# Patient Record
Sex: MALE | Race: WHITE | NOT HISPANIC OR LATINO | Employment: OTHER | URBAN - METROPOLITAN AREA
[De-identification: names, ages, dates, MRNs, and addresses within clinical notes are randomized per-mention and may not be internally consistent; named-entity substitution may affect disease eponyms.]

---

## 2017-01-01 ENCOUNTER — HOSPITAL ENCOUNTER (EMERGENCY)
Facility: CLINIC | Age: 52
Discharge: HOME | End: 2017-01-01
Attending: PHYSICIAN ASSISTANT | Admitting: PHYSICIAN ASSISTANT
Payer: COMMERCIAL

## 2017-01-01 VITALS
SYSTOLIC BLOOD PRESSURE: 123 MMHG | DIASTOLIC BLOOD PRESSURE: 85 MMHG | OXYGEN SATURATION: 98 % | TEMPERATURE: 97 F | RESPIRATION RATE: 20 BRPM

## 2017-01-01 DIAGNOSIS — M06.9 RHEUMATOID ARTHRITIS INVOLVING MULTIPLE SITES, UNSPECIFIED RHEUMATOID FACTOR PRESENCE: ICD-10-CM

## 2017-01-01 PROCEDURE — 99285 EMERGENCY DEPT VISIT HI MDM: CPT | Mod: 25

## 2017-01-01 PROCEDURE — 25000132 ZZH RX MED GY IP 250 OP 250 PS 637: Performed by: PHYSICIAN ASSISTANT

## 2017-01-01 PROCEDURE — 96376 TX/PRO/DX INJ SAME DRUG ADON: CPT

## 2017-01-01 PROCEDURE — 25000125 ZZHC RX 250: Performed by: PHYSICIAN ASSISTANT

## 2017-01-01 PROCEDURE — 99283 EMERGENCY DEPT VISIT LOW MDM: CPT | Performed by: PHYSICIAN ASSISTANT

## 2017-01-01 PROCEDURE — 96374 THER/PROPH/DIAG INJ IV PUSH: CPT

## 2017-01-01 RX ORDER — LIDOCAINE 40 MG/G
CREAM TOPICAL
Status: DISCONTINUED | OUTPATIENT
Start: 2017-01-01 | End: 2017-01-01 | Stop reason: HOSPADM

## 2017-01-01 RX ORDER — OXYCODONE AND ACETAMINOPHEN 10; 325 MG/1; MG/1
1 TABLET ORAL ONCE
Status: COMPLETED | OUTPATIENT
Start: 2017-01-01 | End: 2017-01-01

## 2017-01-01 RX ADMIN — HYDROMORPHONE HYDROCHLORIDE 1 MG: 1 INJECTION, SOLUTION INTRAMUSCULAR; INTRAVENOUS; SUBCUTANEOUS at 13:05

## 2017-01-01 RX ADMIN — HYDROMORPHONE HYDROCHLORIDE 1 MG: 1 INJECTION, SOLUTION INTRAMUSCULAR; INTRAVENOUS; SUBCUTANEOUS at 13:45

## 2017-01-01 RX ADMIN — OXYCODONE AND ACETAMINOPHEN 1 TABLET: 10; 325 TABLET ORAL at 14:36

## 2017-01-01 NOTE — ED NOTES
Started having a flair up of his rheumatoid arthritis about 4 days ago, states took a humera shot yesterday, with no relief. Has an appointment with a pain clinic on Tuesday but here today to get something to make it til his appt.

## 2017-01-01 NOTE — ED AVS SNAPSHOT
[facility] Emergency Department    [address] [place] [address]    Phone:  [phone]    Fax:  [phone]                                       Jailene Breen   MRN: [id number]    Department:  [facility] Emergency Department   Date of Visit:  1/1/2017           Patient Information     Date Of Birth          1965        Your diagnoses for this visit were:     Rheumatoid arthritis involving multiple sites, unspecified rheumatoid factor presence (H)        You were seen by Armando Byrnes PA-C.        Discharge Instructions       1.  Please see your Pain Clinic on Tuesday to discuss your options for pain management.    24 Hour Appointment Hotline       To make an appointment at any [facility] clinic, call [phone] ([phone]). If you don't have a family doctor or clinic, we will help you find one. [facility] clinics are conveniently located to serve the needs of you and your family.             Review of your medicines      Our records show that you are taking the medicines listed below. If these are incorrect, please call your family doctor or clinic.        Dose / Directions Last dose taken    busPIRone 15 MG tablet   Commonly known as:  BUSPAR        Refills:  0        clonazePAM 1 MG tablet   Commonly known as:  klonoPIN   Dose:  1 mg   Quantity:  20 tablet        Take 1 tablet (1 mg) by mouth daily as needed for anxiety   Refills:  0        FLUoxetine 40 MG capsule   Commonly known as:  PROzac   Dose:  40 mg   Quantity:  90 capsule        Take 1 capsule (40 mg) by mouth daily   Refills:  1        folic acid 1 MG tablet   Commonly known as:  FOLVITE        Refills:  0        HUMIRA 40 MG/0.8ML prefilled syringe kit   Dose:  40 mg   Generic drug:  adalimumab        Inject 40 mg Subcutaneous   Refills:  0        IBUPROFEN PO   Dose:  800 mg        Take 800 mg by mouth every 6 hours as needed for moderate pain   Refills:  0        methotrexate 2.5 MG tablet CHEMO    Dose:  6 tablet        6 tablets 6- tablets every Sunday   Refills:  0        methylPREDNISolone 4 MG tablet   Commonly known as:  MEDROL DOSEPAK   Quantity:  21 tablet        Follow package instructions   Refills:  0        oxyCODONE-acetaminophen  MG per tablet   Commonly known as:  PERCOCET   Dose:  1 tablet   Quantity:  20 tablet        Take 1 tablet by mouth every 6 hours as needed   Refills:  0                Procedures and tests performed during your visit     Peripheral IV catheter      Orders Needing Specimen Collection     None      Pending Results     No orders found from 12/31/2016 to 1/2/2017.            Thank you for choosing [facility]       Thank you for choosing [facility] for your care. Our goal is always to provide you with excellent care. Hearing back from our patients is one way we can continue to improve our services. Please take a few minutes to complete the written survey that you may receive in the mail after you visit with us. Thank you!        [employer] Information     [employer] gives you secure access to your electronic health record. If you see a primary care provider, you can also send messages to your care team and make appointments. If you have questions, please call your primary care clinic.  If you do not have a primary care provider, please call [phone] and they will assist you.        After Visit Summary       This is your record. Keep this with you and show to your community pharmacist(s) and doctor(s) at your next visit.

## 2017-01-01 NOTE — ED AVS SNAPSHOT
[facility] Emergency Department    [address] [place] [address]    Phone:  [phone]    Fax:  [phone]                                       Jailene Breen   MRN: [id number]    Department:  [facility] Emergency Department   Date of Visit:  1/1/2017           After Visit Summary Signature Page     I have received my discharge instructions, and my questions have been answered. I have discussed any challenges I see with this plan with the nurse or doctor.    ..........................................................................................................................................  Patient/Patient Representative Signature      ..........................................................................................................................................  Patient Representative Print Name and Relationship to Patient    ..................................................               ................................................  Date                                            Time    ..........................................................................................................................................  Reviewed by Signature/Title    ...................................................              ..............................................  Date                                                            Time

## 2017-01-01 NOTE — ED PROVIDER NOTES
"  History     Chief Complaint   Patient presents with     Joint Pain     HPI  Jailene Breen is a 51 year old male who presents for evaluation of a pain exacerbation related to his rheumatoid arthritis. He states that he was diagnosed with rheumatoid arthritis generally of 2016. He has been on methotrexate until 4 days ago when he received his first injection of Humira. They started an alternative medication regimen due to his lack of improvement with methotrexate therapy. Patient also has ongoing low back pain for which she is seeing the [facility] pain clinic for. He is scheduled for a rhizotomy procedure on 1/10/17.  He was told that he cannot have steroids or anti-inflammatory medications for 2 weeks prior to his procedure. He does not want to miss out on this procedure given the severity of his back pain recently. He is on a pain contract through the [facility] pain center and takes Percocet 10/325 milligrams tabs every 6 hours as needed for pain. Recently has been taking 2 tabs. He is scheduled to see the pain clinic in 2 days. He states \"I just want to make it to my appointment \". He is unable to sleep or function given his pain flare at this time. He notes significant joint stiffness in his IP joints, wrist, bilateral shoulders, bilateral hips, bilateral knees. Moving causes a lot more pain. He also has increased back pain.  He denies any loss of bowel/bladder function.    I have reviewed the Medications, Allergies, Past Medical and Surgical History, and Social History in the [facility] system.    Past Medical History   Diagnosis Date     Anxiety      Panic attacks      Back pain      possible drug seeking behavior in the past.      Arthritis      back, knees        Patient Active Problem List   Diagnosis     CARDIOVASCULAR SCREENING; LDL GOAL LESS THAN 160     Anxiety     Overweight (BMI 25.0-29.9)     Back pain     Tear meniscus knee, right, initial encounter     Rheumatoid arthritis involving multiple " "sites, unspecified rheumatoid factor presence (H)     Chronic pain     Right-sided thoracic back pain, unspecified chronicity     JEFF (generalized anxiety disorder)     Panic attack        Past Surgical History   Procedure Laterality Date     Orthopedic surgery       Rt knee X 2     Orthopedic surgery       Lt foot     Arthroscopy knee Right 9/22/2016     Procedure: ARTHROSCOPY KNEE;  Surgeon: Fredo Hughes MD;  Location: [facility] OR     Inject paravertebral facet joint lumbar / sacral first Right 11/25/2016     Procedure: INJECT PARAVERTEBRAL FACET JOINT LUMBAR / SACRAL FIRST;  Surgeon: Doretha Magallanes MD;  Location: [facility] OR        Social History     Social History     Marital Status:      Spouse Name: N/A     Number of Children: N/A     Years of Education: N/A     Occupational History     Not on file.     Social History Main Topics     Smoking status: Never Smoker      Smokeless tobacco: Current User     Types: Chew      Comment: Chew     Alcohol Use: No      Comment: none     Drug Use: No     Sexual Activity:     Partners: Female      Comment: decreased with Prozac     Other Topics Concern     Not on file     Social History Narrative        History reviewed. No pertinent family history.       No current facility-administered medications for this encounter.     Current Outpatient Prescriptions   Medication     clonazePAM (KLONOPIN) 1 MG tablet     oxyCODONE-acetaminophen (PERCOCET)  MG per tablet     busPIRone (BUSPAR) 15 MG tablet     methotrexate 2.5 MG tablet     adalimumab (HUMIRA) 40 MG/0.8ML prefilled syringe kit     folic acid (FOLVITE) 1 MG tablet     methylPREDNISolone (MEDROL DOSEPAK) 4 MG tablet     FLUoxetine (PROZAC) 40 MG capsule     IBUPROFEN PO           Allergies   Allergen Reactions     Hydrocodone Itching     Remeron Soltab Other (See Comments)     \"Blacked out\" for one week            Review of Systems   All other systems reviewed and are negative.      Physical Exam   BP: " (!) 132/110 mmHg  Heart Rate: 95  Temp: 97  F (36.1  C)  Resp: 20  SpO2: 100 %  Physical Exam  Patient appears to be in pain and is in some distress. His speech is pressured initially as he tries to explain his situation. Skin with seborrheic dermatitis changes on the face, but otherwise no acute rashes. Extremities with tenderness to palpation to all the joints. He does have active synovitis in his second and third MCP joints on the bilateral hands. He is very slow with any movement of the joints.  Low back is very tender to palpation straight leg raising test is positive at 30  on both sides. Patellar and Achilles DTRs are 2 over 4 without clonus present sensation is intact to light touch throughout the entire lower extremities bilaterally.      ED Course   Procedures           The patient is an very difficult situation. Ultimately, in my opinion he needs steroid therapy to treat the acute inflammation occurring from a rheumatoid arthritis flare. He is adamant that he cannot have steroids given his upcoming procedure on 1/10/17. He is determined to have this procedure done given the level of pain that he is experiencing in his lumbar spine. He states that he has been waiting for this procedure for quite some time. His home Percocet is not helping his pain at this time. He is under contract at this time. We discussed this in detail. He is fully aware that he cannot accept any written controlled prescriptions from different providers other than the one that he has the contract with. He is hoping to just have some resolution of his pain over the next 8-10 hours to help get some rest. I think that is appropriate. I do not see any red flags at this time. This chart does reflect a history that he provides.  The patient was in another [facility] ED yesterday today with the same story and concern. They did not provide any pain management at that time given his pain contract.  They prescribed a Medrol Dosepak, but the  patient did not pick it up due to his upcoming rhizotomy procedure and strict instructions from his [place] care clinic not to start any steroids or anti-inflammatories.   [place]  referenced and there is no concerning refill patterns. We are going to start an IV and give him 1 mg of Dilaudid. I openly discussed with him that we are just going to treat his pain acutely at this time and then he would be ready for discharge.  We also discussed that this cannot be a recurrent pattern. Ultimately, she'll need to figure out a pain management plan with his pain clinic in 2 days. If analgesic medication is not working for his pain, then he will have to start steroids.  He was very happy with this plan and was in agreement with terms of our discussion.    1:41 PM - I re-evaluated the patient.  He states that his pain went down to 8/10 from a level of 10/10 starting.  He does have significant tolerance given his regular narcotic use....  No sign of sedation at this time.   We will readminister a dose of 1 mg dilaudid and watch him closely.    Upon recheck, he states his pain is down to 7 on a scale of 10. He does feel much more comfortable. He is wondering about an inpatient stay for pain management. We discussed that this wouldn't be appropriate given his situation. He has tolerance to narcotics given his baseline use of oxycodone. Ultimately, he needs to take the Medrol Dosepak that has been previously recommended, and I recommended this well. She would rather not, as he does not want to jeopardize the possibility of having his surgery on generally 10th. I did agree to give him one of his typical Percocet tabs by mouth prior to discharge. He is in agreement with this.           Critical Care time:  none               Labs Ordered and Resulted from Time of ED Arrival Up to the Time of Departure from the ED - No data to display    Assessments & Plan (with Medical Decision Making)  Rheumatoid arthritis involving multiple sites,  unspecified rheumatoid factor presence (H)       51 year old male with a history of chronic pain related to rheumatoid arthritis presents for an acute flare of his symptoms with the polyarthralgias, stiffness, and MCP joint swelling. He also has a history of chronic back pain that he is seeing the pain clinic for. He is scheduled for appears to be a rhizotomy procedure on 1/10/2017. Exam is otherwise normal other than the MCP joint since this that is noted in the dictation above. Chart reviewed in detail and there were no concerning refill patterns. I did agree to treat his acute pain in the ED setting. 2 mg total of Dilaudid IV were given in intervals.  He had no change in his vital signs during the time. Pain decreased down to 7 on a scale of 10. He was given 1 tab of Percocet to take in the ED. No outpatient Rxs were provided, given that he is under contract for his pain management.   We discussed that the short and long-term management for her symptoms should include a burst of steroids. She does not want to do this, is he was told that this would jeopardize his ability to have his upcoming back procedure on 1/10/17. Patient has a scheduled appointment with his pain clinic in 2 days, and will determine more of a long and short-term plan for his pain management. The patient was in agreement with this plan and was suitable for discharge. His wife drove him home.      I have reviewed the nursing notes.    I have reviewed the findings, diagnosis, plan and need for follow up with the patient.    Discharge Medication List as of 1/1/2017  3:12 PM          Final diagnoses:   Rheumatoid arthritis involving multiple sites, unspecified rheumatoid factor presence (H)       Disclaimer: This note consists of symbols derived from keyboarding, dictation and/or voice recognition software. As a result, there may be errors in the script that have gone undetected. Please consider this when interpreting information found in this  chart.      1/1/2017   Armando Byrnes PA-C   [facility] EMERGENCY DEPARTMENT      Armando Byrnes PA-C  01/01/17 1742

## 2017-01-02 ENCOUNTER — HOSPITAL ENCOUNTER (EMERGENCY)
Facility: CLINIC | Age: 52
Discharge: HOME | End: 2017-01-02
Attending: FAMILY MEDICINE | Admitting: FAMILY MEDICINE
Payer: COMMERCIAL

## 2017-01-02 VITALS
RESPIRATION RATE: 20 BRPM | DIASTOLIC BLOOD PRESSURE: 90 MMHG | OXYGEN SATURATION: 100 % | TEMPERATURE: 96.8 F | SYSTOLIC BLOOD PRESSURE: 130 MMHG

## 2017-01-02 DIAGNOSIS — G89.4 CHRONIC PAIN SYNDROME: ICD-10-CM

## 2017-01-02 PROCEDURE — 96374 THER/PROPH/DIAG INJ IV PUSH: CPT

## 2017-01-02 PROCEDURE — 99285 EMERGENCY DEPT VISIT HI MDM: CPT | Mod: 25

## 2017-01-02 PROCEDURE — 25000125 ZZHC RX 250: Performed by: FAMILY MEDICINE

## 2017-01-02 PROCEDURE — 99283 EMERGENCY DEPT VISIT LOW MDM: CPT | Performed by: FAMILY MEDICINE

## 2017-01-02 RX ADMIN — HYDROMORPHONE HYDROCHLORIDE 2 MG: 1 INJECTION, SOLUTION INTRAMUSCULAR; INTRAVENOUS; SUBCUTANEOUS at 11:37

## 2017-01-02 NOTE — ED AVS SNAPSHOT
[facility] Emergency Department    [address] [place] [address]    [place] [address]    Phone:  [phone]    Fax:  [phone]                                       Jailene Breen   MRN: [id number]    Department:  [facility] Emergency Department   Date of Visit:  1/2/2017           Patient Information     Date Of Birth          1965        Your diagnoses for this visit were:     Chronic pain syndrome        You were seen by Gumaro Mtz MD.      Follow-up Information     Follow up with [facility]. Schedule an appointment as soon as possible for a visit in 1 day.        Discharge Instructions       1.  You've been given a pain shot here today and this is all that we can do for your chronic pain.  You need to work with your primary care doctor or pain specialist for any further pain medications.  2.  Unless you're willing to do steroids which your rheumatologist is recommended doing, there's not much more that we can do for you.  If you return for this chronic pain condition, you will not be receiving any further narcotic injections.               [facility]  Emergency Room  [address] [place]   [address]  Tel: [phone]   Fax: [phone]  June 12, 2013         Chronic and Recurrent Pain:  Dear Patient:  You were seen today for an issue regarding chronic or recurrent pain. You may have a condition that gives you pain every day, or a condition that causes pain that keeps coming back, or several conditions involving pain.   Many patients with chronic or recurrent pain come to the Emergency Department thinking that a shot of narcotic pain medicine or a prescription for pain pills to take home is the best answer to their problem. We have discovered, though, that these approaches put our patients at high risk of long-term problems. This sort of treatment may actually make your pain worse, make it harder to control,  and put you at an unacceptable risk of complications.   Because of the risks, we are very hesitant to treat your type of pain with short-acting or potentially habit-forming medications. These medications represent a significant risk to your health, and need to be managed by a physician who can follow your care consistently.  We have established a policy for the treatment of patients with chronic or recurrent pain that does not allow for treatment with injection narcotics or take-home prescriptions for narcotics.  We will treat your symptoms with non-narcotic medications and other treatments, and we will make referrals to pain specialists or other specialized providers if we think such referrals would benefit you.  You should expect to receive treatment consistent with this policy during future visits.    If you have concerns about our policy, please discuss them with your primary care provider or pain specialist, who can contact us if necessary for further information or clarification.  If you were given a prescription for medicine here today, be sure to read all of the information (including the package insert) that comes with your prescription.  This will include important information about the medicine, its side effects, and any warnings that you need to know about.  The pharmacist who fills the prescription can provide more information and answer questions you may have about the medicine.  If you have questions or concerns that the pharmacist cannot address, please call or return to the Emergency Department.   Remember that you can always come back to the Emergency Department if you develop any new symptoms or if there is anything that worries you.    Sincerely,  The Physicians and Staff of the [facility]      24 Hour Appointment Hotline       To make an appointment at any [facility] clinic, call [phone] ([phone]). If you don't have a family doctor or clinic, we will help you find  one. [facility] clinics are conveniently located to serve the needs of you and your family.             Review of your medicines      Our records show that you are taking the medicines listed below. If these are incorrect, please call your family doctor or clinic.        Dose / Directions Last dose taken    busPIRone 15 MG tablet   Commonly known as:  BUSPAR        Refills:  0        clonazePAM 1 MG tablet   Commonly known as:  klonoPIN   Dose:  1 mg   Quantity:  20 tablet        Take 1 tablet (1 mg) by mouth daily as needed for anxiety   Refills:  0        FLUoxetine 40 MG capsule   Commonly known as:  PROzac   Dose:  40 mg   Quantity:  90 capsule        Take 1 capsule (40 mg) by mouth daily   Refills:  1        folic acid 1 MG tablet   Commonly known as:  FOLVITE        Refills:  0        HUMIRA 40 MG/0.8ML prefilled syringe kit   Dose:  40 mg   Generic drug:  adalimumab        Inject 40 mg Subcutaneous   Refills:  0        IBUPROFEN PO   Dose:  800 mg        Take 800 mg by mouth every 6 hours as needed for moderate pain   Refills:  0        methotrexate 2.5 MG tablet CHEMO   Dose:  6 tablet        6 tablets 6- tablets every Sunday   Refills:  0        methylPREDNISolone 4 MG tablet   Commonly known as:  MEDROL DOSEPAK   Quantity:  21 tablet        Follow package instructions   Refills:  0        oxyCODONE-acetaminophen  MG per tablet   Commonly known as:  PERCOCET   Dose:  1 tablet   Quantity:  20 tablet        Take 1 tablet by mouth every 6 hours as needed   Refills:  0                Orders Needing Specimen Collection     None      Pending Results     No orders found from 1/1/2017 to 1/3/2017.            Thank you for choosing [facility]       Thank you for choosing [facility] for your care. Our goal is always to provide you with excellent care. Hearing back from our patients is one way we can continue to improve our services. Please take a few minutes to complete the written survey that you may receive in the  mail after you visit with us. Thank you!        [employer] Information     [employer] gives you secure access to your electronic health record. If you see a primary care provider, you can also send messages to your care team and make appointments. If you have questions, please call your primary care clinic.  If you do not have a primary care provider, please call [phone] and they will assist you.        After Visit Summary       This is your record. Keep this with you and show to your community pharmacist(s) and doctor(s) at your next visit.

## 2017-01-02 NOTE — ED AVS SNAPSHOT
[facility] Emergency Department    [address] [place] [address]    Phone:  [phone]    Fax:  [phone]                                       Jailene Breen   MRN: [id number]    Department:  [facility] Emergency Department   Date of Visit:  1/2/2017           After Visit Summary Signature Page     I have received my discharge instructions, and my questions have been answered. I have discussed any challenges I see with this plan with the nurse or doctor.    ..........................................................................................................................................  Patient/Patient Representative Signature      ..........................................................................................................................................  Patient Representative Print Name and Relationship to Patient    ..................................................               ................................................  Date                                            Time    ..........................................................................................................................................  Reviewed by Signature/Title    ...................................................              ..............................................  Date                                                            Time

## 2017-01-02 NOTE — ED PROVIDER NOTES
History     Chief Complaint   Patient presents with     Arthritis     HPI  Jailene Breen is a 51 year old male who presents for continued generalized arthritic pain and unable to control his pain at home.  This is the patient's 3rd visit for this here in the last 4 days.  He was seen initially at another facility.  They consulted his rheumatologist who recommended starting the patient on prednisone.  He also has a pain contract and so they did not want to discharge him with any pain medications.  He was very against using any steroids because he was told he can't have them for 10 days before a back procedure he has set up for January 10.  He then came in yesterday and was seen.  He was given some IV pain medications which she says helped a lot although he told the provider and the nurse yesterday that his pain just went from a 10 down to a 7.  Patient states the pain started to get worse again this morning.  He still is adamant that he doesn't want to take steroids.  He thinks it doesn't help the pain.  He states normally when he has flareups like this the only last a couple of days but this is going on 4 to 5 days now    I have reviewed the Medications, Allergies, Past Medical and Surgical History, and Social History in the Epic system.    Review of Systems   All other systems reviewed and are negative.      Physical Exam   BP: 130/90 mmHg  Heart Rate: 78  Temp: 96.8  F (36  C)  Resp: 20  SpO2: 100 %  Physical Exam   Constitutional: He appears well-developed and well-nourished. No distress.   Musculoskeletal: Normal range of motion. He exhibits no edema or tenderness.   Skin: Skin is warm and dry. No rash noted. He is not diaphoretic.   Psychiatric: He has a normal mood and affect. His behavior is normal. Judgment and thought content normal.   Nursing note and vitals reviewed.      ED Course   Procedures        The patient returns for continued chronic pain issues.  The patient has a pain contract and cannot  receive a prescription for pain medications.  He is requesting to get IV pain medicines like yesterday and he is mostly seen his pain clinic doctor tomorrow.  I told him even though he didn't get a prescription yesterday which was appropriate he cannot keep coming back to the emergency department to receive IV or intramuscular shots of narcotics.  Call this primary care doctor discussed the case with her.  She states that she actually is no longer taking his pain has referred him to the pain clinic and back specialist who is managing this.  Unfortunately he is not in today.  I told him I would give him an IM shot Dilaudid here now with the express understanding that he cannot receive any more narcotics from the emergency department for this condition.  He needs to work closely with his pain doctor and back surgeon.  I also put this on his discharge instructions and he said he understood this.  There'd be no reason that he could dispute this if he comes back again on the future and should not receive narcotics for this problem.  Patient is safe to be discharged home    Assessments & Plan (with Medical Decision Making)  chronic pain syndrome      I have reviewed the nursing notes.    I have reviewed the findings, diagnosis, plan and need for follow up with the patient.            1/2/2017   [facility] EMERGENCY DEPARTMENT      Gumaro Mtz MD  01/02/17 9294

## 2017-01-02 NOTE — DISCHARGE INSTRUCTIONS
1.  You've been given a pain shot here today and this is all that we can do for your chronic pain.  You need to work with your primary care doctor or pain specialist for any further pain medications.  2.  Unless you're willing to do steroids which your rheumatologist is recommended doing, there's not much more that we can do for you.  If you return for this chronic pain condition, you will not be receiving any further narcotic injections.               [facility]  Emergency Room  [address]   [address]  Tel: [phone]   Fax: [phone]  June 12, 2013         Chronic and Recurrent Pain:  Dear Patient:  You were seen today for an issue regarding chronic or recurrent pain. You may have a condition that gives you pain every day, or a condition that causes pain that keeps coming back, or several conditions involving pain.   Many patients with chronic or recurrent pain come to the Emergency Department thinking that a shot of narcotic pain medicine or a prescription for pain pills to take home is the best answer to their problem. We have discovered, though, that these approaches put our patients at high risk of long-term problems. This sort of treatment may actually make your pain worse, make it harder to control, and put you at an unacceptable risk of complications.   Because of the risks, we are very hesitant to treat your type of pain with short-acting or potentially habit-forming medications. These medications represent a significant risk to your health, and need to be managed by a physician who can follow your care consistently.  We have established a policy for the treatment of patients with chronic or recurrent pain that does not allow for treatment with injection narcotics or take-home prescriptions for narcotics.  We will treat your symptoms with non-narcotic medications and other treatments, and we will make referrals to pain specialists or other  specialized providers if we think such referrals would benefit you.  You should expect to receive treatment consistent with this policy during future visits.    If you have concerns about our policy, please discuss them with your primary care provider or pain specialist, who can contact us if necessary for further information or clarification.  If you were given a prescription for medicine here today, be sure to read all of the information (including the package insert) that comes with your prescription.  This will include important information about the medicine, its side effects, and any warnings that you need to know about.  The pharmacist who fills the prescription can provide more information and answer questions you may have about the medicine.  If you have questions or concerns that the pharmacist cannot address, please call or return to the Emergency Department.   Remember that you can always come back to the Emergency Department if you develop any new symptoms or if there is anything that worries you.    Sincerely,  The Physicians and Staff of [facility]

## 2017-01-02 NOTE — ED NOTES
Was here yesterday, comes back today because the pain has increased today. States his wife helped him to dress because the pain is so bad.

## 2017-01-04 DIAGNOSIS — F41.0 PANIC ATTACK: Primary | ICD-10-CM

## 2017-01-04 NOTE — TELEPHONE ENCOUNTER
Pt has been seeing  Yobany GOMEZ  for anxiety.  This med is not on current med list.  To provider to advise.  Glory Rosen RN

## 2017-01-05 RX ORDER — HYDROXYZINE HYDROCHLORIDE 25 MG/1
25-50 TABLET, FILM COATED ORAL EVERY 6 HOURS PRN
Qty: 90 TABLET | Refills: 3 | Status: SHIPPED | OUTPATIENT
Start: 2017-01-05 | End: 2017-07-28

## 2017-01-12 ENCOUNTER — HOSPITAL ENCOUNTER (EMERGENCY)
Facility: CLINIC | Age: 52
Discharge: HOME | End: 2017-01-12
Attending: FAMILY MEDICINE | Admitting: FAMILY MEDICINE
Payer: COMMERCIAL

## 2017-01-12 ENCOUNTER — OFFICE VISIT (OUTPATIENT)
Dept: URGENT CARE | Facility: URGENT CARE | Age: 52
End: 2017-01-12
Payer: COMMERCIAL

## 2017-01-12 ENCOUNTER — MYC MEDICAL ADVICE (OUTPATIENT)
Dept: FAMILY MEDICINE | Facility: CLINIC | Age: 52
End: 2017-01-12

## 2017-01-12 VITALS
TEMPERATURE: 97.7 F | HEART RATE: 57 BPM | DIASTOLIC BLOOD PRESSURE: 98 MMHG | RESPIRATION RATE: 12 BRPM | OXYGEN SATURATION: 98 % | WEIGHT: 205 LBS | HEIGHT: 76 IN | SYSTOLIC BLOOD PRESSURE: 129 MMHG | BODY MASS INDEX: 24.96 KG/M2

## 2017-01-12 VITALS
BODY MASS INDEX: 24.96 KG/M2 | SYSTOLIC BLOOD PRESSURE: 143 MMHG | OXYGEN SATURATION: 100 % | HEART RATE: 107 BPM | WEIGHT: 205 LBS | TEMPERATURE: 97.9 F | DIASTOLIC BLOOD PRESSURE: 89 MMHG

## 2017-01-12 DIAGNOSIS — R52 GENERALIZED PAIN: Primary | ICD-10-CM

## 2017-01-12 DIAGNOSIS — G89.29 OTHER CHRONIC PAIN: ICD-10-CM

## 2017-01-12 DIAGNOSIS — Z76.5 DRUG-SEEKING BEHAVIOR: ICD-10-CM

## 2017-01-12 PROCEDURE — 99213 OFFICE O/P EST LOW 20 MIN: CPT | Performed by: NURSE PRACTITIONER

## 2017-01-12 PROCEDURE — 99282 EMERGENCY DEPT VISIT SF MDM: CPT

## 2017-01-12 ASSESSMENT — PAIN SCALES - GENERAL: PAINLEVEL: WORST PAIN (10)

## 2017-01-12 ASSESSMENT — ENCOUNTER SYMPTOMS: ARTHRALGIAS: 1

## 2017-01-12 NOTE — PROGRESS NOTES
"  SUBJECTIVE:                                                    Jailene Breen is a 51 year old male who presents to clinic today for the following health issues:    Musculoskeletal problem/pain      Duration: this morning    Description  Location: wrists, elbows, knees, hands    Intensity:  moderate    Accompanying signs and symptoms: swelling, aching, pain    History  Previous similar problem: YES  Previous evaluation:  Pt has been seen for this before.     Precipitating or alleviating factors:  Trauma or overuse: no   Aggravating factors include: nothing in particular. Pt states that use of his body causes pain.     Therapies tried and outcome: oxycodone, humera         Allergies   Allergen Reactions     Hydrocodone Itching     Remeron Soltab Other (See Comments)     \"Blacked out\" for one week       Past Medical History   Diagnosis Date     Anxiety      Panic attacks      Back pain      possible drug seeking behavior in the past.      Arthritis      back, knees         Current Outpatient Prescriptions on File Prior to Visit:  hydrOXYzine (ATARAX) 25 MG tablet Take 1-2 tablets (25-50 mg) by mouth every 6 hours as needed for anxiety   methylPREDNISolone (MEDROL DOSEPAK) 4 MG tablet Follow package instructions   clonazePAM (KLONOPIN) 1 MG tablet Take 1 tablet (1 mg) by mouth daily as needed for anxiety   FLUoxetine (PROZAC) 40 MG capsule Take 1 capsule (40 mg) by mouth daily   oxyCODONE-acetaminophen (PERCOCET)  MG per tablet Take 1 tablet by mouth every 6 hours as needed   busPIRone (BUSPAR) 15 MG tablet    methotrexate 2.5 MG tablet 6 tablets 6- tablets every Sunday   adalimumab (HUMIRA) 40 MG/0.8ML prefilled syringe kit Inject 40 mg Subcutaneous   folic acid (FOLVITE) 1 MG tablet    IBUPROFEN PO Take 800 mg by mouth every 6 hours as needed for moderate pain     No current facility-administered medications on file prior to visit.    Social History   Substance Use Topics     Smoking status: Never Smoker  "     Smokeless tobacco: Current User     Types: Chew      Comment: Chew     Alcohol Use: No      Comment: none       ROS:  GEN no fevers  SKIN as above  Musculoskel: + as above    OBJECTIVE:  /89 mmHg  Pulse 107  Temp(Src) 97.9  F (36.6  C) (Oral)  Wt 205 lb (92.987 kg)  SpO2 100%   General:   awake, alert, and cooperative.  NAD.   Head: Normocephalic, atraumatic.  Eyes: Conjunctiva clear,   MS:  No noticeable swelling on the joints, has full ROM. Pulses and sensation intact.   Neuro: Alert and oriented - normal speech.    ASSESSMENT:      ICD-10-CM    1. Generalized pain R52        PLAN:   Patient had 90 tablets of oxycodone 15 mg on 01/03/2017, he is demanding for more pain medication. I explained that I cannot prescribe any opioids for RA. I OFFERED to prescribed prednisone but he declined.    Nay Simmons  FNP-BC  Family Nurse Practitoner

## 2017-01-12 NOTE — ED AVS SNAPSHOT
[facility] Emergency Department    [address] [place] [address]    Phone:  [phone]    Fax:  [phone]                                       Jailene Breen   MRN: [id number]    Department:  [facility] Emergency Department   Date of Visit:  1/12/2017           Patient Information     Date Of Birth          1965        Your diagnoses for this visit were:     Other chronic pain     Drug-seeking behavior        You were seen by Asif De Santiago MD.      24 Hour Appointment Hotline       To make an appointment at any [facility] clinic, call [phone] ([phone]). If you don't have a family doctor or clinic, we will help you find one. [facility] clinics are conveniently located to serve the needs of you and your family.             Review of your medicines      Our records show that you are taking the medicines listed below. If these are incorrect, please call your family doctor or clinic.        Dose / Directions Last dose taken    busPIRone 15 MG tablet   Commonly known as:  BUSPAR        Refills:  0        FLUoxetine 40 MG capsule   Commonly known as:  PROzac   Dose:  40 mg   Quantity:  90 capsule        Take 1 capsule (40 mg) by mouth daily   Refills:  1        folic acid 1 MG tablet   Commonly known as:  FOLVITE        Refills:  0        HUMIRA 40 MG/0.8ML prefilled syringe kit   Dose:  40 mg   Generic drug:  adalimumab        Inject 40 mg Subcutaneous   Refills:  0        hydrOXYzine 25 MG tablet   Commonly known as:  ATARAX   Dose:  25-50 mg   Quantity:  90 tablet        Take 1-2 tablets (25-50 mg) by mouth every 6 hours as needed for anxiety   Refills:  3        IBUPROFEN PO   Dose:  800 mg        Take 800 mg by mouth every 6 hours as needed for moderate pain   Refills:  0        methotrexate 2.5 MG tablet CHEMO   Dose:  6 tablet        6 tablets 6- tablets every Sunday   Refills:  0        methylPREDNISolone 4 MG tablet   Commonly known as:  MEDROL DOSEPAK    Quantity:  21 tablet        Follow package instructions   Refills:  0        oxyCODONE-acetaminophen  MG per tablet   Commonly known as:  PERCOCET   Dose:  1 tablet   Quantity:  20 tablet        Take 1 tablet by mouth every 6 hours as needed   Refills:  0                Orders Needing Specimen Collection     None      Pending Results     No orders found from 1/11/2017 to 1/13/2017.            Pending Culture Results     No orders found from 1/11/2017 to 1/13/2017.            Thank you for choosing [facility]       Thank you for choosing [facility] for your care. Our goal is always to provide you with excellent care. Hearing back from our patients is one way we can continue to improve our services. Please take a few minutes to complete the written survey that you may receive in the mail after you visit with us. Thank you!        [employer] Information     [employer] gives you secure access to your electronic health record. If you see a primary care provider, you can also send messages to your care team and make appointments. If you have questions, please call your primary care clinic.  If you do not have a primary care provider, please call [phone] and they will assist you.        Care EveryWhere ID     This is your Care EveryWhere ID. This could be used by other organizations to access your [facility] medical records  [id number]        After Visit Summary       This is your record. Keep this with you and show to your community pharmacist(s) and doctor(s) at your next visit.

## 2017-01-12 NOTE — NURSING NOTE
"Chief Complaint   Patient presents with     Arthritis     Pt c/o RA flare up starting this morning.       Initial /89 mmHg  Pulse 107  Temp(Src) 97.9  F (36.6  C) (Oral)  Wt 205 lb (92.987 kg)  SpO2 100% Estimated body mass index is 24.96 kg/(m^2) as calculated from the following:    Height as of 12/30/16: 6' 4\" (1.93 m).    Weight as of this encounter: 205 lb (92.987 kg).  BP completed using cuff size: dawn De Santiago CMA (AAMA)        "

## 2017-01-12 NOTE — ED NOTES
Patient is weepy and states his current pain meds aren't keeping his pain under control. He is c/o headache related to the joint pain being so severe.

## 2017-01-12 NOTE — ED NOTES
Pt here with generalized pain, Pt has history of arthritis and is on a pain contract but pain is not controlled.

## 2017-01-12 NOTE — ED PROVIDER NOTES
"  History     Chief Complaint   Patient presents with     Joint Pain     The history is provided by the patient.     Jailene Breen is a 51 year old male with a history of rheumatoid arthritis who presents to the emergency department with joint pain. Patient states that \"every joint in my body hurts\". He says his back, right knee, and hands hurt the most. He is a  and says today he was unable to hold his brush. He reports that during these flare ups it seems as if any pain he has ever had in the past intensifies. Patient sees rheumatology through [facility] and is prescribed Humira to be taken every couple of weeks. He states he administers his next shot tomorrow. He says he does not like his rheumatologist because \"all she does when I call her is tell me to take Aleve\". Patient notes a herniated disc and is waiting to have nerve ablation done but says he keeps getting sick so this is getting postponed. When asked if he has tried steroids for his joint pain during these flare ups he says he cannot have it because it causes panic attacks and does not alleviate his pain. He also says \"they\" want him off of steroids for 30 days before having the nerve ablation done. Patient was seen in the urgent care and [facility] ED today and is upset because \"no one does anything\". He says, \"I'm not asking for whatever I got and I can't help hurting this bad. It's not fair\". Patient states he called his pain clinic today, as well, and has an appointment with them on Tuesday, 1/17/17. He is asking me to give him IV narcotics.     Here is the summary from Dr Mtz from his last ED visit here at [facility]:  The patient returns for continued chronic pain issues.  The patient has a pain contract and cannot receive a prescription for pain medications.  He is requesting to get IV pain medicines like yesterday and he is mostly seen his pain clinic doctor tomorrow.  I told him even though he didn't get a prescription yesterday which was " appropriate he cannot keep coming back to the emergency department to receive IV or intramuscular shots of narcotics.  Call this primary care doctor discussed the case with her.  She states that she actually is no longer taking his pain has referred him to the pain clinic and back specialist who is managing this.  Unfortunately he is not in today.  I told him I would give him an IM shot Dilaudid here now with the express understanding that he cannot receive any more narcotics from the emergency department for this condition.  He needs to work closely with his pain doctor and back surgeon.  I also put this on his discharge instructions and he said he understood this.  There'd be no reason that he could dispute this if he comes back again on the future and should not receive narcotics for this problem.  Patient is safe to be discharged home    Nurse Note:  Pt here with generalized pain, Pt has history of arthritis and is on a pain contract but pain is not controlled.      I have reviewed the Medications, Allergies, Past Medical and Surgical History, and Social History in the Epic system.    Patient Active Problem List   Diagnosis     CARDIOVASCULAR SCREENING; LDL GOAL LESS THAN 160     Anxiety     Overweight (BMI 25.0-29.9)     Back pain     Tear meniscus knee, right, initial encounter     Rheumatoid arthritis involving multiple sites, unspecified rheumatoid factor presence (H)     Chronic pain     Right-sided thoracic back pain, unspecified chronicity     JEFF (generalized anxiety disorder)     Panic attack     Past Medical History   Diagnosis Date     Anxiety      Panic attacks      Back pain      possible drug seeking behavior in the past.      Arthritis      back, knees       Past Surgical History   Procedure Laterality Date     Orthopedic surgery       Rt knee X 2     Orthopedic surgery       Lt foot     Arthroscopy knee Right 9/22/2016     Procedure: ARTHROSCOPY KNEE;  Surgeon: Fredo Hughes MD;   "Location: [facility] OR     Inject paravertebral facet joint lumbar / sacral first Right 11/25/2016     Procedure: INJECT PARAVERTEBRAL FACET JOINT LUMBAR / SACRAL FIRST;  Surgeon: Doretha Magallanes MD;  Location: [facility] OR       No family history on file.    Social History   Substance Use Topics     Smoking status: Never Smoker      Smokeless tobacco: Current User     Types: Chew      Comment: Chew     Alcohol Use: No      Comment: none        Immunization History   Administered Date(s) Administered     TD (ADULT, 7+) 01/30/2006, 10/15/2013          Allergies   Allergen Reactions     Hydrocodone Itching     Remeron Soltab Other (See Comments)     \"Blacked out\" for one week       Current Outpatient Prescriptions   Medication Sig Dispense Refill     hydrOXYzine (ATARAX) 25 MG tablet Take 1-2 tablets (25-50 mg) by mouth every 6 hours as needed for anxiety 90 tablet 3     methylPREDNISolone (MEDROL DOSEPAK) 4 MG tablet Follow package instructions 21 tablet 0     FLUoxetine (PROZAC) 40 MG capsule Take 1 capsule (40 mg) by mouth daily 90 capsule 1     oxyCODONE-acetaminophen (PERCOCET)  MG per tablet Take 1 tablet by mouth every 6 hours as needed 20 tablet 0     busPIRone (BUSPAR) 15 MG tablet        methotrexate 2.5 MG tablet 6 tablets 6- tablets every Sunday       adalimumab (HUMIRA) 40 MG/0.8ML prefilled syringe kit Inject 40 mg Subcutaneous       folic acid (FOLVITE) 1 MG tablet        IBUPROFEN PO Take 800 mg by mouth every 6 hours as needed for moderate pain       Review of Systems   Musculoskeletal: Positive for arthralgias.   All other systems reviewed and are negative.      Physical Exam   BP: (!) 129/98 mmHg  Pulse: 57  Temp: 97.7  F (36.5  C)  Resp: 12  Height: 193 cm (6' 4\")  Weight: 92.987 kg (205 lb)  SpO2: 98 %    Physical Exam   Constitutional: He is oriented to person, place, and time. He appears well-developed and well-nourished.   HENT:   Head: Normocephalic and atraumatic.   Right Ear: External ear normal. "   Left Ear: External ear normal.   Eyes: Conjunctivae and EOM are normal.   Neck: Normal range of motion.   Pulmonary/Chest: No respiratory distress.   Musculoskeletal: Normal range of motion.   Neurological: He is alert and oriented to person, place, and time.   Psychiatric:   Very agitated   Nursing note and vitals reviewed.      ED Course  During the course of our conversation the patient became increasingly agitated that he was not going to get narcotics. We discussed several different options, including steroids, every kind of NSAID, ARANGO-2 inhibitors and he was adamant that none of these were going to work.  He was growing more and more angry. We were in room 16, which is small.  He swung his feet over the edge of the bed and sat on the bed facing me.  In reviewing his chart in the room I found that he has already been to the Urgent Care in [facility] Emergency Department, had called several different clinic and then came to our ED.  Each of them refused to give him narcotics. At one point he put his head down into his hands with his eyes closed and I motioned for the scribe (Yanique Kingston) to quietly leave the room and leave the door open. I edged towards the door as well.  I got up and told him again that I had nothing to offer and he got up and walked out of the ED.     Procedures      Assessments & Plan (with Medical Decision Making)  Jailene is here in the ED looking for more narcotic medicine. He states that he has narcotics at home but is here for an injection of narcotics for his chronic pain. I am not willing to give him narcotics. The visit got very tense at times. The patient left the ED.     I have reviewed the nursing notes.    I have reviewed the findings, diagnosis, plan and need for follow up with the patient.    Discharge Medication List as of 1/12/2017  4:16 PM          Final diagnoses:   Other chronic pain   Drug-seeking behavior       This document serves as a record of services  personally performed by Asif De Santiago MD. It was created on their behalf by Yanique Kingston, a trained medical scribe. The creation of this record is based on the provider's personal observations and the statements of the patient. This document has been checked and approved by the attending provider.     Note: Chart documentation done in part with Dragon Voice Recognition software. Although reviewed after completion, some word and grammatical errors may remain.    1/12/2017   [facility] EMERGENCY DEPARTMENT      Asif De Santiago MD  01/12/17 0285

## 2017-01-12 NOTE — ED AVS SNAPSHOT
[facility] Emergency Department    [address] [place] [address]    Phone:  [phone]    Fax:  [phone]                                       Jailene Breen   MRN: [id number]    Department:  [facility] Emergency Department   Date of Visit:  1/12/2017           After Visit Summary Signature Page     I have received my discharge instructions, and my questions have been answered. I have discussed any challenges I see with this plan with the nurse or doctor.    ..........................................................................................................................................  Patient/Patient Representative Signature      ..........................................................................................................................................  Patient Representative Print Name and Relationship to Patient    ..................................................               ................................................  Date                                            Time    ..........................................................................................................................................  Reviewed by Signature/Title    ...................................................              ..............................................  Date                                                            Time

## 2017-01-13 ENCOUNTER — OFFICE VISIT (OUTPATIENT)
Dept: FAMILY MEDICINE | Facility: CLINIC | Age: 52
End: 2017-01-13
Payer: COMMERCIAL

## 2017-01-13 VITALS
DIASTOLIC BLOOD PRESSURE: 80 MMHG | BODY MASS INDEX: 25.57 KG/M2 | HEIGHT: 76 IN | WEIGHT: 210 LBS | SYSTOLIC BLOOD PRESSURE: 138 MMHG | HEART RATE: 84 BPM | OXYGEN SATURATION: 99 % | TEMPERATURE: 97.8 F

## 2017-01-13 DIAGNOSIS — M06.9 RHEUMATOID ARTHRITIS INVOLVING MULTIPLE SITES, UNSPECIFIED RHEUMATOID FACTOR PRESENCE: ICD-10-CM

## 2017-01-13 DIAGNOSIS — F41.9 ANXIETY: Primary | ICD-10-CM

## 2017-01-13 PROCEDURE — 99213 OFFICE O/P EST LOW 20 MIN: CPT | Performed by: PHYSICIAN ASSISTANT

## 2017-01-13 RX ORDER — ALPRAZOLAM 0.5 MG
0.5 TABLET ORAL 3 TIMES DAILY PRN
Qty: 20 TABLET | Refills: 0 | Status: SHIPPED | OUTPATIENT
Start: 2017-01-13 | End: 2017-02-02

## 2017-01-13 NOTE — MR AVS SNAPSHOT
After Visit Summary   1/13/2017    Jailene Breen    MRN: [id number]           Patient Information     Date Of Birth          1965        Visit Information        Provider Department      1/13/2017 2:00 PM Aiden Resendez PA [facility]        Today's Diagnoses     Anxiety    -  1     Rheumatoid arthritis involving multiple sites, unspecified rheumatoid factor presence (H)           Care Instructions      Anxiety Reaction  Anxiety is the feeling we all get when we think something bad might happen. It is a normal response to stress and usually causes only a mild reaction. When anxiety becomes more severe, it can interfere with daily life. In some cases, you may not even be aware of what it is you re anxious about. There may also be a genetic link or it may be a learned behavior in the home.  Both psychological and physical triggers cause stress reaction. It's often a response to fear or emotional stress, real or imagined. This stress may come from home, family, work, or social relationships.  During an anxiety reaction, you may feel:    Helpless    Nervous    Depressed    Irritable  Your body may show signs of anxiety in many ways. You may experience:    Dry mouth    Shakiness    Dizziness    Weakness    Trouble breathing    Breathing fast (hyperventilating)    Chest pressure    Sweating    Headache    Nausea    Diarrhea    Tiredness    Inability to sleep    Sexual problems  Home care    Try to locate the sources of stress in your life. They may not be obvious. These may include:    Daily hassles of life (traffic jams, missed appointments, car troubles, etc.)    Major life changes, both good (new baby, job promotion) and bad (loss of job, loss of loved one)    Overload: feeling that you have too many responsibilities and can't take care of all of them at once    Feeling helpless, feeling that your problems are beyond what you re able to solve    Notice how your body  reacts to stress. Learn to listen to your body signals. This will help you take action before the stress becomes severe.    When you can, do something about the source of your stress. (Avoid hassles, limit the amount of change that happens in your life at one time and take a break when you feel overloaded).    Unfortunately, many stressful situations can't be avoided. It is necessary to learn how to better manage stress. There are many proven methods that will reduce your anxiety. These include simple things like exercise, good nutrition and adequate rest. Also, there are certain techniques that are helpful:    Relaxation    Breathing exercises    Visualization    Biofeedback    Meditation  For more information about this, consult your doctor or go to a local bookstore and review the many books and tapes available on this subject.  Follow-up care  If you feel that your anxiety is not responding to self-help measures, contact your doctor or make an appointment with a counselor. You may need short-term psychological counseling and temporary medicine to help you manage stress.  Call 911  Call your healthcare provider right away if any of these occur:    Trouble breathing    Confusion    Drowsiness or trouble wakening    Fainting or loss of consciousness    Rapid heart rate    Seizure    New chest pain that becomes more severe, lasts longer, or spreads into your shoulder, arm, neck, jaw, or back  When to seek medical advice  Call your healthcare provider right away if any of these occur:    Your symptoms get worse    Severe headache not relieved by rest and mild pain reliever    7115-7468 [employer]. [address] [place] [address], [place] [address]. All rights reserved. This information is not intended as a substitute for professional medical care. Always follow your healthcare professional's instructions.              Follow-ups after your visit        Follow-up notes from your care team     Return if symptoms  "worsen or fail to improve.      Who to contact     If you have questions or need follow up information about today's clinic visit or your schedule please contact [facility] directly at [phone].  Normal or non-critical lab and imaging results will be communicated to you by MyChart, letter or phone within 4 business days after the clinic has received the results. If you do not hear from us within 7 days, please contact the clinic through [employer] or phone. If you have a critical or abnormal lab result, we will notify you by phone as soon as possible.  Submit refill requests through [email or web address] or call your pharmacy and they will forward the refill request to us. Please allow 3 business days for your refill to be completed.          Additional Information About Your Visit        [employer] Information     [email or web address] gives you secure access to your electronic health record. If you see a primary care provider, you can also send messages to your care team and make appointments. If you have questions, please call your primary care clinic.  If you do not have a primary care provider, please call [phone] and they will assist you.        Care EveryWhere ID     This is your Care EveryWhere ID. This could be used by other organizations to access your [facility] medical records  [id number]        Your Vitals Were     Pulse Temperature Height BMI (Body Mass Index) Pulse Oximetry       84 97.8  F (36.6  C) (Oral) 6' 4\" (1.93 m) 25.57 kg/m2 99%        Blood Pressure from Last 3 Encounters:   01/13/17 138/80   01/13/17 138/80   01/12/17 129/98    Weight from Last 3 Encounters:   01/13/17 210 lb (95.255 kg)   01/13/17 210 lb (95.255 kg)   01/12/17 205 lb (92.987 kg)              Today, you had the following     No orders found for display         Today's Medication Changes          These changes are accurate as of: 1/13/17  2:15 PM.  If you have any questions, ask your nurse or doctor.               Start taking " these medicines.        Dose/Directions    ALPRAZolam 0.5 MG tablet   Commonly known as:  XANAX   Used for:  Anxiety   Started by:  Aiden Resendez PA        Dose:  0.5 mg   Take 1 tablet (0.5 mg) by mouth 3 times daily as needed for anxiety   Quantity:  20 tablet   Refills:  0         These medicines have changed or have updated prescriptions.        Dose/Directions    * FLUoxetine 40 MG capsule   Commonly known as:  PROzac   This may have changed:  Another medication with the same name was added. Make sure you understand how and when to take each.   Used for:  Panic attack, Adjustment disorder with anxious mood   Changed by:  Nanette Haywood PA-C        Dose:  40 mg   Take 1 capsule (40 mg) by mouth daily   Quantity:  90 capsule   Refills:  1       * FLUoxetine 20 MG capsule   Commonly known as:  PROzac   This may have changed:  You were already taking a medication with the same name, and this prescription was added. Make sure you understand how and when to take each.   Used for:  Anxiety   Changed by:  Aiden Resendez PA        Dose:  60 mg   Take 3 capsules (60 mg) by mouth daily   Quantity:  90 capsule   Refills:  3       * Notice:  This list has 2 medication(s) that are the same as other medications prescribed for you. Read the directions carefully, and ask your doctor or other care provider to review them with you.         Where to get your medicines      These medications were sent to [id number] - [place] - [address] [facility]  [address] [facility] [address]     Phone:  [phone]    - FLUoxetine 20 MG capsule      Some of these will need a paper prescription and others can be bought over the counter.  Ask your nurse if you have questions.     Bring a paper prescription for each of these medications    - ALPRAZolam 0.5 MG tablet             Primary Care Provider    [facility]       No address on file        Thank you!     Thank  you for choosing [facility]  for your care. Our goal is always to provide you with excellent care. Hearing back from our patients is one way we can continue to improve our services. Please take a few minutes to complete the written survey that you may receive in the mail after your visit with us. Thank you!             Your Updated Medication List - Protect others around you: Learn how to safely use, store and throw away your medicines at www.disposemymeds.org.          This list is accurate as of: 1/13/17  2:15 PM.  Always use your most recent med list.                   Brand Name Dispense Instructions for use    ALPRAZolam 0.5 MG tablet    XANAX    20 tablet    Take 1 tablet (0.5 mg) by mouth 3 times daily as needed for anxiety       busPIRone 15 MG tablet    BUSPAR         * FLUoxetine 40 MG capsule    PROzac    90 capsule    Take 1 capsule (40 mg) by mouth daily       * FLUoxetine 20 MG capsule    PROzac    90 capsule    Take 3 capsules (60 mg) by mouth daily       folic acid 1 MG tablet    FOLVITE         HUMIRA 40 MG/0.8ML prefilled syringe kit   Generic drug:  adalimumab      Inject 40 mg Subcutaneous       hydrOXYzine 25 MG tablet    ATARAX    90 tablet    Take 1-2 tablets (25-50 mg) by mouth every 6 hours as needed for anxiety       IBUPROFEN PO      Take 800 mg by mouth every 6 hours as needed for moderate pain       methotrexate 2.5 MG tablet CHEMO      6 tablets 6- tablets every Sunday       methylPREDNISolone 4 MG tablet    MEDROL DOSEPAK    21 tablet    Follow package instructions       oxyCODONE-acetaminophen  MG per tablet    PERCOCET    20 tablet    Take 1 tablet by mouth every 6 hours as needed       * Notice:  This list has 2 medication(s) that are the same as other medications prescribed for you. Read the directions carefully, and ask your doctor or other care provider to review them with you.

## 2017-01-13 NOTE — PATIENT INSTRUCTIONS
Anxiety Reaction  Anxiety is the feeling we all get when we think something bad might happen. It is a normal response to stress and usually causes only a mild reaction. When anxiety becomes more severe, it can interfere with daily life. In some cases, you may not even be aware of what it is you re anxious about. There may also be a genetic link or it may be a learned behavior in the home.  Both psychological and physical triggers cause stress reaction. It's often a response to fear or emotional stress, real or imagined. This stress may come from home, family, work, or social relationships.  During an anxiety reaction, you may feel:    Helpless    Nervous    Depressed    Irritable  Your body may show signs of anxiety in many ways. You may experience:    Dry mouth    Shakiness    Dizziness    Weakness    Trouble breathing    Breathing fast (hyperventilating)    Chest pressure    Sweating    Headache    Nausea    Diarrhea    Tiredness    Inability to sleep    Sexual problems  Home care    Try to locate the sources of stress in your life. They may not be obvious. These may include:    Daily hassles of life (traffic jams, missed appointments, car troubles, etc.)    Major life changes, both good (new baby, job promotion) and bad (loss of job, loss of loved one)    Overload: feeling that you have too many responsibilities and can't take care of all of them at once    Feeling helpless, feeling that your problems are beyond what you re able to solve    Notice how your body reacts to stress. Learn to listen to your body signals. This will help you take action before the stress becomes severe.    When you can, do something about the source of your stress. (Avoid hassles, limit the amount of change that happens in your life at one time and take a break when you feel overloaded).    Unfortunately, many stressful situations can't be avoided. It is necessary to learn how to better manage stress. There are many proven methods  that will reduce your anxiety. These include simple things like exercise, good nutrition and adequate rest. Also, there are certain techniques that are helpful:    Relaxation    Breathing exercises    Visualization    Biofeedback    Meditation  For more information about this, consult your doctor or go to a local bookstore and review the many books and tapes available on this subject.  Follow-up care  If you feel that your anxiety is not responding to self-help measures, contact your doctor or make an appointment with a counselor. You may need short-term psychological counseling and temporary medicine to help you manage stress.  Call 911  Call your healthcare provider right away if any of these occur:    Trouble breathing    Confusion    Drowsiness or trouble wakening    Fainting or loss of consciousness    Rapid heart rate    Seizure    New chest pain that becomes more severe, lasts longer, or spreads into your shoulder, arm, neck, jaw, or back  When to seek medical advice  Call your healthcare provider right away if any of these occur:    Your symptoms get worse    Severe headache not relieved by rest and mild pain reliever    9970-4715 [employer]. [address] [place] [address], [place] [address]. All rights reserved. This information is not intended as a substitute for professional medical care. Always follow your healthcare professional's instructions.

## 2017-01-13 NOTE — PROGRESS NOTES
"Panic Attack        Duration: This morning    Description (location/character/radiation): panic attack- pt states that he feels like he is still in the process of anxiety. Pt states that he is very shaky and can't relax.      Intensity:  moderate    Accompanying signs and symptoms: Pt was seen at ER and given steroid yesterday (medrol 6 tabs yesterday and 5 today) and pt states that it increased his anxiety.      History (similar episodes/previous evaluation): hx of panic and anxiety. Has been getting clonopin the past several months, last rx 12/29 for 20.    Precipitating or alleviating factors: None    Therapies tried and outcome: atarax- no relief.      Is following with psychology, ortho, rheum pain management and has some p/t upcoming  sinc estarting prozac, anxiety attack freq has decreased            Allergies   Allergen Reactions     Hydrocodone Itching     Remeron Soltab Other (See Comments)     \"Blacked out\" for one week       Past Medical History   Diagnosis Date     Anxiety      Panic attacks      Back pain      possible drug seeking behavior in the past.      Arthritis      back, knees         Current Outpatient Prescriptions on File Prior to Visit:  hydrOXYzine (ATARAX) 25 MG tablet Take 1-2 tablets (25-50 mg) by mouth every 6 hours as needed for anxiety   methylPREDNISolone (MEDROL DOSEPAK) 4 MG tablet Follow package instructions   FLUoxetine (PROZAC) 40 MG capsule Take 1 capsule (40 mg) by mouth daily   oxyCODONE-acetaminophen (PERCOCET)  MG per tablet Take 1 tablet by mouth every 6 hours as needed   busPIRone (BUSPAR) 15 MG tablet    methotrexate 2.5 MG tablet 6 tablets 6- tablets every Sunday   adalimumab (HUMIRA) 40 MG/0.8ML prefilled syringe kit Inject 40 mg Subcutaneous   folic acid (FOLVITE) 1 MG tablet    IBUPROFEN PO Take 800 mg by mouth every 6 hours as needed for moderate pain     No current facility-administered medications on file prior to visit.    Social History   Substance Use " "Topics     Smoking status: Never Smoker      Smokeless tobacco: Current User     Types: Chew      Comment: Chew     Alcohol Use: No      Comment: none       ROS:   PSYCh as above  RHEUM hx ankylosing spondylosis and RA  MUSC : chronic LBP  OBJECTIVE:  /80 mmHg  Pulse 84  Temp(Src) 97.8  F (36.6  C) (Oral)  Ht 6' 4\" (1.93 m)  Wt 210 lb (95.255 kg)  BMI 25.57 kg/m2  SpO2 99%   General:   awake, alert, and cooperative.  NAD.   Head: Normocephalic, atraumatic.  Eyes: Conjunctiva clear,      Neuro: Alert and oriented - normal speech.    ASSESSMENT:we did discuss switching  to SNRI or TCA though patient would rather increase current med dose as below    ICD-10-CM    1. Anxiety F41.9 FLUoxetine (PROZAC) 20 MG capsule     ALPRAZolam (XANAX) 0.5 MG tablet   2. Rheumatoid arthritis involving multiple sites, unspecified rheumatoid factor presence (H) M06.9        PLAN:   Follow up:  With pain management, psychologist  Advised about symptoms which might herald more serious problems.                "

## 2017-01-13 NOTE — NURSING NOTE
"Chief Complaint   Patient presents with     Anxiety       Initial /80 mmHg  Pulse 84  Temp(Src) 97.8  F (36.6  C) (Oral)  Ht 6' 4\" (1.93 m)  Wt 210 lb (95.255 kg)  BMI 25.57 kg/m2  SpO2 99% Estimated body mass index is 25.57 kg/(m^2) as calculated from the following:    Height as of this encounter: 6' 4\" (1.93 m).    Weight as of this encounter: 210 lb (95.255 kg).  BP completed using cuff size: jeanne Novak CMA      "

## 2017-01-19 NOTE — TELEPHONE ENCOUNTER
This is the message that was sent to the patient over zerved,  The only thing I can prescribe to you is prednisone since you have 90 tabs of oxy prescribed on the 3rd. You are taking way too many narcotic pills and I highly doubt that you need more. I am not comfortable to give you more opiods, as I feel it can be dangerous for you.   Please see your rheumatologist, you should be on Prednisone and a biologic (Humira or similar) since I do not manage RA.     Katarina Haywood PAC

## 2017-02-02 ENCOUNTER — MYC MEDICAL ADVICE (OUTPATIENT)
Dept: FAMILY MEDICINE | Facility: CLINIC | Age: 52
End: 2017-02-02

## 2017-02-02 DIAGNOSIS — F41.0 PANIC ATTACK: Primary | ICD-10-CM

## 2017-02-02 DIAGNOSIS — F41.9 ANXIETY: ICD-10-CM

## 2017-02-02 RX ORDER — ALPRAZOLAM 0.5 MG
0.5 TABLET ORAL
Qty: 20 TABLET | Refills: 0 | Status: SHIPPED | OUTPATIENT
Start: 2017-02-02 | End: 2017-02-09

## 2017-02-02 NOTE — TELEPHONE ENCOUNTER
Called and spoke to pt, he wants to fill at our pharmacy.  Written rx is brought to our pharmacy.  Stanley Solomon,  [facility]

## 2017-02-04 ENCOUNTER — OFFICE VISIT (OUTPATIENT)
Dept: URGENT CARE | Facility: URGENT CARE | Age: 52
End: 2017-02-04
Payer: COMMERCIAL

## 2017-02-04 VITALS
WEIGHT: 213 LBS | TEMPERATURE: 97.8 F | DIASTOLIC BLOOD PRESSURE: 87 MMHG | OXYGEN SATURATION: 99 % | SYSTOLIC BLOOD PRESSURE: 137 MMHG | HEART RATE: 81 BPM | BODY MASS INDEX: 25.94 KG/M2

## 2017-02-04 DIAGNOSIS — M54.50 BILATERAL LOW BACK PAIN WITHOUT SCIATICA, UNSPECIFIED CHRONICITY: ICD-10-CM

## 2017-02-04 DIAGNOSIS — M54.6 MIDLINE THORACIC BACK PAIN, UNSPECIFIED CHRONICITY: Primary | ICD-10-CM

## 2017-02-04 PROCEDURE — 99213 OFFICE O/P EST LOW 20 MIN: CPT | Performed by: FAMILY MEDICINE

## 2017-02-04 RX ORDER — TRAMADOL HYDROCHLORIDE 50 MG/1
50 TABLET ORAL EVERY 6 HOURS PRN
Qty: 20 TABLET | Refills: 0 | Status: SHIPPED | OUTPATIENT
Start: 2017-02-04 | End: 2017-02-09

## 2017-02-04 NOTE — PATIENT INSTRUCTIONS
Tramadol as needed up to 4 pills in 24 hours    Make the alprazolam last until Wednesday    Occasional robaxin    Some stretching/ range of motion as able    Follow up with your pain provider on Wednesday
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
## 2017-02-04 NOTE — NURSING NOTE
"Chief Complaint   Patient presents with     Back Pain     started last week       Initial /87 mmHg  Pulse 81  Temp(Src) 97.8  F (36.6  C) (Oral)  Wt 213 lb (96.616 kg)  SpO2 99% Estimated body mass index is 25.94 kg/(m^2) as calculated from the following:    Height as of 1/13/17: 6' 4\" (1.93 m).    Weight as of this encounter: 213 lb (96.616 kg).  Medication Reconciliation: complete   June Gonzales MA        "

## 2017-02-04 NOTE — MR AVS SNAPSHOT
After Visit Summary   2/4/2017    Jailene Breen    MRN: [id number]           Patient Information     Date Of Birth          1965        Visit Information        Provider Department      2/4/2017 1:45 PM Audi Castellanos MD [facility]        Today's Diagnoses     Midline thoracic back pain, unspecified chronicity    -  1       Care Instructions    Tramadol as needed up to 4 pills in 24 hours    Make the alprazolam last until Wednesday    Occasional robaxin    Some stretching/ range of motion as able    Follow up with your pain provider on Wednesday         Follow-ups after your visit        Who to contact     If you have questions or need follow up information about today's clinic visit or your schedule please contact [facility] directly at [phone].  Normal or non-critical lab and imaging results will be communicated to you by MyChart, letter or phone within 4 business days after the clinic has received the results. If you do not hear from us within 7 days, please contact the clinic through MyChart or phone. If you have a critical or abnormal lab result, we will notify you by phone as soon as possible.  Submit refill requests through [employer] or call your pharmacy and they will forward the refill request to us. Please allow 3 business days for your refill to be completed.          Additional Information About Your Visit        MyChart Information     [employer] gives you secure access to your electronic health record. If you see a primary care provider, you can also send messages to your care team and make appointments. If you have questions, please call your primary care clinic.  If you do not have a primary care provider, please call [phone] and they will assist you.        Care EveryWhere ID     This is your Care EveryWhere ID. This could be used by other organizations to access your [place] medical records  [id number]        Your Vitals Were      Pulse Temperature Pulse Oximetry             81 97.8  F (36.6  C) (Oral) 99%          Blood Pressure from Last 3 Encounters:   02/04/17 137/87   01/13/17 138/80   01/13/17 138/80    Weight from Last 3 Encounters:   02/04/17 213 lb (96.616 kg)   01/13/17 210 lb (95.255 kg)   01/13/17 210 lb (95.255 kg)              Today, you had the following     No orders found for display         Today's Medication Changes          These changes are accurate as of: 2/4/17  2:27 PM.  If you have any questions, ask your nurse or doctor.               Start taking these medicines.        Dose/Directions    traMADol 50 MG tablet   Commonly known as:  ULTRAM   Used for:  Midline thoracic back pain, unspecified chronicity   Started by:  Audi Castellanos MD        Dose:  50 mg   Take 1 tablet (50 mg) by mouth every 6 hours as needed for pain maximum 4 tablet(s) per day   Quantity:  20 tablet   Refills:  0            Where to get your medicines      Some of these will need a paper prescription and others can be bought over the counter.  Ask your nurse if you have questions.     Bring a paper prescription for each of these medications    - traMADol 50 MG tablet             Primary Care Provider    [facility]       No address on file        Thank you!     Thank you for choosing [facility]  for your care. Our goal is always to provide you with excellent care. Hearing back from our patients is one way we can continue to improve our services. Please take a few minutes to complete the written survey that you may receive in the mail after your visit with us. Thank you!             Your Updated Medication List - Protect others around you: Learn how to safely use, store and throw away your medicines at www.disposemymeds.org.          This list is accurate as of: 2/4/17  2:27 PM.  Always use your most recent med list.                   Brand Name Dispense Instructions for use    ALPRAZolam 0.5 MG  tablet    XANAX    20 tablet    Take 1 tablet (0.5 mg) by mouth nightly as needed for anxiety       busPIRone 15 MG tablet    BUSPAR         * FLUoxetine 40 MG capsule    PROzac    90 capsule    Take 1 capsule (40 mg) by mouth daily       * FLUoxetine 20 MG capsule    PROzac    90 capsule    Take 3 capsules (60 mg) by mouth daily       folic acid 1 MG tablet    FOLVITE         HUMIRA 40 MG/0.8ML prefilled syringe kit   Generic drug:  adalimumab      Inject 40 mg Subcutaneous       hydrOXYzine 25 MG tablet    ATARAX    90 tablet    Take 1-2 tablets (25-50 mg) by mouth every 6 hours as needed for anxiety       IBUPROFEN PO      Take 800 mg by mouth every 6 hours as needed for moderate pain       methotrexate 2.5 MG tablet CHEMO      6 tablets 6- tablets every Sunday       methylPREDNISolone 4 MG tablet    MEDROL DOSEPAK    21 tablet    Follow package instructions       oxyCODONE-acetaminophen  MG per tablet    PERCOCET    20 tablet    Take 1 tablet by mouth every 6 hours as needed       traMADol 50 MG tablet    ULTRAM    20 tablet    Take 1 tablet (50 mg) by mouth every 6 hours as needed for pain maximum 4 tablet(s) per day       * Notice:  This list has 2 medication(s) that are the same as other medications prescribed for you. Read the directions carefully, and ask your doctor or other care provider to review them with you.

## 2017-02-07 ENCOUNTER — TRANSFERRED RECORDS (OUTPATIENT)
Dept: HEALTH INFORMATION MANAGEMENT | Facility: CLINIC | Age: 52
End: 2017-02-07

## 2017-02-09 ENCOUNTER — OFFICE VISIT (OUTPATIENT)
Dept: URGENT CARE | Facility: URGENT CARE | Age: 52
End: 2017-02-09
Payer: COMMERCIAL

## 2017-02-09 VITALS
HEART RATE: 93 BPM | WEIGHT: 212.6 LBS | TEMPERATURE: 97.7 F | DIASTOLIC BLOOD PRESSURE: 84 MMHG | SYSTOLIC BLOOD PRESSURE: 138 MMHG | OXYGEN SATURATION: 100 % | BODY MASS INDEX: 25.89 KG/M2

## 2017-02-09 DIAGNOSIS — F41.0 PANIC ATTACK: Primary | ICD-10-CM

## 2017-02-09 PROCEDURE — 99213 OFFICE O/P EST LOW 20 MIN: CPT | Performed by: NURSE PRACTITIONER

## 2017-02-09 RX ORDER — CLONAZEPAM 0.5 MG/1
0.25 TABLET ORAL 2 TIMES DAILY PRN
Qty: 16 TABLET | Refills: 0 | Status: SHIPPED | OUTPATIENT
Start: 2017-02-09 | End: 2017-02-17

## 2017-02-09 RX ORDER — OXYCODONE HYDROCHLORIDE 5 MG/1
5 CAPSULE ORAL EVERY 6 HOURS PRN
COMMUNITY
End: 2017-08-08

## 2017-02-09 NOTE — MR AVS SNAPSHOT
After Visit Summary   2/9/2017    Jailene Breen    MRN: [id number]           Patient Information     Date Of Birth          1965        Visit Information        Provider Department      2/9/2017 1:40 PM [facility] URGENT [facility]        Today's Diagnoses     Panic attack    -  1       Care Instructions      Panic Attack  A panic attack is an extreme fear reaction that comes on for no clear reason. There is often a fear that something terrible will happen or that you may die. The attack may last a few minutes up to a few hours. Between attacks, things will seem quite normal. This condition has a psychological cause and can be treated with the help of a therapist or psychiatrist. Medicine can be very helpful for this problem.  Panic attacks usually come on suddenly, reaches a peak within minutes, and includes at least 4 of these symptoms:    Palpitations, pounding heart, or accelerated heart rate    Sweating    Crying    Trembling or shaking    Sensations of shortness of breath or smothering    Feelings of choking    Chest pain or discomfort    Nausea or abdominal distress    Feeling dizzy, unsteady, light-headed, or faint    Numbness or tingling sensations    Fear of dying    Fear of going crazy or of losing control    Feelings of unreality, strangeness, or detachment from the environment  Many of these symptoms can be linked to physical problems, so it is sometimes necessary to rule out conditions like thyroid disorders, heart disease, gastrointestinal problems, and others. They can also start as physical symptoms, but psychologically we may react to them in a fearful way, worsening the way we react and feel.  Home care    Try to find the sources of stress in your life. They may not be obvious. These may include:    Daily hassles of life which pile up (traffic jams, missed appointments, car troubles, etc.).    Major life changes, both good (new baby, job promotion)  and bad (loss of job, loss of loved one).    Feeling that you have too many responsibilities and can't take care of everything at once.    Helplessness: feeling like your problems are too much for you to handle.    Notice how your body reacts to stress. Learn to listen to your body signals so that you can take action before the stress becomes severe.    Try to be aware of what you were doing before the reaction started; this may give you clues to things that can trigger a reaction. It may be situations in your life, or what you were doing at the time.    When possible, avoid or reduce the cause of stress. Avoid hassles, limit the amount of change that is happening in your life at one time or take a break when you feel overloaded.    Unfortunately, many stressful situations cannot be avoided. Therefore, it is necessary to learn how to manage stress better. There are many proven methods that work and will reduce your anxiety. These include simple things like exercise, good nutrition and adequate rest. Also, there are certain techniques that are helpful: relaxation and breathing exercises, visualization, biofeedback, meditation or simply taking some time-out to clear your mind. For more information about this, ask your doctor or go to a local bookstore and review the many books and tapes available on this subject.  Follow-up care  Follow-up with your healthcare provider, or as advised.  Call 911  Call 911 if any of these occur:    Trouble breathing    Confusion    Very drowsy or trouble awakening    Fainting or loss of consciousness    Rapid heart rate    Seizure    New chest pain that becomes more severe, lasts longer, or spreads into your shoulder, arm, neck, jaw or back  When to seek medical advice  Call your healthcare provider right away if any of these occur:    Worsening of your symptoms to the point of feeling out-of-control    Increased pain with breathing    Increasing feeling of weakness or  dizziness    Cough with dark colored sputum (phlegm) or blood    Fever 1 degree above normal temperature ) lasting 24 to 48 hours or what your healthcare provider has advised    Swelling, pain or redness in one leg    Requests by family or friends for you to seek help for your symptoms    7002-7818 [employer]. [address] [place] [address]. All rights reserved. This information is not intended as a substitute for professional medical care. Always follow your healthcare professional's instructions.              Follow-ups after your visit        Who to contact     If you have questions or need follow up information about today's clinic visit or your schedule please contact [facility] directly at [phone].  Normal or non-critical lab and imaging results will be communicated to you by MyChart, letter or phone within 4 business days after the clinic has received the results. If you do not hear from us within 7 days, please contact the clinic through [employer] or phone. If you have a critical or abnormal lab result, we will notify you by phone as soon as possible.  Submit refill requests through [employer] or call your pharmacy and they will forward the refill request to us. Please allow 3 business days for your refill to be completed.          Additional Information About Your Visit        MyChart Information     [employer] gives you secure access to your electronic health record. If you see a primary care provider, you can also send messages to your care team and make appointments. If you have questions, please call your primary care clinic.  If you do not have a primary care provider, please call [phone] and they will assist you.        Care EveryWhere ID     This is your Care EveryWhere ID. This could be used by other organizations to access your [place] medical records  [id number]        Your Vitals Were     Pulse Temperature Pulse Oximetry             93 97.7  F  (36.5  C) (Oral) 100%          Blood Pressure from Last 3 Encounters:   02/09/17 138/84   02/04/17 137/87   01/13/17 138/80    Weight from Last 3 Encounters:   02/09/17 212 lb 9.6 oz (96.435 kg)   02/04/17 213 lb (96.616 kg)   01/13/17 210 lb (95.255 kg)              Today, you had the following     No orders found for display         Today's Medication Changes          These changes are accurate as of: 2/9/17  3:13 PM.  If you have any questions, ask your nurse or doctor.               Start taking these medicines.        Dose/Directions    clonazePAM 0.5 MG tablet   Commonly known as:  klonoPIN   Used for:  Panic attack        Dose:  0.25 mg   Take 0.5 tablets (0.25 mg) by mouth 2 times daily as needed for anxiety   Quantity:  16 tablet   Refills:  0            Where to get your medicines      Some of these will need a paper prescription and others can be bought over the counter.  Ask your nurse if you have questions.     Bring a paper prescription for each of these medications    - clonazePAM 0.5 MG tablet             Primary Care Provider    [facility]       No address on file        Thank you!     Thank you for choosing [facility]  for your care. Our goal is always to provide you with excellent care. Hearing back from our patients is one way we can continue to improve our services. Please take a few minutes to complete the written survey that you may receive in the mail after your visit with us. Thank you!             Your Updated Medication List - Protect others around you: Learn how to safely use, store and throw away your medicines at www.disposemymeds.org.          This list is accurate as of: 2/9/17  3:13 PM.  Always use your most recent med list.                   Brand Name Dispense Instructions for use    clonazePAM 0.5 MG tablet    klonoPIN    16 tablet    Take 0.5 tablets (0.25 mg) by mouth 2 times daily as needed for anxiety       FLUoxetine 20 MG capsule     PROzac    90 capsule    Take 3 capsules (60 mg) by mouth daily       folic acid 1 MG tablet    FOLVITE         HUMIRA 40 MG/0.8ML prefilled syringe kit   Generic drug:  adalimumab      Inject 40 mg Subcutaneous       hydrOXYzine 25 MG tablet    ATARAX    90 tablet    Take 1-2 tablets (25-50 mg) by mouth every 6 hours as needed for anxiety       IBUPROFEN PO      Take 800 mg by mouth every 6 hours as needed for moderate pain       methotrexate 2.5 MG tablet CHEMO      6 tablets 6- tablets every Sunday       methylPREDNISolone 4 MG tablet    MEDROL DOSEPAK    21 tablet    Follow package instructions       oxyCODONE 5 MG capsule    OXY-IR     Take 5 mg by mouth every 6 hours as needed for moderate to severe pain

## 2017-02-09 NOTE — PROGRESS NOTES
SUBJECTIVE:                                                    Jailene Breen is a 51 year old male who presents to clinic today for the following health issues:    Abnormal Mood Symptoms     Onset: 1 week      Description:   Depression: YES  Anxiety: YES    Accompanying Signs & Symptoms:  Still participating in activities that you used to enjoy: no  Fatigue: no   Irritability: no   Difficulty concentrating: no   Changes in appetite: no   Problems with sleep: YES  Heart racing/beating fast : YES  Thoughts of hurting yourself or others: none     History:   Recent stress: YES  Prior depression hospitalization: None  Family history of depression: no  Family history of anxiety: YES      Precipitating factors:   Alcohol/drug use: no    Alleviating factors:  Klonopin (Clonazepam)       Therapies Tried and outcome: Clonazepam.        Problem list and histories reviewed & adjusted, as indicated.  Additional history: as documented    Patient Active Problem List   Diagnosis     CARDIOVASCULAR SCREENING; LDL GOAL LESS THAN 160     Anxiety     Overweight (BMI 25.0-29.9)     Back pain     Tear meniscus knee, right, initial encounter     Rheumatoid arthritis involving multiple sites, unspecified rheumatoid factor presence (H)     Chronic pain     Right-sided thoracic back pain, unspecified chronicity     JEFF (generalized anxiety disorder)     Panic attack     Past Surgical History   Procedure Laterality Date     Orthopedic surgery       Rt knee X 2     Orthopedic surgery       Lt foot     Arthroscopy knee Right 9/22/2016     Procedure: ARTHROSCOPY KNEE;  Surgeon: Fredo Hughes MD;  Location: [facility] OR     Inject paravertebral facet joint lumbar / sacral first Right 11/25/2016     Procedure: INJECT PARAVERTEBRAL FACET JOINT LUMBAR / SACRAL FIRST;  Surgeon: Doretha Magallanes MD;  Location:  OR       Social History   Substance Use Topics     Smoking status: Never Smoker      Smokeless tobacco: Current User     Types:  "Chew      Comment: Chew     Alcohol Use: No      Comment: none     No family history on file.      Current Outpatient Prescriptions   Medication Sig Dispense Refill     oxyCODONE (OXY-IR) 5 MG capsule Take 5 mg by mouth every 6 hours as needed for moderate to severe pain       clonazePAM (KLONOPIN) 0.5 MG tablet Take 0.5 tablets (0.25 mg) by mouth 2 times daily as needed for anxiety 16 tablet 0     FLUoxetine (PROZAC) 20 MG capsule Take 3 capsules (60 mg) by mouth daily 90 capsule 3     hydrOXYzine (ATARAX) 25 MG tablet Take 1-2 tablets (25-50 mg) by mouth every 6 hours as needed for anxiety 90 tablet 3     methylPREDNISolone (MEDROL DOSEPAK) 4 MG tablet Follow package instructions 21 tablet 0     methotrexate 2.5 MG tablet 6 tablets 6- tablets every Sunday       adalimumab (HUMIRA) 40 MG/0.8ML prefilled syringe kit Inject 40 mg Subcutaneous       folic acid (FOLVITE) 1 MG tablet        IBUPROFEN PO Take 800 mg by mouth every 6 hours as needed for moderate pain       [DISCONTINUED] FLUoxetine (PROZAC) 40 MG capsule Take 1 capsule (40 mg) by mouth daily 90 capsule 1     Allergies   Allergen Reactions     Hydrocodone Itching     Remeron Soltab Other (See Comments)     \"Blacked out\" for one week       ROS:  C: NEGATIVE for fever, chills, change in weight  E/M: NEGATIVE for ear, mouth and throat problems  R: NEGATIVE for significant cough or SOB  CV: NEGATIVE for chest pain, palpitations or peripheral edema  PSYCHIATRIC: POSITIVE foranxiety, agitation and anxiety    OBJECTIVE:                                                    /84 mmHg  Pulse 93  Temp(Src) 97.7  F (36.5  C) (Oral)  Wt 212 lb 9.6 oz (96.435 kg)  SpO2 100%  Body mass index is 25.89 kg/(m^2).  GENERAL: crying  EYES: Eyes grossly normal to inspection, PERRL and conjunctivae and sclerae normal  HENT: ear canals and TM's normal, nose and mouth without ulcers or lesions  NECK: no adenopathy, no asymmetry, masses, or scars and thyroid normal to " palpation  RESP: lungs clear to auscultation - no rales, rhonchi or wheezes  CV: regular rate and rhythm, normal S1 S2, no S3 or S4, no murmur, click or rub, no peripheral edema and peripheral pulses strong  ABDOMEN: soft, nontender, no hepatosplenomegaly, no masses and bowel sounds normal  MS: no gross musculoskeletal defects noted, no edema  SKIN: no suspicious lesions or rashes  NEURO: Normal strength and tone, mentation intact and speech normal  PSYCH: tearful and anxious    Diagnostic Test Results:  none      ASSESSMENT/PLAN:                                                        ICD-10-CM    1. Panic attack F41.0 clonazePAM (KLONOPIN) 0.5 MG tablet     Patient is very agitated, tearful and anxious, I offered to prescribe a dose of xanax, he declined and complained that he has used before and it does not help.  I advised him to follow up with PCP or behavioral health.    Nay Simmons NP  [facility]

## 2017-02-09 NOTE — PATIENT INSTRUCTIONS
Panic Attack  A panic attack is an extreme fear reaction that comes on for no clear reason. There is often a fear that something terrible will happen or that you may die. The attack may last a few minutes up to a few hours. Between attacks, things will seem quite normal. This condition has a psychological cause and can be treated with the help of a therapist or psychiatrist. Medicine can be very helpful for this problem.  Panic attacks usually come on suddenly, reaches a peak within minutes, and includes at least 4 of these symptoms:    Palpitations, pounding heart, or accelerated heart rate    Sweating    Crying    Trembling or shaking    Sensations of shortness of breath or smothering    Feelings of choking    Chest pain or discomfort    Nausea or abdominal distress    Feeling dizzy, unsteady, light-headed, or faint    Numbness or tingling sensations    Fear of dying    Fear of going crazy or of losing control    Feelings of unreality, strangeness, or detachment from the environment  Many of these symptoms can be linked to physical problems, so it is sometimes necessary to rule out conditions like thyroid disorders, heart disease, gastrointestinal problems, and others. They can also start as physical symptoms, but psychologically we may react to them in a fearful way, worsening the way we react and feel.  Home care    Try to find the sources of stress in your life. They may not be obvious. These may include:    Daily hassles of life which pile up (traffic jams, missed appointments, car troubles, etc.).    Major life changes, both good (new baby, job promotion) and bad (loss of job, loss of loved one).    Feeling that you have too many responsibilities and can't take care of everything at once.    Helplessness: feeling like your problems are too much for you to handle.    Notice how your body reacts to stress. Learn to listen to your body signals so that you can take action before the stress becomes  severe.    Try to be aware of what you were doing before the reaction started; this may give you clues to things that can trigger a reaction. It may be situations in your life, or what you were doing at the time.    When possible, avoid or reduce the cause of stress. Avoid hassles, limit the amount of change that is happening in your life at one time or take a break when you feel overloaded.    Unfortunately, many stressful situations cannot be avoided. Therefore, it is necessary to learn how to manage stress better. There are many proven methods that work and will reduce your anxiety. These include simple things like exercise, good nutrition and adequate rest. Also, there are certain techniques that are helpful: relaxation and breathing exercises, visualization, biofeedback, meditation or simply taking some time-out to clear your mind. For more information about this, ask your doctor or go to a local bookstore and review the many books and tapes available on this subject.  Follow-up care  Follow-up with your healthcare provider, or as advised.  Call 911  Call 911 if any of these occur:    Trouble breathing    Confusion    Very drowsy or trouble awakening    Fainting or loss of consciousness    Rapid heart rate    Seizure    New chest pain that becomes more severe, lasts longer, or spreads into your shoulder, arm, neck, jaw or back  When to seek medical advice  Call your healthcare provider right away if any of these occur:    Worsening of your symptoms to the point of feeling out-of-control    Increased pain with breathing    Increasing feeling of weakness or dizziness    Cough with dark colored sputum (phlegm) or blood    Fever 1 degree above normal temperature ) lasting 24 to 48 hours or what your healthcare provider has advised    Swelling, pain or redness in one leg    Requests by family or friends for you to seek help for your symptoms    [id number] [employer]. [address] [facility], [place]  [address]. All rights reserved. This information is not intended as a substitute for professional medical care. Always follow your healthcare professional's instructions.

## 2017-02-09 NOTE — NURSING NOTE
"Chief Complaint   Patient presents with     Urgent Care     Anxiety       Initial /84 mmHg  Pulse 93  Temp(Src) 97.7  F (36.5  C) (Oral)  Wt 212 lb 9.6 oz (96.435 kg)  SpO2 100% Estimated body mass index is 25.89 kg/(m^2) as calculated from the following:    Height as of 1/13/17: 6' 4\" (1.93 m).    Weight as of this encounter: 212 lb 9.6 oz (96.435 kg).  Medication Reconciliation: complete   Randa Galvez      "

## 2017-02-10 ENCOUNTER — OFFICE VISIT (OUTPATIENT)
Dept: FAMILY MEDICINE | Facility: CLINIC | Age: 52
End: 2017-02-10
Payer: COMMERCIAL

## 2017-02-10 ENCOUNTER — TELEPHONE (OUTPATIENT)
Dept: PALLIATIVE MEDICINE | Facility: CLINIC | Age: 52
End: 2017-02-10

## 2017-02-10 ENCOUNTER — MYC MEDICAL ADVICE (OUTPATIENT)
Dept: FAMILY MEDICINE | Facility: CLINIC | Age: 52
End: 2017-02-10

## 2017-02-10 ENCOUNTER — TELEPHONE (OUTPATIENT)
Dept: FAMILY MEDICINE | Facility: CLINIC | Age: 52
End: 2017-02-10

## 2017-02-10 ENCOUNTER — E-VISIT (OUTPATIENT)
Dept: URGENT CARE | Facility: URGENT CARE | Age: 52
End: 2017-02-10

## 2017-02-10 VITALS
DIASTOLIC BLOOD PRESSURE: 85 MMHG | OXYGEN SATURATION: 100 % | SYSTOLIC BLOOD PRESSURE: 120 MMHG | TEMPERATURE: 97.9 F | WEIGHT: 209 LBS | BODY MASS INDEX: 25.45 KG/M2 | HEART RATE: 101 BPM

## 2017-02-10 DIAGNOSIS — R39.9 LOWER URINARY TRACT SYMPTOMS (LUTS): ICD-10-CM

## 2017-02-10 DIAGNOSIS — G89.29 CHRONIC BACK PAIN, UNSPECIFIED BACK LOCATION, UNSPECIFIED BACK PAIN LATERALITY: Primary | ICD-10-CM

## 2017-02-10 DIAGNOSIS — Z53.9 ERRONEOUS ENCOUNTER--DISREGARD: Primary | ICD-10-CM

## 2017-02-10 DIAGNOSIS — M54.9 CHRONIC BACK PAIN, UNSPECIFIED BACK LOCATION, UNSPECIFIED BACK PAIN LATERALITY: Primary | ICD-10-CM

## 2017-02-10 PROCEDURE — 99214 OFFICE O/P EST MOD 30 MIN: CPT | Performed by: PHYSICIAN ASSISTANT

## 2017-02-10 RX ORDER — OXYCODONE HCL 10 MG/1
10 TABLET, FILM COATED, EXTENDED RELEASE ORAL EVERY 12 HOURS PRN
Qty: 14 TABLET | Refills: 0 | Status: SHIPPED | OUTPATIENT
Start: 2017-02-24 | End: 2017-02-27

## 2017-02-10 RX ORDER — OXYCODONE HCL 10 MG/1
TABLET, FILM COATED, EXTENDED RELEASE ORAL
Qty: 28 TABLET | Refills: 0 | Status: SHIPPED | OUTPATIENT
Start: 2017-02-10 | End: 2017-02-27

## 2017-02-10 RX ORDER — OXYCODONE HCL 10 MG/1
10 TABLET, FILM COATED, EXTENDED RELEASE ORAL DAILY
Qty: 7 TABLET | Refills: 0 | Status: SHIPPED | OUTPATIENT
Start: 2017-02-10 | End: 2017-02-27

## 2017-02-10 RX ORDER — DULOXETIN HYDROCHLORIDE 30 MG/1
CAPSULE, DELAYED RELEASE ORAL
Qty: 60 CAPSULE | Refills: 3 | Status: SHIPPED | OUTPATIENT
Start: 2017-02-10 | End: 2017-07-28

## 2017-02-10 RX ORDER — OXYCODONE HCL 10 MG/1
TABLET, FILM COATED, EXTENDED RELEASE ORAL
Qty: 21 TABLET | Refills: 0 | Status: SHIPPED | OUTPATIENT
Start: 2017-02-10 | End: 2017-02-27

## 2017-02-10 RX ORDER — TAMSULOSIN HYDROCHLORIDE 0.4 MG/1
0.4 CAPSULE ORAL DAILY
Qty: 30 CAPSULE | Refills: 3 | Status: SHIPPED | OUTPATIENT
Start: 2017-02-10 | End: 2017-07-28

## 2017-02-10 NOTE — Clinical Note
[address] [address]  Phone: [phone]    February 10, 2017        Jailene Breen  [id number] [facility] [address]          To whom it may concern:    RE: Jailene Breen    Patient was seen and treated today at our clinic.  Patient excused from today.  Patient may return to work without restrictions after that.        Please contact me for questions or concerns.      Sincerely,        JASON Rice

## 2017-02-10 NOTE — MR AVS SNAPSHOT
After Visit Summary   2/10/2017    Jailene Breen    MRN: [id number]           Patient Information     Date Of Birth          1965        Visit Information        Provider Department      2/10/2017 9:00 AM Aiden Resendez PA [facility]        Today's Diagnoses     Chronic back pain, unspecified back location, unspecified back pain laterality    -  1     Lower urinary tract symptoms (LUTS)           Care Instructions    Start taking 2 tabs of prozac for 7 days then take 1 tab for 1 week then stop.        Based on your medical history and these are the current health maintenance or preventive care services that you are due for (some may have been done at this visit)  Health Maintenance Due   Topic Date Due     LIPID SCREEN Q5 YR MALE (SYSTEM ASSIGNED)  10/06/2002     INFLUENZA VACCINE (SYSTEM ASSIGNED)  09/01/2016       At [facility], we strive to deliver an exceptional experience to you, every time we see you.    If you receive a survey in the mail, please send us back your thoughts. We really do value your feedback.    Your care team's suggested websites for health information:  Www.[employer].org : Up to date and easily searchable information on multiple topics.  Www.medlineplus.gov : medication info, interactive tutorials, watch real surgeries online  Www.familydoctor.org : good info from the Academy of Family Physicians  Www.cdc.gov : public health info, travel advisories, epidemics (H1N1)  Www.aap.org : children's health info, normal development, vaccinations  Www.health.[facility].[place].us : [place] dept of health, public health issues in [place], N1N1    How to contact your care team:   Urgent Care/Same Day [phone]         Pharmacy [phone]      Clinic hours  M-Th 7 am-7 pm   Fri 7 am-5 pm.   Urgent care M-F 11 am-9 pm,   Sat/Sun 9 am-5 pm.  Pharmacy M-Th 8 am-8 pm Fri 8 am-6 pm  Sat/Sun 9 am-5 pm.     All password changes, disabled accounts,  or ID changes in MyChart/MyHealth will be done by our Access Services Department.    If you need help with your account or password, call: [phone]. Clinic staff no longer has the ability to change passwords.     Apply ice for 24 hours then alternate heat or ice, which ever feels better. Return to clinic or Emergency Department  for uncontrolled pain, chest pain, shortness of breath, fever, numbness, incontinence or urinary problems.    Neck/Back Pain [General]  Both neck and back pain are usually caused by injury to the muscles or ligaments of the spine. Sometimes the disks that separate each bone of the spine may cause pain by putting pressure on a nearby nerve. Back and neck pain may appear after a sudden twisting/bending force (such as in a car accident), or sometimes after a simple awkward movement. In either case, muscle spasm is often present and adds to the pain.  Acute neck and back pain usually gets better in one to two weeks. Pain related to disk disease, arthritis in the spinal joints or spinal stenosis (narrowing of the spinal canal) can become chronic and last for months or years.  Home Care:  FOR NECK PAIN: Use a comfortable pillow that supports the head and keeps the spine in a neutral position. The position of the head should not be tilted forward or backward.  FOR BACK PAIN: You may need to stay in bed the first few days. But, as soon as possible, begin sitting or walking to avoid problems with prolonged bed rest (muscle weakness, worsening back stiffness and pain, blood clots in the legs).  When in bed, try to find a position of comfort. A firm mattress is best. Try lying flat on your back with pillows under your knees. You can also try lying on your side with your knees bent up towards your chest and a pillow between your knees.  Avoid prolonged sitting. This puts more stress on the lower back than standing or walking.  During the first two days after injury, apply an ICE PACK to the  painful area for 20 minutes every 2-4 hours. This will reduce swelling and pain. HEAT (hot shower, hot bath or heating pad) works well for muscle spasm. You can start with ice, then switch to heat after two days. Some patients feel best alternating ice and heat treatments. Use the one method that feels the best to you.  You may use acetaminophen (Tylenol) or ibuprofen (Motrin, Advil) to control pain, unless another pain medicine was prescribed. [NOTE: If you have chronic liver or kidney disease or ever had a stomach ulcer or GI bleeding, talk with your doctor before using these medicines.]  Be aware of safe lifting methods and do not lift anything over 15 pounds until all the pain is gone.  Follow Up  with your physician or this facility if your symptoms do not start to improve after one week. Physical therapy or further tests may be needed.  [NOTE: If X-rays were taken, they will be reviewed by a radiologist. You will be notified of any new findings that may affect your care.]  Get Prompt Medical Attention  if any of the following occur:  Pain becomes worse or spreads into your arms or legs  Weakness, numbness or pain in one or both arms or legs  Loss of bowel or bladder control  Numbness in the groin area  Difficulty walking  Fever of 100.4 F (38 C) or higher, or as directed by your healthcare provider    [id number] Robert Leal, [address] [place] [address], [place] [id number]. All rights reserved. This information is not intended as a substitute for professional medical care. Always follow your healthcare professional's instructions.                Follow-ups after your visit        Additional Services     ORTHO  REFERRAL       [facility] is referring you to the Orthopedic  Services at [facility].       The  Representative will assist you in the coordination of your Orthopedic and Musculoskeletal Care as prescribed by your physician.    The   Representative will call you within 1 business day to help schedule your appointment, or you may contact the [facility] Representative at:    All areas ~ [phone]     Type of Referral : Pain Interventionist  Spine: Lumbar  **Choose Medical Spine Specialist (unless patient was seen by a Medical Spine Specialist within the past 6 months).**  Surgical Evaluation is advised if the patient presents with one or more of the following red flags: Evidence of Spinal Tumor, Infection or Fracture, Cauda Equina Syndrome, Sudden or Progressive Weakness, Loss of Bowel or Bladder Control, or any other documented emergent neurological condition resulting from a Lumbar Spinal Condition. Medical Spine Specialist        Timeframe requested: Within 2 weeks    Coverage of these services is subject to the terms and limitations of your health insurance plan.  Please call member services at your health plan with any benefit or coverage questions.      If X-rays, CT or MRI's have been performed, please contact the facility where they were done to arrange for , prior to your scheduled appointment.  Please bring this referral request to your appointment and present it to your specialist.            PAIN MANAGEMENT CENTER ([place]) REFERRAL       Your provider has referred you to the [facility].    Reason for Referral: Comprehensive Evaluation and Management    Please complete the following questions:    What is your diagnosis for the patient's pain? Back pain, RA    Do you have any specific questions for the pain specialist? No    Are there any red flags that may impact the assessment or management of the patient? Mental Illness / Communication Difficulties (explain): anxiety and panic attacks and Patient has already been evaluated/treated at a pain clinic: Where: [facility] pain management  When: last year    **ANY DIAGNOSTIC TESTS THAT ARE NOT IN [facility] SHOULD BE SENT TO THE PAIN CENTER**    Please note the  Pre-Op Pain Consult must be scheduled 2-3 weeks prior to the patient's surgery.  Patient's trying to schedule within 2 weeks of surgery may not be accommodated.     Pre-Op Pain Consults are only good for 30 days.    REGARDING OPIOID MEDICATIONS:  We will always address appropriateness of opioid pain medications, but we generally will not automatically take on a prescribing role. When we do take on prescribing of opioids for chronic pain, it is in collaboration with the referring physician for an intermediate period of time (months), with an expectation that the primary physician or provider will assume the prescribing role if medications are effective at stable doses with demonstrated compliance.  Therefore, please do not assume that your prescribing responsibilities end on the day of pain clinic consultation.  Is this agreeable to you? YES    For any questions, contact the [facility] at [phone].    Please be aware that coverage of these services is subject to the terms and limitations of your health insurance plan.  Call member services at your health plan with any benefit or coverage questions.      Please bring the following with you to your appointment:    (1) Any X-Rays, CTs or MRIs which have been performed.  Contact the facility where they were done to arrange for  prior to your scheduled appointment.    (2) List of current medications   (3) This referral request   (4) Any documents/labs given to you for this referral                  Follow-up notes from your care team     Return if symptoms worsen or fail to improve.      Who to contact     If you have questions or need follow up information about today's clinic visit or your schedule please contact [facility] directly at [phone].  Normal or non-critical lab and imaging results will be communicated to you by MyChart, letter or phone within 4 business days after the clinic has received the results.  If you do not hear from us within 7 days, please contact the clinic through [employer] or phone. If you have a critical or abnormal lab result, we will notify you by phone as soon as possible.  Submit refill requests through [employer] or call your pharmacy and they will forward the refill request to us. Please allow 3 business days for your refill to be completed.          Additional Information About Your Visit        [facility] [employer] Information     [employer] gives you secure access to your electronic health record. If you see a primary care provider, you can also send messages to your care team and make appointments. If you have questions, please call your primary care clinic.  If you do not have a primary care provider, please call [phone] and they will assist you.        Care EveryWhere ID     This is your Care EveryWhere ID. This could be used by other organizations to access your [place] medical records  [id number]        Your Vitals Were     Pulse Temperature Pulse Oximetry             101 97.9  F (36.6  C) (Oral) 100%          Blood Pressure from Last 3 Encounters:   02/10/17 120/85   02/09/17 138/84   02/04/17 137/87    Weight from Last 3 Encounters:   02/10/17 209 lb (94.802 kg)   02/09/17 212 lb 9.6 oz (96.435 kg)   02/04/17 213 lb (96.616 kg)              We Performed the Following     [facility] REFERRAL     PAIN MANAGEMENT CENTER ([place]) REFERRAL          Today's Medication Changes          These changes are accurate as of: 2/10/17  9:58 AM.  If you have any questions, ask your nurse or doctor.               Start taking these medicines.        Dose/Directions    DULoxetine 30 MG EC capsule   Commonly known as:  CYMBALTA   Used for:  Chronic back pain, unspecified back location, unspecified back pain laterality   Started by:  Aiden Resendez PA        1 tab daily for 1 week then 1 tab BID   Quantity:  60 capsule   Refills:  3       tamsulosin 0.4 MG capsule   Commonly known as:  FLOMAX    Used for:  Lower urinary tract symptoms (LUTS)   Started by:  Aiden Resendez PA        Dose:  0.4 mg   Take 1 capsule (0.4 mg) by mouth daily   Quantity:  30 capsule   Refills:  3         These medicines have changed or have updated prescriptions.        Dose/Directions    methylPREDNISolone 4 MG tablet   Commonly known as:  MEDROL DOSEPAK   This may have changed:    - when to take this  - reasons to take this  - additional instructions        Follow package instructions   Quantity:  21 tablet   Refills:  0       * oxyCODONE 5 MG capsule   Commonly known as:  OXY-IR   This may have changed:  Another medication with the same name was added. Make sure you understand how and when to take each.   Changed by:  Nay Simmons NP        Dose:  5 mg   Take 5 mg by mouth every 6 hours as needed for moderate to severe pain   Refills:  0       * oxyCODONE 10 MG 12 hr tablet   Commonly known as:  OxyCONTIN   This may have changed:  You were already taking a medication with the same name, and this prescription was added. Make sure you understand how and when to take each.   Used for:  Chronic back pain, unspecified back location, unspecified back pain laterality   Changed by:  Aiden Resendez PA        1 tab q 6 hours daily for 1 week   Quantity:  28 tablet   Refills:  0       * oxyCODONE 10 MG 12 hr tablet   Commonly known as:  OxyCONTIN   This may have changed:  You were already taking a medication with the same name, and this prescription was added. Make sure you understand how and when to take each.   Used for:  Chronic back pain, unspecified back location, unspecified back pain laterality   Changed by:  Aiden Resendez PA        1 tab 3 times daily for one week.   Quantity:  21 tablet   Refills:  0       * oxyCODONE 10 MG 12 hr tablet   Commonly known as:  OxyCONTIN   This may have changed:  You were already taking a medication with the same name, and this prescription was added. Make  sure you understand how and when to take each.   Used for:  Chronic back pain, unspecified back location, unspecified back pain laterality   Changed by:  Aiden Resendez PA        Dose:  10 mg   Take 1 tablet (10 mg) by mouth daily   Quantity:  7 tablet   Refills:  0       * oxyCODONE 10 MG 12 hr tablet   Commonly known as:  OxyCONTIN   This may have changed:  You were already taking a medication with the same name, and this prescription was added. Make sure you understand how and when to take each.   Used for:  Lower urinary tract symptoms (LUTS)   Changed by:  Aiden Resendez PA        Dose:  10 mg   Start taking on:  2/24/2017   Take 1 tablet (10 mg) by mouth every 12 hours as needed for moderate to severe pain maximum 2 tablet(s) per day   Quantity:  14 tablet   Refills:  0       * Notice:  This list has 5 medication(s) that are the same as other medications prescribed for you. Read the directions carefully, and ask your doctor or other care provider to review them with you.         Where to get your medicines      These medications [place] [facility] [place] [address]  [address], [facility] [address]     Phone:  [phone]    - DULoxetine 30 MG EC capsule  - tamsulosin 0.4 MG capsule      Some of these will need a paper prescription and others can be bought over the counter.  Ask your nurse if you have questions.     Bring a paper prescription for each of these medications    - oxyCODONE 10 MG 12 hr tablet  - oxyCODONE 10 MG 12 hr tablet  - oxyCODONE 10 MG 12 hr tablet  - oxyCODONE 10 MG 12 hr tablet             Primary Care Provider    [facility]       No address on file        Thank you!     Thank you for choosing [facility]  for your care. Our goal is always to provide you with excellent care. Hearing back from our patients is one way we can continue to improve our services. Please  take a few minutes to complete the written survey that you may receive in the mail after your visit with us. Thank you!             Your Updated Medication List - Protect others around you: Learn how to safely use, store and throw away your medicines at www.disposemymeds.org.          This list is accurate as of: 2/10/17  9:58 AM.  Always use your most recent med list.                   Brand Name Dispense Instructions for use    clonazePAM 0.5 MG tablet    klonoPIN    16 tablet    Take 0.5 tablets (0.25 mg) by mouth 2 times daily as needed for anxiety       DULoxetine 30 MG EC capsule    CYMBALTA    60 capsule    1 tab daily for 1 week then 1 tab BID       FLUoxetine 20 MG capsule    PROzac    90 capsule    Take 3 capsules (60 mg) by mouth daily       folic acid 1 MG tablet    FOLVITE         HUMIRA 40 MG/0.8ML prefilled syringe kit   Generic drug:  adalimumab      Inject 40 mg Subcutaneous       hydrOXYzine 25 MG tablet    ATARAX    90 tablet    Take 1-2 tablets (25-50 mg) by mouth every 6 hours as needed for anxiety       IBUPROFEN PO      Take 800 mg by mouth every 6 hours as needed for moderate pain       methotrexate 2.5 MG tablet CHEMO      6 tablets 6- tablets every Sunday       methylPREDNISolone 4 MG tablet    MEDROL DOSEPAK    21 tablet    Follow package instructions       * oxyCODONE 5 MG capsule    OXY-IR     Take 5 mg by mouth every 6 hours as needed for moderate to severe pain       * oxyCODONE 10 MG 12 hr tablet    OxyCONTIN    28 tablet    1 tab q 6 hours daily for 1 week       * oxyCODONE 10 MG 12 hr tablet    OxyCONTIN    21 tablet    1 tab 3 times daily for one week.       * oxyCODONE 10 MG 12 hr tablet    OxyCONTIN    7 tablet    Take 1 tablet (10 mg) by mouth daily       * oxyCODONE 10 MG 12 hr tablet   Start taking on:  2/24/2017    OxyCONTIN    14 tablet    Take 1 tablet (10 mg) by mouth every 12 hours as needed for moderate to severe pain maximum 2 tablet(s) per day       tamsulosin 0.4 MG  capsule    FLOMAX    30 capsule    Take 1 capsule (0.4 mg) by mouth daily       * Notice:  This list has 5 medication(s) that are the same as other medications prescribed for you. Read the directions carefully, and ask your doctor or other care provider to review them with you.

## 2017-02-10 NOTE — TELEPHONE ENCOUNTER
Pain Management Center Referral      1. Confirmed address with patient? Yes  2. Confirmed phone number with patient? Yes  3. Confirmed referring provider? Yes  4. Is the PCP the same as the referring provider? No  5. Has the patient been to any previous pain clinics? Yes-[facility] pain clinic  (If yes, send EVANGELINA with welcome letter)  6. Which insurance are we to bill for this appointment?  [employer]    7. Informed pt of cancellation (48 hour) policy? Yes    REGARDING OPIOID MEDICATIONS: We will always address appropriateness of opioid pain medications, but we generally will not automatically take on a prescribing role. When we do take on prescribing of opioids for chronic pain, it is in collaboration with the referring physician for an intermediate period of time (months), with an expectation that the primary physician or provider will assume the prescribing role if medications are effective at stable doses with demonstrated compliance. Therefore, please do not assume that your prescribing responsibilities end on the day of pain clinic consultation.  7. Informed pt of prescribing policy? Yes      8. Referring Provider: Aiden Resendez PA    9. Criteria for Triage Eval:   -Missed/Failed 1st DUAL appointment? N/A   -Medication Focused? N/A   -Mental Health Concerns? (e.g. Recent psych hospitalization/snap shot)? N/A   -Active substance abuse? N/A   -Patient behaviors (e.g. Offensive language/raised voice)? N/A

## 2017-02-10 NOTE — NURSING NOTE
"Chief Complaint   Patient presents with     Back Pain     [facility] f/u: pt was seen on 2/4/2017       Initial /85 mmHg  Pulse 101  Temp(Src) 97.9  F (36.6  C) (Oral)  Wt 209 lb (94.802 kg)  SpO2 100% Estimated body mass index is 25.45 kg/(m^2) as calculated from the following:    Height as of 1/13/17: 6' 4\" (1.93 m).    Weight as of this encounter: 209 lb (94.802 kg).  Medication Reconciliation: incomplete. MARY JO Phelps      "

## 2017-02-10 NOTE — TELEPHONE ENCOUNTER
Please call patient and ask him if and/or when he will be seeing pain management, and if he has seen them, have they taken over prescribing opiates for him?  If not, have him schedule an appt with me in 1-2 weeks to discuss continuing the taper started at previous visit.    Ruperto Resendez PA-C
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
## 2017-02-10 NOTE — PATIENT INSTRUCTIONS
Start taking 2 tabs of prozac for 7 days then take 1 tab for 1 week then stop.        Based on your medical history and these are the current health maintenance or preventive care services that you are due for (some may have been done at this visit)  Health Maintenance Due   Topic Date Due     LIPID SCREEN Q5 YR MALE (SYSTEM ASSIGNED)  10/06/2002     INFLUENZA VACCINE (SYSTEM ASSIGNED)  09/01/2016       At [facility], we strive to deliver an exceptional experience to you, every time we see you.    If you receive a survey in the mail, please send us back your thoughts. We really do value your feedback.    Your care team's suggested websites for health information:  Www.[facility].org : Up to date and easily searchable information on multiple topics.  Www.medlineplus.gov : medication info, interactive tutorials, watch real surgeries online  Www.familydoctor.org : good info from the Academy of Family Physicians  Www.cdc.gov : public health info, travel advisories, epidemics (H1N1)  Www.aap.org : children's health info, normal development, vaccinations  Www.health.[facility].[place].us : [place] dept of health, public health issues in [place], N1N1    How to contact your care team:   Urgent Care/Same Day [phone]         Pharmacy [phone]      Clinic hours  M-Th 7 am-7 pm   Fri 7 am-5 pm.   Urgent care M-F 11 am-9 pm,   Sat/Sun 9 am-5 pm.  Pharmacy M-Th 8 am-8 pm Fri 8 am-6 pm  Sat/Sun 9 am-5 pm.     All password changes, disabled accounts, or ID changes in [employer]/MyHealth will be done by our Access Services Department.    If you need help with your account or password, call: [phone]. Clinic staff no longer has the ability to change passwords.     Apply ice for 24 hours then alternate heat or ice, which ever feels better. Return to clinic or Emergency Department  for uncontrolled pain, chest pain, shortness of breath, fever, numbness, incontinence or urinary problems.    Neck/Back Pain  [General]  Both neck and back pain are usually caused by injury to the muscles or ligaments of the spine. Sometimes the disks that separate each bone of the spine may cause pain by putting pressure on a nearby nerve. Back and neck pain may appear after a sudden twisting/bending force (such as in a car accident), or sometimes after a simple awkward movement. In either case, muscle spasm is often present and adds to the pain.  Acute neck and back pain usually gets better in one to two weeks. Pain related to disk disease, arthritis in the spinal joints or spinal stenosis (narrowing of the spinal canal) can become chronic and last for months or years.  Home Care:  FOR NECK PAIN: Use a comfortable pillow that supports the head and keeps the spine in a neutral position. The position of the head should not be tilted forward or backward.  FOR BACK PAIN: You may need to stay in bed the first few days. But, as soon as possible, begin sitting or walking to avoid problems with prolonged bed rest (muscle weakness, worsening back stiffness and pain, blood clots in the legs).  When in bed, try to find a position of comfort. A firm mattress is best. Try lying flat on your back with pillows under your knees. You can also try lying on your side with your knees bent up towards your chest and a pillow between your knees.  Avoid prolonged sitting. This puts more stress on the lower back than standing or walking.  During the first two days after injury, apply an ICE PACK to the painful area for 20 minutes every 2-4 hours. This will reduce swelling and pain. HEAT (hot shower, hot bath or heating pad) works well for muscle spasm. You can start with ice, then switch to heat after two days. Some patients feel best alternating ice and heat treatments. Use the one method that feels the best to you.  You may use acetaminophen (Tylenol) or ibuprofen (Motrin, Advil) to control pain, unless another pain medicine was prescribed. [NOTE: If you have  chronic liver or kidney disease or ever had a stomach ulcer or GI bleeding, talk with your doctor before using these medicines.]  Be aware of safe lifting methods and do not lift anything over 15 pounds until all the pain is gone.  Follow Up  with your physician or this facility if your symptoms do not start to improve after one week. Physical therapy or further tests may be needed.  [NOTE: If X-rays were taken, they will be reviewed by a radiologist. You will be notified of any new findings that may affect your care.]  Get Prompt Medical Attention  if any of the following occur:  Pain becomes worse or spreads into your arms or legs  Weakness, numbness or pain in one or both arms or legs  Loss of bowel or bladder control  Numbness in the groin area  Difficulty walking  Fever of 100.4 F (38 C) or higher, or as directed by your healthcare provider    [id number] Robert Leal, [address] [place] [address], [place] [address]. All rights reserved. This information is not intended as a substitute for professional medical care. Always follow your healthcare professional's instructions.

## 2017-02-10 NOTE — Clinical Note
February 10, 2017    Jailene Breen  [address]   [place] [address]    Dear Jailene,                                                                 Welcome to the [facility].  We are located on the 2nd floor ([address]) of the [facility], located at [address] [place], [place] [address], [place] [address].    Your appointment at the [facility] has been scheduled on Friday MARCH 31, 2017 at 9:00 AM with Susana Pike NP.    At your first visit, you will meet your team of caregivers who will help you to develop pain management strategies that will last a lifetime. You will meet with our support staff to review your insurance information, and collect your co-payment if required by your insurance company. You will also meet with a medical pain specialist and care coordinator who will assess your pain and develop a plan of care for your successful pain rehabilitation. You should expect to spend 1-2 hours at your first visit with us. Usually, patients work with us for a period of 6-12 months, and eventually return to their primary doctor once their pain management has stabilized.      To help us make your visit go as smoothly as possible, please bring the following items with you on your visit:     Completed Pain Questionnaire enclosed in this packet.  If you do not bring the completed questionnaire, we may have to reschedule your appointment.  List of any medicines that you are currently taking or have been prescribed  Important NON-[place] medical information such as medical records or tests results (X-rays, or laboratory tests)  Your health insurance card  Financial resources to cover your co-payment or balance due at the time of service (cash, personal check, Visa, and MasterCard are acceptable methods of payment)     Due to the demand for new patient evaluations, you must notify the scheduling department 48 hours in advance if you are not able to keep this  appointment. Failure to do so could affect your ability to reschedule with our clinic. Please be aware that we will not prescribe any medications at your first visit.     Please call [phone] with any questions regarding your appointment. We look forward to meeting you and working to address your health care needs.     Sincerely,    [facility]

## 2017-02-11 ENCOUNTER — TELEPHONE (OUTPATIENT)
Dept: URGENT CARE | Facility: URGENT CARE | Age: 52
End: 2017-02-11

## 2017-02-11 DIAGNOSIS — G89.29 CHRONIC BACK PAIN, UNSPECIFIED BACK LOCATION, UNSPECIFIED BACK PAIN LATERALITY: Primary | ICD-10-CM

## 2017-02-11 DIAGNOSIS — M54.9 CHRONIC BACK PAIN, UNSPECIFIED BACK LOCATION, UNSPECIFIED BACK PAIN LATERALITY: Primary | ICD-10-CM

## 2017-02-11 RX ORDER — OXYCODONE HYDROCHLORIDE 10 MG/1
10 TABLET ORAL EVERY 6 HOURS PRN
Qty: 28 TABLET | Refills: 0 | Status: SHIPPED | OUTPATIENT
Start: 2017-02-11 | End: 2017-02-27

## 2017-02-11 NOTE — TELEPHONE ENCOUNTER
Spoke with Ruperto Resendez who wrote a prescription for pt that needs to be changed.  Is not to have oxycontin, but is to have oxycodone 10 mg q6hrs.  I did new rx for pt, as Ruperto Resendez requested.  Rx printed and staff will give to pharmacy.   Pt informed by staff.

## 2017-02-12 ENCOUNTER — DOCUMENTATION ONLY (OUTPATIENT)
Dept: URGENT CARE | Facility: URGENT CARE | Age: 52
End: 2017-02-12

## 2017-02-12 DIAGNOSIS — G89.4 CHRONIC PAIN SYNDROME: Primary | ICD-10-CM

## 2017-02-12 NOTE — PROGRESS NOTES
Pharmacist called the patient about the old pills he was given since 23 used in 24 hrs. He said he was taking 1 pill every 4 hrs and then switched to 2 pills every 4 hrs and even had 2 before he came in this morning.

## 2017-02-12 NOTE — PROGRESS NOTES
Pt was instructed by pharmacy to bring in his pills that he had left and the corrected script would give to fill at pharmacy. Pharmacy was aware of the situation. We had multiple witnesses that he only gave 5 pills of 28 given yesterday. Pills were placed in the black box with witnesses. Note being routing to provider who prescribed meds.

## 2017-02-13 NOTE — PROGRESS NOTES
Called and spoke to pt, Pt stated he came in last night and gave the script to our pharmacy and one of the nurses came and took the med to the back. Ruperto did you get pt's medication? pls advise.  Stanley Solomon,  [facility] [employer] [facility]    Due to work schedule, pt could not make an appt for this week but pt is scheduled for next week Tuesday at 9a to see Ruperto Resendez PA-C.  Stanley Solomon,  [employer]

## 2017-02-13 NOTE — PROGRESS NOTES
Please contact patient and have him sched appt with me for this week to discuss.  Please have him bring the rxs from last week with him.    Ruperto Resendez PA-C

## 2017-02-13 NOTE — TELEPHONE ENCOUNTER
Looks like patient has pain appointment on 3/31 per [facility] appointments

## 2017-02-14 RX ORDER — OXYCODONE HYDROCHLORIDE 10 MG/1
10 TABLET ORAL EVERY 8 HOURS PRN
Qty: 21 TABLET | Refills: 0 | Status: SHIPPED | OUTPATIENT
Start: 2017-02-17 | End: 2017-07-28

## 2017-02-14 RX ORDER — OXYCODONE HYDROCHLORIDE 10 MG/1
10 TABLET ORAL EVERY 8 HOURS PRN
Qty: 21 TABLET | Refills: 0 | COMMUNITY
Start: 2017-02-17 | End: 2017-02-14

## 2017-02-14 NOTE — PROGRESS NOTES
I saw he brought in meds but much less than expected; though upon review of , it does not appear patient filled the new Rx and may have turned in the pills from outside provider from 2/7 (per ). The pharm note did not mention the prescriptions. Will discuss further with patient at upcoming vitis      I will place rx for next week of taper up fron , postdated for the 17th. along with a EVANGELINA for [facility] pain management for him to fill out so we can have their records for his visit next week. Please inform patient of this.    Ruperto Resendez PA-C

## 2017-02-17 NOTE — TELEPHONE ENCOUNTER
Called and spoke to patient and gave him Katarina's message and  Offered to give him Bass Saenz's # to call and schedule. He states   he already has this #.  Shanelle Glass MA/  [employer] [facility]

## 2017-02-17 NOTE — TELEPHONE ENCOUNTER
I do not manage chronic pain. Patient may try to see Dr. Wall at [facility], he manages chronic pain.  Katarina [place] PAC

## 2017-02-18 ENCOUNTER — MYC MEDICAL ADVICE (OUTPATIENT)
Dept: FAMILY MEDICINE | Facility: CLINIC | Age: 52
End: 2017-02-18

## 2017-02-19 ENCOUNTER — OFFICE VISIT (OUTPATIENT)
Dept: URGENT CARE | Facility: URGENT CARE | Age: 52
End: 2017-02-19
Payer: COMMERCIAL

## 2017-02-19 VITALS
TEMPERATURE: 97.8 F | BODY MASS INDEX: 25.81 KG/M2 | OXYGEN SATURATION: 100 % | DIASTOLIC BLOOD PRESSURE: 91 MMHG | SYSTOLIC BLOOD PRESSURE: 146 MMHG | HEART RATE: 88 BPM | WEIGHT: 212 LBS

## 2017-02-19 DIAGNOSIS — F41.0 PANIC ATTACK: Primary | ICD-10-CM

## 2017-02-19 DIAGNOSIS — M06.9 RHEUMATOID ARTHRITIS INVOLVING MULTIPLE SITES, UNSPECIFIED RHEUMATOID FACTOR PRESENCE: ICD-10-CM

## 2017-02-19 DIAGNOSIS — F41.1 GAD (GENERALIZED ANXIETY DISORDER): ICD-10-CM

## 2017-02-19 PROCEDURE — 99214 OFFICE O/P EST MOD 30 MIN: CPT | Performed by: FAMILY MEDICINE

## 2017-02-19 RX ORDER — CLONAZEPAM 1 MG/1
0.5-1 TABLET ORAL 2 TIMES DAILY PRN
Qty: 4 TABLET | Refills: 0 | OUTPATIENT
Start: 2017-02-19 | End: 2017-02-19

## 2017-02-19 RX ORDER — CYCLOBENZAPRINE HCL 5 MG
5 TABLET ORAL
COMMUNITY
End: 2017-08-08

## 2017-02-19 RX ORDER — CLONAZEPAM 1 MG/1
0.5-1 TABLET ORAL 2 TIMES DAILY PRN
Qty: 4 TABLET | Refills: 0 | Status: SHIPPED | OUTPATIENT
Start: 2017-02-19 | End: 2017-07-28

## 2017-02-19 NOTE — MR AVS SNAPSHOT
After Visit Summary   2/19/2017    Jailene Breen    MRN: [id number]           Patient Information     Date Of Birth          1965        Visit Information        Provider Department      2/19/2017 9:50 AM Andrey Riley MD [facility]        Today's Diagnoses     Panic attack    -  1    JEFF (generalized anxiety disorder)        Rheumatoid arthritis involving multiple sites, unspecified rheumatoid factor presence (H)          Care Instructions      Panic Attack  A panic attack is an extreme fear reaction that comes on for no clear reason. There is often a fear that something terrible will happen or that you may die. The attack may last a few minutes up to a few hours. Between attacks, things will seem quite normal. This condition has a psychological cause and can be treated with the help of a therapist or psychiatrist. Medicine can be very helpful for this problem.  Panic attacks usually come on suddenly, reaches a peak within minutes, and includes at least 4 of these symptoms:    Palpitations, pounding heart, or accelerated heart rate    Sweating    Crying    Trembling or shaking    Sensations of shortness of breath or smothering    Feelings of choking    Chest pain or discomfort    Nausea or abdominal distress    Feeling dizzy, unsteady, light-headed, or faint    Numbness or tingling sensations    Fear of dying    Fear of going crazy or of losing control    Feelings of unreality, strangeness, or detachment from the environment  Many of these symptoms can be linked to physical problems, so it is sometimes necessary to rule out conditions like thyroid disorders, heart disease, gastrointestinal problems, and others. They can also start as physical symptoms, but psychologically we may react to them in a fearful way, worsening the way we react and feel.  Home care    Try to find the sources of stress in your life. They may not be obvious. These may include:    Daily hassles of  life which pile up (traffic jams, missed appointments, car troubles, etc.).    Major life changes, both good (new baby, job promotion) and bad (loss of job, loss of loved one).    Feeling that you have too many responsibilities and can't take care of everything at once.    Helplessness: feeling like your problems are too much for you to handle.    Notice how your body reacts to stress. Learn to listen to your body signals so that you can take action before the stress becomes severe.    Try to be aware of what you were doing before the reaction started; this may give you clues to things that can trigger a reaction. It may be situations in your life, or what you were doing at the time.    When possible, avoid or reduce the cause of stress. Avoid hassles, limit the amount of change that is happening in your life at one time or take a break when you feel overloaded.    Unfortunately, many stressful situations cannot be avoided. Therefore, it is necessary to learn how to manage stress better. There are many proven methods that work and will reduce your anxiety. These include simple things like exercise, good nutrition and adequate rest. Also, there are certain techniques that are helpful: relaxation and breathing exercises, visualization, biofeedback, meditation or simply taking some time-out to clear your mind. For more information about this, ask your doctor or go to a local bookstore and review the many books and tapes available on this subject.  Follow-up care  Follow-up with your healthcare provider, or as advised.  Call 911  Call 911 if any of these occur:    Trouble breathing    Confusion    Very drowsy or trouble awakening    Fainting or loss of consciousness    Rapid heart rate    Seizure    New chest pain that becomes more severe, lasts longer, or spreads into your shoulder, arm, neck, jaw or back  When to seek medical advice  Call your healthcare provider right away if any of these occur:    Worsening of your  symptoms to the point of feeling out-of-control    Increased pain with breathing    Increasing feeling of weakness or dizziness    Cough with dark colored sputum (phlegm) or blood    Fever 1 degree above normal temperature ) lasting 24 to 48 hours or what your healthcare provider has advised    Swelling, pain or redness in one leg    Requests by family or friends for you to seek help for your symptoms    4035-5120 [employer]. [address] [place] [address]. All rights reserved. This information is not intended as a substitute for professional medical care. Always follow your healthcare professional's instructions.        Treating Panic Disorder (Panic Attack) with Therapy  If you have frequent panic attacks, you don t have to suffer anymore. Treatment is available. Therapy (also called counseling) is often a helpful treatment for panic disorder. With therapy, a specially trained professional (therapist) helps you face and learn to manage your anxiety. Therapy can be short-term or long-term depending on your needs. In some cases, medication may also be prescribed along with therapy. It may take time before you notice how much therapy is helping, but stick with it. With therapy, you can feel better.    Cognitive behavioral therapy (CBT)  Cognitive behavioral therapy (CBT) teaches you to manage anxiety that can lead to panic attacks. It does this by helping you understand how you think and act when you re anxious. Research has shown CBT to be a very effective treatment for panic disorder. How CBT is run is almost like a class. It involves homework and activities to build skills that teach you to cope with anxiety step by step. It can be done in a group or one-on-one, and often takes place for a set number of sessions. CBT has two main parts:    Cognitive therapy helps you identify the negative, irrational thoughts that occur with your anxiety. You ll learn to replace these with more positive,  realistic thoughts.    Behavioral therapy helps you change how you react to anxiety. You ll learn coping skills and methods for relaxing to help you better deal with anxiety.  Other forms of therapy  Other therapy methods may work better for you than CBT. Or, you may move from CBT to another form of therapy as your treatment needs change. This may mean meeting with a therapist by yourself or in a group. Therapy can also help you work through problems in your life, such as drug or alcohol dependence, that may be making your anxiety worse.  Getting better takes time  Therapy will help you feel better and teach you skills to help manage your panic disorder long term. But change doesn t happen right away. It takes a commitment from you. And treatment only works if you learn to face the causes of your anxiety. So, you might feel worse before you feel better. This can sometimes make it hard to stick with it. But remember: Therapy is a very effective treatment. The results will be well worth it.    0481-6864 [employer]. [address] [place] [address]. All rights reserved. This information is not intended as a substitute for professional medical care. Always follow your healthcare professional's instructions.        Patient Education    Clonazepam Oral disintegrating tablet    Clonazepam Oral tablet  Clonazepam Oral tablet  What is this medicine?  CLONAZEPAM (kloe NA ze benito) is a benzodiazepine. It is used to treat certain types of seizures. It is also used to treat panic disorder.  This medicine may be used for other purposes; ask your health care provider or pharmacist if you have questions.  What should I tell my health care provider before I take this medicine?  They need to know if you have any of these conditions:    an alcohol or drug abuse problem    bipolar disorder, depression, psychosis or other mental health condition    glaucoma    kidney or liver disease    lung or breathing  disease    myasthenia gravis    Parkinson's disease    seizures or a history of seizures    suicidal thoughts    an unusual or allergic reaction to clonazepam, other benzodiazepines, foods, dyes, or preservatives    pregnant or trying to get pregnant    breast-feeding  How should I use this medicine?  Take this medicine by mouth with a glass of water. Follow the directions on the prescription label. If it upsets your stomach, take it with food or milk. Take your medicine at regular intervals. Do not take it more often than directed. Do not stop taking or change the dose except on the advice of your doctor or health care professional.  A special MedGuide will be given to you by the pharmacist with each prescription and refill. Be sure to read this information carefully each time.  Talk to your pediatrician regarding the use of this medicine in children. Special care may be needed.  Overdosage: If you think you have taken too much of this medicine contact a poison control center or emergency room at once.  NOTE: This medicine is only for you. Do not share this medicine with others.  What if I miss a dose?  If you miss a dose, take it as soon as you can. If it is almost time for your next dose, take only that dose. Do not take double or extra doses.  What may interact with this medicine?    herbal or dietary supplements    medicines for depression, anxiety, or psychotic disturbances    medicines for fungal infections like fluconazole, itraconazole, ketoconazole, voriconazole    medicines for HIV infection or AIDS    medicines for sleep    prescription pain medicines    propantheline    rifampin    sevelamer    some medicines for seizures like carbamazepine, phenobarbital, phenytoin, primidone  This list may not describe all possible interactions. Give your health care provider a list of all the medicines, herbs, non-prescription drugs, or dietary supplements you use. Also tell them if you smoke, drink alcohol, or use  illegal drugs. Some items may interact with your medicine.  What should I watch for while using this medicine?  Visit your doctor or health care professional for regular checks on your progress. Your body may become dependent on this medicine. If you have been taking this medicine regularly for some time, do not suddenly stop taking it. You must gradually reduce the dose or you may get severe side effects. Ask your doctor or health care professional for advice before increasing or decreasing the dose. Even after you stop taking this medicine it can still affect your body for several days.  If you suffer from several types of seizures, this medicine may increase the chance of grand mal seizures (epilepsy). Let your doctor or health care professional know, he or she may want to prescribe an additional medicine.  You may get drowsy or dizzy. Do not drive, use machinery, or do anything that needs mental alertness until you know how this medicine affects you. To reduce the risk of dizzy and fainting spells, do not stand or sit up quickly, especially if you are an older patient. Alcohol may increase dizziness and drowsiness. Avoid alcoholic drinks.  Do not treat yourself for coughs, colds or allergies without asking your doctor or health care professional for advice. Some ingredients can increase possible side effects.  The use of this medicine may increase the chance of suicidal thoughts or actions. Pay special attention to how you are responding while on this medicine. Any worsening of mood, or thoughts of suicide or dying should be reported to your health care professional right away.  Women who become pregnant while using this medicine may enroll in the [place] Antiepileptic Drug Pregnancy Registry by calling 1-524.773.3696. This registry collects information about the safety of antiepileptic drug use during pregnancy.  What side effects may I notice from receiving this medicine?  Side effects that you should  report to your doctor or health care professional as soon as possible:    allergic reactions like skin rash, itching or hives, swelling of the face, lips, or tongue    changes in vision    confusion    depression    hallucinations    mood changes, excitability or aggressive behavior    movement difficulty, staggering or jerky movements    muscle cramps, weakness    tremors    unusual eye movements  Side effects that usually do not require medical attention (report to your doctor or health care professional if they continue or are bothersome):    constipation or diarrhea    difficulty sleeping, nightmares    dizziness, drowsiness    headache    increased saliva from your mouth    nausea, vomiting  This list may not describe all possible side effects. Call your doctor for medical advice about side effects. You may report side effects to FDA at 1-101-ZXD-0242.  Where should I keep my medicine?  Keep out of the reach of children. This medicine can be abused. Keep your medicine in a safe place to protect it from theft. Do not share this medicine with anyone. Selling or giving away this medicine is dangerous and against the law.  Store at room temperature between 15 and 30 degrees C (59 and 86 degrees F). Protect from light. Keep container tightly closed. Throw away any unused medicine after the expiration date.  NOTE:This sheet is a summary. It may not cover all possible information. If you have questions about this medicine, talk to your doctor, pharmacist, or health care provider. Copyright  2016 [employer]              Follow-ups after your visit        Your next 10 appointments already scheduled     Feb 21, 2017  3:00 PM CST   Office Visit with JASON Rice   [facility]    [address] [place] [id number] [phone]           Bring a current list of meds and any records pertaining to this visit.  For Physicals, please  bring immunization records and any forms needing to be filled out.  Please arrive 10 minutes early to complete paperwork.            Mar 31, 2017  9:00 AM CDT   New Visit with JUANITA Ga CNP   [facility] [place] ([facility])    [address]  [place] [address] [phone]              Who to contact     If you have questions or need follow up information about today's clinic visit or your schedule please contact [facility] directly at [phone].  Normal or non-critical lab and imaging results will be communicated to you by Banyanhart, letter or phone within 4 business days after the clinic has received the results. If you do not hear from us within 7 days, please contact the clinic through Banyanhart or phone. If you have a critical or abnormal lab result, we will notify you by phone as soon as possible.  Submit refill requests through Skaffl or call your pharmacy and they will forward the refill request to us. Please allow 3 business days for your refill to be completed.          Additional Information About Your Visit        MyChart Information     [employer] gives you secure access to your electronic health record. If you see a primary care provider, you can also send messages to your care team and make appointments. If you have questions, please call your primary care clinic.  If you do not have a primary care provider, please call [phone] and they will assist you.        Care EveryWhere ID     This is your Care EveryWhere ID. This could be used by other organizations to access your [facility] medical records  [id number]        Your Vitals Were     Pulse Temperature Pulse Oximetry BMI (Body Mass Index)          88 97.8  F (36.6  C) (Oral) 100% 25.81 kg/m2         Blood Pressure from Last 3 Encounters:   02/19/17 (!) 146/91   02/10/17 120/85   02/09/17 138/84    Weight from Last 3 Encounters:   02/19/17 212 lb (96.2 kg)   02/10/17 209  lb (94.8 kg)   02/09/17 212 lb 9.6 oz (96.4 kg)              Today, you had the following     No orders found for display         Today's Medication Changes          These changes are accurate as of: 2/19/17 10:26 AM.  If you have any questions, ask your nurse or doctor.               These medicines have changed or have updated prescriptions.        Dose/Directions    * clonazePAM 0.5 MG tablet   Commonly known as:  klonoPIN   This may have changed:  Another medication with the same name was added. Make sure you understand how and when to take each.   Used for:  Panic attack   Changed by:  Nay Simmons NP        Dose:  0.25 mg   Take 0.5 tablets (0.25 mg) by mouth 2 times daily as needed for anxiety   Quantity:  16 tablet   Refills:  0       * clonazePAM 1 MG tablet   Commonly known as:  klonoPIN   This may have changed:  You were already taking a medication with the same name, and this prescription was added. Make sure you understand how and when to take each.   Used for:  Panic attack   Changed by:  Andrey Riley MD        Dose:  0.5-1 mg   Take 0.5-1 tablets (0.5-1 mg) by mouth 2 times daily as needed for anxiety   Quantity:  4 tablet   Refills:  0       methylPREDNISolone 4 MG tablet   Commonly known as:  MEDROL DOSEPAK   This may have changed:    - when to take this  - reasons to take this  - additional instructions        Follow package instructions   Quantity:  21 tablet   Refills:  0       * Notice:  This list has 2 medication(s) that are the same as other medications prescribed for you. Read the directions carefully, and ask your doctor or other care provider to review them with you.         Where to get your medicines      Some of these will need a paper prescription and others can be bought over the counter.  Ask your nurse if you have questions.     Bring a paper prescription for each of these medications     clonazePAM 1 MG tablet                Primary Care Provider    [facility]  [place]       No address on file        Thank you!     Thank you for choosing [facility]  for your care. Our goal is always to provide you with excellent care. Hearing back from our patients is one way we can continue to improve our services. Please take a few minutes to complete the written survey that you may receive in the mail after your visit with us. Thank you!             Your Updated Medication List - Protect others around you: Learn how to safely use, store and throw away your medicines at www.disposemymeds.org.          This list is accurate as of: 2/19/17 10:26 AM.  Always use your most recent med list.                   Brand Name Dispense Instructions for use    * clonazePAM 0.5 MG tablet    klonoPIN    16 tablet    Take 0.5 tablets (0.25 mg) by mouth 2 times daily as needed for anxiety       * clonazePAM 1 MG tablet    klonoPIN    4 tablet    Take 0.5-1 tablets (0.5-1 mg) by mouth 2 times daily as needed for anxiety       cyclobenzaprine 5 MG tablet    FLEXERIL     Take 5 mg by mouth       DULoxetine 30 MG EC capsule    CYMBALTA    60 capsule    1 tab daily for 1 week then 1 tab BID       FLUoxetine 20 MG capsule    PROzac    90 capsule    Take 3 capsules (60 mg) by mouth daily       folic acid 1 MG tablet    FOLVITE         HUMIRA 40 MG/0.8ML prefilled syringe kit   Generic drug:  adalimumab      Inject 40 mg Subcutaneous       hydrOXYzine 25 MG tablet    ATARAX    90 tablet    Take 1-2 tablets (25-50 mg) by mouth every 6 hours as needed for anxiety       IBUPROFEN PO      Take 800 mg by mouth every 6 hours as needed for moderate pain       methotrexate 2.5 MG tablet CHEMO      6 tablets 6- tablets every Sunday       methylPREDNISolone 4 MG tablet    MEDROL DOSEPAK    21 tablet    Follow package instructions       * oxyCODONE 5 MG capsule    OXY-IR     Take 5 mg by mouth every 6 hours as needed for moderate to severe pain       * oxyCODONE 10 MG 12 hr tablet    OxyCONTIN    28  tablet    1 tab q 6 hours daily for 1 week       * oxyCODONE 10 MG 12 hr tablet    OxyCONTIN    21 tablet    1 tab 3 times daily for one week.       * oxyCODONE 10 MG 12 hr tablet    OxyCONTIN    7 tablet    Take 1 tablet (10 mg) by mouth daily       * oxyCODONE 10 MG IR tablet    ROXICODONE    28 tablet    Take 1 tablet (10 mg) by mouth every 6 hours as needed for moderate to severe pain       * oxyCODONE 10 MG IR tablet    ROXICODONE    21 tablet    Take 1 tablet (10 mg) by mouth every 8 hours as needed for moderate to severe pain       * oxyCODONE 10 MG 12 hr tablet   Start taking on:  2/24/2017    OxyCONTIN    14 tablet    Take 1 tablet (10 mg) by mouth every 12 hours as needed for moderate to severe pain maximum 2 tablet(s) per day       tamsulosin 0.4 MG capsule    FLOMAX    30 capsule    Take 1 capsule (0.4 mg) by mouth daily       * Notice:  This list has 9 medication(s) that are the same as other medications prescribed for you. Read the directions carefully, and ask your doctor or other care provider to review them with you.

## 2017-02-19 NOTE — PATIENT INSTRUCTIONS
Panic Attack  A panic attack is an extreme fear reaction that comes on for no clear reason. There is often a fear that something terrible will happen or that you may die. The attack may last a few minutes up to a few hours. Between attacks, things will seem quite normal. This condition has a psychological cause and can be treated with the help of a therapist or psychiatrist. Medicine can be very helpful for this problem.  Panic attacks usually come on suddenly, reaches a peak within minutes, and includes at least 4 of these symptoms:    Palpitations, pounding heart, or accelerated heart rate    Sweating    Crying    Trembling or shaking    Sensations of shortness of breath or smothering    Feelings of choking    Chest pain or discomfort    Nausea or abdominal distress    Feeling dizzy, unsteady, light-headed, or faint    Numbness or tingling sensations    Fear of dying    Fear of going crazy or of losing control    Feelings of unreality, strangeness, or detachment from the environment  Many of these symptoms can be linked to physical problems, so it is sometimes necessary to rule out conditions like thyroid disorders, heart disease, gastrointestinal problems, and others. They can also start as physical symptoms, but psychologically we may react to them in a fearful way, worsening the way we react and feel.  Home care    Try to find the sources of stress in your life. They may not be obvious. These may include:    Daily hassles of life which pile up (traffic jams, missed appointments, car troubles, etc.).    Major life changes, both good (new baby, job promotion) and bad (loss of job, loss of loved one).    Feeling that you have too many responsibilities and can't take care of everything at once.    Helplessness: feeling like your problems are too much for you to handle.    Notice how your body reacts to stress. Learn to listen to your body signals so that you can take action before the stress becomes  severe.    Try to be aware of what you were doing before the reaction started; this may give you clues to things that can trigger a reaction. It may be situations in your life, or what you were doing at the time.    When possible, avoid or reduce the cause of stress. Avoid hassles, limit the amount of change that is happening in your life at one time or take a break when you feel overloaded.    Unfortunately, many stressful situations cannot be avoided. Therefore, it is necessary to learn how to manage stress better. There are many proven methods that work and will reduce your anxiety. These include simple things like exercise, good nutrition and adequate rest. Also, there are certain techniques that are helpful: relaxation and breathing exercises, visualization, biofeedback, meditation or simply taking some time-out to clear your mind. For more information about this, ask your doctor or go to a local bookstore and review the many books and tapes available on this subject.  Follow-up care  Follow-up with your healthcare provider, or as advised.  Call 911  Call 911 if any of these occur:    Trouble breathing    Confusion    Very drowsy or trouble awakening    Fainting or loss of consciousness    Rapid heart rate    Seizure    New chest pain that becomes more severe, lasts longer, or spreads into your shoulder, arm, neck, jaw or back  When to seek medical advice  Call your healthcare provider right away if any of these occur:    Worsening of your symptoms to the point of feeling out-of-control    Increased pain with breathing    Increasing feeling of weakness or dizziness    Cough with dark colored sputum (phlegm) or blood    Fever 1 degree above normal temperature ) lasting 24 to 48 hours or what your healthcare provider has advised    Swelling, pain or redness in one leg    Requests by family or friends for you to seek help for your symptoms    [id number] [employer]. [address] [facility], [place]  [address]. All rights reserved. This information is not intended as a substitute for professional medical care. Always follow your healthcare professional's instructions.        Treating Panic Disorder (Panic Attack) with Therapy  If you have frequent panic attacks, you don t have to suffer anymore. Treatment is available. Therapy (also called counseling) is often a helpful treatment for panic disorder. With therapy, a specially trained professional (therapist) helps you face and learn to manage your anxiety. Therapy can be short-term or long-term depending on your needs. In some cases, medication may also be prescribed along with therapy. It may take time before you notice how much therapy is helping, but stick with it. With therapy, you can feel better.    Cognitive behavioral therapy (CBT)  Cognitive behavioral therapy (CBT) teaches you to manage anxiety that can lead to panic attacks. It does this by helping you understand how you think and act when you re anxious. Research has shown CBT to be a very effective treatment for panic disorder. How CBT is run is almost like a class. It involves homework and activities to build skills that teach you to cope with anxiety step by step. It can be done in a group or one-on-one, and often takes place for a set number of sessions. CBT has two main parts:    Cognitive therapy helps you identify the negative, irrational thoughts that occur with your anxiety. You ll learn to replace these with more positive, realistic thoughts.    Behavioral therapy helps you change how you react to anxiety. You ll learn coping skills and methods for relaxing to help you better deal with anxiety.  Other forms of therapy  Other therapy methods may work better for you than CBT. Or, you may move from CBT to another form of therapy as your treatment needs change. This may mean meeting with a therapist by yourself or in a group. Therapy can also help you work through problems in your life, such as drug  or alcohol dependence, that may be making your anxiety worse.  Getting better takes time  Therapy will help you feel better and teach you skills to help manage your panic disorder long term. But change doesn t happen right away. It takes a commitment from you. And treatment only works if you learn to face the causes of your anxiety. So, you might feel worse before you feel better. This can sometimes make it hard to stick with it. But remember: Therapy is a very effective treatment. The results will be well worth it.    [id number] [employer]. [address] [place], [place] [id number]. All rights reserved. This information is not intended as a substitute for professional medical care. Always follow your healthcare professional's instructions.        Patient Education    Clonazepam Oral disintegrating tablet    Clonazepam Oral tablet  Clonazepam Oral tablet  What is this medicine?  CLONAZEPAM (kloe NA ze benito) is a benzodiazepine. It is used to treat certain types of seizures. It is also used to treat panic disorder.  This medicine may be used for other purposes; ask your health care provider or pharmacist if you have questions.  What should I tell my health care provider before I take this medicine?  They need to know if you have any of these conditions:    an alcohol or drug abuse problem    bipolar disorder, depression, psychosis or other mental health condition    glaucoma    kidney or liver disease    lung or breathing disease    myasthenia gravis    Parkinson's disease    seizures or a history of seizures    suicidal thoughts    an unusual or allergic reaction to clonazepam, other benzodiazepines, foods, dyes, or preservatives    pregnant or trying to get pregnant    breast-feeding  How should I use this medicine?  Take this medicine by mouth with a glass of water. Follow the directions on the prescription label. If it upsets your stomach, take it with food or milk. Take your medicine at regular  intervals. Do not take it more often than directed. Do not stop taking or change the dose except on the advice of your doctor or health care professional.  A special MedGuide will be given to you by the pharmacist with each prescription and refill. Be sure to read this information carefully each time.  Talk to your pediatrician regarding the use of this medicine in children. Special care may be needed.  Overdosage: If you think you have taken too much of this medicine contact a poison control center or emergency room at once.  NOTE: This medicine is only for you. Do not share this medicine with others.  What if I miss a dose?  If you miss a dose, take it as soon as you can. If it is almost time for your next dose, take only that dose. Do not take double or extra doses.  What may interact with this medicine?    herbal or dietary supplements    medicines for depression, anxiety, or psychotic disturbances    medicines for fungal infections like fluconazole, itraconazole, ketoconazole, voriconazole    medicines for HIV infection or AIDS    medicines for sleep    prescription pain medicines    propantheline    rifampin    sevelamer    some medicines for seizures like carbamazepine, phenobarbital, phenytoin, primidone  This list may not describe all possible interactions. Give your health care provider a list of all the medicines, herbs, non-prescription drugs, or dietary supplements you use. Also tell them if you smoke, drink alcohol, or use illegal drugs. Some items may interact with your medicine.  What should I watch for while using this medicine?  Visit your doctor or health care professional for regular checks on your progress. Your body may become dependent on this medicine. If you have been taking this medicine regularly for some time, do not suddenly stop taking it. You must gradually reduce the dose or you may get severe side effects. Ask your doctor or health care professional for advice before increasing or  decreasing the dose. Even after you stop taking this medicine it can still affect your body for several days.  If you suffer from several types of seizures, this medicine may increase the chance of grand mal seizures (epilepsy). Let your doctor or health care professional know, he or she may want to prescribe an additional medicine.  You may get drowsy or dizzy. Do not drive, use machinery, or do anything that needs mental alertness until you know how this medicine affects you. To reduce the risk of dizzy and fainting spells, do not stand or sit up quickly, especially if you are an older patient. Alcohol may increase dizziness and drowsiness. Avoid alcoholic drinks.  Do not treat yourself for coughs, colds or allergies without asking your doctor or health care professional for advice. Some ingredients can increase possible side effects.  The use of this medicine may increase the chance of suicidal thoughts or actions. Pay special attention to how you are responding while on this medicine. Any worsening of mood, or thoughts of suicide or dying should be reported to your health care professional right away.  Women who become pregnant while using this medicine may enroll in the [place] Antiepileptic Drug Pregnancy Registry by calling 1-528.183.2235. This registry collects information about the safety of antiepileptic drug use during pregnancy.  What side effects may I notice from receiving this medicine?  Side effects that you should report to your doctor or health care professional as soon as possible:    allergic reactions like skin rash, itching or hives, swelling of the face, lips, or tongue    changes in vision    confusion    depression    hallucinations    mood changes, excitability or aggressive behavior    movement difficulty, staggering or jerky movements    muscle cramps, weakness    tremors    unusual eye movements  Side effects that usually do not require medical attention (report to your doctor or  health care professional if they continue or are bothersome):    constipation or diarrhea    difficulty sleeping, nightmares    dizziness, drowsiness    headache    increased saliva from your mouth    nausea, vomiting  This list may not describe all possible side effects. Call your doctor for medical advice about side effects. You may report side effects to FDA at 6-632-FDA-3751.  Where should I keep my medicine?  Keep out of the reach of children. This medicine can be abused. Keep your medicine in a safe place to protect it from theft. Do not share this medicine with anyone. Selling or giving away this medicine is dangerous and against the law.  Store at room temperature between 15 and 30 degrees C (59 and 86 degrees F). Protect from light. Keep container tightly closed. Throw away any unused medicine after the expiration date.  NOTE:This sheet is a summary. It may not cover all possible information. If you have questions about this medicine, talk to your doctor, pharmacist, or health care provider. Copyright  2016 [employer]

## 2017-02-19 NOTE — PROGRESS NOTES
SUBJECTIVE:                                                    Jailene Breen is a 51 year old male who presents to clinic today for the following health issues:      Pt c/o panic attacks that have been going on all week  Shakes, jittery, hard to take a deep breath, hot flashes, shakes   Xanax, and Cymbalta       He is known to have JEFF, panic attack and RA. He lives with wife, has 2 children and works as . He denies smoking cigarette or drinking alcohol. He was prescribed oxycodone for low back pain 2 days ago. He denies any suicidal or homicidal ideation. He is undergoing psychotherapy as well. He denies any fever, chills, cough, chest pain, palpitation or other relevant systemic symptoms.         Problem list and histories reviewed & adjusted, as indicated.  Additional history: as documented    Patient Active Problem List   Diagnosis     CARDIOVASCULAR SCREENING; LDL GOAL LESS THAN 160     Anxiety     Overweight (BMI 25.0-29.9)     Back pain     Tear meniscus knee, right, initial encounter     Rheumatoid arthritis involving multiple sites, unspecified rheumatoid factor presence (H)     Chronic pain     Right-sided thoracic back pain, unspecified chronicity     JEFF (generalized anxiety disorder)     Panic attack     Past Surgical History   Procedure Laterality Date     Orthopedic surgery       Rt knee X 2     Orthopedic surgery       Lt foot     Arthroscopy knee Right 9/22/2016     Procedure: ARTHROSCOPY KNEE;  Surgeon: Fredo Hughes MD;  Location: [facility] OR     Inject paravertebral facet joint lumbar / sacral first Right 11/25/2016     Procedure: INJECT PARAVERTEBRAL FACET JOINT LUMBAR / SACRAL FIRST;  Surgeon: Doretha Magallanes MD;  Location: [facility] OR       Social History   Substance Use Topics     Smoking status: Never Smoker     Smokeless tobacco: Current User     Types: Chew      Comment: Chew     Alcohol use No      Comment: none     No family history on file.      Current Outpatient  Prescriptions   Medication Sig Dispense Refill     cyclobenzaprine (FLEXERIL) 5 MG tablet Take 5 mg by mouth       oxyCODONE (ROXICODONE) 10 MG IR tablet Take 1 tablet (10 mg) by mouth every 8 hours as needed for moderate to severe pain 21 tablet 0     tamsulosin (FLOMAX) 0.4 MG capsule Take 1 capsule (0.4 mg) by mouth daily 30 capsule 3     DULoxetine (CYMBALTA) 30 MG EC capsule 1 tab daily for 1 week then 1 tab BID 60 capsule 3     oxyCODONE (OXYCONTIN) 10 MG 12 hr tablet 1 tab q 6 hours daily for 1 week 28 tablet 0     oxyCODONE (OXY-IR) 5 MG capsule Take 5 mg by mouth every 6 hours as needed for moderate to severe pain       hydrOXYzine (ATARAX) 25 MG tablet Take 1-2 tablets (25-50 mg) by mouth every 6 hours as needed for anxiety 90 tablet 3     methylPREDNISolone (MEDROL DOSEPAK) 4 MG tablet Follow package instructions (Patient taking differently: as needed Follow package instructions) 21 tablet 0     methotrexate 2.5 MG tablet 6 tablets 6- tablets every Sunday       adalimumab (HUMIRA) 40 MG/0.8ML prefilled syringe kit Inject 40 mg Subcutaneous       folic acid (FOLVITE) 1 MG tablet        IBUPROFEN PO Take 800 mg by mouth every 6 hours as needed for moderate pain       oxyCODONE (ROXICODONE) 10 MG IR tablet Take 1 tablet (10 mg) by mouth every 6 hours as needed for moderate to severe pain (Patient not taking: Reported on 2/19/2017) 28 tablet 0     oxyCODONE (OXYCONTIN) 10 MG 12 hr tablet 1 tab 3 times daily for one week. (Patient not taking: Reported on 2/19/2017) 21 tablet 0     [START ON 2/24/2017] oxyCODONE (OXYCONTIN) 10 MG 12 hr tablet Take 1 tablet (10 mg) by mouth every 12 hours as needed for moderate to severe pain maximum 2 tablet(s) per day (Patient not taking: Reported on 2/19/2017) 14 tablet 0     oxyCODONE (OXYCONTIN) 10 MG 12 hr tablet Take 1 tablet (10 mg) by mouth daily (Patient not taking: Reported on 2/19/2017) 7 tablet 0     clonazePAM (KLONOPIN) 0.5 MG tablet Take 0.5 tablets (0.25 mg) by  "mouth 2 times daily as needed for anxiety 16 tablet 0     FLUoxetine (PROZAC) 20 MG capsule Take 3 capsules (60 mg) by mouth daily 90 capsule 3     Allergies   Allergen Reactions     Hydrocodone Itching     Remeron Soltab Other (See Comments)     \"Blacked out\" for one week     Recent Labs   Lab Test  10/07/16   0948  09/30/16   1336  04/21/16   1520   ALT  25   --    --    CR  0.88   --   1.01   GFRESTIMATED  >90  Non  GFR Calc     --   79   GFRESTBLACK  >90   GFR Calc     --   >90   GFR Calc     POTASSIUM  3.9   --   4.1   TSH   --   1.62   --       BP Readings from Last 3 Encounters:   02/19/17 (!) 146/91   02/10/17 120/85   02/09/17 138/84    Wt Readings from Last 3 Encounters:   02/19/17 212 lb (96.2 kg)   02/10/17 209 lb (94.8 kg)   02/09/17 212 lb 9.6 oz (96.4 kg)                  Labs reviewed in EPIC  Problem list, Medication list, Allergies, and Medical/Social/Surgical histories reviewed in [facility] and updated as appropriate.    ROS:  Constitutional, HEENT, cardiovascular, pulmonary, gi and gu systems are negative, except as otherwise noted.    OBJECTIVE:                                                    BP (!) 146/91 (BP Location: Left arm, Patient Position: Chair, Cuff Size: Adult Large)  Pulse 88  Temp 97.8  F (36.6  C) (Oral)  Wt 212 lb (96.2 kg)  SpO2 100%  BMI 25.81 kg/m2  Body mass index is 25.81 kg/(m^2).  GENERAL: alert and anxious  NECK: no adenopathy, no asymmetry, masses, or scars and thyroid normal to palpation  RESP: lungs clear to auscultation - no rales, rhonchi or wheezes  CV: regular rate and rhythm, normal S1 S2, no S3 or S4, no murmur, click or rub, no peripheral edema and peripheral pulses strong  ABDOMEN: soft, nontender, no hepatosplenomegaly, no masses and bowel sounds normal  MS: no gross musculoskeletal defects noted, no edema  NEURO: Normal strength and tone, mentation intact and speech normal, reflexes, PERRLA, EOM intact  PSYCH: " anxious, jittery, hyper with pressured speech, good eye contact, no suicidal or homicidal ideation       ASSESSMENT/PLAN:                                                          ICD-10-CM    1. Panic attack F41.0 clonazePAM (KLONOPIN) 1 MG tablet   2. JEFF (generalized anxiety disorder) F41.1    3. Rheumatoid arthritis involving multiple sites, unspecified rheumatoid factor presence (H) M06.9        51 year old male presents with nervousness, restlessness, feels shaky and clammy intermittently for about 1 week. Past medical history is significant for JEFF, panic attacks and RA. He is taking oral opioids for chronic back pain and Cymbalta for anxiety. He is following psychotherapy as well. Symptoms are still not well-controlled. Denies any homicidal or suicidal ideation. Clonazepam prescribed, only 4 tabs and suggested to follow up with PCP for considering psychiatrist referral, common side effects explained including sedation. He is taking  MTX and humira for RA, rheumatological symptoms are well controlled currently. Other medications reviewed and no changes made. Patient understood and in agreement with the above plan. All questions are answered. Follow-up when necessary.      MEDICATIONS:   Orders Placed This Encounter   Medications     cyclobenzaprine (FLEXERIL) 5 MG tablet     Sig: Take 5 mg by mouth     clonazePAM (KLONOPIN) 1 MG tablet     Sig: Take 0.5-1 tablets (0.5-1 mg) by mouth 2 times daily as needed for anxiety     Dispense:  4 tablet     Refill:  0     Patient Instructions     Panic Attack  A panic attack is an extreme fear reaction that comes on for no clear reason. There is often a fear that something terrible will happen or that you may die. The attack may last a few minutes up to a few hours. Between attacks, things will seem quite normal. This condition has a psychological cause and can be treated with the help of a therapist or psychiatrist. Medicine can be very helpful for this problem.  Panic  attacks usually come on suddenly, reaches a peak within minutes, and includes at least 4 of these symptoms:    Palpitations, pounding heart, or accelerated heart rate    Sweating    Crying    Trembling or shaking    Sensations of shortness of breath or smothering    Feelings of choking    Chest pain or discomfort    Nausea or abdominal distress    Feeling dizzy, unsteady, light-headed, or faint    Numbness or tingling sensations    Fear of dying    Fear of going crazy or of losing control    Feelings of unreality, strangeness, or detachment from the environment  Many of these symptoms can be linked to physical problems, so it is sometimes necessary to rule out conditions like thyroid disorders, heart disease, gastrointestinal problems, and others. They can also start as physical symptoms, but psychologically we may react to them in a fearful way, worsening the way we react and feel.  Home care    Try to find the sources of stress in your life. They may not be obvious. These may include:    Daily hassles of life which pile up (traffic jams, missed appointments, car troubles, etc.).    Major life changes, both good (new baby, job promotion) and bad (loss of job, loss of loved one).    Feeling that you have too many responsibilities and can't take care of everything at once.    Helplessness: feeling like your problems are too much for you to handle.    Notice how your body reacts to stress. Learn to listen to your body signals so that you can take action before the stress becomes severe.    Try to be aware of what you were doing before the reaction started; this may give you clues to things that can trigger a reaction. It may be situations in your life, or what you were doing at the time.    When possible, avoid or reduce the cause of stress. Avoid hassles, limit the amount of change that is happening in your life at one time or take a break when you feel overloaded.    Unfortunately, many stressful situations cannot  be avoided. Therefore, it is necessary to learn how to manage stress better. There are many proven methods that work and will reduce your anxiety. These include simple things like exercise, good nutrition and adequate rest. Also, there are certain techniques that are helpful: relaxation and breathing exercises, visualization, biofeedback, meditation or simply taking some time-out to clear your mind. For more information about this, ask your doctor or go to a local bookstore and review the many books and tapes available on this subject.  Follow-up care  Follow-up with your healthcare provider, or as advised.  Call 911  Call 911 if any of these occur:    Trouble breathing    Confusion    Very drowsy or trouble awakening    Fainting or loss of consciousness    Rapid heart rate    Seizure    New chest pain that becomes more severe, lasts longer, or spreads into your shoulder, arm, neck, jaw or back  When to seek medical advice  Call your healthcare provider right away if any of these occur:    Worsening of your symptoms to the point of feeling out-of-control    Increased pain with breathing    Increasing feeling of weakness or dizziness    Cough with dark colored sputum (phlegm) or blood    Fever 1 degree above normal temperature ) lasting 24 to 48 hours or what your healthcare provider has advised    Swelling, pain or redness in one leg    Requests by family or friends for you to seek help for your symptoms    4823-0909 [employer]. [address] [place] [address], [place] [address]. All rights reserved. This information is not intended as a substitute for professional medical care. Always follow your healthcare professional's instructions.        Treating Panic Disorder (Panic Attack) with Therapy  If you have frequent panic attacks, you don t have to suffer anymore. Treatment is available. Therapy (also called counseling) is often a helpful treatment for panic disorder. With therapy, a specially trained  professional (therapist) helps you face and learn to manage your anxiety. Therapy can be short-term or long-term depending on your needs. In some cases, medication may also be prescribed along with therapy. It may take time before you notice how much therapy is helping, but stick with it. With therapy, you can feel better.    Cognitive behavioral therapy (CBT)  Cognitive behavioral therapy (CBT) teaches you to manage anxiety that can lead to panic attacks. It does this by helping you understand how you think and act when you re anxious. Research has shown CBT to be a very effective treatment for panic disorder. How CBT is run is almost like a class. It involves homework and activities to build skills that teach you to cope with anxiety step by step. It can be done in a group or one-on-one, and often takes place for a set number of sessions. CBT has two main parts:    Cognitive therapy helps you identify the negative, irrational thoughts that occur with your anxiety. You ll learn to replace these with more positive, realistic thoughts.    Behavioral therapy helps you change how you react to anxiety. You ll learn coping skills and methods for relaxing to help you better deal with anxiety.  Other forms of therapy  Other therapy methods may work better for you than CBT. Or, you may move from CBT to another form of therapy as your treatment needs change. This may mean meeting with a therapist by yourself or in a group. Therapy can also help you work through problems in your life, such as drug or alcohol dependence, that may be making your anxiety worse.  Getting better takes time  Therapy will help you feel better and teach you skills to help manage your panic disorder long term. But change doesn t happen right away. It takes a commitment from you. And treatment only works if you learn to face the causes of your anxiety. So, you might feel worse before you feel better. This can sometimes make it hard to stick with it.  But remember: Therapy is a very effective treatment. The results will be well worth it.    7701-0781 [employer]. [address] [place] [address], [place] [address]. All rights reserved. This information is not intended as a substitute for professional medical care. Always follow your healthcare professional's instructions.        Patient Education    Clonazepam Oral disintegrating tablet    Clonazepam Oral tablet  Clonazepam Oral tablet  What is this medicine?  CLONAZEPAM (kloe NA ze benito) is a benzodiazepine. It is used to treat certain types of seizures. It is also used to treat panic disorder.  This medicine may be used for other purposes; ask your health care provider or pharmacist if you have questions.  What should I tell my health care provider before I take this medicine?  They need to know if you have any of these conditions:    an alcohol or drug abuse problem    bipolar disorder, depression, psychosis or other mental health condition    glaucoma    kidney or liver disease    lung or breathing disease    myasthenia gravis    Parkinson's disease    seizures or a history of seizures    suicidal thoughts    an unusual or allergic reaction to clonazepam, other benzodiazepines, foods, dyes, or preservatives    pregnant or trying to get pregnant    breast-feeding  How should I use this medicine?  Take this medicine by mouth with a glass of water. Follow the directions on the prescription label. If it upsets your stomach, take it with food or milk. Take your medicine at regular intervals. Do not take it more often than directed. Do not stop taking or change the dose except on the advice of your doctor or health care professional.  A special MedGuide will be given to you by the pharmacist with each prescription and refill. Be sure to read this information carefully each time.  Talk to your pediatrician regarding the use of this medicine in children. Special care may be needed.  Overdosage: If you think you  have taken too much of this medicine contact a poison control center or emergency room at once.  NOTE: This medicine is only for you. Do not share this medicine with others.  What if I miss a dose?  If you miss a dose, take it as soon as you can. If it is almost time for your next dose, take only that dose. Do not take double or extra doses.  What may interact with this medicine?    herbal or dietary supplements    medicines for depression, anxiety, or psychotic disturbances    medicines for fungal infections like fluconazole, itraconazole, ketoconazole, voriconazole    medicines for HIV infection or AIDS    medicines for sleep    prescription pain medicines    propantheline    rifampin    sevelamer    some medicines for seizures like carbamazepine, phenobarbital, phenytoin, primidone  This list may not describe all possible interactions. Give your health care provider a list of all the medicines, herbs, non-prescription drugs, or dietary supplements you use. Also tell them if you smoke, drink alcohol, or use illegal drugs. Some items may interact with your medicine.  What should I watch for while using this medicine?  Visit your doctor or health care professional for regular checks on your progress. Your body may become dependent on this medicine. If you have been taking this medicine regularly for some time, do not suddenly stop taking it. You must gradually reduce the dose or you may get severe side effects. Ask your doctor or health care professional for advice before increasing or decreasing the dose. Even after you stop taking this medicine it can still affect your body for several days.  If you suffer from several types of seizures, this medicine may increase the chance of grand mal seizures (epilepsy). Let your doctor or health care professional know, he or she may want to prescribe an additional medicine.  You may get drowsy or dizzy. Do not drive, use machinery, or do anything that needs mental alertness  until you know how this medicine affects you. To reduce the risk of dizzy and fainting spells, do not stand or sit up quickly, especially if you are an older patient. Alcohol may increase dizziness and drowsiness. Avoid alcoholic drinks.  Do not treat yourself for coughs, colds or allergies without asking your doctor or health care professional for advice. Some ingredients can increase possible side effects.  The use of this medicine may increase the chance of suicidal thoughts or actions. Pay special attention to how you are responding while on this medicine. Any worsening of mood, or thoughts of suicide or dying should be reported to your health care professional right away.  Women who become pregnant while using this medicine may enroll in the [place] Antiepileptic Drug Pregnancy Registry by calling 1-321.452.3633. This registry collects information about the safety of antiepileptic drug use during pregnancy.  What side effects may I notice from receiving this medicine?  Side effects that you should report to your doctor or health care professional as soon as possible:    allergic reactions like skin rash, itching or hives, swelling of the face, lips, or tongue    changes in vision    confusion    depression    hallucinations    mood changes, excitability or aggressive behavior    movement difficulty, staggering or jerky movements    muscle cramps, weakness    tremors    unusual eye movements  Side effects that usually do not require medical attention (report to your doctor or health care professional if they continue or are bothersome):    constipation or diarrhea    difficulty sleeping, nightmares    dizziness, drowsiness    headache    increased saliva from your mouth    nausea, vomiting  This list may not describe all possible side effects. Call your doctor for medical advice about side effects. You may report side effects to FDA at 6-875-CXG-1305.  Where should I keep my medicine?  Keep out of the  reach of children. This medicine can be abused. Keep your medicine in a safe place to protect it from theft. Do not share this medicine with anyone. Selling or giving away this medicine is dangerous and against the law.  Store at room temperature between 15 and 30 degrees C (59 and 86 degrees F). Protect from light. Keep container tightly closed. Throw away any unused medicine after the expiration date.  NOTE:This sheet is a summary. It may not cover all possible information. If you have questions about this medicine, talk to your doctor, pharmacist, or health care provider. Copyright  2016 [employer]            Andrey Riley MD  [facility]

## 2017-02-19 NOTE — NURSING NOTE
"Chief Complaint   Patient presents with     Anxiety       Initial BP (!) 146/91 (BP Location: Left arm, Patient Position: Chair, Cuff Size: Adult Large)  Pulse 88  Temp 97.8  F (36.6  C) (Oral)  Wt 212 lb (96.2 kg)  SpO2 100%  BMI 25.81 kg/m2 Estimated body mass index is 25.81 kg/(m^2) as calculated from the following:    Height as of 1/13/17: 6' 4\" (1.93 m).    Weight as of this encounter: 212 lb (96.2 kg).  Medication Reconciliation: complete     Cindy Gerber MA    "

## 2017-02-21 ENCOUNTER — APPOINTMENT (OUTPATIENT)
Dept: GENERAL RADIOLOGY | Facility: CLINIC | Age: 52
End: 2017-02-21
Attending: EMERGENCY MEDICINE
Payer: COMMERCIAL

## 2017-02-21 ENCOUNTER — OFFICE VISIT (OUTPATIENT)
Dept: FAMILY MEDICINE | Facility: CLINIC | Age: 52
End: 2017-02-21
Payer: COMMERCIAL

## 2017-02-21 ENCOUNTER — APPOINTMENT (OUTPATIENT)
Dept: CT IMAGING | Facility: CLINIC | Age: 52
End: 2017-02-21
Attending: EMERGENCY MEDICINE
Payer: COMMERCIAL

## 2017-02-21 ENCOUNTER — HOSPITAL ENCOUNTER (EMERGENCY)
Facility: CLINIC | Age: 52
Discharge: HOME | End: 2017-02-21
Attending: EMERGENCY MEDICINE | Admitting: EMERGENCY MEDICINE
Payer: COMMERCIAL

## 2017-02-21 ENCOUNTER — TELEPHONE (OUTPATIENT)
Dept: FAMILY MEDICINE | Facility: CLINIC | Age: 52
End: 2017-02-21

## 2017-02-21 VITALS
DIASTOLIC BLOOD PRESSURE: 80 MMHG | HEART RATE: 131 BPM | RESPIRATION RATE: 12 BRPM | SYSTOLIC BLOOD PRESSURE: 130 MMHG | TEMPERATURE: 98.7 F | OXYGEN SATURATION: 100 %

## 2017-02-21 VITALS
OXYGEN SATURATION: 96 % | DIASTOLIC BLOOD PRESSURE: 79 MMHG | TEMPERATURE: 98 F | SYSTOLIC BLOOD PRESSURE: 137 MMHG | RESPIRATION RATE: 20 BRPM

## 2017-02-21 DIAGNOSIS — M54.50 CHRONIC RIGHT-SIDED LOW BACK PAIN WITHOUT SCIATICA: ICD-10-CM

## 2017-02-21 DIAGNOSIS — S60.221A CONTUSION OF RIGHT HAND, INITIAL ENCOUNTER: ICD-10-CM

## 2017-02-21 DIAGNOSIS — G89.29 OTHER CHRONIC PAIN: ICD-10-CM

## 2017-02-21 DIAGNOSIS — R55 SYNCOPE, UNSPECIFIED SYNCOPE TYPE: ICD-10-CM

## 2017-02-21 DIAGNOSIS — R55 SYNCOPE, UNSPECIFIED SYNCOPE TYPE: Primary | ICD-10-CM

## 2017-02-21 DIAGNOSIS — G89.29 CHRONIC RIGHT-SIDED LOW BACK PAIN WITHOUT SCIATICA: ICD-10-CM

## 2017-02-21 DIAGNOSIS — S80.01XA CONTUSION OF RIGHT KNEE, INITIAL ENCOUNTER: ICD-10-CM

## 2017-02-21 DIAGNOSIS — F41.9 ANXIETY: ICD-10-CM

## 2017-02-21 LAB
ANION GAP SERPL CALCULATED.3IONS-SCNC: 12 MMOL/L (ref 3–14)
BASOPHILS # BLD AUTO: 0.1 10E9/L (ref 0–0.2)
BASOPHILS NFR BLD AUTO: 0.5 %
BUN SERPL-MCNC: 11 MG/DL (ref 7–30)
CALCIUM SERPL-MCNC: 8.8 MG/DL (ref 8.5–10.1)
CHLORIDE SERPL-SCNC: 106 MMOL/L (ref 94–109)
CO2 SERPL-SCNC: 24 MMOL/L (ref 20–32)
CREAT SERPL-MCNC: 0.92 MG/DL (ref 0.66–1.25)
DIFFERENTIAL METHOD BLD: NORMAL
EOSINOPHIL # BLD AUTO: 0.1 10E9/L (ref 0–0.7)
EOSINOPHIL NFR BLD AUTO: 1.2 %
ERYTHROCYTE [DISTWIDTH] IN BLOOD BY AUTOMATED COUNT: 12.7 % (ref 10–15)
GFR SERPL CREATININE-BSD FRML MDRD: 87 ML/MIN/1.7M2
GLUCOSE SERPL-MCNC: 114 MG/DL (ref 70–99)
HCT VFR BLD AUTO: 45.3 % (ref 40–53)
HGB BLD-MCNC: 15.8 G/DL (ref 13.3–17.7)
IMM GRANULOCYTES # BLD: 0 10E9/L (ref 0–0.4)
IMM GRANULOCYTES NFR BLD: 0.3 %
INTERPRETATION ECG - MUSE: NORMAL
LYMPHOCYTES # BLD AUTO: 1.5 10E9/L (ref 0.8–5.3)
LYMPHOCYTES NFR BLD AUTO: 15.4 %
MCH RBC QN AUTO: 30.6 PG (ref 26.5–33)
MCHC RBC AUTO-ENTMCNC: 34.9 G/DL (ref 31.5–36.5)
MCV RBC AUTO: 88 FL (ref 78–100)
MONOCYTES # BLD AUTO: 0.6 10E9/L (ref 0–1.3)
MONOCYTES NFR BLD AUTO: 5.8 %
NEUTROPHILS # BLD AUTO: 7.5 10E9/L (ref 1.6–8.3)
NEUTROPHILS NFR BLD AUTO: 76.8 %
NRBC # BLD AUTO: 0 10*3/UL
NRBC BLD AUTO-RTO: 0 /100
PLATELET # BLD AUTO: 254 10E9/L (ref 150–450)
POTASSIUM SERPL-SCNC: 3.8 MMOL/L (ref 3.4–5.3)
RBC # BLD AUTO: 5.17 10E12/L (ref 4.4–5.9)
SODIUM SERPL-SCNC: 142 MMOL/L (ref 133–144)
TROPONIN I SERPL-MCNC: NORMAL UG/L (ref 0–0.04)
WBC # BLD AUTO: 9.8 10E9/L (ref 4–11)

## 2017-02-21 PROCEDURE — 93005 ELECTROCARDIOGRAM TRACING: CPT

## 2017-02-21 PROCEDURE — 71010 XR CHEST 1 VW: CPT

## 2017-02-21 PROCEDURE — 84484 ASSAY OF TROPONIN QUANT: CPT | Performed by: EMERGENCY MEDICINE

## 2017-02-21 PROCEDURE — 80048 BASIC METABOLIC PNL TOTAL CA: CPT | Performed by: EMERGENCY MEDICINE

## 2017-02-21 PROCEDURE — 96374 THER/PROPH/DIAG INJ IV PUSH: CPT

## 2017-02-21 PROCEDURE — 99213 OFFICE O/P EST LOW 20 MIN: CPT | Performed by: PHYSICIAN ASSISTANT

## 2017-02-21 PROCEDURE — 85025 COMPLETE CBC W/AUTO DIFF WBC: CPT | Performed by: EMERGENCY MEDICINE

## 2017-02-21 PROCEDURE — 96375 TX/PRO/DX INJ NEW DRUG ADDON: CPT

## 2017-02-21 PROCEDURE — 96361 HYDRATE IV INFUSION ADD-ON: CPT

## 2017-02-21 PROCEDURE — 72125 CT NECK SPINE W/O DYE: CPT

## 2017-02-21 PROCEDURE — 25000128 H RX IP 250 OP 636: Performed by: EMERGENCY MEDICINE

## 2017-02-21 PROCEDURE — 25000132 ZZH RX MED GY IP 250 OP 250 PS 637: Performed by: EMERGENCY MEDICINE

## 2017-02-21 PROCEDURE — 96376 TX/PRO/DX INJ SAME DRUG ADON: CPT

## 2017-02-21 PROCEDURE — 72100 X-RAY EXAM L-S SPINE 2/3 VWS: CPT

## 2017-02-21 PROCEDURE — 99285 EMERGENCY DEPT VISIT HI MDM: CPT | Mod: 25

## 2017-02-21 PROCEDURE — 73130 X-RAY EXAM OF HAND: CPT | Mod: RT

## 2017-02-21 PROCEDURE — 70450 CT HEAD/BRAIN W/O DYE: CPT

## 2017-02-21 RX ORDER — LIDOCAINE 40 MG/G
CREAM TOPICAL
Status: DISCONTINUED | OUTPATIENT
Start: 2017-02-21 | End: 2017-02-21 | Stop reason: HOSPADM

## 2017-02-21 RX ORDER — DIAZEPAM 10 MG/2ML
2.5 INJECTION, SOLUTION INTRAMUSCULAR; INTRAVENOUS ONCE
Status: COMPLETED | OUTPATIENT
Start: 2017-02-21 | End: 2017-02-21

## 2017-02-21 RX ORDER — ACETAMINOPHEN 325 MG/1
650 TABLET ORAL ONCE
Status: COMPLETED | OUTPATIENT
Start: 2017-02-21 | End: 2017-02-21

## 2017-02-21 RX ORDER — OXYCODONE HYDROCHLORIDE 5 MG/1
10 TABLET ORAL ONCE
Status: COMPLETED | OUTPATIENT
Start: 2017-02-21 | End: 2017-02-21

## 2017-02-21 RX ORDER — SODIUM CHLORIDE 9 MG/ML
1000 INJECTION, SOLUTION INTRAVENOUS CONTINUOUS
Status: DISCONTINUED | OUTPATIENT
Start: 2017-02-21 | End: 2017-02-21 | Stop reason: HOSPADM

## 2017-02-21 RX ORDER — MORPHINE SULFATE 4 MG/ML
4 INJECTION, SOLUTION INTRAMUSCULAR; INTRAVENOUS ONCE
Status: COMPLETED | OUTPATIENT
Start: 2017-02-21 | End: 2017-02-21

## 2017-02-21 RX ORDER — OXYCODONE HYDROCHLORIDE 5 MG/1
5 TABLET ORAL 2 TIMES DAILY
Qty: 28 TABLET | Refills: 0 | Status: SHIPPED | OUTPATIENT
Start: 2017-03-07 | End: 2020-02-12

## 2017-02-21 RX ORDER — OXYCODONE HYDROCHLORIDE 10 MG/1
10 TABLET ORAL 2 TIMES DAILY
Qty: 28 TABLET | Refills: 0 | Status: SHIPPED | OUTPATIENT
Start: 2017-02-21 | End: 2017-03-07

## 2017-02-21 RX ADMIN — OXYCODONE HYDROCHLORIDE 10 MG: 5 TABLET ORAL at 08:52

## 2017-02-21 RX ADMIN — SODIUM CHLORIDE 1000 ML: 9 INJECTION, SOLUTION INTRAVENOUS at 08:45

## 2017-02-21 RX ADMIN — ACETAMINOPHEN 650 MG: 325 TABLET, FILM COATED ORAL at 08:53

## 2017-02-21 RX ADMIN — MORPHINE SULFATE 4 MG: 4 INJECTION, SOLUTION INTRAMUSCULAR; INTRAVENOUS at 11:30

## 2017-02-21 RX ADMIN — DIAZEPAM 2.5 MG: 5 INJECTION, SOLUTION INTRAMUSCULAR; INTRAVENOUS at 08:52

## 2017-02-21 RX ADMIN — MORPHINE SULFATE 4 MG: 4 INJECTION, SOLUTION INTRAMUSCULAR; INTRAVENOUS at 10:24

## 2017-02-21 ASSESSMENT — ENCOUNTER SYMPTOMS
LIGHT-HEADEDNESS: 1
NECK PAIN: 1
BACK PAIN: 1
HEADACHES: 0
TREMORS: 1

## 2017-02-21 NOTE — TELEPHONE ENCOUNTER
Patient needs to be seen . It appears he was seen. As I explained to the patient many time I do not treat chronic pain. He needs to see psychiatrist as was discussed before since his anxiety is hard to treat patient was given referral to see pain clinic and psych many times before.   Katarina Haywood PAC

## 2017-02-21 NOTE — TELEPHONE ENCOUNTER
Patient is calling in to change appointment to an earlier time. Patient reached for the door knob today and passed out. He hurt is elbow and back and is now having pain. He is shaky and sitting in his car. He feels like it is from the cymbalta. He states that he has had moments were he has had headrushes but never passed out. Patient states he is having no other symptoms. Negative for chest pain.    Patient is rescheduled for 9 am today.    Ida Hirsch RN, [facility] Triage

## 2017-02-21 NOTE — TELEPHONE ENCOUNTER
[facility] [place], [phone] called regarding patient's Rx for oxyCODONE (ROXICODONE) 10 MG IR tablet    Patient is at Pharmacy and trying to fill Rx to soon.  He is stating he will pay cash.    Please call Pharmacy to advise.    Thank you,    Liliya Doe

## 2017-02-21 NOTE — ED AVS SNAPSHOT
[facility] Department    [address]    [facility] [address]    Phone:  [phone]    Fax:  [phone]                                       Jailene Breen   MRN: [id number]    Department:  [facility] Emergency Department   Date of Visit:  2/21/2017           After Visit Summary Signature Page     I have received my discharge instructions, and my questions have been answered. I have discussed any challenges I see with this plan with the nurse or doctor.    ..........................................................................................................................................  Patient/Patient Representative Signature      ..........................................................................................................................................  Patient Representative Print Name and Relationship to Patient    ..................................................               ................................................  Date                                            Time    ..........................................................................................................................................  Reviewed by Signature/Title    ...................................................              ..............................................  Date                                                            Time

## 2017-02-21 NOTE — MR AVS SNAPSHOT
After Visit Summary   2/21/2017    Jailene Breen    MRN: [id number]           Patient Information     Date Of Birth          1965        Visit Information        Provider Department      2/21/2017 1:40 PM Aiden Resendez PA [facility]        Today's Diagnoses     Syncope, unspecified syncope type    -  1    Other chronic pain        Anxiety           Follow-ups after your visit        Additional Services     CARDIOLOGY EVAL ADULT REFERRAL       Your provider has referred you to:  [facility]: [facility] [place] [place] [phone]   [email or web address]  [facility]: [facility] [phone]   [email or web address]    Please be aware that coverage of these services is subject to the terms and limitations of your health insurance plan.  Call member services at your health plan with any benefit or coverage questions.      Type of Referral:  New Cardiology Consult    Timeframe requested:  prn    Please bring the following to your appointment:  >>   Any x-rays, CTs or MRIs which have been performed.  Contact the facility where they were done to arrange for  prior to your scheduled appointment.  Any new CT, MRI or other procedures ordered by your specialist must be performed at a [place] facility or coordinated by your clinic's referral office.   >>   List of current medications  >>   This referral request   >>   Any documents/labs given to you for this referral                  Your next 10 appointments already scheduled     Mar 31, 2017  9:00 AM CDT   New Visit with JUANITA Ga CNP   [facility] [place] ([facility] [place])    [address]  [place] [id number] [phone]              Who to contact     If you have questions or need follow up  information about today's clinic visit or your schedule please contact [facility] directly at [phone].  Normal or non-critical lab and imaging results will be communicated to you by [employer], letter or phone within 4 business days after the clinic has received the results. If you do not hear from us within 7 days, please contact the clinic through [employer] or phone. If you have a critical or abnormal lab result, we will notify you by phone as soon as possible.  Submit refill requests through [employer] or call your pharmacy and they will forward the refill request to us. Please allow 3 business days for your refill to be completed.          Additional Information About Your Visit        [employer] Information     [employer] gives you secure access to your electronic health record. If you see a primary care provider, you can also send messages to your care team and make appointments. If you have questions, please call your primary care clinic.  If you do not have a primary care provider, please call [phone] and they will assist you.        Care EveryWhere ID     This is your Care EveryWhere ID. This could be used by other organizations to access your [place] medical records  [id number]        Your Vitals Were     Pulse Temperature Pulse Oximetry             131 98.7  F (37.1  C) (Oral) 100%          Blood Pressure from Last 3 Encounters:   02/21/17 130/80   02/21/17 137/79   02/19/17 (!) 146/91    Weight from Last 3 Encounters:   02/19/17 212 lb (96.2 kg)   02/10/17 209 lb (94.8 kg)   02/09/17 212 lb 9.6 oz (96.4 kg)              We Performed the Following     CARDIOLOGY EVAL ADULT REFERRAL          Today's Medication Changes          These changes are accurate as of: 2/21/17  2:46 PM.  If you have any questions, ask your nurse or doctor.               These medicines have changed or have updated prescriptions.        Dose/Directions    methylPREDNISolone 4 MG tablet   Commonly known as:  MEDROL  DOSEPAK   This may have changed:    - when to take this  - reasons to take this  - additional instructions        Follow package instructions   Quantity:  21 tablet   Refills:  0       * oxyCODONE 5 MG capsule   Commonly known as:  OXY-IR   This may have changed:  Another medication with the same name was added. Make sure you understand how and when to take each.        Dose:  5 mg   Take 5 mg by mouth every 6 hours as needed for moderate to severe pain Reported on 2/21/2017   Refills:  0       * oxyCODONE 10 MG 12 hr tablet   Commonly known as:  OxyCONTIN   This may have changed:  Another medication with the same name was added. Make sure you understand how and when to take each.   Used for:  Chronic back pain, unspecified back location, unspecified back pain laterality        1 tab q 6 hours daily for 1 week   Quantity:  28 tablet   Refills:  0       * oxyCODONE 10 MG 12 hr tablet   Commonly known as:  OxyCONTIN   This may have changed:  Another medication with the same name was added. Make sure you understand how and when to take each.   Used for:  Chronic back pain, unspecified back location, unspecified back pain laterality        1 tab 3 times daily for one week.   Quantity:  21 tablet   Refills:  0       * oxyCODONE 10 MG 12 hr tablet   Commonly known as:  OxyCONTIN   This may have changed:  Another medication with the same name was added. Make sure you understand how and when to take each.   Used for:  Chronic back pain, unspecified back location, unspecified back pain laterality        Dose:  10 mg   Take 1 tablet (10 mg) by mouth daily   Quantity:  7 tablet   Refills:  0       * oxyCODONE 10 MG IR tablet   Commonly known as:  ROXICODONE   This may have changed:  Another medication with the same name was added. Make sure you understand how and when to take each.   Used for:  Chronic back pain, unspecified back location, unspecified back pain laterality        Dose:  10 mg   Take 1 tablet (10 mg) by mouth  every 6 hours as needed for moderate to severe pain   Quantity:  28 tablet   Refills:  0       * oxyCODONE 10 MG IR tablet   Commonly known as:  ROXICODONE   This may have changed:  Another medication with the same name was added. Make sure you understand how and when to take each.   Used for:  Chronic pain syndrome        Dose:  10 mg   Take 1 tablet (10 mg) by mouth every 8 hours as needed for moderate to severe pain   Quantity:  21 tablet   Refills:  0       * oxyCODONE 10 MG IR tablet   Commonly known as:  ROXICODONE   This may have changed:  You were already taking a medication with the same name, and this prescription was added. Make sure you understand how and when to take each.   Used for:  Other chronic pain        Dose:  10 mg   Take 1 tablet (10 mg) by mouth 2 times daily for 14 days   Quantity:  28 tablet   Refills:  0       * oxyCODONE 10 MG 12 hr tablet   Commonly known as:  OxyCONTIN   This may have changed:  Another medication with the same name was added. Make sure you understand how and when to take each.   Used for:  Lower urinary tract symptoms (LUTS)        Dose:  10 mg   Start taking on:  2/24/2017   Take 1 tablet (10 mg) by mouth every 12 hours as needed for moderate to severe pain maximum 2 tablet(s) per day   Quantity:  14 tablet   Refills:  0       * oxyCODONE 5 MG IR tablet   Commonly known as:  ROXICODONE   This may have changed:  You were already taking a medication with the same name, and this prescription was added. Make sure you understand how and when to take each.   Used for:  Other chronic pain        Dose:  5 mg   Start taking on:  3/7/2017   Take 1 tablet (5 mg) by mouth 2 times daily for 14 days maximum 2 tablet(s) per day   Quantity:  28 tablet   Refills:  0       * Notice:  This list has 9 medication(s) that are the same as other medications prescribed for you. Read the directions carefully, and ask your doctor or other care provider to review them with you.         Where to  get your medicines      Some of these will need a paper prescription and others can be bought over the counter.  Ask your nurse if you have questions.     Bring a paper prescription for each of these medications     oxyCODONE 10 MG IR tablet    oxyCODONE 5 MG IR tablet                Primary Care Provider Office Phone # Fax #    Tristanflorecne JASON Meyer [phone]        [facility] [place] [id number] [place] [id number]        Thank you!     Thank you for choosing [facility]  for your care. Our goal is always to provide you with excellent care. Hearing back from our patients is one way we can continue to improve our services. Please take a few minutes to complete the written survey that you may receive in the mail after your visit with us. Thank you!             Your Updated Medication List - Protect others around you: Learn how to safely use, store and throw away your medicines at www.disposemymeds.org.          This list is accurate as of: 2/21/17  2:46 PM.  Always use your most recent med list.                   Brand Name Dispense Instructions for use    * clonazePAM 0.5 MG tablet    klonoPIN    16 tablet    Take 0.5 tablets (0.25 mg) by mouth 2 times daily as needed for anxiety       * clonazePAM 1 MG tablet    klonoPIN    4 tablet    Take 0.5-1 tablets (0.5-1 mg) by mouth 2 times daily as needed for anxiety       cyclobenzaprine 5 MG tablet    FLEXERIL     Take 5 mg by mouth       DULoxetine 30 MG EC capsule    CYMBALTA    60 capsule    1 tab daily for 1 week then 1 tab BID       FLUoxetine 20 MG capsule    PROzac    90 capsule    Take 3 capsules (60 mg) by mouth daily       folic acid 1 MG tablet    FOLVITE         HUMIRA 40 MG/0.8ML prefilled syringe kit   Generic drug:  adalimumab      Inject 40 mg Subcutaneous Reported on 2/21/2017       hydrOXYzine 25 MG tablet    ATARAX    90 tablet    Take 1-2 tablets (25-50 mg) by mouth every 6 hours as needed for  anxiety       IBUPROFEN PO      Take 800 mg by mouth every 6 hours as needed for moderate pain Reported on 2/21/2017       methotrexate 2.5 MG tablet CHEMO      6 tablets Reported on 2/21/2017       methylPREDNISolone 4 MG tablet    MEDROL DOSEPAK    21 tablet    Follow package instructions       * oxyCODONE 5 MG capsule    OXY-IR     Take 5 mg by mouth every 6 hours as needed for moderate to severe pain Reported on 2/21/2017       * oxyCODONE 10 MG 12 hr tablet    OxyCONTIN    28 tablet    1 tab q 6 hours daily for 1 week       * oxyCODONE 10 MG 12 hr tablet    OxyCONTIN    21 tablet    1 tab 3 times daily for one week.       * oxyCODONE 10 MG 12 hr tablet    OxyCONTIN    7 tablet    Take 1 tablet (10 mg) by mouth daily       * oxyCODONE 10 MG IR tablet    ROXICODONE    28 tablet    Take 1 tablet (10 mg) by mouth every 6 hours as needed for moderate to severe pain       * oxyCODONE 10 MG IR tablet    ROXICODONE    21 tablet    Take 1 tablet (10 mg) by mouth every 8 hours as needed for moderate to severe pain       * oxyCODONE 10 MG IR tablet    ROXICODONE    28 tablet    Take 1 tablet (10 mg) by mouth 2 times daily for 14 days       * oxyCODONE 10 MG 12 hr tablet   Start taking on:  2/24/2017    OxyCONTIN    14 tablet    Take 1 tablet (10 mg) by mouth every 12 hours as needed for moderate to severe pain maximum 2 tablet(s) per day       * oxyCODONE 5 MG IR tablet   Start taking on:  3/7/2017    ROXICODONE    28 tablet    Take 1 tablet (5 mg) by mouth 2 times daily for 14 days maximum 2 tablet(s) per day       tamsulosin 0.4 MG capsule    FLOMAX    30 capsule    Take 1 capsule (0.4 mg) by mouth daily       * Notice:  This list has 11 medication(s) that are the same as other medications prescribed for you. Read the directions carefully, and ask your doctor or other care provider to review them with you.

## 2017-02-21 NOTE — NURSING NOTE
"Chief Complaint   Patient presents with     Back Pain       Initial /80  Pulse 131  Temp 98.7  F (37.1  C) (Oral)  SpO2 100% Estimated body mass index is 25.81 kg/(m^2) as calculated from the following:    Height as of 1/13/17: 6' 4\" (1.93 m).    Weight as of 2/19/17: 212 lb (96.2 kg).  Medication Reconciliation: complete     Ericka Novak CMA      "

## 2017-02-21 NOTE — PROGRESS NOTES
"  SUBJECTIVE:                                                    Jailene Breen is a 51 year old male who presents to clinic today for the following health issues:      Concern - passed out      Onset: 6 hours ago , day prior was having some orthostatic lightheadedness    Description:    passed out and fell at work this morning   Hurt right hand and right elbow     I Progression of Symptoms:  worsening    Accompanying Signs & Symptoms:  Was seen at the er today  Jerry Lewis   Did blood tests, ekg, Xrs And CTs, all normal, including a troponin. Was advised to d/c flomax.  Per patient they did do orthostatic BPs in Emergency Department>  Denies dizziness     Previous history of similar problem:   No     Precipitating factors:   Worsened by: nothing     Alleviating factors:  Improved by: nothing        Therapies Tried and outcome: nothing   Was in UC over the weekend (2 days ago), received 4 1 mg klonopins, Per , did fill the rx for oxycontin I had incorrectly written for at a previoius visit and returned in after two days with an unexpectedly low number of pills.  I did get patient's [facility] pain records- was recently d/c with [facility] for violating repeatedly their pain management agreement though after starting with them 12/2016 , as per , only received opiates from them until was d/c in early February.  No klonopin the past 2 days., does present with 3 previous incorrect Rxs for opiates.  Does seem to be doing better on the short acting.  He is already out of his rx from 2/17 which he blames on recent RA flare..     Flomax did seem to be helping urinary symptoms      Allergies   Allergen Reactions     Hydrocodone Itching     Remeron Soltab Other (See Comments)     \"Blacked out\" for one week       Past Medical History   Diagnosis Date     Anxiety      Arthritis      back, knees     Back pain      possible drug seeking behavior in the past.      Panic attacks          Current Outpatient Prescriptions on " File Prior to Visit:  cyclobenzaprine (FLEXERIL) 5 MG tablet Take 5 mg by mouth   clonazePAM (KLONOPIN) 1 MG tablet Take 0.5-1 tablets (0.5-1 mg) by mouth 2 times daily as needed for anxiety   tamsulosin (FLOMAX) 0.4 MG capsule Take 1 capsule (0.4 mg) by mouth daily   DULoxetine (CYMBALTA) 30 MG EC capsule 1 tab daily for 1 week then 1 tab BID   methylPREDNISolone (MEDROL DOSEPAK) 4 MG tablet Follow package instructions (Patient taking differently: as needed Follow package instructions)   folic acid (FOLVITE) 1 MG tablet    oxyCODONE (ROXICODONE) 10 MG IR tablet Take 1 tablet (10 mg) by mouth every 8 hours as needed for moderate to severe pain (Patient not taking: Reported on 2/21/2017)   oxyCODONE (ROXICODONE) 10 MG IR tablet Take 1 tablet (10 mg) by mouth every 6 hours as needed for moderate to severe pain (Patient not taking: Reported on 2/19/2017)   oxyCODONE (OXYCONTIN) 10 MG 12 hr tablet 1 tab q 6 hours daily for 1 week (Patient not taking: Reported on 2/21/2017)   oxyCODONE (OXYCONTIN) 10 MG 12 hr tablet 1 tab 3 times daily for one week. (Patient not taking: Reported on 2/19/2017)   [START ON 2/24/2017] oxyCODONE (OXYCONTIN) 10 MG 12 hr tablet Take 1 tablet (10 mg) by mouth every 12 hours as needed for moderate to severe pain maximum 2 tablet(s) per day (Patient not taking: Reported on 2/19/2017)   oxyCODONE (OXYCONTIN) 10 MG 12 hr tablet Take 1 tablet (10 mg) by mouth daily (Patient not taking: Reported on 2/19/2017)   oxyCODONE (OXY-IR) 5 MG capsule Take 5 mg by mouth every 6 hours as needed for moderate to severe pain Reported on 2/21/2017   FLUoxetine (PROZAC) 20 MG capsule Take 3 capsules (60 mg) by mouth daily   hydrOXYzine (ATARAX) 25 MG tablet Take 1-2 tablets (25-50 mg) by mouth every 6 hours as needed for anxiety   methotrexate 2.5 MG tablet 6 tablets Reported on 2/21/2017   adalimumab (HUMIRA) 40 MG/0.8ML prefilled syringe kit Inject 40 mg Subcutaneous Reported on 2/21/2017   IBUPROFEN PO Take 800  mg by mouth every 6 hours as needed for moderate pain Reported on 2/21/2017     No current facility-administered medications on file prior to visit.     Social History   Substance Use Topics     Smoking status: Never Smoker     Smokeless tobacco: Current User     Types: Chew      Comment: Chew     Alcohol use No      Comment: none       ROS:  General: negative for fever  CV:as above  Musc as above    OBJECTIVE:  /80  Pulse 131  Temp 98.7  F (37.1  C) (Oral)  Resp 12  SpO2 100%   General:   awake, alert, and cooperative.  NAD.   Head: Normocephalic, atraumatic.  Eyes: Conjunctiva clear,   RESP- regular breathing rate  Neuro: Alert and oriented - normal speech.    ASSESSMENT:well appearing    ICD-10-CM    1. Syncope, unspecified syncope type R55 CARDIOLOGY EVAL ADULT REFERRAL   2. Other chronic pain G89.29 oxyCODONE (ROXICODONE) 10 MG IR tablet     oxyCODONE (ROXICODONE) 5 MG IR tablet   3. Anxiety F41.9        PLAN: 3 prev rxs from last visit were destroyed. The second rx from today will be placed for  in 2 weeks pending  review for compliance.  We did discuss that the only opiates I will be able to prescribe are a taper as above and this is dependent on his future compliance, which has been marred in the past by running out early and loss of meds.  As he was recently on 80-90 mg oxycodone daily, we have made progress but we did discuss that he could very well need more supportive outpt services to get off them fully given the past 6 months of his course on them.  We did discuss will likely need to start more or different non controlled meds for anxiety and pain in part so we can be more aggressive with prednisone with his RA flares. Advised about symptoms which might herald more serious problems.

## 2017-02-21 NOTE — ED NOTES
Patient has had 2 episodes loss of consciousness. One near-syncopal episode yesterday where his daughter caught him. Today, patient was feeling fine and went to work. 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 He was dizzy before each episode. History of back problems. ABCDs intact. Alert and oriented x 4.

## 2017-02-21 NOTE — ED PROVIDER NOTES
History     Chief Complaint:  Syncope    The history is provided by the patient.      Jailene Breen is a 51 year old male with a history of panic attacks who presents for evaluation after a syncopal event. Yesterday, the patient experienced lightheadedness with standing from a sitting position. The patient also experienced a syncopal episode yesterday, but his daughter caught him before he hit the ground. Today, the patient experienced another syncopal episode while at work and was found lying on the floor. The patient experienced shakiness following the episode. He believes he may have hit his head, but he was wearing a work helmet at the time of the episode. He is currently experiencing neck pain, right wrist pain, right elbow pain, right knee pain, and back pain that he attributes to the fall. He was able to ambulate after the fall. No headache. He states that these episodes felt different from his usual panic attacks.     Allergies:  Hydrocodone  Remeron soltab     Medications:    Flexeril  Klonopin  Oxycodone  Flomax  Cymbalta  OxyContin  Prozac  Medrol Dosepak  Methotrexate  Humira  Folvite  Ibuprofen    Past Medical History:    Anxiety  Arthritis  Back pain  Panic attacks  Hyperlipidemia    Past Surgical History:    Orthopedic right knee surgery x2  Orthopedic left foot surgery  Arthroscopy knee  Inject paravertebral facet joint lumbar / sacral first    Family History:    History reviewed. No pertinent family history.    Social History:  Marital Status:   Presents to the ED alone  Tobacco Use: positive  Alcohol Use: negative  PCP: Aiden Resendez     Review of Systems   Musculoskeletal: Positive for back pain and neck pain.        Positive for right wrist, right elbow, and right knee pain   Neurological: Positive for tremors, syncope and light-headedness. Negative for headaches.   All other systems reviewed and are negative.    Physical Exam   First Vitals:  BP: 160/103 mmHg  Heart Rate:  90   Resp: 20  SpO2: 100% RA  Temp: 98  F (oral)       Physical Exam  Constitutional:  Patient appears well-developed and well-nourished. Mild distress  HENT:   Head: No obvious external signs of trauma noted. Head is without raccoon's eyes and without Rodriguez's sign. No external signs of basilar skull fracture. No signs of facial bone instability.  Right Ear: External ear and ear canal normal. No lacerations. No mastoid tenderness. No hemotympanum.   Left Ear: External ear and ear canal normal. No lacerations. No mastoid tenderness. No hemotympanum.   Nose: No nose lacerations, nasal deformity, septal deviation or nasal septal hematoma. No epistaxis.   Mouth/Throat: Uvula is midline, oropharynx is clear and moist and mucous membranes are normal.   Eyes: Conjunctivae and EOM are normal. Pupils are equal, round, and reactive to light.   Neck: Trachea normal, normal range of motion. Neck supple. There is (approximately) C5 bspinous process tenderness present. No tracheal deviation present.   Cardiovascular: Normal rate, regular rhythm, S1 normal, S2 normal and normal pulses.   Pulmonary/Chest: Effort normal and breath sounds normal. No accessory muscle usage. No respiratory distress. Patient has no decreased breath sounds. Patient has no wheezes. Patient has no rhonchi. Patient has no rales. Patient exhibits no bony tenderness and no retraction.   Abdominal: Soft. Normal appearance and bowel sounds are normal. Patient exhibits no distension. There is no tenderness. There is no rebound.   Musculoskeletal:        Cervical back: Midline tenderness as above. There is right paraspinal tenderness.   Extremities:  Right hand, 5th metacarpal bony tenderness.  Mild right knee soft tissue tenderness  Neurological: Patient is alert and oriented to person, place, and time. Patient has normal strength. Patient is not disoriented. No cranial nerve deficit or sensory deficit. GCS eye subscore is 4. GCS verbal subscore is 5. GCS motor  subscore is 6.   Skin: Skin is warm, dry and intact.     Emergency Department Course   ECG:  @ 0821  Indication: syncope  Vent. Rate 81 bpm. WY interval 162 ms. QRS duration 104 ms. QT/QTc 372/432 ms. P-R-T axis 81 54 69.   Normal sinus rhythm with sinus arrhythmia. Normal ECG.    Read @ 0828 by Dr. Antunez.    Imaging:  Radiographic findings were communicated with the patient who voiced understanding of the findings.    Lumbar spine XR, 2-3 views  Vertebral heights are preserved without evidence of  fracture. Mild disc space loss at L5-S1 unchanged. Mild facet  degenerative changes at the lumbosacral junction.  Report per radiology.    Hand XR, G/E 3 views, right  No fractures are seen in the right hand. Joints are  preserved and in normal alignment.  Report per radiology.    XR Chest 1 View  Cardiac silhouette and pulmonary vasculature are within  normal limits. No focal airspace disease, pleural effusion or  pneumothorax.  Report per radiology.    CT Head w/o Contrast  Normal head CT  Preliminary radiology read.      Cervical spine CT w/o contrast  No evidence for fracture or traumatic malalignment of the  cervical spine.  Preliminary radiology read.      Laboratory:  CBC:  WBC 9.8, HGB 15.8, , otherwise WNL  BMP: glucose 114 (H), otherwise WNL (Creatinine 0.92)  Troponin: <0.015    Interventions:  (1024) Normal Saline, 1 liter, IV bolus  (0852) Valium, 2.5 mg, IV  (0853) Tylenol, 650 mg, PO  (0852) Oxycodone, 10 mgm PO  (1024) Morphine, 4 mg, IV injection  (1130) Morphine, 4 mg, IV injection    The patient's symptoms were improved with parenteral narcotics.    Emergency Department Course:  Nursing notes and vitals reviewed.  I performed an exam of the patient as documented above.   EKG was done, interpretation as above.  A peripheral IV was established. Blood was drawn from the patient. This was sent for laboratory testing, findings above.   The patient was sent for a head CT, cervical spine CT, chest  x-ray, hand x-ray, and lumbar spine x-ray while in the emergency department, findings above.   (1004) I rechecked the patient.  (1218) I rechecked the patient and updated him on hist results.   Findings and plan explained to the patient. Patient discharged home with instructions regarding supportive care, medications, and reasons to return. The importance of close follow-up was reviewed.   I personally reviewed the laboratory results with the patient and answered all related questions prior to discharge.     Impression & Plan    Medical Decision Making:  Jamshid Dent, 51 year old male in room 10. Patient presents to the ER after having some kind of a syncopal episode at work. The patient s blood work is within normal limits and his EKG shows sinus rhythm. He states that he recently started taking Flomax and Cymbalta. I wonder if the Flomax could be contributing to this. His radiologic imaging is otherwise unremarkable. Specifically, he has no cervical spine fractures, nor do I see any fractures in his right hand or lumbar spine. The patient does have chronic back pain and does see a pain clinic. We did give him pain medications in the ER, but as he has home medications, we will not be sending him home with any medicines, I have encouraged him to consider the addition of ibuprofen. He should follow with his primary care provider in the outpatient setting. Anticipatory guidance given prior to discharge.     Diagnosis:    ICD-10-CM    1. Syncope, unspecified syncope type R55    2. Chronic right-sided low back pain without sciatica M54.5     G89.29    3. Contusion of right hand, initial encounter S60.221A    4. Contusion of right knee, initial encounter S80.01XA        Disposition:  Discharge to home.    I, Isac Schwarz, am serving as a scribe on 2/21/2017 at 8:17 AM to personally document services performed by Dr. Antunez based on my observations and the provider's statements to me.        Max Antunez,  DO  02/21/17 1523

## 2017-02-21 NOTE — LETTER
[facility] EMERGENCY DEPARTMENT  [address]  [place] [address]  [phone]    Jailene Breen  [id number] [facility] [address]  [phone] (home) none (work)    : 1965      To Whom it may concern:    Jailene Breen was seen in our Emergency Department today, 2017.  I expect his condition to improve over the next 1-2 days.  He may return to work when improved.    Sincerely,            Max Antunez D.O.

## 2017-02-21 NOTE — DISCHARGE INSTRUCTIONS
"    Neck/Back Pain: General    Both neck and back pain are usually caused by injury to the muscles or ligaments of the spine. Sometimes the disks that separate each bone of the spine may cause pain by pressing on a nearby nerve. Back and neck pain may appear after a sudden twisting/bending force (such as in a car accident), or sometimes after a simple awkward movement. In either case, muscle spasm is often present and adds to the pain.  Acute neck and back pain usually gets better in 1 to 2 weeks. Pain related to disk disease, arthritis in the spinal joints or spinal stenosis (narrowing of the spinal canal) can become chronic and last for months or years.  Back and neck pain are common problems. Most people feel better in 1 or 2 weeks, and most of the rest in 1 to 2 months. Most people can remain active.  Symptoms  People experience and describe pain differently.    Pain can be sharp, stabbing, shooting, aching, cramping, or burning    Movement, standing, bending, lifting, sitting, or walking may worsen the pain    Pain can be localized to one spot or area, or it can be more generalized    Pain can spread or radiate upwards, downwards, to the front, or go down your arms    Muscle spasm may occur.  Cause  Most of the time \"mechanical problems\" with the muscles or spine cause the pain. it is usually caused by an injury, whether known or not, to the muscles or ligaments. While illnesses can cause back pain, it is usually not caused by a serious illness. Pain is usually related to physical activity, whether sports, exercise, work, or normal activity. Sometimes it can occur without an identifiable cause. This can happen simply by stretching or moving wrong, without noting pain at the time. Other causes include:    Overexertion, lifting, pushing, pulling incorrectly or too aggressively.    Sudden twisting, bending or stretching from an accident (car or fall), or accidental movement.    Poor posture    Poor conditioning, " lack of regular exercise    Spinal disc disease or arthritis    Stress    Pregnancy, or illness like appendicitis, bladder or kidney infection, pelvic infections   Home care    For neck pain: Use a comfortable pillow that supports the head and keeps the spine in a neutral position. The position of the head should not be tilted forward or backward.    When in bed, try to find a position of comfort. A firm mattress is best. Try lying flat on your back with pillows under your knees. You can also try lying on your side with your knees bent up towards your chest and a pillow between your knees.    At first, do not try to stretch out the sore spots. If there is a strain, it is not like the good soreness you get after exercising without an injury. In this case, stretching may make it worse.    Avoid prolonged sitting, long car rides or travel. This puts more stress on the lower back than standing or walking.    During the first 24 to 72 hours after an injury, apply an ice pack to the painful area for 20 minutes and then remove it for 20 minutes over a period of 60 to 90 minutes or several times a day. As a safety precaution, do not use a heating pad at bedtime. Sleeping with a heating pad can lead to skin burns or tissue damage.    Ice and heat therapies can be alternated. Talk with your health care provider about the best treatment for your back or neck pain.    Therapeutic massage can help relax the back and neck muscles without stretching them.    Be aware of safe lifting methods and do not lift anything over 15 pounds until all the pain is gone.  Medications  Talk to your health care provider before using medications, especially if you have other medical problems or are taking other medicines.    You may use acetaminophen (such as Tylenol) or ibuprofen (such as Advil or Motrin) to control pain, unless another pain medicine was prescribed. If you have chronic conditions like diabetes, liver or kidney disease, stomach  ulcers,  gastrointestinal bleeding, or are taking blood thinner medications.    Be careful if you are given pain medicines, narcotics, or medication for muscle spasm. They can cause drowsiness, and can affect your coordination, reflexes, and judgment. Do not drive or operate heavy machinery.  Follow-up care  Follow up with your health care provider if your symptoms do not start to improve after one week. Physical therapy or further tests may be needed.  If X-rays were taken, they will be reviewed by a radiologist. You will be notified of any new findings that may affect your care.  Call 911  Seek emergency medical care if any of the following occur:    Trouble breathing    Confusion    Very drowsy or trouble awakening    Fainting or loss of consciousness    Rapid or very slow heart rate    Loss of bowel or bladder control  When to seek medical care  Get prompt medical attention if any of the following occur:    Pain becomes worse or spreads into your arms or legs    Weakness, numbness or pain in one or both arms or legs    Numbness in the groin area    Difficulty walking    Fever of 100.4 F (38 C) or higher, or as directed by your healthcare provider    3809-3429 [employer]. [address] [place], [place] [id number]. All rights reserved. This information is not intended as a substitute for professional medical care. Always follow your healthcare professional's instructions.      Fainting: Uncertain Cause  Fainting (syncope) is a temporary loss of consciousness. It s also called passing out. It occurs when blood flow to the brain is less than normal. Near-fainting (near-syncope) is very similar to fainting, but you don t fully pass out.  Common minor causes of fainting include:    Sudden fear    Pain    Nausea    Emotional stress    Overexertion  Suddenly standing up after sitting or lying for a long time can also cause fainting.  More serious causes of fainting include:    Very slow or very fast  heartbeat (arrhythmia)    Other types of heart disease    Dehydration    Loss of blood loss    Seizure    Stroke    Ruptured blood vessel in the brain  Taking too much high blood pressure medicine can also cause low blood pressure and fainting.  Your health care provider does not know the exact cause of your fainting. But the tests today did not show any of the serious causes of fainting. Sometimes you may need more tests to find out if you have a serious problem. That s why it s important to follow up with your provider as advised.  Home care  Follow these guidelines when caring for yourself at home:    Rest today. You may go back to your normal activities when you are feeling back to normal. It is best to stay with someone who can check on you for the next 24 hours to watch for another episode of fainting.    If you become lightheaded or dizzy, lie down right away. Or sit with your head between your knees.    Because the provider doesn t know the exact cause of your fainting or near-fainting spell, it s possible for you to have another spell without warning. Because of this, don t drive a car or operate dangerous equipment. Don t take a bath alone. Use a shower instead. Don t swim alone until your health care provider says that you are no longer in danger of having another fainting spell.  Follow-up care  Follow up with your health care provider, or as advised.  When to seek medical advice  Call your health care provider right away if any of these occur:    Another fainting spell that s not explained by the common causes listed above    Pain in your chest, arm, neck, jaw, back, or abdomen    Shortness of breath    Severe headache or seizure    Blood in vomit or stools (black or red color)    Unexpected vaginal bleeding    Your heart beats very rapidly, very slowly, or irregularly (palpitations)  Also call your provider if you have signs of stroke:    Weakness in an arm or leg or on one side of the  face    Difficulty speaking or seeing    Extreme drowsiness, confusion, dizziness, or fainting    9017-3213 [employer]. [address] [place] [address], [place] [address]. All rights reserved. This information is not intended as a substitute for professional medical care. Always follow your healthcare professional's instructions.        Bruises (Contusions)    A contusion is a bruise. A bruise happens when a blow to your body doesn't break the skin but does break blood vessels beneath the skin. Blood leaking from the broken vessels causes redness and swelling. As it heals, your bruise is likely to turn colors like purple, green, and yellow. This is normal. The bruise should fade in 2 or 3 weeks.  Factors that make you more likely to bruise  Almost everyone bruises now and then. Certain people do bruise more easily than others. You're more prone to bruising as you get older. That's because blood vessels become more fragile with age. You're also more likely to bruise if you have a clotting disorder such as hemophilia or take medications that reduce clotting, including aspirin.  When to go to the emergency room (ER)  Bruises almost always heal on their own without special treatment. But for some people, a bad bruise can be serious. Seek medical care if you:    Have a clotting disorder such as hemophilia.    Have cirrhosis or other serious liver disease.    Take blood-thinning medications such as warfarin (Coumadin).  What to expect in the ER  A doctor will examine your bruise and ask about any health conditions you have. In some cases, you may have a test to check how well your blood clots. Other treatment will depend on your needs.  Follow-up care  Sometimes a bruise gets worse instead of better. It may become larger and more swollen. This can occur when your body walls off a small pool of blood under the skin (hematoma). In very rare cases, your doctor may need to drain excess blood from the area.  Tip:  Apply  an ice pack or bag of frozen peas to a bruise (keep a thin cloth between the cold source and your skin). This can help reduce redness and swelling.     8310-5514 [employer]. [address] [place] [address], [place] [address]. All rights reserved. This information is not intended as a substitute for professional medical care. Always follow your healthcare professional's instructions.

## 2017-02-22 NOTE — TELEPHONE ENCOUNTER
Called pharmacy to relay message below. Pharmacist said when she called again, our clinic let her know message below and filled prescription for patient.    Ericka Novak CMA

## 2017-02-24 ENCOUNTER — MYC MEDICAL ADVICE (OUTPATIENT)
Dept: FAMILY MEDICINE | Facility: CLINIC | Age: 52
End: 2017-02-24

## 2017-02-24 DIAGNOSIS — F41.9 ANXIETY: Primary | ICD-10-CM

## 2017-02-24 RX ORDER — CLONAZEPAM 1 MG/1
0.5-1 TABLET ORAL 2 TIMES DAILY PRN
Qty: 4 TABLET | Refills: 0 | Status: SHIPPED | OUTPATIENT
Start: 2017-02-24 | End: 2017-05-25

## 2017-02-24 RX ORDER — BUSPIRONE HYDROCHLORIDE 15 MG/1
TABLET ORAL
Qty: 90 TABLET | Refills: 1 | Status: SHIPPED
Start: 2017-02-24 | End: 2017-07-28

## 2017-02-24 NOTE — TELEPHONE ENCOUNTER
..Reason for Call  anxiety    Detailed comments: has been having panic attacks all day; seen on 2-21; please call;  *declined triage    Phone Number Patient can be reached at: Home number on file [phone] (home)    Best Time: anytime    Can we leave a detailed message on this number? YES    Call taken on 2/24/2017 at 2:27 PM by Nohemi Sanders

## 2017-02-26 ENCOUNTER — OFFICE VISIT (OUTPATIENT)
Dept: URGENT CARE | Facility: URGENT CARE | Age: 52
End: 2017-02-26
Payer: COMMERCIAL

## 2017-02-26 VITALS
TEMPERATURE: 96.7 F | DIASTOLIC BLOOD PRESSURE: 91 MMHG | WEIGHT: 205 LBS | BODY MASS INDEX: 24.95 KG/M2 | OXYGEN SATURATION: 100 % | SYSTOLIC BLOOD PRESSURE: 138 MMHG | HEART RATE: 119 BPM

## 2017-02-26 DIAGNOSIS — R39.9 LOWER URINARY TRACT SYMPTOMS (LUTS): ICD-10-CM

## 2017-02-26 DIAGNOSIS — G89.29 OTHER CHRONIC PAIN: Primary | ICD-10-CM

## 2017-02-26 PROCEDURE — 99213 OFFICE O/P EST LOW 20 MIN: CPT | Performed by: PHYSICIAN ASSISTANT

## 2017-02-26 ASSESSMENT — PAIN SCALES - GENERAL: PAINLEVEL: WORST PAIN (10)

## 2017-02-26 NOTE — NURSING NOTE
"Chief Complaint   Patient presents with     Back Pain     down to taking 2 a day for pain meds       Initial BP (!) 138/91 (BP Location: Left arm, Patient Position: Chair, Cuff Size: Adult Large)  Pulse 119  Temp 96.7  F (35.9  C) (Oral)  Wt 205 lb (93 kg)  SpO2 100%  BMI 24.95 kg/m2 Estimated body mass index is 24.95 kg/(m^2) as calculated from the following:    Height as of 1/13/17: 6' 4\" (1.93 m).    Weight as of this encounter: 205 lb (93 kg).  Medication Reconciliation: complete   Asha Stoll CMA      "

## 2017-02-26 NOTE — MR AVS SNAPSHOT
After Visit Summary   2/26/2017    Jailene Breen    MRN: [id number]           Patient Information     Date Of Birth          1965        Visit Information        Provider Department      2/26/2017 9:05 AM Aiden Resendez PA [facility]        Today's Diagnoses     Other chronic pain    -  1    Lower urinary tract symptoms (LUTS)          Care Instructions    [email or web address]        Follow-ups after your visit        Follow-up notes from your care team     Return if symptoms worsen or fail to improve.      Your next 10 appointments already scheduled     Mar 31, 2017  9:00 AM CDT   New Visit with JUANITA Ga CNP   [facility] ([place] [facility])    [address] [facility] [address] [phone]              Who to contact     If you have questions or need follow up information about today's clinic visit or your schedule please contact [facility] directly at [phone].  Normal or non-critical lab and imaging results will be communicated to you by [employer], letter or phone within 4 business days after the clinic has received the results. If you do not hear from us within 7 days, please contact the clinic through [employer] or phone. If you have a critical or abnormal lab result, we will notify you by phone as soon as possible.  Submit refill requests through [employer] or call your pharmacy and they will forward the refill request to us. Please allow 3 business days for your refill to be completed.          Additional Information About Your Visit        MyChart Information     [employer] gives you secure access to your electronic health record. If you see a primary care provider, you can also send messages to your care team and make appointments. If you have questions, please call your primary care clinic.  If you do not have a primary care provider, please call [phone] and  they will assist you.        Care EveryWhere ID     This is your Care EveryWhere ID. This could be used by other organizations to access your [place] medical records  [id number]        Your Vitals Were     Pulse Temperature Pulse Oximetry BMI (Body Mass Index)          119 96.7  F (35.9  C) (Oral) 100% 24.95 kg/m2         Blood Pressure from Last 3 Encounters:   02/26/17 (!) 138/91   02/21/17 130/80   02/21/17 137/79    Weight from Last 3 Encounters:   02/26/17 205 lb (93 kg)   02/19/17 212 lb (96.2 kg)   02/10/17 209 lb (94.8 kg)              Today, you had the following     No orders found for display       Primary Care Provider Office Phone # Fax #    JASON Riec [phone]        [facility] [address] [facility] [address]        Thank you!     Thank you for choosing [facility]  for your care. Our goal is always to provide you with excellent care. Hearing back from our patients is one way we can continue to improve our services. Please take a few minutes to complete the written survey that you may receive in the mail after your visit with us. Thank you!             Your Updated Medication List - Protect others around you: Learn how to safely use, store and throw away your medicines at www.disposemymeds.org.          This list is accurate as of: 2/26/17  9:33 AM.  Always use your most recent med list.                   Brand Name Dispense Instructions for use    busPIRone 15 MG tablet    BUSPAR    90 tablet    O.5 tabs PO BID for 3 days then 0.5 tab qam and 1 tab qhs for 3 days then 1 tab bid for 3 days.  If still sympotmatic,  may increase to 1 tab QAM and 1.5 tabs at bedtime for 3 days, then 1.5 tabs BID for 3 days, then 1.5 tabs q am and 2 tabs QHS for 3 days, then 2 tabs BID.       * clonazePAM 1 MG tablet    klonoPIN    4 tablet    Take 0.5-1 tablets (0.5-1 mg) by mouth 2 times daily as needed for anxiety       * clonazePAM 1 MG tablet     klonoPIN    4 tablet    Take 0.5-1 tablets (0.5-1 mg) by mouth 2 times daily as needed for anxiety       cyclobenzaprine 5 MG tablet    FLEXERIL     Take 5 mg by mouth       DULoxetine 30 MG EC capsule    CYMBALTA    60 capsule    1 tab daily for 1 week then 1 tab BID       FLUoxetine 20 MG capsule    PROzac    90 capsule    Take 3 capsules (60 mg) by mouth daily       folic acid 1 MG tablet    FOLVITE         HUMIRA 40 MG/0.8ML prefilled syringe kit   Generic drug:  adalimumab      Inject 40 mg Subcutaneous Reported on 2/21/2017       hydrOXYzine 25 MG tablet    ATARAX    90 tablet    Take 1-2 tablets (25-50 mg) by mouth every 6 hours as needed for anxiety       IBUPROFEN PO      Take 800 mg by mouth every 6 hours as needed for moderate pain Reported on 2/21/2017       methotrexate 2.5 MG tablet CHEMO      6 tablets Reported on 2/21/2017       methylPREDNISolone 4 MG tablet    MEDROL DOSEPAK    21 tablet    Follow package instructions       nicotine polacrilex 2 MG gum    NICORETTE         * oxyCODONE 5 MG capsule    OXY-IR     Take 5 mg by mouth every 6 hours as needed for moderate to severe pain Reported on 2/26/2017       * oxyCODONE 10 MG 12 hr tablet    OxyCONTIN    28 tablet    1 tab q 6 hours daily for 1 week       * oxyCODONE 10 MG 12 hr tablet    OxyCONTIN    21 tablet    1 tab 3 times daily for one week.       * oxyCODONE 10 MG 12 hr tablet    OxyCONTIN    7 tablet    Take 1 tablet (10 mg) by mouth daily       * oxyCODONE 10 MG IR tablet    ROXICODONE    28 tablet    Take 1 tablet (10 mg) by mouth every 6 hours as needed for moderate to severe pain       * oxyCODONE 10 MG IR tablet    ROXICODONE    21 tablet    Take 1 tablet (10 mg) by mouth every 8 hours as needed for moderate to severe pain       * oxyCODONE 10 MG IR tablet    ROXICODONE    28 tablet    Take 1 tablet (10 mg) by mouth 2 times daily for 14 days       * oxyCODONE 10 MG 12 hr tablet    OxyCONTIN    14 tablet    Take 1 tablet (10 mg) by  mouth every 12 hours as needed for moderate to severe pain maximum 2 tablet(s) per day       * oxyCODONE 5 MG IR tablet   Start taking on:  3/7/2017    ROXICODONE    28 tablet    Take 1 tablet (5 mg) by mouth 2 times daily for 14 days maximum 2 tablet(s) per day       tamsulosin 0.4 MG capsule    FLOMAX    30 capsule    Take 1 capsule (0.4 mg) by mouth daily       * Notice:  This list has 11 medication(s) that are the same as other medications prescribed for you. Read the directions carefully, and ask your doctor or other care provider to review them with you.

## 2017-02-27 ENCOUNTER — OFFICE VISIT (OUTPATIENT)
Dept: FAMILY MEDICINE | Facility: CLINIC | Age: 52
End: 2017-02-27
Payer: COMMERCIAL

## 2017-02-27 VITALS
TEMPERATURE: 98.9 F | HEART RATE: 98 BPM | DIASTOLIC BLOOD PRESSURE: 80 MMHG | SYSTOLIC BLOOD PRESSURE: 130 MMHG | WEIGHT: 205 LBS | BODY MASS INDEX: 24.95 KG/M2

## 2017-02-27 DIAGNOSIS — M54.9 CHRONIC BACK PAIN, UNSPECIFIED BACK LOCATION, UNSPECIFIED BACK PAIN LATERALITY: ICD-10-CM

## 2017-02-27 DIAGNOSIS — R55 SYNCOPE, UNSPECIFIED SYNCOPE TYPE: Primary | ICD-10-CM

## 2017-02-27 DIAGNOSIS — M79.10 MUSCLE ACHE: ICD-10-CM

## 2017-02-27 DIAGNOSIS — G89.29 CHRONIC BACK PAIN, UNSPECIFIED BACK LOCATION, UNSPECIFIED BACK PAIN LATERALITY: ICD-10-CM

## 2017-02-27 PROCEDURE — 99214 OFFICE O/P EST MOD 30 MIN: CPT | Performed by: PHYSICIAN ASSISTANT

## 2017-02-27 RX ORDER — OXYCODONE HYDROCHLORIDE 10 MG/1
10 TABLET ORAL EVERY 8 HOURS PRN
Qty: 6 TABLET | Refills: 0 | Status: SHIPPED | OUTPATIENT
Start: 2017-02-27 | End: 2017-08-08

## 2017-02-27 RX ORDER — OXYCODONE HCL 10 MG/1
TABLET, FILM COATED, EXTENDED RELEASE ORAL
Qty: 6 TABLET | Refills: 0 | Status: SHIPPED | OUTPATIENT
Start: 2017-02-27 | End: 2017-02-27

## 2017-02-27 RX ORDER — METHOCARBAMOL 750 MG/1
750 TABLET, FILM COATED ORAL 3 TIMES DAILY PRN
Qty: 60 TABLET | Refills: 1 | Status: SHIPPED | OUTPATIENT
Start: 2017-02-27 | End: 2017-07-28

## 2017-02-27 NOTE — NURSING NOTE
"Chief Complaint   Patient presents with     Hospital F/U       Initial /80  Pulse 98  Temp 98.9  F (37.2  C) (Oral)  Wt 205 lb (93 kg)  BMI 24.95 kg/m2 Estimated body mass index is 24.95 kg/(m^2) as calculated from the following:    Height as of 1/13/17: 6' 4\" (1.93 m).    Weight as of this encounter: 205 lb (93 kg).  Medication Reconciliation: complete       Ericka Novak CMA      "

## 2017-02-27 NOTE — MR AVS SNAPSHOT
After Visit Summary   2/27/2017    Jailene Breen    MRN: [id number]           Patient Information     Date Of Birth          1965        Visit Information        Provider Department      2/27/2017 3:20 PM Nanette Haywood PA-C [facility]        Today's Diagnoses     Syncope, unspecified syncope type    -  1    Muscle ache        Chronic back pain, unspecified back location, unspecified back pain laterality           Follow-ups after your visit        Additional Services     NEUROLOGY ADULT REFERRAL       Your provider has referred you to: [facility]: [facility] [phone]   [email or web address]    Reason for Referral: Consult    Please be aware that coverage of these services is subject to the terms and limitations of your health insurance plan.  Call member services at your health plan with any benefit or coverage questions.      Please bring the following with you to your appointment:    (1) Any X-Rays, CTs or MRIs which have been performed.  Contact the facility where they were done to arrange for  prior to your scheduled appointment.    (2) List of current medications  (3) This referral request   (4) Any documents/labs given to you for this referral                  Your next 10 appointments already scheduled     Mar 31, 2017  9:00 AM CDT   New Visit with JUANITA Ga CNP   [facility] ([place] [facility])    [address] [address] [address] [phone]              Who to contact     If you have questions or need follow up information about today's clinic visit or your schedule please contact [facility] directly at [phone].  Normal or non-critical lab and imaging results will be communicated to you by MyChart, letter or phone within 4 business days after the clinic has received the results. If you do not hear  from us within 7 days, please contact the clinic through [employer] or phone. If you have a critical or abnormal lab result, we will notify you by phone as soon as possible.  Submit refill requests through [employer] or call your pharmacy and they will forward the refill request to us. Please allow 3 business days for your refill to be completed.          Additional Information About Your Visit        [employer] Information     [employer] gives you secure access to your electronic health record. If you see a primary care provider, you can also send messages to your care team and make appointments. If you have questions, please call your primary care clinic.  If you do not have a primary care provider, please call [phone] and they will assist you.        Care EveryWhere ID     This is your Care EveryWhere ID. This could be used by other organizations to access your [place] medical records  [id number]        Your Vitals Were     Pulse Temperature BMI (Body Mass Index)             98 98.9  F (37.2  C) (Oral) 24.95 kg/m2          Blood Pressure from Last 3 Encounters:   03/01/17 146/90   02/27/17 130/80   02/26/17 (!) 138/91    Weight from Last 3 Encounters:   03/01/17 205 lb (93 kg)   02/27/17 205 lb (93 kg)   02/26/17 205 lb (93 kg)              We Performed the Following     NEUROLOGY ADULT REFERRAL          Today's Medication Changes          These changes are accurate as of: 2/27/17 11:59 PM.  If you have any questions, ask your nurse or doctor.               Start taking these medicines.        Dose/Directions    methocarbamol 750 MG tablet   Commonly known as:  ROBAXIN   Used for:  Muscle ache   Started by:  Nanette Haywood PA-C        Dose:  750 mg   Take 1 tablet (750 mg) by mouth 3 times daily as needed for muscle spasms   Quantity:  60 tablet   Refills:  1         These medicines have changed or have updated prescriptions.        Dose/Directions    * oxyCODONE 5 MG capsule   Commonly known as:   OXY-IR   This may have changed:    - Another medication with the same name was changed. Make sure you understand how and when to take each.  - Another medication with the same name was removed. Continue taking this medication, and follow the directions you see here.   Changed by:  Nay Simmons NP        Dose:  5 mg   Take 5 mg by mouth every 6 hours as needed for moderate to severe pain Reported on 2/26/2017   Refills:  0       * oxyCODONE 10 MG IR tablet   Commonly known as:  ROXICODONE   This may have changed:    - Another medication with the same name was changed. Make sure you understand how and when to take each.  - Another medication with the same name was removed. Continue taking this medication, and follow the directions you see here.   Used for:  Chronic pain syndrome   Changed by:  Aiden Resendez PA        Dose:  10 mg   Take 1 tablet (10 mg) by mouth every 8 hours as needed for moderate to severe pain   Quantity:  21 tablet   Refills:  0       * oxyCODONE 10 MG IR tablet   Commonly known as:  ROXICODONE   This may have changed:    - Another medication with the same name was changed. Make sure you understand how and when to take each.  - Another medication with the same name was removed. Continue taking this medication, and follow the directions you see here.   Used for:  Other chronic pain   Changed by:  Aiden Resendez PA        Dose:  10 mg   Take 1 tablet (10 mg) by mouth 2 times daily for 14 days   Quantity:  28 tablet   Refills:  0       * oxyCODONE 10 MG IR tablet   Commonly known as:  ROXICODONE   This may have changed:    - when to take this  - Another medication with the same name was removed. Continue taking this medication, and follow the directions you see here.   Used for:  Chronic back pain, unspecified back location, unspecified back pain laterality   Changed by:  Nanette Haywood PA-C        Dose:  10 mg   Take 1 tablet (10 mg) by mouth every 8 hours  as needed for moderate to severe pain   Quantity:  6 tablet   Refills:  0       * oxyCODONE 5 MG IR tablet   Commonly known as:  ROXICODONE   This may have changed:    - Another medication with the same name was changed. Make sure you understand how and when to take each.  - Another medication with the same name was removed. Continue taking this medication, and follow the directions you see here.   Used for:  Other chronic pain   Changed by:  Aiden Resendez PA        Dose:  5 mg   Start taking on:  3/7/2017   Take 1 tablet (5 mg) by mouth 2 times daily for 14 days maximum 2 tablet(s) per day   Quantity:  28 tablet   Refills:  0       * Notice:  This list has 5 medication(s) that are the same as other medications prescribed for you. Read the directions carefully, and ask your doctor or other care provider to review them with you.         Where to get your medicines      These medications were sent to [id number] - [place] - [address]  [address] [facility] [address]     Phone:  [phone]     methocarbamol 750 MG tablet         Some of these will need a paper prescription and others can be bought over the counter.  Ask your nurse if you have questions.     Bring a paper prescription for each of these medications     oxyCODONE 10 MG IR tablet                Primary Care Provider Office Phone # Fax #    JASON Rice [phone]        URGENT CARE [place] [address] [facility] [address]        Thank you!     Thank you for choosing [facility]  for your care. Our goal is always to provide you with excellent care. Hearing back from our patients is one way we can continue to improve our services. Please take a few minutes to complete the written survey that you may receive in the mail after your visit with us. Thank you!             Your Updated Medication List - Protect others around you: Learn how to safely use, store and  throw away your medicines at www.disposemymeds.org.          This list is accurate as of: 2/27/17 11:59 PM.  Always use your most recent med list.                   Brand Name Dispense Instructions for use    busPIRone 15 MG tablet    BUSPAR    90 tablet    O.5 tabs PO BID for 3 days then 0.5 tab qam and 1 tab qhs for 3 days then 1 tab bid for 3 days.  If still sympotmatic,  may increase to 1 tab QAM and 1.5 tabs at bedtime for 3 days, then 1.5 tabs BID for 3 days, then 1.5 tabs q am and 2 tabs QHS for 3 days, then 2 tabs BID.       * clonazePAM 1 MG tablet    klonoPIN    4 tablet    Take 0.5-1 tablets (0.5-1 mg) by mouth 2 times daily as needed for anxiety       * clonazePAM 1 MG tablet    klonoPIN    4 tablet    Take 0.5-1 tablets (0.5-1 mg) by mouth 2 times daily as needed for anxiety       cyclobenzaprine 5 MG tablet    FLEXERIL     Take 5 mg by mouth Reported on 3/1/2017       DULoxetine 30 MG EC capsule    CYMBALTA    60 capsule    1 tab daily for 1 week then 1 tab BID       FLUoxetine 20 MG capsule    PROzac    90 capsule    Take 3 capsules (60 mg) by mouth daily       folic acid 1 MG tablet    FOLVITE     Reported on 3/1/2017       HUMIRA 40 MG/0.8ML prefilled syringe kit   Generic drug:  adalimumab      Inject 40 mg Subcutaneous Reported on 2/21/2017       hydrOXYzine 25 MG tablet    ATARAX    90 tablet    Take 1-2 tablets (25-50 mg) by mouth every 6 hours as needed for anxiety       IBUPROFEN PO      Take 800 mg by mouth every 6 hours as needed for moderate pain Reported on 2/27/2017       methocarbamol 750 MG tablet    ROBAXIN    60 tablet    Take 1 tablet (750 mg) by mouth 3 times daily as needed for muscle spasms       methotrexate 2.5 MG tablet CHEMO      6 tablets Reported on 2/21/2017       methylPREDNISolone 4 MG tablet    MEDROL DOSEPAK    21 tablet    Follow package instructions       nicotine polacrilex 2 MG gum    NICORETTE         * oxyCODONE 5 MG capsule    OXY-IR     Take 5 mg by mouth every  6 hours as needed for moderate to severe pain Reported on 2/26/2017       * oxyCODONE 10 MG IR tablet    ROXICODONE    21 tablet    Take 1 tablet (10 mg) by mouth every 8 hours as needed for moderate to severe pain       * oxyCODONE 10 MG IR tablet    ROXICODONE    28 tablet    Take 1 tablet (10 mg) by mouth 2 times daily for 14 days       * oxyCODONE 10 MG IR tablet    ROXICODONE    6 tablet    Take 1 tablet (10 mg) by mouth every 8 hours as needed for moderate to severe pain       * oxyCODONE 5 MG IR tablet   Start taking on:  3/7/2017    ROXICODONE    28 tablet    Take 1 tablet (5 mg) by mouth 2 times daily for 14 days maximum 2 tablet(s) per day       tamsulosin 0.4 MG capsule    FLOMAX    30 capsule    Take 1 capsule (0.4 mg) by mouth daily       * Notice:  This list has 7 medication(s) that are the same as other medications prescribed for you. Read the directions carefully, and ask your doctor or other care provider to review them with you.

## 2017-02-27 NOTE — PROGRESS NOTES
SUBJECTIVE:                                                    Jailene Breen is a 51 year old male who presents to clinic today for the following health issues:      Concern - ER follow up      Onset:     Description:    blacked out at work and was at [facility] in the Er this morning   Patient reports that he had echo and EKG and his heart test were normal.   Feeling shaky , right side of body hurts , patient is requesting more pain medications.    Previous history of similar problem:   Yes          Problem list and histories reviewed & adjusted, as indicated.  Additional history: as documented    Patient Active Problem List   Diagnosis     CARDIOVASCULAR SCREENING; LDL GOAL LESS THAN 160     Anxiety     Overweight (BMI 25.0-29.9)     Back pain     Tear meniscus knee, right, initial encounter     Rheumatoid arthritis involving multiple sites, unspecified rheumatoid factor presence (H)     Chronic pain     Right-sided thoracic back pain, unspecified chronicity     JEFF (generalized anxiety disorder)     Panic attack     Past Surgical History   Procedure Laterality Date     Orthopedic surgery       Rt knee X 2     Orthopedic surgery       Lt foot     Arthroscopy knee Right 9/22/2016     Procedure: ARTHROSCOPY KNEE;  Surgeon: Fredo Hughes MD;  Location: [facility] OR     Inject paravertebral facet joint lumbar / sacral first Right 11/25/2016     Procedure: INJECT PARAVERTEBRAL FACET JOINT LUMBAR / SACRAL FIRST;  Surgeon: Doretha Magallanes MD;  Location: [facility] OR       Social History   Substance Use Topics     Smoking status: Never Smoker     Smokeless tobacco: Current User     Types: Chew      Comment: Chew     Alcohol use No      Comment: none     History reviewed. No pertinent family history.      Current Outpatient Prescriptions   Medication Sig Dispense Refill     nicotine polacrilex (NICORETTE) 2 MG gum        busPIRone (BUSPAR) 15 MG tablet O.5 tabs PO BID for 3 days then 0.5 tab qam and 1 tab qhs for  3 days then 1 tab bid for 3 days.  If still sympotmatic,  may increase to 1 tab QAM and 1.5 tabs at bedtime for 3 days, then 1.5 tabs BID for 3 days, then 1.5 tabs q am and 2 tabs QHS for 3 days, then 2 tabs BID. 90 tablet 1     cyclobenzaprine (FLEXERIL) 5 MG tablet Take 5 mg by mouth       clonazePAM (KLONOPIN) 1 MG tablet Take 0.5-1 tablets (0.5-1 mg) by mouth 2 times daily as needed for anxiety 4 tablet 0     DULoxetine (CYMBALTA) 30 MG EC capsule 1 tab daily for 1 week then 1 tab BID 60 capsule 3     oxyCODONE (OXY-IR) 5 MG capsule Take 5 mg by mouth every 6 hours as needed for moderate to severe pain Reported on 2/26/2017       hydrOXYzine (ATARAX) 25 MG tablet Take 1-2 tablets (25-50 mg) by mouth every 6 hours as needed for anxiety 90 tablet 3     methotrexate 2.5 MG tablet 6 tablets Reported on 2/21/2017       adalimumab (HUMIRA) 40 MG/0.8ML prefilled syringe kit Inject 40 mg Subcutaneous Reported on 2/21/2017       folic acid (FOLVITE) 1 MG tablet        clonazePAM (KLONOPIN) 1 MG tablet Take 0.5-1 tablets (0.5-1 mg) by mouth 2 times daily as needed for anxiety (Patient not taking: Reported on 2/27/2017) 4 tablet 0     oxyCODONE (ROXICODONE) 10 MG IR tablet Take 1 tablet (10 mg) by mouth 2 times daily for 14 days (Patient not taking: Reported on 2/27/2017) 28 tablet 0     [START ON 3/7/2017] oxyCODONE (ROXICODONE) 5 MG IR tablet Take 1 tablet (5 mg) by mouth 2 times daily for 14 days maximum 2 tablet(s) per day (Patient not taking: Reported on 2/26/2017) 28 tablet 0     oxyCODONE (ROXICODONE) 10 MG IR tablet Take 1 tablet (10 mg) by mouth every 8 hours as needed for moderate to severe pain (Patient not taking: Reported on 2/27/2017) 21 tablet 0     oxyCODONE (ROXICODONE) 10 MG IR tablet Take 1 tablet (10 mg) by mouth every 6 hours as needed for moderate to severe pain (Patient not taking: Reported on 2/27/2017) 28 tablet 0     tamsulosin (FLOMAX) 0.4 MG capsule Take 1 capsule (0.4 mg) by mouth daily  (Patient not taking: Reported on 2/26/2017) 30 capsule 3     oxyCODONE (OXYCONTIN) 10 MG 12 hr tablet 1 tab q 6 hours daily for 1 week (Patient not taking: Reported on 2/27/2017) 28 tablet 0     oxyCODONE (OXYCONTIN) 10 MG 12 hr tablet 1 tab 3 times daily for one week. (Patient not taking: Reported on 2/27/2017) 21 tablet 0     oxyCODONE (OXYCONTIN) 10 MG 12 hr tablet Take 1 tablet (10 mg) by mouth every 12 hours as needed for moderate to severe pain maximum 2 tablet(s) per day (Patient not taking: Reported on 2/27/2017) 14 tablet 0     oxyCODONE (OXYCONTIN) 10 MG 12 hr tablet Take 1 tablet (10 mg) by mouth daily (Patient not taking: Reported on 2/27/2017) 7 tablet 0     FLUoxetine (PROZAC) 20 MG capsule Take 3 capsules (60 mg) by mouth daily 90 capsule 3     methylPREDNISolone (MEDROL DOSEPAK) 4 MG tablet Follow package instructions (Patient not taking: Reported on 2/26/2017) 21 tablet 0     IBUPROFEN PO Take 800 mg by mouth every 6 hours as needed for moderate pain Reported on 2/27/2017       Problem list, Medication list, Allergies, and Medical/Social/Surgical histories reviewed in [facility] and updated as appropriate.    ROS:  Constitutional, HEENT, cardiovascular, pulmonary, gi and gu systems are negative, except as otherwise noted.    OBJECTIVE:                                                    /80  Pulse 98  Temp 98.9  F (37.2  C) (Oral)  Wt 205 lb (93 kg)  BMI 24.95 kg/m2  Body mass index is 24.95 kg/(m^2).  GENERAL: anxious  EYES: Eyes grossly normal to inspection,  conjunctivae and sclerae normal  HENT: normal cephalic/atraumatic, face is symmetrical,   RESP: no difficulty breathing, no use of accessory muscles observed.   CV: no peripheral edema and color normal, no parasternal heaves visualized.   MS: no gross musculoskeletal defects noted, no edema  SKIN: no suspicious lesions or rashes  NEURO: Normal strength and tone, mentation intact and speech normal  PSYCH: mentation appears normal, affect  normal/bright      Diagnostic Test Results:  none      ASSESSMENT/PLAN:                                                        ICD-10-CM    1. Syncope, unspecified syncope type R55 NEUROLOGY ADULT REFERRAL   2. Muscle ache M79.1 methocarbamol (ROBAXIN) 750 MG tablet   3. Chronic back pain, unspecified back location, unspecified back pain laterality M54.9 oxyCODONE (ROXICODONE) 10 MG IR tablet    G89.29 DISCONTINUED: oxyCODONE (OXYCONTIN) 10 MG 12 hr tablet   1. Patient will see his rheumatologist to discuss his current medications. He will continue RA medications  for now. I suspect that patient faints either form anxiety or possible he might be taking too many pain medications.  Patient was referred to pain clinic and psychiatrist many time, but he does not go.   Patient requested more Oxy today, saying that he needs higher doses and more frequently due to his recent fall that causes right side of the body pain.   Patient was given 6 tablets of  Roxicodone   He reported that he has appointment with psychiatrist in a couple of weeks.  Patient was also referred to neurology to rule out neurologic cause for recurrent syncopy.         Nanette Haywood PA-C  [facility]

## 2017-03-01 ENCOUNTER — OFFICE VISIT (OUTPATIENT)
Dept: FAMILY MEDICINE | Facility: CLINIC | Age: 52
End: 2017-03-01
Payer: COMMERCIAL

## 2017-03-01 VITALS
WEIGHT: 205 LBS | DIASTOLIC BLOOD PRESSURE: 90 MMHG | BODY MASS INDEX: 24.95 KG/M2 | TEMPERATURE: 98.8 F | OXYGEN SATURATION: 100 % | HEART RATE: 97 BPM | SYSTOLIC BLOOD PRESSURE: 146 MMHG

## 2017-03-01 DIAGNOSIS — F41.9 ANXIETY: Primary | ICD-10-CM

## 2017-03-01 DIAGNOSIS — G89.4 CHRONIC PAIN SYNDROME: ICD-10-CM

## 2017-03-01 PROBLEM — M45.9 ANKYLOSING SPONDYLITIS (H): Status: ACTIVE | Noted: 2017-03-01

## 2017-03-01 PROCEDURE — 99213 OFFICE O/P EST LOW 20 MIN: CPT | Performed by: PHYSICIAN ASSISTANT

## 2017-03-01 RX ORDER — OXYCODONE AND ACETAMINOPHEN 5; 325 MG/1; MG/1
TABLET ORAL
Qty: 21 TABLET | Refills: 0 | Status: SHIPPED | OUTPATIENT
Start: 2017-03-07 | End: 2017-06-06

## 2017-03-01 NOTE — PROGRESS NOTES
"  SUBJECTIVE:                                                    Jailene Breen is a 51 year old male who presents to clinic today for the following health issues:      Concern - Panic Attack      Onset: 1 day ago     Description:   Recently lost job this morning and is having a panic attack     Intensity: moderate    Progression of Symptoms:  worsening    Accompanying Signs & Symptoms:  Heart racing, weak knees, shaky , headache        Previous history of similar problem:   yes         Therapies Tried and outcome: atarax, buspar   Denies SI/HI            Allergies   Allergen Reactions     Hydrocodone Itching     Remeron Soltab Other (See Comments)     \"Blacked out\" for one week       Past Medical History   Diagnosis Date     Ankylosing spondylitis (H) 3/1/2017     Anxiety      Arthritis      back, knees     Back pain      possible drug seeking behavior in the past.      Panic attacks          Current Outpatient Prescriptions on File Prior to Visit:  methocarbamol (ROBAXIN) 750 MG tablet Take 1 tablet (750 mg) by mouth 3 times daily as needed for muscle spasms   nicotine polacrilex (NICORETTE) 2 MG gum    busPIRone (BUSPAR) 15 MG tablet O.5 tabs PO BID for 3 days then 0.5 tab qam and 1 tab qhs for 3 days then 1 tab bid for 3 days.  If still sympotmatic,  may increase to 1 tab QAM and 1.5 tabs at bedtime for 3 days, then 1.5 tabs BID for 3 days, then 1.5 tabs q am and 2 tabs QHS for 3 days, then 2 tabs BID.   oxyCODONE (OXY-IR) 5 MG capsule Take 5 mg by mouth every 6 hours as needed for moderate to severe pain Reported on 2/26/2017   hydrOXYzine (ATARAX) 25 MG tablet Take 1-2 tablets (25-50 mg) by mouth every 6 hours as needed for anxiety   methotrexate 2.5 MG tablet 6 tablets Reported on 2/21/2017   adalimumab (HUMIRA) 40 MG/0.8ML prefilled syringe kit Inject 40 mg Subcutaneous Reported on 2/21/2017   folic acid (FOLVITE) 1 MG tablet Reported on 3/1/2017   IBUPROFEN PO Take 800 mg by mouth every 6 hours as needed " for moderate pain Reported on 2/27/2017   oxyCODONE (ROXICODONE) 10 MG IR tablet Take 1 tablet (10 mg) by mouth every 8 hours as needed for moderate to severe pain (Patient not taking: Reported on 3/1/2017)   clonazePAM (KLONOPIN) 1 MG tablet Take 0.5-1 tablets (0.5-1 mg) by mouth 2 times daily as needed for anxiety (Patient not taking: Reported on 2/27/2017)   oxyCODONE (ROXICODONE) 10 MG IR tablet Take 1 tablet (10 mg) by mouth 2 times daily for 14 days (Patient not taking: Reported on 2/27/2017)   [START ON 3/7/2017] oxyCODONE (ROXICODONE) 5 MG IR tablet Take 1 tablet (5 mg) by mouth 2 times daily for 14 days maximum 2 tablet(s) per day (Patient not taking: Reported on 2/26/2017)   cyclobenzaprine (FLEXERIL) 5 MG tablet Take 5 mg by mouth Reported on 3/1/2017   clonazePAM (KLONOPIN) 1 MG tablet Take 0.5-1 tablets (0.5-1 mg) by mouth 2 times daily as needed for anxiety (Patient not taking: Reported on 3/1/2017)   oxyCODONE (ROXICODONE) 10 MG IR tablet Take 1 tablet (10 mg) by mouth every 8 hours as needed for moderate to severe pain (Patient not taking: Reported on 2/27/2017)   tamsulosin (FLOMAX) 0.4 MG capsule Take 1 capsule (0.4 mg) by mouth daily (Patient not taking: Reported on 2/26/2017)   DULoxetine (CYMBALTA) 30 MG EC capsule 1 tab daily for 1 week then 1 tab BID (Patient not taking: Reported on 3/1/2017)   FLUoxetine (PROZAC) 20 MG capsule Take 3 capsules (60 mg) by mouth daily   methylPREDNISolone (MEDROL DOSEPAK) 4 MG tablet Follow package instructions (Patient not taking: Reported on 2/26/2017)     No current facility-administered medications on file prior to visit.     Social History   Substance Use Topics     Smoking status: Never Smoker     Smokeless tobacco: Current User     Types: Chew      Comment: Chew     Alcohol use No      Comment: none       ROS:   MUSC: hx chronic back pain  RHEUM: hx RA  PSYCh hx anxiety    OBJECTIVE:  /90  Pulse 97  Temp 98.8  F (37.1  C) (Oral)  Wt 205 lb (93 kg)   SpO2 100%  BMI 24.95 kg/m2   General:   awake, alert, and cooperative.  NAD.   Head: Normocephalic, atraumatic.  Eyes: Conjunctiva clear,   Neuro: Alert and oriented - normal speech.  PSYCH- normal speech, teary, anxious appearing    ASSESSMENT:    ICD-10-CM    1. Anxiety F41.9    2. Chronic pain syndrome G89.4 oxyCODONE-acetaminophen (PERCOCET) 5-325 MG per tablet       PLAN: I had extended conversation w/pt. He is here requesting benzos.  Per review of our chart and care everywhere, going back to 2008, patient has a hx of cyclical patterns of pain, escalating quickly to very high doses of opiates, generally managed by pain management for a time, most recently by [facility] pain (up to 80-90 mg oxy daily)  who d/c opiates due to violation of pain agreement. He was prev (~2011) on high dose fentanyl from [facility], but I cannot see the actual notes in care everwhere to see how that relationship ended. He has been in twice in past week req opiates early which he attributes to his recent fall.  . These cycles are coupled with cycles of anxiety tx'd w/benzos and referrals to psychiatric care. Here in recent months he has essentially been seen every other  appt for pain, then anxiety, then pain etc. There is a illustrative note from 4/5/15 at [facility] where drug dependency programs were advised and several patient complaints like job loss and being unable to run, which he mentions freq at current and recent visit are described.   He is reluctant to try new anxiolytics given recent syncope.  I offered numbers for tx programs but pt declined.  I declined benzos today. He states he just needs to complete his opiate taper than he would be off all meds. I said I hope so but that given his hx he is likely going to need a more structured program for recovery.  I stated it's possible he is correct and he does not need a program, but it's also possible that the several providers who have advised him he does have a problem are also correct.  He  is due 3/7/16 for final rx of taper. Original rx was for 2 weeks but in light of recent findings, I am going to increase the pace of the taper given likelihood of tx failure.  . Original Rx was destroyed.       Advised about symptoms which might herald more serious problems.

## 2017-03-01 NOTE — MR AVS SNAPSHOT
After Visit Summary   3/1/2017    Jailene Breen    MRN: [id number]           Patient Information     Date Of Birth          1965        Visit Information        Provider Department      3/1/2017 9:00 AM Aiden Resendez PA [facility]        Today's Diagnoses     Anxiety    -  1    Chronic pain syndrome           Follow-ups after your visit        Follow-up notes from your care team     Return if symptoms worsen or fail to improve.      Your next 10 appointments already scheduled     Mar 31, 2017  9:00 AM CDT   New Visit with JUANITA Ga CNP   [facility])    [address] [facility] [address] [phone]              Who to contact     If you have questions or need follow up information about today's clinic visit or your schedule please contact [facility] directly at [phone].  Normal or non-critical lab and imaging results will be communicated to you by MyChart, letter or phone within 4 business days after the clinic has received the results. If you do not hear from us within 7 days, please contact the clinic through [employer] or phone. If you have a critical or abnormal lab result, we will notify you by phone as soon as possible.  Submit refill requests through [employer] or call your pharmacy and they will forward the refill request to us. Please allow 3 business days for your refill to be completed.          Additional Information About Your Visit        MyChart Information     [employer] gives you secure access to your electronic health record. If you see a primary care provider, you can also send messages to your care team and make appointments. If you have questions, please call your primary care clinic.  If you do not have a primary care provider, please call [phone] and they will assist you.        Care EveryWhere ID     This is your Care EveryWhere ID. This  could be used by other organizations to access your [facility] medical records  [id number]        Your Vitals Were     Pulse Temperature Pulse Oximetry BMI (Body Mass Index)          97 98.8  F (37.1  C) (Oral) 100% 24.95 kg/m2         Blood Pressure from Last 3 Encounters:   03/01/17 146/90   02/27/17 130/80   02/26/17 (!) 138/91    Weight from Last 3 Encounters:   03/01/17 205 lb (93 kg)   02/27/17 205 lb (93 kg)   02/26/17 205 lb (93 kg)              Today, you had the following     No orders found for display         Today's Medication Changes          These changes are accurate as of: 3/1/17 12:04 PM.  If you have any questions, ask your nurse or doctor.               Start taking these medicines.        Dose/Directions    oxyCODONE-acetaminophen 5-325 MG per tablet   Commonly known as:  PERCOCET   Used for:  Chronic pain syndrome   Started by:  Aiden Resendez PA        Start taking on:  3/7/2017   1 tab BID for 7 days then 1 tab qd   Quantity:  21 tablet   Refills:  0            Where to get your medicines      Some of these will need a paper prescription and others can be bought over the counter.  Ask your nurse if you have questions.     Bring a paper prescription for each of these medications     oxyCODONE-acetaminophen 5-325 MG per tablet                Primary Care Provider Office Phone # Fax #    JASON Rice [phone]       [facility] [id number] [place] [id number]        Thank you!     Thank you for choosing [facility]  for your care. Our goal is always to provide you with excellent care. Hearing back from our patients is one way we can continue to improve our services. Please take a few minutes to complete the written survey that you may receive in the mail after your visit with us. Thank you!             Your Updated Medication List - Protect others around you: Learn how to safely use, store and throw away your  medicines at www.disposemymeds.org.          This list is accurate as of: 3/1/17 12:04 PM.  Always use your most recent med list.                   Brand Name Dispense Instructions for use    busPIRone 15 MG tablet    BUSPAR    90 tablet    O.5 tabs PO BID for 3 days then 0.5 tab qam and 1 tab qhs for 3 days then 1 tab bid for 3 days.  If still sympotmatic,  may increase to 1 tab QAM and 1.5 tabs at bedtime for 3 days, then 1.5 tabs BID for 3 days, then 1.5 tabs q am and 2 tabs QHS for 3 days, then 2 tabs BID.       * clonazePAM 1 MG tablet    klonoPIN    4 tablet    Take 0.5-1 tablets (0.5-1 mg) by mouth 2 times daily as needed for anxiety       * clonazePAM 1 MG tablet    klonoPIN    4 tablet    Take 0.5-1 tablets (0.5-1 mg) by mouth 2 times daily as needed for anxiety       cyclobenzaprine 5 MG tablet    FLEXERIL     Take 5 mg by mouth Reported on 3/1/2017       DULoxetine 30 MG EC capsule    CYMBALTA    60 capsule    1 tab daily for 1 week then 1 tab BID       FLUoxetine 20 MG capsule    PROzac    90 capsule    Take 3 capsules (60 mg) by mouth daily       folic acid 1 MG tablet    FOLVITE     Reported on 3/1/2017       HUMIRA 40 MG/0.8ML prefilled syringe kit   Generic drug:  adalimumab      Inject 40 mg Subcutaneous Reported on 2/21/2017       hydrOXYzine 25 MG tablet    ATARAX    90 tablet    Take 1-2 tablets (25-50 mg) by mouth every 6 hours as needed for anxiety       IBUPROFEN PO      Take 800 mg by mouth every 6 hours as needed for moderate pain Reported on 2/27/2017       methocarbamol 750 MG tablet    ROBAXIN    60 tablet    Take 1 tablet (750 mg) by mouth 3 times daily as needed for muscle spasms       methotrexate 2.5 MG tablet CHEMO      6 tablets Reported on 2/21/2017       methylPREDNISolone 4 MG tablet    MEDROL DOSEPAK    21 tablet    Follow package instructions       nicotine polacrilex 2 MG gum    NICORETTE         * oxyCODONE 5 MG capsule    OXY-IR     Take 5 mg by mouth every 6 hours as needed  for moderate to severe pain Reported on 2/26/2017       * oxyCODONE 10 MG IR tablet    ROXICODONE    21 tablet    Take 1 tablet (10 mg) by mouth every 8 hours as needed for moderate to severe pain       * oxyCODONE 10 MG IR tablet    ROXICODONE    28 tablet    Take 1 tablet (10 mg) by mouth 2 times daily for 14 days       * oxyCODONE 10 MG IR tablet    ROXICODONE    6 tablet    Take 1 tablet (10 mg) by mouth every 8 hours as needed for moderate to severe pain       * oxyCODONE 5 MG IR tablet   Start taking on:  3/7/2017    ROXICODONE    28 tablet    Take 1 tablet (5 mg) by mouth 2 times daily for 14 days maximum 2 tablet(s) per day       oxyCODONE-acetaminophen 5-325 MG per tablet   Start taking on:  3/7/2017    PERCOCET    21 tablet    1 tab BID for 7 days then 1 tab qd       tamsulosin 0.4 MG capsule    FLOMAX    30 capsule    Take 1 capsule (0.4 mg) by mouth daily       * Notice:  This list has 7 medication(s) that are the same as other medications prescribed for you. Read the directions carefully, and ask your doctor or other care provider to review them with you.

## 2017-03-10 ENCOUNTER — TELEPHONE (OUTPATIENT)
Dept: FAMILY MEDICINE | Facility: CLINIC | Age: 52
End: 2017-03-10

## 2017-03-10 DIAGNOSIS — F41.9 ANXIETY: Primary | ICD-10-CM

## 2017-03-10 DIAGNOSIS — G89.29 OTHER CHRONIC PAIN: ICD-10-CM

## 2017-03-10 RX ORDER — NORTRIPTYLINE HCL 10 MG
CAPSULE ORAL
Qty: 90 CAPSULE | Refills: 0 | Status: SHIPPED | OUTPATIENT
Start: 2017-03-10 | End: 2017-04-11

## 2017-03-10 NOTE — TELEPHONE ENCOUNTER
I have sent a rx for a titration of nortriptyline to the pharmacy.  We will likely need to increase the dose eventually past the 30 mg the titration stops at, but I think it's wise to increase slowly.    Ruperto Resendez PA-C

## 2017-03-10 NOTE — TELEPHONE ENCOUNTER
Patient has had back pain since October 2016.  Patient has an appointment with pain clinic on the 31st.  Patient is currently taking 2 tablets of percocet a day, Ibuprofen 4 tablets every 6 hours, and roboxin, but the pain is not helping. Patient still has percocet tablet remaining from earlier this week.  Pain is constant and intense. Patient has seen psychologist for anxiety.  Psychologist didn't prescribe any medication.  Patient was given clonazepam to last him till that appointment, but the clonazepam is gone.    RN huddled with Ruperto Resendez who offered Toradol injection, however no opioids will be prescribed today.  Patient declined Toradol injections.  Stated that he has had Toradol in the past and they were no help at all.    Patient would be willing to try a pain medication if it is something he has not had in the past.  Patient is also wondering if there is anything that can help his anxiety.    Routing to provider. Please advise.  Samantha Franklin RN

## 2017-03-10 NOTE — TELEPHONE ENCOUNTER
We could try nortriptyline which can be helpful for pain and anxiety. I am not sure if he has ever been on that before.    Ruperto Resendez PA-C

## 2017-03-10 NOTE — TELEPHONE ENCOUNTER
..Reason for Call:    pain meds are not working    Detailed comments: is in so much lower rt side back pain, feels like he isn't taking anything; please advise  *states he needs help please  Phone Number Patient can be reached at: Home number on file [phone] (home)    Best Time: anything    Can we leave a detailed message on this number? YES    Call taken on 3/10/2017 at 12:47 PM by Nohemi Sanders

## 2017-03-10 NOTE — TELEPHONE ENCOUNTER
Patient notified of the message below.  Patient would be interested in trying Nortriptyline.    Samantha Franklin RN

## 2017-03-13 NOTE — TELEPHONE ENCOUNTER
Patient notified.  Patient picked up medication on Friday.  Patient has not noticed any change on medication as of yet.  Still having pain and anxiety. Patient is seeing psychiatrist today.      Samantha Franklin RN

## 2017-03-31 ENCOUNTER — OFFICE VISIT (OUTPATIENT)
Dept: PALLIATIVE MEDICINE | Facility: CLINIC | Age: 52
End: 2017-03-31
Payer: COMMERCIAL

## 2017-03-31 ENCOUNTER — TELEPHONE (OUTPATIENT)
Dept: PALLIATIVE MEDICINE | Facility: CLINIC | Age: 52
End: 2017-03-31

## 2017-03-31 VITALS
HEART RATE: 80 BPM | SYSTOLIC BLOOD PRESSURE: 146 MMHG | DIASTOLIC BLOOD PRESSURE: 88 MMHG | HEIGHT: 76 IN | BODY MASS INDEX: 26.55 KG/M2 | WEIGHT: 218 LBS

## 2017-03-31 DIAGNOSIS — M51.369 DDD (DEGENERATIVE DISC DISEASE), LUMBAR: ICD-10-CM

## 2017-03-31 DIAGNOSIS — M45.9 ANKYLOSING SPONDYLITIS (H): ICD-10-CM

## 2017-03-31 DIAGNOSIS — M79.18 MYOFASCIAL PAIN: ICD-10-CM

## 2017-03-31 DIAGNOSIS — Z79.891 ENCOUNTER FOR LONG-TERM OPIATE ANALGESIC USE: ICD-10-CM

## 2017-03-31 DIAGNOSIS — M53.3 SACROILIAC JOINT PAIN: ICD-10-CM

## 2017-03-31 DIAGNOSIS — M47.817 LUMBOSACRAL SPONDYLOSIS WITHOUT MYELOPATHY: Primary | ICD-10-CM

## 2017-03-31 PROCEDURE — 80307 DRUG TEST PRSMV CHEM ANLYZR: CPT | Mod: 90 | Performed by: NURSE PRACTITIONER

## 2017-03-31 PROCEDURE — 99000 SPECIMEN HANDLING OFFICE-LAB: CPT | Performed by: NURSE PRACTITIONER

## 2017-03-31 PROCEDURE — 99244 OFF/OP CNSLTJ NEW/EST MOD 40: CPT | Performed by: NURSE PRACTITIONER

## 2017-03-31 RX ORDER — ORPHENADRINE CITRATE 100 MG/1
100 TABLET, EXTENDED RELEASE ORAL 2 TIMES DAILY PRN
Qty: 60 TABLET | Refills: 1 | Status: SHIPPED | OUTPATIENT
Start: 2017-03-31 | End: 2017-07-28

## 2017-03-31 RX ORDER — PREGABALIN 25 MG/1
CAPSULE ORAL
Qty: 90 CAPSULE | Refills: 0 | Status: SHIPPED | OUTPATIENT
Start: 2017-03-31 | End: 2017-05-15

## 2017-03-31 ASSESSMENT — PAIN SCALES - GENERAL: PAINLEVEL: EXTREME PAIN (9)

## 2017-03-31 NOTE — PATIENT INSTRUCTIONS
PLAN:  1. Schedule an evaluation with Mahad estrada   2. Schedule an evaluation with our physical therapist, Asif Saldana/Cyndee White  3. Medications:   1. Start Lyrica as directed  2. Start orphendrine as directed  3. Start these meds at least 3 days apart  4. I may consider a few tabs of oxycodone per month on bad days once UDS is back  4. Procedures: schedule LMBBs, our office will call you  5. Follow-up with me in 8 weeks.  6. Today-Urine drug screen, last took Oxycodone over a week ago            ----------------------------------------------------------------  Nurse Triage line:  [phone]   Call this number with any questions or concerns. You may leave a detailed message anytime. Calls are typically returned Monday through Friday between 8 AM and 4:30 PM. We usually get back to you within 2 business days depending on the issue/request.       Medication refills:    For non-narcotic medications, call your pharmacy directly to request a refill. The pharmacy will contact the [facility] for authorization. Please allow 3-4 days for these refills to be processed.     For narcotic refills, call the nurse triage line or send a [employer] message. Please contact us 7-10 days before your refill is due. The message MUST include the name of the specific medication(s) requested and how you would like to receive the prescription(s). The options are as follows:    Pain Clinic staff can mail the prescription to your pharmacy. Please tell us the name of the pharmacy.    You may pick the prescription up at the Pain Clinic (tell us the location) or during a clinic visit with your pain provider    Pain Clinic staff can deliver the prescription to the [facility] pharmacy in the clinic building. Please tell us the location.      Scheduling number: [phone].  Call this number to schedule or change appointments.    We believe regular attendance is key to your success in our program.    Any time you are  unable to keep your appointment we ask that you call us at least 24 hours in advance to let us know. This will allow us to offer the appointment time to another patient.

## 2017-03-31 NOTE — PROGRESS NOTES
[facility] Consultation    Date of visit: 3/31/2017    Reason for consultation:    Jailene Breen is a 51 year old male who is seen in consultation today at the request of his provider, Aiden Resendez PA-C re: patient's back pain and RA    Primary Care Provider is Aiden Resendez.  Pain medications are being prescribed by;  He has had 25 prescribers and used 9 different pharmacies in the past 12 months.     Please see the [facility] health questionnaire which the patient completed and reviewed with me in detail.    Chief Complaint:  Right sided low back pain  Chief Complaint   Patient presents with     Pain     Lower right back pain        Pain history:  Jailene Breen is a 51 year old male who first started having problems with pain as follows:     -chronic low back pain  His pain began the beginning of 2016. He had been running for exercise and he began having pain in his low back. Then he began having pain in his hands and he was diagnosed with RA.   His back pain remained. He was referred to many different doctors. He has been seen by [facility] and the [facility] but he could not continue to go there due to cost ([facility]).  He had done the LMBBs there and was going to do lumbar RFA but has not had the RFA done. He has had an SI joint injection which increased his pain for about a week.  He was frustrated when he saw the [facility] and did not return there (I can't find this record).   He is frustrated that he cannot run after a knee surgery.     His current pain is on the right side of the lower back, radiates into the right buttock, and he has intermittent radicular symptoms down the posterolateral aspect of his right leg to the foot.     He has RA and Ankylosing Spondylitis as well.     He sees a rheumatologist in the [facility] system. He is on Humira.     He had a really bad early March, he was laid off of his job and he was  "also tapered off of his pain medications. He states he \"snapped\" and had a mental breakdown in the ER, he is now seeing a psychologist. He was in a crisis center at [facility] for about 8 hours but he did not get admitted.     Pain rating: intensity ranges from 9/10 to 10/10, and Averages 9/10 on a 0-10 scale.    Describes pain as \"sharp, shooting.\"  Pain is constant.    Home self care includes: TENS, heat and ice    Aggravating factors include: standing, sitting    Relieving factors include: running is helpful    Any bowel or bladder incontinence: none    Current pain-related medication treatments include:  -nortriptyline 10-30mg at bedtime (30mg at bedtime, helpful)  -humira 40mg twice monthly(helpful)  -ibuprofen 800mg TID PRN (using 3 times daily-helpful)  -Tylenol 500mg using 1000mg TID (unsure if helpful)  -Maxalt 10mg PRN migraine (helpful for migraine--he does have visual aura)  He used to be on pain medication, oxycodone.       Other pertinent medications:  none    Previous medication treatments included:  OPIATES: Oxycodone (helpful), Fentanyl (100mcg/hr patch helpful), hydrocodone (itching), Tramadol (not helpful)   NSAIDS: ibuprofen (not helpful), Aleve (not helpful)  MUSCLE RELAXANTS: methocarbamol (somewhat helpful), Flexeril (somewhat helpful), tizanidine (unsure if helpful), metaxalone (unsure), SOMA (helpful)  ANTI-MIGRAINE MEDS: Maxalt (helpful for migraine), Imitrex injection (somewhat helpful)  ANTI-DEPRESSANTS: Prozac (helpful), Cymbalta (felt weird on it), nortriptyline (unsure if helpful), Buspar (unsure), Remeron (blacked out)  SLEEP AIDS: Ambien (helpful)  ANTI-CONVULSANTS: gabapentin (felt odd on this medication, unsure of dose), Lyrica (not helpful for 4 months, unsure of dose)  TOPICALS: Lidocaine patches (not helpful), Voltaren gel (not helpful)  Other meds: Tylenol (unsure if helpful)      Other treatments have included:  Jailene Breen has been seen at a pain clinic in the past.  " [facility]  PT: tried, not helpful  Chiropractic: tried, not helpful  Acupuncture: none  TENs Unit: tried, helpful    Injections:   -has had injections at outside clinics  -has had epidural injections for low back pain (one was helpful)  -he has had lumbar medial branch blocks at [facility] and he was going to have lumbar radiofrequency ablation (did not do this due to cost)    Past Medical History:  Past Medical History:   Diagnosis Date     Ankylosing spondylitis (H) 3/1/2017     Anxiety      Arthritis     back, knees     Back pain     possible drug seeking behavior in the past.      Panic attacks      Past Surgical History:  Past Surgical History:   Procedure Laterality Date     ARTHROSCOPY KNEE Right 9/22/2016    Procedure: ARTHROSCOPY KNEE;  Surgeon: Fredo Hughes MD;  Location: [facility] OR     INJECT PARAVERTEBRAL FACET JOINT LUMBAR / SACRAL FIRST Right 11/25/2016    Procedure: INJECT PARAVERTEBRAL FACET JOINT LUMBAR / SACRAL FIRST;  Surgeon: Doretha Magallanes MD;  Location: [facility] OR     ORTHOPEDIC SURGERY      Rt knee X 2     ORTHOPEDIC SURGERY      Lt foot     Medications:  Current Outpatient Prescriptions   Medication Sig Dispense Refill     nortriptyline (PAMELOR) 10 MG capsule Start at 1 tab at bedtime for 3 days, then 2 tabs at bedtime for 3 days, then 3 tabs at bedtime. 90 capsule 0     oxyCODONE-acetaminophen (PERCOCET) 5-325 MG per tablet 1 tab BID for 7 days then 1 tab qd 21 tablet 0     methocarbamol (ROBAXIN) 750 MG tablet Take 1 tablet (750 mg) by mouth 3 times daily as needed for muscle spasms 60 tablet 1     oxyCODONE (ROXICODONE) 10 MG IR tablet Take 1 tablet (10 mg) by mouth every 8 hours as needed for moderate to severe pain (Patient not taking: Reported on 3/1/2017) 6 tablet 0     nicotine polacrilex (NICORETTE) 2 MG gum        busPIRone (BUSPAR) 15 MG tablet O.5 tabs PO BID for 3 days then 0.5 tab qam and 1 tab qhs for 3 days then 1 tab bid for 3 days.  If still  sympotmatic,  may increase to 1 tab QAM and 1.5 tabs at bedtime for 3 days, then 1.5 tabs BID for 3 days, then 1.5 tabs q am and 2 tabs QHS for 3 days, then 2 tabs BID. 90 tablet 1     clonazePAM (KLONOPIN) 1 MG tablet Take 0.5-1 tablets (0.5-1 mg) by mouth 2 times daily as needed for anxiety (Patient not taking: Reported on 2/27/2017) 4 tablet 0     cyclobenzaprine (FLEXERIL) 5 MG tablet Take 5 mg by mouth Reported on 3/1/2017       clonazePAM (KLONOPIN) 1 MG tablet Take 0.5-1 tablets (0.5-1 mg) by mouth 2 times daily as needed for anxiety (Patient not taking: Reported on 3/1/2017) 4 tablet 0     oxyCODONE (ROXICODONE) 10 MG IR tablet Take 1 tablet (10 mg) by mouth every 8 hours as needed for moderate to severe pain (Patient not taking: Reported on 2/27/2017) 21 tablet 0     tamsulosin (FLOMAX) 0.4 MG capsule Take 1 capsule (0.4 mg) by mouth daily (Patient not taking: Reported on 2/26/2017) 30 capsule 3     DULoxetine (CYMBALTA) 30 MG EC capsule 1 tab daily for 1 week then 1 tab BID (Patient not taking: Reported on 3/1/2017) 60 capsule 3     oxyCODONE (OXY-IR) 5 MG capsule Take 5 mg by mouth every 6 hours as needed for moderate to severe pain Reported on 2/26/2017       FLUoxetine (PROZAC) 20 MG capsule Take 3 capsules (60 mg) by mouth daily 90 capsule 3     hydrOXYzine (ATARAX) 25 MG tablet Take 1-2 tablets (25-50 mg) by mouth every 6 hours as needed for anxiety 90 tablet 3     methylPREDNISolone (MEDROL DOSEPAK) 4 MG tablet Follow package instructions (Patient not taking: Reported on 2/26/2017) 21 tablet 0     methotrexate 2.5 MG tablet 6 tablets Reported on 2/21/2017       adalimumab (HUMIRA) 40 MG/0.8ML prefilled syringe kit Inject 40 mg Subcutaneous Reported on 2/21/2017       folic acid (FOLVITE) 1 MG tablet Reported on 3/1/2017       IBUPROFEN PO Take 800 mg by mouth every 6 hours as needed for moderate pain Reported on 2/27/2017       Allergies:     Allergies   Allergen Reactions     Hydrocodone Itching      "Remeron Soltab Other (See Comments)     \"Blacked out\" for one week     Social History:  Home situation: , has 2 children in college  Occupation/Schooling: currently unemployed, worked as a  (unemployed since 3/1/2017)  Tobacco use: none  Alcohol use: none  Drug use: none  History of chemical dependency treatment: none    Family history:  No family history on file.  Family history of headaches: none    Review of Systems:  Skin: negative  Eyes: headaches  Ears/Nose/Throat: tinnitus  Respiratory: No shortness of breath, dyspnea on exertion, cough, or hemoptysis  Cardiovascular: negative  Gastrointestinal: negative  Genitourinary: retention  Musculoskeletal: back pain and joint pain, Ankylosing Spondylitis  Neurologic: negative  Psychiatric: anxiety and depression  Hematologic/Lymphatic/Immunologic: negative  Endocrine: negative    Physical Exam:  Vitals:    03/31/17 0859   BP: 146/88   Pulse: 80   Weight: 98.9 kg (218 lb)   Height: 1.93 m (6' 4\")     Exam:  Constitutional: healthy, alert and no distress  Head: normocephalic. Atraumatic.   Eyes: no redness or jaundice noted   ENT: oropharnx normal.  MMM.  Neck supple.    Cardiovascular: RRR no m/g/r   Respiratory: clear to auscultation A/P. Respirations easy and unlabored. Patient able to speak in full sentences without cough or shortness of breath noted.  Gastrointestinal: soft, non-tender, normoactive bowel sounds   : deferred  Skin: no suspicious lesions or rashes  Psychiatric: mentation appears normal and affect normal/bright    Musculoskeletal exam:  Gait/Station/Posture: Slouches somewhat in the chair  Normal gait. Patient able to rise on her toes and heels. Patient able to perform tandem gait.    Cervical spine:    Flex: 30 degrees   Ext: 30 degrees   Rotation to right: 90 degrees   Rotation to left: 90 degrees       Thoracic spine:  Normal     Lumbar spine:    Flex:  70 degrees   Ext: 30 degrees   Rotation/ext to right: painful    Rotation/ext to " left: painful    SI joints: tenderness   Greater trochanters: non-tender    Myofascial tenderness:  Lower lumbar paraspinals  Straight leg exam: negative  Stevie's maneuver: positive    Neurologic exam:  CN:  Cranial nerves 2-12 are  Grossly normal  Motor:  5/5 UE and LE strength  Reflexes:     Biceps:     R:  2+/4 L: 2+/4   Brachioradialis   R:  2+/4 L: 2+/4      Patella:  R:  2+/4 L: 2+/4   Achilles:  R:  2+/4 L: 2+/4  Other reflexes:  Toes downgoing   Sensory:  (upper and lower extremities):   Light touch: normal    Vibration: normal    Pin prick: normal    Allodynia: absent    Dysethesia: absent    Hyperalgesia: absent     Diagnostic tests:  10/14/2016 lumbar MRI at [facility]  EXAM: MRI OF THE LUMBAR SPINE  CLINICAL INFORMATION: 51-year-old male with sharp pain in the right lower back. Symptoms present for approximately one year. No history of prior lumbar spine surgery.  COMPARISON: Outside lumbar MRI dated 6/14/2016.  TECHNICAL INFORMATION: T1, T2 fast spin echo and STIR sagittal thin sections through the lumbar spine with T1 and T2 fast spin echo axial sections at selected levels. Coronal T2-weighted series.  INTERPRETATION: Normal lumbar lordotic alignment with no scoliosis. No osseous lesion or vertebral fracture. Limited sections of the sacrum show appearance of probable ankylosis of the upper left SI joint.  L5-S1: Moderate disc degeneration, 2 mm retrolisthesis with chronic reactive left-sided L5 small node endplate change. 4 mm right posterior lateral disc protrusion abuts the right S1 root without displacement or compression. No central stenosis. L5 ganglia exit without impingement.   L4-5: Mild disc degeneration and high intensity dorsal annular fissure and bulge contacting the thecal sac without stenosis or neural impingement. Foramen are patent. Mild bilateral facet arthropathy.   L3-4: Normal disc height and hydration. No herniation or neural impingement. Normal facet joints with patent neural foramina.    L2-3: Mild/moderate disc degeneration with broad-based 4 mm AP central bulge/protrusion effacing the thecal sac and abutting the central L3 roots without focal displacement or compression. Foramina are patent. Normal facet joints.   L1-2: Normal disc height and hydration. No herniation or neural impingement. Patent central canal and neural foramina. Normal facet joints.   Sections and lower thoracic spine show moderate T12-L1 disc degeneration with annular bulge, ventral osteophytes and no focal neural impingement. Normal signal intensity in the distal cord/conus medullaris.  3.5 cm left renal cyst.  CONCLUSION:  1. Focal 4 mm right L5-S1 posterior lateral disc protrusion abuts the right S1 root without evidence for focal compression.  2. Broad-based central disc protrusion at L2-3 encroaches on the central L3 roots.  3. Annular fissure/shallow bulge at L4-5 without neural impingement.  4. No inflammatory facet arthropathy, osseous lesion or evidence for infection.  Comment: As compared to previous study earlier in 2016, no significant interval changes identified with no new disc herniation.      Study Result      XR LUMBAR SPINE 2-3 VIEWS 2/21/2017 11:04 AM     COMPARISON: 5/13/2016     HISTORY: Fall         IMPRESSION: Vertebral heights are preserved without evidence of  fracture. Mild disc space loss at L5-S1 unchanged. Mild facet  degenerative changes at the lumbosacral junction.          Other testing (labs, diagnostics):  2/21/2017  Cr. 0.92  Est GFR 87      Screening tools:    PHQ-9 score today is 8. Patient DENIES any suicidal ideation.     DIRE Score for ongoing opioid management is calculated as follows:    Diagnosis = 2    Intractability = 2    Risk: Psych = 2  Chem Hlth = 2  Reliability = 2  Social = 2    Efficacy = 2    Total DIRE Score = 14 (14 or higher predicts good candidate for ongoing opioid management; 13 or lower predicts poor candidate for opioid management)     Assessment:  1. Lumbar  spondylosis, pain is worse with extension and rotation indicating a facetogenic component to pain  2. Lumbar degenerative disc disease  3. SI joint pain  4. Ankylosing spondylitis  5. Myofascial pain  6. Chronic pain syndrome  7. PMHx includes: Panic attacks, back pain, arthritis, anxiety, ankylosing spondylitis  8. PSHx includes: Right knee surgery ×2, left foot surgery right knee arthroscopy        Plan:  Diagnosis reviewed, treatment option addressed, and risk/benefits discussed.  Self-care instructions given.  I am recommending a multidisciplinary treatment plan to help this patient better manage his pain.      1. Physical Therapy: Indicated and ordered  2. Clinical Health Psychologist to address issues of relaxation, behavioral change, coping style, and other factors important to improvement: Indicated and ordered  3. Introductory groups: not ordered  4. Diagnostic Studies: none  5. Medication Management:   1. Start Lyrica as directed  2. Start orphenadrine as directed  3. Start these medications at least 3 days apart  4. I might consider a few tablets of oxycodone per month and bad days once the urine drug screen in his back and as expected.  6. Further procedures recommended: schedule lumbar medial branch blocks, our office will contact patient   7. Acupuncture: none  8. Urine toxicology screen today: yes, patient reports he last took Oxycodone over one week ago   9. Recommendations/follow-up for PCP:  See above  10. Release of information: none  11. Follow up: 8 weeks    Total time spent was 65 minutes, and more than 50% of face to face time was spent in counseling and/or coordination of care regarding principles of multidisciplinary care, medication management, and interventional options      Susana GRANT, SANDRA CNP, FNP  [facility] [place]

## 2017-03-31 NOTE — NURSING NOTE
"Chief Complaint   Patient presents with     Pain     Lower right back pain        Initial /88  Pulse 80  Ht 1.93 m (6' 4\")  Wt 98.9 kg (218 lb)  BMI 26.54 kg/m2 Estimated body mass index is 26.54 kg/(m^2) as calculated from the following:    Height as of this encounter: 1.93 m (6' 4\").    Weight as of this encounter: 98.9 kg (218 lb).  Medication Reconciliation: complete     Nicolasa Krueger MA      "

## 2017-03-31 NOTE — MR AVS SNAPSHOT
After Visit Summary   3/31/2017    Jailene Breen    MRN: [id number]           Patient Information     Date Of Birth          1965        Visit Information        Provider Department      3/31/2017 9:00 AM Susana Pike APRN [facility]        Today's Diagnoses     Lumbosacral spondylosis without myelopathy    -  1    DDD (degenerative disc disease), lumbar        Sacroiliac joint pain        Ankylosing spondylitis (H)        Myofascial pain        Encounter for long-term opiate analgesic use          Care Instructions    PLAN:  1. Schedule an evaluation with Mahad Munoz psychologist   2. Schedule an evaluation with our physical therapist, Asif Saldana/Cyndee White  3. Medications:   1. Start Lyrica as directed  2. Start orphendrine as directed  3. Start these meds at least 3 days apart  4. I may consider a few tabs of oxycodone per month on bad days once UDS is back  4. Procedures: schedule LMBBs, our office will call you  5. Follow-up with me in 8 weeks.  6. Today-Urine drug screen, last took Oxycodone over a week ago            ----------------------------------------------------------------  Nurse Triage line:  [phone]   Call this number with any questions or concerns. You may leave a detailed message anytime. Calls are typically returned Monday through Friday between 8 AM and 4:30 PM. We usually get back to you within 2 business days depending on the issue/request.       Medication refills:    For non-narcotic medications, call your pharmacy directly to request a refill. The pharmacy will contact the [facility] for authorization. Please allow 3-4 days for these refills to be processed.     For narcotic refills, call the nurse triage line or send a [facility] message. Please contact us 7-10 days before your refill is due. The message MUST include the name of the specific medication(s) requested and how you would like to receive the prescription(s).  The options are as follows:    Pain Clinic staff can mail the prescription to your pharmacy. Please tell us the name of the pharmacy.    You may pick the prescription up at the Pain Clinic (tell us the location) or during a clinic visit with your pain provider    Pain Clinic staff can deliver the prescription to the [facility] pharmacy in the clinic building. Please tell us the location.      Scheduling number: [phone].  Call this number to schedule or change appointments.    We believe regular attendance is key to your success in our program.    Any time you are unable to keep your appointment we ask that you call us at least 24 hours in advance to let us know. This will allow us to offer the appointment time to another patient.             Follow-ups after your visit        Additional Services     PAIN INJECTION EVAL/TREAT/FOLLOW UP           PAIN PHD EVAL/TREAT/FOLLOW UP           PAIN PT EVAL AND TREAT                 Future tests that were ordered for you today     Open Future Orders        Priority Expected Expires Ordered    Drug Screen Comprehensive , Urine with Reported Meds (Pain Care Package) Routine  3/31/2018 3/31/2017            Who to contact     If you have questions or need follow up information about today's clinic visit or your schedule please contact [facility] directly at [phone].  Normal or non-critical lab and imaging results will be communicated to you by MyChart, letter or phone within 4 business days after the clinic has received the results. If you do not hear from us within 7 days, please contact the clinic through MyChart or phone. If you have a critical or abnormal lab result, we will notify you by phone as soon as possible.  Submit refill requests through [employer] or call your pharmacy and they will forward the refill request to us. Please allow 3 business days for your refill to be completed.          Additional Information About Your Visit        MyChart  "Information     [facility] gives you secure access to your electronic health record. If you see a primary care provider, you can also send messages to your care team and make appointments. If you have questions, please call your primary care clinic.  If you do not have a primary care provider, please call [phone] and they will assist you.        Care EveryWhere ID     This is your Care EveryWhere ID. This could be used by other organizations to access your [place] medical records  [id number]        Your Vitals Were     Pulse Height BMI (Body Mass Index)             80 1.93 m (6' 4\") 26.54 kg/m2          Blood Pressure from Last 3 Encounters:   03/31/17 146/88   03/01/17 146/90   02/27/17 130/80    Weight from Last 3 Encounters:   03/31/17 98.9 kg (218 lb)   03/01/17 93 kg (205 lb)   02/27/17 93 kg (205 lb)              We Performed the Following     PAIN INJECTION EVAL/TREAT/FOLLOW UP     PAIN PHD EVAL/TREAT/FOLLOW UP     PAIN PT EVAL AND TREAT          Today's Medication Changes          These changes are accurate as of: 3/31/17 10:20 AM.  If you have any questions, ask your nurse or doctor.               Start taking these medicines.        Dose/Directions    orphenadrine 100 MG 12 hr tablet   Commonly known as:  NORFLEX   Used for:  Lumbosacral spondylosis without myelopathy, DDD (degenerative disc disease), lumbar, Sacroiliac joint pain, Ankylosing spondylitis (H), Myofascial pain        Dose:  100 mg   Take 1 tablet (100 mg) by mouth 2 times daily as needed for muscle spasms or moderate to severe pain   Quantity:  60 tablet   Refills:  1       pregabalin 25 MG capsule   Commonly known as:  LYRICA   Used for:  Lumbosacral spondylosis without myelopathy, DDD (degenerative disc disease), lumbar, Sacroiliac joint pain, Ankylosing spondylitis (H), Myofascial pain        Take 25mg at bedtime for 7 days, then 25mg twice daily for 7 days, then take 25mg in the morning and 50mg at bedtime. If side effects, then " reduce to last tolerable dosage.   Quantity:  90 capsule   Refills:  0            Where to get your medicines      These medications were sent to [id number] - [place] - [address]  [address] [address]     Phone:  [phone]     orphenadrine 100 MG 12 hr tablet         Some of these will need a paper prescription and others can be bought over the counter.  Ask your nurse if you have questions.     Bring a paper prescription for each of these medications     pregabalin 25 MG capsule                Primary Care Provider Office Phone # Fax #    JASON Rice [phone]        [facility] [place] [address] [address] [address]        Thank you!     Thank you for choosing [facility]  for your care. Our goal is always to provide you with excellent care. Hearing back from our patients is one way we can continue to improve our services. Please take a few minutes to complete the written survey that you may receive in the mail after your visit with us. Thank you!             Your Updated Medication List - Protect others around you: Learn how to safely use, store and throw away your medicines at www.disposemymeds.org.          This list is accurate as of: 3/31/17 10:20 AM.  Always use your most recent med list.                   Brand Name Dispense Instructions for use    busPIRone 15 MG tablet    BUSPAR    90 tablet    O.5 tabs PO BID for 3 days then 0.5 tab qam and 1 tab qhs for 3 days then 1 tab bid for 3 days.  If still sympotmatic,  may increase to 1 tab QAM and 1.5 tabs at bedtime for 3 days, then 1.5 tabs BID for 3 days, then 1.5 tabs q am and 2 tabs QHS for 3 days, then 2 tabs BID.       * clonazePAM 1 MG tablet    klonoPIN    4 tablet    Take 0.5-1 tablets (0.5-1 mg) by mouth 2 times daily as needed for anxiety       * clonazePAM 1 MG tablet    klonoPIN    4 tablet    Take 0.5-1 tablets (0.5-1 mg) by mouth 2 times daily as  needed for anxiety       cyclobenzaprine 5 MG tablet    FLEXERIL     Take 5 mg by mouth Reported on 3/31/2017       DULoxetine 30 MG EC capsule    CYMBALTA    60 capsule    1 tab daily for 1 week then 1 tab BID       FLUoxetine 20 MG capsule    PROzac    90 capsule    Take 3 capsules (60 mg) by mouth daily       folic acid 1 MG tablet    FOLVITE     Reported on 3/31/2017       HUMIRA 40 MG/0.8ML prefilled syringe kit   Generic drug:  adalimumab      Inject 40 mg Subcutaneous Reported on 3/31/2017       hydrOXYzine 25 MG tablet    ATARAX    90 tablet    Take 1-2 tablets (25-50 mg) by mouth every 6 hours as needed for anxiety       IBUPROFEN PO      Take 800 mg by mouth every 6 hours as needed for moderate pain Reported on 3/31/2017       methocarbamol 750 MG tablet    ROBAXIN    60 tablet    Take 1 tablet (750 mg) by mouth 3 times daily as needed for muscle spasms       methotrexate 2.5 MG tablet CHEMO      6 tablets Reported on 3/31/2017       methylPREDNISolone 4 MG tablet    MEDROL DOSEPAK    21 tablet    Follow package instructions       nicotine polacrilex 2 MG gum    NICORETTE     Reported on 3/31/2017       nortriptyline 10 MG capsule    PAMELOR    90 capsule    Start at 1 tab at bedtime for 3 days, then 2 tabs at bedtime for 3 days, then 3 tabs at bedtime.       orphenadrine 100 MG 12 hr tablet    NORFLEX    60 tablet    Take 1 tablet (100 mg) by mouth 2 times daily as needed for muscle spasms or moderate to severe pain       * oxyCODONE 5 MG capsule    OXY-IR     Take 5 mg by mouth every 6 hours as needed for moderate to severe pain Reported on 3/31/2017       * oxyCODONE 10 MG IR tablet    ROXICODONE    21 tablet    Take 1 tablet (10 mg) by mouth every 8 hours as needed for moderate to severe pain       * oxyCODONE 10 MG IR tablet    ROXICODONE    6 tablet    Take 1 tablet (10 mg) by mouth every 8 hours as needed for moderate to severe pain       oxyCODONE-acetaminophen 5-325 MG per tablet    PERCOCET    21  tablet    1 tab BID for 7 days then 1 tab qd       pregabalin 25 MG capsule    LYRICA    90 capsule    Take 25mg at bedtime for 7 days, then 25mg twice daily for 7 days, then take 25mg in the morning and 50mg at bedtime. If side effects, then reduce to last tolerable dosage.       tamsulosin 0.4 MG capsule    FLOMAX    30 capsule    Take 1 capsule (0.4 mg) by mouth daily       * Notice:  This list has 5 medication(s) that are the same as other medications prescribed for you. Read the directions carefully, and ask your doctor or other care provider to review them with you.

## 2017-03-31 NOTE — TELEPHONE ENCOUNTER
Pre-screening questions for Radiology Injections:    Injection to be done at which interventional clinic site? [facility]    Procedure ordered by Susana Pike    Procedure ordered? Lumbar Medial Branch Block    What insurance would patient like us to bill for this procedure? [employer]      Worker's comp- Any injection DO NOT SCHEDULE and route to Yasemin Eldridge.      [employer] insurance - If scheduling an SI joint injection DO NOT SCHEDULE and route to Yasemin Eldridge.     [employer]- MBB's must be scheduled at LEAST two weeks apart      [employer] - Any injection besides hip/shoulder/knee joint DO NOT SCHEDULE and route to Yasemin Eldridge. She will obtain PA and call pt back to schedule procedure or notify pt of denial.     Is an  needed? No     Patient has a drive home? (mandatory) Yes     Is patient taking any blood thinners (plavix, coumadin, jantoven, warfarin, heparin, pradaxa or dabigatran )? No   (If so, do not schedule, contact RN and/or MD)     Is patient taking any aspirin products? No   (If more than 325mg/day do not schedule; Contact RN/MD. For all non-cervical interventional procedures if patient is taking MORE than 325mg/day, limit aspirin to 81-325mg/day x 1 week. No hold required day of procedure.  For CERVICAL procedures, hold all aspirin products for 6 days.)      Does the patient have a bleeding or clotting disorder? No   (If yes, okay to schedule, but contact RN/MD).  **For any patients with platelet count <100, must be forwarded to provider**    Is patient diabetic? No If YES, have them bring their glucometer.    Does patient have an active infection or treated for one within the past week? No    Is patient currently taking any antibiotics?  No  For patients on chronic, preventative, or prophylactic antibiotics, procedures can be scheduled.   For patients on antibiotics for active or recent infection:  Simón Kenney [place], [place]-antibiotic  course must have been completed for 4 days  Nikki Olea-antibiotic course must have been completed for 7 days    Is patient currently taking any steroid medications? (i.e. Prednisone, Medrol)  No   For patients on steroid medications:  Simón Kenney [place], [place]-steroid course must have been completed for 4 days  Nikki Olea-steroid course must have been completed for 7 days    Review with patient:  If you are started on any steroids or antibiotics between now and your appointment, you must contact us because it may affect our ability to perform your procedure Informed    Is patient actively being treated for cancer or immunocompromised, including the spleen having been removed? No  **For Dr. Ogden patients without spleens should have the chart sent to her**  (If YES, do NOT schedule and route to RN)    Are you able to get on and off an exam table with minimal or no assistance? Yes  (If NO, do NOT schedule and route to RN)  Are you able to roll over and lay on your stomach with minimal or no assistance? Yes  (If NO, do NOT schedule and route to RN)         Any allergies to contrast dye, iodine, shellfish, or numbing and steroid medications? No  (If so, inform nursing and note in scheduling comments.)    Allergies: Hydrocodone and Remeron soltab      Any chance of pregnancy? No    Has the patient had a flu shot or any other vaccinations within 7 days before or after the procedure.    No       Does patient have an MRI/CT? MRi  (SI joint, hip injections, lumbar sympathetic blocks, and stellate ganglion blocks do not require an MRI)    If so, was it done at [place]? Yes      If not, where was it done?      Was the MRI done w/in the last 3 years? Yes     If MRI was not done at [place], [facility] or [facility] do NOT schedule. Route to nursing.  (If pt has disc the injection can be scheduled but pt has to bring disc to appt. If they show up w/out disc the injection cannot be  done)      **Must be scheduled with elapsed time interval of at least 2 weeks and not more than 6 months between the First MBB and the Second MBB**       Medial Branch Block Pre-Procedure Instructions    It is okay to take long acting pain medications (if you are on them) the day of the procedure but try not to take any short acting medications unless absolutely necessary. Informed        Long acting meds would include: Gabapentin (Neurontin), MS Contin, Oxycontin        Short acting meds would include:  Percocet, Oxycodone, Vicodin, Ibuprofen     The day of the procedure, you should try to do things that provoke your pain, since the injection is being done to see if it will relieve your pain . Informed    If your pain level is a 4 out of 10 or less on the day of the procedure, please call [phone] to reschedule.  Informed      Reminders (please tell patient if applicable):        Instructed pt to arrive 30 minutes early for IV start if this is for a cervical procedure, ALL sympathetic (stellate ganglion, hypogastric, or lumbar sympathetic block) and all sedation procedures (RFA, spinal cord stimulation trials).         -IVs are not routinely placed for [place] and [place] cervical cases       If NPO for sedation, it is okay to take medications with sips of water (except if they are to hold blood thinners).    *DO take blood pressure medication if it is prescribed*      If this is for a cervical MBB aspirin needs to be held for 6 days.        Do not schedule procedures requiring IV placement in the first appointment after lunch         For patients 85 or older we recommend having an adult stay w/ them for the remainder of the day.              Does the patient have any questions?

## 2017-04-03 ENCOUNTER — RADIANT APPOINTMENT (OUTPATIENT)
Dept: RADIOLOGY | Facility: CLINIC | Age: 52
End: 2017-04-03
Attending: PSYCHIATRY & NEUROLOGY
Payer: COMMERCIAL

## 2017-04-03 ENCOUNTER — RADIOLOGY INJECTION OFFICE VISIT (OUTPATIENT)
Dept: PALLIATIVE MEDICINE | Facility: CLINIC | Age: 52
End: 2017-04-03
Payer: COMMERCIAL

## 2017-04-03 VITALS — DIASTOLIC BLOOD PRESSURE: 86 MMHG | SYSTOLIC BLOOD PRESSURE: 141 MMHG | OXYGEN SATURATION: 100 % | HEART RATE: 77 BPM

## 2017-04-03 DIAGNOSIS — M47.817 LUMBOSACRAL SPONDYLOSIS WITHOUT MYELOPATHY: ICD-10-CM

## 2017-04-03 DIAGNOSIS — M47.819 FACET ARTHROPATHY: Primary | ICD-10-CM

## 2017-04-03 PROCEDURE — 64494 INJ PARAVERT F JNT L/S 2 LEV: CPT | Mod: RT | Performed by: PSYCHIATRY & NEUROLOGY

## 2017-04-03 PROCEDURE — 64493 INJ PARAVERT F JNT L/S 1 LEV: CPT | Mod: RT | Performed by: PSYCHIATRY & NEUROLOGY

## 2017-04-03 ASSESSMENT — PAIN SCALES - GENERAL
PAINLEVEL: EXTREME PAIN (9)
PAINLEVEL: MILD PAIN (2)

## 2017-04-03 NOTE — MR AVS SNAPSHOT
After Visit Summary   4/3/2017    Jailene Breen    MRN: [id number]           Patient Information     Date Of Birth          1965        Visit Information        Provider Department      4/3/2017 3:15 PM Silvia Gamble MD [facility]        Care Instructions    [facility]   Medial Branch Block Discharge Instructions      Your procedure was performed by:    Dr. Ashlyn Gamble      Medications used include:  Lidocaine  Bupivicaine  Omnipaque     You will need to complete the Pain Scale Log form and return it to us as soon as possible.  Once we have received the form, we will review it and call you to determine the next steps.     The form can be faxed to [phone] or mailed to:   [facility]   [address]   [place] [address]      You may resume your regular medications    You may resume your regular activities    Be cautious with walking as numbness and/or weakness in the lower extremities may occur for up to 6-8 hours due to the effects of the anesthetic.    Avoid driving for 6 hours. The local anesthetic could slow your reflexes.     You may shower, however no swimming or tub baths or hot tubs for 24 hours following your procedure.    Your pain will return after the numbing medications have worn off.  You may use your current pain medications as needed.    You may use anti-inflammatory medications (such as ibuprofen, aleve, or advil) or Tylenol for pain control if necessary.  Some people find it helpful to alternate ibuprofen and Tylenol every 3 hours for a couple of days.    You may use ice packs 10-15 minutes three to four times a day at the injection site for comfort.     Do not use heat to painful areas for 6 to 8 hours. This will give the local anesthetic time to wear off and prevent you from accidentally burning your skin.     If you experience any of the following, call the pain center nursing line during work  hours at [phone] or the after hours provider line at [phone]:  -Fever over 100 degree F  -Swelling, bleeding, redness, drainage, warmth at the injection site  -Progressive weakness or numbness on your legs  -If lumbar, call if you have a loss of bowel or bladder function  -Unusual new onset of pain that is not improving          Follow-ups after your visit        Your next 10 appointments already scheduled     Apr 06, 2017  9:00 AM CDT   New Visit with Padilla Keene LP   [facility] ([facility])    [address] [address]   [phone]            Apr 06, 2017 10:15 AM CDT   New Visit with Renetta White PT   [facility] ([facility])    [address] [id number] [phone]            Jun 06, 2017  3:00 PM CDT   Return Visit with JUANITA Ga [facility] ([facility])    [address] [id number] [phone]              Who to contact     If you have questions or need follow up information about today's clinic visit or your schedule please contact [facility] directly at [phone].  Normal or non-critical lab and imaging results will be communicated to you by Goowyhart, letter or phone within 4 business days after the clinic has received the results. If you do not hear from us within 7 days, please contact the clinic through Goowyhart or phone. If you have a critical or abnormal lab result, we will notify you by phone as soon as possible.  Submit refill requests through GENWI or call your pharmacy and they will forward the refill request to us. Please allow 3 business days for your refill to be completed.          Additional Information About Your Visit        GENWI Information     GENWI gives you secure access to your electronic health record. If you see a primary care provider, you can  also send messages to your care team and make appointments. If you have questions, please call your primary care clinic.  If you do not have a primary care provider, please call [phone] and they will assist you.        Care EveryWhere ID     This is your Care EveryWhere ID. This could be used by other organizations to access your [place] medical records  [id number]        Your Vitals Were     Pulse                   76            Blood Pressure from Last 3 Encounters:   04/03/17 147/89   03/31/17 146/88   03/01/17 146/90    Weight from Last 3 Encounters:   03/31/17 98.9 kg (218 lb)   03/01/17 93 kg (205 lb)   02/27/17 93 kg (205 lb)              Today, you had the following     No orders found for display       Primary Care Provider Office Phone # Fax #    JASON Rice [phone]        [facility] [place] [address] [address]        Thank you!     Thank you for choosing [facility]  for your care. Our goal is always to provide you with excellent care. Hearing back from our patients is one way we can continue to improve our services. Please take a few minutes to complete the written survey that you may receive in the mail after your visit with us. Thank you!             Your Updated Medication List - Protect others around you: Learn how to safely use, store and throw away your medicines at www.disposemymeds.org.          This list is accurate as of: 4/3/17  3:32 PM.  Always use your most recent med list.                   Brand Name Dispense Instructions for use    busPIRone 15 MG tablet    BUSPAR    90 tablet    O.5 tabs PO BID for 3 days then 0.5 tab qam and 1 tab qhs for 3 days then 1 tab bid for 3 days.  If still sympotmatic,  may increase to 1 tab QAM and 1.5 tabs at bedtime for 3 days, then 1.5 tabs BID for 3 days, then 1.5 tabs q am and 2 tabs QHS for 3 days, then 2 tabs BID.       * clonazePAM 1 MG tablet    klonoPIN    4 tablet    Take  0.5-1 tablets (0.5-1 mg) by mouth 2 times daily as needed for anxiety       * clonazePAM 1 MG tablet    klonoPIN    4 tablet    Take 0.5-1 tablets (0.5-1 mg) by mouth 2 times daily as needed for anxiety       cyclobenzaprine 5 MG tablet    FLEXERIL     Take 5 mg by mouth Reported on 4/3/2017       DULoxetine 30 MG EC capsule    CYMBALTA    60 capsule    1 tab daily for 1 week then 1 tab BID       FLUoxetine 20 MG capsule    PROzac    90 capsule    Take 3 capsules (60 mg) by mouth daily       folic acid 1 MG tablet    FOLVITE     Reported on 4/3/2017       HUMIRA 40 MG/0.8ML prefilled syringe kit   Generic drug:  adalimumab      Inject 40 mg Subcutaneous Reported on 3/31/2017       hydrOXYzine 25 MG tablet    ATARAX    90 tablet    Take 1-2 tablets (25-50 mg) by mouth every 6 hours as needed for anxiety       IBUPROFEN PO      Take 800 mg by mouth every 6 hours as needed for moderate pain Reported on 3/31/2017       methocarbamol 750 MG tablet    ROBAXIN    60 tablet    Take 1 tablet (750 mg) by mouth 3 times daily as needed for muscle spasms       methotrexate 2.5 MG tablet CHEMO      6 tablets Reported on 4/3/2017       methylPREDNISolone 4 MG tablet    MEDROL DOSEPAK    21 tablet    Follow package instructions       nicotine polacrilex 2 MG gum    NICORETTE     Reported on 3/31/2017       nortriptyline 10 MG capsule    PAMELOR    90 capsule    Start at 1 tab at bedtime for 3 days, then 2 tabs at bedtime for 3 days, then 3 tabs at bedtime.       orphenadrine 100 MG 12 hr tablet    NORFLEX    60 tablet    Take 1 tablet (100 mg) by mouth 2 times daily as needed for muscle spasms or moderate to severe pain       * oxyCODONE 5 MG capsule    OXY-IR     Take 5 mg by mouth every 6 hours as needed for moderate to severe pain Reported on 4/3/2017       * oxyCODONE 10 MG IR tablet    ROXICODONE    21 tablet    Take 1 tablet (10 mg) by mouth every 8 hours as needed for moderate to severe pain       * oxyCODONE 10 MG IR tablet     ROXICODONE    6 tablet    Take 1 tablet (10 mg) by mouth every 8 hours as needed for moderate to severe pain       oxyCODONE-acetaminophen 5-325 MG per tablet    PERCOCET    21 tablet    1 tab BID for 7 days then 1 tab qd       pregabalin 25 MG capsule    LYRICA    90 capsule    Take 25mg at bedtime for 7 days, then 25mg twice daily for 7 days, then take 25mg in the morning and 50mg at bedtime. If side effects, then reduce to last tolerable dosage.       tamsulosin 0.4 MG capsule    FLOMAX    30 capsule    Take 1 capsule (0.4 mg) by mouth daily       * Notice:  This list has 5 medication(s) that are the same as other medications prescribed for you. Read the directions carefully, and ask your doctor or other care provider to review them with you.

## 2017-04-03 NOTE — PATIENT INSTRUCTIONS
[facility]   Medial Branch Block Discharge Instructions      Your procedure was performed by:    Dr. Ashlyn Gamble      Medications used include:  Lidocaine  Bupivicaine  Omnipaque     You will need to complete the Pain Scale Log form and return it to us as soon as possible.  Once we have received the form, we will review it and call you to determine the next steps.     The form can be faxed to [phone] or mailed to:   [facility]   [address]   [place] [address]      You may resume your regular medications    You may resume your regular activities    Be cautious with walking as numbness and/or weakness in the lower extremities may occur for up to 6-8 hours due to the effects of the anesthetic.    Avoid driving for 6 hours. The local anesthetic could slow your reflexes.     You may shower, however no swimming or tub baths or hot tubs for 24 hours following your procedure.    Your pain will return after the numbing medications have worn off.  You may use your current pain medications as needed.    You may use anti-inflammatory medications (such as ibuprofen, aleve, or advil) or Tylenol for pain control if necessary.  Some people find it helpful to alternate ibuprofen and Tylenol every 3 hours for a couple of days.    You may use ice packs 10-15 minutes three to four times a day at the injection site for comfort.     Do not use heat to painful areas for 6 to 8 hours. This will give the local anesthetic time to wear off and prevent you from accidentally burning your skin.     If you experience any of the following, call the pain center nursing line during work hours at [phone] or the after hours provider line at [phone]:  -Fever over 100 degree F  -Swelling, bleeding, redness, drainage, warmth at the injection site  -Progressive weakness or numbness on your legs  -If lumbar, call if you have a loss of bowel or bladder function  -Unusual new onset  of pain that is not improving

## 2017-04-03 NOTE — NURSING NOTE
"Chief Complaint   Patient presents with     Pain       Initial /89  Pulse 76 Estimated body mass index is 26.54 kg/(m^2) as calculated from the following:    Height as of 3/31/17: 1.93 m (6' 4\").    Weight as of 3/31/17: 98.9 kg (218 lb).  Medication Reconciliation: complete     Injection intake:    If this procedure is requiring IV sedation has patient been NPO for 6  Hours? NA    Is patient on coumadin, plavix or other prescribed blood thinner?   No    If patient is on coumadin was it held for 5 days?   NA    If patient is on plavix was it held for 7 days?    NA     Does patient take aspirin?  No    If this is for a cervical procedure and patient is on aspirin has it been held for 6 days?   NA    Any allergies to contrast dye, iodine, steroid and/or numbing medications?  NO    Is patient currently taking antibiotics or have an active infection?  NO    Does patient have a ? Yes       Is patient pregnant or breastfeeding?  Not Applicable    Are the vital signs normal?  Yes    Monica Palma CMA (AAMA)        "

## 2017-04-03 NOTE — PROGRESS NOTES
Pre procedure Diagnosis: facet arthropathy   Post procedure Diagnosis: Same  Procedure performed: right lumbar medial branch block #1  Anesthesia: none  Complications: none  Operators: Ashlyn Gamble MD, Latisha Van DO    Indications:   Jailene Breen is a 51 year old male was sent by Susana Pike for lumbar medial branch blocks.  They have a history of RA and AS.  Exam shows pain with extension/rotation, lumbar paraspinal tenderness and they have tried conservative treatment including medications, injections. He plans on starting PT here, but has done PT at [facility]- and was discharged due to significant pain.    Options/alternatives, benefits and risks were discussed with the patient including bleeding, infection, tissue trauma, exposure to radiation, reaction to medications, spinal cord injury, weakness, numbness and paralysis.  Questions were answered to his satisfaction and he agrees to proceed. Voluntary informed consent was obtained and signed.     Vitals were reviewed: Yes  Allergies were reviewed:  Yes  Medications were reviewed:  Yes  Pre-procedure pain score: 8/10    Procedure:  After getting informed consent, patient was brought into the procedure suite and was placed in a prone position on the procedure table.   A Pause for the Cause was performed.  Patient was prepped and draped in sterile fashion.     Under AP fluoroscopic guidance the L3, L4, L5 vertebral bodies were identified. The C-arm was rotated to the oblique view to afford optimal visualization the pedicles.  Lidocaine 1% was used to anesthetize the skin at each level.  Under intermittent fluoroscopy, 25G 3.5inch Quinke spinal needles were positioned inferior and lateral to the intersection of the transverse process and pedicle at the Right L4 & L5 levels, as well as the corresponding sacral alar notch. The needle positions were verified and optimized from the AP view.    The anatomic targets for the L3 & L4 medial nerve and  L5 dorsal ramus (which functionally incorporates the medial branch) were the  L4 & L5 transverse processes and sacral alar notch, with laterality as described above, resulting in blockade of the L4/5 and L5/S1 facet joints.    Omnipaque-300 was injected, and appropriate spread of contrast was noted without vascular uptake.  A total of 0.5ml of Omnipaque was used.  9.5ml was wasted. Bupivacaine 0.5% 0.5 ml was injected at each location.  The needles were removed.      Hemostasis was achieved, the area was cleaned, and bandaids were placed when appropriate.  The patient tolerated the procedure well, and was taken to the recovery room.    Images were saved to PACS.    The patient will continue to monitor progress, and they were given a pain diary to complete at home.  They will either fax or mail this back to us or bring it to their next appointment. We will determine the treatment plan after we review the diary.      Post-procedure pain score: 2/10  Follow-up includes:   -f/u phone call in one week  -will await diary for further planning.    Ashlyn Gamble MD  [facility]

## 2017-04-03 NOTE — NURSING NOTE
Discharge Information    IV Discontiued Time:  NA    Amount of Fluid Infused:  NA    Discharge Criteria = When patient returns to baseline or as per MD order    Consciousness:  Pt is fully awake    Circulation:  BP +/- 20% of pre-procedure level    Respiration:  Patient is able to breathe deeply    O2 Sat:  Patient is able to maintain O2 Sat >92% on room air    Activity:  Moves 4 extremities on command    Ambulation:  Patient is able to stand and walk or stand and pivot into wheelchair    Dressing:  Clean/dry or No Dressing    Notes:   Discharge instructions and AVS given to patient    Patient meets criteria for discharge?  YES    Admitted to PCU?  No    Responsible adult present to accompany patient home?  Yes    Signature/Title:    Rosetta Hearn RN Care Coordinator  [facility]

## 2017-04-04 LAB — PAIN DRUG SCR UR W RPTD MEDS: NORMAL

## 2017-04-05 ENCOUNTER — MYC MEDICAL ADVICE (OUTPATIENT)
Dept: PALLIATIVE MEDICINE | Facility: CLINIC | Age: 52
End: 2017-04-05

## 2017-04-05 DIAGNOSIS — M51.369 DDD (DEGENERATIVE DISC DISEASE), LUMBAR: ICD-10-CM

## 2017-04-05 DIAGNOSIS — M47.817 LUMBOSACRAL SPONDYLOSIS WITHOUT MYELOPATHY: Primary | ICD-10-CM

## 2017-04-05 DIAGNOSIS — G89.4 CHRONIC PAIN SYNDROME: ICD-10-CM

## 2017-04-05 RX ORDER — OXYCODONE HYDROCHLORIDE 5 MG/1
TABLET ORAL
Qty: 15 TABLET | Refills: 0 | Status: SHIPPED | OUTPATIENT
Start: 2017-04-05 | End: 2017-04-25

## 2017-04-05 NOTE — TELEPHONE ENCOUNTER
Pt had a lumbar medial branch block # 1 on 4/3/17.  The post medial branch block form was received.      According to pain diary pt would like to proceed with medial branch block #2.    The pain diary was placed in Dr. Gamble's bin for review.    Max relief from block is:  L-side:  100%; R-side:  75%    Routed to scheduling coordinators to call pt to schedule [id number] medial branch block #2 with Dr. Gamble.    Cha Molina RN-BSN  [facility]

## 2017-04-05 NOTE — TELEPHONE ENCOUNTER
Pt LM at 0927 on 04/05/2017 --- Pt called wanting to know the results of his urine drug screen so he can receive Rx for pain meds to use on his bad days. Pls call with results #[phone].    Elana Madlonado    [facility]

## 2017-04-05 NOTE — TELEPHONE ENCOUNTER
Nortriptyline                  PRESENT      UNEXPECTED     Nortriptyline may be administered as a prescription drug; it is     also an expected metabolite of amitriptyline.      Ibuprofen                      PRESENT      UNEXPECTED     Drug Absent but Declared for Prescription Verification    Clonazepam                     Not Detected UNEXPECTED ng/mg creat    Oxycodone                      Not Detected UNEXPECTED ng/mg creat    Duloxetine                     Not Detected UNEXPECTED   ====================================================================   Test                      Result    Flag   Units      Ref Range    Creatinine              71               mg/dL      >=20   ==============================================================    Do you want to prescribe?    Sondra Hearn RN, [facility]  Pain Clinic Care Coordinator

## 2017-04-05 NOTE — TELEPHONE ENCOUNTER
UDS is as expected.   I will allow the patient 15 tabs per month of oxycodone and goal is to get off of this once lumbar RFA is done.   He will need to make a nurse appointment and sign an opiate agreement with me.     He has used a lot of providers for medication this last year. Please emphasize that I will no longer prescribe if he violates the opiate agreement.     Signed Prescriptions:                        Disp   Refills    oxyCODONE (ROXICODONE) 5 MG IR tablet      15 tab*0        Sig: May take one tablet every 6 hours max of 3 tabs per           day and will allow #15 tablets per month. OK to           fill and begin on 4/6/2017 and must last 30 days           until 5/6/2017.  Authorizing Provider: SUSANA PETTY    Signed script placed in locked in top drawer of trigger point cart at [facility]    Susana GRANT, RN CNP, FNP  [facility]

## 2017-04-06 ENCOUNTER — OFFICE VISIT (OUTPATIENT)
Dept: PALLIATIVE MEDICINE | Facility: CLINIC | Age: 52
End: 2017-04-06
Payer: COMMERCIAL

## 2017-04-06 ENCOUNTER — ALLIED HEALTH/NURSE VISIT (OUTPATIENT)
Dept: PALLIATIVE MEDICINE | Facility: CLINIC | Age: 52
End: 2017-04-06
Payer: COMMERCIAL

## 2017-04-06 DIAGNOSIS — M79.18 MYOFASCIAL PAIN: ICD-10-CM

## 2017-04-06 DIAGNOSIS — G89.4 CHRONIC PAIN SYNDROME: Primary | ICD-10-CM

## 2017-04-06 DIAGNOSIS — M45.9 ANKYLOSING SPONDYLITIS (H): ICD-10-CM

## 2017-04-06 DIAGNOSIS — Z79.891 ENCOUNTER FOR LONG-TERM OPIATE ANALGESIC USE: Primary | ICD-10-CM

## 2017-04-06 DIAGNOSIS — M47.817 LUMBOSACRAL SPONDYLOSIS WITHOUT MYELOPATHY: Primary | ICD-10-CM

## 2017-04-06 DIAGNOSIS — M51.369 DDD (DEGENERATIVE DISC DISEASE), LUMBAR: ICD-10-CM

## 2017-04-06 PROCEDURE — 97162 PT EVAL MOD COMPLEX 30 MIN: CPT | Mod: GP | Performed by: PHYSICAL THERAPIST

## 2017-04-06 PROCEDURE — 99207 ZZC NO CHARGE NURSE ONLY: CPT

## 2017-04-06 PROCEDURE — 97530 THERAPEUTIC ACTIVITIES: CPT | Mod: GP | Performed by: PHYSICAL THERAPIST

## 2017-04-06 PROCEDURE — 96150 HC HEALTH & BEHAVIOR ASSESS INITIAL, EA 15MIN: CPT | Performed by: PSYCHOLOGIST

## 2017-04-06 NOTE — TELEPHONE ENCOUNTER
Appt already made. Script in trigger point cart.     Sondra Hearn RN, [facility]  Pain Clinic Care Coordinator

## 2017-04-06 NOTE — MR AVS SNAPSHOT
After Visit Summary   4/6/2017    Jailene Breen    MRN: [id number]           Patient Information     Date Of Birth          1965        Visit Information        Provider Department      4/6/2017 9:00 AM [facility]        Today's Diagnoses     Chronic pain syndrome    -  1    Ankylosing spondylitis (H)           Follow-ups after your visit        Your next 10 appointments already scheduled     Apr 13, 2017  8:00 AM CDT   Return Visit with Padilla Keene LP   [facility] [place] ([facility] [place])    [address]  [place] [address]   [phone]            Apr 13, 2017  9:30 AM CDT   Return Visit with Renetta White PT   [facility] ([facility] [place])    [address]  [place] [address]   [phone]            Apr 26, 2017 11:15 AM CDT   Radiology Injections with Silvia Gamble MD   [facility] ([facility] [place])    [address]  [place] [address]   [phone]            Jun 06, 2017  3:00 PM CDT   Return Visit with JUANITA Ga [facility] ([facility] [place])    [address] [address]   [phone]              Who to contact     If you have questions or need follow up information about today's clinic visit or your schedule please contact [facility] directly at [phone].  Normal or non-critical lab and imaging results will be communicated to you by MyChart, letter or phone within 4 business days after the clinic has received the results. If you do not hear from us within 7 days, please contact the clinic through MyChart or phone. If you have a critical or abnormal lab result, we will notify you by phone as soon as possible.  Submit refill requests through [employer] or call your pharmacy and they will forward the refill request to  us. Please allow 3 business days for your refill to be completed.          Additional Information About Your Visit        [employer] Information     [employer] gives you secure access to your electronic health record. If you see a primary care provider, you can also send messages to your care team and make appointments. If you have questions, please call your primary care clinic.  If you do not have a primary care provider, please call [phone] and they will assist you.        Care EveryWhere ID     This is your Care EveryWhere ID. This could be used by other organizations to access your [place] medical records  [id number]         Blood Pressure from Last 3 Encounters:   04/03/17 141/86   03/31/17 146/88   03/01/17 146/90    Weight from Last 3 Encounters:   03/31/17 98.9 kg (218 lb)   03/01/17 93 kg (205 lb)   02/27/17 93 kg (205 lb)              We Performed the Following     HEALTH & BEHAVIOR ASSESS INITIAL, EA 15MIN        Primary Care Provider Office Phone # Fax #    JASON Rice [phone]        URGENT CARE [place] [address] [facility] [address]        Thank you!     Thank you for choosing [facility]  for your care. Our goal is always to provide you with excellent care. Hearing back from our patients is one way we can continue to improve our services. Please take a few minutes to complete the written survey that you may receive in the mail after your visit with us. Thank you!             Your Updated Medication List - Protect others around you: Learn how to safely use, store and throw away your medicines at www.disposemymeds.org.          This list is accurate as of: 4/6/17 11:59 PM.  Always use your most recent med list.                   Brand Name Dispense Instructions for use    busPIRone 15 MG tablet    BUSPAR    90 tablet    O.5 tabs PO BID for 3 days then 0.5 tab qam and 1 tab qhs for 3 days then 1 tab bid for 3 days.  If still sympotmatic,  may  increase to 1 tab QAM and 1.5 tabs at bedtime for 3 days, then 1.5 tabs BID for 3 days, then 1.5 tabs q am and 2 tabs QHS for 3 days, then 2 tabs BID.       * clonazePAM 1 MG tablet    klonoPIN    4 tablet    Take 0.5-1 tablets (0.5-1 mg) by mouth 2 times daily as needed for anxiety       * clonazePAM 1 MG tablet    klonoPIN    4 tablet    Take 0.5-1 tablets (0.5-1 mg) by mouth 2 times daily as needed for anxiety       cyclobenzaprine 5 MG tablet    FLEXERIL     Take 5 mg by mouth Reported on 4/3/2017       DULoxetine 30 MG EC capsule    CYMBALTA    60 capsule    1 tab daily for 1 week then 1 tab BID       FLUoxetine 20 MG capsule    PROzac    90 capsule    Take 3 capsules (60 mg) by mouth daily       folic acid 1 MG tablet    FOLVITE     Reported on 4/3/2017       HUMIRA 40 MG/0.8ML prefilled syringe kit   Generic drug:  adalimumab      Inject 40 mg Subcutaneous Reported on 3/31/2017       hydrOXYzine 25 MG tablet    ATARAX    90 tablet    Take 1-2 tablets (25-50 mg) by mouth every 6 hours as needed for anxiety       IBUPROFEN PO      Take 800 mg by mouth every 6 hours as needed for moderate pain Reported on 3/31/2017       methocarbamol 750 MG tablet    ROBAXIN    60 tablet    Take 1 tablet (750 mg) by mouth 3 times daily as needed for muscle spasms       methotrexate 2.5 MG tablet CHEMO      6 tablets Reported on 4/3/2017       methylPREDNISolone 4 MG tablet    MEDROL DOSEPAK    21 tablet    Follow package instructions       nicotine polacrilex 2 MG gum    NICORETTE     Reported on 3/31/2017       nortriptyline 10 MG capsule    PAMELOR    90 capsule    Start at 1 tab at bedtime for 3 days, then 2 tabs at bedtime for 3 days, then 3 tabs at bedtime.       orphenadrine 100 MG 12 hr tablet    NORFLEX    60 tablet    Take 1 tablet (100 mg) by mouth 2 times daily as needed for muscle spasms or moderate to severe pain       * oxyCODONE 5 MG capsule    OXY-IR     Take 5 mg by mouth every 6 hours as needed for moderate to  severe pain Reported on 4/3/2017       * oxyCODONE 10 MG IR tablet    ROXICODONE    21 tablet    Take 1 tablet (10 mg) by mouth every 8 hours as needed for moderate to severe pain       * oxyCODONE 10 MG IR tablet    ROXICODONE    6 tablet    Take 1 tablet (10 mg) by mouth every 8 hours as needed for moderate to severe pain       * oxyCODONE 5 MG IR tablet    ROXICODONE    15 tablet    May take one tablet every 6 hours max of 3 tabs per day and will allow #15 tablets per month. OK to fill and begin on 4/6/2017 and must last 30 days until 5/6/2017.       oxyCODONE-acetaminophen 5-325 MG per tablet    PERCOCET    21 tablet    1 tab BID for 7 days then 1 tab qd       pregabalin 25 MG capsule    LYRICA    90 capsule    Take 25mg at bedtime for 7 days, then 25mg twice daily for 7 days, then take 25mg in the morning and 50mg at bedtime. If side effects, then reduce to last tolerable dosage.       tamsulosin 0.4 MG capsule    FLOMAX    30 capsule    Take 1 capsule (0.4 mg) by mouth daily       * Notice:  This list has 6 medication(s) that are the same as other medications prescribed for you. Read the directions carefully, and ask your doctor or other care provider to review them with you.

## 2017-04-06 NOTE — LETTER
[facility]    04/06/17    Patient: Jailene Breen  YOB: 1965  Medical Record Number: [id number]                                                                  Controlled Substance Agreement  I understand that my care provider has prescribed controlled substances (narcotics, tranquilizers, and/or stimulants) to help manage my condition(s).  I am taking this medicine to help me function or work.  I know that this is strong medicine.  It could have serious side effects and even cause a dependency on the drug.  If I stop these medicines suddenly, I could have severe withdrawal symptoms.    The risks, benefits, and side effects of these medication(s) were explained to me.  I agree that:  1. I will take part in other treatments as advised by my provider.  This may be psychiatry or counseling, physical therapy, behavioral therapy, group treatment, or a referral to a pain clinic.  I will reduce or stop my medicine when my provider tells me to do so.   2. I will take my medicines as prescribed.  I will not change the dose or schedule unless my provider tells me to.  There will be no refills if I  run out early.   I may be contacted at any time without warning and asked to complete a drug test or pill count.   3. I will keep all my appointments at the clinic.  If I miss appointments or fail to follow instructions, my provider may stop my medicine.  4. I will not ask other providers to prescribe controlled substances. And I will not accept controlled substances from other people. If I need another prescribed controlled substance for a new reason, I will notify my provider within one business day.  5. If I enroll in the [place] Medical Marijuana program, I will tell my provider.  I will also sign an agreement to share my medical records with my provider.  6. I will use one pharmacy to fill all of my controlled substance prescriptions.  If my prescription is mailed to my pharmacy,  it may take 5 to 7 days for my medicine to be ready.  7. I understand that my provider, clinic care team, and pharmacy can track controlled substance prescriptions from other providers through a central database (prescription monitoring program).  8. I will bring in my list of medications (or my medicine bottles) each time I come to the clinic.  REV- 04/2016                                                                                                                                            Page 1 of 2      [facility]    04/06/17    Patient: Jailene Breen  YOB: 1965  Medical Record Number: [id number]    9. Refills of controlled substances will be made only during office hours.  It is up to me to make sure that I do not run out of my medicines on weekends or holidays.    10. I am responsible for my prescriptions.  If the medicine is lost or stolen, it will not be replaced.   I also agree not to share these medicines with anyone.  11. I agree to not use ANY illegal or recreational drugs.  This includes marijuana, cocaine, bath salts or other drugs.  I agree not to use alcohol unless my provider says I may.  I agree to give urine samples whenever asked.  If I fail to give a urine sample, the provider may stop my medicine.     12. I will tell my nurse or provider right away if I become pregnant or have a new medical problem treated outside of [facility].  13. I understand that this medicine can affect my thinking and judgment.  It may be unsafe for me to drive, use machinery and do dangerous tasks.  I will not do any of these things until I know how the medicine affects me.  If my dose changes, I will wait to see how it affects me.  I will contact my provider if I have concerns about medicine side effects.  I understand that if I do not follow any of the conditions above, my prescriptions or treatment may be stopped.    I agree that my provider, clinic care team, and  pharmacy may work with any city, state or federal law enforcement agency that investigates the misuse, sale, or other diversion of my controlled medicine. I will allow my provider to discuss my care with or share a copy of this agreement with any other treating provider, pharmacy or emergency room where I receive care.  I agree to give up (waive) any right of privacy or confidentiality with respect to these authorizations.   I have read this agreement and have asked questions about anything I did not understand.   ___________________________________    ___________________________  Patient Signature                                                           Date and Time  ___________________________________     ____________________________  Witness                                                                            Date and Time  ___________________________________  [facility] PAIN NURSE  REV-  04/2016                                                                                                                                                                 Page 2 of 2  Opioid Pain Medicines (also known as Narcotics)  What You Need to Know      What are opioids?   Opioids are pain medicines that must be prescribed by a doctor. Examples are:     morphine (MS Contin, Raven)    oxycodone (Oxycontin)    oxycodone and acetaminophen (Percocet)    hydrocodone and acetaminophen (Vicodin, Norco)     fentanyl patch (Duragesic)     hydromorphone (Dilaudid)     methadone     What do opioids do well?   Opioids are best for short-term pain after a surgery or injury. They also work well for cancer pain. Unlike other pain medicines, they do not cause liver or kidney failure or ulcers. They may help some people with long-lasting (chronic) pain.     What do opioids NOT do well?   Opioids never get rid of pain entirely, and they do not work well for most patients with chronic pain. Opioids do not reduce swelling, one of the causes of  pain. They also don t work well for nerve pain.     Side effects  Talk to your doctor before you start or decide to keep taking one of these medicines. Side effects include:    Lowers your breathing rate enough that it could cause death    Death due to taking more than the prescribed dose    Serious lifelong opioid use      Dependence is not the same as addiction. Addiction is when people keep using a substance that harms their body, their mind or their relations with others. If you have a history of drug or alcohol abuse, taking opioids can cause a relapse.  Over time, opioids don t work as well. Most people will need higher and higher doses. The higher the dose, the more serious the side effects. We don t know the long-term effects of opioids.   People who have used opioids for a long time have a lower quality of life, worse depression, higher levels of pain and more visits to doctors.  Overdose from prescription drugs is the second leading cause of death in the [place] The risk of overdose rises when opioids are taken with other drugs such as:    Medicines used for anxiety and panic attacks (such as lorazepam, alprazolam, clonazepam    Other sedatives    Alcohol    Illegal drugs such as heroin  Never share your opioids with others. Be sure to store opioids in a secure place, locked if possible.Young children can easily swallow them and overdose.     Are there other ways to manage pain?  Ways to help reduce pain:    Exercise every day.    Treat health problems that may be causing pain.    Treat mental health problems like depression and anxiety.     Worse depression symptoms; Less pleasure in things you usually enjoy    Feeling tired or sluggish    Slower thoughts or cloudy thinking    Being more sensitive to pain over time; Pain is harder to control.    Trouble sleeping or restless sleep    Changes in hormone levels (for example, less testosterone).     Changes in sex drive or ability to have sex    Long lasting  nausea and constipation    Trouble breathing while asleep; This is worse with lung problems like COPD or sleep apnea.    Unsafe driving    Getting sick more often    Itching    Feeling dizzy    Dry mouth    Sweating    Trouble emptying the bladder (peeing). This is worse if you have an enlarged prostate or get urinary tract infections (UTIs).    What else should I know about opioids?  When someone takes opioids for too long or too often, they become dependent. This means that if you stop or reduce the medicine too quickly, you will have withdrawal symptoms.          Practice good sleep habits.  Try to go to bed and get up at the same time every day.    Stop smoking.  Tobacco use can make pain worse.    Do things that you enjoy.    Find a way to work through pain without drugs.  Try deep breathing, meditation, visual imagery and aromatherapy.    Ask your doctor to help you create a plan to manage your pain.

## 2017-04-06 NOTE — MR AVS SNAPSHOT
After Visit Summary   4/6/2017    Jailene Breen    MRN: [id number]           Patient Information     Date Of Birth          1965        Visit Information        Provider Department      4/6/2017 11:15 AM Nurse,  [facility]        Today's Diagnoses     Encounter for long-term opiate analgesic use    -  1       Follow-ups after your visit        Your next 10 appointments already scheduled     Apr 13, 2017  8:00 AM CDT   Return Visit with Padilla Keene LP   [facility] ([facility] [place])    [address]  [place] [address]   [phone]            Apr 13, 2017  9:30 AM CDT   Return Visit with Renetta White PT   [facility] ([facility] [place])    [address]  [place] [address]   [phone]            Apr 26, 2017 11:15 AM CDT   Radiology Injections with Silvia Gamble MD   [facility] ([facility] [place])    [address]  [place] [address]   [phone]            Jun 06, 2017  3:00 PM CDT   Return Visit with JUANITA Ga CNP   [facility] ([facility] [place])    [address] [address]   [phone]              Who to contact     If you have questions or need follow up information about today's clinic visit or your schedule please contact [facility] directly at [phone].  Normal or non-critical lab and imaging results will be communicated to you by MyChart, letter or phone within 4 business days after the clinic has received the results. If you do not hear from us within 7 days, please contact the clinic through MyChart or phone. If you have a critical or abnormal lab result, we will notify you by phone as soon as possible.  Submit refill requests through [employer] or call your pharmacy and they will forward the refill request to us. Please allow 3  business days for your refill to be completed.          Additional Information About Your Visit        MyChart Information     [employer] gives you secure access to your electronic health record. If you see a primary care provider, you can also send messages to your care team and make appointments. If you have questions, please call your primary care clinic.  If you do not have a primary care provider, please call [phone] and they will assist you.        Care EveryWhere ID     This is your Care EveryWhere ID. This could be used by other organizations to access your [place] medical records  [id number]         Blood Pressure from Last 3 Encounters:   04/03/17 141/86   03/31/17 146/88   03/01/17 146/90    Weight from Last 3 Encounters:   03/31/17 98.9 kg (218 lb)   03/01/17 93 kg (205 lb)   02/27/17 93 kg (205 lb)              Today, you had the following     No orders found for display       Primary Care Provider Office Phone # Fax #    JASON Rice [phone]        URGENT CARE [place] [address] [address] [address]        Thank you!     Thank you for choosing [facility]  for your care. Our goal is always to provide you with excellent care. Hearing back from our patients is one way we can continue to improve our services. Please take a few minutes to complete the written survey that you may receive in the mail after your visit with us. Thank you!             Your Updated Medication List - Protect others around you: Learn how to safely use, store and throw away your medicines at www.disposemymeds.org.          This list is accurate as of: 4/6/17 12:14 PM.  Always use your most recent med list.                   Brand Name Dispense Instructions for use    busPIRone 15 MG tablet    BUSPAR    90 tablet    O.5 tabs PO BID for 3 days then 0.5 tab qam and 1 tab qhs for 3 days then 1 tab bid for 3 days.  If still sympotmatic,  may increase to 1 tab QAM and 1.5 tabs  at bedtime for 3 days, then 1.5 tabs BID for 3 days, then 1.5 tabs q am and 2 tabs QHS for 3 days, then 2 tabs BID.       * clonazePAM 1 MG tablet    klonoPIN    4 tablet    Take 0.5-1 tablets (0.5-1 mg) by mouth 2 times daily as needed for anxiety       * clonazePAM 1 MG tablet    klonoPIN    4 tablet    Take 0.5-1 tablets (0.5-1 mg) by mouth 2 times daily as needed for anxiety       cyclobenzaprine 5 MG tablet    FLEXERIL     Take 5 mg by mouth Reported on 4/3/2017       DULoxetine 30 MG EC capsule    CYMBALTA    60 capsule    1 tab daily for 1 week then 1 tab BID       FLUoxetine 20 MG capsule    PROzac    90 capsule    Take 3 capsules (60 mg) by mouth daily       folic acid 1 MG tablet    FOLVITE     Reported on 4/3/2017       HUMIRA 40 MG/0.8ML prefilled syringe kit   Generic drug:  adalimumab      Inject 40 mg Subcutaneous Reported on 3/31/2017       hydrOXYzine 25 MG tablet    ATARAX    90 tablet    Take 1-2 tablets (25-50 mg) by mouth every 6 hours as needed for anxiety       IBUPROFEN PO      Take 800 mg by mouth every 6 hours as needed for moderate pain Reported on 3/31/2017       methocarbamol 750 MG tablet    ROBAXIN    60 tablet    Take 1 tablet (750 mg) by mouth 3 times daily as needed for muscle spasms       methotrexate 2.5 MG tablet CHEMO      6 tablets Reported on 4/3/2017       methylPREDNISolone 4 MG tablet    MEDROL DOSEPAK    21 tablet    Follow package instructions       nicotine polacrilex 2 MG gum    NICORETTE     Reported on 3/31/2017       nortriptyline 10 MG capsule    PAMELOR    90 capsule    Start at 1 tab at bedtime for 3 days, then 2 tabs at bedtime for 3 days, then 3 tabs at bedtime.       orphenadrine 100 MG 12 hr tablet    NORFLEX    60 tablet    Take 1 tablet (100 mg) by mouth 2 times daily as needed for muscle spasms or moderate to severe pain       * oxyCODONE 5 MG capsule    OXY-IR     Take 5 mg by mouth every 6 hours as needed for moderate to severe pain Reported on 4/3/2017        * oxyCODONE 10 MG IR tablet    ROXICODONE    21 tablet    Take 1 tablet (10 mg) by mouth every 8 hours as needed for moderate to severe pain       * oxyCODONE 10 MG IR tablet    ROXICODONE    6 tablet    Take 1 tablet (10 mg) by mouth every 8 hours as needed for moderate to severe pain       * oxyCODONE 5 MG IR tablet    ROXICODONE    15 tablet    May take one tablet every 6 hours max of 3 tabs per day and will allow #15 tablets per month. OK to fill and begin on 4/6/2017 and must last 30 days until 5/6/2017.       oxyCODONE-acetaminophen 5-325 MG per tablet    PERCOCET    21 tablet    1 tab BID for 7 days then 1 tab qd       pregabalin 25 MG capsule    LYRICA    90 capsule    Take 25mg at bedtime for 7 days, then 25mg twice daily for 7 days, then take 25mg in the morning and 50mg at bedtime. If side effects, then reduce to last tolerable dosage.       tamsulosin 0.4 MG capsule    FLOMAX    30 capsule    Take 1 capsule (0.4 mg) by mouth daily       * Notice:  This list has 6 medication(s) that are the same as other medications prescribed for you. Read the directions carefully, and ask your doctor or other care provider to review them with you.

## 2017-04-06 NOTE — PROGRESS NOTES
"PHYSICAL THERAPY INITIAL EVALUATION and PLAN OF CARE    Patient Name: Jailene Breen   \"Jamison\"  : 1965    MRN: [id number]   Pain Management Provider:  Susana Pike CNP/[facility]--saw for eval prior to PT eval    Diagnosis:    Lumbosacral spondylosis without myelopathy  DDD (degenerative disc disease), lumbar  Myofascial pain  Ankylosing spondylitis (H)    SUBJECTIVE:    PRESENTATION AND ETIOLOGY    Chief Complaint: patient with complaints of R lower back/hip pain, does go down into R thigh; to the knee; is having some issues with R medial knee as well--did have R knee scope for meniscal tear in 2016.    Onset / Etiology: on and off for years; started up last year; knee pain started last year when training for [employer]    Hx of Rheumatoid arthritis; Ankylosing spondylitis--diagnosed recently; hx of anxiety and panic attacks, has been seeing outside therapist--has been helping    Has had first MBB done recently---felt like it worked really well, hopefully that RFA will be able to help the pain    He has started running again, started with 3 miles about 3 weeks ago, up to 6 miles at a time; about 25 miles a week; going about every other day; Going at a slower pace than before; states that he doesn't have any pain when running, muscles are sore for a time afterwards, but no significant increase in pain than before.  Overall, feels like he has less pain now that he is running, than not.  States that it has helped with stress relief, anxiety is down.  Started running again 3 miles; muscles sore from not; states that it hasn't increased it overall; does go down during it    Pattern Since Onset: Worsened    Pain is described as Sharp     Frequency: Constant--except when running     Intensity: Best 0/10 when running; 7/10 other times, Worst 10+/10;  Current 7/10 ; Average 7/10; gets to 10+/10 multiple times a day    SENSATION: denies numbness and tingling at this time    Sleep: not " sleeping well, usually side sleeper, and like to be on the R side; mind doesn't shut off    Fatigue Level: (scale 0 (good energy) 10 (exhausted))  NA/10 average    LEVEL OF FUNCTION AT START OF CARE    Walking tolerance: about 1 mile  Sitting tolerance: 20 mins   Standing tolerance: 30 mins    Current Aggravating Activities / Functional Limitations:  Getting out of chairs; need to change positions; bending over, lifting--heavier things    CURRENT / PREVIOUS INTERVENTION(S):     Relieving Activities / Self Care: running    Previous / Current therapies for current chief complaint: PT: not recently, Chiropractic - didn't help; made it worse, Acupuncture: none, TENS helps a little bit; has for the knee    Prior Functional Level: No functional limitations prior to onset of chief complaint.       DEMOGRAPHICS  Employment Status: Not working at this time; recently lost job    Social Support: Lives with family    Home house; Stairs: yes, issue going up; handrails    Fall History:  Fell in the beginning of March--syncopal  episode;     Assistive Devices:  Crutches; haven't used recently    Patient's perceived quality of life:  fair    Pertinent Medical  / Surgical History: Epic Snapshot Reviewed:  Contributing factors to the severity / complexity of the patient's chief complaint include:  See provider's note    Patient's goals for physical therapy: none stated; would like to get back to running half/full marathons    ===============================================================  OBJECTIVE:  POSTURE:  Observation: Slouched posture, forward head; rounded shoulders;  clenching jaw; poor core engagement with seated posture. Able to speak in full sentences without SOB or cough noted; upper chest breathing noted; needs cues to stay on task    GAIT, LOCOMOTION, and BALANCE:  Gait and Locomotion: Non-Antalgic; slight forward flexed, decreased trunk rotation    Balance: Demo fair SLS without UE support; fair core stability with  SLS; able to  tandem; slight sway in Romberg stance with eyes closed    RANGE OF MOTION:   Cervical: WFL, complaints of tightness at end range;   Shoulders: WFL, complaints of mid back/scapular pain  Lumbar: limited flexion, extension and rotation, complaints of pain with all movements  Hips: pain in R low back with L hip flexion/ER/IR; limited hip flexion/ER/IR on R due to R low back pain  Knee flexion/extension: WFL    MUSCLE PERFORMANCE:   Strength: demo core instability; fair scapular humeral rhythm; able to do Heel raises/Heel walk without UE support; able to perform partial squat--limited secondary to R knee pain    Flexibility: tightness noted in levator scap, pect, HS, hip flexor, GS    FUNCTIONAL TESTING/OBSERVATION: independent with supine to/from sit, and sit to/from stand; moves slowly; grimaces with sit to stand after sitting for extended time    Pain behaviors: None    Information gathered through testing and observation    ===============================================================  Today's Treatment:  Initial evaluation  Therapeutic Procedures/Exercises: discussed importance of consistency vs intensity; OK to slow down progression of running, OK to no increase every week; especially if pain doing OK at this time with back; but bothering knee sometimes.  Instructed in trunk rotation stretch; declined handout  Discussed Pain Center PT and POC.  ===============================================================  ASSESSMENT:  Physical Therapy Diagnosis:Impaired Posture and Impaired Muscle Performance    Patient requires PT intervention for the following impairments: Limited knowledge of condition and / or self care - inability to control symptoms, Impaired cognition / insight, Impaired functional mobility, Impaired gait / weight bearing tolerance, Impaired posture / muscle imbalance, Impaired static and / or dynamic balance, Muscle flexibility limitations, Pain and ROM limitations    Anticipated  Goals and Expected Outcomes:    Goals   8 weeks  Patient will report the use of 2 self care practices during their day.  Patient will report the participation in stretching daily.  Patient will demonstrate the ability to find core strength in neutral posture.  Patient will demonstrate the ability to relax muscle group before stretching.    16 weeks  Patient will be independent with a home exercise program.  Patient will be independent with posture correction.  Patient will report independence with a self care/flare management program.  Patient will demonstrate improved functional strength and endurance as reports by increased tolerance for IADLs and more consistent participation in daily activity.     Rehab potential for achieving goals: fair.  Patient has a number of psychosocial contributing factors. Prognosis will depend on concurrent addressing of psychosocial contributing factors.  ===============================================================  PLAN:   Patient will benefit from skilled physical therapy consisting of:  neuromuscular reeducation of: kinesthetic sense and posture for sitting and/or standing activities, education in self care / home management training to include instruction in: symptom control techniques, therapeutic activities to achieve improved functional performance in: daily actvities and therapeutic exercise to develop: strength and endurance, flexibility and core stability.       Assessment will be ongoing with changes in treatment as indicated.  Benefits/risks/alternatives to treatment have been reviewed and the patient has been instructed to contact this office if they have any questions or concerns.  This plan of care has been discussed with the patient and the patient is in agreement.     Frequency / Duration:  Patient will be seen for a total of 8-10 visits;  at a frequency of once every 1-2 weeks for 3-4 visits, once every 2-4 weeks for 3-4 visits, then once every 4-6 weeks.    Next  Visit: Focus on TNE, posture    Total Visit Time:  45 minutes  Eval: 30  mins   TE: 15 mins             Renetta Koehlerberg, PT                                      Date:  4/6/2017    Therapy Evaluation Codes:   1) History comprised of:                  Personal factors that impact the plan of care:                                    Anxiety and Past/current experiences.                   Comorbidity factors that impact the plan of care are:                                    panic attacks.                    Medications impacting care: Pain.  2) Examination of Body Systems comprised of:                  Body structures and functions that impact the plan of care:                                    Hip, Knee and Lumbar spine.                  Activity limitations that impact the plan of care are:                                    Sitting, Throwing, Working and Sleeping.  3) Clinical presentation characteristics are:                  Evolving/Changing.  4) Decision-Making                                  Moderate complexity using standardized patient assessment instrument and/or measureable assessment of functional outcome.  Cumulative Therapy Evaluation is: Moderate complexity.      =====================================================  **  Referring Provider Certification: Referring Provider reviewing certifies that the above treatment / plan of care is required and authorized, and that the patient's plan will be reviewed every thirty (30) days **.   ======================================================     PRESENT:  NA    MULTIDISCIPLINARY PATIENT / FAMILY EDUCATION RECORD  Department:  Physical Therapy    Readiness to Learn: Ability to understand verbal instructions, Ability to understand written instructions, Knowledge of educational needs / treatment plan  Specific Barriers to Learning: None  Referrals: None  Learning Needs: Rehabilitation techniques to improve functional independence Pain management  education to improve daily activity tolerance.  Who: Patient  How: Demonstration, Verbal instructions, Written instructions  Response: Appropriate verbal response, Asked questions, Demonstrated ability, Verbalized recall / understanding
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
## 2017-04-06 NOTE — MR AVS SNAPSHOT
After Visit Summary   4/6/2017    Jailene Breen    MRN: [id number]           Patient Information     Date Of Birth          1965        Visit Information        Provider Department      4/6/2017 10:15 AM Renetta White, PT [facility]        Today's Diagnoses     Lumbosacral spondylosis without myelopathy    -  1    DDD (degenerative disc disease), lumbar        Myofascial pain        Ankylosing spondylitis (H)           Follow-ups after your visit        Your next 10 appointments already scheduled     Apr 13, 2017  8:00 AM CDT   Return Visit with Padilla Keene LP   [facility] ([facility] [place])    [address]  [place] [address]   [phone]            Apr 13, 2017  9:30 AM CDT   Return Visit with Renetta White PT   [facility] ([facility] [place])    [address]  [place] [address]   [phone]            Apr 26, 2017 11:15 AM CDT   Radiology Injections with Silvia Gamble MD   [facility] ([facility] [place])    [address] [address]   [phone]            Jun 06, 2017  3:00 PM CDT   Return Visit with JUANITA [facility] ([facility] [place])    [address] [address]   [phone]              Who to contact     If you have questions or need follow up information about today's clinic visit or your schedule please contact [facility] directly at [phone].  Normal or non-critical lab and imaging results will be communicated to you by MyChart, letter or phone within 4 business days after the clinic has received the results. If you do not hear from us within 7 days, please contact the clinic through MyChart or phone. If you have a critical or abnormal lab result, we will notify you by phone as soon as possible.  Submit refill  requests through [facility] or call your pharmacy and they will forward the refill request to us. Please allow 3 business days for your refill to be completed.          Additional Information About Your Visit        [facility] Information     Runa gives you secure access to your electronic health record. If you see a primary care provider, you can also send messages to your care team and make appointments. If you have questions, please call your primary care clinic.  If you do not have a primary care provider, please call [phone] and they will assist you.        Care EveryWhere ID     This is your Care EveryWhere ID. This could be used by other organizations to access your [facility] medical records  [id number]         Blood Pressure from Last 3 Encounters:   04/03/17 141/86   03/31/17 146/88   03/01/17 146/90    Weight from Last 3 Encounters:   03/31/17 98.9 kg (218 lb)   03/01/17 93 kg (205 lb)   02/27/17 93 kg (205 lb)              We Performed the Following     HC PT EVAL, MODERATE COMPLEXITY     THERAPEUTIC ACTIVITIES        Primary Care Provider Office Phone # Fax #    JASON Rice [phone]        [facility] [address] [address]        Thank you!     Thank you for choosing [facility]  for your care. Our goal is always to provide you with excellent care. Hearing back from our patients is one way we can continue to improve our services. Please take a few minutes to complete the written survey that you may receive in the mail after your visit with us. Thank you!             Your Updated Medication List - Protect others around you: Learn how to safely use, store and throw away your medicines at www.disposemymeds.org.          This list is accurate as of: 4/6/17  3:09 PM.  Always use your most recent med list.                   Brand Name Dispense Instructions for use    busPIRone 15 MG tablet    BUSPAR    90 tablet    O.5 tabs PO BID for  3 days then 0.5 tab qam and 1 tab qhs for 3 days then 1 tab bid for 3 days.  If still sympotmatic,  may increase to 1 tab QAM and 1.5 tabs at bedtime for 3 days, then 1.5 tabs BID for 3 days, then 1.5 tabs q am and 2 tabs QHS for 3 days, then 2 tabs BID.       * clonazePAM 1 MG tablet    klonoPIN    4 tablet    Take 0.5-1 tablets (0.5-1 mg) by mouth 2 times daily as needed for anxiety       * clonazePAM 1 MG tablet    klonoPIN    4 tablet    Take 0.5-1 tablets (0.5-1 mg) by mouth 2 times daily as needed for anxiety       cyclobenzaprine 5 MG tablet    FLEXERIL     Take 5 mg by mouth Reported on 4/3/2017       DULoxetine 30 MG EC capsule    CYMBALTA    60 capsule    1 tab daily for 1 week then 1 tab BID       FLUoxetine 20 MG capsule    PROzac    90 capsule    Take 3 capsules (60 mg) by mouth daily       folic acid 1 MG tablet    FOLVITE     Reported on 4/3/2017       HUMIRA 40 MG/0.8ML prefilled syringe kit   Generic drug:  adalimumab      Inject 40 mg Subcutaneous Reported on 3/31/2017       hydrOXYzine 25 MG tablet    ATARAX    90 tablet    Take 1-2 tablets (25-50 mg) by mouth every 6 hours as needed for anxiety       IBUPROFEN PO      Take 800 mg by mouth every 6 hours as needed for moderate pain Reported on 3/31/2017       methocarbamol 750 MG tablet    ROBAXIN    60 tablet    Take 1 tablet (750 mg) by mouth 3 times daily as needed for muscle spasms       methotrexate 2.5 MG tablet CHEMO      6 tablets Reported on 4/3/2017       methylPREDNISolone 4 MG tablet    MEDROL DOSEPAK    21 tablet    Follow package instructions       nicotine polacrilex 2 MG gum    NICORETTE     Reported on 3/31/2017       nortriptyline 10 MG capsule    PAMELOR    90 capsule    Start at 1 tab at bedtime for 3 days, then 2 tabs at bedtime for 3 days, then 3 tabs at bedtime.       orphenadrine 100 MG 12 hr tablet    NORFLEX    60 tablet    Take 1 tablet (100 mg) by mouth 2 times daily as needed for muscle spasms or moderate to severe pain        * oxyCODONE 5 MG capsule    OXY-IR     Take 5 mg by mouth every 6 hours as needed for moderate to severe pain Reported on 4/3/2017       * oxyCODONE 10 MG IR tablet    ROXICODONE    21 tablet    Take 1 tablet (10 mg) by mouth every 8 hours as needed for moderate to severe pain       * oxyCODONE 10 MG IR tablet    ROXICODONE    6 tablet    Take 1 tablet (10 mg) by mouth every 8 hours as needed for moderate to severe pain       * oxyCODONE 5 MG IR tablet    ROXICODONE    15 tablet    May take one tablet every 6 hours max of 3 tabs per day and will allow #15 tablets per month. OK to fill and begin on 4/6/2017 and must last 30 days until 5/6/2017.       oxyCODONE-acetaminophen 5-325 MG per tablet    PERCOCET    21 tablet    1 tab BID for 7 days then 1 tab qd       pregabalin 25 MG capsule    LYRICA    90 capsule    Take 25mg at bedtime for 7 days, then 25mg twice daily for 7 days, then take 25mg in the morning and 50mg at bedtime. If side effects, then reduce to last tolerable dosage.       tamsulosin 0.4 MG capsule    FLOMAX    30 capsule    Take 1 capsule (0.4 mg) by mouth daily       * Notice:  This list has 6 medication(s) that are the same as other medications prescribed for you. Read the directions carefully, and ask your doctor or other care provider to review them with you.

## 2017-04-06 NOTE — TELEPHONE ENCOUNTER
Patient's medial branch block form was reviewed.  Block number 1 done on 4/3/17.  At rest pain scores: from 8/10 down to 1/10.  Extension/rotation on left: from 6/10 down to 0/10.  Extension/rotation on right: from 8/10 down to 2/10.  Normal activity: from 8/10 down to 2/10.    These results are great     Recommend medial branch block #2    Diagnosis: facet arthropathy

## 2017-04-12 ENCOUNTER — TELEPHONE (OUTPATIENT)
Dept: FAMILY MEDICINE | Facility: CLINIC | Age: 52
End: 2017-04-12

## 2017-04-12 NOTE — TELEPHONE ENCOUNTER
Nurse only encounter 4/6/17documents signing of controlled substance agreement and receipt of RX.    Ethel Jordan, MSN, RN-BC  Care Coordinator  [facility]

## 2017-04-13 ENCOUNTER — OFFICE VISIT (OUTPATIENT)
Dept: PALLIATIVE MEDICINE | Facility: CLINIC | Age: 52
End: 2017-04-13
Payer: COMMERCIAL

## 2017-04-13 DIAGNOSIS — M51.369 DDD (DEGENERATIVE DISC DISEASE), LUMBAR: ICD-10-CM

## 2017-04-13 DIAGNOSIS — G89.4 CHRONIC PAIN SYNDROME: Primary | ICD-10-CM

## 2017-04-13 PROCEDURE — 96152 HC HEALTH AND BEHAVIOR INTERVENTION, INDIVIDUAL, EACH 15 MINUTES: CPT | Performed by: PSYCHOLOGIST

## 2017-04-13 NOTE — PROGRESS NOTES
[facility]   [facility], [place]  Behavioral Medicine Visit    Patient Name: Jailene Breen    YOB: 1965   Medical Record Number: [id number]  Date: 4/13/2017                SUBJECTIVE:  Session objective: 1st appointment post intake. Discussed anxiety and pain  History. Discussed value of running for anxiety management but how having that be only resource for stress reduction limits capacity to cope. Discussed factors of stress and how it actually works in the body and how exercise or mindful exercises accomplish similar things. Discussed overall treatment options and decide to work on development of relaxation strategies for anxiety and stress reduction. Given assignment to review relaxation materials and begin breathing practice as noted in the material. To return in 2-3 weeks        OBJECTIVE:   Length of Visit: 45 minutes      Assessment: Current Emotional / Mental Status    Appearance:   Appropriate   Eye Contact:   Good   Psychomotor Behavior: Normal   Attitude:   Cooperative   Orientation:   All  Speech   Rate / Production: Normal    Volume:  Normal   Mood:    Anxious  Depressed   Affect:    Subdued  Worrisome   Thought Content:  Rumination   Thought Form:  Coherent  Logical   Insight:    Fair     ASSESSMENT:   Axis I:  Lumbral Sacral Spondylosis; Chronic Pain Syndrome Axis II: Dx deferred    Progress toward goals: as expected.   See above    Pain Status: unchanged    Emotional Status: had fluctuating course   Medication / chemical use concerns: None    PLAN:   Next Appointment: Jailene Breen will schedule a follow-up appointment in 2-3 weeks.  Assignment: see above  Objectives / interventions for next session: breathing skills, progressive muscle skills, behavioral skills related to identifying stress    Padilla Keene MA, LP, [facility]  Pain Specialist  [facility] [place]

## 2017-04-13 NOTE — MR AVS SNAPSHOT
After Visit Summary   4/13/2017    Jailene Breen    MRN: [id number]           Patient Information     Date Of Birth          1965        Visit Information        Provider Department      4/13/2017 8:00 AM Padilla Keene LP [facility] [place]        Today's Diagnoses     Chronic pain syndrome    -  1    DDD (degenerative disc disease), lumbar           Follow-ups after your visit        Your next 10 appointments already scheduled     Apr 25, 2017  1:45 PM CDT   Return Visit with Renetta White, PT   [facility] [place] ([facility] [place])    [address]  [place] [address]   [phone]            Apr 25, 2017  3:00 PM CDT   Return Visit with Padilla Keene LP   [facility] [place] ([facility] [place])    [address]  [place] [address]   [phone]            Apr 26, 2017 11:15 AM CDT   Radiology Injections with Silvia Gamble MD   [facility] [place] ([facility] [place])    [address]  [place] [address]   [phone]            May 09, 2017  3:00 PM CDT   Return Visit with Padilla Keene LP   [facility] [place] ([facility] [place])    [address]  [place] [address]   [phone]            Jun 06, 2017  3:00 PM CDT   Return Visit with JUANITA Ga [facility] [place] ([facility] [place])    [address]  [place] [address]   [phone]              Who to contact     If you have questions or need follow up information about today's clinic visit or your schedule please contact [facility] directly at [phone].  Normal or non-critical lab and imaging results will be communicated to you by MyChart, letter or phone within 4 business days after the clinic has received the results. If you do not hear from us within 7 days, please contact the clinic through  [employer] or phone. If you have a critical or abnormal lab result, we will notify you by phone as soon as possible.  Submit refill requests through [employer] or call your pharmacy and they will forward the refill request to us. Please allow 3 business days for your refill to be completed.          Additional Information About Your Visit        [employer] Information     [employer] gives you secure access to your electronic health record. If you see a primary care provider, you can also send messages to your care team and make appointments. If you have questions, please call your primary care clinic.  If you do not have a primary care provider, please call [phone] and they will assist you.        Care EveryWhere ID     This is your Care EveryWhere ID. This could be used by other organizations to access your [place] medical records  [id number]         Blood Pressure from Last 3 Encounters:   04/03/17 141/86   03/31/17 146/88   03/01/17 146/90    Weight from Last 3 Encounters:   03/31/17 98.9 kg (218 lb)   03/01/17 93 kg (205 lb)   02/27/17 93 kg (205 lb)              We Performed the Following     HEALTH & BEHAVIOR ASSESS, INITIAL, EA 15MIN PHD        Primary Care Provider Office Phone # Fax #    JASON Rice [phone]        URGENT CARE [place] [address] [address]        Thank you!     Thank you for choosing [facility]  for your care. Our goal is always to provide you with excellent care. Hearing back from our patients is one way we can continue to improve our services. Please take a few minutes to complete the written survey that you may receive in the mail after your visit with us. Thank you!             Your Updated Medication List - Protect others around you: Learn how to safely use, store and throw away your medicines at www.disposemymeds.org.          This list is accurate as of: 4/13/17 10:43 AM.  Always use your most recent med list.                    Brand Name Dispense Instructions for use    busPIRone 15 MG tablet    BUSPAR    90 tablet    O.5 tabs PO BID for 3 days then 0.5 tab qam and 1 tab qhs for 3 days then 1 tab bid for 3 days.  If still sympotmatic,  may increase to 1 tab QAM and 1.5 tabs at bedtime for 3 days, then 1.5 tabs BID for 3 days, then 1.5 tabs q am and 2 tabs QHS for 3 days, then 2 tabs BID.       * clonazePAM 1 MG tablet    klonoPIN    4 tablet    Take 0.5-1 tablets (0.5-1 mg) by mouth 2 times daily as needed for anxiety       * clonazePAM 1 MG tablet    klonoPIN    4 tablet    Take 0.5-1 tablets (0.5-1 mg) by mouth 2 times daily as needed for anxiety       cyclobenzaprine 5 MG tablet    FLEXERIL     Take 5 mg by mouth Reported on 4/3/2017       DULoxetine 30 MG EC capsule    CYMBALTA    60 capsule    1 tab daily for 1 week then 1 tab BID       FLUoxetine 20 MG capsule    PROzac    90 capsule    Take 3 capsules (60 mg) by mouth daily       folic acid 1 MG tablet    FOLVITE     Reported on 4/3/2017       HUMIRA 40 MG/0.8ML prefilled syringe kit   Generic drug:  adalimumab      Inject 40 mg Subcutaneous Reported on 3/31/2017       hydrOXYzine 25 MG tablet    ATARAX    90 tablet    Take 1-2 tablets (25-50 mg) by mouth every 6 hours as needed for anxiety       IBUPROFEN PO      Take 800 mg by mouth every 6 hours as needed for moderate pain Reported on 3/31/2017       methocarbamol 750 MG tablet    ROBAXIN    60 tablet    Take 1 tablet (750 mg) by mouth 3 times daily as needed for muscle spasms       methotrexate 2.5 MG tablet CHEMO      6 tablets Reported on 4/3/2017       methylPREDNISolone 4 MG tablet    MEDROL DOSEPAK    21 tablet    Follow package instructions       nicotine polacrilex 2 MG gum    NICORETTE     Reported on 3/31/2017       nortriptyline 10 MG capsule    PAMELOR    180 capsule    3 tabs at bedtime. If chronic pain continues, may continue increasing dose 1 additional tab at bedtime every 3 nights until gets to 50 mg and  we can switch to 25 mg tabs.       orphenadrine 100 MG 12 hr tablet    NORFLEX    60 tablet    Take 1 tablet (100 mg) by mouth 2 times daily as needed for muscle spasms or moderate to severe pain       * oxyCODONE 5 MG capsule    OXY-IR     Take 5 mg by mouth every 6 hours as needed for moderate to severe pain Reported on 4/3/2017       * oxyCODONE 10 MG IR tablet    ROXICODONE    21 tablet    Take 1 tablet (10 mg) by mouth every 8 hours as needed for moderate to severe pain       * oxyCODONE 10 MG IR tablet    ROXICODONE    6 tablet    Take 1 tablet (10 mg) by mouth every 8 hours as needed for moderate to severe pain       * oxyCODONE 5 MG IR tablet    ROXICODONE    15 tablet    May take one tablet every 6 hours max of 3 tabs per day and will allow #15 tablets per month. OK to fill and begin on 4/6/2017 and must last 30 days until 5/6/2017.       oxyCODONE-acetaminophen 5-325 MG per tablet    PERCOCET    21 tablet    1 tab BID for 7 days then 1 tab qd       pregabalin 25 MG capsule    LYRICA    90 capsule    Take 25mg at bedtime for 7 days, then 25mg twice daily for 7 days, then take 25mg in the morning and 50mg at bedtime. If side effects, then reduce to last tolerable dosage.       tamsulosin 0.4 MG capsule    FLOMAX    30 capsule    Take 1 capsule (0.4 mg) by mouth daily       * Notice:  This list has 6 medication(s) that are the same as other medications prescribed for you. Read the directions carefully, and ask your doctor or other care provider to review them with you.

## 2017-04-14 ENCOUNTER — MYC MEDICAL ADVICE (OUTPATIENT)
Dept: FAMILY MEDICINE | Facility: CLINIC | Age: 52
End: 2017-04-14

## 2017-04-14 DIAGNOSIS — F17.200 TOBACCO USE DISORDER: Primary | ICD-10-CM

## 2017-04-25 ENCOUNTER — MYC MEDICAL ADVICE (OUTPATIENT)
Dept: PALLIATIVE MEDICINE | Facility: CLINIC | Age: 52
End: 2017-04-25

## 2017-04-25 ENCOUNTER — MYC REFILL (OUTPATIENT)
Dept: PALLIATIVE MEDICINE | Facility: CLINIC | Age: 52
End: 2017-04-25

## 2017-04-25 DIAGNOSIS — G89.4 CHRONIC PAIN SYNDROME: ICD-10-CM

## 2017-04-25 DIAGNOSIS — M47.817 LUMBOSACRAL SPONDYLOSIS WITHOUT MYELOPATHY: ICD-10-CM

## 2017-04-25 DIAGNOSIS — M51.369 DDD (DEGENERATIVE DISC DISEASE), LUMBAR: ICD-10-CM

## 2017-04-25 NOTE — TELEPHONE ENCOUNTER
Message from [employer]:  Original authorizing provider: JUANITA Ga CNP would like a refill of the following medications:  oxyCODONE (ROXICODONE) 5 MG IR tablet [JUANITA Ga CNP]    Preferred pharmacy: [id number] [facility] [address]    Comment:  Requesting now so ready by 5/6/17

## 2017-04-25 NOTE — TELEPHONE ENCOUNTER
Will leave encounter open for a couple of days in anticpation of pt response back.    Cha Molina RN-BSN  [facility]

## 2017-04-25 NOTE — TELEPHONE ENCOUNTER
Received call from patient requesting refill(s) of oxyCODONE (ROXICODONE) 5 MG IR tablet    Last picked up from pharmacy on: 04/06/17    Pt last seen by prescribing provider on 03/31/17  Next appt scheduled for 06/06/17 for office visit, 04/26/17 for injection    Last urine drug screen date 03/31/17  Current opioid agreement on file (completed within the last year) Yes Date of opioid agreement: 04/06/17    Processing (pick one and delete the others):        Pt will  in clinic at [place] location- Would like to  on Thursday, May 4th, as he has appointments in the clinic      Will route to nursing pool for review and preparation of prescription(s).

## 2017-04-25 NOTE — TELEPHONE ENCOUNTER
Sending to MA pool to prep refill.     Sondra Hearn RN, [facility]  Pain Clinic Care Coordinator

## 2017-04-25 NOTE — TELEPHONE ENCOUNTER
Medication refill information reviewed.     Last due:  . OK to fill and begin on 4/6/2017 and must last 30 days until 5/6/2017    Due date:  5/6/17    Weaning instructions:  N/A    Prescriptions prepped for review.     Cha Molina RN-BSN  [facility]

## 2017-04-26 ENCOUNTER — RADIANT APPOINTMENT (OUTPATIENT)
Dept: RADIOLOGY | Facility: CLINIC | Age: 52
End: 2017-04-26
Attending: PSYCHIATRY & NEUROLOGY
Payer: COMMERCIAL

## 2017-04-26 ENCOUNTER — RADIOLOGY INJECTION OFFICE VISIT (OUTPATIENT)
Dept: PALLIATIVE MEDICINE | Facility: CLINIC | Age: 52
End: 2017-04-26
Payer: COMMERCIAL

## 2017-04-26 VITALS — HEART RATE: 69 BPM | DIASTOLIC BLOOD PRESSURE: 80 MMHG | OXYGEN SATURATION: 99 % | SYSTOLIC BLOOD PRESSURE: 141 MMHG

## 2017-04-26 DIAGNOSIS — M47.819 FACET ARTHROPATHY: Primary | ICD-10-CM

## 2017-04-26 DIAGNOSIS — M47.816 SPONDYLOSIS OF LUMBAR REGION WITHOUT MYELOPATHY OR RADICULOPATHY: ICD-10-CM

## 2017-04-26 PROCEDURE — 64494 INJ PARAVERT F JNT L/S 2 LEV: CPT | Mod: RT | Performed by: PSYCHIATRY & NEUROLOGY

## 2017-04-26 PROCEDURE — 64493 INJ PARAVERT F JNT L/S 1 LEV: CPT | Mod: RT | Performed by: PSYCHIATRY & NEUROLOGY

## 2017-04-26 RX ORDER — OXYCODONE HYDROCHLORIDE 5 MG/1
TABLET ORAL
Qty: 15 TABLET | Refills: 0 | Status: SHIPPED | OUTPATIENT
Start: 2017-04-26 | End: 2017-05-31

## 2017-04-26 ASSESSMENT — PAIN SCALES - GENERAL
PAINLEVEL: MILD PAIN (2)
PAINLEVEL: EXTREME PAIN (9)

## 2017-04-26 NOTE — TELEPHONE ENCOUNTER
Signed Prescriptions:                        Disp   Refills    oxyCODONE (ROXICODONE) 5 MG IR tablet      15 tab*0        Sig: May take one tablet every 6 hours max of 3 tabs per           day and will allow #15 tablets per month.           Dispense on/after 5/4/17.  Begin on 5/6/2017 and           must last 30 days.  Authorizing Provider: SUSANA PETTY      Signed script placed in touchdown bin at [facility].    Susana Petty APRN CNP FNP RN  [facility]

## 2017-04-26 NOTE — MR AVS SNAPSHOT
After Visit Summary   4/26/2017    Jailene Breen    MRN: [id number]           Patient Information     Date Of Birth          1965        Visit Information        Provider Department      4/26/2017 11:15 AM Silvia Gamble MD [facility]        Care Instructions    [facility]   Medial Branch Block Discharge Instructions      Your procedure was performed by: Dr. Ashlyn Gamble       Medications used include:  Lidocaine  Bupivicaine  Omnipaque      You will need to complete the Pain Scale Log form and return it to us as soon as possible.  Once we have received the form, we will review it and call you to determine the next steps.     The form can be faxed to [phone] or mailed to:   [facility]   [address]   [place] [address]      You may resume your regular medications    You may resume your regular activities    Be cautious with walking as numbness and/or weakness in the lower extremities may occur for up to 6-8 hours due to the effects of the anesthetic.    Avoid driving for 6 hours. The local anesthetic could slow your reflexes.     You may shower, however no swimming or tub baths or hot tubs for 24 hours following your procedure.    Your pain will return after the numbing medications have worn off.  You may use your current pain medications as needed.    You may use anti-inflammatory medications (such as ibuprofen, aleve, or advil) or Tylenol for pain control if necessary.  Some people find it helpful to alternate ibuprofen and Tylenol every 3 hours for a couple of days.    You may use ice packs 10-15 minutes three to four times a day at the injection site for comfort.     Do not use heat to painful areas for 6 to 8 hours. This will give the local anesthetic time to wear off and prevent you from accidentally burning your skin.     If you experience any of the following, call the pain center nursing line during  work hours at [phone] or the after hours provider line at [phone]:  -Fever over 100 degree F  -Swelling, bleeding, redness, drainage, warmth at the injection site  -Progressive weakness or numbness on your legs   -If lumbar, call if you have a loss of bowel or bladder function  -Unusual new onset of pain that is not improving          Follow-ups after your visit        Your next 10 appointments already scheduled     May 04, 2017  8:00 AM CDT   Return Visit with Padilla Keene LP   [facility] [place] ([facility] [place])    [address] [address]   [phone]            May 04, 2017  9:30 AM CDT   Return Visit with Renetta White PT   [facility] [place] ([facility] [place])    [address] [address]   [phone]            May 09, 2017  3:00 PM CDT   Return Visit with Padilla Keene LP   [facility] [place] ([facility] [place])    [address] [address]   [phone]            Jun 06, 2017  3:00 PM CDT   Return Visit with JUANITA Ga CNP   [facility] ([facility] [place])    [address] [address]   [phone]              Who to contact     If you have questions or need follow up information about today's clinic visit or your schedule please contact [facility] directly at [phone].  Normal or non-critical lab and imaging results will be communicated to you by MyChart, letter or phone within 4 business days after the clinic has received the results. If you do not hear from us within 7 days, please contact the clinic through MyChart or phone. If you have a critical or abnormal lab result, we will notify you by phone as soon as possible.  Submit refill requests through [employer] or call your pharmacy and they will forward the refill request to us. Please allow 3 business days for  your refill to be completed.          Additional Information About Your Visit        [employer] Information     [employer] gives you secure access to your electronic health record. If you see a primary care provider, you can also send messages to your care team and make appointments. If you have questions, please call your primary care clinic.  If you do not have a primary care provider, please call [phone] and they will assist you.        Care EveryWhere ID     This is your Care EveryWhere ID. This could be used by other organizations to access your [place] medical records  [id number]        Your Vitals Were     Pulse Pulse Oximetry                69 99%           Blood Pressure from Last 3 Encounters:   04/26/17 141/80   04/03/17 141/86   03/31/17 146/88    Weight from Last 3 Encounters:   03/31/17 98.9 kg (218 lb)   03/01/17 93 kg (205 lb)   02/27/17 93 kg (205 lb)              Today, you had the following     No orders found for display       Primary Care Provider Office Phone # Fax #    JASON Rice [phone]        URGENT CARE [place] [address] [address]        Thank you!     Thank you for choosing [facility]  for your care. Our goal is always to provide you with excellent care. Hearing back from our patients is one way we can continue to improve our services. Please take a few minutes to complete the written survey that you may receive in the mail after your visit with us. Thank you!             Your Updated Medication List - Protect others around you: Learn how to safely use, store and throw away your medicines at www.disposemymeds.org.          This list is accurate as of: 4/26/17 11:36 AM.  Always use your most recent med list.                   Brand Name Dispense Instructions for use    busPIRone 15 MG tablet    BUSPAR    90 tablet    O.5 tabs PO BID for 3 days then 0.5 tab qam and 1 tab qhs for 3 days then 1 tab bid for 3 days.  If  still sympotmatic,  may increase to 1 tab QAM and 1.5 tabs at bedtime for 3 days, then 1.5 tabs BID for 3 days, then 1.5 tabs q am and 2 tabs QHS for 3 days, then 2 tabs BID.       * clonazePAM 1 MG tablet    klonoPIN    4 tablet    Take 0.5-1 tablets (0.5-1 mg) by mouth 2 times daily as needed for anxiety       * clonazePAM 1 MG tablet    klonoPIN    4 tablet    Take 0.5-1 tablets (0.5-1 mg) by mouth 2 times daily as needed for anxiety       cyclobenzaprine 5 MG tablet    FLEXERIL     Take 5 mg by mouth Reported on 4/26/2017       DULoxetine 30 MG EC capsule    CYMBALTA    60 capsule    1 tab daily for 1 week then 1 tab BID       FLUoxetine 20 MG capsule    PROzac    90 capsule    Take 3 capsules (60 mg) by mouth daily       folic acid 1 MG tablet    FOLVITE     Reported on 4/26/2017       HUMIRA 40 MG/0.8ML prefilled syringe kit   Generic drug:  adalimumab      Inject 40 mg Subcutaneous Reported on 3/31/2017       hydrOXYzine 25 MG tablet    ATARAX    90 tablet    Take 1-2 tablets (25-50 mg) by mouth every 6 hours as needed for anxiety       IBUPROFEN PO      Take 800 mg by mouth every 6 hours as needed for moderate pain Reported on 3/31/2017       methocarbamol 750 MG tablet    ROBAXIN    60 tablet    Take 1 tablet (750 mg) by mouth 3 times daily as needed for muscle spasms       methotrexate 2.5 MG tablet CHEMO      6 tablets Reported on 4/3/2017       methylPREDNISolone 4 MG tablet    MEDROL DOSEPAK    21 tablet    Follow package instructions       * nicotine polacrilex 2 MG gum    NICORETTE     Reported on 3/31/2017       * nicotine polacrilex 2 MG gum    EQ NICOTINE POLACRILEX    40 tablet    Place 1 each (2 mg) inside cheek as needed for smoking cessation       nortriptyline 10 MG capsule    PAMELOR    180 capsule    3 tabs at bedtime. If chronic pain continues, may continue increasing dose 1 additional tab at bedtime every 3 nights until gets to 50 mg and we can switch to 25 mg tabs.       orphenadrine 100  MG 12 hr tablet    NORFLEX    60 tablet    Take 1 tablet (100 mg) by mouth 2 times daily as needed for muscle spasms or moderate to severe pain       * oxyCODONE 5 MG capsule    OXY-IR     Take 5 mg by mouth every 6 hours as needed for moderate to severe pain Reported on 4/3/2017       * oxyCODONE 10 MG IR tablet    ROXICODONE    21 tablet    Take 1 tablet (10 mg) by mouth every 8 hours as needed for moderate to severe pain       * oxyCODONE 10 MG IR tablet    ROXICODONE    6 tablet    Take 1 tablet (10 mg) by mouth every 8 hours as needed for moderate to severe pain       * oxyCODONE 5 MG IR tablet    ROXICODONE    15 tablet    May take one tablet every 6 hours max of 3 tabs per day and will allow #15 tablets per month. Dispense on/after 5/4/17.  Begin on 5/6/2017 and must last 30 days.       oxyCODONE-acetaminophen 5-325 MG per tablet    PERCOCET    21 tablet    1 tab BID for 7 days then 1 tab qd       pregabalin 25 MG capsule    LYRICA    90 capsule    Take 25mg at bedtime for 7 days, then 25mg twice daily for 7 days, then take 25mg in the morning and 50mg at bedtime. If side effects, then reduce to last tolerable dosage.       tamsulosin 0.4 MG capsule    FLOMAX    30 capsule    Take 1 capsule (0.4 mg) by mouth daily       * Notice:  This list has 8 medication(s) that are the same as other medications prescribed for you. Read the directions carefully, and ask your doctor or other care provider to review them with you.

## 2017-04-26 NOTE — NURSING NOTE
"Chief Complaint   Patient presents with     Pain       Initial BP (!) 157/94  Pulse 81 Estimated body mass index is 26.54 kg/(m^2) as calculated from the following:    Height as of 3/31/17: 1.93 m (6' 4\").    Weight as of 3/31/17: 98.9 kg (218 lb).  Medication Reconciliation: complete    Injection intake:    If this procedure is requiring IV sedation has patient been NPO for 6  Hours? NA    Is patient on coumadin, plavix or other prescribed blood thinner?   No    If patient is on coumadin was it held for 5 days?   NA    If patient is on plavix was it held for 7 days?    NA     Does patient take aspirin?  No    If this is for a cervical procedure and patient is on aspirin has it been held for 6 days?   NA    Any allergies to contrast dye, iodine, steroid and/or numbing medications?  NO    Is patient currently taking antibiotics or have an active infection?  NO    Does patient have a ? Yes       Is patient pregnant or breastfeeding?  Not Applicable    Are the vital signs normal?  Yes    ,jyeison      "

## 2017-04-26 NOTE — NURSING NOTE
Discharge Information    IV Discontiued Time:  NA    Amount of Fluid Infused:  NA    Discharge Criteria = When patient returns to baseline or as per MD order    Consciousness:  Pt is fully awake    Circulation:  BP +/- 20% of pre-procedure level    Respiration:  Patient is able to breathe deeply    O2 Sat:  Patient is able to maintain O2 Sat >92% on room air    Activity:  Moves 4 extremities on command    Ambulation:  Patient is able to stand and walk or stand and pivot into wheelchair    Dressing:  Clean/dry or No Dressing    Notes:   Discharge instructions and AVS given to patient    Patient meets criteria for discharge?  YES    Admitted to PCU?  No    Responsible adult present to accompany patient home?  Yes    Signature/Title:    Rosetta Hearn RN Care Coordinator  [facility] [place]

## 2017-04-26 NOTE — TELEPHONE ENCOUNTER
Will not allow early refills. We discussed using pain pills rarely over the course of a month and that the tabs he receives should last 30 days.    Susana GRANT RN CNP, FNP  [place] [facility] [place]

## 2017-04-26 NOTE — PATIENT INSTRUCTIONS
[facility]   Medial Branch Block Discharge Instructions      Your procedure was performed by: Dr. Ashlyn Gamble       Medications used include:  Lidocaine  Bupivicaine  Omnipaque      You will need to complete the Pain Scale Log form and return it to us as soon as possible.  Once we have received the form, we will review it and call you to determine the next steps.     The form can be faxed to [phone] or mailed to:   [facility]   [address]   [place] [address]      You may resume your regular medications    You may resume your regular activities    Be cautious with walking as numbness and/or weakness in the lower extremities may occur for up to 6-8 hours due to the effects of the anesthetic.    Avoid driving for 6 hours. The local anesthetic could slow your reflexes.     You may shower, however no swimming or tub baths or hot tubs for 24 hours following your procedure.    Your pain will return after the numbing medications have worn off.  You may use your current pain medications as needed.    You may use anti-inflammatory medications (such as ibuprofen, aleve, or advil) or Tylenol for pain control if necessary.  Some people find it helpful to alternate ibuprofen and Tylenol every 3 hours for a couple of days.    You may use ice packs 10-15 minutes three to four times a day at the injection site for comfort.     Do not use heat to painful areas for 6 to 8 hours. This will give the local anesthetic time to wear off and prevent you from accidentally burning your skin.     If you experience any of the following, call the pain center nursing line during work hours at [phone] or the after hours provider line at [phone]:  -Fever over 100 degree F  -Swelling, bleeding, redness, drainage, warmth at the injection site  -Progressive weakness or numbness on your legs   -If lumbar, call if you have a loss of bowel or bladder function  -Unusual new onset  of pain that is not improving

## 2017-04-26 NOTE — PROGRESS NOTES
Pre procedure Diagnosis: facet arthropathy   Post procedure Diagnosis: Same  Procedure performed: right lumbar medial branch block #2  Anesthesia: none  Complications: none  Operators: Ashlyn Gamble MD    Indications:   Jailene Breen is a 51 year old male was sent by Susana Pike NP for lumbar medial branch blocks.  They have a history of RA and AS.  Exam shows pain with extension/rotation, lumbar paraspinal tenderness and they have tried conservative treatment including medications, injections. He plans on starting PT here, but has done PT at [facility]- and was discharged due to significant pain.    Options/alternatives, benefits and risks were discussed with the patient including bleeding, infection, tissue trauma, exposure to radiation, reaction to medications, spinal cord injury, weakness, numbness and paralysis.  Questions were answered to his satisfaction and he agrees to proceed. Voluntary informed consent was obtained and signed.     Vitals were reviewed: Yes  Allergies were reviewed:  Yes  Medications were reviewed:  Yes  Pre-procedure pain score: 10/10    Procedure:  After getting informed consent, patient was brought into the procedure suite and was placed in a prone position on the procedure table.   A Pause for the Cause was performed.  Patient was prepped and draped in sterile fashion.     Under AP fluoroscopic guidance the L3, L4, L5 vertebral bodies were identified. The C-arm was rotated to the oblique view to afford optimal visualization the pedicles.  Lidocaine 1% was used to anesthetize the skin at each level.  Under intermittent fluoroscopy, 25G 3.5inch Quinke spinal needles were positioned inferior and lateral to the intersection of the transverse process and pedicle at the Right L4 & L5 levels, as well as the corresponding sacral alar notch. The needle positions were verified and optimized from the AP view.    The anatomic targets for the L3 & L4 medial nerve and L5 dorsal  ramus (which functionally incorporates the medial branch) were the  L4 & L5 transverse processes and sacral alar notch, with laterality as described above, resulting in blockade of the L4/5 and L5/S1 facet joints.    Omnipaque-300 was injected, and appropriate spread of contrast was noted without vascular uptake.  A total of 1ml of Omnipaque was used.  9ml was wasted. Bupivacaine 0.5% 0.5 ml was injected at each location.  The needles were removed.      Hemostasis was achieved, the area was cleaned, and bandaids were placed when appropriate.  The patient tolerated the procedure well, and was taken to the recovery room.    Images were saved to PACS.    The patient will continue to monitor progress, and they were given a pain diary to complete at home.  They will either fax or mail this back to us or bring it to their next appointment. We will determine the treatment plan after we review the diary.      Post-procedure pain score: 2/10  Follow-up includes:   -f/u phone call in one week  -will await diary for further planning.    Ashlyn Gamble MD  [facility]

## 2017-04-27 ENCOUNTER — TELEPHONE (OUTPATIENT)
Dept: PALLIATIVE MEDICINE | Facility: CLINIC | Age: 52
End: 2017-04-27

## 2017-04-27 ENCOUNTER — MYC MEDICAL ADVICE (OUTPATIENT)
Dept: PALLIATIVE MEDICINE | Facility: CLINIC | Age: 52
End: 2017-04-27

## 2017-04-27 NOTE — TELEPHONE ENCOUNTER
MEDICA RFA REQUIREMENTS- NO PA REQUIRED      [employer]  o 6 months failed conservative treatment (meds, injections, etc.);   o 4 weeks of PT within the last 6 months (or an unfavorable evaluation);   o at least 1 successful workup resulting in significant pain relief      Yasemin G.    [facility]

## 2017-04-27 NOTE — TELEPHONE ENCOUNTER
Left voicemail for patient to schedule LRFA.     Deyanira MOLINA    [facility] [place]

## 2017-04-27 NOTE — TELEPHONE ENCOUNTER
Pre-screening Questions for RFA Procedure      Procedure ordered? Lumbar RFA    What insurance are we billing for this procedure?  [employer]    Has Pt had this injection before? Yes: Not in the same area  Is  Needed?: No  Will Pt have a ?  Yes if pt is given sedation meds, no driving for 24 hours.  Is pt taking a cab or transportation service?   No   If so will need to be accompanied by an adult too (friend/family member) in order for IV sedation to be given.    Per [facility] Policy:  Outpatients are to have responsible adult or family member to accompany them at discharge and drive them home. A service providing medically trained drivers or attendants would be acceptable. Public transportation would not be acceptable unless the patient is accompanied by a responsible adult or family member.    Is Pt aware to be NPO for 6 hours before procedure?  YES   Is patient taking any blood thinners (plavix, coumadin, jantoven, warfarin, heparin, pradaxa or dabigatran )? No   (If so, do not schedule, contact RN and/or MD)     Is patient taking any aspirin products? No   (If more than 325mg/day do not schedule; Contact RN/MD. For all non-cervical interventional procedures if patient is taking MORE than 325mg/day, limit aspirin to 81-325mg/day x 1 week. No hold required day of procedure.  For CERVICAL procedures, hold all aspirin products for 6 days.)      Take normal medications with sips of water, except for blood thinners, especially blood pressure medications.  Yes  Does Pt have an allergy to contrast dye, iodine or shellfish?  No  Does the patient have a bleeding or clotting disorder? No   (If yes, okay to schedule, but contact RN/MD).  **For any patients with platelet count <100, must be forwarded to provider**    Does patient have an active infection or treated for one within the past week? No  (If YES, do NOT schedule and route to RN)     Is patient currently taking any antibiotics?  No  (If YES, do NOT  schedule and route to RN)  For patients on chronic, preventative, or prophylactic antibiotics, procedures can be scheduled.   For patients on antibiotics for active or recent infection:  Simón Quinones [place]-antibiotic course must have been completed for 4 days  Nikki Olea-antibiotic course must have been completed for 7 days    Is patient currently taking any steroid medications? (i.e. Prednisone, Medrol)  No   For patients on steroid medications:  Simón Quinones [place]-steroid course must have been completed for 4 days  Nikki Olea-steroid course must have been completed for 7 days    Review with patient:  If you are started on any steroids or antibiotics between now and your appointment, you must contact us because it may affect our ability to perform your procedure REVIEWED    Is patient actively being treated for cancer or immunocompromised, including the spleen having been removed? No  **For Dr. Ogden patients without spleens should have the chart sent to her**  (If YES, do NOT schedule and route to RN)    Are you able to get on and off an exam table with minimal or no assistance? Yes  (If NO, do NOT schedule and route to RN)  Are you able to roll over and lay on your stomach with minimal or no assistance? Yes  (If NO, do NOT schedule and route to RN)        Any chance of Pregnancy?  N/A  Pt needs to arrive 30 minutes before procedure time to have an IV inserted.  reviewed  Do NOT schedule at 0745, 0815 or 1245.  reviewed   All radiofrequency ablations are in a 90 minute time slot except genicular radiofrequency ablation those are 60 minute time slot.  reviewed   Any hx of complications with sedation medications?   NO  Hx of sleep apnea?  NO  Cardiac hx? NO      Pt to expect increased pain for up to 2 weeks after procedure.    Relief from this procedure can take up to 6 weeks.      When you schedule an RFA for [place], e-mail Flo Modi the date, time, and who they are  seeing. Also Cc Ida Larsen,Anna Giles, and the provider that they are scheduled with. REVIEWED    In [place] there are only 6 probes for each area (lumbar and cervical) with a 72 hour autoclave. Make sure that there are at least 3 days between LRFA s and 3 days between CRFA s. REVIEWED    Elana Maldonado    [facility]

## 2017-04-27 NOTE — TELEPHONE ENCOUNTER
Routed to Yasemin to check insurance coverage for R-sided lumbar radiofrequency ablation.  The anatomic targets for the L3 & L4 medial nerve and L5 dorsal ramus (which functionally incorporates the medial branch) were the L4 & L5 transverse processes and sacral alar notch, with laterality as described above, resulting in blockade of the L4/5 and L5/S1 facet joints.. Dx Facet arthropathy 721.9. For medicare patients use 716.98     Max % of pain relief from blocks:    R side medial branch block #1:  75%    R sidelumbar medial branch block #2:  80%    Pt had a R-side lumbar medial branch block # 2 on 4/26/17.  The post medial branch block form was   received.    Called pt and informed him/her that insurance would be checked and will be called once we get a response.  Done via [employer]    The pain diary was faxed to Yasemin for insurance review.    The post procedure form was placed in Dr. Gamble's bin for review.    Cha Molina RN-BSN  [facility]

## 2017-05-01 NOTE — TELEPHONE ENCOUNTER
Patient's medial branch block form was reviewed.  Block number 2 done on 4/26/17.  At rest pain scores: from 8/10 down to 2/10.  Extension/rotation on right: from 10/10 down to 2/10.    These results are great     Recommend radiofrequency ablation   Diagnosis: facet arthropathy    Ashlyn Gamble MD  [facility]

## 2017-05-04 ENCOUNTER — OFFICE VISIT (OUTPATIENT)
Dept: PALLIATIVE MEDICINE | Facility: CLINIC | Age: 52
End: 2017-05-04
Payer: COMMERCIAL

## 2017-05-04 DIAGNOSIS — M51.369 DDD (DEGENERATIVE DISC DISEASE), LUMBAR: ICD-10-CM

## 2017-05-04 DIAGNOSIS — M45.9 ANKYLOSING SPONDYLITIS (H): ICD-10-CM

## 2017-05-04 DIAGNOSIS — M47.816 SPONDYLOSIS OF LUMBAR REGION WITHOUT MYELOPATHY OR RADICULOPATHY: ICD-10-CM

## 2017-05-04 DIAGNOSIS — M79.18 MYOFASCIAL PAIN: ICD-10-CM

## 2017-05-04 DIAGNOSIS — M06.9 RHEUMATOID ARTHRITIS, INVOLVING UNSPECIFIED SITE, UNSPECIFIED RHEUMATOID FACTOR PRESENCE: ICD-10-CM

## 2017-05-04 DIAGNOSIS — M47.817 LUMBOSACRAL SPONDYLOSIS WITHOUT MYELOPATHY: Primary | ICD-10-CM

## 2017-05-04 DIAGNOSIS — G89.4 CHRONIC PAIN SYNDROME: Primary | ICD-10-CM

## 2017-05-04 PROCEDURE — 96152 HC HEALTH AND BEHAVIOR INTERVENTION, INDIVIDUAL, EACH 15 MINUTES: CPT | Performed by: PSYCHOLOGIST

## 2017-05-04 PROCEDURE — 97110 THERAPEUTIC EXERCISES: CPT | Performed by: PHYSICAL THERAPIST

## 2017-05-04 PROCEDURE — 97530 THERAPEUTIC ACTIVITIES: CPT | Performed by: PHYSICAL THERAPIST

## 2017-05-04 PROCEDURE — 97112 NEUROMUSCULAR REEDUCATION: CPT | Performed by: PHYSICAL THERAPIST

## 2017-05-04 NOTE — MR AVS SNAPSHOT
After Visit Summary   5/4/2017    Jailene Breen    MRN: [id number]           Patient Information     Date Of Birth          1965        Visit Information        Provider Department      5/4/2017 8:00 AM Padilla Keene LP [facility]        Today's Diagnoses     Chronic pain syndrome    -  1    Spondylosis of lumbar region without myelopathy or radiculopathy        DDD (degenerative disc disease), lumbar        Rheumatoid arthritis, involving unspecified site, unspecified rheumatoid factor presence (H)           Follow-ups after your visit        Your next 10 appointments already scheduled     May 04, 2017  9:30 AM CDT   Return Visit with Renetta White, PT   [facility] [place] ([facility] [place])    [address]  [place] [address]   [phone]            May 09, 2017  3:00 PM CDT   Return Visit with Padilla Keene LP   [facility] [place] ([facility] [place])    [address]  [place] [address]   [phone]            May 31, 2017  9:15 AM CDT   Radiology Injections with Silvia Gamble MD   [facility] ([facility] [place])    [address]  [place] [address]   [phone]            Jun 06, 2017  3:00 PM CDT   Return Visit with JAUNITA Ga CNP   [facility] ([facility] [place])    [address] [address]   [phone]              Who to contact     If you have questions or need follow up information about today's clinic visit or your schedule please contact [facility] directly at [phone].  Normal or non-critical lab and imaging results will be communicated to you by MyChart, letter or phone within 4 business days after the clinic has received the results. If you do not hear from us within 7 days, please contact the clinic through MyChart or phone. If you have a  critical or abnormal lab result, we will notify you by phone as soon as possible.  Submit refill requests through [employer] or call your pharmacy and they will forward the refill request to us. Please allow 3 business days for your refill to be completed.          Additional Information About Your Visit        [employer] Information     [employer] gives you secure access to your electronic health record. If you see a primary care provider, you can also send messages to your care team and make appointments. If you have questions, please call your primary care clinic.  If you do not have a primary care provider, please call [phone] and they will assist you.        Care EveryWhere ID     This is your Care EveryWhere ID. This could be used by other organizations to access your [facility] medical records  [id number]         Blood Pressure from Last 3 Encounters:   04/26/17 141/80   04/03/17 141/86   03/31/17 146/88    Weight from Last 3 Encounters:   03/31/17 98.9 kg (218 lb)   03/01/17 93 kg (205 lb)   02/27/17 93 kg (205 lb)              We Performed the Following     HEALTH & BEHAVIOR ASSESS, INITIAL, EA 15MIN PHD        Primary Care Provider Office Phone # Fax #    JASON Rice [phone]        URGENT [facility]ARE [place] [address] [address]        Thank you!     Thank you for choosing [facility]  for your care. Our goal is always to provide you with excellent care. Hearing back from our patients is one way we can continue to improve our services. Please take a few minutes to complete the written survey that you may receive in the mail after your visit with us. Thank you!             Your Updated Medication List - Protect others around you: Learn how to safely use, store and throw away your medicines at www.disposemymeds.org.          This list is accurate as of: 5/4/17  9:20 AM.  Always use your most recent med list.                   Brand Name Dispense  Instructions for use    busPIRone 15 MG tablet    BUSPAR    90 tablet    O.5 tabs PO BID for 3 days then 0.5 tab qam and 1 tab qhs for 3 days then 1 tab bid for 3 days.  If still sympotmatic,  may increase to 1 tab QAM and 1.5 tabs at bedtime for 3 days, then 1.5 tabs BID for 3 days, then 1.5 tabs q am and 2 tabs QHS for 3 days, then 2 tabs BID.       * clonazePAM 1 MG tablet    klonoPIN    4 tablet    Take 0.5-1 tablets (0.5-1 mg) by mouth 2 times daily as needed for anxiety       * clonazePAM 1 MG tablet    klonoPIN    4 tablet    Take 0.5-1 tablets (0.5-1 mg) by mouth 2 times daily as needed for anxiety       cyclobenzaprine 5 MG tablet    FLEXERIL     Take 5 mg by mouth Reported on 4/26/2017       DULoxetine 30 MG EC capsule    CYMBALTA    60 capsule    1 tab daily for 1 week then 1 tab BID       FLUoxetine 20 MG capsule    PROzac    90 capsule    Take 3 capsules (60 mg) by mouth daily       folic acid 1 MG tablet    FOLVITE     Reported on 4/26/2017       HUMIRA 40 MG/0.8ML prefilled syringe kit   Generic drug:  adalimumab      Inject 40 mg Subcutaneous Reported on 3/31/2017       hydrOXYzine 25 MG tablet    ATARAX    90 tablet    Take 1-2 tablets (25-50 mg) by mouth every 6 hours as needed for anxiety       IBUPROFEN PO      Take 800 mg by mouth every 6 hours as needed for moderate pain Reported on 3/31/2017       methocarbamol 750 MG tablet    ROBAXIN    60 tablet    Take 1 tablet (750 mg) by mouth 3 times daily as needed for muscle spasms       methotrexate 2.5 MG tablet CHEMO      6 tablets Reported on 4/3/2017       methylPREDNISolone 4 MG tablet    MEDROL DOSEPAK    21 tablet    Follow package instructions       * nicotine polacrilex 2 MG gum    NICORETTE     Reported on 3/31/2017       * nicotine polacrilex 2 MG gum    EQ NICOTINE POLACRILEX    40 tablet    Place 1 each (2 mg) inside cheek as needed for smoking cessation       nortriptyline 10 MG capsule    PAMELOR    180 capsule    3 tabs at bedtime. If  chronic pain continues, may continue increasing dose 1 additional tab at bedtime every 3 nights until gets to 50 mg and we can switch to 25 mg tabs.       orphenadrine 100 MG 12 hr tablet    NORFLEX    60 tablet    Take 1 tablet (100 mg) by mouth 2 times daily as needed for muscle spasms or moderate to severe pain       * oxyCODONE 5 MG capsule    OXY-IR     Take 5 mg by mouth every 6 hours as needed for moderate to severe pain Reported on 4/3/2017       * oxyCODONE 10 MG IR tablet    ROXICODONE    21 tablet    Take 1 tablet (10 mg) by mouth every 8 hours as needed for moderate to severe pain       * oxyCODONE 10 MG IR tablet    ROXICODONE    6 tablet    Take 1 tablet (10 mg) by mouth every 8 hours as needed for moderate to severe pain       * oxyCODONE 5 MG IR tablet    ROXICODONE    15 tablet    May take one tablet every 6 hours max of 3 tabs per day and will allow #15 tablets per month. Dispense on/after 5/4/17.  Begin on 5/6/2017 and must last 30 days.       oxyCODONE-acetaminophen 5-325 MG per tablet    PERCOCET    21 tablet    1 tab BID for 7 days then 1 tab qd       pregabalin 25 MG capsule    LYRICA    90 capsule    Take 25mg at bedtime for 7 days, then 25mg twice daily for 7 days, then take 25mg in the morning and 50mg at bedtime. If side effects, then reduce to last tolerable dosage.       tamsulosin 0.4 MG capsule    FLOMAX    30 capsule    Take 1 capsule (0.4 mg) by mouth daily       * Notice:  This list has 8 medication(s) that are the same as other medications prescribed for you. Read the directions carefully, and ask your doctor or other care provider to review them with you.

## 2017-05-04 NOTE — MR AVS SNAPSHOT
After Visit Summary   5/4/2017    Jailene Breen    MRN: [id number]           Patient Information     Date Of Birth          1965        Visit Information        Provider Department      5/4/2017 9:30 AM Renetta White, PT [facility]        Today's Diagnoses     Lumbosacral spondylosis without myelopathy    -  1    DDD (degenerative disc disease), lumbar        Myofascial pain        Ankylosing spondylitis (H)           Follow-ups after your visit        Your next 10 appointments already scheduled     May 09, 2017  3:00 PM CDT   Return Visit with Padilla Keene LP   [facility] [place] ([facility] [place])    [address]  [place] [address]   [phone]            May 18, 2017  8:00 AM CDT   Return Visit with Padilla Keene LP   [facility] [place] ([facility] [place])    [address]  [place] [address]   [phone]            May 18, 2017  9:30 AM CDT   Return Visit with Renetta White PT   [facility] [place] ([facility] [place])    [address]  [place] [address]   [phone]            May 31, 2017  9:15 AM CDT   Radiology Injections with Silvia Gamble MD   [facility] [place] ([facility] [place])    [address]  [place] [address]   [phone]            Jun 06, 2017  3:00 PM CDT   Return Visit with JUANITA Ga [facility] [place] ([facility] [place])    [address]  [place] [address]   [phone]              Who to contact     If you have questions or need follow up information about today's clinic visit or your schedule please contact [facility] directly at [phone].  Normal or non-critical lab and imaging results will be communicated to you by MyChart, letter or phone within 4 business days after the clinic has received the results.  If you do not hear from us within 7 days, please contact the clinic through [employer] or phone. If you have a critical or abnormal lab result, we will notify you by phone as soon as possible.  Submit refill requests through [employer] or call your pharmacy and they will forward the refill request to us. Please allow 3 business days for your refill to be completed.          Additional Information About Your Visit        [employer] Information     [employer] gives you secure access to your electronic health record. If you see a primary care provider, you can also send messages to your care team and make appointments. If you have questions, please call your primary care clinic.  If you do not have a primary care provider, please call [phone] and they will assist you.        Care EveryWhere ID     This is your Care EveryWhere ID. This could be used by other organizations to access your [facility] medical records  [id number]         Blood Pressure from Last 3 Encounters:   04/26/17 141/80   04/03/17 141/86   03/31/17 146/88    Weight from Last 3 Encounters:   03/31/17 98.9 kg (218 lb)   03/01/17 93 kg (205 lb)   02/27/17 93 kg (205 lb)              We Performed the Following     NEUROMUSCULAR RE-EDUCATION     THERAPEUTIC ACTIVITIES     THERAPEUTIC EXERCISES        Primary Care Provider Office Phone # Fax #    JASON Rice [phone]       [facility] [place] [address] [address] [address]        Thank you!     Thank you for choosing [facility]  for your care. Our goal is always to provide you with excellent care. Hearing back from our patients is one way we can continue to improve our services. Please take a few minutes to complete the written survey that you may receive in the mail after your visit with us. Thank you!             Your Updated Medication List - Protect others around you: Learn how to safely use, store and throw away your medicines at  www.disposemymeds.org.          This list is accurate as of: 5/4/17 12:19 PM.  Always use your most recent med list.                   Brand Name Dispense Instructions for use    busPIRone 15 MG tablet    BUSPAR    90 tablet    O.5 tabs PO BID for 3 days then 0.5 tab qam and 1 tab qhs for 3 days then 1 tab bid for 3 days.  If still sympotmatic,  may increase to 1 tab QAM and 1.5 tabs at bedtime for 3 days, then 1.5 tabs BID for 3 days, then 1.5 tabs q am and 2 tabs QHS for 3 days, then 2 tabs BID.       * clonazePAM 1 MG tablet    klonoPIN    4 tablet    Take 0.5-1 tablets (0.5-1 mg) by mouth 2 times daily as needed for anxiety       * clonazePAM 1 MG tablet    klonoPIN    4 tablet    Take 0.5-1 tablets (0.5-1 mg) by mouth 2 times daily as needed for anxiety       cyclobenzaprine 5 MG tablet    FLEXERIL     Take 5 mg by mouth Reported on 4/26/2017       DULoxetine 30 MG EC capsule    CYMBALTA    60 capsule    1 tab daily for 1 week then 1 tab BID       FLUoxetine 20 MG capsule    PROzac    90 capsule    Take 3 capsules (60 mg) by mouth daily       folic acid 1 MG tablet    FOLVITE     Reported on 4/26/2017       HUMIRA 40 MG/0.8ML prefilled syringe kit   Generic drug:  adalimumab      Inject 40 mg Subcutaneous Reported on 3/31/2017       hydrOXYzine 25 MG tablet    ATARAX    90 tablet    Take 1-2 tablets (25-50 mg) by mouth every 6 hours as needed for anxiety       IBUPROFEN PO      Take 800 mg by mouth every 6 hours as needed for moderate pain Reported on 3/31/2017       methocarbamol 750 MG tablet    ROBAXIN    60 tablet    Take 1 tablet (750 mg) by mouth 3 times daily as needed for muscle spasms       methotrexate 2.5 MG tablet CHEMO      6 tablets Reported on 4/3/2017       methylPREDNISolone 4 MG tablet    MEDROL DOSEPAK    21 tablet    Follow package instructions       * nicotine polacrilex 2 MG gum    NICORETTE     Reported on 3/31/2017       * nicotine polacrilex 2 MG gum    EQ NICOTINE POLACRILEX    40  tablet    Place 1 each (2 mg) inside cheek as needed for smoking cessation       nortriptyline 10 MG capsule    PAMELOR    180 capsule    3 tabs at bedtime. If chronic pain continues, may continue increasing dose 1 additional tab at bedtime every 3 nights until gets to 50 mg and we can switch to 25 mg tabs.       orphenadrine 100 MG 12 hr tablet    NORFLEX    60 tablet    Take 1 tablet (100 mg) by mouth 2 times daily as needed for muscle spasms or moderate to severe pain       * oxyCODONE 5 MG capsule    OXY-IR     Take 5 mg by mouth every 6 hours as needed for moderate to severe pain Reported on 4/3/2017       * oxyCODONE 10 MG IR tablet    ROXICODONE    21 tablet    Take 1 tablet (10 mg) by mouth every 8 hours as needed for moderate to severe pain       * oxyCODONE 10 MG IR tablet    ROXICODONE    6 tablet    Take 1 tablet (10 mg) by mouth every 8 hours as needed for moderate to severe pain       * oxyCODONE 5 MG IR tablet    ROXICODONE    15 tablet    May take one tablet every 6 hours max of 3 tabs per day and will allow #15 tablets per month. Dispense on/after 5/4/17.  Begin on 5/6/2017 and must last 30 days.       oxyCODONE-acetaminophen 5-325 MG per tablet    PERCOCET    21 tablet    1 tab BID for 7 days then 1 tab qd       pregabalin 25 MG capsule    LYRICA    90 capsule    Take 25mg at bedtime for 7 days, then 25mg twice daily for 7 days, then take 25mg in the morning and 50mg at bedtime. If side effects, then reduce to last tolerable dosage.       tamsulosin 0.4 MG capsule    FLOMAX    30 capsule    Take 1 capsule (0.4 mg) by mouth daily       * Notice:  This list has 8 medication(s) that are the same as other medications prescribed for you. Read the directions carefully, and ask your doctor or other care provider to review them with you.

## 2017-05-04 NOTE — PROGRESS NOTES
[facility]   [facility] [place]  Behavioral Medicine Visit    Patient Name: Jailene Breen    YOB: 1965   Medical Record Number: [id number]  Date: 5/4/2017                SUBJECTIVE:  Session objective: 2nd session post intake. High perfection, high self judgment. Quite skilled with art but self critical. Able to appreciate things that calm him but does not see how to move toward those when he is high pain or he is mentally agitated. His life stressors remain quite high, money, unemployment, pain. He appears to ruminate about shortcomings, look for an escape, get relief but then avoid addressing life problems in a more realistic way. His medical care needs are very expensive.    Reports pain is much worse. Acknowledges it may have been self inflicted. Does not appear at present to take skill/stress reduction/calming benefit of running into other more helpful coping domains.          OBJECTIVE:   Length of Visit: 60 minutes      Assessment: Current Emotional / Mental Status    Appearance:   Appropriate   Eye Contact:   Good   Psychomotor Behavior: Normal   Attitude:   Cooperative   Orientation:   All  Speech   Rate / Production: Hyperverbal  Normal    Volume:  Normal   Mood:    Anxious  Sad   Affect:    Subdued  Worrisome   Thought Content:  Perservative  Rumination   Thought Form:  Coherent  Logical   Insight:    Poor     ASSESSMENT:   Axis I: Chronic Pain Syndrome Axis II: Dx deferred    Progress toward goals: as expected.   See above   Pain Status: worsened    Emotional Status: improved   Medication / chemical use concerns: None    PLAN:   Next Appointment: Jailene Breen will schedule a follow-up appointment in 2 weeks.  Assignment: Work on developing alternatives to running to provide stress reduction. 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 3) To consider another option of how to move mentally, physically or artistically away from his pain.  Objectives / interventions for next session: Work goal development. Approach as challenge. Develop skill to speak with self in a compassionate voice when going about his day    Padilla Keene MA, LP, [facility]  Pain Specialist  [facility] [place]

## 2017-05-04 NOTE — PROGRESS NOTES
"Pain Physical Therapy Progress Note    Patient Name: Jailene Breen     YOB: 1965     Medical Record Number: [id number]    Visits: 2/10    Diagnosis:    Lumbosacral spondylosis without myelopathy  DDD (degenerative disc disease), lumbar  Myofascial pain  Ankylosing spondylitis (H)     Hx of RA    Subjective: Patient reports that he has increased pain in his L knee.  Was running 8 miles about 2 weeks ago; and around the 5 mile leland felt increased pain in knee; thought that he could just \"run through it\"; but did have difficulty with walk back home at end of run.    Did have surgery on R knee last fall, so frustrated that having issues with \"good\" knee.    Was having L knee with walking for awhile, that has improved, but can not do stairs, get up from chair/squats without pain.  Took elevator today to second floor for appointment.  Has been walking dogs, has noticed increase pain when on inclines; has try slight jog for short distance with increased pain    Continuing to have back pain when not running; states that he has no pain when running and returns to same amount when stops    Is looking for ways to get back into running again    Self Care  HEP: still doing trunk rotation stretch, feels like can move a little bit more  Walking/Aerobic Activity: hasn't run in about 2 weeks, wants to get back to it  Heat/Ice: hasn't used  Posture: NA  Mini Breaks: NA  Breathing/Relaxation: NA  Pacing: NA    Objective Findings:    OBSERVATION: Slouched posture--slightly improved from last visit, forward head; rounded shoulders; clenching jaw; poor core engagement with seated posture. upper chest breathing noted; needs cues to stay on task  GAIT & LOCOMOTION: slight Antalgic; slight forward flexed, decreased trunk rotation; slight decrease in heel to toe gait on L/full knee extension on heel strike  RANGE OF MOTION: demo decreased knee extension on L without pain complaints  PALPATION: tenderness along lateral " "gastroc head/fibular head     Treatment Interventions:  Therapeutic Procedures/Exercises: Discussed exercise program; recommend not to run at this time--unable to do stairs/sit to/from stand without knee pain; those activities have less force on joints/body than running does.  Discussed other alternative--may try biking--may be better for back to have higher handbars, more upright sitting, be aware of how much knees bending; discussed doing small activity--not having increased pain afterwards; aware that there may be some muscle soreness with new activity; may benefit from pool/water activity--discussed stress on body due to water buoyancy. Reviewed stretches for GS/DFs.     Therapeutic Activity:  Discussed with patient importance of pacing of activity/exercise; need to allow joints/injuries to heal; make modifications with activity for best healing Discussed follow up with PCP/ortho MD; patient declined--\"I think it's just muscle\"; discussed use of heat/cold--discussed pros/cons of each.    Neuromuscular Reeducation:   Therapeutic Neuroscience Education: Discussed how stress, anxiety, and pain are intertwined with each other. Discussed how use of education, regular aerobic exercise--with moderation; and not increasing the pain, relaxation, sleep can help to decrease the sensitivity of nervous system, and help to decrease pain.    Reviewed need to find different outlet than running multiple times; Discussed how causing more pain overall will not help to decrease pain    __________________________________________________________________    Instruction on home program: look into biking, pool activity    Assessment:  Ongoing Functional Limitations Include:  Patient tolerated treatment  Impression: patient with limited insight at this time; very focused on running as way to decrease pain, even though has new injury to L knee and having difficulty with functional activities, like stairs, sit to/from " transfers    Recommendations: continue, TEAM; follow up with PCP/ortho for L knee pain    Intensity Level: 1 (1=low intensity; 5=high intensity)    Demonstrates/Verbalizes Technique: NA (1= poor technique-difficulty performing exercises,significant cues required; 5= good technique-performs exercises without cues)    Body Awareness: 2 (1=low awareness; 5=high awareness)    Posture/Stability: 1 (1= poor posture, stability; 5= good posture, stability)    Motivational Level: Distracted, anxious    Response to Teaching: needs reinforcement    Factors that affect learning: lacks insight    _______________________________________________________________________  Plan of Care   Continue PT to support reactivation and integration of self regulation pain management skills;  Focus of next session will be on: TNE, posture     Present:  NA     Total Visit Time:  45 minutes  TA: 15 mins; Neuro-Re Ed: 10 mins; TE: 20 mins      Therapist: Renetta White PT             Date: 5/4/2017

## 2017-05-08 ENCOUNTER — MYC MEDICAL ADVICE (OUTPATIENT)
Dept: FAMILY MEDICINE | Facility: CLINIC | Age: 52
End: 2017-05-08

## 2017-05-08 DIAGNOSIS — F17.200 TOBACCO USE DISORDER: ICD-10-CM

## 2017-05-12 NOTE — TELEPHONE ENCOUNTER
Prescription approved per [facility] Refill Protocol.  Suzy Muhammad RN

## 2017-05-15 DIAGNOSIS — M51.369 DDD (DEGENERATIVE DISC DISEASE), LUMBAR: ICD-10-CM

## 2017-05-15 DIAGNOSIS — M45.9 ANKYLOSING SPONDYLITIS (H): ICD-10-CM

## 2017-05-15 DIAGNOSIS — M53.3 SACROILIAC JOINT PAIN: ICD-10-CM

## 2017-05-15 DIAGNOSIS — M47.817 LUMBOSACRAL SPONDYLOSIS WITHOUT MYELOPATHY: ICD-10-CM

## 2017-05-15 DIAGNOSIS — M79.18 MYOFASCIAL PAIN: ICD-10-CM

## 2017-05-15 NOTE — TELEPHONE ENCOUNTER
Fax received from: [facility]  Drug: pregabalin (LYRICA) 25 MG capsule   Qty: 90  Last filled: 03/31/1917  Last seen: 3/31/17  Next appointment: 6/6/17         Nicolasa Krueger MA

## 2017-05-16 ENCOUNTER — TELEPHONE (OUTPATIENT)
Dept: PALLIATIVE MEDICINE | Facility: CLINIC | Age: 52
End: 2017-05-16

## 2017-05-16 NOTE — TELEPHONE ENCOUNTER
Please call patient and find out what dose he is currently at, if it is helpful and he wishes to hold there or if he is tolerating well and wants to increase the dose.     Thanks.  Susana GRNAT RN CNP, FNP  [place] [facility]

## 2017-05-16 NOTE — TELEPHONE ENCOUNTER
Pt is calling and stating that 3 weeks ago he went on an 8 mile run and felt pain in his left knee part way through it. The pain has not gone away and is now having a hard time with it. Pt is scheduled to be seen on 6/6 and wanted to be seen earlier in regards to this. No appointments available. Pt would like to know what to do with the pain. Please call.  Phone #[phone]    Elana Maldonado    [facility]

## 2017-05-17 ENCOUNTER — OFFICE VISIT (OUTPATIENT)
Dept: URGENT CARE | Facility: URGENT CARE | Age: 52
End: 2017-05-17
Payer: COMMERCIAL

## 2017-05-17 ENCOUNTER — RADIANT APPOINTMENT (OUTPATIENT)
Dept: GENERAL RADIOLOGY | Facility: CLINIC | Age: 52
End: 2017-05-17
Attending: NURSE PRACTITIONER
Payer: COMMERCIAL

## 2017-05-17 VITALS
BODY MASS INDEX: 27.7 KG/M2 | SYSTOLIC BLOOD PRESSURE: 148 MMHG | OXYGEN SATURATION: 99 % | HEART RATE: 71 BPM | TEMPERATURE: 99 F | WEIGHT: 227.6 LBS | DIASTOLIC BLOOD PRESSURE: 94 MMHG

## 2017-05-17 DIAGNOSIS — M25.562 ACUTE PAIN OF LEFT KNEE: Primary | ICD-10-CM

## 2017-05-17 PROCEDURE — 99213 OFFICE O/P EST LOW 20 MIN: CPT | Performed by: NURSE PRACTITIONER

## 2017-05-17 PROCEDURE — 73560 X-RAY EXAM OF KNEE 1 OR 2: CPT | Mod: LT

## 2017-05-17 RX ORDER — PREGABALIN 50 MG/1
50 CAPSULE ORAL EVERY EVENING
Qty: 30 CAPSULE | Refills: 0 | Status: SHIPPED | OUTPATIENT
Start: 2017-05-17 | End: 2017-07-28

## 2017-05-17 RX ORDER — PREGABALIN 25 MG/1
CAPSULE ORAL
Qty: 60 CAPSULE | Refills: 0 | Status: SHIPPED | OUTPATIENT
Start: 2017-05-17 | End: 2017-07-28

## 2017-05-17 ASSESSMENT — PATIENT HEALTH QUESTIONNAIRE - PHQ9: SUM OF ALL RESPONSES TO PHQ QUESTIONS 1-9: 8

## 2017-05-17 NOTE — TELEPHONE ENCOUNTER
LVM. Script for Lyrica 25 mg and 50 mg was signed and faxed to [facility]. RightFax confirmed.      Nicolasa Krueger MA

## 2017-05-17 NOTE — TELEPHONE ENCOUNTER
I spoke to Les and he is currently using 25 MG tid. He is not having any side effects however he is not getting any pain relief either and would like to increase his dose.    KERLINE RivasN, RN  Care Coordinator  [facility] [place]

## 2017-05-17 NOTE — PROGRESS NOTES
"  SUBJECTIVE:                                                    Jailene Breen is a 51 year old male who presents to clinic today for the following health issues:      Musculoskeletal problem/pain      Duration: A week ago    Description  Location: left knee pain    Intensity:  10/10    Accompanying signs and symptoms: radiation of pain to ankle    History  Previous similar problem: no   Previous evaluation:  none    Precipitating or alleviating factors:  Trauma or overuse: Yes  Aggravating factors include: standing, walking and climbing stairs    Therapies tried and outcome: support wrap and Oxycodone, Ibuprofen          Allergies   Allergen Reactions     Hydrocodone Itching     Remeron Soltab Other (See Comments)     \"Blacked out\" for one week       Past Medical History:   Diagnosis Date     Ankylosing spondylitis (H) 3/1/2017     Anxiety      Arthritis     back, knees     Back pain     possible drug seeking behavior in the past.      Panic attacks          Current Outpatient Prescriptions on File Prior to Visit:  pregabalin (LYRICA) 25 MG capsule Take 25mg in the morning and afternoon   pregabalin (LYRICA) 50 MG capsule Take 1 capsule (50 mg) by mouth every evening   nicotine polacrilex (EQ NICOTINE POLACRILEX) 2 MG gum Place 1 each (2 mg) inside cheek as needed for smoking cessation   oxyCODONE (ROXICODONE) 5 MG IR tablet May take one tablet every 6 hours max of 3 tabs per day and will allow #15 tablets per month. Dispense on/after 5/4/17.  Begin on 5/6/2017 and must last 30 days.   nortriptyline (PAMELOR) 10 MG capsule 3 tabs at bedtime. If chronic pain continues, may continue increasing dose 1 additional tab at bedtime every 3 nights until gets to 50 mg and we can switch to 25 mg tabs.   orphenadrine (NORFLEX) 100 MG 12 hr tablet Take 1 tablet (100 mg) by mouth 2 times daily as needed for muscle spasms or moderate to severe pain   oxyCODONE-acetaminophen (PERCOCET) 5-325 MG per tablet 1 tab BID for 7 days " then 1 tab qd (Patient not taking: Reported on 3/31/2017)   methocarbamol (ROBAXIN) 750 MG tablet Take 1 tablet (750 mg) by mouth 3 times daily as needed for muscle spasms (Patient not taking: Reported on 3/31/2017)   oxyCODONE (ROXICODONE) 10 MG IR tablet Take 1 tablet (10 mg) by mouth every 8 hours as needed for moderate to severe pain (Patient not taking: Reported on 3/1/2017)   nicotine polacrilex (NICORETTE) 2 MG gum Reported on 3/31/2017   busPIRone (BUSPAR) 15 MG tablet O.5 tabs PO BID for 3 days then 0.5 tab qam and 1 tab qhs for 3 days then 1 tab bid for 3 days.  If still sympotmatic,  may increase to 1 tab QAM and 1.5 tabs at bedtime for 3 days, then 1.5 tabs BID for 3 days, then 1.5 tabs q am and 2 tabs QHS for 3 days, then 2 tabs BID. (Patient not taking: Reported on 4/26/2017)   clonazePAM (KLONOPIN) 1 MG tablet Take 0.5-1 tablets (0.5-1 mg) by mouth 2 times daily as needed for anxiety (Patient not taking: Reported on 2/27/2017)   cyclobenzaprine (FLEXERIL) 5 MG tablet Take 5 mg by mouth Reported on 4/26/2017   clonazePAM (KLONOPIN) 1 MG tablet Take 0.5-1 tablets (0.5-1 mg) by mouth 2 times daily as needed for anxiety (Patient not taking: Reported on 3/1/2017)   oxyCODONE (ROXICODONE) 10 MG IR tablet Take 1 tablet (10 mg) by mouth every 8 hours as needed for moderate to severe pain (Patient not taking: Reported on 4/26/2017)   tamsulosin (FLOMAX) 0.4 MG capsule Take 1 capsule (0.4 mg) by mouth daily (Patient not taking: Reported on 2/26/2017)   DULoxetine (CYMBALTA) 30 MG EC capsule 1 tab daily for 1 week then 1 tab BID (Patient not taking: Reported on 3/1/2017)   oxyCODONE (OXY-IR) 5 MG capsule Take 5 mg by mouth every 6 hours as needed for moderate to severe pain Reported on 4/3/2017   FLUoxetine (PROZAC) 20 MG capsule Take 3 capsules (60 mg) by mouth daily   hydrOXYzine (ATARAX) 25 MG tablet Take 1-2 tablets (25-50 mg) by mouth every 6 hours as needed for anxiety (Patient not taking: Reported on  3/31/2017)   methylPREDNISolone (MEDROL DOSEPAK) 4 MG tablet Follow package instructions (Patient not taking: Reported on 2/26/2017)   methotrexate 2.5 MG tablet 6 tablets Reported on 4/3/2017   adalimumab (HUMIRA) 40 MG/0.8ML prefilled syringe kit Inject 40 mg Subcutaneous Reported on 3/31/2017   folic acid (FOLVITE) 1 MG tablet Reported on 4/26/2017   IBUPROFEN PO Take 800 mg by mouth every 6 hours as needed for moderate pain Reported on 3/31/2017     No current facility-administered medications on file prior to visit.     Social History   Substance Use Topics     Smoking status: Never Smoker     Smokeless tobacco: Current User     Types: Chew      Comment: Chew     Alcohol use No      Comment: none       ROS:  GEN no fevers  SKIN as above  Musculoskel: + as above    OBJECTIVE:  BP (!) 148/94  Pulse 71  Temp 99  F (37.2  C) (Oral)  Wt 227 lb 9.6 oz (103.2 kg)  SpO2 99%  BMI 27.7 kg/m2   General:   awake, alert, and cooperative.  NAD.   Head: Normocephalic, atraumatic.  Eyes: Conjunctiva clear,   MS:  Tenderness on both lateral sides of left knee, no swelling, able to bear weight. Pulses and sensation is intact. Active and passive ROM is intact.  Neuro: Alert and oriented - normal speech.  Results for orders placed or performed in visit on 05/17/17 (from the past 24 hour(s))   XR Knee Left 1/2 Views    Narrative    XR KNEE LT 1/2 VW 5/17/2017 12:13 PM     HISTORY: Pain in left knee    COMPARISON: None      Impression    IMPRESSION: No evidence of fracture. Mild degenerative changes in the  patellofemoral compartment.    JOCELIN SCOTT MD         ASSESSMENT:    ICD-10-CM    1. Acute pain of left knee M25.562 XR Knee Left 1/2 Views     ORTHOPEDICS ADULT REFERRAL           PLAN:   I discussed xray results with the patient. Advised NSAIDs or tylenol for pain, ice, Rest from running exercises, can participate in other workout activies like swimming, walking which are not stressful to the knees.   Patient requests  to be seen by bone doctor. I made a referral to orthopedic.  Nay Simmons  [facility]  Family Nurse Practitoner

## 2017-05-17 NOTE — MR AVS SNAPSHOT
After Visit Summary   5/17/2017    Jailene Breen    MRN: [id number]           Patient Information     Date Of Birth          1965        Visit Information        Provider Department      5/17/2017 11:25 AM Nay Simmons NP [facility]        Today's Diagnoses     Acute pain of left knee    -  1       Follow-ups after your visit        Additional Services     ORTHOPEDICS ADULT REFERRAL       Your provider has referred you to: G: [facility] [phone]   [email or web address]    Please be aware that coverage of these services is subject to the terms and limitations of your health insurance plan.  Call member services at your health plan with any benefit or coverage questions.      Please bring the following to your appointment:    >>   Any x-rays, CTs or MRIs which have been performed.  Contact the facility where they were done to arrange for  prior to your scheduled appointment.    >>   List of current medications   >>   This referral request   >>   Any documents/labs given to you for this referral                  Your next 10 appointments already scheduled     May 18, 2017  8:00 AM CDT   Return Visit with Padilla Keene LP   [facility] ([facility] [place])    [address] [address]   [phone]            May 18, 2017  9:30 AM CDT   Return Visit with Renetta White PT   [facility] ([facility] [place])    [address] [address]   [phone]            May 31, 2017  9:15 AM CDT   Radiology Injections with Silvia Gamble MD   [facility] ([facility] [place])    [address] [address]   [phone]            Jun 06, 2017  3:00 PM CDT   Return Visit with JUANITA Ga CNP   [facility] ([facility] [place])    [address]  [place] [address] [phone]              Who to contact     If you have questions or need follow up information about today's clinic visit or your schedule please contact [facility] directly at [phone].  Normal or non-critical lab and imaging results will be communicated to you by MyChart, letter or phone within 4 business days after the clinic has received the results. If you do not hear from us within 7 days, please contact the clinic through [employer] or phone. If you have a critical or abnormal lab result, we will notify you by phone as soon as possible.  Submit refill requests through [employer] or call your pharmacy and they will forward the refill request to us. Please allow 3 business days for your refill to be completed.          Additional Information About Your Visit        MyChart Information     [employer] gives you secure access to your electronic health record. If you see a primary care provider, you can also send messages to your care team and make appointments. If you have questions, please call your primary care clinic.  If you do not have a primary care provider, please call [phone] and they will assist you.        Care EveryWhere ID     This is your Care EveryWhere ID. This could be used by other organizations to access your [facility] medical records  [id number]        Your Vitals Were     Pulse Temperature Pulse Oximetry BMI (Body Mass Index)          71 99  F (37.2  C) (Oral) 99% 27.7 kg/m2         Blood Pressure from Last 3 Encounters:   05/17/17 (!) 148/94   04/26/17 141/80   04/03/17 141/86    Weight from Last 3 Encounters:   05/17/17 227 lb 9.6 oz (103.2 kg)   03/31/17 218 lb (98.9 kg)   03/01/17 205 lb (93 kg)              We Performed the Following     ORTHOPEDICS ADULT REFERRAL     XR Knee Left 1/2 Views        Primary Care Provider Office Phone # Fax #    JASON Rice [phone]         [facility]ARE [place] [address] [facility] [address]        Thank you!     Thank you for choosing [facility]  for your care. Our goal is always to provide you with excellent care. Hearing back from our patients is one way we can continue to improve our services. Please take a few minutes to complete the written survey that you may receive in the mail after your visit with us. Thank you!             Your Updated Medication List - Protect others around you: Learn how to safely use, store and throw away your medicines at www.disposemymeds.org.          This list is accurate as of: 5/17/17 12:21 PM.  Always use your most recent med list.                   Brand Name Dispense Instructions for use    busPIRone 15 MG tablet    BUSPAR    90 tablet    O.5 tabs PO BID for 3 days then 0.5 tab qam and 1 tab qhs for 3 days then 1 tab bid for 3 days.  If still sympotmatic,  may increase to 1 tab QAM and 1.5 tabs at bedtime for 3 days, then 1.5 tabs BID for 3 days, then 1.5 tabs q am and 2 tabs QHS for 3 days, then 2 tabs BID.       * clonazePAM 1 MG tablet    klonoPIN    4 tablet    Take 0.5-1 tablets (0.5-1 mg) by mouth 2 times daily as needed for anxiety       * clonazePAM 1 MG tablet    klonoPIN    4 tablet    Take 0.5-1 tablets (0.5-1 mg) by mouth 2 times daily as needed for anxiety       cyclobenzaprine 5 MG tablet    FLEXERIL     Take 5 mg by mouth Reported on 4/26/2017       DULoxetine 30 MG EC capsule    CYMBALTA    60 capsule    1 tab daily for 1 week then 1 tab BID       FLUoxetine 20 MG capsule    PROzac    90 capsule    Take 3 capsules (60 mg) by mouth daily       folic acid 1 MG tablet    FOLVITE     Reported on 4/26/2017       HUMIRA 40 MG/0.8ML prefilled syringe kit   Generic drug:  adalimumab      Inject 40 mg Subcutaneous Reported on 3/31/2017       hydrOXYzine 25 MG tablet    ATARAX    90 tablet    Take 1-2 tablets (25-50 mg) by mouth every 6 hours as needed for anxiety        IBUPROFEN PO      Take 800 mg by mouth every 6 hours as needed for moderate pain Reported on 3/31/2017       methocarbamol 750 MG tablet    ROBAXIN    60 tablet    Take 1 tablet (750 mg) by mouth 3 times daily as needed for muscle spasms       methotrexate 2.5 MG tablet CHEMO      6 tablets Reported on 4/3/2017       methylPREDNISolone 4 MG tablet    MEDROL DOSEPAK    21 tablet    Follow package instructions       * nicotine polacrilex 2 MG gum    NICORETTE     Reported on 3/31/2017       * nicotine polacrilex 2 MG gum    EQ NICOTINE POLACRILEX    40 tablet    Place 1 each (2 mg) inside cheek as needed for smoking cessation       nortriptyline 10 MG capsule    PAMELOR    180 capsule    3 tabs at bedtime. If chronic pain continues, may continue increasing dose 1 additional tab at bedtime every 3 nights until gets to 50 mg and we can switch to 25 mg tabs.       orphenadrine 100 MG 12 hr tablet    NORFLEX    60 tablet    Take 1 tablet (100 mg) by mouth 2 times daily as needed for muscle spasms or moderate to severe pain       * oxyCODONE 5 MG capsule    OXY-IR     Take 5 mg by mouth every 6 hours as needed for moderate to severe pain Reported on 4/3/2017       * oxyCODONE 10 MG IR tablet    ROXICODONE    21 tablet    Take 1 tablet (10 mg) by mouth every 8 hours as needed for moderate to severe pain       * oxyCODONE 10 MG IR tablet    ROXICODONE    6 tablet    Take 1 tablet (10 mg) by mouth every 8 hours as needed for moderate to severe pain       * oxyCODONE 5 MG IR tablet    ROXICODONE    15 tablet    May take one tablet every 6 hours max of 3 tabs per day and will allow #15 tablets per month. Dispense on/after 5/4/17.  Begin on 5/6/2017 and must last 30 days.       oxyCODONE-acetaminophen 5-325 MG per tablet    PERCOCET    21 tablet    1 tab BID for 7 days then 1 tab qd       * pregabalin 25 MG capsule    LYRICA    60 capsule    Take 25mg in the morning and afternoon       * pregabalin 50 MG capsule    LYRICA    30  capsule    Take 1 capsule (50 mg) by mouth every evening       tamsulosin 0.4 MG capsule    FLOMAX    30 capsule    Take 1 capsule (0.4 mg) by mouth daily       * Notice:  This list has 10 medication(s) that are the same as other medications prescribed for you. Read the directions carefully, and ask your doctor or other care provider to review them with you.

## 2017-05-17 NOTE — TELEPHONE ENCOUNTER
The patient was notified of the change in dosage and had no questions.    KERLINE RivasN, RN  Care Coordinator  [facility] [place]

## 2017-05-17 NOTE — NURSING NOTE
"Chief Complaint   Patient presents with     Musculoskeletal Problem     started a week ago after runny miles.        Initial BP (!) 148/94  Pulse 71  Temp 99  F (37.2  C) (Oral)  Wt 227 lb 9.6 oz (103.2 kg)  SpO2 99%  BMI 27.7 kg/m2 Estimated body mass index is 27.7 kg/(m^2) as calculated from the following:    Height as of 3/31/17: 6' 4\" (1.93 m).    Weight as of this encounter: 227 lb 9.6 oz (103.2 kg).  Medication Reconciliation: complete   June Gonzales MA        "

## 2017-05-17 NOTE — TELEPHONE ENCOUNTER
Signed Prescriptions:                        Disp   Refills    pregabalin (LYRICA) 25 MG capsule          60 cap*0        Sig: Take 25mg in the morning and afternoon  Authorizing Provider: SUSANA PETTY    pregabalin (LYRICA) 50 MG capsule          30 cap*0        Sig: Take 1 capsule (50 mg) by mouth every evening  Authorizing Provider: SUSANA PETTY    Please call patient and let him know I am increasing the evening dose to a 50 mg capsule.    Signed script placed in touchdown bin at [facility].    Susana Petty APRN CNP FNP RN  [facility]

## 2017-05-18 ENCOUNTER — OFFICE VISIT (OUTPATIENT)
Dept: PALLIATIVE MEDICINE | Facility: CLINIC | Age: 52
End: 2017-05-18
Payer: COMMERCIAL

## 2017-05-18 DIAGNOSIS — M51.369 DDD (DEGENERATIVE DISC DISEASE), LUMBAR: ICD-10-CM

## 2017-05-18 DIAGNOSIS — M45.9 ANKYLOSING SPONDYLITIS (H): ICD-10-CM

## 2017-05-18 DIAGNOSIS — M79.18 MYOFASCIAL PAIN: ICD-10-CM

## 2017-05-18 DIAGNOSIS — G89.4 CHRONIC PAIN SYNDROME: Primary | ICD-10-CM

## 2017-05-18 DIAGNOSIS — M47.817 LUMBOSACRAL SPONDYLOSIS WITHOUT MYELOPATHY: Primary | ICD-10-CM

## 2017-05-18 PROCEDURE — 97110 THERAPEUTIC EXERCISES: CPT | Mod: GP | Performed by: PHYSICAL THERAPIST

## 2017-05-18 PROCEDURE — 96152 HC HEALTH AND BEHAVIOR INTERVENTION, INDIVIDUAL, EACH 15 MINUTES: CPT | Performed by: PSYCHOLOGIST

## 2017-05-18 PROCEDURE — 97112 NEUROMUSCULAR REEDUCATION: CPT | Mod: GP | Performed by: PHYSICAL THERAPIST

## 2017-05-18 NOTE — PROGRESS NOTES
Pain Physical Therapy Progress Note    Patient Name: Jailene Breen     YOB: 1965     Medical Record Number: [id number]    Visits: 3/10    Diagnosis:    Lumbosacral spondylosis without myelopathy  DDD (degenerative disc disease), lumbar  Myofascial pain  Ankylosing spondylitis (H)     Hx of RA    Subjective: Patient reports that back and knee have been horrible; things have been flared up.    Did try a bike last week; hurt with about a mile    Pain with sit to stand transfers, pain with stairs    States that his dog has been having leg issues too, so has been carrying up stairs for a few days--dog about 90#    Was able to push-mow lawn yesterday, no increase in pain for back, knee sore; was self-propelled    Still having pain the R knee at times    States that he has already used up his pain meds that was to last him 30 days;     Frustrated that he doesn't know what is wrong with L knee yet, has appointment with Sports med tomorrow, wants MRI; wants to get back to being active, running, etc    Self Care  HEP: doing trunk rotation stretch  Walking/Aerobic Activity: tried biking short distance, didn't help; has done a little walking  Heat/Ice: icing knee  Posture: NA  Mini Breaks: NA  Breathing/Relaxation: has learned a few things from Padilla Keene LP  Pacing: NA    Objective Findings:    OBSERVATION: Slouched posture--slightly improved from last visit, forward head; rounded shoulders; clenching jaw; chewing nicotine gum;  poor core engagement with seated posture. upper chest breathing noted; needs cues to stay on task  GAIT & LOCOMOTION: slight Antalgic; slight forward flexed, decreased trunk rotation; slight decrease in heel to toe gait on L/full knee extension on heel strike  RANGE OF MOTION: demo full ROM on L without pain complaints  MUSCLE PERFORMANCE: cues for small movement wit scapular retraction    Treatment Interventions:  Therapeutic Procedures/Exercises: discussed working on small  walks--if not increasing pain for back or knee; can do multiple walks throughout the day vs one long one; reviewed possibility of pool--patient states having issues with fiances at this time--doesn't have access    Neuromuscular Reeducation:   Posture:  went over basic posture cues in sitting and standing, and walking, used mirror to assist with visual feedback and body awareness.  Discussed with patient that it does take time to learn; it is important to start working on it and gradually will become new habit; patient may have some muscles that are more sore as they are starting to be used in different ways, and that is OK; gave handout.  Discussed looking at sitting in chairs/recliners at home; discussed chewing gum--patient thinking about using patches instead of nicotine gum--has been having more T MJ issues, discussed use of night splint--has at home, doesn't use regularly  __________________________________________________________________    Instruction on home program: posture    Assessment:  Ongoing Functional Limitations Include:  Patient tolerated/responded well to treatment  Impression: patient appears to have better insight today; has limited activity; is very focused on L knee and is hoping to get answers tomorrow and wants to have RFA done as soon as possible    Recommendations: continue    Intensity Level: 1 (1=low intensity; 5=high intensity)    Demonstrates/Verbalizes Technique: 2 (1= poor technique-difficulty performing exercises,significant cues required; 5= good technique-performs exercises without cues)    Body Awareness: 1 (1=low awareness; 5=high awareness)    Posture/Stability: 1 (1= poor posture, stability; 5= good posture, stability)    Motivational Level: Distracted    Response to Teaching: enthusiastic    Factors that affect learning: None    _______________________________________________________________________  Plan of Care   Continue PT to support reactivation and integration of self  regulation pain management skills;  Focus of next session will be on: TNE, self care     Present:  NA     Total Visit Time:  40 minutes  Neuro-Re Ed: 30 mins; TE: 10 mins      Therapist: Renetta White PT             Date: 5/18/2017

## 2017-05-18 NOTE — PROGRESS NOTES
[facility]   [facility] [place] [facility]  Behavioral Medicine Visit    Patient Name: Jailene Breen    YOB: 1965   Medical Record Number: [id number]  Date: 5/18/2017                SUBJECTIVE:  Session objective: In for appointment. Begins to review list concerns, most of which are out of his immediate control. Responded to initial redirection for fairly quickly became tangential and problem focused. Stressors are large but coping skills focus on where he has no control and seem to magnify his focus    He complains of left knee pain for the last two weeks. He complains the pain is severe enough that he used his monthly narcotic supply in 5 days. He did make appointment to see orthopedic provider (Dr Valladares, [facility]) He does have an appointment for an RFA in two weeks, he does have an appointment to talk about job possibilities next week with a job counselor.     He does commit to doing a job at home that he can complete with the expectation he focus on appreciating his work and accomplishment rather then reflecting on imperfection or returning to focus on matters outside of his immediate control      OBJECTIVE:   Length of Visit: 45 minutes      Assessment: Current Emotional / Mental Status    Appearance:   Appropriate   Eye Contact:   Fair   Psychomotor Behavior: Normal   Attitude:   Cooperative  passive, complaining   Orientation:   All  Speech   Rate / Production: Normal    Volume:  Normal   Mood:    Anxious   Affect:    Flat  Worrisome   Thought Content:  Referential Thinking   Thought Form:  Coherent  Logical   Insight:    Poor     ASSESSMENT:   Axis I: Chronic Knee Pain, Chronic Back Pain  Axis II: Dx deferred    Progress toward goals: limited somatic pain focus.      Pain Status: worsened    Emotional Status: worsened   Medication / chemical use concerns: None    PLAN:   Next Appointment: Jailene JOAQUÍN  Jamshid will schedule a follow-up appointment in 3 weeks.  Assignment: revisit plan established in last session. Work on what can be worked on in the present. Work on outside problems but one next step is established let it go for until the next step is actually occurring.   Objectives / interventions for next session: follow up on assignment to be in present day, to complete something in the present day    Padilla Keene MA, LP, [facility]  Pain Specialist  [facility] [place]

## 2017-05-18 NOTE — MR AVS SNAPSHOT
After Visit Summary   5/18/2017    Jailene Breen    MRN: [id number]           Patient Information     Date Of Birth          1965        Visit Information        Provider Department      5/18/2017 8:00 AM Padilla Keene LP [facility]        Today's Diagnoses     Chronic pain syndrome    -  1    DDD (degenerative disc disease), lumbar           Follow-ups after your visit        Your next 10 appointments already scheduled     May 19, 2017 10:40 AM CDT   New Visit with Josias Danielson DO   [facility] [place] ([facility] [place])    [address]  [place] [address]   [phone]            May 31, 2017  9:15 AM CDT   Radiology Injections with Silvia Gamble MD   [facility] ([facility])    [address]  [place] [address]   [phone]            Jun 06, 2017  3:00 PM CDT   Return Visit with JUANITA Ga CNP   [facility] ([facility])    [address] [address]   [phone]              Who to contact     If you have questions or need follow up information about today's clinic visit or your schedule please contact [facility] directly at [phone].  Normal or non-critical lab and imaging results will be communicated to you by MyChart, letter or phone within 4 business days after the clinic has received the results. If you do not hear from us within 7 days, please contact the clinic through [employer] or phone. If you have a critical or abnormal lab result, we will notify you by phone as soon as possible.  Submit refill requests through [employer] or call your pharmacy and they will forward the refill request to us. Please allow 3 business days for your refill to be completed.          Additional Information About Your Visit        MyChart Information     [employer] gives you  secure access to your electronic health record. If you see a primary care provider, you can also send messages to your care team and make appointments. If you have questions, please call your primary care clinic.  If you do not have a primary care provider, please call [phone] and they will assist you.        Care EveryWhere ID     This is your Care EveryWhere ID. This could be used by other organizations to access your [facility] medical records  [id number]         Blood Pressure from Last 3 Encounters:   05/17/17 (!) 148/94   04/26/17 141/80   04/03/17 141/86    Weight from Last 3 Encounters:   05/17/17 103.2 kg (227 lb 9.6 oz)   03/31/17 98.9 kg (218 lb)   03/01/17 93 kg (205 lb)              We Performed the Following     HEALTH & BEHAVIOR ASSESS, INITIAL, EA 15MIN PHD        Primary Care Provider Office Phone # Fax #    JASON Rice [phone]        URGENT CARE [place] [address] [facility] [address]        Thank you!     Thank you for choosing [facility]  for your care. Our goal is always to provide you with excellent care. Hearing back from our patients is one way we can continue to improve our services. Please take a few minutes to complete the written survey that you may receive in the mail after your visit with us. Thank you!             Your Updated Medication List - Protect others around you: Learn how to safely use, store and throw away your medicines at www.disposemymeds.org.          This list is accurate as of: 5/18/17 12:08 PM.  Always use your most recent med list.                   Brand Name Dispense Instructions for use    busPIRone 15 MG tablet    BUSPAR    90 tablet    O.5 tabs PO BID for 3 days then 0.5 tab qam and 1 tab qhs for 3 days then 1 tab bid for 3 days.  If still sympotmatic,  may increase to 1 tab QAM and 1.5 tabs at bedtime for 3 days, then 1.5 tabs BID for 3 days, then 1.5 tabs q am and 2 tabs QHS for 3 days, then 2 tabs  BID.       * clonazePAM 1 MG tablet    klonoPIN    4 tablet    Take 0.5-1 tablets (0.5-1 mg) by mouth 2 times daily as needed for anxiety       * clonazePAM 1 MG tablet    klonoPIN    4 tablet    Take 0.5-1 tablets (0.5-1 mg) by mouth 2 times daily as needed for anxiety       cyclobenzaprine 5 MG tablet    FLEXERIL     Take 5 mg by mouth Reported on 4/26/2017       DULoxetine 30 MG EC capsule    CYMBALTA    60 capsule    1 tab daily for 1 week then 1 tab BID       FLUoxetine 20 MG capsule    PROzac    90 capsule    Take 3 capsules (60 mg) by mouth daily       folic acid 1 MG tablet    FOLVITE     Reported on 4/26/2017       HUMIRA 40 MG/0.8ML prefilled syringe kit   Generic drug:  adalimumab      Inject 40 mg Subcutaneous Reported on 3/31/2017       hydrOXYzine 25 MG tablet    ATARAX    90 tablet    Take 1-2 tablets (25-50 mg) by mouth every 6 hours as needed for anxiety       IBUPROFEN PO      Take 800 mg by mouth every 6 hours as needed for moderate pain Reported on 3/31/2017       methocarbamol 750 MG tablet    ROBAXIN    60 tablet    Take 1 tablet (750 mg) by mouth 3 times daily as needed for muscle spasms       methotrexate 2.5 MG tablet CHEMO      6 tablets Reported on 4/3/2017       methylPREDNISolone 4 MG tablet    MEDROL DOSEPAK    21 tablet    Follow package instructions       * nicotine polacrilex 2 MG gum    NICORETTE     Reported on 3/31/2017       * nicotine polacrilex 2 MG gum    EQ NICOTINE POLACRILEX    40 tablet    Place 1 each (2 mg) inside cheek as needed for smoking cessation       nortriptyline 10 MG capsule    PAMELOR    180 capsule    3 tabs at bedtime. If chronic pain continues, may continue increasing dose 1 additional tab at bedtime every 3 nights until gets to 50 mg and we can switch to 25 mg tabs.       orphenadrine 100 MG 12 hr tablet    NORFLEX    60 tablet    Take 1 tablet (100 mg) by mouth 2 times daily as needed for muscle spasms or moderate to severe pain       * oxyCODONE 5 MG  capsule    OXY-IR     Take 5 mg by mouth every 6 hours as needed for moderate to severe pain Reported on 4/3/2017       * oxyCODONE 10 MG IR tablet    ROXICODONE    21 tablet    Take 1 tablet (10 mg) by mouth every 8 hours as needed for moderate to severe pain       * oxyCODONE 10 MG IR tablet    ROXICODONE    6 tablet    Take 1 tablet (10 mg) by mouth every 8 hours as needed for moderate to severe pain       * oxyCODONE 5 MG IR tablet    ROXICODONE    15 tablet    May take one tablet every 6 hours max of 3 tabs per day and will allow #15 tablets per month. Dispense on/after 5/4/17.  Begin on 5/6/2017 and must last 30 days.       oxyCODONE-acetaminophen 5-325 MG per tablet    PERCOCET    21 tablet    1 tab BID for 7 days then 1 tab qd       * pregabalin 25 MG capsule    LYRICA    60 capsule    Take 25mg in the morning and afternoon       * pregabalin 50 MG capsule    LYRICA    30 capsule    Take 1 capsule (50 mg) by mouth every evening       tamsulosin 0.4 MG capsule    FLOMAX    30 capsule    Take 1 capsule (0.4 mg) by mouth daily       * Notice:  This list has 10 medication(s) that are the same as other medications prescribed for you. Read the directions carefully, and ask your doctor or other care provider to review them with you.

## 2017-05-18 NOTE — MR AVS SNAPSHOT
After Visit Summary   5/18/2017    Jailene Breen    MRN: [id number]           Patient Information     Date Of Birth          1965        Visit Information        Provider Department      5/18/2017 9:30 AM Renetta White, PT [facility]        Today's Diagnoses     Lumbosacral spondylosis without myelopathy    -  1    DDD (degenerative disc disease), lumbar        Myofascial pain        Ankylosing spondylitis (H)           Follow-ups after your visit        Your next 10 appointments already scheduled     May 19, 2017 10:40 AM CDT   New Visit with Josias Danielson DO   [facility] [place] ([facility] [place])    [address]  [place] [address]   [phone]            May 31, 2017  9:15 AM CDT   Radiology Injections with Silvia Gamble MD   [facility] ([facility] [place])    [address]  [place] [address]   [phone]            Jun 06, 2017  3:00 PM CDT   Return Visit with JUANITA Ga CNP   [facility] ([facility] [place])    [address]  [place] [address]   [phone]              Who to contact     If you have questions or need follow up information about today's clinic visit or your schedule please contact [facility] directly at [phone].  Normal or non-critical lab and imaging results will be communicated to you by MyChart, letter or phone within 4 business days after the clinic has received the results. If you do not hear from us within 7 days, please contact the clinic through MyChart or phone. If you have a critical or abnormal lab result, we will notify you by phone as soon as possible.  Submit refill requests through [employer] or call your pharmacy and they will forward the refill request to us. Please allow 3 business days for your refill to be completed.          Additional  Information About Your Visit        MetroLinkedhart Information     Adility gives you secure access to your electronic health record. If you see a primary care provider, you can also send messages to your care team and make appointments. If you have questions, please call your primary care clinic.  If you do not have a primary care provider, please call [phone] and they will assist you.        Care EveryWhere ID     This is your Care EveryWhere ID. This could be used by other organizations to access your [facility] medical records  [id number]         Blood Pressure from Last 3 Encounters:   05/17/17 (!) 148/94   04/26/17 141/80   04/03/17 141/86    Weight from Last 3 Encounters:   05/17/17 103.2 kg (227 lb 9.6 oz)   03/31/17 98.9 kg (218 lb)   03/01/17 93 kg (205 lb)              We Performed the Following     NEUROMUSCULAR RE-EDUCATION     THERAPEUTIC EXERCISES        Primary Care Provider Office Phone # Fax #    JASON Rice [phone]        [facility] [address] [address]        Thank you!     Thank you for choosing [facility]  for your care. Our goal is always to provide you with excellent care. Hearing back from our patients is one way we can continue to improve our services. Please take a few minutes to complete the written survey that you may receive in the mail after your visit with us. Thank you!             Your Updated Medication List - Protect others around you: Learn how to safely use, store and throw away your medicines at www.disposemymeds.org.          This list is accurate as of: 5/18/17 10:08 AM.  Always use your most recent med list.                   Brand Name Dispense Instructions for use    busPIRone 15 MG tablet    BUSPAR    90 tablet    O.5 tabs PO BID for 3 days then 0.5 tab qam and 1 tab qhs for 3 days then 1 tab bid for 3 days.  If still sympotmatic,  may increase to 1 tab QAM and 1.5 tabs at bedtime for 3 days, then  1.5 tabs BID for 3 days, then 1.5 tabs q am and 2 tabs QHS for 3 days, then 2 tabs BID.       * clonazePAM 1 MG tablet    klonoPIN    4 tablet    Take 0.5-1 tablets (0.5-1 mg) by mouth 2 times daily as needed for anxiety       * clonazePAM 1 MG tablet    klonoPIN    4 tablet    Take 0.5-1 tablets (0.5-1 mg) by mouth 2 times daily as needed for anxiety       cyclobenzaprine 5 MG tablet    FLEXERIL     Take 5 mg by mouth Reported on 4/26/2017       DULoxetine 30 MG EC capsule    CYMBALTA    60 capsule    1 tab daily for 1 week then 1 tab BID       FLUoxetine 20 MG capsule    PROzac    90 capsule    Take 3 capsules (60 mg) by mouth daily       folic acid 1 MG tablet    FOLVITE     Reported on 4/26/2017       HUMIRA 40 MG/0.8ML prefilled syringe kit   Generic drug:  adalimumab      Inject 40 mg Subcutaneous Reported on 3/31/2017       hydrOXYzine 25 MG tablet    ATARAX    90 tablet    Take 1-2 tablets (25-50 mg) by mouth every 6 hours as needed for anxiety       IBUPROFEN PO      Take 800 mg by mouth every 6 hours as needed for moderate pain Reported on 3/31/2017       methocarbamol 750 MG tablet    ROBAXIN    60 tablet    Take 1 tablet (750 mg) by mouth 3 times daily as needed for muscle spasms       methotrexate 2.5 MG tablet CHEMO      6 tablets Reported on 4/3/2017       methylPREDNISolone 4 MG tablet    MEDROL DOSEPAK    21 tablet    Follow package instructions       * nicotine polacrilex 2 MG gum    NICORETTE     Reported on 3/31/2017       * nicotine polacrilex 2 MG gum    EQ NICOTINE POLACRILEX    40 tablet    Place 1 each (2 mg) inside cheek as needed for smoking cessation       nortriptyline 10 MG capsule    PAMELOR    180 capsule    3 tabs at bedtime. If chronic pain continues, may continue increasing dose 1 additional tab at bedtime every 3 nights until gets to 50 mg and we can switch to 25 mg tabs.       orphenadrine 100 MG 12 hr tablet    NORFLEX    60 tablet    Take 1 tablet (100 mg) by mouth 2 times  daily as needed for muscle spasms or moderate to severe pain       * oxyCODONE 5 MG capsule    OXY-IR     Take 5 mg by mouth every 6 hours as needed for moderate to severe pain Reported on 4/3/2017       * oxyCODONE 10 MG IR tablet    ROXICODONE    21 tablet    Take 1 tablet (10 mg) by mouth every 8 hours as needed for moderate to severe pain       * oxyCODONE 10 MG IR tablet    ROXICODONE    6 tablet    Take 1 tablet (10 mg) by mouth every 8 hours as needed for moderate to severe pain       * oxyCODONE 5 MG IR tablet    ROXICODONE    15 tablet    May take one tablet every 6 hours max of 3 tabs per day and will allow #15 tablets per month. Dispense on/after 5/4/17.  Begin on 5/6/2017 and must last 30 days.       oxyCODONE-acetaminophen 5-325 MG per tablet    PERCOCET    21 tablet    1 tab BID for 7 days then 1 tab qd       * pregabalin 25 MG capsule    LYRICA    60 capsule    Take 25mg in the morning and afternoon       * pregabalin 50 MG capsule    LYRICA    30 capsule    Take 1 capsule (50 mg) by mouth every evening       tamsulosin 0.4 MG capsule    FLOMAX    30 capsule    Take 1 capsule (0.4 mg) by mouth daily       * Notice:  This list has 10 medication(s) that are the same as other medications prescribed for you. Read the directions carefully, and ask your doctor or other care provider to review them with you.

## 2017-05-19 ENCOUNTER — OFFICE VISIT (OUTPATIENT)
Dept: ORTHOPEDICS | Facility: CLINIC | Age: 52
End: 2017-05-19
Payer: COMMERCIAL

## 2017-05-19 VITALS — HEIGHT: 76 IN | BODY MASS INDEX: 27.4 KG/M2 | WEIGHT: 225 LBS

## 2017-05-19 DIAGNOSIS — M25.562 LEFT KNEE PAIN, UNSPECIFIED CHRONICITY: Primary | ICD-10-CM

## 2017-05-19 PROCEDURE — 99244 OFF/OP CNSLTJ NEW/EST MOD 40: CPT | Performed by: PEDIATRICS

## 2017-05-19 NOTE — TELEPHONE ENCOUNTER
Contact Attempt      Outreach attempted to pt x 2.  Left message on voicemail stated just wanted to make sure he had rcvd the previous message. Stated if he did not call back in 1-2 days then would close encounter.     Plan:  Will await for CB.     Sondra Hearn RN, [facility]  Pain Clinic Care Coordinator

## 2017-05-19 NOTE — MR AVS SNAPSHOT
After Visit Summary   5/19/2017    Jailene Breen    MRN: [id number]           Patient Information     Date Of Birth          1965        Visit Information        Provider Department      5/19/2017 10:40 AM Josias Danielson DO [facility] [place]        Today's Diagnoses     Left knee pain, unspecified chronicity    -  1      Care Instructions     Advanced imaging is done by appointment. Some insurance require a prior authorization to be completed which may delay the time until you are able to schedule your appointment. You should be receiving a call from the scheduling department, if you have not heard from them in 24-48 hours.   Please call [place] [facility]: [phone] to schedule your MRI.  Depending on your availability you can usually schedule within the next 1-2 days.    The clinic will call you with results, if you have not heard from the clinic within 3-4 days following your MRI please contact us at the number listed below.     If you have any further questions for your physician or physician s care team you can call [phone] and use option 3 to leave a voice message. Calls received during business hours will be returned same day.                Follow-ups after your visit        Your next 10 appointments already scheduled     May 19, 2017 10:40 AM CDT   New Visit with DO [facility] [place] ([facility] [place])    [address]  [place] [address]   [phone]            May 31, 2017  9:15 AM CDT   Radiology Injections with Silvia Gamble MD   [facility] [place] ([facility] [place])    [address]  [place] [address]   [phone]            Jun 06, 2017  3:00 PM CDT   Return Visit with JUANITA Ga CNP   [facility] [place] ([facility] [place])    [address]  "[place] [address] [phone]              Future tests that were ordered for you today     Open Future Orders        Priority Expected Expires Ordered    MR Knee Left w/o Contrast Routine  5/19/2018 5/19/2017            Who to contact     If you have questions or need follow up information about today's clinic visit or your schedule please contact [facility] directly at [phone].  Normal or non-critical lab and imaging results will be communicated to you by BrightDoor Systemshart, letter or phone within 4 business days after the clinic has received the results. If you do not hear from us within 7 days, please contact the clinic through [email or web address] or phone. If you have a critical or abnormal lab result, we will notify you by phone as soon as possible.  Submit refill requests through [employer] or call your pharmacy and they will forward the refill request to us. Please allow 3 business days for your refill to be completed.          Additional Information About Your Visit        BrightDoor SystemsharZuldi Information     P[employer] gives you secure access to your electronic health record. If you see a primary care provider, you can also send messages to your care team and make appointments. If you have questions, please call your primary care clinic.  If you do not have a primary care provider, please call [phone] and they will assist you.        Care EveryWhere ID     This is your Care EveryWhere ID. This could be used by other organizations to access your [place] medical records  [id number]        Your Vitals Were     Height BMI (Body Mass Index)                6' 4\" (1.93 m) 27.39 kg/m2           Blood Pressure from Last 3 Encounters:   05/17/17 (!) 148/94   04/26/17 141/80   04/03/17 141/86    Weight from Last 3 Encounters:   05/19/17 225 lb (102.1 kg)   05/17/17 227 lb 9.6 oz (103.2 kg)   03/31/17 218 lb (98.9 kg)               Primary Care Provider Office Phone # Fax #    Aiden Resendez, " [phone]        [facility] [address]  [facility] [address]        Thank you!     Thank you for choosing [facility]  for your care. Our goal is always to provide you with excellent care. Hearing back from our patients is one way we can continue to improve our services. Please take a few minutes to complete the written survey that you may receive in the mail after your visit with us. Thank you!             Your Updated Medication List - Protect others around you: Learn how to safely use, store and throw away your medicines at www.disposemymeds.org.          This list is accurate as of: 5/19/17 10:25 AM.  Always use your most recent med list.                   Brand Name Dispense Instructions for use    busPIRone 15 MG tablet    BUSPAR    90 tablet    O.5 tabs PO BID for 3 days then 0.5 tab qam and 1 tab qhs for 3 days then 1 tab bid for 3 days.  If still sympotmatic,  may increase to 1 tab QAM and 1.5 tabs at bedtime for 3 days, then 1.5 tabs BID for 3 days, then 1.5 tabs q am and 2 tabs QHS for 3 days, then 2 tabs BID.       * clonazePAM 1 MG tablet    klonoPIN    4 tablet    Take 0.5-1 tablets (0.5-1 mg) by mouth 2 times daily as needed for anxiety       * clonazePAM 1 MG tablet    klonoPIN    4 tablet    Take 0.5-1 tablets (0.5-1 mg) by mouth 2 times daily as needed for anxiety       cyclobenzaprine 5 MG tablet    FLEXERIL     Take 5 mg by mouth Reported on 5/19/2017       DULoxetine 30 MG EC capsule    CYMBALTA    60 capsule    1 tab daily for 1 week then 1 tab BID       FLUoxetine 20 MG capsule    PROzac    90 capsule    Take 3 capsules (60 mg) by mouth daily       folic acid 1 MG tablet    FOLVITE     Reported on 5/19/2017       * HUMIRA 40 MG/0.8ML prefilled syringe kit   Generic drug:  adalimumab      Inject 40 mg Subcutaneous Reported on 5/19/2017       * HUMIRA PEN 40 MG/0.8ML pen kit   Generic drug:  adalimumab          hydrOXYzine 25 MG  tablet    ATARAX    90 tablet    Take 1-2 tablets (25-50 mg) by mouth every 6 hours as needed for anxiety       IBUPROFEN PO      Take 800 mg by mouth every 6 hours as needed for moderate pain Reported on 5/19/2017       methocarbamol 750 MG tablet    ROBAXIN    60 tablet    Take 1 tablet (750 mg) by mouth 3 times daily as needed for muscle spasms       methotrexate 2.5 MG tablet CHEMO      6 tablets Reported on 4/3/2017       methylPREDNISolone 4 MG tablet    MEDROL DOSEPAK    21 tablet    Follow package instructions       * nicotine polacrilex 2 MG gum    NICORETTE     Reported on 3/31/2017       * nicotine polacrilex 2 MG gum    EQ NICOTINE POLACRILEX    40 tablet    Place 1 each (2 mg) inside cheek as needed for smoking cessation       nortriptyline 10 MG capsule    PAMELOR    180 capsule    3 tabs at bedtime. If chronic pain continues, may continue increasing dose 1 additional tab at bedtime every 3 nights until gets to 50 mg and we can switch to 25 mg tabs.       orphenadrine 100 MG 12 hr tablet    NORFLEX    60 tablet    Take 1 tablet (100 mg) by mouth 2 times daily as needed for muscle spasms or moderate to severe pain       * oxyCODONE 5 MG capsule    OXY-IR     Take 5 mg by mouth every 6 hours as needed for moderate to severe pain Reported on 4/3/2017       * oxyCODONE 10 MG IR tablet    ROXICODONE    21 tablet    Take 1 tablet (10 mg) by mouth every 8 hours as needed for moderate to severe pain       * oxyCODONE 10 MG IR tablet    ROXICODONE    6 tablet    Take 1 tablet (10 mg) by mouth every 8 hours as needed for moderate to severe pain       * oxyCODONE 5 MG IR tablet    ROXICODONE    15 tablet    May take one tablet every 6 hours max of 3 tabs per day and will allow #15 tablets per month. Dispense on/after 5/4/17.  Begin on 5/6/2017 and must last 30 days.       oxyCODONE-acetaminophen 5-325 MG per tablet    PERCOCET    21 tablet    1 tab BID for 7 days then 1 tab qd       * pregabalin 25 MG capsule     LYRICA    60 capsule    Take 25mg in the morning and afternoon       * pregabalin 50 MG capsule    LYRICA    30 capsule    Take 1 capsule (50 mg) by mouth every evening       tamsulosin 0.4 MG capsule    FLOMAX    30 capsule    Take 1 capsule (0.4 mg) by mouth daily       * Notice:  This list has 12 medication(s) that are the same as other medications prescribed for you. Read the directions carefully, and ask your doctor or other care provider to review them with you.

## 2017-05-19 NOTE — PROGRESS NOTES
Sports Medicine Clinic Visit    PCP: Aiden Resendez Jamshid is a 51 year old male who is seen  in consultation at the request of Nay Simmons NP presenting with left knee pain.  Pain began a few weeks ago during an 8 mile run.  Pain began around mile 5.  Pain in the anterior aspect of his knee.  Pain does travel down his leg with activity.  Was seen at urgent care and did have x rays taken.  Has been using a knee sleeve.  Affecting his sleep.  Pain with ascending and descending stairs.   Has been using TENS unit which is helpful for rest, but does not decrease pain with activity.     Injury: acute onset while running  Originated in knee; would radiate to level of ankle. Thought maybe shin splints.  When not moving, then notes pain only in knee.  Pain in left knee is different than what he had previously leading to surgery on right.  Has not noted focal tenderness.    **  Right knee scope last done in 9/16. Had torn meniscus.    **  He notes he had left knee swelling previously, describes effusion with peripatellar swelling/prominence. That has improved.    Location of Pain: left anterior knee  Duration of Pain: 2-3 week(s)  Rating of Pain at worst: 10/10  Rating of Pain Currently: 6/10  Symptoms are better with: Rest  Symptoms are worse with: activity  Additional Features:   Positive: swelling and catching   Negative: bruising, popping, grinding, locking, instability, paresthesias, numbness, weakness, pain in other joints and systemic symptoms  Other evaluation and/or treatments so far consists of: x rays  Prior History of related problems: denies for left, has had 3 surgeries on right.     Social History: currently unemployed, .     Review of Systems  Musculoskeletal: as above  Remainder of review of systems is negative including constitutional, CV, pulmonary, GI, Skin and Neurologic except as noted in HPI or medical history.    Past Medical History:   Diagnosis Date     Ankylosing  "spondylitis (H) 3/1/2017     Anxiety      Arthritis     back, knees     Back pain     possible drug seeking behavior in the past.      Panic attacks      Past Surgical History:   Procedure Laterality Date     ARTHROSCOPY KNEE Right 9/22/2016    Procedure: ARTHROSCOPY KNEE;  Surgeon: Fredo Hughes MD;  Location: [facility] OR     INJECT PARAVERTEBRAL FACET JOINT LUMBAR / SACRAL FIRST Right 11/25/2016    Procedure: INJECT PARAVERTEBRAL FACET JOINT LUMBAR / SACRAL FIRST;  Surgeon: Doretha Magallanes MD;  Location: [facility] OR     ORTHOPEDIC SURGERY      Rt knee X 2     ORTHOPEDIC SURGERY      Lt foot     No family history on file.  Social History     Social History     Marital status:      Spouse name: N/A     Number of children: N/A     Years of education: N/A     Occupational History     Not on file.     Social History Main Topics     Smoking status: Never Smoker     Smokeless tobacco: Current User     Types: Chew      Comment: Chew     Alcohol use No      Comment: none     Drug use: No     Sexual activity: Yes     Partners: Female      Comment: decreased with Prozac     Other Topics Concern     Not on file     Social History Narrative       Objective  Ht 6' 4\" (1.93 m)  Wt 225 lb (102.1 kg)  BMI 27.39 kg/m2    GENERAL APPEARANCE: healthy, alert and no distress   GAIT: antalgic  SKIN: no suspicious lesions or rashes  NEURO: Normal strength and tone, mentation intact and speech normal  PSYCH:  mentation appears normal and affect normal/bright  HEENT: no scleral icterus  CV: no extremity edema  RESP: nonlabored breathing    Left Knee exam    ROM:        Flexion 110 degrees       Extension full passively, actively to ~5-10 degrees       Range of motion limited by pain       Pain with active extension when seated    Inspection:       no visible ecchymosis       no visible edema or effusion    Skin:       no visible deformities       well perfused       capillary refill brisk    Patellar Motion:        Crepitus " noted in the patellofemoral joint mild left, more prominent right    Tender:        lateral patellar border       lateral joint line       Proximal patellar tendon  Pain with patellar translation    Non Tender:         remainder of knee area    Special Tests:        Pain anterior and lateral with Navdeep       neg (-) Lachman       neg (-) anterior drawer       neg (-) posterior drawer       neg (-) varus        neg (-) valgus        + mild lateral pain with forced extension    Evaluation of ipsilateral kinetic chain  Pain with attempted straight leg raise, deep anterior knee pain      Radiology  Visualized radiographs of left knee 5/17/17, and reviewed the images with the patient.  Impression: no acute findings. Joint spaces appear fairly well preserved; mild peripatellar spurring noted.  Report reviewed:    Results for orders placed or performed in visit on 05/17/17   XR Knee Left 1/2 Views    Narrative    XR KNEE LT 1/2 VW 5/17/2017 12:13 PM     HISTORY: Pain in left knee    COMPARISON: None      Impression    IMPRESSION: No evidence of fracture. Mild degenerative changes in the  patellofemoral compartment.    JOCELIN SCOTT MD       Assessment:  1. Left knee pain, unspecified chronicity    likely patellofemoral source, though also some lateral knee pain and tenderness, possibly meniscus. He also has pain into the lower leg, but I think that is more radiation rather than other source such as neurologic.  Also history of autoimmune disorder, but I don't think that's clearly related.    Plan:  Discussed the assessment with the patient.  We discussed the following treatment options: symptom treatment, activity modification/rest, imaging, rehab, injection therapy and support for the affected area. Following discussion, plan:  Topical Treatments: Ice prn pain, swelling  Over the counter medication: Patient's preferred OTC medication as directed on packaging.  MRI of the knee next step. We discussed  observation/symptom tx/activity modification for longer period of time, vs imaging. He is somewhat anxious about the pain, and prefers MRI.  Activity Modification: reviewed  Rehab: Physical Therapy: consideration pending MRI  Medical Equipment: has open knee sleeve, may continue with use with activities or for comfort prn  Discussed consideration of injection therapy, pending MRI  Follow up: call with MRI results.  questions answered. The patient indicates understanding of these issues and agrees with the plan.      Josias Danielson DO, CAQ    CC: Nay Simmons NP      Disclaimer: This note consists of symbols derived from keyboarding, dictation and/or voice recognition software. As a result, there may be errors in the script that have gone undetected. Please consider this when interpreting information found in this chart.

## 2017-05-19 NOTE — PATIENT INSTRUCTIONS
Advanced imaging is done by appointment. Some insurance require a prior authorization to be completed which may delay the time until you are able to schedule your appointment. You should be receiving a call from the scheduling department, if you have not heard from them in 24-48 hours.   Please call [place] [place] [facility] [place]: [phone] to schedule your MRI.  Depending on your availability you can usually schedule within the next 1-2 days.    The clinic will call you with results, if you have not heard from the clinic within 3-4 days following your MRI please contact us at the number listed below.     If you have any further questions for your physician or physician s care team you can call [phone] and use option 3 to leave a voice message. Calls received during business hours will be returned same day.

## 2017-05-19 NOTE — Clinical Note
I had the opportunity to see Jailene Breen in [facility] clinic for his knee issues. Following discussion, plan imaging next. Please see chart for details of the visit. Thanks.

## 2017-05-22 NOTE — TELEPHONE ENCOUNTER
Pt was seen by Dr. Danielson on 5/19 for knee pain. An MRI of the left knee was ordered.     LETHA Parker, RN-BC  Patient Care Supervisor/Care Coordinator  [facility]

## 2017-05-24 ENCOUNTER — MYC MEDICAL ADVICE (OUTPATIENT)
Dept: ORTHOPEDICS | Facility: CLINIC | Age: 52
End: 2017-05-24

## 2017-05-24 ENCOUNTER — TELEPHONE (OUTPATIENT)
Dept: PALLIATIVE MEDICINE | Facility: CLINIC | Age: 52
End: 2017-05-24

## 2017-05-24 DIAGNOSIS — M25.562 ACUTE PAIN OF LEFT KNEE: Primary | ICD-10-CM

## 2017-05-24 DIAGNOSIS — F17.200 TOBACCO USE DISORDER: ICD-10-CM

## 2017-05-24 NOTE — TELEPHONE ENCOUNTER
"Patient states he is in severe pain left knee. \"I can hardly walk and I have an MRI tomorrow\".  Has nothing to take for the pain. Requesting pain medication.  Pt states he can  script at .     Deyanira MOLINA    [facility]    "

## 2017-05-24 NOTE — TELEPHONE ENCOUNTER
Patient requesting pain medication, under a controlled substance agreement, please advise  Mariella Christie MS ATC

## 2017-05-24 NOTE — TELEPHONE ENCOUNTER
nicotine polacrilex (EQ NICOTINE POLACRILEX) 2 MG gum     Last Written Prescription Date: 05/12/17  Last Fill Quantity: 40, # refills: 1  Last Office Visit with [facility], [facility] or [facility] prescribing provider: 03/01/17     Next 5 appointments (look out 90 days)     Jun 06, 2017  3:00 PM CDT   Return Visit with JUANITA Ga CNP   [facility] [place] ([facility] [place])    [id number]  [place] [id number] [phone]                   BP Readings from Last 3 Encounters:   05/17/17 (!) 148/94   04/26/17 141/80   04/03/17 141/86         Renetta Calvo  [facility]

## 2017-05-24 NOTE — TELEPHONE ENCOUNTER
We give pt :    oxyCODONE (ROXICODONE) 5 MG IR tablet 15 tablet 0 4/26/2017  No   Sig: May take one tablet every 6 hours max of 3 tabs per day and will allow #15 tablets per month. Dispense on/after 5/4/17.  Begin on 5/6/2017 and must last 30 days.     Per the encounter on 5/16/2017 the knee injury was sustained when he went on an 8 mile run. He saw Dr. Danielson on 5/19/2017 who recommended:     Topical Treatments: Ice prn pain, swelling  Over the counter medication: Patient's preferred OTC medication as directed on packaging.  MRI of the knee next step. We discussed observation/symptom tx/activity modification for longer period of time, vs imaging. He is somewhat anxious about the pain, and prefers MRI.  Activity Modification: reviewed  Rehab: Physical Therapy: consideration pending MRI  Medical Equipment: has open knee sleeve, may continue with use with activities or for comfort prn  Discussed consideration of injection therapy, pending MRI    Sending to provider to review.     Sondra Hearn RN, [facility]  Pain Clinic Care Coordinator

## 2017-05-24 NOTE — TELEPHONE ENCOUNTER
This is being addressed under Susana Pike.     Sondra Hearn RN, [facility]  Pain Clinic Care Coordinator

## 2017-05-25 ENCOUNTER — RADIANT APPOINTMENT (OUTPATIENT)
Dept: MRI IMAGING | Facility: CLINIC | Age: 52
End: 2017-05-25
Attending: PEDIATRICS
Payer: COMMERCIAL

## 2017-05-25 DIAGNOSIS — M25.562 LEFT KNEE PAIN, UNSPECIFIED CHRONICITY: ICD-10-CM

## 2017-05-25 PROCEDURE — 73721 MRI JNT OF LWR EXTRE W/O DYE: CPT | Mod: TC

## 2017-05-25 RX ORDER — OXYCODONE HYDROCHLORIDE 5 MG/1
TABLET ORAL
Qty: 14 TABLET | Refills: 0 | Status: SHIPPED | OUTPATIENT
Start: 2017-05-25 | End: 2017-05-31

## 2017-05-25 NOTE — TELEPHONE ENCOUNTER
I spoke with Dr. Levine re: this patient yesterday.  I will allow a short term script of opiate medication for this acute knee pain. Patient should also be using the brace and icing and elevating his knee as previously recommended by Dr. Levine. This is a time time short term script for this acute pain  Signed Prescriptions:                        Disp   Refills    oxyCODONE (ROXICODONE) 5 MG IR tablet      14 tab*0        Sig: May take 1 tablet every 6 hours as needed max of 4           tabs per day for 2 days, then reduce to max of 3           tabs per day for 1 day, then down to max of 2           tabs for 1 day, then take 1 tab per day for 1 day           and stop. OK to fill and begin on 5/25/2017. This           is a one time short term script  Authorizing Provider: TAMICA PETTY    Signed script given to Sondra Hearn RN at [facility]    Tamica GRANT, RN CNP, FNP  [facility] [place]

## 2017-05-25 NOTE — TELEPHONE ENCOUNTER
Gave oxycodone prescription to patient. Reviewed directions for short term use.  Pt states that he just had an MRI and is hoping that this will shed some light on what is causing this severe pain.      LETHA Parker, RN-BC  Patient Care Supervisor/Care Coordinator  [facility] [place]

## 2017-05-30 ENCOUNTER — TELEPHONE (OUTPATIENT)
Dept: ORTHOPEDICS | Facility: CLINIC | Age: 52
End: 2017-05-30

## 2017-05-30 NOTE — TELEPHONE ENCOUNTER
Results for orders placed or performed in visit on 05/25/17   MR Knee Left w/o Contrast    Narrative    MR KNEE LEFT WITHOUT CONTRAST   5/25/2017 10:07 AM    HISTORY: Left knee pain.    COMPARISON: Radiographs on 5/17/2017.    TECHNIQUE: Transverse and coronal T2 with fat suppression. Coronal T1.  Sagittal proton density and T2.    FINDINGS:     Medial Meniscus: There is mild myxoid degeneration in the posterior  horn with no tear demonstrated.     Lateral Meniscus: No tear, displaced fragment, or extrusion.      Anterior Cruciate Ligament: Unremarkable.     Posterior Cruciate Ligament: Unremarkable.     Medial Collateral Ligament: Unremarkable.    Lateral Collateral Ligament Complex, Popliteus Tendon: The iliotibial  band, fibular collateral ligament, biceps femoris tendon, and  popliteus tendon are unremarkable.    Osseous and Cartilaginous Structures: No fracture or osseous lesion is  demonstrated. There is moderate marrow edema seen within the anterior  aspect of the lateral femoral condyle, predominantly in the epiphysis  but also extending into the metaphysis. No other abnormal marrow  signal intensity is seen. There is grade 2 chondromalacia throughout  the weightbearing portion of the medial femoral condyle and also in  the medial patellar facet. The remaining cartilage surfaces are well  preserved.     Extensor Mechanism: The quadriceps and patellar tendons are  unremarkable. The medial and lateral patellar retinacula appear  unremarkable.    Joint Space: No joint effusion. No definite loose bodies appreciated.    Additional Findings: There is a just over 1 cm diameter Baker's cyst.  No semimembranosus-tibial collateral ligament or pes anserine  bursitis. No adjacent soft tissue pathology is seen.      Impression    IMPRESSION:  1. Bone contusion of the anterior aspect of the lateral femoral  condyle. No fracture is demonstrated. Although this can be seen  following lateral patellar dislocations, there is  no additional  evidence of a recent lateral patellar dislocation. Clinical  correlation is recommended.  2. 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    DAVIS CASTORENA MD

## 2017-05-31 ENCOUNTER — RADIOLOGY INJECTION OFFICE VISIT (OUTPATIENT)
Dept: PALLIATIVE MEDICINE | Facility: CLINIC | Age: 52
End: 2017-05-31
Payer: COMMERCIAL

## 2017-05-31 ENCOUNTER — RADIANT APPOINTMENT (OUTPATIENT)
Dept: RADIOLOGY | Facility: CLINIC | Age: 52
End: 2017-05-31
Attending: PSYCHIATRY & NEUROLOGY

## 2017-05-31 VITALS
SYSTOLIC BLOOD PRESSURE: 131 MMHG | DIASTOLIC BLOOD PRESSURE: 91 MMHG | OXYGEN SATURATION: 97 % | HEART RATE: 85 BPM | RESPIRATION RATE: 16 BRPM

## 2017-05-31 DIAGNOSIS — M47.819 FACET ARTHROPATHY: Primary | ICD-10-CM

## 2017-05-31 DIAGNOSIS — M47.816 FACET ARTHROPATHY, LUMBAR: ICD-10-CM

## 2017-05-31 PROCEDURE — 99152 MOD SED SAME PHYS/QHP 5/>YRS: CPT | Performed by: PSYCHIATRY & NEUROLOGY

## 2017-05-31 PROCEDURE — 64635 DESTROY LUMB/SAC FACET JNT: CPT | Mod: RT | Performed by: PSYCHIATRY & NEUROLOGY

## 2017-05-31 PROCEDURE — 64636 DESTROY L/S FACET JNT ADDL: CPT | Mod: RT | Performed by: PSYCHIATRY & NEUROLOGY

## 2017-05-31 RX ORDER — OXYCODONE HYDROCHLORIDE 5 MG/1
5-10 TABLET ORAL EVERY 4 HOURS PRN
Qty: 50 TABLET | Refills: 0 | Status: SHIPPED | OUTPATIENT
Start: 2017-05-31 | End: 2017-06-22

## 2017-05-31 RX ORDER — OXYCODONE HYDROCHLORIDE 5 MG/1
5-10 TABLET ORAL EVERY 4 HOURS PRN
Qty: 50 TABLET | Refills: 0 | Status: SHIPPED | OUTPATIENT
Start: 2017-05-31 | End: 2017-05-31

## 2017-05-31 ASSESSMENT — PAIN SCALES - GENERAL
PAINLEVEL: WORST PAIN (10)
PAINLEVEL: SEVERE PAIN (7)

## 2017-05-31 NOTE — NURSING NOTE
"Chief Complaint   Patient presents with     Pain       Initial /86  Pulse 72 Estimated body mass index is 27.39 kg/(m^2) as calculated from the following:    Height as of 5/19/17: 1.93 m (6' 4\").    Weight as of 5/19/17: 102.1 kg (225 lb).  Medication Reconciliation: complete     Injection intake:    If this procedure is requiring IV sedation has patient been NPO for 6  Hours? Yes    Is patient on coumadin, plavix or other prescribed blood thinner?   No    If patient is on coumadin was it held for 5 days?   NA    If patient is on plavix was it held for 7 days?    NA     Does patient take aspirin?  No    If this is for a cervical procedure and patient is on aspirin has it been held for 6 days?   NA    Any allergies to contrast dye, iodine, steroid and/or numbing medications?  NO    Is patient currently taking antibiotics or have an active infection?  NO    Does patient have a ? Yes       Is patient pregnant or breastfeeding?  Not Applicable    Are the vital signs normal?  Yes    Monica Palma CMA (AAMA)        "

## 2017-05-31 NOTE — NURSING NOTE
20 gauge Peripheral IV inserted into left anticubital - attempts: 1    Sondra Hearn RN, [facility]  Pain Clinic Care Coordinator

## 2017-05-31 NOTE — NURSING NOTE
[facility] [place] [facility] [place] [facility] To fill now, as patient is in procedure.    Monica Palma CMA ([facility])

## 2017-05-31 NOTE — NURSING NOTE
MD Time IN: 0945  Sedation start time: 0950  MD Time OUT: 1020    Medications given: fentanyl 50 mcg IV; versed 1.5 mg IV  Intravenous fluids were administered, normal saline 150 cc's.  Sedation Level Achieved:  Minimal sedation    Sondra Hearn RN, [facility]  Pain Clinic Care Coordinator

## 2017-05-31 NOTE — PATIENT INSTRUCTIONS
Anticipate pain for up to 2 weeks after this procedure.  Pain medication has been prescribed to you for this. Oxycodone 5 mg take 1-2 tablets (5-10 mg) by mouth every 4 hours as needed for pain maximum 8 tablet(s) per day  It may take up to 4-6 weeks to receive relief from the radiofrequency ablation .  Follow up with provider - You already have a follow up with Susana Pike on 6/6/2017- keep that appointment.   [facility] Procedure Discharge Instructions   Nurse line: [phone]   Appt line: [phone]   If you have received sedation before/during/after your procedure, for the next 24 hours you shall not:   -Drive   -Operate machinery   -Drink alcohol   -Sign any legal documents   You may resume your normal diet   Avoid strenuous activity for the first 24 hours   Be cautious with walking as numbness and/or weakness in the lower extremities up to 6-8 hours may occur due to effect of local anesthetic   You may resume your regular activities after 24 hours   You may resume your regular medications after the procedure   You may shower, however no swimming or tub baths or hot tubs for 24 hours following your procedure   You may use ice packs 10-15 minutes three to four times a day at the injection site for comfort   You may use anti-inflammatory medications (such as Ibuprofen or Aleve or Advil) or Tylenol for pain control if necessary   If you experience any of the following, call the pain center nursing line during work hours at [phone] or on call physician after hours at [phone]:  -Fever over 100 degree F   -Swelling, bleeding, redness, drainage, warmth at the injection site   -Progressive weakness or numbness on your legs   -Loss of bowel or bladder function   -Unusual new onset of pain that is not improving

## 2017-05-31 NOTE — PROGRESS NOTES
Pre procedure Diagnosis: facet arthropathy   Post procedure Diagnosis: Same  Procedure performed: Right L3,L4,L5 radiofrequency ablation   Anesthesia: moderate sedation with Midazolam 1.5 mg and Fentanyl 50 mcg  Complications: none  Operators: Ashlyn Gamble MD, and Rajesh Alanis MD    Indications:   Jailene Breen is a 51 year old male was sent by Susana Pike for lumbar facet medial branch blocks and radiofrequency ablation.  They have a history of chronic, axial low back pain.  Exam shows pain with lumbar extension and side-bending, and they have tried conservative treatment including PT and medications.    Jailene Breen had medial branch blocks showing appropriate pain relief, therefore radiofrequency ablation will be done.     Options/alternatives, benefits and risks were discussed with the patient including bleeding, infection, no pain relief, tissue trauma, exposure to radiation, reaction to medications including seizure, spinal cord injury,increased pain after the procedure, weakness, numbness or sensory changes and headache.   We also discussed risks of sedation, including reaction to medications and cardiovascular collapse.    Questions were answered to his satisfaction and he agrees to proceed. Voluntary informed consent was obtained and signed.     Vitals were reviewed: Yes  Allergies were reviewed:  Yes   Medications were reviewed:  Yes   Pre-procedure pain score: 10/10    Procedure:  After getting informed consent, patient was brought into the procedure suite and was placed in a prone position on the procedure table.   A Pause for the Cause was performed.  Patient was prepped and draped in sterile fashion.     Jailene Breen had an IV line placed prior the procedure.  The C-arm was positioned in the right oblique view to afford optimal view of the L4-L5 vertebral bodies. Lidocaine 1% with bicarbonate was used to anesthetize the skin at each level.  At each level, a 10cm, 20G curved  radiofrequency cannula with a 10mm active tip was positioned, overlying the intersection of the transverse process and pedicle at L4 & L5, and was advanced under intermittent fluoroscopy until it contacted the transverse process and notch, and the tip slightly overran that process, just lateral to the mamillary process.  The position of each cannula was verified and optimized in the oblique view and AP views.    In the AP view, another cannula was placed at the sacral alar notch.      Each position was tested for motor and sensory stimulation, and was positioned so that stimulation was negative for stimuli outside the immediate area of the desired lesion.  Sensory stimulation was completed at 50 Hz, with max stimulation up to 0.8V.  Motor stimulation was completed at 2Hz, up to 2.5V.   Bupivacaine mixed 1:1 with lidocaine 1% 1ml was injected, and a 90 second, 80 degree Centigrade lesion was generated.    The needles were then rotated within the pathway of the medial branch, and locations were evaluated with repeat imaging.  Motor testing was again completed, and showed appropriate stimulation.  A second lesion was then generated at each location.    The needles were flushed with the local mixture as they were withdrawn.  Hemostasis was achieved.      Hemostasis was achieved, the area was cleaned, and bandaids were placed when appropriate.  The patient tolerated the procedure well, and was taken to the recovery room.    Images were saved to PACS.    Start sedation time: 0950  End sedation time: 1020    Post-procedure pain score: 7/10  Follow-up includes:   -f/u phone call in one week  -post-procedure pain medications: oxycodone 5-10 mg PO Q6 hours PRN pain  -f/u with Susana Pike as scheduled    Ashlyn Gamble MD  [facility]       [facility] Procedure Note        Sedation:      Performed by: Silvia Gamble  Authorized by: Silvia Gambel    Pre-Procedure Assessment done at  0930.    Expected Level:  Moderate Sedation    Indication:  Sedation is required to allow for RFA    Consent obtained from patient after discussing the risks, benefits and alternatives.    PO Intake:  Appropriately NPO for procedure    ASA Class:  Class 2 - MILD SYSTEMIC DISEASE, NO ACUTE PROBLEMS, NO FUNCTIONAL LIMITATIONS.    Mallampati:  Grade 2:  Soft palate, base of uvula, tonsillar pillars, and portion of posterior pharyngeal wall visible    Lungs: Lungs Clear with good breath sounds bilaterally.     Heart: Normal heart sounds and rate    History and physical reviewed and no updates needed. I have reviewed the lab findings, diagnostic data, medications, and the plan for sedation. I have determined this patient to be an appropriate candidate for the planned sedation and procedure and have reassessed the patient IMMEDIATELY PRIOR to sedation and procedure.      Sedation Post Procedure Summary:    Prior to the start of the procedure and with procedural staff participation, I verbally confirmed the patient s identity using two indicators, relevant allergies, that the procedure was appropriate and matched the consent or emergent situation, and that the correct equipment/implants were available. Immediately prior to starting the procedure I conducted the Time Out with the procedural staff and re-confirmed the patient s name, procedure, and site/side. (The Joint Commission universal protocol was followed.)  Yes      Sedatives: Fentanyl and Midazolam (Versed)    Vital signs, airway and pulse oximetry were monitored and remained stable throughout the procedure and sedation was maintained until the procedure was complete.  The patient was monitored by staff until sedation discharge criteria were met.    Patient tolerance: Patient tolerated the procedure well with no immediate complications.    Time of sedation in minutes:  30 minutes from beginning to end of physician one to one monitoring.

## 2017-05-31 NOTE — TELEPHONE ENCOUNTER
Discussed with Dr. Danielson, bone bruising.  Limit weight bearing and activities.  F/U in 2-3 weeks  Discussed with patient.  He states he has crutches and will be able to use them.  Continues to have pain with walking and with stairs.  He asked if he could undergo a steroid injection.  Explained that we would need to allow the bone to heal prior to any injections, so non weight bearing would be the treatment.  He will follow up in 2-3 weeks  Mariella Christie MS ATC

## 2017-05-31 NOTE — MR AVS SNAPSHOT
After Visit Summary   5/31/2017    Jailene Breen    MRN: [id number]           Patient Information     Date Of Birth          1965        Visit Information        Provider Department      5/31/2017 9:15 AM Silvia Gamble MD [facility]        Today's Diagnoses     Facet arthropathy (H)    -  1      Care Instructions    Anticipate pain for up to 2 weeks after this procedure.  Pain medication has been prescribed to you for this. Oxycodone 5 mg take 1-2 tablets (5-10 mg) by mouth every 4 hours as needed for pain maximum 8 tablet(s) per day  It may take up to 4-6 weeks to receive relief from the radiofrequency ablation .  Follow up with provider - You already have a follow up with Susana Pike on 6/6/2017- keep that appointment.   [facility] Procedure Discharge Instructions   Nurse line: [phone]   Appt line: [phone]   If you have received sedation before/during/after your procedure, for the next 24 hours you shall not:   -Drive   -Operate machinery   -Drink alcohol   -Sign any legal documents   You may resume your normal diet   Avoid strenuous activity for the first 24 hours   Be cautious with walking as numbness and/or weakness in the lower extremities up to 6-8 hours may occur due to effect of local anesthetic   You may resume your regular activities after 24 hours   You may resume your regular medications after the procedure   You may shower, however no swimming or tub baths or hot tubs for 24 hours following your procedure   You may use ice packs 10-15 minutes three to four times a day at the injection site for comfort   You may use anti-inflammatory medications (such as Ibuprofen or Aleve or Advil) or Tylenol for pain control if necessary   If you experience any of the following, call the pain center nursing line during work hours at [phone] or on call physician after hours at [phone]:  -Fever over 100 degree F   -Swelling, bleeding, redness,  drainage, warmth at the injection site   -Progressive weakness or numbness on your legs   -Loss of bowel or bladder function   -Unusual new onset of pain that is not improving            Follow-ups after your visit        Your next 10 appointments already scheduled     Jun 06, 2017  3:00 PM CDT   Return Visit with JUANITA Ga CNP   [facility] ([facility])    [address]  [place] [address] [phone]              Who to contact     If you have questions or need follow up information about today's clinic visit or your schedule please contact [facility] directly at [phone].  Normal or non-critical lab and imaging results will be communicated to you by MyChart, letter or phone within 4 business days after the clinic has received the results. If you do not hear from us within 7 days, please contact the clinic through [employer] or phone. If you have a critical or abnormal lab result, we will notify you by phone as soon as possible.  Submit refill requests through [employer] or call your pharmacy and they will forward the refill request to us. Please allow 3 business days for your refill to be completed.          Additional Information About Your Visit        MyChart Information     [employer] gives you secure access to your electronic health record. If you see a primary care provider, you can also send messages to your care team and make appointments. If you have questions, please call your primary care clinic.  If you do not have a primary care provider, please call [phone] and they will assist you.        Care EveryWhere ID     This is your Care EveryWhere ID. This could be used by other organizations to access your [facility] medical records  [id number]        Your Vitals Were     Pulse Respirations Pulse Oximetry             85 16 97%          Blood Pressure from Last 3 Encounters:   05/31/17 (!) 131/91   05/17/17 (!) 148/94    04/26/17 141/80    Weight from Last 3 Encounters:   05/19/17 102.1 kg (225 lb)   05/17/17 103.2 kg (227 lb 9.6 oz)   03/31/17 98.9 kg (218 lb)              Today, you had the following     No orders found for display         Today's Medication Changes          These changes are accurate as of: 5/31/17 10:25 AM.  If you have any questions, ask your nurse or doctor.               These medicines have changed or have updated prescriptions.        Dose/Directions    * oxyCODONE 5 MG capsule   Commonly known as:  OXY-IR   This may have changed:    - Another medication with the same name was changed. Make sure you understand how and when to take each.  - Another medication with the same name was removed. Continue taking this medication, and follow the directions you see here.   Changed by:  Nay Simmons NP        Dose:  5 mg   Take 5 mg by mouth every 6 hours as needed for moderate to severe pain Reported on 4/3/2017   Refills:  0       * oxyCODONE 10 MG IR tablet   Commonly known as:  ROXICODONE   This may have changed:    - Another medication with the same name was changed. Make sure you understand how and when to take each.  - Another medication with the same name was removed. Continue taking this medication, and follow the directions you see here.   Used for:  Chronic pain syndrome   Changed by:  Aiden Resendez PA        Dose:  10 mg   Take 1 tablet (10 mg) by mouth every 8 hours as needed for moderate to severe pain   Quantity:  21 tablet   Refills:  0       * oxyCODONE 10 MG IR tablet   Commonly known as:  ROXICODONE   This may have changed:    - Another medication with the same name was changed. Make sure you understand how and when to take each.  - Another medication with the same name was removed. Continue taking this medication, and follow the directions you see here.   Used for:  Chronic back pain, unspecified back location, unspecified back pain laterality   Changed by:  Nanette Haywood  HERIBERTO Mojica        Dose:  10 mg   Take 1 tablet (10 mg) by mouth every 8 hours as needed for moderate to severe pain   Quantity:  6 tablet   Refills:  0       * oxyCODONE 5 MG IR tablet   Commonly known as:  ROXICODONE   This may have changed:    - how much to take  - how to take this  - when to take this  - reasons to take this  - additional instructions  - Another medication with the same name was removed. Continue taking this medication, and follow the directions you see here.   Used for:  Facet arthropathy (H)   Changed by:  Silvia Gamble MD        Dose:  5-10 mg   Take 1-2 tablets (5-10 mg) by mouth every 4 hours as needed for pain maximum 8 tablet(s) per day   Quantity:  50 tablet   Refills:  0       * Notice:  This list has 4 medication(s) that are the same as other medications prescribed for you. Read the directions carefully, and ask your doctor or other care provider to review them with you.         Where to get your medicines      Some of these will need a paper prescription and others can be bought over the counter.  Ask your nurse if you have questions.     Bring a paper prescription for each of these medications     oxyCODONE 5 MG IR tablet                Primary Care Provider Office Phone # Fax #    Reinaldomanuel JASON Meyer [phone]        [facility] [place] [address] [address] [address]        Thank you!     Thank you for choosing [facility]  for your care. Our goal is always to provide you with excellent care. Hearing back from our patients is one way we can continue to improve our services. Please take a few minutes to complete the written survey that you may receive in the mail after your visit with us. Thank you!             Your Updated Medication List - Protect others around you: Learn how to safely use, store and throw away your medicines at www.disposemymeds.org.          This list is accurate as of: 5/31/17 10:25 AM.  Always use  your most recent med list.                   Brand Name Dispense Instructions for use    busPIRone 15 MG tablet    BUSPAR    90 tablet    O.5 tabs PO BID for 3 days then 0.5 tab qam and 1 tab qhs for 3 days then 1 tab bid for 3 days.  If still sympotmatic,  may increase to 1 tab QAM and 1.5 tabs at bedtime for 3 days, then 1.5 tabs BID for 3 days, then 1.5 tabs q am and 2 tabs QHS for 3 days, then 2 tabs BID.       * clonazePAM 1 MG tablet    klonoPIN    4 tablet    Take 0.5-1 tablets (0.5-1 mg) by mouth 2 times daily as needed for anxiety       * clonazePAM 1 MG tablet    klonoPIN    20 tablet    Take 0.5-1 tablets (0.5-1 mg) by mouth 2 times daily as needed for anxiety       cyclobenzaprine 5 MG tablet    FLEXERIL     Take 5 mg by mouth Reported on 5/19/2017       DULoxetine 30 MG EC capsule    CYMBALTA    60 capsule    1 tab daily for 1 week then 1 tab BID       FLUoxetine 20 MG capsule    PROzac    90 capsule    Take 3 capsules (60 mg) by mouth daily       folic acid 1 MG tablet    FOLVITE     Reported on 5/19/2017       * HUMIRA 40 MG/0.8ML prefilled syringe kit   Generic drug:  adalimumab      Inject 40 mg Subcutaneous Reported on 5/19/2017       * HUMIRA PEN 40 MG/0.8ML pen kit   Generic drug:  adalimumab          hydrOXYzine 25 MG tablet    ATARAX    90 tablet    Take 1-2 tablets (25-50 mg) by mouth every 6 hours as needed for anxiety       IBUPROFEN PO      Take 800 mg by mouth every 6 hours as needed for moderate pain Reported on 5/19/2017       methocarbamol 750 MG tablet    ROBAXIN    60 tablet    Take 1 tablet (750 mg) by mouth 3 times daily as needed for muscle spasms       methotrexate 2.5 MG tablet CHEMO      6 tablets Reported on 4/3/2017       methylPREDNISolone 4 MG tablet    MEDROL DOSEPAK    21 tablet    Follow package instructions       * nicotine polacrilex 2 MG gum    NICORETTE     Reported on 3/31/2017       * nicotine polacrilex 2 MG gum    EQ NICOTINE POLACRILEX    40 tablet    Place 1  each (2 mg) inside cheek as needed for smoking cessation       nortriptyline 10 MG capsule    PAMELOR    180 capsule    3 tabs at bedtime. If chronic pain continues, may continue increasing dose 1 additional tab at bedtime every 3 nights until gets to 50 mg and we can switch to 25 mg tabs.       orphenadrine 100 MG 12 hr tablet    NORFLEX    60 tablet    Take 1 tablet (100 mg) by mouth 2 times daily as needed for muscle spasms or moderate to severe pain       * oxyCODONE 5 MG capsule    OXY-IR     Take 5 mg by mouth every 6 hours as needed for moderate to severe pain Reported on 4/3/2017       * oxyCODONE 10 MG IR tablet    ROXICODONE    21 tablet    Take 1 tablet (10 mg) by mouth every 8 hours as needed for moderate to severe pain       * oxyCODONE 10 MG IR tablet    ROXICODONE    6 tablet    Take 1 tablet (10 mg) by mouth every 8 hours as needed for moderate to severe pain       * oxyCODONE 5 MG IR tablet    ROXICODONE    50 tablet    Take 1-2 tablets (5-10 mg) by mouth every 4 hours as needed for pain maximum 8 tablet(s) per day       oxyCODONE-acetaminophen 5-325 MG per tablet    PERCOCET    21 tablet    1 tab BID for 7 days then 1 tab qd       * pregabalin 25 MG capsule    LYRICA    60 capsule    Take 25mg in the morning and afternoon       * pregabalin 50 MG capsule    LYRICA    30 capsule    Take 1 capsule (50 mg) by mouth every evening       tamsulosin 0.4 MG capsule    FLOMAX    30 capsule    Take 1 capsule (0.4 mg) by mouth daily       * Notice:  This list has 12 medication(s) that are the same as other medications prescribed for you. Read the directions carefully, and ask your doctor or other care provider to review them with you.

## 2017-05-31 NOTE — NURSING NOTE
Discharge Information    IV Discontiued Time:  1037 catheter intact    Amount of Fluid Infused:  NA    Discharge Criteria = When patient returns to baseline or as per MD order    Consciousness:  Pt is fully awake    Circulation:  BP +/- 20% of pre-procedure level    Respiration:  Patient is able to breathe deeply    O2 Sat:  Patient is able to maintain O2 Sat >92% on room air    Activity:  Moves 4 extremities on command    Ambulation:  Patient is able to stand and walk or stand and pivot into wheelchair    Dressing:  Clean/dry or No Dressing    Notes:   Discharge instructions and AVS given to patient    Patient meets criteria for discharge?  YES    Admitted to PCU?  No    Responsible adult present to accompany patient home?  Yes    Signature/Title:    Rosetta Hearn RN Care Coordinator  [facility]

## 2017-06-02 NOTE — TELEPHONE ENCOUNTER
As noted. No injection at this time as primary issue appears to be bone bruising.  Josias Danielson, , CAQ

## 2017-06-06 ENCOUNTER — OFFICE VISIT (OUTPATIENT)
Dept: PALLIATIVE MEDICINE | Facility: CLINIC | Age: 52
End: 2017-06-06
Payer: COMMERCIAL

## 2017-06-06 VITALS — SYSTOLIC BLOOD PRESSURE: 151 MMHG | HEART RATE: 89 BPM | DIASTOLIC BLOOD PRESSURE: 94 MMHG

## 2017-06-06 DIAGNOSIS — M47.817 LUMBOSACRAL SPONDYLOSIS WITHOUT MYELOPATHY: Primary | ICD-10-CM

## 2017-06-06 DIAGNOSIS — G89.4 CHRONIC PAIN SYNDROME: ICD-10-CM

## 2017-06-06 DIAGNOSIS — M54.50 CHRONIC BILATERAL LOW BACK PAIN WITHOUT SCIATICA: ICD-10-CM

## 2017-06-06 DIAGNOSIS — G89.29 CHRONIC BILATERAL LOW BACK PAIN WITHOUT SCIATICA: ICD-10-CM

## 2017-06-06 DIAGNOSIS — G89.29 CHRONIC PAIN OF LEFT KNEE: ICD-10-CM

## 2017-06-06 DIAGNOSIS — M25.562 CHRONIC PAIN OF LEFT KNEE: ICD-10-CM

## 2017-06-06 PROCEDURE — 99214 OFFICE O/P EST MOD 30 MIN: CPT | Performed by: NURSE PRACTITIONER

## 2017-06-06 RX ORDER — OXYCODONE AND ACETAMINOPHEN 5; 325 MG/1; MG/1
TABLET ORAL
Qty: 21 TABLET | Refills: 0 | Status: SHIPPED | OUTPATIENT
Start: 2017-06-06 | End: 2017-06-22

## 2017-06-06 RX ORDER — PREGABALIN 50 MG/1
50 CAPSULE ORAL 2 TIMES DAILY
Qty: 60 CAPSULE | Refills: 1 | Status: SHIPPED | OUTPATIENT
Start: 2017-06-06 | End: 2017-07-28

## 2017-06-06 ASSESSMENT — PAIN SCALES - GENERAL: PAINLEVEL: WORST PAIN (10)

## 2017-06-06 NOTE — PROGRESS NOTES
[facility]    6/6/2017    Chief complaint: low back and right knee pain    Interval history:  Jialene Breen is a 51 year old male is known to me for   Lumbar spondylosis, pain is worse with extension and rotation indicating a facetogenic component to pain  Lumbar degenerative disc disease  SI joint pain  Ankylosing spondylitis  Myofascial pain  Chronic pain syndrome  PMHx includes: Panic attacks, back pain, arthritis, anxiety, ankylosing spondylitis  PSHx includes: Right knee surgery ×2, left foot surgery right knee arthroscopy        Recommendations/plan at the last visit on 3/31/2017 included:  1. Physical Therapy: Indicated and ordered  2. Clinical Health Psychologist to address issues of relaxation, behavioral change, coping style, and other factors important to improvement: Indicated and ordered  3. Introductory groups: not ordered  4. Diagnostic Studies: none  5. Medication Management:   1. Start Lyrica as directed  2. Start orphenadrine as directed  3. Start these medications at least 3 days apart  4. I might consider a few tablets of oxycodone per month and bad days once the urine drug screen in his back and as expected.  6. Further procedures recommended: schedule lumbar medial branch blocks, our office will contact patient   7. Acupuncture: none  8. Urine toxicology screen today: yes, patient reports he last took Oxycodone over one week ago   9. Recommendations/follow-up for PCP:  See above  10. Release of information: none  11. Follow up: 8 weeks        Since his last visit, Jailene Breen reports:  - knee pain remains, he has a bone contusion per MRI of the left knee. Non-weight bearing. I spoke with ELIDA Vivas of Dr. Levine, he can begin gentle PHYSICAL THERAPY.   - has had recent lumbar RFA with Dr. Ashlyn Gamble on 5/31/2017, will take 3-6 weeks    At this point, the patient's participation with our multidisciplinary team includes:  The patient has been compliant with the  "program.  Pain Group - not ordered  PT - has seen [facility] - seeing Padilla Keene       Pain scores:  Pain intensity on average is 9 on a scale of 0-10.    Range is 5-10/10.   Pain right now is 10/10.  Pain is described as \"throbbing, sharp.\"    Pain is constant in nature    Current pain relevant medications:   -Lyrica 25mg twice per day (unsure if helpful)  -nortriptyline 10-30mg at bedtime (30mg at bedtime, helpful)  -humira 40mg twice monthly(helpful)  -ibuprofen 800mg TID PRN (using 3 times daily-helpful)  -Tylenol 500mg using 1000mg TID (unsure if helpful)  -Maxalt 10mg PRN migraine (helpful for migraine--he does have visual aura)  Oxycodone 5mg may take 2 tabs per day for 7 days, then reduce to 1 tab per day for 7 days, then stop (helpful)         Other pertinent medications:  none    Previous Medications:  OPIATES: Oxycodone (helpful), Fentanyl (100mcg/hr patch helpful), hydrocodone (itching), Tramadol (not helpful)   NSAIDS: ibuprofen (not helpful), Aleve (not helpful)  MUSCLE RELAXANTS: methocarbamol (somewhat helpful), Flexeril (somewhat helpful), tizanidine (unsure if helpful), metaxalone (unsure), SOMA (helpful)  ANTI-MIGRAINE MEDS: Maxalt (helpful for migraine), Imitrex injection (somewhat helpful)  ANTI-DEPRESSANTS: Prozac (helpful), Cymbalta (felt weird on it), nortriptyline (unsure if helpful), Buspar (unsure), Remeron (blacked out)  SLEEP AIDS: Ambien (helpful)  ANTI-CONVULSANTS: gabapentin (felt odd on this medication, unsure of dose), Lyrica (not helpful for 4 months, unsure of dose)  TOPICALS: Lidocaine patches (not helpful), Voltaren gel (not helpful)  Other meds: Tylenol (unsure if helpful)        Other treatments have included:  Jailene Breen has been seen at a pain clinic in the past.  [facility]  PT: tried, not helpful  Chiropractic: tried, not helpful  Acupuncture: none  TENs Unit: tried, helpful     Injections:   -has had injections at outside " clinics  -has had epidural injections for low back pain (one was helpful)  -he has had lumbar medial branch blocks at [facility] and he was going to have lumbar radiofrequency ablation (did not do this due to cost)          Side Effects: no side effect  Patient is using the medication as prescribed: YES    Medications:  Current Outpatient Prescriptions   Medication Sig Dispense Refill     oxyCODONE (ROXICODONE) 5 MG IR tablet Take 1-2 tablets (5-10 mg) by mouth every 4 hours as needed for pain maximum 8 tablet(s) per day 50 tablet 0     nicotine polacrilex (EQ NICOTINE POLACRILEX) 2 MG gum Place 1 each (2 mg) inside cheek as needed for smoking cessation 40 tablet 1     HUMIRA PEN 40 MG/0.8ML pen kit        pregabalin (LYRICA) 25 MG capsule Take 25mg in the morning and afternoon 60 capsule 0     pregabalin (LYRICA) 50 MG capsule Take 1 capsule (50 mg) by mouth every evening 30 capsule 0     nortriptyline (PAMELOR) 10 MG capsule 3 tabs at bedtime. If chronic pain continues, may continue increasing dose 1 additional tab at bedtime every 3 nights until gets to 50 mg and we can switch to 25 mg tabs. 180 capsule 1     orphenadrine (NORFLEX) 100 MG 12 hr tablet Take 1 tablet (100 mg) by mouth 2 times daily as needed for muscle spasms or moderate to severe pain 60 tablet 1     oxyCODONE-acetaminophen (PERCOCET) 5-325 MG per tablet 1 tab BID for 7 days then 1 tab qd 21 tablet 0     nicotine polacrilex (NICORETTE) 2 MG gum Reported on 3/31/2017       methotrexate 2.5 MG tablet 6 tablets Reported on 4/3/2017       adalimumab (HUMIRA) 40 MG/0.8ML prefilled syringe kit Inject 40 mg Subcutaneous Reported on 5/19/2017       IBUPROFEN PO Take 800 mg by mouth every 6 hours as needed for moderate pain Reported on 5/19/2017       clonazePAM (KLONOPIN) 1 MG tablet Take 0.5-1 tablets (0.5-1 mg) by mouth 2 times daily as needed for anxiety (Patient not taking: Reported on 5/31/2017) 20 tablet 0     methocarbamol (ROBAXIN) 750 MG tablet  Take 1 tablet (750 mg) by mouth 3 times daily as needed for muscle spasms (Patient not taking: Reported on 3/31/2017) 60 tablet 1     oxyCODONE (ROXICODONE) 10 MG IR tablet Take 1 tablet (10 mg) by mouth every 8 hours as needed for moderate to severe pain (Patient not taking: Reported on 3/1/2017) 6 tablet 0     busPIRone (BUSPAR) 15 MG tablet O.5 tabs PO BID for 3 days then 0.5 tab qam and 1 tab qhs for 3 days then 1 tab bid for 3 days.  If still sympotmatic,  may increase to 1 tab QAM and 1.5 tabs at bedtime for 3 days, then 1.5 tabs BID for 3 days, then 1.5 tabs q am and 2 tabs QHS for 3 days, then 2 tabs BID. (Patient not taking: Reported on 4/26/2017) 90 tablet 1     cyclobenzaprine (FLEXERIL) 5 MG tablet Take 5 mg by mouth Reported on 5/19/2017       clonazePAM (KLONOPIN) 1 MG tablet Take 0.5-1 tablets (0.5-1 mg) by mouth 2 times daily as needed for anxiety (Patient not taking: Reported on 3/1/2017) 4 tablet 0     oxyCODONE (ROXICODONE) 10 MG IR tablet Take 1 tablet (10 mg) by mouth every 8 hours as needed for moderate to severe pain (Patient not taking: Reported on 4/26/2017) 21 tablet 0     tamsulosin (FLOMAX) 0.4 MG capsule Take 1 capsule (0.4 mg) by mouth daily (Patient not taking: Reported on 2/26/2017) 30 capsule 3     DULoxetine (CYMBALTA) 30 MG EC capsule 1 tab daily for 1 week then 1 tab BID (Patient not taking: Reported on 3/1/2017) 60 capsule 3     oxyCODONE (OXY-IR) 5 MG capsule Take 5 mg by mouth every 6 hours as needed for moderate to severe pain Reported on 4/3/2017       FLUoxetine (PROZAC) 20 MG capsule Take 3 capsules (60 mg) by mouth daily 90 capsule 3     hydrOXYzine (ATARAX) 25 MG tablet Take 1-2 tablets (25-50 mg) by mouth every 6 hours as needed for anxiety (Patient not taking: Reported on 3/31/2017) 90 tablet 3     methylPREDNISolone (MEDROL DOSEPAK) 4 MG tablet Follow package instructions (Patient not taking: Reported on 6/6/2017) 21 tablet 0     folic acid (FOLVITE) 1 MG tablet  Reported on 5/19/2017         Medical History: any changes in medical history since they were last seen? No    Social History:   Home situation: , has 2 children in college  Occupation/Schooling: currently unemployed, worked as a  (unemployed since 3/1/2017)  Tobacco use: none  Alcohol use: none  Drug use: none  History of chemical dependency treatment: none    Review of Systems:  ROS is positive as per HPI as well as for weight gain, joint pain, arthritis, depression, anxiety, stress and is negative for fevers, chills, sweats, or constipation, diarrhea.    Physical Exam:  Vital signs: Blood pressure (!) 151/94, pulse 89.    Behavioral observations:  Awake, alert, cooperative    Gait:  normal    Musculoskeletal exam:    Lumbar flexion to 90 degrees  Lumbar extension to 20 degrees  Lumbar extension and rotation to the right is painful  Lumbar extension and rotation to the left is painful  SI joints are non-tender  GTs are non-tender  Moves well in the exam room  Strength grossly equal throughout    Neuro exam:  SILT in all extremities     Skin/vascular/autonomic:  No suspicious lesions on exposed skin.      Other:  NA      [place] Prescription Monitoring Program:  reveed    DIRE Score for selecting candidates for long term opioid analgesia for chronic pain:  Diagnosis  2  Intractablility  2  Risk    Psychological health  2    Chemical health  2    Reliability  2    Social support  2  Efficacy  2    Total DIRE Score = 14. Note that  7-13 predicts poor outcome (compliance and efficacy) from opioid prescribing; 14-21 predicts good outcome (compliance and efficacy)  from opioid prescribing.      Assessment:   1. Lumbar spondylosis, pain is worse with extension and rotation indicating a facetogenic component to pain  2. Lumbar degenerative disc disease  3. Chronic left knee pain  4. Chronic pain syndrome  5. PMHx includes: Panic attacks, back pain, arthritis, anxiety, ankylosing spondylitis  6. PSHx  includes: Right knee surgery ×2, left foot surgery right knee arthroscopy      Plan:  1. Physical Therapy:  On hold for now  2. Clinical Health Psychologist: The patient will follow up with Padilla Keene as scheduled to address issues of relaxation, behavioral change, coping style, and other factors important to improvement.    3. Diagnostic Studies:  none  4. Medication Management:    1. Increase Lyrica to 25mg in the morning and 50mg at bedtime for 7 days, then 50mg twice daily  2. Refill for oxycodone for post-procedural pain  5. Further procedures recommended: none  6. It will take 6-8 weeks of non-weight bearing to heal the bone contusion in the left knee  7. It will take 3-6 weeks to get improvement from the lumbar RFA  8. Patient referral to [facility] physical therapy for the left knee pain  9. Recommendations to PCP: see above  10. Follow up: 8-10 weeks    Total time spent face to face was 35 minutes and more than 50% of face to face time was spent in counseling and/or coordination of care regarding the diagnosis and recommendations above.      Susana GRANT RN CNP, FNP  [place] [facility]

## 2017-06-06 NOTE — NURSING NOTE
"Chief Complaint   Patient presents with     Pain       Initial There were no vitals taken for this visit. Estimated body mass index is 27.39 kg/(m^2) as calculated from the following:    Height as of 5/19/17: 1.93 m (6' 4\").    Weight as of 5/19/17: 102.1 kg (225 lb).  Medication Reconciliation: nasim Palma CMA (AAMA)      "

## 2017-06-06 NOTE — PATIENT INSTRUCTIONS
PLAN:  1. Hold on pain management PHYSICAL THERAPY for now  2. Continue working with Padilla Keene  3. Medications:   1. Increase Lyrica 25mg in the morning and 50mg at bedtime for 7 days, then take 50mg twice daily and continue  2. Refill of oxycodone for post-procedural pain  4. Procedures: none  5. I will take 6-8 weeks of non-weight-bearing to heal the bone contusion in the left knee  6. It will take 3-6 weeks to get improvement from lumbar RFA  7. Referral to [facility] physical therapy for the left knee pain  8. Follow-up with me in 8-10 weeks.       ----------------------------------------------------------------  Nurse Triage line:  [phone]   Call this number with any questions or concerns. You may leave a detailed message anytime. Calls are typically returned Monday through Friday between 8 AM and 4:30 PM. We usually get back to you within 2 business days depending on the issue/request.       Medication refills:    For non-narcotic medications, call your pharmacy directly to request a refill. The pharmacy will contact the [facility] for authorization. Please allow 3-4 days for these refills to be processed.     For narcotic refills, call the nurse triage line or send a [email or web address] message. Please contact us 7-10 days before your refill is due. The message MUST include the name of the specific medication(s) requested and how you would like to receive the prescription(s). The options are as follows:    Pain Clinic staff can mail the prescription to your pharmacy. Please tell us the name of the pharmacy.    You may pick the prescription up at the Pain Clinic (tell us the location) or during a clinic visit with your pain provider    Pain Clinic staff can deliver the prescription to the [facility] pharmacy in the clinic building. Please tell us the location.      Scheduling number: [phone].  Call this number to schedule or change appointments.    We believe regular attendance is dawson to your success  in our program.    Any time you are unable to keep your appointment we ask that you call us at least 24 hours in advance to let us know. This will allow us to offer the appointment time to another patient.

## 2017-06-06 NOTE — MR AVS SNAPSHOT
After Visit Summary   6/6/2017    Jailene Breen    MRN: [id number]           Patient Information     Date Of Birth          1965        Visit Information        Provider Department      6/6/2017 3:00 PM Susana Pike APRN [facility]        Today's Diagnoses     Lumbosacral spondylosis without myelopathy    -  1    Chronic bilateral low back pain without sciatica        Chronic pain syndrome        Chronic pain of left knee          Care Instructions    PLAN:  1. Hold on pain management PHYSICAL THERAPY for now  2. Continue working with Padilla Keene  3. Medications:   1. Increase Lyrica 25mg in the morning and 50mg at bedtime for 7 days, then take 50mg twice daily and continue  2. Refill of oxycodone for post-procedural pain  4. Procedures: none  5. I will take 6-8 weeks of non-weight-bearing to heal the bone contusion in the left knee  6. It will take 3-6 weeks to get improvement from lumbar RFA  7. Referral to [facility] physical therapy for the left knee pain  8. Follow-up with me in 8-10 weeks.       ----------------------------------------------------------------  Nurse Triage line:  [phone]   Call this number with any questions or concerns. You may leave a detailed message anytime. Calls are typically returned Monday through Friday between 8 AM and 4:30 PM. We usually get back to you within 2 business days depending on the issue/request.       Medication refills:    For non-narcotic medications, call your pharmacy directly to request a refill. The pharmacy will contact the [facility] for authorization. Please allow 3-4 days for these refills to be processed.     For narcotic refills, call the nurse triage line or send a [employer] message. Please contact us 7-10 days before your refill is due. The message MUST include the name of the specific medication(s) requested and how you would like to receive the prescription(s). The options are as  follows:    Pain Clinic staff can mail the prescription to your pharmacy. Please tell us the name of the pharmacy.    You may pick the prescription up at the Pain Clinic (tell us the location) or during a clinic visit with your pain provider    Pain Clinic staff can deliver the prescription to the [facility] pharmacy in the clinic building. Please tell us the location.      Scheduling number: [phone].  Call this number to schedule or change appointments.    We believe regular attendance is key to your success in our program.    Any time you are unable to keep your appointment we ask that you call us at least 24 hours in advance to let us know. This will allow us to offer the appointment time to another patient.               Follow-ups after your visit        Additional Services     [facility] PT, HAND, AND CHIROPRACTIC REFERRAL       **This order will print in the [facility] Scheduling Office**    Physical Therapy, Hand Therapy and Chiropractic Care are available through:    *[facility]  *[facility]  *[facility]    Call one number to schedule at any of the above locations: [phone].    Your provider has referred you to: As Indicated: left knee pain,     Indication/Reason for Referral: Knee Pain  Onset of Illness: 4/15/2017  Therapy Orders: Evaluate and Treat  Special Programs: None  Special Request: None    Nayely Adler      Additional Comments for the Therapist or Chiropractor: recent MRI shows medial femoral condyle bone contusion    Please be aware that coverage of these services is subject to the terms and limitations of your health insurance plan.  Call member services at your health plan with any benefit or coverage questions.      Please bring the following to your appointment:    *Your personal calendar for scheduling future appointments  *Comfortable clothing                  Who to contact     If you have questions or need follow up information about  today's clinic visit or your schedule please contact [facility] directly at [phone].  Normal or non-critical lab and imaging results will be communicated to you by MyChart, letter or phone within 4 business days after the clinic has received the results. If you do not hear from us within 7 days, please contact the clinic through [employer] or phone. If you have a critical or abnormal lab result, we will notify you by phone as soon as possible.  Submit refill requests through Rep or call your pharmacy and they will forward the refill request to us. Please allow 3 business days for your refill to be completed.          Additional Information About Your Visit        [employer] Information     Rep gives you secure access to your electronic health record. If you see a primary care provider, you can also send messages to your care team and make appointments. If you have questions, please call your primary care clinic.  If you do not have a primary care provider, please call [phone] and they will assist you.        Care EveryWhere ID     This is your Care EveryWhere ID. This could be used by other organizations to access your [facility] medical records  [id number]        Your Vitals Were     Pulse                   89            Blood Pressure from Last 3 Encounters:   06/06/17 (!) 151/94   05/31/17 (!) 131/91   05/17/17 (!) 148/94    Weight from Last 3 Encounters:   05/19/17 102.1 kg (225 lb)   05/17/17 103.2 kg (227 lb 9.6 oz)   03/31/17 98.9 kg (218 lb)              We Performed the Following     [facility] PT, HAND, AND CHIROPRACTIC REFERRAL          Today's Medication Changes          These changes are accurate as of: 6/6/17  3:58 PM.  If you have any questions, ask your nurse or doctor.               These medicines have changed or have updated prescriptions.        Dose/Directions    oxyCODONE-acetaminophen 5-325 MG per tablet   Commonly known as:  PERCOCET   This may have changed:  additional  instructions   Used for:  Chronic pain syndrome        1 tab BID for 7 days then 1 tab qd for post-procedural pain   Quantity:  21 tablet   Refills:  0       * pregabalin 25 MG capsule   Commonly known as:  LYRICA   This may have changed:  Another medication with the same name was added. Make sure you understand how and when to take each.   Used for:  Lumbosacral spondylosis without myelopathy, DDD (degenerative disc disease), lumbar, Sacroiliac joint pain, Ankylosing spondylitis (H), Myofascial pain        Take 25mg in the morning and afternoon   Quantity:  60 capsule   Refills:  0       * pregabalin 50 MG capsule   Commonly known as:  LYRICA   This may have changed:  Another medication with the same name was added. Make sure you understand how and when to take each.   Used for:  Lumbosacral spondylosis without myelopathy, DDD (degenerative disc disease), lumbar, Sacroiliac joint pain, Ankylosing spondylitis (H), Myofascial pain        Dose:  50 mg   Take 1 capsule (50 mg) by mouth every evening   Quantity:  30 capsule   Refills:  0       * pregabalin 50 MG capsule   Commonly known as:  LYRICA   This may have changed:  You were already taking a medication with the same name, and this prescription was added. Make sure you understand how and when to take each.   Used for:  Chronic pain of left knee, Chronic pain syndrome, Chronic bilateral low back pain without sciatica, Lumbosacral spondylosis without myelopathy        Dose:  50 mg   Take 1 capsule (50 mg) by mouth 2 times daily   Quantity:  60 capsule   Refills:  1       * Notice:  This list has 3 medication(s) that are the same as other medications prescribed for you. Read the directions carefully, and ask your doctor or other care provider to review them with you.         Where to get your medicines      Some of these will need a paper prescription and others can be bought over the counter.  Ask your nurse if you have questions.     Bring a paper prescription for  each of these medications     oxyCODONE-acetaminophen 5-325 MG per tablet    pregabalin 50 MG capsule                Primary Care Provider Office Phone # Fax #    JASON Rice [phone]        [facility] [place] [id number] [facility] [address]        Thank you!     Thank you for choosing [facility]  for your care. Our goal is always to provide you with excellent care. Hearing back from our patients is one way we can continue to improve our services. Please take a few minutes to complete the written survey that you may receive in the mail after your visit with us. Thank you!             Your Updated Medication List - Protect others around you: Learn how to safely use, store and throw away your medicines at www.disposemymeds.org.          This list is accurate as of: 6/6/17  3:58 PM.  Always use your most recent med list.                   Brand Name Dispense Instructions for use    busPIRone 15 MG tablet    BUSPAR    90 tablet    O.5 tabs PO BID for 3 days then 0.5 tab qam and 1 tab qhs for 3 days then 1 tab bid for 3 days.  If still sympotmatic,  may increase to 1 tab QAM and 1.5 tabs at bedtime for 3 days, then 1.5 tabs BID for 3 days, then 1.5 tabs q am and 2 tabs QHS for 3 days, then 2 tabs BID.       * clonazePAM 1 MG tablet    klonoPIN    4 tablet    Take 0.5-1 tablets (0.5-1 mg) by mouth 2 times daily as needed for anxiety       * clonazePAM 1 MG tablet    klonoPIN    20 tablet    Take 0.5-1 tablets (0.5-1 mg) by mouth 2 times daily as needed for anxiety       cyclobenzaprine 5 MG tablet    FLEXERIL     Take 5 mg by mouth Reported on 5/19/2017       DULoxetine 30 MG EC capsule    CYMBALTA    60 capsule    1 tab daily for 1 week then 1 tab BID       FLUoxetine 20 MG capsule    PROzac    90 capsule    Take 3 capsules (60 mg) by mouth daily       folic acid 1 MG tablet    FOLVITE     Reported on 5/19/2017       * HUMIRA 40 MG/0.8ML prefilled syringe kit    Generic drug:  adalimumab      Inject 40 mg Subcutaneous Reported on 5/19/2017       * HUMIRA PEN 40 MG/0.8ML pen kit   Generic drug:  adalimumab          hydrOXYzine 25 MG tablet    ATARAX    90 tablet    Take 1-2 tablets (25-50 mg) by mouth every 6 hours as needed for anxiety       IBUPROFEN PO      Take 800 mg by mouth every 6 hours as needed for moderate pain Reported on 5/19/2017       methocarbamol 750 MG tablet    ROBAXIN    60 tablet    Take 1 tablet (750 mg) by mouth 3 times daily as needed for muscle spasms       methotrexate 2.5 MG tablet CHEMO      6 tablets Reported on 4/3/2017       methylPREDNISolone 4 MG tablet    MEDROL DOSEPAK    21 tablet    Follow package instructions       * nicotine polacrilex 2 MG gum    NICORETTE     Reported on 3/31/2017       * nicotine polacrilex 2 MG gum    EQ NICOTINE POLACRILEX    40 tablet    Place 1 each (2 mg) inside cheek as needed for smoking cessation       nortriptyline 10 MG capsule    PAMELOR    180 capsule    3 tabs at bedtime. If chronic pain continues, may continue increasing dose 1 additional tab at bedtime every 3 nights until gets to 50 mg and we can switch to 25 mg tabs.       orphenadrine 100 MG 12 hr tablet    NORFLEX    60 tablet    Take 1 tablet (100 mg) by mouth 2 times daily as needed for muscle spasms or moderate to severe pain       * oxyCODONE 5 MG capsule    OXY-IR     Take 5 mg by mouth every 6 hours as needed for moderate to severe pain Reported on 4/3/2017       * oxyCODONE 10 MG IR tablet    ROXICODONE    21 tablet    Take 1 tablet (10 mg) by mouth every 8 hours as needed for moderate to severe pain       * oxyCODONE 10 MG IR tablet    ROXICODONE    6 tablet    Take 1 tablet (10 mg) by mouth every 8 hours as needed for moderate to severe pain       * oxyCODONE 5 MG IR tablet    ROXICODONE    50 tablet    Take 1-2 tablets (5-10 mg) by mouth every 4 hours as needed for pain maximum 8 tablet(s) per day       oxyCODONE-acetaminophen 5-325 MG  per tablet    PERCOCET    21 tablet    1 tab BID for 7 days then 1 tab qd for post-procedural pain       * pregabalin 25 MG capsule    LYRICA    60 capsule    Take 25mg in the morning and afternoon       * pregabalin 50 MG capsule    LYRICA    30 capsule    Take 1 capsule (50 mg) by mouth every evening       * pregabalin 50 MG capsule    LYRICA    60 capsule    Take 1 capsule (50 mg) by mouth 2 times daily       tamsulosin 0.4 MG capsule    FLOMAX    30 capsule    Take 1 capsule (0.4 mg) by mouth daily       * Notice:  This list has 13 medication(s) that are the same as other medications prescribed for you. Read the directions carefully, and ask your doctor or other care provider to review them with you.

## 2017-06-08 ENCOUNTER — MYC REFILL (OUTPATIENT)
Dept: FAMILY MEDICINE | Facility: CLINIC | Age: 52
End: 2017-06-08

## 2017-06-08 DIAGNOSIS — F17.200 TOBACCO USE DISORDER: ICD-10-CM

## 2017-06-09 NOTE — TELEPHONE ENCOUNTER
Message from [employer]:  Original authorizing provider: JASON Rice would like a refill of the following medications:  nicotine polacrilex (EQ NICOTINE POLACRILEX) 2 MG gum [JASON Rice]    Preferred pharmacy: [id number] [address] [facility]    Comment:  Please can you send a refill to [facility] pharmacy in [facility].

## 2017-06-15 ENCOUNTER — MYC MEDICAL ADVICE (OUTPATIENT)
Dept: FAMILY MEDICINE | Facility: CLINIC | Age: 52
End: 2017-06-15

## 2017-06-15 DIAGNOSIS — F41.9 ANXIETY: ICD-10-CM

## 2017-06-15 DIAGNOSIS — F41.0 PANIC ATTACK: Primary | ICD-10-CM

## 2017-06-16 ENCOUNTER — OFFICE VISIT (OUTPATIENT)
Dept: PALLIATIVE MEDICINE | Facility: CLINIC | Age: 52
End: 2017-06-16
Payer: COMMERCIAL

## 2017-06-16 DIAGNOSIS — M45.9 ANKYLOSING SPONDYLITIS (H): ICD-10-CM

## 2017-06-16 DIAGNOSIS — M47.817 LUMBOSACRAL SPONDYLOSIS WITHOUT MYELOPATHY: ICD-10-CM

## 2017-06-16 DIAGNOSIS — G89.4 CHRONIC PAIN SYNDROME: Primary | ICD-10-CM

## 2017-06-16 DIAGNOSIS — M06.9 RHEUMATOID ARTHRITIS, INVOLVING UNSPECIFIED SITE, UNSPECIFIED RHEUMATOID FACTOR PRESENCE: ICD-10-CM

## 2017-06-16 PROCEDURE — 96152 HC HEALTH AND BEHAVIOR INTERVENTION, INDIVIDUAL, EACH 15 MINUTES: CPT | Performed by: PSYCHOLOGIST

## 2017-06-16 RX ORDER — CLONAZEPAM 1 MG/1
0.5-1 TABLET ORAL 2 TIMES DAILY PRN
Qty: 15 TABLET | Refills: 0 | Status: SHIPPED | OUTPATIENT
Start: 2017-06-16 | End: 2017-07-28

## 2017-06-16 NOTE — TELEPHONE ENCOUNTER
Patient contacted and scheduled for an appointment. Can not come in until next Tues 6/20/17 at 9am with Katarina.    Patient requesting if he can get medication for few days until see doctor in office. Please advise Katarina.  Rio Shirley CMA

## 2017-06-16 NOTE — MR AVS SNAPSHOT
After Visit Summary   6/16/2017    Jailene Breen    MRN: [id number]           Patient Information     Date Of Birth          1965        Visit Information        Provider Department      6/16/2017 8:00 AM Padilla Keene LP [facility]        Today's Diagnoses     Chronic pain syndrome    -  1    Rheumatoid arthritis, involving unspecified site, unspecified rheumatoid factor presence (H)        Lumbosacral spondylosis without myelopathy        Ankylosing spondylitis (H)           Follow-ups after your visit        Your next 10 appointments already scheduled     Jun 27, 2017  7:00 AM CDT   ARANZA Extremity with Armando Tapia, PT   [facility] [place] [facility] ([facility] [place])    [address]  [place] [address]   [phone]            Jun 28, 2017  9:00 AM CDT   Return Visit with Padilla Keene LP   [facility] ([facility] [place])    [address] [address]   [phone]            Aug 08, 2017  3:30 PM CDT   Return Visit with JUANITA [facility] ([facility])    [address] [address]   [phone]              Who to contact     If you have questions or need follow up information about today's clinic visit or your schedule please contact [facility] directly at [phone].  Normal or non-critical lab and imaging results will be communicated to you by MyChart, letter or phone within 4 business days after the clinic has received the results. If you do not hear from us within 7 days, please contact the clinic through MyChart or phone. If you have a critical or abnormal lab result, we will notify you by phone as soon as possible.  Submit refill requests through [employer] or call your pharmacy and they will forward the refill request to us. Please allow 3 business days for your refill to  be completed.          Additional Information About Your Visit        [employer] Information     [employer] gives you secure access to your electronic health record. If you see a primary care provider, you can also send messages to your care team and make appointments. If you have questions, please call your primary care clinic.  If you do not have a primary care provider, please call [phone] and they will assist you.        Care EveryWhere ID     This is your Care EveryWhere ID. This could be used by other organizations to access your [facility] medical records  [id number]         Blood Pressure from Last 3 Encounters:   06/06/17 (!) 151/94   05/31/17 (!) 131/91   05/17/17 (!) 148/94    Weight from Last 3 Encounters:   05/19/17 102.1 kg (225 lb)   05/17/17 103.2 kg (227 lb 9.6 oz)   03/31/17 98.9 kg (218 lb)              We Performed the Following     HEALTH & BEHAVIOR ASSESS, INITIAL, EA 15MIN PHD        Primary Care Provider Office Phone # Fax #    JASON Rice [phone]        [facility] [address] [address]        Equal Access to Services     [facility] TEDDY : Hadii aad ku hadasho Soomaali, waaxda luqadaha, qaybta kaalmada adeegyada, waxay dulcein haytariqn adeeg trevor lachin ah. So [facility] [place] [phone].    ATENCIÓN: Si habla español, tiene a carrington disposición servicios gratuitos de asistencia lingüística. Nathaniel al [phone].    We comply with applicable federal civil rights laws and [place] laws. We do not discriminate on the basis of race, color, national origin, age, disability sex, sexual orientation or gender identity.            Thank you!     [facility]. Our goal is always to provide you with excellent care. Hearing back from our patients is one way we can continue to improve our services. Please take a few minutes to complete the written survey that you may receive in the mail after your visit with  us. Thank you!             Your Updated Medication List - Protect others around you: Learn how to safely use, store and throw away your medicines at www.disposemymeds.org.          This list is accurate as of: 6/16/17 11:59 PM.  Always use your most recent med list.                   Brand Name Dispense Instructions for use Diagnosis    busPIRone 15 MG tablet    BUSPAR    90 tablet    O.5 tabs PO BID for 3 days then 0.5 tab qam and 1 tab qhs for 3 days then 1 tab bid for 3 days.  If still sympotmatic,  may increase to 1 tab QAM and 1.5 tabs at bedtime for 3 days, then 1.5 tabs BID for 3 days, then 1.5 tabs q am and 2 tabs QHS for 3 days, then 2 tabs BID.    Anxiety       * clonazePAM 1 MG tablet    klonoPIN    4 tablet    Take 0.5-1 tablets (0.5-1 mg) by mouth 2 times daily as needed for anxiety    Panic attack       * clonazePAM 1 MG tablet    klonoPIN    15 tablet    Take 0.5-1 tablets (0.5-1 mg) by mouth 2 times daily as needed for anxiety    Anxiety       cyclobenzaprine 5 MG tablet    FLEXERIL     Take 5 mg by mouth Reported on 5/19/2017        DULoxetine 30 MG EC capsule    CYMBALTA    60 capsule    1 tab daily for 1 week then 1 tab BID    Chronic back pain, unspecified back location, unspecified back pain laterality       folic acid 1 MG tablet    FOLVITE     Reported on 5/19/2017        * HUMIRA 40 MG/0.8ML prefilled syringe kit   Generic drug:  adalimumab      Inject 40 mg Subcutaneous Reported on 5/19/2017        * HUMIRA PEN 40 MG/0.8ML pen kit   Generic drug:  adalimumab           hydrOXYzine 25 MG tablet    ATARAX    90 tablet    Take 1-2 tablets (25-50 mg) by mouth every 6 hours as needed for anxiety    Panic attack       IBUPROFEN PO      Take 800 mg by mouth every 6 hours as needed for moderate pain Reported on 5/19/2017        methocarbamol 750 MG tablet    ROBAXIN    60 tablet    Take 1 tablet (750 mg) by mouth 3 times daily as needed for muscle spasms    Muscle ache       methotrexate 2.5 MG  tablet CHEMO      6 tablets Reported on 4/3/2017        methylPREDNISolone 4 MG tablet    MEDROL DOSEPAK    21 tablet    Follow package instructions        nicotine polacrilex 2 MG gum    EQ NICOTINE POLACRILEX    40 tablet    Place 1 each (2 mg) inside cheek as needed for smoking cessation    Tobacco use disorder       nortriptyline 10 MG capsule    PAMELOR    180 capsule    3 tabs at bedtime. If chronic pain continues, may continue increasing dose 1 additional tab at bedtime every 3 nights until gets to 50 mg and we can switch to 25 mg tabs.    Anxiety, Other chronic pain       orphenadrine 100 MG 12 hr tablet    NORFLEX    60 tablet    Take 1 tablet (100 mg) by mouth 2 times daily as needed for muscle spasms or moderate to severe pain    Lumbosacral spondylosis without myelopathy, DDD (degenerative disc disease), lumbar, Sacroiliac joint pain, Ankylosing spondylitis (H), Myofascial pain       * oxyCODONE 5 MG capsule    OXY-IR     Take 5 mg by mouth every 6 hours as needed for moderate to severe pain Reported on 4/3/2017        * oxyCODONE 10 MG IR tablet    ROXICODONE    21 tablet    Take 1 tablet (10 mg) by mouth every 8 hours as needed for moderate to severe pain    Chronic pain syndrome       * oxyCODONE 10 MG IR tablet    ROXICODONE    6 tablet    Take 1 tablet (10 mg) by mouth every 8 hours as needed for moderate to severe pain    Chronic back pain, unspecified back location, unspecified back pain laterality       * oxyCODONE 5 MG IR tablet    ROXICODONE    50 tablet    Take 1-2 tablets (5-10 mg) by mouth every 4 hours as needed for pain maximum 8 tablet(s) per day    Facet arthropathy (H)       oxyCODONE-acetaminophen 5-325 MG per tablet    PERCOCET    21 tablet    1 tab BID for 7 days then 1 tab qd for post-procedural pain    Chronic pain syndrome       * pregabalin 25 MG capsule    LYRICA    60 capsule    Take 25mg in the morning and afternoon    Lumbosacral spondylosis without myelopathy, DDD  (degenerative disc disease), lumbar, Sacroiliac joint pain, Ankylosing spondylitis (H), Myofascial pain       * pregabalin 50 MG capsule    LYRICA    30 capsule    Take 1 capsule (50 mg) by mouth every evening    Lumbosacral spondylosis without myelopathy, DDD (degenerative disc disease), lumbar, Sacroiliac joint pain, Ankylosing spondylitis (H), Myofascial pain       * pregabalin 50 MG capsule    LYRICA    60 capsule    Take 1 capsule (50 mg) by mouth 2 times daily    Chronic pain of left knee, Chronic pain syndrome, Chronic bilateral low back pain without sciatica, Lumbosacral spondylosis without myelopathy       tamsulosin 0.4 MG capsule    FLOMAX    30 capsule    Take 1 capsule (0.4 mg) by mouth daily    Lower urinary tract symptoms (LUTS)       * Notice:  This list has 11 medication(s) that are the same as other medications prescribed for you. Read the directions carefully, and ask your doctor or other care provider to review them with you.

## 2017-06-16 NOTE — TELEPHONE ENCOUNTER
Per  [facility], patient requesting his prescription Clonazepam to be sent to [facility] instead.  Verbal phoned into [facility] pharmacist.    Rio Shirley CMA

## 2017-06-19 ENCOUNTER — MYC MEDICAL ADVICE (OUTPATIENT)
Dept: PALLIATIVE MEDICINE | Facility: CLINIC | Age: 52
End: 2017-06-19

## 2017-06-19 ENCOUNTER — TELEPHONE (OUTPATIENT)
Dept: FAMILY MEDICINE | Facility: CLINIC | Age: 52
End: 2017-06-19

## 2017-06-19 DIAGNOSIS — M47.817 LUMBOSACRAL SPONDYLOSIS WITHOUT MYELOPATHY: ICD-10-CM

## 2017-06-19 DIAGNOSIS — F17.200 TOBACCO USE DISORDER: Primary | ICD-10-CM

## 2017-06-19 DIAGNOSIS — G89.4 CHRONIC PAIN SYNDROME: ICD-10-CM

## 2017-06-19 DIAGNOSIS — M51.369 DDD (DEGENERATIVE DISC DISEASE), LUMBAR: ICD-10-CM

## 2017-06-19 NOTE — TELEPHONE ENCOUNTER
[facility] pharmacy is faxing request for a new Rx for nicotine polacrilex (NICORETTE) 2 MG gum with more specific instructions.    What is the max daily does?    Thank you

## 2017-06-20 NOTE — TELEPHONE ENCOUNTER
Routed to the nursing pool and to the MA pool to process refill(s).    Cha Molina, RN-BSN  [facility]

## 2017-06-21 NOTE — PROGRESS NOTES
[facility]   [facility]  Behavioral Medicine Visit    Patient Name: Jailene Breen    YOB: 1965   Medical Record Number: [id number]  Date: 6/21/2017                SUBJECTIVE:  Session objective: Met with Jailene Breen on this date for an elective appointment. This is his fourth visit post intake. Jamison reports today: His level of pain is moderately worse, his mood is moderately worse, His activity level has moderately decreased, his stress level is moderately worse, his sleep has been mildly worse. Jamison reports that he had his RFA done on June 6 and that he has felt some relief from that. He reports that he has a contusion on his left knee from his prior running and injury he created by running when he was supposed to be resting. He is discouraged about not running at present and attributes that to much of his increase in pain etc.    Additionally Jamison reports stressors associated with his lack of employment,  mounting bills and pending loss of unemployment resources. 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 He did not seem overly optimistic about the possibility however.    Jamison very easily falls into a lengthy list of various problem areas in his life during that time he typically escalates in anxiety appearance. When he reminds himself he is able to change subject and slow his frequency of complaints.      OBJECTIVE:   Length of Visit: 60 minutes      Assessment: Current Emotional / Mental Status    Appearance:   Appropriate   Eye Contact:   Good   Psychomotor Behavior: Normal   Attitude:   Cooperative   Orientation:   All  Speech   Rate / Production: Normal    Volume:  Normal   Mood:    Anxious  Depressed  Sad   Affect:    Appropriate   Thought Content:  Clear   Thought Form:  Coherent  Logical   Insight:    Poor     ASSESSMENT:   Axis I: Chronic  pain syndrome, rheumatoid arthritis, involving unspecified site, unspecified rheumatoid factor presence, no sacral spondylosis without myelopathy, ankylosing spondylitis   Axis II: Dx deferred    Progress toward goals: fair.   Jamison's ability at breaking problems into smaller parts so he can effectively solve them needs to improve. He is prone to vagueness and generalization when setting goals. He appears to have difficulty staying focused on activities that might help him with his various concerns instead turning to thoughts and feelings associated with his failures.   Pain Status: worsened    Emotional Status: worsened   Medication / chemical use concerns: None    PLAN:   Next Appointment: Jailene Breen will schedule a follow-up appointment in 3 weeks.  Assignment: None established  Objectives / interventions for next session: Continue to work on goalsetting and plan implementation    Padilla Keene MA LP, [facility]  Pain Specialist  [facility] [place]

## 2017-06-21 NOTE — TELEPHONE ENCOUNTER
Received call from patient requesting refill(s) of oxyCODONE (ROXICODONE) 5 MG IR tablet    Last picked up from pharmacy on 05/31/17    Pt last seen by prescribing provider on 06/06/17  Next appt scheduled for 08/08/17    Last urine drug screen date 03/31/17  Current opioid agreement on file (completed within the last year) Yes Date of opioid agreement: 04/06/17    Processing (pick one and delete the others):        Deliver Rx to [facility], [place]    Will route to nursing pool for review and preparation of prescription(s).

## 2017-06-22 NOTE — TELEPHONE ENCOUNTER
Pt had an radiofrequency ablation on 5/31/17.  Was prescribed oxycodone at discharge then on 6/6/17 Susana Pike NP prescribed a 7day supply of percocet for him.  Before the radiofrequency ablation Susana had been prescribing #15 oxycodone/month.    Medication refill information reviewed.     Last due:  6/6/17 for a 7 day supply    Due date:  Anytime    Weaning instructions:  N/A    Prescriptions prepped for review.     Cha Molina RN-BSN  [facility]

## 2017-06-23 RX ORDER — OXYCODONE HYDROCHLORIDE 5 MG/1
TABLET ORAL
Qty: 15 TABLET | Refills: 0 | Status: SHIPPED | OUTPATIENT
Start: 2017-06-23 | End: 2017-07-17

## 2017-06-23 NOTE — TELEPHONE ENCOUNTER
Signed Prescriptions:                        Disp   Refills    oxyCODONE (ROXICODONE) 5 MG IR tablet      15 tab*0        Sig: May take one tablet every 6 hours max of 3 tabs per           day and will allow #15 tablets per month. Must           last 30 days. OK to fill and begin on 6/23/2017  Authorizing Provider: SUSANA PETTY    Signed script placed in touchdown bin at [facility].    Susana Petty APRN CNP FNP RN  [facility]

## 2017-06-27 ENCOUNTER — THERAPY VISIT (OUTPATIENT)
Dept: PHYSICAL THERAPY | Facility: CLINIC | Age: 52
End: 2017-06-27
Payer: COMMERCIAL

## 2017-06-27 DIAGNOSIS — M25.562 ACUTE PAIN OF LEFT KNEE: Primary | ICD-10-CM

## 2017-06-27 PROCEDURE — 97162 PT EVAL MOD COMPLEX 30 MIN: CPT | Mod: GP | Performed by: PHYSICAL THERAPIST

## 2017-06-27 PROCEDURE — 97110 THERAPEUTIC EXERCISES: CPT | Mod: GP | Performed by: PHYSICAL THERAPIST

## 2017-06-27 ASSESSMENT — ACTIVITIES OF DAILY LIVING (ADL)
KNEE_ACTIVITY_OF_DAILY_LIVING_SCORE: 58.57
AS_A_RESULT_OF_YOUR_KNEE_INJURY,_HOW_WOULD_YOU_RATE_YOUR_CURRENT_LEVEL_OF_DAILY_ACTIVITY?: ABNORMAL
KNEE_ACTIVITY_OF_DAILY_LIVING_SUM: 41
HOW_WOULD_YOU_RATE_THE_CURRENT_FUNCTION_OF_YOUR_KNEE_DURING_YOUR_USUAL_DAILY_ACTIVITIES_ON_A_SCALE_FROM_0_TO_100_WITH_100_BEING_YOUR_LEVEL_OF_KNEE_FUNCTION_PRIOR_TO_YOUR_INJURY_AND_0_BEING_THE_INABILITY_TO_PERFORM_ANY_OF_YOUR_USUAL_DAILY_ACTIVITIES?: 50
WEAKNESS: I HAVE THE SYMPTOM BUT IT DOES NOT AFFECT MY ACTIVITY
HOW_WOULD_YOU_RATE_THE_OVERALL_FUNCTION_OF_YOUR_KNEE_DURING_YOUR_USUAL_DAILY_ACTIVITIES?: ABNORMAL
LIMPING: I HAVE THE SYMPTOM BUT IT DOES NOT AFFECT MY ACTIVITY
KNEEL ON THE FRONT OF YOUR KNEE: ACTIVITY IS VERY DIFFICULT
GO UP STAIRS: ACTIVITY IS VERY DIFFICULT
RAW_SCORE: 41
GIVING WAY, BUCKLING OR SHIFTING OF KNEE: I HAVE THE SYMPTOM BUT IT DOES NOT AFFECT MY ACTIVITY
STIFFNESS: I DO NOT HAVE THE SYMPTOM
RISE FROM A CHAIR: ACTIVITY IS VERY DIFFICULT
STAND: ACTIVITY IS MINIMALLY DIFFICULT
SWELLING: I DO NOT HAVE THE SYMPTOM
SQUAT: ACTIVITY IS VERY DIFFICULT
WALK: ACTIVITY IS MINIMALLY DIFFICULT
SIT WITH YOUR KNEE BENT: ACTIVITY IS MINIMALLY DIFFICULT
GO DOWN STAIRS: ACTIVITY IS FAIRLY DIFFICULT
PAIN: THE SYMPTOM AFFECTS MY ACTIVITY SEVERELY

## 2017-06-27 NOTE — PROGRESS NOTES
[facility] Initial Evaluation    Subjective:    Patient is a 51 year old male presenting with rehab left knee hpi. The history is provided by the patient. No  was used.   Jailene Breen is a 51 year old male with a left knee condition.  Condition occurred with:  Running.  Condition occurred: during recreation/sport.  This is a new condition  4/15/17- was on an 8 mile run and started feeling some pain at mile 5 but he was able to finish the run.  When he was done it got really bad.  MRI showed bone contusion so he was told to go NWB for 2-3 weeks at the end of May.  States he did 2 weeks on crutches.    Patient reports pain:  Anterior.  Radiates to:  Lower leg.  Pain is described as sharp and is intermittent and reported as 6/10.  Associated symptoms:  Loss of strength. Pain is the same all the time.  Symptoms are exacerbated by ascending stairs, descending stairs, kneeling and bending/squatting and relieved by rest and ice.  Since onset symptoms are unchanged.  Special tests:  MRI.      General health as reported by patient is good.  Pertinent medical history includes:  Rheumatoid arthritis and depression.  Medical allergies: yes (see Epic).  Other surgeries include:  Orthopedic surgery (R knee).  Current medications:  Pain medication, anti-depressants and muscle relaxants.  Current occupation is Not currently employed but was working as a .    Primary job tasks include:  Prolonged sitting (ladders).    Barriers include:  None as reported by the patient.    Red flags:  None as reported by the patient.                        Objective:    System                                                Knee Evaluation:  ROM:  AROM: normal            Strength:     Extension:  Left: 4-/5    Pain:+      Right: 5/5    Pain:-  Flexion:  Left: 5/5    Pain:-      Right:/5   Pain:-        Ligament Testing:    Varus 0:  Left:  Neg   Right:  Neg  Varus 30:  Left:  Pos  Right:  Neg  Valgus  0:  Left:  Neg  Right:  Neg  Valgus 30:  Left:  Neg    Right:  Neg        Special Tests:   Left knee positive for the following special tests:  IT Band Friction  Left knee negative for the following special tests:  Meninscal    Palpation:    Left knee tenderness present at:  Lateral Joint Line and IT Band (distal)  Left knee tenderness not present at:  Medial Joint Line; Patellar Tendon; Patellar Medial and Patellar Lateral        Functional Testing:          Quad:    Single Leg Squat:  Left:      Right:        Bilateral Leg Squat:  15-20 deg- stopped due to significant L knee pain               General     ROS    Assessment/Plan:      Patient is a 51 year old male with left knee complaints.    Patient has the following significant findings with corresponding treatment plan.                Diagnosis 1:  Knee pain s/p contusion with possible distal ITB irritation  Pain -  self management, education and home program  Decreased strength - therapeutic exercise, therapeutic activities and home program  Impaired muscle performance - neuro re-education and home program  Decreased function - therapeutic activities and home program    Therapy Evaluation Codes:   1) History comprised of:   Personal factors that impact the plan of care:      Anxiety and Coping style.    Comorbidity factors that impact the plan of care are:      Depression.     Medications impacting care: Pain.  2) Examination of Body Systems comprised of:   Body structures and functions that impact the plan of care:      Knee.   Activity limitations that impact the plan of care are:      Running, Squatting/kneeling and Stairs.  3) Clinical presentation characteristics are:   Evolving/Changing.  4) Decision-Making    Moderate complexity using standardized patient assessment instrument and/or measureable assessment of functional outcome.  Cumulative Therapy Evaluation is: Moderate complexity.    Previous and current functional limitations:  (See Goal Flow Sheet  for this information)    Short term and Long term goals: (See Goal Flow Sheet for this information)     Communication ability:  Patient appears to be able to clearly communicate and understand verbal and written communication and follow directions correctly.  Treatment Explanation - The following has been discussed with the patient:   RX ordered/plan of care  Anticipated outcomes  Possible risks and side effects  This patient would benefit from PT intervention to resume normal activities.   Rehab potential is good.    Frequency:  2 X a month, once daily  Duration:  for 2 months  Discharge Plan:  Achieve all LTG.  Independent in home treatment program.  Reach maximal therapeutic benefit.    Please refer to the daily flowsheet for treatment today, total treatment time and time spent performing 1:1 timed codes.

## 2017-06-27 NOTE — MR AVS SNAPSHOT
After Visit Summary   6/27/2017    Jailene Breen    MRN: [id number]           Patient Information     Date Of Birth          1965        Visit Information        Provider Department      6/27/2017 7:00 AM Armando Tapia, PT [facility] [place] [facility]        Today's Diagnoses     Acute pain of left knee    -  1       Follow-ups after your visit        Your next 10 appointments already scheduled     Jun 28, 2017  9:00 AM CDT   Return Visit with Padilla Keene LP   [facility] ([facility])    [address]  [place] [address]   [phone]            Jul 11, 2017  7:40 AM CDT   ARANZA Extremity with Armando Tapia PT   [facility] [place] [facility] ([facility] [place])    [address]  [address]  [place] [address]   [phone]            Aug 08, 2017  3:30 PM CDT   Return Visit with JUANITA Ga [facility] ([facility])    [address]  [place] [address]   [phone]              Who to contact     If you have questions or need follow up information about today's clinic visit or your schedule please contact [facility] [place] [facility] directly at [phone].  Normal or non-critical lab and imaging results will be communicated to you by MyChart, letter or phone within 4 business days after the clinic has received the results. If you do not hear from us within 7 days, please contact the clinic through MyChart or phone. If you have a critical or abnormal lab result, we will notify you by phone as soon as possible.  Submit refill requests through [employer] or call your pharmacy and they will forward the refill request to us. Please allow 3 business days for your refill to be completed.          Additional Information About Your Visit        [employer] [facility] Information     [employer] gives you secure access to your electronic  health record. If you see a primary care provider, you can also send messages to your care team and make appointments. If you have questions, please call your primary care clinic.  If you do not have a primary care provider, please call [phone] and they will assist you.        Care EveryWhere ID     This is your Care EveryWhere ID. This could be used by other organizations to access your [place] medical records  [id number]         Blood Pressure from Last 3 Encounters:   06/06/17 (!) 151/94   05/31/17 (!) 131/91   05/17/17 (!) 148/94    Weight from Last 3 Encounters:   05/19/17 102.1 kg (225 lb)   05/17/17 103.2 kg (227 lb 9.6 oz)   03/31/17 98.9 kg (218 lb)              We Performed the Following     HC PT EVAL, MODERATE COMPLEXITY     THERAPEUTIC EXERCISES        Primary Care Provider Office Phone # Fax #    Reinaldomanuel JASON Meyer [phone]        URGENT CARE [place] [address] [place] [address]        Equal Access to Services     [facility]: Hadii aad ku hadasho Soomaali, waaxda luqadaha, qaybta kaalmada adeegyada, waxbeny davis . So [facility] [phone].    ATENCIÓN: Si habla español, tiene a carrington disposición servicios gratuitos de asistencia lingüística. [facility] [phone].    We comply with applicable federal civil rights laws and [place] laws. We do not discriminate on the basis of race, color, national origin, age, disability sex, sexual orientation or gender identity.            Thank you!     Thank you for choosing [facility]  for your care. Our goal is always to provide you with excellent care. Hearing back from our patients is one way we can continue to improve our services. Please take a few minutes to complete the written survey that you may receive in the mail after your visit with us. Thank you!             Your Updated Medication List - Protect others around you: Learn how to safely use, store and  throw away your medicines at www.disposemymeds.org.          This list is accurate as of: 6/27/17  9:16 AM.  Always use your most recent med list.                   Brand Name Dispense Instructions for use Diagnosis    busPIRone 15 MG tablet    BUSPAR    90 tablet    O.5 tabs PO BID for 3 days then 0.5 tab qam and 1 tab qhs for 3 days then 1 tab bid for 3 days.  If still sympotmatic,  may increase to 1 tab QAM and 1.5 tabs at bedtime for 3 days, then 1.5 tabs BID for 3 days, then 1.5 tabs q am and 2 tabs QHS for 3 days, then 2 tabs BID.    Anxiety       * clonazePAM 1 MG tablet    klonoPIN    4 tablet    Take 0.5-1 tablets (0.5-1 mg) by mouth 2 times daily as needed for anxiety    Panic attack       * clonazePAM 1 MG tablet    klonoPIN    15 tablet    Take 0.5-1 tablets (0.5-1 mg) by mouth 2 times daily as needed for anxiety    Anxiety       cyclobenzaprine 5 MG tablet    FLEXERIL     Take 5 mg by mouth Reported on 5/19/2017        DULoxetine 30 MG EC capsule    CYMBALTA    60 capsule    1 tab daily for 1 week then 1 tab BID    Chronic back pain, unspecified back location, unspecified back pain laterality       FLUoxetine 20 MG capsule    PROzac    90 capsule    Take 3 capsules (60 mg) by mouth daily    Anxiety       folic acid 1 MG tablet    FOLVITE     Reported on 5/19/2017        * HUMIRA 40 MG/0.8ML prefilled syringe kit   Generic drug:  adalimumab      Inject 40 mg Subcutaneous Reported on 5/19/2017        * HUMIRA PEN 40 MG/0.8ML pen kit   Generic drug:  adalimumab           hydrOXYzine 25 MG tablet    ATARAX    90 tablet    Take 1-2 tablets (25-50 mg) by mouth every 6 hours as needed for anxiety    Panic attack       IBUPROFEN PO      Take 800 mg by mouth every 6 hours as needed for moderate pain Reported on 5/19/2017        methocarbamol 750 MG tablet    ROBAXIN    60 tablet    Take 1 tablet (750 mg) by mouth 3 times daily as needed for muscle spasms    Muscle ache       methotrexate 2.5 MG tablet CHEMO       6 tablets Reported on 4/3/2017        methylPREDNISolone 4 MG tablet    MEDROL DOSEPAK    21 tablet    Follow package instructions        * nicotine polacrilex 2 MG gum    EQ NICOTINE POLACRILEX    40 tablet    Place 1 each (2 mg) inside cheek as needed for smoking cessation    Tobacco use disorder       * nicotine polacrilex 2 MG gum    NICORETTE    240 tablet    1 piece every 2 hours for 6 weeks, then 1 piece every 3-4 hours for 3 weeks then 1 piece every 6-8 hours for 3 weeks, gradual taper. No more than 30 pieces per day.    Tobacco use disorder       nortriptyline 10 MG capsule    PAMELOR    180 capsule    3 tabs at bedtime. If chronic pain continues, may continue increasing dose 1 additional tab at bedtime every 3 nights until gets to 50 mg and we can switch to 25 mg tabs.    Anxiety, Other chronic pain       orphenadrine 100 MG 12 hr tablet    NORFLEX    60 tablet    Take 1 tablet (100 mg) by mouth 2 times daily as needed for muscle spasms or moderate to severe pain    Lumbosacral spondylosis without myelopathy, DDD (degenerative disc disease), lumbar, Sacroiliac joint pain, Ankylosing spondylitis (H), Myofascial pain       * oxyCODONE 5 MG capsule    OXY-IR     Take 5 mg by mouth every 6 hours as needed for moderate to severe pain Reported on 4/3/2017        * oxyCODONE 10 MG IR tablet    ROXICODONE    21 tablet    Take 1 tablet (10 mg) by mouth every 8 hours as needed for moderate to severe pain    Chronic pain syndrome       * oxyCODONE 10 MG IR tablet    ROXICODONE    6 tablet    Take 1 tablet (10 mg) by mouth every 8 hours as needed for moderate to severe pain    Chronic back pain, unspecified back location, unspecified back pain laterality       * oxyCODONE 5 MG IR tablet    ROXICODONE    15 tablet    May take one tablet every 6 hours max of 3 tabs per day and will allow #15 tablets per month. Must last 30 days. OK to fill and begin on 6/23/2017    Lumbosacral spondylosis without myelopathy, DDD  (degenerative disc disease), lumbar, Chronic pain syndrome       * pregabalin 25 MG capsule    LYRICA    60 capsule    Take 25mg in the morning and afternoon    Lumbosacral spondylosis without myelopathy, DDD (degenerative disc disease), lumbar, Sacroiliac joint pain, Ankylosing spondylitis (H), Myofascial pain       * pregabalin 50 MG capsule    LYRICA    30 capsule    Take 1 capsule (50 mg) by mouth every evening    Lumbosacral spondylosis without myelopathy, DDD (degenerative disc disease), lumbar, Sacroiliac joint pain, Ankylosing spondylitis (H), Myofascial pain       * pregabalin 50 MG capsule    LYRICA    60 capsule    Take 1 capsule (50 mg) by mouth 2 times daily    Chronic pain of left knee, Chronic pain syndrome, Chronic bilateral low back pain without sciatica, Lumbosacral spondylosis without myelopathy       tamsulosin 0.4 MG capsule    FLOMAX    30 capsule    Take 1 capsule (0.4 mg) by mouth daily    Lower urinary tract symptoms (LUTS)       * Notice:  This list has 13 medication(s) that are the same as other medications prescribed for you. Read the directions carefully, and ask your doctor or other care provider to review them with you.

## 2017-06-28 ENCOUNTER — OFFICE VISIT (OUTPATIENT)
Dept: PALLIATIVE MEDICINE | Facility: CLINIC | Age: 52
End: 2017-06-28
Payer: COMMERCIAL

## 2017-06-28 DIAGNOSIS — M06.9 RHEUMATOID ARTHRITIS, INVOLVING UNSPECIFIED SITE, UNSPECIFIED RHEUMATOID FACTOR PRESENCE: ICD-10-CM

## 2017-06-28 DIAGNOSIS — G89.4 CHRONIC PAIN SYNDROME: Primary | ICD-10-CM

## 2017-06-28 DIAGNOSIS — M51.369 DDD (DEGENERATIVE DISC DISEASE), LUMBAR: ICD-10-CM

## 2017-06-28 PROCEDURE — 96152 HC HEALTH AND BEHAVIOR INTERVENTION, INDIVIDUAL, EACH 15 MINUTES: CPT | Performed by: PSYCHOLOGIST

## 2017-06-28 NOTE — PROGRESS NOTES
[facility]   [facility] [place]  Behavioral Medicine Visit    Patient Name: Jailene Breen    YOB: 1965   Medical Record Number: [id number]  Date: 6/28/2017                SUBJECTIVE:  Session objective: Met with Jailene Breen on this day for an elective pain psychology appointment. This is our fifth visit post intake. Today's session focused on Jailene's progress to date with his pain management. In particular we discussed his depressive symptoms and whether they may be interfering with him developing a pain psychology treatment plan. He reports that they may be but also that he believes his depression is driven by his pain and his unemployment. We discussed Jailene's outlook on employment and how it seemed unreasonable. For example, he reports he has the possibility of getting work as a general . He notes that he thinks he would be good at it because he has skills in those areas. He omits the reality that at present his physical pain limits his capacity to do that type of work in any ongoing way. He admits that he has done little to consider the possibility that he may need to retrain in order to work again in a job environment that he can tolerate.    When discussing career options Jailene has expressed interest in choices that are hard to obtain and would result in no income for a lengthy period of time. It is pointed out to him that this also is unrealistic. It was recommended that he look into retraining that requires a shorter time in the classroom, accompanied by a living wage, with reasonable prospects in the job market. We discussed even if he is not ready to begin some type of very training he could be doing the foundational work required to make a decision about what to do. We discussed again how his thinking interferes with his actions.    We discussed the potential for less to begin services  with a mental health professional. Chris is resistant to this idea stating again he believes his mood will improve when his pain is more manageable and when he is employed again. He also cites extremely negative encounters, particularly in psychiatry, within the last 6 months. He reports he felt accused and disrespected to such a large degree that he walked out of a session with the attending psychiatric provider.    Chris agrees today that he will spend up to 3 one hour blocks of time over the next 3 weeks taking active steps toward investigating interest areas and viable pathways for retraining. He has requested to continue to see me given my background. This is acceptable to me for at least a continued 4-5 visits contingent upon his being active toward treatment goals that will facilitate improvement in pain management.    Interval history: Follow up in 3 weeks    OBJECTIVE:   Length of Visit: 60 minutes      Assessment: Current Emotional / Mental Status    Appearance:   Appropriate   Eye Contact:   Fair   Psychomotor Behavior: Normal   Attitude:   Cooperative   Orientation:   All  Speech   Rate / Production: Normal    Volume:  Normal   Mood:    Anxious  Depressed   Affect:    Appropriate  Blunted  Flat  Worrisome   Thought Content:  Clear   Thought Form:  Coherent  Logical   Insight:    Fair     ASSESSMENT:   Axis I: Chronic pain syndrome, rheumatoid arthritis, degenerative disc disease, lumbar  Axis II: Dx deferred    Progress toward goals: poor.      Pain Status: improved    Emotional Status: unchanged   Medication / chemical use concerns: None    PLAN:   Next Appointment: Jailene Breen will schedule a follow-up appointment in 3 weeks.  Assignment: See above  Objectives / interventions for next session: take concrete steps to help with pain acceptance    Padilla Keene MA, LP, [facility]  Pain Specialist  [facility]

## 2017-06-28 NOTE — MR AVS SNAPSHOT
After Visit Summary   6/28/2017    Jailene Breen    MRN: [id number]           Patient Information     Date Of Birth          1965        Visit Information        Provider Department      6/28/2017 9:00 AM Padilla Keene LP [facility]        Today's Diagnoses     Chronic pain syndrome    -  1    Rheumatoid arthritis, involving unspecified site, unspecified rheumatoid factor presence (H)        DDD (degenerative disc disease), lumbar           Follow-ups after your visit        Your next 10 appointments already scheduled     Jul 11, 2017  7:40 AM CDT   ARANZA Extremity with Armando Tapia, PT   [facility] [place] [facility] ([facility] [place])    [address]  [place] [address]   [phone]            Jul 19, 2017  9:00 AM CDT   Return Visit with Padilla Keene LP   [facility] ([facility])    [address] [address]   [phone]            Aug 08, 2017  3:30 PM CDT   Return Visit with JUANITA Ga CNP   [facility] ([facility])    [address] [address]   [phone]              Who to contact     If you have questions or need follow up information about today's clinic visit or your schedule please contact [facility] directly at [phone].  Normal or non-critical lab and imaging results will be communicated to you by MyChart, letter or phone within 4 business days after the clinic has received the results. If you do not hear from us within 7 days, please contact the clinic through MyChart or phone. If you have a critical or abnormal lab result, we will notify you by phone as soon as possible.  Submit refill requests through [employer] or call your pharmacy and they will forward the refill request to us. Please allow 3 business days for your refill to be completed.          Additional  Information About Your Visit        [employer] Information     [email or web address] gives you secure access to your electronic health record. If you see a primary care provider, you can also send messages to your care team and make appointments. If you have questions, please call your primary care clinic.  If you do not have a primary care provider, please call [phone] and they will assist you.        Care EveryWhere ID     This is your Care EveryWhere ID. This could be used by other organizations to access your [place] medical records  [id number]         Blood Pressure from Last 3 Encounters:   06/06/17 (!) 151/94   05/31/17 (!) 131/91   05/17/17 (!) 148/94    Weight from Last 3 Encounters:   05/19/17 102.1 kg (225 lb)   05/17/17 103.2 kg (227 lb 9.6 oz)   03/31/17 98.9 kg (218 lb)              We Performed the Following     HEALTH & BEHAVIOR ASSESS, INITIAL, EA 15MIN PHD        Primary Care Provider Office Phone # Fax #    Reinaldomanuel JASON Meyer [phone]        URGENT CARE Pool [address] [facility] [address]        Equal Access to Services     [facility] : Hadii aad ku hadasho Soomaali, waaxda luqadaha, qaybta kaalmada adeegyada, waxay idiin hayaan adeeg kharagabino lachin . So [facility] [phone].    ATENCIÓN: Si habla español, tiene a carrington disposición servicios gratuitos de asistencia lingüística. Llame al [phone].    We comply with applicable federal civil rights laws and [place] laws. We do not discriminate on the basis of race, color, national origin, age, disability sex, sexual orientation or gender identity.            Thank you!     Thank you for choosing [facility]  for your care. Our goal is always to provide you with excellent care. Hearing back from our patients is one way we can continue to improve our services. Please take a few minutes to complete the written survey that you may receive in the mail after your visit with us. Thank you!             Your  Updated Medication List - Protect others around you: Learn how to safely use, store and throw away your medicines at www.disposemymeds.org.          This list is accurate as of: 6/28/17 11:19 AM.  Always use your most recent med list.                   Brand Name Dispense Instructions for use Diagnosis    busPIRone 15 MG tablet    BUSPAR    90 tablet    O.5 tabs PO BID for 3 days then 0.5 tab qam and 1 tab qhs for 3 days then 1 tab bid for 3 days.  If still sympotmatic,  may increase to 1 tab QAM and 1.5 tabs at bedtime for 3 days, then 1.5 tabs BID for 3 days, then 1.5 tabs q am and 2 tabs QHS for 3 days, then 2 tabs BID.    Anxiety       * clonazePAM 1 MG tablet    klonoPIN    4 tablet    Take 0.5-1 tablets (0.5-1 mg) by mouth 2 times daily as needed for anxiety    Panic attack       * clonazePAM 1 MG tablet    klonoPIN    15 tablet    Take 0.5-1 tablets (0.5-1 mg) by mouth 2 times daily as needed for anxiety    Anxiety       cyclobenzaprine 5 MG tablet    FLEXERIL     Take 5 mg by mouth Reported on 5/19/2017        DULoxetine 30 MG EC capsule    CYMBALTA    60 capsule    1 tab daily for 1 week then 1 tab BID    Chronic back pain, unspecified back location, unspecified back pain laterality       FLUoxetine 20 MG capsule    PROzac    90 capsule    Take 3 capsules (60 mg) by mouth daily    Anxiety       folic acid 1 MG tablet    FOLVITE     Reported on 5/19/2017        * HUMIRA 40 MG/0.8ML prefilled syringe kit   Generic drug:  adalimumab      Inject 40 mg Subcutaneous Reported on 5/19/2017        * HUMIRA PEN 40 MG/0.8ML pen kit   Generic drug:  adalimumab           hydrOXYzine 25 MG tablet    ATARAX    90 tablet    Take 1-2 tablets (25-50 mg) by mouth every 6 hours as needed for anxiety    Panic attack       IBUPROFEN PO      Take 800 mg by mouth every 6 hours as needed for moderate pain Reported on 5/19/2017        methocarbamol 750 MG tablet    ROBAXIN    60 tablet    Take 1 tablet (750 mg) by mouth 3 times  daily as needed for muscle spasms    Muscle ache       methotrexate 2.5 MG tablet CHEMO      6 tablets Reported on 4/3/2017        methylPREDNISolone 4 MG tablet    MEDROL DOSEPAK    21 tablet    Follow package instructions        * nicotine polacrilex 2 MG gum    EQ NICOTINE POLACRILEX    40 tablet    Place 1 each (2 mg) inside cheek as needed for smoking cessation    Tobacco use disorder       * nicotine polacrilex 2 MG gum    NICORETTE    240 tablet    1 piece every 2 hours for 6 weeks, then 1 piece every 3-4 hours for 3 weeks then 1 piece every 6-8 hours for 3 weeks, gradual taper. No more than 30 pieces per day.    Tobacco use disorder       nortriptyline 10 MG capsule    PAMELOR    180 capsule    3 tabs at bedtime. If chronic pain continues, may continue increasing dose 1 additional tab at bedtime every 3 nights until gets to 50 mg and we can switch to 25 mg tabs.    Anxiety, Other chronic pain       orphenadrine 100 MG 12 hr tablet    NORFLEX    60 tablet    Take 1 tablet (100 mg) by mouth 2 times daily as needed for muscle spasms or moderate to severe pain    Lumbosacral spondylosis without myelopathy, DDD (degenerative disc disease), lumbar, Sacroiliac joint pain, Ankylosing spondylitis (H), Myofascial pain       * oxyCODONE 5 MG capsule    OXY-IR     Take 5 mg by mouth every 6 hours as needed for moderate to severe pain Reported on 4/3/2017        * oxyCODONE 10 MG IR tablet    ROXICODONE    21 tablet    Take 1 tablet (10 mg) by mouth every 8 hours as needed for moderate to severe pain    Chronic pain syndrome       * oxyCODONE 10 MG IR tablet    ROXICODONE    6 tablet    Take 1 tablet (10 mg) by mouth every 8 hours as needed for moderate to severe pain    Chronic back pain, unspecified back location, unspecified back pain laterality       * oxyCODONE 5 MG IR tablet    ROXICODONE    15 tablet    May take one tablet every 6 hours max of 3 tabs per day and will allow #15 tablets per month. Must last 30 days.  OK to fill and begin on 6/23/2017    Lumbosacral spondylosis without myelopathy, DDD (degenerative disc disease), lumbar, Chronic pain syndrome       * pregabalin 25 MG capsule    LYRICA    60 capsule    Take 25mg in the morning and afternoon    Lumbosacral spondylosis without myelopathy, DDD (degenerative disc disease), lumbar, Sacroiliac joint pain, Ankylosing spondylitis (H), Myofascial pain       * pregabalin 50 MG capsule    LYRICA    30 capsule    Take 1 capsule (50 mg) by mouth every evening    Lumbosacral spondylosis without myelopathy, DDD (degenerative disc disease), lumbar, Sacroiliac joint pain, Ankylosing spondylitis (H), Myofascial pain       * pregabalin 50 MG capsule    LYRICA    60 capsule    Take 1 capsule (50 mg) by mouth 2 times daily    Chronic pain of left knee, Chronic pain syndrome, Chronic bilateral low back pain without sciatica, Lumbosacral spondylosis without myelopathy       tamsulosin 0.4 MG capsule    FLOMAX    30 capsule    Take 1 capsule (0.4 mg) by mouth daily    Lower urinary tract symptoms (LUTS)       * Notice:  This list has 13 medication(s) that are the same as other medications prescribed for you. Read the directions carefully, and ask your doctor or other care provider to review them with you.

## 2017-07-02 ENCOUNTER — MYC MEDICAL ADVICE (OUTPATIENT)
Dept: PALLIATIVE MEDICINE | Facility: CLINIC | Age: 52
End: 2017-07-02

## 2017-07-03 ENCOUNTER — MYC MEDICAL ADVICE (OUTPATIENT)
Dept: FAMILY MEDICINE | Facility: CLINIC | Age: 52
End: 2017-07-03

## 2017-07-03 NOTE — TELEPHONE ENCOUNTER
Per patient [employer] message:  Created: 7/3/2017 11:26 AM      Yes I was off it from April 15 till last week.

## 2017-07-03 NOTE — TELEPHONE ENCOUNTER
Per patient Pavel message:    To: JUANITA Ga CNP  Subject: RE: Updates about my health    Hi, thanks for responding.  Since I hurt my knee on 4/15/17 I have been off it pretty much till last week. I do ice it and us a heat pad when at home. I also use a tens unit on it. I have started physical therapy and am doing the exercises. I tolerate the lyrica and seems to help with my back. I have been taking the pain meds only up to 3x a day when I need it, but the last couple weeks I have needed everyday as Julia been hurting real bad. Well I hope I answered most of your questions, thanks again and have a great 4th!

## 2017-07-03 NOTE — TELEPHONE ENCOUNTER
"My chart below sent to patient. Routing as FYI to provider. Waiting on patient reply.     Lopez Swift,     I am sorry that you are struggling with knee pain. When I reviewed your chart I can see that at yoru last visit 06/20/17 you were to increase Lyrica to 25mg in the morning and 50mg at bedtime for 7 days, then take 50mg twice daily.  Are you currently up to the 50mg of Lyrica twice a day? How are you tolerating that? I also see that you were provided with a script for Oxycodone for the post procedural pain. A 30 day supply was ordered with the following dosing:May take one tablet every 6 hours max of 3 tabs per day and will allow #15 tablets per month. How have you been taking this?  You chart also indicates: \"I will take 6-8 weeks of non-weight-bearing to heal the bone contusion in the left knee\".  Have you been able to maintain non-weight bearing? I also see that your provider placed a referral to [facility] physical therapy for the left knee pain, which you have scheduled already for 07/11/17.    What self cares measures have you done at home for this?  Heat or ice packs and elevation of you leg? Are you pacing activities/providing your leg time of rest as the day goes by?   Alternating use of heat and cold packs to the painful areas is very effective. For example, warm water compress or heating pad on warm setting for 10 minutes, then cold pack for 1-2 minutes, warm again for 4 minutes, then cold for 1 minute. Go back to warm for another 4 minutes, then cold for 1 minute, ending with another 4 minutes of warm.    You can also utilize things like meditation, guided imagery, or journaling as a way to distract yourself. Journaling offers the additional benefit of identifying potential causes for flare ups.  Gentle stretching and movement of the painful area can be beneficial, as well as learning how to pace your activities to avoid overusing the painful area and the resulting increased pain.  Really listen to your body " about when it is time to stop and take a break or slow down the pace. Another option is plain, simple, distraction therapy. Research has shown when patients listen to music, read, or become occupied in an activity that takes their mind off the pain, it does lead to reduced levels of pain as your brain interrupts the pain cycle to pay attention to another focus.  We hope you find some of these helpful as additional tools in managing your pain along the way.    I will also send your message to your provider to review. However, please keep in mind it is a holiday week and many providers are out of the office.     Ida CHURCHILLN-RN Care Coordinator  [facility]

## 2017-07-03 NOTE — TELEPHONE ENCOUNTER
I agree with the suggestions for pain flare management as presented by Ida Larsen RN and Cha Molina RN.    Susana GRANT, RN CNP, FNP  [facility]

## 2017-07-03 NOTE — TELEPHONE ENCOUNTER
Per patient myChart message:  From: Jailene Breen      Created: 7/3/2017 1:11 PM      Great, so I just have to be in pain and suffer, tell her thanks.

## 2017-07-03 NOTE — TELEPHONE ENCOUNTER
Tariq sent to pt:  Hi Les.  You are doing all of the correct things for management of this flare.  Unfortunately there are not always a quick fix/answer for chronic pain.  Just a reminder to pace yourself and not over do it.  I will have Susana Pike, YURY review.  -----  Routed to Susana.    Cha Molina RN-BSN  [facility]

## 2017-07-03 NOTE — TELEPHONE ENCOUNTER
Tariq sent to pt:  Susana Pike NP reviewed this and doesn't have any new recommendations for you at this time.  ----  Will keep encounter open for a couple of days in anticipation of patient call back.     Cha Molina RN-BSN  [facility]

## 2017-07-03 NOTE — TELEPHONE ENCOUNTER
"Mychart sent to pt:  From: Cha Molina RN      Created: 7/3/2017 11:20 AM      Hi Les.  Your chart also indicates: \"I will take 6-8 weeks of non-weight-bearing to heal the bone contusion in the left knee\".  Have you been able to maintain non-weight bearing?    Cha Molina RN-KERLINEN  [facility]        Will await response back.    Cha Molina RN-BSN  [facility]    "

## 2017-07-06 ENCOUNTER — TELEPHONE (OUTPATIENT)
Dept: FAMILY MEDICINE | Facility: CLINIC | Age: 52
End: 2017-07-06

## 2017-07-06 ENCOUNTER — MYC MEDICAL ADVICE (OUTPATIENT)
Dept: FAMILY MEDICINE | Facility: CLINIC | Age: 52
End: 2017-07-06

## 2017-07-06 DIAGNOSIS — F17.200 TOBACCO USE DISORDER: ICD-10-CM

## 2017-07-06 NOTE — TELEPHONE ENCOUNTER
Reason for Call:  Other prescription    Detailed comments: patient states the Pharmacy did not receive refill request for medication. Patient is requesting Dr tiwari to [id number] - [place], [place] - [address]    Phone Number Patient can be reached at: Cell number on file:    Telephone Information:   Mobile [phone]       Best Time: Anytime     Can we leave a detailed message on this number? YES    Call taken on 7/6/2017 at 11:38 AM by Baldemar Woodard

## 2017-07-10 NOTE — TELEPHONE ENCOUNTER
Sent via E-Prescribed and confirmed at pharmacy 7/6/17 5:29 pm, patient notified via [employer].  Genaro REINA

## 2017-07-11 ENCOUNTER — MYC MEDICAL ADVICE (OUTPATIENT)
Dept: PALLIATIVE MEDICINE | Facility: CLINIC | Age: 52
End: 2017-07-11

## 2017-07-11 NOTE — TELEPHONE ENCOUNTER
Received from patient 7/11/17 at 1059:    Hi, just letting you know that I lost my new job because of having to take off a lot of work because of the knee pain. I'm hurting real bad and have nothing to help and I'm feeling abandoned to suffer.      Routing to provider for review. Last office visit 6/6/17 - note did not have details or recommendations for nursing to respond to patient.    Ethel Jordan, MSN, RN-BC  Care Coordinator  [facility]

## 2017-07-17 ENCOUNTER — MYC MEDICAL ADVICE (OUTPATIENT)
Dept: PALLIATIVE MEDICINE | Facility: CLINIC | Age: 52
End: 2017-07-17

## 2017-07-17 DIAGNOSIS — M51.369 DDD (DEGENERATIVE DISC DISEASE), LUMBAR: ICD-10-CM

## 2017-07-17 DIAGNOSIS — G89.4 CHRONIC PAIN SYNDROME: ICD-10-CM

## 2017-07-17 DIAGNOSIS — M47.817 LUMBOSACRAL SPONDYLOSIS WITHOUT MYELOPATHY: ICD-10-CM

## 2017-07-17 NOTE — TELEPHONE ENCOUNTER
My chart message from pt:    Hi, I was wondering if you could refill my pain meds. The 23rd is next Sunday and I have an appointment on Wednesday. It would be convenient for me to pick it up then.     Sending to MA pool to prep refill.     Sondra Hearn RN, [facility]  Pain Clinic Care Coordinator

## 2017-07-17 NOTE — TELEPHONE ENCOUNTER
Medication refill information reviewed.     Due date for oxycodone 5 mg is 7/23/17     Prescription prepped for review.     Will route to provider.     LETHA Parker, RN-BC  Patient Care Supervisor/Care Coordinator  [facility] [place]

## 2017-07-17 NOTE — TELEPHONE ENCOUNTER
Pt has an appt tomorrow for follow up of knee pain with [facility].     LETHA Parker, RN-BC  Patient Care Supervisor/Care Coordinator  [facility] [place]

## 2017-07-17 NOTE — TELEPHONE ENCOUNTER
Received call from patient requesting refill(s) of oxyCODONE (ROXICODONE) 5 MG IR tablet     Last picked up from pharmacy on 6/23/17    Pt last seen by prescribing provider on 6/6/17  Next appt scheduled for 8/8/17    Last urine drug screen date 3/31/17  Current opioid agreement on file (completed within the last year) Yes Date of opioid agreement: 4/13/17    Processing (pick one)    Pt will  in clinic at [facility] location    Will route to nursing pool for review and preparation of prescription(s).

## 2017-07-18 RX ORDER — OXYCODONE HYDROCHLORIDE 5 MG/1
TABLET ORAL
Qty: 15 TABLET | Refills: 0 | Status: SHIPPED | OUTPATIENT
Start: 2017-07-18 | End: 2017-10-02

## 2017-07-18 NOTE — TELEPHONE ENCOUNTER
Signed Prescriptions:                        Disp   Refills    oxyCODONE (ROXICODONE) 5 MG IR tablet      15 tab*0        Sig: May take one tablet every 6 hours max of 3 tabs per           day and will allow #15 tablets per month. Must           last 30 days. OK to fill on/after 7/21 and begin           on/after 7/23/2017  Authorizing Provider: SUSANA PETTY    Signed script placed in touchdown bin at [facility].    Susana Petty APRN CNP FNP RN  [facility]

## 2017-07-24 ENCOUNTER — MYC MEDICAL ADVICE (OUTPATIENT)
Dept: FAMILY MEDICINE | Facility: CLINIC | Age: 52
End: 2017-07-24

## 2017-07-26 ENCOUNTER — TELEPHONE (OUTPATIENT)
Dept: FAMILY MEDICINE | Facility: CLINIC | Age: 52
End: 2017-07-26

## 2017-07-26 NOTE — TELEPHONE ENCOUNTER
Solo requesting Prior Auth:    Plan does not cover nicotine polacrilex (NICORETTE) 2 MG gum.  Please call [phone] to initiate Prior Auth or change med.    ID# [id number]    Insurance allows only 168 day worth, pa required for more.      Renetta [facility]

## 2017-07-27 NOTE — TELEPHONE ENCOUNTER
Spoke to patient and scheduled an appointment for   Tomorrow, 7/28/17 at 9:40 with Katarina.  Shanelle Glass MA/  [employer] [facility] [employer]

## 2017-07-27 NOTE — TELEPHONE ENCOUNTER
Patient has had multiple prescriptions for Nicotine gum in the last 6 months, so if it dis not help him to quit, then it wont. Patient should follow up to have other options discussed.    Katarina Haywood PAC

## 2017-07-28 ENCOUNTER — OFFICE VISIT (OUTPATIENT)
Dept: FAMILY MEDICINE | Facility: CLINIC | Age: 52
End: 2017-07-28
Payer: COMMERCIAL

## 2017-07-28 VITALS
WEIGHT: 241 LBS | SYSTOLIC BLOOD PRESSURE: 139 MMHG | HEIGHT: 76 IN | RESPIRATION RATE: 22 BRPM | HEART RATE: 92 BPM | DIASTOLIC BLOOD PRESSURE: 88 MMHG | OXYGEN SATURATION: 100 % | TEMPERATURE: 98.6 F | BODY MASS INDEX: 29.35 KG/M2

## 2017-07-28 DIAGNOSIS — Z72.0 TOBACCO ABUSE: Primary | ICD-10-CM

## 2017-07-28 DIAGNOSIS — G89.29 OTHER CHRONIC PAIN: ICD-10-CM

## 2017-07-28 DIAGNOSIS — F41.9 ANXIETY: ICD-10-CM

## 2017-07-28 DIAGNOSIS — L21.9 SEBORRHEIC DERMATITIS: ICD-10-CM

## 2017-07-28 PROCEDURE — 99214 OFFICE O/P EST MOD 30 MIN: CPT | Performed by: PHYSICIAN ASSISTANT

## 2017-07-28 RX ORDER — BUPROPION HYDROCHLORIDE 150 MG/1
150 TABLET, EXTENDED RELEASE ORAL 2 TIMES DAILY
Qty: 180 TABLET | Refills: 1 | Status: SHIPPED | OUTPATIENT
Start: 2017-07-28 | End: 2019-03-27

## 2017-07-28 RX ORDER — CLONAZEPAM 1 MG/1
0.5-1 TABLET ORAL 2 TIMES DAILY PRN
Qty: 15 TABLET | Refills: 0 | Status: SHIPPED | OUTPATIENT
Start: 2017-07-28 | End: 2017-08-28

## 2017-07-28 RX ORDER — NORTRIPTYLINE HYDROCHLORIDE 50 MG/1
50 CAPSULE ORAL AT BEDTIME
Qty: 90 CAPSULE | Refills: 1 | Status: SHIPPED | OUTPATIENT
Start: 2017-07-28 | End: 2018-02-16

## 2017-07-28 RX ORDER — KETOCONAZOLE 20 MG/ML
SHAMPOO TOPICAL
Qty: 120 ML | Refills: 1 | Status: SHIPPED | OUTPATIENT
Start: 2017-07-28 | End: 2019-03-27

## 2017-07-28 RX ORDER — NORTRIPTYLINE HCL 10 MG
CAPSULE ORAL
Qty: 180 CAPSULE | Refills: 1 | Status: SHIPPED | OUTPATIENT
Start: 2017-07-28 | End: 2017-07-28

## 2017-07-28 ASSESSMENT — PAIN SCALES - GENERAL: PAINLEVEL: EXTREME PAIN (8)

## 2017-07-28 ASSESSMENT — ANXIETY QUESTIONNAIRES
GAD7 TOTAL SCORE: 8
2. NOT BEING ABLE TO STOP OR CONTROL WORRYING: SEVERAL DAYS
5. BEING SO RESTLESS THAT IT IS HARD TO SIT STILL: SEVERAL DAYS
6. BECOMING EASILY ANNOYED OR IRRITABLE: SEVERAL DAYS
7. FEELING AFRAID AS IF SOMETHING AWFUL MIGHT HAPPEN: NOT AT ALL
1. FEELING NERVOUS, ANXIOUS, OR ON EDGE: MORE THAN HALF THE DAYS
3. WORRYING TOO MUCH ABOUT DIFFERENT THINGS: SEVERAL DAYS

## 2017-07-28 ASSESSMENT — PATIENT HEALTH QUESTIONNAIRE - PHQ9: 5. POOR APPETITE OR OVEREATING: MORE THAN HALF THE DAYS

## 2017-07-28 NOTE — NURSING NOTE
"Chief Complaint   Patient presents with     Smoking Cessation     Refill Request     Anxiety       Initial BP (!) 141/93 (BP Location: Left arm, Patient Position: Chair, Cuff Size: Adult Large)  Pulse 92  Temp 98.6  F (37  C) (Oral)  Resp 22  Ht 6' 4\" (1.93 m)  Wt 241 lb (109.3 kg)  SpO2 100%  BMI 29.34 kg/m2 Estimated body mass index is 29.34 kg/(m^2) as calculated from the following:    Height as of this encounter: 6' 4\" (1.93 m).    Weight as of this encounter: 241 lb (109.3 kg).  Medication Reconciliation: complete   Charisma Cagle CMA      "

## 2017-07-28 NOTE — PROGRESS NOTES
SUBJECTIVE:                                                    Jailene Breen is a 51 year old male who presents to clinic today for the following health issues:      Anxiety Follow-Up    Status since last visit: stable    Other associated symptoms:mild panic attacks    Complicating factors:   Significant life event: No   Current substance abuse: None  Depression symptoms: No  JEFF-7 SCORE 10/31/2016 11/2/2016 12/19/2016   Total Score - - -   Total Score - 6 (mild anxiety) -   Total Score 14 - 10       GAD7      Amount of exercise or physical activity: 4-5 days/week for an average of 30-45 minutes    Problems taking medications regularly: No    Medication side effects: none  Diet: regular (no restrictions)  Patient needs new smoking cessation medication.         Problem list and histories reviewed & adjusted, as indicated.  Additional history: as documented    Patient Active Problem List   Diagnosis     CARDIOVASCULAR SCREENING; LDL GOAL LESS THAN 160     Anxiety     Overweight (BMI 25.0-29.9)     Back pain     Tear meniscus knee, right, initial encounter     Rheumatoid arthritis involving multiple sites, unspecified rheumatoid factor presence (H)     Chronic pain     Right-sided thoracic back pain, unspecified chronicity     JEFF (generalized anxiety disorder)     Panic attack     Syncope, unspecified syncope type     Ankylosing spondylitis (H)     Tobacco use disorder     Acute pain of left knee     Past Surgical History:   Procedure Laterality Date     ARTHROSCOPY KNEE Right 9/22/2016    Procedure: ARTHROSCOPY KNEE;  Surgeon: Fredo Hughes MD;  Location: [facility] OR     INJECT PARAVERTEBRAL FACET JOINT LUMBAR / SACRAL FIRST Right 11/25/2016    Procedure: INJECT PARAVERTEBRAL FACET JOINT LUMBAR / SACRAL FIRST;  Surgeon: Doretha Magallanes MD;  Location: [facility] OR     ORTHOPEDIC SURGERY      Rt knee X 2     ORTHOPEDIC SURGERY      Lt foot       Social History   Substance Use Topics     Smoking status: Never  Smoker     Smokeless tobacco: Current User     Types: Chew      Comment: Chew     Alcohol use No      Comment: none     No family history on file.      Current Outpatient Prescriptions   Medication Sig Dispense Refill     clonazePAM (KLONOPIN) 1 MG tablet Take 0.5-1 tablets (0.5-1 mg) by mouth 2 times daily as needed for anxiety 15 tablet 0     buPROPion (WELLBUTRIN SR) 150 MG 12 hr tablet Take 1 tablet (150 mg) by mouth 2 times daily 180 tablet 1     nortriptyline (PAMELOR) 50 MG capsule Take 1 capsule (50 mg) by mouth At Bedtime 90 capsule 1     ketoconazole (NIZORAL) 2 % shampoo Apply to the affected area and wash off after 5 minutes.use until skin condition is better 120 mL 1     oxyCODONE (ROXICODONE) 5 MG IR tablet May take one tablet every 6 hours max of 3 tabs per day and will allow #15 tablets per month. Must last 30 days. OK to fill on/after 7/21 and begin on/after 7/23/2017 15 tablet 0     HUMIRA PEN 40 MG/0.8ML pen kit        [DISCONTINUED] nortriptyline (PAMELOR) 10 MG capsule 3 tabs at bedtime. If chronic pain continues, may continue increasing dose 1 additional tab at bedtime every 3 nights until gets to 50 mg and we can switch to 25 mg tabs. 180 capsule 1     [DISCONTINUED] clonazePAM (KLONOPIN) 1 MG tablet Take 0.5-1 tablets (0.5-1 mg) by mouth 2 times daily as needed for anxiety 15 tablet 0     [DISCONTINUED] nortriptyline (PAMELOR) 10 MG capsule 3 tabs at bedtime. If chronic pain continues, may continue increasing dose 1 additional tab at bedtime every 3 nights until gets to 50 mg and we can switch to 25 mg tabs. 180 capsule 1     oxyCODONE (ROXICODONE) 10 MG IR tablet Take 1 tablet (10 mg) by mouth every 8 hours as needed for moderate to severe pain (Patient not taking: Reported on 3/1/2017) 6 tablet 0     cyclobenzaprine (FLEXERIL) 5 MG tablet Take 5 mg by mouth Reported on 5/19/2017       [DISCONTINUED] clonazePAM (KLONOPIN) 1 MG tablet Take 0.5-1 tablets (0.5-1 mg) by mouth 2 times daily as  "needed for anxiety (Patient not taking: Reported on 3/1/2017) 4 tablet 0     oxyCODONE (OXY-IR) 5 MG capsule Take 5 mg by mouth every 6 hours as needed for moderate to severe pain Reported on 4/3/2017       methotrexate 2.5 MG tablet 6 tablets Reported on 4/3/2017       adalimumab (HUMIRA) 40 MG/0.8ML prefilled syringe kit Inject 40 mg Subcutaneous Reported on 5/19/2017       folic acid (FOLVITE) 1 MG tablet Reported on 5/19/2017       IBUPROFEN PO Take 800 mg by mouth every 6 hours as needed for moderate pain Reported on 5/19/2017       Allergies   Allergen Reactions     Hydrocodone Itching     Remeron Soltab Other (See Comments)     \"Blacked out\" for one week         Reviewed and updated as needed this visit by clinical staffTobacco  Allergies  Meds       Reviewed and updated as needed this visit by Provider         ROS:  Constitutional, HEENT, cardiovascular, pulmonary, gi and gu systems are negative, except as otherwise noted.      OBJECTIVE:   /88 (BP Location: Right arm, Patient Position: Chair, Cuff Size: Adult Large)  Pulse 92  Temp 98.6  F (37  C) (Oral)  Resp 22  Ht 6' 4\" (1.93 m)  Wt 241 lb (109.3 kg)  SpO2 100%  BMI 29.34 kg/m2  Body mass index is 29.34 kg/(m^2).  GENERAL: healthy, alert and no distress  EYES: Eyes grossly normal to inspection,  conjunctivae and sclerae normal  HENT: normal cephalic/atraumatic, face is symmetrical,   RESP: no difficulty breathing, no use of accessory muscles observed.   CV: no peripheral edema and color normal, no parasternal heaves visualized.   MS: no gross musculoskeletal defects noted, no edema  SKIN: no suspicious lesions or rashes  NEURO: Normal strength and tone, mentation intact and speech normal  PSYCH: mentation appears normal, affect normal/bright      Diagnostic Test Results:  none     ASSESSMENT/PLAN:       ICD-10-CM    1. Tobacco abuse Z72.0 buPROPion (WELLBUTRIN SR) 150 MG 12 hr tablet   2. Anxiety F41.9 clonazePAM (KLONOPIN) 1 MG tablet    "  buPROPion (WELLBUTRIN SR) 150 MG 12 hr tablet     nortriptyline (PAMELOR) 50 MG capsule     DISCONTINUED: nortriptyline (PAMELOR) 10 MG capsule   3. Other chronic pain G89.29 nortriptyline (PAMELOR) 50 MG capsule     DISCONTINUED: nortriptyline (PAMELOR) 10 MG capsule   4. Seborrheic dermatitis L21.9 ketoconazole (NIZORAL) 2 % shampoo   Wellbutrin 150 mg twice a day , start with 150 mg daily for 3 days then increase to twice a day, for anxiety and tobacco    Continue Nortriptyline   Clonopin 1 mg daily as needed for panic attack  Follow up in 6 month or sooner as needed         Nanette Haywood PA-C  [facility]

## 2017-07-28 NOTE — PATIENT INSTRUCTIONS
Wellbutrin 150 mg twice a day , start with 150 mg daily for 3 days then increase to twice a day, for anxiety and tobacco    Continue Nortriptyline   Clonopin 1 mg daily as needed for panic attack  Follow up in 6 month or sooner as needed

## 2017-07-28 NOTE — MR AVS SNAPSHOT
After Visit Summary   7/28/2017    Jailene Breen    MRN: [id number]           Patient Information     Date Of Birth          1965        Visit Information        Provider Department      7/28/2017 9:40 AM Nanette Haywood PA-C [facility]        Today's Diagnoses     Tobacco abuse    -  1    Anxiety        Other chronic pain          Care Instructions    Wellbutrin 150 mg twice a day , start with 150 mg daily for 3 days then increase to twice a day, for anxiety and tobacco    Continue Nortriptyline   Clonopin 1 mg daily as needed for panic attack  Follow up             Follow-ups after your visit        Your next 10 appointments already scheduled     Aug 08, 2017  3:30 PM CDT   Return Visit with JUANITA Ga CNP   [facility] ([facility])    [address] [address] [phone]              Who to contact     If you have questions or need follow up information about today's clinic visit or your schedule please contact [facility] directly at [phone].  Normal or non-critical lab and imaging results will be communicated to you by [employer], letter or phone within 4 business days after the clinic has received the results. If you do not hear from us within 7 days, please contact the clinic through [employer] or phone. If you have a critical or abnormal lab result, we will notify you by phone as soon as possible.  Submit refill requests through [employer] or call your pharmacy and they will forward the refill request to us. Please allow 3 business days for your refill to be completed.          Additional Information About Your Visit        [employer] Information     [employer] gives you secure access to your electronic health record. If you see a primary care provider, you can also send messages to your care team and make appointments. If you have questions, please call your primary  "care clinic.  If you do not have a primary care provider, please call [phone] and they will assist you.        Care EveryWhere ID     This is your Care EveryWhere ID. This could be used by other organizations to access your [facility] medical records  [id number]        Your Vitals Were     Pulse Temperature Respirations Height Pulse Oximetry BMI (Body Mass Index)    92 98.6  F (37  C) (Oral) 22 6' 4\" (1.93 m) 100% 29.34 kg/m2       Blood Pressure from Last 3 Encounters:   07/28/17 139/88   06/06/17 (!) 151/94   05/31/17 (!) 131/91    Weight from Last 3 Encounters:   07/28/17 241 lb (109.3 kg)   05/19/17 225 lb (102.1 kg)   05/17/17 227 lb 9.6 oz (103.2 kg)              Today, you had the following     No orders found for display         Today's Medication Changes          These changes are accurate as of: 7/28/17 10:29 AM.  If you have any questions, ask your nurse or doctor.               Start taking these medicines.        Dose/Directions    buPROPion 150 MG 12 hr tablet   Commonly known as:  WELLBUTRIN SR   Used for:  Anxiety, Tobacco abuse   Started by:  Nanette Haywood PA-C        Dose:  150 mg   Take 1 tablet (150 mg) by mouth 2 times daily   Quantity:  180 tablet   Refills:  1         These medicines have changed or have updated prescriptions.        Dose/Directions    clonazePAM 1 MG tablet   Commonly known as:  klonoPIN   This may have changed:  Another medication with the same name was removed. Continue taking this medication, and follow the directions you see here.   Used for:  Anxiety   Changed by:  Nanette Haywood PA-C        Dose:  0.5-1 mg   Take 0.5-1 tablets (0.5-1 mg) by mouth 2 times daily as needed for anxiety   Quantity:  15 tablet   Refills:  0       * oxyCODONE 5 MG capsule   Commonly known as:  OXY-IR   This may have changed:  Another medication with the same name was removed. Continue taking this medication, and follow the directions you see here. "   Changed by:  Nay Simmons NP        Dose:  5 mg   Take 5 mg by mouth every 6 hours as needed for moderate to severe pain Reported on 4/3/2017   Refills:  0       * oxyCODONE 10 MG IR tablet   Commonly known as:  ROXICODONE   This may have changed:  Another medication with the same name was removed. Continue taking this medication, and follow the directions you see here.   Used for:  Chronic back pain, unspecified back location, unspecified back pain laterality   Changed by:  Nanette Haywood PA-C        Dose:  10 mg   Take 1 tablet (10 mg) by mouth every 8 hours as needed for moderate to severe pain   Quantity:  6 tablet   Refills:  0       * oxyCODONE 5 MG IR tablet   Commonly known as:  ROXICODONE   This may have changed:  Another medication with the same name was removed. Continue taking this medication, and follow the directions you see here.   Used for:  Lumbosacral spondylosis without myelopathy, DDD (degenerative disc disease), lumbar, Chronic pain syndrome   Changed by:  Susaan Pike APRN CNP        May take one tablet every 6 hours max of 3 tabs per day and will allow #15 tablets per month. Must last 30 days. OK to fill on/after 7/21 and begin on/after 7/23/2017   Quantity:  15 tablet   Refills:  0       * Notice:  This list has 3 medication(s) that are the same as other medications prescribed for you. Read the directions carefully, and ask your doctor or other care provider to review them with you.      Stop taking these medicines if you haven't already. Please contact your care team if you have questions.     busPIRone 15 MG tablet   Commonly known as:  BUSPAR   Stopped by:  Nanette Haywood PA-C           DULoxetine 30 MG EC capsule   Commonly known as:  CYMBALTA   Stopped by:  Nanette Haywood PA-C           FLUoxetine 20 MG capsule   Commonly known as:  PROzac   Stopped by:  Nanette Haywood PA-C           hydrOXYzine 25 MG tablet    Commonly known as:  ATARAX   Stopped by:  Nanette Haywood PA-C           methocarbamol 750 MG tablet   Commonly known as:  ROBAXIN   Stopped by:  Nanette Haywood PA-C           methylPREDNISolone 4 MG tablet   Commonly known as:  MEDROL DOSEPAK   Stopped by:  Nanette Haywood PA-C           nicotine polacrilex 2 MG gum   Commonly known as:  NICORETTE   Stopped by:  Nanette Haywood PA-C           orphenadrine 100 MG 12 hr tablet   Commonly known as:  NORFLEX   Stopped by:  Nanette Haywood PA-C           pregabalin 25 MG capsule   Commonly known as:  LYRICA   Stopped by:  Nanette Haywood PA-C           pregabalin 50 MG capsule   Commonly known as:  LYRICA   Stopped by:  Nanette Haywood PA-C           tamsulosin 0.4 MG capsule   Commonly known as:  FLOMAX   Stopped by:  Nanette Haywood PA-C                Where to get your medicines      These medications were sent to [id number] - [place], [place] - [address]  [address] [address]     Phone:  [phone]     buPROPion 150 MG 12 hr tablet         Some of these will need a paper prescription and others can be bought over the counter.  Ask your nurse if you have questions.     Bring a paper prescription for each of these medications     clonazePAM 1 MG tablet    nortriptyline 10 MG capsule                Primary Care Provider Office Phone # Fax #    JASON Rice [phone]        [facility] [place] [address] [address]        Equal Access to Services     [facility] Center[facility] : John yeh Sosarah, waaxda luqadaha, qaybta kaalmada adeegyada, stephanie paredes. So [facility] [phone].    ATENCIÓN: Si habla español, tiene a carrington disposición servicios gratuitos de asistencia lingüística. Llame al [phone].    We comply with applicable federal civil  rights laws and [place] laws. We do not discriminate on the basis of race, color, national origin, age, disability sex, sexual orientation or gender identity.            Thank you!     Thank you for choosing [facility]  for your care. Our goal is always to provide you with excellent care. Hearing back from our patients is one way we can continue to improve our services. Please take a few minutes to complete the written survey that you may receive in the mail after your visit with us. Thank you!             Your Updated Medication List - Protect others around you: Learn how to safely use, store and throw away your medicines at www.disposemymeds.org.          This list is accurate as of: 7/28/17 10:29 AM.  Always use your most recent med list.                   Brand Name Dispense Instructions for use Diagnosis    buPROPion 150 MG 12 hr tablet    WELLBUTRIN SR    180 tablet    Take 1 tablet (150 mg) by mouth 2 times daily    Anxiety, Tobacco abuse       clonazePAM 1 MG tablet    klonoPIN    15 tablet    Take 0.5-1 tablets (0.5-1 mg) by mouth 2 times daily as needed for anxiety    Anxiety       cyclobenzaprine 5 MG tablet    FLEXERIL     Take 5 mg by mouth Reported on 5/19/2017        folic acid 1 MG tablet    FOLVITE     Reported on 5/19/2017        * HUMIRA 40 MG/0.8ML prefilled syringe kit   Generic drug:  adalimumab      Inject 40 mg Subcutaneous Reported on 5/19/2017        * HUMIRA PEN 40 MG/0.8ML pen kit   Generic drug:  adalimumab           IBUPROFEN PO      Take 800 mg by mouth every 6 hours as needed for moderate pain Reported on 5/19/2017        methotrexate 2.5 MG tablet CHEMO      6 tablets Reported on 4/3/2017        nortriptyline 10 MG capsule    PAMELOR    180 capsule    3 tabs at bedtime. If chronic pain continues, may continue increasing dose 1 additional tab at bedtime every 3 nights until gets to 50 mg and we can switch to 25 mg tabs.    Anxiety, Other chronic pain       *  oxyCODONE 5 MG capsule    OXY-IR     Take 5 mg by mouth every 6 hours as needed for moderate to severe pain Reported on 4/3/2017        * oxyCODONE 10 MG IR tablet    ROXICODONE    6 tablet    Take 1 tablet (10 mg) by mouth every 8 hours as needed for moderate to severe pain    Chronic back pain, unspecified back location, unspecified back pain laterality       * oxyCODONE 5 MG IR tablet    ROXICODONE    15 tablet    May take one tablet every 6 hours max of 3 tabs per day and will allow #15 tablets per month. Must last 30 days. OK to fill on/after 7/21 and begin on/after 7/23/2017    Lumbosacral spondylosis without myelopathy, DDD (degenerative disc disease), lumbar, Chronic pain syndrome       * Notice:  This list has 5 medication(s) that are the same as other medications prescribed for you. Read the directions carefully, and ask your doctor or other care provider to review them with you.

## 2017-07-29 ASSESSMENT — ANXIETY QUESTIONNAIRES: GAD7 TOTAL SCORE: 8

## 2017-07-31 ENCOUNTER — TELEPHONE (OUTPATIENT)
Dept: PALLIATIVE MEDICINE | Facility: CLINIC | Age: 52
End: 2017-07-31

## 2017-08-08 ENCOUNTER — OFFICE VISIT (OUTPATIENT)
Dept: PALLIATIVE MEDICINE | Facility: CLINIC | Age: 52
End: 2017-08-08
Payer: COMMERCIAL

## 2017-08-08 ENCOUNTER — RADIANT APPOINTMENT (OUTPATIENT)
Dept: GENERAL RADIOLOGY | Facility: CLINIC | Age: 52
End: 2017-08-08
Attending: NURSE PRACTITIONER
Payer: COMMERCIAL

## 2017-08-08 VITALS — DIASTOLIC BLOOD PRESSURE: 99 MMHG | HEART RATE: 97 BPM | SYSTOLIC BLOOD PRESSURE: 146 MMHG

## 2017-08-08 DIAGNOSIS — M54.50 CHRONIC BILATERAL LOW BACK PAIN WITHOUT SCIATICA: ICD-10-CM

## 2017-08-08 DIAGNOSIS — G89.29 CHRONIC PAIN OF LEFT KNEE: ICD-10-CM

## 2017-08-08 DIAGNOSIS — G89.29 CHRONIC BILATERAL LOW BACK PAIN WITHOUT SCIATICA: ICD-10-CM

## 2017-08-08 DIAGNOSIS — M25.562 CHRONIC PAIN OF LEFT KNEE: ICD-10-CM

## 2017-08-08 DIAGNOSIS — M25.562 CHRONIC PAIN OF LEFT KNEE: Primary | ICD-10-CM

## 2017-08-08 DIAGNOSIS — G89.29 CHRONIC PAIN OF LEFT KNEE: Primary | ICD-10-CM

## 2017-08-08 PROCEDURE — 73562 X-RAY EXAM OF KNEE 3: CPT

## 2017-08-08 PROCEDURE — 99214 OFFICE O/P EST MOD 30 MIN: CPT | Performed by: NURSE PRACTITIONER

## 2017-08-08 RX ORDER — TOPIRAMATE 25 MG/1
TABLET, FILM COATED ORAL
Qty: 120 TABLET | Refills: 0 | Status: SHIPPED | OUTPATIENT
Start: 2017-08-08 | End: 2018-01-10

## 2017-08-08 RX ORDER — OXYCODONE HYDROCHLORIDE 5 MG/1
TABLET ORAL
Qty: 45 TABLET | Refills: 0 | Status: SHIPPED | OUTPATIENT
Start: 2017-08-08 | End: 2017-09-05

## 2017-08-08 ASSESSMENT — PAIN SCALES - GENERAL: PAINLEVEL: WORST PAIN (10)

## 2017-08-08 NOTE — PROGRESS NOTES
"[facility]    8/8/2017    Chief complaint: left knee pain and low back pain    Interval history:  Jailene Breen is a 51 year old male is known to me for   Lumbar spondylosis, pain is worse with extension and rotation indicating a facetogenic component to pain  Lumbar degenerative disc disease  SI joint pain  Ankylosing spondylitis  Myofascial pain  Chronic pain syndrome  PMHx includes: Panic attacks, back pain, arthritis, anxiety, ankylosing spondylitis  PSHx includes: Right knee surgery ×2, left foot surgery right knee arthroscopy        Recommendations/plan at the last visit on 6/6/2017 included:  1. Physical Therapy:  On hold for now  2. Clinical Health Psychologist: The patient will follow up with Padilla Keene as scheduled to address issues of relaxation, behavioral change, coping style, and other factors important to improvement.    3. Diagnostic Studies:  none  4. Medication Management:    1. Increase Lyrica to 25mg in the morning and 50mg at bedtime for 7 days, then 50mg twice daily  2. Refill for oxycodone for post-procedural pain  5. Further procedures recommended: none  6. It will take 6-8 weeks of non-weight bearing to heal the bone contusion in the left knee  7. It will take 3-6 weeks to get improvement from the lumbar RFA  8. Patient referral to [facility] physical therapy for the left knee pain  9. Recommendations to PCP: see above  10. Follow up: 8-10 weeks      Since his last visit, Jailene Breen reports:  -he is very frustrated as he has ongoing left knee joint pain, he is convinced that there is \"something wrong\" in the knee. He is having lateral knee pain that shoots down his lateral left leg. The pain is sharp. He feels he cannot work. He is really struggling with pain.   Did not follow up with ortho as he did not understand that they wished to see him back.   His mood really deteriorates when he is not able to run and exercise as he prefers to do.       At this point, " "the patient's participation with our multidisciplinary team includes:  The patient has been compliant with the program.  Pain Group - not ordered  PT - has seen [facility] - seeing Padilla Keene       Pain scores:  Pain intensity on average is 9 on a scale of 0-10.    Range is 8-1010.   Pain right now is 10/10.  Pain is described as \"throbbing, sharp, miserable.\"    Pain is constant in nature    Current pain relevant medications:   Lyrica was not helpful and too expensive  -nortriptyline 10-30mg at bedtime (30mg at bedtime, helpful)  -humira 40mg twice monthly(helpful)  -ibuprofen 800mg TID PRN (using 3 times daily-helpful)  -Tylenol 500mg using 1000mg TID (unsure if helpful)  -Maxalt 10mg PRN migraine (helpful for migraine--he does have visual aura)  Oxycodone PRN (taking 1-2 tabs per day)      Other pertinent medications:  none    Previous Medications:  OPIATES: Oxycodone (helpful), Fentanyl (100mcg/hr patch helpful), hydrocodone (itching), Tramadol (not helpful)   NSAIDS: ibuprofen (not helpful), Aleve (not helpful)  MUSCLE RELAXANTS: methocarbamol (somewhat helpful), Flexeril (somewhat helpful), tizanidine (unsure if helpful), metaxalone (unsure), SOMA (helpful)  ANTI-MIGRAINE MEDS: Maxalt (helpful for migraine), Imitrex injection (somewhat helpful)  ANTI-DEPRESSANTS: Prozac (helpful), Cymbalta (felt weird on it), nortriptyline (unsure if helpful), Buspar (unsure), Remeron (blacked out)  SLEEP AIDS: Ambien (helpful)  ANTI-CONVULSANTS: gabapentin (felt odd on this medication, unsure of dose), Lyrica (not helpful for 4 months, unsure of dose)  TOPICALS: Lidocaine patches (not helpful), Voltaren gel (not helpful)  Other meds: Tylenol (unsure if helpful)        Other treatments have included:  Jailene Breen has been seen at a pain clinic in the past.  [facility]  PT: tried, not helpful  Chiropractic: tried, not helpful  Acupuncture: none  TENs Unit: tried, " helpful     Injections:   -has had injections at outside clinics  -has had epidural injections for low back pain (one was helpful)  -he has had lumbar medial branch blocks at [facility] and he was going to have lumbar radiofrequency ablation (did not do this due to cost)          Side Effects: no side effect  Patient is using the medication as prescribed: YES    Medications:  Current Outpatient Prescriptions   Medication Sig Dispense Refill     topiramate (TOPAMAX) 25 MG tablet Take 25mg at bedtime for 7 days, then 25mg twice daily for 7 days, then take 25mg in the morning and 50mg at bedtime for 7 days, then take 50mg twice daily. If side effects, then reduce to last tolerable dosage. 120 tablet 0     oxyCODONE (ROXICODONE) 5 MG IR tablet Take 1 tablet every 6-8 hours as needed for pain, max of 2 tabs per day but cannot take 2 tabs every day. OK to fill and begin on 8/8/2017 and to last 30 days until 9/7/2017 45 tablet 0     clonazePAM (KLONOPIN) 1 MG tablet Take 0.5-1 tablets (0.5-1 mg) by mouth 2 times daily as needed for anxiety 15 tablet 0     buPROPion (WELLBUTRIN SR) 150 MG 12 hr tablet Take 1 tablet (150 mg) by mouth 2 times daily 180 tablet 1     nortriptyline (PAMELOR) 50 MG capsule Take 1 capsule (50 mg) by mouth At Bedtime 90 capsule 1     ketoconazole (NIZORAL) 2 % shampoo Apply to the affected area and wash off after 5 minutes.use until skin condition is better 120 mL 1     HUMIRA PEN 40 MG/0.8ML pen kit        methotrexate 2.5 MG tablet 6 tablets Reported on 4/3/2017       IBUPROFEN PO Take 800 mg by mouth every 6 hours as needed for moderate pain Reported on 5/19/2017       oxyCODONE (ROXICODONE) 5 MG IR tablet May take one tablet every 6 hours max of 3 tabs per day and will allow #15 tablets per month. Must last 30 days. OK to fill on/after 7/21 and begin on/after 7/23/2017 15 tablet 0     folic acid (FOLVITE) 1 MG tablet Reported on 5/19/2017         Medical History: any changes in medical  history since they were last seen? No    Social History:   Home situation: , has 2 children in college  Occupation/Schooling: currently unemployed, worked as a  (unemployed since 3/1/2017)  Tobacco use: none  Alcohol use: none  Drug use: none  History of chemical dependency treatment: none    Review of Systems:  ROS is positive as per HPI as well as for weight gain, tinnitus, arthritis, back pain, joint pain and is negative for fevers, chills, sweats, or constipation, diarrhea.    Physical Exam:  Vital signs: Blood pressure (!) 146/99, pulse 97.    Behavioral observations:  Awake, alert, cooperative. Very frustrated. Slightly teary.    Gait:  normal    Musculoskeletal exam:    Moves well in the exam room  Strength grossly equal throughout    Neuro exam:  SILT in all extremities     Skin/vascular/autonomic:  No suspicious lesions on exposed skin.      Other:  NA      [place] Prescription Monitoring Program:  Reveiwed    DIRE Score for selecting candidates for long term opioid analgesia for chronic pain:  Diagnosis  2  Intractablility  2  Risk    Psychological health  2    Chemical health  2    Reliability  2    Social support  2  Efficacy  2    Total DIRE Score = 14. Note that  7-13 predicts poor outcome (compliance and efficacy) from opioid prescribing; 14-21 predicts good outcome (compliance and efficacy)  from opioid prescribing.      Assessment:   1. Chronic pain of the left knee is the most bothersome issue by far today.  2. Lumbar spondylosis, pain is worse with extension and rotation indicating a facetogenic component to pain  3. Lumbar degenerative disc disease  4. Chronic pain syndrome  5. PMHx includes: Panic attacks, back pain, arthritis, anxiety, ankylosing spondylitis  6. PSHx includes: Right knee surgery ×2, left foot surgery right knee arthroscopy      Plan:  1. Patient is to schedule a follow up appt to see Dr. Josias Levine of non-surgical ortho next week in follow up re: his ongoing  left knee joint pain  2. Physical Therapy:  I agree with left knee physical therapy  3. Clinical Health Psychologist: The patient will schedule a follow up with Padilla Keene  to address issues of relaxation, behavioral change, coping style, and other factors important to improvement.    4. Diagnostic Studies:  none  5. Medication Management:    1. Start Topamax as directed. Stay well hydrated and no alcohol with this medication  2. May us max of 2 tabs per day of the oxycodone but he cannot use 2 tablets every single day  6. Further procedures recommended: none  7. Obtained left knee x-rays today  8. Recommendations to PCP: see above  9. Follow up: 8-12 weeks    Total time spent face to face was 35 minutes and more than 50% of face to face time was spent in counseling and/or coordination of care regarding the diagnosis and recommendations above.      Susana GRANT, RN CNP, FNP  [facility]

## 2017-08-08 NOTE — PATIENT INSTRUCTIONS
PLAN:  1. Schedule follow up with Dr. Josias Levine next week re: ongoing left knee pain  2. Schedule follow up with Padilla Keene in our HEALTH PSYCHOLOGY department   3. I agree with left knee PHYSICAL THERAPY   4. Medications:   1. Start Topamax as directed, stay well hydrated with water and no alcohol with this medication  2. May use max of 2 tabs per day of oxycodone but can't use 2 tabs every day  5. Procedures: none  6. Obtained left knee x-rays today  7. Follow-up with me in 8-12 weeks.       ----------------------------------------------------------------  Nurse Triage line:  [phone]   Call this number with any questions or concerns. You may leave a detailed message anytime. Calls are typically returned Monday through Friday between 8 AM and 4:30 PM. We usually get back to you within 2 business days depending on the issue/request.       Medication refills:    For non-narcotic medications, call your pharmacy directly to request a refill. The pharmacy will contact the [facility] for authorization. Please allow 3-4 days for these refills to be processed.     For narcotic refills, call the nurse triage line or send a [employer] message. Please contact us 7-10 days before your refill is due. The message MUST include the name of the specific medication(s) requested and how you would like to receive the prescription(s). The options are as follows:    Pain Clinic staff can mail the prescription to your pharmacy. Please tell us the name of the pharmacy.    You may pick the prescription up at the Pain Clinic (tell us the location) or during a clinic visit with your pain provider    Pain Clinic staff can deliver the prescription to the [facility] pharmacy in the clinic building. Please tell us the location.      Scheduling number: [phone].  Call this number to schedule or change appointments.    We believe regular attendance is key to your success in our program.    Any time you are unable to  keep your appointment we ask that you call us at least 24 hours in advance to let us know. This will allow us to offer the appointment time to another patient.

## 2017-08-08 NOTE — MR AVS SNAPSHOT
After Visit Summary   8/8/2017    Jailene Breen    MRN: [id number]           Patient Information     Date Of Birth          1965        Visit Information        Provider Department      8/8/2017 3:30 PM Susana Pike APRN [facility]        Today's Diagnoses     Chronic pain of left knee    -  1    Chronic bilateral low back pain without sciatica          Care Instructions    PLAN:  1. Schedule follow up with Dr. Josias Levine next week re: ongoing left knee pain  2. Schedule follow up with Padilla Keene in our HEALTH PSYCHOLOGY department   3. I agree with left knee PHYSICAL THERAPY   4. Medications:   1. Start Topamax as directed, stay well hydrated with water and no alcohol with this medication  2. May use max of 2 tabs per day of oxycodone but can't use 2 tabs every day  5. Procedures: none  6. Obtained left knee x-rays today  7. Follow-up with me in 8-12 weeks.       ----------------------------------------------------------------  Nurse Triage line:  [phone]   Call this number with any questions or concerns. You may leave a detailed message anytime. Calls are typically returned Monday through Friday between 8 AM and 4:30 PM. We usually get back to you within 2 business days depending on the issue/request.       Medication refills:    For non-narcotic medications, call your pharmacy directly to request a refill. The pharmacy will contact the [facility] for authorization. Please allow 3-4 days for these refills to be processed.     For narcotic refills, call the nurse triage line or send a Portal Profes message. Please contact us 7-10 days before your refill is due. The message MUST include the name of the specific medication(s) requested and how you would like to receive the prescription(s). The options are as follows:    Pain Clinic staff can mail the prescription to your pharmacy. Please tell us the name of the pharmacy.    You may pick the  prescription up at the Pain Clinic (tell us the location) or during a clinic visit with your pain provider    Pain Clinic staff can deliver the prescription to the [facility] pharmacy in the clinic building. Please tell us the location.      Scheduling number: [phone].  Call this number to schedule or change appointments.    We believe regular attendance is key to your success in our program.    Any time you are unable to keep your appointment we ask that you call us at least 24 hours in advance to let us know. This will allow us to offer the appointment time to another patient.               Follow-ups after your visit        Your next 10 appointments already scheduled     Aug 08, 2017  3:55 PM CDT   XR KNEE LEFT 3 VIEWS with [id number]   [facility] [place] ([facility] [place])    [address]  [place] [address] [phone]           Please bring a list of your current medicines to your exam. (Include vitamins, minerals and over-thecounter medicines.) Leave your valuables at home.  Tell your doctor if there is a chance you may be pregnant.  You do not need to do anything special for this exam.              Who to contact     If you have questions or need follow up information about today's clinic visit or your schedule please contact [facility] [place] directly at [phone].  Normal or non-critical lab and imaging results will be communicated to you by MyChart, letter or phone within 4 business days after the clinic has received the results. If you do not hear from us within 7 days, please contact the clinic through MyChart or phone. If you have a critical or abnormal lab result, we will notify you by phone as soon as possible.  Submit refill requests through appCREAR or call your pharmacy and they will forward the refill request to us. Please allow 3 business days for your refill to be completed.          Additional Information About Your  Visit        Radcom Information     Radcom gives you secure access to your electronic health record. If you see a primary care provider, you can also send messages to your care team and make appointments. If you have questions, please call your primary care clinic.  If you do not have a primary care provider, please call [phone] and they will assist you.        Care EveryWhere ID     This is your Care EveryWhere ID. This could be used by other organizations to access your [place] medical records  [id number]        Your Vitals Were     Pulse                   97            Blood Pressure from Last 3 Encounters:   08/08/17 (!) 146/99   07/28/17 139/88   06/06/17 (!) 151/94    Weight from Last 3 Encounters:   07/28/17 109.3 kg (241 lb)   05/19/17 102.1 kg (225 lb)   05/17/17 103.2 kg (227 lb 9.6 oz)                 Today's Medication Changes          These changes are accurate as of: 8/8/17  3:53 PM.  If you have any questions, ask your nurse or doctor.               Start taking these medicines.        Dose/Directions    topiramate 25 MG tablet   Commonly known as:  TOPAMAX   Used for:  Chronic pain of left knee, Chronic bilateral low back pain without sciatica   Started by:  Susana Pike APRN CNP        Take 25mg at bedtime for 7 days, then 25mg twice daily for 7 days, then take 25mg in the morning and 50mg at bedtime for 7 days, then take 50mg twice daily. If side effects, then reduce to last tolerable dosage.   Quantity:  120 tablet   Refills:  0         These medicines have changed or have updated prescriptions.        Dose/Directions    * oxyCODONE 5 MG IR tablet   Commonly known as:  ROXICODONE   This may have changed:  Another medication with the same name was added. Make sure you understand how and when to take each.   Used for:  Lumbosacral spondylosis without myelopathy, DDD (degenerative disc disease), lumbar, Chronic pain syndrome   Changed by:  Susana Pike APRN CNP        May  take one tablet every 6 hours max of 3 tabs per day and will allow #15 tablets per month. Must last 30 days. OK to fill on/after 7/21 and begin on/after 7/23/2017   Quantity:  15 tablet   Refills:  0       * oxyCODONE 5 MG IR tablet   Commonly known as:  ROXICODONE   This may have changed:  You were already taking a medication with the same name, and this prescription was added. Make sure you understand how and when to take each.   Used for:  Chronic pain of left knee, Chronic bilateral low back pain without sciatica   Changed by:  Susana Pike, JUANITA CNP        Take 1 tablet every 6-8 hours as needed for pain, max of 2 tabs per day but cannot take 2 tabs every day. OK to fill and begin on 8/8/2017 and to last 30 days until 9/7/2017   Quantity:  45 tablet   Refills:  0       * Notice:  This list has 2 medication(s) that are the same as other medications prescribed for you. Read the directions carefully, and ask your doctor or other care provider to review them with you.         Where to get your medicines      Some of these will need a paper prescription and others can be bought over the counter.  Ask your nurse if you have questions.     Bring a paper prescription for each of these medications     oxyCODONE 5 MG IR tablet    topiramate 25 MG tablet                Primary Care Provider Office Phone # Fax #    JASON Rice [phone] [address] [address] [address]        Equal Access to Services     [facility]: Hadii kiran pearson hadasho Soomaali, waaxda luqadaha, qaybta kaalmada adeegyada, stephanie paredes. So [facility] [phone].    ATENCIÓN: Si habla español, tiene a carrington disposición servicios gratuitos de asistencia lingüística. Llame al [phone].    We comply with applicable federal civil rights laws and [place] laws. We do not discriminate on the basis of race, color, national origin, age, disability sex, sexual orientation or gender  identity.            Thank you!     Thank you for choosing [facility]  for your care. Our goal is always to provide you with excellent care. Hearing back from our patients is one way we can continue to improve our services. Please take a few minutes to complete the written survey that you may receive in the mail after your visit with us. Thank you!             Your Updated Medication List - Protect others around you: Learn how to safely use, store and throw away your medicines at www.disposemymeds.org.          This list is accurate as of: 8/8/17  3:53 PM.  Always use your most recent med list.                   Brand Name Dispense Instructions for use Diagnosis    buPROPion 150 MG 12 hr tablet    WELLBUTRIN SR    180 tablet    Take 1 tablet (150 mg) by mouth 2 times daily    Anxiety, Tobacco abuse       clonazePAM 1 MG tablet    klonoPIN    15 tablet    Take 0.5-1 tablets (0.5-1 mg) by mouth 2 times daily as needed for anxiety    Anxiety       folic acid 1 MG tablet    FOLVITE     Reported on 5/19/2017        HUMIRA PEN 40 MG/0.8ML pen kit   Generic drug:  adalimumab           IBUPROFEN PO      Take 800 mg by mouth every 6 hours as needed for moderate pain Reported on 5/19/2017        ketoconazole 2 % shampoo    NIZORAL    120 mL    Apply to the affected area and wash off after 5 minutes.use until skin condition is better    Seborrheic dermatitis       methotrexate 2.5 MG tablet CHEMO      6 tablets Reported on 4/3/2017        nortriptyline 50 MG capsule    PAMELOR    90 capsule    Take 1 capsule (50 mg) by mouth At Bedtime    Anxiety, Other chronic pain       * oxyCODONE 5 MG IR tablet    ROXICODONE    15 tablet    May take one tablet every 6 hours max of 3 tabs per day and will allow #15 tablets per month. Must last 30 days. OK to fill on/after 7/21 and begin on/after 7/23/2017    Lumbosacral spondylosis without myelopathy, DDD (degenerative disc disease), lumbar, Chronic pain syndrome       *  oxyCODONE 5 MG IR tablet    ROXICODONE    45 tablet    Take 1 tablet every 6-8 hours as needed for pain, max of 2 tabs per day but cannot take 2 tabs every day. OK to fill and begin on 8/8/2017 and to last 30 days until 9/7/2017    Chronic pain of left knee, Chronic bilateral low back pain without sciatica       topiramate 25 MG tablet    TOPAMAX    120 tablet    Take 25mg at bedtime for 7 days, then 25mg twice daily for 7 days, then take 25mg in the morning and 50mg at bedtime for 7 days, then take 50mg twice daily. If side effects, then reduce to last tolerable dosage.    Chronic pain of left knee, Chronic bilateral low back pain without sciatica       * Notice:  This list has 2 medication(s) that are the same as other medications prescribed for you. Read the directions carefully, and ask your doctor or other care provider to review them with you.

## 2017-08-15 ENCOUNTER — OFFICE VISIT (OUTPATIENT)
Dept: PALLIATIVE MEDICINE | Facility: CLINIC | Age: 52
End: 2017-08-15
Payer: COMMERCIAL

## 2017-08-15 DIAGNOSIS — G89.4 CHRONIC PAIN SYNDROME: Primary | ICD-10-CM

## 2017-08-15 PROCEDURE — 96152 HC HEALTH AND BEHAVIOR INTERVENTION, INDIVIDUAL, EACH 15 MINUTES: CPT | Performed by: PSYCHOLOGIST

## 2017-08-15 NOTE — MR AVS SNAPSHOT
After Visit Summary   8/15/2017    Jailene Breen    MRN: [id number]           Patient Information     Date Of Birth          1965        Visit Information        Provider Department      8/15/2017 2:00 PM [facility]        Today's Diagnoses     Chronic pain syndrome    -  1       Follow-ups after your visit        Your next 10 appointments already scheduled     Aug 18, 2017 10:00 AM CDT   Return Visit with Josias Danielson DO   [facility] [place] ([facility] [place])    [address]  [place] [address]   [phone]            Aug 28, 2017  9:30 AM CDT   ARANZA Extremity with Armando Tapia PT   [facility] [place] [facility] ([facility] [place])    [address]  [address]  [place] [address]   [phone]            Aug 29, 2017 11:00 AM CDT   Return Visit with Padilla Keene LP   [facility] [place] ([facility] [place])    [address]  [place] [address]   [phone]            Nov 03, 2017  9:00 AM CDT   Return Visit with JUANITA Ga CNP   [facility] [place] ([facility] [place])    [address]  [place] [address]   [phone]              Who to contact     If you have questions or need follow up information about today's clinic visit or your schedule please contact [facility] directly at [phone].  Normal or non-critical lab and imaging results will be communicated to you by MyChart, letter or phone within 4 business days after the clinic has received the results. If you do not hear from us within 7 days, please contact the clinic through MyChart or phone. If you have a critical or abnormal lab result, we will notify you by phone as soon as possible.  Submit refill requests through HubChilla or call your pharmacy and they will forward the refill request to  us. Please allow 3 business days for your refill to be completed.          Additional Information About Your Visit        MyChart Information     BitePalhart gives you secure access to your electronic health record. If you see a primary care provider, you can also send messages to your care team and make appointments. If you have questions, please call your primary care clinic.  If you do not have a primary care provider, please call [phone] and they will assist you.        Care EveryWhere ID     This is your Care EveryWhere ID. This could be used by other organizations to access your [place] medical records  [id number]         Blood Pressure from Last 3 Encounters:   08/08/17 (!) 146/99   07/28/17 139/88   06/06/17 (!) 151/94    Weight from Last 3 Encounters:   07/28/17 109.3 kg (241 lb)   05/19/17 102.1 kg (225 lb)   05/17/17 103.2 kg (227 lb 9.6 oz)              We Performed the Following     HEALTH & BEHAVIOR ASSESS, INITIAL, EA 15MIN PHD        Primary Care Provider Office Phone # Fax #    JASON Rice [phone] [address] [facility] [address]        Equal Access to Services     SARATH ESPINAL : Hadii kiran pearson hadasho Soomaali, waaxda luqadaha, qaybta kaalmada adeegyada, stephanie paredes. So [facility] [phone].    ATENCIÓN: Si habla español, tiene a carrington disposición servicios gratuitos de asistencia lingüística. Llame al [phone].    We comply with applicable federal civil rights laws and [place] laws. We do not discriminate on the basis of race, color, national origin, age, disability sex, sexual orientation or gender identity.            Thank you!     Thank you for choosing [facility]  for your care. Our goal is always to provide you with excellent care. Hearing back from our patients is one way we can continue to improve our services. Please take a few minutes to complete the written survey that you may receive in the  mail after your visit with us. Thank you!             Your Updated Medication List - Protect others around you: Learn how to safely use, store and throw away your medicines at www.disposemymeds.org.          This list is accurate as of: 8/15/17  2:39 PM.  Always use your most recent med list.                   Brand Name Dispense Instructions for use Diagnosis    buPROPion 150 MG 12 hr tablet    WELLBUTRIN SR    180 tablet    Take 1 tablet (150 mg) by mouth 2 times daily    Anxiety, Tobacco abuse       clonazePAM 1 MG tablet    klonoPIN    15 tablet    Take 0.5-1 tablets (0.5-1 mg) by mouth 2 times daily as needed for anxiety    Anxiety       folic acid 1 MG tablet    FOLVITE     Reported on 5/19/2017        HUMIRA PEN 40 MG/0.8ML pen kit   Generic drug:  adalimumab           IBUPROFEN PO      Take 800 mg by mouth every 6 hours as needed for moderate pain Reported on 5/19/2017        ketoconazole 2 % shampoo    NIZORAL    120 mL    Apply to the affected area and wash off after 5 minutes.use until skin condition is better    Seborrheic dermatitis       methotrexate 2.5 MG tablet CHEMO      6 tablets Reported on 4/3/2017        nortriptyline 50 MG capsule    PAMELOR    90 capsule    Take 1 capsule (50 mg) by mouth At Bedtime    Anxiety, Other chronic pain       * oxyCODONE 5 MG IR tablet    ROXICODONE    15 tablet    May take one tablet every 6 hours max of 3 tabs per day and will allow #15 tablets per month. Must last 30 days. OK to fill on/after 7/21 and begin on/after 7/23/2017    Lumbosacral spondylosis without myelopathy, DDD (degenerative disc disease), lumbar, Chronic pain syndrome       * oxyCODONE 5 MG IR tablet    ROXICODONE    45 tablet    Take 1 tablet every 6-8 hours as needed for pain, max of 2 tabs per day but cannot take 2 tabs every day. OK to fill and begin on 8/8/2017 and to last 30 days until 9/7/2017    Chronic pain of left knee, Chronic bilateral low back pain without sciatica       topiramate 25  MG tablet    TOPAMAX    120 tablet    Take 25mg at bedtime for 7 days, then 25mg twice daily for 7 days, then take 25mg in the morning and 50mg at bedtime for 7 days, then take 50mg twice daily. If side effects, then reduce to last tolerable dosage.    Chronic pain of left knee, Chronic bilateral low back pain without sciatica       * Notice:  This list has 2 medication(s) that are the same as other medications prescribed for you. Read the directions carefully, and ask your doctor or other care provider to review them with you.

## 2017-08-15 NOTE — PROGRESS NOTES
[facility]   [facility]  Behavioral Medicine Visit    Patient Name: Jailene Breen    YOB: 1965   Medical Record Number: [id number]  Date: 8/15/2017                SUBJECTIVE: The patient is seen on this date for an elective return appointment. He reports high frustration at the lack of improvement with his left knee pain. His last seen for services by this provider June 28, 2017. The patient appears to have been confused about his need to follow-up with his orthopedic specialist but is now scheduled to be seen this coming Friday. We spent time today talking about ways that the patient could make this pending visit as valuable as possible by putting questions down on paper that might address his concerns. Patient is encouraged to recall that he also can seek a second opinion if he does not feel as though he is understood or understands enough from his meeting with his present provider.    Patient reports he has some positive things going in his life including his enrollment in college. Though anxious he actually begins his course work in the next 2 weeks. He has not been working consistently but notes that being able to get funds to do his school has helped provide some relief for his needs at home. The patient responds well to empathy and acknowledgment without contradiction. He does respond as well to indirect suggestions such as the idea that he would prepare questions for his pending medical visit.    I have recommended that Les me more regularly again since he will be going through a number of transitions either with his health or with school or with employment and that our meeting may be supportive and helpful for him with those transitions. He has agreed to this plan.      OBJECTIVE:   Length of Visit: 60 minutes      Assessment: Current Emotional / Mental Status    Appearance:   Appropriate   Eye  Contact:   Good   Psychomotor Behavior: Normal   Attitude:   Cooperative  Frustrated   Orientation:   All  Speech   Rate / Production: Normal    Volume:  Loud   Mood:    Anxious  Irritable   Affect:    Appropriate   Thought Content:  Clear   Thought Form:  Coherent  Logical   Insight:    Fair     ASSESSMENT:   Axis I: Chronic pain syndrome  Axis II: Dx deferred    Progress toward goals: fair.   Frustrated, low tolerance, chronicity variables enhancing stress   Pain Status: unchanged    Emotional Status: worsened   Medication / chemical use concerns: None    PLAN:   Next Appointment: Jailene Breen will schedule a follow-up appointment in 2 weeks.  Assignment: See above  Objectives / interventions for next session: Follow-up    Padilla Keene MA, LP, [facility]  Pain Specialist  [facility] [place]

## 2017-08-22 ENCOUNTER — OFFICE VISIT (OUTPATIENT)
Dept: ORTHOPEDICS | Facility: CLINIC | Age: 52
End: 2017-08-22
Payer: COMMERCIAL

## 2017-08-22 VITALS
HEIGHT: 76 IN | DIASTOLIC BLOOD PRESSURE: 82 MMHG | SYSTOLIC BLOOD PRESSURE: 144 MMHG | BODY MASS INDEX: 29.35 KG/M2 | WEIGHT: 241 LBS

## 2017-08-22 DIAGNOSIS — M94.262 CHONDROMALACIA, KNEE, LEFT: ICD-10-CM

## 2017-08-22 DIAGNOSIS — M25.562 LEFT KNEE PAIN, UNSPECIFIED CHRONICITY: Primary | ICD-10-CM

## 2017-08-22 PROCEDURE — 20610 DRAIN/INJ JOINT/BURSA W/O US: CPT | Mod: LT | Performed by: PEDIATRICS

## 2017-08-22 PROCEDURE — 99214 OFFICE O/P EST MOD 30 MIN: CPT | Mod: 25 | Performed by: PEDIATRICS

## 2017-08-22 RX ORDER — TRIAMCINOLONE ACETONIDE 40 MG/ML
40 INJECTION, SUSPENSION INTRA-ARTICULAR; INTRAMUSCULAR ONCE
Qty: 1 ML | Refills: 0 | OUTPATIENT
Start: 2017-08-22 | End: 2017-08-22

## 2017-08-22 NOTE — Clinical Note
Jailene DOWNEY was seen in [facility] clinic for his ongoing left knee issues. Please see copy of the chart note for additional details. Thanks.

## 2017-08-22 NOTE — PROGRESS NOTES
Sports Medicine Clinic Visit    PCP: Aiden Resendez Jamshid is a 51 year old male who is seen in f/u up for Left knee pain, unspecified chronicity. Since last visit on 5/19/2017 patient has continued to have knee pain.  He does not feel that PT has helped.  States he is frusterated that it is taking too long for his knee to improve.  Has undergone an MRI and more recently underwent new x rays.   Currently wearing a brace and using a cane for ambulation.  Has not been able to run, and this is very upsetting to him.      **  Pain at rest in the anterior knee. Aggravating the knee causes a shooting pain down the leg. Knee swells with aggravation.  Lifting increases pain.  Popping and cracking noted with pain.      Has been staying off his leg recently.  Has been walking and biking for exercising.  He tried running one day and pain returned instantly.  Patient has increased his weight due to a lack of exercise. Has tried Visco Supplementation in the right knee in past, prior to surgery.  Has tried glucosamine.      **  Still using cane. Intermittent left knee swelling. + noise at knee.    Review of Systems  All other systems reviewed and are negative unless noted above.    This document serves as a record of the services and decisions personally performed and made by Josias Danielson DO, CAQ. It was created on his behalf by Aguilar Gallego, a trained medical scribe. The creation of this document is based the provider's statements to the medical scribe.  Aguilar Gallego August 22, 2017 9:09 AM       Past Medical History:   Diagnosis Date     Ankylosing spondylitis (H) 3/1/2017     Anxiety      Arthritis     back, knees     Back pain     possible drug seeking behavior in the past.      Panic attacks      Past Surgical History:   Procedure Laterality Date     ARTHROSCOPY KNEE Right 9/22/2016    Procedure: ARTHROSCOPY KNEE;  Surgeon: Fredo Hughes MD;  Location: [facility] OR     INJECT  "PARAVERTEBRAL FACET JOINT LUMBAR / SACRAL FIRST Right 11/25/2016    Procedure: INJECT PARAVERTEBRAL FACET JOINT LUMBAR / SACRAL FIRST;  Surgeon: Doretha Magallanes MD;  Location:  OR     ORTHOPEDIC SURGERY      Rt knee X 2     ORTHOPEDIC SURGERY      Lt foot       Objective  /82  Ht 6' 4\" (1.93 m)  Wt 241 lb (109.3 kg)  BMI 29.34 kg/m2    GENERAL APPEARANCE: healthy, alert and no distress   GAIT: antalgic and cane  SKIN: no suspicious lesions or rashes  NEURO: Normal strength and tone, mentation intact and speech normal  PSYCH:  mentation appears normal and affect normal/bright  HEENT: no scleral icterus  CV: no extremity edema   RESP: nonlabored breathing      Exam  Left Knee exam    ROM:        Flexion full, mild increase in pain        Extension full, no change in pain     Inspection:       no visible ecchymosis       trace effusion    Skin:       no visible deformities       well perfused       capillary refill brisk    Patellar Motion:        Crepitus noted in the patellofemoral joint bilaterally        anterior knee pain with patellar translation     Special Tests:        positive (+) Navdeep for pain in anterior knee  No laxity with varus, valgus      Radiology  Reviewed plain films knees 8/8/17; mild degenerative change.  XR Knee Standing AP Bilat Kinta Bilat Lat Left    Narrative    KNEE STANDING AP, BILATERAL SUNRISE, BILATERAL LATERAL LEFT   8/8/2017  4:07 PM     HISTORY: Chronic left knee pain.    COMPARISON: None.      Impression    IMPRESSION:   1. Right knee two views: Mild osteoarthritis in the medial knee joint  compartment and patellofemoral joint.  2. Left knee three views: Mild osteoarthritis medial knee joint  compartment and patellofemoral joint. No effusion.    FIDE PATIÑO MD       Visualized MRI of the Left Knee from 5/25/2017, and reviewed images and report with patient.  MRI demonstrates bone marrow edema, also medial and patellofemoral compartment chondromalacia.    Report " Reviewed:   MR KNEE LEFT WITHOUT CONTRAST   5/25/2017 10:07 AM     HISTORY: Left knee pain.     COMPARISON: Radiographs on 5/17/2017.     TECHNIQUE: Transverse and coronal T2 with fat suppression. Coronal T1.  Sagittal proton density and T2.     FINDINGS:      Medial Meniscus: There is mild myxoid degeneration in the posterior  horn with no tear demonstrated.      Lateral Meniscus: No tear, displaced fragment, or extrusion.       Anterior Cruciate Ligament: Unremarkable.      Posterior Cruciate Ligament: Unremarkable.      Medial Collateral Ligament: Unremarkable.     Lateral Collateral Ligament Complex, Popliteus Tendon: The iliotibial  band, fibular collateral ligament, biceps femoris tendon, and  popliteus tendon are unremarkable.     Osseous and Cartilaginous Structures: No fracture or osseous lesion is  demonstrated. There is moderate marrow edema seen within the anterior  aspect of the lateral femoral condyle, predominantly in the epiphysis  but also extending into the metaphysis. No other abnormal marrow  signal intensity is seen. There is grade 2 chondromalacia throughout  the weightbearing portion of the medial femoral condyle and also in  the medial patellar facet. The remaining cartilage surfaces are well  preserved.      Extensor Mechanism: The quadriceps and patellar tendons are  unremarkable. The medial and lateral patellar retinacula appear  unremarkable.     Joint Space: No joint effusion. No definite loose bodies appreciated.     Additional Findings: There is a just over 1 cm diameter Baker's cyst.  No semimembranosus-tibial collateral ligament or pes anserine  bursitis. No adjacent soft tissue pathology is seen.         IMPRESSION:  1. Bone contusion of the anterior aspect of the lateral femoral  condyle. No fracture is demonstrated. Although this can be seen  following lateral patellar dislocations, there is no additional  evidence of a recent lateral patellar dislocation. Clinical  correlation is  recommended.  2. 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     DAVIS CASTORENA MD    KNEE STANDING AP, BILATERAL SUNRISE, BILATERAL LATERAL LEFT   8/8/2017  4:07 PM      HISTORY: Chronic left knee pain.     COMPARISON: None.         IMPRESSION:   1. Right knee two views: Mild osteoarthritis in the medial knee joint  compartment and patellofemoral joint.  2. Left knee three views: Mild osteoarthritis medial knee joint  compartment and patellofemoral joint. No effusion.     FIDE PATIÑO MD    Assessment:  1. Left knee pain, unspecified chronicity    2. Chondromalacia, knee, left    I think current issues are more related to underlying degenerative change.    Plan:  Discussed the assessment with the patient.    Pertinent imaging of the area reviewed with the patient.    Options:  *Symptom Treatment   *Activity Modification   *Rehab - return to formal PT   *Injection - Corticosteroid injection vs Visco Supplementation   *Imaging - MRI of the left knee (repeat)  *Referral to Surgeon     Topical Treatments: Ice or Heat prn   Over the counter medication: Patient's preferred OTC medication as directed on packaging.  Activity Modification: as discussed ; limit impact activities. He is anxious to get back into running. If symptoms resolve with injection, he may try gradual return to running, but ongoing impact activities may need to be limited due to degenerative change.  Rehab: Home Exercise Program as provided by physical therapist ; may consider return to PT pending course, will continue with HEP for now.  Steroid injection of the left knee was performed today in clinic. He was interested in injection. Advised that steroid injection is not long term management option, but ok to try once to assess response.  Icing for the next 1-2 days may be helpful for pain. Injection may take 10-14 days to see the full effect.    Discussed steroid injection, including risks, potential benefits, and  alternatives.  The patient expressed understanding.  Obtained verbal and written consent and the patient elected to proceed.  Procedure: A steroid injection was performed under aseptic technique at left knee using 1% plain Lidocaine and 40 mg of Kenalog. Bandage applied. This was well tolerated.      Advised patient to the risks of repeat injections   Consider repeat MRI (though I don't think will be required), return to formal PT or referral to surgeon if symptoms persist   Follow up: as needed   Questions answered. The patient indicates understanding of these issues and agrees with the plan.     Josias Danielson DO, SUAD        Disclaimer: This note consists of symbols derived from keyboarding, dictation and/or voice recognition software. As a result, there may be errors in the script that have gone undetected. Please consider this when interpreting information found in this chart.    The information in this document, created by the medical scribe for me, accurately reflects the services I personally performed and the decisions made by me. I have reviewed and approved this document for accuracy.   Josias Danielson DO, SUAD

## 2017-08-22 NOTE — MR AVS SNAPSHOT
After Visit Summary   8/22/2017    Jailene Breen    MRN: [id number]           Patient Information     Date Of Birth          1965        Visit Information        Provider Department      8/22/2017 9:00 AM Josias Danielson,  [facility] [place]        Today's Diagnoses     Left knee pain, unspecified chronicity    -  1    Chondromalacia, knee, left           Follow-ups after your visit        Follow-up notes from your care team     Return if symptoms worsen or fail to improve.      Your next 10 appointments already scheduled     Aug 28, 2017  9:30 AM CDT   ARANZA Extremity with Armando Tapia, PT   [facility] [place] [facility] ([facility] [place])    [address]  [place] [address]   [phone]            Aug 29, 2017 11:00 AM CDT   Return Visit with Padilla Keene LP   [facility] [place] ([facility] [place])    [address]  [place] [address]   [phone]            Nov 03, 2017  9:00 AM CDT   [facility] [place])    [address]  [place] [address]   [phone]              Who to contact     If you have questions or need follow up information about today's clinic visit or your schedule please contact [facility] [place] directly at [phone].  Normal or non-critical lab and imaging results will be communicated to you by MyChart, letter or phone within 4 business days after the clinic has received the results. If you do not hear from us within 7 days, please contact the clinic through MyChart or phone. If you have a critical or abnormal lab result, we will notify you by phone as soon as possible.  Submit refill requests through My Point...Exactly or call your pharmacy and they will forward the refill request to us. Please allow 3 business days for your refill to  "be completed.          Additional Information About Your Visit        [employer] Information     [employer] gives you secure access to your electronic health record. If you see a primary care provider, you can also send messages to your care team and make appointments. If you have questions, please call your primary care clinic.  If you do not have a primary care provider, please call [phone] and they will assist you.        Care EveryWhere ID     This is your Care EveryWhere ID. This could be used by other organizations to access your [facility] medical records  [id number]        Your Vitals Were     Height BMI (Body Mass Index)                6' 4\" (1.93 m) 29.34 kg/m2           Blood Pressure from Last 3 Encounters:   08/22/17 144/82   08/08/17 (!) 146/99   07/28/17 139/88    Weight from Last 3 Encounters:   08/22/17 241 lb (109.3 kg)   07/28/17 241 lb (109.3 kg)   05/19/17 225 lb (102.1 kg)              We Performed the Following     Kenalog 40 MG  []     Large Joint/Bursa injection and/or drainage (Shoulder, Knee)          Today's Medication Changes          These changes are accurate as of: 8/22/17 11:04 AM.  If you have any questions, ask your nurse or doctor.               Start taking these medicines.        Dose/Directions    triamcinolone acetonide 40 MG/ML injection   Commonly known as:  KENALOG   Used for:  Left knee pain, unspecified chronicity, Chondromalacia, knee, left   Started by:  Josias Danielson, DO        Dose:  40 mg   1 mL (40 mg) by INTRA-ARTICULAR route once for 1 dose   Quantity:  1 mL   Refills:  0            Where to get your medicines      Some of these will need a paper prescription and others can be bought over the counter.  Ask your nurse if you have questions.     You don't need a prescription for these medications     triamcinolone acetonide 40 MG/ML injection                Primary Care Provider Office Phone # Fax #    JASON Rice [phone] " [phone]       [address] [address]        Equal Access to Services     SRAATH ESPINAL : Hadii kiran pearson rao Sosarah, wafranda luqadaha, qaybta kaalmada shanta, stephanie dulcein hayaacandice beattylaz murray ryan paredes. So [facility] [phone].    ATENCIÓN: Si habla español, tiene a carrington disposición servicios gratuitos de asistencia lingüística. Torriame al [phone].    We comply with applicable federal civil rights laws and [place] laws. We do not discriminate on the basis of race, color, national origin, age, disability sex, sexual orientation or gender identity.            Thank you!     Thank you for choosing [facility] [place]  for your care. Our goal is always to provide you with excellent care. Hearing back from our patients is one way we can continue to improve our services. Please take a few minutes to complete the written survey that you may receive in the mail after your visit with us. Thank you!             Your Updated Medication List - Protect others around you: Learn how to safely use, store and throw away your medicines at www.disposemymeds.org.          This list is accurate as of: 8/22/17 11:04 AM.  Always use your most recent med list.                   Brand Name Dispense Instructions for use Diagnosis    buPROPion 150 MG 12 hr tablet    WELLBUTRIN SR    180 tablet    Take 1 tablet (150 mg) by mouth 2 times daily    Anxiety, Tobacco abuse       clonazePAM 1 MG tablet    klonoPIN    15 tablet    Take 0.5-1 tablets (0.5-1 mg) by mouth 2 times daily as needed for anxiety    Anxiety       folic acid 1 MG tablet    FOLVITE     Reported on 5/19/2017        HUMIRA PEN 40 MG/0.8ML pen kit   Generic drug:  adalimumab           IBUPROFEN PO      Take 800 mg by mouth every 6 hours as needed for moderate pain Reported on 5/19/2017        ketoconazole 2 % shampoo    NIZORAL    120 mL    Apply to the affected area and wash off after 5 minutes.use until skin condition is better     Seborrheic dermatitis       methotrexate 2.5 MG tablet CHEMO      6 tablets Reported on 4/3/2017        nortriptyline 50 MG capsule    PAMELOR    90 capsule    Take 1 capsule (50 mg) by mouth At Bedtime    Anxiety, Other chronic pain       * oxyCODONE 5 MG IR tablet    ROXICODONE    15 tablet    May take one tablet every 6 hours max of 3 tabs per day and will allow #15 tablets per month. Must last 30 days. OK to fill on/after 7/21 and begin on/after 7/23/2017    Lumbosacral spondylosis without myelopathy, DDD (degenerative disc disease), lumbar, Chronic pain syndrome       * oxyCODONE 5 MG IR tablet    ROXICODONE    45 tablet    Take 1 tablet every 6-8 hours as needed for pain, max of 2 tabs per day but cannot take 2 tabs every day. OK to fill and begin on 8/8/2017 and to last 30 days until 9/7/2017    Chronic pain of left knee, Chronic bilateral low back pain without sciatica       topiramate 25 MG tablet    TOPAMAX    120 tablet    Take 25mg at bedtime for 7 days, then 25mg twice daily for 7 days, then take 25mg in the morning and 50mg at bedtime for 7 days, then take 50mg twice daily. If side effects, then reduce to last tolerable dosage.    Chronic pain of left knee, Chronic bilateral low back pain without sciatica       triamcinolone acetonide 40 MG/ML injection    KENALOG    1 mL    1 mL (40 mg) by INTRA-ARTICULAR route once for 1 dose    Left knee pain, unspecified chronicity, Chondromalacia, knee, left       * Notice:  This list has 2 medication(s) that are the same as other medications prescribed for you. Read the directions carefully, and ask your doctor or other care provider to review them with you.

## 2017-08-23 ENCOUNTER — MYC MEDICAL ADVICE (OUTPATIENT)
Dept: FAMILY MEDICINE | Facility: CLINIC | Age: 52
End: 2017-08-23

## 2017-08-23 DIAGNOSIS — F41.9 ANXIETY: ICD-10-CM

## 2017-08-23 DIAGNOSIS — F41.0 PANIC ATTACK: Primary | ICD-10-CM

## 2017-08-28 RX ORDER — CLONAZEPAM 1 MG/1
0.5-1 TABLET ORAL 2 TIMES DAILY PRN
Qty: 15 TABLET | Refills: 0 | Status: SHIPPED | OUTPATIENT
Start: 2017-08-28 | End: 2017-10-20

## 2017-09-04 ENCOUNTER — MYC MEDICAL ADVICE (OUTPATIENT)
Dept: PALLIATIVE MEDICINE | Facility: CLINIC | Age: 52
End: 2017-09-04

## 2017-09-04 DIAGNOSIS — G89.29 CHRONIC PAIN OF LEFT KNEE: ICD-10-CM

## 2017-09-04 DIAGNOSIS — M54.50 CHRONIC BILATERAL LOW BACK PAIN WITHOUT SCIATICA: ICD-10-CM

## 2017-09-04 DIAGNOSIS — M25.562 CHRONIC PAIN OF LEFT KNEE: ICD-10-CM

## 2017-09-04 DIAGNOSIS — G89.29 CHRONIC BILATERAL LOW BACK PAIN WITHOUT SCIATICA: ICD-10-CM

## 2017-09-05 ENCOUNTER — OFFICE VISIT (OUTPATIENT)
Dept: PALLIATIVE MEDICINE | Facility: CLINIC | Age: 52
End: 2017-09-05
Payer: COMMERCIAL

## 2017-09-05 DIAGNOSIS — M51.369 DDD (DEGENERATIVE DISC DISEASE), LUMBAR: Primary | ICD-10-CM

## 2017-09-05 DIAGNOSIS — G89.4 CHRONIC PAIN SYNDROME: ICD-10-CM

## 2017-09-05 PROCEDURE — 96152 HC HEALTH AND BEHAVIOR INTERVENTION, INDIVIDUAL, EACH 15 MINUTES: CPT | Performed by: PSYCHOLOGIST

## 2017-09-05 RX ORDER — OXYCODONE HYDROCHLORIDE 5 MG/1
TABLET ORAL
Qty: 45 TABLET | Refills: 0 | Status: SHIPPED | OUTPATIENT
Start: 2017-09-05 | End: 2017-10-02

## 2017-09-05 NOTE — TELEPHONE ENCOUNTER
Received call from patient requesting refill(s) of oxyCODONE (ROXICODONE) 5 MG IR tablet     Last picked up from pharmacy on 8/8/2017    [facility] [place] - [address]  [address] [address] [address]  Phone: [phone] Fax: [phone]    Pt last seen by prescribing provider on 8/8/2017  Next appt scheduled for 11/3/2017    Last urine drug screen date 3/31/2017  Current opioid agreement on file (completed within the last year) Yes Date of opioid agreement: 4/6/2017    Processing (pick one and delete the others):      Pt will  in clinic at [place] location    Will route to nursing pool for review and preparation of prescription(s).     Ethel Tuttle, [facility]

## 2017-09-05 NOTE — TELEPHONE ENCOUNTER
Routed to the nursing pool and to the MA pool to process refill(s).    Cha Molina, RN-BSN  [facility]

## 2017-09-05 NOTE — MR AVS SNAPSHOT
After Visit Summary   9/5/2017    Jailene Breen    MRN: [id number]           Patient Information     Date Of Birth          1965        Visit Information        Provider Department      9/5/2017 2:00 PM Padilla Keene LP [facility]        Today's Diagnoses     DDD (degenerative disc disease), lumbar    -  1    Chronic pain syndrome           Follow-ups after your visit        Your next 10 appointments already scheduled     Nov 03, 2017  9:00 AM CDT   Return Visit with JUANITA Ga CNP   [facility] ([facility])    [address] [place] [address] [phone]              Who to contact     If you have questions or need follow up information about today's clinic visit or your schedule please contact [facility] directly at [phone].  Normal or non-critical lab and imaging results will be communicated to you by MyChart, letter or phone within 4 business days after the clinic has received the results. If you do not hear from us within 7 days, please contact the clinic through MyChart or phone. If you have a critical or abnormal lab result, we will notify you by phone as soon as possible.  Submit refill requests through [employer] or call your pharmacy and they will forward the refill request to us. Please allow 3 business days for your refill to be completed.          Additional Information About Your Visit        MyChart Information     [employer] gives you secure access to your electronic health record. If you see a primary care provider, you can also send messages to your care team and make appointments. If you have questions, please call your primary care clinic.  If you do not have a primary care provider, please call [phone] and they will assist you.        Care EveryWhere ID     This is your Care EveryWhere ID. This could be used by other organizations to access your [facility]  records  [id number]         Blood Pressure from Last 3 Encounters:   08/22/17 144/82   08/08/17 (!) 146/99   07/28/17 139/88    Weight from Last 3 Encounters:   08/22/17 109.3 kg (241 lb)   07/28/17 109.3 kg (241 lb)   05/19/17 102.1 kg (225 lb)              We Performed the Following     HEALTH & BEHAVIOR ASSESS, INITIAL, EA 15MIN PHD        Primary Care Provider Office Phone # Fax #    JASON Rice [phone] [address] [address]        Equal Access to Services     [facility]: Hadii kiran pearson hadasho Sosarah, waaxda luqadaha, qaybta kaalmada adelazyada, stephanie davis . So [facility] [phone].    ATENCIÓN: Si habla español, tiene a carrington disposición servicios gratuitos de asistencia lingüística. [facility] [phone].    We comply with applicable federal civil rights laws and [place] laws. We do not discriminate on the basis of race, color, national origin, age, disability sex, sexual orientation or gender identity.            Thank you!     Thank you for choosing [facility]  for your care. Our goal is always to provide you with excellent care. Hearing back from our patients is one way we can continue to improve our services. Please take a few minutes to complete the written survey that you may receive in the mail after your visit with us. Thank you!             Your Updated Medication List - Protect others around you: Learn how to safely use, store and throw away your medicines at www.disposemymeds.org.          This list is accurate as of: 9/5/17 11:59 PM.  Always use your most recent med list.                   Brand Name Dispense Instructions for use Diagnosis    buPROPion 150 MG 12 hr tablet    WELLBUTRIN SR    180 tablet    Take 1 tablet (150 mg) by mouth 2 times daily    Anxiety, Tobacco abuse       clonazePAM 1 MG tablet    klonoPIN    15 tablet    Take 0.5-1 tablets (0.5-1 mg) by mouth 2 times daily as needed for  anxiety    Anxiety       folic acid 1 MG tablet    FOLVITE     Reported on 5/19/2017        HUMIRA PEN 40 MG/0.8ML pen kit   Generic drug:  adalimumab           IBUPROFEN PO      Take 800 mg by mouth every 6 hours as needed for moderate pain Reported on 5/19/2017        ketoconazole 2 % shampoo    NIZORAL    120 mL    Apply to the affected area and wash off after 5 minutes.use until skin condition is better    Seborrheic dermatitis       methotrexate 2.5 MG tablet CHEMO      6 tablets Reported on 4/3/2017        nortriptyline 50 MG capsule    PAMELOR    90 capsule    Take 1 capsule (50 mg) by mouth At Bedtime    Anxiety, Other chronic pain       * oxyCODONE 5 MG IR tablet    ROXICODONE    15 tablet    May take one tablet every 6 hours max of 3 tabs per day and will allow #15 tablets per month. Must last 30 days. OK to fill on/after 7/21 and begin on/after 7/23/2017    Lumbosacral spondylosis without myelopathy, DDD (degenerative disc disease), lumbar, Chronic pain syndrome       * oxyCODONE 5 MG IR tablet    ROXICODONE    45 tablet    Take 1 tablet every 6-8 hours as needed for pain, max of 2 tabs per day but cannot take 2 tabs every day. OK to fill today and begin on 9/7/2017 and to last 30 days    Chronic pain of left knee, Chronic bilateral low back pain without sciatica       topiramate 25 MG tablet    TOPAMAX    120 tablet    Take 25mg at bedtime for 7 days, then 25mg twice daily for 7 days, then take 25mg in the morning and 50mg at bedtime for 7 days, then take 50mg twice daily. If side effects, then reduce to last tolerable dosage.    Chronic pain of left knee, Chronic bilateral low back pain without sciatica       * Notice:  This list has 2 medication(s) that are the same as other medications prescribed for you. Read the directions carefully, and ask your doctor or other care provider to review them with you.

## 2017-09-05 NOTE — PROGRESS NOTES
[facility]   [facility], [place]  Behavioral Medicine Visit    Patient Name: Jailene Breen    YOB: 1965   Medical Record Number: [id number]  Date: 9/5/2017                SUBJECTIVE: The patient on this date for an elective return appointment. Today he reports the following: His pain is the same his mood is mildly improved his activity level has mildly increased stress level is the same and his sleep level has been the same. Today less discusses his return to college after being away for many years. He describes considerable stress over the process. He reports that he isn't only able to remain for half of our allotted time. During that time he is encouraged to discuss specific concrete action steps he might take to help him continue to engage in successful stress management vis-à-vis his school and his pain. He agrees to several things including developing his date book to assist him with planning and making contact of the office of disabilities within his school to get assistance with his work.          OBJECTIVE:   Length of Visit: 30 minutes      Assessment: Current Emotional / Mental Status    Appearance:   Appropriate   Eye Contact:   Good   Psychomotor Behavior: Normal   Attitude:   Cooperative   Orientation:   All  Speech  Rate / Production:             Normal   Volume:              Normal   Mood:    Anxious  Depressed   Affect:    Expansive  Labile   Thought Content:  Clear   Thought Form:  Coherent  Logical   Insight:    Poor     ASSESSMENT:   Axis I: Pain Disorder   Chronic pain syndrome  Axis II: Dx deferred    Progress toward goals: fair.    Pain Status: unchanged    Emotional Status: improved              Medication / chemical use concerns: None    PLAN:   Next Appointment: Jailene Breen will schedule a follow-up appointment in 2-4 weeks.  Assignment: Take action steps discussed and visit  Objectives  / interventions for next session: Follow-up on goal setting established in today's visit    Padilla Keene MA, LP, [facility]  Pain Specialist  [facility] [place]

## 2017-09-05 NOTE — TELEPHONE ENCOUNTER
Signed Prescriptions:                        Disp   Refills    oxyCODONE (ROXICODONE) 5 MG IR tablet      45 tab*0        Sig: Take 1 tablet every 6-8 hours as needed for pain, max           of 2 tabs per day but cannot take 2 tabs every           day. OK to fill today and begin on 9/7/2017 and           to last 30 days  Authorizing Provider: TAMICA PETTY    Script given to Cha FLORES at [facility]    Tamica GRANT, RN CNP, FNP  [facility] [place]

## 2017-09-05 NOTE — TELEPHONE ENCOUNTER
Script signed and given to pt.    Cha Molina RN-BSN  [facility]

## 2017-09-05 NOTE — TELEPHONE ENCOUNTER
Medication refill information reviewed.     Last due:  OK to fill and begin on 8/8/2017 and to last 30 days until 9/7/2017    Due date:  9/7/17    Weaning instructions:  N/A    Prescriptions prepped for review.     Cha Molina RN-BSN  [facility]

## 2017-10-02 ENCOUNTER — MYC MEDICAL ADVICE (OUTPATIENT)
Dept: PALLIATIVE MEDICINE | Facility: CLINIC | Age: 52
End: 2017-10-02

## 2017-10-02 DIAGNOSIS — M25.562 CHRONIC PAIN OF LEFT KNEE: ICD-10-CM

## 2017-10-02 DIAGNOSIS — G89.29 CHRONIC PAIN OF LEFT KNEE: ICD-10-CM

## 2017-10-02 DIAGNOSIS — G89.29 CHRONIC BILATERAL LOW BACK PAIN WITHOUT SCIATICA: ICD-10-CM

## 2017-10-02 DIAGNOSIS — M54.50 CHRONIC BILATERAL LOW BACK PAIN WITHOUT SCIATICA: ICD-10-CM

## 2017-10-02 NOTE — TELEPHONE ENCOUNTER
Medication refill information reviewed.     Last UDS completed on 3/31/17    Due date for oxycodone 5 mg is 10/7/17     Prescription prepped for review.     Will route to provider.     LETHA Parker, RN-BC  Patient Care Supervisor/Care Coordinator  [facility] [place]

## 2017-10-02 NOTE — TELEPHONE ENCOUNTER
Routed to the nursing pool and to the MA pool to process refill(s).    Cha Molina, RN-BSN  [facility]

## 2017-10-02 NOTE — TELEPHONE ENCOUNTER
Patient requesting refill(s) of oxyCODONE (ROXICODONE) 5 MG IR tablet    Last picked up from pharmacy on 9/5/17    Pt last seen by prescribing provider on 8/8/17  Next appt scheduled for 11/3/17    Last urine drug screen date 11/8/16  Current opioid agreement on file (completed within the last year) Yes Date of opioid agreement: 4/13/17    Processing (pick one)    Pt will  in clinic at [facility] location    Will route to nursing pool for review and preparation of prescription(s).

## 2017-10-02 NOTE — TELEPHONE ENCOUNTER
Per patient myChart message:  From: Jailene Breen      Created: 10/2/2017 9:31 AM      Hi could you please refill my pain meds. I'm leaving out of town on my birthday the 6[birth year] so anytime before that would be great.

## 2017-10-03 RX ORDER — OXYCODONE HYDROCHLORIDE 5 MG/1
TABLET ORAL
Qty: 45 TABLET | Refills: 0 | Status: SHIPPED | OUTPATIENT
Start: 2017-10-03 | End: 2017-11-02

## 2017-10-03 NOTE — TELEPHONE ENCOUNTER
Script for oxycodone was signed and kept in file folder for patient to pick at [facility] 2nd floor. Patient notified.     Nicolasa Krueger MA

## 2017-10-03 NOTE — TELEPHONE ENCOUNTER
Signed Prescriptions:                        Disp   Refills    oxyCODONE (ROXICODONE) 5 MG IR tablet      45 tab*0        Sig: Take 1 tablet every 6-8 hours as needed for pain, max           of 2 tabs per day but cannot take 2 tabs every           day. OK to fill on/after 10/6 to start taking           on/after 10/7/2017; to last 30 days  Authorizing Provider: SUSANA PETTY    Signed script placed in touchdown bin at [facility].    Susana Petty APRN CNP FNP RN  [facility]

## 2017-10-04 ENCOUNTER — MYC MEDICAL ADVICE (OUTPATIENT)
Dept: PALLIATIVE MEDICINE | Facility: CLINIC | Age: 52
End: 2017-10-04

## 2017-10-04 ENCOUNTER — TELEPHONE (OUTPATIENT)
Dept: PALLIATIVE MEDICINE | Facility: CLINIC | Age: 52
End: 2017-10-04

## 2017-10-04 NOTE — TELEPHONE ENCOUNTER
Patient is requesting to have his recent refill of Oxycodone filled 10/5  As he will be going out of town.      Deyanira MOLINA    [facility] [place]

## 2017-10-04 NOTE — TELEPHONE ENCOUNTER
Pt has never requested an early fill before. Will sly this first time early fill by 1 day.     Called pharmacy and ok'd early fill.     Sondra Hearn RN, [facility]  Pain Clinic Care Coordinator

## 2017-10-04 NOTE — TELEPHONE ENCOUNTER
My chart also sent by pt:    Hi, I'm leaving early Friday morning to [place] for my birthday and was hoping you would ok [facility] pharmacy to fill on Thursday, that would help out a lot, thanks.     Need to review chart for other future fills.     Sondra Hearn RN, [facility]  Pain Clinic Care Coordinator

## 2017-10-20 ENCOUNTER — MYC MEDICAL ADVICE (OUTPATIENT)
Dept: FAMILY MEDICINE | Facility: CLINIC | Age: 52
End: 2017-10-20

## 2017-10-20 DIAGNOSIS — F41.9 ANXIETY: ICD-10-CM

## 2017-10-20 NOTE — TELEPHONE ENCOUNTER
Clonazepam-Routing refill request to provider for review/approval because:  Drug not on the [facility] refill protocol   Samantha Yun RN.

## 2017-10-21 RX ORDER — CLONAZEPAM 1 MG/1
0.5-1 TABLET ORAL 2 TIMES DAILY PRN
Qty: 10 TABLET | Refills: 0 | Status: SHIPPED | OUTPATIENT
Start: 2017-10-21 | End: 2017-11-17

## 2017-10-21 NOTE — TELEPHONE ENCOUNTER
REv'd  and recent charting. Patient doing well with compliance currently and last refill for benzo's in august.,    Ruperto Resendez PA-C

## 2017-11-01 ENCOUNTER — MYC MEDICAL ADVICE (OUTPATIENT)
Dept: PALLIATIVE MEDICINE | Facility: CLINIC | Age: 52
End: 2017-11-01

## 2017-11-01 DIAGNOSIS — G89.29 CHRONIC BILATERAL LOW BACK PAIN WITHOUT SCIATICA: ICD-10-CM

## 2017-11-01 DIAGNOSIS — M54.50 CHRONIC BILATERAL LOW BACK PAIN WITHOUT SCIATICA: ICD-10-CM

## 2017-11-01 DIAGNOSIS — M25.562 CHRONIC PAIN OF LEFT KNEE: ICD-10-CM

## 2017-11-01 DIAGNOSIS — G89.29 CHRONIC PAIN OF LEFT KNEE: ICD-10-CM

## 2017-11-01 NOTE — TELEPHONE ENCOUNTER
Sending to MA pool to prep refill.     Sondra Hearn RN, [facility]  Pain Clinic Care Coordinator

## 2017-11-01 NOTE — TELEPHONE ENCOUNTER
Patient requesting refill(s) of oxyCODONE (ROXICODONE) 5 MG IR tablet    Last picked up from pharmacy on 10/5/17    Pt last seen by prescribing provider on 8/15/17  Next appt scheduled for 12/12/17    Last urine drug screen date 3/31/17  Current opioid agreement on file (completed within the last year) Yes Date of opioid agreement: 4/13/17    Processing (pick one)   [employer] sent to pt:  Do you want us to mail the script or do you want to pick it up at the clinic?  Please let us know this information with every refill request you make.  MF   Response:  Pt to  script @ [facility].      Will route to nursing pool for review and preparation of prescription(s).

## 2017-11-01 NOTE — TELEPHONE ENCOUNTER
From: Jailene Breen  To: Susana Pike APRN CNP  Sent: 11/1/2017 11:07 AM CDT  Subject: Question about medications    Hello, could I get a refill on my pain medication. I'm not out, just giving a few days heads up. Thanks

## 2017-11-02 NOTE — TELEPHONE ENCOUNTER
Medication refill information reviewed.     Last due:  OK to fill on/after 10/6 to start taking on/after 10/7/2017; to last 30 days    Due date:  11/6/17    Weaning instructions:  N/A    Prescriptions prepped for review.     Cha Molina, RN-BSN  [facility]

## 2017-11-03 RX ORDER — OXYCODONE HYDROCHLORIDE 5 MG/1
TABLET ORAL
Qty: 45 TABLET | Refills: 0 | Status: SHIPPED | OUTPATIENT
Start: 2017-11-03 | End: 2017-12-04

## 2017-11-03 NOTE — TELEPHONE ENCOUNTER
Signed Prescriptions:                        Disp   Refills    oxyCODONE IR (ROXICODONE) 5 MG tablet      45 tab*0        Sig: Take 1 tablet every 6-8 hours as needed for pain, max           of 2 tabs per day but cannot take 2 tabs every           day. OK to fill on/after 11/4/17 to start taking           on/after 11/6/2017; to last 30 days  Authorizing Provider: SUSANA PETTY    Signed script placed in touchdown bin at [facility].    Susana Petty APRN CNP FNP RN  [facility]

## 2017-11-03 NOTE — TELEPHONE ENCOUNTER
Script was signed and kept in file folder for patient to pick at [facility] 2nd floor.       Nicolasa Krueger MA

## 2017-11-09 ENCOUNTER — MYC MEDICAL ADVICE (OUTPATIENT)
Dept: FAMILY MEDICINE | Facility: CLINIC | Age: 52
End: 2017-11-09

## 2017-11-17 ENCOUNTER — MYC MEDICAL ADVICE (OUTPATIENT)
Dept: FAMILY MEDICINE | Facility: CLINIC | Age: 52
End: 2017-11-17

## 2017-11-17 DIAGNOSIS — F41.9 ANXIETY: ICD-10-CM

## 2017-11-17 RX ORDER — CLONAZEPAM 1 MG/1
0.5-1 TABLET ORAL 2 TIMES DAILY PRN
Qty: 15 TABLET | Refills: 0 | Status: SHIPPED | OUTPATIENT
Start: 2017-11-17 | End: 2017-12-28

## 2017-11-20 NOTE — TELEPHONE ENCOUNTER
Written rx faxed to the pharmacy, Pharmacy will contact pt when medication is ready for pickup.  Stanley Solomon,  [facility]

## 2017-12-04 ENCOUNTER — MYC MEDICAL ADVICE (OUTPATIENT)
Dept: PALLIATIVE MEDICINE | Facility: CLINIC | Age: 52
End: 2017-12-04

## 2017-12-04 DIAGNOSIS — M25.562 CHRONIC PAIN OF LEFT KNEE: ICD-10-CM

## 2017-12-04 DIAGNOSIS — G89.29 CHRONIC PAIN OF LEFT KNEE: ICD-10-CM

## 2017-12-04 DIAGNOSIS — G89.29 CHRONIC BILATERAL LOW BACK PAIN WITHOUT SCIATICA: ICD-10-CM

## 2017-12-04 DIAGNOSIS — M54.50 CHRONIC BILATERAL LOW BACK PAIN WITHOUT SCIATICA: ICD-10-CM

## 2017-12-04 NOTE — TELEPHONE ENCOUNTER
My chart message from pt:      Hi could you please refill my pain medication, thank you.      Sending to MA pool to prep refill.     Sondra Hearn RN, [facility]  Pain Clinic Care Coordinator

## 2017-12-04 NOTE — TELEPHONE ENCOUNTER
Medication refill information reviewed.     Last due:  OK to fill on/after 11/4/17 to start taking on/after 11/6/2017; to last 30 days    Due date:  12/6/17    Weaning instructions:  N/A    Prescriptions prepped for review.     Cha Molina, RN-BSN  [facility]

## 2017-12-04 NOTE — TELEPHONE ENCOUNTER
Patient requesting refill(s) of oxyCODONE IR (ROXICODONE) 5 MG tablet     Last picked up from pharmacy on 11/4/17    Pt last seen by prescribing provider on 9/5/17  Next appt scheduled for 12/12/17    Last urine drug screen date 3/31/17  Current opioid agreement on file (completed within the last year) Yes Date of opioid agreement: 4/13/17    Processing (pick one)      Pt will  in clinic at [facility] 2nd floor.     Will route to nursing pool for review and preparation of prescription(s).

## 2017-12-05 ENCOUNTER — MYC MEDICAL ADVICE (OUTPATIENT)
Dept: PALLIATIVE MEDICINE | Facility: CLINIC | Age: 52
End: 2017-12-05

## 2017-12-05 RX ORDER — OXYCODONE HYDROCHLORIDE 5 MG/1
TABLET ORAL
Qty: 45 TABLET | Refills: 0 | Status: SHIPPED | OUTPATIENT
Start: 2017-12-05 | End: 2018-01-03

## 2017-12-05 NOTE — TELEPHONE ENCOUNTER
Script for oxycodone was signed and kept in file folder for patient to pick at [facility] 2nd floor. Patient notified.     Nicolasa Krueger MA

## 2017-12-05 NOTE — TELEPHONE ENCOUNTER
Per patient myChart message:  From: Jailene Breen      Created: 12/5/2017 8:37 AM      Hi, when the prescription is ready could you send it to the pharmacy in your building, thanks.        Pt already picked up the script today.    Cha Molina RN-BSN  [facility]

## 2017-12-05 NOTE — TELEPHONE ENCOUNTER
Signed Prescriptions:                        Disp   Refills    oxyCODONE IR (ROXICODONE) 5 MG tablet      45 tab*0        Sig: Take 1 tablet every 6-8 hours as needed for pain, max           of 2 tabs per day but cannot take 2 tabs every           day.  Okay to fill now.  To start on 12/6/17.  To           last 30 days  Authorizing Provider: SUSANA PETTY    Signed script placed in touchdown bin at [facility].    Susana Petty APRN CNP FNP RN  [facility]

## 2017-12-26 ENCOUNTER — MYC MEDICAL ADVICE (OUTPATIENT)
Dept: FAMILY MEDICINE | Facility: CLINIC | Age: 52
End: 2017-12-26

## 2017-12-26 DIAGNOSIS — F41.9 ANXIETY: ICD-10-CM

## 2017-12-28 RX ORDER — CLONAZEPAM 1 MG/1
0.5-1 TABLET ORAL 2 TIMES DAILY PRN
Qty: 15 TABLET | Refills: 0 | Status: SHIPPED | OUTPATIENT
Start: 2017-12-28 | End: 2017-12-28

## 2017-12-28 RX ORDER — CLONAZEPAM 1 MG/1
0.5-1 TABLET ORAL 2 TIMES DAILY PRN
Qty: 15 TABLET | Refills: 0 | Status: SHIPPED | OUTPATIENT
Start: 2017-12-28 | End: 2018-01-31

## 2018-01-02 ENCOUNTER — MYC MEDICAL ADVICE (OUTPATIENT)
Dept: PALLIATIVE MEDICINE | Facility: CLINIC | Age: 53
End: 2018-01-02

## 2018-01-02 DIAGNOSIS — M25.562 CHRONIC PAIN OF LEFT KNEE: ICD-10-CM

## 2018-01-02 DIAGNOSIS — M54.50 CHRONIC BILATERAL LOW BACK PAIN WITHOUT SCIATICA: ICD-10-CM

## 2018-01-02 DIAGNOSIS — G89.29 CHRONIC PAIN OF LEFT KNEE: ICD-10-CM

## 2018-01-02 DIAGNOSIS — G89.29 CHRONIC BILATERAL LOW BACK PAIN WITHOUT SCIATICA: ICD-10-CM

## 2018-01-02 NOTE — TELEPHONE ENCOUNTER
Routed to the nursing pool and to the MA pool to process refill(s).    Cha Molina, RN-BSN  [facility]

## 2018-01-03 ENCOUNTER — MYC MEDICAL ADVICE (OUTPATIENT)
Dept: PALLIATIVE MEDICINE | Facility: CLINIC | Age: 53
End: 2018-01-03

## 2018-01-03 RX ORDER — OXYCODONE HYDROCHLORIDE 5 MG/1
TABLET ORAL
Qty: 45 TABLET | Refills: 0 | Status: SHIPPED | OUTPATIENT
Start: 2018-01-03 | End: 2018-01-31

## 2018-01-03 NOTE — TELEPHONE ENCOUNTER
appointment with JUANITA Ramon CNP is 1/31/18.    Routed to JUANITA Ramon CNP to review and sign.    \Cha Molina RN-BSN  [facility]

## 2018-01-03 NOTE — TELEPHONE ENCOUNTER
Per patient myChart message:  From: Jailene Breen      Created: 1/3/2018 3:08 PM      Hi the appointment is set now. I swear we rescheduled one but either way it's set now for sure.

## 2018-01-03 NOTE — TELEPHONE ENCOUNTER
Received call from patient requesting refill(s) of oxyCODONE IR (ROXICODONE) 5 MG tablet    Last picked up from pharmacy on 12/05/17    Pt last seen by prescribing provider on 08/08/17  Next appt scheduled for ; None    Last urine drug screen date 03/31/17  Current opioid agreement on file (completed within the last year) Yes Date of opioid agreement: 04/06/17    Processing (pick one and delete the others):  [employer] message sent to patient  Mail to .... pharmacy   Pt will  in clinic at ..... location  Deliver Rx to .... pharmacy    Will route to nursing pool for review and preparation of prescription(s).

## 2018-01-03 NOTE — TELEPHONE ENCOUNTER
Last appointment 8/2017.    He has cancelled appts X2 in November and December.    Per patient [employer] message:  From: Cha Molina RN        Created: 1/3/2018 2:58 PM       Devante Dent.  Susana has not seen you in clinic since August.  You cancelled your last 2 appointments with Susana.    She cannot prescribe oxycodone for you unless you have an appointment on the books.  Once that is scheduled we will process your refill.  Please call the appointment line to schedule this appointment.  If you miss/cancel your next appointment will have wean you off of the oxycodone.    Cha Molina RN-BSN  [facility]        Medication refill information reviewed.     Last due:  Okay to fill now.  To start on 12/6/17.  To last 30 days    Due date:  1/5/18    Weaning instructions:  N/A    Prescriptions prepped for review.     Not sent to provider.  Waiting for pt to schedule clinic visit with JUANITA Ramon CNP.      Cha Molina RN-BSN  [facility]

## 2018-01-04 NOTE — TELEPHONE ENCOUNTER
My chart message from pt:    Hi, just checking to see if you got my last message about making the appointment.    Les    Yes we did and I do see it on the books. Make sure that you don't miss that appointment.     Sondra Hearn RN, [facility]  Pain Clinic Care Coordinator

## 2018-01-04 NOTE — TELEPHONE ENCOUNTER
Signed Prescriptions:                        Disp   Refills    oxyCODONE IR (ROXICODONE) 5 MG tablet      45 tab*0        Sig: Take 1 tablet every 6-8 hours as needed for pain, max           of 2 tabs per day but cannot take 2 tabs every           day.  Okay to fill now.  To start on 1/5/18.  To           last 30 days. If miss upcoming appt, this will be           the last script.  Authorizing Provider: SUSANA PETTY    If patient misses upcoming appt. This is the last script.     Signed script placed in locked in top drawer of trigger point cart at [facility]    Susana GRANT RN CNP, FNP  [facility] [place]

## 2018-01-10 ENCOUNTER — MYC REFILL (OUTPATIENT)
Dept: PALLIATIVE MEDICINE | Facility: CLINIC | Age: 53
End: 2018-01-10

## 2018-01-10 DIAGNOSIS — G89.29 CHRONIC PAIN OF LEFT KNEE: ICD-10-CM

## 2018-01-10 DIAGNOSIS — G89.29 CHRONIC BILATERAL LOW BACK PAIN WITHOUT SCIATICA: ICD-10-CM

## 2018-01-10 DIAGNOSIS — M54.50 CHRONIC BILATERAL LOW BACK PAIN WITHOUT SCIATICA: ICD-10-CM

## 2018-01-10 DIAGNOSIS — M25.562 CHRONIC PAIN OF LEFT KNEE: ICD-10-CM

## 2018-01-10 RX ORDER — TOPIRAMATE 25 MG/1
25 TABLET, FILM COATED ORAL AT BEDTIME
Qty: 30 TABLET | Refills: 0 | Status: SHIPPED | OUTPATIENT
Start: 2018-01-10 | End: 2018-01-31 | Stop reason: SINTOL

## 2018-01-10 NOTE — TELEPHONE ENCOUNTER
"[employer] sent to pt:  From: Cha Molina RN        Created: 1/10/2018 3:10 PM       Hi Les.  It looks like this was last prescribed 8/2017.  More information is needed before processing this refill for you:    How many pills/per day are you taking?  For example:  \"I am taking 2 tab twice daily\"    Are you having any side effects?   Do you feel that the medication is helping?    Once we hear back from you we will process the refill.    Cha Molina RN-BSN  [facility]        Waiting pt response back.    Cha Molina RN-BSN  [facility]  "

## 2018-01-10 NOTE — TELEPHONE ENCOUNTER
Signed Prescriptions:                        Disp   Refills    topiramate (TOPAMAX) 25 MG tablet          30 tab*0        Sig: Take 1 tablet (25 mg) by mouth At Bedtime  Authorizing Provider: TAMICA PETTY, RN CNP, FNP  [facility] [place]

## 2018-01-10 NOTE — TELEPHONE ENCOUNTER
Message from [place]:  Original authorizing provider: JUANITA Ga CNP would like a refill of the following medications:  topiramate (TOPAMAX) 25 MG tablet [JUANITA Ga CNP]    Preferred pharmacy: [id number]  [address]    Comment:  Can you please refill, thanks.

## 2018-01-10 NOTE — TELEPHONE ENCOUNTER
Per patient myChart message:  From: Jailene Breen      Created: 1/10/2018 3:13 PM      I wa taking one tablet at bedtime and no reactions. I have had fewer migraines being on this it seems.        Since pt has not seen provider since 8/8/17 a med increase can be discussed at that time.  Next appointment is 1/31/18.      Med prepped for JUANITA Ramon CNP to review and sign.    Cha Molina RN-BSN  [facility]

## 2018-01-21 ENCOUNTER — MYC MEDICAL ADVICE (OUTPATIENT)
Dept: FAMILY MEDICINE | Facility: CLINIC | Age: 53
End: 2018-01-21

## 2018-01-31 ENCOUNTER — OFFICE VISIT (OUTPATIENT)
Dept: PALLIATIVE MEDICINE | Facility: CLINIC | Age: 53
End: 2018-01-31
Payer: COMMERCIAL

## 2018-01-31 VITALS
HEART RATE: 97 BPM | BODY MASS INDEX: 31.04 KG/M2 | DIASTOLIC BLOOD PRESSURE: 85 MMHG | WEIGHT: 255 LBS | SYSTOLIC BLOOD PRESSURE: 132 MMHG

## 2018-01-31 DIAGNOSIS — M54.41 CHRONIC BILATERAL LOW BACK PAIN WITH RIGHT-SIDED SCIATICA: Primary | ICD-10-CM

## 2018-01-31 DIAGNOSIS — M25.562 CHRONIC PAIN OF LEFT KNEE: ICD-10-CM

## 2018-01-31 DIAGNOSIS — G89.29 CHRONIC PAIN OF LEFT KNEE: ICD-10-CM

## 2018-01-31 DIAGNOSIS — M79.18 MYOFASCIAL PAIN: ICD-10-CM

## 2018-01-31 DIAGNOSIS — G89.4 CHRONIC PAIN SYNDROME: ICD-10-CM

## 2018-01-31 DIAGNOSIS — M51.369 DDD (DEGENERATIVE DISC DISEASE), LUMBAR: ICD-10-CM

## 2018-01-31 DIAGNOSIS — G89.29 CHRONIC BILATERAL LOW BACK PAIN WITH RIGHT-SIDED SCIATICA: Primary | ICD-10-CM

## 2018-01-31 PROCEDURE — 99214 OFFICE O/P EST MOD 30 MIN: CPT | Performed by: NURSE PRACTITIONER

## 2018-01-31 RX ORDER — OXYCODONE HYDROCHLORIDE 5 MG/1
TABLET ORAL
Qty: 45 TABLET | Refills: 0 | Status: SHIPPED | OUTPATIENT
Start: 2018-01-31 | End: 2018-03-01

## 2018-01-31 ASSESSMENT — PAIN SCALES - GENERAL: PAINLEVEL: EXTREME PAIN (8)

## 2018-01-31 NOTE — NURSING NOTE
"Chief Complaint   Patient presents with     Pain       Initial /85  Pulse 97  Wt 115.7 kg (255 lb)  BMI 31.04 kg/m2 Estimated body mass index is 31.04 kg/(m^2) as calculated from the following:    Height as of 8/22/17: 1.93 m (6' 4\").    Weight as of this encounter: 115.7 kg (255 lb).  Medication Reconciliation: complete     Nicolasa Krueger MA        "

## 2018-01-31 NOTE — MR AVS SNAPSHOT
After Visit Summary   1/31/2018    Jailene Breen    MRN: [id number]           Patient Information     Date Of Birth          1965        Visit Information        Provider Department      1/31/2018 4:00 PM Susana Pike APRN [facility] [place]        Today's Diagnoses     Chronic bilateral low back pain with right-sided sciatica    -  1    DDD (degenerative disc disease), lumbar        Chronic pain of left knee        Chronic bilateral low back pain without sciatica          Care Instructions    PLAN:  1. Increase physical activity, pace yourself   2. Follow up with Dr. Danielson as scheduled.   3. Medications:   1. Continue oxycodone, 1-2 tabs per day, but not 2 tabs every day  2. Stop Topamax due to side effects  4. Procedures: schedule an appointment for lumbar epidural steroid injection, our office will contact you   5. Find a Primary Care Provider at [facility] in [place] including Angeles Prado PA-C  6. Follow-up with me in 10-12 weeks      ----------------------------------------------------------------  Nurse Triage line:  [phone]   Call this number with any questions or concerns. You may leave a detailed message anytime. Calls are typically returned Monday through Friday between 8 AM and 4:30 PM. We usually get back to you within 2 business days depending on the issue/request.       Medication refills:    For non-narcotic medications, call your pharmacy directly to request a refill. The pharmacy will contact the [facility] for authorization. Please allow 3-4 days for these refills to be processed.     For narcotic refills, call the nurse triage line or send a [employer] message. Please contact us 7-10 days before your refill is due. The message MUST include the name of the specific medication(s) requested and how you would like to receive the prescription(s). The options are as follows:    Pain Clinic staff can mail the prescription to your  pharmacy. Please tell us the name of the pharmacy.    You may pick the prescription up at the Pain Clinic (tell us the location) or during a clinic visit with your pain provider    Pain Clinic staff can deliver the prescription to the [facility] pharmacy in the clinic building. Please tell us the location.      Scheduling number: [phone].  Call this number to schedule or change appointments.    We believe regular attendance is key to your success in our program.    Any time you are unable to keep your appointment we ask that you call us at least 24 hours in advance to let us know. This will allow us to offer the appointment time to another patient.               Follow-ups after your visit        Additional Services     PAIN INJECTION EVAL/TREAT/FOLLOW UP                 Your next 10 appointments already scheduled     Feb 09, 2018  3:40 PM CST   Return Visit with Josias Danielson DO   [facility] [place] ([facility] [place])    [address]  [place] [id number] [phone]              Who to contact     If you have questions or need follow up information about today's clinic visit or your schedule please contact [facility] directly at [phone].  Normal or non-critical lab and imaging results will be communicated to you by MyChart, letter or phone within 4 business days after the clinic has received the results. If you do not hear from us within 7 days, please contact the clinic through MyChart or phone. If you have a critical or abnormal lab result, we will notify you by phone as soon as possible.  Submit refill requests through [employer] or call your pharmacy and they will forward the refill request to us. Please allow 3 business days for your refill to be completed.          Additional Information About Your Visit        [employer] Information     [employer] gives you secure access to your electronic health record. If you see a primary  care provider, you can also send messages to your care team and make appointments. If you have questions, please call your primary care clinic.  If you do not have a primary care provider, please call [phone] and they will assist you.        Care EveryWhere ID     This is your Care EveryWhere ID. This could be used by other organizations to access your [facility] medical records  [id number]        Your Vitals Were     Pulse BMI (Body Mass Index)                97 31.04 kg/m2           Blood Pressure from Last 3 Encounters:   01/31/18 132/85   08/22/17 144/82   08/08/17 (!) 146/99    Weight from Last 3 Encounters:   01/31/18 115.7 kg (255 lb)   08/22/17 109.3 kg (241 lb)   07/28/17 109.3 kg (241 lb)              We Performed the Following     PAIN INJECTION EVAL/TREAT/FOLLOW UP          Today's Medication Changes          These changes are accurate as of 1/31/18  4:49 PM.  If you have any questions, ask your nurse or doctor.               These medicines have changed or have updated prescriptions.        Dose/Directions    oxyCODONE IR 5 MG tablet   Commonly known as:  ROXICODONE   This may have changed:  additional instructions   Used for:  Chronic pain of left knee, Chronic bilateral low back pain without sciatica        Take 1 tablet every 6-8 hours as needed for pain, max of 2 tabs per day but cannot take 2 tabs every day.  Okay to fill on 1/31/2018 and start on 2/4/2018.  To last 30 days.   Quantity:  45 tablet   Refills:  0         Stop taking these medicines if you haven't already. Please contact your care team if you have questions.     topiramate 25 MG tablet   Commonly known as:  TOPAMAX                Where to get your medicines      Some of these will need a paper prescription and others can be bought over the counter.  Ask your nurse if you have questions.     Bring a paper prescription for each of these medications     oxyCODONE IR 5 MG tablet                Primary Care Provider Office Phone # Fax  #    Reinaldoer JASON Meyer [phone]       [address] [address] [address]        Equal Access to Services     SARATH ESPINAL : Hadtwyla kiran pearson rao Myrick, wafranda guillaume, qaanayelita kadayda shanta, stephanie murray laDilipclint paredes. So [facility] [phone].    ATENCIÓN: Si habla español, tiene a carrington disposición servicios gratuitos de asistencia lingüística. Llame al [phone].    We comply with applicable federal civil rights laws and [place] laws. We do not discriminate on the basis of race, color, national origin, age, disability, sex, sexual orientation, or gender identity.            Thank you!     Thank you for choosing [facility]  for your care. Our goal is always to provide you with excellent care. Hearing back from our patients is one way we can continue to improve our services. Please take a few minutes to complete the written survey that you may receive in the mail after your visit with us. Thank you!             Your Updated Medication List - Protect others around you: Learn how to safely use, store and throw away your medicines at www.disposemymeds.org.          This list is accurate as of 1/31/18  4:49 PM.  Always use your most recent med list.                   Brand Name Dispense Instructions for use Diagnosis    buPROPion 150 MG 12 hr tablet    WELLBUTRIN SR    180 tablet    Take 1 tablet (150 mg) by mouth 2 times daily    Anxiety, Tobacco abuse       folic acid 1 MG tablet    FOLVITE     Reported on 5/19/2017        HUMIRA PEN 40 MG/0.8ML pen kit   Generic drug:  adalimumab           IBUPROFEN PO      Take 800 mg by mouth every 6 hours as needed for moderate pain Reported on 5/19/2017        ketoconazole 2 % shampoo    NIZORAL    120 mL    Apply to the affected area and wash off after 5 minutes.use until skin condition is better    Seborrheic dermatitis       methotrexate 2.5 MG tablet CHEMO      6 tablets Reported on 4/3/2017         nortriptyline 50 MG capsule    PAMELOR    90 capsule    Take 1 capsule (50 mg) by mouth At Bedtime    Anxiety, Other chronic pain       oxyCODONE IR 5 MG tablet    ROXICODONE    45 tablet    Take 1 tablet every 6-8 hours as needed for pain, max of 2 tabs per day but cannot take 2 tabs every day.  Okay to fill on 1/31/2018 and start on 2/4/2018.  To last 30 days.    Chronic pain of left knee, Chronic bilateral low back pain without sciatica

## 2018-01-31 NOTE — PATIENT INSTRUCTIONS
PLAN:  1. Increase physical activity, pace yourself   2. Follow up with Dr. Danielson as scheduled.   3. Medications:   1. Continue oxycodone, 1-2 tabs per day, but not 2 tabs every day  2. Stop Topamax due to side effects  4. Procedures: schedule an appointment for lumbar epidural steroid injection, our office will contact you   5. Find a Primary Care Provider at [facility] in [place] including Mr. Johnie Prado PA-C  6. Follow-up with me in 10-12 weeks      ----------------------------------------------------------------  Nurse Triage line:  [phone]   Call this number with any questions or concerns. You may leave a detailed message anytime. Calls are typically returned Monday through Friday between 8 AM and 4:30 PM. We usually get back to you within 2 business days depending on the issue/request.       Medication refills:    For non-narcotic medications, call your pharmacy directly to request a refill. The pharmacy will contact the [facility] Center for authorization. Please allow 3-4 days for these refills to be processed.     For narcotic refills, call the nurse triage line or send a [employer] message. Please contact us 7-10 days before your refill is due. The message MUST include the name of the specific medication(s) requested and how you would like to receive the prescription(s). The options are as follows:    Pain Clinic staff can mail the prescription to your pharmacy. Please tell us the name of the pharmacy.    You may pick the prescription up at the Pain Clinic (tell us the location) or during a clinic visit with your pain provider    Pain Clinic staff can deliver the prescription to the [facility] pharmacy in the clinic building. Please tell us the location.      Scheduling number: [phone].  Call this number to schedule or change appointments.    We believe regular attendance is key to your success in our program.    Any time you are unable to keep your appointment we ask that you call us  at least 24 hours in advance to let us know. This will allow us to offer the appointment time to another patient.

## 2018-01-31 NOTE — PROGRESS NOTES
"[facility]    1/31/2018     Chief complaint: left knee pain, mid and low back pain    Interval history:  Jailene Breen is a 52 year old male is known to me for   Lumbar spondylosis, pain is worse with extension and rotation indicating a facetogenic component to pain  Lumbar degenerative disc disease  SI joint pain  Ankylosing spondylitis  Myofascial pain  Chronic pain syndrome  PMHx includes: Panic attacks, back pain, arthritis, anxiety, ankylosing spondylitis  PSHx includes: Right knee surgery ×2, left foot surgery right knee arthroscopy        Recommendations/plan at the last visit on 8/8/2017 included:  1. Patient is to schedule a follow up appt to see Dr. Josias Levine of non-surgical ortho next week in follow up re: his ongoing left knee joint pain  2. Physical Therapy:  I agree with left knee physical therapy  3. Clinical Health Psychologist: The patient will schedule a follow up with Padilla Keene  to address issues of relaxation, behavioral change, coping style, and other factors important to improvement.    4. Diagnostic Studies:  none  5. Medication Management:    1. Start Topamax as directed. Stay well hydrated and no alcohol with this medication  2. May us max of 2 tabs per day of the oxycodone but he cannot use 2 tablets every single day  6. Further procedures recommended: none  7. Obtained left knee x-rays today  8. Recommendations to PCP: see above  9. Follow up: 8-12 weeks    Since his last visit, Jailene Breen reports:  -he continues to experience low and mid back pain with radiating pain to right leg and down to foot. He relates he can never get comfortable   -ongoing left knee pain. Follow up with Dr Danielson on 2/9/18  -Topamax made him feel \"weird\" the next day  -he has gained approximately 14 pounds since last visit   -difficulty breathing lately; potential side effect of Humira per patient; no ankle swelling. He has consulted with rheumatology regarding this as " "well.  -right great toe feels numb  -Anxiety has improved   -He made the amari's list last semester in school   -working on trying to quit chewing tobacco    At this point, the patient's participation with our multidisciplinary team includes:  The patient has been compliant with the program.  Pain Group - not ordered  PT - has seen [facility] - seeing Padilla Keene       Pain scores:  Pain intensity on average is 7 on a scale of 0-10.    Range is 7-9/10.   Pain right now is 8/10.  Pain is described as \"penetrating, sharp, gnawing, miserable.\"    Pain is constant in nature    Current pain relevant medications:   -nortriptyline 10-30mg at bedtime (30mg at bedtime, helpful)  -humira 40mg twice monthly(helpful)  -ibuprofen 800mg TID PRN (using 3 times daily-helpful)  -Tylenol 500mg using 1000mg TID (unsure if helpful)  -Maxalt 10mg PRN migraine (helpful for migraine--he does have visual aura)  -Oxycodone PRN (taking 1-2 tabs per day)   -Topamax 25 mg HS (not helpful, felt weird on it, stopped taking it)    Other pertinent medications:  -bupropion for tobacco cessation     Previous Medications:  OPIATES: Oxycodone (helpful), Fentanyl (100mcg/hr patch helpful), hydrocodone (itching), Tramadol (not helpful)   NSAIDS: ibuprofen (not helpful), Aleve (not helpful)  MUSCLE RELAXANTS: methocarbamol (somewhat helpful), Flexeril (somewhat helpful), tizanidine (unsure if helpful), metaxalone (unsure), SOMA (helpful)  ANTI-MIGRAINE MEDS: Maxalt (helpful for migraine), Imitrex injection (somewhat helpful)  ANTI-DEPRESSANTS: Prozac (helpful), Cymbalta (felt weird on it), nortriptyline (unsure if helpful), Buspar (unsure), Remeron (blacked out)  SLEEP AIDS: Ambien (helpful)  ANTI-CONVULSANTS: gabapentin (felt odd on this medication, unsure of dose), Lyrica (not helpful for 4 months, unsure of dose), Topamax (not helpful, he felt weird on it)   TOPICALS: Lidocaine patches (not helpful), Voltaren gel (not " helpful)  Other meds: Tylenol (unsure if helpful)        Other treatments have included:  Jailene Breen has been seen at a pain clinic in the past.  [facility]  PT: tried, not helpful  Chiropractic: tried, not helpful  Acupuncture: none  TENs Unit: tried, helpful     Injections:   -has had injections at outside clinics  -has had epidural injections for low back pain (one was helpful)  -he has had lumbar medial branch blocks at [facility] and he was going to have lumbar radiofrequency ablation (did not do this due to cost)          Side Effects: no side effect  Patient is using the medication as prescribed: YES    Medications:  Current Outpatient Prescriptions   Medication Sig Dispense Refill     topiramate (TOPAMAX) 25 MG tablet Take 1 tablet (25 mg) by mouth At Bedtime 30 tablet 0     oxyCODONE IR (ROXICODONE) 5 MG tablet Take 1 tablet every 6-8 hours as needed for pain, max of 2 tabs per day but cannot take 2 tabs every day.  Okay to fill now.  To start on 1/5/18.  To last 30 days. If miss upcoming appt, this will be the last script. 45 tablet 0     buPROPion (WELLBUTRIN SR) 150 MG 12 hr tablet Take 1 tablet (150 mg) by mouth 2 times daily 180 tablet 1     nortriptyline (PAMELOR) 50 MG capsule Take 1 capsule (50 mg) by mouth At Bedtime 90 capsule 1     HUMIRA PEN 40 MG/0.8ML pen kit        methotrexate 2.5 MG tablet 6 tablets Reported on 4/3/2017       folic acid (FOLVITE) 1 MG tablet Reported on 5/19/2017       IBUPROFEN PO Take 800 mg by mouth every 6 hours as needed for moderate pain Reported on 5/19/2017       clonazePAM (KLONOPIN) 1 MG tablet Take 0.5-1 tablets (0.5-1 mg) by mouth 2 times daily as needed for anxiety (Patient not taking: Reported on 1/31/2018) 15 tablet 0     ketoconazole (NIZORAL) 2 % shampoo Apply to the affected area and wash off after 5 minutes.use until skin condition is better (Patient not taking: Reported on 1/31/2018) 120 mL 1       Medical History: any changes in  medical history since they were last seen? No    Social History:   Home situation: , has 2 children in college  Occupation/Schooling: currently unemployed, worked as a  (unemployed since 3/1/2017)  Tobacco use: none  Alcohol use: none  Drug use: none  History of chemical dependency treatment: none    Review of Systems:  ROS is positive as per HPI as well as for weight gain, tinnitus, arthritis, back pain, joint pain, numbness/tingling, depression, anxiety, stress and is negative for fevers, chills, sweats, or constipation, diarrhea.    This document serves as a record of the services and decisions personally performed and made by Susana GRANT. It was created on her behalf by Josie Crystal, a trained medical scribe. The creation of this document is based on the provider's statements to the medical scribe.  Josie Crystal 4:18 PM January 31, 2018    Physical Exam:  Vital signs: /85  Pulse 97  Wt 115.7 kg (255 lb)  BMI 31.04 kg/m2     Behavioral observations:  Awake, alert, cooperative.     Gait:  normal    Musculoskeletal exam:    Moves well in the exam room  Strength grossly equal throughout  Positive straight leg raise on the right     Neuro exam:  SILT in all extremities     Skin/vascular/autonomic:  No suspicious lesions on exposed skin.      Other:  NA      [place] Prescription Monitoring Program:  Reveed    DIRE Score for selecting candidates for long term opioid analgesia for chronic pain:  Diagnosis  2  Intractablility  2  Risk    Psychological health  2    Chemical health  2    Reliability  2    Social support  2  Efficacy  2    Total DIRE Score = 14. Note that  7-13 predicts poor outcome (compliance and efficacy) from opioid prescribing; 14-21 predicts good outcome (compliance and efficacy)  from opioid prescribing.      Assessment:   1. Chronic low back pain with right sided radicular symptoms  2. Lumbar DDD  3. Chronic pain of the left knee.  4. Lumbar spondylosis,  pain is worse with extension and rotation indicating a facetogenic component to pain  5. Myofascial pain   6. Chronic pain syndrome  7. PMHx includes: Panic attacks, back pain, arthritis, anxiety, ankylosing spondylitis  8. PSHx includes: Right knee surgery ×2, left foot surgery right knee arthroscopy      Plan:   1. Recommended to increase activity while pacing himself  2. Patient is to schedule a follow up appt to see Dr. Josias Levine of non-surgical ortho on 2/9/18 in follow up re: his ongoing left knee joint pain  3. Physical Therapy:  I agree with left knee physical therapy  4. Clinical Health Psychologist: The patient will schedule a follow up with Padilla Keene  to address issues of relaxation, behavioral change, coping style, and other factors important to improvement.    5. Diagnostic Studies:  none  6. Medication Management:    1. Stop topamax due to side effects   2. May us max of 2 tabs per day of the oxycodone but he cannot use 2 tablets every single day  7. Further procedures recommended: LESI, our office will contact patient to schedule   8. Advised patient to find a new PCP   9. Recommendations to PCP: see above  10. Follow up: 10-12 weeks    Total time spent face to face was 35 minutes and more than 50% of face to face time was spent in counseling and/or coordination of care regarding the diagnosis and recommendations above.    The information in this document, created by the medical scribe for me, accurately reflects the services I personally performed and the decisions made by me. I have reviewed and approved this document for accuracy prior to leaving the patient care area.  January 31, 2018 5:31 PM    Susana GRANT RN CNP, FNP  [facility] [place]

## 2018-02-07 ENCOUNTER — TELEPHONE (OUTPATIENT)
Dept: FAMILY MEDICINE | Facility: CLINIC | Age: 53
End: 2018-02-07

## 2018-02-07 ENCOUNTER — MYC MEDICAL ADVICE (OUTPATIENT)
Dept: FAMILY MEDICINE | Facility: CLINIC | Age: 53
End: 2018-02-07

## 2018-02-07 DIAGNOSIS — F41.9 ANXIETY: Primary | ICD-10-CM

## 2018-02-07 DIAGNOSIS — G89.4 CHRONIC PAIN SYNDROME: Primary | ICD-10-CM

## 2018-02-07 RX ORDER — CLONAZEPAM 1 MG/1
0.5-1 TABLET ORAL 2 TIMES DAILY PRN
Qty: 12 TABLET | Refills: 0 | Status: SHIPPED | OUTPATIENT
Start: 2018-02-07 | End: 2018-04-10

## 2018-02-07 NOTE — TELEPHONE ENCOUNTER
Patient was notified regarding MSG below.  Patient denied having any questions and stated clarification at the end of the conversation. He requested that his rx is brought to our pharmacy.   Randa Galvez

## 2018-02-07 NOTE — TELEPHONE ENCOUNTER
Unsure which medication the patient is hoping to fill.  However, it appears that the patient was prescribed Klonopin in the past.    clonazePAM (KLONOPIN) 1 MG tablet (Discontinued) 15 tablet 0 12/28/2017 1/31/2018 No   Sig: Take 0.5-1 tablets (0.5-1 mg) by mouth 2 times daily as needed for anxiety   Patient not taking: Reported on 1/31/2018        Class: Local Print     Routing refill request to provider for review/approval because:  Drug not active on patient's medication list  Samantha Franklin RN

## 2018-02-07 NOTE — TELEPHONE ENCOUNTER
Hello. I received a notification that patient is due for his controlled substance problem list review and I thought this would be more appropriate coming from your service.    Sincerely,  Ruperto Resendez PA-C

## 2018-02-13 DIAGNOSIS — G89.29 OTHER CHRONIC PAIN: ICD-10-CM

## 2018-02-13 DIAGNOSIS — F41.9 ANXIETY: ICD-10-CM

## 2018-02-13 NOTE — TELEPHONE ENCOUNTER
Requested Prescriptions   Pending Prescriptions Disp Refills     nortriptyline (PAMELOR) 50 MG capsule    Last Written Prescription Date:  7/28/17  Last Fill Quantity: 90,  # refills: 1   Last Office Visit with [facility], [facility] or [facility] prescribing provider:  7/28/17   Future Office Visit:    Next 5 appointments (look out 90 days)     Apr 11, 2018  9:00 AM CDT   Return Visit with JUANITA Ga CNP   [facility] [place] ([facility] [place])    [address]  [place] [address]   [phone]                  90 capsule 1     Sig: Take 1 capsule (50 mg) by mouth At Bedtime    There is no refill protocol information for this order              Augustin Faarax  [facility]

## 2018-02-15 NOTE — TELEPHONE ENCOUNTER
Reason for Call:  Other prescription    Detailed comments: patient is out of this medication    Phone Number Patient can be reached at: Other phone number:    Jailene Breen (Self) [phone] (H)         Best Time: any    Can we leave a detailed message on this number? YES    Call taken on 2/15/2018 at 11:05 AM by Danielle Ureña

## 2018-02-16 ENCOUNTER — MYC REFILL (OUTPATIENT)
Dept: FAMILY MEDICINE | Facility: CLINIC | Age: 53
End: 2018-02-16

## 2018-02-16 DIAGNOSIS — G89.29 OTHER CHRONIC PAIN: ICD-10-CM

## 2018-02-16 DIAGNOSIS — F41.9 ANXIETY: ICD-10-CM

## 2018-02-16 RX ORDER — NORTRIPTYLINE HYDROCHLORIDE 50 MG/1
50 CAPSULE ORAL AT BEDTIME
Qty: 90 CAPSULE | Refills: 1 | Status: SHIPPED | OUTPATIENT
Start: 2018-02-16 | End: 2019-03-27

## 2018-02-16 NOTE — TELEPHONE ENCOUNTER
Message from [email or web address]:  Original authorizing provider: HERIBERTO Lovell would like a refill of the following medications:  nortriptyline (PAMELOR) 50 MG capsule [Nanette Haywood PA-C]    Preferred pharmacy: [id number] - [facility] [address]    Comment:  I had the pharmacy send a request in the beginning of the week and I haven't had any for a couple of days. Please refill.

## 2018-02-17 ENCOUNTER — MYC MEDICAL ADVICE (OUTPATIENT)
Dept: FAMILY MEDICINE | Facility: CLINIC | Age: 53
End: 2018-02-17

## 2018-02-19 ENCOUNTER — TELEPHONE (OUTPATIENT)
Dept: PALLIATIVE MEDICINE | Facility: CLINIC | Age: 53
End: 2018-02-19

## 2018-02-19 RX ORDER — NORTRIPTYLINE HYDROCHLORIDE 50 MG/1
50 CAPSULE ORAL AT BEDTIME
Qty: 90 CAPSULE | Refills: 1 | OUTPATIENT
Start: 2018-02-19

## 2018-02-19 NOTE — TELEPHONE ENCOUNTER
Called patient to call Lumbar MAKAYLA. Patient will call us back to schedule. Advised phone number.      Elana Maldonado    [facility]

## 2018-02-19 NOTE — TELEPHONE ENCOUNTER
Per chart review, medication appears to have been refilled and confirmed by pharmacy as posted above.     Amanda Van RN

## 2018-02-20 ENCOUNTER — OFFICE VISIT (OUTPATIENT)
Dept: ORTHOPEDICS | Facility: CLINIC | Age: 53
End: 2018-02-20
Payer: COMMERCIAL

## 2018-02-20 ENCOUNTER — OFFICE VISIT (OUTPATIENT)
Dept: FAMILY MEDICINE | Facility: CLINIC | Age: 53
End: 2018-02-20
Payer: COMMERCIAL

## 2018-02-20 VITALS
DIASTOLIC BLOOD PRESSURE: 86 MMHG | BODY MASS INDEX: 31.04 KG/M2 | OXYGEN SATURATION: 97 % | RESPIRATION RATE: 18 BRPM | SYSTOLIC BLOOD PRESSURE: 136 MMHG | TEMPERATURE: 98.6 F | WEIGHT: 255 LBS | HEART RATE: 116 BPM

## 2018-02-20 VITALS
WEIGHT: 255 LBS | HEIGHT: 76 IN | DIASTOLIC BLOOD PRESSURE: 84 MMHG | SYSTOLIC BLOOD PRESSURE: 122 MMHG | BODY MASS INDEX: 31.05 KG/M2

## 2018-02-20 DIAGNOSIS — J01.01 ACUTE RECURRENT MAXILLARY SINUSITIS: Primary | ICD-10-CM

## 2018-02-20 DIAGNOSIS — M25.562 LEFT KNEE PAIN, UNSPECIFIED CHRONICITY: Primary | ICD-10-CM

## 2018-02-20 DIAGNOSIS — M94.262 CHONDROMALACIA, KNEE, LEFT: ICD-10-CM

## 2018-02-20 DIAGNOSIS — J20.9 ACUTE BRONCHITIS, UNSPECIFIED ORGANISM: ICD-10-CM

## 2018-02-20 DIAGNOSIS — J06.9 VIRAL UPPER RESPIRATORY TRACT INFECTION: ICD-10-CM

## 2018-02-20 PROCEDURE — 99213 OFFICE O/P EST LOW 20 MIN: CPT | Performed by: PEDIATRICS

## 2018-02-20 PROCEDURE — 99214 OFFICE O/P EST MOD 30 MIN: CPT | Performed by: PHYSICIAN ASSISTANT

## 2018-02-20 RX ORDER — AZITHROMYCIN 250 MG/1
TABLET, FILM COATED ORAL
Qty: 6 TABLET | Refills: 0 | Status: SHIPPED | OUTPATIENT
Start: 2018-02-20 | End: 2018-04-10

## 2018-02-20 RX ORDER — PREDNISONE 20 MG/1
20 TABLET ORAL DAILY
Qty: 5 TABLET | Refills: 0 | Status: SHIPPED | OUTPATIENT
Start: 2018-02-20 | End: 2021-02-02

## 2018-02-20 NOTE — PROGRESS NOTES
Sports Medicine Clinic Visit    PCP: Aiden Resendez Jamshid is a 52 year old male who is seen in f/u up for    Left knee pain, unspecified chronicity  Chondromalacia, knee, left. Since last visit on 8/22/17 patient has continued to have knee pain.  He has pain with stairs.  He states he has not had much relief from the injection.  He states he is confused as to why his knee is still painful as injuries/bruises are only supposed to last 12 weeks.  He is frusterated not being able to run.      Has not taken this dose of Humira due to having the flu and a cold.  He was supposed to have a dose this past Monday, but unable to due to his illness.  He states he had the flu last week and now has a chest cold and sore throat.   He will be seeing primary care later today.     **  Continues to have pain going up stairs, anterior knee.    Has not had any swelling in the left knee. No clicking, popping and snapping.    Had one visit to Physical Therapy, and did to home exercises, but felt they weren't beneficial.   Did have Viscosupplementation in the right knee 10+ years ago.   **  Has had some numbness in the right great toe. Has tingling with tapping the dorsal midfoot. Discussed this with his rheumatologist, had an x-ray (not available today).     Review of Systems  All other systems reviewed and are negative unless noted above.    This document serves as a record of the services and decisions personally performed and made by Josias Danielson DO, CAQ. It was created on his behalf by Aguilar Gallego, a trained medical scribe. The creation of this document is based the provider's statements to the medical scribe.  Aguilar Gallego February 20, 2018 9:15 AM       Past Medical History:   Diagnosis Date     Ankylosing spondylitis (H) 3/1/2017     Anxiety      Arthritis     back, knees     Back pain     possible drug seeking behavior in the past.      Panic attacks      Past Surgical History:   Procedure  "Laterality Date     ARTHROSCOPY KNEE Right 9/22/2016    Procedure: ARTHROSCOPY KNEE;  Surgeon: Fredo Hughes MD;  Location: [facility] OR     INJECT PARAVERTEBRAL FACET JOINT LUMBAR / SACRAL FIRST Right 11/25/2016    Procedure: INJECT PARAVERTEBRAL FACET JOINT LUMBAR / SACRAL FIRST;  Surgeon: Doretha Magallanes MD;  Location: [facility] OR     ORTHOPEDIC SURGERY      Rt knee X 2     ORTHOPEDIC SURGERY      Lt foot       Objective  /84  Ht 6' 4\" (1.93 m)  Wt 255 lb (115.7 kg)  BMI 31.04 kg/m2    GENERAL APPEARANCE: alert and no acute distress   GAIT: NORMAL  SKIN: no suspicious lesions or rashes  NEURO: Normal strength and tone, mentation intact and speech normal  PSYCH:  mentation appears normal and affect normal/bright  HEENT: no scleral icterus  CV: no extremity edema   RESP: nonlabored breathing    Exam  Left Knee exam    ROM: full        Flexion full, symmetric, no change in pain        Extension full, symmetric, no change in pain      Inspection:       no visible ecchymosis       no visible edema or effusion    Skin:       no visible deformities       well perfused       capillary refill brisk    Patellar Motion:        Crepitus noted in the patellofemoral joint       No pain with patellar translation     Special Tests:        neg (-) Navdeep       neg (-) Lachman       neg (-) varus, no pain        neg (-) valgus, no pain        No pain with active leg raise     Evaluation of ipsilateral kinetic chain  Mini squat - increased pain around the patella with motion, unable to complete the motion.   Single leg squat deferred      Radiology  Reviewed prior pertinent imaging:     KNEE STANDING AP, BILATERAL SUNRISE, BILATERAL LATERAL LEFT   8/8/2017  4:07 PM      HISTORY: Chronic left knee pain.     COMPARISON: None.         IMPRESSION:   1. Right knee two views: Mild osteoarthritis in the medial knee joint  compartment and patellofemoral joint.  2. Left knee three views: Mild osteoarthritis medial knee " joint  compartment and patellofemoral joint. No effusion.     FIDE PATIÑO MD      MR KNEE LEFT WITHOUT CONTRAST   5/25/2017 10:07 AM     HISTORY: Left knee pain.     COMPARISON: Radiographs on 5/17/2017.     TECHNIQUE: Transverse and coronal T2 with fat suppression. Coronal T1.  Sagittal proton density and T2.     FINDINGS:      Medial Meniscus: There is mild myxoid degeneration in the posterior  horn with no tear demonstrated.      Lateral Meniscus: No tear, displaced fragment, or extrusion.       Anterior Cruciate Ligament: Unremarkable.      Posterior Cruciate Ligament: Unremarkable.      Medial Collateral Ligament: Unremarkable.     Lateral Collateral Ligament Complex, Popliteus Tendon: The iliotibial  band, fibular collateral ligament, biceps femoris tendon, and  popliteus tendon are unremarkable.     Osseous and Cartilaginous Structures: No fracture or osseous lesion is  demonstrated. There is moderate marrow edema seen within the anterior  aspect of the lateral femoral condyle, predominantly in the epiphysis  but also extending into the metaphysis. No other abnormal marrow  signal intensity is seen. There is grade 2 chondromalacia throughout  the weightbearing portion of the medial femoral condyle and also in  the medial patellar facet. The remaining cartilage surfaces are well  preserved.      Extensor Mechanism: The quadriceps and patellar tendons are  unremarkable. The medial and lateral patellar retinacula appear  unremarkable.     Joint Space: No joint effusion. No definite loose bodies appreciated.     Additional Findings: There is a just over 1 cm diameter Baker's cyst.  No semimembranosus-tibial collateral ligament or pes anserine  bursitis. No adjacent soft tissue pathology is seen.         IMPRESSION:  1. Bone contusion of the anterior aspect of the lateral femoral  condyle. No fracture is demonstrated. Although this can be seen  following lateral patellar dislocations, there is no  additional  evidence of a recent lateral patellar dislocation. Clinical  correlation is recommended.  2. 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     DAVIS CASTORENA MD    Assessment:  1. Left knee pain, unspecified chronicity    2. Chondromalacia, knee, left        Plan:  Discussed the assessment with the patient.    Regarding right great toe tingling:  Sees pain mgmt for other issues. I deferred to pain mgmt for this for now; there is a recent phone call about possible lumbar injection. Otherwise, Edx testing is consideration. MRI lumbar spine from 2016 demonstrated L5-S1 disc protrusion abutting right S1 nerve root.      Pertinent imaging of the area reviewed with the patient--KNEE.    Discussed:  *Symptom Treatment - Ice, OTCs   *Activity Modification   *Rehab - formal Physical Therapy   *Injection - Corticosteroid injection vs Viscosupplementation (ultrasound)  *Imaging - MRI reviewed   *Support - knee brace, patella support (best after or in conjunction with Physical Therapy)     Topical Treatments: Ice prn   Over the counter medication: Patient's preferred OTC medication as directed on packaging.  Activity Modification: as discussed   Rehab: Physical Therapy: [facility] - [phone]; next step . Recommend maximize PT next. May consider some patellar support taping as well, as we briefly discussed bracing possible.  He wants to try visco. *Referral to Dr. Valladares for ultrasound guided Viscosupplementation .   Follow up: in 4-6 weeks if not improving with Physical Therapy and visco; otherwise, as needed    Questions answered. The patient indicates understanding of these issues and agrees with the plan.     Josias Danielson, DO, CAQ       Disclaimer: This note consists of symbols derived from keyboarding, dictation and/or voice recognition software. As a result, there may be errors in the script that have gone undetected. Please consider this when  interpreting information found in this chart.    The information in this document, created by the medical scribe for me, accurately reflects the services I personally performed and the decisions made by me. I have reviewed and approved this document for accuracy.   Josias Danielson DO, CAQ

## 2018-02-20 NOTE — PROGRESS NOTES
SUBJECTIVE:   Jailene Breen is a 52 year old male who presents to clinic today for the following health issues:      Acute Illness   Acute illness concerns: non-productive cough  Onset: 1 week    Fever: no    Chills/Sweats: no    Headache (location?): no    Sinus Pressure:no    Conjunctivitis:  no    Ear Pain: bilateral ear pain    Rhinorrhea: YES    Congestion: YES    Sore Throat: no     Cough: YES-non-productive    Wheeze: no    Decreased Appetite: no    Nausea: no    Vomiting: no    Diarrhea:  no    Dysuria/Freq.: no    Fatigue/Achiness: YES    Sick/Strep Exposure: no     Therapies Tried and outcome: none      Noting sinus pain and pressure.   Some rattling in his chest.   Problem list and histories reviewed & adjusted, as indicated.  Additional history: as documented    BP Readings from Last 3 Encounters:   02/20/18 136/86   02/20/18 122/84   01/31/18 132/85    Wt Readings from Last 3 Encounters:   02/20/18 255 lb (115.7 kg)   02/20/18 255 lb (115.7 kg)   01/31/18 255 lb (115.7 kg)                    Reviewed and updated as needed this visit by clinical staff  Tobacco  Allergies  Meds       Reviewed and updated as needed this visit by Provider         All other systems negative except as outline above  OBJECTIVE:  ENT exam reveals - bilateral TM fluid noted, neck without nodes, throat normal without erythema or exudate, maxillary sinus tender, post nasal drip noted, nasal mucosa congested and nasal mucosa pale and congested.  CHEST:no tachypnea, retractions or cyanosis, abnormal lung sounds clear with coughing and S1, S2 normal, no murmur, no gallop, rate regular.    Jailene was seen today for cough.    Diagnoses and all orders for this visit:    Acute recurrent maxillary sinusitis  -     azithromycin (ZITHROMAX) 250 MG tablet; Two tablets first day, then one tablet daily for four days.  -     predniSONE (DELTASONE) 20 MG tablet; Take 1 tablet (20 mg) by mouth daily    Viral upper respiratory tract  infection    Acute bronchitis, unspecified organism  -     predniSONE (DELTASONE) 20 MG tablet; Take 1 tablet (20 mg) by mouth daily          Advised supportive and symptomatic treatment.  Follow up with Provider - if condition persists or worsens.

## 2018-02-20 NOTE — MR AVS SNAPSHOT
After Visit Summary   2/20/2018    Jailene Breen    MRN: [id number]           Patient Information     Date Of Birth          1965        Visit Information        Provider Department      2/20/2018 9:00 AM Josias Danielson,  [facility]        Today's Diagnoses     Left knee pain, unspecified chronicity    -  1    Chondromalacia, knee, left           Follow-ups after your visit        Additional Services     [facility] PT, HAND, AND CHIROPRACTIC REFERRAL       **This order will print in the [facility] Scheduling Office**    Physical Therapy, Hand Therapy and Chiropractic Care are available through:    *[facility]  *[facility]  *[facility]    Call one number to schedule at any of the above locations: [phone].    Your provider has referred you to: Physical Therapy at [facility] or [facility] [place] [place]    Indication/Reason for Referral: Knee Pain  Onset of Illness:   Therapy Orders: Evaluate and Treat  Special Programs: None  Special Request: None    Nayely Adler      Additional Comments for the Therapist or Chiropractor:       Please be aware that coverage of these services is subject to the terms and limitations of your health insurance plan.  Call member services at your health plan with any benefit or coverage questions.      Please bring the following to your appointment:    *Your personal calendar for scheduling future appointments  *Comfortable clothing            ORTHO  REFERRAL       [facility] is referring you to the Orthopedic  Services at [facility] [place].       The  Representative will assist you in the coordination of your Orthopedic and Musculoskeletal Care as prescribed by your physician.    The  Representative will call you within 1 business day to help schedule your appointment, or you may contact the  Representative at:    All areas ~  [phone]     Type of Referral : Left knee Synvsic injection with US guidance, Dr. Valladares        Timeframe requested: Routine    Coverage of these services is subject to the terms and limitations of your health insurance plan.  Please call member services at your health plan with any benefit or coverage questions.      If X-rays, CT or MRI's have been performed, please contact the facility where they were done to arrange for , prior to your scheduled appointment.  Please bring this referral request to your appointment and present it to your specialist.                  Your next 10 appointments already scheduled     Feb 20, 2018 10:20 AM CST   SHORT with Anthony Ball PA-C   [facility]    [address] [id number] [phone]            Apr 11, 2018  9:00 AM CDT   Return Visit with JUANITA Ga CNP   [facility] ([facility])    [address] [id number] [phone]              Who to contact     If you have questions or need follow up information about today's clinic visit or your schedule please contact [facility] directly at [phone].  Normal or non-critical lab and imaging results will be communicated to you by MyChart, letter or phone within 4 business days after the clinic has received the results. If you do not hear from us within 7 days, please contact the clinic through MyChart or phone. If you have a critical or abnormal lab result, we will notify you by phone as soon as possible.  Submit refill requests through [employer] or call your pharmacy and they will forward the refill request to us. Please allow 3 business days for your refill to be completed.          Additional Information About Your Visit        [employer] Information     [employer] gives you secure access to your electronic health record. If you see a primary care  "provider, you can also send messages to your care team and make appointments. If you have questions, please call your primary care clinic.  If you do not have a primary care provider, please call [phone] and they will assist you.        Care EveryWhere ID     This is your Care EveryWhere ID. This could be used by other organizations to access your [facility] medical records  [id number]        Your Vitals Were     Height BMI (Body Mass Index)                6' 4\" (1.93 m) 31.04 kg/m2           Blood Pressure from Last 3 Encounters:   02/20/18 122/84   01/31/18 132/85   08/22/17 144/82    Weight from Last 3 Encounters:   02/20/18 255 lb (115.7 kg)   01/31/18 255 lb (115.7 kg)   08/22/17 241 lb (109.3 kg)              We Performed the Following     [facility] PT, HAND, AND CHIROPRACTIC REFERRAL     ORTHO  REFERRAL        Primary Care Provider Office Phone # Fax #    [place] JASON Meyer [phone] [address] [address] [address]        Equal Access to Services     [facility] : Hadii aad ku hadasho Sosheilaali, waaxda luqadaha, qaybta kaalmada adelazyada, stephanie davis . So [facility] [phone].    ATENCIÓN: Si habla español, tiene a carrington disposición servicios gratuitos de asistencia lingüística. [facility] [phone].    We comply with applicable federal civil rights laws and [place] laws. We do not discriminate on the basis of race, color, national origin, age, disability, sex, sexual orientation, or gender identity.            Thank you!     Thank you for choosing [facility]  for your care. Our goal is always to provide you with excellent care. Hearing back from our patients is one way we can continue to improve our services. Please take a few minutes to complete the written survey that you may receive in the mail after your visit with us. Thank you!             Your Updated Medication List - Protect others around you: " Learn how to safely use, store and throw away your medicines at www.disposemymeds.org.          This list is accurate as of 2/20/18  9:35 AM.  Always use your most recent med list.                   Brand Name Dispense Instructions for use Diagnosis    buPROPion 150 MG 12 hr tablet    WELLBUTRIN SR    180 tablet    Take 1 tablet (150 mg) by mouth 2 times daily    Anxiety, Tobacco abuse       CELEBREX PO           clonazePAM 1 MG tablet    klonoPIN    12 tablet    Take 0.5-1 tablets (0.5-1 mg) by mouth 2 times daily as needed for anxiety    Anxiety       folic acid 1 MG tablet    FOLVITE     Reported on 5/19/2017        HUMIRA PEN 40 MG/0.8ML pen kit   Generic drug:  adalimumab           IBUPROFEN PO      Take 800 mg by mouth every 6 hours as needed for moderate pain Reported on 5/19/2017        ketoconazole 2 % shampoo    NIZORAL    120 mL    Apply to the affected area and wash off after 5 minutes.use until skin condition is better    Seborrheic dermatitis       methotrexate 2.5 MG tablet CHEMO      6 tablets Reported on 4/3/2017        nortriptyline 50 MG capsule    PAMELOR    90 capsule    Take 1 capsule (50 mg) by mouth At Bedtime    Anxiety, Other chronic pain       oxyCODONE IR 5 MG tablet    ROXICODONE    45 tablet    Take 1 tablet every 6-8 hours as needed for pain, max of 2 tabs per day but cannot take 2 tabs every day.  Okay to fill on 1/31/2018 and start on 2/4/2018.  To last 30 days.    Chronic pain of left knee, Chronic bilateral low back pain with right-sided sciatica

## 2018-02-20 NOTE — LETTER
2/20/2018         RE: Jailene Breen  [address]  [place] [address]        Dear Colleague,    Thank you for referring your patient, Jailene Breen, to the [facility] [place]. Please see a copy of my visit note below.    Sports Medicine Clinic Visit    PCP: Aiden Resendez    Jailene Breen is a 52 year old male who is seen in f/u up for    Left knee pain, unspecified chronicity  Chondromalacia, knee, left. Since last visit on 8/22/17 patient has continued to have knee pain.  He has pain with stairs.  He states he has not had much relief from the injection.  He states he is confused as to why his knee is still painful as injuries/bruises are only supposed to last 12 weeks.  He is frusterated not being able to run.      Has not taken this dose of Humira due to having the flu and a cold.  He was supposed to have a dose this past Monday, but unable to due to his illness.  He states he had the flu last week and now has a chest cold and sore throat.   He will be seeing primary care later today.     **  Continues to have pain going up stairs, anterior knee.    Has not had any swelling in the left knee. No clicking, popping and snapping.    Had one visit to Physical Therapy, and did to home exercises, but felt they weren't beneficial.   Did have Viscosupplementation in the right knee 10+ years ago.   **  Has had some numbness in the right great toe. Has tingling with tapping the dorsal midfoot. Discussed this with his rheumatologist, had an x-ray (not available today).     Review of Systems  All other systems reviewed and are negative unless noted above.    This document serves as a record of the services and decisions personally performed and made by Josias Danielson DO, CAQ. It was created on his behalf by Aguilar Gallego, a trained medical scribe. The creation of this document is based the provider's statements to the medical scribe.  Aguilar Gallego February 20,  "2018 9:15 AM       Past Medical History:   Diagnosis Date     Ankylosing spondylitis (H) 3/1/2017     Anxiety      Arthritis     back, knees     Back pain     possible drug seeking behavior in the past.      Panic attacks      Past Surgical History:   Procedure Laterality Date     ARTHROSCOPY KNEE Right 9/22/2016    Procedure: ARTHROSCOPY KNEE;  Surgeon: Fredo Hughes MD;  Location: [facility] OR     INJECT PARAVERTEBRAL FACET JOINT LUMBAR / SACRAL FIRST Right 11/25/2016    Procedure: INJECT PARAVERTEBRAL FACET JOINT LUMBAR / SACRAL FIRST;  Surgeon: Doretha Magallanes MD;  Location: [facility] OR     ORTHOPEDIC SURGERY      Rt knee X 2     ORTHOPEDIC SURGERY      Lt foot       Objective  /84  Ht 6' 4\" (1.93 m)  Wt 255 lb (115.7 kg)  BMI 31.04 kg/m2    GENERAL APPEARANCE: alert and no acute distress   GAIT: NORMAL  SKIN: no suspicious lesions or rashes  NEURO: Normal strength and tone, mentation intact and speech normal  PSYCH:  mentation appears normal and affect normal/bright  HEENT: no scleral icterus  CV: no extremity edema   RESP: nonlabored breathing    Exam  Left Knee exam    ROM: full        Flexion full, symmetric, no change in pain        Extension full, symmetric, no change in pain      Inspection:       no visible ecchymosis       no visible edema or effusion    Skin:       no visible deformities       well perfused       capillary refill brisk    Patellar Motion:        Crepitus noted in the patellofemoral joint       No pain with patellar translation     Special Tests:        neg (-) Navdeep       neg (-) Lachman       neg (-) varus, no pain        neg (-) valgus, no pain        No pain with active leg raise     Evaluation of ipsilateral kinetic chain  Mini squat - increased pain around the patella with motion, unable to complete the motion.   Single leg squat deferred      Radiology  Reviewed prior pertinent imaging:     KNEE STANDING AP, BILATERAL SUNRISE, BILATERAL LATERAL LEFT   8/8/2017  4:07 " PM      HISTORY: Chronic left knee pain.     COMPARISON: None.         IMPRESSION:   1. Right knee two views: Mild osteoarthritis in the medial knee joint  compartment and patellofemoral joint.  2. Left knee three views: Mild osteoarthritis medial knee joint  compartment and patellofemoral joint. No effusion.     FIDE PATIÑO MD      MR KNEE LEFT WITHOUT CONTRAST   5/25/2017 10:07 AM     HISTORY: Left knee pain.     COMPARISON: Radiographs on 5/17/2017.     TECHNIQUE: Transverse and coronal T2 with fat suppression. Coronal T1.  Sagittal proton density and T2.     FINDINGS:      Medial Meniscus: There is mild myxoid degeneration in the posterior  horn with no tear demonstrated.      Lateral Meniscus: No tear, displaced fragment, or extrusion.       Anterior Cruciate Ligament: Unremarkable.      Posterior Cruciate Ligament: Unremarkable.      Medial Collateral Ligament: Unremarkable.     Lateral Collateral Ligament Complex, Popliteus Tendon: The iliotibial  band, fibular collateral ligament, biceps femoris tendon, and  popliteus tendon are unremarkable.     Osseous and Cartilaginous Structures: No fracture or osseous lesion is  demonstrated. There is moderate marrow edema seen within the anterior  aspect of the lateral femoral condyle, predominantly in the epiphysis  but also extending into the metaphysis. No other abnormal marrow  signal intensity is seen. There is grade 2 chondromalacia throughout  the weightbearing portion of the medial femoral condyle and also in  the medial patellar facet. The remaining cartilage surfaces are well  preserved.      Extensor Mechanism: The quadriceps and patellar tendons are  unremarkable. The medial and lateral patellar retinacula appear  unremarkable.     Joint Space: No joint effusion. No definite loose bodies appreciated.     Additional Findings: There is a just over 1 cm diameter Baker's cyst.  No semimembranosus-tibial collateral ligament or pes anserine  bursitis. No  adjacent soft tissue pathology is seen.         IMPRESSION:  1. Bone contusion of the anterior aspect of the lateral femoral  condyle. No fracture is demonstrated. Although this can be seen  following lateral patellar dislocations, there is no additional  evidence of a recent lateral patellar dislocation. Clinical  correlation is recommended.  2. 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     DAVIS CASTORENA MD    Assessment:  1. Left knee pain, unspecified chronicity    2. Chondromalacia, knee, left        Plan:  Discussed the assessment with the patient.    Regarding right great toe tingling:  Sees pain mgmt for other issues. I deferred to pain mgmt for this for now; there is a recent phone call about possible lumbar injection. Otherwise, Edx testing is consideration. MRI lumbar spine from 2016 demonstrated L5-S1 disc protrusion abutting right S1 nerve root.      Pertinent imaging of the area reviewed with the patient--KNEE.    Discussed:  *Symptom Treatment - Ice, OTCs   *Activity Modification   *Rehab - formal Physical Therapy   *Injection - Corticosteroid injection vs Viscosupplementation (ultrasound)  *Imaging - MRI reviewed   *Support - knee brace, patella support (best after or in conjunction with Physical Therapy)     Topical Treatments: Ice prn   Over the counter medication: Patient's preferred OTC medication as directed on packaging.  Activity Modification: as discussed   Rehab: Physical Therapy: [facility] - [phone]; next step . Recommend maximize PT next. May consider some patellar support taping as well, as we briefly discussed bracing possible.  He wants to try visco. *Referral to Dr. Valladares for ultrasound guided Viscosupplementation .   Follow up: in 4-6 weeks if not improving with Physical Therapy and visco; otherwise, as needed    Questions answered. The patient indicates understanding of these issues and agrees with the plan.     Josias TSAI  DO Danielson CAQ       Disclaimer: This note consists of symbols derived from keyboarding, dictation and/or voice recognition software. As a result, there may be errors in the script that have gone undetected. Please consider this when interpreting information found in this chart.    The information in this document, created by the medical scribe for me, accurately reflects the services I personally performed and the decisions made by me. I have reviewed and approved this document for accuracy.   Josias Danielson DO, CAQ    Again, thank you for allowing me to participate in the care of your patient.        Sincerely,        Josias Danielson DO

## 2018-02-20 NOTE — MR AVS SNAPSHOT
After Visit Summary   2/20/2018    Jailene Breen    MRN: [id number]           Patient Information     Date Of Birth          1965        Visit Information        Provider Department      2/20/2018 10:20 AM Anthony Ball PA-C [facility]        Today's Diagnoses     Acute recurrent maxillary sinusitis    -  1    Viral upper respiratory tract infection        Acute bronchitis, unspecified organism           Follow-ups after your visit        Your next 10 appointments already scheduled     Apr 11, 2018  9:00 AM CDT   Return Visit with JUANITA Ga CNP   [facility] ([facility])    [address] [id number] [phone]              Who to contact     Normal or non-critical lab and imaging results will be communicated to you by 3rdKindhart, letter or phone within 4 business days after the clinic has received the results. If you do not hear from us within 7 days, please contact the clinic through 3rdKindhart or phone. If you have a critical or abnormal lab result, we will notify you by phone as soon as possible.  Submit refill requests through AddFleet or call your pharmacy and they will forward the refill request to us. Please allow 3 business days for your refill to be completed.          If you need to speak with a  for additional information , please call: [phone]             Additional Information About Your Visit        AddFleet Information     AddFleet gives you secure access to your electronic health record. If you see a primary care provider, you can also send messages to your care team and make appointments. If you have questions, please call your primary care clinic.  If you do not have a primary care provider, please call [phone] and they will assist you.        Care EveryWhere ID     This is your Care EveryWhere ID. This could be used by other organizations to access your [facility]  medical records  [id number]        Your Vitals Were     Pulse Temperature Respirations Pulse Oximetry BMI (Body Mass Index)       116 98.6  F (37  C) (Tympanic) 18 97% 31.04 kg/m2        Blood Pressure from Last 3 Encounters:   02/20/18 136/86   02/20/18 122/84   01/31/18 132/85    Weight from Last 3 Encounters:   02/20/18 255 lb (115.7 kg)   02/20/18 255 lb (115.7 kg)   01/31/18 255 lb (115.7 kg)              Today, you had the following     No orders found for display         Today's Medication Changes          These changes are accurate as of 2/20/18 11:03 AM.  If you have any questions, ask your nurse or doctor.               Start taking these medicines.        Dose/Directions    azithromycin 250 MG tablet   Commonly known as:  ZITHROMAX   Used for:  Acute recurrent maxillary sinusitis   Started by:  Anthony Ball PA-C        Two tablets first day, then one tablet daily for four days.   Quantity:  6 tablet   Refills:  0       predniSONE 20 MG tablet   Commonly known as:  DELTASONE   Used for:  Acute bronchitis, unspecified organism, Acute recurrent maxillary sinusitis   Started by:  Anthony Ball PA-C        Dose:  20 mg   Take 1 tablet (20 mg) by mouth daily   Quantity:  5 tablet   Refills:  0            Where to get your medicines      These medications were sent to [facility] [place] - [address]  [address] [place] [address]     Phone:  [phone]     azithromycin 250 MG tablet    predniSONE 20 MG tablet                Primary Care Provider Office Phone # Fax #    JASON Rice [phone] [address] [facility] [address]        Equal Access to Services     [facility]: Hadii kiran pearson hadverno Sosarah, waaxda luqadaha, qaybta kaalmada adeegyada, stephanie paredes. So [facility] [phone].    ATENCIÓN: Si habla español, tiene a carrington disposición servicios gratuitos de asistencia lingüística.  Nathaniel glaser [phone].    We comply with applicable federal civil rights laws and [place] laws. We do not discriminate on the basis of race, color, national origin, age, disability, sex, sexual orientation, or gender identity.            Thank you!     Thank you for choosing [facility]  for your care. Our goal is always to provide you with excellent care. Hearing back from our patients is one way we can continue to improve our services. Please take a few minutes to complete the written survey that you may receive in the mail after your visit with us. Thank you!             Your Updated Medication List - Protect others around you: Learn how to safely use, store and throw away your medicines at www.disposemymeds.org.          This list is accurate as of 2/20/18 11:03 AM.  Always use your most recent med list.                   Brand Name Dispense Instructions for use Diagnosis    azithromycin 250 MG tablet    ZITHROMAX    6 tablet    Two tablets first day, then one tablet daily for four days.    Acute recurrent maxillary sinusitis       buPROPion 150 MG 12 hr tablet    WELLBUTRIN SR    180 tablet    Take 1 tablet (150 mg) by mouth 2 times daily    Anxiety, Tobacco abuse       CELEBREX PO           clonazePAM 1 MG tablet    klonoPIN    12 tablet    Take 0.5-1 tablets (0.5-1 mg) by mouth 2 times daily as needed for anxiety    Anxiety       folic acid 1 MG tablet    FOLVITE     Reported on 5/19/2017        HUMIRA PEN 40 MG/0.8ML pen kit   Generic drug:  adalimumab           IBUPROFEN PO      Take 800 mg by mouth every 6 hours as needed for moderate pain Reported on 5/19/2017        ketoconazole 2 % shampoo    NIZORAL    120 mL    Apply to the affected area and wash off after 5 minutes.use until skin condition is better    Seborrheic dermatitis       methotrexate 2.5 MG tablet CHEMO      6 tablets Reported on 4/3/2017        nortriptyline 50 MG capsule    PAMELOR    90 capsule    Take 1 capsule (50 mg) by  mouth At Bedtime    Anxiety, Other chronic pain       oxyCODONE IR 5 MG tablet    ROXICODONE    45 tablet    Take 1 tablet every 6-8 hours as needed for pain, max of 2 tabs per day but cannot take 2 tabs every day.  Okay to fill on 1/31/2018 and start on 2/4/2018.  To last 30 days.    Chronic pain of left knee, Chronic bilateral low back pain with right-sided sciatica       predniSONE 20 MG tablet    DELTASONE    5 tablet    Take 1 tablet (20 mg) by mouth daily    Acute bronchitis, unspecified organism, Acute recurrent maxillary sinusitis

## 2018-02-28 ENCOUNTER — MYC MEDICAL ADVICE (OUTPATIENT)
Dept: PALLIATIVE MEDICINE | Facility: CLINIC | Age: 53
End: 2018-02-28

## 2018-02-28 DIAGNOSIS — G89.29 CHRONIC PAIN OF LEFT KNEE: ICD-10-CM

## 2018-02-28 DIAGNOSIS — M54.41 CHRONIC BILATERAL LOW BACK PAIN WITH RIGHT-SIDED SCIATICA: ICD-10-CM

## 2018-02-28 DIAGNOSIS — G89.29 CHRONIC BILATERAL LOW BACK PAIN WITH RIGHT-SIDED SCIATICA: ICD-10-CM

## 2018-02-28 DIAGNOSIS — M25.562 CHRONIC PAIN OF LEFT KNEE: ICD-10-CM

## 2018-02-28 NOTE — TELEPHONE ENCOUNTER
Pre-screening Questions for Radiology Injections:    Injection to be done at which interventional clinic site? [facility] [place]    Procedure ordered by Susana Pike    Procedure ordered? Lumbar Epidural Steroid Injection    What insurance would patient like us to bill for this procedure? [employer]      Worker's comp or MVA (motor vehicle accident) -Any injection DO NOT SCHEDULE and route to Janay López.      [employer] insurance - For SI joint injections, DO NOT SCHEDULE and route Yasemin Eldridge. [employer] FREEDOM NO PA REQUIRED EFFECTIVE 11/1/2017      [employer]- MBB's must be scheduled at LEAST two weeks apart      [employer] - Any injection besides hip/shoulder/knee joint DO NOT SCHEDULE and route to Yasemin Eldridge. She will obtain PA and call pt back to schedule procedure or notify pt of denial.        [employer]-PA REQUIRED FOR NON-MAKAYLA OR Joint injections    Any chance of pregnancy? Not Applicable   If YES, do NOT schedule and route to RN pool    Is an  needed? No     Patient has a drive home? (mandatory) YES:     Is patient taking any blood thinners (plavix, coumadin, jantoven, warfarin, heparin, pradaxa or dabigatran )? No   If hold needed, do NOT schedule, route to RN pool     Is patient taking any aspirin products? No     If more than 325mg/day do NOT schedule; route to RN pool     For CERVICAL procedures, hold all aspirin products for 6 days.      Does the patient have a bleeding or clotting disorder? No     If YES, okay to schedule AND route to RN nurse pool    **For any patients with platelet count <100, must be forwarded to provider**    Is patient diabetic?  No  If YES, have them bring their glucometer.    Does patient have an active infection or treated for one within the past week? No     Is patient currently taking any antibiotics?  No     For patients on chronic, preventative, or prophylactic antibiotics, procedures may be scheduled.     For patients on  antibiotics for active or recent infection:    Tye Kenney Burton, Snitzer-antibiotic course must have been completed for 4 days    Dr. Ashford-antibiotic course must have been completed for 7 days    Is patient currently taking any steroid medications? (i.e. Prednisone, Medrol)  No     For patients on steroid medications:    Tye Kenney Burton, [place]itzer-steroid course must have been completed for 4 days    -steroid course must have been completed for 7 days    Reviewed with patient:  If you are started on any steroids or antibiotics between now and your appointment, you must contact us because it may affect our ability to perform your procedure.  Yes    Is patient actively being treated for cancer or immunocompromised? No  If YES, do NOT schedule and route to RN pool     Are you able to get on and off an exam table with minimal or no assistance? Yes  If NO, do NOT schedule and route to RN pool    Are you able to roll over and lay on your stomach with minimal or no assistance? Yes  If NO, do NOT schedule and route to RN pool     Any allergies to contrast dye, iodine, shellfish, or numbing and steroid medications? No  If YES, route to RN pool AND add allergy information to appointment notes    Allergies: Hydrocodone and Remeron soltab      Has the patient had a flu shot or any other vaccinations within 7 days before or after the procedure.  No     Does patient have an MRI/CT?  YES: MRI  (SI joint, hip injections, lumbar sympathetic blocks, and stellate ganglion blocks do not require an MRI)    Was the MRI done w/in the last 3 years?  Yes    Was MRI done at [facility]? Yes      If not, where was it done? N/A       If MRI was not done at [facility], [facility] or [facility] do NOT schedule and route to nursing.  If pt has an imaging disc, the injection may be scheduled but pt has to bring disc to appt. If they show up w/out disc the injection cannot be done    Reminders (please tell patient if  applicable):       Instructed pt to arrive 30 minutes early for IV start if this is for a cervical procedure, ALL sympathetic (stellate ganglion, hypogastric, or lumbar sympathetic block) and all sedation procedures (RFA, spinal cord stimulation trials).  Not Applicable   -IVs are not routinely placed for Dr. Chen cervical cases   -Dr. Holley: IVs for cervical ESIs and cervical TBDs (not CMBBs/facet inj)      If NPO for sedation, informed patient that it is okay to take medications with sips of water (except if they are to hold blood thinners).  Not Applicable   *DO take blood pressure medication if it is prescribed*      If this is for a cervical MAKAYLA, informed patient that aspirin needs to be held for 6 days.   Not Applicable      For all patients not having spinal cord stimulator (SCS) trials or radiofrequency ablations (RFAs), informed patient:    IV sedation is not provided for this procedure.  If you feel that an oral anti-anxiety medication is needed, you can discuss this further with your referring provider or primary care provider.  The Pain Clinic provider will discuss specifics of what the procedure includes at your appointment.  Most procedures last 10-20 minutes.  We use numbing medications to help with any discomfort during the procedure.  Not Applicable      Do not schedule procedures requiring IV placement in the first appointment of the day or first appointment after lunch.       For patients 85 or older we recommend having an adult stay w/ them for the remainder of the day.       Does the patient have any questions?  NO  Janay López  [facility]

## 2018-03-01 NOTE — TELEPHONE ENCOUNTER
Routed to the nursing pool and to the MA pool to process refill(s).   script-oxycodone.    Cha Molina, RN-BSN  [facility]

## 2018-03-01 NOTE — TELEPHONE ENCOUNTER
Medication refill information reviewed.     Due date for Oxycodone is 3/6/2018      Prescriptions prepped for review.     Will route to provider.

## 2018-03-01 NOTE — TELEPHONE ENCOUNTER
Received call from patient requesting refill(s) of oxyCODONE IR (ROXICODONE) 5 MG tablet    Last picked up from pharmacy on 01/31/18    Pt last seen by prescribing provider on 01/31/185  Next appt scheduled for 04/11/18 ( has injection appt prior)- note added to appt to update OA and UDS    Last urine drug screen date 03/31/17  Current opioid agreement on file (completed within the last year) Yes Date of opioid agreement: 04/06/17    Processing (pick one and delete the others):        Pt will  in clinic at [place] location      Will route to nursing pool for review and preparation of prescription(s).

## 2018-03-02 RX ORDER — OXYCODONE HYDROCHLORIDE 5 MG/1
TABLET ORAL
Qty: 45 TABLET | Refills: 0 | Status: SHIPPED | OUTPATIENT
Start: 2018-03-02 | End: 2018-03-20

## 2018-03-02 NOTE — TELEPHONE ENCOUNTER
Script for oxycodone was signed and kept in file folder for patient to  at [facility] 2nd floor. Patient was notified.     Nicolasa Krueger MA

## 2018-03-02 NOTE — TELEPHONE ENCOUNTER
Received call from patient who is wondering if the fill date on this rx can be changed to today. He states he is leaving tomorrow to go to [place] for 4 days and would like to fill it today.       Elana Maldonado    [facility]

## 2018-03-02 NOTE — TELEPHONE ENCOUNTER
Pt came into clinic and this was addressed with provider.     Sondra Hearn RN, [facility]  Pain Clinic Care Coordinator

## 2018-03-02 NOTE — TELEPHONE ENCOUNTER
Signed Prescriptions:                        Disp   Refills    oxyCODONE IR (ROXICODONE) 5 MG tablet      45 tab*0        Sig: Take 1 tablet every 6-8 hours as needed for pain, max           of 2 tabs per day but cannot take 2 tabs every           day.  Okay to fill on 3/4/2018 and start on           3/6/2018.  To last 30 days.  Authorizing Provider: SUSANA PETTY    Signed script placed in touchdown bin at [facility].    Susana Petty APRN CNP FNP RN  [facility]

## 2018-03-04 ENCOUNTER — MYC MEDICAL ADVICE (OUTPATIENT)
Dept: FAMILY MEDICINE | Facility: CLINIC | Age: 53
End: 2018-03-04

## 2018-03-04 DIAGNOSIS — R05.9 COUGH: Primary | ICD-10-CM

## 2018-03-06 RX ORDER — CODEINE PHOSPHATE AND GUAIFENESIN 10; 100 MG/5ML; MG/5ML
1 SOLUTION ORAL EVERY 4 HOURS PRN
Qty: 120 ML | Refills: 0 | Status: SHIPPED | OUTPATIENT
Start: 2018-03-06 | End: 2019-03-27

## 2018-03-15 ENCOUNTER — MYC MEDICAL ADVICE (OUTPATIENT)
Dept: PALLIATIVE MEDICINE | Facility: CLINIC | Age: 53
End: 2018-03-15

## 2018-03-15 ENCOUNTER — RADIANT APPOINTMENT (OUTPATIENT)
Dept: RADIOLOGY | Facility: CLINIC | Age: 53
End: 2018-03-15
Attending: PSYCHIATRY & NEUROLOGY
Payer: COMMERCIAL

## 2018-03-15 ENCOUNTER — RADIOLOGY INJECTION OFFICE VISIT (OUTPATIENT)
Dept: PALLIATIVE MEDICINE | Facility: CLINIC | Age: 53
End: 2018-03-15
Payer: COMMERCIAL

## 2018-03-15 VITALS — DIASTOLIC BLOOD PRESSURE: 89 MMHG | HEART RATE: 77 BPM | OXYGEN SATURATION: 98 % | SYSTOLIC BLOOD PRESSURE: 139 MMHG

## 2018-03-15 DIAGNOSIS — M54.16 LUMBAR RADICULOPATHY: Primary | ICD-10-CM

## 2018-03-15 DIAGNOSIS — M45.9 ANKYLOSING SPONDYLITIS, UNSPECIFIED SITE OF SPINE (H): Primary | ICD-10-CM

## 2018-03-15 DIAGNOSIS — M54.16 LUMBAR RADICULOPATHY: ICD-10-CM

## 2018-03-15 PROCEDURE — 64483 NJX AA&/STRD TFRM EPI L/S 1: CPT | Mod: RT | Performed by: PSYCHIATRY & NEUROLOGY

## 2018-03-15 ASSESSMENT — PAIN SCALES - GENERAL: PAINLEVEL: EXTREME PAIN (9)

## 2018-03-15 NOTE — PROGRESS NOTES
Pre procedure Diagnosis: lumbar radiculopathy    Post procedure Diagnosis: Same  Procedure performed: Right L5 Transforaminal Epidural Steroid Injection  Anesthesia: None  Complications: None  Operators: Ashlyn Gamble MD and Samuel Sanchez DO     Indications:   Jailene Breen is a 52 year old male with a history of Rheumatoid arthritis was sent by Susana Pike NP  for epidural steroid injection.  They have a history of low back pain for a few years with radiating pain down his lateral leg into this first phalangeal joint.  Patient admits to numbness/tingling in this distribution as well.  Exam shows pain to palpation of his lumbar paraspinals and positive SLR on the right.  He has decreased sensation to light touch in his 1st webspace.  He has had previous epidurals, physical therapy, and oral medications with minimal relief.     MRI was done on 10/14/16 which showed       Options/alternatives, benefits and risks were discussed with the patient including bleeding, infection, no pain relief, tissue trauma, exposure to radiation, reaction to medications, spinal cord injury, dural puncture, weakness, numbness and headache.  Questions were answered to his satisfaction and he agrees to proceed. Voluntary informed consent was obtained and signed.     Vitals were reviewed: Yes  Allergies were reviewed:  Yes   Medications were reviewed:  Yes   Pre-procedure pain score: 9/10    Procedure:  After getting informed consent, patient was brought into the procedure suite and was placed in a prone position on the procedure table.   A Pause for the Cause was performed.  Patient was prepped and draped in sterile fashion.     After identifying the right L5 neuroforamen, the C-arm was rotated to a right lateral oblique angle.  A total of 3ml of Lidocaine 1% was used to anesthetize the skin and the needle track at a skin entry site coaxial with the fluoroscopy beam, and overriding the superior aspect of the neuroforamen.  A 22 gauge  3.5 inch spinal needle was advanced under intermittent fluoroscopy until it entered the foramen superiorly.    The position was then inspected from anteroposterior and lateral views, and the needle adjusted appropriately.  A total of 1.5ml of Omnipaque-300 was injected, confirming appropriate position, with spread into the nerve root sheath and the epidural space, with no intravascular uptake.   8.5ml was wasted.    1.5 ml of 0.5% Bupivacaine with 10mg of Dexamethasone was injected.  The needle was flushed with lidocaine 1% outside of the epidural space and removed.    During the procedure, there was a paresthesia related to pressure but resolved once injection completed.     Hemostasis was achieved, the area was cleaned, and bandaids were placed when appropriate.  The patient tolerated the procedure well, and was taken to the recovery room.    Images were saved to PACS.    Post-procedure pain score: 0/10  Follow-up includes:   -f/u phone call in one week  -f/u with referring provider    Ashlyn Gamble MD  [facility]

## 2018-03-15 NOTE — MR AVS SNAPSHOT
After Visit Summary   3/15/2018    Jailene Breen    MRN: [id number]           Patient Information     Date Of Birth          1965        Visit Information        Provider Department      3/15/2018 8:45 AM Silvia Gamble MD [facility]        Care Instructions    [facility]   Procedure Discharge Instructions    Today you saw: Dr. Silvia Holland    You had an:  Epidural steroid injection      Medications used:  Lidocaine   Bupivacaine   Dexamethasone Omnipaque            Be cautious when walking. Numbness and/or weakness in the lower extremities may occur for up to 6-8 hours after the procedure due to effect of the local anesthetic    Do not drive for 6 hours. The effect of the local anesthetic could slow your reflexes.     You may resume your regular activities after 24 hours    Avoid strenuous activity for the first 24 hours    You may shower, however avoid swimming, tub baths or hot tubs for 24 hours following your procedure    You may have a mild to moderate increase in pain for several days following the injection.    It may take up to 14 days for the steroid medication to start working although you may feel the effect as early as a few days after the procedure.       You may use ice packs for 10-15 minutes, 3 to 4 times a day at the injection site for comfort    Do not use heat to painful areas for 6 to 8 hours. This will give the local anesthetic time to wear off and prevent you from accidentally burning your skin.     You may use anti-inflammatory medications (such as Ibuprofen or Aleve or Advil) or Tylenol for pain control if necessary    If you were fasting, you may resume your normal diet and medications after the procedure    If you experience any of the following, call the pain center nursing line during work hours at [phone] or the after hours provider line at [phone]:  -Fever over 100 degree F  -Swelling, bleeding, redness,  drainage, warmth at the injection site  -Progressive weakness or numbness in your legs   -Loss of bowel or bladder function  -Unusual new onset of pain that is not improving      Phone #s:  Appointment line: [phone];  Nurse line: [phone]              Follow-ups after your visit        Your next 10 appointments already scheduled     Apr 11, 2018  9:00 AM CDT   Return Visit with JUANITA Ga CNP   [facility] ([facility])    [address]  [place] [address] [phone]              Who to contact     If you have questions or need follow up information about today's clinic visit or your schedule please contact [facility] directly at [phone].  Normal or non-critical lab and imaging results will be communicated to you by MyChart, letter or phone within 4 business days after the clinic has received the results. If you do not hear from us within 7 days, please contact the clinic through MyChart or phone. If you have a critical or abnormal lab result, we will notify you by phone as soon as possible.  Submit refill requests through [employer] or call your pharmacy and they will forward the refill request to us. Please allow 3 business days for your refill to be completed.          Additional Information About Your Visit        eBillmehart Information     [employer] gives you secure access to your electronic health record. If you see a primary care provider, you can also send messages to your care team and make appointments. If you have questions, please call your primary care clinic.  If you do not have a primary care provider, please call [phone] and they will assist you.        Care EveryWhere ID     This is your Care EveryWhere ID. This could be used by other organizations to access your [facility] medical records  [id number]        Your Vitals Were     Pulse                   81            Blood Pressure from Last 3 Encounters:    03/15/18 (!) 149/93   02/20/18 136/86   02/20/18 122/84    Weight from Last 3 Encounters:   02/20/18 115.7 kg (255 lb)   02/20/18 115.7 kg (255 lb)   01/31/18 115.7 kg (255 lb)              Today, you had the following     No orders found for display       Primary Care Provider Office Phone # Fax #    Aiden JASON Meyer [phone] [address] [facility] [address]        Equal Access to Services     [facility]: Hadii aad ku hadasho Soomaali, waaxda luqadaha, qaybta kaalmada adeegyada, waxbeny davis . So [facility] [phone].    ATENCIÓN: Si habla español, tiene a carrington disposición servicios gratuitos de asistencia lingüística. [facility] [phone].    We comply with applicable federal civil rights laws and [place] laws. We do not discriminate on the basis of race, color, national origin, age, disability, sex, sexual orientation, or gender identity.            Thank you!     Thank you for choosing [facility]  for your care. Our goal is always to provide you with excellent care. Hearing back from our patients is one way we can continue to improve our services. Please take a few minutes to complete the written survey that you may receive in the mail after your visit with us. Thank you!             Your Updated Medication List - Protect others around you: Learn how to safely use, store and throw away your medicines at www.disposemymeds.org.          This list is accurate as of 3/15/18  9:23 AM.  Always use your most recent med list.                   Brand Name Dispense Instructions for use Diagnosis    azithromycin 250 MG tablet    ZITHROMAX    6 tablet    Two tablets first day, then one tablet daily for four days.    Acute recurrent maxillary sinusitis       buPROPion 150 MG 12 hr tablet    WELLBUTRIN SR    180 tablet    Take 1 tablet (150 mg) by mouth 2 times daily    Anxiety, Tobacco abuse       CELEBREX PO           clonazePAM 1 MG  tablet    klonoPIN    12 tablet    Take 0.5-1 tablets (0.5-1 mg) by mouth 2 times daily as needed for anxiety    Anxiety       folic acid 1 MG tablet    FOLVITE     Reported on 5/19/2017        guaiFENesin-codeine 100-10 MG/5ML Soln solution    ROBITUSSIN AC    120 mL    Take 5 mLs by mouth every 4 hours as needed for cough    Cough       HUMIRA PEN 40 MG/0.8ML pen kit   Generic drug:  adalimumab           IBUPROFEN PO      Take 800 mg by mouth every 6 hours as needed for moderate pain Reported on 5/19/2017        ketoconazole 2 % shampoo    NIZORAL    120 mL    Apply to the affected area and wash off after 5 minutes.use until skin condition is better    Seborrheic dermatitis       methotrexate 2.5 MG tablet CHEMO      6 tablets Reported on 4/3/2017        nortriptyline 50 MG capsule    PAMELOR    90 capsule    Take 1 capsule (50 mg) by mouth At Bedtime    Anxiety, Other chronic pain       oxyCODONE IR 5 MG tablet    ROXICODONE    45 tablet    Take 1 tablet every 6-8 hours as needed for pain, max of 2 tabs per day but cannot take 2 tabs every day.  Okay to fill on 3/4/2018 and start on 3/6/2018.  To last 30 days.    Chronic pain of left knee, Chronic bilateral low back pain with right-sided sciatica       predniSONE 20 MG tablet    DELTASONE    5 tablet    Take 1 tablet (20 mg) by mouth daily    Acute bronchitis, unspecified organism, Acute recurrent maxillary sinusitis

## 2018-03-15 NOTE — PATIENT INSTRUCTIONS
[facility]   Procedure Discharge Instructions    Today you saw: Dr. Silvia Holland    You had an:  Epidural steroid injection      Medications used:  Lidocaine   Bupivacaine   Dexamethasone Omnipaque            Be cautious when walking. Numbness and/or weakness in the lower extremities may occur for up to 6-8 hours after the procedure due to effect of the local anesthetic    Do not drive for 6 hours. The effect of the local anesthetic could slow your reflexes.     You may resume your regular activities after 24 hours    Avoid strenuous activity for the first 24 hours    You may shower, however avoid swimming, tub baths or hot tubs for 24 hours following your procedure    You may have a mild to moderate increase in pain for several days following the injection.    It may take up to 14 days for the steroid medication to start working although you may feel the effect as early as a few days after the procedure.       You may use ice packs for 10-15 minutes, 3 to 4 times a day at the injection site for comfort    Do not use heat to painful areas for 6 to 8 hours. This will give the local anesthetic time to wear off and prevent you from accidentally burning your skin.     You may use anti-inflammatory medications (such as Ibuprofen or Aleve or Advil) or Tylenol for pain control if necessary    If you were fasting, you may resume your normal diet and medications after the procedure    If you experience any of the following, call the pain center nursing line during work hours at [phone] or the after hours provider line at [phone]:  -Fever over 100 degree F  -Swelling, bleeding, redness, drainage, warmth at the injection site  -Progressive weakness or numbness in your legs   -Loss of bowel or bladder function  -Unusual new onset of pain that is not improving      Phone #s:  Appointment line: [phone];  Nurse line: [phone]

## 2018-03-15 NOTE — NURSING NOTE
"Chief Complaint   Patient presents with     Pain       Initial BP (!) 149/93  Pulse 81 Estimated body mass index is 31.04 kg/(m^2) as calculated from the following:    Height as of 2/20/18: 1.93 m (6' 4\").    Weight as of 2/20/18: 115.7 kg (255 lb).  Medication Reconciliation: complete     Pre-procedure Intake    Have you been fasting? NA    If yes, for how long? No     Are you taking a prescribed blood thinner such as coumadin, Plavix, Xarelto?    No    If yes, when did you take your last dose? No     Do you take aspirin?  No    If cervical procedure, have you held aspirin for 6 days?   NA    Do you have any allergies to contrast dye, iodine, steroid and/or numbing medications?  NO    Are you currently taking antibiotics or have an active infection?  NO    Have you had a fever/elevated temperature within the past week? NO    Are you currently taking oral steroids? NO    Do you have a ? Yes       Are you pregnant or breastfeeding?  Not Applicable    Are the vital signs normal?  Yes    Nicolasa Krueger MA            "

## 2018-03-15 NOTE — NURSING NOTE
Discharge Information    IV Discontiued Time:  NA    Amount of Fluid Infused:  NA    Discharge Criteria = When patient returns to baseline or as per MD order    Consciousness:  Pt is fully awake    Circulation:  BP +/- 20% of pre-procedure level    Respiration:  Patient is able to breathe deeply    O2 Sat:  Patient is able to maintain O2 Sat >92% on room air    Activity:  Moves 4 extremities on command    Ambulation:  Patient is able to stand and walk or stand and pivot into wheelchair    Dressing:  Clean/dry or No Dressing    Notes:   Discharge instructions and AVS given to patient    Patient meets criteria for discharge?  YES    Admitted to PCU?  No    Responsible adult present to accompany patient home?  Yes    Signature/Title:    Rosetta Hearn RN Care Coordinator  [facility]

## 2018-03-16 ENCOUNTER — MYC MEDICAL ADVICE (OUTPATIENT)
Dept: PALLIATIVE MEDICINE | Facility: CLINIC | Age: 53
End: 2018-03-16

## 2018-03-16 DIAGNOSIS — M25.562 CHRONIC PAIN OF LEFT KNEE: ICD-10-CM

## 2018-03-16 DIAGNOSIS — M54.41 CHRONIC BILATERAL LOW BACK PAIN WITH RIGHT-SIDED SCIATICA: ICD-10-CM

## 2018-03-16 DIAGNOSIS — G89.29 CHRONIC BILATERAL LOW BACK PAIN WITH RIGHT-SIDED SCIATICA: ICD-10-CM

## 2018-03-16 DIAGNOSIS — G89.29 CHRONIC PAIN OF LEFT KNEE: ICD-10-CM

## 2018-03-16 RX ORDER — OXYCODONE HYDROCHLORIDE 5 MG/1
TABLET ORAL
Qty: 10 TABLET | Refills: 0 | Status: SHIPPED | OUTPATIENT
Start: 2018-03-16 | End: 2018-04-10

## 2018-03-16 NOTE — TELEPHONE ENCOUNTER
I do not see documentation of concerns about opioids/misuse.    It should be very clear that meds should not be opened over sinks/toilets, etc that they can be lost in.      I am willing to do a supply for 5 days (I will do #10 tabs), and then Susana will need to make further decisions when she returns next week.    As no providers in [place], he would need to come to [place] pharmacy to get meds.  Otherwise he will need to wait until Monday    Please see if this is what he wants to do, and I will sign script.    Ashlyn Gamble MD  [facility]

## 2018-03-16 NOTE — TELEPHONE ENCOUNTER
Tariq message from patient on 3/16 at 0123:    Hi, the numbing medication has totally wore off and the main is so intense, it even seems to wrap around my hip into my growing like I got kicked there and almost puts me in tears, WOW  ---------  Pt received an LESI yesterday from Dr. Gamble.     Will route to nurse pool for further review.     Chrissie Milan, KERLINEN, RN-BC  Patient Care Supervisor/Care Coordinator  [facility]

## 2018-03-16 NOTE — TELEPHONE ENCOUNTER
Thank you, information noted.    See documentation in other encounter about meds.        Ashlyn Gamble MD  [facility]

## 2018-03-16 NOTE — TELEPHONE ENCOUNTER
Message sent to patient:    Hi Les,     It is not out of the ordinary for a flare to happen for a few days after an injection. There is often a gap between when the numbing medication wears off and the steroid starts to work. You may use anti-inflammatory medications, ice, heat, rest, gentle stretching, over the counter lidocaine 4% cream or patches and even distraction to help with the flare.  And, continue hoping that the steroid soaks in sooner than later. As you were told yesterday, it can take up to 14 days for the steroid to start working.  There are no easy answers to your complex pain situation so continue to use what you have learned over the years to help ease the pain.  Please review the discharge instructions that you received yesterday for additional reminders of what to do.   I will make sure that Dr. Gamble is made aware of your message.     Hang in there,     KERLINE ParkerN, RN-BC  Patient Care Supervisor/Care Coordinator  [facility]

## 2018-03-16 NOTE — TELEPHONE ENCOUNTER
Signed Prescriptions:                        Disp   Refills    oxyCODONE IR (ROXICODONE) 5 MG tablet      10 tab*0        Sig: Take 1 tablet every 6-8 hours as needed for pain, max           of 2 tabs per day but cannot take 2 tabs every           day.  Authorizing Provider: MARKIE NOE MD on 3/16/2018 at 2:28 PM

## 2018-03-16 NOTE — TELEPHONE ENCOUNTER
[email or web address] message from patient on 3/16 at 1350:    It was Monday night was my last dose if soggy pills.   ---------------------  Called pt. No answer. LM that I was calling about his pain medications and that I will send a [employer] message and watch for his response.     Hi Les,     Dr. Gamble reviewed this message and is willing to do a 5 day supply of medication, #10 tabs, for you to use until Susana returns next and can determine next steps.  She wanted me to emphasize that medication containers should never be opened over sinks/toilets, etc. Dr. Gamble is at [facility] location in [place] where you could  the prescription today if interested.  We are open until 4:30 but we could deliver the prescription to the [facility] pharmacy where you could pick it up later today or tomorrow.  If going to [place] won't work for you, you will need to wait until Monday to receive a prescription.  Once I hear back from you I will let Dr. Gamble know the plan.       Thank you,     Chrissie Milan, BSN, RN-BC  Patient Care Supervisor/Care Coordinator  [facility] [place]

## 2018-03-16 NOTE — TELEPHONE ENCOUNTER
Reviewed chart.   oxyCODONE IR (ROXICODONE) 5 MG tablet 45 tablet 0 3/2/2018  No      Sig: Take 1 tablet every 6-8 hours as needed for pain, max of 2 tabs per day but cannot take 2 tabs every day.  Okay to fill on 3/4/2018 and start on 3/6/2018.  To last 30 days.     Message sent to pt:    Lopez Swift,     I am sorry that you have been ill and it is unfortunate that you have spilled your pain medication.  When did you take the last dose of the oxycodone? It looks like your use the oxycodone sparingly and have 45 tabs to last for 30 days.  Based on the last prescription, your current supply would last until 4/5/18. Susana Pike CNP is out of the office until next Tuesday, 3/20, and there are no providers at the [place] or [facility] locations today. I will route this update to a covering provider to see if there are any other recommendations for you at this time.     Take care,     Chrissie Milan, KERLINEN, RN-BC  Patient Care Supervisor/Care Coordinator  [facility]

## 2018-03-16 NOTE — TELEPHONE ENCOUNTER
See MyChart encounter dated 3/15 for more information.  Synopsis:      Pt spilled oxycodone pills in the sink    Dr. Gamble wrote Rx for #10 tabs that pt picked up on Friday 3/16 at [place] to cover him until Susana Pike CNP returned and could review plan and potential need for earlier refill.     Lost supply should have lasted until 4/5.     Pt scheduled for a return visit on 4/11/18.     Will have Susana Pike CNP review when she returns.     KERLINE ParkerN, RN-BC  Patient Care Supervisor/Care Coordinator  [facility]

## 2018-03-16 NOTE — TELEPHONE ENCOUNTER
Thank you and I really have learned that lesson. I will come in now to . Thanks again.  ------------  Message sent to pt:    OK. The address is [address], [address], [place] [address]. It's in the [facility].       Chrissie   ----------  Rx prepped for review by Dr. Gamble.     Chrissie Milan, BSN, RN-BC  Patient Care Supervisor/Care Coordinator  [facility] [place]

## 2018-03-16 NOTE — TELEPHONE ENCOUNTER
Tariq message from patient on 3/15 at 1042:      Hi, the past 4 weeks I've been really sick and haven't been able to take my humira and pretty much have had va ra flare up this past week where I was trying to open my pain meds and literally spilled them all over my sink in dirty wet dishes.  I have none now and I'm hurting.  ----------  Will route to the nurse pool for further review.     Chrissie Milan, KERLINEN, RN-BC  Patient Care Supervisor/Care Coordinator  [facility] [place]

## 2018-03-20 ENCOUNTER — MYC MEDICAL ADVICE (OUTPATIENT)
Dept: PALLIATIVE MEDICINE | Facility: CLINIC | Age: 53
End: 2018-03-20

## 2018-03-20 RX ORDER — OXYCODONE HYDROCHLORIDE 5 MG/1
TABLET ORAL
Qty: 45 TABLET | Refills: 0 | Status: SHIPPED | OUTPATIENT
Start: 2018-03-20 | End: 2018-04-11

## 2018-03-20 NOTE — TELEPHONE ENCOUNTER
Called pt and L/Bebetoith providers message and stated the script will be placed up at the . Stated to call if he had questions.     Sondra Hearn RN, [facility]  Pain Clinic Care Coordinator

## 2018-03-20 NOTE — TELEPHONE ENCOUNTER
Signed Prescriptions:                        Disp   Refills    oxyCODONE IR (ROXICODONE) 5 MG tablet      45 tab*0        Sig: Take 1 tablet every 6-8 hours as needed for pain, max           of 2 tabs per day but cannot take 2 tabs every           day.  Okay to fill on 3/20/2018 and start on           3/21/2018. To last 30 days. No further early           refills  Authorizing Provider: TAMICA PETTY    I will give him a new monthly script. I will NOT refill his medication early in the future. He cannot take 2 tablets every day, he gets #45 tabs per month.     Signed script given to Sondra Hearn RN at [facility]    Tamica GRANT, RN CNP, FNP  [facility] [place]

## 2018-03-20 NOTE — TELEPHONE ENCOUNTER
Per patient myChart message:  From: Jailene Breen      Created: 3/20/2018 2:24 PM      Hi, I was wondering if Susana knows what happened and that I only have enough to get through today.                  Pt is correct.  He would be out today.    Routed to JUANITA Ramon CNP.    Cha Molina RN-BSN  [facility]

## 2018-03-20 NOTE — TELEPHONE ENCOUNTER
See the 3/15 and 3/16 mychart encounters for more information.    Cha Molina RN-BSN  [facility]

## 2018-03-21 NOTE — TELEPHONE ENCOUNTER
Noted.     Sondra Hearn RN, [facility]  Pain Clinic Care Coordinator

## 2018-03-22 PROBLEM — M25.562 ACUTE PAIN OF LEFT KNEE: Status: RESOLVED | Noted: 2017-06-27 | Resolved: 2018-03-22

## 2018-03-26 ENCOUNTER — MYC MEDICAL ADVICE (OUTPATIENT)
Dept: FAMILY MEDICINE | Facility: CLINIC | Age: 53
End: 2018-03-26

## 2018-03-26 DIAGNOSIS — F41.1 GAD (GENERALIZED ANXIETY DISORDER): Primary | ICD-10-CM

## 2018-03-26 RX ORDER — CLONAZEPAM 1 MG/1
0.5-1 TABLET ORAL 2 TIMES DAILY PRN
Qty: 10 TABLET | Refills: 0 | Status: SHIPPED | OUTPATIENT
Start: 2018-03-26 | End: 2018-05-24

## 2018-03-26 NOTE — TELEPHONE ENCOUNTER
PCP to advise if patient to be seen for a refill as note on snap shot indicates that patient is monitored for anxiety.    Agnes Byrd RN

## 2018-03-26 NOTE — TELEPHONE ENCOUNTER
Rx for Small [facility] will be [facility] front[facility]   reviewed.      Ruperto Resendez PA-C

## 2018-04-11 ENCOUNTER — TELEPHONE (OUTPATIENT)
Dept: PALLIATIVE MEDICINE | Facility: CLINIC | Age: 53
End: 2018-04-11

## 2018-04-11 ENCOUNTER — OFFICE VISIT (OUTPATIENT)
Dept: PALLIATIVE MEDICINE | Facility: CLINIC | Age: 53
End: 2018-04-11
Payer: COMMERCIAL

## 2018-04-11 VITALS
SYSTOLIC BLOOD PRESSURE: 138 MMHG | WEIGHT: 263 LBS | HEART RATE: 75 BPM | BODY MASS INDEX: 32.01 KG/M2 | DIASTOLIC BLOOD PRESSURE: 86 MMHG

## 2018-04-11 DIAGNOSIS — G89.29 CHRONIC PAIN OF LEFT KNEE: ICD-10-CM

## 2018-04-11 DIAGNOSIS — Z79.891 ENCOUNTER FOR LONG-TERM OPIATE ANALGESIC USE: ICD-10-CM

## 2018-04-11 DIAGNOSIS — M54.59 LUMBAR FACET JOINT PAIN: ICD-10-CM

## 2018-04-11 DIAGNOSIS — M47.816 LUMBAR SPONDYLOSIS: Primary | ICD-10-CM

## 2018-04-11 DIAGNOSIS — G89.4 CHRONIC PAIN SYNDROME: ICD-10-CM

## 2018-04-11 DIAGNOSIS — M25.562 CHRONIC PAIN OF LEFT KNEE: ICD-10-CM

## 2018-04-11 DIAGNOSIS — M53.3 SACROILIAC JOINT DYSFUNCTION OF LEFT SIDE: ICD-10-CM

## 2018-04-11 DIAGNOSIS — G89.29 CHRONIC BILATERAL LOW BACK PAIN WITH RIGHT-SIDED SCIATICA: ICD-10-CM

## 2018-04-11 DIAGNOSIS — M54.41 CHRONIC BILATERAL LOW BACK PAIN WITH RIGHT-SIDED SCIATICA: ICD-10-CM

## 2018-04-11 PROCEDURE — 99000 SPECIMEN HANDLING OFFICE-LAB: CPT | Performed by: NURSE PRACTITIONER

## 2018-04-11 PROCEDURE — 99214 OFFICE O/P EST MOD 30 MIN: CPT | Performed by: NURSE PRACTITIONER

## 2018-04-11 PROCEDURE — 80307 DRUG TEST PRSMV CHEM ANLYZR: CPT | Mod: 90 | Performed by: NURSE PRACTITIONER

## 2018-04-11 RX ORDER — OXYCODONE HYDROCHLORIDE 5 MG/1
TABLET ORAL
Qty: 45 TABLET | Refills: 0 | Status: SHIPPED | OUTPATIENT
Start: 2018-04-11 | End: 2018-05-14

## 2018-04-11 ASSESSMENT — PAIN SCALES - GENERAL: PAINLEVEL: EXTREME PAIN (9)

## 2018-04-11 NOTE — MR AVS SNAPSHOT
After Visit Summary   4/11/2018    Jailene Breen    MRN: [id number]           Patient Information     Date Of Birth          1965        Visit Information        Provider Department      4/11/2018 9:00 AM Susana Pike APRN [facility]        Today's Diagnoses     Encounter for long-term opiate analgesic use    -  1    Lumbar spondylosis        Lumbar facet joint pain        Sacroiliac joint dysfunction of left side        Chronic pain of left knee        Chronic bilateral low back pain with right-sided sciatica          Care Instructions    PLAN:  1. Medications:   2. Continue oxycodone  3. Procedures: schedule left lumbar medial branch blocks and proceed to lumbar RFA as indicated, our office will contact you  4. Follow-up with me in 12 weeks.   5. Urine drug screen today, last took Oxycodone 2 days ago, does not take it every day  6. Re-signed opiate agree        ----------------------------------------------------------------  Nurse Triage line:  [phone]   Call this number with any questions or concerns. You may leave a detailed message anytime. Calls are typically returned Monday through Friday between 8 AM and 4:30 PM. We usually get back to you within 2 business days depending on the issue/request.       Medication refills:    For non-narcotic medications, call your pharmacy directly to request a refill. The pharmacy will contact the [facility] for authorization. Please allow 3-4 days for these refills to be processed.     For narcotic refills, call the nurse triage line or send a Diamond Mind message. Please contact us 7-10 days before your refill is due. The message MUST include the name of the specific medication(s) requested and how you would like to receive the prescription(s). The options are as follows:    Pain Clinic staff can mail the prescription to your pharmacy. Please tell us the name of the pharmacy.    You may pick the prescription up at  the Pain Clinic (tell us the location) or during a clinic visit with your pain provider    Pain Clinic staff can deliver the prescription to the [place] pharmacy in the clinic building. Please tell us the location.      Scheduling number: [phone].  Call this number to schedule or change appointments.    We believe regular attendance is key to your success in our program.    Any time you are unable to keep your appointment we ask that you call us at least 24 hours in advance to let us know. This will allow us to offer the appointment time to another patient.               Follow-ups after your visit        Additional Services     PAIN INJECTION EVAL/TREAT/FOLLOW UP                 Future tests that were ordered for you today     Open Future Orders        Priority Expected Expires Ordered    Drug Screen Comprehensive , Urine with Reported Meds (Pain Care Package) Routine  4/11/2019 4/11/2018            Who to contact     If you have questions or need follow up information about today's clinic visit or your schedule please contact [facility] directly at [phone].  Normal or non-critical lab and imaging results will be communicated to you by Cylandehart, letter or phone within 4 business days after the clinic has received the results. If you do not hear from us within 7 days, please contact the clinic through Cylandehart or phone. If you have a critical or abnormal lab result, we will notify you by phone as soon as possible.  Submit refill requests through [employer] or call your pharmacy and they will forward the refill request to us. Please allow 3 business days for your refill to be completed.          Additional Information About Your Visit        CylandeharExposed Vocals Information     [employer] gives you secure access to your electronic health record. If you see a primary care provider, you can also send messages to your care team and make appointments. If you have questions, please call your primary care clinic.  If  you do not have a primary care provider, please call [phone] and they will assist you.        Care EveryWhere ID     This is your Care EveryWhere ID. This could be used by other organizations to access your [place] medical records  [id number]        Your Vitals Were     Pulse BMI (Body Mass Index)                75 32.01 kg/m2           Blood Pressure from Last 3 Encounters:   04/11/18 138/86   03/15/18 139/89   02/20/18 136/86    Weight from Last 3 Encounters:   04/11/18 119.3 kg (263 lb)   02/20/18 115.7 kg (255 lb)   02/20/18 115.7 kg (255 lb)              We Performed the Following     PAIN INJECTION EVAL/TREAT/FOLLOW UP          Today's Medication Changes          These changes are accurate as of 4/11/18  9:57 AM.  If you have any questions, ask your nurse or doctor.               These medicines have changed or have updated prescriptions.        Dose/Directions    oxyCODONE IR 5 MG tablet   Commonly known as:  ROXICODONE   This may have changed:  additional instructions   Used for:  Chronic pain of left knee, Chronic bilateral low back pain with right-sided sciatica   Changed by:  Susana Pike APRN CNP        Take 1 tablet every 6-8 hours as needed for pain, max of 2 tabs per day but cannot take 2 tabs every day.  Okay to fill on 4/18/2018 and start on 4/20/2018. To last 30 days. No further early refills   Quantity:  45 tablet   Refills:  0            Where to get your medicines      Some of these will need a paper prescription and others can be bought over the counter.  Ask your nurse if you have questions.     Bring a paper prescription for each of these medications     oxyCODONE IR 5 MG tablet                Primary Care Provider Office Phone # Fax #    JASON Rice [phone] [address] [facility] [address]        Equal Access to Services     [facility] TEDDY AH: John Myrick, su galaviz, stephanie mejia  rocio jacilaz bricecandida davis ah. So [facility] [phone].    ATENCIÓN: Si tomila milana, tiene a carrington disposición servicios gratuitos de asistencia lingüística. Nathaniel glaser [phone].    We comply with applicable federal civil rights laws and [place] laws. We do not discriminate on the basis of race, color, national origin, age, disability, sex, sexual orientation, or gender identity.            Thank you!     Thank you for choosing [facility]  for your care. Our goal is always to provide you with excellent care. Hearing back from our patients is one way we can continue to improve our services. Please take a few minutes to complete the written survey that you may receive in the mail after your visit with us. Thank you!             Your Updated Medication List - Protect others around you: Learn how to safely use, store and throw away your medicines at www.disposemymeds.org.          This list is accurate as of 4/11/18  9:57 AM.  Always use your most recent med list.                   Brand Name Dispense Instructions for use Diagnosis    buPROPion 150 MG 12 hr tablet    WELLBUTRIN SR    180 tablet    Take 1 tablet (150 mg) by mouth 2 times daily    Anxiety, Tobacco abuse       CELEBREX PO           clonazePAM 1 MG tablet    klonoPIN    10 tablet    Take 0.5-1 tablets (0.5-1 mg) by mouth 2 times daily as needed for anxiety    JEFF (generalized anxiety disorder)       folic acid 1 MG tablet    FOLVITE     Reported on 5/19/2017        guaiFENesin-codeine 100-10 MG/5ML Soln solution    ROBITUSSIN AC    120 mL    Take 5 mLs by mouth every 4 hours as needed for cough    Cough       HUMIRA PEN 40 MG/0.8ML pen kit   Generic drug:  adalimumab           IBUPROFEN PO      Take 800 mg by mouth every 6 hours as needed for moderate pain Reported on 5/19/2017        ketoconazole 2 % shampoo    NIZORAL    120 mL    Apply to the affected area and wash off after 5 minutes.use until skin condition is better    Seborrheic  dermatitis       methotrexate 2.5 MG tablet CHEMO      6 tablets Reported on 4/3/2017        nortriptyline 50 MG capsule    PAMELOR    90 capsule    Take 1 capsule (50 mg) by mouth At Bedtime    Anxiety, Other chronic pain       oxyCODONE IR 5 MG tablet    ROXICODONE    45 tablet    Take 1 tablet every 6-8 hours as needed for pain, max of 2 tabs per day but cannot take 2 tabs every day.  Okay to fill on 4/18/2018 and start on 4/20/2018. To last 30 days. No further early refills    Chronic pain of left knee, Chronic bilateral low back pain with right-sided sciatica       predniSONE 20 MG tablet    DELTASONE    5 tablet    Take 1 tablet (20 mg) by mouth daily    Acute bronchitis, unspecified organism, Acute recurrent maxillary sinusitis

## 2018-04-11 NOTE — PATIENT INSTRUCTIONS
PLAN:  1. Medications:   2. Continue oxycodone  3. Procedures: schedule left lumbar medial branch blocks and proceed to lumbar RFA as indicated, our office will contact you  4. Follow-up with me in 12 weeks.   5. Urine drug screen today, last took Oxycodone 2 days ago, does not take it every day  6. Re-signed opiate agree        ----------------------------------------------------------------  Nurse Triage line:  [phone]   Call this number with any questions or concerns. You may leave a detailed message anytime. Calls are typically returned Monday through Friday between 8 AM and 4:30 PM. We usually get back to you within 2 business days depending on the issue/request.       Medication refills:    For non-narcotic medications, call your pharmacy directly to request a refill. The pharmacy will contact the [facility] for authorization. Please allow 3-4 days for these refills to be processed.     For narcotic refills, call the nurse triage line or send a [employer] message. Please contact us 7-10 days before your refill is due. The message MUST include the name of the specific medication(s) requested and how you would like to receive the prescription(s). The options are as follows:    Pain Clinic staff can mail the prescription to your pharmacy. Please tell us the name of the pharmacy.    You may pick the prescription up at the Pain Clinic (tell us the location) or during a clinic visit with your pain provider    Pain Clinic staff can deliver the prescription to the [place] pharmacy in the clinic building. Please tell us the location.      Scheduling number: [phone].  Call this number to schedule or change appointments.    We believe regular attendance is key to your success in our program.    Any time you are unable to keep your appointment we ask that you call us at least 24 hours in advance to let us know. This will allow us to offer the appointment time to another patient.

## 2018-04-11 NOTE — LETTER
[facility]    04/11/18    Patient: Jailene Breen  YOB: 1965  Medical Record Number: [id number]                                                                  Controlled Substance Agreement  I understand that my care provider has prescribed controlled substances (narcotics, tranquilizers, and/or stimulants) to help manage my condition(s).  I am taking this medicine to help me function or work.  I know that this is strong medicine.  It could have serious side effects and even cause a dependency on the drug.  If I stop these medicines suddenly, I could have severe withdrawal symptoms.    The risks, benefits, and side effects of these medication(s) were explained to me.  I agree that:  1. I will take part in other treatments as advised by my provider.  This may be psychiatry or counseling, physical therapy, behavioral therapy, group treatment, or a referral to a pain clinic.  I will reduce or stop my medicine when my provider tells me to do so.   2. I will take my medicines as prescribed.  I will not change the dose or schedule unless my provider tells me to.  There will be no refills if I  run out early.   I may be contacted at any time without warning and asked to complete a drug test or pill count.   3. I will keep all my appointments at the clinic.  If I miss appointments or fail to follow instructions, my provider may stop my medicine.  4. I will not ask other providers to prescribe controlled substances. And I will not accept controlled substances from other people. If I need another prescribed controlled substance for a new reason, I will notify my provider within one business day.  5. If I enroll in the [place] Medical Marijuana program, I will tell my provider.  I will also sign an agreement to share my medical records with my provider.  6. I will use one pharmacy to fill all of my controlled substance prescriptions.  If my prescription is mailed to my pharmacy,  it may take 5 to 7 days for my medicine to be ready.  7. I understand that my provider, clinic care team, and pharmacy can track controlled substance prescriptions from other providers through a central database (prescription monitoring program).  8. I will bring in my list of medications (or my medicine bottles) each time I come to the clinic.  REV- 04/2016                                                                                                                                            Page 1 of 2      [facility]    04/11/18    Patient: Jailene Breen  YOB: 1965  Medical Record Number: [id number]    9. Refills of controlled substances will be made only during office hours.  It is up to me to make sure that I do not run out of my medicines on weekends or holidays.    10. I am responsible for my prescriptions.  If the medicine is lost or stolen, it will not be replaced.   I also agree not to share these medicines with anyone.  11. I agree to not use ANY illegal or recreational drugs.  This includes marijuana, cocaine, bath salts or other drugs.  I agree not to use alcohol unless my provider says I may.  I agree to give urine samples whenever asked.  If I fail to give a urine sample, the provider may stop my medicine.     12. I will tell my nurse or provider right away if I become pregnant or have a new medical problem treated outside of [facility]  13. I understand that this medicine can affect my thinking and judgment.  It may be unsafe for me to drive, use machinery and do dangerous tasks.  I will not do any of these things until I know how the medicine affects me.  If my dose changes, I will wait to see how it affects me.  I will contact my provider if I have concerns about medicine side effects.  I understand that if I do not follow any of the conditions above, my prescriptions or treatment may be stopped.    I agree that my provider, clinic care team, and  pharmacy may work with any city, state or federal law enforcement agency that investigates the misuse, sale, or other diversion of my controlled medicine. I will allow my provider to discuss my care with or share a copy of this agreement with any other treating provider, pharmacy or emergency room where I receive care.  I agree to give up (waive) any right of privacy or confidentiality with respect to these authorizations.   I have read this agreement and have asked questions about anything I did not understand.   ___________________________________    ___________________________  Patient Signature                                                           Date and Time  ___________________________________     ____________________________  Witness                                                                            Date and Time  ___________________________________  Susana Pike, JUANITA CNP  REV-  04/2016                                                                                                                                                                 Page 2 of 2  Opioid Pain Medicines (also known as Narcotics)  What You Need to Know      What are opioids?   Opioids are pain medicines that must be prescribed by a doctor. Examples are:     morphine (MS Contin, Raven)    oxycodone (Oxycontin)    oxycodone and acetaminophen (Percocet)    hydrocodone and acetaminophen (Vicodin, Norco)     fentanyl patch (Duragesic)     hydromorphone (Dilaudid)     methadone     What do opioids do well?   Opioids are best for short-term pain after a surgery or injury. They also work well for cancer pain. Unlike other pain medicines, they do not cause liver or kidney failure or ulcers. They may help some people with long-lasting (chronic) pain.     What do opioids NOT do well?   Opioids never get rid of pain entirely, and they do not work well for most patients with chronic pain. Opioids do not reduce swelling, one of  the causes of pain. They also don t work well for nerve pain.     Side effects  Talk to your doctor before you start or decide to keep taking one of these medicines. Side effects include:    Lowers your breathing rate enough that it could cause death    Death due to taking more than the prescribed dose    Serious lifelong opioid use      Dependence is not the same as addiction. Addiction is when people keep using a substance that harms their body, their mind or their relations with others. If you have a history of drug or alcohol abuse, taking opioids can cause a relapse.  Over time, opioids don t work as well. Most people will need higher and higher doses. The higher the dose, the more serious the side effects. We don t know the long-term effects of opioids.   People who have used opioids for a long time have a lower quality of life, worse depression, higher levels of pain and more visits to doctors.  Overdose from prescription drugs is the second leading cause of death in the [place] The risk of overdose rises when opioids are taken with other drugs such as:    Medicines used for anxiety and panic attacks (such as lorazepam, alprazolam, clonazepam    Other sedatives    Alcohol    Illegal drugs such as heroin  Never share your opioids with others. Be sure to store opioids in a secure place, locked if possible.Young children can easily swallow them and overdose.     Are there other ways to manage pain?  Ways to help reduce pain:    Exercise every day.    Treat health problems that may be causing pain.    Treat mental health problems like depression and anxiety.     Worse depression symptoms; Less pleasure in things you usually enjoy    Feeling tired or sluggish    Slower thoughts or cloudy thinking    Being more sensitive to pain over time; Pain is harder to control.    Trouble sleeping or restless sleep    Changes in hormone levels (for example, less testosterone).     Changes in sex drive or ability to have  sex    Long lasting nausea and constipation    Trouble breathing while asleep; This is worse with lung problems like COPD or sleep apnea.    Unsafe driving    Getting sick more often    Itching    Feeling dizzy    Dry mouth    Sweating    Trouble emptying the bladder (peeing). This is worse if you have an enlarged prostate or get urinary tract infections (UTIs).    What else should I know about opioids?  When someone takes opioids for too long or too often, they become dependent. This means that if you stop or reduce the medicine too quickly, you will have withdrawal symptoms.          Practice good sleep habits.  Try to go to bed and get up at the same time every day.    Stop smoking.  Tobacco use can make pain worse.    Do things that you enjoy.    Find a way to work through pain without drugs.  Try deep breathing, meditation, visual imagery and aromatherapy.    Ask your doctor to help you create a plan to manage your pain.

## 2018-04-11 NOTE — TELEPHONE ENCOUNTER
Pre-screening questions for MBB Injections:    Injection to be done at which interventional clinic site? [facility]    Procedure ordered by Dr. Susana Pike    Procedure ordered? Lumbar Medial Branch Block    What insurance would patient like us to bill for this procedure? [employer]      Worker's comp- Any injection DO NOT SCHEDULE and route to Janay López.      [employer] insurance - If scheduling an SI joint injection DO NOT SCHEDULE and route to Yasemin Chente.     [employer]- MBB's must be scheduled at LEAST two weeks apart      [employer] - Any injection besides hip/shoulder/knee joint DO NOT SCHEDULE and route to Yasemin Eldridge. She will obtain PA and call pt back to schedule procedure or notify pt of denial.     [employer]- PA required for all MBB's    Any chance of pregnancy? NO   If YES, do NOT schedule and route to RN pool    Is an  needed? No     Patient has a drive home? (mandatory) Yes     Is patient taking any blood thinners (plavix, coumadin, jantoven, warfarin, heparin, pradaxa or dabigatran )? No    If hold needed, do NOT schedule, route to RN pool     Is patient taking any aspirin products? No     If more than 325mg/day do NOT schedule; route to RN pool     For CERVICAL procedures, hold all aspirin products for 6 days.      Does the patient have a bleeding or clotting disorder? No    If YES, okay to schedule AND route to RN nurse pool    **For any patients with platelet count <100, must be forwarded to provider**    Is patient diabetic? NO  If YES, have them bring their glucometer.    Does patient have an active infection or treated for one within the past week? No    Is patient currently taking any antibiotics?  No    For patients on chronic, preventative, or prophylacti antibiotics, procedures can be scheduled.     For patients on antibiotics for active or recent infection:    Nikki Loya, Tye rG Burton, Snitzer-antibiotic course must have been  completed for 4 days     Dr. Ashford-antibiotic course must have been completed for 7 days    Is patient currently taking any steroid medications? (i.e. Prednisone, Medrol)  No     For patients on steroid medications:    Tye Kenney Burton, Snitzer-steroid course must have been completed for 4 days    Dr. Ashford-steroid course must have been completed for 7 days    Review with patient:  If you are started on any steroids or antibiotics between now and your appointment, you must contact us because it may affect our ability to perform your procedure INFORMED    Is patient actively being treated for cancer or immunocompromised? No   If YES, do NOT schedule and route to RN    Are you able to get on and off an exam table with minimal or no assistance? Yes   If NO, do NOT schedule and route to RN    Are you able to roll over and lay on your stomach with minimal or no assistance? Yes   If NO, do NOT schedule and route to RN    Any allergies to contrast dye, iodine, shellfish, or numbing and steroid medications? No  (If so, inform nursing and note in scheduling comments.)    Allergies: Hydrocodone and Remeron soltab     Has the patient had a flu shot or any other vaccinations within 7 days before or after the procedure.  No     Does patient have an MRI/CT?  YES: MRI  (SI joint, hip injections, lumbar sympathetic blocks, and stellate ganglion blocks do not require an MRI)    Was the MRI done w/in the last 3 years?  Yes    Was MRI done at [facility]? Yes      If not, where was it done? N/A       If MRI was not done at [facility], [facility] or [facility] do NOT schedule and route to nursing.  If pt has an imaging disc, the injection may be scheduled but pt has to bring disc to appt. If they show up w/out disc the injection cannot be done    **Must be scheduled with elapsed time interval of at least 2 weeks and not more than 6 months between the First MBB and the Second MBB**       Medial Branch Block Pre-Procedure  Instructions    It is okay to take long acting pain medications (if you are on them) the day of the procedure but try not to take any short acting medications unless absolutely necessary. INFORMED        Long acting meds would include: Gabapentin (Neurontin), MS Contin, Oxycontin        Short acting meds would include:  Percocet, Oxycodone, Vicodin, Ibuprofen     The day of the procedure, you should try to do things that provoke your pain, since the injection is being done to see if it will relieve your pain . INFORMED    If your pain level is a 4 out of 10 or less on the day of the procedure, please call [phone] to reschedule.  INFORMED  Reminders (please tell patient if applicable):      Instructed pt to arrive 30 minutes early for IV start if this is for a cervical procedure, ALL sympathetic (stellate ganglion, hypogastric, or lumbar sympathetic block) and all sedation procedures (RFA, spinal cord stimulation trials). N/A        -IVs are not routinely placed for Dr. Chen cervical cases        -Dr. Holley: no IV needed for CMBB       If NPO for sedation, it is okay to take medications with sips of water (except if they are to hold blood thinners). N/A   *DO take blood pressure medication if it is prescribed*      If this is for a cervical MBB aspirin needs to be held for 6 days.  N/A         Do not schedule procedures requiring IV placement in the first appointment of the day or first appointment after lunch. Do NOT schedule at 0745, 0815 or 1245.  reviewed and not discussed           For patients 85 or older we recommend having an adult stay w/ them for the remainder of the day.      Does the patient have any questions? NO    Elana Maldonado    [facility]

## 2018-04-11 NOTE — PROGRESS NOTES
[facility]    4/11/2018     Chief complaint: lower middle back pain. Left knee pain. Numbness in right great toe      Interval history:  Jailene Breen is a 52 year old male is known to me for   Lumbar spondylosis, pain is worse with extension and rotation indicating a facetogenic component to pain  Lumbar degenerative disc disease  SI joint pain  Ankylosing spondylitis  Myofascial pain  Chronic pain syndrome  PMHx includes: Panic attacks, back pain, arthritis, anxiety, ankylosing spondylitis  PSHx includes: Right knee surgery ×2, left foot surgery right knee arthroscopy        Recommendations/plan at the last visit on 1/31/2018 included:  1. Recommended to increase activity while pacing himself  2. Patient is to schedule a follow up appt to see Dr. Josias Levine of non-surgical ortho on 2/9/18 in follow up re: his ongoing left knee joint pain  3. Physical Therapy:  I agree with left knee physical therapy  4. Clinical Health Psychologist: The patient will schedule a follow up with Padilla Keene  to address issues of relaxation, behavioral change, coping style, and other factors important to improvement.    5. Diagnostic Studies:  none  6. Medication Management:    1. Stop topamax due to side effects   2. May us max of 2 tabs per day of the oxycodone but he cannot use 2 tablets every single day  7. Further procedures recommended: SUN, our office will contact patient to schedule   8. Advised patient to find a new PCP   9. Recommendations to PCP: see above  10. Follow up: 10-12 weeks      Since his last visit, Jailene Breen reports:  -low back pain remains, left knee pain remains. Having numbness and tingling in the right great toe  -his worst pain is in his low back. His back pain is about 95% of his pain and the right leg is about 5%.   -has been ill and could not take Humira for 4 weeks, now back on it for one week.   -worried about costs, has not done PHYSICAL THERAPY or had visco  "injection with Dr. Valladares as recommended by Dr. 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  Pain Group - not ordered  PT - has seen Cyndee White, none recently  Health Psych - seeing Padilla Keene, none since 9/2017      Pain scores:  Pain intensity on average is 8 on a scale of 0-10.    Range is 7-10/10.   Pain right now is 9/10.  Pain is described as \"numb, sharp, stabbing, tingling and pins/needles in right great toe.\"  Pain is constant in nature    Current pain relevant medications:   -nortriptyline 10-30mg at bedtime (10mg at bedtime, helpful)  -humira 40mg twice monthly(helpful)  -ibuprofen 800mg TID PRN (using 3 times daily-helpful)  -Tylenol 500mg using 1000mg TID (unsure if helpful)  -Maxalt 10mg PRN migraine (helpful for migraine--he does have visual aura)  -Oxycodone 5mg BID PRN (taking 1-2 tabs per day)     Other pertinent medications:  -clonazepam 1mg tab, may take 0.5-1mg BID PRN anxiety (helpful--last given #10 tabs on 3/26/2018)     Previous Medications:  OPIATES: Oxycodone (helpful), Fentanyl (100mcg/hr patch helpful), hydrocodone (itching), Tramadol (not helpful)   NSAIDS: ibuprofen (not helpful), Aleve (not helpful)  MUSCLE RELAXANTS: methocarbamol (somewhat helpful), Flexeril (somewhat helpful), tizanidine (unsure if helpful), metaxalone (unsure), SOMA (helpful)  ANTI-MIGRAINE MEDS: Maxalt (helpful for " migraine), Imitrex injection (somewhat helpful)  ANTI-DEPRESSANTS: Prozac (helpful), Cymbalta (felt weird on it), nortriptyline (unsure if helpful), Buspar (unsure), Remeron (blacked out), bupropion (not helpful for smoking cessation)  SLEEP AIDS: Ambien (helpful)  ANTI-CONVULSANTS: gabapentin (felt odd on this medication, unsure of dose), Lyrica (not helpful for 4 months, unsure of dose), Topamax (not helpful, he felt weird on it)   TOPICALS: Lidocaine patches (not helpful), Voltaren gel (not helpful)  Other meds: Tylenol (unsure if helpful)        Other treatments have included:  Jailene JOAQUÍN Jamshid has been seen at a pain clinic in the past.  [facility]  PT: tried, not helpful  Chiropractic: tried, not helpful  Acupuncture: none  TENs Unit: tried, helpful     Injections:   -has had injections at outside clinics  -has had epidural injections for low back pain (one was helpful)  -he has had lumbar medial branch blocks at [facility] and he was going to have lumbar radiofrequency ablation (did not do this due to cost)  -4/3/2017 right LMBBs with Dr. Ashlyn Gamble (helpful)  -4/26/2017 right LMBBs with Dr. Ashlyn Gamble (helpful)  -5/31/2017 right L3, L4, L5 RFA with Dr. Ashyln Gamble (good relief for over 6 months)  -3/15/2018 right L5 transforaminal MAKAYLA with Dr. Ashlyn Gamble (not helpful)          Side Effects: no side effect  Patient is using the medication as prescribed: YES    Medications:  Current Outpatient Prescriptions   Medication Sig Dispense Refill     oxyCODONE IR (ROXICODONE) 5 MG tablet Take 1 tablet every 6-8 hours as needed for pain, max of 2 tabs per day but cannot take 2 tabs every day.  Okay to fill on 3/20/2018 and start on 3/21/2018. To last 30 days. No further early refills 45 tablet 0     predniSONE (DELTASONE) 20 MG tablet Take 1 tablet (20 mg) by mouth daily 5 tablet 0     nortriptyline (PAMELOR) 50 MG capsule Take 1 capsule (50 mg) by mouth At Bedtime 90 capsule 1     HUMIRA PEN  40 MG/0.8ML pen kit        methotrexate 2.5 MG tablet 6 tablets Reported on 4/3/2017       IBUPROFEN PO Take 800 mg by mouth every 6 hours as needed for moderate pain Reported on 5/19/2017       clonazePAM (KLONOPIN) 1 MG tablet Take 0.5-1 tablets (0.5-1 mg) by mouth 2 times daily as needed for anxiety (Patient not taking: Reported on 4/11/2018) 10 tablet 0     guaiFENesin-codeine (ROBITUSSIN AC) 100-10 MG/5ML SOLN solution Take 5 mLs by mouth every 4 hours as needed for cough (Patient not taking: Reported on 3/15/2018) 120 mL 0     Celecoxib (CELEBREX PO)        buPROPion (WELLBUTRIN SR) 150 MG 12 hr tablet Take 1 tablet (150 mg) by mouth 2 times daily (Patient not taking: Reported on 3/15/2018) 180 tablet 1     ketoconazole (NIZORAL) 2 % shampoo Apply to the affected area and wash off after 5 minutes.use until skin condition is better (Patient not taking: Reported on 3/15/2018) 120 mL 1     folic acid (FOLVITE) 1 MG tablet Reported on 5/19/2017         Medical History: any changes in medical history since they were last seen? No    Social History:   Home situation: , has 2 children in college  Occupation/Schooling: currently unemployed, worked as a  (unemployed since 3/1/2017)  Tobacco use: none  Alcohol use: none  Drug use: none  History of chemical dependency treatment: none    Review of Systems:  ROS is positive as per HPI as well as for ringing in the ears, cough, joint pain, arthritis, numbness and tingling, depression and is negative for fevers, chills, sweats, or constipation, diarrhea.      Physical Exam:  Vital signs: /86  Pulse 75  Wt 119.3 kg (263 lb)  BMI 32.01 kg/m2     Behavioral observations:  Awake, alert, cooperative.     Gait:  normal    Musculoskeletal exam:    Moves well in the exam room  Strength grossly equal throughout  Lumbar flexion 60 degrees  Lumbar extension 20 degrees  Lumbar ext/rotation to right is mildly painful  Lumbar ext/rotation to the left is painful   SI  joint tenderness on the left side    Neuro exam:  SILT in all extremities     Skin/vascular/autonomic:  No suspicious lesions on exposed skin.      Other:  NA      [place] Prescription Monitoring Program:  Reveiwed    DIRE Score for selecting candidates for long term opioid analgesia for chronic pain:  Diagnosis  2  Intractablility  2  Risk    Psychological health  2    Chemical health  2    Reliability  2    Social support  2  Efficacy  2    Total DIRE Score = 14. Note that  7-13 predicts poor outcome (compliance and efficacy) from opioid prescribing; 14-21 predicts good outcome (compliance and efficacy)  from opioid prescribing.      Assessment:   1. Lumbar spondylosis, pain is worse with extension and rotation indicating a facetogenic component to pain  2. Lumbar facet joint pain  3. SI joint dysfunction on the left  4. Chronic left knee joint pain  5. Chronic low back pain with right sided radicular symptoms  6. Chronic pain syndrome  7. PMHx includes: Panic attacks, back pain, arthritis, anxiety, ankylosing spondylitis  8. PSHx includes: Right knee surgery ×2, left foot surgery right knee arthroscopy      Plan:   1. Recommended to increase activity while pacing himself  2. Physical Therapy:  I agree with left knee physical therapy  3. Clinical Health Psychologist: The patient will schedule a follow up with Padilla Keene  to address issues of relaxation, behavioral change, coping style, and other factors important to improvement.    4. Diagnostic Studies:  none  5. Medication Management:    1. Continue oxycodone  6. Further procedures recommended: schedule left lumbar medial branch blocks and proceed to lumbar RFA as indicated. Our office to contact pt  7. Recommendations to PCP: see above  8. UDS today, last took oxycodone 2 days ago, does not use this medication every day  9. Re-sign opiate agreement  10. Follow up: 12 weeks    Total time spent face to face was 30 minutes and more than 50% of face to  face time was spent in counseling and/or coordination of care regarding the diagnosis and recommendations above.        Susana GRANT RN CNP, FNP  [facility]

## 2018-04-13 ENCOUNTER — MYC MEDICAL ADVICE (OUTPATIENT)
Dept: PALLIATIVE MEDICINE | Facility: CLINIC | Age: 53
End: 2018-04-13

## 2018-04-13 DIAGNOSIS — M79.18 MYOFASCIAL PAIN: ICD-10-CM

## 2018-04-13 DIAGNOSIS — M62.830 BACK MUSCLE SPASM: Primary | ICD-10-CM

## 2018-04-13 RX ORDER — CYCLOBENZAPRINE HCL 10 MG
5-10 TABLET ORAL 3 TIMES DAILY PRN
Qty: 30 TABLET | Refills: 1 | Status: SHIPPED | OUTPATIENT
Start: 2018-04-13 | End: 2018-08-14

## 2018-04-13 NOTE — TELEPHONE ENCOUNTER
Hi Les-    I just sent a small script for Flexeril (cyclobenzaprine) to the pharmacy at [facility] where you usually fill your medication.    Be safe this weekend if you do any driving, and be careful as Flexeril can make you feel more tired.    Thanks.  Susana GRANT, RN WILBER, JAQUELINP  [facility]    I have sent the above myChart message to the patient.     Susana GRANT RN CNP, JAQUELINP  [facility] [place]

## 2018-04-13 NOTE — TELEPHONE ENCOUNTER
My chart message from pt:    Lopez Pike, just wanted you to know that ever since we did that bending test on my right side, I've been in extreme pain and can't sit, stand or even sleep without the pain shooting on my lower right side since then and what I am taking is not even coming close to taking care of the pain.  I'm also wondering about the block that was ordered for my left facet joint, most of my pain is on the right side although you did find that sore spot on my left si joint.  Just wanted to bring that up in case wrong side was mixed up. Thanks for your time in reading this, Les.     Sending to provider.     Sondra Hearn RN, [facility]  Pain Clinic Care Coordinator

## 2018-04-13 NOTE — TELEPHONE ENCOUNTER
My chart message from pt:    I guess I could try some Flexeril. I was thinking it would eventually calm back down to what it's normally just was shazia surprised on how much that aggravated it.     Sending to provider.     Sondra Hearn RN, [facility]  Pain Clinic Care Coordinator

## 2018-04-13 NOTE — TELEPHONE ENCOUNTER
Devante Swift-  Thanks for the update. I realize that you are having pain on the right side with the facet joints, but since you have had right sided RFA done, we don't need to redo the lumbar medial branch blocks.   You can try ice, heat, relaxation, warm bath with Epsom salts, massage, acupressure or acupuncture for management of this pain flare.  I think it will calm down soon, though the weather system that is coming in has also made pain worse for some people as well.   If you are having muscle spasms or tightness, I could send in a script for a muscle relaxant. Review of your chart shows that you have had some benefit in the past with Flexeril or Robaxin (other names for these are cyclobenzaprine and methocarbamol, respectively).     Thanks  Susana GRANT RN CNP, XUAN  [facility]    I have sent the above [employer] message to the patient.     Susana GRANT RN CNP, XUAN  [facility]

## 2018-04-15 LAB — PAIN DRUG SCR UR W RPTD MEDS: NORMAL

## 2018-05-09 ENCOUNTER — TRANSFERRED RECORDS (OUTPATIENT)
Dept: HEALTH INFORMATION MANAGEMENT | Facility: CLINIC | Age: 53
End: 2018-05-09

## 2018-05-10 ENCOUNTER — MYC MEDICAL ADVICE (OUTPATIENT)
Dept: PALLIATIVE MEDICINE | Facility: CLINIC | Age: 53
End: 2018-05-10

## 2018-05-10 ENCOUNTER — RADIOLOGY INJECTION OFFICE VISIT (OUTPATIENT)
Dept: PALLIATIVE MEDICINE | Facility: CLINIC | Age: 53
End: 2018-05-10
Attending: NURSE PRACTITIONER
Payer: COMMERCIAL

## 2018-05-10 ENCOUNTER — RADIANT APPOINTMENT (OUTPATIENT)
Dept: RADIOLOGY | Facility: CLINIC | Age: 53
End: 2018-05-10
Attending: PSYCHIATRY & NEUROLOGY
Payer: COMMERCIAL

## 2018-05-10 VITALS — SYSTOLIC BLOOD PRESSURE: 139 MMHG | DIASTOLIC BLOOD PRESSURE: 89 MMHG | HEART RATE: 67 BPM

## 2018-05-10 DIAGNOSIS — M79.18 MYOFASCIAL PAIN: Primary | ICD-10-CM

## 2018-05-10 DIAGNOSIS — M79.18 MYOFACIAL MUSCLE PAIN: ICD-10-CM

## 2018-05-10 DIAGNOSIS — M47.819 FACET ARTHROPATHY: Primary | ICD-10-CM

## 2018-05-10 DIAGNOSIS — M47.819 FACET ARTHROPATHY: ICD-10-CM

## 2018-05-10 DIAGNOSIS — M47.816 SPONDYLOSIS OF LUMBAR REGION WITHOUT MYELOPATHY OR RADICULOPATHY: ICD-10-CM

## 2018-05-10 PROCEDURE — 20553 NJX 1/MLT TRIGGER POINTS 3/>: CPT | Performed by: PSYCHIATRY & NEUROLOGY

## 2018-05-10 ASSESSMENT — PAIN SCALES - GENERAL: PAINLEVEL: WORST PAIN (10)

## 2018-05-10 NOTE — MR AVS SNAPSHOT
After Visit Summary   5/10/2018    Jailene Breen    MRN: [id number]           Patient Information     Date Of Birth          1965        Visit Information        Provider Department      5/10/2018 7:45 AM Silvia Gamble MD [facility] [place]        Care Instructions    [facility]   Post Procedure Instructions    Today you had:  trigger point injections     Medications used:  lidocaine   bupivicaine          Go to the emergency room if you develop any shortness of breath    Monitor the injection sites for signs and symptoms of infection-fever, chills, redness, swelling, warmth, or drainage to areas.    You may have soreness at injection sites for up to 24 hours.    You may apply ice to the painful areas to help minimize the discomfort of the needle pokes.    Do not apply heat to sites for at least 12 hours.    You may use anti-inflammatory medications or Tylenol for pain control if necessary    Pain Clinic phone number during work hours Monday-Friday:  [phone]    After hours provider line: [phone]                Follow-ups after your visit        Who to contact     If you have questions or need follow up information about today's clinic visit or your schedule please contact [facility] [place] directly at [phone].  Normal or non-critical lab and imaging results will be communicated to you by MyChart, letter or phone within 4 business days after the clinic has received the results. If you do not hear from us within 7 days, please contact the clinic through MyChart or phone. If you have a critical or abnormal lab result, we will notify you by phone as soon as possible.  Submit refill requests through [employer] or call your pharmacy and they will forward the refill request to us. Please allow 3 business days for your refill to be completed.          Additional Information About Your Visit        [employer] Information     [employer] gives you secure  access to your electronic health record. If you see a primary care provider, you can also send messages to your care team and make appointments. If you have questions, please call your primary care clinic.  If you do not have a primary care provider, please call [phone] and they will assist you.        Care EveryWhere ID     This is your Care EveryWhere ID. This could be used by other organizations to access your [facility] medical records  [id number]        Your Vitals Were     Pulse                   67            Blood Pressure from Last 3 Encounters:   05/10/18 139/89   04/11/18 138/86   03/15/18 139/89    Weight from Last 3 Encounters:   04/11/18 119.3 kg (263 lb)   02/20/18 115.7 kg (255 lb)   02/20/18 115.7 kg (255 lb)              Today, you had the following     No orders found for display       Primary Care Provider Office Phone # Fax #    [place] JASON Meyer [phone] [id number] [facility] [address]        Equal Access to Services     [facility] : Hadii aad ku hadasho Soomaali, waaxda luqadaha, qaybta kaalmada adeegyada, waxay idiin haytariqn lilia davis . So [facility] [phone].    ATENCIÓN: Si habla español, tiene a carrington disposición servicios gratuitos de asistencia lingüística. [facility] [phone].    We comply with applicable federal civil rights laws and [place] laws. We do not discriminate on the basis of race, color, national origin, age, disability, sex, sexual orientation, or gender identity.            Thank you!     Thank you for choosing [facility]  for your care. Our goal is always to provide you with excellent care. Hearing back from our patients is one way we can continue to improve our services. Please take a few minutes to complete the written survey that you may receive in the mail after your visit with us. Thank you!             Your Updated Medication List - Protect others around you: Learn how to safely use, store and  throw away your medicines at www.disposemymeds.org.          This list is accurate as of 5/10/18  8:39 AM.  Always use your most recent med list.                   Brand Name Dispense Instructions for use Diagnosis    buPROPion 150 MG 12 hr tablet    WELLBUTRIN SR    180 tablet    Take 1 tablet (150 mg) by mouth 2 times daily    Anxiety, Tobacco abuse       CELEBREX PO           clonazePAM 1 MG tablet    klonoPIN    10 tablet    Take 0.5-1 tablets (0.5-1 mg) by mouth 2 times daily as needed for anxiety    JEFF (generalized anxiety disorder)       cyclobenzaprine 10 MG tablet    FLEXERIL    30 tablet    Take 0.5-1 tablets (5-10 mg) by mouth 3 times daily as needed for muscle spasms Caution sedation    Back muscle spasm, Myofascial pain       folic acid 1 MG tablet    FOLVITE     Reported on 5/19/2017        guaiFENesin-codeine 100-10 MG/5ML Soln solution    ROBITUSSIN AC    120 mL    Take 5 mLs by mouth every 4 hours as needed for cough    Cough       HUMIRA PEN 40 MG/0.8ML pen kit   Generic drug:  adalimumab           IBUPROFEN PO      Take 800 mg by mouth every 6 hours as needed for moderate pain Reported on 5/19/2017        ketoconazole 2 % shampoo    NIZORAL    120 mL    Apply to the affected area and wash off after 5 minutes.use until skin condition is better    Seborrheic dermatitis       methotrexate 2.5 MG tablet CHEMO      6 tablets Reported on 4/3/2017        nortriptyline 50 MG capsule    PAMELOR    90 capsule    Take 1 capsule (50 mg) by mouth At Bedtime    Anxiety, Other chronic pain       oxyCODONE IR 5 MG tablet    ROXICODONE    45 tablet    Take 1 tablet every 6-8 hours as needed for pain, max of 2 tabs per day but cannot take 2 tabs every day.  Okay to fill on 4/18/2018 and start on 4/20/2018. To last 30 days. No further early refills    Chronic pain of left knee, Chronic bilateral low back pain with right-sided sciatica       predniSONE 20 MG tablet    DELTASONE    5 tablet    Take 1 tablet (20 mg)  by mouth daily    Acute bronchitis, unspecified organism, Acute recurrent maxillary sinusitis

## 2018-05-10 NOTE — Clinical Note
He had radiofrequency ablation previously in that location, so was hoping for repeat radiofrequency ablation today.  Can't do today without planning, so did deep trigger point injections today, and put in paperwork to get repeat done.  Ashlyn Gamble MD [facility]

## 2018-05-10 NOTE — NURSING NOTE
Discharge Information    IV Discontiued Time:  NA    Amount of Fluid Infused:  NA    Discharge Criteria = When patient returns to baseline or as per MD order    Consciousness:  Pt is fully awake    Circulation:  BP +/- 20% of pre-procedure level    Respiration:  Patient is able to breathe deeply    O2 Sat:  Patient is able to maintain O2 Sat >92% on room air    Activity:  Moves 4 extremities on command    Ambulation:  Patient is able to stand and walk or stand and pivot into wheelchair    Dressing:  Clean/dry or No Dressing    Notes:   Discharge instructions and AVS given to patient    Patient meets criteria for discharge?  YES    Admitted to PCU?  No    Responsible adult present to accompany patient home?  Yes    Signature/Title:    Renetta Adam RN Care Coordinator  [facility]

## 2018-05-10 NOTE — PROGRESS NOTES
Pre procedure Diagnosis: facet arthropathy, myofascial pain  Post procedure Diagnosis: Same  Procedure performed: trigger point injections  Anesthesia: none  Complications: none  Operators: Ashlyn Gamble MD and Julisa Sotelo DO     Indications:   Jailene Breen is a 52 year old male was sent by Susana Pike NP for medial branch block.  They have a history of low back pain.  Previous radiofrequency ablation done 5/31/17 on the right gave him < 6 months of relief, but he had the best pain relief for 2 months.   Exam shows pain with extension/rotation to the right, but he has tenderness to palpation over the left L5-S1 facet and they have tried conservative treatment including medications, PT.    He thought he was here for radiofrequency ablation today, but it hasn't been approved.  Had bad flare of pain after last extension/rotation and having significant pain into the buttock today after doing extension/rotation.  Plan for radiofrequency ablation in the future (60min ok), but today will do deep trigger points close to the joints to help with pain    Options/alternatives, benefits and risks were discussed with the patient including bleeding, infection, tissue trauma, exposure to radiation, reaction to medications, spinal cord injury, weakness, numbness and paralysis.  Questions were answered to his satisfaction and he agrees to proceed. Voluntary informed consent was obtained and signed.     Vitals were reviewed: Yes  Allergies were reviewed:  Yes   Medications were reviewed:  Yes   Pre-procedure pain score: 10/10    Procedure:  After getting informed consent, patient was brought into the procedure suite and was placed in a prone position on the procedure table.   A Pause for the Cause was performed.  Patient was prepped and draped in sterile fashion.     Trigger points were identified by patient, and marked when appropriate.  The area was prepped with Chloroprep.    Using clean technique, injections were  completed using a 25G, 3.5 inch needle.  After negative aspiration, injection was completed.  A total of 3 locations were injected.  When possible, tissue was retracted from the chest wall to avoid lung injury.    Muscle groups injected:  Bilateral lumbar paraspinal   Bilateral multifidus  Bilateral quadratus lumborum     Injection solution contained:  8ml of 1% lidocaine and 3ml of 0.5% bupivacaine.    Post-procedure pain score: significantly better  Follow-up includes:   -will put in paperwork for repeat radiofrequency ablation     Ashlyn Gamble MD  [facility]

## 2018-05-10 NOTE — NURSING NOTE
Pre-procedure Intake    Have you been fasting? NA    If yes, for how long? NA    Are you taking a prescribed blood thinner such as coumadin, Plavix, Xarelto?    No    If yes, when did you take your last dose? NA    Do you take aspirin?  No    If cervical procedure, have you held aspirin for 6 days?   NA    Do you have any allergies to contrast dye, iodine, steroid and/or numbing medications?  NO    Are you currently taking antibiotics or have an active infection?  NO    Have you had a fever/elevated temperature within the past week? NO    Are you currently taking oral steroids? NO    Do you have a ? Yes       Are you pregnant or breastfeeding?  Not Applicable    Are the vital signs normal?  Yes    Monica Palma CMA ([facility])

## 2018-05-10 NOTE — TELEPHONE ENCOUNTER
Pt requesting a repeat radiofrequency ablation.       Need to check insurance coverage for repeat  Right L3,L4,L5 radiofrequency ablation Dx Facet arthropathy 721.9. For medicare patients use 716.98     Last radiofrequency ablation was 5/31/17.     Pt had 70 % relief for 6 months from last RFA.     Insurance:  TBD       Called pt and informed him/her that insurance would be checked and will be called once we get a response.  Done    Routed to Yasemin.    Cha Molina, RN-BSN  [facility]

## 2018-05-10 NOTE — PATIENT INSTRUCTIONS
[facility]   Post Procedure Instructions    Today you had:  trigger point injections     Medications used:  lidocaine   bupivicaine          Go to the emergency room if you develop any shortness of breath    Monitor the injection sites for signs and symptoms of infection-fever, chills, redness, swelling, warmth, or drainage to areas.    You may have soreness at injection sites for up to 24 hours.    You may apply ice to the painful areas to help minimize the discomfort of the needle pokes.    Do not apply heat to sites for at least 12 hours.    You may use anti-inflammatory medications or Tylenol for pain control if necessary    Pain Clinic phone number during work hours Monday-Friday:  [phone]    After hours provider line: [phone]

## 2018-05-11 NOTE — TELEPHONE ENCOUNTER
MEDICA RFA REQUIREMENTS- NO PA REQUIRED    [employer]  o 6 months failed conservative treatment (meds, injections, etc.);   o 4 weeks of PT within the last 6 months (or an unfavorable evaluation);   o at least 1 successful workup resulting in significant pain relief    REPEAT RFA REQUIREMENTS FOR ALL  Must wait 6 months and experience >50% pain relief following previous RFA.

## 2018-05-14 ENCOUNTER — MYC MEDICAL ADVICE (OUTPATIENT)
Dept: PALLIATIVE MEDICINE | Facility: CLINIC | Age: 53
End: 2018-05-14

## 2018-05-14 DIAGNOSIS — G89.29 CHRONIC PAIN OF LEFT KNEE: ICD-10-CM

## 2018-05-14 DIAGNOSIS — M54.41 CHRONIC BILATERAL LOW BACK PAIN WITH RIGHT-SIDED SCIATICA: ICD-10-CM

## 2018-05-14 DIAGNOSIS — M25.562 CHRONIC PAIN OF LEFT KNEE: ICD-10-CM

## 2018-05-14 DIAGNOSIS — G89.29 CHRONIC BILATERAL LOW BACK PAIN WITH RIGHT-SIDED SCIATICA: ICD-10-CM

## 2018-05-14 NOTE — TELEPHONE ENCOUNTER
Patient requesting refill(s) of oxyCODONE IR (ROXICODONE) 5 MG tablet    Last picked up from pharmacy on 04/18/18     Pt last seen by prescribing provider on 4/11/18  Next appt scheduled for: No future appointment.      checked in the past 6 months? Yes If no, print current report and give to RN    Last urine drug screen date 4/11/18  Current opioid agreement on file (completed within the last year) Yes Date of opioid agreement: 4/11/18    Processing (pick one)     Pt will  in clinic at [place] location    Will route to nursing pool for review and preparation of prescription(s).

## 2018-05-14 NOTE — TELEPHONE ENCOUNTER
Medication refill information reviewed.     Due date for  oxyCODONE IR (ROXICODONE) 5 MG tablet    is 5/20/18     Prescriptions prepped for review.     Will route to provider.

## 2018-05-14 NOTE — TELEPHONE ENCOUNTER
Tariq sent to pt:  From: Cha Molina RN        Created: 5/14/2018 8:14 AM       Hi Les.  What medication do you need refilled?  Which pharmacy do you use?  Do you want to  script or have it mailed?    Cha Molina RN-BSN  [facility]

## 2018-05-14 NOTE — TELEPHONE ENCOUNTER
GARY for patient to schedule Repeat Lumbar RFA.      Elana Maldonado    [facility]

## 2018-05-14 NOTE — TELEPHONE ENCOUNTER
Per patient myChart message:  From: Jailene Breen      Created: 5/14/2018 6:54 AM      Good morning, could I please get a refill on my pain medication, thanks Fiona

## 2018-05-15 RX ORDER — OXYCODONE HYDROCHLORIDE 5 MG/1
TABLET ORAL
Qty: 45 TABLET | Refills: 0 | Status: SHIPPED | OUTPATIENT
Start: 2018-05-15 | End: 2018-06-15

## 2018-05-15 NOTE — TELEPHONE ENCOUNTER
Script for oxycodone was signed and kept in file folder for patient to pick at [facility] 2nd floor. Reminder pink slip placed in with script. Patient was notified.       Nicolasa Krueger MA

## 2018-05-15 NOTE — TELEPHONE ENCOUNTER
Signed Prescriptions:                        Disp   Refills    oxyCODONE IR (ROXICODONE) 5 MG tablet      45 tab*0        Sig: Take 1 tablet every 6-8 hours as needed for pain, max           of 2 tabs per day but cannot take 2 tabs every           day.  Okay to fill on 5/18/2018 and start on           5/20/2018. To last 30 days. No further early           refills  Authorizing Provider: SUSANA PETTY    Signed script placed in touchdown bin at [facility].    Susana Petty APRN CNP FNP RN  [facility]

## 2018-05-21 ENCOUNTER — MYC MEDICAL ADVICE (OUTPATIENT)
Dept: FAMILY MEDICINE | Facility: CLINIC | Age: 53
End: 2018-05-21

## 2018-05-21 NOTE — TELEPHONE ENCOUNTER
Request to submit an E-visit message sent to patient regarding his increased anxiety and request for medication refill. Last anxiety follow up OV was 7/28/17. Previous Kaait message sent on 3/26/18 regarding anxiety and small amount of medication was given. Patient needs follow up and monitoring of medications.    Geno Guaman RN  [facility]

## 2018-05-23 ENCOUNTER — MYC MEDICAL ADVICE (OUTPATIENT)
Dept: FAMILY MEDICINE | Facility: CLINIC | Age: 53
End: 2018-05-23

## 2018-05-23 DIAGNOSIS — F41.1 GAD (GENERALIZED ANXIETY DISORDER): ICD-10-CM

## 2018-05-24 RX ORDER — CLONAZEPAM 1 MG/1
0.5-1 TABLET ORAL 2 TIMES DAILY PRN
Qty: 10 TABLET | Refills: 0 | Status: SHIPPED | OUTPATIENT
Start: 2018-05-24 | End: 2018-07-13

## 2018-05-29 NOTE — TELEPHONE ENCOUNTER
Please call pt again to schedule.    Cha Molina RN-BSN  [facility]

## 2018-05-31 NOTE — TELEPHONE ENCOUNTER
LM for patient to schedule repeat Lumbar RFA      Elana Maldonado    [facility]

## 2018-06-05 NOTE — TELEPHONE ENCOUNTER
Pre-screening Questions for RFA Procedure      Procedure ordered? Lumbar RFA    What insurance are we billing for this procedure?  Medica    Has patient had this injection before? Yes:   Any chance of pregnancy? NO   If YES, do NOT schedule and route to RN pool    Is  Needed?: No  Will patient have a ?  Yes   If pt is given sedation meds, no driving for 24 hours.  Is pt taking a cab or transportation service? NO        If so will need to be accompanied by an adult too (friend/family member) in order for IV sedation to be given.      Per [place] Policy:  Outpatients are to have responsible adult or family member to accompany them at discharge and drive them home. A service providing medically trained drivers or attendants would be acceptable. Public transportation would not be acceptable unless the patient is accompanied by a responsible adult or family member.    Is patient taking any blood thinners (plavix, coumadin, jantoven, warfarin, heparin, pradaxa or dabigatran )? No   If YES, do NOT schedule, and route to RN pool    Is patient taking any aspirin products? No     If more than 325mg/day do NOT schedule and route to RN pool     For CERVICAL procedures, hold all aspirin products for 6 days.     Does the patient have a bleeding or clotting disorder? No     If YES, it it OKAY to schedule AND route to RN pool    **For any patients with platelet count <100, must be forwarded to provider**    Does patient have an active infection or treated for one within the past week? No   If YES, do NOT schedule and route to RN nurse pool     Is patient currently taking any antibiotics?  No    For patients on chronic, preventative, or prophylactic antibiotics, procedures may be scheduled.     For patients on antibiotics for active or recent infection:    Tye Kenney Burton, Snitzer-antibiotic course must have been completed for 4 days    Is patient currently taking any steroid medications? (i.e.  Prednisone, Medrol)  No     For patients on steroid medications:    Nikki Loya, Tye, Kishan Chen-steroid course must have been completed for 4 days    Reviewed with patient:  If you are started on any steroids or antibiotics between now and your appointment, you must contact us because it may affect our ability to perform your procedure.  Yes    Is patient actively being treated for cancer or immunocompromised, including the spleen having been removed? No  If YES, do NOT schedule and route to RN pool     Any history of complications with sedation medications?  NO   If YES, OK to schedule AND route to RN pool     Any history of sleep apnea?  NO   If YES, OK to schedule AND route to RN pool     Any cardiac history?  NO   If YES, OK to schedule AND route to RN pool     Do you have an implanted pacemaker, ICD (implanted cardiac device) or AICD (automatic implanted cardiac device)?  NO    If YES, do NOT schedule AND route to RN pool.     Obtain name of device : N/A      Obtain name of cardiologist: N/A      Do you have an implanted stimulator?  NO    If YES, OK to schedule AND route to nursing.     Instruct patient to bring in the remote to the appointment and it will need to be turned off.  reviewed and not discussed      Does patient have an allergy to contrast dye, iodine or shellfish?  No   If YES, OK to schedule. Route to RN pool AND add allergy information to appointment notes    Are you able to get on and off an exam table with minimal or no assistance? Yes   If NO, do NOT schedule and route to RN pool    Are you able to roll over and lay on your stomach with minimal or no assistance? Yes   If NO, do NOT schedule and route to RN pool    Informed patient that s/he needs to be NPO for 6 hours before procedure?  YES   Informed patient that it is OK to take normal medications with sips of water, especially blood pressure medications, before the procedure and must hold blood thinners as  instructed.  Yes  Informed patient to arrive 30 minutes before procedure time to have an IV inserted.  reviewed   Do NOT schedule at 0745, 0815 or 1245.  reviewed   All radiofrequency ablations are in a 90 minute time slot except genicular radiofrequency ablation those are 60 minute time slot.  reviewed     In [place] there are only 6 probes for each area (lumbar and cervical).        Make sure that there is at least 48 hours between Lumbar RFA s N/A     Make sure there is at least 48 hours between procedures needing cervical probes (Cervical RFAs, Genicular RFAs, and TON RFA s). N/A    Elana Maldonado    [facility]

## 2018-06-08 ENCOUNTER — MYC MEDICAL ADVICE (OUTPATIENT)
Dept: PALLIATIVE MEDICINE | Facility: CLINIC | Age: 53
End: 2018-06-08

## 2018-06-08 NOTE — TELEPHONE ENCOUNTER
My chart sent from patient:                    Hello, somehow there was a problem with the schedule. Everytime I have an appointment with you I always set up the next one before I leave, but somehow there wasn't one. So the next I got wasn't until August. If you can get me in sooner I would appreciate it, thank you.         Crissy CHURCHILLN-RN Care Coordinator  [facility] [place]

## 2018-06-11 NOTE — TELEPHONE ENCOUNTER
Reviewed chart. Pt was last seen on 4/11 with recommendations to be seen in 12 weeks (mid-July). Pt is scheduled for mid-August.  Message sent to pt:    Lopez Swift,     I have added a note to your appointment which puts you on a waiting list to get in sooner if something becomes available. I will let Susana Pike CNP know about what happened with the timing of your appointment.  Unless you are having additional concerns that cannot wait until mid-August, we will plan to see you then.     Take care,     LETHA Parker, RN-BC  Patient Care Supervisor/Care Coordinator  [facility]

## 2018-06-18 DIAGNOSIS — F17.200 TOBACCO USE DISORDER: ICD-10-CM

## 2018-06-18 NOTE — TELEPHONE ENCOUNTER
Nicotine Polacrilex 2mg gum      Last Written Prescription Date:  na  Last Fill Quantity: na,   # refills: na  Last Office Visit: 02/20/18  Future Office visit:    Next 5 appointments (look out 90 days)     Aug 14, 2018  8:00 AM CDT   Return Visit with JUANITA Ga CNP   [facility] [place] ([facility])    [id number]  [place] [address]   [phone]                   Routing refill request to provider for review/approval because:  Drug not active on patient's medication list

## 2018-07-09 ENCOUNTER — MYC MEDICAL ADVICE (OUTPATIENT)
Dept: PALLIATIVE MEDICINE | Facility: CLINIC | Age: 53
End: 2018-07-09

## 2018-07-09 DIAGNOSIS — G89.29 CHRONIC PAIN OF LEFT KNEE: ICD-10-CM

## 2018-07-09 DIAGNOSIS — M25.562 CHRONIC PAIN OF LEFT KNEE: ICD-10-CM

## 2018-07-09 DIAGNOSIS — M54.41 CHRONIC BILATERAL LOW BACK PAIN WITH RIGHT-SIDED SCIATICA: ICD-10-CM

## 2018-07-09 DIAGNOSIS — G89.29 CHRONIC BILATERAL LOW BACK PAIN WITH RIGHT-SIDED SCIATICA: ICD-10-CM

## 2018-07-09 NOTE — TELEPHONE ENCOUNTER
Received call from patient requesting refill(s) of oxyCODONE IR (ROXICODONE) 5 MG tablet    Last picked up from pharmacy on 06/17/18    Pt last seen by prescribing provider on 04/11/18 - has had injections since  Next appt scheduled for 07/12/18 for injection, 08/14/18 with Pre scriber for office visit     checked in the past 6 months? Yes If no, print current report and give to RN    Last urine drug screen date 04/11/18  Current opioid agreement on file (completed within the last year) Yes Date of opioid agreement: 04/11/18    Processing (pick one and delete the others):        Pt will  in clinic at [place] location  - Patient is requesting  of hard copy of Rx on 07/12/18, when he is here for injection       Will route to nursing pool for review and preparation of prescription(s).

## 2018-07-10 RX ORDER — OXYCODONE HYDROCHLORIDE 5 MG/1
TABLET ORAL
Qty: 45 TABLET | Refills: 0 | Status: SHIPPED | OUTPATIENT
Start: 2018-07-10 | End: 2018-08-07

## 2018-07-10 NOTE — TELEPHONE ENCOUNTER
Signed Prescriptions:                        Disp   Refills    oxyCODONE IR (ROXICODONE) 5 MG tablet      45 tab*0        Sig: Take 1 tablet every 6-8 hours as needed for pain, max           of 2 tabs per day but cannot take 2 tabs every           day.  Okay to fill on 7/17/2018 and start on           7/192018. To last 30 days. No further early           refills  Authorizing Provider: SUSANA PETTY    Signed script placed in touchdown bin at [facility].    Susana Petty APRN CNP FNP RN  [facility]

## 2018-07-10 NOTE — TELEPHONE ENCOUNTER
Medication refill information reviewed.     Due date for oxyCODONE IR (ROXICODONE) 5 MG tablet is 7/19/18     Prescriptions prepped for review.     Will route to provider.       Josias Lopez RN

## 2018-07-12 ENCOUNTER — RADIANT APPOINTMENT (OUTPATIENT)
Dept: RADIOLOGY | Facility: CLINIC | Age: 53
End: 2018-07-12
Attending: PSYCHIATRY & NEUROLOGY
Payer: COMMERCIAL

## 2018-07-12 ENCOUNTER — RADIOLOGY INJECTION OFFICE VISIT (OUTPATIENT)
Dept: PALLIATIVE MEDICINE | Facility: CLINIC | Age: 53
End: 2018-07-12
Payer: COMMERCIAL

## 2018-07-12 ENCOUNTER — MYC MEDICAL ADVICE (OUTPATIENT)
Dept: FAMILY MEDICINE | Facility: CLINIC | Age: 53
End: 2018-07-12

## 2018-07-12 VITALS
SYSTOLIC BLOOD PRESSURE: 140 MMHG | DIASTOLIC BLOOD PRESSURE: 100 MMHG | HEART RATE: 81 BPM | RESPIRATION RATE: 20 BRPM | OXYGEN SATURATION: 96 %

## 2018-07-12 DIAGNOSIS — F41.1 GAD (GENERALIZED ANXIETY DISORDER): ICD-10-CM

## 2018-07-12 DIAGNOSIS — M47.819 FACET ARTHROPATHY: Primary | ICD-10-CM

## 2018-07-12 DIAGNOSIS — M47.816 SPONDYLOSIS OF LUMBAR REGION WITHOUT MYELOPATHY OR RADICULOPATHY: ICD-10-CM

## 2018-07-12 PROCEDURE — 99153 MOD SED SAME PHYS/QHP EA: CPT | Performed by: PSYCHIATRY & NEUROLOGY

## 2018-07-12 PROCEDURE — 64636 DESTROY L/S FACET JNT ADDL: CPT | Mod: RT | Performed by: PSYCHIATRY & NEUROLOGY

## 2018-07-12 PROCEDURE — 99152 MOD SED SAME PHYS/QHP 5/>YRS: CPT | Performed by: PSYCHIATRY & NEUROLOGY

## 2018-07-12 PROCEDURE — 64635 DESTROY LUMB/SAC FACET JNT: CPT | Mod: RT | Performed by: PSYCHIATRY & NEUROLOGY

## 2018-07-12 RX ORDER — OXYCODONE AND ACETAMINOPHEN 5; 325 MG/1; MG/1
1-2 TABLET ORAL EVERY 4 HOURS PRN
Qty: 40 TABLET | Refills: 0 | Status: SHIPPED | OUTPATIENT
Start: 2018-07-12 | End: 2018-08-14

## 2018-07-12 RX ORDER — OXYCODONE AND ACETAMINOPHEN 5; 325 MG/1; MG/1
1-2 TABLET ORAL EVERY 4 HOURS PRN
Qty: 40 TABLET | Refills: 0 | Status: SHIPPED | OUTPATIENT
Start: 2018-07-12 | End: 2018-07-12

## 2018-07-12 ASSESSMENT — PAIN SCALES - GENERAL: PAINLEVEL: EXTREME PAIN (9)

## 2018-07-12 NOTE — NURSING NOTE
Discharge Information    IV Discontiued Time:  NA    Amount of Fluid Infused:  NA    Discharge Criteria = When patient returns to baseline or as per MD order    Consciousness:  Pt is fully awake    Circulation:  BP +/- 20% of pre-procedure level    Respiration:  Patient is able to breathe deeply    O2 Sat:  Patient is able to maintain O2 Sat >92% on room air    Activity:  Moves 4 extremities on command    Ambulation:  Patient is able to stand and walk or stand and pivot into wheelchair    Dressing:  Clean/dry or No Dressing    Notes:   Discharge instructions and AVS given to patient    Patient meets criteria for discharge?  YES    Admitted to PCU?  No    Responsible adult present to accompany patient home?  Yes    Signature/Title:    dariel kennedy RN Care Coordinator  [facility]

## 2018-07-12 NOTE — PROGRESS NOTES
Pre procedure Diagnosis: facet arthropathy   Post procedure Diagnosis: Same  Procedure performed: Right L3,L4,L5 radiofrequency ablation   Anesthesia: moderate sedation with Midazolam 1.5 mg and Fentanyl 50 mcg  Complications: none  Operators: Ashlyn Gamble MD, and Rajesh Alanis MD    Indications:   Jailene Breen is a 51 year old male was sent by Susana Pike for lumbar facet medial branch blocks and radiofrequency ablation.  They have a history of chronic, axial low back pain.  Exam shows pain with lumbar extension and side-bending, and they have tried conservative treatment including PT and medications.    Jailene Breen had medial branch blocks showing appropriate pain relief, therefore radiofrequency ablation will be done.     Options/alternatives, benefits and risks were discussed with the patient including bleeding, infection, no pain relief, tissue trauma, exposure to radiation, reaction to medications including seizure, spinal cord injury,increased pain after the procedure, weakness, numbness or sensory changes and headache.   We also discussed risks of sedation, including reaction to medications and cardiovascular collapse.    Questions were answered to his satisfaction and he agrees to proceed. Voluntary informed consent was obtained and signed.     Vitals were reviewed: Yes  Allergies were reviewed:  Yes   Medications were reviewed:  Yes   Pre-procedure pain score: 10/10    Procedure:  After getting informed consent, patient was brought into the procedure suite and was placed in a prone position on the procedure table.   A Pause for the Cause was performed.  Patient was prepped and draped in sterile fashion.     Jailene Breen had an IV line placed prior the procedure.  The C-arm was positioned in the right oblique view to afford optimal view of the L4-L5 vertebral bodies. Lidocaine 1% with bicarbonate was used to anesthetize the skin at each level.  At each level, a 10cm, 20G curved  radiofrequency cannula with a 10mm active tip was positioned, overlying the intersection of the transverse process and pedicle at L4 & L5, and was advanced under intermittent fluoroscopy until it contacted the transverse process and notch, and the tip slightly overran that process, just lateral to the mamillary process.  The position of each cannula was verified and optimized in the oblique view and AP views.    In the AP view, another cannula was placed at the sacral alar notch.      Each position was tested for motor and sensory stimulation, and was positioned so that stimulation was negative for stimuli outside the immediate area of the desired lesion.  Sensory stimulation was completed at 50 Hz, with max stimulation up to 0.8V.  Motor stimulation was completed at 2Hz, up to 2.5V.   Bupivacaine mixed 1:1 with lidocaine 1% 1ml was injected, and a 90 second, 80 degree Centigrade lesion was generated.    The needles were then rotated within the pathway of the medial branch, and locations were evaluated with repeat imaging.  Motor testing was again completed, and showed appropriate stimulation.  A second lesion was then generated at each location.    The needles were flushed with the local mixture as they were withdrawn.  Hemostasis was achieved.      Hemostasis was achieved, the area was cleaned, and bandaids were placed when appropriate.  The patient tolerated the procedure well, and was taken to the recovery room.    Images were saved to PACS.    Start sedation time: 0950  End sedation time: 1020    Post-procedure pain score: 7/10  Follow-up includes:   -f/u phone call in one week  -post-procedure pain medications: oxycodone 5-10 mg PO Q6 hours PRN pain  -f/u with Susana Pike as scheduled    Ashlyn Gamble MD  [facility]       [facility] Procedure Note        Sedation:      Performed by: Silvia Gamble  Authorized by: Silvia Gamble    Pre-Procedure Assessment done at  0930.    Expected Level:  Moderate Sedation    Indication:  Sedation is required to allow for RFA    Consent obtained from patient after discussing the risks, benefits and alternatives.    PO Intake:  Appropriately NPO for procedure    ASA Class:  Class 2 - MILD SYSTEMIC DISEASE, NO ACUTE PROBLEMS, NO FUNCTIONAL LIMITATIONS.    Mallampati:  Grade 2:  Soft palate, base of uvula, tonsillar pillars, and portion of posterior pharyngeal wall visible    Lungs: Lungs Clear with good breath sounds bilaterally.     Heart: Normal heart sounds and rate    History and physical reviewed and no updates needed. I have reviewed the lab findings, diagnostic data, medications, and the plan for sedation. I have determined this patient to be an appropriate candidate for the planned sedation and procedure and have reassessed the patient IMMEDIATELY PRIOR to sedation and procedure.      Sedation Post Procedure Summary:    Prior to the start of the procedure and with procedural staff participation, I verbally confirmed the patient s identity using two indicators, relevant allergies, that the procedure was appropriate and matched the consent or emergent situation, and that the correct equipment/implants were available. Immediately prior to starting the procedure I conducted the Time Out with the procedural staff and re-confirmed the patient s name, procedure, and site/side. (The Joint Commission universal protocol was followed.)  Yes      Sedatives: Fentanyl and Midazolam (Versed)    Vital signs, airway and pulse oximetry were monitored and remained stable throughout the procedure and sedation was maintained until the procedure was complete.  The patient was monitored by staff until sedation discharge criteria were met.    Patient tolerance: Patient tolerated the procedure well with no immediate complications.    Time of sedation in minutes:  30 minutes from beginning to end of physician one to one monitoring.

## 2018-07-12 NOTE — NURSING NOTE
Pre-procedure Intake    Have you been fasting? YES    If yes, for how long? Nothing to eat since last night    Are you taking a prescribed blood thinner such as coumadin, Plavix, Xarelto?    No    If yes, when did you take your last dose? NA    Do you take aspirin?  No    If cervical procedure, have you held aspirin for 6 days?   NA    Do you have any allergies to contrast dye, iodine, steroid and/or numbing medications?  NO    Are you currently taking antibiotics or have an active infection?  NO    Have you had a fever/elevated temperature within the past week? NO    Are you currently taking oral steroids? NO    Do you have a ? Yes       Are you pregnant or breastfeeding?  Not Applicable    Are the vital signs normal?  No: Elevated BP      Monica Palma CMA (AAMA)

## 2018-07-12 NOTE — PATIENT INSTRUCTIONS
[facility]   Radiofrequency Ablation (RFA) Discharge Instructions     You should anticipate procedural pain for up to 2 weeks.   Stop the oxycodone  percocet has been prescribed for you.  You can take 1-2 tabs every 4 hours as needed.  Max of 8 tabs/day  It may take up to 8 weeks to receive relief from the RFA   Follow up with JUANITA Ramon CNP as scheduled  If you received sedation before, during or after your procedure, for the next 24 hours you shall NOT:    -Drive    -Operate machinery    -Drink alcohol    -Sign any legal documents   You may resume your normal diet   You may resume your regular medications after the procedure   Be cautious with walking. Numbness and/or weakness in the lower extremities may occur for up to 6-8 hours due to effect of local anesthetic  Avoid strenuous activity for the first 24 hours   You may resume your regular activities after 24 hours   You may shower, however no swimming, tub baths or hot tubs for 24 hours following your procedure   You may use ice packs 10-15 minutes three to four times a day at the injection site for comfort   Do not use heat to painful areas for 6 to 8 hours. This will give the local anesthetic time to wear off and prevent you from accidentally burning your skin.   You may use anti-inflammatory medications (such as Ibuprofen or Aleve or Advil) or Tylenol for pain control if necessary   If you experience any of the following, call the Pain Clinic during work hours at [phone] or the Provider Line after hours at [phone]:   -Fever over 100 degree F    -Swelling, bleeding, redness, drainage, warmth at the injection site    -Progressive weakness or numbness on your legs or arms    -Loss of bowel or bladder function    -Unusual headache that is not relieved by Tylenol    -Unusual new onset of pain that is not improving

## 2018-07-12 NOTE — NURSING NOTE
MD Time IN: 1018   Sedation start time:  1021  MD Time OUT:  1049    Medications given: fentanyl 75 mcg IV; versed 1 mg IV  Intravenous fluids were administered, normal saline 250 cc's.  Sedation Level Achieved:  Minimal sedation    Cha Molina RN-BSN  [facility]

## 2018-07-12 NOTE — NURSING NOTE
20 gauge Peripheral IV inserted into left anticubital - attempts: 1    Cha Molina, RN-BSN  [facility]

## 2018-07-12 NOTE — PROGRESS NOTES
Pre procedure Diagnosis: facet arthropathy   Post procedure Diagnosis: Same  Procedure performed: Repeat right L3,L4,L5 radiofrequency ablation   Anesthesia: moderate sedation with Midazolam 1mg and Fentanyl 75 mcg  Complications: none  Operators: Ashlyn Gamble MD    Indications:   Jailene Breen is a 52 year old male was sent by Susana Pike for repeat radiofrequency ablation.  Last done 5/31/17 with 70% improvement for 6 months.  They have a history of chronic, axial low back pain.  Exam shows pain with lumbar extension and side-bending, and they have tried conservative treatment including PT and medications.    Options/alternatives, benefits and risks were discussed with the patient including bleeding, infection, no pain relief, tissue trauma, exposure to radiation, reaction to medications including seizure, spinal cord injury,increased pain after the procedure, weakness, numbness or sensory changes and headache.   We also discussed risks of sedation, including reaction to medications and cardiovascular collapse.    Questions were answered to his satisfaction and he agrees to proceed. Voluntary informed consent was obtained and signed.     Vitals were reviewed: Yes  Allergies were reviewed:  Yes   Medications were reviewed:  Yes   Pre-procedure pain score: 9/10    Procedure:  After getting informed consent, patient was brought into the procedure suite and was placed in a prone position on the procedure table.   A Pause for the Cause was performed.  Patient was prepped and draped in sterile fashion.     Jailene Breen had an IV line placed prior the procedure.  The C-arm was positioned in the right oblique view to afford optimal view of the L4-L5 vertebral bodies. Lidocaine 1% with bicarbonate was used to anesthetize the skin at each level.  At each level, a 10cm, 20G curved radiofrequency cannula with a 10mm active tip was positioned, overlying the intersection of the transverse process and pedicle at L4 &  L5, and was advanced under intermittent fluoroscopy until it contacted the transverse process and notch, and the tip slightly overran that process, just lateral to the mamillary process.  The position of each cannula was verified and optimized in the oblique view and AP views.    In the AP view, another cannula was placed at the sacral alar notch.      Each position was tested for motor and sensory stimulation, and was positioned so that stimulation was negative for stimuli outside the immediate area of the desired lesion.  Sensory stimulation was completed at 50 Hz, with max stimulation up to 0.8V.  Motor stimulation was completed at 2Hz, up to 2.5V.   Bupivacaine mixed 1:1 with lidocaine 1% 1ml was injected, and a 90 second, 80 degree Centigrade lesion was generated.    The needles were then rotated within the pathway of the medial branch, and locations were evaluated with repeat imaging.  Motor testing was again completed, and showed appropriate stimulation.  A second lesion was then generated at each location.    The needles were flushed with the local mixture as they were withdrawn.  Hemostasis was achieved.      Hemostasis was achieved, the area was cleaned, and bandaids were placed when appropriate.  The patient tolerated the procedure well, and was taken to the recovery room.    Images were saved to PACS.    Start sedation time: 1021  End sedation time: 1049    Post-procedure pain score: 5/10  Follow-up includes:   -f/u phone call in one week  -post-procedure pain medications: oxycodone 5-10 mg PO Q6 hours PRN pain  -f/u with Susana Pike as scheduled    Ashlyn Gamble MD  [facility]       [facility] Procedure Note        Sedation:      Performed by: Silvia Gamble  Authorized by: Silvia Gamble    Pre-Procedure Assessment done at 1000    Expected Level:  Moderate Sedation    Indication:  Sedation is required to allow for RFA    Consent obtained from patient after  discussing the risks, benefits and alternatives.    PO Intake:  Appropriately NPO for procedure    ASA Class:  Class 2 - MILD SYSTEMIC DISEASE, NO ACUTE PROBLEMS, NO FUNCTIONAL LIMITATIONS.    Mallampati:  Grade 2:  Soft palate, base of uvula, tonsillar pillars, and portion of posterior pharyngeal wall visible    Lungs: Lungs Clear with good breath sounds bilaterally.     Heart: Normal heart sounds and rate    History and physical reviewed and no updates needed. I have reviewed the lab findings, diagnostic data, medications, and the plan for sedation. I have determined this patient to be an appropriate candidate for the planned sedation and procedure and have reassessed the patient IMMEDIATELY PRIOR to sedation and procedure.      Sedation Post Procedure Summary:    Prior to the start of the procedure and with procedural staff participation, I verbally confirmed the patient s identity using two indicators, relevant allergies, that the procedure was appropriate and matched the consent or emergent situation, and that the correct equipment/implants were available. Immediately prior to starting the procedure I conducted the Time Out with the procedural staff and re-confirmed the patient s name, procedure, and site/side. (The Joint Commission universal protocol was followed.)  Yes      Sedatives: Fentanyl and Midazolam (Versed)    Vital signs, airway and pulse oximetry were monitored and remained stable throughout the procedure and sedation was maintained until the procedure was complete.  The patient was monitored by staff until sedation discharge criteria were met.    Patient tolerance: Patient tolerated the procedure well with no immediate complications.    Time of sedation in minutes:  28 minutes from beginning to end of physician one to one monitoring.

## 2018-07-12 NOTE — MR AVS SNAPSHOT
After Visit Summary   7/12/2018    Jailene Breen    MRN: [id number]           Patient Information     Date Of Birth          1965        Visit Information        Provider Department      7/12/2018 9:45 AM Silvia Gamble MD [facility]        Today's Diagnoses     Facet arthropathy    -  1      Care Instructions    [facility]   Radiofrequency Ablation (RFA) Discharge Instructions     You should anticipate procedural pain for up to 2 weeks.   Stop the oxycodone  percocet has been prescribed for you.  You can take 1-2 tabs every 4 hours as needed.  Max of 8 tabs/day  It may take up to 8 weeks to receive relief from the RFA   Follow up with JUANITA Ramon CNP as scheduled  If you received sedation before, during or after your procedure, for the next 24 hours you shall NOT:    -Drive    -Operate machinery    -Drink alcohol    -Sign any legal documents   You may resume your normal diet   You may resume your regular medications after the procedure   Be cautious with walking. Numbness and/or weakness in the lower extremities may occur for up to 6-8 hours due to effect of local anesthetic  Avoid strenuous activity for the first 24 hours   You may resume your regular activities after 24 hours   You may shower, however no swimming, tub baths or hot tubs for 24 hours following your procedure   You may use ice packs 10-15 minutes three to four times a day at the injection site for comfort   Do not use heat to painful areas for 6 to 8 hours. This will give the local anesthetic time to wear off and prevent you from accidentally burning your skin.   You may use anti-inflammatory medications (such as Ibuprofen or Aleve or Advil) or Tylenol for pain control if necessary   If you experience any of the following, call the Pain Clinic during work hours at [phone] or the Provider Line after hours at [phone]:   -Fever over 100 degree F    -Swelling, bleeding,  redness, drainage, warmth at the injection site    -Progressive weakness or numbness on your legs or arms    -Loss of bowel or bladder function    -Unusual headache that is not relieved by Tylenol    -Unusual new onset of pain that is not improving              Follow-ups after your visit        Your next 10 appointments already scheduled     Aug 14, 2018  8:00 AM CDT   Return Visit with JUANITA Ga CNP   [facility] ([facility])    [address]  [place] [address] [phone]              Who to contact     If you have questions or need follow up information about today's clinic visit or your schedule please contact [facility] directly at [phone].  Normal or non-critical lab and imaging results will be communicated to you by [email or web address], letter or phone within 4 business days after the clinic has received the results. If you do not hear from us within 7 days, please contact the clinic through [email or web address] or phone. If you have a critical or abnormal lab result, we will notify you by phone as soon as possible.  Submit refill requests through SpaceList or call your pharmacy and they will forward the refill request to us. Please allow 3 business days for your refill to be completed.          Additional Information About Your Visit        SpaceList Information     SpaceList gives you secure access to your electronic health record. If you see a primary care provider, you can also send messages to your care team and make appointments. If you have questions, please call your primary care clinic.  If you do not have a primary care provider, please call [phone] and they will assist you.        Care EveryWhere ID     This is your Care EveryWhere ID. This could be used by other organizations to access your [place] medical records  [id number]        Your Vitals Were     Pulse Respirations Pulse Oximetry             81 20 96%          Blood Pressure  from Last 3 Encounters:   07/12/18 (!) 140/100   05/10/18 139/89   04/11/18 138/86    Weight from Last 3 Encounters:   04/11/18 119.3 kg (263 lb)   02/20/18 115.7 kg (255 lb)   02/20/18 115.7 kg (255 lb)              Today, you had the following     No orders found for display         Today's Medication Changes          These changes are accurate as of 7/12/18 10:52 AM.  If you have any questions, ask your nurse or doctor.               Start taking these medicines.        Dose/Directions    oxyCODONE-acetaminophen 5-325 MG per tablet   Commonly known as:  PERCOCET   Used for:  Facet arthropathy   Started by:  Silvia Gamble MD        Dose:  1-2 tablet   Take 1-2 tablets by mouth every 4 hours as needed for pain . Max of 8/day. For procedure pain.  Stop oxycodone while taking this.   Quantity:  40 tablet   Refills:  0            Where to get your medicines      Some of these will need a paper prescription and others can be bought over the counter.  Ask your nurse if you have questions.     Bring a paper prescription for each of these medications     oxyCODONE-acetaminophen 5-325 MG per tablet               Information about OPIOIDS     PRESCRIPTION OPIOIDS: WHAT YOU NEED TO KNOW   We gave you an opioid (narcotic) pain medicine. It is important to manage your pain, but opioids are not always the best choice. You should first try all the other options your care team gave you. Take this medicine for as short a time (and as few doses) as possible.     These medicines have risks:    DO NOT drive when on new or higher doses of pain medicine. These medicines can affect your alertness and reaction times, and you could be arrested for driving under the influence (DUI). If you need to use opioids long-term, talk to your care team about driving.    DO NOT operate heave machinery    DO NOT do any other dangerous activities while taking these medicines.     DO NOT drink any alcohol while taking these medicines.      If  the opioid prescribed includes acetaminophen, DO NOT take with any other medicines that contain acetaminophen. Read all labels carefully. Look for the word  acetaminophen  or  Tylenol.  Ask your pharmacist if you have questions or are unsure.    You can get addicted to pain medicines, especially if you have a history of addiction (chemical, alcohol or substance dependence). Talk to your care team about ways to reduce this risk.    Store your pills in a secure place, locked if possible. We will not replace any lost or stolen medicine. If you don t finish your medicine, please throw away (dispose) as directed by your pharmacist. The [place] Pollution Control Agency has more information about safe disposal: https://www.pca.[facility].[place].us/living-green/managing-unwanted-medications.     All opioids tend to cause constipation. Drink plenty of water and eat foods that have a lot of fiber, such as fruits, vegetables, prune juice, apple juice and high-fiber cereal. Take a laxative (Miralax, milk of magnesia, Colace, Senna) if you don t move your bowels at least every other day.          Primary Care Provider Office Phone # Fax #    JASON Rice [phone] [address] [place] [address]        Equal Access to Services     SARATH ESPINAL : John moreo Sosarah, waaxda luqadaha, qaybta kaalmada adeegyada, stephanie paredes. So [facility] [phone].    ATENCIÓN: Si habla español, tiene a carrington disposición servicios gratuitos de asistencia lingüística. Llame al [phone].    We comply with applicable federal civil rights laws and [place] laws. We do not discriminate on the basis of race, color, national origin, age, disability, sex, sexual orientation, or gender identity.            Thank you!     Thank you for choosing [facility]  for your care. Our goal is always to provide you with excellent care. Hearing back from our patients is one way  we can continue to improve our services. Please take a few minutes to complete the written survey that you may receive in the mail after your visit with us. Thank you!             Your Updated Medication List - Protect others around you: Learn how to safely use, store and throw away your medicines at www.disposemymeds.org.          This list is accurate as of 7/12/18 10:52 AM.  Always use your most recent med list.                   Brand Name Dispense Instructions for use Diagnosis    buPROPion 150 MG 12 hr tablet    WELLBUTRIN SR    180 tablet    Take 1 tablet (150 mg) by mouth 2 times daily    Anxiety, Tobacco abuse       CELEBREX PO           clonazePAM 1 MG tablet    klonoPIN    10 tablet    Take 0.5-1 tablets (0.5-1 mg) by mouth 2 times daily as needed for anxiety    JEFF (generalized anxiety disorder)       cyclobenzaprine 10 MG tablet    FLEXERIL    30 tablet    Take 0.5-1 tablets (5-10 mg) by mouth 3 times daily as needed for muscle spasms Caution sedation    Back muscle spasm, Myofascial pain       folic acid 1 MG tablet    FOLVITE     Reported on 5/19/2017        guaiFENesin-codeine 100-10 MG/5ML Soln solution    ROBITUSSIN AC    120 mL    Take 5 mLs by mouth every 4 hours as needed for cough    Cough       HUMIRA PEN 40 MG/0.8ML pen kit   Generic drug:  adalimumab           IBUPROFEN PO      Take 800 mg by mouth every 6 hours as needed for moderate pain Reported on 5/19/2017        ketoconazole 2 % shampoo    NIZORAL    120 mL    Apply to the affected area and wash off after 5 minutes.use until skin condition is better    Seborrheic dermatitis       methotrexate 2.5 MG tablet CHEMO      6 tablets Reported on 4/3/2017        nicotine polacrilex 2 MG gum    EQ NICOTINE POLACRILEX    170 tablet    Place 1 each (2 mg) inside cheek every 4 hours as needed for smoking cessation    Tobacco use disorder       nortriptyline 50 MG capsule    PAMELOR    90 capsule    Take 1 capsule (50 mg) by mouth At Bedtime     Anxiety, Other chronic pain       oxyCODONE IR 5 MG tablet    ROXICODONE    45 tablet    Take 1 tablet every 6-8 hours as needed for pain, max of 2 tabs per day but cannot take 2 tabs every day.  Okay to fill on 7/17/2018 and start on 7/192018. To last 30 days. No further early refills    Chronic pain of left knee, Chronic bilateral low back pain with right-sided sciatica       oxyCODONE-acetaminophen 5-325 MG per tablet    PERCOCET    40 tablet    Take 1-2 tablets by mouth every 4 hours as needed for pain . Max of 8/day. For procedure pain.  Stop oxycodone while taking this.    Facet arthropathy       predniSONE 20 MG tablet    DELTASONE    5 tablet    Take 1 tablet (20 mg) by mouth daily    Acute bronchitis, unspecified organism, Acute recurrent maxillary sinusitis

## 2018-07-13 RX ORDER — CLONAZEPAM 1 MG/1
0.5-1 TABLET ORAL 2 TIMES DAILY PRN
Qty: 10 TABLET | Refills: 0 | Status: SHIPPED | OUTPATIENT
Start: 2018-07-13 | End: 2018-09-14

## 2018-07-13 NOTE — TELEPHONE ENCOUNTER
Routing refill request to provider for review/approval because:  Drug not on the G refill protocol     clonazePAM (KLONOPIN) 1 MG tablet 10 tablet 0 5/24/2018  No   Sig - Route: Take 0.5-1 tablets (0.5-1 mg) by mouth 2 times daily as needed for anxiety - Oral   Class: Local Print     Last visit: 7/28/2017    Ida Hirsch RN, [facility] Triage

## 2018-07-17 ENCOUNTER — TELEPHONE (OUTPATIENT)
Dept: PALLIATIVE MEDICINE | Facility: CLINIC | Age: 53
End: 2018-07-17

## 2018-07-17 NOTE — TELEPHONE ENCOUNTER
BRAEDEN FROM [facility] LM ON VM 7/17 @ 8:13A    HE HAS QUESTIONS REGARDING PT'S OXYCODONE 5MG RX    HE WOULD LIKE A CALL BACK [phone]      Pj BARBOSA    [facility] [place]

## 2018-07-17 NOTE — TELEPHONE ENCOUNTER
Already spoke to Kuldeep regarding this issue. It has been resolved.     Crissy CHURCHILLN-RN Care Coordinator  [facility] [place]

## 2018-07-19 ENCOUNTER — TELEPHONE (OUTPATIENT)
Dept: PALLIATIVE MEDICINE | Facility: CLINIC | Age: 53
End: 2018-07-19

## 2018-07-19 NOTE — TELEPHONE ENCOUNTER
Patient had a radiofrequency ablation (RFA) on 7/12/18.  Called patient for an update.    Left message that we were calling for an update about how s/he was doing after the procedure and that if s/he has any questions or concerns to call the clinic at [phone].      Mervat TAPIA (R)

## 2018-07-20 NOTE — TELEPHONE ENCOUNTER
Please ask if this is baseline pain, or flare from procedure.    -If this is his baseline pain, then he needs to work with Susana Pike, his provider.  -if flare, it could still be going on.  He got Percocet on 7/12, but he also has oxycodone available to  on 7/19/18.  So please make sure he has picked that up.    He is to use that at 2/day, but for flare, coulduse 4/day x 5 days, then go back down.    Can take up to 2 weeks for radiofrequency ablation to fully work.    Ashlyn Gamble MD  [facility]

## 2018-07-20 NOTE — TELEPHONE ENCOUNTER
Dr. Gamble.  Is this something that we would extend after an RFA on the 12th?    Josias Lopez, RN  Care Coordinator   [facility] [place]

## 2018-07-20 NOTE — TELEPHONE ENCOUNTER
Patient left  7/19 at 7:34 pm    Returning call to nurse, patient is still in a lot of pain and he is basically out of medication.          Yasemin MILLAN    [facility]

## 2018-07-20 NOTE — TELEPHONE ENCOUNTER
Dr. Gamble called writer about pt and Pain.    Pt received Rx for Oxycodone on 7/12/18 (5 day supply)      Writer called pt, Pt reduced back to 2 tablets per day after Rx on 7/12/18 was completed.  Pt picked up Rx for 45 tablets on 7/17 to start on 7/19.  Pt instructed from Dr. Gamble that it's ok to increase from 2 per day to 4 per day for 5 days.    Rx filled on 7/17/18, Pt will run out on 8/13/18, this will be his new start date for next Rx.    Will forward to Susana Pike as FYI.    Pt notified, Pt agrees with koby Lopez, RN  Care Coordinator   [facility] [place]

## 2018-07-24 ENCOUNTER — MYC MEDICAL ADVICE (OUTPATIENT)
Dept: FAMILY MEDICINE | Facility: CLINIC | Age: 53
End: 2018-07-24

## 2018-07-24 DIAGNOSIS — F17.200 TOBACCO USE DISORDER: Primary | ICD-10-CM

## 2018-07-24 RX ORDER — POLYETHYLENE GLYCOL 3350 17 G
2 POWDER IN PACKET (EA) ORAL
Qty: 360 LOZENGE | Refills: 1 | Status: SHIPPED | OUTPATIENT
Start: 2018-07-24 | End: 2019-05-15

## 2018-08-07 ENCOUNTER — MYC MEDICAL ADVICE (OUTPATIENT)
Dept: PALLIATIVE MEDICINE | Facility: CLINIC | Age: 53
End: 2018-08-07

## 2018-08-07 DIAGNOSIS — M25.562 CHRONIC PAIN OF LEFT KNEE: ICD-10-CM

## 2018-08-07 DIAGNOSIS — M54.41 CHRONIC BILATERAL LOW BACK PAIN WITH RIGHT-SIDED SCIATICA: ICD-10-CM

## 2018-08-07 DIAGNOSIS — G89.29 CHRONIC PAIN OF LEFT KNEE: ICD-10-CM

## 2018-08-07 DIAGNOSIS — M47.819 FACET ARTHROPATHY: ICD-10-CM

## 2018-08-07 DIAGNOSIS — G89.29 CHRONIC BILATERAL LOW BACK PAIN WITH RIGHT-SIDED SCIATICA: ICD-10-CM

## 2018-08-07 RX ORDER — OXYCODONE HYDROCHLORIDE 5 MG/1
TABLET ORAL
Qty: 45 TABLET | Refills: 0 | Status: SHIPPED | OUTPATIENT
Start: 2018-08-07 | End: 2018-08-07

## 2018-08-07 NOTE — TELEPHONE ENCOUNTER
Will resend to Dr. Holley to sign new Rx due to dates being wrong on previous Rx.  Writer's mistake.      Thanks      Josias Lopez, RN  Care Coordinator   [facility] [place]

## 2018-08-07 NOTE — TELEPHONE ENCOUNTER
Received call from patient requesting refill(s) of oxyCODONE IR (ROXICODONE) 5 MG tablet and oxyCODONE-acetaminophen (PERCOCET) 5-325 MG per tablet    Last picked up from pharmacy on Percocet on 07/12/18    Oxycodone on 07/17/18    Pt last seen by prescribing provider on 04/11/18- has had injections since  Next appt scheduled for 08/14/18     checked in the past 6 months? Yes If no, print current report and give to RN    Last urine drug screen date 04/11/18  Current opioid agreement on file (completed within the last year) Yes Date of opioid agreement: 04/11/18    Processing (pick one and delete the others):        Pt will  in clinic at [place] location      Will route to nursing pool for review and preparation of prescription(s).

## 2018-08-07 NOTE — TELEPHONE ENCOUNTER
Patient came into clinic looking for Rx- stated he got a call stating it was ready and he was on his way. No Rx up front no documentation stating it was ready. Please call pateint to advise.

## 2018-08-07 NOTE — TELEPHONE ENCOUNTER
Medication refill information reviewed.     Due date for oxyCODONE IR (ROXICODONE) 5 MG tablet is 8/13/18, Pt was instructed by Dr. Gambel after RFA OK to increase for 5 days,  Due date reflects this change.      Prescriptions prepped for review.     Will route to provider.       Josias Lopez, RN  Care Coordinator   [facility] [place]

## 2018-08-07 NOTE — TELEPHONE ENCOUNTER
Routed to the nursing pool and to the MA pool to process refill(s).    Cha Molina, RN-BSN  [facility] BARBOSA

## 2018-08-08 RX ORDER — OXYCODONE HYDROCHLORIDE 5 MG/1
TABLET ORAL
Qty: 45 TABLET | Refills: 0 | Status: SHIPPED | OUTPATIENT
Start: 2018-08-08 | End: 2018-09-07

## 2018-08-08 NOTE — TELEPHONE ENCOUNTER
Signed Rx for oxycodone was placed in  bin, , 2nd floor, [place]. Patient was informed.      Nicolasa Krueger MA

## 2018-08-11 ENCOUNTER — TELEPHONE (OUTPATIENT)
Dept: NURSING | Facility: CLINIC | Age: 53
End: 2018-08-11

## 2018-08-11 NOTE — TELEPHONE ENCOUNTER
"  [facility] Triage Call  Presenting Problem:\"I tried filling my RX for   oxyCODONE IR (ROXICODONE) 5 MG tablet 45 tablet 0 8/8/2018  No   Sig: Take 1 tablet every 6-8 hours as needed for pain, max of 2 tabs per day but cannot take 2 tabs every day.  Okay to fill on 8/11/2018 and start on 8/13/2018. To last 30 days. No further early refills       Patient Recommendations/Teaching:Called [facility] pharmacy.\" We can not refill until tomorrow 8/12. He got another RX from Dr. Fitzpatrick in July with the same directions. I see the note from the last RX but insurance won't pay for it because it's too soon and directions have not changed. \"  Marci Sterling RN [facility]            "

## 2018-08-14 ENCOUNTER — TELEPHONE (OUTPATIENT)
Dept: PALLIATIVE MEDICINE | Facility: CLINIC | Age: 53
End: 2018-08-14

## 2018-08-14 ENCOUNTER — OFFICE VISIT (OUTPATIENT)
Dept: PALLIATIVE MEDICINE | Facility: CLINIC | Age: 53
End: 2018-08-14
Payer: COMMERCIAL

## 2018-08-14 VITALS
WEIGHT: 265 LBS | SYSTOLIC BLOOD PRESSURE: 142 MMHG | BODY MASS INDEX: 32.26 KG/M2 | DIASTOLIC BLOOD PRESSURE: 95 MMHG | HEART RATE: 80 BPM

## 2018-08-14 DIAGNOSIS — M62.830 BACK MUSCLE SPASM: ICD-10-CM

## 2018-08-14 DIAGNOSIS — M53.3 SACROILIAC JOINT DYSFUNCTION OF BOTH SIDES: ICD-10-CM

## 2018-08-14 DIAGNOSIS — G57.03 PIRIFORMIS SYNDROME OF BOTH SIDES: Primary | ICD-10-CM

## 2018-08-14 DIAGNOSIS — M47.817 LUMBOSACRAL SPONDYLOSIS WITHOUT MYELOPATHY: ICD-10-CM

## 2018-08-14 DIAGNOSIS — M79.18 MYOFASCIAL PAIN: ICD-10-CM

## 2018-08-14 DIAGNOSIS — G89.4 CHRONIC PAIN SYNDROME: ICD-10-CM

## 2018-08-14 PROCEDURE — 99214 OFFICE O/P EST MOD 30 MIN: CPT | Performed by: NURSE PRACTITIONER

## 2018-08-14 RX ORDER — CYCLOBENZAPRINE HCL 10 MG
5-10 TABLET ORAL 3 TIMES DAILY PRN
Qty: 60 TABLET | Refills: 2 | Status: SHIPPED | OUTPATIENT
Start: 2018-08-14 | End: 2018-08-14

## 2018-08-14 ASSESSMENT — PAIN SCALES - GENERAL: PAINLEVEL: EXTREME PAIN (8)

## 2018-08-14 NOTE — MR AVS SNAPSHOT
After Visit Summary   8/14/2018    Jailene Breen    MRN: [id number]           Patient Information     Date Of Birth          1965        Visit Information        Provider Department      8/14/2018 8:00 AM Susana Pike APRN [facility]        Today's Diagnoses     Piriformis syndrome of both sides    -  1    Sacroiliac joint dysfunction of both sides        Lumbosacral spondylosis without myelopathy        Back muscle spasm        Myofascial pain          Care Instructions    PLAN  1. Medications:   1. Continue Oxycodone 5mg twice daily as needed.  2. Continue Flexeril 10mg take 0.5-1 tablet (5-10mg) by mouth 3 times daily as needed for muscle spasms.   2. Procedures: Schedule piriformis muscle injections two weeks after completing prednisone, contact the office for further instructions.   3. Follow-up with your primary care provider and rheumatologist for further evaluation of breathing problems and foot pain.  4. If right foot pain remains an issue, consider seeing a podiatrist  5. Follow-up with me in 8-10 weeks        ----------------------------------------------------------------  Clinic Number:  [phone]   Call this number with any questions about your care and for scheduling assistance. Calls are returned Monday through Friday between 8 AM and 4:30 PM. We usually get back to you within 2 business days depending on the issue/request.       Medication refills:    For non-narcotic medications, call your pharmacy directly to request a refill. The pharmacy will contact the [facility] Management Center for authorization. Please allow 3-4 days for these refills to be processed.     For narcotic refills, call the nurse triage line or send a [email or web address] message. Please contact us 7-10 days before your refill is due. The message MUST include the name of the specific medication(s) requested and how you would like to receive the prescription(s). The options are as follows:    Pain  Clinic staff can mail the prescription to your pharmacy. Please tell us the name of the pharmacy.    You may pick the prescription up at the Pain Clinic (tell us the location) or during a clinic visit with your pain provider    Pain Clinic staff can deliver the prescription to the [facility] pharmacy in the clinic building. Please tell us the location.      We believe regular attendance is key to your success in our program.    Any time you are unable to keep your appointment we ask that you call us at least 24 hours in advance to let us know. This will allow us to offer the appointment time to another patient.               Follow-ups after your visit        Additional Services     PAIN INJECTION EVAL/TREAT/FOLLOW UP                 Your next 10 appointments already scheduled     Aug 20, 2018 10:00 AM CDT   Return Visit with Padilla Keene LP   [facility] [place] ([facility] [place])    [address]  [place] [address] [phone]              Who to contact     If you have questions or need follow up information about today's clinic visit or your schedule please contact [facility] directly at [phone].  Normal or non-critical lab and imaging results will be communicated to you by MyChart, letter or phone within 4 business days after the clinic has received the results. If you do not hear from us within 7 days, please contact the clinic through [employer] or phone. If you have a critical or abnormal lab result, we will notify you by phone as soon as possible.  Submit refill requests through [employer] or call your pharmacy and they will forward the refill request to us. Please allow 3 business days for your refill to be completed.          Additional Information About Your Visit        [employer] Information     [employer] gives you secure access to your electronic health record. If you see a primary care provider, you can also send messages to your care team and make  appointments. If you have questions, please call your primary care clinic.  If you do not have a primary care provider, please call [phone] and they will assist you.        Care EveryWhere ID     This is your Care EveryWhere ID. This could be used by other organizations to access your [facility] medical records  [id number]        Your Vitals Were     Pulse BMI (Body Mass Index)                80 32.26 kg/m2           Blood Pressure from Last 3 Encounters:   08/14/18 (!) 142/95   07/12/18 (!) 140/100   05/10/18 139/89    Weight from Last 3 Encounters:   08/14/18 120.2 kg (265 lb)   04/11/18 119.3 kg (263 lb)   02/20/18 115.7 kg (255 lb)              We Performed the Following     PAIN INJECTION EVAL/TREAT/FOLLOW UP          Where to get your medicines      These medications were sent to [id number] - [place] - [address] [address]     Phone:  [phone]     cyclobenzaprine 10 MG tablet          Primary Care Provider Office Phone # Fax #    JASON Rice [phone] [address] [facility] [address]        Equal Access to Services     SRAATH ESPINAL : Hadii kiran ku hadasho Soomaali, waaxda luqadaha, qaybta kaalmada adeegyada, stephanie paredes. So [facility] [phone].    ATENCIÓN: Si habla español, tiene a carrington disposición servicios gratuitos de asistencia lingüística. Llame al [phone].    We comply with applicable federal civil rights laws and [place] laws. We do not discriminate on the basis of race, color, national origin, age, disability, sex, sexual orientation, or gender identity.            Thank you!     Thank you for choosing [facility]  for your care. Our goal is always to provide you with excellent care. Hearing back from our patients is one way we can continue to improve our services. Please take a few minutes to complete the written survey that you may receive in the mail  after your visit with us. Thank you!             Your Updated Medication List - Protect others around you: Learn how to safely use, store and throw away your medicines at www.disposemymeds.org.          This list is accurate as of 8/14/18  8:36 AM.  Always use your most recent med list.                   Brand Name Dispense Instructions for use Diagnosis    buPROPion 150 MG 12 hr tablet    WELLBUTRIN SR    180 tablet    Take 1 tablet (150 mg) by mouth 2 times daily    Anxiety, Tobacco abuse       clonazePAM 1 MG tablet    klonoPIN    10 tablet    Take 0.5-1 tablets (0.5-1 mg) by mouth 2 times daily as needed for anxiety    JEFF (generalized anxiety disorder)       cyclobenzaprine 10 MG tablet    FLEXERIL    60 tablet    Take 0.5-1 tablets (5-10 mg) by mouth 3 times daily as needed for muscle spasms Caution sedation    Back muscle spasm, Myofascial pain       folic acid 1 MG tablet    FOLVITE     Reported on 5/19/2017        guaiFENesin-codeine 100-10 MG/5ML Soln solution    ROBITUSSIN AC    120 mL    Take 5 mLs by mouth every 4 hours as needed for cough    Cough       HUMIRA PEN 40 MG/0.8ML pen kit   Generic drug:  adalimumab           IBUPROFEN PO      Take 800 mg by mouth every 6 hours as needed for moderate pain Reported on 5/19/2017        ketoconazole 2 % shampoo    NIZORAL    120 mL    Apply to the affected area and wash off after 5 minutes.use until skin condition is better    Seborrheic dermatitis       methotrexate 2.5 MG tablet CHEMO      6 tablets Reported on 4/3/2017        * nicotine polacrilex 2 MG gum    EQ NICOTINE POLACRILEX    170 tablet    Place 1 each (2 mg) inside cheek every 4 hours as needed for smoking cessation    Tobacco use disorder       * nicotine polacrilex 2 MG lozenge    [employer]S NICOTINE POLACRILEX    360 lozenge    Place 1 lozenge (2 mg) inside cheek every hour as needed for smoking cessation    Tobacco use disorder       nortriptyline 50 MG capsule    PAMELOR    90 capsule    Take 1  capsule (50 mg) by mouth At Bedtime    Anxiety, Other chronic pain       oxyCODONE IR 5 MG tablet    ROXICODONE    45 tablet    Take 1 tablet every 6-8 hours as needed for pain, max of 2 tabs per day but cannot take 2 tabs every day.  Okay to fill on 8/11/2018 and start on 8/13/2018. To last 30 days. No further early refills    Chronic pain of left knee, Chronic bilateral low back pain with right-sided sciatica       predniSONE 20 MG tablet    DELTASONE    5 tablet    Take 1 tablet (20 mg) by mouth daily    Acute bronchitis, unspecified organism, Acute recurrent maxillary sinusitis       * Notice:  This list has 2 medication(s) that are the same as other medications prescribed for you. Read the directions carefully, and ask your doctor or other care provider to review them with you.

## 2018-08-14 NOTE — PATIENT INSTRUCTIONS
PLAN  1. Medications:   1. Continue Oxycodone 5mg twice daily as needed.  2. Continue Flexeril 10mg take 0.5-1 tablet (5-10mg) by mouth 3 times daily as needed for muscle spasms.   2. Procedures: Schedule piriformis muscle injections two weeks after completing prednisone, contact the office for further instructions.   3. Follow-up with your primary care provider and rheumatologist for further evaluation of breathing problems and foot pain.  4. If right foot pain remains an issue, consider seeing a podiatrist  5. Follow-up with me in 8-10 weeks        ----------------------------------------------------------------  Clinic Number:  [phone]   Call this number with any questions about your care and for scheduling assistance. Calls are returned Monday through Friday between 8 AM and 4:30 PM. We usually get back to you within 2 business days depending on the issue/request.       Medication refills:    For non-narcotic medications, call your pharmacy directly to request a refill. The pharmacy will contact the [facility] Management Center for authorization. Please allow 3-4 days for these refills to be processed.     For narcotic refills, call the nurse triage line or send a TVShow Time message. Please contact us 7-10 days before your refill is due. The message MUST include the name of the specific medication(s) requested and how you would like to receive the prescription(s). The options are as follows:    Pain Clinic staff can mail the prescription to your pharmacy. Please tell us the name of the pharmacy.    You may pick the prescription up at the Pain Clinic (tell us the location) or during a clinic visit with your pain provider    Pain Clinic staff can deliver the prescription to the [facility] pharmacy in the clinic building. Please tell us the location.      We believe regular attendance is key to your success in our program.    Any time you are unable to keep your appointment we ask that you call us at least 24 hours in  advance to let us know. This will allow us to offer the appointment time to another patient.

## 2018-08-14 NOTE — PROGRESS NOTES
[facility]    8/14/2018     Chief complaint: lower  back pain. Left knee pain. Numbness in right great toe      Interval history:  Jailene Breen is a 52 year old male is known to me for   Lumbar spondylosis, pain is worse with extension and rotation indicating a facetogenic component to pain  Lumbar degenerative disc disease  SI joint pain  Ankylosing spondylitis  Myofascial pain  Chronic pain syndrome  PMHx includes: Panic attacks, back pain, arthritis, anxiety, ankylosing spondylitis  PSHx includes: Right knee surgery ×2, left foot surgery right knee arthroscopy        Recommendations/plan at the last visit on 4/11/2018 included:  1. Recommended to increase activity while pacing himself  2. Physical Therapy:  I agree with left knee physical therapy  3. Clinical Health Psychologist: The patient will schedule a follow up with Padilla Keene  to address issues of relaxation, behavioral change, coping style, and other factors important to improvement.    4. Diagnostic Studies:  none  5. Medication Management:    1. Continue oxycodone  6. Further procedures recommended: schedule left lumbar medial branch blocks and proceed to lumbar RFA as indicated. Our office to contact pt  7. Recommendations to PCP: see above  8. UDS today, last took oxycodone 2 days ago, does not use this medication every day  9. Re-sign opiate agreement  10. Follow up: 12 weeks      Since his last visit, Jailene Breen reports:  -He is having a rheumatoid arthritis pain flare, with all over pain. The patient has numbness/tingling in the right foot. The dorsal forfoot is aggravated by walking and patient states it is painful to walk.   His back pain is consistent since last time, he denies any pain in the legs.  -The patient reports that he has a prescription for prednisone that he is currently using for his RA flare.   States he is generally not feeling well. Has had some lung congestion, like difficult to take a big  "deep breath. No dyspnea at present, advised to see Primary Care Provider re: breathing issues.   -had lumbar RFA done on 7/12/2018, starting to give some benefit in pain relief.       At this point, the patient's participation with our multidisciplinary team includes:  The patient has been compliant with the program.  Pain Group - not ordered  PT - has seen Cyndee White, none recently  Health Psych - seeing Padilla Keene, none since 9/2017      Pain scores:  Pain intensity on average is 7-8 on a scale of 0-10.    Range is 5-9/10.   Pain right now is 8/10.  Pain is described as \"penetrating, sharp, stabbing, and miserable.\"  Pain is constant in nature    Current pain relevant medications:   -nortriptyline 10-30mg at bedtime (10mg at bedtime, helpful)  -humira 40mg twice monthly(helpful)  -ibuprofen 800mg TID PRN (using 3 times daily-helpful)  -Tylenol 500mg using 1000mg TID (unsure if helpful)  -Maxalt 10mg PRN migraine (helpful for migraine--he does have visual aura)  -Oxycodone 5mg BID PRN (taking 1-2 tabs per day)   -Flexeril 10mg TID PRN    Other pertinent medications:  -clonazepam 1mg tab, may take 0.5-1mg BID PRN anxiety (helpful--last given #10 tabs on 7/13/2018)     Previous Medications:  OPIATES: Oxycodone (helpful), Fentanyl (100mcg/hr patch helpful), hydrocodone (itching), Tramadol (not helpful)   NSAIDS: ibuprofen (not helpful), Aleve (not helpful)  MUSCLE RELAXANTS: methocarbamol (somewhat helpful), Flexeril (somewhat helpful), tizanidine (unsure if helpful), metaxalone (unsure), SOMA (helpful)  ANTI-MIGRAINE MEDS: Maxalt (helpful for migraine), Imitrex injection (somewhat helpful)  ANTI-DEPRESSANTS: Prozac (helpful), Cymbalta (felt weird on it), nortriptyline (unsure if helpful), Buspar (unsure), Remeron (blacked out), bupropion (not helpful for smoking cessation)  SLEEP AIDS: Ambien (helpful)  ANTI-CONVULSANTS: gabapentin (felt odd on this medication, unsure of dose), Lyrica (not helpful for 4 " months, unsure of dose), Topamax (not helpful, he felt weird on it)   TOPICALS: Lidocaine patches (not helpful), Voltaren gel (not helpful)  Other meds: Tylenol (unsure if helpful)        Other treatments have included:  Jailene Breen has been seen at a pain clinic in the past.  [facility]  PT: tried, not helpful  Chiropractic: tried, not helpful  Acupuncture: none  TENs Unit: tried, helpful     Injections:   -has had injections at outside clinics  -has had epidural injections for low back pain (one was helpful)  -he has had lumbar medial branch blocks at [facility] and he was going to have lumbar radiofrequency ablation (did not do this due to cost)  -4/3/2017 right LMBBs with Dr. Ashlyn Gamble (helpful)  -4/26/2017 right LMBBs with Dr. Ashlyn Gamble (helpful)  -5/31/2017 right L3, L4, L5 RFA with Dr. Ashlyn Gamble (good relief for over 6 months)  -3/15/2018 right L5 transforaminal MAKAYLA with Dr. Ashlyn Gamble (not helpful)  -5/10/2018 trigger point injections with Dr. Ashlyn Gamble(somewhat helpful).  -7/12/2018 Right L3, L4, L5 RFA with Dr. Ashlyn Gamble (starting to help).          Side Effects: no side effect  Patient is using the medication as prescribed: YES    Medications:  Current Outpatient Prescriptions   Medication Sig Dispense Refill     clonazePAM (KLONOPIN) 1 MG tablet Take 0.5-1 tablets (0.5-1 mg) by mouth 2 times daily as needed for anxiety 10 tablet 0     cyclobenzaprine (FLEXERIL) 10 MG tablet Take 0.5-1 tablets (5-10 mg) by mouth 3 times daily as needed for muscle spasms Caution sedation 30 tablet 1     folic acid (FOLVITE) 1 MG tablet Reported on 5/19/2017       HUMIRA PEN 40 MG/0.8ML pen kit        IBUPROFEN PO Take 800 mg by mouth every 6 hours as needed for moderate pain Reported on 5/19/2017       methotrexate 2.5 MG tablet 6 tablets Reported on 4/3/2017       nicotine polacrilex ([employer]S NICOTINE POLACRILEX) 2 MG lozenge Place 1 lozenge (2 mg) inside cheek every hour as needed  for smoking cessation 360 lozenge 1     nicotine polacrilex (EQ NICOTINE POLACRILEX) 2 MG gum Place 1 each (2 mg) inside cheek every 4 hours as needed for smoking cessation 170 tablet 5     nortriptyline (PAMELOR) 50 MG capsule Take 1 capsule (50 mg) by mouth At Bedtime 90 capsule 1     oxyCODONE IR (ROXICODONE) 5 MG tablet Take 1 tablet every 6-8 hours as needed for pain, max of 2 tabs per day but cannot take 2 tabs every day.  Okay to fill on 8/11/2018 and start on 8/13/2018. To last 30 days. No further early refills 45 tablet 0     predniSONE (DELTASONE) 20 MG tablet Take 1 tablet (20 mg) by mouth daily 5 tablet 0     buPROPion (WELLBUTRIN SR) 150 MG 12 hr tablet Take 1 tablet (150 mg) by mouth 2 times daily (Patient not taking: Reported on 3/15/2018) 180 tablet 1     Celecoxib (CELEBREX PO)        guaiFENesin-codeine (ROBITUSSIN AC) 100-10 MG/5ML SOLN solution Take 5 mLs by mouth every 4 hours as needed for cough (Patient not taking: Reported on 3/15/2018) 120 mL 0     ketoconazole (NIZORAL) 2 % shampoo Apply to the affected area and wash off after 5 minutes.use until skin condition is better (Patient not taking: Reported on 3/15/2018) 120 mL 1     oxyCODONE-acetaminophen (PERCOCET) 5-325 MG per tablet Take 1-2 tablets by mouth every 4 hours as needed for pain . Max of 8/day. For procedure pain.  Stop oxycodone while taking this. (Patient not taking: Reported on 8/14/2018) 40 tablet 0       Medical History: any changes in medical history since they were last seen? No    Social History:   Home situation: , has 2 children in college  Occupation/Schooling: currently unemployed, worked as a  (unemployed since 3/1/2017)  Tobacco use: none  Alcohol use: none  Drug use: none  History of chemical dependency treatment: none    Is patient a current smoker or tobacco user?  no  If yes, was cessation counseling offered?  no        Review of Systems:  ROS is positive as per HPI as well as for HA, ringing in  ears, cough, SOB, arthritis, numbness/tingling, depression, anxiety, stress, and is negative for fevers, chills, sweats, or constipation, diarrhea.    This document serves as a record of the services and decisions personally performed and made by Susana GRANT. It was created on her behalf by Asif Carreno, a trained medical scribe. The creation of this document is based on the provider's statements to the medical scribe.  Asif Carreno 8:17 AM August 14, 2018        Physical Exam:  Vital signs: BP (!) 142/95  Pulse 80  Wt 120.2 kg (265 lb)  BMI 32.26 kg/m2     Behavioral observations:  Awake, alert, cooperative.     Gait:  normal    Musculoskeletal exam:    Moves well in the exam room  Strength grossly equal throughout  Lumbar flexion 85 degrees  Lumbar extension 25 degrees  Lumbar ext/rotation to right is mildly painful  Lumbar ext/rotation to the left is painful   Piriformis R>L  SI joint tenderness on the left side    Neuro exam:  SILT in all extremities     Skin/vascular/autonomic:  No suspicious lesions on exposed skin.      Other:  NA      [place] Prescription Monitoring Program:  Reveiwed    DIRE Score for selecting candidates for long term opioid analgesia for chronic pain:  Diagnosis  2  Intractablility  2  Risk    Psychological health  2    Chemical health  2    Reliability  2    Social support  2  Efficacy  2    Total DIRE Score = 14. Note that  7-13 predicts poor outcome (compliance and efficacy) from opioid prescribing; 14-21 predicts good outcome (compliance and efficacy)  from opioid prescribing.      Assessment:   1. Bilateral piriformis syndrome  2. Bilateral SI joint dysfunction  3. Lumbar spondylosis, pain is worse with extension and rotation indicating a facetogenic component to pain  4. Back muscle spasms  5. Myofascial pain  6. Chronic pain syndrome  7. PMHx includes: Panic attacks, back pain, arthritis, anxiety, ankylosing spondylitis  8. PSHx includes: Right knee surgery ×2, left  foot surgery right knee arthroscopy      Plan:   1. Recommended to increase activity while pacing himself  2. Physical Therapy:  I agree with left knee physical therapy  3. Clinical Health Psychologist: The patient will schedule a follow up with Padilla Keene  to address issues of relaxation, behavioral change, coping style, and other factors important to improvement.    4. Diagnostic Studies:  none  5. Medication Management:    1. Continue oxycodone 5mg BID PRN  2. Continue Flexeril 5-10mg TID PRN   6. Further procedures recommended: schedule piriformis muscle injections, office to contact patient. The patient will schedule injections 2 weeks after finishing prednisone.   7. If right foot pain remains an issue, the pt will consider seeing a podiatrist.   8. Recommendations to PCP: see above  9. Patient to follow up with Primary Care provider regarding elevated blood pressure.  10. Follow up: 8-10 weeks    Total time spent face to face was 30 minutes and more than 50% of face to face time was spent in counseling and/or coordination of care regarding the diagnosis and recommendations above.    The information in this document, created by the medical scribe for me, accurately reflects the services I personally performed and the decisions made by me. I have reviewed and approved this document for accuracy prior to leaving the patient care area.  August 14, 2018       Susana GRANT, RN CNP, FNP  [facility] [place]

## 2018-08-14 NOTE — TELEPHONE ENCOUNTER
Pt will call back to schedule bilateral piriformis injections after his Prednisone (needs to be 2 wks after).         Deyanira MOLINA    [facility] [place]

## 2018-08-27 ENCOUNTER — MYC MEDICAL ADVICE (OUTPATIENT)
Dept: PALLIATIVE MEDICINE | Facility: CLINIC | Age: 53
End: 2018-08-27

## 2018-08-27 NOTE — TELEPHONE ENCOUNTER
Per patient myChart message:  From: Jailene Breen      Created: 8/27/2018 11:35 AM      Hi, I have an embarrassing question. I'm having a hard time with walking from the back of parking lots and was wondering how I would go about getting a handicap parking tag. I really even hate asking that but it is an issue        Routed to provider.    Cha Molina RN-BSN  [facility]

## 2018-08-27 NOTE — TELEPHONE ENCOUNTER
Hi Les-    Generally, these requests for handicapped parking are handled by your primary care provider. I would talk to them first.     Thanks-  Susana GRANT RN CNP, P  [facility]    I have sent the above Edmodot message to the patient.     Susana GRANT RN CNP, [facility]

## 2018-09-04 ENCOUNTER — MYC REFILL (OUTPATIENT)
Dept: PALLIATIVE MEDICINE | Facility: CLINIC | Age: 53
End: 2018-09-04

## 2018-09-04 ENCOUNTER — MYC MEDICAL ADVICE (OUTPATIENT)
Dept: FAMILY MEDICINE | Facility: CLINIC | Age: 53
End: 2018-09-04

## 2018-09-04 DIAGNOSIS — M62.830 BACK MUSCLE SPASM: ICD-10-CM

## 2018-09-04 DIAGNOSIS — M79.18 MYOFASCIAL PAIN: ICD-10-CM

## 2018-09-04 RX ORDER — CYCLOBENZAPRINE HCL 10 MG
5-10 TABLET ORAL 3 TIMES DAILY PRN
Qty: 60 TABLET | Refills: 2 | Status: CANCELLED | OUTPATIENT
Start: 2018-09-04

## 2018-09-05 ENCOUNTER — MYC REFILL (OUTPATIENT)
Dept: FAMILY MEDICINE | Facility: CLINIC | Age: 53
End: 2018-09-05

## 2018-09-05 DIAGNOSIS — F41.1 GAD (GENERALIZED ANXIETY DISORDER): ICD-10-CM

## 2018-09-05 NOTE — TELEPHONE ENCOUNTER
Househappy message sent to patient. When this Rx was written, 2 refills were authorized. Closing encounter.

## 2018-09-05 NOTE — TELEPHONE ENCOUNTER
Message from [employer]:  Original authorizing provider: JUANITA Ga CNP would like a refill of the following medications:  cyclobenzaprine (FLEXERIL) 10 MG tablet [JUANITA Ga CNP]    Preferred pharmacy: [facility] [facility] [address]    Comment:  Please refill

## 2018-09-05 NOTE — TELEPHONE ENCOUNTER
Message from [employer]:  Original authorizing provider: MD Jamison Miller would like a refill of the following medications:  clonazePAM (KLONOPIN) 1 MG tablet [Heavenly Pan MD]    Preferred pharmacy: [id number] [address]    Comment:  Please refill

## 2018-09-05 NOTE — TELEPHONE ENCOUNTER
Pre-screening Questions for Radiology Injections:    Injection to be done at which interventional clinic site? [facility] [place]    Instruct patient to arrive as directed prior to the scheduled appointment time:    [place] minutes before      [place]: 1 hour before     Procedure ordered by LN    Procedure ordered?  Piriformis Muscle Injection    What insurance would patient like us to bill for this procedure? MEDICA      Worker's comp or MVA (motor vehicle accident) -Any injection DO NOT SCHEDULE and route to Deyanira Bess.      [employer] - For SI joint injections, DO NOT SCHEDULE and route Yasemin Eldridge. MadeiraMadeira NO PA REQUIRED EFFECTIVE 2017      HEALTH [employer]- MBB's must be scheduled at LEAST two weeks apart      [employer] - Any injection besides hip/shoulder/knee joint DO NOT SCHEDULE and route to Yasemin Eldridge. She will obtain PA and call pt back to schedule procedure or notify pt of denial.       HP [employer]-Route to Yasemin for review    Any chance of pregnancy? Not Applicable   If YES, do NOT schedule and route to RN pool    Is an  needed? No     Patient has a drive home? (mandatory) YES:     Is patient taking any blood thinners (plavix, coumadin, jantoven, warfarin, heparin, pradaxa or dabigatran )? No   If hold needed, do NOT schedule, route to RN pool     Is patient taking any aspirin products? No     If more than 325mg/day do NOT schedule; route to RN pool     For CERVICAL procedures, hold all aspirin products for 6 days.      Does the patient have a bleeding or clotting disorder? No     If YES, okay to schedule AND route to RN nurse pool    **For any patients with platelet count <100, must be forwarded to provider**    Is patient diabetic?  No  If YES, have them bring their glucometer.    Does patient have an active infection or treated for one within the past week? No     Is patient currently taking any antibiotics?  No     For patients on  chronic, preventative, or prophylactic antibiotics, procedures may be scheduled.     For patients on antibiotics for active or recent infection:    Tye Kenney Burton, [place]-antibiotic course must have been completed for 4 days    Is patient currently taking any steroid medications? (i.e. Prednisone, Medrol)  No     For patients on steroid medications:    Tye Kenney Burton, [place]-steroid course must have been completed for 4 days    Reviewed with patient:  If you are started on any steroids or antibiotics between now and your appointment, you must contact us because it may affect our ability to perform your procedure.  Yes    Is patient actively being treated for cancer or immunocompromised? No  If YES, do NOT schedule and route to RN pool     Are you able to get on and off an exam table with minimal or no assistance? Yes  If NO, do NOT schedule and route to RN pool    Are you able to roll over and lay on your stomach with minimal or no assistance? Yes  If NO, do NOT schedule and route to RN pool     Any allergies to contrast dye, iodine, shellfish, or numbing and steroid medications? No  If YES, route to RN pool AND add allergy information to appointment notes    Allergies: Hydrocodone and Remeron soltab      Has the patient had a flu shot or any other vaccinations within 7 days before or after the procedure.  No     Does patient have an MRI/CT?  Not Applicable  (SI joint, hip injections, lumbar sympathetic blocks, and stellate ganglion blocks do not require an MRI)    Was the MRI done w/in the last 3 years?  NA    Was MRI done at [facility]? No      If not, where was it done? N/A       If MRI was not done at [facility], [facility] or [facility] do NOT schedule and route to nursing.  If pt has an imaging disc, the injection may be scheduled but pt has to bring disc to appt. If they show up w/out disc the injection cannot be done    Reminders (please tell patient if applicable):        Instructed pt to arrive 30 minutes early for IV start if this is for a cervical procedure, ALL sympathetic (stellate ganglion, hypogastric, or lumbar sympathetic block) and all sedation procedures (RFA, spinal cord stimulation trials).  Not Applicable   -IVs are not routinely placed for Dr. Chen cervical cases   -Dr. Holley: IVs for cervical ESIs and cervical TBDs (not CMBBs/facet inj)      If NPO for sedation, informed patient that it is okay to take medications with sips of water (except if they are to hold blood thinners).  Not Applicable   *DO take blood pressure medication if it is prescribed*      If this is for a cervical MAKAYLA, informed patient that aspirin needs to be held for 6 days.   Not Applicable      For all patients not having spinal cord stimulator (SCS) trials or radiofrequency ablations (RFAs), informed patient:    IV sedation is not provided for this procedure.  If you feel that an oral anti-anxiety medication is needed, you can discuss this further with your referring provider or primary care provider.  The Pain Clinic provider will discuss specifics of what the procedure includes at your appointment.  Most procedures last 10-20 minutes.  We use numbing medications to help with any discomfort during the procedure.  Not Applicable      Do not schedule procedures requiring IV placement in the first appointment of the day or first appointment after lunch. Do NOT schedule at 0745, 0815 or 1245.       For patients 85 or older we recommend having an adult stay w/ them for the remainder of the day.       Does the patient have any questions?    Yasemin Eldridge  [facility]

## 2018-09-06 RX ORDER — CLONAZEPAM 1 MG/1
0.5-1 TABLET ORAL 2 TIMES DAILY PRN
Qty: 10 TABLET | Refills: 0 | OUTPATIENT
Start: 2018-09-06

## 2018-09-06 NOTE — TELEPHONE ENCOUNTER
Requested Prescriptions   Pending Prescriptions Disp Refills     clonazePAM (KLONOPIN) 1 MG tablet        Last Written Prescription Date:  08/14/18  Last Fill Quantity: 60,   # refills: 3  Last Office Visit: 02/20/18-Lizzy  Future Office visit:    Next 5 appointments (look out 90 days)     Oct 26, 2018  9:00 AM CDT   Return Visit with JUANITA Ga CNP   [facility] [place] ([facility])    [address]  [place] [id number] [phone]                   Routing refill request to provider for review/approval because:  Drug not on the [facility], P or  Health refill protocol or controlled substance 10 tablet 0     Sig: Take 0.5-1 tablets (0.5-1 mg) by mouth 2 times daily as needed for anxiety    There is no refill protocol information for this order

## 2018-09-07 ENCOUNTER — MYC MEDICAL ADVICE (OUTPATIENT)
Dept: PALLIATIVE MEDICINE | Facility: CLINIC | Age: 53
End: 2018-09-07

## 2018-09-07 DIAGNOSIS — G89.29 CHRONIC PAIN OF LEFT KNEE: ICD-10-CM

## 2018-09-07 DIAGNOSIS — M54.41 CHRONIC BILATERAL LOW BACK PAIN WITH RIGHT-SIDED SCIATICA: ICD-10-CM

## 2018-09-07 DIAGNOSIS — G89.29 CHRONIC BILATERAL LOW BACK PAIN WITH RIGHT-SIDED SCIATICA: ICD-10-CM

## 2018-09-07 DIAGNOSIS — M25.562 CHRONIC PAIN OF LEFT KNEE: ICD-10-CM

## 2018-09-07 RX ORDER — OXYCODONE HYDROCHLORIDE 5 MG/1
TABLET ORAL
Qty: 45 TABLET | Refills: 0 | Status: SHIPPED | OUTPATIENT
Start: 2018-09-07 | End: 2018-10-08

## 2018-09-07 NOTE — TELEPHONE ENCOUNTER
Routed to the nursing pool and to the MA pool to process refill(s).    Cha Molina, RN-BSN  [facility]

## 2018-09-07 NOTE — TELEPHONE ENCOUNTER
Medication refill information reviewed.     Due date for oxyCODONE IR (ROXICODONE) 5 MG tablet is 8/12/18    Prescriptions prepped for review.     Will route to provider.       Josias Lopez, RN  Care Coordinator   [facility] [place]

## 2018-09-07 NOTE — TELEPHONE ENCOUNTER
Received call from patient requesting refill(s) of oxyCODONE IR (ROXICODONE) 5 MG tablet    Last picked up from pharmacy on 08/12/18    Pt last seen by prescribing provider on 08/14/18  Next appt scheduled for 10/26/18 - has injection appt at earlier date     checked in the past 6 months? Yes If no, print current report and give to RN    Last urine drug screen date 04/11/18  Current opioid agreement on file (completed within the last year) Yes Date of opioid agreement: 04/11/18    Processing (pick one and delete the others):        Pt will  in clinic at [place] location      Will route to nursing pool for review and preparation of prescription(s).

## 2018-09-10 NOTE — TELEPHONE ENCOUNTER
Provider refused noting that the patient needs to be seen. Call and schedule patient.    Ida Hirsch RN, [facility] Triage

## 2018-09-14 ENCOUNTER — OFFICE VISIT (OUTPATIENT)
Dept: FAMILY MEDICINE | Facility: CLINIC | Age: 53
End: 2018-09-14
Payer: COMMERCIAL

## 2018-09-14 VITALS
BODY MASS INDEX: 32.47 KG/M2 | HEIGHT: 76 IN | TEMPERATURE: 98.6 F | HEART RATE: 98 BPM | RESPIRATION RATE: 18 BRPM | SYSTOLIC BLOOD PRESSURE: 130 MMHG | WEIGHT: 266.6 LBS | OXYGEN SATURATION: 98 % | DIASTOLIC BLOOD PRESSURE: 89 MMHG

## 2018-09-14 DIAGNOSIS — F41.1 GAD (GENERALIZED ANXIETY DISORDER): Primary | ICD-10-CM

## 2018-09-14 DIAGNOSIS — Z12.11 SCREEN FOR COLON CANCER: ICD-10-CM

## 2018-09-14 DIAGNOSIS — R06.02 SOB (SHORTNESS OF BREATH): ICD-10-CM

## 2018-09-14 DIAGNOSIS — Z11.4 SCREENING FOR HIV (HUMAN IMMUNODEFICIENCY VIRUS): ICD-10-CM

## 2018-09-14 PROCEDURE — 99214 OFFICE O/P EST MOD 30 MIN: CPT | Performed by: PHYSICIAN ASSISTANT

## 2018-09-14 RX ORDER — CLONAZEPAM 1 MG/1
0.5-1 TABLET ORAL 2 TIMES DAILY PRN
Qty: 30 TABLET | Refills: 1 | Status: SHIPPED | OUTPATIENT
Start: 2018-09-14 | End: 2018-12-10

## 2018-09-14 RX ORDER — ALBUTEROL SULFATE 90 UG/1
2 AEROSOL, METERED RESPIRATORY (INHALATION) EVERY 6 HOURS PRN
Qty: 1 INHALER | Refills: 3 | Status: SHIPPED | OUTPATIENT
Start: 2018-09-14 | End: 2020-04-27

## 2018-09-14 ASSESSMENT — ANXIETY QUESTIONNAIRES
IF YOU CHECKED OFF ANY PROBLEMS ON THIS QUESTIONNAIRE, HOW DIFFICULT HAVE THESE PROBLEMS MADE IT FOR YOU TO DO YOUR WORK, TAKE CARE OF THINGS AT HOME, OR GET ALONG WITH OTHER PEOPLE: SOMEWHAT DIFFICULT
2. NOT BEING ABLE TO STOP OR CONTROL WORRYING: MORE THAN HALF THE DAYS
1. FEELING NERVOUS, ANXIOUS, OR ON EDGE: MORE THAN HALF THE DAYS
GAD7 TOTAL SCORE: 12
3. WORRYING TOO MUCH ABOUT DIFFERENT THINGS: MORE THAN HALF THE DAYS
5. BEING SO RESTLESS THAT IT IS HARD TO SIT STILL: MORE THAN HALF THE DAYS
6. BECOMING EASILY ANNOYED OR IRRITABLE: SEVERAL DAYS
7. FEELING AFRAID AS IF SOMETHING AWFUL MIGHT HAPPEN: SEVERAL DAYS

## 2018-09-14 ASSESSMENT — PAIN SCALES - GENERAL: PAINLEVEL: NO PAIN (0)

## 2018-09-14 ASSESSMENT — PATIENT HEALTH QUESTIONNAIRE - PHQ9: 5. POOR APPETITE OR OVEREATING: MORE THAN HALF THE DAYS

## 2018-09-14 NOTE — MR AVS SNAPSHOT
After Visit Summary   9/14/2018    Jailene Breen    MRN: [id number]           Patient Information     Date Of Birth          1965        Visit Information        Provider Department      9/14/2018 10:00 AM Nanette Haywood PA-C [facility]        Today's Diagnoses     SOB (shortness of breath)    -  1    Screen for colon cancer        Screening for HIV (human immunodeficiency virus)        JEFF (generalized anxiety disorder)          Care Instructions    Start Sertraline 50 mg daily in the morning  Clonopin for panic attack  Follow up in 6 weeks    Use Albuterol 2 puffs every 6 hours as needed for shortness of breath     Please call  Radiology at [phone] to schedule your appointment for stress test    Make appointment with pulmonology            Follow-ups after your visit        Additional Services     PULMONARY MEDICINE REFERRAL       Your provider has referred you to: [facility]: [facility] (Adults & Pediatrics) - [place] [phone]   [email or web address]    Please be aware that coverage of these services is subject to the terms and limitations of your health insurance plan.  Call member services at your health plan with any benefit or coverage questions.      Please bring the following with you to your appointment:    (1) Any X-Rays, CTs or MRIs which have been performed.  Contact the facility where they were done to arrange for  prior to your scheduled appointment.    (2) List of current medications   (3) This referral request   (4) Any documents/labs given to you for this referral                  Your next 10 appointments already scheduled     Sep 20, 2018  7:45 AM CDT   Radiology Injections with Silvia Gamble MD   [facility] [place] ([facility] [place])    [address]  [place] [address]   [phone]            Oct 26, 2018   "9:00 AM CDT   Return Visit with JUANITA Ga CNP   [facility] [place] ([facility] [place])    [address]  [place] [address] [phone]              Future tests that were ordered for you today     Open Future Orders        Priority Expected Expires Ordered    Exercise Stress Echocardiogram Routine  9/14/2019 9/14/2018            Who to contact     If you have questions or need follow up information about today's clinic visit or your schedule please contact [facility] directly at [phone].  Normal or non-critical lab and imaging results will be communicated to you by Feedskyhart, letter or phone within 4 business days after the clinic has received the results. If you do not hear from us within 7 days, please contact the clinic through Feedsky[employer] or phone. If you have a critical or abnormal lab result, we will notify you by phone as soon as possible.  Submit refill requests through [employer] or call your pharmacy and they will forward the refill request to us. Please allow 3 business days for your refill to be completed.          Additional Information About Your Visit        MyChart Information     [employer] gives you secure access to your electronic health record. If you see a primary care provider, you can also send messages to your care team and make appointments. If you have questions, please call your primary care clinic.  If you do not have a primary care provider, please call [phone] and they will assist you.        Care EveryWhere ID     This is your Care EveryWhere ID. This could be used by other organizations to access your [facility] records  [id number]        Your Vitals Were     Pulse Temperature Respirations Height Pulse Oximetry BMI (Body Mass Index)    98 98.6  F (37  C) (Oral) 18 6' 4\" (1.93 m) 98% 32.45 kg/m2       Blood Pressure from Last 3 Encounters:   09/14/18 130/89   08/14/18 (!) 142/95   07/12/18 (!) 140/100    " Weight from Last 3 Encounters:   09/14/18 266 lb 9.6 oz (120.9 kg)   08/14/18 265 lb (120.2 kg)   04/11/18 263 lb (119.3 kg)              We Performed the Following     PULMONARY MEDICINE REFERRAL          Today's Medication Changes          These changes are accurate as of 9/14/18 10:39 AM.  If you have any questions, ask your nurse or doctor.               Start taking these medicines.        Dose/Directions    albuterol 108 (90 Base) MCG/ACT inhaler   Commonly known as:  PROAIR HFA/PROVENTIL HFA/VENTOLIN HFA   Used for:  SOB (shortness of breath)   Started by:  Nanette Haywood PA-C        Dose:  2 puff   Inhale 2 puffs into the lungs every 6 hours as needed for shortness of breath / dyspnea   Quantity:  1 Inhaler   Refills:  3       sertraline 50 MG tablet   Commonly known as:  ZOLOFT   Used for:  JEFF (generalized anxiety disorder)   Started by:  Nanette Haywood PA-C        Dose:  50 mg   Take 1 tablet (50 mg) by mouth daily   Quantity:  90 tablet   Refills:  1            Where to get your medicines      These medications were sent to [facility] [place] - [place], [place] - [address]  [address], [facility] [address]     Phone:  [phone]     albuterol 108 (90 Base) MCG/ACT inhaler    sertraline 50 MG tablet         Some of these will need a paper prescription and others can be bought over the counter.  Ask your nurse if you have questions.     Bring a paper prescription for each of these medications     clonazePAM 1 MG tablet                Primary Care Provider Office Phone # Fax #    JASON Rice [phone]       [address] [facility] [address]        Equal Access to Services     SARATH ESPINAL AH: John Myrick, wafranda luqadaha, qaybta kaalmada adeegyada, stephanie paredes. So [facility] [phone].    ATENCIÓN: Si habla español, tiene a carrington disposición servicios gratuitos de  asistencia lingüística. Nathaniel al [phone].    We comply with applicable federal civil rights laws and [place] laws. We do not discriminate on the basis of race, color, national origin, age, disability, sex, sexual orientation, or gender identity.            Thank you!     Thank you for choosing [facility]  for your care. Our goal is always to provide you with excellent care. Hearing back from our patients is one way we can continue to improve our services. Please take a few minutes to complete the written survey that you may receive in the mail after your visit with us. Thank you!             Your Updated Medication List - Protect others around you: Learn how to safely use, store and throw away your medicines at www.disposemymeds.org.          This list is accurate as of 9/14/18 10:39 AM.  Always use your most recent med list.                   Brand Name Dispense Instructions for use Diagnosis    albuterol 108 (90 Base) MCG/ACT inhaler    PROAIR HFA/PROVENTIL HFA/VENTOLIN HFA    1 Inhaler    Inhale 2 puffs into the lungs every 6 hours as needed for shortness of breath / dyspnea    SOB (shortness of breath)       buPROPion 150 MG 12 hr tablet    WELLBUTRIN SR    180 tablet    Take 1 tablet (150 mg) by mouth 2 times daily    Anxiety, Tobacco abuse       clonazePAM 1 MG tablet    klonoPIN    30 tablet    Take 0.5-1 tablets (0.5-1 mg) by mouth 2 times daily as needed for anxiety    JEFF (generalized anxiety disorder)       cyclobenzaprine 10 MG tablet    FLEXERIL    60 tablet    Take 0.5-1 tablets (5-10 mg) by mouth 3 times daily as needed for muscle spasms Caution sedation    Back muscle spasm, Myofascial pain       folic acid 1 MG tablet    FOLVITE     Reported on 5/19/2017        guaiFENesin-codeine 100-10 MG/5ML Soln solution    ROBITUSSIN AC    120 mL    Take 5 mLs by mouth every 4 hours as needed for cough    Cough       HUMIRA PEN 40 MG/0.8ML pen kit   Generic drug:  adalimumab            IBUPROFEN PO      Take 800 mg by mouth every 6 hours as needed for moderate pain Reported on 5/19/2017        ketoconazole 2 % shampoo    NIZORAL    120 mL    Apply to the affected area and wash off after 5 minutes.use until skin condition is better    Seborrheic dermatitis       methotrexate 2.5 MG tablet CHEMO      6 tablets Reported on 4/3/2017        * nicotine polacrilex 2 MG gum    EQ NICOTINE POLACRILEX    170 tablet    Place 1 each (2 mg) inside cheek every 4 hours as needed for smoking cessation    Tobacco use disorder       * nicotine polacrilex 2 MG lozenge    [employer] NICOTINE POLACRILEX    360 lozenge    Place 1 lozenge (2 mg) inside cheek every hour as needed for smoking cessation    Tobacco use disorder       nortriptyline 50 MG capsule    PAMELOR    90 capsule    Take 1 capsule (50 mg) by mouth At Bedtime    Anxiety, Other chronic pain       oxyCODONE IR 5 MG tablet    ROXICODONE    45 tablet    Take 1 tablet every 6-8 hours as needed for pain, max of 2 tabs per day but cannot take 2 tabs every day.  Okay to fill on 9/10/2018 and start on 9/11/2018. To last 30 days. No further early refills    Chronic pain of left knee, Chronic bilateral low back pain with right-sided sciatica       predniSONE 20 MG tablet    DELTASONE    5 tablet    Take 1 tablet (20 mg) by mouth daily    Acute bronchitis, unspecified organism, Acute recurrent maxillary sinusitis       sertraline 50 MG tablet    ZOLOFT    90 tablet    Take 1 tablet (50 mg) by mouth daily    JEFF (generalized anxiety disorder)       * Notice:  This list has 2 medication(s) that are the same as other medications prescribed for you. Read the directions carefully, and ask your doctor or other care provider to review them with you.

## 2018-09-14 NOTE — PROGRESS NOTES
SUBJECTIVE:   Jailene Breen is a 52 year old male who presents to clinic today for the following health issues:    Anxiety Follow-Up    Status since last visit: Worsened    Other associated symptoms: panic attack and shortness of breath    Complicating factors:   Significant life event: haven't been able to work and back at college    Current substance abuse: None  Depression symptoms: Yes  JEFF-7 SCORE 12/19/2016 7/28/2017 9/14/2018   Total Score - - -   Total Score - - -   Total Score 10 8 12       JEFF-7    Amount of exercise or physical activity: 4-5 days/week for an average of 30-45 minutes    Problems taking medications regularly: No    Medication side effects: none    Diet: regular (no restrictions)    ED/UC Followup:    Facility:  [facility]   Date of visit: 08/30/2018 and 08/15/2018  Reason for visit: shortness of breath   Current Status: same          Shortness of breath       Duration: 1 year    Description (location/character/radiation): shortness of breath at rest and worse on exertion    Intensity:  moderate    Accompanying signs and symptoms: patient feels that he can't expand the lung enough to inhale    History (similar episodes/previous evaluation): patient has chest CT and pulmonary function test that were normal and could not explain the cause of shortness of breath.    Precipitating or alleviating factors: None    Therapies tried and outcome: None     Patient denies chest pain, lightheadeness or dizziness.   Problem list and histories reviewed & adjusted, as indicated.  Additional history: as documented    Patient Active Problem List   Diagnosis     CARDIOVASCULAR SCREENING; LDL GOAL LESS THAN 160     Anxiety     Overweight (BMI 25.0-29.9)     Back pain     Tear meniscus knee, right, initial encounter     Rheumatoid arthritis involving multiple sites, unspecified rheumatoid factor presence (H)     Chronic pain     Right-sided thoracic back pain, unspecified chronicity     JEFF (generalized anxiety  disorder)     Panic attack     Syncope, unspecified syncope type     Ankylosing spondylitis (H)     Tobacco use disorder     Past Surgical History:   Procedure Laterality Date     ARTHROSCOPY KNEE Right 9/22/2016    Procedure: ARTHROSCOPY KNEE;  Surgeon: Fredo Hughes MD;  Location: [facility] OR     INJECT PARAVERTEBRAL FACET JOINT LUMBAR / SACRAL FIRST Right 11/25/2016    Procedure: INJECT PARAVERTEBRAL FACET JOINT LUMBAR / SACRAL FIRST;  Surgeon: Doretha Magallanes MD;  Location: [facility] OR     ORTHOPEDIC SURGERY      Rt knee X 2     ORTHOPEDIC SURGERY      Lt foot       Social History   Substance Use Topics     Smoking status: Never Smoker     Smokeless tobacco: Current User     Types: Chew      Comment: Chew     Alcohol use No      Comment: none     History reviewed. No pertinent family history.      Current Outpatient Prescriptions   Medication Sig Dispense Refill     albuterol (PROAIR HFA/PROVENTIL HFA/VENTOLIN HFA) 108 (90 Base) MCG/ACT inhaler Inhale 2 puffs into the lungs every 6 hours as needed for shortness of breath / dyspnea 1 Inhaler 3     clonazePAM (KLONOPIN) 1 MG tablet Take 0.5-1 tablets (0.5-1 mg) by mouth 2 times daily as needed for anxiety 30 tablet 1     cyclobenzaprine (FLEXERIL) 10 MG tablet Take 0.5-1 tablets (5-10 mg) by mouth 3 times daily as needed for muscle spasms Caution sedation 60 tablet 2     folic acid (FOLVITE) 1 MG tablet Reported on 5/19/2017       HUMIRA PEN 40 MG/0.8ML pen kit        IBUPROFEN PO Take 800 mg by mouth every 6 hours as needed for moderate pain Reported on 5/19/2017       methotrexate 2.5 MG tablet 6 tablets Reported on 4/3/2017       nicotine polacrilex ([employer] NICOTINE POLACRILEX) 2 MG lozenge Place 1 lozenge (2 mg) inside cheek every hour as needed for smoking cessation 360 lozenge 1     nicotine polacrilex (EQ NICOTINE POLACRILEX) 2 MG gum Place 1 each (2 mg) inside cheek every 4 hours as needed for smoking cessation 170 tablet 5     nortriptyline (PAMELOR) 50  "MG capsule Take 1 capsule (50 mg) by mouth At Bedtime 90 capsule 1     oxyCODONE IR (ROXICODONE) 5 MG tablet Take 1 tablet every 6-8 hours as needed for pain, max of 2 tabs per day but cannot take 2 tabs every day.  Okay to fill on 9/10/2018 and start on 9/11/2018. To last 30 days. No further early refills 45 tablet 0     predniSONE (DELTASONE) 20 MG tablet Take 1 tablet (20 mg) by mouth daily 5 tablet 0     sertraline (ZOLOFT) 50 MG tablet Take 1 tablet (50 mg) by mouth daily 90 tablet 1     buPROPion (WELLBUTRIN SR) 150 MG 12 hr tablet Take 1 tablet (150 mg) by mouth 2 times daily (Patient not taking: Reported on 3/15/2018) 180 tablet 1     guaiFENesin-codeine (ROBITUSSIN AC) 100-10 MG/5ML SOLN solution Take 5 mLs by mouth every 4 hours as needed for cough (Patient not taking: Reported on 3/15/2018) 120 mL 0     ketoconazole (NIZORAL) 2 % shampoo Apply to the affected area and wash off after 5 minutes.use until skin condition is better (Patient not taking: Reported on 3/15/2018) 120 mL 1     [DISCONTINUED] clonazePAM (KLONOPIN) 1 MG tablet Take 0.5-1 tablets (0.5-1 mg) by mouth 2 times daily as needed for anxiety 10 tablet 0     Allergies   Allergen Reactions     Hydrocodone Itching     Remeron Soltab Other (See Comments)     \"Blacked out\" for one week       Reviewed and updated as needed this visit by clinical staff  Tobacco  Allergies  Meds  Problems  Med Hx  Surg Hx  Fam Hx  Soc Hx        Reviewed and updated as needed this visit by Provider  Tobacco  Allergies  Meds  Problems         ROS:  Constitutional, HEENT, cardiovascular, pulmonary, GI, , musculoskeletal, neuro, skin, endocrine and psych systems are negative, except as otherwise noted.    OBJECTIVE:     /89  Pulse 98  Temp 98.6  F (37  C) (Oral)  Resp 18  Ht 6' 4\" (1.93 m)  Wt 266 lb 9.6 oz (120.9 kg)  SpO2 98%  BMI 32.45 kg/m2  Body mass index is 32.45 kg/(m^2).  GENERAL: healthy, alert and mild distress due to mental " state  EYES: Eyes grossly normal to inspection, PERRL and conjunctivae and sclerae normal  HENT: ear canals and TM's normal, nose and mouth without ulcers or lesions  NECK: no adenopathy, no asymmetry, masses, or scars and thyroid normal to palpation  RESP: lungs clear to auscultation - no rales, rhonchi or wheezes  CV: regular rate and rhythm, normal S1 S2, no S3 or S4, no murmur, click or rub, no peripheral edema and peripheral pulses strong  ABDOMEN: soft, nontender, no hepatosplenomegaly, no masses and bowel sounds normal  MS: no gross musculoskeletal defects noted, no edema  PSYCH: mentation appears normal, affect normal/bright, tearful and anxious    Diagnostic Test Results:  none     ASSESSMENT/PLAN:       ICD-10-CM    1. JEFF (generalized anxiety disorder) F41.1 clonazePAM (KLONOPIN) 1 MG tablet     sertraline (ZOLOFT) 50 MG tablet   2. SOB (shortness of breath) R06.02 PULMONARY MEDICINE REFERRAL     Exercise Stress Echocardiogram     albuterol (PROAIR HFA/PROVENTIL HFA/VENTOLIN HFA) 108 (90 Base) MCG/ACT inhaler   3. Screen for colon cancer Z12.11    4. Screening for HIV (human immunodeficiency virus) Z11.4      1.Start Sertraline 50 mg daily in the morning  Clonopin for panic attack  Follow up in 6 weeks    2. I believe that patient's symptom are from severe anxiety. However, to rule out cardiac origin patient will have stress echo.   Use Albuterol 2 puffs every 6 hours as needed for shortness of breath     Please call  Radiology at [phone] to schedule your appointment for stress test    Make appointment with pulmonology      Nanette Haywood PA-C  [facility]

## 2018-09-14 NOTE — PATIENT INSTRUCTIONS
Start Sertraline 50 mg daily in the morning  Clonopin for panic attack  Follow up in 6 weeks    Use Albuterol 2 puffs every 6 hours as needed for shortness of breath     Please call  Radiology at [phone] to schedule your appointment for stress test    Make appointment with pulmonology

## 2018-09-15 ASSESSMENT — ANXIETY QUESTIONNAIRES: GAD7 TOTAL SCORE: 12

## 2018-09-15 ASSESSMENT — PATIENT HEALTH QUESTIONNAIRE - PHQ9: SUM OF ALL RESPONSES TO PHQ QUESTIONS 1-9: 13

## 2018-09-20 ENCOUNTER — RADIOLOGY INJECTION OFFICE VISIT (OUTPATIENT)
Dept: PALLIATIVE MEDICINE | Facility: CLINIC | Age: 53
End: 2018-09-20
Payer: COMMERCIAL

## 2018-09-20 ENCOUNTER — RADIANT APPOINTMENT (OUTPATIENT)
Dept: RADIOLOGY | Facility: CLINIC | Age: 53
End: 2018-09-20
Attending: PSYCHIATRY & NEUROLOGY
Payer: COMMERCIAL

## 2018-09-20 VITALS — SYSTOLIC BLOOD PRESSURE: 143 MMHG | DIASTOLIC BLOOD PRESSURE: 97 MMHG | OXYGEN SATURATION: 96 % | HEART RATE: 88 BPM

## 2018-09-20 DIAGNOSIS — M79.18 MYOFACIAL MUSCLE PAIN: ICD-10-CM

## 2018-09-20 DIAGNOSIS — M79.18 MYOFASCIAL PAIN: Primary | ICD-10-CM

## 2018-09-20 PROCEDURE — 20552 NJX 1/MLT TRIGGER POINT 1/2: CPT | Performed by: PSYCHIATRY & NEUROLOGY

## 2018-09-20 ASSESSMENT — PAIN SCALES - GENERAL: PAINLEVEL: WORST PAIN (10)

## 2018-09-20 NOTE — PROGRESS NOTES
Pre procedure Diagnosis: myofascial pain, piriformis syndrome  Post procedure Diagnosis: Same  Procedure performed: left piriformis injection, bilateral gluteal trigger point  Anesthesia: none  Complications: none  Operators: Ashlyn Gamble MD. Eugene Sandhu DO (Pain Medicine Fellow).    Indications:   Jailene Breen is a 52 year old male was sent by Susana Pike NP for piriformis injection.  They have a history of right more than left buttock pain worse with both sitting and standing.  Exam shows tenderness at the PSIS and along the piriformis and they have tried conservative treatment including PT and medications.    Options/alternatives, benefits and risks were discussed with the patient including bleeding, infection, tissue trauma, exposure to radiation, reaction to medications including seizure, nerve injury, weakness, and numbness. Questions were answered to his satisfaction and he agrees to proceed. Voluntary informed consent was obtained and signed.     Vitals were reviewed: Yes  Allergies were reviewed:  Yes   Medications were reviewed:  Yes   Pre-procedure pain score: 10/10    Procedure:  After getting informed consent, patient was brought into the procedure suite and was placed in a prone position on the procedure table.   A Pause for the Cause was performed.  Patient was prepped and draped in sterile fashion.     A fluoroscopic view including the SI joint and the superiolateral border of the right and left acetabulum was obtained.  A location 1/3 of the distance medial from the acetabulum to the SI joint, overlying the ileum, was marked.  2 ml of Lidocaine 1% was used to anesthetize the skin.  A 2 inch Stimiplex needle was advanced towards the marked location.  Once within the musculature, the Stimiplex was turned on at 2 Hz, to a output of 1.0mAmps.  Ultimately, the needle was taken to ilium and pulled back 2 mm. There were no paresthesias or contractions noted in the calf or foot.      At this point,  1 mL of Omnipaque was injected, with a flow consistent with piriformis muscle spread. 9ml was wasted.      A solution containing 4ml of 0.2% ropivacaine and 40mg of kenalog was injected, divided equally between the two sides The needle was removed.      At each PSIS, a trigger point was done in the gluteal musculature bilaterally with lidocaine 1%.  ~2ml was placed on each side.    Hemostasis was achieved, the area was cleaned, and bandaids were placed when appropriate.  The patient tolerated the procedure well, and was taken to the recovery room.    Images were saved to PACS.    Post-procedure pain score: 6/10  Follow-up includes:   -f/u phone call in one week  -f/u with referring provider    Ashlyn Gamble MD  [facility]

## 2018-09-20 NOTE — NURSING NOTE
Discharge Information    IV Discontiued Time:  NA    Amount of Fluid Infused:  NA    Discharge Criteria = When patient returns to baseline or as per MD order    Consciousness:  Pt is fully awake    Circulation:  BP +/- 20% of pre-procedure level    Respiration:  Patient is able to breathe deeply    O2 Sat:  Patient is able to maintain O2 Sat >92% on room air    Activity:  Moves 4 extremities on command    Ambulation:  Patient is able to stand and walk or stand and pivot into wheelchair    Dressing:  Clean/dry or No Dressing    Notes:   Discharge instructions and AVS given to patient    Patient meets criteria for discharge?  YES    Admitted to PCU?  No    Responsible adult present to accompany patient home?  Yes    Signature/Title:    dariel kennedy RN Care Coordinator  [facility]

## 2018-09-20 NOTE — PATIENT INSTRUCTIONS
[facility]   Procedure Discharge Instructions    Today you saw: Dr. Silvia Gamble     You had an:   piriformis injection     Medications used:  LidocaineOmnipaque  Ropivicaine   Kenalog             Be cautious when walking. Numbness and/or weakness in the lower extremities may occur for up to 6-8 hours after the procedure due to effect of the local anesthetic    Do not drive for 6 hours. The effect of the local anesthetic could slow your reflexes.     You may resume your regular activities after 24 hours    Avoid strenuous activity for the first 24 hours    You may shower, however avoid swimming, tub baths or hot tubs for 24 hours following your procedure    You may have a mild to moderate increase in pain for several days following the injection.    It may take up to 14 days for the steroid medication to start working although you may feel the effect as early as a few days after the procedure.       You may use ice packs for 10-15 minutes, 3 to 4 times a day at the injection site for comfort    Do not use heat to painful areas for 6 to 8 hours. This will give the local anesthetic time to wear off and prevent you from accidentally burning your skin.     You may use anti-inflammatory medications (such as Ibuprofen or Aleve or Advil) or Tylenol for pain control if necessary    If you were fasting, you may resume your normal diet and medications after the procedure    If you have diabetes, check your blood sugar more frequently than usual as your blood sugar may be higher than normal for 10-14 days following a steroid injection. Contact your doctor who manages your diabetes if your blood sugar is higher than usual    If you experience any of the following, call the Pain Clinic during work hours at [phone] or the Provider Line after hours at [phone]:  -Fever over 100 degree F  -Swelling, bleeding, redness, drainage, warmth at the injection site  -Progressive weakness or numbness in your  legs or arms  -Loss of bowel or bladder function  -Unusual headache that is not relieved by Tylenol or other pain reliever  -Unusual new onset of pain that is not improving

## 2018-09-20 NOTE — MR AVS SNAPSHOT
After Visit Summary   9/20/2018    Jailene Breen    MRN: [id number]           Patient Information     Date Of Birth          1965        Visit Information        Provider Department      9/20/2018 7:45 AM Silvia Gamble MD [facility]        Care Instructions    [facility]   Procedure Discharge Instructions    Today you saw: Dr. Silvia Gamble     You had an:   piriformis injection     Medications used:  LidocaineOmnipaque  Ropivicaine   Kenalog             Be cautious when walking. Numbness and/or weakness in the lower extremities may occur for up to 6-8 hours after the procedure due to effect of the local anesthetic    Do not drive for 6 hours. The effect of the local anesthetic could slow your reflexes.     You may resume your regular activities after 24 hours    Avoid strenuous activity for the first 24 hours    You may shower, however avoid swimming, tub baths or hot tubs for 24 hours following your procedure    You may have a mild to moderate increase in pain for several days following the injection.    It may take up to 14 days for the steroid medication to start working although you may feel the effect as early as a few days after the procedure.       You may use ice packs for 10-15 minutes, 3 to 4 times a day at the injection site for comfort    Do not use heat to painful areas for 6 to 8 hours. This will give the local anesthetic time to wear off and prevent you from accidentally burning your skin.     You may use anti-inflammatory medications (such as Ibuprofen or Aleve or Advil) or Tylenol for pain control if necessary    If you were fasting, you may resume your normal diet and medications after the procedure    If you have diabetes, check your blood sugar more frequently than usual as your blood sugar may be higher than normal for 10-14 days following a steroid injection. Contact your doctor who manages your diabetes if your blood sugar is  higher than usual    If you experience any of the following, call the Pain Clinic during work hours at [phone] or the Provider Line after hours at [phone]:  -Fever over 100 degree F  -Swelling, bleeding, redness, drainage, warmth at the injection site  -Progressive weakness or numbness in your legs or arms  -Loss of bowel or bladder function  -Unusual headache that is not relieved by Tylenol or other pain reliever  -Unusual new onset of pain that is not improving                Follow-ups after your visit        Your next 10 appointments already scheduled     Sep 27, 2018 10:30 AM CDT   Ech Stress Test with [id number], MG CARD, MG STRESS RM, MG ECHO TECH, MG CV TECH   [facility]    [id number] [place] [id number] [phone]           1. Please bring or wear a comfortable two-piece outfit and walking shoes. 2. Stop eating 3 hours before the test. You may drink water or juice. 3. Stop all caffeine 12 hours before the test. This includes coffee, tea, soda pop, chocolate and certain medicines (such as Anacin and Excederin). Also avoid decaf coffee and tea, as these contain small amounts of caffeine. 4. No alcohol, smoking or use of other tobacco products for 12 hours before the test. 5. Refer to your provider instructions to see if you need to stop any medications (such as beta-blockers or nitrates) for this test. 6. For patients with diabetes: - If you take insulin, call your diabetes care team. Ask if you should take a   dose the morning of your test. - If you take diabetes medicine by mouth, dont take it on the morning of your test. Bring it with you to take after the test. (If you have questions, call your diabetes care team) 7. When you arrive, please tell us if: - You have diabetes. - You have taken Viagra, Cialis or Levitra in the past 48 hours. 8. For any questions that cannot be answered, please contact the ordering physician 9. Please  do not wear perfumes or scented lotions on the day of your exam.            Oct 26, 2018  9:00 AM CDT   Return Visit with JUANITA Ga CNP   [facility] [place] ([facility] [place])    [address] [facility]  [place] [id number] [phone]              Who to contact     If you have questions or need follow up information about today's clinic visit or your schedule please contact [facility] [place] directly at [phone].  Normal or non-critical lab and imaging results will be communicated to you by [employer], letter or phone within 4 business days after the clinic has received the results. If you do not hear from us within 7 days, please contact the clinic through [employer] or phone. If you have a critical or abnormal lab result, we will notify you by phone as soon as possible.  Submit refill requests through [employer] or call your pharmacy and they will forward the refill request to us. Please allow 3 business days for your refill to be completed.          Additional Information About Your Visit        [employer] Information     [employer] gives you secure access to your electronic health record. If you see a primary care provider, you can also send messages to your care team and make appointments. If you have questions, please call your primary care clinic.  If you do not have a primary care provider, please call [phone] and they will assist you.        Care EveryWhere ID     This is your Care EveryWhere ID. This could be used by other organizations to access your [place] medical records  [id number]        Your Vitals Were     Pulse                   73            Blood Pressure from Last 3 Encounters:   09/20/18 128/86   09/14/18 130/89   08/14/18 (!) 142/95    Weight from Last 3 Encounters:   09/14/18 120.9 kg (266 lb 9.6 oz)   08/14/18 120.2 kg (265 lb)   04/11/18 119.3 kg (263 lb)              Today, you had the following     No orders found for display       Primary  Care Provider Office Phone # Fax #    Aiden JASON Meyer [phone] [address] [facility] [address]        Equal Access to Services     SARATH ESPINAL : Hadtwyla kiran pearson deriko Sosarah, waaxda luqadaha, qaybta kaalmada adeazaliada, stephanie murray laDilipclint paredes. So [facility] [phone].    ATENCIÓN: Si habla español, tiene a carrington disposición servicios gratuitos de asistencia lingüística. Llame al [phone].    We comply with applicable federal civil rights laws and [place] laws. We do not discriminate on the basis of race, color, national origin, age, disability, sex, sexual orientation, or gender identity.            Thank you!     Thank you for choosing [facility]  for your care. Our goal is always to provide you with excellent care. Hearing back from our patients is one way we can continue to improve our services. Please take a few minutes to complete the written survey that you may receive in the mail after your visit with us. Thank you!             Your Updated Medication List - Protect others around you: Learn how to safely use, store and throw away your medicines at www.disposemymeds.org.          This list is accurate as of 9/20/18  8:31 AM.  Always use your most recent med list.                   Brand Name Dispense Instructions for use Diagnosis    albuterol 108 (90 Base) MCG/ACT inhaler    PROAIR HFA/PROVENTIL HFA/VENTOLIN HFA    1 Inhaler    Inhale 2 puffs into the lungs every 6 hours as needed for shortness of breath / dyspnea    SOB (shortness of breath)       buPROPion 150 MG 12 hr tablet    WELLBUTRIN SR    180 tablet    Take 1 tablet (150 mg) by mouth 2 times daily    Anxiety, Tobacco abuse       clonazePAM 1 MG tablet    klonoPIN    30 tablet    Take 0.5-1 tablets (0.5-1 mg) by mouth 2 times daily as needed for anxiety    JEFF (generalized anxiety disorder)       cyclobenzaprine 10 MG tablet    FLEXERIL    60 tablet    Take 0.5-1 tablets  (5-10 mg) by mouth 3 times daily as needed for muscle spasms Caution sedation    Back muscle spasm, Myofascial pain       folic acid 1 MG tablet    FOLVITE     Reported on 5/19/2017        guaiFENesin-codeine 100-10 MG/5ML Soln solution    ROBITUSSIN AC    120 mL    Take 5 mLs by mouth every 4 hours as needed for cough    Cough       HUMIRA PEN 40 MG/0.8ML pen kit   Generic drug:  adalimumab           IBUPROFEN PO      Take 800 mg by mouth every 6 hours as needed for moderate pain Reported on 5/19/2017        ketoconazole 2 % shampoo    NIZORAL    120 mL    Apply to the affected area and wash off after 5 minutes.use until skin condition is better    Seborrheic dermatitis       methotrexate 2.5 MG tablet CHEMO      6 tablets Reported on 4/3/2017        * nicotine polacrilex 2 MG gum    EQ NICOTINE POLACRILEX    170 tablet    Place 1 each (2 mg) inside cheek every 4 hours as needed for smoking cessation    Tobacco use disorder       * nicotine polacrilex 2 MG lozenge    [employer] NICOTINE POLACRILEX    360 lozenge    Place 1 lozenge (2 mg) inside cheek every hour as needed for smoking cessation    Tobacco use disorder       nortriptyline 50 MG capsule    PAMELOR    90 capsule    Take 1 capsule (50 mg) by mouth At Bedtime    Anxiety, Other chronic pain       oxyCODONE IR 5 MG tablet    ROXICODONE    45 tablet    Take 1 tablet every 6-8 hours as needed for pain, max of 2 tabs per day but cannot take 2 tabs every day.  Okay to fill on 9/10/2018 and start on 9/11/2018. To last 30 days. No further early refills    Chronic pain of left knee, Chronic bilateral low back pain with right-sided sciatica       predniSONE 20 MG tablet    DELTASONE    5 tablet    Take 1 tablet (20 mg) by mouth daily    Acute bronchitis, unspecified organism, Acute recurrent maxillary sinusitis       sertraline 50 MG tablet    ZOLOFT    90 tablet    Take 1 tablet (50 mg) by mouth daily    JEFF (generalized anxiety disorder)       * Notice:  This list has 2  medication(s) that are the same as other medications prescribed for you. Read the directions carefully, and ask your doctor or other care provider to review them with you.

## 2018-09-20 NOTE — NURSING NOTE
Pre-procedure Intake    Have you been fasting? NA    If yes, for how long? No     Are you taking a prescribed blood thinner such as coumadin, Plavix, Xarelto?    No    If yes, when did you take your last dose? No     Do you take aspirin?  No    If cervical procedure, have you held aspirin for 6 days?   NA    Do you have any allergies to contrast dye, iodine, steroid and/or numbing medications?  NO    Are you currently taking antibiotics or have an active infection?  NO    Have you had a fever/elevated temperature within the past week? NO    Are you currently taking oral steroids? NO    Do you have a ? Yes       Are you pregnant or breastfeeding?  Not Applicable    Are the vital signs normal?  Yes    Nicolasa Krueger MA

## 2018-10-07 ENCOUNTER — MYC MEDICAL ADVICE (OUTPATIENT)
Dept: PALLIATIVE MEDICINE | Facility: CLINIC | Age: 53
End: 2018-10-07

## 2018-10-07 DIAGNOSIS — M25.562 CHRONIC PAIN OF LEFT KNEE: ICD-10-CM

## 2018-10-07 DIAGNOSIS — M54.41 CHRONIC BILATERAL LOW BACK PAIN WITH RIGHT-SIDED SCIATICA: ICD-10-CM

## 2018-10-07 DIAGNOSIS — G89.29 CHRONIC PAIN OF LEFT KNEE: ICD-10-CM

## 2018-10-07 DIAGNOSIS — G89.29 CHRONIC BILATERAL LOW BACK PAIN WITH RIGHT-SIDED SCIATICA: ICD-10-CM

## 2018-10-08 NOTE — TELEPHONE ENCOUNTER
Received call from patient requesting refill(s) of oxyCODONE IR (ROXICODONE) 5 MG tablet    Last picked up from pharmacy on 9/10/18    Pt last seen by prescribing provider on 8/14/18  Next appt scheduled for 10/26/18     checked in the past 6 months? Yes If no, print current report and give to RN    Last urine drug screen date 4/11/18  Current opioid agreement on file (completed within the last year) Yes Date of opioid agreement: 4/11/18    Processing (pick one and delete the others):   BG clinic    Nancy Bal MA  [facility]    Will route to nursing pool for review and preparation of prescription(s).

## 2018-10-08 NOTE — TELEPHONE ENCOUNTER
From: Jailene Breen      Created: 10/7/2018 8:35 AM        *-*-*This message has not been handled.*-*-*    Hi, can I get a refill of my pain medicine oxycodone. When ready I can , thank you.        Josias Lopez, RN  Care Coordinator   [facility] [place]

## 2018-10-08 NOTE — TELEPHONE ENCOUNTER
Medication refill information reviewed.     Last due:  Okay to fill on 9/10/2018 and start on 9/11/2018    Due date:  10/11/18    Weaning instructions:  N/A    Prescriptions prepped for review.     Cha Molina RN-BSN  [facility]

## 2018-10-09 RX ORDER — OXYCODONE HYDROCHLORIDE 5 MG/1
TABLET ORAL
Qty: 45 TABLET | Refills: 0 | Status: SHIPPED | OUTPATIENT
Start: 2018-10-09 | End: 2018-11-06

## 2018-10-09 NOTE — TELEPHONE ENCOUNTER
Signed Rx placed in  bin, , 2nd floor, [place]. Patient was notified.     Nicolasa Krueger MA

## 2018-10-09 NOTE — TELEPHONE ENCOUNTER
Signed Prescriptions:                        Disp   Refills    oxyCODONE IR (ROXICODONE) 5 MG tablet      45 tab*0        Sig: Take 1 tablet every 6-8 hours as needed for pain, max           of 2 tabs per day but cannot take 2 tabs every           day.  Okay to fill on 10/10/2018 and start on           10/11/2018. To last 30 days. No further early           refills  Authorizing Provider: SUSANA PETTY    Reviewed [place]  October 9, 2018- no concerning fills.    Signed script placed in touchdown bin at [facility].    Susana Petty APRN CNP FNP RN  [facility]

## 2018-10-11 ENCOUNTER — TELEPHONE (OUTPATIENT)
Dept: CARDIOLOGY | Facility: CLINIC | Age: 53
End: 2018-10-11

## 2018-10-11 NOTE — TELEPHONE ENCOUNTER
Called patient prior to stress echo scheduled for Oct 12 to discuss necessary preparation for test and assess for questions/concerns.  Reminded patient to remain NPO except water for at least 3 hours prior to test, take all medications as usual, to avoid caffeine and nicotine for 12 hr (including chocolate, decaf, Excedrin) and to wear comfortable clothing and shoes. Also informed patient that procedure is on a recumbent bike rather than treadmill. All questions answered, patient verbalized understanding.

## 2018-11-04 ENCOUNTER — MYC MEDICAL ADVICE (OUTPATIENT)
Dept: PALLIATIVE MEDICINE | Facility: CLINIC | Age: 53
End: 2018-11-04

## 2018-11-04 DIAGNOSIS — G89.29 CHRONIC PAIN OF LEFT KNEE: ICD-10-CM

## 2018-11-04 DIAGNOSIS — M54.41 CHRONIC BILATERAL LOW BACK PAIN WITH RIGHT-SIDED SCIATICA: ICD-10-CM

## 2018-11-04 DIAGNOSIS — M25.562 CHRONIC PAIN OF LEFT KNEE: ICD-10-CM

## 2018-11-04 DIAGNOSIS — G89.29 CHRONIC BILATERAL LOW BACK PAIN WITH RIGHT-SIDED SCIATICA: ICD-10-CM

## 2018-11-05 NOTE — TELEPHONE ENCOUNTER
Routed to the nursing pool and to the MA pool to process refill(s).    Cha Molina, RN-BSN  [facility]

## 2018-11-06 ENCOUNTER — OFFICE VISIT (OUTPATIENT)
Dept: PALLIATIVE MEDICINE | Facility: CLINIC | Age: 53
End: 2018-11-06
Payer: COMMERCIAL

## 2018-11-06 DIAGNOSIS — M54.41 CHRONIC BILATERAL LOW BACK PAIN WITH RIGHT-SIDED SCIATICA: Primary | ICD-10-CM

## 2018-11-06 DIAGNOSIS — M06.9 RHEUMATOID ARTHRITIS, INVOLVING UNSPECIFIED SITE, UNSPECIFIED RHEUMATOID FACTOR PRESENCE: ICD-10-CM

## 2018-11-06 DIAGNOSIS — M45.9 ANKYLOSING SPONDYLITIS, UNSPECIFIED SITE OF SPINE (H): ICD-10-CM

## 2018-11-06 DIAGNOSIS — M79.18 MYOFASCIAL PAIN: ICD-10-CM

## 2018-11-06 DIAGNOSIS — G89.29 CHRONIC BILATERAL LOW BACK PAIN WITH RIGHT-SIDED SCIATICA: Primary | ICD-10-CM

## 2018-11-06 PROCEDURE — 96152 HC HEALTH AND BEHAVIOR INTERVENTION, INDIVIDUAL, EACH 15 MINUTES: CPT | Performed by: PSYCHOLOGIST

## 2018-11-06 RX ORDER — OXYCODONE HYDROCHLORIDE 5 MG/1
TABLET ORAL
Qty: 45 TABLET | Refills: 0 | Status: SHIPPED | OUTPATIENT
Start: 2018-11-06 | End: 2018-11-16

## 2018-11-06 NOTE — TELEPHONE ENCOUNTER
Signed Prescriptions:                        Disp   Refills    oxyCODONE IR (ROXICODONE) 5 MG tablet      45 tab*0        Sig: Take 1 tablet every 6-8 hours as needed for pain, max           of 2 tabs per day but cannot take 2 tabs every           day.  Okay to fill on 11/8/2018 and start on           11/10/2018. To last 30 days. No further early           refills  Authorizing Provider: SUSANA PETTY    Reviewed [place]  November 6, 2018- no concerning fills.    Patient is to give us 7 days notice for opiate refills, this was less than 30 minutes notice.    Signed script placed in touchdown bin at [facility].    Susana Petty APRN CNP FNP RN  [facility]

## 2018-11-06 NOTE — TELEPHONE ENCOUNTER
Received call from patient requesting refill(s) of oxyCODONE IR (ROXICODONE) 5 MG tablet    Last picked up from pharmacy on 10/10/18    Pt last seen by prescribing provider on 08/14/18 - has had injection since  Next appt scheduled for 11/16/18     checked in the past 6 months? Yes If no, print current report and give to RN    Last urine drug screen date 04/11/18  Current opioid agreement on file (completed within the last year) Yes Date of opioid agreement: 04/11/18    Processing (pick one and delete the others):  Patient is requesting  on 11/06/18 by 11am      Pt will  in clinic at [place] location      Will route to nursing pool for review and preparation of prescription(s).

## 2018-11-06 NOTE — TELEPHONE ENCOUNTER
The oxycodone script was given to pt.  Talked w/ him again about calling in sooner.    Cha Molina RN-BSN  [facility]

## 2018-11-06 NOTE — MR AVS SNAPSHOT
After Visit Summary   11/6/2018    Jailene Breen    MRN: [id number]           Patient Information     Date Of Birth          1965        Visit Information        Provider Department      11/6/2018 11:00 AM Padilla Keene LP [facility]        Today's Diagnoses     Chronic bilateral low back pain with right-sided sciatica    -  1    Myofascial pain        Rheumatoid arthritis, involving unspecified site, unspecified rheumatoid factor presence (H)        Ankylosing spondylitis, unspecified site of spine (H)           Follow-ups after your visit        Your next 10 appointments already scheduled     Nov 09, 2018  9:30 AM CST   Ech Stress Test With Definity with MGECHR1, MG CARD, MG STRESS RM, MG ECHO TECH, MG CV TECH   [facility] ([facility]    [id number] [place] [address] [phone]           1.  Please bring or wear a comfortable two-piece outfit and walking shoes. 2.  Stop eating 3 hours before the test. You may drink water or juice. 3.  Stop all caffeine 12 hours before the test. This includes coffee, tea, soda pop, chocolate and certain medicines (such as Anacin and Excederin). Also avoid decaf coffee and tea, as these contain small amounts of caffeine. 4.  No alcohol, smoking or use of other tobacco products for 12 hours before the test. 5.  Refer to your provider instructions to see if you need to stop any medications (such as beta-blockers or nitrates) for this test. 6.  For patients with diabetes: -   If you take insulin, call your diabetes care team. Ask if you should take a   dose the morning of your test. -   If you take diabetes medicine by mouth, don't take it on the morning of your test. Bring it with you to take after the test.  (If you have questions, call your diabetes care team) 7.  When you arrive, please tell us if: -   You have diabetes. -   You have taken Viagra, Cialis or Levitra in the past 48  hours. 8.  For any questions that cannot be answered, please contact the ordering physician 9. Please do not wear perfumes or scented lotions on the day of your exam.            Nov 16, 2018  8:30 AM CST   Return [facility] with JUANITA Ga CNP   [facility]    [address] [facility] [address] [address]   [phone]            Nov 20, 2018 11:00 AM CST   Return Visit with Padilla Keene LP   [facility] ([facility])    [address] [address]   [phone]            Dec 03, 2018 10:00 AM CST   Return Visit with Padilla Keene LP   [facility] ([facility])    [address] [facility] [address] [facility] [address] [facility] [id number] [phone]              Who to contact     If you have questions or need follow up information about today's clinic visit or your schedule please contact [facility] directly at [phone].  Normal or non-critical lab and imaging results will be communicated to you by MyChart, letter or phone within 4 business days after the clinic has received the results. If you do not hear from us within 7 days, please contact the clinic through [employer] or phone. If you have a critical or abnormal lab result, we will notify you by phone as soon as possible.  Submit refill requests through [employer] or call your pharmacy and they will forward the refill request to us. Please allow 3 business days for your refill to be completed.          Additional Information About Your Visit        [employer] Information     [employer] gives you secure access to your electronic health record. If you see a primary care provider, you can also send messages to your care team and make appointments. If you have questions, please call your primary care clinic.  If you do not have a primary care provider, please call [phone] and they will assist you.        Care EveryWhere  ID     This is your Care EveryWhere ID. This could be used by other organizations to access your [place] medical records  [id number]         Blood Pressure from Last 3 Encounters:   09/20/18 (!) 143/97   09/14/18 130/89   08/14/18 (!) 142/95    Weight from Last 3 Encounters:   09/14/18 120.9 kg (266 lb 9.6 oz)   08/14/18 120.2 kg (265 lb)   04/11/18 119.3 kg (263 lb)              We Performed the Following     HEALTH & BEHAVIOR ASSESS, INITIAL, EA 15MIN PHD        Primary Care Provider Office Phone # Fax #    Tristanflorence Padilla Resendez, [phone] [address] [facility] [address]        Equal Access to Services     SARATH ESPINAL : Hadii kiran pearson hadasho Soomaali, waaxda luqadaha, qaybta kaalmada adeegyada, waxay idiin hayclint davis . So [facility] [phone].    ATENCIÓN: Si habla español, tiene a carrington disposición servicios gratuitos de asistencia lingüística. Torriame al [phone].    We comply with applicable federal civil rights laws and [place] laws. We do not discriminate on the basis of race, color, national origin, age, disability, sex, sexual orientation, or gender identity.            Thank you!     Thank you for choosing [facility]  for your care. Our goal is always to provide you with excellent care. Hearing back from our patients is one way we can continue to improve our services. Please take a few minutes to complete the written survey that you may receive in the mail after your visit with us. Thank you!             Your Updated Medication List - Protect others around you: Learn how to safely use, store and throw away your medicines at www.disposemymeds.org.          This list is accurate as of 11/6/18 12:02 PM.  Always use your most recent med list.                   Brand Name Dispense Instructions for use Diagnosis    albuterol 108 (90 Base) MCG/ACT inhaler    PROAIR HFA/PROVENTIL HFA/VENTOLIN HFA    1 Inhaler    Inhale 2 puffs into the lungs every  6 hours as needed for shortness of breath / dyspnea    SOB (shortness of breath)       buPROPion 150 MG 12 hr tablet    WELLBUTRIN SR    180 tablet    Take 1 tablet (150 mg) by mouth 2 times daily    Anxiety, Tobacco abuse       clonazePAM 1 MG tablet    klonoPIN    30 tablet    Take 0.5-1 tablets (0.5-1 mg) by mouth 2 times daily as needed for anxiety    JEFF (generalized anxiety disorder)       cyclobenzaprine 10 MG tablet    FLEXERIL    60 tablet    Take 0.5-1 tablets (5-10 mg) by mouth 3 times daily as needed for muscle spasms Caution sedation    Back muscle spasm, Myofascial pain       folic acid 1 MG tablet    FOLVITE     Reported on 5/19/2017        guaiFENesin-codeine 100-10 MG/5ML Soln solution    ROBITUSSIN AC    120 mL    Take 5 mLs by mouth every 4 hours as needed for cough    Cough       HUMIRA PEN 40 MG/0.8ML pen kit   Generic drug:  adalimumab           IBUPROFEN PO      Take 800 mg by mouth every 6 hours as needed for moderate pain Reported on 5/19/2017        ketoconazole 2 % shampoo    NIZORAL    120 mL    Apply to the affected area and wash off after 5 minutes.use until skin condition is better    Seborrheic dermatitis       methotrexate 2.5 MG tablet CHEMO      6 tablets Reported on 4/3/2017        * nicotine polacrilex 2 MG gum    EQ NICOTINE POLACRILEX    170 tablet    Place 1 each (2 mg) inside cheek every 4 hours as needed for smoking cessation    Tobacco use disorder       * nicotine polacrilex 2 MG lozenge    [employer] NICOTINE POLACRILEX    360 lozenge    Place 1 lozenge (2 mg) inside cheek every hour as needed for smoking cessation    Tobacco use disorder       nortriptyline 50 MG capsule    PAMELOR    90 capsule    Take 1 capsule (50 mg) by mouth At Bedtime    Anxiety, Other chronic pain       oxyCODONE IR 5 MG tablet    ROXICODONE    45 tablet    Take 1 tablet every 6-8 hours as needed for pain, max of 2 tabs per day but cannot take 2 tabs every day.  Okay to fill on 11/8/2018 and start on  11/10/2018. To last 30 days. No further early refills    Chronic pain of left knee, Chronic bilateral low back pain with right-sided sciatica       predniSONE 20 MG tablet    DELTASONE    5 tablet    Take 1 tablet (20 mg) by mouth daily    Acute bronchitis, unspecified organism, Acute recurrent maxillary sinusitis       sertraline 50 MG tablet    ZOLOFT    90 tablet    Take 1 tablet (50 mg) by mouth daily    JEFF (generalized anxiety disorder)       * Notice:  This list has 2 medication(s) that are the same as other medications prescribed for you. Read the directions carefully, and ask your doctor or other care provider to review them with you.

## 2018-11-06 NOTE — TELEPHONE ENCOUNTER
Medication refill information reviewed.     Due date for oxyCODONE IR (ROXICODONE) 5 MG tablet is 11/10/18     Prescriptions prepped for review.     Will route to provider.     Pt will  in clinic at [facility]    Josias Lopez, SANDRA  Care Coordinator   [facility] [place]

## 2018-11-06 NOTE — PROGRESS NOTES
[facility]   [facility]  Behavioral Medicine Visit    Patient Name: Jailene Breen    YOB: 1965   Medical Record Number: [id number]  Date: 11/6/2018                SUBJECTIVE: Met with the patient on this date for an elective return appointment.  Today's meeting is our first since September 2017.  Today the patient reports significant stress with #1 pain, #2 finances, #3 psychosocial stressors such as schoolwork #4 unemployment.  The patient reports since our last meeting he has filed for disability twice and been denied.  Patient reports he is working with  now trying to establish a hearing date so that they can be seen by a .    The patient has complaints of both anxiousness and depressive symptoms he denies that he is at risk for self-harm.  He does appear anxious and agitated while in conversation today.  He reports that he recently went back on sertraline 50 mg but does not feel as though he is getting any benefit from it.  He has a follow-up appointment with his primary care provider in approximately 1 week's time.  The patient reports that he would like to work on how his chronic pain is affecting his ability to get work and about his all-around sense of worthlessness.  This seems appropriate to me.  Plan is for the patient to follow-up in approximately 2 weeks time.          OBJECTIVE: Today the patient reports the following: His pain level is mildly worse, his mood is moderately worse, his activity level remains the same, his stress level is greatly worse, his sleep is mildly worse.  Today the patient reports that he is involved in self-care activities 2-3 times per day.  Today the patient reports since receiving services at [facility] his overall progress is with no improvement.   Length of Visit: 60 minutes      Assessment: Current Emotional / Mental  Status    Appearance:   Appropriate   Eye Contact:   Fair   Psychomotor Behavior: Agitated  Normal   Attitude:   Cooperative   Orientation:   All  Speech  Rate / Production:             Hyperverbal   Volume:              Normal   Mood:    Anxious  Depressed   Affect:    Appropriate   Thought Content:  Clear   Thought Form:  Coherent  Logical   Insight:    Poor     ASSESSMENT:   Per patient pain provider: Chronic bilateral low back pain with right-sided sciatica, myofascial pain, rheumatoid arthritis involving unspecified site, unspecified rheumatoid factor presents, ankylosing spondylitis, unspecified site of spine    Progress toward goals: adequate.    Pain Status: worsened    Emotional Status: worsened              Medication / chemical use concerns: None    PLAN:   Next Appointment: Jailene Breen will schedule a follow-up appointment in 2 weeks.  Assignment: See above  Objectives / interventions for next session: See above    Padilla Keene MA LP, LADC  Behavioral Pain Specialist  [facility] [place]

## 2018-11-08 ENCOUNTER — TELEPHONE (OUTPATIENT)
Dept: FAMILY MEDICINE | Facility: CLINIC | Age: 53
End: 2018-11-08

## 2018-11-08 ENCOUNTER — TELEPHONE (OUTPATIENT)
Dept: GENERAL RADIOLOGY | Facility: CLINIC | Age: 53
End: 2018-11-08

## 2018-11-08 DIAGNOSIS — Z12.11 SPECIAL SCREENING FOR MALIGNANT NEOPLASMS, COLON: Primary | ICD-10-CM

## 2018-11-08 NOTE — TELEPHONE ENCOUNTER
Spoke with Les, about preparation for stress test. Patient will have nothing to eat or drink 3 hours prior except patient can have water. Patient will have no Caffeine or Nicotine including Coffee, de-caffeine , tea chocolate etc. For 12 hours prior. Patient can take all medication as prescribed. No other concerns. LK

## 2018-11-08 NOTE — TELEPHONE ENCOUNTER
Panel Management Review        Last Office Visit with this department:     Fail List measure: Colonoscopy      Patient is due/failing the following:   COLONOSCOPY    Action needed:   Patient needs referral/order: Colonoscopy    Type of outreach:    Sent [employer] message.    Questions for provider review:    None                                                                                                                                    Stephanie Hawkins MA

## 2018-11-09 ENCOUNTER — RADIANT APPOINTMENT (OUTPATIENT)
Dept: CARDIOLOGY | Facility: CLINIC | Age: 53
End: 2018-11-09
Attending: PHYSICIAN ASSISTANT
Payer: COMMERCIAL

## 2018-11-09 VITALS — DIASTOLIC BLOOD PRESSURE: 80 MMHG | SYSTOLIC BLOOD PRESSURE: 158 MMHG | HEART RATE: 80 BPM

## 2018-11-09 DIAGNOSIS — R06.02 SOB (SHORTNESS OF BREATH): ICD-10-CM

## 2018-11-09 PROCEDURE — 93018 CV STRESS TEST I&R ONLY: CPT | Performed by: INTERNAL MEDICINE

## 2018-11-09 PROCEDURE — 93321 DOPPLER ECHO F-UP/LMTD STD: CPT | Mod: 26 | Performed by: INTERNAL MEDICINE

## 2018-11-09 PROCEDURE — 93321 DOPPLER ECHO F-UP/LMTD STD: CPT | Mod: TC

## 2018-11-09 PROCEDURE — 93325 DOPPLER ECHO COLOR FLOW MAPG: CPT | Mod: 26 | Performed by: INTERNAL MEDICINE

## 2018-11-09 PROCEDURE — 93016 CV STRESS TEST SUPVJ ONLY: CPT

## 2018-11-09 PROCEDURE — 93017 CV STRESS TEST TRACING ONLY: CPT

## 2018-11-09 PROCEDURE — 93350 STRESS TTE ONLY: CPT | Mod: 26 | Performed by: INTERNAL MEDICINE

## 2018-11-09 PROCEDURE — 93352 ADMIN ECG CONTRAST AGENT: CPT

## 2018-11-09 PROCEDURE — 93350 STRESS TTE ONLY: CPT | Mod: TC

## 2018-11-09 PROCEDURE — 93325 DOPPLER ECHO COLOR FLOW MAPG: CPT | Mod: TC

## 2018-11-09 RX ADMIN — Medication 3.5 ML: at 10:05

## 2018-11-09 NOTE — NURSING NOTE
Patient presents today for stress echo ordered by MD. Prior to patient visit, chart prep done including confirmation of order, medications reviewed for contraindications, reviewed previous EKG's for trends & concerns and reviewed patient's medical history.     IV started in right AC with a 22G Jeclo Catheter.      Echo technician completed resting portion of echo.     Stress portion of echo completed utilizing bike and pictures taken at peak.  Blood pressure taken every 2 minutes and documented in Muse system.     Definity medication used 3.5ml, wasted 6.5ml Definity NDC # [id number] (1.5ml Definity mixed with 8.5ml Saline )    Patient offered complaints of: fatigue     After completion of stress echo, recovery period with blood pressure monitoring occurs at 1, 3 and 5 minutes and documented in Muse.  IV removed and water provided to patient.  Patient education provided about cardiology interpretation and primary provider will be notified of results.     Dr Dorman provided supervision of the tests performed today.

## 2018-11-16 ENCOUNTER — OFFICE VISIT (OUTPATIENT)
Dept: PALLIATIVE MEDICINE | Facility: CLINIC | Age: 53
End: 2018-11-16
Payer: COMMERCIAL

## 2018-11-16 VITALS
HEART RATE: 77 BPM | SYSTOLIC BLOOD PRESSURE: 138 MMHG | DIASTOLIC BLOOD PRESSURE: 85 MMHG | BODY MASS INDEX: 32.26 KG/M2 | WEIGHT: 265 LBS

## 2018-11-16 DIAGNOSIS — G89.29 CHRONIC BILATERAL LOW BACK PAIN WITH RIGHT-SIDED SCIATICA: ICD-10-CM

## 2018-11-16 DIAGNOSIS — M54.59 LUMBAR FACET JOINT PAIN: Primary | ICD-10-CM

## 2018-11-16 DIAGNOSIS — G89.29 BILATERAL CHRONIC KNEE PAIN: ICD-10-CM

## 2018-11-16 DIAGNOSIS — M47.817 LUMBOSACRAL SPONDYLOSIS WITHOUT MYELOPATHY: ICD-10-CM

## 2018-11-16 DIAGNOSIS — M54.41 CHRONIC BILATERAL LOW BACK PAIN WITH RIGHT-SIDED SCIATICA: ICD-10-CM

## 2018-11-16 DIAGNOSIS — M79.18 MYOFASCIAL PAIN: ICD-10-CM

## 2018-11-16 DIAGNOSIS — M25.561 BILATERAL CHRONIC KNEE PAIN: ICD-10-CM

## 2018-11-16 DIAGNOSIS — M25.562 BILATERAL CHRONIC KNEE PAIN: ICD-10-CM

## 2018-11-16 PROCEDURE — 99213 OFFICE O/P EST LOW 20 MIN: CPT | Performed by: NURSE PRACTITIONER

## 2018-11-16 RX ORDER — OXYCODONE HYDROCHLORIDE 5 MG/1
TABLET ORAL
Qty: 45 TABLET | Refills: 0 | Status: SHIPPED | OUTPATIENT
Start: 2018-11-16 | End: 2018-12-26

## 2018-11-16 ASSESSMENT — PAIN SCALES - GENERAL: PAINLEVEL: EXTREME PAIN (8)

## 2018-11-16 NOTE — MR AVS SNAPSHOT
After Visit Summary   11/16/2018    Jailene Breen    MRN: [id number]           Patient Information     Date Of Birth          1965        Visit Information        Provider Department      11/16/2018 8:30 AM Susana Pike APRN [facility]        Today's Diagnoses     Lumbar facet joint pain    -  1    Chronic right-sided low back pain without sciatica        Lumbosacral spondylosis without myelopathy        Bilateral chronic knee pain        Myofascial pain        Chronic pain of left knee        Chronic bilateral low back pain with right-sided sciatica          Care Instructions    PLAN  1. Medications:   1. Continue Oxycodone 5mg twice daily as needed.  2. Continue Flexeril 5-10mg take 3 times daily as needed.  2. Procedures: Schedule lumbar facet joint steroid injections, the office will contact you with further instructions.  3. Follow-up with Padilla Keene from health psychology.   4. Consider the purchase of a Happy Light, these can be found online or at [employer]. Can be helpful for mood, particularly in the winter months  5. Follow-up with me in 8-12 weeks        ----------------------------------------------------------------  Clinic Number:  [phone]   Call this number with any questions about your care and for scheduling assistance. Calls are returned Monday through Friday between 8 AM and 4:30 PM. We usually get back to you within 2 business days depending on the issue/request.       Medication refills:    For non-narcotic medications, call your pharmacy directly to request a refill. The pharmacy will contact the [facility] for authorization. Please allow 3-4 days for these refills to be processed.     For narcotic refills, call the clinic number or send a [employer] message. Please contact us 7-10 days before your refill is due. The message MUST include the name of the specific medication(s) requested and how you would like to  receive the prescription(s). The options are as follows:    Pain Clinic staff can mail the prescription to your pharmacy. Please tell us the name of the pharmacy.    You may pick the prescription up at the Pain Clinic (tell us the location) or during a clinic visit with your pain provider    Pain Clinic staff can deliver the prescription to the [facility] pharmacy in the clinic building. Please tell us the location.      We believe regular attendance is key to your success in our program.    Any time you are unable to keep your appointment we ask that you call us at least 24 hours in advance to let us know. This will allow us to offer the appointment time to another patient.               Follow-ups after your visit        Additional Services     PAIN INJECTION EVAL/TREAT/FOLLOW UP                 Your next 10 appointments already scheduled     Nov 20, 2018 11:00 AM CST   Return Visit with Padilla Keene LP   [facility] ([facility])    [address] [address]   [phone]            Dec 03, 2018 10:00 AM CST   Return Visit with Padilla Keene LP   [facility] ([facility])    [address] [address]   [phone]              Who to contact     If you have questions or need follow up information about today's clinic visit or your schedule please contact [facility] directly at [phone].  Normal or non-critical lab and imaging results will be communicated to you by MyChart, letter or phone within 4 business days after the clinic has received the results. If you do not hear from us within 7 days, please contact the clinic through MyChart or phone. If you have a critical or abnormal lab result, we will notify you by phone as soon as possible.  Submit refill requests through [employer] or call your pharmacy and they will forward the refill request to us. Please allow 3 business days  for your refill to be completed.          Additional Information About Your Visit        Reblshart Information     [employer] gives you secure access to your electronic health record. If you see a primary care provider, you can also send messages to your care team and make appointments. If you have questions, please call your primary care clinic.  If you do not have a primary care provider, please call [phone] and they will assist you.        Care EveryWhere ID     This is your Care EveryWhere ID. This could be used by other organizations to access your [facility] medical records  [id number]        Your Vitals Were     Pulse BMI (Body Mass Index)                77 32.26 kg/m2           Blood Pressure from Last 3 Encounters:   11/16/18 138/85   11/09/18 158/80   09/20/18 (!) 143/97    Weight from Last 3 Encounters:   11/16/18 120.2 kg (265 lb)   09/14/18 120.9 kg (266 lb 9.6 oz)   08/14/18 120.2 kg (265 lb)              We Performed the Following     PAIN INJECTION EVAL/TREAT/FOLLOW UP          Today's Medication Changes          These changes are accurate as of 11/16/18  9:03 AM.  If you have any questions, ask your nurse or doctor.               These medicines have changed or have updated prescriptions.        Dose/Directions    oxyCODONE IR 5 MG tablet   Commonly known as:  ROXICODONE   This may have changed:  additional instructions   Used for:  Chronic pain of left knee, Chronic bilateral low back pain with right-sided sciatica   Changed by:  Ssuana Pike APRN CNP        Take 1 tablet every 6-8 hours as needed for pain, max of 2 tabs per day but cannot take 2 tabs every day.  Okay to fill on 12/6/2018 and start on 12/9/2018. To last 30 days. No further early refills   Quantity:  45 tablet   Refills:  0            Where to get your medicines      Some of these will need a paper prescription and others can be bought over the counter.  Ask your nurse if you have questions.     Bring a paper prescription  for each of these medications     oxyCODONE IR 5 MG tablet               Information about OPIOIDS     PRESCRIPTION OPIOIDS: WHAT YOU NEED TO KNOW   We gave you an opioid (narcotic) pain medicine. It is important to manage your pain, but opioids are not always the best choice. You should first try all the other options your care team gave you. Take this medicine for as short a time (and as few doses) as possible.    Some activities can increase your pain, such as bandage changes or therapy sessions. It may help to take your pain medicine 30 to 60 minutes before these activities. Reduce your stress by getting enough sleep, working on hobbies you enjoy and practicing relaxation or meditation. Talk to your care team about ways to manage your pain beyond prescription opioids.    These medicines have risks:    DO NOT drive when on new or higher doses of pain medicine. These medicines can affect your alertness and reaction times, and you could be arrested for driving under the influence (DUI). If you need to use opioids long-term, talk to your care team about driving.    DO NOT operate heavy machinery    DO NOT do any other dangerous activities while taking these medicines.    DO NOT drink any alcohol while taking these medicines.     If the opioid prescribed includes acetaminophen, DO NOT take with any other medicines that contain acetaminophen. Read all labels carefully. Look for the word  acetaminophen  or  Tylenol.  Ask your pharmacist if you have questions or are unsure.    You can get addicted to pain medicines, especially if you have a history of addiction (chemical, alcohol or substance dependence). Talk to your care team about ways to reduce this risk.    All opioids tend to cause constipation. Drink plenty of water and eat foods that have a lot of fiber, such as fruits, vegetables, prune juice, apple juice and high-fiber cereal. Take a laxative (Miralax, milk of magnesia, Colace, Senna) if you don t move your  bowels at least every other day. Other side effects include upset stomach, sleepiness, dizziness, throwing up, tolerance (needing more of the medicine to have the same effect), physical dependence and slowed breathing.    Store your pills in a secure place, locked if possible. We will not replace any lost or stolen medicine. If you don t finish your medicine, please throw away (dispose) as directed by your pharmacist. The [place] Pollution Control Agency has more information about safe disposal: [email or web address]         Primary Care Provider Office Phone # Fax #    [place] JASON Meyer [phone] [address] [facility] [address]        Equal Access to Services     SARATH ESPINAL : Hadii aad ku hadasho Sosheilaali, waaxda luqadaha, qaybta kaalmada adeegyada, stephanie paredes. So [facility] [phone].    ATENCIÓN: Si habla español, tiene a carrington disposición servicios gratuitos de asistencia lingüística. [facility] [phone].    We comply with applicable federal civil rights laws and [place] laws. We do not discriminate on the basis of race, color, national origin, age, disability, sex, sexual orientation, or gender identity.            Thank you!     Thank you for choosing [facility]  for your care. Our goal is always to provide you with excellent care. Hearing back from our patients is one way we can continue to improve our services. Please take a few minutes to complete the written survey that you may receive in the mail after your visit with us. Thank you!             Your Updated Medication List - Protect others around you: Learn how to safely use, store and throw away your medicines at www.disposemymeds.org.          This list is accurate as of 11/16/18  9:03 AM.  Always use your most recent med list.                   Brand Name Dispense Instructions for use Diagnosis    albuterol 108 (90  Base) MCG/ACT inhaler    PROAIR HFA/PROVENTIL HFA/VENTOLIN HFA    1 Inhaler    Inhale 2 puffs into the lungs every 6 hours as needed for shortness of breath / dyspnea    SOB (shortness of breath)       buPROPion 150 MG 12 hr tablet    WELLBUTRIN SR    180 tablet    Take 1 tablet (150 mg) by mouth 2 times daily    Anxiety, Tobacco abuse       clonazePAM 1 MG tablet    klonoPIN    30 tablet    Take 0.5-1 tablets (0.5-1 mg) by mouth 2 times daily as needed for anxiety    JEFF (generalized anxiety disorder)       cyclobenzaprine 10 MG tablet    FLEXERIL    60 tablet    Take 0.5-1 tablets (5-10 mg) by mouth 3 times daily as needed for muscle spasms Caution sedation    Back muscle spasm, Myofascial pain       folic acid 1 MG tablet    FOLVITE     Reported on 5/19/2017        guaiFENesin-codeine 100-10 MG/5ML Soln solution    ROBITUSSIN AC    120 mL    Take 5 mLs by mouth every 4 hours as needed for cough    Cough       HUMIRA PEN 40 MG/0.8ML pen kit   Generic drug:  adalimumab           IBUPROFEN PO      Take 800 mg by mouth every 6 hours as needed for moderate pain Reported on 5/19/2017        ketoconazole 2 % shampoo    NIZORAL    120 mL    Apply to the affected area and wash off after 5 minutes.use until skin condition is better    Seborrheic dermatitis       methotrexate 2.5 MG tablet CHEMO      6 tablets Reported on 4/3/2017        * nicotine polacrilex 2 MG gum    EQ NICOTINE POLACRILEX    170 tablet    Place 1 each (2 mg) inside cheek every 4 hours as needed for smoking cessation    Tobacco use disorder       * nicotine polacrilex 2 MG lozenge    [employer] NICOTINE POLACRILEX    360 lozenge    Place 1 lozenge (2 mg) inside cheek every hour as needed for smoking cessation    Tobacco use disorder       nortriptyline 50 MG capsule    PAMELOR    90 capsule    Take 1 capsule (50 mg) by mouth At Bedtime    Anxiety, Other chronic pain       oxyCODONE IR 5 MG tablet    ROXICODONE    45 tablet    Take 1 tablet every 6-8 hours as  needed for pain, max of 2 tabs per day but cannot take 2 tabs every day.  Okay to fill on 12/6/2018 and start on 12/9/2018. To last 30 days. No further early refills    Chronic pain of left knee, Chronic bilateral low back pain with right-sided sciatica       predniSONE 20 MG tablet    DELTASONE    5 tablet    Take 1 tablet (20 mg) by mouth daily    Acute bronchitis, unspecified organism, Acute recurrent maxillary sinusitis       sertraline 50 MG tablet    ZOLOFT    90 tablet    Take 1 tablet (50 mg) by mouth daily    JEFF (generalized anxiety disorder)       * Notice:  This list has 2 medication(s) that are the same as other medications prescribed for you. Read the directions carefully, and ask your doctor or other care provider to review them with you.

## 2018-11-16 NOTE — PROGRESS NOTES
[facility]    11/16/2018     Chief complaint: lower  back pain. Left knee pain. Numbness in right great toe      Interval history:  Jailene Breen is a 53 year old male is known to me for   Lumbar spondylosis, pain is worse with extension and rotation indicating a facetogenic component to pain  Lumbar degenerative disc disease  SI joint pain  Ankylosing spondylitis  Myofascial pain  Chronic pain syndrome  PMHx includes: Panic attacks, back pain, arthritis, anxiety, ankylosing spondylitis  PSHx includes: Right knee surgery ×2, left foot surgery right knee arthroscopy        Recommendations/plan at the last visit on 8/14/2018 included:  1. Recommended to increase activity while pacing himself  2. Physical Therapy:  I agree with left knee physical therapy  3. Clinical Health Psychologist: The patient will schedule a follow up with Padilla Keene  to address issues of relaxation, behavioral change, coping style, and other factors important to improvement.    4. Diagnostic Studies:  none  5. Medication Management:    1. Continue oxycodone 5mg BID PRN  2. Continue Flexeril 5-10mg TID PRN   6. Further procedures recommended: schedule piriformis muscle injections, office to contact patient. The patient will schedule injections 2 weeks after finishing prednisone.   7. If right foot pain remains an issue, the pt will consider seeing a podiatrist.   8. Recommendations to PCP: see above  9. Patient to follow up with Primary Care provider regarding elevated blood pressure.  10. Follow up: 8-10 weeks      Since his last visit, Jailene Breen reports:  -His pain has increased over the last 2-3 weeks.  -The patient has been experiencing increased stress. His symptoms were aggravated by recent sneezing attacks.  -Painful symptoms are located in the low back right sided, pain radiates into the groin area.  -The patient reports that piriformis injection on 9/20/2018 were helpful.       At this point, the  "patient's participation with our multidisciplinary team includes:  The patient has been compliant with the program.  Pain Group - not ordered  PT - has seen Cyndee White, none recently  Health Psych - seeing Padilla Keene, most recently on 11/6/2018      Pain scores:  Pain intensity on average is 7 on a scale of 0-10.    Range is 5-10/10.   Pain right now is 8/10.  Pain is described as \"penetrating, stabbing, and miserable.\"  Pain is constant in nature    Current pain relevant medications:   -nortriptyline 50mg at bedtime (helpful)  -humira 40mg twice monthly(helpful)  -ibuprofen 800mg TID PRN (using 3 times daily-helpful)  -Tylenol 500mg using 1000mg TID (unsure if helpful)  -Maxalt 10mg PRN migraine (helpful for migraine--he does have visual aura)  -Oxycodone 5mg BID PRN (taking 1-2 tabs per day)   -Flexeril 10mg TID PRN (helpful)      Other pertinent medications:  -clonazepam 1mg tab, may take 0.5-1mg BID PRN anxiety (helpful--last given #10 tabs on 7/13/2018)     Previous Medications:  OPIATES: Oxycodone (helpful), Fentanyl (100mcg/hr patch helpful), hydrocodone (itching), Tramadol (not helpful)   NSAIDS: ibuprofen (not helpful), Aleve (not helpful)  MUSCLE RELAXANTS: methocarbamol (somewhat helpful), Flexeril (somewhat helpful), tizanidine (unsure if helpful), metaxalone (unsure), SOMA (helpful)  ANTI-MIGRAINE MEDS: Maxalt (helpful for migraine), Imitrex injection (somewhat helpful)  ANTI-DEPRESSANTS: Prozac (helpful), Cymbalta (felt weird on it), nortriptyline (unsure if helpful), Buspar (unsure), Remeron (blacked out), bupropion (not helpful for smoking cessation)  SLEEP AIDS: Ambien (helpful)  ANTI-CONVULSANTS: gabapentin (felt odd on this medication, unsure of dose), Lyrica (not helpful for 4 months, unsure of dose), Topamax (not helpful, he felt weird on it)   TOPICALS: Lidocaine patches (not helpful), Voltaren gel (not helpful)  Other meds: Tylenol (unsure if helpful)        Other treatments have " included:  Jailene Breen has been seen at a pain clinic in the past.  [facility]  PT: tried, not helpful  Chiropractic: tried, not helpful  Acupuncture: none  TENs Unit: tried, helpful     Injections:   -has had injections at outside clinics  -has had epidural injections for low back pain (one was helpful)  -he has had lumbar medial branch blocks at [facility] and he was going to have lumbar radiofrequency ablation (did not do this due to cost)  -4/3/2017 right LMBBs with Dr. Ashlyn Gamble (helpful)  -4/26/2017 right LMBBs with Dr. Ashlyn Gamble (helpful)  -5/31/2017 right L3, L4, L5 RFA with Dr. Ashlyn Gamble (good relief for over 6 months)  -3/15/2018 right L5 transforaminal MAKAYLA with Dr. Ashlyn Gamble (not helpful)  -5/10/2018 trigger point injections with Dr. Ashlyn Gamble(somewhat helpful).  -7/12/2018 Right L3, L4, L5 RFA with Dr. Ashlyn Gamble (helpful).  -9/20/2018 Left piriformis injections, bilateral gluteal trigger point with Dr. Gamble (helpful).          Side Effects: no side effect  Patient is using the medication as prescribed: YES    Medications:  Current Outpatient Prescriptions   Medication Sig Dispense Refill     albuterol (PROAIR HFA/PROVENTIL HFA/VENTOLIN HFA) 108 (90 Base) MCG/ACT inhaler Inhale 2 puffs into the lungs every 6 hours as needed for shortness of breath / dyspnea 1 Inhaler 3     cyclobenzaprine (FLEXERIL) 10 MG tablet Take 0.5-1 tablets (5-10 mg) by mouth 3 times daily as needed for muscle spasms Caution sedation 60 tablet 2     folic acid (FOLVITE) 1 MG tablet Reported on 5/19/2017       HUMIRA PEN 40 MG/0.8ML pen kit        IBUPROFEN PO Take 800 mg by mouth every 6 hours as needed for moderate pain Reported on 5/19/2017       methotrexate 2.5 MG tablet 6 tablets Reported on 4/3/2017       nicotine polacrilex ([employer]S NICOTINE POLACRILEX) 2 MG lozenge Place 1 lozenge (2 mg) inside cheek every hour as needed for smoking cessation 360 lozenge 1     nortriptyline  (PAMELOR) 50 MG capsule Take 1 capsule (50 mg) by mouth At Bedtime 90 capsule 1     oxyCODONE IR (ROXICODONE) 5 MG tablet Take 1 tablet every 6-8 hours as needed for pain, max of 2 tabs per day but cannot take 2 tabs every day.  Okay to fill on 11/8/2018 and start on 11/10/2018. To last 30 days. No further early refills 45 tablet 0     predniSONE (DELTASONE) 20 MG tablet Take 1 tablet (20 mg) by mouth daily 5 tablet 0     sertraline (ZOLOFT) 50 MG tablet Take 1 tablet (50 mg) by mouth daily 90 tablet 1     buPROPion (WELLBUTRIN SR) 150 MG 12 hr tablet Take 1 tablet (150 mg) by mouth 2 times daily (Patient not taking: Reported on 3/15/2018) 180 tablet 1     clonazePAM (KLONOPIN) 1 MG tablet Take 0.5-1 tablets (0.5-1 mg) by mouth 2 times daily as needed for anxiety (Patient not taking: Reported on 11/16/2018) 30 tablet 1     guaiFENesin-codeine (ROBITUSSIN AC) 100-10 MG/5ML SOLN solution Take 5 mLs by mouth every 4 hours as needed for cough (Patient not taking: Reported on 3/15/2018) 120 mL 0     ketoconazole (NIZORAL) 2 % shampoo Apply to the affected area and wash off after 5 minutes.use until skin condition is better (Patient not taking: Reported on 3/15/2018) 120 mL 1     nicotine polacrilex (EQ NICOTINE POLACRILEX) 2 MG gum Place 1 each (2 mg) inside cheek every 4 hours as needed for smoking cessation (Patient not taking: Reported on 11/16/2018) 170 tablet 5       Medical History: any changes in medical history since they were last seen? No    Social History:   Home situation: , has 2 children in college  Occupation/Schooling: currently unemployed, worked as a  (unemployed since 3/1/2017)  Tobacco use: none  Alcohol use: none  Drug use: none  History of chemical dependency treatment: none    Is patient a current smoker or tobacco user?  no  If yes, was cessation counseling offered?  no        Review of Systems:  ROS is positive as per HPI as well as for ringing in ears, joint pain, arthritis, back  pain, numbness/tingling, depression, anxiety, stress, and is negative for fevers, chills, sweats, or constipation, diarrhea.    This document serves as a record of the services and decisions personally performed and made by Susana GRANT. It was created on her behalf by Asif Carreno, a trained medical scribe. The creation of this document is based on the provider's statements to the medical scribe.  Asif Carreno 8:47 AM November 16, 2018          Physical Exam:  Vital signs: /85  Pulse 77  Wt 120.2 kg (265 lb)  BMI 32.26 kg/m2     Behavioral observations:  Awake, alert, cooperative.     Gait:  normal    Musculoskeletal exam:    Moves well in the exam room  Strength grossly equal throughout  Lumbar flexion 80 degrees  Lumbar extension 25 degrees painful  Lumbar ext/rotation to right is painful  Lumbar ext/rotation to the left is non painful  Piriformis R>L  SI joint are somewhat tender on the right  Piriformis are somewhat tender on the right    Neuro exam:  SILT in all extremities     Skin/vascular/autonomic:  No suspicious lesions on exposed skin.      Other:  NA    Is the patient hypertensive today? No  Hypertensive on recheck of BP?   N/A  If yes, was patient recommended to see Primary Care Provider in follow up for management of HTN?  N/A        [place] Prescription Monitoring Program:  Reviewed 11/16/2018, no issues    DIRE Score for selecting candidates for long term opioid analgesia for chronic pain:  Diagnosis  2  Intractablility  2  Risk    Psychological health  2    Chemical health  2    Reliability  2    Social support  2  Efficacy  2    Total DIRE Score = 14. Note that  7-13 predicts poor outcome (compliance and efficacy) from opioid prescribing; 14-21 predicts good outcome (compliance and efficacy)  from opioid prescribing.      Assessment:   1. Lumbar facet joint pain     2. Lumbosacral spondylosis without myelopathy   3. Chronic right-sided low back pain with right sided  sciatica  4. Bilateral chronic knee pain  5. Back muscle spasms  6. Myofascial pain  7. Chronic pain syndrome  8. PMHx includes: Panic attacks, back pain, arthritis, anxiety, ankylosing spondylitis  9. PSHx includes: Right knee surgery ×2, left foot surgery right knee arthroscopy      Plan:   1. Recommended to increase activity while pacing himself  2. Physical Therapy:  I agree with left knee physical therapy  3. Clinical Health Psychologist: The patient will schedule a follow up with Padilla Keene  to address issues of relaxation, behavioral change, coping style, and other factors important to improvement.    4. Diagnostic Studies:  none  5. Medication Management:    1. Continue oxycodone 5mg BID PRN  2. Continue Flexeril 5-10mg TID PRN   6. Further procedures recommended: Patient to schedule lumbar facet joint steroid injections, office to contact patient.  7. Patient to consider purchase of a Happy Light, can be helpful for mood in the darker months of the year  8. Recommendations to PCP: see above  9. Follow up: 8-12 weeks    Total time spent face to face was 20 minutes and more than 50% of face to face time was spent in counseling and/or coordination of care regarding the diagnosis and recommendations above.    The information in this document, created by the medical scribe for me, accurately reflects the services I personally performed and the decisions made by me. I have reviewed and approved this document for accuracy prior to leaving the patient care area.  November 16, 2018         Susana GRANT RN CNP, FNP  [facility]

## 2018-11-16 NOTE — PATIENT INSTRUCTIONS
PLAN  1. Medications:   1. Continue Oxycodone 5mg twice daily as needed.  2. Continue Flexeril 5-10mg take 3 times daily as needed.  2. Procedures: Schedule lumbar facet joint steroid injections, the office will contact you with further instructions.  3. Follow-up with Padilla Keene from health psychology.   4. Consider the purchase of a Happy Light, these can be found online or at [employer]. Can be helpful for mood, particularly in the winter months  5. Follow-up with me in 8-12 weeks        ----------------------------------------------------------------  Clinic Number:  [phone]   Call this number with any questions about your care and for scheduling assistance. Calls are returned Monday through Friday between 8 AM and 4:30 PM. We usually get back to you within 2 business days depending on the issue/request.       Medication refills:    For non-narcotic medications, call your pharmacy directly to request a refill. The pharmacy will contact the [facility] for authorization. Please allow 3-4 days for these refills to be processed.     For narcotic refills, call the clinic number or send a OYE! message. Please contact us 7-10 days before your refill is due. The message MUST include the name of the specific medication(s) requested and how you would like to receive the prescription(s). The options are as follows:    Pain Clinic staff can mail the prescription to your pharmacy. Please tell us the name of the pharmacy.    You may pick the prescription up at the Pain Clinic (tell us the location) or during a clinic visit with your pain provider    Pain Clinic staff can deliver the prescription to the [facility] pharmacy in the clinic building. Please tell us the location.      We believe regular attendance is key to your success in our program.    Any time you are unable to keep your appointment we ask that you call us at least 24 hours in advance to let us know. This will allow us to  offer the appointment time to another patient.

## 2018-11-20 ENCOUNTER — OFFICE VISIT (OUTPATIENT)
Dept: PALLIATIVE MEDICINE | Facility: CLINIC | Age: 53
End: 2018-11-20
Payer: COMMERCIAL

## 2018-11-20 DIAGNOSIS — M79.18 MYOFASCIAL PAIN: ICD-10-CM

## 2018-11-20 DIAGNOSIS — M54.50 CHRONIC RIGHT-SIDED LOW BACK PAIN WITHOUT SCIATICA: Primary | ICD-10-CM

## 2018-11-20 DIAGNOSIS — M06.9 RHEUMATOID ARTHRITIS, INVOLVING UNSPECIFIED SITE, UNSPECIFIED RHEUMATOID FACTOR PRESENCE: ICD-10-CM

## 2018-11-20 DIAGNOSIS — G89.29 CHRONIC RIGHT-SIDED LOW BACK PAIN WITHOUT SCIATICA: Primary | ICD-10-CM

## 2018-11-20 DIAGNOSIS — M25.562 CHRONIC PAIN OF LEFT KNEE: ICD-10-CM

## 2018-11-20 DIAGNOSIS — M45.9 ANKYLOSING SPONDYLITIS, UNSPECIFIED SITE OF SPINE (H): ICD-10-CM

## 2018-11-20 DIAGNOSIS — G89.29 CHRONIC PAIN OF LEFT KNEE: ICD-10-CM

## 2018-11-20 PROCEDURE — 96152 HC HEALTH AND BEHAVIOR INTERVENTION, INDIVIDUAL, EACH 15 MINUTES: CPT | Performed by: PSYCHOLOGIST

## 2018-11-20 NOTE — MR AVS SNAPSHOT
After Visit Summary   11/20/2018    Jailene Breen    MRN: [id number]           Patient Information     Date Of Birth          1965        Visit Information        Provider Department      11/20/2018 11:00 AM Padilla Keene LP [facility]        Today's Diagnoses     Chronic right-sided low back pain without sciatica    -  1    Myofascial pain        Rheumatoid arthritis, involving unspecified site, unspecified rheumatoid factor presence (H)        Ankylosing spondylitis, unspecified site of spine (H)        Chronic pain of left knee           Follow-ups after your visit        Your next 10 appointments already scheduled     Dec 03, 2018 10:00 AM CST   Return Visit with Padilla Keene LP   [facility] ([facility])    [address] [id number] [phone]            Feb 01, 2019  9:00 AM CST   Return Visit with JUANITA Ga CNP   [facility] ([facility])    [address] [id number] [phone]              Who to contact     If you have questions or need follow up information about today's clinic visit or your schedule please contact [facility] directly at [phone].  Normal or non-critical lab and imaging results will be communicated to you by MyChart, letter or phone within 4 business days after the clinic has received the results. If you do not hear from us within 7 days, please contact the clinic through Squirrlyhart or phone. If you have a critical or abnormal lab result, we will notify you by phone as soon as possible.  Submit refill requests through JethroData or call your pharmacy and they will forward the refill request to us. Please allow 3 business days for your refill to be completed.          Additional Information About Your Visit        SquirrlyharNGDATA Information     JethroData gives you secure access to your electronic health  record. If you see a primary care provider, you can also send messages to your care team and make appointments. If you have questions, please call your primary care clinic.  If you do not have a primary care provider, please call [phone] and they will assist you.        Care EveryWhere ID     This is your Care EveryWhere ID. This could be used by other organizations to access your [facility] medical records  [id number]         Blood Pressure from Last 3 Encounters:   11/16/18 138/85   11/09/18 158/80   09/20/18 (!) 143/97    Weight from Last 3 Encounters:   11/16/18 120.2 kg (265 lb)   09/14/18 120.9 kg (266 lb 9.6 oz)   08/14/18 120.2 kg (265 lb)              We Performed the Following     HEALTH & BEHAVIOR ASSESS, INITIAL, CHERELLE 15MIN PHD        Primary Care Provider Office Phone # Fax #    [place] JASON Meyer [phone] [address] [facility] [address]        Equal Access to Services     [facility]: Hadii aad ku hadasho Soomaali, waaxda luqadaha, qaybta kaalmada adeegyada, waxay idiin haytariqn lilia davis . So [facility] [phone].    ATENCIÓN: Si habla español, tiene a carrington disposición servicios gratuitos de asistencia lingüística. Llame al [phone].    We comply with applicable federal civil rights laws and [place] laws. We do not discriminate on the basis of race, color, national origin, age, disability, sex, sexual orientation, or gender identity.            Thank you!     Thank you for choosing [facility] [place]  for your care. Our goal is always to provide you with excellent care. Hearing back from our patients is one way we can continue to improve our services. Please take a few minutes to complete the written survey that you may receive in the mail after your visit with us. Thank you!             Your Updated Medication List - Protect others around you: Learn how to safely use, store and throw away your medicines at www.disposemymeds.org.           This list is accurate as of 11/20/18 11:59 PM.  Always use your most recent med list.                   Brand Name Dispense Instructions for use Diagnosis    albuterol 108 (90 Base) MCG/ACT inhaler    PROAIR HFA/PROVENTIL HFA/VENTOLIN HFA    1 Inhaler    Inhale 2 puffs into the lungs every 6 hours as needed for shortness of breath / dyspnea    SOB (shortness of breath)       buPROPion 150 MG 12 hr tablet    WELLBUTRIN SR    180 tablet    Take 1 tablet (150 mg) by mouth 2 times daily    Anxiety, Tobacco abuse       clonazePAM 1 MG tablet    klonoPIN    30 tablet    Take 0.5-1 tablets (0.5-1 mg) by mouth 2 times daily as needed for anxiety    JEFF (generalized anxiety disorder)       cyclobenzaprine 10 MG tablet    FLEXERIL    60 tablet    Take 0.5-1 tablets (5-10 mg) by mouth 3 times daily as needed for muscle spasms Caution sedation    Back muscle spasm, Myofascial pain       folic acid 1 MG tablet    FOLVITE     Reported on 5/19/2017        guaiFENesin-codeine 100-10 MG/5ML Soln solution    ROBITUSSIN AC    120 mL    Take 5 mLs by mouth every 4 hours as needed for cough    Cough       HUMIRA PEN 40 MG/0.8ML pen kit   Generic drug:  adalimumab           IBUPROFEN PO      Take 800 mg by mouth every 6 hours as needed for moderate pain Reported on 5/19/2017        ketoconazole 2 % shampoo    NIZORAL    120 mL    Apply to the affected area and wash off after 5 minutes.use until skin condition is better    Seborrheic dermatitis       methotrexate 2.5 MG tablet CHEMO      6 tablets Reported on 4/3/2017        * nicotine polacrilex 2 MG gum    EQ NICOTINE POLACRILEX    170 tablet    Place 1 each (2 mg) inside cheek every 4 hours as needed for smoking cessation    Tobacco use disorder       * nicotine polacrilex 2 MG lozenge    [employer] NICOTINE POLACRILEX    360 lozenge    Place 1 lozenge (2 mg) inside cheek every hour as needed for smoking cessation    Tobacco use disorder       nortriptyline 50 MG capsule    PAMELOR    90  capsule    Take 1 capsule (50 mg) by mouth At Bedtime    Anxiety, Other chronic pain       oxyCODONE 5 MG tablet    ROXICODONE    45 tablet    Take 1 tablet every 6-8 hours as needed for pain, max of 2 tabs per day but cannot take 2 tabs every day.  Okay to fill on 12/6/2018 and start on 12/9/2018. To last 30 days. No further early refills    Chronic pain of left knee, Chronic bilateral low back pain with right-sided sciatica       predniSONE 20 MG tablet    DELTASONE    5 tablet    Take 1 tablet (20 mg) by mouth daily    Acute bronchitis, unspecified organism, Acute recurrent maxillary sinusitis       sertraline 50 MG tablet    ZOLOFT    90 tablet    Take 1 tablet (50 mg) by mouth daily    JEFF (generalized anxiety disorder)       * Notice:  This list has 2 medication(s) that are the same as other medications prescribed for you. Read the directions carefully, and ask your doctor or other care provider to review them with you.

## 2018-11-24 ENCOUNTER — TELEPHONE (OUTPATIENT)
Dept: PALLIATIVE MEDICINE | Facility: CLINIC | Age: 53
End: 2018-11-24

## 2018-12-10 ENCOUNTER — MYC REFILL (OUTPATIENT)
Dept: FAMILY MEDICINE | Facility: CLINIC | Age: 53
End: 2018-12-10

## 2018-12-10 DIAGNOSIS — F41.1 GAD (GENERALIZED ANXIETY DISORDER): ICD-10-CM

## 2018-12-10 NOTE — TELEPHONE ENCOUNTER
Requested Prescriptions   Pending Prescriptions Disp Refills     clonazePAM (KLONOPIN) 1 MG tablet        Last Written Prescription Date:  09/14/18  Last Fill Quantity: 30,   # refills: 1  Last Office Visit: 09/14/18-Yobany  Future Office visit:    Next 5 appointments (look out 90 days)    Feb 01, 2019  9:00 AM CST  Return Visit with JUANITA Ga CNP  [facility] [place] ([place] [facility]) [address] [address]  [place] [id number] [phone]           Routing refill request to provider for review/approval because:  Drug not on the [facility], P or  Health refill protocol or controlled substance 30 tablet 1     Sig: Take 0.5-1 tablets (0.5-1 mg) by mouth 2 times daily as needed for anxiety    There is no refill protocol information for this order

## 2018-12-11 RX ORDER — CLONAZEPAM 1 MG/1
0.5-1 TABLET ORAL 2 TIMES DAILY PRN
Qty: 30 TABLET | Refills: 1 | Status: SHIPPED | OUTPATIENT
Start: 2018-12-11 | End: 2019-01-29

## 2018-12-11 NOTE — TELEPHONE ENCOUNTER
..Reason for Call:   checking status of medication refill    Detailed comments: declined being transferred to a nurse, sounded very anxious/ran into some problems with his vehicle and had to cancel his appointment for today 12-11;     Phone Number Patient can be reached at: Home number on file [phone] (home)    Best Time: anytime    Can we leave a detailed message on this number? YES    Call taken on 12/11/2018 at 10:05 AM by Nohemi Sanders

## 2018-12-11 NOTE — TELEPHONE ENCOUNTER
Requested Prescriptions   Pending Prescriptions Disp Refills     clonazePAM (KLONOPIN) 1 MG tablet 30 tablet 1     Sig: Take 0.5-1 tablets (0.5-1 mg) by mouth 2 times daily as needed for anxiety    There is no refill protocol information for this order        Routing refill request to provider for review/approval because:  Drug not on the [facility] refill protocol         Chantel Staley RN, BSN

## 2018-12-12 NOTE — TELEPHONE ENCOUNTER
..Reason for Call:   Prescription for the clonazepam    Detailed comments: Patient is wondering when this will be ready, please call, Thankyou    Phone Number Patient can be reached at: Home number on file [phone] (home)    Best Time: anytime    Can we leave a detailed message on this number? YES    Call taken on 12/12/2018 at 8:33 AM by Nohemi Sanders

## 2018-12-13 ENCOUNTER — MYC MEDICAL ADVICE (OUTPATIENT)
Dept: FAMILY MEDICINE | Facility: CLINIC | Age: 53
End: 2018-12-13

## 2018-12-26 ENCOUNTER — MYC MEDICAL ADVICE (OUTPATIENT)
Dept: PALLIATIVE MEDICINE | Facility: CLINIC | Age: 53
End: 2018-12-26

## 2018-12-26 DIAGNOSIS — M54.41 CHRONIC BILATERAL LOW BACK PAIN WITH RIGHT-SIDED SCIATICA: ICD-10-CM

## 2018-12-26 DIAGNOSIS — G89.29 CHRONIC BILATERAL LOW BACK PAIN WITH RIGHT-SIDED SCIATICA: ICD-10-CM

## 2018-12-26 NOTE — TELEPHONE ENCOUNTER
Routed to the nursing pool and to the MA pool to process refill(s).    Cha Molina, RN-BSN  [facility]

## 2018-12-26 NOTE — TELEPHONE ENCOUNTER
Medication refill information reviewed.     Last due:  Okay to fill on 12/6/2018 and start on 12/9/2018. To last 30 days. No further early refills    Due date:  1/8/19    Weaning instructions:  N/A    Prescriptions prepped for review.     Cha Molina, RN-BSN  [facility]

## 2018-12-26 NOTE — TELEPHONE ENCOUNTER
Patient requesting refill(s) of oxyCODONE IR (ROXICODONE) 5 MG tablet    Last picked up from pharmacy on 12/06/18    Pt last seen by prescribing provider on 11/16/18  Next appt scheduled for 02/01/19     checked in the past 6 months? Yes If no, print current report and give to RN    Last urine drug screen date 04/11/18  Current opioid agreement on file (completed within the last year) Yes Date of opioid agreement: 04/11/18    Processing (pick one and delete the others): will call patient when Rx is ready.       Mail to...pharmacy   Pt will  in clinic at.. location  Deliver Rx to..pharmacy    Will route to nursing pool for review and preparation of prescription(s).

## 2018-12-26 NOTE — TELEPHONE ENCOUNTER
I will sign on 1/2/2019 when I return to clinic.  Susana GRANT, RN CNP, FNP  [facility] [place]

## 2019-01-02 RX ORDER — OXYCODONE HYDROCHLORIDE 5 MG/1
TABLET ORAL
Qty: 45 TABLET | Refills: 0 | Status: SHIPPED | OUTPATIENT
Start: 2019-01-02 | End: 2019-01-28

## 2019-01-02 NOTE — TELEPHONE ENCOUNTER
Signed Prescriptions:                        Disp   Refills    oxyCODONE (ROXICODONE) 5 MG tablet         45 tab*0        Sig: Take 1 tab every 6 hours as needed for pain, max 2           tabs/day, can't take 2 tabs everyday. fill on           1/6/19; start on 1/8/19; 30 day script  Authorizing Provider: SUSANA PETTY    Reviewed [place]  January 2, 2019- no concerning fills.    This script was e-prescribed.     Susana Petty APRN, RN CNP, FNP  [facility] [place]

## 2019-01-07 ENCOUNTER — TELEPHONE (OUTPATIENT)
Dept: PALLIATIVE MEDICINE | Facility: CLINIC | Age: 54
End: 2019-01-07

## 2019-01-07 NOTE — TELEPHONE ENCOUNTER
Contact to patient. Left VM of my pending departure from the department, of the option of a referral and the process for setting that up.

## 2019-01-23 NOTE — TELEPHONE ENCOUNTER
Pre-screening Questions for Radiology Injections:    Injection to be done at which interventional clinic site? [facility] [place]    Instruct patient to arrive as directed prior to the scheduled appointment time:    [place]: 30 minutes before      Procedure ordered by Shahzad    Procedure ordered? Lumbar Facet Joint Injection    What insurance would patient like us to bill for this procedure? [employer]      Worker's comp or MVA (motor vehicle accident) -Any injection DO NOT SCHEDULE and route to Yasemin Eldridge.      [employer] insurance - For SI joint injections, DO NOT SCHEDULE and route Yasemin Eldridge.       [employer] - Any injection besides hip/shoulder/knee joint DO NOT SCHEDULE and route to Yasemin Eldridge. She will obtain PA and call pt back to schedule procedure or notify pt of denial.       [employer]-Route to Paul for review      Any chance of pregnancy? NO   If YES, do NOT schedule and route to RN pool    Is an  needed? No     Patient has a drive home? (mandatory) YES: ok    Is patient taking any blood thinners (plavix, coumadin, jantoven, warfarin, heparin, pradaxa or dabigatran )? No   If hold needed, do NOT schedule, route to RN pool     Is patient taking any aspirin products (includes Excedrin and Fiorinal)? No     If more than 325mg/day do NOT schedule; route to RN pool     For CERVICAL procedures, hold all aspirin products for 6 days.     Tell pt that if aspirin product is not held for 6 days, the procedure WILL BE cancelled.      Does the patient have a bleeding or clotting disorder? No     If YES, okay to schedule AND route to RN nurse pool    For any patients with platelet count <100, must be forwarded to provider    Is patient diabetic?  No  If YES, have them bring their glucometer.    Does patient have an active infection or treated for one within the past week? No     Is patient currently taking any antibiotics?  No     For patients on chronic, preventative,  or prophylactic antibiotics, procedures may be scheduled.     For patients on antibiotics for active or recent infection:    Tye Kenney Burton, [place]-antibiotic course must have been completed for 4 days    Is patient currently taking any steroid medications? (i.e. Prednisone, Medrol)  No     For patients on steroid medications:    Tye Kenney Burton, [place]-steroid course must have been completed for 4 days    Reviewed with patient:  If you are started on any steroids or antibiotics between now and your appointment, you must contact us because the procedure may need to be cancelled.  Yes    Is patient actively being treated for cancer or immunocompromised? No  If YES, do NOT schedule and route to RN pool     Are you able to get on and off an exam table with minimal or no assistance? Yes  If NO, do NOT schedule and route to RN pool    Are you able to roll over and lay on your stomach with minimal or no assistance? Yes  If NO, do NOT schedule and route to RN pool     Any allergies to contrast dye, iodine, shellfish, or numbing and steroid medications? No  If YES, route to RN pool AND add allergy information to appointment notes    Allergies: Hydrocodone and Remeron soltab      Has the patient had a flu shot or any other vaccinations within 7 days before or after the procedure.  No     Does patient have an MRI/CT?  YES: MRI  (SI joint, hip injections, lumbar sympathetic blocks, and stellate ganglion blocks do not require an MRI)    Was the MRI done w/in the last 3 years?  Yes    Was MRI done at [facility]? Yes      If not, where was it done? N/A       If [facility] was not done at [facility], [facility] do NOT schedule and route to nursing.  If pt has an imaging disc, the injection may be scheduled but pt has to bring disc to appt. If they show up w/out disc the injection cannot be done    Reminders (please tell patient if applicable):       Instructed pt to arrive 30 minutes early  for IV start if this is for a cervical procedure, ALL sympathetic (stellate ganglion, hypogastric, or lumbar sympathetic block) and all sedation procedures (RFA, spinal cord stimulation trials).  Not Applicable   -IVs are not routinely placed for Dr. Chen cervical cases   -Dr. Holley: IVs for cervical ESIs and cervical TBDs (not CMBBs/facet inj)      If NPO for sedation, informed patient that it is okay to take medications with sips of water (except if they are to hold blood thinners).  Not Applicable   *DO take blood pressure medication if it is prescribed*      If this is for a cervical MAKAYLA, informed patient that aspirin needs to be held for 6 days.   Not Applicable      For all patients not having spinal cord stimulator (SCS) trials or radiofrequency ablations (RFAs), informed patient:    IV sedation is not provided for this procedure.  If you feel that an oral anti-anxiety medication is needed, you can discuss this further with your referring provider or primary care provider.  The Pain Clinic provider will discuss specifics of what the procedure includes at your appointment.  Most procedures last 10-20 minutes.  We use numbing medications to help with any discomfort during the procedure.  Not Applicable      Do not schedule procedures requiring IV placement in the first appointment of the day or first appointment after lunch. Do NOT schedule at 0745, 0815 or 1245.       For patients 85 or older we recommend having an adult stay w/ them for the remainder of the day.       Does the patient have any questions?  NO  Elsa Donovan  [facility]

## 2019-01-28 ENCOUNTER — MYC MEDICAL ADVICE (OUTPATIENT)
Dept: PALLIATIVE MEDICINE | Facility: CLINIC | Age: 54
End: 2019-01-28

## 2019-01-28 DIAGNOSIS — M54.41 CHRONIC BILATERAL LOW BACK PAIN WITH RIGHT-SIDED SCIATICA: ICD-10-CM

## 2019-01-28 DIAGNOSIS — G89.29 CHRONIC BILATERAL LOW BACK PAIN WITH RIGHT-SIDED SCIATICA: ICD-10-CM

## 2019-01-28 NOTE — TELEPHONE ENCOUNTER
Pt informed to contact pharmacy for the flexeril refill.    Routed to the nursing pool and to the MA pool to process refill(s).    Cha Molina, RN-BSN  [facility]

## 2019-01-28 NOTE — TELEPHONE ENCOUNTER
Medication refill information reviewed.     Due date for oxyCODONE (ROXICODONE) 5 MG tablet is 2/7/19     Prescriptions prepped for review.     Will route to provider.     E-prescribe to [facility] pharmacy    Josias Lopez, RN  Care Coordinator   [facility] [place]

## 2019-01-28 NOTE — TELEPHONE ENCOUNTER
Received call from patient requesting refill(s) of oxyCODONE (ROXICODONE) 5 MG tablet    Last picked up from pharmacy on 01/06/19    Pt last seen by prescribing provider on 11/16/18  Next appt scheduled for 02/08/19 office visit - has earlier injection appt scheduled     checked in the past 6 months? Yes If no, print current report and give to RN    Last urine drug screen date 04/11/18  Current opioid agreement on file (completed within the last year) Yes Date of opioid agreement: 04/11/18    Processing (pick one and delete the others):      E-pres[email or web address]      Will route to nursing pool for review and preparation of prescription(s).

## 2019-01-29 DIAGNOSIS — F41.1 GAD (GENERALIZED ANXIETY DISORDER): ICD-10-CM

## 2019-01-29 RX ORDER — OXYCODONE HYDROCHLORIDE 5 MG/1
TABLET ORAL
Qty: 45 TABLET | Refills: 0 | Status: SHIPPED | OUTPATIENT
Start: 2019-01-29 | End: 2019-02-28

## 2019-01-29 RX ORDER — CLONAZEPAM 1 MG/1
0.5-1 TABLET ORAL DAILY PRN
Qty: 30 TABLET | Refills: 1 | Status: SHIPPED | OUTPATIENT
Start: 2019-01-29 | End: 2019-03-27

## 2019-01-29 NOTE — TELEPHONE ENCOUNTER
E-prescription confirmation received from pharmacy. Patient was informed via Firework and message from Susana was conveyed.

## 2019-01-29 NOTE — TELEPHONE ENCOUNTER
Signed Prescriptions:                        Disp   Refills    oxyCODONE (ROXICODONE) 5 MG tablet         45 tab*0        Sig: Take 1 tab every 6 hours as needed for pain, max 2           tabs/day, can't take 2 tabs everyday. fill on           2/5/19; start on 2/7/19; 30 day script  Authorizing Provider: SUSANA PETTY    Reviewed [place]  January 29, 2019- received 10 tabs of hydrocodone from a dentist that was not reported to our office. Please remind patient it is his responsibility to report any and ALL opiates received from outside of my scripts per his CSA with me.    E-prescribed. Please notify patient, can fill on 2/5 and START on 2/7.    Susana Petty APRN, RN CNP, FNP  [facility] [place]

## 2019-01-29 NOTE — TELEPHONE ENCOUNTER
Requested Prescriptions   Pending Prescriptions Disp Refills     clonazePAM (KLONOPIN) 1 MG tablet        Last Written Prescription Date:  12/11/18  Last Fill Quantity: 30,   # refills: 1  Last Office Visit: 09/14/18-Yobany  Future Office visit:    Next 5 appointments (look out 90 days)    Feb 08, 2019  8:30 AM CST  Return Visit with JUANITA Ga CNP  [facility] ([place] [facility]) [address]  [place] [address] [phone]           Routing refill request to provider for review/approval because:  {RX Non-Protocol 30 tablet 1     Sig: Take 0.5-1 tablets (0.5-1 mg) by mouth 2 times daily as needed for anxiety    There is no refill protocol information for this order

## 2019-01-29 NOTE — TELEPHONE ENCOUNTER
Routing refill request to provider for review/approval because:  Drug not on the [facility] refill protocol   Sara Richardson RN

## 2019-01-30 NOTE — TELEPHONE ENCOUNTER
Faxed Rx for the Klonopin to [facility], [phone], right fax confirmed at 9:23 am today, 1/30/19.  Shanelle Glass MA/  For [facility] [employer] and [facility]un

## 2019-01-31 ENCOUNTER — RADIOLOGY INJECTION OFFICE VISIT (OUTPATIENT)
Dept: PALLIATIVE MEDICINE | Facility: CLINIC | Age: 54
End: 2019-01-31
Payer: COMMERCIAL

## 2019-01-31 ENCOUNTER — ANCILLARY PROCEDURE (OUTPATIENT)
Dept: RADIOLOGY | Facility: CLINIC | Age: 54
End: 2019-01-31
Payer: COMMERCIAL

## 2019-01-31 VITALS
RESPIRATION RATE: 16 BRPM | SYSTOLIC BLOOD PRESSURE: 132 MMHG | DIASTOLIC BLOOD PRESSURE: 88 MMHG | OXYGEN SATURATION: 96 % | HEART RATE: 76 BPM

## 2019-01-31 DIAGNOSIS — M45.9 ANKYLOSING SPONDYLITIS, UNSPECIFIED SITE OF SPINE (H): ICD-10-CM

## 2019-01-31 DIAGNOSIS — M47.816 SPONDYLOSIS OF LUMBAR REGION WITHOUT MYELOPATHY OR RADICULOPATHY: ICD-10-CM

## 2019-01-31 DIAGNOSIS — M47.819 FACET ARTHROPATHY: Primary | ICD-10-CM

## 2019-01-31 PROCEDURE — 64494 INJ PARAVERT F JNT L/S 2 LEV: CPT | Mod: RT | Performed by: PSYCHIATRY & NEUROLOGY

## 2019-01-31 PROCEDURE — 64495 INJ PARAVERT F JNT L/S 3 LEV: CPT | Mod: RT | Performed by: PSYCHIATRY & NEUROLOGY

## 2019-01-31 PROCEDURE — 64493 INJ PARAVERT F JNT L/S 1 LEV: CPT | Mod: RT | Performed by: PSYCHIATRY & NEUROLOGY

## 2019-01-31 ASSESSMENT — PAIN SCALES - GENERAL: PAINLEVEL: WORST PAIN (10)

## 2019-01-31 NOTE — PATIENT INSTRUCTIONS
[facility]   Procedure Discharge Instructions    Today you saw:    Dr. Silvia Gamble   You had an:   facet joint injection      Medications used:  Lidocaine  Dexamethasone Omnipaque  Ropivicaine   K          If you were holding your blood thinning medication, please restart taking it: N/A    Be cautious when walking. Numbness and/or weakness in the lower extremities may occur for up to 6-8 hours after the procedure due to effect of the local anesthetic    Do not drive for 6 hours. The effect of the local anesthetic could slow your reflexes.     You may resume your regular activities after 24 hours    Avoid strenuous activity for the first 24 hours    You may shower, however avoid swimming, tub baths or hot tubs for 24 hours following your procedure    You may have a mild to moderate increase in pain for several days following the injection.    It may take up to 14 days for the steroid medication to start working although you may feel the effect as early as a few days after the procedure.       You may use ice packs for 10-15 minutes, 3 to 4 times a day at the injection site for comfort    Do not use heat to painful areas for 6 to 8 hours. This will give the local anesthetic time to wear off and prevent you from accidentally burning your skin.     You may use anti-inflammatory medications (such as Ibuprofen or Aleve or Advil) or Tylenol for pain control if necessary    Possible side effects of steroids that you may experience include flushing, elevated blood pressure, increased appetite, mild headaches and restlessness.  All of these symptoms will get better with time.    If you experience any of the following, call the Pain Clinic during work hours at [phone] or the Provider Line after hours at [phone]:  -Fever over 100 degree F  -Swelling, bleeding, redness, drainage, warmth at the injection site  -Progressive weakness or numbness in your legs   -Unusual new onset of pain that is  not improving

## 2019-01-31 NOTE — PROGRESS NOTES
Pre procedure Diagnosis: facet arthropathy   Post procedure Diagnosis: Same  Procedure performed: right L2/3, L3/4, L4/5 facet joint injections  Anesthesia: none  Complications: none  Operators: Ashlyn Gamble MD     Indications:   Jailene Breen is a 53 year old male was sent by Susana Pike NP for facet joint injection.  They have a history of right low back pain.  radiofrequency ablation has been helpful but pain has been worse as he was off meds due to illness.  Exam shows pain with extension/rotation and extension, tenderness to palpation over lumbar paraspinals  and they have tried conservative treatment including medications.    Options/alternatives, benefits and risks were discussed with the patient including bleeding, infection, flared pain, tissue trauma, exposure to radiation, reaction to medications including seizure, spinal cord injury, paralysis, weakness, numbness and headache.   Questions were answered to his satisfaction and he agrees to proceed. Voluntary informed consent was obtained and signed.     Vitals were reviewed: Yes  Allergies were reviewed:  Yes   Medications were reviewed:  Yes   Pre-procedure pain score: 10/10    Procedure:  After getting informed consent, patient was brought into the procedure suite and was placed in a prone position on the procedure table.   A Pause for the Cause was performed.  Patient was prepped and draped in sterile fashion.     Under AP fluoroscopic guidance the L2/3, L3/4, L4/5, facet joints on the right side were identified, and the C-arm was rotated obliquely to the affected side to open the joint space. A total of 4 ml of 1% lidocaine was injected at the needle entry point and needle tract. Then a 22 gauge 3.5 inch quincke type spinal needle was inserted and advanced under fluoroscopic guidance targeting the superior articular pillar of each joint. Once the needle made a contact with SAP, it was rotated and was then advanced into the joint.    AP  fluoroscopic views were obtained to confirm the needle placement. Then, contrast was injected after negative aspiration for heme and CSF in each joint, confirming appropriate placement.  A total of 1ml of Omnipaque was used, and 9ml was wasted.     The injection was then accomplished using a solution containing 2ml of 0.5% bupivacaine mixed with 15mg of dexamethasone, divided between the 3 joints. The needles were removed.     Hemostasis was achieved, the area was cleaned, and bandaids were placed when appropriate.  The patient tolerated the procedure well, and was taken to the recovery room.    Images were saved to PACS.    Post-procedure pain score: 7/10  Follow-up includes:   -f/u phone call in one week  -f/u with referring provider    Ashlyn Gamble MD  [facility]

## 2019-01-31 NOTE — NURSING NOTE
Pre-procedure Intake    Have you been fasting? NA    If yes, for how long? No     Are you taking a prescribed blood thinner such as coumadin, Plavix, Xarelto?    No    If yes, when did you take your last dose? No     Do you take aspirin?  No    If cervical procedure, have you held aspirin for 6 days?   No     Do you have any allergies to contrast dye, iodine, steroid and/or numbing medications?  NO    Are you currently taking antibiotics or have an active infection?  NO    Have you had a fever/elevated temperature within the past week? NO    Are you currently taking oral steroids? NO    Do you have a ? Yes       Are you pregnant or breastfeeding?  Not Applicable    Are the vital signs normal?  Yes  Nicolasa Krueger MA

## 2019-01-31 NOTE — NURSING NOTE
.Discharge Information    IV Discontiued Time:  NA    Amount of Fluid Infused:  NA    Discharge Criteria = When patient returns to baseline or as per MD order    Consciousness:  Pt is fully awake    Circulation:  BP +/- 20% of pre-procedure level    Respiration:  Patient is able to breathe deeply    O2 Sat:  Patient is able to maintain O2 Sat >92% on room air    Activity:  Moves 4 extremities on command    Ambulation:  Patient is able to stand and walk or stand and pivot into wheelchair    Dressing:  Clean/dry or No Dressing    Notes:   Discharge instructions and AVS given to patient    Patient meets criteria for discharge?  YES    Admitted to PCU?  No    Responsible adult present to accompany patient home?  Yes    Signature/Title:    Josias Lopez  RN Care Coordinator  [facility]

## 2019-02-01 ENCOUNTER — TELEPHONE (OUTPATIENT)
Dept: PALLIATIVE MEDICINE | Facility: CLINIC | Age: 54
End: 2019-02-01

## 2019-02-01 NOTE — TELEPHONE ENCOUNTER
Pt is in extreme pain. He took pain meds and some Ibuprofen and nothing has helped.     Shauna Babin  [facility]

## 2019-02-01 NOTE — TELEPHONE ENCOUNTER
Patient had facet joint injections 1/31/19 and reports he is having a pain Flare. He reports his prescription for oxycodone 5 mg reads he can take 2 tabs daily but not every day. He is hoping he can fill his prescription early as he will not have enough to last until 2/7/19 with this pain.     He reports he understands the pain flare is temporary.     Will route to Susana to review request for early fill.    KERLINE RivasN, RN  Care Coordinator  [facility] [place]

## 2019-02-08 NOTE — TELEPHONE ENCOUNTER
The request is no longer valid as he has filled his regularly monthly amount.    In general, we do NOT fill opiate scripts early after any injection, unless it is an RFA, and even then, the interventional providers will give the patient a short-script of additional pain medication rather than having a patient take more of their regular medication.    Susana GRANT, RN CNP, FNP  [facility]

## 2019-02-28 ENCOUNTER — MYC MEDICAL ADVICE (OUTPATIENT)
Dept: PALLIATIVE MEDICINE | Facility: CLINIC | Age: 54
End: 2019-02-28

## 2019-02-28 DIAGNOSIS — M62.830 BACK MUSCLE SPASM: ICD-10-CM

## 2019-02-28 DIAGNOSIS — M54.41 CHRONIC BILATERAL LOW BACK PAIN WITH RIGHT-SIDED SCIATICA: ICD-10-CM

## 2019-02-28 DIAGNOSIS — M79.18 MYOFASCIAL PAIN: ICD-10-CM

## 2019-02-28 DIAGNOSIS — G89.29 CHRONIC BILATERAL LOW BACK PAIN WITH RIGHT-SIDED SCIATICA: ICD-10-CM

## 2019-02-28 RX ORDER — CYCLOBENZAPRINE HCL 10 MG
5-10 TABLET ORAL 3 TIMES DAILY PRN
Qty: 60 TABLET | Refills: 2 | Status: SHIPPED | OUTPATIENT
Start: 2019-02-28 | End: 2019-08-30

## 2019-02-28 RX ORDER — OXYCODONE HYDROCHLORIDE 5 MG/1
TABLET ORAL
Qty: 45 TABLET | Refills: 0 | Status: SHIPPED | OUTPATIENT
Start: 2019-02-28 | End: 2019-04-01

## 2019-02-28 NOTE — TELEPHONE ENCOUNTER
Routed to the nursing pool and to the MA pool to process refill(s).    Cha Molina, RN-BSN  [facility]

## 2019-02-28 NOTE — TELEPHONE ENCOUNTER
Received call from patient requesting refill(s) of oxyCODONE (ROXICODONE) 5 MG tablet    Last picked up from pharmacy on 2/5/19    cyclobenzaprine (FLEXERIL) 10 MG tablet  11/14/18    Pt last seen by prescribing provider on 11/16/18  Next appt scheduled for 3/12/19     checked in the past 6 months? Yes If no, print current report and give to RN    Last urine drug screen date   4/11/18  Current opioid agreement on file (completed within the last year) Yes Date of opioid agreement: 4/11/18    Processing (pick one and delete the others):    [facility]     Nancy Bal MA  [facility]    Will route to nursing pool for review and preparation of prescription(s).

## 2019-02-28 NOTE — TELEPHONE ENCOUNTER
Signed Prescriptions:                        Disp   Refills    cyclobenzaprine (FLEXERIL) 10 MG tablet    60 tab*2        Sig: Take 0.5-1 tablets (5-10 mg) by mouth 3 times daily           as needed for muscle spasms Caution sedation  Authorizing Provider: SUSANA PETTY    oxyCODONE (ROXICODONE) 5 MG tablet         45 tab*0        Sig: Take 1 tab Q 6 hrs prn pain. Max 2 tabs/day but not           Qday. Fill 3/7/19; Start 3/9/19; 30 day script  Authorizing Provider: SUSANA PETTY    E-prescribed.  Please notify patient of  date 3/7/19 and START date 3/9/19  Thanks.    Please add appt note, UDS and CSA due at next appt.   thanks  Susana GRANT, RN CNP, FNP  [facility]

## 2019-02-28 NOTE — TELEPHONE ENCOUNTER
Medication refill information reviewed.     Last due:  fill on 2/5/19; start on 2/7/19; 30 day script    Due date:  3/9/19    Weaning instructions:  N/A    Prescriptions prepped for review.     Cha Molina RN-BSN  [facility]

## 2019-03-05 ENCOUNTER — MYC MEDICAL ADVICE (OUTPATIENT)
Dept: PALLIATIVE MEDICINE | Facility: CLINIC | Age: 54
End: 2019-03-05

## 2019-03-06 NOTE — TELEPHONE ENCOUNTER
Per patient myChart message:  From: Jamison Breen      Created: 3/5/2019 11:36 AM          Good morning, my health insurance is changing tomorrow and my co-pays are going to be high so I was wondering if you could let me fill my medication today so I could at least save some money for now, I could use all the extra help with that since I'm not working. Thanks for your time and have a great day and see you on the 12thFiona

## 2019-03-06 NOTE — TELEPHONE ENCOUNTER
Current oxycodone script at pt's pharmacy has a refill date of tomorrow, to start on 3/9/19.    Mychart sent to pt:  From: Cha Molina RN      Created: 3/6/2019 1:22 PM        Devante Mancilla we missed your request yesterday.  You will be able to fill the oxycodone tomorrow, to start on 3/9/19    Does that work then?    Cha Molina RN-BSN  [facility]

## 2019-03-12 ENCOUNTER — OFFICE VISIT (OUTPATIENT)
Dept: PALLIATIVE MEDICINE | Facility: CLINIC | Age: 54
End: 2019-03-12
Payer: COMMERCIAL

## 2019-03-12 VITALS
DIASTOLIC BLOOD PRESSURE: 87 MMHG | WEIGHT: 270 LBS | BODY MASS INDEX: 32.87 KG/M2 | SYSTOLIC BLOOD PRESSURE: 134 MMHG | HEART RATE: 84 BPM

## 2019-03-12 DIAGNOSIS — Z79.891 ENCOUNTER FOR LONG-TERM USE OF OPIATE ANALGESIC: ICD-10-CM

## 2019-03-12 DIAGNOSIS — M25.50 MULTIPLE JOINT PAIN: ICD-10-CM

## 2019-03-12 DIAGNOSIS — M25.561 CHRONIC PAIN OF BOTH KNEES: ICD-10-CM

## 2019-03-12 DIAGNOSIS — M54.50 CHRONIC BILATERAL LOW BACK PAIN WITHOUT SCIATICA: ICD-10-CM

## 2019-03-12 DIAGNOSIS — M47.816 SPONDYLOSIS OF LUMBAR REGION WITHOUT MYELOPATHY OR RADICULOPATHY: ICD-10-CM

## 2019-03-12 DIAGNOSIS — G89.29 CHRONIC BILATERAL LOW BACK PAIN WITHOUT SCIATICA: ICD-10-CM

## 2019-03-12 DIAGNOSIS — M25.562 CHRONIC PAIN OF BOTH KNEES: ICD-10-CM

## 2019-03-12 DIAGNOSIS — M54.59 LUMBAR FACET JOINT PAIN: Primary | ICD-10-CM

## 2019-03-12 DIAGNOSIS — G89.29 CHRONIC PAIN OF BOTH KNEES: ICD-10-CM

## 2019-03-12 DIAGNOSIS — M79.18 MYOFASCIAL PAIN: ICD-10-CM

## 2019-03-12 PROCEDURE — 80307 DRUG TEST PRSMV CHEM ANLYZR: CPT | Mod: 90 | Performed by: NURSE PRACTITIONER

## 2019-03-12 PROCEDURE — 99000 SPECIMEN HANDLING OFFICE-LAB: CPT | Performed by: NURSE PRACTITIONER

## 2019-03-12 PROCEDURE — 99214 OFFICE O/P EST MOD 30 MIN: CPT | Performed by: NURSE PRACTITIONER

## 2019-03-12 ASSESSMENT — PAIN SCALES - GENERAL: PAINLEVEL: EXTREME PAIN (9)

## 2019-03-12 NOTE — PATIENT INSTRUCTIONS
PLAN:  1. Make appt with Primary Care Provider to discuss ongoing issues with shortness of breath  2. Medications:   1. No changes to medications  3. Procedures: schedule repeat right lumbar RFA, our office will contact you  4. UDS today, last took oxycodone at least 2 days ago. Using clonazepam daily  5. Signed opiate agreement today  6. Follow-up with me in 8-12 weeks.

## 2019-03-12 NOTE — PROGRESS NOTES
"[facility]    March 12, 2019        Chief complaint: lower  back pain. Bilateral knee pain, hand pain, shoulder pain.       Interval history:  Jailene Breen is a 53 year old male is known to me for   Lumbar spondylosis, pain is worse with extension and rotation indicating a facetogenic component to pain  Lumbar degenerative disc disease  SI joint pain  Ankylosing spondylitis  Myofascial pain  Chronic pain syndrome  PMHx includes: Panic attacks, back pain, arthritis, anxiety, ankylosing spondylitis  PSHx includes: Right knee surgery ×2, left foot surgery right knee arthroscopy        Recommendations/plan at the last visit on 11/16/2018 included:  1. Recommended to increase activity while pacing himself  2. Physical Therapy:  I agree with left knee physical therapy  3. Clinical Health Psychologist: The patient will schedule a follow up with Padilla Keene  to address issues of relaxation, behavioral change, coping style, and other factors important to improvement.    4. Diagnostic Studies:  none  5. Medication Management:    1. Continue oxycodone 5mg BID PRN  2. Continue Flexeril 5-10mg TID PRN   6. Further procedures recommended: Patient to schedule lumbar facet joint steroid injections, office to contact patient.  7. Patient to consider purchase of a Happy Light, can be helpful for mood in the darker months of the year  8. Recommendations to PCP: see above  9. Follow up: 8-12 weeks      Since his last visit, Jailene Breen reports:  -states he has been having issues breathing, has been taking clonazepam but it isn't really helping  -states he had pulmonary function testing done back in August 2018, reports this was \"normal\"  -he does not feel that this breathing issue is anxiety-related.   -he has not been seen by Primary Care Provider since September 2018  -tried albuterol, not helpful  -has been struggling with pain as has not been able to take his pain medication as he has been using " "clonazepam recently  -feeling markedly worse due to having to shovel so much snow this winter  -had some severe pain about a week ago, iced it and used his medication and this got better.   -has been off of Humira as well as refill was delayed  -axial low back pain is worse with extension and rotation. Last RFA done in July 2018 provided over 6 months of good relief.   -working on getting SSDI due to pain  -back in college. stressful      At this point, the patient's participation with our multidisciplinary team includes:  The patient has been compliant with the program.    PT - has seen Cyndee White, none recently  Health Psych - seeing Padilla Keene, last on 11/6/2018.  Padilla Keene is no longer with our clinic.       Pain scores:  Pain intensity on average is 7-8 on a scale of 0-10.    Range is 5-10/10.   Pain right now is 9/10.  Pain is described as \"penetrating, stabbing, and miserable.\"  Pain is constant in nature    Current pain relevant medications:   -nortriptyline 50mg at bedtime (helpful)  -humira 40mg twice monthly(helpful--off of this at present time)  -ibuprofen 800mg TID PRN (using 3 times daily-helpful)  -Tylenol 500mg using 1000mg TID (unsure if helpful)  -Maxalt 10mg PRN migraine (helpful for migraine--he does have visual aura)  -Oxycodone 5mg BID PRN (taking 1-2 tabs per day)   -Flexeril 10mg TID PRN (helpful)      Other pertinent medications:  -clonazepam 1mg tab, may take 0.5-1mg BID PRN anxiety (helpful, last few days has been using twice per day)  -methotrexate 2.5mg taking 6 tabs once per week    Previous Medications:  OPIATES: Oxycodone (helpful), Fentanyl (100mcg/hr patch helpful), hydrocodone (itching), Tramadol (not helpful)   NSAIDS: ibuprofen (not helpful), Aleve (not helpful)  MUSCLE RELAXANTS: methocarbamol (somewhat helpful), Flexeril (somewhat helpful), tizanidine (unsure if helpful), metaxalone (unsure), SOMA (helpful)  ANTI-MIGRAINE MEDS: Maxalt (helpful for migraine), " Imitrex injection (somewhat helpful)  ANTI-DEPRESSANTS: Prozac (helpful), Cymbalta (felt weird on it), nortriptyline (unsure if helpful), Buspar (unsure), Remeron (blacked out), bupropion (not helpful for smoking cessation)  SLEEP AIDS: Ambien (helpful)  ANTI-CONVULSANTS: gabapentin (felt odd on this medication, unsure of dose), Lyrica (not helpful for 4 months, unsure of dose), Topamax (not helpful, he felt weird on it)   TOPICALS: Lidocaine patches (not helpful), Voltaren gel (not helpful)  Other meds: Tylenol (unsure if helpful)        Other treatments have included:  Jailene Breen has been seen at a pain clinic in the past.  [facility]  PT: tried, not helpful  Chiropractic: tried, not helpful  Acupuncture: none  TENs Unit: tried, helpful     Injections:   -has had injections at outside clinics  -has had epidural injections for low back pain (one was helpful)  -he has had lumbar medial branch blocks at [facility] and he was going to have lumbar radiofrequency ablation (did not do this due to cost)  -4/3/2017 right LMBBs with Dr. Ashlyn Gamble (helpful)  -4/26/2017 right LMBBs with Dr. Ashlyn Gamble (helpful)  -5/31/2017 right L3, L4, L5 RFA with Dr. Ashlyn Gamble (good relief for over 6 months)  -3/15/2018 right L5 transforaminal MAKAYLA with Dr. Ashlyn Gamble (not helpful)  -5/10/2018 trigger point injections with Dr. Ashlyn Gamble(somewhat helpful).  -7/12/2018 Right L3, L4, L5 RFA with Dr. Ashlyn Gamble (helpful).  -9/20/2018 Left piriformis injections, bilateral gluteal trigger point with Dr. Gamble (helpful).  -1/31/2019 right L2-3, L3-4 and L4-5 facet joint injections with Dr. Ashlyn Gamble (somewhat helpful)          Side Effects: no side effect  Patient is using the medication as prescribed: YES    Medications:  Current Outpatient Medications   Medication Sig Dispense Refill     albuterol (PROAIR HFA/PROVENTIL HFA/VENTOLIN HFA) 108 (90 Base) MCG/ACT inhaler Inhale 2 puffs into the lungs  every 6 hours as needed for shortness of breath / dyspnea 1 Inhaler 3     clonazePAM (KLONOPIN) 1 MG tablet Take 0.5-1 tablets (0.5-1 mg) by mouth daily as needed for anxiety 30 tablet 1     cyclobenzaprine (FLEXERIL) 10 MG tablet Take 0.5-1 tablets (5-10 mg) by mouth 3 times daily as needed for muscle spasms Caution sedation 60 tablet 2     folic acid (FOLVITE) 1 MG tablet Reported on 5/19/2017       HUMIRA PEN 40 MG/0.8ML pen kit        IBUPROFEN PO Take 800 mg by mouth every 6 hours as needed for moderate pain Reported on 5/19/2017       methotrexate 2.5 MG tablet 6 tablets Reported on 4/3/2017       nicotine polacrilex ([employer] NICOTINE POLACRILEX) 2 MG lozenge Place 1 lozenge (2 mg) inside cheek every hour as needed for smoking cessation 360 lozenge 1     nortriptyline (PAMELOR) 50 MG capsule Take 1 capsule (50 mg) by mouth At Bedtime 90 capsule 1     oxyCODONE (ROXICODONE) 5 MG tablet Take 1 tab Q 6 hrs prn pain. Max 2 tabs/day but not Qday. Fill 3/7/19; Start 3/9/19; 30 day script 45 tablet 0     predniSONE (DELTASONE) 20 MG tablet Take 1 tablet (20 mg) by mouth daily 5 tablet 0     sertraline (ZOLOFT) 50 MG tablet Take 1 tablet (50 mg) by mouth daily 90 tablet 1     buPROPion (WELLBUTRIN SR) 150 MG 12 hr tablet Take 1 tablet (150 mg) by mouth 2 times daily (Patient not taking: Reported on 3/15/2018) 180 tablet 1     guaiFENesin-codeine (ROBITUSSIN AC) 100-10 MG/5ML SOLN solution Take 5 mLs by mouth every 4 hours as needed for cough (Patient not taking: Reported on 3/15/2018) 120 mL 0     ketoconazole (NIZORAL) 2 % shampoo Apply to the affected area and wash off after 5 minutes.use until skin condition is better (Patient not taking: Reported on 3/15/2018) 120 mL 1     nicotine polacrilex (EQ NICOTINE POLACRILEX) 2 MG gum Place 1 each (2 mg) inside cheek every 4 hours as needed for smoking cessation (Patient not taking: Reported on 11/16/2018) 170 tablet 5       Medical History: any changes in medical history  since they were last seen? No    Social History:   Home situation: , has 2 children in college  Occupation/Schooling: currently unemployed, worked as a  (unemployed since 3/1/2017)  Tobacco use: chews tobacco  Alcohol use: none  Drug use: none  History of chemical dependency treatment: none    Is patient a current smoker or tobacco user?  yes  If yes, was cessation counseling offered?  yes        Review of Systems:  ROS is positive as per HPI as well as for  joint pain, arthritis, back pain, depression, anxiety, stress, and is negative for fevers, chills, sweats, or constipation, diarrhea.      Physical Exam:  Vital signs: /87   Pulse 84   Wt 122.5 kg (270 lb)   BMI 32.87 kg/m       Behavioral observations:  Awake, alert, cooperative.     Gait:  normal    Musculoskeletal exam:    Moves well in the exam room  Strength grossly equal throughout  Lumbar flexion 80 degrees  Lumbar extension 25 degrees painful  Lumbar ext/rotation to right is painful  Lumbar ext/rotation to the left is painful    Neuro exam:  SILT in all extremities     Skin/vascular/autonomic:  No suspicious lesions on exposed skin.      Other:  NA    Is the patient hypertensive today? No  Hypertensive on recheck of BP?   N/A  If yes, was patient recommended to see Primary Care Provider in follow up for management of HTN?  N/A        [place] Prescription Monitoring Program:  Reviewed 3/12/19, no issues    DIRE Score for selecting candidates for long term opioid analgesia for chronic pain:  Diagnosis  2  Intractablility  2  Risk    Psychological health  2    Chemical health  2    Reliability  2    Social support  2  Efficacy  2    Total DIRE Score = 14. Note that  7-13 predicts poor outcome (compliance and efficacy) from opioid prescribing; 14-21 predicts good outcome (compliance and efficacy)  from opioid prescribing.      Assessment:   1. Lumbar facet joint pain     2. Lumbosacral spondylosis without myelopathy   3. Chronic  bilateral low back pain without sciatica  4. Myofascial pain  5. Bilateral chronic knee pain  6. Multiple joint pain  7. Chronic pain syndrome  8. PMHx includes: Panic attacks, back pain, arthritis, anxiety, ankylosing spondylitis  9. PSHx includes: Right knee surgery ×2, left foot surgery right knee arthroscopy      Plan:   1. Recommended to increase activity while pacing himself  2. Physical Therapy:  I agree with left knee physical therapy  3. Clinical Health Psychologist: none  4. Diagnostic Studies:  none  5. Medication Management:    1. Continue oxycodone 5mg BID PRN  2. Continue Flexeril 5-10mg TID PRN   6. Further procedures recommended: Patient to schedule repeat lumbar radiofrequency ablation, office to contact patient.  7. UDS today, last took oxycodone 2 days ago. States using clonazepam daily  8. Re-signed opiate agreement   9. Recommendations to PCP: see above  10. Follow up: 8-12 weeks  11. Patient strongly encouraged to see Primary Care Provider re: his ongoing shortness of breath    Total time spent face to face was 25 minutes and more than 50% of face to face time was spent in counseling and/or coordination of care regarding the diagnosis and recommendations above.      Susana GRANT, RN CNP, FNP  [facility] [place]

## 2019-03-12 NOTE — LETTER
[facility]  03/12/19    Patient: Jailene Breen  YOB: 1965  Medical Record Number: [id number]                                                                  Opioid / Opioid Plus Controlled Substance Agreement    I understand that my care provider has prescribed an opioid (narcotic) controlled substance to help manage my condition(s). I am taking this medicine to help me function or work. I know this is strong medicine, and that it can cause serious side effects. Opioid medicine can be sedating, addicting and may cause a dependency on the drug. They can affect my ability to drive or think, and cause depression. They need to be taken exactly as prescribed. Combining opioids with certain medicines or chemicals (such as cocaine, sedatives and tranquilizers, sleeping pills, meth) can be dangerous or even fatal. Also, if I stop opioids suddenly, I may have severe withdrawal symptoms. Last, I understand that opioids do not work for all types of pain nor for all patients. If not helpful, I may be asked to stop them.        The risks, benefits, and side effects of these medicine(s) were explained to me. I agree that:    1. I will take part in other treatments as advised by my care team. This may be psychiatry or counseling, physical therapy, behavioral therapy, group treatment or a referral to a pain clinic. I will reduce or stop my medicine when my care team tells me to do so.  2. I will take my medicines as prescribed. I will not change the dose or schedule unless my care team tells me to. There will be no refills if I  run out early.   I may be contactedwithout warning and asked to complete a urine drug test or pill count at any time.   3. I will keep all my appointments, and understand this is part of the monitoring of opioids. My care team may require an office visit for EVERY opioid/controlled substance refill. If I miss appointments or don t follow instructions, my care  team may stop my medicine.  4. I will not ask other providers to prescribe controlled substances, and I will not accept controlled substances from other people. If I need another prescribed controlled substance for a new reason, I will tell my care team within 1 business day.  5. I will use one pharmacy to fill all of my controlled substance prescriptions, and it is up to me to make sure that I do not run out of my medicines on weekends or holidays. If my care team is willing to refill my opioid prescription without a visit, I must request refills only during office hours, refills may take up to 3 days to process, and it may take up to 5 to 7 days for my medicine to be mailed and ready at my pharmacy. Prescriptions will not be mailed anywhere except my pharmacy.        [id number]  Rev 12/18         Registration to scan to EHR                             Page 1 of 2               Controlled Substance Agreement Opioid        [facility]  03/12/19  Patient: Jailene Breen  YOB: 1965  Medical Record Number: [id number]                                                                  6. I am responsible for my prescriptions. If the medicine/prescription is lost or stolen, it will not be replaced. I also agree not to share controlled substance medicines with anyone.  7. I agree to not use ANY illegal or recreational drugs. This includes marijuana, cocaine, bath salts or other drugs. I agree not to use alcohol unless my care team says I may.          I agree to give urine samples whenever asked. If I don t give a urine sample, the care team may stop my medicine.    8. If I enroll in the [place] Medical Marijuana program, I will tell my care team. I will also sign an agreement to share my medical records with my care team.   9. I will bring in my list of medicines (or my medicine bottles) each time I come to the clinic.   10. I will tell my care team right away if I become pregnant or  have a new medical problem treated outside of my regular clinic.  11. I understand that this medicine can affect my thinking and judgment. It may be unsafe for me to drive, use machinery and do dangerous tasks. I will not do any of these things until I know how the medicine affects me. If my dose changes, I will wait to see how it affects me. I will contact my care team if I have concerns about medicine side effects.    I understand that if I do not follow any of the conditions above, my prescriptions or treatment may be stopped.      I agree that my provider, clinic care team, and pharmacy may work with any city, state or federal law enforcement agency that investigates the misuse, sale, or other diversion of my controlled medicine. I will allow my provider to discuss my care with or share a copy of this agreement with any other treating provider, pharmacy or emergency room where I receive care. I agree to give up (waive) any right of privacy or confidentiality with respect to these consents.     I have read this agreement and have asked questions about anything I did not understand.      ________________________________________________________________________  Patient signature - Date/Time -  Jailene Breen                                      ________________________________________________________________________  Witness signature                                                            ________________________________________________________________________  Provider signature - JUANITA Ga CNP      [id number]  Rev 12/18         Registration to scan to EHR                         Page 2 of 2                   Controlled Substance Agreement Opioid           Page 1 of 2  Opioid Pain Medicines (also known as Narcotics)  What You Need to Know    What are opioids?   Opioids are pain medicines that must be prescribed by a doctor.  They are also known as narcotics.    Examples are:     morphine (MS  Contin, Raven)    oxycodone (Oxycontin)    oxycodone and acetaminophen (Percocet)    hydrocodone and acetaminophen (Vicodin, Norco)     fentanyl patch (Duragesic)     hydromorphone (Dilaudid)     methadone     What do opioids do well?   Opioids are best for short-term pain after a surgery or injury. They also work well for cancer pain. Unlike other pain medicines, they do not cause liver or kidney failure or ulcers. They may help some people with long-lasting (chronic) pain.     What do opioids NOT do well?   Opioids never get rid of pain entirely, and they do not work well for most patients with chronic pain. Opioids do not reduce swelling, one of the causes of pain. They also don t work well for nerve pain.                           For informational purposes only.  Not to replace the advice of your care provider.  Copyright 201 [facility]. All right reserved. [facility] [id number]-Neu 02/18.      Page 2 of 2    Risks and side effects   Talk to your doctor before you start or decide to keep taking one of these medicines. Side effects include:    Lowering your breathing rate enough to cause death    Overdose, including death, especially if taking higher than prescribed doses    Long-term opioid use    Worse depression symptoms; less pleasure in things you usually enjoy    Feeling tired or sluggish    Slower thoughts or cloudy thinking    Being more sensitive to pain over time; pain is harder to control    Trouble sleeping or restless sleep    Changes in hormone levels (for example, less testosterone)    Changes in sex drive or ability to have sex    Constipation    Unsafe driving    Itching and sweating    Feeling dizzy    Nausea, vomiting and dry mouth    What else should I know about opioids?  When someone takes opioids for too long or too often, they become dependent. This means that if you stop or reduce the medicine too quickly, you will have withdrawal symptoms.    Dependence is not the same as  addiction. Addiction is when people keep using a substance that harms their body, their mind or their relations with others. If you have a history of drug or alcohol abuse, taking opioids can cause a relapse.    Over time, opioids don t work as well. Most people will need higher and higher doses. The higher the dose, the more serious the side effects. We don t know the long-term effects of opioids.      Prescribed opioids aren't the best way to manage chronic pain    Other ways to manage pain include:      Ibuprofen or acetaminophen.  You should always try this first.      Treat health problems that may be causing pain.      acupuncture or massage, deep breathing, meditation, visual imagery, aromatherapy.      Use heat or ice at the pain site      Physical therapy and exercise      Stop smoking      See a counselor or therapist                                                  People who have used opioids for a long time may have a lower quality of life, worse depression, higher levels of pain and more visits to doctors.    Never share your opioids with others. Be sure to store opioids in a secure place, locked if possible.Young children can easily swallow them and overdose.     You can overdose on opioids.  Signs of overdose include decrease or loss of consciousness, slowed breathing, trouble waking and blue lips.  If someone is worried about overdose, they should call 911.    If you are at risk for overdose, you may get naloxone (Narcan, a medicine that reverses the effects of opioids.  If you overdose, a friend or family member can give you Narcan while waiting for the ambulance.  They need to know the signs of overdose and how to give Narcan.    While you're taking opioids:    Don't use alcohol or street drugs. Taking them together can cause death.    Don't take any of these medicines unless your doctor says its okay.  Taking these with opioids can cause death.    Benzodiazepines (such as lorazepam         or  diazepam)    Muscle relaxers (such as cyclobenzaprine)    sleeping pills    other opioids    Safe disposal of opioids  Find your area drug take-back program, your pharmacy mail-back program, buy a special disposal bag (such as Deterra) from your pharmacy or flush them down the toilet.  Use the guidelines at:  www.fda.gov/drugs/resourcesforyou

## 2019-03-14 ENCOUNTER — TELEPHONE (OUTPATIENT)
Dept: PALLIATIVE MEDICINE | Facility: CLINIC | Age: 54
End: 2019-03-14

## 2019-03-14 LAB — PAIN DRUG SCR UR W RPTD MEDS: NORMAL

## 2019-03-14 NOTE — TELEPHONE ENCOUNTER
Order received for Repeat Right Lumbar RFA. Routing for insurance check.      Elana Maldonado    [facility]

## 2019-03-15 ENCOUNTER — MYC MEDICAL ADVICE (OUTPATIENT)
Dept: FAMILY MEDICINE | Facility: CLINIC | Age: 54
End: 2019-03-15

## 2019-03-15 NOTE — TELEPHONE ENCOUNTER
[employer] RFA REQUIREMENTS- NO PA REQUIRED      Routing to review, if patient meets criteria please send to  pool to schedule    REPEAT RFA REQUIREMENTS FOR ALL  Must wait 6 months and experience >50% pain relief following previous RFA.         Yasemin MILLAN    [facility]

## 2019-03-15 NOTE — TELEPHONE ENCOUNTER
Per Susana Pike, APRN CNP's last visit note on 3/12/19:  Last RFA done in July 2018 provided over 6 months of good relief.    Mychart sent to pt:  Devante Swift.  For insurance coverage they want to know what % of relief you received from the last radiofrequency ablation done on 7/2018.    Cha Molina, RN-BSN  [facility]

## 2019-03-25 NOTE — TELEPHONE ENCOUNTER
Called pt.   Last RFA done in July 2018 provided over 6 months of good relief @ 70%.    Routed to Yasemin.    Cha Molina, RN-BSN  [facility]

## 2019-03-25 NOTE — TELEPHONE ENCOUNTER
Received call from patient who is wondering when he can schedule his RFA. Advised that we need insurance approval first before scheduling.         Elana Maldonado    [facility]

## 2019-03-26 NOTE — TELEPHONE ENCOUNTER
Pre-screening Questions for RFA Procedure      Procedure ordered? Repeat Right Lumbar RFA    What insurance are we billing for this procedure?  [employer]    Has patient had this injection before? Yes:   Any chance of pregnancy? Not Applicable   If YES, do NOT schedule and route to RN pool    Is  Needed?: No  Will patient have a ?  Yes   If pt is given sedation meds, no driving for 24 hours.  Is pt taking a cab or transportation service? NO        If so will need to be accompanied by an adult too (friend/family member) in order for IV sedation to be given.      Per [place] [facility] Policy:  Outpatients are to have responsible adult or family member to accompany them at discharge and drive them home. A service providing medically trained drivers or attendants would be acceptable. Public transportation would not be acceptable unless the patient is accompanied by a responsible adult or family member.    Is patient taking any blood thinners (plavix, coumadin, jantoven, warfarin, heparin, pradaxa or dabigatran )? No   If YES, do NOT schedule, and route to RN pool    Is patient taking any aspirin products? No     If more than 325mg/day do NOT schedule and route to RN pool     For CERVICAL procedures, hold all aspirin products for 6 days.     Does the patient have a bleeding or clotting disorder? No     If YES, it it OKAY to schedule AND route to RN pool    **For any patients with platelet count <100, must be forwarded to provider**    Does patient have an active infection or treated for one within the past week? No   If YES, do NOT schedule and route to RN nurse pool     Is patient currently taking any antibiotics?  No    For patients on chronic, preventative, or prophylactic antibiotics, procedures may be scheduled.     For patients on antibiotics for active or recent infection:    Tye Kenney Burton, [place]-antibiotic course must have been completed for 4 days    Is patient currently taking any  steroid medications? (i.e. Prednisone, Medrol)  No     For patients on steroid medications:    Nikki Loya, Gustavo Gamble [place]itzer-steroid course must have been completed for 4 days    Reviewed with patient:  If you are started on any steroids or antibiotics between now and your appointment, you must contact us because it may affect our ability to perform your procedure.  Yes    Is patient actively being treated for cancer or immunocompromised, including the spleen having been removed? No  If YES, do NOT schedule and route to RN pool     Any history of complications with sedation medications?  NO   If YES, OK to schedule AND route to RN pool     Any history of sleep apnea?  NO   If YES, OK to schedule AND route to RN pool     Any cardiac history?  NO   If YES, OK to schedule AND route to RN pool     Do you have an implanted pacemaker, ICD (implanted cardiac device) or AICD (automatic implanted cardiac device)?  NO    If YES, do NOT schedule AND route to RN pool.     Obtain name of device : N/A      Obtain name of cardiologist: N/A      Do you have an implanted stimulator?  NO    If YES, OK to schedule AND route to nursing.     Instruct patient to bring in the remote to the appointment and it will need to be turned off.  reviewed and not discussed      Does patient have an allergy to contrast dye, iodine or shellfish?  No   If YES, OK to schedule. Route to RN pool AND add allergy information to appointment notes    Are you able to get on and off an exam table with minimal or no assistance? Yes   If NO, do NOT schedule and route to RN pool    Are you able to roll over and lay on your stomach with minimal or no assistance? Yes   If NO, do NOT schedule and route to RN pool    Informed patient that s/he needs to be NPO for 6 hours before procedure?  YES   Informed patient that it is OK to take normal medications with sips of water, especially blood pressure medications, before the procedure and must  hold blood thinners as instructed.  Yes  Informed patient to arrive 30 minutes before procedure time to have an IV inserted.  reviewed   Do NOT schedule at 0745, 0815 or 1245.  reviewed   All radiofrequency ablations are in a 90 minute time slot except genicular radiofrequency ablation those are 60 minute time slot.  Reviewed      Elana Maldonado    [facility]

## 2019-03-27 ENCOUNTER — OFFICE VISIT (OUTPATIENT)
Dept: FAMILY MEDICINE | Facility: CLINIC | Age: 54
End: 2019-03-27
Payer: COMMERCIAL

## 2019-03-27 VITALS
DIASTOLIC BLOOD PRESSURE: 88 MMHG | OXYGEN SATURATION: 97 % | BODY MASS INDEX: 32.88 KG/M2 | WEIGHT: 270.1 LBS | SYSTOLIC BLOOD PRESSURE: 148 MMHG | TEMPERATURE: 97.9 F | HEART RATE: 76 BPM

## 2019-03-27 DIAGNOSIS — M06.9 RHEUMATOID ARTHRITIS INVOLVING MULTIPLE SITES, UNSPECIFIED RHEUMATOID FACTOR PRESENCE: ICD-10-CM

## 2019-03-27 DIAGNOSIS — F41.1 GAD (GENERALIZED ANXIETY DISORDER): Primary | ICD-10-CM

## 2019-03-27 DIAGNOSIS — M45.9 ANKYLOSING SPONDYLITIS, UNSPECIFIED SITE OF SPINE (H): ICD-10-CM

## 2019-03-27 PROCEDURE — 99214 OFFICE O/P EST MOD 30 MIN: CPT | Performed by: PHYSICIAN ASSISTANT

## 2019-03-27 RX ORDER — CLONAZEPAM 1 MG/1
0.5-1 TABLET ORAL DAILY PRN
Qty: 30 TABLET | Refills: 1 | OUTPATIENT
Start: 2019-03-27 | End: 2019-03-27

## 2019-03-27 RX ORDER — CLONAZEPAM 1 MG/1
0.5-1 TABLET ORAL DAILY PRN
Qty: 30 TABLET | Refills: 1 | Status: SHIPPED | OUTPATIENT
Start: 2019-03-27 | End: 2019-06-12

## 2019-03-27 RX ORDER — SERTRALINE HYDROCHLORIDE 100 MG/1
100 TABLET, FILM COATED ORAL DAILY
Qty: 90 TABLET | Refills: 1 | Status: SHIPPED | OUTPATIENT
Start: 2019-03-27 | End: 2019-12-09

## 2019-03-27 ASSESSMENT — PAIN SCALES - GENERAL: PAINLEVEL: EXTREME PAIN (9)

## 2019-03-27 ASSESSMENT — ANXIETY QUESTIONNAIRES
6. BECOMING EASILY ANNOYED OR IRRITABLE: SEVERAL DAYS
GAD7 TOTAL SCORE: 11
3. WORRYING TOO MUCH ABOUT DIFFERENT THINGS: MORE THAN HALF THE DAYS
5. BEING SO RESTLESS THAT IT IS HARD TO SIT STILL: SEVERAL DAYS
IF YOU CHECKED OFF ANY PROBLEMS ON THIS QUESTIONNAIRE, HOW DIFFICULT HAVE THESE PROBLEMS MADE IT FOR YOU TO DO YOUR WORK, TAKE CARE OF THINGS AT HOME, OR GET ALONG WITH OTHER PEOPLE: VERY DIFFICULT
7. FEELING AFRAID AS IF SOMETHING AWFUL MIGHT HAPPEN: SEVERAL DAYS
1. FEELING NERVOUS, ANXIOUS, OR ON EDGE: MORE THAN HALF THE DAYS
2. NOT BEING ABLE TO STOP OR CONTROL WORRYING: MORE THAN HALF THE DAYS

## 2019-03-27 ASSESSMENT — PATIENT HEALTH QUESTIONNAIRE - PHQ9
SUM OF ALL RESPONSES TO PHQ QUESTIONS 1-9: 17
5. POOR APPETITE OR OVEREATING: MORE THAN HALF THE DAYS

## 2019-03-27 NOTE — PROGRESS NOTES
"  SUBJECTIVE:                                                    Jailene Breen is a 53 year old male who presents to clinic today for the following health issues:      Depression and Anxiety Follow-Up    Status since last visit: Worsened over past month, missed some classes and got behind then had a cold,missed more and couldn't take humera and has been holding prednisone for a back injection, ,  leads to spiral of anxiety    Other associated symptoms:pain, anxiety    Complicating factors:     Significant life event: Yes-  Needed to leave school,      Current substance abuse: None    Patient currently on sertraline and klonopin.   Has been following with pain management and rheum. I dont' see a pulm appt in the chart but did have normal stress echo last fall. Hasn't seen pulm yet.   Had stopped taking the nortriptyline as he was forgetting to take it. Reports was seeing psychology through pain clinic but the one he liked left the clinic.   .      PHQ 10/31/2016 5/16/2017 9/14/2018   PHQ-9 Total Score 2 8 13   Q9: Suicide Ideation Not at all Not at all Not at all     JEFF-7 SCORE 12/19/2016 7/28/2017 9/14/2018   Total Score - - -   Total Score - - -   Total Score 10 8 12      PHQ-9  English  PHQ-9   Any Language  JEFF-7  Suicide Assessment Five-step Evaluation and Treatment (SAFE-T)    Amount of exercise or physical activity: 2-3 days/week for an average of less than 15 minutes    Problems taking medications regularly: No    Medication side effects: none    Diet: regular (no restrictions)      reviewed and c/w our chart.            Allergies   Allergen Reactions     Hydrocodone Itching     Remeron Soltab Other (See Comments)     \"Blacked out\" for one week       Past Medical History:   Diagnosis Date     Ankylosing spondylitis (H) 3/1/2017     Anxiety      Arthritis     back, knees     Back pain     possible drug seeking behavior in the past.      Panic attacks        Patient Active Problem List   Diagnosis     " CARDIOVASCULAR SCREENING; LDL GOAL LESS THAN 160     Anxiety     Overweight (BMI 25.0-29.9)     Back pain     Tear meniscus knee, right, initial encounter     Rheumatoid arthritis involving multiple sites, unspecified rheumatoid factor presence (H)     Chronic pain     Right-sided thoracic back pain, unspecified chronicity     JEFF (generalized anxiety disorder)     Panic attack     Syncope, unspecified syncope type     Ankylosing spondylitis (H)     Tobacco use disorder         Current Outpatient Medications on File Prior to Visit:  albuterol (PROAIR HFA/PROVENTIL HFA/VENTOLIN HFA) 108 (90 Base) MCG/ACT inhaler Inhale 2 puffs into the lungs every 6 hours as needed for shortness of breath / dyspnea   cyclobenzaprine (FLEXERIL) 10 MG tablet Take 0.5-1 tablets (5-10 mg) by mouth 3 times daily as needed for muscle spasms Caution sedation   folic acid (FOLVITE) 1 MG tablet Reported on 5/19/2017   HUMIRA PEN 40 MG/0.8ML pen kit    IBUPROFEN PO Take 800 mg by mouth every 6 hours as needed for moderate pain Reported on 5/19/2017   methotrexate 2.5 MG tablet 6 tablets Reported on 4/3/2017   nicotine polacrilex ([employer] NICOTINE POLACRILEX) 2 MG lozenge Place 1 lozenge (2 mg) inside cheek every hour as needed for smoking cessation   oxyCODONE (ROXICODONE) 5 MG tablet Take 1 tab Q 6 hrs prn pain. Max 2 tabs/day but not Qday. Fill 3/7/19; Start 3/9/19; 30 day script   predniSONE (DELTASONE) 20 MG tablet Take 1 tablet (20 mg) by mouth daily   sertraline (ZOLOFT) 50 MG tablet Take 1 tablet (50 mg) by mouth daily     No current facility-administered medications on file prior to visit.     Social History     Tobacco Use     Smoking status: Never Smoker     Smokeless tobacco: Current User     Types: Chew     Tobacco comment: Chew   Substance Use Topics     Alcohol use: No     Comment: none     Drug use: No       History reviewed. No pertinent family history.    ROS:  General: negative for fever  RHEUm hx RA  PSYCH anxiety as  above    OBJECTIVE:  /88 (BP Location: Left arm, Patient Position: Chair, Cuff Size: Adult Large)   Pulse 76   Temp 97.9  F (36.6  C) (Oral)   Wt 122.5 kg (270 lb 1.6 oz)   SpO2 97%   BMI 32.88 kg/m     General:   awake, alert, and cooperative.  NAD.   Head: Normocephalic, atraumatic.  Eyes: Conjunctiva clear,   Heart: Regular rate and rhythm. No murmur.  Lungs: Chest is clear; no wheezes or rales.   Neuro: Alert and oriented - normal speech.    ASSESSMENT:    ICD-10-CM    1. JEFF (generalized anxiety disorder) F41.1 sertraline (ZOLOFT) 100 MG tablet     clonazePAM (KLONOPIN) 1 MG tablet   2. Ankylosing spondylitis, unspecified site of spine (H) M45.9    3. Rheumatoid arthritis involving multiple sites, unspecified rheumatoid factor presence (H) M06.9      I spent a total of 25 minutes in face to face time with > 50% of the visit related to  counseling/care coordination.      PLAN:   Follow up:  1-3 months  Advised about symptoms which might herald more serious problems.

## 2019-03-27 NOTE — PATIENT INSTRUCTIONS
At [facility], we strive to deliver an exceptional experience to you, every time we see you.  If you receive a survey in the mail, please send us back your thoughts. We really do value your feedback.    Based on your medical history, these are the current health maintenance/preventive care services that you are due for (some may have been done at this visit.)  Health Maintenance Due   Topic Date Due     PREVENTIVE CARE VISIT  1965     HIV SCREEN (SYSTEM ASSIGNED)  10/06/1985     LIPID SCREEN Q5 YR MALE (SYSTEM ASSIGNED)  10/06/2002     COLON CANCER SCREEN (SYSTEM ASSIGNED)  10/06/2017     ZOSTER IMMUNIZATION (1 of 2) 10/06/2017         Suggested websites for health information:  Www.[employer] : Up to date and easily searchable information on multiple topics.  Www.medlineplus.gov : medication info, interactive tutorials, watch real surgeries online  Www.familydoctor.org : good info from the Academy of Family Physicians  Www.cdc.gov : public health info, travel advisories, epidemics (H1N1)  Www.aap.org : children's health info, normal development, vaccinations  Www.health.[facility].[place].us : [place] dept of health, public health issues in [place], N1N1    Your care team:                            Family Medicine Internal Medicine   MD Paul Coy MD Shantel Branch-Fleming, MD Katya Georgiev PA-C Nam Ho, MD Pediatrics   HERIBERTO Spears, MD Paula Holbrook CNP, MD Deborah Mielke, MD Kim Thein, APRN CNP      Clinic hours: Monday - Thursday 7 am-7 pm; Fridays 7 am-5 pm.   Urgent care: Monday - Friday 11 am-9 pm; Saturday and Sunday 9 am-5 pm.  Pharmacy : Monday -Thursday 8 am-8 pm; Friday 8 am-6 pm; Saturday and Sunday 9 am-5 pm.     Clinic: [phone]   Pharmacy: [phone]    Patient Education     Anxiety Reaction  Anxiety is the feeling we all get when we think something bad might happen. It is a normal  response to stress and usually causes only a mild reaction. When anxiety becomes more severe, it can interfere with daily life. In some cases, you may not even be aware of what it is you re anxious about. There may also be a genetic link or it may be a learned behavior in the home.  Both psychological and physical triggers cause stress reaction. It's often a response to fear or emotional stress, real or imagined. This stress may come from home, family, work, or social relationships.  During an anxiety reaction, you may feel:    Helpless    Nervous    Depressed    Irritable  Your body may show signs of anxiety in many ways. You may experience:    Dry mouth    Shakiness    Dizziness    Weakness    Trouble breathing    Breathing fast (hyperventilating)    Chest pressure    Sweating    Headache    Nausea    Diarrhea    Tiredness    Inability to sleep    Sexual problems  Home care    Try to locate the sources of stress in your life. They may not be obvious. These may include:  ? Daily hassles of life (such as traffic jams, missed appointments, or car troubles)  ? Major life changes, both good (new baby or job promotion) and bad (loss of job or loss of loved one)  ? Overload: feeling that you have too many responsibilities and can't take care of all of them at once  ? Feeling helpless or feeling that your problems are beyond what you re able to solve    Notice how your body reacts to stress. Learn to listen to your body signals. This will help you take action before the stress becomes severe.    When you can, do something about the source of your stress. (Avoid hassles, limit the amount of change that happens in your life at one time and take a break when you feel overloaded).    Unfortunately, many stressful situations can't be avoided. It is necessary to learn how to better manage stress. There are many proven methods that will reduce your anxiety. These include simple things like exercise, good nutrition, and adequate  rest. Also, there are certain techniques that are helpful:  ? Relaxation  ? Breathing exercises  ? Visualization  ? Biofeedback  ? Meditation  For more information about this, consult your healthcare provider or go to a local bookstore and review the many books and tapes available on this subject.  Follow-up care  If you feel that your anxiety is not responding to self-help measures, contact your healthcare provider or make an appointment with a counselor. You may need short-term psychological counseling and temporary medicine to help you manage stress.  Call 911  Call 911 if any of these happen:    Trouble breathing    Confusion    Drowsiness or trouble wakening    Fainting or loss of consciousness    Rapid heart rate    Seizure    New chest pain that becomes more severe, lasts longer, or spreads into your shoulder, arm, neck, jaw, or back  When to seek medical advice  Call your healthcare provider right away if any of these happen:    Your symptoms get worse    Severe headache not relieved by rest and mild pain reliever  Date Last Reviewed: 10/1/2017    6180-4043 [employer]. [address] [place] [address], [place] [address]. All rights reserved. This information is not intended as a substitute for professional medical care. Always follow your healthcare professional's instructions.

## 2019-03-28 ASSESSMENT — ANXIETY QUESTIONNAIRES: GAD7 TOTAL SCORE: 11

## 2019-03-30 ENCOUNTER — MYC MEDICAL ADVICE (OUTPATIENT)
Dept: PALLIATIVE MEDICINE | Facility: CLINIC | Age: 54
End: 2019-03-30

## 2019-03-30 DIAGNOSIS — M54.41 CHRONIC BILATERAL LOW BACK PAIN WITH RIGHT-SIDED SCIATICA: ICD-10-CM

## 2019-03-30 DIAGNOSIS — G89.29 CHRONIC BILATERAL LOW BACK PAIN WITH RIGHT-SIDED SCIATICA: ICD-10-CM

## 2019-04-01 NOTE — TELEPHONE ENCOUNTER
Patient requesting refill(s) of oxyCODONE (ROXICODONE) 5 MG tablet     Last picked up from pharmacy on 03/07/19    Pt last seen by prescribing provider on 03/12/19  Next appt scheduled for 4/4/19     checked in the past 6 months? Yes If no, print current report and give to RN    Last urine drug screen date 03/12/19  Current opioid agreement on file (completed within the last year) Yes Date of opioid agreement: 3/12/19    Processing (pick one and delete the others):      E-prescribe to [id number] [place] [facility]    Will route to nursing [place] for review and preparation of prescription(s).

## 2019-04-01 NOTE — TELEPHONE ENCOUNTER
Medication refill information reviewed.     Due date for oxyCODONE (ROXICODONE) 5 MG tablet is 4/8/19     Prescriptions prepped for review.     Will route to provider.     E-prescribe to [id number]    Josias Lopez, RN  Care Coordinator   [facility] [place]

## 2019-04-01 NOTE — TELEPHONE ENCOUNTER
Routed to the nursing pool and to the MA pool to process refill(s).    Cha Molina, RN-BSN  [facility]

## 2019-04-02 RX ORDER — OXYCODONE HYDROCHLORIDE 5 MG/1
TABLET ORAL
Qty: 45 TABLET | Refills: 0 | Status: SHIPPED | OUTPATIENT
Start: 2019-04-02 | End: 2019-04-04

## 2019-04-02 NOTE — TELEPHONE ENCOUNTER
E-prescription confirmation received from pharmacy. Patient was informed via [employer]. Message included for patient that reminded him that per his opioid contract, he needs to report any opioids received outside of our clinic in a prompt manner.

## 2019-04-02 NOTE — TELEPHONE ENCOUNTER
Signed Prescriptions:                        Disp   Refills    oxyCODONE (ROXICODONE) 5 MG tablet         45 tab*0        Sig: Take 1 tab Q 6 hrs prn pain. Max 2 tabs/day but not           Qday. Fill 4/6/19; Start 4/8/19; 30 day script  Authorizing Provider: SUSANA PETTY    Reviewed [place]  April 2, 2019- one recent Vertussin AC cough syrup script, not reported to our clinic  NURSING-PLEASE REMIND PATIENT ANY OPIATES RECEIVED OUTSIDE OF OUR CLINIC SHOULD BE PROMPTLY REPORTED.    E-prescribed.  Please notify patient of  date 4/6/19 and START date 4/8/19  Thanks.    Susana Petty APRN, RN CNP, FNP  [facility] [place]

## 2019-04-04 ENCOUNTER — TELEPHONE (OUTPATIENT)
Dept: PALLIATIVE MEDICINE | Facility: CLINIC | Age: 54
End: 2019-04-04

## 2019-04-04 ENCOUNTER — ANCILLARY PROCEDURE (OUTPATIENT)
Dept: RADIOLOGY | Facility: CLINIC | Age: 54
End: 2019-04-04
Attending: PSYCHIATRY & NEUROLOGY
Payer: COMMERCIAL

## 2019-04-04 ENCOUNTER — RADIOLOGY INJECTION OFFICE VISIT (OUTPATIENT)
Dept: PALLIATIVE MEDICINE | Facility: CLINIC | Age: 54
End: 2019-04-04
Payer: COMMERCIAL

## 2019-04-04 VITALS — OXYGEN SATURATION: 95 % | SYSTOLIC BLOOD PRESSURE: 142 MMHG | HEART RATE: 77 BPM | DIASTOLIC BLOOD PRESSURE: 100 MMHG

## 2019-04-04 DIAGNOSIS — M47.819 FACET ARTHROPATHY: Primary | ICD-10-CM

## 2019-04-04 DIAGNOSIS — M54.41 CHRONIC BILATERAL LOW BACK PAIN WITH RIGHT-SIDED SCIATICA: ICD-10-CM

## 2019-04-04 DIAGNOSIS — G89.29 CHRONIC BILATERAL LOW BACK PAIN WITH RIGHT-SIDED SCIATICA: ICD-10-CM

## 2019-04-04 DIAGNOSIS — M47.816 SPONDYLOSIS OF LUMBAR REGION WITHOUT MYELOPATHY OR RADICULOPATHY: ICD-10-CM

## 2019-04-04 PROCEDURE — 64636 DESTROY L/S FACET JNT ADDL: CPT | Mod: RT | Performed by: PSYCHIATRY & NEUROLOGY

## 2019-04-04 PROCEDURE — 64635 DESTROY LUMB/SAC FACET JNT: CPT | Mod: RT | Performed by: PSYCHIATRY & NEUROLOGY

## 2019-04-04 PROCEDURE — 99152 MOD SED SAME PHYS/QHP 5/>YRS: CPT | Performed by: PSYCHIATRY & NEUROLOGY

## 2019-04-04 PROCEDURE — 99153 MOD SED SAME PHYS/QHP EA: CPT | Performed by: PSYCHIATRY & NEUROLOGY

## 2019-04-04 RX ORDER — OXYCODONE HYDROCHLORIDE 5 MG/1
5-10 TABLET ORAL EVERY 6 HOURS PRN
Qty: 100 TABLET | Refills: 0 | Status: SHIPPED | OUTPATIENT
Start: 2019-04-04 | End: 2019-04-23

## 2019-04-04 RX ORDER — KETOROLAC TROMETHAMINE 30 MG/ML
30 INJECTION, SOLUTION INTRAMUSCULAR; INTRAVENOUS ONCE
Status: COMPLETED | OUTPATIENT
Start: 2019-04-04 | End: 2019-04-04

## 2019-04-04 RX ADMIN — KETOROLAC TROMETHAMINE 30 MG: 30 INJECTION, SOLUTION INTRAMUSCULAR; INTRAVENOUS at 10:33

## 2019-04-04 ASSESSMENT — PAIN SCALES - GENERAL: PAINLEVEL: WORST PAIN (10)

## 2019-04-04 NOTE — Clinical Note
New script written for post procedure pain AND remainder of month.  Previous script destroyed.  Also, gave some steroid as he has been off of his meds and inflammatory arthirtis worse.

## 2019-04-04 NOTE — NURSING NOTE
MD Time IN: 0915  Sedation start time:  0920  MD Time OUT:  1000    Medications given: fentanyl 87.5 mcg IV; versed 1.5 mg IV  Intravenous fluids were administered, normal saline 200 cc's.  Sedation Level Achieved:  Minimal sedation    Josias Lopez RN  Care Coordinator   [facility] [place]

## 2019-04-04 NOTE — PATIENT INSTRUCTIONS
[facility]   Radiofrequency Ablation (RFA) Discharge Instructions     Today you saw:     Dr. Silvia Gamble     Medication:  Dexamethasone Bupiivicaine and Lidocaine    You should anticipate procedural pain for up to 2 weeks.   You may receive a prescription for pain medication and you should take this as directed.   -Oxycodone 1-2 tabs every 4-6 hours as needed, max of 6/day x 10 day, wean as able, back to 2/day.  -The maximum recommended total dose of acetaminophen (tylenol) is no more than 3,000mg per day in people without liver problems.  Acetaminophen can be found in various over the counter medications, so check the side of the bottle.       It may take up to 8 weeks to receive relief from the RFA   Follow up with your provider in 6 weeks.   If you received sedation before, during or after your procedure, for the next 24 hours you shall NOT:    -Drive    -Operate machinery    -Drink alcohol    -Sign any legal documents   You may resume your normal diet   You may resume your regular medications after the procedure   If you were holding your blood thinning medication, please restart taking it: N/A  Be cautious with walking. Numbness and/or weakness in the lower extremities may occur for up to 6-8 hours due to effect of local anesthetic  Avoid strenuous activity for the first 24 hours   You may resume your regular activities after 24 hours   You may shower, however no swimming, tub baths or hot tubs for 24 hours following your procedure   You may use ice packs 10-15 minutes three to four times a day at the injection site for comfort   Do not use heat to painful areas for 6 to 8 hours. This will give the local anesthetic time to wear off and prevent you from accidentally burning your skin.   You may use anti-inflammatory medications (such as Ibuprofen or Aleve or Advil) or Tylenol for pain control if necessary   If you experience any of the following, call the Pain Clinic during work hours at  [phone] or the Provider Line after hours at [phone]:   -Fever over 100 degree F    -Swelling, bleeding, redness, drainage, warmth at the injection site    -Progressive weakness or numbness on your legs or arms    -Loss of bowel or bladder function    -Unusual headache that is not relieved by Tylenol    -Unusual new onset of pain that is not improving

## 2019-04-04 NOTE — TELEPHONE ENCOUNTER
BCBS RFA REQUIREMENTS- ALL REQUIRE PA    REPEAT RFA REQUIREMENTS FOR ALL BCBS  Must wait at least 6 months and >50% pain relief following previous RFA.  Prior auth is required.          Patient seen today and signed a self pay waiver.        Faxed completed PA form and supporting documentation for REPEAT LRFA to [employer].  Right fax confirmation          Yasemin MILLAN    [facility]

## 2019-04-04 NOTE — PROGRESS NOTES
Pre procedure Diagnosis: facet arthropathy   Post procedure Diagnosis: Same  Procedure performed: L3, L4, L5/sacral ala lumbar radiofrequency ablation   Anesthesia: moderate sedation with 87.5 mcg Fentanyl IV and 1.5mg midazolam IV  Complications: none  Operators: Ashlyn Gamble MD; Iban Nair MD (pain medicine fellow)    NOTE: Recommend 150mm RF cannulae for any further lumbar procedures    Indications:   Jailene Breen is a 53 year old male who has been seen by myself in clinic multiple times in the past. They have a history of ankylosis spondylitis and spondylolisthesis.  Exam shows tenderness to palpation of the lumbar paraspinous muscles and pain with extension and rotation and they have tried conservative treatment including medications, prior facet joint injections and prior RFA procedures.    Jailene Breen has had prior medial branch RFA and prior facet joint injections with good relief of pain.    Options/alternatives, benefits and risks were discussed with the patient including bleeding, infection, no pain relief, tissue trauma, exposure to radiation, reaction to medications including seizure, spinal cord injury,increased pain after the procedure, weakness, numbness or sensory changes and headache.   We also discussed risks of sedation, including reaction to medications and cardiovascular collapse.    Questions were answered to his satisfaction and he agrees to proceed. Voluntary informed consent was obtained and signed.     Vitals were reviewed: Yes  Allergies were reviewed:  Yes  Medications were reviewed:  Yes  Pre-procedure pain score: 10/10    Procedure:  After getting informed consent, patient was brought into the procedure suite and was placed in a prone position on the procedure table.   A Pause for the Cause was performed.  Patient was prepped and draped in sterile fashion.     Jailene Breen had an IV line placed prior the procedure.  The C-arm was positioned in the RIGHT oblique view  to afford optimal view of the L4-L5 vertebral bodies. Lidocaine 1% was used to anesthetize the skin at each level.  At each level, 10 and 15cm, 20G curved radiofrequency cannula with a 10mm active tip was positioned, overlying the intersection of the transverse process and pedicle at L4 & L5, and was advanced under intermittent fluoroscopy until it contacted the transverse process and notch, and the tip slightly overran that process, just lateral to the mamillary process.  The position of each cannula was verified and optimized in the oblique view and AP views.    In the AP view, another cannula was placed at the sacral alar notch.      Each position was tested for motor and sensory stimulation, and was positioned so that stimulation was negative for stimuli outside the immediate area of the desired lesion.  Sensory stimulation was completed at 50 Hz, with max stimulation up to 0.8V.  Motor stimulation was completed at 2Hz, up to 2.5V.   Bupivacaine mixed 1:1 with lidocaine 1% 1ml was injected, and a 90 second, 80 degree Centigrade lesion was generated.    The needles were then rotated within the pathway of the medial branch, and locations were evaluated with repeat imaging.  Motor testing was again completed, and showed appropriate stimulation.  A second lesion was then generated at each location.    The needles were flushed with the local mixture with 7.5mg dexamethasone as they were withdrawn.      Hemostasis was achieved, the area was cleaned, and bandaids were placed when appropriate.  The patient tolerated the procedure well, and was taken to the recovery room.    Images were saved to PACS.    Sedation start time:  0920  MD Time OUT: 1000    Post-procedure pain score: 2/10  Follow-up includes:   -f/u phone call in one week  -post-procedure pain medications: oxycodone 5-10mg every 6 hours as needed, max of 6/day x 10 days, then max of 2/day- script at pharmacy cancelled, new one sent  -f/u with Susana  Shahzad Gamble MD  [facility]    [facility] Procedure Note        Sedation:      Performed by: Silvia Gamble  Authorized by: Silvia Gamble    Pre-Procedure Assessment done at 0900.    Expected Level:  Moderate Sedation    Indication:  Sedation is required to allow for Right lumbar RFA    Consent obtained from patient after discussing the risks, benefits and alternatives.    PO Intake:  Appropriately NPO for procedure    ASA Class:  Class 2 - MILD SYSTEMIC DISEASE, NO ACUTE PROBLEMS, NO FUNCTIONAL LIMITATIONS.    Mallampati:  Grade 1:  Soft palate, uvula, tonsillar pillars, and posterior pharyngeal wall visible    Lungs: Lungs Clear with good breath sounds bilaterally.     Heart: Normal heart sounds and rate    History and physical reviewed and no updates needed. I have reviewed the lab findings, diagnostic data, medications, and the plan for sedation. I have determined this patient to be an appropriate candidate for the planned sedation and procedure and have reassessed the patient IMMEDIATELY PRIOR to sedation and procedure.      Sedation Post Procedure Summary:    Prior to the start of the procedure and with procedural staff participation, I verbally confirmed the patient s identity using two indicators, relevant allergies, that the procedure was appropriate and matched the consent or emergent situation, and that the correct equipment/implants were available. Immediately prior to starting the procedure I conducted the Time Out with the procedural staff and re-confirmed the patient s name, procedure, and site/side. (The Joint Commission universal protocol was followed.)  Yes      Sedatives: Fentanyl and Midazolam (Versed)    Vital signs, airway, and pulse oximetry were monitored and remained stable throughout the procedure and sedation was maintained until the procedure was complete.  The patient was monitored by staff until sedation discharge criteria were  met.    Patient tolerance: Patient tolerated the procedure well with no immediate complications.    Time of sedation in minutes:  40 minutes from beginning to end of physician one to one monitoring.

## 2019-04-04 NOTE — NURSING NOTE
Pre-procedure Intake    Have you been fasting? Yes    If yes, for how long? More than 6 hours    Are you taking a prescribed blood thinner such as coumadin, Plavix, Xarelto?    No    If yes, when did you take your last dose? NA    Do you take aspirin?  No    If cervical procedure, have you held aspirin for 6 days?   NA    Do you have any allergies to contrast dye, iodine, steroid and/or numbing medications?  NO    Are you currently taking antibiotics or have an active infection?  NO    Have you had a fever/elevated temperature within the past week? NO    Are you currently taking oral steroids? NO    Do you have a ? Yes       Are you pregnant or breastfeeding?  Not Applicable    Are the vital signs normal?  No: Slightly elevated BP      Monica Palma CMA ([facility])

## 2019-04-10 NOTE — TELEPHONE ENCOUNTER
Jasmin from [facility] calling, she states that since this insurance is new for the patient they have to treat this request as a new RFA. She is requesting additional information, she is wanting the MBB's from the first RFA, the relief of the MBB's, imaging and PT or Chiropractor visits.       I have all the information requested, the only concern is that I only see 3 PT visits and they usually would like 4.     Called and left a VM for patient to inform him of where we are at in the process.       Additional information faxed to       Yasemin MILLAN    [facility]

## 2019-04-11 ENCOUNTER — TELEPHONE (OUTPATIENT)
Dept: RADIOLOGY | Facility: CLINIC | Age: 54
End: 2019-04-11

## 2019-04-11 NOTE — TELEPHONE ENCOUNTER
Patient had a radiofrequency ablation (RFA) on 4/4/19.  Called patient for an update.      Left message that we were calling for an update about how s/he was doing after the procedure and that if s/he has any questions or concerns to call the clinic at [phone].

## 2019-04-12 NOTE — TELEPHONE ENCOUNTER
PA approved.  Effective date: 4/4/19  PA reference #: [id number]  Pt. notified:  Patient informed        Yasemin MILLAN    [facility]

## 2019-04-22 ENCOUNTER — MYC MEDICAL ADVICE (OUTPATIENT)
Dept: PALLIATIVE MEDICINE | Facility: CLINIC | Age: 54
End: 2019-04-22

## 2019-04-22 DIAGNOSIS — M54.41 CHRONIC BILATERAL LOW BACK PAIN WITH RIGHT-SIDED SCIATICA: ICD-10-CM

## 2019-04-22 DIAGNOSIS — G89.29 CHRONIC BILATERAL LOW BACK PAIN WITH RIGHT-SIDED SCIATICA: ICD-10-CM

## 2019-04-22 NOTE — TELEPHONE ENCOUNTER
From: Jamison Breen      Created: 4/22/2019 1:14 PM        *-*-*This message has not been handled.*-*-*    Hello, can you please refill my oxycodone. Just giving you a heads up and plenty of time. Thanks        Will forward to MA and Nurse pools to process.    Josias Lopez, RN  Care Coordinator   [facility] [place]

## 2019-04-23 RX ORDER — OXYCODONE HYDROCHLORIDE 5 MG/1
5-10 TABLET ORAL EVERY 6 HOURS PRN
Qty: 60 TABLET | Refills: 0 | Status: SHIPPED | OUTPATIENT
Start: 2019-04-23 | End: 2019-05-14

## 2019-04-23 NOTE — TELEPHONE ENCOUNTER
Patient requesting refill(s) of oxyCODONE (ROXICODONE) 5 MG tablet    Last picked up from pharmacy on 04/0419    Pt last seen by prescribing provider on 3/30/19  Next appt scheduled for no future appointment     checked in the past 6 months? Yes If no, print current report and give to RN    Last urine drug screen date 3/12/19  Current opioid agreement on file (completed within the last year) Yes Date of opioid agreement: 3/12/19    Processing (pick one and delete the others):      E-prescribe to..pharmacy    Will route to nursing pool for review and preparation of prescription(s).

## 2019-04-23 NOTE — TELEPHONE ENCOUNTER
Rx E-prescribed to pharmacy. Patient was notified.     E-Prescribing Status: Receipt confirmed by pharmacy (4/23/2019 11:59 AM CDT)    Nicolasa Krueger MA

## 2019-04-23 NOTE — TELEPHONE ENCOUNTER
Signed Prescriptions:                        Disp   Refills    oxyCODONE (ROXICODONE) 5 MG tablet         60 tab*0        Sig: Take 1-2 tablets (5-10 mg) by mouth every 6 hours as           needed for severe pain Max of 2/day  Fill 5/2/19           and start 5/4/19.  Authorizing Provider: SUSANA PETTY    Reviewed [facility] April 23, 2019- no concerning fills. Did get a script for post-procedural pain from Dr. Gamble as expected.     Susana Petty APRN, RN CNP, FNP  [facility] [place]

## 2019-04-23 NOTE — TELEPHONE ENCOUNTER
Medication refill information reviewed.     Due date for oxyCODONE (ROXICODONE) 5 MG tablet is 5/4/19,     Prescriptions prepped for review.     Will route to provider.     E-prescribe to..pharmacy    Josias Lopez, RN  Care Coordinator   [facility] [place]

## 2019-05-13 ENCOUNTER — MYC MEDICAL ADVICE (OUTPATIENT)
Dept: PALLIATIVE MEDICINE | Facility: CLINIC | Age: 54
End: 2019-05-13

## 2019-05-13 DIAGNOSIS — M54.41 CHRONIC BILATERAL LOW BACK PAIN WITH RIGHT-SIDED SCIATICA: ICD-10-CM

## 2019-05-13 DIAGNOSIS — G89.29 CHRONIC BILATERAL LOW BACK PAIN WITH RIGHT-SIDED SCIATICA: ICD-10-CM

## 2019-05-14 DIAGNOSIS — F17.200 TOBACCO USE DISORDER: ICD-10-CM

## 2019-05-14 RX ORDER — OXYCODONE HYDROCHLORIDE 5 MG/1
5-10 TABLET ORAL EVERY 6 HOURS PRN
Qty: 60 TABLET | Refills: 0 | Status: SHIPPED | OUTPATIENT
Start: 2019-05-14 | End: 2019-06-24

## 2019-05-14 NOTE — TELEPHONE ENCOUNTER
Medication refill information reviewed.     Due date for oxyCODONE (ROXICODONE) 5 MG tablet is 6/3/19     Prescriptions prepped for review.     Will route to provider.     E-prescribe to  [id number] - [facility] [address] [facility] KEITH Lopez RN  Care Coordinator   [facility] [place]

## 2019-05-14 NOTE — TELEPHONE ENCOUNTER
"Requested Prescriptions   Pending Prescriptions Disp Refills     nicotine ([employer] NICOTINE POLACRILEX) 2 MG lozenge      .Last Written Prescription Date:  7/24/18  Last Fill Quantity: 360,  # refills: 1   Last Office Visit with [facility] P or [facility] prescribing provider:  5/2/19   Future Office Visit:      360 lozenge 1     Sig: Place 1 lozenge (2 mg) inside cheek every hour as needed for smoking cessation       Partial Cholinergic Nicotinic Agonist Agents Failed - 5/14/2019 12:06 PM        Failed - Blood pressure under 140/90 in past 12 months     BP Readings from Last 3 Encounters:   04/04/19 (!) 142/100   03/27/19 148/88   03/12/19 134/87                 Passed - Recent (12 mo) or future (30 days) visit within the authorizing provider's specialty     Patient had office visit in the last 12 months or has a visit in the next 30 days with authorizing provider or within the authorizing provider's specialty.  See \"Patient Info\" tab in inbasket, or \"Choose Columns\" in Meds & Orders section of the refill encounter.              Passed - Medication is active on med list        Passed - Patient is 18 years of age or older              Augustin Faarax  [facility]  "

## 2019-05-14 NOTE — TELEPHONE ENCOUNTER
Signed Prescriptions:                        Disp   Refills    oxyCODONE (ROXICODONE) 5 MG tablet         60 tab*0        Sig: Take 1-2 tablets (5-10 mg) by mouth every 6 hours as           needed for severe pain Max of 2/day  Fill 6/1/19           and start 6/3/19.  Authorizing Provider: SUSANA PETTY    Reviewed [place]  May 14, 2019- no concerning fills.    Susana Petty APRN, RN CNP, FNP  [facility] [place]

## 2019-05-14 NOTE — TELEPHONE ENCOUNTER
Routed to the nursing pool and to the MA pool to process refill(s).    Cha Molina, RN-BSN  [facility]

## 2019-05-14 NOTE — TELEPHONE ENCOUNTER
Received call from patient requesting refill(s) of oxyCODONE (ROXICODONE) 5 MG tablet     Last picked up from pharmacy on 05/02/19    Pt last seen by prescribing provider on 03/12/19 - has had injection since  Next appt scheduled for : none     checked in the past 6 months? Yes If no, print current report and give to RN    Last urine drug screen date 03/12/19  Current opioid agreement on file (completed within the last year) Yes Date of opioid agreement: 03/12/19    Processing (pick one and delete the others):      E-prescribe to  [id number] - [place] - [address]      Will route to nursing pool for review and preparation of prescription(s).

## 2019-05-15 RX ORDER — POLYETHYLENE GLYCOL 3350 17 G
2 POWDER IN PACKET (EA) ORAL
Qty: 360 LOZENGE | Refills: 1 | Status: SHIPPED | OUTPATIENT
Start: 2019-05-15 | End: 2020-01-21

## 2019-05-15 NOTE — TELEPHONE ENCOUNTER
Routing refill request to provider for review/approval because:  Labs out of range:  Blood pressure    Amanda Van RN, BSN

## 2019-05-21 ENCOUNTER — TELEPHONE (OUTPATIENT)
Dept: FAMILY MEDICINE | Facility: CLINIC | Age: 54
End: 2019-05-21

## 2019-05-21 NOTE — TELEPHONE ENCOUNTER
Panel Management Review   One phone call and send letter if unable to reach them or [employer] message and send letter if not read after 2 weeks (You will get a message to your inbasket)      BP Readings from Last 1 Encounters:   04/04/19 (!) 142/100    , No results found for: A1C, 3/27/2019    Health Maintenance Due   Topic Date Due     PREVENTIVE CARE VISIT  1965     URINE DRUG SCREEN  1965     DEPRESSION ACTION PLAN  1965     COLONOSCOPY  10/06/1977     HIV SCREENING  10/06/1982     LIPID  10/06/2002     ZOSTER IMMUNIZATION (1 of 2) 10/06/2017        Fail List measure: colon cancer        Patient is due/failing the following:   COLONOSCOPY    Action needed:   none    Type of outreach:    Sent [employer] message.    Questions for provider review:    None                                                                                       Chart routed to n/leonardo Ling

## 2019-05-23 NOTE — TELEPHONE ENCOUNTER
We only manage his chronic pain.  This is an acute injury.  He should f/u with primary care provider or w/ an ortho walk-in clinic.    Called pt and left message relaying the above.    Will keep encounter open for a couple of days in anticipation of patient call back.    Cha Molina RN-BSN  [facility]     23-May-2019 21:35

## 2019-06-10 ENCOUNTER — MYC MEDICAL ADVICE (OUTPATIENT)
Dept: FAMILY MEDICINE | Facility: CLINIC | Age: 54
End: 2019-06-10

## 2019-06-11 ENCOUNTER — MYC MEDICAL ADVICE (OUTPATIENT)
Dept: FAMILY MEDICINE | Facility: CLINIC | Age: 54
End: 2019-06-11

## 2019-06-11 DIAGNOSIS — F41.1 GAD (GENERALIZED ANXIETY DISORDER): ICD-10-CM

## 2019-06-11 NOTE — TELEPHONE ENCOUNTER
Requested Prescriptions   Pending Prescriptions Disp Refills     clonazePAM (KLONOPIN) 1 MG tablet 30 tablet 1     Sig: Take 0.5-1 tablets (0.5-1 mg) by mouth daily as needed for anxiety       There is no refill protocol information for this order        Routing refill request to provider for review/approval because:  Drug not on the [facility] refill protocol       Chantel Staley RN, BSN, PHN

## 2019-06-12 RX ORDER — CLONAZEPAM 1 MG/1
0.5-1 TABLET ORAL DAILY PRN
Qty: 30 TABLET | Refills: 1 | Status: SHIPPED | OUTPATIENT
Start: 2019-06-12 | End: 2019-12-09

## 2019-06-12 RX ORDER — CLONAZEPAM 1 MG/1
0.5-1 TABLET ORAL DAILY PRN
Qty: 30 TABLET | Refills: 1 | OUTPATIENT
Start: 2019-06-12 | End: 2019-06-12

## 2019-06-12 NOTE — TELEPHONE ENCOUNTER
Apprived but I am not able to print the rx for some reason. Cold you try to print it and place on my desk and I will sign it.    patient would like faxed to pharmacy  Ruperto Resendez PA-C

## 2019-06-14 ENCOUNTER — TELEPHONE (OUTPATIENT)
Dept: PALLIATIVE MEDICINE | Facility: CLINIC | Age: 54
End: 2019-06-14

## 2019-06-14 NOTE — TELEPHONE ENCOUNTER
Pt sees Susana Pike APRN CNP on 6/19/19.  This can be discussed then.    Called pt and relayed the above.  He said okay.    Cha Molina RN-BSN  [facility]

## 2019-06-14 NOTE — TELEPHONE ENCOUNTER
Received call from patient who is requesting a Left Lumbar RFA. Patient states that he hasn't had the left side done, but had the option to do it and declined at one point. He states that his left side is bothering him a lot now. Phone #[phone]        Elana Maldonado    [facility]

## 2019-06-18 ENCOUNTER — OFFICE VISIT (OUTPATIENT)
Dept: PALLIATIVE MEDICINE | Facility: CLINIC | Age: 54
End: 2019-06-18
Payer: COMMERCIAL

## 2019-06-18 ENCOUNTER — TELEPHONE (OUTPATIENT)
Dept: FAMILY MEDICINE | Facility: CLINIC | Age: 54
End: 2019-06-18

## 2019-06-18 VITALS
DIASTOLIC BLOOD PRESSURE: 94 MMHG | SYSTOLIC BLOOD PRESSURE: 138 MMHG | WEIGHT: 270 LBS | HEART RATE: 96 BPM | BODY MASS INDEX: 32.87 KG/M2

## 2019-06-18 DIAGNOSIS — G89.29 CHRONIC PAIN OF BOTH KNEES: ICD-10-CM

## 2019-06-18 DIAGNOSIS — M47.816 SPONDYLOSIS OF LUMBAR REGION WITHOUT MYELOPATHY OR RADICULOPATHY: ICD-10-CM

## 2019-06-18 DIAGNOSIS — M45.7 ANKYLOSING SPONDYLITIS OF LUMBOSACRAL REGION (H): ICD-10-CM

## 2019-06-18 DIAGNOSIS — M25.561 CHRONIC PAIN OF BOTH KNEES: ICD-10-CM

## 2019-06-18 DIAGNOSIS — M54.59 LUMBAR FACET JOINT PAIN: Primary | ICD-10-CM

## 2019-06-18 DIAGNOSIS — M25.562 CHRONIC PAIN OF BOTH KNEES: ICD-10-CM

## 2019-06-18 DIAGNOSIS — G89.4 CHRONIC PAIN SYNDROME: ICD-10-CM

## 2019-06-18 DIAGNOSIS — F17.200 SMOKING ADDICTION: Primary | ICD-10-CM

## 2019-06-18 PROCEDURE — 99214 OFFICE O/P EST MOD 30 MIN: CPT | Performed by: NURSE PRACTITIONER

## 2019-06-18 NOTE — TELEPHONE ENCOUNTER
Routing refill request to provider for review/approval because:  Drug not active on patient's medication list.    Ida Hirsch RN, [facility]

## 2019-06-18 NOTE — PROGRESS NOTES
[facility]    June 18, 2019        Chief complaint: lower  back pain. Bilateral knee pain, hand pain, shoulder pain.       Interval history:  Jailene Breen is a 53 year old male is known to me for   Lumbar spondylosis, pain is worse with extension and rotation indicating a facetogenic component to pain  Lumbar degenerative disc disease  SI joint pain  Ankylosing spondylitis  Myofascial pain  Chronic pain syndrome  PMHx includes: Panic attacks, back pain, arthritis, anxiety, ankylosing spondylitis  PSHx includes: Right knee surgery ×2, left foot surgery right knee arthroscopy        Recommendations/plan at the last visit on 3/12/2019 included:  1. Recommended to increase activity while pacing himself  2. Physical Therapy:  I agree with left knee physical therapy  3. Clinical Health Psychologist: none  4. Diagnostic Studies:  none  5. Medication Management:    1. Continue oxycodone 5mg BID PRN  2. Continue Flexeril 5-10mg TID PRN   6. Further procedures recommended: Patient to schedule repeat lumbar radiofrequency ablation, office to contact patient.  7. UDS today, last took oxycodone 2 days ago. States using clonazepam daily  8. Re-signed opiate agreement   9. Recommendations to PCP: see above  10. Follow up: 8-12 weeks  11. Patient strongly encouraged to see Primary Care Provider re: his ongoing shortness of breath      Since his last visit, Jailene Breen reports:  -Pain is worse. The patient switched from Enbrel to Humira during a trip to [place]. The long plane ride and medication change aggravated his symptoms severely.  -Rheumatologist prescribed extra pain medication until Humira script is approved.   -Low back right side is somewhat better after previous RFA 4/4/2019.  -Bilateral knee pain left greater than right.  -Discussed the use of over the counter CBD oil for possible improved pain relief.         At this point, the patient's participation with our multidisciplinary team  "includes:  The patient has been compliant with the program.    PT - has seen Cyndee White, none recently  Health Psych - seeing Padilla Keene, last on 11/6/2018.  Padilla Keene is no longer with our clinic.       Pain scores:  Pain intensity on average is 9 on a scale of 0-10.    Range is 9-10/10.   Pain right now is 10/10.  Pain is described as \"burning, shooting, throbbing, stabbing, and miserable.\"  Pain is constant in nature    Current pain relevant medications:   -nortriptyline 50mg at bedtime (helpful)  -humira 40mg twice monthly(helpful--off of this at present time)  -ibuprofen 800mg TID PRN (using 3 times daily-helpful)  -Tylenol 500mg using 1000mg TID (unsure if helpful)  -Maxalt 10mg PRN migraine (helpful for migraine--he does have visual aura)  -Oxycodone 5mg BID PRN (taking 1-2 tabs per day)   -Flexeril 10mg TID PRN (helpful)      Other pertinent medications:  -clonazepam 1mg tab, may take 0.5-1mg BID PRN anxiety (helpful, last few days has been using twice per day)  -methotrexate 2.5mg taking 6 tabs once per week    Previous Medications:  OPIATES: Oxycodone (helpful), Fentanyl (100mcg/hr patch helpful), hydrocodone (itching), Tramadol (not helpful)   NSAIDS: ibuprofen (not helpful), Aleve (not helpful)  MUSCLE RELAXANTS: methocarbamol (somewhat helpful), Flexeril (somewhat helpful), tizanidine (unsure if helpful), metaxalone (unsure), SOMA (helpful)  ANTI-MIGRAINE MEDS: Maxalt (helpful for migraine), Imitrex injection (somewhat helpful)  ANTI-DEPRESSANTS: Prozac (helpful), Cymbalta (felt weird on it), nortriptyline (unsure if helpful), Buspar (unsure), Remeron (blacked out), bupropion (not helpful for smoking cessation)  SLEEP AIDS: Ambien (helpful)  ANTI-CONVULSANTS: gabapentin (felt odd on this medication, unsure of dose), Lyrica (not helpful for 4 months, unsure of dose), Topamax (not helpful, he felt weird on it)   TOPICALS: Lidocaine patches (not helpful), Voltaren gel (not " helpful)  Other meds: Tylenol (unsure if helpful)        Other treatments have included:  Jailene Breen has been seen at a pain clinic in the past.  [facility]  PT: tried, not helpful  Chiropractic: tried, not helpful  Acupuncture: none  TENs Unit: tried, helpful     Injections:   -has had injections at outside clinics  -has had epidural injections for low back pain (one was helpful)  -he has had lumbar medial branch blocks at [facility] and he was going to have lumbar radiofrequency ablation (did not do this due to cost)  -4/3/2017 right LMBBs with Dr. Ashlyn Gamble (helpful)  -4/26/2017 right LMBBs with Dr. Ashlyn Gamble (helpful)  -5/31/2017 right L3, L4, L5 RFA with Dr. Ashlyn Gamble (good relief for over 6 months)  -3/15/2018 right L5 transforaminal MAKAYLA with Dr. Ashlyn Gamble (not helpful)  -5/10/2018 trigger point injections with Dr. Ashlyn Gamble(somewhat helpful).  -7/12/2018 Right L3, L4, L5 RFA with Dr. Ashlyn Gamble (helpful).  -9/20/2018 Left piriformis injections, bilateral gluteal trigger point with Dr. Gamble (helpful).  -1/31/2019 right L2-3, L3-4 and L4-5 facet joint injections with Dr. Ashlyn Gamble (somewhat helpful)  4/4/2019  L3, L4, L5/sacral ala lumbar radiofrequency ablation with Dr. Gamble (helpful over right side)            Side Effects: no side effect  Patient is using the medication as prescribed: YES    Medications:  Current Outpatient Medications   Medication Sig Dispense Refill     albuterol (PROAIR HFA/PROVENTIL HFA/VENTOLIN HFA) 108 (90 Base) MCG/ACT inhaler Inhale 2 puffs into the lungs every 6 hours as needed for shortness of breath / dyspnea 1 Inhaler 3     clonazePAM (KLONOPIN) 1 MG tablet Take 0.5-1 tablets (0.5-1 mg) by mouth daily as needed for anxiety 30 tablet 1     cyclobenzaprine (FLEXERIL) 10 MG tablet Take 0.5-1 tablets (5-10 mg) by mouth 3 times daily as needed for muscle spasms Caution sedation 60 tablet 2     folic acid (FOLVITE) 1 MG tablet  Reported on 5/19/2017       HUMIRA PEN 40 MG/0.8ML pen kit        IBUPROFEN PO Take 800 mg by mouth every 6 hours as needed for moderate pain Reported on 5/19/2017       methotrexate 2.5 MG tablet 6 tablets Reported on 4/3/2017       nicotine ([employer] NICOTINE POLACRILEX) 2 MG lozenge Place 1 lozenge (2 mg) inside cheek every hour as needed for smoking cessation 360 lozenge 1     predniSONE (DELTASONE) 20 MG tablet Take 1 tablet (20 mg) by mouth daily 5 tablet 0     sertraline (ZOLOFT) 100 MG tablet Take 1 tablet (100 mg) by mouth daily 90 tablet 1     sertraline (ZOLOFT) 50 MG tablet Take 1 tablet (50 mg) by mouth daily 90 tablet 1     nicotine (NICORETTE) 2 MG gum Place 1 each (2 mg) inside cheek as needed for smoking cessation 240 each 11     oxyCODONE (ROXICODONE) 5 MG tablet Take 1-2 tablets (5-10 mg) by mouth every 6 hours as needed for severe pain Max of 2/day  Fill 7/1/19 and start 7/3/19. 60 tablet 0       Medical History: any changes in medical history since they were last seen? No    Social History:   Home situation: , has 2 children in college  Occupation/Schooling: currently unemployed, worked as a  (unemployed since 3/1/2017). 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  yes  If yes, was cessation counseling offered?  Not today        Review of Systems:  ROS is positive as per HPI as well as for  HA, ringing in ears, cough, SOB, joint pain, arthritis, back pain, neck pain, numbness/tingling, depression, anxiety, stress, and is negative for fevers, chills, sweats, or constipation, diarrhea.    This document serves as a record of the services and decisions personally performed and made by Susana GRANT. It was created on her behalf by Asif Carreno, a trained medical scribe. The creation of this document is based on the provider's  statements to the medical scribe.  Asif Crareno 8:45 AM June 18, 2019      Physical Exam:  Vital signs: BP (!) 138/94   Pulse 96   Wt 122.5 kg (270 lb)   BMI 32.87 kg/m       Behavioral observations:  Awake, alert, cooperative.     Gait:  normal    Musculoskeletal exam:    Lumbar extension to 5 degrees  Lumbar extension/rotation to the right is painful  Lumbar extension/rotation to the left is painful   SI joints are non tender  Piriformis are non tender  Greater trochanters are non tender  Strength grossly equal throughout      Neuro exam:  SILT in all extremities     Skin/vascular/autonomic:  No suspicious lesions on exposed skin.      Other:  NA    Is the patient hypertensive today? Yes  Hypertensive on recheck of BP?   Yes  If yes, was patient recommended to see Primary Care Provider in follow up for management of HTN?  Yes          [place] Prescription Monitoring Program:  Reviewed 6/18/19, no issues    DIRE Score for selecting candidates for long term opioid analgesia for chronic pain:  Diagnosis  2  Intractablility  2  Risk    Psychological health  2    Chemical health  2    Reliability  2    Social support  2  Efficacy  2    Total DIRE Score = 14. Note that  7-13 predicts poor outcome (compliance and efficacy) from opioid prescribing; 14-21 predicts good outcome (compliance and efficacy)  from opioid prescribing.      Assessment:   1. Lumbar facet joint pain     2. Spondylosis of lumbar region without myelopathy or radiculopathy   3. Ankylosis spondylitis of lumbosacral region  4. Chronic pain of both knees   5. Chronic pain syndrome  6. PMHx includes: Panic attacks, back pain, arthritis, anxiety, ankylosing spondylitis  7. PSHx includes: Right knee surgery ×2, left foot surgery right knee arthroscopy      Plan:   1. Recommended to increase activity while pacing himself  2. Physical Therapy:  He is not currently involved with PHYSICAL THERAPY   3. Clinical Health Psychologist: none  4. Diagnostic  Studies:  none  5. Medication Management:    1. Continue oxycodone 5mg BID PRN  2. Continue Flexeril 5-10mg TID PRN   3. Patient to consider the purchase of CBD oil from the CBD Store in [place] or [place].  6. Further procedures recommended: Patient to schedule bilateral lumbar facet steroid injections, office to contact patient.   7. Les to follow up with Primary Care provider regarding elevated blood pressure.  8. Recommendations to PCP: see above  9. Follow up: 8-12 weeks    Total time spent face to face was 25 minutes and more than 50% of face to face time was spent in counseling and/or coordination of care regarding the diagnosis and recommendations above.    The information in this document, created by the medical scribe for me, accurately reflects the services I personally performed and the decisions made by me. I have reviewed and approved this document for accuracy prior to leaving the patient care area.  June 18, 2019       Susana GRANT, RN CNP, FNP  [facility]

## 2019-06-18 NOTE — PATIENT INSTRUCTIONS
PLAN  1. Medications:   1. Continue Oxycodone 5mg twice daily as needed.  2. Continue Flexeril 5-10mg take 3 times daily as needed for muscle spasm.  3. Consider the use of CBD oil from the [facility] in [place] or [place]  2. Procedures: Schedule bilateral lumbar facet steroid injections, our office will contact you with further instructions. Send me a [employer] message 2-3 weeks after procedures to let me know how you are doing.   3. Les to follow up with Primary Care provider regarding elevated blood pressure.  4. Follow-up with me in 8-12 weeks  ----------------------------------------------------------------  Clinic Number:  [phone]   Call this number with any questions about your care and for scheduling assistance. Calls are returned Monday through Friday between 8 AM and 4:30 PM. We usually get back to you within 2 business days depending on the issue/request.       Medication refills:    For non-opioid medications, call your pharmacy directly to request a refill. The pharmacy will contact the Pain Management Center for authorization. Please allow 3-4 days for these refills to be processed.     For opioid refills, call the clinic number or send a [employer] message. Please contact us 7-10 days before your refill is due. The message MUST include the name of the specific medication(s) requested and how you would like to receive the prescription(s). The options are as follows:    Pain Clinic staff can mail the prescription to your pharmacy. Please tell us the name of the pharmacy.    You may pick the prescription up at the Pain Clinic (tell us the location) or during a clinic visit with your pain provider    Pain Clinic staff can deliver the prescription to the [place] pharmacy in the clinic building. Please tell us the location.      We believe regular attendance is key to your success in our program.    Any time you are unable to keep your appointment we ask that you call us at least 24 hours in advance to let  us know. This will allow us to offer the appointment time to another patient.

## 2019-06-18 NOTE — TELEPHONE ENCOUNTER
nicotine polacrilex ([employer] NICOTINE POLACRILEX) 2 MG gum (Discontinued)  Last Written Prescription Date:  06/21/18  Last Fill Quantity: 170,   # refills: 5  Last Office Visit: 03/27/19[place]  Future Office visit:       Routing refill request to provider for review/approval because:  Drug not active on patient's medication list

## 2019-06-20 ENCOUNTER — TELEPHONE (OUTPATIENT)
Dept: PALLIATIVE MEDICINE | Facility: CLINIC | Age: 54
End: 2019-06-20

## 2019-06-20 NOTE — TELEPHONE ENCOUNTER
Pre-screening Questions for Radiology Injections:    Injection to be done at which interventional clinic site? [facility] [place]    Instruct patient to arrive as directed prior to the scheduled appointment time:    [place] minutes before      [place]: 30 minutes before; if IV needed 1 hour before     Procedure ordered by Shahzad    Procedure ordered? bilateral lumbar facet joint steroid injections        Transforaminal Cervical MAKAYLA - Dr. Galilea Loya ONLY    What insurance would patient like us to bill for this procedure? BC      Worker's comp or MVA (motor vehicle accident) -Any injection DO NOT SCHEDULE and route to Yasemin Chente.      [employer] insurance - For SI joint injections, DO NOT SCHEDULE and route Yasemin Chente.       [employer] - Any injection besides hip/shoulder/knee joint DO NOT SCHEDULE and route to Yasemin Eldridge. She will obtain PA and call pt back to schedule procedure or notify pt of denial.       [employer]-Route to Yasemin for review      IF SCHEDULING IN [place] AND NEEDS A PA, IT IS OKAY TO SCHEDULE. [place] HANDLES THEIR OWN PA'S AFTER THE PATIENT IS SCHEDULED. PLEASE SCHEDULE AT LEAST 1 WEEK OUT SO A PA CAN BE OBTAINED.      Any chance of pregnancy? NO   If YES, do NOT schedule and route to RN pool    Is an  needed? No     Patient has a drive home? (mandatory) YES: ok    Is patient taking any blood thinners (plavix, coumadin, jantoven, warfarin, heparin, pradaxa or dabigatran )? No   If hold needed, do NOT schedule, route to RN pool     Is patient taking any aspirin products (includes Excedrin and Fiorinal)? No     If more than 325mg/day do NOT schedule; route to RN pool     For CERVICAL procedures, hold all aspirin products for 6 days.     Tell pt that if aspirin product is not held for 6 days, the procedure WILL BE cancelled.      Does the patient have a bleeding or clotting disorder? No     If YES, okay to schedule AND route to RN nurse pool    For any  patients with platelet count <100, must be forwarded to provider    Is patient diabetic?  NO  If YES, have them bring their glucometer.    Does patient have an active infection or treated for one within the past week? No     Is patient currently taking any antibiotics?  No     For patients on chronic, preventative, or prophylactic antibiotics, procedures may be scheduled.     For patients on antibiotics for active or recent infection:antibiotic course must have been completed for 4 days    Is patient currently taking any steroid medications? (i.e. Prednisone, Medrol)  No     For patients on steroid medications, course must have been completed for 4 days    Reviewed with patient:  If you are started on any steroids or antibiotics between now and your appointment, you must contact us because the procedure may need to be cancelled.  Yes    Is patient actively being treated for cancer or immunocompromised? No  If YES, do NOT schedule and route to RN pool     Are you able to get on and off an exam table with minimal or no assistance? Yes  If NO, do NOT schedule and route to RN pool    Are you able to roll over and lay on your stomach with minimal or no assistance? Yes  If NO, do NOT schedule and route to RN pool     Any allergies to contrast dye, iodine, shellfish, or numbing and steroid medications? No  If YES, route to RN pool AND add allergy information to appointment notes    Allergies: Hydrocodone and Remeron soltab      Has the patient had a flu shot or any other vaccinations within 7 days before or after the procedure.  No     Does patient have an MRI/CT?  Not Applicable  (SI joint, hip injections, lumbar sympathetic blocks, and stellate ganglion blocks do not require an MRI)    Was the MRI done w/in the last 3 years?  NA    Was MRI done at [place]? No      If not, where was it done? N/A       If MRI was not done at [place], [facility] do NOT schedule and route to nursing.  If pt has an imaging  disc, the injection may be scheduled but pt has to bring disc to appt. If they show up w/out disc the injection cannot be done    Reminders (please tell patient if applicable):       Instructed pt to arrive 30 minutes early for IV start if this is for a cervical procedure, ALL sympathetic (stellate ganglion, hypogastric, or lumbar sympathetic block) and all sedation procedures (RFA, spinal cord stimulation trials).  Not Applicable   -IVs are not routinely placed for Dr. Chen cervical cases   -Dr. Holley: IVs for cervical ESIs and cervical TBDs (not CMBBs/facet inj)      If NPO for sedation, informed patient that it is okay to take medications with sips of water (except if they are to hold blood thinners).  Not Applicable   *DO take blood pressure medication if it is prescribed*      If this is for a cervical MAKAYLA, informed patient that aspirin needs to be held for 6 days.   Not Applicable      For all patients not having spinal cord stimulator (SCS) trials or radiofrequency ablations (RFAs), informed patient:    IV sedation is not provided for this procedure.  If you feel that an oral anti-anxiety medication is needed, you can discuss this further with your referring provider or primary care provider.  The Pain Clinic provider will discuss specifics of what the procedure includes at your appointment.  Most procedures last 10-20 minutes.  We use numbing medications to help with any discomfort during the procedure.  Not Applicable      Do not schedule procedures requiring IV placement in the first appointment of the day or first appointment after lunch. Do NOT schedule at 0745, 0815 or 1245.       For patients 85 or older we recommend having an adult stay w/ them for the remainder of the day.       Does the patient have any questions?  NO  Elsa Donovan  [facility]

## 2019-06-23 ENCOUNTER — MYC MEDICAL ADVICE (OUTPATIENT)
Dept: PALLIATIVE MEDICINE | Facility: CLINIC | Age: 54
End: 2019-06-23

## 2019-06-23 DIAGNOSIS — G89.29 CHRONIC BILATERAL LOW BACK PAIN WITH RIGHT-SIDED SCIATICA: ICD-10-CM

## 2019-06-23 DIAGNOSIS — M54.41 CHRONIC BILATERAL LOW BACK PAIN WITH RIGHT-SIDED SCIATICA: ICD-10-CM

## 2019-06-24 NOTE — TELEPHONE ENCOUNTER
Medication refill information reviewed.     Due date for OxyCODONE (ROXICODONE) 5 MG tablet is 7/3/19     Prescriptions prepped for review.     Will route to provider.     -Rheumatologist prescribed extra pain medication until Enbrel prescription is refilled.  Will reorder Pt's Rx from Susana as normal, Rx from Rheumatologist was prescribed for above what Susana prescribes and Susana was informed.    E-prescribe to   [id number] - [place] - [address]  [address] [address]  Phone: [phone] Fax: [phone]    Josias Lopez, RN  Care Coordinator   [facility] [employer]

## 2019-06-24 NOTE — TELEPHONE ENCOUNTER
Received Dyn[employer] message from patient requesting refill(s) of \    OxyCODONE (ROXICODONE) 5 MG tablet   Last picked up from pharmacy on 06/17/19    Pt last seen by prescribing provider on 06/18/19    No future appointments scheduled at this time     checked in the past 6 months? Yes If no, print current report and give to RN    Last urine drug screen date 03/12/19  Current opioid agreement on file (completed within the last year) Yes Date of opioid agreement: 03/12/19    Processing (pick one and delete the others):      E-prescribe to   [id number] - [place] - [address]  [address] [address]  Phone: [phone] Fax: [phone]      Will route to nursing [place] for review and preparation of prescription(s).     ----------  Patient's "[employer]hart message: Date 06/23/19    Hello,  just asking a week ahead of time,  can you please refill my oxycodone and send it to [facility].  I also have my injection schedule this coming Friday.    Sherron Jennings [facility]  [place]

## 2019-06-24 NOTE — TELEPHONE ENCOUNTER
Routed to the nursing pool and to the MA pool to process refill(s).    Cha Molina, RN-BSN  [facility]

## 2019-06-25 RX ORDER — OXYCODONE HYDROCHLORIDE 5 MG/1
5-10 TABLET ORAL EVERY 6 HOURS PRN
Qty: 60 TABLET | Refills: 0 | Status: SHIPPED | OUTPATIENT
Start: 2019-06-25 | End: 2019-07-17

## 2019-06-25 NOTE — TELEPHONE ENCOUNTER
Signed Prescriptions:                        Disp   Refills    oxyCODONE (ROXICODONE) 5 MG tablet         60 tab*0        Sig: Take 1-2 tablets (5-10 mg) by mouth every 6 hours as           needed for severe pain Max of 2/day  Fill 7/1/19           and start 7/3/19.  Authorizing Provider: SUSANA PETTY    Reviewed [place]  June 25, 2019- no concerning fills. I was aware of script from rheumatology.     Susana Petty APRN, RN CNP, FNP  [facility] [place]

## 2019-06-26 ENCOUNTER — TELEPHONE (OUTPATIENT)
Dept: PALLIATIVE MEDICINE | Facility: CLINIC | Age: 54
End: 2019-06-26

## 2019-06-26 NOTE — TELEPHONE ENCOUNTER
Spoke to patient that Rx was E-Prepcribed to:       [id number] - [place] - [address]  [address]  [place] [address]  Phone: [phone] Fax: [phone]      Patient notified of dispense and start date. Fill 7/1/19 and start 7/3/19.    Ethel Tuttle [facility] [place]

## 2019-06-26 NOTE — TELEPHONE ENCOUNTER
Spoke to patient to inform him of refill. Patient states he is in so much pain and is not sure what to do. He would like an RN to call him to discuss if he should go to the ER.

## 2019-06-27 NOTE — TELEPHONE ENCOUNTER
Call back to Pt.    Pt reports that he is in pain and his pain medication is not helping much.  Pt stated that he had to stop his Humara and will be placed on Embral.  Pt has been having flairs from this.      Pt is asking for an increase in pain medication until Embral starts to help him, Pt started his embral yesterday and said that it will will take a while to take effect.      Will forward to Susana to review and advise on increasing pain medication for a few days.      Josias Lopez, RN  Care Coordinator   [facility] [place]

## 2019-06-28 ENCOUNTER — MYC MEDICAL ADVICE (OUTPATIENT)
Dept: PALLIATIVE MEDICINE | Facility: CLINIC | Age: 54
End: 2019-06-28

## 2019-06-28 ENCOUNTER — ANCILLARY PROCEDURE (OUTPATIENT)
Dept: RADIOLOGY | Facility: CLINIC | Age: 54
End: 2019-06-28
Attending: PAIN MEDICINE
Payer: COMMERCIAL

## 2019-06-28 ENCOUNTER — RADIOLOGY INJECTION OFFICE VISIT (OUTPATIENT)
Dept: PALLIATIVE MEDICINE | Facility: CLINIC | Age: 54
End: 2019-06-28
Attending: NURSE PRACTITIONER
Payer: COMMERCIAL

## 2019-06-28 VITALS — HEART RATE: 78 BPM | SYSTOLIC BLOOD PRESSURE: 138 MMHG | OXYGEN SATURATION: 97 % | DIASTOLIC BLOOD PRESSURE: 91 MMHG

## 2019-06-28 DIAGNOSIS — M47.816 LUMBAR FACET ARTHROPATHY: ICD-10-CM

## 2019-06-28 DIAGNOSIS — M47.816 SPONDYLOSIS OF LUMBAR REGION WITHOUT MYELOPATHY OR RADICULOPATHY: Primary | ICD-10-CM

## 2019-06-28 PROCEDURE — 64494 INJ PARAVERT F JNT L/S 2 LEV: CPT | Mod: 50 | Performed by: PAIN MEDICINE

## 2019-06-28 PROCEDURE — 64493 INJ PARAVERT F JNT L/S 1 LEV: CPT | Mod: 50 | Performed by: PAIN MEDICINE

## 2019-06-28 ASSESSMENT — PAIN SCALES - GENERAL: PAINLEVEL: WORST PAIN (10)

## 2019-06-28 NOTE — TELEPHONE ENCOUNTER
Sent myChart re: answer dated 6/28/2019    Susana GRANT, RN CNP, FNP  [facility] [place]

## 2019-06-28 NOTE — NURSING NOTE
Discharge Information    IV Discontiued Time:  NA    Amount of Fluid Infused:  NA    Discharge Criteria = When patient returns to baseline or as per MD order    Consciousness:  Pt is fully awake    Circulation:  BP +/- 20% of pre-procedure level    Respiration:  Patient is able to breathe deeply    O2 Sat:  Patient is able to maintain O2 Sat >92% on room air    Activity:  Moves 4 extremities on command    Ambulation:  Patient is able to stand and walk or stand and pivot into wheelchair    Dressing:  Clean/dry or No Dressing    Notes:   Discharge instructions and AVS given to patient    Patient meets criteria for discharge?  YES    Admitted to PCU?  No    Responsible adult present to accompany patient home?  Yes    Signature/Title:    dariel kennedy RN Care Coordinator  [facility] [place]

## 2019-06-28 NOTE — PROGRESS NOTES
Pre procedure Diagnosis: lumbar facet arthropathy, lumbar spondylosis   Post procedure Diagnosis: Same  Procedure performed: Bilateral facet joint injections at l4-5, L5-S1, fluoroscopically guided, contrast controlled  Indication:  Therapeutic for pain  Anesthesia: none  Complication:  none  Operators:  Aguilar Holley MD    Indications:   Jailene Breen is a 53 year old male was sent  bilateral facet joint injections f  The patient has a history of axial low back pain.  Currently having a flare after his RFA.  Exam shows  reproduction of pain with extension and lateral rotation meds/.  They have tried conservative treatment including PT/prior procedures.    Options/alternatives, benefits and risks were discussed with the patient including bleeding, infection, flared pain, tissue trauma, exposure to radiation, reaction to medications including seizure, spinal cord injury, paralysis, weakness, numbness and headache.     Questions were answered to his satisfaction and he wishes to proceed. Voluntary informed consent was obtained and signed.     Vitals were reviewed: Yes  Allergies were reviewed:  Yes   Medications were reviewed:  Yes   Pre-procedure pain score: 10/10    Procedure:  After obtaining signed informed consent, the patient was brought into the procedure suite and was placed in a prone position on the procedure table.   A Pause for the Cause was performed.  The patient was prepped and draped in the usual sterile fashion.     Under AP fluoroscopic guidance the L4-5, L5-S1 facet joints on the right/left side were identified, and the C-arm was rotated obliquely to the affected side to open the joint space. A total of 8 ml of 1% lidocaine was injected at the needle entry point and needle tract. Then a 22 gauge 3.5 inch spinal needle was inserted and advanced under fluoroscopic guidance targeting the superior articular process of each joint. Once the needle made contact with the SAP was rotated and advanced  into the joint.    AP and lateral fluoroscopic views were obtained to confirm the needle placement. Then,  Omnipaque 0.25 contrast dye was injected after negative aspiration for heme and CSF in each joint, confirming appropriate needle placement.  A total of 1ml of Omnipaque was used.  Omnipaque wasted:  9 ml.    Then, each joint was injected with 1 ml of a combination of Kenalog 40 mg and 0.5% bupivacaine 3 ml (total injectate volume 4 ml) and the needles were flushed with local anesthetic as they were withdrawn.       Hemostasis was achieved, the area was cleaned, and bandaids were placed when appropriate.  The patient tolerated the procedure well, and was taken to the recovery room.    Images were saved to PACS.    Post-procedure pain score: 8/10  Follow-up includes:   -f/u phone call in one week  -f/u with referring provider    Aguilar Holley MD  [facility] [place]

## 2019-06-28 NOTE — PATIENT INSTRUCTIONS
[facility]   Procedure Discharge Instructions    Today you saw:     Dr. Aguilar Holley     You had an:     facet joint injection     Medications used:  Lidocaine   Bupivacaine   Omnipaque   Kenalog           Be cautious when walking. Numbness and/or weakness in the lower extremities may occur for up to 6-8 hours after the procedure due to effect of the local anesthetic    Do not drive for 6 hours. The effect of the local anesthetic could slow your reflexes.     You may resume your regular activities after 24 hours    Avoid strenuous activity for the first 24 hours    You may shower, however avoid swimming, tub baths or hot tubs for 24 hours following your procedure    You may have a mild to moderate increase in pain for several days following the injection.    It may take up to 14 days for the steroid medication to start working although you may feel the effect as early as a few days after the procedure.       You may use ice packs for 10-15 minutes, 3 to 4 times a day at the injection site for comfort    Do not use heat to painful areas for 6 to 8 hours. This will give the local anesthetic time to wear off and prevent you from accidentally burning your skin.     Unless you have been directed to avoid the use of anti-inflammatory medications (NSAIDS), you may use medications such as ibuprofen, Aleve or Tylenol for pain control if needed.     If you were fasting, you may resume your normal diet and medications after the procedure    If you have diabetes, check your blood sugar more frequently than usual as your blood sugar may be higher than normal for 10-14 days following a steroid injection. Contact your doctor who manages your diabetes if your blood sugar is higher than usual    Possible side effects of steroids that you may experience include flushing, elevated blood pressure, increased appetite, mild headaches and restlessness.  All of these symptoms will get better with time.    If you  experience any of the following, call the Pain Clinic during work hours at [phone] or the Provider Line after hours at [phone]:  -Fever over 100 degree F  -Swelling, bleeding, redness, drainage, warmth at the injection site  -Progressive weakness or numbness in your legs or arms  -Loss of bowel or bladder function  -Unusual headache that is not relieved by Tylenol or other pain reliever  -Unusual new onset of pain that is not improving

## 2019-06-28 NOTE — TELEPHONE ENCOUNTER
Hi Les-    You may take a max of 3 tabs per day of the Oxycodone. Please count your tabs and let me know how many remain so I will be able to figure out the start date of next script.     Thanks  Susana GRANT RN CNP, P  [facility]    I have sent the above [employer] message to the patient.     Susana GRANT RN CNP, P  [facility] [place]

## 2019-07-01 NOTE — TELEPHONE ENCOUNTER
Per patient myChart message:  From: Jamison Breen      Created: 6/28/2019 4:50 PM      That will be three days worth         Routed to provider.    Cha Molina, RN-BSN  [facility]

## 2019-07-01 NOTE — TELEPHONE ENCOUNTER
Writer called Pt and asked that he use his current Rx and let us know when he is going to be out as he increased to 3 tablets, Pt should be out 3 days early.    Pt understands.    Josias Lopez, RN  Care Coordinator   [facility] [place]

## 2019-07-15 ENCOUNTER — MYC MEDICAL ADVICE (OUTPATIENT)
Dept: PALLIATIVE MEDICINE | Facility: CLINIC | Age: 54
End: 2019-07-15

## 2019-07-15 DIAGNOSIS — G89.29 CHRONIC BILATERAL LOW BACK PAIN WITH RIGHT-SIDED SCIATICA: ICD-10-CM

## 2019-07-15 DIAGNOSIS — M54.41 CHRONIC BILATERAL LOW BACK PAIN WITH RIGHT-SIDED SCIATICA: ICD-10-CM

## 2019-07-16 NOTE — TELEPHONE ENCOUNTER
From: Jamison Breen      Created: 7/15/2019 12:12 AM        *-*-*This message has not been handled.*-*-*    Hello,  I'm just asking ahead of time could you please refill my oxycodone and remember to explain to them the reason I will be 10 days early because you did increase to 3 day for me. Thank you.        Will forward to MA and Nurse Pool to process    Josias Lopez, RN  Care Coordinator   [facility] [place]

## 2019-07-17 ENCOUNTER — MYC MEDICAL ADVICE (OUTPATIENT)
Dept: PALLIATIVE MEDICINE | Facility: CLINIC | Age: 54
End: 2019-07-17

## 2019-07-17 DIAGNOSIS — G89.29 CHRONIC BILATERAL LOW BACK PAIN WITH RIGHT-SIDED SCIATICA: ICD-10-CM

## 2019-07-17 DIAGNOSIS — M54.41 CHRONIC BILATERAL LOW BACK PAIN WITH RIGHT-SIDED SCIATICA: ICD-10-CM

## 2019-07-17 RX ORDER — OXYCODONE HYDROCHLORIDE 5 MG/1
5-10 TABLET ORAL EVERY 6 HOURS PRN
Qty: 90 TABLET | Refills: 0 | Status: SHIPPED | OUTPATIENT
Start: 2019-07-17 | End: 2019-07-18

## 2019-07-17 NOTE — TELEPHONE ENCOUNTER
Medication refill information reviewed. Verified the OK to increase to 3 tabs/day on 6/28:    Hi Les-     You may take a max of 3 tabs per day of the Oxycodone. Please count your tabs and let me know how many remain so I will be able to figure out the start date of next script.     Thanks   Susana GRANT, RN CNP, FNP   [facility] [place]   -------------  Pt confirmed that he will be out 3 days early.     Due date for oxycodone is 7/30     Prescription prepped for review. Updated max to 3/day and quantity to #90.     Will route to provider.     KERLINE ParkerN, RN-BC  Patient Care Supervisor/Care Coordinator  [facility] [place]

## 2019-07-17 NOTE — TELEPHONE ENCOUNTER
Signed Prescriptions:                        Disp   Refills    oxyCODONE (ROXICODONE) 5 MG tablet         90 tab*0        Sig: Take 1-2 tablets (5-10 mg) by mouth every 6 hours as           needed for severe pain Max of 3/day  Fill 7/26/19           and start 7/28/19.  Authorizing Provider: SUSANA PETTY    Reviewed [place]  July 17, 2019- no concerning fills.    Susana Petty APRN, RN CNP, FNP  [facility] [place]

## 2019-07-17 NOTE — TELEPHONE ENCOUNTER
Received call from patient requesting refill(s) of oxyCODONE (ROXICODONE) 5 MG tablet Per patient-remember to explain to them the reason I will be 10 days early because you did increase to 3 day for me.     Last picked up from pharmacy on 07/01/19    Pt last seen by prescribing provider on 06/18/19 office visit - has had injection since  Next appt scheduled for : none     checked in the past 6 months? Yes If no, print current report and give to RN    Last urine drug screen date 03/12/19  Current opioid agreement on file (completed within the last year) Yes Date of opioid agreement: 03/12/19    Processing (pick one and delete the others):      [facility]cribe to [id number] - [place] - [address]     Will route to nursing pool for review and preparation of prescription(s).

## 2019-07-18 NOTE — TELEPHONE ENCOUNTER
Susana, Pt increased to 3 tabs.  Rx is due on 7/22/19.    Dates were wrong.      Please sign new Rx and writer will call pharmacy and cancel old Rx.    Josias Lopez, RN  Care Coordinator   [facility] [place]

## 2019-07-19 RX ORDER — OXYCODONE HYDROCHLORIDE 5 MG/1
5-10 TABLET ORAL 3 TIMES DAILY PRN
Qty: 90 TABLET | Refills: 0 | Status: SHIPPED | OUTPATIENT
Start: 2019-07-19 | End: 2019-08-13

## 2019-07-19 NOTE — TELEPHONE ENCOUNTER
Signed Prescriptions:                        Disp   Refills    oxyCODONE (ROXICODONE) 5 MG tablet         90 tab*0        Sig: Take 1-2 tablets (5-10 mg) by mouth 3 times daily as           needed for pain Doses 6 hours apart.Max of 3           tabs/day  Fill 7/20/19; start 7/22/19.  Authorizing Provider: SUSANA PETTY    Reviewed [place]  July 19, 2019- no concerning fills.    Susana Petty APRN, RN CNP, FNP  [facility] [place]

## 2019-07-19 NOTE — TELEPHONE ENCOUNTER
Message sent to pt:    Lopez Swift,     I am sorry for the problem with your prescription! I misread the details of the planning and entered the later dates.  I so apologize.  Josias is working with Susana Pike CNP to get it corrected.      Take care,     KERLINE ParkerN, RN-BC  Patient Care Supervisor/Care Coordinator  [facility] [place]

## 2019-07-25 ENCOUNTER — MYC MEDICAL ADVICE (OUTPATIENT)
Dept: FAMILY MEDICINE | Facility: CLINIC | Age: 54
End: 2019-07-25

## 2019-07-25 NOTE — TELEPHONE ENCOUNTER
[facility] message sent back to patient advising on scheduling an OV or going to UC for symptoms.    Amanda Rutledge, KERLINEN, RN, PHN

## 2019-08-03 ENCOUNTER — MYC MEDICAL ADVICE (OUTPATIENT)
Dept: PALLIATIVE MEDICINE | Facility: CLINIC | Age: 54
End: 2019-08-03

## 2019-08-05 NOTE — TELEPHONE ENCOUNTER
Tariq message from patient on 8/3 at 0853:    Hello,  just wanted to let you know that my attorneys,  the ones who are representing me fo my ssdi case will be sending you a letter. I figured since I have been with you for awhile and you know the issues I'm going through. I'm praying my court date in September goes well,  its been really hard.   -----------  Message sent to pt:    Lopez Swift,     Thank you for the heads up about the letter from your attorneys. Susana Pike CNP will review this message when she returns from vacation. We also hope that all goes well with the court date in September since we know that you have gone through a lot!.     Take care,     Chrissie Milan, KERLINEN, RN-BC  Patient Care Supervisor/Care Coordinator  [facility]

## 2019-08-11 ENCOUNTER — MYC MEDICAL ADVICE (OUTPATIENT)
Dept: PALLIATIVE MEDICINE | Facility: CLINIC | Age: 54
End: 2019-08-11

## 2019-08-11 DIAGNOSIS — M54.41 CHRONIC BILATERAL LOW BACK PAIN WITH RIGHT-SIDED SCIATICA: ICD-10-CM

## 2019-08-11 DIAGNOSIS — G89.29 CHRONIC BILATERAL LOW BACK PAIN WITH RIGHT-SIDED SCIATICA: ICD-10-CM

## 2019-08-12 NOTE — TELEPHONE ENCOUNTER
Fax received 8- from another [facility] site and forwarded onto [place]    Reason:  Form   Our goal is to have forms completed within 72 hours, however some forms may require a visit or additional information.     Who is the form from? [employer] & [employer] Contreras (if other please explain)  Where did the form come from? form was faxed in  What clinic location was the form placed at? Paperwork was faxed to [place] from another site and forwarded onto provider's [place] clinic    Where was the form placed?   What number is listed as a contact on the form? [phone]    Phone call message - patient request for a letter, form or note:     Date needed: as soon as possible - Please return form no later than August 19, 2019  Please fax to [phone]  Has the patient signed a consent form for release of information? YES - included in fax    Type of letter, form or note: disability      [place]  Patient Representative  [facility]

## 2019-08-13 RX ORDER — OXYCODONE HYDROCHLORIDE 5 MG/1
5-10 TABLET ORAL 3 TIMES DAILY PRN
Qty: 90 TABLET | Refills: 0 | Status: SHIPPED | OUTPATIENT
Start: 2019-08-13 | End: 2019-09-19

## 2019-08-13 NOTE — TELEPHONE ENCOUNTER
Will forward to MA and Nurse pool to process refill for Oxycodone to [facility] pharmacy in [place] Lopez, RN  Care Coordinator   [facility] [place]

## 2019-08-13 NOTE — TELEPHONE ENCOUNTER
Signed Prescriptions:                        Disp   Refills    oxyCODONE (ROXICODONE) 5 MG tablet         90 tab*0        Sig: Take 1-2 tablets (5-10 mg) by mouth 3 times daily as           needed for pain Doses 6 hours apart.Max of 3           tabs/day  Fill 8/19/19; start 8/21/19.  Authorizing Provider: SUSANA PETTY    Reviewed [place]  August 13, 2019- no concerning fills.    Susana Petty APRN, RN CNP, FNP  [facility] [place]

## 2019-08-13 NOTE — TELEPHONE ENCOUNTER
Medication refill information reviewed.     Due date for oxyCODONE (ROXICODONE) 5 MG tablet is 8/21/19     Prescriptions prepped for review.     Will route to provider.     Josias Lopez, RN  Care Coordinator   [facility] [place]

## 2019-08-13 NOTE — TELEPHONE ENCOUNTER
Received call from patient requesting refill(s) of oxyCODONE (ROXICODONE) 5 MG tablet    Last picked up from pharmacy on 07/20/19    Pt last seen by prescribing provider on 06/18/19  Next appt scheduled for 08/30/19     checked in the past 6 months? Yes If no, print current report and give to RN    Last urine drug screen date 03/12/19  Current opioid agreement on file (completed within the last year) Yes Date of opioid agreement: 03/12/19    Processing (pick one and delete the others):      E-prescribe to  [id number] - [place], [place] - [address]    Will route to nursing pool for review and preparation of prescription(s).

## 2019-08-16 NOTE — TELEPHONE ENCOUNTER
I do not do disability paperwork. Our clinic does not do disability paperwork. Patient can request his medical records for his SSDI claim.    Susana GRANT RN CNP, FNP  [facility] [place]

## 2019-08-16 NOTE — TELEPHONE ENCOUNTER
Message sent to pt:    Lopez Swift,     We received the form from your  and realized that it is related to disability. Susana, nor any of our providers, complete these types of forms but you may request a copy of your medical records for the SSDI claim. The request needs to go to the Medical Records Department.      Chrissie Webb BSN, RN-BC  Patient Care Supervisor/Care Coordinator  [facility] [place]  ------------  Will keep encounter open for a few days for return call/questions from patient.

## 2019-08-19 NOTE — TELEPHONE ENCOUNTER
Per patient myChart message:  From: Jamison Breen      Created: 8/16/2019 4:03 PM      I don't understand why,  she has treated me the last couple of years and know what I'm going through.         Routed to provider.    Cha Molina, RN-BSN  [facility]

## 2019-08-28 NOTE — PROGRESS NOTES
[facility]    August 30, 2019        Chief complaint:  lower  back pain. Bilateral knee pain, hand pain, shoulder pain.       Interval history:  Jailene Breen is a 53 year old male is known to me for   Lumbar spondylosis, pain is worse with extension and rotation indicating a facetogenic component to pain  Lumbar degenerative disc disease  SI joint pain  Ankylosing spondylitis  Myofascial pain  Chronic pain syndrome  PMHx includes: Panic attacks, back pain, arthritis, anxiety, ankylosing spondylitis  PSHx includes: Right knee surgery ×2, left foot surgery right knee arthroscopy        Recommendations/plan at the last visit on 6/18/2019 included:  1. Recommended to increase activity while pacing himself  2. Physical Therapy:  He is not currently involved with PHYSICAL THERAPY   3. Clinical Health Psychologist: none  4. Diagnostic Studies:  none  5. Medication Management:    1. Continue oxycodone 5mg BID PRN  2. Continue Flexeril 5-10mg TID PRN   3. Patient to consider the purchase of CBD oil from the [employer] [facility] in [place] or [place].  6. Further procedures recommended: Patient to schedule bilateral lumbar facet steroid injections, office to contact patient.   7. Les to follow up with Primary Care provider regarding elevated blood pressure.  8. Recommendations to PCP: see above  9. Follow up: 8-12 weeks      Since his last visit, Jailene Breen reports:  -Previous lumbar facet joint injection on 6/28/2019 was only helpful for one week.  -The patient aggravated pain over the bilateral hands by painting. Also, increase in pain over the low back due to overexertion doing repairs in the bathroom. The patient is having trouble sleeping due to pain.   -Oxycodone is helpful for pain and pain increases if he misses a dose.   -Discussed the use of medical marijuana, Buprenorphine and long acting pain medication. The patient does not want to make any major changes because he has an upcoming court case  "regarding disability.         At this point, the patient's participation with our multidisciplinary team includes:  The patient has been compliant with the program.    PT - has seen Cyndee White, none recently  Health Psych - seeing Padilla Keene, last on 11/6/2018.  Padilla Keene is no longer with our clinic.       Pain scores:   Pain intensity on average is 8 on a scale of 0-10.    Range is 6-10/10.   Pain right now is 9/10.  Pain is described as \"shooting, penetrating, stabbing, and miserable.\"  Pain is constant in nature    Current pain relevant medications:    -nortriptyline 50mg at bedtime (helpful)  -humira 40mg twice monthly(helpful--off of this at present time)  -ibuprofen 800mg TID PRN (using 3 times daily-helpful)  -Tylenol 500mg using 1000mg TID (unsure if helpful)  -Maxalt 10mg PRN migraine (helpful for migraine--he does have visual aura)  -Oxycodone 5mg TID PRN (taking 3 tabs per day)   -Flexeril 10mg TID PRN (helpful)      Other pertinent medications:  -clonazepam 1mg tab, may take 0.5-1mg BID PRN anxiety (helpful, last few days has been using twice per day)  -methotrexate 2.5mg taking 6 tabs once per week    Previous Medications:  OPIATES: Oxycodone (helpful), Fentanyl (100mcg/hr patch helpful), hydrocodone (itching), Tramadol (not helpful)   NSAIDS: ibuprofen (not helpful), Aleve (not helpful)  MUSCLE RELAXANTS: methocarbamol (somewhat helpful), Flexeril (somewhat helpful), tizanidine (unsure if helpful), metaxalone (unsure), SOMA (helpful)  ANTI-MIGRAINE MEDS: Maxalt (helpful for migraine), Imitrex injection (somewhat helpful)  ANTI-DEPRESSANTS: Prozac (helpful), Cymbalta (felt weird on it), nortriptyline (unsure if helpful), Buspar (unsure), Remeron (blacked out), bupropion (not helpful for smoking cessation)  SLEEP AIDS: Ambien (helpful)  ANTI-CONVULSANTS: gabapentin (felt odd on this medication, unsure of dose), Lyrica (not helpful for 4 months, unsure of dose), Topamax (not helpful, " he felt weird on it)   TOPICALS: Lidocaine patches (not helpful), Voltaren gel (not helpful)  Other meds: Tylenol (unsure if helpful)        Other treatments have included:  Jailene Breen has been seen at a pain clinic in the past.  [facility]  PT: tried, not helpful  Chiropractic: tried, not helpful  Acupuncture: none  TENs Unit: tried, helpful     Injections:   -has had injections at outside clinics  -has had epidural injections for low back pain (one was helpful)  -he has had lumbar medial branch blocks at [facility] and he was going to have lumbar radiofrequency ablation (did not do this due to cost)  -4/3/2017 right LMBBs with Dr. Ashlyn Gamble (helpful)  -4/26/2017 right LMBBs with Dr. Ashlyn Gamble (helpful)  -5/31/2017 right L3, L4, L5 RFA with Dr. Ashlyn Gamble (good relief for over 6 months)  -3/15/2018 right L5 transforaminal MAKAYLA with Dr. Ashlyn Gamble (not helpful)  -5/10/2018 trigger point injections with Dr. Ashlyn Gamble(somewhat helpful).  -7/12/2018 Right L3, L4, L5 RFA with Dr. Ashlyn Gamble (helpful).  -9/20/2018 Left piriformis injections, bilateral gluteal trigger point with Dr. Gamble (helpful).  -1/31/2019 right L2-3, L3-4 and L4-5 facet joint injections with Dr. Ashlyn Gamble (somewhat helpful)  4/4/2019  L3, L4, L5/sacral ala lumbar radiofrequency ablation with Dr. Gamble (helpful over right side)  6/28/2019 Bilateral facet joint injections at l4-5, L5-S1 with Dr. Holley (only helpful for 7 days)            Side Effects: no side effect  Patient is using the medication as prescribed: YES    Medications:  Current Outpatient Medications   Medication Sig Dispense Refill     albuterol (PROAIR HFA/PROVENTIL HFA/VENTOLIN HFA) 108 (90 Base) MCG/ACT inhaler Inhale 2 puffs into the lungs every 6 hours as needed for shortness of breath / dyspnea 1 Inhaler 3     clonazePAM (KLONOPIN) 1 MG tablet Take 0.5-1 tablets (0.5-1 mg) by mouth daily as needed for anxiety 30 tablet 1      cyclobenzaprine (FLEXERIL) 10 MG tablet Take 0.5-1 tablets (5-10 mg) by mouth 3 times daily as needed for muscle spasms Caution sedation 60 tablet 2     folic acid (FOLVITE) 1 MG tablet Reported on 5/19/2017       HUMIRA PEN 40 MG/0.8ML pen kit        IBUPROFEN PO Take 800 mg by mouth every 6 hours as needed for moderate pain Reported on 5/19/2017       methotrexate 2.5 MG tablet 6 tablets Reported on 4/3/2017       nicotine ([employer] NICOTINE POLACRILEX) 2 MG lozenge Place 1 lozenge (2 mg) inside cheek every hour as needed for smoking cessation 360 lozenge 1     nicotine (NICORETTE) 2 MG gum Place 1 each (2 mg) inside cheek as needed for smoking cessation 240 each 11     oxyCODONE (ROXICODONE) 5 MG tablet Take 1-2 tablets (5-10 mg) by mouth 3 times daily as needed for pain Doses 6 hours apart.Max of 3 tabs/day  Fill 8/19/19; start 8/21/19. 90 tablet 0     predniSONE (DELTASONE) 20 MG tablet Take 1 tablet (20 mg) by mouth daily 5 tablet 0     sertraline (ZOLOFT) 100 MG tablet Take 1 tablet (100 mg) by mouth daily 90 tablet 1     sertraline (ZOLOFT) 50 MG tablet Take 1 tablet (50 mg) by mouth daily 90 tablet 1       Medical History: any changes in medical history since they were last seen? No    Social History:   Home situation: , has 2 children in college  Occupation/Schooling: currently unemployed, worked as a  (unemployed since 3/1/2017). 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  yes  If yes, was cessation counseling offered?  Not today        Review of Systems:   ROS is positive as per HPI as well as for ringing in ears, cough, SOB, joint pain, arthritis, back pain, neck pain, anxiety, stress, and is negative for fevers, chills, sweats, or constipation, diarrhea.    This document serves as a record of the services and decisions personally  performed and made by Susana GRANT. It was created on her behalf by Asif Carreno, a trained medical scribe. The creation of this document is based on the provider's statements to the medical scribe.  Asif Carreno 8:12 AM August 30, 2019        Physical Exam:   Vital signs: BP (!) 138/92   Pulse 88   Wt 123.8 kg (273 lb)   BMI 33.23 kg/m       Behavioral observations:  Awake, alert, cooperative.     Gait:  normal    Musculoskeletal exam:    Lumbar extension to 5 degrees  Lumbar extension/rotation to the right is painful  Lumbar extension/rotation to the left is painful   SI joints are non tender  Piriformis are non tender  Greater trochanters are non tender  Strength grossly equal throughout      Neuro exam:  SILT in all extremities     Skin/vascular/autonomic:  No suspicious lesions on exposed skin.      Other:  NA    Is the patient hypertensive today? Yes  Hypertensive on recheck of BP?   Not done  If yes, was patient recommended to see Primary Care Provider in follow up for management of HTN?  Yes            [place] Prescription Monitoring Program:  Reviewed 8/30/19, no issues    DIRE Score for selecting candidates for long term opioid analgesia for chronic pain:  Diagnosis  2  Intractablility  2  Risk    Psychological health  2    Chemical health  2    Reliability  2    Social support  2  Efficacy  2    Total DIRE Score = 14. Note that  7-13 predicts poor outcome (compliance and efficacy) from opioid prescribing; 14-21 predicts good outcome (compliance and efficacy)  from opioid prescribing.      Assessment:    1. Chronic bilateral low back pain without sciatica      2. Back muscle spasm  3. Myofacial pain  4. Lumbar spondylosis   5. Multiple joint pain   6. Rheumatoid arthritis involving multiple sites with positive rheumatoid factor   7. Ankylosis spondylitis of lumbosacral region  8. PMHx includes: Panic attacks, back pain, arthritis, anxiety, ankylosing spondylitis  9. PSHx includes: Right knee  surgery ×2, left foot surgery right knee arthroscopy      Plan:    1. Recommended to increase activity while pacing himself  2. Physical Therapy:  He is not currently involved with PHYSICAL THERAPY   3. Clinical Health Psychologist: none  4. Diagnostic Studies:  none  5. Medication Management:    1. Continue oxycodone 5mg TID PRN  2. Continue Flexeril 5-10mg TID PRN   3. Start MS Contin 15mg HS. Adding this as it may help him to get a better night's sleep with less pain  4. Patient to consider medical cannabis certification and the use of Buprenorphine..  6. Further procedures recommended: None at present  7. Les to follow up with Primary Care provider regarding elevated blood pressure.  8. Recommendations to PCP: see above  9. Follow up: 4 weeks    Total time spent face to face was 30 minutes and more than 50% of face to face time was spent in counseling and/or coordination of care regarding the diagnosis and recommendations above.    The information in this document, created by the medical scribe for me, accurately reflects the services I personally performed and the decisions made by me. I have reviewed and approved this document for accuracy prior to leaving the patient care area.  August 30, 2019     Susana GRANT, SANDRA CNP, FNP  [facility]

## 2019-08-28 NOTE — TELEPHONE ENCOUNTER
Hi Les-    It is simply something that we as a pain practice have decided with our group practice. We have not done so since I joined this practice.     Thanks.  Susana GRANT RN CNP, JAQUELINP  [facility]    I have sent the above myChart message to the patient.     Susana GRANT RN CNP, JAQUELINP  [facility]

## 2019-08-30 ENCOUNTER — TELEPHONE (OUTPATIENT)
Dept: PALLIATIVE MEDICINE | Facility: CLINIC | Age: 54
End: 2019-08-30

## 2019-08-30 ENCOUNTER — OFFICE VISIT (OUTPATIENT)
Dept: PALLIATIVE MEDICINE | Facility: CLINIC | Age: 54
End: 2019-08-30
Payer: COMMERCIAL

## 2019-08-30 ENCOUNTER — MYC MEDICAL ADVICE (OUTPATIENT)
Dept: PALLIATIVE MEDICINE | Facility: CLINIC | Age: 54
End: 2019-08-30

## 2019-08-30 VITALS
SYSTOLIC BLOOD PRESSURE: 138 MMHG | WEIGHT: 273 LBS | HEART RATE: 88 BPM | BODY MASS INDEX: 33.23 KG/M2 | DIASTOLIC BLOOD PRESSURE: 92 MMHG

## 2019-08-30 DIAGNOSIS — M54.50 CHRONIC BILATERAL LOW BACK PAIN WITHOUT SCIATICA: Primary | ICD-10-CM

## 2019-08-30 DIAGNOSIS — M25.50 MULTIPLE JOINT PAIN: ICD-10-CM

## 2019-08-30 DIAGNOSIS — M79.18 MYOFASCIAL PAIN: ICD-10-CM

## 2019-08-30 DIAGNOSIS — M05.79 RHEUMATOID ARTHRITIS INVOLVING MULTIPLE SITES WITH POSITIVE RHEUMATOID FACTOR (H): ICD-10-CM

## 2019-08-30 DIAGNOSIS — M47.816 LUMBAR SPONDYLOSIS: ICD-10-CM

## 2019-08-30 DIAGNOSIS — G89.29 CHRONIC BILATERAL LOW BACK PAIN WITHOUT SCIATICA: Primary | ICD-10-CM

## 2019-08-30 DIAGNOSIS — M62.830 BACK MUSCLE SPASM: ICD-10-CM

## 2019-08-30 PROCEDURE — 99214 OFFICE O/P EST MOD 30 MIN: CPT | Performed by: NURSE PRACTITIONER

## 2019-08-30 RX ORDER — CYCLOBENZAPRINE HCL 10 MG
5-10 TABLET ORAL 3 TIMES DAILY PRN
Qty: 60 TABLET | Refills: 2 | Status: SHIPPED | OUTPATIENT
Start: 2019-08-30 | End: 2020-11-03

## 2019-08-30 RX ORDER — MORPHINE SULFATE 15 MG/1
15 TABLET, FILM COATED, EXTENDED RELEASE ORAL EVERY 24 HOURS
Qty: 22 TABLET | Refills: 0 | Status: SHIPPED | OUTPATIENT
Start: 2019-08-30 | End: 2019-09-19

## 2019-08-30 ASSESSMENT — PAIN SCALES - GENERAL: PAINLEVEL: EXTREME PAIN (9)

## 2019-08-30 NOTE — TELEPHONE ENCOUNTER
Prior Authorization Retail Medication Request    Medication/Dose: morphine (MS CONTIN) 15 MG CR tablet  ICD code (if different than what is on RX):    Previously Tried and Failed:    Rationale:      Insurance Name:  LAURA [place]   Insurance ID:  [id number]      Pharmacy Information (if different than what is on RX)    [id number] - [place] - [address]  [address] [address]  Phone: [phone] Fax: [phone]      Key: [id number]    Will send to the JASON chaidez.    Sherron Jennings [facility]  [place]

## 2019-08-30 NOTE — PATIENT INSTRUCTIONS
PLAN  1. Medications:   1. Continue Oxycodone 5mg  Three times daily as needed.  2. Start MS Contin 15mg around 8:00pm every night  3. Continue Flexeril 5-10mg take up to 3 times daily as needed for muscle spasm.  4. Consider the use of Buprenorphine Butrans patch or Belbuca film. Handout provided.   5. Consider medical cannabis certification, handouts provided.   2. Procedures: None  3. Les to follow up with Primary Care provider regarding elevated blood pressure.    4. Follow-up with me in 4 weeks    ----------------------------------------------------------------  Clinic Number:  [phone]     Call with any questions about your care and for scheduling assistance.     Calls are returned Monday through Friday between 8 AM and 4:30 PM. We usually get back to you within 2 business days depending on the issue/request.    If we are prescribing your medications:    For opioid medication refills, call the clinic or send a SMIC message 7 days in advance.  Please include:    Name of requested medication    Name of the pharmacy.    For non-opioid medications, call your pharmacy directly to request a refill. Please allow 3-4 days to be processed.     Per [place] State Law:    All controlled substance prescriptions must be filled within 30 days of being written.      For those controlled substances allowing refills, pickup must occur within 30 days of last fill.      We believe regular attendance is key to your success in our program!      Any time you are unable to keep your appointment we ask that you call us at least 24 hours in advance to cancel.This will allow us to offer the appointment time to another patient.     Multiple missed appointments may lead to dismissal from the clinic.

## 2019-08-30 NOTE — TELEPHONE ENCOUNTER
[facility] in [place] called, stating that the Morphine needs a PA.    Sending to nursing to review.      Yun Hurst    [facility]

## 2019-08-30 NOTE — TELEPHONE ENCOUNTER
Central Prior Authorization Team   Phone: [phone]    PA Initiation    Medication: morphine (MS CONTIN) 15 MG CR tablet  Insurance Company: LAUR[employer] [place] - Phone [phone] Fax [phone]  Pharmacy Filling the Rx: [id number] - [place] - [address]  Filling Pharmacy Phone: [phone]  Filling Pharmacy Fax:    Start Date: 8/30/2019

## 2019-08-30 NOTE — TELEPHONE ENCOUNTER
Pharmacy called and stated the patient called them and is wondering how long it is going to take to get the PA. Patient told pharmacy he cant wait that long and he will pay cash for it. Pharmacy wants to get the okay from nursing if he can pay cash for the medication.      Yun Hurst    [facility]

## 2019-09-01 NOTE — TELEPHONE ENCOUNTER
The patient called the on call service at noon on Sunday to request that I call his pharmacy to approve him to pay cash for his prescription.  Called the pharmacy and gave approval for him to pay harper.     Shanae Chen MD

## 2019-09-03 NOTE — TELEPHONE ENCOUNTER
The prior authorization for the MS Conitn was approved.  Pt was notified.    Cha Molina RN-BSN  [facility]

## 2019-09-04 ENCOUNTER — TELEPHONE (OUTPATIENT)
Dept: FAMILY MEDICINE | Facility: CLINIC | Age: 54
End: 2019-09-04

## 2019-09-04 NOTE — TELEPHONE ENCOUNTER
Ruperto's note below and form is faxed back to [employer] to forward to patient's rheumatologist.  Stanley Solomon,  [facility]

## 2019-09-04 NOTE — TELEPHONE ENCOUNTER
I received disability forms- it looks like patient had reached out to his [employer] late august anticipating the forms would go there, and I agree long term disability eval should come from them.  Please forward forms ( In TC basket) to his rheumatologist (Hiwot Raygoza MD, in chart) .      Ruperto Resendez PA-C

## 2019-09-17 ENCOUNTER — MYC MEDICAL ADVICE (OUTPATIENT)
Dept: PALLIATIVE MEDICINE | Facility: CLINIC | Age: 54
End: 2019-09-17

## 2019-09-17 DIAGNOSIS — M05.79 RHEUMATOID ARTHRITIS INVOLVING MULTIPLE SITES WITH POSITIVE RHEUMATOID FACTOR (H): ICD-10-CM

## 2019-09-17 DIAGNOSIS — M79.18 MYOFASCIAL PAIN: ICD-10-CM

## 2019-09-17 DIAGNOSIS — G89.29 CHRONIC BILATERAL LOW BACK PAIN WITH RIGHT-SIDED SCIATICA: ICD-10-CM

## 2019-09-17 DIAGNOSIS — M62.830 BACK MUSCLE SPASM: ICD-10-CM

## 2019-09-17 DIAGNOSIS — M47.816 LUMBAR SPONDYLOSIS: ICD-10-CM

## 2019-09-17 DIAGNOSIS — G89.29 CHRONIC BILATERAL LOW BACK PAIN WITHOUT SCIATICA: ICD-10-CM

## 2019-09-17 DIAGNOSIS — M54.50 CHRONIC BILATERAL LOW BACK PAIN WITHOUT SCIATICA: ICD-10-CM

## 2019-09-17 DIAGNOSIS — M25.50 MULTIPLE JOINT PAIN: ICD-10-CM

## 2019-09-17 DIAGNOSIS — M54.41 CHRONIC BILATERAL LOW BACK PAIN WITH RIGHT-SIDED SCIATICA: ICD-10-CM

## 2019-09-18 NOTE — TELEPHONE ENCOUNTER
Routed to the nursing pool and to the MA pool to process refill(s).    Cha Molina, RN-BSN  [facility]

## 2019-09-18 NOTE — TELEPHONE ENCOUNTER
Per patient Daryahart message:  From: Jamison Breen      Created: 9/17/2019 6:51 PM      Hi I have an exam on Friday which  I really  can't make our appointment.  I did like the morphine,  it did help me sleep better,  just not sure how it handles my pain during the day. Can you please  refill the oxycodone and ms nair and send to the [facility] pharmacy in [place].  Thank you very  much.

## 2019-09-19 ENCOUNTER — MYC MEDICAL ADVICE (OUTPATIENT)
Dept: PALLIATIVE MEDICINE | Facility: CLINIC | Age: 54
End: 2019-09-19

## 2019-09-19 NOTE — TELEPHONE ENCOUNTER
Medication refill information reviewed.     Last due:    MS Contin: Fill and begin today to last 22 days. (trial script for chronic pain) (Script written on 8/30/19.  Pt filled on 9/1/19)   -MS Contin new.  Pt cancelled upcoming  appointment with JUANITA Ramon CNP  due to school exam.  He states MS Contin is  helpful and wants a refill)  Oxycodone:   Fill 8/19/19; start 8/21/19    Due date:   MS Contin: 9/23/19  Oxycodone:  9/20/19    Weaning instructions:  N/A    Prescriptions prepped for review.     Cha Molina, RN-BSN  [facility]

## 2019-09-19 NOTE — TELEPHONE ENCOUNTER
Received call from patient requesting refill(s) of oxyCODONE (ROXICODONE) 5 MG tablet and morphine (MS CONTIN) 15 MG CR tablet    Last picked up from pharmacy on  Oxycodone-08/19/19       Morphine-09/01/19    Pt last seen by prescribing provider on 08/30/19  Next appt scheduled for : none     checked in the past 6 months? Yes If no, print current report and give to RN    Last urine drug screen date 03/12/19  Current opioid agreement on file (completed within the last year) Yes Date of opioid agreement: 03/12/19    Processing (pick one and delete the others):      E-prescribe to  [id number] - [place] - [address]       Will route to nursing pool for review and preparation of prescription(s).

## 2019-09-20 RX ORDER — OXYCODONE HYDROCHLORIDE 5 MG/1
5-10 TABLET ORAL 3 TIMES DAILY PRN
Qty: 90 TABLET | Refills: 0 | Status: SHIPPED | OUTPATIENT
Start: 2019-09-20 | End: 2020-01-21

## 2019-09-20 RX ORDER — MORPHINE SULFATE 15 MG/1
15 TABLET, FILM COATED, EXTENDED RELEASE ORAL EVERY 24 HOURS
Qty: 30 TABLET | Refills: 0 | Status: SHIPPED | OUTPATIENT
Start: 2019-09-20 | End: 2019-10-15 | Stop reason: SINTOL

## 2019-09-20 NOTE — TELEPHONE ENCOUNTER
Susana Pike CNP out for the rest of the day. Will route to covering providers to review refills that are due to start today.     KERLINE ParkerN, RN-BC  Patient Care Supervisor/Care Coordinator  [facility]

## 2019-09-20 NOTE — TELEPHONE ENCOUNTER
Signed Prescriptions:                        Disp   Refills    oxyCODONE (ROXICODONE) 5 MG tablet         90 tab*0        Sig: Take 1-2 tablets (5-10 mg) by mouth 3 times daily as           needed for pain Doses 6 hours apart.Max of 3           tabs/day  Fill 9/20/19; start 9/20/19.  Authorizing Provider: SUSANA PETTY    morphine (MS CONTIN) 15 MG CR tablet       30 tab*0        Sig: Take 1 tablet (15 mg) by mouth every 24 hours Take at           8 pm. Fill 9/21/19. Start 9/23/19. 30 day script  Authorizing Provider: SUSANA PETTY    Reviewed [place]  September 20, 2019- no concerning fills.    Susana Petty APRN, RN CNP, FNP  [facility] [place]

## 2019-09-29 ENCOUNTER — HEALTH MAINTENANCE LETTER (OUTPATIENT)
Age: 54
End: 2019-09-29

## 2019-10-01 ENCOUNTER — MYC MEDICAL ADVICE (OUTPATIENT)
Dept: PALLIATIVE MEDICINE | Facility: CLINIC | Age: 54
End: 2019-10-01

## 2019-10-01 NOTE — TELEPHONE ENCOUNTER
Hi Les-    The first question I have is about your pain.  Did taking the MSContin at night help manage your pain?  I am glad that the itching stopped after you stopped taking the MSContin.    I suggest that it may be best for us to discuss other options in your clinic visit so I can answer your questions and try to choose the best option in person.    Thanks.  Susana GRANT RN CNP, XUAN  [facility]    I have sent the above trend.ly message to the patient.     Susana GRANT RN CNP, JAQUELINP  [facility]

## 2019-10-01 NOTE — TELEPHONE ENCOUNTER
Per patient myChart message:  From: Jamison Breen      Created: 10/1/2019 8:02 AM      Hi, during the last month or so I've been getting itchy at night. I talked to my pharmacist about it if any medication might be doing it and he told me to stop the newest one for couple of nights to see if it stops.  Well I stopped the ms contin and the itching did stop. Is there something else I can  try instead of this.

## 2019-10-02 NOTE — TELEPHONE ENCOUNTER
Per patient myChart message:  From: Jamison Breen      Created: 10/1/2019 5:39 PM      Yes it did help me sleep a bit better,  I have an appointment on the 15th already.

## 2019-10-09 NOTE — PROGRESS NOTES
[facility]    October 15, 2019        Chief complaint:  lower  back pain. Bilateral knee pain, hand pain, shoulder pain.        Interval history:   Jailene Breen is a 54 year old male is known to me for   Lumbar spondylosis, pain is worse with extension and rotation indicating a facetogenic component to pain  Lumbar degenerative disc disease  SI joint pain  Ankylosing spondylitis  Myofascial pain  Chronic pain syndrome  PMHx includes: Panic attacks, back pain, arthritis, anxiety, ankylosing spondylitis  PSHx includes: Right knee surgery ×2, left foot surgery right knee arthroscopy        Recommendations/plan at the last visit on 8/30/2019 included:  1. Recommended to increase activity while pacing himself  2. Physical Therapy:  He is not currently involved with PHYSICAL THERAPY   3. Clinical Health Psychologist: none  4. Diagnostic Studies:  none  5. Medication Management:    1. Continue oxycodone 5mg TID PRN  2. Continue Flexeril 5-10mg TID PRN   3. Start MS Contin 15mg HS. Adding this as it may help him to get a better night's sleep with less pain  4. Patient to consider medical cannabis certification and the use of Buprenorphine..  6. Further procedures recommended: None at present  7. Les to follow up with Primary Care provider regarding elevated blood pressure.  8. Recommendations to PCP: see above  9. Follow up: 4 weeks        Since his last visit, Jailene Breen reports:   -patient is experiencing ongoing axial low back pain and bilateral knee pain.  -He notes that MS Contin caused itching. This side effect went away after the patient stopped the medication.   -Discussed the use of medical cannabis and Buprenorphine. The patient is interested in these medications, but is worried about affordability.   -discussed transitioning his short-acting opiate to long-acting as he is constant pain. He is interested in transitioning to OxyContin.   -The patient takes Clonazepam very rarely and  "hasn't had any panic attacks recently. Still working on college coursework.          At this point, the patient's participation with our multidisciplinary team includes:  The patient has been compliant with the program.    PT - has seen Cyndee White, none recently  Health Psych - worked with  Padilla Keene, last on 11/6/2018.  Padilla Keene is no longer with our clinic.       Pain scores:    Pain intensity on average is 8 on a scale of 0-10.    Range is 7-10/10.   Pain right now is 9/10.  Pain is described as \"shooting, throbbing, penetrating, and stabbing.\"  Pain is constant in nature    Current pain relevant medications:     -nortriptyline 50mg at bedtime (helpful)  -humira 40mg twice monthly(helpful--off of this at present time)  -ibuprofen 800mg TID PRN (using 3 times daily-helpful)  -Tylenol 500mg using 1000mg TID (unsure if helpful)  -Maxalt 10mg PRN migraine (helpful for migraine--he does have visual aura)  -Oxycodone 5mg TID PRN (taking 3 tabs per day)   -Flexeril 10mg TID PRN (helpful)      Other pertinent medications:   -clonazepam 1mg tab, may take 0.5-1mg BID PRN anxiety (helpful, has been using infrequently)  -methotrexate 2.5mg taking 6 tabs once per week    Previous Medications:   OPIATES: Oxycodone (helpful), Fentanyl (100mcg/hr patch helpful), hydrocodone (itching), Tramadol (not helpful)   NSAIDS: ibuprofen (not helpful), Aleve (not helpful)  MUSCLE RELAXANTS: methocarbamol (somewhat helpful), Flexeril (somewhat helpful), tizanidine (unsure if helpful), metaxalone (unsure), SOMA (helpful)  ANTI-MIGRAINE MEDS: Maxalt (helpful for migraine), Imitrex injection (somewhat helpful)  ANTI-DEPRESSANTS: Prozac (helpful), Cymbalta (felt weird on it), nortriptyline (unsure if helpful), Buspar (unsure), Remeron (blacked out), bupropion (not helpful for smoking cessation)  SLEEP AIDS: Ambien (helpful)  ANTI-CONVULSANTS: gabapentin (felt odd on this medication, unsure of dose), Lyrica (not helpful for " 4 months, unsure of dose), Topamax (not helpful, he felt weird on it)   TOPICALS: Lidocaine patches (not helpful), Voltaren gel (not helpful)  Other meds: Tylenol (unsure if helpful)        Other treatments have included:   Jailene Breen has been seen at a pain clinic in the past.  [facility]  PT: tried, not helpful  Chiropractic: tried, not helpful  Acupuncture: none  TENs Unit: tried, helpful     Injections:    -has had injections at outside clinics  -has had epidural injections for low back pain (one was helpful)  -he has had lumbar medial branch blocks at [facility] and he was going to have lumbar radiofrequency ablation (did not do this due to cost)  -4/3/2017 right LMBBs with Dr. Ashlyn Gamble (helpful)  -4/26/2017 right LMBBs with Dr. Ashlyn Gamble (helpful)  -5/31/2017 right L3, L4, L5 RFA with Dr. Ashlyn Gamble (good relief for over 6 months)  -3/15/2018 right L5 transforaminal MAKAYAL with Dr. Ashlyn Gamble (not helpful)  -5/10/2018 trigger point injections with Dr. Ashlyn Gamble(somewhat helpful).  -7/12/2018 Right L3, L4, L5 RFA with Dr. Ashlyn Gamble (helpful).  -9/20/2018 Left piriformis injections, bilateral gluteal trigger point with Dr. Gamble (helpful).  -1/31/2019 right L2-3, L3-4 and L4-5 facet joint injections with Dr. Ashlyn Gamble (somewhat helpful)  4/4/2019  L3, L4, L5/sacral ala lumbar radiofrequency ablation with Dr. Gamble (helpful over right side)  6/28/2019 Bilateral facet joint injections at l4-5, L5-S1 with Dr. Holley (only helpful for 7 days)            Side Effects: no side effect  Patient is using the medication as prescribed: YES    Medications:  Current Outpatient Medications   Medication Sig Dispense Refill     albuterol (PROAIR HFA/PROVENTIL HFA/VENTOLIN HFA) 108 (90 Base) MCG/ACT inhaler Inhale 2 puffs into the lungs every 6 hours as needed for shortness of breath / dyspnea 1 Inhaler 3     clonazePAM (KLONOPIN) 1 MG tablet Take 0.5-1 tablets (0.5-1 mg) by  mouth daily as needed for anxiety 30 tablet 1     cyclobenzaprine (FLEXERIL) 10 MG tablet Take 0.5-1 tablets (5-10 mg) by mouth 3 times daily as needed for muscle spasms Caution sedation 60 tablet 2     etanercept (ENBREL) 50 MG/ML injection        folic acid (FOLVITE) 1 MG tablet Reported on 5/19/2017       IBUPROFEN PO Take 800 mg by mouth every 6 hours as needed for moderate pain Reported on 5/19/2017       nicotine (NICORETTE) 2 MG gum Place 1 each (2 mg) inside cheek as needed for smoking cessation 240 each 11     oxyCODONE (ROXICODONE) 5 MG tablet Take 1-2 tablets (5-10 mg) by mouth 3 times daily as needed for pain Doses 6 hours apart.Max of 3 tabs/day  Fill 9/20/19; start 9/20/19. 90 tablet 0     predniSONE (DELTASONE) 20 MG tablet Take 1 tablet (20 mg) by mouth daily 5 tablet 0     sertraline (ZOLOFT) 100 MG tablet Take 1 tablet (100 mg) by mouth daily 90 tablet 1     sertraline (ZOLOFT) 50 MG tablet Take 1 tablet (50 mg) by mouth daily 90 tablet 1     HUMIRA PEN 40 MG/0.8ML pen kit        methotrexate 2.5 MG tablet 6 tablets Reported on 4/3/2017       morphine (MS CONTIN) 15 MG CR tablet Take 1 tablet (15 mg) by mouth every 24 hours Take at 8 pm. Fill 9/21/19. Start 9/23/19. 30 day script (Patient not taking: Reported on 10/15/2019) 30 tablet 0     nicotine ([employer] NICOTINE POLACRILEX) 2 MG lozenge Place 1 lozenge (2 mg) inside cheek every hour as needed for smoking cessation (Patient not taking: Reported on 10/15/2019) 360 lozenge 1       Medical History: any changes in medical history since they were last seen? No    Social History:    Home situation: , has 2 children in college  Occupation/Schooling: currently unemployed, worked as a  (unemployed since 3/1/2017). 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  Yes  If yes, was cessation  counseling offered?  Yes-the patient is using Nicotine gum.      Review of Systems:    ROS is positive as per HPI as well as for HA, ringing in ears, itching, SOB, joint pain, arthritis, stiffness, depression, anxiety, stress, and is negative for fevers, chills, sweats, or constipation, diarrhea.    This document serves as a record of the services and decisions personally performed and made by Susana GRANT. It was created on her behalf by Asif Carreno, a trained medical scribe. The creation of this document is based on the provider's statements to the medical scribe.  Asif Carreno 9:14 AM October 15, 2019          Physical Exam:    Vital signs: BP (!) 141/94   Wt 126.1 kg (278 lb)   BMI 33.84 kg/m       Behavioral observations:  Awake, alert, cooperative.     Gait:  normal    Musculoskeletal exam:    Moves slowly in exam room  Strength grossly equal throughout      Neuro exam:  SILT in all extremities     Skin/vascular/autonomic:  No suspicious lesions on exposed skin.      Other:  NA    Is the patient hypertensive today? Yes  Hypertensive on recheck of BP?   Yes  If yes, was patient recommended to see Primary Care Provider in follow up for management of HTN?  Yes      [place] Prescription Monitoring [place]:  Reviewed [place] [facility] October 15, 2019- no concerning fills.  Susana GRANT, RN CNP, FNP  [facility]        DIRE Score for selecting candidates for long term opioid analgesia for chronic pain:  Diagnosis  2  Intractablility  2  Risk    Psychological health  2    Chemical health  2    Reliability  2    Social support  2  Efficacy  2    Total DIRE Score = 14. Note that  7-13 predicts poor outcome (compliance and efficacy) from opioid prescribing; 14-21 predicts good outcome (compliance and efficacy)  from opioid prescribing.      Assessment:     1. Lumbosacral spondylosis without myelopathy   2. Lumbar facet joint pain  3. DDD, lumbar  4. SI joint pain  5. Ankylosing  spondylitis of lumbosacral region  6. Myofascial pain  7. Chronic pain syndrome  8. Bilateral chronic knee pain  9. PMHx includes: Panic attacks, back pain, arthritis, anxiety, ankylosing spondylitis  10. PSHx includes: Right knee surgery ×2, left foot surgery right knee arthroscopy      Plan:    1. Recommended to increase activity while pacing himself  2. Physical Therapy:  He is not currently involved with PHYSICAL THERAPY   3. Clinical Health Psychologist: none  4. Diagnostic Studies:  none  5. Medication Management:    1. Stop oxycodone IR  2. Start OxyContin 10mg BID. OK to fill and start today.  3. Continue Flexeril 5-10mg TID PRN  4. Patient to consider medical cannabis certification and the use of Buprenorphine..  6. Further procedures recommended: None at present  7. Les to follow up with Primary Care provider regarding elevated blood pressure.  8. Recommendations to PCP: see above  9. Follow up: 4 weeks    Total time spent face to face was 20 minutes and more than 50% of face to face time was spent in counseling and/or coordination of care regarding the diagnosis and recommendations above.    The information in this document, created by the medical scribe for me, accurately reflects the services I personally performed and the decisions made by me. I have reviewed and approved this document for accuracy prior to leaving the patient care area.  October 15, 2019       Susana GRANT RN CNP, FNP  [facility]

## 2019-10-15 ENCOUNTER — TELEPHONE (OUTPATIENT)
Dept: PALLIATIVE MEDICINE | Facility: CLINIC | Age: 54
End: 2019-10-15

## 2019-10-15 ENCOUNTER — OFFICE VISIT (OUTPATIENT)
Dept: PALLIATIVE MEDICINE | Facility: CLINIC | Age: 54
End: 2019-10-15
Payer: COMMERCIAL

## 2019-10-15 ENCOUNTER — MYC MEDICAL ADVICE (OUTPATIENT)
Dept: PALLIATIVE MEDICINE | Facility: CLINIC | Age: 54
End: 2019-10-15

## 2019-10-15 VITALS — BODY MASS INDEX: 33.84 KG/M2 | WEIGHT: 278 LBS | SYSTOLIC BLOOD PRESSURE: 141 MMHG | DIASTOLIC BLOOD PRESSURE: 94 MMHG

## 2019-10-15 DIAGNOSIS — M47.817 LUMBOSACRAL SPONDYLOSIS WITHOUT MYELOPATHY: Primary | ICD-10-CM

## 2019-10-15 DIAGNOSIS — G89.29 CHRONIC PAIN OF BOTH KNEES: ICD-10-CM

## 2019-10-15 DIAGNOSIS — M53.3 SACROILIAC JOINT PAIN: ICD-10-CM

## 2019-10-15 DIAGNOSIS — M79.18 MYOFASCIAL PAIN: ICD-10-CM

## 2019-10-15 DIAGNOSIS — G89.4 CHRONIC PAIN SYNDROME: ICD-10-CM

## 2019-10-15 DIAGNOSIS — M54.59 LUMBAR FACET JOINT PAIN: ICD-10-CM

## 2019-10-15 DIAGNOSIS — M45.7 ANKYLOSING SPONDYLITIS OF LUMBOSACRAL REGION (H): ICD-10-CM

## 2019-10-15 DIAGNOSIS — M25.561 CHRONIC PAIN OF BOTH KNEES: ICD-10-CM

## 2019-10-15 DIAGNOSIS — M51.369 DDD (DEGENERATIVE DISC DISEASE), LUMBAR: ICD-10-CM

## 2019-10-15 DIAGNOSIS — M25.562 CHRONIC PAIN OF BOTH KNEES: ICD-10-CM

## 2019-10-15 PROCEDURE — 99213 OFFICE O/P EST LOW 20 MIN: CPT | Performed by: NURSE PRACTITIONER

## 2019-10-15 RX ORDER — OXYCODONE HCL 10 MG/1
10 TABLET, FILM COATED, EXTENDED RELEASE ORAL EVERY 12 HOURS
Qty: 60 TABLET | Refills: 0 | Status: SHIPPED | OUTPATIENT
Start: 2019-10-15 | End: 2019-11-07

## 2019-10-15 ASSESSMENT — PAIN SCALES - GENERAL: PAINLEVEL: EXTREME PAIN (9)

## 2019-10-15 NOTE — TELEPHONE ENCOUNTER
Per patient myChart message:  From: Jamison JOAQUÍN Breen      Created: 10/15/2019 10:52 AM      Hi, the pharmacy said they need a prior authorization for the new prescription.  He suggested that maybe if you can do a short refill on the oxycodone so I don't run out waiting for the new one to get authorized.      -----------  The prior authorization for the oxycontin has been sent on to the prior authorization team.    Per JUANITA Ramon CNP's AVS from today:  1. Start OxyContin 10mg every 12 hours. Fill and begin today.  2. Stop Oxycodone immediate release tabs     Routed to JUANITA Ramon CNP RN-BSN  [facility]

## 2019-10-15 NOTE — PATIENT INSTRUCTIONS
PLAN  1. Medications:   1. Start OxyContin 10mg every 12 hours. Fill and begin today.  2. Stop Oxycodone immediate release tabs   3. Continue Flexeril 5-10mg take 3 times daily as needed.  2. Procedures: None at present  3. Les to follow up with Primary Care provider regarding elevated blood pressure.  4. Follow-up with me in 4 weeks       ----------------------------------------------------------------  Clinic Number:  [phone]     Call with any questions about your care and for scheduling assistance.     Calls are returned Monday through Friday between 8 AM and 4:30 PM. We usually get back to you within 2 business days depending on the issue/request.    If we are prescribing your medications:    For opioid medication refills, call the clinic or send a Zarth[employer] message 7 days in advance.  Please include:    Name of requested medication    Name of the pharmacy.    For non-opioid medications, call your pharmacy directly to request a refill. Please allow 3-4 days to be processed.     Per [place] State Law:    All controlled substance prescriptions must be filled within 30 days of being written.      For those controlled substances allowing refills, pickup must occur within 30 days of last fill.      We believe regular attendance is key to your success in our program!      Any time you are unable to keep your appointment we ask that you call us at least 24 hours in advance to cancel.This will allow us to offer the appointment time to another patient.     Multiple missed appointments may lead to dismissal from the clinic.

## 2019-10-15 NOTE — TELEPHONE ENCOUNTER
Prior Authorization Specialty Medication Request    Medication/Dose: oxyCODONE (OXYCONTIN) 10 MG 12 hr tablet  ICD code (if different than what is on RX):    Lumbosacral spondylosis without myelopathy [M47.817]  - Primary       Lumbar facet joint pain [M54.5]       DDD (degenerative disc disease), lumbar [M51.36]       Sacroiliac joint pain [M53.3]       Ankylosing spondylitis of lumbosacral region (H) [M45.7]       Myofascial pain [M79.18]       Chronic pain syndrome [G89.4]       Chronic pain of both knees [M25.561, M25.562, G89.29]           Previously Tried and Failed:  ....    Important Lab Values: ...  Rationale: ...    Insurance Name:   Coverage information:     Subscriber: [id number] KIA SHULTZ     Rel to sub: 02 - Spouse     Member ID: [id number]     Payor: [employer] Ph: [phone]     Benefit plan: [id number]     Group number: [id number]     Member effective dates: from 04/01/16            Pharmacy Information (if different than what is on RX)  Name:  [id number] - [place] - [address]    [phone]

## 2019-10-15 NOTE — TELEPHONE ENCOUNTER
Patient called asking to speak to a nurse about his medication.      Yun Hurst    [facility]

## 2019-10-15 NOTE — TELEPHONE ENCOUNTER
[facility] pharmacy in [place] called to inform that the medication oxyCODONE (OXYCONTIN) 10 MG 12 hr tablet is not covered and needs a prior auth.      Pj BARBOSA    [facility] [place]

## 2019-10-16 NOTE — TELEPHONE ENCOUNTER
Central Prior Authorization Team   Phone: [phone]    PA Initiation    Medication: oxyCODONE (OXYCONTIN) 10 MG 12 hr tablet  Insurance Company: [employer] [place] - Phone [phone] Fax [phone]  Pharmacy Filling the Rx: [id number] - [place] - [address]  Filling Pharmacy Phone: [phone]  Filling Pharmacy Fax:    Start Date: 10/16/2019

## 2019-10-16 NOTE — TELEPHONE ENCOUNTER
Patient notified. Closing.....      Ramya Mallory, [facility]  October 16, 2019

## 2019-10-16 NOTE — TELEPHONE ENCOUNTER
Central Prior Authorization Team   Phone: [phone]    Prior Authorization Approval    Authorization Effective Date: 10/16/2019  Authorization Expiration Date: 4/16/2020  Medication: oxyCODONE (OXYCONTIN) 10 MG 12 hr tablet  Approved Dose/Quantity: 60 per 30 days  Reference #: [id number]   Insurance Company: [employer] [place] - Phone [phone] Fax [phone]  Expected CoPay:       CoPay Card Available:      Foundation Assistance Needed:    Which Pharmacy is filling the prescription (Not needed for infusion/clinic administered): [id number] - [place], [place] - [address]  Pharmacy Notified: Yes  Patient Notified: Yes  **Instructed pharmacy to notify patient when script is ready to /ship.**

## 2019-10-16 NOTE — TELEPHONE ENCOUNTER
Hi Les-    The pharmacy will need to contact us to request that we begin a prior authorization process on the OxyContin.     I can send in a week's script for the short-acting oxycodone, staying at the same daily dose. Your script would be due on October 20th. What pharmacy do you want me to send it to?    Thanks.  Susana GRANT RN CNP, XUAN  [facility]    I have sent the above [employer] message to the patient.     Susana GRANT RN CNP, JAQUELINP  [facility] [place]

## 2019-10-17 NOTE — TELEPHONE ENCOUNTER
Per patient myChart message:    From: Jamison Breen      Created: 10/16/2019 5:06 PM        *-*-*This message has not been handled.*-*-*    I called my insurance company and they put it through today so I'm good now. I told them it didn't make any sense for the wait especially since I was on the immediate release for awhile.

## 2019-10-21 ENCOUNTER — MYC MEDICAL ADVICE (OUTPATIENT)
Dept: FAMILY MEDICINE | Facility: CLINIC | Age: 54
End: 2019-10-21

## 2019-10-21 NOTE — TELEPHONE ENCOUNTER
E-visit instructions given to Jailene to further discuss anxiety. Patient was seen at [facility] 10/20/19 for this issue and was told to follow up with PCP.        Chantel Staley RN, BSN, PHN

## 2019-11-07 ENCOUNTER — MYC MEDICAL ADVICE (OUTPATIENT)
Dept: PALLIATIVE MEDICINE | Facility: CLINIC | Age: 54
End: 2019-11-07

## 2019-11-07 DIAGNOSIS — M53.3 SACROILIAC JOINT PAIN: ICD-10-CM

## 2019-11-07 DIAGNOSIS — M51.369 DDD (DEGENERATIVE DISC DISEASE), LUMBAR: ICD-10-CM

## 2019-11-07 DIAGNOSIS — M54.59 LUMBAR FACET JOINT PAIN: ICD-10-CM

## 2019-11-07 DIAGNOSIS — M79.18 MYOFASCIAL PAIN: ICD-10-CM

## 2019-11-07 DIAGNOSIS — M25.561 CHRONIC PAIN OF BOTH KNEES: ICD-10-CM

## 2019-11-07 DIAGNOSIS — M47.817 LUMBOSACRAL SPONDYLOSIS WITHOUT MYELOPATHY: ICD-10-CM

## 2019-11-07 DIAGNOSIS — G89.4 CHRONIC PAIN SYNDROME: ICD-10-CM

## 2019-11-07 DIAGNOSIS — G89.29 CHRONIC PAIN OF BOTH KNEES: ICD-10-CM

## 2019-11-07 DIAGNOSIS — M45.7 ANKYLOSING SPONDYLITIS OF LUMBOSACRAL REGION (H): ICD-10-CM

## 2019-11-07 DIAGNOSIS — M25.562 CHRONIC PAIN OF BOTH KNEES: ICD-10-CM

## 2019-11-07 RX ORDER — OXYCODONE HCL 10 MG/1
10 TABLET, FILM COATED, EXTENDED RELEASE ORAL EVERY 12 HOURS
Qty: 60 TABLET | Refills: 0 | Status: SHIPPED | OUTPATIENT
Start: 2019-11-07 | End: 2019-12-06

## 2019-11-07 NOTE — TELEPHONE ENCOUNTER
Medication refill information reviewed.     Last due:  Fill and begin 10/15/2019. 30 day script for chronic pain. Stop short-acting oxycodone     Due date:  11//14/19    Weaning instructions:  N/A    Prescriptions prepped for review.     Cha Molina RN-BSN  [facility]

## 2019-11-07 NOTE — TELEPHONE ENCOUNTER
Signed Prescriptions:                        Disp   Refills    oxyCODONE (OXYCONTIN) 10 MG 12 hr tablet   60 tab*0        Sig: Take 1 tablet (10 mg) by mouth every 12 hours Fill on           11/12/19. Begin 11/14/2019. 30 day script for           chronic pain.  Authorizing Provider: SUSANA PETTY    Reviewed [place]  November 7, 2019- no concerning fills.    Susana GRANT, RN CNP, FNP  [facility]  [facility]

## 2019-11-07 NOTE — TELEPHONE ENCOUNTER
Patient requesting refill(s) of oxyCODONE (OXYCONTIN) 10 MG 12 hr tablet     Last picked up from pharmacy on 10/16/19    Pt last seen by prescribing provider on 10/15/19  Next appt scheduled for 12/17/19     checked in the past 6 months? Yes If no, print current report and give to RN    Last urine drug screen date 3/12/19  Current opioid agreement on file (completed within the last year) Yes Date of opioid agreement: 3/12/19    Processing (pick one and delete the others):      E-prescribe to [id number] [place]  [facility]    Will route to nursing pool for review and preparation of prescription(s).

## 2019-11-07 NOTE — TELEPHONE ENCOUNTER
Routed to the nursing pool and to the MA pool to process refill oxycontin.    Cha Molina, RN-BSN  [facility]

## 2019-11-26 ENCOUNTER — OFFICE VISIT (OUTPATIENT)
Dept: UROLOGY | Facility: CLINIC | Age: 54
End: 2019-11-26
Payer: COMMERCIAL

## 2019-11-26 DIAGNOSIS — F52.4 PREMATURE EJACULATION: Primary | ICD-10-CM

## 2019-11-26 PROCEDURE — 99204 OFFICE O/P NEW MOD 45 MIN: CPT | Performed by: UROLOGY

## 2019-11-26 RX ORDER — SILDENAFIL CITRATE 20 MG/1
TABLET ORAL
Qty: 30 TABLET | Refills: 3 | Status: SHIPPED | OUTPATIENT
Start: 2019-11-26 | End: 2020-07-07

## 2019-11-26 NOTE — PROGRESS NOTES
S: Patient is a pleasant 54-year-old  male who was seen the office today for a consultation with regard to patient's premature ejaculation.  Patient complains for the last 2 years patient has been sitting problems with premature ejaculation.  Initially it last minutes now only seconds.  Because of it, he has been shied away initiating sexual contacts with his wife.  He has tried some numbing medication in the past but only for a brief.  Of time.  He has no problem with getting erections.  His sex drive is strong.  He was on Zoloft for depression but has discontinued medication over a year ago.  Current Outpatient Medications   Medication Sig Dispense Refill     albuterol (PROAIR HFA/PROVENTIL HFA/VENTOLIN HFA) 108 (90 Base) MCG/ACT inhaler Inhale 2 puffs into the lungs every 6 hours as needed for shortness of breath / dyspnea 1 Inhaler 3     clonazePAM (KLONOPIN) 1 MG tablet Take 0.5-1 tablets (0.5-1 mg) by mouth daily as needed for anxiety 30 tablet 1     cyclobenzaprine (FLEXERIL) 10 MG tablet Take 0.5-1 tablets (5-10 mg) by mouth 3 times daily as needed for muscle spasms Caution sedation 60 tablet 2     etanercept (ENBREL) 50 MG/ML injection        folic acid (FOLVITE) 1 MG tablet Reported on 5/19/2017       IBUPROFEN PO Take 800 mg by mouth every 6 hours as needed for moderate pain Reported on 5/19/2017       nicotine ([employer] NICOTINE POLACRILEX) 2 MG lozenge Place 1 lozenge (2 mg) inside cheek every hour as needed for smoking cessation 360 lozenge 1     nicotine (NICORETTE) 2 MG gum Place 1 each (2 mg) inside cheek as needed for smoking cessation 240 each 11     oxyCODONE (OXYCONTIN) 10 MG 12 hr tablet Take 1 tablet (10 mg) by mouth every 12 hours Fill on 11/12/19. Begin 11/14/2019. 30 day script for chronic pain. 60 tablet 0     oxyCODONE (ROXICODONE) 5 MG tablet Take 1-2 tablets (5-10 mg) by mouth 3 times daily as needed for pain Doses 6 hours apart.Max of 3 tabs/day  Fill 9/20/19; start 9/20/19. 90  "tablet 0     predniSONE (DELTASONE) 20 MG tablet Take 1 tablet (20 mg) by mouth daily 5 tablet 0     sertraline (ZOLOFT) 100 MG tablet Take 1 tablet (100 mg) by mouth daily 90 tablet 1     sertraline (ZOLOFT) 50 MG tablet Take 1 tablet (50 mg) by mouth daily 90 tablet 1     sildenafil (REVATIO) 20 MG tablet Take 2-5 tabs one hour before sex 30 tablet 3     Allergies   Allergen Reactions     Hydrocodone Itching     Ms Contin [Morphine] Itching     Remeron Soltab Other (See Comments)     \"Blacked out\" for one week     Past Medical History:   Diagnosis Date     Ankylosing spondylitis (H) 3/1/2017     Anxiety      Arthritis     back, knees     Back pain     possible drug seeking behavior in the past.      Panic attacks      Past Surgical History:   Procedure Laterality Date     ARTHROSCOPY KNEE Right 9/22/2016    Procedure: ARTHROSCOPY KNEE;  Surgeon: Fredo Hughes MD;  Location: [facility] OR     INJECT PARAVERTEBRAL FACET JOINT LUMBAR / SACRAL FIRST Right 11/25/2016    Procedure: INJECT PARAVERTEBRAL FACET JOINT LUMBAR / SACRAL FIRST;  Surgeon: Doretha Magallanes MD;  Location: [facility] OR     ORTHOPEDIC SURGERY      Rt knee X 2     ORTHOPEDIC SURGERY      Lt foot      No family history on file.  Social History     Socioeconomic History     Marital status:      Spouse name: None     Number of children: None     Years of education: None     Highest education level: None   Occupational History     None   Social Needs     Financial resource strain: None     Food insecurity:     Worry: None     Inability: None     Transportation needs:     Medical: None     Non-medical: None   Tobacco Use     Smoking status: Never Smoker     Smokeless tobacco: Current User     Types: Chew     Tobacco comment: Chew   Substance and Sexual Activity     Alcohol use: No     Comment: none     Drug use: No     Sexual activity: Yes     Partners: Female     Comment: decreased with Prozac   Lifestyle     Physical activity:     Days per week: " None     Minutes per session: None     Stress: None   Relationships     Social connections:     Talks on phone: None     Gets together: None     Attends Rastafari service: None     Active member of club or organization: None     Attends meetings of clubs or organizations: None     Relationship status: None     Intimate partner violence:     Fear of current or ex partner: None     Emotionally abused: None     Physically abused: None     Forced sexual activity: None   Other Topics Concern     Parent/sibling w/ CABG, MI or angioplasty before 65F 55M? Not Asked   Social History Narrative     None       REVIEW OF SYSTEMS  =================  C: NEGATIVE for fever, chills, change in weight  I: NEGATIVE for worrisome rashes, moles or lesions  E/M: NEGATIVE for ear, mouth and throat problems  R: NEGATIVE for significant cough or SHORTNESS OF BREATH  CV:  NEGATIVE for chest pain, palpitations or peripheral edema  GI: NEGATIVE for nausea, abdominal pain, heartburn, or change in bowel habits  NEURO: NEGATIVE numbness/weakness  : see HPI  PSYCH: NEGATIVE depression/anxiety  LYmph: no new enlarged lymph nodes  Ortho: no new trauma/movements      Physical Exam:  There were no vitals taken for this visit.   Patient is pleasant, in no acute distress, good general condition.  Heart:  negative, PMI normal  Lung: no evidence of respiratory distress    Abdomen: Soft, nondistended, non tender. No masses. No rebound or guarding.   Exam: Penis no discharge.  Testes without any masses but no scrotal skin lesion.  Skin: Warm and dry.  No redness.  Neuro: grossly normal  Musculaskeletal: moving all extremities  Psych normal mood and affect  Musculoskeletal  moving all extremities  Hematologic/Lymphatic/Immunologic: normal ant/post cervical, axillary, supraclavicular and inguinal nodes    Assessment/Plan:   Pleasant 54-year-old male with premature ejaculation.  After long discussion with patient today with regard to his situation.  We  discussed doing options at length.  We discussed psychosexual counseling, numbing medication, antidepression, medication such as Viagra.  Pros and cons discussed at length.  I encouraged patient to start using numbing medication again.  I also encouraged him to initiate sexual activity his wife.  Patient will talk to his primary care doctor about starting antidepression again since it may help with his mood and also with premature ejaculation.  Patient was also prescribed some Viagra to use to see whether we can he can maintain his erection longer.

## 2019-12-05 ENCOUNTER — MYC MEDICAL ADVICE (OUTPATIENT)
Dept: PALLIATIVE MEDICINE | Facility: CLINIC | Age: 54
End: 2019-12-05

## 2019-12-05 DIAGNOSIS — M51.369 DDD (DEGENERATIVE DISC DISEASE), LUMBAR: ICD-10-CM

## 2019-12-05 DIAGNOSIS — M47.817 LUMBOSACRAL SPONDYLOSIS WITHOUT MYELOPATHY: ICD-10-CM

## 2019-12-05 DIAGNOSIS — M53.3 SACROILIAC JOINT PAIN: ICD-10-CM

## 2019-12-05 DIAGNOSIS — M25.562 CHRONIC PAIN OF BOTH KNEES: ICD-10-CM

## 2019-12-05 DIAGNOSIS — G89.29 CHRONIC PAIN OF BOTH KNEES: ICD-10-CM

## 2019-12-05 DIAGNOSIS — G89.4 CHRONIC PAIN SYNDROME: Primary | ICD-10-CM

## 2019-12-05 DIAGNOSIS — M25.561 CHRONIC PAIN OF BOTH KNEES: ICD-10-CM

## 2019-12-05 DIAGNOSIS — M79.18 MYOFASCIAL PAIN: ICD-10-CM

## 2019-12-05 DIAGNOSIS — M54.59 LUMBAR FACET JOINT PAIN: ICD-10-CM

## 2019-12-05 DIAGNOSIS — M45.7 ANKYLOSING SPONDYLITIS OF LUMBOSACRAL REGION (H): ICD-10-CM

## 2019-12-06 DIAGNOSIS — F17.200 SMOKING ADDICTION: ICD-10-CM

## 2019-12-06 RX ORDER — OXYCODONE HCL 10 MG/1
10 TABLET, FILM COATED, EXTENDED RELEASE ORAL EVERY 12 HOURS
Qty: 60 TABLET | Refills: 0 | Status: SHIPPED | OUTPATIENT
Start: 2019-12-06 | End: 2020-01-07

## 2019-12-06 NOTE — TELEPHONE ENCOUNTER
Patient requesting refill(s) of oxyCODONE (OXYCONTIN) 10 MG 12 hr tablet    Last dispensed from pharmacy on 11/12/19    Pt last seen by prescribing provider on 10/15/19  Next appt scheduled for 1/21/20     checked in the past 6 months? Yes If no, print current report and give to RN    Last urine drug screen date 3/12/19  Current opioid agreement on file (completed within the last year) Yes Date of opioid agreement: 3/12/19    Processing (pick one and delete the others):      E-prescribe to [id number] - [place]  pharmacy    Will route to nursing [place] for review and preparation of prescription(s).

## 2019-12-06 NOTE — TELEPHONE ENCOUNTER
Will ask RN to call patient  He should really have narcan given both the opioid dosing and the benzo.  Please explain what narcan is, who needs to know how to use it, and why he is getting a prescription.    Script Eprescribed to pharmacy    Will send this to RN team to notify patient.    Signed Prescriptions:                        Disp   Refills    oxyCODONE (OXYCONTIN) 10 MG 12 hr tablet   60 tab*0        Sig: Take 1 tablet (10 mg) by mouth every 12 hours Fill on           12/12/19. Begin 12/14/2019. 30 day script for           chronic pain.  Authorizing Provider: MARKIE NOE    naloxone (NARCAN) 4 MG/0.1ML nasal spray   0.2 mL 0        Sig: Spray 1 spray (4 mg) into one nostril alternating           nostrils as needed for opioid reversal every 2-3           minutes until assistance arrives  Authorizing Provider: MARKIE NOE MD  [facility]

## 2019-12-06 NOTE — TELEPHONE ENCOUNTER
Writer called pt and informed.    Josias Lopez, RN  Care Coordinator   [facility] [place]

## 2019-12-06 NOTE — TELEPHONE ENCOUNTER
From: Jamison Breen      Created: 12/5/2019 9:13 PM        *-*-*This message has not been handled.*-*-*    Hi, just giving a heads up early. Could you please refill my oxycontin and send it to [facility] [place],  thank you and happy holidays.      __________________________________  Will forward to MA and Nurse pool to process refills.    Josias Lopez, RN  Care Coordinator   [facility] [place]

## 2019-12-06 NOTE — TELEPHONE ENCOUNTER
"Requested Prescriptions   Pending Prescriptions Disp Refills     nicotine (NICORETTE) 2 MG gum  Last Written Prescription Date:  06/18/19  Last Fill Quantity: 240,  # refills: 11   Last Office Visit with [facility], [facility] or [facility] prescribing provider:  03/27/19Shea   Future Office Visit:    Next 5 appointments (look out 90 days)    Jan 21, 2020  1:30 PM CST  Return Visit with JUANITA Ga CNP  [facility] ([facility]) [address] [id number] [phone]        240 each 11     Sig: Place 1 each (2 mg) inside cheek as needed for smoking cessation       Partial Cholinergic Nicotinic Agonist Agents Failed - 12/6/2019 10:10 AM        Failed - Blood pressure under 140/90 in past 12 months     BP Readings from Last 3 Encounters:   10/15/19 (!) 141/94   08/30/19 (!) 138/92   06/28/19 (!) 138/91                 Passed - Recent (12 mo) or future (30 days) visit within the authorizing provider's specialty     Patient has had an office visit with the authorizing provider or a provider within the authorizing providers department within the previous 12 mos or has a future within next 30 days. See \"Patient Info\" tab in inbasket, or \"Choose Columns\" in Meds & Orders section of the refill encounter.              Passed - Medication is active on med list        Passed - Patient is 18 years of age or older          "

## 2019-12-06 NOTE — TELEPHONE ENCOUNTER
Medication refill information reviewed.     Due date for oxyCODONE (OXYCONTIN) 10 MG 12 hr tablet is 12/14/19     Prescriptions prepped for review.     Will route to provider.     Josias Lopez RN  Care Coordinator   [facility] [place]

## 2019-12-09 ENCOUNTER — MYC REFILL (OUTPATIENT)
Dept: FAMILY MEDICINE | Facility: CLINIC | Age: 54
End: 2019-12-09

## 2019-12-09 ENCOUNTER — MYC MEDICAL ADVICE (OUTPATIENT)
Dept: PALLIATIVE MEDICINE | Facility: CLINIC | Age: 54
End: 2019-12-09

## 2019-12-09 ENCOUNTER — MYC MEDICAL ADVICE (OUTPATIENT)
Dept: FAMILY MEDICINE | Facility: CLINIC | Age: 54
End: 2019-12-09

## 2019-12-09 DIAGNOSIS — F17.200 SMOKING ADDICTION: ICD-10-CM

## 2019-12-09 DIAGNOSIS — F41.1 GAD (GENERALIZED ANXIETY DISORDER): ICD-10-CM

## 2019-12-09 NOTE — TELEPHONE ENCOUNTER
"Requested Prescriptions   Pending Prescriptions Disp Refills     sertraline (ZOLOFT) 100 MG tablet  Last Written Prescription Date:  03/27/19  Last Fill Quantity: 90,  # refills: 1   Last Office Visit with [facility] prescribing provider:  03/27/19-Mal   Future Office Visit:    Next 5 appointments (look out 90 days)    Jan 21, 2020  1:30 PM CST  Return Visit with JUANITA Ga CNP  [facility] ([facility]) [address] [address]  [phone]        90 tablet 1     Sig: Take 1 tablet (100 mg) by mouth daily       SSRIs Protocol Passed - 12/9/2019  9:55 AM        Passed - Recent (12 mo) or future (30 days) visit within the authorizing provider's specialty     Patient has had an office visit with the authorizing provider or a provider within the authorizing providers department within the previous 12 mos or has a future within next 30 days. See \"Patient Info\" tab in inbasket, or \"Choose Columns\" in Meds & Orders section of the refill encounter.              Passed - Medication is active on med list        Passed - Patient is age 18 or older        clonazePAM (KLONOPIN) 1 MG tablet        Last Written Prescription Date:  06/12/19  Last Fill Quantity: 30,   # refills: 1  Last Office Visit: 03/07/19-Mal  Future Office visit:    Next 5 appointments (look out 90 days)    Jan 21, 2020  1:30 PM CST  Return Visit with JUANITA Ga CNP  [facility] ([facility]) [address] [address]  [phone]           Routing refill request to provider for review/approval because:  Drug not on the [facility], [facility] or [facility] refill protocol or controlled substance 30 tablet 1     Sig: Take 0.5-1 tablets (0.5-1 mg) by mouth daily as needed for anxiety       There is no refill protocol information for this order          "

## 2019-12-10 NOTE — TELEPHONE ENCOUNTER
Routing refill request to provider for review/approval because:  BP is out of range.      Chantel Staley RN, BSN, PHN

## 2019-12-10 NOTE — TELEPHONE ENCOUNTER
Called pharmacy and gave OK to fill today.    Josias Lopez, RN  Care Coordinator   [facility] [place]

## 2019-12-11 RX ORDER — SERTRALINE HYDROCHLORIDE 100 MG/1
100 TABLET, FILM COATED ORAL DAILY
Qty: 90 TABLET | Refills: 1 | Status: SHIPPED | OUTPATIENT
Start: 2019-12-11 | End: 2020-09-21

## 2019-12-11 RX ORDER — CLONAZEPAM 1 MG/1
0.5-1 TABLET ORAL DAILY PRN
Qty: 30 TABLET | Refills: 1 | Status: SHIPPED | OUTPATIENT
Start: 2019-12-11 | End: 2020-07-01

## 2019-12-11 NOTE — TELEPHONE ENCOUNTER
Routing refill request to provider for review/approval because:  Drug not on the [facility] refill protocol - Clonazepam.  Two active doses on patient's current medication list- Sertraline. Please clarify dose.  Suzy Muhammad RN  [facility]

## 2020-01-05 ENCOUNTER — MYC MEDICAL ADVICE (OUTPATIENT)
Dept: PALLIATIVE MEDICINE | Facility: CLINIC | Age: 55
End: 2020-01-05

## 2020-01-05 DIAGNOSIS — M79.18 MYOFASCIAL PAIN: ICD-10-CM

## 2020-01-05 DIAGNOSIS — M53.3 SACROILIAC JOINT PAIN: ICD-10-CM

## 2020-01-05 DIAGNOSIS — M51.369 DDD (DEGENERATIVE DISC DISEASE), LUMBAR: ICD-10-CM

## 2020-01-05 DIAGNOSIS — M45.7 ANKYLOSING SPONDYLITIS OF LUMBOSACRAL REGION (H): ICD-10-CM

## 2020-01-05 DIAGNOSIS — M25.561 CHRONIC PAIN OF BOTH KNEES: ICD-10-CM

## 2020-01-05 DIAGNOSIS — G89.4 CHRONIC PAIN SYNDROME: ICD-10-CM

## 2020-01-05 DIAGNOSIS — G89.29 CHRONIC PAIN OF BOTH KNEES: ICD-10-CM

## 2020-01-05 DIAGNOSIS — M54.59 LUMBAR FACET JOINT PAIN: ICD-10-CM

## 2020-01-05 DIAGNOSIS — M47.817 LUMBOSACRAL SPONDYLOSIS WITHOUT MYELOPATHY: ICD-10-CM

## 2020-01-05 DIAGNOSIS — M25.562 CHRONIC PAIN OF BOTH KNEES: ICD-10-CM

## 2020-01-06 NOTE — TELEPHONE ENCOUNTER
Routed to the nursing pool and to the MA pool to process refill(s).    Cha Molina, RN-BSN  [facility]

## 2020-01-06 NOTE — TELEPHONE ENCOUNTER
Received call from patient requesting refill(s) of oxyCODONE (OXYCONTIN) 10 MG 12 hr tablet    Last picked up from pharmacy on 12/11/19    Pt last seen by prescribing provider on 10/15/19    Next appt scheduled for 01/21/20     checked in the past 6 months? Yes If no, print current report and give to RN    Last urine drug screen date 03/12/19  Current opioid agreement on file (completed within the last year) Yes Date of opioid agreement: 03/12/19    Processing (pick one and delete the others):      E-prescribe to     [id number] - [place] - [address]  [address] [address]  Phone: [phone] Fax: [phone]    Will route to nursing pool for review and preparation of prescription(s).    Sherron Jennings [facility]  [place]

## 2020-01-07 RX ORDER — OXYCODONE HCL 10 MG/1
10 TABLET, FILM COATED, EXTENDED RELEASE ORAL EVERY 12 HOURS
Qty: 60 TABLET | Refills: 0 | Status: SHIPPED | OUTPATIENT
Start: 2020-01-07 | End: 2020-01-21

## 2020-01-07 NOTE — TELEPHONE ENCOUNTER
Signed Prescriptions:                        Disp   Refills    oxyCODONE (OXYCONTIN) 10 MG 12 hr tablet   60 tab*0        Sig: Take 1 tablet (10 mg) by mouth every 12 hours Fill on           1/8/20. Begin 1/10/20. 30 day script for chronic           pain.  Authorizing Provider: SUSANA PETTY    Reviewed [place]  January 7, 2020- no concerning fills.last script was for #60 and only filled with #54 from pharmacy, therefore is out 3 days early as did not get full amount with last fill.    Susana Petty APRN, RN CNP, FNP  [facility]  [facility]

## 2020-01-07 NOTE — TELEPHONE ENCOUNTER
Medication refill information reviewed.     Last due:  Fill on 12/12/19. Begin 12/14/2019.     Due date:  1/13/20    Weaning instructions:  N/A    Prescriptions prepped for review.     Cha Molina RN-BSN  [facility]

## 2020-01-21 ENCOUNTER — OFFICE VISIT (OUTPATIENT)
Dept: PALLIATIVE MEDICINE | Facility: CLINIC | Age: 55
End: 2020-01-21
Payer: COMMERCIAL

## 2020-01-21 VITALS
BODY MASS INDEX: 33.6 KG/M2 | WEIGHT: 276 LBS | DIASTOLIC BLOOD PRESSURE: 86 MMHG | SYSTOLIC BLOOD PRESSURE: 134 MMHG | HEART RATE: 57 BPM

## 2020-01-21 DIAGNOSIS — Z79.891 ENCOUNTER FOR LONG-TERM OPIATE ANALGESIC USE: ICD-10-CM

## 2020-01-21 DIAGNOSIS — M47.816 SPONDYLOSIS OF LUMBAR REGION WITHOUT MYELOPATHY OR RADICULOPATHY: ICD-10-CM

## 2020-01-21 DIAGNOSIS — M53.3 SACROILIAC JOINT PAIN: ICD-10-CM

## 2020-01-21 DIAGNOSIS — G89.4 CHRONIC PAIN SYNDROME: ICD-10-CM

## 2020-01-21 DIAGNOSIS — M25.562 CHRONIC PAIN OF BOTH KNEES: ICD-10-CM

## 2020-01-21 DIAGNOSIS — M25.561 CHRONIC PAIN OF BOTH KNEES: ICD-10-CM

## 2020-01-21 DIAGNOSIS — G89.29 CHRONIC PAIN OF BOTH KNEES: ICD-10-CM

## 2020-01-21 DIAGNOSIS — M51.369 DDD (DEGENERATIVE DISC DISEASE), LUMBAR: ICD-10-CM

## 2020-01-21 DIAGNOSIS — M54.59 LUMBAR FACET JOINT PAIN: Primary | ICD-10-CM

## 2020-01-21 DIAGNOSIS — M79.18 MYOFASCIAL PAIN: ICD-10-CM

## 2020-01-21 DIAGNOSIS — M45.7 ANKYLOSING SPONDYLITIS OF LUMBOSACRAL REGION (H): ICD-10-CM

## 2020-01-21 PROCEDURE — 99214 OFFICE O/P EST MOD 30 MIN: CPT | Performed by: NURSE PRACTITIONER

## 2020-01-21 PROCEDURE — 99000 SPECIMEN HANDLING OFFICE-LAB: CPT | Performed by: NURSE PRACTITIONER

## 2020-01-21 PROCEDURE — 80307 DRUG TEST PRSMV CHEM ANLYZR: CPT | Mod: 90 | Performed by: NURSE PRACTITIONER

## 2020-01-21 RX ORDER — ATENOLOL 25 MG/1
25 TABLET ORAL DAILY
COMMUNITY
End: 2021-02-17

## 2020-01-21 RX ORDER — OXYCODONE HCL 10 MG/1
10 TABLET, FILM COATED, EXTENDED RELEASE ORAL EVERY 12 HOURS
Qty: 60 TABLET | Refills: 0 | Status: SHIPPED | OUTPATIENT
Start: 2020-01-21 | End: 2020-03-09

## 2020-01-21 ASSESSMENT — PAIN SCALES - GENERAL: PAINLEVEL: WORST PAIN (10)

## 2020-01-21 NOTE — LETTER
[facility]  01/21/20    Patient: Jailene Breen  YOB: 1965  Medical Record Number: [id number]                                                                  Opioid / Opioid Plus Controlled Substance Agreement    I understand that my care provider has prescribed an opioid (narcotic) controlled substance to help manage my condition(s). I am taking this medicine to help me function or work. I know this is strong medicine, and that it can cause serious side effects. Opioid medicine can be sedating, addicting and may cause a dependency on the drug. They can affect my ability to drive or think, and cause depression. They need to be taken exactly as prescribed. Combining opioids with certain medicines or chemicals (such as cocaine, sedatives and tranquilizers, sleeping pills, meth) can be dangerous or even fatal. Also, if I stop opioids suddenly, I may have severe withdrawal symptoms. Last, I understand that opioids do not work for all types of pain nor for all patients. If not helpful, I may be asked to stop them.    The risks, benefits, and side effects of these medicine(s) were explained to me. I agree that:    1. I will take part in other treatments as advised by my care team. This may be psychiatry or counseling, physical therapy, behavioral therapy, group treatment or a referral to a pain clinic. I will reduce or stop my medicine when my care team tells me to do so.  2. I will take my medicines as prescribed. I will not change the dose or schedule unless my care team tells me to. There will be no refills if I  run out early.   I may be contactedwithout warning and asked to complete a urine drug test or pill count at any time.   3. I will keep all my appointments, and understand this is part of the monitoring of opioids. My care team may require an office visit for EVERY opioid/controlled substance refill. If I miss appointments or don t follow instructions, my care team  may stop my medicine.  4. I will not ask other providers to prescribe controlled substances, and I will not accept controlled substances from other people. If I need another prescribed controlled substance for a new reason, I will tell my care team within 1 business day.  5. I will use one pharmacy to fill all of my controlled substance prescriptions, and it is up to me to make sure that I do not run out of my medicines on weekends or holidays. If my care team is willing to refill my opioid prescription without a visit, I must request refills only during office hours, refills may take up to 3 days to process, and it may take up to 5 to 7 days for my medicine to be mailed and ready at my pharmacy. Prescriptions will not be mailed anywhere except my pharmacy.        [id number]  Rev 12/18         Registration to scan to EHR                             Page 1 of 2               Controlled Substance Agreement Opioid        [facility]  01/21/20  Patient: Jailene Breen  YOB: 1965  Medical Record Number: [id number]                                                                  6. I am responsible for my prescriptions. If the medicine/prescription is lost or stolen, it will not be replaced. I also agree not to share controlled substance medicines with anyone.  7. I agree to not use ANY illegal or recreational drugs. This includes marijuana, cocaine, bath salts or other drugs. I agree not to use alcohol unless my care team says I may.          I agree to give urine samples whenever asked. If I don t give a urine sample, the care team may stop my medicine.    8. If I enroll in the [place] Medical Marijuana program, I will tell my care team. I will also sign an agreement to share my medical records with my care team.   9. I will bring in my list of medicines (or my medicine bottles) each time I come to the clinic.   10. I will tell my care team right away if I become pregnant or have a  new medical problem treated outside of my regular clinic.  11. I understand that this medicine can affect my thinking and judgment. It may be unsafe for me to drive, use machinery and do dangerous tasks. I will not do any of these things until I know how the medicine affects me. If my dose changes, I will wait to see how it affects me. I will contact my care team if I have concerns about medicine side effects.    I understand that if I do not follow any of the conditions above, my prescriptions or treatment may be stopped.      I agree that my provider, clinic care team, and pharmacy may work with any city, state or federal law enforcement agency that investigates the misuse, sale, or other diversion of my controlled medicine. I will allow my provider to discuss my care with or share a copy of this agreement with any other treating provider, pharmacy or emergency room where I receive care. I agree to give up (waive) any right of privacy or confidentiality with respect to these consents.     I have read this agreement and have asked questions about anything I did not understand.      ________________________________________________________________________  Patient signature - Date/Time -  Jailene Breen                                      ________________________________________________________________________  Witness signature                                                            ________________________________________________________________________  Provider signature - JUANITA Ga CNP      [id number]  Rev 12/18         Registration to scan to EHR                         Page 2 of 2                   Controlled Substance Agreement Opioid           Page 1 of 2  Opioid Pain Medicines (also known as Narcotics)  What You Need to Know    What are opioids?   Opioids are pain medicines that must be prescribed by a doctor.  They are also known as narcotics.    Examples are:     morphine (MS Contin,  Raven)    oxycodone (Oxycontin)    oxycodone and acetaminophen (Percocet)    hydrocodone and acetaminophen (Vicodin, Norco)     fentanyl patch (Duragesic)     hydromorphone (Dilaudid)     methadone     What do opioids do well?   Opioids are best for short-term pain after a surgery or injury. They also work well for cancer pain. Unlike other pain medicines, they do not cause liver or kidney failure or ulcers. They may help some people with long-lasting (chronic) pain.     What do opioids NOT do well?   Opioids never get rid of pain entirely, and they do not work well for most patients with chronic pain. Opioids do not reduce swelling, one of the causes of pain. They also don t work well for nerve pain.                           For informational purposes only.  Not to replace the advice of your care provider.  Copyright [facility]. All right reserved. [employer] [id number] 02/18.      Page 2 of 2    Risks and side effects   Talk to your doctor before you start or decide to keep taking one of these medicines. Side effects include:    Lowering your breathing rate enough to cause death    Overdose, including death, especially if taking higher than prescribed doses    Long-term opioid use    Worse depression symptoms; less pleasure in things you usually enjoy    Feeling tired or sluggish    Slower thoughts or cloudy thinking    Being more sensitive to pain over time; pain is harder to control    Trouble sleeping or restless sleep    Changes in hormone levels (for example, less testosterone)    Changes in sex drive or ability to have sex    Constipation    Unsafe driving    Itching and sweating    Feeling dizzy    Nausea, vomiting and dry mouth    What else should I know about opioids?  When someone takes opioids for too long or too often, they become dependent. This means that if you stop or reduce the medicine too quickly, you will have withdrawal symptoms.    Dependence is not the same as addiction.  Addiction is when people keep using a substance that harms their body, their mind or their relations with others. If you have a history of drug or alcohol abuse, taking opioids can cause a relapse.    Over time, opioids don t work as well. Most people will need higher and higher doses. The higher the dose, the more serious the side effects. We don t know the long-term effects of opioids.      Prescribed opioids aren't the best way to manage chronic pain    Other ways to manage pain include:      Ibuprofen or acetaminophen.  You should always try this first.      Treat health problems that may be causing pain.      acupuncture or massage, deep breathing, meditation, visual imagery, aromatherapy.      Use heat or ice at the pain site      Physical therapy and exercise      Stop smoking      See a counselor or therapist                                                  People who have used opioids for a long time may have a lower quality of life, worse depression, higher levels of pain and more visits to doctors.    Never share your opioids with others. Be sure to store opioids in a secure place, locked if possible.Young children can easily swallow them and overdose.     You can overdose on opioids.  Signs of overdose include decrease or loss of consciousness, slowed breathing, trouble waking and blue lips.  If someone is worried about overdose, they should call 911.    If you are at risk for overdose, you may get naloxone (Narcan, a medicine that reverses the effects of opioids.  If you overdose, a friend or family member can give you Narcan while waiting for the ambulance.  They need to know the signs of overdose and how to give Narcan.    While you're taking opioids:    Don't use alcohol or street drugs. Taking them together can cause death.    Don't take any of these medicines unless your doctor says its okay.  Taking these with opioids can cause death.    Benzodiazepines (such as lorazepam         or  diazepam)    Muscle relaxers (such as cyclobenzaprine)    sleeping pills    other opioids    Safe disposal of opioids  Find your area drug take-back program, your pharmacy mail-back program, buy a special disposal bag (such as Deterra) from your pharmacy or flush them down the toilet.  Use the guidelines at:  www.fda.gov/drugs/resourcesforyou

## 2020-01-21 NOTE — TELEPHONE ENCOUNTER
E-prescription confirmation received. Patient was informed via [employer].   Yes - the patient is able to be screened

## 2020-01-21 NOTE — PROGRESS NOTES
[facility]    January 21, 2020        Chief complaint: lower  back pain. Bilateral knee pain, hand pain, shoulder pain.        Interval history:   Jailene Breen is a 54 year old male is known to me for   Lumbar spondylosis, pain is worse with extension and rotation indicating a facetogenic component to pain  Lumbar degenerative disc disease  SI joint pain  Ankylosing spondylitis  Myofascial pain  Chronic pain syndrome  PMHx includes: Panic attacks, back pain, arthritis, anxiety, ankylosing spondylitis  PSHx includes: Right knee surgery ×2, left foot surgery right knee arthroscopy        Recommendations/plan at the last visit on 10/15/2019 included:  1. Recommended to increase activity while pacing himself  2. Physical Therapy:  He is not currently involved with PHYSICAL THERAPY   3. Clinical Health Psychologist: none  4. Diagnostic Studies:  none  5. Medication Management:    1. Stop oxycodone IR  2. Start OxyContin 10mg BID. OK to fill and start today.  3. Continue Flexeril 5-10mg TID PRN  4. Patient to consider medical cannabis certification and the use of Buprenorphine..  6. Further procedures recommended: None at present  7. Les to follow up with Primary Care provider regarding elevated blood pressure.  8. Recommendations to PCP: see above  9. Follow up: 4 weeks        Since his last visit, Jailene Breen reports:  -Recently, he fell on the ice while walking to get the mail, he landed on buttocks. The fall aggravated lumbar facet joint pain.  -Low back pain and right sided groin pain. This pain is aggravated by prolonged sitting in class, he doesn't like the chairs there.  -Rheumatoid arthritis over the upper extremities is worse.  -Pain flare today. The patient has been very busy and he forgot to take his OxyContin medication this morning.   -OxyContin is helpful and he is tolerating well.  -The patient agrees with repeat lumbar RFA as he states his pain is exactly how it felt prior to  "previous lumbar RFA.         At this point, the patient's participation with our multidisciplinary team includes:  The patient has been compliant with the program.    PT - has seen Cyndee White, none recently  Health Psych - worked with  Padilla Keene, last on 11/6/2018.  Padilla Keene is no longer with our clinic.       Pain scores:    Pain intensity on average is 8 on a scale of 0-10.    Range is 7-10/10.   Pain right now is 9/10.  Pain is described as \"shooting, throbbing, penetrating, and stabbing.\"  Pain is constant in nature    Current pain relevant medications:      -nortriptyline 50mg at bedtime (helpful)  -humira 40mg twice monthly(helpful--off of this at present time)  -Enbrel 50mg/mL   -ibuprofen 800mg TID PRN (using 3 times daily-helpful)  -Tylenol 500mg using 1000mg TID (unsure if helpful)  -Maxalt 10mg PRN migraine (helpful for migraine--he does have visual aura)  -OxyContin 10mg BID (helpful)  -Flexeril 10mg TID PRN (helpful)  -naloxone nasal spray PRN for accidental opiate overdose      Other pertinent medications:   -clonazepam 1mg tab, may take 0.5-1mg BID PRN anxiety (helpful, has been using infrequently)  -methotrexate 2.5mg taking 6 tabs once per week    Previous Medications:   OPIATES: Oxycodone (helpful), Fentanyl (100mcg/hr patch helpful), hydrocodone (itching), Tramadol (not helpful)   NSAIDS: ibuprofen (not helpful), Aleve (not helpful)  MUSCLE RELAXANTS: methocarbamol (somewhat helpful), Flexeril (somewhat helpful), tizanidine (unsure if helpful), metaxalone (unsure), SOMA (helpful)  ANTI-MIGRAINE MEDS: Maxalt (helpful for migraine), Imitrex injection (somewhat helpful)  ANTI-DEPRESSANTS: Prozac (helpful), Cymbalta (felt weird on it), nortriptyline (unsure if helpful), Buspar (unsure), Remeron (blacked out), bupropion (not helpful for smoking cessation)  SLEEP AIDS: Ambien (helpful)  ANTI-CONVULSANTS: gabapentin (felt odd on this medication, unsure of dose), Lyrica (not helpful " for 4 months, unsure of dose), Topamax (not helpful, he felt weird on it)   TOPICALS: Lidocaine patches (not helpful), Voltaren gel (not helpful)  Other meds: Tylenol (unsure if helpful)        Other treatments have included:   Jailene Breen has been seen at a pain clinic in the past.  [facility]  PT: tried, not helpful  Chiropractic: tried, not helpful  Acupuncture: none  TENs Unit: tried, helpful     Injections:     -has had injections at outside clinics  -has had epidural injections for low back pain (one was helpful)  -he has had lumbar medial branch blocks at [facility] and he was going to have lumbar radiofrequency ablation (did not do this due to cost)  -4/3/2017 right LMBBs with Dr. Ashlyn Gamble (helpful)  -4/26/2017 right LMBBs with Dr. Ashlyn Gamble (helpful)  -5/31/2017 right L3, L4, L5 RFA with Dr. Ashlyn Gamble (good relief for over 6 months)  -3/15/2018 right L5 transforaminal MAKAYLA with Dr. Ahslyn Gamble (not helpful)  -5/10/2018 trigger point injections with Dr. Ashlyn Gamble(somewhat helpful).  -7/12/2018 Right L3, L4, L5 RFA with Dr. Ashlyn Gamble (helpful).  -9/20/2018 Left piriformis injections, bilateral gluteal trigger point with Dr. Gamble (helpful).  -1/31/2019 right L2-3, L3-4 and L4-5 facet joint injections with Dr. Ashlyn Gamble (somewhat helpful)  4/4/2019  L3, L4, L5/sacral ala lumbar radiofrequency ablation with Dr. Gamble (helpful over right side)  6/28/2019 Bilateral facet joint injections at l4-5, L5-S1 with Dr. Holley (only helpful for 7 days)            Side Effects: no side effect  Patient is using the medication as prescribed: YES    Medications:  Current Outpatient Medications   Medication Sig Dispense Refill     albuterol (PROAIR HFA/PROVENTIL HFA/VENTOLIN HFA) 108 (90 Base) MCG/ACT inhaler Inhale 2 puffs into the lungs every 6 hours as needed for shortness of breath / dyspnea 1 Inhaler 3     atenolol (TENORMIN) 25 MG tablet Take 25 mg by mouth daily        clonazePAM (KLONOPIN) 1 MG tablet Take 0.5-1 tablets (0.5-1 mg) by mouth daily as needed for anxiety 30 tablet 1     cyclobenzaprine (FLEXERIL) 10 MG tablet Take 0.5-1 tablets (5-10 mg) by mouth 3 times daily as needed for muscle spasms Caution sedation 60 tablet 2     etanercept (ENBREL) 50 MG/ML injection        folic acid (FOLVITE) 1 MG tablet Reported on 5/19/2017       IBUPROFEN PO Take 800 mg by mouth every 6 hours as needed for moderate pain Reported on 5/19/2017       naloxone (NARCAN) 4 MG/0.1ML nasal spray Spray 1 spray (4 mg) into one nostril alternating nostrils as needed for opioid reversal every 2-3 minutes until assistance arrives 0.2 mL 0     nicotine (NICORETTE) 2 MG gum Place 1 each (2 mg) inside cheek as needed for smoking cessation 240 each 11     oxyCODONE (OXYCONTIN) 10 MG 12 hr tablet Take 1 tablet (10 mg) by mouth every 12 hours Fill on 2/6/2020. Begin 2/9/20. 60 tablet 0     predniSONE (DELTASONE) 20 MG tablet Take 1 tablet (20 mg) by mouth daily 5 tablet 0     sertraline (ZOLOFT) 100 MG tablet Take 1 tablet (100 mg) by mouth daily 90 tablet 1     sertraline (ZOLOFT) 50 MG tablet Take 1 tablet (50 mg) by mouth daily 90 tablet 1     sildenafil (REVATIO) 20 MG tablet Take 2-5 tabs one hour before sex 30 tablet 3       Medical History: any changes in medical history since they were last seen? No    Social History:    Home situation: , has 2 children in college  Occupation/Schooling: currently unemployed, worked as a  (unemployed since 3/1/2017). 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  Uses chew  If yes, was cessation counseling offered?  Not today    Review of Systems:    ROS is positive as per HPI as well as for ringing in ears, cough, SOB, HTN, joint pain, arthritis, back pain, neck pain, depression, anxiety,  stress, and is negative for fevers, chills, sweats, or constipation, diarrhea.    This document serves as a record of the services and decisions personally performed and made by Susana GRANT. It was created on her behalf by Asif Carreno, a trained medical scribe. The creation of this document is based on the provider's statements to the medical scribe.  Asif Carreno 1:43 PM January 21, 2020        Physical Exam:     Vital signs: /86   Pulse 57   Wt 125.2 kg (276 lb)   BMI 33.60 kg/m       Behavioral observations:  Awake, alert, cooperative.     Gait:  normal    Musculoskeletal exam:    Lumbar flexion to 40 degrees  Lumbar extension to 20 degrees painful  Lumbar extension/rotation to the right is painful  Lumbar extension/rotation to the left is pain-free  SI joints are non tender  Piriformis are non tender  Greater trochanters are non tender  Strength grossly equal throughout        Neuro exam:  SILT in all extremities     Skin/vascular/autonomic:  No suspicious lesions on exposed skin.      Other:  NA    Is the patient hypertensive today? Yes  Hypertensive on recheck of BP?   no  If yes, was patient recommended to see Primary Care Provider in follow up for management of HTN?  no        [place] Prescription Monitoring Program:  Reviewed [facility] January 21, 2020- no concerning fills.  Susana GRANT, RN CNP, FNP  [facility]        DIRE Score for selecting candidates for long term opioid analgesia for chronic pain:  Diagnosis  2  Intractablility  2  Risk    Psychological health  2    Chemical health  2    Reliability  2    Social support  2  Efficacy  2    Total DIRE Score = 14. Note that  7-13 predicts poor outcome (compliance and efficacy) from opioid prescribing; 14-21 predicts good outcome (compliance and efficacy)  from opioid prescribing.      Assessment:     1. Lumbar facet joint pain  2. Spondylosis of lumbar region without myelopathy or radiculopathy    3. DDD,  lumbar  4. Ankylosing spondylitis of lumbosacral region  5. Myofascial pain  6. Chronic pain syndrome  7. SI joint pain  8. Chronic pain of both knees   9. Encounter for long-term opiate analgesic use  10. PMHx includes: Panic attacks, back pain, arthritis, anxiety, ankylosing spondylitis  11. PSHx includes: Right knee surgery ×2, left foot surgery right knee arthroscopy      Plan:    1. Recommended to increase activity while pacing himself  2. Physical Therapy:  He is not currently involved with PHYSICAL THERAPY   3. Clinical Health Psychologist: none  4. Diagnostic Studies:  none  5. Medication Management:    1. Continue OxyContin 10mg BID.   2. Continue Flexeril 5-10mg TID PRN  6. Further procedures recommended: Patient to schedule repeat lumbar RFA, office to contact patient.  7. Recommendations to PCP: see above  8. Follow up: 8-12 weeks  9. UDS, last took OxyContin last night, overslept and did not take AM dose.  10. Re-signed opiate agreement form today.    Total time spent face to face was 25 minutes and more than 50% of face to face time was spent in counseling and/or coordination of care regarding the diagnosis and recommendations above.    The information in this document, created by the medical scribe for me, accurately reflects the services I personally performed and the decisions made by me. I have reviewed and approved this document for accuracy prior to leaving the patient care area.  January 21, 2020       Susana GRANT RN CNP, FNP  [facility]  [facility]

## 2020-01-21 NOTE — PATIENT INSTRUCTIONS
PLAN  1. Medications:   1. Continue OxyContin 10mg twice daily. OK to refill on 2/6 and start on 2/9.  2. Continue Flexeril 5-10mg three times daily as needed.  2. Procedures: Schedule a repeat lumbar RFA, our office will contact you with further instructions.  3. Follow-up with me in 8-12 weeks  4. Les to follow up with Primary Care provider regarding elevated blood pressure. 140/90  5. Urine drug screen, last took OxyContin last night, overslept and did not take this morning's dose  6. Re-signed opiate agreement form today.        ----------------------------------------------------------------  Clinic Number:  [phone]     Call with any questions about your care and for scheduling assistance.     Calls are returned Monday through Friday between 8 AM and 4:30 PM. We usually get back to you within 2 business days depending on the issue/request.    If we are prescribing your medications:    For opioid medication refills, call the clinic or send a [employer] message 7 days in advance.  Please include:    Name of requested medication    Name of the pharmacy.    For non-opioid medications, call your pharmacy directly to request a refill. Please allow 3-4 days to be processed.     Per [place] State Law:    All controlled substance prescriptions must be filled within 30 days of being written.      For those controlled substances allowing refills, pickup must occur within 30 days of last fill.      We believe regular attendance is key to your success in our program!      Any time you are unable to keep your appointment we ask that you call us at least 24 hours in advance to cancel.This will allow us to offer the appointment time to another patient.     Multiple missed appointments may lead to dismissal from the clinic.

## 2020-01-22 ENCOUNTER — MYC MEDICAL ADVICE (OUTPATIENT)
Dept: PALLIATIVE MEDICINE | Facility: CLINIC | Age: 55
End: 2020-01-22

## 2020-01-23 LAB — PAIN DRUG SCR UR W RPTD MEDS: NORMAL

## 2020-02-12 ENCOUNTER — RADIOLOGY INJECTION OFFICE VISIT (OUTPATIENT)
Dept: PALLIATIVE MEDICINE | Facility: CLINIC | Age: 55
End: 2020-02-12
Payer: COMMERCIAL

## 2020-02-12 ENCOUNTER — ANCILLARY PROCEDURE (OUTPATIENT)
Dept: RADIOLOGY | Facility: CLINIC | Age: 55
End: 2020-02-12
Attending: PSYCHIATRY & NEUROLOGY
Payer: COMMERCIAL

## 2020-02-12 VITALS
SYSTOLIC BLOOD PRESSURE: 141 MMHG | OXYGEN SATURATION: 95 % | HEART RATE: 61 BPM | DIASTOLIC BLOOD PRESSURE: 91 MMHG | RESPIRATION RATE: 15 BRPM

## 2020-02-12 DIAGNOSIS — G89.29 OTHER CHRONIC PAIN: ICD-10-CM

## 2020-02-12 DIAGNOSIS — M47.819 FACET ARTHROPATHY: Primary | ICD-10-CM

## 2020-02-12 DIAGNOSIS — M47.816 SPONDYLOSIS OF LUMBAR REGION WITHOUT MYELOPATHY OR RADICULOPATHY: ICD-10-CM

## 2020-02-12 DIAGNOSIS — G89.18 PAIN ASSOCIATED WITH SURGICAL PROCEDURE: ICD-10-CM

## 2020-02-12 PROCEDURE — 64635 DESTROY LUMB/SAC FACET JNT: CPT | Mod: RT | Performed by: PSYCHIATRY & NEUROLOGY

## 2020-02-12 PROCEDURE — 99153 MOD SED SAME PHYS/QHP EA: CPT | Performed by: PSYCHIATRY & NEUROLOGY

## 2020-02-12 PROCEDURE — 99152 MOD SED SAME PHYS/QHP 5/>YRS: CPT | Performed by: PSYCHIATRY & NEUROLOGY

## 2020-02-12 PROCEDURE — 64636 DESTROY L/S FACET JNT ADDL: CPT | Mod: RT | Performed by: PSYCHIATRY & NEUROLOGY

## 2020-02-12 RX ORDER — KETOROLAC TROMETHAMINE 30 MG/ML
15 INJECTION, SOLUTION INTRAMUSCULAR; INTRAVENOUS ONCE
Status: COMPLETED | OUTPATIENT
Start: 2020-02-12 | End: 2020-02-12

## 2020-02-12 RX ORDER — FENTANYL CITRATE 50 UG/ML
12.5-5 INJECTION, SOLUTION INTRAMUSCULAR; INTRAVENOUS EVERY 5 MIN PRN
Status: DISCONTINUED | OUTPATIENT
Start: 2020-02-12 | End: 2021-03-16

## 2020-02-12 RX ORDER — OXYCODONE HYDROCHLORIDE 5 MG/1
5-10 TABLET ORAL EVERY 6 HOURS PRN
Qty: 56 TABLET | Refills: 0 | Status: SHIPPED | OUTPATIENT
Start: 2020-02-12 | End: 2020-02-19

## 2020-02-12 RX ADMIN — FENTANYL CITRATE 75 MCG: 50 INJECTION, SOLUTION INTRAMUSCULAR; INTRAVENOUS at 11:00

## 2020-02-12 RX ADMIN — KETOROLAC TROMETHAMINE 15 MG: 30 INJECTION, SOLUTION INTRAMUSCULAR; INTRAVENOUS at 11:10

## 2020-02-12 ASSESSMENT — PAIN SCALES - GENERAL: PAINLEVEL: EXTREME PAIN (9)

## 2020-02-12 NOTE — NURSING NOTE
20 gauge Peripheral IV inserted into right arm - attempts: 1    Cha Molina, RN-BSN  [facility]

## 2020-02-12 NOTE — PROGRESS NOTES
Pre procedure Diagnosis: facet arthropathy   Post procedure Diagnosis: Same  Procedure performed: L3, L4, L5/sacral ala lumbar radiofrequency ablation   Anesthesia: moderate sedation with 75mcg Fentanyl IV and 15mg of toradol  Complications: none  Operators: Ashlyn Gamble MD        Indications:   Jailene Breen is a 54 year old male who has been seen by myself in clinic multiple times in the past. They have a history of ankylosis spondylitis and spondylolisthesis.  Exam shows tenderness to palpation of the lumbar paraspinous muscles and pain with extension and rotation and they have tried conservative treatment including medications, prior facet joint injections and prior RFA procedures.    Jailene Breen has had prior medial branch RFA and prior facet joint injections with good relief of pain.    He currently is off of his Enbrel and having a flare of his inflammatory arthritis.    Options/alternatives, benefits and risks were discussed with the patient including bleeding, infection, no pain relief, tissue trauma, exposure to radiation, reaction to medications including seizure, spinal cord injury,increased pain after the procedure, weakness, numbness or sensory changes and headache.   We also discussed risks of sedation, including reaction to medications and cardiovascular collapse.    Questions were answered to his satisfaction and he agrees to proceed. Voluntary informed consent was obtained and signed.     Vitals were reviewed: Yes  Allergies were reviewed:  Yes  Medications were reviewed:  Yes  Pre-procedure pain score: 9/10    Procedure:  After getting informed consent, patient was brought into the procedure suite and was placed in a prone position on the procedure table.   A Pause for the Cause was performed.  Patient was prepped and draped in sterile fashion.     Jailene Breen had an IV line placed prior the procedure.  The C-arm was positioned in the RIGHT oblique view to afford optimal view of the  L4-L5 vertebral bodies. Lidocaine 1% was used to anesthetize the skin at each level.  At each level, 15cm, 20G curved radiofrequency cannula with a 10mm active tip was positioned, overlying the intersection of the transverse process and pedicle at L4 & L5, and was advanced under intermittent fluoroscopy until it contacted the transverse process and notch, and the tip slightly overran that process, just lateral to the mamillary process.  The position of each cannula was verified and optimized in the oblique view and AP views.    In the AP view, another cannula was placed at the sacral alar notch.      Each position was tested for motor and sensory stimulation, and was positioned so that stimulation was negative for stimuli outside the immediate area of the desired lesion.  Sensory stimulation was completed at 50 Hz, with max stimulation up to 0.8V.  Motor stimulation was completed at 2Hz, up to 2.5V.   Bupivacaine mixed 1:1 with lidocaine 1% 1.2ml was injected, and a 90 second, 80 degree Centigrade lesion was generated.    The needles were then rotated within the pathway of the medial branch, and locations were evaluated with repeat imaging.  Motor testing was again completed, and showed appropriate stimulation.  A second lesion was then generated at each location.    The needles were flushed with the local mixture  as they were withdrawn.      Hemostasis was achieved, the area was cleaned, and bandaids were placed when appropriate.  The patient tolerated the procedure well, and was taken to the recovery room.    Images were saved to PACS.    Sedation start time:  1045  MD Time OUT: 1108    Post-procedure pain score: 3/10  Follow-up includes:   -f/u phone call in one week  -post-procedure pain medications: oxycodone 5-10mg every 6 hours as needed, max of 8/day x 1 week, hope that is enough (has used longer dosing in the past).  No driving while on higher doses of meds  -f/u with Susana Gamble,  MD  [facility]    [facility] Procedure Note        Sedation:      Performed by: Silvia Gamble  Authorized by: Silvia Gamble    Pre-Procedure Assessment done at 1030    Expected Level:  Moderate Sedation    Indication:  Sedation is required to allow for Right lumbar RFA    Consent obtained from patient after discussing the risks, benefits and alternatives.    PO Intake:  Appropriately NPO for procedure    ASA Class:  Class 2 - MILD SYSTEMIC DISEASE, NO ACUTE PROBLEMS, NO FUNCTIONAL LIMITATIONS.    Mallampati:  Grade 1:  Soft palate, uvula, tonsillar pillars, and posterior pharyngeal wall visible    Lungs: Lungs Clear with good breath sounds bilaterally.     Heart: Normal heart sounds and rate    History and physical reviewed and no updates needed. I have reviewed the lab findings, diagnostic data, medications, and the plan for sedation. I have determined this patient to be an appropriate candidate for the planned sedation and procedure and have reassessed the patient IMMEDIATELY PRIOR to sedation and procedure.      Sedation Post Procedure Summary:    Prior to the start of the procedure and with procedural staff participation, I verbally confirmed the patient s identity using two indicators, relevant allergies, that the procedure was appropriate and matched the consent or emergent situation, and that the correct equipment/implants were available. Immediately prior to starting the procedure I conducted the Time Out with the procedural staff and re-confirmed the patient s name, procedure, and site/side. (The Joint Commission universal protocol was followed.)  Yes      Sedatives: fentanyl    Vital signs, airway, and pulse oximetry were monitored and remained stable throughout the procedure and sedation was maintained until the procedure was complete.  The patient was monitored by staff until sedation discharge criteria were met.    Patient tolerance: Patient tolerated the procedure  well with no immediate complications.    Time of sedation in minutes:  23 minutes from beginning to end of physician one to one monitoring.

## 2020-02-12 NOTE — NURSING NOTE
Pre-procedure Intake    Have you been fasting? Yes    If yes, for how long? More than 8 hours    Are you taking a prescribed blood thinner such as coumadin, Plavix, Xarelto?    No    If yes, when did you take your last dose? NA    Do you take aspirin?  No    If cervical procedure, have you held aspirin for 6 days?   NA    Do you have any allergies to contrast dye, iodine, steroid and/or numbing medications?  NO    Are you currently taking antibiotics or have an active infection?  NO    Have you had a fever/elevated temperature within the past week? NO    Are you currently taking oral steroids? NO    Do you have a ? Yes       Are you pregnant or breastfeeding?  NO    Are the vital signs normal?  Yes      Monica Palma CMA ([facility])

## 2020-02-12 NOTE — NURSING NOTE
MD Time IN: 1043     Sedation start time:  1045  Sedation end time:  1108    Medications given: fentanyl 75 mcg IV; versed 0 mg IV; toradol 15 mg IV  Intravenous fluids were administered, normal saline 225 cc's.  Sedation Level Achieved:  Moderate (conscious) sedation  Discharge Information    IV Discontiued Time:  1130 catheter intact    Discharge Criteria = When patient returns to baseline or as per MD order    Consciousness:  Pt is fully awake    Circulation:  BP +/- 20% of pre-procedure level    Respiration:  Patient is able to breathe deeply    O2 Sat:  Patient is able to maintain O2 Sat >92% on room air    Activity:  Moves 4 extremities on command    Ambulation:  Patient is able to stand and walk or stand and pivot into wheelchair    Dressing:  Clean/dry or No Dressing    Notes:   Discharge instructions and AVS given to patient    Patient meets criteria for discharge?  YES    Admitted to PCU?  No    Responsible adult present to accompany patient home?  Yes    Signature/Title:    dariel kennedy RN  RN Care Coordinator  [facility] [place]

## 2020-02-12 NOTE — PATIENT INSTRUCTIONS
[facility]   Radiofrequency Ablation (RFA) Discharge Instructions     Today you saw:     Dr. Silvia Gamble        You should anticipate procedural pain for up to 2 weeks.     You may receive a prescription for pain medication and you should take this as directed.    Short acting oxycodone provided, take 1-2 tabs every 6 hours as needed.  Wean as able over the next week.   Do not drive on higher dose of medication    It may take up to 8 weeks to receive relief from the RFA     Follow up with JUANITA Ramon CNP in 8 weeks.     If you received sedation before, during or after your procedure, for the next 24 hours you shall NOT:    -Drive    -Operate machinery    -Drink alcohol    -Sign any legal documents     You may resume your normal diet     You may resume your regular medications after the procedure     If you were holding your blood thinning medication, please restart taking it: N/A    Be cautious with walking. Numbness and/or weakness in the lower extremities may occur for up to 6-8 hours due to effect of local anesthetic    Avoid strenuous activity for the first 24 hours     You may resume your regular activities after 24 hours     You may shower, however no swimming, tub baths or hot tubs for 24 hours following your procedure     You may use ice packs 10-15 minutes three to four times a day at the injection site for comfort     Do not use heat to painful areas for 6 to 8 hours. This will give the local anesthetic time to wear off and prevent you from accidentally burning your skin.     Unless you have been directed to avoid the use of anti-inflammatory medications (NSAIDS), you may use medications such as ibuprofen, Aleve or Tylenol for pain control if needed.     If you experience any of the following, call the Pain Clinic during work hours (Monday through Friday 8 am-4:30 pm) at [phone] or the Provider Line after hours at [phone]:   -Fever over 100 degree F     -Swelling, bleeding, redness, drainage, warmth at the injection site    -Progressive weakness or numbness in your legs or arms    -Loss of bowel or bladder function    -Unusual headache that is not relieved by Tylenol    -Unusual new onset of pain that is not improving

## 2020-02-18 ENCOUNTER — TELEPHONE (OUTPATIENT)
Dept: PALLIATIVE MEDICINE | Facility: CLINIC | Age: 55
End: 2020-02-18

## 2020-02-18 NOTE — TELEPHONE ENCOUNTER
Patient had a radiofrequency ablation (RFA) on 2/12/20.  Called patient for an update.      Pt reported the following details:  Has some discomfort and soreness from the procedure. Has not noticed ant relief or the discomfort subsiding yet.      Told patient that the information will be forwarded to his/her care team.      Reminded pt that the nerves were irritated during the procedure so it may cause increased pain for up to 2 weeks.      In addition, reminded pt that it may take up to 8 weeks to feel the full effect of the procedure.      If any more questions or concerns, pt was instructed to call the clinic at [phone].    Is patient's pain tolerable at this time on current medication regime? YES

## 2020-02-19 ENCOUNTER — TELEPHONE (OUTPATIENT)
Dept: PALLIATIVE MEDICINE | Facility: CLINIC | Age: 55
End: 2020-02-19

## 2020-02-19 DIAGNOSIS — M47.819 FACET ARTHROPATHY: ICD-10-CM

## 2020-02-19 DIAGNOSIS — G89.18 PAIN ASSOCIATED WITH SURGICAL PROCEDURE: ICD-10-CM

## 2020-02-19 DIAGNOSIS — G89.29 OTHER CHRONIC PAIN: ICD-10-CM

## 2020-02-19 RX ORDER — OXYCODONE HYDROCHLORIDE 5 MG/1
5-10 TABLET ORAL EVERY 6 HOURS PRN
Qty: 40 TABLET | Refills: 0 | Status: SHIPPED | OUTPATIENT
Start: 2020-02-19 | End: 2020-04-03

## 2020-02-19 NOTE — TELEPHONE ENCOUNTER
2/12/20 Procedure performed: L3, L4, L5/sacral ala lumbar radiofrequency ablation.     Patient was prescribed oxycodone 5 mg #56 for post procedural pain. He took his last medication this morning and reports he is really still hurting. He is requesting a refill of the oxycodone to [facility]. We discussed that the pain will improve over time and he reports it is worse than the last time he had the procedure.       KERLINE RivasN, RN  Care Coordinator  [facility] [place]

## 2020-02-19 NOTE — TELEPHONE ENCOUNTER
Script Eprescribed to pharmacy    Will send this to MA team to notify patient.  Advise 40 tabs given    Signed Prescriptions:                        Disp   Refills    oxyCODONE 5 MG PO tablet                   40 tab*0        Sig: Take 1-2 tablets (5-10 mg) by mouth every 6 hours as           needed for severe pain Max 8 tabs/day.  Authorizing Provider: MARKIE NOE MD  [facility]

## 2020-03-09 ENCOUNTER — MYC MEDICAL ADVICE (OUTPATIENT)
Dept: PALLIATIVE MEDICINE | Facility: CLINIC | Age: 55
End: 2020-03-09

## 2020-03-09 DIAGNOSIS — M25.562 CHRONIC PAIN OF BOTH KNEES: ICD-10-CM

## 2020-03-09 DIAGNOSIS — G89.29 CHRONIC PAIN OF BOTH KNEES: ICD-10-CM

## 2020-03-09 DIAGNOSIS — M54.59 LUMBAR FACET JOINT PAIN: ICD-10-CM

## 2020-03-09 DIAGNOSIS — M51.369 DDD (DEGENERATIVE DISC DISEASE), LUMBAR: ICD-10-CM

## 2020-03-09 DIAGNOSIS — M53.3 SACROILIAC JOINT PAIN: ICD-10-CM

## 2020-03-09 DIAGNOSIS — M45.7 ANKYLOSING SPONDYLITIS OF LUMBOSACRAL REGION (H): ICD-10-CM

## 2020-03-09 DIAGNOSIS — M25.561 CHRONIC PAIN OF BOTH KNEES: ICD-10-CM

## 2020-03-09 DIAGNOSIS — M79.18 MYOFASCIAL PAIN: ICD-10-CM

## 2020-03-09 DIAGNOSIS — M47.816 SPONDYLOSIS OF LUMBAR REGION WITHOUT MYELOPATHY OR RADICULOPATHY: ICD-10-CM

## 2020-03-09 DIAGNOSIS — G89.4 CHRONIC PAIN SYNDROME: ICD-10-CM

## 2020-03-09 NOTE — TELEPHONE ENCOUNTER
Routed to the nursing pool and to the MA pool to process refill(s).    Cha Molina, RN-BSN  [facility]

## 2020-03-09 NOTE — TELEPHONE ENCOUNTER
Patient requesting refill(s) of oxyCODONE (OXYCONTIN) 10 MG 12 hr tablet  Called pharmacy- Last dispensed from pharmacy on 02/07/2020    Pt last seen by prescribing provider on 01/21/2020  Next appt scheduled for 04/03/2020     checked in the past 6 months? Yes If no, print current report and give to RN    Last urine drug screen date 01/21/2020  Current opioid agreement on file (completed within the last year) Yes Date of opioid agreement: 01/21/2020    Processing (pick one and delete the others):      E-prescribe to [id number]    Will route to nursing pool for review and preparation of prescription(s).

## 2020-03-09 NOTE — TELEPHONE ENCOUNTER
Medication refill information reviewed.     Last due:  Fill on 2/6/2020. Begin 2/9/20    Due date:  3/10/2020    Weaning instructions:  N/A    Prescriptions prepped for review.     Cha Molina RN-BSN  [facility]

## 2020-03-10 RX ORDER — OXYCODONE HCL 10 MG/1
10 TABLET, FILM COATED, EXTENDED RELEASE ORAL EVERY 12 HOURS
Qty: 60 TABLET | Refills: 0 | Status: SHIPPED | OUTPATIENT
Start: 2020-03-10 | End: 2020-04-03

## 2020-03-10 NOTE — TELEPHONE ENCOUNTER
Signed Prescriptions:                        Disp   Refills    oxyCODONE (OXYCONTIN) 10 MG 12 hr tablet   60 tab*0        Sig: Take 1 tablet (10 mg) by mouth every 12 hours Fill           now. Begin 3/10/2020  Authorizing Provider: SUSANA PETTY    Reviewed [place]  March 10, 2020- no concerning fills.    Susana GRANT, RN CNP, FNP  [facility]  [facility]

## 2020-03-24 ENCOUNTER — MYC REFILL (OUTPATIENT)
Dept: FAMILY MEDICINE | Facility: CLINIC | Age: 55
End: 2020-03-24

## 2020-03-24 DIAGNOSIS — F17.200 SMOKING ADDICTION: ICD-10-CM

## 2020-03-24 NOTE — TELEPHONE ENCOUNTER
"Requested Prescriptions   Pending Prescriptions Disp Refills     nicotine (NICORETTE) 2 MG gum 240 each 11     Sig: Place 1 each (2 mg) inside cheek as needed for smoking cessation       Partial Cholinergic Nicotinic Agonist Agents Failed - 3/24/2020  3:18 PM        Failed - Blood pressure under 140/90 in past 12 months     BP Readings from Last 3 Encounters:   02/12/20 (!) 141/91   01/21/20 134/86   10/15/19 (!) 141/94                 Passed - Recent (12 mo) or future (30 days) visit within the authorizing provider's specialty     Patient has had an office visit with the authorizing provider or a provider within the authorizing providers department within the previous 12 mos or has a future within next 30 days. See \"Patient Info\" tab in inbasket, or \"Choose Columns\" in Meds & Orders section of the refill encounter.              Passed - Medication is active on med list        Passed - Patient is 18 years of age or older           Last Written Prescription Date:  12/10/19  Last Fill Quantity: 240,  # refills: 11   Last office visit: 3/27/2019 with prescribing provider: Mal    Future Office Visit:   Next 5 appointments (look out 90 days)    Apr 03, 2020  9:30 AM CDT  Telephone Visit with JUANITA Ga CNP  [facility] [place] ([facility] [place]) [id number]  [place] [id number] [phone]           "

## 2020-03-30 ENCOUNTER — MYC MEDICAL ADVICE (OUTPATIENT)
Dept: PALLIATIVE MEDICINE | Facility: CLINIC | Age: 55
End: 2020-03-30

## 2020-03-30 NOTE — TELEPHONE ENCOUNTER
From: Jamison Breen      Created: 3/30/2020 8:52 AM        *-*-*This message has not been handled.*-*-*    Hi, I'm in a really bad situation.  I've been sick for over a week had a fever and couldn't take my RA injections.  I'm in so much pain right now it puts me in tears. My rheumatologist was with [facility] but they terminated services due to being behind with [facility] and won't even get a message to her. I don't know what to do, I seriously can't even move because of the pain.        Will forward to Susana to review and advise    Josias Lopez, RN  Care Coordinator   [facility] [place]

## 2020-04-01 NOTE — TELEPHONE ENCOUNTER
From: Jamison Breen      Created: 3/31/2020 10:11 PM        *-*-*This message was handled on 4/1/2020 10:06 AM by JOSIAS SHAHID*-*-*    I do have an appointment set up over the phone on Friday.  I'm just hurting so bad, every joint in my arms,  legs shoulders and hips are extremely painful and puts me almost in tears.          From: Josias Shahid RN      Created: 4/1/2020 10:06 AM        Saul,       You are scheduled for a phone visit with Susana on Friday at 0930.  You should get a call from staff at 0900 to check you in and then Susana will call around 9:30.     Susana had recommended that you reach out to your primary care and to do the [email or web address].  If you haven't done that yet I would ask that you do that before your Appt with Susana.     Thanks     Josias Shahid RN  Care Coordinator   [facility]         Josias Shahid RN  Care Coordinator   [facility]

## 2020-04-01 NOTE — TELEPHONE ENCOUNTER
Hi Les-     I am so sorry to hear that you are struggling.  Since you have been ill, I would reach out to your Primary Care Provider to discuss if you should be tested for possible COVID-19 or you can send in an online request re: this via [employer].     Are you able to send your rheumatologist a Respons[employer] message within the [employer] system to discuss how to proceed with re: to your rheumatological medications?     What specifically do you need from me? You can call our clinic and set up a telephone visit with me as we are not currently seeing patients in person in the clinic at this time due to COVID-19 outbreak.      Thanks.  Susana GRANT RN CNP, XUAN  [facility]  [facility]    I have sent the above BBE message to the patient.     Susana GRANT RN CNP, XUAN  [facility]

## 2020-04-03 ENCOUNTER — VIRTUAL VISIT (OUTPATIENT)
Dept: PALLIATIVE MEDICINE | Facility: CLINIC | Age: 55
End: 2020-04-03

## 2020-04-03 DIAGNOSIS — G89.4 CHRONIC PAIN SYNDROME: ICD-10-CM

## 2020-04-03 DIAGNOSIS — M54.59 LUMBAR FACET JOINT PAIN: Primary | ICD-10-CM

## 2020-04-03 DIAGNOSIS — M25.561 CHRONIC PAIN OF BOTH KNEES: ICD-10-CM

## 2020-04-03 DIAGNOSIS — M45.7 ANKYLOSING SPONDYLITIS OF LUMBOSACRAL REGION (H): ICD-10-CM

## 2020-04-03 DIAGNOSIS — M25.50 MULTIPLE JOINT PAIN: ICD-10-CM

## 2020-04-03 DIAGNOSIS — M47.816 SPONDYLOSIS OF LUMBAR REGION WITHOUT MYELOPATHY OR RADICULOPATHY: ICD-10-CM

## 2020-04-03 DIAGNOSIS — M51.369 DDD (DEGENERATIVE DISC DISEASE), LUMBAR: ICD-10-CM

## 2020-04-03 DIAGNOSIS — M47.819 FACET ARTHROPATHY: ICD-10-CM

## 2020-04-03 DIAGNOSIS — M25.562 CHRONIC PAIN OF BOTH KNEES: ICD-10-CM

## 2020-04-03 DIAGNOSIS — M53.3 SACROILIAC JOINT PAIN: ICD-10-CM

## 2020-04-03 DIAGNOSIS — G89.29 CHRONIC PAIN OF BOTH KNEES: ICD-10-CM

## 2020-04-03 DIAGNOSIS — M79.18 MYOFASCIAL PAIN: ICD-10-CM

## 2020-04-03 PROCEDURE — 99442 ZZC PHYSICIAN TELEPHONE EVALUATION 11-20 MIN: CPT | Performed by: NURSE PRACTITIONER

## 2020-04-03 RX ORDER — OXYCODONE HCL 10 MG/1
10 TABLET, FILM COATED, EXTENDED RELEASE ORAL EVERY 12 HOURS
Qty: 60 TABLET | Refills: 0 | Status: SHIPPED | OUTPATIENT
Start: 2020-04-03 | End: 2020-04-24

## 2020-04-03 RX ORDER — OXYCODONE HYDROCHLORIDE 5 MG/1
5 TABLET ORAL EVERY 6 HOURS PRN
Qty: 40 TABLET | Refills: 0 | Status: SHIPPED | OUTPATIENT
Start: 2020-04-03 | End: 2020-04-21

## 2020-04-03 ASSESSMENT — PAIN SCALES - GENERAL: PAINLEVEL: WORST PAIN (10)

## 2020-04-03 NOTE — PROGRESS NOTES
The patient has been notified of following:     This telephone visit will be conducted via a call between you and your provider. We have found that certain health care needs can be provided without the need for a physical exam.  This service lets us provide the care you need with a phone conversation.  If a prescription is necessary we can send it directly to your pharmacy.  If lab work is needed we can place an order for that and you can then stop by our lab to have the test done at a later time. This is a billable service but we do not know the cost at this time.     Sherron Jennings MA on 4/3/2020 at 9:07 AM

## 2020-04-03 NOTE — PROGRESS NOTES
[facility]    4/3/2020    Jailene Breen is a 54 year old male who is being evaluated via a billable telephone visit.        Chief complaint: lower  back pain. Bilateral knee pain, hand pain, shoulder pain.        Interval history:   Jailene Breen is a 54 year old male is known to me for   Lumbar spondylosis, pain is worse with extension and rotation indicating a facetogenic component to pain  Lumbar degenerative disc disease  SI joint pain  Ankylosing spondylitis  Myofascial pain  Chronic pain syndrome  PMHx includes: Panic attacks, back pain, arthritis, anxiety, ankylosing spondylitis  PSHx includes: Right knee surgery ×2, left foot surgery right knee arthroscopy        Recommendations/plan at the last visit on 1/21/2020 included:  1. Recommended to increase activity while pacing himself  2. Physical Therapy:  He is not currently involved with PHYSICAL THERAPY   3. Clinical Health Psychologist: none  4. Diagnostic Studies:  none  5. Medication Management:    1. Continue OxyContin 10mg BID.   2. Continue Flexeril 5-10mg TID PRN  6. Further procedures recommended: Patient to schedule repeat lumbar RFA, office to contact patient.  7. Recommendations to PCP: see above  8. Follow up: 8-12 weeks  9. UDS, last took OxyContin last night, overslept and did not take AM dose.  10. Re-signed opiate agreement form today.        Since his last visit, Jailene Breen reports:  -has been off of Enbrel for 2 weeks as he was not feeling well.  -he has had a pain flare off of his medication  -he re-started Enbrel today, but usually it takes 2 weeks for him to really begin feeling better.   -he is behind on his bills with his rheumatologist in [facility] and they will not see him. He is afraid he will run out of his Enbrel. Encouraged patient to speak with a patient representative to help this be smoothed over as he working on catching up on those bills.   -he is worried about being abandoned.   -his hands hurt  "so bad it is hard to  a doorknob  -his hands and feet are bad. He continues to have multiple joint pain  -his last lumbar RFA was helpful, on the right side. The left sided low back is more painful.           At this point, the patient's participation with our multidisciplinary team includes:  The patient has been compliant with the program.    PT - has seen Cyndee White, none recently  Health Psych - worked with  Padilla Abebe Otero, last on 11/6/2018.  Padilla Keene is no longer with our clinic.       Pain scores:    Pain intensity on average is 7 on a scale of 0-10.    Range is 6-10/10.   Pain right now is 10/10. (has been off of his DMARD meds)  Pain is described as \"shooting, throbbing, penetrating, and stabbing.\"  Pain is constant in nature    Current pain relevant medications:      -nortriptyline 50mg at bedtime (helpful)  -Enbrel 50mg/mL   -ibuprofen 800mg TID PRN (using 3 times daily-helpful)  -Tylenol 500mg using 1000mg TID (unsure if helpful)  -Maxalt 10mg PRN migraine (helpful for migraine--he does have visual aura)  -OxyContin 10mg BID (helpful)  -Flexeril 10mg TID PRN (helpful)  -naloxone nasal spray PRN for accidental opiate overdose      Other pertinent medications:   -clonazepam 1mg tab, may take 0.5-1mg BID PRN anxiety (helpful, has been using infrequently)      Previous Medications:   OPIATES: Oxycodone (helpful), Fentanyl (100mcg/hr patch helpful), hydrocodone (itching), Tramadol (not helpful)   NSAIDS: ibuprofen (not helpful), Aleve (not helpful)  MUSCLE RELAXANTS: methocarbamol (somewhat helpful), Flexeril (somewhat helpful), tizanidine (unsure if helpful), metaxalone (unsure), SOMA (helpful)  ANTI-MIGRAINE MEDS: Maxalt (helpful for migraine), Imitrex injection (somewhat helpful)  ANTI-DEPRESSANTS: Prozac (helpful), Cymbalta (felt weird on it), nortriptyline (unsure if helpful), Buspar (unsure), Remeron (blacked out), bupropion (not helpful for smoking cessation)  SLEEP AIDS: Ambien " (helpful)  ANTI-CONVULSANTS: gabapentin (felt odd on this medication, unsure of dose), Lyrica (not helpful for 4 months, unsure of dose), Topamax (not helpful, he felt weird on it)   TOPICALS: Lidocaine patches (not helpful), Voltaren gel (not helpful)  Other meds: Tylenol (unsure if helpful)        Other treatments have included:   Jailene Breen has been seen at a pain clinic in the past.  [facility]  PT: tried, not helpful  Chiropractic: tried, not helpful  Acupuncture: none  TENs Unit: tried, helpful     Injections:     -has had injections at outside clinics  -has had epidural injections for low back pain (one was helpful)  -he has had lumbar medial branch blocks at [facility] and he was going to have lumbar radiofrequency ablation (did not do this due to cost)  -4/3/2017 right LMBBs with Dr. Ashlyn Gamble (helpful)  -4/26/2017 right LMBBs with Dr. Ashlyn Gamble (helpful)  -5/31/2017 right L3, L4, L5 RFA with Dr. Ashlyn Gamble (good relief for over 6 months)  -3/15/2018 right L5 transforaminal MAKAYLA with Dr. Ashlyn Gamble (not helpful)  -5/10/2018 trigger point injections with Dr. Ashlyn Gamble(somewhat helpful).  -7/12/2018 Right L3, L4, L5 RFA with Dr. Ashlyn Gamble (helpful).  -9/20/2018 Left piriformis injections, bilateral gluteal trigger point with Dr. Gamble (helpful).  -1/31/2019 right L2-3, L3-4 and L4-5 facet joint injections with Dr. Ashlyn Gamble (somewhat helpful)  4/4/2019  L3, L4, L5/sacral ala lumbar radiofrequency ablation with Dr. Gamble (helpful over right side)  6/28/2019 Bilateral facet joint injections at l4-5, L5-S1 with Dr. Holley (only helpful for 7 days)            Side Effects: no side effect  Patient is using the medication as prescribed: YES    Medications:  Current Outpatient Medications   Medication Sig Dispense Refill     albuterol (PROAIR HFA/PROVENTIL HFA/VENTOLIN HFA) 108 (90 Base) MCG/ACT inhaler Inhale 2 puffs into the lungs every 6 hours as needed for  shortness of breath / dyspnea 1 Inhaler 3     atenolol (TENORMIN) 25 MG tablet Take 25 mg by mouth daily       clonazePAM (KLONOPIN) 1 MG tablet Take 0.5-1 tablets (0.5-1 mg) by mouth daily as needed for anxiety 30 tablet 1     cyclobenzaprine (FLEXERIL) 10 MG tablet Take 0.5-1 tablets (5-10 mg) by mouth 3 times daily as needed for muscle spasms Caution sedation 60 tablet 2     etanercept (ENBREL) 50 MG/ML injection        folic acid (FOLVITE) 1 MG tablet Reported on 5/19/2017       IBUPROFEN PO Take 800 mg by mouth every 6 hours as needed for moderate pain Reported on 5/19/2017       naloxone (NARCAN) 4 MG/0.1ML nasal spray Spray 1 spray (4 mg) into one nostril alternating nostrils as needed for opioid reversal every 2-3 minutes until assistance arrives (Patient not taking: Reported on 2/12/2020) 0.2 mL 0     nicotine (NICORETTE) 2 MG gum Place 1 each (2 mg) inside cheek as needed for smoking cessation 240 each 2     oxyCODONE (OXYCONTIN) 10 MG 12 hr tablet Take 1 tablet (10 mg) by mouth every 12 hours Fill now. Begin 3/10/2020 60 tablet 0     oxyCODONE 5 MG PO tablet Take 1-2 tablets (5-10 mg) by mouth every 6 hours as needed for severe pain Max 8 tabs/day. 40 tablet 0     predniSONE (DELTASONE) 20 MG tablet Take 1 tablet (20 mg) by mouth daily (Patient not taking: Reported on 2/12/2020) 5 tablet 0     sertraline (ZOLOFT) 100 MG tablet Take 1 tablet (100 mg) by mouth daily (Patient not taking: Reported on 2/12/2020) 90 tablet 1     sertraline (ZOLOFT) 50 MG tablet Take 1 tablet (50 mg) by mouth daily 90 tablet 1     sildenafil (REVATIO) 20 MG tablet Take 2-5 tabs one hour before sex (Patient not taking: Reported on 2/12/2020) 30 tablet 3       Medical History: any changes in medical history since they were last seen? No    Social History:    Home situation: , has 2 children in college  Occupation/Schooling: currently unemployed, worked as a  (unemployed since 3/1/2017). 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  Uses chew  If yes, was cessation counseling offered?  Not today    Review of Systems:    The 14 system ROS was reviewed from the intake questionnaire, and is positive for:  Constitutional: fever/chills, fatigue, weight gain, weight loss  Eyes/Head: headache, dizziness  ENT: ringing in ears  Allergy/Immune: allergies  Skin: itching, rash, hives  Hematologic: easy bruising  Respiratory: cough, wheezing, shortness of breath  Cardiovascular: swelling in feet, fainting, palpitations, chest pain  GI: abdominal pain, nausea, vomiting, diarrhea, constipation  Endocrine: steroid use  Musculoskeletal:  joint pain, arthritis, stiffness, gout, back pain, neck pain  Urinary: frequency, urgency, incontinence, hesitancy  Neurologic: weakness, numbness/tingling, seizure, stroke, memory loss  Mental health: depression, anxiety, stress, suicidal ideation        Physical Exam:     Vital signs: There were no vitals taken for this visit.     Behavioral observations:  Awake, alert, cooperative.           [place] Prescription Monitoring [place]:  Reviewed [place] [facility] 4/3/2020- no concerning fills.  Susana RGANT RN CNP, FNP  [facility]        DIRE Score for selecting candidates for long term opioid analgesia for chronic pain:  Diagnosis  2  Intractablility  2  Risk    Psychological health  2    Chemical health  2    Reliability  2    Social support  2  Efficacy  2    Total DIRE Score = 14. Note that  7-13 predicts poor outcome (compliance and efficacy) from opioid prescribing; 14-21 predicts good outcome (compliance and efficacy)  from opioid prescribing.      Assessment:     1. Lumbar facet joint pain  2. DDD, lumbar  3. SI joint pain  4. Ankylosing spondylitis of lumbosacral region  5. Myofascial pain  6. Chronic pain  syndrome  7. Chronic pain of both knees   8. Multiple joint pain  9. Spondylosis of lumbar region without myelopathy or radiculopathy  10. Facet arthropathy    11. Encounter for long-term opiate analgesic use  12. PMHx includes: Panic attacks, back pain, arthritis, anxiety, ankylosing spondylitis  13. PSHx includes: Right knee surgery ×2, left foot surgery right knee arthroscopy      Plan:    1. Recommended to increase activity while pacing himself  2. Physical Therapy:  He is not currently involved with PHYSICAL THERAPY   3. Clinical Health Psychologist: none  4. Diagnostic Studies:  none  5. Medication Management:    1. Continue OxyContin 10mg BID.   2. Continue Flexeril 5-10mg TID PRN  3. Short-script of oxycodone 5mg tabs max 4/day for flare  6. Further procedures recommended:none  7. Recommendations to PCP: see above  8. Follow up: 8 weeks      Phone call duration: 20 minutes        Susana GRANT, RN CNP, FNP  [facility]  [facility]

## 2020-04-15 ENCOUNTER — MYC REFILL (OUTPATIENT)
Dept: FAMILY MEDICINE | Facility: CLINIC | Age: 55
End: 2020-04-15

## 2020-04-15 DIAGNOSIS — F17.200 SMOKING ADDICTION: ICD-10-CM

## 2020-04-15 NOTE — TELEPHONE ENCOUNTER
"Requested Prescriptions   Pending Prescriptions Disp Refills     nicotine (NICORETTE) 2 MG gum  Last Written Prescription Date:  03/24/2020  Last Fill Quantity: 240,  # refills: 2   Last Office Visit with STEVE, KYLAH or [facility] prescribing provider:  03/27/19Shea   Future Office Visit:    Next 5 appointments (look out 90 days)    May 29, 2020  9:00 AM CDT  Telephone Visit with JUANITA Ga CNP  [facility] [address] [facility] [id number] [phone]        240 each 2     Sig: Place 1 each (2 mg) inside cheek as needed for smoking cessation       Partial Cholinergic Nicotinic Agonist Agents Failed - 4/15/2020 10:42 AM        Failed - Blood pressure under 140/90 in past 12 months     BP Readings from Last 3 Encounters:   02/12/20 (!) 141/91   01/21/20 134/86   10/15/19 (!) 141/94                 Failed - Recent (12 mo) or future (30 days) visit within the authorizing provider's specialty     Patient has had an office visit with the authorizing provider or a provider within the authorizing providers department within the previous 12 mos or has a future within next 30 days. See \"Patient Info\" tab in inbasket, or \"Choose Columns\" in Meds & Orders section of the refill encounter.              Passed - Medication is active on med list        Passed - Patient is 18 years of age or older             "

## 2020-04-20 ENCOUNTER — VIRTUAL VISIT (OUTPATIENT)
Dept: FAMILY MEDICINE | Facility: CLINIC | Age: 55
End: 2020-04-20

## 2020-04-20 ENCOUNTER — MYC REFILL (OUTPATIENT)
Dept: FAMILY MEDICINE | Facility: CLINIC | Age: 55
End: 2020-04-20

## 2020-04-20 ENCOUNTER — MYC MEDICAL ADVICE (OUTPATIENT)
Dept: PALLIATIVE MEDICINE | Facility: CLINIC | Age: 55
End: 2020-04-20

## 2020-04-20 ENCOUNTER — MYC REFILL (OUTPATIENT)
Dept: PALLIATIVE MEDICINE | Facility: CLINIC | Age: 55
End: 2020-04-20

## 2020-04-20 ENCOUNTER — TELEPHONE (OUTPATIENT)
Dept: PALLIATIVE MEDICINE | Facility: CLINIC | Age: 55
End: 2020-04-20

## 2020-04-20 DIAGNOSIS — G89.4 CHRONIC PAIN SYNDROME: ICD-10-CM

## 2020-04-20 DIAGNOSIS — M47.819 FACET ARTHROPATHY: ICD-10-CM

## 2020-04-20 DIAGNOSIS — F17.200 SMOKING ADDICTION: ICD-10-CM

## 2020-04-20 DIAGNOSIS — M06.9 RHEUMATOID ARTHRITIS INVOLVING MULTIPLE JOINTS (H): Primary | ICD-10-CM

## 2020-04-20 DIAGNOSIS — M06.9 RHEUMATOID ARTHRITIS INVOLVING MULTIPLE SITES, UNSPECIFIED RHEUMATOID FACTOR PRESENCE: Primary | ICD-10-CM

## 2020-04-20 PROCEDURE — 99213 OFFICE O/P EST LOW 20 MIN: CPT | Mod: 95 | Performed by: PHYSICIAN ASSISTANT

## 2020-04-20 RX ORDER — OXYCODONE HYDROCHLORIDE 5 MG/1
5 TABLET ORAL EVERY 6 HOURS PRN
Qty: 40 TABLET | Refills: 0 | Status: CANCELLED | OUTPATIENT
Start: 2020-04-20

## 2020-04-20 NOTE — TELEPHONE ENCOUNTER
Should have refills at pharmacy. Patient to call pharmacy.     Suzy Muhammad RN  [facility]

## 2020-04-20 NOTE — PATIENT INSTRUCTIONS
I was able to get the prescription to the right place- [facility] in [place], [place].

## 2020-04-20 NOTE — PROGRESS NOTES
"Jailene Breen is a 54 year old male who is being evaluated via a billable telephone visit.      The patient has been notified of following:     \"This telephone visit will be conducted via a call between you and your physician/provider. We have found that certain health care needs can be provided without the need for a physical exam.  This service lets us provide the care you need with a short phone conversation.  If a prescription is necessary we can send it directly to your pharmacy.  If lab work is needed we can place an order for that and you can then stop by our lab to have the test done at a later time.    Telephone visits are billed at different rates depending on your insurance coverage. During this emergency period, for some insurers they may be billed the same as an in-person visit.  Please reach out to your insurance provider with any questions.    If during the course of the call the physician/provider feels a telephone visit is not appropriate, you will not be charged for this service.\"    Patient has given verbal consent for Telephone visit?  Yes    How would you like to obtain your AVS? Mail a copy    Subjective     Jailene Breen is a 54 year old male who presents to clinic today for the following health issues:  flare up- patient states he's in a lot of pain, rheumatologist with [facility], but got dropped due to his payment account with them. Ran out of enbrel a few weeks ago        Reviewed and updated as needed this visit by Provider         Review of Systems   RHEUM hx RA  MUSC hx back pain  GEN- no fevers currently but help 2 doses last month due to coryzal and having fevers       Objective   Reported vitals:  There were no vitals taken for this visit.   healthy, alert and no distress  PSYCH: Alert and oriented times 3; coherent speech, normal   rate and volume, able to articulate logical thoughts, able   to abstract reason, no tangential thoughts, no hallucinations   or delusions  His affect " is anxious  RESP: No cough, no audible wheezing, able to talk in full sentences  Remainder of exam unable to be completed due to telephone visits    Diagnostic Test Results:  none         Assessment/Plan:  1. Rheumatoid arthritis involving multiple sites, unspecified rheumatoid factor presence (H)     - etanercept (ENBREL SURECLICK) 50 MG/ML autoinjector; Inject 50 mg Subcutaneous once a week  Dispense: 1 kit; Refill: 2  - RHEUMATOLOGY REFERRAL    rx was called into [facility] speciality pharmacy as patient wasn't sure where his Rx usually comes from.  They will forward this Rx to the correct central mail order pharmacy.  I spoke with that pharmacy and updated chart with their central speciality mail order pharm info .    No follow-ups on file.      Phone call duration:  7 minutes    JASON Rice           Psych/Behavioral

## 2020-04-20 NOTE — TELEPHONE ENCOUNTER
Called Les. He is in a lot of pain right now.  Most of this pain flare is related to his Rheumatoid Arthritis.  He used to go to [facility].  Sounds like he was let go as a patient due money being rasheed to them.  He took his last dose of Enbrel on 04/10/2020.  He tried to get the Enbrel fill with his mail order pharmacy.  Was finally told it could not be filled since he is not a patient of [facility] any more.  He has a call out to his PCP for a new referral to see a new Rheumatologist. He knows it is going to be a long time before he will be seen. Especially with the COVID pandemic.    He is now in so much pain.  States every joint in his body hurts. He does have 10 mg of prednisone on hand that he takes bid when flaring, but says it really does not work well by itself.  He was given a short script of oxycodone 5 mg at his telephone visit with Susana Pike. But that is all gone.  He does take Oxycontin and Flexeril.  He is wondering if Susana can help control this pain flare.    Routing to provider to review    Ellen Solis RN  Care Coordinator  [facility]

## 2020-04-20 NOTE — TELEPHONE ENCOUNTER
Reason for call:  Other   Patient called regarding (reason for call): call back  Additional comments: Pt calling states his [facility] clinic dropped him as a pt and he is unable to to get his RA injection. Pt states his pain is unbearable and he doesn't know what to do without his RA injection.    Phone number to reach patient:  Home number on file [phone] (home)    Best Time:  anytime    Can we leave a detailed message on this number?  YES    Travel screening: Not Applicable     Elana MURO    [facility]

## 2020-04-20 NOTE — TELEPHONE ENCOUNTER
Received mychart from patient requesting refill(s) of oxyCODONE (ROXICODONE) 5 MG tablet    Last picked up from pharmacy on 04/03/20    Pt last seen by prescribing provider on 04/03/20    Next appt scheduled for 05/29/20     checked in the past 6 months? Yes If no, print current report and give to RN    Last urine drug screen date 01/21/20  Current opioid agreement on file (completed within the last year) Yes Date of opioid agreement: 01/21/20      Processing (pick one and delete the others):      E-prescribe to     [id number] - [place] - [address]  [address] [facility] [address]  Phone: [phone] Fax: [phone]    Will route to nursing pool for review and preparation of prescription(s).    Sherron Jennings [facility]  [place]

## 2020-04-20 NOTE — TELEPHONE ENCOUNTER
Tariq message from patient on 04/20/2020 at 1332:  Hi, I just talked to Ruperto Resendez and he is going to try and call in a prescription for enbrel since I've been out for 2 weeks. He also gave me a referral for a rheumatologist with in the [facility] system. I know you don't treat my ra, but could you please refill the oxycodone 5mg to get me through till I get back on my enbrel and it takes effect. I know it's a mess hopefully I'll be back on track and in better control of this pain.      Routing to provider to review    Ellen Solis RN  Care Coordinator  [facility]

## 2020-04-21 ENCOUNTER — MYC MEDICAL ADVICE (OUTPATIENT)
Dept: FAMILY MEDICINE | Facility: CLINIC | Age: 55
End: 2020-04-21

## 2020-04-21 ENCOUNTER — MYC REFILL (OUTPATIENT)
Dept: FAMILY MEDICINE | Facility: CLINIC | Age: 55
End: 2020-04-21

## 2020-04-21 ENCOUNTER — MYC MEDICAL ADVICE (OUTPATIENT)
Dept: PALLIATIVE MEDICINE | Facility: CLINIC | Age: 55
End: 2020-04-21

## 2020-04-21 ENCOUNTER — TELEPHONE (OUTPATIENT)
Dept: RHEUMATOLOGY | Facility: CLINIC | Age: 55
End: 2020-04-21

## 2020-04-21 DIAGNOSIS — F17.200 SMOKING ADDICTION: ICD-10-CM

## 2020-04-21 RX ORDER — OXYCODONE HYDROCHLORIDE 5 MG/1
5 TABLET ORAL EVERY 6 HOURS PRN
Qty: 40 TABLET | Refills: 0 | Status: SHIPPED | OUTPATIENT
Start: 2020-04-21 | End: 2020-04-24

## 2020-04-21 RX ORDER — OXYCODONE HYDROCHLORIDE 5 MG/1
5 TABLET ORAL EVERY 6 HOURS PRN
Qty: 40 TABLET | Refills: 0 | Status: CANCELLED | OUTPATIENT
Start: 2020-04-21

## 2020-04-21 NOTE — TELEPHONE ENCOUNTER
[facility] Call Center    Phone Message    May a detailed message be left on voicemail: yes     Reason for Call: Other: Les is being referred to Rheumatology for Rheumatoid Arthritis (RA). Please review and call patient to schedule.     Action Taken: Other: Rheumatology    Travel Screening: Not Applicable

## 2020-04-21 NOTE — TELEPHONE ENCOUNTER
Signed Prescriptions:                        Disp   Refills    oxyCODONE (ROXICODONE) 5 MG tablet         40 tab*0        Sig: Take 1 tablet (5 mg) by mouth every 6 hours as needed           for severe pain Max 4 tabs/day. Fill/start           4/21/2020. Short-term script  Authorizing Provider: SUSANA PETTY    Reviewed [facility] April 21, 2020- no concerning fills.    Susana Petty APRN, RN CNP, FNP  [facility]  [facility] [place] [place]

## 2020-04-21 NOTE — TELEPHONE ENCOUNTER
From: Jailene Breen  Sent: 4/21/2020 10:49 AM CDT  To: Susana Aldana YOLANDA  Subject: Updates about my health    I really am starting to hate my life. I can't deal with this pain, can't even put a shirt on or open a door without pain. They still haven't filled my enbrel yet because it's a new doctor sending it.  I don't understand the hold up there.  I just feel so alone now dealing with all of this.  -----------------------------------------------  Routing to provider to review. Is it ok for him to schedule an appointment to speak with you?    KERLINE RivasN, RN  Care Coordinator  [facility]

## 2020-04-21 NOTE — TELEPHONE ENCOUNTER
Called Les.  He is really having hard time with pain.  It is constant and not letting up at all.  Cant lift arms up to put on a shirt or jacket,  Hands are so painful he cant turn door handles.  Waiting on PCP to send over Enbrel script. Advised him to contact PCP to see what the status is on he Enbrel script. Advised him to contact Enbrel help line and ask to speak with an advocate. They may be able to help him with a sample of Enbrel.  He is waiting for the new Rheumatology office to call him to set up his office visit. Advised we will contact him once Susana Pike has reviewed his messages.  He will wait for a call back. States time is going so slow when you are in pain and he does not mean to bother us but he does not know what to do.    Ellen Solis RN  Care Coordinator  [facility]

## 2020-04-21 NOTE — TELEPHONE ENCOUNTER
Routing to provider to review medication prepped per below: See App[employer]art message from 04/20/2020      Oxycodone 5 mg, #40, Refill: No  Sig:  Last picked up 04/03/2020 with start on 04/03/2020  Due : 10 day supply ran out 04/13/2020.    Per last OV note 04/03/2020: Short-script of oxycodone 5mg tabs max 4/day for flare    Follow up scheduled 05/29/2020    Ellen Solis RN  Care Coordinator  [facility]

## 2020-04-21 NOTE — TELEPHONE ENCOUNTER
Duplicate encounter    Ellen Solis RN  Care Coordinator  [facility]

## 2020-04-22 ENCOUNTER — MYC MEDICAL ADVICE (OUTPATIENT)
Dept: FAMILY MEDICINE | Facility: CLINIC | Age: 55
End: 2020-04-22

## 2020-04-22 ENCOUNTER — TELEPHONE (OUTPATIENT)
Dept: FAMILY MEDICINE | Facility: CLINIC | Age: 55
End: 2020-04-22

## 2020-04-22 ENCOUNTER — NURSE TRIAGE (OUTPATIENT)
Dept: FAMILY MEDICINE | Facility: CLINIC | Age: 55
End: 2020-04-22

## 2020-04-22 DIAGNOSIS — F17.200 SMOKING ADDICTION: ICD-10-CM

## 2020-04-22 NOTE — TELEPHONE ENCOUNTER
Called and canceled script at [facility].    Debra Seaman MA on 4/22/2020 at 12:01 PM

## 2020-04-22 NOTE — TELEPHONE ENCOUNTER
Reason for call:  Patient reporting a symptom    Symptom or request: Patient is throwing up and today has seen a little blood in his vomit.     Duration (how long have symptoms been present): 1 day.    Have you been treated for this before? No.    Phone Number patient can be reached at:  Home number on file [phone] (home)    Best Time:  Anytime.    Can we leave a detailed message on this number:  YES.    Call taken on 4/22/2020 at 6:35 PM by Jennifer Sheriff

## 2020-04-22 NOTE — TELEPHONE ENCOUNTER
Patient sent the following [employer] App message regarding refill for nicotine gum:  Hi, your math is off on the nicotine gum, the box only has 109 pieces in it and there is no refills left why u keep asking. I should be in 4 mg, but I prefer the 2 because I can make it last longer. Nobody has called me at all about my enbrel, it's my cell phone and I always answer.        Chantel Staley RN, BSN, PHN

## 2020-04-22 NOTE — TELEPHONE ENCOUNTER
Please see other encounters from 4/22/20. Rx resent as not ever received by pharmacy.    Sara Richardson RN

## 2020-04-22 NOTE — TELEPHONE ENCOUNTER
Routed to the schedulers to call pt and schedule a phone visit with JUANITA Ramon CNP.    Cha Molina RN-BSN  [place] [facility]

## 2020-04-22 NOTE — TELEPHONE ENCOUNTER
Reason for Call:  Other prescription    Detailed comments: Pharmacy would like to request a call back to clarify which one of these medications the pt should be taking. Thank you.    etanercept (ENBREL SURECLICK) 50 MG/ML autoinjector    etanercept (ENBREL) 50 MG/ML injection    Phone Number Patient can be reached at: Other phone number:  [phone]    Best Time: Any    Can we leave a detailed message on this number? Not Applicable    Call taken on 4/22/2020 at 10:51 AM by Cindy Patel

## 2020-04-22 NOTE — TELEPHONE ENCOUNTER
I did try to call them yesterday but the call went nowhere in their system.  The rx has since been transferred to [facility] pharmacy.  Please call to cancel the rx.  Ruperto Resendez PA-C

## 2020-04-22 NOTE — TELEPHONE ENCOUNTER
Spoke to patient . Rx was E-Prepcribed to:     [facility]  [address] [facility] [address]  Phone: [phone] Fax: [phone]    Patient notified that this is a short-term script for 40 tablets.

## 2020-04-22 NOTE — TELEPHONE ENCOUNTER
Yes, he can schedule a telephone visit with me.    Susana GRANT RN CNP, FNP  [facility]  [facility] [place]

## 2020-04-22 NOTE — TELEPHONE ENCOUNTER
My Chart message sent to patient stating Rx resent for following Rx.    Spoke with Charlotte Kendrick [facility]. She stated that they did not receive the nicorette 2 mg gum 240 each with 2 refills. She stated that the pharmacy has not received any nicorette Rx since prior to March 24 2020. Writer will re send nicorette Rx. Rx sent.

## 2020-04-23 NOTE — TELEPHONE ENCOUNTER
Called and discussed provider note with patient.   Les has agreed to this plan and has no further questions at this time.     Naomi Gallegos RN  [facility]

## 2020-04-23 NOTE — TELEPHONE ENCOUNTER
"Les states that he feels fine now. Has had breakfast and plenty of fluids and is feeling fine today.     He just wanted his provider to know about the blood in the toilet when he vomited. States that it was a couple drops that were about the size of a silver dollar.     Routing to provider to please advise on if an appointment would be recommended.     Additional Information    Negative: Vomiting a prescription medication    Negative: Alcohol abuse, known or suspected    Negative: Vomiting is a chronic symptom (recurrent or ongoing AND lasting > 4 weeks)    Answer Assessment - Initial Assessment Questions  1. VOMITING SEVERITY: \"How many times have you vomited in the past 24 hours?\"      - MILD:  1 - 2 times/day     - MODERATE: 3 - 5 times/day, decreased oral intake without significant weight loss or symptoms of dehydration     - SEVERE: 6 or more times/day, vomits everything or nearly everything, with significant weight loss, symptoms of dehydration       2  2. ONSET: \"When did the vomiting begin?\"       yesterday  3. FLUIDS: \"What fluids or food have you vomited up today?\" \"Have you been able to keep any fluids down?\"      No vomiting today. Yes holding fluids down  4. ABDOMINAL PAIN: \"Are your having any abdominal pain?\" If yes : \"How bad is it and what does it feel like?\" (e.g., crampy, dull, intermittent, constant)       No abdominal pain  5. DIARRHEA: \"Is there any diarrhea?\" If so, ask: \"How many times today?\"       no  6. CONTACTS: \"Is there anyone else in the family with the same symptoms?\"       no  7. CAUSE: \"What do you think is causing your vomiting?\"      I have some pretty bad heart. So I am not sure if that is what caused it.   8. HYDRATION STATUS: \"Any signs of dehydration?\" (e.g., dry mouth [not only dry lips], too weak to stand) \"When did you last urinate?\"      No. Urinated 1 hr ago-normal  9. OTHER SYMPTOMS: \"Do you have any other symptoms?\" (e.g., fever, headache, vertigo, vomiting blood or " "coffee grounds, recent head injury)      No  10. PREGNANCY: \"Is there any chance you are pregnant?\" \"When was your last menstrual period?\"        no    Protocols used: VOMITING-A-OH    Naomi Gallegos RN  [facility]    "

## 2020-04-23 NOTE — TELEPHONE ENCOUNTER
If he continues to feel well should be ok.  If vomiting persists should schedule virtual visit  Ruperto Resendez PA-C

## 2020-04-24 ENCOUNTER — VIRTUAL VISIT (OUTPATIENT)
Dept: PALLIATIVE MEDICINE | Facility: CLINIC | Age: 55
End: 2020-04-24

## 2020-04-24 DIAGNOSIS — G89.4 CHRONIC PAIN SYNDROME: ICD-10-CM

## 2020-04-24 DIAGNOSIS — M25.562 CHRONIC PAIN OF BOTH KNEES: ICD-10-CM

## 2020-04-24 DIAGNOSIS — M47.816 SPONDYLOSIS OF LUMBAR REGION WITHOUT MYELOPATHY OR RADICULOPATHY: ICD-10-CM

## 2020-04-24 DIAGNOSIS — M06.9 RHEUMATOID ARTHRITIS INVOLVING MULTIPLE JOINTS (H): ICD-10-CM

## 2020-04-24 DIAGNOSIS — M25.561 CHRONIC PAIN OF BOTH KNEES: ICD-10-CM

## 2020-04-24 DIAGNOSIS — G89.29 CHRONIC PAIN OF BOTH KNEES: ICD-10-CM

## 2020-04-24 DIAGNOSIS — M79.18 MYOFASCIAL PAIN: ICD-10-CM

## 2020-04-24 DIAGNOSIS — M45.7 ANKYLOSING SPONDYLITIS OF LUMBOSACRAL REGION (H): ICD-10-CM

## 2020-04-24 DIAGNOSIS — M53.3 SACROILIAC JOINT PAIN: ICD-10-CM

## 2020-04-24 DIAGNOSIS — M25.50 MULTIPLE JOINT PAIN: Primary | ICD-10-CM

## 2020-04-24 DIAGNOSIS — M54.59 LUMBAR FACET JOINT PAIN: ICD-10-CM

## 2020-04-24 DIAGNOSIS — M51.369 DDD (DEGENERATIVE DISC DISEASE), LUMBAR: ICD-10-CM

## 2020-04-24 PROCEDURE — 99214 OFFICE O/P EST MOD 30 MIN: CPT | Mod: 95 | Performed by: NURSE PRACTITIONER

## 2020-04-24 RX ORDER — OXYCODONE HYDROCHLORIDE 5 MG/1
5 TABLET ORAL EVERY 6 HOURS PRN
Qty: 40 TABLET | Refills: 0 | Status: SHIPPED | OUTPATIENT
Start: 2020-04-24 | End: 2020-05-14

## 2020-04-24 RX ORDER — OXYCODONE HCL 10 MG/1
10 TABLET, FILM COATED, EXTENDED RELEASE ORAL EVERY 12 HOURS
Qty: 60 TABLET | Refills: 0 | Status: SHIPPED | OUTPATIENT
Start: 2020-04-24 | End: 2020-06-01

## 2020-04-24 NOTE — PROGRESS NOTES
The patient has been notified of following:     This telephone visit will be conducted via a call between you and your provider. We have found that certain health care needs can be provided without the need for a physical exam.  This service lets us provide the care you need with a phone conversation.  If a prescription is necessary we can send it directly to your pharmacy.  If lab work is needed we can place an order for that and you can then stop by our lab to have the test done at a later time. This is a billable service but we do not know the cost at this time.     Patient has given verbal consent for Telephone visit?  Yes    Nicolasa Krueger MA        [facility]    4/24/2020    Jailene Breen is a 54 year old male who is being evaluated via a billable telephone visit.        Chief complaint: lower  back pain. Bilateral knee pain, hand pain, shoulder pain.        Interval history:   Jailene Breen is a 54 year old male is known to me for   Lumbar spondylosis, pain is worse with extension and rotation indicating a facetogenic component to pain  Lumbar degenerative disc disease  SI joint pain  Ankylosing spondylitis  Myofascial pain  Chronic pain syndrome  PMHx includes: Panic attacks, back pain, arthritis, anxiety, ankylosing spondylitis  PSHx includes: Right knee surgery ×2, left foot surgery right knee arthroscopy        Recommendations/plan at the last visit on 4/3/2020 included:  1. Recommended to increase activity while pacing himself  2. Physical Therapy:  He is not currently involved with PHYSICAL THERAPY   3. Clinical Health Psychologist: none  4. Diagnostic Studies:  none  5. Medication Management:    1. Continue OxyContin 10mg BID.   2. Continue Flexeril 5-10mg TID PRN  3. Short-script of oxycodone 5mg tabs max 4/day for flare  6. Further procedures recommended:none  7. Recommendations to PCP: see above  8. Follow up: 8 weeks.        Since his last visit, Jailene Breen  "reports:  -his 28 year old niece overdosed and  last Saturday, she had a 12,5 and 1 year old.   -he is going to her  today  -he did get his Enbrel yesterday  -he is going to see a new rheumatologist in July with the  [facility]   -he says his pain is now a bit better  -he has multiple joint pain from RA and chronic low back pain        At this point, the patient's participation with our multidisciplinary team includes:  The patient has been compliant with the program.    PT - has seen Cyndee White, none recently  Health Psych - worked with  Padilla Keene, last on 2018.  Padilla Keene is no longer with our clinic.       Pain scores:    Pain intensity on average is 9-10 on a scale of 0-10.    Range is 8-10/10.   Pain right now is 9/10. (has been off of his DMARD meds)  Pain is described as \"shooting, throbbing, penetrating, burning, and stabbing.\"  Pain is constant in nature    Current pain relevant medications:      -nortriptyline 50mg at bedtime (helpful)  -Enbrel 50mg/mL   -ibuprofen 800mg TID PRN (using 3 times daily-helpful)  -Tylenol 500mg using 1000mg TID (unsure if helpful)  -Maxalt 10mg PRN migraine (helpful for migraine--he does have visual aura)  -OxyContin 10mg BID (helpful)  -Flexeril 10mg TID PRN (helpful)  -naloxone nasal spray PRN for accidental opiate overdose      Other pertinent medications:   -clonazepam 1mg tab, may take 0.5-1mg BID PRN anxiety (helpful, has been using infrequently)      Previous Medications:   OPIATES: Oxycodone (helpful), Fentanyl (100mcg/hr patch helpful), hydrocodone (itching), Tramadol (not helpful)   NSAIDS: ibuprofen (not helpful), Aleve (not helpful)  MUSCLE RELAXANTS: methocarbamol (somewhat helpful), Flexeril (somewhat helpful), tizanidine (unsure if helpful), metaxalone (unsure), SOMA (helpful)  ANTI-MIGRAINE MEDS: Maxalt (helpful for migraine), Imitrex injection (somewhat helpful)  ANTI-DEPRESSANTS: Prozac (helpful), Cymbalta (felt weird on " it), nortriptyline (unsure if helpful), Buspar (unsure), Remeron (blacked out), bupropion (not helpful for smoking cessation)  SLEEP AIDS: Ambien (helpful)  ANTI-CONVULSANTS: gabapentin (felt odd on this medication, unsure of dose), Lyrica (not helpful for 4 months, unsure of dose), Topamax (not helpful, he felt weird on it)   TOPICALS: Lidocaine patches (not helpful), Voltaren gel (not helpful)  Other meds: Tylenol (unsure if helpful)        Other treatments have included:   Jailene Breen has been seen at a pain clinic in the past.  [facility]  PT: tried, not helpful  Chiropractic: tried, not helpful  Acupuncture: none  TENs Unit: tried, helpful     Injections:     -has had injections at outside clinics  -has had epidural injections for low back pain (one was helpful)  -he has had lumbar medial branch blocks at [facility] and he was going to have lumbar radiofrequency ablation (did not do this due to cost)  -4/3/2017 right LMBBs with Dr. Ashlyn Gamble (helpful)  -4/26/2017 right LMBBs with Dr. Ashlyn Gamble (helpful)  -5/31/2017 right L3, L4, L5 RFA with Dr. Ashlyn Gamble (good relief for over 6 months)  -3/15/2018 right L5 transforaminal MAKAYLA with Dr. Aslhyn Gamble (not helpful)  -5/10/2018 trigger point injections with Dr. Ashlyn Gamble(somewhat helpful).  -7/12/2018 Right L3, L4, L5 RFA with Dr. Ashlyn Gamble (helpful).  -9/20/2018 Left piriformis injections, bilateral gluteal trigger point with Dr. Gamble (helpful).  -1/31/2019 right L2-3, L3-4 and L4-5 facet joint injections with Dr. Ashlyn Gamble (somewhat helpful)  4/4/2019  L3, L4, L5/sacral ala lumbar radiofrequency ablation with Dr. Gamble (helpful over right side)  6/28/2019 Bilateral facet joint injections at l4-5, L5-S1 with Dr. Holley (only helpful for 7 days)            Side Effects: no side effect  Patient is using the medication as prescribed: YES    Medications:  Current Outpatient Medications   Medication Sig Dispense Refill      albuterol (PROAIR HFA/PROVENTIL HFA/VENTOLIN HFA) 108 (90 Base) MCG/ACT inhaler Inhale 2 puffs into the lungs every 6 hours as needed for shortness of breath / dyspnea 1 Inhaler 3     atenolol (TENORMIN) 25 MG tablet Take 25 mg by mouth daily       clonazePAM (KLONOPIN) 1 MG tablet Take 0.5-1 tablets (0.5-1 mg) by mouth daily as needed for anxiety (Patient not taking: Reported on 4/20/2020) 30 tablet 1     cyclobenzaprine (FLEXERIL) 10 MG tablet Take 0.5-1 tablets (5-10 mg) by mouth 3 times daily as needed for muscle spasms Caution sedation 60 tablet 2     etanercept (ENBREL SURECLICK) 50 MG/ML autoinjector Inject 50 mg Subcutaneous once a week 1 kit 2     etanercept (ENBREL) 50 MG/ML injection        folic acid (FOLVITE) 1 MG tablet Reported on 5/19/2017       IBUPROFEN PO Take 800 mg by mouth every 6 hours as needed for moderate pain Reported on 5/19/2017       naloxone (NARCAN) 4 MG/0.1ML nasal spray Spray 1 spray (4 mg) into one nostril alternating nostrils as needed for opioid reversal every 2-3 minutes until assistance arrives (Patient not taking: Reported on 2/12/2020) 0.2 mL 0     nicotine (NICORETTE) 2 MG gum Place 1 each (2 mg) inside cheek as needed for smoking cessation 240 each 1     oxyCODONE (OXYCONTIN) 10 MG 12 hr tablet Take 1 tablet (10 mg) by mouth every 12 hours Fill 4/7/2020. Begin 4/9/2020 60 tablet 0     oxyCODONE (ROXICODONE) 5 MG tablet Take 1 tablet (5 mg) by mouth every 6 hours as needed for severe pain Max 4 tabs/day. Fill/start 4/21/2020. Short-term script 40 tablet 0     predniSONE (DELTASONE) 20 MG tablet Take 1 tablet (20 mg) by mouth daily (Patient not taking: Reported on 4/20/2020) 5 tablet 0     sertraline (ZOLOFT) 100 MG tablet Take 1 tablet (100 mg) by mouth daily 90 tablet 1     sildenafil (REVATIO) 20 MG tablet Take 2-5 tabs one hour before sex (Patient not taking: Reported on 2/12/2020) 30 tablet 3       Medical History: any changes in medical history since they were last  seen? No    Social History:    Home situation: , has 2 children in college  Occupation/Schooling: currently unemployed, worked as a  (unemployed since 3/1/2017). 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  Uses chew  If yes, was cessation counseling offered?  Not today    Review of Systems:    The 14 system ROS was reviewed from the intake questionnaire, and is positive for:  Constitutional: fever/chills, fatigue, weight gain, weight loss  Eyes/Head: headache, dizziness  ENT: ringing in ears  Allergy/Immune: allergies  Skin: itching, rash, hives  Hematologic: easy bruising  Respiratory: cough, wheezing, shortness of breath  Cardiovascular: swelling in feet, fainting, palpitations, chest pain  GI: abdominal pain, nausea, vomiting, diarrhea, constipation  Endocrine: steroid use  Musculoskeletal:  joint pain, arthritis, stiffness, gout, back pain, neck pain  Urinary: frequency, urgency, incontinence, hesitancy  Neurologic: weakness, numbness/tingling, seizure, stroke, memory loss  Mental health: depression, anxiety, stress, suicidal ideation          Physical Exam:     Vital signs: There were no vitals taken for this visit.     Behavioral observations:  Awake, alert, cooperative.           [place] Prescription Monitoring [place]:  Reviewed [place] [facility] 4/24/2020- no concerning fills.  Susana GRANT RN CNP, FNP  [facility]        DIRE Score for selecting candidates for long term opioid analgesia for chronic pain:  Diagnosis  2  Intractablility  2  Risk    Psychological health  2    Chemical health  2    Reliability  2    Social support  2  Efficacy  2    Total DIRE Score = 14. Note that  7-13 predicts poor outcome (compliance and efficacy) from opioid prescribing; 14-21 predicts good outcome (compliance and efficacy)   from opioid prescribing.      Assessment:     1. Multiple joint pain  2. RA involving multiple joints  3. Ankylosing spondylitis of lumbosacral region  4. Lumbar facet joint pain  5. Spondylosis of lumbar region without myelopathy or radiculopathy  6. DDD, lumbar  7. SI joint pain  8. Myofascial pain  9. Chronic pain syndrome  10. Chronic pain of both knees     11. Encounter for long-term opiate analgesic use  12. PMHx includes: Panic attacks, back pain, arthritis, anxiety, ankylosing spondylitis  13. PSHx includes: Right knee surgery ×2, left foot surgery right knee arthroscopy      Plan:    1. Recommended to increase activity while pacing himself  2. Physical Therapy:  He is not currently involved with PHYSICAL THERAPY   3. Clinical Health Psychologist: patient requested referral to psychologist given recent death of niece to overdose and his recent pain flares. Referral placed  4. Diagnostic Studies:  None  5. Medication Management:    1. Continue OxyContin 10mg BID.   2. Continue Flexeril 5-10mg TID PRN  3. Short-script of oxycodone 5mg tabs max 4/day for flare  6. Further procedures recommended:none  7. Recommendations to PCP: see above  8. Follow up: 8 weeks      Phone call duration: 20 minutes        Susana GRANT, SANDRA CNP, FNP  [facility]  [facility] [place] [facility]

## 2020-04-26 ENCOUNTER — MYC MEDICAL ADVICE (OUTPATIENT)
Dept: PALLIATIVE MEDICINE | Facility: CLINIC | Age: 55
End: 2020-04-26

## 2020-04-27 ENCOUNTER — MYC REFILL (OUTPATIENT)
Dept: FAMILY MEDICINE | Facility: CLINIC | Age: 55
End: 2020-04-27

## 2020-04-27 ENCOUNTER — MYC MEDICAL ADVICE (OUTPATIENT)
Dept: PALLIATIVE MEDICINE | Facility: CLINIC | Age: 55
End: 2020-04-27

## 2020-04-27 DIAGNOSIS — R06.02 SOB (SHORTNESS OF BREATH): ICD-10-CM

## 2020-04-27 NOTE — TELEPHONE ENCOUNTER
Writer called pharmacy and gave ok to fill today.    Pt notified     Josias Lopez, RN  Care Coordinator   [facility]

## 2020-04-28 ENCOUNTER — MYC REFILL (OUTPATIENT)
Dept: FAMILY MEDICINE | Facility: CLINIC | Age: 55
End: 2020-04-28

## 2020-04-28 DIAGNOSIS — R06.02 SOB (SHORTNESS OF BREATH): ICD-10-CM

## 2020-04-29 ENCOUNTER — VIRTUAL VISIT (OUTPATIENT)
Dept: PALLIATIVE MEDICINE | Facility: CLINIC | Age: 55
End: 2020-04-29

## 2020-04-29 DIAGNOSIS — M45.7 ANKYLOSING SPONDYLITIS OF LUMBOSACRAL REGION (H): ICD-10-CM

## 2020-04-29 DIAGNOSIS — M06.9 RHEUMATOID ARTHRITIS INVOLVING MULTIPLE JOINTS (H): ICD-10-CM

## 2020-04-29 DIAGNOSIS — M25.50 MULTIPLE JOINT PAIN: Primary | ICD-10-CM

## 2020-04-29 DIAGNOSIS — M79.18 MYOFASCIAL PAIN: ICD-10-CM

## 2020-04-29 DIAGNOSIS — M47.816 SPONDYLOSIS OF LUMBAR REGION WITHOUT MYELOPATHY OR RADICULOPATHY: ICD-10-CM

## 2020-04-29 DIAGNOSIS — M54.59 LUMBAR FACET JOINT PAIN: ICD-10-CM

## 2020-04-29 DIAGNOSIS — M51.369 DDD (DEGENERATIVE DISC DISEASE), LUMBAR: ICD-10-CM

## 2020-04-29 DIAGNOSIS — M53.3 SACROILIAC JOINT PAIN: ICD-10-CM

## 2020-04-29 PROCEDURE — 96156 HLTH BHV ASSMT/REASSESSMENT: CPT | Mod: 95 | Performed by: PSYCHOLOGIST

## 2020-04-29 RX ORDER — ALBUTEROL SULFATE 90 UG/1
2 AEROSOL, METERED RESPIRATORY (INHALATION) EVERY 6 HOURS PRN
Qty: 1 INHALER | Refills: 0 | Status: SHIPPED | OUTPATIENT
Start: 2020-04-29 | End: 2021-02-02
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
## 2020-04-29 NOTE — TELEPHONE ENCOUNTER
Prescription approved per G Refill Protocol.    Ida Hirsch RN, [facility] Triage

## 2020-04-30 RX ORDER — ALBUTEROL SULFATE 90 UG/1
2 AEROSOL, METERED RESPIRATORY (INHALATION) EVERY 6 HOURS PRN
Qty: 1 INHALER | Refills: 3 | Status: CANCELLED | OUTPATIENT
Start: 2020-04-30

## 2020-04-30 NOTE — TELEPHONE ENCOUNTER
albuterol (PROAIR HFA/PROVENTIL HFA/VENTOLIN HFA) 108 (90 Base) MCG/ACT inhaler     Duplicate request. Medication refilled yesterday. See current med list for details.    Geno Guaman RN  [facility]

## 2020-05-04 ENCOUNTER — MYC MEDICAL ADVICE (OUTPATIENT)
Dept: PALLIATIVE MEDICINE | Facility: CLINIC | Age: 55
End: 2020-05-04

## 2020-05-04 ENCOUNTER — TELEPHONE (OUTPATIENT)
Dept: PALLIATIVE MEDICINE | Facility: CLINIC | Age: 55
End: 2020-05-04

## 2020-05-04 NOTE — TELEPHONE ENCOUNTER
Will leave encounter open for patient response with possible issues when picking up.       Chart review: previous PA for Oxycontin  20.   Called pharmacy- they ran Rx through and were able to run script through his insurance.    Tariq below   Lopez Jamison,     I see in your chart that you had a prior authorization for the Oxycontin which  20.  I called the pharmacy and they ran the prescription through your insurance just now to generate a denial to be able to initiate a new prior authorization request.  However, the prescription went through for him without any issue. You will be able to  your medication 20 and start 20 still. Thanks!    Ida MONAE, RN Care Coordinator  [facility]  Pain Management  --------below from patient---------------  Good morning, it sounds like my pre authorization for the oxycontin may have  or soon will in next day or so. Nobody can figure that out right now for sure. The pharmacist thinks it may go through today if you allow him to fill it. I did talk to the insurance company and they are having a problem finding it because it's a new policy. Could you authorize them to fill it to make sure because if I do it will take a week to go through they said so I need to make sure, thanks.      Ida MONAE, RN Care Coordinator  [facility]  Pain Management

## 2020-05-04 NOTE — TELEPHONE ENCOUNTER
Prior Authorization Retail Medication Request    Medication/Dose: oxyCODONE (OXYCONTIN) 10 MG 12 hr tablet  ICD code (if different than what is on RX):    Previously Tried and Failed:    Rationale:  Renewal- previously approved for 10/16/2019 to 4/16/2020    KEY: [id number]    Pharmacy Information    [address] [address]  Phone: [phone] Fax: [phone]

## 2020-05-04 NOTE — TELEPHONE ENCOUNTER
[facility] calling to state that the PA was approved, but not to start until 6/1/2020. Any pharmacist can be reached at [phone].    Elana MURO    [facility]

## 2020-05-04 NOTE — TELEPHONE ENCOUNTER
PA Initiation    Medication: oxyCODONE (OXYCONTIN) 10 MG 12 hr tablet  Insurance Company: [employer] [place] - Phone [phone] Fax [phone]  Pharmacy Filling the Rx: [id number] - [place] - [address]  Filling Pharmacy Phone: [phone]  Filling Pharmacy Fax: [phone]  Start Date: 5/4/2020

## 2020-05-04 NOTE — TELEPHONE ENCOUNTER
Prior Authorization Approval    Authorization Effective Date: 6/1/2020  Authorization Expiration Date: 12/1/2020  Medication: oxyCODONE (OXYCONTIN) 10 MG 12 hr tablet--APPROVED  Approved Dose/Quantity:   Reference #:     Insurance Company: [employer] [place] - Phone [phone] Fax [phone]  Expected CoPay:       CoPay Card Available:      Foundation Assistance Needed:    Which Pharmacy is filling the prescription (Not needed for infusion/clinic administered): [id number] - [place], [place] - [address]  Pharmacy Notified:  Yes  Patient Notified:  Yes **Instructed pharmacy to notify patient when script is ready to /ship.**

## 2020-05-05 ENCOUNTER — MYC REFILL (OUTPATIENT)
Dept: FAMILY MEDICINE | Facility: CLINIC | Age: 55
End: 2020-05-05

## 2020-05-05 ENCOUNTER — MYC MEDICAL ADVICE (OUTPATIENT)
Dept: PALLIATIVE MEDICINE | Facility: CLINIC | Age: 55
End: 2020-05-05

## 2020-05-05 DIAGNOSIS — F17.200 SMOKING ADDICTION: ICD-10-CM

## 2020-05-05 NOTE — TELEPHONE ENCOUNTER
Further clarification was obtained through patient's pharmacy and insurance. Pt will be able to  his next supply this week, 5/5-5/8, and will not have to wait until 6/1 as listed in the letter within the PA approval.     KERLINE ParkerN, RN-BC  Patient Care Supervisor  [facility] [place]

## 2020-05-05 NOTE — TELEPHONE ENCOUNTER
Pt sent another [facility] [employer] message today with the same request as he called about.     Will close this encounter and manage response through the new one.     KERLINE ParkerN, RN-BC  Patient Care Supervisor  [facility] [place]

## 2020-05-05 NOTE — TELEPHONE ENCOUNTER
Tariq message from patient on 5/5 at 9:07:    Ok, so I was on the phone with [employer] and [employer] trying to fix the ore authorization date and they believe they have. But wanted me to  today to make sure everything goes smoothly since there has been a few issues. Can you please authorize the pharmacy to fill today do I can make sure no more problems arise with this stuff, it's been a headache.  ---------------  Message sent to pt:    Lopez Swift,     It sure sounds like all have done their due diligence with getting everything taken care of for the PA so you don't have to be delayed with receiving your medication! I will have Susana Pike CNP review your request to  your medication today rather than on 5/7. We will let you know what she says. Whatever the case, you are not due to start the new supply until Saturday 5/9/20.     Take care,     LETHA Parker, RN-BC  Patient Care Supervisor  [facility]

## 2020-05-05 NOTE — TELEPHONE ENCOUNTER
Writer called pharmacy and changed to 5/5/2020.    Josias Lopez, RN  Care Coordinator   [facility] [place]

## 2020-05-05 NOTE — TELEPHONE ENCOUNTER
Pt calling regarding oxycontin medication. Pt states this whole PA has been really screwed up and would like to have an early refill in case more goes wrong. Pt would like a call back.    Elana MURO    [facility]

## 2020-05-05 NOTE — TELEPHONE ENCOUNTER
Tariq message from patient on 5/5 at 0948:    Agree, it's not about starting early, just making sure everything floes good. Because yesterday the pharmacy said it went through then it got rejected., Thanks.  ------    Will await Susana Pike, CNP response re: dispense date of 5/5 vs 5/7 to allow for time if issues with PA approval even though it appears to have been taken care of.     Chrissie Milan, KERLINEN, RN-BC  Patient Care Supervisor  [facility] [place]

## 2020-05-06 ENCOUNTER — VIRTUAL VISIT (OUTPATIENT)
Dept: PALLIATIVE MEDICINE | Facility: CLINIC | Age: 55
End: 2020-05-06

## 2020-05-06 DIAGNOSIS — M53.3 SACROILIAC JOINT PAIN: ICD-10-CM

## 2020-05-06 DIAGNOSIS — M54.59 LUMBAR FACET JOINT PAIN: ICD-10-CM

## 2020-05-06 DIAGNOSIS — M51.369 DDD (DEGENERATIVE DISC DISEASE), LUMBAR: ICD-10-CM

## 2020-05-06 DIAGNOSIS — M79.18 MYOFASCIAL PAIN: ICD-10-CM

## 2020-05-06 DIAGNOSIS — M47.816 SPONDYLOSIS OF LUMBAR REGION WITHOUT MYELOPATHY OR RADICULOPATHY: ICD-10-CM

## 2020-05-06 DIAGNOSIS — M06.9 RHEUMATOID ARTHRITIS INVOLVING MULTIPLE JOINTS (H): ICD-10-CM

## 2020-05-06 DIAGNOSIS — M25.50 MULTIPLE JOINT PAIN: Primary | ICD-10-CM

## 2020-05-06 DIAGNOSIS — M45.7 ANKYLOSING SPONDYLITIS OF LUMBOSACRAL REGION (H): ICD-10-CM

## 2020-05-06 PROCEDURE — 96158 HLTH BHV IVNTJ INDIV 1ST 30: CPT | Mod: 95 | Performed by: PSYCHOLOGIST

## 2020-05-06 PROCEDURE — 96159 HLTH BHV IVNTJ INDIV EA ADDL: CPT | Mod: 95 | Performed by: PSYCHOLOGIST

## 2020-05-06 NOTE — PROGRESS NOTES
"Jailene Breen is a 54 year old male who is being evaluated via a billable telephone visit.      The patient has been notified of following:     \"This telephone visit will be conducted via a call between you and Santa Farrell PsyD LP. We have found that certain health care needs can be provided without the need for an in-person session.  This service lets us provide the care you need with a phone conversation.       If during the course of the call I feel a telephone visit is not appropriate, you will not be charged for this service.\"      Reviewed that patient is in a quiet private place, no recording without permission, all apps and notifications of any devices are turned off. Began the session by talking about the risks and benefits of telehealth, what to do if there is a break in the connection, reviewed the safety protocol for during and after sessions. The purpose of using telehealth for this session was due to the COVID-19 pandemic and was to reduce the spread of the disease and protect both the clinician and client.  Due to COVID-19, clinician's location was in a private space with a closed door in clincian's home; patient was in a private space in their home.            Patient has given verbal consent for telephone visit? YES    Phone call contact time  Call Started at 11:00 AM  Call Ended at 11:59 AM    Total 59 minutes     Pain Diagnoses per pain provider:   Multiple joint pain      Rheumatoid arthritis involving multiple joints (H)      Ankylosing spondylitis of lumbosacral region (H)      Lumbar facet joint pain      Spondylosis of lumbar region without myelopathy or radiculopathy      DDD (degenerative disc disease), lumbar      Sacroiliac joint pain      Myofascial pain           DATA: Patient reports his pain is greatly worse and isn't sure why. Patient's mood is 'down in the dumps' and isn't sure why. Activity level is about the same. Stress level is quite high - worries about classes and " falling behind on online coursework due to Covid-19, finances, feeling he is not 'productive' member of his family in terms of providing an income. Sleep hygiene continues to be poor - struggles to find a comfortable position. Patient reports engaging in self-care for his pain 2-3 times per day. Continue to report struggling with his mood and motivation. Struggling with not having direct interaction in classes, recognizes this may be contributing to his low mood. Discussed window of tolerance to help him understand how his pain and mood interact. Explored concept of radical acceptance. Encouraged him to discuss options to manage his coursework during his weekly check ins with his school advisor.     ASSESSMENT: Patient's negative self talk tends to get in the way of some of the benefit of self-care for his pain, and has a negative impact on his mood which also impacts his pain significantly.  Progress toward goals: as expected.    Pain Status: worsened    Emotional Status: worsened              Medication / chemical use concerns:  None    PLAN:   Next Appointment: Jailenestephenie Breen's next appointment is scheduled for 5/11 8:00 a.m.  Assignment/Objectives /interventions for next session: Focus on radical acceptance in order to focus on solutions, challenge negative self-talk. Focus on breaking tasks down into smaller chunks of time in order to lessen sense of feeling overwhelmed.    I have reviewed the note as documented above.  This accurately captures the substance of my conversation with the patient.    Santa Farrell PsyD LP  Licensed Psychologist  Outpatient Clinic Therapist  [facility]    Disclaimer: This note consists of symbols derived from keyboarding, dictation and/or voice recognition software. As a result, there may be errors in the script that have gone undetected. Please consider this when interpreting information found in this chart.

## 2020-05-11 ENCOUNTER — VIRTUAL VISIT (OUTPATIENT)
Dept: PALLIATIVE MEDICINE | Facility: CLINIC | Age: 55
End: 2020-05-11

## 2020-05-11 DIAGNOSIS — M25.50 MULTIPLE JOINT PAIN: Primary | ICD-10-CM

## 2020-05-11 DIAGNOSIS — M51.369 DDD (DEGENERATIVE DISC DISEASE), LUMBAR: ICD-10-CM

## 2020-05-11 DIAGNOSIS — M47.816 SPONDYLOSIS OF LUMBAR REGION WITHOUT MYELOPATHY OR RADICULOPATHY: ICD-10-CM

## 2020-05-11 DIAGNOSIS — M79.18 MYOFASCIAL PAIN: ICD-10-CM

## 2020-05-11 DIAGNOSIS — M06.9 RHEUMATOID ARTHRITIS INVOLVING MULTIPLE JOINTS (H): ICD-10-CM

## 2020-05-11 DIAGNOSIS — M54.59 LUMBAR FACET JOINT PAIN: ICD-10-CM

## 2020-05-11 DIAGNOSIS — M53.3 SACROILIAC JOINT PAIN: ICD-10-CM

## 2020-05-11 DIAGNOSIS — M45.7 ANKYLOSING SPONDYLITIS OF LUMBOSACRAL REGION (H): ICD-10-CM

## 2020-05-11 PROCEDURE — 96158 HLTH BHV IVNTJ INDIV 1ST 30: CPT | Mod: 95 | Performed by: PSYCHOLOGIST

## 2020-05-11 PROCEDURE — 96159 HLTH BHV IVNTJ INDIV EA ADDL: CPT | Mod: 95 | Performed by: PSYCHOLOGIST

## 2020-05-11 NOTE — PROGRESS NOTES
"Jailene Breen is a 54 year old male who is being evaluated via a billable telephone visit.      The patient has been notified of following:     \"This telephone visit will be conducted via a call between you and Santa Farrell PsyD LP. We have found that certain health care needs can be provided without the need for an in-person session.  This service lets us provide the care you need with a phone conversation.       If during the course of the call I feel a telephone visit is not appropriate, you will not be charged for this service.\"      Reviewed that patient is in a quiet private place, no recording without permission, all apps and notifications of any devices are turned off. Began the session by talking about the risks and benefits of telehealth, what to do if there is a break in the connection, reviewed the safety protocol for during and after sessions. The purpose of using telehealth for this session was due to the COVID-19 pandemic and was to reduce the spread of the disease and protect both the clinician and client.             Patient has given verbal consent for telephone visit? YES    Phone call contact time  Call Started at 8:00 am  Call Ended at 8:53 am  Total 53 minutes    Pain Diagnoses per pain provider:    Multiple joint pain       Rheumatoid arthritis involving multiple joints (H)       Ankylosing spondylitis of lumbosacral region (H)       Lumbar facet joint pain       Spondylosis of lumbar region without myelopathy or radiculopathy       DDD (degenerative disc disease), lumbar       Sacroiliac joint pain       Myofascial pain         DATA: Patient reports his pain is greatly worse. He has been taking walks - finds this helps with his anxiety and sensation of feeling cooped up and trapped, but not necessarily his pain. Patient's mood is moderately worse - feeling very anxious over the weekend due to decision regarding his coursework. He is feeling overwhelmed by the coursework, and has not " been staying up to date on his coursework. Activity level is mildly increased. Stress level is moderately high due to worry about his coursework. Sleep hygiene is variable - last night he did not sleep well at all, finds his anxiety is primary trigger. Patient reports engaging in self-care for his pain 2-3 times per day. Discussed some strategies to manage his anxiety. Discussed coping ahead, TIP skill and radical acceptance.     ASSESSMENT: Easily engaged and readily utilizes skills discussed in sessions.  Progress toward goals: good.    Pain Status: worsened    Emotional Status: worsened              Medication / chemical use concerns:  None    PLAN:   Next Appointment: Jailene Breen's next appointment is scheduled for 5/19 8:00 am.  Assignment/Objectives /interventions for next session: Use TIP skill, coping ahead and radical acceptance when anxiety is high. Check in more frequently while on walks to pay attention to body cues regarding pain.    Telephone-Visit Details    Type of service:  Telephone Visit    Originating Location (pt. Location): Home    Distant Location (provider location):  [facility]     Mode of Communication:  Telephone    I have reviewed the note as documented above.  This accurately captures the substance of my conversation with the patient.    Santa Farrell PsyD LP  Licensed Psychologist  Outpatient Clinic Therapist  [facility]    Disclaimer: This note consists of symbols derived from keyboarding, dictation and/or voice recognition software. As a result, there may be errors in the script that have gone undetected. Please consider this when interpreting information found in this chart.

## 2020-05-12 ENCOUNTER — MYC MEDICAL ADVICE (OUTPATIENT)
Dept: PALLIATIVE MEDICINE | Facility: CLINIC | Age: 55
End: 2020-05-12

## 2020-05-12 ENCOUNTER — MYC REFILL (OUTPATIENT)
Dept: FAMILY MEDICINE | Facility: CLINIC | Age: 55
End: 2020-05-12

## 2020-05-12 ENCOUNTER — TELEPHONE (OUTPATIENT)
Dept: PALLIATIVE MEDICINE | Facility: CLINIC | Age: 55
End: 2020-05-12

## 2020-05-12 DIAGNOSIS — F17.200 SMOKING ADDICTION: ICD-10-CM

## 2020-05-12 NOTE — TELEPHONE ENCOUNTER
Prior Authorization Specialty Medication Request    Medication/Dose: oxyCODONE (OXYCONTIN) 10 MG 12 hr tablet  ICD code (if different than what is on RX):    Lumbar facet joint pain [M54.5]        DDD (degenerative disc disease), lumbar [M51.36]        Sacroiliac joint pain [M53.3]        Ankylosing spondylitis of lumbosacral region (H) [M45.7]        Myofascial pain [M79.18]        Chronic pain syndrome [G89.4]        Chronic pain of both knees [M25.561, M25.562, G89.29]        Spondylosis of lumbar region without myelopathy or radiculopathy [M47.816]           Previously Tried and Failed:  ...    Important Lab Values: ...  Rationale: ...    Insurance Name:   Coverage information:     Subscriber: [id number] KIA SHULTZ     Rel to sub: 02 - Spouse     Member ID: [id number]     Payor: [employer] Ph: [phone]     Benefit plan: [id number]     Group number: [id number]     Member effective dates: from 04/01/16               Pharmacy Information (if different than what is on RX)  Name:  [facility]  Phone:  [phone]  Fax: [phone]

## 2020-05-13 NOTE — TELEPHONE ENCOUNTER
Devante Swift-     The Governor has approved elective surgeries and procedures, however, [facility] is still working out details as to how to accomplish this in a safe manner for all of our patients. At this time, we have not been cleared to go forward with any injections at this time.      Please reach out again in 2 weeks for a status update. We should know more by then. There is a lot of work going on to get this figured out safely for all.     Susana GRANT RN CNP, JAQUELINP  [facility]  [facility]    I have sent the above [employer] message to the patient.     Susana GRANT RN CNP, JAQUELINP  [facility]

## 2020-05-13 NOTE — TELEPHONE ENCOUNTER
Will forward to Susana to review and advise.    Josias Lopez, RN  Care Coordinator   [facility] [place]

## 2020-05-14 ENCOUNTER — MYC MEDICAL ADVICE (OUTPATIENT)
Dept: PALLIATIVE MEDICINE | Facility: CLINIC | Age: 55
End: 2020-05-14

## 2020-05-14 ENCOUNTER — MYC REFILL (OUTPATIENT)
Dept: FAMILY MEDICINE | Facility: CLINIC | Age: 55
End: 2020-05-14

## 2020-05-14 ENCOUNTER — MYC REFILL (OUTPATIENT)
Dept: PALLIATIVE MEDICINE | Facility: CLINIC | Age: 55
End: 2020-05-14

## 2020-05-14 DIAGNOSIS — M06.9 RHEUMATOID ARTHRITIS INVOLVING MULTIPLE JOINTS (H): ICD-10-CM

## 2020-05-14 DIAGNOSIS — G89.4 CHRONIC PAIN SYNDROME: ICD-10-CM

## 2020-05-14 DIAGNOSIS — F17.200 SMOKING ADDICTION: ICD-10-CM

## 2020-05-14 RX ORDER — OXYCODONE HYDROCHLORIDE 5 MG/1
5 TABLET ORAL EVERY 6 HOURS PRN
Qty: 40 TABLET | Refills: 0 | Status: CANCELLED | OUTPATIENT
Start: 2020-05-14

## 2020-05-14 NOTE — TELEPHONE ENCOUNTER
Patient Comment: Could I please have one more refill as the pain is still very intense.     In a message from pt via a separate encounter, pt reports being out of oxycodone.     Patient requesting refill(s) of oxyCODONE (ROXICODONE) 5 MG tablet   Last dispensed from pharmacy on 4/27/2020    Pt last seen by prescribing provider on 4/24/2020  Next appt scheduled for 6/10/2020     checked in the past 6 months? Yes If no, print current report and give to RN    Last urine drug screen date 1/21/2020  Current opioid agreement on file (completed within the last year) Yes Date of opioid agreement:1/21/2020    Processing (pick one and delete the others):      E-prescribe to [id number]    Will route to nursing pool for review and preparation of prescription(s).

## 2020-05-14 NOTE — TELEPHONE ENCOUNTER
Medication refill information reviewed.     Due date:  Anytime. Pt is not on oxycodone regularly    Weaning instructions:  N/A    Prescriptions prepped for review.   Patient Comment: Could I please have one more refill as the pain is still very intense.      Cha Molina RN-BSN  [facility]

## 2020-05-14 NOTE — TELEPHONE ENCOUNTER
Tariq message from patient on 5/14 at 0722:      Jailenestephenie Breen would like a refill of the following medications:         oxyCODONE (ROXICODONE) 5 MG tablet [Susana Pike, JUANITA CNP]       Patient Comment: Could I please have one more refill as the pain is still very intense.     Preferred pharmacy: [id number] [address]   --------------------  In a message from pt via a separate encounter, pt reports being out of oxycodone.     Will route to MA pool for assistance with gathering opioid refill information.    KERLINE ParkerN, RN-BC  Patient Care Supervisor/Care Coordinator  [facility] [place]

## 2020-05-14 NOTE — TELEPHONE ENCOUNTER
[email or web address] message from patient on 5/13 at 1953:    Ok, maybe its the extra stress from the stuff happening in our world but my pain has just been crazy high. Unfortunately I'm out if the oxycodone which has added extra relief but please set me up for that procedure once you can.  ----  Of note, pt sent a [email or web address] Refill request for another refill of oxycodone. Please see that call for follow up.     Will route to Susana Pike CNP as FYI.     Chrissie Milan, KERLINEN, RN-BC  Patient Care Supervisor  [facility] [place]

## 2020-05-14 NOTE — TELEPHONE ENCOUNTER
Per patient myChart message:  From: Jamison Breen      Created: 5/14/2020 3:45 PM      Hi, could you please refill the oxycodone and send to [facility] [place].  I did do the other request but I'm having a real tough time with the pain today.

## 2020-05-15 RX ORDER — OXYCODONE HYDROCHLORIDE 5 MG/1
5 TABLET ORAL EVERY 6 HOURS PRN
Qty: 40 TABLET | Refills: 0 | Status: SHIPPED | OUTPATIENT
Start: 2020-05-15 | End: 2020-06-01

## 2020-05-15 NOTE — TELEPHONE ENCOUNTER
Prescription approved per [facility] Refill Protocol.      Chantel Staley RN, BSN, PHN

## 2020-05-15 NOTE — TELEPHONE ENCOUNTER
Signed Prescriptions:                        Disp   Refills    oxyCODONE (ROXICODONE) 5 MG tablet         40 tab*0        Sig: Take 1 tablet (5 mg) by mouth every 6 hours as needed           for severe pain Max 4 tabs/day. Fill/start           5/15/2020.  Short-term script  Authorizing Provider: SUSANA PETTY    Reviewed [facility] May 15, 2020- no concerning fills.    Susana Petty APRN, RN CNP, FNP  [facility]  [facility]

## 2020-05-17 DIAGNOSIS — F17.200 SMOKING ADDICTION: ICD-10-CM

## 2020-05-19 ENCOUNTER — VIRTUAL VISIT (OUTPATIENT)
Dept: PALLIATIVE MEDICINE | Facility: CLINIC | Age: 55
End: 2020-05-19
Payer: COMMERCIAL

## 2020-05-19 DIAGNOSIS — M47.816 SPONDYLOSIS OF LUMBAR REGION WITHOUT MYELOPATHY OR RADICULOPATHY: ICD-10-CM

## 2020-05-19 DIAGNOSIS — M06.9 RHEUMATOID ARTHRITIS INVOLVING MULTIPLE JOINTS (H): Primary | ICD-10-CM

## 2020-05-19 DIAGNOSIS — M51.369 DDD (DEGENERATIVE DISC DISEASE), LUMBAR: ICD-10-CM

## 2020-05-19 DIAGNOSIS — M53.3 SACROILIAC JOINT PAIN: ICD-10-CM

## 2020-05-19 DIAGNOSIS — M25.50 MULTIPLE JOINT PAIN: ICD-10-CM

## 2020-05-19 DIAGNOSIS — M54.59 LUMBAR FACET JOINT PAIN: ICD-10-CM

## 2020-05-19 DIAGNOSIS — M45.7 ANKYLOSING SPONDYLITIS OF LUMBOSACRAL REGION (H): ICD-10-CM

## 2020-05-19 DIAGNOSIS — M79.18 MYOFASCIAL PAIN: ICD-10-CM

## 2020-05-19 PROCEDURE — 96159 HLTH BHV IVNTJ INDIV EA ADDL: CPT | Mod: 95 | Performed by: PSYCHOLOGIST

## 2020-05-19 PROCEDURE — 96158 HLTH BHV IVNTJ INDIV 1ST 30: CPT | Mod: 95 | Performed by: PSYCHOLOGIST

## 2020-05-19 NOTE — PROGRESS NOTES
"Jailene Breen is a 54 year old male who is being evaluated via a billable telephone visit.      The patient has been notified of following:     \"This telephone visit will be conducted via a call between you and Santa Farrell PsyD LP. We have found that certain health care needs can be provided without the need for an in-person session.  This service lets us provide the care you need with a phone conversation.       If during the course of the call I feel a telephone visit is not appropriate, you will not be charged for this service.\"      Reviewed that patient is in a quiet private place, no recording without permission, all apps and notifications of any devices are turned off. Began the session by talking about the risks and benefits of telehealth, what to do if there is a break in the connection, reviewed the safety protocol for during and after sessions. The purpose of using telehealth for this session was due to the COVID-19 pandemic and was to reduce the spread of the disease and protect both the clinician and client.             Patient has given verbal consent for telephone visit? YES    Phone call contact time  Call Started at 8:07 a.m. - patient did not answer first attempt at 8:00 a.m.  Call Ended at 8:51 a.m.  Total 44 minutes     Pain Diagnoses per pain provider:   Multiple joint pain      Rheumatoid arthritis involving multiple joints (H)      Ankylosing spondylitis of lumbosacral region (H)      Lumbar facet joint pain      Sacroiliac joint pain      Myofascial pain      Spondylosis of lumbar region without myelopathy or radiculopathy      DDD (degenerative disc disease), lumbar         DATA: Patient reports his pain is 'still pretty intense' - attributes this to a lack of moving around. Has reached out to Susana Pike regarding this and has been informed he is on the list for procedures as soon as it is safe to do so. Discussed staying on top of his stress with use of TIP skill, radical " acceptance and coping ahead. Patient's mood is a little bit better as he's been able to figure out a plan for school. Activity level is about the same. Stress level is a little better since figuring out classes. Sleep hygiene is mildly improved. Patient reports engaging in self-care for his pain 2-3 times per day. Made decision to transition his classes to pass/fail, feels this is the appropriate decision for him as he was feeling overwhelmed with homework. Reviewed TIP skill again - he finds temperature and movement to be very effective for his anxiety.    ASSESSMENT: Easily engaged and readily utilizes skills discussed in sessions. Seems to have good awareness of how his stress negatively impacts his pain by increasing his anxiety.    Progress toward goals: good.    Pain Status: worsened    Emotional Status: improved              Medication / chemical use concerns:  None    PLAN:   Next Appointment: Jailene Breen's next appointment is scheduled for 6/1 at 8 a.m.  Assignment/Objectives /interventions for next session: Continue to practice TIP skills and coping ahead, as well as radical acceptance.    Telephone-Visit Details    Type of service:  Telephone Visit    Originating Location (pt. Location): [place]    Distant Location (provider location):  [facility]     Mode of Communication:  Telephone    I have reviewed the note as documented above.  This accurately captures the substance of my conversation with the patient.    Santa Farrell PsyD LP  Licensed Psychologist  Outpatient Clinic Therapist  [facility]    Disclaimer: This note consists of symbols derived from keyboarding, dictation and/or voice recognition software. As a result, there may be errors in the script that have gone undetected. Please consider this when interpreting information found in this chart.

## 2020-05-20 NOTE — TELEPHONE ENCOUNTER
Routing refill request to provider for review/approval because:  Fails BP parameters.    Ida Hirsch RN, [facility] Triage

## 2020-05-31 ENCOUNTER — MYC MEDICAL ADVICE (OUTPATIENT)
Dept: PALLIATIVE MEDICINE | Facility: CLINIC | Age: 55
End: 2020-05-31

## 2020-05-31 DIAGNOSIS — M54.59 LUMBAR FACET JOINT PAIN: ICD-10-CM

## 2020-05-31 DIAGNOSIS — M51.369 DDD (DEGENERATIVE DISC DISEASE), LUMBAR: ICD-10-CM

## 2020-05-31 DIAGNOSIS — M45.7 ANKYLOSING SPONDYLITIS OF LUMBOSACRAL REGION (H): ICD-10-CM

## 2020-05-31 DIAGNOSIS — M47.816 SPONDYLOSIS OF LUMBAR REGION WITHOUT MYELOPATHY OR RADICULOPATHY: ICD-10-CM

## 2020-05-31 DIAGNOSIS — G89.29 CHRONIC PAIN OF BOTH KNEES: ICD-10-CM

## 2020-05-31 DIAGNOSIS — M25.562 CHRONIC PAIN OF BOTH KNEES: ICD-10-CM

## 2020-05-31 DIAGNOSIS — M06.9 RHEUMATOID ARTHRITIS INVOLVING MULTIPLE JOINTS (H): ICD-10-CM

## 2020-05-31 DIAGNOSIS — M79.18 MYOFASCIAL PAIN: ICD-10-CM

## 2020-05-31 DIAGNOSIS — G89.4 CHRONIC PAIN SYNDROME: ICD-10-CM

## 2020-05-31 DIAGNOSIS — M53.3 SACROILIAC JOINT PAIN: ICD-10-CM

## 2020-05-31 DIAGNOSIS — M25.561 CHRONIC PAIN OF BOTH KNEES: ICD-10-CM

## 2020-06-01 ENCOUNTER — VIRTUAL VISIT (OUTPATIENT)
Dept: PALLIATIVE MEDICINE | Facility: CLINIC | Age: 55
End: 2020-06-01
Payer: COMMERCIAL

## 2020-06-01 DIAGNOSIS — M45.7 ANKYLOSING SPONDYLITIS OF LUMBOSACRAL REGION (H): ICD-10-CM

## 2020-06-01 DIAGNOSIS — M25.50 MULTIPLE JOINT PAIN: Primary | ICD-10-CM

## 2020-06-01 DIAGNOSIS — M53.3 SACROILIAC JOINT PAIN: ICD-10-CM

## 2020-06-01 DIAGNOSIS — M06.9 RHEUMATOID ARTHRITIS INVOLVING MULTIPLE JOINTS (H): ICD-10-CM

## 2020-06-01 DIAGNOSIS — M79.18 MYOFASCIAL PAIN: ICD-10-CM

## 2020-06-01 DIAGNOSIS — M51.369 DDD (DEGENERATIVE DISC DISEASE), LUMBAR: ICD-10-CM

## 2020-06-01 DIAGNOSIS — M47.816 SPONDYLOSIS OF LUMBAR REGION WITHOUT MYELOPATHY OR RADICULOPATHY: ICD-10-CM

## 2020-06-01 DIAGNOSIS — M54.59 LUMBAR FACET JOINT PAIN: ICD-10-CM

## 2020-06-01 PROCEDURE — 96158 HLTH BHV IVNTJ INDIV 1ST 30: CPT | Mod: 95 | Performed by: PSYCHOLOGIST

## 2020-06-01 PROCEDURE — 96159 HLTH BHV IVNTJ INDIV EA ADDL: CPT | Mod: 95 | Performed by: PSYCHOLOGIST

## 2020-06-01 RX ORDER — OXYCODONE HYDROCHLORIDE 5 MG/1
5 TABLET ORAL EVERY 6 HOURS PRN
Qty: 40 TABLET | Refills: 0 | Status: SHIPPED | OUTPATIENT
Start: 2020-06-01 | End: 2020-06-16

## 2020-06-01 RX ORDER — OXYCODONE HCL 10 MG/1
10 TABLET, FILM COATED, EXTENDED RELEASE ORAL EVERY 12 HOURS
Qty: 60 TABLET | Refills: 0 | Status: SHIPPED | OUTPATIENT
Start: 2020-06-01 | End: 2020-06-29

## 2020-06-01 NOTE — TELEPHONE ENCOUNTER
Patient requesting refill(s) of oxyCODONE (ROXICODONE) 5 MG tablet   Last dispensed from pharmacy on 5/15/2020    oxyCODONE (OXYCONTIN) 10 MG 12 hr tablet   Last dispensed from pharmacy on 5/5/2020    Pt last seen by prescribing provider on 4/24/2020  Next appt scheduled for 6/10/2020     checked in the past 6 months? Yes If no, print current report and give to RN    Last urine drug screen date 1/21/2020  Current opioid agreement on file (completed within the last year) Yes Date of opioid agreement: 01/21/2020    Processing (pick one and delete the others):      E-prescribe to [id number]    Will route to nursing pool for review and preparation of prescription(s).

## 2020-06-01 NOTE — TELEPHONE ENCOUNTER
Medication refill information reviewed.     Last due:    Oxycontin:  Fill 5/7/2020. Begin 5/9/2020   Oxycodone:  Fill/start 5/15/2020.  Short-term script (this was a 10 day script at 4 tabs/day)    Due date:    Oxycontin:  6/8/2020  Oxycodone: anytime Was given a short term script. Unsure if JUANITA Ramon CNP will refill again.    Weaning instructions:  N/A    Prescriptions prepped for review.     Cha Molina RN-BSN  [facility]

## 2020-06-01 NOTE — TELEPHONE ENCOUNTER
Signed Prescriptions:                        Disp   Refills    oxyCODONE (ROXICODONE) 5 MG tablet         40 tab*0        Sig: Take 1 tablet (5 mg) by mouth every 6 hours as needed           for severe pain Max of 4 tabs/day.  Short-term           script, fill/begin 6/1/2020  Authorizing Provider: SUSANA PETTY    oxyCODONE (OXYCONTIN) 10 MG 12 hr tablet   60 tab*0        Sig: Take 1 tablet (10 mg) by mouth every 12 hours Fill           6/6/2020. Begin 6/8/2020  Authorizing Provider: SUSANA PETTY    Reviewed [place]  June 1, 2020- no concerning fills.    Susana GRANT, RN CNP, FNP  [facility]  [facility]

## 2020-06-01 NOTE — TELEPHONE ENCOUNTER
Routed to the nursing pool and to the MA pool to process refill(s).    Cha Molina, RN-BSN  [facility]

## 2020-06-01 NOTE — PROGRESS NOTES
"Jailene Breen is a 54 year old male who is being evaluated via a billable telephone visit.      The patient has been notified of following:     \"This telephone visit will be conducted via a call between you and Santa Farrell PsyD LP. We have found that certain health care needs can be provided without the need for an in-person session.  This service lets us provide the care you need with a phone conversation.       If during the course of the call I feel a telephone visit is not appropriate, you will not be charged for this service.\"      Reviewed that patient is in a quiet private place, no recording without permission, all apps and notifications of any devices are turned off. Began the session by talking about the risks and benefits of telehealth, what to do if there is a break in the connection, reviewed the safety protocol for during and after sessions. The purpose of using telehealth for this session was due to the COVID-19 pandemic and was to reduce the spread of the disease and protect both the clinician and client.             Patient has given verbal consent for telephone visit? YES    Phone call contact time  Call Started at 8:00 AM  Call Ended at 8:48 AM  Total 48 minutes     Pain Diagnoses per pain provider:   Multiple joint pain       Rheumatoid arthritis involving multiple joints (H)       Ankylosing spondylitis of lumbosacral region (H)       Lumbar facet joint pain       Sacroiliac joint pain       Myofascial pain       Spondylosis of lumbar region without myelopathy or radiculopathy       DDD (degenerative disc disease), lumbar         DATA: Patient reports his pain is 'extremely bad.' Patient's mood is moderately worse - some increased stress has impacted his mood negatively and he feels somewhat overwhelmed by his pain. Activity level is greatly reduced - has not been taking dogs out as often due to increased pain. Stress level is quite high - financial stress and subsequently filing for " bankruptcy, SSDI process. Sleep hygiene is greatly worse due to pain. Patient reports engaging in self-care for his pain 2-3 times per day. States he ran out of his short-term oxycodone a few days ago. Discussed the importance of moving for about 5 minutes at a time or longer if able to manage his pain and anxiety.    ASSESSMENT: Patient's negative self talk tends to get in the way of some of the benefit of self-care for his pain, and has a negative impact on his mood which also impacts his pain significantly.  Progress toward goals: fair.    Pain Status: worsened    Emotional Status: worsened              Medication / chemical use concerns:  None    PLAN:   Next Appointment: Jailene Breen's next appointment is scheduled for 6/8 at 8:00 am.  Assignment/Objectives /interventions for next session: Increased gentle movement for about 5 minutes daily a few times per day.    Telephone-Visit Details    Type of service:  Telephone Visit    Originating Location (pt. Location): Home    Distant Location (provider location):  [place]     Mode of Communication:  Telephone    I have reviewed the note as documented above.  This accurately captures the substance of my conversation with the patient.    Santa Farrell PsyD LP  Licensed Psychologist  Outpatient Clinic Therapist  [facility]    Disclaimer: This note consists of symbols derived from keyboarding, dictation and/or voice recognition software. As a result, there may be errors in the script that have gone undetected. Please consider this when interpreting information found in this chart.

## 2020-06-06 DIAGNOSIS — F17.200 SMOKING ADDICTION: ICD-10-CM

## 2020-06-08 ENCOUNTER — VIRTUAL VISIT (OUTPATIENT)
Dept: PALLIATIVE MEDICINE | Facility: CLINIC | Age: 55
End: 2020-06-08
Payer: COMMERCIAL

## 2020-06-08 DIAGNOSIS — M54.59 LUMBAR FACET JOINT PAIN: ICD-10-CM

## 2020-06-08 DIAGNOSIS — M25.50 MULTIPLE JOINT PAIN: Primary | ICD-10-CM

## 2020-06-08 DIAGNOSIS — M53.3 SACROILIAC JOINT PAIN: ICD-10-CM

## 2020-06-08 DIAGNOSIS — M45.7 ANKYLOSING SPONDYLITIS OF LUMBOSACRAL REGION (H): ICD-10-CM

## 2020-06-08 DIAGNOSIS — M79.18 MYOFASCIAL PAIN: ICD-10-CM

## 2020-06-08 DIAGNOSIS — M51.369 DDD (DEGENERATIVE DISC DISEASE), LUMBAR: ICD-10-CM

## 2020-06-08 DIAGNOSIS — M47.816 SPONDYLOSIS OF LUMBAR REGION WITHOUT MYELOPATHY OR RADICULOPATHY: ICD-10-CM

## 2020-06-08 DIAGNOSIS — M06.9 RHEUMATOID ARTHRITIS INVOLVING MULTIPLE JOINTS (H): ICD-10-CM

## 2020-06-08 PROCEDURE — 96159 HLTH BHV IVNTJ INDIV EA ADDL: CPT | Mod: 95 | Performed by: PSYCHOLOGIST

## 2020-06-08 PROCEDURE — 96158 HLTH BHV IVNTJ INDIV 1ST 30: CPT | Mod: 95 | Performed by: PSYCHOLOGIST

## 2020-06-10 ENCOUNTER — VIRTUAL VISIT (OUTPATIENT)
Dept: PALLIATIVE MEDICINE | Facility: CLINIC | Age: 55
End: 2020-06-10
Payer: COMMERCIAL

## 2020-06-10 ENCOUNTER — TELEPHONE (OUTPATIENT)
Dept: PALLIATIVE MEDICINE | Facility: CLINIC | Age: 55
End: 2020-06-10

## 2020-06-10 ENCOUNTER — MYC REFILL (OUTPATIENT)
Dept: FAMILY MEDICINE | Facility: CLINIC | Age: 55
End: 2020-06-10

## 2020-06-10 ENCOUNTER — MYC MEDICAL ADVICE (OUTPATIENT)
Dept: FAMILY MEDICINE | Facility: CLINIC | Age: 55
End: 2020-06-10

## 2020-06-10 DIAGNOSIS — M25.561 CHRONIC PAIN OF BOTH KNEES: ICD-10-CM

## 2020-06-10 DIAGNOSIS — F17.200 SMOKING ADDICTION: ICD-10-CM

## 2020-06-10 DIAGNOSIS — M25.50 MULTIPLE JOINT PAIN: ICD-10-CM

## 2020-06-10 DIAGNOSIS — M54.50 CHRONIC BILATERAL LOW BACK PAIN WITHOUT SCIATICA: ICD-10-CM

## 2020-06-10 DIAGNOSIS — M25.562 CHRONIC PAIN OF BOTH KNEES: ICD-10-CM

## 2020-06-10 DIAGNOSIS — M45.6 ANKYLOSING SPONDYLITIS OF LUMBAR REGION (H): ICD-10-CM

## 2020-06-10 DIAGNOSIS — M79.18 MYOFASCIAL PAIN: ICD-10-CM

## 2020-06-10 DIAGNOSIS — M05.79 RHEUMATOID ARTHRITIS INVOLVING MULTIPLE SITES WITH POSITIVE RHEUMATOID FACTOR (H): ICD-10-CM

## 2020-06-10 DIAGNOSIS — M47.817 LUMBOSACRAL SPONDYLOSIS WITHOUT MYELOPATHY: ICD-10-CM

## 2020-06-10 DIAGNOSIS — M54.59 LUMBAR FACET JOINT PAIN: Primary | ICD-10-CM

## 2020-06-10 DIAGNOSIS — G89.4 CHRONIC PAIN SYNDROME: ICD-10-CM

## 2020-06-10 DIAGNOSIS — M51.369 DDD (DEGENERATIVE DISC DISEASE), LUMBAR: ICD-10-CM

## 2020-06-10 DIAGNOSIS — G89.29 CHRONIC PAIN OF BOTH KNEES: ICD-10-CM

## 2020-06-10 DIAGNOSIS — G89.29 CHRONIC BILATERAL LOW BACK PAIN WITHOUT SCIATICA: ICD-10-CM

## 2020-06-10 PROCEDURE — 99214 OFFICE O/P EST MOD 30 MIN: CPT | Mod: 95 | Performed by: NURSE PRACTITIONER

## 2020-06-10 NOTE — TELEPHONE ENCOUNTER
Screening questions for MBB Injections:    Injection to be done at which interventional clinic site? [facility] [place]     Instruct patient to arrive as directed prior to the scheduled appointment time:    [place]: 30 minutes before      Procedure ordered by Dr. Susana Pike    Procedure ordered? Lumbar Medial Branch Block    What insurance would patient like us to bill for this procedure? BCBS       Worker's comp- Any injection DO NOT SCHEDULE and route to Yasemin Eldridge.      [employer] insurance - If scheduling an SI joint injection DO NOT SCHEDULE and route to Yasemin Eldridge.       MBBs must be scheduled with elapsed time interval of at least 2 weeks and not more than 6 months between the First MBB and the Second MBB for insurance purposes       [employer] - Any injection besides hip/shoulder/knee joint DO NOT SCHEDULE and route to Yasemin Eldridge. She will obtain PA and call pt back to schedule procedure or notify pt of denial.       [employer]- PA required for all MBB's      **BCBS- ALL need to be routed to Yasemin for review if a PA is needed**    Any chance of pregnancy? Not Applicable   If YES, do NOT schedule and route to RN pool    Is an  needed? No     Patient has a drive home? (mandatory) Yes     Is patient taking any blood thinners (plavix, coumadin, jantoven, warfarin, heparin, pradaxa or dabigatran )? No    If hold needed, do NOT schedule, route to RN pool     Is patient taking any aspirin products? No     If more than 325mg/day, OK to schedule; Instruct pt to decrease to less than 325 mg for 7 days AND route to RN pool    For CERVICAL procedures, hold all aspirin products for 6 days.     Tell pt that if aspirin product is not held for 6 days, the procedure WILL BE cancelled.      Does the patient have a bleeding or clotting disorder? No    If YES, okay to schedule AND route to RN nurse pool    **For any patients with platelet count <100, must be forwarded to  provider**    Is patient diabetic? No If YES, have them bring their glucometer.    Does patient have an active infection or treated for one within the past week? No    Is patient currently taking any antibiotics?  No    For patients on chronic, preventative, or prophylactic antibiotics, procedures may be scheduled.     For patients on antibiotics for active or recent infection:antibiotic course must have been completed for 4 days    Is patient currently taking any steroid medications? (i.e. Prednisone, Medrol)  No     For patients on steroid medications, course must have been completed for 4 days    Is patient actively being treated for cancer or immunocompromised? No   If YES, do NOT schedule and route to RN    Are you able to get on and off an exam table with minimal or no assistance? Yes   If NO, do NOT schedule and route to RN    Are you able to roll over and lay on your stomach with minimal or no assistance? Yes   If NO, do NOT schedule and route to RN    Any allergies to contrast dye, iodine, shellfish, or numbing and steroid medications? No  (If so, inform nursing and note in scheduling comments.)    Allergies: Hydrocodone; Ms contin [morphine]; and Remeron soltab     Has the patient had a flu shot or any other vaccinations within 7 days before or after the procedure.  No     Does patient have an MRI/CT?  Not Applicable  Check Procedure Scheduling Grid to see if required.      Was the MRI done within the last 3 years?  NA    If yes, [facility], [place], [facility] etc?       If no, do not schedule and route to nursing    If MRI was not done at [place], [facility] or [facility] do NOT schedule and route to nursing.      If pt has an imaging disc, the injection MAY be scheduled but pt has to bring disc to appt.     If they show up without the disc the injection cannot be done      Medial Branch Block Pre-Procedure Instructions    It is okay to take long acting pain  medications (if you are on them) the day of the procedure but try not to take any short acting medications unless absolutely necessary.    YES: Informed    Long acting meds would include: Gabapentin (Neurontin), MS Contin, Oxycontin        Short acting meds would include:  Percocet, Oxycodone, Vicodin, Ibuprofen     The day of the procedure, you should try to do things that provoke your pain, since the injection is being done to see if it will relieve your pain . YES: Informed      If your pain level is a 4 out of 10 or less on the day of the procedu re, please call [phone] to reschedule.  YES: Informed      Reminders:      If you are started on any steroids or antibiotics between now and your appointment, you must contact us because it may affect our ability to perform your procedure       Instructed pt to arrive 30 minutes early for IV start if required. (Check Procedure Scheduling Grid) INot Applicable       If this is for a cervical MBB aspirin needs to be held for 6 days.  Not Applicable       Do not schedule procedures requiring IV placement in the first appointment of the day or first appointment after lunch. Do NOT schedule at 0745, 0815 or 1245.        For patients 85 or older we recommend having an adult stay w/ them for the remainder of the day.      Does the patient have any questions?

## 2020-06-10 NOTE — PROGRESS NOTES
The patient has been notified of following:     This telephone visit will be conducted via a call between you and your provider. We have found that certain health care needs can be provided without the need for a physical exam.  This service lets us provide the care you need with a phone conversation.  If a prescription is necessary we can send it directly to your pharmacy.  If lab work is needed we can place an order for that and you can then stop by our lab to have the test done at a later time. This is a billable service but we do not know the cost at this time.     Patient has given verbal consent for Telephone visit?  Yes  Nicolasa Krueger MA              [facility]    6/10/2020    Jailene Breen is a 54 year old male who is being evaluated via a billable telephone visit.        Chief complaint: lower  back pain. Bilateral knee pain, hand pain, shoulder pain.        Interval history:   Jailene Breen is a 54 year old male is known to me for   Lumbar spondylosis, pain is worse with extension and rotation indicating a facetogenic component to pain  Lumbar degenerative disc disease  SI joint pain  Ankylosing spondylitis  Myofascial pain  Chronic pain syndrome  PMHx includes: Panic attacks, back pain, arthritis, anxiety, ankylosing spondylitis  PSHx includes: Right knee surgery ×2, left foot surgery right knee arthroscopy        Recommendations/plan at the last visit on 4/24/2020 included:  1. Recommended to increase activity while pacing himself  2. Physical Therapy:  He is not currently involved with PHYSICAL THERAPY   3. Clinical Health Psychologist: patient requested referral to psychologist given recent death of niece to overdose and his recent pain flares. Referral placed  4. Diagnostic Studies:  None  5. Medication Management:    1. Continue OxyContin 10mg BID.   2. Continue Flexeril 5-10mg TID PRN  3. Short-script of oxycodone 5mg tabs max 4/day for flare  6. Further  "procedures recommended:none  7. Recommendations to PCP: see above  8. Follow up: 8 weeks          Since his last visit, Jailene Breen reports:  Interval history 6/10/2020  -having bilateral low back pain that radiates into the groin at times  -having multiple joint pain from RA, his RA flare is better as he is back on the Enbrel now  -he would love to get a new bed as he has issues getting comfortable in bed.   -he is having more left sided low back pain, worse with lumbar extension and extension and rotation. He is interested in pursing possible left sided lumbar RFA.      Interval 2020  -his 28 year old niece overdosed and  last Saturday, she had a 12,5 and 1 year old.   -he is going to her  today  -he did get his Enbrel yesterday  -he is going to see a new rheumatologist in July with the [facility]    -he says his pain is now a bit better  -he has multiple joint pain from RA and chronic low back pain        At this point, the patient's participation with our multidisciplinary team includes:  The patient has been compliant with the program.    PT - has seen Cyndee White, none recently  Health Psych - worked with  Padilla Keene, last on 2018.  Padilla Keene is no longer with our clinic.       Pain scores:    Pain intensity on average is 6-7 on a scale of 0-10.    Range is 5-9/10.   Pain right now is 8/10.   Pain is described as \"shooting, throbbing, penetrating, burning, and stabbing.\"  Pain is constant in nature    Current pain relevant medications:      -nortriptyline 50mg at bedtime (helpful)  -Enbrel 50mg/mL   -ibuprofen 800mg TID PRN (using 3 times daily-helpful)  -Tylenol 500mg using 1000mg TID (unsure if helpful)  -Maxalt 10mg PRN migraine (helpful for migraine--he does have visual aura)  -OxyContin 10mg BID (helpful)  -Flexeril 10mg TID PRN (helpful)  -naloxone nasal spray PRN for accidental opiate overdose      Other pertinent medications:   -clonazepam 1mg tab, may take " 0.5-1mg BID PRN anxiety (helpful, has been using infrequently)      Previous Medications:   OPIATES: Oxycodone (helpful), Fentanyl (100mcg/hr patch helpful), hydrocodone (itching), Tramadol (not helpful)   NSAIDS: ibuprofen (not helpful), Aleve (not helpful)  MUSCLE RELAXANTS: methocarbamol (somewhat helpful), Flexeril (somewhat helpful), tizanidine (unsure if helpful), metaxalone (unsure), SOMA (helpful)  ANTI-MIGRAINE MEDS: Maxalt (helpful for migraine), Imitrex injection (somewhat helpful)  ANTI-DEPRESSANTS: Prozac (helpful), Cymbalta (felt weird on it), nortriptyline (unsure if helpful), Buspar (unsure), Remeron (blacked out), bupropion (not helpful for smoking cessation)  SLEEP AIDS: Ambien (helpful)  ANTI-CONVULSANTS: gabapentin (felt odd on this medication, unsure of dose), Lyrica (not helpful for 4 months, unsure of dose), Topamax (not helpful, he felt weird on it)   TOPICALS: Lidocaine patches (not helpful), Voltaren gel (not helpful)  Other meds: Tylenol (unsure if helpful)        Other treatments have included:   Jailene JOAQUÍN Breen has been seen at a pain clinic in the past.  [facility]  PT: tried, not helpful  Chiropractic: tried, not helpful  Acupuncture: none  TENs Unit: tried, helpful     Injections:     -has had injections at outside clinics  -has had epidural injections for low back pain (one was helpful)  -he has had lumbar medial branch blocks at [facility] and he was going to have lumbar radiofrequency ablation (did not do this due to cost)  -4/3/2017 right LMBBs with Dr. Ashlyn Gamble (helpful)  -4/26/2017 right LMBBs with Dr. Ashlyn Gamble (helpful)  -5/31/2017 right L3, L4, L5 RFA with Dr. Ashlyn Gamble (good relief for over 6 months)  -3/15/2018 right L5 transforaminal MAKAYLA with Dr. Ashlyn Gamble (not helpful)  -5/10/2018 trigger point injections with Dr. Ashlyn Gamble(somewhat helpful).  -7/12/2018 Right L3, L4, L5 RFA with Dr. Ashlyn Gamble (helpful).  -9/20/2018 Left piriformis  injections, bilateral gluteal trigger point with Dr. Gamble (helpful).  -1/31/2019 right L2-3, L3-4 and L4-5 facet joint injections with Dr. Ashlyn Gamble (somewhat helpful)  4/4/2019  right L3, L4, L5/sacral ala lumbar radiofrequency ablation with Dr. Gamble (helpful over right side)  6/28/2019 Bilateral facet joint injections at l4-5, L5-S1 with Dr. Holley (only helpful for 7 days)  -2/12/2020 right K5-B7-S8-sacral ALA RFA done with Dr. Ashlyn Gamble (helpful)            Side Effects: no side effect  Patient is using the medication as prescribed: YES    Medications:  Current Outpatient Medications   Medication Sig Dispense Refill     albuterol (PROAIR HFA/PROVENTIL HFA/VENTOLIN HFA) 108 (90 Base) MCG/ACT inhaler Inhale 2 puffs into the lungs every 6 hours as needed for shortness of breath / dyspnea 1 Inhaler 0     atenolol (TENORMIN) 25 MG tablet Take 25 mg by mouth daily       clonazePAM (KLONOPIN) 1 MG tablet Take 0.5-1 tablets (0.5-1 mg) by mouth daily as needed for anxiety 30 tablet 1     cyclobenzaprine (FLEXERIL) 10 MG tablet Take 0.5-1 tablets (5-10 mg) by mouth 3 times daily as needed for muscle spasms Caution sedation 60 tablet 2     etanercept (ENBREL SURECLICK) 50 MG/ML autoinjector Inject 50 mg Subcutaneous once a week 1 kit 2     IBUPROFEN PO Take 800 mg by mouth every 6 hours as needed for moderate pain Reported on 5/19/2017       nicotine (NICORETTE) 2 MG gum Place 1 each (2 mg) inside cheek as needed for smoking cessation 240 each 1     oxyCODONE (OXYCONTIN) 10 MG 12 hr tablet Take 1 tablet (10 mg) by mouth every 12 hours Fill 6/6/2020. Begin 6/8/2020 60 tablet 0     oxyCODONE (ROXICODONE) 5 MG tablet Take 1 tablet (5 mg) by mouth every 6 hours as needed for severe pain Max of 4 tabs/day.  Short-term script, fill/begin 6/1/2020 40 tablet 0     predniSONE (DELTASONE) 20 MG tablet Take 1 tablet (20 mg) by mouth daily 5 tablet 0     sertraline (ZOLOFT) 100 MG tablet Take 1 tablet (100 mg) by mouth  daily 90 tablet 1     etanercept (ENBREL) 50 MG/ML injection        folic acid (FOLVITE) 1 MG tablet Reported on 5/19/2017       naloxone (NARCAN) 4 MG/0.1ML nasal spray Spray 1 spray (4 mg) into one nostril alternating nostrils as needed for opioid reversal every 2-3 minutes until assistance arrives (Patient not taking: Reported on 2/12/2020) 0.2 mL 0     sildenafil (REVATIO) 20 MG tablet Take 2-5 tabs one hour before sex (Patient not taking: Reported on 2/12/2020) 30 tablet 3       Medical History: any changes in medical history since they were last seen? No    Social History:    Home situation: , has 2 children in college  Occupation/Schooling: currently unemployed, worked as a  (unemployed since 3/1/2017). 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  Uses chew  If yes, was cessation counseling offered?  Not today    Review of Systems:    The 14 system ROS was reviewed with patient and is positive for:  Constitutional: fever/chills, fatigue, weight gain, weight loss  Eyes/Head: headache, dizziness  ENT: ringing in ears  Allergy/Immune: allergies  Skin: itching, rash, hives  Hematologic: easy bruising  Respiratory: cough, wheezing, shortness of breath  Cardiovascular: swelling in feet, fainting, palpitations, chest pain  GI: abdominal pain, nausea, vomiting, diarrhea, constipation  Endocrine: steroid use  Musculoskeletal:  joint pain, arthritis, stiffness, gout, back pain, neck pain  Urinary: frequency, urgency, incontinence, hesitancy  Neurologic: weakness, numbness/tingling, seizure, stroke, memory loss  Mental health: depression, anxiety, stress, suicidal ideation          Physical Exam:     Vital signs: There were no vitals taken for this visit.     Behavioral observations:  Awake, alert, cooperative.           [place] Prescription  Monitoring Program:  Reviewed [facility] 6/10/2020- no concerning fills.  Susana GRANT RN CNP, JAQUELINP  [facility] [place]        DIRE Score for selecting candidates for long term opioid analgesia for chronic pain:  Diagnosis  2  Intractablility  2  Risk    Psychological health  2    Chemical health  2    Reliability  2    Social support  2  Efficacy  2    Total DIRE Score = 14. Note that  7-13 predicts poor outcome (compliance and efficacy) from opioid prescribing; 14-21 predicts good outcome (compliance and efficacy)  from opioid prescribing.      Assessment:     1. Lumbar facet joint pain Left >> right   2. Lumbosacral spondylosis without myelopathy  3. Chronic low back pain without sciatica  4. Multiple joint pain  5. RA involving multiple joints  6. Ankylosing spondylitis of lumbosacral region  7. DDD, lumbar  8. Myofascial pain  9. Chronic pain syndrome  10. Chronic pain of both knees     11. Encounter for long-term opiate analgesic use  12. PMHx includes: Panic attacks, back pain, arthritis, anxiety, ankylosing spondylitis  13. PSHx includes: Right knee surgery ×2, left foot surgery right knee arthroscopy      Plan:    1. Recommended to increase activity while pacing himself  2. Physical Therapy:  Referral to PHYSICAL THERAPY Clinical Health Psychologist: working regularly with Jonna Farrell  3. Diagnostic Studies:  None  4. Medication Management:    1. Continue OxyContin 10mg BID.   2. Continue Flexeril 5-10mg TID PRN  3. Short-script of oxycodone 5mg tabs max 4/day for flare  5. Further procedures recommended:none  6. Recommendations to PCP: see above  7. Follow up: 8 weeks      Phone call duration: 28 minutes        Susana GRANT RN CNP, FNP  [facility]

## 2020-06-10 NOTE — TELEPHONE ENCOUNTER
No PA required, okay to schedule      Yasemin MILLAN    [place] [facility]

## 2020-06-10 NOTE — PROGRESS NOTES
The patient has been notified of following:     This telephone visit will be conducted via a call between you and your provider. We have found that certain health care needs can be provided without the need for a physical exam.  This service lets us provide the care you need with a phone conversation.  If a prescription is necessary we can send it directly to your pharmacy.  If lab work is needed we can place an order for that and you can then stop by our lab to have the test done at a later time. This is a billable service but we do not know the cost at this time.     Patient has given verbal consent for Telephone visit?  Yes  Nicolasa Krueger MA

## 2020-06-13 ENCOUNTER — MYC REFILL (OUTPATIENT)
Dept: PALLIATIVE MEDICINE | Facility: CLINIC | Age: 55
End: 2020-06-13

## 2020-06-13 ENCOUNTER — MYC MEDICAL ADVICE (OUTPATIENT)
Dept: PALLIATIVE MEDICINE | Facility: CLINIC | Age: 55
End: 2020-06-13

## 2020-06-13 ENCOUNTER — OFFICE VISIT (OUTPATIENT)
Dept: URGENT CARE | Facility: URGENT CARE | Age: 55
End: 2020-06-13
Payer: COMMERCIAL

## 2020-06-13 ENCOUNTER — NURSE TRIAGE (OUTPATIENT)
Dept: NURSING | Facility: CLINIC | Age: 55
End: 2020-06-13

## 2020-06-13 ENCOUNTER — TELEPHONE (OUTPATIENT)
Dept: PALLIATIVE MEDICINE | Facility: CLINIC | Age: 55
End: 2020-06-13

## 2020-06-13 VITALS
BODY MASS INDEX: 32.38 KG/M2 | OXYGEN SATURATION: 99 % | SYSTOLIC BLOOD PRESSURE: 151 MMHG | DIASTOLIC BLOOD PRESSURE: 94 MMHG | HEART RATE: 98 BPM | TEMPERATURE: 98.6 F | WEIGHT: 266 LBS | RESPIRATION RATE: 20 BRPM

## 2020-06-13 DIAGNOSIS — G89.4 CHRONIC PAIN SYNDROME: ICD-10-CM

## 2020-06-13 DIAGNOSIS — Z71.89 PAIN MANAGEMENT CONTRACT DISCUSSED: ICD-10-CM

## 2020-06-13 DIAGNOSIS — M06.9 RHEUMATOID ARTHRITIS INVOLVING MULTIPLE JOINTS (H): ICD-10-CM

## 2020-06-13 DIAGNOSIS — M06.9 FLARE OF RHEUMATOID ARTHRITIS (H): ICD-10-CM

## 2020-06-13 DIAGNOSIS — Z76.0 ENCOUNTER FOR MEDICATION REFILL: Primary | ICD-10-CM

## 2020-06-13 PROCEDURE — 99213 OFFICE O/P EST LOW 20 MIN: CPT | Performed by: PHYSICIAN ASSISTANT

## 2020-06-13 RX ORDER — OXYCODONE HYDROCHLORIDE 5 MG/1
5 TABLET ORAL EVERY 6 HOURS PRN
Qty: 40 TABLET | Refills: 0 | Status: CANCELLED | OUTPATIENT
Start: 2020-06-13

## 2020-06-13 NOTE — TELEPHONE ENCOUNTER
On call page received from patient. He reports having a flare of pain due to rheumatoid arthritis. He is requesting a refill of oxycodone to help with the flare. Discussed that at this time he should utilize over the counter medications such as tylenol, topical medications and his muscle relaxant. He should also utilize ice or heat if helpful. The request for oxycodone will need to be addressed by his primary pain provider when clinic opens on Monday.     Blanca Chanel MD  [facility]

## 2020-06-13 NOTE — PROGRESS NOTES
" S: 54-year-old male presents for Percocet refill.  Has a pain provider and has a opioid contract from January 21, 2020.  It states he will not ask other providers to prescribe controlled substances and will not except controlled substances from other people.  He states he has had flareup of his rheumatoid in his hands and shoulders over the past few days.  He is currently also using prednisone 30 mg a day for the flare.  He uses Enbrel for his rheumatoid arthritis.  He has an appointment with the [place] rheumatologist in July.  His prior rheumatologist was through [facility].  On June 1 he was given 60 tablets of OxyContin and 40 tablets of Percocet.      Allergies   Allergen Reactions     Hydrocodone Itching     Ms Contin [Morphine] Itching     Remeron Soltab Other (See Comments)     \"Blacked out\" for one week       Past Medical History:   Diagnosis Date     Ankylosing spondylitis (H) 3/1/2017     Anxiety      Arthritis     back, knees     Back pain     possible drug seeking behavior in the past.      Panic attacks        albuterol (PROAIR HFA/PROVENTIL HFA/VENTOLIN HFA) 108 (90 Base) MCG/ACT inhaler, Inhale 2 puffs into the lungs every 6 hours as needed for shortness of breath / dyspnea  atenolol (TENORMIN) 25 MG tablet, Take 25 mg by mouth daily  cyclobenzaprine (FLEXERIL) 10 MG tablet, Take 0.5-1 tablets (5-10 mg) by mouth 3 times daily as needed for muscle spasms Caution sedation  etanercept (ENBREL SURECLICK) 50 MG/ML autoinjector, Inject 50 mg Subcutaneous once a week  IBUPROFEN PO, Take 800 mg by mouth every 6 hours as needed for moderate pain Reported on 5/19/2017  nicotine (NICORETTE) 2 MG gum, Place 1 each (2 mg) inside cheek as needed for smoking cessation  oxyCODONE (OXYCONTIN) 10 MG 12 hr tablet, Take 1 tablet (10 mg) by mouth every 12 hours Fill 6/6/2020. Begin 6/8/2020  oxyCODONE (ROXICODONE) 5 MG tablet, Take 1 tablet (5 mg) by mouth every 6 hours as needed for severe pain Max of 4 tabs/day.  " Short-term script, fill/begin 6/1/2020  predniSONE (DELTASONE) 20 MG tablet, Take 1 tablet (20 mg) by mouth daily  sertraline (ZOLOFT) 100 MG tablet, Take 1 tablet (100 mg) by mouth daily  clonazePAM (KLONOPIN) 1 MG tablet, Take 0.5-1 tablets (0.5-1 mg) by mouth daily as needed for anxiety (Patient not taking: Reported on 6/13/2020)  folic acid (FOLVITE) 1 MG tablet, Reported on 5/19/2017  naloxone (NARCAN) 4 MG/0.1ML nasal spray, Spray 1 spray (4 mg) into one nostril alternating nostrils as needed for opioid reversal every 2-3 minutes until assistance arrives  sildenafil (REVATIO) 20 MG tablet, Take 2-5 tabs one hour before sex (Patient not taking: Reported on 2/12/2020)    fentaNYL (PF) (SUBLIMAZE) injection 12.5-50 mcg        Social History     Tobacco Use     Smoking status: Never Smoker     Smokeless tobacco: Former User     Types: Chew     Tobacco comment: Chew   Substance Use Topics     Alcohol use: No     Comment: none     Drug use: No       ROS:  General: negative for fever  Musculoskeletal - as above  Psych- some agitation    OBJECTIVE:  BP (!) 151/94 (BP Location: Left arm, Patient Position: Chair, Cuff Size: Adult Large)   Pulse 98   Temp 98.6  F (37  C) (Tympanic)   Resp 20   Wt 120.7 kg (266 lb)   SpO2 99%   BMI 32.38 kg/m     General:   awake, alert, and cooperative.  NAD.   Head: Normocephalic, atraumatic.  Eyes: Conjunctiva clear, non icteric.   Neuro: Alert and oriented - normal speech.   Hands- diffuses redness, mild finger swelling, mild tenderness    ASSESSMENT:    ICD-10-CM    1. Encounter for medication refill  Z76.0    2. Flare of rheumatoid arthritis (H)  M06.9    3. Pain management contract discussed  Z71.89          PLAN:  No Percocet. Left angry but I am following the terms of the pain contract. To ER if can't tolerate the pain. Otherwise F/U with pain clinic on Monday.    Gris Coulter PA-C

## 2020-06-13 NOTE — TELEPHONE ENCOUNTER
Having a bad RA flare-up - every ache and pain is hurting badly. Tried calling the pain clinic - they said they can't do anything until Monday.     Reviewed pain clinic note - advised patient the only other option would be an ED visit for pain control. Patient not wanting to go to ED - afraid of anthony Covid. Unsure how patient will proceed.    COVID 19 Nurse Triage Plan/Patient Instructions    Please be aware that novel coronavirus (COVID-19) may be circulating in the community. If you develop symptoms such as fever, cough, or SOB or if you have concerns about the presence of another infection including coronavirus (COVID-19), please contact your health care provider or visit [email or web address].     Disposition/Instructions    Patient to go to ED and follow protocol based instructions.     Bring Your Own Device:  Please also bring your smart device(s) (smart phones, tablets, laptops) and their charging cables for your personal use and to communicate with your care team during your visit.    Thank you for taking steps to prevent the spread of this virus.  o Limit your contact with others.  o Wear a simple mask to cover your cough.  o Wash your hands well and often.    Resources    [facility]: About COVID-19: [email or web address]    CDC: What to Do If You're Sick: www.cdc.gov/coronavirus/2019-ncov/about/steps-when-sick.html    CDC: Ending Home Isolation: www.cdc.gov/coronavirus/2019-ncov/hcp/disposition-in-home-patients.html     CDC: Caring for Someone: www.cdc.gov/coronavirus/2019-ncov/if-you-are-sick/care-for-someone.html     [facility]: Interim Guidance for Hospital Discharge to Home: www.health.[facility].[place].us/diseases/coronavirus/hcp/hospdischarge.pdf    [facility] clinical trials (COVID-19 research studies): clinicalaffairs.[facility]/umn-clinical-trials     Below are the COVID-19 hotlines at the [place] Department of Health ([facility]). Interpreters are available.   o For health questions: Call  "[phone] or 1-462.596.6788 (7 a.m. to 7 p.m.)  o For questions about schools and childcare: Call 900-129-6282 or 1-428.839.4040 (7 a.m. to 7 p.m.)       Libby Bhardawj RN on 6/13/2020 at 2:29 PM    Reason for Disposition    Caller has NON-URGENT medication question about med that PCP prescribed and triager unable to answer question    Additional Information    Negative: Drug overdose and nurse unable to answer question    Negative: Caller requesting information not related to medicine    Negative: Caller requesting a prescription for Strep throat and has a positive culture result    Negative: Rash while taking a medication or within 3 days of stopping it    Negative: Immunization reaction suspected    Negative: [1] Asthma AND [2] having symptoms of asthma (cough, wheezing, etc)    Negative: MORE THAN A DOUBLE DOSE of a prescription or over-the-counter (OTC) drug    Negative: [1] DOUBLE DOSE (an extra dose or lesser amount) of over-the-counter (OTC) drug AND [2] any symptoms (e.g., dizziness, nausea, pain, sleepiness)    Negative: [1] DOUBLE DOSE (an extra dose or lesser amount) of prescription drug AND [2] any symptoms (e.g., dizziness, nausea, pain, sleepiness)    Negative: Took another person's prescription drug    Negative: [1] DOUBLE DOSE (an extra dose or lesser amount) of prescription drug AND [2] NO symptoms (Exception: a double dose of antibiotics)    Negative: Diabetes drug error or overdose (e.g., insulin or extra dose)    Negative: [1] Request for URGENT new prescription or refill of \"essential\" medication (i.e., likelihood of harm to patient if not taken) AND [2] triager unable to fill per unit policy    Negative: [1] Prescription not at pharmacy AND [2] was prescribed today by PCP    Negative: Pharmacy calling with prescription questions and triager unable to answer question    Negative: Caller has urgent medication question about med that PCP prescribed and triager unable to answer " question    Protocols used: MEDICATION QUESTION CALL-A-AH

## 2020-06-13 NOTE — NURSING NOTE
The patient was very anxious and fidgety during the appointment. He indicated that he was very nervous to be here at this time due to COVID-19 since he is immunocompromised.  Lupis Schneider MA

## 2020-06-15 ENCOUNTER — MYC MEDICAL ADVICE (OUTPATIENT)
Dept: PALLIATIVE MEDICINE | Facility: CLINIC | Age: 55
End: 2020-06-15

## 2020-06-15 DIAGNOSIS — G89.4 CHRONIC PAIN SYNDROME: ICD-10-CM

## 2020-06-15 DIAGNOSIS — M06.9 RHEUMATOID ARTHRITIS INVOLVING MULTIPLE JOINTS (H): ICD-10-CM

## 2020-06-15 NOTE — TELEPHONE ENCOUNTER
Per patient myChart message:  Jamison Breen   to Susana Pike APRN CNP       6/13/20 10:35 AM   Hi could you please refill the oxycodone.  It seemed to have my flare up somewhat under control until I ran out and now being hit pretty hard. Please send to [facility] pharmacy.  ________________    Pt was JUANITA Ramon CNP on 6/10/20-the note is not done.  Pt was not to be on oxycodone ongoing.      JUANITA Ramon CNP do you want to prescribe?  If so route to MA pool and the RN pool to process.    Cha Molina, RN-BSN  [facility]

## 2020-06-15 NOTE — TELEPHONE ENCOUNTER
See the 6/13/20 mychart refill encounter for more information.    Cha Molina RN-BSN  [facility]

## 2020-06-15 NOTE — TELEPHONE ENCOUNTER
See the 6/13/20 encounter for refill information.    Cha Molina RN-BSN  [facility]

## 2020-06-15 NOTE — TELEPHONE ENCOUNTER
Pt called, stated he is in extreme pain. Offered 1st avail virtual visit with Susana Pike. Pt requesting a call back from nursing.        Pj BARBOSA    [facility] [place]

## 2020-06-16 RX ORDER — OXYCODONE HYDROCHLORIDE 5 MG/1
5 TABLET ORAL EVERY 6 HOURS PRN
Qty: 120 TABLET | Refills: 0 | Status: SHIPPED | OUTPATIENT
Start: 2020-06-16 | End: 2020-07-09

## 2020-06-16 NOTE — TELEPHONE ENCOUNTER
See the 6/15/20 mychart encounter for more information.    Cha Molina RN-BSN  [facility]     Ambulatory

## 2020-06-16 NOTE — TELEPHONE ENCOUNTER
Tariq sent to pt:  From: Cha Molina RN      Created: 6/16/2020 10:54 AM      Susana Boyle APRN CNP wants if you have any oxycodone tabs remaining and how many tabs/day you have been taking.  When was your last dose?  The oxycodone was prescribed for you to use when you were not taking enbrel.  What day did you restart the Enbrel?     Cha Molina RN-BSN  [facility]

## 2020-06-16 NOTE — TELEPHONE ENCOUNTER
OK, since he just started back on the Enbrel on Saturday, I will allow him Oxycodone 4/day for 30 days, next month will reduce to max of 3/day for 30 days, then 2/day for 30 days, then hopefully off or drop to 1/day.    Signed Prescriptions:                        Disp   Refills    oxyCODONE (ROXICODONE) 5 MG tablet         120 ta*0        Sig: Take 1 tablet (5 mg) by mouth every 6 hours as needed           for severe pain Max of 4 tabs/day.  30 days for           chronic pain.  Authorizing Provider: SUSANA PETTY    Reviewed [place]  June 16, 2020- no concerning fills.    Susana GRANT, RN CNP, FNP  [facility]  [facility]

## 2020-06-16 NOTE — TELEPHONE ENCOUNTER
Called pt and read JUANITA Ramon CNP's plan to him.  He is okay w/ this plan.  Asked him if he is safe.  He says he is but his family doesn't understand.  He is seeing our pain psychology weekly which is helpful.  He denies self harm.    Cha Molina, RN-BSN  [facility]

## 2020-06-16 NOTE — PATIENT INSTRUCTIONS
Plan:    1. Recommended to increase activity while pacing himself  2. Physical Therapy:  Referral to PHYSICAL THERAPY Clinical Health Psychologist: working regularly with Jonna Farrell  3. Diagnostic Studies:  None  4. Medication Management:    1. Continue OxyContin 10mg BID.   2. Continue Flexeril 5-10mg TID PRN  3. Short-script of oxycodone 5mg tabs max 4/day for flare  5. Further procedures recommended:none  6. Recommendations to PCP: see above  7. Follow up: 8 weeks

## 2020-06-16 NOTE — TELEPHONE ENCOUNTER
Per patient myChart message:  From: Jamison Breen      Created: 6/16/2020 11:18 AM      Just an FYI on the enbrel site they say it can take upto 3 months for full effect, maybe that is why I'm in so much pain still. I just need to make it to my rheumatologist appointment.      And   From: Jamison Breen      Created: 6/16/2020 11:54 AM      No I don't  last dose was Friday. I took them as prescribed 3-4 a day at most.      And     From: Jamison Breen      Created: 6/16/2020 11:01 AM      I also will be seeing the new rheumatologist on July 9 so hopefully she can sort this out then. Just please help me get by till then.

## 2020-06-16 NOTE — TELEPHONE ENCOUNTER
Per patient myChart message:    From: Jamison Breen      Created: 6/16/2020 12:13 PM      On Saturday the 6th      And  From: Jamison Breen      Created: 6/16/2020 12:22 PM      Is there anyway you can call me for more questions, the typing is really hurting my fingers.        Routed to JUANITA Ramon CNP.    Cha Molina RN-BSN  [facility]

## 2020-06-16 NOTE — TELEPHONE ENCOUNTER
Correspondence pulled over from a closed encounter:    Blanca Chanel MD      6/13/20     On call page received from patient. He reports having a flare of pain due to rheumatoid arthritis. He is requesting a refill of oxycodone to help with the flare. Discussed that at this time he should utilize over the counter medications such as tylenol, topical medications and his muscle relaxant. He should also utilize ice or heat if helpful. The request for oxycodone will need to be addressed by his primary pain provider when clinic opens on Monday.      MD CHELSEY Angel [facility]       And      6/15/20  Per patient [place] message:  Jamison Breen   to Susana Pike APRN CNP       6/13/20 10:35 AM   Hi could you please refill the oxycodone.  It seemed to have my flare up somewhat under control until I ran out and now being hit pretty hard. Please send to [facility] pharmacy.________________     Pt was JUANITA Ramon CNP on 6/10/20-the note is not done.  Pt was not to be on oxycodone ongoing.       JUANITA Ramon CNP do you want to prescribe?  If so route to MA pool and the RN kaylynn to process.    Cha Molina, RN-BSN  [facility]      6/15/204:22 PM      Pt called, stated he is in extreme pain. Offered 1st avail virtual visit with Susana Pike. Pt requesting a call back from nursing.      Elsa BARBOSA    [facility] [place]

## 2020-06-16 NOTE — TELEPHONE ENCOUNTER
Per patient myChart message:  From: Jamison Breen      Created: 6/15/2020 10:06 AM      Hi, its really been a hard weekend.  I tried calling the on-call but they said they can't do anything during the weekend and I even went to urgent care and they couldn't do anything to help.  Haven't been able to sleep because of the pain and everything hurts. Don't think my enbrel is working as well as it should or things are getting worse.  Its just been a real nightmare.      And  From: Jamison Breen      Created: 6/15/2020 7:04 PM      Well all I can say is life really has been hell. I haven't been able to sleep since Friday because of the pain and I've done everything I can.  My life is really hopeless, I hate it the way it is. Can't get in till July 9th to see the new rheumatologist, which is a lifetime away.  I even called the on call on Saturday but she couldn't do anything to help.  If someone could steps in my shoes one day they would not deal with this. Im venting because I'm very frustrated.  I tried to get ahold of someone today but no response even the on call tonight didn't return the call.  I can't go anywhere else for help so I'm really in a pickle.      __________________________________      Last office visit was 6/10/20. The notes are not done at this time.     Received call from patient requesting refill(s) of oxycodone     Last dispensed from pharmacy on 6/1/20 per MPMP., this was a short term 10 day script.  Due date past due but TBD if another refill is an option    Pt last seen on 6/10/20  Next appt scheduled for 9/22/20     checked in the past 6 months? Yes If no, print current report and give to RN    Last urine drug screen 1/2020  Current opioid agreement on file Yes Date: 1/2020    Processing (pick one):      E-prescribe to [facility] pharmacy    Cha Molina RN-BSN  [facility]

## 2020-06-16 NOTE — TELEPHONE ENCOUNTER
See the other 6/15/20 mychart encounter for more information.    Cha Molina RN-BSN  [facility]

## 2020-06-17 ENCOUNTER — MYC MEDICAL ADVICE (OUTPATIENT)
Dept: PALLIATIVE MEDICINE | Facility: CLINIC | Age: 55
End: 2020-06-17

## 2020-06-25 ENCOUNTER — VIRTUAL VISIT (OUTPATIENT)
Dept: PALLIATIVE MEDICINE | Facility: CLINIC | Age: 55
End: 2020-06-25
Payer: COMMERCIAL

## 2020-06-25 DIAGNOSIS — M51.369 DDD (DEGENERATIVE DISC DISEASE), LUMBAR: ICD-10-CM

## 2020-06-25 DIAGNOSIS — M25.50 MULTIPLE JOINT PAIN: Primary | ICD-10-CM

## 2020-06-25 DIAGNOSIS — M79.18 MYOFASCIAL PAIN: ICD-10-CM

## 2020-06-25 DIAGNOSIS — M54.59 LUMBAR FACET JOINT PAIN: ICD-10-CM

## 2020-06-25 DIAGNOSIS — M45.6 ANKYLOSING SPONDYLITIS OF LUMBAR REGION (H): ICD-10-CM

## 2020-06-25 DIAGNOSIS — M53.3 SACROILIAC JOINT PAIN: ICD-10-CM

## 2020-06-25 DIAGNOSIS — M06.9 RHEUMATOID ARTHRITIS INVOLVING MULTIPLE JOINTS (H): ICD-10-CM

## 2020-06-25 DIAGNOSIS — M47.816 SPONDYLOSIS OF LUMBAR REGION WITHOUT MYELOPATHY OR RADICULOPATHY: ICD-10-CM

## 2020-06-25 PROCEDURE — 96158 HLTH BHV IVNTJ INDIV 1ST 30: CPT | Mod: 95 | Performed by: PSYCHOLOGIST

## 2020-06-25 PROCEDURE — 96159 HLTH BHV IVNTJ INDIV EA ADDL: CPT | Mod: 95 | Performed by: PSYCHOLOGIST

## 2020-06-25 NOTE — PROGRESS NOTES
"Jailene Breen is a 54 year old male who is being evaluated via a billable video visit.      The patient has been notified of following:     \"This video visit will be conducted via a call between you and your physician/provider. We have found that certain health care needs can be provided without the need for an in-person physical exam.  This service lets us provide the care you need with a video conversation.  If a prescription is necessary we can send it directly to your pharmacy.  If lab work is needed we can place an order for that and you can then stop by our lab to have the test done at a later time.    Video visits are billed at different rates depending on your insurance coverage.  Please reach out to your insurance provider with any questions.    If during the course of the call the physician/provider feels a video visit is not appropriate, you will not be charged for this service.\"    Patient has given verbal consent for Video visit? Yes    Patient would like the video invitation sent by: Text to cell phone: [phone]}    Video Start Time: 8:00 AM    Additional provider notes:      Pain Diagnoses per pain provider:    Multiple joint pain       Ankylosing spondylitis of lumbosacral region (H)       Rheumatoid arthritis involving multiple joints (H)       Lumbar facet joint pain       Sacroiliac joint pain       Myofascial pain       Spondylosis of lumbar region without myelopathy or radiculopathy       DDD (degenerative disc disease), lumbar         DATA: Patient reports his pain is more manageable - mildly improved. Patient's mood is mildly worse - feels he has not been able to be as busy as he'd like due to pain. Activity level is mildly reduced as he reports general lack of motivation. Stress level is about the same - had increased with increased pain, however this has returned to more stable. Sleep hygiene is Improved due to increased medication. Patient reports engaging in self-care for his " pain 2-3 times per day. Reports decreased motivation to engage in projects, and also reports pain in his hands also makes it difficult to hold tools as well. Discussed setting measurable goals for himself that are realistic and achievable. Discussed some strategies to manage anticipatory anxiety about pain during medial branch block. Discussed pacing activity, breaking tasks into manageable chunks of time with breaks in between. Recently completed medical bankruptcy.    ASSESSMENT: Easily engaged and readily utilizes skills discussed in sessions. Patient seems to recognize how his pain and mood are interconnected and impact each other.   Progress toward goals: good.    Pain Status: improved and had fluctuating course    Emotional Status: worsened              Medication / chemical use concerns:  None    PLAN:   Next Appointment: Jailene JOAQUÍN Breen's next appointment is scheduled for 7/13 at 8:00 am.  Assignment/Objectives /interventions for next session: Continue to work on pacing, setting realistic and achievable goals for himself.     Video-Visit Details    Type of service:  Video Visit    Video End Time (time video stopped): 8:54 am    Originating Location (pt. Location): Home    Distant Location (provider location):  [facility]     Mode of Communication:  Video Conference via Jameel Farrell PsyD LP  Licensed Psychologist  Outpatient Clinic Therapist  [facility]

## 2020-06-28 ENCOUNTER — MYC MEDICAL ADVICE (OUTPATIENT)
Dept: PALLIATIVE MEDICINE | Facility: CLINIC | Age: 55
End: 2020-06-28

## 2020-06-28 DIAGNOSIS — M79.18 MYOFASCIAL PAIN: ICD-10-CM

## 2020-06-28 DIAGNOSIS — G89.4 CHRONIC PAIN SYNDROME: ICD-10-CM

## 2020-06-28 DIAGNOSIS — M47.816 SPONDYLOSIS OF LUMBAR REGION WITHOUT MYELOPATHY OR RADICULOPATHY: ICD-10-CM

## 2020-06-28 DIAGNOSIS — M45.7 ANKYLOSING SPONDYLITIS OF LUMBOSACRAL REGION (H): ICD-10-CM

## 2020-06-28 DIAGNOSIS — M25.561 CHRONIC PAIN OF BOTH KNEES: ICD-10-CM

## 2020-06-28 DIAGNOSIS — G89.29 CHRONIC PAIN OF BOTH KNEES: ICD-10-CM

## 2020-06-28 DIAGNOSIS — M25.562 CHRONIC PAIN OF BOTH KNEES: ICD-10-CM

## 2020-06-28 DIAGNOSIS — M54.59 LUMBAR FACET JOINT PAIN: ICD-10-CM

## 2020-06-28 DIAGNOSIS — M53.3 SACROILIAC JOINT PAIN: ICD-10-CM

## 2020-06-28 DIAGNOSIS — M51.369 DDD (DEGENERATIVE DISC DISEASE), LUMBAR: ICD-10-CM

## 2020-06-29 RX ORDER — OXYCODONE HCL 10 MG/1
10 TABLET, FILM COATED, EXTENDED RELEASE ORAL EVERY 12 HOURS
Qty: 60 TABLET | Refills: 0 | Status: SHIPPED | OUTPATIENT
Start: 2020-06-29 | End: 2020-07-29

## 2020-06-29 NOTE — TELEPHONE ENCOUNTER
Received call from patient requesting refill(s) of oxyCODONE (OXYCONTIN) 10 MG 12 hr tablet    Last dispensed from pharmacy on 06/06/20    Pt last seen by prescribing provider on 06/10/20-virtual visit  Next appt scheduled for 09/22/20     checked in the past 6 months? Yes If no, print current report and give to RN    Last urine drug screen date 01/21/20  Current opioid agreement on file (completed within the last year) Yes Date of opioid agreement: 01/21/20    Processing (pick one and delete the others):      E-prescribe to pharmacy-[id number] - [place] - [address]     Will route to nursing pool for review and preparation of prescription(s).

## 2020-06-29 NOTE — TELEPHONE ENCOUNTER
Tariq message from patient on Sunday 6/28 at 1936:    Hi, I'm just doing this ahead of time so I don't forget since I'm a bit under the weather.  Could you please refill my oxycontin and send to [facility] in [place] , thanks  take care.  -------------    Will route to MA pool for assistance with gathering opioid refill information.    KERLINE ParkerN, RN-BC  Patient Care Supervisor/Care Coordinator  [facility] [place]

## 2020-06-29 NOTE — TELEPHONE ENCOUNTER
Medication refill information reviewed.     Due date for oxyCODONE (OXYCONTIN) 10 MG 12 hr tablet is 7/8/2020     Prescriptions prepped for review.     Will route to provider.     Josias Lopez RN  Care Coordinator   [facility] [place]

## 2020-06-30 NOTE — TELEPHONE ENCOUNTER
Signed Prescriptions:                        Disp   Refills    oxyCODONE (OXYCONTIN) 10 MG 12 hr tablet   60 tab*0        Sig: Take 1 tablet (10 mg) by mouth every 12 hours Fill           7/6/2020. Begin 7/8/2020  Authorizing Provider: SUSANA PETTY    Reviewed [place]  June 29, 2020- no concerning fills.    Susana GRANT, RN CNP, FNP  [facility]  [facility]

## 2020-07-01 ENCOUNTER — MYC MEDICAL ADVICE (OUTPATIENT)
Dept: FAMILY MEDICINE | Facility: CLINIC | Age: 55
End: 2020-07-01

## 2020-07-03 ENCOUNTER — TELEPHONE (OUTPATIENT)
Dept: FAMILY MEDICINE | Facility: CLINIC | Age: 55
End: 2020-07-03

## 2020-07-03 NOTE — TELEPHONE ENCOUNTER
Reason for Call:  RED FLAG    Detailed comments: patient has blood in stool    Phone Number Patient can be reached at: Home number on file [phone] (home)    Best Time: any    Can we leave a detailed message on this number? YES    Call taken on 7/3/2020 at 1:03 PM by Gee Meeks

## 2020-07-03 NOTE — TELEPHONE ENCOUNTER
Patient is calling with blood in the stool at 12:45 pm today.  All morning had loose stool of about 4-5 times.    Had upset stomach, acid reflex, diarrhea all morning.  Denies SOB, feeling tiered or feeling different.    Last time had diarrhea, toilet was bright red.  Earlier times, stool was brown.    Patient was advised to go to ER today for further evaluation.  Adina Guerra RN

## 2020-07-06 ENCOUNTER — MYC MEDICAL ADVICE (OUTPATIENT)
Dept: PALLIATIVE MEDICINE | Facility: CLINIC | Age: 55
End: 2020-07-06

## 2020-07-07 ENCOUNTER — MYC MEDICAL ADVICE (OUTPATIENT)
Dept: FAMILY MEDICINE | Facility: CLINIC | Age: 55
End: 2020-07-07

## 2020-07-07 DIAGNOSIS — F17.200 SMOKING ADDICTION: ICD-10-CM

## 2020-07-08 NOTE — TELEPHONE ENCOUNTER
Tariq message from patient on 07/08/2020 at 0924:  Its about my nicotine gum that she refilled. Not sure why it was sent to you.    Routing to Katarina Solis RN  Care Coordinator  [facility]

## 2020-07-08 NOTE — TELEPHONE ENCOUNTER
Hi Les-    I think this message was intended for me and it was sent to your Primary Care Provider.     Please clarify which medication and provider you are inquiring about.     Then I can try to address the issue if it is meant for me.     Thanks.  Susana GRANT RN CNP, JAQUELINP  [facility] location      ===View-only below this line===      ----- Message -----     From: Jailene Breen     Sent: 7/7/2020 12:23 PM CDT       To: Nanette Haywood PA-C  Subject: Question about medications    Hi, the previous prescription before  6/10 could get refilled every 10 days, the one on 6/10 that you refilled is every 20 days. Can you please go back to 10 days is what I was asking.      I have sent the above [email or web address] message to the patient.     Susana GRANT RN CNP, XUAN  [facility]

## 2020-07-09 ENCOUNTER — VIRTUAL VISIT (OUTPATIENT)
Dept: RHEUMATOLOGY | Facility: CLINIC | Age: 55
End: 2020-07-09
Attending: PHYSICIAN ASSISTANT
Payer: COMMERCIAL

## 2020-07-09 ENCOUNTER — MYC REFILL (OUTPATIENT)
Dept: PALLIATIVE MEDICINE | Facility: CLINIC | Age: 55
End: 2020-07-09

## 2020-07-09 ENCOUNTER — MYC MEDICAL ADVICE (OUTPATIENT)
Dept: PALLIATIVE MEDICINE | Facility: CLINIC | Age: 55
End: 2020-07-09

## 2020-07-09 DIAGNOSIS — G89.4 CHRONIC PAIN SYNDROME: ICD-10-CM

## 2020-07-09 DIAGNOSIS — M06.9 RHEUMATOID ARTHRITIS INVOLVING MULTIPLE SITES, UNSPECIFIED RHEUMATOID FACTOR PRESENCE: Primary | ICD-10-CM

## 2020-07-09 DIAGNOSIS — M06.9 RHEUMATOID ARTHRITIS INVOLVING MULTIPLE JOINTS (H): ICD-10-CM

## 2020-07-09 PROCEDURE — 99204 OFFICE O/P NEW MOD 45 MIN: CPT | Mod: 95 | Performed by: STUDENT IN AN ORGANIZED HEALTH CARE EDUCATION/TRAINING PROGRAM

## 2020-07-09 RX ORDER — OXYCODONE HYDROCHLORIDE 5 MG/1
5 TABLET ORAL EVERY 6 HOURS PRN
Qty: 120 TABLET | Refills: 0 | Status: CANCELLED | OUTPATIENT
Start: 2020-07-09

## 2020-07-09 NOTE — TELEPHONE ENCOUNTER
Routed to the nursing pool and to the MA pool to process refill(s).    Cha Molina, RN-BSN  [facility]

## 2020-07-09 NOTE — TELEPHONE ENCOUNTER
Routing to provider to review medication prepped per below      Oxycodone 5 mg, #120, Refill: No  Sig:  Max of 4 tabs/day.  30 days for chronic pain. Okay to dispense and start 07/16/2020  Last picked up 06/16/2020 with start on 06/16/2020  Due 07/16/2020    Per last OV note 06/10/2020: Short-script of oxycodone 5mg tabs max 4/day for flare    Follow up scheduled 09/22/2020    Ellen Solis RN  Care Coordinator  [facility]

## 2020-07-09 NOTE — TELEPHONE ENCOUNTER
Received Curetishart message from patient requesting refill(s) for oxyCODONE (ROXICODONE) 5 MG tablet      Last dispensed from pharmacy on 6/16/2020    Pt last seen by prescribing provider on 6/10/2020  Next appt scheduled for 9/22/2020     checked in the past 6 months? YES If no, print current report and give to RN    Last urine drug screen date 1/21/2020  Current opioid agreement on file? Yes Date of opioid agreement: 1/21/2020    E-prescribe to:    [id number] - [place] - [address]    Will route to nursing pool for review and preparation of prescription(s).

## 2020-07-09 NOTE — TELEPHONE ENCOUNTER
Tariq message from patient on 7/9 at 0913:    Refills have been requested for the following medications:         oxyCODONE (ROXICODONE) 5 MG tablet [Susana Pike, JUANITA CNP]     Preferred pharmacy: [id number]  [place] - [address]   -------------    Will route to MA pool for assistance with gathering opioid refill information.    LETHA Parker, RN-BC  Patient Care Supervisor/Care Coordinator  [facility] [place]

## 2020-07-09 NOTE — PROGRESS NOTES
"Jailene Breen is a 54 year old male who is being evaluated via a billable video visit.      The patient has been notified of following:     \"This video visit will be conducted via a call between you and your physician/provider. We have found that certain health care needs can be provided without the need for an in-person physical exam.  This service lets us provide the care you need with a video conversation.  If a prescription is necessary we can send it directly to your pharmacy.  If lab work is needed we can place an order for that and you can then stop by our lab to have the test done at a later time.    Video visits are billed at different rates depending on your insurance coverage.  Please reach out to your insurance provider with any questions.    If during the course of the call the physician/provider feels a video visit is not appropriate, you will not be charged for this service.\"    Patient has given verbal consent for Video visit? Yes  How would you like to obtain your AVS? Shahram  Patient would like the video invitation sent by: Text to cell phone: [phone]  Will anyone else be joining your video visit? Charley Gan [facility]           Active Problem List:     Patient Active Problem List    Diagnosis Date Noted     Tobacco use disorder 06/19/2017     Priority: Medium     Ankylosing spondylitis (H) 03/01/2017     Priority: Medium     Syncope, unspecified syncope type 02/27/2017     Priority: Medium     JEFF (generalized anxiety disorder) 10/31/2016     Priority: Medium     Panic attack 10/31/2016     Priority: Medium     Right-sided thoracic back pain, unspecified chronicity 10/10/2016     Priority: Medium     Tear meniscus knee, right, initial encounter 09/15/2016     Priority: Medium     Rheumatoid arthritis involving multiple sites, unspecified rheumatoid factor presence (H) 09/15/2016     Priority: Medium     Back pain 11/17/2013     Priority: Medium     CARDIOVASCULAR SCREENING; LDL GOAL " LESS THAN 160 03/06/2012     Priority: Medium     Anxiety 03/06/2012     Priority: Medium     Overweight (BMI 25.0-29.9) 03/06/2012     Priority: Medium     Chronic pain 09/04/2011     Priority: Medium            History of Present Illness:   Jailene Breen is a 54 year old male with PMH of anxiety, obesity, HLA-B27 positive ankylosing spondylitis evaluated via a billable virtual visit in consultation at request of Dr Resendez for evaluation of joint pains.     He was diagnosed with seropositive rheumatoid arthritis in 2016, established care with Dr. Raygoza in 5/2016 and was started on methotrexate.  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 He was started on Humira in 12/2016 due to AS.  Discontinued methotrexate in 2/2017 due to lack of improvement.  Humira was changed to Enbrel in 4/2019 due to lack of improvement.    Today he reports that if he does not take his Enbrel he gets flareup of joint pains and lower back pain.  He has missed his Enbrel dose many times due to respiratory infections.  For the past month he is on it on a steady basis.  2 days before his Enbrel injection his neck starts hurting.  He is off of prednisone, last time he took 10 mg prednisone 2 weeks ago for 4 days.  He has lower back pain especially on the right side which radiates down to his leg.  Also reports pain in his hands, wrists, ankles along with morning stiffness.  He has trouble taking a deep breath for the last 2 years.  He also feels depressed.    No history of psoriasis, ulcerative colitis or chron's disease. No h/o iritis, enthesitis, finger or toe swelling like dactylitis, plantar fascitis or heel pain. Denies any raynauds, malar rash, photosensitivity, recurrent mouth/genital ulcers, sicca symptoms, pleuritic chest pains, recurrent sinusitis/rhinitis, swallowing difficulty, hearing  or visual changes recently.  No h/o arterial/venous thrombosis in the past. Denies any tick bite, recent GI/ infection.             Review of Systems:     Review Of Systems  Constitutional: denies fever, chills, night sweats and weight loss.  Skin: No skin rash.  Eyes: No dryness or irritation in eyes. No episode of eye inflammation or redness.   Ears/Nose/Throat: no recurrent sinus infections.  Respiratory: No shortness of breath, dyspnea on exertion, cough, or hemoptysis  Cardiovascular: no chest pain or palpitations.  Gastrointestinal: no nausea, vomiting, abdominal pain.  Normal bowel movements.  Genitourinary: no dysuria, frequency  or hematuria.  Musculoskeletal: as in HPI  Neurologic: no numbness, tingling.  Psychiatric: + mood disorders.  Hematologic/Lymphatic/Immunologic: no history of easy bruising, petechia or purpura.  No abnormal bleeding.   Endocrine: no h/o thyroid disease or Diabetes.                  Past Medical History:     Past Medical History:   Diagnosis Date     Ankylosing spondylitis (H) 3/1/2017     Anxiety      Arthritis     back, knees     Back pain     possible drug seeking behavior in the past.      Panic attacks      Past Surgical History:   Procedure Laterality Date     ARTHROSCOPY KNEE Right 9/22/2016    Procedure: ARTHROSCOPY KNEE;  Surgeon: Fredo Hughes MD;  Location: [facility] OR     INJECT PARAVERTEBRAL FACET JOINT LUMBAR / SACRAL FIRST Right 11/25/2016    Procedure: INJECT PARAVERTEBRAL FACET JOINT LUMBAR / SACRAL FIRST;  Surgeon: Doretha Magallanes MD;  Location:  OR     ORTHOPEDIC SURGERY      Rt knee X 2     ORTHOPEDIC SURGERY      Lt foot            Social History:     Social History     Occupational History     Not on file   Tobacco Use     Smoking status: Never Smoker     Smokeless tobacco: Former User     Types: Chew     Tobacco comment: Chew   Substance and Sexual Activity     Alcohol use: No     Comment: none     Drug use: No     Sexual activity: Yes      "Partners: Female     Comment: decreased with Prozac            Family History:     Family History   Problem Relation Age of Onset     Autoimmune Disease No family hx of             Allergies:     Allergies   Allergen Reactions     Hydrocodone Itching     Ms Contin [Morphine] Itching     Remeron Soltab Other (See Comments)     \"Blacked out\" for one week            Medications:     Current Outpatient Medications   Medication Sig Dispense Refill     albuterol (PROAIR HFA/PROVENTIL HFA/VENTOLIN HFA) 108 (90 Base) MCG/ACT inhaler Inhale 2 puffs into the lungs every 6 hours as needed for shortness of breath / dyspnea 1 Inhaler 0     atenolol (TENORMIN) 25 MG tablet Take 25 mg by mouth daily       cyclobenzaprine (FLEXERIL) 10 MG tablet Take 0.5-1 tablets (5-10 mg) by mouth 3 times daily as needed for muscle spasms Caution sedation 60 tablet 2     IBUPROFEN PO Take 800 mg by mouth every 6 hours as needed for moderate pain Reported on 5/19/2017       predniSONE (DELTASONE) 20 MG tablet Take 1 tablet (20 mg) by mouth daily 5 tablet 0     sertraline (ZOLOFT) 100 MG tablet Take 1 tablet (100 mg) by mouth daily 90 tablet 1     etanercept (ENBREL SURECLICK) 50 MG/ML autoinjector Inject 50 mg Subcutaneous once a week 1 kit 2     naloxone (NARCAN) 4 MG/0.1ML nasal spray Spray 1 spray (4 mg) into one nostril alternating nostrils as needed for opioid reversal every 2-3 minutes until assistance arrives (Patient not taking: Reported on 7/9/2020) 0.2 mL 0     nicotine (NICORETTE) 2 MG gum Place 2 each (4 mg) inside cheek as needed for smoking cessation 240 each 12     oxyCODONE (OXYCONTIN) 10 MG 12 hr tablet Take 1 tablet (10 mg) by mouth every 12 hours Fill 8/5/2020. Begin 8/72020 60 tablet 0     oxyCODONE (ROXICODONE) 5 MG tablet Take 1 tablet (5 mg) by mouth every 6 hours as needed for severe pain Max of 3 tabs/day.  Okay to dispense and start 07/16/2020 90 tablet 0            Physical Exam:   Vitals not taken.   Wt Readings from " Last 4 Encounters:   06/13/20 120.7 kg (266 lb)   01/21/20 125.2 kg (276 lb)   10/15/19 126.1 kg (278 lb)   08/30/19 123.8 kg (273 lb)       Constitutional: well-developed, appearing stated age; cooperative  MS: Tenderness over MCP, PIP, and bilateral wrists.  Tenderness over MTP joints.  Reports tenderness over the right lower back.  Psych: nl judgement, orientation, memory, affect.         Data:     No results found for any visits on 07/09/20.    Recent Labs   Lab Test 07/13/20  0853 02/21/17  0840 10/07/16  0948 05/13/16  1407 04/21/16  1520   WBC 5.4 9.8 9.6 17.0* 6.0   RBC 5.35 5.17 4.77 5.59 4.94   HGB 16.2 15.8 14.8 16.8 15.0   HCT 46.6 45.3 42.3 48.4 43.2   MCV 87 88 89 87 87   RDW 14.3 12.7 12.7 13.2 13.1    254 277 270 259   ALBUMIN 3.5  --  4.0  --  4.0   CRP <2.9  --   --  <2.9 <2.9   BUN  --  11 14  --  11      Recent Labs   Lab Test 09/30/16  1336   TSH 1.62     Hemoglobin   Date Value Ref Range Status   07/13/2020 16.2 13.3 - 17.7 g/dL Final   02/21/2017 15.8 13.3 - 17.7 g/dL Final   10/07/2016 14.8 13.3 - 17.7 g/dL Final     Urea Nitrogen   Date Value Ref Range Status   02/21/2017 11 7 - 30 mg/dL Final   10/07/2016 14 7 - 30 mg/dL Final   04/21/2016 11 7 - 30 mg/dL Final     Sed Rate   Date Value Ref Range Status   07/13/2020 9 0 - 20 mm/h Final   05/13/2016 8 0 - 15 mm/h Final     CRP Inflammation   Date Value Ref Range Status   07/13/2020 <2.9 0.0 - 8.0 mg/L Final   05/13/2016 <2.9 0.0 - 8.0 mg/L Final   04/21/2016 <2.9 0.0 - 8.0 mg/L Final     AST   Date Value Ref Range Status   07/13/2020 49 (H) 0 - 45 U/L Final   10/07/2016 15 0 - 45 U/L Final     Albumin   Date Value Ref Range Status   07/13/2020 3.5 3.4 - 5.0 g/dL Final   10/07/2016 4.0 3.4 - 5.0 g/dL Final   04/21/2016 4.0 3.4 - 5.0 g/dL Final     Alkaline Phosphatase   Date Value Ref Range Status   10/07/2016 80 40 - 150 U/L Final     ALT   Date Value Ref Range Status   07/13/2020 85 (H) 0 - 70 U/L Final   10/07/2016 25 0 - 70 U/L  Final     Rheumatoid Factor   Date Value Ref Range Status   04/21/2016 78 (H) <20 IU/mL Final     Recent Labs   Lab Test 07/13/20  0853 02/21/17  0840 10/07/16  0948 09/30/16  1336  04/21/16  1520   WBC 5.4 9.8 9.6  --    < > 6.0   HGB 16.2 15.8 14.8  --    < > 15.0   HCT 46.6 45.3 42.3  --    < > 43.2   MCV 87 88 89  --    < > 87    254 277  --    < > 259   BUN  --  11 14  --   --  11   TSH  --   --   --  1.62  --   --    AST 49*  --  15  --   --   --    ALT 85*  --  25  --   --   --    ALKPHOS  --   --  80  --   --   --     < > = values in this interval not displayed.       Other studies:   Component      Latest Ref Rng & Units 4/21/2016   LEOBARDO Screen by EIA      <1.0 <1.0 . . .   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 SI joint are most consistent with an inflammatory arthropathy. No  active edema or synovitis.    MRI L-spine revealing Disc bulging and slight shallow central disc protrusion at L2-3.  Right paracentral shallow protrusion at L5-S1 which may minimally  contact the right S1 nerve root.. Mild multilevel degenerative spondylosis.    QuantiFERON-TB gold negative in January 2020.  Vitamin D level-8.9-1/13/2020      X-rays of bilateral hands-January 2020-no erosion, chondrocalcinosis.  Joint spaces are anatomically aligned.  Minor degenerative changes at first CMC joint.    Blood tests-January 2020-normal CBC, CRP, creatinine, elevated AST/ALT.    Autoimmune work-up-May 3727-VAP-C84 positive, negative SSA/SSB, LEOBARDO    Reviewed Rheumatology lab flowsheet    Assessment    Seropositive rheumatoid arthritis: He has seropositive rheumatoid arthritis (+ RF, + anti-CCP ) manifesting as a polyarticular symmetric arthritis.  He has tried methotrexate in 2016 but discontinued after 6 to 8 months of use due  to no improvement.  He was on 6 tabs once a week.  He has also tried Humira and is on Enbrel now.  He does feel that Enbrel helps with overall pain but does not take it away completely.  He reports pain in his hands, wrists, ankles along with morning stiffness.    I discussed with him that addition of oral synthetic DMARD can be helpful.  I can restart him on methotrexate low-dose along with the Enbrel to help with peripheral arthritis.  He will need updated CBC, liver and kidney function tests before.  We will also get hepatitis B/C and TB QuantiFERON gold.    We will continue Enbrel 50 mg subcu once a week.    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  He has lumbar degenerative disc disease as well.  He follows up with pain clinic and has received multiple epidural spine injections but nothing helps him significantly.    I would consider to repeat MRI of his SI joint to evaluate ankylosing spondylitis.    Plan     Blood tests ordered      Continue Enbrel 50 mg subcutaneous once a week      If blood tests are normal then will start Methotrexate 4 tab once a week and increase by 1 tab every week to reach at 6 tab once a week dose.      RTC in 2 months virtually.     Video-Visit Details    Type of service:  Video Visit    Video Start Time: 10:10 AM  Video End Time: 11:00 AM    Originating Location (pt. Location): Home    Distant Location (provider location):  [facility]     Platform used for Video Visit: Mya Parada MD

## 2020-07-09 NOTE — PATIENT INSTRUCTIONS
Blood tests ordered       Continue Enbrel 50 mg subcutaneous once a week     If blood tests are normal then will start Methotrexate 4 tab once a week and increase by 1 tab every week to reach at 6 tab once a week dose.     RTC in 2 months virtually.

## 2020-07-10 RX ORDER — OXYCODONE HYDROCHLORIDE 5 MG/1
5 TABLET ORAL EVERY 6 HOURS PRN
Qty: 90 TABLET | Refills: 0 | Status: SHIPPED | OUTPATIENT
Start: 2020-07-10 | End: 2020-08-10

## 2020-07-10 NOTE — TELEPHONE ENCOUNTER
Signed Prescriptions:                        Disp   Refills    oxyCODONE (ROXICODONE) 5 MG tablet         90 tab*0        Sig: Take 1 tablet (5 mg) by mouth every 6 hours as needed           for severe pain Max of 3 tabs/day.  Okay to           dispense and start 07/16/2020  Authorizing Provider: SUSANA PETTY    This script is reduced to max of 3 tabs per day and #90 per month as per plan laid out for patient in phone call dated 6/15/2020.    Reviewed [place]  July 10, 2020- no concerning fills.    Susana Petty APRN, RN CNP, FNP  [facility]  [facility]

## 2020-07-13 DIAGNOSIS — M06.9 RHEUMATOID ARTHRITIS INVOLVING MULTIPLE SITES, UNSPECIFIED RHEUMATOID FACTOR PRESENCE: ICD-10-CM

## 2020-07-13 LAB
ALBUMIN SERPL-MCNC: 3.5 G/DL (ref 3.4–5)
ALT SERPL W P-5'-P-CCNC: 85 U/L (ref 0–70)
AST SERPL W P-5'-P-CCNC: 49 U/L (ref 0–45)
CREAT SERPL-MCNC: 0.96 MG/DL (ref 0.66–1.25)
CRP SERPL-MCNC: <2.9 MG/L (ref 0–8)
ERYTHROCYTE [DISTWIDTH] IN BLOOD BY AUTOMATED COUNT: 14.3 % (ref 10–15)
ERYTHROCYTE [SEDIMENTATION RATE] IN BLOOD BY WESTERGREN METHOD: 9 MM/H (ref 0–20)
GFR SERPL CREATININE-BSD FRML MDRD: 90 ML/MIN/{1.73_M2}
HCT VFR BLD AUTO: 46.6 % (ref 40–53)
HGB BLD-MCNC: 16.2 G/DL (ref 13.3–17.7)
MCH RBC QN AUTO: 30.3 PG (ref 26.5–33)
MCHC RBC AUTO-ENTMCNC: 34.8 G/DL (ref 31.5–36.5)
MCV RBC AUTO: 87 FL (ref 78–100)
PLATELET # BLD AUTO: 190 10E9/L (ref 150–450)
RBC # BLD AUTO: 5.35 10E12/L (ref 4.4–5.9)
WBC # BLD AUTO: 5.4 10E9/L (ref 4–11)

## 2020-07-13 PROCEDURE — 84450 TRANSFERASE (AST) (SGOT): CPT | Performed by: STUDENT IN AN ORGANIZED HEALTH CARE EDUCATION/TRAINING PROGRAM

## 2020-07-13 PROCEDURE — 86704 HEP B CORE ANTIBODY TOTAL: CPT | Performed by: STUDENT IN AN ORGANIZED HEALTH CARE EDUCATION/TRAINING PROGRAM

## 2020-07-13 PROCEDURE — 86803 HEPATITIS C AB TEST: CPT | Performed by: STUDENT IN AN ORGANIZED HEALTH CARE EDUCATION/TRAINING PROGRAM

## 2020-07-13 PROCEDURE — 84460 ALANINE AMINO (ALT) (SGPT): CPT | Performed by: STUDENT IN AN ORGANIZED HEALTH CARE EDUCATION/TRAINING PROGRAM

## 2020-07-13 PROCEDURE — 85027 COMPLETE CBC AUTOMATED: CPT | Performed by: STUDENT IN AN ORGANIZED HEALTH CARE EDUCATION/TRAINING PROGRAM

## 2020-07-13 PROCEDURE — 36415 COLL VENOUS BLD VENIPUNCTURE: CPT | Performed by: STUDENT IN AN ORGANIZED HEALTH CARE EDUCATION/TRAINING PROGRAM

## 2020-07-13 PROCEDURE — 87340 HEPATITIS B SURFACE AG IA: CPT | Performed by: STUDENT IN AN ORGANIZED HEALTH CARE EDUCATION/TRAINING PROGRAM

## 2020-07-13 PROCEDURE — 86140 C-REACTIVE PROTEIN: CPT | Performed by: STUDENT IN AN ORGANIZED HEALTH CARE EDUCATION/TRAINING PROGRAM

## 2020-07-13 PROCEDURE — 85652 RBC SED RATE AUTOMATED: CPT | Performed by: STUDENT IN AN ORGANIZED HEALTH CARE EDUCATION/TRAINING PROGRAM

## 2020-07-13 PROCEDURE — 82040 ASSAY OF SERUM ALBUMIN: CPT | Performed by: STUDENT IN AN ORGANIZED HEALTH CARE EDUCATION/TRAINING PROGRAM

## 2020-07-13 PROCEDURE — 82565 ASSAY OF CREATININE: CPT | Performed by: STUDENT IN AN ORGANIZED HEALTH CARE EDUCATION/TRAINING PROGRAM

## 2020-07-14 LAB
HBV CORE AB SERPL QL IA: NONREACTIVE
HBV SURFACE AG SERPL QL IA: NONREACTIVE
HCV AB SERPL QL IA: NONREACTIVE

## 2020-07-15 ENCOUNTER — MYC MEDICAL ADVICE (OUTPATIENT)
Dept: PALLIATIVE MEDICINE | Facility: CLINIC | Age: 55
End: 2020-07-15

## 2020-07-18 DIAGNOSIS — M06.9 RHEUMATOID ARTHRITIS INVOLVING MULTIPLE SITES, UNSPECIFIED RHEUMATOID FACTOR PRESENCE: ICD-10-CM

## 2020-07-20 ENCOUNTER — VIRTUAL VISIT (OUTPATIENT)
Dept: PALLIATIVE MEDICINE | Facility: CLINIC | Age: 55
End: 2020-07-20
Payer: COMMERCIAL

## 2020-07-20 DIAGNOSIS — M47.816 SPONDYLOSIS OF LUMBAR REGION WITHOUT MYELOPATHY OR RADICULOPATHY: ICD-10-CM

## 2020-07-20 DIAGNOSIS — M06.9 RHEUMATOID ARTHRITIS INVOLVING MULTIPLE JOINTS (H): Primary | ICD-10-CM

## 2020-07-20 DIAGNOSIS — M53.3 SACROILIAC JOINT PAIN: ICD-10-CM

## 2020-07-20 DIAGNOSIS — M51.369 DDD (DEGENERATIVE DISC DISEASE), LUMBAR: ICD-10-CM

## 2020-07-20 DIAGNOSIS — M45.7 ANKYLOSING SPONDYLITIS OF LUMBOSACRAL REGION (H): ICD-10-CM

## 2020-07-20 DIAGNOSIS — M54.59 LUMBAR FACET JOINT PAIN: ICD-10-CM

## 2020-07-20 DIAGNOSIS — M79.18 MYOFASCIAL PAIN: ICD-10-CM

## 2020-07-20 PROCEDURE — 96159 HLTH BHV IVNTJ INDIV EA ADDL: CPT | Mod: 95 | Performed by: PSYCHOLOGIST

## 2020-07-20 PROCEDURE — 96158 HLTH BHV IVNTJ INDIV 1ST 30: CPT | Mod: 95 | Performed by: PSYCHOLOGIST

## 2020-07-20 NOTE — PROGRESS NOTES
"Jailene Breen is a 54 year old male who is being evaluated via a billable video visit.      The patient has been notified of following:     \"This video visit will be conducted via a call between you and your physician/provider. We have found that certain health care needs can be provided without the need for an in-person physical exam.  This service lets us provide the care you need with a video conversation.  If a prescription is necessary we can send it directly to your pharmacy.  If lab work is needed we can place an order for that and you can then stop by our lab to have the test done at a later time.    Video visits are billed at different rates depending on your insurance coverage.  Please reach out to your insurance provider with any questions.    If during the course of the call the physician/provider feels a video visit is not appropriate, you will not be charged for this service.\"    Patient has given verbal consent for Video visit? Yes    Patient would like the video invitation sent by: Text to cell phone: [phone]}    Video Start Time: 9:00 AM    Additional provider notes:      Pain Diagnoses per pain provider:   Multiple joint pain       Ankylosing spondylitis of lumbosacral region (H)       Rheumatoid arthritis involving multiple joints (H)       Lumbar facet joint pain       Sacroiliac joint pain       Myofascial pain       Spondylosis of lumbar region without myelopathy or radiculopathy       DDD (degenerative disc disease), lumbar             DATA: Patient reports his pain is still 'there pretty good.' Saw rheumatologist who is making some medication changes. 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 Activity level is about the same. Stress level is mildly increased - finances, course schedule selection. Sleep hygiene is still variable. Patient reports engaging in self-care for his pain 2-3 times per day. Using his TENS unit, hot baths, taking more frequent breaks " as often as he needs it, walking his dogs. Reports he and his wife got into an argument a few weeks ago which led him to ask her if she wanted him to kill himself and 'get it over with.' States police were called, he was brought to ER and had a safety plan created with them. He reports he was not planning to kill himself and reports he did not have a plan either. Discussed some methods to manage his distress, including talking to his wife. 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 Continuing to work with attorneys to discuss next steps regarding disability.    ASSESSMENT: Easily engaged and readily utilizes skills discussed in sessions. Patient presents as more stable, less anxious overall - he attributes this to obtaining 8+ hours of uninterrupted sleep the previous evening.  Progress toward goals: good.    Pain Status: worsened and unchanged    Emotional Status: remained stable              Medication / chemical use concerns:  none    PLAN:   Next Appointment: Jailene Breen's next appointment is scheduled for 7/27 at 8:00 am.  Assignment/Objectives /interventions for next session: Continue to focus on self-soothing, use hobbies to manage distress and talk to his wife about his concerns as needed.    Video-Visit Details    Type of service:  Video Visit    Video End Time (time video stopped): 9:45 am    Originating Location (pt. Location): Home    Distant Location (provider location):  [facility]     Mode of Communication:  Video Conference via Jameel Farrell PsyD LP  Licensed Psychologist  Outpatient Clinic Therapist  [facility] '

## 2020-07-21 NOTE — TELEPHONE ENCOUNTER
Requested Prescriptions   Pending Prescriptions Disp Refills     etanercept (ENBREL SURECLICK) 50 MG/ML autoinjector 1 kit 2     Sig: Inject 50 mg Subcutaneous once a week       There is no refill protocol information for this order        Routing refill request to provider for review/approval because:  Drug not on the [facility] refill protocol         Chantel Staley RN, BSN, PHN

## 2020-07-22 ENCOUNTER — TELEPHONE (OUTPATIENT)
Dept: FAMILY MEDICINE | Facility: CLINIC | Age: 55
End: 2020-07-22

## 2020-07-22 DIAGNOSIS — M06.9 RHEUMATOID ARTHRITIS INVOLVING MULTIPLE SITES, UNSPECIFIED RHEUMATOID FACTOR PRESENCE: ICD-10-CM

## 2020-07-22 NOTE — TELEPHONE ENCOUNTER
Reason for Call:  Other prescription    Detailed comments: Pharmacy states that this prescription was not received and they are looking to get it sent today so that it may be sent out to pt. Thank you.    etanercept (ENBREL SURECLICK) 50 MG/ML autoinjector    Pharmacy attached.    Phone Number Patient can be reached at: Other phone number:  [phone]    Best Time: Any    Can we leave a detailed message on this number? Not Applicable    Call taken on 7/22/2020 at 10:20 AM by Cindy Patel

## 2020-07-29 ENCOUNTER — MYC MEDICAL ADVICE (OUTPATIENT)
Dept: PALLIATIVE MEDICINE | Facility: CLINIC | Age: 55
End: 2020-07-29

## 2020-07-29 DIAGNOSIS — M25.562 CHRONIC PAIN OF BOTH KNEES: ICD-10-CM

## 2020-07-29 DIAGNOSIS — M25.561 CHRONIC PAIN OF BOTH KNEES: ICD-10-CM

## 2020-07-29 DIAGNOSIS — M53.3 SACROILIAC JOINT PAIN: ICD-10-CM

## 2020-07-29 DIAGNOSIS — F17.200 SMOKING ADDICTION: ICD-10-CM

## 2020-07-29 DIAGNOSIS — M45.7 ANKYLOSING SPONDYLITIS OF LUMBOSACRAL REGION (H): ICD-10-CM

## 2020-07-29 DIAGNOSIS — M54.59 LUMBAR FACET JOINT PAIN: ICD-10-CM

## 2020-07-29 DIAGNOSIS — M51.369 DDD (DEGENERATIVE DISC DISEASE), LUMBAR: ICD-10-CM

## 2020-07-29 DIAGNOSIS — G89.29 CHRONIC PAIN OF BOTH KNEES: ICD-10-CM

## 2020-07-29 DIAGNOSIS — G89.4 CHRONIC PAIN SYNDROME: ICD-10-CM

## 2020-07-29 DIAGNOSIS — M79.18 MYOFASCIAL PAIN: ICD-10-CM

## 2020-07-29 DIAGNOSIS — M47.816 SPONDYLOSIS OF LUMBAR REGION WITHOUT MYELOPATHY OR RADICULOPATHY: ICD-10-CM

## 2020-07-29 RX ORDER — OXYCODONE HCL 10 MG/1
10 TABLET, FILM COATED, EXTENDED RELEASE ORAL EVERY 12 HOURS
Qty: 60 TABLET | Refills: 0 | Status: SHIPPED | OUTPATIENT
Start: 2020-07-29 | End: 2020-08-27

## 2020-07-29 NOTE — TELEPHONE ENCOUNTER
Received call from patient requesting refill(s) of oxyCODONE (OXYCONTIN) 10 MG 12 hr tablet    Last dispensed from pharmacy on 07/06/20    Pt last seen by prescribing provider on 06/10/20-virtual visit  Next appt scheduled for 09/22/20 office visit, does have injection visit prior     checked in the past 6 months? Yes If no, print current report and give to RN    Last urine drug screen date 01/21/20  Current opioid agreement on file (completed within the last year) Yes Date of opioid agreement: 01/21/20    E-prescribe to pharmacy[id number] - [place], [place] - [address]     Will route to nursing [place] for review and preparation of prescription(s).

## 2020-07-29 NOTE — TELEPHONE ENCOUNTER
Routed to the nursing pool and to the MA pool to process refill(s).    Cha Molina, RN-BSN  [facility]

## 2020-07-29 NOTE — TELEPHONE ENCOUNTER
Signed Prescriptions:                        Disp   Refills    oxyCODONE (OXYCONTIN) 10 MG 12 hr tablet   60 tab*0        Sig: Take 1 tablet (10 mg) by mouth every 12 hours Fill           8/5/2020. Begin 8/72020  Authorizing Provider: SUSANA PETTY    Reviewed [place]  July 29, 2020- no concerning fills.    Susana GRANT, RN CNP, FNP  [facility]  [facility]

## 2020-07-29 NOTE — TELEPHONE ENCOUNTER
Medication refill information reviewed.     Last due:  Fill 7/6/2020. Begin 7/8/2020     Due date:  8/7/20    Weaning instructions:  N/A    Prescriptions prepped for review.     Cha Molina RN-BSN  [facility]

## 2020-07-30 NOTE — TELEPHONE ENCOUNTER
Patient is chewing 2 pieces of gum each time (4 mg). Patient likes to chew 2 pieces. One is too small to chew. Please send new Rx with updated directions.            Augustin Lombardi  [facility]

## 2020-08-06 ENCOUNTER — TELEPHONE (OUTPATIENT)
Dept: PSYCHIATRY | Facility: CLINIC | Age: 55
End: 2020-08-06

## 2020-08-06 NOTE — TELEPHONE ENCOUNTER
PSYCHIATRY CLINIC PHONE INTAKE     SERVICES REQUESTED / INTERESTED IN          Individual Psychotherapy     Presenting Problem and Brief History                              What would you like to be seen for? (brief description):  Pt was diagnosed about 5 years. Medications on file are up todate, but pt also takes Klonipin 1mg as needed for anxiety. Pt used to get panic attacks often but now it's once a month, sometimes. It's hard to fall asleep and stay asleep. He gets maybe 4 hours. Pt would like to work on coping with his depression.     Have you received a mental health diagnosis? Yes   Which one (s): Depression  Is there any history of developmental delay?  No   Are you currently seeing a mental health provider?  Yes            Who / month last seen:  Susana Pike, referent, hsa been handling medications. See chart.   Do you have mental health records elsewhere?  Yes. Pt has records at [facility]  Will you sign a release so we can obtain them?  Yes    Have you ever been hospitalized for psychiatric reasons?  No  Describe:  NA    Do you have current thoughts of self-harm?  Yes  - Pt has thoughts but no plan or intent.   Do you currently have thoughts of harming others?  No       Substance Use History     Do you have any history of alcohol / illicit drug use?  Yes  Describe:  Alcohol. Pt has been sober for 5-6 years.   Have you ever received treatment for this?  Yes    Describe:  unsure     Social History     Who is the patient's a guardian?  No    Name / number: NA  Have you had an ACT team in last 12 months?  No  Describe: NA   Do you have any current or past legal issues?  Yes  Describe: Pt has had issues in the past but his charges are now resolved,   OK to leave a detailed voicemail?  Yes    Medical/ Surgical History                                   Patient Active Problem List   Diagnosis     CARDIOVASCULAR SCREENING; LDL GOAL LESS THAN 160     Anxiety     Overweight (BMI 25.0-29.9)     Back pain     Tear  meniscus knee, right, initial encounter     Rheumatoid arthritis involving multiple sites, unspecified rheumatoid factor presence (H)     Chronic pain     Right-sided thoracic back pain, unspecified chronicity     JEFF (generalized anxiety disorder)     Panic attack     Syncope, unspecified syncope type     Ankylosing spondylitis (H)     Tobacco use disorder          Medications             Current Outpatient Medications   Medication Sig Dispense Refill     albuterol (PROAIR HFA/PROVENTIL HFA/VENTOLIN HFA) 108 (90 Base) MCG/ACT inhaler Inhale 2 puffs into the lungs every 6 hours as needed for shortness of breath / dyspnea 1 Inhaler 0     atenolol (TENORMIN) 25 MG tablet Take 25 mg by mouth daily       cyclobenzaprine (FLEXERIL) 10 MG tablet Take 0.5-1 tablets (5-10 mg) by mouth 3 times daily as needed for muscle spasms Caution sedation 60 tablet 2     etanercept (ENBREL SURECLICK) 50 MG/ML autoinjector Inject 50 mg Subcutaneous once a week 1 kit 2     IBUPROFEN PO Take 800 mg by mouth every 6 hours as needed for moderate pain Reported on 5/19/2017       naloxone (NARCAN) 4 MG/0.1ML nasal spray Spray 1 spray (4 mg) into one nostril alternating nostrils as needed for opioid reversal every 2-3 minutes until assistance arrives (Patient not taking: Reported on 7/9/2020) 0.2 mL 0     nicotine (NICORETTE) 2 MG gum Place 2 each (4 mg) inside cheek as needed for smoking cessation 240 each 12     oxyCODONE (OXYCONTIN) 10 MG 12 hr tablet Take 1 tablet (10 mg) by mouth every 12 hours Fill 8/5/2020. Begin 8/72020 60 tablet 0     oxyCODONE (ROXICODONE) 5 MG tablet Take 1 tablet (5 mg) by mouth every 6 hours as needed for severe pain Max of 3 tabs/day.  Okay to dispense and start 07/16/2020 90 tablet 0     predniSONE (DELTASONE) 20 MG tablet Take 1 tablet (20 mg) by mouth daily 5 tablet 0     sertraline (ZOLOFT) 100 MG tablet Take 1 tablet (100 mg) by mouth daily 90 tablet 1         DISPOSITION      8/6/20 Intake complete. Pt  prefers female provider. Adding to therapy [facility].     Ericka Luu,

## 2020-08-09 ENCOUNTER — TELEPHONE (OUTPATIENT)
Dept: RHEUMATOLOGY | Facility: CLINIC | Age: 55
End: 2020-08-09

## 2020-08-09 DIAGNOSIS — M06.9 RHEUMATOID ARTHRITIS INVOLVING MULTIPLE SITES, UNSPECIFIED RHEUMATOID FACTOR PRESENCE: Primary | ICD-10-CM

## 2020-08-09 RX ORDER — FOLIC ACID 1 MG/1
1 TABLET ORAL DAILY
Qty: 90 TABLET | Refills: 3 | Status: SHIPPED | OUTPATIENT
Start: 2020-08-09 | End: 2020-12-17

## 2020-08-10 ENCOUNTER — MYC MEDICAL ADVICE (OUTPATIENT)
Dept: PALLIATIVE MEDICINE | Facility: CLINIC | Age: 55
End: 2020-08-10

## 2020-08-10 DIAGNOSIS — M06.9 RHEUMATOID ARTHRITIS INVOLVING MULTIPLE JOINTS (H): ICD-10-CM

## 2020-08-10 DIAGNOSIS — G89.4 CHRONIC PAIN SYNDROME: ICD-10-CM

## 2020-08-10 NOTE — RESULT ENCOUNTER NOTE
Lopez Dnet,     Your blood tests have revealed mildly elevated liver enzymes.  Normal  CBC, kidney test and negative hepatitis B and C results.  Due to mildly elevated liver enzymes we may have to be careful after starting methotrexate.  If there will be worsening in the liver enzymes then will discontinue methotrexate.  On methotrexate will recommend not to take more than 1000 mg of Tylenol a day as needed or 2 drinks of alcohol per week.    Raghu Parada MD

## 2020-08-10 NOTE — TELEPHONE ENCOUNTER
Tariq message from patient on 8/10 at 0704:    Good morning,  could you please refill my oxycodone and send to [facility] pharmacy.  Thank you and have a great day.  ---------    Will route to MA pool for assistance with gathering opioid refill information.    KERLINE ParkerN, RN-BC  Patient Care Supervisor/Care Coordinator  [facility] [place]

## 2020-08-10 NOTE — TELEPHONE ENCOUNTER
Received [employer] message from patient requesting refill(s) for oxyCODONE (ROXICODONE) 5 MG tablet      Last dispensed from pharmacy on 7/16/2020    Pt last seen by prescribing provider on 6/10/2020  Next appt scheduled for 9/22/2020     checked in the past 6 months? YES If no, print current report and give to RN    Last urine drug screen date 1/21/2020  Current opioid agreement on file? Yes Date of opioid agreement: 1/21/2020    E-prescribe to:    [id number] [place] - [address]    Will route to nursing pool for review and preparation of prescription(s).

## 2020-08-11 ENCOUNTER — TELEPHONE (OUTPATIENT)
Dept: RHEUMATOLOGY | Facility: CLINIC | Age: 55
End: 2020-08-11

## 2020-08-11 NOTE — TELEPHONE ENCOUNTER
Called patient and left vm asking them to call the clinic for non-urgent test results. Clinic number provided.     KERLINE WrayN, RN   Rheumatology Care Coordinator   [facility]        ----- Message from Raghu Parada MD sent at 8/9/2020  7:37 PM CDT -----  Lopez Dent,     Your blood tests have revealed mildly elevated liver enzymes.  Normal  CBC, kidney test and negative hepatitis B and C results.  Due to mildly elevated liver enzymes we may have to be careful after starting methotrexate.  If there will be worsening in the liver enzymes then will discontinue methotrexate.  On methotrexate will recommend not to take more than 1000 mg of Tylenol a day as needed or 2 drinks of alcohol per week.    Raghu Parada MD

## 2020-08-13 ENCOUNTER — MYC REFILL (OUTPATIENT)
Dept: PALLIATIVE MEDICINE | Facility: CLINIC | Age: 55
End: 2020-08-13

## 2020-08-13 DIAGNOSIS — G89.4 CHRONIC PAIN SYNDROME: ICD-10-CM

## 2020-08-13 DIAGNOSIS — M06.9 RHEUMATOID ARTHRITIS INVOLVING MULTIPLE JOINTS (H): ICD-10-CM

## 2020-08-13 RX ORDER — OXYCODONE HYDROCHLORIDE 5 MG/1
5 TABLET ORAL EVERY 6 HOURS PRN
Qty: 90 TABLET | Refills: 0 | Status: CANCELLED | OUTPATIENT
Start: 2020-08-13

## 2020-08-13 RX ORDER — OXYCODONE HYDROCHLORIDE 5 MG/1
5 TABLET ORAL EVERY 6 HOURS PRN
Qty: 90 TABLET | Refills: 0 | Status: SHIPPED | OUTPATIENT
Start: 2020-08-13 | End: 2020-09-08

## 2020-08-13 NOTE — TELEPHONE ENCOUNTER
Script Eprescribed to pharmacy    Will send this to MA team to notify patient.    Signed Prescriptions:                        Disp   Refills    oxyCODONE (ROXICODONE) 5 MG tablet         90 tab*0        Sig: Take 1 tablet (5 mg) by mouth every 6 hours as needed           for severe pain Max of 3 tabs/day.  Okay to           dispense 8/13/20 and start 8/15/2020  Authorizing Provider: MARKIE NOE MD  [facility]

## 2020-08-13 NOTE — TELEPHONE ENCOUNTER
Received  Mychart message  from patient requesting refill(s) of  oxyCODONE (ROXICODONE) 5 MG tablet      Last picked up from pharmacy on 07/17/20    Pt last seen by prescribing provider on 06/10/20    Next appt scheduled for 09/22/20       checked in the past 6 months? Yes If no, print current report and give to RN    Last urine drug screen date 01/21/20  Current opioid agreement on file (completed within the last year) Yes Date of opioid agreement: 01/21/20    Processing (pick one and delete the others):      E-prescribe to     [id number] - [place] - [address]  [address] [address]  Phone: [phone] Fax: [phone]    Will route to nursing pool for review and preparation of prescription(s).    Sherron Jennings [facility]  [place]

## 2020-08-13 NOTE — TELEPHONE ENCOUNTER
Duplicate encounter.  See the 8/10/20 encounter.    Cha Molina, RN-BSN  [facility]

## 2020-08-17 NOTE — TELEPHONE ENCOUNTER
Confirmed patient reviewed [facility].     KERLINE WrayN, RN   Rheumatology Care Coordinator   [facility]

## 2020-08-24 ENCOUNTER — MYC MEDICAL ADVICE (OUTPATIENT)
Dept: PALLIATIVE MEDICINE | Facility: CLINIC | Age: 55
End: 2020-08-24

## 2020-08-26 ENCOUNTER — ANCILLARY PROCEDURE (OUTPATIENT)
Dept: RADIOLOGY | Facility: CLINIC | Age: 55
End: 2020-08-26
Attending: PSYCHIATRY & NEUROLOGY
Payer: COMMERCIAL

## 2020-08-26 ENCOUNTER — RADIOLOGY INJECTION OFFICE VISIT (OUTPATIENT)
Dept: PALLIATIVE MEDICINE | Facility: CLINIC | Age: 55
End: 2020-08-26
Payer: COMMERCIAL

## 2020-08-26 ENCOUNTER — MYC MEDICAL ADVICE (OUTPATIENT)
Dept: PALLIATIVE MEDICINE | Facility: CLINIC | Age: 55
End: 2020-08-26

## 2020-08-26 VITALS — SYSTOLIC BLOOD PRESSURE: 126 MMHG | HEART RATE: 66 BPM | OXYGEN SATURATION: 97 % | DIASTOLIC BLOOD PRESSURE: 94 MMHG

## 2020-08-26 DIAGNOSIS — M47.816 SPONDYLOSIS OF LUMBAR REGION WITHOUT MYELOPATHY OR RADICULOPATHY: ICD-10-CM

## 2020-08-26 DIAGNOSIS — M54.59 LUMBAR FACET JOINT PAIN: ICD-10-CM

## 2020-08-26 DIAGNOSIS — M25.561 CHRONIC PAIN OF BOTH KNEES: ICD-10-CM

## 2020-08-26 DIAGNOSIS — M45.7 ANKYLOSING SPONDYLITIS OF LUMBOSACRAL REGION (H): ICD-10-CM

## 2020-08-26 DIAGNOSIS — G89.29 CHRONIC PAIN OF BOTH KNEES: ICD-10-CM

## 2020-08-26 DIAGNOSIS — G89.4 CHRONIC PAIN SYNDROME: ICD-10-CM

## 2020-08-26 DIAGNOSIS — M47.819 FACET ARTHROPATHY: Primary | ICD-10-CM

## 2020-08-26 DIAGNOSIS — M53.3 SACROILIAC JOINT PAIN: ICD-10-CM

## 2020-08-26 DIAGNOSIS — M25.562 CHRONIC PAIN OF BOTH KNEES: ICD-10-CM

## 2020-08-26 DIAGNOSIS — M51.369 DDD (DEGENERATIVE DISC DISEASE), LUMBAR: ICD-10-CM

## 2020-08-26 DIAGNOSIS — M79.18 MYOFASCIAL PAIN: ICD-10-CM

## 2020-08-26 PROCEDURE — 64494 INJ PARAVERT F JNT L/S 2 LEV: CPT | Mod: LT | Performed by: PSYCHIATRY & NEUROLOGY

## 2020-08-26 PROCEDURE — 64493 INJ PARAVERT F JNT L/S 1 LEV: CPT | Mod: LT | Performed by: PSYCHIATRY & NEUROLOGY

## 2020-08-26 ASSESSMENT — PAIN SCALES - GENERAL: PAINLEVEL: WORST PAIN (10)

## 2020-08-26 NOTE — NURSING NOTE
Discharge Information    IV Discontiued Time:  NA    Amount of Fluid Infused:  NA    Discharge Criteria = When patient returns to baseline or as per MD order    Consciousness:  Pt is fully awake    Circulation:  BP +/- 20% of pre-procedure level    Respiration:  Patient is able to breathe deeply    O2 Sat:  Patient is able to maintain O2 Sat >92% on room air    Activity:  Moves 4 extremities on command    Ambulation:  Patient is able to stand and walk or stand and pivot into wheelchair    Dressing:  Clean/dry or No Dressing    Notes:   Discharge instructions and AVS given to patient    Patient meets criteria for discharge?  YES    Admitted to PCU?  No    Responsible adult present to accompany patient home?  Yes    Signature/Title:    dariel kennedy RN  RN Care Coordinator  [facility] [place]

## 2020-08-26 NOTE — PATIENT INSTRUCTIONS
Your insurance will be checked for a repeat Right-sided lumbar radiofrequency ablation. The next radiofrequency ablation can be bilateral lumbar radiofrequency ablation if these medial branch blocks are helpful for you.    Call the appointment line to schedule your physical therapy: [phone].      [facility]   Medial Branch Block Discharge Instructions      Left-sided medial branch block #1.    Your procedure was performed by: Dr. Ashlyn Gamble    Medications used include:  Lidocaine  Bupivicaine  Omnipaque      You will need to complete the Pain Scale Log form and return it to us as soon as possible.  Once we have received the form, we will review it and call you to determine the next steps.     The form can be faxed to [phone] or mailed to:   [facility] [place]   [address]   [place] [address]      You may resume your regular medications    If you were holding your blood thinning medication, please restart taking it: N/A    You may resume your regular activities    Be cautious with walking as numbness and/or weakness in the lower extremities may occur for up to 6-8 hours due to the effects of the anesthetic.    Avoid driving for 6 hours. The local anesthetic could slow your reflexes.     You may shower, however no swimming or tub baths or hot tubs for 24 hours following your procedure.    Your pain will return after the numbing medications have worn off.  You may use your current pain medications as needed.    Unless you have been directed to avoid the use of anti-inflammatory medications (NSAIDS), you may use medications such as ibuprofen, Aleve or Tylenol for pain control if needed.  Some people find it helpful to alternate ibuprofen and Tylenol every 3 hours for a couple of days.    You may use ice packs 10-15 minutes three to four times a day at the injection site for comfort.     Do not use heat to painful areas for 6 to 8 hours. This will  give the local anesthetic time to wear off and prevent you from accidentally burning your skin.     If you experience any of the following, call the Pain Clinic during work hours (Monday through Friday 8 am-4:30 pm) at [phone] or the Provider Line after hours at [phone]:  -Fever over 100 degree F  -Swelling, bleeding, redness, drainage, warmth at the injection site  -Progressive weakness or numbness in your legs or arms  -If lumbar, call if you have a loss of bowel or bladder function  -If cervical, call if you have any unusual headache that is not relieved by Tylenol  -Unusual new onset of pain that is not improving

## 2020-08-26 NOTE — TELEPHONE ENCOUNTER
Routed to the nursing pool and to the MA pool to process refill(s).    Cha Molina, RN-BSN  [facility]

## 2020-08-26 NOTE — PROGRESS NOTES
Pre procedure Diagnosis: facet arthropathy   Post procedure Diagnosis: Same  Procedure performed: left L3-5 medial branch blocks #1  Anesthesia: none  Complications: none.  Vessel left L5, consider using contrast for radiofrequency ablation   Operators: Ashlyn Gamble MD and Girish Lee MD     Indications:   Jailene Breen is a 54 year old male was sent by Susana Pike NP for left medial branch block.  They have a history of rheumatoid arthritis, and chronic low back pain.  He has previously had radiofrequency ablation on the right with good success.  His low back pain is on the left as well.  He would like to repeat the right radiofrequency ablation when it gets time to do this left side.  Had 60% pain erlief. Exam shows pain with extension/rotation bilaterally and they have tried conservative treatment including meds/PT.      Physical therapy last done:  Has done in past, no recent.    Options/alternatives, benefits and risks were discussed with the patient including bleeding, infection, tissue trauma, exposure to radiation, reaction to medications, spinal cord injury, weakness, numbness and paralysis.  Questions were answered to his satisfaction and he agrees to proceed. Voluntary informed consent was obtained and signed.     Vitals were reviewed: Yes  Allergies were reviewed:  Yes   Medications were reviewed:  Yes   Pre-procedure pain score: 10/10    Procedure:  After getting informed consent, patient was brought into the procedure suite and was placed in a prone position on the procedure table.   A Pause for the Cause was performed.  Patient was prepped and draped in sterile fashion.     Under AP fluoroscopic guidance the L3, L4, L5 vertebral bodies were identified. The C-arm was rotated to the oblique view to afford optimal visualization the pedicles.  Lidocaine 1% was used to anesthetize the skin at each level.  Under intermittent fluoroscopy, 22G 5inch Quinke spinal needles were positioned inferior and  lateral to the intersection of the transverse process and pedicle at the Left L4 & L5 levels, as well as the corresponding sacral alar notch. The needle positions were verified and optimized from the AP view.    The anatomic targets for the L3 & L4 medial nerve and L5 dorsal ramus (which functionally incorporates the medial branch) were the  L4 & L5 transverse processes and sacral alar notch, with laterality as described above, resulting in blockade of the L4/5 and L5/S1 facet joints.    Omnipaque-300 was injected, and appropriate spread of contrast was noted without vascular uptake. Initial placement at L5 showed vascular uptake, needle moved.  A total of 1.5ml of Omnipaque was used.  8.5ml was wasted. Bupivacaine 0.5% 0.5 ml was injected at each location.  The needles were removed.      Hemostasis was achieved, the area was cleaned, and bandaids were placed when appropriate.  The patient tolerated the procedure well, and was taken to the recovery room.    Images were saved to PACS.    The patient will continue to monitor progress, and they were given a pain diary to complete at home.  They will either fax or mail this back to us or bring it to their next appointment. We will determine the treatment plan after we review the diary.      Post-procedure pain score: 5/10  Follow-up includes:   -f/u phone call in one week  -will await diary for further planning.    Ashlyn Gamble MD  [facility]

## 2020-08-27 RX ORDER — OXYCODONE HCL 10 MG/1
10 TABLET, FILM COATED, EXTENDED RELEASE ORAL EVERY 12 HOURS
Qty: 60 TABLET | Refills: 0 | Status: SHIPPED | OUTPATIENT
Start: 2020-08-27 | End: 2020-09-29

## 2020-08-27 NOTE — TELEPHONE ENCOUNTER
Called pt and notified of refill available for  on 9/4/2020, begin 9/6/2020.    Nicolasa Krueger MA

## 2020-08-27 NOTE — TELEPHONE ENCOUNTER
Medication refill information reviewed.     Due date for oxyCODONE (OXYCONTIN) 10 MG 12 hr tablet is 9/6/20     Prescriptions prepped for review.     Will route to provider.     Josias Lopez RN  Care Coordinator   [facility] [place]

## 2020-08-27 NOTE — TELEPHONE ENCOUNTER
Patient requesting refill(s) of oxyCODONE (OXYCONTIN) 10 MG 12 hr tablet     Last dispensed from pharmacy on 8/5/2020    Pt last seen by prescribing provider on 6/10/2020  Next appt scheduled for 9/22/2020     checked in the past 6 months? Yes If no, print current report and give to RN    Last urine drug screen date 01/21/2020  Current opioid agreement on file (completed within the last year) Yes Date of opioid agreement: 01/21/2020    E-prescribe to [id number] [place]  [facility]acy    Will route to nursing [place] for review and preparation of prescription(s).

## 2020-08-27 NOTE — TELEPHONE ENCOUNTER
Signed Prescriptions:                        Disp   Refills    oxyCODONE (OXYCONTIN) 10 MG 12 hr tablet   60 tab*0        Sig: Take 1 tablet (10 mg) by mouth every 12 hours Fill           9/4/2020. Begin 9/6/2020  Authorizing Provider: SUSANA PETTY    Reviewed [place]  August 27, 2020- no concerning fills.    Susana GRANT, RN CNP, FNP  [facility]  [facility]

## 2020-08-28 DIAGNOSIS — F41.1 GAD (GENERALIZED ANXIETY DISORDER): ICD-10-CM

## 2020-08-31 RX ORDER — CLONAZEPAM 1 MG/1
TABLET ORAL
Qty: 30 TABLET | Refills: 1 | Status: SHIPPED | OUTPATIENT
Start: 2020-08-31 | End: 2020-11-23

## 2020-08-31 NOTE — TELEPHONE ENCOUNTER
Requested Prescriptions   Pending Prescriptions Disp Refills     clonazePAM (KLONOPIN) 1 MG tablet [Pharmacy Med Name: CLONAZEPAM 1MG TABS] 30 tablet 1     Sig: TAKE 1/2-1 TABLETS BY MOUTH ONCE DAILY AS NEEDED FOR ANXIETY       There is no refill protocol information for this order        Routing refill request to provider for review/approval because:  Drug not on the [facility] refill protocol   Drug not active on patient's medication list        Chantel Staley RN, BSN, PHN

## 2020-09-02 ENCOUNTER — VIRTUAL VISIT (OUTPATIENT)
Dept: PALLIATIVE MEDICINE | Facility: CLINIC | Age: 55
End: 2020-09-02
Payer: COMMERCIAL

## 2020-09-02 DIAGNOSIS — M53.3 SACROILIAC JOINT PAIN: ICD-10-CM

## 2020-09-02 DIAGNOSIS — M79.18 MYOFASCIAL PAIN: ICD-10-CM

## 2020-09-02 DIAGNOSIS — M54.59 LUMBAR FACET JOINT PAIN: ICD-10-CM

## 2020-09-02 DIAGNOSIS — M25.50 MULTIPLE JOINT PAIN: Primary | ICD-10-CM

## 2020-09-02 DIAGNOSIS — M45.7 ANKYLOSING SPONDYLITIS OF LUMBOSACRAL REGION (H): ICD-10-CM

## 2020-09-02 DIAGNOSIS — M06.9 RHEUMATOID ARTHRITIS INVOLVING MULTIPLE JOINTS (H): ICD-10-CM

## 2020-09-02 DIAGNOSIS — M47.816 SPONDYLOSIS OF LUMBAR REGION WITHOUT MYELOPATHY OR RADICULOPATHY: ICD-10-CM

## 2020-09-02 DIAGNOSIS — M51.369 DDD (DEGENERATIVE DISC DISEASE), LUMBAR: ICD-10-CM

## 2020-09-02 PROCEDURE — 96159 HLTH BHV IVNTJ INDIV EA ADDL: CPT | Performed by: PSYCHOLOGIST

## 2020-09-02 PROCEDURE — 96158 HLTH BHV IVNTJ INDIV 1ST 30: CPT | Performed by: PSYCHOLOGIST

## 2020-09-02 NOTE — PROGRESS NOTES
"Jailene Breen is a 54 year old male who is being evaluated via a billable video visit.      The patient has been notified of following:     \"This video visit will be conducted via a call between you and your physician/provider. We have found that certain health care needs can be provided without the need for an in-person physical exam.  This service lets us provide the care you need with a video conversation.  If a prescription is necessary we can send it directly to your pharmacy.  If lab work is needed we can place an order for that and you can then stop by our lab to have the test done at a later time.    Video visits are billed at different rates depending on your insurance coverage.  Please reach out to your insurance provider with any questions.    If during the course of the call the physician/provider feels a video visit is not appropriate, you will not be charged for this service.\"    Patient has given verbal consent for Video visit? Yes    Patient would like the video invitation sent by: Text to cell phone: .[phone]}    Video Start Time: 10:00 AM    Additional provider notes:      Pain Diagnoses per pain provider:   Multiple joint pain       Ankylosing spondylitis of lumbosacral region (H)       Rheumatoid arthritis involving multiple joints (H)       Lumbar facet joint pain       Sacroiliac joint pain       Myofascial pain       Spondylosis of lumbar region without myelopathy or radiculopathy       DDD (degenerative disc disease), lumbar                 DATA: Discussed attendance, and how this interferes in providing treatment consistently. Patient reports his pain is about the same - reports his pain on average 8/10. Patient's reports he has still been struggling with anxiety, believes starting classes has been helpful. Of note, he repots he stopped Zoloft a few weeks ago, states it was not providing benefit - states he was continuing to have major mood swings. Activity level is mildly increased - he has " been walking his dogs about 1/2 mile every other day. Stress level is about the same - has finalizing medical bankruptcy process. Sleep hygiene is still quite poor, although does state it is mildly improved since we began working together. Patient reports engaging in self-care for his pain 2-3 times per day. Has started family therapy and is working on communication skills. Has started with new rheumatologist, has restarted Methyltrexate again. Has intake with psychiatrist this week. Acknowledges that not being able to work and other stressors play a significant role in his mood. He is still awaiting an appeal to denial for SSDI. He has been taking Clonazepam about once a week.    ASSESSMENT: Easily engaged. Patient's anxiety continues to be a primary concern, and he has appropriately scheduled psychiatry intake to discuss possible medications to better manage his anxiety.   Progress toward goals: fair.    Pain Status: worsened and unchanged    Emotional Status: worsened and unchanged              Medication / chemical use concerns:  Patient stopped Zoloft on his own a few weeks ago.     PLAN:   Next Appointment: Jailene Breen's next appointment is scheduled for 9/25 at 12:30 PM.  Assignment/Objectives /interventions for next session: Continue to     Video-Visit Details    Type of service:  Video Visit    Video End Time (time video stopped): 10:54 AM      Originating Location (pt. Location): Home    Distant Location (provider location):  [facility]     Mode of Communication:  Video Conference via Jameel Farrell PsyD LP  Licensed Psychologist  Outpatient Clinic Therapist  [facility]

## 2020-09-04 ENCOUNTER — VIRTUAL VISIT (OUTPATIENT)
Dept: PSYCHIATRY | Facility: CLINIC | Age: 55
End: 2020-09-04
Attending: NURSE PRACTITIONER
Payer: COMMERCIAL

## 2020-09-04 DIAGNOSIS — Z71.9 ENCOUNTER FOR COUNSELING: Primary | ICD-10-CM

## 2020-09-04 ASSESSMENT — PAIN SCALES - GENERAL: PAINLEVEL: EXTREME PAIN (8)

## 2020-09-04 NOTE — PROGRESS NOTES
"VIDEO VISIT  Jailene Breen is a 54 year old patient who is being evaluated via a billable video visit.      The patient has been notified of following:   \"This video visit will be conducted via a call between you and your physician/provider. We have found that certain health care needs can be provided without the need for an in-person physical exam. This service lets us provide the care you need with a video conversation. If a prescription is necessary we can send it directly to your pharmacy. If lab work is needed we can place an order for that and you can then stop by our lab to have the test done at a later time. Insurers are generally covering virtual visits as they would in-office visits so billing should not be different than normal.  If for some reason you do get billed incorrectly, you should contact the billing office to correct it and that number is in the AVS .    Video Conference to be completed via:  Kip    Patient has given verbal consent for video visit?:  Yes    Patient would prefer that any video invitations be sent by: Send to e-mail at: [email or web address]     I was not present for the session. I reviewed the case and the note and I agree with the plan. Barbie Christie, PHD, LP      How would patient like to obtain AVS?:  [employer]    AVS SmartPhrase [PsychAVS] has been placed in 'Patient Instructions':  Yes    "

## 2020-09-04 NOTE — PATIENT INSTRUCTIONS
Thank you for coming to the PSYCHIATRY CLINIC.    Lab Testing:  If you had lab testing today and your results are reassuring or normal they will be mailed to you or sent through [employer] within 7 days. If the lab tests need quick action we will call you with the results. The phone number we will call with results is # [phone] (home) . If this is not the best number please call our clinic and change the number.    Medication Refills:  If you need any refills please call your pharmacy and they will contact us. Our fax number for refills is [phone]. Please allow three business for refill processing. If you need to  your refill at a new pharmacy, please contact the new pharmacy directly. The new pharmacy will help you get your medications transferred.     Scheduling:  If you have any concerns about today's visit or wish to schedule another appointment please call our office during normal business hours [phone] (8-5:00 M-F)    Contact Us:  Please call [phone] during business hours (8-5:00 M-F).  If after clinic hours, or on the weekend, please call  [phone].    Financial Assistance [phone]  [facility] Billing [phone]  Central Billing Office, [facility]: [phone]  [place] Billing [phone]  Medical Records [phone]      MENTAL HEALTH CRISIS NUMBERS:  For a medical emergency please call  911 or go to the nearest ER.     [facility]:   [facility] -[phone]   Crisis Residence [facility] -[phone]   Walk-In Counseling [facility] -[phone]   COPE 24/7 [place] Mobile Team -[phone] (adults)/[phone] (child)  CHILD: [facility] needs assessment team - [phone]      [facility]:   [facility] - [phone]   Walk-in counseling [facility] - [phone]   Walk-in counseling [facility] - [phone]   Crisis Residence [facility] - [phone]  Urgent  [facility] Adult Mental [place]-[phone] mobile unit/ 24/7 crisis line    National Crisis Numbers:   National Suicide Prevention Lifeline: 0-234-679-TALK (441-590-5085)  Poison Control Center - 2-933-556-6352  [employer]/resources for a list of additional resources (SOS)  Trans Lifeline a hotline for transgender people 6-929-105-6901  The Tu Project a hotline for LGBT youth 1-586.329.1678  Crisis Text Line: For any crisis 24/7   To: 958496  see www.crisistextline.org  - IF MAKING A CALL FEELS TOO HARD, send a text!         Again thank you for choosing PSYCHIATRY CLINIC and please let us know how we can best partner with you to improve you and your family's health.    You may be receiving a survey regarding this appointment. We would love to have your feedback, both positive and negative. The survey is done by an external company, so your answers are anonymous.

## 2020-09-04 NOTE — PROGRESS NOTES
"Needs Assessment:     Start time: 1:00 PM End time: 1:40 PM     Brief history per chart review:   Patient is following with the pain clinic for chronic pain related to ankylosing spondylitis, rheumatoid arthritis and lumbar spondylosis. He has been seeing one of the pain clinic psychologists (Jonna Farrell PsyD, LP) for approximately monthly therapy appointments. Per psychiatry phone intake, patient was seeking psychotherapy for depression however per Dr. Farrell's notes the plan was \"Patient's anxiety continues to be a primary concern, and he has appropriately scheduled psychiatry intake to discuss possible medications to better manage his anxiety.\" Patient recently (early August) discontinued Zoloft because \"it was not providing benefit - states he was continuing to have major mood swings.\" He is also prescribed clonazepam and reported taking this approximately 1x/week ( indicates 2 refills for 30 day supplies within the last year).     Patient reports he also needs to be more formally diagnosed as part of his disability claim.     Chief complaint:   Per patient: \"I've been going through a lot stuff lately and I've definitely been depressed want to figure out what kind of depression medications would work for me. I was on Zoloft and that didn't seemed like it did anything for me. I ended up in the ER because I felt like killing myself, something I wouldn't actually follow through on but the feeling was there. I'm seeing a pain psychologist for the pain I'm in. I'm just going through a lot of things right now and I'm trying to do whatever it takes to get my mood back to where it's supposed to be.\"    States he would be interested in both medication management and psychotherapy. \"I don't think medications alone will do it. I know that some of it is situational, I've been dealing with 's trying to get disability. I can't work because of the pain I'm in. My family is suffering finacially. I was the lead " "income provider in the family.\"     Social:   -States he is also attending college right now after not being able to work. 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     SI history:   Past suicide attempts: denies   Past/Current SI: States in 2017 he was in the ER because \"I was really thinking about killing myself then.\" Had a plan at that time to drive to the [employer] store and get drunk enough to have the courage to do something. I don't think I could do it sober. I don't want to die and I don't want to do that to my family.\" He states that he subsequently went back to school and that has helped him a lot.     States currently he does not have any SI thoughts or intent to kill himself. Being back in school helps him feel like his worth increased. Denies any SI since being in the ER last time (June 30th 2020). Denies any past suicide attempts. States \"I Just don't feel very good about myself at all.\" States \"I don't know what triggers it really.\" I think that was \"more of a cry for help then me actually wanting to kill myself.\"     Medication history:   Zoloft was started by an Urgent Care doctor 2017 and it was being prescribed by his PCP since then. \"I wouldn't see him very much, if I needed a refill I'd just sent it via [employer].\" States he stopped it on his own after going to the ER in June because he felt like it wasn't helping him. States he doesn't feel any different after stopping taking it.     Past Diagnoses:   Does not know of formal past diagnoses - pain psychologist cannot give diagnoses - also needs to see a psychologist for diagnoses related to disability claim      Past therapy:   - Following with a psychologist through the [facility] - Jonna Farrell PsyD, LP - last visit were on 9/2/20 & " 7/20/20 - with next appt on 9/25/20. Has followed with some other psychologists with the pain clinic in the past.    - per chart review, recently started family therapy - doing this with someone who was recommended by their . Seeing someone once every 2 weeks. Feels it has been helpful.     Substance Use:   Denies any recreational use of substances, has not drank for approximately 5 years. He also started on methotrexate recently and he can't drink when taking it.       Plan:   - set up formal AGE new evaluation intake appointment with next available provider   - will refer to psychotherapy, writer will discuss with psychotherapy supervisor regarding recommended psychotherapy modality       Psychotherapy supervision provided by Mervat James PhD and Barbie Christie PhD. Note will be signed by Barbie Christie, PhD.

## 2020-09-08 ENCOUNTER — MYC MEDICAL ADVICE (OUTPATIENT)
Dept: PALLIATIVE MEDICINE | Facility: CLINIC | Age: 55
End: 2020-09-08

## 2020-09-08 ENCOUNTER — MYC REFILL (OUTPATIENT)
Dept: PALLIATIVE MEDICINE | Facility: CLINIC | Age: 55
End: 2020-09-08

## 2020-09-08 DIAGNOSIS — M06.9 RHEUMATOID ARTHRITIS INVOLVING MULTIPLE JOINTS (H): ICD-10-CM

## 2020-09-08 DIAGNOSIS — G89.4 CHRONIC PAIN SYNDROME: ICD-10-CM

## 2020-09-08 RX ORDER — OXYCODONE HYDROCHLORIDE 5 MG/1
5 TABLET ORAL EVERY 6 HOURS PRN
Qty: 90 TABLET | Refills: 0 | Status: CANCELLED | OUTPATIENT
Start: 2020-09-08

## 2020-09-08 NOTE — TELEPHONE ENCOUNTER
Medication refill information reviewed.     Due date for oxyCODONE (ROXICODONE) 5 MG tablet is 9/14/20     Prescriptions prepped for review.     Will route to provider.     Josias Lopez, RN  Care Coordinator   [facility]

## 2020-09-08 NOTE — TELEPHONE ENCOUNTER
Received call from patient requesting refill(s) of oxyCODONE (ROXICODONE) 5 MG tablet     Last dispensed from pharmacy on 08/13/20    Pt last seen by prescribing provider on 06/10/20  Next appt scheduled for 09/22/20     checked in the past 6 months? Yes If no, print current report and give to RN    Last urine drug screen date 01/21/20  Current opioid agreement on file (completed within the last year) Yes Date of opioid agreement: 01/21/20    E-prescribe to   [id number] - [place] - [address]  [address] [place] [address]  Phone: [phone] Fax: [phone]      Will route to nursing [place] for review and preparation of prescription(s).       Joy Kessler MA  [facility]

## 2020-09-08 NOTE — TELEPHONE ENCOUNTER
This request is being managed in a separate encounter.    KERLINE RivasN, RN  Care Coordinator  [facility] [place]

## 2020-09-09 ENCOUNTER — NURSE TRIAGE (OUTPATIENT)
Dept: NURSING | Facility: CLINIC | Age: 55
End: 2020-09-09

## 2020-09-09 RX ORDER — OXYCODONE HYDROCHLORIDE 5 MG/1
5 TABLET ORAL EVERY 6 HOURS PRN
Qty: 90 TABLET | Refills: 0 | Status: SHIPPED | OUTPATIENT
Start: 2020-09-09 | End: 2020-10-06

## 2020-09-09 NOTE — TELEPHONE ENCOUNTER
Tariq message sent to patient informing him of medication refill.   Gail Crane CMA on 9/9/2020 at 11:21 AM

## 2020-09-09 NOTE — TELEPHONE ENCOUNTER
Signed Prescriptions:                        Disp   Refills    oxyCODONE (ROXICODONE) 5 MG tablet         90 tab*0        Sig: Take 1 tablet (5 mg) by mouth every 6 hours as needed           for severe pain Max of 3 tabs/day.  Okay to           dispense 9/12/20 and start 9/14/2020  Authorizing Provider: SUSANA PETTY    Reviewed [place]  September 9, 2020- no concerning fills.    Susana GRANT, RN CNP, FNP  [facility]  [facility]       Head atraumatic, normal cephalic shape.

## 2020-09-10 ENCOUNTER — ANCILLARY PROCEDURE (OUTPATIENT)
Dept: GENERAL RADIOLOGY | Facility: CLINIC | Age: 55
End: 2020-09-10
Attending: PHYSICIAN ASSISTANT
Payer: COMMERCIAL

## 2020-09-10 ENCOUNTER — OFFICE VISIT (OUTPATIENT)
Dept: URGENT CARE | Facility: URGENT CARE | Age: 55
End: 2020-09-10
Payer: COMMERCIAL

## 2020-09-10 VITALS
TEMPERATURE: 98.8 F | SYSTOLIC BLOOD PRESSURE: 138 MMHG | HEART RATE: 65 BPM | OXYGEN SATURATION: 98 % | DIASTOLIC BLOOD PRESSURE: 89 MMHG

## 2020-09-10 DIAGNOSIS — Z79.899 HIGH RISK MEDICATION USE: ICD-10-CM

## 2020-09-10 DIAGNOSIS — R68.83 CHILLS: ICD-10-CM

## 2020-09-10 DIAGNOSIS — Z87.39 H/O RHEUMATOID ARTHRITIS: ICD-10-CM

## 2020-09-10 DIAGNOSIS — J22 LOWER RESPIRATORY INFECTION: Primary | ICD-10-CM

## 2020-09-10 DIAGNOSIS — M06.9 RHEUMATOID ARTHRITIS INVOLVING MULTIPLE SITES, UNSPECIFIED RHEUMATOID FACTOR PRESENCE: ICD-10-CM

## 2020-09-10 DIAGNOSIS — R05.9 COUGH: ICD-10-CM

## 2020-09-10 LAB
ALBUMIN SERPL-MCNC: 3.3 G/DL (ref 3.4–5)
ALT SERPL W P-5'-P-CCNC: 50 U/L (ref 0–70)
AST SERPL W P-5'-P-CCNC: 27 U/L (ref 0–45)
BASOPHILS # BLD AUTO: 0 10E9/L (ref 0–0.2)
BASOPHILS NFR BLD AUTO: 0.6 %
CREAT SERPL-MCNC: 0.97 MG/DL (ref 0.66–1.25)
CRP SERPL-MCNC: <2.9 MG/L (ref 0–8)
DEPRECATED S PYO AG THROAT QL EIA: NEGATIVE
DIFFERENTIAL METHOD BLD: NORMAL
EOSINOPHIL # BLD AUTO: 0.3 10E9/L (ref 0–0.7)
EOSINOPHIL NFR BLD AUTO: 4.2 %
ERYTHROCYTE [DISTWIDTH] IN BLOOD BY AUTOMATED COUNT: 13 % (ref 10–15)
ERYTHROCYTE [SEDIMENTATION RATE] IN BLOOD BY WESTERGREN METHOD: 12 MM/H (ref 0–20)
GFR SERPL CREATININE-BSD FRML MDRD: 89 ML/MIN/{1.73_M2}
HCT VFR BLD AUTO: 45.1 % (ref 40–53)
HGB BLD-MCNC: 16 G/DL (ref 13.3–17.7)
LYMPHOCYTES # BLD AUTO: 1.5 10E9/L (ref 0.8–5.3)
LYMPHOCYTES NFR BLD AUTO: 24 %
MCH RBC QN AUTO: 30.4 PG (ref 26.5–33)
MCHC RBC AUTO-ENTMCNC: 35.5 G/DL (ref 31.5–36.5)
MCV RBC AUTO: 86 FL (ref 78–100)
MONOCYTES # BLD AUTO: 0.6 10E9/L (ref 0–1.3)
MONOCYTES NFR BLD AUTO: 9.4 %
NEUTROPHILS # BLD AUTO: 3.8 10E9/L (ref 1.6–8.3)
NEUTROPHILS NFR BLD AUTO: 61.8 %
PLATELET # BLD AUTO: 208 10E9/L (ref 150–450)
RBC # BLD AUTO: 5.26 10E12/L (ref 4.4–5.9)
SPECIMEN SOURCE: NORMAL
SPECIMEN SOURCE: NORMAL
STREP GROUP A PCR: NOT DETECTED
WBC # BLD AUTO: 6.2 10E9/L (ref 4–11)

## 2020-09-10 PROCEDURE — 82565 ASSAY OF CREATININE: CPT | Performed by: STUDENT IN AN ORGANIZED HEALTH CARE EDUCATION/TRAINING PROGRAM

## 2020-09-10 PROCEDURE — 85025 COMPLETE CBC W/AUTO DIFF WBC: CPT | Performed by: PHYSICIAN ASSISTANT

## 2020-09-10 PROCEDURE — 84450 TRANSFERASE (AST) (SGOT): CPT | Performed by: STUDENT IN AN ORGANIZED HEALTH CARE EDUCATION/TRAINING PROGRAM

## 2020-09-10 PROCEDURE — 40001204 ZZHCL STATISTIC STREP A RAPID: Performed by: PHYSICIAN ASSISTANT

## 2020-09-10 PROCEDURE — 36415 COLL VENOUS BLD VENIPUNCTURE: CPT | Performed by: STUDENT IN AN ORGANIZED HEALTH CARE EDUCATION/TRAINING PROGRAM

## 2020-09-10 PROCEDURE — 82040 ASSAY OF SERUM ALBUMIN: CPT | Performed by: STUDENT IN AN ORGANIZED HEALTH CARE EDUCATION/TRAINING PROGRAM

## 2020-09-10 PROCEDURE — 87651 STREP A DNA AMP PROBE: CPT | Performed by: PHYSICIAN ASSISTANT

## 2020-09-10 PROCEDURE — U0003 INFECTIOUS AGENT DETECTION BY NUCLEIC ACID (DNA OR RNA); SEVERE ACUTE RESPIRATORY SYNDROME CORONAVIRUS 2 (SARS-COV-2) (CORONAVIRUS DISEASE [COVID-19]), AMPLIFIED PROBE TECHNIQUE, MAKING USE OF HIGH THROUGHPUT TECHNOLOGIES AS DESCRIBED BY CMS-2020-01-R: HCPCS | Performed by: PHYSICIAN ASSISTANT

## 2020-09-10 PROCEDURE — 71046 X-RAY EXAM CHEST 2 VIEWS: CPT

## 2020-09-10 PROCEDURE — 99214 OFFICE O/P EST MOD 30 MIN: CPT | Performed by: PHYSICIAN ASSISTANT

## 2020-09-10 PROCEDURE — 85652 RBC SED RATE AUTOMATED: CPT | Performed by: STUDENT IN AN ORGANIZED HEALTH CARE EDUCATION/TRAINING PROGRAM

## 2020-09-10 PROCEDURE — 84460 ALANINE AMINO (ALT) (SGPT): CPT | Performed by: STUDENT IN AN ORGANIZED HEALTH CARE EDUCATION/TRAINING PROGRAM

## 2020-09-10 PROCEDURE — 86140 C-REACTIVE PROTEIN: CPT | Performed by: STUDENT IN AN ORGANIZED HEALTH CARE EDUCATION/TRAINING PROGRAM

## 2020-09-10 RX ORDER — DOXYCYCLINE HYCLATE 100 MG
100 TABLET ORAL 2 TIMES DAILY
Qty: 20 TABLET | Refills: 0 | Status: SHIPPED | OUTPATIENT
Start: 2020-09-10 | End: 2020-09-20

## 2020-09-10 NOTE — TELEPHONE ENCOUNTER
Developed a cold on Sunday night - occasionally breaks out into a sweat - has no way to check his temperature. Feeling drained with no energy. His rheumatologist wants him to get checked out - covid test and CXR.    Per protocol advised PCP evaluation - warm transferred to scheduling. Patient doesn't have established PCP - could  schedule for virtual  visit. Patient declines - states he will just go to  tomorrow.    Libby Bhardwaj RN on 9/9/2020 at 7:50 PM      Reason for Disposition    HIGH RISK patient (e.g., age > 64 years, diabetes, heart or lung disease, weak immune system) (Exception: Has already been evaluated by healthcare provider and has no new or worsening symptoms)    Additional Information    Negative: SEVERE difficulty breathing (e.g., struggling for each breath, speaks in single words)    Negative: Difficult to awaken or acting confused (e.g., disoriented, slurred speech)    Negative: Bluish (or gray) lips or face now    Negative: Shock suspected (e.g., cold/pale/clammy skin, too weak to stand, low BP, rapid pulse)    Negative: Sounds like a life-threatening emergency to the triager    Negative: [1] COVID-19 exposure AND [2] no symptoms    Negative: COVID-19 and Breastfeeding, questions about    Negative: [1] Adult with possible COVID-19 symptoms AND [2] triager concerned about severity of symptoms or other causes    Negative: SEVERE or constant chest pain or pressure (Exception: mild central chest pain, present only when coughing)    Negative: MODERATE difficulty breathing (e.g., speaks in phrases, SOB even at rest, pulse 100-120)    Negative: Patient sounds very sick or weak to the triager    Negative: MILD difficulty breathing (e.g., minimal/no SOB at rest, SOB with walking, pulse <100)    Negative: Chest pain or pressure    Negative: Fever > 103 F (39.4 C)    Negative: [1] Fever > 101 F (38.3 C) AND [2] age > 60    Negative: [1] Fever > 100.0 F (37.8 C) AND [2] bedridden (e.g., nursing home  patient, CVA, chronic illness, recovering from surgery)    Protocols used: CORONAVIRUS (COVID-19) DIAGNOSED OR [id number] 8.4.20

## 2020-09-10 NOTE — LETTER
September 14, 2020      Jailene Breen  [address]   [place] [address]        Dear ,    We are writing to inform you of your test results.    Your blood test came back normal.  Liver enzymes which were elevated have normalized.  If you are on antibiotics for lower respiratory infection then hold Enbrel and methotrexate until you are done with antibiotics and then you can restart.     Resulted Orders   Erythrocyte sedimentation rate auto   Result Value Ref Range    Sed Rate 12 0 - 20 mm/h   CRP inflammation   Result Value Ref Range    CRP Inflammation <2.9 0.0 - 8.0 mg/L   Creatinine   Result Value Ref Range    Creatinine 0.97 0.66 - 1.25 mg/dL    GFR Estimate 89 >60 mL/min/[1.73_m2]      Comment:      Non  GFR Calc  Starting 12/18/2018, serum creatinine based estimated GFR (eGFR) will be   calculated using the Chronic Kidney Disease Epidemiology Collaboration   (CKD-EPI) equation.      GFR Estimate If Black >90 >60 mL/min/[1.73_m2]      Comment:       GFR Calc  Starting 12/18/2018, serum creatinine based estimated GFR (eGFR) will be   calculated using the Chronic Kidney Disease Epidemiology Collaboration   (CKD-EPI) equation.     Albumin level   Result Value Ref Range    Albumin 3.3 (L) 3.4 - 5.0 g/dL   ALT   Result Value Ref Range    ALT 50 0 - 70 U/L   AST   Result Value Ref Range    AST 27 0 - 45 U/L       If you have any questions or concerns, please call the clinic at the number listed above.       Sincerely,        Raghu Parada MD

## 2020-09-10 NOTE — PROGRESS NOTES
"S: 54-year-old male presents for cough, hot sweats, chills, fatigue, chest congestion for about 3 days.  Started with a sore throat which is now better.  No nausea or vomiting.  No rash.  No diarrhea.  No decreased taste or smell.  He is on Enbrel for rheumatoid arthritis.    Allergies   Allergen Reactions     Hydrocodone Itching     Ms Contin [Morphine] Itching     Remeron Soltab Other (See Comments)     \"Blacked out\" for one week       Past Medical History:   Diagnosis Date     Ankylosing spondylitis (H) 3/1/2017     Anxiety      Arthritis     back, knees     Back pain     possible drug seeking behavior in the past.      Panic attacks        albuterol (PROAIR HFA/PROVENTIL HFA/VENTOLIN HFA) 108 (90 Base) MCG/ACT inhaler, Inhale 2 puffs into the lungs every 6 hours as needed for shortness of breath / dyspnea  atenolol (TENORMIN) 25 MG tablet, Take 25 mg by mouth daily  clonazePAM (KLONOPIN) 1 MG tablet, TAKE 1/2-1 TABLETS BY MOUTH ONCE DAILY AS NEEDED FOR ANXIETY  cyclobenzaprine (FLEXERIL) 10 MG tablet, Take 0.5-1 tablets (5-10 mg) by mouth 3 times daily as needed for muscle spasms Caution sedation  etanercept (ENBREL SURECLICK) 50 MG/ML autoinjector, Inject 50 mg Subcutaneous once a week  folic acid (FOLVITE) 1 MG tablet, Take 1 tablet (1 mg) by mouth daily  IBUPROFEN PO, Take 800 mg by mouth every 6 hours as needed for moderate pain Reported on 5/19/2017  methotrexate 2.5 MG tablet, Take 4 tablets (10 mg) by mouth every 7 days Labs every 8 - 12 weeks for refills.  nicotine (NICORETTE) 2 MG gum, Place 2 each (4 mg) inside cheek as needed for smoking cessation  oxyCODONE (OXYCONTIN) 10 MG 12 hr tablet, Take 1 tablet (10 mg) by mouth every 12 hours Fill 9/4/2020. Begin 9/6/2020  oxyCODONE (ROXICODONE) 5 MG tablet, Take 1 tablet (5 mg) by mouth every 6 hours as needed for severe pain Max of 3 tabs/day.  Okay to dispense 9/12/20 and start 9/14/2020  predniSONE (DELTASONE) 20 MG tablet, Take 1 tablet (20 mg) by mouth " daily  sertraline (ZOLOFT) 100 MG tablet, Take 1 tablet (100 mg) by mouth daily  naloxone (NARCAN) 4 MG/0.1ML nasal spray, Spray 1 spray (4 mg) into one nostril alternating nostrils as needed for opioid reversal every 2-3 minutes until assistance arrives (Patient not taking: Reported on 7/9/2020)    fentaNYL (PF) (SUBLIMAZE) injection 12.5-50 mcg        Social History     Tobacco Use     Smoking status: Never Smoker     Smokeless tobacco: Former User     Types: Chew     Tobacco comment: Chew   Substance Use Topics     Alcohol use: No     Comment: none       ROS:  CONSTITUTIONAL: Negative for fatigue or fever.  EYES: Negative for eye problems.  ENT: As above.  RESP: As above.  CV: Negative for chest pains.  GI: Negative for vomiting.  MUSCULOSKELETAL:  Negative for significant muscle or joint pains.  NEUROLOGIC: Negative for headaches.  SKIN: Negative for rash.  PSYCH: Normal mentation for age.    OBJECTIVE:  /89   Pulse 65   Temp 98.8  F (37.1  C)   SpO2 98%   GENERAL APPEARANCE: Healthy, alert and no distress.  EYES:Conjunctiva/sclera clear.  EARS: No cerumen.   Ear canals w/o erythema.  TM's intact w/o erythema.    NOSE/MOUTH: Nose without ulcers, erythema or lesions.  SINUSES: No maxillary sinus tenderness.  THROAT: No erythema w/o tonsillar enlargement . No exudates.  NECK: Supple, nontender, no lymphadenopathy.  RESP: I do hear some fine crackles left base.    CV: Regular rate and rhythm, normal S1 S2, no murmur noted.  NEURO: Awake, alert    SKIN: No rashes  Results for orders placed or performed in visit on 09/10/20   XR Chest 2 Views     Status: None    Narrative    CHEST TWO VIEWS  9/10/2020 12:40 PM     HISTORY: 54-year-old patient with chills and cough. History of  rheumatoid arthritis.       Impression    IMPRESSION: Since February 21, 2017, heart size is normal. No pleural  effusion, pneumothorax, or abnormal area of consolidation.    SAMSON AREVALO MD   Results for orders placed or performed  in visit on 09/10/20   Erythrocyte sedimentation rate auto     Status: None   Result Value Ref Range    Sed Rate 12 0 - 20 mm/h   Results for orders placed or performed in visit on 09/10/20   CBC with platelets and differential     Status: None   Result Value Ref Range    WBC 6.2 4.0 - 11.0 10e9/L    RBC Count 5.26 4.4 - 5.9 10e12/L    Hemoglobin 16.0 13.3 - 17.7 g/dL    Hematocrit 45.1 40.0 - 53.0 %    MCV 86 78 - 100 fl    MCH 30.4 26.5 - 33.0 pg    MCHC 35.5 31.5 - 36.5 g/dL    RDW 13.0 10.0 - 15.0 %    Platelet Count 208 150 - 450 10e9/L    % Neutrophils 61.8 %    % Lymphocytes 24.0 %    % Monocytes 9.4 %    % Eosinophils 4.2 %    % Basophils 0.6 %    Absolute Neutrophil 3.8 1.6 - 8.3 10e9/L    Absolute Lymphocytes 1.5 0.8 - 5.3 10e9/L    Absolute Monocytes 0.6 0.0 - 1.3 10e9/L    Absolute Eosinophils 0.3 0.0 - 0.7 10e9/L    Absolute Basophils 0.0 0.0 - 0.2 10e9/L    Diff Method Automated Method    Streptococcus A Rapid Scr w Reflx to PCR     Status: None    Specimen: Throat   Result Value Ref Range    Strep Specimen Description Throat     Streptococcus Group A Rapid Screen Negative NEG^Negative           ASSESSMENT:     ICD-10-CM    1. Lower respiratory infection  J22 doxycycline hyclate (VIBRA-TABS) 100 MG tablet   2. Cough  R05 Streptococcus A Rapid Scr w Reflx to PCR     Symptomatic COVID-19 Virus (Coronavirus) by PCR     CBC with platelets and differential     XR Chest 2 Views     doxycycline hyclate (VIBRA-TABS) 100 MG tablet   3. Chills  R68.83 Streptococcus A Rapid Scr w Reflx to PCR     Symptomatic COVID-19 Virus (Coronavirus) by PCR     CBC with platelets and differential     XR Chest 2 Views     Group A Streptococcus PCR Throat Swab     doxycycline hyclate (VIBRA-TABS) 100 MG tablet   4. H/O rheumatoid arthritis  Z87.39 Streptococcus A Rapid Scr w Reflx to PCR     Symptomatic COVID-19 Virus (Coronavirus) by PCR     CBC with platelets and differential     XR Chest 2 Views     doxycycline hyclate  (VIBRA-TABS) 100 MG tablet           PLAN: COVID test is pending.  I did hear some fine crackles left base.  Will start doxycycline.  Contact rheumatologist.  I have discussed clinical findings with patient.  Side effects of medications discussed.  Symptomatic care is discussed.  I have discussed the possibility of  worsening symptoms and indication to RTC or go to the ER if they occur.  All questions are answered, patient indicates understanding of these issues and is in agreement with plan.   Patient care instructions are discussed/given at the end of visit.   Lots of rest and fluids.    Gris Coulter PA-C

## 2020-09-11 LAB
SARS-COV-2 RNA SPEC QL NAA+PROBE: NOT DETECTED
SPECIMEN SOURCE: NORMAL

## 2020-09-11 NOTE — RESULT ENCOUNTER NOTE
Lopez Swift,     Your blood test came back normal.  Liver enzymes which were elevated have normalized.  If you are on antibiotics for lower respiratory infection then hold Enbrel and methotrexate until you are done with antibiotics and then you can restart.    Raghu Parada MD

## 2020-09-17 NOTE — PATIENT INSTRUCTIONS
Nice to meet you today Les. As we discussed:   - Start escitalopram 10mg daily, let us know if you are experiencing significant side effects and we can start at a lower dose.  - Start hydroxyzine 25mg daily as needed for anxiety and panic attacks, if taking it more frequently (every-other day) then please let us know  - Stop clonazepam (if hydroxyzine is ineffective okay to take clonazepam but do not take if already taken oxycodone or hydroxyzine).  - Follow-up in 3-4 weeks.    ------------------------------------------------------------------------    Thank you for coming to the PSYCHIATRY CLINIC.    Lab Testing:  If you had lab testing today and your results are reassuring or normal they will be mailed to you or sent through [email or web address] within 7 days. If the lab tests need quick action we will call you with the results. The phone number we will call with results is # [phone] (home) . If this is not the best number please call our clinic and change the number.    Medication Refills:  If you need any refills please call your pharmacy and they will contact us. Our fax number for refills is [phone]. Please allow three business for refill processing. If you need to  your refill at a new pharmacy, please contact the new pharmacy directly. The new pharmacy will help you get your medications transferred.     Scheduling:  If you have any concerns about today's visit or wish to schedule another appointment please call our office during normal business hours [phone] (8-5:00 M-F)    Contact Us:  Please call [phone] during business hours (8-5:00 M-F).  If after clinic hours, or on the weekend, please call  [phone].    Financial Assistance [phone]  [place] Billing [phone]  [place] Billing Office, [facility]: [phone]  [place] Billing [phone]  Medical Records [phone]      MENTAL HEALTH CRISIS NUMBERS:  For a medical emergency please call  911 or go to the nearest ER.     [place]:   [facility] -[phone]   Crisis Residence [facility] -[phone]   Walk-In Counseling Center [facility] -[phone]   COPE 24/7 [place] Mobile Team -[phone] (adults)/[phone] (child)  CHILD: [facility] needs assessment team - [phone]      [facility]:   [facility] - [phone]   Walk-in counseling [facility] - [phone]   Walk-in counseling [facility] - [phone]   Crisis Residence [facility] - [phone]  Urgent Care Adult Mental Ddwbrv-[phone] mobile unit/ 24/7 crisis line    National Crisis Numbers:   National Suicide Prevention Lifeline: 3-255-369-TALK (303-304-1142)  Poison Control Center - 1-113.426.4034  [employer]/resources for a list of additional resources (SOS)  Trans Lifeline a hotline for transgender people 5-239-675-4564  The Tu Project a hotline for LGBT youth 1-162.330.4551  Crisis Text Line: For any crisis 24/7   To: 356143  see www.crisistextline.org  - IF MAKING A CALL FEELS TOO HARD, send a text!         Again thank you for choosing PSYCHIATRY CLINIC and please let us know how we can best partner with you to improve you and your family's health.    You may be receiving a survey regarding this appointment. We would love to have your feedback, both positive and negative. The survey is done by an external company, so your answers are anonymous.
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
## 2020-09-21 ENCOUNTER — VIRTUAL VISIT (OUTPATIENT)
Dept: PSYCHIATRY | Facility: CLINIC | Age: 55
End: 2020-09-21
Attending: PSYCHIATRY & NEUROLOGY
Payer: COMMERCIAL

## 2020-09-21 DIAGNOSIS — F41.1 GAD (GENERALIZED ANXIETY DISORDER): Primary | ICD-10-CM

## 2020-09-21 DIAGNOSIS — F41.0 PANIC ATTACK: ICD-10-CM

## 2020-09-21 DIAGNOSIS — F33.1 MAJOR DEPRESSIVE DISORDER, RECURRENT EPISODE, MODERATE (H): ICD-10-CM

## 2020-09-21 RX ORDER — ESCITALOPRAM OXALATE 10 MG/1
10 TABLET ORAL DAILY
Qty: 30 TABLET | Refills: 1 | Status: SHIPPED | OUTPATIENT
Start: 2020-09-21 | End: 2020-11-23

## 2020-09-21 RX ORDER — HYDROXYZINE HYDROCHLORIDE 25 MG/1
25 TABLET, FILM COATED ORAL DAILY PRN
Qty: 30 TABLET | Refills: 1 | Status: SHIPPED | OUTPATIENT
Start: 2020-09-21 | End: 2020-12-15

## 2020-09-21 ASSESSMENT — PAIN SCALES - GENERAL: PAINLEVEL: NO PAIN (0)

## 2020-09-21 NOTE — PROGRESS NOTES
"VIDEO VISIT  Jailene Breen is a 54 year old patient who is being evaluated via a billable video visit.      The patient has been notified of following:   \"This video visit will be conducted via a call between you and your physician/provider. We have found that certain health care needs can be provided without the need for an in-person physical exam. This service lets us provide the care you need with a video conversation. If a prescription is necessary we can send it directly to your pharmacy. If lab work is needed we can place an order for that and you can then stop by our lab to have the test done at a later time. Insurers are generally covering virtual visits as they would in-office visits so billing should not be different than normal.  If for some reason you do get billed incorrectly, you should contact the billing office to correct it and that number is in the AVS .    Video Conference to be completed via:  Kip    Patient has given verbal consent for video visit?:  Yes    Patient would prefer that any video invitations be sent by: Send to e-mail at: [email or web address]      How would patient like to obtain AVS?:  Handpay    AVS SmartPhrase [PsychAVS] has been placed in 'Patient Instructions':  Yes    "

## 2020-09-25 ENCOUNTER — MYC MEDICAL ADVICE (OUTPATIENT)
Dept: FAMILY MEDICINE | Facility: CLINIC | Age: 55
End: 2020-09-25

## 2020-09-28 ENCOUNTER — MYC MEDICAL ADVICE (OUTPATIENT)
Dept: PALLIATIVE MEDICINE | Facility: CLINIC | Age: 55
End: 2020-09-28

## 2020-09-28 DIAGNOSIS — M54.59 LUMBAR FACET JOINT PAIN: ICD-10-CM

## 2020-09-28 DIAGNOSIS — M51.369 DDD (DEGENERATIVE DISC DISEASE), LUMBAR: ICD-10-CM

## 2020-09-28 DIAGNOSIS — M25.562 CHRONIC PAIN OF BOTH KNEES: ICD-10-CM

## 2020-09-28 DIAGNOSIS — M79.18 MYOFASCIAL PAIN: ICD-10-CM

## 2020-09-28 DIAGNOSIS — G89.4 CHRONIC PAIN SYNDROME: ICD-10-CM

## 2020-09-28 DIAGNOSIS — M47.816 SPONDYLOSIS OF LUMBAR REGION WITHOUT MYELOPATHY OR RADICULOPATHY: ICD-10-CM

## 2020-09-28 DIAGNOSIS — M45.7 ANKYLOSING SPONDYLITIS OF LUMBOSACRAL REGION (H): ICD-10-CM

## 2020-09-28 DIAGNOSIS — M53.3 SACROILIAC JOINT PAIN: ICD-10-CM

## 2020-09-28 DIAGNOSIS — G89.29 CHRONIC PAIN OF BOTH KNEES: ICD-10-CM

## 2020-09-28 DIAGNOSIS — M25.561 CHRONIC PAIN OF BOTH KNEES: ICD-10-CM

## 2020-09-29 RX ORDER — OXYCODONE HCL 10 MG/1
10 TABLET, FILM COATED, EXTENDED RELEASE ORAL EVERY 12 HOURS
Qty: 60 TABLET | Refills: 0 | Status: SHIPPED | OUTPATIENT
Start: 2020-09-29 | End: 2020-10-27

## 2020-09-29 NOTE — TELEPHONE ENCOUNTER
Routed to the nursing pool and to the MA pool to process refill(s).    Cha Molina, RN-BSN  [facility]

## 2020-09-29 NOTE — TELEPHONE ENCOUNTER
Medication refill information reviewed.     Due date for oxyCODONE (OXYCONTIN) 10 MG 12 hr tablet is 10/6/20     Prescriptions prepped for review.     Will route to provider.     Josias Lopez RN  Care Coordinator   [facility] [place]

## 2020-09-29 NOTE — TELEPHONE ENCOUNTER
Patient requesting refill(s) of oxyCODONE (OXYCONTIN) 10 MG 12 hr tablet     Last dispensed from pharmacy on 9/4/2020     Pt last seen by prescribing provider on 9/25/2020  Next appt scheduled for 9/30/2020     checked in the past 6 months? Yes If no, print current report and give to RN    Last urine drug screen date 01/21/2020  Current opioid agreement on file (completed within the last year) Yes Date of opioid agreement: 01/21/2020    E-prescribe to [id number]    Will route to nursing [place] for review and preparation of prescription(s).

## 2020-09-29 NOTE — TELEPHONE ENCOUNTER
Signed Prescriptions:                        Disp   Refills    oxyCODONE (OXYCONTIN) 10 MG 12 hr tablet   60 tab*0        Sig: Take 1 tablet (10 mg) by mouth every 12 hours Fill           10/4/2020. Begin 10/6/2020  Authorizing Provider: SUSANA PETTY    Reviewed [place]  September 29, 2020- no concerning fills.    Susana GRANT, RN CNP, FNP  [facility]  [employer] [facility]

## 2020-10-05 ENCOUNTER — MYC MEDICAL ADVICE (OUTPATIENT)
Dept: PALLIATIVE MEDICINE | Facility: CLINIC | Age: 55
End: 2020-10-05

## 2020-10-05 DIAGNOSIS — G89.4 CHRONIC PAIN SYNDROME: ICD-10-CM

## 2020-10-05 DIAGNOSIS — M06.9 RHEUMATOID ARTHRITIS INVOLVING MULTIPLE JOINTS (H): ICD-10-CM

## 2020-10-06 ENCOUNTER — VIRTUAL VISIT (OUTPATIENT)
Dept: FAMILY MEDICINE | Facility: CLINIC | Age: 55
End: 2020-10-06
Payer: COMMERCIAL

## 2020-10-06 DIAGNOSIS — F41.1 GAD (GENERALIZED ANXIETY DISORDER): ICD-10-CM

## 2020-10-06 DIAGNOSIS — F32.1 MODERATE MAJOR DEPRESSION (H): ICD-10-CM

## 2020-10-06 DIAGNOSIS — M06.9 RHEUMATOID ARTHRITIS INVOLVING MULTIPLE SITES, UNSPECIFIED WHETHER RHEUMATOID FACTOR PRESENT (H): ICD-10-CM

## 2020-10-06 DIAGNOSIS — Z20.822 SUSPECTED COVID-19 VIRUS INFECTION: ICD-10-CM

## 2020-10-06 DIAGNOSIS — R33.9 URINARY RETENTION WITH INCOMPLETE BLADDER EMPTYING: Primary | ICD-10-CM

## 2020-10-06 DIAGNOSIS — R05.9 COUGH: ICD-10-CM

## 2020-10-06 PROCEDURE — 96127 BRIEF EMOTIONAL/BEHAV ASSMT: CPT | Mod: 95 | Performed by: PHYSICIAN ASSISTANT

## 2020-10-06 PROCEDURE — 99214 OFFICE O/P EST MOD 30 MIN: CPT | Mod: 95 | Performed by: PHYSICIAN ASSISTANT

## 2020-10-06 RX ORDER — CODEINE PHOSPHATE AND GUAIFENESIN 10; 100 MG/5ML; MG/5ML
1-2 SOLUTION ORAL EVERY 4 HOURS PRN
Qty: 180 ML | Refills: 0 | Status: SHIPPED | OUTPATIENT
Start: 2020-10-06 | End: 2021-01-16

## 2020-10-06 RX ORDER — BENZONATATE 200 MG/1
200 CAPSULE ORAL 3 TIMES DAILY PRN
Qty: 20 CAPSULE | Refills: 0 | Status: SHIPPED | OUTPATIENT
Start: 2020-10-06 | End: 2021-02-15

## 2020-10-06 RX ORDER — TAMSULOSIN HYDROCHLORIDE 0.4 MG/1
0.4 CAPSULE ORAL DAILY
Qty: 7 CAPSULE | Refills: 0 | Status: SHIPPED | OUTPATIENT
Start: 2020-10-06 | End: 2021-02-02

## 2020-10-06 RX ORDER — OXYCODONE HYDROCHLORIDE 5 MG/1
5 TABLET ORAL EVERY 6 HOURS PRN
Qty: 90 TABLET | Refills: 0 | Status: SHIPPED | OUTPATIENT
Start: 2020-10-06 | End: 2020-11-03

## 2020-10-06 RX ORDER — SULFAMETHOXAZOLE/TRIMETHOPRIM 800-160 MG
1 TABLET ORAL 2 TIMES DAILY
Qty: 14 TABLET | Refills: 0 | Status: SHIPPED | OUTPATIENT
Start: 2020-10-06 | End: 2020-10-13

## 2020-10-06 ASSESSMENT — PATIENT HEALTH QUESTIONNAIRE - PHQ9: SUM OF ALL RESPONSES TO PHQ QUESTIONS 1-9: 14

## 2020-10-06 NOTE — PATIENT INSTRUCTIONS
At [facility], we strive to deliver an exceptional experience to you, every time we see you. If you receive a survey, please complete it as we do value your feedback.  If you have MyChart, you can expect to receive results automatically within 24 hours of their completion.  Your provider will send a note interpreting your results as well.   If you do not have MyChart, you should receive your results in about a week by mail.    Your care team:                            Family Medicine Internal Medicine   MD Paul Coy MD Shantel Branch-Fleming, MD Katya Georgiev PA-C Megan Hill, APRN CNP    Meño Swan, MD Pediatrics   Ruperto Resendez, PALEYDI Jimenez, MD Dottie Pal APRN CNP   MD Paula Negrete MD Deborah Mielke, MD Kim Thein, APRN [facility]      Clinic hours: Monday - Thursday 7 am-7 pm; Fridays 7 am-5 pm.   Urgent care: Monday - Friday 11 am-9 pm; Saturday and Sunday 9 am-5 pm.    Clinic: [phone]       [facility]: Monday - Thursday 8 am - 7 pm; Friday 8 am - 6 pm  [facility]: [phone]     Use www.oncare.org for 24/7 diagnosis and treatment of dozens of conditions.  Patient Education     Coronavirus Disease 2019 (COVID-19): Caring for Yourself or Others  If you or a household member have symptoms of COVID-19, follow the guidelines below. This will help you manage symptoms and keep the virus from spreading.  If you have symptoms of COVID-19    Stay home and contact your care team. They will tell you what to do.    Don t panic. Keep in mind that other illnesses can cause similar symptoms.    Stay away from work, school, and public places.    Limit physical contact with others in your home. Limit visitors. No kissing.    Clean surfaces you touch with disinfectant.    If you need to cough or sneeze, do it into a tissue. Then, throw the tissue into the trash. If you don't have  tissues, cough or sneeze into the bend of your elbow    Don t share food or personal items with people in your household. This includes items like eating and drinking utensils, towels, and bedding.    Wear a cloth face mask around other people. It should cover your nose and mouth. You may need to make your own mask using a bandana, T-shirt, or other cloth. See the CDC s instructions on how to make a mask.    If you need to go to a hospital or clinic, call ahead to let them know. Expect the care team to wear masks, gowns, gloves, and eye protection. You may be put in a separate room.    Follow all instructions from your care team.  If you ve been diagnosed with COVID-19    Stay home and away from others, including other people in your home. (This is called self-isolation.) Don t leave home unless you need to get medical care. Don't go to work, school, or public places. Don't use buses, taxis, or other public transportation.    Follow all instructions from your care team.    If you need to go to a hospital or clinic, call ahead to let them know. Expect the care team to wear masks, gowns, gloves, and eye protection. You may be put in a separate room.    Wear a face mask over your nose and mouth. This is to protect others from your germs. If you can t wear a mask, your caregivers should wear one. You may need to make your own mask using a bandana, T-shirt, or other cloth. See the CDC s instructions on how to make a mask.    Have no contact with pets and other animals.    Don t share food or personal items with people in your household. This includes items like eating and drinking utensils, towels, and bedding.    If you need to cough or sneeze, do it into a tissue. Then, throw the tissue into the trash. If you don't have tissues, cough or sneeze into the bend of your elbow.    Wash your hands often.  Self-care at home  At this time, there is no medicine approved to prevent or treat COVID-19. Experts are testing  different medicines, trying to find one that works.  So far, the most proven treatment is to support your body while it fights the virus.    Get plenty of rest.    Drink extra fluids (6 to 8 glasses of liquids each day), unless a doctor has told you not to. Ask your care team which fluids are best for you. Avoid fluids that contain caffeine or alcohol.    Take over-the-counter (OTC) medicine to help reduce pain and fever. Your care team will tell you which OTC medicine to use.  If you ve been in the hospital for COVID-19, follow your care team s instructions. This may include changing positions to help your breathing (such as lying on your belly).  If a test showed that you have COVID-19, you may be asked to donate plasma after you ve recovered. (This is called COVID-19 convalescent plasma donation.) The plasma may contain antibodies to help fight the virus in others. Scientists are testing whether this might be a treatment in the future. For more information, talk to your care team.  Caring for a sick person    Follow all instructions from the care team.    Wash your hands often.    Wear protective clothing as advised.    Make sure the sick person wears a mask. If they can't wear a mask, don't stay in the same room with them. If you must be in the same room, wear a face mask. Make sure the mask covers both the nose and mouth.    Keep track of the sick person s symptoms.    Clean surfaces often with disinfectant. This includes phones, kitchen counters, fridge door handles, bathroom surfaces, and others.    Don t let anyone share household items with the sick person. This includes eating and drinking tools, towels, sheets, and blankets.    Clean fabrics and laundry well.    Keep other people and pets away from the sick person.  When you can stop self-isolation  When you are sick with COVID-19, you should stay away from other people. This is called self-isolation. The rules for ending self-isolation depend on your  health in general.  If you are normally healthy  You can stop self-isolation when all 3 of these are true:    You ve had no fever--and no medicine that reduces fever--for 3 full days (72 hours). And     Your symptoms are better, such as cough or trouble breathing. And     At least 10 days have passed since your symptoms first started.  Talk with your care team before you leave home. They may tell you it s okay to leave, or they may give you different advice.  If you have a weak immune system  If you re being treated for cancer, have an immune disorder (such as HIV), or have had a transplant (organ or bone marrow), follow your care team s instructions. You may be able to end self-isolation when all 3 of these are true:    You ve had no fever--and no medicine that reduces fever--for 3 full days (72 hours). And     Your symptoms are better, such as cough or trouble breathing. And     You ve had 2 tests for COVID-19. The tests happened at least 24 hours apart, and both show that you don t have the virus. (If no tests are available, your care team may tell you to follow the rules for normally healthy people above.)  When to call your care team  Call your care team right away if a sick person has any of these:    Trouble breathing    Pain or pressure in the chest  If a sick person has any of these, call 911:    Trouble breathing that gets worse    Pain or pressure in the chest that gets worse    Blue tint to lips or face    Fast or irregular heartbeat    Confusion or trouble waking    Fainting or loss of consciousness    Coughing up blood  Going home from the hospital  If you have COVID-19 and were recently in the hospital:    Follow the instructions above for self-care and isolation.    Follow the hospital care team s instructions.    Ask questions if anything is unclear to you. Write down answers so you remember them.  Last modified date: 5/8/2020  This information has been modified by your health care provider with  permission from the publisher.      3361-6609 [facility], [address] [place] [address], [place] [address]. All rights reserved. This information is not intended as a substitute for professional medical care. Always follow your healthcare professional's instructions. This information has been modified by your health care provider with permission from the publisher.             Patient Education     Urinary Retention (Male)  Urinary retention is the medical term for difficulty or inability to pass urine, even though your bladder is full.  Causes  The most common cause of urinary retention in men is the bladder outlet being blocked. This can be due to an enlarged prostate gland or a bladder infection. Certain medicines can also cause this problem. This condition is more likely to occur as men get older.    Symptoms  Common symptoms of urinary retention include:    Pain (not experienced by everyone)    Frequent urination    Feeling that the bladder is still full after urinating    Incontinence (not being able to control the release of urine)    Swollen abdomen  Treatment  This condition is treated by inserting a tube (catheter) into the bladder to drain the urine. This provides immediate relief. The catheter may need to stay in place for a few days. The catheter has a balloon on the tip, which is inflated after insertion. This prevents the catheter from falling out.  Home care    If you were given antibiotics, take them until they are used up, or your healthcare provider tells you to stop. It is important to finish the antibiotics even though you feel better. This is to make sure your infection has cleared.    If a catheter was left in place, it is important to keep bacteria from getting into the collection bag. Don't disconnect the catheter from the collection bag.    Use a leg band to secure the drainage tube, so it does not pull on the catheter. Drain the collection bag when it becomes full using the drain spout at the  bottom of the bag.    Don't pull on or try to remove your catheter. This will injure your urethra. The catheter must be removed by a healthcare provider.  Follow-up care  Follow up with your healthcare provider, or as advised.  If a catheter was left in place, it can usually be removed within 3 to 7 days. Some conditions require the catheter to stay in longer. Your healthcare provider will tell you when to return to have the catheter removed.  When to seek medical advice  Call your healthcare provider right away if any of these occur:    Fever of 100.4 F (38 C) or higher, or as directed by your healthcare provider    Bladder or lower-abdominal pain or fullness    Abdominal swelling, nausea, vomiting, or back pain    Blood or urine leakage around the catheter    Bloody urine coming from the catheter (if a new symptom)    Weakness, dizziness, or fainting    Confusion or change in usual level of alertness    If a catheter was left in place, return if:  ? Catheter falls out  ? Catheter stops draining for 6 hours  Date Last Reviewed: 10/1/2017    1273-2289 [employer]. [address] [place] [address]. All rights reserved. This information is not intended as a substitute for professional medical care. Always follow your healthcare professional's instructions.

## 2020-10-06 NOTE — TELEPHONE ENCOUNTER
Routed to the nursing pool and to the MA pool to process refill(s).    Cha Molina, RN-BSN  [facility]

## 2020-10-06 NOTE — TELEPHONE ENCOUNTER
Medication refill information reviewed.     Due date for oxyCODONE (ROXICODONE) 5 MG tablet is 10/12/20     patient comment- remember that I will be 2 days earlier from the one pill extra a day for a week you authorized and is noted in our last appointment,  thank you.    Prescriptions prepped for review.     Will route to provider.     Josias Lopez RN  Care Coordinator   [facility] [place]

## 2020-10-06 NOTE — PROGRESS NOTES
"Jailene Breen is a 55 year old male who is being evaluated via a billable video visit.      The patient has been notified of following:     \"This video visit will be conducted via a call between you and your physician/provider. We have found that certain health care needs can be provided without the need for an in-person physical exam.  This service lets us provide the care you need with a video conversation.  If a prescription is necessary we can send it directly to your pharmacy.  If lab work is needed we can place an order for that and you can then stop by our lab to have the test done at a later time.    Video visits are billed at different rates depending on your insurance coverage.  Please reach out to your insurance provider with any questions.    If during the course of the call the physician/provider feels a video visit is not appropriate, you will not be charged for this service.\"    Patient has given verbal consent for Video visit? Yes  How would you like to obtain your AVS? Mail a copy  If you are dropped from the video visit, the video invite should be resent to: notes  Will anyone else be joining your video visit? np    Subjective     Jailene Breen is a 55 year old male who presents today via video visit for the following health issues:    HPI   Patient having urinary retention- spent about an hour trying to force out about a shot glass worth out.  Lower abd is sore.   Started yesterday, Had a pretty good emptying this am.  But now retaining again.      About a month ago, developed URI symptoms, tested negative for covid, tx'd with doxy with negative CXR.  The day after his abxs were complete he felt good for 2 days, then his household members got sick for a couple days and he seems to have picked this up last week.  Was doing ok , possibly getting better, until   Last night the cough has been worse and has been productive.    Household contacts were not tested.      Took enbrel a few days after " the doxy was done, but that's the only dose in the past month.      Moves up and down. Recently started seeing a psychiatrist. PHQ9  a little bit improved today       Video Start Time: 3:31 PM     Review of Systems   CONSTITUTIONAL:low grade temp last week  RESP:cough-productive  : retention as above      Objective           Vitals:  No vitals were obtained today due to virtual visit.    Physical Exam     GENERAL: Healthy, alert and no distress  EYES: Eyes grossly normal to inspection.  No discharge or erythema, or obvious scleral/conjunctival abnormalities.  RESP: No audible wheeze, cough, or visible cyanosis.  No visible retractions or increased work of breathing.    SKIN: Visible skin clear. No significant rash, abnormal pigmentation or lesions.  NEURO: Cranial nerves grossly intact.  Mentation and speech appropriate for age.  PSYCH: Mentation appears normal, affect normal/bright, judgement and insight intact, normal speech and appearance well-groomed.             Assessment & Plan      Patient appears well but urinary retention is concerning. Will treat empirically for UTI and prostatitis with Bactrim as below. Discussed that we would expect this medicine to kick in for a couple days. If he is not able to produce more urine TODAY, before bedtime, and/or has increasing abdominal discomfort or distention, he was strongly advise to go to the emergency room so that they can address the issue promptly.  Diagnoses and all orders for this visit:    Urinary retention with incomplete bladder emptying  -     sulfamethoxazole-trimethoprim (BACTRIM DS) 800-160 MG tablet; Take 1 tablet by mouth 2 times daily for 7 days  -     tamsulosin (FLOMAX) 0.4 MG capsule; Take 1 capsule (0.4 mg) by mouth daily    Suspected COVID-19 virus infection  -     Symptomatic COVID-19 Virus (Coronavirus) by PCR; Future    Cough  -     guaiFENesin-codeine (ROBITUSSIN AC) 100-10 MG/5ML solution; Take 5-10 mLs by mouth every 4 hours as needed for  cough  -     benzonatate (TESSALON) 200 MG capsule; Take 1 capsule (200 mg) by mouth 3 times daily as needed for cough    JEFF (generalized anxiety disorder)  -     EMOTIONAL / BEHAVIORAL ASSESSMENT    Moderate major depression (H)  -     EMOTIONAL / BEHAVIORAL ASSESSMENT    Rheumatoid arthritis involving multiple sites, unspecified whether rheumatoid factor present (H)                 Return today (on 10/6/2020), or if symptoms worsen or fail to improve.    JASON Rice  [facility]      Video-Visit Details    Type of service:  Video Visit    Video End Time:3:54 PM    Originating Location (pt. Location): Home    Distant Location (provider location):  [facility]     Platform used for Video Visit: uCba

## 2020-10-06 NOTE — TELEPHONE ENCOUNTER
Signed Prescriptions:                        Disp   Refills    oxyCODONE (ROXICODONE) 5 MG tablet         90 tab*0        Sig: Take 1 tablet (5 mg) by mouth every 6 hours as needed           for severe pain Max of 3 tabs/day.  Okay to           dispense 10/10/20 and start 10/12/2020  Authorizing Provider: SUSANA PETTY    Reviewed [place]  October 6, 2020- no concerning fills.    Susana GRANT, RN CNP, FNP  [facility]  [facility]

## 2020-10-06 NOTE — TELEPHONE ENCOUNTER
patient comment- remember that I will be 2 days earlier from the one pill extra a day for a week you authorized and is noted in our last appointment,  thank you.    Patient requesting refill(s) of oxyCODONE (ROXICODONE) 5 MG tablet  Last dispensed from pharmacy on 9/12/2020    Pt last seen by prescribing provider on 9/25/2020   Next appt scheduled for None      checked in the past 6 months? Yes If no, print current report and give to RN    Last urine drug screen date 01/21/2020  Current opioid agreement on file (completed within the last year) Yes Date of opioid agreement: 01/21/2020    E-prescribe to [id number] - [facility]     Will route to nursing [place] for review and preparation of prescription(s).

## 2020-10-07 ENCOUNTER — TELEPHONE (OUTPATIENT)
Dept: UROLOGY | Facility: CLINIC | Age: 55
End: 2020-10-07

## 2020-10-07 ENCOUNTER — MYC MEDICAL ADVICE (OUTPATIENT)
Dept: PALLIATIVE MEDICINE | Facility: CLINIC | Age: 55
End: 2020-10-07

## 2020-10-07 NOTE — TELEPHONE ENCOUNTER
Per patient myChart message:  From: Jamison Breen      Created: 10/7/2020 7:40 AM      Urgent,  last night I was in the ER because I couldn't pee at all. They ended up putting a catheter in me and tested the urine they collected,  there was a lot of blood mixed in. They did a ct scan to see if it was stones and was not. All on my birthday,  lol. I need to keep this catheter in till I see a urologist and they figure it out, and initially its going through [facility] because I was at [facility] but it needs to stay in [place] and I don't know where to start.  Can you help me get in touch with the right people please.      ______________  Per patient Daryahart message:  From: Cha Molina RN      Created: 10/7/2020 8:49 AM      Hi Les.  Sorry to hear this!  This all needs to go through your primary care provider.   We are not familiar w/ acute care issues and where to go; primary care is who to connect with.  Your primary care provider listed is JASON Stevens; you saw him yesterday. Reach out to their team for guidance.     Good luck.        Routed to provider..    Cha RN-BSN  [facility]

## 2020-10-07 NOTE — TELEPHONE ENCOUNTER
Hi Les-     Agree with Cha. Reach out to your primary care provider. I am not the right provider to follow up with for this issue, and I don't know the urology team...     Be well.  Susana GRANT RN CNP, JAQUELINP  [facility]    I have sent the above PlayFitnesst message to the patient.     Susana GRANT RN CNP, JAQUELINP  [facility]

## 2020-10-07 NOTE — TELEPHONE ENCOUNTER
[facility] Call Center    Phone Message    May a detailed message be left on voicemail: yes     Reason for Call: The patient was seen for Urinary retention and blood in his urine at [facility] ED on 10/06/2020.  He has a catheter in place and would like to discuss a work in visit.  Thank you.     Action Taken: Message routed to:  Adult Clinics: Urology p [id number]    Travel Screening: Not Applicable

## 2020-10-09 ENCOUNTER — ALLIED HEALTH/NURSE VISIT (OUTPATIENT)
Dept: FAMILY MEDICINE | Facility: OTHER | Age: 55
End: 2020-10-09
Payer: COMMERCIAL

## 2020-10-09 DIAGNOSIS — R33.9 URINARY RETENTION WITH INCOMPLETE BLADDER EMPTYING: Primary | ICD-10-CM

## 2020-10-09 PROCEDURE — 99207 PR NO CHARGE NURSE ONLY: CPT

## 2020-10-09 NOTE — PROGRESS NOTES
Patient here at request of Dr. Rai to check catheter and why it could be leaking and to give him a leg beg.  Connections and ports appear intact but measurement screwed may have been leaking due to not screwed tightly.     [place] bag changed, education on how to secure and to leg bag.      Patient states he understands and will call if any questions.    Haim Dan, KERLINEN, RN, PHN

## 2020-10-09 NOTE — TELEPHONE ENCOUNTER
Patient scheduled for follow up TOV/Cysto.  Patient has large overnight bag.   Patient to follow up with nursing to help catheter teaching and use of bags.   Dena Doe RN

## 2020-10-12 ENCOUNTER — TRANSFERRED RECORDS (OUTPATIENT)
Dept: HEALTH INFORMATION MANAGEMENT | Facility: CLINIC | Age: 55
End: 2020-10-12

## 2020-10-27 ENCOUNTER — MYC MEDICAL ADVICE (OUTPATIENT)
Dept: PALLIATIVE MEDICINE | Facility: CLINIC | Age: 55
End: 2020-10-27

## 2020-10-27 DIAGNOSIS — M25.562 CHRONIC PAIN OF BOTH KNEES: ICD-10-CM

## 2020-10-27 DIAGNOSIS — M53.3 SACROILIAC JOINT PAIN: ICD-10-CM

## 2020-10-27 DIAGNOSIS — M25.561 CHRONIC PAIN OF BOTH KNEES: ICD-10-CM

## 2020-10-27 DIAGNOSIS — M79.18 MYOFASCIAL PAIN: ICD-10-CM

## 2020-10-27 DIAGNOSIS — M45.7 ANKYLOSING SPONDYLITIS OF LUMBOSACRAL REGION (H): ICD-10-CM

## 2020-10-27 DIAGNOSIS — G89.29 CHRONIC PAIN OF BOTH KNEES: ICD-10-CM

## 2020-10-27 DIAGNOSIS — M47.816 SPONDYLOSIS OF LUMBAR REGION WITHOUT MYELOPATHY OR RADICULOPATHY: ICD-10-CM

## 2020-10-27 DIAGNOSIS — G89.4 CHRONIC PAIN SYNDROME: ICD-10-CM

## 2020-10-27 DIAGNOSIS — M54.59 LUMBAR FACET JOINT PAIN: ICD-10-CM

## 2020-10-27 DIAGNOSIS — M51.369 DDD (DEGENERATIVE DISC DISEASE), LUMBAR: ICD-10-CM

## 2020-10-27 RX ORDER — OXYCODONE HCL 10 MG/1
10 TABLET, FILM COATED, EXTENDED RELEASE ORAL EVERY 12 HOURS
Qty: 60 TABLET | Refills: 0 | Status: SHIPPED | OUTPATIENT
Start: 2020-10-27 | End: 2020-11-11

## 2020-10-27 NOTE — TELEPHONE ENCOUNTER
Routed to the nursing pool and to the MA pool to process refill(s).    Cha Molina, RN-BSN  [facility]

## 2020-10-27 NOTE — TELEPHONE ENCOUNTER
Received call from patient requesting refill(s) of oxyCODONE (OXYCONTIN) 10 MG 12 hr tablet    Last dispensed from pharmacy on 10/04/20    Pt last seen by prescribing provider on 09/25/20  Next appt scheduled for : none     checked in the past 6 months? Yes If no, print current report and give to RN    Last urine drug screen date 01/21/20  Current opioid agreement on file (completed within the last year) Yes Date of opioid agreement: 01/21/20    E-prescribe to [id number] - [facility] - [address]     Will route to nursing [place] for review and preparation of prescription(s).

## 2020-10-27 NOTE — TELEPHONE ENCOUNTER
Medication refill information reviewed.     Due date for oxyCODONE (OXYCONTIN) 10 MG 12 hr tablet is 11/5/20     Prescriptions prepped for review.     Will route to provider.     Josias Lopez RN  Care Coordinator   [facility] [place]

## 2020-10-27 NOTE — TELEPHONE ENCOUNTER
Signed Prescriptions:                        Disp   Refills    oxyCODONE (OXYCONTIN) 10 MG 12 hr tablet   60 tab*0        Sig: Take 1 tablet (10 mg) by mouth every 12 hours Fill           11/3/2020. Begin 11/5/2020  Authorizing Provider: SUSANA PETTY    Reviewed [place]  October 27, 2020- no concerning fills.  Please remind patient that any other opiates, including cough medicine, should be reported to our clinic per the controlled substance agreement.   Susana Petty APRN, RN CNP, FNP  [facility]  [facility]

## 2020-10-28 NOTE — TELEPHONE ENCOUNTER
Informed patient that their refill of oxyCODONE (OXYCONTIN) 10 MG 12 hr tablet was E-scribed to the pharmacy. Patient was informed of fill and start date.    Reminded patient to report any other opiates they are taking including those that are in cough medicine to our clinic per the Controlled Substance Agreement that they signed.     Sherron Jennings [facility]  [place]

## 2020-11-02 NOTE — TELEPHONE ENCOUNTER
Per patient myChart message:  From: Jamison Breen      Created: 11/2/2020 2:20 PM      I do apologize for not contacting,  I ended up in the er after the visit getting a catheter put in. My wife picked up all my prescriptions and I actually didn't use it. Just a lot going on at the time and will make it a priority next time if it happens.

## 2020-11-03 ENCOUNTER — MYC MEDICAL ADVICE (OUTPATIENT)
Dept: PALLIATIVE MEDICINE | Facility: CLINIC | Age: 55
End: 2020-11-03

## 2020-11-03 DIAGNOSIS — G89.4 CHRONIC PAIN SYNDROME: ICD-10-CM

## 2020-11-03 DIAGNOSIS — M06.9 RHEUMATOID ARTHRITIS INVOLVING MULTIPLE JOINTS (H): ICD-10-CM

## 2020-11-03 DIAGNOSIS — M79.18 MYOFASCIAL PAIN: ICD-10-CM

## 2020-11-03 DIAGNOSIS — M62.830 BACK MUSCLE SPASM: ICD-10-CM

## 2020-11-03 RX ORDER — CYCLOBENZAPRINE HCL 10 MG
5-10 TABLET ORAL 3 TIMES DAILY PRN
Qty: 60 TABLET | Refills: 2 | Status: SHIPPED | OUTPATIENT
Start: 2020-11-03 | End: 2021-02-02

## 2020-11-03 RX ORDER — OXYCODONE HYDROCHLORIDE 5 MG/1
5 TABLET ORAL EVERY 6 HOURS PRN
Qty: 90 TABLET | Refills: 0 | Status: SHIPPED | OUTPATIENT
Start: 2020-11-03 | End: 2020-11-11

## 2020-11-03 NOTE — TELEPHONE ENCOUNTER
Signed Prescriptions:                        Disp   Refills    cyclobenzaprine (FLEXERIL) 10 MG tablet    60 tab*2        Sig: Take 0.5-1 tablets (5-10 mg) by mouth 3 times daily           as needed for muscle spasms Caution sedation  Authorizing Provider: TAMICA PETTY, RN CNP, FNP  [facility]  [facility]

## 2020-11-03 NOTE — TELEPHONE ENCOUNTER
Signed Prescriptions:                        Disp   Refills    oxyCODONE (ROXICODONE) 5 MG tablet         90 tab*0        Sig: Take 1 tablet (5 mg) by mouth every 6 hours as needed           for severe pain Max of 3 tabs/day.  Okay to           dispense 11/9/20 and start 11/11/2020  Authorizing Provider: SUSANA PETTY    Reviewed [place]  November 3, 2020- no concerning fills.    Susana GRANT, RN CNP, FNP  [facility]  [facility]

## 2020-11-03 NOTE — TELEPHONE ENCOUNTER
Received call from patient requesting refill(s) of oxyCODONE (ROXICODONE) 5 MG tablet    Last dispensed from pharmacy on 10/10/20    Pt last seen by prescribing provider on 09/25/20  Next appt scheduled for 11/11/20     checked in the past 6 months? Yes If no, print current report and give to RN    Last urine drug screen date 01/21/20  Current opioid agreement on file (completed within the last year) Yes Date of opioid agreement: 01/21/20    E-prescribe to [id number] - [place] - [address]     Will route to nursing [place] for review and preparation of prescription(s).

## 2020-11-03 NOTE — TELEPHONE ENCOUNTER
Routed to the nursing pool and to the MA pool to process refill(s).    Cha Molina, RN-BSN  [facility]

## 2020-11-03 NOTE — TELEPHONE ENCOUNTER
Medication refill information reviewed.     Due date for oxyCODONE (ROXICODONE) 5 MG tablet is 11/11/20     Prescriptions prepped for review.     Will route to provider.     Josias Lopez, RN  Care Coordinator   [facility] [place]

## 2020-11-03 NOTE — TELEPHONE ENCOUNTER
Received fax request from   [id number] - [place] - [address]  [address] [address]  Phone: [phone] Fax: [phone]     pharmacy requesting refill(s) for cyclobenzaprine (FLEXERIL) 10 MG tablet    Last refilled on 07/15/20    Pt last seen on 09/25/20    Next appt scheduled for 11/11/20    Will facilitate refill.    Sherron Jennings [facility]  [place]

## 2020-11-10 NOTE — PROGRESS NOTES
Pre procedure Diagnosis: facet arthropathy   Post procedure Diagnosis: Same  Procedure performed: left L3-5 medial branch blocks #2  Anesthesia: none  Complications: none.  In prior, Vessel left L5, consider using contrast for radiofrequency ablation   Operators: Ashlyn Gamble MD    Indications:   Jailene Breen is a 55 year old male was sent by Susana Pike NP for left medial branch block.  They have a history of rheumatoid arthritis, and chronic low back pain.  He has previously had radiofrequency ablation on the right with good success.  His low back pain is on the left as well. .  Last medial branch block on the left was helpful.= Exam shows pain with extension/rotation bilaterally and they have tried conservative treatment including meds/PT.      He would like to repeat the right radiofrequency ablation when it gets time to do this left side.  Had 60% pain relief with right radiofrequency ablation    Physical therapy last done:  Has done in past, no recent.    Options/alternatives, benefits and risks were discussed with the patient including bleeding, infection, tissue trauma, exposure to radiation, reaction to medications, spinal cord injury, weakness, numbness and paralysis.  Questions were answered to his satisfaction and he agrees to proceed. Voluntary informed consent was obtained and signed.     Vitals were reviewed: Yes  Allergies were reviewed:  Yes   Medications were reviewed:  Yes   Pre-procedure pain score: 10/10    Procedure:  After getting informed consent, patient was brought into the procedure suite and was placed in a prone position on the procedure table.   A Pause for the Cause was performed.  Patient was prepped and draped in sterile fashion.     Under AP fluoroscopic guidance the L3, L4, L5 vertebral bodies were identified. The C-arm was rotated to the oblique view to afford optimal visualization the pedicles.  Lidocaine 1% was used to anesthetize the skin at each level.  Under  intermittent fluoroscopy, 22G 5inch Quinke spinal needles were positioned inferior and lateral to the intersection of the transverse process and pedicle at the Left L4 & L5 levels, as well as the corresponding sacral alar notch. The needle positions were verified and optimized from the AP view.    The anatomic targets for the L3 & L4 medial nerve and L5 dorsal ramus (which functionally incorporates the medial branch) were the  L4 & L5 transverse processes and sacral alar notch, with laterality as described above, resulting in blockade of the L4/5 and L5/S1 facet joints.    Omnipaque-300 was injected, and appropriate spread of contrast was noted without vascular uptake.  A total of 1ml of Omnipaque was used.  9ml was wasted. Bupivacaine 0.5% 0.5 ml was injected at each location.  The needles were removed.      Hemostasis was achieved, the area was cleaned, and bandaids were placed when appropriate.  The patient tolerated the procedure well, and was taken to the recovery room.    Images were saved to PACS.    The patient will continue to monitor progress, and they were given a pain diary to complete at home.  They will either fax or mail this back to us or bring it to their next appointment. We will determine the treatment plan after we review the diary.      Post-procedure pain score: 6/10  Follow-up includes:   -f/u phone call in one week  -will await diary for further planning.    Ashlyn Gamble MD  [facility]

## 2020-11-11 ENCOUNTER — TELEPHONE (OUTPATIENT)
Dept: PALLIATIVE MEDICINE | Facility: CLINIC | Age: 55
End: 2020-11-11

## 2020-11-11 ENCOUNTER — RADIOLOGY INJECTION OFFICE VISIT (OUTPATIENT)
Dept: PALLIATIVE MEDICINE | Facility: CLINIC | Age: 55
End: 2020-11-11
Payer: COMMERCIAL

## 2020-11-11 ENCOUNTER — ANCILLARY PROCEDURE (OUTPATIENT)
Dept: RADIOLOGY | Facility: CLINIC | Age: 55
End: 2020-11-11
Attending: PSYCHIATRY & NEUROLOGY
Payer: COMMERCIAL

## 2020-11-11 ENCOUNTER — VIRTUAL VISIT (OUTPATIENT)
Dept: PALLIATIVE MEDICINE | Facility: CLINIC | Age: 55
End: 2020-11-11
Payer: COMMERCIAL

## 2020-11-11 VITALS — OXYGEN SATURATION: 95 % | HEART RATE: 56 BPM | DIASTOLIC BLOOD PRESSURE: 78 MMHG | SYSTOLIC BLOOD PRESSURE: 119 MMHG

## 2020-11-11 DIAGNOSIS — M51.369 DDD (DEGENERATIVE DISC DISEASE), LUMBAR: ICD-10-CM

## 2020-11-11 DIAGNOSIS — M47.816 SPONDYLOSIS OF LUMBAR REGION WITHOUT MYELOPATHY OR RADICULOPATHY: ICD-10-CM

## 2020-11-11 DIAGNOSIS — M25.562 CHRONIC PAIN OF BOTH KNEES: ICD-10-CM

## 2020-11-11 DIAGNOSIS — M45.7 ANKYLOSING SPONDYLITIS OF LUMBOSACRAL REGION (H): ICD-10-CM

## 2020-11-11 DIAGNOSIS — M47.819 FACET ARTHROPATHY: Primary | ICD-10-CM

## 2020-11-11 DIAGNOSIS — G89.29 CHRONIC PAIN OF BOTH KNEES: ICD-10-CM

## 2020-11-11 DIAGNOSIS — M54.59 LUMBAR FACET JOINT PAIN: Primary | ICD-10-CM

## 2020-11-11 DIAGNOSIS — Z20.822 ENCOUNTER FOR LABORATORY TESTING FOR COVID-19 VIRUS: ICD-10-CM

## 2020-11-11 DIAGNOSIS — G89.29 CHRONIC BILATERAL LOW BACK PAIN WITHOUT SCIATICA: ICD-10-CM

## 2020-11-11 DIAGNOSIS — M79.18 MYOFASCIAL PAIN: ICD-10-CM

## 2020-11-11 DIAGNOSIS — M06.9 RHEUMATOID ARTHRITIS INVOLVING MULTIPLE JOINTS (H): ICD-10-CM

## 2020-11-11 DIAGNOSIS — M25.561 CHRONIC PAIN OF BOTH KNEES: ICD-10-CM

## 2020-11-11 DIAGNOSIS — M54.50 CHRONIC BILATERAL LOW BACK PAIN WITHOUT SCIATICA: ICD-10-CM

## 2020-11-11 DIAGNOSIS — G89.4 CHRONIC PAIN SYNDROME: ICD-10-CM

## 2020-11-11 PROCEDURE — 64494 INJ PARAVERT F JNT L/S 2 LEV: CPT | Mod: LT | Performed by: PSYCHIATRY & NEUROLOGY

## 2020-11-11 PROCEDURE — 64493 INJ PARAVERT F JNT L/S 1 LEV: CPT | Mod: LT | Performed by: PSYCHIATRY & NEUROLOGY

## 2020-11-11 PROCEDURE — 99214 OFFICE O/P EST MOD 30 MIN: CPT | Mod: 95 | Performed by: NURSE PRACTITIONER

## 2020-11-11 RX ORDER — OXYCODONE HYDROCHLORIDE 5 MG/1
5 TABLET ORAL EVERY 6 HOURS PRN
Qty: 90 TABLET | Refills: 0 | Status: SHIPPED | OUTPATIENT
Start: 2020-11-11 | End: 2021-01-04

## 2020-11-11 RX ORDER — OXYCODONE HCL 10 MG/1
10 TABLET, FILM COATED, EXTENDED RELEASE ORAL EVERY 12 HOURS
Qty: 60 TABLET | Refills: 0 | Status: SHIPPED | OUTPATIENT
Start: 2020-11-11 | End: 2020-12-21

## 2020-11-11 ASSESSMENT — PAIN SCALES - GENERAL: PAINLEVEL: EXTREME PAIN (8)

## 2020-11-11 NOTE — PATIENT INSTRUCTIONS
Plan:    1. Recommended to increase activity while pacing himself  2. Physical Therapy:  Has referral to PHYSICAL THERAPY  3. Clinical Health Psychologist: working regularly with Jonna Farrell  4. Diagnostic Studies:  None  5. Medication Management:    1. Continue OxyContin 10mg BID fill 12/1, start 12/4  2. Continue Flexeril 5-10mg TID PRN  3.  continue oxycodone 5mg tabs of 3/day fill 12/8, start 12/10  6. Further procedures recommended: working toward bilateral lumbar radiofrequency ablation, had LMBB #2 on the left side earlier today.   7. Recommendations to PCP: see above  8. Follow up: 10-12 weeks video visit due to COVID-19 threat  9. States he will be seeing urology in [place] in the near future re: recent issues with severe urinary retention requiring catheterization      ----------------------------------------------------------------  Clinic Number:  [phone]     Call with any questions about your care and for scheduling assistance.     Calls are returned Monday through Friday between 8 AM and 4:30 PM. We usually get back to you within 2 business days depending on the issue/request.    If we are prescribing your medications:    For opioid medication refills, call the clinic or send a [employer] message 7 days in advance.  Please include:    Name of requested medication    Name of the pharmacy.    For non-opioid medications, call your pharmacy directly to request a refill. Please allow 3-4 days to be processed.     Per [place] State Law:    All controlled substance prescriptions must be filled within 30 days of being written.      For those controlled substances allowing refills, pickup must occur within 30 days of last fill.      We believe regular attendance is key to your success in our program!      Any time you are unable to keep your appointment we ask that you call us at least 24 hours in advance to cancel.This will allow us to offer the appointment time to another patient.     Multiple missed  appointments may lead to dismissal from the clinic.

## 2020-11-11 NOTE — TELEPHONE ENCOUNTER
Patient getting medial branch block #2 on the left side today.  We are going to ask for approval of his previous right sided radiofrequency ablation at the same time as we ask for approval for the NEW LEFT side... so that we can do bilateral at the time of the radiofrequency ablation     On the right, he had 60% improvement- last done 4/4/19.    Ashlyn Gamble MD  [facility]

## 2020-11-11 NOTE — NURSING NOTE
Discharge Information    IV Discontiued Time:  NA    Amount of Fluid Infused:  NA    Discharge Criteria = When patient returns to baseline or as per MD order    Consciousness:  Pt is fully awake    Circulation:  BP +/- 20% of pre-procedure level    Respiration:  Patient is able to breathe deeply    O2 Sat:  Patient is able to maintain O2 Sat >92% on room air    Activity:  Moves 4 extremities on command    Ambulation:  Patient is able to stand and walk or stand and pivot into wheelchair    Dressing:  Clean/dry or No Dressing    Notes:   Discharge instructions and AVS given to patient    Patient meets criteria for discharge?  YES    Admitted to PCU?  No    Responsible adult present to accompany patient home?  Yes    Signature/Title:    dariel kennedy RN  RN Care Coordinator  [facility] [place]

## 2020-11-11 NOTE — PATIENT INSTRUCTIONS
[facility]   Medial Branch Block Discharge Instructions      Your procedure was performed by: Dr. Ashlyn Gamble      Medications used include:  Lidocaine  Bupivicaine  Omnipaque      You will need to complete the Pain Scale Log form and return it to us as soon as possible.  Once we have received the form, we will review it and call you to determine the next steps.     The form can be faxed to [phone] or mailed to:   [facility]   [id number]   [place] [address]      You may resume your regular medications    If you were holding your blood thinning medication, please restart taking it: N/A    You may resume your regular activities    Be cautious with walking as numbness and/or weakness in the lower extremities may occur for up to 6-8 hours due to the effects of the anesthetic.    Avoid driving for 6 hours. The local anesthetic could slow your reflexes.     You may shower, however no swimming or tub baths or hot tubs for 24 hours following your procedure.    Your pain will return after the numbing medications have worn off.  You may use your current pain medications as needed.    Unless you have been directed to avoid the use of anti-inflammatory medications (NSAIDS), you may use medications such as ibuprofen, Aleve or Tylenol for pain control if needed.  Some people find it helpful to alternate ibuprofen and Tylenol every 3 hours for a couple of days.    You may use ice packs 10-15 minutes three to four times a day at the injection site for comfort.     Do not use heat to painful areas for 6 to 8 hours. This will give the local anesthetic time to wear off and prevent you from accidentally burning your skin.     If you experience any of the following, call the Pain Clinic during work hours (Monday through Friday 8 am-4:30 pm) at [phone] or the Provider Line after hours at [phone]:  -Fever over 100 degree F  -Swelling, bleeding, redness,  drainage, warmth at the injection site  -Progressive weakness or numbness in your legs or arms  -If lumbar, call if you have a loss of bowel or bladder function  -If cervical, call if you have any unusual headache that is not relieved by Tylenol  -Unusual new onset of pain that is not improving

## 2020-11-11 NOTE — PROGRESS NOTES
[facility]    11/11/2020        Chief complaint: lower  back pain. Bilateral knee pain, hand pain      Interval history:   Jailene Breen is a 7# year old male is known to me for   Lumbar spondylosis, pain is worse with extension and rotation indicating a facetogenic component to pain  Lumbar degenerative disc disease  SI joint pain  Ankylosing spondylitis  Myofascial pain  Chronic pain syndrome  PMHx includes: Panic attacks, back pain, arthritis, anxiety, ankylosing spondylitis  PSHx includes: Right knee surgery ×2, left foot surgery right knee arthroscopy        Recommendations/plan at the last visit on 9/25/2020 included:  1. Recommend patient check in with Primary Care Provider given significant upper respiratory infection (deep wet cough, body aches)  2. Recommended to increase activity while pacing himself  3. Physical Therapy:  Has referral to PHYSICAL THERAPY  4. Clinical Health Psychologist: working regularly with Jonna Bud  5. Diagnostic Studies:  None  6. Medication Management:    1. Continue OxyContin 10mg BID.   2. Continue Flexeril 5-10mg TID PRN  3. Continue  oxycodone 5mg tabs max 4/day for next 7 days, then reduce to max of 3/day  7. Further procedures recommended: 2nd lumbar medial branch block on the left as scheduled  8. Recommendations to PCP: see above  9. Follow up: 8-10 weeks in-person visit        Since his last visit, Jailene Breen reports:    Interval history 11/11/2020  -he has been having issues with emptying his bladder and he has had 2 Carrasco catheters in the near future  -he is trying to get in with a [place] [facility]rologist.  -we discussed how opiate medication can cause urinary hesitancy  -he feels like the urinary symptoms began a couple of weeks after he began lexapro.  -he states he recently found out that his biological dad has had issues with his bladder.   -he had 2nd lumbar medial branch block done today with Dr. Ashlyn Gamble, we are  working toward lumbar RFA..       Interval history 2020  -He has been sick 10 days ago,  had been on antibiotics and was not able to take his RA meds. He then felt better and then is now feeling worse again. Had been tested for COVID-19  -how his whole house is not feeling well.   -he is off of his RA meds, and so his pain is worse because he doesn't feel well, he has a really deep cough.  -having 2nd LMBB next week on the left side, then plan to do bilateral lumbar RFA  -he can't sleep due to body aches and then his back pain is worse.   -recently seen by psychiatry for first time this week, placed on escitalopram for depression/anxiety and hydroxyzine for panic/anxiety PRN, Les tells me that the psychiatrist wants him to stop using the clonazepam.       Interval history 6/10/2020  -having bilateral low back pain that radiates into the groin at times  -having multiple joint pain from RA, his RA flare is better as he is back on the Enbrel now  -he would love to get a new bed as he has issues getting comfortable in bed.   -he is having more left sided low back pain, worse with lumbar extension and extension and rotation. He is interested in pursing possible left sided lumbar RFA.      Interval 2020  -his 28 year old niece overdosed and  last Saturday, she had a 12,5 and 1 year old.   -he is going to her  today  -he did get his Enbrel yesterday  -he is going to see a new rheumatologist in July with the  [facility] [employer] [facility]    -he says his pain is now a bit better  -he has multiple joint pain from RA and chronic low back pain        At this point, the patient's participation with our multidisciplinary team includes:  The patient has been compliant with the program.    PT - has seen Cyndee White, none recently  Health Psych - worked with  Padilla Keene, last on 2018.  Working with Jonna Farrell PhD and been seen >8 times since 2020         Pain scores:    Pain intensity on average is 8-9 on  "a scale of 0-10.    Range is 5-10/10.   Pain right now is 7/10.   Pain is described as \"shooting, throbbing, penetrating, burning, and stabbing.\"  Pain is constant in nature    Current pain relevant medications:      -nortriptyline 50mg at bedtime (helpful)  -Enbrel 50mg/mL   -ibuprofen 800mg TID PRN (using 3 times daily-helpful)  -Tylenol 500mg using 1000mg TID (unsure if helpful)  -Maxalt 10mg PRN migraine (helpful for migraine--he does have visual aura)  -OxyContin 10mg BID (helpful)  -Flexeril 10mg TID PRN (helpful)  -naloxone nasal spray PRN for accidental opiate overdose        Other pertinent medications:   -clonazepam 1mg tab, may take 0.5-1mg BID PRN anxiety (helpful, has been using infrequently)  -Escitalopram 10mg (just started this with psychiatry 3 days ago)  -hydroxyzine 25mg once daily as needed for anxiety/panic (just prescribed by psychiatry 3 days ago)      Previous Medications:   OPIATES: Oxycodone (helpful), Fentanyl (100mcg/hr patch helpful), hydrocodone (itching), Tramadol (not helpful)   NSAIDS: ibuprofen (not helpful), Aleve (not helpful)  MUSCLE RELAXANTS: methocarbamol (somewhat helpful), Flexeril (somewhat helpful), tizanidine (unsure if helpful), metaxalone (unsure), SOMA (helpful)  ANTI-MIGRAINE MEDS: Maxalt (helpful for migraine), Imitrex injection (somewhat helpful)  ANTI-DEPRESSANTS: Prozac (helpful), Cymbalta (felt weird on it), nortriptyline (unsure if helpful), Buspar (unsure), Remeron (blacked out), bupropion (not helpful for smoking cessation)  SLEEP AIDS: Ambien (helpful)  ANTI-CONVULSANTS: gabapentin (felt odd on this medication, unsure of dose), Lyrica (not helpful for 4 months, unsure of dose), Topamax (not helpful, he felt weird on it)   TOPICALS: Lidocaine patches (not helpful), Voltaren gel (not helpful)  Other meds: Tylenol (unsure if helpful)        Other treatments have included:   Jailene Breen has been seen at a pain clinic in the past.  [facility]  PT: tried, not helpful  Chiropractic: tried, not helpful  Acupuncture: none  TENs Unit: tried, helpful     Injections:     -has had injections at outside clinics  -has had epidural injections for low back pain (one was helpful)  -he has had lumbar medial branch blocks at [facility] and he was going to have lumbar radiofrequency ablation (did not do this due to cost)  -4/3/2017 right LMBBs with Dr. Ashlyn Gamble (helpful)  -4/26/2017 right LMBBs with Dr. Ashlyn Gamble (helpful)  -5/31/2017 right L3, L4, L5 RFA with Dr. Ashlyn Gamble (good relief for over 6 months)  -3/15/2018 right L5 transforaminal MAKAYLA with Dr. Ashlyn Gamble (not helpful)  -5/10/2018 trigger point injections with Dr. Ashlyn Gamble(somewhat helpful).  -7/12/2018 Right L3, L4, L5 RFA with Dr. Ashlyn Gamble (helpful).  -9/20/2018 Left piriformis injections, bilateral gluteal trigger point with Dr. Gamble (helpful).  -1/31/2019 right L2-3, L3-4 and L4-5 facet joint injections with Dr. Ashlyn Gamble (somewhat helpful)  4/4/2019  right L3, L4, L5/sacral ala lumbar radiofrequency ablation with Dr. Gamble (helpful over right side)  6/28/2019 Bilateral facet joint injections at l4-5, L5-S1 with Dr. Holley (only helpful for 7 days)  -2/12/2020 right Q0-C8-T3-sacral ALA RFA done with Dr. Ashlyn Gamble (helpful)  -8/26/2020 left L3-5 lumbar medial branch blocks #1 with Dr. Ashlyn Gamble (very helpful)  -11/11/2020 left L3-5 lumbar medial branch blocks #2 with Dr.. Ashlyn Gamble (too soon to tell, done today)           THE 4 A's OF OPIOID MAINTENANCE ANALGESIA    Analgesia: opiates are helpful    Activity: activity impeded without opiate    Adverse effects: none    Adherence to Rx protocol: yes          Side Effects: no side effect  Patient is using the medication as prescribed: YES    Medications:  Current Outpatient Medications   Medication Sig Dispense Refill     albuterol (PROAIR HFA/PROVENTIL HFA/VENTOLIN HFA) 108 (90 Base) MCG/ACT inhaler  Inhale 2 puffs into the lungs every 6 hours as needed for shortness of breath / dyspnea 1 Inhaler 0     atenolol (TENORMIN) 25 MG tablet Take 25 mg by mouth daily       benzonatate (TESSALON) 200 MG capsule Take 1 capsule (200 mg) by mouth 3 times daily as needed for cough 20 capsule 0     clonazePAM (KLONOPIN) 1 MG tablet TAKE 1/2-1 TABLETS BY MOUTH ONCE DAILY AS NEEDED FOR ANXIETY 30 tablet 1     cyclobenzaprine (FLEXERIL) 10 MG tablet Take 0.5-1 tablets (5-10 mg) by mouth 3 times daily as needed for muscle spasms Caution sedation 60 tablet 2     escitalopram (LEXAPRO) 10 MG tablet Take 1 tablet (10 mg) by mouth daily 30 tablet 1     etanercept (ENBREL SURECLICK) 50 MG/ML autoinjector Inject 50 mg Subcutaneous once a week 1 kit 2     folic acid (FOLVITE) 1 MG tablet Take 1 tablet (1 mg) by mouth daily 90 tablet 3     guaiFENesin-codeine (ROBITUSSIN AC) 100-10 MG/5ML solution Take 5-10 mLs by mouth every 4 hours as needed for cough 180 mL 0     hydrOXYzine (ATARAX) 25 MG tablet Take 1 tablet (25 mg) by mouth daily as needed (for panic and severe anxiety) 30 tablet 1     IBUPROFEN PO Take 800 mg by mouth every 6 hours as needed for moderate pain Reported on 5/19/2017       methotrexate 2.5 MG tablet Take 4 tablets (10 mg) by mouth every 7 days Labs every 8 - 12 weeks for refills. 16 tablet 2     naloxone (NARCAN) 4 MG/0.1ML nasal spray Spray 1 spray (4 mg) into one nostril alternating nostrils as needed for opioid reversal every 2-3 minutes until assistance arrives 0.2 mL 0     nicotine (NICORETTE) 2 MG gum Place 2 each (4 mg) inside cheek as needed for smoking cessation 240 each 12     oxyCODONE (OXYCONTIN) 10 MG 12 hr tablet Take 1 tablet (10 mg) by mouth every 12 hours Fill 11/3/2020. Begin 11/5/2020 60 tablet 0     oxyCODONE (ROXICODONE) 5 MG tablet Take 1 tablet (5 mg) by mouth every 6 hours as needed for severe pain Max of 3 tabs/day.  Okay to dispense 11/9/20 and start 11/11/2020 90 tablet 0     predniSONE  (DELTASONE) 20 MG tablet Take 1 tablet (20 mg) by mouth daily 5 tablet 0     tamsulosin (FLOMAX) 0.4 MG capsule Take 1 capsule (0.4 mg) by mouth daily (Patient not taking: Reported on 11/11/2020) 7 capsule 0       Medical History: any changes in medical history since they were last seen? No    Social History:    Home situation: , has 2 grown children  Occupation/Schooling: currently unemployed, worked as a  (unemployed since 3/1/2017). 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  Uses nicotine gum  If yes, was cessation counseling offered?  None needed        Review of Systems:    The 14 system ROS was reviewed with patient and is positive for:  Constitutional: fever/chills, fatigue, weight gain, weight loss  Eyes/Head: headache, dizziness  ENT: ringing in ears (in the left ear, at baseline)  Allergy/Immune: allergies  Skin: itching, rash, hives  Hematologic: easy bruising  Respiratory: cough, wheezing, shortness of breath  Cardiovascular: swelling in feet, fainting, palpitations, chest pain  GI: abdominal pain, nausea, vomiting, diarrhea, constipation  Endocrine: steroid use  Musculoskeletal:  joint pain, arthritis, stiffness, gout, back pain, neck pain  Urinary: frequency, urgency, incontinence, hesitancy, urinary retention requiring catheterization x 2  Neurologic: weakness, numbness/tingling, seizure, stroke, memory loss  Mental health: depression, anxiety, stress, suicidal ideation          Physical Exam:     Vital signs: There were no vitals taken for this visit.     Behavioral observations:  Awake, alert. Cooperative.   Pulm: respirations easy and unlabored. Able to speak in full sentences without SOB or cough noted.                [place] Pres[place]:  [place]  11/11/2020- no concerning fills.  Susana Pike  SANDRA GRANT CNP, JAQUELINP  [facility] [place] [facility]        DIRE Score for selecting candidates for long term opioid analgesia for chronic pain:  Diagnosis  2  Intractablility  2  Risk    Psychological health  2    Chemical health  2    Reliability  2    Social support  2  Efficacy  2    Total DIRE Score = 14. Note that  7-13 predicts poor outcome (compliance and efficacy) from opioid prescribing; 14-21 predicts good outcome (compliance and efficacy)  from opioid prescribing.      Assessment:     1. Lumbar facet joint pain Left >> right   2. DDD, lumbar  3. Lumbosacral spondylosis without myelopathy or radiculopathy  4. Chronic low back pain without sciatica  5. Ankylosing spondylitis of lumbosacral region  6. Myofascial pain  7. Chronic pain syndrome  8. Chronic pain of both knees  9. RA involving multiple joints    10. Encounter for long-term opiate analgesic use  11. PMHx includes: Panic attacks, back pain, arthritis, anxiety, ankylosing spondylitis  12. PSHx includes: Right knee surgery ×2, left foot surgery right knee arthroscopy      Plan:    1. Recommended to increase activity while pacing himself  2. Physical Therapy:  Has referral to PHYSICAL THERAPY  3. Clinical Health Psychologist: working regularly with Jonna Farrell  4. Diagnostic Studies:  None  5. Medication Management:    1. Continue OxyContin 10mg BID fill 12/1, start 12/4  2. Continue Flexeril 5-10mg TID PRN  3.  continue oxycodone 5mg tabs of 3/day fill 12/8, start 12/10  6. Further procedures recommended: working toward bilateral lumbar radiofrequency ablation, had LMBB #2 on the left side earlier today.   7. Recommendations to PCP: see above  8. Follow up: 10-12 weeks video visit due to COVID-19 threat  9. States he will be seeing urology in [place] in the near future re: recent issues with severe urinary retention requiring catheterization      Video visit using [employer] lasted 25 minutes    Susana GRANT RN CNP, FNP  [facility]  [place] [facility]

## 2020-11-17 ENCOUNTER — MYC MEDICAL ADVICE (OUTPATIENT)
Dept: PALLIATIVE MEDICINE | Facility: CLINIC | Age: 55
End: 2020-11-17

## 2020-11-17 NOTE — TELEPHONE ENCOUNTER
Spoke with patient and he reports he put the pain diary for the MBB #2 in the mail yesterday Monday 11/16/20. Let hime know we would submit that information to his insurance company when we received it.     KERLINE RivasN, RN  Care Coordinator  [facility] [place]

## 2020-11-18 NOTE — TELEPHONE ENCOUNTER
Pt had a left Lumbar  medial branch block # 2 on 11/11/20.  The post medial branch block form was  received.      Max % of pain relief from medial branch block #1:            Max relief from block is:    At rest:                 40%  Left fact load:       60%  Right facet load:  na  Normal activity:    60%    Max relief from block #2 is:    At rest:                 63%  Left fact load:       60%  Right facet load:  %  Normal activity:    60%    Insurance:  [employer] [place]      Does pt have adequate relief insurance-wise to proceed to radiofrequency ablation?  Yes:      Physical therapy:                  Last done in?:  Not done yet, Writer sent Pt MyChart about this.     How many sessions?:  -.      Where was it done?  Order by Susana in June    Called pt and informed him/her that insurance would be checked and will be  called once we get a response.  Done    The pain diary was faxed to Yasemin Eldridge for insurance review.    Pt is due for bilateral lumbar radiofrequency ablation. (see Dr. Gamble's note for more information) If this is approved we are hoping to do a b/l lumbar radiofrequency ablation after block #2 of L side is done.    Routed to Yasemin Eldridge to check insurance coverage.

## 2020-11-18 NOTE — TELEPHONE ENCOUNTER
Per patient myChart message:  From: Jamison Breen      Created: 11/18/2020 9:21 AM      I thought someone was going to call and schedule and it didn't happen and I forgot about it.      _________________________________    From: Cha Molina RN      Created: 11/18/2020 11:08 AM        Devante Swift,  The order for PT was placed back in June so the schedulers would not have known to call you recently to schedule this.  You can call to schedule whenever you are ready. When you are done w/ PT visit #4 let us know.     Cha Webb RN-BSN  [place] [facility]

## 2020-11-19 NOTE — TELEPHONE ENCOUNTER
Patient is scheduled for PT carolyn MILLAN    [facility]

## 2020-11-21 ENCOUNTER — MYC REFILL (OUTPATIENT)
Dept: PSYCHIATRY | Facility: CLINIC | Age: 55
End: 2020-11-21

## 2020-11-21 ENCOUNTER — MYC MEDICAL ADVICE (OUTPATIENT)
Dept: PSYCHIATRY | Facility: CLINIC | Age: 55
End: 2020-11-21

## 2020-11-21 DIAGNOSIS — F41.1 GAD (GENERALIZED ANXIETY DISORDER): ICD-10-CM

## 2020-11-21 DIAGNOSIS — F33.1 MAJOR DEPRESSIVE DISORDER, RECURRENT EPISODE, MODERATE (H): ICD-10-CM

## 2020-11-21 RX ORDER — ESCITALOPRAM OXALATE 10 MG/1
10 TABLET ORAL DAILY
Qty: 30 TABLET | Refills: 1 | Status: CANCELLED | OUTPATIENT
Start: 2020-11-21

## 2020-11-23 DIAGNOSIS — M06.9 RHEUMATOID ARTHRITIS (H): Primary | ICD-10-CM

## 2020-11-23 RX ORDER — ESCITALOPRAM OXALATE 10 MG/1
10 TABLET ORAL DAILY
Qty: 30 TABLET | Refills: 0 | Status: SHIPPED | OUTPATIENT
Start: 2020-11-23 | End: 2020-12-15

## 2020-11-23 NOTE — TELEPHONE ENCOUNTER
Received RF request from patient    Last seen: 9/21/20  RTC: 4 weeks  Cancel: none  No-show: none  Next appt: none scheduled     Medication requested: escitalopram (LEXAPRO) 10 MG tablet  Directions: Take 1 tablet (10 mg) by mouth daily   Qty: 30  Last Rx written 9/21/20 for 30 ds with 1 rf       Plan per 9/21 virtual visit:    - Discontinue clonazepam   - Start escitalopram 10mg daily   - Start hydroxyzine 25mg daily as needed for anxiety and panic attacks       Pended 30 ds and routed to Dr. Sage to sign off due to patient is outside of RTC and has no follow up appointment scheduled.    Separate message sent to scheduling to contact patient for an appointment.

## 2020-11-24 ENCOUNTER — MYC REFILL (OUTPATIENT)
Dept: FAMILY MEDICINE | Facility: CLINIC | Age: 55
End: 2020-11-24

## 2020-11-24 ENCOUNTER — VIRTUAL VISIT (OUTPATIENT)
Dept: PALLIATIVE MEDICINE | Facility: CLINIC | Age: 55
End: 2020-11-24
Payer: COMMERCIAL

## 2020-11-24 DIAGNOSIS — M06.9 RA (RHEUMATOID ARTHRITIS) (H): Primary | ICD-10-CM

## 2020-11-24 DIAGNOSIS — M06.9 RHEUMATOID ARTHRITIS INVOLVING MULTIPLE SITES, UNSPECIFIED RHEUMATOID FACTOR PRESENCE: ICD-10-CM

## 2020-11-24 DIAGNOSIS — M47.817 LUMBOSACRAL SPONDYLOSIS WITHOUT MYELOPATHY: ICD-10-CM

## 2020-11-24 DIAGNOSIS — M54.59 LUMBAR FACET JOINT PAIN: Primary | ICD-10-CM

## 2020-11-24 DIAGNOSIS — G89.29 CHRONIC BILATERAL LOW BACK PAIN WITHOUT SCIATICA: ICD-10-CM

## 2020-11-24 DIAGNOSIS — M54.50 CHRONIC BILATERAL LOW BACK PAIN WITHOUT SCIATICA: ICD-10-CM

## 2020-11-24 DIAGNOSIS — M06.9 RHEUMATOID ARTHRITIS INVOLVING MULTIPLE SITES, UNSPECIFIED WHETHER RHEUMATOID FACTOR PRESENT (H): ICD-10-CM

## 2020-11-24 PROCEDURE — 97161 PT EVAL LOW COMPLEX 20 MIN: CPT | Mod: GP | Performed by: PHYSICAL THERAPIST

## 2020-11-24 PROCEDURE — 97112 NEUROMUSCULAR REEDUCATION: CPT | Mod: GP | Performed by: PHYSICAL THERAPIST

## 2020-11-25 ENCOUNTER — MYC MEDICAL ADVICE (OUTPATIENT)
Dept: FAMILY MEDICINE | Facility: CLINIC | Age: 55
End: 2020-11-25

## 2020-11-25 ENCOUNTER — MYC MEDICAL ADVICE (OUTPATIENT)
Dept: PALLIATIVE MEDICINE | Facility: CLINIC | Age: 55
End: 2020-11-25

## 2020-11-25 DIAGNOSIS — F17.200 TOBACCO USE DISORDER: Primary | ICD-10-CM

## 2020-11-25 NOTE — PROGRESS NOTES
"PHYSICAL THERAPY INITIAL EVALUATION and PLAN OF CARE    Patient Name:  Jailene Breen (\"Les\")  : 1965  MRN:[id number]    The patient has been notified of the following:  \"This virtual visit will be conducted between you and your provider.  We have found that certain health care needs can be provided without the need for physical presence.  This service lets us provide the care you need with a virtual visit.\"    Due to external, as well as internal  Health [place] management of the COVID 19 Virus, Jailene Breen, was not seen in our clinic.  As a substitution, we implemented a virtual visit to manage this patient's condition utilizing the [employer] virtual visit platform.  The provider, Asif Saldana PT, reviewed the patient's chart and spoke with the patient to determine the following telemedicine visit is appropriate and effective for the patient's care.    The following type of visit was completed:  Video Visit:  The [employer] platform uses a synchronous HIPAA compliant video stream for this encounter.  Video start time:  4:30pm  Video end time:  5:25pm  Provider location:  [place] [facility] [place]  Patient location: Home  Patient has given verbal consent for video visit? Yes      SUBJECTIVE:  PRESENTATION AND ETIOLOGY  Chief Complaint: LBP limiting the ability to perform activities of daily living.   Pt also c/o multiple joint pain, RA, SI joint pain.    Onset / Etiology:  About 4 years ago    Pattern Since Onset: Worsened    Pertinent Medical  / Surgical History: Epic Snapshot Reviewed:  Contributing factors to the severity / complexity of the patient's chief complaint include: see providers notes    Pain:  Frequency: Constant or Activity Dependent     LEVEL OF FUNCTION AT START OF CARE  Current Aggrevating Activities / Functional Limitations: walk 10', sit 30', stand 20-30'    Patient's selected goals for physical therapy: walk longer, tolerate daily routine better, sleep better    CURRENT / " PREVIOUS INTERVENTION(S):     Relieving Activities / Self Care / Exercise: no regular exercise routine    Previous / Current therapies for current chief complaint: traditional PT in past    DEMOGRAPHICS  Employment Status: not working    Social Support: lives with wife, has two adult children    ===============================================================  OBJECTIVE:  POSTURE:  Observation: Guarded postural positioning, hyperactive UT, B.  Note some jaw clenching    GAIT, LOCOMOTION, and BALANCE:  Gait and Locomotion: Antalgic: very guarded alma in upper trunk    RANGE OF MOTION:   Active: lumbar flex 25%, ext 5% motion     MUSCLE PERFORMANCE:   Strength: Declined request for SLS.      ===============================================================  Today's Treatment:  Initial evaluation  Neuromuscular Reeducation:   Posture  and Kinesthetic Body Awareness - ed on neutral spine  Ed on chronic pain PT POC, walking program/equivalent  ===============================================================  ASSESSMENT:  Physical Therapy Diagnosis: Musculoskeletal Patterns    Patient requires PT intervention for the following impairments: Limited knowledge of condition and / or self care - inability to control symptoms, Impaired gait / weight bearing tolerance, Impaired posture / muscle imbalance, Impaired static and / or dynamic balance, Joint hypomobility, Muscle strength and endurance limitations, Pain and Deconditioning    Anticipated Goals and Expected Outcomes:EDUCATION AND SELF CARE  Independent and safe with home exercise / self care program in 6 week(s).  Good working knowledge of posture principles / joint protection in 6 week(s).  FUNCTIONAL MOBILITY  Ambulation without increased pain or symptoms for community distances on all surfaces in 12 week(s).  ADL'S  Standing tolerance 45 minutes without increased pain or symptoms in 12 week(s).  Sitting tolerance 45 minutes without increased pain or symptoms in 12  week(s).  Homemaking / Yardwork without increased pain or symptoms in 12 week(s).    Rehab potential for achieving goals: fair.    ===============================================================  PLAN:   Patient will benefit from skilled physical therapy consisting of: neuromuscular reeducation of: movement, balance, coordination, kinesthetic sense, posture and proprioception for sitting and / or standing activities, education in self care / home management training to include instruction in: joint protection principles and symptom control techniques, therapeutic activities to achieve improved functional performance in: daily actvities and therapeutic exercise to develop: strength and endurance, joint mobility and joint stability    Assessment will be ongoing with changes in treatment as indicated.  Benefits/risks/alternatives to treatment have been reviewed and the patient has been instructed to contact this office if there are any questions or concerns.  This plan of care has been discussed with the patient and the patient is in agreement.     Frequency / Duration:  Patient will be seen for a total of 8-12 visits; at a frequency of every 1-3 weeks.    Total Visit Time: 50  minutes            Asif Saldana          PT                                Date:  11/24/2020    ====================================================     PRESENT: NA    MULTIDISCIPLINARY PATIENT / FAMILY EDUCATION RECORD  Department:  Physical Therapy    Readiness to Learn: Ability to understand verbal instructions,Ability to understand written instructions,Knowledge of educational needs / treatment plan  Specific Barriers to Learning: None  Referrals: None  Learning Needs: Rehabilitation techniques to improve functional independence  Who: Patient  How: Demonstration,Verbal instructions,Written instructions  Response: Appropriate verbal response,Asked questions,Demonstrated ability,Verbalized recall / understanding

## 2020-11-25 NOTE — TELEPHONE ENCOUNTER
Routing refill request to provider for review/approval because:  4 mg nicotine not on medication list.    Ida Hirsch RN, [facility] Triage

## 2020-11-27 NOTE — TELEPHONE ENCOUNTER
Per patient myChart message:  From: Jamison Breen      Created: 11/25/2020 3:38 PM      Hello, I was just checking with my insurance company to make sure the prior authorization is updated and they said they have non on file. Could you please send to them,  thanks.      __________  Mychart sent to pt:  From: Cha Molina RN      Created: 11/27/2020 10:51 AM      Devante Swift.  What medication are you referring to that needs a prior authorization?     SANDRA Eubanks-BSN  [place] [facility]

## 2020-11-27 NOTE — TELEPHONE ENCOUNTER
Cindy Mckeon         4:13 PM  Note     Prior Authorization Approval     Authorization Effective Date: 6/1/2020  Authorization Expiration Date: 12/1/2020  Medication: oxyCODONE (OXYCONTIN) 10 MG 12 hr tablet--APPROVED            We don't know if PA is required until pharmacy run it on 12/01/2020.   Called pt to let him know, we have to wait from pharmacy if PA is required for oxyCODONE (OXYCONTIN) 10 MG 12 hr tablet.     Nicolasa Krueger MA

## 2020-11-30 ENCOUNTER — MYC MEDICAL ADVICE (OUTPATIENT)
Dept: PALLIATIVE MEDICINE | Facility: CLINIC | Age: 55
End: 2020-11-30

## 2020-11-30 DIAGNOSIS — F17.200 SMOKING ADDICTION: ICD-10-CM

## 2020-11-30 NOTE — TELEPHONE ENCOUNTER
A prior authorization is needed for the oxycontin 10mg  The current prior authorization expires 12/1/20.    [employer] [place]    Telephone:  [phone]  ID#: [id number]    Routed to the prior authorization team.    Cha RN-BSN  [facility]

## 2020-11-30 NOTE — TELEPHONE ENCOUNTER
Per patient Halldis message:  From: Jamison Breen      Created: 11/30/2020 8:47 AM      Hello, I just spoke with my insurance company today and they said my pre authorization will end tomorrow.  They told me to see it you can allow it to be filled today because today is the last valid day and it will take over a week to get the new pre authorization set up.      _____________    Per the 11/25/20 [employer] encounter:  Prior Authorization Approval   Authorization Effective Date: 6/1/2020  Authorization Expiration Date: 12/1/2020  Medication: oxyCODONE (OXYCONTIN) 10 MG 12 hr tablet--APPROVED    Pt has not had any compliance issues recently.    Called [facility] and ok'd the fill for the oxycontin today.    information for prior authorization taken.      See the other encounter for the prior authorization information.    Routed to provider for NASREEN.    Cha RN-BSN  [facility]

## 2020-11-30 NOTE — TELEPHONE ENCOUNTER
PA Initiation    Medication: oxyCODONE (OXYCONTIN) 10 MG 12 hr tablet   Insurance Company: [employer] [place] - Phone [phone] Fax [phone]  Pharmacy Filling the Rx: [id number] - [place] - [address]  Filling Pharmacy Phone: [phone]  Filling Pharmacy Fax: [phone]  Start Date: 11/30/2020

## 2020-11-30 NOTE — TELEPHONE ENCOUNTER
Prior Authorization Not Needed per Insurance    Medication: oxyCODONE (OXYCONTIN) 10 MG 12 hr tablet--NO PA NEEDED  Insurance Company: [employer] [place] - Phone [phone] Fax [phone]  Expected CoPay:      Pharmacy Filling the Rx: [id number] - [place] - [address]  Pharmacy Notified: Yes  Patient Notified: Yes **Instructed pharmacy to notify patient when script is ready to /ship.**      Per insurance rep, pharmacy filled today and the medication doesn't require a PA going forward even after current PA ends on 12/1/20.

## 2020-12-01 NOTE — TELEPHONE ENCOUNTER
Information noted. Thanks.  Susana GRANT, SANDRA CNP, FNP  [facility]  [facility]

## 2020-12-01 NOTE — TELEPHONE ENCOUNTER
Information noted. Agree with plan.    Susana GRANT, RN CNP, FNP  [facility]

## 2020-12-02 NOTE — TELEPHONE ENCOUNTER
Refused noting to see the 11/30/2020 Rx.    Ida Hirsch RN, [facility] [place] [facility] Triage

## 2020-12-08 ENCOUNTER — VIRTUAL VISIT (OUTPATIENT)
Dept: PALLIATIVE MEDICINE | Facility: CLINIC | Age: 55
End: 2020-12-08
Payer: COMMERCIAL

## 2020-12-08 DIAGNOSIS — M47.817 LUMBOSACRAL SPONDYLOSIS WITHOUT MYELOPATHY: ICD-10-CM

## 2020-12-08 DIAGNOSIS — G89.29 CHRONIC BILATERAL LOW BACK PAIN WITHOUT SCIATICA: ICD-10-CM

## 2020-12-08 DIAGNOSIS — M54.59 LUMBAR FACET JOINT PAIN: Primary | ICD-10-CM

## 2020-12-08 DIAGNOSIS — M54.50 CHRONIC BILATERAL LOW BACK PAIN WITHOUT SCIATICA: ICD-10-CM

## 2020-12-08 PROCEDURE — 97110 THERAPEUTIC EXERCISES: CPT | Mod: GP | Performed by: PHYSICAL THERAPIST

## 2020-12-08 PROCEDURE — 97112 NEUROMUSCULAR REEDUCATION: CPT | Mod: GP | Performed by: PHYSICAL THERAPIST

## 2020-12-11 NOTE — PROGRESS NOTES
"Patient Name: Jailene Breen      YOB: 1965     Medical Record Number: [id number]  Diagnosis:    Lumbar facet joint pain  Lumbosacral spondylosis without myelopathy  Chronic bilateral low back pain without sciatica     The patient has been notified of the following:  \"This virtual visit will be conducted between you and your provider.  We have found that certain health care needs can be provided without the need for physical presence.  This service lets us provide the care you need with a virtual visit.\"     Due to external, as well as internal [facility] management of the COVID 19 Virus, Jailene Breen, was not seen in our clinic.  As a substitution, we implemented a virtual visit to manage this patient's condition utilizing the [employer] virtual visit platform.  The provider, Asif Saldana PT, reviewed the patient's chart and spoke with the patient to determine the following telemedicine visit is appropriate and effective for the patient's care.     The following type of visit was completed:  Video Visit:  The [employer] platform uses a synchronous HIPAA compliant video stream for this encounter.  Video start time:  4:10pm  Video end time:  4:47pm  Provider location:  [facility] [place]  Patient location: Home  Patient has given verbal consent for video visit? Yes    Visit Info Length of Visit: 37  Arrived: started late   Exercise/Activity Education Instruction:  Postural Training/Anatomy  Pacing/Energy Conservation  Home Program  Self Care  Activity:  Therapeutic Exercise 20': Aerobic Conditioning (*see \"Strength/Functional Actitivities Record for details of exercises performed)  Ed on components of aerobic ex #4 - seated walk simulation and HR check  Stretching:  Upper Ext  bilateral, Lower Ext  Bilateral  Added stretch for thor ext  Postural Training:  standing, sitting  Slump, overcorrect then find his neutral spine range    Neuro re-ed 15':  Ed on neutral spine in all " positions  Diaphragm breathing ed  Ed on finding his most stable, home base position- tripod feet, soft knees, neutral spine, thumbs fwd..  Ed on sequence of' breathe, posture, move' concept   Notes: Pt having a pain flare today but willing to engage in chronic pain PT session and presents with positive attitude and effort. Slowly progressing toward goals.   Demonstrates/Verbalizes Technique: 2, 3 (1= poor technique-difficulty performing exercises,significant cues required; 5= good technique-performs exercises without cues)  Body Awareness: 2, 3 (1=low awareness; 5=high awareness)  Posture/Stability: 2, 3 (1= poor posture, stability; 5= good posture, stability)   Motivational Level:   Ask questions, Eager to learn, Cooperative and Distracted, in pain Participation:  Full   Patient Satisfaction:  satisfied   Response to Teaching:   cooperative and needs reinforcement  Factors that affect learning: None   Plan of Care  Cont PT to support reactivation and integration of self regulation pain management skills.   Therapist: Asif Saldana, PT                 Date: 12/8/2020

## 2020-12-14 ENCOUNTER — MYC MEDICAL ADVICE (OUTPATIENT)
Dept: RHEUMATOLOGY | Facility: CLINIC | Age: 55
End: 2020-12-14

## 2020-12-14 DIAGNOSIS — M06.9 RHEUMATOID ARTHRITIS INVOLVING MULTIPLE SITES (H): ICD-10-CM

## 2020-12-14 DIAGNOSIS — M06.9 RHEUMATOID ARTHRITIS INVOLVING MULTIPLE SITES, UNSPECIFIED WHETHER RHEUMATOID FACTOR PRESENT (H): Primary | ICD-10-CM

## 2020-12-14 RX ORDER — METHOTREXATE 2.5 MG/1
TABLET ORAL
Qty: 16 TABLET | Refills: 2 | OUTPATIENT
Start: 2020-12-14

## 2020-12-14 NOTE — TELEPHONE ENCOUNTER
Pt called and made appointment for 1/21/21. He is in need of a refill for his methotrexate he is completely out and will need to take this by next Monday 12/21. Please call pt at [phone].

## 2020-12-14 NOTE — TELEPHONE ENCOUNTER
methotrexate 2.5 MG tablet  Last Written Prescription Date: 8/9/20  Last Fill Quantity: 16,   # refills: 2  Last Office Visit:7/9/20  [facility]  Future Office visit:  none    CBC RESULTS:   Recent Labs   Lab Test 09/10/20  1237   WBC 6.2   RBC 5.26   HGB 16.0   HCT 45.1   MCV 86   MCH 30.4   MCHC 35.5   RDW 13.0          Creatinine   Date Value Ref Range Status   09/10/2020 0.97 0.66 - 1.25 mg/dL Final   ]    Liver Function Studies -   Recent Labs   Lab Test 09/10/20  1237 10/07/16  0948 10/07/16  0948   PROTTOTAL  --   --  7.5   ALBUMIN 3.3*   < > 4.0   BILITOTAL  --   --  0.4   ALKPHOS  --   --  80   AST 27   < > 15   ALT 50   < > 25    < > = values in this interval not displayed.       Routing refill request to provider for review/approval because: labs due now.

## 2020-12-14 NOTE — TELEPHONE ENCOUNTER
Refill request has been sent to the provider and the patient was notified by the refills team.     LETHA Smalls, RN   Medical Specialty Care Coordinator   [facility]

## 2020-12-15 ENCOUNTER — VIRTUAL VISIT (OUTPATIENT)
Dept: PSYCHIATRY | Facility: CLINIC | Age: 55
End: 2020-12-15
Attending: PSYCHIATRY & NEUROLOGY
Payer: COMMERCIAL

## 2020-12-15 DIAGNOSIS — F41.0 PANIC ATTACK: ICD-10-CM

## 2020-12-15 DIAGNOSIS — F41.1 GAD (GENERALIZED ANXIETY DISORDER): ICD-10-CM

## 2020-12-15 DIAGNOSIS — F33.1 MAJOR DEPRESSIVE DISORDER, RECURRENT EPISODE, MODERATE (H): ICD-10-CM

## 2020-12-15 PROCEDURE — 99214 OFFICE O/P EST MOD 30 MIN: CPT | Mod: 95 | Performed by: STUDENT IN AN ORGANIZED HEALTH CARE EDUCATION/TRAINING PROGRAM

## 2020-12-15 RX ORDER — ESCITALOPRAM OXALATE 10 MG/1
20 TABLET ORAL DAILY
Qty: 60 TABLET | Refills: 2 | Status: SHIPPED | OUTPATIENT
Start: 2020-12-15 | End: 2021-02-02

## 2020-12-15 RX ORDER — HYDROXYZINE HYDROCHLORIDE 25 MG/1
25 TABLET, FILM COATED ORAL DAILY PRN
Qty: 30 TABLET | Refills: 2 | Status: SHIPPED | OUTPATIENT
Start: 2020-12-15 | End: 2021-02-02

## 2020-12-15 ASSESSMENT — PATIENT HEALTH QUESTIONNAIRE - PHQ9
10. IF YOU CHECKED OFF ANY PROBLEMS, HOW DIFFICULT HAVE THESE PROBLEMS MADE IT FOR YOU TO DO YOUR WORK, TAKE CARE OF THINGS AT HOME, OR GET ALONG WITH OTHER PEOPLE: VERY DIFFICULT
SUM OF ALL RESPONSES TO PHQ QUESTIONS 1-9: 15
SUM OF ALL RESPONSES TO PHQ QUESTIONS 1-9: 15

## 2020-12-15 ASSESSMENT — PAIN SCALES - GENERAL: PAINLEVEL: EXTREME PAIN (9)
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
## 2020-12-15 NOTE — PROGRESS NOTES
"VIDEO VISIT  Jailene Breen is a 55 year old patient who is being evaluated via a billable video visit.      The patient has been notified of following:   \"This video visit will be conducted via a call between you and your physician/provider. We have found that certain health care needs can be provided without the need for an in-person physical exam. This service lets us provide the care you need with a video conversation. If a prescription is necessary we can send it directly to your pharmacy. If lab work is needed we can place an order for that and you can then stop by our lab to have the test done at a later time. Insurers are generally covering virtual visits as they would in-office visits so billing should not be different than normal.  If for some reason you do get billed incorrectly, you should contact the billing office to correct it and that number is in the AVS .    Video Conference to be completed via:  K[employer]    Patient has given verbal consent for video visit?:  Yes    Patient would prefer that any video invitations be sent by: Text to cell phone: [phone]      How would patient like to obtain AVS?:  "[employer]    AVS SmartPhrase [PsychAVS] has been placed in 'Patient Instructions':  Yes    "

## 2020-12-15 NOTE — PATIENT INSTRUCTIONS
Nice to meet you today Les. As we discussed:   - Increase escitalopram 20mg daily, let us know if you are experiencing significant side effects.  - Let us know if you decide to purchase a light box and we can provide a letter for possible insurance reimbursement.       ------------------------------------------------------------------------    Thank you for coming to the PS[facility].    Lab Testing:  If you had lab testing today and your results are reassuring or normal they will be mailed to you or sent through [employer] within 7 days. If the lab tests need quick action we will call you with the results. The phone number we will call with results is # [phone] (home) . If this is not the best number please call our clinic and change the number.    Medication Refills:  If you need any refills please call your pharmacy and they will contact us. Our fax number for refills is [phone]. Please allow three business for refill processing. If you need to  your refill at a new pharmacy, please contact the new pharmacy directly. The new pharmacy will help you get your medications transferred.     Scheduling:  If you have any concerns about today's visit or wish to schedule another appointment please call our office during normal business hours [phone] (8-5:00 M-F)    Contact Us:  Please call [phone] during business hours (8-5:00 M-F).  If after clinic hours, or on the weekend, please call  [phone].    Financial Assistance [phone]  [facility] Billing [phone]  Central Billing Office, [facility]: [phone]  [facility] Billing [phone]  Medical Records [phone]      MENTAL HEALTH CRISIS NUMBERS:  For a medical emergency please call  911 or go to the nearest ER.     [facility]:   [facility] -[phone]   Crisis Residence [facility] -[phone]   Walk-In Counseling Center [facility] -[phone]   [facility]E 24/7 [facility] -[phone]  (adults)/[phone] (child)  [facility]: [facility] - [phone]      [facility]:   [facility] - [phone]   Walk-in counseling [facility] - [phone]   Walk-in counseling [facility] - [phone]   Crisis Residence [facility] - [phone]  Urgent Care Adult Mental Of[facility]-[phone] mobile unit/ 24/7 crisis line    National Crisis Numbers:   National Suicide Prevention Lifeline: 6-996-870-TALK (223-819-3115)  Poison Control Center - 5-487-961-7499  [employer]/resources for a list of additional resources (SOS)  Trans Lifeline a hotline for transgender people 1-959-558-7043  The [employer] a hotline for LGBT youth 3-992-915-7435  Crisis Text Line: For any crisis 24/7   To: 811369  see www.crisistextline.org  - IF MAKING A CALL FEELS TOO HARD, send a text!         Again thank you for choosing PSY[facility]ATRY CLINIC and please let us know how we can best partner with you to improve you and your family's health.    You may be receiving a survey regarding this appointment. We would love to have your feedback, both positive and negative. The survey is done by an external company, so your answers are anonymous.

## 2020-12-16 ENCOUNTER — VIRTUAL VISIT (OUTPATIENT)
Dept: PALLIATIVE MEDICINE | Facility: CLINIC | Age: 55
End: 2020-12-16
Payer: COMMERCIAL

## 2020-12-16 DIAGNOSIS — M54.59 LUMBAR FACET JOINT PAIN: Primary | ICD-10-CM

## 2020-12-16 DIAGNOSIS — M47.817 LUMBOSACRAL SPONDYLOSIS WITHOUT MYELOPATHY: ICD-10-CM

## 2020-12-16 DIAGNOSIS — M54.50 CHRONIC BILATERAL LOW BACK PAIN WITHOUT SCIATICA: ICD-10-CM

## 2020-12-16 DIAGNOSIS — G89.29 CHRONIC BILATERAL LOW BACK PAIN WITHOUT SCIATICA: ICD-10-CM

## 2020-12-16 PROCEDURE — 97112 NEUROMUSCULAR REEDUCATION: CPT | Mod: GP | Performed by: PHYSICAL THERAPIST

## 2020-12-16 PROCEDURE — 97110 THERAPEUTIC EXERCISES: CPT | Mod: GP | Performed by: PHYSICAL THERAPIST

## 2020-12-16 PROCEDURE — 97530 THERAPEUTIC ACTIVITIES: CPT | Mod: GP | Performed by: PHYSICAL THERAPIST

## 2020-12-16 ASSESSMENT — PATIENT HEALTH QUESTIONNAIRE - PHQ9: SUM OF ALL RESPONSES TO PHQ QUESTIONS 1-9: 15

## 2020-12-17 ENCOUNTER — VIRTUAL VISIT (OUTPATIENT)
Dept: RHEUMATOLOGY | Facility: CLINIC | Age: 55
End: 2020-12-17
Payer: COMMERCIAL

## 2020-12-17 DIAGNOSIS — M06.9 RHEUMATOID ARTHRITIS INVOLVING MULTIPLE SITES, UNSPECIFIED WHETHER RHEUMATOID FACTOR PRESENT (H): ICD-10-CM

## 2020-12-17 DIAGNOSIS — M46.1 SACROILIITIS (H): Primary | ICD-10-CM

## 2020-12-17 PROCEDURE — 99214 OFFICE O/P EST MOD 30 MIN: CPT | Mod: 95 | Performed by: STUDENT IN AN ORGANIZED HEALTH CARE EDUCATION/TRAINING PROGRAM

## 2020-12-17 RX ORDER — FOLIC ACID 1 MG/1
3 TABLET ORAL DAILY
Qty: 180 TABLET | Refills: 3 | Status: ON HOLD | OUTPATIENT
Start: 2020-12-17 | End: 2021-03-24

## 2020-12-17 NOTE — PROGRESS NOTES
"Jailene Breen is a 55 year old male who is being evaluated via a billable telephone visit.      The patient has been notified of following:     \"This telephone visit will be conducted via a call between you and your physician/provider. We have found that certain health care needs can be provided without the need for a physical exam.  This service lets us provide the care you need with a short phone conversation.  If a prescription is necessary we can send it directly to your pharmacy.  If lab work is needed we can place an order for that and you can then stop by our lab to have the test done at a later time.    Telephone visits are billed at different rates depending on your insurance coverage. During this emergency period, for some insurers they may be billed the same as an in-person visit.  Please reach out to your insurance provider with any questions.    If during the course of the call the physician/provider feels a telephone visit is not appropriate, you will not be charged for this service.\"    Patient has given verbal consent for Telephone visit?  Yes    What phone number would you like to be contacted at? [phone]    How would you like to obtain your AVS? [facility]    Subjective : Jailene Breen is a 54 year old male with PMH of anxiety, obesity, HLA-B27 positive ankylosing spondylitis evaluated via a billable telephone visit in consultation at request of Dr Resendez for evaluation of joint pains.     He was diagnosed with seropositive rheumatoid arthritis in 2016, established care with Dr. Raygoza in 5/2016 and was started on methotrexate.  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 He was started on Humira in 12/2016 due to AS.  Discontinued methotrexate in 2/2017 due to lack of improvement. Humira was changed to Enbrel in 4/2019 due to " lack of improvement.  Methotrexate was restarted in July 2020.     Interim History : In the last couple months he had flare up, it starts in his hands, ankles and then in his shoulders. Can not lift his arm up. Both the shoulders were hurting bad. He is having diarrhea for last couple months. He has started a new antidepressant. He is on Enbrel but has not taken Enbrel for a month due to diarrhea issues.  He thinks that Enbrel helps with his joint pains.  He is on methotrexate 4 tablets once a week but cannot tell if it helps him or not.  He has prednisone tablets and take 20 mg once a month when his symptoms worsens.  He does not like to take prednisone since his anxiety symptoms get worse.    He has lumbar degenerative disc disease and gets MAKAYLA.  He has noticed urinary retention issues sporadically.    Review of systems negative except for those mentioned above    Reviewed past medical, surgical, family history    Pertinent labs:   Component      Latest Ref Rng & Units 9/10/2020   WBC      4.0 - 11.0 10e9/L 6.2   RBC Count      4.4 - 5.9 10e12/L 5.26   Hemoglobin      13.3 - 17.7 g/dL 16.0   Hematocrit      40.0 - 53.0 % 45.1   MCV      78 - 100 fl 86   MCH      26.5 - 33.0 pg 30.4   MCHC      31.5 - 36.5 g/dL 35.5   RDW      10.0 - 15.0 % 13.0   Platelet Count      150 - 450 10e9/L 208   % Neutrophils      % 61.8   % Lymphocytes      % 24.0   % Monocytes      % 9.4   % Eosinophils      % 4.2   % Basophils      % 0.6   Absolute Neutrophil      1.6 - 8.3 10e9/L 3.8   Absolute Lymphocytes      0.8 - 5.3 10e9/L 1.5   Absolute Monocytes      0.0 - 1.3 10e9/L 0.6   Absolute Eosinophils      0.0 - 0.7 10e9/L 0.3   Absolute Basophils      0.0 - 0.2 10e9/L 0.0   Diff Method       Automated Method   Creatinine      0.66 - 1.25 mg/dL 0.97   GFR Estimate      >60 mL/min/1.73:m2 89   GFR Estimate If Black      >60 mL/min/1.73:m2 >90   Sed Rate      0 - 20 mm/h 12   CRP Inflammation      0.0 - 8.0 mg/L <2.9   Albumin      3.4  - 5.0 g/dL 3.3 (L)   ALT      0 - 70 U/L 50   AST      0 - 45 U/L 27       Physical exam : Not performed on the telephone visit.    Assessment     Seropositive rheumatoid arthritis  History of ankylosing spondylitis based on MRI pelvis ( 6/2016 )  Rheumatoid factor-78, anti-CCP antibody > 340 - 4/2016  HLA-B27 positive  MRI SI joint-June 2016-partial ankylosis of left SI joint    Seropositive rheumatoid arthritis: He has seropositive rheumatoid arthritis (+ RF, + anti-CCP ) manifesting as a polyarticular symmetric arthritis.  He is on Enbrel 50 mg subcu once a week but has not taken it for a month due to diarrhea.  Once he feels better he can restart it.  He thinks Enbrel helps with his joint pains.  Also started methotrexate last month in November.  He has not noticed any side effects with its use and has not noticed much improvement.  I recommended him to get methotrexate monitoring labs and then based on that methotrexate dose can be increased.  I will continue Enbrel.    Ankylosing spondylitis: He was given a diagnosis of ankylosing spondylitis in 2016 after MRI of the SI joints which showed partial ankylosis.  He is HLA-B27 positive.  I will repeat MRI of the SI joint to look for progression or synovitis.  He does follow-up with pain clinic for lower lumbar back pain and gets epidural spine injections.      Plan     MRI of the SI joint ordered to evaluate for sacroiliitis    Continue methotrexate 4 tablets once a week.  Methotrexate monitoring labs to be done ASAP.  Methotrexate labs to be done every 3 months    Continue Enbrel 50 mg subcu once a week    Follow-up in 2 months.      Phone call duration: 25 minutes    Raghu Parada MD

## 2020-12-21 ENCOUNTER — MYC MEDICAL ADVICE (OUTPATIENT)
Dept: PALLIATIVE MEDICINE | Facility: CLINIC | Age: 55
End: 2020-12-21

## 2020-12-21 DIAGNOSIS — M25.561 CHRONIC PAIN OF BOTH KNEES: ICD-10-CM

## 2020-12-21 DIAGNOSIS — M79.18 MYOFASCIAL PAIN: ICD-10-CM

## 2020-12-21 DIAGNOSIS — M45.7 ANKYLOSING SPONDYLITIS OF LUMBOSACRAL REGION (H): ICD-10-CM

## 2020-12-21 DIAGNOSIS — M47.816 SPONDYLOSIS OF LUMBAR REGION WITHOUT MYELOPATHY OR RADICULOPATHY: ICD-10-CM

## 2020-12-21 DIAGNOSIS — M06.9 RHEUMATOID ARTHRITIS INVOLVING MULTIPLE JOINTS (H): ICD-10-CM

## 2020-12-21 DIAGNOSIS — G89.29 CHRONIC PAIN OF BOTH KNEES: ICD-10-CM

## 2020-12-21 DIAGNOSIS — M54.59 LUMBAR FACET JOINT PAIN: ICD-10-CM

## 2020-12-21 DIAGNOSIS — M25.562 CHRONIC PAIN OF BOTH KNEES: ICD-10-CM

## 2020-12-21 DIAGNOSIS — G89.4 CHRONIC PAIN SYNDROME: ICD-10-CM

## 2020-12-21 DIAGNOSIS — M51.369 DDD (DEGENERATIVE DISC DISEASE), LUMBAR: ICD-10-CM

## 2020-12-21 NOTE — TELEPHONE ENCOUNTER
Routed to the nursing pool and to the MA pool to process refill(s).    Cha Molina, RN-BSN  [place] [facility]

## 2020-12-21 NOTE — TELEPHONE ENCOUNTER
Received [employer] message from patient requesting refill(s) for oxyCODONE (OXYCONTIN) 10 MG 12 hr tablet     Last dispensed from pharmacy on 11/30/2020    Patient's last office/virtual visit by prescribing provider on 11/11/2020  Next office/virtual appointment scheduled for none     checked in the past 6 months? YES If no, print current report and give to RN    Last urine drug screen date 1/21/2020  Current opioid agreement on file? Yes Date of opioid agreement: 1/21/2020    E-prescribe to:    [id number] - [place] - [address]    Will route to nursing [place] for review and preparation of prescription(s).

## 2020-12-21 NOTE — TELEPHONE ENCOUNTER
Medication refill information reviewed.     Due date for oxyCODONE (OXYCONTIN) 10 MG 12 hr tablet is 1/3/21     Prescriptions prepped for review.     Will route to provider.     Josias Lopez RN  Care Coordinator   [facility] [place]

## 2020-12-22 ENCOUNTER — MYC MEDICAL ADVICE (OUTPATIENT)
Dept: PALLIATIVE MEDICINE | Facility: CLINIC | Age: 55
End: 2020-12-22

## 2020-12-22 ENCOUNTER — TELEPHONE (OUTPATIENT)
Dept: PALLIATIVE MEDICINE | Facility: CLINIC | Age: 55
End: 2020-12-22

## 2020-12-22 RX ORDER — OXYCODONE HCL 10 MG/1
10 TABLET, FILM COATED, EXTENDED RELEASE ORAL EVERY 12 HOURS
Qty: 60 TABLET | Refills: 0 | Status: SHIPPED | OUTPATIENT
Start: 2020-12-22 | End: 2021-01-25

## 2020-12-22 NOTE — PROGRESS NOTES
"Patient Name: Jailene Breen      YOB: 1965     Medical Record Number: [id number]  Diagnosis:    Lumbar facet joint pain  Lumbosacral spondylosis without myelopathy  Chronic bilateral low back pain without sciatica      The patient has been notified of the following:  \"This virtual visit will be conducted between you and your provider.  We have found that certain health care needs can be provided without the need for physical presence.  This service lets us provide the care you need with a virtual visit.\"     Due to external, as well as internal [facility] management of the COVID 19 Virus, Jailene Breen, was not seen in our clinic.  As a substitution, we implemented a virtual visit to manage this patient's condition utilizing the [employer] virtual visit platform.  The provider, Asif Saldana PT, reviewed the patient's chart and spoke with the patient to determine the following telemedicine visit is appropriate and effective for the patient's care.     The following type of visit was completed:  Video Visit:  The [employer] platform uses a synchronous HIPAA compliant video stream for this encounter.  Video start time:  4:05pm  Video end time:  4:42pm  Provider location:  [place] [facility]  Patient location: Home  Patient has given verbal consent for video visit? Yes          Visit Info Length of Visit: 37  Arrived:  On time   Exercise/Activity Education Instruction:  Postural Training/Anatomy  Pacing/Energy Conservation  Home Program  Self Care  Activity:  Therapeutic Exercise 18': Aerobic Conditioning (*see \"Strength/Functional Actitivities Record for details of exercises performed)  Continued attempts to educate on components of aerobic ex #4 - seated walk simulation, standing march as aerobic options  Stretching:  Upper Ext  bilateral, Lower Ext  Bilateral  Added stretch for seated HS  Postural Training:  static, dynamic  Slump, overcorrect then find his neutral spine " range     Neuro re-ed 10':  Verbal body scan for awareness alma overuse/overactivation of power muscles - 'guarding'  Worked on 'finding' neutral spine in prep for movment  Diaphragm breathing cues throughout  Continued work on finding his most stable, home base position- tripod feet, soft knees, neutral spine, thumbs fwd..  Emphasis on 'breathe, posture, move' sequencing    Therapeutic Activities 8':  Ed on body mechanics with focus on sit to/from stand #2 with cues for placement of hands, feet, trunk etc  Also ed on log roll, supine to/from sit with cues for positioning in starting and movement cues for leverage   Notes:  Slowly progressing toward goals.   Demonstrates/Verbalizes Technique:  3 (1= poor technique-difficulty performing exercises,significant cues required; 5= good technique-performs exercises without cues)  Body Awareness:   3 (1=low awareness; 5=high awareness)  Posture/Stability: 2, 3 (1= poor posture, stability; 5= good posture, stability)   Motivational Level:   Ask questions, Eager to learn, Cooperative and Distracted, in pain Participation:  Full   Patient Satisfaction:  satisfied    Response to Teaching:   cooperative and needs reinforcement  Factors that affect learning: None   Plan of Care  Recommend to continue PT to support reactivation and integration of self regulation pain management skills.   Therapist: Asif Saldana, PT                 Date: 12/16/2020

## 2020-12-22 NOTE — TELEPHONE ENCOUNTER
Per patient myChart message:  From: Jamison Breen      Created: 12/22/2020 1:03 PM      Hi, I have one more physical therapy left but it's not until the 5th of Jan. Is there any way you can pull any strings so I can get this out of the way so I can get the procedure done. My low back is flared up extremely bad and my current medication is not doing anything to help, I'm almost in tears with just a small move. I need to get this RFE done.      ________________________    Mychart sent to pt:  From: Cha Molina RN      Created: 12/22/2020 1:20 PM        Hi Jamison,  At this time, Asif Saldana, PT has appointment openings tomorrow.  You can always call our schedulers in the morning to see if there are any cancellations. [phone].     At this time Dr. Gamble's next available appointment for an radiofrequency ablation is on 1/11/2021.        Will keep encounter open for a couple of days in anticipation of patient call back.    Cha RN-BSN  [place] [facility]

## 2020-12-22 NOTE — TELEPHONE ENCOUNTER
Informed patient that their refill of oxyCODONE (OXYCONTIN) 10 MG 12 hr tablet was E-scribed to the pharmacy. Patient was informed of fill and start date.    Sherron Jennings [facility]  Page

## 2020-12-22 NOTE — TELEPHONE ENCOUNTER
Signed Prescriptions:                        Disp   Refills    oxyCODONE (OXYCONTIN) 10 MG 12 hr tablet   60 tab*0        Sig: Take 1 tablet (10 mg) by mouth every 12 hours Fill           1/1/2021. Begin 1/3/2021  Authorizing Provider: SUSANA PETTY    Reviewed [place]  December 22, 2020- no concerning fills.    Susana GRANT, RN CNP, FNP  [facility]

## 2020-12-23 ENCOUNTER — MYC MEDICAL ADVICE (OUTPATIENT)
Dept: PALLIATIVE MEDICINE | Facility: CLINIC | Age: 55
End: 2020-12-23

## 2020-12-23 ENCOUNTER — VIRTUAL VISIT (OUTPATIENT)
Dept: PALLIATIVE MEDICINE | Facility: CLINIC | Age: 55
End: 2020-12-23
Payer: COMMERCIAL

## 2020-12-23 ENCOUNTER — VIRTUAL VISIT (OUTPATIENT)
Dept: SURGERY | Facility: CLINIC | Age: 55
End: 2020-12-23
Payer: COMMERCIAL

## 2020-12-23 DIAGNOSIS — M54.50 CHRONIC BILATERAL LOW BACK PAIN WITHOUT SCIATICA: ICD-10-CM

## 2020-12-23 DIAGNOSIS — K44.9 HIATAL HERNIA: Primary | ICD-10-CM

## 2020-12-23 DIAGNOSIS — M47.817 LUMBOSACRAL SPONDYLOSIS WITHOUT MYELOPATHY: ICD-10-CM

## 2020-12-23 DIAGNOSIS — G89.29 CHRONIC BILATERAL LOW BACK PAIN WITHOUT SCIATICA: ICD-10-CM

## 2020-12-23 DIAGNOSIS — M54.59 LUMBAR FACET JOINT PAIN: Primary | ICD-10-CM

## 2020-12-23 PROCEDURE — 99203 OFFICE O/P NEW LOW 30 MIN: CPT | Mod: 95 | Performed by: SURGERY

## 2020-12-23 PROCEDURE — 97112 NEUROMUSCULAR REEDUCATION: CPT | Mod: GP | Performed by: PHYSICAL THERAPIST

## 2020-12-23 PROCEDURE — 97110 THERAPEUTIC EXERCISES: CPT | Mod: GP | Performed by: PHYSICAL THERAPIST

## 2020-12-23 NOTE — TELEPHONE ENCOUNTER
Tariq message from patient on 12/23 at 0902    Well I just finished the 4th pt. Please ask Dr Victor if there is anyway to squeeze me in sooner,  im in to much pain.   ------------------    Will route to care team for further review re: timing of RFA.     KERLINE ParkerN, RN-BC  Patient Care Supervisor  [facility] [place]

## 2020-12-23 NOTE — TELEPHONE ENCOUNTER
Tariq message from patient on 12/23 at 0902:    Well I just finished the 4th pt. Please ask Dr Victor if there is anyway to squeeze me in sooner,  im in to much pain.  -----------  Information added to call dated 11/11/20 re: completion of bilateral lumbar RFA.     Will route to care team for review.    KERLINE ParkerN, RN-BC  Patient Care Supervisor  [facility] [place]

## 2020-12-23 NOTE — PROGRESS NOTES
"Jailene Breen is a 55 year old male who is being evaluated via a billable telephone visit.      The patient has been notified of following:     \"This telephone visit will be conducted via a call between you and your physician/provider. We have found that certain health care needs can be provided without the need for a physical exam.  This service lets us provide the care you need with a short phone conversation.  If a prescription is necessary we can send it directly to your pharmacy.  If lab work is needed we can place an order for that and you can then stop by our lab to have the test done at a later time.    Telephone visits are billed at different rates depending on your insurance coverage. During this emergency period, for some insurers they may be billed the same as an in-person visit.  Please reach out to your insurance provider with any questions.    If during the course of the call the physician/provider feels a telephone visit is not appropriate, you will not be charged for this service.\"    Patient has given verbal consent for Telephone visit?  Yes    What phone number would you like to be contacted at? [phone]    How would you like to obtain your AVS? Shahram     Patient seen in consultation for hiatal hernia. Got a referral from ER visit in [facility] system but wanted to stay with [facility]  PCP JASON Spears    HPI:  Patient is a 55 year old male  with complaints of some shortness of breath, notices that it is worse after eating  Was worked up with pulmonology in past, no cause found  Seems to be getting worse  When feels like he can't take that deep breath can cause some anxiety  The patient noticed the symptoms about 4 years ago.   Daily symptoms  Ended up going to the ER with this, was told might be related to his hiatal hernia  Knew about hiatal hernia from perhaps an esophagram long ago, maybe 10 years ago or so. Has had other scans and thinks may have been seen on that as well.  Does get " occasional heartburn. Mostly watches what and when he eats which controls things for the most part. Tums as needed. Maybe a few times a week.  No previous endoscopy  Has an albuterol inhaler which does not do anything  Patient has not family history of similar problems    Review Of Systems    Skin: negative  Ears/Nose/Throat: negative  Respiratory: Shortness of breath- as above  Cardiovascular: negative  Gastrointestinal: diarrhea  Genitourinary: some retention issues  Musculoskeletal: back pain- ankylosing spondylitis, RA  Neurologic: negative  Hematologic/Lymphatic/Immunologic: negative  Endocrine: negative      Past Medical History:   Diagnosis Date     Ankylosing spondylitis (H) 3/1/2017     Anxiety      Arthritis     back, knees     Back pain     possible drug seeking behavior in the past.      Panic attacks        Past Surgical History:   Procedure Laterality Date     ARTHROSCOPY KNEE Right 9/22/2016    Procedure: ARTHROSCOPY KNEE;  Surgeon: Fredo Hughes MD;  Location: [facility] OR     INJECT PARAVERTEBRAL FACET JOINT LUMBAR / SACRAL FIRST Right 11/25/2016    Procedure: INJECT PARAVERTEBRAL FACET JOINT LUMBAR / SACRAL FIRST;  Surgeon: Doretha Magallanes MD;  Location: [facility]     ORTHOPEDIC SURGERY      Rt knee X 2     ORTHOPEDIC SURGERY      Lt foot       Social History     Socioeconomic History     Marital status:      Spouse name: Not on file     Number of children: Not on file     Years of education: Not on file     Highest education level: Not on file   Occupational History     Not on file   Social Needs     Financial resource strain: Not on file     Food insecurity     Worry: Not on file     Inability: Not on file     Transportation needs     Medical: Not on file     Non-medical: Not on file   Tobacco Use     Smoking status: Never Smoker     Smokeless tobacco: Former User     Types: Chew     Tobacco comment: Chew   Substance and Sexual Activity     Alcohol use: No     Comment: none     Drug  use: No     Sexual activity: Yes     Partners: Female     Comment: decreased with Prozac   Lifestyle     Physical activity     Days per week: Not on file     Minutes per session: Not on file     Stress: Not on file   Relationships     Social connections     Talks on phone: Not on file     Gets together: Not on file     Attends Nondenominational service: Not on file     Active member of club or organization: Not on file     Attends meetings of clubs or organizations: Not on file     Relationship status: Not on file     Intimate partner violence     Fear of current or ex partner: Not on file     Emotionally abused: Not on file     Physically abused: Not on file     Forced sexual activity: Not on file   Other Topics Concern     Parent/sibling w/ CABG, MI or angioplasty before 65F 55M? Not Asked   Social History Narrative     Not on file       Current Outpatient Medications   Medication Sig Dispense Refill     albuterol (PROAIR HFA/PROVENTIL HFA/VENTOLIN HFA) 108 (90 Base) MCG/ACT inhaler Inhale 2 puffs into the lungs every 6 hours as needed for shortness of breath / dyspnea 1 Inhaler 0     atenolol (TENORMIN) 25 MG tablet Take 25 mg by mouth daily       cyclobenzaprine (FLEXERIL) 10 MG tablet Take 0.5-1 tablets (5-10 mg) by mouth 3 times daily as needed for muscle spasms Caution sedation 60 tablet 2     escitalopram (LEXAPRO) 10 MG tablet Take 2 tablets (20 mg) by mouth daily 60 tablet 2     etanercept (ENBREL SURECLICK) 50 MG/ML autoinjector Inject 50 mg Subcutaneous once a week 1 kit 2     folic acid (FOLVITE) 1 MG tablet Take 3 tablets (3 mg) by mouth daily 180 tablet 3     guaiFENesin-codeine (ROBITUSSIN AC) 100-10 MG/5ML solution Take 5-10 mLs by mouth every 4 hours as needed for cough 180 mL 0     hydrOXYzine (ATARAX) 25 MG tablet Take 1 tablet (25 mg) by mouth daily as needed (for panic and severe anxiety) 30 tablet 2     IBUPROFEN PO Take 800 mg by mouth every 6 hours as needed for moderate pain Reported on 5/19/2017        methotrexate 2.5 MG tablet Take 4 tablets (10 mg) by mouth every 7 days Labs every 8 - 12 weeks for refills. Due now. 48 tablet 0     naloxone (NARCAN) 4 MG/0.1ML nasal spray Spray 1 spray (4 mg) into one nostril alternating nostrils as needed for opioid reversal every 2-3 minutes until assistance arrives 0.2 mL 0     nicotine (NICORETTE) 2 MG gum Place 2 each (4 mg) inside cheek as needed for smoking cessation 240 each 12     nicotine polacrilex (NICORETTE) 4 MG gum Place 1 each (4 mg) inside cheek as needed for smoking cessation 240 each 6     oxyCODONE (OXYCONTIN) 10 MG 12 hr tablet Take 1 tablet (10 mg) by mouth every 12 hours Fill 1/1/2021. Begin 1/3/2021 60 tablet 0     oxyCODONE (ROXICODONE) 5 MG tablet Take 1 tablet (5 mg) by mouth every 6 hours as needed for severe pain Max of 3 tabs/day.  Okay to dispense 12/8/20 and start 12/10/2020 90 tablet 0     predniSONE (DELTASONE) 20 MG tablet Take 1 tablet (20 mg) by mouth daily 5 tablet 0     tamsulosin (FLOMAX) 0.4 MG capsule Take 1 capsule (0.4 mg) by mouth daily 7 capsule 0     benzonatate (TESSALON) 200 MG capsule Take 1 capsule (200 mg) by mouth 3 times daily as needed for cough (Patient not taking: Reported on 12/23/2020) 20 capsule 0       Medications and history reviewed    Physical exam:  Vitals: There were no vitals taken for this visit.  BMI= There is no height or weight on file to calculate BMI.      Assessment:     ICD-10-CM    1. Hiatal hernia  K44.9 GASTROENTEROLOGY ADULT REF PROCEDURE ONLY     Plan: Long history of known hiatal hernia, was reportedly small some years ago but has had increasing issues with shortness of breath without other cause known.  He would like to see if it is possible his hiatal hernia is the underlying cause and address if needed.  He has not had endoscopy for there is no recent imaging that I can see that would be able to comment on the hiatal hernia, so we should start with an upper endoscopy for him in order to get a  direct view.  It is certainly possible that a hiatal hernia could give shortness of breath whether from a large hiatal hernia providing a mass-effect on the lungs, or reflux or duration of acid causing reactive airway issue, though would not be a typical presentation her symptoms.  I will be able to discuss more with him after the endoscopy is completed.    Brandon Mack MD      Phone call duration: 21 minutes

## 2020-12-23 NOTE — TELEPHONE ENCOUNTER
[facility] message from patient on 12/22 at 1538:    Hi, just an fyi you have my fill samuel on new years day but pharmacies will be closed. Just wanted to point that out, take care.     oxyCODONE (OXYCONTIN) 10 MG 12 hr tablet 60 tablet 0 12/22/2020  No   Sig - Route: Take 1 tablet (10 mg) by mouth every 12 hours Fill 1/1/2021. Begin 1/3/2021 - Oral     ---------------  Message sent to pt:  Devante Swift,      Thanks for the information. In a review of the prescription and timing of when you will need to start the new supply, even picking the medication up on 1/2 will work since you are not due to start the next supply until 1/3/21.      Take care,      Chrissie Milan, BSN, RN-BC  Patient Care Supervisor  [facility]

## 2020-12-23 NOTE — LETTER
"    12/23/2020         RE: Jailene Breen  [address]   [place] [address]        Dear Colleague,    Thank you for referring your patient, Jailene Breen, to the [facility]. Please see a copy of my visit note below.    Jailene Breen is a 55 year old male who is being evaluated via a billable telephone visit.      The patient has been notified of following:     \"This telephone visit will be conducted via a call between you and your physician/provider. We have found that certain health care needs can be provided without the need for a physical exam.  This service lets us provide the care you need with a short phone conversation.  If a prescription is necessary we can send it directly to your pharmacy.  If lab work is needed we can place an order for that and you can then stop by our lab to have the test done at a later time.    Telephone visits are billed at different rates depending on your insurance coverage. During this emergency period, for some insurers they may be billed the same as an in-person visit.  Please reach out to your insurance provider with any questions.    If during the course of the call the physician/provider feels a telephone visit is not appropriate, you will not be charged for this service.\"    Patient has given verbal consent for Telephone visit?  Yes    What phone number would you like to be contacted at? [phone]    How would you like to obtain your AVS? Shahram     Patient seen in consultation for hiatal hernia. Got a referral from ER visit in [facility] but wanted to stay with [facility]  PCP JASON Spears    HPI:  Patient is a 55 year old male  with complaints of some shortness of breath, notices that it is worse after eating  Was worked up with pulmonology in past, no cause found  Seems to be getting worse  When feels like he can't take that deep breath can cause some anxiety  The patient noticed the symptoms about 4 years ago.   Daily " symptoms  Ended up going to the ER with this, was told might be related to his hiatal hernia  Knew about hiatal hernia from perhaps an esophagram long ago, maybe 10 years ago or so. Has had other scans and thinks may have been seen on that as well.  Does get occasional heartburn. Mostly watches what and when he eats which controls things for the most part. Tums as needed. Maybe a few times a week.  No previous endoscopy  Has an albuterol inhaler which does not do anything  Patient has not family history of similar problems    Review Of Systems    Skin: negative  Ears/Nose/Throat: negative  Respiratory: Shortness of breath- as above  Cardiovascular: negative  Gastrointestinal: diarrhea  Genitourinary: some retention issues  Musculoskeletal: back pain- ankylosing spondylitis, RA  Neurologic: negative  Hematologic/Lymphatic/Immunologic: negative  Endocrine: negative      Past Medical History:   Diagnosis Date     Ankylosing spondylitis (H) 3/1/2017     Anxiety      Arthritis     back, knees     Back pain     possible drug seeking behavior in the past.      Panic attacks        Past Surgical History:   Procedure Laterality Date     ARTHROSCOPY KNEE Right 9/22/2016    Procedure: ARTHROSCOPY KNEE;  Surgeon: Fredo Hughes MD;  Location: [facility] OR     INJECT PARAVERTEBRAL FACET JOINT LUMBAR / SACRAL FIRST Right 11/25/2016    Procedure: INJECT PARAVERTEBRAL FACET JOINT LUMBAR / SACRAL FIRST;  Surgeon: Doretha Magallanes MD;  Location: [facility] OR     ORTHOPEDIC SURGERY      Rt knee X 2     ORTHOPEDIC SURGERY      Lt foot       Social History     Socioeconomic History     Marital status:      Spouse name: Not on file     Number of children: Not on file     Years of education: Not on file     Highest education level: Not on file   Occupational History     Not on file   Social Needs     Financial resource strain: Not on file     Food insecurity     Worry: Not on file     Inability: Not on file     Transportation needs      Medical: Not on file     Non-medical: Not on file   Tobacco Use     Smoking status: Never Smoker     Smokeless tobacco: Former User     Types: Chew     Tobacco comment: Chew   Substance and Sexual Activity     Alcohol use: No     Comment: none     Drug use: No     Sexual activity: Yes     Partners: Female     Comment: decreased with Prozac   Lifestyle     Physical activity     Days per week: Not on file     Minutes per session: Not on file     Stress: Not on file   Relationships     Social connections     Talks on phone: Not on file     Gets together: Not on file     Attends Confucianist service: Not on file     Active member of club or organization: Not on file     Attends meetings of clubs or organizations: Not on file     Relationship status: Not on file     Intimate partner violence     Fear of current or ex partner: Not on file     Emotionally abused: Not on file     Physically abused: Not on file     Forced sexual activity: Not on file   Other Topics Concern     Parent/sibling w/ CABG, MI or angioplasty before 65F 55M? Not Asked   Social History Narrative     Not on file       Current Outpatient Medications   Medication Sig Dispense Refill     albuterol (PROAIR HFA/PROVENTIL HFA/VENTOLIN HFA) 108 (90 Base) MCG/ACT inhaler Inhale 2 puffs into the lungs every 6 hours as needed for shortness of breath / dyspnea 1 Inhaler 0     atenolol (TENORMIN) 25 MG tablet Take 25 mg by mouth daily       cyclobenzaprine (FLEXERIL) 10 MG tablet Take 0.5-1 tablets (5-10 mg) by mouth 3 times daily as needed for muscle spasms Caution sedation 60 tablet 2     escitalopram (LEXAPRO) 10 MG tablet Take 2 tablets (20 mg) by mouth daily 60 tablet 2     etanercept (ENBREL SURECLICK) 50 MG/ML autoinjector Inject 50 mg Subcutaneous once a week 1 kit 2     folic acid (FOLVITE) 1 MG tablet Take 3 tablets (3 mg) by mouth daily 180 tablet 3     guaiFENesin-codeine (ROBITUSSIN AC) 100-10 MG/5ML solution Take 5-10 mLs by mouth every 4 hours as  needed for cough 180 mL 0     hydrOXYzine (ATARAX) 25 MG tablet Take 1 tablet (25 mg) by mouth daily as needed (for panic and severe anxiety) 30 tablet 2     IBUPROFEN PO Take 800 mg by mouth every 6 hours as needed for moderate pain Reported on 5/19/2017       methotrexate 2.5 MG tablet Take 4 tablets (10 mg) by mouth every 7 days Labs every 8 - 12 weeks for refills. Due now. 48 tablet 0     naloxone (NARCAN) 4 MG/0.1ML nasal spray Spray 1 spray (4 mg) into one nostril alternating nostrils as needed for opioid reversal every 2-3 minutes until assistance arrives 0.2 mL 0     nicotine (NICORETTE) 2 MG gum Place 2 each (4 mg) inside cheek as needed for smoking cessation 240 each 12     nicotine polacrilex (NICORETTE) 4 MG gum Place 1 each (4 mg) inside cheek as needed for smoking cessation 240 each 6     oxyCODONE (OXYCONTIN) 10 MG 12 hr tablet Take 1 tablet (10 mg) by mouth every 12 hours Fill 1/1/2021. Begin 1/3/2021 60 tablet 0     oxyCODONE (ROXICODONE) 5 MG tablet Take 1 tablet (5 mg) by mouth every 6 hours as needed for severe pain Max of 3 tabs/day.  Okay to dispense 12/8/20 and start 12/10/2020 90 tablet 0     predniSONE (DELTASONE) 20 MG tablet Take 1 tablet (20 mg) by mouth daily 5 tablet 0     tamsulosin (FLOMAX) 0.4 MG capsule Take 1 capsule (0.4 mg) by mouth daily 7 capsule 0     benzonatate (TESSALON) 200 MG capsule Take 1 capsule (200 mg) by mouth 3 times daily as needed for cough (Patient not taking: Reported on 12/23/2020) 20 capsule 0       Medications and history reviewed    Physical exam:  Vitals: There were no vitals taken for this visit.  BMI= There is no height or weight on file to calculate BMI.      Assessment:     ICD-10-CM    1. Hiatal hernia  K44.9 GASTROENTEROLOGY ADULT REF PROCEDURE ONLY     Plan: Long history of known hiatal hernia, was reportedly small some years ago but has had increasing issues with shortness of breath without other cause known.  He would like to see if it is possible  his hiatal hernia is the underlying cause and address if needed.  He has not had endoscopy for there is no recent imaging that I can see that would be able to comment on the hiatal hernia, so we should start with an upper endoscopy for him in order to get a direct view.  It is certainly possible that a hiatal hernia could give shortness of breath whether from a large hiatal hernia providing a mass-effect on the lungs, or reflux or duration of acid causing reactive airway issue, though would not be a typical presentation her symptoms.  I will be able to discuss more with him after the endoscopy is completed.    Brandon Mack MD      Phone call duration: 21 minutes            Again, thank you for allowing me to participate in the care of your patient.        Sincerely,        Brandon Mack MD

## 2020-12-23 NOTE — TELEPHONE ENCOUNTER
[employer] is down, will try again later today to complete JASON MILLAN    [facility]

## 2020-12-23 NOTE — TELEPHONE ENCOUNTER
Tariq sent to pt:  From: Cha Molina RN      Created: 12/23/2020 11:10 AM      Hi Jamison,  The information has been sent on to Yasemin to check coverage now.   I know you are anxious to get in.  Once approved you will be called to schedule. This procedure takes more time than the usual injection, it is the holidays and it requires a COVID test so it is hard to add you in sooner or add you in w/ a cancellation. Once it is approved we can get you scheduled.           See the 12/11/20 telephone  encounter for more information.    Cha Molina RN-BSN  [facility]

## 2020-12-23 NOTE — PROGRESS NOTES
"Patient Name: Jailene Breen      YOB: 1965     Medical Record Number: [id number]  Diagnosis:    Lumbar facet joint pain  Lumbosacral spondylosis without myelopathy  Chronic bilateral low back pain without sciatica      The patient has been notified of the following:  \"This virtual visit will be conducted between you and your provider.  We have found that certain health care needs can be provided without the need for physical presence.  This service lets us provide the care you need with a virtual visit.\"     Due to external, as well as internal [facility] management of the COVID 19 Virus, Jailene Breen, was not seen in our clinic.  As a substitution, we implemented a virtual visit to manage this patient's condition utilizing the [employer] virtual visit platform.  The provider, Asif Saldana PT, reviewed the patient's chart and spoke with the patient to determine the following telemedicine visit is appropriate and effective for the patient's care.     The following type of visit was completed:  Video Visit:  The [employer] platform uses a synchronous HIPAA compliant video stream for this encounter.  Video start time:  8:31am  Video end time:  8:58am  Provider location:  [facility]  Patient location: Home  Patient has given verbal consent for video visit? Yes             Visit Info Length of Visit: 27  Arrived:  On time   Exercise/Activity Education Instruction:  Postural Training/Anatomy  Pacing/Energy Conservation  Home Program  Self Care  Activity:  Therapeutic Exercise 15': Aerobic Conditioning (*see \"Strength/Functional Actitivities Record for details of exercises performed)  Ed on components of aerobic ex as pain allows, further ed on blood flow, O2 benefits, stay hydrated, breathe efficiently - low intensity options include seated walk, standing march  Stretching:  Upper Ext  bilateral, Lower Ext  Bilateral  Stretch routine  Postural Training:  static, dynamic " ongoing  Added seated Suresh Chi UE #1-3 movements with focus on fluid, flowing movement and staying in mid range, avoiding joint end range   Verbal ed on lateral wt shift, then lateral stepping as tolerated. Pt declined to trial this AM due to pain levels but stated will trial latera today as he loosens up   Slump, overcorrect --> neutral spine      Neuro re-ed 10':  Review of concept of verbal body scan for awareness alma overuse/overactivation of power muscles - 'guarding'  Diaphragm breathing cues and benefits revisited - functional application  'Centering' concept in sitting with finding of sitting bones, then breathing focus, then movement - pt performed this well and able to drop shoulders once breathing initiated  Continued work on finding his most stable, home base position in standing - tripod feet, soft knees, neutral spine, thumbs fwd  Emphasis on 'breathe, posture, move' sequencing   Notes:   Slowly progressing toward goals.   Demonstrates/Verbalizes Technique:  3 (1= poor technique-difficulty performing exercises,significant cues required; 5= good technique-performs exercises without cues)  Body Awareness:   3 (1=low awareness; 5=high awareness)  Posture/Stability: 2, 3 (1= poor posture, stability; 5= good posture, stability)   Motivational Level:   Ask questions, Eager to learn, Cooperative and Distracted, in pain Participation:  Full   Patient Satisfaction:  satisfied    Response to Teaching:   cooperative and needs reinforcement  Factors that affect learning: None   Plan of Care   Recommend to continue chronic pain PT to support reactivation and integration of self regulation pain management skills.   Therapist: Asif Saldana, PT                 Date: 12/23/2020

## 2020-12-23 NOTE — TELEPHONE ENCOUNTER
Please check insurance coverage for bilateral lumbar radiofrequency ablation.  Pt is anxious to get scheduled.    [employer] sent to pt:  From: Cha Molina RN      Created: 12/23/2020 11:10 AM      Hi Jamison,  The information has been sent on to Yasemin to check coverage now.   I know you are anxious to get in.  Once approved you will be called to schedule. This procedure takes more time than the usual injection, it is the holidays and it requires a COVID test so it is hard to add you in sooner or add you in w/ a cancellation. Once it is approved we can get you scheduled.         Routed to Yasemin.    SANDRA Eubanks-BSN  [facility]

## 2020-12-24 NOTE — TELEPHONE ENCOUNTER
Per patient [employer] message:  From: Jamison Breen      Created: 12/23/2020 10:23 PM      Well my car got stuck in our driveway and walking back to the house I slipped hard on my butt. Now my back is hurting so bad I can't even tell you, not going to call an ambulance,  cant afford that but I'm hurting bad.      _________________________    Mychart sent to pt:  From: Cha Molina RN      Created: 12/24/2020 8:23 AM      Hi Jamison,  I am so sorry.  I hope you sought care last night!  If not, please have this evaluated right away by the ER if needed, urgent care, or contact your primary care provider. As you know this is not something that we can do.  We only do your chronic pain and this is acute.     Good luck and keep us in the loop.        Routed to provider.     SANDRA Eubanks-BSN  [facility]

## 2020-12-24 NOTE — TELEPHONE ENCOUNTER
[employer] is still down, information was faxed into them with screenshot as they request. Right fax confirmed        Yasemin MILLAN    [facility]

## 2020-12-28 ENCOUNTER — MYC MEDICAL ADVICE (OUTPATIENT)
Dept: PALLIATIVE MEDICINE | Facility: CLINIC | Age: 55
End: 2020-12-28

## 2020-12-28 NOTE — TELEPHONE ENCOUNTER
Called Clari.    A prior authorization isn't needed.  Okay for pt to  tomorrow. To start on 1/3/21.      Will keep encounter open for a couple of days in anticipation of patient call back.    SANDRA Eubanks-BSN  [facility]

## 2020-12-29 ENCOUNTER — TELEPHONE (OUTPATIENT)
Dept: SURGERY | Facility: CLINIC | Age: 55
End: 2020-12-29

## 2020-12-29 NOTE — TELEPHONE ENCOUNTER
Sending to Susana Pike NP, as this is her patient.  Requirement changed up to 80% relief in October, and he didn't meet that requirement.    We could do a steroid injection, or he could consider paying out of pocket.     I'm not sure if that is an option.- form not scanned in yet.    Ashlyn Gamble MD  [facility]

## 2020-12-29 NOTE — TELEPHONE ENCOUNTER
PA for RFA was denied. Patient only reported 60% pain relief at his MBBs and the requirement for insurance is 80%.     KERLINE RivasN, RN  Care Coordinator  [facility] [place]

## 2020-12-29 NOTE — TELEPHONE ENCOUNTER
Location: [facility]  Is this a cancellation or reschedule?  no  The name of the procedure: EGD  Provider scheduled with: Dr Mack  Date 1/19, Time 730 am

## 2020-12-29 NOTE — TELEPHONE ENCOUNTER
Information noted.     Susana GRANT, RN CNP, FNP  [facility]  [facility]

## 2020-12-29 NOTE — TELEPHONE ENCOUNTER
PA denied.  Reason given:      Patient does not meet PA criteria.  Patient notified:  Letter sent by insurance company and letter forwarded to [employer].        Reason-    Patient needs to have 2 MBB's with at least 80% pain relief on both      Routing to review, this changed as of 10/2020      Yasemin MILLAN    [facility]

## 2020-12-31 ENCOUNTER — MYC MEDICAL ADVICE (OUTPATIENT)
Dept: FAMILY MEDICINE | Facility: CLINIC | Age: 55
End: 2020-12-31

## 2021-01-01 DIAGNOSIS — Z11.59 ENCOUNTER FOR SCREENING FOR OTHER VIRAL DISEASES: Primary | ICD-10-CM

## 2021-01-03 ENCOUNTER — MYC MEDICAL ADVICE (OUTPATIENT)
Dept: PALLIATIVE MEDICINE | Facility: CLINIC | Age: 56
End: 2021-01-03

## 2021-01-03 DIAGNOSIS — M06.9 RHEUMATOID ARTHRITIS INVOLVING MULTIPLE JOINTS (H): ICD-10-CM

## 2021-01-03 DIAGNOSIS — G89.4 CHRONIC PAIN SYNDROME: ICD-10-CM

## 2021-01-04 RX ORDER — OXYCODONE HYDROCHLORIDE 5 MG/1
5 TABLET ORAL EVERY 6 HOURS PRN
Qty: 90 TABLET | Refills: 0 | Status: SHIPPED | OUTPATIENT
Start: 2021-01-04 | End: 2021-02-08

## 2021-01-04 NOTE — TELEPHONE ENCOUNTER
Patient requesting refill(s) of oxycodon 5mg     Last dispensed from pharmacy on 12/08/2020    Patient's last office/virtual visit by prescribing provider on 11/11/2020  Next office/virtual appointment scheduled for None     Last urine drug screen date 01/21/2020  Current opioid agreement on file (completed within the last year) Yes Date of opioid agreement: 01/21/2020    E-prescribe to [facility] pharmacy in [place]    Will route to nursing [place] for review and preparation of prescription(s).

## 2021-01-04 NOTE — TELEPHONE ENCOUNTER
Will forward refill request to MA and nurse pool to process     Josias Lopez, RN  Care Coordinator   [facility]

## 2021-01-04 NOTE — TELEPHONE ENCOUNTER
Medication refill information reviewed.     Last due:   Okay to dispense 12/8/20 and start 12/10/2020     Due date:  1/9/21    Weaning instructions:  N/A    Prescriptions prepped for review.     Cha Molina RN-BSN  [facility]

## 2021-01-05 ENCOUNTER — MYC MEDICAL ADVICE (OUTPATIENT)
Dept: PALLIATIVE MEDICINE | Facility: CLINIC | Age: 56
End: 2021-01-05

## 2021-01-05 NOTE — TELEPHONE ENCOUNTER
Informed patient that their refill of  oxyCODONE (ROXICODONE) 5 MG tablet  was E-scribed to the pharmacy. Patient was informed of fill and start date.    Sherron Jennings [facility]  [place]

## 2021-01-05 NOTE — TELEPHONE ENCOUNTER
Signed Prescriptions:                        Disp   Refills    oxyCODONE (ROXICODONE) 5 MG tablet         90 tab*0        Sig: Take 1 tablet (5 mg) by mouth every 6 hours as needed           for severe pain Max of 3 tabs/day.  Okay to           dispense 1/7/2021 and start 1/9/2021  Authorizing Provider: SUSANA PETTY    Reviewed [place]  January 4, 2021- no concerning fills.    Susana Petty APRN, RN CNP, FNP  [facility]  [facility]

## 2021-01-06 NOTE — TELEPHONE ENCOUNTER
Per patient myChart message:  From: Jamison JOAQUÍN Breen      Created: 1/5/2021 9:13 PM      Hi, I got a letter from my insurance company saying the radio frequency was denied for both sides, why, this doesn't make any sense.      ____________    Mychart sent to pt:  From: Cha Molnia RN      Created: 1/6/2021 9:18 AM      Hi JamisonAngeles  Looks like your insurance requirements have changed and in order for them to cover a radiofrequency ablation you needed to received 80% relief from the L-sided blocks that you had.    I have sent a message on to Yasemin to see if a repeat R-sided lumbar radiofrequency ablation would be covered.  Per Dr. Gamble you received 60% from that one.     You may do a steroid injection instead may be an option.  JUANITA Ramon CNP has to review.      Here is the response of the Left sided medical branch blocks per your pain diaries your insurance requires 80%):            Max % of pain relief from medial branch block #1:                    Max relief from block is:    At rest:                 40%  Left fact load:       60%  Right facet load:  na  Normal activity:    60%            Max relief from block #2 is:    At rest:                 63%  Left fact load:       60%  Right facet load:  na  Normal activity:    60%     SANDRA Eubanks-BSN  [facility]

## 2021-01-06 NOTE — TELEPHONE ENCOUNTER
Melecio would insurance cover a repeat right lumbar radiofrequency ablation?   He received 60% improvement- last done 4    Cha RN-BSN  [place] [facility] [place]

## 2021-01-06 NOTE — TELEPHONE ENCOUNTER
See the 11/11/20 telephone encounter for exact information on this.    Will keep this encounter open for correspondence.    SANDRA Eubanks-BSN  [facility]

## 2021-01-11 NOTE — TELEPHONE ENCOUNTER
PA and clinicals submitted via web portal for REPEAT RIGHT LRFA to [employer].              Yasemin MILLAN    [place] [facility]

## 2021-01-12 ENCOUNTER — MYC MEDICAL ADVICE (OUTPATIENT)
Dept: FAMILY MEDICINE | Facility: CLINIC | Age: 56
End: 2021-01-12

## 2021-01-12 NOTE — TELEPHONE ENCOUNTER
Information noted.     Susana GRANT, RN CNP, FNP  [facility]  [facility]

## 2021-01-12 NOTE — TELEPHONE ENCOUNTER
PA approved.  Effective date: 1/11/21-7/10/21  PA reference #: [id number]  Pt. notified:   Okay to schedule REPEAT RIGHT LRFA with Dr Tye MILLAN    [place]

## 2021-01-13 ENCOUNTER — VIRTUAL VISIT (OUTPATIENT)
Dept: FAMILY MEDICINE | Facility: CLINIC | Age: 56
End: 2021-01-13
Payer: COMMERCIAL

## 2021-01-13 DIAGNOSIS — M06.9 RHEUMATOID ARTHRITIS INVOLVING MULTIPLE SITES, UNSPECIFIED WHETHER RHEUMATOID FACTOR PRESENT (H): ICD-10-CM

## 2021-01-13 DIAGNOSIS — F17.200 TOBACCO USE DISORDER: ICD-10-CM

## 2021-01-13 DIAGNOSIS — R05.9 COUGH: ICD-10-CM

## 2021-01-13 DIAGNOSIS — D84.9 IMMUNOCOMPROMISED (H): ICD-10-CM

## 2021-01-13 DIAGNOSIS — M45.7 ANKYLOSING SPONDYLITIS OF LUMBOSACRAL REGION (H): ICD-10-CM

## 2021-01-13 DIAGNOSIS — Z20.822 SUSPECTED COVID-19 VIRUS INFECTION: Primary | ICD-10-CM

## 2021-01-13 PROCEDURE — 99214 OFFICE O/P EST MOD 30 MIN: CPT | Mod: 95 | Performed by: PHYSICIAN ASSISTANT

## 2021-01-13 RX ORDER — BENZONATATE 200 MG/1
200 CAPSULE ORAL 3 TIMES DAILY PRN
Qty: 20 CAPSULE | Refills: 0 | Status: SHIPPED | OUTPATIENT
Start: 2021-01-13 | End: 2021-03-16

## 2021-01-13 RX ORDER — CODEINE PHOSPHATE AND GUAIFENESIN 10; 100 MG/5ML; MG/5ML
1-2 SOLUTION ORAL EVERY 4 HOURS PRN
Qty: 180 ML | Refills: 0 | Status: SHIPPED | OUTPATIENT
Start: 2021-01-13 | End: 2021-02-02

## 2021-01-13 NOTE — PROGRESS NOTES
Patient is being evaluated via a billable video visit.      How would you like to obtain your AVS? [employer]       Video Start Time: 1:32 PM    Assessment & Plan    Appears well.  Likely viral.    Chronic conditions stable unless mentioned otherwise in assessment and plan.      Problem List Items Addressed This Visit     Rheumatoid arthritis involving multiple sites, unspecified rheumatoid factor presence    Ankylosing spondylitis (H)    Tobacco use disorder    Immunocompromised (H)      Other Visit Diagnoses     Suspected COVID-19 virus infection    -  Primary    Relevant Orders    Symptomatic COVID-19 Virus (Coronavirus) by PCR    Cough        Relevant Medications    benzonatate (TESSALON) 200 MG capsule    guaiFENesin-codeine (ROBITUSSIN AC) 100-10 MG/5ML solution             Discussion of management or test interpretation with external physician/other qualified healthcare professional/appropriate source - I spoke with  pharmacy staff about covid testing- they are only testing asymptomatic people    Diagnosis or treatment significantly limited by social determinants of health - JEFF, MDD               Return in about 1 day (around 1/14/2021) for covid testing.    JASON Rice  [facility]    Subjective     Presents presents to clinic today for the following health issues     HPI   Patient hx RA, ank sponydlitis, tobacco abuse  on enbrel and methotrexate c/o cough x 1 week, seemed to improve for a bout 2 days then recurred.  Has covid test scheduled this Friday .  Maybe has some wheezing, + rhinitis since last night, no ST         Review of Systems   RESP cough as above      Objective           Vitals:  No vitals were obtained today due to virtual visit.    Physical Exam   GENERAL: Healthy, alert and no distress  EYES: Eyes grossly normal to inspection.  No discharge or erythema, or obvious scleral/conjunctival abnormalities.  RESP: No audible wheeze, cough, or  visible cyanosis.  No visible retractions or increased work of breathing.    SKIN: Visible skin clear. No significant rash, abnormal pigmentation or lesions.  NEURO: Cranial nerves grossly intact.  Mentation and speech appropriate for age.  PSYCH: Mentation appears normal, affect normal/bright, judgement and insight intact, normal speech and appearance well-groomed.            Video-Visit Details    Type of service:  Video Visit    Video End Time:143    Originating Location (pt. Location): Home    Distant Location (provider location):  [facility]     Platform used for Video Visit: [employer]

## 2021-01-13 NOTE — PATIENT INSTRUCTIONS
At [facility], we strive to deliver an exceptional experience to you, every time we see you. If you receive a survey, please complete it as we do value your feedback.  If you have MyChart, you can expect to receive results automatically within 24 hours of their completion.  Your provider will send a note interpreting your results as well.   If you do not have MyChart, you should receive your results in about a week by mail.    Your care team:                            Family Medicine Internal Medicine   MD Paul Coy MD Shantel Branch-Fleming, MD Katya Georgiev PA-C Megan Hill, APRN CNP    Meño Swan, MD Pediatrics   Ruperto Resendez, PALEYDI Jimenez, MD Dottie Pal APRN CNP   MD Paula Negrete MD Deborah Mielke, MD Kim Thein, APRN [facility]      Clinic hours: Monday - Thursday 7 am-7 pm; Fridays 7 am-5 pm.   Urgent care: Monday - Friday 11 am-9 pm; Saturday and Sunday 9 am-5 pm.    Clinic: [phone]       [facility]: Monday - Thursday 8 am - 7 pm; Friday 8 am - 6 pm  [facility]: [phone]     Use www.[email or web address].org for 24/7 diagnosis and treatment of dozens of conditions.  Patient Education     Coronavirus Disease 2019 (COVID-19): Caring for Yourself or Others  If you or a household member have symptoms of COVID-19, follow the guidelines below. This will help you manage symptoms and keep the virus from spreading.  If you have symptoms of COVID-19    Stay home and contact your care team. They will tell you what to do.    Don t panic. Keep in mind that other illnesses can cause similar symptoms.    Stay away from work, school, and public places.    Limit physical contact with others in your home. Limit visitors. No kissing.    Clean surfaces you touch with disinfectant.    If you need to cough or sneeze, do it into a tissue. Then, throw the tissue into the trash. If you don't have  tissues, cough or sneeze into the bend of your elbow    Don t share food or personal items with people in your household. This includes items like eating and drinking utensils, towels, and bedding.    Wear a cloth face mask around other people. It should cover your nose and mouth. You may need to make your own mask using a bandana, T-shirt, or other cloth. See the CDC s instructions on how to make a mask.    If you need to go to a hospital or clinic, call ahead to let them know. Expect the care team to wear masks, gowns, gloves, and eye protection. You may be put in a separate room.    Follow all instructions from your care team.  If you ve been diagnosed with COVID-19    Stay home and away from others, including other people in your home. (This is called self-isolation.) Don t leave home unless you need to get medical care. Don't go to work, school, or public places. Don't use buses, taxis, or other public transportation.    Follow all instructions from your care team.    If you need to go to a hospital or clinic, call ahead to let them know. Expect the care team to wear masks, gowns, gloves, and eye protection. You may be put in a separate room.    Wear a face mask over your nose and mouth. This is to protect others from your germs. If you can t wear a mask, your caregivers should wear one. You may need to make your own mask using a bandana, T-shirt, or other cloth. See the CDC s instructions on how to make a mask.    Have no contact with pets and other animals.    Don t share food or personal items with people in your household. This includes items like eating and drinking utensils, towels, and bedding.    If you need to cough or sneeze, do it into a tissue. Then, throw the tissue into the trash. If you don't have tissues, cough or sneeze into the bend of your elbow.    Wash your hands often.  Self-care at home  At this time, there is no medicine approved to prevent or treat COVID-19. Experts are testing  different medicines, trying to find one that works.  So far, the most proven treatment is to support your body while it fights the virus.    Get plenty of rest.    Drink extra fluids (6 to 8 glasses of liquids each day), unless a doctor has told you not to. Ask your care team which fluids are best for you. Avoid fluids that contain caffeine or alcohol.    Take over-the-counter (OTC) medicine to help reduce pain and fever. Your care team will tell you which OTC medicine to use.  If you ve been in the hospital for COVID-19, follow your care team s instructions. This may include changing positions to help your breathing (such as lying on your belly).  If a test showed that you have COVID-19, you may be asked to donate plasma after you ve recovered. (This is called COVID-19 convalescent plasma donation.) The plasma may contain antibodies to help fight the virus in others. Scientists are testing whether this might be a treatment in the future. For more information, talk to your care team.  Caring for a sick person    Follow all instructions from the care team.    Wash your hands often.    Wear protective clothing as advised.    Make sure the sick person wears a mask. If they can't wear a mask, don't stay in the same room with them. If you must be in the same room, wear a face mask. Make sure the mask covers both the nose and mouth.    Keep track of the sick person s symptoms.    Clean surfaces often with disinfectant. This includes phones, kitchen counters, fridge door handles, bathroom surfaces, and others.    Don t let anyone share household items with the sick person. This includes eating and drinking tools, towels, sheets, and blankets.    Clean fabrics and laundry well.    Keep other people and pets away from the sick person.  When you can stop self-isolation  When you are sick with COVID-19, you should stay away from other people. This is called self-isolation. The rules for ending self-isolation depend on your  health in general.  If you are normally healthy  You can stop self-isolation when all 3 of these are true:    You ve had no fever--and no medicine that reduces fever--for 3 full days (72 hours). And     Your symptoms are better, such as cough or trouble breathing. And     At least 10 days have passed since your symptoms first started.  Talk with your care team before you leave home. They may tell you it s okay to leave, or they may give you different advice.  If you have a weak immune system  If you re being treated for cancer, have an immune disorder (such as HIV), or have had a transplant (organ or bone marrow), follow your care team s instructions. You may be able to end self-isolation when all 3 of these are true:    You ve had no fever--and no medicine that reduces fever--for 3 full days (72 hours). And     Your symptoms are better, such as cough or trouble breathing. And     You ve had 2 tests for COVID-19. The tests happened at least 24 hours apart, and both show that you don t have the virus. (If no tests are available, your care team may tell you to follow the rules for normally healthy people above.)  When to call your care team  Call your care team right away if a sick person has any of these:    Trouble breathing    Pain or pressure in the chest  If a sick person has any of these, call 911:    Trouble breathing that gets worse    Pain or pressure in the chest that gets worse    Blue tint to lips or face    Fast or irregular heartbeat    Confusion or trouble waking    Fainting or loss of consciousness    Coughing up blood  Going home from the hospital  If you have COVID-19 and were recently in the hospital:    Follow the instructions above for self-care and isolation.    Follow the hospital care team s instructions.    Ask questions if anything is unclear to you. Write down answers so you remember them.  Last modified date: 5/8/2020  This information has been modified by your health care provider with  permission from the publisher.      2447-9709 [facility], [address] [place] [address], [place] [address]. All rights reserved. This information is not intended as a substitute for professional medical care. Always follow your healthcare professional's instructions. This information has been modified by your health care provider with permission from the publisher.

## 2021-01-13 NOTE — TELEPHONE ENCOUNTER
: Okay to schedule REPEAT RIGHT LRFA with Dr Tye Adam, KERLINEN, RN  Care Coordinator  [facility] [place]

## 2021-01-14 ENCOUNTER — HEALTH MAINTENANCE LETTER (OUTPATIENT)
Age: 56
End: 2021-01-14

## 2021-01-14 DIAGNOSIS — Z20.822 SUSPECTED COVID-19 VIRUS INFECTION: ICD-10-CM

## 2021-01-14 PROCEDURE — U0003 INFECTIOUS AGENT DETECTION BY NUCLEIC ACID (DNA OR RNA); SEVERE ACUTE RESPIRATORY SYNDROME CORONAVIRUS 2 (SARS-COV-2) (CORONAVIRUS DISEASE [COVID-19]), AMPLIFIED PROBE TECHNIQUE, MAKING USE OF HIGH THROUGHPUT TECHNOLOGIES AS DESCRIBED BY CMS-2020-01-R: HCPCS | Performed by: PHYSICIAN ASSISTANT

## 2021-01-14 PROCEDURE — U0005 INFEC AGEN DETEC AMPLI PROBE: HCPCS | Performed by: PHYSICIAN ASSISTANT

## 2021-01-14 NOTE — TELEPHONE ENCOUNTER
Screening Questions for RFA Procedure      Procedure ordered? Susana Pike     What insurance are we billing for this procedure?  [employer]    Has patient had this injection before? Yes:   This procedure requires that a COVID-19 lab test be done within 4 days of the procedure.   If patient asks why? We will not always be able to maintain a 6' distance, the procedure takes a long time, there will be more people in a smaller area, and use of sedation medication increases the risk of respiratory treatments.    Would you still like to move forward with scheduling the procedure?  Yes  If YES, let patient know that someone will call them to schedule the COVID-19 test nd that they will only receive a call back if the result is positive. Route to nursing to enter order.   Any chance of pregnancy? Not Applicable   If YES, do NOT schedule and route to RN pool     Is  Needed?: No  Will patient have a ?  Yes   If pt is given sedation meds, no driving for 24 hours.  Is pt taking a cab or transportation service? Not Applicable        If so will need to be accompanied by an adult too (friend/family member) in order for IV sedation to be given.      Per [place] Policy:  Outpatients are to have responsible adult or family member to accompany them at discharge and drive them home. A service providing medically trained drivers or attendants would be acceptable. Public transportation would not be acceptable unless the patient is accompanied by a responsible adult or family member.  Is patient taking any blood thinners (i.e. plavix, coumadin, jantoven, warfarin, heparin, pradaxa or dabigatran, etc)? No   If YES, do NOT schedule, and route to RN pool    Is patient taking any aspirin products? No     If more than 325mg/day, OK to schedule; Instruct pt to decrease to less than 325 mg for 7 days AND route to RN pool    For CERVICAL procedures, hold all aspirin products for 6 days.     Tell pt that if aspirin product is not  held for 6 days, the procedure WILL BE cancelled.      Does the patient have a bleeding or clotting disorder? No     If YES, it it OKAY to schedule AND route to RN pool    **For any patients with platelet count <100, must be forwarded to provider**    Is patient diabetic? No If YES, have them bring their glucometer.    Does patient have an active infection or treated for one within the past week? No   If YES, do NOT schedule and route to RN nurse pool     Is patient currently taking any antibiotics?  No    For patients on chronic, preventative, or prophylactic antibiotics, procedures may be scheduled.     For patients on antibiotics for active or recent infection:antibiotic course must have been completed for 4 days    Is patient currently taking any steroid medications? (i.e. Prednisone, Medrol)  No     For patients on steroid medications, course must have been completed for 4 days    Is patient actively being treated for cancer or immunocompromised, including the spleen having been removed? No  If YES, do NOT schedule and route to RN pool     Any history of complications with sedation medications?  NO   If YES, OK to schedule AND route to RN pool     Any history of sleep apnea?  NO   If YES, OK to schedule AND route to RN pool     Any cardiac history?  NO   If YES, OK to schedule AND route to RN pool     Do you have an implanted pacemaker, ICD (implanted cardiac device) or AICD (automatic implanted cardiac device)?  NO    If YES, do NOT schedule AND route to RN pool.     Obtain name of device :       Obtain name of cardiologist:       Do you have an implanted stimulator?  NO    If YES, OK to schedule AND route to nursing.     Instruct patient to bring in the remote to the appointment and it will need to be turned off.  reviewed      Does patient have an allergy to contrast dye, iodine or shellfish?  No   If YES, OK to schedule. Route to RN pool AND add allergy information to appointment notes    Are  you able to get on and off an exam table with minimal or no assistance? Yes   If NO, do NOT schedule and route to RN pool    Are you able to roll over and lay on your stomach with minimal or no assistance? Yes   If NO, do NOT schedule and route to RN pool    Reminders:    If you are started on any steroids or antibiotics between now and your appointment, you must contact us because it may affect our ability to perform your procedure.  Yes    Informed patient that s/he needs to fast for 6 hours before procedure?  YES    Informed patient that it is OK to take normal medications with sips of water, especially blood pressure medications, before the procedure and must hold blood thinners as instructed.  Yes    Informed patient to arrive 30 minutes before procedure time to have an IV inserted.  reviewed   Do NOT schedule at 0745, 0815 or 1245.  reviewed     All radiofrequency ablations are in a 90 minute time slot.  reviewed

## 2021-01-14 NOTE — TELEPHONE ENCOUNTER
RFA is 01/20/21. The COVID lab is ordered for 01/16/21 and an appt note is placed.     KERLINE RivasN, RN  Care Coordinator  [facility] [place]

## 2021-01-15 DIAGNOSIS — R05.9 COUGH: ICD-10-CM

## 2021-01-15 LAB
SARS-COV-2 RNA RESP QL NAA+PROBE: NOT DETECTED
SPECIMEN SOURCE: NORMAL

## 2021-01-16 ENCOUNTER — OFFICE VISIT (OUTPATIENT)
Dept: URGENT CARE | Facility: URGENT CARE | Age: 56
End: 2021-01-16
Payer: COMMERCIAL

## 2021-01-16 VITALS
TEMPERATURE: 97 F | DIASTOLIC BLOOD PRESSURE: 84 MMHG | OXYGEN SATURATION: 98 % | WEIGHT: 271.13 LBS | BODY MASS INDEX: 33 KG/M2 | SYSTOLIC BLOOD PRESSURE: 125 MMHG | RESPIRATION RATE: 20 BRPM | HEART RATE: 57 BPM

## 2021-01-16 DIAGNOSIS — Z20.822 ENCOUNTER FOR LABORATORY TESTING FOR COVID-19 VIRUS: ICD-10-CM

## 2021-01-16 DIAGNOSIS — J01.10 ACUTE NON-RECURRENT FRONTAL SINUSITIS: Primary | ICD-10-CM

## 2021-01-16 DIAGNOSIS — R05.9 COUGH: ICD-10-CM

## 2021-01-16 LAB
FLUAV+FLUBV AG SPEC QL: NEGATIVE
FLUAV+FLUBV AG SPEC QL: NEGATIVE
SARS-COV-2 RNA RESP QL NAA+PROBE: NORMAL
SPECIMEN SOURCE: NORMAL
SPECIMEN SOURCE: NORMAL

## 2021-01-16 PROCEDURE — U0005 INFEC AGEN DETEC AMPLI PROBE: HCPCS | Performed by: PSYCHIATRY & NEUROLOGY

## 2021-01-16 PROCEDURE — 87804 INFLUENZA ASSAY W/OPTIC: CPT | Performed by: FAMILY MEDICINE

## 2021-01-16 PROCEDURE — U0003 INFECTIOUS AGENT DETECTION BY NUCLEIC ACID (DNA OR RNA); SEVERE ACUTE RESPIRATORY SYNDROME CORONAVIRUS 2 (SARS-COV-2) (CORONAVIRUS DISEASE [COVID-19]), AMPLIFIED PROBE TECHNIQUE, MAKING USE OF HIGH THROUGHPUT TECHNOLOGIES AS DESCRIBED BY CMS-2020-01-R: HCPCS | Performed by: PSYCHIATRY & NEUROLOGY

## 2021-01-16 PROCEDURE — 99213 OFFICE O/P EST LOW 20 MIN: CPT | Performed by: FAMILY MEDICINE

## 2021-01-16 RX ORDER — CODEINE PHOSPHATE AND GUAIFENESIN 10; 100 MG/5ML; MG/5ML
1-2 SOLUTION ORAL EVERY 4 HOURS PRN
Qty: 180 ML | Refills: 0 | Status: SHIPPED | OUTPATIENT
Start: 2021-01-16 | End: 2021-02-02

## 2021-01-16 RX ORDER — AZITHROMYCIN 250 MG/1
TABLET, FILM COATED ORAL
Qty: 6 TABLET | Refills: 0 | Status: SHIPPED | OUTPATIENT
Start: 2021-01-16 | End: 2021-01-21

## 2021-01-16 NOTE — NURSING NOTE
The primary reason for today's visit is to get a refill of cough medication that he was recently prescribed. The patient indicated that he spilled it. He has a negative COVID test from 1-14-21.    Lupis Schneider, CMA

## 2021-01-16 NOTE — PROGRESS NOTES
Assessment & Plan     Acute non-recurrent frontal sinusitis  Advised with supportive care  Negative for Influenza tests  - azithromycin (ZITHROMAX) 250 MG tablet; Take 2 tablets (500 mg) by mouth daily for 1 day, THEN 1 tablet (250 mg) daily for 4 days.  - guaiFENesin-codeine (ROBITUSSIN AC) 100-10 MG/5ML solution; Take 5-10 mLs by mouth every 4 hours as needed for cough    Cough  Negative for Influenza test  - Influenza A/B antigen  - azithromycin (ZITHROMAX) 250 MG tablet; Take 2 tablets (500 mg) by mouth daily for 1 day, THEN 1 tablet (250 mg) daily for 4 days.  - guaiFENesin-codeine (ROBITUSSIN AC) 100-10 MG/5ML solution; Take 5-10 mLs by mouth every 4 hours as needed for cough    Review of the result(s) of each unique test - Influenza         There are no Patient Instructions on file for this visit.    No follow-ups on file.    Raffy Newell MD  [facility]    Nahun Swift is a 55 year old who presents to clinic today for the following health issues:  Been having cough, sinus congestion for over a week now.  No wheezing.  No fever.  However, been having chills.  Denies short of breath.  Negative Covid test 2 days ago  HPI     Review of Systems   Constitutional, HEENT, cardiovascular, pulmonary, gi and gu systems are negative, except as otherwise noted.      Objective    /84 (BP Location: Left arm, Patient Position: Sitting, Cuff Size: Adult Large)   Pulse 57   Temp 97  F (36.1  C) (Oral)   Resp 20   Wt 123 kg (271 lb 2 oz)   SpO2 98%   BMI 33.00 kg/m    Body mass index is 33 kg/m .  Physical Exam   GENERAL: healthy, alert and no distress  HENT: ear canals and TM's normal, nose and mouth without ulcers or lesions  NECK: no adenopathy, no asymmetry, masses, or scars and thyroid normal to palpation  RESP: lungs clear to auscultation - no rales, rhonchi or wheezes  CV: regular rate and rhythm, normal S1 S2, no S3 or S4, no murmur, click or rub, no peripheral  edema and peripheral pulses strong  MS: no gross musculoskeletal defects noted, no edema    Influenza test: Negative      Raffy Newell MD

## 2021-01-17 LAB
LABORATORY COMMENT REPORT: NORMAL
SARS-COV-2 RNA RESP QL NAA+PROBE: NEGATIVE
SPECIMEN SOURCE: NORMAL

## 2021-01-18 RX ORDER — CODEINE PHOSPHATE AND GUAIFENESIN 10; 100 MG/5ML; MG/5ML
LIQUID ORAL
Refills: 0 | OUTPATIENT
Start: 2021-01-18

## 2021-01-18 ASSESSMENT — MIFFLIN-ST. JEOR: SCORE: 2116.78

## 2021-01-19 ENCOUNTER — HOSPITAL ENCOUNTER (OUTPATIENT)
Facility: AMBULATORY SURGERY CENTER | Age: 56
Discharge: HOME | End: 2021-01-19
Attending: SURGERY | Admitting: SURGERY
Payer: COMMERCIAL

## 2021-01-19 VITALS
WEIGHT: 265 LBS | HEART RATE: 73 BPM | OXYGEN SATURATION: 96 % | HEIGHT: 74 IN | TEMPERATURE: 97.2 F | RESPIRATION RATE: 18 BRPM | DIASTOLIC BLOOD PRESSURE: 74 MMHG | SYSTOLIC BLOOD PRESSURE: 129 MMHG | BODY MASS INDEX: 34.01 KG/M2

## 2021-01-19 DIAGNOSIS — K20.80 ESOPHAGITIS, LOS ANGELES GRADE A: ICD-10-CM

## 2021-01-19 DIAGNOSIS — K29.70 GASTRITIS DETERMINED BY ENDOSCOPY: ICD-10-CM

## 2021-01-19 DIAGNOSIS — K44.9 HIATAL HERNIA WITH GERD AND ESOPHAGITIS: Primary | ICD-10-CM

## 2021-01-19 DIAGNOSIS — K21.00 HIATAL HERNIA WITH GERD AND ESOPHAGITIS: Primary | ICD-10-CM

## 2021-01-19 LAB — UPPER GI ENDOSCOPY: NORMAL

## 2021-01-19 PROCEDURE — 43239 EGD BIOPSY SINGLE/MULTIPLE: CPT

## 2021-01-19 PROCEDURE — 88305 TISSUE EXAM BY PATHOLOGIST: CPT | Performed by: PATHOLOGY

## 2021-01-19 PROCEDURE — G8907 PT DOC NO EVENTS ON DISCHARG: HCPCS

## 2021-01-19 PROCEDURE — G8918 PT W/O PREOP ORDER IV AB PRO: HCPCS

## 2021-01-19 PROCEDURE — 43239 EGD BIOPSY SINGLE/MULTIPLE: CPT | Performed by: SURGERY

## 2021-01-19 RX ORDER — FLUMAZENIL 0.1 MG/ML
0.2 INJECTION, SOLUTION INTRAVENOUS
Status: CANCELLED | OUTPATIENT
Start: 2021-01-19 | End: 2021-01-19

## 2021-01-19 RX ORDER — PROCHLORPERAZINE MALEATE 10 MG
10 TABLET ORAL EVERY 6 HOURS PRN
Status: CANCELLED | OUTPATIENT
Start: 2021-01-19

## 2021-01-19 RX ORDER — FENTANYL CITRATE 50 UG/ML
INJECTION, SOLUTION INTRAMUSCULAR; INTRAVENOUS PRN
Status: DISCONTINUED | OUTPATIENT
Start: 2021-01-19 | End: 2021-01-19 | Stop reason: HOSPADM

## 2021-01-19 RX ORDER — ONDANSETRON 2 MG/ML
4 INJECTION INTRAMUSCULAR; INTRAVENOUS
Status: DISCONTINUED | OUTPATIENT
Start: 2021-01-19 | End: 2021-01-20 | Stop reason: HOSPADM

## 2021-01-19 RX ORDER — NALOXONE HYDROCHLORIDE 0.4 MG/ML
0.4 INJECTION, SOLUTION INTRAMUSCULAR; INTRAVENOUS; SUBCUTANEOUS
Status: CANCELLED | OUTPATIENT
Start: 2021-01-19 | End: 2021-01-20

## 2021-01-19 RX ORDER — ONDANSETRON 2 MG/ML
4 INJECTION INTRAMUSCULAR; INTRAVENOUS EVERY 6 HOURS PRN
Status: CANCELLED | OUTPATIENT
Start: 2021-01-19

## 2021-01-19 RX ORDER — NALOXONE HYDROCHLORIDE 0.4 MG/ML
0.2 INJECTION, SOLUTION INTRAMUSCULAR; INTRAVENOUS; SUBCUTANEOUS
Status: CANCELLED | OUTPATIENT
Start: 2021-01-19 | End: 2021-01-20

## 2021-01-19 RX ORDER — ONDANSETRON 4 MG/1
4 TABLET, ORALLY DISINTEGRATING ORAL EVERY 6 HOURS PRN
Status: CANCELLED | OUTPATIENT
Start: 2021-01-19

## 2021-01-19 RX ORDER — LIDOCAINE 40 MG/G
CREAM TOPICAL
Status: DISCONTINUED | OUTPATIENT
Start: 2021-01-19 | End: 2021-01-20 | Stop reason: HOSPADM

## 2021-01-20 NOTE — TELEPHONE ENCOUNTER
COVID lab order signed. Appt note placed.     KERLINE RivasN, RN  Care Coordinator  [facility] [place]

## 2021-01-21 LAB — COPATH REPORT: NORMAL

## 2021-01-25 ENCOUNTER — MYC MEDICAL ADVICE (OUTPATIENT)
Dept: PALLIATIVE MEDICINE | Facility: CLINIC | Age: 56
End: 2021-01-25

## 2021-01-25 DIAGNOSIS — G89.4 CHRONIC PAIN SYNDROME: ICD-10-CM

## 2021-01-25 DIAGNOSIS — M25.561 CHRONIC PAIN OF BOTH KNEES: ICD-10-CM

## 2021-01-25 DIAGNOSIS — G89.29 CHRONIC PAIN OF BOTH KNEES: ICD-10-CM

## 2021-01-25 DIAGNOSIS — M45.7 ANKYLOSING SPONDYLITIS OF LUMBOSACRAL REGION (H): ICD-10-CM

## 2021-01-25 DIAGNOSIS — M79.18 MYOFASCIAL PAIN: ICD-10-CM

## 2021-01-25 DIAGNOSIS — M51.369 DDD (DEGENERATIVE DISC DISEASE), LUMBAR: ICD-10-CM

## 2021-01-25 DIAGNOSIS — M25.562 CHRONIC PAIN OF BOTH KNEES: ICD-10-CM

## 2021-01-25 DIAGNOSIS — M54.59 LUMBAR FACET JOINT PAIN: ICD-10-CM

## 2021-01-25 DIAGNOSIS — M47.816 SPONDYLOSIS OF LUMBAR REGION WITHOUT MYELOPATHY OR RADICULOPATHY: ICD-10-CM

## 2021-01-25 RX ORDER — OXYCODONE HCL 10 MG/1
10 TABLET, FILM COATED, EXTENDED RELEASE ORAL EVERY 12 HOURS
Qty: 60 TABLET | Refills: 0 | Status: SHIPPED | OUTPATIENT
Start: 2021-01-25 | End: 2021-02-16

## 2021-01-25 NOTE — TELEPHONE ENCOUNTER
Medication refill information reviewed.     Due date for Oxycontin 10mg is 2/2/21     Prescriptions prepped for review.     Will route to provider.     Josias Lopez RN  Care Coordinator   [facility] [place]

## 2021-01-25 NOTE — TELEPHONE ENCOUNTER
Signed Prescriptions:                        Disp   Refills    oxyCODONE (OXYCONTIN) 10 MG 12 hr tablet   60 tab*0        Sig: Take 1 tablet (10 mg) by mouth every 12 hours Fill           1/31/2021. Begin 2/2/2021  Authorizing Provider: SUSANA PETTY    Reviewed [place]  January 25, 2021- no concerning fills.    Susana GRANT, RN CNP, FNP  [facility]

## 2021-01-25 NOTE — TELEPHONE ENCOUNTER
Received call from patient requesting refill(s) of Oxycontin 10mg     Last dispensed from pharmacy on 12/28/2020    Patient's last office/virtual visit by prescribing provider on 12/23/2020  Next office/virtual appointment scheduled for 2/1/2021    Last urine drug screen date 1/21/2020  Current opioid agreement on file (completed within the last year) No Date of opioid agreement: 1/21/2020    E-prescribe to [id number] - [place] - [phone] pharmacy    Will route to [facility] for review and preparation of prescription(s).

## 2021-01-26 NOTE — TELEPHONE ENCOUNTER
Prescription for oxyCODONE (OXYCONTIN) 10 MG 12 hr tablet was sent to  [id number] [place]      Fill 1/31/2021. Begin 2/2/2021    Nicolasa Krueger MA

## 2021-01-28 ENCOUNTER — MYC MEDICAL ADVICE (OUTPATIENT)
Dept: PALLIATIVE MEDICINE | Facility: CLINIC | Age: 56
End: 2021-01-28

## 2021-01-28 DIAGNOSIS — M47.819 FACET ARTHROPATHY: ICD-10-CM

## 2021-01-28 DIAGNOSIS — Z20.822 ENCOUNTER FOR LABORATORY TESTING FOR COVID-19 VIRUS: ICD-10-CM

## 2021-01-28 DIAGNOSIS — M45.7 ANKYLOSING SPONDYLITIS OF LUMBOSACRAL REGION (H): Primary | ICD-10-CM

## 2021-01-28 LAB
LABORATORY COMMENT REPORT: NORMAL
SARS-COV-2 RNA RESP QL NAA+PROBE: NEGATIVE
SARS-COV-2 RNA RESP QL NAA+PROBE: NORMAL
SPECIMEN SOURCE: NORMAL
SPECIMEN SOURCE: NORMAL

## 2021-01-28 PROCEDURE — U0005 INFEC AGEN DETEC AMPLI PROBE: HCPCS | Performed by: PSYCHIATRY & NEUROLOGY

## 2021-01-28 PROCEDURE — U0003 INFECTIOUS AGENT DETECTION BY NUCLEIC ACID (DNA OR RNA); SEVERE ACUTE RESPIRATORY SYNDROME CORONAVIRUS 2 (SARS-COV-2) (CORONAVIRUS DISEASE [COVID-19]), AMPLIFIED PROBE TECHNIQUE, MAKING USE OF HIGH THROUGHPUT TECHNOLOGIES AS DESCRIBED BY CMS-2020-01-R: HCPCS | Performed by: PSYCHIATRY & NEUROLOGY

## 2021-01-29 DIAGNOSIS — F41.1 GAD (GENERALIZED ANXIETY DISORDER): ICD-10-CM

## 2021-01-29 NOTE — TELEPHONE ENCOUNTER
Routing refill request to provider for review/approval because:  Drug not on the [facility] refill protocol     Marisa CHURCHILLN, RN

## 2021-02-01 ENCOUNTER — RADIOLOGY INJECTION OFFICE VISIT (OUTPATIENT)
Dept: PALLIATIVE MEDICINE | Facility: CLINIC | Age: 56
End: 2021-02-01
Payer: COMMERCIAL

## 2021-02-01 VITALS
DIASTOLIC BLOOD PRESSURE: 84 MMHG | SYSTOLIC BLOOD PRESSURE: 135 MMHG | OXYGEN SATURATION: 96 % | HEART RATE: 53 BPM | RESPIRATION RATE: 16 BRPM

## 2021-02-01 DIAGNOSIS — M47.816 SPONDYLOSIS OF LUMBAR REGION WITHOUT MYELOPATHY OR RADICULOPATHY: ICD-10-CM

## 2021-02-01 DIAGNOSIS — G89.18 PAIN ASSOCIATED WITH SURGICAL PROCEDURE: ICD-10-CM

## 2021-02-01 DIAGNOSIS — M47.819 FACET ARTHROPATHY: Primary | ICD-10-CM

## 2021-02-01 PROCEDURE — 64635 DESTROY LUMB/SAC FACET JNT: CPT | Mod: 50 | Performed by: PSYCHIATRY & NEUROLOGY

## 2021-02-01 PROCEDURE — 99152 MOD SED SAME PHYS/QHP 5/>YRS: CPT | Performed by: PSYCHIATRY & NEUROLOGY

## 2021-02-01 PROCEDURE — 99153 MOD SED SAME PHYS/QHP EA: CPT | Performed by: PSYCHIATRY & NEUROLOGY

## 2021-02-01 RX ORDER — OXYCODONE AND ACETAMINOPHEN 5; 325 MG/1; MG/1
1 TABLET ORAL EVERY 4 HOURS PRN
Qty: 60 TABLET | Refills: 0 | Status: SHIPPED | OUTPATIENT
Start: 2021-02-01 | End: 2021-02-16

## 2021-02-01 RX ORDER — KETOROLAC TROMETHAMINE 30 MG/ML
15 INJECTION, SOLUTION INTRAMUSCULAR; INTRAVENOUS ONCE
Status: COMPLETED | OUTPATIENT
Start: 2021-02-01 | End: 2021-02-01

## 2021-02-01 RX ORDER — CLONAZEPAM 1 MG/1
TABLET ORAL
Qty: 30 TABLET | Refills: 0 | Status: SHIPPED | OUTPATIENT
Start: 2021-02-01 | End: 2021-03-15

## 2021-02-01 RX ORDER — FENTANYL CITRATE 50 UG/ML
12.5-25 INJECTION, SOLUTION INTRAMUSCULAR; INTRAVENOUS EVERY 5 MIN PRN
Status: DISCONTINUED | OUTPATIENT
Start: 2021-02-01 | End: 2021-03-16

## 2021-02-01 RX ADMIN — FENTANYL CITRATE 25 MCG: 50 INJECTION, SOLUTION INTRAMUSCULAR; INTRAVENOUS at 10:52

## 2021-02-01 RX ADMIN — FENTANYL CITRATE 25 MCG: 50 INJECTION, SOLUTION INTRAMUSCULAR; INTRAVENOUS at 10:47

## 2021-02-01 RX ADMIN — KETOROLAC TROMETHAMINE 15 MG: 30 INJECTION, SOLUTION INTRAMUSCULAR; INTRAVENOUS at 10:48

## 2021-02-01 RX ADMIN — FENTANYL CITRATE 25 MCG: 50 INJECTION, SOLUTION INTRAMUSCULAR; INTRAVENOUS at 11:05

## 2021-02-01 ASSESSMENT — PAIN SCALES - GENERAL: PAINLEVEL: WORST PAIN (10)

## 2021-02-01 NOTE — PATIENT INSTRUCTIONS
[facility]   Radiofrequency Ablation (RFA) Discharge Instructions     Today you saw:  Dr. Silvia Gamble        You should anticipate procedural pain for up to 2 weeks.     You may receive a prescription for pain medication and you should take this as directed.    stop the oxycodone   Take Percocet 1 tab every 4 hours as needed    It may take up to 8 weeks to receive relief from the RFA     Follow up with your provider in 6-8 weeks.     If you received sedation before, during or after your procedure, for the next 24 hours you shall NOT:    -Drive    -Operate machinery    -Drink alcohol    -Sign any legal documents     You may resume your normal diet     You may resume your regular medications after the procedure     If you were holding your blood thinning medication, please restart taking it: ..    Be cautious with walking. Numbness and/or weakness in the lower extremities may occur for up to 6-8 hours due to effect of local anesthetic    Avoid strenuous activity for the first 24 hours     You may resume your regular activities after 24 hours     You may shower, however no swimming, tub baths or hot tubs for 24 hours following your procedure     You may use ice packs 10-15 minutes three to four times a day at the injection site for comfort     Do not use heat to painful areas for 6 to 8 hours. This will give the local anesthetic time to wear off and prevent you from accidentally burning your skin.     Unless you have been directed to avoid the use of anti-inflammatory medications (NSAIDS), you may use medications such as ibuprofen, Aleve or Tylenol for pain control if needed.     If you experience any of the following, call the Pain Clinic during work hours (Monday through Friday 8 am-4:30 pm) at [phone] or the Provider Line after hours at [phone]:   -Fever over 100 degree F    -Swelling, bleeding, redness, drainage, warmth at the injection site    -Progressive weakness or  numbness in your legs or arms    -Loss of bowel or bladder function    -Unusual headache that is not relieved by Tylenol    -Unusual new onset of pain that is not improving

## 2021-02-01 NOTE — NURSING NOTE
Pre-procedure Intake    Have you been fasting? Yes     If yes, for how long? 6 hrs     Are you taking a prescribed blood thinner such as coumadin, Plavix, Xarelto?    No    If yes, when did you take your last dose? No     Do you take aspirin?  No    If cervical procedure, have you held aspirin for 6 days?   No     Do you have any allergies to contrast dye, iodine, steroid and/or numbing medications?  NO    Are you currently taking antibiotics or have an active infection?  NO    Have you had a fever/elevated temperature within the past week? NO    Are you currently taking oral steroids? NO    Do you have a ? Yes       Are you pregnant or breastfeeding?  Not Applicable    Are the vital signs normal?  Yes  Nicolasa Krueger MA

## 2021-02-01 NOTE — NURSING NOTE
Discharge Information    IV Discontiued Time:  1130    Amount of Fluid Infused:  300    Discharge Criteria = When patient returns to baseline or as per MD order    Consciousness:  Pt is fully awake    Circulation:  BP +/- 20% of pre-procedure level    Respiration:  Patient is able to breathe deeply    O2 Sat:  Patient is able to maintain O2 Sat >92% on room air    Activity:  Moves 4 extremities on command    Ambulation:  Patient is able to stand and walk or stand and pivot into wheelchair    Dressing:  Clean/dry or No Dressing    Notes:   Discharge instructions and AVS given to patient    Patient meets criteria for discharge?  YES    Admitted to PCU?  No    Responsible adult present to accompany patient home?  Yes    Signature/Title:    Josias Lopez RN  RN Care Coordinator  [facility] [place]

## 2021-02-01 NOTE — PROGRESS NOTES
Pre procedure Diagnosis: facet arthropathy   Post procedure Diagnosis: Same  Procedure performed: bilateral lumbar radiofrequency ablation   Anesthesia: moderate sedation with  fentanyl 75 mcg IV; versed 1 mg IV; toradol 15 mg IV  Complications: none  Operators: Ashlyn Gamble MD, Girish Renae MD pain fellow      Indications:   Jailene Breen is a 55 year old male was a referred by Shahzad CABEZAS.  They have a history of axial low back pain today R>L.  Exam shows pain on extension and lateral rotation and they have tried conservative treatment including medications, therapy and previous lumbar RFA.    Jailene Breen had 60% improvement from last radiofrequency ablation     Options/alternatives, benefits and risks were discussed with the patient including bleeding, infection, no pain relief, tissue trauma, exposure to radiation, reaction to medications including seizure, spinal cord injury,increased pain after the procedure, weakness, numbness or sensory changes and headache.   We also discussed risks of sedation, including reaction to medications and cardiovascular collapse.    Questions were answered to his satisfaction and he agrees to proceed. Voluntary informed consent was obtained and signed.     Vitals were reviewed: Yes  Allergies were reviewed:  Yes   Medications were reviewed:  Yes   Pre-procedure pain score: 10/10  COVID negative     Procedure:  After getting informed consent, patient was brought into the procedure suite and was placed in a prone position on the procedure table.   A Pause for the Cause was performed.  Patient was prepped and draped in sterile fashion.     Jailene Breen had an IV line placed prior the procedure.  The C-arm was positioned in the right  oblique view to afford optimal view of the L4-L5 vertebral bodies. Lidocaine 1% was used to anesthetize the skin at each level.  At each level, a 10cm, 20G curved radiofrequency cannula with a 10mm active tip was positioned,  overlying the intersection of the transverse process and pedicle at L4 & L5, and was advanced under intermittent fluoroscopy until it contacted the transverse process and notch, and the tip slightly overran that process, just lateral to the mamillary process.  The position of each cannula was verified and optimized in the oblique view and AP views.    In the AP view, another cannula was placed at the sacral alar notch.      Each position was tested for motor and sensory stimulation, and was positioned so that stimulation was negative for stimuli outside the immediate area of the desired lesion.  Sensory stimulation was completed at 50 Hz, with max stimulation up to 0.8V.  Motor stimulation was completed at 2Hz, up to 2.5V.   Bupivacaine mixed 1:1 with lidocaine 1% 1.2ml was injected, and a 90 second, 80 degree Centigrade lesion was generated.    The needles were then rotated within the pathway of the medial branch, and locations were evaluated with repeat imaging.  Motor testing was again completed, and showed appropriate stimulation.  A second lesion was then generated at each location.    The needles were flushed with the local mixture as they were withdrawn.  Hemostasis was achieved.      Hemostasis was achieved, the area was cleaned, and bandaids were placed when appropriate.  The patient tolerated the procedure well, and was taken to the recovery room.    Images were saved to PACS.    For future RFA procedures, consider using 15 cm 20 G radiofrequency needles. The 10 cm needle hub was at the skin, we were able to make contact with os, however with just enough    Sedation start time:  1043  Sedation end time:  1120    Post-procedure pain score: 2/10  Follow-up includes:   -f/u phone call in one week  -post-procedure pain medications: Oxycodone- acetaminophen 6/day.  Will stop oxycodone during that time.  -f/u with Susana Pike NP in 6-8 weeks    Ashlyn Gamble MD  [facility]    [place]  [facility] Procedure Note        Sedation:      Performed by: Silvia Gamble MD  Authorized by: Silvia Gamble MD    Pre-Procedure Assessment done at 1000.    Sedation Level:  Moderate Sedation    Indication:  Sedation is required to allow for radiofrequency ablation of lumbar spine    Consent obtained from patient after discussing the risks, benefits and alternatives.    PO Intake:  Appropriately NPO for procedure    ASA Class:  Class 3 - SEVERE SYSTEMIC DISEASE, DEFINITE FUNCTIONAL LIMITATIONS.    Mallampati:  Grade 1:  Soft palate, uvula, tonsillar pillars, and posterior pharyngeal wall visible    Lungs: Lungs Clear with good breath sounds bilaterally.     Heart: Normal heart sounds and rate    History and physical reviewed and no updates needed. I have reviewed the lab findings, diagnostic data, medications, and the plan for sedation. I have determined this patient to be an appropriate candidate for the planned sedation and procedure and have reassessed the patient IMMEDIATELY PRIOR to sedation and procedure.      Sedation Post Procedure Summary:    Prior to the start of the procedure and with procedural staff participation, I verbally confirmed the patient s identity using two indicators, relevant allergies, that the procedure was appropriate and matched the consent or emergent situation, and that the correct equipment/implants were available. Immediately prior to starting the procedure I conducted the Time Out with the procedural staff and re-confirmed the patient s name, procedure, and site/side. (The Joint Commission universal protocol was followed.)  Yes      Sedatives: Fentanyl and Midazolam (Versed)    Vital signs, airway, and pulse oximetry were monitored and remained stable throughout the procedure and sedation was maintained until the procedure was complete.  The patient was monitored by staff until sedation discharge criteria were met.    Patient tolerance: Patient tolerated the  procedure well with no immediate complications.    Time of sedation in minutes:  37 minutes from beginning to end of physician one to one monitoring.

## 2021-02-01 NOTE — PATIENT INSTRUCTIONS
Nice to see you today Les. As we discussed:   - Continue current medications  - Try to limit Klonopin as much as possible.    ------------------------------------------------------------------------    Thank you for coming to the PSYCHIATRY CLINIC.    Lab Testing:  If you had lab testing today and your results are reassuring or normal they will be mailed to you or sent through [employer] within 7 days. If the lab tests need quick action we will call you with the results. The phone number we will call with results is # [phone] (home) . If this is not the best number please call our clinic and change the number.    Medication Refills:  If you need any refills please call your pharmacy and they will contact us. Our fax number for refills is [phone]. Please allow three business for refill processing. If you need to  your refill at a new pharmacy, please contact the new pharmacy directly. The new pharmacy will help you get your medications transferred.     Scheduling:  If you have any concerns about today's visit or wish to schedule another appointment please call our office during normal business hours [phone] (8-5:00 M-F)    Contact Us:  Please call [phone] during business hours (8-5:00 M-F).  If after clinic hours, or on the weekend, please call  [phone].    Financial Assistance [phone]  [facility] Billing [phone]  Central Billing Office, [facility]: [phone]  [facility] Billing [phone]  Medical Records [phone]      MENTAL HEALTH CRISIS NUMBERS:  For a medical emergency please call  911 or go to the nearest ER.     [facility]:   [facility] -[phone]   Crisis Residence [facility] -[phone]   Walk-In Counseling Center [facility] -[phone]   COPE 24/7 [place] Mobile Team -[phone] (adults)/[phone] (child)  CHILD: [facility] needs assessment team - [phone]      [facility] - [phone]    Walk-in counseling [facility] - [phone]   Walk-in counseling [facility] - [phone]   Crisis Residence [facility] Residence - [phone]  Urgent Care Adult Mental Ubntjb-[phone] mobile unit/ 24/7 crisis line    National Crisis Numbers:   National Suicide Prevention Lifeline: 8-742-754-TALK (915-636-4802)  Poison Control Center - 2-693-645-4354  [employer]/resources for a list of additional resources (SOS)  Trans Lifeline a hotline for transgender people 3-458-079-9833  [employer] a hotline for LGBT youth 1-332.299.6618  Crisis Text Line: For any crisis 24/7   To: 036927  see www.crisistextline.org  - IF MAKING A CALL FEELS TOO HARD, send a text!         Again thank you for choosing PSYCHIATRY CLINIC and please let us know how we can best partner with you to improve you and your family's health.    You may be receiving a survey regarding this appointment. We would love to have your feedback, both positive and negative. The survey is done by an external company, so your answers are anonymous.

## 2021-02-01 NOTE — NURSING NOTE
22 gauge Peripheral IV inserted into left hand - attempts: 1      MD Time IN: 1041  Sedation start time:  1043  Sedation end time:  1120    Medications given: fentanyl 75 mcg IV; versed 1 mg IV; toradol 15 mg IV  Intravenous fluids were administered, normal saline 300 cc's.  Sedation Level Achieved:  Moderate (conscious) sedation    Josias Lopez, RN  Care Coordinator   [facility] [place]

## 2021-02-01 NOTE — PROGRESS NOTES
TELEPHONE VISIT  Jailene Breen is a 55 year old pt. who is being evaluated via a billable telephone visit.      The patient has been notified of the following:    We have found that certain health care needs can be provided without the need for a physical exam. This service lets us provide the care you need with a short phone conversation. If a prescription is necessary we can send it directly to your pharmacy. If lab work is needed we can place an order for that and you can then stop by our lab to have the test done at a later time. Insurers are generally covering virtual visits as they would in-office visits so billing should not be different than normal.  If for some reason you do get billed incorrectly, you should contact the billing office to correct it and that number is in the AVS .    Patient has given verbal consent for a telephone visit?:  Yes   How would the pt like to obtain the AVS?:  Superpedestrian  AVS SmartPhrase [PsychAVS] has been placed in 'Patient Instructions':  Yes     Start Time:  8:01 AM        End Time:  8:35 AM         [facility]  Psychiatry Clinic  PSYCHIATRIC PROGRESS NOTE     CARE TEAM:    PCP- Aiden Resendez   Rheumatology- Raghu Parada MD  [facility]- Susana GRANT; Santa Farrell PsyD    Jailene Breen is a 55 year old patient who prefers the name Les and uses pronouns he, him, his.     PERTINENT BACKGROUND        [most recent eval 09/17/20]     Psych critical item history includes suicidal ideation, SIB [cutting], mutiple psychotropic trials , severe med reaction, psych hosp (<3), SUBSTANCE USE: alcohol, substance use treatment  and Major Medical Problems (Rheumatoid Arthritis and Ankylosing Spondylitis)    INTERIM HISTORY      [4, 4]   The patient reports good treatment adherence.  History was provided by the patient who was a good historian.     Since the last visit:   - Increase escitalopram 20mg  "daily  - Has been arguing more with wife, primarily related to financial stress.  - Going through \"several medical issues\". Endoscopy finding hiatal hernia needing repair, cauterizing nerves in back, and chronic pain.  - He's been going through family therapy.  - Wife \"accuses me of taking more meds than I was prescribed.\" States that he's not as he gets tested frequently. She said \"she doesn't count my meds\" but then he took \"a bunch out of my meds (out of his bottles) to see\" and ran to the store \"then she was yelling at me that I took too many.\"  - \"The pills were never the issue for me. It was the drinking.\"  - Has not been able to start lightbox yet due to finances.  - Since increasing the escitalopram, \"no longer have the hole in the gut feeling\" but I still have a lot of other stressors and \"think most of it's related to that.\"  - Still procrastinate for school. But, \"once I get started I really get into it.\"  - Feels overwhelmed at times with \"so much work\" and \"if I just get the pain under control things would be better.\"  - \"Everything is getting a little better, it's just not quite there yet.\"  - Anxiety \"is probably getting a little bit better.\" Because he's not taking \"as much of the anxiety meds.\" Usually, \"will always try to take the hydroxyzine first before the Klonopin\". Usually only takes Klonopin 2-3 times a month. Has a refill at the pharmacy but hasn't picked it up yet, \"I'm waiting until I need it.\"  - Does not take Klonopin if he has taking oxycodone that evening and visa-versa    RECENT PSYCH ROS:   Depression: improving, as above, depressed mood, low energy, poor concentration /memory and feeling worthless  Elevated:  none  Psychosis:  none  Anxiety:  as above  Trauma Related:  none  Dysregulation:  none  Eating Disorder: no  Other: no    Adverse Effects:  denies  Pertinent Negative Symptoms: No suicidal ideation,  violent ideation, aggression, psychosis, hallucinations, delusions, otto and " "hypomania    RECENT SUBSTANCE USE:     Alcohol- none since , used to attend AA meetings prior to COVID.  Tobacco- denies  Caffeine- yes  Cannabis- denies     Opioids- as prescribed           Narcan Kit- prescribed   Other illicit drugs- none    FAMILY and SOCIAL HISTORY   [1ea, 1ea]           [per patient report]            FAMILY HX:  Mom - depression (since  when sister )    SOCIAL HX:  Financial/ Work- currently attending college at [employer], is hoping to start school at [employer] for a degree in psychology after being unable to work due to diagnosis of rheumatoid arthritis and ankylosing spondylitis  Partner/ -  in .  Children- 23 and 25 year old kids      Living situation- [place], house with wife and 2 kids      Social/ Spiritual Support- Talks to  every two weeks for lunch, but \"doesn't really have friends\". Family is supportive.     Legal- yes, history of DUI.    PSYCH and SUBSTANCE USE Critical Summary Points since  - started escitalopram  November - increased escitalopram 20mg daily    MEDICAL HISTORY  and ALLERGY     ALLERGIES: Mirtazapine, Hydrocodone, Ms contin [morphine], and Remeron soltab     Patient Active Problem List   Diagnosis     CARDIOVASCULAR SCREENING; LDL GOAL LESS THAN 160     Anxiety     Overweight (BMI 25.0-29.9)     Back pain     Tear meniscus knee, right, initial encounter     Rheumatoid arthritis involving multiple sites, unspecified rheumatoid factor presence     Chronic pain     Right-sided thoracic back pain, unspecified chronicity     JEFF (generalized anxiety disorder)     Panic attack     Syncope, unspecified syncope type     Ankylosing spondylitis (H)     Tobacco use disorder     Moderate major depression (H)     Immunocompromised (H)         MEDICAL REVIEW OF SYSTEMS    [2, 10]   Contraception- none    A comprehensive review of systems was performed and is negative other than noted in the HPI.    MEDICATIONS     "      Current Outpatient Medications   Medication Sig Dispense Refill     albuterol (PROAIR HFA/PROVENTIL HFA/VENTOLIN HFA) 108 (90 Base) MCG/ACT inhaler Inhale 2 puffs into the lungs every 6 hours as needed for shortness of breath / dyspnea (Patient not taking: Reported on 1/16/2021) 1 Inhaler 0     atenolol (TENORMIN) 25 MG tablet Take 25 mg by mouth daily       benzonatate (TESSALON) 200 MG capsule Take 1 capsule (200 mg) by mouth 3 times daily as needed for cough 20 capsule 0     benzonatate (TESSALON) 200 MG capsule Take 1 capsule (200 mg) by mouth 3 times daily as needed for cough 20 capsule 0     clonazePAM (KLONOPIN) 1 MG tablet TAKE ONE-HALF - 1 TABLET BY MOUTH ONCE DAILY AS NEEDED FOR ANXIETY 30 tablet 0     cyclobenzaprine (FLEXERIL) 10 MG tablet Take 0.5-1 tablets (5-10 mg) by mouth 3 times daily as needed for muscle spasms Caution sedation (Patient not taking: Reported on 1/16/2021) 60 tablet 2     escitalopram (LEXAPRO) 10 MG tablet Take 2 tablets (20 mg) by mouth daily 60 tablet 2     etanercept (ENBREL SURECLICK) 50 MG/ML autoinjector Inject 50 mg Subcutaneous once a week 1 kit 2     folic acid (FOLVITE) 1 MG tablet Take 3 tablets (3 mg) by mouth daily 180 tablet 3     guaiFENesin-codeine (ROBITUSSIN AC) 100-10 MG/5ML solution Take 5-10 mLs by mouth every 4 hours as needed for cough (Patient not taking: Reported on 2/1/2021) 180 mL 0     guaiFENesin-codeine (ROBITUSSIN AC) 100-10 MG/5ML solution Take 5-10 mLs by mouth every 4 hours as needed for cough (Patient not taking: Reported on 1/16/2021) 180 mL 0     hydrOXYzine (ATARAX) 25 MG tablet Take 1 tablet (25 mg) by mouth daily as needed (for panic and severe anxiety) 30 tablet 2     IBUPROFEN PO Take 800 mg by mouth every 6 hours as needed for moderate pain Reported on 5/19/2017       methotrexate 2.5 MG tablet Take 4 tablets (10 mg) by mouth every 7 days Labs every 8 - 12 weeks for refills. Due now. 48 tablet 0     naloxone (NARCAN) 4 MG/0.1ML nasal  "spray Spray 1 spray (4 mg) into one nostril alternating nostrils as needed for opioid reversal every 2-3 minutes until assistance arrives 0.2 mL 0     nicotine (NICORETTE) 2 MG gum Place 2 each (4 mg) inside cheek as needed for smoking cessation (Patient not taking: Reported on 1/16/2021) 240 each 12     nicotine polacrilex (NICORETTE) 4 MG gum Place 1 each (4 mg) inside cheek as needed for smoking cessation 240 each 6     omeprazole (PRILOSEC) 20 MG DR capsule Take 1 capsule (20 mg) by mouth 2 times daily 84 capsule 0     oxyCODONE (OXYCONTIN) 10 MG 12 hr tablet Take 1 tablet (10 mg) by mouth every 12 hours Fill 1/31/2021. Begin 2/2/2021 60 tablet 0     oxyCODONE (ROXICODONE) 5 MG tablet Take 1 tablet (5 mg) by mouth every 6 hours as needed for severe pain Max of 3 tabs/day.  Okay to dispense 1/7/2021 and start 1/9/2021 90 tablet 0     oxyCODONE-acetaminophen (PERCOCET) 5-325 MG tablet Take 1 tablet by mouth every 4 hours as needed for severe pain (post procedure pain) Stop oxycodone while taking this new dose for chronic and post-procedure pain 60 tablet 0     predniSONE (DELTASONE) 20 MG tablet Take 1 tablet (20 mg) by mouth daily (Patient not taking: Reported on 1/16/2021) 5 tablet 0     tamsulosin (FLOMAX) 0.4 MG capsule Take 1 capsule (0.4 mg) by mouth daily (Patient not taking: Reported on 1/16/2021) 7 capsule 0     VITALS                     [3, 3]   There were no vitals taken for this visit.   MENTAL STATUS EXAM     [9, 14 cog gs]     Alertness: alert  and oriented  Appearance: N/A (phone visit)  Behavior/Demeanor: cooperative, with N/A (phone visit) eye contact   Speech: regular rate and rhythm  Language: intact  Psychomotor: N/A (phone visit)  Mood: \"getting better\"   Affect: N/A (phone visit); congruent to: mood- yes, content- yes  Thought Process/Associations: unremarkable  Thought Content:  Reports none;  Denies suicidal & violent ideation and delusions  Perception:  Reports none;  Denies auditory " "hallucinations and visual hallucinations  Insight: good  Judgment: good  Cognition: (6) oriented: time, person, and place  attention span: intact  concentration: intact  recent memory: intact  remote memory: intact  fund of knowledge: appropriate  Gait and Station: N/A (telehealth)    LABS and DATA     PHQ9 TODAY = not completed due to telehealth  PHQ 3/27/2019 10/6/2020 12/15/2020   PHQ-9 Total Score 17 14 15   Q9: Thoughts of better off dead/self-harm past 2 weeks Not at all Not at all Not at all       Recent Labs   Lab Test 09/10/20  1237 07/13/20  0853 02/21/17  0840   CR 0.97 0.96 0.92   GFRESTIMATED 89 90 87     Recent Labs   Lab Test 09/10/20  1237 07/13/20  0853 10/07/16  0948   AST 27 49* 15   ALT 50 85* 25   ALKPHOS  --   --  80       PSYCHOTROPIC DRUG INTERACTIONS     Increased risk of QTc prolongation: Flexeril, hydroxyzine, escitalopram  Increased risk of serotonin syndrome: Flexeril, escitalopram, oxycodone  Increased risk of CNS depression and paralytic ileus: oxycodone, hydroxyzine    MANAGEMENT:  Monitoring for adverse effects and patient is aware of risks    RISK STATEMENT for SAFETY     Jailene Breen did not appear to be an imminent safety risk to self or others.    My biggest concern for this patient is he reports limited social support,  from [employer] whom he meets with every other week and family however with few friends, and repeats of previously \"lying\" to providers to be discharged from the hospital after presenting with acute suicidal ideation.  However, mitigating these risks is significant engagement with pain therapist, couples therapist, initiation with individual CBT therapist, and this clinician.  He has also expressed future orientation and is currently pursuing college degree which she finds very reporting an attempt to get psychology degree.  He is hopeful that he could find a job in this field given his physical limitations.    DIAGNOSES                            [m2, h3]  "   Major Depressive Disorder, recurrent, moderate  Generalized Anxiety Disorder, with panic    ASSESSMENT                         [m2, h3]        Patient presents for medication management of depression and anxiety. Improved mood since last appointment with increased escitalopram. Still has ongoing pain and psychosocial stressors, including finances and conflicts with wife, which contribute primarily to his ongoing symptoms and distress. He is able to manage these however and finds that he is able to concentrate and complete tasks once he starts them. He also feels like anxiety has improved as he is using hydroxyzine and Klonopin less. Given concern of combination benzo + opioid discussed alternate medication to Klonopin, including quetiapine, which patient was not wanting to consider at this time. He would like to continue current medication regimen with follow-up to monitor for continued changes/improvements. Refills provided.     [place] was [place] today:  Indicates no medication misuse .    PLAN                                                   [m2, h3]                                               1) Medications  - Continue escitalopram 20mg daily   - Continue hydroxyzine 25mg daily as needed for anxiety and panic attacks    - clonazepam 1mg by PCP    2) Therapy-  Continue with couples and pain therapy.    3) Next Due   Labs- as needed  EKG- consider repeat EKG given multiple medications with risk of EKG prolongation, if patient continues to use hydroxyzine more frequently.  Rating scales- serial PHQ9s    4) Referrals  No Referrals needed     5) RTC: 6 weeks    6) Crisis Numbers:   Provided routinely in AVS     After hours:  [phone]      TREATMENT RISK STATEMENT:  The risks, benefits, alternatives and potential adverse effects have been discussed and are understood by the pt. The pt understands the risks of using street drugs or alcohol. There are no medical contraindications, the pt agrees to treatment with  the ability to do so. The pt knows to call the clinic for any problems or to access emergency care if needed.  Medical and substance use concerns are documented above.  Psychotropic drug interaction check was done, including changes made today.    PROVIDER: Slim Sage MD    Patient was not staffed in clinic.  Supervisor is Dr. Solo who will sign the note.  I did not see this patient directly. I have reviewed the documentation and I agree with the resident's plan of care.     Chanelle Solo MD

## 2021-02-02 ENCOUNTER — MYC MEDICAL ADVICE (OUTPATIENT)
Dept: FAMILY MEDICINE | Facility: CLINIC | Age: 56
End: 2021-02-02

## 2021-02-02 ENCOUNTER — TELEPHONE (OUTPATIENT)
Dept: PSYCHIATRY | Facility: CLINIC | Age: 56
End: 2021-02-02

## 2021-02-02 ENCOUNTER — VIRTUAL VISIT (OUTPATIENT)
Dept: PSYCHIATRY | Facility: CLINIC | Age: 56
End: 2021-02-02
Attending: PSYCHIATRY & NEUROLOGY
Payer: COMMERCIAL

## 2021-02-02 DIAGNOSIS — F33.1 MAJOR DEPRESSIVE DISORDER, RECURRENT EPISODE, MODERATE (H): ICD-10-CM

## 2021-02-02 DIAGNOSIS — F41.0 PANIC ATTACK: ICD-10-CM

## 2021-02-02 DIAGNOSIS — F41.1 GAD (GENERALIZED ANXIETY DISORDER): ICD-10-CM

## 2021-02-02 PROCEDURE — 99214 OFFICE O/P EST MOD 30 MIN: CPT | Mod: U7 | Performed by: STUDENT IN AN ORGANIZED HEALTH CARE EDUCATION/TRAINING PROGRAM

## 2021-02-02 RX ORDER — HYDROXYZINE HYDROCHLORIDE 25 MG/1
25 TABLET, FILM COATED ORAL DAILY PRN
Qty: 30 TABLET | Refills: 2 | Status: SHIPPED | OUTPATIENT
Start: 2021-02-02 | End: 2021-02-25

## 2021-02-02 RX ORDER — ESCITALOPRAM OXALATE 10 MG/1
20 TABLET ORAL DAILY
Qty: 60 TABLET | Refills: 2 | Status: SHIPPED | OUTPATIENT
Start: 2021-02-02 | End: 2021-02-25

## 2021-02-02 NOTE — TELEPHONE ENCOUNTER
On February 2, 2021, at 7:36 AM, writer called patient at [phone] to confirm Virtual Visit. Writer unable to make contact with patient. Writer left detailed voice message for call back. [phone] left as call back number. Alyssa Koch, [facility]

## 2021-02-02 NOTE — Clinical Note
Did you ever initiate therapy with this patient? He reported some interest today in CBT, let me know if you have space of if I should refer him elsewhere?  Thanks, Slim

## 2021-02-03 ENCOUNTER — MYC MEDICAL ADVICE (OUTPATIENT)
Dept: FAMILY MEDICINE | Facility: CLINIC | Age: 56
End: 2021-02-03

## 2021-02-03 NOTE — TELEPHONE ENCOUNTER
E-visit/Virtual visit/Telephone visit instructions given to Jailene to further discuss cough.     Chantel Staley RN, BSN, PHN

## 2021-02-04 ENCOUNTER — VIRTUAL VISIT (OUTPATIENT)
Dept: FAMILY MEDICINE | Facility: OTHER | Age: 56
End: 2021-02-04
Payer: COMMERCIAL

## 2021-02-04 PROCEDURE — 99421 OL DIG E/M SVC 5-10 MIN: CPT | Performed by: PHYSICIAN ASSISTANT

## 2021-02-04 NOTE — PROGRESS NOTES
"Date: 2021 08:03:59  Clinician: Chanda Richard  Clinician NPI: [id number]  Patient: Jailene Breen  Patient : 1965  [address] Address[address] [place] [address]  Patient Phone: [phone]  Visit Protocol: URI  Patient Summary:  Jailene is a 55 year old ( : 1965 ) male who initiated a [facility] Visit for cold, sinus infection, or influenza. When asked the question \"Please sign me up to receive news, health information and promotions from [facility].\", Jailene responded \"No\".    Jailene states his symptoms started 1-2 days ago.   His symptoms consist of a headache, chills, vomiting, malaise, a cough, nasal congestion, nausea, and wheezing. He is experiencing mild difficulty breathing with daily activities but can speak normally in full sentences.   Symptom details     Nasal secretions: The color of his mucus is clear.    Cough: Jailene coughs every 5-10 minutes and his cough is more bothersome at night. Phlegm does not come into his throat when he coughs. He does not believe his cough is caused by post-nasal drip.     Wheezing: Additional wheezing details as reported by the patient (free text): Besides a high pitch it also sounds like a bowl rice krispies when you pour milk on it.       Headache: He states the headache is mild (1-3 on a 10 point pain scale).      Jailene denies having ear pain, myalgias, rhinitis, fever, teeth pain, ageusia, diarrhea, facial pain or pressure, anosmia, and sore throat. He also denies having recent facial or sinus surgery in the past 60 days.   Precipitating events  He has not recently been exposed to someone with influenza. Jailene has not been in close contact with any high risk individuals.   Pertinent COVID-19 (Coronavirus) information  Jailene does not work or volunteer as healthcare worker or a . In the past 14 days, Jailene has not worked or volunteered at a healthcare facility or group living setting.   In the past 14 days, he also has not lived " in a congregate living setting.   Jailene has not had a close contact with a laboratory-confirmed COVID-19 patient within 14 days of symptom onset.    Jailene has been tested for COVID-19.      Date(s) of his COVID-19 test as reported by the patient (free text): 01/29/2021       Result of COVID-19 test as reported by the patient (free text): Negative       Type of test as reported by the patient (free text): Nasal        Jailene has not received a COVID-19 vaccine.   Pertinent medical history  Jailene has taken an antibiotic medication in the past month. Antibiotic details as reported by the patient (free text): Z pack   He has been told by his provider to avoid NSAIDs.   Jailene does not have diabetes. He denies having immunosuppressive conditions (e.g., chemotherapy, HIV, organ transplant, long-term use of steroids or other immunosuppressive medications, splenectomy). He denies having congestive heart failure and severe COPD. He does not have asthma.   Jailene does not need a return to work/school note.   Jailene does not smoke or use smokeless tobacco.   Weight: 275 lbs    MEDICATIONS: hydroxyzine HCl oral, benzonatate oral, omeprazole-sodium bicarbonate oral, oxycodone oral, OxyContin oral, methotrexate sodium oral, folic acid oral, atenolol oral, Lexapro oral, Enbrel subcutaneous, ALLERGIES: Remeron, morphine, hydrocodone  Clinician Response:  Dear Jailene,  Based on the information provided, you have a viral upper respiratory infection, otherwise known as a cold. Symptoms vary from person to person, but can include sneezing, coughing, a runny nose, sore throat, and headache and range from mild to severe.  Unfortunately, there are no medications that can cure a cold, so treatment is focused on controlling symptoms as much as possible. Most people gradually feel better until symptoms are gone in 1-2 weeks.  Medication information  Because you have a viral infection, antibiotics will not help you get better. Treating a  viral infection with antibiotics could actually make you feel worse.  Self care  Steps you can take to be as comfortable as possible:     Rest.    Drink plenty of fluids.    Take a warm shower to loosen congestion    Use a cool-mist humidifier.    Take a spoonful of honey to reduce your cough.     When to seek care  Please be seen in a clinic or urgent care if any of the following occur:   New symptoms develop, or symptoms become worse   Call ahead before going to the clinic or urgent care.  For the latest updates on COVID-19 (Coronavirus), please visit the Centers for Disease Control and Prevention (CDC).   Diagnosis: Viral URI  Diagnosis ICD: J06.9

## 2021-02-08 ENCOUNTER — MYC MEDICAL ADVICE (OUTPATIENT)
Dept: PALLIATIVE MEDICINE | Facility: CLINIC | Age: 56
End: 2021-02-08

## 2021-02-08 DIAGNOSIS — M06.9 RHEUMATOID ARTHRITIS INVOLVING MULTIPLE JOINTS (H): ICD-10-CM

## 2021-02-08 DIAGNOSIS — G89.4 CHRONIC PAIN SYNDROME: ICD-10-CM

## 2021-02-08 RX ORDER — OXYCODONE HYDROCHLORIDE 5 MG/1
5 TABLET ORAL EVERY 6 HOURS PRN
Qty: 90 TABLET | Refills: 0 | Status: SHIPPED | OUTPATIENT
Start: 2021-02-08 | End: 2021-03-04

## 2021-02-08 NOTE — TELEPHONE ENCOUNTER
Routed to the nursing pool and to the MA pool to process refill(s).    Cha Molina, RN-BSN  [facility]

## 2021-02-08 NOTE — TELEPHONE ENCOUNTER
Signed Prescriptions:                        Disp   Refills    oxyCODONE (ROXICODONE) 5 MG tablet         90 tab*0        Sig: Take 1 tablet (5 mg) by mouth every 6 hours as needed           for severe pain Max of 3 tabs/day.  Okay to           dispense 2/6/2021 and start 2/8/2021  Authorizing Provider: SUSANA PETTY    Reviewed [place]  February 8, 2021- no concerning fills.    Susana Petty APRN, RN CNP, FNP  [facility]  [facility]

## 2021-02-08 NOTE — TELEPHONE ENCOUNTER
Medication refill information reviewed.     Due date for oxyCODONE (ROXICODONE) 5 MG tablet is 2/8/21     Prescriptions prepped for review.     Will route to provider.     Josias Lopez, RN  Care Coordinator   [facility] [place]

## 2021-02-08 NOTE — TELEPHONE ENCOUNTER
Patient requesting refill(s) of oxyCODONE (ROXICODONE) 5 MG tablet    Last dispensed from pharmacy on 1/7/2021    Patient's last office/virtual visit by prescribing provider on 2/1/21  Next office/virtual appointment scheduled for None     Last urine drug screen date 01/21/2020  Current opioid agreement on file (completed within the last year) Yes Date of opioid agreement: 1/21/21    E-prescribe to [id number] - [facility] - [address]     Will route to nursing [place] for review and preparation of prescription(s).

## 2021-02-15 ENCOUNTER — ANCILLARY PROCEDURE (OUTPATIENT)
Dept: GENERAL RADIOLOGY | Facility: CLINIC | Age: 56
End: 2021-02-15
Attending: NURSE PRACTITIONER
Payer: COMMERCIAL

## 2021-02-15 ENCOUNTER — VIRTUAL VISIT (OUTPATIENT)
Dept: FAMILY MEDICINE | Facility: CLINIC | Age: 56
End: 2021-02-15
Payer: COMMERCIAL

## 2021-02-15 ENCOUNTER — MYC MEDICAL ADVICE (OUTPATIENT)
Dept: FAMILY MEDICINE | Facility: CLINIC | Age: 56
End: 2021-02-15

## 2021-02-15 ENCOUNTER — MYC MEDICAL ADVICE (OUTPATIENT)
Dept: SURGERY | Facility: CLINIC | Age: 56
End: 2021-02-15

## 2021-02-15 DIAGNOSIS — J20.9 ACUTE BRONCHITIS, UNSPECIFIED ORGANISM: ICD-10-CM

## 2021-02-15 DIAGNOSIS — J06.9 UPPER RESPIRATORY TRACT INFECTION, UNSPECIFIED TYPE: Primary | ICD-10-CM

## 2021-02-15 DIAGNOSIS — R05.9 COUGH: ICD-10-CM

## 2021-02-15 DIAGNOSIS — R05.9 COUGH: Primary | ICD-10-CM

## 2021-02-15 DIAGNOSIS — M06.9 RHEUMATOID ARTHRITIS INVOLVING MULTIPLE SITES, UNSPECIFIED WHETHER RHEUMATOID FACTOR PRESENT (H): ICD-10-CM

## 2021-02-15 PROCEDURE — 99213 OFFICE O/P EST LOW 20 MIN: CPT | Mod: 95 | Performed by: NURSE PRACTITIONER

## 2021-02-15 PROCEDURE — 71046 X-RAY EXAM CHEST 2 VIEWS: CPT | Performed by: RADIOLOGY

## 2021-02-15 RX ORDER — GUAIFENESIN 600 MG/1
1200 TABLET, EXTENDED RELEASE ORAL 2 TIMES DAILY
Qty: 20 TABLET | Refills: 0 | Status: SHIPPED | OUTPATIENT
Start: 2021-02-15 | End: 2021-02-15 | Stop reason: ALTCHOICE

## 2021-02-15 RX ORDER — OXYCODONE AND ACETAMINOPHEN 5; 325 MG/1; MG/1
1 TABLET ORAL EVERY 4 HOURS PRN
Qty: 42 TABLET | Refills: 0 | Status: SHIPPED | OUTPATIENT
Start: 2021-02-15 | End: 2021-03-16

## 2021-02-15 RX ORDER — CODEINE PHOSPHATE AND GUAIFENESIN 10; 100 MG/5ML; MG/5ML
1-2 SOLUTION ORAL EVERY 4 HOURS PRN
Qty: 180 ML | Refills: 0 | Status: SHIPPED | OUTPATIENT
Start: 2021-02-15 | End: 2021-03-16

## 2021-02-15 NOTE — PATIENT INSTRUCTIONS
Patient Education     Viral Upper Respiratory Illness (Adult)    You have a viral upper respiratory illness (URI), which is another term for the common cold. This illness is contagious during the first few days. It is spread through the air by coughing and sneezing. It may also be spread by direct contact (touching the sick person and then touching your own eyes, nose, or mouth). Frequent handwashing will decrease risk of spread. Most viral illnesses go away within 7 to 10 days with rest and simple home remedies. Sometimes the illness may last for several weeks. Antibiotics will not kill a virus, and they are generally not prescribed for this condition.  Home care    If symptoms are severe, rest at home for the first 2 to 3 days. When you resume activity, don't let yourself get too tired.    Don't smoke. If you need help stopping, talk with your healthcare provider.    Avoid being exposed to cigarette smoke (yours or others ).    You may use acetaminophen or ibuprofen to control pain and fever, unless another medicine was prescribed. If you have chronic liver or kidney disease, have ever had a stomach ulcer or gastrointestinal bleeding, or are taking blood-thinning medicines, talk with your healthcare provider before using these medicines. Aspirin should never be given to anyone under 18 years of age who is ill with a viral infection or fever. It may cause severe liver or brain damage.    Your appetite may be poor, so a light diet is fine. Stay well hydrated by drinking 6 to 8 glasses of fluids per day (water, soft drinks, juices, tea, or soup). Extra fluids will help loosen secretions in the nose and lungs.    Over-the-counter cold medicines will not shorten the length of time you re sick, but they may be helpful for the following symptoms: cough, sore throat, and nasal and sinus congestion. If you take prescription medicines, ask your healthcare provider or pharmacist which over-the-counter medicines are safe to  use. (Note: Don't use decongestants if you have high blood pressure.)  Follow-up care  Follow up with your healthcare provider, or as advised.  When to seek medical advice  Call your healthcare provider right away if any of these occur:    Cough with lots of colored sputum (mucus)    Severe headache; face, neck, or ear pain    Difficulty swallowing due to throat pain    Fever of 100.4 F (38 C) or higher, or as directed by your healthcare provider  Call 911  Call 911 if any of these occur:    Chest pain, shortness of breath, wheezing, or difficulty breathing    Coughing up blood    Very severe pain with swallowing, especially if it goes along with a muffled voice   [employer] last reviewed this educational content on 6/1/2018 2000-2020 [employer] [address] [place] [address], [place] [address]. All rights reserved. This information is not intended as a substitute for professional medical care. Always follow your healthcare professional's instructions.

## 2021-02-15 NOTE — TELEPHONE ENCOUNTER
Shahram communication pasted below    Hi, im all of a sudden having a lot of pain in my right side. I had a slip didn't fall but it's pretty soar like I just had the procedure done.    Hi this is an add on to previous message,  all the meds im on right now are now coming close to helping.  ----------------    Lopez Swift, It is not unexpected to be sore after a fall. If you believe you are acutely injured please have it evaluated. If you do not have an injury it will get better with a little time.  Ice and rest will be helpful as well. Take care.     LETHA Rivas, RN  Care Coordinator  [facility]  ------------    I have been doing all of that,  was just hoping I could get a little more meds to get through this. It's driving me crazy.    Just forget it, Dr lewis told me to reach out if I had any issues and now I feel like I'm been given the third degree. Sorry for wasting your time.  ------------    What are you requesting extra medication for? Because you had a fall?      LETHA Rivas, RN  Care Coordinator  [facility]  ---------------    I didn't fall,  I slipped with a Hickey motion a few days ago which total flared uo where i had my procedure,  the pain is intense.  ---------------    Less I'm sorry the question upset you, You actually did not ask for medication in the first two messages. I had no way of knowing that was what you were asking for and I did not make that assumption.       Its my job to gather information and sometimes that requires asking additional questions. Once you said you were looking for additional medication I needed to know what for so that I could direct the request to the correct person. The more detail you put in your communication the less questions will be needed.      I will forward your request for additional medication for post procedural pain to Dr Gamble to review. One more question. What pharmacy  do you use?      LETHA Rivas, RN  Care Coordinator  [facility]  ----------  [facility] in [place],  juan [address]los [address] I've been on edge because of the pain, didn't mean to lash out.  ---------  Routing request for additional medication to Dr Gamble to review.    LETHA Rivas, RN  Care Coordinator  [facility] [place]

## 2021-02-15 NOTE — PROGRESS NOTES
"Jamison is a 55 year old who is being evaluated via a billable telephone visit.      What phone number would you like to be contacted at?   How would you like to obtain your AVS? MyChart    Assessment & Plan     Cough  Getting chest film to evaluate for pneumonia, rc for Mucinex to help thin his secretions.  No antibiotic for now pending CXR result. Cotntnue ti stay well hydrated, use throat lozenges prn sore throat from coughing,  Use humidifier. Reviewed indications for return to clinic?UC visit.  He states he can come in to the clinic in the am for his chest film.  - XR Chest 2 Views  - guaiFENesin (MUCINEX) 600 MG 12 hr tablet  Dispense: 20 tablet; Refill: 0    Rheumatoid arthritis involving multiple sites, unspecified whether rheumatoid factor present (H)  Followed by [id number]}     BMI:   Estimated body mass index is 34.02 kg/m  as calculated from the following:    Height as of 1/18/21: 1.88 m (6' 2\").    Weight as of 1/18/21: 120.2 kg (265 lb).     See Patient Instructions    No follow-ups on file.    JUANITA Perez CNP  [facility]    Nahun Swift is a 55 year old who presents for the following health issues  HPI       Acute Illness  Acute illness concerns: 1 month, off and on  Onset/Duration: off and on for a month, worse in the last week.  Symptoms:  Fever: no  Chills/Sweats: no  Headache (location?): YES  Sinus Pressure: no  Conjunctivitis:  no  Ear Pain: no  Rhinorrhea: YES-clear  Congestion: no  Sore Throat: YES  Cough: YES-non-productive, worsening over time. Sputum is thick but he cannot expectorate it despite running a humidifier in his house and drinking plenty of water. states he coughs so much he vomits and cannot sleep.  Wheeze: YES- worse on the right side when lying in bed  Decreased Appetite: no  Nausea: YES with coughing  Vomiting: no  Diarrhea: no  Dysuria/Freq.: no  Dysuria or Hematuria: no  Fatigue/Achiness: YES-tired  Sick/Strep Exposure: " no  Therapies tried and outcome: Zithromax, and Robitussin AC- helped a bit but symptoms returned. He was seen by [facility] 2/4/21and told he had URI (no antibiotic given).       RA- has not taken his Methotrexate and Embrel for cici a week due to  URI symptoms.  He had negative COVID-19 PRC 1/29/21 prior toRadiofrequency Ablation L/S facet joint nerve 3 levels on right on 2/1/21.  He has not left the ouse since he had the procedure,declines COVID-19 screen.       How many servings of fruits and vegetables do you eat daily?  2-3    On average, how many sweetened beverages do you drink each day (Examples: soda, juice, sweet tea, etc.  Do NOT count diet or artificially sweetened beverages)?   0    How many days per week do you exercise enough to make your heart beat faster? 3 or less    How many minutes a day do you exercise enough to make your heart beat faster? 9 or less    How many days per week do you miss taking your medication? 0      Review of Systems   Constitutional, HEENT, cardiovascular, pulmonary, gi and gu systems are negative, except as otherwise noted.      Objective           Vitals:  No vitals were obtained today due to virtual visit.    Physical Exam   healthy, alert and mild distress  PSYCH: Alert and oriented times 3; coherent speech, normal   rate and volume, able to articulate logical thoughts, able   to abstract reason, no tangential thoughts, no hallucinations   or delusions  His affect is normal and pleasant  RESP: Frequent cough-loose, no  no audible wheezing, able to talk in full sentences  Remainder of exam unable to be completed due to telephone visits    No results found for this or any previous visit (from the past 24 hour(s)).            Phone call duration: 9 minutes

## 2021-02-15 NOTE — TELEPHONE ENCOUNTER
Spoke with patient.  Agreed to stop oxycodone and use percocet for 1 week, then he is to go back.    Ashlyn Noe MD  [facility]     Signed Prescriptions:                        Disp   Refills    oxyCODONE-acetaminophen (PERCOCET) 5-325 M*42 tab*0        Sig: Take 1 tablet by mouth every 4 hours as needed for           severe pain . Stop oxycodone. Use this for 1           week, then go back to your previous med and           dosing.  Authorizing Provider: MARKIE NOE

## 2021-02-16 ENCOUNTER — VIRTUAL VISIT (OUTPATIENT)
Dept: PALLIATIVE MEDICINE | Facility: CLINIC | Age: 56
End: 2021-02-16
Payer: COMMERCIAL

## 2021-02-16 ENCOUNTER — MYC MEDICAL ADVICE (OUTPATIENT)
Dept: PALLIATIVE MEDICINE | Facility: CLINIC | Age: 56
End: 2021-02-16

## 2021-02-16 DIAGNOSIS — M45.7 ANKYLOSING SPONDYLITIS OF LUMBOSACRAL REGION (H): ICD-10-CM

## 2021-02-16 DIAGNOSIS — M51.369 DDD (DEGENERATIVE DISC DISEASE), LUMBAR: ICD-10-CM

## 2021-02-16 DIAGNOSIS — M06.9 RHEUMATOID ARTHRITIS (H): ICD-10-CM

## 2021-02-16 DIAGNOSIS — M79.18 MYOFASCIAL PAIN: ICD-10-CM

## 2021-02-16 DIAGNOSIS — M62.838 MUSCLE SPASM: ICD-10-CM

## 2021-02-16 DIAGNOSIS — M25.562 CHRONIC PAIN OF BOTH KNEES: ICD-10-CM

## 2021-02-16 DIAGNOSIS — G89.29 CHRONIC PAIN OF BOTH KNEES: ICD-10-CM

## 2021-02-16 DIAGNOSIS — M47.816 SPONDYLOSIS OF LUMBAR REGION WITHOUT MYELOPATHY OR RADICULOPATHY: ICD-10-CM

## 2021-02-16 DIAGNOSIS — M54.59 LUMBAR FACET JOINT PAIN: Primary | ICD-10-CM

## 2021-02-16 DIAGNOSIS — G89.4 CHRONIC PAIN SYNDROME: ICD-10-CM

## 2021-02-16 DIAGNOSIS — M25.561 CHRONIC PAIN OF BOTH KNEES: ICD-10-CM

## 2021-02-16 PROCEDURE — 99214 OFFICE O/P EST MOD 30 MIN: CPT | Mod: 95 | Performed by: NURSE PRACTITIONER

## 2021-02-16 RX ORDER — CHLORZOXAZONE 500 MG/1
500 TABLET ORAL 3 TIMES DAILY PRN
Qty: 45 TABLET | Refills: 1 | Status: SHIPPED | OUTPATIENT
Start: 2021-02-16 | End: 2021-11-30

## 2021-02-16 RX ORDER — OXYCODONE HCL 10 MG/1
10 TABLET, FILM COATED, EXTENDED RELEASE ORAL EVERY 12 HOURS
Qty: 60 TABLET | Refills: 0 | Status: ON HOLD | OUTPATIENT
Start: 2021-02-16 | End: 2021-03-26

## 2021-02-16 ASSESSMENT — PAIN SCALES - GENERAL: PAINLEVEL: EXTREME PAIN (8)

## 2021-02-16 NOTE — PATIENT INSTRUCTIONS
Plan:    1. Recommended to increase activity while pacing himself  2. Physical Therapy:  Has referral to PHYSICAL THERAPY  3. Clinical Health Psychologist: working regularly with Jonna Farrell  4. Diagnostic Studies:  None  5. Medication Management:    1. Continue OxyContin 10mg every 12 hours,  fill 3/2, start 3/4  2.  continue oxycodone 5mg tabs of 3/day   3. Start chlorzoxazone 500mg three times daily as needed for muscle spasms  6. Further procedures recommended: none at present  7. Recommendations to PCP: see above  8. Follow up: 8 weeks in-person visit. Please call [phone] to make your follow-up appointment with me.       ----------------------------------------------------------------  Clinic Number:  [phone]     Call with any questions about your care and for scheduling assistance.     Calls are returned Monday through Friday between 8 AM and 4:30 PM. We usually get back to you within 2 business days depending on the issue/request.    If we are prescribing your medications:    For opioid medication refills, call the clinic or send a [employer] message 7 days in advance.  Please include:    Name of requested medication    Name of the pharmacy.    For non-opioid medications, call your pharmacy directly to request a refill. Please allow 3-4 days to be processed.     Per [place] State Law:    All controlled substance prescriptions must be filled within 30 days of being written.      For those controlled substances allowing refills, pickup must occur within 30 days of last fill.      We believe regular attendance is key to your success in our program!      Any time you are unable to keep your appointment we ask that you call us at least 24 hours in advance to cancel.This will allow us to offer the appointment time to another patient.     Multiple missed appointments may lead to dismissal from the clinic.

## 2021-02-16 NOTE — PROGRESS NOTES
Jamison is a 55 year old who is being evaluated via a billable video visit.      How would you like to obtain your AVS? MyChart  If the video visit is dropped, the invitation should be resent by: [phone]  Will anyone else be joining your video visit? Charley Krueger MA          Video-Visit Details    Type of service:  Video Visit  Video Start Time: 9:47 AM  Video End Time:10:14 AM    Originating Location (pt. Location): Home    Distant Location (provider location):  [facility]     Platform used for Video Visit: [facility]    2/16/2021        Chief complaint:  - lower  back pain.   -Bilateral knee pain  -hand pain      Interval history:   Jailene Breen is a 55 year old male is known to me for   Lumbar spondylosis, pain is worse with extension and rotation indicating a facetogenic component to pain  Lumbar degenerative disc disease  SI joint pain  Ankylosing spondylitis  Myofascial pain  Chronic pain syndrome  PMHx includes: Panic attacks, back pain, arthritis, anxiety, ankylosing spondylitis  PSHx includes: Right knee surgery ×2, left foot surgery right knee arthroscopy        Recommendations/plan at the last visit on 11/11/2020 included:  1. Recommended to increase activity while pacing himself  2. Physical Therapy:  Has referral to PHYSICAL THERAPY  3. Clinical Health Psychologist: working regularly with Jonna Farrell  4. Diagnostic Studies:  None  5. Medication Management:    1. Continue OxyContin 10mg BID fill 12/1, start 12/4  2. Continue Flexeril 5-10mg TID PRN  3.  continue oxycodone 5mg tabs of 3/day fill 12/8, start 12/10  6. Further procedures recommended: working toward bilateral lumbar radiofrequency ablation, had LMBB #2 on the left side earlier today.   7. Recommendations to PCP: see above  8. Follow up: 10-12 weeks video visit due to COVID-19 threat  9. States he will be seeing urology in [place] in the near future re:  recent issues with severe urinary retention requiring catheterization          Since his last visit, Jailene Breen reports:    Interval history February 16, 2021  -He has had more pain since the right  RFA done 2/1/2021.  -he slipped taking his garbage out last week, he tweaked his back, did not fall.    -he is off of his RA meds as he has an upper respiratory infection  -the extreme cold makes his arthritis pain worse  -he may be having a surgery to fix a hiatal hernia  -flexeril caused urinary retention, this was better when he stopped taking it.       Interval history 11/11/2020  -he has been having issues with emptying his bladder and he has had 2 Carrasco catheters in the near future  -he is trying to get in with a [facility].  -we discussed how opiate medication can cause urinary hesitancy  -he feels like the urinary symptoms began a couple of weeks after he began lexapro.  -he states he recently found out that his biological dad has had issues with his bladder.   -he had 2nd lumbar medial branch block done today with Dr. Ashlyn Gamble, we are working toward lumbar RFA..       Interval history 9/25/2020  -He has been sick 10 days ago,  had been on antibiotics and was not able to take his RA meds. He then felt better and then is now feeling worse again. Had been tested for COVID-19  -how his whole house is not feeling well.   -he is off of his RA meds, and so his pain is worse because he doesn't feel well, he has a really deep cough.  -having 2nd LMBB next week on the left side, then plan to do bilateral lumbar RFA  -he can't sleep due to body aches and then his back pain is worse.   -recently seen by psychiatry for first time this week, placed on escitalopram for depression/anxiety and hydroxyzine for panic/anxiety PRN, Les tells me that the psychiatrist wants him to stop using the clonazepam.       Interval history 6/10/2020  -having bilateral low back pain that radiates into the groin at  "times  -having multiple joint pain from RA, his RA flare is better as he is back on the Enbrel now  -he would love to get a new bed as he has issues getting comfortable in bed.   -he is having more left sided low back pain, worse with lumbar extension and extension and rotation. He is interested in pursing possible left sided lumbar RFA.      Interval 2020  -his 28 year old niece overdosed and  last Saturday, she had a 12,5 and 1 year old.   -he is going to her  today  -he did get his Enbrel yesterday  -he is going to see a new rheumatologist in July with the [facility]   -he says his pain is now a bit better  -he has multiple joint pain from RA and chronic low back pain        At this point, the patient's participation with our multidisciplinary team includes:  The patient has been compliant with the program.    PT - has seen Cyndee White, none recently  Health Psych - worked with  Padilla Keene, last on 2018.  Working with Jonna Farrell PhD and been seen >8 times since 2020         Pain scores:    Pain intensity on average is 8 on a scale of 0-10.    Range is 5-10/10.   Pain right now is 7/10.   Pain is described as \"shooting, throbbing, penetrating, burning, and stabbing.\"  Pain is constant in nature    Current pain relevant medications:      -nortriptyline 50mg at bedtime (helpful)  -Enbrel 50mg/mL   -ibuprofen 800mg TID PRN (using 3 times daily-helpful)  -Tylenol 500mg using 1000mg TID (unsure if helpful)  -Maxalt 10mg PRN migraine (helpful for migraine--he does have visual aura)  -OxyContin 10mg BID (helpful)  -naloxone nasal spray PRN for accidental opiate overdose        Other pertinent medications:   -clonazepam 1mg tab, may take 0.5-1mg BID PRN anxiety (helpful, has been using infrequently)  -Escitalopram 10mg (just started this with psychiatry 3 days ago)  -hydroxyzine 25mg once daily as needed for anxiety/panic (just prescribed by psychiatry 3 days ago)      Previous " Medications:   OPIATES: Oxycodone (helpful), Fentanyl (100mcg/hr patch helpful), hydrocodone (itching), Tramadol (not helpful)   NSAIDS: ibuprofen (not helpful), Aleve (not helpful)  MUSCLE RELAXANTS: methocarbamol (somewhat helpful), Flexeril (somewhat helpful but caused severe urinary retention requiring catheterization), tizanidine (unsure if helpful), metaxalone (unsure), SOMA (helpful)  ANTI-MIGRAINE MEDS: Maxalt (helpful for migraine), Imitrex injection (somewhat helpful)  ANTI-DEPRESSANTS: Prozac (helpful), Cymbalta (felt weird on it), nortriptyline (unsure if helpful), Buspar (unsure), Remeron (blacked out), bupropion (not helpful for smoking cessation)  SLEEP AIDS: Ambien (helpful)  ANTI-CONVULSANTS: gabapentin (felt odd on this medication, unsure of dose), Lyrica (not helpful for 4 months, unsure of dose), Topamax (not helpful, he felt weird on it)   TOPICALS: Lidocaine patches (not helpful), Voltaren gel (not helpful)  Other meds: Tylenol (unsure if helpful)        Other treatments have included:   Jailene Breen has been seen at a pain clinic in the past.  [facility]  PT: tried, not helpful  Chiropractic: tried, not helpful  Acupuncture: none  TENs Unit: tried, helpful     Injections:     -has had injections at outside clinics  -has had epidural injections for low back pain (one was helpful)  -he has had lumbar medial branch blocks at [facility] and he was going to have lumbar radiofrequency ablation (did not do this due to cost)  -4/3/2017 right LMBBs with Dr. Ashlyn Gamble (helpful)  -4/26/2017 right LMBBs with Dr. Ashlyn Gamble (helpful)  -5/31/2017 right L3, L4, L5 RFA with Dr. Ashlyn Gamble (good relief for over 6 months)  -3/15/2018 right L5 transforaminal MAKAYLA with Dr. Ashlyn Gamble (not helpful)  -5/10/2018 trigger point injections with Dr. Ashlyn Gamble(somewhat helpful).  -7/12/2018 Right L3, L4, L5 RFA with Dr. Ashlyn Gamble (helpful).  -9/20/2018 Left piriformis injections,  bilateral gluteal trigger point with Dr. Gamble (helpful).  -1/31/2019 right L2-3, L3-4 and L4-5 facet joint injections with Dr. Ashlyn Gamble (somewhat helpful)  4/4/2019  right L3, L4, L5/sacral ala lumbar radiofrequency ablation with Dr. Gamble (helpful over right side)  6/28/2019 Bilateral facet joint injections at l4-5, L5-S1 with Dr. Holley (only helpful for 7 days)  -2/12/2020 right V2-J2-V1-sacral ALA RFA done with Dr. Ashlyn Gamble (helpful)  -8/26/2020 left L3-5 lumbar medial branch blocks #1 with Dr. Ashlyn Gamble (very helpful)  -11/11/2020 left L3-5 lumbar medial branch blocks #2 with Dr. Ashlyn Gamble (too soon to tell, done today helpful)   -2/1/2021 right lumbar RFA L4-5 and L5-S1 with Dr. Ashlyn Gamble   (too early to tell)          THE 4 A's OF OPIOID MAINTENANCE ANALGESIA    Analgesia: opiates are helpful    Activity: activity impeded without opiate    Adverse effects: none    Adherence to Rx protocol: yes          Side Effects: no side effect  Patient is using the medication as prescribed: YES    Medications:  Current Outpatient Medications   Medication Sig Dispense Refill     atenolol (TENORMIN) 25 MG tablet Take 25 mg by mouth daily       benzonatate (TESSALON) 200 MG capsule Take 1 capsule (200 mg) by mouth 3 times daily as needed for cough 20 capsule 0     clonazePAM (KLONOPIN) 1 MG tablet TAKE ONE-HALF - 1 TABLET BY MOUTH ONCE DAILY AS NEEDED FOR ANXIETY 30 tablet 0     escitalopram (LEXAPRO) 10 MG tablet Take 2 tablets (20 mg) by mouth daily 60 tablet 2     etanercept (ENBREL SURECLICK) 50 MG/ML autoinjector Inject 50 mg Subcutaneous once a week 1 kit 2     folic acid (FOLVITE) 1 MG tablet Take 3 tablets (3 mg) by mouth daily 180 tablet 3     guaiFENesin-codeine (ROBITUSSIN AC) 100-10 MG/5ML solution Take 5-10 mLs by mouth every 4 hours as needed for cough 180 mL 0     hydrOXYzine (ATARAX) 25 MG tablet Take 1 tablet (25 mg) by mouth daily as needed (for panic and severe anxiety) 30  tablet 2     methotrexate 2.5 MG tablet Take 4 tablets (10 mg) by mouth every 7 days Labs every 8 - 12 weeks for refills. Due now. 48 tablet 0     naloxone (NARCAN) 4 MG/0.1ML nasal spray Spray 1 spray (4 mg) into one nostril alternating nostrils as needed for opioid reversal every 2-3 minutes until assistance arrives 0.2 mL 0     nicotine polacrilex (NICORETTE) 4 MG gum Place 1 each (4 mg) inside cheek as needed for smoking cessation 240 each 6     omeprazole (PRILOSEC) 20 MG DR capsule Take 1 capsule (20 mg) by mouth 2 times daily 84 capsule 0     oxyCODONE (OXYCONTIN) 10 MG 12 hr tablet Take 1 tablet (10 mg) by mouth every 12 hours Fill 1/31/2021. Begin 2/2/2021 60 tablet 0     oxyCODONE (ROXICODONE) 5 MG tablet Take 1 tablet (5 mg) by mouth every 6 hours as needed for severe pain Max of 3 tabs/day.  Okay to dispense 2/6/2021 and start 2/8/2021 90 tablet 0     oxyCODONE-acetaminophen (PERCOCET) 5-325 MG tablet Take 1 tablet by mouth every 4 hours as needed for severe pain . Stop oxycodone. Use this for 1 week, then go back to your previous med and dosing. 42 tablet 0     oxyCODONE-acetaminophen (PERCOCET) 5-325 MG tablet Take 1 tablet by mouth every 4 hours as needed for severe pain (post procedure pain) Stop oxycodone while taking this new dose for chronic and post-procedure pain 60 tablet 0     IBUPROFEN PO Take 800 mg by mouth every 6 hours as needed for moderate pain Reported on 5/19/2017         Medical History: any changes in medical history since they were last seen? No    Social History:    Home situation: , has 2 grown children  Occupation/Schooling: currently unemployed, worked as a  (unemployed since 3/1/2017). 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  Uses nicotine gum  If yes, was cessation counseling offered?  None  needed        Review of Systems:    The 14 system ROS was reviewed with patient and is positive for:  Constitutional: fever/chills, fatigue, weight gain, weight loss  Eyes/Head: headache, dizziness  ENT: ringing in ears (in the left ear, at baseline)  Allergy/Immune: allergies  Skin: itching, rash, hives  Hematologic: easy bruising  Respiratory: cough, wheezing, shortness of breath  Cardiovascular: swelling in feet, fainting, palpitations, chest pain  GI: abdominal pain, nausea, vomiting, diarrhea, constipation  Endocrine: steroid use  Musculoskeletal:  joint pain, arthritis, stiffness, gout, back pain, neck pain  Urinary: frequency, urgency, incontinence, hesitancy  Neurologic: weakness, numbness/tingling, seizure, stroke, memory loss  Mental health: depression, anxiety, stress, suicidal ideation          Physical Exam:     Vital signs: There were no vitals taken for this visit.     Behavioral observations:  Awake, alert. Cooperative.   Pulm: respirations easy and unlabored. Able to speak in full sentences without SOB or cough noted.                [place] Prescription Monitoring Program:  Reviewed [place] [facility] 2/16/2021- no concerning fills.  Susana GRANT RN CNP, FNP  [facility]        DIRE Score for selecting candidates for long term opioid analgesia for chronic pain:  Diagnosis  2  Intractablility  2  Risk    Psychological health  2    Chemical health  2    Reliability  2    Social support  2  Efficacy  2    Total DIRE Score = 14. Note that  7-13 predicts poor outcome (compliance and efficacy) from opioid prescribing; 14-21 predicts good outcome (compliance and efficacy)  from opioid prescribing.      Assessment:    1. Lumbar facet joint pain Left >> right   2. DDD, lumbar  3. Lumbosacral spondylosis without myelopathy or radiculopathy  4. Ankylosing spondylitis of lumbosacral region  5. Chronic pain of both knees  6. Myofascial pain  7. Muscle spasms  8. Chronic pain  syndrome    9. RA involving multiple joints  10. Encounter for long-term opiate analgesic use  11. PMHx includes: Panic attacks, back pain, arthritis, anxiety, ankylosing spondylitis  12. PSHx includes: Right knee surgery ×2, left foot surgery right knee arthroscopy      Plan:    1. Recommended to increase activity while pacing himself  2. Physical Therapy:  Has referral to PHYSICAL THERAPY  3. Clinical Health Psychologist: working regularly with Jonna Andrewl  4. Diagnostic Studies:  None  5. Medication Management:    1. Continue OxyContin 10mg every 12 hours,  fill 3/2, start 3/4  2.  continue oxycodone 5mg tabs of 3/day   3. Start chlorzoxazone 500mg three times daily as needed for muscle spasms  6. Further procedures recommended: none at present  7. Recommendations to PCP: see above  8. Follow up: 8 weeks in-person visit. Please call [phone] to make your follow-up appointment with me.         Video visit using [facility] lasted 29 minutes    BILLING TIME DOCUMENTATION:   The total TIME spent on this patient on the day of the appointment was 39 minutes.      TOTAL TIME includes:   Time spent preparing to see the patient: 4 minutes (reviewing records and tests)  Time spend face to face with the patient: 29 minutes via video due to COVID-19 viral threat.   Time spent ordering tests, medications, procedures and referrals: 0 minutes  Time spent Referring and communicating with other healthcare professionals: 0 minutes  Documenting clinical information in Epic: 6 minutes      Susana GRANT RN CNP, FNP  [facility]  [facility]

## 2021-02-17 ENCOUNTER — VIRTUAL VISIT (OUTPATIENT)
Dept: SURGERY | Facility: CLINIC | Age: 56
End: 2021-02-17
Payer: COMMERCIAL

## 2021-02-17 DIAGNOSIS — I10 ESSENTIAL HYPERTENSION: Primary | ICD-10-CM

## 2021-02-17 DIAGNOSIS — K21.00 HIATAL HERNIA WITH GERD AND ESOPHAGITIS: Primary | ICD-10-CM

## 2021-02-17 DIAGNOSIS — K44.9 HIATAL HERNIA WITH GERD AND ESOPHAGITIS: Primary | ICD-10-CM

## 2021-02-17 PROCEDURE — 99213 OFFICE O/P EST LOW 20 MIN: CPT | Mod: 95 | Performed by: SURGERY

## 2021-02-17 NOTE — PATIENT INSTRUCTIONS
[facility] pH and manometry testing  If you do not hear from them to schedule, call [phone] option 2  Once I have the results of the tests back we will set up a follow up appointment to come up with the surgical plan

## 2021-02-17 NOTE — LETTER
"    2/17/2021         RE: Jailene Breen  [address]   [place] [address]        Dear Colleague,    Thank you for referring your patient, Jailene Breen, to the [facility]. Please see a copy of my visit note below.    Jamison is a 55 year old who is being evaluated via a billable telephone visit.      What phone number would you like to be contacted at? [phone]   How would you like to obtain your AVS? Mail a copy    Assessment & Plan   Problem List Items Addressed This Visit     None      Visit Diagnoses     Hiatal hernia with GERD and esophagitis    -  Primary    Relevant Orders    GASTROENTEROLOGY ADULT REF PROCEDURE ONLY    GASTROENTEROLOGY ADULT REF PROCEDURE ONLY      Know that we were able to complete his EGD and confirm good size hiatal hernia measured by me about 5 cm and associated with LA grade a esophagitis I have started him on omeprazole.  I would like him to continue this to help heal up the esophagitis and it seems to be controlling his heartburn symptoms.  Next step would be proceeding with pH testing and manometry to complete his preoperative work-up.  The repeat scope from the Bravo testing would also allow checking to see if the esophagitis has healed.  Once these are completed I will follow-up with him to come up with our surgical plan  I did discuss that it is difficult to predict if surgical repair of the hiatal hernia will help these respiratory symptoms or not, though he has had a pulmonary workup in the past.  He understands and still interested in repair.    Review of the result(s) of each unique test - EGD       BMI:   Estimated body mass index is 34.02 kg/m  as calculated from the following:    Height as of 1/18/21: 1.88 m (6' 2\").    Weight as of 1/18/21: 120.2 kg (265 lb).       Subjective     HPI       Started on omeprazole after the EGD. After about 2-3 days of being on that has not noted any heartburn symptoms at all.  No change in his " respiratory symptoms/shortness of breath  Still interested in surgical repair of the hiatal hernia    EGD 1/19/21  Impression:               - LA Grade A reflux esophagitis. Rule out Shaikh's                             esophagus. Biopsied.                             - Z-line regular, 40 cm from the incisors.                             - 5 cm type-III paraesophageal hernia.                             - Gastroesophageal flap valve classified as Hill                             Grade IV (no fold, wide open lumen, hiatal hernia                             present).                             - Gastritis. Biopsied.                             - Normal examined duodenum.     Brandon Mack MD  [facility]    Phone call duration: 11 minutes      Again, thank you for allowing me to participate in the care of your patient.        Sincerely,        Brandon Mack MD

## 2021-02-17 NOTE — PROGRESS NOTES
"Jamison is a 55 year old who is being evaluated via a billable telephone visit.      What phone number would you like to be contacted at? [phone]   How would you like to obtain your AVS? Mail a copy    Assessment & Plan   Problem List Items Addressed This Visit     None      Visit Diagnoses     Hiatal hernia with GERD and esophagitis    -  Primary    Relevant Orders    GASTROENTEROLOGY ADULT REF PROCEDURE ONLY    GASTROENTEROLOGY ADULT REF PROCEDURE ONLY      Know that we were able to complete his EGD and confirm good size hiatal hernia measured by me about 5 cm and associated with LA grade a esophagitis I have started him on omeprazole.  I would like him to continue this to help heal up the esophagitis and it seems to be controlling his heartburn symptoms.  Next step would be proceeding with pH testing and manometry to complete his preoperative work-up.  The repeat scope from the Bravo testing would also allow checking to see if the esophagitis has healed.  Once these are completed I will follow-up with him to come up with our surgical plan  I did discuss that it is difficult to predict if surgical repair of the hiatal hernia will help these respiratory symptoms or not, though he has had a pulmonary workup in the past.  He understands and still interested in repair.    Review of the result(s) of each unique test - EGD       BMI:   Estimated body mass index is 34.02 kg/m  as calculated from the following:    Height as of 1/18/21: 1.88 m (6' 2\").    Weight as of 1/18/21: 120.2 kg (265 lb).       Subjective     HPI       Started on omeprazole after the EGD. After about 2-3 days of being on that has not noted any heartburn symptoms at all.  No change in his respiratory symptoms/shortness of breath  Still interested in surgical repair of the hiatal hernia    EGD 1/19/21  Impression:               - LA Grade A reflux esophagitis. Rule out Shaikh's                             esophagus. Biopsied.                         "     - Z-line regular, 40 cm from the incisors.                             - 5 cm type-III paraesophageal hernia.                             - Gastroesophageal flap valve classified as Hill                             Grade IV (no fold, wide open lumen, hiatal hernia                             present).                             - Gastritis. Biopsied.                             - Normal examined duodenum.     Brandon Mack MD  [facility]    Phone call duration: 11 minutes

## 2021-02-17 NOTE — TELEPHONE ENCOUNTER
Routing refill request to provider for review/approval because:  Medication is reported/historical    Katalina Horton RN, BSN, CMSRN  [facility]

## 2021-02-18 ENCOUNTER — MYC MEDICAL ADVICE (OUTPATIENT)
Dept: FAMILY MEDICINE | Facility: CLINIC | Age: 56
End: 2021-02-18

## 2021-02-18 ENCOUNTER — TELEPHONE (OUTPATIENT)
Dept: GASTROENTEROLOGY | Facility: CLINIC | Age: 56
End: 2021-02-18

## 2021-02-18 RX ORDER — ATENOLOL 25 MG/1
25 TABLET ORAL DAILY
Qty: 90 TABLET | Refills: 1 | Status: SHIPPED | OUTPATIENT
Start: 2021-02-18 | End: 2021-09-14

## 2021-02-18 NOTE — TELEPHONE ENCOUNTER
Left message for patient to call back to schedule esophageal manometry and EGD w/ Bravo.     Procedure: Esophageal Workup    Esophageal Workup: Manometry    Esophageal Workup Reason for Referral: hiatal hernia, gerd, preop    Preferred Location: [facility]    Scheduling Instructions: If you have not heard from the scheduling office within 2 business days, please call [phone].      Dx: Hiatal hernia with GERD and esophagitis [K44.9, K21.00]    Procedure: Esophageal Workup    Esophageal Workup: EGD with Bravo    Esophageal Workup Reason for Referral: hiatal hernia, gerd, preop    Preferred Location: [facility]    Scheduling Instructions: If you have not heard from the scheduling office within 2 business days, please call [phone].      Dx: Hiatal hernia with GERD and esophagitis [K44.9, K21.00]

## 2021-02-18 NOTE — TELEPHONE ENCOUNTER
Sent to pharmacy Etanercept 50 mg autoinjector pen. Dispense 1 kit which includes 4 autoinjectors per kit (50 mg per autoinjector) administer subcutaneously once a week. Refills of 2.

## 2021-02-19 ENCOUNTER — TELEPHONE (OUTPATIENT)
Dept: GASTROENTEROLOGY | Facility: CLINIC | Age: 56
End: 2021-02-19

## 2021-02-19 NOTE — TELEPHONE ENCOUNTER
Patient is scheduled for MANOMETRY & EGD W/ BRAVO with Dr. HORNE    Spoke with: LES    Date of Procedure: 05/26 & 05/27    Location: [facility]    Sedation Type CS    Pre-op for Unit J MAC and OR NOT NEEDED    (if yes advise patient they will need a pre-op prior to procedure)      Is patient on blood thinners? -NO (If yes- inform patient to follow up with PCP or provider for follow up instructions)     Informed patient they will need an adult  YES    Informed Patient of COVID Test Requirement YES    Preferred Pharmacy for Pre Prescription NA    Confirmed Nurse will call to complete assessment YES    Additional comments: NA

## 2021-02-25 ENCOUNTER — VIRTUAL VISIT (OUTPATIENT)
Dept: PSYCHIATRY | Facility: CLINIC | Age: 56
End: 2021-02-25
Attending: PSYCHIATRY & NEUROLOGY
Payer: COMMERCIAL

## 2021-02-25 DIAGNOSIS — F33.1 MAJOR DEPRESSIVE DISORDER, RECURRENT EPISODE, MODERATE (H): ICD-10-CM

## 2021-02-25 DIAGNOSIS — F41.1 GAD (GENERALIZED ANXIETY DISORDER): ICD-10-CM

## 2021-02-25 PROCEDURE — 99214 OFFICE O/P EST MOD 30 MIN: CPT | Mod: U7 | Performed by: STUDENT IN AN ORGANIZED HEALTH CARE EDUCATION/TRAINING PROGRAM

## 2021-02-25 RX ORDER — TRAZODONE HYDROCHLORIDE 50 MG/1
25 TABLET, FILM COATED ORAL AT BEDTIME
Qty: 30 TABLET | Refills: 1 | Status: SHIPPED | OUTPATIENT
Start: 2021-02-25 | End: 2021-05-28

## 2021-02-25 RX ORDER — ESCITALOPRAM OXALATE 10 MG/1
20 TABLET ORAL DAILY
Qty: 60 TABLET | Refills: 2 | Status: ON HOLD | OUTPATIENT
Start: 2021-02-25 | End: 2021-03-24

## 2021-02-25 ASSESSMENT — PAIN SCALES - GENERAL: PAINLEVEL: EXTREME PAIN (9)

## 2021-02-25 NOTE — PROGRESS NOTES
"Video- Visit Details  Type of service:  video visit for medication management  Time of service:    Date:  02/25/2021    Video Start Time:  9:58 AM        Video End Time:  11:10 AM    Reason for video visit:  Patient unable to travel due to Covid-19  Originating Site (patient location):  [facility] [place]   Location- Patient's home  Distant Site (provider location):  Remote location  Mode of Communication:  Video Conference via AmWell  Consent:  Patient has given verbal consent for video visit?: Yes        [facility]  Psychiatry Clinic  PSYCHIATRIC PROGRESS NOTE     CARE TEAM:    PCP- Aiden Resendez   Rheumatology- Raghu Parada MD  [facility]- Susana Pike APRN; Santa Farrell PsyD    Jailenestephenie Breen is a 55 year old patient who prefers the name Les and uses pronouns he, him, his.     PERTINENT BACKGROUND        [most recent eval 09/17/20]     Psych critical item history includes suicidal ideation, SIB [cutting], mutiple psychotropic trials , severe med reaction, psych hosp (<3), SUBSTANCE USE: alcohol, substance use treatment  and Major Medical Problems (Rheumatoid Arthritis and Ankylosing Spondylitis)    INTERIM HISTORY      [4, 4]   The patient reports good treatment adherence.  History was provided by the patient who was a good historian.     Since the last visit:   - Patient messaged clinic and when followed up with via phone reported increased depression and suicidal ideation, without plan or intent, requesting to be seen prior to next previously scheduled appointment.   - \"It's been rough. It's been really rough.\" \"Don't want to do anything.\" \"I have this big whole in my gut of worthlessness and hopelessness.\" \"I haven't been able to focus on my work at school.\" Doesn't get around to it and \"when I do it's always a challenge.\" \"I just have no interest to do anything.\" Previously engaged with art and found this very enjoyable. However, now one " "of classes is photography and \"can't even get my camera up.\"  - Some of current mood is \"situational\" specifically, \"I used to be the breadwinner, and provide and now I can't do any of that.\"  - \"I just wish I wouldn't wake up. Don't get me wrong I don't want to kill myself and I'm not suicidal but I do think that some other people's lives would be better without me.\" Stated he had no suicidal plan or intent. Does not have access to firearms. Cites family as reason to live. \"Sometimes I can have those glimpses (of hope).\"  - \"One thing I wont ever do is go to the hospital again. If I'm suicidal.\" Previously presented to ED (years prior) and \"made me go to the psych [facility] and that made things worse.\" When talking with him further he did identify safety plan to contact clinic by phone or after-hours line if symptoms worsened. He also believes he would listen to his wife/family if they felt he needed to be hospitalized.   - Has previously trialed trazodone but had increased sedation the next day.    - Reports anxiety has been less controlled. Hydroxyzine \"doesn't really help when things get bad.\" Also, clonazepam \"used to help more\", but now less.   - Pain medications have also been \"less effective\" but has not wanted to ask for dose increase \"because I don't want to be seen as drug seeking.\"    RECENT PSYCH ROS:   Depression: depressed mood, anhedonia, low energy, poor concentration /memory, feeling worthless, excessive guilt, feeling hopeless, feeling trapped and overwhelmed  Elevated:  none  Psychosis:  none  Anxiety:  as above  Trauma Related:  none  Dysregulation:  none  Eating Disorder: no  Other: no    Adverse Effects:  denies  Pertinent Negative Symptoms: No suicidal ideation,  violent ideation, aggression, psychosis, hallucinations, delusions, otto and hypomania    RECENT SUBSTANCE USE:     Alcohol- none since 2004, used to attend AA meetings prior to COVID.  Tobacco- denies  Caffeine- yes  Cannabis- denies   " "  Opioids- as prescribed           Narcan Kit- prescribed   Other illicit drugs- none    FAMILY and SOCIAL HISTORY   [1ea, 1ea]           [per patient report]            FAMILY HX:  Mom - depression (since  when sister )    SOCIAL HX:  Financial/ Work- currently attending college at [employer], is hoping to start school at [employer] for a degree in psychology after being unable to work due to diagnosis of rheumatoid arthritis and ankylosing spondylitis  Partner/ -  in .  Children- 23 and 25 year old kids      Living situation- [place], house with wife and 2 kids      Social/ Spiritual Support- Talks to  every two weeks for lunch, but \"doesn't really have friends\". Family is supportive.     Legal- yes, history of DUI.    PSYCH and SUBSTANCE USE Critical Summary Points since  - started escitalopram  November - increased escitalopram 20mg daily  February - stopped hydroxyzine; started trazodone 25mg at bedtime and 12.5mg daily as needed for anxiety    MEDICAL HISTORY  and ALLERGY     ALLERGIES: Mirtazapine, Hydrocodone, Ms contin [morphine], and Remeron soltab     Patient Active Problem List   Diagnosis     CARDIOVASCULAR SCREENING; LDL GOAL LESS THAN 160     Anxiety     Overweight (BMI 25.0-29.9)     Back pain     Tear meniscus knee, right, initial encounter     Rheumatoid arthritis involving multiple sites, unspecified rheumatoid factor presence     Chronic pain     Right-sided thoracic back pain, unspecified chronicity     JEFF (generalized anxiety disorder)     Panic attack     Syncope, unspecified syncope type     Ankylosing spondylitis (H)     Tobacco use disorder     Moderate major depression (H)     Immunocompromised (H)     Essential hypertension         MEDICAL REVIEW OF SYSTEMS    [2, 10]   Contraception- none    A comprehensive review of systems was performed and is negative other than noted in the HPI.    MEDICATIONS          Current Outpatient " Medications   Medication Sig Dispense Refill     atenolol (TENORMIN) 25 MG tablet Take 1 tablet (25 mg) by mouth daily 90 tablet 1     benzonatate (TESSALON) 200 MG capsule Take 1 capsule (200 mg) by mouth 3 times daily as needed for cough 20 capsule 0     chlorzoxazone (PARAFON FORTE) 500 MG tablet Take 1 tablet (500 mg) by mouth 3 times daily as needed for muscle spasms 45 tablet 1     clonazePAM (KLONOPIN) 1 MG tablet TAKE ONE-HALF - 1 TABLET BY MOUTH ONCE DAILY AS NEEDED FOR ANXIETY 30 tablet 0     escitalopram (LEXAPRO) 10 MG tablet Take 2 tablets (20 mg) by mouth daily 60 tablet 2     etanercept (ENBREL SURECLICK) 50 MG/ML autoinjector Inject 50 mg Subcutaneous once a week 50 mg 2     folic acid (FOLVITE) 1 MG tablet Take 3 tablets (3 mg) by mouth daily 180 tablet 3     guaiFENesin-codeine (ROBITUSSIN AC) 100-10 MG/5ML solution Take 5-10 mLs by mouth every 4 hours as needed for cough 180 mL 0     hydrOXYzine (ATARAX) 25 MG tablet Take 1 tablet (25 mg) by mouth daily as needed (for panic and severe anxiety) 30 tablet 2     IBUPROFEN PO Take 800 mg by mouth every 6 hours as needed for moderate pain Reported on 5/19/2017       methotrexate 2.5 MG tablet Take 4 tablets (10 mg) by mouth every 7 days Labs every 8 - 12 weeks for refills. Due now. 48 tablet 0     naloxone (NARCAN) 4 MG/0.1ML nasal spray Spray 1 spray (4 mg) into one nostril alternating nostrils as needed for opioid reversal every 2-3 minutes until assistance arrives 0.2 mL 0     nicotine polacrilex (NICORETTE) 4 MG gum Place 1 each (4 mg) inside cheek as needed for smoking cessation 240 each 6     omeprazole (PRILOSEC) 20 MG DR capsule Take 1 capsule (20 mg) by mouth 2 times daily 84 capsule 0     oxyCODONE (OXYCONTIN) 10 MG 12 hr tablet Take 1 tablet (10 mg) by mouth every 12 hours Fill 3/2/2021. Begin 3/4/2021 60 tablet 0     oxyCODONE (ROXICODONE) 5 MG tablet Take 1 tablet (5 mg) by mouth every 6 hours as needed for severe pain Max of 3 tabs/day.   Okay to dispense 2/6/2021 and start 2/8/2021 90 tablet 0     oxyCODONE-acetaminophen (PERCOCET) 5-325 MG tablet Take 1 tablet by mouth every 4 hours as needed for severe pain . Stop oxycodone. Use this for 1 week, then go back to your previous med and dosing. 42 tablet 0     VITALS                     [3, 3]   There were no vitals taken for this visit.   MENTAL STATUS EXAM     [9, 14 cog gs]     Alertness: alert  and oriented  Appearance: adequately groomed  Behavior/Demeanor: cooperative, with good  eye contact   Speech: regular rate and rhythm  Language: intact  Psychomotor: normal or unremarkable  Mood: depressed   Affect: restricted; congruent to: mood- yes, content- yes  Thought Process/Associations: unremarkable  Thought Content:  Reports none;  Denies suicidal & violent ideation and delusions  Perception:  Reports none;  Denies auditory hallucinations and visual hallucinations  Insight: good  Judgment: good  Cognition: (6) oriented: time, person, and place  attention span: intact  concentration: intact  recent memory: intact  remote memory: intact  fund of knowledge: appropriate  Gait and Station: N/A (telehealth)    LABS and DATA     PHQ9 TODAY = not completed due to telehealth  PHQ 3/27/2019 10/6/2020 12/15/2020   PHQ-9 Total Score 17 14 15   Q9: Thoughts of better off dead/self-harm past 2 weeks Not at all Not at all Not at all       Recent Labs   Lab Test 09/10/20  1237 07/13/20  0853 02/21/17  0840   CR 0.97 0.96 0.92   GFRESTIMATED 89 90 87     Recent Labs   Lab Test 09/10/20  1237 07/13/20  0853 10/07/16  0948   AST 27 49* 15   ALT 50 85* 25   ALKPHOS  --   --  80       PSYCHOTROPIC DRUG INTERACTIONS     Increased risk of QTc prolongation: Flexeril, trazodone, escitalopram  Increased risk of serotonin syndrome: Flexeril, escitalopram, oxycodone, trazodone  Increased risk of CNS depression and paralytic ileus: oxycodone, trazodone    MANAGEMENT:  Monitoring for adverse effects and patient is aware of  "risks    RISK STATEMENT for SAFETY     Jailene Breen did not endorse suicidal ideation and did not appear to be at immanent risk of harm to self or others.    SUICIDE RISK ASSESSMENT-  Risk factors for self-harm: previous suicide attempt, recent symptom worsening, hopelessness, severe anxiety, financial/legal stress, significant pain and takes opiates.  Mitigating factors: no plan or intent, describes a safety plan, h/o seeking help , minimal substance use , future oriented, commitment to family and stable housing.  The patient does not appear to be at imminent risk for self-harm, hospitalization is not recommended which the pt does  agree to. No hospitalization will be arranged. Based on degree of symptoms therapy and close psych follow-up was/were recommended which the pt does  agree to. Additional steps to minimize risk: anxiety/ insomnia mngmt, med changes and frequent clinic visits.  Safety Plan placed in Pt Instructions: Yes.  Rate SI-N/A.    DIAGNOSES                            [m2, h3]    Major Depressive Disorder, recurrent, moderate  Generalized Anxiety Disorder, with panic    ASSESSMENT                         [m2, h3]        Today, patient presents with worsening symptoms of depression. Reports thoughts of not wanting to \"wake up\" but denies suicidal ideation. Reviewed safety plan to talk to wife and/or call clinic if worsening symptoms. Continues to identify some \"situational\" stressors that may be contributing to his symptoms, however many of these stressors have remained stable while depression, anxiety, and pain continue to worsen (as above in interim history). He has previously been trialed on multiple antidepressants (see 9/17/20 note from this writer for full list of medications) and given the partial response to escitalopram elected to augment today with trazodone. This may also be a more effective treatment for daytime anxiety than hydroxyzine, which will be discontinued today. Given previous " "trial of trazodone, patient recalls some daytime grogginess, will start low-dose to assess initial tolerability and uptitrate in 2-3 weeks if tolerated. Believe that he may benefit from hospitalization for more rapid medication titration to address worsening depression, however patient does not want to have another mental health hospitalization, previous hospitalization was felt to \"make things worse\". There are no immediate safety concerns today and does not meet criteria for involuntary hospitalization so hospitalization will not be pursued. In the future, given the number of medication interactions may benefit from follow-up with PCP for EKG monitoring. Refills provided.     [place]NPMP was checked today:  Indicates no medication misuse .    PLAN                                                   [m2, h3]                                               1) Medications  - Continue escitalopram 20mg daily   - Start trazodone 25mg at bedtime and 12.5mg daily as needed for anxiety.  - Discontinue hydroxyzine    - clonazepam 1mg by PCP    2) Therapy-  Continue with couples therapy.    3) Next Due   Labs- as needed  EKG- consider repeat EKG given multiple medications with risk of EKG prolongation, encouraged patient to follow-up with PCP.  Rating scales- serial PHQ9s    4) Referrals  Therapy- [facility] resident clinic referral.    5) RTC: 2-3 weeks    6) Crisis Numbers:   Provided routinely in AVS     After hours:  [phone]      TREATMENT RISK STATEMENT:  The risks, benefits, alternatives and potential adverse effects have been discussed and are understood by the pt. The pt understands the risks of using street drugs or alcohol. There are no medical contraindications, the pt agrees to treatment with the ability to do so. The pt knows to call the clinic for any problems or to access emergency care if needed.  Medical and substance use concerns are documented above.  Psychotropic drug interaction check was done, including changes " made today.    PROVIDER: Slim Sage MD    Patient was staffed in clinic with Dr. Hunter who will sign the note.  Supervisor is Dr. Solo.    TELEHEALTH ATTENDING ATTESTATION  Following the ACGME guidelines on telemedicine and direct supervision due to COVID-19, I was concurrently participating in and/or monitoring the patient care through appropriate telecommunication technology.  I discussed the key portions of the service with the resident, including the mental status examination and developing the plan of care. I reviewed key portions of the history with the resident. I agree with the findings and plan as documented in this note.   Chad Hunter MD

## 2021-02-25 NOTE — PROGRESS NOTES
"VIDEO VISIT  Jailene Breen is a 55 year old patient who is being evaluated via a billable video visit.      The patient has been notified of following:   \"This video visit will be conducted via a call between you and your physician/provider. We have found that certain health care needs can be provided without the need for an in-person physical exam. This service lets us provide the care you need with a video conversation. If a prescription is necessary we can send it directly to your pharmacy. If lab work is needed we can place an order for that and you can then stop by our lab to have the test done at a later time. Insurers are generally covering virtual visits as they would in-office visits so billing should not be different than normal.  If for some reason you do get billed incorrectly, you should contact the billing office to correct it and that number is in the AVS .    Video Conference to be completed via:  Kip    Patient has given verbal consent for video visit?:  Yes    Patient would prefer that any video invitations be sent by: Send to e-mail at: [email or web address]      How would patient like to obtain AVS?:  Trulioo    AVS SmartPhrase [PsychAVS] has been placed in 'Patient Instructions':  Yes    "

## 2021-02-25 NOTE — PATIENT INSTRUCTIONS
Nice to see you today Les. As we discussed:   - Start trazodone 25mg at bedtime with additional 12.5mg as needed during the day for anxiety (1/4 tablet).  - Follow-up with PCP about getting an EKG completed.  - Stop hydroxyzine  - Please call clinic if having any increase in symptoms of depression or suicidal thoughts:    Please call [phone] during business hours (8-5:00 M-F).   If after clinic hours, or on the weekend, please call  [phone].  - Try to limit Klonopin as much as possible.    ------------------------------------------------------------------------    Thank you for coming to the PSYCHIATRY CLINIC.    Lab Testing:  If you had lab testing today and your results are reassuring or normal they will be mailed to you or sent through Etece within 7 days. If the lab tests need quick action we will call you with the results. The phone number we will call with results is # [phone] (home) . If this is not the best number please call our clinic and change the number.    Medication Refills:  If you need any refills please call your pharmacy and they will contact us. Our fax number for refills is [phone]. Please allow three business for refill processing. If you need to  your refill at a new pharmacy, please contact the new pharmacy directly. The new pharmacy will help you get your medications transferred.     Scheduling:  If you have any concerns about today's visit or wish to schedule another appointment please call our office during normal business hours [phone] (8-5:00 M-F)    Contact Us:  Please call [phone] during business hours (8-5:00 M-F).  If after clinic hours, or on the weekend, please call  [phone].    Financial Assistance [phone]  [employer] Billing [phone]  [place] Billing Office, [facility]: [phone]  [place] Billing [phone]  Medical Records [phone]      MENTAL HEALTH CRISIS NUMBERS:  For a medical emergency please call  911 or go to the  nearest ER.     [facility]:   [facility] -[phone]   Crisis Residence [facility] -[phone]   Walk-In Counseling Center [facility] -[phone]   COPE 24/7 [place] Mobile Team -[phone] (adults)/[phone] (child)  CHILD: [facility] needs assessment team - [phone]      [facility]:   [facility] - [phone]   Walk-in counseling [facility] - [phone]   Walk-in counseling [facility] - [phone]   Crisis Residence [facility] - [phone]  [facility] Adult [facility]-[phone] mobile unit/ 24/7 crisis line    National Crisis Numbers:   National Suicide Prevention Lifeline: 4-773-578-TALK (221-899-3530)  Poison Control Center - 1-931.142.5870  [employer]/resources for a list of additional resources (SOS)  Trans Lifeline a hotline for transgender people 1-600.912.9756  [employer] a hotline for LGBT youth 1-576.519.2020  Crisis Text Line: For any crisis 24/7   To: 771306  see www.crisistextline.org  - IF MAKING A CALL FEELS TOO HARD, send a text!         Again thank you for choosing PSYCHIATRY CLINIC and please let us know how we can best partner with you to improve you and your family's health.    You may be receiving a survey regarding this appointment. We would love to have your feedback, both positive and negative. The survey is done by an external company, so your answers are anonymous.

## 2021-02-26 ENCOUNTER — MYC MEDICAL ADVICE (OUTPATIENT)
Dept: PSYCHIATRY | Facility: CLINIC | Age: 56
End: 2021-02-26

## 2021-02-26 NOTE — TELEPHONE ENCOUNTER
Sent patient a [employer] message asking for more information to help with writing accommodation letter for school.

## 2021-02-26 NOTE — TELEPHONE ENCOUNTER
"We did start trazodone yesterday, primarily to help augment antidepressant treatment. However, we did spend some time discussing how it could also be helpful with sleep and anxiety. I guess that is what he primarily heard and not about the depression augmentation. However, those effects could take several weeks. I believe that hospitalization would be beneficial to help with a faster titration schedule, possibly resulting in more rapid improvement. However, yesterday he was quite against hospitalization as he thought that his previous hospitalization \"made it worse\" and involuntary hospitalization was not indicated at that time.     I gave him the after hours number for us in the AVS (but do not see a note from the resident or from intake). Perhaps if you or Renetta can talk to him this morning about trazodone helping with depression as the primary reason we chose that medication (with benefit being it can also be used for anxiety) and make sure he has the number for the after hours clinic (intake/resident phone line number? I thought they were the same) that would be great!     I will also be at a conference and in-and-out of clin this morning but should be available by text if needed. Please let me know if there is anything else that I can do to help today.  Thanks,  Slim  "

## 2021-02-26 NOTE — TELEPHONE ENCOUNTER
I would be happy to write a letter for him. 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 It would also be helpful for me to then hear from him what sort of information they would need to have provided for him to have access to these accommodations. Does that make sense?     Thank you so much for following up on this!  Slim

## 2021-02-26 NOTE — TELEPHONE ENCOUNTER
"- Called patient to follow up on [employer] message thread   - Patient expresses hopelessness, states he feels miserable, doesn't know what else to do to make improvements   - Writer relayed info regarding trazodone as augmentation to depression treatment per Dr. Sage's request   - Patient affect is withdrawn, depressed, minimally responsive to information provided and assessment questions asked  - Writer inquired patient's thoughts on being assessed in ED. Patient had multiple follow up questions regarding taking home meds while hospitalized, ED wait time, length of stay, etc.   - Writer emphasized that going to the ED does not guarantee admission but provided information regarding process of admission and assessment in ED     - Patient decided to see how he does this weekend at home. He denies current SI/plan. He verbalized understanding to call clinic or go to ED if symptoms worsen. Verbalized that he has after hours number.     - Patient asked if Dr. Saeg would consider writing a letter to his school to tell them about his \"mental health struggle.\" Patient reports his grades are a major stressor.   - Will route to provider for further input  "

## 2021-03-01 ENCOUNTER — MYC MEDICAL ADVICE (OUTPATIENT)
Dept: PALLIATIVE MEDICINE | Facility: CLINIC | Age: 56
End: 2021-03-01

## 2021-03-01 NOTE — TELEPHONE ENCOUNTER
See the other n3/1/21 mychart encounter for more information.    Cha Molina RN-BSN  [facility]

## 2021-03-01 NOTE — TELEPHONE ENCOUNTER
Per patient y primehart message:  From: Jamison Breen      Created: 3/1/2021 9:49 AM      Hi, I've been going through a lot of depression and can't remember who I seen. I have an opportunity to go to a [place] today for 5 days through my [employer] that works on mental health and spiritual well-being, but going to have to wait till tomorrow after I  my pain meds. I've been also seeing a psychiatrist,  so really trying to work on this. But I would like to start seeing that pain psychologist again.      And   From: Jamison Breen      Created: 3/1/2021 2:07 PM      Hi, I can't get through your phone lines. I wanted to get the name of the pain psychologist I've seen before and to see if mt med can get filled today so I can attend this mental health retreat.      ___________________________    Called [facility] and released the oxycontin today.  It is still due to start on 3/4/21.    A vida Ã© feita de Descontohart sent to pt:  From: Cha Molina RN      Created: 3/1/2021 2:35 PM      Hi Jailene.  I called [facility]. You can  the oxycontin today to start on 3/4/21.     In 2020 you saw:   Santa Farrell PsyD LP  Licensed Psychologist     In 2018 you saw:  Padilla Keene MA, LP, [facility]  Behavioral Pain Specialist        Will keep encounter open for a couple of days in anticipation of patient call back.    SANDRA Eubanks-BSN  [facility]

## 2021-03-04 ENCOUNTER — MYC MEDICAL ADVICE (OUTPATIENT)
Dept: PALLIATIVE MEDICINE | Facility: CLINIC | Age: 56
End: 2021-03-04

## 2021-03-04 DIAGNOSIS — M06.9 RHEUMATOID ARTHRITIS INVOLVING MULTIPLE JOINTS (H): ICD-10-CM

## 2021-03-04 DIAGNOSIS — G89.4 CHRONIC PAIN SYNDROME: ICD-10-CM

## 2021-03-04 RX ORDER — OXYCODONE HYDROCHLORIDE 5 MG/1
5 TABLET ORAL EVERY 6 HOURS PRN
Qty: 90 TABLET | Refills: 0 | Status: ON HOLD | OUTPATIENT
Start: 2021-03-04 | End: 2021-03-26

## 2021-03-04 NOTE — TELEPHONE ENCOUNTER
Received call from patient requesting refill(s) of oxyCODONE (ROXICODONE) 5 MG tablet    Last dispensed from pharmacy on 02/08/21    Patient's last office/virtual visit by prescribing provider on 02/16/21  Next office/virtual appointment scheduled for 04/13/21    Last urine drug screen date 01/21/20  Current opioid agreement on file (completed within the last year) No Date of opioid agreement: 01/21/20    E-prescribe to pharmacy[id number] - [place] - [address]    Will route to nursing [place] for review and preparation of prescription(s).

## 2021-03-04 NOTE — TELEPHONE ENCOUNTER
Medication refill information reviewed.     Due date for oxyCODONE (ROXICODONE) 5 MG tablet is 3/10/21     Prescriptions prepped for review.     Will route to provider.     Josias Lopez, RN  Care Coordinator   [facility] [place]

## 2021-03-04 NOTE — TELEPHONE ENCOUNTER
Routed to the nursing pool and to the MA pool to process refill(s).    Cha Molina, RN-BSN  [facility]

## 2021-03-04 NOTE — TELEPHONE ENCOUNTER
Signed Prescriptions:                        Disp   Refills    oxyCODONE (ROXICODONE) 5 MG tablet         90 tab*0        Sig: Take 1 tablet (5 mg) by mouth every 6 hours as needed           for severe pain Max of 3 tabs/day.  Okay to           dispense 3/8/2021 and start 3/10/2021 30 day           script  Authorizing Provider: SUSANA PETTY    Reviewed [place]  March 4, 2021- no concerning fills.    Susana Petty APRN, RN CNP, FNP  [facility]  [facility]

## 2021-03-05 NOTE — TELEPHONE ENCOUNTER
Angio[employer]hardeepBehalf message sent with Rx approval from provider.  Fredi  Pain Clinic Management Team

## 2021-03-09 DIAGNOSIS — F17.200 TOBACCO USE DISORDER: ICD-10-CM

## 2021-03-09 DIAGNOSIS — M06.9 RHEUMATOID ARTHRITIS INVOLVING MULTIPLE SITES, UNSPECIFIED WHETHER RHEUMATOID FACTOR PRESENT (H): ICD-10-CM

## 2021-03-09 RX ORDER — METHOTREXATE 2.5 MG/1
4 TABLET ORAL
Qty: 48 TABLET | Refills: 0 | Status: SHIPPED | OUTPATIENT
Start: 2021-03-09 | End: 2021-07-12

## 2021-03-09 NOTE — TELEPHONE ENCOUNTER
METHOTREXATE SODIUM 2.5MG TABS  Last Written Prescription Date:  12/17/2020  Last Fill Quantity: 48,   # refills: 0  Last Office Visit: 12/17/2020  Future Office visit:  None    CBC RESULTS:   Recent Labs   Lab Test 09/10/20  1237   WBC 6.2   RBC 5.26   HGB 16.0   HCT 45.1   MCV 86   MCH 30.4   MCHC 35.5   RDW 13.0          Creatinine   Date Value Ref Range Status   09/10/2020 0.97 0.66 - 1.25 mg/dL Final   ]    Liver Function Studies -   Recent Labs   Lab Test 09/10/20  1237 10/07/16  0948 10/07/16  0948   PROTTOTAL  --   --  7.5   ALBUMIN 3.3*   < > 4.0   BILITOTAL  --   --  0.4   ALKPHOS  --   --  80   AST 27   < > 15   ALT 50   < > 25    < > = values in this interval not displayed.       Routing refill request to provider for review/approval because:  Drug not on the [facility], [facility] or [facility] refill protocol or controlled substance      Paulette Miller RN  Central Triage Red Flags/Med Refills

## 2021-03-10 ENCOUNTER — MYC MEDICAL ADVICE (OUTPATIENT)
Dept: FAMILY MEDICINE | Facility: CLINIC | Age: 56
End: 2021-03-10

## 2021-03-10 NOTE — TELEPHONE ENCOUNTER
Patient notified of refill via [employer]. Advised he is due for monitoring labs now.     KERLINE SalasN, RN  Medical Specialty Care Coordinator  [facility]

## 2021-03-12 ENCOUNTER — MYC REFILL (OUTPATIENT)
Dept: FAMILY MEDICINE | Facility: CLINIC | Age: 56
End: 2021-03-12

## 2021-03-12 DIAGNOSIS — F41.1 GAD (GENERALIZED ANXIETY DISORDER): ICD-10-CM

## 2021-03-12 RX ORDER — CLONAZEPAM 1 MG/1
TABLET ORAL
Qty: 30 TABLET | Refills: 0 | Status: CANCELLED | OUTPATIENT
Start: 2021-03-12

## 2021-03-15 ENCOUNTER — MYC MEDICAL ADVICE (OUTPATIENT)
Dept: FAMILY MEDICINE | Facility: CLINIC | Age: 56
End: 2021-03-15

## 2021-03-15 DIAGNOSIS — F41.1 GAD (GENERALIZED ANXIETY DISORDER): ICD-10-CM

## 2021-03-15 RX ORDER — CLONAZEPAM 1 MG/1
TABLET ORAL
Qty: 30 TABLET | Refills: 0 | Status: SHIPPED | OUTPATIENT
Start: 2021-03-15 | End: 2021-04-20

## 2021-03-15 NOTE — PROGRESS NOTES
"VIDEO VISIT  Jailene Breen is a 55 year old patient who is being evaluated via a billable video visit.      The patient has been notified of following:   \"We have found that certain health care needs can be provided without the need for an in-person physical exam. This service lets us provide the care you need with a video conversation. If a prescription is necessary we can send it directly to your pharmacy. If lab work is needed we can place an order for that and you can then stop by our lab to have the test done at a later time. Insurers are generally covering virtual visits as they would in-office visits so billing should not be different than normal.  If for some reason you do get billed incorrectly, you should contact the billing office to correct it and that number is in the AVS .    Patient has given verbal consent for video visit?: Yes   How would you like to obtain your AVS?: POKKT  AVS SmartPhrase [PsychAVS] has been placed in 'Patient Instructions': Yes      Video- Visit Details  Type of service:  video visit for medication management  Time of service:    Date:  03/16/2021    Video Start Time:  8:15 AM        Video End Time:  9:38 AM    Reason for video visit:  Patient unable to travel due to Covid-19  Originating Site (patient location):  [facility]   Location- Patient's home  Distant Site (provider location):  Remote location  Mode of Communication:  Video Conference via AmWell  Consent:  Patient has given verbal consent for video visit?: Yes        [facility]  Psychiatry Clinic  PSYCHIATRIC PROGRESS NOTE     CARE TEAM:    PCP- Aiden Resendez   Rheumatology- Raghu Parada MD  [facility]- Susana Pike APRN; Santa Farrell PsyD    Jailene Breen is a 55 year old patient who prefers the name Les and uses pronouns he, him, his.     PERTINENT BACKGROUND        [most recent eval 09/21/20]     Psych critical item history includes " "suicidal ideation, SIB [cutting], mutiple psychotropic trials , severe med reaction, psych hosp (<3), SUBSTANCE USE: alcohol, substance use treatment  and Major Medical Problems (Rheumatoid Arthritis and Ankylosing Spondylitis)    INTERIM HISTORY        The patient reports good treatment adherence.  History was provided by the patient who was a good historian.     Since the last visit:   - Started trazodone following 2/25/2021 session for anxiety and depression. Was taking most nights to help sleep with minimal effect, 4-5 nights. Took one 50mg tablet, no drowsiness or sedation. Did take 1/2 tablet several times during the day for anxiety, but also found to be minimally helpful. Last night tried trazodone and Klonopin after speaking with a pharmacist to make sure they would not cause significant interactions (several hours after initial trazodone dose).     - Reports \"I'm still here,\" \"pretty much the same.\" \"Sometimes, feeling pretty low. Wondering about whether or not going into the hospital would be helpful or not.\"  - Reports ongoing symptoms of depression including:  --  \"really low motivation. I'm so far behind in school.\" \"I'm thinking about dropping out\" because despite counselor's offers for accommodations \"I can't study and I can't remember things right now but I'm trying to do better\" and feels \"like the only way I can get caught up in school is if I feel normal again, but there's no way I could get caught up now with the way I'm feeling.\" Would like a letter for school \"letting them know that I'm going through\". \"I just want to drop this semester. I want to go back in the fall, but I just cannot get caught up right now.\"  -- Feeling stuck, trapped, and \"suffocated\"    - Continues to have suicidal ideation, which he reports has worsened since last appointment with increased intensity and frequency, despite interventions. He notes that he does not currently have plan or intent to attempt suicide and was " "joined by his wife at the end of the visit. However, he does have increasing thoughts about if he were to have a motor vehicle accident while he is running errands; run off the road or if another car were to hit him. At previous appointment was vehemently opposed to hospitalization, given previous negative experience. However, at this point \"I'm worried that I might do something\" and does not feel like he would be safe outside of the hospital anymore and would like to have a \"short admission\" to help maintain safety and make some medication adjustments.     - Called [facility] and spoke with Dr. Foley to relay patient would be presenting for admission.    RECENT PSYCH ROS:   Depression: depressed mood, anhedonia, low energy, poor concentration /memory, feeling worthless, excessive guilt, feeling hopeless, feeling trapped and overwhelmed  Elevated:  none  Psychosis:  none  Anxiety:  as above  Trauma Related:  none  Dysregulation:  none  Eating Disorder: no  Other: no    Adverse Effects:  denies  Pertinent Negative Symptoms: No violent ideation, aggression, psychosis, hallucinations, delusions, otto and hypomania    RECENT SUBSTANCE USE:     Alcohol- none since , used to attend AA meetings prior to COVID.  Tobacco- denies  Caffeine- yes  Cannabis- denies     Opioids- as prescribed           Narcan Kit- prescribed   Other illicit drugs- none    FAMILY and SOCIAL HISTORY   [1ea, 1ea]           [per patient report]            FAMILY HX:  Mom - depression (since  when sister )    SOCIAL HX:  Financial/ Work- currently attending college at [employer], is hoping to start school at [employer] for a degree in psychology after being unable to work due to diagnosis of rheumatoid arthritis and ankylosing spondylitis  Partner/ -  in .  Children- 23 and 25 year old kids      Living situation- [place], house with wife and 2 kids      Social/ Spiritual Support- Talks to " " every two weeks for lunch, but \"doesn't really have friends\". Family is supportive.     Legal- yes, history of DUI.    PSYCH and SUBSTANCE USE Critical Summary Points since July 2020 September - started escitalopram  November - increased escitalopram 20mg daily  February - stopped hydroxyzine; started trazodone 50mg at bedtime and 25mg daily as needed for anxiety    MEDICAL HISTORY  and ALLERGY     ALLERGIES: Mirtazapine, Hydrocodone, Ms contin [morphine], and Remeron soltab     Patient Active Problem List   Diagnosis     CARDIOVASCULAR SCREENING; LDL GOAL LESS THAN 160     Anxiety     Overweight (BMI 25.0-29.9)     Back pain     Tear meniscus knee, right, initial encounter     Rheumatoid arthritis involving multiple sites, unspecified rheumatoid factor presence     Chronic pain     Right-sided thoracic back pain, unspecified chronicity     JEFF (generalized anxiety disorder)     Panic attack     Syncope, unspecified syncope type     Ankylosing spondylitis (H)     Tobacco use disorder     Moderate major depression (H)     Immunocompromised (H)     Essential hypertension         MEDICAL REVIEW OF SYSTEMS     Contraception- none    A comprehensive review of systems was performed and is negative other than noted in the HPI.    MEDICATIONS          *Trazodone initiated 2/25/2021.*    Current Outpatient Medications   Medication Sig Dispense Refill     atenolol (TENORMIN) 25 MG tablet Take 1 tablet (25 mg) by mouth daily 90 tablet 1     benzonatate (TESSALON) 200 MG capsule Take 1 capsule (200 mg) by mouth 3 times daily as needed for cough 20 capsule 0     chlorzoxazone (PARAFON FORTE) 500 MG tablet Take 1 tablet (500 mg) by mouth 3 times daily as needed for muscle spasms 45 tablet 1     clonazePAM (KLONOPIN) 1 MG tablet TAKE ONE-HALF - 1 TABLET BY MOUTH ONCE DAILY AS NEEDED FOR ANXIETY 30 tablet 0     escitalopram (LEXAPRO) 10 MG tablet Take 2 tablets (20 mg) by mouth daily 60 tablet 2     etanercept " (ENBREL SURECLICK) 50 MG/ML autoinjector Inject 50 mg Subcutaneous once a week 50 mg 2     folic acid (FOLVITE) 1 MG tablet Take 3 tablets (3 mg) by mouth daily 180 tablet 3     guaiFENesin-codeine (ROBITUSSIN AC) 100-10 MG/5ML solution Take 5-10 mLs by mouth every 4 hours as needed for cough 180 mL 0     IBUPROFEN PO Take 800 mg by mouth every 6 hours as needed for moderate pain Reported on 5/19/2017       methotrexate sodium 2.5 MG TABS Take 4 tablets (10 mg) by mouth every 7 days 48 tablet 0     naloxone (NARCAN) 4 MG/0.1ML nasal spray Spray 1 spray (4 mg) into one nostril alternating nostrils as needed for opioid reversal every 2-3 minutes until assistance arrives 0.2 mL 0     nicotine polacrilex (NICORETTE) 4 MG gum PLACE 1 PIECE INSIDE THE CHEEK AS NEEDED FOR SMOKING CESSATION 200 each 3     oxyCODONE (OXYCONTIN) 10 MG 12 hr tablet Take 1 tablet (10 mg) by mouth every 12 hours Fill 3/2/2021. Begin 3/4/2021 60 tablet 0     oxyCODONE (ROXICODONE) 5 MG tablet Take 1 tablet (5 mg) by mouth every 6 hours as needed for severe pain Max of 3 tabs/day.  Okay to dispense 3/8/2021 and start 3/10/2021 30 day script 90 tablet 0     oxyCODONE-acetaminophen (PERCOCET) 5-325 MG tablet Take 1 tablet by mouth every 4 hours as needed for severe pain . Stop oxycodone. Use this for 1 week, then go back to your previous med and dosing. 42 tablet 0     traZODone (DESYREL) 50 MG tablet Take 0.5 tablets (25 mg) by mouth At Bedtime . With additional 0.25 tablet (12.5mg) as needed daily for anxiety. 30 tablet 1     VITALS                     There were no vitals taken for this visit.   MENTAL STATUS EXAM     [9, 14 cog gs]     Alertness: alert  and oriented  Appearance: adequately groomed  Behavior/Demeanor: cooperative, with good  eye contact   Speech: regular rate and rhythm  Language: intact  Psychomotor: normal or unremarkable  Mood: depressed   Affect: restricted; congruent to: mood- yes, content- yes  Thought Process/Associations:  unremarkable  Thought Content:  Reports suicidal ideation without plan; without intent [details in history];  Denies violent ideation and delusions   Perception:  Reports none;  Denies auditory hallucinations and visual hallucinations  Insight: good  Judgment: good  Cognition: (6) oriented: time, person, and place  attention span: intact  concentration: intact  recent memory: intact  remote memory: intact  fund of knowledge: appropriate  Gait and Station: N/A (telehealth)    LABS and DATA     PHQ9 TODAY = not completed due to telehealth  PHQ 3/27/2019 10/6/2020 12/15/2020   PHQ-9 Total Score 17 14 15   Q9: Thoughts of better off dead/self-harm past 2 weeks Not at all Not at all Not at all       Recent Labs   Lab Test 09/10/20  1237 07/13/20  0853 02/21/17  0840   CR 0.97 0.96 0.92   GFRESTIMATED 89 90 87     Recent Labs   Lab Test 09/10/20  1237 07/13/20  0853 10/07/16  0948   AST 27 49* 15   ALT 50 85* 25   ALKPHOS  --   --  80       PSYCHOTROPIC DRUG INTERACTIONS     Increased risk of QTc prolongation: Flexeril, trazodone, escitalopram  Increased risk of serotonin syndrome: Flexeril, escitalopram, oxycodone, trazodone  Increased risk of CNS depression and paralytic ileus: oxycodone, trazodone    MANAGEMENT:  Monitoring for adverse effects and patient is aware of risks    RISK STATEMENT for SAFETY     Jailene Breen did endorse suicidal ideation with recent thoughts of suicide means.  SUICIDE RISK ASSESSMENT-  Risk factors for self-harm: previous suicide attempt, recent symptom worsening, hopelessness, severe anxiety, financial/legal stress, significant pain and takes opiates.  Mitigating factors: no plan or intent, describes a safety plan, h/o seeking help , minimal substance use , future oriented, commitment to family and stable housing.  The patient does  appear to be at increased risk for self-harm, hospitalization is recommended which the pt does  agree to. Emergency room evaluation for recommended Voluntary  "hospitalization will be arranged.  Phoebe (wife) and Les reported that they feel safe with Phoebe driving him to [facility] ED, rather than having professional transportation.     DIAGNOSES                       Major Depressive Disorder, recurrent, severe, without psychotic features  Generalized Anxiety Disorder, with panic    ASSESSMENT                       Today, patient presents with worsening symptoms of depression and increasing intensity of suicidal ideation. He was last seen 2 weeks ago for similar symptoms and we attempted to manage symptoms as an outpatient. Today he is reporting increased frequency and intensity of suicidal ideation and thoughts, \"that my family would be better off without me\". He finds these thoughts distressing, and does not feel safe being at home anymore as he has started to think about harming himself or ending up in a fatal motor vehicle accident. Unwillingness to harm others is a protective factor preventing him from considering any sort of auto accident. Additional stressors include worsening academic performance, chronic pain (more recently with less relief from pain medications), financial stressors. At this point would recommend voluntary hospitalization for safety and more rapid medication titration to address worsening depression. He was agreeable to this plan during our appointment and involved his wife in decision to bring him to [place] ED to be considered for admission.     ** For most recent Diagnostic Evaluation see note by this writer dated 9/21/20 **    Called [facility] to update patient would be presenting and recommended admission based on our evaluation today.    Future consideration:  - psychiatric Partial Hospital Program or Acute Day Treatment;  - increase Trazodone dose;  - cross-taper off of escitalopram in favor of an SNRI;  - adjunct bupropion XL;  - adjunct use of aripiprazole or quetiapine XL for treatment resistant depression.     MNPMP " was checked today:  Indicates no medication misuse .    PLAN                                                                                   1) Medications  - Continue escitalopram 20mg daily   - Continue trazodone 50mg at bedtime and 25mg daily as needed for anxiety.    - clonazepam 1mg by PCP    2) Therapy-  Continue with couples therapy.    3) Next Due   Labs- as needed  EKG- consider repeat EKG given multiple medications with risk of EKG prolongation, encouraged patient to follow-up with PCP.  Rating scales- serial PHQ9s    4) Referrals  No Referrals needed    5) RTC: following hosptialization    6) Crisis Numbers:   Provided routinely in AVS     After hours:  [phone]      TREATMENT RISK STATEMENT:  The risks, benefits, alternatives and potential adverse effects have been discussed and are understood by the pt. The pt understands the risks of using street drugs or alcohol. There are no medical contraindications, the pt agrees to treatment with the ability to do so. The pt knows to call the clinic for any problems or to access emergency care if needed.  Medical and substance use concerns are documented above.  Psychotropic drug interaction check was done, including changes made today.    PROVIDER: Slim Sage MD    Patient was staffed via video with Dr. Hunter who will sign the note.  Supervisor is Dr. Solo.    TELEHEALTH ATTENDING ATTESTATION  Following the ACGME guidelines on telemedicine and direct supervision due to COVID-19, I was concurrently participating in and/or monitoring the patient care through appropriate telecommunication technology. I discussed the key portions of the service with the resident, including the mental status examination and developing the plan of care. I reviewed key portions of the history with the resident. Dr. Chu Ying and I met via video to discuss elements of Jamison' presentation and treatment plan; and his wife, Phoebe Breen met with all of us following  that to discuss treatment plan. I agree with the findings and plan as documented in this note.   Chad Hunter MD

## 2021-03-15 NOTE — PATIENT INSTRUCTIONS
Nice to see you today Les. As we discussed:   - Plan to present to [facility].    - Please call clinic if having any increase in symptoms of depression or suicidal thoughts:    Please call [phone] during business hours (8-5:00 M-F).   If after clinic hours, or on the weekend, please call  [phone].  - Try to limit Klonopin as much as possible.      **For crisis resources, please see the information at the end of this document**     Patient Education      Thank you for coming to the [facility] MENTAL HE[facility] & ADDI[facility] [place] [facility].    Lab Testing:  If you had lab testing today and your results are reassuring or normal they will be mailed to you or sent through [facility] within 7 days. If the lab tests need quick action we will call you with the results. The phone number we will call with results is # [phone] (home) . If this is not the best number please call our clinic and change the number.    Medication Refills:  If you need any refills please call your pharmacy and they will contact us. Our fax number for refills is [phone]. Please allow three business for refill processing. If you need to  your refill at a new pharmacy, please contact the new pharmacy directly. The new pharmacy will help you get your medications transferred.     Scheduling:  If you have any concerns about today's visit or wish to schedule another appointment please call our office during normal business hours [phone] (8-5:00 M-F)    Contact Us:  Please call [phone] during business hours (8-5:00 M-F).  If after clinic hours, or on the weekend, please call  [phone].    Financial Assistance [phone]  [facility] Billing [phone]  Central Billing Office, [facility]: [phone]  [facility] Billing [phone]  Medical Records [phone]  [facility] Patient Bill of Rights [email or web address]       MENTAL HEALTH  CRISIS NUMBERS:  For a medical emergency please call  911 or go to the nearest ER.     [facility]:   [facility] -[phone]   Crisis Residence [facility] -[phone]   Walk-In Counseling Center [facility] -[phone]   COPE 24/7 [place] Mobile Team -[phone] (adults)/[phone] (child)  CHILD: [facility] needs assessment team - [phone]      [facility]:   [facility] - [phone]   Walk-in counseling [facility] - [phone]   Walk-in counseling [facility] - [phone]   Crisis Residence [facility] - [phone]  Urgent Care Adult Mental Prcffx-[phone] mobile unit/ 24/7 crisis line    National Crisis Numbers:   National Suicide Prevention Lifeline: 4-324-360-TALK (060-180-0618)  Poison Control Center - 1-840.320.7177  [place]/resources for a list of additional resources (SOS)  Trans Lifeline a hotline for transgender people 1-924.778.2657  The Tu Project a hotline for LGBT youth 1-376.336.4355  Crisis Text Line: For any crisis 24/7   To: 224297  see www.crisistextline.org  - IF MAKING A CALL FEELS TOO HARD, send a text!         Again thank you for choosing [facility] MENTAL HEALTH & ADDICTION [facility] and please let us know how we can best partner with you to improve you and your family's health.    You may be receiving a survey regarding this appointment. We would love to have your feedback, both positive and negative. The survey is done by an external company, so your answers are anonymous.

## 2021-03-16 ENCOUNTER — VIRTUAL VISIT (OUTPATIENT)
Dept: PSYCHIATRY | Facility: CLINIC | Age: 56
End: 2021-03-16
Attending: PSYCHIATRY & NEUROLOGY
Payer: COMMERCIAL

## 2021-03-16 ENCOUNTER — HOSPITAL ENCOUNTER (EMERGENCY)
Facility: CLINIC | Age: 56
Discharge: PSYCHIATRIC OR SUBSTANCE USE FACILITY | End: 2021-03-16
Attending: EMERGENCY MEDICINE | Admitting: EMERGENCY MEDICINE
Payer: COMMERCIAL

## 2021-03-16 ENCOUNTER — HOSPITAL ENCOUNTER (INPATIENT)
Facility: CLINIC | Age: 56
LOS: 3 days | Discharge: HOME | End: 2021-03-19
Attending: PSYCHIATRY & NEUROLOGY | Admitting: PSYCHIATRY & NEUROLOGY
Payer: COMMERCIAL

## 2021-03-16 ENCOUNTER — TELEPHONE (OUTPATIENT)
Dept: PSYCHIATRY | Facility: CLINIC | Age: 56
End: 2021-03-16

## 2021-03-16 ENCOUNTER — TELEPHONE (OUTPATIENT)
Dept: BEHAVIORAL HEALTH | Facility: CLINIC | Age: 56
End: 2021-03-16

## 2021-03-16 VITALS
SYSTOLIC BLOOD PRESSURE: 116 MMHG | HEART RATE: 55 BPM | OXYGEN SATURATION: 96 % | HEIGHT: 76 IN | WEIGHT: 270 LBS | DIASTOLIC BLOOD PRESSURE: 71 MMHG | BODY MASS INDEX: 32.88 KG/M2 | TEMPERATURE: 97.3 F | RESPIRATION RATE: 18 BRPM

## 2021-03-16 DIAGNOSIS — M06.9 RHEUMATOID ARTHRITIS INVOLVING MULTIPLE SITES, UNSPECIFIED WHETHER RHEUMATOID FACTOR PRESENT (H): ICD-10-CM

## 2021-03-16 DIAGNOSIS — F51.05 INSOMNIA DUE TO OTHER MENTAL DISORDER: Primary | ICD-10-CM

## 2021-03-16 DIAGNOSIS — F99 INSOMNIA DUE TO OTHER MENTAL DISORDER: Primary | ICD-10-CM

## 2021-03-16 DIAGNOSIS — M54.6 RIGHT-SIDED THORACIC BACK PAIN, UNSPECIFIED CHRONICITY: ICD-10-CM

## 2021-03-16 DIAGNOSIS — R45.851 SUICIDAL IDEATION: ICD-10-CM

## 2021-03-16 DIAGNOSIS — F41.1 GAD (GENERALIZED ANXIETY DISORDER): ICD-10-CM

## 2021-03-16 DIAGNOSIS — G89.29 CHRONIC BACK PAIN, UNSPECIFIED BACK LOCATION, UNSPECIFIED BACK PAIN LATERALITY: ICD-10-CM

## 2021-03-16 DIAGNOSIS — M54.9 CHRONIC BACK PAIN, UNSPECIFIED BACK LOCATION, UNSPECIFIED BACK PAIN LATERALITY: ICD-10-CM

## 2021-03-16 DIAGNOSIS — G89.29 OTHER CHRONIC PAIN: ICD-10-CM

## 2021-03-16 DIAGNOSIS — F33.2 SEVERE RECURRENT MAJOR DEPRESSION WITHOUT PSYCHOTIC FEATURES (H): Primary | ICD-10-CM

## 2021-03-16 DIAGNOSIS — F32.1 MODERATE MAJOR DEPRESSION (H): ICD-10-CM

## 2021-03-16 LAB
AMPHETAMINES UR QL SCN: NEGATIVE
BARBITURATES UR QL: NEGATIVE
BENZODIAZ UR QL: NEGATIVE
CANNABINOIDS UR QL SCN: NEGATIVE
COCAINE UR QL: NEGATIVE
LABORATORY COMMENT REPORT: NORMAL
OPIATES UR QL SCN: NEGATIVE
PCP UR QL SCN: NEGATIVE
SARS-COV-2 RNA RESP QL NAA+PROBE: NEGATIVE
SPECIMEN SOURCE: NORMAL

## 2021-03-16 PROCEDURE — 99214 OFFICE O/P EST MOD 30 MIN: CPT | Mod: GC | Performed by: STUDENT IN AN ORGANIZED HEALTH CARE EDUCATION/TRAINING PROGRAM

## 2021-03-16 PROCEDURE — 80307 DRUG TEST PRSMV CHEM ANLYZR: CPT | Performed by: EMERGENCY MEDICINE

## 2021-03-16 PROCEDURE — 250N000013 HC RX MED GY IP 250 OP 250 PS 637: Performed by: STUDENT IN AN ORGANIZED HEALTH CARE EDUCATION/TRAINING PROGRAM

## 2021-03-16 PROCEDURE — 99285 EMERGENCY DEPT VISIT HI MDM: CPT | Mod: 25

## 2021-03-16 PROCEDURE — 124N000002 HC R&B MH UMMC

## 2021-03-16 PROCEDURE — C9803 HOPD COVID-19 SPEC COLLECT: HCPCS

## 2021-03-16 PROCEDURE — 87635 SARS-COV-2 COVID-19 AMP PRB: CPT | Performed by: EMERGENCY MEDICINE

## 2021-03-16 PROCEDURE — 250N000013 HC RX MED GY IP 250 OP 250 PS 637: Performed by: EMERGENCY MEDICINE

## 2021-03-16 RX ORDER — CHLORZOXAZONE 500 MG/1
500 TABLET ORAL 3 TIMES DAILY PRN
Status: DISCONTINUED | OUTPATIENT
Start: 2021-03-16 | End: 2021-03-19 | Stop reason: HOSPADM

## 2021-03-16 RX ORDER — ACETAMINOPHEN 325 MG/1
650 TABLET ORAL EVERY 4 HOURS PRN
Status: DISCONTINUED | OUTPATIENT
Start: 2021-03-16 | End: 2021-03-19 | Stop reason: HOSPADM

## 2021-03-16 RX ORDER — LANOLIN ALCOHOL/MO/W.PET/CERES
3 CREAM (GRAM) TOPICAL AT BEDTIME
Status: DISCONTINUED | OUTPATIENT
Start: 2021-03-16 | End: 2021-03-19 | Stop reason: HOSPADM

## 2021-03-16 RX ORDER — OLANZAPINE 10 MG/2ML
10 INJECTION, POWDER, FOR SOLUTION INTRAMUSCULAR 3 TIMES DAILY PRN
Status: DISCONTINUED | OUTPATIENT
Start: 2021-03-16 | End: 2021-03-19 | Stop reason: HOSPADM

## 2021-03-16 RX ORDER — CLONAZEPAM 0.5 MG/1
0.5 TABLET ORAL DAILY PRN
Status: DISCONTINUED | OUTPATIENT
Start: 2021-03-16 | End: 2021-03-19 | Stop reason: HOSPADM

## 2021-03-16 RX ORDER — ESCITALOPRAM OXALATE 20 MG/1
20 TABLET ORAL DAILY
Status: DISCONTINUED | OUTPATIENT
Start: 2021-03-17 | End: 2021-03-16 | Stop reason: HOSPADM

## 2021-03-16 RX ORDER — ATENOLOL 25 MG/1
25 TABLET ORAL DAILY
Status: DISCONTINUED | OUTPATIENT
Start: 2021-03-17 | End: 2021-03-16 | Stop reason: HOSPADM

## 2021-03-16 RX ORDER — LANOLIN ALCOHOL/MO/W.PET/CERES
3 CREAM (GRAM) TOPICAL
Status: DISCONTINUED | OUTPATIENT
Start: 2021-03-16 | End: 2021-03-16 | Stop reason: HOSPADM

## 2021-03-16 RX ORDER — MULTIVIT WITH MINERALS/LUTEIN
1 TABLET ORAL DAILY
COMMUNITY
End: 2021-11-30

## 2021-03-16 RX ORDER — OXYCODONE HCL 10 MG/1
10 TABLET, FILM COATED, EXTENDED RELEASE ORAL EVERY 12 HOURS
Status: DISCONTINUED | OUTPATIENT
Start: 2021-03-16 | End: 2021-03-16 | Stop reason: HOSPADM

## 2021-03-16 RX ORDER — NALOXONE HYDROCHLORIDE 0.4 MG/ML
0.4 INJECTION, SOLUTION INTRAMUSCULAR; INTRAVENOUS; SUBCUTANEOUS
Status: DISCONTINUED | OUTPATIENT
Start: 2021-03-16 | End: 2021-03-19 | Stop reason: HOSPADM

## 2021-03-16 RX ORDER — OLANZAPINE 10 MG/1
10 TABLET ORAL 3 TIMES DAILY PRN
Status: DISCONTINUED | OUTPATIENT
Start: 2021-03-16 | End: 2021-03-19 | Stop reason: HOSPADM

## 2021-03-16 RX ORDER — ATENOLOL 25 MG/1
25 TABLET ORAL DAILY
Status: DISCONTINUED | OUTPATIENT
Start: 2021-03-16 | End: 2021-03-19 | Stop reason: HOSPADM

## 2021-03-16 RX ORDER — CHLORZOXAZONE 500 MG/1
500 TABLET ORAL 3 TIMES DAILY PRN
Status: DISCONTINUED | OUTPATIENT
Start: 2021-03-16 | End: 2021-03-16

## 2021-03-16 RX ORDER — NALOXONE HYDROCHLORIDE 0.4 MG/ML
0.2 INJECTION, SOLUTION INTRAMUSCULAR; INTRAVENOUS; SUBCUTANEOUS
Status: DISCONTINUED | OUTPATIENT
Start: 2021-03-16 | End: 2021-03-19 | Stop reason: HOSPADM

## 2021-03-16 RX ORDER — POLYETHYLENE GLYCOL 3350 17 G/17G
17 POWDER, FOR SOLUTION ORAL DAILY PRN
Status: DISCONTINUED | OUTPATIENT
Start: 2021-03-16 | End: 2021-03-19 | Stop reason: HOSPADM

## 2021-03-16 RX ORDER — OXYCODONE HCL 10 MG/1
10 TABLET, FILM COATED, EXTENDED RELEASE ORAL 2 TIMES DAILY
Status: DISCONTINUED | OUTPATIENT
Start: 2021-03-16 | End: 2021-03-19 | Stop reason: HOSPADM

## 2021-03-16 RX ORDER — OXYCODONE HYDROCHLORIDE 5 MG/1
5 TABLET ORAL EVERY 4 HOURS PRN
Status: DISCONTINUED | OUTPATIENT
Start: 2021-03-16 | End: 2021-03-19 | Stop reason: HOSPADM

## 2021-03-16 RX ORDER — ACETAMINOPHEN 500 MG
1000 TABLET ORAL EVERY 6 HOURS PRN
COMMUNITY
End: 2022-10-31

## 2021-03-16 RX ORDER — MAGNESIUM HYDROXIDE/ALUMINUM HYDROXICE/SIMETHICONE 120; 1200; 1200 MG/30ML; MG/30ML; MG/30ML
30 SUSPENSION ORAL EVERY 4 HOURS PRN
Status: DISCONTINUED | OUTPATIENT
Start: 2021-03-16 | End: 2021-03-19 | Stop reason: HOSPADM

## 2021-03-16 RX ORDER — FOLIC ACID 1 MG/1
3 TABLET ORAL DAILY
Status: DISCONTINUED | OUTPATIENT
Start: 2021-03-17 | End: 2021-03-19 | Stop reason: HOSPADM

## 2021-03-16 RX ORDER — OXYCODONE HYDROCHLORIDE 5 MG/1
5 TABLET ORAL EVERY 6 HOURS PRN
Status: DISCONTINUED | OUTPATIENT
Start: 2021-03-16 | End: 2021-03-16

## 2021-03-16 RX ORDER — ESCITALOPRAM OXALATE 20 MG/1
20 TABLET ORAL DAILY
Status: DISCONTINUED | OUTPATIENT
Start: 2021-03-17 | End: 2021-03-19 | Stop reason: HOSPADM

## 2021-03-16 RX ORDER — HYDROXYZINE HYDROCHLORIDE 25 MG/1
25 TABLET, FILM COATED ORAL EVERY 4 HOURS PRN
Status: DISCONTINUED | OUTPATIENT
Start: 2021-03-16 | End: 2021-03-19 | Stop reason: HOSPADM

## 2021-03-16 RX ORDER — CHLORAL HYDRATE 500 MG
1 CAPSULE ORAL DAILY
COMMUNITY
End: 2021-11-30

## 2021-03-16 RX ORDER — OXYCODONE HCL 10 MG/1
10 TABLET, FILM COATED, EXTENDED RELEASE ORAL EVERY 12 HOURS
Status: DISCONTINUED | OUTPATIENT
Start: 2021-03-16 | End: 2021-03-16

## 2021-03-16 RX ORDER — CLONAZEPAM 0.5 MG/1
0.5 TABLET ORAL DAILY PRN
Status: DISCONTINUED | OUTPATIENT
Start: 2021-03-16 | End: 2021-03-16

## 2021-03-16 RX ORDER — OXYCODONE HYDROCHLORIDE 5 MG/1
5 TABLET ORAL EVERY 6 HOURS PRN
Status: DISCONTINUED | OUTPATIENT
Start: 2021-03-16 | End: 2021-03-16 | Stop reason: HOSPADM

## 2021-03-16 RX ADMIN — Medication 25 MG: at 20:47

## 2021-03-16 RX ADMIN — NICOTINE POLACRILEX 4 MG: 2 GUM, CHEWING BUCCAL at 20:47

## 2021-03-16 RX ADMIN — OXYCODONE HYDROCHLORIDE 10 MG: 10 TABLET, FILM COATED, EXTENDED RELEASE ORAL at 14:23

## 2021-03-16 RX ADMIN — OXYCODONE HYDROCHLORIDE 5 MG: 5 TABLET ORAL at 20:48

## 2021-03-16 RX ADMIN — OXYCODONE HYDROCHLORIDE 5 MG: 5 TABLET ORAL at 14:06

## 2021-03-16 RX ADMIN — OXYCODONE HYDROCHLORIDE 10 MG: 10 TABLET, FILM COATED, EXTENDED RELEASE ORAL at 21:49

## 2021-03-16 RX ADMIN — NICOTINE POLACRILEX 4 MG: 2 GUM, CHEWING BUCCAL at 19:36

## 2021-03-16 RX ADMIN — MELATONIN TAB 3 MG 3 MG: 3 TAB at 20:47

## 2021-03-16 RX ADMIN — NICOTINE POLACRILEX 4 MG: 2 GUM, CHEWING ORAL at 15:33

## 2021-03-16 RX ADMIN — CHLORZOXAZONE 500 MG: 500 TABLET ORAL at 22:12

## 2021-03-16 ASSESSMENT — ACTIVITIES OF DAILY LIVING (ADL)
TOILETING_ISSUES: NO
DRESSING/BATHING_DIFFICULTY: NO
HEARING_DIFFICULTY_OR_DEAF: NO
WALKING_OR_CLIMBING_STAIRS: STAIR CLIMBING DIFFICULTY, REQUIRES EQUIPMENT
EQUIPMENT_CURRENTLY_USED_AT_HOME: CANE, STRAIGHT
DOING_ERRANDS_INDEPENDENTLY_DIFFICULTY: NO
VISION_MANAGEMENT: GLASSES
WALKING_OR_CLIMBING_STAIRS_DIFFICULTY: YES
CONCENTRATING,_REMEMBERING_OR_MAKING_DECISIONS_DIFFICULTY: NO
FALL_HISTORY_WITHIN_LAST_SIX_MONTHS: YES
PATIENT_/_FAMILY_COMMUNICATION_STYLE: SPOKEN LANGUAGE (ENGLISH OR BILINGUAL)
WEAR_GLASSES_OR_BLIND: YES
DIFFICULTY_COMMUNICATING: NO
NUMBER_OF_TIMES_PATIENT_HAS_FALLEN_WITHIN_LAST_SIX_MONTHS: 1
DIFFICULTY_EATING/SWALLOWING: NO
WHICH_OF_THE_ABOVE_FUNCTIONAL_RISKS_HAD_A_RECENT_ONSET_OR_CHANGE?: AMBULATION

## 2021-03-16 ASSESSMENT — MIFFLIN-ST. JEOR
SCORE: 2171.21
SCORE: 2171.21

## 2021-03-16 NOTE — ED PROVIDER NOTES
"  History   Chief Complaint:  Psychiatric Evaluation    The history is provided by the patient.   History supplemented by a phone call from the patient psychiatrist as well as electronic chart review    Jailene Breen is a 55 year old male with a history of depression and anxiety who presents for psychiatric evaluation. The patient reports worsening depression and suicidality and was referred to the ED by Dr. Sage for inpatient psychiatric admission. He has been working with Dr. Sage for the past few months as an outpatient, but despite recent medication changes and other modes of treatment he continues to have suicidal thoughts. He does report a plan to crash his car into a tree. He states that the main barrier is not wanting to hurt his wife or kids. He is currently studying for a degree in psychology but has not been able to complete this work.  He also reports decreased energy and feeling overall unwell. He denies fever, cough, or congestion.     Review of Systems  All other systems are reviewed negative except as above in history of present illness    Allergies:  Mirtazapine  Hydrocodone  Ms Contin [Morphine]  Remeron Soltab    Medications:  Atenolol  Tessalon  Chlorzoxazone  Klonopin  Lexapro   Enbrel    Nicorette  Desyrel    Past Medical History:    Ankylosing spondylitis  Anxiety   Hypertension   Panic attacks   Immunocompromised  Depression  Generalized anxiety disorder   Panic attack   Chronic pain     Past Surgical History:    Knee arthroscopy   EGD x2  Inject paravertebral facet join lumbar   Orthopedic surgery   Orthopedic surgery      Social History:  Marital Status:   PCP: Aiden Resendez  Tobacco Use: Never Smoker  Alcohol Use: No    Physical Exam     Patient Vitals for the past 24 hrs:   BP Temp Temp src Pulse Resp SpO2 Height Weight   03/16/21 1050 (!) 142/69 97.3  F (36.3  C) Temporal 55 18 97 % 1.93 m (6' 4\") 122.5 kg (270 lb)       Physical Exam  General: " Nontoxic-appearing male sitting upright in room 18  Eyes: PERRL without nystagmus  CV: extremities well perfused, regular rhythm  Resp: normal effort, speaks in full phrases, no stridor, no cough observed  GI: abdomen soft and nontender, no guarding  MSK: no bony tenderness   Skin: appropriately warm and dry  Neuro: alert, clear speech, oriented, normal tone in extremities, ambulatory with steady gait  Psych: calm, cooperative, reports feeling suicidal, no evidence of hallucinations, somewhat flat affect    Emergency Department Course     Laboratory:  Drug Abuse Screen Urine: negative  Asymptomatic COVID-19 PCR: negative     Emergency Department Course:    Reviewed:  I reviewed the patient's nursing notes, vitals and past medical history.     Assessments:  1115 I performed an exam of the patient in room ED18 as documented above.  1335 Patient rechecked and updated.     Consults:    1250 I spoke with DEC regarding patient's presentation, findings, and plan of care.  1325 I spoke with DEC again.     Disposition:   Care of the patient was transferred to my colleague Dr. Devi pending bed placement.     Impression & Plan   Medical Decision Making:  He presents with worrisome progressive suicidal ideation and depressive symptoms despite very reasonable efforts by the psychiatry team to address these as an outpatient.  He was referred here for psychiatric hospitalization.  He was evaluated by DEC, who is spoken with central intake.  Acknowledging his chronic medical conditions, there is no evidence to suggest that they are contributing to his current presentation and further emergent testing is not indicated.  I have ordered his baseline outpatient medications including pain medication.  He is boarding the emergency department until an appropriate psychiatry bed can be secured.  He is calm and cooperative, voluntary at this time.  Care signed out to my colleague on the afternoon shift.    Covid-19  Jailene Breen  was evaluated during a global COVID-19 pandemic, which necessitated consideration that the patient might be at risk for infection with the SARS-CoV-2 virus that causes COVID-19.   Applicable protocols for evaluation were followed during the patient's care.   COVID-19 was considered as part of the patient's evaluation. The plan for testing is:  a test was obtained during this visit.    Diagnosis:    ICD-10-CM    1. Suicidal ideation  R45.851 Drug abuse screen 77 urine (FL, RH, SH)     Scribe Disclosure:  I, Suzy Claros, am serving as a scribe at 11:04 AM on 3/16/2021 to document services personally performed by Fredo Foley MD based on my observations and the provider's statements to me.    This note was completed in part using Dragon voice recognition software. Although reviewed after completion, some word and grammatical errors may occur.      Fredo Foley MD  03/16/21 [id number]

## 2021-03-16 NOTE — TELEPHONE ENCOUNTER
On March 16, 2021, at 7:45 AM, writer called patient at [phone] to confirm Virtual Visit. Writer unable to make contact with patient. Writer left detailed voice message for call back. [phone] left as call back number. Alyssa Koch, [facility]
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
## 2021-03-16 NOTE — PHARMACY-ADMISSION MEDICATION HISTORY
Pharmacy Medication History  Admission medication history interview status for the 3/16/2021  admission is complete. See EPIC admission navigator for prior to admission medications     Location of Interview: Phone  Medication history sources: Patient and Surescripts    Significant changes made to the medication list:  Added omeprazole  Deleted benzonatate, fentanyl, midazolam, robitussin AC, ibuprofen, percocet    In the past week, patient estimated taking medication this percent of the time: greater than 90%    Additional medication history information:   none    Medication reconciliation completed by provider prior to medication history? No    Time spent in this activity: 20 minutes    Prior to Admission medications    Medication Sig Last Dose Taking? Auth Provider   atenolol (TENORMIN) 25 MG tablet Take 1 tablet (25 mg) by mouth daily 3/16/2021 at am Yes Aiden Resendez PA   escitalopram (LEXAPRO) 10 MG tablet Take 2 tablets (20 mg) by mouth daily 3/16/2021 at am Yes Coffin, Richard Breyen, MD   etanercept (ENBREL SURECLICK) 50 MG/ML autoinjector Inject 50 mg Subcutaneous once a week 3/16/2021 at am Yes Raghu Parada MD   folic acid (FOLVITE) 1 MG tablet Take 3 tablets (3 mg) by mouth daily 3/16/2021 at am Yes Raghu Parada MD   methotrexate sodium 2.5 MG TABS Take 4 tablets (10 mg) by mouth every 7 days 3/16/2021 at am Yes Raghu Parada MD   nicotine polacrilex (NICORETTE) 4 MG gum PLACE 1 PIECE INSIDE THE CHEEK AS NEEDED FOR SMOKING CESSATION 3/16/2021 at am Yes Aiden Resendez PA   omeprazole (PRILOSEC) 20 MG DR capsule Take 20 mg by mouth daily 3/16/2021 at am Yes Unknown, Entered By History   oxyCODONE (OXYCONTIN) 10 MG 12 hr tablet Take 1 tablet (10 mg) by mouth every 12 hours Fill 3/2/2021. Begin 3/4/2021 3/15/2021 at pm Yes Susana Pike APRN CNP   oxyCODONE (ROXICODONE) 5 MG tablet Take 1 tablet (5 mg) by mouth every 6 hours as needed for severe pain Max of 3  tabs/day.  Okay to dispense 3/8/2021 and start 3/10/2021 30 day script 3/15/2021 at Unknown time Yes Susana Pike APRN CNP   traZODone (DESYREL) 50 MG tablet Take 0.5 tablets (25 mg) by mouth At Bedtime . With additional 0.25 tablet (12.5mg) as needed daily for anxiety. 3/15/2021 at pm Yes Coffin, Richard Breyen, MD   chlorzoxazone (PARAFON FORTE) 500 MG tablet Take 1 tablet (500 mg) by mouth 3 times daily as needed for muscle spasms prn  Susana Pike APRN CNP   clonazePAM (KLONOPIN) 1 MG tablet TAKE ONE-HALF - 1 TABLET BY MOUTH ONCE DAILY AS NEEDED FOR ANXIETY prn  Aiden Resendez PA       The information provided in this note is only as accurate as the sources available at the time of update(s)

## 2021-03-16 NOTE — TELEPHONE ENCOUNTER
Patient cleared and ready for behavioral bed placement: Yes     S Pt is a 55 year old male at the [facility] ed    B Pt met with his outpatient psychiatrist today MD Sage. Pt has past diagnosis.of MDD. Pt has medical conditions of Major Medical Problems (Rheumatoid Arthritis and Ankylosing Spondylitis). Pt has been having medication changes and poor difficulty sleeping. Pt is afraid he will commit suicide. Pt has SI thoughts of crashing his car and not wanting to wake up. Pt denies Hi, aggression, and psychosis. Pt is medically cleared and denies covid19 symptoms. Pt denies substance use and utox ordered.     A Vol    R 20N/[place]  - unit and ed aware of admission 215pm ed updated to call for report after shift change at 345pm

## 2021-03-16 NOTE — ED NOTES
Pt stating that he has prescription for 5mg Oxycodone tabs every 4 hours. Pharmacist looked into matter and found that order is actually 5mg tabs every 6 hours. Order correct in MAR.

## 2021-03-16 NOTE — SAFE
Jailene B Jamshid  March 16, 2021    Pt appears quite insightful, and willing to reach out for help.  He has been working closely with his psychiatrist, Dr Sage on med mgmt, and was awaiting his first scheduled therapy appointment.  He reports good support from his wife and reports that she and his family are his reasons to live.     I agree with Dr Sage's recommendation for admission at this time and pt is in agreement as well. He is able to contract for safety and is willing to let staff know should he have increased thoughts or urges for suicide.  He reports feeling some sense of relief now that he is in the hospital and is in agreement with transfer to [facility] if necessary.   Central Intake was notified and are working on placement.      Current Suicidal Ideation/Self-Injurious Concerns/Methods: Other thoughts of crashing car without intent    Inappropriate Sexual Behavior: No    Aggression/Homicidal Ideation: None - N/A      For additional details see full DEC assessment.       Nader Jimenez, LICSW

## 2021-03-16 NOTE — ED NOTES
Patient and I chatted about how he feels like he just wants someone to listen to him. Patient told me he is working towards a college degree, and that he has a wife. Patient used to be a runner, and felt like running was a good way to combat his depression. Because of his RA, patient has too much pain to run, and wants to find another outlet for his depression.

## 2021-03-17 LAB
ALBUMIN SERPL-MCNC: 3.3 G/DL (ref 3.4–5)
ALP SERPL-CCNC: 91 U/L (ref 40–150)
ALT SERPL W P-5'-P-CCNC: 30 U/L (ref 0–70)
ANION GAP SERPL CALCULATED.3IONS-SCNC: 5 MMOL/L (ref 3–14)
AST SERPL W P-5'-P-CCNC: 17 U/L (ref 0–45)
BASOPHILS # BLD AUTO: 0.1 10E9/L (ref 0–0.2)
BASOPHILS NFR BLD AUTO: 1 %
BILIRUB SERPL-MCNC: 0.6 MG/DL (ref 0.2–1.3)
BUN SERPL-MCNC: 12 MG/DL (ref 7–30)
CALCIUM SERPL-MCNC: 8.4 MG/DL (ref 8.5–10.1)
CHLORIDE SERPL-SCNC: 110 MMOL/L (ref 94–109)
CO2 SERPL-SCNC: 27 MMOL/L (ref 20–32)
CREAT SERPL-MCNC: 1.12 MG/DL (ref 0.66–1.25)
DIFFERENTIAL METHOD BLD: NORMAL
EOSINOPHIL # BLD AUTO: 0.4 10E9/L (ref 0–0.7)
EOSINOPHIL NFR BLD AUTO: 4.3 %
ERYTHROCYTE [DISTWIDTH] IN BLOOD BY AUTOMATED COUNT: 13.3 % (ref 10–15)
FOLATE SERPL-MCNC: 17.2 NG/ML
GFR SERPL CREATININE-BSD FRML MDRD: 74 ML/MIN/{1.73_M2}
GLUCOSE SERPL-MCNC: 92 MG/DL (ref 70–99)
HCT VFR BLD AUTO: 44.1 % (ref 40–53)
HGB BLD-MCNC: 15 G/DL (ref 13.3–17.7)
IMM GRANULOCYTES # BLD: 0 10E9/L (ref 0–0.4)
IMM GRANULOCYTES NFR BLD: 0.2 %
LYMPHOCYTES # BLD AUTO: 2.7 10E9/L (ref 0.8–5.3)
LYMPHOCYTES NFR BLD AUTO: 33 %
MCH RBC QN AUTO: 30.3 PG (ref 26.5–33)
MCHC RBC AUTO-ENTMCNC: 34 G/DL (ref 31.5–36.5)
MCV RBC AUTO: 89 FL (ref 78–100)
MONOCYTES # BLD AUTO: 0.7 10E9/L (ref 0–1.3)
MONOCYTES NFR BLD AUTO: 8 %
NEUTROPHILS # BLD AUTO: 4.4 10E9/L (ref 1.6–8.3)
NEUTROPHILS NFR BLD AUTO: 53.5 %
NRBC # BLD AUTO: 0 10*3/UL
NRBC BLD AUTO-RTO: 0 /100
PLATELET # BLD AUTO: 211 10E9/L (ref 150–450)
POTASSIUM SERPL-SCNC: 4.1 MMOL/L (ref 3.4–5.3)
PROT SERPL-MCNC: 6.7 G/DL (ref 6.8–8.8)
RBC # BLD AUTO: 4.95 10E12/L (ref 4.4–5.9)
SODIUM SERPL-SCNC: 142 MMOL/L (ref 133–144)
TSH SERPL DL<=0.005 MIU/L-ACNC: 2.01 MU/L (ref 0.4–4)
VIT B12 SERPL-MCNC: 466 PG/ML (ref 193–986)
WBC # BLD AUTO: 8.3 10E9/L (ref 4–11)

## 2021-03-17 PROCEDURE — 99223 1ST HOSP IP/OBS HIGH 75: CPT | Mod: AI | Performed by: PSYCHIATRY & NEUROLOGY

## 2021-03-17 PROCEDURE — 85025 COMPLETE CBC W/AUTO DIFF WBC: CPT | Performed by: PSYCHIATRY & NEUROLOGY

## 2021-03-17 PROCEDURE — 80053 COMPREHEN METABOLIC PANEL: CPT | Performed by: PSYCHIATRY & NEUROLOGY

## 2021-03-17 PROCEDURE — 124N000002 HC R&B MH UMMC

## 2021-03-17 PROCEDURE — 84443 ASSAY THYROID STIM HORMONE: CPT | Performed by: PSYCHIATRY & NEUROLOGY

## 2021-03-17 PROCEDURE — 250N000013 HC RX MED GY IP 250 OP 250 PS 637: Performed by: STUDENT IN AN ORGANIZED HEALTH CARE EDUCATION/TRAINING PROGRAM

## 2021-03-17 PROCEDURE — 82607 VITAMIN B-12: CPT | Performed by: PSYCHIATRY & NEUROLOGY

## 2021-03-17 PROCEDURE — 82746 ASSAY OF FOLIC ACID SERUM: CPT | Performed by: PSYCHIATRY & NEUROLOGY

## 2021-03-17 PROCEDURE — 36415 COLL VENOUS BLD VENIPUNCTURE: CPT | Performed by: PSYCHIATRY & NEUROLOGY

## 2021-03-17 RX ORDER — ARIPIPRAZOLE 2 MG/1
2 TABLET ORAL DAILY
Status: DISCONTINUED | OUTPATIENT
Start: 2021-03-17 | End: 2021-03-19 | Stop reason: HOSPADM

## 2021-03-17 RX ADMIN — OXYCODONE HYDROCHLORIDE 10 MG: 10 TABLET, FILM COATED, EXTENDED RELEASE ORAL at 19:15

## 2021-03-17 RX ADMIN — NICOTINE POLACRILEX 4 MG: 2 GUM, CHEWING BUCCAL at 10:38

## 2021-03-17 RX ADMIN — OMEPRAZOLE 20 MG: 20 CAPSULE, DELAYED RELEASE ORAL at 08:02

## 2021-03-17 RX ADMIN — ATENOLOL 25 MG: 25 TABLET ORAL at 08:02

## 2021-03-17 RX ADMIN — CHLORZOXAZONE 500 MG: 500 TABLET ORAL at 19:15

## 2021-03-17 RX ADMIN — OXYCODONE HYDROCHLORIDE 10 MG: 10 TABLET, FILM COATED, EXTENDED RELEASE ORAL at 08:02

## 2021-03-17 RX ADMIN — OXYCODONE HYDROCHLORIDE 5 MG: 5 TABLET ORAL at 17:17

## 2021-03-17 RX ADMIN — ARIPIPRAZOLE 2 MG: 2 TABLET ORAL at 12:07

## 2021-03-17 RX ADMIN — ESCITALOPRAM OXALATE 20 MG: 20 TABLET ORAL at 08:02

## 2021-03-17 RX ADMIN — OXYCODONE HYDROCHLORIDE 5 MG: 5 TABLET ORAL at 12:52

## 2021-03-17 RX ADMIN — FOLIC ACID 3 MG: 1 TABLET ORAL at 08:02

## 2021-03-17 RX ADMIN — NICOTINE POLACRILEX 4 MG: 2 GUM, CHEWING BUCCAL at 12:52

## 2021-03-17 RX ADMIN — NICOTINE POLACRILEX 4 MG: 2 GUM, CHEWING BUCCAL at 08:05

## 2021-03-17 RX ADMIN — NICOTINE POLACRILEX 4 MG: 2 GUM, CHEWING BUCCAL at 17:17

## 2021-03-17 RX ADMIN — OXYCODONE HYDROCHLORIDE 5 MG: 5 TABLET ORAL at 08:05

## 2021-03-17 RX ADMIN — NICOTINE POLACRILEX 4 MG: 2 GUM, CHEWING BUCCAL at 19:53

## 2021-03-17 RX ADMIN — Medication 25 MG: at 19:51

## 2021-03-17 RX ADMIN — MELATONIN TAB 3 MG 3 MG: 3 TAB at 19:51

## 2021-03-17 ASSESSMENT — ACTIVITIES OF DAILY LIVING (ADL)
ORAL_HYGIENE: INDEPENDENT
HYGIENE/GROOMING: INDEPENDENT
DRESS: SCRUBS (BEHAVIORAL HEALTH);INDEPENDENT

## 2021-03-17 NOTE — PROGRESS NOTES
----------------------------------------------------------------------------------------------------------  [facility]  Psychiatric Progress Note  Hospital Day #1    Jailene Breen MRN# [id number]   Age: 55 year old YOB: 1965   Date of Admission: 3/16/2021     Subjective   The patient was discussed with the treatment team and chart notes were reviewed.      Identifier: Jailene Breen is a 55 year old male previously diagnosed with major depressive disorder and generalized anxiety disorder who presents with severe depression and SI in the context of medication adherence, no substance use, psychosocial stressors including loss, chronic mental health issues, chronic pain, school issues, family dynamics, medical issues and unemployment. Patient is on hospital day #1. Legal status is Voluntary. Assessment is that the current presentation is consistent with  Major Depressive Disorder, recurrent, severe, without psychotic features.    SIO: (currently yes or no, has required one previously this admission?)    Sleep:     Vitals: VSS  Prescribed Medications: Taken as prescribed  PRN Medications: acetaminophen, alum & mag hydroxide-simethicone, chlorzoxazone, clonazePAM, hydrOXYzine, naloxone **OR** naloxone **OR** naloxone **OR** naloxone, nicotine polacrilex, OLANZapine **OR** OLANZapine, oxyCODONE, polyethylene glycol   - nicotine 4 mg gum taken on 3/16 at 1936, 2047 and 3/17 at 0805  - oxycodone 5 mg given on 3/16 at 2048 and 3/17 at 0805    Yesterday's changes:   - drugs abuse screen negative  - Covid negative    Overnight nursing notes:   Pt is 55 yr old male transferred from [facility] to station 20. Per report pt was referred to the ED by Dr. Sage for psychiatric admission due to worsening depression and suicidal ideations with plan to crash his car. Pt has medical conditions of Rheumatoid Arthritis and Ankylosing Spondylitis.  Upon arrival to station 20. Pt was  "calm and cooperative with admission. Pt contracts for safety while in hospital. Pt stated he wished not to be a life the only thing that stopped him is that he didn't want to hurt his wife and kids. Pt uses can for ambulation. Pt was given a walker and cane locked in the locker. Pt has chronic pain that he reports has contributed to worsening depression due to loss of ability to do things he used to do. Denies AH/VH. Pt was given prn oxycodone.           Staff report: Patient is restricted recipient for 'pain' medications . Is seen by pain management at [facility].     Patient interview:  Jailene Breen states that they feel \"so so.\" Patient was noted as cooperative, appropriate affect, and above average intelligence.   He states that he likes his psychiatrist and they have a good relationship. He feels that his pain levels seem to be increasing, pain meds don't seem to be doing the job they once did, which is a big factor for his mood. Can't work due to pain, trying to get on disability. 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 Went back to college in hopes to find different work and make a better life. Counselor at college has been through similar situation and he feels like seeing her overcome her barriers, makes him think that's something he can also do. Goal is to get Master's in psychology, trying to keep 3.8 GPA, not being able to keep up with his school work is very unlike him. So far behind will need a letter otherwise he is going to have to drop out, even if he was running on all cylinders there is no way he would be able to catch up.    Not getting along with his mother anymore, has not talked to her since last fall, posting things on Facebook that he does not agree with, stopped associating himself with his mom. Mom has not been same since his sister was killed. Feels bad that he distanced himself from his mom during that time because she was alone, so he reached out to " "her and things were going fine until last fall they just continued to argue. He decided he didn't \"need this crap\" and cut her off again.     Wife, kids, and mom were talking/had secrets behind his back, posting stuff on Facebook about narcissists referencing him. His daughter defended him though. Believes his mom needs help but she keeps refusing.    Having thoughts that his family would be better without him. His wife could move on and find someone better than him. Does not want to stress his wife out by telling her theses thoughts. Feels that she disagree with him if he told her she'd be better without him if he did tell her.    Would like to get on a medication that will help with his depression. Also wants to try different pain medications to get that under control, has been on the same pain medications for so long its not as effective as it once was.        --------------------------  Review of systems:    Patient has pain chronic - wants this to be addressed while he is in hospital; patient denies acute concerns     Objective   /81   Pulse 56   Temp 97.7  F (36.5  C) (Oral)   Resp 16   Ht 1.93 m (6' 4\")   Wt 122.5 kg (270 lb)   BMI 32.87 kg/m    Weight is 270 lbs 0 oz  Body mass index is 32.87 kg/m .    Mental Status Examination:    Oriented to:  Grossly Oriented, Person/Self and Situation  General:  Awake and Alert  Appearance:  appears stated age, Grooming is adequate, Dressed in hospital scrubs, Hair is short, buzzed and appropriate weight  Behavior:  calm, cooperative and engaged  Eye Contact:  good  Psychomotor:  no abnormal motor symptoms appreciated no catatonia present  Speech:  appropriate volume/tone, talkative and with good articulation  Language: Fluent in English with appropriate syntax and vocabulary.  Mood:  \" so so \"  Affect:  appropriate, congruent with mood and broad/full reactive  Thought Process:  coherent and linear  Thought Content:  suicidal ideation (passive), No VH and No " "AH; No apparent delusions - last few weeks has felt that family would be better without him  Associations:  intact  Insight:  fair due to understanding his situation and his need for help, having difficulty managing his situation with his mother  Judgment:  fair due to ability to make appropriate decisions for the most part, questionable actions in regards to the situation with his mother, trying to get help with overall depression as he sees this effecting his functionality  Attention Span:  grossly intact  Concentration:  grossly intact  Recent and Remote Memory:  grossly intact  Fund of Knowledge:  above average       Allergies     Allergies   Allergen Reactions     Mirtazapine      Other reaction(s): Coma     Hydrocodone Itching     Ms Contin [Morphine] Itching     Remeron Soltab Other (See Comments)     \"Blacked out\" for one week        Labs & Imaging     Data   Recent Results (from the past 72 hour(s))   Asymptomatic SARS-CoV-2 COVID-19 Virus (Coronavirus) by PCR    Collection Time: 03/16/21 12:47 PM    Specimen: Nasopharyngeal   Result Value Ref Range    SARS-CoV-2 Virus Specimen Source Nasopharyngeal     SARS-CoV-2 PCR Result NEGATIVE     SARS-CoV-2 PCR Comment (Note)    Drug abuse screen 77 urine ([place], [place], [place])    Collection Time: 03/16/21  1:23 PM   Result Value Ref Range    Amphetamine Qual Urine Negative NEG^Negative    Barbiturates Qual Urine Negative NEG^Negative    Benzodiazepine Qual Urine Negative NEG^Negative    Cannabinoids Qual Urine Negative NEG^Negative    Cocaine Qual Urine Negative NEG^Negative    Opiates Qualitative Urine Negative NEG^Negative    PCP Qual Urine Negative NEG^Negative     Pending Results     Trending Labs: none     Assessment     Principal psychiatric diagnosis:   - Major Depressive Disorder, recurrent, severe, without psychotic features     Secondary psychiatric diagnoses:   - Generalized anxiety disorder with panic  - Alcohol use disorder, severe, in sustained " remission    Diagnostic Impression:                Jailene Breen is a 55 year old  male with a past psychiatric history of MDD and JEFF who presented to the ED with SI in the context of visit with psychiatrist today and increased psychosocial stressors. Significant symptoms include SI, irritable, depressed and sleep issues. His last psychiatric hospitalization was in 2020 at [facility] for SIB.  He is currently followed by Slim Sage MD at [facility]. Current psychosocial stressors include loss, trauma, chronic mental health issues, family dynamics, medical issues and financial stress which he has been coping with by withdrawing.  Patient's support system includes family and outpatient team.  Substance use does not appear to be playing a contributing role in the patient's presentation.  There is genetic loading for mood and CD. Medical history does appear significant for worsening chronic pain secondary to autoimmune disorders.  The MSE is notable for passive SI, rambling speech, flattened prosody and frustrated mood. He denies recent self injurious behaviors. His reported symptoms of depressed mood, impaired sleep, lethargy, hopelessness, anhedonia  are consistent with his historic diagnosis of major depressive disorder.  Optimization of medications to target these symptom clusters in addition to evaluation of adquate outpatient supports will be targets for treatment during this admission.      Given that he currently has SI, patient warrants inpatient psychiatric hospitalization to maintain his safety. Disposition pending clinical stabilization, medication optimization and development of an appropriate discharge plan.     Risk for harm is moderate.  Risk factors: SI, maladaptive coping, trauma, family history, peer issues, family dynamics and past behaviors  Protective factors: family and engaged in treatment     PTA Medications:   Prior to Admission Medications   Prescriptions Last Dose Informant Patient  Reported? Taking?   acetaminophen (TYLENOL) 500 MG tablet 3/15/2021 at Unknown time  Yes Yes   Sig: Take 1,000 mg by mouth every 6 hours as needed for mild pain (headache)   atenolol (TENORMIN) 25 MG tablet 3/16/2021 at am Self No Yes   Sig: Take 1 tablet (25 mg) by mouth daily   chlorzoxazone (PARAFON FORTE) 500 MG tablet 3/15/2021 at Unknown time Self No Yes   Sig: Take 1 tablet (500 mg) by mouth 3 times daily as needed for muscle spasms   cholecalciferol (VITAMIN D3) 125 mcg (5000 units) capsule 3/16/2021 at am  Yes Yes   Sig: Take 125 mcg by mouth daily   clonazePAM (KLONOPIN) 1 MG tablet 3/15/2021 at hs Self No Yes   Sig: TAKE ONE-HALF - 1 TABLET BY MOUTH ONCE DAILY AS NEEDED FOR ANXIETY   escitalopram (LEXAPRO) 10 MG tablet 3/16/2021 at am Self No Yes   Sig: Take 2 tablets (20 mg) by mouth daily   etanercept (ENBREL SURECLICK) 50 MG/ML autoinjector 3/16/2021 at am Self No Yes   Sig: Inject 50 mg Subcutaneous once a week   fish oil-omega-3 fatty acids 1000 MG capsule 3/16/2021 at am  Yes Yes   Sig: Take 1 g by mouth daily   folic acid (FOLVITE) 1 MG tablet 3/16/2021 at am Self No Yes   Sig: Take 3 tablets (3 mg) by mouth daily   methotrexate sodium 2.5 MG TABS 3/16/2021 at am Self No Yes   Sig: Take 4 tablets (10 mg) by mouth every 7 days   multivitamin (CENTRUM SILVER) tablet 3/16/2021 at am  Yes Yes   Sig: Take 1 tablet by mouth daily   nicotine polacrilex (NICORETTE) 4 MG gum 3/16/2021 at am Self No Yes   Sig: PLACE 1 PIECE INSIDE THE CHEEK AS NEEDED FOR SMOKING CESSATION   omeprazole (PRILOSEC) 20 MG DR capsule 3/16/2021 at am Self Yes Yes   Sig: Take 20 mg by mouth 2 times daily    oxyCODONE (OXYCONTIN) 10 MG 12 hr tablet 3/15/2021 at am Self No Yes   Sig: Take 1 tablet (10 mg) by mouth every 12 hours Fill 3/2/2021. Begin 3/4/2021   oxyCODONE (ROXICODONE) 5 MG tablet 3/15/2021 at Unknown time Self No Yes   Sig: Take 1 tablet (5 mg) by mouth every 6 hours as needed for severe pain Max of 3 tabs/day.  Okay  to dispense 3/8/2021 and start 3/10/2021 30 day script   Patient taking differently: Take 5 mg by mouth every 4 hours as needed for severe pain Max of 3 tabs/day.  Okay to dispense 3/8/2021 and start 3/10/2021 30 day script   traZODone (DESYREL) 50 MG tablet 3/15/2021 at HS Self No Yes   Sig: Take 0.5 tablets (25 mg) by mouth At Bedtime . With additional 0.25 tablet (12.5mg) as needed daily for anxiety.   Patient taking differently: Take 50 mg by mouth At Bedtime       Facility-Administered Medications: None        Psychiatric course:  Jailene Breen was admitted to station 20 with attending Daniel Pro MD as a voluntary patient, and was treated in the therapeutic milieu with appropriate individual and group therapies.  He was started on augmentation therapy with aripiprazole (Abilify).                   Jailene Breen continued to meet criteria for inpatient hospitalization medication optimization, inpatient stabilization, and appropriate discharge planning.     Collateral:   ( -Date, Name, relationship to patient, phone#: information obtained)    Medical course:   Jailene Breen was physically examined by the ED prior to being transferred to the unit and was found to be medically stable and appropriate for admission.     Consults:   - PT for walker     Plan       Continue to monitor for and stabilize: SI and depressed      Today's Changes:   - start apripiprazole 2 mg  - curbside patient's outpatient provider pain management for acute/alternative interventions  - patient has COVID-19 vaccine first dose scheduled for 1440 on Friday, likely he will be ready for discharge that AM  - PT consult placed for walker sizing      Continue:    New medications initiated:   - aripirazole (Abilify) 2 mg daily     Outpatient medications continued:   - escitalopram 20 mg daily for MDD  - melatonin 3 mg at bedtime for insomnia  - trazodone 25 mg at bedtime for insomnia  - clonazepam 0.5 mg daily prn for anxiety  -  hydroxyzine q4h prn for anxiety    Hospital PRNs as ordered:     -Olanzapine 10 mg PO/IM prn Q2H severe agitation/psychosis  -Hydroxyzine 25-50 mg Q6H PRN for anxiety  -Tylenol 650 mg Q6H PRN for pain and fever  -Mylanta 30 ml Q4H PRN for indigestion       Medications: risks/benefits discussed with patient    -- PRN meds; acetaminophen, alum & mag hydroxide-simethicone, chlorzoxazone, clonazePAM, hydrOXYzine, naloxone **OR** naloxone **OR** naloxone **OR** naloxone, nicotine polacrilex, OLANZapine **OR** OLANZapine, oxyCODONE, polyethylene glycol      Pertinent Medical diagnoses and management:    # Rheumatoid Arthritis and Ankylosing Spondylitis  # Chronic pain, knee/hip  - etanercept 50 mg weekly injection, next dose on 3/23/21  - methotrexate 10 mg weekly, next dose on 3/23/21  - oxycodone 10 mg bid, for pain  - oxycodone 5 mg q4h, 3 tablets per day maximum, for breakthrough pain  - chlorzoxazone 500 mg tid for muscle spasms      Discontinued Medications (& Rationale):  - n/a      Legal Status: Voluntary      Disposition/Anticipated Discharge Date: Likely by Friday morning, pending clinical stabilization, medication optimization, and formulation of a safe discharge plan.       Safety Assessment:   Behavioral Orders   Procedures     Code 1 - Restrict to Unit     Fall precautions     Routine Programming     As clinically indicated     Status 15     Every 15 minutes.     Suicide precautions     Patients on Suicide Precautions should have a Combination Diet ordered that includes a Diet selection(s) AND a Behavioral Tray selection for Safe Tray - with utensils, or Safe Tray - NO utensils                Patient seen and discussed with my attending physician, Dr. Daniel Pro MD who is in agreement with my assessment and plan.      Sury Thomas MD  PGY-1 Psychiatry Resident      Attending Attestation:  I have reviewed the resident admit note and confirmed by my exam today the HPI, past psych, PMH, ROS, meds,  allergies, family and social histories.  I have also reviewed all available labs and vital signs.  I, Daniel Pro, saw and evaluated the patient with the resident physician.  I agree with the findings and plan of care as documented in the resident note.  I have reviewed all labs and vital signs.

## 2021-03-17 NOTE — PROGRESS NOTES
03/16/21 2113   Patient Belongings   Did you bring any home meds/supplements to the hospital?  No   Patient Belongings locker   Patient Belongings Put in Hospital Secure Location (Security or Locker, etc.) clothing;shoes   Belongings Search Yes   Clothing Search Yes   Second Staff Juan M       [facility] locker-Sweatpants, cell phone, short sleeve shirt, jacket, , rag, 's license wallet, cane.      Security-Cashpass Visa [id number], Green dot Visa [phone], [employer] Co-Pay card Visa [phone].     A               Admission:  I am responsible for any personal items that are not sent to the safe or pharmacy.  [facility] is not responsible for loss, theft or damage of any property in my possession.    Signature:  _________________________________ Date: _______  Time: _____                                              Staff Signature:  ____________________________ Date: ________  Time: _____      2nd Staff person, if patient is unable/unwilling to sign:    Signature: ________________________________ Date: ________  Time: _____     Discharge:  [facility] has returned all of my personal belongings:    Signature: _________________________________ Date: ________  Time: _____                                          Staff Signature:  ____________________________ Date: ________  Time: _____

## 2021-03-17 NOTE — PROGRESS NOTES
Pt is 55 yr old male transferred from [facility] to station 20. Per report pt was referred to the ED by Dr. Sage for psychiatric admission due to worsening depression and suicidal ideations with plan to crash his car. Pt has medical conditions of Rheumatoid Arthritis and Ankylosing Spondylitis.  Upon arrival to station 20. Pt was calm and cooperative with admission. Pt contracts for safety while in hospital. Pt stated he wished not to be a life the only thing that stopped him is that he didn't want to hurt his wife and kids. Pt uses can for ambulation. Pt was given a walker and cane locked in the locker. Pt has chronic pain that he reports has contributed to worsening depression due to loss of ability to do things he used to do. Denies AH/. Pt was given prn oxycodone.

## 2021-03-17 NOTE — PLAN OF CARE
BEHAVIORAL TEAM DISCUSSION    Participants:   Dr. Pro, Dr. Thomas, Coleen Hansen RN, Susana Peterson MA.LP, Med student, Pharm student    Anticipated length of stay:   3-5 days    Continued Stay Criteria/Rationale:   Worsening depression, SI    Medical/Physical:  RA  Ankylosing spondylitis  HTN  Hiatal hernia  Chronic pain    Precautions:   Behavioral Orders   Procedures     Code 1 - Restrict to Unit     Fall precautions     Routine Programming     As clinically indicated     Status 15     Every 15 minutes.     Suicide precautions     Patients on Suicide Precautions should have a Combination Diet ordered that includes a Diet selection(s) AND a Behavioral Tray selection for Safe Tray - with utensils, or Safe Tray - NO utensils       Plan:  Patient will have ongoing psychiatric assessment.  Medications will be reviewed and adjusted per MD as indicated.  Outpatient providers will be contacted for care coordination.  Hospital staff will provide a safe environment and a therapeutic milieu. Patient will be encouraged to participate in unit groups and activities.   CTC will continue to assess needs and  ensure appropriate follow up care is in place.       Rationale for change in precautions or plan:   No change in plan/precuations

## 2021-03-17 NOTE — PLAN OF CARE
Problem: General Plan of Care (Inpatient Behavioral)  Goal: Individualization/Patient Specific Goal (IP Behavioral)  Description: The patient and/or their representative will achieve their patient-specific goals related to the plan of care.    The patient-specific goals include:  Flowsheets (Taken 3/17/2021 1022)  Patient Strengths:   Engagement in hobbies, sports, arts, clubs   Setting and pursuing goals   Stable and supportive family   Utilized support systems   Has vocational interests i.e., hobbies   Stable housing   Adherent to medication regime  Note: Patient's goals:  Feel better  Absence of SI  Address pain    Plan for admission:  1. Stabilization of mood disorder symptoms  2. Absence of SI- safe with self  3. Medication management per MD's  4. Coordination of care with outpatient providers, family  5. Psychiatric follow up care in place

## 2021-03-17 NOTE — PROGRESS NOTES
Pt appeared to have slept soundly throughout overnight shift, sleeping a total of 7 hours. No concerns noted and no PRN medications were administered.

## 2021-03-17 NOTE — PHARMACY-ADMISSION MEDICATION HISTORY
Admission Medication History Completed by Pharmacy    See [facility] Admission Navigator for allergy information, preferred outpatient pharmacy, prior to admission medications and immunization status.     Medication History Sources:     Patient, Med hx note from [facility] ([facility], [facility]) from earlier today, Sure scripts    Changes made to PTA medication list (reason):    Added: Multivitamin, Vit D, fish oil, Tylenol (per pt)    Deleted: None    Changed:   o Omeprazole daily ---changed to--> BID (prescribed twice daily, but pt has only been taking it once daily)  o Oxycodone 5mg every 6 hours (max 3 tabs/day) ---changed to--> every 4 hours (max 3 tabs/day)   - per pt this is how he is taking it    Additional Information:    Pt interviewed by phone on unit. Pt was alert and able to answer questions easily. He was very familiar with his medications.    Pt states that he takes methotrexate on Tuesdays. Next dose due 3/23/21    Pt states he takes etanercept on Tuesdays. Next dose due 3/23/21. Pt states that he needs to call [place] [facility] to get a refill, but then he would be able to have someone bring his next dose to the hospital for administration.    Pt states that he typically takes his chlorzoxazone 2-3 times a day, but he isn't sure its really helping with muscle spasms.    Pt states that he has been taking his oxycodone 5mg every 4 hours (vs the prescribed every 6 hours) but never more than 3 tablets per day (although he states he would take more if he could). Pt states he takes all 3 tablets every day.     Pt reports taking his clonazepam a few times per week - always 1 tablet. He knows not to take it with his oxycodone and reports that if he takes his clonazepam in the evening he will skip his evening dose of oxycodone 10mg (12hr) tablet.    Pt has been using 1 tablet (50mg) of trazodone at bedtime, but he is not sure it is helpful.     Pt states that he currently has a headache (going on  for ~2 days) but he is not sure if that is due to caffeine withdrawal.     Preferred pharmacy is [id number] or [facility].    Prior to Admission medications    Medication Sig Last Dose Taking? Auth Provider   acetaminophen (TYLENOL) 500 MG tablet Take 1,000 mg by mouth every 6 hours as needed for mild pain (headache) 3/15/2021 at Unknown time Yes Unknown, Entered By History   atenolol (TENORMIN) 25 MG tablet Take 1 tablet (25 mg) by mouth daily 3/16/2021 at am Yes Aiden Resendez PA   chlorzoxazone (PARAFON FORTE) 500 MG tablet Take 1 tablet (500 mg) by mouth 3 times daily as needed for muscle spasms 3/15/2021 at Unknown time Yes Susana Pike APRN CNP   cholecalciferol (VITAMIN D3) 125 mcg (5000 units) capsule Take 125 mcg by mouth daily 3/16/2021 at am Yes Unknown, Entered By History   clonazePAM (KLONOPIN) 1 MG tablet TAKE ONE-HALF - 1 TABLET BY MOUTH ONCE DAILY AS NEEDED FOR ANXIETY 3/15/2021 at hs Yes Aiden Resendez PA   escitalopram (LEXAPRO) 10 MG tablet Take 2 tablets (20 mg) by mouth daily 3/16/2021 at am Yes Coffin, Richard Breyen, MD   etanercept (ENBREL SURECLICK) 50 MG/ML autoinjector Inject 50 mg Subcutaneous once a week 3/16/2021 at am Yes Raghu Parada MD   fish oil-omega-3 fatty acids 1000 MG capsule Take 1 g by mouth daily 3/16/2021 at am Yes Unknown, Entered By History   folic acid (FOLVITE) 1 MG tablet Take 3 tablets (3 mg) by mouth daily 3/16/2021 at am Yes Raghu Parada MD   methotrexate sodium 2.5 MG TABS Take 4 tablets (10 mg) by mouth every 7 days 3/16/2021 at am Yes Raghu Parada MD   multivitamin (CENTRUM SILVER) tablet Take 1 tablet by mouth daily 3/16/2021 at am Yes Unknown, Entered By History   nicotine polacrilex (NICORETTE) 4 MG gum PLACE 1 PIECE INSIDE THE CHEEK AS NEEDED FOR SMOKING CESSATION 3/16/2021 at am Yes Aiden Resendez PA   omeprazole (PRILOSEC) 20 MG DR capsule Take 20 mg by mouth 2 times daily   3/16/2021 at am Yes Unknown, Entered By History   oxyCODONE (OXYCONTIN) 10 MG 12 hr tablet Take 1 tablet (10 mg) by mouth every 12 hours Fill 3/2/2021. Begin 3/4/2021 3/15/2021 at am Yes Susana Pike APRN CNP   oxyCODONE (ROXICODONE) 5 MG tablet Take 1 tablet (5 mg) by mouth every 6 hours as needed for severe pain Max of 3 tabs/day.  Okay to dispense 3/8/2021 and start 3/10/2021 30 day script  Patient taking differently: Take 5 mg by mouth every 4 hours as needed for severe pain Max of 3 tabs/day.  Okay to dispense 3/8/2021 and start 3/10/2021 30 day script 3/15/2021 at Unknown time Yes Susana Pike APRN CNP   traZODone (DESYREL) 50 MG tablet Take 0.5 tablets (25 mg) by mouth At Bedtime . With additional 0.25 tablet (12.5mg) as needed daily for anxiety.  Patient taking differently: Take 50 mg by mouth At Bedtime  3/15/2021 at HS Yes Coffin, Richard Breyen, MD       Date completed: 03/16/21    Medication history completed by: Renetta Mejia - [facility] pharmacy intern
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
## 2021-03-17 NOTE — PROGRESS NOTES
Mental Health Management Group: 1 HOUR:    Patient attended mental health education group focusing on anxiety disorders. He was attentive throughout but did not have anything he wished to add to the group discussion.

## 2021-03-17 NOTE — PLAN OF CARE
Problem: Behavioral Health Plan of Care  Goal: Plan of Care Review  Outcome: No Change     Pt denies SI. Pt reports ow mood, feels tired.  Pt mostly isolative to self, spent good part of day in bed. Pt reports his son is moving in [place] soon with his girlfriend and hes going to miss him and he also states he helps with bills and will miss that.  Pt reports chronic pain and asked for his PRN Oxycodone early was told when can have next.  Pt been pleasant and cooperative upon approach.

## 2021-03-18 PROCEDURE — 999N000147 HC STATISTIC PT IP EVAL DEFER: Performed by: PHYSICAL THERAPIST

## 2021-03-18 PROCEDURE — 250N000013 HC RX MED GY IP 250 OP 250 PS 637: Performed by: STUDENT IN AN ORGANIZED HEALTH CARE EDUCATION/TRAINING PROGRAM

## 2021-03-18 PROCEDURE — 99232 SBSQ HOSP IP/OBS MODERATE 35: CPT | Mod: GC | Performed by: PSYCHIATRY & NEUROLOGY

## 2021-03-18 PROCEDURE — 124N000002 HC R&B MH UMMC

## 2021-03-18 PROCEDURE — 90791 PSYCH DIAGNOSTIC EVALUATION: CPT | Performed by: SOCIAL WORKER

## 2021-03-18 RX ORDER — LIDOCAINE 50 MG/G
OINTMENT TOPICAL EVERY 4 HOURS PRN
Status: DISCONTINUED | OUTPATIENT
Start: 2021-03-18 | End: 2021-03-19 | Stop reason: HOSPADM

## 2021-03-18 RX ADMIN — DICLOFENAC SODIUM 2 G: 10 GEL TOPICAL at 20:52

## 2021-03-18 RX ADMIN — FOLIC ACID 3 MG: 1 TABLET ORAL at 07:48

## 2021-03-18 RX ADMIN — NICOTINE POLACRILEX 4 MG: 2 GUM, CHEWING BUCCAL at 07:51

## 2021-03-18 RX ADMIN — NICOTINE POLACRILEX 4 MG: 2 GUM, CHEWING BUCCAL at 14:45

## 2021-03-18 RX ADMIN — Medication 25 MG: at 20:52

## 2021-03-18 RX ADMIN — OXYCODONE HYDROCHLORIDE 5 MG: 5 TABLET ORAL at 07:48

## 2021-03-18 RX ADMIN — DICLOFENAC SODIUM 2 G: 10 GEL TOPICAL at 16:43

## 2021-03-18 RX ADMIN — OXYCODONE HYDROCHLORIDE 10 MG: 10 TABLET, FILM COATED, EXTENDED RELEASE ORAL at 07:48

## 2021-03-18 RX ADMIN — OMEPRAZOLE 20 MG: 20 CAPSULE, DELAYED RELEASE ORAL at 07:48

## 2021-03-18 RX ADMIN — OXYCODONE HYDROCHLORIDE 10 MG: 10 TABLET, FILM COATED, EXTENDED RELEASE ORAL at 19:52

## 2021-03-18 RX ADMIN — MELATONIN TAB 3 MG 3 MG: 3 TAB at 20:51

## 2021-03-18 RX ADMIN — NICOTINE POLACRILEX 4 MG: 2 GUM, CHEWING BUCCAL at 10:18

## 2021-03-18 RX ADMIN — NICOTINE POLACRILEX 4 MG: 2 GUM, CHEWING BUCCAL at 11:43

## 2021-03-18 RX ADMIN — ARIPIPRAZOLE 2 MG: 2 TABLET ORAL at 07:48

## 2021-03-18 RX ADMIN — NICOTINE POLACRILEX 4 MG: 2 GUM, CHEWING BUCCAL at 18:18

## 2021-03-18 RX ADMIN — ESCITALOPRAM OXALATE 20 MG: 20 TABLET ORAL at 07:48

## 2021-03-18 RX ADMIN — NICOTINE POLACRILEX 4 MG: 2 GUM, CHEWING BUCCAL at 13:00

## 2021-03-18 RX ADMIN — CHLORZOXAZONE 500 MG: 500 TABLET ORAL at 14:45

## 2021-03-18 RX ADMIN — ATENOLOL 25 MG: 25 TABLET ORAL at 07:48

## 2021-03-18 RX ADMIN — OXYCODONE HYDROCHLORIDE 5 MG: 5 TABLET ORAL at 16:43

## 2021-03-18 RX ADMIN — NICOTINE POLACRILEX 4 MG: 2 GUM, CHEWING BUCCAL at 19:52

## 2021-03-18 RX ADMIN — OXYCODONE HYDROCHLORIDE 5 MG: 5 TABLET ORAL at 11:43

## 2021-03-18 ASSESSMENT — ACTIVITIES OF DAILY LIVING (ADL)
ORAL_HYGIENE: INDEPENDENT
HYGIENE/GROOMING: INDEPENDENT
ORAL_HYGIENE: INDEPENDENT
HYGIENE/GROOMING: INDEPENDENT
DRESS: INDEPENDENT
LAUNDRY: WITH SUPERVISION
DRESS: INDEPENDENT

## 2021-03-18 ASSESSMENT — ANXIETY QUESTIONNAIRES
4. TROUBLE RELAXING: SEVERAL DAYS
1. FEELING NERVOUS, ANXIOUS, OR ON EDGE: MORE THAN HALF THE DAYS
6. BECOMING EASILY ANNOYED OR IRRITABLE: NOT AT ALL
GAD7 TOTAL SCORE: 9
5. BEING SO RESTLESS THAT IT IS HARD TO SIT STILL: NOT AT ALL
3. WORRYING TOO MUCH ABOUT DIFFERENT THINGS: NEARLY EVERY DAY
7. FEELING AFRAID AS IF SOMETHING AWFUL MIGHT HAPPEN: NOT AT ALL
2. NOT BEING ABLE TO STOP OR CONTROL WORRYING: NEARLY EVERY DAY
IF YOU CHECKED OFF ANY PROBLEMS ON THIS QUESTIONNAIRE, HOW DIFFICULT HAVE THESE PROBLEMS MADE IT FOR YOU TO DO YOUR WORK, TAKE CARE OF THINGS AT HOME, OR GET ALONG WITH OTHER PEOPLE: VERY DIFFICULT

## 2021-03-18 ASSESSMENT — MIFFLIN-ST. JEOR: SCORE: 2183.91

## 2021-03-18 ASSESSMENT — PATIENT HEALTH QUESTIONNAIRE - PHQ9: SUM OF ALL RESPONSES TO PHQ QUESTIONS 1-9: 20

## 2021-03-18 NOTE — PLAN OF CARE
Daily CTC Note:    Work Completed:   The patient's care was discussed with the treatment team and chart notes were reviewed.   Patient met with team.  Reports no change in mood/symptoms thus far.  Continues to request pain meds due to worsening pain.  Dr. Thomas spoke to patient's pain med provider with recommendations.Will ensure pain med appt is in place.  Patient has covid vaccine scheduled tomorrow so would like to discharge tomorrow.  [facility] has left [facility] for both Psychiatry and therapy for outpatient appts.  [facility] will schedule pain med appt.    Discharge plan or goal:   Home with family  Resume care with outpatient providers    Barriers to discharge:   Continued need for mental health stabilization, medication management.  Discharge planned for tomorrow

## 2021-03-18 NOTE — PROGRESS NOTES
Welcome to the Adult Mental Health Outpatient Programs. Thank you for choosing us at [facility]!    Managing mental health symptoms while balancing life stressors can be a struggle. Our mental health programming will provide you the therapy, education, skills and support needed to improve your well-being and to live a healthy and more manageable lifestyle.    After completing the Diagnostic Assessment, you will receive a recommendation for a level of care or specialty service that is right for you. Our Outpatient Mental Health programs are all group-based and allow you to meet with peers who are trying to manage similar symptoms or life circumstances in a safe and therapeutic setting.    Program Recommendations for: Partial Hospitalization Program  You will be scheduled to join the following program: Adult Partial Hospitalization Program  You will be in the follow group /track:  Group ONE  Please attend:  Monday through Friday  at:  9a-3p and E/O Tuesday 1p-6p  Your scheduled start of the Program is on: Monday, March 22, 2021   What will happen next?      You will receive a series of calls from our [facility] providers and/or schedulers to prepare you for your program participation.    1. Admission Call: Program staff will contact you after your diagnostic assessment to provide a brief check in and to complete the admission process. We will ask you about concerns that may need to be addressed right away. This could include: personal safety, insurance clarification, technology access, or another concern you may have. This call may occur days in advance or right before your first scheduled group.    2. Physical Health Screen Call:  A nurse will contact you either prior to admission or during your first week in the program to ask a few required physical health screening questions and discuss concerns as needed.   3. Provider/Scheduling Call: Program staff may also call to schedule an individual  appointment with a psychiatrist or psychologist (therapist). This will depend on your needs and may be required for the program that was recommended for you. This program requirement does NOT replace your follow up with your community provider.  However, it is important that you do NOT see your community psychiatric provider on the same day that you see the program psychiatrist. If you do, this could result in a denial from your insurance. Please let us know if you have any concerns and we will help you.   4. WAIT LIST: If you are placed on the Wait List following your diagnostic assessment, you will receive a phone call within a few days to discuss a tentative start date and plan.  Please contact us at the phone number listed below at any time with additional questions or if you are choosing to be removed from the Wait List.   What if I still have questions or cannot attend as planned?  We hope to be able to answer your questions during the admission call. You can also reach out to us by contacting the program directly at:  Partial Hospitalization Program [phone].  Sincerely,   Your Outpatient Adult Mental Health Program Team.    Please include this letter as part of AVS. Client will be starting PHP on Monday 3.22.21.

## 2021-03-18 NOTE — PROGRESS NOTES
03/18/21 1413   General Information   Has Not Attended OT as of: 03/18/21     Abigail Salinas, OT on 3/18/2021 at 2:13 PM

## 2021-03-18 NOTE — PLAN OF CARE
PT orders received for PT evaluation and treatment: Patient needs walker on unit, he cant have cane. Current walker available not appropriate for size. Needs sizing and different walker.  Pt mobilizing around the unit independently, does not need a formal PT evaluation.  Pt has been up and ambulating independently with a walker, but reported that the walker he has been using is loud and the left wheel does not work.  Pt was provided with a different walker and walker height was adjusted to the appropriate height for the patient.  Pt was able to ambulate safely with the walker, pt does not have any mobility concerns, PT orders completed.
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
## 2021-03-18 NOTE — PROGRESS NOTES
----------------------------------------------------------------------------------------------------------  [facility]  Psychiatric Progress Note  Hospital Day #2    Jailene Breen MRN# [id number]   Age: 55 year old YOB: 1965   Date of Admission: 3/16/2021     Subjective   The patient was discussed with the treatment team and chart notes were reviewed.      Identifier: Jailene Breen is a 55 year old male previously diagnosed with major depressive disorder and generalized anxiety disorder who presents with severe depression and SI in the context of medication adherence, no substance use, psychosocial stressors including loss, chronic mental health issues, chronic pain, school issues, family dynamics, medical issues and unemployment. Patient is on hospital day #2. Legal status is Voluntary. Assessment is that the current presentation is consistent with  Major Depressive Disorder, recurrent, severe, without psychotic features.    SIO: (currently yes or no, has required one previously this admission?)    Sleep:  7 hours (03/18/21 0600)  Vitals: VSS  Prescribed Medications: Taken as prescribed  PRN Medications: acetaminophen, alum & mag hydroxide-simethicone, chlorzoxazone, clonazePAM, hydrOXYzine, naloxone **OR** naloxone **OR** naloxone **OR** naloxone, nicotine polacrilex, OLANZapine **OR** OLANZapine, oxyCODONE, polyethylene glycol   - nicotine 4 mg gum taken on 3/16 at 1936, 2047 and 3/17 at 0805  - oxycodone 5 mg given on 3/16 at 2048 and 3/17 at 0805    Yesterday's changes:   - start apripiprazole 2 mg  - curbside patient's outpatient provider pain management for acute/alternative interventions  - patient has COVID-19 vaccine first dose scheduled for 1440 on Friday, likely he will be ready for discharge that AM  - PT consult placed for walker sizing    Overnight nursing notes:   Les appeared to sleep 7 hours this night shift.  Appeared comfortable and no prns or  "snacks given or requested.    Staff report: Patient is restricted recipient for 'pain' medications . Is seen by pain management at [facility].     Patient interview:  Jailene Breen states that they feel \"I'm here\" \"Feels same as yesterday\". Feels sleepy. Walker makes a lot of noise, wish he could use his cane, can't care anything when using the walker. Understands safety issues, wont' let anyone take cane away from him, it'll be with him whole time. Identifies his chronic pain as a huge factor in his mental health issues. Getting tired of dealing with this pain. \"I'm falling apart, I'm tired of complaining\" Described poor experience with primary care physician who did not believe he was in a lot of pain, and would not give him pain medications. Adjustment from very active human to not being able to do anything has been difficult for him. Feels good about discharge tomorrow.    --------------------------  Review of systems:    Patient has pain chronic - wants this to be addressed while he is in hospital; patient denies acute concerns     Objective   /79   Pulse 58   Temp 98.5  F (36.9  C) (Oral)   Resp 16   Ht 1.93 m (6' 4\")   Wt 123.7 kg (272 lb 12.8 oz)   BMI 33.21 kg/m    Weight is 272 lbs 12.8 oz  Body mass index is 33.21 kg/m .    Mental Status Examination:    Oriented to:  Grossly Oriented, Person/Self and Situation  General:  Awake and Alert  Appearance:  appears stated age, Grooming is adequate, Dressed in hospital scrubs, Hair is short, buzzed and appropriate weight  Behavior:  calm, cooperative and engaged  Eye Contact:  good  Psychomotor:  no abnormal motor symptoms appreciated no catatonia present  Speech:  appropriate volume/tone, talkative and with good articulation  Language: Fluent in English with appropriate syntax and vocabulary.  Mood:  \" I'm here \"  Affect:  appropriate, congruent with mood and broad/full reactive  Thought Process:  coherent and linear  Thought Content:  suicidal " "ideation (passive), No VH and No AH; No apparent delusions - last few weeks has felt that family would be better without him  Associations:  intact  Insight:  fair due to understanding his situation and his need for help, having difficulty managing his situation with his mother  Judgment:  fair due to ability to make appropriate decisions for the most part, questionable actions in regards to the situation with his mother, trying to get help with overall depression as he sees this effecting his functionality  Attention Span:  grossly intact  Concentration:  grossly intact  Recent and Remote Memory:  grossly intact  Fund of Knowledge:  above average       Allergies     Allergies   Allergen Reactions     Mirtazapine      Other reaction(s): Coma     Hydrocodone Itching     Ms Contin [Morphine] Itching     Remeron Soltab Other (See Comments)     \"Blacked out\" for one week        Labs & Imaging     Data   Recent Results (from the past 72 hour(s))   Asymptomatic SARS-CoV-2 COVID-19 Virus (Coronavirus) by PCR    Collection Time: 03/16/21 12:47 PM    Specimen: Nasopharyngeal   Result Value Ref Range    SARS-CoV-2 Virus Specimen Source Nasopharyngeal     SARS-CoV-2 PCR Result NEGATIVE     SARS-CoV-2 PCR Comment (Note)    Drug abuse screen 77 urine ([place], [place], [place])    Collection Time: 03/16/21  1:23 PM   Result Value Ref Range    Amphetamine Qual Urine Negative NEG^Negative    Barbiturates Qual Urine Negative NEG^Negative    Benzodiazepine Qual Urine Negative NEG^Negative    Cannabinoids Qual Urine Negative NEG^Negative    Cocaine Qual Urine Negative NEG^Negative    Opiates Qualitative Urine Negative NEG^Negative    PCP Qual Urine Negative NEG^Negative   CBC with platelets differential    Collection Time: 03/17/21  7:48 AM   Result Value Ref Range    WBC 8.3 4.0 - 11.0 10e9/L    RBC Count 4.95 4.4 - 5.9 10e12/L    Hemoglobin 15.0 13.3 - 17.7 g/dL    Hematocrit 44.1 40.0 - 53.0 %    MCV 89 78 - 100 fl    MCH 30.3 26.5 - 33.0 pg    " MCHC 34.0 31.5 - 36.5 g/dL    RDW 13.3 10.0 - 15.0 %    Platelet Count 211 150 - 450 10e9/L    Diff Method Automated Method     % Neutrophils 53.5 %    % Lymphocytes 33.0 %    % Monocytes 8.0 %    % Eosinophils 4.3 %    % Basophils 1.0 %    % Immature Granulocytes 0.2 %    Nucleated RBCs 0 0 /100    Absolute Neutrophil 4.4 1.6 - 8.3 10e9/L    Absolute Lymphocytes 2.7 0.8 - 5.3 10e9/L    Absolute Monocytes 0.7 0.0 - 1.3 10e9/L    Absolute Eosinophils 0.4 0.0 - 0.7 10e9/L    Absolute Basophils 0.1 0.0 - 0.2 10e9/L    Abs Immature Granulocytes 0.0 0 - 0.4 10e9/L    Absolute Nucleated RBC 0.0    Comprehensive metabolic panel    Collection Time: 03/17/21  7:48 AM   Result Value Ref Range    Sodium 142 133 - 144 mmol/L    Potassium 4.1 3.4 - 5.3 mmol/L    Chloride 110 (H) 94 - 109 mmol/L    Carbon Dioxide 27 20 - 32 mmol/L    Anion Gap 5 3 - 14 mmol/L    Glucose 92 70 - 99 mg/dL    Urea Nitrogen 12 7 - 30 mg/dL    Creatinine 1.12 0.66 - 1.25 mg/dL    GFR Estimate 74 >60 mL/min/[1.73_m2]    GFR Estimate If Black 86 >60 mL/min/[1.73_m2]    Calcium 8.4 (L) 8.5 - 10.1 mg/dL    Bilirubin Total 0.6 0.2 - 1.3 mg/dL    Albumin 3.3 (L) 3.4 - 5.0 g/dL    Protein Total 6.7 (L) 6.8 - 8.8 g/dL    Alkaline Phosphatase 91 40 - 150 U/L    ALT 30 0 - 70 U/L    AST 17 0 - 45 U/L   TSH with free T4 reflex and/or T3 as indicated    Collection Time: 03/17/21  7:48 AM   Result Value Ref Range    TSH 2.01 0.40 - 4.00 mU/L   Folate    Collection Time: 03/17/21  7:48 AM   Result Value Ref Range    Folate 17.2 >5.4 ng/mL   Vitamin B12    Collection Time: 03/17/21  7:48 AM   Result Value Ref Range    Vitamin B12 466 193 - 986 pg/mL     Pending Results     Trending Labs: none     Assessment     Principal psychiatric diagnosis:   - Major Depressive Disorder, recurrent, severe, without psychotic features     Secondary psychiatric diagnoses:   - Generalized anxiety disorder with panic  - Alcohol use disorder, severe, in sustained  remission    Diagnostic Impression:                Jailene Breen is a 55 year old  male with a past psychiatric history of MDD and JEFF who presented to the ED with SI in the context of visit with psychiatrist today and increased psychosocial stressors. Significant symptoms include SI, irritable, depressed and sleep issues. His last psychiatric hospitalization was in 2020 at [facility] for SIB.  He is currently followed by Slim Sage MD at [facility]. Current psychosocial stressors include loss, trauma, chronic mental health issues, family dynamics, medical issues and financial stress which he has been coping with by withdrawing.  Patient's support system includes family and outpatient team.  Substance use does not appear to be playing a contributing role in the patient's presentation.  There is genetic loading for mood and CD. Medical history does appear significant for worsening chronic pain secondary to autoimmune disorders.  The MSE is notable for passive SI, rambling speech, flattened prosody and frustrated mood. He denies recent self injurious behaviors. His reported symptoms of depressed mood, impaired sleep, lethargy, hopelessness, anhedonia  are consistent with his historic diagnosis of major depressive disorder.  Optimization of medications to target these symptom clusters in addition to evaluation of adquate outpatient supports will be targets for treatment during this admission.      Given that he currently has SI, patient warrants inpatient psychiatric hospitalization to maintain his safety. Disposition pending clinical stabilization, medication optimization and development of an appropriate discharge plan.     Risk for harm is moderate.  Risk factors: SI, maladaptive coping, trauma, family history, peer issues, family dynamics and past behaviors  Protective factors: family and engaged in treatment     PTA Medications:   Prior to Admission Medications   Prescriptions Last Dose Informant Patient  Reported? Taking?   acetaminophen (TYLENOL) 500 MG tablet 3/15/2021 at Unknown time  Yes Yes   Sig: Take 1,000 mg by mouth every 6 hours as needed for mild pain (headache)   atenolol (TENORMIN) 25 MG tablet 3/16/2021 at am Self No Yes   Sig: Take 1 tablet (25 mg) by mouth daily   chlorzoxazone (PARAFON FORTE) 500 MG tablet 3/15/2021 at Unknown time Self No Yes   Sig: Take 1 tablet (500 mg) by mouth 3 times daily as needed for muscle spasms   cholecalciferol (VITAMIN D3) 125 mcg (5000 units) capsule 3/16/2021 at am  Yes Yes   Sig: Take 125 mcg by mouth daily   clonazePAM (KLONOPIN) 1 MG tablet 3/15/2021 at hs Self No Yes   Sig: TAKE ONE-HALF - 1 TABLET BY MOUTH ONCE DAILY AS NEEDED FOR ANXIETY   escitalopram (LEXAPRO) 10 MG tablet 3/16/2021 at am Self No Yes   Sig: Take 2 tablets (20 mg) by mouth daily   etanercept (ENBREL SURECLICK) 50 MG/ML autoinjector 3/16/2021 at am Self No Yes   Sig: Inject 50 mg Subcutaneous once a week   fish oil-omega-3 fatty acids 1000 MG capsule 3/16/2021 at am  Yes Yes   Sig: Take 1 g by mouth daily   folic acid (FOLVITE) 1 MG tablet 3/16/2021 at am Self No Yes   Sig: Take 3 tablets (3 mg) by mouth daily   methotrexate sodium 2.5 MG TABS 3/16/2021 at am Self No Yes   Sig: Take 4 tablets (10 mg) by mouth every 7 days   multivitamin (CENTRUM SILVER) tablet 3/16/2021 at am  Yes Yes   Sig: Take 1 tablet by mouth daily   nicotine polacrilex (NICORETTE) 4 MG gum 3/16/2021 at am Self No Yes   Sig: PLACE 1 PIECE INSIDE THE CHEEK AS NEEDED FOR SMOKING CESSATION   omeprazole (PRILOSEC) 20 MG DR capsule 3/16/2021 at am Self Yes Yes   Sig: Take 20 mg by mouth 2 times daily    oxyCODONE (OXYCONTIN) 10 MG 12 hr tablet 3/15/2021 at am Self No Yes   Sig: Take 1 tablet (10 mg) by mouth every 12 hours Fill 3/2/2021. Begin 3/4/2021   oxyCODONE (ROXICODONE) 5 MG tablet 3/15/2021 at Unknown time Self No Yes   Sig: Take 1 tablet (5 mg) by mouth every 6 hours as needed for severe pain Max of 3 tabs/day.  Okay  to dispense 3/8/2021 and start 3/10/2021 30 day script   Patient taking differently: Take 5 mg by mouth every 4 hours as needed for severe pain Max of 3 tabs/day.  Okay to dispense 3/8/2021 and start 3/10/2021 30 day script   traZODone (DESYREL) 50 MG tablet 3/15/2021 at HS Self No Yes   Sig: Take 0.5 tablets (25 mg) by mouth At Bedtime . With additional 0.25 tablet (12.5mg) as needed daily for anxiety.   Patient taking differently: Take 50 mg by mouth At Bedtime       Facility-Administered Medications: None        Psychiatric course:  Jailene Breen was admitted to station 20 with attending Daniel Pro MD as a voluntary patient, and was treated in the therapeutic milieu with appropriate individual and group therapies.  He was started on augmentation therapy with aripiprazole (Abilify). Patient's main concern is pain management, which will be addressed outpatient, plan to discharge tomorrow (3/19)                   Jailene Breen continued to meet criteria for inpatient hospitalization medication optimization, inpatient stabilization, and appropriate discharge planning.     Collateral:   ( -Date, Name, relationship to patient, phone#: information obtained)    Medical course:   Jailene Breen was physically examined by the ED prior to being transferred to the unit and was found to be medically stable and appropriate for admission.     Consults:   - PT for walker     Plan       Continue to monitor for and stabilize: SI and depressed      Today's Changes:   - lidocaine patch + Voltaren gel for chronic pain  - pain mgmt appt moved up to next Tuesday  - plan for discharge tomorrow      Continue:    New medications initiated:   - aripirazole (Abilify) 2 mg daily     Outpatient medications continued:   - escitalopram 20 mg daily for MDD  - melatonin 3 mg at bedtime for insomnia  - trazodone 25 mg at bedtime for insomnia  - clonazepam 0.5 mg daily prn for anxiety  - hydroxyzine q4h prn for anxiety    Hospital  PRNs as ordered:     -Olanzapine 10 mg PO/IM prn Q2H severe agitation/psychosis  -Hydroxyzine 25-50 mg Q6H PRN for anxiety  -Tylenol 650 mg Q6H PRN for pain and fever  -Mylanta 30 ml Q4H PRN for indigestion       Medications: risks/benefits discussed with patient    -- PRN meds; acetaminophen, alum & mag hydroxide-simethicone, chlorzoxazone, clonazePAM, hydrOXYzine, naloxone **OR** naloxone **OR** naloxone **OR** naloxone, nicotine polacrilex, OLANZapine **OR** OLANZapine, oxyCODONE, polyethylene glycol      Pertinent Medical diagnoses and management:    # Rheumatoid Arthritis and Ankylosing Spondylitis  # Chronic pain, knee/hip  - etanercept 50 mg weekly injection, next dose on 3/23/21  - methotrexate 10 mg weekly, next dose on 3/23/21  - oxycodone 10 mg bid, for pain  - oxycodone 5 mg q4h, 3 tablets per day maximum, for breakthrough pain  - chlorzoxazone 500 mg tid for muscle spasms      Discontinued Medications (& Rationale):  - n/a      Legal Status: Voluntary      Disposition/Anticipated Discharge Date: Likely by Friday morning, pending clinical stabilization, medication optimization, and formulation of a safe discharge plan.       Safety Assessment:   Behavioral Orders   Procedures     Code 1 - Restrict to Unit     Fall precautions     Routine Programming     As clinically indicated     Status 15     Every 15 minutes.     Suicide precautions     Patients on Suicide Precautions should have a Combination Diet ordered that includes a Diet selection(s) AND a Behavioral Tray selection for Safe Tray - with utensils, or Safe Tray - NO utensils                Patient seen and discussed with my attending physician, Dr. Daniel Pro MD who is in agreement with my assessment and plan.      Sury Thomas MD  PGY-1 Psychiatry Resident      Attending Attestation:  I, Daniel Pro, saw and evaluated the patient with the resident physician.  I agree with the findings and plan of care as documented in the resident  note.  I have reviewed all labs and vital signs.

## 2021-03-18 NOTE — CONSULTS
Inpatient Diagnostic Assessment order acknowledged.  Patient is anticipated to be seen on 3.18.21.     Jami Carranza, LICSW

## 2021-03-18 NOTE — PLAN OF CARE
Pt was out on the fringes on the milieu. He attended groups. He was pleasant and social. He denied any SI or anxiety. Pt did have complaints of pain and did receive prn medication for that .

## 2021-03-18 NOTE — PLAN OF CARE
"Reports feeling \"blah\" today, rates depression 9/10.  Denies suicidal thoughts and thoughts of self harm, does endorse having thoughts of not wanting to be alive.  Denies anxiety and hallucinations.  C/o chronic low back, bilateral hip and knee pain. Received oxycodone IR x 2 with some decrease in pain.    "

## 2021-03-18 NOTE — PROGRESS NOTES
Les appeared to sleep 7 hours this night shift.  Appeared comfortable and no prns or snacks given or requested.

## 2021-03-19 ENCOUNTER — BEH TREATMENT PLAN (OUTPATIENT)
Dept: BEHAVIORAL HEALTH | Facility: CLINIC | Age: 56
End: 2021-03-19
Attending: PSYCHIATRY & NEUROLOGY

## 2021-03-19 ENCOUNTER — IMMUNIZATION (OUTPATIENT)
Dept: NURSING | Facility: CLINIC | Age: 56
End: 2021-03-19
Payer: COMMERCIAL

## 2021-03-19 VITALS
BODY MASS INDEX: 33.22 KG/M2 | TEMPERATURE: 97.1 F | DIASTOLIC BLOOD PRESSURE: 79 MMHG | OXYGEN SATURATION: 97 % | SYSTOLIC BLOOD PRESSURE: 123 MMHG | HEIGHT: 76 IN | HEART RATE: 58 BPM | RESPIRATION RATE: 16 BRPM | WEIGHT: 272.8 LBS

## 2021-03-19 DIAGNOSIS — F33.2 MAJOR DEPRESSIVE DISORDER, RECURRENT EPISODE, SEVERE WITH MIXED FEATURES (H): ICD-10-CM

## 2021-03-19 PROBLEM — F99 INSOMNIA DUE TO OTHER MENTAL DISORDER: Status: ACTIVE | Noted: 2021-03-19

## 2021-03-19 PROBLEM — F51.05 INSOMNIA DUE TO OTHER MENTAL DISORDER: Status: ACTIVE | Noted: 2021-03-19

## 2021-03-19 PROCEDURE — 250N000013 HC RX MED GY IP 250 OP 250 PS 637: Performed by: STUDENT IN AN ORGANIZED HEALTH CARE EDUCATION/TRAINING PROGRAM

## 2021-03-19 PROCEDURE — 0011A PR COVID VAC MODERNA 100 MCG/0.5 ML IM: CPT

## 2021-03-19 PROCEDURE — 91301 PR COVID VAC MODERNA 100 MCG/0.5 ML IM: CPT

## 2021-03-19 PROCEDURE — 99238 HOSP IP/OBS DSCHRG MGMT 30/<: CPT | Mod: GC | Performed by: PSYCHIATRY & NEUROLOGY

## 2021-03-19 RX ORDER — LIDOCAINE 50 MG/G
OINTMENT TOPICAL EVERY 4 HOURS PRN
Qty: 50 G | Refills: 0 | Status: SHIPPED | OUTPATIENT
Start: 2021-03-19 | End: 2021-06-02

## 2021-03-19 RX ORDER — LANOLIN ALCOHOL/MO/W.PET/CERES
3 CREAM (GRAM) TOPICAL AT BEDTIME
Qty: 30 TABLET | Refills: 0 | Status: SHIPPED | OUTPATIENT
Start: 2021-03-19 | End: 2021-04-18

## 2021-03-19 RX ORDER — ARIPIPRAZOLE 2 MG/1
2 TABLET ORAL DAILY
Qty: 30 TABLET | Refills: 0 | Status: SHIPPED | OUTPATIENT
Start: 2021-03-19 | End: 2021-04-23 | Stop reason: DRUGHIGH

## 2021-03-19 RX ADMIN — OXYCODONE HYDROCHLORIDE 10 MG: 10 TABLET, FILM COATED, EXTENDED RELEASE ORAL at 07:53

## 2021-03-19 RX ADMIN — NICOTINE POLACRILEX 4 MG: 2 GUM, CHEWING BUCCAL at 07:13

## 2021-03-19 RX ADMIN — DICLOFENAC SODIUM 2 G: 10 GEL TOPICAL at 07:54

## 2021-03-19 RX ADMIN — ARIPIPRAZOLE 2 MG: 2 TABLET ORAL at 07:53

## 2021-03-19 RX ADMIN — ATENOLOL 25 MG: 25 TABLET ORAL at 07:53

## 2021-03-19 RX ADMIN — FOLIC ACID 3 MG: 1 TABLET ORAL at 07:53

## 2021-03-19 RX ADMIN — OXYCODONE HYDROCHLORIDE 5 MG: 5 TABLET ORAL at 07:53

## 2021-03-19 RX ADMIN — OXYCODONE HYDROCHLORIDE 5 MG: 5 TABLET ORAL at 00:02

## 2021-03-19 RX ADMIN — DICLOFENAC SODIUM 2 G: 10 GEL TOPICAL at 11:30

## 2021-03-19 RX ADMIN — OMEPRAZOLE 20 MG: 20 CAPSULE, DELAYED RELEASE ORAL at 07:53

## 2021-03-19 RX ADMIN — NICOTINE POLACRILEX 4 MG: 2 GUM, CHEWING BUCCAL at 07:58

## 2021-03-19 RX ADMIN — OXYCODONE HYDROCHLORIDE 5 MG: 5 TABLET ORAL at 04:17

## 2021-03-19 RX ADMIN — NICOTINE POLACRILEX 4 MG: 2 GUM, CHEWING BUCCAL at 09:30

## 2021-03-19 RX ADMIN — ESCITALOPRAM OXALATE 20 MG: 20 TABLET ORAL at 07:53

## 2021-03-19 ASSESSMENT — ANXIETY QUESTIONNAIRES: GAD7 TOTAL SCORE: 9

## 2021-03-19 ASSESSMENT — ACTIVITIES OF DAILY LIVING (ADL)
ORAL_HYGIENE: INDEPENDENT
HYGIENE/GROOMING: INDEPENDENT
LAUNDRY: WITH SUPERVISION
DRESS: STREET CLOTHES;INDEPENDENT

## 2021-03-19 NOTE — PLAN OF CARE
Daily CTC Note:    Work Completed:   The patient's care was discussed with the treatment team and chart notes were reviewed.   Patient met with team.  He reports mood is  better than when admitted but pain continues to be quite severe.    Patient denies any further thoguhts of self harm.  Denies any thoughts since admitted.  [facility] was able to get patient in to see his pain med MD on Tuesday 3/23.  Patient completed PHP intake and is scheduled to start on Monday 3/22.  Patient is also scheduled to see both Psychiatrist and therapist.      Discharge plan or goal:   Patient returning home today with family.  Resume care with outpatient providers  Patient enrolled in PHP    Barriers to discharge:   None- discharge today

## 2021-03-19 NOTE — PLAN OF CARE
Problem: Behavioral Health Plan of Care  Goal: Plan of Care Review  Outcome: Adequate for Discharge  Goal: Patient-Specific Goal (Individualization)  Outcome: Adequate for Discharge  Goal: Adheres to Safety Considerations for Self and Others  Outcome: Adequate for Discharge  Goal: Absence of New-Onset Illness or Injury  Outcome: Adequate for Discharge  Goal: Optimized Coping Skills in Response to Life Stressors  Outcome: Adequate for Discharge  Goal: Develops/Participates in Therapeutic [place] to Support Successful Transition  Outcome: Adequate for Discharge     Problem: Suicidal Behavior  Goal: Suicidal Behavior is Absent or Managed  Outcome: Adequate for Discharge     Problem: General Plan of Care (Inpatient Behavioral)  Goal: Individualization/Patient Specific Goal (IP Behavioral)  Description: The patient and/or their representative will achieve their patient-specific goals related to the plan of care.    The patient-specific goals include:  Outcome: Adequate for Discharge  Goal: Patient Schedule  Description: Patient's Schedule:  Outcome: Adequate for Discharge  Goal: Release of Information  Outcome: Adequate for Discharge  Goal: Team Discussion  Description: Team Plan:  Outcome: Adequate for Discharge  Goal: Discharge Planning  Outcome: Adequate for Discharge  Goal: Physician Review  Outcome: Adequate for Discharge  Goal: Plan of Care Review (Adult,OB,Behavioral)  Description: The patient and/or their representative will communicate an understanding of their plan of care.  Outcome: Adequate for Discharge  Pt discharged as per order. Pt stated he had flu vaccine for this season already.  Pt denied SI.  Reviewed discharge paperwork with pt. Pt pleasant and cooperative.  Pt discharged with all belongings and discharge paperwork.  Pts wife came and picked pt up.

## 2021-03-19 NOTE — PLAN OF CARE
"Pt was visible in the milieu but not social with others. Pt presents with flat affect but brightens upon approach. Pt denies SI/SIB and hallucinations. Endorses depression \"5\" and describes mood as \"low\". Pt says he is looking forward do discharge to [facility] and start partial treatment next week. Pt took prn oxycodone x1 this shift.   "

## 2021-03-19 NOTE — DISCHARGE SUMMARY
"    ----------------------------------------------------------------------------------------------------------  [facility], [place]   Discharge Summary  Hospital Day #3    Jailene Breen   MRN# [id number]   Age: 55 year old YOB: 1965   Date of Admission:  3/16/2021  Date of Discharge:  3/19/2021  1:14 PM  Admitting Physician:  Daniel Pro MD  Discharge Physician:  aDniel Pro MD     Event Leading to Hospitalization:     History obtained from patient and electronic chart     Jailene Breen is a 55 year old  man with a past psychiatric history of major depressive disorder and generalized anxiety disorder admitted from the  ER on 03/16/2021 due to concern for SI in the context of medication adherence, no substance use,  psychosocial stressors including loss, chronic mental health issues, school issues, family dynamics, medical issues and unemployment.  Patient was referred by his psychiatrist today after his video visit when he endorsed worsening suicidal thoughts.      Per ED Note:   \"Jailene Breen is a 55 year old male with a history of depression and anxiety who presents for psychiatric evaluation. The patient reports worsening depression and suicidality and was referred to the ED by Dr. Sage for inpatient psychiatric admission. He has been working with Dr. Sage for the past few months as an outpatient, but despite recent medication changes and other modes of treatment he continues to have suicidal thoughts. He does report a plan to crash his car into a tree. He states that the main barrier is not wanting to hurt his wife or kids. He is currently studying for a degree in psychology but has not been able to complete this work.  He also reports decreased energy and feeling overall unwell. He denies fever, cough, or congestion. \"     He was medically cleared for admission to inpatient psychiatric unit.     Per patient report:    Jailene YANES" "Jamshid reports that since  he has been experiencing symptoms including feelings of worthlessness, worsening depression, suicidal thoughts, broken sleep, insomnia and fatigue.   He reports last being well 3 months ago. He attributes his symptoms & decompensation to pain, finances and fighting with mother. He reports he has been taking their psychiatric medications which include escitalopram, clonazepam and trazodone . He last took these medications 1 day ago. He reports denies recent substance use.  He reports other current stressors including chronic medical issues, failing classes at school and unemployment.  He was last seen by his outpatient psychiatric provider today.     Patient reports worsening pain is the primary  of his current mood.  He additionally reports that he is getting behind in school and will need to drop out, family conflicts with his mother and financial struggles.     He primary goal for this hospitalization is \"to try and find a medication that will help\".      Per DEC Assessment:  \" Jailene Breen is a 55-year-old male presenting to the ED after discussion with the psychiatrist today.  Notes from that discussion with Dr. Rubi, [facility] psychiatry resident are as follows: \"Continues to have suicidal ideation, which she reports has worsened since last appointment with increased intensity and frequency, despite interventions.  He notes that he does not currently have a plan or intent to attempt suicide and was joined by his wife at the end of the visit.  However he does have increasing thoughts about if he were to have a motor vehicle accident while running errands; run off the road or if another car were to hit him.  At previous appointments he was vehemently opposed to hospitalization, given previous negative experiences.  However at this point \"I am worried that he might do something\" and does not feel like he would be safe outside the hospital anymore and would like to have a \"short " "admission\" to help maintain safety and make some medication adjustments.\"     ED/Hospital Course   In the ED patient was assessed and determined medically stable and appropriate for inpatient psychiatric admission.  In the ED patient received urine drug screen which was pan negative, COVID-19 testing which was negative and chest x-ray radiograph was unremarkable.     The risks, benefits, alternatives and side effects have been discussed and are understood by the patient and other caregivers.    See Admission note by Daniel Pro MD on 3/16/2021 for additional details.      Objective:   /79   Pulse 58   Temp 98.5  F (36.9  C) (Oral)   Resp 16   Ht 1.93 m (6' 4\")   Wt 123.7 kg (272 lb 12.8 oz)   BMI 33.21 kg/m    Weight is 272 lbs 12.8 oz  Body mass index is 33.21 kg/m .    Mental Status Examination:    Oriented to:  Grossly Oriented, Person/Self and Situation  General:  Awake and Alert  Appearance:  appears stated age, Grooming is adequate, Dressed in hospital scrubs, Hair is short, buzzed and appropriate weight  Behavior:  calm, cooperative and engaged  Eye Contact:  good  Psychomotor:  no abnormal motor symptoms appreciated no catatonia present  Speech:  appropriate volume/tone, talkative and with good articulation  Language: Fluent in English with appropriate syntax and vocabulary.  Mood:  \"better\"  Affect:  appropriate, congruent with mood and broad/full reactive  Thought Process:  coherent and linear  Thought Content:  suicidal ideation (passive), No VH and No AH; No apparent delusions - last few weeks has felt that family would be better without him  Associations:  intact  Insight:  fair due to understanding his situation and his need for help, having difficulty managing his situation with his mother  Judgment:  fair due to ability to make appropriate decisions for the most part, questionable actions in regards to the situation with his mother, trying to get help with overall depression as he " sees this effecting his functionality  Attention Span:  grossly intact  Concentration:  grossly intact  Recent and Remote Memory:  grossly intact  Fund of Knowledge:  above average       Patient's Strengths & Goals:     Patient stated that Optimism that change can occur, Motivation and readiness for change, Interpersonal relationships and supports (family, friends, peers) and Access to housing/residential stability are personal strengths and/or positive aspects of their life.    Patient stated that their goal(s) for treatment were: get better mentally       Diagnoses:     Principal psychiatric diagnosis:   - Major Depressive Disorder, recurrent, severe, without psychotic features     Secondary psychiatric diagnoses:   - Generalized anxiety disorder with panic  - Alcohol use disorder, severe, in sustained remission     Hospital Course:     Diagnostic Impression:                Jailene Breen is a 55 year old  male with a past psychiatric history of MDD and JEFF who presented to the ED with SI in the context of visit with psychiatrist today and increased psychosocial stressors. Significant symptoms include SI, irritable, depressed and sleep issues. His last psychiatric hospitalization was in 2020 at [facility].  He is currently followed by Slim Sage MD at [facility]. Current psychosocial stressors include loss, trauma, chronic mental health issues, family dynamics, medical issues and financial stress which he has been coping with by withdrawing.  Patient's support system includes family and outpatient team.  Substance use does not appear to be playing a contributing role in the patient's presentation.  There is genetic loading for mood and CD. Medical history does appear significant for worsening chronic pain secondary to autoimmune disorders.  The MSE is notable for passive SI, rambling speech, flattened prosody and frustrated mood. He denies recent self injurious behaviors. His reported symptoms of  depressed mood, impaired sleep, lethargy, hopelessness, anhedonia  are consistent with his historic diagnosis of major depressive disorder.  Optimization of medications to target these symptom clusters in addition to evaluation of adquate outpatient supports will be targets for treatment during this admission.     Prior to Admission Medications   Prescriptions Last Dose Informant Patient Reported? Taking?   acetaminophen (TYLENOL) 500 MG tablet 3/15/2021 at Unknown time  Yes Yes   Sig: Take 1,000 mg by mouth every 6 hours as needed for mild pain (headache)   atenolol (TENORMIN) 25 MG tablet 3/16/2021 at am Self No Yes   Sig: Take 1 tablet (25 mg) by mouth daily   chlorzoxazone (PARAFON FORTE) 500 MG tablet 3/15/2021 at Unknown time Self No Yes   Sig: Take 1 tablet (500 mg) by mouth 3 times daily as needed for muscle spasms   cholecalciferol (VITAMIN D3) 125 mcg (5000 units) capsule 3/16/2021 at am  Yes Yes   Sig: Take 125 mcg by mouth daily   clonazePAM (KLONOPIN) 1 MG tablet 3/15/2021 at hs Self No Yes   Sig: TAKE ONE-HALF - 1 TABLET BY MOUTH ONCE DAILY AS NEEDED FOR ANXIETY   escitalopram (LEXAPRO) 10 MG tablet 3/16/2021 at am Self No Yes   Sig: Take 2 tablets (20 mg) by mouth daily   etanercept (ENBREL SURECLICK) 50 MG/ML autoinjector 3/16/2021 at am Self No Yes   Sig: Inject 50 mg Subcutaneous once a week   fish oil-omega-3 fatty acids 1000 MG capsule 3/16/2021 at am  Yes Yes   Sig: Take 1 g by mouth daily   folic acid (FOLVITE) 1 MG tablet 3/16/2021 at am Self No Yes   Sig: Take 3 tablets (3 mg) by mouth daily   methotrexate sodium 2.5 MG TABS 3/16/2021 at am Self No Yes   Sig: Take 4 tablets (10 mg) by mouth every 7 days   multivitamin (CENTRUM SILVER) tablet 3/16/2021 at am  Yes Yes   Sig: Take 1 tablet by mouth daily   nicotine polacrilex (NICORETTE) 4 MG gum 3/16/2021 at am Self No Yes   Sig: PLACE 1 PIECE INSIDE THE CHEEK AS NEEDED FOR SMOKING CESSATION   omeprazole (PRILOSEC) 20 MG DR capsule 3/16/2021 at  am Self Yes Yes   Sig: Take 20 mg by mouth 2 times daily    oxyCODONE (OXYCONTIN) 10 MG 12 hr tablet 3/15/2021 at am Self No Yes   Sig: Take 1 tablet (10 mg) by mouth every 12 hours Fill 3/2/2021. Begin 3/4/2021   oxyCODONE (ROXICODONE) 5 MG tablet 3/15/2021 at Unknown time Self No Yes   Sig: Take 1 tablet (5 mg) by mouth every 6 hours as needed for severe pain Max of 3 tabs/day.  Okay to dispense 3/8/2021 and start 3/10/2021 30 day script   Patient taking differently: Take 5 mg by mouth every 4 hours as needed for severe pain Max of 3 tabs/day.  Okay to dispense 3/8/2021 and start 3/10/2021 30 day script   traZODone (DESYREL) 50 MG tablet 3/15/2021 at HS Self No Yes   Sig: Take 0.5 tablets (25 mg) by mouth At Bedtime . With additional 0.25 tablet (12.5mg) as needed daily for anxiety.   Patient taking differently: Take 50 mg by mouth At Bedtime       Facility-Administered Medications: None          Psychiatric Course:   Jailene Breen was admitted to station 20 with attending Daniel Pro MD as a voluntary patient, and was treated in the therapeutic milieu with appropriate individual and group therapies. PTA medications continued. Care was coordinated with his outpatient psychiatrist Dr. Slim Sage who offered great insight into Jailene's recent struggles. Loss and previous function and increasing pain seem to be major barriers to optimal mental health. Dr. Sage also have some recommendations he had been considering prior to this admission. One recommendation was starting aripiprazole. Inpatient treatment team agreed with this course.  He was started on augmentation therapy with aripiprazole (Abilify). As stated, patient's main concern was pain management, which will be addressed outpatient with pain management provider Susana Navarrete CNP. She was contacted and offered advice in managing his pain acutely. CTC worked with pain provider to move next appointment to the Tuesday following discharge. Jailene  "tolerated the aripirazole well, he noticed no side effects with the exception of mild sedation the first 2 days though he did not expereince this on the third day. On 3/19 Les stated that he had not experienced SI since admission and while he still has some distress about his pain level, he feels that his mental health was \"better\" and improved since his admission. He was discharged 3/19 with close follow-up as stated int he AVS and below.        Medical Course:   Patient was medically cleared for admission to the unit. No medical issues arose during this hospitalization. Pain mgmt provider was curbsided for recs of treating while inpatient, voltaren gel and lidocaine started.     Consults:   - n/a    Risk Assessment:   Today Jailene Breen reports no SI or HI. In addition, he has notable risk factors for self-harm, including age, anxiety and comorbid medical condition of chronic pain. However, risk is mitigated by commitment to family, history of seeking help when needed and engagement in healthcare. Additional steps taken to minimize risk include: discharging to home with family and close follow-up planned prior to discharge. Therefore, based on all available evidence including the factors cited above, Jailene Breen does not appear to be an imminent danger to self or others and is appropriate for outpatient level of care. However, if patient uses substances or is non-adherent with medication, their risk of decompensation and SI/HI will be elevated. This was discussed with the patient.    This document serves as a transfer of care to Jailene Breen's outpatient providers.       Discharge Plan:     Patient is being discharged to home with the following medications and appointments as detailed below:    Medications:  Added/Changed:  - aripiprazole 2 mg daily    Continued:  - all PTA meds    Discontinued:  - none    Psychiatry Follow-up:      Appointment: Pain Mgmt: Susana Pike PAC : 3/23/21 at 1:00pm (in- " person appt)  [facility]: [id number] [place] [address], [facility]  [phone]     Appointment: Psychiatry: Dr. Slim Sage: 3/29/21 at 9:00am (video appt through [employer]  [facility]: [facility] [address]  [place] [address]  [phone]     Appointment: Therapy: Dr. Isak Cedillo 3/30/21 at 1:00pm (video appt through [employer]  [facility]: [facility] [address]  [place] [address]  [phone]        Appointment: Partial Hospitalization Program: Monday 3/22/21 at 9:00am  [facility], Ground Floor Room [address]  [facility] [place] [address]         Pt seen and discussed with my attending, MD Sury Segovia MD  PGY-1 Psychiatry Resident    45 minutes was used in interviewing and planning this discharge.     Attestation:  I, Daniel Pro, saw and evaluated the patient with the resident physician.  I agree with the findings and plan of care as documented in the resident note.  I have reviewed all labs and vital signs.  I, Daniel Pro, have reviewed this summary and agree with the findings and discharge plan as written.           Appendix A: All Labs This Admission:     Results for orders placed or performed during the hospital encounter of 03/16/21   CBC with platelets differential     Status: None   Result Value Ref Range    WBC 8.3 4.0 - 11.0 10e9/L    RBC Count 4.95 4.4 - 5.9 10e12/L    Hemoglobin 15.0 13.3 - 17.7 g/dL    Hematocrit 44.1 40.0 - 53.0 %    MCV 89 78 - 100 fl    MCH 30.3 26.5 - 33.0 pg    MCHC 34.0 31.5 - 36.5 g/dL    RDW 13.3 10.0 - 15.0 %    Platelet Count 211 150 - 450 10e9/L    Diff Method Automated Method     % Neutrophils 53.5 %    % Lymphocytes 33.0 %    % Monocytes 8.0 %    % Eosinophils 4.3 %    % Basophils 1.0 %    % Immature Granulocytes 0.2 %    Nucleated RBCs 0 0 /100    Absolute Neutrophil 4.4 1.6 - 8.3 10e9/L    Absolute Lymphocytes 2.7  0.8 - 5.3 10e9/L    Absolute Monocytes 0.7 0.0 - 1.3 10e9/L    Absolute Eosinophils 0.4 0.0 - 0.7 10e9/L    Absolute Basophils 0.1 0.0 - 0.2 10e9/L    Abs Immature Granulocytes 0.0 0 - 0.4 10e9/L    Absolute Nucleated RBC 0.0    Comprehensive metabolic panel     Status: Abnormal   Result Value Ref Range    Sodium 142 133 - 144 mmol/L    Potassium 4.1 3.4 - 5.3 mmol/L    Chloride 110 (H) 94 - 109 mmol/L    Carbon Dioxide 27 20 - 32 mmol/L    Anion Gap 5 3 - 14 mmol/L    Glucose 92 70 - 99 mg/dL    Urea Nitrogen 12 7 - 30 mg/dL    Creatinine 1.12 0.66 - 1.25 mg/dL    GFR Estimate 74 >60 mL/min/[1.73_m2]    GFR Estimate If Black 86 >60 mL/min/[1.73_m2]    Calcium 8.4 (L) 8.5 - 10.1 mg/dL    Bilirubin Total 0.6 0.2 - 1.3 mg/dL    Albumin 3.3 (L) 3.4 - 5.0 g/dL    Protein Total 6.7 (L) 6.8 - 8.8 g/dL    Alkaline Phosphatase 91 40 - 150 U/L    ALT 30 0 - 70 U/L    AST 17 0 - 45 U/L   TSH with free T4 reflex and/or T3 as indicated     Status: None   Result Value Ref Range    TSH 2.01 0.40 - 4.00 mU/L   Folate     Status: None   Result Value Ref Range    Folate 17.2 >5.4 ng/mL   Vitamin B12     Status: None   Result Value Ref Range    Vitamin B12 466 193 - 986 pg/mL   [facility] IP Consult (To Assess & Treat)     Status: None ()    Jami Porras LICSW     3/18/2021  3:37 PM  Inpatient Diagnostic Assessment order acknowledged.  Patient is   anticipated to be seen on 3.18.21.     ROLAN Bridges

## 2021-03-19 NOTE — PROGRESS NOTES
At 0002 pt requested and received a PRN of oxycodone 5 mg for lower back pain. Pt was sleeping by 0030. Pt was up at 0417 and asked for and received a PRN of oxycodone 5 mg for lower back pain. Pt sat in the lounge for 30 minutes and then went back to bed. Pt slept for a total of 5 hours this shift.

## 2021-03-19 NOTE — PROGRESS NOTES
[facility]   [facility], [place]  Behavioral Medicine Visit    Patient Name: Jailene Breen    YOB: 1965   Medical Record Number: [id number]  Date: 11/20/2018                SUBJECTIVE: Met with the patient on this date for an elective return appointment.  Today's visit is our Second visit of this calendar year.  Today the patient discusses his pain history and his history of pain treatment.  He also discusses his use of pain medication and his history of use of pain medication.  Finally he discusses his issues with job and disability.  The patient continues to participate in school and is finishing up his Associates degree.  Patient focus on the session was in relationship to his sense of feeling worthless and hopeless and he is particularly frustrated that his pain will not allow him to do the work that he is trying to do.  Patient was not given any assignments to work on from this visit.  We have agreed to meet again in approximately 2 weeks time.        OBJECTIVE: Today the patient reports the following: His pain level is the same, his mood remains the same, his activity level remains the same, his stress is been mildly worse and his sleep remains the same.  Today the patient reports he is involved in self-care activities 2-3 times per week.  Today the patient reports since receiving services at [facility] his overall progress is slightly improved.   Length of Visit: 60 minutes      Assessment: Current Emotional / Mental Status    Appearance:   Appropriate   Eye Contact:   Good   Psychomotor Behavior: Normal   Attitude:   Cooperative   Orientation:   All  Speech  Rate / Production:             Normal   Volume:              Normal   Mood:    Anxious  Depressed   Affect:    Appropriate   Thought Content:  Clear   Thought Form:  Coherent  Logical   Insight:    Poor     ASSESSMENT:   Per patient pain  Problem: INFECTION - ADULT  Goal: Absence or prevention of progression during hospitalization  Description: INTERVENTIONS:  - Assess and monitor for signs and symptoms of infection  - Monitor lab/diagnostic results  - Monitor all insertion sites, i e  indwelling lines, tubes, and drains  - Monitor endotracheal if appropriate and nasal secretions for changes in amount and color  - [place] appropriate cooling/warming therapies per order  - Administer medications as ordered  - Instruct and encourage patient and family to use good hand hygiene technique  - Identify and instruct in appropriate isolation precautions for identified infection/condition  Outcome: Progressing     Problem: Knowledge Deficit  Goal: Patient/family/caregiver demonstrates understanding of disease process, treatment plan, medications, and discharge instructions  Description: Complete learning assessment and assess knowledge base    Interventions:  - Provide teaching at level of understanding  - Provide teaching via preferred learning methods  Outcome: Progressing provider: Chronic right-sided low back pain without sciatica, myofascial pain, rheumatoid arthritis, involving unspecified site, unspecified rheumatoid factor presents.  Ankylosing spondylitis, unspecified site of spine, chronic pain of left knee.    Progress toward goals: fair.    Pain Status: unchanged    Emotional Status: unchanged              Medication / chemical use concerns: None    PLAN:   Next Appointment: Jailene Breen will schedule a follow-up appointment in 2 weeks.  Assignment: None  Objectives / interventions for next session: Continue focusing on pain management and coping skills.    Padilla Keene MA, LP, [facility]  Behavioral Pain Specialist  [facility] [place]

## 2021-03-19 NOTE — DISCHARGE INSTRUCTIONS
Behavioral Discharge Planning and Instructions    Summary:   You were admitted to [facility] on 3/16/21  with worsening depression, thoughts of self harm under the care of Dr. Pro.  You met with Dr. Pro and his team daily for ongoing psychiatric assessment and medication management.  You had opportunities to participate in therapeutic groups on the unit.   At this time you report your mood is stabilizing and you report you are not having thoughts or intent to harm yourself or others. You will be discharged home and will resume care with your outpatient providers.  You have also been referred to the Partial Hospitalization Program    Disposition:  Home    Main Diagnosis:   Major Depressive Disorder    Psychiatry Follow-up:     Appointment: Pain Mgmt: Susana Pike PAC : 3/23/21 at 1:00pm (in- person appt)  [facility]: [address] [place] [address]  [phone]    Appointment: Psychiatry: Dr. Slim Sage: 3/29/21 at 9:00am (video appt through [employer])  [facility]: [facility], 2nd Floor [facility], [address]  [place] [address]  [phone]    Appointment: Therapy: Dr. Isak Cedillo 3/30/21 at 1:00pm (video appt through [employer])  [facility]: [facility], 2nd Floor [facility], [address]  [place] [address]  [phone]      Appointment: Partial Hospitalization Program: Monday 3/22/21 at 9:00am  [facility], Ground Floor Room [address] [address]          Partial Hospitalization Program  You will be scheduled to join the following program: Adult Partial Hospitalization Program  You will be in the follow group /track:  Group ONE  Please attend:  Monday through Friday at:  9a-3p and E/O Tuesday 1p-6p  Your scheduled start of the Program is on: Monday, March 22, 2021       What will happen next?   You will receive a series of calls from our [facility] providers and/or schedulers to prepare you for  your program participation.    1. Admission Call: Program staff will contact you after your diagnostic assessment to provide a brief check in and to complete the admission process. We will ask you about concerns that may need to be addressed right away. This could include: personal safety, insurance clarification, technology access, or another concern you may have. This call may occur days in advance or right before your first scheduled group.    2. Physical Health Screen Call:  A nurse will contact you either prior to admission or during your first week in the program to ask a few required physical health screening questions and discuss concerns as needed.   3. Provider/Scheduling Call: Program staff may also call to schedule an individual appointment with a psychiatrist or psychologist (therapist). This will depend on your needs and may be required for the program that was recommended for you. This program requirement does NOT replace your follow up with your community provider.  However, it is important that you do NOT see your community psychiatric provider on the same day that you see the program psychiatrist. If you do, this could result in a denial from your insurance. Please let us know if you have any concerns and we will help you.     What if I still have questions or cannot attend as planned?  We hope to be able to answer your questions during the admission call. You can also reach out to us by contacting the program directly at:  Partial Hospitalization Program [phone].  Sincerely,   Your Outpatient Adult Mental Health Program Team.      Major Treatments, Procedures and Findings:   Medications were  managed throughout your stay. An internal medicine consult was completed during your stay. You had the opportunity to participate in treatment programming while on the unit including occupational therapy, mental health support and education and spiritual services.     Symptoms to Report:   Please report  if you are experiencing increased aggression and/or confusion, problematic loss of sleep, worsening mood, or thoughts of suicide to your treatment team or notify your primary provider.   IF THE SYMPTOMS YOU ARE EXPERIENCING ARE A MEDICAL EMERGENCY, CALL [phone] IMMEDIATELY    Lifestyle Adjustment:   1. Adjust your lifestyle to get enough sleep, relaxation, exercise and good nutrition.  Continue to develop healthy coping skills to decrease stress and promote a healthy and sober lifestyle.  2. Abstain from all substances of abuse.  3. Take medications as prescribed.  Please work with your doctor to discuss any concerns you have with your medications or side effects you may be experiencing.  4. Follow up with appointments as scheduled.      Resources:       General Medication Instructions:   See your medication sheet(s) for instructions.   Take all medicines as directed.  Make no changes unless your doctor suggests them.   Go to all your doctor visits.  Be sure to have all your required lab tests. This way, your medicines can be refilled on time.  Do not use any drugs not prescribed by your doctor.    Advance Directives:   Scanned document on file with [employer]? No scanned doc  Is document scanned? Pt states no documents  Honoring Choices Your Rights Handout: Informed and given  Was more information offered? Materials given    The Treatment team has appreciated the opportunity to work with you. If you have any questions or concerns about your recent admission, you can contact the unit which can receive your call 24 hours a day, 7 days a week. They will be able to get in touch with a Provider if needed. The unit number is [phone]

## 2021-03-20 ENCOUNTER — HOSPITAL ENCOUNTER (INPATIENT)
Facility: CLINIC | Age: 56
LOS: 6 days | Discharge: HOME | End: 2021-03-27
Attending: EMERGENCY MEDICINE | Admitting: PSYCHIATRY & NEUROLOGY
Payer: COMMERCIAL

## 2021-03-20 DIAGNOSIS — F32.A DEPRESSION, UNSPECIFIED DEPRESSION TYPE: ICD-10-CM

## 2021-03-20 DIAGNOSIS — Z11.52 ENCOUNTER FOR SCREENING LABORATORY TESTING FOR SEVERE ACUTE RESPIRATORY SYNDROME CORONAVIRUS 2 (SARS-COV-2): ICD-10-CM

## 2021-03-20 DIAGNOSIS — M54.6 RIGHT-SIDED THORACIC BACK PAIN, UNSPECIFIED CHRONICITY: Primary | ICD-10-CM

## 2021-03-20 DIAGNOSIS — F51.05 INSOMNIA DUE TO OTHER MENTAL DISORDER: ICD-10-CM

## 2021-03-20 DIAGNOSIS — F41.1 GENERALIZED ANXIETY DISORDER: ICD-10-CM

## 2021-03-20 DIAGNOSIS — F10.10 ALCOHOL ABUSE, CONTINUOUS: ICD-10-CM

## 2021-03-20 DIAGNOSIS — F99 INSOMNIA DUE TO OTHER MENTAL DISORDER: ICD-10-CM

## 2021-03-20 DIAGNOSIS — R45.851 SUICIDE IDEATION: ICD-10-CM

## 2021-03-20 PROCEDURE — 99285 EMERGENCY DEPT VISIT HI MDM: CPT | Performed by: EMERGENCY MEDICINE

## 2021-03-20 PROCEDURE — 99285 EMERGENCY DEPT VISIT HI MDM: CPT | Mod: 25 | Performed by: EMERGENCY MEDICINE

## 2021-03-21 ENCOUNTER — MYC MEDICAL ADVICE (OUTPATIENT)
Dept: PALLIATIVE MEDICINE | Facility: CLINIC | Age: 56
End: 2021-03-21

## 2021-03-21 ENCOUNTER — TELEPHONE (OUTPATIENT)
Dept: BEHAVIORAL HEALTH | Facility: CLINIC | Age: 56
End: 2021-03-21

## 2021-03-21 PROBLEM — F32.A DEPRESSION, UNSPECIFIED DEPRESSION TYPE: Status: ACTIVE | Noted: 2021-03-21

## 2021-03-21 PROBLEM — R45.851 SUICIDE IDEATION: Status: ACTIVE | Noted: 2021-03-21

## 2021-03-21 LAB
ALBUMIN SERPL-MCNC: 3.5 G/DL (ref 3.4–5)
ALP SERPL-CCNC: 100 U/L (ref 40–150)
ALT SERPL W P-5'-P-CCNC: 34 U/L (ref 0–70)
AMPHETAMINES UR QL SCN: NEGATIVE
ANION GAP SERPL CALCULATED.3IONS-SCNC: 4 MMOL/L (ref 3–14)
AST SERPL W P-5'-P-CCNC: 20 U/L (ref 0–45)
BARBITURATES UR QL: NEGATIVE
BASOPHILS # BLD AUTO: 0.1 10E9/L (ref 0–0.2)
BASOPHILS NFR BLD AUTO: 1 %
BENZODIAZ UR QL: NEGATIVE
BILIRUB SERPL-MCNC: 0.7 MG/DL (ref 0.2–1.3)
BUN SERPL-MCNC: 12 MG/DL (ref 7–30)
CALCIUM SERPL-MCNC: 8.3 MG/DL (ref 8.5–10.1)
CANNABINOIDS UR QL SCN: NEGATIVE
CHLORIDE SERPL-SCNC: 108 MMOL/L (ref 94–109)
CO2 SERPL-SCNC: 29 MMOL/L (ref 20–32)
COCAINE UR QL: NEGATIVE
CREAT SERPL-MCNC: 0.92 MG/DL (ref 0.66–1.25)
DIFFERENTIAL METHOD BLD: NORMAL
EOSINOPHIL # BLD AUTO: 0.3 10E9/L (ref 0–0.7)
EOSINOPHIL NFR BLD AUTO: 4.2 %
ERYTHROCYTE [DISTWIDTH] IN BLOOD BY AUTOMATED COUNT: 13.1 % (ref 10–15)
ETHANOL UR QL SCN: NEGATIVE
FLUAV RNA RESP QL NAA+PROBE: NEGATIVE
FLUBV RNA RESP QL NAA+PROBE: NEGATIVE
GFR SERPL CREATININE-BSD FRML MDRD: >90 ML/MIN/{1.73_M2}
GLUCOSE SERPL-MCNC: 95 MG/DL (ref 70–99)
HCT VFR BLD AUTO: 45.7 % (ref 40–53)
HGB BLD-MCNC: 15.6 G/DL (ref 13.3–17.7)
IMM GRANULOCYTES # BLD: 0 10E9/L (ref 0–0.4)
IMM GRANULOCYTES NFR BLD: 0.3 %
LABORATORY COMMENT REPORT: NORMAL
LYMPHOCYTES # BLD AUTO: 2.2 10E9/L (ref 0.8–5.3)
LYMPHOCYTES NFR BLD AUTO: 31.9 %
MCH RBC QN AUTO: 30.1 PG (ref 26.5–33)
MCHC RBC AUTO-ENTMCNC: 34.1 G/DL (ref 31.5–36.5)
MCV RBC AUTO: 88 FL (ref 78–100)
MONOCYTES # BLD AUTO: 0.6 10E9/L (ref 0–1.3)
MONOCYTES NFR BLD AUTO: 8.9 %
NEUTROPHILS # BLD AUTO: 3.7 10E9/L (ref 1.6–8.3)
NEUTROPHILS NFR BLD AUTO: 53.7 %
NRBC # BLD AUTO: 0 10*3/UL
NRBC BLD AUTO-RTO: 0 /100
OPIATES UR QL SCN: NEGATIVE
PLATELET # BLD AUTO: 246 10E9/L (ref 150–450)
POTASSIUM SERPL-SCNC: 4 MMOL/L (ref 3.4–5.3)
PROT SERPL-MCNC: 7.2 G/DL (ref 6.8–8.8)
RBC # BLD AUTO: 5.18 10E12/L (ref 4.4–5.9)
RSV RNA SPEC QL NAA+PROBE: NORMAL
SARS-COV-2 RNA RESP QL NAA+PROBE: NEGATIVE
SODIUM SERPL-SCNC: 141 MMOL/L (ref 133–144)
SPECIMEN SOURCE: NORMAL
TSH SERPL DL<=0.005 MIU/L-ACNC: 2.39 MU/L (ref 0.4–4)
WBC # BLD AUTO: 6.8 10E9/L (ref 4–11)

## 2021-03-21 PROCEDURE — C9803 HOPD COVID-19 SPEC COLLECT: HCPCS | Performed by: EMERGENCY MEDICINE

## 2021-03-21 PROCEDURE — 90791 PSYCH DIAGNOSTIC EVALUATION: CPT

## 2021-03-21 PROCEDURE — 80307 DRUG TEST PRSMV CHEM ANLYZR: CPT | Performed by: EMERGENCY MEDICINE

## 2021-03-21 PROCEDURE — 85025 COMPLETE CBC W/AUTO DIFF WBC: CPT | Performed by: FAMILY MEDICINE

## 2021-03-21 PROCEDURE — 250N000013 HC RX MED GY IP 250 OP 250 PS 637: Performed by: NURSE PRACTITIONER

## 2021-03-21 PROCEDURE — 250N000013 HC RX MED GY IP 250 OP 250 PS 637: Performed by: EMERGENCY MEDICINE

## 2021-03-21 PROCEDURE — 84443 ASSAY THYROID STIM HORMONE: CPT | Performed by: FAMILY MEDICINE

## 2021-03-21 PROCEDURE — 99207 PR CDG-MDM COMPONENT: MEETS MODERATE - DOWN CODED: CPT | Performed by: NURSE PRACTITIONER

## 2021-03-21 PROCEDURE — 80053 COMPREHEN METABOLIC PANEL: CPT | Performed by: FAMILY MEDICINE

## 2021-03-21 PROCEDURE — 87636 SARSCOV2 & INF A&B AMP PRB: CPT | Performed by: EMERGENCY MEDICINE

## 2021-03-21 PROCEDURE — 80320 DRUG SCREEN QUANTALCOHOLS: CPT | Performed by: EMERGENCY MEDICINE

## 2021-03-21 PROCEDURE — 124N000003 HC R&B MH SENIOR/ADOLESCENT

## 2021-03-21 PROCEDURE — 99222 1ST HOSP IP/OBS MODERATE 55: CPT | Mod: AI | Performed by: NURSE PRACTITIONER

## 2021-03-21 RX ORDER — CHLORZOXAZONE 500 MG/1
500 TABLET ORAL 3 TIMES DAILY PRN
Status: DISCONTINUED | OUTPATIENT
Start: 2021-03-21 | End: 2021-03-27 | Stop reason: HOSPADM

## 2021-03-21 RX ORDER — NALOXONE HYDROCHLORIDE 0.4 MG/ML
0.2 INJECTION, SOLUTION INTRAMUSCULAR; INTRAVENOUS; SUBCUTANEOUS
Status: DISCONTINUED | OUTPATIENT
Start: 2021-03-21 | End: 2021-03-27 | Stop reason: HOSPADM

## 2021-03-21 RX ORDER — NALOXONE HYDROCHLORIDE 0.4 MG/ML
0.4 INJECTION, SOLUTION INTRAMUSCULAR; INTRAVENOUS; SUBCUTANEOUS
Status: DISCONTINUED | OUTPATIENT
Start: 2021-03-21 | End: 2021-03-27 | Stop reason: HOSPADM

## 2021-03-21 RX ORDER — FOLIC ACID 1 MG/1
3 TABLET ORAL DAILY
Status: DISCONTINUED | OUTPATIENT
Start: 2021-03-21 | End: 2021-03-22

## 2021-03-21 RX ORDER — OXYCODONE HYDROCHLORIDE 5 MG/1
10 TABLET ORAL ONCE
Status: COMPLETED | OUTPATIENT
Start: 2021-03-21 | End: 2021-03-21

## 2021-03-21 RX ORDER — OXYCODONE HYDROCHLORIDE 5 MG/1
5 TABLET ORAL EVERY 4 HOURS PRN
Status: DISCONTINUED | OUTPATIENT
Start: 2021-03-21 | End: 2021-03-24

## 2021-03-21 RX ORDER — OXYCODONE HCL 10 MG/1
10 TABLET, FILM COATED, EXTENDED RELEASE ORAL EVERY 12 HOURS
Status: DISCONTINUED | OUTPATIENT
Start: 2021-03-21 | End: 2021-03-21

## 2021-03-21 RX ORDER — LIDOCAINE 50 MG/G
OINTMENT TOPICAL EVERY 4 HOURS PRN
Status: DISCONTINUED | OUTPATIENT
Start: 2021-03-21 | End: 2021-03-23

## 2021-03-21 RX ORDER — OXYCODONE HYDROCHLORIDE 5 MG/1
5 TABLET ORAL ONCE
Status: COMPLETED | OUTPATIENT
Start: 2021-03-21 | End: 2021-03-21

## 2021-03-21 RX ORDER — ATENOLOL 25 MG/1
25 TABLET ORAL DAILY
Status: DISCONTINUED | OUTPATIENT
Start: 2021-03-21 | End: 2021-03-27 | Stop reason: HOSPADM

## 2021-03-21 RX ORDER — OXYCODONE HCL 10 MG/1
10 TABLET, FILM COATED, EXTENDED RELEASE ORAL ONCE
Status: COMPLETED | OUTPATIENT
Start: 2021-03-21 | End: 2021-03-21

## 2021-03-21 RX ORDER — OXYCODONE HCL 10 MG/1
10 TABLET, FILM COATED, EXTENDED RELEASE ORAL EVERY 12 HOURS
Status: DISCONTINUED | OUTPATIENT
Start: 2021-03-21 | End: 2021-03-25

## 2021-03-21 RX ORDER — LANOLIN ALCOHOL/MO/W.PET/CERES
3 CREAM (GRAM) TOPICAL AT BEDTIME
Status: DISCONTINUED | OUTPATIENT
Start: 2021-03-21 | End: 2021-03-27 | Stop reason: HOSPADM

## 2021-03-21 RX ORDER — MULTIPLE VITAMINS W/ MINERALS TAB 9MG-400MCG
1 TAB ORAL DAILY
Status: DISCONTINUED | OUTPATIENT
Start: 2021-03-21 | End: 2021-03-27 | Stop reason: HOSPADM

## 2021-03-21 RX ORDER — CHLORAL HYDRATE 500 MG
1 CAPSULE ORAL DAILY
Status: DISCONTINUED | OUTPATIENT
Start: 2021-03-21 | End: 2021-03-27 | Stop reason: HOSPADM

## 2021-03-21 RX ORDER — ESCITALOPRAM OXALATE 20 MG/1
20 TABLET ORAL DAILY
Status: DISCONTINUED | OUTPATIENT
Start: 2021-03-21 | End: 2021-03-22

## 2021-03-21 RX ORDER — CLONAZEPAM 0.5 MG/1
.5-1 TABLET ORAL DAILY PRN
Status: DISCONTINUED | OUTPATIENT
Start: 2021-03-21 | End: 2021-03-27 | Stop reason: HOSPADM

## 2021-03-21 RX ORDER — ACETAMINOPHEN 500 MG
1000 TABLET ORAL EVERY 6 HOURS PRN
Status: DISCONTINUED | OUTPATIENT
Start: 2021-03-21 | End: 2021-03-27 | Stop reason: HOSPADM

## 2021-03-21 RX ADMIN — OXYCODONE HYDROCHLORIDE 10 MG: 10 TABLET, FILM COATED, EXTENDED RELEASE ORAL at 03:55

## 2021-03-21 RX ADMIN — NICOTINE POLACRILEX 2 MG: 2 GUM, CHEWING BUCCAL at 08:09

## 2021-03-21 RX ADMIN — NICOTINE POLACRILEX 2 MG: 2 GUM, CHEWING BUCCAL at 13:27

## 2021-03-21 RX ADMIN — DICLOFENAC SODIUM 2 G: 10 GEL TOPICAL at 21:23

## 2021-03-21 RX ADMIN — Medication 125 MCG: at 07:54

## 2021-03-21 RX ADMIN — NICOTINE POLACRILEX 4 MG: 2 GUM, CHEWING BUCCAL at 17:48

## 2021-03-21 RX ADMIN — Medication 2 MG: at 07:54

## 2021-03-21 RX ADMIN — OXYCODONE HYDROCHLORIDE 5 MG: 5 TABLET ORAL at 08:02

## 2021-03-21 RX ADMIN — OXYCODONE HYDROCHLORIDE 5 MG: 5 TABLET ORAL at 17:48

## 2021-03-21 RX ADMIN — OXYCODONE HYDROCHLORIDE 10 MG: 5 TABLET ORAL at 12:41

## 2021-03-21 RX ADMIN — FOLIC ACID 3 MG: 1 TABLET ORAL at 07:54

## 2021-03-21 RX ADMIN — NICOTINE POLACRILEX 4 MG: 2 GUM, CHEWING BUCCAL at 21:22

## 2021-03-21 RX ADMIN — OXYCODONE HYDROCHLORIDE 10 MG: 10 TABLET, FILM COATED, EXTENDED RELEASE ORAL at 20:25

## 2021-03-21 RX ADMIN — OMEPRAZOLE 20 MG: 20 CAPSULE, DELAYED RELEASE ORAL at 12:41

## 2021-03-21 RX ADMIN — MULTIPLE VITAMINS W/ MINERALS TAB 1 TABLET: TAB at 07:54

## 2021-03-21 RX ADMIN — OMEPRAZOLE 20 MG: 20 CAPSULE, DELAYED RELEASE ORAL at 07:54

## 2021-03-21 RX ADMIN — Medication 1 G: at 12:41

## 2021-03-21 RX ADMIN — Medication 25 MG: at 20:29

## 2021-03-21 RX ADMIN — ESCITALOPRAM OXALATE 20 MG: 20 TABLET ORAL at 07:54

## 2021-03-21 RX ADMIN — NICOTINE POLACRILEX 2 MG: 2 GUM, CHEWING BUCCAL at 11:34

## 2021-03-21 RX ADMIN — MELATONIN TAB 3 MG 3 MG: 3 TAB at 20:30

## 2021-03-21 RX ADMIN — ATENOLOL 25 MG: 25 TABLET ORAL at 07:54

## 2021-03-21 RX ADMIN — DICLOFENAC SODIUM 2 G: 10 GEL TOPICAL at 17:46

## 2021-03-21 RX ADMIN — NICOTINE POLACRILEX 4 MG: 2 GUM, CHEWING BUCCAL at 20:25

## 2021-03-21 RX ADMIN — DICLOFENAC SODIUM 2 G: 10 GEL TOPICAL at 13:53

## 2021-03-21 RX ADMIN — OXYCODONE HYDROCHLORIDE 5 MG: 5 TABLET ORAL at 01:53

## 2021-03-21 ASSESSMENT — ACTIVITIES OF DAILY LIVING (ADL)
HEARING_DIFFICULTY_OR_DEAF: NO
WALKING_OR_CLIMBING_STAIRS_DIFFICULTY: NO
CONCENTRATING,_REMEMBERING_OR_MAKING_DECISIONS_DIFFICULTY: NO
EQUIPMENT_CURRENTLY_USED_AT_HOME: CANE, STRAIGHT;WALKER, ROLLING
FALL_HISTORY_WITHIN_LAST_SIX_MONTHS: NO
TOILETING_ISSUES: NO
DRESS: INDEPENDENT
DIFFICULTY_EATING/SWALLOWING: NO
ORAL_HYGIENE: INDEPENDENT
HYGIENE/GROOMING: INDEPENDENT
DOING_ERRANDS_INDEPENDENTLY_DIFFICULTY: NO
WEAR_GLASSES_OR_BLIND: NO
DIFFICULTY_COMMUNICATING: NO
DRESSING/BATHING_DIFFICULTY: NO
LAUNDRY: WITH SUPERVISION

## 2021-03-21 ASSESSMENT — ENCOUNTER SYMPTOMS
ABDOMINAL PAIN: 0
DYSURIA: 0
WEAKNESS: 0
DYSPHORIC MOOD: 1
RHINORRHEA: 0
SHORTNESS OF BREATH: 0
NERVOUS/ANXIOUS: 1
FEVER: 0
BACK PAIN: 1
BRUISES/BLEEDS EASILY: 0

## 2021-03-21 ASSESSMENT — MIFFLIN-ST. JEOR: SCORE: 2180.28
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
## 2021-03-21 NOTE — TELEPHONE ENCOUNTER
"Patient cleared and ready for behavioral bed placement: Yes     S: 56 y/o male presented to the [facility] ED with SI.    B: Hx MDD, anxiety, ankylosing spondylitis, chronic pain,panic attacks.  Pt was admitted to [facility] on 3/16/21 due to worsening suicidal thoughts and was discharged on 3/19/21.  Pt reported thoughts of cutting his wrist and stated the thoughts are worse than before his last admission.  Pt was started on Abilify while admitted and initially felt fine but now feels as though he is \"looking out a different set of eyes.\"  Hx of attempted hanging 6 years ago.  Denied hallucinations and stated it is his subconscious telling him he's \"worthless.\"  Pt take oxycodone and oxycontin for pain.    A: Voluntary  No acute medical concerns.  COVID in process.  Drug screen negative.    R: Identifying placement options.  "

## 2021-03-21 NOTE — ED NOTES
"ED to Behavioral Floor Handoff    SITUATION  Jailene Breen is a 55 year old male who speaks English and lives in a home with family members The patient arrived in the ED by private car from home with a complaint of Suicidal (Increasing SI over last few days since being discharged from [facility]. Feels like he is disassociating \"I'm looking through someone else's eyes, my voice doesn't seem like mine.\" Denies specific plan. )  .The patient's current symptoms started/worsened 1 day(s) ago and during this time the symptoms have increased.   In the ED, pt was diagnosed with   Final diagnoses:   Depression, unspecified depression type   Suicide ideation        Initial vitals were: BP: (!) 142/87  Pulse: 72  Temp: 98.8  F (37.1  C)  Resp: 16  Weight: 123.4 kg (272 lb)  SpO2: 98 %   --------  Is the patient diabetic? No   If yes, last blood glucose? --     If yes, was this treated in the ED? --  --------  Is the patient inebriated (ETOH) No or Impaired on other substances? No  MSSA done? N/A  Last MSSA score: --    Were withdrawal symptoms treated? N/A  Does the patient have a seizure history? No. If yes, date of most recent seizure--  --------  Is the patient patient experiencing suicidal ideation? reports suicidal ideation with out intention or a suicidal plan    Homicidal ideation? denies current or recent homicidal ideation or behaviors.    Self-injurious behavior/urges? denies current or recent self injurious behavior or ideation.  ------  Was pt aggressive in the ED No  Was a code called No  Is the pt now cooperative? Yes  -------  Meds given in ED:   Medications   ARIPiprazole (ABILIFY) tablet 2 mg (2 mg Oral Given 3/21/21 0754)   atenolol (TENORMIN) tablet 25 mg (25 mg Oral Given 3/21/21 0754)   escitalopram (LEXAPRO) tablet 20 mg (20 mg Oral Given 3/21/21 0754)   folic acid (FOLVITE) tablet 3 mg (3 mg Oral Given 3/21/21 0754)   oxyCODONE (ROXICODONE) tablet 5 mg (5 mg Oral Given 3/21/21 0802)   oxyCODONE " "(oxyCONTIN) 12 hr tablet 10 mg (has no administration in time range)   multivitamin w/minerals (THERA-VIT-M) tablet 1 tablet (1 tablet Oral Given 3/21/21 0754)   cholecalciferol (VITAMIN D3) 125 mcg (5000 units) capsule 125 mcg (125 mcg Oral Given 3/21/21 0754)   omeprazole (priLOSEC) CR capsule 20 mg (20 mg Oral Given 3/21/21 0754)   chlorzoxazone (PARAFON FORTE) tablet 500 mg (has no administration in time range)   nicotine (NICORETTE) gum 2 mg (2 mg Buccal Given 3/21/21 0809)   oxyCODONE (ROXICODONE) tablet 5 mg (5 mg Oral Given 3/21/21 0153)   oxyCODONE (oxyCONTIN) 12 hr tablet 10 mg (10 mg Oral Given 3/21/21 1805)      Family present during ED course? No  Family currently present? No    BACKGROUND  Does the patient have a cognitive impairment or developmental disability? No  Allergies:   Allergies   Allergen Reactions     Mirtazapine      Other reaction(s): Coma     Hydrocodone Itching     Ms Contin [Morphine] Itching     Remeron Soltab Other (See Comments)     \"Blacked out\" for one week   .   Social demographics are   Social History     Socioeconomic History     Marital status:      Spouse name: None     Number of children: None     Years of education: None     Highest education level: None   Occupational History     None   Social Needs     Financial resource strain: None     Food insecurity     Worry: None     Inability: None     Transportation needs     Medical: None     Non-medical: None   Tobacco Use     Smoking status: Never Smoker     Smokeless tobacco: Former User     Types: Chew     Tobacco comment: Chew   Substance and Sexual Activity     Alcohol use: No     Comment: none     Drug use: No     Sexual activity: Yes     Partners: Female     Comment: decreased with Prozac   Lifestyle     Physical activity     Days per week: None     Minutes per session: None     Stress: None   Relationships     Social connections     Talks on phone: None     Gets together: None     Attends Denominational service: None "     Active member of club or organization: None     Attends meetings of clubs or organizations: None     Relationship status: None     Intimate partner violence     Fear of current or ex partner: None     Emotionally abused: None     Physically abused: None     Forced sexual activity: None   Other Topics Concern     Parent/sibling w/ CABG, MI or angioplasty before 65F 55M? Not Asked   Social History Narrative     None        ASSESSMENT  Labs results   Labs Ordered and Resulted from Time of ED Arrival Up to the Time of Departure from the ED   COMPREHENSIVE METABOLIC PANEL - Abnormal; Notable for the following components:       Result Value    Calcium 8.3 (*)     All other components within normal limits   DRUG ABUSE SCREEN 6 CHEM DEP URINE ([facility])   INFLUENZA A/B & SARS-COV2 PCR MULTIPLEX   CBC WITH PLATELETS DIFFERENTIAL   TSH WITH FREE T4 REFLEX      Imaging Studies: No results found for this or any previous visit (from the past 24 hour(s)).   Most recent vital signs /75   Pulse 67   Temp 98.8  F (37.1  C) (Oral)   Resp 16   Wt 123.4 kg (272 lb)   SpO2 95%   BMI 33.11 kg/m     Abnormal labs/tests/findings requiring intervention:---   Pain control: fair  Nausea control: pt had none    RECOMMENDATION  Are any infection precautions needed (MRSA, VRE, etc.)? No If yes, what infection? --  ---  Does the patient have mobility issues? independently. If yes, what device does the pt use? ---  ---  Is patient on 72 hour hold or commitment? No If on 72 hour hold, have hold and rights been given to patient? N/A  Are admitting orders written if after 10 p.m. ?N/A  Tasks needing to be completed:---     Olga George RN   [facility]--    [phone] [facility]   [phone] [facility]

## 2021-03-21 NOTE — SAFE
Jailene Breen  March 21, 2021    Pt is a 55 year-old white male who came to the ED with his wife. Pt has a history of Depression, Anxiety, and Alcohol use disorder. He has been admitted for mental health at least twice; last discharged on 3/19/21. He has been in substance abuse treatment once (2004), and denies any current use.     Pt reports he has been feeling worse the past couple of days with increased thoughts of killing himself, and feeling like he is looking through someone else's eyes. Pt reported that he has thought about cutting his wrist when he was using a razor to cut something else. He also thought about going to the liquor store tomorrow and then getting drunk, as he knows he could never harm/kill himself when he is sober. Pt denied that he would do anything to try to harm/kill himself or others in the hospital.     Current Suicidal Ideation/Self-Injurious Concerns/Methods: Cutting and Other Unknown    Inappropriate Sexual Behavior: No    Aggression/Homicidal Ideation: None - N/A      For additional details see full DEC assessment.       Hilary Obregon

## 2021-03-21 NOTE — PHARMACY-ADMISSION MEDICATION HISTORY
Admission Medication History status for the 3/20/2021 admission is complete.  See EPIC admission navigator for Prior to Admission medications.    Medication history sources:  patient and Surescripts    Medication history source reliability: Good    Medication adherence:  Good     Per [place] :  Clonazepam 1 mg - Last filled 3/15/21 for #30 (30 day supply)  Oxycodone 5 mg tablet - Last filled 3/8/21 for #90 (30 day supply)  Oyxcodone ER 10 mg tablet - Last filled 3/1/21 for #60 (30 day supply)     Changes made to PTA medication list (reason)  Added: N/A  Deleted: N/A  Changed:   1. Prilosec 20mg po bid changed to qam.     Additional medication history information (including reliability of information, actions taken by pharmacist): None    Time spent in this activity: 15 minutes    Medication history completed by: Adela Pruitt.D     Prior to Admission medications    Medication Sig Last Dose Taking? Auth Provider   ARIPiprazole (ABILIFY) 2 MG tablet Take 1 tablet (2 mg) by mouth daily 3/20/2021 at Unknown time Yes Sury Thomas MD   atenolol (TENORMIN) 25 MG tablet Take 1 tablet (25 mg) by mouth daily 3/20/2021 at Unknown time Yes Aiden Resendez PA   chlorzoxazone (PARAFON FORTE) 500 MG tablet Take 1 tablet (500 mg) by mouth 3 times daily as needed for muscle spasms 3/20/2021 at Unknown time Yes Susana Pike APRN CNP   cholecalciferol (VITAMIN D3) 125 mcg (5000 units) capsule Take 125 mcg by mouth daily 3/20/2021 at Unknown time Yes Unknown, Entered By History   clonazePAM (KLONOPIN) 1 MG tablet TAKE ONE-HALF - 1 TABLET BY MOUTH ONCE DAILY AS NEEDED FOR ANXIETY 3/20/2021 at Unknown time Yes Aiden Resendez PA   escitalopram (LEXAPRO) 10 MG tablet Take 2 tablets (20 mg) by mouth daily 3/20/2021 at Unknown time Yes Coffin, Richard Breyen, MD   etanercept (ENBREL SURECLICK) 50 MG/ML autoinjector Inject 50 mg Subcutaneous once a week  Patient taking differently: Inject 50 mg  Subcutaneous once a week Every Tuesday Past Week at Unknown time Yes Raghu Parada MD   fish oil-omega-3 fatty acids 1000 MG capsule Take 1 g by mouth daily 3/20/2021 at Unknown time Yes Unknown, Entered By History   folic acid (FOLVITE) 1 MG tablet Take 3 tablets (3 mg) by mouth daily 3/20/2021 at Unknown time Yes Raghu Parada MD   melatonin 3 MG tablet Take 1 tablet (3 mg) by mouth At Bedtime Past Week at Unknown time Yes Sury Thomas MD   methotrexate sodium 2.5 MG TABS Take 4 tablets (10 mg) by mouth every 7 days  Patient taking differently: Take 4 tablets by mouth every 7 days Every tuesday Past Week at Unknown time Yes Raghu Parada MD   multivitamin (CENTRUM SILVER) tablet Take 1 tablet by mouth daily 3/20/2021 at Unknown time Yes Unknown, Entered By History   nicotine polacrilex (NICORETTE) 4 MG gum PLACE 1 PIECE INSIDE THE CHEEK AS NEEDED FOR SMOKING CESSATION 3/20/2021 at Unknown time Yes Aiden Resendez PA   omeprazole (PRILOSEC) 20 MG DR capsule Take 20 mg by mouth daily  3/21/2021 at Unknown time Yes Unknown, Entered By History   oxyCODONE (OXYCONTIN) 10 MG 12 hr tablet Take 1 tablet (10 mg) by mouth every 12 hours Fill 3/2/2021. Begin 3/4/2021 3/20/2021 at Unknown time Yes Susana Pike APRN CNP   oxyCODONE (ROXICODONE) 5 MG tablet Take 1 tablet (5 mg) by mouth every 6 hours as needed for severe pain Max of 3 tabs/day.  Okay to dispense 3/8/2021 and start 3/10/2021 30 day script  Patient taking differently: Take 5 mg by mouth every 4 hours as needed for severe pain Max of 3 tabs/day.  Okay to dispense 3/8/2021 and start 3/10/2021 30 day script 3/20/2021 at Unknown time Yes Susana Pike APRN CNP   traZODone (DESYREL) 50 MG tablet Take 0.5 tablets (25 mg) by mouth At Bedtime . With additional 0.25 tablet (12.5mg) as needed daily for anxiety.  Patient taking differently: Take 50 mg by mouth At Bedtime  Past Week at Unknown time Yes Coffin, Richard Breyen,  MD   acetaminophen (TYLENOL) 500 MG tablet Take 1,000 mg by mouth every 6 hours as needed for mild pain (headache) Unknown at Unknown time  Unknown, Entered By History   diclofenac (VOLTAREN) 1 % topical gel Apply 2 g topically 4 times daily Unknown at Unknown time  Sury Thomas MD   lidocaine (XYLOCAINE) 5 % external ointment Apply topically every 4 hours as needed for moderate pain Unknown at Unknown time  Sury Thomas MD

## 2021-03-21 NOTE — PLAN OF CARE
"  Problem: Depressive Symptoms  Goal: Depressive Symptoms  Description: Signs and symptoms of listed problems will be absent or manageable.  3/21/2021 1148 by Nissa Bales RN  Outcome: No Change  Flowsheets (Taken 3/21/2021 1148)  Depressive Symptoms Assessed: all  Depressive Symptoms Present:    suicidality    affect    mood    anxiety    insight  ADMISSION NOTE  /82   Pulse 75   Temp 97.3  F (36.3  C) (Temporal)   Resp 16   Ht 1.93 m (6' 4\")   Wt 123.4 kg (272 lb)   SpO2 98%   BMI 33.11 kg/m        Pt is a 55 year old male admitted on a voluntary status to [facility] from the ED . He presented to the ED the night of 3/20/21 with increased depression and suicidal ideation. He was recently discharged from [facility] in the [facility] on 3/19/21. He has established diagnoses of MDD, Anxiety, and Panic attacks. He has been to [facility] x [facility]. Last treatment in 2004. His medical conditions include HTN, RA, Ankylosing spondylitis and chronic back pain. This is his second [facility] admission.       On arrival to the unit, he is pleasant and cooperative, appears to perseverate on pain issues and medications, \"My depression will be better if my pain is well managed\" and requesting writer updates the on- call provider for stronger pain medications. He reports his depression \"suddenly got worse yesterday at noon without any specific triggers. He indicates \"feelings of worthlessness, my subconscious tells me am worthless and my racing thoughts are out of control\" He is endorsing current depression and anxiety, \"they are off the scales\". He denies suicidal ideations, \"just thoughts\" I can't come up with a plan unless I am drunk and I can't do that to my wife and kids.\" He has a hx of a SA via hanging on a motel ceiling fan in 2014. He reports hx of SIB with last incident 15 years ago. He denies A/VH.     Admission profile completed. Labs completed in ED and unremarkable. COVID results and Utox screen was negative. Pt has been " oriented to unit. Except for his chronic pain, he denies any acute concerns. No behavior or safety concerns noted.       He reports he uses a cane as an assistive device for ambulation at home.

## 2021-03-21 NOTE — ED PROVIDER NOTES
"    [facility] EMERGENCY DEPARTMENT ([facility])   2021 [address]  History     Chief Complaint   Patient presents with     Suicidal     Increasing SI over last few days since being discharged from [facility]. Feels like he is disassociating \"I'm looking through someone else's eyes, my voice doesn't seem like mine.\" Denies specific plan.      The history is provided by the patient and medical records.     Jailene Breen is a 55 year old male with history of ankylosing spondylitis, rheumatoid arthritis, major depressive disorder and generalized anxiety disorder who presents with worsening depression and suicidal ideation.  He had recent hospitalization from 3/16-3/19/2021 for suicidal ideation.  Patient has been having worsening suicidal ideation with plan to crash his car into a tree.  He was on [facility], states that this hospitalization went well.  He was started on Abilify during that hospitalization and then was discharged.  He states that he had just gotten his third dose of Abilify today.  On Friday (2 days ago, 3/19/2021) things were going well until noon until he started feeling like he was looking out a different set of eyes.  He states that he started feeling worse, feeling anxious as if his subconscious was telling him how worthless he is.  He has been feeling depressed and wants to hurt himself.  He called [facility] regarding this worsening depression and suicidal ideation and was instructed to return to the Emergency Department.  Patient states that normally he is a calm man but these thoughts are worse, feels his mind is racing, feels that his brain is \"trying to figure out a way to hurt myself without hurting myself.\"  When asked what this means, patient states that he wants to go to sleep and not wake up. He had suicide attempt 6 years ago when he tried to hang himself from a ceiling fan but it broke.  Here he is tearful, states that he did not cry at sister's or father's  but is " crying here, that something doesn't feel right. He is confused, states that he loves his family, wife and daughter, 2 dogs and does not understand why he is thinking and feeling this way. Patient has been feeling very anxious with this, felt that he was at a point where he needed to take Klonopin.  He states that he is unable to take his chronic pain medications if he takes Klonopin, made the choice to take the Klonopin instead but now has marked back pain.  He would like pain medications at this time, states that he is on OxyContin twice a day (9 AM and 9 PM), oxycodone PRN, and generally takes them together to help control his pain. No thoughts of harming others. No alcohol or drug use.  He follows with a therapist. Patient received his COVID-19 vaccine today.    PAST MEDICAL HISTORY:   Past Medical History:   Diagnosis Date     Ankylosing spondylitis (H) 3/1/2017     Anxiety      Arthritis     back, knees     Back pain     possible drug seeking behavior in the past.      Hypertension      Panic attacks        PAST SURGICAL HISTORY:   Past Surgical History:   Procedure Laterality Date     ARTHROSCOPY KNEE Right 9/22/2016    Procedure: ARTHROSCOPY KNEE;  Surgeon: Fredo Hughes MD;  Location: [facility] OR     COMBINED ESOPHAGOSCOPY, GASTROSCOPY, DUODENOSCOPY (EGD) WITH CO2 INSUFFLATION N/A 1/19/2021    Procedure: ESOPHAGOGASTRODUODENOSCOPY, WITH CO2 INSUFFLATION;  Surgeon: Brandon Mack MD;  Location: [facility] OR     ESOPHAGOSCOPY, GASTROSCOPY, DUODENOSCOPY (EGD), COMBINED N/A 1/19/2021    Procedure: Esophagogastroduodenoscopy, With Biopsy;  Surgeon: Brandon Mack MD;  Location: [facility] OR     INJECT PARAVERTEBRAL FACET JOINT LUMBAR / SACRAL FIRST Right 11/25/2016    Procedure: INJECT PARAVERTEBRAL FACET JOINT LUMBAR / SACRAL FIRST;  Surgeon: Doretha Magallanes MD;  Location: [facility] OR     ORTHOPEDIC SURGERY      Rt knee X 2     ORTHOPEDIC SURGERY      Lt foot       Past medical history, past surgical  history, medications, and allergies were reviewed with the patient. Additional pertinent items: None    FAMILY HISTORY:   Family History   Problem Relation Age of Onset     Autoimmune Disease No family hx of        SOCIAL HISTORY:   Social History     Tobacco Use     Smoking status: Never Smoker     Smokeless tobacco: Former User     Types: Chew     Tobacco comment: Chew   Substance Use Topics     Alcohol use: No     Comment: none     Social history was reviewed with the patient. Additional pertinent items: None      Patient's Medications   New Prescriptions    No medications on file   Previous Medications    ACETAMINOPHEN (TYLENOL) 500 MG TABLET    Take 1,000 mg by mouth every 6 hours as needed for mild pain (headache)    ARIPIPRAZOLE (ABILIFY) 2 MG TABLET    Take 1 tablet (2 mg) by mouth daily    ATENOLOL (TENORMIN) 25 MG TABLET    Take 1 tablet (25 mg) by mouth daily    CHLORZOXAZONE (PARAFON FORTE) 500 MG TABLET    Take 1 tablet (500 mg) by mouth 3 times daily as needed for muscle spasms    CHOLECALCIFEROL (VITAMIN D3) 125 MCG (5000 UNITS) CAPSULE    Take 125 mcg by mouth daily    CLONAZEPAM (KLONOPIN) 1 MG TABLET    TAKE ONE-HALF - 1 TABLET BY MOUTH ONCE DAILY AS NEEDED FOR ANXIETY    DICLOFENAC (VOLTAREN) 1 % TOPICAL GEL    Apply 2 g topically 4 times daily    ESCITALOPRAM (LEXAPRO) 10 MG TABLET    Take 2 tablets (20 mg) by mouth daily    ETANERCEPT (ENBREL SURECLICK) 50 MG/ML AUTOINJECTOR    Inject 50 mg Subcutaneous once a week    FISH OIL-OMEGA-3 FATTY ACIDS 1000 MG CAPSULE    Take 1 g by mouth daily    FOLIC ACID (FOLVITE) 1 MG TABLET    Take 3 tablets (3 mg) by mouth daily    LIDOCAINE (XYLOCAINE) 5 % EXTERNAL OINTMENT    Apply topically every 4 hours as needed for moderate pain    MELATONIN 3 MG TABLET    Take 1 tablet (3 mg) by mouth At Bedtime    METHOTREXATE SODIUM 2.5 MG TABS    Take 4 tablets (10 mg) by mouth every 7 days    MULTIVITAMIN (CENTRUM SILVER) TABLET    Take 1 tablet by mouth daily     "NICOTINE POLACRILEX (NICORETTE) 4 MG GUM    PLACE 1 PIECE INSIDE THE CHEEK AS NEEDED FOR SMOKING CESSATION    OMEPRAZOLE (PRILOSEC) 20 MG DR CAPSULE    Take 20 mg by mouth 2 times daily     OXYCODONE (OXYCONTIN) 10 MG 12 HR TABLET    Take 1 tablet (10 mg) by mouth every 12 hours Fill 3/2/2021. Begin 3/4/2021    OXYCODONE (ROXICODONE) 5 MG TABLET    Take 1 tablet (5 mg) by mouth every 6 hours as needed for severe pain Max of 3 tabs/day.  Okay to dispense 3/8/2021 and start 3/10/2021 30 day script    TRAZODONE (DESYREL) 50 MG TABLET    Take 0.5 tablets (25 mg) by mouth At Bedtime . With additional 0.25 tablet (12.5mg) as needed daily for anxiety.   Modified Medications    No medications on file   Discontinued Medications    No medications on file          Allergies   Allergen Reactions     Mirtazapine      Other reaction(s): Coma     Hydrocodone Itching     Ms Contin [Morphine] Itching     Remeron Soltab Other (See Comments)     \"Blacked out\" for one week        Review of Systems   Constitutional: Negative for fever.   HENT: Negative for rhinorrhea.    Eyes: Negative for visual disturbance.   Respiratory: Negative for shortness of breath.    Cardiovascular: Negative for chest pain.   Gastrointestinal: Negative for abdominal pain.   Endocrine: Negative for polyuria.   Genitourinary: Negative for dysuria.   Musculoskeletal: Positive for back pain (chronic).   Skin: Negative for rash.   Allergic/Immunologic: Negative for immunocompromised state.   Neurological: Negative for weakness.   Hematological: Does not bruise/bleed easily.   Psychiatric/Behavioral: Positive for dysphoric mood and suicidal ideas. The patient is nervous/anxious.        Physical Exam   BP: (!) 142/87  Pulse: 72  Temp: 98.8  F (37.1  C)  Resp: 16  Weight: 123.4 kg (272 lb)  SpO2: 98 %      Physical Exam  General: Afebrile, no acute distress   HEENT: Normocephalic, atraumatic, conjunctivae normal. MMM  Neck: non-tender, supple  Cardio: regular rate. " regular rhythm   Resp: Normal work of breathing, no respiratory distress, lungs clear bilaterally, no wheezing, rhonchi, rales  Chest/Back: no visual signs of trauma, no CVA tenderness   Abdomen: soft, non distension, no tenderness, no peritoneal signs   Neuro: alert and fully oriented. CN II-XII grossly intact. Grossly normal strength and sensation in all extremities.   MSK: no deformities. Normal range of motion  Integumentary/Skin: no rash visualized, normal color  Psych:  Tearful, suicide ideation, denies homicidal ideation, thought content is not paranoid or delusional    ED Course        Procedures    Results for orders placed or performed during the hospital encounter of 03/20/21 (from the past 24 hour(s))   Drug abuse screen 6 urine (tox)   Result Value Ref Range    Amphetamine Qual Urine Negative NEG^Negative    Barbiturates Qual Urine Negative NEG^Negative    Benzodiazepine Qual Urine Negative NEG^Negative    Cannabinoids Qual Urine Negative NEG^Negative    Cocaine Qual Urine Negative NEG^Negative    Ethanol Qual Urine Negative NEG^Negative    Opiates Qualitative Urine Negative NEG^Negative     Medications   oxyCODONE (ROXICODONE) tablet 5 mg (5 mg Oral Given 3/21/21 0153)   oxyCODONE (oxyCONTIN) 12 hr tablet 10 mg (10 mg Oral Given 3/21/21 0355)          Assessments & Plan (with Medical Decision Making)   Jailene Breen is a 55 year old male with history of ankylosing spondylitis, rheumatoid arthritis, major depressive disorder and generalized anxiety disorder who presents with worsening depression and suicidal ideation.  On arrival patient is tearful but otherwise well-appearing, afebrile, no distress.  Patient recently admitted for depression, anxiety, now here with worsening depression, and suicidal ideation since discharge from the hospital.    Behavioral health  evaluated patient as well as myself at this time will plan on mental health admission for further evaluation, treatment, and  stabilization of patient's depression, suicidal ideation.  Patient understands and agrees to plan.    I have reviewed the nursing notes.    I have reviewed the findings, diagnosis, plan and need for follow up with the patient.    New Prescriptions    No medications on file       Final diagnoses:   Depression, unspecified depression type   Suicide ideation   I, Aparna Browning, am serving as a trained medical scribe to document services personally performed by Patricia Andrews MD based on the provider's statements to me on March 21, 2021.  This document has been checked and approved by the attending provider.    I, Patricia Andrews MD, was physically present and have reviewed and verified the accuracy of this note documented by Aparna Browning, medical scribe.       3/20/2021   [facility] EMERGENCY DEPARTMENT     Patricia Andrews MD  03/21/21 0619

## 2021-03-21 NOTE — PROGRESS NOTES
03/21/21 1042   Valuables   Patient Belongings locker   Patient Belongings Put in Hospital Secure Location (Security or Locker, etc.) cell phone/electronics;clothing;shoes   Did you bring any home meds/supplements to the hospital?  No       A                 Pt locker: Red coat, black shoes, gray pants, and a cell phone.    Admission:  I am responsible for any personal items that are not sent to the safe or pharmacy.  [facility] is not responsible for loss, theft or damage of any property in my possession.    Signature:  _________________________________ Date: _______  Time: _____                                              Staff Signature:  ____________________________ Date: ________  Time: _____      2nd Staff person, if patient is unable/unwilling to sign:    Signature: ________________________________ Date: ________  Time: _____     Discharge:  [facility] has returned all of my personal belongings:    Signature: _________________________________ Date: ________  Time: _____                                          Staff Signature:  ____________________________ Date: ________  Time: _____

## 2021-03-21 NOTE — PLAN OF CARE
"  Problem: Depressive Symptoms  Goal: Depressive Symptoms  Description: Signs and symptoms of listed problems will be absent or manageable.  3/21/2021 1852 by Nissa Bales, RN  Outcome: No Change  3/21/2021 1148 by Nissa Bales RN  Outcome: No Change  Flowsheets (Taken 3/21/2021 1148)  Depressive Symptoms Assessed: all  Depressive Symptoms Present:   suicidality   affect   mood   anxiety   insight    Pt napped for the first 2-3 hours of this evening shift. He presents with a slightly brighter affect compared to when he admitted earlier today. He was able to join his peers for dinner and ate with a good appetite. He reports, \"I feel much better, I needed the sleep.\" He continues to endorse depression and anxiety, \"not much has changed\". He denies suicidal ideation and contracts for safety on the unit. He is future oriented and hopeful. He was observed talking and laughing on the phone to his family after dinner. He updated writer that his wife will be dropping his Enbrel injections tomorrow after she pick them up from [facility]. He has not presented any behavior or safety concerns on the unit.      Prn medication utilized this shift include Oxycodone 5 mg x 1 at 5:49. He reports his pain has been better managed.   "

## 2021-03-21 NOTE — ED NOTES
[facility] ED Mental Health Handoff Note:       Brief HPI:  This is a 55 year old male signed out to me by Dr. Andrews.  See initial ED Provider note for full details of the presentation. Interval history is pertinent for neg.    Home meds reviewed and ordered/administered: Yes    Medically stable for inpatient mental health admission: Yes.    Evaluated by mental health: Yes. The recommendation is for inpatient mental health treatment. Bed search in process    Safety concerns: At the time I received sign out, there were no safety concerns.    Hold Status:  Active Orders   N/A            Exam:   Patient Vitals for the past 24 hrs:   BP Temp Temp src Pulse Resp SpO2 Weight   03/21/21 0657 127/75 -- -- 67 16 95 % --   03/20/21 2338 (!) 142/87 98.8  F (37.1  C) Oral 72 16 98 % 123.4 kg (272 lb)             ED Course:    Medications   ARIPiprazole (ABILIFY) tablet 2 mg (has no administration in time range)   atenolol (TENORMIN) tablet 25 mg (has no administration in time range)   escitalopram (LEXAPRO) tablet 20 mg (has no administration in time range)   folic acid (FOLVITE) tablet 3 mg (has no administration in time range)   oxyCODONE (ROXICODONE) tablet 5 mg (has no administration in time range)   oxyCODONE (oxyCONTIN) 12 hr tablet 10 mg (has no administration in time range)   multivitamin w/minerals (THERA-VIT-M) tablet 1 tablet (has no administration in time range)   cholecalciferol (VITAMIN D3) 125 mcg (5000 units) capsule 125 mcg (has no administration in time range)   omeprazole (priLOSEC) CR capsule 20 mg (has no administration in time range)   chlorzoxazone (PARAFON FORTE) tablet 500 mg (has no administration in time range)   nicotine (NICORETTE) gum 2 mg (has no administration in time range)   oxyCODONE (ROXICODONE) tablet 5 mg (5 mg Oral Given 3/21/21 0153)   oxyCODONE (oxyCONTIN) 12 hr tablet 10 mg (10 mg Oral Given 3/21/21 1475)            There were no significant events during my  shift.          Impression:    ICD-10-CM    1. Depression, unspecified depression type  F32.9 Asymptomatic Influenza A/B & SARS-CoV2 (COVID-19) Virus PCR Multiplex   2. Suicide ideation  R45.851        Plan:    1. Awaiting inpatient mental health admission/transfer.      RESULTS:   Results for orders placed or performed during the hospital encounter of 03/20/21 (from the past 24 hour(s))   Drug abuse screen 6 urine (tox)     Status: None    Collection Time: 03/21/21  2:05 AM   Result Value Ref Range    Amphetamine Qual Urine Negative NEG^Negative    Barbiturates Qual Urine Negative NEG^Negative    Benzodiazepine Qual Urine Negative NEG^Negative    Cannabinoids Qual Urine Negative NEG^Negative    Cocaine Qual Urine Negative NEG^Negative    Ethanol Qual Urine Negative NEG^Negative    Opiates Qualitative Urine Negative NEG^Negative             MD Jimbo Lam Kevin Scott, MD  03/21/21 8774

## 2021-03-21 NOTE — ED NOTES
"Pt reports suicidal thoughts, reports his subconscious is telling him he is \"worthless\". Reports he is NOT having auditory hallucinations.  "

## 2021-03-21 NOTE — ED NOTES
Pt requesting oxycontin along with oxycodone, MD notified. Offered alternative pain measures, hot pack, pillow behind back and refused
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
## 2021-03-22 ENCOUNTER — TELEPHONE (OUTPATIENT)
Dept: BEHAVIORAL HEALTH | Facility: CLINIC | Age: 56
End: 2021-03-22

## 2021-03-22 PROCEDURE — 99222 1ST HOSP IP/OBS MODERATE 55: CPT | Performed by: PHYSICIAN ASSISTANT

## 2021-03-22 PROCEDURE — 250N000013 HC RX MED GY IP 250 OP 250 PS 637: Performed by: PSYCHIATRY & NEUROLOGY

## 2021-03-22 PROCEDURE — 250N000013 HC RX MED GY IP 250 OP 250 PS 637: Performed by: EMERGENCY MEDICINE

## 2021-03-22 PROCEDURE — 99207 PR CONSULT E&M CHANGED TO INITIAL LEVEL: CPT | Performed by: PHYSICIAN ASSISTANT

## 2021-03-22 PROCEDURE — 250N000013 HC RX MED GY IP 250 OP 250 PS 637: Performed by: NURSE PRACTITIONER

## 2021-03-22 PROCEDURE — 124N000003 HC R&B MH SENIOR/ADOLESCENT

## 2021-03-22 RX ORDER — FOLIC ACID 1 MG/1
5 TABLET ORAL DAILY
Status: DISCONTINUED | OUTPATIENT
Start: 2021-03-23 | End: 2021-03-27 | Stop reason: HOSPADM

## 2021-03-22 RX ORDER — LIOTHYRONINE SODIUM 25 UG/1
25 TABLET ORAL DAILY
Status: DISCONTINUED | OUTPATIENT
Start: 2021-03-22 | End: 2021-03-27 | Stop reason: HOSPADM

## 2021-03-22 RX ADMIN — Medication 125 MCG: at 08:36

## 2021-03-22 RX ADMIN — OXYCODONE HYDROCHLORIDE 5 MG: 5 TABLET ORAL at 12:40

## 2021-03-22 RX ADMIN — OXYCODONE HYDROCHLORIDE 10 MG: 10 TABLET, FILM COATED, EXTENDED RELEASE ORAL at 08:36

## 2021-03-22 RX ADMIN — OMEPRAZOLE 20 MG: 20 CAPSULE, DELAYED RELEASE ORAL at 08:37

## 2021-03-22 RX ADMIN — NICOTINE POLACRILEX 4 MG: 2 GUM, CHEWING BUCCAL at 08:35

## 2021-03-22 RX ADMIN — OXYCODONE HYDROCHLORIDE 5 MG: 5 TABLET ORAL at 08:37

## 2021-03-22 RX ADMIN — MELATONIN TAB 3 MG 3 MG: 3 TAB at 21:41

## 2021-03-22 RX ADMIN — DICLOFENAC SODIUM 2 G: 10 GEL TOPICAL at 21:41

## 2021-03-22 RX ADMIN — Medication 25 MG: at 21:41

## 2021-03-22 RX ADMIN — ACETAMINOPHEN 1000 MG: 500 TABLET, FILM COATED ORAL at 12:39

## 2021-03-22 RX ADMIN — FOLIC ACID 3 MG: 1 TABLET ORAL at 08:37

## 2021-03-22 RX ADMIN — NICOTINE POLACRILEX 4 MG: 2 GUM, CHEWING BUCCAL at 11:38

## 2021-03-22 RX ADMIN — DICLOFENAC SODIUM 2 G: 10 GEL TOPICAL at 08:41

## 2021-03-22 RX ADMIN — CHLORZOXAZONE 500 MG: 500 TABLET ORAL at 14:12

## 2021-03-22 RX ADMIN — NICOTINE POLACRILEX 4 MG: 2 GUM, CHEWING BUCCAL at 19:57

## 2021-03-22 RX ADMIN — ESCITALOPRAM OXALATE 20 MG: 20 TABLET ORAL at 08:36

## 2021-03-22 RX ADMIN — CHLORZOXAZONE 500 MG: 500 TABLET ORAL at 19:58

## 2021-03-22 RX ADMIN — DICLOFENAC SODIUM 2 G: 10 GEL TOPICAL at 12:40

## 2021-03-22 RX ADMIN — OXYCODONE HYDROCHLORIDE 5 MG: 5 TABLET ORAL at 19:57

## 2021-03-22 RX ADMIN — Medication 2 MG: at 08:36

## 2021-03-22 RX ADMIN — LIOTHYRONINE SODIUM 25 MCG: 25 TABLET ORAL at 14:13

## 2021-03-22 RX ADMIN — MULTIPLE VITAMINS W/ MINERALS TAB 1 TABLET: TAB at 08:37

## 2021-03-22 RX ADMIN — Medication 1 G: at 08:37

## 2021-03-22 RX ADMIN — NICOTINE POLACRILEX 4 MG: 2 GUM, CHEWING BUCCAL at 15:13

## 2021-03-22 RX ADMIN — OXYCODONE HYDROCHLORIDE 10 MG: 10 TABLET, FILM COATED, EXTENDED RELEASE ORAL at 19:57

## 2021-03-22 RX ADMIN — NICOTINE POLACRILEX 4 MG: 2 GUM, CHEWING BUCCAL at 21:41

## 2021-03-22 RX ADMIN — ATENOLOL 25 MG: 25 TABLET ORAL at 08:36

## 2021-03-22 ASSESSMENT — ACTIVITIES OF DAILY LIVING (ADL)
LAUNDRY: UNABLE TO COMPLETE
HYGIENE/GROOMING: INDEPENDENT
DRESS: INDEPENDENT
DRESS: INDEPENDENT
HYGIENE/GROOMING: INDEPENDENT
ORAL_HYGIENE: INDEPENDENT
LAUNDRY: UNABLE TO COMPLETE
ORAL_HYGIENE: INDEPENDENT

## 2021-03-22 NOTE — PLAN OF CARE
The patient completed the reasons for admit and goals for discharge in the personal plan of care.   Reasons for admit:    1)  Racing thoughts and confusion  2)  Depression  3)  Overwhelmed  4)  Chronic pain    Goals for discharge:  1)  Feel better  2)  Pain management

## 2021-03-22 NOTE — CONSULTS
Chart reviewed. Patient [facility] and <55, no acute medical concerns, evaluated by ER MD.    In short, Jailene Breen is a 55 year old male with a past medical history of chronic back pain, HLA b27 positive ankylosing spondylitis, anxiety, depression who is admitted to station 3B for depression w/ SI.    Discussed with RN, no acute medical concerns.     #Depression w/ SI  -Management per psych  #HLA-B27 positive ankylosing spondylitis  #Chronic pain  PTA maintained methotrexate qweek (due Tuesday 02/23), etanercept qmonth (next due 02/23), oxycontin 10 mg Q12 hours and oxycodone 5 mg q6 hours PRN (not to exceed 3 tabs/day), chlorzoxazone TID PRN for muscle spasms w/ folic acid, MVI, PPI.  -Continue home meds w/ PCP f/u, rheumatology f/u, pain & palliative f/u  #HTN: PTA maintained on atenolol. BP stable. Continue.     Please consult medicine or call on call JAX/MD if any acute issues arise.    Samantha Ayala PA-C

## 2021-03-22 NOTE — PLAN OF CARE
Problem: OT General Care Plan  Goal: OT Goal 1  Description: Will consistently attend OT groups and improve coping strategies with increasing repertoire of ideas and understanding of symptoms of when to use the strategies.         Note: .Pt has not attended OT groups yet. They will be encouraged to attend groups and be provided a Self Assessment form.  OT staff will explain the value of OT, including them in their treatment plan and offer options to meet their needs and identify goals.

## 2021-03-22 NOTE — TELEPHONE ENCOUNTER
Per patient myChart message:  From: Jamison Breen      Created: 3/21/2021 6:23 AM        Devante Meza, just incase I'm still in the hospital on Tuesday can we do a video visit so I don't miss it.

## 2021-03-22 NOTE — PLAN OF CARE
Patient pleasant and cooperative, visible in milieu.  Flat affect, denies SI/SIB, rated depression 8/10, anxiety 3/10.  Patient claimed he's more depressed than anxious due to things happening in his life, including his pain.  Good appetite; was seeing by both internal medicine and the psychiatrist.  New medications order.  Patient requested printed information on the new med cytomel which was given.  Resting comfortably in room, will continue to monitor closely.

## 2021-03-22 NOTE — CONSULTS
Internal Medicine Initial Visit    Jailene Breen MRN# [id number]   Age: 55 year old YOB: 1965   Date of Admission: 3/20/2021    ADMIT DATE: 3/20/2021  DATE OF CONSULT: 3/22/2021    PCP: Aiden Resendez    REQUESTING SERVICE: geriatric psych  REASON FOR CONSULT: chronic pain, worsening, ankylosing spondylitis, depression, HTN, hiatal hernia    CHIEF COMPLAINT: worsening pain    HPI: In short, Jailene Breen is a 55 year old male with a past medical history of chronic back pain, HLA b27 positive ankylosing spondylitis, anxiety, depression who is admitted to station 3[facility] for depression w/ SI.    The patient notes he is having worsening of his chronic back pain. He notes he was in the ER for 12 hours, in a chair, no bed or comfortable place to sit. His meds were late. He notes since then he's been having worsening of his chronic back pain. Would like an increase in his oxycodone. Has a pain team that he closely follows with. Denies anything new, no chest pain, dyspnea, cough, fevers/chills, edema, rashes/lesions, abdominal pain, nausea/vomiting. He is stooling less while he is here, would not like stool softeners. He notes some difficulty taking a deep breath chronically which he and his surgeon attribute to his hiatal hernia, which he is hoping to have repaired. His meds for rheumatologic condition are due tomorrow, and wife is brining in [facility].     ROS:   10 point ROS asked and otherwise negative, with exceptions as noted above in HPI.     PMH:  Past Medical History:   Diagnosis Date     Ankylosing spondylitis (H) 3/1/2017     Anxiety      Arthritis     back, knees     Back pain     possible drug seeking behavior in the past.      Hypertension      Panic attacks        PSH:  Past Surgical History:   Procedure Laterality Date     ARTHROSCOPY KNEE Right 9/22/2016    Procedure: ARTHROSCOPY KNEE;  Surgeon: Fredo Hughes MD;  Location: [facility] OR     COMBINED ESOPHAGOSCOPY,  GASTROSCOPY, DUODENOSCOPY (EGD) WITH CO2 INSUFFLATION N/A 1/19/2021    Procedure: ESOPHAGOGASTRODUODENOSCOPY, WITH CO2 INSUFFLATION;  Surgeon: Brandon Mack MD;  Location: [facility] OR     ESOPHAGOSCOPY, GASTROSCOPY, DUODENOSCOPY (EGD), COMBINED N/A 1/19/2021    Procedure: Esophagogastroduodenoscopy, With Biopsy;  Surgeon: Brandon Mack MD;  Location: [facility] OR     INJECT PARAVERTEBRAL FACET JOINT LUMBAR / SACRAL FIRST Right 11/25/2016    Procedure: INJECT PARAVERTEBRAL FACET JOINT LUMBAR / SACRAL FIRST;  Surgeon: Doretha Magallanes MD;  Location: [facility] OR     ORTHOPEDIC SURGERY      Rt knee X 2     ORTHOPEDIC SURGERY      Lt foot       MEDICATIONS  No current facility-administered medications on file prior to encounter.   ARIPiprazole (ABILIFY) 2 MG tablet, Take 1 tablet (2 mg) by mouth daily  atenolol (TENORMIN) 25 MG tablet, Take 1 tablet (25 mg) by mouth daily  chlorzoxazone (PARAFON FORTE) 500 MG tablet, Take 1 tablet (500 mg) by mouth 3 times daily as needed for muscle spasms  cholecalciferol (VITAMIN D3) 125 mcg (5000 units) capsule, Take 125 mcg by mouth daily  clonazePAM (KLONOPIN) 1 MG tablet, TAKE ONE-HALF - 1 TABLET BY MOUTH ONCE DAILY AS NEEDED FOR ANXIETY  escitalopram (LEXAPRO) 10 MG tablet, Take 2 tablets (20 mg) by mouth daily  etanercept (ENBREL SURECLICK) 50 MG/ML autoinjector, Inject 50 mg Subcutaneous once a week (Patient taking differently: Inject 50 mg Subcutaneous once a week Every Tuesday)  fish oil-omega-3 fatty acids 1000 MG capsule, Take 1 g by mouth daily  folic acid (FOLVITE) 1 MG tablet, Take 3 tablets (3 mg) by mouth daily  melatonin 3 MG tablet, Take 1 tablet (3 mg) by mouth At Bedtime  methotrexate sodium 2.5 MG TABS, Take 4 tablets (10 mg) by mouth every 7 days (Patient taking differently: Take 4 tablets by mouth every 7 days Every tuesday)  multivitamin (CENTRUM SILVER) tablet, Take 1 tablet by mouth daily  nicotine polacrilex (NICORETTE) 4 MG gum, PLACE 1 PIECE INSIDE THE CHEEK  "AS NEEDED FOR SMOKING CESSATION  omeprazole (PRILOSEC) 20 MG DR capsule, Take 20 mg by mouth daily   oxyCODONE (OXYCONTIN) 10 MG 12 hr tablet, Take 1 tablet (10 mg) by mouth every 12 hours Fill 3/2/2021. Begin 3/4/2021  oxyCODONE (ROXICODONE) 5 MG tablet, Take 1 tablet (5 mg) by mouth every 6 hours as needed for severe pain Max of 3 tabs/day.  Okay to dispense 3/8/2021 and start 3/10/2021 30 day script (Patient taking differently: Take 5 mg by mouth every 4 hours as needed for severe pain Max of 3 tabs/day.  Okay to dispense 3/8/2021 and start 3/10/2021 30 day script)  traZODone (DESYREL) 50 MG tablet, Take 0.5 tablets (25 mg) by mouth At Bedtime . With additional 0.25 tablet (12.5mg) as needed daily for anxiety. (Patient taking differently: Take 50 mg by mouth At Bedtime )  acetaminophen (TYLENOL) 500 MG tablet, Take 1,000 mg by mouth every 6 hours as needed for mild pain (headache)  diclofenac (VOLTAREN) 1 % topical gel, Apply 2 g topically 4 times daily  lidocaine (XYLOCAINE) 5 % external ointment, Apply topically every 4 hours as needed for moderate pain        ALLERGIES:     Allergies   Allergen Reactions     Mirtazapine      Other reaction(s): Coma     Hydrocodone Itching     Ms Contin [Morphine] Itching     Remeron Soltab Other (See Comments)     \"Blacked out\" for one week       FAMILY HISTORY:  Family History   Problem Relation Age of Onset     Autoimmune Disease No family hx of        SOCIAL HISTORY:  Social History     Socioeconomic History     Marital status:      Spouse name: None     Number of children: None     Years of education: None     Highest education level: None   Occupational History     None   Social Needs     Financial resource strain: None     Food insecurity     Worry: None     Inability: None     Transportation needs     Medical: None     Non-medical: None   Tobacco Use     Smoking status: Never Smoker     Smokeless tobacco: Former User     Types: Chew     Tobacco comment: Chew " "  Substance and Sexual Activity     Alcohol use: No     Comment: none     Drug use: No     Sexual activity: Yes     Partners: Female     Comment: decreased with Prozac   Lifestyle     Physical activity     Days per week: None     Minutes per session: None     Stress: None   Relationships     Social connections     Talks on phone: None     Gets together: None     Attends Yarsani service: None     Active member of club or organization: None     Attends meetings of clubs or organizations: None     Relationship status: None     Intimate partner violence     Fear of current or ex partner: None     Emotionally abused: None     Physically abused: None     Forced sexual activity: None   Other Topics Concern     Parent/sibling w/ CABG, MI or angioplasty before 65F 55M? Not Asked   Social History Narrative     None       PHYSICAL EXAM:  Blood pressure 116/78, pulse 55, temperature 97.5  F (36.4  C), temperature source Temporal, resp. rate 16, height 1.93 m (6' 4\"), weight 123.4 kg (272 lb), SpO2 96 %.    GENERAL: Alert and orientated x 3. Appears in no acute distress.   HEENT: Anicteric sclera. Mucous membranes moist and without lesions.   CV: RRR, S1S2, no murmurs appreciated.  RESPIRATORY: Respirations regular, even, and unlabored. Lungs CTAB with no wheezing, rales, rhonchi.   GI: Soft and non distended with normoactive bowel sounds present in all quadrants. No tenderness, rebound, guarding.   EXTREMITIES: No peripheral edema.   NEUROLOGIC: CN II-XII grossly intact. No focal deficits.   MUSCULOSKELETAL: No joint swelling or tenderness. Strength 5/5 bilaterally in upper and lower extremities. TTP of right SI joint, no overlying ecchymosis, rashes or lesions.   SKIN: No jaundice. No rashes or lesions.     LABS:  CMP  Recent Labs   Lab 03/21/21  0808 03/17/21  0748    142   POTASSIUM 4.0 4.1   CHLORIDE 108 110*   CO2 29 27   ANIONGAP 4 5   GLC 95 92   BUN 12 12   CR 0.92 1.12   GFRESTIMATED >90 74   GFRESTBLACK >90 86 "   SERENE 8.3* 8.4*   PROTTOTAL 7.2 6.7*   ALBUMIN 3.5 3.3*   BILITOTAL 0.7 0.6   ALKPHOS 100 91   AST 20 17   ALT 34 30     CBC  Recent Labs   Lab 03/21/21  0808 03/17/21  0748   WBC 6.8 8.3   RBC 5.18 4.95   HGB 15.6 15.0   HCT 45.7 44.1   MCV 88 89   MCH 30.1 30.3   MCHC 34.1 34.0   RDW 13.1 13.3    211     INRNo lab results found in last 7 days.    IMAGING: None to review from [facility] admission    ASSESSMENT & RECOMMENDATIONS:  #Depression w/ SI  -Management per psych  #HLA-B27 positive ankylosing spondylitis  #Chronic pain  PTA maintained methotrexate qweek (due Tuesday 02/23), etanercept qmonth (next due 02/23), oxycontin 10 mg Q12 hours and oxycodone 5 mg q6 hours PRN (not to exceed 3 tabs/day), chlorzoxazone TID PRN for muscle spasms w/ folic acid, MVI, PPI.  -Continue home meds w/ PCP f/u, rheumatology f/u, pain & palliative f/u  -Reached out to pain team, will hold off on increasing PO meds at this point given chronic pain  -Continue lidoderm and diclofenac gel  -Added icy hot  #HTN: PTA maintained on atenolol. BP stable. Continue.   #Hiatal hernia: Continue OP f/u with surgery.     I appreciate the opportunity to participate in the care of this patient. Medicine will sign follow pain recs, please notify on call [facility] if any intercurrent medical issues arise.     Samantha Ayala PA-C

## 2021-03-22 NOTE — PLAN OF CARE
Problem: General Plan of Care (Inpatient Behavioral)  Goal: Team Discussion  Description: Team Plan:  Outcome: No Change  Note: BEHAVIORAL TEAM DISCUSSION    Participants: Dr. Saul Quesada MD, Jazmin TAVAREZ, Margo Payton RN, Susana Wheeler OT  Progress: New admit  Anticipated length of stay: TBD  Continued Stay Criteria/Rationale: Suicidal ideation  Medical/Physical: Chronic pain  Precautions:   Behavioral Orders   Procedures    Code 1 - Restrict to Unit    Discontinue 1:1 attendant for suicide risk     Order Specific Question:   I have performed an in person assessment of the patient     Answer:   Based on this assessment the patient no longer requires a one on one attendant at this point in time.     Order Specific Question:   Rationale     Answer:   Patient States able to remain safe in hospital    Fall precautions    Routine Programming     As clinically indicated    Status 15     Every 15 minutes.    Suicide precautions     Patients on Suicide Precautions should have a Combination Diet ordered that includes a Diet selection(s) AND a Behavioral Tray selection for Safe Tray - with utensils, or Safe Tray - NO utensils       Plan: Psychiatric Assessment. Medication Management. Therapeutic Mileu. Individual and group support.   Rationale for change in precautions or plan: Initial plan

## 2021-03-22 NOTE — PLAN OF CARE
Work Completed: The patient's care was discussed with the treatment team and chart notes were reviewed. Team note and personal plan of care were completed. AVS started. Changed pain management appointment that was scheduled for tomorrow.    Discharge plan or goal: Home                Barriers to discharge: Patient is newly admitted and evaluation is in process.

## 2021-03-22 NOTE — TELEPHONE ENCOUNTER
Writer contacted 3B unit to ask that CTC contact writer with update on patients discharge plans and plan to attend [facility]. Unit staff informed writer that once CTC is assigned they will make contact.     Millie Domingo, [facility], [facility]  3/22/2021

## 2021-03-22 NOTE — PROGRESS NOTES
"Patient seen, chart reviewed, care discussed with staff.  Blood pressure 116/78, pulse 55, temperature 97.5  F (36.4  C), temperature source Temporal, resp. rate 16, height 1.93 m (6' 4\"), weight 123.4 kg (272 lb), SpO2 96 %.    He is asking if more pain relief can occur until he has pain appointment.  Past cares discussed.    General appearance: good  Alert.   Affect: fair  Mood: fair    Speech:  normal.   Eye contact:  good.    Psychomotor behavior: normal  Gait: normal.    Abnormal movements: none  Delusions: none  Hallucinations:  none  Thoughts: logical  Associations: intact  Judgement: good  Insight: good  Cognitions: intact in conversation  Memory:  intact in conversation  Orientation: normal    Not suicidal    Plan:  Re-consult medicine due to request to help more with pain  2.  Change Lexapro to Prozac  3.  Augment with higher dose Folic acid and with Cytomel      Current Facility-Administered Medications:      acetaminophen (TYLENOL) tablet 1,000 mg, 1,000 mg, Oral, Q6H PRN, Melissa Nails APRN CNP, 1,000 mg at 03/22/21 1239     ARIPiprazole (ABILIFY) tablet 2 mg, 2 mg, Oral, Daily, Patricia Andrews MD, 2 mg at 03/22/21 0836     atenolol (TENORMIN) tablet 25 mg, 25 mg, Oral, Daily, Patricia Andrews MD, 25 mg at 03/22/21 0836     chlorzoxazone (PARAFON FORTE) tablet 500 mg, 500 mg, Oral, TID PRN, Patricia Andrews MD     cholecalciferol (VITAMIN D3) 125 mcg (5000 units) capsule 125 mcg, 125 mcg, Oral, Daily, Patricia Andrews MD, 125 mcg at 03/22/21 0836     clonazePAM (klonoPIN) tablet 0.5-1 mg, 0.5-1 mg, Oral, Daily PRN, Melissa Nails APRN CNP     diclofenac (VOLTAREN) 1 % topical gel 2 g, 2 g, Topical, 4x Daily, Melissa Nails APRN CNP, 2 g at 03/22/21 1240     [START ON 3/23/2021] etanercept (ENBREL) 50 MG/ML injection 50 mg, 50 mg, Subcutaneous, Weekly, Melissa Nails APRN CNP     fish oil-omega-3 fatty acids capsule 1 g, 1 g, Oral, Daily, " Melissa Nails APRN CNP, 1 g at 03/22/21 0837     [START ON 3/23/2021] FLUoxetine (PROzac) capsule 20 mg, 20 mg, Oral, Daily, Saul Quesada MD     [START ON 3/23/2021] folic acid (FOLVITE) tablet 5 mg, 5 mg, Oral, Daily, Saul Quesada MD     lidocaine (XYLOCAINE) 5 % ointment, , Topical, Q4H PRN, Melissa Nails APRN CNP     liothyronine (CYTOMEL) tablet 25 mcg, 25 mcg, Oral, Daily, Saul Quesada MD     melatonin tablet 3 mg, 3 mg, Oral, At Bedtime, Melissa Nails APRN CNP, 3 mg at 03/21/21 2030     [START ON 3/23/2021] methotrexate tablet 10 mg, 10 mg, Oral, Q7 Days, Melissa Nails APRN CNP     multivitamin w/minerals (THERA-VIT-M) tablet 1 tablet, 1 tablet, Oral, Daily, Patricia Andrews MD, 1 tablet at 03/22/21 0837     naloxone (NARCAN) injection 0.2 mg, 0.2 mg, Intravenous, Q2 Min PRN **OR** naloxone (NARCAN) injection 0.4 mg, 0.4 mg, Intravenous, Q2 Min PRN **OR** naloxone (NARCAN) injection 0.2 mg, 0.2 mg, Intramuscular, Q2 Min PRN **OR** naloxone (NARCAN) injection 0.4 mg, 0.4 mg, Intramuscular, Q2 Min PRN, Saul Quesada MD     nicotine (NICORETTE) gum 4 mg, 4 mg, Buccal, Q1H PRN, Melissa Nails APRN CNP, 4 mg at 03/22/21 1138     omeprazole (priLOSEC) CR capsule 20 mg, 20 mg, Oral, QAM AC, Melissa Nails APRN CNP, 20 mg at 03/22/21 0837     oxyCODONE (oxyCONTIN) 12 hr tablet 10 mg, 10 mg, Oral, Q12H, Melissa Nails APRN CNP, 10 mg at 03/22/21 0836     oxyCODONE (ROXICODONE) tablet 5 mg, 5 mg, Oral, Q4H PRN, Patricia Andrews MD, 5 mg at 03/22/21 1240     traZODone (DESYREL) half-tab 25 mg, 25 mg, Oral, At Bedtime, Melissa Nails APRN CNP, 25 mg at 03/21/21 2029  Recent Results (from the past 168 hour(s))   Asymptomatic SARS-CoV-2 COVID-19 Virus (Coronavirus) by PCR    Collection Time: 03/16/21 12:47 PM    Specimen: Nasopharyngeal   Result Value Ref Range    SARS-CoV-2 Virus Specimen Source Nasopharyngeal     SARS-CoV-2 PCR  Result NEGATIVE     SARS-CoV-2 PCR Comment (Note)    Drug abuse screen 77 urine ([place], [place], [place])    Collection Time: 03/16/21  1:23 PM   Result Value Ref Range    Amphetamine Qual Urine Negative NEG^Negative    Barbiturates Qual Urine Negative NEG^Negative    Benzodiazepine Qual Urine Negative NEG^Negative    Cannabinoids Qual Urine Negative NEG^Negative    Cocaine Qual Urine Negative NEG^Negative    Opiates Qualitative Urine Negative NEG^Negative    PCP Qual Urine Negative NEG^Negative   CBC with platelets differential    Collection Time: 03/17/21  7:48 AM   Result Value Ref Range    WBC 8.3 4.0 - 11.0 10e9/L    RBC Count 4.95 4.4 - 5.9 10e12/L    Hemoglobin 15.0 13.3 - 17.7 g/dL    Hematocrit 44.1 40.0 - 53.0 %    MCV 89 78 - 100 fl    MCH 30.3 26.5 - 33.0 pg    MCHC 34.0 31.5 - 36.5 g/dL    RDW 13.3 10.0 - 15.0 %    Platelet Count 211 150 - 450 10e9/L    Diff Method Automated Method     % Neutrophils 53.5 %    % Lymphocytes 33.0 %    % Monocytes 8.0 %    % Eosinophils 4.3 %    % Basophils 1.0 %    % Immature Granulocytes 0.2 %    Nucleated RBCs 0 0 /100    Absolute Neutrophil 4.4 1.6 - 8.3 10e9/L    Absolute Lymphocytes 2.7 0.8 - 5.3 10e9/L    Absolute Monocytes 0.7 0.0 - 1.3 10e9/L    Absolute Eosinophils 0.4 0.0 - 0.7 10e9/L    Absolute Basophils 0.1 0.0 - 0.2 10e9/L    Abs Immature Granulocytes 0.0 0 - 0.4 10e9/L    Absolute Nucleated RBC 0.0    Comprehensive metabolic panel    Collection Time: 03/17/21  7:48 AM   Result Value Ref Range    Sodium 142 133 - 144 mmol/L    Potassium 4.1 3.4 - 5.3 mmol/L    Chloride 110 (H) 94 - 109 mmol/L    Carbon Dioxide 27 20 - 32 mmol/L    Anion Gap 5 3 - 14 mmol/L    Glucose 92 70 - 99 mg/dL    Urea Nitrogen 12 7 - 30 mg/dL    Creatinine 1.12 0.66 - 1.25 mg/dL    GFR Estimate 74 >60 mL/min/[1.73_m2]    GFR Estimate If Black 86 >60 mL/min/[1.73_m2]    Calcium 8.4 (L) 8.5 - 10.1 mg/dL    Bilirubin Total 0.6 0.2 - 1.3 mg/dL    Albumin 3.3 (L) 3.4 - 5.0 g/dL    Protein Total 6.7 (L)  6.8 - 8.8 g/dL    Alkaline Phosphatase 91 40 - 150 U/L    ALT 30 0 - 70 U/L    AST 17 0 - 45 U/L   TSH with free T4 reflex and/or T3 as indicated    Collection Time: 03/17/21  7:48 AM   Result Value Ref Range    TSH 2.01 0.40 - 4.00 mU/L   Folate    Collection Time: 03/17/21  7:48 AM   Result Value Ref Range    Folate 17.2 >5.4 ng/mL   Vitamin B12    Collection Time: 03/17/21  7:48 AM   Result Value Ref Range    Vitamin B12 466 193 - 986 pg/mL   Drug abuse screen 6 urine (tox)    Collection Time: 03/21/21  2:05 AM   Result Value Ref Range    Amphetamine Qual Urine Negative NEG^Negative    Barbiturates Qual Urine Negative NEG^Negative    Benzodiazepine Qual Urine Negative NEG^Negative    Cannabinoids Qual Urine Negative NEG^Negative    Cocaine Qual Urine Negative NEG^Negative    Ethanol Qual Urine Negative NEG^Negative    Opiates Qualitative Urine Negative NEG^Negative   Asymptomatic Influenza A/B & SARS-CoV2 (COVID-19) Virus PCR Multiplex    Collection Time: 03/21/21  6:34 AM    Specimen: Nasopharyngeal   Result Value Ref Range    Flu A/B & SARS-COV-2 PCR Source Nasopharyngeal     SARS-CoV-2 PCR Result NEGATIVE     Influenza A PCR Negative NEG^Negative    Influenza B PCR Negative NEG^Negative    Respiratory Syncytial Virus PCR (Note)     Flu A/B & SARS-CoV-2 PCR Comment (Note)    CBC with platelets differential    Collection Time: 03/21/21  8:08 AM   Result Value Ref Range    WBC 6.8 4.0 - 11.0 10e9/L    RBC Count 5.18 4.4 - 5.9 10e12/L    Hemoglobin 15.6 13.3 - 17.7 g/dL    Hematocrit 45.7 40.0 - 53.0 %    MCV 88 78 - 100 fl    MCH 30.1 26.5 - 33.0 pg    MCHC 34.1 31.5 - 36.5 g/dL    RDW 13.1 10.0 - 15.0 %    Platelet Count 246 150 - 450 10e9/L    Diff Method Automated Method     % Neutrophils 53.7 %    % Lymphocytes 31.9 %    % Monocytes 8.9 %    % Eosinophils 4.2 %    % Basophils 1.0 %    % Immature Granulocytes 0.3 %    Nucleated RBCs 0 0 /100    Absolute Neutrophil 3.7 1.6 - 8.3 10e9/L    Absolute Lymphocytes  2.2 0.8 - 5.3 10e9/L    Absolute Monocytes 0.6 0.0 - 1.3 10e9/L    Absolute Eosinophils 0.3 0.0 - 0.7 10e9/L    Absolute Basophils 0.1 0.0 - 0.2 10e9/L    Abs Immature Granulocytes 0.0 0 - 0.4 10e9/L    Absolute Nucleated RBC 0.0    Comprehensive metabolic panel    Collection Time: 03/21/21  8:08 AM   Result Value Ref Range    Sodium 141 133 - 144 mmol/L    Potassium 4.0 3.4 - 5.3 mmol/L    Chloride 108 94 - 109 mmol/L    Carbon Dioxide 29 20 - 32 mmol/L    Anion Gap 4 3 - 14 mmol/L    Glucose 95 70 - 99 mg/dL    Urea Nitrogen 12 7 - 30 mg/dL    Creatinine 0.92 0.66 - 1.25 mg/dL    GFR Estimate >90 >60 mL/min/[1.73_m2]    GFR Estimate If Black >90 >60 mL/min/[1.73_m2]    Calcium 8.3 (L) 8.5 - 10.1 mg/dL    Bilirubin Total 0.7 0.2 - 1.3 mg/dL    Albumin 3.5 3.4 - 5.0 g/dL    Protein Total 7.2 6.8 - 8.8 g/dL    Alkaline Phosphatase 100 40 - 150 U/L    ALT 34 0 - 70 U/L    AST 20 0 - 45 U/L   TSH with free T4 reflex    Collection Time: 03/21/21  8:08 AM   Result Value Ref Range    TSH 2.39 0.40 - 4.00 mU/L   \

## 2021-03-22 NOTE — PLAN OF CARE
Problem: Depressive Symptoms  Goal: Depressive Symptoms  Description: Signs and symptoms of listed problems will be absent or manageable.  Outcome: No Change     Slept for 7.5 hours tonight, no complaints of pain.

## 2021-03-23 ENCOUNTER — TELEPHONE (OUTPATIENT)
Dept: PALLIATIVE MEDICINE | Facility: CLINIC | Age: 56
End: 2021-03-23

## 2021-03-23 PROCEDURE — 124N000003 HC R&B MH SENIOR/ADOLESCENT

## 2021-03-23 PROCEDURE — 250N000013 HC RX MED GY IP 250 OP 250 PS 637: Performed by: NURSE PRACTITIONER

## 2021-03-23 PROCEDURE — 250N000013 HC RX MED GY IP 250 OP 250 PS 637: Performed by: PHYSICIAN ASSISTANT

## 2021-03-23 PROCEDURE — 250N000013 HC RX MED GY IP 250 OP 250 PS 637: Performed by: EMERGENCY MEDICINE

## 2021-03-23 PROCEDURE — 250N000013 HC RX MED GY IP 250 OP 250 PS 637: Performed by: PSYCHIATRY & NEUROLOGY

## 2021-03-23 PROCEDURE — 250N000012 HC RX MED GY IP 250 OP 636 PS 637: Performed by: NURSE PRACTITIONER

## 2021-03-23 RX ORDER — LIDOCAINE 4 G/G
1-3 PATCH TOPICAL
Status: DISCONTINUED | OUTPATIENT
Start: 2021-03-23 | End: 2021-03-27 | Stop reason: HOSPADM

## 2021-03-23 RX ORDER — PRAZOSIN HYDROCHLORIDE 1 MG/1
1 CAPSULE ORAL AT BEDTIME
Status: DISCONTINUED | OUTPATIENT
Start: 2021-03-23 | End: 2021-03-27 | Stop reason: HOSPADM

## 2021-03-23 RX ADMIN — OXYCODONE HYDROCHLORIDE 5 MG: 5 TABLET ORAL at 02:57

## 2021-03-23 RX ADMIN — DICLOFENAC SODIUM 2 G: 10 GEL TOPICAL at 19:52

## 2021-03-23 RX ADMIN — OXYCODONE HYDROCHLORIDE 5 MG: 5 TABLET ORAL at 12:42

## 2021-03-23 RX ADMIN — NICOTINE POLACRILEX 4 MG: 2 GUM, CHEWING BUCCAL at 02:59

## 2021-03-23 RX ADMIN — METHOTREXATE SODIUM 10 MG: 2.5 TABLET ORAL at 18:14

## 2021-03-23 RX ADMIN — OXYCODONE HYDROCHLORIDE 5 MG: 5 TABLET ORAL at 08:38

## 2021-03-23 RX ADMIN — MELATONIN TAB 3 MG 3 MG: 3 TAB at 21:18

## 2021-03-23 RX ADMIN — DICLOFENAC SODIUM 2 G: 10 GEL TOPICAL at 16:19

## 2021-03-23 RX ADMIN — MULTIPLE VITAMINS W/ MINERALS TAB 1 TABLET: TAB at 08:22

## 2021-03-23 RX ADMIN — NICOTINE POLACRILEX 4 MG: 2 GUM, CHEWING BUCCAL at 08:38

## 2021-03-23 RX ADMIN — OXYCODONE HYDROCHLORIDE 5 MG: 5 TABLET ORAL at 23:51

## 2021-03-23 RX ADMIN — ATENOLOL 25 MG: 25 TABLET ORAL at 08:22

## 2021-03-23 RX ADMIN — OXYCODONE HYDROCHLORIDE 5 MG: 5 TABLET ORAL at 16:18

## 2021-03-23 RX ADMIN — CHLORZOXAZONE 500 MG: 500 TABLET ORAL at 12:38

## 2021-03-23 RX ADMIN — NICOTINE POLACRILEX 4 MG: 2 GUM, CHEWING BUCCAL at 19:53

## 2021-03-23 RX ADMIN — Medication 2 MG: at 08:21

## 2021-03-23 RX ADMIN — OXYCODONE HYDROCHLORIDE 10 MG: 10 TABLET, FILM COATED, EXTENDED RELEASE ORAL at 08:22

## 2021-03-23 RX ADMIN — PRAZOSIN HYDROCHLORIDE 1 MG: 1 CAPSULE ORAL at 21:21

## 2021-03-23 RX ADMIN — NICOTINE POLACRILEX 4 MG: 2 GUM, CHEWING BUCCAL at 13:19

## 2021-03-23 RX ADMIN — LIOTHYRONINE SODIUM 25 MCG: 25 TABLET ORAL at 08:24

## 2021-03-23 RX ADMIN — DICLOFENAC SODIUM 2 G: 10 GEL TOPICAL at 08:26

## 2021-03-23 RX ADMIN — Medication 25 MG: at 21:18

## 2021-03-23 RX ADMIN — FOLIC ACID 5 MG: 1 TABLET ORAL at 08:21

## 2021-03-23 RX ADMIN — Medication 125 MCG: at 08:21

## 2021-03-23 RX ADMIN — CHLORZOXAZONE 500 MG: 500 TABLET ORAL at 21:31

## 2021-03-23 RX ADMIN — CLONAZEPAM 1 MG: 0.5 TABLET ORAL at 14:40

## 2021-03-23 RX ADMIN — DICLOFENAC SODIUM 2 G: 10 GEL TOPICAL at 11:27

## 2021-03-23 RX ADMIN — NICOTINE POLACRILEX 4 MG: 2 GUM, CHEWING BUCCAL at 16:20

## 2021-03-23 RX ADMIN — OXYCODONE HYDROCHLORIDE 5 MG: 5 TABLET ORAL at 19:54

## 2021-03-23 RX ADMIN — NICOTINE POLACRILEX 4 MG: 2 GUM, CHEWING BUCCAL at 06:58

## 2021-03-23 RX ADMIN — OMEPRAZOLE 20 MG: 20 CAPSULE, DELAYED RELEASE ORAL at 08:22

## 2021-03-23 RX ADMIN — ACETAMINOPHEN 1000 MG: 500 TABLET, FILM COATED ORAL at 12:42

## 2021-03-23 RX ADMIN — OXYCODONE HYDROCHLORIDE 10 MG: 10 TABLET, FILM COATED, EXTENDED RELEASE ORAL at 19:53

## 2021-03-23 RX ADMIN — NICOTINE POLACRILEX 4 MG: 2 GUM, CHEWING BUCCAL at 14:40

## 2021-03-23 RX ADMIN — NICOTINE POLACRILEX 4 MG: 2 GUM, CHEWING BUCCAL at 11:18

## 2021-03-23 RX ADMIN — FLUOXETINE 20 MG: 20 CAPSULE ORAL at 08:22

## 2021-03-23 RX ADMIN — Medication 1 G: at 08:21

## 2021-03-23 RX ADMIN — LIDOCAINE 2 PATCH: 560 PATCH PERCUTANEOUS; TOPICAL; TRANSDERMAL at 19:55

## 2021-03-23 ASSESSMENT — ACTIVITIES OF DAILY LIVING (ADL)
HYGIENE/GROOMING: INDEPENDENT
ORAL_HYGIENE: INDEPENDENT
HYGIENE/GROOMING: INDEPENDENT
DRESS: INDEPENDENT
ORAL_HYGIENE: INDEPENDENT
DRESS: INDEPENDENT

## 2021-03-23 ASSESSMENT — MIFFLIN-ST. JEOR: SCORE: 2177.11

## 2021-03-23 NOTE — PLAN OF CARE
Patient states he is upset that he can't have an increase in pain meds or a change of something different for meds.  Requesting klonepin and it was given.  Visible in the lounge but hasn't attended group today.  PT consulted for a TENS unit but patient states he has one at home and doesn't use because it is not helpful.
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
## 2021-03-23 NOTE — PROGRESS NOTES
Patient wants increased pain meds. Feels his pain is out of control. Notes no new pain, flair of chronic pain.    Discussed with his OP provider who has good relationship with patient.    Offered lyrica, patient declined. Offered PT consult and TENS unit, patient declined. Added lidoderm patch instead of cream as patient isn't using. Discussed with RN, please encourage topicals for pain relief.     Samantha Ayala PA-C

## 2021-03-23 NOTE — PLAN OF CARE
Pt is calm and cooperative.  Pt is visible in the milieu at times.  Otherwise, he is isolative to his room.  Affect is flat, somewhat bright on approach.  Rates anxiety at zero, depression rated 6-7 / 10.  Denies any SI/SIB, active or passive.  Ambulation is steady, using walker.      PRN Roxicodine 5 mg at 1957 for low back and hip pain - pt reports as helpful in reducing pain from 10 to 7.  Pt states this is normal.    Pt reports his wife will drop off scheduled Enbril injection tomorrow.

## 2021-03-23 NOTE — TELEPHONE ENCOUNTER
Reason for call:  Other   Patient called regarding (reason for call): call back  Additional comments: Pt calling to speak with nursing. States he is in the hospital and will likely be there for a few mores days. Pt would like a call back at his room number.    Phone number to reach patient:  Other phone number:  [phone]    Best Time:  anytime    Can we leave a detailed message on this number?  YES    Travel screening: Not Applicable     Elana MURO    [facility]

## 2021-03-23 NOTE — TELEPHONE ENCOUNTER
Patient calling asking to speak to a nurse about his pain and being in the ED.         Yun Hurst    [facility]

## 2021-03-23 NOTE — PLAN OF CARE
Patient rates his generalize pain at 8/10 and states his provider is going to increase his pain meds today.  Prn roxicodone given.  Rates his depression at 8/0, denies anxiety or this wish to be dead at this time.  States that he feels worthless.  Isolative to his room.  P:  continue same plan of care.    C/o increased pain again requesting roxicodone, tylenol and chlorzoxazone which were given.

## 2021-03-23 NOTE — PLAN OF CARE
Problem: Depressive Symptoms  Goal: Depressive Symptoms  Description: Signs and symptoms of listed problems will be absent or manageable.  Outcome: Improving  Goal: Social and Therapeutic (Depression)  Description: Signs and symptoms of listed problems will be absent or manageable.  Outcome: Improving     Woke up at 025 complaining of right hip  and lower back pain  0257 Roxicodone 5 mg given and Nicorette gum 4 mg.  Sat in the lounge for 1 hour and was socializing with another patient.  Went back to bed but stayed awake.  Slept for 6 hours tonight.

## 2021-03-23 NOTE — TELEPHONE ENCOUNTER
Call back to Pt.      Pt stated that he was in the hospital.  [facility].      Pt stated that he is in sever pain right now, Pt stated that the hospital will not manage his medication because he is on contract with [facility].      Pt stated that he had a long talk with the internal medicine provider and she wouldn't make any changes.      Writer discussed that [facility] was unable to make any changes to Pt's pain regime while Pt is in the hospital, that the attending provider is managing all aspects of Pt's care.      Pt stated that [facility] has done this for him in the past.      Will forward to [facility] to review and advise.    Josias Lopez, RN  Care Coordinator   [facility]

## 2021-03-24 ENCOUNTER — TELEPHONE (OUTPATIENT)
Dept: PALLIATIVE MEDICINE | Facility: CLINIC | Age: 56
End: 2021-03-24

## 2021-03-24 PROCEDURE — 124N000003 HC R&B MH SENIOR/ADOLESCENT

## 2021-03-24 PROCEDURE — 250N000013 HC RX MED GY IP 250 OP 250 PS 637: Performed by: PSYCHIATRY & NEUROLOGY

## 2021-03-24 PROCEDURE — 250N000013 HC RX MED GY IP 250 OP 250 PS 637: Performed by: EMERGENCY MEDICINE

## 2021-03-24 PROCEDURE — 250N000013 HC RX MED GY IP 250 OP 250 PS 637: Performed by: PHYSICIAN ASSISTANT

## 2021-03-24 PROCEDURE — 250N000013 HC RX MED GY IP 250 OP 250 PS 637: Performed by: NURSE PRACTITIONER

## 2021-03-24 PROCEDURE — G0177 OPPS/PHP; TRAIN & EDUC SERV: HCPCS

## 2021-03-24 RX ORDER — FOLIC ACID 1 MG/1
5 TABLET ORAL DAILY
Qty: 150 TABLET | Refills: 3 | Status: SHIPPED | OUTPATIENT
Start: 2021-03-25 | End: 2022-03-21

## 2021-03-24 RX ORDER — PRAZOSIN HYDROCHLORIDE 1 MG/1
1 CAPSULE ORAL AT BEDTIME
Qty: 30 CAPSULE | Refills: 3 | Status: SHIPPED | OUTPATIENT
Start: 2021-03-24 | End: 2021-05-28

## 2021-03-24 RX ORDER — OXYCODONE HYDROCHLORIDE 5 MG/1
5 TABLET ORAL EVERY 6 HOURS PRN
Status: DISCONTINUED | OUTPATIENT
Start: 2021-03-24 | End: 2021-03-25

## 2021-03-24 RX ORDER — LIDOCAINE 4 G/G
1-3 PATCH TOPICAL EVERY 24 HOURS
Qty: 90 PATCH | Refills: 3 | Status: SHIPPED | OUTPATIENT
Start: 2021-03-24 | End: 2021-06-02

## 2021-03-24 RX ORDER — FLUOXETINE 40 MG/1
40 CAPSULE ORAL DAILY
Qty: 30 CAPSULE | Refills: 3 | Status: SHIPPED | OUTPATIENT
Start: 2021-03-25 | End: 2021-04-23

## 2021-03-24 RX ORDER — LIOTHYRONINE SODIUM 25 UG/1
25 TABLET ORAL DAILY
Qty: 30 TABLET | Refills: 3 | Status: SHIPPED | OUTPATIENT
Start: 2021-03-25 | End: 2021-06-29

## 2021-03-24 RX ADMIN — PRAZOSIN HYDROCHLORIDE 1 MG: 1 CAPSULE ORAL at 21:12

## 2021-03-24 RX ADMIN — OXYCODONE HYDROCHLORIDE 5 MG: 5 TABLET ORAL at 14:42

## 2021-03-24 RX ADMIN — CHLORZOXAZONE 500 MG: 500 TABLET ORAL at 14:41

## 2021-03-24 RX ADMIN — NICOTINE POLACRILEX 4 MG: 2 GUM, CHEWING BUCCAL at 16:30

## 2021-03-24 RX ADMIN — LIDOCAINE 2 PATCH: 560 PATCH PERCUTANEOUS; TOPICAL; TRANSDERMAL at 21:12

## 2021-03-24 RX ADMIN — Medication 2 MG: at 08:12

## 2021-03-24 RX ADMIN — OXYCODONE HYDROCHLORIDE 5 MG: 5 TABLET ORAL at 08:12

## 2021-03-24 RX ADMIN — ATENOLOL 25 MG: 25 TABLET ORAL at 08:12

## 2021-03-24 RX ADMIN — DICLOFENAC SODIUM 2 G: 10 GEL TOPICAL at 21:19

## 2021-03-24 RX ADMIN — Medication 25 MG: at 21:11

## 2021-03-24 RX ADMIN — FOLIC ACID 5 MG: 1 TABLET ORAL at 08:12

## 2021-03-24 RX ADMIN — NICOTINE POLACRILEX 4 MG: 2 GUM, CHEWING BUCCAL at 09:55

## 2021-03-24 RX ADMIN — CHLORZOXAZONE 500 MG: 500 TABLET ORAL at 21:11

## 2021-03-24 RX ADMIN — Medication 125 MCG: at 08:12

## 2021-03-24 RX ADMIN — NICOTINE POLACRILEX 4 MG: 2 GUM, CHEWING BUCCAL at 21:11

## 2021-03-24 RX ADMIN — NICOTINE POLACRILEX 4 MG: 2 GUM, CHEWING BUCCAL at 19:16

## 2021-03-24 RX ADMIN — OXYCODONE HYDROCHLORIDE 10 MG: 10 TABLET, FILM COATED, EXTENDED RELEASE ORAL at 08:12

## 2021-03-24 RX ADMIN — CLONAZEPAM 1 MG: 0.5 TABLET ORAL at 09:55

## 2021-03-24 RX ADMIN — Medication 1 G: at 08:12

## 2021-03-24 RX ADMIN — Medication 25 MG: at 21:17

## 2021-03-24 RX ADMIN — OXYCODONE HYDROCHLORIDE 10 MG: 10 TABLET, FILM COATED, EXTENDED RELEASE ORAL at 19:38

## 2021-03-24 RX ADMIN — FLUOXETINE 20 MG: 20 CAPSULE ORAL at 12:18

## 2021-03-24 RX ADMIN — OMEPRAZOLE 20 MG: 20 CAPSULE, DELAYED RELEASE ORAL at 08:12

## 2021-03-24 RX ADMIN — NICOTINE POLACRILEX 4 MG: 2 GUM, CHEWING BUCCAL at 03:16

## 2021-03-24 RX ADMIN — NICOTINE POLACRILEX 4 MG: 2 GUM, CHEWING BUCCAL at 12:18

## 2021-03-24 RX ADMIN — FLUOXETINE 20 MG: 20 CAPSULE ORAL at 08:12

## 2021-03-24 RX ADMIN — OXYCODONE HYDROCHLORIDE 5 MG: 5 TABLET ORAL at 19:38

## 2021-03-24 RX ADMIN — NICOTINE POLACRILEX 4 MG: 2 GUM, CHEWING BUCCAL at 17:32

## 2021-03-24 RX ADMIN — NICOTINE POLACRILEX 4 MG: 2 GUM, CHEWING BUCCAL at 08:12

## 2021-03-24 RX ADMIN — NICOTINE POLACRILEX 4 MG: 2 GUM, CHEWING BUCCAL at 14:41

## 2021-03-24 RX ADMIN — LIOTHYRONINE SODIUM 25 MCG: 25 TABLET ORAL at 08:12

## 2021-03-24 RX ADMIN — MELATONIN TAB 3 MG 3 MG: 3 TAB at 21:17

## 2021-03-24 RX ADMIN — DICLOFENAC SODIUM 2 G: 10 GEL TOPICAL at 08:13

## 2021-03-24 RX ADMIN — MULTIPLE VITAMINS W/ MINERALS TAB 1 TABLET: TAB at 08:12

## 2021-03-24 RX ADMIN — ACETAMINOPHEN 1000 MG: 500 TABLET, FILM COATED ORAL at 19:17

## 2021-03-24 ASSESSMENT — ACTIVITIES OF DAILY LIVING (ADL)
ORAL_HYGIENE: INDEPENDENT
DRESS: INDEPENDENT
HYGIENE/GROOMING: INDEPENDENT

## 2021-03-24 NOTE — PLAN OF CARE
"  Problem: OT General Care Plan  Goal: OT Goal 1  Description: Will consistently attend OT groups and improve coping strategies with increasing repertoire of ideas and understanding of symptoms of when to use the strategies.         Note:   Attended 2 of 2 OT groups. He participated for 60 minutes in a goal directed task group with 3 pts. He was pleasant, and successfully learned 3 new activities requiring using complex problem solving using visuospatial concepts, on the IPAD. He talked about the pain he experiences in the joints in his body. He participated for 45 minutes with 3 pts in a group focused on Gratitude and identifying people, things to be grateful for and what having gratitude provides them. He spoke about the support he receives from his family to be greatly appreciated. He chose a word of affirmation to focus on the day with being \"Problem solver\" because \"I have some things I need to figure out today\". He offered to speak up first during the activity, appeared comfortable and interested in being involved. He stated reason for admission as MDD and SI. He explained \"don't have the motivation or energy\" to do things. Changes he hopes for at time of discharge is \"to feel better\". He did not identify any personal strengths at this time. OT goals he chose to focus on included deal with frustration more effectively, increase motivation, concentration and express feelings better. Plan: Provide structure, support, and encouragement. Offer education on coping strategies and life management skills. Assist pt to increase self awareness regarding mental health issues, identifiers of when using the skills might be helpful and opportunities to practice skills. Provide opportunities for more success oriented complex and successful outcomes to build self compassion.            "

## 2021-03-24 NOTE — PLAN OF CARE
Mood much better after knowing he could have his roxicodone every 4 hrs today but also knows it will be decreased back to tid prn tomorrow.  Accepted lidocaine patches tonight and took prn med for muscle spasm.    Also much happier after Dr ordered that he could wear his own slippers and pants which all have strings on them.  P:  continue same plan of care.

## 2021-03-24 NOTE — TELEPHONE ENCOUNTER
Spoke via phone with Dr. Saul Quesada, patient's inpatient psychiatrist re: recommendation for switch to Suboxone given patient's recent suicidal ideation and current opiates are no longer working well, possible development of opiate-induced hyperalgesia and safety concerns with continuing opiate medication in light of recent psychiatric decompensation. I feel it would be in patient's best interest to try to complete switch to Suboxone while patient is inpatient so he has more support while transition occurs. Dr. Quesada willing to implement plan if patient is willing to do so. I will speak with patient via phone since [facility] Breen is currently an inpatient. I have additionally consulted with Dr. Franklin Shine to see if inpatient addiction medicine consult can be done (this is not an option). I also discussed recommended dosing switch with Dr. Shanae Chen who is one of my partners in pain management and she also works in addiction medicine managing patients on Suboxone for pain and/or substance use disorder. Given patient is on 52mg of OME/day (OME (oral morphine equivalent, also known as morphine milligram equivalent-MME), patient may require Suboxone 8mg/2mg per day given as half-strips twice daily and would need to abstain from opiate medication for 24 hours to avoid precipitated opiate withdrawal after consultation with Dr. Chen.     Call placed to patient on inpatient floor. Able to have private phone conversation with patient re: my recommendation to switch from oxycodone (OxyContin and oxycodone IR) over to Suboxone as a markedly safer option for pain medication for him going forward. I am not willing to continue prescribing full mu-agonist opiates for patient from a safety standpoint. 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 Patient states he had previously been on Suboxone in the past. He states his experience with Suboxone was at [facility] when he was there for treatment of his alcohol use disorder following a DUI. Les had not shared with me previously that he had tried Suboxone in the past. Les states \"it did not work well.\"   Option given to patient is transition to Suboxone as an inpatient and I can take over his outpatient Suboxone management. Opiate medication other than Suboxone would no longer be entertained OR I can give him a taper plan to taper off of his current doses of oxycodone to avoid opiate withdrawal. Patient expressed frustration, feels should be allowed to continue opiates. Wants to know what will be done for his pain if \"this suboxone doesn't work\". Reviewed if not helpful, would titrate Subxone as outpatient and if upward titration not effective, then would taper off of Suboxone and would NOT restart opiates (full mu opiate agonist meds). Emphasized firmness in my decision-making for his safety. Reviewed would need to abstain from opiates for 24 hours before starting Suboxone as an inpatient.  Patient stated \"well, I guess we can begin tomorrow.\"     Contacted Dr. Quesada via phone to review patient is willing to do Suboxone transition while inpatient. If patient stops opiates tomorrow morning, patient could feasibly begin Suboxone on Friday morning. Reviewed I would recommend Suboxone 8/2mg films and use one-half film twice daily. Dr. Quesada is very familiar with Suboxone initiation and usually uses 2mg film to begin and repeats 2mg dosage 2 hours later if needed. This is also completely reasonable. Dr. Quesada willing to prescribe for patient when he is discharged home until April 2nd, 2021. I will take over at that time. Asked Dr. Quesada if he would be OK with my titrating Suboxone dosage higher IF NEEDED once patient is discharged home. Dr. Quesada is OK with this. Reviewed with " patient and with Dr. Quesada that I am the provider on-call for the [facility] this weekend and I can be reached via pager 24 hours per day.     Susana GRANT, RN CNP, FNP  [facility]

## 2021-03-24 NOTE — PROGRESS NOTES
03/24/21 1300   General Information   Date Initially Attended OT 03/24/21   Clinical Impression   Affect Appropriate to situation;Flat   Orientation Oriented to person, place and time   Appearance and ADLs General cleanliness observed in most areas   Attention to Internal Stimuli No observed signs   Interaction Skills Initiates appropriately with staff;Interacts appropriately with peers   Ability to Communicate Needs Independent   Verbal Content Clear;Appropriate to topic;Articulate   Ability to Maintain Boundaries Maintains appropriate physical boundaries;Maintains appropriate verbal boundaries   Participation Initiates participation   Concentration Concentrates 50 minutes   Ability to Concentrate With structure;Needs further assessment   Follows and Comprehends Directions Independently follows multi-step directions   Memory Delayed and immediate recall intact;Needs further assessment   Organization Independently organizes all tasks   Decision Making Independent   Planning and Problem Solving Independently plans ahead   Ability to Apply and Learn Concepts Applies within group structure   Frustrations / Stress Tolerance Independently identifies sources of frustration/stress   Level of Insight Insightful into needs, issues, goals   Self Esteem Poor self esteem   Social Supports Identifies utilizing supports   General Observation/Plan   General Observations/Plan See Comments   See other OT note with goals set.

## 2021-03-24 NOTE — PLAN OF CARE
Problem: Adult Inpatient Plan of Care  Goal: Optimal Comfort and Wellbeing  Outcome: No Change     Received sleeping comfortably in bed, as soon he he woke up, complained of right hip and lower back pains.  2351 Oxycodone 5 mg given, fell right back to sleep.  Ambulated with a wheeled walker.  0316 Nicotine 4 mg gums given per request, reported less pain .

## 2021-03-24 NOTE — PROGRESS NOTES
"Patient seen, chart reviewed, care discussed with staff.  Blood pressure 119/78, pulse 59, temperature 97.3  F (36.3  C), resp. rate 16, height 1.93 m (6' 4\"), weight 123.1 kg (271 lb 4.8 oz), SpO2 95 %.  Pain concerns continue.  Pain appt was re-scheduled for Friday 3/26.  He feels this is very important.    General appearance: good  Alert.   Affect: fair  Mood: fair    Speech:  normal.   Eye contact:  good.    Psychomotor behavior: normal  Gait: normal.    Abnormal movements: none  Delusions: none  Hallucinations:   none  Thoughts: logical  Associations: intact  Judgement: good  Insight: good  Cognitions: intact in conversation  Memory:  intact in conversation  Orientation: normal    Not suicidal.    He notes he has failed Suboxone for pain, and does not get much with Toredol.      Plan: increase Prozac to 40mg, he was on more than 20mg in the past  2.  Will check with Susana Pike, pain provider, to see if he can have the appointment while here, or if their service can consult, or if she has other IP recommendations.        Current Facility-Administered Medications:      acetaminophen (TYLENOL) tablet 1,000 mg, 1,000 mg, Oral, Q6H PRN, Melissa Nails APRN CNP, 1,000 mg at 03/23/21 1242     ARIPiprazole (ABILIFY) tablet 2 mg, 2 mg, Oral, Daily, Patricia Andrews MD, 2 mg at 03/24/21 0812     atenolol (TENORMIN) tablet 25 mg, 25 mg, Oral, Daily, Patricia Andrews MD, 25 mg at 03/24/21 0812     chlorzoxazone (PARAFON FORTE) tablet 500 mg, 500 mg, Oral, TID PRN, Patricia Andrews MD, 500 mg at 03/23/21 2131     cholecalciferol (VITAMIN D3) 125 mcg (5000 units) capsule 125 mcg, 125 mcg, Oral, Daily, Patricia Andrews MD, 125 mcg at 03/24/21 0812     clonazePAM (klonoPIN) tablet 0.5-1 mg, 0.5-1 mg, Oral, Daily PRN, Melissa Nails APRN CNP, 1 mg at 03/24/21 0955     diclofenac (VOLTAREN) 1 % topical gel 2 g, 2 g, Topical, 4x Daily, Melissa Nails APRN CNP, 2 g at 03/24/21 " 0813     etanercept (ENBREL) 50 MG/ML injection 50 mg, 50 mg, Subcutaneous, Weekly, Melissa Nails APRN CNP, 50 mg at 03/23/21 1818     fish oil-omega-3 fatty acids capsule 1 g, 1 g, Oral, Daily, Melissa Nails APRN CNP, 1 g at 03/24/21 0812     FLUoxetine (PROzac) capsule 20 mg, 20 mg, Oral, Once, Saul Quesada MD     [START ON 3/25/2021] FLUoxetine (PROzac) capsule 40 mg, 40 mg, Oral, Daily, Saul Quesada MD     folic acid (FOLVITE) tablet 5 mg, 5 mg, Oral, Daily, Saul Quesada MD, 5 mg at 03/24/21 0812     Lidocaine (LIDOCARE) 4 % Patch 1-3 patch, 1-3 patch, Transdermal, Q24h, 2 patch at 03/23/21 1955 **AND** lidocaine patch in PLACE, , Transdermal, Q8H, Samantha Zabala PA-C     liothyronine (CYTOMEL) tablet 25 mcg, 25 mcg, Oral, Daily, Saul Quesada MD, 25 mcg at 03/24/21 0812     melatonin tablet 3 mg, 3 mg, Oral, At Bedtime, Melissa Nails APRN CNP, 3 mg at 03/23/21 2118     menthol (Topical Analgesic) 2.5% (BENGAY VANISHIN SCENT) 2.5 % topical gel, , Topical, Q6H PRN, Samantha Zabala PA-C     methotrexate tablet 10 mg, 10 mg, Oral, Q7 Days, Melissa Nails APRN CNP, 10 mg at 03/23/21 1814     multivitamin w/minerals (THERA-VIT-M) tablet 1 tablet, 1 tablet, Oral, Daily, Patricia Andrews MD, 1 tablet at 03/24/21 0812     naloxone (NARCAN) injection 0.2 mg, 0.2 mg, Intravenous, Q2 Min PRN **OR** naloxone (NARCAN) injection 0.4 mg, 0.4 mg, Intravenous, Q2 Min PRN **OR** naloxone (NARCAN) injection 0.2 mg, 0.2 mg, Intramuscular, Q2 Min PRN **OR** naloxone (NARCAN) injection 0.4 mg, 0.4 mg, Intramuscular, Q2 Min PRN, Saul Quesada MD     nicotine (NICORETTE) gum 4 mg, 4 mg, Buccal, Q1H PRN, Melissa Nails APRN CNP, 4 mg at 03/24/21 0955     omeprazole (priLOSEC) CR capsule 20 mg, 20 mg, Oral, QAM AC, Melissa Nails APRN CNP, 20 mg at 03/24/21 0812     oxyCODONE (oxyCONTIN) 12 hr tablet 10 mg, 10 mg, Oral, Q12H, Melissa Nails  JUANITA Beebe CNP, 10 mg at 03/24/21 0812     oxyCODONE (ROXICODONE) tablet 5 mg, 5 mg, Oral, Q6H PRN, Samantha Zabala PA-C     prazosin (MINIPRESS) capsule 1 mg, 1 mg, Oral, At Bedtime, Saul Quesada MD, 1 mg at 03/23/21 2121     traZODone (DESYREL) half-tab 25 mg, 25 mg, Oral, At Bedtime, Melissa Nails APRN CNP, 25 mg at 03/23/21 2118  Recent Results (from the past 168 hour(s))   Drug abuse screen 6 urine (tox)    Collection Time: 03/21/21  2:05 AM   Result Value Ref Range    Amphetamine Qual Urine Negative NEG^Negative    Barbiturates Qual Urine Negative NEG^Negative    Benzodiazepine Qual Urine Negative NEG^Negative    Cannabinoids Qual Urine Negative NEG^Negative    Cocaine Qual Urine Negative NEG^Negative    Ethanol Qual Urine Negative NEG^Negative    Opiates Qualitative Urine Negative NEG^Negative   Asymptomatic Influenza A/B & SARS-CoV2 (COVID-19) Virus PCR Multiplex    Collection Time: 03/21/21  6:34 AM    Specimen: Nasopharyngeal   Result Value Ref Range    Flu A/B & SARS-COV-2 PCR Source Nasopharyngeal     SARS-CoV-2 PCR Result NEGATIVE     Influenza A PCR Negative NEG^Negative    Influenza B PCR Negative NEG^Negative    Respiratory Syncytial Virus PCR (Note)     Flu A/B & SARS-CoV-2 PCR Comment (Note)    CBC with platelets differential    Collection Time: 03/21/21  8:08 AM   Result Value Ref Range    WBC 6.8 4.0 - 11.0 10e9/L    RBC Count 5.18 4.4 - 5.9 10e12/L    Hemoglobin 15.6 13.3 - 17.7 g/dL    Hematocrit 45.7 40.0 - 53.0 %    MCV 88 78 - 100 fl    MCH 30.1 26.5 - 33.0 pg    MCHC 34.1 31.5 - 36.5 g/dL    RDW 13.1 10.0 - 15.0 %    Platelet Count 246 150 - 450 10e9/L    Diff Method Automated Method     % Neutrophils 53.7 %    % Lymphocytes 31.9 %    % Monocytes 8.9 %    % Eosinophils 4.2 %    % Basophils 1.0 %    % Immature Granulocytes 0.3 %    Nucleated RBCs 0 0 /100    Absolute Neutrophil 3.7 1.6 - 8.3 10e9/L    Absolute Lymphocytes 2.2 0.8 - 5.3 10e9/L    Absolute Monocytes 0.6  0.0 - 1.3 10e9/L    Absolute Eosinophils 0.3 0.0 - 0.7 10e9/L    Absolute Basophils 0.1 0.0 - 0.2 10e9/L    Abs Immature Granulocytes 0.0 0 - 0.4 10e9/L    Absolute Nucleated RBC 0.0    Comprehensive metabolic panel    Collection Time: 03/21/21  8:08 AM   Result Value Ref Range    Sodium 141 133 - 144 mmol/L    Potassium 4.0 3.4 - 5.3 mmol/L    Chloride 108 94 - 109 mmol/L    Carbon Dioxide 29 20 - 32 mmol/L    Anion Gap 4 3 - 14 mmol/L    Glucose 95 70 - 99 mg/dL    Urea Nitrogen 12 7 - 30 mg/dL    Creatinine 0.92 0.66 - 1.25 mg/dL    GFR Estimate >90 >60 mL/min/[1.73_m2]    GFR Estimate If Black >90 >60 mL/min/[1.73_m2]    Calcium 8.3 (L) 8.5 - 10.1 mg/dL    Bilirubin Total 0.7 0.2 - 1.3 mg/dL    Albumin 3.5 3.4 - 5.0 g/dL    Protein Total 7.2 6.8 - 8.8 g/dL    Alkaline Phosphatase 100 40 - 150 U/L    ALT 34 0 - 70 U/L    AST 20 0 - 45 U/L   TSH with free T4 reflex    Collection Time: 03/21/21  8:08 AM   Result Value Ref Range    TSH 2.39 0.40 - 4.00 mU/L

## 2021-03-24 NOTE — TELEPHONE ENCOUNTER
Miar called Pt and informed    Josias Lopez, RN  Care Coordinator   [facility] [place]

## 2021-03-24 NOTE — PLAN OF CARE
"Patient was calm, social and attending group activities. He endorses depression at 7/10, denies anxiety and SI/SIB. Patient talked at length about his frustration with chronic pain and not being able to have enough pain medications to control the pain. Patient was tearful when talking about his pain and how it has negatively impacted his well being. He stated \"they treat chronic pain like it doesn't exist\". Patient was given OxyContin 10 mg scheduled and PRN oxycodone 5 mg to help with pain. Patient also requested and was given PRN Clonopin mg for anxiety. Encouraged patient to talk with his provider.   "

## 2021-03-24 NOTE — PROGRESS NOTES
Patient to discuss suboxone with Dr. Quesada his primary psychiatrist. Can offer toradol (doesn't want). Also declined lyrica, TENS unit. Encourage topicals. Prefer not to increase chronic pain meds as this is chronic pain.     Samantha Ayala PA-C     Addendum: Please see Dr. Bee note for plan.     Medicine will sign off. Please notify on call [facility] if any intercurrent medical issues arise.

## 2021-03-25 PROCEDURE — 250N000013 HC RX MED GY IP 250 OP 250 PS 637: Performed by: NURSE PRACTITIONER

## 2021-03-25 PROCEDURE — 250N000013 HC RX MED GY IP 250 OP 250 PS 637: Performed by: PSYCHIATRY & NEUROLOGY

## 2021-03-25 PROCEDURE — 250N000013 HC RX MED GY IP 250 OP 250 PS 637: Performed by: EMERGENCY MEDICINE

## 2021-03-25 PROCEDURE — 124N000003 HC R&B MH SENIOR/ADOLESCENT

## 2021-03-25 RX ORDER — BUPRENORPHINE 2 MG/1
4 TABLET SUBLINGUAL 2 TIMES DAILY
Status: DISCONTINUED | OUTPATIENT
Start: 2021-03-26 | End: 2021-03-27 | Stop reason: HOSPADM

## 2021-03-25 RX ORDER — BUPRENORPHINE 2 MG/1
2 TABLET SUBLINGUAL ONCE
Status: COMPLETED | OUTPATIENT
Start: 2021-03-26 | End: 2021-03-26

## 2021-03-25 RX ORDER — LORAZEPAM 1 MG/1
2 TABLET ORAL EVERY 4 HOURS PRN
Status: DISCONTINUED | OUTPATIENT
Start: 2021-03-25 | End: 2021-03-27 | Stop reason: HOSPADM

## 2021-03-25 RX ORDER — KETOROLAC TROMETHAMINE 10 MG/1
10 TABLET, FILM COATED ORAL EVERY 4 HOURS PRN
Status: DISCONTINUED | OUTPATIENT
Start: 2021-03-25 | End: 2021-03-27 | Stop reason: HOSPADM

## 2021-03-25 RX ORDER — CLONIDINE HYDROCHLORIDE 0.1 MG/1
0.1 TABLET ORAL EVERY 6 HOURS PRN
Status: DISCONTINUED | OUTPATIENT
Start: 2021-03-25 | End: 2021-03-27 | Stop reason: HOSPADM

## 2021-03-25 RX ADMIN — Medication 1 G: at 08:02

## 2021-03-25 RX ADMIN — NICOTINE POLACRILEX 4 MG: 2 GUM, CHEWING BUCCAL at 19:16

## 2021-03-25 RX ADMIN — KETOROLAC TROMETHAMINE 10 MG: 10 TABLET, FILM COATED ORAL at 11:56

## 2021-03-25 RX ADMIN — NICOTINE POLACRILEX 4 MG: 2 GUM, CHEWING BUCCAL at 18:05

## 2021-03-25 RX ADMIN — LIOTHYRONINE SODIUM 25 MCG: 25 TABLET ORAL at 08:02

## 2021-03-25 RX ADMIN — PRAZOSIN HYDROCHLORIDE 1 MG: 1 CAPSULE ORAL at 22:30

## 2021-03-25 RX ADMIN — LORAZEPAM 2 MG: 1 TABLET ORAL at 18:04

## 2021-03-25 RX ADMIN — MULTIPLE VITAMINS W/ MINERALS TAB 1 TABLET: TAB at 08:02

## 2021-03-25 RX ADMIN — MELATONIN TAB 3 MG 3 MG: 3 TAB at 22:30

## 2021-03-25 RX ADMIN — NICOTINE POLACRILEX 4 MG: 2 GUM, CHEWING BUCCAL at 15:18

## 2021-03-25 RX ADMIN — DICLOFENAC SODIUM 2 G: 10 GEL TOPICAL at 08:10

## 2021-03-25 RX ADMIN — ATENOLOL 25 MG: 25 TABLET ORAL at 08:03

## 2021-03-25 RX ADMIN — FOLIC ACID 5 MG: 1 TABLET ORAL at 08:02

## 2021-03-25 RX ADMIN — NICOTINE POLACRILEX 4 MG: 2 GUM, CHEWING BUCCAL at 13:35

## 2021-03-25 RX ADMIN — NICOTINE POLACRILEX 4 MG: 2 GUM, CHEWING BUCCAL at 20:20

## 2021-03-25 RX ADMIN — KETOROLAC TROMETHAMINE 10 MG: 10 TABLET, FILM COATED ORAL at 22:30

## 2021-03-25 RX ADMIN — Medication 25 MG: at 22:30

## 2021-03-25 RX ADMIN — CLONAZEPAM 1 MG: 0.5 TABLET ORAL at 02:55

## 2021-03-25 RX ADMIN — Medication 2 MG: at 08:02

## 2021-03-25 RX ADMIN — NICOTINE POLACRILEX 4 MG: 2 GUM, CHEWING BUCCAL at 03:01

## 2021-03-25 RX ADMIN — OMEPRAZOLE 20 MG: 20 CAPSULE, DELAYED RELEASE ORAL at 08:02

## 2021-03-25 RX ADMIN — NICOTINE POLACRILEX 4 MG: 2 GUM, CHEWING BUCCAL at 11:56

## 2021-03-25 RX ADMIN — Medication 125 MCG: at 08:02

## 2021-03-25 RX ADMIN — LORAZEPAM 2 MG: 1 TABLET ORAL at 13:35

## 2021-03-25 RX ADMIN — NICOTINE POLACRILEX 4 MG: 2 GUM, CHEWING BUCCAL at 21:48

## 2021-03-25 RX ADMIN — FLUOXETINE 40 MG: 20 CAPSULE ORAL at 08:02

## 2021-03-25 RX ADMIN — NICOTINE POLACRILEX 4 MG: 2 GUM, CHEWING BUCCAL at 08:32

## 2021-03-25 RX ADMIN — LORAZEPAM 2 MG: 1 TABLET ORAL at 09:13

## 2021-03-25 RX ADMIN — LORAZEPAM 2 MG: 1 TABLET ORAL at 22:30

## 2021-03-25 RX ADMIN — KETOROLAC TROMETHAMINE 10 MG: 10 TABLET, FILM COATED ORAL at 18:05

## 2021-03-25 ASSESSMENT — ACTIVITIES OF DAILY LIVING (ADL)
HYGIENE/GROOMING: INDEPENDENT
ORAL_HYGIENE: INDEPENDENT
HYGIENE/GROOMING: INDEPENDENT
DRESS: INDEPENDENT
LAUNDRY: UNABLE TO COMPLETE
ORAL_HYGIENE: INDEPENDENT
DRESS: INDEPENDENT

## 2021-03-25 ASSESSMENT — MIFFLIN-ST. JEOR: SCORE: 2193.89

## 2021-03-25 NOTE — PLAN OF CARE
Problem: OT General Care Plan  Goal: OT Goal 1  Description: Will consistently attend OT groups and improve coping strategies with increasing repertoire of ideas and understanding of symptoms of when to use the strategies.         Note: Pt did not attend any OT groups today stating his pain level to be too uncomfortable. In the afternoon, he stated the pain being higher and more difficult. We discussed positions and activity levels that he thought may make a difference. He explained having his feet elevated in the recliner perhaps made a difference. We discussed different coping ideas, considering trying the list of ideas on the Coping Idea poster in the lounge to experiment if anything there might have an impact for this afternoon or tonight. He stated concerns the new medications starting tomorrow he has tried before and now his pain is worse than at that time. He seems overwhelmed by the pain and the effect it has on him. RN was notified of this discussion.

## 2021-03-25 NOTE — PROGRESS NOTES
"Patient seen via telemedicine.  Care discussed with treatment team staff.  Blood pressure 116/68, pulse 66, temperature 98  F (36.7  C), resp. rate 16, height 1.93 m (6' 4\"), weight 123.1 kg (271 lb 4.8 oz), SpO2 96 %.    Care discussed with pain specialist Susana Pike.  She would like to have him try Suboxone.  He notes he took Suboxone for 3 months in 2004 while in long term treatment in 2004, without benefit.    Suboxone is listed as an \"allergy: due to itch (see below)    General appearance: stressed  Alert.   Affect: fair  Mood: discouraged, depressed   Speech:  normal.   Eye contact:  good.    Psychomotor behavior: normal  Gait: not observed.    Abnormal movements: none  Delusions: none  Hallucinations:  none  Thoughts: logical, fearful of pain  Associations: intact  Judgement: fair  Insight: fair  Cognitions: intact in conversation  Memory:  intact in conversation  Orientation: normal    Not suicidal, but he feels asking him to live forever in pain is \"a death sentence\".    Plan: Stop opiates  2.  Clonidine prn for withdrawal  3.  Ativan prn for anxiety during opiate withdrawal (in addition to scheduled Klonopin)  4.  Toredol oral prn for pain   5.  Start Buprenorphine test dose 0810 tomorrow, if tolearted start 4mg BID at 0910.  Itching was probably from the Naloxone in Suboxone, not the Buprenorphine.      Current Facility-Administered Medications:      acetaminophen (TYLENOL) tablet 1,000 mg, 1,000 mg, Oral, Q6H PRN, Melissa Nails, APRN CNP, 1,000 mg at 03/24/21 1917     ARIPiprazole (ABILIFY) tablet 2 mg, 2 mg, Oral, Daily, Patricia Andrews MD, 2 mg at 03/25/21 0802     atenolol (TENORMIN) tablet 25 mg, 25 mg, Oral, Daily, Patricia Andrews MD, 25 mg at 03/25/21 0803     [START ON 3/26/2021] buprenorphine (SUBUTEX) sublingual half-tab 4 mg, 4 mg, Sublingual, BID, Saul Quesada MD     [START ON 3/26/2021] buprenorphine (SUBUTEX) sublingual tablet 2 mg, 2 mg, Sublingual, Once, Dipak, " Saul CHAKRABORTY MD     chlorzoxazone (PARAFON FORTE) tablet 500 mg, 500 mg, Oral, TID PRN, Patricia Andrews MD, 500 mg at 03/24/21 2111     cholecalciferol (VITAMIN D3) 125 mcg (5000 units) capsule 125 mcg, 125 mcg, Oral, Daily, Patricia Andrews MD, 125 mcg at 03/25/21 0802     clonazePAM (klonoPIN) tablet 0.5-1 mg, 0.5-1 mg, Oral, Daily PRN, Melissa Nails APRN CNP, 1 mg at 03/25/21 0255     cloNIDine (CATAPRES) tablet 0.1 mg, 0.1 mg, Oral, Q6H PRN, Saul Quesada MD     diclofenac (VOLTAREN) 1 % topical gel 2 g, 2 g, Topical, 4x Daily, Melissa Nails APRN CNP, 2 g at 03/25/21 0810     etanercept (ENBREL) 50 MG/ML injection 50 mg, 50 mg, Subcutaneous, Weekly, Melissa Nails APRN CNP, 50 mg at 03/23/21 1818     fish oil-omega-3 fatty acids capsule 1 g, 1 g, Oral, Daily, Melissa Nails APRN CNP, 1 g at 03/25/21 0802     FLUoxetine (PROzac) capsule 40 mg, 40 mg, Oral, Daily, Saul Quesada MD, 40 mg at 03/25/21 0802     folic acid (FOLVITE) tablet 5 mg, 5 mg, Oral, Daily, Saul Quesada MD, 5 mg at 03/25/21 0802     ketorolac (TORADOL) tablet 10 mg, 10 mg, Oral, Q4H PRN, Saul Quesada MD     Lidocaine (LIDOCARE) 4 % Patch 1-3 patch, 1-3 patch, Transdermal, Q24h, 2 patch at 03/24/21 2112 **AND** lidocaine patch in PLACE, , Transdermal, Q8H, Samantha Zabala PA-C     liothyronine (CYTOMEL) tablet 25 mcg, 25 mcg, Oral, Daily, Saul Quesada MD, 25 mcg at 03/25/21 0802     LORazepam (ATIVAN) tablet 2 mg, 2 mg, Oral, Q4H PRN, Saul Quesada MD     melatonin tablet 3 mg, 3 mg, Oral, At Bedtime, Melissa Nails APRN CNP, 3 mg at 03/24/21 2117     menthol (Topical Analgesic) 2.5% (BENGAY VANISHIN SCENT) 2.5 % topical gel, , Topical, Q6H PRN, Samantha Zabala PA-C     methotrexate tablet 10 mg, 10 mg, Oral, Q7 Days, Melissa Nalis APRN CNP, 10 mg at 03/23/21 1814     multivitamin w/minerals (THERA-VIT-M) tablet 1 tablet, 1 tablet, Oral, Daily,  Patricia Andrews MD, 1 tablet at 03/25/21 0802     naloxone (NARCAN) injection 0.2 mg, 0.2 mg, Intravenous, Q2 Min PRN **OR** naloxone (NARCAN) injection 0.4 mg, 0.4 mg, Intravenous, Q2 Min PRN **OR** naloxone (NARCAN) injection 0.2 mg, 0.2 mg, Intramuscular, Q2 Min PRN **OR** naloxone (NARCAN) injection 0.4 mg, 0.4 mg, Intramuscular, Q2 Min PRN, Saul Quesada MD     nicotine (NICORETTE) gum 4 mg, 4 mg, Buccal, Q1H PRN, Melissa Nails APRN CNP, 4 mg at 03/25/21 0301     omeprazole (priLOSEC) CR capsule 20 mg, 20 mg, Oral, QAM AC, Melissa Nails APRN CNP, 20 mg at 03/25/21 0802     prazosin (MINIPRESS) capsule 1 mg, 1 mg, Oral, At Bedtime, Saul Quesada MD, 1 mg at 03/24/21 2112     traZODone (DESYREL) half-tab 25 mg, 25 mg, Oral, At Bedtime, Melissa Nails APRN CNP, 25 mg at 03/24/21 2117  Recent Results (from the past 168 hour(s))   Drug abuse screen 6 urine (tox)    Collection Time: 03/21/21  2:05 AM   Result Value Ref Range    Amphetamine Qual Urine Negative NEG^Negative    Barbiturates Qual Urine Negative NEG^Negative    Benzodiazepine Qual Urine Negative NEG^Negative    Cannabinoids Qual Urine Negative NEG^Negative    Cocaine Qual Urine Negative NEG^Negative    Ethanol Qual Urine Negative NEG^Negative    Opiates Qualitative Urine Negative NEG^Negative   Asymptomatic Influenza A/B & SARS-CoV2 (COVID-19) Virus PCR Multiplex    Collection Time: 03/21/21  6:34 AM    Specimen: Nasopharyngeal   Result Value Ref Range    Flu A/B & SARS-COV-2 PCR Source Nasopharyngeal     SARS-CoV-2 PCR Result NEGATIVE     Influenza A PCR Negative NEG^Negative    Influenza B PCR Negative NEG^Negative    Respiratory Syncytial Virus PCR (Note)     Flu A/B & SARS-CoV-2 PCR Comment (Note)    CBC with platelets differential    Collection Time: 03/21/21  8:08 AM   Result Value Ref Range    WBC 6.8 4.0 - 11.0 10e9/L    RBC Count 5.18 4.4 - 5.9 10e12/L    Hemoglobin 15.6 13.3 - 17.7 g/dL    Hematocrit 45.7  40.0 - 53.0 %    MCV 88 78 - 100 fl    MCH 30.1 26.5 - 33.0 pg    MCHC 34.1 31.5 - 36.5 g/dL    RDW 13.1 10.0 - 15.0 %    Platelet Count 246 150 - 450 10e9/L    Diff Method Automated Method     % Neutrophils 53.7 %    % Lymphocytes 31.9 %    % Monocytes 8.9 %    % Eosinophils 4.2 %    % Basophils 1.0 %    % Immature Granulocytes 0.3 %    Nucleated RBCs 0 0 /100    Absolute Neutrophil 3.7 1.6 - 8.3 10e9/L    Absolute Lymphocytes 2.2 0.8 - 5.3 10e9/L    Absolute Monocytes 0.6 0.0 - 1.3 10e9/L    Absolute Eosinophils 0.3 0.0 - 0.7 10e9/L    Absolute Basophils 0.1 0.0 - 0.2 10e9/L    Abs Immature Granulocytes 0.0 0 - 0.4 10e9/L    Absolute Nucleated RBC 0.0    Comprehensive metabolic panel    Collection Time: 03/21/21  8:08 AM   Result Value Ref Range    Sodium 141 133 - 144 mmol/L    Potassium 4.0 3.4 - 5.3 mmol/L    Chloride 108 94 - 109 mmol/L    Carbon Dioxide 29 20 - 32 mmol/L    Anion Gap 4 3 - 14 mmol/L    Glucose 95 70 - 99 mg/dL    Urea Nitrogen 12 7 - 30 mg/dL    Creatinine 0.92 0.66 - 1.25 mg/dL    GFR Estimate >90 >60 mL/min/[1.73_m2]    GFR Estimate If Black >90 >60 mL/min/[1.73_m2]    Calcium 8.3 (L) 8.5 - 10.1 mg/dL    Bilirubin Total 0.7 0.2 - 1.3 mg/dL    Albumin 3.5 3.4 - 5.0 g/dL    Protein Total 7.2 6.8 - 8.8 g/dL    Alkaline Phosphatase 100 40 - 150 U/L    ALT 34 0 - 70 U/L    AST 20 0 - 45 U/L   TSH with free T4 reflex    Collection Time: 03/21/21  8:08 AM   Result Value Ref Range    TSH 2.39 0.40 - 4.00 mU/L       Video-Visit Details    Type of service:  Video Visit    Video Start Time (video started): 0805    Video End Time (time video stopped): 0825    Originating Location (pt. Location): [facility]    Distant Location (provider location): Provider remote location    Mode of Communication:  Video Conference via [employer]    Physician has received verbal consent for a Video Visit from the patient? Yes      Saul Quesada MD

## 2021-03-25 NOTE — PLAN OF CARE
"Patient very upset about opiates being taken away and being started on suboxone on Friday.  \"I need something stronger.\"  insinuating that he may become suicidal when his pain is not under control .  Took tylenol tonight along with the roxicodone and oxycontin.  Also took prn muscle relaxer at [facility].  Less irritable after receiving pain meds.  P:  Continue same plan of care.   "

## 2021-03-25 NOTE — PLAN OF CARE
"            Work Completed: Reviewed chart. Met patient to check in and he reported frustration with the change in his pain medication. He repots he was on suboxone when he went to [facility] for treatment and it did not work for him. He reported talking to his pain medication doctor last night and she informed him of the plan to replace oxycodone with suboxone. He feels frustrated and reports it's like a \"death sentence\" for him.    Discharge plan or goal: Home                Barriers to discharge: Depression needs further stabilization.   "

## 2021-03-25 NOTE — PROGRESS NOTES
"Patient woke up and requested for anti-anxiety meds. Clonopin 1 mg given as prn for anxiety. Patient went back to his room but was just reading some reading materials. Patient said that he is concern about the plan of taking off from his Oxycontin and will be starting on Suboxone. Patient further stated that \"I feel betrayed as if I am being punished of the things which I cannot control. I can't control my depression\". Explanation done the reason why he will have suboxone. Patient expressed his desire to talk with Dr. Quesada today. Patient slept for 5.25 hours the whole night.  "

## 2021-03-25 NOTE — PLAN OF CARE
"  Problem: Depressive Symptoms  Goal: Depressive Symptoms  Description: Signs and symptoms of listed problems will be absent or manageable.  Flowsheets (Taken 3/25/2021 1404)  Depressive Symptoms Assessed: all  Depressive Symptoms Present:   affect   mood   anxiety   psychomotor activity   sleep  Note: Pt denies suicidal ideation or self injury. Pt makes statements such as \" living with this pain is a death sentence\". Rates depression and anxiety a 10/10. Out for meals and groups. Ambulates with a walker. Pt met by telecom with provider. Pt stated that he did not have much hope for pain relief. Pt has requested prn Nicorette gum 4 mg's twice this shift. Pt requested and received prn Ativan 2 mg's at 0915 and 1330 for anxiety during withdrawal period. Pt's appetite is good.  Pt's goal for the day is to get pain relief. Pt also received prn Toredol for pain at 1156 with no relief. Pt states the food is horrible.     "

## 2021-03-26 ENCOUNTER — TELEPHONE (OUTPATIENT)
Dept: PALLIATIVE MEDICINE | Facility: CLINIC | Age: 56
End: 2021-03-26

## 2021-03-26 PROCEDURE — 250N000013 HC RX MED GY IP 250 OP 250 PS 637: Performed by: PSYCHIATRY & NEUROLOGY

## 2021-03-26 PROCEDURE — 250N000013 HC RX MED GY IP 250 OP 250 PS 637: Performed by: PHYSICIAN ASSISTANT

## 2021-03-26 PROCEDURE — 250N000013 HC RX MED GY IP 250 OP 250 PS 637: Performed by: NURSE PRACTITIONER

## 2021-03-26 PROCEDURE — 250N000013 HC RX MED GY IP 250 OP 250 PS 637: Performed by: EMERGENCY MEDICINE

## 2021-03-26 PROCEDURE — 124N000003 HC R&B MH SENIOR/ADOLESCENT

## 2021-03-26 RX ORDER — BUPRENORPHINE AND NALOXONE 4; 1 MG/1; MG/1
1 FILM, SOLUBLE BUCCAL; SUBLINGUAL DAILY
Qty: 28 EACH | Refills: 0 | Status: SHIPPED | OUTPATIENT
Start: 2021-03-26 | End: 2021-04-12

## 2021-03-26 RX ADMIN — NICOTINE POLACRILEX 4 MG: 2 GUM, CHEWING BUCCAL at 09:08

## 2021-03-26 RX ADMIN — BUPRENORPHINE 4 MG: 2 TABLET SUBLINGUAL at 20:21

## 2021-03-26 RX ADMIN — Medication: at 14:33

## 2021-03-26 RX ADMIN — Medication 25 MG: at 20:21

## 2021-03-26 RX ADMIN — NICOTINE POLACRILEX 4 MG: 2 GUM, CHEWING BUCCAL at 12:20

## 2021-03-26 RX ADMIN — NICOTINE POLACRILEX 4 MG: 2 GUM, CHEWING BUCCAL at 00:06

## 2021-03-26 RX ADMIN — LIOTHYRONINE SODIUM 25 MCG: 25 TABLET ORAL at 08:59

## 2021-03-26 RX ADMIN — Medication 1 G: at 08:58

## 2021-03-26 RX ADMIN — FOLIC ACID 5 MG: 1 TABLET ORAL at 08:58

## 2021-03-26 RX ADMIN — KETOROLAC TROMETHAMINE 10 MG: 10 TABLET, FILM COATED ORAL at 09:09

## 2021-03-26 RX ADMIN — KETOROLAC TROMETHAMINE 10 MG: 10 TABLET, FILM COATED ORAL at 16:50

## 2021-03-26 RX ADMIN — LORAZEPAM 2 MG: 1 TABLET ORAL at 09:08

## 2021-03-26 RX ADMIN — NICOTINE POLACRILEX 4 MG: 2 GUM, CHEWING BUCCAL at 10:06

## 2021-03-26 RX ADMIN — NICOTINE POLACRILEX 4 MG: 2 GUM, CHEWING BUCCAL at 18:34

## 2021-03-26 RX ADMIN — Medication 125 MCG: at 09:00

## 2021-03-26 RX ADMIN — DICLOFENAC SODIUM 2 G: 10 GEL TOPICAL at 18:34

## 2021-03-26 RX ADMIN — BUPRENORPHINE 4 MG: 2 TABLET SUBLINGUAL at 12:32

## 2021-03-26 RX ADMIN — ATENOLOL 25 MG: 25 TABLET ORAL at 09:10

## 2021-03-26 RX ADMIN — PRAZOSIN HYDROCHLORIDE 1 MG: 1 CAPSULE ORAL at 20:21

## 2021-03-26 RX ADMIN — OMEPRAZOLE 20 MG: 20 CAPSULE, DELAYED RELEASE ORAL at 08:58

## 2021-03-26 RX ADMIN — MELATONIN TAB 3 MG 3 MG: 3 TAB at 20:21

## 2021-03-26 RX ADMIN — LORAZEPAM 2 MG: 1 TABLET ORAL at 16:50

## 2021-03-26 RX ADMIN — FLUOXETINE 40 MG: 20 CAPSULE ORAL at 08:58

## 2021-03-26 RX ADMIN — CHLORZOXAZONE 500 MG: 500 TABLET ORAL at 00:06

## 2021-03-26 RX ADMIN — NICOTINE POLACRILEX 4 MG: 2 GUM, CHEWING BUCCAL at 16:50

## 2021-03-26 RX ADMIN — NICOTINE POLACRILEX 4 MG: 2 GUM, CHEWING BUCCAL at 20:24

## 2021-03-26 RX ADMIN — BUPRENORPHINE 2 MG: 2 TABLET SUBLINGUAL at 10:34

## 2021-03-26 RX ADMIN — MULTIPLE VITAMINS W/ MINERALS TAB 1 TABLET: TAB at 09:00

## 2021-03-26 RX ADMIN — Medication 2 MG: at 09:09

## 2021-03-26 ASSESSMENT — ACTIVITIES OF DAILY LIVING (ADL)
HYGIENE/GROOMING: INDEPENDENT
DRESS: INDEPENDENT
ORAL_HYGIENE: INDEPENDENT
LAUNDRY: UNABLE TO COMPLETE
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
## 2021-03-26 NOTE — PLAN OF CARE
Work Completed: The patient's care was discussed with the treatment team and chart notes were reviewed. Patient will discharge tomorrow. Writer contacted [facility] to inform of discharge. Patient requested to start PHP on Wednesday and that was approved by [facility]. AVS completed.     Discharge plan or goal: Home                Barriers to discharge: Patient will discharge tomorrow.

## 2021-03-26 NOTE — PROGRESS NOTES
Pt given 2 mg subutex test dose at 1034 when medication made available. Will assess pt at 1134 for adverse reactions.     1134: pt denies adverse reactions, pt denies itching    Will administer 4 mg subutex when available. Per Dr. Quseada give subutex 2 mg and subutex 4 mg one hour after if pt has no adverse reactions, Dr. Quesada aware pt will be getting a total of 6 mg of subutex this shift.

## 2021-03-26 NOTE — TELEPHONE ENCOUNTER
Patients appointment was changed to 3/30 at 9 am, he would like to do virtual if possible. He states that he does not like coming into clinics until he gets vaccinated.     Routing to Susana to determine if this can be changed to virtual or if she would like it to stay as an in person visit      Yasemin MILLAN    [facility]

## 2021-03-26 NOTE — PLAN OF CARE
"  Problem: Depressive Symptoms  Goal: Depressive Symptoms  Description: Signs and symptoms of listed problems will be absent or manageable.  Outcome: No Change     Problem: Suicidal Behavior  Goal: Suicidal Behavior is Absent or Managed  Outcome: Improving  Pt presented with flat affect, endorsed being frustrated, anxoius and depressed as a result of pain meds being cut down. Pt stated \"not being able have pain controled is like a death sentence\". Pt denies having any active SI, however says \"I can only tolerate the pain for so long\". Pt refused to try non pharmaceutical measures, states \"I have tried everything else and nothing works for the kind of pain I have\".  Pt equally refused scheduled Voltaren gel for pain relief, states \"it's  trash and does nothing\". Pt rated anxiety at 8/10, depression 10/10 (10 being the worst). PRNs offered, coping skill utilized watching TV, and talking. Pt states goal is to have pain controled, states his plan is to discharge and go find another pain clinic where he can be heard.     PRNs  Toradol 10 mg at 1800 and 2200  Lorazepam 2 mg at 1800 and 2200  Nicotine 4 mg x 5     "

## 2021-03-26 NOTE — TELEPHONE ENCOUNTER
Called pt and LM on pt's personal vm re: Susana Pike, WILBER's response. LM asking the patient to call the clinic to schedule the appointment that he feels more comfortable with.    LETHA Parker, RN-BC  Patient Care Supervisor  [facility] [place]

## 2021-03-26 NOTE — TELEPHONE ENCOUNTER
My preference would be for face to face in person, but I will not force him to to come to the clinic. We could do a virtual video visit if he so chooses. It is not possible for me to do any kind of physical exam, and that is part of the reason why I wanted to see him in person as he had stated in the hospital he was having worsened back pain with radiation into his right leg.     Ultimately, patient can choose face to face in person or video.     Thanks.  Susana GRANT, SANDRA CNP, FNP  [facility]  [facility]

## 2021-03-26 NOTE — PLAN OF CARE
Pt rates anxiety a 7/10 and depression 9/10 (10 being the worst), pt verbalized frustration with medication adjustments, pt feels he needs better pain management and starting suboxone will not benefit him. Pt declined non-pharmacological interventions for anxiety and pain offered. Later pt agreeable to trying lavender patch for anxiety. Pt denies SI/SIB, full range affect, mood is calm. Pt social with staff and peers.     PRNs this shift:  toradol per pt request for pain with no relief reported   Ativan per pt request for anxiety with some relief reported  nicotine gum per pt request.     Pt will be discharging tomorrow. Per Dr. Quesada have pt discharge no less than 24 hours after taking 1st dose of subutex 4mg. 1st dose given 3/26/21 at 1232. Pt given doctor note for school.

## 2021-03-26 NOTE — DISCHARGE INSTRUCTIONS
Behavioral Discharge Planning and Instructions    Summary: You were admitted on 3/20/2021  due to Suicidal Ideations.  You were treated by Dr. Saul Quesada and discharged on 3/27/2021 from station 3B to Home    Main Diagnosis:   Major depressive disorder, severe, recurrent, without psychosis  Generalized anxiety disorder  PTSD  Alcohol use disorder in sustained full remission    Health Care Follow-up:   Appointment: Pain Mgmt: Susanaleonardo Pike PAC : 3/30/21 at 9:00am (video appointment)  [facility]: [address] [place], [place] [id number] [place] [address]  Phone: [phone]     Appointment: Psychiatry: Dr. Slim Sage: 4/23/21 at 8:45am (video appt through RoomActually)  [facility]: [facility], [address]nd Floor [address] [address]  Phone: [phone]     Appointment: Therapy: Dr. Isak Cedillo 3/30/21 at 1:00pm (video appt through RoomActually)  [facility]: [facility], [address]nd Floor [address] [address] [phone]     Appointment: Partial Hospitalization Program: 3/31/21 at 9:00am (Staff member will call you in the morning)  [facility], Ground Floor [address]  [place] [address]      Attend all scheduled appointments with your outpatient providers. Call at least 24 hours in advance if you need to reschedule an appointment to ensure continued access to your outpatient providers.     Major Treatments, Procedures and Findings:  You were provided with: a psychiatric assessment, assessed for medical stability, medication evaluation and/or management, group therapy, milieu management and medical interventions    Symptoms to Report: feeling more aggressive, increased confusion, losing more sleep, mood getting worse or thoughts of suicide    Early warning signs can include: increased depression or anxiety sleep disturbances increased thoughts or behaviors of suicide or self-harm  increased unusual thinking, such as paranoia or hearing  voices    Safety and Wellness:  Take all medicines as directed.  Make no changes unless your doctor suggests them.  Follow treatment recommendations.  Refrain from alcohol and non-prescribed drugs.  If there is a concern for safety, call 911.    Resources:   Crisis Intervention: 653.332.3619 or 348-255-6098 (TTY: 669.176.1600).  Call anytime for help.  National [place] [employer] Mental Illness (www.mn.mario.org): 151.343.2070 or 370-039-2090.  Suicide Awareness Voices of Education (SAVE) (www.save.org): 790-122-PLYS (2866)  National Suicide Prevention Line (www.mentalhealthmn.org): 329-518-ZIFY (1139)  [facility] Crisis Response 806 725-2502    General Medication Instructions:   See your medication sheet(s) for instructions.   Take all medicines as directed.  Make no changes unless your doctor suggests them.   Go to all your doctor visits.  Be sure to have all your required lab tests. This way, your medicines can be refilled on time.  Do not use any drugs not prescribed by your doctor.  Avoid alcohol.    Advance Directives:   Scanned document on file with [email or web address]? No scanned doc  Is document scanned? Pt states no documents  Honoring Choices Your Rights Handout: Informed and given  Was more information offered? Pt declined    The Treatment team has appreciated the opportunity to work with you. If you have any questions or concerns about your recent admission, you can contact the unit which can receive your call 24 hours a day, 7 days a week. They will be able to get in touch with a Provider if needed. The unit number is [phone] .

## 2021-03-27 ENCOUNTER — TELEPHONE (OUTPATIENT)
Dept: PALLIATIVE MEDICINE | Facility: CLINIC | Age: 56
End: 2021-03-27

## 2021-03-27 VITALS
DIASTOLIC BLOOD PRESSURE: 81 MMHG | SYSTOLIC BLOOD PRESSURE: 119 MMHG | RESPIRATION RATE: 16 BRPM | OXYGEN SATURATION: 94 % | HEART RATE: 56 BPM | BODY MASS INDEX: 33.49 KG/M2 | TEMPERATURE: 97.9 F | HEIGHT: 76 IN | WEIGHT: 275 LBS

## 2021-03-27 DIAGNOSIS — M54.41 CHRONIC BILATERAL LOW BACK PAIN WITH RIGHT-SIDED SCIATICA: ICD-10-CM

## 2021-03-27 DIAGNOSIS — G89.29 CHRONIC BILATERAL LOW BACK PAIN WITH RIGHT-SIDED SCIATICA: ICD-10-CM

## 2021-03-27 DIAGNOSIS — M51.369 DDD (DEGENERATIVE DISC DISEASE), LUMBAR: ICD-10-CM

## 2021-03-27 DIAGNOSIS — M54.16 LUMBAR RADICULOPATHY: Primary | ICD-10-CM

## 2021-03-27 PROCEDURE — 250N000013 HC RX MED GY IP 250 OP 250 PS 637: Performed by: EMERGENCY MEDICINE

## 2021-03-27 PROCEDURE — 250N000013 HC RX MED GY IP 250 OP 250 PS 637: Performed by: PSYCHIATRY & NEUROLOGY

## 2021-03-27 PROCEDURE — 250N000013 HC RX MED GY IP 250 OP 250 PS 637: Performed by: NURSE PRACTITIONER

## 2021-03-27 PROCEDURE — 250N000013 HC RX MED GY IP 250 OP 250 PS 637: Performed by: PHYSICIAN ASSISTANT

## 2021-03-27 RX ADMIN — LORAZEPAM 2 MG: 1 TABLET ORAL at 01:28

## 2021-03-27 RX ADMIN — Medication 125 MCG: at 08:12

## 2021-03-27 RX ADMIN — NICOTINE POLACRILEX 4 MG: 2 GUM, CHEWING BUCCAL at 01:28

## 2021-03-27 RX ADMIN — Medication 1 G: at 08:12

## 2021-03-27 RX ADMIN — NICOTINE POLACRILEX 4 MG: 2 GUM, CHEWING BUCCAL at 09:51

## 2021-03-27 RX ADMIN — Medication 2 MG: at 08:12

## 2021-03-27 RX ADMIN — BUPRENORPHINE 4 MG: 2 TABLET SUBLINGUAL at 08:14

## 2021-03-27 RX ADMIN — FOLIC ACID 5 MG: 1 TABLET ORAL at 08:12

## 2021-03-27 RX ADMIN — KETOROLAC TROMETHAMINE 10 MG: 10 TABLET, FILM COATED ORAL at 01:28

## 2021-03-27 RX ADMIN — OMEPRAZOLE 20 MG: 20 CAPSULE, DELAYED RELEASE ORAL at 08:12

## 2021-03-27 RX ADMIN — MULTIPLE VITAMINS W/ MINERALS TAB 1 TABLET: TAB at 08:13

## 2021-03-27 RX ADMIN — KETOROLAC TROMETHAMINE 10 MG: 10 TABLET, FILM COATED ORAL at 08:14

## 2021-03-27 RX ADMIN — ATENOLOL 25 MG: 25 TABLET ORAL at 08:13

## 2021-03-27 RX ADMIN — NICOTINE POLACRILEX 4 MG: 2 GUM, CHEWING BUCCAL at 12:21

## 2021-03-27 RX ADMIN — LORAZEPAM 2 MG: 1 TABLET ORAL at 12:20

## 2021-03-27 RX ADMIN — KETOROLAC TROMETHAMINE 10 MG: 10 TABLET, FILM COATED ORAL at 12:20

## 2021-03-27 RX ADMIN — NICOTINE POLACRILEX 4 MG: 2 GUM, CHEWING BUCCAL at 06:40

## 2021-03-27 RX ADMIN — FLUOXETINE 40 MG: 20 CAPSULE ORAL at 08:13

## 2021-03-27 RX ADMIN — LIOTHYRONINE SODIUM 25 MCG: 25 TABLET ORAL at 08:12

## 2021-03-27 RX ADMIN — NICOTINE POLACRILEX 4 MG: 2 GUM, CHEWING BUCCAL at 08:13

## 2021-03-27 RX ADMIN — LORAZEPAM 2 MG: 1 TABLET ORAL at 08:14

## 2021-03-27 ASSESSMENT — ACTIVITIES OF DAILY LIVING (ADL)
HYGIENE/GROOMING: INDEPENDENT
ORAL_HYGIENE: INDEPENDENT
LAUNDRY: UNABLE TO COMPLETE
DRESS: INDEPENDENT

## 2021-03-27 NOTE — PLAN OF CARE
Pt plans to discharge at 1234, wife is able to pick pt up, wife will be driving. Pt requesting to discharge sooner, pt informed that per Dr. Quesada pt can discharge 24 hours after 1st dose of suboxon, pt agreeable to waiting until 1234.      PRNs this shift:  Toradol per pt request for pain with relief reported   Ativan per pt request for anxiety with relief reported  nicotine gum    Pt active in milieu, social with select staff and peers. Pt playing board game, laughing and joking with peers. Pt denies SI/SIB. Pt endorses anxiety, depression and chronic pain, pt verbalized he is hopefull the partial hospitalization will be effective. AVS reviewed with pt, questions answered, pt verbalized understanding. Pt left unit at 1240 for home with wife. Pt left with all belongings and medications. Pt has no security items.

## 2021-03-27 NOTE — PROGRESS NOTES
Patient woke up and complained of pain and anxiety. He rated his anxiety as high. Lorazepam 2 mgs given orally for anxiety and Toradol 10 mgs given for pain.     Patient slept for 5.5 hours only.

## 2021-03-27 NOTE — TELEPHONE ENCOUNTER
Weekend call received from call service. Spoke to patient via phone.  Les was discharged from hospital. He is now on Suboxone 8/2mg films using 0.5 film twice daily. He took his dose at 8 AM today. Currently rating his pain an 8/10. Pain present bilaterally low back and shoots down the right leg. Discussed that this is likely radicular pain. He has not had recent imaging. He may need updated imaging. No weakness, no b/b issues.   Told patient to take 2nd dose of Suboxone 4/0.5mg now. I will call him again about 8pm to see how he is doing.     Of note, review of his chart despite being on 0-52mg OME over the years, his pain has consistently been averaging 6-8/10 with a range of 5-10 regardless of any doses of opiates used at the time.     Susana GRANT, RN CNP, FNP  [facility]  [facility] [place]

## 2021-03-27 NOTE — PLAN OF CARE
Problem: Suicidal Behavior  Goal: Suicidal Behavior is Absent or Managed  Outcome: Improving  Earlier on to the shift pt was withdrawn, with encouragement, pt was able to interact with other peers and staff. Pt endorsed anxiety 9/10 and depression 8/10 (10 being the highest). Pt states major stressor is pain. States coping skills don't really work but socializing helped distract him for a little while. Pt states he is looking forward to discharging tomorrow.   Pt is eating and drinking adequately.  PRNS: Ativan x 1, Toradol x 1, Nicotin gum 4 mg x 3

## 2021-03-28 NOTE — TELEPHONE ENCOUNTER
Called Les back. He is still having pain, about the same. Using topicals. Reviewed chart and no recent imaging. I suspect the pain in the right leg is likely radicular in nature, though could be SI or piriformis as well..     Will have him obtain updated lumbar MRI and then he see me in person on Tuesday 3/30/2021 and I can also do a physical exam at that time. Patient understands the plan.    Susana GRANT, SANDRA CNP, FNP  [facility]  [facility]

## 2021-03-30 ENCOUNTER — TELEPHONE (OUTPATIENT)
Dept: BEHAVIORAL HEALTH | Facility: CLINIC | Age: 56
End: 2021-03-30

## 2021-03-30 NOTE — TELEPHONE ENCOUNTER
"RN Review of Medical History / Physical Health Screen  Outpatient Mental Health Programs - [facility] Adult Partial Hospitalization Program    PATIENT'S NAME: Jailene Breen  MRN:   [id number]  :   1965  ACCT. NUMBER: [id number]  CURRENT AGE:  55 year old    DATE OF DIAGNOSTIC ASSESSMENT: 3/30/21  DATE OF ADMISSION: 3/31/21     Please see Diagnostic Assessment for additional Medical History.     General Health:   Have you had any exposure to any communicable disease in the past 2-3 weeks? no     Are you aware of safe sex practices? yes       Nutrition:    Are you on a special diet? If yes, please explain:  no   Do you have any concerns regarding your nutritional status? If yes, please explain:  no   Have you had any appetite changes in the last 3 months?  No     Have you had any weight loss or weight gain in the last 3 months?  No     Do you have a history of an eating disorder? no   Do you have a history of being in an eating disorder program? no     Patient height and weight recorded by RN in epic flowsheet: no    No; Unable to measure  Because of temporary in-person programmatic suspension due to COVID-19 pandemic, all pt weights and heights will be collected through patient self-report an recorded in physical health screening progress note upon admission to the program.                            Height/Weight Review:  Patient reported height:     6'4\"   Patient reports weight:  Date last checked:  272lb   Any referrals/needs identified?     no     BMI Review:  Was the patient informed of BMI? no      Findings  No Intervention         Fall Risk:   Have you had any falls in the past 3 months? no     Do you currently use any assistive devices for mobility?   Yes, cane      Additional Comments/Assessment: denies outstanding medical concerns at this time    Per completion of the Medical History / Physical Health Screen, is there a recommendation to see / follow up with a primary care " physician/clinic or dentist?    No.      Gris Joshua RN  3/30/2021

## 2021-03-30 NOTE — PATIENT INSTRUCTIONS
MRI of the SI joint ordered to evaluate for sacroiliitis    Continue methotrexate 4 tablets once a week.  Methotrexate monitoring labs to be done ASAP.  Methotrexate labs to be done every 3 months    Continue Enbrel 50 mg subcu once a week    Follow-up in 2 months.  
06:31

## 2021-03-31 ENCOUNTER — TELEPHONE (OUTPATIENT)
Dept: BEHAVIORAL HEALTH | Facility: CLINIC | Age: 56
End: 2021-03-31

## 2021-04-01 ENCOUNTER — HOSPITAL ENCOUNTER (OUTPATIENT)
Dept: BEHAVIORAL HEALTH | Facility: CLINIC | Age: 56
End: 2021-04-01
Attending: PSYCHIATRY & NEUROLOGY
Payer: COMMERCIAL

## 2021-04-01 ENCOUNTER — HOSPITAL ENCOUNTER (OUTPATIENT)
Dept: BEHAVIORAL HEALTH | Facility: CLINIC | Age: 56
End: 2021-04-01
Attending: PSYCHIATRY & NEUROLOGY

## 2021-04-01 PROBLEM — F33.2 MAJOR DEPRESSIVE DISORDER, RECURRENT EPISODE, SEVERE WITH MIXED FEATURES (H): Status: ACTIVE | Noted: 2021-04-01

## 2021-04-01 PROCEDURE — H0035 MH PARTIAL HOSP TX UNDER 24H: HCPCS | Mod: GT | Performed by: COUNSELOR

## 2021-04-01 PROCEDURE — H0035 MH PARTIAL HOSP TX UNDER 24H: HCPCS | Mod: GT | Performed by: OCCUPATIONAL THERAPIST

## 2021-04-01 PROCEDURE — H0035 MH PARTIAL HOSP TX UNDER 24H: HCPCS | Mod: 95
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
## 2021-04-01 NOTE — GROUP NOTE
Psychotherapy Group Note    PATIENT'S NAME: Jailene Breen  MRN:   [id number]  :   1965  ACCT. NUMBER: [id number]  DATE OF SERVICE: 21  START TIME: 10:00 AM  END TIME: 10:50 AM  FACILITATOR: Vale Pepe LPCC; Yordy White LPCC  TOPIC: MH EBP Group: Coping Skills  [facility] Adult Partial Hospitalization Program  TRACK: [facility]    NUMBER OF PARTICIPANTS: 9    Summary of Group / Topics Discussed:  Coping Skills: Additional Coping Skills:  Patients discussed and practiced boundary setting, processing mental health with support system, ideas to cope with anger related to social justice issues.  Reviewed the benefits of applying the aforementioned coping strategies.  Patients explored how these strategies might be applied to daily stressors or distressing situations.    Patient Session Goals / Objectives:    Understand the purpose and benefits of applying boundaries and self-care coping strategies    Address barriers to utilizing coping skills when in distress.    Telemedicine Visit: The patient's condition can be safely assessed and treated via synchronous audio and visual telemedicine encounter.      Reason for Telemedicine Visit: Services only offered telehealth and due to COVID-19    Originating Site (Patient Location): Patient's home    Distant Site (Provider Location): Provider Remote Setting    Consent:  The patient/guardian has verbally consented to: the potential risks and benefits of telemedicine (video visit) versus in person care; bill my insurance or make self-payment for services provided; and responsibility for payment of non-covered services.     Mode of Communication:  Video Conference via ididwork    As the provider I attest to compliance with applicable laws and regulations related to telemedicine.        Patient Participation / Response:  Moderately participated, sharing some personal reflections / insights and adequately adequately received / provided feedback with  other participants.    Demonstrated understanding of topics discussed through group discussion and participation, Expressed understanding of the relevance / importance of coping skills at distressing times in life and Demonstrated knowledge of when to consider using a variety of coping skills in daily life    Treatment Plan:  Patient has an initial individualized treatment plan that was created as part of their diagnostic assessment / admission process.  A master individualized treatment plan is in the process of being developed with the patient and multi-disciplinary care team.    Vale Pepe, [facility]

## 2021-04-01 NOTE — GROUP NOTE
Psychotherapy Group Note    PATIENT'S NAME: Jailene Breen  MRN:   [id number]  :   1965  ACCT. NUMBER: [id number]  DATE OF SERVICE: 21  START TIME:  2:00 PM  END TIME:  2:50 PM  FACILITATOR: Vale Pepe LPCC; Yordy White LPCC  TOPIC:  EBP Group: Cognitive Restructuring  [facility] Adult Partial Hospitalization Program  TRACK: [facility]    NUMBER OF PARTICIPANTS: 9    Summary of Group / Topics Discussed:  Cognitive Restructuring: Distortions: Patients received an overview of how to identify common cognitive distortions. Patients will explore alternatives to cognitive distortions and practice challenging their negative thought patterns. The goal is to help patients target modify ineffective thought patterns.     Patient Session Goals / Objectives:    Familiarized self with ineffective / unhealthy thoughts and how they develop.      Explored impact of ineffective thoughts / distortions on mood and activity    Formulated new neutral/positive alternatives to challenge less helpful / ineffective thoughts.    Practiced and plan to apply in daily life    Telemedicine Visit: The patient's condition can be safely assessed and treated via synchronous audio and visual telemedicine encounter.      Reason for Telemedicine Visit: Services only offered telehealth and due to COVID-19    Originating Site (Patient Location): Patient's home    Distant Site (Provider Location): Provider Remote Setting    Consent:  The patient/guardian has verbally consented to: the potential risks and benefits of telemedicine (video visit) versus in person care; bill my insurance or make self-payment for services provided; and responsibility for payment of non-covered services.     Mode of Communication:  Video Conference via Zoom    As the provider I attest to compliance with applicable laws and regulations related to telemedicine.             Patient Participation / Response:  Fully participated with the group by sharing  personal reflections / insights and openly received / provided feedback with other participants.    Demonstrated understanding of topics discussed through group discussion and participation, Expressed understanding of the relationship between behaviors, thoughts, and feelings and Demonstrated knowledge of personal thought patterns and how they impact their mood and behavior.    Treatment Plan:  Patient has an initial individualized treatment plan that was created as part of their diagnostic assessment / admission process.  A master individualized treatment plan is in the process of being developed with the patient and multi-disciplinary care team.    Vale Pepe, [facility]

## 2021-04-01 NOTE — GROUP NOTE
Psychoeducation Group Note    PATIENT'S NAME: Jailene Breen  MRN:   [id number]  :   1965  [id number]  DATE OF SERVICE: 21  START TIME:  1:00 PM  END TIME:  1:50 PM  FACILITATOR: Lindsey Smith RN  TOPIC: ALTAF RN Group: Mind/Body Practice & Complementary  [facility] Adult Partial Hospitalization Program  TRACK: 1    NUMBER OF PARTICIPANTS: 9    Telemedicine Visit: The patient's condition can be safely assessed and treated via synchronous audio and visual telemedicine encounter.      Reason for Telemedicine Visit: Services only offered telehealth    Originating Site (Patient Location): Patient's home    Distant Site (Provider Location): Provider Remote Setting    Consent:  The patient/guardian has verbally consented to: the potential risks and benefits of telemedicine (video visit) versus in person care; bill my insurance or make self-payment for services provided; and responsibility for payment of non-covered services.     Mode of Communication:  Video Conference via Medical Zoom    As the provider I attest to compliance with applicable laws and regulations related to telemedicine.    Summary of Group / Topics Discussed:  Mind/Body Practice & Complementary Therapies:  Progressive Muscle Relaxation: In addition to affecting our mood and behavior, psychological stress can cause a myriad of physical symptoms in our body. Patients were educated on these effects and guided to increased self-awareness of how stress affects their body. The purpose, benefits, history, and techniques of progressive muscle relaxation were discussed. In an instructor guided experiential, patients were guided to practice PMR to help reduce physical symptoms of psychological stress and achieve a more balanced feeling of well-being.    Patient Session Goals / Objectives:  ? Identified physiological symptoms of stress on the body  ? Listed & Explained the purpose and benefits to practicing PMR   ? Practiced  progressive muscle relaxation experiential      Patient Participation / Response:  Fully participated with the group by sharing personal reflections / insights and openly received / provided feedback with other participants.    Demonstrated understanding of topics discussed through group discussion and participation, Identified / Expressed personal readiness to practice skills and Verbalized understanding of Mind/Body Practice & Complementary Therapies topic    Treatment Plan:  Patient has an initial individualized treatment plan that was created as part of their diagnostic assessment / admission process.  A master individualized treatment plan is in the process of being developed with the patient and multi-disciplinary care team.    Lindsey Smith RN

## 2021-04-02 ENCOUNTER — TELEPHONE (OUTPATIENT)
Dept: BEHAVIORAL HEALTH | Facility: CLINIC | Age: 56
End: 2021-04-02

## 2021-04-02 ENCOUNTER — HOSPITAL ENCOUNTER (OUTPATIENT)
Dept: BEHAVIORAL HEALTH | Facility: CLINIC | Age: 56
End: 2021-04-02
Attending: PSYCHIATRY & NEUROLOGY
Payer: COMMERCIAL

## 2021-04-02 PROCEDURE — H0035 MH PARTIAL HOSP TX UNDER 24H: HCPCS | Mod: GT | Performed by: SOCIAL WORKER

## 2021-04-02 PROCEDURE — H0035 MH PARTIAL HOSP TX UNDER 24H: HCPCS | Mod: 95 | Performed by: SOCIAL WORKER

## 2021-04-02 PROCEDURE — H0035 MH PARTIAL HOSP TX UNDER 24H: HCPCS | Mod: GT | Performed by: OCCUPATIONAL THERAPIST

## 2021-04-02 PROCEDURE — H0035 MH PARTIAL HOSP TX UNDER 24H: HCPCS | Mod: GT | Performed by: COUNSELOR

## 2021-04-02 PROCEDURE — H0035 MH PARTIAL HOSP TX UNDER 24H: HCPCS | Mod: 95 | Performed by: COUNSELOR

## 2021-04-02 NOTE — GROUP NOTE
Psychoeducation Group Note    PATIENT'S NAME: Jailene Breen  MRN:   [id number]  :   1965  ACCT. NUMBER: [id number]  DATE OF SERVICE: 21  START TIME: 11:00 AM  END TIME: 11:50 AM  FACILITATOR: Karen Mejias OTR/L  TOPIC: MH PHP OT Group: Self- Regulation Skills  [facility] Adult Partial Hospitalization Program  TRACK: 1    NUMBER OF PARTICIPANTS: 9    Telemedicine Visit: The patient's condition can be safely assessed and treated via synchronous audio and visual telemedicine encounter.      Reason for Telemedicine Visit: Services only offered telehealth    Originating Site (Patient Location): Patient's home    Distant Site (Provider Location): [facility] [place]    Consent:  The patient/guardian has verbally consented to: the potential risks and benefits of telemedicine (video visit) versus in person care; bill my insurance or make self-payment for services provided; and responsibility for payment of non-covered services.     Mode of Communication:  Video Conference via Zoom    As the provider I attest to compliance with applicable laws and regulations related to telemedicine.     Summary of Group / Topics Discussed:  Self-Regulation Skills: Coping Through the Senses Introduction  Part 1: Patients were introduced and taught about neurosensory based skills and strategies related to supporting effective self regulation skills.  Patients were taught about the external sensory systems (auditory, visual, smell, taste, and light touch) and how they can be used for coping with mental health symptoms and stressors.  Patients were provided with an opportunity to increase self-awareness of helpful sensory input and self care activities. Patients were introduced on how to create supportive environments that encourage use of these skills.    Patient Session Goals / Objectives:    Review/learn the external sensory systems and how they relate to  self-regulation    Identified specific and individualized neurosensory skills to help when distressed      Identified skills learned and how this applies to current daily life    Established a plan for practice of these skills in their own environments      Patient Participation / Response:  Fully participated with the group by sharing personal reflections / insights and openly received / provided feedback with other participants.    Patient presentation: constricted affect; active in group discussion sharing ideas/examples with the group, Verbalized understanding of content and Patient would benefit from additional opportunities to practice the content to be able to generalize it to their everyday life with increased intentionality, consistency, and efficacy in support of their psychiatric recovery     Les began the Partial Program today.  He was welcomed and oriened to Occupational Therapy services and groups.  Encouraged him to ask questions as needed.    Treatment Plan:  Patient has an initial individualized treatment plan that was created as part of their diagnostic assessment / admission process.  A master individualized treatment plan is in the process of being developed with the patient and multi-disciplinary care team.  Continue to assess.  Occupational Therapy evaluation will be completed with him in the next couple of program days.     Karen Mejias OTR/L

## 2021-04-02 NOTE — PROGRESS NOTES
[facility]   Dr. Adkins's Psychiatric Progress Note  2021      Patient:  Jailene Breen   Medical Record Number:  [id number]  :  1965    Telemedicine Visit: The patient's condition can be safely assessed and treated via synchronous audio and visual telemedicine encounter.       Reason for Telemedicine Visit: COVID-19 lockdown     Originating Site (Patient Location):  HOME     Distant Site (Provider Location): [facility]: [place]     Consent:  The patient/guardian has verbally consented to: the potential risks and benefits of telemedicine (video visit) versus in person care; bill my insurance or make self-payment for services provided; and responsibility for payment of non-covered services.        Interim History:   The patient's care was discussed with the treatment team and chart notes were reviewed.    21:  Pt sees rheum this morning.  Appt was ok.  Needs to find another rheum doc as she's leaving.  Pt has low Vit D.  He'll start high dose Vit D.  Dealing with financial issues.  Weekend plans: get outside and shoot photos.      Psychiatric ROS:  Mood:  a little less down;  Still somewhat anxious  Sleep:  Poor, only got 2 hours last night;  Mind won't shut off;  Wakes in pain;    Appetite:normal  Eating:normal  Energy Level:  Better;  Doesn't lay around all day  Concentration/Memory Problems:  NO  Suicidal Thoughts:No  Homicidal Thoughts:No  Psychotic Symptoms: No  Medication Side Effects:No  Medication Compliance:Yes   Physical Complaints:positive for pain all over; worst in hip, lower back and knees         Medications:     PAST PSYCH MEDS:  Prozac, Lexapro, nortriptyline, Remeron, Abilify, trazodone, melatonin, Klonopin    Current Outpatient Medications   Medication Sig     acetaminophen (TYLENOL) 500 MG tablet Take 1,000 mg by mouth every 6 hours as needed for mild pain (headache)     ARIPiprazole (ABILIFY) 2 MG tablet Take 1 tablet (2 mg) by  mouth daily     atenolol (TENORMIN) 25 MG tablet Take 1 tablet (25 mg) by mouth daily     buprenorphine HCl-naloxone HCl (SUBOXONE) 4-1 MG per film Place 1 Film under the tongue daily     chlorzoxazone (PARAFON FORTE) 500 MG tablet Take 1 tablet (500 mg) by mouth 3 times daily as needed for muscle spasms     cholecalciferol (VITAMIN D3) 125 mcg (5000 units) capsule Take 125 mcg by mouth daily     clonazePAM (KLONOPIN) 1 MG tablet TAKE ONE-HALF - 1 TABLET BY MOUTH ONCE DAILY AS NEEDED FOR ANXIETY     diclofenac (VOLTAREN) 1 % topical gel Apply 2 g topically 4 times daily     etanercept (ENBREL SURECLICK) 50 MG/ML autoinjector Inject 50 mg Subcutaneous once a week (Patient taking differently: Inject 50 mg Subcutaneous once a week Every Tuesday)     fish oil-omega-3 fatty acids 1000 MG capsule Take 1 g by mouth daily     FLUoxetine (PROZAC) 40 MG capsule Take 1 capsule (40 mg) by mouth daily     folic acid (FOLVITE) 1 MG tablet Take 5 tablets (5 mg) by mouth daily     Lidocaine (LIDOCARE) 4 % Patch Place 1-3 patches onto the skin every 24 hours To prevent lidocaine toxicity, patient should be patch free for 12 hrs daily.     lidocaine (XYLOCAINE) 5 % external ointment Apply topically every 4 hours as needed for moderate pain     liothyronine (CYTOMEL) 25 MCG tablet Take 1 tablet (25 mcg) by mouth daily     melatonin 3 MG tablet Take 1 tablet (3 mg) by mouth At Bedtime     menthol, Topical Analgesic, 2.5% (BENGAY VANISHIN SCENT) 2.5 % GEL topical gel Apply topically every 6 hours as needed for moderate pain     methotrexate sodium 2.5 MG TABS Take 4 tablets (10 mg) by mouth every 7 days (Patient taking differently: Take 4 tablets by mouth every 7 days Every tuesday)     multivitamin (CENTRUM SILVER) tablet Take 1 tablet by mouth daily     nicotine polacrilex (NICORETTE) 4 MG gum PLACE 1 PIECE INSIDE THE CHEEK AS NEEDED FOR SMOKING CESSATION     omeprazole (PRILOSEC) 20 MG DR capsule Take 20 mg by mouth daily       "prazosin (MINIPRESS) 1 MG capsule Take 1 capsule (1 mg) by mouth At Bedtime     traZODone (DESYREL) 50 MG tablet Take 0.5 tablets (25 mg) by mouth At Bedtime . With additional 0.25 tablet (12.5mg) as needed daily for anxiety. (Patient taking differently: Take 50 mg by mouth At Bedtime )     No current facility-administered medications for this encounter.              Allergies:     Allergies   Allergen Reactions     Mirtazapine      Other reaction(s): Coma     Hydrocodone Itching     Ms Contin [Morphine] Itching     Remeron Soltab Other (See Comments)     \"Blacked out\" for one week            Psychiatric Examination:   There were no vitals taken for this visit.  Weight is 0 lbs 0 oz  There is no height or weight on file to calculate BMI.    Appearance:  awake, alert and adequately groomed  Attitude:  cooperative  Eye Contact:  good  Mood:   less down and less anxious  Affect:  mood congruent  Speech:  clear, coherent  Psychomotor Behavior:  no evidence of tardive dyskinesia, dystonia, or tics  Throught Process:  logical  Associations:  no loose associations  Thought Content:  no evidence of suicidal ideation or homicidal ideation and no evidence of psychotic thought  Insight:  good  Judgement:  intact  Oriented to:  time, person, and place  Attention Span and Concentration:  intact  Recent and Remote Memory:  intact  Gait:Normal    Risk/Potential for Dangerousness:  Multiple Active Diagnoses:HIGH  Self Care:HIGH  Suicide:LOW  Assault:LOW  Self Injurious Behaviors:LOW  Inappropriate Sexual Behavior:LOW         Labs:   No results found for this or any previous visit (from the past 24 hour(s)).     Recent Results (from the past 1008 hour(s))   Asymptomatic SARS-CoV-2 COVID-19 Virus (Coronavirus) by PCR    Collection Time: 03/16/21 12:47 PM    Specimen: Nasopharyngeal   Result Value Ref Range    SARS-CoV-2 Virus Specimen Source Nasopharyngeal     SARS-CoV-2 PCR Result NEGATIVE     SARS-CoV-2 PCR Comment (Note)    Drug " abuse screen 77 urine ([place], [place], SH)    Collection Time: 03/16/21  1:23 PM   Result Value Ref Range    Amphetamine Qual Urine Negative NEG^Negative    Barbiturates Qual Urine Negative NEG^Negative    Benzodiazepine Qual Urine Negative NEG^Negative    Cannabinoids Qual Urine Negative NEG^Negative    Cocaine Qual Urine Negative NEG^Negative    Opiates Qualitative Urine Negative NEG^Negative    PCP Qual Urine Negative NEG^Negative   CBC with platelets differential    Collection Time: 03/17/21  7:48 AM   Result Value Ref Range    WBC 8.3 4.0 - 11.0 10e9/L    RBC Count 4.95 4.4 - 5.9 10e12/L    Hemoglobin 15.0 13.3 - 17.7 g/dL    Hematocrit 44.1 40.0 - 53.0 %    MCV 89 78 - 100 fl    MCH 30.3 26.5 - 33.0 pg    MCHC 34.0 31.5 - 36.5 g/dL    RDW 13.3 10.0 - 15.0 %    Platelet Count 211 150 - 450 10e9/L    Diff Method Automated Method     % Neutrophils 53.5 %    % Lymphocytes 33.0 %    % Monocytes 8.0 %    % Eosinophils 4.3 %    % Basophils 1.0 %    % Immature Granulocytes 0.2 %    Nucleated RBCs 0 0 /100    Absolute Neutrophil 4.4 1.6 - 8.3 10e9/L    Absolute Lymphocytes 2.7 0.8 - 5.3 10e9/L    Absolute Monocytes 0.7 0.0 - 1.3 10e9/L    Absolute Eosinophils 0.4 0.0 - 0.7 10e9/L    Absolute Basophils 0.1 0.0 - 0.2 10e9/L    Abs Immature Granulocytes 0.0 0 - 0.4 10e9/L    Absolute Nucleated RBC 0.0    Comprehensive metabolic panel    Collection Time: 03/17/21  7:48 AM   Result Value Ref Range    Sodium 142 133 - 144 mmol/L    Potassium 4.1 3.4 - 5.3 mmol/L    Chloride 110 (H) 94 - 109 mmol/L    Carbon Dioxide 27 20 - 32 mmol/L    Anion Gap 5 3 - 14 mmol/L    Glucose 92 70 - 99 mg/dL    Urea Nitrogen 12 7 - 30 mg/dL    Creatinine 1.12 0.66 - 1.25 mg/dL    GFR Estimate 74 >60 mL/min/[1.73_m2]    GFR Estimate If Black 86 >60 mL/min/[1.73_m2]    Calcium 8.4 (L) 8.5 - 10.1 mg/dL    Bilirubin Total 0.6 0.2 - 1.3 mg/dL    Albumin 3.3 (L) 3.4 - 5.0 g/dL    Protein Total 6.7 (L) 6.8 - 8.8 g/dL    Alkaline Phosphatase 91 40 - 150 U/L     ALT 30 0 - 70 U/L    AST 17 0 - 45 U/L   TSH with free T4 reflex and/or T3 as indicated    Collection Time: 03/17/21  7:48 AM   Result Value Ref Range    TSH 2.01 0.40 - 4.00 mU/L   Folate    Collection Time: 03/17/21  7:48 AM   Result Value Ref Range    Folate 17.2 >5.4 ng/mL   Vitamin B12    Collection Time: 03/17/21  7:48 AM   Result Value Ref Range    Vitamin B12 466 193 - 986 pg/mL   Drug abuse screen 6 urine (tox)    Collection Time: 03/21/21  2:05 AM   Result Value Ref Range    Amphetamine Qual Urine Negative NEG^Negative    Barbiturates Qual Urine Negative NEG^Negative    Benzodiazepine Qual Urine Negative NEG^Negative    Cannabinoids Qual Urine Negative NEG^Negative    Cocaine Qual Urine Negative NEG^Negative    Ethanol Qual Urine Negative NEG^Negative    Opiates Qualitative Urine Negative NEG^Negative   Asymptomatic Influenza A/B & SARS-CoV2 (COVID-19) Virus PCR Multiplex    Collection Time: 03/21/21  6:34 AM    Specimen: Nasopharyngeal   Result Value Ref Range    Flu A/B & SARS-COV-2 PCR Source Nasopharyngeal     SARS-CoV-2 PCR Result NEGATIVE     Influenza A PCR Negative NEG^Negative    Influenza B PCR Negative NEG^Negative    Respiratory Syncytial Virus PCR (Note)     Flu A/B & SARS-CoV-2 PCR Comment (Note)    CBC with platelets differential    Collection Time: 03/21/21  8:08 AM   Result Value Ref Range    WBC 6.8 4.0 - 11.0 10e9/L    RBC Count 5.18 4.4 - 5.9 10e12/L    Hemoglobin 15.6 13.3 - 17.7 g/dL    Hematocrit 45.7 40.0 - 53.0 %    MCV 88 78 - 100 fl    MCH 30.1 26.5 - 33.0 pg    MCHC 34.1 31.5 - 36.5 g/dL    RDW 13.1 10.0 - 15.0 %    Platelet Count 246 150 - 450 10e9/L    Diff Method Automated Method     % Neutrophils 53.7 %    % Lymphocytes 31.9 %    % Monocytes 8.9 %    % Eosinophils 4.2 %    % Basophils 1.0 %    % Immature Granulocytes 0.3 %    Nucleated RBCs 0 0 /100    Absolute Neutrophil 3.7 1.6 - 8.3 10e9/L    Absolute Lymphocytes 2.2 0.8 - 5.3 10e9/L    Absolute Monocytes 0.6 0.0 - 1.3  10e9/L    Absolute Eosinophils 0.3 0.0 - 0.7 10e9/L    Absolute Basophils 0.1 0.0 - 0.2 10e9/L    Abs Immature Granulocytes 0.0 0 - 0.4 10e9/L    Absolute Nucleated RBC 0.0    Comprehensive metabolic panel    Collection Time: 03/21/21  8:08 AM   Result Value Ref Range    Sodium 141 133 - 144 mmol/L    Potassium 4.0 3.4 - 5.3 mmol/L    Chloride 108 94 - 109 mmol/L    Carbon Dioxide 29 20 - 32 mmol/L    Anion Gap 4 3 - 14 mmol/L    Glucose 95 70 - 99 mg/dL    Urea Nitrogen 12 7 - 30 mg/dL    Creatinine 0.92 0.66 - 1.25 mg/dL    GFR Estimate >90 >60 mL/min/[1.73_m2]    GFR Estimate If Black >90 >60 mL/min/[1.73_m2]    Calcium 8.3 (L) 8.5 - 10.1 mg/dL    Bilirubin Total 0.7 0.2 - 1.3 mg/dL    Albumin 3.5 3.4 - 5.0 g/dL    Protein Total 7.2 6.8 - 8.8 g/dL    Alkaline Phosphatase 100 40 - 150 U/L    ALT 34 0 - 70 U/L    AST 20 0 - 45 U/L   TSH with free T4 reflex    Collection Time: 03/21/21  8:08 AM   Result Value Ref Range    TSH 2.39 0.40 - 4.00 mU/L         Impression:   This is a 55 year old male continues PHP for mood stabilization.  Mood is doing a little better.  He feels the meds are starting to kick in.  Groups are going well.          DIagnoses:     Axis I:  Major depressive disorder, recurrent.  Generalized anxiety disorder.  Panic disorder.  Alcohol use disorder, in remission.   Axis II:  Deferred.   Axis III:  Chronic pain.          Plan:     Continue [facility] Program.   Continue Abilify and Prozac, which were recently started during his partial stays.   Expect stabilization and completion of Partial Hospital Program.   We will reevaluate psychotropic medications during his partial hospital stay.   Follow-up with Dr. Sage and therapist at the [facility].     Jw Adkins MD

## 2021-04-04 ENCOUNTER — MYC MEDICAL ADVICE (OUTPATIENT)
Dept: PALLIATIVE MEDICINE | Facility: CLINIC | Age: 56
End: 2021-04-04

## 2021-04-05 ENCOUNTER — HOSPITAL ENCOUNTER (OUTPATIENT)
Dept: BEHAVIORAL HEALTH | Facility: CLINIC | Age: 56
End: 2021-04-05
Attending: PSYCHIATRY & NEUROLOGY
Payer: COMMERCIAL

## 2021-04-05 ENCOUNTER — MYC MEDICAL ADVICE (OUTPATIENT)
Dept: PSYCHIATRY | Facility: CLINIC | Age: 56
End: 2021-04-05

## 2021-04-05 ENCOUNTER — TELEPHONE (OUTPATIENT)
Dept: PSYCHIATRY | Facility: CLINIC | Age: 56
End: 2021-04-05

## 2021-04-05 PROCEDURE — H0035 MH PARTIAL HOSP TX UNDER 24H: HCPCS | Mod: GT | Performed by: COUNSELOR

## 2021-04-05 PROCEDURE — H0035 MH PARTIAL HOSP TX UNDER 24H: HCPCS | Mod: GT

## 2021-04-05 PROCEDURE — H0035 MH PARTIAL HOSP TX UNDER 24H: HCPCS | Mod: GT | Performed by: OCCUPATIONAL THERAPIST

## 2021-04-05 NOTE — GROUP NOTE
Psychoeducation Group Note    PATIENT'S NAME: Jailene Breen  MRN:   [id number]  :   1965  ACCT. NUMBER: [id number]  DATE OF SERVICE: 21  START TIME:  1:00 PM  END TIME:  1:50 PM  FACILITATOR: Karen Mejias OTR/L  TOPIC:  PHP OT Group: Lifestyle Balance and Structure  [facility] Adult Partial Hospitalization Program  TRACK: 1    NUMBER OF PARTICIPANTS: 9    Telemedicine Visit: The patient's condition can be safely assessed and treated via synchronous audio and visual telemedicine encounter.      Reason for Telemedicine Visit: Services only offered telehealth    Originating Site (Patient Location): Patient's home    Distant Site (Provider Location): [facility] Outpatient Setting: [facility]    Consent:  The patient/guardian has verbally consented to: the potential risks and benefits of telemedicine (video visit) versus in person care; bill my insurance or make self-payment for services provided; and responsibility for payment of non-covered services.     Mode of Communication:  Video Conference via Zoom    As the provider I attest to compliance with applicable laws and regulations related to telemedicine.     Summary of Group / Topics Discussed:  Transitions/Aftercare Planning:   Provided psychoeducation, validation and support on transitions and coping with change.  Assisted patients in beginning to look at resources and needs once they have completed the Partial Program for ongoing care and support.  Facilitated discussion on practical application of skill usage in various community activities and problem solved barriers.           Patient Session Goals / Objectives:    To identify current transitions in their lives that are currently experiencing    To assist with a smooth transition to the next level of care with needed resources and support    To assist with the application of skills taught in the program to everyday life, planning and problem solving as needed        Patient Participation / Response:  Moderately participated, sharing some personal reflections / insights and adequately adequately received / provided feedback with other participants.    Patient presentation: eyes closed but reported he was listening; struggling with poor sleep from the night before; asked some relevant questions and Patient would benefit from additional opportunities to practice the content to be able to generalize it to their everyday life with increased intentionality, consistency, and efficacy in support of their psychiatric recovery    Treatment Plan:  Patient has an initial individualized treatment plan that was created as part of their diagnostic assessment / admission process.  A master individualized treatment plan is in the process of being developed with the patient and multi-disciplinary care team.    Karen Mejias, OTR/L

## 2021-04-05 NOTE — ADDENDUM NOTE
Encounter addended by: Betzaida Bell, LICSW on: 4/5/2021 3:23 PM   Actions taken: Charge Capture section accepted

## 2021-04-05 NOTE — TELEPHONE ENCOUNTER
"Called pt after he had made suicidal statements \"The only way I can think to end the pain is to end my life.\"     He stated that he has been frustrated that his pain has not been managed since switching to Suboxone.     Asked him if he had spoken with his pain mgmt provider about the plan after starting Suboxone. He stated he didn't know if there was any plan to increase his dose further. Suggested he get in contact with them to see if there is a plan to adjust dose or if plan was to eventually taper off.    Otherwise, he stated that he has been feeling safe. No current thoughts of SI. He has never had the intention to overdose with medications. Has had a plan in the past to run his car into a tree. If those thoughts return, he said he could let his wife know. She is at home with him currently. No other lethal means for self-harm reported.     He requested this writer to notify his pain mgmt prescriber about Les's questions and concerns.    Plan  - No changes to psychiatric medications  - Will message Suboxone prescriber regarding pt concerns.  - Discussed safety plan for SI.    Shmuel Lynn MD  PGY-3 Psychiatry Resident  "

## 2021-04-05 NOTE — TELEPHONE ENCOUNTER
Per patient myChart message:  From: Jamison Breen      Created: 4/5/2021 9:12 AM      There are no new areas of pain,  the same ones I've had for years . I was left a message last week that she would do a virtual for me.      ____________    Mychart sent to pt:  From: Cha Molina RN      Created: 4/5/2021 9:36 AM      I spoke w/ her about this and she specifically wanted an in-person only.  Not a video. Are you able to make it in-person tomorrow at a different time?     SANDRA Eubanks-BSN  [facility]

## 2021-04-05 NOTE — GROUP NOTE
Psychotherapy Group Note    PATIENT'S NAME: Jailene Breen  MRN:   [id number]  :   1965  ACCT. NUMBER: [id number]  DATE OF SERVICE: 21  START TIME:  2:00 PM  END TIME:  2:50 PM  FACILITATOR: Vale Pepe LPCC; Yordy White LPCC  TOPIC:  EBP Group: Self-Awareness  [facility] Adult Partial Hospitalization Program  TRACK: [facility]    NUMBER OF PARTICIPANTS: 9    Summary of Group / Topics Discussed:  Self-Awareness: Values: Patients identified personal values by examining development of their current values and how their values influence their daily functioning and life choices. Patients explored the impact of their values on their thoughts, feelings, and actions. Patients discussed definition of personal values and how they develop and change over time. The goal is to help patients reconcile value conflicts and achieve balance and flexibility to improve mood and daily functioning.     Patient Session Goals / Objectives:    Examined development of values and impact of values on functioning    Identified and prioritized important values related to current well-being     Identified strategies to change or enhance values to positively impact symptoms    Assisted patients to find ways to adapt functioning to better fit their values    Telemedicine Visit: The patient's condition can be safely assessed and treated via synchronous audio and visual telemedicine encounter.      Reason for Telemedicine Visit: Services only offered telehealth and due to COVID-19    Originating Site (Patient Location): Patient's home    Distant Site (Provider Location): Provider Remote Setting    Consent:  The patient/guardian has verbally consented to: the potential risks and benefits of telemedicine (video visit) versus in person care; bill my insurance or make self-payment for services provided; and responsibility for payment of non-covered services.     Mode of Communication:  Video Conference via Zoom    As the  provider I attest to compliance with applicable laws and regulations related to telemedicine.          Patient Participation / Response:  Fully participated with the group by sharing personal reflections / insights and openly received / provided feedback with other participants.    Demonstrated understanding of topics discussed through group discussion and participation, Demonstrated understanding of values, strengths, and challenges to learn about themselves and Identified / Expressed readiness to act intentionally, increase self-compassion, promote personal growth    Treatment Plan:  Patient has an initial individualized treatment plan that was created as part of their diagnostic assessment / admission process.  A master individualized treatment plan is in the process of being developed with the patient and multi-disciplinary care team.    Vale Pepe, [facility]

## 2021-04-05 NOTE — TELEPHONE ENCOUNTER
Per patient [place] message:  From: Jamison Breen      Created: 4/5/2021 11:01 AM      Weird, why did she have a nurse call me last week with the option of a video.  I will do my best to make it although a video would be best while I'm in the partial hospitalization program        Routed to provider.    Cha RN-BSN  [facility]
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
## 2021-04-05 NOTE — GROUP NOTE
Process Group Note    PATIENT'S NAME: Jailene Breen  MRN:   [id number]  :   1965  ACCT. NUMBER: [id number]  DATE OF SERVICE: 21  START TIME: 10:00 AM  END TIME: 10:50 AM  FACILITATOR: Yordy White [facility]; [facility]  TOPIC:  Process Group    Diagnoses:  Axis I:  Major depressive disorder, recurrent.  Generalized anxiety disorder.  Panic disorder.  Alcohol use disorder, in remission.   Axis II:  Deferred.   Axis III:  Chronic pain.     [facility] Adult Partial Hospitalization Program  TRACK: [facility]    NUMBER OF PARTICIPANTS: 9  Telemedicine Visit: The patient's condition can be safely assessed and treated via synchronous audio and visual telemedicine encounter.      Reason for Telemedicine Visit: Services only offered telehealth and due to COVID-19.    Originating Site (Patient Location): Patient's home    Distant Site (Provider Location): Provider Remote Setting    Consent:  The patient/guardian has verbally consented to: the potential risks and benefits of telemedicine (video visit) versus in person care; bill my insurance or make self-payment for services provided; and responsibility for payment of non-covered services.     Mode of Communication:  Video Conference via [employer] (formerly [employer])    As the provider I attest to compliance with applicable laws and regulations related to telemedicine.            Data:    Session content: At the start of this group, patients were invited to check in by identifying themselves, describing their current emotional status, and identifying issues to address in this group.   Area(s) of treatment focus addressed in this session included Symptom Management, Personal Safety and Community Resources/Discharge Planning.  Patient reported feeling frustrated and sad. Patient discussed working toward getting back into a better sleep pattern. Patient identified staying awake and going for a walk as skills they will use to address their goal(s). Patient reported  frustration toward his doctor might turn to anger because his doctor stopped his pain medication due to safety concerns, but now he is in a lot of pain may be a barrier to working toward their goal(s) and/or addressing mental health symptoms. Patient reported no safety concerns and/or self-injurious behaviors. Patient reported no substance use. Patient reported they are taking their medications as prescribed. Patient reported feeling proud that they showed up for group. Patient discussed challenges and frustrations because his doctor stopped his pain medication due to safety concerns, with the treatment group, and Jamison believes he would not be at a safety risk from his pain medications.    Therapeutic Interventions/Treatment Strategies:  Psychotherapist offered support, feedback and validation and reinforced use of skills. Treatment modalities used include Motivational Interviewing and Cognitive Behavioral Therapy. Interventions include Coping Skills: Assisted patient in identifying 1-2 healthy distraction skills to reduce overall distress, Symptoms Management: Promoted understanding of their diagnoses and how it impacts their functioning and Emotions Management:  Discussed barriers to emotional regulation.    Assessment:    Patient response:   Patient responded to session by accepting feedback, giving feedback, listening, focusing on goals, being attentive and accepting support    Possible barriers to participation / learning include: Jamison reports he is tired and in pain    Health Issues:   Yes: Jamison reports he is having increased pain problems due to his doctor stopping his pain medication       Substance Use Review:   Substance Use: No active concerns identified.    Mental Status/Behavioral Observations  Appearance:   Appropriate   Eye Contact:   Good   Psychomotor Behavior: Normal   Attitude:   Cooperative   Orientation:   All  Speech   Rate / Production: Normal    Volume:  Normal   Mood:    Angry  Anxious   Depressed  Irritable  Normal Sad   Affect:    Appropriate  Subdued   Thought Content:   Clear and Safety denies any current safety concerns including suicidal ideation, self-harm, and homicidal ideation  Thought Form:  Coherent  Logical     Insight:    Good     Plan:     Safety Plan: No current safety concerns identified.  Recommended that patient call 911 or go to the local ED should there be a change in any of these risk factors.     Barriers to treatment: None identified    Patient Contracts (see media tab):  None    Substance Use: Provided encouragement towards sobriety     Continue or Discharge: Patient will continue in Adult Partial Hospitalization Program (PHP)  as planned. Patient is likely to benefit from learning and using skills as they work toward the goals identified in their treatment plan.      Yordy White, [facility]  April 5, 2021

## 2021-04-05 NOTE — GROUP NOTE
Psychoeducation Group Note    PATIENT'S NAME: Jailene Breen  MRN:   [id number]  :   1965  ACCT. NUMBER: [id number]  DATE OF SERVICE: 21  START TIME: 11:00 AM  END TIME: 11:50 AM  FACILITATOR: Kannan Collier OTR/L  TOPIC: [facility] OT Group: Lifestyle Balance and Structure  [facility] Adult Partial Hospitalization Program  TRACK: 1    NUMBER OF PARTICIPANTS: 9    Telemedicine Visit: The patient's condition can be safely assessed and treated via synchronous audio and visual telemedicine encounter.      Reason for Telemedicine Visit: Services only offered telehealth    Originating Site (Patient Location): Patient's home    Distant Site (Provider Location): [facility]: [place]    Consent:  The patient/guardian has verbally consented to: the potential risks and benefits of telemedicine (video visit) versus in person care; bill my insurance or make self-payment for services provided; and responsibility for payment of non-covered services.     Mode of Communication:  Video Conference via [employer]    As the provider I attest to compliance with applicable laws and regulations related to telemedicine.      Summary of Group / Topics Discussed:  Lifestyle Balance and Structure:  OT Goal-setting & integration: To support progress towards treatment goals and psychiatric recovery, group members were led through a weekly structured process to reflect on progress, integrate new learning/skills, and emerging self-awareness into their daily and weekly life. Provided psychoeducation on the neuroscience of change, self-compassion, and the anatomy of a SMART goal to the group. Facilitated the sharing of individual goals with validation, support, and feedback provided.    Patient Session Goals / Objectives:    Reflected on and create a vision for recovery and wellbeing    Identified and wrote 3 SMART goals for the week    Identified and problem solved barriers to achieving identified goals      Identified plan to support follow through on goals and reflection on progress made        Patient Participation / Response:  Fully participated with the group by sharing personal reflections / insights and openly received / provided feedback with other participants.    Patient presentation: some tech issues so in and out of group, did not share any positive reflection yet did note goal for week to try to take a short work, Verbalized understanding of content and Patient would benefit from additional opportunities to practice the content to be able to generalize it to their everyday life with increased intentionality, consistency, and efficacy in support of their psychiatric recovery    Treatment Plan:  Patient has a current master individualized treatment plan.  See Epic treatment plan for more information.    Kannan Collier, OTR/L

## 2021-04-05 NOTE — PROGRESS NOTES
"Outpatient Mental Health Services - Adult     MY COPING PLAN FOR SAFETY     PATIENT'S NAME:    Jailene Breen  MRN:                           [id number]     SAFETY PLAN:     Step 1: Warning signs / cues (Thoughts, images, mood, situation, behavior) that a crisis may be developing:     ? Thoughts: nothing makes it better, wishing to crash car, feeling worthless as not able to help finanically.  ? Images: of crashing car.  ? Thinking Processes: ruminations (can't stop thinking about my problems): feeling worhtless and hopeless about self, intrusive thoughts (bothersome, unwanted thoughts that come out of nowhere): feels worthless and then thoughts that family would be better off without him. and highly critical and negative thoughts: feels worthless and unable to help family.  ? Mood: worsening depression, hopelessness, helplessness and intense worry  ? Behaviors: isolating/withdrawing , not taking care of myself, not taking care of my responsibilities and not sleeping enough  ? Situations: trauma  and financial stress   ?    Step 2: Coping strategies - Things I can do to take my mind off of my problems without contacting another person (relaxation technique, physical activity):     ? Distress Tolerance Strategies:  lately has not been trying much laying around a lot.  ? Physical Activities: none due to pain.  ? Focus on helpful thoughts:  it has been a while but sometimes able to encourage self.     Step 3: People and social settings that provide distraction:                 Name:  [facility] [employer] [facility].  [facility]      Step 4: Remind myself of people and things that are important to me and worth living for: \" my wife and kids and dogs\".     Step 5: When I am in crisis, I can ask these people to help me use my safety plan:                 Name: Gris Breen              Phone: [phone]        Step 6: Making the environment safe:      ? be " around others     Step 7: Professionals or agencies I can contact during a crisis:     ? Suicide Prevention Lifeline: 3-696-587-TALK (4427)  ? Crisis Text Line Service (available 24 hours a day, 7 days a week): Text [place] to 642206  ? Call  **CRISIS (780341) from a cell phone to talk to a team of professionals who can help you.          Crisis Services By [facility]: Phone Number:   [place]     [phone]   [place]    [phone]   [place]    [phone]   [place]    [phone]   [place]    [phone]   [place] [phone]   [place]     [phone]      ? Call 911 or go to my nearest emergency department.             I helped develop this safety plan and agree to use it when needed.  I have been given a copy of this plan.          Today s date:  3/18/2021  Adapted from Safety Plan Template 2008 Latanya Hooper and Isac Talbert is reprinted with the express permission of the authors.  No portion of the Safety Plan Template may be reproduced without the express, written permission.  You can contact the authors at [email or web address] or [email or web address]

## 2021-04-05 NOTE — TELEPHONE ENCOUNTER
Discussed with Susana Pike CNP via phone call. As she had documented before, an in person visit will be the best way to care care of the patient at this time.     Message sent to pt:    Lopez Swift,     I can clarify that the reason a video visit was mentioned was because we are trying to be accommodating to patients when circumstances prevent them from getting to the clinic. But, in your case, Susana highly recommends an in person visit at this time to adequately assess and discuss your current situation, especially since you reported to other providers that suboxone has not been beneficial for your pain management.     If your partial hospitalization program is from 9 to 3 pm as listed in your appointment ledger, then the 4 pm appointment would likely work. If your program is from 1 - 5 pm as Susana said that you mentioned, changing the appointment to tomorrow morning (she has an hour available at 8:00 am and 11 am) would work.     We want to be able to help with determining the next steps for your pain management and an in person appointment would be the best way to do that right now. If that is absolutely not a possibility, then a video visit would provide a good touch point but Susana will not be able to fully assess all aspects for all possible recommendations.     Hopefully we will see you tomorrow!     Thank you,     KERLINE ParkerN, RN-BC  Patient Care Supervisor  [facility]

## 2021-04-05 NOTE — TELEPHONE ENCOUNTER
Susana Newcomer, CNP, Pain provider, has an in person appointment scheduled with the patient tomorrow afternoon. The patient has been contacted for other time options to accommodate the program he is in if needed. The patient did not reach out to the Pain provider as directed when the suboxone was not effective; we are not able to provide additional recommendations unless we are aware of the issues the patient is having. In order to move forward, the patient needs to be seen and follow through with directions for follow up.     LETHA Parker, RN-BC  Patient Care Supervisor  [facility]

## 2021-04-05 NOTE — GROUP NOTE
Psychoeducation Group Note    PATIENT'S NAME: Jailene Breen  MRN:   [id number]  :   1965  ACCT. NUMBER: [id number]  DATE OF SERVICE: 21  START TIME:  9:00 AM  END TIME:  9:50 AM  FACILITATOR: Gris Joshua RN  TOPIC: [facility] RN Group: Mental Health Maintenance  [facility] Adult Partial Hospitalization Program  TRACK: 1    NUMBER OF PARTICIPANTS: 9    Summary of Group / Topics Discussed:  Mental Health Maintenance:  Assessments of Strengths: Patients completed a self-reflection on personal strengths worksheet. The concept of personal strength as it relates to resilience were explored. Patients shared responses with the group and participated in discussion.     Patient Session Goals / Objectives:  ? Patients identified 1-3 qualities they consider a personal strength.  ? Patients understood the concept of personal strengths and the connection it has to resiliency  Telemedicine Visit: The patient's condition can be safely assessed and treated via synchronous audio and visual telemedicine encounter.      Reason for Telemedicine Visit: Services only offered telehealth    Originating Site (Patient Location): Patient's home    Distant Site (Provider Location): Provider Remote Setting    Consent:  The patient/guardian has verbally consented to: the potential risks and benefits of telemedicine (video visit) versus in person care; bill my insurance or make self-payment for services provided; and responsibility for payment of non-covered services.     Mode of Communication:  Video Conference via [employer]    As the provider I attest to compliance with applicable laws and regulations related to telemedicine.       Patient Participation / Response:  Moderately participated, sharing some personal reflections / insights and adequately adequately received / provided feedback with other participants.    Demonstrated understanding of topics discussed through group discussion and participation    Treatment  Plan:  Patient has a current master individualized treatment plan.  See Epic treatment plan for more information.    Gris Joshua RN

## 2021-04-05 NOTE — PROGRESS NOTES
[facility]   Dr. Adkins's Psychiatric Progress Note  2021      Patient:  Jailene Brene   Medical Record Number:  [id number]  :  1965    Telemedicine Visit: The patient's condition can be safely assessed and treated via synchronous audio and visual telemedicine encounter.       Reason for Telemedicine Visit: COVID-19 lockdown     Originating Site (Patient Location):  HOME     Distant Site (Provider Location): [facility]: [place]     Consent:  The patient/guardian has verbally consented to: the potential risks and benefits of telemedicine (video visit) versus in person care; bill my insurance or make self-payment for services provided; and responsibility for payment of non-covered services.        Interim History:   The patient's care was discussed with the treatment team and chart notes were reviewed.    21:  Pt sees rheum this morning.  Appt was ok.  Needs to find another rheum doc as she's leaving.  Pt has low Vit D.  He'll start high dose Vit D.  Dealing with financial issues.  Weekend plans: get outside and shoot photos.      21:  Weekend was ok.  He got outside with in-laws and the dogs and went walking.  Mood over the weekend was blah blah.  It's a little bit better than a week ago.  Mood is same with structured time than not.  In group he gets anxious when it's his turn to talk.  This weekend was the best he's felt in a long time.      Psychiatric ROS:  Mood:  Better but still down  Sleep:  Off and on;  Sat. Morning slept til 11 AM and got 11 hours;  Next night only got 2 hours  Appetite:  normal  Eating:  normal  Energy Level:  Not quite there yet;  Pretty low;    Concentration/Memory Problems:  NO  Suicidal Thoughts:No  Homicidal Thoughts:No  Psychotic Symptoms: No  Medication Side Effects:No  Medication Compliance:Yes   Physical Complaints:  positive for pain all over; worst in hip, lower back and knees;  suboxone does not help;  Pt  sees pain doc on 4/6/21.           Medications:     PAST PSYCH MEDS:  Prozac, Lexapro, nortriptyline, Remeron, Abilify, trazodone, melatonin, Klonopin    Current Outpatient Medications   Medication Sig     acetaminophen (TYLENOL) 500 MG tablet Take 1,000 mg by mouth every 6 hours as needed for mild pain (headache)     ARIPiprazole (ABILIFY) 2 MG tablet Take 1 tablet (2 mg) by mouth daily     atenolol (TENORMIN) 25 MG tablet Take 1 tablet (25 mg) by mouth daily     buprenorphine HCl-naloxone HCl (SUBOXONE) 4-1 MG per film Place 1 Film under the tongue daily     chlorzoxazone (PARAFON FORTE) 500 MG tablet Take 1 tablet (500 mg) by mouth 3 times daily as needed for muscle spasms     cholecalciferol (VITAMIN D3) 125 mcg (5000 units) capsule Take 125 mcg by mouth daily     clonazePAM (KLONOPIN) 1 MG tablet TAKE ONE-HALF - 1 TABLET BY MOUTH ONCE DAILY AS NEEDED FOR ANXIETY     diclofenac (VOLTAREN) 1 % topical gel Apply 2 g topically 4 times daily     etanercept (ENBREL SURECLICK) 50 MG/ML autoinjector Inject 50 mg Subcutaneous once a week (Patient taking differently: Inject 50 mg Subcutaneous once a week Every Tuesday)     fish oil-omega-3 fatty acids 1000 MG capsule Take 1 g by mouth daily     FLUoxetine (PROZAC) 40 MG capsule Take 1 capsule (40 mg) by mouth daily     folic acid (FOLVITE) 1 MG tablet Take 5 tablets (5 mg) by mouth daily     Lidocaine (LIDOCARE) 4 % Patch Place 1-3 patches onto the skin every 24 hours To prevent lidocaine toxicity, patient should be patch free for 12 hrs daily.     lidocaine (XYLOCAINE) 5 % external ointment Apply topically every 4 hours as needed for moderate pain     liothyronine (CYTOMEL) 25 MCG tablet Take 1 tablet (25 mcg) by mouth daily     melatonin 3 MG tablet Take 1 tablet (3 mg) by mouth At Bedtime     menthol, Topical Analgesic, 2.5% (BENGAY VANISHIN SCENT) 2.5 % GEL topical gel Apply topically every 6 hours as needed for moderate pain     methotrexate sodium 2.5 MG TABS Take  "4 tablets (10 mg) by mouth every 7 days (Patient taking differently: Take 4 tablets by mouth every 7 days Every tuesday)     multivitamin (CENTRUM SILVER) tablet Take 1 tablet by mouth daily     nicotine polacrilex (NICORETTE) 4 MG gum PLACE 1 PIECE INSIDE THE CHEEK AS NEEDED FOR SMOKING CESSATION     omeprazole (PRILOSEC) 20 MG DR capsule Take 20 mg by mouth daily      prazosin (MINIPRESS) 1 MG capsule Take 1 capsule (1 mg) by mouth At Bedtime     traZODone (DESYREL) 50 MG tablet Take 0.5 tablets (25 mg) by mouth At Bedtime . With additional 0.25 tablet (12.5mg) as needed daily for anxiety. (Patient taking differently: Take 50 mg by mouth At Bedtime )     No current facility-administered medications for this encounter.              Allergies:     Allergies   Allergen Reactions     Mirtazapine      Other reaction(s): Coma     Hydrocodone Itching     Ms Contin [Morphine] Itching     Remeron Soltab Other (See Comments)     \"Blacked out\" for one week            Psychiatric Examination:   There were no vitals taken for this visit.  Weight is 0 lbs 0 oz  There is no height or weight on file to calculate BMI.    Appearance:  awake, alert and adequately groomed  Attitude:  cooperative  Eye Contact:  good  Mood:   Better but depressed  Affect:  mood congruent  Speech:  clear, coherent  Psychomotor Behavior:  no evidence of tardive dyskinesia, dystonia, or tics  Throught Process:  logical  Associations:  no loose associations  Thought Content:  no evidence of suicidal ideation or homicidal ideation and no evidence of psychotic thought  Insight:  good  Judgement:  intact  Oriented to:  time, person, and place  Attention Span and Concentration:  intact  Recent and Remote Memory:  intact  Gait:Normal    Risk/Potential for Dangerousness:  Multiple Active Diagnoses:HIGH  Self Care:HIGH  Suicide:LOW  Assault:LOW  Self Injurious Behaviors:LOW  Inappropriate Sexual Behavior:LOW         Labs:   No results found for this or any " previous visit (from the past 24 hour(s)).     Recent Results (from the past 1008 hour(s))   Asymptomatic SARS-CoV-2 COVID-19 Virus (Coronavirus) by PCR    Collection Time: 03/16/21 12:47 PM    Specimen: Nasopharyngeal   Result Value Ref Range    SARS-CoV-2 Virus Specimen Source Nasopharyngeal     SARS-CoV-2 PCR Result NEGATIVE     SARS-CoV-2 PCR Comment (Note)    Drug abuse screen 77 urine (FL, RH, SH)    Collection Time: 03/16/21  1:23 PM   Result Value Ref Range    Amphetamine Qual Urine Negative NEG^Negative    Barbiturates Qual Urine Negative NEG^Negative    Benzodiazepine Qual Urine Negative NEG^Negative    Cannabinoids Qual Urine Negative NEG^Negative    Cocaine Qual Urine Negative NEG^Negative    Opiates Qualitative Urine Negative NEG^Negative    PCP Qual Urine Negative NEG^Negative   CBC with platelets differential    Collection Time: 03/17/21  7:48 AM   Result Value Ref Range    WBC 8.3 4.0 - 11.0 10e9/L    RBC Count 4.95 4.4 - 5.9 10e12/L    Hemoglobin 15.0 13.3 - 17.7 g/dL    Hematocrit 44.1 40.0 - 53.0 %    MCV 89 78 - 100 fl    MCH 30.3 26.5 - 33.0 pg    MCHC 34.0 31.5 - 36.5 g/dL    RDW 13.3 10.0 - 15.0 %    Platelet Count 211 150 - 450 10e9/L    Diff Method Automated Method     % Neutrophils 53.5 %    % Lymphocytes 33.0 %    % Monocytes 8.0 %    % Eosinophils 4.3 %    % Basophils 1.0 %    % Immature Granulocytes 0.2 %    Nucleated RBCs 0 0 /100    Absolute Neutrophil 4.4 1.6 - 8.3 10e9/L    Absolute Lymphocytes 2.7 0.8 - 5.3 10e9/L    Absolute Monocytes 0.7 0.0 - 1.3 10e9/L    Absolute Eosinophils 0.4 0.0 - 0.7 10e9/L    Absolute Basophils 0.1 0.0 - 0.2 10e9/L    Abs Immature Granulocytes 0.0 0 - 0.4 10e9/L    Absolute Nucleated RBC 0.0    Comprehensive metabolic panel    Collection Time: 03/17/21  7:48 AM   Result Value Ref Range    Sodium 142 133 - 144 mmol/L    Potassium 4.1 3.4 - 5.3 mmol/L    Chloride 110 (H) 94 - 109 mmol/L    Carbon Dioxide 27 20 - 32 mmol/L    Anion Gap 5 3 - 14 mmol/L     Glucose 92 70 - 99 mg/dL    Urea Nitrogen 12 7 - 30 mg/dL    Creatinine 1.12 0.66 - 1.25 mg/dL    GFR Estimate 74 >60 mL/min/[1.73_m2]    GFR Estimate If Black 86 >60 mL/min/[1.73_m2]    Calcium 8.4 (L) 8.5 - 10.1 mg/dL    Bilirubin Total 0.6 0.2 - 1.3 mg/dL    Albumin 3.3 (L) 3.4 - 5.0 g/dL    Protein Total 6.7 (L) 6.8 - 8.8 g/dL    Alkaline Phosphatase 91 40 - 150 U/L    ALT 30 0 - 70 U/L    AST 17 0 - 45 U/L   TSH with free T4 reflex and/or T3 as indicated    Collection Time: 03/17/21  7:48 AM   Result Value Ref Range    TSH 2.01 0.40 - 4.00 mU/L   Folate    Collection Time: 03/17/21  7:48 AM   Result Value Ref Range    Folate 17.2 >5.4 ng/mL   Vitamin B12    Collection Time: 03/17/21  7:48 AM   Result Value Ref Range    Vitamin B12 466 193 - 986 pg/mL   Drug abuse screen 6 urine (tox)    Collection Time: 03/21/21  2:05 AM   Result Value Ref Range    Amphetamine Qual Urine Negative NEG^Negative    Barbiturates Qual Urine Negative NEG^Negative    Benzodiazepine Qual Urine Negative NEG^Negative    Cannabinoids Qual Urine Negative NEG^Negative    Cocaine Qual Urine Negative NEG^Negative    Ethanol Qual Urine Negative NEG^Negative    Opiates Qualitative Urine Negative NEG^Negative   Asymptomatic Influenza A/B & SARS-CoV2 (COVID-19) Virus PCR Multiplex    Collection Time: 03/21/21  6:34 AM    Specimen: Nasopharyngeal   Result Value Ref Range    Flu A/B & SARS-COV-2 PCR Source Nasopharyngeal     SARS-CoV-2 PCR Result NEGATIVE     Influenza A PCR Negative NEG^Negative    Influenza B PCR Negative NEG^Negative    Respiratory Syncytial Virus PCR (Note)     Flu A/B & SARS-CoV-2 PCR Comment (Note)    CBC with platelets differential    Collection Time: 03/21/21  8:08 AM   Result Value Ref Range    WBC 6.8 4.0 - 11.0 10e9/L    RBC Count 5.18 4.4 - 5.9 10e12/L    Hemoglobin 15.6 13.3 - 17.7 g/dL    Hematocrit 45.7 40.0 - 53.0 %    MCV 88 78 - 100 fl    MCH 30.1 26.5 - 33.0 pg    MCHC 34.1 31.5 - 36.5 g/dL    RDW 13.1 10.0 -  15.0 %    Platelet Count 246 150 - 450 10e9/L    Diff Method Automated Method     % Neutrophils 53.7 %    % Lymphocytes 31.9 %    % Monocytes 8.9 %    % Eosinophils 4.2 %    % Basophils 1.0 %    % Immature Granulocytes 0.3 %    Nucleated RBCs 0 0 /100    Absolute Neutrophil 3.7 1.6 - 8.3 10e9/L    Absolute Lymphocytes 2.2 0.8 - 5.3 10e9/L    Absolute Monocytes 0.6 0.0 - 1.3 10e9/L    Absolute Eosinophils 0.3 0.0 - 0.7 10e9/L    Absolute Basophils 0.1 0.0 - 0.2 10e9/L    Abs Immature Granulocytes 0.0 0 - 0.4 10e9/L    Absolute Nucleated RBC 0.0    Comprehensive metabolic panel    Collection Time: 03/21/21  8:08 AM   Result Value Ref Range    Sodium 141 133 - 144 mmol/L    Potassium 4.0 3.4 - 5.3 mmol/L    Chloride 108 94 - 109 mmol/L    Carbon Dioxide 29 20 - 32 mmol/L    Anion Gap 4 3 - 14 mmol/L    Glucose 95 70 - 99 mg/dL    Urea Nitrogen 12 7 - 30 mg/dL    Creatinine 0.92 0.66 - 1.25 mg/dL    GFR Estimate >90 >60 mL/min/[1.73_m2]    GFR Estimate If Black >90 >60 mL/min/[1.73_m2]    Calcium 8.3 (L) 8.5 - 10.1 mg/dL    Bilirubin Total 0.7 0.2 - 1.3 mg/dL    Albumin 3.5 3.4 - 5.0 g/dL    Protein Total 7.2 6.8 - 8.8 g/dL    Alkaline Phosphatase 100 40 - 150 U/L    ALT 34 0 - 70 U/L    AST 20 0 - 45 U/L   TSH with free T4 reflex    Collection Time: 03/21/21  8:08 AM   Result Value Ref Range    TSH 2.39 0.40 - 4.00 mU/L         Impression:   This is a 55 year old male continues PHP for mood stabilization.  Mood is doing a little better.  He feels the meds are starting to kick in.  Groups are going well.          DIagnoses:     Axis I:  Major depressive disorder, recurrent.  Generalized anxiety disorder.  Panic disorder.  Alcohol use disorder, in remission.   Axis II:  Deferred.   Axis III:  Chronic pain.          Plan:     Continue [facility] Program.   Continue Abilify and Prozac, which were recently started during his partial stays.   Expect stabilization and  completion of Partial Hospital Program.   We will reevaluate psychotropic medications during his partial hospital stay.   Follow-up with Dr. Sage and therapist at the [facility].     Jw Adkins MD

## 2021-04-05 NOTE — TELEPHONE ENCOUNTER
"Reached out immediately to patient by phone regarding his [employer] message.   -very frustrated  -pain provider took him off oxycodone and replaced with suboxone  -in PHP while writer was on the phone with him  -\"suboxone doesn't do nothing for pain\"  -at least oxycodone helped a little    -\"The only way I can think to end the pain is to end my life.\"   - Patient also stated that if he really wanted to kill himself, he wouldn't have gone to hospital and he would not ne attending the PHP program.  -Patient is declining to return to the hospital at this time because he is sure that is why he is in the position that he is in right now.  -He stated that the pain doctor would not have taken him off oxycodone if he hadn't been hospitalized.    Writer reassured patient that our office would reach out to pain specialist directly and request assistance from psychiatric provider.        "

## 2021-04-05 NOTE — TELEPHONE ENCOUNTER
Per patient myChart message:  From: Jamison Breen      Created: 4/4/2021 7:59 PM      Hi I believe that partial hospitalization is from 1pm- 6 on Tuesday.  Could we change the visit to a video visit so I don't miss that much,  thanks.      _______________    Mychart sent to pt:  From: Cha Molina RN      Created: 4/5/2021 9:09 AM      Hi Jamison,   I know that JUANITA Ramon CNP wants this to be an in-person visit only, not a virtual. If I remember correctly, you have new areas of pain.  She is unable to evaluate this via a video visit.  She has other morning and afternoon appointments available tomorrow.  If able, you can call to reschedule for a different [phone].    She is only doing in-person visits once a week so hopefully you are able to come tomorrow at a different time. Let the schedulers know to make it a 60 minute visit if able.     SANDRA Eubanks-BSN  [facility]

## 2021-04-06 ENCOUNTER — OFFICE VISIT (OUTPATIENT)
Dept: PALLIATIVE MEDICINE | Facility: CLINIC | Age: 56
End: 2021-04-06
Payer: COMMERCIAL

## 2021-04-06 ENCOUNTER — HOSPITAL ENCOUNTER (OUTPATIENT)
Dept: BEHAVIORAL HEALTH | Facility: CLINIC | Age: 56
End: 2021-04-06
Attending: PSYCHIATRY & NEUROLOGY
Payer: COMMERCIAL

## 2021-04-06 VITALS — HEART RATE: 62 BPM | DIASTOLIC BLOOD PRESSURE: 90 MMHG | SYSTOLIC BLOOD PRESSURE: 137 MMHG

## 2021-04-06 DIAGNOSIS — M25.551 HIP PAIN, RIGHT: ICD-10-CM

## 2021-04-06 DIAGNOSIS — G89.4 CHRONIC PAIN SYNDROME: ICD-10-CM

## 2021-04-06 DIAGNOSIS — G89.29 CHRONIC BILATERAL LOW BACK PAIN WITH RIGHT-SIDED SCIATICA: ICD-10-CM

## 2021-04-06 DIAGNOSIS — F11.20 UNCOMPLICATED OPIOID DEPENDENCE (H): Primary | ICD-10-CM

## 2021-04-06 DIAGNOSIS — M51.369 DDD (DEGENERATIVE DISC DISEASE), LUMBAR: ICD-10-CM

## 2021-04-06 DIAGNOSIS — M45.7 ANKYLOSING SPONDYLITIS OF LUMBOSACRAL REGION (H): ICD-10-CM

## 2021-04-06 DIAGNOSIS — M54.41 CHRONIC BILATERAL LOW BACK PAIN WITH RIGHT-SIDED SCIATICA: ICD-10-CM

## 2021-04-06 DIAGNOSIS — M05.79 RHEUMATOID ARTHRITIS INVOLVING MULTIPLE SITES WITH POSITIVE RHEUMATOID FACTOR (H): ICD-10-CM

## 2021-04-06 DIAGNOSIS — M54.59 LUMBAR FACET JOINT PAIN: ICD-10-CM

## 2021-04-06 PROCEDURE — H0035 MH PARTIAL HOSP TX UNDER 24H: HCPCS | Mod: GT | Performed by: COUNSELOR

## 2021-04-06 PROCEDURE — H0035 MH PARTIAL HOSP TX UNDER 24H: HCPCS | Mod: 95

## 2021-04-06 PROCEDURE — 99215 OFFICE O/P EST HI 40 MIN: CPT | Mod: 95 | Performed by: NURSE PRACTITIONER

## 2021-04-06 PROCEDURE — H0035 MH PARTIAL HOSP TX UNDER 24H: HCPCS | Mod: 95 | Performed by: OCCUPATIONAL THERAPIST

## 2021-04-06 RX ORDER — BUPRENORPHINE AND NALOXONE 8; 2 MG/1; MG/1
FILM, SOLUBLE BUCCAL; SUBLINGUAL
Qty: 15 EACH | Refills: 0 | Status: SHIPPED | OUTPATIENT
Start: 2021-04-06 | End: 2021-04-16

## 2021-04-06 ASSESSMENT — PAIN SCALES - GENERAL: PAINLEVEL: WORST PAIN (10)

## 2021-04-06 NOTE — PROGRESS NOTES
Adult Partial Hospitalization Program:  Individualized Treatment Plan     Date of Plan: 21    Name: Jailene Breen MRN: [id number]  :   1965    Programs: Adult Partial Hospitalization Program    DSM5 Primary Diagnosis:  Axis I:  Major depressive disorder, recurrent.  Generalized anxiety disorder.  Panic disorder.  Alcohol use disorder, in remission.   Axis II:  Deferred.   Axis III:  Chronic pain.      Adult Partial Hospitalization Program Multidisciplinary Team Members: Vale Pepe [facility], Yordy White LMFT, Liliya Dumont [facility],  Phoebe Joshua, SANDRA, Devon Rubin, MOTR/L; Karen Mejias OTR/L; Jw Adkins MD    Jailene Breen will participate in the Adult Partial Hospitalization Program 5 days per week, 5 hours per day. Anticipated duration/discharge: 2 weeks    Due to COVID-19, services will be delivered via telemedicine until further notice.     Program Start Date: 21  Anticipated Discharge Date: 21 (pending authorization/clinical changes)    NOTE: Complete CGI     Jailene Breen would not be at reasonable risk of requiring inpatient hospitalization in the absence of partial hospitalization.     Review Date: Does Jailene Breen continue to meet criteria to participate in the Partial Hospitalization Program, 5 days per week; 5 hours per day?   21 yes   21 Yes  Discharge - no       Client Strengths:  caring, intelligent, open to learning and open to suggestions / feedback    Client Areas of Vulnerability:  Depressive symptoms   Physical/medical: pain management problems     Client Participation in Plan:  Agrees with plan     Long-Term Goals:  Knowledge about illness and management of symptoms     Abuse Prevention Plan:  Safe, therapeutic environment     Discharge Criteria:  Satisfactory progress toward treatment goals        Areas of Treatment Focus       Area of Treatment Focus:  Personal Safety  Start Date:    21    Problem Description:  Reported he continues  have passive suicidal thoughts of wishing he would not wake up. Denied imminent plans to end life. He reported that the thoughts are around feeling worthless and that he is a burden to his family. He denied any imminent plans or thoughts to harm self or others. He has had 1 past suicide attempt about 5 years ago after death of his father. He got intoxicated and got a hotel room and attempted to hang self but rope broke and he woke up hung over. He did not seek care at that time. He reported in 2002 he was hospitalized after cutting self with painting utensil due to depression. Engaged in creating safety plan. Protective factors: no access to guns, lives with others, has support of wife, kids and , is sober, working towards a college degree, is help seeking, has psychiatrist, adheres with meds, is in couples counseling and is waiting to start individual therapy.    Goal: Target Date: 4-14-21, discharge Status: Stopped  1) Safety:   Client will notify staff when needing assistance to develop or implement a coping plan to manage suicidal or self injurious urges.  Client will use coping plan for safety, as needed.    Progress:  04/06/2021: Met with team members. Discussed program, process, and progress. Discussed and set treatment goals.Patient agrees with goal will continue until next review.  04/12/2021: Met with team members. Discussed program, process, and progress. Jamison continues to struggle with suicidal ideation that increases with pain management problems, which Jamison reports have increased since his doctor switched him from his pain medications to Suboxone.  Jamison continues to state he is able to utilize his safety plan to stay safe, and he will call 911 or check into the emergency department if he is no longer able to stay safe.  Discussed and set treatment goals.  Patient agrees with goal will continue until discharge.  04/14/21 - discharge: Jamison continues to struggle with suicidal ideation that increases  "with pain management problems.  Jamison continues to state he is able to utilize his safety plan to stay safe, and he will call 911 or check into the emergency department if he is no longer able to stay safe.     Treatment Strategies:  Assist clients in establishing / strengthening support network  Assist to identify treatment goals  Assist with discharge planning  Engage in safety planning when indicated  Facilitate increased self-awareness  Provide education regarding distress tolerance skills     Area of Treatment Focus:  Symptom Management  Start Date:    4-6-21    Description:     \"Suffering from severe depression and thoughts of suicide\"    Goal: Target Date: 4-14-21, discharge Status: stopped  2) In Psychotherapy groups process group,  will:   Identify and process emotions and learn 1 or 2 coping skills to manage emotions related to medical providers.     As measured by self report and staff observation during groups daily.       Progress:  04/06/2021: Met with team members. Discussed program, process, and progress. Discussed and set treatment goals. Patient agrees with goal will continue until next review.  04/12/2021: Patient consistently checks in regarding his ongoing challenges ongoing pain management problems and feeling hopeless and helpless. Patient feels put off by his providers and discussed challenges of getting his needs met with his medical provider.  Patient will continue learning skills to manage depression and processing feelings in group.  Continue until discharge.   04/14/21 - discharge: Met with team members. Discussed program, process, and progress. Jamison continues to report symptoms of depression.  He would like to continue additional support.    Treatment Strategies:  Assist clients in establishing / strengthening support network  Assist to identify treatment goals  Assist with discharge planning  Engage in safety planning when indicated  Facilitate increased self awareness  Provide education " regarding how to use group process, thoughts/behaviors/emotions management, emotional regulation, values identification, distorted thinking, grief and loss, shame, self compassion, self awareness, mindfulness, radical acceptance, distress tolerance skills, hope, problem solving. Communication/interpersonal effectiveness.      Area of Treatment Focus:  Develop / Improve Independent Living Skills  Start Date:    4-6-21    Description:   From DA completed on 3/18/2021:   Functional Status:  Patient reports the following functional impairments: health maintenance, management of the household and or completion of tasks, relationship(s), social interactions and work / vocational responsibilities.     From OT Assessment completed on 4/5/2021:  Les reports he wants to feel better as he is struggling with a lot of chronic pain that really impacts his ability to do anything.  See OT Assessment for additional information.     Goal: Target Date: 4-14-21, discharge Status: stopped  3) In Occupational Therapy groups Les will:    Lifestyle health: Learn, practice, apply 2 skills/strategies that support participation in meaningful activities with an emphasis on playing his guitar a couple of times a week to improve mental health management after discharge.       Self Regulation: Les will practice progressive muscle relaxation skills 2x/week and learn distress tolerance skills to support participation in meaningful ability to manage pain and muscle tension so that he is able to engage in meaningful roles, routines, relationships and responsibilities.        As measured by self report and staff observation during groups daily.     Progress:  4-6-21: Met with team members. Discussed program, process, and progress. Discussed and set treatment goals.OT Assessment completed.  Pain is a huge barrier for him.  Working with the pain clinic but struggles to find relief. Goal focus is on trying to add back some activities he has enjoyed and  found helpful in the past that he has trouble doing now. Continue goal.   04/12/2021: Met with team members. Discussed program, process, and progress. Jamison is trying not to nap during the day so he can sleep better during the day.  Discussed and set treatment goals. Patient agrees with goal will continue until discharge.  04/14/21 - discharge: Jamison has been going out of the house for walks with his dogs and some shopping trips with his wife.  He will continue to do these things as positive activities.    Treatment Strategies:  Assist to identify treatment goals  Engage in safety planning when indicated  Facilitate increased self awareness  OT assessment  Provide education regarding:  Lifestyle Health: balance/structure/routine, goal setting and integration, prioritizing and planning, leisure values, behavior activation, weaving a mindful lifestyle and benefits of focused activity,.  Self regulation: sensory modulation, window of tolerance, ANS and vagus nerve activation, self awareness, development of a self regulation plan, sensory enhanced mindfulness, sensory/body based grounding skills.  Resiliency development: Motivation, transitions, energy management, self compassion, stress management, positive thought processes, neuroscience of change.        Area of Treatment Focus:  Community Resources/Discharge Planning  Start Date:  4-6-21    Description:   Psychiatrist/Med Mgmt: Dr. Slim Sage next appointment    Individual Therapist: new therapist he hasn't met yet    Goal: Target Date: 4-14-21, discharge   Status: stopped    4) Wellness and Discharge preparation:     Will improve wellness related behaviors by attending wellness sessions and ask questions as they come up.    Will increase effective use of support / increase ability to ask for help. Invite someone to the Support Network Education group to discuss your support needs.    Will develop an aftercare / transition plan by scheduling individual therapy.  "   Progress:  04/06/2021: Met with team members. Discussed program, process, and progress. Jamison is not interested in day treatment services at this time.  Discussed and set treatment goals.  Patient agrees with goal will continue until next review.  04/12/21: Met with team members. Discussed program, process, and progress. Jamison continues to not be interested in day treatment services at this time. He will continue meeting with his psychiatrist and he will start meeting with a therapist (1/week therapy sessions recommended). He will continue having his wife for a support person.  He will reconnect with some of his past AA groups for additional support.  04/14/21 - discharge: Jamison decided he is interested in day treatment [facility] and he will be put on the waiting list.  He will continue meeting with his psychiatrist and he will start meeting with a therapist (1/week therapy sessions recommended). He will continue having his wife for a support person.  He will reconnect with some of his past AA groups for additional support.    Treatment Strategies:  Assist to identify treatment goals  Assist with discharge planning  Engage in safety planning when indicated  Facilitate increased self awareness  Provide education regarding eight dimensions of wellness. sleep hygiene. medication education and management. stigma. nutrition. signs and symptoms. community resources. support network education.      RAMOS Chavarria    NOTE: Required signatures are completed manually and scanned into the electronic medical record. See \"Media\" tab in epic.    The Individualized Treatment Plan Signature Page has been routed to the provider for co-sign.   RAMOS Chavarria on 4/6/2021 at 10:47 AM  RAMOS Chavarria on 4/12/2021 at 12:00 PM  RAMOS Chavarria on 4/17/2021 at 11:42 PM      "

## 2021-04-06 NOTE — TELEPHONE ENCOUNTER
Information noted.     I am closing the encounter as patient was seen in person this afternoon in clinic.     Susana GRANT RN CNP, FNP  [facility]

## 2021-04-06 NOTE — GROUP NOTE
Process Group Note    PATIENT'S NAME: Jailene Breen  MRN:   [id number]  :   1965  ACCT. NUMBER: [id number]  DATE OF SERVICE: 21  START TIME:  9:00 AM  END TIME:  9:50 AM  FACILITATOR: Yordy White [facility]; Vale Pepe [facility]  TOPIC:  Process Group    Diagnoses:  Axis I:  Major depressive disorder, recurrent.  Generalized anxiety disorder.  Panic disorder.  Alcohol use disorder, in remission.   Axis II:  Deferred.   Axis III:  Chronic pain.      [facility] Adult Partial Hospitalization Program  TRACK: [facility]    NUMBER OF PARTICIPANTS: 8    Telemedicine Visit: The patient's condition can be safely assessed and treated via synchronous audio and visual telemedicine encounter.      Reason for Telemedicine Visit: Services only offered telehealth and due to COVID-19.    Originating Site (Patient Location): Patient's home    Distant Site (Provider Location): Provider Remote Setting    Consent:  The patient/guardian has verbally consented to: the potential risks and benefits of telemedicine (video visit) versus in person care; bill my insurance or make self-payment for services provided; and responsibility for payment of non-covered services.     Mode of Communication:  Video Conference via [employer]    As the provider I attest to compliance with applicable laws and regulations related to telemedicine.            Data:    Session content: At the start of this group, patients were invited to check in by identifying themselves, describing their current emotional status, and identifying issues to address in this group.   Area(s) of treatment focus addressed in this session included Symptom Management, Personal Safety and Community Resources/Discharge Planning.  Patient reported feeling frustrated and nauseated. Patient discussed working toward meeting with pain clinic doctor to request to get his pain medications back. Patient identified people skills as skills they will use to address their  goal(s). Patient reported feeling very frustrated with his doctor stopping his pain medication may be a barrier to working toward their goal(s) and/or addressing mental health symptoms. Patient reported no safety concerns and/or self-injurious behaviors. Patient reported no substance use. Patient reported they are taking their medications as prescribed. Patient reported feeling proud that they showed up to group today. Patient discussed challenges in advocating for his own medications needs with his doctor with the treatment group.     Therapeutic Interventions/Treatment Strategies:  Psychotherapist offered support, feedback and validation and reinforced use of skills. Treatment modalities used include Motivational Interviewing and Cognitive Behavioral Therapy. Interventions include Coping Skills: Assisted patient in identifying 1-2 healthy distraction skills to reduce overall distress, Symptoms Management: Promoted understanding of their diagnoses and how it impacts their functioning and Emotions Management:  Discussed barriers to emotional regulation.    Assessment:    Patient response:   Patient responded to session by accepting feedback, giving feedback, listening, focusing on goals, being attentive and accepting support    Possible barriers to participation / learning include: physical discomfort and stress related feeling frustrated about how his doctor is handling his medications    Health Issues:   Yes: difficulties with pain management       Substance Use Review:   Substance Use: No active concerns identified.    Mental Status/Behavioral Observations  Appearance:   Appropriate   Eye Contact:   Good   Psychomotor Behavior: Normal   Attitude:   Cooperative   Orientation:   All  Speech   Rate / Production: Normal    Volume:  Normal   Mood:    Angry  Anxious  Depressed  Irritable  Normal  Affect:    Appropriate  Worrisome   Thought Content:   Clear and Safety denies any current safety concerns including  suicidal ideation, self-harm, and homicidal ideation  Thought Form:  Coherent  Logical     Insight:    Good     Plan:     Safety Plan: No current safety concerns identified.  Recommended that patient call 911 or go to the local ED should there be a change in any of these risk factors.     Barriers to treatment: None identified    Patient Contracts (see media tab):  None    Substance Use: Provided encouragement towards sobriety     Continue or Discharge: Patient will continue in Adult Partial Hospitalization Program (PHP)  as planned. Patient is likely to benefit from learning and using skills as they work toward the goals identified in their treatment plan.      Yordy White, [facility]  April 6, 2021

## 2021-04-06 NOTE — GROUP NOTE
Psychoeducation Group Note    PATIENT'S NAME: Jailene Breen  MRN:   [id number]  :   1965  ACCT. NUMBER: [id number]  DATE OF SERVICE: 21  START TIME: 11:00 AM  END TIME: 11:50 AM  FACILITATOR: Gris Joshua RN  TOPIC: ALTAF RN Group: [facility] Adult Partial Hospitalization Program  TRACK: 1    NUMBER OF PARTICIPANTS: 7    Summary of Group / Topics Discussed:  [facility]:  [facility]: self compassion   Patients will be introduced to the topic of self compassion, as described by Dr Virginia Muñoz.  A brief review of the research, highlighting mental health benefits of using self compassion will be shared.  # core components of self compassion will be discussed and contrasted with self criticism.  The pitfalls of self esteem, as wekll as the role of neurochemicals will also be discussed.    Patient Session Goals / Objectives:  ? Pts will gain an understanding of the concept of relating to ourselves from a self compassionate perspective  ? Pts will discuss the 3 core concepts of self compassion (mindfulness, common humanity, self-kindness)  ? Pts will generate a list of self compassion statements by completing an exercise in group  Telemedicine Visit: The patient's condition can be safely assessed and treated via synchronous audio and visual telemedicine encounter.      Reason for Telemedicine Visit: Services only offered telehealth    Originating Site (Patient Location): Patient's home    Distant Site (Provider Location): Provider Remote Setting    Consent:  The patient/guardian has verbally consented to: the potential risks and benefits of telemedicine (video visit) versus in person care; bill my insurance or make self-payment for services provided; and responsibility for payment of non-covered services.     Mode of Communication:  Video Conference via [employer]    As the provider I attest to compliance with applicable laws and regulations related to telemedicine.                  Patient Participation / Response:  Moderately participated, sharing some personal reflections / insights and adequately adequately received / provided feedback with other participants.    Demonstrated understanding of topics discussed through group discussion and participation    Treatment Plan:  Patient has a current master individualized treatment plan.  See Epic treatment plan for more information.    Gris Joshua RN

## 2021-04-06 NOTE — GROUP NOTE
Psychotherapy Group Note    PATIENT'S NAME: Jailene Breen  MRN:   [id number]  :   1965  ACCT. NUMBER: [id number]  DATE OF SERVICE: 21  START TIME:  1:00 PM  END TIME:  1:50 PM  FACILITATOR: Vale Pepe LPCC; Yordy White LPCC  TOPIC:  EBP Group: Coping Skills  [facility] Adult Partial Hospitalization Program  TRACK: [facility]    NUMBER OF PARTICIPANTS: 8    Summary of Group / Topics Discussed:  Coping Skills: Meditation: Patients learned about meditation and explored how and when to utilize it to increase focus, reduce mental health symptoms, decrease physical tension, and improve mental well-being.  Approaches to meditation were presented as a means of increasing self-awareness. The benefits of various meditation practices were discussed, as well as barriers to utilization of this coping strategy.     Patient Session Goals / Objectives:    Understand the purpose and efficacy of using meditation modalities to reduce stress / symptoms.    Review / discuss situations in daily life that cause distress, where establishing a meditation routine or meditating as needed may improve functioning.      Verbalize understanding of how and when to apply grounding strategies to reduce distress and increase presence in the moment.    Practice meditation and address barriers to use in daily life.    Telemedicine Visit: The patient's condition can be safely assessed and treated via synchronous audio and visual telemedicine encounter.      Reason for Telemedicine Visit: Services only offered telehealth and due to COVID-19    Originating Site (Patient Location): Patient's home    Distant Site (Provider Location): Provider Remote Setting    Consent:  The patient/guardian has verbally consented to: the potential risks and benefits of telemedicine (video visit) versus in person care; bill my insurance or make self-payment for services provided; and responsibility for payment of non-covered services.     Mode  of Communication:  Video Conference via [employer]    As the provider I attest to compliance with applicable laws and regulations related to telemedicine.          Patient Participation / Response:  Moderately participated, sharing some personal reflections / insights and adequately adequately received / provided feedback with other participants.    Demonstrated understanding of topics discussed through group discussion and participation, Expressed understanding of the relevance / importance of coping skills at distressing times in life and Demonstrated knowledge of when to consider using a variety of coping skills in daily life    Treatment Plan:  Patient has a current master individualized treatment plan and today was our weekly review of the patient's progress.  See Epic treatment plan for progress / updates on goals and plan.    Vale Peep, [facility]

## 2021-04-06 NOTE — GROUP NOTE
Psychoeducation Group Note    PATIENT'S NAME: Jailene Breen  MRN:   [id number]  :   1965  ACCT. NUMBER: [id number]  DATE OF SERVICE: 21  START TIME: 10:00 AM  END TIME: 10:50 AM  FACILITATOR: Karen Mejias OTR/L  TOPIC: [facility] OT Group: Lifestyle Balance and Structure  [facility] Adult Partial Hospitalization Program  TRACK: 1    NUMBER OF PARTICIPANTS: 8    Summary of Group / Topics Discussed: Lifestyle Balance and Structure:  Motivation and Procrastination  Focus of the group is to support patients in identifying barriers to task completion, challenge negative self talk, and how their mental health symptoms impact their ability to engage in and complete tasks/activities.  Provided psychoeducation and helped patients identify skills they may employ to assist with task follow through.  Facilitated supportive discussion providing validation and support.     Patient Session Goals / Objectives:    Patient will be able to better identify barriers to task completion and how their mental health symptoms impact this    Patients will identify 1-2 ways they can challenge these barriers to help with task completion       Patient Participation / Response:  Moderately participated, sharing some personal reflections / insights and adequately adequately received / provided feedback with other participants.    Patient presentation: constricted affect; offered some feedback at times;  and Patient would benefit from additional opportunities to practice the content to be able to generalize it to their everyday life with increased intentionality, consistency, and efficacy in support of their psychiatric recovery    Treatment Plan:  Patient has a current master individualized treatment plan.  See Epic treatment plan for more information.    BRENNON Corea/BIA

## 2021-04-06 NOTE — PROGRESS NOTES
Acknowledgement of Current Treatment Plan       I have reviewed my treatment plan with my therapist / counselor on 4/6/21.   I agree with the plan as it is written in the electronic health record.    Name:      Signature:  Jailene Breen Client is unable to sign due to COVID-19  RAMOS Chavarria on 4/6/2021 at 10:44 AM     Dr. Jw Adkins MD  Psychiatrist    Liliya Dumont MS, [facility], BC-DMT  Psychotherapist     Yordy White MA, LMFT RAMOS Chavarria on 4/6/2021 at 10:44 AM     Vale Pepe MA, [facility]  Psychotherapist

## 2021-04-06 NOTE — ADDENDUM NOTE
Encounter addended by: Millie Domingo [facility] on: 4/6/2021 9:52 AM   Actions taken: Charge Capture section accepted

## 2021-04-06 NOTE — TELEPHONE ENCOUNTER
[address] message sent to patient requesting him to reach out to pain management clinic at his earliest convenience.

## 2021-04-06 NOTE — GROUP NOTE
Psychotherapy Group Note    PATIENT'S NAME: Jailene Breen  MRN:   [id number]  :   1965  ACCT. NUMBER: [id number]  DATE OF SERVICE: 21  START TIME:  2:00 PM  END TIME:  2:50 PM  FACILITATOR: Yordy White LPCC; Vale Pepe LPCC  TOPIC:  EBP Group: Coping Skills  [facility] Adult Partial Hospitalization Program  TRACK: [facility]    NUMBER OF PARTICIPANTS: 8    Summary of Group / Topics Discussed:  Coping Skills: Radical Acceptance: Patients learned radical acceptance as a way to tolerate heightened stress in the moment.  Patients identified situations that necessitate radical acceptance.  They focused on applying acceptance of the moment to tolerate difficult emotions and events. Patients discussed how to distinguish when this can be useful in their lives and when other skills are more relevant or helpful.    Patient Session Goals / Objectives:    Understand that some amount of pain exists for each of us in our lives    Process the difficulty of acceptance in our lives and benefits of radical acceptance to mood and functioning.    Demonstrate understanding of when to use the radical acceptance to tolerate distress by providing examples of situations where this could be applied.    Identify barriers to applying radical acceptance to difficult situations and explore strategies to overcome them    Telemedicine Visit: The patient's condition can be safely assessed and treated via synchronous audio and visual telemedicine encounter.      Reason for Telemedicine Visit: Services only offered telehealth and due to COVID-19.    Originating Site (Patient Location): Patient's home    Distant Site (Provider Location): Provider Remote Setting    Consent:  The patient/guardian has verbally consented to: the potential risks and benefits of telemedicine (video visit) versus in person care; bill my insurance or make self-payment for services provided; and responsibility for payment of non-covered  services.     Mode of Communication:  Video Conference via [employer]    As the provider I attest to compliance with applicable laws and regulations related to telemedicine.    Patient Participation / Response:  Minimally participated, only when prompted / asked.    Les was in group but did not participate in group discussion.    Treatment Plan:  Patient has a current master individualized treatment plan and today was our weekly review of the patient's progress.  See Epic treatment plan for progress / updates on goals and plan.    Yordy White, [facility]

## 2021-04-06 NOTE — TELEPHONE ENCOUNTER
Information noted.     Susana GRANT, RN CNP, FNP  [facility]  [facility] [place]

## 2021-04-06 NOTE — PROGRESS NOTES
"[facility]    4/6/2021        Chief complaint:   - lower  back pain radiates to the groin and down the right leg   -Bilateral knee pain  -hand pain are also sore      Interval history:   Jailene Breen is a 55 year old male is known to me for   Lumbar spondylosis, pain is worse with extension and rotation indicating a facetogenic component to pain  Lumbar degenerative disc disease  SI joint pain  Ankylosing spondylitis  Myofascial pain  Chronic pain syndrome  PMHx includes: Panic attacks, back pain, arthritis, anxiety, ankylosing spondylitis  PSHx includes: Right knee surgery ×2, left foot surgery right knee arthroscopy        Recommendations/plan at the last visit on 2/16/2021 included:  1. Recommended to increase activity while pacing himself  2. Physical Therapy:  Has referral to PHYSICAL THERAPY  3. Clinical Health Psychologist: working regularly with Jonna Farrell  4. Diagnostic Studies:  None  5. Medication Management:    1. Continue OxyContin 10mg every 12 hours,  fill 3/2, start 3/4  2.  continue oxycodone 5mg tabs of 3/day   3. Start chlorzoxazone 500mg three times daily as needed for muscle spasms  6. Further procedures recommended: none at present  7. Recommendations to PCP: see above  8. Follow up: 8 weeks in-person visit. Please call [phone] to make your follow-up appointment with me.          Since his last visit, Jailene Breen reports:    Interval history April 6, 2021  -Les states he has been feeling like \"hell.\"   -his low back is bothering him and it will radiate into his right groin and down his right leg to the foot.   -he is walking with a cane.   - He was hospitalized twice recently for suicidal ideation. Mood is still low. Denies SI   -he is attending the Partial Hospitalization program. This is all on Zoom. Lasts for 6 hours Monday through Friday.   -he is also working with his psychiatrist and psychologist in addition to that.  -he tries " "to walk a few times per week, he thinks this is about a half a mile or so.  -he also notes he tries not to go out much to avoid injury or over-activity.   -he is back on Enbrel and methotrexate. He notes that this is a bit helpful for his joint pain.   -he does not get any relief from the Subxone. He states it \"doesn't do anything.\" he relates he has been on OxyContin 20mg TID in the past. He is frustrated that I switched him off of Oxycodone/OxyContin when he was in the hospital for SI. We discussed again how being on full  Mu-agonist opiates are not a good option for him. Additionally, when we began working together on 3/31/2017, he was NOT on daily opiates. In the time we have worked together, he has gone from being on no opiates on a daily basis to #15 tabs/month of oxycodone to slowly increasing to Oxycodone 35mg/day in divided dosing using OxyContin 10mg BID and oxycodone 5mg TID PRN which is 52mg OME (oral morphine equivalent, also known as morphine milligram equivalent-MME). In that time, his functional level and his mood has deteriorated despite increasing opiate dosages. -  -discussed I am open to continuing Suboxone and recommend increasing to 4/1mg every 8 hours, a 50% increase. Patient stated that \"that isn't going to be high enough.\" when I asked what he felt might be helpful, he stated he felt the dosage should be tripled. Explained that doing so is not a safe choice. Additionally, discussed that Suboxone hits the peak pain relief (plateaus) at between 16-18mg/day as the mu-receptors are all flooded.   He is concerned re: having upcoming surgery for a hiatal hernia in May. Reviewed that he can be on short-acting opiates as his surgeon deems necessary, but that he would be tapered off of them and continue on Suboxone with me long term.       Interval history February 16, 2021  -He has had more pain since the right  RFA done 2/1/2021.  -he slipped taking his garbage out last week, he tweaked his back, " did not fall.    -he is off of his RA meds as he has an upper respiratory infection  -the extreme cold makes his arthritis pain worse  -he may be having a surgery to fix a hiatal hernia  -flexeril caused urinary retention, this was better when he stopped taking it.       Interval history 11/11/2020  -he has been having issues with emptying his bladder and he has had 2 Carrasco catheters in the near future  -he is trying to get in with a [facility]ist.  -we discussed how opiate medication can cause urinary hesitancy  -he feels like the urinary symptoms began a couple of weeks after he began lexapro.  -he states he recently found out that his biological dad has had issues with his bladder.   -he had 2nd lumbar medial branch block done today with Dr. Ashlyn Gamble, we are working toward lumbar RFA..       Interval history 9/25/2020  -He has been sick 10 days ago,  had been on antibiotics and was not able to take his RA meds. He then felt better and then is now feeling worse again. Had been tested for COVID-19  -how his whole house is not feeling well.   -he is off of his RA meds, and so his pain is worse because he doesn't feel well, he has a really deep cough.  -having 2nd LMBB next week on the left side, then plan to do bilateral lumbar RFA  -he can't sleep due to body aches and then his back pain is worse.   -recently seen by psychiatry for first time this week, placed on escitalopram for depression/anxiety and hydroxyzine for panic/anxiety PRN, Les tells me that the psychiatrist wants him to stop using the clonazepam.       Interval history 6/10/2020  -having bilateral low back pain that radiates into the groin at times  -having multiple joint pain from RA, his RA flare is better as he is back on the Enbrel now  -he would love to get a new bed as he has issues getting comfortable in bed.   -he is having more left sided low back pain, worse with lumbar extension and extension and rotation. He is interested in  "pursing possible left sided lumbar RFA.      Interval 2020  -his 28 year old niece overdosed and  last Saturday, she had a 12,5 and 1 year old.   -he is going to her  today  -he did get his Enbrel yesterday  -he is going to see a new rheumatologist in July with the [employer]    -he says his pain is now a bit better  -he has multiple joint pain from RA and chronic low back pain        At this point, the patient's participation with our multidisciplinary team includes:  The patient has been compliant with the program.    PT - has seen Cyndee White, none recently  Health Psych - worked with  Padilla Keene, last on 2018.  Working with Jonna Farrell PhD and been seen >8 times since 2020         Pain scores:    Pain intensity on average is 9 on a scale of 0-10.    Range is 9-10/10.   Pain right now is 10/10.   Pain is described as \"shooting, throbbing, penetrating, burning, and stabbing.\"  Pain is constant in nature    Current pain relevant medications:      -nortriptyline 50mg at bedtime (helpful)  -Enbrel 50mg/mL   -ibuprofen 800mg TID PRN (using 3 times daily-helpful)  -Tylenol 500mg using 1000mg TID (unsure if helpful)  -Maxalt 10mg PRN migraine (helpful for migraine--he does have visual aura)  -Suboxone 8/2mg using 4mg/1mg twice daily (\"it doesn't do nothing for the pain.\")  -naloxone nasal spray PRN for accidental opiate overdose        Other pertinent medications:   -clonazepam 1mg tab, may take 0.5-1mg BID PRN anxiety (helpful, has been using infrequently)  -Escitalopram 10mg (just started this with psychiatry 3 days ago)  -hydroxyzine 25mg once daily as needed for anxiety/panic (just prescribed by psychiatry 3 days ago)      Previous Medications:   OPIATES: Oxycodone (helpful), Fentanyl (100mcg/hr patch helpful), hydrocodone (itching), Tramadol (not helpful)   NSAIDS: ibuprofen (not helpful), Aleve (not helpful)  MUSCLE RELAXANTS: methocarbamol (somewhat helpful), Flexeril " (somewhat helpful but caused severe urinary retention requiring catheterization), tizanidine (unsure if helpful), metaxalone (unsure), SOMA (helpful)  ANTI-MIGRAINE MEDS: Maxalt (helpful for migraine), Imitrex injection (somewhat helpful)  ANTI-DEPRESSANTS: Prozac (helpful), Cymbalta (felt weird on it), nortriptyline (unsure if helpful), Buspar (unsure), Remeron (blacked out), bupropion (not helpful for smoking cessation)  SLEEP AIDS: Ambien (helpful)  ANTI-CONVULSANTS: gabapentin (felt odd on this medication, unsure of dose), Lyrica (not helpful for 4 months, unsure of dose), Topamax (not helpful, he felt weird on it)   TOPICALS: Lidocaine patches (not helpful), Voltaren gel (not helpful)  Other meds: Tylenol (unsure if helpful)        Other treatments have included:   Jailene Breen has been seen at a pain clinic in the past.  [facility]  PT: tried, not helpful  Chiropractic: tried, not helpful  Acupuncture: none  TENs Unit: tried, helpful     Injections:     -has had injections at outside clinics  -has had epidural injections for low back pain (one was helpful)  -he has had lumbar medial branch blocks at [facility] and he was going to have lumbar radiofrequency ablation (did not do this due to cost)  -4/3/2017 right LMBBs with Dr. Ashlyn Gamble (helpful)  -4/26/2017 right LMBBs with Dr. Ashlyn Gamble (helpful)  -5/31/2017 right L3, L4, L5 RFA with Dr. Ashlyn Gamble (good relief for over 6 months)  -3/15/2018 right L5 transforaminal MAKAYLA with Dr. Ashlyn Gamble (not helpful)  -5/10/2018 trigger point injections with Dr. Ashlyn Gamble(somewhat helpful).  -7/12/2018 Right L3, L4, L5 RFA with Dr. Ashlyn Gamble (helpful).  -9/20/2018 Left piriformis injections, bilateral gluteal trigger point with Dr. Gamble (helpful).  -1/31/2019 right L2-3, L3-4 and L4-5 facet joint injections with Dr. Ashlyn Gamble (somewhat helpful)  4/4/2019  right L3, L4, L5/sacral ala lumbar radiofrequency ablation with   "Tye (helpful over right side)  6/28/2019 Bilateral facet joint injections at l4-5, L5-S1 with Dr. Holley (only helpful for 7 days)  -2/12/2020 right Q4-B1-G9-sacral ALA RFA done with Dr. Ashlyn Gamble (helpful)  -8/26/2020 left L3-5 lumbar medial branch blocks #1 with Dr. Ashlyn Gamble (very helpful)  -11/11/2020 left L3-5 lumbar medial branch blocks #2 with Dr. Ashlyn Gamble (too soon to tell, done today helpful)   -2/1/2021 right lumbar RFA L4-5 and L5-S1 with Dr. Ashlyn Gamble   (this \"helped a little bit\" and then he tweaked his back about 2 weeks after it was done and it didn't helpful)          THE 4 A's OF OPIOID MAINTENANCE ANALGESIA    Analgesia: Suboxone 8/2mg/day is \"not helpful at all\"    Activity: very little activity. Goes on a short walk several times per week    Adverse effects: none    Adherence to Rx protocol: yes          Side Effects: no side effect  Patient is using the medication as prescribed: YES    Medications:  Current Outpatient Medications   Medication Sig Dispense Refill     acetaminophen (TYLENOL) 500 MG tablet Take 1,000 mg by mouth every 6 hours as needed for mild pain (headache)       ARIPiprazole (ABILIFY) 2 MG tablet Take 1 tablet (2 mg) by mouth daily 30 tablet 0     atenolol (TENORMIN) 25 MG tablet Take 1 tablet (25 mg) by mouth daily 90 tablet 1     buprenorphine HCl-naloxone HCl (SUBOXONE) 4-1 MG per film Place 1 Film under the tongue daily 28 each 0     chlorzoxazone (PARAFON FORTE) 500 MG tablet Take 1 tablet (500 mg) by mouth 3 times daily as needed for muscle spasms 45 tablet 1     cholecalciferol (VITAMIN D3) 125 mcg (5000 units) capsule Take 125 mcg by mouth daily       clonazePAM (KLONOPIN) 1 MG tablet TAKE ONE-HALF - 1 TABLET BY MOUTH ONCE DAILY AS NEEDED FOR ANXIETY 30 tablet 0     diclofenac (VOLTAREN) 1 % topical gel Apply 2 g topically 4 times daily 50 g 0     etanercept (ENBREL SURECLICK) 50 MG/ML autoinjector Inject 50 mg Subcutaneous once a week (Patient " taking differently: Inject 50 mg Subcutaneous once a week Every Tuesday) 50 mg 2     fish oil-omega-3 fatty acids 1000 MG capsule Take 1 g by mouth daily       FLUoxetine (PROZAC) 40 MG capsule Take 1 capsule (40 mg) by mouth daily 30 capsule 3     folic acid (FOLVITE) 1 MG tablet Take 5 tablets (5 mg) by mouth daily 150 tablet 3     Lidocaine (LIDOCARE) 4 % Patch Place 1-3 patches onto the skin every 24 hours To prevent lidocaine toxicity, patient should be patch free for 12 hrs daily. 90 patch 3     lidocaine (XYLOCAINE) 5 % external ointment Apply topically every 4 hours as needed for moderate pain 50 g 0     liothyronine (CYTOMEL) 25 MCG tablet Take 1 tablet (25 mcg) by mouth daily 30 tablet 3     melatonin 3 MG tablet Take 1 tablet (3 mg) by mouth At Bedtime 30 tablet 0     menthol, Topical Analgesic, 2.5% (BENGAY VANISHIN SCENT) 2.5 % GEL topical gel Apply topically every 6 hours as needed for moderate pain 45 g 3     methotrexate sodium 2.5 MG TABS Take 4 tablets (10 mg) by mouth every 7 days (Patient taking differently: Take 4 tablets by mouth every 7 days Every tuesday) 48 tablet 0     multivitamin (CENTRUM SILVER) tablet Take 1 tablet by mouth daily       nicotine polacrilex (NICORETTE) 4 MG gum PLACE 1 PIECE INSIDE THE CHEEK AS NEEDED FOR SMOKING CESSATION 200 each 3     omeprazole (PRILOSEC) 20 MG DR capsule Take 20 mg by mouth daily        prazosin (MINIPRESS) 1 MG capsule Take 1 capsule (1 mg) by mouth At Bedtime 30 capsule 3     traZODone (DESYREL) 50 MG tablet Take 0.5 tablets (25 mg) by mouth At Bedtime . With additional 0.25 tablet (12.5mg) as needed daily for anxiety. (Patient taking differently: Take 50 mg by mouth At Bedtime ) 30 tablet 1       Medical History: any changes in medical history since they were last seen? No    Social History:    Home situation: , has 2 grown children  Occupation/Schooling: currently unemployed, worked as a  (unemployed since 3/1/2017). 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  Uses nicotine gum  If yes, was cessation counseling offered?  None needed        Review of Systems:    The 14 system ROS was reviewed with patient and is positive for:  Constitutional: fever/chills, fatigue, weight gain, weight loss  Eyes/Head: headache, dizziness  ENT: ringing in ears   Allergy/Immune: allergies  Skin: itching, rash, hives  Hematologic: easy bruising  Respiratory: cough, wheezing, shortness of breath  Cardiovascular: swelling in feet, fainting, palpitations, chest pain  GI: abdominal pain, nausea, vomiting, diarrhea, constipation  Endocrine: steroid use  Musculoskeletal:  joint pain, arthritis, stiffness, gout, back pain, neck pain  Urinary: frequency, urgency, incontinence, hesitancy  Neurologic: weakness, numbness/tingling, seizure, stroke, memory loss  Mental health: depression, anxiety, stress, suicidal ideation          Physical Exam:     Vital signs: There were no vitals taken for this visit.     Behavioral observations:  Awake, alert. Cooperative. Flat affect     Gait:  slow, antalgic on the right. Uses a single ended cane     Musculoskeletal exam:   Lumbar:  Reduced ROM in all planes due to pain, particularly in lumbar extension and lumbar extension and rotation.   SI joints mildly tender  Piriformis non-tender  GTs non-tender.   SLR positive at 35 degrees    Hips:   Fair ROM of left hip with  Minimal pain  Reduced ROM of right hip, painful internal rotation more than external rotation. Painful flexion.     (patient is VERY hesitant with exam, holds breath, bears down, exhibiting fear of movement)     Neurological: SILT in all extremities      Skin/vascular/autonomic:  No suspicious lesions on exposed skin        [place] Prescription Monitoring  Program:  Reviewed [facility] 4/6/2021- no concerning fills.  Susana GRANT, RN CNP, FNP  [facility]        DIRE Score for selecting candidates for long term opioid analgesia for chronic pain:  Diagnosis  2  Intractablility  2  Risk    Psychological health  2    Chemical health  2    Reliability  2    Social support  2  Efficacy  2    Total DIRE Score = 14. Note that  7-13 predicts poor outcome (compliance and efficacy) from opioid prescribing; 14-21 predicts good outcome (compliance and efficacy)  from opioid prescribing.      Assessment:    1. Uncomplicated opiate dependence  2. Right hip pain  3. Chronic bilateral low back pain with right sided sciatica  4. Lumbar facet joint pain    5. DDD, lumbar  6. Ankylosing spondylitis of lumbosacral region  7. RA involving multiple joints  8. Chronic pain syndrome  9. Encounter for long-term opiate analgesic use  10. PMHx includes: Panic attacks, back pain, arthritis, anxiety, ankylosing spondylitis  11. PSHx includes: Right knee surgery ×2, left foot surgery right knee arthroscopy      Plan:    1. Recommended to increase activity while pacing himself  2. Physical Therapy:  Let's do this once you are done with the PHP program  3. Clinical Health Psychologist: keep working with your psychiatrist and therapist  4. Diagnostic Studies:  Has lumbar MRI schedule tomorrow   5. Medication Management:    1. continue chlorzoxazone 500mg three times daily as needed for muscle spasms  2. INCREASE Suboxone 8/2mg films, use 0.5 film under the tongue every 8 hours. Fill/begin 4/6 to last 10 days  3. Consider medical cannabis, I have a note out to Dr. Sage  4. Consider restarting Lyrica in the future  6. Further procedures recommended: none at present  7. Recommendations to PCP: see above  8. Follow up: 2-3 weeks via video due to COVID-19 viral threat. Please call [phone] to make your follow-up appointment with me.         BILLING TIME DOCUMENTATION:    The total TIME spent on this patient on the day of the appointment was  80 minutes.      TOTAL TIME includes:   Time spent preparing to see the patient: 3 minutes (reviewing records and tests)  Time spend face to face with the patient: 52 minutes face to face in the exam room  Time spent ordering tests, medications, procedures and referrals: 0 minutes  Time spent Referring and communicating with other healthcare professionals: 5 minutes (message to Dr Booth and Dr. Sage of psychiatry)  Documenting clinical information in Epic: 20 minutes            Susana GRANT RN CNP, FNP  [facility]  [facility] [place]

## 2021-04-06 NOTE — PATIENT INSTRUCTIONS
Plan:    1. Recommended to increase activity while pacing himself  2. Physical Therapy:  Let's do this once you are done with the PHP program  3. Clinical Health Psychologist: keep working with your psychiatrist and therapist  4. Diagnostic Studies:  Has lumbar MRI schedule tomorrow   5. Medication Management:    1. continue chlorzoxazone 500mg three times daily as needed for muscle spasms  2. INCREASE Suboxone 8/2mg films, use 0.5 film under the tongue every 8 hours. Fill/begin 4/6 to last 10 days  3. Consider medical cannabis, I have a note out to Dr. Sage  4. Consider restarting Lyrica in the future  6. Further procedures recommended: none at present  7. Recommendations to PCP: see above  8. Follow up: 2-3 weeks via video due to COVID-19 viral threat. Please call [phone] to make your follow-up appointment with me    ----------------------------------------------------------------  Clinic Number:  [phone]     Call with any questions about your care and for scheduling assistance.     Calls are returned Monday through Friday between 8 AM and 4:30 PM. We usually get back to you within 2 business days depending on the issue/request.    If we are prescribing your medications:    For opioid medication refills, call the clinic or send a [email or web address] message 7 days in advance.  Please include:    Name of requested medication    Name of the pharmacy.    For non-opioid medications, call your pharmacy directly to request a refill. Please allow 3-4 days to be processed.     Per [place] State Law:    All controlled substance prescriptions must be filled within 30 days of being written.      For those controlled substances allowing refills, pickup must occur within 30 days of last fill.      We believe regular attendance is key to your success in our program!      Any time you are unable to keep your appointment we ask that you call us at least 24 hours in advance to cancel.This will allow us to offer the appointment time to  another patient.     Multiple missed appointments may lead to dismissal from the clinic.

## 2021-04-07 ENCOUNTER — HOSPITAL ENCOUNTER (OUTPATIENT)
Dept: BEHAVIORAL HEALTH | Facility: CLINIC | Age: 56
End: 2021-04-07
Attending: PSYCHIATRY & NEUROLOGY
Payer: COMMERCIAL

## 2021-04-07 PROCEDURE — H0035 MH PARTIAL HOSP TX UNDER 24H: HCPCS | Mod: 95 | Performed by: OCCUPATIONAL THERAPIST

## 2021-04-07 PROCEDURE — H0035 MH PARTIAL HOSP TX UNDER 24H: HCPCS | Mod: 95 | Performed by: COUNSELOR

## 2021-04-07 PROCEDURE — H0035 MH PARTIAL HOSP TX UNDER 24H: HCPCS | Mod: 95

## 2021-04-07 PROCEDURE — H0035 MH PARTIAL HOSP TX UNDER 24H: HCPCS | Mod: GT | Performed by: SOCIAL WORKER

## 2021-04-07 NOTE — GROUP NOTE
Process Group Note    PATIENT'S NAME: Jailene Breen  MRN:   [id number]  :   1965  ACCT. NUMBER: [id number]  DATE OF SERVICE: 21  START TIME: 10:00 AM  END TIME: 10:50 AM  FACILITATOR: Yordy White [facility]; [facility]  TOPIC:  Process Group    Diagnoses:  Axis I:  Major depressive disorder, recurrent.  Generalized anxiety disorder.  Panic disorder.  Alcohol use disorder, in remission.   Axis II:  Deferred.   Axis III:  Chronic pain.      [facility]  TRACK: [facility]    NUMBER OF PARTICIPANTS: 9    Telemedicine Visit: The patient's condition can be safely assessed and treated via synchronous audio and visual telemedicine encounter.      Reason for Telemedicine Visit: Services only offered telehealth and due to COVID-19.    Originating Site (Patient Location): Patient's home    Distant Site (Provider Location): Provider Remote Setting    Consent:  The patient/guardian has verbally consented to: the potential risks and benefits of telemedicine (video visit) versus in person care; bill my insurance or make self-payment for services provided; and responsibility for payment of non-covered services.     Mode of Communication:  Video Conference via [employer]    As the provider I attest to compliance with applicable laws and regulations related to telemedicine.            Data:    Session content: At the start of this group, patients were invited to check in by identifying themselves, describing their current emotional status, and identifying issues to address in this group.   Area(s) of treatment focus addressed in this session included Symptom Management, Personal Safety and Community Resources/Discharge Planning.  Patient reported feeling hopeless like a big hole is eating him up. Patient discussed working toward getting rid of the hopeless feeling. Patient identified getting out for a car ride with his dogs to get his mind off things as skills they  will use to address their goal(s). Patient reported feeling hopeless may be a barrier to working toward their goal(s) and/or addressing mental health symptoms. Patient reported no safety concerns and/or self-injurious behaviors, however, he did state he has thoughts he would be better off dead, but agreed to use his safety plan to stay safe. Patient reported no substance use. Patient reported they are taking their medications as prescribed. Patient reported feeling proud that they made it to group today. Patient discussed challenges of dealing with his hopeless feeling with the treatment group.     Therapeutic Interventions/Treatment Strategies:  Psychotherapist offered support, feedback and validation and reinforced use of skills. Treatment modalities used include Motivational Interviewing and Cognitive Behavioral Therapy. Interventions include Coping Skills: Assisted patient in identifying 1-2 healthy distraction skills to reduce overall distress, Symptoms Management: Promoted understanding of their diagnoses and how it impacts their functioning and Emotions Management:  Discussed barriers to emotional regulation.    Assessment:    Patient response:   Patient responded to session by accepting feedback, giving feedback, listening, focusing on goals, being attentive and accepting support    Possible barriers to participation / learning include: severity of symptoms    Health Issues:   Yes: ongoing difficulties with pain management       Substance Use Review:   Substance Use: No active concerns identified.    Mental Status/Behavioral Observations  Appearance:   Appropriate   Eye Contact:   Good   Psychomotor Behavior: Normal   Attitude:   Cooperative   Orientation:   All  Speech   Rate / Production: Normal    Volume:  Normal   Mood:    Anxious  Depressed  Normal Sad   Affect:    Appropriate  Subdued   Thought Content:   Clear and Safety denies any current safety concerns including suicidal ideation, self-harm, and  homicidal ideation and Safety concerns have increased  Thought Form:  Coherent  Logical     Insight:    Good     Plan:     Safety Plan: Recommended that patient call 911 or go to the local ED should there be a change in any of these risk factors.    Committed to safety and agreed to follow previously developed safety coping plan.      Barriers to treatment: None identified    Patient Contracts (see media tab):  None    Substance Use: Provided encouragement towards sobriety     Continue or Discharge: Patient will continue in Adult Partial Hospitalization Program (PHP)  as planned. Patient is likely to benefit from learning and using skills as they work toward the goals identified in their treatment plan.      Yordy White, [facility]  April 7, 2021

## 2021-04-07 NOTE — GROUP NOTE
Psychotherapy Group Note    PATIENT'S NAME: Jailene Breen  MRN:   [id number]  :   1965  ACCT. NUMBER: [id number]  DATE OF SERVICE: 21  START TIME:  2:00 PM  END TIME:  2:50 PM  FACILITATOR: Liliya Dumont LICSW  TOPIC: MH EBP Group: Enhanced Mindfulness  [facility] Adult Partial Hospitalization Program  TRACK: 1    NUMBER OF PARTICIPANTS: 10    Summary of Group / Topics Discussed:  Enhanced Mindfulness: Body and Mind Integration: Patients received an overview and education regarding the importance of including the body in the management of emotional health and self-care and as a direct route to mindfulness practice.  Patients discussed various ways in which the body can serve as an informant to their physical and emotional experiences/need. Patients discussed the body as a direct link to the present moment and to mindfulness practice.  Patients discussed current relationship with body, self-awareness, mindfulness practice and barriers to connection with body.  Patients were guided through breath work and movement to facilitate greater self-awareness, grounding, self-expression, and connection to other.  Patients discussed how the experiential could be applied to better manage mental health and develop pickett connection to self.    Patient Session Goals / Objectives:    Identify how movement awareness could be used for grounding, stress management, self-expression, connection to other and self-regulation    Improved awareness of breath and movement preferences    Identify how movement and the body is used in mindfulness practice    Reflect on use of these practices in everyday life.    Identify barriers to attending to body    Telemedicine Visit: The patient's condition can be safely assessed and treated via synchronous audio and visual telemedicine encounter.          Reason for Telemedicine Visit: Services only offered telehealth and covid19        Originating Site (Patient Location):  Patient's home        Distant Site (Provider Location): Provider Remote Setting        Consent:  The patient/guardian has verbally consented to: the potential risks and benefits of telemedicine (video visit) versus in person care; bill my insurance or make self-payment for services provided; and responsibility for payment of non-covered services.         Mode of Communication:  Video Conference via [employer]        As the provider I attest to compliance with applicable laws and regulations related to telemedicine.         Patient Participation / Response:  Moderately participated, sharing some personal reflections / insights and adequately adequately received / provided feedback with other participants.    Demonstrated understanding of topics discussed through group discussion and participation and Practiced skills in session    Treatment Plan:  Patient has a current master individualized treatment plan.  See Epic treatment plan for more information.    WALLY ChatmanSW

## 2021-04-07 NOTE — ADDENDUM NOTE
Encounter addended by: Yordy White [facility] on: 4/7/2021 1:11 PM   Actions taken: Charge Capture section accepted

## 2021-04-07 NOTE — GROUP NOTE
Psychoeducation Group Note    PATIENT'S NAME: Jailene Breen  MRN:   [id number]  :   1965  ACCT. NUMBER: [id number]  DATE OF SERVICE: 21  START TIME:  9:00 AM  END TIME:  9:50 AM  FACILITATOR: Gris Joshua RN  TOPIC: ALTAF RN Group: [facility] Adult [facility] Hospitalization Program  TRACK: 1    NUMBER OF PARTICIPANTS: 9  Telemedicine Visit: The patient's condition can be safely assessed and treated via synchronous audio and visual telemedicine encounter.      Reason for Telemedicine Visit: Services only offered telehealth    Originating Site (Patient Location): Patient's home    Distant Site (Provider Location): Provider Remote Setting    Consent:  The patient/guardian has verbally consented to: the potential risks and benefits of telemedicine (video visit) versus in person care; bill my insurance or make self-payment for services provided; and responsibility for payment of non-covered services.     Mode of Communication:  Video Conference via [employer]    As the provider I attest to compliance with applicable laws and regulations related to telemedicine.   Summary of Group / Topics Discussed:  Foundations of Health: Nutrition: Macronutrients: Patient were provided education on major macronutrients, their role in the body, and why it is important to meet daily nutritional needs. Obstacles to meeting daily nutritional needs were identified in group discussion and strategies to promote improved nutrition were explored. Macronutrients discussed include: carbohydrates, proteins and amino acids, fats, fiber, and water.     Patient Session Goals / Objectives:  ? Discussed the role of diet on mood, physical health, energy level, and the development of chronic disease.  ? Identified daily nutritional needs recommended by the [employer] via My Plate education resources  ? Developing increased health literacy skills in finding credible nutrition information from reliable  sources        Patient Participation / Response:  Moderately participated, sharing some personal reflections / insights and adequately adequately received / provided feedback with other participants.    Demonstrated understanding of topics discussed through group discussion and participation    Treatment Plan:  Patient has a current master individualized treatment plan.  See Epic treatment plan for more information.    Gris Joshua RN

## 2021-04-07 NOTE — PROGRESS NOTES
[facility]   Dr. Adkins's Psychiatric Progress Note  2021      Patient:  Jailene Breen   Medical Record Number:  [id number]  :  1965    Telemedicine Visit: The patient's condition can be safely assessed and treated via synchronous audio and visual telemedicine encounter.       Reason for Telemedicine Visit: COVID-19 lockdown     Originating Site (Patient Location):  HOME     Distant Site (Provider Location): [facility]: [place]     Consent:  The patient/guardian has verbally consented to: the potential risks and benefits of telemedicine (video visit) versus in person care; bill my insurance or make self-payment for services provided; and responsibility for payment of non-covered services.        Interim History:   The patient's care was discussed with the treatment team and chart notes were reviewed.    21:  Pt sees rheum this morning.  Appt was ok.  Needs to find another rheum doc as she's leaving.  Pt has low Vit D.  He'll start high dose Vit D.  Dealing with financial issues.  Weekend plans: get outside and shoot photos.      21:  Weekend was ok.  He got outside with in-laws and the dogs and went walking.  Mood over the weekend was blah blah.  It's a little bit better than a week ago.  Mood is same with structured time than not.  In group he gets anxious when it's his turn to talk.  This weekend was the best he's felt in a long time.      21:  Pt could be doing better.  Not doing well.  Took prn Klonopin yesterday for anxiety.  On current Abilify and Prozac x 2 weeks.  Has some benefits.      Psychiatric ROS:  Mood:  Depressed;  Not anxious today  Sleep:  Very restless;  Doesn't wake feeling rested.    Appetite:  normal  Eating:  normal  Energy Level:  Very low  Concentration/Memory Problems:  NO  Suicidal Thoughts:  Passive SI;  No plan or intent;  Contracts no self harm;    Homicidal Thoughts:No  Psychotic Symptoms: No  Medication  Side Effects:No  Medication Compliance:Yes   Physical Complaints:  positive for pain all over; worst in hip, lower back and knees;  suboxone does not help;  Saw pain doc yesterday.  He's frustrated with suboxone, which doesn't help even after doc doubled the dose.  Considering medical marijuana.           Medications:     PAST PSYCH MEDS:  Prozac, Lexapro, nortriptyline, Remeron, Abilify, trazodone, melatonin, Klonopin    Current Outpatient Medications   Medication Sig     acetaminophen (TYLENOL) 500 MG tablet Take 1,000 mg by mouth every 6 hours as needed for mild pain (headache)     ARIPiprazole (ABILIFY) 2 MG tablet Take 2 tablet (4 mg) by mouth daily     atenolol (TENORMIN) 25 MG tablet Take 1 tablet (25 mg) by mouth daily     buprenorphine HCl-naloxone HCl (SUBOXONE) 4-1 MG per film Place 1 Film under the tongue daily     buprenorphine HCl-naloxone HCl (SUBOXONE) 8-2 MG per film Use 0.5 film under the tongue every 8 hours for pain. Max 1.5 films per day. Fill and begin 4/6/2021.  10 day supply     chlorzoxazone (PARAFON FORTE) 500 MG tablet Take 1 tablet (500 mg) by mouth 3 times daily as needed for muscle spasms     cholecalciferol (VITAMIN D3) 125 mcg (5000 units) capsule Take 125 mcg by mouth daily     clonazePAM (KLONOPIN) 1 MG tablet TAKE ONE-HALF - 1 TABLET BY MOUTH ONCE DAILY AS NEEDED FOR ANXIETY     diclofenac (VOLTAREN) 1 % topical gel Apply 2 g topically 4 times daily     etanercept (ENBREL SURECLICK) 50 MG/ML autoinjector Inject 50 mg Subcutaneous once a week (Patient taking differently: Inject 50 mg Subcutaneous once a week Every Tuesday)     fish oil-omega-3 fatty acids 1000 MG capsule Take 1 g by mouth daily     FLUoxetine (PROZAC) 40 MG capsule Take 1 capsule (40 mg) by mouth daily     folic acid (FOLVITE) 1 MG tablet Take 5 tablets (5 mg) by mouth daily     Lidocaine (LIDOCARE) 4 % Patch Place 1-3 patches onto the skin every 24 hours To prevent lidocaine toxicity, patient should be patch free  "for 12 hrs daily.     lidocaine (XYLOCAINE) 5 % external ointment Apply topically every 4 hours as needed for moderate pain     liothyronine (CYTOMEL) 25 MCG tablet Take 1 tablet (25 mcg) by mouth daily     melatonin 3 MG tablet Take 1 tablet (3 mg) by mouth At Bedtime     menthol, Topical Analgesic, 2.5% (BENGAY VANISHIN SCENT) 2.5 % GEL topical gel Apply topically every 6 hours as needed for moderate pain     methotrexate sodium 2.5 MG TABS Take 4 tablets (10 mg) by mouth every 7 days (Patient taking differently: Take 4 tablets by mouth every 7 days Every tuesday)     multivitamin (CENTRUM SILVER) tablet Take 1 tablet by mouth daily     nicotine polacrilex (NICORETTE) 4 MG gum PLACE 1 PIECE INSIDE THE CHEEK AS NEEDED FOR SMOKING CESSATION     omeprazole (PRILOSEC) 20 MG DR capsule Take 20 mg by mouth daily      prazosin (MINIPRESS) 1 MG capsule Take 1 capsule (1 mg) by mouth At Bedtime     traZODone (DESYREL) 50 MG tablet Take 0.5 tablets (25 mg) by mouth At Bedtime . With additional 0.25 tablet (12.5mg) as needed daily for anxiety. (Patient taking differently: Take 50 mg by mouth At Bedtime )     No current facility-administered medications for this encounter.              Allergies:     Allergies   Allergen Reactions     Mirtazapine      Other reaction(s): Coma     Hydrocodone Itching     Ms Contin [Morphine] Itching     Remeron Soltab Other (See Comments)     \"Blacked out\" for one week            Psychiatric Examination:   There were no vitals taken for this visit.  Weight is 0 lbs 0 oz  There is no height or weight on file to calculate BMI.    Appearance:  awake, alert and adequately groomed  Attitude:  cooperative  Eye Contact:  good  Mood:   depressed  Affect:  mood congruent  Speech:  clear, coherent  Psychomotor Behavior:  no evidence of tardive dyskinesia, dystonia, or tics  Throught Process:  logical  Associations:  no loose associations  Thought Content:   Passive SI;  No plan or intent;  Contracts no " self harm;  No homicidal ideation and no evidence of psychotic thought  Insight:  good  Judgement:  intact  Oriented to:  time, person, and place  Attention Span and Concentration:  intact  Recent and Remote Memory:  intact  Gait:Normal    Risk/Potential for Dangerousness:  Multiple Active Diagnoses:HIGH  Self Care:HIGH  Suicide:LOW  Assault:LOW  Self Injurious Behaviors:LOW  Inappropriate Sexual Behavior:LOW         Labs:   No results found for this or any previous visit (from the past 24 hour(s)).     Recent Results (from the past 1008 hour(s))   Asymptomatic SARS-CoV-2 COVID-19 Virus (Coronavirus) by PCR    Collection Time: 03/16/21 12:47 PM    Specimen: Nasopharyngeal   Result Value Ref Range    SARS-CoV-2 Virus Specimen Source Nasopharyngeal     SARS-CoV-2 PCR Result NEGATIVE     SARS-CoV-2 PCR Comment (Note)    Drug abuse screen 77 urine (FL, RH, SH)    Collection Time: 03/16/21  1:23 PM   Result Value Ref Range    Amphetamine Qual Urine Negative NEG^Negative    Barbiturates Qual Urine Negative NEG^Negative    Benzodiazepine Qual Urine Negative NEG^Negative    Cannabinoids Qual Urine Negative NEG^Negative    Cocaine Qual Urine Negative NEG^Negative    Opiates Qualitative Urine Negative NEG^Negative    PCP Qual Urine Negative NEG^Negative   CBC with platelets differential    Collection Time: 03/17/21  7:48 AM   Result Value Ref Range    WBC 8.3 4.0 - 11.0 10e9/L    RBC Count 4.95 4.4 - 5.9 10e12/L    Hemoglobin 15.0 13.3 - 17.7 g/dL    Hematocrit 44.1 40.0 - 53.0 %    MCV 89 78 - 100 fl    MCH 30.3 26.5 - 33.0 pg    MCHC 34.0 31.5 - 36.5 g/dL    RDW 13.3 10.0 - 15.0 %    Platelet Count 211 150 - 450 10e9/L    Diff Method Automated Method     % Neutrophils 53.5 %    % Lymphocytes 33.0 %    % Monocytes 8.0 %    % Eosinophils 4.3 %    % Basophils 1.0 %    % Immature Granulocytes 0.2 %    Nucleated RBCs 0 0 /100    Absolute Neutrophil 4.4 1.6 - 8.3 10e9/L    Absolute Lymphocytes 2.7 0.8 - 5.3 10e9/L    Absolute  Monocytes 0.7 0.0 - 1.3 10e9/L    Absolute Eosinophils 0.4 0.0 - 0.7 10e9/L    Absolute Basophils 0.1 0.0 - 0.2 10e9/L    Abs Immature Granulocytes 0.0 0 - 0.4 10e9/L    Absolute Nucleated RBC 0.0    Comprehensive metabolic panel    Collection Time: 03/17/21  7:48 AM   Result Value Ref Range    Sodium 142 133 - 144 mmol/L    Potassium 4.1 3.4 - 5.3 mmol/L    Chloride 110 (H) 94 - 109 mmol/L    Carbon Dioxide 27 20 - 32 mmol/L    Anion Gap 5 3 - 14 mmol/L    Glucose 92 70 - 99 mg/dL    Urea Nitrogen 12 7 - 30 mg/dL    Creatinine 1.12 0.66 - 1.25 mg/dL    GFR Estimate 74 >60 mL/min/[1.73_m2]    GFR Estimate If Black 86 >60 mL/min/[1.73_m2]    Calcium 8.4 (L) 8.5 - 10.1 mg/dL    Bilirubin Total 0.6 0.2 - 1.3 mg/dL    Albumin 3.3 (L) 3.4 - 5.0 g/dL    Protein Total 6.7 (L) 6.8 - 8.8 g/dL    Alkaline Phosphatase 91 40 - 150 U/L    ALT 30 0 - 70 U/L    AST 17 0 - 45 U/L   TSH with free T4 reflex and/or T3 as indicated    Collection Time: 03/17/21  7:48 AM   Result Value Ref Range    TSH 2.01 0.40 - 4.00 mU/L   Folate    Collection Time: 03/17/21  7:48 AM   Result Value Ref Range    Folate 17.2 >5.4 ng/mL   Vitamin B12    Collection Time: 03/17/21  7:48 AM   Result Value Ref Range    Vitamin B12 466 193 - 986 pg/mL   Drug abuse screen 6 urine (tox)    Collection Time: 03/21/21  2:05 AM   Result Value Ref Range    Amphetamine Qual Urine Negative NEG^Negative    Barbiturates Qual Urine Negative NEG^Negative    Benzodiazepine Qual Urine Negative NEG^Negative    Cannabinoids Qual Urine Negative NEG^Negative    Cocaine Qual Urine Negative NEG^Negative    Ethanol Qual Urine Negative NEG^Negative    Opiates Qualitative Urine Negative NEG^Negative   Asymptomatic Influenza A/B & SARS-CoV2 (COVID-19) Virus PCR Multiplex    Collection Time: 03/21/21  6:34 AM    Specimen: Nasopharyngeal   Result Value Ref Range    Flu A/B & SARS-COV-2 PCR Source Nasopharyngeal     SARS-CoV-2 PCR Result NEGATIVE     Influenza A PCR Negative  NEG^Negative    Influenza B PCR Negative NEG^Negative    Respiratory Syncytial Virus PCR (Note)     Flu A/B & SARS-CoV-2 PCR Comment (Note)    CBC with platelets differential    Collection Time: 03/21/21  8:08 AM   Result Value Ref Range    WBC 6.8 4.0 - 11.0 10e9/L    RBC Count 5.18 4.4 - 5.9 10e12/L    Hemoglobin 15.6 13.3 - 17.7 g/dL    Hematocrit 45.7 40.0 - 53.0 %    MCV 88 78 - 100 fl    MCH 30.1 26.5 - 33.0 pg    MCHC 34.1 31.5 - 36.5 g/dL    RDW 13.1 10.0 - 15.0 %    Platelet Count 246 150 - 450 10e9/L    Diff Method Automated Method     % Neutrophils 53.7 %    % Lymphocytes 31.9 %    % Monocytes 8.9 %    % Eosinophils 4.2 %    % Basophils 1.0 %    % Immature Granulocytes 0.3 %    Nucleated RBCs 0 0 /100    Absolute Neutrophil 3.7 1.6 - 8.3 10e9/L    Absolute Lymphocytes 2.2 0.8 - 5.3 10e9/L    Absolute Monocytes 0.6 0.0 - 1.3 10e9/L    Absolute Eosinophils 0.3 0.0 - 0.7 10e9/L    Absolute Basophils 0.1 0.0 - 0.2 10e9/L    Abs Immature Granulocytes 0.0 0 - 0.4 10e9/L    Absolute Nucleated RBC 0.0    Comprehensive metabolic panel    Collection Time: 03/21/21  8:08 AM   Result Value Ref Range    Sodium 141 133 - 144 mmol/L    Potassium 4.0 3.4 - 5.3 mmol/L    Chloride 108 94 - 109 mmol/L    Carbon Dioxide 29 20 - 32 mmol/L    Anion Gap 4 3 - 14 mmol/L    Glucose 95 70 - 99 mg/dL    Urea Nitrogen 12 7 - 30 mg/dL    Creatinine 0.92 0.66 - 1.25 mg/dL    GFR Estimate >90 >60 mL/min/[1.73_m2]    GFR Estimate If Black >90 >60 mL/min/[1.73_m2]    Calcium 8.3 (L) 8.5 - 10.1 mg/dL    Bilirubin Total 0.7 0.2 - 1.3 mg/dL    Albumin 3.5 3.4 - 5.0 g/dL    Protein Total 7.2 6.8 - 8.8 g/dL    Alkaline Phosphatase 100 40 - 150 U/L    ALT 34 0 - 70 U/L    AST 20 0 - 45 U/L   TSH with free T4 reflex    Collection Time: 03/21/21  8:08 AM   Result Value Ref Range    TSH 2.39 0.40 - 4.00 mU/L         Impression:   This is a 55 year old male continues PHP for mood stabilization.  Mood is depressed.  Frustrated with lack of pain  control.           DIagnoses:     Axis I:  Major depressive disorder, recurrent.  Generalized anxiety disorder.  Panic disorder.  Alcohol use disorder, in remission.   Axis II:  Deferred.   Axis III:  Chronic pain.          Plan:     Continue [facility].   Increase Abilify from 2mg to 4 mg/d and continue Prozac 40mg, which were recently started during his partial stays.   Expect stabilization and completion of Partial Hospital Program.   We will reevaluate psychotropic medications during his partial hospital stay.   Follow-up with Dr. Sage and therapist at the [facility].     Jw Adkins MD

## 2021-04-08 ENCOUNTER — MYC MEDICAL ADVICE (OUTPATIENT)
Dept: PALLIATIVE MEDICINE | Facility: CLINIC | Age: 56
End: 2021-04-08

## 2021-04-08 ENCOUNTER — ANCILLARY PROCEDURE (OUTPATIENT)
Dept: MRI IMAGING | Facility: CLINIC | Age: 56
End: 2021-04-08
Attending: NURSE PRACTITIONER
Payer: COMMERCIAL

## 2021-04-08 DIAGNOSIS — F11.20 UNCOMPLICATED OPIOID DEPENDENCE (H): ICD-10-CM

## 2021-04-08 DIAGNOSIS — G89.29 CHRONIC BILATERAL LOW BACK PAIN WITH RIGHT-SIDED SCIATICA: ICD-10-CM

## 2021-04-08 DIAGNOSIS — M25.551 HIP PAIN, RIGHT: Primary | ICD-10-CM

## 2021-04-08 DIAGNOSIS — M54.41 CHRONIC BILATERAL LOW BACK PAIN WITH RIGHT-SIDED SCIATICA: ICD-10-CM

## 2021-04-08 DIAGNOSIS — M54.59 LUMBAR FACET JOINT PAIN: ICD-10-CM

## 2021-04-08 DIAGNOSIS — M45.7 ANKYLOSING SPONDYLITIS OF LUMBOSACRAL REGION (H): ICD-10-CM

## 2021-04-08 DIAGNOSIS — M51.369 DDD (DEGENERATIVE DISC DISEASE), LUMBAR: ICD-10-CM

## 2021-04-08 DIAGNOSIS — M05.79 RHEUMATOID ARTHRITIS INVOLVING MULTIPLE SITES WITH POSITIVE RHEUMATOID FACTOR (H): ICD-10-CM

## 2021-04-08 DIAGNOSIS — M54.16 LUMBAR RADICULOPATHY: ICD-10-CM

## 2021-04-08 PROCEDURE — 72148 MRI LUMBAR SPINE W/O DYE: CPT | Mod: TC | Performed by: RADIOLOGY

## 2021-04-08 NOTE — GROUP NOTE
Psychoeducation Group Note    PATIENT'S NAME: Jailene Breen  MRN:   [id number]  :   1965  ACCT. NUMBER: [id number]  DATE OF SERVICE: 21  START TIME: 11:00 AM  END TIME: 11:50 AM  FACILITATOR: Karen Mejias OTR/L  TOPIC: [facility]: Lifestyle Balance and Structure  [facility] Adult Partial Hospitalization Program  TRACK: 1    NUMBER OF PARTICIPANTS: 9    Telemedicine Visit: The patient's condition can be safely assessed and treated via synchronous audio and visual telemedicine encounter.      Reason for Telemedicine Visit: Services only offered telehealth    Originating Site (Patient Location): Patient's home    Distant Site (Provider Location): [facility] Outpatient Setting: Partial [facility]    Consent:  The patient/guardian has verbally consented to: the potential risks and benefits of telemedicine (video visit) versus in person care; bill my insurance or make self-payment for services provided; and responsibility for payment of non-covered services.     Mode of Communication:  Video Conference via Zoom    As the provider I attest to compliance with applicable laws and regulations related to telemedicine.     Summary of Group / Topics Discussed:  Lifestyle Balance and Structure:  Leisure Values: Provided psychoeducation and discussion on benefits of leisure on stress management, parasympathetic NS activation, and wellness. Facilitated a structured self-reflective process where patients identified their leisure values: what is most important to them with regards to how they spend their time and energy on that promotes lifestyle balance to support mental wellbeing. Experiential process facilitated where patients reflected on past, present, and potential future leisure activities that fulfill their leisure values. Validation and support provided.    Patient Session Goals / Objectives:    [place] the mechanisms and benefits of leisure activity to create lifestyle balance and  improve mental health.     Identified their leisure values (how they want to spend their time and energy)    Identified past, present, and future leisure activities that demonstrate a connection to their leisure values    Identify first step to engaging in a new or old leisure activity again and problem solve barriers.         Patient Participation / Response:  Moderately participated, sharing some personal reflections / insights and adequately adequately received / provided feedback with other participants.    Patient presentation: constricted affect; generally quiet in group but appeared attentive to discussion and Patient would benefit from additional opportunities to practice the content to be able to generalize it to their everyday life with increased intentionality, consistency, and efficacy in support of their psychiatric recovery    Treatment Plan:  Patient has a current master individualized treatment plan.  See Epic treatment plan for more information.    Karen Mejias, OTR/L

## 2021-04-08 NOTE — GROUP NOTE
Psychoeducation Group Note    PATIENT'S NAME: Jailene Breen  MRN:   [id number]  :   1965  ACCT. NUMBER: [id number]  DATE OF SERVICE: 21  START TIME:  1:00 PM  END TIME:  1:50 PM  FACILITATOR: Karen Mejias OTR/L  TOPIC: MH PHP OT Group: Self- Regulation Skills  [facility] Adult Partial Hospitalization Program  TRACK: 1    NUMBER OF PARTICIPANTS: 9    Telemedicine Visit: The patient's condition can be safely assessed and treated via synchronous audio and visual telemedicine encounter.      Reason for Telemedicine Visit: Services only offered telehealth    Originating Site (Patient Location): Patient's home    Distant Site (Provider Location): [facility] Outpatient Setting: Partial [facility]    Consent:  The patient/guardian has verbally consented to: the potential risks and benefits of telemedicine (video visit) versus in person care; bill my insurance or make self-payment for services provided; and responsibility for payment of non-covered services.     Mode of Communication:  Video Conference via Zoom    As the provider I attest to compliance with applicable laws and regulations related to telemedicine.     Summary of Group / Topics Discussed:  Self-Regulation Skills: Sensory Grounding Skills:  Patients actively participated in a psychoeducational discussion focused on identifying and utilizing skills for grounding when experiencing dissociation, flashbacks, panic attacks, ruminations, and/or suicidal thoughts.  Patients also participated in a discussion focused on identifying skills that can be used for preventative purposes.  Patient's explored body based skills (bottom up processing skills) and techniques to help manage symptoms and ground themselves so they can be in control and comfortable and able to function in different environments.         Patient Session Goals / Objectives:    [place] how the ANS works and importance of its  impact on functioning and mental  wellbeing    [place] how developing interoceptive awareness can help one self-regulate sooner rather than later    Identified signs and symptoms when grounding skills could be helpful     Identified specific and individualized neurosensory skills to help when distressed and for crisis management    Established a plan for practice of these skills in their own environments       Patient Participation / Response:  Minimally participated, only when prompted / asked.    Patient presentation: quiet in group today and Patient would benefit from additional opportunities to practice the content to be able to generalize it to their everyday life with increased intentionality, consistency, and efficacy in support of their psychiatric recovery    Treatment Plan:  Patient has a current master individualized treatment plan.  See Epic treatment plan for more information.    Karen Mejias, OTR/L

## 2021-04-09 ENCOUNTER — HOSPITAL ENCOUNTER (OUTPATIENT)
Dept: BEHAVIORAL HEALTH | Facility: CLINIC | Age: 56
End: 2021-04-09
Attending: PSYCHIATRY & NEUROLOGY
Payer: COMMERCIAL

## 2021-04-09 DIAGNOSIS — F33.2 MAJOR DEPRESSIVE DISORDER, RECURRENT EPISODE, SEVERE WITH MIXED FEATURES (H): Primary | ICD-10-CM

## 2021-04-09 PROCEDURE — H0035 MH PARTIAL HOSP TX UNDER 24H: HCPCS | Mod: GT | Performed by: OCCUPATIONAL THERAPIST

## 2021-04-09 PROCEDURE — H0035 MH PARTIAL HOSP TX UNDER 24H: HCPCS | Mod: 95 | Performed by: SOCIAL WORKER

## 2021-04-09 PROCEDURE — H0035 MH PARTIAL HOSP TX UNDER 24H: HCPCS | Mod: GT | Performed by: COUNSELOR

## 2021-04-09 PROCEDURE — H0035 MH PARTIAL HOSP TX UNDER 24H: HCPCS | Mod: GT

## 2021-04-09 RX ORDER — ARIPIPRAZOLE 5 MG/1
5 TABLET ORAL DAILY
Qty: 30 TABLET | Refills: 0 | Status: SHIPPED | OUTPATIENT
Start: 2021-04-09 | End: 2021-05-28

## 2021-04-09 NOTE — GROUP NOTE
Psychoeducation Group Note    PATIENT'S NAME: Jailene Breen  MRN:   [id number]  :   1965  ACCT. NUMBER: [id number]  DATE OF SERVICE: 21  START TIME: 10:00 AM  END TIME: 10:50 AM  FACILITATOR: Devon Rubin OT  TOPIC: [facility] OT Group: Self- Regulation Skills  [facility] Adult Partial Hospitalization Program  TRACK: 1    Telemedicine Visit: The patient's condition can be safely assessed and treated via synchronous audio and visual telemedicine encounter.      Reason for Telemedicine Visit: Services only offered telehealth and Covid 19    Originating Site (Patient Location): Patient's home    Distant Site (Provider Location): [facility] Hospital: [facility] Building:     Consent:  The patient/guardian has verbally consented to: the potential risks and benefits of telemedicine (video visit) versus in person care; bill my insurance or make self-payment for services provided; and responsibility for payment of non-covered services.     Mode of Communication:  Video Conference via [employer]press    As the provider I attest to compliance with applicable laws and regulations related to telemedicine.    NUMBER OF PARTICIPANTS: 6    Summary of Group / Topics Discussed:  Self-Regulation Skills: Sensory Enhanced Mindfulness: Patients were provided with written and verbal psychoeducation on the concept of integrating mindfulness with a bottom up, sensory rich, experiential intervention activities to develop self-awareness and skills for self-regulation.  Emphasis placed on the benefits of mindfulness through bottom up (body based) activities and how they can help to emotionally ground oneself or provide a healthy distraction to self-regulate when distressed. Patients worked to increase knowledge and skills during a guided skilled structured sensory enhanced activity: active meditation practices: breathwork and Qi gong. Patient reflected on the impact of the experiential process on their  nervous system to deepen self awareness around preferences.     Patient Session Goals / Objectives:    Identified how using sensory enhanced mindfulness practices can be used for grounding, stress management, and self-regulation      Improved awareness of different types of sensory enhanced mindfulness activities that assist with healthy coping of stress and symptoms      Established a plan for practice of these skills in their own environments    Practiced and reflected on how to generalize taught skills to their everyday life        Patient Participation / Response:  Fully participated with the group by sharing personal reflections / insights and openly received / provided feedback with other participants.    Verbalized understanding of content and Patient would benefit from additional opportunities to practice the content to be able to generalize it to their everyday life with increased intentionality, consistency, and efficacy in support of their psychiatric recovery    Treatment Plan:  Patient has a current master individualized treatment plan.  See Epic treatment plan for more information.    Devon Rubin, OT

## 2021-04-09 NOTE — GROUP NOTE
Process Group Note    PATIENT'S NAME: Jailene Breen  MRN:   [id number]  :   1965  ACCT. NUMBER: [id number]  DATE OF SERVICE: 21  START TIME:  9:00 AM  END TIME:  9:50 AM  FACILITATOR: Yordy White [facility]; Vale Pepe [facility]  TOPIC:  Process Group    Diagnoses:  Axis I:  Major depressive disorder, recurrent.  Generalized anxiety disorder.  Panic disorder.  Alcohol use disorder, in remission.   Axis II:  Deferred.   Axis III:  Chronic pain.      [facility] Adult Partial Hospitalization Program  TRACK: PHP 1    NUMBER OF PARTICIPANTS: 6    Telemedicine Visit: The patient's condition can be safely assessed and treated via synchronous audio and visual telemedicine encounter.      Reason for Telemedicine Visit: Services only offered telehealth and due to COVID-19.    Originating Site (Patient Location): Patient's home    Distant Site (Provider Location): Provider Remote Setting    Consent:  The patient/guardian has verbally consented to: the potential risks and benefits of telemedicine (video visit) versus in person care; bill my insurance or make self-payment for services provided; and responsibility for payment of non-covered services.     Mode of Communication:  Video Conference via [employer]    As the provider I attest to compliance with applicable laws and regulations related to telemedicine.          Data:    Session content: At the start of this group, patients were invited to check in by identifying themselves, describing their current emotional status, and identifying issues to address in this group.   Area(s) of treatment focus addressed in this session included Symptom Management, Personal Safety and Community Resources/Discharge Planning.  Patient reported feeling no energy and not feeling like doing much. Patient discussed working toward getting through the day and being where he needs to be for his planned activities. Patient identified not knowing what skills they will  use to address their goal(s). Patient reported procrastination may be a barrier to working toward their goal(s) and/or addressing mental health symptoms. Patient reported no safety concerns and/or self-injurious behaviors. Patient reported no substance use. Patient reported they are taking their medications as prescribed. Patient reported feeling proud that they made it on to group. Patient discussed dealing with low energy and procrastination with the treatment group.     Therapeutic Interventions/Treatment Strategies:  Psychotherapist offered support, feedback and validation and reinforced use of skills. Treatment modalities used include Motivational Interviewing and Cognitive Behavioral Therapy. Interventions include Coping Skills: Assisted patient in identifying 1-2 healthy distraction skills to reduce overall distress, Symptoms Management: Promoted understanding of their diagnoses and how it impacts their functioning and Emotions Management:  Discussed barriers to emotional regulation.    Assessment:    Patient response:   Patient responded to session by accepting feedback, listening, focusing on goals and accepting support    Possible barriers to participation / learning include: severity of symptoms    Health Issues:   None reported       Substance Use Review:   Substance Use: No active concerns identified.    Mental Status/Behavioral Observations  Appearance:   Appropriate   Eye Contact:   Good   Psychomotor Behavior: Normal   Attitude:   Cooperative   Orientation:   All  Speech   Rate / Production: Normal    Volume:  Normal   Mood:    Depressed  Normal  Affect:    Appropriate  Subdued   Thought Content:   Clear and Safety denies any current safety concerns including suicidal ideation, self-harm, and homicidal ideation  Thought Form:  Coherent  Logical     Insight:    Good     Plan:     Safety Plan: No current safety concerns identified.  Recommended that patient call 911 or go to the local ED should there  be a change in any of these risk factors.     Barriers to treatment: None identified    Patient Contracts (see media tab):  None    Substance Use: Provided encouragement towards sobriety     Continue or Discharge: Patient will continue in Adult Partial Hospitalization Program (PHP)  as planned. Patient is likely to benefit from learning and using skills as they work toward the goals identified in their treatment plan.      Yordy White, [facility]  April 10, 2021

## 2021-04-09 NOTE — TELEPHONE ENCOUNTER
[employer] message from patient on 4/8 at 1228:    Hi looks like the mri results came in. Also I'm still in a lot of pain.  ------------    Message sent to pt:    Lopez Swift,     I am sorry to hear that you continue to struggle with a lot of pain. I see that Susana increased the dose of suboxone on 4/6. Have you been taking the Suboxone 8/2 mg routinely as recommended, 1/2 film every 8 hours? If so, have you received any relief from the increased dose? I will be sure to have Susana review your message and will get back to you as soon as possible with a response about the MRI results.     Take care,     Chrissie Milan, KERLINEN, RN-BC  Patient Care Supervisor  [facility] [place]

## 2021-04-09 NOTE — GROUP NOTE
Psychotherapy Group Note    PATIENT'S NAME: Jailene Breen  MRN:   [id number]  :   1965  ACCT. NUMBER: [id number]  DATE OF SERVICE: 21  START TIME:  1:00 PM  END TIME:  1:50 PM  FACILITATOR: Vale Pepe LPCC  TOPIC: MH EBP Group: Symptom Awareness  [facility] Adult Partial Hospitalization Program  TRACK: [facility]    NUMBER OF PARTICIPANTS: 5    Summary of Group / Topics Discussed:  Symptom Awareness: Mood Disorders: Patients received a general overview of mood disorders including depressive disorders, anxiety disorders, and bipolar disorders and how it relates to their current symptoms. The purpose is to promote understanding of their diagnoses and how it impacts their functioning. Patients reviewed their current awareness of symptoms and diagnoses. Patients received information regarding diagnoses, etiology, cultural, and environmental factors as well as impact on functioning.     Patient Session Goals / Objectives:    Discussed patient individual symptoms and experiences    Reviewed diagnostic criteria and etiology of diagnoses     Telemedicine Visit: The patient's condition can be safely assessed and treated via synchronous audio and visual telemedicine encounter.      Reason for Telemedicine Visit: Services only offered telehealth and due to COVID-19    Originating Site (Patient Location): Patient's home    Distant Site (Provider Location): Provider Remote Setting    Consent:  The patient/guardian has verbally consented to: the potential risks and benefits of telemedicine (video visit) versus in person care; bill my insurance or make self-payment for services provided; and responsibility for payment of non-covered services.     Mode of Communication:  Video Conference via Zoom    As the provider I attest to compliance with applicable laws and regulations related to telemedicine.        Patient Participation / Response:  Fully participated with the group by sharing personal reflections /  insights and openly received / provided feedback with other participants.    Demonstrated understanding of topics discussed through group discussion and participation and Demonstrated understanding of how information regarding symptoms can assist in management of symptoms    Treatment Plan:  Patient has a current master individualized treatment plan.  See Epic treatment plan for more information.    Vale Pepe, [facility]

## 2021-04-09 NOTE — GROUP NOTE
Psychoeducation Group Note    PATIENT'S NAME: Jailene Breen  MRN:   [id number]  :   1965  ACCT. NUMBER: [id number]  DATE OF SERVICE: 21  START TIME: 11:00 AM  END TIME: 11:50 AM  FACILITATOR: Karen Mejias OTR/L  TOPIC: [facility] OT Group: Lifestyle Balance and Structure  [facility] Adult Partial Hospitalization Program  TRACK: 1    NUMBER OF PARTICIPANTS: 6    Telemedicine Visit: The patient's condition can be safely assessed and treated via synchronous audio and visual telemedicine encounter.      Reason for Telemedicine Visit: Services only offered telehealth    Originating Site (Patient Location): Patient's home    Distant Site (Provider Location): [facility]    Consent:  The patient/guardian has verbally consented to: the potential risks and benefits of telemedicine (video visit) versus in person care; bill my insurance or make self-payment for services provided; and responsibility for payment of non-covered services.     Mode of Communication:  Video Conference via Zoom    As the provider I attest to compliance with applicable laws and regulations related to telemedicine.     Summary of Group / Topics Discussed:  Lifestyle Balance and Structure:  OT Goal-setting & integration: Weekend Wellness: The group focused on reflecting on the past week and identifying insights and positive experiences that they want to build upon further.  The group also focused on identifying needs and any concerns for the upcoming weekend and created a list of activities and tasks that they might engage in to help support themselves and add structure their weekend.  Facilitated the sharing of individual insights and plans with validation, support, and feedback provided.    Patient Session Goals / Objectives:    Reflected on insights learned and positive experiences over the last week to help with their recovery and wellbeing    Identified needs and concerns for  the upcoming weekend and identified ways to address these    Created a written list of possible ways to structure their weekend    Identified plan to support follow through on plans and reflection on progress made         Patient Participation / Response:  Fully participated with the group by sharing personal reflections / insights and openly received / provided feedback with other participants.    Patient presentation: blunted affect; energy level seemed low; reported plans for the weekend; main concern is ongoing pain -plans to use distraction to help cope, Verbalized understanding of content and Patient would benefit from additional opportunities to practice the content to be able to generalize it to their everyday life with increased intentionality, consistency, and efficacy in support of their psychiatric recovery    Treatment Plan:  Patient has a current master individualized treatment plan.  See Epic treatment plan for more information.    Karen Mejias, OTR/L

## 2021-04-09 NOTE — GROUP NOTE
Psychotherapy Group Note    PATIENT'S NAME: Jailene Breen  MRN:   [id number]  :   1965  ACCT. NUMBER: [id number]  DATE OF SERVICE: 21  START TIME:  2:00 PM  END TIME:  2:50 PM  FACILITATOR: Liliya Dumont LICSW  TOPIC: MH EBP Group: Self-Awareness  [facility] Adult Partial Hospitalization Program  TRACK: 1    NUMBER OF PARTICIPANTS: 5    Summary of Group / Topics Discussed:  Self-Awareness: Self-Compassion: Patients received overview of key concepts in developing self-compassion. Patients discussed mindfulness, self-kindness, and finding common humanity. Patients identified their current approach to problems in their lives and learned skills for increasing self-compassion. Patients identified ways they can put self-compassion skills into practice and problem solve barriers to application of skills.     Patient Session Goals / Objectives:    [place] components of self-compassion    Identify ways to practice self-compassion in daily life    Problem solve barriers to self-compassion practice    Telemedicine Visit: The patient's condition can be safely assessed and treated via synchronous audio and visual telemedicine encounter.          Reason for Telemedicine Visit: Services only offered telehealth and covid19        Originating Site (Patient Location): Patient's home        Distant Site (Provider Location): Provider Remote Setting        Consent:  The patient/guardian has verbally consented to: the potential risks and benefits of telemedicine (video visit) versus in person care; bill my insurance or make self-payment for services provided; and responsibility for payment of non-covered services.         Mode of Communication:  Video Conference via [employer]        As the provider I attest to compliance with applicable laws and regulations related to telemedicine.       Patient Participation / Response:  Fully participated with the group by sharing personal reflections / insights and openly  received / provided feedback with other participants.    Demonstrated understanding of topics discussed through group discussion and participation, Demonstrated understanding of values, strengths, and challenges to learn about themselves and Practiced skills in session    Treatment Plan:  Patient has a current master individualized treatment plan.  See Epic treatment plan for more information.    Liliya Dumont, WALLYSW

## 2021-04-10 NOTE — TELEPHONE ENCOUNTER
Hi Les-  Your lumbar MRI actually looks really good. You have some mild degenerative changes in the form of small disc bulges at multiple levels, but there is no disc herniation or spinal canal stenosis (narrowing of the spinal canal) or neural foraminal stenosis (narrowing where the spinal nerves exit). You have some mild arthritis in the facet joints. There is no issue seen that would explain the pain that radiates down your right leg found in the MRI of your low back.      When we met on 4/5/2021, you noted that you were having right groin/hip joint pain and you also had tenderness over both SI joints.      The SI joints can radiate pain to the groin in many people. IF you feel that the right groin pain is worse with weight-bearing, this is more likely hip joint pain, and I would recommend sending you to Sports Medicine (likely Dr. Valladares as you have a relationship with him) and have them evaluate the right hip pain.      If you feel that the low back and right leg pain is the worst, then I would recommend trying SI joint injections to see if this turns down pain that can be coming from the SI joints, and this could be causing pain radiating down your right leg.      Have you had any reduction in your pain at all from using Suboxone 8/2mg films using 0.5 (one-half) film every 8 hours on a scheduled basis?      I look forward to hearing your response.   Susana GRANT RN CNP, XUAN  [facility]  [place] location       I have sent the above [employer] message to the patient.     Susana GRANT RN CNP, XUAN  [facility] location

## 2021-04-12 ENCOUNTER — HOSPITAL ENCOUNTER (OUTPATIENT)
Dept: BEHAVIORAL HEALTH | Facility: CLINIC | Age: 56
End: 2021-04-12
Attending: PSYCHIATRY & NEUROLOGY
Payer: COMMERCIAL

## 2021-04-12 PROCEDURE — H0035 MH PARTIAL HOSP TX UNDER 24H: HCPCS | Mod: 95

## 2021-04-12 PROCEDURE — H0035 MH PARTIAL HOSP TX UNDER 24H: HCPCS | Mod: GT | Performed by: COUNSELOR

## 2021-04-12 NOTE — TELEPHONE ENCOUNTER
Per patient myChart message:  From: Jamison Breen      Created: 4/12/2021 9:42 AM      Yes        Referral prepped.      Routed to provider.    Cha RN-BSN  [facility]

## 2021-04-12 NOTE — PROGRESS NOTES
Acknowledgement of Current Treatment Plan       I have reviewed my treatment plan with my therapist / counselor on 04/12/21.   I agree with the plan as it is written in the electronic health record.    Name:      Signature:  Jailene Breen Unable to sign due to COVID-19.  RAMOS Chavarria on 4/12/2021 at 9:35 AM     Dr. Jw Adkins MD  Psychiatrist    Liliya Dumont MS, [facility], BC-DMT  Psychotherapist     Yordy White MA, LMFT RAMOS Chavarria on 4/12/2021 at 9:35 AM     Vale Pepe MA, [facility]  Psychotherapist

## 2021-04-12 NOTE — GROUP NOTE
Psychoeducation Group Note    PATIENT'S NAME: Jailene Breen  MRN:   [id number]  :   1965  ACCT. NUMBER: [id number]  DATE OF SERVICE: 21  START TIME:  9:00 AM  END TIME:  9:50 AM  FACILITATOR: Gris Joshua RN  TOPIC: ALTAF RN Group: Medication Education and Management  [facility] Adult Partial Hospitalization Program  TRACK: 1    NUMBER OF PARTICIPANTS: 8    Summary of Group / Topics Discussed:  Medication Educations and Management:  Medication Jeopardy: Patients provided education regarding medication safety, antidepressants, side effects, neuroleptics, expected medication outcomes, knowledge of diagnosis, symptoms, and symptom management through an engaging jeopardy-style format.     Patient Session Goals / Objectives:    ? Participated in team-based Jeopardy game  ? Identified strategies for safe use, handling, and disposal of medications  ? Discussed basic aspects of medication safety, side effects, adverse outcomes and contraindications  Telemedicine Visit: The patient's condition can be safely assessed and treated via synchronous audio and visual telemedicine encounter.      Reason for Telemedicine Visit: Services only offered telehealth    Originating Site (Patient Location): Patient's home    Distant Site (Provider Location): Provider Remote Setting    Consent:  The patient/guardian has verbally consented to: the potential risks and benefits of telemedicine (video visit) versus in person care; bill my insurance or make self-payment for services provided; and responsibility for payment of non-covered services.     Mode of Communication:  Video Conference via [employer]    As the provider I attest to compliance with applicable laws and regulations related to telemedicine.       Patient Participation / Response:  Fully participated with the group by sharing personal reflections / insights and openly received / provided feedback with other participants.     Demonstrated understanding of  topics discussed through group discussion and participation    Treatment Plan:  Patient has a current master individualized treatment plan.  See Epic treatment plan for more information.    Gris Joshua RN

## 2021-04-12 NOTE — TELEPHONE ENCOUNTER
Hi Les-     I have signed a referral for you to see Dr. Josias Valladares re: your right hip pain. You may call and make this appt if you so choose, or his office will contact you to schedule it.      How many films do you have remaining of the Suboxone films? Are you using half a film every 8 hours? Once I have this information, I can give you the correct instruction.      Thanks  Susana GRANT RN CNP, JAQUELINP  [facility] [place]    I have sent the above [email or web address] message to the patient.     Susana GRANT RN CNP, JAQUELINP  [facility] [place]

## 2021-04-12 NOTE — TELEPHONE ENCOUNTER
From: Jamison Breen      Created: 4/12/2021 2:28 PM        *-*-*This message has not been handled.*-*-*    I can't get up stairs right now because of the class but I am taking 1/2 film every 8hours.        Writer sent pt a mychart to send quantity of films he has left when he has a chance.    Will await correspondence     Josias Lopez, RN  Care Coordinator   [facility] [place]

## 2021-04-12 NOTE — GROUP NOTE
Psychotherapy Group Note    PATIENT'S NAME: Jailene Breen  MRN:   [id number]  :   1965  ACCT. NUMBER: [id number]  DATE OF SERVICE: 21  START TIME:  2:00 PM  END TIME:  2:50 PM  FACILITATOR: Yordy White LPCC; Vale Pepe LPCC  TOPIC: MH EBP Group: Relationship Skills  [facility] Adult Partial Hospitalization Program  TRACK: [facility]    NUMBER OF PARTICIPANTS: 9    Summary of Group / Topics Discussed:  Relationship Skills: Boundaries: Patients were provided with a general overview of interpersonal boundaries and how lack of boundaries relates to symptoms and functioning. The purpose is to help patients identify boundary issues and gain awareness and skills to work towards healthier interpersonal boundaries. Current awareness of healthy boundary characteristics and barriers to establishing healthy boundaries were discussed.    Patient Session Goals / Objectives:    Familiarized patients with the concept of interpersonal boundaries and their characteristics    Discussed and practiced strategies to promote healthier interpersonal boundaries    Identified boundary issues and identified plan to improve boundaries    Telemedicine Visit: The patient's condition can be safely assessed and treated via synchronous audio and visual telemedicine encounter.      Reason for Telemedicine Visit: Services only offered telehealth and due to COVID-19.    Originating Site (Patient Location): Patient's home    Distant Site (Provider Location): Provider Remote Setting    Consent:  The patient/guardian has verbally consented to: the potential risks and benefits of telemedicine (video visit) versus in person care; bill my insurance or make self-payment for services provided; and responsibility for payment of non-covered services.     Mode of Communication:  Video Conference via Zoom.    As the provider I attest to compliance with applicable laws and regulations related to telemedicine.    Patient Participation  / Response:  Fully participated with the group by sharing personal reflections / insights and openly received / provided feedback with other participants.    Demonstrated understanding of topics discussed through group discussion and participation    Treatment Plan:  Patient has a current master individualized treatment plan and today was our weekly review of the patient's progress.  See Epic treatment plan for progress / updates on goals and plan.    Yordy White, [facility]

## 2021-04-12 NOTE — PROGRESS NOTES
[facility]   Dr. Adkins's Psychiatric Progress Note  2021      Patient:  Jailene Breen   Medical Record Number:  [id number]  :  1965/  :    Telemedicine Visit: The patient's condition can be safely assessed and treated via synchronous audio and visual telemedicine encounter.       Reason for Telemedicine Visit: COVID-19 lockdown     Originating Site (Patient Location):  HOME     Distant Site (Provider Location): [facility] Hospital: [place]     Consent:  The patient/guardian has verbally consented to: the potential risks and benefits of telemedicine (video visit) versus in person care; bill my insurance or make self-payment for services provided; and responsibility for payment of non-covered services.        Interim History:   The patient's care was discussed with the treatment team and chart notes were reviewed.    21:  Pt sees rheum this morning.  Appt was ok.  Needs to find another rheum doc as she's leaving.  Pt has low Vit D.  He'll start high dose Vit D.  Dealing with financial issues.  Weekend plans: get outside and shoot photos.      21:  Weekend was ok.  He got outside with in-laws and the dogs and went walking.  Mood over the weekend was blah blah.  It's a little bit better than a week ago.  Mood is same with structured time than not.  In group he gets anxious when it's his turn to talk.  This weekend was the best he's felt in a long time.      21:  Pt could be doing better.  Not doing well.  Took prn Klonopin yesterday for anxiety.  On current Abilify and Prozac x 2 weeks.  Has some benefits.  Pain is bad.  Mad that suboxone is not helping.  Sees that doc in a couple weeks.     21:  Groups are ok.  Depression is same.  Weekend: go to in-laws to celebrate Easter and wife's birthday.      21: Weekend  Was ok.  Went by fast.  Sat. Was wife's birthday and they went to her parents for a meal and then went home and watched movies.   Enjoyed that.      Psychiatric ROS:  Mood:  Less depressed;  Still anxious  Sleep:  Can't fall asleep and once does wakes after 2 hours;  Takes naps  Appetite:  normal  Eating:  normal  Energy Level:  low  Concentration/Memory Problems:  NO  Suicidal Thoughts:  No  Homicidal Thoughts:No  Psychotic Symptoms: No  Medication Side Effects:No  Medication Compliance:Yes   Physical Complaints:  positive for pain all over; worst in hip, lower back and knees;  suboxone does not help;  Saw pain doc yesterday.  He's frustrated with suboxone, which doesn't help even after doc doubled the dose.  Considering medical marijuana.           Medications:     PAST PSYCH MEDS:  Prozac, Lexapro, nortriptyline, Remeron, Abilify, trazodone, melatonin, Klonopin    Current Outpatient Medications   Medication Sig     acetaminophen (TYLENOL) 500 MG tablet Take 1,000 mg by mouth every 6 hours as needed for mild pain (headache)     ARIPiprazole (ABILIFY) 2 MG tablet Take 2 tablet (4 mg) by mouth daily     atenolol (TENORMIN) 25 MG tablet Take 1 tablet (25 mg) by mouth daily     buprenorphine HCl-naloxone HCl (SUBOXONE) 4-1 MG per film Place 1 Film under the tongue daily     buprenorphine HCl-naloxone HCl (SUBOXONE) 8-2 MG per film Use 0.5 film under the tongue every 8 hours for pain. Max 1.5 films per day. Fill and begin 4/6/2021.  10 day supply     chlorzoxazone (PARAFON FORTE) 500 MG tablet Take 1 tablet (500 mg) by mouth 3 times daily as needed for muscle spasms     cholecalciferol (VITAMIN D3) 125 mcg (5000 units) capsule Take 125 mcg by mouth daily     clonazePAM (KLONOPIN) 1 MG tablet TAKE ONE-HALF - 1 TABLET BY MOUTH ONCE DAILY AS NEEDED FOR ANXIETY     diclofenac (VOLTAREN) 1 % topical gel Apply 2 g topically 4 times daily     etanercept (ENBREL SURECLICK) 50 MG/ML autoinjector Inject 50 mg Subcutaneous once a week (Patient taking differently: Inject 50 mg Subcutaneous once a week Every Tuesday)     fish oil-omega-3 fatty acids 1000 MG  "capsule Take 1 g by mouth daily     FLUoxetine (PROZAC) 40 MG capsule Take 1 capsule (40 mg) by mouth daily     folic acid (FOLVITE) 1 MG tablet Take 5 tablets (5 mg) by mouth daily     Lidocaine (LIDOCARE) 4 % Patch Place 1-3 patches onto the skin every 24 hours To prevent lidocaine toxicity, patient should be patch free for 12 hrs daily.     lidocaine (XYLOCAINE) 5 % external ointment Apply topically every 4 hours as needed for moderate pain     liothyronine (CYTOMEL) 25 MCG tablet Take 1 tablet (25 mcg) by mouth daily     melatonin 3 MG tablet Take 1 tablet (3 mg) by mouth At Bedtime     menthol, Topical Analgesic, 2.5% (BENGAY VANISHIN SCENT) 2.5 % GEL topical gel Apply topically every 6 hours as needed for moderate pain     methotrexate sodium 2.5 MG TABS Take 4 tablets (10 mg) by mouth every 7 days (Patient taking differently: Take 4 tablets by mouth every 7 days Every tuesday)     multivitamin (CENTRUM SILVER) tablet Take 1 tablet by mouth daily     nicotine polacrilex (NICORETTE) 4 MG gum PLACE 1 PIECE INSIDE THE CHEEK AS NEEDED FOR SMOKING CESSATION     omeprazole (PRILOSEC) 20 MG DR capsule Take 20 mg by mouth daily      prazosin (MINIPRESS) 1 MG capsule Take 1 capsule (1 mg) by mouth At Bedtime     traZODone (DESYREL) 50 MG tablet Take 50 to 100mg at bedtime for sleep     No current facility-administered medications for this encounter.              Allergies:     Allergies   Allergen Reactions     Mirtazapine      Other reaction(s): Coma     Hydrocodone Itching     Ms Contin [Morphine] Itching     Remeron Soltab Other (See Comments)     \"Blacked out\" for one week            Psychiatric Examination:   There were no vitals taken for this visit.  Weight is 0 lbs 0 oz  There is no height or weight on file to calculate BMI.    Appearance:  awake, alert and adequately groomed  Attitude:  cooperative  Eye Contact:  good  Mood:   Less depressed  Affect:  mood congruent  Speech:  clear, coherent  Psychomotor " Behavior:  no evidence of tardive dyskinesia, dystonia, or tics  Throught Process:  logical  Associations:  no loose associations  Thought Content:   Passive SI;  No plan or intent;  Contracts no self harm;  No homicidal ideation and no evidence of psychotic thought  Insight:  good  Judgement:  intact  Oriented to:  time, person, and place  Attention Span and Concentration:  intact  Recent and Remote Memory:  intact  Gait:Normal    Risk/Potential for Dangerousness:  Multiple Active Diagnoses:HIGH  Self Care:HIGH  Suicide:LOW  Assault:LOW  Self Injurious Behaviors:LOW  Inappropriate Sexual Behavior:LOW         Labs:   No results found for this or any previous visit (from the past 24 hour(s)).     Recent Results (from the past 1008 hour(s))   Asymptomatic SARS-CoV-2 COVID-19 Virus (Coronavirus) by PCR    Collection Time: 03/16/21 12:47 PM    Specimen: Nasopharyngeal   Result Value Ref Range    SARS-CoV-2 Virus Specimen Source Nasopharyngeal     SARS-CoV-2 PCR Result NEGATIVE     SARS-CoV-2 PCR Comment (Note)    Drug abuse screen 77 urine (FL, RH, SH)    Collection Time: 03/16/21  1:23 PM   Result Value Ref Range    Amphetamine Qual Urine Negative NEG^Negative    Barbiturates Qual Urine Negative NEG^Negative    Benzodiazepine Qual Urine Negative NEG^Negative    Cannabinoids Qual Urine Negative NEG^Negative    Cocaine Qual Urine Negative NEG^Negative    Opiates Qualitative Urine Negative NEG^Negative    PCP Qual Urine Negative NEG^Negative   CBC with platelets differential    Collection Time: 03/17/21  7:48 AM   Result Value Ref Range    WBC 8.3 4.0 - 11.0 10e9/L    RBC Count 4.95 4.4 - 5.9 10e12/L    Hemoglobin 15.0 13.3 - 17.7 g/dL    Hematocrit 44.1 40.0 - 53.0 %    MCV 89 78 - 100 fl    MCH 30.3 26.5 - 33.0 pg    MCHC 34.0 31.5 - 36.5 g/dL    RDW 13.3 10.0 - 15.0 %    Platelet Count 211 150 - 450 10e9/L    Diff Method Automated Method     % Neutrophils 53.5 %    % Lymphocytes 33.0 %    % Monocytes 8.0 %    %  Eosinophils 4.3 %    % Basophils 1.0 %    % Immature Granulocytes 0.2 %    Nucleated RBCs 0 0 /100    Absolute Neutrophil 4.4 1.6 - 8.3 10e9/L    Absolute Lymphocytes 2.7 0.8 - 5.3 10e9/L    Absolute Monocytes 0.7 0.0 - 1.3 10e9/L    Absolute Eosinophils 0.4 0.0 - 0.7 10e9/L    Absolute Basophils 0.1 0.0 - 0.2 10e9/L    Abs Immature Granulocytes 0.0 0 - 0.4 10e9/L    Absolute Nucleated RBC 0.0    Comprehensive metabolic panel    Collection Time: 03/17/21  7:48 AM   Result Value Ref Range    Sodium 142 133 - 144 mmol/L    Potassium 4.1 3.4 - 5.3 mmol/L    Chloride 110 (H) 94 - 109 mmol/L    Carbon Dioxide 27 20 - 32 mmol/L    Anion Gap 5 3 - 14 mmol/L    Glucose 92 70 - 99 mg/dL    Urea Nitrogen 12 7 - 30 mg/dL    Creatinine 1.12 0.66 - 1.25 mg/dL    GFR Estimate 74 >60 mL/min/[1.73_m2]    GFR Estimate If Black 86 >60 mL/min/[1.73_m2]    Calcium 8.4 (L) 8.5 - 10.1 mg/dL    Bilirubin Total 0.6 0.2 - 1.3 mg/dL    Albumin 3.3 (L) 3.4 - 5.0 g/dL    Protein Total 6.7 (L) 6.8 - 8.8 g/dL    Alkaline Phosphatase 91 40 - 150 U/L    ALT 30 0 - 70 U/L    AST 17 0 - 45 U/L   TSH with free T4 reflex and/or T3 as indicated    Collection Time: 03/17/21  7:48 AM   Result Value Ref Range    TSH 2.01 0.40 - 4.00 mU/L   Folate    Collection Time: 03/17/21  7:48 AM   Result Value Ref Range    Folate 17.2 >5.4 ng/mL   Vitamin B12    Collection Time: 03/17/21  7:48 AM   Result Value Ref Range    Vitamin B12 466 193 - 986 pg/mL   Drug abuse screen 6 urine (tox)    Collection Time: 03/21/21  2:05 AM   Result Value Ref Range    Amphetamine Qual Urine Negative NEG^Negative    Barbiturates Qual Urine Negative NEG^Negative    Benzodiazepine Qual Urine Negative NEG^Negative    Cannabinoids Qual Urine Negative NEG^Negative    Cocaine Qual Urine Negative NEG^Negative    Ethanol Qual Urine Negative NEG^Negative    Opiates Qualitative Urine Negative NEG^Negative   Asymptomatic Influenza A/B & SARS-CoV2 (COVID-19) Virus PCR Multiplex    Collection  Time: 03/21/21  6:34 AM    Specimen: Nasopharyngeal   Result Value Ref Range    Flu A/B & SARS-COV-2 PCR Source Nasopharyngeal     SARS-CoV-2 PCR Result NEGATIVE     Influenza A PCR Negative NEG^Negative    Influenza B PCR Negative NEG^Negative    Respiratory Syncytial Virus PCR (Note)     Flu A/B & SARS-CoV-2 PCR Comment (Note)    CBC with platelets differential    Collection Time: 03/21/21  8:08 AM   Result Value Ref Range    WBC 6.8 4.0 - 11.0 10e9/L    RBC Count 5.18 4.4 - 5.9 10e12/L    Hemoglobin 15.6 13.3 - 17.7 g/dL    Hematocrit 45.7 40.0 - 53.0 %    MCV 88 78 - 100 fl    MCH 30.1 26.5 - 33.0 pg    MCHC 34.1 31.5 - 36.5 g/dL    RDW 13.1 10.0 - 15.0 %    Platelet Count 246 150 - 450 10e9/L    Diff Method Automated Method     % Neutrophils 53.7 %    % Lymphocytes 31.9 %    % Monocytes 8.9 %    % Eosinophils 4.2 %    % Basophils 1.0 %    % Immature Granulocytes 0.3 %    Nucleated RBCs 0 0 /100    Absolute Neutrophil 3.7 1.6 - 8.3 10e9/L    Absolute Lymphocytes 2.2 0.8 - 5.3 10e9/L    Absolute Monocytes 0.6 0.0 - 1.3 10e9/L    Absolute Eosinophils 0.3 0.0 - 0.7 10e9/L    Absolute Basophils 0.1 0.0 - 0.2 10e9/L    Abs Immature Granulocytes 0.0 0 - 0.4 10e9/L    Absolute Nucleated RBC 0.0    Comprehensive metabolic panel    Collection Time: 03/21/21  8:08 AM   Result Value Ref Range    Sodium 141 133 - 144 mmol/L    Potassium 4.0 3.4 - 5.3 mmol/L    Chloride 108 94 - 109 mmol/L    Carbon Dioxide 29 20 - 32 mmol/L    Anion Gap 4 3 - 14 mmol/L    Glucose 95 70 - 99 mg/dL    Urea Nitrogen 12 7 - 30 mg/dL    Creatinine 0.92 0.66 - 1.25 mg/dL    GFR Estimate >90 >60 mL/min/[1.73_m2]    GFR Estimate If Black >90 >60 mL/min/[1.73_m2]    Calcium 8.3 (L) 8.5 - 10.1 mg/dL    Bilirubin Total 0.7 0.2 - 1.3 mg/dL    Albumin 3.5 3.4 - 5.0 g/dL    Protein Total 7.2 6.8 - 8.8 g/dL    Alkaline Phosphatase 100 40 - 150 U/L    ALT 34 0 - 70 U/L    AST 20 0 - 45 U/L   TSH with free T4 reflex    Collection Time: 03/21/21  8:08 AM    Result Value Ref Range    TSH 2.39 0.40 - 4.00 mU/L         Impression:   This is a 55 year old male continues PHP for mood stabilization.  Mood is a little less depressed.             DIagnoses:     Axis I:  Major depressive disorder, recurrent.  Generalized anxiety disorder.  Panic disorder.  Alcohol use disorder, in remission.   Axis II:  Deferred.   Axis III:  Chronic pain.          Plan:     Continue [facility], [facility].  Ends on 4/15/21.   Increased Abilify from 2mg to 4 mg to 5mg/d and continue Prozac 40mg, which were recently started during his partial stays.   Increase Trazodone to 50 to 100mg at bedtime prn.   Expect stabilization and completion of Partial Hospital Program.   We will reevaluate psychotropic medications during his partial hospital stay.   Follow-up with Dr. Sage and therapist at the [facility].     Jw Adkins MD

## 2021-04-12 NOTE — TELEPHONE ENCOUNTER
Per patient myChart message:  From: Jamison Breen      Created: 4/9/2021 10:56 PM      It's not so much the pain going down my leg vs the pain in low back hip area, that's the worst,  and I'm still not getting any relief from that suboxone.      _____________    Mychart sent to pt:  From: Cha Molina RN      Created: 4/12/2021 9:29 AM      Devante Swift,  So per your response and JUANITA Ramon CNP's recommendation it looks like a referral to Dr. Valladares would be the next step. Do you want a referral to see him?  JUANITA Ramon CNP is off on Mondays.  She can review your update on the suboxone when she is back.         Routed to provider.     SANDRA Eubanks-BSN  [facility]

## 2021-04-12 NOTE — GROUP NOTE
Process Group Note    PATIENT'S NAME: Jailene Breen  MRN:   [id number]  :   1965  ACCT. NUMBER: [id number]  DATE OF SERVICE: 21  START TIME: 10:00 AM  END TIME: 10:50 AM  FACILITATOR: Yordy White [facility]; [facility]  TOPIC:  Process Group    Diagnoses:  Axis I:  Major depressive disorder, recurrent.  Generalized anxiety disorder.  Panic disorder.  Alcohol use disorder, in remission.   Axis II:  Deferred.   Axis III:  Chronic pain.      [facility] Adult Partial Hospitalization Program  TRACK: [facility]    NUMBER OF PARTICIPANTS: 8    Telemedicine Visit: The patient's condition can be safely assessed and treated via synchronous audio and visual telemedicine encounter.      Reason for Telemedicine Visit: Services only offered telehealth and due to COVID-19.    Originating Site (Patient Location): Patient's home    Distant Site (Provider Location): Provider Remote Setting    Consent:  The patient/guardian has verbally consented to: the potential risks and benefits of telemedicine (video visit) versus in person care; bill my insurance or make self-payment for services provided; and responsibility for payment of non-covered services.     Mode of Communication:  Video Conference via Software Technology    As the provider I attest to compliance with applicable laws and regulations related to telemedicine.            Data:    Session content: At the start of this group, patients were invited to check in by identifying themselves, describing their current emotional status, and identifying issues to address in this group.   Area(s) of treatment focus addressed in this session included Symptom Management, Personal Safety and Community Resources/Discharge Planning.  Patient reported feeling tired and unable to sleep. Patient discussed working toward making it to everything he is supposed to make it to today. Patient identified motivation as skills they will use to address their goal(s). Patient  reported being tired may be a barrier to working toward their goal(s) and/or addressing mental health symptoms. Patient reported having some suicidal thinking over the weekend but stated he is able to use his safety plan to stay safe. Patient reported no substance use. Patient reported they are taking their medications as prescribed. Patient reported feeling proud that they made it to group. Patient discussed feeling worthless, like an unwanted child to his medical providers because of his pain management problems, with the treatment group.     Therapeutic Interventions/Treatment Strategies:  Psychotherapist offered support, feedback and validation and reinforced use of skills. Treatment modalities used include Motivational Interviewing and Cognitive Behavioral Therapy. Interventions include Coping Skills: Assisted patient in identifying 1-2 healthy distraction skills to reduce overall distress, Symptoms Management: Promoted understanding of their diagnoses and how it impacts their functioning and Emotions Management:  Discussed barriers to emotional regulation.    Assessment:    Patient response:   Patient responded to session by accepting feedback, giving feedback, listening, focusing on goals, being attentive and accepting support    Possible barriers to participation / learning include: physical discomfort from pain and severity of symptoms    Health Issues:   Yes: pain management problems       Substance Use Review:   Substance Use: No active concerns identified.    Mental Status/Behavioral Observations  Appearance:   Appropriate   Eye Contact:   Good   Psychomotor Behavior: Normal   Attitude:   Cooperative   Orientation:   All  Speech   Rate / Production: Normal    Volume:  Normal   Mood:    Depressed  Normal Sad   Affect:    Appropriate  Subdued   Thought Content:   Clear and Safety Safety concerns have increased  Thought Form:  Coherent  Logical     Insight:    Good     Plan:     Safety Plan: Recommended  that patient call 911 or go to the local ED should there be a change in any of these risk factors.    Committed to safety and agreed to follow previously developed safety coping plan.      Barriers to treatment: ongoing difficulties with pain management    Patient Contracts (see media tab):  None    Substance Use: Provided encouragement towards sobriety     Continue or Discharge: Patient will continue in Adult Partial Hospitalization Program (PHP)  as planned. Patient is likely to benefit from learning and using skills as they work toward the goals identified in their treatment plan.      Yordy White, [facility]  April 13, 2021

## 2021-04-13 ENCOUNTER — HOSPITAL ENCOUNTER (OUTPATIENT)
Dept: BEHAVIORAL HEALTH | Facility: CLINIC | Age: 56
End: 2021-04-13
Attending: PSYCHIATRY & NEUROLOGY
Payer: COMMERCIAL

## 2021-04-13 PROCEDURE — H0035 MH PARTIAL HOSP TX UNDER 24H: HCPCS | Mod: 95

## 2021-04-13 PROCEDURE — H0035 MH PARTIAL HOSP TX UNDER 24H: HCPCS | Mod: GT

## 2021-04-13 PROCEDURE — H0035 MH PARTIAL HOSP TX UNDER 24H: HCPCS | Mod: GT | Performed by: COUNSELOR

## 2021-04-13 PROCEDURE — H0035 MH PARTIAL HOSP TX UNDER 24H: HCPCS | Mod: 95 | Performed by: COUNSELOR

## 2021-04-13 NOTE — GROUP NOTE
Psychoeducation Group Note    PATIENT'S NAME: Jailene Breen  MRN:   [id number]  :   1965  ACCT. NUMBER: [id number]  DATE OF SERVICE: 21  START TIME: 11:00 AM  END TIME: 11:50 AM  FACILITATOR: Devon Rubin OT  TOPIC: [facility] OT Group: Partial Hospitalization Program- Occupational Therapy  [facility] Adult Partial Hospitalization [facility]  TRACK: 1      Telemedicine Visit: The patient's condition can be safely assessed and treated via synchronous audio and visual telemedicine encounter.      Reason for Telemedicine Visit: Services only offered telehealth and covid 19    Originating Site (Patient Location): Patient's home    Distant Site (Provider Location): [facility] Hospital: [facility] Building: Outpatient Mental Health and Addiction Services    Consent:  The patient/guardian has verbally consented to: the potential risks and benefits of telemedicine (video visit) versus in person care; bill my insurance or make self-payment for services provided; and responsibility for payment of non-covered services.     Mode of Communication:  Video Conference via Medical Zoom    As the provider I attest to compliance with applicable laws and regulations related to telemedicine.      NUMBER OF PARTICIPANTS: 9    Summary of Group / Topics Discussed:  Resiliency Development: Honoring Energy . Group members were provided with a skilled Occupational Therapy group intervention process focused on exploring strategies and individualized adaptations for managing their energy to support improved mental wellbeing. They were provided with psychoeducation on and explored the concept of energy management through the lens of self-compassion by embracing the complementary energies of Yin & Yang (leaning into/yielding to, doing/being, sympathetic/parasympathetic, etc) to embrace a rhythm of recovery (Toni 2019). They were introduced to two potential strategies to help them honor and manage their energy  "including \"spoons\" and \"bank account\".They worked to improve self-awareness and develop a sense of agency through identification of specific meaningful and purposeful activities based on their own contextual energy level. Facilitation of reflection, validation, and support provided.          Patient Session Goals / Objectives:     Learn and develop understanding of ways to manage energy that embraces self-compassion.     Develop an understanding and practice of energy management through complementary energies to develop resiliency.     Deepen self-awareness about their own daily energy rhythms and apply taught concepts to their own plan for energy management strategies as they begin to re-engage in meaningful purposeful activities.     Identify one way to integrate content into their daily life to support recovery by identifying and honoring their energy level.                Patient Participation / Response:  Moderately participated, sharing some personal reflections / insights and adequately adequately received / provided feedback with other participants.    Verbalized understanding of content and Patient would benefit from additional opportunities to practice the content to be able to generalize it to their everyday life with increased intentionality, consistency, and efficacy in support of their psychiatric recovery    Treatment Plan:  Patient has a current master individualized treatment plan.  See Epic treatment plan for more information.    Devon Rubin, OT  "

## 2021-04-13 NOTE — ADDENDUM NOTE
Encounter addended by: Yordy White, [facility] on: 4/13/2021 10:33 AM   Actions taken: Flowsheet accepted

## 2021-04-13 NOTE — GROUP NOTE
Psychotherapy Group Note    PATIENT'S NAME: Jailene Breen  MRN:   [id number]  :   1965  ACCT. NUMBER: [id number]  DATE OF SERVICE: 21  START TIME:  4:00 PM  END TIME:  5:50 PM  FACILITATOR: Vale Pepe LPCC; Liliya Dumont [facility]; Yordy White LPCC  TOPIC: MH EBP Group: Specialty Awareness  [facility] Adult Partial Hospitalization Program  TRACK: [facility]    NUMBER OF PARTICIPANTS: 9    Summary of Group / Topics Discussed:  Specialty Topics: Education Support Network: Patients worked on identifying the mild, moderate, and severe symptoms of their disorder.  Patients identified helpful supportive statements and actions they would appreciate from caregivers.  The goal is to gather information in preparation for the education support network for problem solving and planning for support with caregivers.    Education Support Network:  Patients and caregivers received an overview of diagnoses including physical, intellectual, and behavioral indicators of illness. Patients presented information created in the support network development group to engage caregivers in planning for help and support to manage mental health symptoms.  The goal is to help patients and caregivers create a plan for support and assistance after discharge.      Patient Session Goals / Objectives:    Understanding diagnoses and accompanying physical reactions, thoughts, and behaviors.      Explored options to support patients in their recovery     Formulated a plan with caregivers for assistance and support to aid in recovery     Problem solved barriers to follow through on plan  Telemedicine Visit: The patient's condition can be safely assessed and treated via synchronous audio and visual telemedicine encounter.      Reason for Telemedicine Visit: Services only offered telehealth and due to COVID-19    Originating Site (Patient Location): Patient's home    Distant Site (Provider Location): Provider Remote  Setting    Consent:  The patient/guardian has verbally consented to: the potential risks and benefits of telemedicine (video visit) versus in person care; bill my insurance or make self-payment for services provided; and responsibility for payment of non-covered services.     Mode of Communication:  Video Conference via Zoom    As the provider I attest to compliance with applicable laws and regulations related to telemedicine.        Patient Participation / Response:  Fully participated with the group by sharing personal reflections / insights and openly received / provided feedback with other participants.    Demonstrated understanding of topics discussed through group discussion and participation, Identified / Expressed readiness to act on skill suggestions discussed in topic and Practiced skills in session    Patient brought his wife to this session with him.     Treatment Plan:  Patient has a current master individualized treatment plan.  See Epic treatment plan for more information.    Vale Pepe, [facility]

## 2021-04-13 NOTE — GROUP NOTE
Psychoeducation Group Note    PATIENT'S NAME: Jailene Breen  MRN:   [id number]  :   1965  ACCT. NUMBER: [id number]  DATE OF SERVICE: 21  START TIME:  3:00 PM  END TIME:  3:50 PM  FACILITATOR: Devon Rubin OT  TOPIC: [facility] OT Group: Self- Regulation Skills  [facility] Adult Partial Hospitalization Program  TRACK:1    Telemedicine Visit: The patient's condition can be safely assessed and treated via synchronous audio and visual telemedicine encounter.      Reason for Telemedicine Visit: Services only offered telehealth and Covid 19    Originating Site (Patient Location): Patient's home    Distant Site (Provider Location): [facility]: [facility] Building: Outpatient Mental Health and Addiction Services    Consent:  The patient/guardian has verbally consented to: the potential risks and benefits of telemedicine (video visit) versus in person care; bill my insurance or make self-payment for services provided; and responsibility for payment of non-covered services.     Mode of Communication:  Video Conference via Medical Zoom    As the provider I attest to compliance with applicable laws and regulations related to telemedicine.      NUMBER OF PARTICIPANTS: 8    Summary of Group / Topics Discussed:  Self-Regulation Skills: Sensory Enhanced Mindfulness: Patients were provided with written and verbal psychoeducation on the concept of integrating mindfulness with a bottom up, sensory rich, experiential intervention activities to develop self-awareness and skills for self-regulation.  Emphasis placed on the benefits of mindfulness through bottom up (body based) activities and how they can help to emotionally ground oneself or provide a healthy distraction to self-regulate when distressed. Patients worked to increase knowledge and skills during a guided skilled structured sensory enhanced activity: focused activities, activities of daily living, quiet meditation and active  meditation practices can build resiliency self-efficacy. Facilitation of reflection to reinforce taught concepts was provided.     Patient Session Goals / Objectives:    Identified how using sensory enhanced mindfulness practices can be used for grounding, stress management, and self-regulation      Improved awareness of different types of sensory enhanced mindfulness activities that assist with healthy coping of stress and symptoms      Established a plan for practice of these skills in their own environments    Practiced and reflected on how to generalize taught skills to their everyday life        Patient Participation / Response:  Fully participated with the group by sharing personal reflections / insights and openly received / provided feedback with other participants.    Verbalized understanding of content and Patient would benefit from additional opportunities to practice the content to be able to generalize it to their everyday life with increased intentionality, consistency, and efficacy in support of their psychiatric recovery    Treatment Plan:  Patient has a current master individualized treatment plan.  See Epic treatment plan for more information.    Devon Rubin, OT

## 2021-04-13 NOTE — GROUP NOTE
Psychoeducation Group Note    PATIENT'S NAME: Jailene Breen  MRN:   [id number]  :   1965  ACCT. NUMBER: [id number]  DATE OF SERVICE: 21  START TIME:  2:00 PM  END TIME:  2:50 PM  FACILITATOR: Gris Joshua RN  TOPIC: [facility] RN Group: [facility] Adult Partial Hospitalization Program  TRACK: 1    NUMBER OF PARTICIPANTS: 7    Summary of Group / Topics Discussed:  Foundations of Health: Sleep: Pathophysiology of sleep disorders: The anatomy of sleep was reviewed including structures, stages, mechanisms, and the purpose that sleep has on the brain. Sleep disorders were discussed within the group with a focus on gaining knowledge about the etiology and pathophysiology of insomnia, sleep apnea, restless leg syndrome, narcolepsy, medications that can cause risk for sleeping disorders, and other symptoms/factors that may interfere with sleep. Risk factors for developing sleep disorders were discussed and treatments/pharmacological options were explored.     Patient Session Goals / Objectives:  ? Described the effect and purpose of sleep on the brain and identify the amount of sleep needed  ? Identified differences between sleep disorders and risks associated with sleep disorders  ? Described the connection between sleep disturbances and mental illness    Increased knowledge about treatments for sleep disorders  Telemedicine Visit: The patient's condition can be safely assessed and treated via synchronous audio and visual telemedicine encounter.      Reason for Telemedicine Visit: Services only offered telehealth    Originating Site (Patient Location): Patient's home    Distant Site (Provider Location): Provider Remote Setting    Consent:  The patient/guardian has verbally consented to: the potential risks and benefits of telemedicine (video visit) versus in person care; bill my insurance or make self-payment for services provided; and responsibility for payment of non-covered  services.     Mode of Communication:  Video Conference via [employer]    As the provider I attest to compliance with applicable laws and regulations related to telemedicine.       Patient Participation / Response:  Moderately participated, sharing some personal reflections / insights and adequately adequately received / provided feedback with other participants.    Identified / Expressed personal readiness to practice skills    Treatment Plan:  Patient has a current master individualized treatment plan.  See Epic treatment plan for more information.    Gris Joshua RN

## 2021-04-13 NOTE — GROUP NOTE
Psychoeducation Group Note    PATIENT'S NAME: Jailene Breen  MRN:   [id number]  :   1965  ACCT. NUMBER: [id number]  DATE OF SERVICE: 21  START TIME:  1:00 PM  END TIME:  1:50 PM  FACILITATOR: Devon Rubin OT  TOPIC: [facility] OT Group: Lifestyle Balance and Structure  [facility] Adult Partial Hospitalization Program  TRACK: 1    Telemedicine Visit: The patient's condition can be safely assessed and treated via synchronous audio and visual telemedicine encounter.      Reason for Telemedicine Visit: Services only offered telehealth and Covid 19    Originating Site (Patient Location): Patient's home    Distant Site (Provider Location): [facility]: [facility]: Outpatient Mental Health and Addiction Services    Consent:  The patient/guardian has verbally consented to: the potential risks and benefits of telemedicine (video visit) versus in person care; bill my insurance or make self-payment for services provided; and responsibility for payment of non-covered services.     Mode of Communication:  Video Conference via Medical Zoom    As the provider I attest to compliance with applicable laws and regulations related to telemedicine.      NUMBER OF PARTICIPANTS: 8    Summary of Group / Topics Discussed:  Lifestyle Balance and Structure:  OT Goal-setting & integration: To support progress towards treatment goals and psychiatric recovery, group members were led through a weekly structured process to reflect on progress, integrate new learning/skills, and emerging self-awareness into their daily and weekly life. Provided psychoeducation on the neuroscience of change, self-compassion, and the anatomy of a SMART goal to the group. Facilitated the sharing of individual goals with validation, support, and feedback provided.    Patient Session Goals / Objectives:    Reflected on and create a vision for recovery and wellbeing    Identified and wrote 3 SMART goals for the  week    Identified and problem solved barriers to achieving identified goals     Identified plan to support follow through on goals and reflection on progress made        Patient Participation / Response:  Fully participated with the group by sharing personal reflections / insights and openly received / provided feedback with other participants.    Verbalized understanding of content and Patient would benefit from additional opportunities to practice the content to be able to generalize it to their everyday life with increased intentionality, consistency, and efficacy in support of their psychiatric recovery    Treatment Plan:  Patient has a current master individualized treatment plan.  See Epic treatment plan for more information.    Devon Rubin, OT

## 2021-04-13 NOTE — ADDENDUM NOTE
Encounter addended by: Yordy White [facility] on: 4/13/2021 11:05 AM   Actions taken: Clinical Note Signed

## 2021-04-14 ENCOUNTER — HOSPITAL ENCOUNTER (OUTPATIENT)
Dept: BEHAVIORAL HEALTH | Facility: CLINIC | Age: 56
End: 2021-04-14
Attending: PSYCHIATRY & NEUROLOGY
Payer: COMMERCIAL

## 2021-04-14 ENCOUNTER — MYC MEDICAL ADVICE (OUTPATIENT)
Dept: PSYCHIATRY | Facility: CLINIC | Age: 56
End: 2021-04-14

## 2021-04-14 PROCEDURE — H0035 MH PARTIAL HOSP TX UNDER 24H: HCPCS | Mod: 95

## 2021-04-14 PROCEDURE — H0035 MH PARTIAL HOSP TX UNDER 24H: HCPCS | Mod: GT | Performed by: COUNSELOR

## 2021-04-14 PROCEDURE — H0035 MH PARTIAL HOSP TX UNDER 24H: HCPCS | Mod: GT

## 2021-04-14 PROCEDURE — H0035 MH PARTIAL HOSP TX UNDER 24H: HCPCS | Mod: 95 | Performed by: SOCIAL WORKER

## 2021-04-14 NOTE — PROGRESS NOTES
Adult Mental Health Intensive Outpatient Discharge Summary/Instructions      Patient: Jailene Breen MRN: [id number]   : 1965 Age: 55 year old Sex: male     Admission Date: 21  Discharge Date: 21  Diagnosis: Axis I:  Major depressive disorder, recurrent.  Generalized anxiety disorder.  Panic disorder.  Alcohol use disorder, in remission.   Axis II:  Deferred.   Axis III:  Chronic pain.      Focus of Treatment / Progress    Personal Safety:     * Follow your safety plan     * Call crisis lines as needed:    [facility] [phone] [facility] [phone]  [facility] [phone] Crisis Connection [phone]  [facility] [phone] [facility] [phone]  [facility] [phone] National Suicide Prevention [phone]  [facility] [phone] Suicide Prevention [phone]  [facility] [phone]    Managing symptoms of:  Hopeless and helpless    Community support/health:  [facility] [employer] ([employer])    Managing Symptoms and Preventing Relapse    * Go to all of your appointments    * Take all medications as directed.      * Carry a current list if medication with you    * Do not use illicit (street) drugs.  Avoid alcohol    * Report these symptoms to your care team. These are early signs of relapse:   Thoughts of suicide   Losing more sleep   Mood getting worse   Feeling more irritable   Other:  Isolation, pain problems increasing    *Use these skills daily:  Mindful meditation, relaxed breathing, physical activity such as walking, playing with dogs, get out of the house for awhile, continue to seek out support from your wife    Copy of summary sent to: Patient via [employer]    Follow up with psychiatrist / main caregiver: Dr. Slim Sage     Next visit: 2021    Follow up with your therapist: Contact [facility] and request a new appointment with your therapist, or talk with Dr. Sage about the therapist referral since the  first appointment was missed.(1/week therapy sessions recommended)    Go to group therapy and / or support groups at: You are on the waiting list for adult day treatment at [place].  The estimated wait time is 2 to 3 weeks, you will be contacted when an opening comes up. If you have questions you can call the Adult Day Treatment program at [phone].  Connect with supportive friends from you AA groups and consider going to AA groups again for support.      See your medical doctor about:  Annual physical exams and any medical concerns that may arise.      Other:  Your treatment team enjoyed the opportunity to work with you, Les!    Client Signature:__Unavailable to sign due to COVID-19_____  Date / Time:__4/14/21____  Staff Signature:_Yordy White [facility] on 4/15/2021 at 4:05 PM

## 2021-04-14 NOTE — PROGRESS NOTES
Acknowledgement of Current Treatment Plan       I have reviewed my treatment plan with my therapist / counselor on 04/14/21.   I agree with the plan as it is written in the electronic health record.    Name:      Signature:  Jailene Breen Unavailable to sign due to COVID-19.  RAMOS Chavarria on 4/14/2021 at 11:45 AM     Dr. Jw Adkins MD  Psychiatrist    Liliya Dumont MS, [facility], BC-DMT  Psychotherapist     Yordy White MA, LMFT RAMOS Chavarria on 4/14/2021 at 11:45 AM     Vale Pepe MA, [facility]  Psychotherapist

## 2021-04-14 NOTE — PROGRESS NOTES
[facility]   Dr. Adkins's Psychiatric Progress Note  2021      Patient:  Jailene Breen   Medical Record Number:  [id number]  :  1965/  :    Telemedicine Visit: The patient's condition can be safely assessed and treated via synchronous audio and visual telemedicine encounter.       Reason for Telemedicine Visit: COVID-19 lockdown     Originating Site (Patient Location):  HOME     Distant Site (Provider Location): [facility]: [facility]     Consent:  The patient/guardian has verbally consented to: the potential risks and benefits of telemedicine (video visit) versus in person care; bill my insurance or make self-payment for services provided; and responsibility for payment of non-covered services.        Interim History:   The patient's care was discussed with the treatment team and chart notes were reviewed.    21:  Pt sees rheum this morning.  Appt was ok.  Needs to find another rheum doc as she's leaving.  Pt has low Vit D.  He'll start high dose Vit D.  Dealing with financial issues.  Weekend plans: get outside and shoot photos.      21:  Weekend was ok.  He got outside with in-laws and the dogs and went walking.  Mood over the weekend was blah blah.  It's a little bit better than a week ago.  Mood is same with structured time than not.  In group he gets anxious when it's his turn to talk.  This weekend was the best he's felt in a long time.      21:  Pt could be doing better.  Not doing well.  Took prn Klonopin yesterday for anxiety.  On current Abilify and Prozac x 2 weeks.  Has some benefits.  Pain is bad.  Mad that suboxone is not helping.  Sees that doc in a couple weeks.     21:  Groups are ok.  Depression is same.  Weekend: go to in-laws to celebrate Easter and wife's birthday.      21: Weekend  Was ok.  Went by fast.  Sat. Was wife's birthday and they went to her parents for a meal and then went home and watched movies.   Enjoyed that.      4/14/21:  Doing ok.  Considering Day Tx.      Psychiatric ROS:  Mood:  A little better;  More depression than anxiety  Sleep:  Initial and middle insomnia  Appetite:  normal  Eating:  normal  Energy Level:  low  Concentration/Memory Problems:  NO  Suicidal Thoughts:  No  Homicidal Thoughts:No  Psychotic Symptoms: No  Medication Side Effects:No  Medication Compliance:Yes   Physical Complaints:  positive for pain all over; worst in hip, lower back and knees;  suboxone does not help;  Saw pain doc yesterday.  He's frustrated with suboxone, which doesn't help even after doc doubled the dose.  Considering medical marijuana.           Medications:     PAST PSYCH MEDS:  Prozac, Lexapro, nortriptyline, Remeron, Abilify, trazodone, melatonin, Klonopin    Current Outpatient Medications   Medication Sig     acetaminophen (TYLENOL) 500 MG tablet Take 1,000 mg by mouth every 6 hours as needed for mild pain (headache)     ARIPiprazole (ABILIFY) 2 MG tablet Take 2 tablet (4 mg) by mouth daily     atenolol (TENORMIN) 25 MG tablet Take 1 tablet (25 mg) by mouth daily     buprenorphine HCl-naloxone HCl (SUBOXONE) 4-1 MG per film Place 1 Film under the tongue daily     buprenorphine HCl-naloxone HCl (SUBOXONE) 8-2 MG per film Use 0.5 film under the tongue every 8 hours for pain. Max 1.5 films per day. Fill and begin 4/6/2021.  10 day supply     chlorzoxazone (PARAFON FORTE) 500 MG tablet Take 1 tablet (500 mg) by mouth 3 times daily as needed for muscle spasms     cholecalciferol (VITAMIN D3) 125 mcg (5000 units) capsule Take 125 mcg by mouth daily     clonazePAM (KLONOPIN) 1 MG tablet TAKE ONE-HALF - 1 TABLET BY MOUTH ONCE DAILY AS NEEDED FOR ANXIETY     diclofenac (VOLTAREN) 1 % topical gel Apply 2 g topically 4 times daily     etanercept (ENBREL SURECLICK) 50 MG/ML autoinjector Inject 50 mg Subcutaneous once a week (Patient taking differently: Inject 50 mg Subcutaneous once a week Every Tuesday)     fish  "oil-omega-3 fatty acids 1000 MG capsule Take 1 g by mouth daily     FLUoxetine (PROZAC) 40 MG capsule Take 1 capsule (40 mg) by mouth daily     folic acid (FOLVITE) 1 MG tablet Take 5 tablets (5 mg) by mouth daily     Lidocaine (LIDOCARE) 4 % Patch Place 1-3 patches onto the skin every 24 hours To prevent lidocaine toxicity, patient should be patch free for 12 hrs daily.     lidocaine (XYLOCAINE) 5 % external ointment Apply topically every 4 hours as needed for moderate pain     liothyronine (CYTOMEL) 25 MCG tablet Take 1 tablet (25 mcg) by mouth daily     melatonin 3 MG tablet Take 1 tablet (3 mg) by mouth At Bedtime     menthol, Topical Analgesic, 2.5% (BENGAY VANISHIN SCENT) 2.5 % GEL topical gel Apply topically every 6 hours as needed for moderate pain     methotrexate sodium 2.5 MG TABS Take 4 tablets (10 mg) by mouth every 7 days (Patient taking differently: Take 4 tablets by mouth every 7 days Every tuesday)     multivitamin (CENTRUM SILVER) tablet Take 1 tablet by mouth daily     nicotine polacrilex (NICORETTE) 4 MG gum PLACE 1 PIECE INSIDE THE CHEEK AS NEEDED FOR SMOKING CESSATION     omeprazole (PRILOSEC) 20 MG DR capsule Take 20 mg by mouth daily      prazosin (MINIPRESS) 1 MG capsule Take 1 capsule (1 mg) by mouth At Bedtime     traZODone (DESYREL) 50 MG tablet Take 50 to 100mg at bedtime for sleep     No current facility-administered medications for this encounter.              Allergies:     Allergies   Allergen Reactions     Mirtazapine      Other reaction(s): Coma     Hydrocodone Itching     Ms Contin [Morphine] Itching     Remeron Soltab Other (See Comments)     \"Blacked out\" for one week            Psychiatric Examination:   There were no vitals taken for this visit.  Weight is 0 lbs 0 oz  There is no height or weight on file to calculate BMI.    Appearance:  awake, alert and adequately groomed  Attitude:  cooperative  Eye Contact:  good  Mood:   Less depressed  Affect:  mood congruent  Speech:  " clear, coherent  Psychomotor Behavior:  no evidence of tardive dyskinesia, dystonia, or tics  Throught Process:  logical  Associations:  no loose associations  Thought Content:   Passive SI;  No plan or intent;  Contracts no self harm;  No homicidal ideation and no evidence of psychotic thought  Insight:  good  Judgement:  intact  Oriented to:  time, person, and place  Attention Span and Concentration:  intact  Recent and Remote Memory:  intact  Gait:Normal    Risk/Potential for Dangerousness:  Multiple Active Diagnoses:HIGH  Self Care:HIGH  Suicide:LOW  Assault:LOW  Self Injurious Behaviors:LOW  Inappropriate Sexual Behavior:LOW         Labs:   No results found for this or any previous visit (from the past 24 hour(s)).     Recent Results (from the past 1008 hour(s))   Asymptomatic SARS-CoV-2 COVID-19 Virus (Coronavirus) by PCR    Collection Time: 03/16/21 12:47 PM    Specimen: Nasopharyngeal   Result Value Ref Range    SARS-CoV-2 Virus Specimen Source Nasopharyngeal     SARS-CoV-2 PCR Result NEGATIVE     SARS-CoV-2 PCR Comment (Note)    Drug abuse screen 77 urine (FL, RH, SH)    Collection Time: 03/16/21  1:23 PM   Result Value Ref Range    Amphetamine Qual Urine Negative NEG^Negative    Barbiturates Qual Urine Negative NEG^Negative    Benzodiazepine Qual Urine Negative NEG^Negative    Cannabinoids Qual Urine Negative NEG^Negative    Cocaine Qual Urine Negative NEG^Negative    Opiates Qualitative Urine Negative NEG^Negative    PCP Qual Urine Negative NEG^Negative   CBC with platelets differential    Collection Time: 03/17/21  7:48 AM   Result Value Ref Range    WBC 8.3 4.0 - 11.0 10e9/L    RBC Count 4.95 4.4 - 5.9 10e12/L    Hemoglobin 15.0 13.3 - 17.7 g/dL    Hematocrit 44.1 40.0 - 53.0 %    MCV 89 78 - 100 fl    MCH 30.3 26.5 - 33.0 pg    MCHC 34.0 31.5 - 36.5 g/dL    RDW 13.3 10.0 - 15.0 %    Platelet Count 211 150 - 450 10e9/L    Diff Method Automated Method     % Neutrophils 53.5 %    % Lymphocytes 33.0 %    %  Monocytes 8.0 %    % Eosinophils 4.3 %    % Basophils 1.0 %    % Immature Granulocytes 0.2 %    Nucleated RBCs 0 0 /100    Absolute Neutrophil 4.4 1.6 - 8.3 10e9/L    Absolute Lymphocytes 2.7 0.8 - 5.3 10e9/L    Absolute Monocytes 0.7 0.0 - 1.3 10e9/L    Absolute Eosinophils 0.4 0.0 - 0.7 10e9/L    Absolute Basophils 0.1 0.0 - 0.2 10e9/L    Abs Immature Granulocytes 0.0 0 - 0.4 10e9/L    Absolute Nucleated RBC 0.0    Comprehensive metabolic panel    Collection Time: 03/17/21  7:48 AM   Result Value Ref Range    Sodium 142 133 - 144 mmol/L    Potassium 4.1 3.4 - 5.3 mmol/L    Chloride 110 (H) 94 - 109 mmol/L    Carbon Dioxide 27 20 - 32 mmol/L    Anion Gap 5 3 - 14 mmol/L    Glucose 92 70 - 99 mg/dL    Urea Nitrogen 12 7 - 30 mg/dL    Creatinine 1.12 0.66 - 1.25 mg/dL    GFR Estimate 74 >60 mL/min/[1.73_m2]    GFR Estimate If Black 86 >60 mL/min/[1.73_m2]    Calcium 8.4 (L) 8.5 - 10.1 mg/dL    Bilirubin Total 0.6 0.2 - 1.3 mg/dL    Albumin 3.3 (L) 3.4 - 5.0 g/dL    Protein Total 6.7 (L) 6.8 - 8.8 g/dL    Alkaline Phosphatase 91 40 - 150 U/L    ALT 30 0 - 70 U/L    AST 17 0 - 45 U/L   TSH with free T4 reflex and/or T3 as indicated    Collection Time: 03/17/21  7:48 AM   Result Value Ref Range    TSH 2.01 0.40 - 4.00 mU/L   Folate    Collection Time: 03/17/21  7:48 AM   Result Value Ref Range    Folate 17.2 >5.4 ng/mL   Vitamin B12    Collection Time: 03/17/21  7:48 AM   Result Value Ref Range    Vitamin B12 466 193 - 986 pg/mL   Drug abuse screen 6 urine (tox)    Collection Time: 03/21/21  2:05 AM   Result Value Ref Range    Amphetamine Qual Urine Negative NEG^Negative    Barbiturates Qual Urine Negative NEG^Negative    Benzodiazepine Qual Urine Negative NEG^Negative    Cannabinoids Qual Urine Negative NEG^Negative    Cocaine Qual Urine Negative NEG^Negative    Ethanol Qual Urine Negative NEG^Negative    Opiates Qualitative Urine Negative NEG^Negative   Asymptomatic Influenza A/B & SARS-CoV2 (COVID-19) Virus PCR  Multiplex    Collection Time: 03/21/21  6:34 AM    Specimen: Nasopharyngeal   Result Value Ref Range    Flu A/B & SARS-COV-2 PCR Source Nasopharyngeal     SARS-CoV-2 PCR Result NEGATIVE     Influenza A PCR Negative NEG^Negative    Influenza B PCR Negative NEG^Negative    Respiratory Syncytial Virus PCR (Note)     Flu A/B & SARS-CoV-2 PCR Comment (Note)    CBC with platelets differential    Collection Time: 03/21/21  8:08 AM   Result Value Ref Range    WBC 6.8 4.0 - 11.0 10e9/L    RBC Count 5.18 4.4 - 5.9 10e12/L    Hemoglobin 15.6 13.3 - 17.7 g/dL    Hematocrit 45.7 40.0 - 53.0 %    MCV 88 78 - 100 fl    MCH 30.1 26.5 - 33.0 pg    MCHC 34.1 31.5 - 36.5 g/dL    RDW 13.1 10.0 - 15.0 %    Platelet Count 246 150 - 450 10e9/L    Diff Method Automated Method     % Neutrophils 53.7 %    % Lymphocytes 31.9 %    % Monocytes 8.9 %    % Eosinophils 4.2 %    % Basophils 1.0 %    % Immature Granulocytes 0.3 %    Nucleated RBCs 0 0 /100    Absolute Neutrophil 3.7 1.6 - 8.3 10e9/L    Absolute Lymphocytes 2.2 0.8 - 5.3 10e9/L    Absolute Monocytes 0.6 0.0 - 1.3 10e9/L    Absolute Eosinophils 0.3 0.0 - 0.7 10e9/L    Absolute Basophils 0.1 0.0 - 0.2 10e9/L    Abs Immature Granulocytes 0.0 0 - 0.4 10e9/L    Absolute Nucleated RBC 0.0    Comprehensive metabolic panel    Collection Time: 03/21/21  8:08 AM   Result Value Ref Range    Sodium 141 133 - 144 mmol/L    Potassium 4.0 3.4 - 5.3 mmol/L    Chloride 108 94 - 109 mmol/L    Carbon Dioxide 29 20 - 32 mmol/L    Anion Gap 4 3 - 14 mmol/L    Glucose 95 70 - 99 mg/dL    Urea Nitrogen 12 7 - 30 mg/dL    Creatinine 0.92 0.66 - 1.25 mg/dL    GFR Estimate >90 >60 mL/min/[1.73_m2]    GFR Estimate If Black >90 >60 mL/min/[1.73_m2]    Calcium 8.3 (L) 8.5 - 10.1 mg/dL    Bilirubin Total 0.7 0.2 - 1.3 mg/dL    Albumin 3.5 3.4 - 5.0 g/dL    Protein Total 7.2 6.8 - 8.8 g/dL    Alkaline Phosphatase 100 40 - 150 U/L    ALT 34 0 - 70 U/L    AST 20 0 - 45 U/L   TSH with free T4 reflex    Collection  Time: 03/21/21  8:08 AM   Result Value Ref Range    TSH 2.39 0.40 - 4.00 mU/L         Impression:   This is a 55 year old male continues PHP for mood stabilization.  Mood is a improving.             DIagnoses:     Axis I:  Major depressive disorder, recurrent.  Generalized anxiety disorder.  Panic disorder.  Alcohol use disorder, in remission.   Axis II:  Deferred.   Axis III:  Chronic pain.          Plan:     Complete [facility] Program on 4/15/21.   May go to Day Treatment.   Increased Abilify from 2mg to 4 mg to 5mg/d and continue Prozac 40mg, which were recently started during his partial stays.   Increase Trazodone to 50 to 100mg at bedtime prn. He never tried going up.    Expect stabilization and completion of Partial Hospital Program.   Follow-up with Dr. Sage and therapist at the [facility].     Jw Adkins MD

## 2021-04-14 NOTE — GROUP NOTE
Process Group Note    PATIENT'S NAME: Jailene Breen  MRN:   [id number]  :   1965  ACCT. NUMBER: [id number]  DATE OF SERVICE: 21  START TIME:  1:00 PM  END TIME:  1:50 PM  FACILITATOR: Yordy White [facility]; Vale Pepe [facility]  TOPIC:  Process Group    Diagnoses:  Axis I:  Major depressive disorder, recurrent.  Generalized anxiety disorder.  Panic disorder.  Alcohol use disorder, in remission.   Axis II:  Deferred.   Axis III:  Chronic pain.      [facility] Adult Partial Hospitalization Program  TRACK: [facility]    NUMBER OF PARTICIPANTS: 8    Telemedicine Visit: The patient's condition can be safely assessed and treated via synchronous audio and visual telemedicine encounter.      Reason for Telemedicine Visit: Services only offered telehealth and due to COVID-19.    Originating Site (Patient Location): Patient's home    Distant Site (Provider Location): Provider Remote Setting    Consent:  The patient/guardian has verbally consented to: the potential risks and benefits of telemedicine (video visit) versus in person care; bill my insurance or make self-payment for services provided; and responsibility for payment of non-covered services.     Mode of Communication:  Video Conference via Zoom    As the provider I attest to compliance with applicable laws and regulations related to telemedicine.        Data:    Session content: At the start of this group, patients were invited to check in by identifying themselves, describing their current emotional status, and identifying issues to address in this group.   Area(s) of treatment focus addressed in this session included Symptom Management, Personal Safety and Community Resources/Discharge Planning.  Patient reported feeling a big old hole in the gut, low ambition, no energy, and feeling worthless. Patient discussed working toward making it to groups, showing up on time and being present. Patient identified acting opposite to emotion as skills  they will use to address their goal(s). Patient reported negative self-talk and feeling worthless may be a barrier to working toward their goal(s) and/or addressing mental health symptoms. Patient reported having thoughts that he and his family would be better off if he was dead, but he stated he was able to use his safety plan to stay safe. Patient reported no substance use. Patient reported they are taking their medications as prescribed. Patient reported feeling proud that they made it to group. Patient discussed struggles with pain management, depression and negative self-talk with the treatment group.     Therapeutic Interventions/Treatment Strategies:  Psychotherapist offered support, feedback and validation and reinforced use of skills. Treatment modalities used include Motivational Interviewing and Cognitive Behavioral Therapy. Interventions include Coping Skills: Assisted patient in identifying 1-2 healthy distraction skills to reduce overall distress, Symptoms Management: Promoted understanding of their diagnoses and how it impacts their functioning and Emotions Management:  Discussed barriers to emotional regulation.    Assessment:    Patient response:   Patient responded to session by accepting feedback, giving feedback, listening, focusing on goals, being attentive and accepting support    Possible barriers to participation / learning include: severity of symptoms    Health Issues:   None reported       Substance Use Review:   Substance Use: No active concerns identified.    Mental Status/Behavioral Observations  Appearance:   Appropriate   Eye Contact:   Good   Psychomotor Behavior: Normal   Attitude:   Cooperative   Orientation:   All  Speech   Rate / Production: Normal    Volume:  Normal   Mood:    Depressed  Normal Sad   Affect:    Appropriate   Thought Content:   Clear and Safety Safety concerns have increased  Thought Form:  Coherent  Logical     Insight:    Good     Plan:     Safety Plan:  Recommended that patient call 911 or go to the local ED should there be a change in any of these risk factors.    Committed to safety and agreed to follow previously developed safety coping plan.      Barriers to treatment: None identified    Patient Contracts (see media tab):  None    Substance Use: Provided encouragement towards sobriety     Continue or Discharge: Patient will continue in Adult Partial Hospitalization Program (PHP)  as planned. Patient is likely to benefit from learning and using skills as they work toward the goals identified in their treatment plan.      Yordy White, [facility]  April 14, 2021

## 2021-04-14 NOTE — GROUP NOTE
Process Group Note    PATIENT'S NAME: Jailene Breen  MRN:   [id number]  :   1965  ACCT. NUMBER: [id number]  DATE OF SERVICE: 21  START TIME: 10:00 AM  END TIME: 10:50 AM  FACILITATOR: Yordy White [facility]; Vale Pepe [facility]  TOPIC:  Process Group    Diagnoses:  Axis I:  Major depressive disorder, recurrent.  Generalized anxiety disorder.  Panic disorder.  Alcohol use disorder, in remission.   Axis II:  Deferred.   Axis III:  Chronic pain.      [facility] Adult Partial Hospitalization Program  TRACK: PHP 1    NUMBER OF PARTICIPANTS: 5    Telemedicine Visit: The patient's condition can be safely assessed and treated via synchronous audio and visual telemedicine encounter.      Reason for Telemedicine Visit: Services only offered telehealth and due to COVID-19.    Originating Site (Patient Location): Patient's home    Distant Site (Provider Location): Provider Remote Setting    Consent:  The patient/guardian has verbally consented to: the potential risks and benefits of telemedicine (video visit) versus in person care; bill my insurance or make self-payment for services provided; and responsibility for payment of non-covered services.     Mode of Communication:  Video Conference via Zoom    As the provider I attest to compliance with applicable laws and regulations related to telemedicine.            Data:    Session content: At the start of this group, patients were invited to check in by identifying themselves, describing their current emotional status, and identifying issues to address in this group.   Area(s) of treatment focus addressed in this session included Symptom Management, Personal Safety and Community Resources/Discharge Planning.  Patient reported a feeling of a hole in the gut. Patient discussed working toward going for a walk. Patient identified going for a walk as skills they will use to address their goal(s). Patient reported laziness may be a barrier to working  toward their goal(s) and/or addressing mental health symptoms. Patient reported no safety concerns and/or self-injurious behaviors. Patient reported no substance use. Patient reported they are taking their medications as prescribed. Patient reported feeling proud that they are completing the PHP program. Patient discussed graduating PHP with the treatment group.     Therapeutic Interventions/Treatment Strategies:  Psychotherapist offered support, feedback and validation and reinforced use of skills. Treatment modalities used include Motivational Interviewing and Cognitive Behavioral Therapy. Interventions include Coping Skills: Assisted patient in identifying 1-2 healthy distraction skills to reduce overall distress, Symptoms Management: Promoted understanding of their diagnoses and how it impacts their functioning and Emotions Management:  Discussed barriers to emotional regulation.    Assessment:    Patient response:   Patient responded to session by accepting feedback, giving feedback, listening, focusing on goals, being attentive and accepting support    Possible barriers to participation / learning include: severity of symptoms    Health Issues:   None reported       Substance Use Review:   Substance Use: No active concerns identified.    Mental Status/Behavioral Observations  Appearance:   Appropriate   Eye Contact:   Good   Psychomotor Behavior: Normal   Attitude:   Cooperative   Orientation:   All  Speech   Rate / Production: Normal    Volume:  Normal   Mood:    Depressed  Normal  Affect:    Appropriate   Thought Content:   Clear and Safety denies any current safety concerns including suicidal ideation, self-harm, and homicidal ideation  Thought Form:  Coherent  Logical     Insight:    Good     Plan:     Safety Plan: No current safety concerns identified.  Recommended that patient call 911 or go to the local ED should there be a change in any of these risk factors.     Barriers to treatment: None  identified    Patient Contracts (see media tab):  None    Substance Use: Provided encouragement towards sobriety     Continue or Discharge: Patient will continue in Adult Partial Hospitalization Program (PHP)  as planned. Patient is likely to benefit from learning and using skills as they work toward the goals identified in their treatment plan.      Yordy White, [facility]  April 15, 2021

## 2021-04-14 NOTE — GROUP NOTE
Psychoeducation Group Note    PATIENT'S NAME: Jailene Breen  MRN:   [id number]  :   1965  ACCT. NUMBER: [id number]  DATE OF SERVICE: 21  START TIME:  9:00 AM  END TIME:  9:50 AM  FACILITATOR: Gris Joshua RN  TOPIC: ALTAF RN Group: Brain [facility] Adult Partial Hospitalization Program  TRACK: 1    NUMBER OF PARTICIPANTS: 4    Summary of Group / Topics Discussed:  Brain Health:  Pathophysiology of stress and anxiety: Patients were educated on the difference between stress, chronic stress, and anxiety. The anatomy and pathophysiology of the body/brain were reviewed to illustrate the immediate effects of stress/anxiety in the body and the long term effects and increased risks for chronic disease that come from unmanaged stress/anxiety. Self-coping strategies to manage symptoms of stress were reviewed and pharmacologic, psychotherapeutic, and complementary treatment options were discussed.    Patient Session Goals / Objectives:  ? Described the differences between stress and anxiety and how the body responds to it  ? Listed the long term effects and increased risks for chronic disease that can arise from unmanaged stress/anxiety  ? Identified and described pharmacologic, psychotherapeutic, and complementary treatment options  Telemedicine Visit: The patient's condition can be safely assessed and treated via synchronous audio and visual telemedicine encounter.      Reason for Telemedicine Visit: Services only offered telehealth    Originating Site (Patient Location): Patient's home    Distant Site (Provider Location): Provider Remote Setting    Consent:  The patient/guardian has verbally consented to: the potential risks and benefits of telemedicine (video visit) versus in person care; bill my insurance or make self-payment for services provided; and responsibility for payment of non-covered services.     Mode of Communication:  Video Conference via Search to Phone    As the provider I  attest to compliance with applicable laws and regulations related to telemedicine.       Patient Participation / Response:  Fully participated with the group by sharing personal reflections / insights and openly received / provided feedback with other participants.    Identified / Expressed personal readiness to practice skills    Treatment Plan:  Patient has a current master individualized treatment plan.  See Epic treatment plan for more information.    Gris Joshua RN

## 2021-04-14 NOTE — GROUP NOTE
Psychotherapy Group Note    PATIENT'S NAME: Jailene Breen  MRN:   [id number]  :   1965  ACCT. NUMBER: [id number]  DATE OF SERVICE: 21  START TIME:  2:00 PM  END TIME:  2:50 PM  FACILITATOR: Liliya Dumont LICSW  TOPIC: MH EBP Group: Enhanced Mindfulness  [facility] Adult Partial Hospitalization Program  TRACK: 1    NUMBER OF PARTICIPANTS: 4    Summary of Group / Topics Discussed:  Enhanced Mindfulness: Body and Mind Integration: Patients received an overview and education regarding the importance of including the body in the management of emotional health and self-care and as a direct route to mindfulness practice.  Patients discussed various ways in which the body can serve as an informant to their physical and emotional experiences/need. Patients discussed the body as a direct link to the present moment and to mindfulness practice.  Patients discussed current relationship with body, self-awareness, mindfulness practice and barriers to connection with body.  Patients were guided through breath work and movement to facilitate greater self-awareness, grounding, self-expression, and connection to other.  Patients discussed how the experiential could be applied to better manage mental health and develop pickett connection to self.    Patient Session Goals / Objectives:    Identify how movement awareness could be used for grounding, stress management, self-expression, connection to other and self-regulation    Improved awareness of breath and movement preferences    Identify how movement and the body is used in mindfulness practice    Reflect on use of these practices in everyday life.    Identify barriers to attending to body    Telemedicine Visit: The patient's condition can be safely assessed and treated via synchronous audio and visual telemedicine encounter.          Reason for Telemedicine Visit: Services only offered telehealth and covid19        Originating Site (Patient Location):  Patient's home        Distant Site (Provider Location): Provider Remote Setting        Consent:  The patient/guardian has verbally consented to: the potential risks and benefits of telemedicine (video visit) versus in person care; bill my insurance or make self-payment for services provided; and responsibility for payment of non-covered services.         Mode of Communication:  Video Conference via Stypi        As the provider I attest to compliance with applicable laws and regulations related to telemedicine.         Patient Participation / Response:  Fully participated with the group by sharing personal reflections / insights and openly received / provided feedback with other participants.    Demonstrated understanding of topics discussed through group discussion and participation and Practiced skills in session    Treatment Plan:  Patient has See Epic Treatment Plan - Patient is discharging.    WALLY ChatmanSW

## 2021-04-15 NOTE — ADDENDUM NOTE
Encounter addended by: Yordy White, [facility] on: 4/15/2021 4:51 PM   Actions taken: Flowsheet accepted

## 2021-04-15 NOTE — GROUP NOTE
Psychoeducation Group Note    PATIENT'S NAME: Jailene Breen  MRN:   [id number]  :   1965  ACCT. NUMBER: [id number]  DATE OF SERVICE: 21  START TIME: 11:00 AM  END TIME: 11:50 AM  FACILITATOR: Devon Rubin OT  TOPIC: [facility] OT Group: Lifestyle Balance and Structure  [facility] Adult Partial Hospitalization Program  TRACK: 1    Telemedicine Visit: The patient's condition can be safely assessed and treated via synchronous audio and visual telemedicine encounter.      Reason for Telemedicine Visit: Services only offered telehealth and covid 19    Originating Site (Patient Location): Patient's home    Distant Site (Provider Location): [facility]: [facility] Building: Outpatient Mental Health and Addiction Services    Consent:  The patient/guardian has verbally consented to: the potential risks and benefits of telemedicine (video visit) versus in person care; bill my insurance or make self-payment for services provided; and responsibility for payment of non-covered services.     Mode of Communication:  Video Conference via Medical Zoom    As the provider I attest to compliance with applicable laws and regulations related to telemedicine.      NUMBER OF PARTICIPANTS: 5    Summary of Group / Topics Discussed:  Lifestyle Balance and Structure:  Stress management and prioritizing:  Patients were provided psychoeducation on an evidenced framework to help them identify areas in their lives (roles, routines, rituals, relationships, habits) that are most important to them. Facilitated a structured experiential process using a stovetop as a metaphor, where patients worked to balance what is on their stove top, what they need to put on back burner, take off, or what needs to be turned up or turned down. Central to the process is to first address the  light as a metaphor for self-care and self-compassion. They identified skills/strategies to help manage each item on their  stovetop and addressed barriers. Patients then shared their stovetops and reflected on their process with the group.     Patient Session Goals / Objectives:    Reflected on possibilities of what lifestyle balance consists of though a group brainstorming process.     Identified areas of imbalance in their lives as well areas that are balanced.    Apply a values based prioritizing process to their most important life roles, routines, habits, rituals, and relationships and work to create a schema for lifestyle balance.     Identify strategies and skills to meet specific needs and address barriers         Patient Participation / Response:  Fully participated with the group by sharing personal reflections / insights and openly received / provided feedback with other participants.    Verbalized understanding of content and Patient would benefit from additional opportunities to practice the content to be able to generalize it to their everyday life with increased intentionality, consistency, and efficacy in support of their psychiatric recovery    Treatment Plan:  Patient has a current master individualized treatment plan.  See Epic treatment plan for more information.    Devon Rubin, OT

## 2021-04-15 NOTE — TELEPHONE ENCOUNTER
Yes, I have been following Les' course since his hospitalization. Appears to be under the care of Dr. Adkins through [facility] with planned discharge today; sounds like he has decided to pursue day-treatment, which I believe is a good idea. I'm happy to meet with him on th 23rd.  Thanks for coordinating,  Slim

## 2021-04-15 NOTE — GROUP NOTE
Psychoeducation Group Note    PATIENT'S NAME: Jailene Breen  MRN:   [id number]  :   1965  ACCT. NUMBER: [id number]  DATE OF SERVICE: 21  START TIME:  1:00 PM  END TIME:  1:50 PM  FACILITATOR: Devon Rubin OT  TOPIC: [facility] OT Group: Partial Hospitalization Program- Occupational Therapy  [facility] Adult Partial Hospitalization Program  TRACK: 1    Telemedicine Visit: The patient's condition can be safely assessed and treated via synchronous audio and visual telemedicine encounter.      Reason for Telemedicine Visit: Services only offered telehealth and Covid 19    Originating Site (Patient Location): Patient's home    Distant Site (Provider Location): [facility]: [facility] Building: Outpatient Mental Health and Addiction Services    Consent:  The patient/guardian has verbally consented to: the potential risks and benefits of telemedicine (video visit) versus in person care; bill my insurance or make self-payment for services provided; and responsibility for payment of non-covered services.     Mode of Communication:  Video Conference via Medical Zoom    As the provider I attest to compliance with applicable laws and regulations related to telemedicine.      NUMBER OF PARTICIPANTS: 4    Summary of Group / Topics Discussed:L  Lifestyle Health:  Occupations: A Balanced Lifestyle: Patients were introduced to the importance of daily occupations in support of mental health management by exploring a balanced lifestyle.  Patients identified how they spend their time and skills to bring this into better balance.  Patients were assisted to establish, restore, and/or modify performance skills and patterns for improved engagement and promotion of positive mental health through meaningful occupations.  They engaged in an experiential process built around behavioral activation principals to promote participation. Patients gained awareness of the connection between who they are  (self identity) with what they do.    Patient Session Goals / Objectives:    Increased awareness of how patient's functioning in identified meaningful occupations are impacted by their mental health status     Worked to develop performance skills and performance patterns to enhance occupational engagement through behavior activation strategies to create a balanced lifestyle    Explored ways to generalize new awareness and skills to their everyday life            Patient Participation / Response:  Fully participated with the group by sharing personal reflections / insights and openly received / provided feedback with other participants.    Verbalized understanding of content and Patient would benefit from additional opportunities to practice the content to be able to generalize it to their everyday life with increased intentionality, consistency, and efficacy in support of their psychiatric recovery    Treatment Plan:  Patient has a current master individualized treatment plan.  See Epic treatment plan for more information.    Devon Rubin, OT

## 2021-04-16 ENCOUNTER — IMMUNIZATION (OUTPATIENT)
Dept: NURSING | Facility: CLINIC | Age: 56
End: 2021-04-16
Attending: INTERNAL MEDICINE
Payer: COMMERCIAL

## 2021-04-16 PROCEDURE — 0012A PR COVID VAC MODERNA 100 MCG/0.5 ML IM: CPT

## 2021-04-16 PROCEDURE — 91301 PR COVID VAC MODERNA 100 MCG/0.5 ML IM: CPT

## 2021-04-16 RX ORDER — BUPRENORPHINE AND NALOXONE 8; 2 MG/1; MG/1
FILM, SOLUBLE BUCCAL; SUBLINGUAL
Qty: 30 EACH | Refills: 0 | Status: SHIPPED | OUTPATIENT
Start: 2021-04-16 | End: 2021-04-30

## 2021-04-16 NOTE — TELEPHONE ENCOUNTER
Signed Prescriptions:                        Disp   Refills    buprenorphine HCl-naloxone HCl (SUBOXONE) *30 each0        Sig: Use 0.5 film under the tongue 4 times per day for           pain, approximately every 6 hours. Max 2 films           per day. Fill and begin 4/16/2021.  15 day supply  Authorizing Provider: SUSANA PETTY    Reviewed [place]  April 16, 2021- no concerning fills.    Susana Petty APRN, RN CNP, FNP  [facility]  [facility] [place]

## 2021-04-16 NOTE — TELEPHONE ENCOUNTER
Hi Les-     I just sent in a script to increase the dosage of Suboxone. You will use 0.5 (one-half film) 4 times daily (about every 6 hours) for pain. I sent in for 30 films for 15 days to [facility]. You can fill and begin this today.      Susana GRANT, RN WILBER, JAQUELINP  [facility]  [facility] [place]    I have sent the above RetAPPst message to the patient.     Susana GRANT RN CNP, XUAN  [facility]      Signed Prescriptions:                        Disp   Refills    buprenorphine HCl-naloxone HCl (SUBOXONE) *30 each0        Sig: Use 0.5 film under the tongue 4 times per day for           pain, approximately every 6 hours. Max 2 films           per day. Fill and begin 4/16/2021.  15 day supply  Authorizing Provider: SUSANA PETTY    Reviewed [facility] April 16, 2021- no concerning fills.    Susana GRANT RN CNP, JAQUELINP  [facility]

## 2021-04-18 NOTE — PROGRESS NOTES
LOCUS Worksheet     Name: Jailene Breen MRN: [id number]    : 1965      Gender:  male    PMI:     Provider Name: [facility]   Provider NPI:   [id number]  Actual level of Care Provided:  4    Service(s) receiving or referred to:  Adult Day Treatment    Reason for Variance: n/a      Rating completed by: Yordy White MA, LMFT      I. Risk of Harm:   3      Moderate Risk of Harm    II. Functional Status:   3      Moderate Impairment    III. Co-Morbidity:   4      Major Co-Morbidity    IV - A. Recovery Environment - Level of Stress:   3      Moderately Stress Environment    IV - B. Recovery Environment - Level of Support:   2      Supportive Environment    V. Treatment and Recovery History:   3      Moderate to Equivocal Response to Treatment and Recovery Management    VI. Engagement and Recovery Project:   3      Limited Engagement and Recovery       21 Composite Score    Level of Care Recommendation:   20 to 22       Medically Monitored Non-Residential Services

## 2021-04-20 ENCOUNTER — TELEPHONE (OUTPATIENT)
Dept: BEHAVIORAL HEALTH | Facility: CLINIC | Age: 56
End: 2021-04-20

## 2021-04-20 ENCOUNTER — MYC REFILL (OUTPATIENT)
Dept: FAMILY MEDICINE | Facility: CLINIC | Age: 56
End: 2021-04-20

## 2021-04-20 DIAGNOSIS — F41.1 GAD (GENERALIZED ANXIETY DISORDER): ICD-10-CM

## 2021-04-20 NOTE — TELEPHONE ENCOUNTER
Writer called and left a message for Pt today requesting a return phone call to coordinate a start date in the program.  Pt discharged on 4/14 from [facility] and on his last day requested IOP so had been placed on the waiting list. Will await a return call.     Pt called back quickly.  He is interested in starting in the program as soon as Thursday.  Will get it set up.  He stated was feeling safe today but has had some thoughts of suicide over the past week since PHP discharge.  Will inform team  Of the admission.

## 2021-04-21 ENCOUNTER — VIRTUAL VISIT (OUTPATIENT)
Dept: PALLIATIVE MEDICINE | Facility: CLINIC | Age: 56
End: 2021-04-21
Payer: COMMERCIAL

## 2021-04-21 DIAGNOSIS — F11.20 UNCOMPLICATED OPIOID DEPENDENCE (H): ICD-10-CM

## 2021-04-21 DIAGNOSIS — M06.9 RHEUMATOID ARTHRITIS INVOLVING MULTIPLE JOINTS (H): ICD-10-CM

## 2021-04-21 DIAGNOSIS — M54.59 LUMBAR FACET JOINT PAIN: ICD-10-CM

## 2021-04-21 DIAGNOSIS — M51.369 DDD (DEGENERATIVE DISC DISEASE), LUMBAR: ICD-10-CM

## 2021-04-21 DIAGNOSIS — M54.41 CHRONIC BILATERAL LOW BACK PAIN WITH RIGHT-SIDED SCIATICA: ICD-10-CM

## 2021-04-21 DIAGNOSIS — M25.551 HIP PAIN, RIGHT: Primary | ICD-10-CM

## 2021-04-21 DIAGNOSIS — G89.4 CHRONIC PAIN SYNDROME: ICD-10-CM

## 2021-04-21 DIAGNOSIS — G89.29 CHRONIC BILATERAL LOW BACK PAIN WITH RIGHT-SIDED SCIATICA: ICD-10-CM

## 2021-04-21 DIAGNOSIS — M45.7 ANKYLOSING SPONDYLITIS OF LUMBOSACRAL REGION (H): ICD-10-CM

## 2021-04-21 PROCEDURE — 99215 OFFICE O/P EST HI 40 MIN: CPT | Mod: 95 | Performed by: NURSE PRACTITIONER

## 2021-04-21 RX ORDER — CLONAZEPAM 1 MG/1
TABLET ORAL
Qty: 30 TABLET | Refills: 0 | Status: SHIPPED | OUTPATIENT
Start: 2021-04-21 | End: 2021-05-21

## 2021-04-21 ASSESSMENT — PAIN SCALES - GENERAL: PAINLEVEL: EXTREME PAIN (9)

## 2021-04-21 NOTE — PROGRESS NOTES
Jamison is a 55 year old who is being evaluated via a billable video visit.      How would you like to obtain your AVS? MyChart  If the video visit is dropped, the invitation should be resent by: Text to cell phone: [phone]  Will anyone else be joining your video visit? Charley Krueger MA          Video-Visit Details    Type of service:  Video Visit  Video Start Time: 10:19 AM  Video End Time:10:54 AM    Originating Location (pt. Location): Home    Distant Location (provider location):  [facility]     Platform used for Video Visit: [facility]    4/21/2021        Chief complaint:   - lower  back pain radiates to the groin and down the right leg   -right hip pain  -Bilateral knee pain  -hand pain are also sore      Interval history:   Jailene Breen is a 55 year old male is known to me for   Lumbar spondylosis, pain is worse with extension and rotation indicating a facetogenic component to pain  Lumbar degenerative disc disease  SI joint pain  Ankylosing spondylitis  Myofascial pain  Chronic pain syndrome  PMHx includes: Panic attacks, back pain, arthritis, anxiety, ankylosing spondylitis  PSHx includes: Right knee surgery ×2, left foot surgery right knee arthroscopy        Recommendations/plan at the last visit on 4/6/2021 included:  1. Recommended to increase activity while pacing himself  2. Physical Therapy:  Let's do this once you are done with the PHP program  3. Clinical Health Psychologist: keep working with your psychiatrist and therapist  4. Diagnostic Studies:  Has lumbar MRI schedule tomorrow   5. Medication Management:    1. continue chlorzoxazone 500mg three times daily as needed for muscle spasms  2. INCREASE Suboxone 8/2mg films, use 0.5 film under the tongue every 8 hours. Fill/begin 4/6 to last 10 days  3. Consider medical cannabis, I have a note out to Dr. Sage  4. Consider restarting Lyrica in the future  6. Further  "procedures recommended: none at present  7. Recommendations to PCP: see above  8. Follow up: 2-3 weeks via video due to COVID-19 viral threat. Please call [phone] to make your follow-up appointment with me.               Since his last visit, Jailene Breen reports:    Interval history April 21, 2021  -he had COVID injection on Friday 4/16/2021 this was his 2nd injection. [place] crummy over the weekend.   -he is still dealing with the right hip pain that is the worst pain  -he is having left hip pain as well   -bilateral knee pain as well. It is hard for him to do stairs due to pain.   -he feels that the increase in Suboxone to 4/1mg QID has been a \"titch\" helpful.   -he does not find Voltaren gel or Aspercreme with 4% lidocaine helpful for his knee pain.  -he has finished the partial hospitalization program.  -he will be starting the adult day program for psychiatric care.           Interval history April 6, 2021  -Les states he has been feeling like \"hell.\"   -his low back is bothering him and it will radiate into his right groin and down his right leg to the foot.   -he is walking with a cane.   - He was hospitalized twice recently for suicidal ideation. Mood is still low. Denies SI   -he is attending the Partial Hospitalization program. This is all on Zoom. Lasts for 6 hours Monday through Friday.   -he is also working with his psychiatrist and psychologist in addition to that.  -he tries to walk a few times per week, he thinks this is about a half a mile or so.  -he also notes he tries not to go out much to avoid injury or over-activity.   -he is back on Enbrel and methotrexate. He notes that this is a bit helpful for his joint pain.   -he does not get any relief from the Subxone. He states it \"doesn't do anything.\" he relates he has been on OxyContin 20mg TID in the past. He is frustrated that I switched him off of Oxycodone/OxyContin when he was in the hospital for SI. We discussed again how being on " "full  Mu-agonist opiates are not a good option for him. Additionally, when we began working together on 3/31/2017, he was NOT on daily opiates. In the time we have worked together, he has gone from being on no opiates on a daily basis to #15 tabs/month of oxycodone to slowly increasing to Oxycodone 35mg/day in divided dosing using OxyContin 10mg BID and oxycodone 5mg TID PRN which is 52mg OME (oral morphine equivalent, also known as morphine milligram equivalent-MME). In that time, his functional level and his mood has deteriorated despite increasing opiate dosages. -  -discussed I am open to continuing Suboxone and recommend increasing to 4/1mg every 8 hours, a 50% increase. Patient stated that \"that isn't going to be high enough.\" when I asked what he felt might be helpful, he stated he felt the dosage should be tripled. Explained that doing so is not a safe choice. Additionally, discussed that Suboxone hits the peak pain relief (plateaus) at between 16-18mg/day as the mu-receptors are all flooded.   He is concerned re: having upcoming surgery for a hiatal hernia in May. Reviewed that he can be on short-acting opiates as his surgeon deems necessary, but that he would be tapered off of them and continue on Suboxone with me long term.       Interval history February 16, 2021  -He has had more pain since the right  RFA done 2/1/2021.  -he slipped taking his garbage out last week, he tweaked his back, did not fall.    -he is off of his RA meds as he has an upper respiratory infection  -the extreme cold makes his arthritis pain worse  -he may be having a surgery to fix a hiatal hernia  -flexeril caused urinary retention, this was better when he stopped taking it.       Interval history 11/11/2020  -he has been having issues with emptying his bladder and he has had 2 Carrasco catheters in the near future  -he is trying to get in with a [place] [facility]rologist.  -we discussed how opiate medication can cause urinary " hesitancy  -he feels like the urinary symptoms began a couple of weeks after he began lexapro.  -he states he recently found out that his biological dad has had issues with his bladder.   -he had 2nd lumbar medial branch block done today with Dr. Ashlyn Gamble, we are working toward lumbar RFA..       Interval history 2020  -He has been sick 10 days ago,  had been on antibiotics and was not able to take his RA meds. He then felt better and then is now feeling worse again. Had been tested for COVID-19  -how his whole house is not feeling well.   -he is off of his RA meds, and so his pain is worse because he doesn't feel well, he has a really deep cough.  -having 2nd LMBB next week on the left side, then plan to do bilateral lumbar RFA  -he can't sleep due to body aches and then his back pain is worse.   -recently seen by psychiatry for first time this week, placed on escitalopram for depression/anxiety and hydroxyzine for panic/anxiety PRN, Les tells me that the psychiatrist wants him to stop using the clonazepam.       Interval history 6/10/2020  -having bilateral low back pain that radiates into the groin at times  -having multiple joint pain from RA, his RA flare is better as he is back on the Enbrel now  -he would love to get a new bed as he has issues getting comfortable in bed.   -he is having more left sided low back pain, worse with lumbar extension and extension and rotation. He is interested in pursing possible left sided lumbar RFA.      Interval 2020  -his 28 year old niece overdosed and  last Saturday, she had a 12,5 and 1 year old.   -he is going to her  today  -he did get his Enbrel yesterday  -he is going to see a new rheumatologist in July with the [facility]    -he says his pain is now a bit better  -he has multiple joint pain from RA and chronic low back pain        At this point, the patient's participation with our multidisciplinary team includes:  The patient has been  "compliant with the program.    PT - has seen Cyndee White, none recently  Health Psych - worked with  Padilla Keene, last on 11/6/2018.  Working with Jonna Farrell PhD and been seen >8 times since April 2020         Pain scores:    Pain intensity on average is 8-9 on a scale of 0-10.    Range is 6-10/10.   Pain right now is 8/10.   Pain is described as \"shooting, throbbing, penetrating, burning, and stabbing.\"  Pain is constant in nature    Current pain relevant medications:      -nortriptyline 50mg at bedtime (helpful)  -Enbrel 50mg/mL   -ibuprofen 800mg TID PRN (using 3 times daily-helpful)  -Tylenol 500mg using 1000mg TID (unsure if helpful)  -Maxalt 10mg PRN migraine (helpful for migraine--he does have visual aura)  -Suboxone 8/2mg using 4mg/1mg Q 6 hours max 2 full films per day (\"helps a titch.\")  -naloxone nasal spray PRN for accidental opiate overdose        Other pertinent medications:   -clonazepam 1mg tab, may take 0.5-1mg BID PRN anxiety (helpful, has been using infrequently)  -Escitalopram 10mg (just started this with psychiatry 3 days ago)  -hydroxyzine 25mg once daily as needed for anxiety/panic (just prescribed by psychiatry 3 days ago)      Previous Medications:   OPIATES: Oxycodone (helpful), Fentanyl (100mcg/hr patch helpful), hydrocodone (itching), Tramadol (not helpful)   NSAIDS: ibuprofen (not helpful), Aleve (not helpful)  MUSCLE RELAXANTS: methocarbamol (somewhat helpful), Flexeril (somewhat helpful but caused severe urinary retention requiring catheterization), tizanidine (unsure if helpful), metaxalone (unsure), SOMA (helpful)  ANTI-MIGRAINE MEDS: Maxalt (helpful for migraine), Imitrex injection (somewhat helpful)  ANTI-DEPRESSANTS: Prozac (helpful), Cymbalta (felt weird on it), nortriptyline (unsure if helpful), Buspar (unsure), Remeron (blacked out), bupropion (not helpful for smoking cessation)  SLEEP AIDS: Ambien (helpful)  ANTI-CONVULSANTS: gabapentin (felt odd on this " medication, unsure of dose), Lyrica (not helpful for 4 months, unsure of dose), Topamax (not helpful, he felt weird on it)   TOPICALS: Lidocaine patches (not helpful), Voltaren gel (not helpful)  Other meds: Tylenol (unsure if helpful)        Other treatments have included:   Jailene Breen has been seen at a pain clinic in the past.  [facility]  PT: tried, not helpful  Chiropractic: tried, not helpful  Acupuncture: none  TENs Unit: tried, helpful         Injections:     -has had injections at outside clinics  -has had epidural injections for low back pain (one was helpful)  -he has had lumbar medial branch blocks at [facility] and he was going to have lumbar radiofrequency ablation (did not do this due to cost)  -4/3/2017 right LMBBs with Dr. Ashlyn Gamble (helpful)  -4/26/2017 right LMBBs with Dr. Ashlyn Gamble (helpful)  -5/31/2017 right L3, L4, L5 RFA with Dr. Ashlyn Gamble (good relief for over 6 months)  -3/15/2018 right L5 transforaminal MAKAYLA with Dr. Ashlyn Gamble (not helpful)  -5/10/2018 trigger point injections with Dr. Ashlyn Gamble(somewhat helpful).  -7/12/2018 Right L3, L4, L5 RFA with Dr. Ashlyn Gamble (helpful).  -9/20/2018 Left piriformis injections, bilateral gluteal trigger point with Dr. Gamble (helpful).  -1/31/2019 right L2-3, L3-4 and L4-5 facet joint injections with Dr. Ashlyn Gamble (somewhat helpful)  4/4/2019  right L3, L4, L5/sacral ala lumbar radiofrequency ablation with Dr. Gamble (helpful over right side)  6/28/2019 Bilateral facet joint injections at l4-5, L5-S1 with Dr. Holley (only helpful for 7 days)  -2/12/2020 right E0-L6-P8-sacral ALA RFA done with Dr. Ashlyn Gamble (helpful)  -8/26/2020 left L3-5 lumbar medial branch blocks #1 with Dr. Ashlyn Gamble (very helpful)  -11/11/2020 left L3-5 lumbar medial branch blocks #2 with Dr. Ashlyn Gamble (too soon to tell, done today helpful)   -2/1/2021 right lumbar RFA L4-5 and L5-S1 with Dr. Ashlyn Gamble   (this  "\"helped a little bit\" and then he tweaked his back about 2 weeks after it was done and it wasn't helpful)          THE 4 A's OF OPIOID MAINTENANCE ANALGESIA    Analgesia: Suboxone 4/1mg 4 times per day is \"slightly helpful\"    Activity: very little activity. Goes on a short walk several times per week    Adverse effects: none    Adherence to Rx protocol: yes          Side Effects: no side effect  Patient is using the medication as prescribed: YES    Medications:  Current Outpatient Medications   Medication Sig Dispense Refill     acetaminophen (TYLENOL) 500 MG tablet Take 1,000 mg by mouth every 6 hours as needed for mild pain (headache)       ARIPiprazole (ABILIFY) 2 MG tablet Take 1 tablet (2 mg) by mouth daily 30 tablet 0     ARIPiprazole (ABILIFY) 5 MG tablet Take 1 tablet (5 mg) by mouth daily 30 tablet 0     atenolol (TENORMIN) 25 MG tablet Take 1 tablet (25 mg) by mouth daily 90 tablet 1     buprenorphine HCl-naloxone HCl (SUBOXONE) 8-2 MG per film Use 0.5 film under the tongue 4 times per day for pain, approximately every 6 hours. Max 2 films per day. Fill and begin 4/16/2021.  15 day supply 30 each 0     chlorzoxazone (PARAFON FORTE) 500 MG tablet Take 1 tablet (500 mg) by mouth 3 times daily as needed for muscle spasms 45 tablet 1     cholecalciferol (VITAMIN D3) 125 mcg (5000 units) capsule Take 125 mcg by mouth daily       clonazePAM (KLONOPIN) 1 MG tablet TAKE ONE-HALF - 1 TABLET BY MOUTH ONCE DAILY AS NEEDED FOR ANXIETY 30 tablet 0     diclofenac (VOLTAREN) 1 % topical gel Apply 2 g topically 4 times daily 50 g 0     etanercept (ENBREL SURECLICK) 50 MG/ML autoinjector Inject 50 mg Subcutaneous once a week (Patient taking differently: Inject 50 mg Subcutaneous once a week Every Tuesday) 50 mg 2     fish oil-omega-3 fatty acids 1000 MG capsule Take 1 g by mouth daily       FLUoxetine (PROZAC) 40 MG capsule Take 1 capsule (40 mg) by mouth daily 30 capsule 3     folic acid (FOLVITE) 1 MG tablet Take 5 " tablets (5 mg) by mouth daily 150 tablet 3     Lidocaine (LIDOCARE) 4 % Patch Place 1-3 patches onto the skin every 24 hours To prevent lidocaine toxicity, patient should be patch free for 12 hrs daily. 90 patch 3     lidocaine (XYLOCAINE) 5 % external ointment Apply topically every 4 hours as needed for moderate pain 50 g 0     liothyronine (CYTOMEL) 25 MCG tablet Take 1 tablet (25 mcg) by mouth daily 30 tablet 3     menthol, Topical Analgesic, 2.5% (BENGAY VANISHIN SCENT) 2.5 % GEL topical gel Apply topically every 6 hours as needed for moderate pain 45 g 3     methotrexate sodium 2.5 MG TABS Take 4 tablets (10 mg) by mouth every 7 days (Patient taking differently: Take 4 tablets by mouth every 7 days Every tuesday) 48 tablet 0     multivitamin (CENTRUM SILVER) tablet Take 1 tablet by mouth daily       nicotine polacrilex (NICORETTE) 4 MG gum PLACE 1 PIECE INSIDE THE CHEEK AS NEEDED FOR SMOKING CESSATION 200 each 3     omeprazole (PRILOSEC) 20 MG DR capsule Take 20 mg by mouth daily        prazosin (MINIPRESS) 1 MG capsule Take 1 capsule (1 mg) by mouth At Bedtime 30 capsule 3     traZODone (DESYREL) 50 MG tablet Take 0.5 tablets (25 mg) by mouth At Bedtime . With additional 0.25 tablet (12.5mg) as needed daily for anxiety. (Patient taking differently: Take 50 mg by mouth At Bedtime ) 30 tablet 1       Medical History: any changes in medical history since they were last seen? No    Social History:    Home situation: , has 2 grown children  Occupation/Schooling: currently unemployed, worked as a  (unemployed since 3/1/2017). 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  Uses nicotine gum  If yes, was cessation counseling offered?  None needed                Physical Exam:     Vital signs: There were no  vitals taken for this visit.     Behavioral observations:  Awake, alert. Cooperative.   Pulm: respirations easy and unlabored. Able to speak in full sentences without SOB or cough noted.            [place] Pres[place]:  [place]ed [place]  4/21/2021- no concerning fills.  Susana GRANT, RN CNP, FNP  [facility]        DIRE Score for selecting candidates for long term opioid analgesia for chronic pain:  Diagnosis  2  Intractablility  2  Risk    Psychological health  1    Chemical health  2    Reliability  2    Social support  2  Efficacy  1    Total DIRE Score = 12. Note that  7-13 predicts poor outcome (compliance and efficacy) from opioid prescribing; 14-21 predicts good outcome (compliance and efficacy)  from opioid prescribing.      Assessment:    1. Right hip pain  2. Chronic bilateral low back pain with right sided sciatica  3. Lumbar facet joint pain    4. DDD, lumbar  5. Ankylosing spondylitis of lumbosacral region  6. RA involving multiple joints  7. Uncomplicated opiate dependence  8. Chronic pain syndrome    9. Encounter for long-term opiate analgesic use  10. PMHx includes: Panic attacks, back pain, arthritis, anxiety, ankylosing spondylitis  11. PSHx includes: Right knee surgery ×2, left foot surgery right knee arthroscopy      Plan:    1. Recommended to increase activity while pacing himself  2. Physical Therapy:  Let's do this once you are done with the PHP program  3. Clinical Health Psychologist: keep working with your psychiatrist and therapist  4. Diagnostic Studies:  None  5. Medication Management:    1. continue chlorzoxazone 500mg three times daily as needed for muscle spasms  2. Continue Suboxone 8/2mg films, use 0.5 film under the tongue every 6 hours.   3. Patient certified for medical cannabis today  4. Consider restarting Lyrica in the future  6. Further procedures recommended: none at present  7. Recommendations to PCP: see above  8. Follow  up: 4-6 weeks via video due to COVID-19 viral threat. Please call [phone] to make your follow-up appointment with me.       BILLING TIME DOCUMENTATION:   The total TIME spent on this patient on the day of the appointment was 46 minutes.      TOTAL TIME includes:   Time spent preparing to see the patient: 3 minutes (reviewing records and tests)  Time spend face to face with the patient: 35 minutes  Time spent ordering tests, medications, procedures and referrals: 0 minutes  Time spent Referring and communicating with other healthcare professionals: 0 minutes  Documenting clinical information in Epic: 8 minutes          Susana GRANT, RN CNP, FNP  [facility]  [facility] [place]

## 2021-04-21 NOTE — TELEPHONE ENCOUNTER
Routing refill request to provider for review/approval because:  Drug not on the [facility] refill protocol     Winifred Peguero RN

## 2021-04-21 NOTE — PATIENT INSTRUCTIONS
Plan:    1. Recommended to increase activity while pacing himself  2. Physical Therapy:  Let's do this once you are done with the PHP program  3. Clinical Health Psychologist: keep working with your psychiatrist and therapist  4. Diagnostic Studies:  None  5. Medication Management:    1. continue chlorzoxazone 500mg three times daily as needed for muscle spasms  2. Continue Suboxone 8/2mg films, use 0.5 film under the tongue every 6 hours.   3. Patient certified for medical cannabis today  4. Consider restarting Lyrica in the future  6. Further procedures recommended: none at present  7. Recommendations to PCP: see above  8. Follow up: 4-6 weeks via video due to COVID-19 viral threat. Please call [phone] to make your follow-up appointment with me.    ----------------------------------------------------------------  Clinic Number:  [phone]     Call with any questions about your care and for scheduling assistance.     Calls are returned Monday through Friday between 8 AM and 4:30 PM. We usually get back to you within 2 business days depending on the issue/request.    If we are prescribing your medications:    For opioid medication refills, call the clinic or send a OpenZine message 7 days in advance.  Please include:    Name of requested medication    Name of the pharmacy.    For non-opioid medications, call your pharmacy directly to request a refill. Please allow 3-4 days to be processed.     Per [place] State Law:    All controlled substance prescriptions must be filled within 30 days of being written.      For those controlled substances allowing refills, pickup must occur within 30 days of last fill.      We believe regular attendance is key to your success in our program!      Any time you are unable to keep your appointment we ask that you call us at least 24 hours in advance to cancel.This will allow us to offer the appointment time to another patient.     Multiple missed appointments may lead to dismissal  from the clinic.

## 2021-04-22 ENCOUNTER — HOSPITAL ENCOUNTER (OUTPATIENT)
Dept: BEHAVIORAL HEALTH | Facility: CLINIC | Age: 56
End: 2021-04-22
Attending: PSYCHIATRY & NEUROLOGY
Payer: COMMERCIAL

## 2021-04-22 PROCEDURE — 90853 GROUP PSYCHOTHERAPY: CPT | Mod: 95

## 2021-04-22 PROCEDURE — 90853 GROUP PSYCHOTHERAPY: CPT | Mod: GT | Performed by: PSYCHOLOGIST

## 2021-04-22 PROCEDURE — 90853 GROUP PSYCHOTHERAPY: CPT | Mod: 95 | Performed by: SOCIAL WORKER

## 2021-04-22 NOTE — GROUP NOTE
Process Group Note  Telemedicine Visit: The patient's condition can be safely assessed and treated via synchronous audio and visual telemedicine encounter.    Reason for Telemedicine Visit: Patient has requested telehealth visit  Originating Site (Patient Location): Patient's home  Distant Site (Provider Location): [facility] Outpatient Setting: Adult Day Tx  Consent:  The patient/guardian has verbally consented to: the potential risks and benefits of telemedicine (video visit) versus in person care; bill my insurance or make self-payment for services provided; and responsibility for payment of non-covered services.   Mode of Communication:  Video Conference via [employer]  As the provider I attest to compliance with applicable laws and regulations related to telemedicine.    PATIENT'S NAME: Jailene Breen  MRN:   [id number]  :   1965  ACCT. NUMBER: [id number]  DATE OF SERVICE: 21  START TIME:  9:00 AM  END TIME:  9:50 AM  FACILITATOR: Josefina Beauchamp PsyD  TOPIC: MH Process Group    Diagnoses:    Major Depressive Disorder, recurrent, moderate  JEFF  Panic Disorder    Rheumatoid Arthritis    Fredo Hughes MD MD Orthopedics [address]  21   Phone: [phone]; Fax: [phone]      Aiden Resendez PA  Assigned PCP  2020   Namita Sharma MD Resident Student in organized health care education/training program 2020 End  20   Phone: [phone]; Fax: [phone]      Barbie Christie, PhD LP Psychologist Licensed Mental Health 2020 End  20   Phone: [phone]; Fax: [phone]      Comment: supervising      Susana Pike APRN CNP Referring Physician Nurse Practitioner 2020 End  20   Phone: [phone]; Fax: [phone]      Raghu Parada MD  Assigned Rheumatology Provider  10/23/2020   10/28/20            Chanelle Solo MD MD Psychiatry 2021 End  21   Phone: [phone]; Fax:  [phone]      Comment: Attending      Coffin, Richard Breyen, MD Resident Psychiatry 02/24/2021 End  2/24/21   Phone: [phone]; Fax: [phone]            [facility] Mental Health Day Treatment  TRACK: 2A    NUMBER OF PARTICIPANTS: 7          Data:    Session content: At the start of this group, patients were invited to check in by identifying themselves, describing their current emotional status, and identifying issues to address in this group.   Area(s) of treatment focus addressed in this session included Symptom Management, Personal Safety and Community Resources/Discharge Planning.  Client reported being safe today.  Reported mood is anxious.    Goal for today is to attend group therapy online.  The client talked to the group about how his medications have changed and he was taken off his opiods for pain. He reported poor sleep last night and that they gave him Suboxone and Trazodone for sleep, but he only got 2-3 hours of good sleep. He reported that he applied for his medical marijuana card and doesn't know how long it takes to get approved so he can get that medication for pain management. The group suggested he keep in contact with the pain clinic about his sleep and pain issues.       Therapeutic Interventions/Treatment Strategies:  Psychotherapist offered support, feedback and validation and reinforced use of skills. Treatment modalities used include Cognitive Behavioral Therapy and Dialectical Behavioral Therapy. Interventions include Cognitive Restructuring:  Facilitated recognition of the connection between negative thoughts and negative core beliefs, Coping Skills: Reviewed patients current calming practices and discussed a more formal way of practicing and accessing skills, Relapse Prevention: Facilitated understanding of effective coping skills in response to triggers for substance use and Mindfulness: Facilitated discussion of when/how to use mindfulness skills to benefit  general health, mental health symptoms, and stressors.    Assessment:    Patient response:   Patient responded to session by listening, focusing on goals and accepting support    Possible barriers to participation / learning include: severity of symptoms    Health Issues:   Yes: Pain, Associated Psychological Distress       Substance Use Review:   Substance Use: No active concerns identified.    Mental Status/Behavioral Observations  Appearance:   Appropriate   Eye Contact:   Good   Psychomotor Behavior: Normal   Attitude:   Cooperative   Orientation:   All  Speech   Rate / Production: Normal    Volume:  Normal   Mood:    Anxious  Depressed  Sad   Affect:    Constricted   Thought Content:   Rumination  Thought Form:  Coherent  Logical     Insight:    Good     Plan:     Safety Plan: Recommended that patient call 911 or go to the local ED should there be a change in any of these risk factors.     Barriers to treatment: None identified    Patient Contracts (see media tab):  None    Substance Use: Provided encouragement towards sobriety     Continue or Discharge: Patient will continue in Adult Day Treatment (ADT)  as planned. Patient is likely to benefit from learning and using skills as they work toward the goals identified in their treatment plan.      Josefina Beauchamp PsyD  April 22, 2021  Elpidio Blake D,  Licensed Clinical Psychologist

## 2021-04-22 NOTE — GROUP NOTE
Telemedicine Visit: The patient's condition can be safely assessed and treated via synchronous audio and visual telemedicine encounter.      Reason for Telemedicine Visit: Services only offered telehealth    Originating Site (Patient Location): Patient's home    Distant Site (Provider Location): Provider Remote Setting    Consent:  The patient/guardian has verbally consented to: the potential risks and benefits of telemedicine (video visit) versus in person care; bill my insurance or make self-payment for services provided; and responsibility for payment of non-covered services.     Mode of Communication:  Video Conference via [employer]sCloud    As the provider I attest to compliance with applicable laws and regulations related to telemedicine.  Psychotherapy Group Note    PATIENT'S NAME: Jailene Breen  MRN:   [id number]  :   1965  ACCT. NUMBER: [id number]  DATE OF SERVICE: 21  START TIME: 10:00 AM  END TIME: 10:50 AM  FACILITATOR: Maryann Duggan LMFT  TOPIC:  EBP Group: Coping Skills  [facility] Mental Health Day Treatment  TRACK: 2A    NUMBER OF PARTICIPANTS: 5    Summary of Group / Topics Discussed:  Coping Skills: Relapse Planning: Patients discussed and increased understanding of how anticipating and planning for possible increased symptoms is a proactive way to reduce the likelihood of relapse.  Patients shared individualized factors that may lead to increased symptoms and decompensation in functioning.  Each patient developed a relapse prevention plan designed to help them recognize when they may have increasing symptoms requiring them to take action and notify their key supports and care team.      Patient Session Goals / Objectives:    Identify and understand what factors may contribute to symptom relapse.    Identify actions that may be taken to manage increased symptoms/stressors.    Create an individualized written relapse plan    Problem solve barriers to plan creation and  implementation    Share relapse plan with key support people        Patient Participation / Response:  Moderately participated, sharing some personal reflections / insights and adequately adequately received / provided feedback with other participants.    Demonstrated understanding of topics discussed through group discussion and participation, Expressed understanding of the relevance / importance of coping skills at distressing times in life and Demonstrated knowledge of when to consider using a variety of coping skills in daily life    Treatment Plan:  Patient has a current master individualized treatment plan.  See Epic treatment plan for more information.    ROSEMARY Hanna

## 2021-04-22 NOTE — GROUP NOTE
Psychotherapy Group Note    PATIENT'S NAME: Jailene Breen  MRN:   [id number]  :   1965  ACCT. NUMBER: [id number]  DATE OF SERVICE: 21  START TIME: 11:00 AM  END TIME: 11:50 AM  FACILITATOR: Liliya Oh LICSW  TOPIC: MH EBP Group: Behavioral Activation  [facility] Adult Mental Health Day Treatment  TRACK: 2A    NUMBER OF PARTICIPANTS: 5    Summary of Group / Topics Discussed:  Behavioral Activation: [place] A[place]: {Patients identified situations that prompt unwanted and unhelpful emotions / thoughts / behaviors.   Patients discussed how to problem solve by proactively using coping skills in potentially difficult situations. Components included describing the situation, brainstorming coping skills, imagining how scenario can/will unfold, rehearsing the action plan, and practicing relaxation to follow.  Patients practiced using these skills to reduce symptom distress and increase effective coping  behaviors.      Patient Session Goals / Objectives:    Identify difficult situation(s), and gain proficiency with alternative behaviors / skills to problem solve.    Increase confidence using coping skills through group practice in session.    Receive and provide feedback regarding skill development.    Apply coping skills in daily life situations.    Telemedicine Visit: The patient's condition can be safely assessed and treated via synchronous audio and visual telemedicine encounter.      Reason for Telemedicine Visit: Services only offered telehealth    Originating Site (Patient Location): Patient's home    Distant Site (Provider Location): Provider Remote Setting    Consent:  The patient/guardian has verbally consented to: the potential risks and benefits of telemedicine (video visit) versus in person care; bill my insurance or make self-payment for services provided; and responsibility for payment of non-covered services.     Mode of Communication:  Video Conference via [employer]    As  the provider I attest to compliance with applicable laws and regulations related to telemedicine.       Patient Participation / Response:  Fully participated with the group by sharing personal reflections / insights and openly received / provided feedback with other participants.    Expressed understanding of the relationship between behaviors, thoughts, and feelings    Treatment Plan:  Patient has an initial individualized treatment plan that was created as part of their diagnostic assessment / admission process.  A master individualized treatment plan is in the process of being developed with the patient and multi-disciplinary care team.    WALLY SnyderSW

## 2021-04-23 ENCOUNTER — VIRTUAL VISIT (OUTPATIENT)
Dept: PSYCHIATRY | Facility: CLINIC | Age: 56
End: 2021-04-23
Attending: PSYCHIATRY & NEUROLOGY
Payer: COMMERCIAL

## 2021-04-23 DIAGNOSIS — F32.A DEPRESSION, UNSPECIFIED DEPRESSION TYPE: ICD-10-CM

## 2021-04-23 DIAGNOSIS — F33.2 MAJOR DEPRESSIVE DISORDER, RECURRENT EPISODE, SEVERE WITH MIXED FEATURES (H): ICD-10-CM

## 2021-04-23 PROCEDURE — 99214 OFFICE O/P EST MOD 30 MIN: CPT | Mod: U7 | Performed by: STUDENT IN AN ORGANIZED HEALTH CARE EDUCATION/TRAINING PROGRAM

## 2021-04-23 RX ORDER — FLUOXETINE 40 MG/1
40 CAPSULE ORAL DAILY
Qty: 30 CAPSULE | Refills: 0 | Status: SHIPPED | OUTPATIENT
Start: 2021-04-23 | End: 2021-05-28

## 2021-04-23 ASSESSMENT — PAIN SCALES - GENERAL: PAINLEVEL: EXTREME PAIN (9)

## 2021-04-23 NOTE — PATIENT INSTRUCTIONS
Call clinic to schedule follow-up upon discharge from day treatment.        **For crisis resources, please see the information at the end of this document**     Patient Education      Thank you for coming to the [facility] [place] [facility].    Lab Testing:  If you had lab testing today and your results are reassuring or normal they will be mailed to you or sent through [employer] within 7 days. If the lab tests need quick action we will call you with the results. The phone number we will call with results is # [phone] (home) . If this is not the best number please call our clinic and change the number.    Medication Refills:  If you need any refills please call your pharmacy and they will contact us. Our fax number for refills is [phone]. Please allow three business for refill processing. If you need to  your refill at a new pharmacy, please contact the new pharmacy directly. The new pharmacy will help you get your medications transferred.     Scheduling:  If you have any concerns about today's visit or wish to schedule another appointment please call our office during normal business hours [phone] (8-5:00 M-F)    Contact Us:  Please call [phone] during business hours (8-5:00 M-F).  If after clinic hours, or on the weekend, please call  [phone].    Financial Assistance [phone]  [facility] Billing [phone]  Central Billing Office, [facility]: [phone]  [facility] Billing [phone]  Medical Records [phone]  [facility] Patient Bill of Rights [email or web address]       MENTAL HEALTH CRISIS NUMBERS:  For a medical emergency please call  911 or go to the nearest ER.     [facility]:   [facility] -[phone]   Crisis Residence [facility] Residence -[phone]   Walk-In Counseling Center [facility] -[phone]   COPE 24/7 [facility] Team  -[phone] (adults)/[phone] (child)  [facility]: [facility] - [phone]      [facility]:   [facility] - [phone]   Walk-in counseling [facility] - [phone]   Walk-in counseling [facility] - [phone]   Crisis Residence [facility] Residence - [phone]  Urgent Care Adult Mental Mfnnxw-[phone] mobile unit/ 24/7 crisis line    National Crisis Numbers:   National Suicide Prevention Lifeline: 2-620-538-TALK (544-442-7442)  Poison Control Center - 1-806.684.1188  Number 1 Products and Services/resources for a list of additional resources (SOS)  Trans Lifeline a hotline for transgender people 3-527-750-6931  The Tu Project a hotline for LGBT youth 2-470-819-8988  Crisis Text Line: For any crisis 24/7   To: 685418  see www.crisistextline.org  - IF MAKING A CALL FEELS TOO HARD, send a text!         Again thank you for choosing [facility] [place] [facility] and please let us know how we can best partner with you to improve you and your family's health.    You may be receiving a survey regarding this appointment. We would love to have your feedback, both positive and negative. The survey is done by an external company, so your answers are anonymous.

## 2021-04-23 NOTE — PROGRESS NOTES
"VIDEO VISIT  Jailene Breen is a 55 year old patient who is being evaluated via a billable video visit.      The patient has been notified of following:   \"This video visit will be conducted via a call between you and your physician/provider. We have found that certain health care needs can be provided without the need for an in-person physical exam. This service lets us provide the care you need with a video conversation. If a prescription is necessary we can send it directly to your pharmacy. If lab work is needed we can place an order for that and you can then stop by our lab to have the test done at a later time. Insurers are generally covering virtual visits as they would in-office visits so billing should not be different than normal.  If for some reason you do get billed incorrectly, you should contact the billing office to correct it and that number is in the AVS .    Video Conference to be completed via:  Kip    Patient has given verbal consent for video visit?:  Yes    Patient would prefer that any video invitations be sent by: Send to e-mail at: [email or web address]      How would patient like to obtain AVS?:  P[employer]    AVS SmartPhrase [PsychAVS] has been placed in 'Patient Instructions':  Yes  "

## 2021-04-23 NOTE — PROGRESS NOTES
"Video- Visit Details  Type of service:  video visit for medication management  Time of service:    Date:  04/23/2021    Video Start Time:  8:57 AM        Video End Time:  9:38 AM    Reason for video visit:  Patient unable to travel due to Covid-19  Originating Site (patient location):  [facility] [address]- [place]   Location- Patient's home  Distant Site (provider location):  Remote location  Mode of Communication:  Video Conference via [employer]  Consent:  Patient has given verbal consent for video visit?: Yes        [facility]  Psychiatry Clinic  PSYCHIATRIC PROGRESS NOTE     CARE TEAM:    PCP- Aiden Resendez   Rheumatology- Raghu Parada MD  [facility]- Susana Pike APRN; Santa Farrell PsyD    Jailene Breen is a 55 year old patient who prefers the name Les and uses pronouns he, him, his.     PERTINENT BACKGROUND        [most recent eval 09/21/20]     Psych critical item history includes suicidal ideation, SIB [cutting], mutiple psychotropic trials , severe med reaction, psych hosp (<3), SUBSTANCE USE: alcohol, substance use treatment  and Major Medical Problems (Rheumatoid Arthritis and Ankylosing Spondylitis)    INTERIM HISTORY        The patient reports good treatment adherence.  History was provided by the patient who was a good historian.     Since the last visit:   - Patient was hospitalized at [facility] for 2 occurrences after reporting increased suicidal ideation and lack of safety at home. He has also completed [facility] (4/14/21) following second hospitalization and plans to continue with IOP, started yesterday.  - Has been taking Abilify 2mg tablet (never increased to 4mg). Wanted to follow-up today if he should increase his dose to 5mg, as it was refilled at the pharmacy.  - Started day-treatment program yesterday, \"which was good\" because \"the week before I had nothing, which was tough\"  - His wife has been \"pushing\" and \"asking me a little " "more to come out with her to run errands.\"  - Not doing classes \"so really feel worthless right now.\"  - \"Just mostly tired\" and \"kind of busy.\"  - Some motivation and energy to do things, \"more than I did before but it's still not great.\" \"Motivation is getting there but is still really hard.\"  - Concentration, \"on and off\". \"Same thing\" with memory. Both are worse with stress.  - Financial stressors still prominent, \"medical bills piling up\" and \"fighting with disability (denial)\".  - Does believe that he would like to engage in activities, like photography, but finds pain very debilitating and \"why would I try to find the motivation if it's just going to be too painful.\"  - Anxiety hasn't been really high, some increase but not to the point of needing a clonazepam  - No suicidal thoughts \"or anything for the last few days.\"  - Tried medicinal marijuana yesterday. \"We'll see if it helps\". Should have taken it closer to bed \"because it just made me tired and relaxed\". Got \"even mix\" 50:50, and 60:40 THC:CBD.   - \"Julia just also really had a hard time\" with switch to Suboxone, as oxycodone \"is the only medication\" to have helped with pain. \"I feel like it (Suboxone) is just a placebo.\"    RECENT PSYCH ROS:   Depression: low energy, poor concentration /memory and feeling worthless and other symptoms as above  Elevated:  none  Psychosis:  none  Anxiety:  as above  Trauma Related:  none  Dysregulation:  none  Eating Disorder: no  Other: no    Adverse Effects:  denies  Pertinent Negative Symptoms: No suicidal ideation, violent ideation, aggression, psychosis, hallucinations, delusions, otto and hypomania    RECENT SUBSTANCE USE:     Alcohol- none since 2004, used to attend AA meetings prior to COVID.  Tobacco- denies  Caffeine- yes  Cannabis- denies     Opioids- as prescribed           Narcan Kit- prescribed   Other illicit drugs- none    FAMILY and SOCIAL HISTORY   [1ea, 1ea]           [per patient report]        " "    FAMILY HX:  Mom - depression (since  when sister )    SOCIAL HX:  Financial/ Work- currently attending college at [employer], is hoping to start school at [employer] for a degree in psychology after being unable to work due to diagnosis of rheumatoid arthritis and ankylosing spondylitis  Partner/ -  in .  Children- 23 and 25 year old kids      Living situation- [place], house with wife and 2 kids      Social/ Spiritual Support- Talks to  every two weeks for lunch, but \"doesn't really have friends\". Family is supportive.      PSYCH and SUBSTANCE USE Critical Summary Points since  - started escitalopram  November - increased escitalopram 20mg daily  February - stopped hydroxyzine; started trazodone 50mg at bedtime and 25mg daily as needed for anxiety   - Patient hospitalized started on Abilify, titrated to 5mg daily; escitalopram switched to fluoxetine 40mg daily; started prazosin 1mg at bedtime. Pain management switched oxycodone to Suboxone and started medical marijuana.    MEDICAL HISTORY  and ALLERGY     ALLERGIES: Mirtazapine, Hydrocodone, Ms contin [morphine], and Remeron soltab     Patient Active Problem List   Diagnosis     CARDIOVASCULAR SCREENING; LDL GOAL LESS THAN 160     Anxiety     Overweight (BMI 25.0-29.9)     Back pain     Tear meniscus knee, right, initial encounter     Rheumatoid arthritis involving multiple sites, unspecified rheumatoid factor presence     Chronic pain     Right-sided thoracic back pain, unspecified chronicity     JEFF (generalized anxiety disorder)     Panic attack     Syncope, unspecified syncope type     Ankylosing spondylitis (H)     Tobacco use disorder     Moderate major depression (H)     Immunocompromised (H)     Essential hypertension     Suicidal ideation     Insomnia due to other mental disorder     Suicide ideation     Depression, unspecified depression type     Major depressive disorder, " recurrent episode, severe with mixed features (H)         MEDICAL REVIEW OF SYSTEMS     Contraception- none    A comprehensive review of systems was performed and is negative other than noted in the HPI.    MEDICATIONS          *Trazodone initiated 2/25/2021.*    Current Outpatient Medications   Medication Sig Dispense Refill     acetaminophen (TYLENOL) 500 MG tablet Take 1,000 mg by mouth every 6 hours as needed for mild pain (headache)       ARIPiprazole (ABILIFY) 5 MG tablet Take 1 tablet (5 mg) by mouth daily 30 tablet 0     atenolol (TENORMIN) 25 MG tablet Take 1 tablet (25 mg) by mouth daily 90 tablet 1     buprenorphine HCl-naloxone HCl (SUBOXONE) 8-2 MG per film Use 0.5 film under the tongue 4 times per day for pain, approximately every 6 hours. Max 2 films per day. Fill and begin 4/16/2021.  15 day supply 30 each 0     chlorzoxazone (PARAFON FORTE) 500 MG tablet Take 1 tablet (500 mg) by mouth 3 times daily as needed for muscle spasms 45 tablet 1     cholecalciferol (VITAMIN D3) 125 mcg (5000 units) capsule Take 125 mcg by mouth daily       clonazePAM (KLONOPIN) 1 MG tablet TAKE ONE-HALF - 1 TABLET BY MOUTH ONCE DAILY AS NEEDED FOR ANXIETY 30 tablet 0     diclofenac (VOLTAREN) 1 % topical gel Apply 2 g topically 4 times daily 50 g 0     etanercept (ENBREL SURECLICK) 50 MG/ML autoinjector Inject 50 mg Subcutaneous once a week (Patient taking differently: Inject 50 mg Subcutaneous once a week Every Tuesday) 50 mg 2     fish oil-omega-3 fatty acids 1000 MG capsule Take 1 g by mouth daily       FLUoxetine (PROZAC) 40 MG capsule Take 1 capsule (40 mg) by mouth daily 30 capsule 3     folic acid (FOLVITE) 1 MG tablet Take 5 tablets (5 mg) by mouth daily 150 tablet 3     Lidocaine (LIDOCARE) 4 % Patch Place 1-3 patches onto the skin every 24 hours To prevent lidocaine toxicity, patient should be patch free for 12 hrs daily. 90 patch 3     lidocaine (XYLOCAINE) 5 % external ointment Apply topically every 4 hours as  needed for moderate pain 50 g 0     liothyronine (CYTOMEL) 25 MCG tablet Take 1 tablet (25 mcg) by mouth daily 30 tablet 3     medical cannabis (Patient's own supply) See Admin Instructions (The purpose of this order is to document that the patient reports taking medical cannabis.  This is not a prescription, and is not used to certify that the patient has a qualifying medical condition.)       menthol, Topical Analgesic, 2.5% (BENGAY VANISHIN SCENT) 2.5 % GEL topical gel Apply topically every 6 hours as needed for moderate pain 45 g 3     methotrexate sodium 2.5 MG TABS Take 4 tablets (10 mg) by mouth every 7 days (Patient taking differently: Take 4 tablets by mouth every 7 days Every tuesday) 48 tablet 0     multivitamin (CENTRUM SILVER) tablet Take 1 tablet by mouth daily       nicotine polacrilex (NICORETTE) 4 MG gum PLACE 1 PIECE INSIDE THE CHEEK AS NEEDED FOR SMOKING CESSATION 200 each 3     omeprazole (PRILOSEC) 20 MG DR capsule Take 20 mg by mouth daily        prazosin (MINIPRESS) 1 MG capsule Take 1 capsule (1 mg) by mouth At Bedtime 30 capsule 3     traZODone (DESYREL) 50 MG tablet Take 0.5 tablets (25 mg) by mouth At Bedtime . With additional 0.25 tablet (12.5mg) as needed daily for anxiety. (Patient taking differently: Take 50 mg by mouth At Bedtime ) 30 tablet 1     ARIPiprazole (ABILIFY) 2 MG tablet Take 1 tablet (2 mg) by mouth daily 30 tablet 0     VITALS                     There were no vitals taken for this visit.   MENTAL STATUS EXAM     [9, 14 cog gs]     Alertness: alert  and oriented  Appearance: adequately groomed  Behavior/Demeanor: cooperative, with good  eye contact   Speech: regular rate and rhythm  Language: intact  Psychomotor: normal or unremarkable  Mood: depressed   Affect: appropriate; congruent to: mood- yes, content- yes  Thought Process/Associations: unremarkable  Thought Content:  Reports none;  Denies suicidal ideation, violent ideation and delusions   Perception:  Reports  none;  Denies auditory hallucinations and visual hallucinations  Insight: good  Judgment: good  Cognition: (6) oriented: time, person, and place  attention span: intact  concentration: intact  recent memory: intact  remote memory: intact  fund of knowledge: appropriate  Gait and Station: N/A (telehealth)    LABS and DATA     PHQ9 TODAY = not completed due to telehealth  PHQ 12/15/2020 3/18/2021 3/30/2021   PHQ-9 Total Score 15 20 16   Q9: Thoughts of better off dead/self-harm past 2 weeks Not at all More than half the days Several days   F/U: Thoughts of suicide or self-harm - - Yes   F/U: Self harm-plan - - No   F/U: Self-harm action - - No   F/U: Safety concerns - - No       Recent Labs   Lab Test 03/21/21  0808 03/17/21  0748 09/10/20  1237   CR 0.92 1.12 0.97   GFRESTIMATED >90 74 89     Recent Labs   Lab Test 03/21/21  0808 03/17/21  0748 09/10/20  1237 10/07/16  0948 10/07/16  0948   AST 20 17 27   < > 15   ALT 34 30 50   < > 25   ALKPHOS 100 91  --   --  80    < > = values in this interval not displayed.       PSYCHOTROPIC DRUG INTERACTIONS     Increased risk of QTc prolongation: Flexeril, trazodone, fluoxetine, aripiprazole, Suboxone  Increased risk of serotonin syndrome: Flexeril, escitalopram, oxycodone, trazodone  Increased risk of CNS/respiratory depression: Suboxone, trazodone, Klonopin, aripiprazole    MANAGEMENT:  Monitoring for adverse effects and patient is aware of risks    RISK STATEMENT for SAFETY     Jailene Breen does not appear to pose an immanent risk to self or others.    DIAGNOSES                       Major Depressive Disorder, recurrent, severe, without psychotic features  Generalized Anxiety Disorder, with panic    ASSESSMENT                       Today, patient presents with improving symptoms of depression, since hospitalization and no suicidal ideation. He was hospitalized twice and discharged to [facility] and multiple medication changes were made. Yesterday, he started IOP and today we  are briefly checking in. He reports overall symptoms improving but still not at baseline. He did start aripiprazole but never increased dose, as recommended by psychiatrist in [facility]. Today, recommended that he increase to prescribed/filled dose of 5mg. Reviewed possible adverse effects and benefits. Informed that he should notify provider in [facility] that increase was made today, rather than when previous documentation suggested. He will follow-up with IOP provider and call to schedule appointment in clinic when discharging from that program. Refill provided.      [place] was [place] today:  Indicates no medication misuse .    PLAN                                                                                   1) Medications  - Continue fluoxetine 40mg daily   - Increase aripiprazole 5mg daily (previously was taking aripiprazole 2mg daily)  - Continue prazosin 1mg at bedtime   - Continue trazodone 50mg at bedtime     - clonazepam 1mg by PCP    2) Therapy-  Continue with couples therapy.    3) Next Due   Labs- as needed  EKG- consider repeat EKG given multiple medications with risk of EKG prolongation, encouraged patient to follow-up with PCP.  Rating scales- serial PHQ9s    4) Referrals  No Referrals needed    5) RTC: follow-up with IOP provider; schedule follow-up in clinic upon discharge.    6) Crisis Numbers:   Provided routinely in AVS     After hours:  [phone]      TREATMENT RISK STATEMENT:  The risks, benefits, alternatives and potential adverse effects have been discussed and are understood by the pt. The pt understands the risks of using street drugs or alcohol. There are no medical contraindications, the pt agrees to treatment with the ability to do so. The pt knows to call the clinic for any problems or to access emergency care if needed.  Medical and substance use concerns are documented above.  Psychotropic drug interaction check was done, including changes made today.    PROVIDER: Slim Sage,  MD    Patient was not staffed via video.  Supervisor is Dr. Solo who will sign the note.  I did not see this patient directly. I have reviewed the documentation and I agree with the resident's plan of care.     Chanelle Solo MD

## 2021-04-26 ENCOUNTER — HOSPITAL ENCOUNTER (OUTPATIENT)
Dept: BEHAVIORAL HEALTH | Facility: CLINIC | Age: 56
End: 2021-04-26
Attending: PSYCHIATRY & NEUROLOGY
Payer: COMMERCIAL

## 2021-04-26 PROCEDURE — 90853 GROUP PSYCHOTHERAPY: CPT | Mod: GT

## 2021-04-26 PROCEDURE — 90853 GROUP PSYCHOTHERAPY: CPT | Mod: 95 | Performed by: PSYCHOLOGIST

## 2021-04-26 NOTE — GROUP NOTE
Psychotherapy Group Note    PATIENT'S NAME: Jailene Breen  MRN:   [id number]  :   1965  ACCT. NUMBER: [id number]  DATE OF SERVICE: 21  START TIME: 11:00 AM  END TIME: 11:50 AM  FACILITATOR: Kiara Daugherty LICSW  TOPIC: MH EBP Group: Behavioral Activation  [facility] Adult Mental Health Day Treatment  TRACK: 2A    Telemedicine Visit: The patient's condition can be safely assessed and treated via synchronous audio and visual telemedicine encounter.      Reason for Telemedicine Visit: Services only offered telehealth    Originating Site (Patient Location): Patient's home    Distant Site (Provider Location): [facility]    Consent:  The patient/guardian has verbally consented to: the potential risks and benefits of telemedicine (video visit) versus in person care; bill my insurance or make self-payment for services provided; and responsibility for payment of non-covered services.     Mode of Communication:  Video Conference via zoom    As the provider I attest to compliance with applicable laws and regulations related to telemedicine.      NUMBER OF PARTICIPANTS: 7    Summary of Group / Topics Discussed:  Behavioral Activation: The Change Process - Behavior Change: Patients explored the process and types of change, including but not limited to, theories of change, steps to making change, methods of changing behavior, and potential barriers.  Patients worked to identify what changes may benefit their daily lives, and work towards a plan to implement change.      Patient Session Goals / Objectives:    Demonstrate understanding of the change process.      Identify positive and negative behavioral patterns.    Make plans to track and implement changes and share experiences in group.    Identify personal barriers to change      Patient Participation / Response:  Minimally participated, only when prompted / asked.    Demonstrated understanding of topics discussed through group  discussion and participation    Treatment Plan:  Patient has an initial individualized treatment plan that was created as part of their diagnostic assessment / admission process.  A master individualized treatment plan is in the process of being developed with the patient and multi-disciplinary care team.    ROLAN Bond

## 2021-04-26 NOTE — GROUP NOTE
Psychotherapy Group Note  Telemedicine Visit: The patient's condition can be safely assessed and treated via synchronous audio and visual telemedicine encounter.    Reason for Telemedicine Visit: Patient has requested telehealth visit  Originating Site (Patient Location): Patient's home  Distant Site (Provider Location): [facility] Outpatient Setting: Adult Day Tx  Consent:  The patient/guardian has verbally consented to: the potential risks and benefits of telemedicine (video visit) versus in person care; bill my insurance or make self-payment for services provided; and responsibility for payment of non-covered services.   Mode of Communication:  Video Conference via [employer]  As the provider I attest to compliance with applicable laws and regulations related to telemedicine.    PATIENT'S NAME: Jailene Breen  MRN:   [id number]  :   1965  ACCT. NUMBER: [id number]  DATE OF SERVICE: 21  START TIME: 10:00 AM  END TIME: 10:50 AM  FACILITATOR: Josefina Beauchamp PsyD  TOPIC:  EBP Group: Emotions Management  [facility] Adult Mental Health Day Treatment  TRACK: 2A    NUMBER OF PARTICIPANTS: 7    Summary of Group / Topics Discussed:  Emotions Management: Guilt and Shame: Patients explored and shared personal experiences associated with feelings of guilt and shame.  Group explored how these feelings develop, what they mean to each individual, and how to increase acceptance and usefulness of these feelings.  Group members assisted one another to contextualize these concepts and promote healing.     Patient Session Goals / Objectives:    Discuss and review definitions and personal views/experiences with guilt and shame    Understand the differences between guilt and shame    Explore how feelings of guilt and shame impact functioning    Understand and practice strategies to manage difficult emotions and move towards healing    Understand and normalize difficult emotions through group  discussion    Understand the utility of guilt and shame    Target  unwanted  emotions for change      Patient Participation / Response:  Fully participated with the group by sharing personal reflections / insights and openly received / provided feedback with other participants.    Self-aware of experiences with difficult emotions, and strategies to employ to manage them    Treatment Plan:  Patient has a current master individualized treatment plan.  See Epic treatment plan for more information.    Alisia Woodward Psy., D,  Licensed Clinical Psychologist

## 2021-04-26 NOTE — GROUP NOTE
Process Group Note  Telemedicine Visit: The patient's condition can be safely assessed and treated via synchronous audio and visual telemedicine encounter.    Reason for Telemedicine Visit: Patient has requested telehealth visit  Originating Site (Patient Location): Patient's home  Distant Site (Provider Location): [facility] Outpatient Setting: Adult Day Tx  Consent:  The patient/guardian has verbally consented to: the potential risks and benefits of telemedicine (video visit) versus in person care; bill my insurance or make self-payment for services provided; and responsibility for payment of non-covered services.   Mode of Communication:  Video Conference via [employer]  As the provider I attest to compliance with applicable laws and regulations related to telemedicine.    PATIENT'S NAME: Jailene Breen  MRN:   [id number]  :   1965  ACCT. NUMBER: [id number]  DATE OF SERVICE: 21  START TIME:  9:00 AM  END TIME:  9:50 AM  FACILITATOR: Josefina Beauchamp PsyD  TOPIC: MH Process Group    Diagnoses:    Major Depressive Disorder, recurrent, moderate  JEFF  Panic Disorder    Rheumatoid Arthritis    Fredo Hughes MD MD Orthopedics [address]  21   Phone: [phone]; Fax: [phone]      Aiden Resendez PA  Assigned PCP  2020   Namita Sharma MD Resident Student in organized health care education/training program 2020 [address]  20   Phone: [phone]; Fax: [phone]      Barbie Christie, PhD LP Psychologist Licensed Mental Health [address]  20   Phone: [phone]; Fax: [phone]      Comment: supervising      Susana Pike APRN CNP Referring Physician Nurse Practitioner [address]  20   Phone: [phone]; Fax: [phone]      Raghu Parada MD  Assigned Rheumatology Provider  10/23/2020   10/28/20            Chanelle Solo MD MD Psychiatry [address]  21   Phone: [phone]; Fax:  [phone]      Comment: Attending      Coffin, Richard Breyen, MD Resident Psychiatry 02/24/2021 End  2/24/21   Phone: [phone]; Fax: [phone]            [facility] Adult Mental Health Day Treatment  TRACK: 2A    NUMBER OF PARTICIPANTS: 7          Data:    Session content: At the start of this group, patients were invited to check in by identifying themselves, describing their current emotional status, and identifying issues to address in this group.   Area(s) of treatment focus addressed in this session included Symptom Management, Personal Safety, Community Resources/Discharge Planning and Abstinence/Relapse Prevention.  Client reported being safe today.  Reported mood is anxious.   Goal for today is to attend therapy groups. The client talked to the group about he is waking up at night from pain and can't get back to sleep. He reported that he will try to contact his pain clinic doctor about this. He stated that he has medical marijuana to help him and trazodone, but doesn't feel it is working well.       Therapeutic Interventions/Treatment Strategies:  Psychotherapist reinforced use of skills. Treatment modalities used include Cognitive Behavioral Therapy and Dialectical Behavioral Therapy. Interventions include Cognitive Restructuring:  Facilitated recognition of the connection between negative thoughts and negative core beliefs, Coping Skills: Reviewed patients current calming practices and discussed a more formal way of practicing and accessing skills, Mindfulness: Facilitated discussion of when/how to use mindfulness skills to benefit general health, mental health symptoms, and stressors and Symptoms Management: Promoted understanding of their diagnoses and how it impacts their functioning.    Assessment:    Patient response:   Patient responded to session by listening, being attentive and accepting support    Possible barriers to participation / learning include: severity of  symptoms    Health Issues:   Yes: Pain, Associated Psychological Distress       Substance Use Review:   Substance Use: No active concerns identified.    Mental Status/Behavioral Observations  Appearance:   Appropriate   Eye Contact:   Good   Psychomotor Behavior: Normal   Attitude:   Cooperative   Orientation:   All  Speech   Rate / Production: Normal    Volume:  Normal   Mood:    Anxious  Depressed   Affect:    Constricted   Thought Content:   Clear  Thought Form:  Coherent  Logical     Insight:    Good     Plan:     Safety Plan: Recommended that patient call 911 or go to the local ED should there be a change in any of these risk factors.     Barriers to treatment: None identified    Patient Contracts (see media tab):  None    Substance Use: Provided encouragement towards sobriety     Continue or Discharge: Patient will continue in Adult Day Treatment (ADT)  as planned. Patient is likely to benefit from learning and using skills as they work toward the goals identified in their treatment plan.      Josefina Beauchamp PsyD  April 26, 2021  Elpidio Blake, TIRSO,  Licensed Clinical Psychologist

## 2021-04-27 ENCOUNTER — HOSPITAL ENCOUNTER (OUTPATIENT)
Dept: BEHAVIORAL HEALTH | Facility: CLINIC | Age: 56
End: 2021-04-27
Attending: PSYCHIATRY & NEUROLOGY
Payer: COMMERCIAL

## 2021-04-27 PROCEDURE — 90853 GROUP PSYCHOTHERAPY: CPT | Mod: 95 | Performed by: PSYCHOLOGIST

## 2021-04-27 NOTE — GROUP NOTE
Process Group Note  Telemedicine Visit: The patient's condition can be safely assessed and treated via synchronous audio and visual telemedicine encounter.    Reason for Telemedicine Visit: Patient has requested telehealth visit  Originating Site (Patient Location): Patient's home  Distant Site (Provider Location): [facility] Outpatient Setting: Adult Day Tx  Consent:  The patient/guardian has verbally consented to: the potential risks and benefits of telemedicine (video visit) versus in person care; bill my insurance or make self-payment for services provided; and responsibility for payment of non-covered services.   Mode of Communication:  Video Conference via [email or web address]  As the provider I attest to compliance with applicable laws and regulations related to telemedicine.    PATIENT'S NAME: Jailene Breen  MRN:   [id number]  :   1965  ACCT. NUMBER: [id number]  DATE OF SERVICE: 21  START TIME:  9:00 AM  END TIME:  9:50 AM  FACILITATOR: Josefina Beauchamp PsyD  TOPIC: MH Process Group    Diagnoses:    Major Depressive Disorder, recurrent, moderate  JEFF  Panic Disorder    Rheumatoid Arthritis    Fredo Hughes MD MD Orthopedics 2016 End  21   Phone: [phone]; Fax: [phone]      Aiden Resendez PA  Assigned PCP  2020   Namita Sharma MD Resident Student in organized health care education/training program 2020 End  20   Phone: [phone]; Fax: [phone]      Barbie Christie, PhD LP Psychologist Licensed Mental Health 2020 End  20   Phone: [phone]; Fax: [phone]      Comment: supervising      Susana Pike APRN CNP Referring Physician Nurse Practitioner 2020 End  20   Phone: [phone]; Fax: [phone]      Raghu Parada MD  Assigned Rheumatology Provider  10/23/2020   10/28/20            Chanelle Solo MD MD Psychiatry 2021 End  21   Phone: [phone]; Fax:  [phone]      Comment: Attending      Coffin, Richard Breyen, MD Resident Psychiatry 02/24/2021 End  2/24/21   Phone: [phone]; Fax: [phone]            [facility] Mental Health Day Treatment  TRACK: 2A    NUMBER OF PARTICIPANTS: 4          Data:    Session content: At the start of this group, patients were invited to check in by identifying themselves, describing their current emotional status, and identifying issues to address in this group.   Area(s) of treatment focus addressed in this session included Symptom Management, Personal Safety and Community Resources/Discharge Planning.  Client reported being safe today.  Reported mood is depressed.   Goal for today is to attend therapy groups.  The client talked to the group about how he slept for about 5 -6 hours last night and still felt drowsy this morning. He reported that the Oxycotin stopped the pain at night, so he slept better, but it was discontinued after his stay in the hospital..  He stated that he has an increase in depression, anxiety, and suicidal ideation, but no plan or intent to harm himself today.    Therapeutic Interventions/Treatment Strategies:  Psychotherapist reinforced use of skills. Treatment modalities used include Dialectical Behavioral Therapy. Interventions include Cognitive Restructuring:  Facilitated recognition of the connection between negative thoughts and negative core beliefs, Coping Skills: Promoted understanding of how and when to apply grounding strategies to reduce distress and increase presence in the moment, Relapse Prevention: Coached on skills to manage factors that contribute to relapse and Mindfulness: Facilitated discussion of when/how to use mindfulness skills to benefit general health, mental health symptoms, and stressors.    Assessment:    Patient response:   Patient responded to session by giving feedback, listening and being attentive    Possible barriers to participation / learning  include: severity of symptoms    Health Issues:   Yes: Pain, Associated Psychological Distress       Substance Use Review:   Substance Use: No active concerns identified.    Mental Status/Behavioral Observations  Appearance:   Appropriate   Eye Contact:   Good   Psychomotor Behavior: Normal   Attitude:   Cooperative   Orientation:   All  Speech   Rate / Production: Normal    Volume:  Normal   Mood:    Anxious  Depressed  Sad   Affect:    Constricted   Thought Content:   Rumination  Thought Form:  Coherent  Logical     Insight:    Good     Plan:     Safety Plan: Recommended that patient call 911 or go to the local ED should there be a change in any of these risk factors.     Barriers to treatment: None identified    Patient Contracts (see media tab):  None    Substance Use: Provided encouragement towards sobriety     Continue or Discharge: Patient will continue in Adult Day Treatment (ADT)  as planned. Patient is likely to benefit from learning and using skills as they work toward the goals identified in their treatment plan.      Josefina Beauchamp PsyD  April 27, 2021  Elpidio Blake, TIRSO,  Licensed Clinical Psychologist

## 2021-04-28 NOTE — ADDENDUM NOTE
Encounter addended by: Kiara Daugherty [facility] on: 4/28/2021 12:13 PM   Actions taken: Flowsheet accepted

## 2021-04-29 ENCOUNTER — HOSPITAL ENCOUNTER (OUTPATIENT)
Dept: BEHAVIORAL HEALTH | Facility: CLINIC | Age: 56
End: 2021-04-29
Attending: PSYCHIATRY & NEUROLOGY
Payer: COMMERCIAL

## 2021-04-29 ENCOUNTER — MYC MEDICAL ADVICE (OUTPATIENT)
Dept: PALLIATIVE MEDICINE | Facility: CLINIC | Age: 56
End: 2021-04-29

## 2021-04-29 DIAGNOSIS — G89.29 CHRONIC BILATERAL LOW BACK PAIN WITH RIGHT-SIDED SCIATICA: ICD-10-CM

## 2021-04-29 DIAGNOSIS — M51.369 DDD (DEGENERATIVE DISC DISEASE), LUMBAR: ICD-10-CM

## 2021-04-29 DIAGNOSIS — M45.7 ANKYLOSING SPONDYLITIS OF LUMBOSACRAL REGION (H): ICD-10-CM

## 2021-04-29 DIAGNOSIS — M54.59 LUMBAR FACET JOINT PAIN: ICD-10-CM

## 2021-04-29 DIAGNOSIS — M54.41 CHRONIC BILATERAL LOW BACK PAIN WITH RIGHT-SIDED SCIATICA: ICD-10-CM

## 2021-04-29 DIAGNOSIS — F11.20 UNCOMPLICATED OPIOID DEPENDENCE (H): ICD-10-CM

## 2021-04-29 DIAGNOSIS — M05.79 RHEUMATOID ARTHRITIS INVOLVING MULTIPLE SITES WITH POSITIVE RHEUMATOID FACTOR (H): ICD-10-CM

## 2021-04-29 DIAGNOSIS — M25.551 HIP PAIN, RIGHT: ICD-10-CM

## 2021-04-29 PROCEDURE — 90853 GROUP PSYCHOTHERAPY: CPT | Mod: GT

## 2021-04-29 PROCEDURE — 90853 GROUP PSYCHOTHERAPY: CPT | Mod: GT | Performed by: PSYCHOLOGIST

## 2021-04-29 NOTE — GROUP NOTE
Psychotherapy Group Note    PATIENT'S NAME: Jailene Breen  MRN:   [id number]  :   1965  ACCT. NUMBER: [id number]  DATE OF SERVICE: 21  START TIME: 11:00 AM  END TIME: 11:50 AM  FACILITATOR: Kiara Daugherty LICSW  TOPIC: MH EBP Group: Self-Awareness  [facility] Adult Mental Health Day Treatment  TRACK: 2A    Telemedicine Visit: The patient's condition can be safely assessed and treated via synchronous audio and visual telemedicine encounter.      Reason for Telemedicine Visit: Services only offered telehealth    Originating Site (Patient Location): Patient's home    Distant Site (Provider Location): [facility]: [facility]    Consent:  The patient/guardian has verbally consented to: the potential risks and benefits of telemedicine (video visit) versus in person care; bill my insurance or make self-payment for services provided; and responsibility for payment of non-covered services.     Mode of Communication:  Video Conference via zoom    As the provider I attest to compliance with applicable laws and regulations related to telemedicine.      NUMBER OF PARTICIPANTS: 5    Summary of Group / Topics Discussed:  Self-Awareness: Self-Compassion: Patients received overview of key concepts in developing self-compassion. Patients discussed mindfulness, self-kindness, and finding common humanity. Patients identified their current approach to problems in their lives and learned skills for increasing self-compassion. Patients identified ways they can put self-compassion skills into practice and problem solve barriers to application of skills.     Patient Session Goals / Objectives:    [place] components of self-compassion    Identify ways to practice self-compassion in daily life    Problem solve barriers to self-compassion practice      Patient Participation / Response:  Minimally participated, only when prompted / asked.    Demonstrated understanding of topics discussed through group  discussion and participation    Treatment Plan:  Patient has a current master individualized treatment plan.  See Epic treatment plan for more information.    Kiara Mccray, WALLYSW

## 2021-04-29 NOTE — GROUP NOTE
Process Group Note  Telemedicine Visit: The patient's condition can be safely assessed and treated via synchronous audio and visual telemedicine encounter.    Reason for Telemedicine Visit: Patient has requested telehealth visit  Originating Site (Patient Location): Patient's home  Distant Site (Provider Location): [facility] Outpatient Setting: Adult Day Tx  Consent:  The patient/guardian has verbally consented to: the potential risks and benefits of telemedicine (video visit) versus in person care; bill my insurance or make self-payment for services provided; and responsibility for payment of non-covered services.   Mode of Communication:  Video Conference via [employer]  As the provider I attest to compliance with applicable laws and regulations related to telemedicine.    PATIENT'S NAME: Jailene Breen  MRN:   [id number]  :   1965  ACCT. NUMBER: [id number]  DATE OF SERVICE: 21  START TIME:  9:00 AM  END TIME:  9:50 AM  FACILITATOR: Josefina Beauchamp PsyD  TOPIC: MH Process Group    Diagnoses:    Major Depressive Disorder, recurrent, moderate  JEFF  Panic Disorder    Rheumatoid Arthritis    Fredo Hughes MD MD Orthopedics 2016 End  21   Phone: [phone]; Fax: [phone]      Aiden Resendez PA  Assigned PCP  2020   Namita Sharma MD Resident Student in organized health care education/training program 2020 End  20   Phone: [phone]; Fax: [phone]      Barbie Christie, PhD LP Psychologist Licensed Mental Health 2020 End  20   Phone: [phone]; Fax: [phone]      Comment: supervising      Susana Pike APRN CNP Referring Physician Nurse Practitioner 2020 End  20   Phone: [phone]; Fax: [phone]      Raghu Parada MD  Assigned Rheumatology Provider  10/23/2020   10/28/20            Chanelle Solo MD MD Psychiatry 2021 End  21   Phone: [phone]; Fax:  [phone]      Comment: Attending      Coffin, Richard Breyen, MD Resident Psychiatry 02/24/2021 End  2/24/21   Phone: [phone]; Fax: [phone]            [facility] Adult Mental Health Day Treatment  TRACK: 2A    NUMBER OF PARTICIPANTS: 4          Data:    Session content: At the start of this group, patients were invited to check in by identifying themselves, describing their current emotional status, and identifying issues to address in this group.   Area(s) of treatment focus addressed in this session included Symptom Management, Personal Safety and Community Resources/Discharge Planning.  Client reported being safe today.  Reported mood is ok but anxious.  Goal for today is to attend therapy groups. The client talked to the group about how he uses distraction to manage his anxiety and will read, do dishes, take a cold shower, or go for a drive, when he is feeling anxious. He reported a migraine on Tuesday and Wednesday and it went away today, and said he has had prescription medicine for it, but it only works if he takes it immediately.       Therapeutic Interventions/Treatment Strategies:  Psychotherapist reinforced use of skills. Treatment modalities used include Cognitive Behavioral Therapy and Dialectical Behavioral Therapy. Interventions include Relapse Prevention: Coached on skills to manage factors that contribute to relapse, Mindfulness: Facilitated discussion of when/how to use mindfulness skills to benefit general health, mental health symptoms, and stressors and Symptoms Management: Promoted understanding of their diagnoses and how it impacts their functioning.    Assessment:    Patient response:   Patient responded to session by accepting feedback, giving feedback and listening    Possible barriers to participation / learning include: severity of symptoms    Health Issues:   None reported       Substance Use Review:   Substance Use: No active concerns identified.    Mental  Status/Behavioral Observations  Appearance:   Appropriate   Eye Contact:   Good   Psychomotor Behavior: Normal   Attitude:   Cooperative   Orientation:   All  Speech   Rate / Production: Normal    Volume:  Normal   Mood:    Anxious  Depressed   Affect:    Constricted   Thought Content:   Rumination  Thought Form:  Coherent  Logical     Insight:    Good     Plan:     Safety Plan: Recommended that patient call 911 or go to the local ED should there be a change in any of these risk factors.     Barriers to treatment: None identified    Patient Contracts (see media tab):  None    Substance Use: Provided encouragement towards sobriety     Continue or Discharge: Patient will continue in Adult Day Treatment (ADT)  as planned. Patient is likely to benefit from learning and using skills as they work toward the goals identified in their treatment plan.      Josefina Beauchamp PsyD  April 29, 2021  Elpidio Blake, D,  Licensed Clinical Psychologist

## 2021-04-29 NOTE — GROUP NOTE
Telemedicine Visit: The patient's condition can be safely assessed and treated via synchronous audio and visual telemedicine encounter.      Reason for Telemedicine Visit: Services only offered telehealth    Originating Site (Patient Location): Patient's home    Distant Site (Provider Location): Provider Remote Setting    Consent:  The patient/guardian has verbally consented to: the potential risks and benefits of telemedicine (video visit) versus in person care; bill my insurance or make self-payment for services provided; and responsibility for payment of non-covered services.     Mode of Communication:  Video Conference via [employer]    As the provider I attest to compliance with applicable laws and regulations related to telemedicine.  Psychotherapy Group Note    PATIENT'S NAME: Jailene Breen  MRN:   [id number]  :   1965  ACCT. NUMBER: [id number]  DATE OF SERVICE: 21  START TIME: 10:00 AM  END TIME: 10:50 AM  FACILITATOR: Maryann Duggan LMFT  TOPIC:  EBP Group: Cognitive Restructuring  [facility] Adult Mental Health Day Treatment  TRACK: 2A    NUMBER OF PARTICIPANTS: 5    Summary of Group / Topics Discussed:  Cognitive Restructuring: Distortions: Patients received an overview of how to identify common cognitive distortions. Patients will explore alternatives to cognitive distortions and practice challenging their negative thought patterns. The goal is to help patients target modify ineffective thought patterns.     Patient Session Goals / Objectives:    Familiarized self with ineffective / unhealthy thoughts and how they develop.      Explored impact of ineffective thoughts / distortions on mood and activity    Formulated new neutral/positive alternatives to challenge less helpful / ineffective thoughts.    Practiced and plan to apply in daily life               Patient Participation / Response:  Fully participated with the group by sharing personal reflections / insights and openly  received / provided feedback with other participants.    Demonstrated understanding of topics discussed through group discussion and participation, Demonstrated knowledge of personal thought patterns and how they impact their mood and behavior. and Practiced skills in session    Treatment Plan:  Patient has a current master individualized treatment plan.  See Epic treatment plan for more information.    ROSEMARY Hanna

## 2021-04-30 NOTE — TELEPHONE ENCOUNTER
Received call from patient requesting refill(s) of Suboxone     Last dispensed from pharmacy on 4/19/21    Patient's last office/virtual visit by prescribing provider on 4/21/21  Next office/virtual appointment scheduled for none    Last urine drug screen date 1/21/20  Current opioid agreement on file (completed within the last year) No Date of opioid agreement: 1/21/20    E-prescribe to  [facility] pharmacy in [place] pharmacy    Will route to nursing pool for review and preparation of prescription(s).

## 2021-04-30 NOTE — TELEPHONE ENCOUNTER
Routed to the nursing pool and to the MA pool to process refill(s).    Cha Molina, RN-BSN  [facility]

## 2021-04-30 NOTE — TELEPHONE ENCOUNTER
Medication refill information reviewed.     Due date for Suboxone is 5/3/21     Prescriptions prepped for review.     Will route to provider.     Please advise when you would like Pt to have UDS and CSA done.    Josias Lopez, SANDRA  Care Coordinator   [facility] [place]

## 2021-05-02 RX ORDER — BUPRENORPHINE AND NALOXONE 8; 2 MG/1; MG/1
FILM, SOLUBLE BUCCAL; SUBLINGUAL
Qty: 30 EACH | Refills: 0 | Status: SHIPPED | OUTPATIENT
Start: 2021-05-02 | End: 2021-05-17

## 2021-05-03 ENCOUNTER — HOSPITAL ENCOUNTER (OUTPATIENT)
Dept: BEHAVIORAL HEALTH | Facility: CLINIC | Age: 56
End: 2021-05-03
Attending: PSYCHIATRY & NEUROLOGY
Payer: COMMERCIAL

## 2021-05-03 PROCEDURE — 90853 GROUP PSYCHOTHERAPY: CPT | Mod: GT | Performed by: SOCIAL WORKER

## 2021-05-03 PROCEDURE — 90853 GROUP PSYCHOTHERAPY: CPT | Mod: 95 | Performed by: PSYCHOLOGIST

## 2021-05-03 NOTE — TELEPHONE ENCOUNTER
Signed Prescriptions:                        Disp   Refills    buprenorphine HCl-naloxone HCl (SUBOXONE) *30 each0        Sig: Use 0.5 film under the tongue 4 times per day for           pain, approximately every 6 hours. Max 2 films           per day. Fill and begin 5/3/2021.  15 day supply  Authorizing Provider: SUSANA PETTY    Reviewed [place]  May 2, 2021- no concerning fills.    Susana Petty APRN, RN CNP, FNP  [facility]  [facility]

## 2021-05-03 NOTE — GROUP NOTE
Process Group Note  Telemedicine Visit: The patient's condition can be safely assessed and treated via synchronous audio and visual telemedicine encounter.    Reason for Telemedicine Visit: Patient has requested telehealth visit  Originating Site (Patient Location): Patient's home  Distant Site (Provider Location): [facility] Outpatient Setting: Adult Day Tx  Consent:  The patient/guardian has verbally consented to: the potential risks and benefits of telemedicine (video visit) versus in person care; bill my insurance or make self-payment for services provided; and responsibility for payment of non-covered services.   Mode of Communication:  Video Conference via [employer]  As the provider I attest to compliance with applicable laws and regulations related to telemedicine.    PATIENT'S NAME: Jailene Breen  MRN:   [id number]  :   1965  ACCT. NUMBER: [id number]  DATE OF SERVICE: 21  START TIME: 10:00 AM  END TIME: 10:50 AM  FACILITATOR: Josefina Beauchamp PsyD  TOPIC: MH Process Group    Diagnoses:    Major Depressive Disorder, recurrent, moderate  JEFF  Panic Disorder    Rheumatoid Arthritis    Fredo Hughes MD MD Orthopedics 2016 End  21   Phone: [phone]; Fax: [phone]      Aiden Resendez PA  Assigned PCP  2020   Namita Sharma MD Resident Student in organized health care education/training program 2020 End  20   Phone: [phone]; Fax: [phone]      Barbie Christie, PhD LP Psychologist Licensed Mental Health 2020 End  20   Phone: [phone]; Fax: [phone]      Comment: supervising      Susana Pike APRN CNP Referring Physician Nurse Practitioner 2020 End  20   Phone: [phone]; Fax: [phone]      Raghu Parada MD  Assigned Rheumatology Provider  10/23/2020   10/28/20            Chanelle Solo MD MD Psychiatry 2021 End  21   Phone: [phone]; Fax:  [phone]      Comment: Attending      Coffin, Richard Breyen, MD Resident Psychiatry 02/24/2021 End  2/24/21   Phone: [phone]; Fax: [phone]            [facility] Adult Mental Health Day Treatment  TRACK: 2A    NUMBER OF PARTICIPANTS: 7          Data:    Session content: At the start of this group, patients were invited to check in by identifying themselves, describing their current emotional status, and identifying issues to address in this group.   Area(s) of treatment focus addressed in this session included Symptom Management, Personal Safety and Community Resources/Discharge Planning.  Client reported being safe today.  Reported mood is depressed.     Goal for today is to attend therapy groups.   The client talked to the group about how he felt that the medical marijuana was not helping his pain level and had an appointment to talk with the pharmacist about dosage levels. He reported that he felt some passive suicidal thoughts, in regard to his pain level and not being able to work. And said that he wished he wouldn't wake up anymore. He reported feeling worthlessness nad guilty about not working and not being able to get the social security disability approval.  He reported that he had no energy and no motivation, sadness, and agreed to try to talk to his providers. He reported not intent or plan and said he was too lazy to do anything.       Therapeutic Interventions/Treatment Strategies:  Psychotherapist reinforced use of skills. Treatment modalities used include Cognitive Behavioral Therapy and Dialectical Behavioral Therapy. Interventions include Cognitive Restructuring:  Facilitated recognition of the connection between negative thoughts and negative core beliefs, Coping Skills: Reviewed patients current calming practices and discussed a more formal way of practicing and accessing skills and Relapse Prevention: Facilitated understanding of effective coping skills in response to  triggers for substance use.    Assessment:    Patient response:   Patient responded to session by accepting feedback, giving feedback and being attentive    Possible barriers to participation / learning include: severity of symptoms    Health Issues:   Yes: Pain, Associated Psychological Distress       Substance Use Review:   Substance Use: No active concerns identified.    Mental Status/Behavioral Observations  Appearance:   Appropriate   Eye Contact:   Good   Psychomotor Behavior: Normal   Attitude:   Cooperative   Orientation:   All  Speech   Rate / Production: Normal    Volume:  Normal   Mood:    Depressed  Sad   Affect:    Constricted   Thought Content:   Rumination  Thought Form:  Coherent  Logical     Insight:    Good     Plan:     Safety Plan: Recommended that patient call 911 or go to the local ED should there be a change in any of these risk factors.     Barriers to treatment: None identified    Patient Contracts (see media tab):  None    Substance Use: Provided encouragement towards sobriety     Continue or Discharge: Patient will continue in Adult Day Treatment (ADT)  as planned. Patient is likely to benefit from learning and using skills as they work toward the goals identified in their treatment plan.      Josefina Beauchamp PsyD  May 3, 2021  Elpidio Blake, D,  Licensed Clinical Psychologist

## 2021-05-03 NOTE — GROUP NOTE
Psychotherapy Group Note    PATIENT'S NAME: Jailene Breen  MRN:   [id number]  :   1965  ACCT. NUMBER: [id number]  DATE OF SERVICE: 21  START TIME:  9:00 AM  END TIME:  9:50 AM  FACILITATOR: Liliya Oh LICSW  TOPIC: MH EBP Group: Cognitive Restructuring  [facility] Adult Mental Health Day Treatment  TRACK: 2A    NUMBER OF PARTICIPANTS: 7    Summary of Group / Topics Discussed:  Cognitive Restructuring: Distortions: Patients received an overview of how to identify common cognitive distortions. Patients will explore alternatives to cognitive distortions and practice challenging their negative thought patterns. The goal is to help patients target modify ineffective thought patterns.  As client's did part one of this topic last week, the PLEASE skills were added.      Patient Session Goals / Objectives:    Familiarized self with ineffective / unhealthy thoughts and how they develop.      Explored impact of ineffective thoughts / distortions on mood and activity    Formulated new neutral/positive alternatives to challenge less helpful / ineffective thoughts.    Practiced and plan to apply in daily life    Reviewed PLEASE skills application.    Telemedicine Visit: The patient's condition can be safely assessed and treated via synchronous audio and visual telemedicine encounter.      Reason for Telemedicine Visit: Services only offered telehealth    Originating Site (Patient Location): Patient's home    Distant Site (Provider Location): Provider Remote Setting    Consent:  The patient/guardian has verbally consented to: the potential risks and benefits of telemedicine (video visit) versus in person care; bill my insurance or make self-payment for services provided; and responsibility for payment of non-covered services.     Mode of Communication:  Video Conference via [employer]    As the provider I attest to compliance with applicable laws and regulations related to telemedicine.               Patient Participation / Response:  Fully participated with the group by sharing personal reflections / insights and openly received / provided feedback with other participants.    Demonstrated understanding of topics discussed through group discussion and participation    Treatment Plan:  Patient has a current master individualized treatment plan.  See Epic treatment plan for more information.    Liliya Oh, [facility]

## 2021-05-03 NOTE — GROUP NOTE
Psychoeducation Group Note    PATIENT'S NAME: Jailene Breen  MRN:   [id number]  :   1965  ACCT. NUMBER: [id number]  DATE OF SERVICE: 21  START TIME: 11:00 AM  END TIME: 11:50 AM  FACILITATOR: Liliya Oh LICSW  TOPIC: MH RN Group: Health Maintenance  [facility] Adult Mental Health Day Treatment  TRACK: 2A    NUMBER OF PARTICIPANTS: 7    Summary of Group / Topics Discussed:  Health Maintenance: Goal Setting: Meaningful goals can bring a sense of direction and purpose in life.  They also highlight our most important values. Patients were assisted by instructor to identify short term goals to promote their mental health recovery and improve overall health and wellness. Patients were educated on SMART goal setting framework as a strategy to increase outcomes and promote success.    Patient Session Goals / Objectives:  ? Explained the key concepts of SMART goal setting framework  ? Identified three goals successfully using SMART goal setting framework  ? Reviewed concept of balance in wellness as it pertains to goal setting     Telemedicine Visit: The patient's condition can be safely assessed and treated via synchronous audio and visual telemedicine encounter.      Reason for Telemedicine Visit: Services only offered telehealth    Originating Site (Patient Location): Patient's home    Distant Site (Provider Location): Provider Remote Setting    Consent:  The patient/guardian has verbally consented to: the potential risks and benefits of telemedicine (video visit) versus in person care; bill my insurance or make self-payment for services provided; and responsibility for payment of non-covered services.     Mode of Communication:  Video Conference via [employer]    As the provider I attest to compliance with applicable laws and regulations related to telemedicine.       Patient Participation / Response:  Fully participated with the group by sharing personal reflections / insights and  openly received / provided feedback with other participants.    Identified / Expressed personal readiness to practice skills    Treatment Plan:  Patient has a current master individualized treatment plan.  See Epic treatment plan for more information.    Liliya Oh, WALLYSW

## 2021-05-03 NOTE — TELEPHONE ENCOUNTER
[employer] message sent with Rx approval from provider.  Fredi  Pain Clinic Management Team

## 2021-05-04 ENCOUNTER — TELEPHONE (OUTPATIENT)
Dept: BEHAVIORAL HEALTH | Facility: CLINIC | Age: 56
End: 2021-05-04

## 2021-05-04 NOTE — ADDENDUM NOTE
Encounter addended by: Josefina Beauchamp PsyD on: 5/4/2021 4:43 PM   Actions taken: Clinical Note Signed

## 2021-05-04 NOTE — TELEPHONE ENCOUNTER
Phone call to Jamison, who is having computer problems.    Juan Blake., D,  Licensed Clinical Psychologist

## 2021-05-04 NOTE — PROGRESS NOTES
Acknowledgement of Current Treatment Plan       I have reviewed my treatment plan with my therapist / counselor on 4/26/21.   I agree with the plan as it is written in the electronic health record. (2A)    Name:      Signature:  Jailene Breen Unable to sign due to COVID19       Dr Saul Quesada MD  Psychiatrist    Juan Blake.TIRSO,  Licensed Psychologist   Elpidio Blake, D,  Licensed Clinical Psychologist

## 2021-05-06 ENCOUNTER — HOSPITAL ENCOUNTER (OUTPATIENT)
Dept: BEHAVIORAL HEALTH | Facility: CLINIC | Age: 56
End: 2021-05-06
Attending: PSYCHIATRY & NEUROLOGY
Payer: COMMERCIAL

## 2021-05-06 PROCEDURE — 90853 GROUP PSYCHOTHERAPY: CPT | Mod: 95

## 2021-05-06 PROCEDURE — 90853 GROUP PSYCHOTHERAPY: CPT | Mod: 95 | Performed by: PSYCHOLOGIST

## 2021-05-06 PROCEDURE — 90853 GROUP PSYCHOTHERAPY: CPT | Mod: GT | Performed by: PSYCHOLOGIST

## 2021-05-06 NOTE — GROUP NOTE
Psychotherapy Group Note  Telemedicine Visit: The patient's condition can be safely assessed and treated via synchronous audio and visual telemedicine encounter.    Reason for Telemedicine Visit: Patient has requested telehealth visit  Originating Site (Patient Location): Patient's home  Distant Site (Provider Location): [facility] Outpatient Setting: Adult Day Tx  Consent:  The patient/guardian has verbally consented to: the potential risks and benefits of telemedicine (video visit) versus in person care; bill my insurance or make self-payment for services provided; and responsibility for payment of non-covered services.   Mode of Communication:  Video Conference via [employer]  As the provider I attest to compliance with applicable laws and regulations related to telemedicine.    PATIENT'S NAME: Jailene Breen  MRN:   [id number]  :   1965  ACCT. NUMBER: [id number]  DATE OF SERVICE: 21  START TIME: 10:00 AM  END TIME: 10:50 AM  FACILITATOR: Josefina Beauchamp PsyD  TOPIC: MH EBP Group: Specialty Awareness  [facility] Adult Mental Health Day Treatment  TRACK: 2A    NUMBER OF PARTICIPANTS: 6    Summary of Group / Topics Discussed:  Specialty Topics: Trauma and PTSD: Patients were provided with information to understand the types and origins of trauma, the relationship between trauma and PTSD, the scope of Trauma-Informed Care, and treatment options. Patients were able to explore how trauma may have impacted their functioning directly or indirectly, with reference to the the complexity of trauma and associated treatment options. Patients reviewed their current awareness of this topic and relevance to their functioning. Individual experiences with symptoms and treatment options were discussed.     Patient Session Goals / Objectives:    Discussed definitions of trauma, Trauma-Informed Care, PTSD    Discussed how traumatic experiences affect the individual and their relationships (directly,  indirectly, brain development and function)    Identified how a history of trauma or exposure to trauma may impact group work    Assisted patients to find ways to adapt functioning in light of past traumatic experience(s), and to identify suitable treatment options and community resources      Patient Participation / Response:  Fully participated with the group by sharing personal reflections / insights and openly received / provided feedback with other participants.    Identified / Expressed readiness to act on skill suggestions discussed in topic    Treatment Plan:  Patient has a current master individualized treatment plan.  See Epic treatment plan for more information.    Alisia Woodward Psy., TIRSO,  Licensed Clinical Psychologist

## 2021-05-06 NOTE — GROUP NOTE
Process Group Note  Telemedicine Visit: The patient's condition can be safely assessed and treated via synchronous audio and visual telemedicine encounter.    Reason for Telemedicine Visit: Patient has requested telehealth visit  Originating Site (Patient Location): Patient's home  Distant Site (Provider Location): [facility] Outpatient Setting: Adult Day Tx  Consent:  The patient/guardian has verbally consented to: the potential risks and benefits of telemedicine (video visit) versus in person care; bill my insurance or make self-payment for services provided; and responsibility for payment of non-covered services.   Mode of Communication:  Video Conference via [employer]  As the provider I attest to compliance with applicable laws and regulations related to telemedicine.    PATIENT'S NAME: Jailene Breen  MRN:   [id number]  :   1965  ACCT. NUMBER: [id number]  DATE OF SERVICE: 21  START TIME:  9:00 AM  END TIME:  9:50 AM  FACILITATOR: Josefina Beauchamp PsyD  TOPIC: MH Process Group    Diagnoses:    Major Depressive Disorder, recurrent, moderate  JEFF  Panic Disorder    Rheumatoid Arthritis    Fredo Hughes MD MD Orthopedics [address]   Phone: [phone]; Fax: [phone]      Aiden Resendez PA  Assigned PCP  2020   Namita Sharma MD Resident Student in organized health care education/training program 2020 [address]  20   Phone: [phone]; Fax: [phone]      Barbie Christie, PhD LP Psychologist Licensed Mental Health [address]  20   Phone: [phone]; Fax: [phone]      Comment: supervising      Susana Pike APRN CNP Referring Physician Nurse Practitioner [address]  20   Phone: [phone]; Fax: [phone]      Raghu Parada MD  Assigned Rheumatology Provider  10/23/2020   10/28/20            Chanelle Solo MD MD Psychiatry [address]  21   Phone: [phone]; Fax:  [phone]      Comment: Attending      Coffin, Richard Breyen, MD Resident Psychiatry 02/24/2021 End  2/24/21   Phone: [phone]; Fax: [phone]            [facility] Adult Mental Health Day Treatment  TRACK: 2A    NUMBER OF PARTICIPANTS: 6          Data:    Session content: At the start of this group, patients were invited to check in by identifying themselves, describing their current emotional status, and identifying issues to address in this group.   Area(s) of treatment focus addressed in this session included Symptom Management, Personal Safety and Community Resources/Discharge Planning.    Client reported being safe today.  Reported mood is depressed.     Goal for today is to attend therapy groups.  The client talked to the group about how he had many medical bills and couldn't pay them and that he didn't qualify for county assistance due to his wife's job. The group validated him and offered suggestions.  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    Therapeutic Interventions/Treatment Strategies:  Psychotherapist reinforced use of skills. Treatment modalities used include Cognitive Behavioral Therapy and Dialectical Behavioral Therapy. Interventions include Cognitive Restructuring:  Assisted patient in formulating new neutral/positive alternatives to challenge less helpful / ineffective thoughts, Coping Skills: Assisted patient in understanding the purpose of planning / creating / participating / sharing in positive experiences and Mindfulness: Facilitated discussion of when/how to use mindfulness skills to benefit general health, mental health symptoms, and stressors.    Assessment:    Patient response:   Patient responded to session by listening, focusing on goals and appearing disengaged    Possible barriers to participation / learning include: severity of symptoms    Health Issues:   Yes: Pain, Associated Psychological Distress       Substance Use Review:   Substance Use: No active concerns  identified.    Mental Status/Behavioral Observations  Appearance:   Appropriate   Eye Contact:   Good   Psychomotor Behavior: Normal   Attitude:   Cooperative   Orientation:   All  Speech   Rate / Production: Normal    Volume:  Normal   Mood:    Depressed  Sad   Affect:    Constricted   Thought Content:   Rumination  Thought Form:  Coherent  Logical     Insight:    Good     Plan:     Safety Plan: Recommended that patient call 911 or go to the local ED should there be a change in any of these risk factors.     Barriers to treatment: None identified    Patient Contracts (see media tab):  None    Substance Use: Provided encouragement towards sobriety     Continue or Discharge: Patient will continue in Adult Day Treatment (ADT)  as planned. Patient is likely to benefit from learning and using skills as they work toward the goals identified in their treatment plan.      Josefina Beauchamp PsyD  May 6, 2021  Elpidio Blake D,  Licensed Clinical Psychologist

## 2021-05-06 NOTE — GROUP NOTE
Telemedicine Visit: The patient's condition can be safely assessed and treated via synchronous audio and visual telemedicine encounter.      Reason for Telemedicine Visit: Services only offered telehealth    Originating Site (Patient Location): Patient's home    Distant Site (Provider Location): Provider Remote Setting    Consent:  The patient/guardian has verbally consented to: the potential risks and benefits of telemedicine (video visit) versus in person care; bill my insurance or make self-payment for services provided; and responsibility for payment of non-covered services.     Mode of Communication:  Video Conference via [facility]    As the provider I attest to compliance with applicable laws and regulations related to telemedicine.  Psychotherapy Group Note    PATIENT'S NAME: Jailene Breen  MRN:   [id number]  :   1965  ACCT. NUMBER: [id number]  DATE OF SERVICE: 21  START TIME: 11:00 AM  END TIME: 11:50 AM  FACILITATOR: Maryann Duggan LMFT  TOPIC:  EBP Group: Coping Skills  [facility] Mental Health Day Treatment  TRACK: 2A    NUMBER OF PARTICIPANTS:5    Summary of Group / Topics Discussed:  Coping Skills: Radical Acceptance: Patients learned radical acceptance as a way to tolerate heightened stress in the moment.  Patients identified situations that necessitate radical acceptance.  They focused on applying acceptance of the moment to tolerate difficult emotions and events. Patients discussed how to distinguish when this can be useful in their lives and when other skills are more relevant or helpful.    Patient Session Goals / Objectives:    Understand that some amount of pain exists for each of us in our lives    Process the difficulty of acceptance in our lives and benefits of radical acceptance to mood and functioning.    Demonstrate understanding of when to use the radical acceptance to tolerate distress by providing examples of situations where this could be  applied.    Identify barriers to applying radical acceptance to difficult situations and explore strategies to overcome them        Patient Participation / Response:  Fully participated with the group by sharing personal reflections / insights and openly received / provided feedback with other participants.    Demonstrated understanding of topics discussed through group discussion and participation, Expressed understanding of the relevance / importance of coping skills at distressing times in life and Identified / Expressed personal readiness to practice new coping skills    Treatment Plan:  Patient has a current master individualized treatment plan.  See Epic treatment plan for more information.    ROSEMARY Hanna

## 2021-05-10 ENCOUNTER — HOSPITAL ENCOUNTER (OUTPATIENT)
Dept: BEHAVIORAL HEALTH | Facility: CLINIC | Age: 56
End: 2021-05-10
Attending: PSYCHIATRY & NEUROLOGY
Payer: COMMERCIAL

## 2021-05-10 PROCEDURE — 90853 GROUP PSYCHOTHERAPY: CPT | Mod: 95

## 2021-05-10 PROCEDURE — 90853 GROUP PSYCHOTHERAPY: CPT | Mod: GT | Performed by: COUNSELOR

## 2021-05-10 PROCEDURE — 90853 GROUP PSYCHOTHERAPY: CPT | Mod: GT | Performed by: PSYCHOLOGIST

## 2021-05-10 NOTE — GROUP NOTE
Psychotherapy Group Note    PATIENT'S NAME: Jailene Breen  MRN:   [id number]  :   1965  ACCT. NUMBER: [id number]  DATE OF SERVICE: 5/10/21  START TIME:  9:00 AM  END TIME:  9:50 AM  FACILITATOR: Kiara Daugherty LICSW  TOPIC: MH EBP Group: Emotions Management  [facility] Adult Mental Health Day Treatment  TRACK: 2A    Telemedicine Visit: The patient's condition can be safely assessed and treated via synchronous audio and visual telemedicine encounter.      Reason for Telemedicine Visit: Services only offered telehealth    Originating Site (Patient Location): Patient's home    Distant Site (Provider Location): [facility]: [facility]    Consent:  The patient/guardian has verbally consented to: the potential risks and benefits of telemedicine (video visit) versus in person care; bill my insurance or make self-payment for services provided; and responsibility for payment of non-covered services.     Mode of Communication:  Video Conference via zoom    As the provider I attest to compliance with applicable laws and regulations related to telemedicine.      NUMBER OF PARTICIPANTS: 7    Summary of Group / Topics Discussed:  Emotions Management: Mood Tracking: Patients discussed and reviewed different resources to track one s mood, with a goal of identifying patterns and correlations between different factors and mood state.  Patients discussed ways to increase awareness of one s mood and how it may be impacted by environmental factors, diet, activity level, medication, etc. The group shared their experiences and thought processes for feedback.      Patient Session Goals / Objectives:    Increase awareness of daily mood patterns/changes    Report out identified factors that impact their mood    Demonstrate understanding of how to use different resources to track mood, and effectively use these to help manage symptoms      Patient Participation / Response:  Moderately participated, sharing  some personal reflections / insights and adequately adequately received / provided feedback with other participants.    Demonstrated understanding of topics discussed through group discussion and participation    Treatment Plan:  Patient has a current master individualized treatment plan.  See Epic treatment plan for more information.    Kiara Mccray, LICSW

## 2021-05-10 NOTE — GROUP NOTE
Psychoeducation Group Note    PATIENT'S NAME: Jailene Breen  MRN:   [id number]  :   1965  ACCT. NUMBER: [id number]  DATE OF SERVICE: 5/10/21  START TIME: 11:00 AM  END TIME: 11:50 AM  FACILITATOR: Vale Pepe LPCC  TOPIC: ALTAF RN Group: Mental Health Maintenance  [facility] Adult Mental Health Day Treatment  TRACK: 2A    NUMBER OF PARTICIPANTS: 6    Summary of Group / Topics Discussed:  Mental Health Maintenance:  Stigma: In this group patients explored stigma surrounding a mental health diagnosis.  The group discussed the way stigma impacts their own life, and discussed strategies to reduce. The relationship between physical and mental health were also explored in the context of healthcare access, treatment, and support.    Patient Session Goals / Objectives:  ? Patients identified the importance of practicing emotional hygiene  ? Patients identified ways to decrease the  impact of stigma in their own life    Telemedicine Visit: The patient's condition can be safely assessed and treated via synchronous audio and visual telemedicine encounter.      Reason for Telemedicine Visit: Services only offered telehealth and due to COVID-19    Originating Site (Patient Location): Patient's home    Distant Site (Provider Location): Provider Remote Setting    Consent:  The patient/guardian has verbally consented to: the potential risks and benefits of telemedicine (video visit) versus in person care; bill my insurance or make self-payment for services provided; and responsibility for payment of non-covered services.     Mode of Communication:  Video Conference via [employer]    As the provider I attest to compliance with applicable laws and regulations related to telemedicine.        Patient Participation / Response:  Fully participated with the group by sharing personal reflections / insights and openly received / provided feedback with other participants.    Demonstrated understanding of topics  discussed through group discussion and participation, Identified / Expressed personal readiness to practice skills and Verbalized understanding of mental health maintenance topic    Treatment Plan:  Patient has a current master individualized treatment plan.  See Epic treatment plan for more information.    Vale Pepe, LPCC

## 2021-05-10 NOTE — GROUP NOTE
"Process Group Note    PATIENT'S NAME: Jailene Breen  MRN:   [id number]  :   1965  ACCT. NUMBER: [id number]  DATE OF SERVICE: 5/10/21  START TIME: 10:00 AM  END TIME: 10:50 AM  FACILITATOR: Fredo Angelo LP  TOPIC:  Process Group    Diagnoses:  Major Depressive Disorder, recurrent, moderate  JEFF  Panic Disorder    [facility] Day Treatment  TRACK: 2A    NUMBER OF PARTICIPANTS: 5    Telemedicine Visit: The patient's condition can be safely assessed and treated via synchronous audio and visual telemedicine encounter.      Reason for Telemedicine Visit: Services only offered telehealth    Originating Site (Patient Location): Patient's home    Distant Site (Provider Location): Provider Remote Setting    Consent:  The patient/guardian has verbally consented to: the potential risks and benefits of telemedicine (video visit) versus in person care; bill my insurance or make self-payment for services provided; and responsibility for payment of non-covered services.     Mode of Communication:  Video Conference via [employer]    As the provider I attest to compliance with applicable laws and regulations related to telemedicine.           Data:    Session content: At the start of this group, patients were invited to check in by identifying themselves, describing their current emotional status, and identifying issues to address in this group.   Area(s) of treatment focus addressed in this session included Symptom Management, Personal Safety, Community Resources/Discharge Planning, Abstinence/Relapse Prevention, Develop / Improve Independent Living Skills and Develop Socialization / Interpersonal Relationship Skills.    Pt reports stress with his family over Mother's Day since his mother would not tell him she loves him. Pt reports his mother has grief issues she has not dealt with. She will not get help, and it \"leaks into everything else.\"     Pt reports low mood and " sadness. Pt denies S/I.     Therapeutic Interventions/Treatment Strategies:  Psychotherapist offered support, feedback and validation. Treatment modalities used include Cognitive Behavioral Therapy. Interventions include Relationship Skills: Assisted patients in implementing more effective communication skills in their relationships and Discussed strategies to promote healthier understanding of interpersonal relationships.    Assessment:    Patient response:   Patient responded to session by accepting feedback, giving feedback, listening, focusing on goals, being attentive, accepting support and appearing alert    Possible barriers to participation / learning include: and no barriers identified    Health Issues:   None reported       Substance Use Review:   Substance Use: No active concerns identified.    Mental Status/Behavioral Observations  Appearance:   Appropriate   Eye Contact:   Good   Psychomotor Behavior: Normal   Attitude:   Cooperative   Orientation:   All  Speech   Rate / Production: Normal    Volume:  Normal   Mood:    Anxious  Depressed  Sad   Affect:    Appropriate   Thought Content:   Clear and Safety denies any current safety concerns including suicidal ideation, self-harm, and homicidal ideation  Thought Form:  Coherent  Logical     Insight:    Good     Plan:     Safety Plan: Recommended that patient call 911 or go to the local ED should there be a change in any of these risk factors.     Barriers to treatment: None identified    Patient Contracts (see media tab):  None    Substance Use: Not addressed in session     Continue or Discharge: Patient will continue in Adult Day Treatment (ADT)  as planned. Patient is likely to benefit from learning and using skills as they work toward the goals identified in their treatment plan.      Fredo Angelo LP  May 10, 2021

## 2021-05-11 ENCOUNTER — TELEPHONE (OUTPATIENT)
Dept: BEHAVIORAL HEALTH | Facility: CLINIC | Age: 56
End: 2021-05-11

## 2021-05-11 DIAGNOSIS — M06.9 RHEUMATOID ARTHRITIS (H): ICD-10-CM

## 2021-05-12 DIAGNOSIS — Z11.59 ENCOUNTER FOR SCREENING FOR OTHER VIRAL DISEASES: ICD-10-CM

## 2021-05-13 DIAGNOSIS — Z11.59 ENCOUNTER FOR SCREENING FOR OTHER VIRAL DISEASES: ICD-10-CM

## 2021-05-13 NOTE — TELEPHONE ENCOUNTER
etanercept (ENBREL SURECLICK) 50 MG/ML autoinjector Inject 50 mg Subcutaneous once a week   Last Written Prescription Date:  2/18/2021  Last Fill Quantity: 50 mg ,   # refills: 2  Last Office Visit: 12/17/20 [facility]  Future Office visit:  2 months  Saw Dr.Vaani Raygoza/ Rheumatology on 4/2/2021  [facility]      Labs done 3/21/2021    Routing refill request to provider for review/approval because:  Confirm continuation of care vs transfer back [place] ( had seen this provider last in 4/2019)

## 2021-05-17 ENCOUNTER — HOSPITAL ENCOUNTER (OUTPATIENT)
Dept: BEHAVIORAL HEALTH | Facility: CLINIC | Age: 56
End: 2021-05-17
Attending: PSYCHIATRY & NEUROLOGY
Payer: COMMERCIAL

## 2021-05-17 ENCOUNTER — MYC MEDICAL ADVICE (OUTPATIENT)
Dept: PALLIATIVE MEDICINE | Facility: CLINIC | Age: 56
End: 2021-05-17

## 2021-05-17 DIAGNOSIS — M54.41 CHRONIC BILATERAL LOW BACK PAIN WITH RIGHT-SIDED SCIATICA: ICD-10-CM

## 2021-05-17 DIAGNOSIS — F11.20 UNCOMPLICATED OPIOID DEPENDENCE (H): ICD-10-CM

## 2021-05-17 DIAGNOSIS — M25.551 HIP PAIN, RIGHT: ICD-10-CM

## 2021-05-17 DIAGNOSIS — G89.29 CHRONIC BILATERAL LOW BACK PAIN WITH RIGHT-SIDED SCIATICA: ICD-10-CM

## 2021-05-17 DIAGNOSIS — M51.369 DDD (DEGENERATIVE DISC DISEASE), LUMBAR: ICD-10-CM

## 2021-05-17 DIAGNOSIS — M45.7 ANKYLOSING SPONDYLITIS OF LUMBOSACRAL REGION (H): ICD-10-CM

## 2021-05-17 DIAGNOSIS — M05.79 RHEUMATOID ARTHRITIS INVOLVING MULTIPLE SITES WITH POSITIVE RHEUMATOID FACTOR (H): ICD-10-CM

## 2021-05-17 DIAGNOSIS — M54.59 LUMBAR FACET JOINT PAIN: ICD-10-CM

## 2021-05-17 PROCEDURE — 90853 GROUP PSYCHOTHERAPY: CPT | Mod: GT | Performed by: SOCIAL WORKER

## 2021-05-17 PROCEDURE — 90853 GROUP PSYCHOTHERAPY: CPT | Mod: GT | Performed by: PSYCHOLOGIST

## 2021-05-17 NOTE — GROUP NOTE
Process Group Note  Telemedicine Visit: The patient's condition can be safely assessed and treated via synchronous audio and visual telemedicine encounter.    Reason for Telemedicine Visit: Patient has requested telehealth visit  Originating Site (Patient Location): Patient's home  Distant Site (Provider Location): [facility] Outpatient Setting: Adult Day Tx  Consent:  The patient/guardian has verbally consented to: the potential risks and benefits of telemedicine (video visit) versus in person care; bill my insurance or make self-payment for services provided; and responsibility for payment of non-covered services.   Mode of Communication:  Video Conference via Fash[employer]  As the provider I attest to compliance with applicable laws and regulations related to telemedicine.    PATIENT'S NAME: Jailene Breen  MRN:   [id number]  :   1965  ACCT. NUMBER: [id number]  DATE OF SERVICE: 21  START TIME: 10:00 AM  END TIME: 10:50 AM  FACILITATOR: Josefina Beauchamp PsyD  TOPIC: MH Process Group    Diagnoses:    Major Depressive Disorder, recurrent, moderate  JEFF  Panic Disorder    Rheumatoid Arthritis    Fredo Hughes MD MD Orthopedics [address] End  21   Phone: [phone]; Fax: [phone]      Aiden Resendez PA  Assigned PCP  2020   Namita Sharma MD Resident Student in organized health care education/training program 2020 End  20   Phone: [phone]; Fax: [phone]      Barbie Christie, PhD LP Psychologist Licensed Mental Health 2020 End  20   Phone: [phone]; Fax: [phone]      Comment: supervising      Susana Pike APRN CNP Referring Physician Nurse Practitioner 2020 End  20   Phone: [phone]; Fax: [phone]      Raghu Parada MD  Assigned Rheumatology Provider  10/23/2020   10/28/20            Chanelle Solo MD MD Psychiatry 2021 End  21   Phone: [phone]; Fax:  [phone]      Comment: Attending      Coffin, Richard Breyen, MD Resident Psychiatry 02/24/2021 End  2/24/21   Phone: [phone]; Fax: [phone]            [facility] Adult Mental Health Day Treatment  TRACK: 6    NUMBER OF PARTICIPANTS: 6          Data:    Session content: At the start of this group, patients were invited to check in by identifying themselves, describing their current emotional status, and identifying issues to address in this group.   Area(s) of treatment focus addressed in this session included Symptom Management, Personal Safety and Community Resources/Discharge Planning.    Client reported being safe today.  Reported mood is depressed.   Goal for today is to attend therapy groups.  The client talked to the group about how he feels bad, because he lost his job adue to his physical health. He reported that he got a denial letter from [employer] and will need to schedule a court date. The group validated his feelings and encouraged him to continue to work with his doctors and psychiatrist.      Therapeutic Interventions/Treatment Strategies:  Psychotherapist reinforced use of skills. Treatment modalities used include Cognitive Behavioral Therapy and Dialectical Behavioral Therapy. Interventions include Cognitive Restructuring:  Assisted patient in formulating new neutral/positive alternatives to challenge less helpful / ineffective thoughts, Coping Skills: Assisted patient in understanding the purpose of planning / creating / participating / sharing in positive experiences, Mindfulness: Facilitated discussion of when/how to use mindfulness skills to benefit general health, mental health symptoms, and stressors and Symptoms Management: Promoted understanding of their diagnoses and how it impacts their functioning.    Assessment:    Patient response:   Patient responded to session by giving feedback, listening and focusing on goals    Possible barriers to participation /  learning include: severity of symptoms    Health Issues:   Yes: Pain, Associated Psychological Distress       Substance Use Review:   Substance Use: No active concerns identified.    Mental Status/Behavioral Observations  Appearance:   Appropriate   Eye Contact:   Good   Psychomotor Behavior: Normal   Attitude:   Cooperative   Orientation:   All  Speech   Rate / Production: Normal    Volume:  Normal   Mood:    Anxious  Depressed  Sad   Affect:    Constricted   Thought Content:   Rumination  Thought Form:  Coherent  Logical     Insight:    Good     Plan:     Safety Plan: Recommended that patient call 911 or go to the local ED should there be a change in any of these risk factors.     Barriers to treatment: None identified    Patient Contracts (see media tab):  None    Substance Use: Provided encouragement towards sobriety     Continue or Discharge: Patient will continue in Adult Day Treatment (ADT)  as planned. Patient is likely to benefit from learning and using skills as they work toward the goals identified in their treatment plan.      Josefina Beauchamp PsyD  May 17, 2021  Elpidio Blake, TIRSO,  Licensed Clinical Psychologist

## 2021-05-17 NOTE — GROUP NOTE
Psychotherapy Group Note    PATIENT'S NAME: Jailene Breen  MRN:   [id number]  :   1965  ACCT. NUMBER: [id number]  DATE OF SERVICE: 21  START TIME: 11:00 AM  END TIME: 11:50 AM  FACILITATOR: Liliya Oh LICSW  TOPIC: MH EBP Group: Relationship Skills  [facility] Adult Mental Health Day Treatment  TRACK: 2A    NUMBER OF PARTICIPANTS: 6    Summary of Group / Topics Discussed:  Relationship Skills: Dependencies: Patients were provided with a general overview of different types of dependencies and how they relate to symptoms and functioning. The purpose is to help patients identify the different types of dependencies, how they may be impacted by them, and to gain awareness and skills to work towards healthier relationships.    Patient Session Goals / Objectives:    Familiarized patients with the concept of interpersonal relationships and their characteristics.      Discussed and practiced strategies to promote healthier understanding of interpersonal relationships with a focus on dependencies    Identified types of dependencies in patient s lives and how they impact their symptoms and functioning  Telemedicine Visit: The patient's condition can be safely assessed and treated via synchronous audio and visual telemedicine encounter.      Reason for Telemedicine Visit: Services only offered telehealth    Originating Site (Patient Location): Patient's home    Distant Site (Provider Location): Provider Remote Setting    Consent:  The patient/guardian has verbally consented to: the potential risks and benefits of telemedicine (video visit) versus in person care; bill my insurance or make self-payment for services provided; and responsibility for payment of non-covered services.     Mode of Communication:  Video Conference via CatchMe!    As the provider I attest to compliance with applicable laws and regulations related to telemedicine.       Patient Participation / Response:  Fully  participated with the group by sharing personal reflections / insights and openly received / provided feedback with other participants.    Identified / Expressed personal readiness to incorporate effective communication skills    Treatment Plan:  Patient has a current master individualized treatment plan.  See Epic treatment plan for more information.    Liliya Oh, [facility]

## 2021-05-17 NOTE — TELEPHONE ENCOUNTER
From: Jamison Breen      Created: 5/17/2021 9:06 AM        *-*-*This message has not been handled.*-*-*    Hi, could you please refill the suboxone and send it to [facility] pharmacy in [place].        Will forward to MA and Nurse pool to process refill    Josias Lopez, RN  Care Coordinator   [facility]

## 2021-05-17 NOTE — TELEPHONE ENCOUNTER
Pt being seen elsewhere.     KERLINE SmallsN, RN   Medical Specialty Care Coordinator   [facility]

## 2021-05-17 NOTE — TELEPHONE ENCOUNTER
Received call from patient requesting refill(s) of Suboxone     Last dispensed from pharmacy on 5/4/21    Patient's last office/virtual visit by prescribing provider on 4/21/21  Next office/virtual appointment scheduled for none    Last urine drug screen date 3/21/21  Current opioid agreement on file (completed within the last year) No Date of opioid agreement: 1/21/20    E-prescribe to [facility] pharmacy    Will route to nursing [facility] for review and preparation of prescription(s).

## 2021-05-17 NOTE — TELEPHONE ENCOUNTER
Medication refill information reviewed.     Due date for Suboxone  is 5/18/21     Prescriptions prepped for review.     Will route to provider.     Josias Lopez RN  Care Coordinator   [facility]

## 2021-05-17 NOTE — GROUP NOTE
Psychotherapy Group Note    PATIENT'S NAME: Jailene Breen  MRN:   [id number]  :   1965  ACCT. NUMBER: [id number]  DATE OF SERVICE: 21  START TIME:  9:00 AM  END TIME:  9:50 AM  FACILITATOR: Liliya Oh LICSW  TOPIC: MH EBP Group: Self-Awareness  [facility] Mental Health Day Treatment  TRACK: 2A    NUMBER OF PARTICIPANTS: 5    Summary of Group / Topics Discussed:  Self-Awareness: Grief: Patients were provided with an overview of how personal losses impact their thoughts, feelings, and behaviors. Different stages of grief were discussed, with a focus on the personal and individual experiences of grief as a natural response to loss. The relationship between grief, depression, and anxiety was also discussed. Patients were provided with information regarding different ways of processing grief and shared their personal experiences.     Patient Session Goals / Objectives:    Defined and explored the concept of grief and the grieving process    Discussed relationship between grief, depression, and anxiety     Normalized and recognized the purpose/benefits of the grieving process    Discussed management of the thoughts and feelings associated with grief  Telemedicine Visit: The patient's condition can be safely assessed and treated via synchronous audio and visual telemedicine encounter.      Reason for Telemedicine Visit: Services only offered telehealth    Originating Site (Patient Location): Patient's home    Distant Site (Provider Location): Provider Remote Setting    Consent:  The patient/guardian has verbally consented to: the potential risks and benefits of telemedicine (video visit) versus in person care; bill my insurance or make self-payment for services provided; and responsibility for payment of non-covered services.     Mode of Communication:  Video Conference via RatePoint    As the provider I attest to compliance with applicable laws and regulations related to  telemedicine.       Patient Participation / Response:  Fully participated with the group by sharing personal reflections / insights and openly received / provided feedback with other participants.    Identified / Expressed readiness to act intentionally, increase self-compassion, promote personal growth    Treatment Plan:  Patient has a current master individualized treatment plan.  See Epic treatment plan for more information.    Liliya Oh, LICSW

## 2021-05-17 NOTE — PROGRESS NOTES
Acknowledgement of Current Treatment Plan       I have reviewed my treatment plan with my therapist / counselor on 5/17/2021  .   I agree with the plan as it is written in the electronic health record. (2A)    Name:      Signature:  Jailene Breen Unable to sign due to COVID19       Dr Saul Quesada MD  Psychiatrist    Josefina Beauchamp Psy.D,  Licensed Psychologist   Elpidio Blake, D,  Licensed Clinical Psychologist

## 2021-05-18 ENCOUNTER — HOSPITAL ENCOUNTER (OUTPATIENT)
Dept: BEHAVIORAL HEALTH | Facility: CLINIC | Age: 56
End: 2021-05-18
Attending: PSYCHIATRY & NEUROLOGY
Payer: COMMERCIAL

## 2021-05-18 ENCOUNTER — MYC MEDICAL ADVICE (OUTPATIENT)
Dept: PALLIATIVE MEDICINE | Facility: CLINIC | Age: 56
End: 2021-05-18

## 2021-05-18 ENCOUNTER — MYC MEDICAL ADVICE (OUTPATIENT)
Dept: RHEUMATOLOGY | Facility: CLINIC | Age: 56
End: 2021-05-18

## 2021-05-18 PROCEDURE — 90853 GROUP PSYCHOTHERAPY: CPT | Mod: GT

## 2021-05-18 RX ORDER — BUPRENORPHINE AND NALOXONE 8; 2 MG/1; MG/1
FILM, SOLUBLE BUCCAL; SUBLINGUAL
Qty: 30 EACH | Refills: 0 | Status: SHIPPED | OUTPATIENT
Start: 2021-05-18 | End: 2021-06-01

## 2021-05-18 NOTE — TELEPHONE ENCOUNTER
Signed Prescriptions:                        Disp   Refills    buprenorphine HCl-naloxone HCl (SUBOXONE) *30 each0        Sig: Use 0.5 film under the tongue 4 times per day for           pain, approximately every 6 hours. Max 2 films           per day. Fill 5/17/21 and begin 5/18/2021.  15           day supply  Authorizing Provider: SUSANA PETTY    Reviewed [place]  May 18, 2021- no concerning fills.    Susana Petty APRN, RN CNP, FNP  [facility]  [facility]

## 2021-05-18 NOTE — GROUP NOTE
Process Group Note    PATIENT'S NAME: Jailene Breen  MRN:   [id number]  :   1965  ACCT. NUMBER: [id number]  DATE OF SERVICE: 21  START TIME:  9:00 AM  END TIME:  9:50 AM  FACILITATOR: Kiara Daugherty LICSW  TOPIC:  Process Group    Diagnoses:  Principal DSM5 Diagnoses  (Sustained by DSM5 Criteria Listed Above):   296.33 (F33.2) Major Depressive Disorder, Recurrent Episode, Severe _ and With mixed features.  4. Other Diagnoses that is relevant to services:   300.02 (F41.1) Generalized Anxiety Disorder.  5. Provisional Diagnosis:  309.81 (F43.10) Posttraumatic Stress Disorder (includes Posttraumatic Stress Disorder for Children 6 Years and Younger)  Without dissociative symptoms as evidenced by met criteria including: hypervigilence, arousal, flashbacks, nightmares, impaired functioning, multiple traumas and losses .      [facility] Adult Mental Health Day Treatment  TRACK: 2A    Telemedicine Visit: The patient's condition can be safely assessed and treated via synchronous audio and visual telemedicine encounter.      Reason for Telemedicine Visit: Services only offered telehealth    Originating Site (Patient Location): Patient's home    Distant Site (Provider Location): [facility]    Consent:  The patient/guardian has verbally consented to: the potential risks and benefits of telemedicine (video visit) versus in person care; bill my insurance or make self-payment for services provided; and responsibility for payment of non-covered services.     Mode of Communication:  Video Conference via zoom    As the provider I attest to compliance with applicable laws and regulations related to telemedicine.      NUMBER OF PARTICIPANTS: 6          Data:    Session content: At the start of this group, patients were invited to check in by identifying themselves, describing their current emotional status, and identifying issues to address in this group.   Area(s) of treatment focus  "addressed in this session included Symptom Management and Personal Safety.  Pt reports continued depression, low energy, low self-worth and a desire to \"just sleep'. Pt is sad that he is home alone today, as his wife returned to work. He feels grateful that she has a job, but guilty because he is not working. Pt is worried because he cannot afford an anniversary gift for her next week. He received ideas from the group. Pt sets goals to take a nap and sit on the deck today. Denies safety concerns.    Therapeutic Interventions/Treatment Strategies:  Psychotherapist offered support, feedback and validation and reinforced use of skills. Treatment modalities used include Cognitive Behavioral Therapy. Interventions include Coping Skills: Discussed use of self-soothe skills to decrease distress in the body and Assisted patient in identifying 1-2 healthy distraction skills to reduce overall distress and Symptoms Management: Promoted understanding of their diagnoses and how it impacts their functioning.    Assessment:    Patient response:   Patient responded to session by accepting feedback, listening, focusing on goals, being attentive and accepting support    Possible barriers to participation / learning include: and no barriers identified    Health Issues:   Yes: Pain, Associated Psychological Distress       Substance Use Review:   Substance Use: No active concerns identified.    Mental Status/Behavioral Observations  Appearance:   Appropriate   Eye Contact:   Good   Psychomotor Behavior: Retarded (Slowed)   Attitude:   Cooperative   Orientation:   All  Speech   Rate / Production: Normal    Volume:  Normal   Mood:    Anxious  Depressed   Affect:    Flat   Thought Content:   Rumination  Thought Form:  Coherent  Obsessive     Insight:    Good     Plan:     Safety Plan: No current safety concerns identified.  Recommended that patient call 911 or go to the local ED should there be a change in any of these risk factors. "     Barriers to treatment: None identified    Patient Contracts (see media tab):  None    Substance Use: Not addressed in session     Continue or Discharge: Patient will continue in Adult Day Treatment (ADT)  as planned. Patient is likely to benefit from learning and using skills as they work toward the goals identified in their treatment plan.      Kiara Mccray, [facility]  May 18, 2021

## 2021-05-18 NOTE — TELEPHONE ENCOUNTER
See the 5/17/21 mychart encounter for more information.    Cha Molina RN-BSN  [facility]

## 2021-05-18 NOTE — ADDENDUM NOTE
Encounter addended by: Liliya Oh, [facility] on: 5/18/2021 4:45 PM   Actions taken: Flowsheet accepted

## 2021-05-18 NOTE — PROGRESS NOTES
Patient Active Problem List   Diagnosis     CARDIOVASCULAR SCREENING; LDL GOAL LESS THAN 160     Anxiety     Overweight (BMI 25.0-29.9)     Back pain     Tear meniscus knee, right, initial encounter     Rheumatoid arthritis involving multiple sites, unspecified rheumatoid factor presence     Chronic pain     Right-sided thoracic back pain, unspecified chronicity     JEFF (generalized anxiety disorder)     Panic attack     Syncope, unspecified syncope type     Ankylosing spondylitis (H)     Tobacco use disorder     Moderate major depression (H)     Immunocompromised (H)     Essential hypertension     Suicidal ideation     Insomnia due to other mental disorder     Suicide ideation     Depression, unspecified depression type     Major depressive disorder, recurrent episode, severe with mixed features (H)       Current Outpatient Medications:      acetaminophen (TYLENOL) 500 MG tablet, Take 1,000 mg by mouth every 6 hours as needed for mild pain (headache), Disp: , Rfl:      ARIPiprazole (ABILIFY) 5 MG tablet, Take 1 tablet (5 mg) by mouth daily, Disp: 30 tablet, Rfl: 0     atenolol (TENORMIN) 25 MG tablet, Take 1 tablet (25 mg) by mouth daily, Disp: 90 tablet, Rfl: 1     buprenorphine HCl-naloxone HCl (SUBOXONE) 8-2 MG per film, Use 0.5 film under the tongue 4 times per day for pain, approximately every 6 hours. Max 2 films per day. Fill and begin 5/3/2021.  15 day supply, Disp: 30 each, Rfl: 0     chlorzoxazone (PARAFON FORTE) 500 MG tablet, Take 1 tablet (500 mg) by mouth 3 times daily as needed for muscle spasms, Disp: 45 tablet, Rfl: 1     cholecalciferol (VITAMIN D3) 125 mcg (5000 units) capsule, Take 125 mcg by mouth daily, Disp: , Rfl:      clonazePAM (KLONOPIN) 1 MG tablet, TAKE ONE-HALF - 1 TABLET BY MOUTH ONCE DAILY AS NEEDED FOR ANXIETY, Disp: 30 tablet, Rfl: 0     diclofenac (VOLTAREN) 1 % topical gel, Apply 2 g topically 4 times daily, Disp: 50 g, Rfl: 0     etanercept (ENBREL SURECLICK) 50 MG/ML  autoinjector, Inject 50 mg Subcutaneous once a week (Patient taking differently: Inject 50 mg Subcutaneous once a week Every Tuesday), Disp: 50 mg, Rfl: 2     fish oil-omega-3 fatty acids 1000 MG capsule, Take 1 g by mouth daily, Disp: , Rfl:      FLUoxetine (PROZAC) 40 MG capsule, Take 1 capsule (40 mg) by mouth daily, Disp: 30 capsule, Rfl: 0     folic acid (FOLVITE) 1 MG tablet, Take 5 tablets (5 mg) by mouth daily, Disp: 150 tablet, Rfl: 3     Lidocaine (LIDOCARE) 4 % Patch, Place 1-3 patches onto the skin every 24 hours To prevent lidocaine toxicity, patient should be patch free for 12 hrs daily., Disp: 90 patch, Rfl: 3     lidocaine (XYLOCAINE) 5 % external ointment, Apply topically every 4 hours as needed for moderate pain, Disp: 50 g, Rfl: 0     liothyronine (CYTOMEL) 25 MCG tablet, Take 1 tablet (25 mcg) by mouth daily, Disp: 30 tablet, Rfl: 3     medical cannabis (Patient's own supply), See Admin Instructions (The purpose of this order is to document that the patient reports taking medical cannabis.  This is not a prescription, and is not used to certify that the patient has a qualifying medical condition.), Disp: , Rfl:      menthol, Topical Analgesic, 2.5% (BENGAY VANISHIN SCENT) 2.5 % GEL topical gel, Apply topically every 6 hours as needed for moderate pain, Disp: 45 g, Rfl: 3     methotrexate sodium 2.5 MG TABS, Take 4 tablets (10 mg) by mouth every 7 days (Patient taking differently: Take 4 tablets by mouth every 7 days Every tuesday), Disp: 48 tablet, Rfl: 0     multivitamin (CENTRUM SILVER) tablet, Take 1 tablet by mouth daily, Disp: , Rfl:      nicotine polacrilex (NICORETTE) 4 MG gum, PLACE 1 PIECE INSIDE THE CHEEK AS NEEDED FOR SMOKING CESSATION, Disp: 200 each, Rfl: 3     omeprazole (PRILOSEC) 20 MG DR capsule, Take 20 mg by mouth daily , Disp: , Rfl:      prazosin (MINIPRESS) 1 MG capsule, Take 1 capsule (1 mg) by mouth At Bedtime, Disp: 30 capsule, Rfl: 3     traZODone (DESYREL) 50 MG tablet,  Take 0.5 tablets (25 mg) by mouth At Bedtime . With additional 0.25 tablet (12.5mg) as needed daily for anxiety. (Patient taking differently: Take 50 mg by mouth At Bedtime ), Disp: 30 tablet, Rfl: 1  Psychiatry staffing: case discussed  Diagnosis:  As above;  Medical concerns noted.  Sober now.  Mood low and pain persist

## 2021-05-18 NOTE — TELEPHONE ENCOUNTER
Per patient myChart message:  From: Jamison Breen      Created: 5/18/2021 10:51 AM      I'm out of my suboxone and have sent a request in. can  you please fill and send to [facility] pharmacy in [place], thank you.        _________________________________    Mychart sent to pt:  From: Cha Molina RN      Created: 5/18/2021 11:51 AM      Devante Swift,  We did receive your refill request yesterday. It is still in process.  JUANITA Ramon CNP is off on Mondays and Thursdays.  As you know, in the future please contact us earlier so we can get your refills done on time for you.  We will let you know when they are sent on.     Cha RN-BSN  [facility]

## 2021-05-18 NOTE — GROUP NOTE
Psychotherapy Group Note    PATIENT'S NAME: Jailene Breen  MRN:   [id number]  :   1965  ACCT. NUMBER: [id number]  DATE OF SERVICE: 21  START TIME: 10:00 AM  END TIME: 10:50 AM  FACILITATOR: Kiara Daugherty LICSW  TOPIC: MH EBP Group: Coping Skills  [facility] Adult Mental Health Day Treatment  TRACK: 2A    Telemedicine Visit: The patient's condition can be safely assessed and treated via synchronous audio and visual telemedicine encounter.      Reason for Telemedicine Visit: Services only offered telehealth    Originating Site (Patient Location): Patient's home    Distant Site (Provider Location): [facility]: [facility]    Consent:  The patient/guardian has verbally consented to: the potential risks and benefits of telemedicine (video visit) versus in person care; bill my insurance or make self-payment for services provided; and responsibility for payment of non-covered services.     Mode of Communication:  Video Conference via zoom    As the provider I attest to compliance with applicable laws and regulations related to telemedicine.      NUMBER OF PARTICIPANTS: 6    Summary of Group / Topics Discussed:  Coping Skills: Grounding: Patients discussed and practiced strategies to increase attachment / presence to the current moment.  Patients identified situations in which using these strategies will help improve emotion regulation sense of calm in the body.  Reviewed the benefits of applying grounding strategies, as well as past / current practices of each member.  Patients identified situations in which using these strategies would reduce stress. They developed the ability to distinguish when these strategies can be useful in their lives for management and stress and psychological well-being.    Patient Session Goals / Objectives:    Understand the purpose of using grounding strategies to reduce stress.    Verbalize understanding of how and when to apply grounding strategies to  reduce distress and increase presence in the moment.    Review patients current grounding practices and discuss a more formal way of practicing and accessing skills.    Practice using various calming strategies (e.g. 5-4-3-2-1; mental and body awareness).    Choose 1-2 grounding strategies to apply during times of distress.        Patient Participation / Response:  Minimally participated, only when prompted / asked.    Demonstrated understanding of topics discussed through group discussion and participation    Treatment Plan:  Patient has a current master individualized treatment plan.  See Epic treatment plan for more information.    Kiara Mccray, WALLYSW

## 2021-05-19 ENCOUNTER — PATIENT OUTREACH (OUTPATIENT)
Dept: GASTROENTEROLOGY | Facility: CLINIC | Age: 56
End: 2021-05-19

## 2021-05-19 ENCOUNTER — MYC MEDICAL ADVICE (OUTPATIENT)
Dept: RHEUMATOLOGY | Facility: CLINIC | Age: 56
End: 2021-05-19

## 2021-05-19 ENCOUNTER — TELEPHONE (OUTPATIENT)
Dept: FAMILY MEDICINE | Facility: CLINIC | Age: 56
End: 2021-05-19

## 2021-05-19 ENCOUNTER — TELEPHONE (OUTPATIENT)
Dept: GASTROENTEROLOGY | Facility: CLINIC | Age: 56
End: 2021-05-19

## 2021-05-19 DIAGNOSIS — F33.2 SEVERE RECURRENT MAJOR DEPRESSION WITHOUT PSYCHOTIC FEATURES (H): Primary | ICD-10-CM

## 2021-05-19 DIAGNOSIS — M06.9 RHEUMATOID ARTHRITIS (H): ICD-10-CM

## 2021-05-19 NOTE — LETTER
May 27, 2021          Jailene Breen  [address]   [place] [address]            Dear Jailene Breen,      At [facility] we care about your health and are committed to providing quality patient care. Regular appointments are a vital part of the care and management of your health and can help prevent many of the complications that can occur.      It has come to our attention that you are due for a blood pressure check.  Please call [facility] at [phone] soon to schedule your follow up appointment.    If you have transferred care to another clinic please call to inform us so that we do not continue to send you reminder letters.      Sincerely,      [facility] Care Team

## 2021-05-19 NOTE — TELEPHONE ENCOUNTER
etanercept (ENBREL SURECLICK) 50 MG/ML autoinjector  Inject 50 mg Subcutaneous once a week   Last Written Prescription Date:  2/18/2021  Last Fill Quantity: 50 mg ,   # refills: 2   Last Office Visit: 12/17/20 [facility]  Future Office visit:  2 months  Saw Dr.Vaani Raygoza/ Rheumatology on 4/2/2021  [facility]        previously denied>   Confirm continuation of care vs transfer back to Dr Raygoza @ [facility] ( had seen this provider last in 4/2019)  -  5/18/21 MyChart conversation with Rheum nursing staff> Per patient- he saw [facility] provider one time before she left [facility].  Last CBC/BMP 3/21/2021. Refill x 30 days  Scheduling has been notified to contact the pt for appointment.

## 2021-05-19 NOTE — TELEPHONE ENCOUNTER
Patient Quality Outreach      Summary:    Patient has the following on his problem list/HM:   Hypertension   Last three blood pressure readings:  BP Readings from Last 3 Encounters:   04/06/21 (!) 137/90   03/27/21 119/81   03/18/21 123/79     Blood pressure: Failed    HTN Guidelines:  ? 139/89     Patient is due/failing the following:   BP check    Type of outreach:        Questions for provider review:    None                                                                                                                                          Chart routed to Care Team.

## 2021-05-20 ENCOUNTER — MYC REFILL (OUTPATIENT)
Dept: FAMILY MEDICINE | Facility: CLINIC | Age: 56
End: 2021-05-20

## 2021-05-20 ENCOUNTER — MYC MEDICAL ADVICE (OUTPATIENT)
Dept: FAMILY MEDICINE | Facility: CLINIC | Age: 56
End: 2021-05-20

## 2021-05-20 DIAGNOSIS — F41.1 GAD (GENERALIZED ANXIETY DISORDER): ICD-10-CM

## 2021-05-20 RX ORDER — CLONAZEPAM 1 MG/1
TABLET ORAL
Qty: 30 TABLET | Refills: 0 | Status: CANCELLED | OUTPATIENT
Start: 2021-05-20

## 2021-05-20 NOTE — TELEPHONE ENCOUNTER
Routing refill request to provider for review/approval because:  Drug not on the [facility] refill protocol     Geno Guaman RN  [facility]

## 2021-05-21 RX ORDER — CLONAZEPAM 1 MG/1
TABLET ORAL
Qty: 30 TABLET | Refills: 0 | Status: SHIPPED | OUTPATIENT
Start: 2021-05-21 | End: 2021-06-28

## 2021-05-23 DIAGNOSIS — Z11.59 ENCOUNTER FOR SCREENING FOR OTHER VIRAL DISEASES: ICD-10-CM

## 2021-05-23 LAB
SARS-COV-2 RNA RESP QL NAA+PROBE: NORMAL
SPECIMEN SOURCE: NORMAL

## 2021-05-23 PROCEDURE — U0005 INFEC AGEN DETEC AMPLI PROBE: HCPCS | Performed by: SURGERY

## 2021-05-23 PROCEDURE — U0003 INFECTIOUS AGENT DETECTION BY NUCLEIC ACID (DNA OR RNA); SEVERE ACUTE RESPIRATORY SYNDROME CORONAVIRUS 2 (SARS-COV-2) (CORONAVIRUS DISEASE [COVID-19]), AMPLIFIED PROBE TECHNIQUE, MAKING USE OF HIGH THROUGHPUT TECHNOLOGIES AS DESCRIBED BY CMS-2020-01-R: HCPCS | Performed by: SURGERY

## 2021-05-24 ENCOUNTER — MYC MEDICAL ADVICE (OUTPATIENT)
Dept: FAMILY MEDICINE | Facility: CLINIC | Age: 56
End: 2021-05-24

## 2021-05-24 ENCOUNTER — HOSPITAL ENCOUNTER (OUTPATIENT)
Dept: BEHAVIORAL HEALTH | Facility: CLINIC | Age: 56
End: 2021-05-24
Attending: PSYCHIATRY & NEUROLOGY
Payer: COMMERCIAL

## 2021-05-24 DIAGNOSIS — F17.200 TOBACCO USE DISORDER: ICD-10-CM

## 2021-05-24 PROCEDURE — 90853 GROUP PSYCHOTHERAPY: CPT | Mod: GT | Performed by: SOCIAL WORKER

## 2021-05-24 NOTE — GROUP NOTE
Psychotherapy Group Note    PATIENT'S NAME: Jailene Breen  MRN:   [id number]  :   1965  ACCT. NUMBER: [id number]  DATE OF SERVICE: 21  START TIME:  9:00 AM  END TIME:  9:50 AM  FACILITATOR: Liliya Oh LICSW  TOPIC: MH EBP Group: Coping Skills  [facility] Adult Mental Health Day Treatment  TRACK: 2A    NUMBER OF PARTICIPANTS: 5    Summary of Group / Topics Discussed:  Coping Skills: Building Positive Experiences: Patients discussed the importance of planning and engaging in positive experiences, as strategies to increase positive thinking, hope, and self-worth.  Explored the benefits of planning / creating positive experiences, including recognizing and reducing negativity bias by focusing on and building positive experiences.   Several approaches to building positive experiences were presented and discussed relevant to each patient.      Patient Session Goals / Objectives:    Understand the purpose of planning / creating / participating / sharing in positive experiences.    Explore patient s experiences related to negative thinking and how it influences activities and moodIdentify current positive events in patient s life.     Set goals to increase a variety of positive experiences.    Address barriers to planning / engaging in positive experiences.  Telemedicine Visit: The patient's condition can be safely assessed and treated via synchronous audio and visual telemedicine encounter.      Reason for Telemedicine Visit: Services only offered telehealth    Originating Site (Patient Location): Patient's home    Distant Site (Provider Location): Provider Remote Setting    Consent:  The patient/guardian has verbally consented to: the potential risks and benefits of telemedicine (video visit) versus in person care; bill my insurance or make self-payment for services provided; and responsibility for payment of non-covered services.     Mode of Communication:  Video Conference via  Jameel    As the provider I attest to compliance with applicable laws and regulations related to telemedicine.       Patient Participation / Response:  Fully participated with the group by sharing personal reflections / insights and openly received / provided feedback with other participants.    Expressed understanding of the relevance / importance of coping skills at distressing times in life and Demonstrated knowledge of when to consider using a variety of coping skills in daily life    Treatment Plan:  Patient has a current master individualized treatment plan.  See Epic treatment plan for more information.    Liliya Oh, WALLYSW

## 2021-05-25 ENCOUNTER — HOSPITAL ENCOUNTER (OUTPATIENT)
Dept: BEHAVIORAL HEALTH | Facility: CLINIC | Age: 56
End: 2021-05-25
Attending: PSYCHIATRY & NEUROLOGY
Payer: COMMERCIAL

## 2021-05-25 NOTE — ADDENDUM NOTE
Encounter addended by: Josefina Beauchamp PsyD on: 5/25/2021 5:01 PM   Actions taken: Flowsheet accepted

## 2021-05-25 NOTE — TELEPHONE ENCOUNTER
Routing refill request to provider for review/approval because:  BP Readings from Last 6 Encounters:   04/06/21 (!) 137/90   03/27/21 119/81   03/18/21 123/79   03/16/21 116/71   02/01/21 135/84   01/20/21 (!) 146/96     Viv Guallpa RN

## 2021-05-26 ENCOUNTER — OFFICE VISIT (OUTPATIENT)
Dept: GASTROENTEROLOGY | Facility: CLINIC | Age: 56
End: 2021-05-26
Attending: SURGERY
Payer: COMMERCIAL

## 2021-05-26 VITALS
RESPIRATION RATE: 16 BRPM | OXYGEN SATURATION: 96 % | HEART RATE: 45 BPM | SYSTOLIC BLOOD PRESSURE: 115 MMHG | HEIGHT: 76 IN | TEMPERATURE: 97.5 F | BODY MASS INDEX: 31.66 KG/M2 | DIASTOLIC BLOOD PRESSURE: 63 MMHG | WEIGHT: 260 LBS

## 2021-05-26 DIAGNOSIS — K21.00 HIATAL HERNIA WITH GERD AND ESOPHAGITIS: ICD-10-CM

## 2021-05-26 DIAGNOSIS — K44.9 HIATAL HERNIA WITH GERD AND ESOPHAGITIS: ICD-10-CM

## 2021-05-26 PROCEDURE — 91037 ESOPH IMPED FUNCTION TEST: CPT

## 2021-05-26 PROCEDURE — 91010 ESOPHAGUS MOTILITY STUDY: CPT

## 2021-05-26 ASSESSMENT — MIFFLIN-ST. JEOR: SCORE: 2125.85

## 2021-05-26 ASSESSMENT — PAIN SCALES - GENERAL: PAINLEVEL: MILD PAIN (2)

## 2021-05-26 NOTE — LETTER
5/26/2021         RE: Jailene Breen  [address] [facility] [address]        Dear Colleague,    Thank you for referring your patient, Jailene Breen, to the [facility] GASTRO PROCEDURE [place]. Please see a copy of my visit note below.    Non-Invasive GI Procedure Visit    Jailene Breen is a 55 year old male with history of Hiatal hernia with GERD and esophagitis.   Patient stated reason for procedure: GERD and Pre-surgical Evaluation  COVID-19 Test Performed within 48-72hrs of the procedure:  Yes. COVID-19 result was NEGATIVE.    COVID-19 PCR Results    COVID-19 PCR Results 9/10/20 10/6/20 1/14/21 1/16/21 1/16/21 1/28/21 1/28/21 3/16/21 3/21/21 5/23/21 5/23/21       0903 0903 1012 1012   1054 1054   COVID-19 Virus PCR to [facility] - Result Not Detected  Not Detected Test received-See reflex to IDDL test SARS CoV2 (COVID-19) Virus RT-PCR  Test received-See reflex to IDDL test SARS CoV2 (COVID-19) Virus RT-PCR    Test received-See reflex to IDDL test SARS CoV2 (COVID-19) Virus RT-PCR    COVID-19 Virus PCR to [facility] - Source Nasopharyngeal  Nasopharyngeal Nasopharyngeal  Nasopharyngeal    Nasopharyngeal    COVID-19 Virus by PCR (External Result)  Undetected            SARS-CoV-2 Virus Specimen Source     Nasopharyngeal  Nasopharyngeal Nasopharyngeal   Nasopharyngeal   Flu A/B & SARS-COV-2 PCR Source         Nasopharyngeal     SARS-CoV-2 PCR Result     NEGATIVE  NEGATIVE NEGATIVE NEGATIVE  NEGATIVE      Comments are available for some flowsheets but are not being displayed.         COVID-19 Antibody Results, Testing for Immunity    COVID-19 Antibody Results, Testing for Immunity   No data to display.             Pre-Procedure Assessment  Patient presents to clinic today for Esophageal Manometry Study    Referring Provider: Dr Mack  Patient has undergone previous endoscopy.    Does patient report taking a PPI (omeprazole, pantoprazole, rabeprazole, lansoprazole, esomeprazole,  dexlansoprazole)? Yes. Is patient taking a PPI twice daily? Yes  Does patient report taking a H2 blocker (ranitidine, or famotidine)? No  Does patient report taking opioids? No  Patient reported that last food and/or drink was last consumed 8   hours ago.  Esophageal Questionnaire(s) Completed:   Yes - Esophageal Questionnaire(s)    BEDQ Questionnaire  BEDQ Questionnaire: How Often Have You Had the Following? 5/26/2021   Trouble eating solid food (meat, bread, vegetables) 1   Trouble eating soft foods (yogurt, jello, pudding) 0   Trouble swallowing liquids 1   Pain while swallowing 0   Coughing or choking while swallowing foods or liquids 0   Total Score: 2     BEDQ Questionnaire: Discomfort/Pain Ratings 5/26/2021   Eating solid food (meat, bread, vegetables) 0   Eating soft foods (yogurt, jello, pudding) 0   Drinking liquid 0   Total Score: 0       Eckardt Questionnaire  Eckardt Questionnaire 5/26/2021   Dysphagia 1   Regurgitation 0   Retrosternal Pain 0   Weight Loss (kg) 0   Total Score:  1       Promis 10 Questionnaire  PROMIS 10 FLOWSHEET DATA 5/26/2021   In general, would you say your health is: 2   In general, would you say your quality of life is: 2   In general, how would you rate your physical health? 1   In general, how would you rate your mental health, including your mood and your ability to think? 1   In general, how would you rate your satisfaction with your social activities and relationships? 1   In general, please rate how well you carry out your usual social activities and roles. (This includes activities at home, at work and in your community, and responsibilities as a parent, child, spouse, employee, friend, etc.) 1   To what extent are you able to carry out your everyday physical activities such as walking, climbing stairs, carrying groceries, or moving a chair? 2   In the past 7 days, how often have you been bothered by emotional problems such as feeling anxious, depressed, or irritable? 4  "  In the past 7 days, how would you rate your fatigue on average? 4   In the past 7 days, how would you rate your pain on average, where 0 means no pain, and 10 means worst imaginable pain? 9   Mental health question re-calculation - no clinical value 2   Physical health question re-calculation - no clinical value 2   Pain question re-calculation - no clinical value 2   Global Mental Health Score 6   Global Physical Health Score 7   PROMIS TOTAL - SUBSCORES 13       .    Patient Hx  Patient's history, medications and allergies were reviewed.     Height: 6' 4\"   Weight: 260 lbs 0 oz    Patient Active Problem List    Diagnosis Date Noted     Major depressive disorder, recurrent episode, severe with mixed features (H) 04/01/2021     Priority: Medium     Suicide ideation 03/21/2021     Priority: Medium     Depression, unspecified depression type 03/21/2021     Priority: Medium     Insomnia due to other mental disorder 03/19/2021     Priority: Medium     Suicidal ideation 03/16/2021     Priority: Medium     Immunocompromised (H) 01/13/2021     Priority: Medium     Moderate major depression (H) 10/06/2020     Priority: Medium     Tobacco use disorder 06/19/2017     Priority: Medium     Ankylosing spondylitis (H) 03/01/2017     Priority: Medium     Syncope, unspecified syncope type 02/27/2017     Priority: Medium     JEFF (generalized anxiety disorder) 10/31/2016     Priority: Medium     Panic attack 10/31/2016     Priority: Medium     Right-sided thoracic back pain, unspecified chronicity 10/10/2016     Priority: Medium     Tear meniscus knee, right, initial encounter 09/15/2016     Priority: Medium     Rheumatoid arthritis involving multiple sites, unspecified rheumatoid factor presence 09/15/2016     Priority: Medium     IMO Regulatory Load OCT 2020       Essential hypertension 11/21/2013     Priority: Medium     Back pain 11/17/2013     Priority: Medium     CARDIOVASCULAR SCREENING; LDL GOAL LESS THAN 160 03/06/2012     " Priority: Medium     Anxiety 03/06/2012     Priority: Medium     Overweight (BMI 25.0-29.9) 03/06/2012     Priority: Medium     Chronic pain 09/04/2011     Priority: Medium      Prior to Admission medications    Medication Sig Start Date End Date Taking? Authorizing Provider   acetaminophen (TYLENOL) 500 MG tablet Take 1,000 mg by mouth every 6 hours as needed for mild pain (headache)    Unknown, Entered By History   ARIPiprazole (ABILIFY) 5 MG tablet Take 1 tablet (5 mg) by mouth daily 4/9/21   Jw Adkins MD   atenolol (TENORMIN) 25 MG tablet Take 1 tablet (25 mg) by mouth daily 2/18/21   Aiden Resendez PA   buprenorphine HCl-naloxone HCl (SUBOXONE) 8-2 MG per film Use 0.5 film under the tongue 4 times per day for pain, approximately every 6 hours. Max 2 films per day. Fill 5/17/21 and begin 5/18/2021.  15 day supply 5/18/21   Susana Pike APRN CNP   chlorzoxazone (PARAFON FORTE) 500 MG tablet Take 1 tablet (500 mg) by mouth 3 times daily as needed for muscle spasms 2/16/21   Susana Pike APRN CNP   cholecalciferol (VITAMIN D3) 125 mcg (5000 units) capsule Take 125 mcg by mouth daily    Unknown, Entered By History   clonazePAM (KLONOPIN) 1 MG tablet TAKE ONE-HALF - 1 TABLET BY MOUTH ONCE DAILY AS NEEDED FOR ANXIETY 5/21/21   Aiden Resendez PA   diclofenac (VOLTAREN) 1 % topical gel Apply 2 g topically 4 times daily 3/19/21   Sury Thomas MD   etanercept (ENBREL SURECLICK) 50 MG/ML autoinjector Inject 50 mg Subcutaneous once a week 5/19/21   Raghu Parada MD   fish oil-omega-3 fatty acids 1000 MG capsule Take 1 g by mouth daily    Unknown, Entered By History   FLUoxetine (PROZAC) 40 MG capsule Take 1 capsule (40 mg) by mouth daily 4/23/21   Coffin, Richard Breyen, MD   folic acid (FOLVITE) 1 MG tablet Take 5 tablets (5 mg) by mouth daily 3/25/21   Saul Quesada MD   Lidocaine (LIDOCARE) 4 % Patch Place 1-3 patches onto the skin every 24 hours To prevent  "lidocaine toxicity, patient should be patch free for 12 hrs daily. 3/24/21   Saul Quesada MD   lidocaine (XYLOCAINE) 5 % external ointment Apply topically every 4 hours as needed for moderate pain 3/19/21   Sury Thomas MD   liothyronine (CYTOMEL) 25 MCG tablet Take 1 tablet (25 mcg) by mouth daily 3/25/21   Saul Quesada MD   medical cannabis (Patient's own supply) See Admin Instructions (The purpose of this order is to document that the patient reports taking medical cannabis.  This is not a prescription, and is not used to certify that the patient has a qualifying medical condition.)    Reported, Patient   menthol, Topical Analgesic, 2.5% (BENGAY VANISHIN SCENT) 2.5 % GEL topical gel Apply topically every 6 hours as needed for moderate pain 3/24/21   Saul Quesada MD   methotrexate sodium 2.5 MG TABS Take 4 tablets (10 mg) by mouth every 7 days  Patient taking differently: Take 4 tablets by mouth every 7 days Every tuesday 3/9/21   Raghu Parada MD   multivitamin (CENTRUM SILVER) tablet Take 1 tablet by mouth daily    Unknown, Entered By History   nicotine polacrilex (NICORETTE) 4 MG gum PLACE 1 PIECE INSIDE THE CHEEK AS NEEDED FOR SMOKING CESSATION 5/25/21   Aiden Resendez PA   omeprazole (PRILOSEC) 20 MG DR capsule Take 20 mg by mouth daily     Unknown, Entered By History   prazosin (MINIPRESS) 1 MG capsule Take 1 capsule (1 mg) by mouth At Bedtime 3/24/21   Saul Quesada MD   traZODone (DESYREL) 50 MG tablet Take 0.5 tablets (25 mg) by mouth At Bedtime . With additional 0.25 tablet (12.5mg) as needed daily for anxiety.  Patient taking differently: Take 50 mg by mouth At Bedtime  2/25/21   Coffin, Richard Breyen, MD     Allergies   Allergen Reactions     Mirtazapine      Other reaction(s): Coma     Hydrocodone Itching     Ms Contin [Morphine] Itching     Remeron Soltab Other (See Comments)     \"Blacked out\" for one week     Past Medical History:   Diagnosis Date     Ankylosing " spondylitis (H) 3/1/2017     Anxiety      Arthritis     back, knees     Back pain     possible drug seeking behavior in the past.      Hypertension      Panic attacks      Past Surgical History:   Procedure Laterality Date     ARTHROSCOPY KNEE Right 9/22/2016    Procedure: ARTHROSCOPY KNEE;  Surgeon: Fredo Hughes MD;  Location: [facility] OR     COMBINED ESOPHAGOSCOPY, GASTROSCOPY, DUODENOSCOPY (EGD) WITH CO2 INSUFFLATION N/A 1/19/2021    Procedure: ESOPHAGOGASTRODUODENOSCOPY, WITH CO2 INSUFFLATION;  Surgeon: Brandon Mack MD;  Location: [facility] OR     ESOPHAGOSCOPY, GASTROSCOPY, DUODENOSCOPY (EGD), COMBINED N/A 1/19/2021    Procedure: Esophagogastroduodenoscopy, With Biopsy;  Surgeon: Brandon Mack MD;  Location: [facility] OR     INJECT PARAVERTEBRAL FACET JOINT LUMBAR / SACRAL FIRST Right 11/25/2016    Procedure: INJECT PARAVERTEBRAL FACET JOINT LUMBAR / SACRAL FIRST;  Surgeon: Doretha Magallanes MD;  Location: [facility] OR     ORTHOPEDIC SURGERY      Rt knee X 2     ORTHOPEDIC SURGERY      Lt foot     Family History   Problem Relation Age of Onset     Autoimmune Disease No family hx of      Social History     Tobacco Use     Smoking status: Never Smoker     Smokeless tobacco: Former User     Types: Chew     Tobacco comment: Chew   Substance Use Topics     Alcohol use: No     Comment: none        Pre-Procedure Education & Consent  Procedure education was provided to: Patient  Teaching method: Explanation  Barriers to learning: No Barrier    Patient indicated understanding of pre-procedure instruction and appropriate consent was obtained and documented.    ____________________________________________________________________    Post-Procedure Documentation: Esophageal Manometry    Manometry catheter was placed via right nare to 55 cm and normal saline swallows given per protocol. Manometry catheter was removed at the end of test.    Discharge instructions given to patient.    Notification of pending test results  sent to provider for interpretation. Please reference scanned document for final interpretation of results. Patient will follow up with referring provider for test results.    Nancy Chapman RN on 5/26/2021 at 8:49 AM

## 2021-05-26 NOTE — PATIENT INSTRUCTIONS
Esophogeal Manometry Study  1. Resume regular diet.  2. You may have a bloody nose or sore throat after the procedure.  3. If you have questions call [phone] from 7:00am-5:00pm.  For afterhours questions call GI doctor on call at [phone].

## 2021-05-26 NOTE — PROGRESS NOTES
Non-Invasive GI Procedure Visit    Jailene Breen is a 55 year old male with history of Hiatal hernia with GERD and esophagitis.   Patient stated reason for procedure: GERD and Pre-surgical Evaluation  COVID-19 Test Performed within 48-72hrs of the procedure:  Yes. COVID-19 result was NEGATIVE.    COVID-19 PCR Results    COVID-19 PCR Results 9/10/20 10/6/20 1/14/21 1/16/21 1/16/21 1/28/21 1/28/21 3/16/21 3/21/21 5/23/21 5/23/21       0903 0903 1012 1012   1054 1054   COVID-19 Virus PCR to [facility] - Result Not Detected  Not Detected Test received-See reflex to IDDL test SARS CoV2 (COVID-19) Virus RT-PCR  Test received-See reflex to IDDL test SARS CoV2 (COVID-19) Virus RT-PCR    Test received-See reflex to IDDL test SARS CoV2 (COVID-19) Virus RT-PCR    COVID-19 Virus PCR to [facility] - Source Nasopharyngeal  Nasopharyngeal Nasopharyngeal  Nasopharyngeal    Nasopharyngeal    COVID-19 Virus by PCR (External Result)  Undetected            SARS-CoV-2 Virus Specimen Source     Nasopharyngeal  Nasopharyngeal Nasopharyngeal   Nasopharyngeal   Flu A/B & SARS-COV-2 PCR Source         Nasopharyngeal     SARS-CoV-2 PCR Result     NEGATIVE  NEGATIVE NEGATIVE NEGATIVE  NEGATIVE      Comments are available for some flowsheets but are not being displayed.         COVID-19 Antibody Results, Testing for Immunity    COVID-19 Antibody Results, Testing for Immunity   No data to display.             Pre-Procedure Assessment  Patient presents to clinic today for Esophageal Manometry Study    Referring Provider: Dr Mack  Patient has undergone previous endoscopy.    Does patient report taking a PPI (omeprazole, pantoprazole, rabeprazole, lansoprazole, esomeprazole, dexlansoprazole)? Yes. Is patient taking a PPI twice daily? Yes  Does patient report taking a H2 blocker (ranitidine, or famotidine)? No  Does patient report taking opioids? No  Patient reported that last food and/or drink was last consumed 8   hours ago.  Esophageal  Questionnaire(s) Completed:   Yes - Esophageal Questionnaire(s)    BEDQ Questionnaire  BEDQ Questionnaire: How Often Have You Had the Following? 5/26/2021   Trouble eating solid food (meat, bread, vegetables) 1   Trouble eating soft foods (yogurt, jello, pudding) 0   Trouble swallowing liquids 1   Pain while swallowing 0   Coughing or choking while swallowing foods or liquids 0   Total Score: 2     BEDQ Questionnaire: Discomfort/Pain Ratings 5/26/2021   Eating solid food (meat, bread, vegetables) 0   Eating soft foods (yogurt, jello, pudding) 0   Drinking liquid 0   Total Score: 0       Eckardt Questionnaire  Eckardt Questionnaire 5/26/2021   Dysphagia 1   Regurgitation 0   Retrosternal Pain 0   Weight Loss (kg) 0   Total Score:  1       Promis 10 Questionnaire  PROMIS 10 FLOWSHEET DATA 5/26/2021   In general, would you say your health is: 2   In general, would you say your quality of life is: 2   In general, how would you rate your physical health? 1   In general, how would you rate your mental health, including your mood and your ability to think? 1   In general, how would you rate your satisfaction with your social activities and relationships? 1   In general, please rate how well you carry out your usual social activities and roles. (This includes activities at home, at work and in your community, and responsibilities as a parent, child, spouse, employee, friend, etc.) 1   To what extent are you able to carry out your everyday physical activities such as walking, climbing stairs, carrying groceries, or moving a chair? 2   In the past 7 days, how often have you been bothered by emotional problems such as feeling anxious, depressed, or irritable? 4   In the past 7 days, how would you rate your fatigue on average? 4   In the past 7 days, how would you rate your pain on average, where 0 means no pain, and 10 means worst imaginable pain? 9   Mental health question re-calculation - no clinical value 2   Physical  "health question re-calculation - no clinical value 2   Pain question re-calculation - no clinical value 2   Global Mental Health Score 6   Global Physical Health Score 7   PROMIS TOTAL - SUBSCORES 13       .    Patient Hx  Patient's history, medications and allergies were reviewed.     Height: 6' 4\"   Weight: 260 lbs 0 oz    Patient Active Problem List    Diagnosis Date Noted     Major depressive disorder, recurrent episode, severe with mixed features (H) 04/01/2021     Priority: Medium     Suicide ideation 03/21/2021     Priority: Medium     Depression, unspecified depression type 03/21/2021     Priority: Medium     Insomnia due to other mental disorder 03/19/2021     Priority: Medium     Suicidal ideation 03/16/2021     Priority: Medium     Immunocompromised (H) 01/13/2021     Priority: Medium     Moderate major depression (H) 10/06/2020     Priority: Medium     Tobacco use disorder 06/19/2017     Priority: Medium     Ankylosing spondylitis (H) 03/01/2017     Priority: Medium     Syncope, unspecified syncope type 02/27/2017     Priority: Medium     JEFF (generalized anxiety disorder) 10/31/2016     Priority: Medium     Panic attack 10/31/2016     Priority: Medium     Right-sided thoracic back pain, unspecified chronicity 10/10/2016     Priority: Medium     Tear meniscus knee, right, initial encounter 09/15/2016     Priority: Medium     Rheumatoid arthritis involving multiple sites, unspecified rheumatoid factor presence 09/15/2016     Priority: Medium     IMO Regulatory Load OCT 2020       Essential hypertension 11/21/2013     Priority: Medium     Back pain 11/17/2013     Priority: Medium     CARDIOVASCULAR SCREENING; LDL GOAL LESS THAN 160 03/06/2012     Priority: Medium     Anxiety 03/06/2012     Priority: Medium     Overweight (BMI 25.0-29.9) 03/06/2012     Priority: Medium     Chronic pain 09/04/2011     Priority: Medium      Prior to Admission medications    Medication Sig Start Date End Date Taking? " Authorizing Provider   acetaminophen (TYLENOL) 500 MG tablet Take 1,000 mg by mouth every 6 hours as needed for mild pain (headache)    Unknown, Entered By History   ARIPiprazole (ABILIFY) 5 MG tablet Take 1 tablet (5 mg) by mouth daily 4/9/21   Jw Adkins MD   atenolol (TENORMIN) 25 MG tablet Take 1 tablet (25 mg) by mouth daily 2/18/21   Aiden Resendez PA   buprenorphine HCl-naloxone HCl (SUBOXONE) 8-2 MG per film Use 0.5 film under the tongue 4 times per day for pain, approximately every 6 hours. Max 2 films per day. Fill 5/17/21 and begin 5/18/2021.  15 day supply 5/18/21   Susana Pike APRN CNP   chlorzoxazone (PARAFON FORTE) 500 MG tablet Take 1 tablet (500 mg) by mouth 3 times daily as needed for muscle spasms 2/16/21   Susana Pike APRN CNP   cholecalciferol (VITAMIN D3) 125 mcg (5000 units) capsule Take 125 mcg by mouth daily    Unknown, Entered By History   clonazePAM (KLONOPIN) 1 MG tablet TAKE ONE-HALF - 1 TABLET BY MOUTH ONCE DAILY AS NEEDED FOR ANXIETY 5/21/21   Aiden Resendez PA   diclofenac (VOLTAREN) 1 % topical gel Apply 2 g topically 4 times daily 3/19/21   Sury Thomas MD   etanercept (ENBREL SURECLICK) 50 MG/ML autoinjector Inject 50 mg Subcutaneous once a week 5/19/21   Raghu Parada MD   fish oil-omega-3 fatty acids 1000 MG capsule Take 1 g by mouth daily    Unknown, Entered By History   FLUoxetine (PROZAC) 40 MG capsule Take 1 capsule (40 mg) by mouth daily 4/23/21   Coffin, Richard Breyen, MD   folic acid (FOLVITE) 1 MG tablet Take 5 tablets (5 mg) by mouth daily 3/25/21   Saul Quesada MD   Lidocaine (LIDOCARE) 4 % Patch Place 1-3 patches onto the skin every 24 hours To prevent lidocaine toxicity, patient should be patch free for 12 hrs daily. 3/24/21   Saul Quesada MD   lidocaine (XYLOCAINE) 5 % external ointment Apply topically every 4 hours as needed for moderate pain 3/19/21   Sury Thomas MD   liothyronine (CYTOMEL) 25  "MCG tablet Take 1 tablet (25 mcg) by mouth daily 3/25/21   Saul Quesada MD   medical cannabis (Patient's own supply) See Admin Instructions (The purpose of this order is to document that the patient reports taking medical cannabis.  This is not a prescription, and is not used to certify that the patient has a qualifying medical condition.)    Reported, Patient   menthol, Topical Analgesic, 2.5% (BENGAY VANISHIN SCENT) 2.5 % GEL topical gel Apply topically every 6 hours as needed for moderate pain 3/24/21   Saul Quesada MD   methotrexate sodium 2.5 MG TABS Take 4 tablets (10 mg) by mouth every 7 days  Patient taking differently: Take 4 tablets by mouth every 7 days Every tuesday 3/9/21   Raghu Parada MD   multivitamin (CENTRUM SILVER) tablet Take 1 tablet by mouth daily    Unknown, Entered By History   nicotine polacrilex (NICORETTE) 4 MG gum PLACE 1 PIECE INSIDE THE CHEEK AS NEEDED FOR SMOKING CESSATION 5/25/21   Aiden Resendez PA   omeprazole (PRILOSEC) 20 MG DR capsule Take 20 mg by mouth daily     Unknown, Entered By History   prazosin (MINIPRESS) 1 MG capsule Take 1 capsule (1 mg) by mouth At Bedtime 3/24/21   Saul Quesada MD   traZODone (DESYREL) 50 MG tablet Take 0.5 tablets (25 mg) by mouth At Bedtime . With additional 0.25 tablet (12.5mg) as needed daily for anxiety.  Patient taking differently: Take 50 mg by mouth At Bedtime  2/25/21   Coffin, Richard Breyen, MD     Allergies   Allergen Reactions     Mirtazapine      Other reaction(s): Coma     Hydrocodone Itching     Ms Contin [Morphine] Itching     Remeron Soltab Other (See Comments)     \"Blacked out\" for one week     Past Medical History:   Diagnosis Date     Ankylosing spondylitis (H) 3/1/2017     Anxiety      Arthritis     back, knees     Back pain     possible drug seeking behavior in the past.      Hypertension      Panic attacks      Past Surgical History:   Procedure Laterality Date     ARTHROSCOPY KNEE Right 9/22/2016    " Procedure: ARTHROSCOPY KNEE;  Surgeon: Fredo Hughes MD;  Location: [facility] OR     COMBINED ESOPHAGOSCOPY, GASTROSCOPY, DUODENOSCOPY (EGD) WITH CO2 INSUFFLATION N/A 1/19/2021    Procedure: ESOPHAGOGASTRODUODENOSCOPY, WITH CO2 INSUFFLATION;  Surgeon: Brandon Mack MD;  Location: [facility] OR     ESOPHAGOSCOPY, GASTROSCOPY, DUODENOSCOPY (EGD), COMBINED N/A 1/19/2021    Procedure: Esophagogastroduodenoscopy, With Biopsy;  Surgeon: Brandon Mack MD;  Location: [facility] OR     INJECT PARAVERTEBRAL FACET JOINT LUMBAR / SACRAL FIRST Right 11/25/2016    Procedure: INJECT PARAVERTEBRAL FACET JOINT LUMBAR / SACRAL FIRST;  Surgeon: Doretha Magallanes MD;  Location: [facility] OR     ORTHOPEDIC SURGERY      Rt knee X 2     ORTHOPEDIC SURGERY      Lt foot     Family History   Problem Relation Age of Onset     Autoimmune Disease No family hx of      Social History     Tobacco Use     Smoking status: Never Smoker     Smokeless tobacco: Former User     Types: Chew     Tobacco comment: Chew   Substance Use Topics     Alcohol use: No     Comment: none        Pre-Procedure Education & Consent  Procedure education was provided to: Patient  Teaching method: Explanation  Barriers to learning: No Barrier    Patient indicated understanding of pre-procedure instruction and appropriate consent was obtained and documented.    ____________________________________________________________________    Post-Procedure Documentation: Esophageal Manometry    Manometry catheter was placed via right nare to 55 cm and normal saline swallows given per protocol. Manometry catheter was removed at the end of test.    Discharge instructions given to patient.    Notification of pending test results sent to provider for interpretation. Please reference scanned document for final interpretation of results. Patient will follow up with referring provider for test results.    Nancy Chapman RN on 5/26/2021 at 8:49 AM

## 2021-05-27 ENCOUNTER — HOSPITAL ENCOUNTER (OUTPATIENT)
Facility: CLINIC | Age: 56
Discharge: HOME | End: 2021-05-27
Attending: INTERNAL MEDICINE | Admitting: INTERNAL MEDICINE
Payer: COMMERCIAL

## 2021-05-27 ENCOUNTER — TELEPHONE (OUTPATIENT)
Dept: BEHAVIORAL HEALTH | Facility: CLINIC | Age: 56
End: 2021-05-27

## 2021-05-27 ENCOUNTER — TELEPHONE (OUTPATIENT)
Dept: GASTROENTEROLOGY | Facility: OUTPATIENT CENTER | Age: 56
End: 2021-05-27

## 2021-05-27 VITALS
HEART RATE: 51 BPM | TEMPERATURE: 98.4 F | RESPIRATION RATE: 16 BRPM | OXYGEN SATURATION: 98 % | DIASTOLIC BLOOD PRESSURE: 72 MMHG | SYSTOLIC BLOOD PRESSURE: 94 MMHG

## 2021-05-27 DIAGNOSIS — K44.9 HIATAL HERNIA: ICD-10-CM

## 2021-05-27 DIAGNOSIS — K20.80 ESOPHAGITIS DISSECANS SUPERFICIALIS: ICD-10-CM

## 2021-05-27 DIAGNOSIS — Z11.59 ENCOUNTER FOR SCREENING FOR OTHER VIRAL DISEASES: ICD-10-CM

## 2021-05-27 DIAGNOSIS — K21.00 GASTROESOPHAGEAL REFLUX DISEASE WITH ESOPHAGITIS WITHOUT HEMORRHAGE: Primary | ICD-10-CM

## 2021-05-27 LAB — UPPER GI ENDOSCOPY: NORMAL

## 2021-05-27 PROCEDURE — G0500 MOD SEDAT ENDO SERVICE >5YRS: HCPCS | Performed by: INTERNAL MEDICINE

## 2021-05-27 PROCEDURE — 43235 EGD DIAGNOSTIC BRUSH WASH: CPT | Performed by: INTERNAL MEDICINE

## 2021-05-27 PROCEDURE — 91035 G-ESOPH REFLX TST W/ELECTROD: CPT | Performed by: INTERNAL MEDICINE

## 2021-05-27 PROCEDURE — 250N000009 HC RX 250: Performed by: INTERNAL MEDICINE

## 2021-05-27 PROCEDURE — 99153 MOD SED SAME PHYS/QHP EA: CPT | Performed by: INTERNAL MEDICINE

## 2021-05-27 PROCEDURE — 250N000011 HC RX IP 250 OP 636: Performed by: INTERNAL MEDICINE

## 2021-05-27 RX ORDER — ONDANSETRON 4 MG/1
4 TABLET, ORALLY DISINTEGRATING ORAL EVERY 6 HOURS PRN
Status: CANCELLED | OUTPATIENT
Start: 2021-05-27

## 2021-05-27 RX ORDER — NALOXONE HYDROCHLORIDE 0.4 MG/ML
0.4 INJECTION, SOLUTION INTRAMUSCULAR; INTRAVENOUS; SUBCUTANEOUS
Status: CANCELLED | OUTPATIENT
Start: 2021-05-27

## 2021-05-27 RX ORDER — ONDANSETRON 2 MG/ML
4 INJECTION INTRAMUSCULAR; INTRAVENOUS EVERY 6 HOURS PRN
Status: CANCELLED | OUTPATIENT
Start: 2021-05-27

## 2021-05-27 RX ORDER — NALOXONE HYDROCHLORIDE 0.4 MG/ML
0.2 INJECTION, SOLUTION INTRAMUSCULAR; INTRAVENOUS; SUBCUTANEOUS
Status: CANCELLED | OUTPATIENT
Start: 2021-05-27

## 2021-05-27 RX ORDER — FLUMAZENIL 0.1 MG/ML
0.2 INJECTION, SOLUTION INTRAVENOUS
Status: CANCELLED | OUTPATIENT
Start: 2021-05-27 | End: 2021-05-27

## 2021-05-27 RX ORDER — FENTANYL CITRATE 50 UG/ML
INJECTION, SOLUTION INTRAMUSCULAR; INTRAVENOUS PRN
Status: COMPLETED | OUTPATIENT
Start: 2021-05-27 | End: 2021-05-27

## 2021-05-27 RX ORDER — PROCHLORPERAZINE MALEATE 10 MG
10 TABLET ORAL EVERY 6 HOURS PRN
Status: CANCELLED | OUTPATIENT
Start: 2021-05-27

## 2021-05-27 RX ORDER — LIDOCAINE 40 MG/G
CREAM TOPICAL
Status: DISCONTINUED | OUTPATIENT
Start: 2021-05-27 | End: 2021-05-27 | Stop reason: HOSPADM

## 2021-05-27 RX ORDER — DIPHENHYDRAMINE HYDROCHLORIDE 50 MG/ML
INJECTION INTRAMUSCULAR; INTRAVENOUS PRN
Status: COMPLETED | OUTPATIENT
Start: 2021-05-27 | End: 2021-05-27

## 2021-05-27 RX ORDER — ONDANSETRON 2 MG/ML
4 INJECTION INTRAMUSCULAR; INTRAVENOUS
Status: DISCONTINUED | OUTPATIENT
Start: 2021-05-27 | End: 2021-05-27 | Stop reason: HOSPADM

## 2021-05-27 RX ADMIN — TOPICAL ANESTHETIC 1 SPRAY: 200 SPRAY DENTAL; PERIODONTAL at 08:43

## 2021-05-27 RX ADMIN — DIPHENHYDRAMINE HYDROCHLORIDE 25 MG: 50 INJECTION, SOLUTION INTRAMUSCULAR; INTRAVENOUS at 08:48

## 2021-05-27 RX ADMIN — MIDAZOLAM 1 MG: 1 INJECTION INTRAMUSCULAR; INTRAVENOUS at 09:09

## 2021-05-27 RX ADMIN — FENTANYL CITRATE 25 MCG: 50 INJECTION, SOLUTION INTRAMUSCULAR; INTRAVENOUS at 08:57

## 2021-05-27 RX ADMIN — FENTANYL CITRATE 25 MCG: 50 INJECTION, SOLUTION INTRAMUSCULAR; INTRAVENOUS at 08:51

## 2021-05-27 RX ADMIN — MIDAZOLAM 1 MG: 1 INJECTION INTRAMUSCULAR; INTRAVENOUS at 08:55

## 2021-05-27 RX ADMIN — MIDAZOLAM 1 MG: 1 INJECTION INTRAMUSCULAR; INTRAVENOUS at 09:06

## 2021-05-27 RX ADMIN — DIPHENHYDRAMINE HYDROCHLORIDE 25 MG: 50 INJECTION, SOLUTION INTRAMUSCULAR; INTRAVENOUS at 08:55

## 2021-05-27 RX ADMIN — FENTANYL CITRATE 25 MCG: 50 INJECTION, SOLUTION INTRAMUSCULAR; INTRAVENOUS at 08:55

## 2021-05-27 RX ADMIN — FENTANYL CITRATE 25 MCG: 50 INJECTION, SOLUTION INTRAMUSCULAR; INTRAVENOUS at 09:08

## 2021-05-27 RX ADMIN — FENTANYL CITRATE 25 MCG: 50 INJECTION, SOLUTION INTRAMUSCULAR; INTRAVENOUS at 09:06

## 2021-05-27 RX ADMIN — FENTANYL CITRATE 25 MCG: 50 INJECTION, SOLUTION INTRAMUSCULAR; INTRAVENOUS at 09:00

## 2021-05-27 RX ADMIN — FENTANYL CITRATE 50 MCG: 50 INJECTION, SOLUTION INTRAMUSCULAR; INTRAVENOUS at 08:49

## 2021-05-27 RX ADMIN — MIDAZOLAM 1 MG: 1 INJECTION INTRAMUSCULAR; INTRAVENOUS at 08:51

## 2021-05-27 RX ADMIN — MIDAZOLAM 1 MG: 1 INJECTION INTRAMUSCULAR; INTRAVENOUS at 09:00

## 2021-05-27 RX ADMIN — MIDAZOLAM 1 MG: 1 INJECTION INTRAMUSCULAR; INTRAVENOUS at 08:49

## 2021-05-27 RX ADMIN — MIDAZOLAM 2 MG: 1 INJECTION INTRAMUSCULAR; INTRAVENOUS at 08:44

## 2021-05-27 RX ADMIN — FENTANYL CITRATE 100 MCG: 50 INJECTION, SOLUTION INTRAMUSCULAR; INTRAVENOUS at 08:44

## 2021-05-27 NOTE — TELEPHONE ENCOUNTER
Pt had failed egd w/ bravo under [facility] on 5/27. Pt is rescheduling under [facility] this time around.     Scheduling Details    Procedure Scheduled: EGD W/ BRAVO  Provider/Surgeon: MAGO  Date of Procedure: 6/3/2021  Location: [facility]  Caller (Please ask for phone number if not scheduled by patient): PATIENT      Sedation Type: [facility]  Conscious Sedation- Needs  for 6 hours after the procedure  MAC/General-Needs  for 24 hours after procedure    Pre-op Required at [facility] and OR for  MAC sedation: Y-SCHED FOR 6/2 @345PM VIDEO VISIT  (if yes advise patient they will need a pre-op prior to procedure)      Is patient on blood thinners? -N (If yes- inform patient to follow up with PCP or provider for follow up instructions)     Informed patient they will need an adult  Y  Cannot take any type of public or medical transportation alone    Informed Patient of COVID Test Requirement Y-SCHED 6/2 @730AM  [facility]    Confirmed Nurse will call to complete assessment     Additional comments:

## 2021-05-27 NOTE — OR NURSING
Pt unable to tolerate procedure after a significant amount of medication. Pt would wake up when scope was introduced. Spent 30 minutes sedating pt .

## 2021-05-27 NOTE — TELEPHONE ENCOUNTER
Phone call to Jamison to give him the main phone # and ask him to send a message by DoctorC or leave a message and not cancel future appointments, since it looks like he discharged.    Elpidio Blake, TIRSO,  Licensed Clinical Psychologist

## 2021-05-27 NOTE — PROGRESS NOTES
"VIDEO VISIT  Jailene Breen is a 55 year old patient who is being evaluated via a billable video visit.      The patient has been notified of following:   \"We have found that certain health care needs can be provided without the need for an in-person physical exam. This service lets us provide the care you need with a video conversation. If a prescription is necessary we can send it directly to your pharmacy. If lab work is needed we can place an order for that and you can then stop by our lab to have the test done at a later time. Insurers are generally covering virtual visits as they would in-office visits so billing should not be different than normal.  If for some reason you do get billed incorrectly, you should contact the billing office to correct it and that number is in the AVS .    Patient has given verbal consent for video visit?: Yes   How would you like to obtain your AVS?: TheTakes  AVS SmartPhrase [PsychAVS] has been placed in 'Patient Instructions': Yes      Video- Visit Details  Type of service:  video visit for medication management  Time of service:    Date:  05/28/2021    Video Start Time:  8:46 AM        Video End Time:  9:25 AM    Reason for video visit:  Patient unable to travel due to Covid-19  Originating Site (patient location):  [facility]   Location- Patient's home  Distant Site (provider location):  Remote location  Mode of Communication:  Video Conference via AmWell  Consent:  Patient has given verbal consent for video visit?: Yes        [facility]  Psychiatry Clinic  PSYCHIATRIC PROGRESS NOTE     CARE TEAM:    PCP- Aiden Resendez   Rheumatology- Raghu Parada MD  [facility]- Susana Pike APRN; Santa Farrell PsyD    Jailene Breen is a 55 year old patient who prefers the name Les and uses pronouns he, him, his.     PERTINENT BACKGROUND        [most recent eval 09/21/20]     Psych critical item history includes " "suicidal ideation, SIB [cutting], mutiple psychotropic trials , severe med reaction, psych hosp (<3), SUBSTANCE USE: alcohol, substance use treatment  and Major Medical Problems (Rheumatoid Arthritis and Ankylosing Spondylitis)    INTERIM HISTORY        The patient reports good treatment adherence.  History was provided by the patient who was a good historian.     Since the last visit:   - COVID vaccine completed about a month ago.  - Increased Abilify to 5mg daily  - Patient called to report increased depression and \"hopelessness\" since last appointment.  - Today, is \"tired\". Supposed to have procedure for hiatal hernia but unable to complete procedure due to too much gaging. Needs to return to complete procedure next week.  - During IOP had a substitute who \"cut me off\" \"right when I was getting into the meat of it.\" Hasn't gone to IOP this week because of migraines and his procedure.  - After today, \"will probably feel worse\" because I'll have talked about \"something touchy\" and \"then it almost pops something open.\"  - \"Just so hard not working\" because \"I feel like a bum\"  - Memory has been \"shot lately\", over the last month. \"Just feel kind of foggy\", like \"I don't have a clear head.\"  - \"It's just hard with all of the depression and pain.\" Does \"try to do little things, but no one will notice.\"  - Marijuana \"seems to help a little with depression but not much for pain.\" On \"extra strength\", takes 1 tablet every 8-12 hours. If he takes more \"I will get way to high\" and \"I don't like that feeling.\"  - Will get \"anxious every once in a while.\" Will take Klonopin \"every 2-3 days.\" Because hydroxyzine doesn't seem to \"work too well.\"  - Right now, \"the way I feel a little anxious and high strung and I feel a little foggy and I don't like it.\"  - No suicidal ideation lately, last time was 1 week ago.  - Reviewed crisis action plan. Initially, did not have action plan, but \"when I'm feeling like that I just want to " "be taken care of.\" Doesn't want to \"cause my wife any more stress or worry.\" Does still talk to  and biologic father. Encouraged to talk in group, with wife (earlier about symptoms), and message clinic if needing increased support. Also reach out to start individual therapy.    RECENT PSYCH ROS:   Depression: low energy, poor concentration /memory and feeling worthless and other symptoms as above  Elevated:  none  Psychosis:  none  Anxiety:  as above  Trauma Related:  none  Dysregulation:  none  Eating Disorder: no  Other: no    Adverse Effects:  denies  Pertinent Negative Symptoms: No suicidal ideation, violent ideation, aggression, psychosis, hallucinations, delusions, otto and hypomania    RECENT SUBSTANCE USE:     Alcohol- none since , used to attend AA meetings prior to COVID.  Tobacco- denies  Caffeine- yes  Cannabis- denies     Opioids- as prescribed           Narcan Kit- prescribed   Other illicit drugs- none    FAMILY and SOCIAL HISTORY   [1ea, 1ea]           [per patient report]            FAMILY HX:  Mom - depression (since  when sister )    SOCIAL HX:  Financial/ Work- currently attending college at [employer], is hoping to start school at [employer] for a degree in psychology after being unable to work due to diagnosis of rheumatoid arthritis and ankylosing spondylitis  Partner/ -  in .  Children- 23 and 25 year old kids      Living situation- [place], house with wife and 2 kids      Social/ Spiritual Support- Talks to  every two weeks for lunch, but \"doesn't really have friends\". Family is supportive.      PSYCH and SUBSTANCE USE Critical Summary Points since  - started escitalopram  November - increased escitalopram 20mg daily  February - stopped hydroxyzine; started trazodone 50mg at bedtime and 25mg daily as needed for anxiety   - Patient hospitalized started on Abilify, titrated to 5mg daily; escitalopram switched to " fluoxetine 40mg daily; started prazosin 1mg at bedtime. Pain management switched oxycodone to Suboxone and started medical marijuana.  May - patient still taking hydroxyzine, discontinued today.    MEDICAL HISTORY  and ALLERGY     ALLERGIES: Mirtazapine, Hydrocodone, Ms contin [morphine], and Remeron soltab     Patient Active Problem List   Diagnosis     CARDIOVASCULAR SCREENING; LDL GOAL LESS THAN 160     Anxiety     Overweight (BMI 25.0-29.9)     Back pain     Tear meniscus knee, right, initial encounter     Rheumatoid arthritis involving multiple sites, unspecified rheumatoid factor presence     Chronic pain     Right-sided thoracic back pain, unspecified chronicity     JEFF (generalized anxiety disorder)     Panic attack     Syncope, unspecified syncope type     Ankylosing spondylitis (H)     Tobacco use disorder     Moderate major depression (H)     Immunocompromised (H)     Essential hypertension     Suicidal ideation     Insomnia due to other mental disorder     Suicide ideation     Depression, unspecified depression type     Major depressive disorder, recurrent episode, severe with mixed features (H)         MEDICAL REVIEW OF SYSTEMS     Contraception- none    A comprehensive review of systems was performed and is negative other than noted in the HPI.    MEDICATIONS          *Trazodone initiated 2/25/2021.*    Current Outpatient Medications   Medication Sig Dispense Refill     acetaminophen (TYLENOL) 500 MG tablet Take 1,000 mg by mouth every 6 hours as needed for mild pain (headache)       ARIPiprazole (ABILIFY) 5 MG tablet Take 1 tablet (5 mg) by mouth daily 30 tablet 0     atenolol (TENORMIN) 25 MG tablet Take 1 tablet (25 mg) by mouth daily 90 tablet 1     buprenorphine HCl-naloxone HCl (SUBOXONE) 8-2 MG per film Use 0.5 film under the tongue 4 times per day for pain, approximately every 6 hours. Max 2 films per day. Fill 5/17/21 and begin 5/18/2021.  15 day supply 30 each 0     chlorzoxazone (PARAFON  FORTE) 500 MG tablet Take 1 tablet (500 mg) by mouth 3 times daily as needed for muscle spasms 45 tablet 1     cholecalciferol (VITAMIN D3) 125 mcg (5000 units) capsule Take 125 mcg by mouth daily       clonazePAM (KLONOPIN) 1 MG tablet TAKE ONE-HALF - 1 TABLET BY MOUTH ONCE DAILY AS NEEDED FOR ANXIETY 30 tablet 0     diclofenac (VOLTAREN) 1 % topical gel Apply 2 g topically 4 times daily 50 g 0     etanercept (ENBREL SURECLICK) 50 MG/ML autoinjector Inject 50 mg Subcutaneous once a week 1 mL 3     fish oil-omega-3 fatty acids 1000 MG capsule Take 1 g by mouth daily       FLUoxetine (PROZAC) 40 MG capsule Take 1 capsule (40 mg) by mouth daily 30 capsule 0     folic acid (FOLVITE) 1 MG tablet Take 5 tablets (5 mg) by mouth daily 150 tablet 3     Lidocaine (LIDOCARE) 4 % Patch Place 1-3 patches onto the skin every 24 hours To prevent lidocaine toxicity, patient should be patch free for 12 hrs daily. 90 patch 3     lidocaine (XYLOCAINE) 5 % external ointment Apply topically every 4 hours as needed for moderate pain 50 g 0     liothyronine (CYTOMEL) 25 MCG tablet Take 1 tablet (25 mcg) by mouth daily 30 tablet 3     medical cannabis (Patient's own supply) See Admin Instructions (The purpose of this order is to document that the patient reports taking medical cannabis.  This is not a prescription, and is not used to certify that the patient has a qualifying medical condition.)       menthol, Topical Analgesic, 2.5% (BENGAY VANISHIN SCENT) 2.5 % GEL topical gel Apply topically every 6 hours as needed for moderate pain 45 g 3     methotrexate sodium 2.5 MG TABS Take 4 tablets (10 mg) by mouth every 7 days (Patient taking differently: Take 4 tablets by mouth every 7 days Every tuesday) 48 tablet 0     multivitamin (CENTRUM SILVER) tablet Take 1 tablet by mouth daily       nicotine polacrilex (NICORETTE) 4 MG gum PLACE 1 PIECE INSIDE THE CHEEK AS NEEDED FOR SMOKING CESSATION 200 each 3     omeprazole (PRILOSEC) 20 MG   capsule Take 20 mg by mouth daily        prazosin (MINIPRESS) 1 MG capsule Take 1 capsule (1 mg) by mouth At Bedtime 30 capsule 3     traZODone (DESYREL) 50 MG tablet Take 0.5 tablets (25 mg) by mouth At Bedtime . With additional 0.25 tablet (12.5mg) as needed daily for anxiety. (Patient taking differently: Take 50 mg by mouth At Bedtime ) 30 tablet 1     VITALS                     There were no vitals taken for this visit.   MENTAL STATUS EXAM     [9, 14 cog gs]     Alertness: alert  and oriented  Appearance: adequately groomed  Behavior/Demeanor: cooperative, with good  eye contact   Speech: regular rate and rhythm  Language: intact  Psychomotor: normal or unremarkable  Mood: depressed   Affect: appropriate; congruent to: mood- yes, content- yes  Thought Process/Associations: unremarkable  Thought Content:  Reports none;  Denies suicidal ideation, violent ideation and delusions   Perception:  Reports none;  Denies auditory hallucinations and visual hallucinations  Insight: good  Judgment: good  Cognition: (6) oriented: time, person, and place  attention span: intact  concentration: intact  recent memory: intact  remote memory: intact  fund of knowledge: appropriate  Gait and Station: N/A (telehealth)    LABS and DATA     PHQ9 TODAY = not completed due to telehealth  PHQ 12/15/2020 3/18/2021 3/30/2021   PHQ-9 Total Score 15 20 16   Q9: Thoughts of better off dead/self-harm past 2 weeks Not at all More than half the days Several days   F/U: Thoughts of suicide or self-harm - - Yes   F/U: Self harm-plan - - No   F/U: Self-harm action - - No   F/U: Safety concerns - - No       Recent Labs   Lab Test 03/21/21  0808 03/17/21  0748 09/10/20  1237   CR 0.92 1.12 0.97   GFRESTIMATED >90 74 89     Recent Labs   Lab Test 03/21/21  0808 03/17/21  0748 09/10/20  1237 10/07/16  0948 10/07/16  0948   AST 20 17 27   < > 15   ALT 34 30 50   < > 25   ALKPHOS 100 91  --   --  80    < > = values in this interval not displayed.  "      PSYCHOTROPIC DRUG INTERACTIONS     Increased risk of QTc prolongation: Flexeril, trazodone, fluoxetine, aripiprazole, Suboxone  Increased risk of serotonin syndrome: Flexeril, escitalopram, oxycodone, trazodone  Increased risk of CNS/respiratory depression: Suboxone, trazodone, Klonopin, aripiprazole    MANAGEMENT:  Monitoring for adverse effects and patient is aware of risks    RISK STATEMENT for SAFETY     Jailene Breen does not appear to pose an immanent risk to self or others.    DIAGNOSES                       Major Depressive Disorder, recurrent, severe, without psychotic features  Generalized Anxiety Disorder, with panic    ASSESSMENT                       Today, patient presents with improving symptoms of depression, since hospitalization and no suicidal ideation.  He tells reports some ongoing symptoms of \"hopelessness\" and feels that IOP has been \"helpful\", but had a substitute  who \"did not really know what was going on\".  He is hoping to start individual therapy, as he believes that he needs some more individual attention.  Referrals for [facility] and [facility] previously placed, patient plans to follow-up on these.  Today also reporting ongoing \"fogginess\", which has gotten worse over the last month.  Unclear etiology, however he continues to take hydroxyzine to help with anxiety which she does not find effective.  Plan to discontinue hydroxyzine today, individual therapy follow-up, continue with IOP, and close clinic follow-up appointment.  No safety concerns.  Refill provided.      [place] was checked today:  Indicates no medication misuse .    PLAN                                                                                   1) Medications  - Continue fluoxetine 40mg daily   - Continue aripiprazole 5mg daily  - Continue prazosin 1mg at bedtime   - Continue trazodone 50mg at bedtime     - Discontinue hydroxyzine (patient taking previous " prescription)    - clonazepam 1mg by PCP    2) Therapy-  Continue with couples therapy.    3) Next Due   Labs- as needed  EKG- consider repeat EKG given multiple medications with risk of EKG prolongation, encouraged patient to follow-up with PCP.  Rating scales- serial PHQ9s    4) Referrals  No Referrals needed    5) RTC: 3 weeks    6) Crisis Numbers:   Provided routinely in AVS     After hours:  [phone]      TREATMENT RISK STATEMENT:  The risks, benefits, alternatives and potential adverse effects have been discussed and are understood by the pt. The pt understands the risks of using street drugs or alcohol. There are no medical contraindications, the pt agrees to treatment with the ability to do so. The pt knows to call the clinic for any problems or to access emergency care if needed.  Medical and substance use concerns are documented above.  Psychotropic drug interaction check was done, including changes made today.    PROVIDER: Slim Sage MD    Patient was not staffed via video.  Supervisor is Dr. Solo who will sign the note.  I did not see this patient directly. I have reviewed the documentation and I agree with the resident's plan of care.     Chanelle Solo MD

## 2021-05-27 NOTE — PATIENT INSTRUCTIONS
"Nice to see you today Les. As we discussed:   - Stop hydroxyzine, to see if \"fogginess\" improves  - Follow-up with therapy referrals.  - Follow-up for clinic visit in 3-4 weeks.      **For crisis resources, please see the information at the end of this document**     Patient Education      Thank you for coming to the [facility] [place] [facility].    Lab Testing:  If you had lab testing today and your results are reassuring or normal they will be mailed to you or sent through [facility] within 7 days. If the lab tests need quick action we will call you with the results. The phone number we will call with results is # [phone] (home) . If this is not the best number please call our clinic and change the number.    Medication Refills:  If you need any refills please call your pharmacy and they will contact us. Our fax number for refills is [phone]. Please allow three business for refill processing. If you need to  your refill at a new pharmacy, please contact the new pharmacy directly. The new pharmacy will help you get your medications transferred.     Scheduling:  If you have any concerns about today's visit or wish to schedule another appointment please call our office during normal business hours [phone] (8-5:00 M-F)    Contact Us:  Please call [phone] during business hours (8-5:00 M-F).  If after clinic hours, or on the weekend, please call  [phone].    Financial Assistance [phone]  [facility] Billing [phone]  Central Billing Office, [facility]: [phone]  [facility] Billing [phone]  Medical Records [phone]  [facility] Patient Bill of Rights [email or web address]       MENTAL HEALTH CRISIS NUMBERS:  For a medical emergency please call  911 or go to the nearest ER.     [facility]:   [facility] -[phone]   Crisis Residence [facility] " -[phone]   Walk-[facility] Counseling Center [facility] -[phone]   COPE 24/7 [facility] Mobile Team -[phone] (adults)/[phone] (child)  CHILD: [facility] needs assessment team - [phone]      [facility]:   [facility] - [phone]   Walk-in counseling [facility] - [phone]   Walk-in counseling [facility] - [phone]   Crisis Residence [facility] - [phone]  Urgent Care Adult Mental [place]-[phone] mobile unit/ 24/7 crisis line    National Crisis Numbers:   National Suicide Prevention Lifeline: 9-832-640-TALK (859-141-3066)  Poison Control Center - 5-306-314-8436  [facility]/resources for a list of additional resources (SOS)  Trans Lifeline a hotline for transgender people 1-852.341.1916  The Tu Project a hotline for LGBT youth 5-955-798-7082  Crisis Text Line: For any crisis 24/7   To: 719736  see www.crisistextline.org  - IF MAKING A CALL FEELS TOO HARD, send a text!         Again thank you for choosing [facility] HE[facility] and please let us know how we can best partner with you to improve you and your family's health.    You may be receiving a survey regarding this appointment. We would love to have your feedback, both positive and negative. The survey is done by an external company, so your answers are anonymous.

## 2021-05-28 ENCOUNTER — TELEPHONE (OUTPATIENT)
Dept: PSYCHIATRY | Facility: CLINIC | Age: 56
End: 2021-05-28

## 2021-05-28 ENCOUNTER — VIRTUAL VISIT (OUTPATIENT)
Dept: PSYCHIATRY | Facility: CLINIC | Age: 56
End: 2021-05-28
Attending: PSYCHIATRY & NEUROLOGY
Payer: COMMERCIAL

## 2021-05-28 DIAGNOSIS — F51.05 INSOMNIA DUE TO OTHER MENTAL DISORDER: ICD-10-CM

## 2021-05-28 DIAGNOSIS — F32.A DEPRESSION, UNSPECIFIED DEPRESSION TYPE: ICD-10-CM

## 2021-05-28 DIAGNOSIS — F33.1 MAJOR DEPRESSIVE DISORDER, RECURRENT EPISODE, MODERATE (H): ICD-10-CM

## 2021-05-28 DIAGNOSIS — F33.2 MAJOR DEPRESSIVE DISORDER, RECURRENT EPISODE, SEVERE WITH MIXED FEATURES (H): ICD-10-CM

## 2021-05-28 DIAGNOSIS — F99 INSOMNIA DUE TO OTHER MENTAL DISORDER: ICD-10-CM

## 2021-05-28 DIAGNOSIS — F41.1 GAD (GENERALIZED ANXIETY DISORDER): ICD-10-CM

## 2021-05-28 PROCEDURE — 99214 OFFICE O/P EST MOD 30 MIN: CPT | Mod: U7 | Performed by: STUDENT IN AN ORGANIZED HEALTH CARE EDUCATION/TRAINING PROGRAM

## 2021-05-28 RX ORDER — TRAZODONE HYDROCHLORIDE 50 MG/1
50 TABLET, FILM COATED ORAL AT BEDTIME
Qty: 30 TABLET | Refills: 1 | Status: ON HOLD | OUTPATIENT
Start: 2021-05-28 | End: 2021-06-21

## 2021-05-28 RX ORDER — FLUOXETINE 40 MG/1
40 CAPSULE ORAL DAILY
Qty: 30 CAPSULE | Refills: 1 | Status: SHIPPED | OUTPATIENT
Start: 2021-05-28 | End: 2021-06-29

## 2021-05-28 RX ORDER — PRAZOSIN HYDROCHLORIDE 1 MG/1
1 CAPSULE ORAL AT BEDTIME
Qty: 30 CAPSULE | Refills: 1 | Status: SHIPPED | OUTPATIENT
Start: 2021-05-28 | End: 2021-06-29

## 2021-05-28 RX ORDER — ARIPIPRAZOLE 5 MG/1
5 TABLET ORAL DAILY
Qty: 30 TABLET | Refills: 1 | Status: ON HOLD | OUTPATIENT
Start: 2021-05-28 | End: 2021-06-21

## 2021-05-28 NOTE — TELEPHONE ENCOUNTER
FUTURE VISIT INFORMATION      SURGERY INFORMATION:    Date: 6/3/21    Location:  gi    Surgeon:  Roseanne Craroll MD    Anesthesia Type:  mac    Procedure: ESOPHAGOGASTRODUODENOSCOPY, WITH BRAVO PH MONITORING DEVICE INSERTION    RECORDS REQUESTED FROM:       Primary Care Provider: Aiden Resendez PA- [facility]    Pertinent Medical History: Syncope, hypertension    Most recent EKG+ Tracin20- [facility]    Most recent Cardiac Stress Test: 18    Most recent PFT's: 18- [facility]

## 2021-05-28 NOTE — TELEPHONE ENCOUNTER
On 5/28/2021, at 0833, writer called patient at mobile to confirm Virtual Visit. Writer unable to make contact with patient. Writer left detailed voice message for callback. [phone], left as call back number. DELMI Kan, EMT

## 2021-05-31 ENCOUNTER — MYC MEDICAL ADVICE (OUTPATIENT)
Dept: PALLIATIVE MEDICINE | Facility: CLINIC | Age: 56
End: 2021-05-31

## 2021-05-31 DIAGNOSIS — M54.41 CHRONIC BILATERAL LOW BACK PAIN WITH RIGHT-SIDED SCIATICA: ICD-10-CM

## 2021-05-31 DIAGNOSIS — F11.20 UNCOMPLICATED OPIOID DEPENDENCE (H): ICD-10-CM

## 2021-05-31 DIAGNOSIS — M25.551 HIP PAIN, RIGHT: ICD-10-CM

## 2021-05-31 DIAGNOSIS — M51.369 DDD (DEGENERATIVE DISC DISEASE), LUMBAR: ICD-10-CM

## 2021-05-31 DIAGNOSIS — M45.7 ANKYLOSING SPONDYLITIS OF LUMBOSACRAL REGION (H): ICD-10-CM

## 2021-05-31 DIAGNOSIS — M05.79 RHEUMATOID ARTHRITIS INVOLVING MULTIPLE SITES WITH POSITIVE RHEUMATOID FACTOR (H): ICD-10-CM

## 2021-05-31 DIAGNOSIS — M54.59 LUMBAR FACET JOINT PAIN: ICD-10-CM

## 2021-05-31 DIAGNOSIS — G89.29 CHRONIC BILATERAL LOW BACK PAIN WITH RIGHT-SIDED SCIATICA: ICD-10-CM

## 2021-06-01 ENCOUNTER — HOSPITAL ENCOUNTER (OUTPATIENT)
Dept: BEHAVIORAL HEALTH | Facility: CLINIC | Age: 56
End: 2021-06-01
Attending: PSYCHIATRY & NEUROLOGY
Payer: COMMERCIAL

## 2021-06-01 PROCEDURE — 90853 GROUP PSYCHOTHERAPY: CPT | Mod: 95 | Performed by: SOCIAL WORKER

## 2021-06-01 PROCEDURE — 90853 GROUP PSYCHOTHERAPY: CPT | Mod: 95 | Performed by: PSYCHOLOGIST

## 2021-06-01 PROCEDURE — 90853 GROUP PSYCHOTHERAPY: CPT | Mod: GT | Performed by: PSYCHOLOGIST

## 2021-06-01 RX ORDER — BUPRENORPHINE AND NALOXONE 8; 2 MG/1; MG/1
FILM, SOLUBLE BUCCAL; SUBLINGUAL
Qty: 60 EACH | Refills: 0 | Status: SHIPPED | OUTPATIENT
Start: 2021-06-01 | End: 2021-07-09

## 2021-06-01 NOTE — TELEPHONE ENCOUNTER
Routed to the nursing pool and to the MA pool to process refill(s).    Per patient myChart message:  From: Jamison Breen      Created: 5/31/2021 4:00 AM      Hi, could you please refill the suboxone and put it out for more than every 2 weeks.  My pharmacy is [facility] in [place],  thank you.        Cha, RN-BSN  [facility]

## 2021-06-01 NOTE — TELEPHONE ENCOUNTER
Signed Prescriptions:                        Disp   Refills    buprenorphine HCl-naloxone HCl (SUBOXONE) *60 each0        Sig: Use 0.5 film under the tongue 4 times per day for           pain, approximately every 6 hours. Max 2 films           per day. Fill 6/1/21 and begin 6/3/2021.  30 day           supply  Authorizing Provider: SUSANA PETTY    Reviewed [place]  June 1, 2021- no concerning fills.    Susana Petty APRN, RN CNP, FNP  [facility]  [facility]

## 2021-06-01 NOTE — GROUP NOTE
Psychotherapy Group Note    PATIENT'S NAME: Jailene Breen  MRN:   [id number]  :   1965  ACCT. NUMBER: [id number]  DATE OF SERVICE: 21  START TIME: 11:00 AM  END TIME: 11:50 AM  FACILITATOR: Liliya Oh LICSW  TOPIC: MH EBP Group: Relationship Skills  [facility] Day Treatment  TRACK: 2A    NUMBER OF PARTICIPANTS: 5    Summary of Group / Topics Discussed:  Relationship Skills: Basic Communication: Patients were provided with a general overview of interpersonal communication skills and information about how communication skills impact symptoms and functioning. The goal of this topic is to help patients identify communication issues and gain skills to work towards healthier interpersonal communication. Patients reviewed their current awareness of communication issues and how communication skills impact relationships and functioning.      Patient Session Goals / Objectives:    Identified and discussed patients individual challenges with basic communication    Presented and practiced effective communication skills in session    Assisted patients in implementing more effective communication skills in their relationships  Telemedicine Visit: The patient's condition can be safely assessed and treated via synchronous audio and visual telemedicine encounter.      Reason for Telemedicine Visit: Services only offered telehealth    Originating Site (Patient Location): Patient's home    Distant Site (Provider Location): Provider Remote Setting    Consent:  The patient/guardian has verbally consented to: the potential risks and benefits of telemedicine (video visit) versus in person care; bill my insurance or make self-payment for services provided; and responsibility for payment of non-covered services.     Mode of Communication:  Video Conference via [employer]lite    As the provider I attest to compliance with applicable laws and regulations related to telemedicine.        Patient Participation / Response:  Fully participated with the group by sharing personal reflections / insights and openly received / provided feedback with other participants.    Identified / Expressed personal readiness to incorporate effective communication skills    Treatment Plan:  Patient has a current master individualized treatment plan.  See Epic treatment plan for more information.    Liliya Oh, [facility]

## 2021-06-01 NOTE — TELEPHONE ENCOUNTER
Medication refill information reviewed.     Due date for suboxone is 6/3/21     Prescriptions prepped for review.     Will route to provider.     Josias Lopez RN  Patient Care Supervisor   [facility] [place]

## 2021-06-01 NOTE — GROUP NOTE
Psychotherapy Group Note  Telemedicine Visit: The patient's condition can be safely assessed and treated via synchronous audio and visual telemedicine encounter.    Reason for Telemedicine Visit: Patient has requested telehealth visit  Originating Site (Patient Location): Patient's home  Distant Site (Provider Location): [facility] Outpatient Setting: Adult Day Tx  Consent:  The patient/guardian has verbally consented to: the potential risks and benefits of telemedicine (video visit) versus in person care; bill my insurance or make self-payment for services provided; and responsibility for payment of non-covered services.   Mode of Communication:  Video Conference via MyTrainer  As the provider I attest to compliance with applicable laws and regulations related to telemedicine.    PATIENT'S NAME: Jailene Breen  MRN:   [id number]  :   1965  ACCT. NUMBER: [id number]  DATE OF SERVICE: 21  START TIME: 10:00 AM  END TIME: 10:50 AM  FACILITATOR: Josefina Beauchamp PsyD  TOPIC:  EBP Group: Relationship Skills  [facility] Adult Mental Health Day Treatment  TRACK: 2A    NUMBER OF PARTICIPANTS: 7    Summary of Group / Topics Discussed:  Relationship Skills: Conflict Resolution: Topic of conflict resolution in interpersonal communication was discussed. Patients received an overview of how communication skills during times of conflict impact symptoms and functioning. Patients discussed their current communication challenges and how this impacts their functioning. Patients also verbalized understanding of skills learned and practiced in session.     Patient Session Goals / Objectives:    Identified and discussed patient individual challenges with communication    Presented and practiced effective communication skills in session    Assisted patients in implementing more effective communication skills in their relationships      Patient Participation / Response:  Fully participated with the group by  sharing personal reflections / insights and openly received / provided feedback with other participants.    Identified / Expressed personal readiness to incorporate effective communication skills    Treatment Plan:  Patient has a current master individualized treatment plan.  See Epic treatment plan for more information.    Alisia Woodward Psy., D,  Licensed Clinical Psychologist

## 2021-06-01 NOTE — TELEPHONE ENCOUNTER
Received call from patient requesting refill(s) of suboxone     Last dispensed from pharmacy on 5/19/21    Patient's last office/virtual visit by prescribing provider on 4/21/21  Next office/virtual appointment scheduled for none    Last urine drug screen date 3/21/21  Current opioid agreement on file (completed within the last year) No Date of opioid agreement: 1/21/20    E-prescribe to [facility]    Will route to nursing [place] for review and preparation of prescription(s).

## 2021-06-01 NOTE — PROGRESS NOTES
Preoperative Assessment Center Medication History Note    Medication history completed on June 1, 2021 by this writer. See Epic admission navigator for prior to admission medications. Operating room staff will still need to confirm medications and last dose information on day of surgery.     Medication history interview sources:  patient, payor information.     Changes made to PTA medication list (reason)  Added: ergocalciferol,   Deleted: cholecalciferol  - voltaren gel, lidocaine patch, lidocaine ointment, Bengay (patinet not taking any of these and no plan to use in the future).   Changed: methotrexate, medical cannibis, omeprazole,     Additional medication history information (including reliability of information, actions taken by pharmacist):    -- No recent (within 30 days) course of antibiotics  -- No recent (within 30 days) course of systemic steroids  -- Patient declines being on any other prescription or over-the-counter medications    Pain Medication Quantification - Patient is currently scheduled for a same day surgery. If this plan changes and patient is admitted, the inpatient pain management service could be consulted for specific pain management recommendations (available at pager [phone] from 8 AM - 3 PM Mon - Fri and available via phone answering service 24/7 at [phone]).     - OUTPATIENT MEDICATIONS (related to pain management):  -- Long-acting opioid: buprenorphine-naloxone 8/2 mg films - take 0.5 film (4-1 mg) by mouth QID - max 2 films/day   - OK to continue this Suboxone for upcoming same day surgery.    -- Short-acting opioid: none    Prior to Admission medications    Medication Sig Last Dose Taking? Auth Provider   acetaminophen (TYLENOL) 500 MG tablet Take 1,000 mg by mouth every 6 hours as needed for mild pain (headache) Taking Yes Unknown, Entered By History   ARIPiprazole (ABILIFY) 5 MG tablet Take 1 tablet (5 mg) by mouth daily Taking Yes Coffin, Richard Breyen, MD   atenolol  (TENORMIN) 25 MG tablet Take 1 tablet (25 mg) by mouth daily Taking Yes Aiden Resendez PA   buprenorphine HCl-naloxone HCl (SUBOXONE) 8-2 MG per film Use 0.5 film under the tongue 4 times per day for pain, approximately every 6 hours. Max 2 films per day. Fill 6/1/21 and begin 6/3/2021.  30 day supply Taking Yes Susana Pike APRN CNP   chlorzoxazone (PARAFON FORTE) 500 MG tablet Take 1 tablet (500 mg) by mouth 3 times daily as needed for muscle spasms Taking Yes Susana Pike APRN CNP   clonazePAM (KLONOPIN) 1 MG tablet TAKE ONE-HALF - 1 TABLET BY MOUTH ONCE DAILY AS NEEDED FOR ANXIETY Taking Yes Aiden Resendez PA   etanercept (ENBREL SURECLICK) 50 MG/ML autoinjector Inject 50 mg Subcutaneous once a week Taking Yes Raghu Parada MD   fish oil-omega-3 fatty acids 1000 MG capsule Take 1 g by mouth daily Taking Yes Unknown, Entered By History   FLUoxetine (PROZAC) 40 MG capsule Take 1 capsule (40 mg) by mouth daily Taking Yes Coffin, Richard Breyen, MD   folic acid (FOLVITE) 1 MG tablet Take 5 tablets (5 mg) by mouth daily Taking Yes Saul Quesada MD   liothyronine (CYTOMEL) 25 MCG tablet Take 1 tablet (25 mcg) by mouth daily Taking Yes Saul Quesada MD   medical cannabis (Patient's own supply) See Admin Instructions (The purpose of this order is to document that the patient reports taking medical cannabis.  This is not a prescription, and is not used to certify that the patient has a qualifying medical condition.)     Pills PRN   Lozenges PRN Taking Yes Reported, Patient   menthol, Topical Analgesic, 2.5% (BENGAY VANISHIN SCENT) 2.5 % GEL topical gel Apply topically every 6 hours as needed for moderate pain Taking Yes Saul Quesada MD   methotrexate sodium 2.5 MG TABS Take 4 tablets (10 mg) by mouth every 7 days  Patient taking differently: Take 4 tablets by mouth every 7 days Every Thursday Taking Yes Raghu Parada MD   multivitamin (CENTRUM SILVER) tablet Take 1  tablet by mouth daily Taking Yes Unknown, Entered By History   nicotine polacrilex (NICORETTE) 4 MG gum PLACE 1 PIECE INSIDE THE CHEEK AS NEEDED FOR SMOKING CESSATION Taking Yes Aiden Resendez PA   omeprazole (PRILOSEC) 20 MG DR capsule Take 20 mg by mouth daily as needed  Taking Yes Unknown, Entered By History   prazosin (MINIPRESS) 1 MG capsule Take 1 capsule (1 mg) by mouth At Bedtime Taking Yes Coffin, Richard Breyen, MD   traZODone (DESYREL) 50 MG tablet Take 1 tablet (50 mg) by mouth At Bedtime Taking Yes Coffin, Richard Breyen, MD   vitamin D2 (ERGOCALCIFEROL) 46727 units (1250 mcg) capsule Take 50,000 Units by mouth twice a week mon and thurs Taking Yes Unknown, Entered By History     Medication history completed by: Brody [facility]

## 2021-06-01 NOTE — TELEPHONE ENCOUNTER
Spoke to patient, notified that prescription was sent to preferred pharmacy.    Able to fill 06/01/21 and start 06/03/21      Joy Kessler MA  [facility] [place]

## 2021-06-01 NOTE — GROUP NOTE
Process Group Note  Telemedicine Visit: The patient's condition can be safely assessed and treated via synchronous audio and visual telemedicine encounter.    Reason for Telemedicine Visit: Patient has requested telehealth visit  Originating Site (Patient Location): Patient's home  Distant Site (Provider Location): [facility] Outpatient Setting: Adult Day Tx  Consent:  The patient/guardian has verbally consented to: the potential risks and benefits of telemedicine (video visit) versus in person care; bill my insurance or make self-payment for services provided; and responsibility for payment of non-covered services.   Mode of Communication:  Video Conference via TouchPo Android POS  As the provider I attest to compliance with applicable laws and regulations related to telemedicine.    PATIENT'S NAME: Jailene Breen  MRN:   [id number]  :   1965  ACCT. NUMBER: [id number]  DATE OF SERVICE: 21  START TIME:  9:00 AM  END TIME:  9:50 AM  FACILITATOR: Josefina Beauchamp PsyD  TOPIC: MH Process Group    Diagnoses:      Major Depressive Disorder, recurrent, moderate  JEFF  Panic Disorder    Rheumatoid Arthritis    Fredo Hughes MD MD Orthopedics [address]   Phone: [phone]; Fax: [phone]      Aiden Resendez PA  Assigned PCP  2020   Namita Sharma MD Resident Student in organized health care education/training program [address]  20   Phone: [phone]; Fax: [phone]      Barbie Christie, PhD LP Psychologist Licensed Mental Health [address]  20   Phone: [phone]; Fax: [phone]      Comment: supervising      Susana Pike APRN CNP Referring Physician Nurse Practitioner [address]  20   Phone: [phone]; Fax: [phone]      Raghu Parada MD  Assigned Rheumatology Provider  10/23/2020   10/28/20            Chanelle Solo MD MD Psychiatry [address]  21   Phone: [phone];  Fax: [phone]      Comment: Attending      Coffin, Richard Breyen, MD Resident Psychiatry 02/24/2021 End  2/24/21   Phone: [phone]; Fax: [phone]          [facility] Adult Mental Health Day Treatment  TRACK: 2A    NUMBER OF PARTICIPANTS: 7          Data:    Session content: At the start of this group, patients were invited to check in by identifying themselves, describing their current emotional status, and identifying issues to address in this group.   Area(s) of treatment focus addressed in this session included Symptom Management, Personal Safety and Community Resources/Discharge Planning.  Client reported being safe today.  Reported mood is  Depressed.  Goal for today is to attend therapy groups.  The client talked to the group about how he feels unworthy because he is unable to provide for his family, work and make money. The group discussed cultural expectations for men and how he has to take care of himself.       Therapeutic Interventions/Treatment Strategies:  Psychotherapist reinforced use of skills. Treatment modalities used include Cognitive Behavioral Therapy and Dialectical Behavioral Therapy. Interventions include Cognitive Restructuring:  Facilitated recognition of the connection between negative thoughts and negative core beliefs, Coping Skills: Reviewed patients current calming practices and discussed a more formal way of practicing and accessing skills, Relapse Prevention: Coached on skills to manage factors that contribute to relapse and Mindfulness: Facilitated discussion of when/how to use mindfulness skills to benefit general health, mental health symptoms, and stressors.    Assessment:    Patient response:   Patient responded to session by giving feedback, listening and being attentive    Possible barriers to participation / learning include: severity of symptoms    Health Issues:   Yes: Pain, Associated Psychological Distress       Substance Use Review:   Substance Use:  No active concerns identified.    Mental Status/Behavioral Observations  Appearance:   Appropriate   Eye Contact:   Good   Psychomotor Behavior: Normal   Attitude:   Cooperative   Orientation:   All  Speech   Rate / Production: Normal    Volume:  Normal   Mood:    Anxious  Depressed  Sad   Affect:    Constricted   Thought Content:   Rumination  Thought Form:  Coherent  Logical     Insight:    Good  and Fair     Plan:     Safety Plan: Recommended that patient call 911 or go to the local ED should there be a change in any of these risk factors.     Barriers to treatment: None identified    Patient Contracts (see media tab):  None    Substance Use: Provided encouragement towards sobriety     Continue or Discharge: Patient will continue in Adult Day Treatment (ADT)  as planned. Patient is likely to benefit from learning and using skills as they work toward the goals identified in their treatment plan.      Josefina Baeuchamp PsyD  June 1, 2021  Elpidio Blake, TIRSO,  Licensed Clinical Psychologist

## 2021-06-02 ENCOUNTER — VIRTUAL VISIT (OUTPATIENT)
Dept: SURGERY | Facility: CLINIC | Age: 56
End: 2021-06-02
Payer: COMMERCIAL

## 2021-06-02 ENCOUNTER — PRE VISIT (OUTPATIENT)
Dept: SURGERY | Facility: CLINIC | Age: 56
End: 2021-06-02

## 2021-06-02 ENCOUNTER — ANESTHESIA EVENT (OUTPATIENT)
Dept: GASTROENTEROLOGY | Facility: CLINIC | Age: 56
End: 2021-06-02
Payer: COMMERCIAL

## 2021-06-02 DIAGNOSIS — Z11.59 ENCOUNTER FOR SCREENING FOR OTHER VIRAL DISEASES: ICD-10-CM

## 2021-06-02 DIAGNOSIS — Z01.818 PRE-OP EVALUATION: Primary | ICD-10-CM

## 2021-06-02 PROCEDURE — U0005 INFEC AGEN DETEC AMPLI PROBE: HCPCS | Mod: 90 | Performed by: PATHOLOGY

## 2021-06-02 PROCEDURE — 99203 OFFICE O/P NEW LOW 30 MIN: CPT | Mod: 95 | Performed by: PHYSICIAN ASSISTANT

## 2021-06-02 PROCEDURE — U0003 INFECTIOUS AGENT DETECTION BY NUCLEIC ACID (DNA OR RNA); SEVERE ACUTE RESPIRATORY SYNDROME CORONAVIRUS 2 (SARS-COV-2) (CORONAVIRUS DISEASE [COVID-19]), AMPLIFIED PROBE TECHNIQUE, MAKING USE OF HIGH THROUGHPUT TECHNOLOGIES AS DESCRIBED BY CMS-2020-01-R: HCPCS | Mod: 90 | Performed by: PATHOLOGY

## 2021-06-02 RX ORDER — ERGOCALCIFEROL 1.25 MG/1
50000 CAPSULE, LIQUID FILLED ORAL
COMMUNITY
End: 2021-11-30

## 2021-06-02 ASSESSMENT — LIFESTYLE VARIABLES: TOBACCO_USE: 0

## 2021-06-02 ASSESSMENT — PAIN SCALES - GENERAL: PAINLEVEL: EXTREME PAIN (9)

## 2021-06-02 NOTE — H&P (VIEW-ONLY)
Pre-Operative H & P         Video-Visit Details    Type of service:  Video Visit    Patient verbally consented to video service today: YES      Video Start Time: about 1545  Video End Time (time video stopped): about 1600    Originating Location (pt. Location): Home    Distant Location (provider location):  [facility]     Mode of Communication:  Video Conference via Zafin      CC:  Preoperative exam to assess for increased cardiopulmonary risk while undergoing surgery and anesthesia.    Date of Encounter: 6/2/2021  Primary Care Physician:  Aiden Resendez  associated diagnosis: hiatal hernia    HPI  Jailene Breen is a 55 year old male who presents for pre-operative H & P in preparation for ESOPHAGOGASTRODUODENOSCOPY, WITH BRAVO PH MONITORING DEVICE INSERTION with Dr. [place] [facility] [place] being evaluated for comorbid conditions ofhypertension, hypothyroid, GERD, depression and anxiety, ankylosing spondylitis, RA      Mr. Breen has a history of GERD using Prilosec. He is now scheduled for the above procedure for further evaluation.      History is obtained from the patient and chart review.      Past Medical History  Past Medical History:   Diagnosis Date     Ankylosing spondylitis (H) 3/1/2017     Anxiety      Arthritis     back, knees     Back pain     possible drug seeking behavior in the past.      Hypertension      Panic attacks        Past Surgical History  Past Surgical History:   Procedure Laterality Date     ARTHROSCOPY KNEE Right 9/22/2016    Procedure: ARTHROSCOPY KNEE;  Surgeon: Fredo Hughes MD;  Location:  OR     COMBINED ESOPHAGOSCOPY, GASTROSCOPY, DUODENOSCOPY (EGD) WITH CO2 INSUFFLATION N/A 1/19/2021    Procedure: ESOPHAGOGASTRODUODENOSCOPY, WITH CO2 INSUFFLATION;  Surgeon: Brandon Mack MD;  Location:  OR     ESOPHAGOSCOPY, GASTROSCOPY, DUODENOSCOPY (EGD), COMBINED N/A 1/19/2021    Procedure:  Esophagogastroduodenoscopy, With Biopsy;  Surgeon: Brandon Mack MD;  Location: [facility] OR     INJECT PARAVERTEBRAL FACET JOINT LUMBAR / SACRAL FIRST Right 11/25/2016    Procedure: INJECT PARAVERTEBRAL FACET JOINT LUMBAR / SACRAL FIRST;  Surgeon: Doretha Magallanes MD;  Location: [facility] OR     ORTHOPEDIC SURGERY      Rt knee X 2     ORTHOPEDIC SURGERY      Lt foot       Hx of Blood transfusions/reactions: deneis     Hx of abnormal bleeding or anti-platelet use: denies    Menstrual history: No LMP for male patient.    Steroid use in the last year: denies    Personal or FH with difficulty with Anesthesia:  denies    Prior to Admission Medications  Current Outpatient Medications   Medication Sig Dispense Refill     acetaminophen (TYLENOL) 500 MG tablet Take 1,000 mg by mouth every 6 hours as needed for mild pain (headache)       ARIPiprazole (ABILIFY) 5 MG tablet Take 1 tablet (5 mg) by mouth daily 30 tablet 1     atenolol (TENORMIN) 25 MG tablet Take 1 tablet (25 mg) by mouth daily 90 tablet 1     buprenorphine HCl-naloxone HCl (SUBOXONE) 8-2 MG per film Use 0.5 film under the tongue 4 times per day for pain, approximately every 6 hours. Max 2 films per day. Fill 6/1/21 and begin 6/3/2021.  30 day supply 60 each 0     chlorzoxazone (PARAFON FORTE) 500 MG tablet Take 1 tablet (500 mg) by mouth 3 times daily as needed for muscle spasms 45 tablet 1     clonazePAM (KLONOPIN) 1 MG tablet TAKE ONE-HALF - 1 TABLET BY MOUTH ONCE DAILY AS NEEDED FOR ANXIETY 30 tablet 0     etanercept (ENBREL SURECLICK) 50 MG/ML autoinjector Inject 50 mg Subcutaneous once a week 1 mL 3     fish oil-omega-3 fatty acids 1000 MG capsule Take 1 g by mouth daily       FLUoxetine (PROZAC) 40 MG capsule Take 1 capsule (40 mg) by mouth daily 30 capsule 1     folic acid (FOLVITE) 1 MG tablet Take 5 tablets (5 mg) by mouth daily 150 tablet 3     liothyronine (CYTOMEL) 25 MCG tablet Take 1 tablet (25 mcg) by mouth daily 30 tablet 3     medical cannabis  "(Patient's own supply) See Admin Instructions (The purpose of this order is to document that the patient reports taking medical cannabis.  This is not a prescription, and is not used to certify that the patient has a qualifying medical condition.)     Pills PRN   Lozenges PRN       menthol, Topical Analgesic, 2.5% (BENGAY VANISHIN SCENT) 2.5 % GEL topical gel Apply topically every 6 hours as needed for moderate pain 45 g 3     methotrexate sodium 2.5 MG TABS Take 4 tablets (10 mg) by mouth every 7 days (Patient taking differently: Take 4 tablets by mouth every 7 days Every Thursday) 48 tablet 0     multivitamin (CENTRUM SILVER) tablet Take 1 tablet by mouth daily       nicotine polacrilex (NICORETTE) 4 MG gum PLACE 1 PIECE INSIDE THE CHEEK AS NEEDED FOR SMOKING CESSATION 200 each 3     omeprazole (PRILOSEC) 20 MG DR capsule Take 20 mg by mouth daily as needed        prazosin (MINIPRESS) 1 MG capsule Take 1 capsule (1 mg) by mouth At Bedtime 30 capsule 1     traZODone (DESYREL) 50 MG tablet Take 1 tablet (50 mg) by mouth At Bedtime 30 tablet 1     vitamin D2 (ERGOCALCIFEROL) 91891 units (1250 mcg) capsule Take 50,000 Units by mouth twice a week mon and thurs         Allergies  Allergies   Allergen Reactions     Mirtazapine      Other reaction(s): Coma     Hydrocodone Itching     Ms Contin [Morphine] Itching     Remeron Soltab Other (See Comments)     \"Blacked out\" for one week       Social History  Social History     Socioeconomic History     Marital status:      Spouse name: Not on file     Number of children: Not on file     Years of education: Not on file     Highest education level: Not on file   Occupational History     Not on file   Social Needs     Financial resource strain: Not on file     Food insecurity     Worry: Not on file     Inability: Not on file     Transportation needs     Medical: Not on file     Non-medical: Not on file   Tobacco Use     Smoking status: Never Smoker     Smokeless tobacco: " Former User     Types: Chew     Tobacco comment: Chew   Substance and Sexual Activity     Alcohol use: No     Comment: none     Drug use: No     Sexual activity: Yes     Partners: Female     Comment: decreased with Prozac   Lifestyle     Physical activity     Days per week: Not on file     Minutes per session: Not on file     Stress: Not on file   Relationships     Social connections     Talks on phone: Not on file     Gets together: Not on file     Attends [employer] service: Not on file     Active member of club or organization: Not on file     Attends meetings of clubs or organizations: Not on file     Relationship status: Not on file     Intimate partner violence     Fear of current or ex partner: Not on file     Emotionally abused: Not on file     Physically abused: Not on file     Forced sexual activity: Not on file   Other Topics Concern     Parent/sibling w/ CABG, MI or angioplasty before 65F 55M? Not Asked   Social History Narrative     Not on file       Family History  Family History   Problem Relation Age of Onset     Autoimmune Disease No family hx of      Anesthesia Reaction No family hx of      Deep Vein Thrombosis (DVT) No family hx of      ROS/MED HX  ENT/Pulmonary:  - neg pulmonary ROS  (-) tobacco use   Neurologic:  - neg neurologic ROS     Cardiovascular:     (+) hypertension-----Previous cardiac testing   Echo: Date: 2017 Results:    Stress Test: Date: 2018 Results:    ECG Reviewed: Date: Results:    Cath: Date: Results:   (-) taking anticoagulants/antiplatelets   METS/Exercise Tolerance: 3 - Able to walk 1-2 blocks without stopping Comment: Was a marathon runner until a couple years ago. Now limited due to back and leg pain   Hematologic:  - neg hematologic  ROS  (-) history of blood transfusion   Musculoskeletal: Comment: Ankylosing spondylitis       GI/Hepatic:     (+) GERD,     Renal/Genitourinary:  - neg Renal ROS     Endo:     (+) thyroid problem, hypothyroidism, Obesity (BMI 31),      Psychiatric/Substance Use:     (+) psychiatric history anxiety and depression     Infectious Disease:  - neg infectious disease ROS     Malignancy:       Other: Comment: RA           The complete review of systems is negative other than noted in the HPI or here.    0 lbs 0 oz  Data Unavailable   There is no height or weight on file to calculate BMI.       Physical Exam  Constitutional: Awake, alert, cooperative, no apparent distress, and appears stated age.  Respiratory: non labored breathing   Neuropsychiatric: Calm, cooperative. Normal affect.     Please refer to the physical examination documented by the anesthesiologist in the anesthesia record on the day of surgery    Labs: (personally reviewed)  Component      Latest Ref Rng & Units 3/21/2021   WBC      4.0 - 11.0 10e9/L 6.8   RBC Count      4.4 - 5.9 10e12/L 5.18   Hemoglobin      13.3 - 17.7 g/dL 15.6   Hematocrit      40.0 - 53.0 % 45.7   MCV      78 - 100 fl 88   MCH      26.5 - 33.0 pg 30.1   MCHC      31.5 - 36.5 g/dL 34.1   RDW      10.0 - 15.0 % 13.1   Platelet Count      150 - 450 10e9/L 246   Diff Method       Automated Method   % Neutrophils      % 53.7   % Lymphocytes      % 31.9   % Monocytes      % 8.9   % Eosinophils      % 4.2   % Basophils      % 1.0   % Immature Granulocytes      % 0.3   Nucleated RBCs      0 /100 0   Absolute Neutrophil      1.6 - 8.3 10e9/L 3.7   Absolute Lymphocytes      0.8 - 5.3 10e9/L 2.2   Absolute Monocytes      0.0 - 1.3 10e9/L 0.6   Absolute Eosinophils      0.0 - 0.7 10e9/L 0.3   Absolute Basophils      0.0 - 0.2 10e9/L 0.1   Abs Immature Granulocytes      0 - 0.4 10e9/L 0.0   Absolute Nucleated RBC       0.0   Sodium      133 - 144 mmol/L 141   Potassium      3.4 - 5.3 mmol/L 4.0   Chloride      94 - 109 mmol/L 108   Carbon Dioxide      20 - 32 mmol/L 29   Anion Gap      3 - 14 mmol/L 4   Glucose      70 - 99 mg/dL 95   Urea Nitrogen      7 - 30 mg/dL 12   Creatinine      0.66 - 1.25 mg/dL 0.92   GFR Estimate       >60 mL/min/1.73:m2 >90   GFR Estimate If Black      >60 mL/min/1.73:m2 >90   Calcium      8.5 - 10.1 mg/dL 8.3 (L)   Bilirubin Total      0.2 - 1.3 mg/dL 0.7   Albumin      3.4 - 5.0 g/dL 3.5   Protein Total      6.8 - 8.8 g/dL 7.2   Alkaline Phosphatase      40 - 150 U/L 100   ALT      0 - 70 U/L 34   AST      0 - 45 U/L 20       EKG 2017   SR with SA      Stress test 2018   Interpretation Summary   Normal, low-risk exercise echocardiogram.   The target heart rate was achieved. Normal heart rate and BP response to   exercise.   Normal biventricular size, thickness, and global systolic function at   baseline, LVEF=55-60%.   With exercise, LVEF increased to >70% and LV cavity size decreased   appropriately.   No regional wall motion abnormalities are present at rest or with exercise.   No angina was elicited.   No ECG evidence of ischemia.   Functional capacity is normal for age.   No significant valvular abnormalities are noted on screening Doppler exam.   The aortic root and visualized ascending aorta are normal.       ASSESSMENT and PLAN  Jamison Breen is a 55 year old male scheduled for ESOPHAGOGASTRODUODENOSCOPY, WITH BRAVO PH MONITORING DEVICE INSERTION on 6/3/21 by Dr. Gaspar in treatment of GERD.  PAC referral for risk assessment and optimization for anesthesia with comorbid conditions of hypertension, hypothyroid, GERD, depression and anxiety, ankylosing spondylitis, RA:      Pre-operative considerations:   1.  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 denies cardiac symptoms. Previous cardiac testing above. low risk surgery with 0.4% (RCRI #) risk of major adverse cardiac event.   ~hypertension using atenolol      2.  Pulm: CRISTINE risk: intermediate.   ~nonsmoker   ~denies pulmonary symptoms      3.  GI:  Risk of PONV score = 2.  If > 2, anti-emetic intervention recommended.   ~GERD using Prilosec   ~above  procedure planned       4.  Endo: hypothyroid using liothyronine      5. psych: depression and anxiety using abilify, Prozac and trazodone. Reports symptoms stable.       6. rheum: ankylosing spondylosis and RA. using etanercept and methotrexate. He is due for these tomorrow- recommended he wait until after his procedure tomorrow morning and check with surgeon to ask if he can take tomorrow afternoon       7. Chronic pain- uses suboxone. Can continue tomorrow for this same day procedure      VTE risk: 0.5%     Patient is optimized and is acceptable candidate for the proposed procedure.  No further diagnostic evaluation is needed.     **Physical exam and vital signs not completed today as this visit was scheduled as a virtual visit during Covid 19 pandemic. Physical exam should be completed the DOS in pre-op**    30 minutes were spent completing chart review, seeing the patient, reviewing labs and test results and completing documentation       Olga Camarena PA-C  Preoperative Assessment Center  [facility]  Phone: [phone]  Fax: [phone]

## 2021-06-02 NOTE — PROGRESS NOTES
Jamison is a 55 year old who is being evaluated via a billable video visit.      How would you like to obtain your AVS? MyChart  HPI         Review of Systems   Objective    Vitals - Patient Reported  Pain Score: Extreme Pain (9)        Physical Exam     YOLANDA Norton LPN

## 2021-06-02 NOTE — H&P
Pre-Operative H & P         Video-Visit Details    Type of service:  Video Visit    Patient verbally consented to video service today: YES      Video Start Time: about 1545  Video End Time (time video stopped): about 1600    Originating Location (pt. Location): Home    Distant Location (provider location):  [facility]     Mode of Communication:  Video Conference via YuM[employer]      CC:  Preoperative exam to assess for increased cardiopulmonary risk while undergoing surgery and anesthesia.    Date of Encounter: 6/2/2021  Primary Care Physician:  Aiden Resendez  associated diagnosis: hiatal hernia    HPI  Jailene Breen is a 55 year old male who presents for pre-operative H & P in preparation for ESOPHAGOGASTRODUODENOSCOPY, WITH BRAVO PH MONITORING DEVICE INSERTION with Dr. Carroll on 6/3/21 at [facility] [place]. Patient is being evaluated for comorbid conditions ofhypertension, hypothyroid, GERD, depression and anxiety, ankylosing spondylitis, RA      Mr. Breen has a history of GERD using Prilosec. He is now scheduled for the above procedure for further evaluation.      History is obtained from the patient and chart review.      Past Medical History  Past Medical History:   Diagnosis Date     Ankylosing spondylitis (H) 3/1/2017     Anxiety      Arthritis     back, knees     Back pain     possible drug seeking behavior in the past.      Hypertension      Panic attacks        Past Surgical History  Past Surgical History:   Procedure Laterality Date     ARTHROSCOPY KNEE Right 9/22/2016    Procedure: ARTHROSCOPY KNEE;  Surgeon: Fredo Hughes MD;  Location:  OR     COMBINED ESOPHAGOSCOPY, GASTROSCOPY, DUODENOSCOPY (EGD) WITH CO2 INSUFFLATION N/A 1/19/2021    Procedure: ESOPHAGOGASTRODUODENOSCOPY, WITH CO2 INSUFFLATION;  Surgeon: Brandon Mack MD;  Location:  OR     ESOPHAGOSCOPY, GASTROSCOPY, DUODENOSCOPY (EGD), COMBINED N/A 1/19/2021    Procedure:  Esophagogastroduodenoscopy, With Biopsy;  Surgeon: Brandon Mack MD;  Location: [facility] OR     INJECT PARAVERTEBRAL FACET JOINT LUMBAR / SACRAL FIRST Right 11/25/2016    Procedure: INJECT PARAVERTEBRAL FACET JOINT LUMBAR / SACRAL FIRST;  Surgeon: Doretha Magallanes MD;  Location: [facility] OR     ORTHOPEDIC SURGERY      Rt knee X 2     ORTHOPEDIC SURGERY      Lt foot       Hx of Blood transfusions/reactions: deneis     Hx of abnormal bleeding or anti-platelet use: denies    Menstrual history: No LMP for male patient.    Steroid use in the last year: denies    Personal or FH with difficulty with Anesthesia:  denies    Prior to Admission Medications  Current Outpatient Medications   Medication Sig Dispense Refill     acetaminophen (TYLENOL) 500 MG tablet Take 1,000 mg by mouth every 6 hours as needed for mild pain (headache)       ARIPiprazole (ABILIFY) 5 MG tablet Take 1 tablet (5 mg) by mouth daily 30 tablet 1     atenolol (TENORMIN) 25 MG tablet Take 1 tablet (25 mg) by mouth daily 90 tablet 1     buprenorphine HCl-naloxone HCl (SUBOXONE) 8-2 MG per film Use 0.5 film under the tongue 4 times per day for pain, approximately every 6 hours. Max 2 films per day. Fill 6/1/21 and begin 6/3/2021.  30 day supply 60 each 0     chlorzoxazone (PARAFON FORTE) 500 MG tablet Take 1 tablet (500 mg) by mouth 3 times daily as needed for muscle spasms 45 tablet 1     clonazePAM (KLONOPIN) 1 MG tablet TAKE ONE-HALF - 1 TABLET BY MOUTH ONCE DAILY AS NEEDED FOR ANXIETY 30 tablet 0     etanercept (ENBREL SURECLICK) 50 MG/ML autoinjector Inject 50 mg Subcutaneous once a week 1 mL 3     fish oil-omega-3 fatty acids 1000 MG capsule Take 1 g by mouth daily       FLUoxetine (PROZAC) 40 MG capsule Take 1 capsule (40 mg) by mouth daily 30 capsule 1     folic acid (FOLVITE) 1 MG tablet Take 5 tablets (5 mg) by mouth daily 150 tablet 3     liothyronine (CYTOMEL) 25 MCG tablet Take 1 tablet (25 mcg) by mouth daily 30 tablet 3     medical cannabis  "(Patient's own supply) See Admin Instructions (The purpose of this order is to document that the patient reports taking medical cannabis.  This is not a prescription, and is not used to certify that the patient has a qualifying medical condition.)     Pills PRN   Lozenges PRN       menthol, Topical Analgesic, 2.5% (BENGAY VANISHIN SCENT) 2.5 % GEL topical gel Apply topically every 6 hours as needed for moderate pain 45 g 3     methotrexate sodium 2.5 MG TABS Take 4 tablets (10 mg) by mouth every 7 days (Patient taking differently: Take 4 tablets by mouth every 7 days Every Thursday) 48 tablet 0     multivitamin (CENTRUM SILVER) tablet Take 1 tablet by mouth daily       nicotine polacrilex (NICORETTE) 4 MG gum PLACE 1 PIECE INSIDE THE CHEEK AS NEEDED FOR SMOKING CESSATION 200 each 3     omeprazole (PRILOSEC) 20 MG DR capsule Take 20 mg by mouth daily as needed        prazosin (MINIPRESS) 1 MG capsule Take 1 capsule (1 mg) by mouth At Bedtime 30 capsule 1     traZODone (DESYREL) 50 MG tablet Take 1 tablet (50 mg) by mouth At Bedtime 30 tablet 1     vitamin D2 (ERGOCALCIFEROL) 12396 units (1250 mcg) capsule Take 50,000 Units by mouth twice a week mon and thurs         Allergies  Allergies   Allergen Reactions     Mirtazapine      Other reaction(s): Coma     Hydrocodone Itching     Ms Contin [Morphine] Itching     Remeron Soltab Other (See Comments)     \"Blacked out\" for one week       Social History  Social History     Socioeconomic History     Marital status:      Spouse name: Not on file     Number of children: Not on file     Years of education: Not on file     Highest education level: Not on file   Occupational History     Not on file   Social Needs     Financial resource strain: Not on file     Food insecurity     Worry: Not on file     Inability: Not on file     Transportation needs     Medical: Not on file     Non-medical: Not on file   Tobacco Use     Smoking status: Never Smoker     Smokeless tobacco: " Former User     Types: Chew     Tobacco comment: Chew   Substance and Sexual Activity     Alcohol use: No     Comment: none     Drug use: No     Sexual activity: Yes     Partners: Female     Comment: decreased with Prozac   Lifestyle     Physical activity     Days per week: Not on file     Minutes per session: Not on file     Stress: Not on file   Relationships     Social connections     Talks on phone: Not on file     Gets together: Not on file     Attends [employer] service: Not on file     Active member of club or organization: Not on file     Attends meetings of clubs or organizations: Not on file     Relationship status: Not on file     Intimate partner violence     Fear of current or ex partner: Not on file     Emotionally abused: Not on file     Physically abused: Not on file     Forced sexual activity: Not on file   Other Topics Concern     Parent/sibling w/ CABG, MI or angioplasty before 65F 55M? Not Asked   Social History Narrative     Not on file       Family History  Family History   Problem Relation Age of Onset     Autoimmune Disease No family hx of      Anesthesia Reaction No family hx of      Deep Vein Thrombosis (DVT) No family hx of      ROS/MED HX  ENT/Pulmonary:  - neg pulmonary ROS  (-) tobacco use   Neurologic:  - neg neurologic ROS     Cardiovascular:     (+) hypertension-----Previous cardiac testing   Echo: Date: 2017 Results:    Stress Test: Date: 2018 Results:    ECG Reviewed: Date: Results:    Cath: Date: Results:   (-) taking anticoagulants/antiplatelets   METS/Exercise Tolerance: 3 - Able to walk 1-2 blocks without stopping Comment: Was a marathon runner until a couple years ago. Now limited due to back and leg pain   Hematologic:  - neg hematologic  ROS  (-) history of blood transfusion   Musculoskeletal: Comment: Ankylosing spondylitis       GI/Hepatic:     (+) GERD,     Renal/Genitourinary:  - neg Renal ROS     Endo:     (+) thyroid problem, hypothyroidism, Obesity (BMI 31),      Psychiatric/Substance Use:     (+) psychiatric history anxiety and depression     Infectious Disease:  - neg infectious disease ROS     Malignancy:       Other: Comment: RA           The complete review of systems is negative other than noted in the HPI or here.    0 lbs 0 oz  Data Unavailable   There is no height or weight on file to calculate BMI.       Physical Exam  Constitutional: Awake, alert, cooperative, no apparent distress, and appears stated age.  Respiratory: non labored breathing   Neuropsychiatric: Calm, cooperative. Normal affect.     Please refer to the physical examination documented by the anesthesiologist in the anesthesia record on the day of surgery    Labs: (personally reviewed)  Component      Latest Ref Rng & Units 3/21/2021   WBC      4.0 - 11.0 10e9/L 6.8   RBC Count      4.4 - 5.9 10e12/L 5.18   Hemoglobin      13.3 - 17.7 g/dL 15.6   Hematocrit      40.0 - 53.0 % 45.7   MCV      78 - 100 fl 88   MCH      26.5 - 33.0 pg 30.1   MCHC      31.5 - 36.5 g/dL 34.1   RDW      10.0 - 15.0 % 13.1   Platelet Count      150 - 450 10e9/L 246   Diff Method       Automated Method   % Neutrophils      % 53.7   % Lymphocytes      % 31.9   % Monocytes      % 8.9   % Eosinophils      % 4.2   % Basophils      % 1.0   % Immature Granulocytes      % 0.3   Nucleated RBCs      0 /100 0   Absolute Neutrophil      1.6 - 8.3 10e9/L 3.7   Absolute Lymphocytes      0.8 - 5.3 10e9/L 2.2   Absolute Monocytes      0.0 - 1.3 10e9/L 0.6   Absolute Eosinophils      0.0 - 0.7 10e9/L 0.3   Absolute Basophils      0.0 - 0.2 10e9/L 0.1   Abs Immature Granulocytes      0 - 0.4 10e9/L 0.0   Absolute Nucleated RBC       0.0   Sodium      133 - 144 mmol/L 141   Potassium      3.4 - 5.3 mmol/L 4.0   Chloride      94 - 109 mmol/L 108   Carbon Dioxide      20 - 32 mmol/L 29   Anion Gap      3 - 14 mmol/L 4   Glucose      70 - 99 mg/dL 95   Urea Nitrogen      7 - 30 mg/dL 12   Creatinine      0.66 - 1.25 mg/dL 0.92   GFR Estimate       >60 mL/min/1.73:m2 >90   GFR Estimate If Black      >60 mL/min/1.73:m2 >90   Calcium      8.5 - 10.1 mg/dL 8.3 (L)   Bilirubin Total      0.2 - 1.3 mg/dL 0.7   Albumin      3.4 - 5.0 g/dL 3.5   Protein Total      6.8 - 8.8 g/dL 7.2   Alkaline Phosphatase      40 - 150 U/L 100   ALT      0 - 70 U/L 34   AST      0 - 45 U/L 20       EKG 2017   SR with SA      Stress test 2018   Interpretation Summary   Normal, low-risk exercise echocardiogram.   The target heart rate was achieved. Normal heart rate and BP response to   exercise.   Normal biventricular size, thickness, and global systolic function at   baseline, LVEF=55-60%.   With exercise, LVEF increased to >70% and LV cavity size decreased   appropriately.   No regional wall motion abnormalities are present at rest or with exercise.   No angina was elicited.   No ECG evidence of ischemia.   Functional capacity is normal for age.   No significant valvular abnormalities are noted on screening Doppler exam.   The aortic root and visualized ascending aorta are normal.       ASSESSMENT and PLAN  Jamison Breen is a 55 year old male scheduled for ESOPHAGOGASTRODUODENOSCOPY, WITH BRAVO PH MONITORING DEVICE INSERTION on 6/3/21 by Dr. Gaspar in treatment of GERD.  PAC referral for risk assessment and optimization for anesthesia with comorbid conditions of hypertension, hypothyroid, GERD, depression and anxiety, ankylosing spondylitis, RA:      Pre-operative considerations:   1.  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 denies cardiac symptoms. Previous cardiac testing above. low risk surgery with 0.4% (RCRI #) risk of major adverse cardiac event.   ~hypertension using atenolol      2.  Pulm: CRISTINE risk: intermediate.   ~nonsmoker   ~denies pulmonary symptoms      3.  GI:  Risk of PONV score = 2.  If > 2, anti-emetic intervention recommended.   ~GERD using Prilosec   ~above  procedure planned       4.  Endo: hypothyroid using liothyronine      5. psych: depression and anxiety using abilify, Prozac and trazodone. Reports symptoms stable.       6. rheum: ankylosing spondylosis and RA. using etanercept and methotrexate. He is due for these tomorrow- recommended he wait until after his procedure tomorrow morning and check with surgeon to ask if he can take tomorrow afternoon       7. Chronic pain- uses suboxone. Can continue tomorrow for this same day procedure      VTE risk: 0.5%     Patient is optimized and is acceptable candidate for the proposed procedure.  No further diagnostic evaluation is needed.     **Physical exam and vital signs not completed today as this visit was scheduled as a virtual visit during Covid 19 pandemic. Physical exam should be completed the DOS in pre-op**    30 minutes were spent completing chart review, seeing the patient, reviewing labs and test results and completing documentation       Olga Camarena PA-C  Preoperative Assessment Center  [facility]  Phone: [phone]  Fax: [phone]

## 2021-06-02 NOTE — ANESTHESIA PREPROCEDURE EVALUATION
"Anesthesia Pre-Procedure Evaluation    Patient: Jailene Breen   MRN: [id number] : 1965        Preoperative Diagnosis: Gastroesophageal reflux disease with esophagitis without hemorrhage [K21.00]  Hiatal hernia [K44.9]  Esophagitis dissecans superficialis [K20.80]   Procedure : Procedure(s):  ESOPHAGOGASTRODUODENOSCOPY, WITH BRAVO PH MONITORING DEVICE INSERTION     Past Medical History:   Diagnosis Date     Ankylosing spondylitis (H) 3/1/2017     Anxiety      Arthritis     back, knees     Back pain     possible drug seeking behavior in the past.      Hypertension      Panic attacks       Past Surgical History:   Procedure Laterality Date     ARTHROSCOPY KNEE Right 2016    Procedure: ARTHROSCOPY KNEE;  Surgeon: Fredo Hughes MD;  Location: [facility] OR     COMBINED ESOPHAGOSCOPY, GASTROSCOPY, DUODENOSCOPY (EGD) WITH CO2 INSUFFLATION N/A 2021    Procedure: ESOPHAGOGASTRODUODENOSCOPY, WITH CO2 INSUFFLATION;  Surgeon: Brandon Mack MD;  Location: [facility] OR     ESOPHAGOSCOPY, GASTROSCOPY, DUODENOSCOPY (EGD), COMBINED N/A 2021    Procedure: Esophagogastroduodenoscopy, With Biopsy;  Surgeon: Brandon Mack MD;  Location: [facility] OR     INJECT PARAVERTEBRAL FACET JOINT LUMBAR / SACRAL FIRST Right 2016    Procedure: INJECT PARAVERTEBRAL FACET JOINT LUMBAR / SACRAL FIRST;  Surgeon: Doretha Magallanes MD;  Location: [facility] OR     ORTHOPEDIC SURGERY      Rt knee X 2     ORTHOPEDIC SURGERY      Lt foot      Allergies   Allergen Reactions     Mirtazapine      Other reaction(s): Coma     Hydrocodone Itching     Ms Contin [Morphine] Itching     Remeron Soltab Other (See Comments)     \"Blacked out\" for one week      Social History     Tobacco Use     Smoking status: Never Smoker     Smokeless tobacco: Former User     Types: Chew     Tobacco comment: Chew   Substance Use Topics     Alcohol use: No     Comment: none      Wt Readings from Last 1 Encounters:   21 117.9 kg (260 lb)    "     Anesthesia Evaluation   Pt has had prior anesthetic. Type: MAC and General.    No history of anesthetic complications       ROS/MED HX  ENT/Pulmonary:  - neg pulmonary ROS  (-) tobacco use   Neurologic:  - neg neurologic ROS     Cardiovascular:     (+) hypertension-----Previous cardiac testing   Echo: Date: 2017 Results:    Stress Test: Date: 2018 Results:    ECG Reviewed: Date: Results:    Cath: Date: Results:   (-) taking anticoagulants/antiplatelets   METS/Exercise Tolerance: 3 - Able to walk 1-2 blocks without stopping Comment: Was a marathon runner until a couple years ago. Now limited due to back and leg pain   Hematologic:  - neg hematologic  ROS  (-) history of blood transfusion   Musculoskeletal: Comment: Ankylosing spondylitis       GI/Hepatic:     (+) GERD,     Renal/Genitourinary:  - neg Renal ROS     Endo:     (+) thyroid problem, hypothyroidism, Obesity (BMI 31),     Psychiatric/Substance Use:     (+) psychiatric history anxiety and depression     Infectious Disease:  - neg infectious disease ROS     Malignancy:       Other: Comment: RA           Physical Exam    Airway        Mallampati: II   TM distance: > 3 FB   Neck ROM: full     Respiratory Devices and Support         Dental  no notable dental history         Cardiovascular   cardiovascular exam normal       Rhythm and rate: regular and normal     Pulmonary   pulmonary exam normal        breath sounds clear to auscultation           OUTSIDE LABS:  CBC:   Lab Results   Component Value Date    WBC 6.8 03/21/2021    WBC 8.3 03/17/2021    HGB 15.6 03/21/2021    HGB 15.0 03/17/2021    HCT 45.7 03/21/2021    HCT 44.1 03/17/2021     03/21/2021     03/17/2021     BMP:   Lab Results   Component Value Date     03/21/2021     03/17/2021    POTASSIUM 4.0 03/21/2021    POTASSIUM 4.1 03/17/2021    CHLORIDE 108 03/21/2021    CHLORIDE 110 (H) 03/17/2021    CO2 29 03/21/2021    CO2 27 03/17/2021    BUN 12 03/21/2021    BUN 12  03/17/2021    CR 0.92 03/21/2021    CR 1.12 03/17/2021    GLC 95 03/21/2021    GLC 92 03/17/2021     COAGS: No results found for: PTT, INR, FIBR  POC: No results found for: BGM, HCG, HCGS  HEPATIC:   Lab Results   Component Value Date    ALBUMIN 3.5 03/21/2021    PROTTOTAL 7.2 03/21/2021    ALT 34 03/21/2021    AST 20 03/21/2021    ALKPHOS 100 03/21/2021    BILITOTAL 0.7 03/21/2021     OTHER:   Lab Results   Component Value Date    SERENE 8.3 (L) 03/21/2021    PHOS 3.3 04/21/2016    TSH 2.39 03/21/2021    CRP <2.9 09/10/2020    SED 12 09/10/2020       Anesthesia Plan    ASA Status:  3      Anesthesia Type: MAC.     - Reason for MAC: chronic cardiopulmonary disease   Induction: Propofol.   Maintenance: TIVA.        Consents    Anesthesia Plan(s) and associated risks, benefits, and realistic alternatives discussed. Questions answered and patient/representative(s) expressed understanding.     - Discussed with:  Patient      - Extended Intubation/Ventilatory Support Discussed: No.      - Patient is DNR/DNI Status: No    Use of blood products discussed: No .     Postoperative Care       PONV prophylaxis: Ondansetron (or other 5HT-3)     Comments:              PAC Discussion and Assessment    ASA Classification: 3  Case is suitable for: [place]  Anesthetic techniques and relevant risks discussed: MAC with GA as backup                  PAC Resident/NP Anesthesia Assessment: Jamison Breen is a 55 year old male scheduled for ESOPHAGOGASTRODUODENOSCOPY, WITH BRAVO PH MONITORING DEVICE INSERTION on 6/3/21 by Dr. Gaspar in treatment of GERD.  PAC referral for risk assessment and optimization for anesthesia with comorbid conditions of hypertension, hypothyroid, GERD, depression and anxiety, ankylosing spondylitis, RA:      Pre-operative considerations:   1.  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 denies cardiac symptoms. Previous  cardiac testing above. low risk surgery with 0.4% (RCRI #) risk of major adverse cardiac event.   ~hypertension using atenolol      2.  Pulm: CRISTINE risk: intermediate.   ~nonsmoker   ~denies pulmonary symptoms      3.  GI:  Risk of PONV score = 2.  If > 2, anti-emetic intervention recommended.   ~GERD using Prilosec   ~above procedure planned       4.  Endo: hypothyroid using liothyronine      5. psych: depression and anxiety using abilify, Prozac and trazodone. Reports symptoms stable.       6. rheum: ankylosing spondylosis and RA. using etanercept and methotrexate. He is due for these tomorrow- recommended he wait until after his procedure tomorrow morning and check with surgeon to ask if he can take tomorrow afternoon       7. Chronic pain- uses suboxone. Can continue tomorrow for this same day procedure      VTE risk: 0.5%     Patient is optimized and is acceptable candidate for the proposed procedure.  No further diagnostic evaluation is needed.     **Physical exam and vital signs not completed today as this visit was scheduled as a virtual visit during Covid 19 pandemic. Physical exam should be completed the DOS in pre-op**       **For further details of assessment, testing, and physical exam please see H and P completed on same date.         HERIBERTO Joseph PA-C

## 2021-06-03 ENCOUNTER — HOSPITAL ENCOUNTER (OUTPATIENT)
Facility: CLINIC | Age: 56
Discharge: HOME | End: 2021-06-03
Attending: INTERNAL MEDICINE | Admitting: INTERNAL MEDICINE
Payer: COMMERCIAL

## 2021-06-03 ENCOUNTER — NURSE TRIAGE (OUTPATIENT)
Dept: NURSING | Facility: CLINIC | Age: 56
End: 2021-06-03

## 2021-06-03 ENCOUNTER — ANESTHESIA (OUTPATIENT)
Dept: GASTROENTEROLOGY | Facility: CLINIC | Age: 56
End: 2021-06-03
Payer: COMMERCIAL

## 2021-06-03 VITALS
WEIGHT: 259.92 LBS | RESPIRATION RATE: 20 BRPM | HEIGHT: 76 IN | DIASTOLIC BLOOD PRESSURE: 54 MMHG | HEART RATE: 45 BPM | TEMPERATURE: 98.4 F | BODY MASS INDEX: 31.65 KG/M2 | SYSTOLIC BLOOD PRESSURE: 111 MMHG | OXYGEN SATURATION: 97 %

## 2021-06-03 DIAGNOSIS — G89.4 CHRONIC PAIN SYNDROME: Primary | ICD-10-CM

## 2021-06-03 LAB — UPPER GI ENDOSCOPY: NORMAL

## 2021-06-03 PROCEDURE — 258N000003 HC RX IP 258 OP 636: Performed by: NURSE ANESTHETIST, CERTIFIED REGISTERED

## 2021-06-03 PROCEDURE — 91035 G-ESOPH REFLX TST W/ELECTROD: CPT | Performed by: INTERNAL MEDICINE

## 2021-06-03 PROCEDURE — 250N000009 HC RX 250: Performed by: NURSE ANESTHETIST, CERTIFIED REGISTERED

## 2021-06-03 PROCEDURE — 370N000017 HC ANESTHESIA TECHNICAL FEE, PER MIN: Performed by: INTERNAL MEDICINE

## 2021-06-03 PROCEDURE — 43235 EGD DIAGNOSTIC BRUSH WASH: CPT | Mod: XS

## 2021-06-03 PROCEDURE — 250N000013 HC RX MED GY IP 250 OP 250 PS 637: Performed by: ANESTHESIOLOGY

## 2021-06-03 PROCEDURE — 250N000011 HC RX IP 250 OP 636: Performed by: NURSE ANESTHETIST, CERTIFIED REGISTERED

## 2021-06-03 RX ORDER — PROPOFOL 10 MG/ML
INJECTION, EMULSION INTRAVENOUS CONTINUOUS PRN
Status: DISCONTINUED | OUTPATIENT
Start: 2021-06-03 | End: 2021-06-03

## 2021-06-03 RX ORDER — ONDANSETRON 2 MG/ML
4 INJECTION INTRAMUSCULAR; INTRAVENOUS
Status: DISCONTINUED | OUTPATIENT
Start: 2021-06-03 | End: 2021-06-03 | Stop reason: HOSPADM

## 2021-06-03 RX ORDER — OXYCODONE HYDROCHLORIDE 10 MG/1
10 TABLET ORAL ONCE
Status: COMPLETED | OUTPATIENT
Start: 2021-06-03 | End: 2021-06-03

## 2021-06-03 RX ORDER — ONDANSETRON 2 MG/ML
4 INJECTION INTRAMUSCULAR; INTRAVENOUS EVERY 30 MIN PRN
Status: DISCONTINUED | OUTPATIENT
Start: 2021-06-03 | End: 2021-06-03 | Stop reason: HOSPADM

## 2021-06-03 RX ORDER — SODIUM CHLORIDE, SODIUM LACTATE, POTASSIUM CHLORIDE, CALCIUM CHLORIDE 600; 310; 30; 20 MG/100ML; MG/100ML; MG/100ML; MG/100ML
INJECTION, SOLUTION INTRAVENOUS CONTINUOUS
Status: DISCONTINUED | OUTPATIENT
Start: 2021-06-03 | End: 2021-06-03 | Stop reason: HOSPADM

## 2021-06-03 RX ORDER — NALOXONE HYDROCHLORIDE 0.4 MG/ML
0.4 INJECTION, SOLUTION INTRAMUSCULAR; INTRAVENOUS; SUBCUTANEOUS
Status: DISCONTINUED | OUTPATIENT
Start: 2021-06-03 | End: 2021-06-03 | Stop reason: HOSPADM

## 2021-06-03 RX ORDER — PROPOFOL 10 MG/ML
INJECTION, EMULSION INTRAVENOUS PRN
Status: DISCONTINUED | OUTPATIENT
Start: 2021-06-03 | End: 2021-06-03

## 2021-06-03 RX ORDER — LIDOCAINE HYDROCHLORIDE 20 MG/ML
SOLUTION OROPHARYNGEAL PRN
Status: DISCONTINUED | OUTPATIENT
Start: 2021-06-03 | End: 2021-06-03

## 2021-06-03 RX ORDER — MEPERIDINE HYDROCHLORIDE 25 MG/ML
12.5 INJECTION INTRAMUSCULAR; INTRAVENOUS; SUBCUTANEOUS
Status: DISCONTINUED | OUTPATIENT
Start: 2021-06-03 | End: 2021-06-03 | Stop reason: HOSPADM

## 2021-06-03 RX ORDER — NALOXONE HYDROCHLORIDE 0.4 MG/ML
0.2 INJECTION, SOLUTION INTRAMUSCULAR; INTRAVENOUS; SUBCUTANEOUS
Status: DISCONTINUED | OUTPATIENT
Start: 2021-06-03 | End: 2021-06-03 | Stop reason: HOSPADM

## 2021-06-03 RX ORDER — LIDOCAINE 40 MG/G
CREAM TOPICAL
Status: DISCONTINUED | OUTPATIENT
Start: 2021-06-03 | End: 2021-06-03 | Stop reason: HOSPADM

## 2021-06-03 RX ORDER — LIDOCAINE HYDROCHLORIDE 20 MG/ML
INJECTION, SOLUTION INFILTRATION; PERINEURAL PRN
Status: DISCONTINUED | OUTPATIENT
Start: 2021-06-03 | End: 2021-06-03

## 2021-06-03 RX ORDER — ONDANSETRON 4 MG/1
4 TABLET, ORALLY DISINTEGRATING ORAL EVERY 6 HOURS PRN
Status: DISCONTINUED | OUTPATIENT
Start: 2021-06-03 | End: 2021-06-03 | Stop reason: HOSPADM

## 2021-06-03 RX ORDER — PROCHLORPERAZINE MALEATE 10 MG
10 TABLET ORAL EVERY 6 HOURS PRN
Status: DISCONTINUED | OUTPATIENT
Start: 2021-06-03 | End: 2021-06-03 | Stop reason: HOSPADM

## 2021-06-03 RX ORDER — FLUMAZENIL 0.1 MG/ML
0.2 INJECTION, SOLUTION INTRAVENOUS
Status: DISCONTINUED | OUTPATIENT
Start: 2021-06-03 | End: 2021-06-03 | Stop reason: HOSPADM

## 2021-06-03 RX ORDER — ONDANSETRON 2 MG/ML
4 INJECTION INTRAMUSCULAR; INTRAVENOUS EVERY 6 HOURS PRN
Status: DISCONTINUED | OUTPATIENT
Start: 2021-06-03 | End: 2021-06-03 | Stop reason: HOSPADM

## 2021-06-03 RX ORDER — SODIUM CHLORIDE 9 MG/ML
INJECTION, SOLUTION INTRAVENOUS CONTINUOUS PRN
Status: DISCONTINUED | OUTPATIENT
Start: 2021-06-03 | End: 2021-06-03

## 2021-06-03 RX ORDER — ONDANSETRON 4 MG/1
4 TABLET, ORALLY DISINTEGRATING ORAL EVERY 30 MIN PRN
Status: DISCONTINUED | OUTPATIENT
Start: 2021-06-03 | End: 2021-06-03 | Stop reason: HOSPADM

## 2021-06-03 RX ADMIN — LIDOCAINE HYDROCHLORIDE 100 MG: 20 INJECTION, SOLUTION INFILTRATION; PERINEURAL at 09:43

## 2021-06-03 RX ADMIN — PROPOFOL 100 MCG/KG/MIN: 10 INJECTION, EMULSION INTRAVENOUS at 09:43

## 2021-06-03 RX ADMIN — SODIUM CHLORIDE: 9 INJECTION, SOLUTION INTRAVENOUS at 09:43

## 2021-06-03 RX ADMIN — LIDOCAINE HYDROCHLORIDE 5 ML: 20 SOLUTION ORAL; TOPICAL at 09:43

## 2021-06-03 RX ADMIN — MIDAZOLAM 2 MG: 1 INJECTION INTRAMUSCULAR; INTRAVENOUS at 09:43

## 2021-06-03 RX ADMIN — PROPOFOL 50 MG: 10 INJECTION, EMULSION INTRAVENOUS at 09:43

## 2021-06-03 RX ADMIN — TOPICAL ANESTHETIC 1 EACH: 200 SPRAY DENTAL; PERIODONTAL at 09:43

## 2021-06-03 RX ADMIN — OXYCODONE HYDROCHLORIDE 10 MG: 10 TABLET ORAL at 10:27

## 2021-06-03 ASSESSMENT — MIFFLIN-ST. JEOR: SCORE: 2125.5

## 2021-06-03 NOTE — OR NURSING
Pt tolerated EGD with BRAVO placement very well under MAC. GE junction was found at 43 cm. Probe was placed at 37 cm. Placement was verified endoscopicaly

## 2021-06-03 NOTE — ANESTHESIA POSTPROCEDURE EVALUATION
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    Anesthesia Type:  MAC    Note:  Disposition: Outpatient   Postop Pain Control: Uneventful            Sign Out: Well controlled pain   PONV: No   Neuro/Psych: Uneventful            Sign Out: Acceptable/Baseline neuro status   Airway/Respiratory: Uneventful            Sign Out: Acceptable/Baseline resp. status   CV/Hemodynamics: Uneventful            Sign Out: Acceptable CV status; No obvious hypovolemia; No obvious fluid overload   Other NRE: NONE   DID A NON-ROUTINE EVENT OCCUR? No           Last vitals:  Vitals:    06/03/21 0705 06/03/21 1006 06/03/21 1012   BP: 117/76 101/52 111/54   Pulse: 55 (!) 49 (!) 45   Resp: 18 20 20   Temp: 36.9  C (98.4  F)     SpO2: 94% 97% 97%       Last vitals prior to Anesthesia Care Transfer:  CRNA VITALS  6/3/2021 0930 - 6/3/2021 1016      6/3/2021             NIBP:  94/62    Pulse:  50    NIBP Mean:  73          Electronically Signed By: Hanane Dailey MD  Radha 3, 2021  10:16 AM

## 2021-06-03 NOTE — TELEPHONE ENCOUNTER
Patient only looking for the location of his procedure today.     Looks to be being done at [place]    Patient states that he knows that address.     No triage needed.       Reason for Disposition    General information question, no triage required and triager able to answer question    Protocols used: INFORMATION ONLY CALL - NO TRIAGE-A-    Ashlyn Van RN on 6/3/2021 at 6:10 AM

## 2021-06-07 ENCOUNTER — HOSPITAL ENCOUNTER (OUTPATIENT)
Dept: BEHAVIORAL HEALTH | Facility: CLINIC | Age: 56
End: 2021-06-07
Attending: PSYCHIATRY & NEUROLOGY
Payer: COMMERCIAL

## 2021-06-07 PROCEDURE — 90853 GROUP PSYCHOTHERAPY: CPT | Mod: GT | Performed by: PSYCHOLOGIST

## 2021-06-07 NOTE — GROUP NOTE
Process Group Note  Telemedicine Visit: The patient's condition can be safely assessed and treated via synchronous audio and visual telemedicine encounter.    Reason for Telemedicine Visit: Patient has requested telehealth visit  Originating Site (Patient Location): Patient's home  Distant Site (Provider Location): [facility] Outpatient Setting: Adult Day Tx  Consent:  The patient/guardian has verbally consented to: the potential risks and benefits of telemedicine (video visit) versus in person care; bill my insurance or make self-payment for services provided; and responsibility for payment of non-covered services.   Mode of Communication:  Video Conference via [employer]  As the provider I attest to compliance with applicable laws and regulations related to telemedicine.    PATIENT'S NAME: Jailene Breen  MRN:   [id number]  :   1965  ACCT. NUMBER: [id number]  DATE OF SERVICE: 21  START TIME:  9:00 AM  END TIME:  9:50 AM  FACILITATOR: Josefina Beauchamp PsyD  TOPIC: MH Process Group    Diagnoses:    Major Depressive Disorder, recurrent, moderate  JEFF  Panic Disorder    Rheumatoid Arthritis    Fredo Hughes MD MD Orthopedics 2016 End  21   Phone: [phone]; Fax: [phone]      Aiden Resendez PA  Assigned PCP  2020   Namita Sharma MD Resident Student in organized health care education/training program 2020 End  20   Phone: [phone]; Fax: [phone]      Barbie Christie, PhD LP Psychologist Licensed Mental Health 2020 End  20   Phone: [phone]; Fax: [phone]      Comment: supervising      Susana Pike APRN CNP Referring Physician Nurse Practitioner 2020 End  20   Phone: [phone]; Fax: [phone]      Raghu Parada MD  Assigned Rheumatology Provider  10/23/2020   10/28/20            Chanelle Solo MD MD Psychiatry 2021 End  21   Phone: [phone]; Fax:  [phone]      Comment: Attending      Coffin, Richard Breyen, MD Resident Psychiatry 02/24/2021 End  2/24/21   Phone: [phone]; Fax: [phone]            [facility] Adult Mental Health Day Treatment  TRACK: 2A    NUMBER OF PARTICIPANTS: 6          Data:    Session content: At the start of this group, patients were invited to check in by identifying themselves, describing their current emotional status, and identifying issues to address in this group.   Area(s) of treatment focus addressed in this session included Symptom Management, Personal Safety and Community Resources/Discharge Planning.  Client reported being safe today.  Reported mood is depressed.    Goal for today is to attend therapy.  The client talked to the group about how he had medical procedures done and felt a lot of pain. He reported that he was depressed and the group talked to him about something pleasurable that he could do today and he decided to get a shake from [employer]      Therapeutic Interventions/Treatment Strategies:  Psychotherapist offered support, feedback and validation and reinforced use of skills. Treatment modalities used include Cognitive Behavioral Therapy and Dialectical Behavioral Therapy. Interventions include Cognitive Restructuring:  Facilitated recognition of the connection between negative thoughts and negative core beliefs, Coping Skills: Assisted patient in identifying 1-2 healthy distraction skills to reduce overall distress and Mindfulness: Facilitated discussion of when/how to use mindfulness skills to benefit general health, mental health symptoms, and stressors.    Assessment:    Patient response:   Patient responded to session by giving feedback, listening and being attentive    Possible barriers to participation / learning include: severity of symptoms    Health Issues:   Yes: Pain, Associated Psychological Distress       Substance Use Review:   Substance Use: No active concerns  identified.    Mental Status/Behavioral Observations  Appearance:   Appropriate   Eye Contact:   Good   Psychomotor Behavior: Normal   Attitude:   Cooperative   Orientation:   All  Speech   Rate / Production: Normal    Volume:  Normal   Mood:    Depressed  Sad   Affect:    Constricted   Thought Content:   Rumination  Thought Form:  Coherent  Logical     Insight:    Good     Plan:     Safety Plan: Recommended that patient call 911 or go to the local ED should there be a change in any of these risk factors.     Barriers to treatment: None identified    Patient Contracts (see media tab):  None    Substance Use: Provided encouragement towards sobriety     Continue or Discharge: Patient will continue in Adult Day Treatment (ADT)  as planned. Patient is likely to benefit from learning and using skills as they work toward the goals identified in their treatment plan.      Josefina Beauchamp PsyD  June 7, 2021  Elpidio Blake D,  Licensed Clinical Psychologist

## 2021-06-07 NOTE — GROUP NOTE
Psychotherapy Group Note  Telemedicine Visit: The patient's condition can be safely assessed and treated via synchronous audio and visual telemedicine encounter.    Reason for Telemedicine Visit: Patient has requested telehealth visit  Originating Site (Patient Location): Patient's home  Distant Site (Provider Location): [facility] Outpatient Setting: Adult Day Tx  Consent:  The patient/guardian has verbally consented to: the potential risks and benefits of telemedicine (video visit) versus in person care; bill my insurance or make self-payment for services provided; and responsibility for payment of non-covered services.   Mode of Communication:  Video Conference via [employer]  As the provider I attest to compliance with applicable laws and regulations related to telemedicine.    PATIENT'S NAME: Jailene Breen  MRN:   [id number]  :   1965  ACCT. NUMBER: [id number]  DATE OF SERVICE: 21  START TIME: 10:00 AM  END TIME: 10:50 AM  FACILITATOR: Josefina Beauchamp PsyD  TOPIC:  EBP Group: Self-Awareness  [facility] Adult Mental Health Day Treatment  TRACK: 2A      NUMBER OF PARTICIPANTS: 6    Summary of Group / Topics Discussed:  Self-Awareness: Self-Compassion: Patients received overview of key concepts in developing self-compassion. Patients discussed mindfulness, self-kindness, and finding common humanity. Patients identified their current approach to problems in their lives and learned skills for increasing self-compassion. Patients identified ways they can put self-compassion skills into practice and problem solve barriers to application of skills.     Patient Session Goals / Objectives:    [place] components of self-compassion    Identify ways to practice self-compassion in daily life    Problem solve barriers to self-compassion practice      Patient Participation / Response:  Fully participated with the group by sharing personal reflections / insights and openly received / provided  feedback with other participants.    Identified / Expressed readiness to act intentionally, increase self-compassion, promote personal growth    Treatment Plan:  Patient has an initial individualized treatment plan that was created as part of their diagnostic assessment / admission process.  A master individualized treatment plan is in the process of being developed with the patient and multi-disciplinary care team.    Alisia Woodward Psy., D,  Licensed Clinical Psychologist

## 2021-06-08 ENCOUNTER — HOSPITAL ENCOUNTER (OUTPATIENT)
Dept: BEHAVIORAL HEALTH | Facility: CLINIC | Age: 56
End: 2021-06-08
Attending: PSYCHIATRY & NEUROLOGY
Payer: COMMERCIAL

## 2021-06-08 PROCEDURE — 90853 GROUP PSYCHOTHERAPY: CPT | Mod: 95 | Performed by: PSYCHOLOGIST

## 2021-06-08 PROCEDURE — 90853 GROUP PSYCHOTHERAPY: CPT | Mod: GT | Performed by: SOCIAL WORKER

## 2021-06-08 PROCEDURE — 90853 GROUP PSYCHOTHERAPY: CPT | Mod: GT | Performed by: PSYCHOLOGIST

## 2021-06-08 NOTE — GROUP NOTE
Psychotherapy Group Note  Telemedicine Visit: The patient's condition can be safely assessed and treated via synchronous audio and visual telemedicine encounter.    Reason for Telemedicine Visit: Patient has requested telehealth visit  Originating Site (Patient Location): Patient's home  Distant Site (Provider Location): [facility] Outpatient Setting: Adult Day Tx  Consent:  The patient/guardian has verbally consented to: the potential risks and benefits of telemedicine (video visit) versus in person care; bill my insurance or make self-payment for services provided; and responsibility for payment of non-covered services.   Mode of Communication:  Video Conference via [email or web address]  As the provider I attest to compliance with applicable laws and regulations related to telemedicine.    PATIENT'S NAME: Jailene Breen  MRN:   [id number]  :   1965  ACCT. NUMBER: [id number]  DATE OF SERVICE: 21  START TIME: 10:00 AM  END TIME: 10:50 AM  FACILITATOR: Josefina Beauchamp PsyD  TOPIC:  EBP Group: Self-Awareness  [facility] Adult Mental Health Day Treatment  TRACK: 2A    NUMBER OF PARTICIPANTS: 6    Summary of Group / Topics Discussed:  Self-Awareness: Self-Compassion: Patients received overview of key concepts in developing self-compassion. Patients discussed mindfulness, self-kindness, and finding common humanity. Patients identified their current approach to problems in their lives and learned skills for increasing self-compassion. Patients identified ways they can put self-compassion skills into practice and problem solve barriers to application of skills.     Patient Session Goals / Objectives:    [place] components of self-compassion    Identify ways to practice self-compassion in daily life    Problem solve barriers to self-compassion practice      Patient Participation / Response:  Fully participated with the group by sharing personal reflections / insights and openly received / provided  feedback with other participants.    Demonstrated understanding of values, strengths, and challenges to learn about themselves    Treatment Plan:  Patient has a current master individualized treatment plan.  See Epic treatment plan for more information.    Alisia Woodward Psy., D,  Licensed Clinical Psychologist

## 2021-06-08 NOTE — GROUP NOTE
Psychotherapy Group Note    PATIENT'S NAME: Jailene Breen  MRN:   [id number]  :   1965  ACCT. NUMBER: [id number]  DATE OF SERVICE: 21  START TIME: 11:00 AM  END TIME: 11:50 AM  FACILITATOR: Liliya Oh LICSW  TOPIC: MH EBP Group: Coping Skills  [facility] Adult Mental Health Day Treatment  TRACK: 2A    NUMBER OF PARTICIPANTS: 6    Summary of Group / Topics Discussed:  Coping Skills: Additional Coping Skills:  Patients discussed and practiced skills to manage worry.  Reviewed the benefits of applying the aforementioned coping strategies.  Patients explored how these strategies might be applied to daily stressors or distressing situations.    Patient Session Goals / Objectives:    Understand the purpose and benefits of applying worry management coping strategies    Identify preferred strategies to try.    Assess what skills client is already using.     Address barriers to utilizing coping skills when in distress.  Telemedicine Visit: The patient's condition can be safely assessed and treated via synchronous audio and visual telemedicine encounter.      Reason for Telemedicine Visit: Services only offered telehealth    Originating Site (Patient Location): Patient's home    Distant Site (Provider Location): Provider Remote Setting    Consent:  The patient/guardian has verbally consented to: the potential risks and benefits of telemedicine (video visit) versus in person care; bill my insurance or make self-payment for services provided; and responsibility for payment of non-covered services.     Mode of Communication:  Video Conference via [employer]    As the provider I attest to compliance with applicable laws and regulations related to telemedicine.         Patient Participation / Response:  Moderately participated, sharing some personal reflections / insights and adequately adequately received / provided feedback with other participants.    Identified barriers to applying coping  skills    Treatment Plan:  Patient has a current master individualized treatment plan.  See Epic treatment plan for more information.    Liliya Oh, [facility] HospitalSW

## 2021-06-08 NOTE — GROUP NOTE
Process Group Note  Telemedicine Visit: The patient's condition can be safely assessed and treated via synchronous audio and visual telemedicine encounter.    Reason for Telemedicine Visit: Patient has requested telehealth visit  Originating Site (Patient Location): Patient's home  Distant Site (Provider Location): [facility] Outpatient Setting: Adult Day Tx  Consent:  The patient/guardian has verbally consented to: the potential risks and benefits of telemedicine (video visit) versus in person care; bill my insurance or make self-payment for services provided; and responsibility for payment of non-covered services.   Mode of Communication:  Video Conference via [employer]  As the provider I attest to compliance with applicable laws and regulations related to telemedicine.    PATIENT'S NAME: Jailene Breen  MRN:   [id number]  :   1965  ACCT. NUMBER: [id number]  DATE OF SERVICE: 21  START TIME:  9:00 AM  END TIME:  9:50 AM  FACILITATOR: Josefina Beauchamp PsyD  TOPIC: MH Process Group    Diagnoses:    Major Depressive Disorder, recurrent, moderate  JEFF  Panic Disorder    Rheumatoid Arthritis    Fredo Hughes MD MD Orthopedics 2016 End  21   Phone: [phone]; Fax: [phone]      Aiden Resendez PA  Assigned PCP  2020   Namita Sharma MD Resident Student in organized health care education/training program 2020 End  20   Phone: [phone]; Fax: [phone]      Barbie Christie, PhD LP Psychologist Licensed Mental Health 2020 End  20   Phone: [phone]; Fax: [phone]      Comment: supervising      Susana Pike APRN CNP Referring Physician Nurse Practitioner 2020 End  20   Phone: [phone]; Fax: [phone]      Raghu Parada MD  Assigned Rheumatology Provider  10/23/2020   10/28/20            Chanelle Solo MD MD Psychiatry 2021 End  21   Phone: [phone]; Fax:  [phone]      Comment: Attending      Coffin, Richard Breyen, MD Resident Psychiatry 02/24/2021 End  2/24/21   Phone: [phone]; Fax: [phone]            [facility] Adult Mental Health Day Treatment  TRACK: 2A    NUMBER OF PARTICIPANTS: 7          Data:    Session content: At the start of this group, patients were invited to check in by identifying themselves, describing their current emotional status, and identifying issues to address in this group.   Area(s) of treatment focus addressed in this session included Symptom Management, Personal Safety and Community Resources/Discharge Planning.  Client reported being safe today.  Reported mood is depressed.     Goal for today is to attend therapy groups.  The client talked to the group about how he had low energy and low motivation, and fell asleep during yesterday's group, and then lost connection to the group. He reported that he watned to improve his sleep schedule. He stated that he did get a blueberry shake from [employer] and talked to the group about his dogs that cheer him.  He reported that he will have a stomach operation in a few weeks and hopes it helps him with the hiatal hernia, as he has chronic pain and can't take a deep breath.       Therapeutic Interventions/Treatment Strategies:  Psychotherapist offered support, feedback and validation and reinforced use of skills. Treatment modalities used include Cognitive Behavioral Therapy and Dialectical Behavioral Therapy. Interventions include Cognitive Restructuring:  Assisted patient in formulating new neutral/positive alternatives to challenge less helpful / ineffective thoughts, Coping Skills: Discussed meditation skills and addressed ways to implement meditation skills  and Mindfulness: Encouraged a plan to use mindfulness skills in daily life.    Assessment:    Patient response:   Patient responded to session by giving feedback, listening and being attentive    Possible barriers to  participation / learning include: severity of symptoms    Health Issues:   Yes: Pain, Associated Psychological Distress       Substance Use Review:   Substance Use: No active concerns identified.    Mental Status/Behavioral Observations  Appearance:   Appropriate   Eye Contact:   Good   Psychomotor Behavior: Normal   Attitude:   Cooperative   Orientation:   All  Speech   Rate / Production: Normal    Volume:  Normal   Mood:    depressed  Affect:    Constricted   Thought Content:   Rumination  Thought Form:  Coherent  Logical     Insight:    Good     Plan:     Safety Plan: Recommended that patient call 911 or go to the local ED should there be a change in any of these risk factors.     Barriers to treatment: None identified    Patient Contracts (see media tab):  None    Substance Use: Provided encouragement towards sobriety     Continue or Discharge: Patient will continue in Adult Day Treatment (ADT)  as planned. Patient is likely to benefit from learning and using skills as they work toward the goals identified in their treatment plan.      Josefina Beauchamp PsyD  June 8, 2021  Elpidio Blake, TIRSO,  Licensed Clinical Psychologist

## 2021-06-10 ENCOUNTER — HOSPITAL ENCOUNTER (OUTPATIENT)
Dept: BEHAVIORAL HEALTH | Facility: CLINIC | Age: 56
End: 2021-06-10
Attending: PSYCHIATRY & NEUROLOGY
Payer: COMMERCIAL

## 2021-06-10 ENCOUNTER — MYC MEDICAL ADVICE (OUTPATIENT)
Dept: PSYCHIATRY | Facility: CLINIC | Age: 56
End: 2021-06-10

## 2021-06-10 PROCEDURE — 90853 GROUP PSYCHOTHERAPY: CPT | Mod: GT | Performed by: PSYCHOLOGIST

## 2021-06-10 NOTE — TELEPHONE ENCOUNTER
Called patient to respond to [employer] message.  Asked patient to come in to be evaluated at [facility].  He was agreeable to coming to hospital but his wife does not get home from work until 6-7 and so he was going to try to call his  for a ride.    Routed to provider.

## 2021-06-10 NOTE — TELEPHONE ENCOUNTER
Called to check in with patient and offer to call 911 for transportation.  Patient again declined transport by ambulance.  He called his  who is recovering from gall bladder surgery and therefore can not bring him.  He called his wife who will bring him after work.  He will continue to find another ride.    NASREEN routed to provider.

## 2021-06-10 NOTE — TELEPHONE ENCOUNTER
FYI - Status Update  Who is Calling: [facility]  Update: We are a new pharmacy for patient.  Please send 90 day supply of each medication.  Okay to leave a detailed message?:  No return call needed     Patient will call back to schedule right lumbar facet joint steroid injection at L3-4, L4-5 .          Yasemin MILLAN    [facility]

## 2021-06-10 NOTE — PROGRESS NOTES
Acknowledgement of Current Treatment Plan       I have reviewed my treatment plan with my therapist / counselor on 6/10/2021  .   I agree with the plan as it is written in the electronic health record. (2A)    Name:      Signature:  Jailene Breen Unable to sign due to COVID19       Dr Saul Quesada MD  Psychiatrist    Josefina Beauchamp Psy.D,  Licensed Psychologist   Elpidio Blake, D,  Licensed Clinical Psychologist

## 2021-06-10 NOTE — GROUP NOTE
Psychotherapy Group Note  Telemedicine Visit: The patient's condition can be safely assessed and treated via synchronous audio and visual telemedicine encounter.    Reason for Telemedicine Visit: Patient has requested telehealth visit  Originating Site (Patient Location): Patient's home  Distant Site (Provider Location): [facility] Outpatient Setting: Adult Day Tx  Consent:  The patient/guardian has verbally consented to: the potential risks and benefits of telemedicine (video visit) versus in person care; bill my insurance or make self-payment for services provided; and responsibility for payment of non-covered services.   Mode of Communication:  Video Conference via [employer]  As the provider I attest to compliance with applicable laws and regulations related to telemedicine.    PATIENT'S NAME: Jailene Breen  MRN:   [id number]  :   1965  ACCT. NUMBER: [id number]  DATE OF SERVICE: 6/10/21  START TIME: 10:00 AM  END TIME: 10:50 AM  FACILITATOR: Josefina Beauchamp PsyD  TOPIC:  EBP Group: Self-Awareness  [facility] Adult Mental Health Day Treatment  TRACK: 2A    NUMBER OF PARTICIPANTS: 7    Summary of Group / Topics Discussed:  Self-Awareness: Personal Strengths: Topic focused on assisting patients in identifying personal strengths and how they relate to the management of mental health symptoms. Patients discussed the benefits of acknowledging their personal strengths and their impact on mood improvement, mindfulness, and perspective. Patients worked to increase time spent on recognition and appreciation of what is positive and working in their lives. The goal is to reduce rumination and negative thinking resulting in increased mindfulness and resilience. Patients will work to put skills into practice and problem-solve barriers.     Patient Session Goals / Objectives:    Identified personal strengths    Identified barriers to recognition of personal strengths    Verbalized understanding of  strategies to increase use of their strengths in management of daily symptoms      Patient Participation / Response:  Fully participated with the group by sharing personal reflections / insights and openly received / provided feedback with other participants.    Identified / Expressed readiness to act intentionally, increase self-compassion, promote personal growth    Treatment Plan:  Patient has a current master individualized treatment plan.  See Epic treatment plan for more information.    Alisia Woodward Psy., D,  Licensed Clinical Psychologist

## 2021-06-10 NOTE — ADDENDUM NOTE
Encounter addended by: Josefina Beauchamp PsyD on: 6/10/2021 12:08 PM   Actions taken: Flowsheet accepted, Clinical Note Signed

## 2021-06-11 NOTE — TELEPHONE ENCOUNTER
Thank you for following up on this.  Sounds like an appropriate plan for Les, as his wife should be at home with him over the weekend as well and in the past has been good about parents with her.  Thank you for following up with him today!  Slim

## 2021-06-11 NOTE — PROGRESS NOTES
Patient Active Problem List   Diagnosis     CARDIOVASCULAR SCREENING; LDL GOAL LESS THAN 160     Anxiety     Overweight (BMI 25.0-29.9)     Back pain     Tear meniscus knee, right, initial encounter     Rheumatoid arthritis involving multiple sites, unspecified rheumatoid factor presence     Chronic pain     Right-sided thoracic back pain, unspecified chronicity     JEFF (generalized anxiety disorder)     Panic attack     Syncope, unspecified syncope type     Ankylosing spondylitis (H)     Tobacco use disorder     Moderate major depression (H)     Immunocompromised (H)     Essential hypertension     Suicidal ideation     Insomnia due to other mental disorder     Suicide ideation     Depression, unspecified depression type     Major depressive disorder, recurrent episode, severe with mixed features (H)       Current Outpatient Medications:      acetaminophen (TYLENOL) 500 MG tablet, Take 1,000 mg by mouth every 6 hours as needed for mild pain (headache), Disp: , Rfl:      ARIPiprazole (ABILIFY) 5 MG tablet, Take 1 tablet (5 mg) by mouth daily, Disp: 30 tablet, Rfl: 1     atenolol (TENORMIN) 25 MG tablet, Take 1 tablet (25 mg) by mouth daily, Disp: 90 tablet, Rfl: 1     buprenorphine HCl-naloxone HCl (SUBOXONE) 8-2 MG per film, Use 0.5 film under the tongue 4 times per day for pain, approximately every 6 hours. Max 2 films per day. Fill 6/1/21 and begin 6/3/2021.  30 day supply, Disp: 60 each, Rfl: 0     chlorzoxazone (PARAFON FORTE) 500 MG tablet, Take 1 tablet (500 mg) by mouth 3 times daily as needed for muscle spasms, Disp: 45 tablet, Rfl: 1     clonazePAM (KLONOPIN) 1 MG tablet, TAKE ONE-HALF - 1 TABLET BY MOUTH ONCE DAILY AS NEEDED FOR ANXIETY, Disp: 30 tablet, Rfl: 0     etanercept (ENBREL SURECLICK) 50 MG/ML autoinjector, Inject 50 mg Subcutaneous once a week, Disp: 1 mL, Rfl: 3     fish oil-omega-3 fatty acids 1000 MG capsule, Take 1 g by mouth daily, Disp: , Rfl:      FLUoxetine (PROZAC) 40 MG capsule, Take 1  capsule (40 mg) by mouth daily, Disp: 30 capsule, Rfl: 1     folic acid (FOLVITE) 1 MG tablet, Take 5 tablets (5 mg) by mouth daily, Disp: 150 tablet, Rfl: 3     liothyronine (CYTOMEL) 25 MCG tablet, Take 1 tablet (25 mcg) by mouth daily, Disp: 30 tablet, Rfl: 3     medical cannabis (Patient's own supply), See Admin Instructions (The purpose of this order is to document that the patient reports taking medical cannabis.  This is not a prescription, and is not used to certify that the patient has a qualifying medical condition.)   Pills PRN  Lozenges PRN, Disp: , Rfl:      menthol, Topical Analgesic, 2.5% (BENGAY VANISHIN SCENT) 2.5 % GEL topical gel, Apply topically every 6 hours as needed for moderate pain, Disp: 45 g, Rfl: 3     methotrexate sodium 2.5 MG TABS, Take 4 tablets (10 mg) by mouth every 7 days (Patient taking differently: Take 4 tablets by mouth every 7 days Every Thursday), Disp: 48 tablet, Rfl: 0     multivitamin (CENTRUM SILVER) tablet, Take 1 tablet by mouth daily, Disp: , Rfl:      nicotine polacrilex (NICORETTE) 4 MG gum, PLACE 1 PIECE INSIDE THE CHEEK AS NEEDED FOR SMOKING CESSATION, Disp: 200 each, Rfl: 3     omeprazole (PRILOSEC) 20 MG DR capsule, Take 20 mg by mouth daily as needed , Disp: , Rfl:      prazosin (MINIPRESS) 1 MG capsule, Take 1 capsule (1 mg) by mouth At Bedtime, Disp: 30 capsule, Rfl: 1     traZODone (DESYREL) 50 MG tablet, Take 1 tablet (50 mg) by mouth At Bedtime, Disp: 30 tablet, Rfl: 1     vitamin D2 (ERGOCALCIFEROL) 27049 units (1250 mcg) capsule, Take 50,000 Units by mouth twice a week mon and thurs, Disp: , Rfl:   Psychiatry staffing: case discussed  Diagnosis:  As above;  Plus alcohol in remission.  Dealing with depressive symptoms

## 2021-06-11 NOTE — TELEPHONE ENCOUNTER
Correspondence with patient via [employer]. Avoiding going to ED for now as he doesn't want to spend the entire weekend there. Verbalizes understanding to go to ED should acute safety concerns arise.

## 2021-06-11 NOTE — TELEPHONE ENCOUNTER
Attempted to call patient for check-in. No answer. LVM requesting call back or outreach via [employer].

## 2021-06-11 NOTE — TELEPHONE ENCOUNTER
Patient does not appear to have made it into the hospital last evening.  Called to follow-up and check in, no answer, LVM requesting a call back with updates.    Called wife as well but there was no answer.

## 2021-06-13 ENCOUNTER — NURSE TRIAGE (OUTPATIENT)
Dept: NURSING | Facility: CLINIC | Age: 56
End: 2021-06-13

## 2021-06-13 NOTE — TELEPHONE ENCOUNTER
Had endoscopy done, bravo unit in esophagus. Can I return it to any [facility] location or should it go back to [place], where I got it? I told him to bring it back to the [facility] location. He understands.  Fanny Stoll RN  [facility]    Reason for Disposition    General information question, no triage required and triager able to answer question    Additional Information    Negative: [1] Caller is not with the adult (patient) AND [2] reporting urgent symptoms    Negative: Lab result questions    Negative: Medication questions    Negative: Caller can't be reached by phone    Negative: Caller has already spoken to PCP or another triager    Negative: RN needs further essential information from caller in order to complete triage    Negative: Requesting regular office appointment    Negative: [1] Caller requesting NON-URGENT health information AND [2] PCP's office is the best resource    Negative: Health Information question, no triage required and triager able to answer question    Protocols used: INFORMATION ONLY CALL - NO TRIAGE-A-

## 2021-06-15 ENCOUNTER — TELEPHONE (OUTPATIENT)
Dept: BEHAVIORAL HEALTH | Facility: CLINIC | Age: 56
End: 2021-06-15

## 2021-06-15 ENCOUNTER — TELEPHONE (OUTPATIENT)
Dept: PSYCHIATRY | Facility: CLINIC | Age: 56
End: 2021-06-15

## 2021-06-15 ENCOUNTER — DOCUMENTATION ONLY (OUTPATIENT)
Dept: PSYCHIATRY | Facility: CLINIC | Age: 56
End: 2021-06-15

## 2021-06-15 ENCOUNTER — VIRTUAL VISIT (OUTPATIENT)
Dept: PSYCHIATRY | Facility: CLINIC | Age: 56
End: 2021-06-15
Attending: PSYCHIATRY & NEUROLOGY
Payer: COMMERCIAL

## 2021-06-15 ENCOUNTER — HOSPITAL ENCOUNTER (INPATIENT)
Facility: CLINIC | Age: 56
LOS: 6 days | Discharge: HOME | End: 2021-06-21
Attending: EMERGENCY MEDICINE | Admitting: PSYCHIATRY & NEUROLOGY
Payer: COMMERCIAL

## 2021-06-15 DIAGNOSIS — R45.851 SUICIDAL IDEATION: Primary | ICD-10-CM

## 2021-06-15 DIAGNOSIS — R45.851 SUICIDAL IDEATION: ICD-10-CM

## 2021-06-15 DIAGNOSIS — F41.1 GAD (GENERALIZED ANXIETY DISORDER): ICD-10-CM

## 2021-06-15 DIAGNOSIS — F99 INSOMNIA DUE TO OTHER MENTAL DISORDER: ICD-10-CM

## 2021-06-15 DIAGNOSIS — F33.2 MAJOR DEPRESSIVE DISORDER, RECURRENT EPISODE, SEVERE WITH MIXED FEATURES (H): ICD-10-CM

## 2021-06-15 DIAGNOSIS — Z11.52 ENCOUNTER FOR SCREENING LABORATORY TESTING FOR SEVERE ACUTE RESPIRATORY SYNDROME CORONAVIRUS 2 (SARS-COV-2): ICD-10-CM

## 2021-06-15 DIAGNOSIS — F32.9 MAJOR DEPRESSIVE DISORDER, SINGLE EPISODE, UNSPECIFIED: ICD-10-CM

## 2021-06-15 DIAGNOSIS — F33.1 MAJOR DEPRESSIVE DISORDER, RECURRENT EPISODE, MODERATE (H): ICD-10-CM

## 2021-06-15 DIAGNOSIS — F51.05 INSOMNIA DUE TO OTHER MENTAL DISORDER: ICD-10-CM

## 2021-06-15 DIAGNOSIS — F32.A DEPRESSION, UNSPECIFIED DEPRESSION TYPE: ICD-10-CM

## 2021-06-15 DIAGNOSIS — F41.9 ANXIETY: Primary | ICD-10-CM

## 2021-06-15 LAB
AMPHETAMINES UR QL SCN: NEGATIVE
BARBITURATES UR QL: NEGATIVE
BENZODIAZ UR QL: NEGATIVE
CANNABINOIDS UR QL SCN: POSITIVE
COCAINE UR QL: NEGATIVE
ETHANOL UR QL SCN: NEGATIVE
LABORATORY COMMENT REPORT: NORMAL
OPIATES UR QL SCN: NEGATIVE
SARS-COV-2 RNA RESP QL NAA+PROBE: NEGATIVE
SPECIMEN SOURCE: NORMAL

## 2021-06-15 PROCEDURE — 124N000002 HC R&B MH UMMC

## 2021-06-15 PROCEDURE — 250N000013 HC RX MED GY IP 250 OP 250 PS 637: Performed by: STUDENT IN AN ORGANIZED HEALTH CARE EDUCATION/TRAINING PROGRAM

## 2021-06-15 PROCEDURE — 93005 ELECTROCARDIOGRAM TRACING: CPT

## 2021-06-15 PROCEDURE — 99285 EMERGENCY DEPT VISIT HI MDM: CPT | Mod: 25

## 2021-06-15 PROCEDURE — 80320 DRUG SCREEN QUANTALCOHOLS: CPT | Performed by: EMERGENCY MEDICINE

## 2021-06-15 PROCEDURE — C9803 HOPD COVID-19 SPEC COLLECT: HCPCS

## 2021-06-15 PROCEDURE — 99215 OFFICE O/P EST HI 40 MIN: CPT | Mod: 95 | Performed by: STUDENT IN AN ORGANIZED HEALTH CARE EDUCATION/TRAINING PROGRAM

## 2021-06-15 PROCEDURE — 87635 SARS-COV-2 COVID-19 AMP PRB: CPT | Performed by: EMERGENCY MEDICINE

## 2021-06-15 PROCEDURE — 250N000013 HC RX MED GY IP 250 OP 250 PS 637: Performed by: EMERGENCY MEDICINE

## 2021-06-15 PROCEDURE — 99285 EMERGENCY DEPT VISIT HI MDM: CPT | Performed by: EMERGENCY MEDICINE

## 2021-06-15 PROCEDURE — 93010 ELECTROCARDIOGRAM REPORT: CPT | Performed by: INTERNAL MEDICINE

## 2021-06-15 PROCEDURE — 80307 DRUG TEST PRSMV CHEM ANLYZR: CPT | Performed by: EMERGENCY MEDICINE

## 2021-06-15 RX ORDER — TRAZODONE HYDROCHLORIDE 50 MG/1
50 TABLET, FILM COATED ORAL
Status: DISCONTINUED | OUTPATIENT
Start: 2021-06-15 | End: 2021-06-18

## 2021-06-15 RX ORDER — MULTIPLE VITAMINS W/ MINERALS TAB 9MG-400MCG
1 TAB ORAL DAILY
Status: DISCONTINUED | OUTPATIENT
Start: 2021-06-16 | End: 2021-06-21 | Stop reason: HOSPADM

## 2021-06-15 RX ORDER — IBUPROFEN 600 MG/1
600 TABLET, FILM COATED ORAL ONCE
Status: DISCONTINUED | OUTPATIENT
Start: 2021-06-15 | End: 2021-06-21 | Stop reason: HOSPADM

## 2021-06-15 RX ORDER — LIOTHYRONINE SODIUM 25 UG/1
25 TABLET ORAL DAILY
Status: DISCONTINUED | OUTPATIENT
Start: 2021-06-16 | End: 2021-06-21 | Stop reason: HOSPADM

## 2021-06-15 RX ORDER — FLUOXETINE 40 MG/1
40 CAPSULE ORAL DAILY
Qty: 30 CAPSULE | Refills: 1 | Status: CANCELLED | OUTPATIENT
Start: 2021-06-15

## 2021-06-15 RX ORDER — OLANZAPINE 10 MG/1
10 TABLET ORAL 3 TIMES DAILY PRN
Status: DISCONTINUED | OUTPATIENT
Start: 2021-06-15 | End: 2021-06-21 | Stop reason: HOSPADM

## 2021-06-15 RX ORDER — ARIPIPRAZOLE 5 MG/1
5 TABLET ORAL DAILY
Status: DISCONTINUED | OUTPATIENT
Start: 2021-06-16 | End: 2021-06-18

## 2021-06-15 RX ORDER — ERGOCALCIFEROL 1.25 MG/1
50000 CAPSULE, LIQUID FILLED ORAL
Status: DISCONTINUED | OUTPATIENT
Start: 2021-06-17 | End: 2021-06-21 | Stop reason: HOSPADM

## 2021-06-15 RX ORDER — TRAZODONE HYDROCHLORIDE 50 MG/1
50 TABLET, FILM COATED ORAL AT BEDTIME
Qty: 30 TABLET | Refills: 1 | Status: CANCELLED | OUTPATIENT
Start: 2021-06-15

## 2021-06-15 RX ORDER — PRAZOSIN HYDROCHLORIDE 1 MG/1
1 CAPSULE ORAL AT BEDTIME
Qty: 30 CAPSULE | Refills: 1 | Status: CANCELLED | OUTPATIENT
Start: 2021-06-15

## 2021-06-15 RX ORDER — BUPRENORPHINE AND NALOXONE 8; 2 MG/1; MG/1
1 FILM, SOLUBLE BUCCAL; SUBLINGUAL 2 TIMES DAILY
Status: DISCONTINUED | OUTPATIENT
Start: 2021-06-15 | End: 2021-06-21 | Stop reason: HOSPADM

## 2021-06-15 RX ORDER — HYDROXYZINE HYDROCHLORIDE 25 MG/1
25 TABLET, FILM COATED ORAL EVERY 4 HOURS PRN
Status: DISCONTINUED | OUTPATIENT
Start: 2021-06-15 | End: 2021-06-21 | Stop reason: HOSPADM

## 2021-06-15 RX ORDER — ARIPIPRAZOLE 5 MG/1
5 TABLET ORAL DAILY
Qty: 30 TABLET | Refills: 1 | Status: CANCELLED | OUTPATIENT
Start: 2021-06-15

## 2021-06-15 RX ORDER — CLONAZEPAM 0.5 MG/1
0.5 TABLET ORAL DAILY PRN
Status: DISCONTINUED | OUTPATIENT
Start: 2021-06-15 | End: 2021-06-16

## 2021-06-15 RX ORDER — PRAZOSIN HYDROCHLORIDE 1 MG/1
1 CAPSULE ORAL AT BEDTIME
Status: DISCONTINUED | OUTPATIENT
Start: 2021-06-15 | End: 2021-06-18

## 2021-06-15 RX ORDER — ATENOLOL 25 MG/1
25 TABLET ORAL DAILY
Status: DISCONTINUED | OUTPATIENT
Start: 2021-06-16 | End: 2021-06-21 | Stop reason: HOSPADM

## 2021-06-15 RX ORDER — OLANZAPINE 10 MG/2ML
10 INJECTION, POWDER, FOR SOLUTION INTRAMUSCULAR 3 TIMES DAILY PRN
Status: DISCONTINUED | OUTPATIENT
Start: 2021-06-15 | End: 2021-06-21 | Stop reason: HOSPADM

## 2021-06-15 RX ORDER — ACETAMINOPHEN 325 MG/1
650 TABLET ORAL EVERY 4 HOURS PRN
Status: DISCONTINUED | OUTPATIENT
Start: 2021-06-15 | End: 2021-06-21 | Stop reason: HOSPADM

## 2021-06-15 RX ORDER — LIOTHYRONINE SODIUM 25 UG/1
25 TABLET ORAL DAILY
Qty: 30 TABLET | Refills: 3 | Status: CANCELLED | OUTPATIENT
Start: 2021-06-15

## 2021-06-15 RX ORDER — FOLIC ACID 1 MG/1
5 TABLET ORAL DAILY
Status: DISCONTINUED | OUTPATIENT
Start: 2021-06-16 | End: 2021-06-21 | Stop reason: HOSPADM

## 2021-06-15 RX ADMIN — BUPRENORPHINE AND NALOXONE 1 FILM: 8; 2 FILM, SOLUBLE BUCCAL; SUBLINGUAL at 20:14

## 2021-06-15 RX ADMIN — ACETAMINOPHEN 650 MG: 325 TABLET, FILM COATED ORAL at 12:11

## 2021-06-15 RX ADMIN — NICOTINE POLACRILEX 2 MG: 2 GUM, CHEWING BUCCAL at 19:05

## 2021-06-15 ASSESSMENT — ENCOUNTER SYMPTOMS
COLOR CHANGE: 0
ARTHRALGIAS: 0
FEVER: 0
EYE REDNESS: 0
ABDOMINAL PAIN: 0
SHORTNESS OF BREATH: 0
CONFUSION: 0
NECK STIFFNESS: 0
DYSPHORIC MOOD: 1
SORE THROAT: 0
HEADACHES: 0
DIFFICULTY URINATING: 0

## 2021-06-15 ASSESSMENT — ACTIVITIES OF DAILY LIVING (ADL)
LAUNDRY: WITH SUPERVISION
WALKING_OR_CLIMBING_STAIRS_DIFFICULTY: NO
WHICH_OF_THE_ABOVE_FUNCTIONAL_RISKS_HAD_A_RECENT_ONSET_OR_CHANGE?: AMBULATION
DIFFICULTY_EATING/SWALLOWING: NO
TOILETING_ISSUES: NO
CONCENTRATING,_REMEMBERING_OR_MAKING_DECISIONS_DIFFICULTY: NO
WEAR_GLASSES_OR_BLIND: YES
VISION_MANAGEMENT: BIFOCAL
EQUIPMENT_CURRENTLY_USED_AT_HOME: CANE, STRAIGHT
HYGIENE/GROOMING: INDEPENDENT
DOING_ERRANDS_INDEPENDENTLY_DIFFICULTY: NO
DRESSING/BATHING_DIFFICULTY: NO
ORAL_HYGIENE: INDEPENDENT
DRESS: INDEPENDENT
DIFFICULTY_COMMUNICATING: NO
FALL_HISTORY_WITHIN_LAST_SIX_MONTHS: NO

## 2021-06-15 ASSESSMENT — MIFFLIN-ST. JEOR: SCORE: 2148.53

## 2021-06-15 NOTE — ED NOTES
"ED to Behavioral Floor Handoff    SITUATION  Jailene Breen is a 55 year old male who speaks English and lives in a home with others The patient arrived in the ED by private car from home with a complaint of Suicidal  .The patient's current symptoms started/worsened 2 month(s) ago and during this time the symptoms have increased.   In the ED, pt was diagnosed with   Final diagnoses:   Suicidal ideation        Initial vitals were: BP: 105/67  Pulse: 54  Temp: 97.6  F (36.4  C)  Resp: 16  Height: 193 cm (6' 4\")  Weight: 120.2 kg (265 lb)  SpO2: 95 %   --------  Is the patient diabetic? No   If yes, last blood glucose? --     If yes, was this treated in the ED? --  --------  Is the patient inebriated (ETOH) No or Impaired on other substances? No  MSSA done? N/A  Last MSSA score: --    Were withdrawal symptoms treated? N/A  Does the patient have a seizure history? No. If yes, date of most recent seizure--  --------  Is the patient patient experiencing suicidal ideation? reports occasional suicidal thoughts representing feeling that life is not worth feeling    Homicidal ideation? denies current or recent homicidal ideation or behaviors.    Self-injurious behavior/urges? denies current or recent self injurious behavior or ideation.  ------  Was pt aggressive in the ED No  Was a code called No  Is the pt now cooperative? Yes  -------  Meds given in ED:   Medications   acetaminophen (TYLENOL) tablet 650 mg (650 mg Oral Given 6/15/21 1211)      Family present during ED course? No  Family currently present? No    BACKGROUND  Does the patient have a cognitive impairment or developmental disability? No  Allergies:   Allergies   Allergen Reactions     Mirtazapine      Other reaction(s): Coma     Hydrocodone Itching     Ms Contin [Morphine] Itching     Remeron Soltab Other (See Comments)     \"Blacked out\" for one week   .   Social demographics are   Social History     Socioeconomic History     Marital status:      " "Spouse name: None     Number of children: None     Years of education: None     Highest education level: None   Occupational History     None   Social Needs     Financial resource strain: None     Food insecurity     Worry: None     Inability: None     Transportation needs     Medical: None     Non-medical: None   Tobacco Use     Smoking status: Never Smoker     Smokeless tobacco: Former User     Types: Chew     Tobacco comment: Chew   Substance and Sexual Activity     Alcohol use: No     Comment: none     Drug use: Yes     Frequency: 7.0 times per week     Types: Marijuana     Sexual activity: Yes     Partners: Female     Comment: decreased with Prozac   Lifestyle     Physical activity     Days per week: None     Minutes per session: None     Stress: None   Relationships     Social connections     Talks on phone: None     Gets together: None     Attends Jew service: None     Active member of club or organization: None     Attends meetings of clubs or organizations: None     Relationship status: None     Intimate partner violence     Fear of current or ex partner: None     Emotionally abused: None     Physically abused: None     Forced sexual activity: None   Other Topics Concern     Parent/sibling w/ CABG, MI or angioplasty before 65F 55M? Not Asked   Social History Narrative     None        ASSESSMENT  Labs results   Labs Ordered and Resulted from Time of ED Arrival Up to the Time of Departure from the ED   DRUG ABUSE SCREEN 6 CHEM DEP URINE ([facility]) - Abnormal; Notable for the following components:       Result Value    Cannabinoids Qual Urine Positive (*)     All other components within normal limits   SARS-COV-2 (COVID-19) VIRUS RT-PCR      Imaging Studies: No results found for this or any previous visit (from the past 24 hour(s)).   Most recent vital signs /67   Pulse 54   Temp 97.6  F (36.4  C) (Oral)   Resp 16   Ht 1.93 m (6' 4\")   Wt 120.2 kg (265 lb)   SpO2 95%   BMI 32.26 kg/m   "   Abnormal labs/tests/findings requiring intervention:---   Pain control: pt had none  Nausea control: pt had none    RECOMMENDATION  Are any infection precautions needed (MRSA, VRE, etc.)? No If yes, what infection? --  ---  Does the patient have mobility issues? independently. If yes, what device does the pt use? ---  ---  Is patient on 72 hour hold or commitment? No If on 72 hour hold, have hold and rights been given to patient? No  Are admitting orders written if after 10 p.m. ?N/A  Tasks needing to be completed:---     Kesha Tomlinson, RN   [facility]--    [phone] [place] [facility]   [phone] [facility] ED

## 2021-06-15 NOTE — TELEPHONE ENCOUNTER
On Radha 15, 2021, at 7:52 AM, writer called patient at mobile to confirm Virtual Visit. Writer unable to make contact with patient. Writer left detailed voice message for call back. [phone] left as call back number. Saul Kan, EMT

## 2021-06-15 NOTE — ED NOTES
Patient continuing to complain of pain, MD notified and ordered ibuprofen, patient refused the medication. Patient was given warm pack and MD notified.

## 2021-06-15 NOTE — ED PROVIDER NOTES
"    [facility] EMERGENCY DEPARTMENT ([facility])   Radha 15, 2021 ED 16B 10:58 AM   History     Chief Complaint   Patient presents with     Suicidal     The history is provided by the patient and medical records.     Jailene Breen is a 55 year old male with history of ankylosing spondylitis, chronic pain, and recent EGD on 6/3/2021 who was sent by [facility] psychiatry for admission for suicidal ideation. Patient has had depression for years but this worsened after losing his job in 2017.  His depression has become worse, and has been suicidal with a plan for the past week with plan to fall on a table saw. He states [facility]iat[facility] wanted him to come in last week but couldn't get a ride here. He states it's been a tough weekend until he could get in.  He has had one prior suicide attempt 5-6 years ago when he tried to hang himself. He endorses helplessness, hopelessness, worthlessness. He has not been able to sleep, instead takes cat naps that don't do much. He states his appetite has been decreased, eating less and has lost some weight as a result. He weighed 275 lbs but now weighs around 265 lbs. His depression is worsened by his chronic pain secondary to his ankylosing spondylitis. Has chronic neck back knee pain, arthritis. He is always in pain in spite of being on 8 mg Suboxone twice a day.  and medical cannabis. He states the cannabis helps his mood but doesn't help his pain, and Suboxone \"doesn't do shit\" either. Pain is worst in low back and right side. No history of COVID-19, got vaccinated and had negative COVID-19 test earlier this month for his procedure. No rhinorrhea, sore throat, body aches, fevers, chills, nausea, vomiting, diarrhea, no loss of ense of taste or smell. Patient has a bed waiting for him.     Wt Readings from Last 10 Encounters:   06/15/21 120.2 kg (265 lb)   06/03/21 117.9 kg (259 lb 14.8 oz)   05/26/21 117.9 kg (260 lb)   03/25/21 124.7 kg (275 lb)   03/18/21 123.7 kg (272 lb 12.8 oz) "   03/16/21 122.5 kg (270 lb)   01/18/21 120.2 kg (265 lb)   01/16/21 123 kg (271 lb 2 oz)   06/13/20 120.7 kg (266 lb)   01/21/20 125.2 kg (276 lb)         PAST MEDICAL HISTORY:   Past Medical History:   Diagnosis Date     Ankylosing spondylitis (H) 3/1/2017     Anxiety      Arthritis     back, knees     Back pain     possible drug seeking behavior in the past.      Gastroesophageal reflux disease      Hypertension      Panic attacks        PAST SURGICAL HISTORY:   Past Surgical History:   Procedure Laterality Date     ARTHROSCOPY KNEE Right 9/22/2016    Procedure: ARTHROSCOPY KNEE;  Surgeon: Fredo Hughes MD;  Location: [facility] OR     COMBINED ESOPHAGOSCOPY, GASTROSCOPY, DUODENOSCOPY (EGD) WITH CO2 INSUFFLATION N/A 1/19/2021    Procedure: ESOPHAGOGASTRODUODENOSCOPY, WITH CO2 INSUFFLATION;  Surgeon: Brandon Mack MD;  Location: [facility] OR     ESOPHAGOSCOPY, GASTROSCOPY, DUODENOSCOPY (EGD), COMBINED N/A 1/19/2021    Procedure: Esophagogastroduodenoscopy, With Biopsy;  Surgeon: Brandon Mack MD;  Location: [facility] OR     INJECT PARAVERTEBRAL FACET JOINT LUMBAR / SACRAL FIRST Right 11/25/2016    Procedure: INJECT PARAVERTEBRAL FACET JOINT LUMBAR / SACRAL FIRST;  Surgeon: Doretha Magallanes MD;  Location: [facility] OR     ORTHOPEDIC SURGERY      Rt knee X 2     ORTHOPEDIC SURGERY      Lt foot       Past medical history, past surgical history, medications, and allergies were reviewed with the patient. Additional pertinent items: None    FAMILY HISTORY:   Family History   Problem Relation Age of Onset     Autoimmune Disease No family hx of      Anesthesia Reaction No family hx of      Deep Vein Thrombosis (DVT) No family hx of        SOCIAL HISTORY:   Social History     Tobacco Use     Smoking status: Never Smoker     Smokeless tobacco: Former User     Types: Chew     Tobacco comment: Chew   Substance Use Topics     Alcohol use: No     Comment: none     Social history was reviewed with the patient. Additional  pertinent items: None      Patient's Medications   New Prescriptions    No medications on file   Previous Medications    ACETAMINOPHEN (TYLENOL) 500 MG TABLET    Take 1,000 mg by mouth every 6 hours as needed for mild pain (headache)    ARIPIPRAZOLE (ABILIFY) 5 MG TABLET    Take 1 tablet (5 mg) by mouth daily    ATENOLOL (TENORMIN) 25 MG TABLET    Take 1 tablet (25 mg) by mouth daily    BUPRENORPHINE HCL-NALOXONE HCL (SUBOXONE) 8-2 MG PER FILM    Use 0.5 film under the tongue 4 times per day for pain, approximately every 6 hours. Max 2 films per day. Fill 6/1/21 and begin 6/3/2021.  30 day supply    CHLORZOXAZONE (PARAFON FORTE) 500 MG TABLET    Take 1 tablet (500 mg) by mouth 3 times daily as needed for muscle spasms    CLONAZEPAM (KLONOPIN) 1 MG TABLET    TAKE ONE-HALF - 1 TABLET BY MOUTH ONCE DAILY AS NEEDED FOR ANXIETY    ETANERCEPT (ENBREL SURECLICK) 50 MG/ML AUTOINJECTOR    Inject 50 mg Subcutaneous once a week    FISH OIL-OMEGA-3 FATTY ACIDS 1000 MG CAPSULE    Take 1 g by mouth daily    FLUOXETINE (PROZAC) 40 MG CAPSULE    Take 1 capsule (40 mg) by mouth daily    FOLIC ACID (FOLVITE) 1 MG TABLET    Take 5 tablets (5 mg) by mouth daily    LIOTHYRONINE (CYTOMEL) 25 MCG TABLET    Take 1 tablet (25 mcg) by mouth daily    MEDICAL CANNABIS (PATIENT'S OWN SUPPLY)    See Admin Instructions (The purpose of this order is to document that the patient reports taking medical cannabis.  This is not a prescription, and is not used to certify that the patient has a qualifying medical condition.)     Pills PRN   Lozenges PRN    METHOTREXATE SODIUM 2.5 MG TABS    Take 4 tablets (10 mg) by mouth every 7 days    MULTIVITAMIN (CENTRUM SILVER) TABLET    Take 1 tablet by mouth daily    NICOTINE POLACRILEX (NICORETTE) 4 MG GUM    PLACE 1 PIECE INSIDE THE CHEEK AS NEEDED FOR SMOKING CESSATION    OMEPRAZOLE (PRILOSEC) 20 MG DR CAPSULE    Take 20 mg by mouth daily as needed     PRAZOSIN (MINIPRESS) 1 MG CAPSULE    Take 1 capsule (1 mg)  "by mouth At Bedtime    TRAZODONE (DESYREL) 50 MG TABLET    Take 1 tablet (50 mg) by mouth At Bedtime    VITAMIN D2 (ERGOCALCIFEROL) 31483 UNITS (1250 MCG) CAPSULE    Take 50,000 Units by mouth twice a week mon and thurs   Modified Medications    No medications on file   Discontinued Medications    MENTHOL, TOPICAL ANALGESIC, 2.5% (BENGAY VANISHIN SCENT) 2.5 % GEL TOPICAL GEL    Apply topically every 6 hours as needed for moderate pain          Allergies   Allergen Reactions     Mirtazapine      Other reaction(s): Coma     Hydrocodone Itching     Ms Contin [Morphine] Itching     Remeron Soltab Other (See Comments)     \"Blacked out\" for one week        Review of Systems   Constitutional: Negative for fever.   HENT: Negative.  Negative for congestion and sore throat.    Eyes: Negative for redness.   Respiratory: Negative for shortness of breath.    Cardiovascular: Negative for chest pain.   Gastrointestinal: Negative for abdominal pain.   Genitourinary: Negative for difficulty urinating.   Musculoskeletal: Negative for arthralgias and neck stiffness.   Skin: Negative for color change.   Neurological: Negative for headaches.   Psychiatric/Behavioral: Positive for dysphoric mood and suicidal ideas. Negative for confusion.     A complete review of systems was performed with pertinent positives and negatives noted in the HPI, and all other systems negative.    Physical Exam   BP: 105/67  Pulse: 54  Temp: 97.6  F (36.4  C)  Resp: 16  Height: 193 cm (6' 4\")  Weight: 120.2 kg (265 lb)  SpO2: 95 %      Physical Exam  Constitutional:       General: He is not in acute distress.     Appearance: He is not diaphoretic.   HENT:      Head: Atraumatic.   Eyes:      General: No scleral icterus.     Pupils: Pupils are equal, round, and reactive to light.   Cardiovascular:      Heart sounds: Normal heart sounds.   Pulmonary:      Effort: No respiratory distress.      Breath sounds: Normal breath sounds.   Abdominal:      General: Bowel " sounds are normal.      Palpations: Abdomen is soft.      Tenderness: There is no abdominal tenderness.   Musculoskeletal:         General: No tenderness.   Skin:     General: Skin is warm.      Findings: No rash.   Psychiatric:         Mood and Affect: Mood is depressed.         Thought Content: Thought content includes suicidal ideation. Thought content includes suicidal plan.         ED Course        Procedures           The medical record was reviewed and interpreted.  Current labs reviewed and interpreted.  Previous labs reviewed and interpreted.                 Results for orders placed or performed during the hospital encounter of 06/15/21 (from the past 24 hour(s))   Drug abuse screen 6 urine (chem dep)   Result Value Ref Range    Amphetamine Qual Urine Negative NEG^Negative    Barbiturates Qual Urine Negative NEG^Negative    Benzodiazepine Qual Urine Negative NEG^Negative    Cannabinoids Qual Urine Positive (A) NEG^Negative    Cocaine Qual Urine Negative NEG^Negative    Ethanol Qual Urine Negative NEG^Negative    Opiates Qualitative Urine Negative NEG^Negative   Asymptomatic SARS-CoV-2 COVID-19 Virus (Coronavirus) by PCR    Specimen: Nasopharyngeal   Result Value Ref Range    SARS-CoV-2 Virus Specimen Source Nasopharyngeal     SARS-CoV-2 PCR Result NEGATIVE     SARS-CoV-2 PCR Comment (Note)      Medications   acetaminophen (TYLENOL) tablet 650 mg (650 mg Oral Given 6/15/21 1211)             Assessments & Plan (with Medical Decision Making)   55-year-old male who presents to the emergency room with worsening depression.  Differential includes substance abuse, major depressive disorder, other mental illness, medication side effect, withdrawal.  Exam reveals patient to be depressed, suicidal with plan.  The case was discussed with behavioral  and the patient will be directly admitted to psychiatry for further evaluation and management.    I have reviewed the nursing notes.    I have reviewed the  findings, diagnosis, plan and need for follow up with the patient.    New Prescriptions    No medications on file       Final diagnoses:   Suicidal ideation     I, Aparna Browning, am serving as a trained medical scribe to document services personally performed by Fabrizio Lara MD based on the provider's statements to me on Radha 15, 2021.  This document has been checked and approved by the attending provider.    IFabrizio MD, was physically present and have reviewed and verified the accuracy of this note documented by Aparna Browning, medical scribe.       FABRIZIO LARA MD     6/15/2021   [facility] EMERGENCY DEPARTMENT     Fabrizio Lara MD  06/15/21 0692

## 2021-06-15 NOTE — PHARMACY-ADMISSION MEDICATION HISTORY
"  Admission Medication History Completed by Pharmacy    See [employer] Admission Navigator for allergy information, preferred outpatient pharmacy and prior to admission medications.     Medication History Sources:     Prescription fill history via Epic Surescripts data    6/2 PAC clinic pharmD completed medication history    Patient (via iPad in ED)     Changes made to PTA medication list (reason):    Added: None    Deleted: Bengay 2.5% gel: Apply topically Q6H PRN moderate pain (patient states not helpful and requested med removal since not using)    Changed: None    Additional Information:    Trazodone and prazosin: Patient states he often forgets to take HS meds. Last doses marked \"more than a month\" per pt report     Suboxone: Patient preference for one film twice daily for pain (vs. 0.5 film four times daily)     Medical cannabis indication: pain     [place] :  1) suboxone 8-2 film, qty #60 (30 days) last filled 6-2-2021  2) clonazepam 1mg tablets, qty #30 (30 days) last filled 5-    Prior to Admission medications    Medication Sig Last Dose     acetaminophen (TYLENOL) 500 MG tablet Take 1,000 mg by mouth every 6 hours as needed for mild pain (headache)     Past Week     ARIPiprazole (ABILIFY) 5 MG tablet Take 1 tablet (5 mg) by mouth daily 6/15/2021         atenolol (TENORMIN) 25 MG tablet Take 1 tablet (25 mg) by mouth daily 6/15/2021         buprenorphine HCl-naloxone HCl (SUBOXONE) 8-2 MG per film Use 0.5 film under the tongue 4 times per day for pain, approximately every 6 hours. Max 2 films per day. Fill 6/1/21 and begin 6/3/2021.  30 day supply     6/15/2021 at x1     chlorzoxazone (PARAFON FORTE) 500 MG tablet Take 1 tablet (500 mg) by mouth 3 times daily as needed for muscle spasms     6/14/2021     clonazePAM (KLONOPIN) 1 MG tablet TAKE ONE-HALF - 1 TABLET BY MOUTH ONCE DAILY AS NEEDED FOR ANXIETY     6/15/2021     etanercept (ENBREL SURECLICK) 50 MG/ML autoinjector     Inject 50 mg Subcutaneous once a " week on Thursdays 6/10/2021     fish oil-omega-3 fatty acids 1000 MG capsule     Take 1 g by mouth daily 6/15/2021     FLUoxetine (PROZAC) 40 MG capsule Take 1 capsule (40 mg) by mouth daily 6/15/2021         folic acid (FOLVITE) 1 MG tablet Take 5 tablets (5 mg) by mouth daily 6/15/2021         liothyronine (CYTOMEL) 25 MCG tablet Take 1 tablet (25 mcg) by mouth daily 6/15/2021         medical cannabis (Patient's own supply) See Admin Instructions (The purpose of this order is to document that the patient reports taking medical cannabis.  This is not a prescription, and is not used to certify that the patient has a qualifying medical condition.)     Pills PRN   Lozenges PRN     6/14/2021     methotrexate sodium 2.5 MG TABS Take 4 tablets (10 mg) by mouth every 7 days  Patient taking differently: Take 4 tablets by mouth every 7 days Every Thursday     6/10/2021     multivitamin (CENTRUM SILVER) tablet Take 1 tablet by mouth daily 6/15/2021         nicotine polacrilex (NICORETTE) 4 MG gum PLACE 1 PIECE INSIDE THE CHEEK AS NEEDED FOR SMOKING CESSATION     6/15/2021     vitamin D2 (ERGOCALCIFEROL) 22124 units (1250 mcg) capsule     Take 50,000 Units by mouth twice a week mon and thurs 6/14/2021     omeprazole (PRILOSEC) 20 MG DR capsule Take 20 mg by mouth daily as needed      More than a month     prazosin (MINIPRESS) 1 MG capsule Take 1 capsule (1 mg) by mouth At Bedtime More than a month         traZODone (DESYREL) 50 MG tablet Take 1 tablet (50 mg) by mouth At Bedtime More than a month           Date completed: 06/15/21    Medication history completed by:   Katerina Tinsley, Pharm.D., [facility]  Behavioral Health Inpatient Pharmacist  [facility] Emergency Department  Phone: *[phone] ([facility]) or [phone]

## 2021-06-15 NOTE — TELEPHONE ENCOUNTER
Medical clearance and then psychiatric bed is reserved.  Spoke to wife and relayed the information provided by Dr. Downing.

## 2021-06-15 NOTE — ED TRIAGE NOTES
"Patient presents voluntarily to the ED from home with complaints of suicidal ideation. Patient was on the phone with his therapist and expressed his SI, the therapist recommended him to come in and called in on his behalf to let staff know he would be coming in. Patient has plan to drive his car into a tree or lay on a table saw, he says these thoughts have been persistent for \"a couple months.\" Denies HI, denies hallucinations.   "

## 2021-06-15 NOTE — PROGRESS NOTES
"VIDEO VISIT  Jailene Breen is a 55 year old patient who is being evaluated via a billable video visit.      The patient has been notified of following:   \"We have found that certain health care needs can be provided without the need for an in-person physical exam. This service lets us provide the care you need with a video conversation. If a prescription is necessary we can send it directly to your pharmacy. If lab work is needed we can place an order for that and you can then stop by our lab to have the test done at a later time. Insurers are generally covering virtual visits as they would in-office visits so billing should not be different than normal.  If for some reason you do get billed incorrectly, you should contact the billing office to correct it and that number is in the AVS .    Patient has given verbal consent for video visit?: Yes   How would you like to obtain your AVS?: [employer]iva  AVS SmartPhrase [PsychAVS] has been placed in 'Patient Instructions': Yes      Video- Visit Details  Type of service:  video visit for medication management  Time of service:    Date:  06/15/2021    Video Start Time:  7:53 AM        Video End Time:  8:45 AM    Reason for video visit:  Patient unable to travel due to Covid-19  Originating Site (patient location):  [facility]   Location- Patient's home  Distant Site (provider location):  Remote location  Mode of Communication:  Video Conference via [employer]  Consent:  Patient has given verbal consent for video visit?: Yes        [facility]  Psychiatry Clinic  PSYCHIATRY PROGRESS NOTE     CARE TEAM:    PCP- Aiden Resendez   Rheumatology- Raghu Parada MD  [facility]- Susana GRANT; Santa Farrell PsyD    Jailene Breen is a 55 year old patient who prefers the name Les and uses pronouns he, him, his.     PERTINENT BACKGROUND                                 [most recent eval 09/21/20]     Psych " "pertinent item history includes suicidal ideation, SIB [cutting], mutiple psychotropic trials , severe med reaction, psych hosp (<3), SUBSTANCE USE: alcohol, substance use treatment  and Major Medical Problems (Rheumatoid Arthritis and Ankylosing Spondylitis)    INTERIM HISTORY        The patient reports good treatment adherence.  History was provided by the patient who was a good historian.     Since the last visit:   - Patient reached out to clinic with [employer] message on 6/10/21 expressing, \"I've been getting very little sleep due to my medication not helping with my pain and been having a lot of suicidal ideation. I'm getting so tired of dealing with this pain and just don't want to live with it anymore, this is total bs that my pain clinic would change my medication when I clearly needed to get my mental health back on track. Because now the pain is ruling my life and will eventually end it.\" Was encouraged to go to ED, could not find a ride, did not want 911 called, and planned to present with wife after her work, however did not go to ED. Upon follow-up patient reported feeling safe at home and would present to ED if symptoms worsened.  - Today, \"not doing too bad this morning.\"  - \"It comes and goes when I have those feelings. Just kind of knocks me on my butt.\"  - \"Was really close to going in the other day, but I called a friend and talked to him for like an hour or two and he said he'd follow-up with me every couple hours.\"  - Sometimes, \"I just get these feelings like 'can I make it through another day?'\"  - \"I just feel worthless and now I feel like a burden.\" \"I just want to feel loved and needed.\" \"I used to feel like so much more and now I feel like nothing.\"  - \"Would make me feel a lot better if I got my disability and could contribute in that way.\"  - Identified reasons for living as \"family, dogs, kids\".  - Worse memory, difficulty recalling where and when he had scheduled therapy intake with, as " "well as other appointments.  - Feel guilty, \"because I just keep adding more medical expenses on more.\"  - Some decrease in appetite and weight loss this spring.  - suicidal ideation has been \"intrusive\" and \"spontaneous\".  He does have some concern that if it was bad enough or \"if I was feeling really bad at the moment\" he may act on it.  -\"I keep getting ideas\", \"using a table saw. I had it on and thought, I could fall down on it and make it look like and accident.\" Didn't because thought about \"who would find me and how that would affect them.\"  - \"Last night tempted to run car into tree, but I decided to go home instead.\"  - Has a couple suicidal thoughts today, \"walking around with a cane today\" thinking the point was relatively sharp and could stab himself with it.  Ultimately decided not to because he was concerned about the pain.  \"I want it to be painless.\"  - Right now \"not sure if home is a safe space\" because \"I'm home alone, which is why I keep flipping back and forth.\"    - Continues to site pain as a significant factor contributing to his mental health.  He believes inpatient pain consult would be helpful. \"I just wish they would put me on the meds I was on before. They were much more helpful. I understand why but that (overdose) wasn't even a thought on my mind.\"    -While staffing patient called his wife, she's on her way home from work to give him a ride to [place] ED for planned voluntary admission.    RECENT PSYCH ROS:   Depression: As above   Elevated:  none  Psychosis:  none  Anxiety:  as above  Trauma Related:  none  Dysregulation:  none  Eating Disorder: no  Other: no    Adverse Effects:  denies  Pertinent Negative Symptoms: No violent ideation, aggression, psychosis, hallucinations, delusions, otto and hypomania    Recent Substance Use:     Alcohol- none since 2004, used to attend AA meetings prior to COVID.  Tobacco- denies  Caffeine- yes  Cannabis- denies     Opioids- as prescribed      " "     Narcan Kit- prescribed   Other illicit drugs- none    FAMILY and SOCIAL HISTORY                                      [per patient report]            Family Hx:  Mom - depression (since  when sister )    Social Hx:  Financial/ Work- currently attending college at [employer], is hoping to start school at [employer] for a degree in psychology after being unable to work due to diagnosis of rheumatoid arthritis and ankylosing spondylitis  Partner/ -  in .  Children- 23 and 25 year old kids      Living situation- [place], house with wife and 2 kids      Social/ Spiritual Support- Talks to  every two weeks for lunch, but \"doesn't really have friends\". Family is supportive.      PSYCH and SUBSTANCE USE Critical Summary Points since  - started escitalopram  November - increased escitalopram 20mg daily  February - stopped hydroxyzine; started trazodone 50mg at bedtime and 25mg daily as needed for anxiety   - Patient hospitalized started on Abilify, titrated to 5mg daily; escitalopram switched to fluoxetine 40mg daily; started prazosin 1mg at bedtime. Pain management switched oxycodone to Suboxone and started medical marijuana.  May - patient still taking hydroxyzine, discontinued today.    MEDICAL HISTORY  and ALLERGY     ALLERGIES: Mirtazapine, Hydrocodone, Ms contin [morphine], and Remeron soltab     Patient Active Problem List   Diagnosis     CARDIOVASCULAR SCREENING; LDL GOAL LESS THAN 160     Anxiety     Overweight (BMI 25.0-29.9)     Back pain     Tear meniscus knee, right, initial encounter     Rheumatoid arthritis involving multiple sites, unspecified rheumatoid factor presence     Chronic pain     Right-sided thoracic back pain, unspecified chronicity     JEFF (generalized anxiety disorder)     Panic attack     Syncope, unspecified syncope type     Ankylosing spondylitis (H)     Tobacco use disorder     Moderate major depression (H)     " Immunocompromised (H)     Essential hypertension     Suicidal ideation     Insomnia due to other mental disorder     Suicide ideation     Depression, unspecified depression type     Major depressive disorder, recurrent episode, severe with mixed features (H)         MEDICAL REVIEW OF SYSTEMS     Contraception- none  A comprehensive review of systems was performed and is negative other than noted in the HPI.  MEDICATIONS          Current Outpatient Medications   Medication Sig Dispense Refill     acetaminophen (TYLENOL) 500 MG tablet Take 1,000 mg by mouth every 6 hours as needed for mild pain (headache)       ARIPiprazole (ABILIFY) 5 MG tablet Take 1 tablet (5 mg) by mouth daily 30 tablet 1     atenolol (TENORMIN) 25 MG tablet Take 1 tablet (25 mg) by mouth daily 90 tablet 1     buprenorphine HCl-naloxone HCl (SUBOXONE) 8-2 MG per film Use 0.5 film under the tongue 4 times per day for pain, approximately every 6 hours. Max 2 films per day. Fill 6/1/21 and begin 6/3/2021.  30 day supply 60 each 0     chlorzoxazone (PARAFON FORTE) 500 MG tablet Take 1 tablet (500 mg) by mouth 3 times daily as needed for muscle spasms 45 tablet 1     clonazePAM (KLONOPIN) 1 MG tablet TAKE ONE-HALF - 1 TABLET BY MOUTH ONCE DAILY AS NEEDED FOR ANXIETY 30 tablet 0     etanercept (ENBREL SURECLICK) 50 MG/ML autoinjector Inject 50 mg Subcutaneous once a week 1 mL 3     fish oil-omega-3 fatty acids 1000 MG capsule Take 1 g by mouth daily       FLUoxetine (PROZAC) 40 MG capsule Take 1 capsule (40 mg) by mouth daily 30 capsule 1     folic acid (FOLVITE) 1 MG tablet Take 5 tablets (5 mg) by mouth daily 150 tablet 3     liothyronine (CYTOMEL) 25 MCG tablet Take 1 tablet (25 mcg) by mouth daily 30 tablet 3     medical cannabis (Patient's own supply) See Admin Instructions (The purpose of this order is to document that the patient reports taking medical cannabis.  This is not a prescription, and is not used to certify that the patient has a  qualifying medical condition.)     Pills PRN   Lozenges PRN       menthol, Topical Analgesic, 2.5% (BENGAY VANISHIN SCENT) 2.5 % GEL topical gel Apply topically every 6 hours as needed for moderate pain 45 g 3     methotrexate sodium 2.5 MG TABS Take 4 tablets (10 mg) by mouth every 7 days (Patient taking differently: Take 4 tablets by mouth every 7 days Every Thursday) 48 tablet 0     multivitamin (CENTRUM SILVER) tablet Take 1 tablet by mouth daily       nicotine polacrilex (NICORETTE) 4 MG gum PLACE 1 PIECE INSIDE THE CHEEK AS NEEDED FOR SMOKING CESSATION 200 each 3     omeprazole (PRILOSEC) 20 MG DR capsule Take 20 mg by mouth daily as needed        prazosin (MINIPRESS) 1 MG capsule Take 1 capsule (1 mg) by mouth At Bedtime 30 capsule 1     traZODone (DESYREL) 50 MG tablet Take 1 tablet (50 mg) by mouth At Bedtime 30 tablet 1     vitamin D2 (ERGOCALCIFEROL) 56750 units (1250 mcg) capsule Take 50,000 Units by mouth twice a week mon and thurs       VITALS                     There were no vitals taken for this visit.   MENTAL STATUS EXAM         Alertness: alert  and oriented  Appearance: adequately groomed  Behavior/Demeanor: cooperative, with good  eye contact   Speech: regular rate and rhythm  Language: intact  Psychomotor: normal or unremarkable  Mood: depressed   Affect: appropriate; congruent to: mood- yes, content- yes  Thought Process/Associations: unremarkable  Thought Content:  Reports suicidal ideation;  Denies violent ideation and delusions   Perception:  Reports none;  Denies auditory hallucinations and visual hallucinations  Insight: good  Judgment: good  Cognition: (6) oriented: time, person, and place  attention span: intact  concentration: intact  recent memory: intact  remote memory: intact  fund of knowledge: appropriate  Gait and Station: N/A (telehealth)    LABS and DATA     PHQ9 TODAY = not completed due to telehealth  PHQ 12/15/2020 3/18/2021 3/30/2021   PHQ-9 Total Score 15 20 16   Q9:  Thoughts of better off dead/self-harm past 2 weeks Not at all More than half the days Several days   F/U: Thoughts of suicide or self-harm - - Yes   F/U: Self harm-plan - - No   F/U: Self-harm action - - No   F/U: Safety concerns - - No       Recent Labs   Lab Test 03/21/21  0808 03/17/21  0748   CR 0.92 1.12   GFRESTIMATED >90 74     Recent Labs   Lab Test 03/21/21  0808 03/17/21  0748   AST 20 17   ALT 34 30   ALKPHOS 100 91     PSYCHOTROPIC DRUG INTERACTIONS     Increased risk of QTc prolongation: Flexeril, trazodone, fluoxetine, aripiprazole, Suboxone  Increased risk of serotonin syndrome: Flexeril, escitalopram, oxycodone, trazodone  Increased risk of CNS/respiratory depression: Suboxone, trazodone, Klonopin, aripiprazole    MANAGEMENT:  Monitoring for adverse effects and patient is aware of risks    RISK STATEMENT for SAFETY     SUICIDE RISK ASSESSMENT-  Risk factors for self-harm: previous suicide attempt, suicide plan [multiple including stabbing self, using table saw, driving into a tree], access to lethal means [gun, stockpile, vehicle, table saw], recent psych inpt , recent symptom worsening, hopelessness, severe insomnia, financial/legal stress, significant pain and takes opiates.  Mitigating factors: h/o seeking help , commitment to family and participation in DBT or day program.  The patient does  appear to be at imminent risk for self-harm, hospitalization is recommended which the pt does  agree to. Voluntary hospitalization will be arranged.    DIAGNOSES                       Major Depressive Disorder, recurrent, severe, without psychotic features  Generalized Anxiety Disorder, with panic  Suicidal Ideation    Chronic Pain  Ankylosing Spondylitis    ASSESSMENT                         Today, patient presents with interim worsening of depression and increase in suicidal ideation.  Contacted clinic last week expressing concern with suicidal thoughts and recommended presentation to ED for evaluation and  voluntary admission at that time, however patient declined and reported plan to present to the ED over weekend if he felt unsafe or suicidal ideation worsened.  Since then he has had impulsive and increased frequency of suicidal thoughts with multiple different plans including using a table saw, which she had turned on at one point, stabbing himself, and driving into a tree.  He is currently participating in day treatment programming, but is home alone most days as his wife has returned to work and does not currently feel like home is a safe place for him to be given the severity of his suicidal thoughts.  In addition, other symptoms of depression have also worsened.  Recommended inpatient hospitalization for safety, which the patient was agreeable with, wife not present during today's visit but patient reported she was coming home to bring him to the emergency department.  We will have clinic follow-up with patient if there are barriers to ED presentation.      At last hospitalization patient seemed to benefit from cross titration to fluoxetine and initiation of Abilify, however unclear if fluoxetine has been helpful long-term.  Could consider alternative antidepressant such as duloxetine or TCA given ongoing chronic pain, patient has previous trial of these medications specifically for migraines/pain but were never trialed at antidepressant doses.  May also consider continued up titration of Abilify or alternative SGA, including Latuda.    Given patient's continued worsening depression and increasing suicidal ideation would consider outpatient DBT referral, as many of his suicidal thoughts appear to be impulsive, and believe he would benefit from the increased support and group plus individual therapy structure.    Finally, believe patient would greatly benefit from inpatient pain consult and discussion with outpatient pain management team (Susana Pike) as he reports uncontrolled pain (worsened after medication  changes made at last hospitalization) as a significant contributing factor to his current presentation.     [place] was checked today:  Indicates no medication misuse .    PLAN                                                                                 Hospitalize today:  Mr Breen endorses suicidal ideation with a plan, does not feel safe at home and is agreeable to hospitalization. He cites significant pain as a main contributor to the depression severity. The plan today is for his wife to transport him to [facility] where he will be screened medically, then admitted to Psychiatry. Does not need DEC assessment. We request that the inpatient team obtain a Pain Med consult to reassess the current pain management regimen. Recommendations for depression management are included in this progress note.  MD Prasad    1) Medications Prior to Hospitalization, anticipate changes  - Continue fluoxetine 40mg daily   - Continue aripiprazole 5mg daily  - Continue prazosin 1mg at bedtime   - Continue trazodone 50mg at bedtime   - Continue liothyronine 25mcg daily    - Discontinue hydroxyzine (patient taking previous prescription)    - clonazepam 1mg by PCP    2) Therapy-scheduled for intake but patient cannot recall when or where.    3) Next Due   Labs- as needed  EKG- consider repeat EKG given multiple medications with risk of EKG prolongation, encouraged patient to follow-up with PCP.  Rating scales- serial PHQ9s    4) Referrals  No Referrals needed for outpatient  Recommend inpatient Pain Med Consult while hospitalized on Psych    5) RTC: Following discharge from inpatient.    6) Crisis Numbers:   Provided routinely in AVS     After hours:  [phone]      TREATMENT RISK STATEMENT:  The risks, benefits, alternatives and potential adverse effects have been discussed and are understood by the pt. The pt understands the risks of using street drugs or alcohol. There are no medical contraindications, the pt agrees to  treatment with the ability to do so. The pt knows to call the clinic for any problems or to access emergency care if needed.  Medical and substance use concerns are documented above.  Psychotropic drug interaction check was done, including changes made today.    PROVIDER: Slim Sage MD    Patient was staffed via video with Dr. Downing who will sign the note.  Supervisor is Dr. Solo.

## 2021-06-15 NOTE — PLAN OF CARE
Problem: Adult Inpatient Plan of Care  Goal: Patient-Specific Goal (Individualized)  Outcome: Improving  Flowsheets (Taken 6/15/2021 7716)  Patient-Specific Goals (Include Timeframe): want my pain under control  Goal: Absence of Hospital-Acquired Illness or Injury  Outcome: Improving  Goal: Optimal Comfort and Wellbeing  Outcome: Improving    Patient arrived the unit at about 1800, flat affect on approach, but compliant with admission and search process, endorsed anxiety and depression, depression @ 10, anxiety @ 6, rated back pain @ 10, declined intervention , verbalized that tylenol and ibuprofen doesn't help, emotional support provided, no other other known concerns at this time, will continue to offer support as needed for the remaining part of the shift.

## 2021-06-15 NOTE — TELEPHONE ENCOUNTER
DEC bypass per Mike.  B:  clinic sent patient to ed for inpt admit. Pt worsening depression SI with thghts to crash car into a tree or fall on a buzz saw. Pt has chronic pain that has not been improved through the pain clinic. Hopeless, helpless, anhedonic.  A: dx depression, calm, cooperative,vol.  R: Nelson/20  Patient cleared and ready for behavioral bed placement: Yes

## 2021-06-15 NOTE — TELEPHONE ENCOUNTER
LVM for patient letting him know writer is available if he has any questions about admission or want to talk.

## 2021-06-15 NOTE — PLAN OF CARE
Problem: Adult Inpatient Plan of Care  Goal: Absence of Hospital-Acquired Illness or Injury  Outcome: Improving  Goal: Optimal Comfort and Wellbeing  Outcome: Improving

## 2021-06-15 NOTE — PATIENT INSTRUCTIONS
Nice to see you today Les. As we discussed:   - Inpatient hospitalization recommended      **For crisis resources, please see the information at the end of this document**     Patient Education      Thank you for coming to the [facility] [place] [facility].    Lab Testing:  If you had lab testing today and your results are reassuring or normal they will be mailed to you or sent through [employer] within 7 days. If the lab tests need quick action we will call you with the results. The phone number we will call with results is # [phone] (home) . If this is not the best number please call our clinic and change the number.    Medication Refills:  If you need any refills please call your pharmacy and they will contact us. Our fax number for refills is [phone]. Please allow three business for refill processing. If you need to  your refill at a new pharmacy, please contact the new pharmacy directly. The new pharmacy will help you get your medications transferred.     Scheduling:  If you have any concerns about today's visit or wish to schedule another appointment please call our office during normal business hours [phone] (8-5:00 M-F)    Contact Us:  Please call [phone] during business hours (8-5:00 M-F).  If after clinic hours, or on the weekend, please call  [phone].    Financial Assistance [phone]  [facility] Billing [phone]  Central Billing Office, [facility]alth: [phone]  [place] Billing [phone]  Medical Records [phone]  [place] Patient Bill of Rights [email or web address]       MENTAL HEALTH CRISIS NUMBERS:  For a medical emergency please call  911 or go to the nearest ER.     [facility]:   [facility] -[phone]   Crisis Residence [facility]idence -[phone]   Walk-In Counseling Center [facility] -[phone]   COPE 24/7 [facility] Team  -[phone] (adults)/[phone] (child)  [facility]: [facility] team - [phone]      [facility]:   [facility] - [phone]   Walk-in counseling [facility] - [phone]   Walk-in counseling [facility] - [phone]   Crisis Residence [facility] Residence - [phone]  Urgent Care Adult Mental Dvsvpa-[phone] mobile unit/ 24/7 crisis line    National Crisis Numbers:   National Suicide Prevention Lifeline: 7-398-106-TALK (258-914-6418)  Poison Control Center - 1-407.279.3602  EPS/resources for a list of additional resources (SOS)  Trans Lifeline a hotline for transgender people 6-564-274-8154  The Tu Project a hotline for LGBT youth 2-711-452-0225  Crisis Text Line: For any crisis 24/7   To: 903882  see www.crisistextline.org  - IF MAKING A CALL FEELS TOO HARD, send a text!         Again thank you for choosing [facility] [place] [facility] and please let us know how we can best partner with you to improve you and your family's health.    You may be receiving a survey regarding this appointment. We would love to have your feedback, both positive and negative. The survey is done by an external company, so your answers are anonymous.

## 2021-06-15 NOTE — ED NOTES
"RN's first interaction with the pt, pt verbalizing frustration that he was tricked to being here and that his SI is a result of his pain/depression from being in constant pain and having no quality of life and that his pain isn't being addressed. Pt states that if he continues living like this he is getting closer and closer to \"doing something about it.\" stating he cannot live like this any longer.  Pt then states then he will just leave if no one is going to address his pain and that he has \"many other options at home other than overdosing.\"  Pt fixated on \"no one\" addressing his pain in the perception that he is going to overdose on pills. Pt apologetic to RN after voicing frustration about his pain not being addressed and his discomfort in ED.   "

## 2021-06-16 LAB
ALBUMIN SERPL-MCNC: 3.2 G/DL (ref 3.4–5)
ALP SERPL-CCNC: 88 U/L (ref 40–150)
ALT SERPL W P-5'-P-CCNC: 33 U/L (ref 0–70)
ANION GAP SERPL CALCULATED.3IONS-SCNC: 4 MMOL/L (ref 3–14)
AST SERPL W P-5'-P-CCNC: 23 U/L (ref 0–45)
BILIRUB SERPL-MCNC: 0.9 MG/DL (ref 0.2–1.3)
BUN SERPL-MCNC: 14 MG/DL (ref 7–30)
CALCIUM SERPL-MCNC: 8.2 MG/DL (ref 8.5–10.1)
CHLORIDE SERPL-SCNC: 109 MMOL/L (ref 94–109)
CO2 SERPL-SCNC: 28 MMOL/L (ref 20–32)
CREAT SERPL-MCNC: 1.09 MG/DL (ref 0.66–1.25)
ERYTHROCYTE [DISTWIDTH] IN BLOOD BY AUTOMATED COUNT: 12.4 % (ref 10–15)
GFR SERPL CREATININE-BSD FRML MDRD: 77 ML/MIN/{1.73_M2}
GLUCOSE SERPL-MCNC: 92 MG/DL (ref 70–99)
HCT VFR BLD AUTO: 42.6 % (ref 40–53)
HGB BLD-MCNC: 14.6 G/DL (ref 13.3–17.7)
INTERPRETATION ECG - MUSE: NORMAL
MCH RBC QN AUTO: 29.3 PG (ref 26.5–33)
MCHC RBC AUTO-ENTMCNC: 34.3 G/DL (ref 31.5–36.5)
MCV RBC AUTO: 86 FL (ref 78–100)
PLATELET # BLD AUTO: 166 10E9/L (ref 150–450)
POTASSIUM SERPL-SCNC: 4.2 MMOL/L (ref 3.4–5.3)
PROT SERPL-MCNC: 6.7 G/DL (ref 6.8–8.8)
RBC # BLD AUTO: 4.98 10E12/L (ref 4.4–5.9)
SODIUM SERPL-SCNC: 141 MMOL/L (ref 133–144)
TSH SERPL DL<=0.005 MIU/L-ACNC: 0.65 MU/L (ref 0.4–4)
WBC # BLD AUTO: 6.1 10E9/L (ref 4–11)

## 2021-06-16 PROCEDURE — 99223 1ST HOSP IP/OBS HIGH 75: CPT | Mod: AI | Performed by: PSYCHIATRY & NEUROLOGY

## 2021-06-16 PROCEDURE — 250N000013 HC RX MED GY IP 250 OP 250 PS 637: Performed by: STUDENT IN AN ORGANIZED HEALTH CARE EDUCATION/TRAINING PROGRAM

## 2021-06-16 PROCEDURE — 84443 ASSAY THYROID STIM HORMONE: CPT | Performed by: STUDENT IN AN ORGANIZED HEALTH CARE EDUCATION/TRAINING PROGRAM

## 2021-06-16 PROCEDURE — 36415 COLL VENOUS BLD VENIPUNCTURE: CPT | Performed by: STUDENT IN AN ORGANIZED HEALTH CARE EDUCATION/TRAINING PROGRAM

## 2021-06-16 PROCEDURE — 124N000002 HC R&B MH UMMC

## 2021-06-16 PROCEDURE — 80053 COMPREHEN METABOLIC PANEL: CPT | Performed by: STUDENT IN AN ORGANIZED HEALTH CARE EDUCATION/TRAINING PROGRAM

## 2021-06-16 PROCEDURE — 85027 COMPLETE CBC AUTOMATED: CPT | Performed by: STUDENT IN AN ORGANIZED HEALTH CARE EDUCATION/TRAINING PROGRAM

## 2021-06-16 RX ORDER — CLONAZEPAM 1 MG/1
1 TABLET ORAL DAILY PRN
Status: DISCONTINUED | OUTPATIENT
Start: 2021-06-16 | End: 2021-06-21 | Stop reason: HOSPADM

## 2021-06-16 RX ADMIN — Medication 1 TABLET: at 08:43

## 2021-06-16 RX ADMIN — NICOTINE POLACRILEX 4 MG: 2 GUM, CHEWING BUCCAL at 18:39

## 2021-06-16 RX ADMIN — ATENOLOL 25 MG: 25 TABLET ORAL at 08:42

## 2021-06-16 RX ADMIN — CLONAZEPAM 1 MG: 1 TABLET ORAL at 21:34

## 2021-06-16 RX ADMIN — NICOTINE POLACRILEX 4 MG: 2 GUM, CHEWING BUCCAL at 13:16

## 2021-06-16 RX ADMIN — FOLIC ACID 5 MG: 1 TABLET ORAL at 08:42

## 2021-06-16 RX ADMIN — NICOTINE POLACRILEX 4 MG: 2 GUM, CHEWING BUCCAL at 20:18

## 2021-06-16 RX ADMIN — ACETAMINOPHEN 650 MG: 325 TABLET, FILM COATED ORAL at 11:36

## 2021-06-16 RX ADMIN — NICOTINE POLACRILEX 4 MG: 2 GUM, CHEWING BUCCAL at 09:22

## 2021-06-16 RX ADMIN — NICOTINE POLACRILEX 4 MG: 2 GUM, CHEWING BUCCAL at 11:37

## 2021-06-16 RX ADMIN — CLONAZEPAM 0.5 MG: 0.5 TABLET ORAL at 06:41

## 2021-06-16 RX ADMIN — NICOTINE POLACRILEX 2 MG: 2 GUM, CHEWING BUCCAL at 06:41

## 2021-06-16 RX ADMIN — ACETAMINOPHEN 650 MG: 325 TABLET, FILM COATED ORAL at 05:51

## 2021-06-16 RX ADMIN — ARIPIPRAZOLE 5 MG: 5 TABLET ORAL at 08:43

## 2021-06-16 RX ADMIN — NICOTINE POLACRILEX 2 MG: 2 GUM, CHEWING BUCCAL at 05:52

## 2021-06-16 RX ADMIN — LIOTHYRONINE SODIUM 25 MCG: 25 TABLET ORAL at 08:43

## 2021-06-16 RX ADMIN — BUPRENORPHINE AND NALOXONE 1 FILM: 8; 2 FILM, SOLUBLE BUCCAL; SUBLINGUAL at 08:43

## 2021-06-16 RX ADMIN — FLUOXETINE 40 MG: 20 CAPSULE ORAL at 08:42

## 2021-06-16 RX ADMIN — BUPRENORPHINE AND NALOXONE 1 FILM: 8; 2 FILM, SOLUBLE BUCCAL; SUBLINGUAL at 20:17

## 2021-06-16 ASSESSMENT — ACTIVITIES OF DAILY LIVING (ADL)
LAUNDRY: WITH SUPERVISION
LAUNDRY: WITH SUPERVISION
DRESS: INDEPENDENT
ORAL_HYGIENE: INDEPENDENT
HYGIENE/GROOMING: INDEPENDENT
ORAL_HYGIENE: INDEPENDENT
HYGIENE/GROOMING: INDEPENDENT
DRESS: INDEPENDENT
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
## 2021-06-16 NOTE — PLAN OF CARE
Pt appeared to sleep for 5.75 hours overnight. Pt was up at 0545 and asked for his morning medications. Pt was informed that morning medications are scheduled for 0800. Pt offered and received a prn of Tylenol for lower back pain that he rated at a 9 out of 10 for pain. Pt also given a warm pack for his back pain. Pt was also given nicotine gum; however, the order is for only 2 mg of nicotine gum and pt would like the order to be 2-4 mg of nicotine gum. Pt also asked that his medications of oxycodone and OxyContin be added to his orders. Pt said he was taking them for pain prior to admission. This writer agreed to pass along the request to his day nurse and encouraged the pt to talk to his providers today regarding his requests and concerns. At 0641 the pt was administered 0.5 mg of Klonopin by another nurse for anxiety. When asked, pt stated he felt little relief from his back pain with the Tylenol that was given. Pt offered another warm pack for pain; however, he declined. No other concerns were reported or noted. No snacks given. Pt has fasting labs in the morning.

## 2021-06-16 NOTE — PLAN OF CARE
BEHAVIORAL TEAM DISCUSSION    Participants: Dr. Yu, Psychiatry Resident, Pharmacy Student, Coleen FLORES, Ina Gonzales CTC  Progress: New Patient   Anticipated length of stay: 3-4 days  Continued Stay Criteria/Rationale: SI with plan. Needs clinical stabilization and medication management.   Medical/Physical: See H&P note  Precautions:   Behavioral Orders   Procedures     Code 1 - Restrict to Unit     Discontinue 1:1 attendant for suicide risk     Order Specific Question:   I have performed an in person assessment of the patient     Answer:   Based on this assessment the patient no longer requires a one on one attendant at this point in time.     Order Specific Question:   Rationale     Answer:   Medical Record Reviewed     Order Specific Question:   Rationale     Answer:   Patient States able to remain safe in hospital     Order Specific Question:   Rationale     Answer:   Modifications to care environment made to mitigate safety risk     Order Specific Question:   Rationale     Answer:   Routine observations are sufficient to monitor safety.     Routine Programming     As clinically indicated     Status 15     Every 15 minutes.     Suicide precautions     Patients on Suicide Precautions should have a Combination Diet ordered that includes a Diet selection(s) AND a Behavioral Tray selection for Safe Tray - with utensils, or Safe Tray - NO utensils       Plan: Patient will have ongoing psychiatric assessment. Medications will be reviewed and adjusted per MD as indicated. Outpatient providers will be contacted for care coordination. CTC will continue to assess needs and  ensure appropriate follow up care is in place.  Rationale for change in precautions or plan: None      _____  I have reviewed this note. Caitlin Yu MD

## 2021-06-16 NOTE — PLAN OF CARE
"  Problem: Behavioral Health Plan of Care  Goal: Patient-Specific Goal (Individualization)  Flowsheets (Taken 6/16/2021 1211)  Patient Vulnerabilities:   adverse childhood experience(s)   poor physical health   history of unsuccessful treatment   poor impulse control   occupational insecurity   traumatic event  Patient Personal Strengths:   family/social support   expressive of emotions   expressive of needs   relationship stability   motivated for treatment  Note:   Personal Plan of Care    Patient goals:  \"I need individual therapy\"  \"I will continue with day treatment\"    Plan for admission:  1. stabilization of mood disorder symptoms.   2. Absence of SI/Safe with self  3. Medication management per Mds  4. Coordination of Care with outpatient providers/family  5. Psychiatric follow up care in place              "
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
## 2021-06-16 NOTE — PLAN OF CARE
Pt has not attended any groups.  Pt reports SI with no plan.  Pt did some spend some time sitting in the lounge.  Pt napping in his room.  Pt talked about his back pain.  Pt states the medical marijuana and suboxone don't help.  Pt states Oxycodone and OxyContin only thing that helps with his pain.  Pt has egg crate mattress on his bed.  Pt been utilizing hot pack.    Problem: Adult Inpatient Plan of Care  Goal: Plan of Care Review  Outcome: No Change  Goal: Patient-Specific Goal (Individualized)  Outcome: No Change  Goal: Absence of Hospital-Acquired Illness or Injury  Outcome: No Change  Goal: Optimal Comfort and Wellbeing  Outcome: No Change  Goal: Readiness for Transition of Care  Outcome: No Change     Problem: Behavioral Health Plan of Care  Goal: Plan of Care Review  Outcome: No Change  Goal: Patient-Specific Goal (Individualization)  Outcome: No Change  Goal: Adheres to Safety Considerations for Self and Others  Outcome: No Change  Goal: Absence of New-Onset Illness or Injury  Outcome: No Change  Goal: Optimized Coping Skills in Response to Life Stressors  Outcome: No Change  Goal: Develops/Participates in Therapeutic [place] to Support Successful Transition  Outcome: No Change     Problem: Suicidal Behavior  Goal: Suicidal Behavior is Absent or Managed  Outcome: No Change

## 2021-06-17 ENCOUNTER — TELEPHONE (OUTPATIENT)
Dept: PSYCHIATRY | Facility: CLINIC | Age: 56
End: 2021-06-17

## 2021-06-17 PROCEDURE — 99232 SBSQ HOSP IP/OBS MODERATE 35: CPT | Mod: GC | Performed by: PSYCHIATRY & NEUROLOGY

## 2021-06-17 PROCEDURE — 250N000013 HC RX MED GY IP 250 OP 250 PS 637: Performed by: STUDENT IN AN ORGANIZED HEALTH CARE EDUCATION/TRAINING PROGRAM

## 2021-06-17 PROCEDURE — 124N000002 HC R&B MH UMMC

## 2021-06-17 RX ORDER — QUETIAPINE FUMARATE 50 MG/1
50 TABLET, FILM COATED ORAL AT BEDTIME
Status: DISCONTINUED | OUTPATIENT
Start: 2021-06-17 | End: 2021-06-18

## 2021-06-17 RX ADMIN — FLUOXETINE 40 MG: 20 CAPSULE ORAL at 09:11

## 2021-06-17 RX ADMIN — FOLIC ACID 5 MG: 1 TABLET ORAL at 09:12

## 2021-06-17 RX ADMIN — NICOTINE POLACRILEX 4 MG: 2 GUM, CHEWING BUCCAL at 07:33

## 2021-06-17 RX ADMIN — Medication 1 TABLET: at 09:11

## 2021-06-17 RX ADMIN — QUETIAPINE FUMARATE 50 MG: 50 TABLET ORAL at 20:20

## 2021-06-17 RX ADMIN — NICOTINE POLACRILEX 4 MG: 2 GUM, CHEWING BUCCAL at 12:06

## 2021-06-17 RX ADMIN — NICOTINE POLACRILEX 4 MG: 2 GUM, CHEWING BUCCAL at 20:20

## 2021-06-17 RX ADMIN — BUPRENORPHINE AND NALOXONE 1 FILM: 8; 2 FILM, SOLUBLE BUCCAL; SUBLINGUAL at 20:20

## 2021-06-17 RX ADMIN — NICOTINE POLACRILEX 4 MG: 2 GUM, CHEWING BUCCAL at 05:01

## 2021-06-17 RX ADMIN — ACETAMINOPHEN 650 MG: 325 TABLET, FILM COATED ORAL at 04:54

## 2021-06-17 RX ADMIN — BUPRENORPHINE AND NALOXONE 1 FILM: 8; 2 FILM, SOLUBLE BUCCAL; SUBLINGUAL at 09:12

## 2021-06-17 RX ADMIN — NICOTINE POLACRILEX 4 MG: 2 GUM, CHEWING BUCCAL at 14:13

## 2021-06-17 RX ADMIN — LIOTHYRONINE SODIUM 25 MCG: 25 TABLET ORAL at 09:11

## 2021-06-17 RX ADMIN — ATENOLOL 25 MG: 25 TABLET ORAL at 09:11

## 2021-06-17 RX ADMIN — ERGOCALCIFEROL 50000 UNITS: 1.25 CAPSULE, LIQUID FILLED ORAL at 14:12

## 2021-06-17 RX ADMIN — CLONAZEPAM 1 MG: 1 TABLET ORAL at 04:54

## 2021-06-17 RX ADMIN — NICOTINE POLACRILEX 4 MG: 2 GUM, CHEWING BUCCAL at 18:29

## 2021-06-17 RX ADMIN — ARIPIPRAZOLE 5 MG: 5 TABLET ORAL at 09:12

## 2021-06-17 ASSESSMENT — ACTIVITIES OF DAILY LIVING (ADL)
ORAL_HYGIENE: INDEPENDENT
DRESS: INDEPENDENT
LAUNDRY: WITH SUPERVISION
HYGIENE/GROOMING: INDEPENDENT
DRESS: INDEPENDENT;SCRUBS (BEHAVIORAL HEALTH)
HYGIENE/GROOMING: INDEPENDENT
ORAL_HYGIENE: INDEPENDENT
LAUNDRY: WITH SUPERVISION

## 2021-06-17 NOTE — TELEPHONE ENCOUNTER
"Not having a very pleasant stay inpatient.  Wants to have a priority placed on getting him back on    Thinking about suicide more and more because of the pain. Believes it would be a lot easier to manage his depression if he had pain control.    \"I'd rather eat a bullet than continue to deal with this pain\"    -Offering Seroquel for sleep tonight but patient know that will not treat his pain    Called inpatient RN who stated that pain team no longer sees patient on their unit.  Requested pain consult in conjunction with current hospitalization and she stated that inpatient provider had reached out to outpatient provider.    Will relay message to provider for feedback.  "

## 2021-06-17 NOTE — TELEPHONE ENCOUNTER
Elana,   Thanks for passing this along. I did hear from the inpatient team, Dr. Meek contacted me and she mentioned they were planning to contact outpatient pain team due to concern that he's on a pain contract and that could potentially put inpatient pain team, inpatient psych, and Les in a difficult position if outpatient pain team does not agree with management.     I do agree that pain is a significant factor, however depression I believe is making the pain less tolerable. As discussed with his outpatient pain team he continued to have uncontrolled/unchanged pain on continually escalating doses of opioids. I believe that we do need to have a two pronged approach here (pain and depression).     I passed all of this along to his inpatient team, Dr. Meek, and believe they do have his best interest in mind but are struggling with how to best address his pain.    I would encourage Les (or any inpatient) to direct all of his concerns to the inpatient team, as they are the one's who are able to help him most currently, and if he does have concerns he can always request they reach out to his outpatient team members.     Please let me know if there's anything else that I can help with!  Slim

## 2021-06-17 NOTE — PROGRESS NOTES
"[facility], [place]    Psychiatry Progress Note    Date of Service (when I saw the patient): 06/17/2021    Interval History   The patient's care was discussed with the treatment team and chart notes were reviewed.    Today during the interview with the treatment team, Jamison expressed frustration that the team \"isn't doing anything to help my pain\". He believes that the best solution will be to start taking opiates again. He says the suboxone \"doesn't do anything\", though was not interested in discontinuing it. He again expressed vivid suicidal ideation with a plan, stating that if his pain is not better controlled \"I'll put a bullet in my head and you'd see my name in the obituaries tomorrow\". He states that he does not want to die and knows that his family would be hurt, but that he cannot continue to live like this. He feels helpless and hopeless in addition to feeling guilt and shame for \"lying around the house all day\" and being unable to work and provide for his family.    He stated that he doesn't sleep well and rarely is able to sleep for more than an hour or two at a time. We discussed targeting sleep as a way to improve his overall health and emotional state, and he was amenable to starting quetiapine at night for this.       Collateral information from wife:  This writer called the patient's wife Gris to hear her perspective on how he is doing. She is extremely concerned about him and feels that the hospital is the right place for him right now. She is very worried that he will hurt or kill himself and is terrified of this happening. She feels that the opiates were not good for him emotionally and believes that as time goes on he will do better without them. She worries that he doesn't share much with him in regards to how he is feeling and how he is managing his pain, and has concerns that he may at times be purposefully hiding things from her. She understands that he needs " "his privacy and she doesn't need to know every detail of his life but feels uncomfortable that he doesn't share more. She states that she wants him to get better, \"whatever it takes\" and hopes that this hospitalization will reduce his risk of suicide.    The Review of Systems is negative other than noted in the HPI    Physical Exam       BP: 110/73 Pulse: (!) 46(typical for pt)   Resp: 16 SpO2: 93 % O2 Device: None (Room air)    Vitals:    06/15/21 1030   Weight: 120.2 kg (265 lb)     Vital Signs with Ranges  Pulse:  [46] 46  Resp:  [16] 16  BP: (110)/(73) 110/73  SpO2:  [93 %] 93 %    Appearance:  awake, alert, dressed in hospital scrubs, moderate distress  Attitude: mostly cooperative, though becomes agitated at times  Eye Contact:  intense  Mood:  \"frustrated\"  Affect:  mood congruent, intensity is elevated and full range. Angry and resentful  Speech:  clear, coherent and decreased prosody, rambling  Psychomotor Behavior:  no evidence of tardive dyskinesia, dystonia, or tics  Thought Process:  goal oriented, circumstantial (returns to same ideas repeatedly)  Associations:  no loose associations  Thought Content:  suicidal ideation present, no auditory hallucinations present and no visual hallucinations present. Content is entirely focused on pain and difficult to redirect  Insight:  fair/poor  Judgment:  poor  Oriented to:  Date, city, building  Attention Span and Concentration:  fair  Recent and Remote Memory:  fair  Language: English with appropriate syntax and vocabulary  Fund of Knowledge: appropriate  Muscle Strength and Tone: normal  Gait and Station: Using walker while in the hospital (cane at home). Antalgic gait     Data   Results for orders placed or performed during the hospital encounter of 06/15/21   Drug abuse screen 6 urine (chem dep)     Status: Abnormal   Result Value Ref Range    Amphetamine Qual Urine Negative NEG^Negative    Barbiturates Qual Urine Negative NEG^Negative    Benzodiazepine Qual " Urine Negative NEG^Negative    Cannabinoids Qual Urine Positive (A) NEG^Negative    Cocaine Qual Urine Negative NEG^Negative    Ethanol Qual Urine Negative NEG^Negative    Opiates Qualitative Urine Negative NEG^Negative   Asymptomatic SARS-CoV-2 COVID-19 Virus (Coronavirus) by PCR     Status: None    Specimen: Nasopharyngeal   Result Value Ref Range    SARS-CoV-2 Virus Specimen Source Nasopharyngeal     SARS-CoV-2 PCR Result NEGATIVE     SARS-CoV-2 PCR Comment (Note)    CBC with platelets     Status: None   Result Value Ref Range    WBC 6.1 4.0 - 11.0 10e9/L    RBC Count 4.98 4.4 - 5.9 10e12/L    Hemoglobin 14.6 13.3 - 17.7 g/dL    Hematocrit 42.6 40.0 - 53.0 %    MCV 86 78 - 100 fl    MCH 29.3 26.5 - 33.0 pg    MCHC 34.3 31.5 - 36.5 g/dL    RDW 12.4 10.0 - 15.0 %    Platelet Count 166 150 - 450 10e9/L   Comprehensive metabolic panel     Status: Abnormal   Result Value Ref Range    Sodium 141 133 - 144 mmol/L    Potassium 4.2 3.4 - 5.3 mmol/L    Chloride 109 94 - 109 mmol/L    Carbon Dioxide 28 20 - 32 mmol/L    Anion Gap 4 3 - 14 mmol/L    Glucose 92 70 - 99 mg/dL    Urea Nitrogen 14 7 - 30 mg/dL    Creatinine 1.09 0.66 - 1.25 mg/dL    GFR Estimate 77 >60 mL/min/[1.73_m2]    GFR Estimate If Black 89 >60 mL/min/[1.73_m2]    Calcium 8.2 (L) 8.5 - 10.1 mg/dL    Bilirubin Total 0.9 0.2 - 1.3 mg/dL    Albumin 3.2 (L) 3.4 - 5.0 g/dL    Protein Total 6.7 (L) 6.8 - 8.8 g/dL    Alkaline Phosphatase 88 40 - 150 U/L    ALT 33 0 - 70 U/L    AST 23 0 - 45 U/L   TSH with free T4 reflex and/or T3 as indicated     Status: None   Result Value Ref Range    TSH 0.65 0.40 - 4.00 mU/L   EKG 12-lead, tracing only     Status: None   Result Value Ref Range    Interpretation ECG Click View Image link to view waveform and result          Assessment & Plan   Jailene Breen is a 55 year old  male with a past psychiatric history of MDD and JEFF who presented to the ED with SI in the context of visit with psychiatrist 6/15,  debilitating chronic pain, and increased psychosocial stressors. Significant symptoms include SI, irritable, depressed and sleep issues. His last psychiatric hospitalization was in March 2021 at [facility] for SI.    Hospital Course: Jamison was treated on station 20 as a voluntary patient. On 6/17/2021 we initiated quetiapine 50mg at night to help with sleep.    The risks, benefits, alternatives and side effects have been discussed and are understood by the patient and other caregivers.    Principal psychiatric diagnosis:   - Major Depressive Disorder, recurrent, severe, without psychotic features     Secondary psychiatric diagnoses:   - Generalized anxiety disorder with panic  - Alcohol use disorder, severe, in sustained remission    Medications:   PTA medications continued:  - fluoxetine 40mg for MDD  - aripiprazole 5mg daily for MDD/anxiety  - buprenorphine-naloxone (suboxone) 8-2mg film BID  - trazodone 50 mg at bedtime PRN for insomnia  - clonazepam 0.5 mg daily prn for anxiety  - hydroxyzine q4h prn for anxiety  - Prazosin 1mg at bedtime   - quetiapine 50mg at bedtime    New medications:  - Hydroxyzine 25-50 mg Q4H PRN for anxiety    PTA medications held:  - None    Medical diagnoses to be addressed this admission:   # Rheumatoid Arthritis and Ankylosing Spondylitis  - etanercept 50 mg weekly injection on Thursdays  - methotrexate 10 mg weekly on Thursdays  - chlorzoxazone 500 mg tid for muscle spasms     # Hypertension  - Atenolol 25mg daily -- placed hold parameters 6/17 due to low blood pressure    # Hypothyroidism  - Liothyronine 25mcg daily     # Vitamin deficiencies  - continue multivitamin, vitamin D2, folic acid (note folic acid needs to be taken with methotrexate per rheumatology notes)    Legal Status:   - Voluntary    Safety Assessment:   - Checks: Status 15  Precautions: Suicide  - Pt has not required locked seclusion or restraints in the past 24 hours to maintain safety, please refer to RN documentation  for further details.    Consults:   - None    Social/Therapeutic:  - Patient will be treated in therapeutic milieu with appropriate individual and group therapies as described.    Disposition:   - TBD, likely no sooner than Monday June 21 as patient remains actively suicidal    Renetta Meek MD, PhD  Psychiatry Resident, PGY-1   06/17/21        Attestation:  This patient has been seen and evaluated by me, Sajan Leon MD.  I have discussed this patient with the house staff team including the resident and medical student and I agree with the findings and plan in this note.    I have reviewed today's vital signs, medications, labs and imaging. Sajan Leon MD , PhD.

## 2021-06-17 NOTE — PLAN OF CARE
Pt has been visible in the milieu for most of this evening shift. He has been intermittently social with select patients and has been polite and appropriate in his interactions with nursing staff. He continues to endorse ongoing, chronic lower back pain which he states that his current medication regime is minimally effective at treating. He shared with writer this evening that he used to be a very active individual who has run several marathons and half-marathons and that his current physical state is very frustrating to him. He has ongoing thoughts of SI without a plan or intent due to his pain and physical condition.  Problem: Behavioral Health Plan of Care  Goal: Plan of Care Review  Recent Flowsheet Documentation  Taken 6/16/2021 2028 by Ranjana Hernandez  Plan of Care Reviewed With: patient  Patient Agreement with Plan of Care: agrees

## 2021-06-17 NOTE — PLAN OF CARE
Assessment/Intervention/Current Symptoms and Care Coordination: Met with team. Reviewed patient's chart and progress notes.  Writer notified [facility] about current hospitalization. Writer was informed that patient can resume ADP 2A track - meets M,T,Th from 9-12.          Discharge Plan or Goal: Will return home. Will continue with the [place] [facility]. Will follow up with outpatient psychiatrist.          Barriers to Discharge: SI. Needs clinical stabilization and medication management.            Referral Status: Will be referred to individual therapy.         Legal Status: Voluntary

## 2021-06-17 NOTE — PROGRESS NOTES
"[facility], [place]    Psychiatry Progress Note    Date of Service (when I saw the patient): 06/16/2021    Interval History   The patient's care was discussed with the treatment team and chart notes were reviewed.    Today during the interview with the treatment team, the patient responded well to validation of his excruciating back pain and the feeling that he has been treated poorly by providers. He expressed frustration with the changing treatment plans and feeling like he has lost agency in his own care. At the end of the interview he brought up ideas of things he could do to address some of his pain, including getting knee replacements and/or seeing neurosurgery for a consultation for spine surgery.    The Review of Systems is negative other than noted in the HPI    Physical Exam   Temp: 98.1  F (36.7  C) Temp src: Oral BP: 110/73 Pulse: (!) 46(typical for pt)   Resp: 16 SpO2: 93 % O2 Device: None (Room air)    Vitals:    06/15/21 1030   Weight: 120.2 kg (265 lb)     Vital Signs with Ranges  Temp:  [98.1  F (36.7  C)] 98.1  F (36.7  C)  Pulse:  [46-69] 46  Resp:  [16] 16  BP: (105-110)/(69-73) 110/73  SpO2:  [93 %-100 %] 93 %    Appearance:  awake, alert, dressed in hospital scrubs, cooperative and mild distress  Attitude:  cooperative  Eye Contact:  good  Mood:  \"frustrated\"  Affect:  mood congruent, intensity is elevated and full range  Speech:  clear, coherent and decreased prosody, rambling  Psychomotor Behavior:  no evidence of tardive dyskinesia, dystonia, or tics and fidgeting  Thought Process:  linear and goal oriented  Associations:  no loose associations  Thought Content:  suicidal ideation present, no auditory hallucinations present and no visual hallucinations present  Insight:  fair/poor  Judgment:  fair  Oriented to:  Date, city, building  Attention Span and Concentration:  fair  Recent and Remote Memory:  fair  Language: English with appropriate syntax and " vocabulary  Fund of Knowledge: appropriate  Muscle Strength and Tone: normal  Gait and Station: Requires cane for balance. Antalgic gait     Data   Results for orders placed or performed during the hospital encounter of 06/15/21 (from the past 24 hour(s))   CBC with platelets   Result Value Ref Range    WBC 6.1 4.0 - 11.0 10e9/L    RBC Count 4.98 4.4 - 5.9 10e12/L    Hemoglobin 14.6 13.3 - 17.7 g/dL    Hematocrit 42.6 40.0 - 53.0 %    MCV 86 78 - 100 fl    MCH 29.3 26.5 - 33.0 pg    MCHC 34.3 31.5 - 36.5 g/dL    RDW 12.4 10.0 - 15.0 %    Platelet Count 166 150 - 450 10e9/L   Comprehensive metabolic panel   Result Value Ref Range    Sodium 141 133 - 144 mmol/L    Potassium 4.2 3.4 - 5.3 mmol/L    Chloride 109 94 - 109 mmol/L    Carbon Dioxide 28 20 - 32 mmol/L    Anion Gap 4 3 - 14 mmol/L    Glucose 92 70 - 99 mg/dL    Urea Nitrogen 14 7 - 30 mg/dL    Creatinine 1.09 0.66 - 1.25 mg/dL    GFR Estimate 77 >60 mL/min/[1.73_m2]    GFR Estimate If Black 89 >60 mL/min/[1.73_m2]    Calcium 8.2 (L) 8.5 - 10.1 mg/dL    Bilirubin Total 0.9 0.2 - 1.3 mg/dL    Albumin 3.2 (L) 3.4 - 5.0 g/dL    Protein Total 6.7 (L) 6.8 - 8.8 g/dL    Alkaline Phosphatase 88 40 - 150 U/L    ALT 33 0 - 70 U/L    AST 23 0 - 45 U/L   TSH with free T4 reflex and/or T3 as indicated   Result Value Ref Range    TSH 0.65 0.40 - 4.00 mU/L     Assessment & Plan   Jailene Breen is a 55 year old  male with a past psychiatric history of MDD and JEFF who presented to the ED with SI in the context of visit with psychiatrist 6/15, debilitating chronic pain, and increased psychosocial stressors. Significant symptoms include SI, irritable, depressed and sleep issues. His last psychiatric hospitalization was in March 2021 at [facility] for SI.    Hospital Course: The risks, benefits, alternatives and side effects have been discussed and are understood by the patient and other caregivers.    Principal psychiatric diagnosis:   - Major Depressive Disorder,  recurrent, severe, without psychotic features     Secondary psychiatric diagnoses:   - Generalized anxiety disorder with panic  - Alcohol use disorder, severe, in sustained remission    Medications:   PTA medications continued:  - fluoxetine 40mg for MDD  - aripiprazole 5mg daily for MDD/anxiety  - buprenorphine-naloxone (suboxone) 8-2mg film BID  - trazodone 50 mg at bedtime PRN for insomnia  - clonazepam 0.5 mg daily prn for anxiety  - hydroxyzine q4h prn for anxiety  - Prazosin 1mg at bedtime     New medications:  - Hydroxyzine 25-50 mg Q4H PRN for anxiety    PTA medications held:  - None    Medical diagnoses to be addressed this admission:   # Rheumatoid Arthritis and Ankylosing Spondylitis  - etanercept 50 mg weekly injection on Thursdays  - methotrexate 10 mg weekly on Thursdays  - chlorzoxazone 500 mg tid for muscle spasms     # Hypertension  - Atenolol 25mg daily     # Hypothyroidism  - Liothyronine 25mcg daily     # Vitamin deficiencies  - continue multivitamin, vitamin D2, folic acid    Legal Status:   - Voluntary    Safety Assessment:   - Checks: Status 15  Precautions: Suicide  - Pt has not required locked seclusion or restraints in the past 24 hours to maintain safety, please refer to RN documentation for further details.    Consults:   - None    Social/Therapeutic:  - Patient will be treated in therapeutic milieu with appropriate individual and group therapies as described.    Disposition:   - TBD, likely 2-3 days    Renetta Meek MD, PhD  Psychiatry Resident, PGY-1   06/16/21    Attestation:  This patient has been seen and evaluated by me, Caitlin Yu MD.  I have discussed this patient with the psychiatry resident and I agree with the findings and plan in this note.    I have reviewed today's vital signs, medications, labs and imaging. Caitlin Yu MD

## 2021-06-17 NOTE — PROGRESS NOTES
"Brief Note - Cross Cover    S: Notified pt's PTA Klonopin was 1mg qD PRN, currently ordered for 0.5mg.    O: /73   Pulse (!) 46   Temp 98.1  F (36.7  C) (Oral)   Resp 16   Ht 1.93 m (6' 4\")   Wt 120.2 kg (265 lb)   SpO2 93%   BMI 32.26 kg/m      A/P:  - Per chart this does not appear to be an intentional decrease, will update order    Violetta Tomlinson  PGY2 Psychiatry    "

## 2021-06-17 NOTE — PLAN OF CARE
Pt been very upset related to not have his pain meds changed.  Pt got very angry feels MD's not listening and spending more time with other patients vs him.  Pt now p;laying video games reports needs to get him mind on other things.  Pt reports SI no plan.    Problem: Adult Inpatient Plan of Care  Goal: Plan of Care Review  Outcome: Adequate for Discharge  Goal: Patient-Specific Goal (Individualized)  Outcome: Adequate for Discharge  Goal: Absence of Hospital-Acquired Illness or Injury  Outcome: Adequate for Discharge  Goal: Optimal Comfort and Wellbeing  Outcome: Adequate for Discharge  Goal: Readiness for Transition of Care  Outcome: Adequate for Discharge     Problem: Behavioral Health Plan of Care  Goal: Plan of Care Review  Outcome: Adequate for Discharge  Goal: Patient-Specific Goal (Individualization)  Outcome: Adequate for Discharge  Goal: Adheres to Safety Considerations for Self and Others  Outcome: Adequate for Discharge  Goal: Absence of New-Onset Illness or Injury  Outcome: Adequate for Discharge  Goal: Optimized Coping Skills in Response to Life Stressors  Outcome: Adequate for Discharge  Goal: Develops/Participates in Therapeutic [place] to Support Successful Transition  Outcome: Adequate for Discharge     Problem: Suicidal Behavior  Goal: Suicidal Behavior is Absent or Managed  Outcome: Adequate for Discharge

## 2021-06-17 NOTE — PLAN OF CARE
Pt up at 0445 with back pain and anxiety. Pt asked for and administered Tylenol and a warm pack for pain and Klonopin for anxiety. Pt later said he felt a little relief. Pt fell back asleep and slept for a total of 6.5 hours overnight. No snacks given. No other concerns reported or noted.

## 2021-06-18 PROCEDURE — 250N000013 HC RX MED GY IP 250 OP 250 PS 637: Performed by: STUDENT IN AN ORGANIZED HEALTH CARE EDUCATION/TRAINING PROGRAM

## 2021-06-18 PROCEDURE — G0177 OPPS/PHP; TRAIN & EDUC SERV: HCPCS

## 2021-06-18 PROCEDURE — 124N000002 HC R&B MH UMMC

## 2021-06-18 PROCEDURE — 99231 SBSQ HOSP IP/OBS SF/LOW 25: CPT | Mod: GC | Performed by: PSYCHIATRY & NEUROLOGY

## 2021-06-18 RX ORDER — PRAZOSIN HYDROCHLORIDE 1 MG/1
1 CAPSULE ORAL
Status: DISCONTINUED | OUTPATIENT
Start: 2021-06-18 | End: 2021-06-21 | Stop reason: HOSPADM

## 2021-06-18 RX ORDER — QUETIAPINE FUMARATE 25 MG/1
25 TABLET, FILM COATED ORAL
Status: DISCONTINUED | OUTPATIENT
Start: 2021-06-18 | End: 2021-06-21 | Stop reason: HOSPADM

## 2021-06-18 RX ADMIN — NICOTINE POLACRILEX 4 MG: 2 GUM, CHEWING BUCCAL at 17:35

## 2021-06-18 RX ADMIN — ARIPIPRAZOLE 5 MG: 5 TABLET ORAL at 08:37

## 2021-06-18 RX ADMIN — NICOTINE POLACRILEX 4 MG: 2 GUM, CHEWING BUCCAL at 11:15

## 2021-06-18 RX ADMIN — QUETIAPINE FUMARATE 75 MG: 50 TABLET ORAL at 21:13

## 2021-06-18 RX ADMIN — BUPRENORPHINE AND NALOXONE 1 FILM: 8; 2 FILM, SOLUBLE BUCCAL; SUBLINGUAL at 08:37

## 2021-06-18 RX ADMIN — BUPRENORPHINE AND NALOXONE 1 FILM: 8; 2 FILM, SOLUBLE BUCCAL; SUBLINGUAL at 19:30

## 2021-06-18 RX ADMIN — ACETAMINOPHEN 650 MG: 325 TABLET, FILM COATED ORAL at 18:06

## 2021-06-18 RX ADMIN — CLONAZEPAM 1 MG: 1 TABLET ORAL at 11:15

## 2021-06-18 RX ADMIN — NICOTINE POLACRILEX 4 MG: 2 GUM, CHEWING BUCCAL at 14:06

## 2021-06-18 RX ADMIN — Medication 1 TABLET: at 08:37

## 2021-06-18 RX ADMIN — FOLIC ACID 5 MG: 1 TABLET ORAL at 08:37

## 2021-06-18 RX ADMIN — FLUOXETINE 40 MG: 20 CAPSULE ORAL at 08:37

## 2021-06-18 RX ADMIN — TRAZODONE HYDROCHLORIDE 50 MG: 50 TABLET ORAL at 01:00

## 2021-06-18 RX ADMIN — PRAZOSIN HYDROCHLORIDE 1 MG: 1 CAPSULE ORAL at 22:58

## 2021-06-18 RX ADMIN — NICOTINE POLACRILEX 4 MG: 2 GUM, CHEWING BUCCAL at 07:06

## 2021-06-18 RX ADMIN — LIOTHYRONINE SODIUM 25 MCG: 25 TABLET ORAL at 08:37

## 2021-06-18 RX ADMIN — NICOTINE POLACRILEX 4 MG: 2 GUM, CHEWING BUCCAL at 19:30

## 2021-06-18 RX ADMIN — NICOTINE POLACRILEX 4 MG: 2 GUM, CHEWING BUCCAL at 08:40

## 2021-06-18 ASSESSMENT — ACTIVITIES OF DAILY LIVING (ADL)
DRESS: INDEPENDENT
LAUNDRY: WITH SUPERVISION
HYGIENE/GROOMING: INDEPENDENT
ORAL_HYGIENE: INDEPENDENT
HYGIENE/GROOMING: HANDWASHING;INDEPENDENT
DRESS: SCRUBS (BEHAVIORAL HEALTH);INDEPENDENT
ORAL_HYGIENE: INDEPENDENT

## 2021-06-18 NOTE — PLAN OF CARE
"Pt's affect is bland, his mood is, \"frustrated; things aren't moving fast enough.\" He endorses si-thoughts only; denies plan or intent. Pt rates his depression 7, anxiety 6. He reports he is not hopeful. Pt also endorses chronic back pain, rated 9. He declines interventions offered. Pt refused his methotrexate this am, saying he only takes this when he takes Embrel (?sp). He said he would have his wife bring this in, today ot tomorrow. His Dr was informed. See [facility] for shift asmnt.    1412) Uneventful shift. Pt said there was nothing new, after he talked to his Drs. Requested prn  klonopin for anxiety, this am.  "

## 2021-06-18 NOTE — PROGRESS NOTES
"[facility], [place]    Psychiatry Progress Note    Date of Service (when I saw the patient): 06/18/2021    Interval History   The patient's care was discussed with the treatment team and chart notes were reviewed.    Today Jamison was interviewed in his room. He had been sleeping and remained in bed for the majority of our discussion. We reiterated the content of our discussions yesterday, I.e. that his pain is so debilitating that it has led to significant loss in his life and a feeling of helplessness. We discussed goals for this hospitalization, emphasizing that while his pain is important to address and there may still be approaches that can be taken to lessen his pain, the main goal for his time here is to focus on his depression and ways that we might be able to help him find meaning in his life again. We talked about outpatient occupational therapy, which may be able to help him find areas in which he can improve his function without exacerbating his pain. He was mostly quiet and reflective during our discussion but generally open to trying new approaches to improve his quality of life.    The Review of Systems is negative other than noted in the HPI    Physical Exam   Temp: 97.9  F (36.6  C) Temp src: Oral BP: 116/70 Pulse: 52     SpO2: 96 % O2 Device: None (Room air)    Vitals:    06/15/21 1030   Weight: 120.2 kg (265 lb)     Vital Signs with Ranges  Temp:  [97.9  F (36.6  C)-98.6  F (37  C)] 97.9  F (36.6  C)  Pulse:  [48-57] 52  BP: (102-116)/(67-72) 116/70  SpO2:  [96 %] 96 %    Appearance:  awake, alert, dressed in hospital scrubs, moderate distress  Attitude: cooperative  Eye Contact: appropriate  Mood:  \"tired\"  Affect:  mood congruent, full range  Speech:  clear, coherent and decreased prosody, rambling  Psychomotor Behavior:  no evidence of tardive dyskinesia, dystonia, or tics  Thought Process:  goal oriented, circumstantial (returns to same ideas " repeatedly)  Associations:  no loose associations  Thought Content:  suicidal ideation present, no auditory hallucinations present and no visual hallucinations present. Content is entirely focused on pain and difficult to redirect  Insight:  fair/poor  Judgment:  poor  Oriented to:  Date, city, building  Attention Span and Concentration:  fair  Recent and Remote Memory:  fair  Language: English with appropriate syntax and vocabulary  Fund of Knowledge: appropriate  Muscle Strength and Tone: normal  Gait and Station: Using walker while in the hospital (cane at home). Antalgic gait     Data   Results for orders placed or performed during the hospital encounter of 06/15/21   Drug abuse screen 6 urine (chem dep)     Status: Abnormal   Result Value Ref Range    Amphetamine Qual Urine Negative NEG^Negative    Barbiturates Qual Urine Negative NEG^Negative    Benzodiazepine Qual Urine Negative NEG^Negative    Cannabinoids Qual Urine Positive (A) NEG^Negative    Cocaine Qual Urine Negative NEG^Negative    Ethanol Qual Urine Negative NEG^Negative    Opiates Qualitative Urine Negative NEG^Negative   Asymptomatic SARS-CoV-2 COVID-19 Virus (Coronavirus) by PCR     Status: None    Specimen: Nasopharyngeal   Result Value Ref Range    SARS-CoV-2 Virus Specimen Source Nasopharyngeal     SARS-CoV-2 PCR Result NEGATIVE     SARS-CoV-2 PCR Comment (Note)    CBC with platelets     Status: None   Result Value Ref Range    WBC 6.1 4.0 - 11.0 10e9/L    RBC Count 4.98 4.4 - 5.9 10e12/L    Hemoglobin 14.6 13.3 - 17.7 g/dL    Hematocrit 42.6 40.0 - 53.0 %    MCV 86 78 - 100 fl    MCH 29.3 26.5 - 33.0 pg    MCHC 34.3 31.5 - 36.5 g/dL    RDW 12.4 10.0 - 15.0 %    Platelet Count 166 150 - 450 10e9/L   Comprehensive metabolic panel     Status: Abnormal   Result Value Ref Range    Sodium 141 133 - 144 mmol/L    Potassium 4.2 3.4 - 5.3 mmol/L    Chloride 109 94 - 109 mmol/L    Carbon Dioxide 28 20 - 32 mmol/L    Anion Gap 4 3 - 14 mmol/L    Glucose  92 70 - 99 mg/dL    Urea Nitrogen 14 7 - 30 mg/dL    Creatinine 1.09 0.66 - 1.25 mg/dL    GFR Estimate 77 >60 mL/min/[1.73_m2]    GFR Estimate If Black 89 >60 mL/min/[1.73_m2]    Calcium 8.2 (L) 8.5 - 10.1 mg/dL    Bilirubin Total 0.9 0.2 - 1.3 mg/dL    Albumin 3.2 (L) 3.4 - 5.0 g/dL    Protein Total 6.7 (L) 6.8 - 8.8 g/dL    Alkaline Phosphatase 88 40 - 150 U/L    ALT 33 0 - 70 U/L    AST 23 0 - 45 U/L   TSH with free T4 reflex and/or T3 as indicated     Status: None   Result Value Ref Range    TSH 0.65 0.40 - 4.00 mU/L   EKG 12-lead, tracing only     Status: None   Result Value Ref Range    Interpretation ECG Click View Image link to view waveform and result          Assessment & Plan   Jailene Breen is a 55 year old  male with a past psychiatric history of MDD and JEFF who presented to the ED with SI in the context of visit with psychiatrist 6/15, debilitating chronic pain, and increased psychosocial stressors. Significant symptoms include SI, irritable, depressed and sleep issues. His last psychiatric hospitalization was in March 2021 at [facility] for SI.    Hospital Course: Jamison was treated on station 20 as a voluntary patient. On 6/17/2021 we initiated quetiapine 50mg at night to help with sleep. This was helpful in helping him get to sleep but he still struggled to stay asleep, so on 6/18 we increased this to 75mg with an additional 25mg available PRN. Trazodone PRN was discontinued, as was aripiprazole 5mg.    The risks, benefits, alternatives and side effects have been discussed and are understood by the patient and other caregivers.    Principal psychiatric diagnosis:   - Major Depressive Disorder, recurrent, severe, without psychotic features     Secondary psychiatric diagnoses:   - Generalized anxiety disorder with panic  - Alcohol use disorder, severe, in sustained remission    Medications:   - fluoxetine 40mg for MDD  - buprenorphine-naloxone (suboxone) 8-2mg film BID  - clonazepam 0.5 mg daily  prn for anxiety  - Prazosin 1mg at bedtime   - quetiapine 75mg at bedtime + 25mg PRN  - Hydroxyzine 25-50 mg Q4H PRN for anxiety    Medical diagnoses to be addressed this admission:   # Rheumatoid Arthritis and Ankylosing Spondylitis  - etanercept 50 mg weekly injection on Thursdays (was unable to get this this week)  - methotrexate 10 mg weekly on Thursdays     # Hypertension  - Atenolol 25mg daily -- placed hold parameters 6/17 due to low blood pressure    # Hypothyroidism  - Liothyronine 25mcg daily     # Vitamin deficiencies  - continue multivitamin, vitamin D2, folic acid (note folic acid needs to be taken with methotrexate per rheumatology notes)    Legal Status:   - Voluntary    Safety Assessment:   - Checks: Status 15  Precautions: Suicide  - Pt has not required locked seclusion or restraints in the past 24 hours to maintain safety, please refer to RN documentation for further details.    Consults:   - None    Social/Therapeutic:  - Patient will be treated in therapeutic milieu with appropriate individual and group therapies as described.    Disposition:   - TBD, likely no sooner than Monday June 21 as patient remains actively suicidal    Renetta Meek MD, PhD  Psychiatry Resident, PGY-1   06/18/21    Attestation:  This patient has been seen and evaluated by me, Sajan Leon MD.  I have discussed this patient with the house staff team including the resident and medical student and I agree with the findings and plan in this note.    I have reviewed today's vital signs, medications, labs and imaging. Sajan Leon MD , PhD.

## 2021-06-18 NOTE — PLAN OF CARE
Problem: Sleep Disturbance (Depressive Signs/Symptoms)  Goal: Improved Sleep (Depressive Signs/Symptoms)  Outcome: Improving     Patient slept most part of the shift, up twice, trazodone given per patient's request, C/O pain but declined intervention, verbalized tylenol doesn't help, refused warm pack. No other known concerns at this time, will offer support as needed.

## 2021-06-18 NOTE — PLAN OF CARE
Problem: Suicidal Behavior  Goal: Suicidal Behavior is Absent or Managed  Outcome: No Change    Pt denied SI/SIB or hallucinations. Pt spent the first part of the shift sleeping until it was dinner. Pt then spent the majority of the rest of the shift in the milieu socializing w/peers, watching TV and playing on the keyboard.     Pt is scheduled to receive his Methotrexate 10 mg dose tomorrow morning as the pharmacy didn't have it in stock.

## 2021-06-18 NOTE — PROGRESS NOTES
06/18/21 1423   General Information   Date Initially Attended OT 06/18/21     Date of Service: June 18, 2021    Description: After encouragement, Jailene attended 1 occupational therapy group today.   1:15 - 2:00. 4 total participants. Collaborative multi-step hands-on meal preparation group. Education was provided on the connection between nutrition and mood.  Pt Response: Remained quiet throughout discussion, yet attentive and participated upon approach. Demonstrated adequate process, performance, and collaborative social interaction skills.     Assessment: Demonstrates benefit from engagement in OT groups that support healthy recovery, specifically exploration of positive coping skills for symptom management, relapse prevention, and resumption of personal roles, routines and daily occupations.    Plan: OT staff will meet with pt to review the role of occupational therapy and explain the value of having them involved in their treatment plan including options to meet current needs/self-identified goals. As group attendance is established,  continued clinical observations will be made and Pt will be given self-assessment to inform OT initial assessment. Continue graded occupation-based activities for increased success towards goal and ongoing assessment. OP chronic pain specialty OT referral was conveyed to [facility].    Abigail Salinas OT on 6/18/2021 at 2:24 PM

## 2021-06-19 PROCEDURE — 124N000002 HC R&B MH UMMC

## 2021-06-19 PROCEDURE — 250N000013 HC RX MED GY IP 250 OP 250 PS 637: Performed by: STUDENT IN AN ORGANIZED HEALTH CARE EDUCATION/TRAINING PROGRAM

## 2021-06-19 PROCEDURE — 250N000012 HC RX MED GY IP 250 OP 636 PS 637: Performed by: PSYCHIATRY & NEUROLOGY

## 2021-06-19 RX ORDER — QUETIAPINE FUMARATE 100 MG/1
100 TABLET, FILM COATED ORAL AT BEDTIME
Status: DISCONTINUED | OUTPATIENT
Start: 2021-06-19 | End: 2021-06-21 | Stop reason: HOSPADM

## 2021-06-19 RX ORDER — AMOXICILLIN 250 MG
1 CAPSULE ORAL 2 TIMES DAILY PRN
Status: DISCONTINUED | OUTPATIENT
Start: 2021-06-19 | End: 2021-06-21 | Stop reason: HOSPADM

## 2021-06-19 RX ADMIN — NICOTINE POLACRILEX 4 MG: 2 GUM, CHEWING BUCCAL at 05:48

## 2021-06-19 RX ADMIN — NICOTINE POLACRILEX 4 MG: 2 GUM, CHEWING BUCCAL at 13:05

## 2021-06-19 RX ADMIN — QUETIAPINE FUMARATE 25 MG: 25 TABLET ORAL at 23:13

## 2021-06-19 RX ADMIN — BUPRENORPHINE AND NALOXONE 1 FILM: 8; 2 FILM, SOLUBLE BUCCAL; SUBLINGUAL at 21:31

## 2021-06-19 RX ADMIN — OLANZAPINE 10 MG: 10 TABLET, FILM COATED ORAL at 14:14

## 2021-06-19 RX ADMIN — QUETIAPINE FUMARATE 100 MG: 100 TABLET ORAL at 21:31

## 2021-06-19 RX ADMIN — ATENOLOL 25 MG: 25 TABLET ORAL at 08:33

## 2021-06-19 RX ADMIN — NICOTINE POLACRILEX 4 MG: 2 GUM, CHEWING BUCCAL at 20:08

## 2021-06-19 RX ADMIN — FOLIC ACID 5 MG: 1 TABLET ORAL at 08:33

## 2021-06-19 RX ADMIN — NICOTINE POLACRILEX 4 MG: 2 GUM, CHEWING BUCCAL at 14:14

## 2021-06-19 RX ADMIN — DOCUSATE SODIUM AND SENNOSIDES 1 TABLET: 8.6; 5 TABLET ORAL at 12:29

## 2021-06-19 RX ADMIN — CLONAZEPAM 1 MG: 1 TABLET ORAL at 10:02

## 2021-06-19 RX ADMIN — BUPRENORPHINE AND NALOXONE 1 FILM: 8; 2 FILM, SOLUBLE BUCCAL; SUBLINGUAL at 08:33

## 2021-06-19 RX ADMIN — METHOTREXATE SODIUM 10 MG: 2.5 TABLET ORAL at 17:00

## 2021-06-19 RX ADMIN — NICOTINE POLACRILEX 4 MG: 2 GUM, CHEWING BUCCAL at 08:38

## 2021-06-19 RX ADMIN — FLUOXETINE 40 MG: 20 CAPSULE ORAL at 08:34

## 2021-06-19 RX ADMIN — NICOTINE POLACRILEX 4 MG: 2 GUM, CHEWING BUCCAL at 10:01

## 2021-06-19 RX ADMIN — Medication 1 TABLET: at 08:33

## 2021-06-19 RX ADMIN — LIOTHYRONINE SODIUM 25 MCG: 25 TABLET ORAL at 08:33

## 2021-06-19 RX ADMIN — ACETAMINOPHEN 650 MG: 325 TABLET, FILM COATED ORAL at 05:48

## 2021-06-19 ASSESSMENT — ACTIVITIES OF DAILY LIVING (ADL)
DRESS: INDEPENDENT
HYGIENE/GROOMING: INDEPENDENT
LAUNDRY: WITH SUPERVISION
ORAL_HYGIENE: INDEPENDENT

## 2021-06-19 NOTE — PLAN OF CARE
Pt appears to have slept for 6.25 hours. C/o back pain rating 4/10 - prn Tylenol offered as ordered; was helpful. Nicotine gum 4mg given per pt's request. No other concerns noted. Will continue to monitor and offer support.     Problem: Sleep Disturbance (Depressive Signs/Symptoms)  Goal: Improved Sleep (Depressive Signs/Symptoms)  Outcome: Improving

## 2021-06-19 NOTE — PLAN OF CARE
Problem: Social, Occupational or Functional Impairment (Depressive Signs/Symptoms)  Goal: Enhanced Social, Occupational or Functional Skills (Depressive Signs/Symptoms)  Outcome: Improving     Problem: Mood Impairment (Depressive Signs/Symptoms)  Goal: Improved Mood Symptoms (Depressive Signs/Symptoms)  Outcome: No Change     Pt visible in the milieu, [facility],watched [facility],  interacts appropriately with peers, strums the guitar by himself. Pt  denies SI/SIB , denies voices. Endorsed depression at 7/10 and anxiety of 5/10. Complained of chronic pain. Pt is medication compliant, eating and drinking fine, appetite is good. Pt complained of unable to have BM since his admission, PRN Senna ordered and given.  Pt is happy about his wife visiting him today.

## 2021-06-19 NOTE — PROGRESS NOTES
"Subjective: Received notification from nursing staff this AM that Jamison signed a 12hr intent to leave. Met with Jamison to discuss rationale for signing 12hr intent, and he shared that the reason included the following:  -he feels like he's worse off now (in term of pain, poor sleep, mood) than he was when he first came in (hospital bed less comfortable than home bed)  -feels like more should be done for him (and faster) in the hospital in terms of treating pain, which he believes is the main source of his depression. Feels like if pain was under better control, that his mood would be much better. **reports that his OP provider, Dr Sage, agrees with this assessment, and that the primary team disagrees with this--is VERY frustrated with this disconnect** Agrees that some component of his depression is from other sources outside of pain, but sees pain as primary  -feels like providers here in the hospital aren't giving him enough time or energy & don't understand him--reports seeing the attending talking to another patient for nearly an hour, and this felt unfair to him, as he did not have that much time with attending. Also wishes he could meet with a therapist daily as opposed to groups, as he doesn't find these very helpful  -feels incredibly annoyed with pain scales--feels like a number isn't a valid way of understanding his pain    In terms of SI, denies current SI. Denies ever saying that he'd shoot himself in the head (\"I don't know why (primary team) said that, I don't even have a gun and couldn't afford one if I wanted to\"). Agrees that the continues to feel depressed and anxious    In regards to sleep, reports that the seroquel has been helpful for sleep onset, but he is still frequently waking up. Wants to continue this medication for now, agreeable to slight dose increase.    Per chart review (OP pain dr): Jamison has tried the following medications for pain:  OPIATES: Oxycodone (helpful), Fentanyl (100mcg/hr " patch helpful), hydrocodone (itching), Tramadol (not helpful)   NSAIDS: ibuprofen (not helpful), Aleve (not helpful)  MUSCLE RELAXANTS: methocarbamol (somewhat helpful), Flexeril (somewhat helpful but caused severe urinary retention requiring catheterization), tizanidine (unsure if helpful), metaxalone (unsure), SOMA (helpful)  ANTI-MIGRAINE MEDS: Maxalt (helpful for migraine), Imitrex injection (somewhat helpful)  ANTI-DEPRESSANTS: Prozac (helpful), Cymbalta (felt weird on it), nortriptyline (unsure if helpful), Buspar (unsure), Remeron (blacked out), bupropion (not helpful for smoking cessation)  SLEEP AIDS: Ambien (helpful)  ANTI-CONVULSANTS: gabapentin (felt odd on this medication, unsure of dose), Lyrica (not helpful for 4 months, unsure of dose), Topamax (not helpful, he felt weird on it)   TOPICALS: Lidocaine patches (not helpful), Voltaren gel (not helpful)  Other meds: Tylenol (unsure if helpful)    Plan: Fairly lengthy conversation with Les today regarding pros/cons of staying in the hospital. Agreeable to staying in the hospital until at least Monday.  -Les rescinded 12hr intent, continues to be voluntary patient. Reports that on Monday he'll decide whether he wants to leave or not (depending on if he feels his needs are being met; specifically if the primary team has any specific plans to help him with his pain & mood, because if not, he does not see any point in remaining hospitalized)  -today will increase seroquel to 100mg at bedtime for insomnia  -will also add senna-docusate for constipation, has not had BM since admission  -encouraged Les to try to be active/awake during the day to aid in better sleep at nighttime  -recommendations/considerations for primary team (if not done already):    -please touch base with Slim Sage regarding care of patient (Les also requested this)   -consider inpatient pain medicine consult while inpatient (unclear if at current time they are seeing  patients in the  north building, but worth clarifying)   -primary team to consider switching prozac to SNRI or TCA given unclear efficacy of prozac + chronic  pain syndrome   -appears OP pain provider considered adding back lyrica to pain regimen--may consider this as well   -continue to validate significance of physical pain    Latisha Lambert MD  PGY-2

## 2021-06-19 NOTE — PLAN OF CARE
Problem: Suicidal Behavior  Goal: Suicidal Behavior is Absent or Managed  Outcome: Improving   Pt was visible on the unit.He went in/out of room as desired.Pt had a flat affect with a calm mood.He denied SI,HI,hallucination,appetite and sleep issues.Pt still has anxiety,depression and back pain.Prn tylenol given with relief.Not sure when he had his last BM. Pt encouraged to drink fluids and move around .No stated goal for this evening. Pr nicotine gum utilized this shift.

## 2021-06-20 PROCEDURE — G0177 OPPS/PHP; TRAIN & EDUC SERV: HCPCS

## 2021-06-20 PROCEDURE — 250N000013 HC RX MED GY IP 250 OP 250 PS 637: Performed by: STUDENT IN AN ORGANIZED HEALTH CARE EDUCATION/TRAINING PROGRAM

## 2021-06-20 PROCEDURE — 124N000002 HC R&B MH UMMC

## 2021-06-20 RX ADMIN — FOLIC ACID 5 MG: 1 TABLET ORAL at 08:21

## 2021-06-20 RX ADMIN — LIOTHYRONINE SODIUM 25 MCG: 25 TABLET ORAL at 08:21

## 2021-06-20 RX ADMIN — NICOTINE POLACRILEX 4 MG: 2 GUM, CHEWING BUCCAL at 10:02

## 2021-06-20 RX ADMIN — ACETAMINOPHEN 650 MG: 325 TABLET, FILM COATED ORAL at 21:19

## 2021-06-20 RX ADMIN — OLANZAPINE 10 MG: 10 TABLET, FILM COATED ORAL at 13:39

## 2021-06-20 RX ADMIN — QUETIAPINE FUMARATE 100 MG: 100 TABLET ORAL at 21:17

## 2021-06-20 RX ADMIN — BUPRENORPHINE AND NALOXONE 1 FILM: 8; 2 FILM, SOLUBLE BUCCAL; SUBLINGUAL at 19:29

## 2021-06-20 RX ADMIN — FLUOXETINE 40 MG: 20 CAPSULE ORAL at 08:22

## 2021-06-20 RX ADMIN — ACETAMINOPHEN 650 MG: 325 TABLET, FILM COATED ORAL at 15:16

## 2021-06-20 RX ADMIN — NICOTINE POLACRILEX 4 MG: 2 GUM, CHEWING BUCCAL at 13:38

## 2021-06-20 RX ADMIN — CLONAZEPAM 1 MG: 1 TABLET ORAL at 10:02

## 2021-06-20 RX ADMIN — BUPRENORPHINE AND NALOXONE 1 FILM: 8; 2 FILM, SOLUBLE BUCCAL; SUBLINGUAL at 08:22

## 2021-06-20 RX ADMIN — NICOTINE POLACRILEX 4 MG: 2 GUM, CHEWING BUCCAL at 08:29

## 2021-06-20 RX ADMIN — HYDROXYZINE HYDROCHLORIDE 25 MG: 25 TABLET, FILM COATED ORAL at 18:15

## 2021-06-20 RX ADMIN — NICOTINE POLACRILEX 4 MG: 2 GUM, CHEWING BUCCAL at 16:59

## 2021-06-20 RX ADMIN — DOCUSATE SODIUM AND SENNOSIDES 1 TABLET: 8.6; 5 TABLET ORAL at 18:15

## 2021-06-20 RX ADMIN — Medication 1 TABLET: at 08:22

## 2021-06-20 RX ADMIN — ACETAMINOPHEN 650 MG: 325 TABLET, FILM COATED ORAL at 10:02

## 2021-06-20 RX ADMIN — ATENOLOL 25 MG: 25 TABLET ORAL at 08:22

## 2021-06-20 ASSESSMENT — MIFFLIN-ST. JEOR: SCORE: 2127.21

## 2021-06-20 NOTE — PROGRESS NOTES
Les participated in OT clinic this afternoon, where he initiated a chosen project (painted wooden plaque), followed through with plan, and asked for support with supplies as needed. Restricted affect, slightly withdrawn. Brightened a bit while discussing a past trip to [place].     06/20/21 1400   Occupational Therapy   Type of Intervention structured groups   Response Participates with encouragement   Hours 1

## 2021-06-20 NOTE — PLAN OF CARE
"Pt was in and out of  his room this shift. Pt presented with blunted flat affect. Pt reported his mood as \"low\". Endorsed anxiety \"6\" and depression \"7\". Denies for SI/SIB/AH/VH. Pt stated he was feeling tired this evening from the Zyprexa he took earlier in the day. Pt is medication compliant.    Problem: Activity and Energy Impairment (Depressive Signs/Symptoms)  Goal: Optimized Energy Level (Depressive Signs/Symptoms)  Outcome: No Change     Problem: Decreased Participation and Engagement (Depressive Signs/Symptoms)  Goal: Increased Participation and Engagement (Depressive Signs/Symptoms)  Outcome: No Change     Problem: Feelings of Worthlessness, Hopelessness or Excessive Guilt (Depressive Signs/Symptoms)  Goal: Enhanced Self-Esteem and Confidence (Depressive Signs/Symptoms)  Outcome: No Change     "

## 2021-06-20 NOTE — PLAN OF CARE
Problem: Suicidal Behavior  Goal: Suicidal Behavior is Absent or Managed  Outcome: No Change     Problem: Mood Impairment (Depressive Signs/Symptoms)  Goal: Improved Mood Symptoms (Depressive Signs/Symptoms)  Outcome: No Change     Pt is visible in the milieu, sits at the lounge area , played chess and video game with peer. Pt strummed guitar by himself.   Pleasant upon approach. Interactions appropriate with staff and peers. Pt endorsed  a pain of 9/10 on lower back. No grimacing or guarding noted. Pt  complained of headache and frustration  when  an admission was very loud. PRN Tylenol requested and pt stated that it helped him.  Denied SI/voices.  Appetite good. Hygiene adequate. Anxiety at 7/10 and depression at 9/10  PRN Klonopin requested. Will continue to monitor.

## 2021-06-20 NOTE — PLAN OF CARE
Problem: Suicidal Behavior  Goal: Suicidal Behavior is Absent or Managed  Outcome: Improving   Pt was visible early on in the shift . He went in and out of his room as desired. Pt went to bed at about 1930 because of increased back pain. His affect was flat but he was pleseant and cooperative.He denied SI,HI,hallucination,medication SE and appetite problems. He c/o poor sleep,constipation,anxiety and depression. Prn Senna po given.No BM yet.He was frustrated because of a new admit that was very loud and hyper verbal around the nurses' station and  in the [facility] area. His stated goal this evening was not to strangle this pt.He was redirected and medicated with prn vistaril with relief. Pt received prn tylenol twice for lower back pain rated at 9 and 8 respectively with little relief. Ice pack applied to lower back as well. Will cont to monitor.

## 2021-06-20 NOTE — PLAN OF CARE
Pt requested for prn Seroquel at 2313 after stating that he needed it to help him sleep. Pt remained in bed the rest of the night. See flow sheets for total hours slept tonight.

## 2021-06-21 ENCOUNTER — TELEPHONE (OUTPATIENT)
Dept: BEHAVIORAL HEALTH | Facility: CLINIC | Age: 56
End: 2021-06-21

## 2021-06-21 VITALS
BODY MASS INDEX: 31.7 KG/M2 | SYSTOLIC BLOOD PRESSURE: 96 MMHG | WEIGHT: 260.3 LBS | OXYGEN SATURATION: 96 % | HEART RATE: 46 BPM | RESPIRATION RATE: 16 BRPM | TEMPERATURE: 98.6 F | DIASTOLIC BLOOD PRESSURE: 61 MMHG | HEIGHT: 76 IN

## 2021-06-21 PROCEDURE — 250N000013 HC RX MED GY IP 250 OP 250 PS 637: Performed by: STUDENT IN AN ORGANIZED HEALTH CARE EDUCATION/TRAINING PROGRAM

## 2021-06-21 PROCEDURE — 99238 HOSP IP/OBS DSCHRG MGMT 30/<: CPT | Mod: GC | Performed by: PSYCHIATRY & NEUROLOGY

## 2021-06-21 RX ORDER — QUETIAPINE FUMARATE 100 MG/1
100 TABLET, FILM COATED ORAL AT BEDTIME
Qty: 30 TABLET | Refills: 0 | Status: SHIPPED | OUTPATIENT
Start: 2021-06-21 | End: 2021-06-29

## 2021-06-21 RX ORDER — QUETIAPINE FUMARATE 25 MG/1
25 TABLET, FILM COATED ORAL
Qty: 20 TABLET | Refills: 0 | Status: SHIPPED | OUTPATIENT
Start: 2021-06-21 | End: 2021-06-29

## 2021-06-21 RX ADMIN — NICOTINE POLACRILEX 4 MG: 2 GUM, CHEWING BUCCAL at 04:38

## 2021-06-21 RX ADMIN — NICOTINE POLACRILEX 4 MG: 2 GUM, CHEWING BUCCAL at 10:07

## 2021-06-21 RX ADMIN — ERGOCALCIFEROL 50000 UNITS: 1.25 CAPSULE, LIQUID FILLED ORAL at 11:02

## 2021-06-21 RX ADMIN — LIOTHYRONINE SODIUM 25 MCG: 25 TABLET ORAL at 08:43

## 2021-06-21 RX ADMIN — BUPRENORPHINE AND NALOXONE 1 FILM: 8; 2 FILM, SOLUBLE BUCCAL; SUBLINGUAL at 08:43

## 2021-06-21 RX ADMIN — FOLIC ACID 5 MG: 1 TABLET ORAL at 08:43

## 2021-06-21 RX ADMIN — Medication 1 TABLET: at 08:44

## 2021-06-21 RX ADMIN — FLUOXETINE 40 MG: 20 CAPSULE ORAL at 08:43

## 2021-06-21 RX ADMIN — NICOTINE POLACRILEX 4 MG: 2 GUM, CHEWING BUCCAL at 08:43

## 2021-06-21 RX ADMIN — DOCUSATE SODIUM AND SENNOSIDES 1 TABLET: 8.6; 5 TABLET ORAL at 08:54

## 2021-06-21 NOTE — DISCHARGE SUMMARY
Psychiatric Discharge Summary    Hospital Day #6    Jailene Breen MRN# [id number]   Age: 55 year old YOB: 1965     Date of Admission:  6/15/2021  Date of Discharge:  6/21/2021  1:10 PM  Admitting Physician:  Caitlin Yu MD  Discharge Physician:  Daniel Pro MD         Events Leading to Hospitalization:   Jailene Breen is a 55 year old male with a past psychiatric history of Major depressive disorder, generalized anxiety disorder with panic attacks, and severe alcohol use disorder in sustained admission admitted from the  ER on 6/15/2021 due to concern for SI in the context of severe chronic pain, medication adherence, and no substance abuse. He was seen the morning of discharge by his psychiatrist Dr. Slim Sage and it was recommended that he go to the ED for inpatient hospitalization due to suicidal ideation with plan.     Per ED Note:      Jailene Breen is a 55 year old male with history of ankylosing spondylitis, chronic pain, and recent EGD on 6/3/2021 who was sent by [facility] psychiatry for admission for suicidal ideation. Patient has had depression for years but this worsened after losing his job in 2017.  His depression has become worse, and has been suicidal with a plan for the past week with plan to fall on a table saw. He states [facility] wanted him to come in last week but couldn't get a ride here. He states it's been a tough weekend until he could get in.  He has had one prior suicide attempt 5-6 years ago when he tried to hang himself. He endorses helplessness, hopelessness, worthlessness. He has not been able to sleep, instead takes cat naps that don't do much. He states his appetite has been decreased, eating less and has lost some weight as a result. He weighed 275 lbs but now weighs around 265 lbs. His depression is worsened by his chronic pain secondary to his ankylosing spondylitis. Has chronic neck back knee pain, arthritis. He is always in pain in  "spite of being on 8 mg Suboxone twice a day.  and medical cannabis. He states the cannabis helps his mood but doesn't help his pain, and Suboxone \"doesn't do shit\" either. Pain is worst in low back and right side. No history of COVID-19, got vaccinated and had negative COVID-19 test earlier this month for his procedure. No rhinorrhea, sore throat, body aches, fevers, chills, nausea, vomiting, diarrhea, no loss of ense of taste or smell. Patient has a bed waiting for him.      He was medically cleared for admission to inpatient psychiatric unit.    See H&P dated 6/15/2021 for additional information.         Diagnoses:   Principal psychiatric diagnosis:   - Major Depressive Disorder, recurrent, severe, without psychotic features     Secondary psychiatric diagnoses:   - Generalized anxiety disorder with panic  - Alcohol use disorder, severe, in sustained remission         Consults:     None. Did contact and discuss care plan with Dr. Slim Sage MD (outpatient psychiatrist) and JUANITA Ramon CNP (pain provider)         Hospital Course:   Psychiatric Course: Jailene Breen was admitted to station 20 as a voluntary patient  1. Medication Trials and Changes: Discontinued PTA aripiprazole 5mg and discontinued trazodone 50mg at bedtime PRN for insomnia. Initiated quetiapine 100mg at bedtime for anxiety/mood symptoms and insomnia with additional 25mg quetiapine PRN for insomnia  2. Medication adherence: adherent  3. Change in psychiatric symptoms: Over the course of this hospitalization the patient's symptoms of suicidal ideation and acute distress improved.  4. Behaviors and group Attendance: The patient did not require chemical/physical restraints during admission. The patient was safe and appropriate. They were cooperative with cares and had good group attendance.   5. Legal Status: voluntary    Medical Course: Jailene Breen was medically cleared by the ED prior to admission to the unit. PTA medications " were continued.    Of note for the patient's primary care doctor, the patient had borderline low blood pressure and pulse readings several times (90s/50s with pulse as low as 46) and we held his atenolol dose for SBP <110 and pulse <60. With the prazosin that he takes for anxiety/hypervigilance at night and his wavering blood pressures that are often well within the normal range, we would recommend consideration of discontinuing his atenolol or switching to another regimen to help lower the risk of hypotension.    Of note for the patient's rheumatology provider: While Jamison was on the inpatient unit, we did not have etanercept (enbrel sureclick) nor methotrexate available. These were due on Thursday 6/17 (weekly dosing) and he received both on Saturday 6/19 after his wife was able to bring them into the unit.     Of note for the patient's chronic pain provider: We agree with the decision to discontinue use of opioids for Jamison's chronic pain and switching to suboxone. He is at high risk of addiction given his past history of alcohol use disorder and past behaviors with and dependence on opioid medications.     Admission labs were notable for normal CMP (exceptions: mildly low Ca at 8.2, low albumin at 3.2, low total protein at 6.7); normal CBC, normal TSH, negative COVID. EKG with sinus bradycardia (46), otherwise normal. UDS was positive for cannabinoids (patient has medical cannabis rx), otherwise negative.    Risk Assessment:  Jailene Breen was discharged to home. At the time of discharge Jailene Breen was determined to not be a danger to himself or others.    Jailene Breen has notable risk factors for self-harm, including comorbid medical condition of chronic pain in addition to past suicide attempts and significant suicidal ideation at the beginning of this hospitalization. However, risk is mitigated by commitment to family, sobriety, ability to volunteer a safety plan and history of seeking help when  needed. Additional steps taken to minimize risk include: follow-up plan with day treatment, PCP, outpatient therapist, outpatient psychiatrist, pain provider, and outpatient occupational therapy clinic. On the day of discharge, Jailene reiterated that he does not want to die, does not have immediate access to deadly means, and is comfortable reaching out to providers and/or family members in a crisis. Therefore, based on all available evidence including the factors cited above, Jailene Breen does not appear to be at imminent risk for self-harm, and is appropriate for outpatient level of care.     This document serves as a transfer of care to Jailene Breen's outpatient providers.         Discharge Medications:     Current Discharge Medication List      START taking these medications    Details   !! QUEtiapine (SEROQUEL) 100 MG tablet Take 1 tablet (100 mg) by mouth At Bedtime  Qty: 30 tablet, Refills: 0    Associated Diagnoses: Anxiety      !! QUEtiapine (SEROQUEL) 25 MG tablet Take 1 tablet (25 mg) by mouth nightly as needed (for sleep)  Qty: 20 tablet, Refills: 0    Associated Diagnoses: Anxiety       !! - Potential duplicate medications found. Please discuss with provider.      CONTINUE these medications which have NOT CHANGED    Details   acetaminophen (TYLENOL) 500 MG tablet Take 1,000 mg by mouth every 6 hours as needed for mild pain (headache)      atenolol (TENORMIN) 25 MG tablet Take 1 tablet (25 mg) by mouth daily  Qty: 90 tablet, Refills: 1    Associated Diagnoses: Essential hypertension      buprenorphine HCl-naloxone HCl (SUBOXONE) 8-2 MG per film Use 0.5 film under the tongue 4 times per day for pain, approximately every 6 hours. Max 2 films per day. Fill 6/1/21 and begin 6/3/2021.  30 day supply  Qty: 60 each, Refills: 0    Comments: VU: [id number]  Associated Diagnoses: Uncomplicated opioid dependence (H); Hip pain, right; Chronic bilateral low back pain with right-sided sciatica; Lumbar facet  joint pain; DDD (degenerative disc disease), lumbar; Ankylosing spondylitis of lumbosacral region (H); Rheumatoid arthritis involving multiple sites with positive rheumatoid factor (H)      chlorzoxazone (PARAFON FORTE) 500 MG tablet Take 1 tablet (500 mg) by mouth 3 times daily as needed for muscle spasms  Qty: 45 tablet, Refills: 1    Associated Diagnoses: Myofascial pain; Muscle spasm      clonazePAM (KLONOPIN) 1 MG tablet TAKE ONE-HALF - 1 TABLET BY MOUTH ONCE DAILY AS NEEDED FOR ANXIETY  Qty: 30 tablet, Refills: 0    Associated Diagnoses: JEFF (generalized anxiety disorder)      etanercept (ENBREL SURECLICK) 50 MG/ML autoinjector Inject 50 mg Subcutaneous once a week  Qty: 1 mL, Refills: 3    Associated Diagnoses: Rheumatoid arthritis (H)      fish oil-omega-3 fatty acids 1000 MG capsule Take 1 g by mouth daily      FLUoxetine (PROZAC) 40 MG capsule Take 1 capsule (40 mg) by mouth daily  Qty: 30 capsule, Refills: 1    Associated Diagnoses: Depression, unspecified depression type      folic acid (FOLVITE) 1 MG tablet Take 5 tablets (5 mg) by mouth daily  Qty: 150 tablet, Refills: 3    Associated Diagnoses: Depression, unspecified depression type      liothyronine (CYTOMEL) 25 MCG tablet Take 1 tablet (25 mcg) by mouth daily  Qty: 30 tablet, Refills: 3    Associated Diagnoses: Depression, unspecified depression type      medical cannabis (Patient's own supply) See Admin Instructions (The purpose of this order is to document that the patient reports taking medical cannabis.  This is not a prescription, and is not used to certify that the patient has a qualifying medical condition.)     Pills PRN   Lozenges PRN      methotrexate sodium 2.5 MG TABS Take 4 tablets (10 mg) by mouth every 7 days  Qty: 48 tablet, Refills: 0    Associated Diagnoses: Rheumatoid arthritis involving multiple sites, unspecified whether rheumatoid factor present (H)      multivitamin (CENTRUM SILVER) tablet Take 1 tablet by mouth daily     "  nicotine polacrilex (NICORETTE) 4 MG gum PLACE 1 PIECE INSIDE THE CHEEK AS NEEDED FOR SMOKING CESSATION  Qty: 200 each, Refills: 3    Associated Diagnoses: Tobacco use disorder      vitamin D2 (ERGOCALCIFEROL) 32267 units (1250 mcg) capsule Take 50,000 Units by mouth twice a week mon and thurs      omeprazole (PRILOSEC) 20 MG DR capsule Take 20 mg by mouth daily as needed       prazosin (MINIPRESS) 1 MG capsule Take 1 capsule (1 mg) by mouth At Bedtime  Qty: 30 capsule, Refills: 1    Associated Diagnoses: Insomnia due to other mental disorder         STOP taking these medications       ARIPiprazole (ABILIFY) 5 MG tablet Comments:   Reason for Stopping:         traZODone (DESYREL) 50 MG tablet Comments:   Reason for Stopping:                    Psychiatric Examination:   Appearance:  awake, alert and adequately groomed  Attitude:  cooperative  Eye Contact:  good  Mood:  \"good; looking forward to going home\"  Affect:  Somewhat irritable  Speech:  clear, coherent  Psychomotor Behavior:  no evidence of tardive dyskinesia, dystonia, or tics  Thought Process:  logical and linear  Associations:  no loose associations  Thought Content:  no evidence of suicidal ideation or homicidal ideation and no evidence of psychotic thought  Insight:  good  Judgment:  fair  Oriented to:  time, person, and place  Attention Span and Concentration:  intact  Recent and Remote Memory:  intact  Language: English with appropriate  Fund of Knowledge: appropriate  Muscle Strength and Tone: normal  Gait and Station: Limited by pain         Discharge Plan:   Health Care Follow-up:   Appointment Date/Time: 6/25/2021 at 11:00 AM.  **This will be a TeleHealth appointment**    [facility]   Therapist: Chanda Mart    Address: [address] [place] [address]     Phone Number: [phone]      Appointment Date/Time: 6/29/2021 9:15 AM. **This will be a TeleHealth appointment**   [facility]  Psychiatrist/Primary Care " Giver: Slim Sage      Address: [address] [address]     Phone Number: [phone]      Appointment Date/Time: 7/6/2021 1:00 PM. **This will be an in person visit**  [facility] [place]   Pain Management Provider: Susana GRANT CNP      Address: [address] [address] [facility] [place] [address]     Phone Number: [phone]     Appointment Date/Time: 6/22/2021 at 9:00 AM.   [facility] Adult Day Treatment: [facility]      Phone Number: [phone]      Other resources:  [facility] - Occupational Therapy   [address], [place] [address]  Phone: [phone]       Pt seen and discussed with my attending, MD Renetta Roberts MD, PhD  Psychiatry Resident, PGY-1   06/21/21      Attestation:  I, Daniel Pro, saw and evaluated the patient with the resident physician.  I agree with the findings and plan of care as documented in the resident note.  I have reviewed all labs and vital signs.                  Appendix A: All Labs This Admission:     Results for orders placed or performed during the hospital encounter of 06/15/21   Drug abuse screen 6 urine (chem dep)     Status: Abnormal   Result Value Ref Range    Amphetamine Qual Urine Negative NEG^Negative    Barbiturates Qual Urine Negative NEG^Negative    Benzodiazepine Qual Urine Negative NEG^Negative    Cannabinoids Qual Urine Positive (A) NEG^Negative    Cocaine Qual Urine Negative NEG^Negative    Ethanol Qual Urine Negative NEG^Negative    Opiates Qualitative Urine Negative NEG^Negative   Asymptomatic SARS-CoV-2 COVID-19 Virus (Coronavirus) by PCR     Status: None    Specimen: Nasopharyngeal   Result Value Ref Range    SARS-CoV-2 Virus Specimen Source Nasopharyngeal     SARS-CoV-2 PCR Result NEGATIVE     SARS-CoV-2 PCR Comment (Note)    CBC with platelets     Status: None   Result Value Ref Range     WBC 6.1 4.0 - 11.0 10e9/L    RBC Count 4.98 4.4 - 5.9 10e12/L    Hemoglobin 14.6 13.3 - 17.7 g/dL    Hematocrit 42.6 40.0 - 53.0 %    MCV 86 78 - 100 fl    MCH 29.3 26.5 - 33.0 pg    MCHC 34.3 31.5 - 36.5 g/dL    RDW 12.4 10.0 - 15.0 %    Platelet Count 166 150 - 450 10e9/L   Comprehensive metabolic panel     Status: Abnormal   Result Value Ref Range    Sodium 141 133 - 144 mmol/L    Potassium 4.2 3.4 - 5.3 mmol/L    Chloride 109 94 - 109 mmol/L    Carbon Dioxide 28 20 - 32 mmol/L    Anion Gap 4 3 - 14 mmol/L    Glucose 92 70 - 99 mg/dL    Urea Nitrogen 14 7 - 30 mg/dL    Creatinine 1.09 0.66 - 1.25 mg/dL    GFR Estimate 77 >60 mL/min/[1.73_m2]    GFR Estimate If Black 89 >60 mL/min/[1.73_m2]    Calcium 8.2 (L) 8.5 - 10.1 mg/dL    Bilirubin Total 0.9 0.2 - 1.3 mg/dL    Albumin 3.2 (L) 3.4 - 5.0 g/dL    Protein Total 6.7 (L) 6.8 - 8.8 g/dL    Alkaline Phosphatase 88 40 - 150 U/L    ALT 33 0 - 70 U/L    AST 23 0 - 45 U/L   TSH with free T4 reflex and/or T3 as indicated     Status: None   Result Value Ref Range    TSH 0.65 0.40 - 4.00 mU/L   EKG 12-lead, tracing only     Status: None   Result Value Ref Range    Interpretation ECG Click View Image link to view waveform and result

## 2021-06-21 NOTE — TELEPHONE ENCOUNTER
Absence call- Phone call and message left to check on Jailene Breen  Since Jailene Breen did not attend today.    Josefina Beauchamp, Psy., D,  L.P.

## 2021-06-21 NOTE — DISCHARGE INSTRUCTIONS
Behavioral Discharge Planning and Instructions    Summary: You were admitted on 6/15/2021  due to SI with plan.  You were treated by Dr. Pro and discharged on 6/21/2021 from [facility]    Main Diagnosis: Major depressive disorder, generalized anxiety disorder with panic attacks, and severe alcohol use disorder     Health Care Follow-up:   Appointment Date/Time: 6/25/2021 at 11:00 AM.  ***This will be a TeleHealth appointment***    [facility]   Therapist: Chanda Mart    Address: [address] [place] [address]     Phone Number: [phone]       Appointment Date/Time: 6/29/2021 9:15 AM. ***This will be a TeleHealth appointment***    [facility]  Psychiatrist/Primary Care Giver: Slim Sage      Address: [address] [address]     Phone Number: [phone]       Appointment Date/Time: 7/6/2021 1:00 PM. ***This will be an in person visit***  [facility]   Pain Management Provider: Susana GRANT CNP      Address: [address] [address] [place] [address]     Phone Number: [phone]      Appointment Date/Time: 6/22/2021 at 9:00 AM.   [facility] Adult Day Treatment: [facility]      Phone Number: [phone]        Other resources:    [facility] - Occupational Therapy     [address], [place] [address]    Phone: [phone]        Attend all scheduled appointments with your outpatient providers. Call at least 24 hours in advance if you need to reschedule an appointment to ensure continued access to your outpatient providers.     Major Treatments, Procedures and Findings:  You were provided with: a psychiatric assessment, assessed for medical stability, medication evaluation and/or management, group therapy and milieu management    Symptoms to Report: feeling more aggressive, increased confusion, losing more  "sleep, mood getting worse or thoughts of suicide    Early warning signs can include: increased depression or anxiety sleep disturbances increased thoughts or behaviors of suicide or self-harm  increased unusual thinking, such as paranoia or hearing voices    Safety and Wellness:  Take all medicines as directed.  Make no changes unless your doctor suggests them. Follow treatment recommendations.  Refrain from alcohol and non-prescribed drugs.  Ask your support system to help you reduce your access to items that could harm yourself or others. If there is a concern for safety, call 911.    Resources:   Crisis Intervention: 953.190.3852 or 182-628-3136 (TTY: 284.926.3369).  Call anytime for help.  National Milfay on Mental Illness (www.[place].mario.org): 150.604.8229 or 903-164-2691.  Suicide Awareness Voices of Education (SAVE) (www.save.org): 483-312-GVHW (4110)  National Suicide Prevention Line (www.mentalhealthmn.org): 077-610-BEQR (6908)  Mental Health Consumer/Survivor Network of [place] (www.mhcsn.net): 222.732.8379 or 702-558-4770  Mental Health Association of [place] (www.mentalhealth.org): 403.252.4055 or 927-614-6814  Self- Management and Recovery Training., SMART-- Toll free: 758.705.4892  www.[employer] Education  [facility] Crisis Response - Adult 187 954-3975  Text 4 Life: txt \"LIFE\" to 71704 for immediate support and crisis intervention  Crisis text line: Text \"[place]\" to 858941. Free, confidential, 24/7.  Crisis Intervention: 468.188.2133 or 069-607-2640. Call anytime for help.     General Medication Instructions:   See your medication sheet(s) for instructions.   Take all medicines as directed.  Make no changes unless your doctor suggests them.   Go to all your doctor visits.  Be sure to have all your required lab tests. This way, your medicines can be refilled on time.  Do not use any drugs not prescribed by your doctor.  Avoid alcohol.    Advance Directives:   Scanned document on file with [facility]? No scanned " doc  Is document scanned? Pt states no documents  Honoring Choices Your Rights Handout: Informed and given  Was more information offered? Pt declined    The Treatment team has appreciated the opportunity to work with you. If you have any questions or concerns about your recent admission, you can contact the unit which can receive your call 24 hours a day, 7 days a week. They will be able to get in touch with a Provider if needed. The unit number is [phone].

## 2021-06-21 NOTE — PLAN OF CARE
Pt appears to have slept for 6.50 hours. No concerns noted this shift. Will continue to monitor and offers support.     Problem: Suicidal Behavior  Goal: Suicidal Behavior is Absent or Managed  Outcome: Improving     Problem: Sleep Disturbance (Depressive Signs/Symptoms)  Goal: Improved Sleep (Depressive Signs/Symptoms)  Outcome: Improving

## 2021-06-21 NOTE — PLAN OF CARE
Pt discharged to home as per orders at about 12:45.     Alert and oriented, pleasant and cooperative, pt was happy and excited to discharge. Pt presented with a bright affect, denied all mental health symptoms. Discharge paper work gone over with pt and he did not have any questions. All belongings returned to pt with no issue,  signed out for.  Pt stated that there are no guns available to him and his coping mechanisms are: reaching out and talking to someone, god for a ride and snuggle with dogs. His support system are his family members.

## 2021-06-21 NOTE — PLAN OF CARE
Assessment/Intervention/Current Symptoms and Care Coordination: Met with team. Reviewed patient's chart and progress notes. Writer scheduled psychiatry appointment with [facility] 6/29/2021 9:15 AM. Writer scheduled follow up mental health and pain medication appointments. Writer notified [employer] [employer] about patient's discharge. Writer completed AVS.         Discharge Plan or Goal: Will return home. Will continue with the [employer]. Will follow up with outpatient psychiatrist and mental health providers.             Barriers to Discharge: Passive SI but denies plan or urges. Pt is ready to discharge                Referral Status: Was referred to [facility] for individual therapy            Legal Status: Voluntary

## 2021-06-22 ENCOUNTER — HOSPITAL ENCOUNTER (OUTPATIENT)
Dept: BEHAVIORAL HEALTH | Facility: CLINIC | Age: 56
End: 2021-06-22
Attending: PSYCHIATRY & NEUROLOGY
Payer: COMMERCIAL

## 2021-06-22 PROCEDURE — 90853 GROUP PSYCHOTHERAPY: CPT | Mod: GT | Performed by: PSYCHOLOGIST

## 2021-06-22 PROCEDURE — 90853 GROUP PSYCHOTHERAPY: CPT | Mod: 95 | Performed by: COUNSELOR

## 2021-06-22 PROCEDURE — 90853 GROUP PSYCHOTHERAPY: CPT | Mod: 95 | Performed by: SOCIAL WORKER

## 2021-06-22 NOTE — GROUP NOTE
Psychotherapy Group Note    PATIENT'S NAME: Jailene Breen  MRN:   [id number]  :   1965  ACCT. NUMBER: [id number]  DATE OF SERVICE: 21  START TIME: 10:00 AM  END TIME: 10:50 AM  FACILITATOR: Vale Pepe LPCC; Diana Ulrich LPCC  TOPIC: MH EBP Group: Coping Skills  [facility] Adult Mental Health Day Treatment  TRACK: 2A    NUMBER OF PARTICIPANTS: 7    Summary of Group / Topics Discussed:  Coping Skills: Meditation: Patients learned about meditation and explored how and when to utilize it to increase focus, reduce mental health symptoms, decrease physical tension, and improve mental well-being.  Approaches to meditation were presented as a means of increasing self-awareness. The benefits of various meditation practices were discussed, as well as barriers to utilization of this coping strategy.     Patient Session Goals / Objectives:    Understand the purpose and efficacy of using meditation modalities to reduce stress / symptoms.    Review / discuss situations in daily life that cause distress, where establishing a meditation routine or meditating as needed may improve functioning.      Verbalize understanding of how and when to apply grounding strategies to reduce distress and increase presence in the moment.    Practice meditation and address barriers to use in daily life.                                      Service Modality:  Video Visit     Telemedicine Visit: The patient's condition can be safely assessed and treated via synchronous audio and visual telemedicine encounter.      Reason for Telemedicine Visit: Services only offered telehealth and due to COVID-19    Originating Site (Patient Location): Patient's home    Distant Site (Provider Location): Provider Remote Setting- Home Office    Consent:  The patient/guardian has verbally consented to: the potential risks and benefits of telemedicine (video visit) versus in person care; bill my insurance or make self-payment for  services provided; and responsibility for payment of non-covered services.     Patient would like the video invitation sent by:  My Chart    Mode of Communication:  Video Conference via Medical Zoom    As the provider I attest to compliance with applicable laws and regulations related to telemedicine.              Patient Participation / Response:  Moderately participated, sharing some personal reflections / insights and adequately adequately received / provided feedback with other participants.    Demonstrated understanding of topics discussed through group discussion and participation and Expressed understanding of the relevance / importance of coping skills at distressing times in life    Treatment Plan:  Patient has a current master individualized treatment plan.  See Epic treatment plan for more information.    Vale Pepe, [facility]

## 2021-06-22 NOTE — GROUP NOTE
Psychotherapy Group Note    PATIENT'S NAME: Jailene Breen  MRN:   [id number]  :   1965  ACCT. NUMBER: [id number]  DATE OF SERVICE: 21  START TIME: 11:00 AM  END TIME: 11:50 AM  FACILITATOR: Liliya Oh LICSW  TOPIC: MH EBP Group: Cognitive Restructuring  [facility] Adult Mental Health Day Treatment  TRACK: 2A    NUMBER OF PARTICIPANTS: 7    Summary of Group / Topics Discussed:  Cognitive Restructuring: Core Beliefs: Patients received an overview of what a core belief is, and how they develop. Patients then began to identify their negative core beliefs. Patients worked to modify core beliefs with the goal of improved self-image and functioning.     Patient Session Goals / Objectives:    Familiarize self with the concept of core beliefs and how they develop.      Explore personal core beliefs (positive and negative)    Develop / advance recognition of the connection between negative thoughts and negative core beliefs.    Formulate new neutral/positive core beliefs                                      Service Modality:  Video Visit     Telemedicine Visit: The patient's condition can be safely assessed and treated via synchronous audio and visual telemedicine encounter.      Reason for Telemedicine Visit: Services only offered telehealth    Originating Site (Patient Location): Patient's home    Distant Site (Provider Location): Provider Remote Setting- Home Office    Consent:  The patient/guardian has verbally consented to: the potential risks and benefits of telemedicine (video visit) versus in person care; bill my insurance or make self-payment for services provided; and responsibility for payment of non-covered services.     Patient would like the video invitation sent by:  My Chart    Mode of Communication:  Video Conference via Medical Zoom    As the provider I attest to compliance with applicable laws and regulations related to telemedicine.                Patient Participation /  Response:  Fully participated with the group by sharing personal reflections / insights and openly received / provided feedback with other participants.    Demonstrated knowledge of personal thought patterns and how they impact their mood and behavior. and Identified barriers to changing thought patterns    Treatment Plan:  Patient has a current master individualized treatment plan.  See Epic treatment plan for more information.    Liliya Oh, LICSW

## 2021-06-22 NOTE — GROUP NOTE
Process Group Note                                    Service Modality:  Video Visit     Telemedicine Visit: The patient's condition can be safely assessed and treated via synchronous audio and visual telemedicine encounter.      Reason for Telemedicine Visit: Patient has requested telehealth visit, Patient unable to travel, Patient convenience (e.g. access to timely appointments / distance to available provider), Patient lives in a designated Health Professional Shortage Area (HPSA), and Services only offered telehealth    Originating Site (Patient Location): Patient's home    Distant Site (Provider Location): [facility]: [facility], [facility]    Consent:  The patient/guardian has verbally consented to: the potential risks and benefits of telemedicine (video visit) versus in person care; bill my insurance or make self-payment for services provided; and responsibility for payment of non-covered services.     Patient would like the video invitation sent by:  My Chart    Mode of Communication:  Video Conference via Medical Zoom    As the provider I attest to compliance with applicable laws and regulations related to telemedicine.        PATIENT'S NAME: Jailene Breen  MRN:   [id number]  :   1965  ACCT. NUMBER: [id number]  DATE OF SERVICE: 21  START TIME:  9:00 AM  END TIME:  9:50 AM  FACILITATOR: Josefina Beauchamp PsyD  TOPIC:  Process Group    Diagnoses:    Major Depressive Disorder, recurrent, moderate  JEFF  Panic Disorder    Rheumatoid Arthritis    Fredo Hughes MD MD Orthopedics 2016 End  21   Phone: [phone]; Fax: [phone]      Aiden Resendez PA  Assigned PCP  2020   Namita Sharma MD Resident Student in organized health care education/training program 2020 End  20   Phone: [phone]; Fax: [phone]      Barbie Christie, PhD LP Psychologist Licensed Mental Health 2020 End  20   Phone:  [phone]; Fax: [phone]      Comment: supervising      Susana Pike APRN CNP Referring Physician Nurse Practitioner 08/18/2020 End  8/18/20   Phone: [phone]; Fax: [phone]      Raghu Parada MD  Assigned Rheumatology Provider  10/23/2020   10/28/20            Chanelle Solo MD MD Psychiatry 02/24/2021 End  2/24/21   Phone: [phone]; Fax: [phone]      Comment: Attending      Coffin, Richard Breyen, MD Resident Psychiatry 02/24/2021 End  2/24/21   Phone: [phone]; Fax: [phone]            [facility] Mental Health Day Treatment  TRACK: 2A    NUMBER OF PARTICIPANTS: 7          Data:    Session content: At the start of this group, patients were invited to check in by identifying themselves, describing their current emotional status, and identifying issues to address in this group.   Area(s) of treatment focus addressed in this session included Symptom Management, Personal Safety and Community Resources/Discharge Planning.  Client reported being safe today.  Reported mood is  Depressed.   Goal for today is to attend therapy groups.  The client talked to the group about how he was in the hospital and is back, but was unsure about taking Seroquel, and wanted to get individual therapy, which was arranged from inpatient staff. He reported strange dreams about being eaten by a tiger, and talked about his childhood as eing chaotic.        Therapeutic Interventions/Treatment Strategies:  Psychotherapist reinforced use of skills. Treatment modalities used include Cognitive Behavioral Therapy and Dialectical Behavioral Therapy. Interventions include Coping Skills: Reviewed patients current calming practices and discussed a more formal way of practicing and accessing skills, Mindfulness: Encouraged a plan to use mindfulness skills in daily life and Symptoms Management: Promoted understanding of their diagnoses and how it impacts their  functioning.    Assessment:    Patient response:   Patient responded to session by listening, focusing on goals and being attentive    Possible barriers to participation / learning include: severity of symptoms    Health Issues:   Yes: Pain, Associated Psychological Distress       Substance Use Review:   Substance Use: No active concerns identified.    Mental Status/Behavioral Observations  Appearance:   Appropriate   Eye Contact:   Good   Psychomotor Behavior: Normal   Attitude:   Cooperative   Orientation:   All  Speech   Rate / Production: Normal    Volume:  Normal   Mood:    Anxious  Depressed  Sad   Affect:    Constricted   Thought Content:   Rumination  Thought Form:  Coherent  Logical     Insight:    Good     Plan:     Safety Plan: Recommended that patient call 911 or go to the local ED should there be a change in any of these risk factors.     Barriers to treatment: None identified    Patient Contracts (see media tab):  None    Substance Use: Provided encouragement towards sobriety     Continue or Discharge: Patient will continue in Adult Day Treatment (ADT)  as planned. Patient is likely to benefit from learning and using skills as they work toward the goals identified in their treatment plan.      Josefina Beauchamp PsyD  June 22, 2021  Elpidio Blake D,  Licensed Clinical Psychologist

## 2021-06-24 ENCOUNTER — HOSPITAL ENCOUNTER (OUTPATIENT)
Dept: BEHAVIORAL HEALTH | Facility: CLINIC | Age: 56
End: 2021-06-24
Attending: PSYCHIATRY & NEUROLOGY
Payer: COMMERCIAL

## 2021-06-24 PROCEDURE — 90853 GROUP PSYCHOTHERAPY: CPT | Mod: 95 | Performed by: PSYCHOLOGIST

## 2021-06-24 PROCEDURE — 90853 GROUP PSYCHOTHERAPY: CPT | Mod: 95 | Performed by: COUNSELOR

## 2021-06-24 PROCEDURE — 90853 GROUP PSYCHOTHERAPY: CPT | Mod: GT | Performed by: SOCIAL WORKER

## 2021-06-24 NOTE — GROUP NOTE
Process Group Note                                    Service Modality:  Video Visit     Telemedicine Visit: The patient's condition can be safely assessed and treated via synchronous audio and visual telemedicine encounter.      Reason for Telemedicine Visit: Patient has requested telehealth visit, Patient unable to travel, Patient convenience (e.g. access to timely appointments / distance to available provider), Patient lives in a designated Health Professional Shortage Area (HPSA), and Services only offered telehealth    Originating Site (Patient Location): Patient's home    Distant Site (Provider Location): [facility]: [facility], [facility]    Consent:  The patient/guardian has verbally consented to: the potential risks and benefits of telemedicine (video visit) versus in person care; bill my insurance or make self-payment for services provided; and responsibility for payment of non-covered services.     Patient would like the video invitation sent by:  My Chart    Mode of Communication:  Video Conference via Medical Zoom    As the provider I attest to compliance with applicable laws and regulations related to telemedicine.        PATIENT'S NAME: Jailene Breen  MRN:   [id number]  :   1965  ACCT. NUMBER: [id number]  DATE OF SERVICE: 21  START TIME:  9:00 AM  END TIME:  9:50 AM  FACILITATOR: Josefina Beauchamp PsyD  TOPIC:  Process Group    Diagnoses:    Major Depressive Disorder, recurrent, moderate  JEFF  Panic Disorder    Rheumatoid Arthritis    Fredo Hughes MD MD Orthopedics 2016 End  21   Phone: [phone]; Fax: [phone]      Aiden Resendez PA  Assigned PCP  2020   Namita Sharma MD Resident Student in organized health care education/training program 2020 End  20   Phone: [phone]; Fax: [phone]      Barbie Christie, PhD LP Psychologist Licensed Mental Health 2020 End  20   Phone:  [phone]; Fax: [phone]      Comment: supervising      Susana Pike APRN CNP Referring Physician Nurse Practitioner 08/18/2020 End  8/18/20   Phone: [phone]; Fax: [phone]      Raghu Parada MD  Assigned Rheumatology Provider  10/23/2020   10/28/20            Chanelle Sool MD MD Psychiatry 02/24/2021 End  2/24/21   Phone: [phone]; Fax: [phone]      Comment: Attending      Coffin, Richard Breyen, MD Resident Psychiatry 02/24/2021 End  2/24/21   Phone: [phone]; Fax: [phone]            [facility] Mental Health Day Treatment  TRACK: 2A    NUMBER OF PARTICIPANTS: 7          Data:    Session content: At the start of this group, patients were invited to check in by identifying themselves, describing their current emotional status, and identifying issues to address in this group.   Area(s) of treatment focus addressed in this session included Symptom Management, Personal Safety and Community Resources/Discharge Planning.  Client reported being safe today.  Reported mood is tired.   Goal for today is to attend therapy groups. The client talked to the group about how he is taking 100 mg of Seroquel, slept fine, but is too drowsy this morning. He discussed his pain and depression levels with the group and how he did a distraction skill and played a video game with his son.       Therapeutic Interventions/Treatment Strategies:  Psychotherapist reinforced use of skills. Treatment modalities used include Cognitive Behavioral Therapy and Dialectical Behavioral Therapy. Interventions include Cognitive Restructuring:  Assisted patient in formulating new neutral/positive alternatives to challenge less helpful / ineffective thoughts, Coping Skills: Discussed meditation skills and addressed ways to implement meditation skills , Mindfulness: Facilitated discussion of when/how to use mindfulness skills to benefit general health, mental health symptoms, and stressors and  Symptoms Management: Promoted understanding of their diagnoses and how it impacts their functioning.    Assessment:    Patient response:   Patient responded to session by giving feedback, listening and focusing on goals    Possible barriers to participation / learning include: severity of symptoms    Health Issues:   Yes: Pain, Associated Psychological Distress       Substance Use Review:   Substance Use: No active concerns identified.    Mental Status/Behavioral Observations  Appearance:   Appropriate   Eye Contact:   Good   Psychomotor Behavior: fatigued  Attitude:   Cooperative   Orientation:   All  Speech   Rate / Production: Normal    Volume:  Normal   Mood:    Depressed   Affect:    Constricted   Thought Content:   Rumination  Thought Form:  Coherent  Logical     Insight:    Good     Plan:     Safety Plan: Recommended that patient call 911 or go to the local ED should there be a change in any of these risk factors.     Barriers to treatment: None identified    Patient Contracts (see media tab):  None    Substance Use: Provided encouragement towards sobriety     Continue or Discharge: Patient will continue in Adult Day Treatment (ADT)  as planned. Patient is likely to benefit from learning and using skills as they work toward the goals identified in their treatment plan.      Josefina Beauchamp PsyD  June 24, 2021  Elpidio Blake D,  Licensed Clinical Psychologist

## 2021-06-24 NOTE — GROUP NOTE
Psychotherapy Group Note    PATIENT'S NAME: Jailene Breen  MRN:   [id number]  :   1965  ACCT. NUMBER: [id number]  DATE OF SERVICE: 21  START TIME: 11:00 AM  END TIME: 11:50 AM  FACILITATOR: Liliya Oh LICSW  TOPIC: MH EBP Group: Cognitive Restructuring  [facility] Adult Mental Health Day Treatment  TRACK: 2A    NUMBER OF PARTICIPANTS: 7    Summary of Group / Topics Discussed:  Cognitive Restructuring: Perfectionism: Patients discussed and reflected on what perfectionism is, how it develops, and how it impacts functioning. Ways to challenge perfectionism were explored and discussed by the group, with the goal of challenging perfectionistic thinking and improving functioning.    Patient Session Goals / Objectives:    Understand the concept of perfectionistic thoughts and how they develop.     Increase recognition of the connection between perfectionistic thinking and symptoms.    Explore and practice new ways to challenge the perfectionistic stance and replace with reasonable expectations of self and others.    Begin to formulate realistic personal expectations and goals.                                           Service Modality:  Video Visit     Telemedicine Visit: The patient's condition can be safely assessed and treated via synchronous audio and visual telemedicine encounter.      Reason for Telemedicine Visit: Services only offered telehealth    Originating Site (Patient Location): Patient's home    Distant Site (Provider Location): Provider Remote Setting- Home Office    Consent:  The patient/guardian has verbally consented to: the potential risks and benefits of telemedicine (video visit) versus in person care; bill my insurance or make self-payment for services provided; and responsibility for payment of non-covered services.     Patient would like the video invitation sent by:  My Chart    Mode of Communication:  Video Conference via Medical Zoom    As the provider I attest  to compliance with applicable laws and regulations related to telemedicine.           Patient Participation / Response:  Fully participated with the group by sharing personal reflections / insights and openly received / provided feedback with other participants.    Demonstrated knowledge of personal thought patterns and how they impact their mood and behavior.    Treatment Plan:  Patient has a current master individualized treatment plan.  See Epic treatment plan for more information.    Liliya Oh, [facility]

## 2021-06-24 NOTE — GROUP NOTE
Psychoeducation Group Note    PATIENT'S NAME: Jailene Breen  MRN:   [id number]  :   1965  ACCT. NUMBER: [id number]  DATE OF SERVICE: 21  START TIME: 10:00 AM  END TIME: 10:50 AM  FACILITATOR: Vale Pepe LPCC  TOPIC: MH Life Skills Group: Lifestyle Balance and Structure  [facility] Adult Mental Health Day Treatment  TRACK: 2A    NUMBER OF PARTICIPANTS: 7    Summary of Group / Topics Discussed:  Lifestyle Balance and Strucure:  Time Management: Motivation - Intrinsic and Extrinsic Motivators: Patients were taught and applied skills and strategies to effectively manage their time, energy, and resources.  Patients discussed motivation as it relates to mental health symptoms and shared individual challenges to engaging in and accomplishing meaningful activities of daily living.  Patients were educated on intrinsic and extrinsic motivation and provided with strategies on how to use these motivators to accomplish activities.     Patient Session Goals / Objectives:    Identified how mental health symptoms impact motivation and can lead to difficulty completing activities of daily living      Identified tasks they are having difficulty engaging in and completing and at least one intrinsic and one extrinsic motivator to try to help improve follow through                                          Service Modality:  Video Visit     Telemedicine Visit: The patient's condition can be safely assessed and treated via synchronous audio and visual telemedicine encounter.      Reason for Telemedicine Visit: Services only offered telehealth and due to COVID-19    Originating Site (Patient Location): Patient's home    Distant Site (Provider Location): Provider Remote Setting- Home Office    Consent:  The patient/guardian has verbally consented to: the potential risks and benefits of telemedicine (video visit) versus in person care; bill my insurance or make self-payment for services provided; and  responsibility for payment of non-covered services.     Patient would like the video invitation sent by:  My Chart    Mode of Communication:  Video Conference via Medical Zoom    As the provider I attest to compliance with applicable laws and regulations related to telemedicine.            Patient Participation / Response:  Minimally participated, only when prompted / asked.    Verbalized understanding of content    Treatment Plan:  Patient has a current master individualized treatment plan.  See Epic treatment plan for more information.    Vale Pepe, [facility]

## 2021-06-28 ENCOUNTER — MYC REFILL (OUTPATIENT)
Dept: FAMILY MEDICINE | Facility: CLINIC | Age: 56
End: 2021-06-28

## 2021-06-28 ENCOUNTER — HOSPITAL ENCOUNTER (OUTPATIENT)
Dept: BEHAVIORAL HEALTH | Facility: CLINIC | Age: 56
End: 2021-06-28
Attending: PSYCHIATRY & NEUROLOGY
Payer: COMMERCIAL

## 2021-06-28 DIAGNOSIS — F41.1 GAD (GENERALIZED ANXIETY DISORDER): ICD-10-CM

## 2021-06-28 PROCEDURE — 90853 GROUP PSYCHOTHERAPY: CPT | Mod: 95 | Performed by: SOCIAL WORKER

## 2021-06-28 PROCEDURE — 90853 GROUP PSYCHOTHERAPY: CPT | Mod: 95 | Performed by: PSYCHOLOGIST

## 2021-06-28 NOTE — GROUP NOTE
Psychotherapy Group Note    PATIENT'S NAME: Jailene Breen  MRN:   [id number]  :   1965  ACCT. NUMBER: [id number]  DATE OF SERVICE: 21  START TIME: 11:00 AM  END TIME: 11:50 AM  FACILITATOR: Liliya Oh LICSW  TOPIC: MH EBP Group: Mindfulness  [facility] Adult Mental Health Day Treatment  TRACK: 2A    NUMBER OF PARTICIPANTS: 8    Summary of Group / Topics Discussed:  Mindfulness: What is Mindfulness: Patients received an overview on what mindfulness is and how mindfulness can benefit general health, mental health symptoms, and stressors. The history of mindfulness, its application to mental health therapies, and key concepts were also discussed. Patients discussed current awareness, knowledge, and practice of mindfulness skills. Patients also discussed barriers to mindfulness practice.     Patient Session Goals / Objectives:    Demonstrated understanding of key concepts and application to daily life    Identified when/how to use mindfulness     Resolved barriers to practice    Identified plan to use mindfulness in daily life  Telemedicine Visit: The patient's condition can be safely assessed and treated via synchronous audio and visual telemedicine encounter.      Reason for Telemedicine Visit: Services only offered telehealth    Originating Site (Patient Location): Patient's home    Distant Site (Provider Location): Provider Remote Setting- Home Office    Consent:  The patient/guardian has verbally consented to: the potential risks and benefits of telemedicine (video visit) versus in person care; bill my insurance or make self-payment for services provided; and responsibility for payment of non-covered services.     Mode of Communication:  Video Conference via Backtrace I/O    As the provider I attest to compliance with applicable laws and regulations related to telemedicine.       Patient Participation / Response:  Fully participated with the group by sharing personal reflections /  insights and openly received / provided feedback with other participants.    Demonstrated understanding of mindfulness skills and benefits of practice    Treatment Plan:  Patient has a current master individualized treatment plan.  See Epic treatment plan for more information.    Liliya Oh, [facility]

## 2021-06-28 NOTE — GROUP NOTE
Process Group Note                                    Service Modality:  Video Visit     Telemedicine Visit: The patient's condition can be safely assessed and treated via synchronous audio and visual telemedicine encounter.      Reason for Telemedicine Visit: Patient has requested telehealth visit, Patient unable to travel, Patient convenience (e.g. access to timely appointments / distance to available provider), Patient lives in a designated Health Professional Shortage Area (HPSA), and Services only offered telehealth    Originating Site (Patient Location): Patient's home    Distant Site (Provider Location): [facility]: [facility], [facility]    Consent:  The patient/guardian has verbally consented to: the potential risks and benefits of telemedicine (video visit) versus in person care; bill my insurance or make self-payment for services provided; and responsibility for payment of non-covered services.     Patient would like the video invitation sent by:  My Chart    Mode of Communication:  Video Conference via Medical Zoom    As the provider I attest to compliance with applicable laws and regulations related to telemedicine.        PATIENT'S NAME: Jailene Breen  MRN:   [id number]  :   1965  ACCT. NUMBER: [id number]  DATE OF SERVICE: 21  START TIME:  9:00 AM  END TIME:  9:50 AM  FACILITATOR: Josefina Beauchamp PsyD  TOPIC:  Process Group    Diagnoses:    Major Depressive Disorder, recurrent, moderate  JEFF  Panic Disorder    Rheumatoid Arthritis    Fredo Hughes MD MD Orthopedics 2016 End  21   Phone: [phone]; Fax: [phone]      Aiden Resendez PA  Assigned PCP  2020   Namita Sharma MD Resident Student in organized health care education/training program 2020 End  20   Phone: [phone]; Fax: [phone]      Barbie Christie, PhD LP Psychologist Licensed Mental Health 2020 End  20   Phone:  [phone]; Fax: [phone]      Comment: supervising      Susana Pike APRN CNP Referring Physician Nurse Practitioner 08/18/2020 End  8/18/20   Phone: [phone]; Fax: [phone]      Raghu Parada MD  Assigned Rheumatology Provider  10/23/2020   10/28/20            Chanelle Solo MD MD Psychiatry 02/24/2021 End  2/24/21   Phone: [phone]; Fax: [phone]      Comment: Attending      Coffin, Richard Breyen, MD Resident Psychiatry 02/24/2021 End  2/24/21   Phone: [phone]; Fax: [phone]            [facility] Mental Health Day Treatment  TRACK: 2A    NUMBER OF PARTICIPANTS: 8          Data:    Session content: At the start of this group, patients were invited to check in by identifying themselves, describing their current emotional status, and identifying issues to address in this group.   Area(s) of treatment focus addressed in this session included Symptom Management, Personal Safety and Community Resources/Discharge Planning.  Client reported being safe today.  Reported mood is depressed.   Goal for today is to attend therapy. The client talked to the group about how he watched movies with his wife, and that he had take-out from [employer]  He reported that he will have appointments with rheumatology and the pain clinic.   And will talk about pain and his depression.    Therapeutic Interventions/Treatment Strategies:  Psychotherapist offered support, feedback and validation. Treatment modalities used include Cognitive Behavioral Therapy and Dialectical Behavioral Therapy. Interventions include Cognitive Restructuring:  Assisted patient in formulating new neutral/positive alternatives to challenge less helpful / ineffective thoughts, Coping Skills: Promoted understanding of how and when to apply grounding strategies to reduce distress and increase presence in the moment and Symptoms Management: Promoted understanding of their diagnoses and how it impacts their  functioning.    Assessment:    Patient response:   Patient responded to session by accepting feedback, listening and being attentive    Possible barriers to participation / learning include: severity of symptoms    Health Issues:   Yes: Pain, Associated Psychological Distress       Substance Use Review:   Substance Use: No active concerns identified.    Mental Status/Behavioral Observations  Appearance:   Appropriate   Eye Contact:   Good   Psychomotor Behavior: Normal   Attitude:   Cooperative   Orientation:   All  Speech   Rate / Production: Normal    Volume:  Normal   Mood:    Depressed   Affect:    Constricted   Thought Content:   Rumination  Thought Form:  Coherent  Logical     Insight:    Good     Plan:     Safety Plan: Recommended that patient call 911 or go to the local ED should there be a change in any of these risk factors.     Barriers to treatment: None identified    Patient Contracts (see media tab):  none    Substance Use: Provided encouragement towards sobriety     Continue or Discharge: Patient will continue in Adult Day Treatment (ADT)  as planned. Patient is likely to benefit from learning and using skills as they work toward the goals identified in their treatment plan.      Josefina Beauchamp PsyD  June 28, 2021  Elpidio Blake, TIRSO,  Licensed Clinical Psychologist

## 2021-06-29 ENCOUNTER — TELEPHONE (OUTPATIENT)
Dept: PSYCHIATRY | Facility: CLINIC | Age: 56
End: 2021-06-29

## 2021-06-29 ENCOUNTER — VIRTUAL VISIT (OUTPATIENT)
Dept: PSYCHIATRY | Facility: CLINIC | Age: 56
End: 2021-06-29
Attending: PSYCHIATRY & NEUROLOGY
Payer: COMMERCIAL

## 2021-06-29 ENCOUNTER — HOSPITAL ENCOUNTER (OUTPATIENT)
Dept: BEHAVIORAL HEALTH | Facility: CLINIC | Age: 56
End: 2021-06-29
Attending: PSYCHIATRY & NEUROLOGY
Payer: COMMERCIAL

## 2021-06-29 DIAGNOSIS — F32.A DEPRESSION, UNSPECIFIED DEPRESSION TYPE: ICD-10-CM

## 2021-06-29 DIAGNOSIS — Z79.899 ENCOUNTER FOR LONG-TERM CURRENT USE OF MEDICATION: Primary | ICD-10-CM

## 2021-06-29 DIAGNOSIS — F41.9 ANXIETY: ICD-10-CM

## 2021-06-29 DIAGNOSIS — F99 INSOMNIA DUE TO OTHER MENTAL DISORDER: ICD-10-CM

## 2021-06-29 DIAGNOSIS — F51.05 INSOMNIA DUE TO OTHER MENTAL DISORDER: ICD-10-CM

## 2021-06-29 PROCEDURE — 99214 OFFICE O/P EST MOD 30 MIN: CPT | Mod: 95 | Performed by: STUDENT IN AN ORGANIZED HEALTH CARE EDUCATION/TRAINING PROGRAM

## 2021-06-29 PROCEDURE — 90853 GROUP PSYCHOTHERAPY: CPT | Mod: 95 | Performed by: SOCIAL WORKER

## 2021-06-29 PROCEDURE — 90853 GROUP PSYCHOTHERAPY: CPT | Mod: 95 | Performed by: PSYCHOLOGIST

## 2021-06-29 RX ORDER — FLUOXETINE 40 MG/1
40 CAPSULE ORAL DAILY
Qty: 30 CAPSULE | Refills: 1 | Status: SHIPPED | OUTPATIENT
Start: 2021-06-29 | End: 2021-08-03

## 2021-06-29 RX ORDER — PRAZOSIN HYDROCHLORIDE 1 MG/1
1 CAPSULE ORAL AT BEDTIME
Qty: 30 CAPSULE | Refills: 1 | Status: SHIPPED | OUTPATIENT
Start: 2021-06-29 | End: 2021-08-03

## 2021-06-29 RX ORDER — QUETIAPINE FUMARATE 25 MG/1
25-50 TABLET, FILM COATED ORAL DAILY PRN
Qty: 30 TABLET | Refills: 0 | Status: SHIPPED | OUTPATIENT
Start: 2021-06-29 | End: 2021-08-03

## 2021-06-29 RX ORDER — QUETIAPINE FUMARATE 100 MG/1
100 TABLET, FILM COATED ORAL AT BEDTIME
Qty: 30 TABLET | Refills: 1 | Status: SHIPPED | OUTPATIENT
Start: 2021-06-29 | End: 2021-08-03

## 2021-06-29 RX ORDER — LIOTHYRONINE SODIUM 25 UG/1
25 TABLET ORAL DAILY
Qty: 30 TABLET | Refills: 1 | Status: SHIPPED | OUTPATIENT
Start: 2021-06-29 | End: 2021-08-03

## 2021-06-29 NOTE — PROGRESS NOTES
"VIDEO VISIT  Jailene Breen is a 55 year old patient who is being evaluated via a billable video visit.      The patient has been notified of following:   \"We have found that certain health care needs can be provided without the need for an in-person physical exam. This service lets us provide the care you need with a video conversation. If a prescription is necessary we can send it directly to your pharmacy. If lab work is needed we can place an order for that and you can then stop by our lab to have the test done at a later time. Insurers are generally covering virtual visits as they would in-office visits so billing should not be different than normal.  If for some reason you do get billed incorrectly, you should contact the billing office to correct it and that number is in the AVS .    Patient has given verbal consent for video visit?: Yes   How would you like to obtain your AVS?: WinProbe  AVS SmartPhrase [PsychAVS] has been placed in 'Patient Instructions': Yes      Video- Visit Details  Type of service:  video visit for medication management  Time of service:    Date:  06/29/2021    Video Start Time:  9:09 AM        Video End Time:  9:45 AM    Reason for video visit:  Patient unable to travel due to Covid-19  Originating Site (patient location):  [facility]   Location- Patient's home  Distant Site (provider location):  Remote location  Mode of Communication:  Video Conference via AmWell  Consent:  Patient has given verbal consent for video visit?: Yes        [facility]  Psychiatry Clinic  PSYCHIATRY PROGRESS NOTE     CARE TEAM:    PCP- Aiden Resendez   Rheumatology- Raghu Parada MD  [facility]- Susana GRANT; [place] PsyD    Jailene Breen is a 55 year old patient who prefers the name Les and uses pronouns he, him, his.     PERTINENT BACKGROUND                                 [most recent eval 09/21/20]     Psych " "pertinent item history includes suicidal ideation, SIB [cutting], mutiple psychotropic trials , severe med reaction, psych hosp (<3), SUBSTANCE USE: alcohol, substance use treatment  and Major Medical Problems (Rheumatoid Arthritis and Ankylosing Spondylitis)    INTERIM HISTORY        The patient reports good treatment adherence.  History was provided by the patient who was a good historian.     Since the last visit:   - Hospitalization recommended at last visit, which patient pursued voluntarily. While hospitalized patient was started on quetiapine and titrated to 100mg at bedtime. Trazodone and Abilify were discontinued. Patient requested discharge after limited intervention on pain was pursued.  - Follow-up: July 6 pain management, July 16 rheumatology. Not yet set up with individual therapist, \"personal reasons I don't like them ([facility]).\" Will follow-up with previous therapist.  - Didn't get blood pressure med in hospital. Took a couple times and noticed lightheadedness so he has stopped.  - Things \"have been going okay\".   - Depression \"hasn't been as bad.\"  - \"Still low\" motivation, energy, memory, and concentration.  - No suicidal ideation, \"at least I'm not thinking about how to kill myself.\"  - Yesterday had \"panic attack\" but \"didn't have Klonopin\" so \"that was really tough.\" Lasted a \"couple hours,\" started \"right before class.\" Then \"swept over me and I got really tired.\"  Patient requesting Klonopin from this prescriber for help with panic attacks.  - Seroquel \"does get me to sleep pretty well.\"  - Noticing some orthostasis.  No changes in appetite, daytime sedation.    RECENT PSYCH ROS:   Depression: As above   Elevated:  none  Psychosis:  none  Anxiety:  as above  Trauma Related:  none  Dysregulation:  none  Eating Disorder: no  Other: no    Adverse Effects:  denies  Pertinent Negative Symptoms: No violent ideation, aggression, psychosis, hallucinations, delusions, otto and hypomania    Recent " "Substance Use:     Alcohol- none since , used to attend AA meetings prior to COVID.  Tobacco- denies  Caffeine- yes  Cannabis- denies     Opioids- as prescribed           Narcan Kit- prescribed   Other illicit drugs- none    FAMILY and SOCIAL HISTORY                                      [per patient report]            Family Hx:  Mom - depression (since  when sister )    Social Hx:  Financial/ Work- currently attending college at [employer], is hoping to start school at [employer] for a degree in psychology after being unable to work due to diagnosis of rheumatoid arthritis and ankylosing spondylitis  Partner/ -  in .  Children- 23 and 25 year old kids      Living situation- [place], house with wife and 2 kids      Social/ Spiritual Support- Talks to  every two weeks for lunch, but \"doesn't really have friends\". Family is supportive.      PSYCH and SUBSTANCE USE Critical Summary Points since  - started escitalopram  November - increased escitalopram 20mg daily  February - stopped hydroxyzine; started trazodone 50mg at bedtime and 25mg daily as needed for anxiety   - Patient hospitalized started on Abilify, titrated to 5mg daily; escitalopram switched to fluoxetine 40mg daily; started prazosin 1mg at bedtime. Pain management switched oxycodone to Suboxone and started medical marijuana.  May - patient still taking hydroxyzine, discontinued today.   - patient hospitalized (6/15/21) discontinued Abilify and trazodone, started on quetiapine & titrated to 100mg at bedtime and 25mg as needed for sleep.     MEDICAL HISTORY  and ALLERGY     ALLERGIES: Mirtazapine, Hydrocodone, Ms contin [morphine], and Remeron soltab     Patient Active Problem List   Diagnosis     CARDIOVASCULAR SCREENING; LDL GOAL LESS THAN 160     Anxiety     Overweight (BMI 25.0-29.9)     Back pain     Tear meniscus knee, right, initial encounter     Rheumatoid arthritis " involving multiple sites, unspecified rheumatoid factor presence     Chronic pain     Right-sided thoracic back pain, unspecified chronicity     JEFF (generalized anxiety disorder)     Panic attack     Syncope, unspecified syncope type     Ankylosing spondylitis (H)     Tobacco use disorder     Moderate major depression (H)     Immunocompromised (H)     Essential hypertension     Suicidal ideation     Insomnia due to other mental disorder     Suicide ideation     Depression, unspecified depression type     Major depressive disorder, recurrent episode, severe with mixed features (H)         MEDICAL REVIEW OF SYSTEMS     Contraception- none  A comprehensive review of systems was performed and is negative other than noted in the HPI.    MEDICATIONS          Current Outpatient Medications   Medication Sig Dispense Refill     acetaminophen (TYLENOL) 500 MG tablet Take 1,000 mg by mouth every 6 hours as needed for mild pain (headache)       atenolol (TENORMIN) 25 MG tablet Take 1 tablet (25 mg) by mouth daily 90 tablet 1     buprenorphine HCl-naloxone HCl (SUBOXONE) 8-2 MG per film Use 0.5 film under the tongue 4 times per day for pain, approximately every 6 hours. Max 2 films per day. Fill 6/1/21 and begin 6/3/2021.  30 day supply 60 each 0     chlorzoxazone (PARAFON FORTE) 500 MG tablet Take 1 tablet (500 mg) by mouth 3 times daily as needed for muscle spasms 45 tablet 1     clonazePAM (KLONOPIN) 1 MG tablet TAKE ONE-HALF - 1 TABLET BY MOUTH ONCE DAILY AS NEEDED FOR ANXIETY 30 tablet 0     etanercept (ENBREL SURECLICK) 50 MG/ML autoinjector Inject 50 mg Subcutaneous once a week 1 mL 3     fish oil-omega-3 fatty acids 1000 MG capsule Take 1 g by mouth daily       FLUoxetine (PROZAC) 40 MG capsule Take 1 capsule (40 mg) by mouth daily 30 capsule 1     folic acid (FOLVITE) 1 MG tablet Take 5 tablets (5 mg) by mouth daily 150 tablet 3     liothyronine (CYTOMEL) 25 MCG tablet Take 1 tablet (25 mcg) by mouth daily 30 tablet 3  "    medical cannabis (Patient's own supply) See Admin Instructions (The purpose of this order is to document that the patient reports taking medical cannabis.  This is not a prescription, and is not used to certify that the patient has a qualifying medical condition.)     Pills PRN   Lozenges PRN       methotrexate sodium 2.5 MG TABS Take 4 tablets (10 mg) by mouth every 7 days (Patient taking differently: Take 4 tablets by mouth every 7 days Every Thursday) 48 tablet 0     multivitamin (CENTRUM SILVER) tablet Take 1 tablet by mouth daily       nicotine polacrilex (NICORETTE) 4 MG gum PLACE 1 PIECE INSIDE THE CHEEK AS NEEDED FOR SMOKING CESSATION 200 each 3     omeprazole (PRILOSEC) 20 MG DR capsule Take 20 mg by mouth daily as needed        prazosin (MINIPRESS) 1 MG capsule Take 1 capsule (1 mg) by mouth At Bedtime 30 capsule 1     QUEtiapine (SEROQUEL) 100 MG tablet Take 1 tablet (100 mg) by mouth At Bedtime 30 tablet 0     QUEtiapine (SEROQUEL) 25 MG tablet Take 1 tablet (25 mg) by mouth nightly as needed (for sleep) 20 tablet 0     vitamin D2 (ERGOCALCIFEROL) 16377 units (1250 mcg) capsule Take 50,000 Units by mouth twice a week mon and thurs       VITALS                     There were no vitals taken for this visit.   MENTAL STATUS EXAM         Alertness: alert  and oriented  Appearance: adequately groomed  Behavior/Demeanor: cooperative, with good  eye contact   Speech: regular rate and rhythm  Language: intact  Psychomotor: normal or unremarkable  Mood: \"alright\"   Affect: appropriate; congruent to: mood- yes, content- yes  Thought Process/Associations: unremarkable  Thought Content:  Reports none;  Denies suicidal ideation, violent ideation and delusions   Perception:  Reports none;  Denies auditory hallucinations and visual hallucinations  Insight: good  Judgment: good  Cognition: (6) oriented: time, person, and place  attention span: intact  concentration: intact  recent memory: intact  remote memory: " intact  fund of knowledge: appropriate  Gait and Station: N/A (telehealth)    LABS and DATA     PHQ9 TODAY = not completed due to telehealth  PHQ 12/15/2020 3/18/2021 3/30/2021   PHQ-9 Total Score 15 20 16   Q9: Thoughts of better off dead/self-harm past 2 weeks Not at all More than half the days Several days   F/U: Thoughts of suicide or self-harm - - Yes   F/U: Self harm-plan - - No   F/U: Self-harm action - - No   F/U: Safety concerns - - No       Recent Labs   Lab Test 06/16/21  0741 03/21/21  0808   CR 1.09 0.92   GFRESTIMATED 77 >90     Recent Labs   Lab Test 06/16/21  0741 03/21/21  0808   AST 23 20   ALT 33 34   ALKPHOS 88 100     Recent Labs   Lab Test 06/16/21  0741 03/21/21  0808   GLC 92 95     No lab results found.  Recent Labs   Lab Test 06/16/21  0741 03/21/21  0808   AST 23 20   ALT 33 34   ALKPHOS 88 100     Recent Labs   Lab Test 06/16/21  0741 03/21/21  0808 03/17/21  0748   WBC 6.1 6.8 8.3   ANEU  --  3.7 4.4   HGB 14.6 15.6 15.0    246 211     PSYCHOTROPIC DRUG INTERACTIONS     Increased risk of QTc prolongation: fluoxetine, quetiapine, Suboxone  Increased risk of serotonin syndrome: fluoxetine, Suboxone  Increased risk of CNS/respiratory depression: Suboxone, Klonopin, quetiapine    MANAGEMENT:  Monitoring for adverse effects and patient is aware of risks    RISK STATEMENT for SAFETY     Today, Jamison Breen does not report any suicidal ideation and does not appear to be an imminent risk to self or others.    SUICIDE RISK ASSESSMENT-  Risk factors for self-harm: previous suicide attempt, recent psych inpt , financial/legal stress, significant pain and takes opiates.  Mitigating factors: h/o seeking help , commitment to family and participation in DBT or day program.  The patient does not appear to be at imminent risk for self-harm, hospitalization is recommended which the pt does not agree to. No hospitalization will be arranged. Safety plan reviewed today including contacting family,  friends (), day treatment team, clinic, or crisis line.    DIAGNOSES                       Major Depressive Disorder, recurrent, severe, without psychotic features  Generalized Anxiety Disorder, with panic  Suicidal Ideation    Chronic Pain  Ankylosing Spondylitis    ASSESSMENT                         Today, patient presents with interim improvement in depression and resolution of suicidal ideation following psychiatric inpatient hospitalization.  During hospitalization Abilify and trazodone were stopped and patient was started on quetiapine, for sleep and augmentation of depression.  He continues to report significant issues with pain and is scheduled for follow-up with pain management team and rheumatology.  Believes that optimization of pain management and treatment of rheumatoid arthritis/ankylosing spondylitis will likely have a significant impact on mood.  Given upcoming appointments and improvement in mood will continue with current medication regimen, which patient was on at discharge.  However, patient notes significant orthostatic symptoms while hospitalized, starting quetiapine, and continuing atenolol.  Home blood pressure cuff ordered for ongoing monitoring.  Patient also has history of hyperlipidemia and will obtain baseline antipsychotic monitoring labs given initiation of quetiapine while hospitalized and may consider initiation of metformin in the future to prevent further metabolic changes or weight gain.    While hospitalized patient was set up with therapy referral/intake at [facility], however upon discharge patient canceled this appointment reporting previous bad experiences with [facility] and plans to follow-up with a previous therapist.  This appointment has not yet scheduled but patient believes he will be able to see her soon.  He continues with daily treatment therapy group, but has previously questioned how helpful this has been.  Today, we discussed possible DBT referral to help  decrease suicidal ideation and increased distress tolerance.  Patient would also benefit from increased structure and group/individual therapy which DBT would provide.    Finally, patient reports previous panic attack notes unable to take Klonopin to help with his symptoms as he had run out of medication.  Patient requested refill of Klonopin from this provider.  Reviewed risks with patient and the Klonopin and would not be provided given ongoing prescription for opioid.  However, discussed alternative treatments (previously trialed hydroxyzine seen which was ineffective), but reviewed quetiapine as a possible alternative which has been shown equally efficacious in treating panic attacks as benzodiazepines.  Encouraged patient to trial quetiapine for  of panic attacks.    Safety plan reviewed with patient, as above. Given patient's recent hospitalization will contact patient in 2 weeks for a brief check-in, with follow-up in 1 month.     [place] was [place] today:  Indicates no medication misuse .    PLAN                                                                                   1) Medications:  - Continue fluoxetine 40mg daily   - Continue quetiapine 100mg at bedtime  - Continue quetiapine 25-50mg as needed for anxiety and panic  - Continue prazosin 1mg at bedtime   - Continue liothyronine 25mcg daily    - clonazepam 1mg by PCP    2) Therapy- Patient working on setting up therapy with previous therapist.    3) Next Due   Labs- AP monitoring labs due   EKG- as needed  Rating scales- serial PHQ9s    4) Referrals  No Referrals needed    5) RTC: 4-6 weeks    6) Crisis Numbers:   Provided routinely in AVS     After hours:  [phone]      TREATMENT RISK STATEMENT:  The risks, benefits, alternatives and potential adverse effects have been discussed and are understood by the pt. The pt understands the risks of using street drugs or alcohol. There are no medical contraindications, the pt agrees to treatment  with the ability to do so. The pt knows to call the clinic for any problems or to access emergency care if needed.  Medical and substance use concerns are documented above.  Psychotropic drug interaction check was done, including changes made today.    PROVIDER: Slim Sage MD    Patient was not staffed.  Supervisor is Dr. Solo who will sign the note.  I did not see this patient directly. I have reviewed the documentation and I agree with the resident's plan of care.     Chanelle Solo MD

## 2021-06-29 NOTE — GROUP NOTE
Psychotherapy Group Note    PATIENT'S NAME: Jailene Breen  MRN:   [id number]  :   1965  ACCT. NUMBER: [id number]  DATE OF SERVICE: 21  START TIME: 11:00 AM  END TIME: 11:50 AM  FACILITATOR: Liliya Oh LICSW  TOPIC: MH EBP Group: Emotions Management  [facility] Mental Health Day Treatment  TRACK: 2A    NUMBER OF PARTICIPANTS: 6    Summary of Group / Topics Discussed:  Emotions Management: Understanding Emotions: Patients discussed the purpose of emotions and function they serve in our lives.  Reviewed core emotions, why they happen (triggers), and how they occur. The group assisted one anothers' understanding that: emotional experiences are important; difficult emotions have a place in our lives; and the differences between various emotions.  Reviewed skills to manage worry with behavioral approaches.    Patient Session Goals / Objectives:    Demonstrate understanding of types various emotions    Identify and discuss specific emotions and when they occur; understand triggers    Discuss barriers to emotional regulation                                      Service Modality:  Video Visit     Telemedicine Visit: The patient's condition can be safely assessed and treated via synchronous audio and visual telemedicine encounter.      Reason for Telemedicine Visit: Services only offered telehealth    Originating Site (Patient Location): Patient's home    Distant Site (Provider Location): Provider Remote Setting- Home Office    Consent:  The patient/guardian has verbally consented to: the potential risks and benefits of telemedicine (video visit) versus in person care; bill my insurance or make self-payment for services provided; and responsibility for payment of non-covered services.     Patient would like the video invitation sent by:  My Chart    Mode of Communication:  Video Conference via Medical Zoom    As the provider I attest to compliance with applicable laws and regulations  related to telemedicine.           Patient Participation / Response:  Fully participated with the group by sharing personal reflections / insights and openly received / provided feedback with other participants.    Identified emotions management strategies that have helped maintain / improve symptoms in the past    Treatment Plan:  Patient has a current master individualized treatment plan.  See Epic treatment plan for more information.    Liliya Oh, WALLYSW

## 2021-06-29 NOTE — TELEPHONE ENCOUNTER
On June 29, 2021, at 8:49 AM, writer called patient at [phone] to confirm Virtual Visit. Writer unable to make contact with patient. Writer left detailed voice message for call back. [phone] left as call back number. Alyssa Koch, [facility]

## 2021-06-29 NOTE — GROUP NOTE
Process Group Note                                    Service Modality:  Video Visit     Telemedicine Visit: The patient's condition can be safely assessed and treated via synchronous audio and visual telemedicine encounter.      Reason for Telemedicine Visit: Patient has requested telehealth visit, Patient unable to travel, Patient convenience (e.g. access to timely appointments / distance to available provider), Patient lives in a designated Health Professional Shortage Area (HPSA), and Services only offered telehealth    Originating Site (Patient Location): Patient's home    Distant Site (Provider Location): [facility]: [facility], [facility]    Consent:  The patient/guardian has verbally consented to: the potential risks and benefits of telemedicine (video visit) versus in person care; bill my insurance or make self-payment for services provided; and responsibility for payment of non-covered services.     Patient would like the video invitation sent by:  My Chart    Mode of Communication:  Video Conference via Medical Zoom    As the provider I attest to compliance with applicable laws and regulations related to telemedicine.        PATIENT'S NAME: Jailene Brene  MRN:   [id number]  :   1965  ACCT. NUMBER: [id number]  DATE OF SERVICE: 21  START TIME: 10:00 AM  END TIME: 10:50 AM  FACILITATOR: Josefina Beauchamp PsyD  TOPIC:  Process Group    Diagnoses:    Major Depressive Disorder, recurrent, moderate  JEFF  Panic Disorder    Rheumatoid Arthritis    Fredo Hughes MD MD Orthopedics 2016 End  21   Phone: [phone]; Fax: [phone]      Aiden Resendez PA  Assigned PCP  2020   Namita Sharma MD Resident Student in organized health care education/training program 2020 End  20   Phone: [phone]; Fax: [phone]      Barbie Christie, PhD LP Psychologist Licensed Mental Health 2020 End  20   Phone:  [phone]; Fax: [phone]      Comment: supervising      Susana Pike APRN CNP Referring Physician Nurse Practitioner 08/18/2020 End  8/18/20   Phone: [phone]; Fax: [phone]      Raghu Parada MD  Assigned Rheumatology Provider  10/23/2020   10/28/20            Chanelle Solo MD MD Psychiatry 02/24/2021 End  2/24/21   Phone: [phone]; Fax: [phone]      Comment: Attending      Coffin, Richard Breyen, MD Resident Psychiatry 02/24/2021 End  2/24/21   Phone: [phone]; Fax: [phone]            [facility] Mental Health Day Treatment  TRACK: 2A    NUMBER OF PARTICIPANTS: 6          Data:    Session content: At the start of this group, patients were invited to check in by identifying themselves, describing their current emotional status, and identifying issues to address in this group.   Area(s) of treatment focus addressed in this session included Symptom Management, Personal Safety and Community Resources/Discharge Planning.  Client reported being safe today.  Reported mood is  Depressed.   Goal for today is to attend therapy groups.  The client talked to the group about how he had an early morning appointment with his psychiatrist, but didn't tell him that he is taking half of the Seroquel dose, and not every day. He reported feeling fatigued today. He stated that he was wrestling with his dogs and enjoying them. He stated that they are big dogs and all of them want to wrestle.      Therapeutic Interventions/Treatment Strategies:  Psychotherapist reinforced use of skills. Treatment modalities used include Cognitive Behavioral Therapy and Dialectical Behavioral Therapy. Interventions include Cognitive Restructuring:  Facilitated recognition of the connection between negative thoughts and negative core beliefs, Coping Skills: Reviewed patients current calming practices and discussed a more formal way of practicing and accessing skills, Mindfulness: Encouraged a  plan to use mindfulness skills in daily life and Symptoms Management: Promoted understanding of their diagnoses and how it impacts their functioning.    Assessment:    Patient response:   Patient responded to session by giving feedback, listening and being attentive    Possible barriers to participation / learning include: severity of symptoms    Health Issues:   Yes: Pain, Associated Psychological Distress       Substance Use Review:   Substance Use: No active concerns identified.    Mental Status/Behavioral Observations  Appearance:   Appropriate   Eye Contact:   Good   Psychomotor Behavior: Normal   Attitude:   Cooperative   Orientation:   All  Speech   Rate / Production: Normal    Volume:  Normal   Mood:    Depressed  Sad   Affect:    Constricted   Thought Content:   Rumination  Thought Form:  Coherent  Logical     Insight:    Good     Plan:     Safety Plan: Recommended that patient call 911 or go to the local ED should there be a change in any of these risk factors.     Barriers to treatment: None identified    Patient Contracts (see media tab):  None    Substance Use: Provided encouragement towards sobriety     Continue or Discharge: Patient will continue in Adult Day Treatment (ADT)  as planned. Patient is likely to benefit from learning and using skills as they work toward the goals identified in their treatment plan.      Josefina Beauchamp PsyD  June 29, 2021  Elpidio Blake D,  Licensed Clinical Psychologist

## 2021-06-30 ENCOUNTER — MYC MEDICAL ADVICE (OUTPATIENT)
Dept: PSYCHIATRY | Facility: CLINIC | Age: 56
End: 2021-06-30

## 2021-06-30 RX ORDER — CLONAZEPAM 1 MG/1
TABLET ORAL
Qty: 5 TABLET | Refills: 0 | Status: SHIPPED | OUTPATIENT
Start: 2021-06-30 | End: 2021-09-14

## 2021-06-30 NOTE — TELEPHONE ENCOUNTER
Patient sent another [employer] message asking to try calling again. Called but got VM again. Left message asking him to call the clinic at [phone].     Routed to Dr. Sage for FYI.

## 2021-06-30 NOTE — PATIENT INSTRUCTIONS
Nice to see you today Les. As we discussed:   - We will mail you a home blood pressure cuff order for you to bring to a medical supply store.  - Follow-up with fasting blood draw to monitor quetiapine  - Try to use quetiapine 25mg for severe anxiety/panic, can use a second dose 1 hour after taking the first if ineffective.  - Social work may call you with list of DBT referrals.    **For crisis resources, please see the information at the end of this document**     Patient Education      Thank you for coming to the [facility] [place] [facility].    Lab Testing:  If you had lab testing today and your results are reassuring or normal they will be mailed to you or sent through [employer] within 7 days. If the lab tests need quick action we will call you with the results. The phone number we will call with results is # [phone] (home) . If this is not the best number please call our clinic and change the number.    Medication Refills:  If you need any refills please call your pharmacy and they will contact us. Our fax number for refills is [phone]. Please allow three business for refill processing. If you need to  your refill at a new pharmacy, please contact the new pharmacy directly. The new pharmacy will help you get your medications transferred.     Scheduling:  If you have any concerns about today's visit or wish to schedule another appointment please call our office during normal business hours [phone] (8-5:00 M-F)    Contact Us:  Please call [phone] during business hours (8-5:00 M-F).  If after clinic hours, or on the weekend, please call  [phone].    Financial Assistance [phone]  [facility] Billing [phone]  Central Billing Office, [facility]: [phone]  [place] Billing [phone]  Medical Records [phone]  [place] Patient Bill of Rights [email or web address]       MENTAL  HEALTH CRISIS NUMBERS:  For a medical emergency please call  911 or go to the nearest ER.     [facility]:   [facility] -[phone]   Crisis Residence [facility] -[phone]   Walk-In Counseling Center [facility] -[phone]   COPE 24/7 [place] Mobile Team -[phone] (adults)/[phone] (child)  CHILD: [facility] needs assessment team - [phone]      [facility]:   [facility] - [phone]   Walk-in counseling [facility] - [phone]   Walk-in counseling [facility] - [phone]   Crisis Residence [facility] - [phone]  Urgent Care Adult Mental [phone] mobile unit/ 24/7 crisis line    National Crisis Numbers:   National Suicide Prevention Lifeline: 9-363-244-TALK (277-479-9807)  Poison Control Center - 1-679.921.2446  [employer]/resources for a list of additional resources (SOS)  Trans Lifeline a hotline for transgender people 1-310.829.3816  The [employer] a hotline for LGBT youth 1-364.931.8686  Crisis Text Line: For any crisis 24/7   To: 807520  see www.crisistextline.org  - IF MAKING A CALL FEELS TOO HARD, send a text!         Again thank you for choosing [facility] and please let us know how we can best partner with you to improve you and your family's health.    You may be receiving a survey regarding this appointment. We would love to have your feedback, both positive and negative. The survey is done by an external company, so your answers are anonymous.

## 2021-06-30 NOTE — CONFIDENTIAL NOTE
Has appt with me in two days, will discuss antoinette tmoving forward his anxiety tx will be managed by his psychiatrist.    Ruperto Resendez PA-C

## 2021-06-30 NOTE — TELEPHONE ENCOUNTER
Tried to call patient in response to his [employer] message requesting a call as he was having a panic attack. He did not . Left  requesting a return call. Provided clinic number. Writer will also send a response to his [employer] message.     Discussed with Dr. Sage regarding plan for panic attacks. He discussed this with patient at his appointment yesterday, and the plan was to trial quetiapine 25-50 mg daily as needed. Patient can start with 25 mg and repeat in one hour if no or incomplete relief.     DBT providers Les can be referred to: (these places are within 25-30 minutes from him)      DBT & EMDR Specialist   [facility]   [address], [address]   [place], [place] [address] [phone]   [email or web address]     [facility]   [address], [address]   [place] [address]   [phone]   [email or web address]     [facility]   [address], [address]   [facility] [place] [facility] [id number] [phone]   [email or web address]

## 2021-07-01 ENCOUNTER — TELEPHONE (OUTPATIENT)
Dept: PSYCHIATRY | Facility: CLINIC | Age: 56
End: 2021-07-01

## 2021-07-01 ENCOUNTER — HOSPITAL ENCOUNTER (OUTPATIENT)
Dept: BEHAVIORAL HEALTH | Facility: CLINIC | Age: 56
End: 2021-07-01
Attending: PSYCHIATRY & NEUROLOGY
Payer: COMMERCIAL

## 2021-07-01 DIAGNOSIS — Z79.899 ENCOUNTER FOR LONG-TERM CURRENT USE OF MEDICATION: Primary | ICD-10-CM

## 2021-07-01 PROCEDURE — 90853 GROUP PSYCHOTHERAPY: CPT | Mod: GT | Performed by: SOCIAL WORKER

## 2021-07-01 PROCEDURE — 90853 GROUP PSYCHOTHERAPY: CPT | Mod: 95 | Performed by: COUNSELOR

## 2021-07-01 PROCEDURE — 90853 GROUP PSYCHOTHERAPY: CPT | Mod: GT | Performed by: MARRIAGE & FAMILY THERAPIST

## 2021-07-01 NOTE — GROUP NOTE
Psychotherapy Group Note    PATIENT'S NAME: Jailene Breen  MRN:   [id number]  :   1965  ACCT. NUMBER: [id number]  DATE OF SERVICE: 21  START TIME:  9:00 AM  END TIME:  9:50 AM  FACILITATOR: Devin Sahu LMFT  TOPIC: MH EBP Group: Specialty Awareness  [facility] Adult Mental Health Day Treatment  TRACK: 2A    NUMBER OF PARTICIPANTS: 7                                      Service Modality:  Video Visit     Telemedicine Visit: The patient's condition can be safely assessed and treated via synchronous audio and visual telemedicine encounter.      Reason for Telemedicine Visit: Services only offered telehealth    Originating Site (Patient Location): Patient's home    Distant Site (Provider Location): Provider Remote Setting- Home Office    Consent:  The patient/guardian has verbally consented to: the potential risks and benefits of telemedicine (video visit) versus in person care; bill my insurance or make self-payment for services provided; and responsibility for payment of non-covered services.     Patient would like the video invitation sent by:  My Chart    Mode of Communication:  Video Conference via Medical Zoom    As the provider I attest to compliance with applicable laws and regulations related to telemedicine.         Summary of Group / Topics Discussed:  Specialty Topics: Hope: The topic of hope was presented in order to help patients better understand the symptoms of hopelessness and how to become more hopeful. Patients discussed their current awareness of the topic and relevance to their functioning. Individual experiences with symptoms and treatment options were also discussed. Patients explored options for ongoing/future treatment and symptom management.      Patient Session Goals / Objectives:    Discussed definition of hopelessness    Discussed how hopelessness impacts functioning    Set a plan to utilize skills to reduce hopelessness        Patient Participation /  Response:  Moderately participated, sharing some personal reflections / insights and adequately adequately received / provided feedback with other participants.    Demonstrated understanding of topics discussed through group discussion and participation    Treatment Plan:  Patient has a current master individualized treatment plan.  See Epic treatment plan for more information.    Karlos Sahu LMFT

## 2021-07-01 NOTE — TELEPHONE ENCOUNTER
----- Message from Richard Breyen Coffin, MD sent at 6/30/2021  7:44 AM CDT -----  I was hoping that you could help get Les sent a home BP monitoring cuff prescription. I placed and signed an order during yesterday's visit.   Thanks,  Slim

## 2021-07-01 NOTE — GROUP NOTE
"Process Group Note    PATIENT'S NAME: Jailene Breen  MRN:   [id number]  :   1965  ACCT. NUMBER: [id number]  DATE OF SERVICE: 21  START TIME: 10:00 AM  END TIME: 10:50 AM  FACILITATOR: Vale Pepe LPCC  TOPIC:  Process Group    Diagnoses:  Major Depressive Disorder, recurrent, moderate  JEFF  Panic Disorder    [facility] Day Treatment  TRACK: 2A    NUMBER OF PARTICIPANTS: 7                                      Service Modality:  Video Visit     Telemedicine Visit: The patient's condition can be safely assessed and treated via synchronous audio and visual telemedicine encounter.      Reason for Telemedicine Visit: Services only offered telehealth and due to COVID-19    Originating Site (Patient Location): Patient's home    Distant Site (Provider Location): Provider Remote Setting- Home Office    Consent:  The patient/guardian has verbally consented to: the potential risks and benefits of telemedicine (video visit) versus in person care; bill my insurance or make self-payment for services provided; and responsibility for payment of non-covered services.     Patient would like the video invitation sent by:  My Chart    Mode of Communication:  Video Conference via Medical Zoom    As the provider I attest to compliance with applicable laws and regulations related to telemedicine.                Data:    Session content: At the start of this group, patients were invited to check in by identifying themselves, describing their current emotional status, and identifying issues to address in this group.   Area(s) of treatment focus addressed in this session included Symptom Management, Personal Safety and Community Resources/Discharge Planning.    Patient reported feeling \"not too bad.\" Patient reported yesterday was \"really bad with panic attacks.\" Patient noted heart racing and attempted to utilize coping skills. Patient reported he experienced muffled hearing and was able to " "hear his heart beat \"more\" and that caused more anxiety. Patient reported the muffled ears has been for the last few days. Patient reported feeling grateful that he isn't going through the same thing today that he was experiencing. Patient reported no safety concerns. Patient reported difficulty talking to people yesterday with the muffled sound. Patient expressed feeling proud of showing up to group after a difficult day.     Therapeutic Interventions/Treatment Strategies:  Psychotherapist offered support, feedback and validation and reinforced use of skills. Treatment modalities used include Motivational Interviewing and Cognitive Behavioral Therapy. Interventions include Coping Skills: Assisted patient in identifying 1-2 healthy distraction skills to reduce overall distress, Symptoms Management: Promoted understanding of their diagnoses and how it impacts their functioning and Emotions Management:  Discussed barriers to emotional regulation.    Assessment:    Patient response:   Patient responded to session by accepting feedback, giving feedback, listening, focusing on goals, being attentive and accepting support    Possible barriers to participation / learning include: and no barriers identified    Health Issues:   None reported       Substance Use Review:   Substance Use: No active concerns identified.    Mental Status/Behavioral Observations  Appearance:   Appropriate   Eye Contact:   Good   Psychomotor Behavior: Normal   Attitude:   Cooperative   Orientation:   All  Speech   Rate / Production: Normal/ Responsive Normal    Volume:  Normal   Mood:    Anxious  Depressed  Normal  Affect:    Appropriate   Thought Content:   Clear and Safety denies any current safety concerns including suicidal ideation, self-harm, and homicidal ideation  Thought Form:  Coherent  Logical     Insight:    Good     Plan:     Safety Plan: No current safety concerns identified.  Recommended that patient call 911 or go to the local ED " should there be a change in any of these risk factors.     Barriers to treatment: None identified    Patient Contracts (see media tab):  None    Substance Use: Provided encouragement towards sobriety     Continue or Discharge: Patient will continue in Adult Day Treatment (ADT)  as planned. Patient is likely to benefit from learning and using skills as they work toward the goals identified in their treatment plan.      Vale Pepe, [facility]  July 1, 2021

## 2021-07-01 NOTE — GROUP NOTE
Psychoeducation Group Note    PATIENT'S NAME: Jailene Breen  MRN:   [id number]  :   1965  ACCT. NUMBER: [id number]  DATE OF SERVICE: 21  START TIME: 11:00 AM  END TIME: 11:50 AM  FACILITATOR: Liliya Oh LICSW  TOPIC: ALTAF RN Group: Health Maintenance  [facility] Adult Mental Health Day Treatment  TRACK: 2A    NUMBER OF PARTICIPANTS: 7    Summary of Group / Topics Discussed:  Health Maintenance: Weekend planning: Patients were given time to complete a weekend plan of what they will do to promote wellness and sobriety over the weekend when they do not have the structure of group. Patients were encouraged to review progress on their treatment goals and were challenged to identify ways to work toward meeting them. Patients identified and discussed foreseeable barriers to success over the weekend and then developed a plan to overcome them. Patients reviewed their distress coping skills and social support network and discussed this with the group.       Patient Session Goals / Objectives:    ?    Identified activities to engage in that promote balance in wellness  ?    Distinguished possible barriers to success over the weekend and created a plan to overcome them  ?    Listed distress coping skills and identified social support network to utilize if in crisis during the weekend                                      Service Modality:  Video Visit     Telemedicine Visit: The patient's condition can be safely assessed and treated via synchronous audio and visual telemedicine encounter.      Reason for Telemedicine Visit: Services only offered telehealth    Originating Site (Patient Location): Patient's home    Distant Site (Provider Location): Provider Remote Setting- Home Office    Consent:  The patient/guardian has verbally consented to: the potential risks and benefits of telemedicine (video visit) versus in person care; bill my insurance or make self-payment for services provided; and  responsibility for payment of non-covered services.     Patient would like the video invitation sent by:  My Chart    Mode of Communication:  Video Conference via Medical Zoom    As the provider I attest to compliance with applicable laws and regulations related to telemedicine.           Patient Participation / Response:  Fully participated with the group by sharing personal reflections / insights and openly received / provided feedback with other participants.    Verbalized understanding of health maintenance topic    Treatment Plan:  Patient has a current master individualized treatment plan.  See Epic treatment plan for more information.    Liliya Oh, WALLYSW

## 2021-07-01 NOTE — TELEPHONE ENCOUNTER
Writer was unable to print existing order entered by provider. Writer cancelled and reordered the Blood Pressure Monitoring cuff. The DME Order was mailed to the patient at listed address via [employer].Poonam Powell LPN

## 2021-07-05 ENCOUNTER — HOSPITAL ENCOUNTER (OUTPATIENT)
Dept: BEHAVIORAL HEALTH | Facility: CLINIC | Age: 56
End: 2021-07-05
Attending: PSYCHIATRY & NEUROLOGY
Payer: COMMERCIAL

## 2021-07-05 PROCEDURE — 90853 GROUP PSYCHOTHERAPY: CPT | Mod: 95 | Performed by: PSYCHOLOGIST

## 2021-07-05 PROCEDURE — 90853 GROUP PSYCHOTHERAPY: CPT | Mod: GT | Performed by: PSYCHOLOGIST

## 2021-07-05 PROCEDURE — 90853 GROUP PSYCHOTHERAPY: CPT | Mod: 95 | Performed by: SOCIAL WORKER
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
## 2021-07-05 NOTE — GROUP NOTE
Psychotherapy Group Note    PATIENT'S NAME: Jailene Breen  MRN:   [id number]  :   1965  ACCT. NUMBER: [id number]  DATE OF SERVICE: 21  START TIME: 10:00 AM  END TIME: 10:50 AM  FACILITATOR: Fredo Angelo LP  TOPIC:  EBP Group: [facility] Adult Mental Health Day Treatment  TRACK: 2A    NUMBER OF PARTICIPANTS: 4    Telemedicine Visit: The patient's condition can be safely assessed and treated via synchronous audio and visual telemedicine encounter.      Reason for Telemedicine Visit: Services only offered telehealth    Originating Site (Patient Location): Patient's home    Distant Site (Provider Location): Provider Remote Setting- Home Office    Consent:  The patient/guardian has verbally consented to: the potential risks and benefits of telemedicine (video visit) versus in person care; bill my insurance or make self-payment for services provided; and responsibility for payment of non-covered services.     Mode of Communication:  Video Conference via [employer]    As the provider I attest to compliance with applicable laws and regulations related to telemedicine.      Summary of Group / Topics Discussed:  Mindfulness: Mindfulness Experiential: Patients received an overview on what mindfulness is and how mindfulness can benefit general health, mental health symptoms, and stressors. The history of mindfulness, its application to mental health therapies, and key concepts were also discussed. Patients discussed current awareness, knowledge, and practice of mindfulness skills. Patients also discussed barriers to mindfulness practice.    Patient Session Goals / Objectives:    Demonstrated and verbalized understanding of key mindfulness concepts    Identified when/how to use mindfulness skills    Resolved barriers to practicing mindfulness skills    Identified plan to use mindfulness skills in daily life       Patient Participation / Response:  Fully participated with  the group by sharing personal reflections / insights and openly received / provided feedback with other participants.    Demonstrated understanding of topics discussed through group discussion and participation    Treatment Plan:  Patient has a current master individualized treatment plan.  See Epic treatment plan for more information.    Fredo Angelo, LP

## 2021-07-05 NOTE — GROUP NOTE
Psychotherapy Group Note    PATIENT'S NAME: Jailene Breen  MRN:   [id number]  :   1965  ACCT. NUMBER: [id number]  DATE OF SERVICE: 21  START TIME: 11:00 AM  END TIME: 11:50 AM  FACILITATOR: Liliya Oh LICSW  TOPIC: MH EBP Group: Mindfulness  [facility] Adult Mental Health Day Treatment  TRACK: 2A    NUMBER OF PARTICIPANTS: 4    Summary of Group / Topics Discussed:  Mindfulness: Mindfulness Experiential: Patients received an overview on what mindfulness is and how mindfulness can benefit general health, mental health symptoms, and stressors. The history of mindfulness, its application to mental health therapies, and key concepts were also discussed. Patients discussed current awareness, knowledge, and practice of mindfulness skills. Patients also discussed barriers to mindfulness practice.    Patient Session Goals / Objectives:    Demonstrated and verbalized understanding of key mindfulness concepts    Identified when/how to use mindfulness skills    Resolved barriers to practicing mindfulness skills    Identified plan to use mindfulness skills in daily life                                     Service Modality:  Video Visit     Telemedicine Visit: The patient's condition can be safely assessed and treated via synchronous audio and visual telemedicine encounter.      Reason for Telemedicine Visit: Services only offered telehealth    Originating Site (Patient Location): Patient's home    Distant Site (Provider Location): Provider Remote Setting- Home Office    Consent:  The patient/guardian has verbally consented to: the potential risks and benefits of telemedicine (video visit) versus in person care; bill my insurance or make self-payment for services provided; and responsibility for payment of non-covered services.     Patient would like the video invitation sent by:  My Chart    Mode of Communication:  Video Conference via Medical [employer]oom    As the provider I attest to compliance with  applicable laws and regulations related to telemedicine.           Patient Participation / Response:  Fully participated with the group by sharing personal reflections / insights and openly received / provided feedback with other participants.    Identified / Expressed personal readiness to practice mindfulness skills    Treatment Plan:  Patient has a current master individualized treatment plan.  See Epic treatment plan for more information.    Liliya Oh, LICSW

## 2021-07-06 ENCOUNTER — HOSPITAL ENCOUNTER (OUTPATIENT)
Dept: BEHAVIORAL HEALTH | Facility: CLINIC | Age: 56
End: 2021-07-06
Attending: PSYCHIATRY & NEUROLOGY
Payer: COMMERCIAL

## 2021-07-06 PROCEDURE — 90853 GROUP PSYCHOTHERAPY: CPT | Mod: GT | Performed by: PSYCHOLOGIST

## 2021-07-06 PROCEDURE — 90853 GROUP PSYCHOTHERAPY: CPT | Mod: 95 | Performed by: SOCIAL WORKER

## 2021-07-06 PROCEDURE — 90853 GROUP PSYCHOTHERAPY: CPT | Mod: 95 | Performed by: PSYCHOLOGIST

## 2021-07-06 NOTE — GROUP NOTE
Psychotherapy Group Note    PATIENT'S NAME: Jailene Breen  MRN:   [id number]  :   1965  ACCT. NUMBER: [id number]  DATE OF SERVICE: 21  START TIME: 11:00 AM  END TIME: 11:50 AM  FACILITATOR: Liliya Oh LICSW  TOPIC: MH EBP Group: Coping Skills  [facility] Adult Mental Health Day Treatment  TRACK: 2A    NUMBER OF PARTICIPANTS: 6    Summary of Group / Topics Discussed:  Coping Skills: Distraction: Patients learned to mindfully use distraction as a way to decrease heightened stress in the moment.  Patients will identified situations that necessitate healthy distraction strategies.  They explored ways to manage physical symptoms of distress using distraction. The group began to distinguish when this can be useful in their lives or when other strategies would be more relevant or helpful.    Patient Session Goals / Objectives:    Understand the purpose and benefits of using healthy distraction to decrease distress.    Process what happens in the body when using distraction strategies.    Demonstrate understanding of when to use distraction strategies.    Explore patient s current distraction activities, and how to take a more intentional approach to the use of distraction.    Identify and problem solve barriers to applying distraction strategies.    Choose 1-2 healthy distraction strategies to apply during times of distress.                                    Service Modality:  Video Visit     Telemedicine Visit: The patient's condition can be safely assessed and treated via synchronous audio and visual telemedicine encounter.      Reason for Telemedicine Visit: Services only offered telehealth    Originating Site (Patient Location): Patient's home    Distant Site (Provider Location): Provider Remote Setting- Home Office    Consent:  The patient/guardian has verbally consented to: the potential risks and benefits of telemedicine (video visit) versus in person care; bill my insurance or  make self-payment for services provided; and responsibility for payment of non-covered services.     Patient would like the video invitation sent by:  My Chart    Mode of Communication:  Video Conference via Medical Zoom    As the provider I attest to compliance with applicable laws and regulations related to telemedicine.           Patient Participation / Response:  Moderately participated, sharing some personal reflections / insights and adequately adequately received / provided feedback with other participants.    Demonstrated knowledge of when to consider using a variety of coping skills in daily life    Treatment Plan:  Patient has a current master individualized treatment plan.  See Epic treatment plan for more information.    Liliya Oh, WALLYSW

## 2021-07-06 NOTE — GROUP NOTE
Psychotherapy Group Note                                    Service Modality:  Video Visit     Telemedicine Visit: The patient's condition can be safely assessed and treated via synchronous audio and visual telemedicine encounter.      Reason for Telemedicine Visit: Patient has requested telehealth visit, Patient unable to travel, Patient convenience (e.g. access to timely appointments / distance to available provider), Patient lives in a designated Health Professional Shortage Area (HPSA), and Services only offered telehealth    Originating Site (Patient Location): Patient's home    Distant Site (Provider Location): [facility]    Consent:  The patient/guardian has verbally consented to: the potential risks and benefits of telemedicine (video visit) versus in person care; bill my insurance or make self-payment for services provided; and responsibility for payment of non-covered services.     Patient would like the video invitation sent by:  My Chart    Mode of Communication:  Video Conference via Medical Zoom    As the provider I attest to compliance with applicable laws and regulations related to telemedicine.        PATIENT'S NAME: Jailene Breen  MRN:   [id number]  :   1965  ACCT. NUMBER: [id number]  DATE OF SERVICE: 21  START TIME: 10:00 AM  END TIME: 10:50 AM  FACILITATOR: Josefina Beauchamp PsyD  TOPIC:  EBP Group: Coping Skills  [facility] Adult Mental Health Day Treatment  TRACK: 2A    NUMBER OF PARTICIPANTS: 6    Summary of Group / Topics Discussed:  Coping Skills: Grounding: Patients discussed and practiced strategies to increase attachment / presence to the current moment.  Patients identified situations in which using these strategies will help improve emotion regulation sense of calm in the body.  Reviewed the benefits of applying grounding strategies, as well as past / current practices of each member.  Patients identified situations in which  using these strategies would reduce stress. They developed the ability to distinguish when these strategies can be useful in their lives for management and stress and psychological well-being.    Patient Session Goals / Objectives:    Understand the purpose of using grounding strategies to reduce stress.    Verbalize understanding of how and when to apply grounding strategies to reduce distress and increase presence in the moment.    Review patients current grounding practices and discuss a more formal way of practicing and accessing skills.    Practice using various calming strategies (e.g. 5-4-3-2-1; mental and body awareness).    Choose 1-2 grounding strategies to apply during times of distress.        Patient Participation / Response:  Fully participated with the group by sharing personal reflections / insights and openly received / provided feedback with other participants.    Identified barriers to applying coping skills    Treatment Plan:  Patient has a current master individualized treatment plan.  See Epic treatment plan for more information.    Alisia Woodward Psy., D,  Licensed Clinical Psychologist

## 2021-07-06 NOTE — GROUP NOTE
Process Group Note                                    Service Modality:  Video Visit     Telemedicine Visit: The patient's condition can be safely assessed and treated via synchronous audio and visual telemedicine encounter.      Reason for Telemedicine Visit: Patient has requested telehealth visit, Patient unable to travel, Patient convenience (e.g. access to timely appointments / distance to available provider), Patient lives in a designated Health Professional Shortage Area (HPSA), and Services only offered telehealth    Originating Site (Patient Location): Patient's home    Distant Site (Provider Location): [facility]: [facility], [facility]    Consent:  The patient/guardian has verbally consented to: the potential risks and benefits of telemedicine (video visit) versus in person care; bill my insurance or make self-payment for services provided; and responsibility for payment of non-covered services.     Patient would like the video invitation sent by:  My Chart    Mode of Communication:  Video Conference via Medical Zoom    As the provider I attest to compliance with applicable laws and regulations related to telemedicine.        PATIENT'S NAME: Jailene Breen  MRN:   [id number]  :   1965  ACCT. NUMBER: [id number]  DATE OF SERVICE: 21  START TIME:  9:00 AM  END TIME:  9:50 AM  FACILITATOR: Josefina Beauchamp PsyD  TOPIC:  Process Group    Diagnoses:    Major Depressive Disorder, recurrent, moderate  JEFF  Panic Disorder    Rheumatoid Arthritis    Fredo Hughes MD MD Orthopedics [address]  21   Phone: [phone]; Fax: [phone]      Aiden Resendez PA  Assigned PCP  2020   Namita Sharma MD Resident Student in organized health care education/training program 2020 End  20   Phone: [phone]; Fax: [phone]      Barbie Christie, PhD LP Psychologist Licensed Mental Health 2020 [address]  20   Phone:  [phone]; Fax: [phone]      Comment: supervising      Susana Pike APRN CNP Referring Physician Nurse Practitioner 08/18/2020 End  8/18/20   Phone: [phone]; Fax: [phone]      Raghu Parada MD  Assigned Rheumatology Provider  10/23/2020   10/28/20            Chanelle Solo MD MD Psychiatry 02/24/2021 End  2/24/21   Phone: [phone]; Fax: [phone]      Comment: Attending      Coffin, Richard Breyen, MD Resident Psychiatry 02/24/2021 End  2/24/21   Phone: [phone]; Fax: [phone]            [facility] Mental Health Day Treatment  TRACK: 2A    NUMBER OF PARTICIPANTS: 6          Data:    Session content: At the start of this group, patients were invited to check in by identifying themselves, describing their current emotional status, and identifying issues to address in this group.   Area(s) of treatment focus addressed in this session included Symptom Management, Personal Safety and Community Resources/Discharge Planning.  Client reported being safe today.  Reported mood is depressed.     Goal for today is to attend therapy groups.  The client talked to the group about how he wishes that he was part of work and realizes that he can't do that right now with his pain level. He also talked about how the 100 mg of Seroquel in the morning makes him feel groggy and the group asked him to talk to the clinic/doctor about moving that to night time.      Therapeutic Interventions/Treatment Strategies:  Psychotherapist reinforced use of skills. Treatment modalities used include Cognitive Behavioral Therapy and Dialectical Behavioral Therapy. Interventions include Coping Skills: Discussed meditation skills and addressed ways to implement meditation skills , Relapse Prevention: Facilitated understanding of effective coping skills in response to triggers for substance use, Mindfulness: Facilitated discussion of when/how to use mindfulness skills to benefit general health,  mental health symptoms, and stressors and Emotions Management:  Discussed barriers to emotional regulation.    Assessment:    Patient response:   Patient responded to session by focusing on goals, accepting support and appearing alert    Possible barriers to participation / learning include: severity of symptoms    Health Issues:   Yes: Pain, Associated Psychological Distress       Substance Use Review:   Substance Use: No active concerns identified.    Mental Status/Behavioral Observations  Appearance:   Appropriate   Eye Contact:   Good   Psychomotor Behavior: Normal   Attitude:   Cooperative   Orientation:   All  Speech   Rate / Production: Normal    Volume:  Normal   Mood:    Anxious  Depressed  Sad   Affect:    Constricted   Thought Content:   Rumination  Thought Form:  Coherent  Logical     Insight:    Good     Plan:     Safety Plan: Recommended that patient call 911 or go to the local ED should there be a change in any of these risk factors.     Barriers to treatment: None identified    Patient Contracts (see media tab):  None    Substance Use: Provided encouragement towards sobriety     Continue or Discharge: Patient will continue in Adult Day Treatment (ADT)  as planned. Patient is likely to benefit from learning and using skills as they work toward the goals identified in their treatment plan.    Juan Blake., D,  Licensed Clinical Psychologist    Josefina Beauchamp PsyD  July 6, 2021

## 2021-07-07 ENCOUNTER — MYC MEDICAL ADVICE (OUTPATIENT)
Dept: PALLIATIVE MEDICINE | Facility: CLINIC | Age: 56
End: 2021-07-07

## 2021-07-07 DIAGNOSIS — M45.7 ANKYLOSING SPONDYLITIS OF LUMBOSACRAL REGION (H): ICD-10-CM

## 2021-07-07 DIAGNOSIS — M51.369 DDD (DEGENERATIVE DISC DISEASE), LUMBAR: ICD-10-CM

## 2021-07-07 DIAGNOSIS — M54.59 LUMBAR FACET JOINT PAIN: ICD-10-CM

## 2021-07-07 DIAGNOSIS — M05.79 RHEUMATOID ARTHRITIS INVOLVING MULTIPLE SITES WITH POSITIVE RHEUMATOID FACTOR (H): ICD-10-CM

## 2021-07-07 DIAGNOSIS — G89.29 CHRONIC BILATERAL LOW BACK PAIN WITH RIGHT-SIDED SCIATICA: ICD-10-CM

## 2021-07-07 DIAGNOSIS — F11.20 UNCOMPLICATED OPIOID DEPENDENCE (H): ICD-10-CM

## 2021-07-07 DIAGNOSIS — M25.551 HIP PAIN, RIGHT: ICD-10-CM

## 2021-07-07 DIAGNOSIS — M54.41 CHRONIC BILATERAL LOW BACK PAIN WITH RIGHT-SIDED SCIATICA: ICD-10-CM

## 2021-07-08 ENCOUNTER — HOSPITAL ENCOUNTER (OUTPATIENT)
Dept: BEHAVIORAL HEALTH | Facility: CLINIC | Age: 56
End: 2021-07-08
Attending: PSYCHIATRY & NEUROLOGY
Payer: COMMERCIAL

## 2021-07-08 DIAGNOSIS — M06.9 RHEUMATOID ARTHRITIS INVOLVING MULTIPLE SITES, UNSPECIFIED WHETHER RHEUMATOID FACTOR PRESENT (H): ICD-10-CM

## 2021-07-08 PROCEDURE — 90853 GROUP PSYCHOTHERAPY: CPT | Mod: GT | Performed by: PSYCHOLOGIST

## 2021-07-08 PROCEDURE — 90853 GROUP PSYCHOTHERAPY: CPT | Mod: GT | Performed by: SOCIAL WORKER

## 2021-07-08 NOTE — GROUP NOTE
Process Group Note                                    Service Modality:  Video Visit     Telemedicine Visit: The patient's condition can be safely assessed and treated via synchronous audio and visual telemedicine encounter.      Reason for Telemedicine Visit: Patient has requested telehealth visit, Patient unable to travel, Patient convenience (e.g. access to timely appointments / distance to available provider), Patient lives in a designated Health Professional Shortage Area (HPSA), and Services only offered telehealth    Originating Site (Patient Location): Patient's home    Distant Site (Provider Location): [facility]: [facility], [facility]    Consent:  The patient/guardian has verbally consented to: the potential risks and benefits of telemedicine (video visit) versus in person care; bill my insurance or make self-payment for services provided; and responsibility for payment of non-covered services.     Patient would like the video invitation sent by:  My Chart    Mode of Communication:  Video Conference via Medical Zoom    As the provider I attest to compliance with applicable laws and regulations related to telemedicine.        PATIENT'S NAME: Jailene Breen  MRN:   [id number]  :   1965  ACCT. NUMBER: [id number]  DATE OF SERVICE: 21  START TIME:  9:00 AM  END TIME:  9:50 AM  FACILITATOR: Josefina Beauchamp PsyD  TOPIC:  Process Group    Diagnoses:    Major Depressive Disorder, recurrent, moderate  JEFF  Panic Disorder    Rheumatoid Arthritis    Fredo Hughes MD MD Orthopedics 2016 End  21   Phone: [phone]; Fax: [phone]      Aiden Resendez PA  Assigned PCP  2020   Namita Sharma MD Resident Student in organized health care education/training program 2020 End  20   Phone: [phone]; Fax: [phone]      Barbie Christie, PhD LP Psychologist Licensed Mental Health 2020 End  20   Phone:  [phone]; Fax: [phone]      Comment: supervising      Susana Pike APRN CNP Referring Physician Nurse Practitioner 08/18/2020 End  8/18/20   Phone: [phone]; Fax: [phone]      Raghu Parada MD  Assigned Rheumatology Provider  10/23/2020   10/28/20            Chanelle Solo MD MD Psychiatry 02/24/2021 End  2/24/21   Phone: [phone]; Fax: [phone]      Comment: Attending      Coffin, Richard Breyen, MD Resident Psychiatry 02/24/2021 End  2/24/21   Phone: [phone]; Fax: [phone]            [facility] Mental Health Day Treatment  TRACK: 2A    NUMBER OF PARTICIPANTS: 7          Data:    Session content: At the start of this group, patients were invited to check in by identifying themselves, describing their current emotional status, and identifying issues to address in this group.   Area(s) of treatment focus addressed in this session included Symptom Management, Personal Safety and Community Resources/Discharge Planning.  Client reported being safe today.  Reported mood is     Goal for today is to  The client talked to the group about how he feels grief and loss about his son, age 27 years old and is moving to [place] with his girlfriend. He talked about his pain and his depression level with the group. The group validated that he was a good dad for his son, helping him in many ways. He reported that he is studying psychology classes.      Therapeutic Interventions/Treatment Strategies:  Psychotherapist reinforced use of skills. Treatment modalities used include Cognitive Behavioral Therapy and Dialectical Behavioral Therapy. Interventions include Cognitive Restructuring:  Facilitated recognition of the connection between negative thoughts and negative core beliefs, Relapse Prevention: Assisted patient in identifying personal vulnerabilities, thoughts, emotions, and situations that may lead to relapse , Mindfulness: Encouraged a plan to use mindfulness skills in  daily life and Symptoms Management: Promoted understanding of their diagnoses and how it impacts their functioning.    Assessment:    Patient response:   Patient responded to session by listening, being attentive and accepting support    Possible barriers to participation / learning include: severity of symptoms    Health Issues:   Yes: Pain, Associated Psychological Distress       Substance Use Review:   Substance Use: No active concerns identified.    Mental Status/Behavioral Observations  Appearance:   Appropriate   Eye Contact:   Good   Psychomotor Behavior: Normal   Attitude:   Cooperative   Orientation:   All  Speech   Rate / Production: Normal    Volume:  Soft   Mood:    Depressed  Sad   Affect:    Constricted   Thought Content:   Rumination  Thought Form:  Coherent  Logical     Insight:    Good     Plan:     Safety Plan: Recommended that patient call 911 or go to the local ED should there be a change in any of these risk factors.     Barriers to treatment: None identified    Patient Contracts (see media tab):  None    Substance Use: Provided encouragement towards sobriety     Continue or Discharge: Patient will continue in Adult Day Treatment (ADT)  as planned. Patient is likely to benefit from learning and using skills as they work toward the goals identified in their treatment plan.      Josefina Beauchamp PsyD  July 8, 2021  Elpidio Blake D,  Licensed Clinical Psychologist

## 2021-07-08 NOTE — GROUP NOTE
Psychotherapy Group Note                                    Service Modality:  Video Visit     Telemedicine Visit: The patient's condition can be safely assessed and treated via synchronous audio and visual telemedicine encounter.      Reason for Telemedicine Visit: Patient has requested telehealth visit, Patient unable to travel, Patient convenience (e.g. access to timely appointments / distance to available provider), Patient lives in a designated Health Professional Shortage Area (HPSA), and Services only offered telehealth    Originating Site (Patient Location): Patient's home    Distant Site (Provider Location): [facility]: [facility], [facility]    Consent:  The patient/guardian has verbally consented to: the potential risks and benefits of telemedicine (video visit) versus in person care; bill my insurance or make self-payment for services provided; and responsibility for payment of non-covered services.     Patient would like the video invitation sent by:  My Chart    Mode of Communication:  Video Conference via Medical Zoom    As the provider I attest to compliance with applicable laws and regulations related to telemedicine.        PATIENT'S NAME: Jailene Breen  MRN:   [id number]  :   1965  ACCT. NUMBER: [id number]  DATE OF SERVICE: 21  START TIME: 10:00 AM  END TIME: 10:50 AM  FACILITATOR: Josefina Beauchamp PsyD  TOPIC:  EBP Group: Coping Skills  [facility] Adult Mental Health Day Treatment  TRACK: 2A    NUMBER OF PARTICIPANTS: 7    Summary of Group / Topics Discussed:  Coping Skills: Meditation: Patients learned about meditation and explored how and when to utilize it to increase focus, reduce mental health symptoms, decrease physical tension, and improve mental well-being.  Approaches to meditation were presented as a means of increasing self-awareness. The benefits of various meditation practices were discussed, as well as barriers to utilization of this coping  strategy.     Patient Session Goals / Objectives:    Understand the purpose and efficacy of using meditation modalities to reduce stress / symptoms.    Review / discuss situations in daily life that cause distress, where establishing a meditation routine or meditating as needed may improve functioning.      Verbalize understanding of how and when to apply grounding strategies to reduce distress and increase presence in the moment.    Practice meditation and address barriers to use in daily life.        Patient Participation / Response:  Fully participated with the group by sharing personal reflections / insights and openly received / provided feedback with other participants.    Identified barriers to applying coping skills    Treatment Plan:  Patient has a current master individualized treatment plan.  See Epic treatment plan for more information.    Alisia Woodward Psy., D,  Licensed Clinical Psychologist

## 2021-07-08 NOTE — GROUP NOTE
Psychotherapy Group Note    PATIENT'S NAME: Jailene Breen  MRN:   [id number]  :   1965  ACCT. NUMBER: [id number]  DATE OF SERVICE: 21  START TIME: 11:00 AM  END TIME: 11:50 AM  FACILITATOR: Liliya Oh LICSW  TOPIC: MH EBP Group: Coping Skills  [facility] Adult Mental Health Day Treatment  TRACK: 2A    NUMBER OF PARTICIPANTS: 7    Summary of Group / Topics Discussed:  Coping Skills: Improve the Moment: Patients learned to tolerate distress, by applying strategies to effect positive change in the present moment.  Patients will identified situations where they would benefit from applying strategies to improve the moment and reduce distress. Patients discussed how to distinguish when this can be useful in their lives or when other strategies would be more relevant or helpful.    Patient Session Goals / Objectives:    Discuss how the use of intentional  in the moment  actions can help reduce distress.    Review patients current practices and discuss a more formal way of practicing and accessing skills.    Increase ability to decide when to use improve the moment strategies    Choose 1-2 in the moment actions to apply during times of distress.                                    Service Modality:  Video Visit     Telemedicine Visit: The patient's condition can be safely assessed and treated via synchronous audio and visual telemedicine encounter.      Reason for Telemedicine Visit: Services only offered telehealth    Originating Site (Patient Location): Patient's home    Distant Site (Provider Location): Provider Remote Setting- Home Office    Consent:  The patient/guardian has verbally consented to: the potential risks and benefits of telemedicine (video visit) versus in person care; bill my insurance or make self-payment for services provided; and responsibility for payment of non-covered services.     Patient would like the video invitation sent by:  My Chart    Mode of Communication:   Video Conference via Medical Zoom    As the provider I attest to compliance with applicable laws and regulations related to telemedicine.           Patient Participation / Response:  Fully participated with the group by sharing personal reflections / insights and openly received / provided feedback with other participants.    Demonstrated knowledge of when to consider using a variety of coping skills in daily life    Treatment Plan:  Patient has a current master individualized treatment plan.  See Epic treatment plan for more information.    Liliya Oh, WALLYSW

## 2021-07-09 RX ORDER — BUPRENORPHINE AND NALOXONE 8; 2 MG/1; MG/1
FILM, SOLUBLE BUCCAL; SUBLINGUAL
Qty: 60 EACH | Refills: 0 | Status: SHIPPED | OUTPATIENT
Start: 2021-07-09 | End: 2021-08-09

## 2021-07-09 NOTE — TELEPHONE ENCOUNTER
From: Jamison Breen      Created: 7/7/2021 7:13 PM        *-*-*This message has not been handled.*-*-*    Hi, could you please refill my suboxone and send to [facility] in [place],  thank you.  Also can we do a video visit next I'm having car issues.        Will forward to MA and nurse pool to process    Josias Lopez RN  Patient Care Supervisor   [facility] [place]

## 2021-07-09 NOTE — TELEPHONE ENCOUNTER
Signed Prescriptions:                        Disp   Refills    buprenorphine HCl-naloxone HCl (SUBOXONE) *60 each0        Sig: Use 0.5 film under the tongue 4 times per day for           pain, approximately every 6 hours. Max 2 films           per day. Fill 7/9/21 and begin 7/9/2021.  30 day           supply  Authorizing Provider: SUSANA PETTY    Reviewed [place]  July 9, 2021- no concerning fills.  Patient should call [phone] to schedule a follow up appt with me. This can be in-person or video. It might be nice to have an hour slot if Les feels we need more time than 30 minutes.   Susana GRANT, RN CNP, FNP  [facility]  [facility]

## 2021-07-09 NOTE — TELEPHONE ENCOUNTER
Mychart message sent with Rx approval from provider, and the need for appointment with provider  [facility]

## 2021-07-09 NOTE — TELEPHONE ENCOUNTER
Received call from patient requesting refill(s) of buprenorphine HCl-naloxone HCl (SUBOXONE) 8-2 MG per film    Last dispensed from pharmacy on 06/02/21    Patient's last office/virtual visit by prescribing provider on 04/21/21  Next office/virtual appointment scheduled for : none    Last urine drug screen date 06/15/21  Current opioid agreement on file (completed within the last year) No Date of opioid agreement: 01/21/20    E-prescribe to [id number] - [place] - [address]     Will route to nursing Rumsey for review and preparation of prescription(s).

## 2021-07-09 NOTE — TELEPHONE ENCOUNTER
Medication refill information reviewed.     Due date for buprenorphine HCl-naloxone HCl (SUBOXONE) 8-2 MG per film is 7/9/21     Prescriptions prepped for review.     Will route to provider.     Josias Lopez RN  Patient Care Supervisor   [facility] [place]

## 2021-07-12 ENCOUNTER — HOSPITAL ENCOUNTER (OUTPATIENT)
Dept: BEHAVIORAL HEALTH | Facility: CLINIC | Age: 56
End: 2021-07-12
Attending: PSYCHIATRY & NEUROLOGY
Payer: COMMERCIAL

## 2021-07-12 PROCEDURE — 90853 GROUP PSYCHOTHERAPY: CPT | Mod: GT | Performed by: SOCIAL WORKER

## 2021-07-12 PROCEDURE — 90853 GROUP PSYCHOTHERAPY: CPT | Mod: 95 | Performed by: PSYCHOLOGIST

## 2021-07-12 RX ORDER — METHOTREXATE 2.5 MG/1
TABLET ORAL
Qty: 48 TABLET | Refills: 0 | Status: SHIPPED | OUTPATIENT
Start: 2021-07-12 | End: 2021-10-15

## 2021-07-12 NOTE — GROUP NOTE
Psychotherapy Group Note                                    Service Modality:  Video Visit     Telemedicine Visit: The patient's condition can be safely assessed and treated via synchronous audio and visual telemedicine encounter.      Reason for Telemedicine Visit: Patient has requested telehealth visit, Patient unable to travel, Patient convenience (e.g. access to timely appointments / distance to available provider), Patient lives in a designated Health Professional Shortage Area (HPSA), and Services only offered telehealth    Originating Site (Patient Location): Patient's home    Distant Site (Provider Location): [facility]: [facility], [facility]    Consent:  The patient/guardian has verbally consented to: the potential risks and benefits of telemedicine (video visit) versus in person care; bill my insurance or make self-payment for services provided; and responsibility for payment of non-covered services.     Patient would like the video invitation sent by:  My Chart    Mode of Communication:  Video Conference via Medical Zoom    As the provider I attest to compliance with applicable laws and regulations related to telemedicine.        PATIENT'S NAME: Jailene Breen  MRN:   [id number]  :   1965  ACCT. NUMBER: [id number]  DATE OF SERVICE: 21  START TIME: 10:00 AM  END TIME: 10:50 AM  FACILITATOR: Josefina Beauchamp PsyD  TOPIC:  EBP Group: Behavioral Activation  [facility] Adult Mental Health Day Treatment  TRACK: 2A    NUMBER OF PARTICIPANTS: 6    Summary of Group / Topics Discussed:  Behavioral Activation: The Change Process - Behavior Change: Patients explored the process and types of change, including but not limited to, theories of change, steps to making change, methods of changing behavior, and potential barriers.  Patients worked to identify what changes may benefit their daily lives, and work towards a plan to implement change.      Patient Session Goals /  Objectives:    Demonstrate understanding of the change process.      Identify positive and negative behavioral patterns.    Make plans to track and implement changes and share experiences in group.    Identify personal barriers to change      Patient Participation / Response:  Moderately participated, sharing some personal reflections / insights and adequately adequately received / provided feedback with other participants.    Demonstrated understanding of topics discussed through group discussion and participation and Expressed understanding of the relationship between behaviors, thoughts, and feelings    Treatment Plan:  Patient has a current master individualized treatment plan.  See Epic treatment plan for more information.    Alisia Woodward Psy., TIRSO,  Licensed Clinical Psychologist

## 2021-07-12 NOTE — GROUP NOTE
Process Group Note                                    Service Modality:  Video Visit     Telemedicine Visit: The patient's condition can be safely assessed and treated via synchronous audio and visual telemedicine encounter.      Reason for Telemedicine Visit: Patient has requested telehealth visit, Patient unable to travel, Patient convenience (e.g. access to timely appointments / distance to available provider), Patient lives in a designated Health Professional Shortage Area (HPSA), and Services only offered telehealth    Originating Site (Patient Location): Patient's home    Distant Site (Provider Location): [facility]: [facility], [facility]    Consent:  The patient/guardian has verbally consented to: the potential risks and benefits of telemedicine (video visit) versus in person care; bill my insurance or make self-payment for services provided; and responsibility for payment of non-covered services.     Patient would like the video invitation sent by:  My Chart    Mode of Communication:  Video Conference via Medical Zoom    As the provider I attest to compliance with applicable laws and regulations related to telemedicine.        PATIENT'S NAME: Jailene Breen  MRN:   [id number]  :   1965  ACCT. NUMBER: [id number]  DATE OF SERVICE: 21  START TIME:  9:00 AM  END TIME:  9:50 AM  FACILITATOR: Josefina Beauchamp PsyD  TOPIC:  Process Group    Diagnoses:    Major Depressive Disorder, recurrent, moderate  JEFF  Panic Disorder    Rheumatoid Arthritis    Fredo Hughes MD MD Orthopedics 2016 End  21   Phone: [phone]; Fax: [phone]      Aiden Resendez PA  Assigned PCP  2020   Namita Sharma MD Resident Student in organized health care education/training program 2020 End  20   Phone: [phone]; Fax: [phone]      Barbie Christie, PhD LP Psychologist Licensed Mental Health 2020 End  20   Phone:  "[phone]; Fax: [phone]      Comment: supervising      Susana Pike APRN CNP Referring Physician Nurse Practitioner 08/18/2020 End  8/18/20   Phone: [phone]; Fax: [phone]      Raghu Parada MD  Assigned Rheumatology Provider  10/23/2020   10/28/20            Chanelle oSlo MD MD Psychiatry 02/24/2021 End  2/24/21   Phone: [phone]; Fax: [phone]      Comment: Attending      Coffin, Richard Breyen, MD Resident Psychiatry 02/24/2021 End  2/24/21   Phone: [phone]; Fax: [phone]            [facility] Mental Health Day Treatment  TRACK: 2A    NUMBER OF PARTICIPANTS: 7          Data:    Session content: At the start of this group, patients were invited to check in by identifying themselves, describing their current emotional status, and identifying issues to address in this group.   Area(s) of treatment focus addressed in this session included Symptom Management, Personal Safety and Community Resources/Discharge Planning.  Client reported being safe today.  Reported mood is depressed.   Goal for today is to attend therapy groups. The client talked to the group about how he felt groggy this morning from his medications, low motivation, and feels that the Pain Clinic \"has discarded me.\"  He talked to the group about alternatives and noticed that when he plays videos or goes for rides in the car that his pain level is lowered.      Therapeutic Interventions/Treatment Strategies:  Psychotherapist reinforced use of skills. Treatment modalities used include Cognitive Behavioral Therapy and Dialectical Behavioral Therapy. Interventions include Cognitive Restructuring:  Facilitated recognition of the connection between negative thoughts and negative core beliefs, Relapse Prevention: Coached on skills to manage factors that contribute to relapse, Mindfulness: Encouraged a plan to use mindfulness skills in daily life and Symptoms Management: Promoted understanding of " their diagnoses and how it impacts their functioning.    Assessment:    Patient response:   Patient responded to session by focusing on goals, being attentive and appearing alert    Possible barriers to participation / learning include: severity of symptoms    Health Issues:   Yes: Pain, Associated Psychological Distress       Substance Use Review:   Substance Use: No active concerns identified.    Mental Status/Behavioral Observations  Appearance:   Appropriate   Eye Contact:   Good   Psychomotor Behavior: Normal   Attitude:   Cooperative   Orientation:   All  Speech   Rate / Production: Normal    Volume:  Normal   Mood:    Depressed  Sad   Affect:    Constricted   Thought Content:   Rumination  Thought Form:  Coherent  Logical     Insight:    Good     Plan:     Safety Plan: Recommended that patient call 911 or go to the local ED should there be a change in any of these risk factors.     Barriers to treatment: None identified    Patient Contracts (see media tab):  None    Substance Use: Provided encouragement towards sobriety     Continue or Discharge: Patient will continue in Adult Day Treatment (ADT)  as planned. Patient is likely to benefit from learning and using skills as they work toward the goals identified in their treatment plan.      Josefina Beauchamp PsyD  July 12, 2021  Elpidio Blake D,  Licensed Clinical Psychologist

## 2021-07-13 ENCOUNTER — HOSPITAL ENCOUNTER (OUTPATIENT)
Dept: BEHAVIORAL HEALTH | Facility: CLINIC | Age: 56
End: 2021-07-13
Attending: PSYCHIATRY & NEUROLOGY
Payer: COMMERCIAL

## 2021-07-13 PROCEDURE — 90853 GROUP PSYCHOTHERAPY: CPT | Mod: 95 | Performed by: PSYCHOLOGIST

## 2021-07-13 PROCEDURE — 90853 GROUP PSYCHOTHERAPY: CPT | Mod: 95 | Performed by: SOCIAL WORKER

## 2021-07-13 PROCEDURE — 90853 GROUP PSYCHOTHERAPY: CPT | Mod: GT | Performed by: PSYCHOLOGIST

## 2021-07-13 NOTE — GROUP NOTE
Process Group Note                                    Service Modality:  Video Visit     Telemedicine Visit: The patient's condition can be safely assessed and treated via synchronous audio and visual telemedicine encounter.      Reason for Telemedicine Visit: Patient has requested telehealth visit, Patient unable to travel, Patient convenience (e.g. access to timely appointments / distance to available provider), Patient lives in a designated Health Professional Shortage Area (HPSA), and Services only offered telehealth    Originating Site (Patient Location): Patient's home    Distant Site (Provider Location): [facility]: [facility], [facility]    Consent:  The patient/guardian has verbally consented to: the potential risks and benefits of telemedicine (video visit) versus in person care; bill my insurance or make self-payment for services provided; and responsibility for payment of non-covered services.     Patient would like the video invitation sent by:  My Chart    Mode of Communication:  Video Conference via Medical Zoom    As the provider I attest to compliance with applicable laws and regulations related to telemedicine.        PATIENT'S NAME: Jailene Breen  MRN:   [id number]  :   1965  ACCT. NUMBER: [id number]  DATE OF SERVICE: 21  START TIME:  9:00 AM  END TIME:  9:50 AM  FACILITATOR: Josefina Beauchamp PsyD  TOPIC:  Process Group    Diagnoses:    Major Depressive Disorder, recurrent, moderate  JEFF  Panic Disorder    Rheumatoid Arthritis    Fredo Hughes MD MD Orthopedics 2016 End  21   Phone: [phone]; Fax: [phone]      Aiden Resendez PA  Assigned PCP  2020   Namita Sharma MD Resident Student in organized health care education/training program 2020 End  20   Phone: [phone]; Fax: [phone]      Barbie Christie, PhD LP Psychologist Licensed Mental Health 2020 End  20   Phone:  [phone]; Fax: [phone]      Comment: supervising      Susana Pike APRN CNP Referring Physician Nurse Practitioner 08/18/2020 End  8/18/20   Phone: [phone]; Fax: [phone]      Raghu Parada MD  Assigned Rheumatology Provider  10/23/2020   10/28/20            Chanelle Solo MD MD Psychiatry 02/24/2021 End  2/24/21   Phone: [phone]; Fax: [phone]      Comment: Attending      Coffin, Richard Breyen, MD Resident Psychiatry 02/24/2021 End  2/24/21   Phone: [phone]; Fax: [phone]            [facility] Mental Health Day Treatment  TRACK: 2A    NUMBER OF PARTICIPANTS: 7          Data:    Session content: At the start of this group, patients were invited to check in by identifying themselves, describing their current emotional status, and identifying issues to address in this group.   Area(s) of treatment focus addressed in this session included Symptom Management, Personal Safety and Community Resources/Discharge Planning.  Client reported being safe today.  Reported mood is depressed.    Goal for today is to attend therapy groups.  The client talked to the group about how he had negative thoughts about not working and providing for his family. The group reminded him of how important it was to be a dad and spouse for his family and that monetary values can't be placed on that. He talked further about his disability lawsuit and the settlement, and what enjoyable things he could do today.      Therapeutic Interventions/Treatment Strategies:  Psychotherapist reinforced use of skills. Treatment modalities used include Cognitive Behavioral Therapy and Dialectical Behavioral Therapy. Interventions include Cognitive Restructuring:  Facilitated recognition of the connection between negative thoughts and negative core beliefs, Relapse Prevention: Coached on skills to manage factors that contribute to relapse and Mindfulness: Encouraged a plan to use mindfulness skills  in daily life.    Assessment:    Patient response:   Patient responded to session by listening, being attentive and appearing alert    Possible barriers to participation / learning include: severity of symptoms    Health Issues:   Yes: Pain, Associated Psychological Distress       Substance Use Review:   Substance Use: No active concerns identified.    Mental Status/Behavioral Observations  Appearance:   Appropriate   Eye Contact:   Good   Psychomotor Behavior: Normal   Attitude:   Cooperative   Orientation:   All  Speech   Rate / Production: Normal    Volume:  Soft   Mood:    Depressed  Sad   Affect:    Constricted   Thought Content:   Rumination  Thought Form:  Coherent  Logical     Insight:    Good     Plan:     Safety Plan: Recommended that patient call 911 or go to the local ED should there be a change in any of these risk factors.     Barriers to treatment: None identified    Patient Contracts (see media tab):  None    Substance Use: Provided encouragement towards sobriety     Continue or Discharge: Patient will continue in Adult Day Treatment (ADT)  as planned. Patient is likely to benefit from learning and using skills as they work toward the goals identified in their treatment plan.      Josefina Beauchamp PsyD  July 13, 2021  Elpidio Blake D,  Licensed Clinical Psychologist

## 2021-07-13 NOTE — PROGRESS NOTES
Jamison is a 55 year old who is being evaluated via a billable video visit.      How would you like to obtain your AVS? MyChart  If the video visit is dropped, the invitation should be resent by: Send to e-mail at: [email or web address]  Will anyone else be joining your video visit? No      Video Start Time: 9:01 AM    Subjective : Jailene Breen is a 55 year old male with PMH of anxiety, obesity, HLA-B27 positive ankylosing spondylitis evaluated via a billable virtual visit in consultation at request of Dr Resendez for evaluation of joint pains.     He was diagnosed with seropositive rheumatoid arthritis in 2016, established care with Dr. Raygoza in 5/2016 and was started on methotrexate.  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 He was started on Humira in 12/2016 due to AS.  Discontinued methotrexate in 2/2017 due to lack of improvement. Humira was changed to Enbrel in 4/2019 due to lack of improvement.  Methotrexate was restarted in July 2020.     Interim History : In the last couple months he had flare up, it starts in his hands, ankles and then in his shoulders. Can not lift his arm up. Both the shoulders were hurting bad. He is having diarrhea for last couple months. He has started a new antidepressant. He is on Enbrel but has not taken Enbrel for a month due to diarrhea issues.  He thinks that Enbrel helps with his joint pains.  He is on methotrexate 4 tablets once a week but cannot tell if it helps him or not.  He has prednisone tablets and take 20 mg once a month when his symptoms worsens.  He does not like to take prednisone since his anxiety symptoms get worse.    He has lumbar degenerative disc disease and gets MAKAYLA.  He has noticed urinary retention issues sporadically.    July 16, 2021 -for the last few months has been struggling with depression.  He was  admitted few times in the hospital due to acute worsening of depression.  He is following up with his psychiatrist.  Few new medications including Seroquel was started.  He feels his symptoms are gradually getting better.  For the last few days he has noticed a decrease in his appetite.  He has follow-up with his psychiatrist in few weeks and will discuss about that.  He is also on methotrexate 4 tablets once a week and Enbrel 50 subcu once a week for RA.  Overall his RA is stable.  He gets flareups once every 2 months.  Gets flare up he has pain in his hands, wrists, shoulders, feet.      Review of systems negative except for those mentioned above    Reviewed past medical, surgical, family history    Pertinent labs:   Component      Latest Ref Rng & Units 6/16/2021   Sodium      133 - 144 mmol/L 141   Potassium      3.4 - 5.3 mmol/L 4.2   Chloride      94 - 109 mmol/L 109   Carbon Dioxide      20 - 32 mmol/L 28   Anion Gap      3 - 14 mmol/L 4   Glucose      70 - 99 mg/dL 92   Urea Nitrogen      7 - 30 mg/dL 14   Creatinine      0.66 - 1.25 mg/dL 1.09   GFR Estimate      >60 mL/min/1.73:m2 77   GFR Estimate If Black      >60 mL/min/1.73:m2 89   Calcium      8.5 - 10.1 mg/dL 8.2 (L)   Bilirubin Total      0.2 - 1.3 mg/dL 0.9   Albumin      3.4 - 5.0 g/dL 3.2 (L)   Protein Total      6.8 - 8.8 g/dL 6.7 (L)   Alkaline Phosphatase      40 - 150 U/L 88   ALT      0 - 70 U/L 33   AST      0 - 45 U/L 23   WBC      4.0 - 11.0 10e9/L 6.1   RBC Count      4.4 - 5.9 10e12/L 4.98   Hemoglobin      13.3 - 17.7 g/dL 14.6   Hematocrit      40.0 - 53.0 % 42.6   MCV      78 - 100 fl 86   MCH      26.5 - 33.0 pg 29.3   MCHC      31.5 - 36.5 g/dL 34.3   RDW      10.0 - 15.0 % 12.4   Platelet Count      150 - 450 10e9/L 166       Physical exam : Moderately built, P stated age, cooperative  Musculoskeletal: Denies tenderness over MCP, PIP, DIP joints today.  No swelling reported over bilateral wrists.  Reports tenderness over MTP  joints.  No tenderness reported over bilateral ankles.  Tenderness present over the lower back.    Assessment     Seropositive rheumatoid arthritis  History of ankylosing spondylitis based on MRI pelvis ( 6/2016 )  Rheumatoid factor-78, anti-CCP antibody > 340 - 4/2016  HLA-B27 positive  MRI SI joint-June 2016-partial ankylosis of left SI joint    Seropositive rheumatoid arthritis: He has seropositive rheumatoid arthritis (+ RF, + anti-CCP ) manifesting as a polyarticular symmetric arthritis.  He is on Enbrel 50 mg subcu once a week and Methotrexate 4 tab once a week. He thinks Enbrel helps with his joint pains.  Also He started methotrexate in November 2020.  He has not noticed any side effects with its use and has not noticed much improvement.  Overall his energy is stable.  Will get methotrexate monitoring labs every 3 months.  His last CBC, CMP done in 6/2021 was normal.    Ankylosing spondylitis: He was given a diagnosis of ankylosing spondylitis in 2016 after MRI of the SI joints which showed partial ankylosis.  He is HLA-B27 positive.  I will repeat MRI of the SI joint to look for progression or synovitis.  He does follow-up with pain clinic for lower lumbar back pain and gets epidural spine injections.    Depression: He has been suffering from worsening of depression for the last few months.  He is following up with psychiatrist and new antidepressant medication has been added.  He thinks his symptoms are gradually getting better.       Plan     MRI of the SI joint ordered to evaluate for sacroiliitis    Continue methotrexate 4 tablets once a week.   Methotrexate labs to be done every 3 months    Continue Enbrel 50 mg subcu once a week    Follow-up in 3 months in clinic         Video-Visit Details    Type of service:  Video Visit    Video End Time:9:20 AM    Originating Location (pt. Location): Home    Distant Location (provider location):  [facility]     Platform used for Video  Visit: [place]

## 2021-07-13 NOTE — GROUP NOTE
Psychotherapy Group Note                                    Service Modality:  Video Visit     Telemedicine Visit: The patient's condition can be safely assessed and treated via synchronous audio and visual telemedicine encounter.      Reason for Telemedicine Visit: Patient has requested telehealth visit, Patient unable to travel, Patient convenience (e.g. access to timely appointments / distance to available provider), Patient lives in a designated Health Professional Shortage Area (HPSA), and Services only offered telehealth    Originating Site (Patient Location): Patient's home    Distant Site (Provider Location): [facility]: [facility], [facility]    Consent:  The patient/guardian has verbally consented to: the potential risks and benefits of telemedicine (video visit) versus in person care; bill my insurance or make self-payment for services provided; and responsibility for payment of non-covered services.     Patient would like the video invitation sent by:  My Chart    Mode of Communication:  Video Conference via Medical Zoom    As the provider I attest to compliance with applicable laws and regulations related to telemedicine.        PATIENT'S NAME: Jailene Breen  MRN:   [id number]  :   1965  ACCT. NUMBER: [id number]  DATE OF SERVICE: 21  START TIME: 10:00 AM  END TIME: 10:50 AM  FACILITATOR: Josefina Beauchamp PsyD  TOPIC:  EBP Group: Behavioral Activation  [facility] Adult Mental Health Day Treatment  TRACK: 2A    NUMBER OF PARTICIPANTS: 7    Summary of Group / Topics Discussed:  Behavioral Activation: The Change Process - Behavior Change: Patients explored the process and types of change, including but not limited to, theories of change, steps to making change, methods of changing behavior, and potential barriers.  Patients worked to identify what changes may benefit their daily lives, and work towards a plan to implement change.      Patient Session Goals /  Objectives:    Demonstrate understanding of the change process.      Identify positive and negative behavioral patterns.    Make plans to track and implement changes and share experiences in group.    Identify personal barriers to change      Patient Participation / Response:  Fully participated with the group by sharing personal reflections / insights and openly received / provided feedback with other participants.    Expressed understanding of the relationship between behaviors, thoughts, and feelings    Treatment Plan:  Patient has a current master individualized treatment plan.  See Epic treatment plan for more information.    Alsiia Woodward Psy., D,  Licensed Clinical Psychologist

## 2021-07-13 NOTE — GROUP NOTE
Psychotherapy Group Note    PATIENT'S NAME: Jailene Breen  MRN:   [id number]  :   1965  ACCT. NUMBER: [id number]  DATE OF SERVICE: 21  START TIME: 11:00 AM  END TIME: 11:50 AM  FACILITATOR: Liliya Oh LICSW  TOPIC: MH EBP Group: Behavioral Activation  [facility] Adult Mental Health Day Treatment  TRACK: 2A    NUMBER OF PARTICIPANTS: 7    Summary of Group / Topics Discussed:  Behavioral Activation: Activity Scheduling:Patients explored how they currently spend their time, and how specific behaviors impact thoughts and feelings.  The group explored the effect of negative and positive activities on mood states and thought patterns.  Patients identified activities that help to improve mood and thinking patterns, and developed a plan to implement positive activities between sessions.      Patient Session Goals / Objectives:    Identify impact of current behaviors on thoughts and mood    Identify 2-3 behavioral changes that could have a positive impact on thoughts and mood    Prepare to make desired behavioral change: Create a change plan / activity schedule                                    Service Modality:  Video Visit     Telemedicine Visit: The patient's condition can be safely assessed and treated via synchronous audio and visual telemedicine encounter.      Reason for Telemedicine Visit: Services only offered telehealth    Originating Site (Patient Location): Patient's home    Distant Site (Provider Location): Provider Remote Setting- Home Office    Consent:  The patient/guardian has verbally consented to: the potential risks and benefits of telemedicine (video visit) versus in person care; bill my insurance or make self-payment for services provided; and responsibility for payment of non-covered services.     Patient would like the video invitation sent by:  My Chart    Mode of Communication:  Video Conference via Medical Zoom    As the provider I attest to compliance with  applicable laws and regulations related to telemedicine.           Patient Participation / Response:  Fully participated with the group by sharing personal reflections / insights and openly received / provided feedback with other participants.    Expressed understanding of the relationship between behaviors, thoughts, and feelings    Treatment Plan:  Patient has a current master individualized treatment plan.  See Epic treatment plan for more information.    Liliya Oh, LICSW

## 2021-07-14 ENCOUNTER — TELEPHONE (OUTPATIENT)
Dept: PSYCHIATRY | Facility: CLINIC | Age: 56
End: 2021-07-14

## 2021-07-14 NOTE — TELEPHONE ENCOUNTER
"  -----------------------------------------------------------------------------------------------------------  Brief Psychiatry Phone note    Writer called Jailene Breen at this number: [phone]  at 1:33 PM. He reported \"doing okay, a little tired.\" Believes that he will go bed and \"wake up tired\" but improves as the day goes on. Currently, \"not as down\" compared to last appointment. Has used the quetiapine as needed for panic which helps \"when I'm having a really bad one,\" but then \"will feel tired after that.\" Has tried taking before a panic attack but caused too much sedation/tiredness. He will distract himself by playing video games with his son when he begins feeling panic symptoms in the morning, which will help. Discussed attempting to use pill cutter to split pills, though wasn't sure due to small size. No suicidal thoughts, \"mostly feeling a little worthless, but I think that might be the weather.\"    Patient was alert and conversational. Speech was clear with regular rate and rhythm. Thought process was logical and linear. Thought content was negative for safety concerns. Memory and attention were without gross deficit. Insight and judgment intact.      Patient is not currently suicidal or homicidal. he is aware to call 911 or go to the nearest ER if safety concern or urgent symptoms arise.    Slim Sage MD          "

## 2021-07-15 ENCOUNTER — HOSPITAL ENCOUNTER (OUTPATIENT)
Dept: BEHAVIORAL HEALTH | Facility: CLINIC | Age: 56
End: 2021-07-15
Attending: PSYCHIATRY & NEUROLOGY
Payer: COMMERCIAL

## 2021-07-15 PROCEDURE — 90853 GROUP PSYCHOTHERAPY: CPT | Mod: GT | Performed by: SOCIAL WORKER

## 2021-07-15 PROCEDURE — 90853 GROUP PSYCHOTHERAPY: CPT | Mod: GT | Performed by: PSYCHOLOGIST
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
## 2021-07-15 NOTE — GROUP NOTE
Psychotherapy Group Note    PATIENT'S NAME: Jailene Breen  MRN:   [id number]  :   1965  [id number]  DATE OF SERVICE: 7/15/21  START TIME: 11:00 AM  END TIME: 11:50 AM  FACILITATOR: Liliya Oh LICSW  TOPIC: MH EBP Group: Coping Skills  [facility] Adult Mental Health Day Treatment  TRACK: 2A    NUMBER OF PARTICIPANTS: 7    Summary of Group / Topics Discussed:  Coping Skills: Relapse Planning: Patients discussed and increased understanding of how anticipating and planning for possible increased symptoms is a proactive way to reduce the likelihood of relapse.  Patients shared individualized factors that may lead to increased symptoms and decompensation in functioning.  Each patient developed a relapse prevention plan designed to help them recognize when they may have increasing symptoms requiring them to take action and notify their key supports and care team.      Patient Session Goals / Objectives:    Identify and understand what factors may contribute to symptom relapse.    Identify actions that may be taken to manage increased symptoms/stressors.    Create an individualized written relapse plan    Problem solve barriers to plan creation and implementation    Share relapse plan with key support people                                    Service Modality:  Video Visit     Telemedicine Visit: The patient's condition can be safely assessed and treated via synchronous audio and visual telemedicine encounter.      Reason for Telemedicine Visit: Services only offered telehealth    Originating Site (Patient Location): Patient's home    Distant Site (Provider Location): Provider Remote Setting- Home Office    Consent:  The patient/guardian has verbally consented to: the potential risks and benefits of telemedicine (video visit) versus in person care; bill my insurance or make self-payment for services provided; and responsibility for payment of non-covered services.     Patient  would like the video invitation sent by:  My Chart    Mode of Communication:  Video Conference via Medical Zoom    As the provider I attest to compliance with applicable laws and regulations related to telemedicine.           Patient Participation / Response:  Moderately participated, sharing some personal reflections / insights and adequately adequately received / provided feedback with other participants.    Demonstrated knowledge of when to consider using a variety of coping skills in daily life    Treatment Plan:  Patient has a current master individualized treatment plan.  See Epic treatment plan for more information.    Liliya Oh, WALLYSW

## 2021-07-15 NOTE — GROUP NOTE
Psychotherapy Group Note                                    Service Modality:  Video Visit     Telemedicine Visit: The patient's condition can be safely assessed and treated via synchronous audio and visual telemedicine encounter.      Reason for Telemedicine Visit: Patient has requested telehealth visit, Patient unable to travel, Patient convenience (e.g. access to timely appointments / distance to available provider), Patient lives in a designated Health Professional Shortage Area (HPSA), and Services only offered telehealth    Originating Site (Patient Location): Patient's home    Distant Site (Provider Location): [facility]: [facility], [facility]    Consent:  The patient/guardian has verbally consented to: the potential risks and benefits of telemedicine (video visit) versus in person care; bill my insurance or make self-payment for services provided; and responsibility for payment of non-covered services.     Patient would like the video invitation sent by:  My Chart    Mode of Communication:  Video Conference via Medical Zoom    As the provider I attest to compliance with applicable laws and regulations related to telemedicine.        PATIENT'S NAME: Jailene Breen  MRN:   [id number]  :   1965  ACCT. NUMBER: [id number]  DATE OF SERVICE: 7/15/21  START TIME: 10:00 AM  END TIME: 10:50 AM  FACILITATOR: Josefina Beauchamp PsyD  TOPIC:  EBP Group: Coping Skills  [facility] Adult Mental Health Day Treatment  TRACK: 2A    NUMBER OF PARTICIPANTS: 7    Summary of Group / Topics Discussed:  Coping Skills: Building Positive Experiences: Patients discussed the importance of planning and engaging in positive experiences, as strategies to increase positive thinking, hope, and self-worth.  Explored the benefits of planning / creating positive experiences, including recognizing and reducing negativity bias by focusing on and building positive experiences.   Several approaches to building  positive experiences were presented and discussed relevant to each patient.      Patient Session Goals / Objectives:    Understand the purpose of planning / creating / participating / sharing in positive experiences.    Explore patient s experiences related to negative thinking and how it influences activities and moodIdentify current positive events in patient s life.     Set goals to increase a variety of positive experiences.    Address barriers to planning / engaging in positive experiences.        Patient Participation / Response:  Fully participated with the group by sharing personal reflections / insights and openly received / provided feedback with other participants.    Expressed understanding of the relevance / importance of coping skills at distressing times in life    Treatment Plan:  Patient has a current master individualized treatment plan.  See Epic treatment plan for more information.    Alisia Woodward Psy., D,  Licensed Clinical Psychologist

## 2021-07-16 ENCOUNTER — VIRTUAL VISIT (OUTPATIENT)
Dept: RHEUMATOLOGY | Facility: CLINIC | Age: 56
End: 2021-07-16
Payer: COMMERCIAL

## 2021-07-16 DIAGNOSIS — M06.9 RHEUMATOID ARTHRITIS INVOLVING MULTIPLE SITES, UNSPECIFIED WHETHER RHEUMATOID FACTOR PRESENT (H): Primary | ICD-10-CM

## 2021-07-16 PROCEDURE — 99214 OFFICE O/P EST MOD 30 MIN: CPT | Mod: 95 | Performed by: STUDENT IN AN ORGANIZED HEALTH CARE EDUCATION/TRAINING PROGRAM

## 2021-07-16 NOTE — PATIENT INSTRUCTIONS
Continue methotrexate 4 tablets once a week    Continue Enbrel 50 mg subcu once a week    Will get MRI pelvis.  Orders were placed in 12/2020.  Please let me know if orders need to be placed again    Follow-up in 3 months in the clinic.  
22-Sep-2018

## 2021-07-16 NOTE — ADDENDUM NOTE
Encounter addended by: Josefina Beauchamp PsyD on: 7/16/2021 3:25 PM   Actions taken: Flowsheet accepted

## 2021-07-16 NOTE — NURSING NOTE
Called patient to schedule follow up appointment. Left message.   LATOSHA Herring [facility] Rheumatology

## 2021-07-19 ENCOUNTER — HOSPITAL ENCOUNTER (OUTPATIENT)
Dept: BEHAVIORAL HEALTH | Facility: CLINIC | Age: 56
End: 2021-07-19
Attending: PSYCHIATRY & NEUROLOGY
Payer: COMMERCIAL

## 2021-07-19 DIAGNOSIS — F17.200 TOBACCO USE DISORDER: ICD-10-CM

## 2021-07-19 PROCEDURE — 90853 GROUP PSYCHOTHERAPY: CPT | Mod: GT | Performed by: PSYCHOLOGIST

## 2021-07-19 PROCEDURE — 90853 GROUP PSYCHOTHERAPY: CPT | Mod: GT | Performed by: COUNSELOR

## 2021-07-19 NOTE — GROUP NOTE
Process Group Note    PATIENT'S NAME: Jailene Breen  MRN:   [id number]  :   1965  ACCT. NUMBER: [id number]  DATE OF SERVICE: 21  START TIME: 10:00 AM  END TIME: 10:50 AM  FACILITATOR: Vale Pepe LPCC  TOPIC:  Process Group    Diagnoses:  Major Depressive Disorder, recurrent, moderate  JEFF  Panic Disorder    [facility] Day Treatment  TRACK: 2A    NUMBER OF PARTICIPANTS: 8                                      Service Modality:  Video Visit     Telemedicine Visit: The patient's condition can be safely assessed and treated via synchronous audio and visual telemedicine encounter.      Reason for Telemedicine Visit: Services only offered telehealth and due to COVID-19    Originating Site (Patient Location): Patient's home    Distant Site (Provider Location): Provider Remote Setting- Home Office    Consent:  The patient/guardian has verbally consented to: the potential risks and benefits of telemedicine (video visit) versus in person care; bill my insurance or make self-payment for services provided; and responsibility for payment of non-covered services.     Patient would like the video invitation sent by:  My Chart    Mode of Communication:  Video Conference via Medical Zoom    As the provider I attest to compliance with applicable laws and regulations related to telemedicine.                Data:    Session content: At the start of this group, patients were invited to check in by identifying themselves, describing their current emotional status, and identifying issues to address in this group.   Area(s) of treatment focus addressed in this session included Symptom Management, Personal Safety and Community Resources/Discharge Planning.    Patient reported feeling tired for the past two weeks. Patient discussed working toward resting. Patient identified napping as skills they will use to address their goal(s). Patient reported feeling tired and wanting to make sure he  "sleeps tonight may be a barrier to working toward their goal(s) and/or addressing mental health symptoms. Patient reported no safety concerns and/or self-injurious behaviors. Patient reported no substance use. Patient reported they are taking their medications as prescribed. Patient reported feeling proud that they came to group. Patient discussed appreciating the group listening with the treatment group.     Therapeutic Interventions/Treatment Strategies:  Psychotherapist offered support, feedback and validation and reinforced use of skills. Treatment modalities used include Motivational Interviewing and Dialectical Behavioral Therapy. Interventions include Coping Skills: Assisted patient in identifying 1-2 healthy distraction skills to reduce overall distress and Discussed how the use of intentional \"in the moment\" actions can help reduce distress, Symptoms Management: Promoted understanding of their diagnoses and how it impacts their functioning and Emotions Management:  Discussed barriers to emotional regulation.    Assessment:    Patient response:   Patient responded to session by accepting feedback, giving feedback, listening, focusing on goals, being attentive and accepting support    Possible barriers to participation / learning include: and no barriers identified    Health Issues:   None reported       Substance Use Review:   Substance Use: No active concerns identified.    Mental Status/Behavioral Observations  Appearance:   Appropriate   Eye Contact:   Good   Psychomotor Behavior: Normal   Attitude:   Cooperative   Orientation:   All  Speech   Rate / Production: Normal/ Responsive Normal    Volume:  Normal   Mood:    Anxious  Depressed  Normal  Affect:    Appropriate   Thought Content:   Clear and Safety denies any current safety concerns including suicidal ideation, self-harm, and homicidal ideation  Thought Form:  Coherent  Logical     Insight:    Good     Plan:     Safety Plan: No current safety " concerns identified.  Recommended that patient call 911 or go to the local ED should there be a change in any of these risk factors.     Barriers to treatment: None identified    Patient Contracts (see media tab):  None    Substance Use: Provided encouragement towards sobriety     Continue or Discharge: Patient will continue in Adult Day Treatment (ADT)  as planned. Patient is likely to benefit from learning and using skills as they work toward the goals identified in their treatment plan.      Vale Pepe, [facility]  July 19, 2021

## 2021-07-19 NOTE — GROUP NOTE
Psychotherapy Group Note    PATIENT'S NAME: Jailene Breen  MRN:   [id number]  :   1965  [id number]  DATE OF SERVICE: 21  START TIME:  9:00 AM  END TIME:  9:50 AM  FACILITATOR: Fredo Angelo LP  TOPIC:  EBP Group: DDP Relapse Prevention  [facility] Mental Health Day Treatment  TRACK: 2A    NUMBER OF PARTICIPANTS: 8    Telemedicine Visit: The patient's condition can be safely assessed and treated via synchronous audio and visual telemedicine encounter.      Reason for Telemedicine Visit: Services only offered telehealth    Originating Site (Patient Location): Patient's home    Distant Site (Provider Location): Provider Remote Setting- Home Office    Consent:  The patient/guardian has verbally consented to: the potential risks and benefits of telemedicine (video visit) versus in person care; bill my insurance or make self-payment for services provided; and responsibility for payment of non-covered services.     Mode of Communication:  Video Conference via [employer]    As the provider I attest to compliance with applicable laws and regulations related to telemedicine.     Summary of Group / Topics Discussed:  DDP Relapse Prevention: Stages of Change: Patients explored the process and types of change in relation to substance use, including but not limited to: theories of change, steps to making change, methods of changing behavior, and potential barriers to implementing change. Patients discussed their current and past experiences with managing change in relation to substance use and what stage of change they currently identify with. Patients identified what changes may benefit their daily lives and how they can work towards implementing change.    Patient Session Goals / Objectives:    Gained understanding of the change process    Identified positive and negative behavioral patterns    Made plans to track and implement changes and shared experiences in  group    Identified personal barriers to change        Patient Participation / Response:  Moderately participated, sharing some personal reflections / insights and adequately adequately received / provided feedback with other participants.    Demonstrated understanding of topics discussed through group discussion and participation    Treatment Plan:  Patient has a current master individualized treatment plan.  See Epic treatment plan for more information.    Fredo Angelo, LP

## 2021-07-20 ENCOUNTER — MYC MEDICAL ADVICE (OUTPATIENT)
Dept: FAMILY MEDICINE | Facility: CLINIC | Age: 56
End: 2021-07-20

## 2021-07-20 ENCOUNTER — HOSPITAL ENCOUNTER (OUTPATIENT)
Dept: BEHAVIORAL HEALTH | Facility: CLINIC | Age: 56
End: 2021-07-20
Attending: PSYCHIATRY & NEUROLOGY
Payer: COMMERCIAL

## 2021-07-20 DIAGNOSIS — F17.200 TOBACCO USE DISORDER: ICD-10-CM

## 2021-07-20 PROCEDURE — 90853 GROUP PSYCHOTHERAPY: CPT | Mod: 95 | Performed by: COUNSELOR

## 2021-07-20 PROCEDURE — 90853 GROUP PSYCHOTHERAPY: CPT | Mod: HQ,GT | Performed by: COUNSELOR

## 2021-07-20 NOTE — GROUP NOTE
Process Group Note    PATIENT'S NAME: Jailene Breen  MRN:   [id number]  :   1965  ACCT. NUMBER: [id number]  DATE OF SERVICE: 21  START TIME:  9:00 AM  END TIME:  9:50 AM  FACILITATOR: Vale Pepe LPCC  TOPIC:  Process Group    Diagnoses:  Major Depressive Disorder, recurrent, moderate  JEFF  Panic Disorder    [facility] Day Treatment  TRACK: 2A    NUMBER OF PARTICIPANTS: 8                                      Service Modality:  Video Visit     Telemedicine Visit: The patient's condition can be safely assessed and treated via synchronous audio and visual telemedicine encounter.      Reason for Telemedicine Visit: Services only offered telehealth and due to COVID-19    Originating Site (Patient Location): Patient's home    Distant Site (Provider Location): Provider Remote Setting- Home Office    Consent:  The patient/guardian has verbally consented to: the potential risks and benefits of telemedicine (video visit) versus in person care; bill my insurance or make self-payment for services provided; and responsibility for payment of non-covered services.     Patient would like the video invitation sent by:  My Chart    Mode of Communication:  Video Conference via Medical Zoom    As the provider I attest to compliance with applicable laws and regulations related to telemedicine.               Data:    Session content: At the start of this group, patients were invited to check in by identifying themselves, describing their current emotional status, and identifying issues to address in this group.   Area(s) of treatment focus addressed in this session included Symptom Management, Personal Safety and Community Resources/Discharge Planning.    Patient expressed feeling tired as he is adjusting to medication. Patient goal planned to seek out the test results on his stomach. Patient will utilize communication skills to talk with providers. Patient reported being tired might be  a barrier today. Patient reported no safety concerns or chemical use. Patient reported he is taking medications as prescribed. Patient expressed feeling proud that he got into the treatment group after he experienced difficulty with logging on.     Therapeutic Interventions/Treatment Strategies:  Psychotherapist offered support, feedback and validation and reinforced use of skills. Treatment modalities used include Motivational Interviewing and Cognitive Behavioral Therapy. Interventions include Coping Skills: Assisted patient in identifying 1-2 healthy distraction skills to reduce overall distress, Symptoms Management: Promoted understanding of their diagnoses and how it impacts their functioning and Emotions Management:  Discussed barriers to emotional regulation.    Assessment:    Patient response:   Patient responded to session by accepting feedback, giving feedback, listening, focusing on goals, being attentive and accepting support    Possible barriers to participation / learning include: and no barriers identified    Health Issues:   None reported       Substance Use Review:   Substance Use: No active concerns identified.    Mental Status/Behavioral Observations  Appearance:   Appropriate   Eye Contact:   Good   Psychomotor Behavior: Normal   Attitude:   Cooperative   Orientation:   All  Speech   Rate / Production: Normal/ Responsive Normal    Volume:  Normal   Mood:    Anxious  Depressed  Normal  Affect:    Appropriate   Thought Content:   Clear and Safety denies any current safety concerns including suicidal ideation, self-harm, and homicidal ideation  Thought Form:  Coherent  Logical     Insight:    Good     Plan:     Safety Plan: No current safety concerns identified.  Recommended that patient call 911 or go to the local ED should there be a change in any of these risk factors.     Barriers to treatment: None identified    Patient Contracts (see media tab):  None    Substance Use: Provided encouragement  towards sobriety     Continue or Discharge: Patient will continue in Adult Day Treatment (ADT)  as planned. Patient is likely to benefit from learning and using skills as they work toward the goals identified in their treatment plan.      Vale Pepe, [facility]  July 20, 2021

## 2021-07-20 NOTE — GROUP NOTE
Psychotherapy Group Note    PATIENT'S NAME: Jailene Breen  MRN:   [id number]  :   1965  ACCT. NUMBER: [id number]  DATE OF SERVICE: 21  START TIME: 10:00 AM  END TIME: 10:50 AM  FACILITATOR: Vale Pepe LPCC  TOPIC: MH EBP Group: Self-Awareness  [facility] Adult Mental Health Day Treatment  TRACK: 2A    NUMBER OF PARTICIPANTS: 8    Summary of Group / Topics Discussed:  Self-Awareness: Self-Compassion: Patients received overview of key concepts in developing self-compassion. Patients discussed mindfulness, self-kindness, and finding common humanity. Patients identified their current approach to problems in their lives and learned skills for increasing self-compassion. Patients identified ways they can put self-compassion skills into practice and problem solve barriers to application of skills.     Patient Session Goals / Objectives:    [place] components of self-compassion    Identify ways to practice self-compassion in daily life    Problem solve barriers to self-compassion practice                                      Service Modality:  Video Visit     Telemedicine Visit: The patient's condition can be safely assessed and treated via synchronous audio and visual telemedicine encounter.      Reason for Telemedicine Visit: Services only offered telehealth and due to COVID-19    Originating Site (Patient Location): Patient's home    Distant Site (Provider Location): Provider Remote Setting- Home Office    Consent:  The patient/guardian has verbally consented to: the potential risks and benefits of telemedicine (video visit) versus in person care; bill my insurance or make self-payment for services provided; and responsibility for payment of non-covered services.     Patient would like the video invitation sent by:  My Chart    Mode of Communication:  Video Conference via Medical Zoom    As the provider I attest to compliance with applicable laws and regulations related to telemedicine.             Patient Participation / Response:  Minimally participated, only when prompted / asked.    Demonstrated understanding of topics discussed through group discussion and participation and Demonstrated understanding of values, strengths, and challenges to learn about themselves    Treatment Plan:  Patient has a current master individualized treatment plan.  See Epic treatment plan for more information.    Vale Pepe, [facility]

## 2021-07-21 NOTE — GROUP NOTE
Psychoeducation Group Note    PATIENT'S NAME: Jailene Breen  MRN:   [id number]  :   1965  ACCT. NUMBER: [id number]  DATE OF SERVICE: 21  START TIME: 11:00 AM  END TIME: 11:50 AM  FACILITATOR: Yordy White LMFT  TOPIC: ALTAF RN Group: Mind/Body Practice & Complementary  [facility] Adult Mental Health Day Treatment  TRACK: 2A    NUMBER OF PARTICIPANTS: 8    Summary of Group / Topics Discussed:  Mind/Body Practice & Complementary Therapies:  Progressive Muscle Relaxation: In addition to affecting our mood and behavior, psychological stress can cause a myriad of physical symptoms in our body. Patients were educated on these effects and guided to increased self-awareness of how stress affects their body. The purpose, benefits, history, and techniques of progressive muscle relaxation were discussed. In an instructor guided experiential, patients were guided to practice PMR to help reduce physical symptoms of psychological stress and achieve a more balanced feeling of well-being.    Patient Session Goals / Objectives:  ? Identified physiological symptoms of stress on the body  ? Listed & Explained the purpose and benefits to practicing PMR   ? Practiced progressive muscle relaxation experiential                                      Service Modality:  Video Visit     Telemedicine Visit: The patient's condition can be safely assessed and treated via synchronous audio and visual telemedicine encounter.      Reason for Telemedicine Visit: Services only offered telehealth and due to COVID-19.    Originating Site (Patient Location): Patient's home    Distant Site (Provider Location): Provider Remote Setting- Home Office    Consent:  The patient/guardian has verbally consented to: the potential risks and benefits of telemedicine (video visit) versus in person care; bill my insurance or make self-payment for services provided; and responsibility for payment of non-covered services.     Patient would like  the video invitation sent by:  My Chart    Mode of Communication:  Video Conference via Medical Zoom    As the provider I attest to compliance with applicable laws and regulations related to telemedicine.              Patient Participation / Response:  Fully participated with the group by sharing personal reflections / insights and openly received / provided feedback with other participants.    Demonstrated understanding of topics discussed through group discussion and participation, Identified / Expressed personal readiness to practice skills and Verbalized understanding of Mind/Body Practice & Complementary Therapies topic    Treatment Plan:  Patient has a current master individualized treatment plan.  See Epic treatment plan for more information.    Yordy White LMFT

## 2021-07-22 ENCOUNTER — MYC MEDICAL ADVICE (OUTPATIENT)
Dept: SURGERY | Facility: CLINIC | Age: 56
End: 2021-07-22

## 2021-07-22 ENCOUNTER — HOSPITAL ENCOUNTER (OUTPATIENT)
Dept: BEHAVIORAL HEALTH | Facility: CLINIC | Age: 56
End: 2021-07-22
Attending: PSYCHIATRY & NEUROLOGY
Payer: COMMERCIAL

## 2021-07-22 PROCEDURE — 90853 GROUP PSYCHOTHERAPY: CPT | Mod: 95 | Performed by: COUNSELOR

## 2021-07-22 PROCEDURE — 90853 GROUP PSYCHOTHERAPY: CPT | Mod: 95

## 2021-07-22 PROCEDURE — 90853 GROUP PSYCHOTHERAPY: CPT | Mod: GT | Performed by: SOCIAL WORKER

## 2021-07-22 NOTE — TELEPHONE ENCOUNTER
Can you review results of the tests you ordered and make sure everything is in the chart that you need then I can set him up for a appointment. Shanelle Breen Cma  .

## 2021-07-22 NOTE — GROUP NOTE
Service Modality:  Video Visit     Telemedicine Visit: The patient's condition can be safely assessed and treated via synchronous audio and visual telemedicine encounter.      Reason for Telemedicine Visit: Services only offered telehealth    Originating Site (Patient Location): Patient's home    Distant Site (Provider Location): Provider Remote Setting- Home Office    Consent:  The patient/guardian has verbally consented to: the potential risks and benefits of telemedicine (video visit) versus in person care; bill my insurance or make self-payment for services provided; and responsibility for payment of non-covered services.     Patient would like the video invitation sent by:  My Chart    Mode of Communication:  Video Conference via Medical Zoom    As the provider I attest to compliance with applicable laws and regulations related to telemedicine.      Psychotherapy Group Note    PATIENT'S NAME: Jailene Breen  MRN:   [id number]  :   1965  ACCT. NUMBER: [id number]  DATE OF SERVICE: 21  START TIME: 10:00 AM  END TIME: 10:50 AM  FACILITATOR: Maryann Duggan LMFT  TOPIC:  EBP Group: Cognitive Restructuring  [facility] Adult Mental Health Day Treatment  TRACK: 2A    NUMBER OF PARTICIPANTS: 9    Summary of Group / Topics Discussed:  Cognitive Restructuring: Distortions: Patients received an overview of how to identify common cognitive distortions. Patients will explore alternatives to cognitive distortions and practice challenging their negative thought patterns. The goal is to help patients target modify ineffective thought patterns.     Patient Session Goals / Objectives:    Familiarized self with ineffective / unhealthy thoughts and how they develop.      Explored impact of ineffective thoughts / distortions on mood and activity    Formulated new neutral/positive alternatives to challenge less helpful / ineffective thoughts.    Practiced and plan to apply in  daily life               Patient Participation / Response:  Minimally participated, only when prompted / asked.    Demonstrated understanding of topics discussed through group discussion and participation    Treatment Plan:  Patient has a current master individualized treatment plan.  See Epic treatment plan for more information.    ROSEMARY Hanna

## 2021-07-22 NOTE — GROUP NOTE
Psychotherapy Group Note    PATIENT'S NAME: Jailene Breen  MRN:   [id number]  :   1965  ACCT. NUMBER: [id number]  DATE OF SERVICE: 21  START TIME: 11:00 AM  END TIME: 11:50 AM  FACILITATOR: Liliya Oh LICSW  TOPIC: MH EBP Group: Coping Skills  [facility] Adult Mental Health Day Treatment  TRACK: 2A    NUMBER OF PARTICIPANTS: 9    Summary of Group / Topics Discussed:  Coping Skills: Additional Coping Skills:  Patients discussed and practiced the P.L.E.A.S.E skills.  Reviewed the benefits of applying the aforementioned coping strategies.  Patients explored how these strategies might be applied to daily stressors or distressing situations.    Patient Session Goals / Objectives:    Understand the purpose and benefits of applying P.L.E.A.S.E skill for coping strategies    Address barriers to utilizing coping skills when in distress.                                      Service Modality:  Video Visit     Telemedicine Visit: The patient's condition can be safely assessed and treated via synchronous audio and visual telemedicine encounter.      Reason for Telemedicine Visit: Services only offered telehealth    Originating Site (Patient Location): Patient's home    Distant Site (Provider Location): Provider Remote Setting- Home Office    Consent:  The patient/guardian has verbally consented to: the potential risks and benefits of telemedicine (video visit) versus in person care; bill my insurance or make self-payment for services provided; and responsibility for payment of non-covered services.     Patient would like the video invitation sent by:  My Chart    Mode of Communication:  Video Conference via Medical Zoom    As the provider I attest to compliance with applicable laws and regulations related to telemedicine.           Patient Participation / Response:  Fully participated with the group by sharing personal reflections / insights and openly received / provided feedback with other  participants.    Demonstrated knowledge of when to consider using a variety of coping skills in daily life    Treatment Plan:  Patient has a current master individualized treatment plan.  See Epic treatment plan for more information.    Liliya Oh, WALLYSW

## 2021-07-22 NOTE — GROUP NOTE
Process Group Note    PATIENT'S NAME: Jailene Breen  MRN:   [id number]  :   1965  ACCT. NUMBER: [id number]  DATE OF SERVICE: 21  START TIME:  9:00 AM  END TIME:  9:50 AM  FACILITATOR: Vale Pepe LPCC  TOPIC:  Process Group    Diagnoses:  Major Depressive Disorder, recurrent, moderate  JEFF  Panic Disorder    [facility] Day Treatment  TRACK: 2A    NUMBER OF PARTICIPANTS: 9                                      Service Modality:  Video Visit     Telemedicine Visit: The patient's condition can be safely assessed and treated via synchronous audio and visual telemedicine encounter.      Reason for Telemedicine Visit: Services only offered telehealth and due to COVID-19    Originating Site (Patient Location): Patient's home    Distant Site (Provider Location): Provider Remote Setting- Home Office    Consent:  The patient/guardian has verbally consented to: the potential risks and benefits of telemedicine (video visit) versus in person care; bill my insurance or make self-payment for services provided; and responsibility for payment of non-covered services.     Patient would like the video invitation sent by:  My Chart    Mode of Communication:  Video Conference via Medical Zoom    As the provider I attest to compliance with applicable laws and regulations related to telemedicine.                Data:    Session content: At the start of this group, patients were invited to check in by identifying themselves, describing their current emotional status, and identifying issues to address in this group.   Area(s) of treatment focus addressed in this session included Symptom Management, Personal Safety and Community Resources/Discharge Planning.    Patient reported feeling  so fat, so good  after waking up recently. Patient goal planned to have  a good day today.  Patient reported he went to bed early last night. Patient reported no identifiable barriers. Patient reported no  safety concerns. Patient expressed feeling proud that he woke up on time for treatment group.     Therapeutic Interventions/Treatment Strategies:  Psychotherapist offered support, feedback and validation and reinforced use of skills. Treatment modalities used include Motivational Interviewing and Cognitive Behavioral Therapy. Interventions include Coping Skills: Assisted patient in identifying 1-2 healthy distraction skills to reduce overall distress, Symptoms Management: Promoted understanding of their diagnoses and how it impacts their functioning and Emotions Management:  Discussed barriers to emotional regulation.    Assessment:    Patient response:   Patient responded to session by accepting feedback, giving feedback, listening, focusing on goals, being attentive and accepting support    Possible barriers to participation / learning include: and no barriers identified    Health Issues:   None reported       Substance Use Review:   Substance Use: No active concerns identified.    Mental Status/Behavioral Observations  Appearance:   Appropriate   Eye Contact:   Good   Psychomotor Behavior: Normal   Attitude:   Cooperative   Orientation:   All  Speech   Rate / Production: Normal/ Responsive Normal    Volume:  Normal   Mood:    Anxious  Depressed  Normal  Affect:    Appropriate   Thought Content:   Clear and Safety denies any current safety concerns including suicidal ideation, self-harm, and homicidal ideation  Thought Form:  Coherent  Logical     Insight:    Good     Plan:     Safety Plan: No current safety concerns identified.  Recommended that patient call 911 or go to the local ED should there be a change in any of these risk factors.     Barriers to treatment: None identified    Patient Contracts (see media tab):  None    Substance Use: Provided encouragement towards sobriety     Continue or Discharge: Patient will continue in Adult Day Treatment (ADT)  as planned. Patient is likely to benefit from learning  and using skills as they work toward the goals identified in their treatment plan.      Vale Pepe, [facility]  July 22, 2021

## 2021-07-23 NOTE — TELEPHONE ENCOUNTER
Dari please call over to get the PH results then have them faxed to Dr Mack so we can set him up for a appointment to have surgery. Shanelle Breen Cma

## 2021-07-23 NOTE — PROGRESS NOTES
Patient Active Problem List   Diagnosis     CARDIOVASCULAR SCREENING; LDL GOAL LESS THAN 160     Anxiety     Overweight (BMI 25.0-29.9)     Back pain     Tear meniscus knee, right, initial encounter     Rheumatoid arthritis involving multiple sites, unspecified rheumatoid factor presence     Chronic pain     Right-sided thoracic back pain, unspecified chronicity     JEFF (generalized anxiety disorder)     Panic attack     Syncope, unspecified syncope type     Ankylosing spondylitis (H)     Tobacco use disorder     Moderate major depression (H)     Immunocompromised (H)     Essential hypertension     Suicidal ideation     Insomnia due to other mental disorder     Suicide ideation     Depression, unspecified depression type     Major depressive disorder, recurrent episode, severe with mixed features (H)       Current Outpatient Medications:      acetaminophen (TYLENOL) 500 MG tablet, Take 1,000 mg by mouth every 6 hours as needed for mild pain (headache), Disp: , Rfl:      atenolol (TENORMIN) 25 MG tablet, Take 1 tablet (25 mg) by mouth daily, Disp: 90 tablet, Rfl: 1     buprenorphine HCl-naloxone HCl (SUBOXONE) 8-2 MG per film, Use 0.5 film under the tongue 4 times per day for pain, approximately every 6 hours. Max 2 films per day. Fill 7/9/21 and begin 7/9/2021.  30 day supply, Disp: 60 each, Rfl: 0     chlorzoxazone (PARAFON FORTE) 500 MG tablet, Take 1 tablet (500 mg) by mouth 3 times daily as needed for muscle spasms, Disp: 45 tablet, Rfl: 1     clonazePAM (KLONOPIN) 1 MG tablet, TAKE ONE-HALF - 1 TABLET BY MOUTH ONCE DAILY AS NEEDED FOR ANXIETY, Disp: 5 tablet, Rfl: 0     etanercept (ENBREL SURECLICK) 50 MG/ML autoinjector, Inject 50 mg Subcutaneous once a week, Disp: 1 mL, Rfl: 3     fish oil-omega-3 fatty acids 1000 MG capsule, Take 1 g by mouth daily, Disp: , Rfl:      FLUoxetine (PROZAC) 40 MG capsule, Take 1 capsule (40 mg) by mouth daily, Disp: 30 capsule, Rfl: 1     folic acid (FOLVITE) 1 MG tablet, Take 5  tablets (5 mg) by mouth daily, Disp: 150 tablet, Rfl: 3     liothyronine (CYTOMEL) 25 MCG tablet, Take 1 tablet (25 mcg) by mouth daily, Disp: 30 tablet, Rfl: 1     medical cannabis (Patient's own supply), See Admin Instructions (The purpose of this order is to document that the patient reports taking medical cannabis.  This is not a prescription, and is not used to certify that the patient has a qualifying medical condition.)   Pills PRN  Lozenges PRN, Disp: , Rfl:      methotrexate sodium 2.5 MG TABS, TAKE 4 TABLETS BY MOUTH EVERY 7 DAYS, Disp: 48 tablet, Rfl: 0     multivitamin (CENTRUM SILVER) tablet, Take 1 tablet by mouth daily, Disp: , Rfl:      nicotine polacrilex (NICORETTE) 4 MG gum, PLACE 1 PIECE INSIDE CHEEK AS NEEDED FOR SMOKING CESSATION, Disp: 200 each, Rfl: 1     omeprazole (PRILOSEC) 20 MG DR capsule, Take 20 mg by mouth daily as needed , Disp: , Rfl:      prazosin (MINIPRESS) 1 MG capsule, Take 1 capsule (1 mg) by mouth At Bedtime, Disp: 30 capsule, Rfl: 1     QUEtiapine (SEROQUEL) 100 MG tablet, Take 1 tablet (100 mg) by mouth At Bedtime, Disp: 30 tablet, Rfl: 1     QUEtiapine (SEROQUEL) 25 MG tablet, Take 1-2 tablets (25-50 mg) by mouth daily as needed (for severe anxiety/panic attacks or sleep), Disp: 30 tablet, Rfl: 0     vitamin D2 (ERGOCALCIFEROL) 40259 units (1250 mcg) capsule, Take 50,000 Units by mouth twice a week mon and thurs, Disp: , Rfl:   Psychiatry staffing: case discussed  Diagnosis:  As above;  Loss issuesd

## 2021-07-26 ENCOUNTER — TELEPHONE (OUTPATIENT)
Dept: GASTROENTEROLOGY | Facility: OUTPATIENT CENTER | Age: 56
End: 2021-07-26

## 2021-07-26 ENCOUNTER — HOSPITAL ENCOUNTER (OUTPATIENT)
Dept: BEHAVIORAL HEALTH | Facility: CLINIC | Age: 56
End: 2021-07-26
Attending: PSYCHIATRY & NEUROLOGY
Payer: COMMERCIAL

## 2021-07-26 PROCEDURE — 90853 GROUP PSYCHOTHERAPY: CPT | Mod: GT | Performed by: PSYCHOLOGIST

## 2021-07-26 NOTE — GROUP NOTE
Psychotherapy Group Note                                    Service Modality:  Video Visit     Telemedicine Visit: The patient's condition can be safely assessed and treated via synchronous audio and visual telemedicine encounter.      Reason for Telemedicine Visit: Patient has requested telehealth visit, Patient unable to travel, Patient convenience (e.g. access to timely appointments / distance to available provider), Patient lives in a designated Health Professional Shortage Area (HPSA), and Services only offered telehealth    Originating Site (Patient Location): Patient's home    Distant Site (Provider Location): [facility]: [facility], [facility]    Consent:  The patient/guardian has verbally consented to: the potential risks and benefits of telemedicine (video visit) versus in person care; bill my insurance or make self-payment for services provided; and responsibility for payment of non-covered services.     Patient would like the video invitation sent by:  My Chart    Mode of Communication:  Video Conference via Medical Zoom    As the provider I attest to compliance with applicable laws and regulations related to telemedicine.        PATIENT'S NAME: Jailene Breen  MRN:   [id number]  :   1965  ACCT. NUMBER: [id number]  DATE OF SERVICE: 21  START TIME: 10:00 AM  END TIME: 10:50 AM  FACILITATOR: Josefina Beauchamp PsyD  TOPIC:  EBP Group: Cognitive Restructuring  [facility] Adult Mental Health Day Treatment  TRACK: 2A    NUMBER OF PARTICIPANTS: 8    Summary of Group / Topics Discussed:  Cognitive Restructuring: Distortions: Patients received an overview of how to identify common cognitive distortions. Patients will explore alternatives to cognitive distortions and practice challenging their negative thought patterns. The goal is to help patients target modify ineffective thought patterns.     Patient Session Goals / Objectives:    Familiarized self with ineffective /  unhealthy thoughts and how they develop.      Explored impact of ineffective thoughts / distortions on mood and activity    Formulated new neutral/positive alternatives to challenge less helpful / ineffective thoughts.    Practiced and plan to apply in daily life               Patient Participation / Response:  Fully participated with the group by sharing personal reflections / insights and openly received / provided feedback with other participants.    Demonstrated knowledge of personal thought patterns and how they impact their mood and behavior.    Treatment Plan:  Patient has a current master individualized treatment plan.  See Epic treatment plan for more information.    Alisia Woodward Psy., TIRSO,  Licensed Clinical Psychologist

## 2021-07-26 NOTE — GROUP NOTE
Process Group Note                                    Service Modality:  Video Visit     Telemedicine Visit: The patient's condition can be safely assessed and treated via synchronous audio and visual telemedicine encounter.      Reason for Telemedicine Visit: Patient has requested telehealth visit, Patient unable to travel, Patient convenience (e.g. access to timely appointments / distance to available provider), Patient lives in a designated Health Professional Shortage Area (HPSA), and Services only offered telehealth    Originating Site (Patient Location): Patient's home    Distant Site (Provider Location): [facility]: [facility], [facility]    Consent:  The patient/guardian has verbally consented to: the potential risks and benefits of telemedicine (video visit) versus in person care; bill my insurance or make self-payment for services provided; and responsibility for payment of non-covered services.     Patient would like the video invitation sent by:  My Chart    Mode of Communication:  Video Conference via Medical Zoom    As the provider I attest to compliance with applicable laws and regulations related to telemedicine.        PATIENT'S NAME: Jailene Breen  MRN:   [id number]  :   1965  ACCT. NUMBER: [id number]  DATE OF SERVICE: 21  START TIME:  9:00 AM  END TIME:  9:50 AM  FACILITATOR: Josefina Beauchamp PsyD  TOPIC:  Process Group    Diagnoses:    Major Depressive Disorder, recurrent, moderate  JEFF  Panic Disorder    Rheumatoid Arthritis    Fredo Hughes MD MD Orthopedics [address]  21   Phone: [phone]; Fax: [phone]      Aiden Resendez PA  Assigned PCP  2020   Namita Sharma MD Resident Student in organized health care education/training program 2020 End  20   Phone: [phone]; Fax: [phone]      Barbie Christie, PhD LP Psychologist Licensed Mental Health [address]  20   Phone:  "[phone]; Fax: [phone]      Comment: supervising      Susana Pike APRN CNP Referring Physician Nurse Practitioner 08/18/2020 End  8/18/20   Phone: [phone]; Fax: [phone]      Raghu Parada MD  Assigned Rheumatology Provider  10/23/2020   10/28/20            Chanelle Solo MD MD Psychiatry 02/24/2021 End  2/24/21   Phone: [phone]; Fax: [phone]      Comment: Attending      Coffin, Richard Breyen, MD Resident Psychiatry 02/24/2021 End  2/24/21   Phone: [phone]; Fax: [phone]            [facility] Mental Health Day Treatment  TRACK: 2A    NUMBER OF PARTICIPANTS: 8          Data:    Session content: At the start of this group, patients were invited to check in by identifying themselves, describing their current emotional status, and identifying issues to address in this group.   Area(s) of treatment focus addressed in this session included Symptom Management, Personal Safety and Community Resources/Discharge Planning.  Client reported being safe today.  Reported mood is depressed.     Goal for today is to attend therapy groups.  The client talked to the group about how he has been nauseated, lost his appetite and lost 30# in the past month. He reported that he will talk with his psychiatrist about the nausea issue to see if there is a side effect interaction between medications. He reported that the Seroquel is very sedating and he cut back on the amount of it.       Therapeutic Interventions/Treatment Strategies:  Psychotherapist reinforced use of skills. Treatment modalities used include Cognitive Behavioral Therapy and Dialectical Behavioral Therapy. Interventions include Cognitive Restructuring:  Assisted patient in formulating new neutral/positive alternatives to challenge less helpful / ineffective thoughts, Coping Skills: Discussed how the use of intentional \"in the moment\" actions can help reduce distress and Symptoms Management: Promoted " understanding of their diagnoses and how it impacts their functioning.    Assessment:    Patient response:   Patient responded to session by listening, focusing on goals and appearing alert    Possible barriers to participation / learning include: severity of symptoms    Health Issues:   Yes: Pain, Associated Psychological Distress       Substance Use Review:   Substance Use: No active concerns identified.    Mental Status/Behavioral Observations  Appearance:   Appropriate   Eye Contact:   Good   Psychomotor Behavior: Normal   Attitude:   Cooperative   Orientation:   All  Speech   Rate / Production: Normal    Volume:  Normal   Mood:    Anxious  Depressed  Sad   Affect:    Constricted   Thought Content:   Rumination  Thought Form:  Coherent  Logical     Insight:    Good     Plan:     Safety Plan: Recommended that patient call 911 or go to the local ED should there be a change in any of these risk factors.     Barriers to treatment: None identified    Patient Contracts (see media tab):  None    Substance Use: Provided encouragement towards sobriety     Continue or Discharge: Patient will continue in Adult Day Treatment (ADT)  as planned. Patient is likely to benefit from learning and using skills as they work toward the goals identified in their treatment plan.      Josefina Beauchamp PsyD  July 26, 2021  Elpidio Blake D,  Licensed Clinical Psychologist

## 2021-07-26 NOTE — TELEPHONE ENCOUNTER
Staff message sent to Nurse [facility]     This pt had an EGD w/ [facility] on 6/3/2021 w/ Dr. Carroll. The referring clinic is looking for the PH results. Fax number: [phone] /attn: Dari    Can someone please assist with sending in ph results to fax number attached?

## 2021-07-26 NOTE — ADDENDUM NOTE
Encounter addended by: Josefina Beauchamp PsyD on: 7/26/2021 4:47 PM   Actions taken: Flowsheet accepted

## 2021-07-26 NOTE — GROUP NOTE
Psychotherapy Group Note                                    Service Modality:  Video Visit     Telemedicine Visit: The patient's condition can be safely assessed and treated via synchronous audio and visual telemedicine encounter.      Reason for Telemedicine Visit: Patient has requested telehealth visit, Patient unable to travel, Patient convenience (e.g. access to timely appointments / distance to available provider), Patient lives in a designated Health Professional Shortage Area (HPSA), and Services only offered telehealth    Originating Site (Patient Location): Patient's home    Distant Site (Provider Location): [facility]: [facility], [facility]    Consent:  The patient/guardian has verbally consented to: the potential risks and benefits of telemedicine (video visit) versus in person care; bill my insurance or make self-payment for services provided; and responsibility for payment of non-covered services.     Patient would like the video invitation sent by:  My Chart    Mode of Communication:  Video Conference via Medical Zoom    As the provider I attest to compliance with applicable laws and regulations related to telemedicine.        PATIENT'S NAME: Jailene Breen  MRN:   [id number]  :   1965  ACCT. NUMBER: [id number]  DATE OF SERVICE: 21  START TIME: 11:00 AM  END TIME: 11:50 AM  FACILITATOR: Josefina Beauchamp PsyD  TOPIC:  EBP Group: Behavioral Activation  [facility] Adult Mental Health Day Treatment  TRACK: 2A    NUMBER OF PARTICIPANTS: 7    Summary of Group / Topics Discussed:  Behavioral Activation: Activity Scheduling:Patients explored how they currently spend their time, and how specific behaviors impact thoughts and feelings.  The group explored the effect of negative and positive activities on mood states and thought patterns.  Patients identified activities that help to improve mood and thinking patterns, and developed a plan to implement positive activities  between sessions.      Patient Session Goals / Objectives:    Identify impact of current behaviors on thoughts and mood    Identify 2-3 behavioral changes that could have a positive impact on thoughts and mood    Prepare to make desired behavioral change: Create a change plan / activity schedule      Patient Participation / Response:  Fully participated with the group by sharing personal reflections / insights and openly received / provided feedback with other participants.    Expressed understanding of the relationship between behaviors, thoughts, and feelings    Treatment Plan:  Patient has a current master individualized treatment plan.  See Epic treatment plan for more information.    Alisia Woodward Psy., TIRSO,  Licensed Clinical Psychologist

## 2021-07-27 ENCOUNTER — HOSPITAL ENCOUNTER (OUTPATIENT)
Dept: BEHAVIORAL HEALTH | Facility: CLINIC | Age: 56
End: 2021-07-27
Attending: PSYCHIATRY & NEUROLOGY
Payer: COMMERCIAL

## 2021-07-27 PROCEDURE — 90853 GROUP PSYCHOTHERAPY: CPT | Mod: GT | Performed by: PSYCHOLOGIST

## 2021-07-27 PROCEDURE — 90853 GROUP PSYCHOTHERAPY: CPT | Mod: 95 | Performed by: PSYCHOLOGIST

## 2021-07-27 PROCEDURE — 90853 GROUP PSYCHOTHERAPY: CPT | Mod: 95 | Performed by: SOCIAL WORKER

## 2021-07-27 NOTE — GROUP NOTE
Psychotherapy Group Note    PATIENT'S NAME: Jailene Breen  MRN:   [id number]  :   1965  ACCT. NUMBER: [id number]  DATE OF SERVICE: 21  START TIME: 11:00 AM  END TIME: 11:50 AM  FACILITATOR: Liliya Oh LICSW  TOPIC: MH EBP Group: Coping Skills  [facility] Adult Mental Health Day Treatment  TRACK: 2A    NUMBER OF PARTICIPANTS: 8    Summary of Group / Topics Discussed:  Coping Skills: Additional Coping Skills:  Patients discussed and practiced mindfulness and relaxation.  Reviewed the benefits of applying the aforementioned coping strategies.  Patients explored how these strategies might be applied to daily stressors or distressing situations.    Patient Session Goals / Objectives:    Understand the purpose and benefits of applying mindfulness and relaxation  coping strategies    Address barriers to utilizing coping skills when in distress.                                    Service Modality:  Video Visit     Telemedicine Visit: The patient's condition can be safely assessed and treated via synchronous audio and visual telemedicine encounter.      Reason for Telemedicine Visit: Services only offered telehealth    Originating Site (Patient Location): Patient's home    Distant Site (Provider Location): Provider Remote Setting- Home Office    Consent:  The patient/guardian has verbally consented to: the potential risks and benefits of telemedicine (video visit) versus in person care; bill my insurance or make self-payment for services provided; and responsibility for payment of non-covered services.     Patient would like the video invitation sent by:  My Chart    Mode of Communication:  Video Conference via Medical Zoom    As the provider I attest to compliance with applicable laws and regulations related to telemedicine.             Patient Participation / Response:  Fully participated with the group by sharing personal reflections / insights and openly received / provided feedback  with other participants.    Demonstrated knowledge of when to consider using a variety of coping skills in daily life    Treatment Plan:  Patient has a current master individualized treatment plan.  See Epic treatment plan for more information.    Liliya Oh, WALLYSW

## 2021-07-27 NOTE — GROUP NOTE
Process Group Note                                    Service Modality:  Video Visit     Telemedicine Visit: The patient's condition can be safely assessed and treated via synchronous audio and visual telemedicine encounter.      Reason for Telemedicine Visit: Patient has requested telehealth visit, Patient unable to travel, Patient convenience (e.g. access to timely appointments / distance to available provider), Patient lives in a designated Health Professional Shortage Area (HPSA), and Services only offered telehealth    Originating Site (Patient Location): Patient's home    Distant Site (Provider Location): [facility]: [facility], [facility]    Consent:  The patient/guardian has verbally consented to: the potential risks and benefits of telemedicine (video visit) versus in person care; bill my insurance or make self-payment for services provided; and responsibility for payment of non-covered services.     Patient would like the video invitation sent by:  My Chart    Mode of Communication:  Video Conference via Medical Zoom    As the provider I attest to compliance with applicable laws and regulations related to telemedicine.        PATIENT'S NAME: Jailene Breen  MRN:   [id number]  :   1965  ACCT. NUMBER: [id number]  DATE OF SERVICE: 21  START TIME:  9:00 AM  END TIME:  9:50 AM  FACILITATOR: Josefina Beauchamp PsyD  TOPIC:  Process Group    Diagnoses:    Major Depressive Disorder, recurrent, moderate  JEFF  Panic Disorder    Rheumatoid Arthritis    Fredo Hughes MD MD Orthopedics 2016 End  21   Phone: [phone]; Fax: [phone]      Aiden Resendez PA  Assigned PCP  2020   Namita Sharma MD Resident Student in organized health care education/training program 2020 End  20   Phone: [phone]; Fax: [phone]      Barbie Christie, PhD LP Psychologist Licensed Mental Health 2020 End  20   Phone:  [phone]; Fax: [phone]      Comment: supervising      Susana Pike APRN CNP Referring Physician Nurse Practitioner 08/18/2020 End  8/18/20   Phone: [phone]; Fax: [phone]      Raghu Parada MD  Assigned Rheumatology Provider  10/23/2020   10/28/20            Chanelle Solo MD MD Psychiatry 02/24/2021 End  2/24/21   Phone: [phone]; Fax: [phone]      Comment: Attending      Coffin, Richard Breyen, MD Resident Psychiatry 02/24/2021 End  2/24/21   Phone: [phone]; Fax: [phone]            [facility] Mental Health Day Treatment  TRACK: 2A    NUMBER OF PARTICIPANTS: 7          Data:    Session content: At the start of this group, patients were invited to check in by identifying themselves, describing their current emotional status, and identifying issues to address in this group.   Area(s) of treatment focus addressed in this session included Symptom Management, Personal Safety and Community Resources/Discharge Planning.  Client reported being safe today.  Reported mood is ok.    Goal for today is to attend therapy groups. The client talked to the group about how he registered for classes in the Fall, and will complete those he dropped, and talked to the group about his mood fluctuations, and his pain medication issues. He reported upcoming appointments on 8/3 with psychiatry and in two weeks with Pain Clinic.      Therapeutic Interventions/Treatment Strategies:  Psychotherapist reinforced use of skills. Treatment modalities used include Cognitive Behavioral Therapy and Dialectical Behavioral Therapy. Interventions include Cognitive Restructuring:  Assisted patient in formulating new neutral/positive alternatives to challenge less helpful / ineffective thoughts, Coping Skills: Assisted patient in identifying 1-2 healthy distraction skills to reduce overall distress, Relapse Prevention: Coached on skills to manage factors that contribute to relapse and  Mindfulness: Facilitated discussion of when/how to use mindfulness skills to benefit general health, mental health symptoms, and stressors.    Assessment:    Patient response:   Patient responded to session by listening, focusing on goals and accepting support    Possible barriers to participation / learning include: severity of symptoms    Health Issues:   Yes: Pain, Associated Psychological Distress       Substance Use Review:   Substance Use: No active concerns identified.    Mental Status/Behavioral Observations  Appearance:   Appropriate   Eye Contact:   Good   Psychomotor Behavior: Normal   Attitude:   Cooperative   Orientation:   All  Speech   Rate / Production: Normal    Volume:  Normal   Mood:    Anxious  Depressed  Sad   Affect:    Constricted   Thought Content:   Rumination  Thought Form:  Coherent  Logical     Insight:    Good     Plan:     Safety Plan: Recommended that patient call 911 or go to the local ED should there be a change in any of these risk factors.     Barriers to treatment: None identified    Patient Contracts (see media tab):  None    Substance Use: Provided encouragement towards sobriety     Continue or Discharge: Patient will continue in Adult Day Treatment (ADT)  as planned. Patient is likely to benefit from learning and using skills as they work toward the goals identified in their treatment plan.      Josefina Beauchamp PsyD  July 27, 2021  Elpidio Blake D,  Licensed Clinical Psychologist

## 2021-07-27 NOTE — GROUP NOTE
Psychotherapy Group Note                                    Service Modality:  Video Visit     Telemedicine Visit: The patient's condition can be safely assessed and treated via synchronous audio and visual telemedicine encounter.      Reason for Telemedicine Visit: Patient has requested telehealth visit, Patient unable to travel, Patient convenience (e.g. access to timely appointments / distance to available provider), Patient lives in a designated Health Professional Shortage Area (HPSA), and Services only offered telehealth    Originating Site (Patient Location): Patient's home    Distant Site (Provider Location): [facility]: [facility], [facility]    Consent:  The patient/guardian has verbally consented to: the potential risks and benefits of telemedicine (video visit) versus in person care; bill my insurance or make self-payment for services provided; and responsibility for payment of non-covered services.     Patient would like the video invitation sent by:  My Chart    Mode of Communication:  Video Conference via Medical Zoom    As the provider I attest to compliance with applicable laws and regulations related to telemedicine.        PATIENT'S NAME: Jailene Breen  MRN:   [id number]  :   1965  ACCT. NUMBER: [id number]  DATE OF SERVICE: 21  START TIME: 10:00 AM  END TIME: 10:50 AM  FACILITATOR: Josefina Beauchamp PsyD  TOPIC:  EBP Group: Relationship Skills  [facility] Adult Mental Health Day Treatment  TRACK: 2A    NUMBER OF PARTICIPANTS: 6    Summary of Group / Topics Discussed:  Relationship Skills: Basic Communication: Patients were provided with a general overview of interpersonal communication skills and information about how communication skills impact symptoms and functioning. The goal of this topic is to help patients identify communication issues and gain skills to work towards healthier interpersonal communication. Patients reviewed their current awareness of  communication issues and how communication skills impact relationships and functioning.      Patient Session Goals / Objectives:    Identified and discussed patients individual challenges with basic communication    Presented and practiced effective communication skills in session    Assisted patients in implementing more effective communication skills in their relationships      Patient Participation / Response:  Fully participated with the group by sharing personal reflections / insights and openly received / provided feedback with other participants.    Identified / Expressed personal readiness to incorporate effective communication skills    Treatment Plan:  Patient has a current master individualized treatment plan.  See Epic treatment plan for more information.    Alisia Woodward Psy., D,  Licensed Clinical Psychologist

## 2021-07-27 NOTE — ADDENDUM NOTE
Encounter addended by: Josefina Beauchamp PsyD on: 7/27/2021 4:11 PM   Actions taken: Order Reconciliation Section accessed

## 2021-07-29 ENCOUNTER — HOSPITAL ENCOUNTER (OUTPATIENT)
Dept: BEHAVIORAL HEALTH | Facility: CLINIC | Age: 56
End: 2021-07-29
Attending: PSYCHIATRY & NEUROLOGY
Payer: COMMERCIAL

## 2021-07-29 PROCEDURE — 90853 GROUP PSYCHOTHERAPY: CPT | Mod: 95 | Performed by: SOCIAL WORKER

## 2021-07-29 PROCEDURE — 90853 GROUP PSYCHOTHERAPY: CPT | Mod: GT

## 2021-07-29 PROCEDURE — 90853 GROUP PSYCHOTHERAPY: CPT | Mod: 95

## 2021-07-29 NOTE — GROUP NOTE
Psychotherapy Group Note    PATIENT'S NAME: Jailene Breen  MRN:   [id number]  :   1965  ACCT. NUMBER: [id number]  DATE OF SERVICE: 21  START TIME: 10:00 AM  END TIME: 10:50 AM  FACILITATOR: Susana Dorantes  TOPIC: MH EBP Group: Specialty Awareness  [facility] Adult Mental Health Day Treatment  TRACK: 2A    NUMBER OF PARTICIPANTS: 8                                      Service Modality:  Video Visit     Telemedicine Visit: The patient's condition can be safely assessed and treated via synchronous audio and visual telemedicine encounter.      Reason for Telemedicine Visit: Services only offered telehealth    Originating Site (Patient Location): Patient's home    Distant Site (Provider Location): Provider Remote Setting- Home Office    Consent:  The patient/guardian has verbally consented to: the potential risks and benefits of telemedicine (video visit) versus in person care; bill my insurance or make self-payment for services provided; and responsibility for payment of non-covered services.     Patient would like the video invitation sent by:  My Chart    Mode of Communication:  Video Conference via Medical Zoom    As the provider I attest to compliance with applicable laws and regulations related to telemedicine.          Summary of Group / Topics Discussed:  Specialty Topics: Trauma and PTSD: Patients were provided with information to understand the types and origins of trauma, the relationship between trauma and PTSD, the scope of Trauma-Informed Care, and treatment options. Patients were able to explore how trauma may have impacted their functioning directly or indirectly, with reference to the the complexity of trauma and associated treatment options. Patients reviewed their current awareness of this topic and relevance to their functioning. Individual experiences with symptoms and treatment options were discussed.     Patient Session Goals / Objectives:    Discussed definitions of trauma,  Trauma-Informed Care, PTSD    Discussed how traumatic experiences affect the individual and their relationships (directly, indirectly, brain development and function)    Identified how a history of trauma or exposure to trauma may impact group work    Assisted patients to find ways to adapt functioning in light of past traumatic experience(s), and to identify suitable treatment options and community resources      Patient Participation / Response:  Fully participated with the group by sharing personal reflections / insights and openly received / provided feedback with other participants.    Demonstrated understanding of topics discussed through group discussion and participation    Treatment Plan:  Patient has an initial individualized treatment plan that was created as part of their diagnostic assessment / admission process.  A master individualized treatment plan is in the process of being developed with the patient and multi-disciplinary care team.    Susana Dorantes

## 2021-07-29 NOTE — GROUP NOTE
"Process Group Note    PATIENT'S NAME: Jailene Breen  MRN:   [id number]  :   1965  ACCT. NUMBER: [id number]  DATE OF SERVICE: 21  START TIME:  9:00 AM  END TIME:  9:50 AM  FACILITATOR: Susana Dorantes  TOPIC:  Process Group    Diagnosis:  Major Depressive Disorder, recurrent, moderate  JEFF  Panic Disorder    [facility] Day Treatment  TRACK: ADT 2A    NUMBER OF PARTICIPANTS: 8                                      Service Modality:  Video Visit     Telemedicine Visit: The patient's condition can be safely assessed and treated via synchronous audio and visual telemedicine encounter.      Reason for Telemedicine Visit: Services only offered telehealth    Originating Site (Patient Location): Patient's home    Distant Site (Provider Location): Provider Remote Setting- Home Office    Consent:  The patient/guardian has verbally consented to: the potential risks and benefits of telemedicine (video visit) versus in person care; bill my insurance or make self-payment for services provided; and responsibility for payment of non-covered services.     Patient would like the video invitation sent by:  My Chart    Mode of Communication:  Video Conference via Medical Zoom    As the provider I attest to compliance with applicable laws and regulations related to telemedicine.                Data:    Session content: At the start of this group, patients were invited to check in by identifying themselves, describing their current emotional status, and identifying issues to address in this group.   Area(s) of treatment focus addressed in this session included Symptom Management, Personal Safety and Community Resources/Discharge Planning.  Client denies SI over the past few weeks but does report feeling \"worthless.\" Reports \"I wake up tired and remain tired.\" Reports poor appetite, losing weight, and difficulty keeping food down. States he has a doctor appointment next week and will discuss possible " "med change. States he likes to keep his mind busy, \"I don't like silence.\" Reports keeping busy with video games and looks forward to taking classes again after dropping out last semester.     Therapeutic Interventions/Treatment Strategies:  Psychotherapist offered support, feedback and validation and reinforced use of skills. Treatment modalities used include Motivational Interviewing and Cognitive Behavioral Therapy. Interventions include Symptoms Management: Promoted understanding of their diagnoses and how it impacts their functioning.     Assessment:    Patient response:   Patient responded to session by listening, focusing on goals, being attentive and accepting support    Possible barriers to participation / learning include: tired    Health Issues:   Yes: Reports little appetite, not eating much, decrease in weight, tired. Will discuss with doctor next week   Also \"problems keeping food down.\"       Substance Use Review:   Substance Use: No active concerns identified.    Mental Status/Behavioral Observations  Appearance:   Appropriate   Eye Contact:   Good   Psychomotor Behavior: Normal   Attitude:   Cooperative   Orientation:   All  Speech   Rate / Production: Normal    Volume:  Normal   Mood:    Normal  Affect:    Blunted   Thought Content:   Clear  Thought Form:  Coherent  Logical     Insight:    Fair     Plan:     Safety Plan: No current safety concerns identified.  Recommended that patient call 911 or go to the local ED should there be a change in any of these risk factors.     Barriers to treatment: feeling \"tired.\"\"    Patient Contracts (see media tab):  None    Substance Use: Not addressed in session     Continue or Discharge: Patient will continue in Adult Day Treatment (ADT)  as planned. Patient is likely to benefit from learning and using skills as they work toward the goals identified in their treatment plan.      Susana Dorantes  July 29, 2021    "

## 2021-07-29 NOTE — GROUP NOTE
Psychotherapy Group Note    PATIENT'S NAME: Jailene Breen  MRN:   [id number]  :   1965  ACCT. NUMBER: [id number]  DATE OF SERVICE: 21  START TIME: 11:00 AM  END TIME: 11:50 AM  FACILITATOR: Liliya Oh LICSW  TOPIC: MH EBP Group: Coping Skills  [facility] Mental Health Day Treatment  TRACK: 2A    NUMBER OF PARTICIPANTS: 8    Summary of Group / Topics Discussed:  Coping Skills: Radical Acceptance: Patients learned radical acceptance as a way to tolerate heightened stress in the moment.  Patients identified situations that necessitate radical acceptance.  They focused on applying acceptance of the moment to tolerate difficult emotions and events. Patients discussed how to distinguish when this can be useful in their lives and when other skills are more relevant or helpful.    Patient Session Goals / Objectives:    Understand that some amount of pain exists for each of us in our lives    Process the difficulty of acceptance in our lives and benefits of radical acceptance to mood and functioning.    Demonstrate understanding of when to use the radical acceptance to tolerate distress by providing examples of situations where this could be applied.    Identify barriers to applying radical acceptance to difficult situations and explore strategies to overcome them                                    Service Modality:  Video Visit     Telemedicine Visit: The patient's condition can be safely assessed and treated via synchronous audio and visual telemedicine encounter.      Reason for Telemedicine Visit: Services only offered telehealth    Originating Site (Patient Location): Patient's home    Distant Site (Provider Location): Provider Remote Setting- Home Office    Consent:  The patient/guardian has verbally consented to: the potential risks and benefits of telemedicine (video visit) versus in person care; bill my insurance or make self-payment for services provided; and responsibility  for payment of non-covered services.     Patient would like the video invitation sent by:  My Chart    Mode of Communication:  Video Conference via Medical Zoom    As the provider I attest to compliance with applicable laws and regulations related to telemedicine.           Patient Participation / Response:  Moderately participated, sharing some personal reflections / insights and adequately adequately received / provided feedback with other participants.    Demonstrated knowledge of when to consider using a variety of coping skills in daily life    Treatment Plan:  Patient has a current master individualized treatment plan.  See Epic treatment plan for more information.    Liliya Oh, WALLYSW

## 2021-08-02 ENCOUNTER — HOSPITAL ENCOUNTER (OUTPATIENT)
Dept: BEHAVIORAL HEALTH | Facility: CLINIC | Age: 56
End: 2021-08-02
Attending: PSYCHIATRY & NEUROLOGY
Payer: COMMERCIAL

## 2021-08-02 PROCEDURE — 90853 GROUP PSYCHOTHERAPY: CPT | Mod: GT | Performed by: PSYCHOLOGIST

## 2021-08-02 ASSESSMENT — PATIENT HEALTH QUESTIONNAIRE - PHQ9
5. POOR APPETITE OR OVEREATING: MORE THAN HALF THE DAYS
SUM OF ALL RESPONSES TO PHQ QUESTIONS 1-9: 19

## 2021-08-02 ASSESSMENT — ANXIETY QUESTIONNAIRES
IF YOU CHECKED OFF ANY PROBLEMS ON THIS QUESTIONNAIRE, HOW DIFFICULT HAVE THESE PROBLEMS MADE IT FOR YOU TO DO YOUR WORK, TAKE CARE OF THINGS AT HOME, OR GET ALONG WITH OTHER PEOPLE: VERY DIFFICULT
GAD7 TOTAL SCORE: 9
2. NOT BEING ABLE TO STOP OR CONTROL WORRYING: MORE THAN HALF THE DAYS
1. FEELING NERVOUS, ANXIOUS, OR ON EDGE: MORE THAN HALF THE DAYS
6. BECOMING EASILY ANNOYED OR IRRITABLE: NOT AT ALL
5. BEING SO RESTLESS THAT IT IS HARD TO SIT STILL: SEVERAL DAYS
3. WORRYING TOO MUCH ABOUT DIFFERENT THINGS: MORE THAN HALF THE DAYS
7. FEELING AFRAID AS IF SOMETHING AWFUL MIGHT HAPPEN: NOT AT ALL

## 2021-08-02 NOTE — ADDENDUM NOTE
Encounter addended by: Josefina Beauchamp PsyD on: 8/2/2021 3:28 PM   Actions taken: Flowsheet accepted Sski Pregnancy And Lactation Text: This medication is Pregnancy Category D and isn't considered safe during pregnancy. It is excreted in breast milk.

## 2021-08-02 NOTE — PATIENT INSTRUCTIONS
Nice to see you today Les. As we discussed:   -increase quetiapine 150mg   - Follow-up with fasting blood draw to monitor quetiapine  -Call DBT referrals    DBT & EMDR Specialist   [facility]   [address], [address]   [place] [address]   [phone]   [email or web address]     [facility]   [address], [address]   [place] [address]   [phone]   [email or web address]     [facility]   [address], [address]   [place] [address]   [phone]   [email or web address]       **For crisis resources, please see the information at the end of this document**     Patient Education      Thank you for coming to the [facility] [place] [facility].    Lab Testing:  If you had lab testing today and your results are reassuring or normal they will be mailed to you or sent through [facility] within 7 days. If the lab tests need quick action we will call you with the results. The phone number we will call with results is # [phone] (home) . If this is not the best number please call our clinic and change the number.    Medication Refills:  If you need any refills please call your pharmacy and they will contact us. Our fax number for refills is [phone]. Please allow three business for refill processing. If you need to  your refill at a new pharmacy, please contact the new pharmacy directly. The new pharmacy will help you get your medications transferred.     Scheduling:  If you have any concerns about today's visit or wish to schedule another appointment please call our office during normal business hours [phone] (8-5:00 M-F)    Contact Us:  Please call [phone] during business hours (8-5:00 M-F).  If after clinic hours, or on the weekend, please call  [phone].    Financial Assistance [phone]  [facility] Billing [phone]  [facility]: [phone]  [facility] [phone]  Medical Records [phone]  [facility] Patient Bill of Rights [email or web address]       MENTAL HEALTH CRISIS NUMBERS:  For a medical emergency please call  911 or go to the nearest ER.     [facility]:   [facility] -[phone]   Crisis Residence [facility]idence -[phone]   Walk-In Counseling Center [facility] -[phone]   COPE 24/7 [place] Mobile Team -[phone] (adults)/[phone] (child)  CHILD: [facility] needs assessment team - [phone]      [facility]:   [facility] - [phone]   Walk-in counseling [facility] - [phone]   Walk-in counseling [facility] - [phone]   Crisis Residence [facility] Residence - [phone]  Urgent Care Adult Mental [facility]-[phone] mobile unit/ 24/7 crisis line    National Crisis Numbers:   National Suicide Prevention Lifeline: 0-814-306-TALK (238-844-8831)  Poison Control Center - 8-691-989-2023  8020 [employer]/resources for a list of additional resources (SOS)  Trans Lifeline a hotline for transgender people 5-009-314-3125  The Tu Project a hotline for LGBT youth 6-859-916-9375  Crisis Text Line: For any crisis 24/7   To: 033535  see www.crisistextline.org  - IF MAKING A CALL FEELS TOO HARD, send a text!         Again thank you for choosing [facility] M[facility]AL HE[facility] & ADDICTION [facility] and please let us know how we can best partner with you to improve you and your family's health.    You may be receiving a survey regarding this appointment. We would love to have your feedback, both positive and negative. The survey is done by an external company, so your answers are anonymous.

## 2021-08-02 NOTE — GROUP NOTE
Psychotherapy Group Note                                    Service Modality:  Video Visit     Telemedicine Visit: The patient's condition can be safely assessed and treated via synchronous audio and visual telemedicine encounter.      Reason for Telemedicine Visit: Patient has requested telehealth visit, Patient unable to travel, Patient convenience (e.g. access to timely appointments / distance to available provider), Patient lives in a designated Health Professional Shortage Area (HPSA), and Services only offered telehealth    Originating Site (Patient Location): Patient's home    Distant Site (Provider Location): [facility]: [employer], [facility]    Consent:  The patient/guardian has verbally consented to: the potential risks and benefits of telemedicine (video visit) versus in person care; bill my insurance or make self-payment for services provided; and responsibility for payment of non-covered services.     Patient would like the video invitation sent by:  My Chart    Mode of Communication:  Video Conference via Medical Zoom    As the provider I attest to compliance with applicable laws and regulations related to telemedicine.        PATIENT'S NAME: Jailene Breen  MRN:   [id number]  :   1965  ACCT. NUMBER: [id number]  DATE OF SERVICE: 21  START TIME: 11:00 AM  END TIME: 11:50 AM  FACILITATOR: Josefina Beauchamp PsyD  TOPIC:  EBP Group: Emotions Management  [facility] Adult Mental Health Day Treatment  TRACK: 2A    NUMBER OF PARTICIPANTS: 7    Summary of Group / Topics Discussed:  Emotions Management: Mood Tracking: Patients discussed and reviewed different resources to track one s mood, with a goal of identifying patterns and correlations between different factors and mood state.  Patients discussed ways to increase awareness of one s mood and how it may be impacted by environmental factors, diet, activity level, medication, etc. The group shared their experiences and  thought processes for feedback.      Patient Session Goals / Objectives:    Increase awareness of daily mood patterns/changes    Report out identified factors that impact their mood    Demonstrate understanding of how to use different resources to track mood, and effectively use these to help manage symptoms      Patient Participation / Response:  Fully participated with the group by sharing personal reflections / insights and openly received / provided feedback with other participants.    Self-aware of experiences with difficult emotions, and strategies to employ to manage them    Treatment Plan:  Patient has a current master individualized treatment plan.  See Epic treatment plan for more information.    Alisia Woodward Psy., D,  Licensed Clinical Psychologist

## 2021-08-02 NOTE — ADDENDUM NOTE
Encounter addended by: Josefina Beauchamp PsyD on: 8/2/2021 12:58 PM   Actions taken: Flowsheet accepted

## 2021-08-02 NOTE — GROUP NOTE
Process Group Note                                    Service Modality:  Video Visit     Telemedicine Visit: The patient's condition can be safely assessed and treated via synchronous audio and visual telemedicine encounter.      Reason for Telemedicine Visit: Patient has requested telehealth visit, Patient unable to travel, Patient convenience (e.g. access to timely appointments / distance to available provider), Patient lives in a designated Health Professional Shortage Area (HPSA), and Services only offered telehealth    Originating Site (Patient Location): Patient's home    Distant Site (Provider Location): [facility]: [facility], [facility]    Consent:  The patient/guardian has verbally consented to: the potential risks and benefits of telemedicine (video visit) versus in person care; bill my insurance or make self-payment for services provided; and responsibility for payment of non-covered services.     Patient would like the video invitation sent by:  My Chart    Mode of Communication:  Video Conference via Medical Zoom    As the provider I attest to compliance with applicable laws and regulations related to telemedicine.        PATIENT'S NAME: Jailene Breen  MRN:   [id number]  :   1965  ACCT. NUMBER: [id number]  DATE OF SERVICE: 21  START TIME: 10:00 AM  END TIME: 10:50 AM  FACILITATOR: Josefina Beauchamp PsyD  TOPIC:  Process Group    Diagnoses:    Major Depressive Disorder, recurrent, moderate  JEFF  Panic Disorder    Rheumatoid Arthritis    Fredo Hughes MD MD Orthopedics [address]  21   Phone: [phone]; Fax: [phone]      Aiden Resendez PA  Assigned PCP  2020   Namita Sharma MD Resident Student in organized health care education/training program 2020 End  20   Phone: [phone]; Fax: [phone]      Barbie Christie, PhD LP Psychologist Licensed Mental Health 2020 End  20   Phone:  [phone]; Fax: [phone]      Comment: supervising      Susana Pike APRN CNP Referring Physician Nurse Practitioner 08/18/2020 End  8/18/20   Phone: [phone]; Fax: [phone]      Raghu Parada MD  Assigned Rheumatology Provider  10/23/2020   10/28/20            Chanelle Solo MD MD Psychiatry 02/24/2021 End  2/24/21   Phone: [phone]; Fax: [phone]      Comment: Attending      Coffin, Richard Breyen, MD Resident Psychiatry 02/24/2021 End  2/24/21   Phone: [phone]; Fax: [phone]          [facility] Adult Mental Health Day Treatment  TRACK: 2A    NUMBER OF PARTICIPANTS: 8          Data:    Session content: At the start of this group, patients were invited to check in by identifying themselves, describing their current emotional status, and identifying issues to address in this group.   Area(s) of treatment focus addressed in this session included Symptom Management, Personal Safety and Community Resources/Discharge Planning.  Client reported being safe today.  Reported mood is depressed.    Goal for today is to attend therapy groups.  The client talked to the group about how he is depressed and yet excited that his 27 year old son is moving to [place] with his girlfriend. He reported grief and loss issues and talked about his childhood of having to leave home at age 20 years, and that he had difficulties with his parents.  He reported suicidal thoughts and feelings of worthlessness come daily, but he reported no intent or plan to harm himself. He stated that he is applying for disability.      Therapeutic Interventions/Treatment Strategies:  Psychotherapist reinforced use of skills. Treatment modalities used include Cognitive Behavioral Therapy and Dialectical Behavioral Therapy. Interventions include Cognitive Restructuring:  Facilitated recognition of the connection between negative thoughts and negative core beliefs, Coping Skills: Discussed how the use of  "intentional \"in the moment\" actions can help reduce distress, Relapse Prevention: Coached on skills to manage factors that contribute to relapse and Mindfulness: Encouraged a plan to use mindfulness skills in daily life.    Assessment:    Patient response:   Patient responded to session by focusing on goals, being attentive and appearing alert    Possible barriers to participation / learning include: severity of symptoms    Health Issues:   Yes: Pain, Associated Psychological Distress       Substance Use Review:   Substance Use: No active concerns identified.    Mental Status/Behavioral Observations  Appearance:   Appropriate   Eye Contact:   Good   Psychomotor Behavior: Normal   Attitude:   Cooperative   Orientation:   All  Speech   Rate / Production: Normal    Volume:  Normal   Mood:    Anxious  Depressed  Sad   Affect:    Constricted   Thought Content:   Rumination  Thought Form:  Coherent  Logical     Insight:    Good     Plan:     Safety Plan: Recommended that patient call 911 or go to the local ED should there be a change in any of these risk factors.     Barriers to treatment: None identified    Patient Contracts (see media tab):  None    Substance Use: Provided encouragement towards sobriety     Continue or Discharge: Patient will continue in Adult Day Treatment (ADT)  as planned. Patient is likely to benefit from learning and using skills as they work toward the goals identified in their treatment plan.      Josefina Beauchamp PsyD  August 2, 2021  Elpidio Blake, D,  Licensed Clinical Psychologist    "

## 2021-08-02 NOTE — PROGRESS NOTES
"VIDEO VISIT  Jailene Breen is a 55 year old patient who is being evaluated via a billable video visit.      The patient has been notified of following:   \"We have found that certain health care needs can be provided without the need for an in-person physical exam. This service lets us provide the care you need with a video conversation. If a prescription is necessary we can send it directly to your pharmacy. If lab work is needed we can place an order for that and you can then stop by our lab to have the test done at a later time. Insurers are generally covering virtual visits as they would in-office visits so billing should not be different than normal.  If for some reason you do get billed incorrectly, you should contact the billing office to correct it and that number is in the AVS .    Patient has given verbal consent for video visit?: Yes   How would you like to obtain your AVS?: [employer]  AVS SmartPhrase [PsychAVS] has been placed in 'Patient Instructions': Yes      Video- Visit Details  Type of service:  video visit for medication management  Time of service:    Date:  08/03/2021    Video Start Time:  9:08 AM        Video End Time:  9:38 AM    Reason for video visit:  Patient unable to travel due to Covid-19  Originating Site (patient location):  [facility]   Location- Patient's home  Distant Site (provider location):  Remote location  Mode of Communication:  Video Conference via AmWell  Consent:  Patient has given verbal consent for video visit?: Yes        [facility]  Psychiatry Clinic  PSYCHIATRY PROGRESS NOTE     CARE TEAM:    PCP- Aiden Resendez   Rheumatology- Raghu Parada MD  [facility]- Susana Pike APRN; Santa Farrell PsyD    Jailene Breen is a 55 year old patient who prefers the name Les and uses pronouns he, him, his.   PERTINENT BACKGROUND                                 [most recent eval 09/21/20]   Psych pertinent " item history includes suicidal ideation, SIB [cutting], mutiple psychotropic trials , severe med reaction, psych hosp (<3), SUBSTANCE USE: alcohol, substance use treatment  and Major Medical Problems (Rheumatoid Arthritis and Ankylosing Spondylitis)    DIAGNOSES                       Major Depressive Disorder, recurrent, severe, without psychotic features  Generalized Anxiety Disorder, with panic    Chronic Pain  Ankylosing Spondylitis    ASSESSMENT                       Today, continues to report interim improvement in depression and resolution of suicidal ideation since last appointment. Continues to have moderate to severe depression symptoms despite this improvement. However, has several stressors including upcoming school year and son moving out, which he has been handling well. He continues with outpatient therapy group, which seems to have provided considerable benefit in processing recent stressors and would advocate for continuation of this therapy group unless he were able to initiate DBT, referrals provided today. Discussed increased quetiapine versus fluoxetine to target depression, anxiety, and loss of appetite; elected to increase quetiapine as he had significant concern about decreased appetite and weight loss. Discussed that this is not a first line treatment with the patient who expressed understanding and still expressed preference for this treatment. If no improvement in symptoms over the next several weeks would increase fluoxetine to maximize that therapy prior to additional increases in quetiapine. If significant improvements with increased quetiapine would consider further dose escalation. Patient obtained home BP cuff, normal HR and BP despite earlier concern for hypotension with current medications. Patient plans to obtain antipsychotic monitoring labs, forgot following last appointment as he was waiting for other follow-up appointments (rheumatology) prior to completing lab draw. No safety  "concerns. Refills provided.     [place] was checked today:  Indicates no medication misuse .    PLAN                                                                                   1) Medications:  - Continue fluoxetine 40mg daily   - Increase quetiapine 150mg at bedtime  - Continue quetiapine 25-50mg as needed for anxiety and panic  - Continue prazosin 1mg at bedtime   - Continue liothyronine 25mcg daily    2) Therapy- Participating in group therapy; working on setting up individual and DBT    3) Next Due   Labs- AP monitoring labs due   EKG- as needed  Rating scales- serial PHQ9s    4) Referrals  Therapy- DBT therapy referrals given    5) RTC: 4-6 weeks    6) Crisis Numbers:   Provided routinely in AVS     After hours:  [phone]    INTERIM HISTORY        The patient reports good treatment adherence.  History was provided by the patient who was a good historian.     Since the last visit:   -Patient nearing the end of his outpatient group therapy. Groups do help, \"I do like talking and it does help some.\"  -Followed-up with rheumatology, recommended continuing current regimen and follow-up MRI.   -Got a wrist BP cuff, last measured 138/80 and HR 63.  -Today, \"son's moving out of the house to [place]. First time he's moving out of the house\" (27 years old). Knows it's good  \"but hard.\"   -Daughter (25) still living at home, but thinking about moving out.  -\"okay\", \"kind of down\" still having \"worthless feeling.\"  -Do still feel \"bad every once and a while.\"  -Still \"don't feel like doing a whole lot.\" Got out on Sunday, \"felt good\" to \"get up and get out.\"  -\"Still low\" motivation, energy, memory, and concentration.  -No suicidal thoughts \"or plan or anything.\"  -Noticed appetite has been decreased over the last few months, lost 25lbs over last 1-2 months, (now ~250lbs). \"I just don't have any interest in food.\" Has had a couple episodes of nausea with vomiting, has not been able to associate this with anything, " "other than the last time it occurred was following his suboxone dose.  -Quetiapine \"does help with panic attacks, but then I get tired for quite a while afterward.\"  -Has October court case for disability.  -Plans to get AP labs done.    RECENT PSYCH ROS:   Depression: As above   Elevated:  none  Psychosis:  none  Anxiety:  as above  Trauma Related:  none  Dysregulation:  none  Eating Disorder: no  Other: no    Adverse Effects:  denies  Pertinent Negative Symptoms: No violent ideation, aggression, psychosis, hallucinations, delusions, otto and hypomania    Recent Substance Use:     Alcohol- none since , used to attend AA meetings prior to COVID.  Tobacco- denies  Caffeine- yes  Cannabis- denies     Opioids- as prescribed           Narcan Kit- prescribed   Other illicit drugs- none    FAMILY and SOCIAL HISTORY                                      [per patient report]            Family Hx:  Mom - depression (since  when sister )    Social Hx:  Financial/ Work- currently attending college at [employer], is hoping to start school at [employer] for a degree in psychology after being unable to work due to diagnosis of rheumatoid arthritis and ankylosing spondylitis  Partner/ -  in .  Children- 25 and 27 year old kids      Living situation- [place], house with wife and 2 kids      Social/ Spiritual Support- Talks to  every two weeks for lunch, but \"doesn't really have friends\". Family is supportive.      PSYCH and SUBSTANCE USE Critical Summary Points since  - started escitalopram  November - increased escitalopram 20mg daily  February - stopped hydroxyzine; started trazodone 50mg at bedtime and 25mg daily as needed for anxiety   - Patient hospitalized started on Abilify, titrated to 5mg daily; escitalopram switched to fluoxetine 40mg daily; started prazosin 1mg at bedtime. Pain management switched oxycodone to Suboxone and started medical " marijuana.  May - patient still taking hydroxyzine, discontinued today.  June - patient hospitalized (6/15/21) discontinued Abilify and trazodone, started on quetiapine & titrated to 100mg at bedtime and 25mg as needed for sleep.   August - increase quetiapine    MEDICAL HISTORY  and ALLERGY     ALLERGIES: Mirtazapine, Hydrocodone, Ms contin [morphine], and Remeron soltab     Patient Active Problem List   Diagnosis     CARDIOVASCULAR SCREENING; LDL GOAL LESS THAN 160     Anxiety     Overweight (BMI 25.0-29.9)     Back pain     Tear meniscus knee, right, initial encounter     Rheumatoid arthritis involving multiple sites, unspecified rheumatoid factor presence     Chronic pain     Right-sided thoracic back pain, unspecified chronicity     JEFF (generalized anxiety disorder)     Panic attack     Syncope, unspecified syncope type     Ankylosing spondylitis (H)     Tobacco use disorder     Moderate major depression (H)     Immunocompromised (H)     Essential hypertension     Suicidal ideation     Insomnia due to other mental disorder     Suicide ideation     Depression, unspecified depression type     Major depressive disorder, recurrent episode, severe with mixed features (H)         MEDICAL REVIEW OF SYSTEMS     Contraception- none  A comprehensive review of systems was performed and is negative other than noted in the HPI.    MEDICATIONS          Current Outpatient Medications   Medication Sig Dispense Refill     acetaminophen (TYLENOL) 500 MG tablet Take 1,000 mg by mouth every 6 hours as needed for mild pain (headache)       atenolol (TENORMIN) 25 MG tablet Take 1 tablet (25 mg) by mouth daily 90 tablet 1     buprenorphine HCl-naloxone HCl (SUBOXONE) 8-2 MG per film Use 0.5 film under the tongue 4 times per day for pain, approximately every 6 hours. Max 2 films per day. Fill 7/9/21 and begin 7/9/2021.  30 day supply 60 each 0     chlorzoxazone (PARAFON FORTE) 500 MG tablet Take 1 tablet (500 mg) by mouth 3 times  daily as needed for muscle spasms 45 tablet 1     clonazePAM (KLONOPIN) 1 MG tablet TAKE ONE-HALF - 1 TABLET BY MOUTH ONCE DAILY AS NEEDED FOR ANXIETY 5 tablet 0     etanercept (ENBREL SURECLICK) 50 MG/ML autoinjector Inject 50 mg Subcutaneous once a week 1 mL 3     fish oil-omega-3 fatty acids 1000 MG capsule Take 1 g by mouth daily       FLUoxetine (PROZAC) 40 MG capsule Take 1 capsule (40 mg) by mouth daily 30 capsule 1     folic acid (FOLVITE) 1 MG tablet Take 5 tablets (5 mg) by mouth daily 150 tablet 3     liothyronine (CYTOMEL) 25 MCG tablet Take 1 tablet (25 mcg) by mouth daily 30 tablet 1     medical cannabis (Patient's own supply) See Admin Instructions (The purpose of this order is to document that the patient reports taking medical cannabis.  This is not a prescription, and is not used to certify that the patient has a qualifying medical condition.)     Pills PRN   Lozenges PRN       methotrexate sodium 2.5 MG TABS TAKE 4 TABLETS BY MOUTH EVERY 7 DAYS 48 tablet 0     multivitamin (CENTRUM SILVER) tablet Take 1 tablet by mouth daily       nicotine polacrilex (NICORETTE) 4 MG gum PLACE 1 PIECE INSIDE CHEEK AS NEEDED FOR SMOKING CESSATION 200 each 1     omeprazole (PRILOSEC) 20 MG DR capsule Take 20 mg by mouth daily as needed        prazosin (MINIPRESS) 1 MG capsule Take 1 capsule (1 mg) by mouth At Bedtime 30 capsule 1     QUEtiapine (SEROQUEL) 100 MG tablet Take 1 tablet (100 mg) by mouth At Bedtime 30 tablet 1     QUEtiapine (SEROQUEL) 25 MG tablet Take 1-2 tablets (25-50 mg) by mouth daily as needed (for severe anxiety/panic attacks or sleep) 30 tablet 0     vitamin D2 (ERGOCALCIFEROL) 58237 units (1250 mcg) capsule Take 50,000 Units by mouth twice a week mon and thurs       VITALS                     There were no vitals taken for this visit.   MENTAL STATUS EXAM         Alertness: alert  and oriented  Appearance: adequately groomed  Behavior/Demeanor: cooperative, with good  eye contact   Speech:  "regular rate and rhythm  Language: intact  Psychomotor: normal or unremarkable  Mood: \"okay\"   Affect: appropriate; congruent to: mood- yes, content- yes  Thought Process/Associations: unremarkable  Thought Content:  Reports none;  Denies suicidal ideation, violent ideation and delusions   Perception:  Reports none;  Denies auditory hallucinations and visual hallucinations  Insight: good  Judgment: good  Cognition: (6) oriented: time, person, and place  attention span: intact  concentration: intact  recent memory: intact  remote memory: intact  fund of knowledge: appropriate  Gait and Station: N/A (telehealth)    LABS and DATA     PHQ9 TODAY = not completed due to telehealth  PHQ 3/18/2021 3/30/2021 8/2/2021   PHQ-9 Total Score 20 16 19   Q9: Thoughts of better off dead/self-harm past 2 weeks More than half the days Several days More than half the days   F/U: Thoughts of suicide or self-harm - Yes -   F/U: Self harm-plan - No -   F/U: Self-harm action - No -   F/U: Safety concerns - No -       Recent Labs   Lab Test 06/16/21  0741 03/21/21  0808   CR 1.09 0.92   GFRESTIMATED 77 >90     Recent Labs   Lab Test 06/16/21  0741 03/21/21  0808   AST 23 20   ALT 33 34   ALKPHOS 88 100     Recent Labs   Lab Test 06/16/21  0741 03/21/21  0808   GLC 92 95     No lab results found.  Recent Labs   Lab Test 06/16/21  0741 03/21/21  0808   AST 23 20   ALT 33 34   ALKPHOS 88 100     Recent Labs   Lab Test 06/16/21  0741 03/21/21  0808 03/17/21  0748   WBC 6.1 6.8 8.3   ANEU  --  3.7 4.4   HGB 14.6 15.6 15.0    246 211     PSYCHOTROPIC DRUG INTERACTIONS   Increased risk of QTc prolongation: fluoxetine, quetiapine, Suboxone  Increased risk of serotonin syndrome: fluoxetine, Suboxone  Increased risk of CNS/respiratory depression: Suboxone, quetiapine    MANAGEMENT:  Monitoring for adverse effects and patient is aware of risks    RISK STATEMENT for SAFETY     Today, Jamison Breen does not report any suicidal ideation and does not " appear to be an imminent risk to self or others.    SUICIDE RISK ASSESSMENT-  Risk factors for self-harm: previous suicide attempt, recent psych inpt , financial/legal stress, significant pain and takes opiates.  Mitigating factors: h/o seeking help , commitment to family and participation in DBT or day program.  The patient does not appear to be at imminent risk for self-harm, hospitalization is recommended which the pt does not agree to. No hospitalization will be arranged. Safety plan reviewed today including contacting family, friends (), day treatment team, clinic, or crisis line.    TREATMENT RISK STATEMENT:  The risks, benefits, alternatives and potential adverse effects have been discussed and are understood by the pt. The pt understands the risks of using street drugs or alcohol. There are no medical contraindications, the pt agrees to treatment with the ability to do so. The pt knows to call the clinic for any problems or to access emergency care if needed.  Medical and substance use concerns are documented above.  Psychotropic drug interaction check was done, including changes made today.    PROVIDER: Slim Sage MD    Patient was not staffed.  Supervisor is Dr. Downing who will sign the note.

## 2021-08-03 ENCOUNTER — VIRTUAL VISIT (OUTPATIENT)
Dept: PSYCHIATRY | Facility: CLINIC | Age: 56
End: 2021-08-03
Attending: PSYCHIATRY & NEUROLOGY
Payer: COMMERCIAL

## 2021-08-03 ENCOUNTER — TELEPHONE (OUTPATIENT)
Dept: PSYCHIATRY | Facility: CLINIC | Age: 56
End: 2021-08-03

## 2021-08-03 ENCOUNTER — HOSPITAL ENCOUNTER (OUTPATIENT)
Dept: BEHAVIORAL HEALTH | Facility: CLINIC | Age: 56
End: 2021-08-03
Attending: PSYCHIATRY & NEUROLOGY
Payer: COMMERCIAL

## 2021-08-03 DIAGNOSIS — F41.9 ANXIETY: ICD-10-CM

## 2021-08-03 DIAGNOSIS — F99 INSOMNIA DUE TO OTHER MENTAL DISORDER: ICD-10-CM

## 2021-08-03 DIAGNOSIS — F32.A DEPRESSION, UNSPECIFIED DEPRESSION TYPE: ICD-10-CM

## 2021-08-03 DIAGNOSIS — F51.05 INSOMNIA DUE TO OTHER MENTAL DISORDER: ICD-10-CM

## 2021-08-03 PROCEDURE — 99214 OFFICE O/P EST MOD 30 MIN: CPT | Mod: 95 | Performed by: STUDENT IN AN ORGANIZED HEALTH CARE EDUCATION/TRAINING PROGRAM

## 2021-08-03 PROCEDURE — 90853 GROUP PSYCHOTHERAPY: CPT | Mod: GT,95 | Performed by: PSYCHOLOGIST

## 2021-08-03 PROCEDURE — 90853 GROUP PSYCHOTHERAPY: CPT | Mod: GT | Performed by: PSYCHOLOGIST

## 2021-08-03 PROCEDURE — 90853 GROUP PSYCHOTHERAPY: CPT | Mod: GT | Performed by: COUNSELOR

## 2021-08-03 RX ORDER — FLUOXETINE 40 MG/1
40 CAPSULE ORAL DAILY
Qty: 30 CAPSULE | Refills: 1 | Status: SHIPPED | OUTPATIENT
Start: 2021-08-03 | End: 2021-09-14

## 2021-08-03 RX ORDER — LIOTHYRONINE SODIUM 25 UG/1
25 TABLET ORAL DAILY
Qty: 30 TABLET | Refills: 1 | Status: SHIPPED | OUTPATIENT
Start: 2021-08-03 | End: 2021-09-14

## 2021-08-03 RX ORDER — QUETIAPINE FUMARATE 25 MG/1
25-50 TABLET, FILM COATED ORAL DAILY PRN
Qty: 30 TABLET | Refills: 0 | Status: SHIPPED | OUTPATIENT
Start: 2021-08-03 | End: 2021-10-20

## 2021-08-03 RX ORDER — PRAZOSIN HYDROCHLORIDE 1 MG/1
1 CAPSULE ORAL AT BEDTIME
Qty: 30 CAPSULE | Refills: 1 | Status: SHIPPED | OUTPATIENT
Start: 2021-08-03 | End: 2021-09-14

## 2021-08-03 RX ORDER — QUETIAPINE FUMARATE 100 MG/1
150 TABLET, FILM COATED ORAL AT BEDTIME
Qty: 45 TABLET | Refills: 1 | Status: SHIPPED | OUTPATIENT
Start: 2021-08-03 | End: 2021-09-14

## 2021-08-03 ASSESSMENT — ANXIETY QUESTIONNAIRES: GAD7 TOTAL SCORE: 9

## 2021-08-03 NOTE — GROUP NOTE
Psychotherapy Group Note                                    Service Modality:  Video Visit     Telemedicine Visit: The patient's condition can be safely assessed and treated via synchronous audio and visual telemedicine encounter.      Reason for Telemedicine Visit: Patient has requested telehealth visit, Patient unable to travel, Patient convenience (e.g. access to timely appointments / distance to available provider), Patient lives in a designated Health Professional Shortage Area (HPSA), and Services only offered telehealth    Originating Site (Patient Location): Patient's home    Distant Site (Provider Location): [facility]: [facility], [facility]    Consent:  The patient/guardian has verbally consented to: the potential risks and benefits of telemedicine (video visit) versus in person care; bill my insurance or make self-payment for services provided; and responsibility for payment of non-covered services.     Patient would like the video invitation sent by:  My Chart    Mode of Communication:  Video Conference via Medical Zoom    As the provider I attest to compliance with applicable laws and regulations related to telemedicine.        PATIENT'S NAME: Jailene Breen  MRN:   [id number]  :   1965  ACCT. NUMBER: [id number]  DATE OF SERVICE: 21  START TIME: 10:00 AM  END TIME: 10:50 AM  FACILITATOR: Josefina Beauchamp PsyD  TOPIC:  EBP Group: Self-Awareness  [facility] Day Treatment  TRACK: 2A    NUMBER OF PARTICIPANTS: 8    Summary of Group / Topics Discussed:  Self-Awareness: Self-Compassion: Patients received overview of key concepts in developing self-compassion. Patients discussed mindfulness, self-kindness, and finding common humanity. Patients identified their current approach to problems in their lives and learned skills for increasing self-compassion. Patients identified ways they can put self-compassion skills into practice and problem solve barriers  to application of skills.     Patient Session Goals / Objectives:    [place] components of self-compassion    Identify ways to practice self-compassion in daily life    Problem solve barriers to self-compassion practice      Patient Participation / Response:  Fully participated with the group by sharing personal reflections / insights and openly received / provided feedback with other participants.    Demonstrated understanding of values, strengths, and challenges to learn about themselves and Identified / Expressed readiness to act intentionally, increase self-compassion, promote personal growth    Treatment Plan:  Patient has a current master individualized treatment plan.  See Epic treatment plan for more information.    Alisia Woodward Psy., D,  Licensed Clinical Psychologist

## 2021-08-03 NOTE — GROUP NOTE
Process Group Note                                    Service Modality:  Video Visit     Telemedicine Visit: The patient's condition can be safely assessed and treated via synchronous audio and visual telemedicine encounter.      Reason for Telemedicine Visit: Patient has requested telehealth visit, Patient unable to travel, Patient convenience (e.g. access to timely appointments / distance to available provider), Patient lives in a designated Health Professional Shortage Area (HPSA), and Services only offered telehealth    Originating Site (Patient Location): Patient's home    Distant Site (Provider Location): [facility]: [facility], [facility]    Consent:  The patient/guardian has verbally consented to: the potential risks and benefits of telemedicine (video visit) versus in person care; bill my insurance or make self-payment for services provided; and responsibility for payment of non-covered services.     Patient would like the video invitation sent by:  My Chart    Mode of Communication:  Video Conference via Medical Zoom    As the provider I attest to compliance with applicable laws and regulations related to telemedicine.        PATIENT'S NAME: Jailene Breen  MRN:   [id number]  :   1965  ACCT. NUMBER: [id number]  DATE OF SERVICE: 21  START TIME:  9:00 AM  END TIME:  9:50 AM  FACILITATOR: Josefina Beauchamp PsyD  TOPIC:  Process Group    Diagnoses:    296.32 Major Depressive Disorder, recurrent, moderate  300.022 JEFF  300.01  Panic Disorder     Ankylosing Spondylitis   Rheumatoid Arthritis    Fredo Hughes MD MD Orthopedics 2016 End  21   Phone: [phone]; Fax: [phone]      Aiden Resendez PA  Assigned PCP  2020   Namita Sharma MD Resident Student in organized health care education/training program 2020 End  20   Phone: [phone]; Fax: [phone]      Barbie Christie, PhD LP Psychologist Licensed  Mental Health 08/18/2020 End  8/18/20   Phone: [phone]; Fax: [phone]      Comment: supervising      Susana Pike APRN CNP Referring Physician Nurse Practitioner 08/18/2020 End  8/18/20   Phone: [phone]; Fax: [phone]      Raghu Parada MD  Assigned Rheumatology Provider  10/23/2020   10/28/20            Chanelle Solo MD MD Psychiatry 02/24/2021 End  2/24/21   Phone: [phone]; Fax: [phone]      Comment: Attending      Coffin, Richard Breyen, MD Resident Psychiatry 02/24/2021 End  2/24/21   Phone: [phone]; Fax: [phone]            [facility] Day Treatment  TRACK: 2A    NUMBER OF PARTICIPANTS: 7          Data:    Session content: At the start of this group, patients were invited to check in by identifying themselves, describing their current emotional status, and identifying issues to address in this group.   Area(s) of treatment focus addressed in this session included Symptom Management, Personal Safety and Community Resources/Discharge Planning.  Client reported being safe today.  Reported mood is anxious.   Goal for today is to attend group therapy. The client talked to the group about how he is sad and anxious about his 27 year old son leaving home. He talked with the group about changes in life  As well as his dr appointment this morning, and was placed on Seroquel 150 mg to increase his appetite.  He reported that he lost 25# in the past month, but is in average weight range for his height.  He reported ongoing suicidal thoughts, but no intent or plan to harm himself.    Therapeutic Interventions/Treatment Strategies:  Psychotherapist offered support, feedback and validation.    Assessment:    Patient response:   Patient responded to session by listening, focusing on goals and being attentive    Possible barriers to participation / learning include: severity of symptoms    Health Issues:   None reported       Substance Use  Review:   Substance Use: No active concerns identified.    Mental Status/Behavioral Observations  Appearance:   Appropriate   Eye Contact:   Good   Psychomotor Behavior: Normal   Attitude:   Cooperative   Orientation:   All  Speech   Rate / Production: Normal    Volume:  Normal   Mood:    Anxious  Depressed   Affect:    Constricted   Thought Content:   Rumination  Thought Form:  Coherent  Logical     Insight:    Good     Plan:     Safety Plan: Recommended that patient call 911 or go to the local ED should there be a change in any of these risk factors.     Barriers to treatment: None identified    Patient Contracts (see media tab):  None    Substance Use: Provided encouragement towards sobriety     Continue or Discharge: Patient will continue in Adult Day Treatment (ADT)  as planned. Patient is likely to benefit from learning and using skills as they work toward the goals identified in their treatment plan.      Josefina Beauchamp PsyD  August 3, 2021  Elpidio Blake, D,  Licensed Clinical Psychologist

## 2021-08-03 NOTE — TELEPHONE ENCOUNTER
On August 3, 2021, at 8:39 AM, writer called patient at [phone] to confirm Virtual Visit. Writer unable to make contact with patient. Writer left detailed voice message for call back. [phone] left as call back number. Alyssa Koch, [facility]

## 2021-08-04 NOTE — GROUP NOTE
Psychoeducation Group Note    PATIENT'S NAME: Jailene Breen  MRN:   [id number]  :   1965  ACCT. NUMBER: [id number]  DATE OF SERVICE: 21  START TIME: 11:00 AM  END TIME: 11:50 AM  FACILITATOR: Yordy White LMFT  TOPIC: ALTAF RN Group: Mind/Body Practice & Complementary  [facility] Adult Mental Health Day Treatment  TRACK: 2A    NUMBER OF PARTICIPANTS: 8    Summary of Group / Topics Discussed:  Mind/Body Practice & Complementary Therapies:  Progressive Muscle Relaxation: In addition to affecting our mood and behavior, psychological stress can cause a myriad of physical symptoms in our body. Patients were educated on these effects and guided to increased self-awareness of how stress affects their body. The purpose, benefits, history, and techniques of progressive muscle relaxation were discussed. In an instructor guided experiential, patients were guided to practice PMR to help reduce physical symptoms of psychological stress and achieve a more balanced feeling of well-being.    Patient Session Goals / Objectives:  ? Identified physiological symptoms of stress on the body  ? Listed & Explained the purpose and benefits to practicing PMR   ? Practiced progressive muscle relaxation experiential    Service Modality:  Video Visit     Telemedicine Visit: The patient's condition can be safely assessed and treated via synchronous audio and visual telemedicine encounter.      Reason for Telemedicine Visit: Services only offered telehealth and due to COVID-19.    Originating Site (Patient Location): Patient's home    Distant Site (Provider Location): Provider Remote Setting- Home Office    Consent:  The patient/guardian has verbally consented to: the potential risks and benefits of telemedicine (video visit) versus in person care; bill my insurance or make self-payment for services provided; and responsibility for payment of non-covered services.     Patient would like the video invitation sent by:  My  Chart    Mode of Communication:  Video Conference via Medical Zoom    As the provider I attest to compliance with applicable laws and regulations related to telemedicine.      Patient Participation / Response:  Moderately participated, sharing some personal reflections / insights and adequately adequately received / provided feedback with other participants.    Demonstrated understanding of topics discussed through group discussion and participation, Identified / Expressed personal readiness to practice skills and Verbalized understanding of Mind/Body Practice & Complementary Therapies topic    Treatment Plan:  Patient has a current master individualized treatment plan.  See Epic treatment plan for more information.    Yordy White LMFT

## 2021-08-05 ENCOUNTER — HOSPITAL ENCOUNTER (OUTPATIENT)
Dept: BEHAVIORAL HEALTH | Facility: CLINIC | Age: 56
End: 2021-08-05
Attending: PSYCHIATRY & NEUROLOGY
Payer: COMMERCIAL

## 2021-08-05 PROCEDURE — 90853 GROUP PSYCHOTHERAPY: CPT | Mod: GT | Performed by: COUNSELOR

## 2021-08-05 PROCEDURE — 90853 GROUP PSYCHOTHERAPY: CPT | Mod: GT,95 | Performed by: PSYCHOLOGIST

## 2021-08-05 NOTE — GROUP NOTE
Process Group Note                                    Service Modality:  Video Visit     Telemedicine Visit: The patient's condition can be safely assessed and treated via synchronous audio and visual telemedicine encounter.      Reason for Telemedicine Visit: Patient has requested telehealth visit, Patient unable to travel, Patient convenience (e.g. access to timely appointments / distance to available provider), Patient lives in a designated Health Professional Shortage Area (HPSA), and Services only offered telehealth    Originating Site (Patient Location): Patient's home    Distant Site (Provider Location): [facility]: [facility], [facility]    Consent:  The patient/guardian has verbally consented to: the potential risks and benefits of telemedicine (video visit) versus in person care; bill my insurance or make self-payment for services provided; and responsibility for payment of non-covered services.     Patient would like the video invitation sent by:  My Chart    Mode of Communication:  Video Conference via Medical Zoom    As the provider I attest to compliance with applicable laws and regulations related to telemedicine.        PATIENT'S NAME: Jailene Breen  MRN:   [id number]  :   1965  ACCT. NUMBER: [id number]  DATE OF SERVICE: 21  START TIME:  9:00 AM  END TIME:  9:50 AM  FACILITATOR: Josefina Beauchamp PsyD  TOPIC:  Process Group    Diagnoses:    296.32 Major Depressive Disorder, recurrent, moderate  300.022 JEFF  300.01  Panic Disorder     Ankylosing Spondylitis   Rheumatoid Arthritis    Fredo Hughes MD MD Orthopedics 2016 End  21   Phone: [phone]; Fax: [phone]      Aiden Resendez PA  Assigned PCP  2020   Namita Sharma MD Resident Student in organized health care education/training program 2020 End  20   Phone: [phone]; Fax: [phone]      Barbie Christie, PhD LP Psychologist Licensed  Mental Health 08/18/2020 End  8/18/20   Phone: [phone]; Fax: [phone]      Comment: supervising      Susana Pike APRN CNP Referring Physician Nurse Practitioner 08/18/2020 End  8/18/20   Phone: [phone]; Fax: [phone]      Raghu Parada MD  Assigned Rheumatology Provider  10/23/2020   10/28/20            Chanelle Solo MD MD Psychiatry 02/24/2021 End  2/24/21   Phone: [phone]; Fax: [phone]      Comment: Attending      Coffin, Richard Breyen, MD Resident Psychiatry 02/24/2021 End  2/24/21   Phone: [phone]; Fax: [phone]            [facility] Mental Health Day Treatment  TRACK: 2A    NUMBER OF PARTICIPANTS: 9          Data:    Session content: At the start of this group, patients were invited to check in by identifying themselves, describing their current emotional status, and identifying issues to address in this group.   Area(s) of treatment focus addressed in this session included Symptom Management, Personal Safety and Community Resources/Discharge Planning.  Client reported being safe today.  Reported mood is sad.   Goal for today is to attend therapy. The client talked to the group about how to manage his grief and loss about his son leaving to move to [place]. He reported that he has memories of leaving home when he ws 20 years old and didn't have a good relationship with his parents. He also talked about how he had an argument with his mom a year ago and hasn't talked to her since that time.       Therapeutic Interventions/Treatment Strategies:  Psychotherapist reinforced use of skills. Treatment modalities used include Cognitive Behavioral Therapy and Dialectical Behavioral Therapy. Interventions include Cognitive Restructuring:  Assisted patient in formulating new neutral/positive alternatives to challenge less helpful / ineffective thoughts and Facilitated recognition of the connection between negative thoughts and negative core beliefs, Relapse  Prevention: Coached on skills to manage factors that contribute to relapse and Mindfulness: Encouraged a plan to use mindfulness skills in daily life.    Assessment:    Patient response:   Patient responded to session by focusing on goals, being attentive and appearing alert    Possible barriers to participation / learning include: severity of symptoms    Health Issues:   Yes: Pain, Associated Psychological Distress       Substance Use Review:   Substance Use: No active concerns identified.    Mental Status/Behavioral Observations  Appearance:   Appropriate   Eye Contact:   Good   Psychomotor Behavior: Normal   Attitude:   Cooperative   Orientation:   All  Speech   Rate / Production: Normal    Volume:  Normal   Mood:    Depressed  Sad   Affect:    Constricted   Thought Content:   Rumination  Thought Form:  Coherent  Logical     Insight:    Good     Plan:     Safety Plan: Recommended that patient call 911 or go to the local ED should there be a change in any of these risk factors.     Barriers to treatment: None identified    Patient Contracts (see media tab):  None    Substance Use: Provided encouragement towards sobriety     Continue or Discharge: Patient will continue in Adult Day Treatment (ADT)  as planned. Patient is likely to benefit from learning and using skills as they work toward the goals identified in their treatment plan.      Josefina Beauchamp PsyD  August 5, 2021  Elpidio Blake, D,  Licensed Clinical Psychologist

## 2021-08-05 NOTE — GROUP NOTE
Psychotherapy Group Note                                    Service Modality:  Video Visit     Telemedicine Visit: The patient's condition can be safely assessed and treated via synchronous audio and visual telemedicine encounter.      Reason for Telemedicine Visit: Patient has requested telehealth visit, Patient unable to travel, Patient convenience (e.g. access to timely appointments / distance to available provider), Patient lives in a designated Health Professional Shortage Area (HPSA), and Services only offered telehealth    Originating Site (Patient Location): Patient's home    Distant Site (Provider Location): [facility]: [facility], [facility]    Consent:  The patient/guardian has verbally consented to: the potential risks and benefits of telemedicine (video visit) versus in person care; bill my insurance or make self-payment for services provided; and responsibility for payment of non-covered services.     Patient would like the video invitation sent by:  My Chart    Mode of Communication:  Video Conference via Medical Zoom    As the provider I attest to compliance with applicable laws and regulations related to telemedicine.        PATIENT'S NAME: Jailene Breen  MRN:   [id number]  :   1965  ACCT. NUMBER: [id number]  DATE OF SERVICE: 21  START TIME: 11:00 AM  END TIME: 11:50 AM  FACILITATOR: Josefina Beauchamp PsyD  TOPIC:  EBP Group: Self-Awareness  [facility] Adult Mental Health Day Treatment  TRACK: 2A    NUMBER OF PARTICIPANTS: 9    Summary of Group / Topics Discussed:  Self-Awareness: Personal Strengths: Topic focused on assisting patients in identifying personal strengths and how they relate to the management of mental health symptoms. Patients discussed the benefits of acknowledging their personal strengths and their impact on mood improvement, mindfulness, and perspective. Patients worked to increase time spent on recognition and appreciation of what is positive  and working in their lives. The goal is to reduce rumination and negative thinking resulting in increased mindfulness and resilience. Patients will work to put skills into practice and problem-solve barriers.     Patient Session Goals / Objectives:    Identified personal strengths    Identified barriers to recognition of personal strengths    Verbalized understanding of strategies to increase use of their strengths in management of daily symptoms      Patient Participation / Response:  Fully participated with the group by sharing personal reflections / insights and openly received / provided feedback with other participants.    Identified / Expressed readiness to act intentionally, increase self-compassion, promote personal growth    Treatment Plan:  Patient has a current master individualized treatment plan.  See Epic treatment plan for more information.    Alisia Woodward Psy., D,  Licensed Clinical Psychologist

## 2021-08-06 NOTE — ADDENDUM NOTE
Encounter addended by: Yordy White LMFT on: 8/5/2021 10:37 PM   Actions taken: Charge Capture section accepted

## 2021-08-06 NOTE — GROUP NOTE
Psychoeducation Group Note    PATIENT'S NAME: Jailene Breen  MRN:   [id number]  :   1965  ACCT. NUMBER: [id number]  DATE OF SERVICE: 21  START TIME: 10:00 AM  END TIME: 10:50 AM  FACILITATOR: Yordy White LMFT  TOPIC: ALTAF RN Group: Health Maintenance  [facility] Adult Mental Health Day Treatment  TRACK: 2A    NUMBER OF PARTICIPANTS: 9    Summary of Group / Topics Discussed:  Health Maintenance: Weekend planning: Patients were given time to complete a weekend plan of what they will do to promote wellness and sobriety over the weekend when they do not have the structure of group. Patients were encouraged to review progress on their treatment goals and were challenged to identify ways to work toward meeting them. Patients identified and discussed foreseeable barriers to success over the weekend and then developed a plan to overcome them. Patients reviewed their distress coping skills and social support network and discussed this with the group.       Patient Session Goals / Objectives:    ?    Identified activities to engage in that promote balance in wellness  ?    Distinguished possible barriers to success over the weekend and created a plan to overcome them  ?    Listed distress coping skills and identified social support network to utilize if in crisis during the weekend      Service Modality:  Video Visit     Telemedicine Visit: The patient's condition can be safely assessed and treated via synchronous audio and visual telemedicine encounter.      Reason for Telemedicine Visit: Services only offered telehealth and due to COVID-19.    Originating Site (Patient Location): Patient's home    Distant Site (Provider Location): Provider Remote Setting- Home Office    Consent:  The patient/guardian has verbally consented to: the potential risks and benefits of telemedicine (video visit) versus in person care; bill my insurance or make self-payment for services provided; and responsibility for  payment of non-covered services.     Patient would like the video invitation sent by:  My Chart    Mode of Communication:  Video Conference via Medical Zoom    As the provider I attest to compliance with applicable laws and regulations related to telemedicine.      Patient Participation / Response:  Fully participated with the group by sharing personal reflections / insights and openly received / provided feedback with other participants.    Demonstrated understanding of topics discussed through group discussion and participation, Identified / Expressed personal readiness to practice skills and Verbalized understanding of health maintenance topic    Treatment Plan:  Patient has a current master individualized treatment plan.  See Epic treatment plan for more information.    Yordy White LMFT

## 2021-08-09 ENCOUNTER — HOSPITAL ENCOUNTER (OUTPATIENT)
Dept: BEHAVIORAL HEALTH | Facility: CLINIC | Age: 56
End: 2021-08-09
Attending: PSYCHIATRY & NEUROLOGY
Payer: COMMERCIAL

## 2021-08-09 ENCOUNTER — MYC MEDICAL ADVICE (OUTPATIENT)
Dept: PALLIATIVE MEDICINE | Facility: CLINIC | Age: 56
End: 2021-08-09

## 2021-08-09 ENCOUNTER — MYC MEDICAL ADVICE (OUTPATIENT)
Dept: FAMILY MEDICINE | Facility: CLINIC | Age: 56
End: 2021-08-09

## 2021-08-09 DIAGNOSIS — M54.59 LUMBAR FACET JOINT PAIN: ICD-10-CM

## 2021-08-09 DIAGNOSIS — M54.41 CHRONIC BILATERAL LOW BACK PAIN WITH RIGHT-SIDED SCIATICA: ICD-10-CM

## 2021-08-09 DIAGNOSIS — M25.551 HIP PAIN, RIGHT: ICD-10-CM

## 2021-08-09 DIAGNOSIS — M51.369 DDD (DEGENERATIVE DISC DISEASE), LUMBAR: ICD-10-CM

## 2021-08-09 DIAGNOSIS — F11.20 UNCOMPLICATED OPIOID DEPENDENCE (H): ICD-10-CM

## 2021-08-09 DIAGNOSIS — M45.7 ANKYLOSING SPONDYLITIS OF LUMBOSACRAL REGION (H): ICD-10-CM

## 2021-08-09 DIAGNOSIS — M05.79 RHEUMATOID ARTHRITIS INVOLVING MULTIPLE SITES WITH POSITIVE RHEUMATOID FACTOR (H): ICD-10-CM

## 2021-08-09 DIAGNOSIS — G89.29 CHRONIC BILATERAL LOW BACK PAIN WITH RIGHT-SIDED SCIATICA: ICD-10-CM

## 2021-08-09 DIAGNOSIS — F17.200 TOBACCO USE DISORDER: ICD-10-CM

## 2021-08-09 PROCEDURE — 90853 GROUP PSYCHOTHERAPY: CPT | Mod: GT

## 2021-08-09 PROCEDURE — 90853 GROUP PSYCHOTHERAPY: CPT | Mod: GT | Performed by: PSYCHOLOGIST

## 2021-08-09 PROCEDURE — 90853 GROUP PSYCHOTHERAPY: CPT | Mod: GT | Performed by: SOCIAL WORKER

## 2021-08-09 RX ORDER — BUPRENORPHINE AND NALOXONE 8; 2 MG/1; MG/1
FILM, SOLUBLE BUCCAL; SUBLINGUAL
Qty: 60 EACH | Refills: 0 | Status: SHIPPED | OUTPATIENT
Start: 2021-08-09 | End: 2021-09-08

## 2021-08-09 NOTE — GROUP NOTE
Process Group Note                                    Service Modality:  Video Visit     Telemedicine Visit: The patient's condition can be safely assessed and treated via synchronous audio and visual telemedicine encounter.      Reason for Telemedicine Visit: Patient has requested telehealth visit, Patient unable to travel, Patient convenience (e.g. access to timely appointments / distance to available provider), Patient lives in a designated Health Professional Shortage Area (HPSA), and Services only offered telehealth    Originating Site (Patient Location): Patient's home    Distant Site (Provider Location): [facility]: [facility], [facility]    Consent:  The patient/guardian has verbally consented to: the potential risks and benefits of telemedicine (video visit) versus in person care; bill my insurance or make self-payment for services provided; and responsibility for payment of non-covered services.     Patient would like the video invitation sent by:  My Chart    Mode of Communication:  Video Conference via Medical Zoom    As the provider I attest to compliance with applicable laws and regulations related to telemedicine.        PATIENT'S NAME: Jailene Breen  MRN:   [id number]  :   1965  ACCT. NUMBER: [id number]  DATE OF SERVICE: 21  START TIME:  9:00 AM  END TIME:  9:50 AM  FACILITATOR: Josefina Beauchamp PsyD  TOPIC:  Process Group    Diagnoses:    296.32 Major Depressive Disorder, recurrent, moderate  300.022 JEFF  300.01  Panic Disorder     Ankylosing Spondylitis   Rheumatoid Arthritis    Fredo Hughes MD MD Orthopedics 2016 End  21   Phone: [phone]; Fax: [phone]      Aiden Resendez PA  Assigned PCP  2020   Namita Sharma MD Resident Student in organized health care education/training program 2020 End  20   Phone: [phone]; Fax: [phone]      Barbie Christie, PhD LP Psychologist Licensed  Mental Health 08/18/2020 End  8/18/20   Phone: [phone]; Fax: [phone]      Comment: supervising      Susana Pike APRN CNP Referring Physician Nurse Practitioner 08/18/2020 End  8/18/20   Phone: [phone]; Fax: [phone]      Raghu Parada MD  Assigned Rheumatology Provider  10/23/2020   10/28/20            Chanelle Solo MD MD Psychiatry 02/24/2021 End  2/24/21   Phone: [phone]; Fax: [phone]      Comment: Attending      Coffin, Richard Breyen, MD Resident Psychiatry 02/24/2021 End  2/24/21   Phone: [phone]; Fax: [phone]            [facility] Mental Health Day Treatment  TRACK: 2A    NUMBER OF PARTICIPANTS: 6          Data:    Session content: At the start of this group, patients were invited to check in by identifying themselves, describing their current emotional status, and identifying issues to address in this group.   Area(s) of treatment focus addressed in this session included Symptom Management, Personal Safety and Community Resources/Discharge Planning.  Client reported being safe today.  Reported mood is sad    Goal for today is to attend therapy groups.  The client talked to the group about how he was depressed over the weekend and felt that it would be better if he didn't wake up. He reported being fatigued and feeling worthlessness and noticed that his depression was acting up. He reported feeling scared and worried about his Social Security hearing in October and whether he would get it or not. He reported worry about his physical health condition and chronic pain. He stated that he felt he was getting messages from social media, Facebook that were secretly sent to him about his condition and his depression levels. When questioned about psychosis, he said it wasn't like that. He reported that his psychiatrist gave him a list of DBT programs to do, as a step-down to this 3 day a week program, but he said he has not looked at the list and has  not called any of the programs.       Therapeutic Interventions/Treatment Strategies:  Psychotherapist reinforced use of skills. Treatment modalities used include Cognitive Behavioral Therapy and Dialectical Behavioral Therapy. Interventions include Cognitive Restructuring:  Facilitated recognition of the connection between negative thoughts and negative core beliefs, Coping Skills: Assisted patient in identifying 1-2 healthy distraction skills to reduce overall distress, Relapse Prevention: Coached on skills to manage factors that contribute to relapse and Mindfulness: Facilitated discussion of when/how to use mindfulness skills to benefit general health, mental health symptoms, and stressors.    Assessment:    Patient response:   Patient responded to session by focusing on goals, being attentive and appearing alert    Possible barriers to participation / learning include: severity of symptoms    Health Issues:   Yes: Pain, Associated Psychological Distress       Substance Use Review:   Substance Use: No active concerns identified.    Mental Status/Behavioral Observations  Appearance:   Appropriate   Eye Contact:   Good   Psychomotor Behavior: Normal   Attitude:   Cooperative   Orientation:   All  Speech   Rate / Production: Normal    Volume:  Normal   Mood:    Anxious  Depressed  Sad   Affect:    Constricted   Thought Content:   Rumination  Thought Form:  Coherent  Logical     Insight:    Good     Plan:     Safety Plan: Recommended that patient call 911 or go to the local ED should there be a change in any of these risk factors.     Barriers to treatment: None identified    Patient Contracts (see media tab):  None, asked to call DBT programs    Substance Use: Provided encouragement towards sobriety     Continue or Discharge: Patient will continue in Adult Day Treatment (ADT)  as planned. Patient is likely to benefit from learning and using skills as they work toward the goals identified in their treatment  plan.      Josefina Beauchamp PsyD  August 9, 2021  Elpidio Blake, TIRSO,  Licensed Clinical Psychologist

## 2021-08-09 NOTE — TELEPHONE ENCOUNTER
Signed Prescriptions:                        Disp   Refills    buprenorphine HCl-naloxone HCl (SUBOXONE) *60 each0        Sig: Use 0.5 film under the tongue 4 times per day for           pain, approximately every 6 hours. Max 2 films           per day. Fill now. Start now. 30 day supply  Authorizing Provider: SUSANA PETTY    Reviewed [place]  August 9, 2021- no concerning fills.    Susana GRANT, RN CNP, FNP  [facility]  [facility]

## 2021-08-09 NOTE — GROUP NOTE
Psychotherapy Group Note    PATIENT'S NAME: Jailene Breen  MRN:   [id number]  :   1965  ACCT. NUMBER: [id number]  DATE OF SERVICE: 21  START TIME: 10:00 AM  END TIME: 10:50 AM  FACILITATOR: Diana Ulrich LMFT  TOPIC: MH EBP Group: Self-Awareness  [facility] Adult Mental Health Day Treatment  TRACK: 2A    NUMBER OF PARTICIPANTS: 6    Summary of Group / Topics Discussed:  Self-Awareness: Grief: Patients were provided with an overview of how personal losses impact their thoughts, feelings, and behaviors. Different stages of grief were discussed, with a focus on the personal and individual experiences of grief as a natural response to loss. The relationship between grief, depression, and anxiety was also discussed. Patients were provided with information regarding different ways of processing grief and shared their personal experiences.     Patient Session Goals / Objectives:    Defined and explored the concept of grief and the grieving process    Discussed relationship between grief, depression, and anxiety     Normalized and recognized the purpose/benefits of the grieving process    Discussed management of the thoughts and feelings associated with grief                                    Service Modality:  Video Visit     Telemedicine Visit: The patient's condition can be safely assessed and treated via synchronous audio and visual telemedicine encounter.      Reason for Telemedicine Visit: Services only offered telehealth    Originating Site (Patient Location): Patient's home    Distant Site (Provider Location): Provider Remote Setting- Home Office    Consent:  The patient/guardian has verbally consented to: the potential risks and benefits of telemedicine (video visit) versus in person care; bill my insurance or make self-payment for services provided; and responsibility for payment of non-covered services.     Patient would like the video invitation sent by:  My Chart    Mode of  Communication:  Video Conference via Medical Zoom    As the provider I attest to compliance with applicable laws and regulations related to telemedicine.            Patient Participation / Response:  Moderately participated, sharing some personal reflections / insights and adequately adequately received / provided feedback with other participants.    Demonstrated understanding of topics discussed through group discussion and participation and Demonstrated understanding of values, strengths, and challenges to learn about themselves    Treatment Plan:  Patient has a current master individualized treatment plan.  See Epic treatment plan for more information.    Diana Ulrich LMFT

## 2021-08-09 NOTE — TELEPHONE ENCOUNTER
Received call from patient requesting refill(s) of buprenorphine HCl-naloxone HCl (SUBOXONE) 8-2 MG per film     Hi, could you please refill my suboxone as I only have one day left.  Been sick last week and didn't remember to call in earlier,  sorry.     Last dispensed from pharmacy on 7/9/21    Patient's last office/virtual visit by prescribing provider on 4/21/21  Next office/virtual appointment scheduled for none    Last urine drug screen date 1/21/20  Current opioid agreement on file (completed within the last year) No Date of opioid agreement: 1/21/20    E-prescribe to [id number]    Will route to nursing [place] for review and preparation of prescription(s).

## 2021-08-09 NOTE — TELEPHONE ENCOUNTER
Medication refill information reviewed.     Last due:  Fill 7/9/21 and begin 7/9/2021.  30 day supply  Due date:  now      Prescriptions prepped for review.     Cha RN-BSN  [facility]

## 2021-08-09 NOTE — GROUP NOTE
Psychotherapy Group Note    PATIENT'S NAME: Jailene Breen  MRN:   [id number]  :   1965  ACCT. NUMBER: [id number]  DATE OF SERVICE: 21  START TIME: 11:00 AM  END TIME: 11:50 AM  FACILITATOR: Liliya Oh LICSW  TOPIC: MH EBP Group: Relationship Skills  [facility] Adult Mental Health Day Treatment  TRACK: 2A    NUMBER OF PARTICIPANTS: 6    Summary of Group / Topics Discussed:  Relationship Skills: Dependencies: Patients were provided with a general overview of different types of dependencies and how they relate to symptoms and functioning. The purpose is to help patients identify the different types of dependencies, how they may be impacted by them, and to gain awareness and skills to work towards healthier relationships.    Patient Session Goals / Objectives:    Familiarized patients with the concept of interpersonal relationships and their characteristics.      Discussed and practiced strategies to promote healthier understanding of interpersonal relationships with a focus on dependencies    Identified types of dependencies in patient s lives and how they impact their symptoms and functioning                                    Service Modality:  Video Visit     Telemedicine Visit: The patient's condition can be safely assessed and treated via synchronous audio and visual telemedicine encounter.      Reason for Telemedicine Visit: Services only offered telehealth    Originating Site (Patient Location): Patient's home    Distant Site (Provider Location): Provider Remote Setting- Home Office    Consent:  The patient/guardian has verbally consented to: the potential risks and benefits of telemedicine (video visit) versus in person care; bill my insurance or make self-payment for services provided; and responsibility for payment of non-covered services.     Patient would like the video invitation sent by:  My Chart    Mode of Communication:  Video Conference via Medical Zoom    As the  provider I attest to compliance with applicable laws and regulations related to telemedicine.           Patient Participation / Response:  Fully participated with the group by sharing personal reflections / insights and openly received / provided feedback with other participants.    Demonstrated understanding of relationship skills and communication skills    Treatment Plan:  Patient has a current master individualized treatment plan.  See Epic treatment plan for more information.    Liliya Oh, LICSW

## 2021-08-09 NOTE — TELEPHONE ENCOUNTER
Routed to the nursing pool and to the MA pool to process refill(s).  _________________________________    Mychart sent to pt:  From: Cha Molina RN      Created: 8/9/2021 9:33 AM      Hi Les,  I have sent this on to be processed.  For future please be sure to contact us sooner.  JUANITA Ramon CNP is out of the office until 8/17/21 and not all of our covering providers are able to prescribe this medication.  We will do out best to get this processed in time for you and will let you know when it is sent on to your pharmacy.     Cha RN-BSN  [facility]

## 2021-08-11 ENCOUNTER — TELEPHONE (OUTPATIENT)
Dept: BEHAVIORAL HEALTH | Facility: CLINIC | Age: 56
End: 2021-08-11

## 2021-08-11 NOTE — TELEPHONE ENCOUNTER
Phone call to Les to coordinate care for treatment.  A message was left for him.    Elpidio Blake, TIRSO,  Licensed Clinical Psychologist

## 2021-08-12 ENCOUNTER — HOSPITAL ENCOUNTER (OUTPATIENT)
Dept: BEHAVIORAL HEALTH | Facility: CLINIC | Age: 56
End: 2021-08-12
Attending: PSYCHIATRY & NEUROLOGY
Payer: COMMERCIAL

## 2021-08-12 PROCEDURE — 90853 GROUP PSYCHOTHERAPY: CPT | Mod: GT,95 | Performed by: SOCIAL WORKER

## 2021-08-12 PROCEDURE — 90853 GROUP PSYCHOTHERAPY: CPT | Mod: GT,95 | Performed by: PSYCHOLOGIST

## 2021-08-12 PROCEDURE — 90853 GROUP PSYCHOTHERAPY: CPT | Mod: GT | Performed by: COUNSELOR

## 2021-08-12 NOTE — GROUP NOTE
Process Group Note                                    Service Modality:  Video Visit     Telemedicine Visit: The patient's condition can be safely assessed and treated via synchronous audio and visual telemedicine encounter.      Reason for Telemedicine Visit: Patient has requested telehealth visit, Patient unable to travel, Patient convenience (e.g. access to timely appointments / distance to available provider), Patient lives in a designated Health Professional Shortage Area (HPSA), and Services only offered telehealth    Originating Site (Patient Location): Patient's home    Distant Site (Provider Location): [facility]: [facility], [facility]    Consent:  The patient/guardian has verbally consented to: the potential risks and benefits of telemedicine (video visit) versus in person care; bill my insurance or make self-payment for services provided; and responsibility for payment of non-covered services.     Patient would like the video invitation sent by:  My Chart    Mode of Communication:  Video Conference via Medical Zoom    As the provider I attest to compliance with applicable laws and regulations related to telemedicine.        PATIENT'S NAME: Jailene Breen  MRN:   [id number]  :   1965  ACCT. NUMBER: [id number]  DATE OF SERVICE: 21  START TIME:  9:00 AM  END TIME:  9:50 AM  FACILITATOR: Josefina Beauchamp PsyD  TOPIC:  Process Group    Diagnoses:    296.32 Major Depressive Disorder, recurrent, moderate  300.022 JEFF  300.01  Panic Disorder     Ankylosing Spondylitis   Rheumatoid Arthritis    Fredo Hughes MD MD Orthopedics 2016 End  21   Phone: [phone]; Fax: [phone]      Aiden Resendez PA  Assigned PCP  2020   Namita Sharma MD Resident Student in organized health care education/training program 2020 End  20   Phone: [phone]; Fax: [phone]      Barbie Christie, PhD LP Psychologist Licensed  Mental Health 08/18/2020 End  8/18/20   Phone: [phone]; Fax: [phone]      Comment: supervising      Susana Pike APRN CNP Referring Physician Nurse Practitioner 08/18/2020 End  8/18/20   Phone: [phone]; Fax: [phone]      Raghu Parada MD  Assigned Rheumatology Provider  10/23/2020   10/28/20            Chanelle Solo MD MD Psychiatry 02/24/2021 End  2/24/21   Phone: [phone]; Fax: [phone]      Comment: Attending      Coffin, Richard Breyen, MD Resident Psychiatry 02/24/2021 End  2/24/21   Phone: [phone]; Fax: [phone]            [facility] Mental Health Day Treatment  TRACK: 2A    NUMBER OF PARTICIPANTS: 8          Data:    Session content: At the start of this group, patients were invited to check in by identifying themselves, describing their current emotional status, and identifying issues to address in this group.   Area(s) of treatment focus addressed in this session included Symptom Management, Personal Safety and Community Resources/Discharge Planning.  Client reported being safe today.  Reported mood is depressed.   Goal for today is to attend therapy groups and rest. The client talked to the group about how he feels more fatigue today and the group encouraged him to talk with his doctor about physical health issues.  He reported that he hasn't communicated with his mom, since he had to set boundaries with her a year ago, and she triggers his bad memories.  He reported feeling down and ill, and has a doctor appointment next week. He reported that he has not called on any DBT programs.      Therapeutic Interventions/Treatment Strategies:  Psychotherapist reinforced use of skills. Treatment modalities used include Cognitive Behavioral Therapy and Dialectical Behavioral Therapy. Interventions include Coping Skills: Reviewed patients current calming practices and discussed a more formal way of practicing and accessing skills, Relapse Prevention:  Assisted patient in identifying personal vulnerabilities, thoughts, emotions, and situations that may lead to relapse , Mindfulness: Encouraged a plan to use mindfulness skills in daily life and Symptoms Management: Promoted understanding of their diagnoses and how it impacts their functioning.    Assessment:    Patient response:   Patient responded to session by listening, being attentive and appearing alert    Possible barriers to participation / learning include: severity of symptoms    Health Issues:   Yes: Pain, Associated Psychological Distress       Substance Use Review:   Substance Use: No active concerns identified.    Mental Status/Behavioral Observations  Appearance:   Appropriate   Eye Contact:   Good   Psychomotor Behavior: Normal   Attitude:   Cooperative   Orientation:   All  Speech   Rate / Production: Normal    Volume:  Normal   Mood:    Anxious  Depressed  Sad   Affect:    Constricted   Thought Content:   Rumination  Thought Form:  Coherent  Logical     Insight:    Good     Plan:     Safety Plan: Recommended that patient call 911 or go to the local ED should there be a change in any of these risk factors.     Barriers to treatment: None identified    Patient Contracts (see media tab):  None    Substance Use: Provided encouragement towards sobriety     Continue or Discharge: Patient will continue in Adult Day Treatment (ADT)  as planned. Patient is likely to benefit from learning and using skills as they work toward the goals identified in their treatment plan.      Josefina Beauchamp PsyD  August 12, 2021  Elpidio Blake, TIRSO,  Licensed Clinical Psychologist

## 2021-08-12 NOTE — ADDENDUM NOTE
Encounter addended by: Josefina Beauchamp PsyD on: 8/12/2021 5:21 PM   Actions taken: Flowsheet accepted

## 2021-08-12 NOTE — GROUP NOTE
Psychoeducation Group Note    PATIENT'S NAME: Jailene Breen  MRN:   [id number]  :   1965  ACCT. NUMBER: [id number]  DATE OF SERVICE: 21  START TIME: 11:00 AM  END TIME: 11:50 AM  FACILITATOR: Liliya Oh LICSW  TOPIC: MH RN Group: Health Maintenance  [facility] Adult Mental Health Day Treatment  TRACK: 2A    NUMBER OF PARTICIPANTS: 6    Summary of Group / Topics Discussed:  Health Maintenance: Discharge planning/Community resources: Patients worked on completing an instructor-facilitated discharge planning activity. Discharge planning begins for all patients after admission. Competent discharge planning promotes a successful transition and decreases the likelihood of mental health relapse. In this group, all dimensions of wellness were reviewed to assess for needs/discharge readiness. These dimensions included: physical, emotional, occupational/productivity, environmental, social, spiritual, intellectual, and financial. Patients worked on completing/updating their discharge planning and identifying their treatment needs prior to time of discharge.     Patient Session Goals / Objectives:  ? Identified unmet treatment needs to accomplish before discharge  ? Completed all dimensions of the discharge planning packet  ? Participated in the planning process, make phone calls, set up appointments, got connected with community resources, followed up with treatment team as needed                                       Service Modality:  Video Visit     Telemedicine Visit: The patient's condition can be safely assessed and treated via synchronous audio and visual telemedicine encounter.      Reason for Telemedicine Visit: Services only offered telehealth    Originating Site (Patient Location): Patient's home    Distant Site (Provider Location): Provider Remote Setting- Home Office    Consent:  The patient/guardian has verbally consented to: the potential risks and benefits of telemedicine  (video visit) versus in person care; bill my insurance or make self-payment for services provided; and responsibility for payment of non-covered services.     Patient would like the video invitation sent by:  My Chart    Mode of Communication:  Video Conference via Medical Zoom    As the provider I attest to compliance with applicable laws and regulations related to telemedicine.           Patient Participation / Response:  Fully participated with the group by sharing personal reflections / insights and openly received / provided feedback with other participants.    Identified / Expressed personal readiness to practice skills    Treatment Plan:  Patient has a current master individualized treatment plan.  See Epic treatment plan for more information.    Liliya Oh, WALLYSW

## 2021-08-12 NOTE — GROUP NOTE
Psychotherapy Group Note    PATIENT'S NAME: Jailene Breen  MRN:   [id number]  :   1965  [id number]  DATE OF SERVICE: 21  START TIME: 10:00 AM  END TIME: 10:50 AM  FACILITATOR: Vale Pepe LPCC  TOPIC: MH EBP Group: Cognitive Restructuring  [facility] Adult Mental Health Day Treatment  TRACK: 2A    NUMBER OF PARTICIPANTS: 7    Summary of Group / Topics Discussed:  Cognitive Restructuring: Introduction and Overview: Patients were introduced to Cognitive Behavioral Therapy (CBT) as a way to identify ineffective thought patterns, understand factors that contribute to ineffective thought patterns, gain awareness of the impact of those thoughts on emotions and behavior, and learn methods for modifying thinking to achieve better mood regulation. Patients received a general overview of how ones thoughts influence feelings and behaviors in the context of CBT. Patients explored the development of their own thought patterns and how they impact daily functioning.      Patient Session Goals / Objectives:    Familiarize self with the interrelationship of thoughts, feelings and behavior.    Identify ineffective / unhelpful thought patterns and their impact on mood.                                      Service Modality:  Video Visit     Telemedicine Visit: The patient's condition can be safely assessed and treated via synchronous audio and visual telemedicine encounter.      Reason for Telemedicine Visit: Services only offered telehealth    Originating Site (Patient Location): Patient's home    Distant Site (Provider Location): Provider Remote Setting- Home Office    Consent:  The patient/guardian has verbally consented to: the potential risks and benefits of telemedicine (video visit) versus in person care; bill my insurance or make self-payment for services provided; and responsibility for payment of non-covered services.     Patient would like the video invitation sent by:  My  Chart    Mode of Communication:  Video Conference via Medical Zoom    As the provider I attest to compliance with applicable laws and regulations related to telemedicine.                 Patient Participation / Response:  Minimally participated, only when prompted / asked.    Demonstrated understanding of topics discussed through group discussion and participation    Treatment Plan:  Patient has a current master individualized treatment plan.  See Epic treatment plan for more information.    Vale Pepe, LIZZETTEC

## 2021-08-16 ENCOUNTER — HOSPITAL ENCOUNTER (OUTPATIENT)
Dept: BEHAVIORAL HEALTH | Facility: CLINIC | Age: 56
End: 2021-08-16
Attending: PSYCHIATRY & NEUROLOGY
Payer: COMMERCIAL

## 2021-08-16 PROCEDURE — 90853 GROUP PSYCHOTHERAPY: CPT | Mod: GT,95 | Performed by: PSYCHOLOGIST

## 2021-08-16 NOTE — GROUP NOTE
Psychotherapy Group Note                                      Service Modality:  Video Visit     Telemedicine Visit: The patient's condition can be safely assessed and treated via synchronous audio and visual telemedicine encounter.      Reason for Telemedicine Visit: Patient has requested telehealth visit, Patient unable to travel, Patient convenience (e.g. access to timely appointments / distance to available provider), Patient lives in a designated Health Professional Shortage Area (HPSA), and Services only offered telehealth    Originating Site (Patient Location): Patient's home    Distant Site (Provider Location): [facility]: [facility], [facility]    Consent:  The patient/guardian has verbally consented to: the potential risks and benefits of telemedicine (video visit) versus in person care; bill my insurance or make self-payment for services provided; and responsibility for payment of non-covered services.     Patient would like the video invitation sent by:  My Chart    Mode of Communication:  Video Conference via Medical Zoom    As the provider I attest to compliance with applicable laws and regulations related to telemedicine.        PATIENT'S NAME: Jailene Breen  MRN:   [id number]  :   1965  ACCT. NUMBER: [id number]  DATE OF SERVICE: 21  START TIME: 10:00 AM  END TIME: 10:50 AM  FACILITATOR: Josefina Beauchamp PsyD  TOPIC:  EBP Group: Self-Awareness  [facility] Adult Mental Health Day Treatment  TRACK: 2A    NUMBER OF PARTICIPANTS: 7    Summary of Group / Topics Discussed:  Self-Awareness: Personal Strengths: Topic focused on assisting patients in identifying personal strengths and how they relate to the management of mental health symptoms. Patients discussed the benefits of acknowledging their personal strengths and their impact on mood improvement, mindfulness, and perspective. Patients worked to increase time spent on recognition and appreciation of what is positive  and working in their lives. The goal is to reduce rumination and negative thinking resulting in increased mindfulness and resilience. Patients will work to put skills into practice and problem-solve barriers.     Patient Session Goals / Objectives:    Identified personal strengths    Identified barriers to recognition of personal strengths    Verbalized understanding of strategies to increase use of their strengths in management of daily symptoms      Patient Participation / Response:  Fully participated with the group by sharing personal reflections / insights and openly received / provided feedback with other participants.    Identified / Expressed readiness to act intentionally, increase self-compassion, promote personal growth    Treatment Plan:  Patient has a current master individualized treatment plan.  See Epic treatment plan for more information.    Alisia Woodward Psy., D,  Licensed Clinical Psychologist

## 2021-08-16 NOTE — GROUP NOTE
Psychotherapy Group Note    PATIENT'S NAME: Jailene Breen  MRN:   [id number]  :   1965  ACCT. NUMBER: [id number]  DATE OF SERVICE: 21  START TIME: 11:00 AM  END TIME: 11:30 AM  FACILITATOR: Kiara Daugherty LICSW  TOPIC: MH EBP Group: Coping Skills  [facility] Adult Mental Health Day Treatment  TRACK: 2A                              Service Modality:  Video Visit     Telemedicine Visit: The patient's condition can be safely assessed and treated via synchronous audio and visual telemedicine encounter.      Reason for Telemedicine Visit: Services only offered telehealth    Originating Site (Patient Location): Patient's home    Distant Site (Provider Location): [facility]    Consent:  The patient/guardian has verbally consented to: the potential risks and benefits of telemedicine (video visit) versus in person care; bill my insurance or make self-payment for services provided; and responsibility for payment of non-covered services.     Patient would like the video invitation sent by:  My Chart    Mode of Communication:  Video Conference via Medical Zoom    As the provider I attest to compliance with applicable laws and regulations related to telemedicine.         NUMBER OF PARTICIPANTS: 6    Summary of Group / Topics Discussed:  Coping Skills: Building Positive Experiences: Patients discussed the importance of planning and engaging in positive experiences, as strategies to increase positive thinking, hope, and self-worth.  Explored the benefits of planning / creating positive experiences, including recognizing and reducing negativity bias by focusing on and building positive experiences.   Several approaches to building positive experiences were presented and discussed relevant to each patient.      Patient Session Goals / Objectives:    Understand the purpose of planning / creating / participating / sharing in positive experiences.    Explore patient s  experiences related to negative thinking and how it influences activities and moodIdentify current positive events in patient s life.     Set goals to increase a variety of positive experiences.    Address barriers to planning / engaging in positive experiences.        Patient Participation / Response:  Minimally participated, only when prompted / asked.    Demonstrated understanding of topics discussed through group discussion and participation    Treatment Plan:  Patient has a current master individualized treatment plan.  See Epic treatment plan for more information.    WALLY BondSW

## 2021-08-16 NOTE — GROUP NOTE
Process Group Note                                    Service Modality:  Video Visit     Telemedicine Visit: The patient's condition can be safely assessed and treated via synchronous audio and visual telemedicine encounter.      Reason for Telemedicine Visit: Patient has requested telehealth visit, Patient unable to travel, Patient convenience (e.g. access to timely appointments / distance to available provider), Patient lives in a designated Health Professional Shortage Area (HPSA), and Services only offered telehealth    Originating Site (Patient Location): Patient's home    Distant Site (Provider Location): [facility]: [facility], [facility]    Consent:  The patient/guardian has verbally consented to: the potential risks and benefits of telemedicine (video visit) versus in person care; bill my insurance or make self-payment for services provided; and responsibility for payment of non-covered services.     Patient would like the video invitation sent by:  My Chart    Mode of Communication:  Video Conference via Medical Zoom    As the provider I attest to compliance with applicable laws and regulations related to telemedicine.        PATIENT'S NAME: Jailene Breen  MRN:   [id number]  :   1965  ACCT. NUMBER: [id number]  DATE OF SERVICE: 21  START TIME:  9:00 AM  END TIME:  9:50 AM  FACILITATOR: Josefina Beauchamp PsyD  TOPIC:  Process Group    Diagnoses:    296.32 Major Depressive Disorder, recurrent, moderate  300.022 JEFF  300.01  Panic Disorder     Ankylosing Spondylitis   Rheumatoid Arthritis    Fredo Hughes MD MD Orthopedics 2016 End  21   Phone: [phone]; Fax: [phone]      Aiden Resendez PA  Assigned PCP  2020   Namita Sharma MD Resident Student in organized health care education/training program 2020 End  20   Phone: [phone]; Fax: [phone]      Barbie Christie, PhD LP Psychologist Licensed  Mental Health 08/18/2020 End  8/18/20   Phone: [phone]; Fax: [phone]      Comment: supervising      Susana Pike APRN CNP Referring Physician Nurse Practitioner 08/18/2020 End  8/18/20   Phone: [phone]; Fax: [phone]      Raghu Parada MD  Assigned Rheumatology Provider  10/23/2020   10/28/20            Chanelle Solo MD MD Psychiatry 02/24/2021 End  2/24/21   Phone: [phone]; Fax: [phone]      Comment: Attending      Coffin, Richard Breyen, MD Resident Psychiatry 02/24/2021 End  2/24/21   Phone: [phone]; Fax: [phone]            [facility] Adult Mental Health Day Treatment  TRACK: 2A    NUMBER OF PARTICIPANTS: 7          Data:    Session content: At the start of this group, patients were invited to check in by identifying themselves, describing their current emotional status, and identifying issues to address in this group.   Area(s) of treatment focus addressed in this session included Community Resources/Discharge Planning.  Client reported being safe today.  Reported mood is depressed.     Goal for today is to attend therapy groups.The client talked to the group about how he didn't get off the couch this past weekend and was depressed. He reported that he will start online classes next week. He talked about calling DBT programs, but did not call any. He reported that he wouldn't return to the [facility] clinic, due to problems in the past. He reported that he was interested in the clinic for next week. He reported that he will continue to see his doctor and his therapist.      Therapeutic Interventions/Treatment Strategies:  Psychotherapist reinforced use of skills. Treatment modalities used include Cognitive Behavioral Therapy and Dialectical Behavioral Therapy. Interventions include Relapse Prevention: Facilitated understanding of effective coping skills in response to triggers for substance use, Mindfulness: Facilitated discussion of when/how to use  mindfulness skills to benefit general health, mental health symptoms, and stressors and Symptoms Management: Promoted understanding of their diagnoses and how it impacts their functioning.    Assessment:    Patient response:   Patient responded to session by listening, being attentive and appearing alert    Possible barriers to participation / learning include: severity of symptoms    Health Issues:   Yes: Pain, Associated Psychological Distress       Substance Use Review:   Substance Use: No active concerns identified.    Mental Status/Behavioral Observations  Appearance:   Appropriate   Eye Contact:   Good   Psychomotor Behavior: Normal   Attitude:   Cooperative   Orientation:   All  Speech   Rate / Production: Normal    Volume:  Normal   Mood:    Anxious  Depressed   Affect:    Constricted   Thought Content:   Rumination  Thought Form:  Coherent  Logical     Insight:    Good     Plan:     Safety Plan: Recommended that patient call 911 or go to the local ED should there be a change in any of these risk factors.     Barriers to treatment: None identified    Patient Contracts (see media tab):  None    Substance Use: Provided encouragement towards sobriety     Continue or Discharge: Patient will continue in Adult Day Treatment (ADT)  as planned. Patient is likely to benefit from learning and using skills as they work toward the goals identified in their treatment plan.      Josefina Beauchamp PsyD  August 16, 2021  Elpidio Blake, D,  Licensed Clinical Psychologist

## 2021-08-19 ENCOUNTER — MYC MEDICAL ADVICE (OUTPATIENT)
Dept: SURGERY | Facility: CLINIC | Age: 56
End: 2021-08-19

## 2021-08-19 ENCOUNTER — HOSPITAL ENCOUNTER (OUTPATIENT)
Dept: BEHAVIORAL HEALTH | Facility: CLINIC | Age: 56
End: 2021-08-19
Attending: PSYCHIATRY & NEUROLOGY
Payer: COMMERCIAL

## 2021-08-19 PROCEDURE — 90853 GROUP PSYCHOTHERAPY: CPT | Mod: GT | Performed by: PSYCHOLOGIST

## 2021-08-19 PROCEDURE — 90853 GROUP PSYCHOTHERAPY: CPT | Mod: GT

## 2021-08-19 NOTE — GROUP NOTE
Process Group Note                                    Service Modality:  Video Visit     Telemedicine Visit: The patient's condition can be safely assessed and treated via synchronous audio and visual telemedicine encounter.      Reason for Telemedicine Visit: Patient has requested telehealth visit, Patient unable to travel, Patient convenience (e.g. access to timely appointments / distance to available provider), Patient lives in a designated Health Professional Shortage Area (HPSA), and Services only offered telehealth    Originating Site (Patient Location): Patient's home    Distant Site (Provider Location): [facility]: [facility], [facility]    Consent:  The patient/guardian has verbally consented to: the potential risks and benefits of telemedicine (video visit) versus in person care; bill my insurance or make self-payment for services provided; and responsibility for payment of non-covered services.     Patient would like the video invitation sent by:  My Chart    Mode of Communication:  Video Conference via Medical Zoom    As the provider I attest to compliance with applicable laws and regulations related to telemedicine.        PATIENT'S NAME: Jailene Breen  MRN:   [id number]  :   1965  ACCT. NUMBER: [id number]  DATE OF SERVICE: 21  START TIME: 10:00 AM  END TIME: 10:50 AM  FACILITATOR: Josefina Beauchamp PsyD  TOPIC:  Process Group    Diagnoses:    296.32 Major Depressive Disorder, recurrent, moderate  300.022 JEFF  300.01  Panic Disorder     Ankylosing Spondylitis   Rheumatoid Arthritis    Fredo Hughes MD MD Orthopedics 2016 End  21   Phone: [phone]; Fax: [phone]      Aiden Resendez PA  Assigned PCP  2020   Namita Sharma MD Resident Student in organized health care education/training program 2020 End  20   Phone: [phone]; Fax: [phone]      Barbie Christie, PhD LP Psychologist Licensed  Mental Health 08/18/2020 End  8/18/20   Phone: [phone]; Fax: [phone]      Comment: supervising      Susana Pike APRN CNP Referring Physician Nurse Practitioner 08/18/2020 End  8/18/20   Phone: [phone]; Fax: [phone]      Raghu Parada MD  Assigned Rheumatology Provider  10/23/2020   10/28/20            Chanelle Solo MD MD Psychiatry 02/24/2021 End  2/24/21   Phone: [phone]; Fax: [phone]      Comment: Attending      Coffin, Richard Breyen, MD Resident Psychiatry 02/24/2021 End  2/24/21   Phone: [phone]; Fax: [phone]            [facility] Mental Health Day Treatment  TRACK: 2A    NUMBER OF PARTICIPANTS: 6          Data:    Session content: At the start of this group, patients were invited to check in by identifying themselves, describing their current emotional status, and identifying issues to address in this group.   Area(s) of treatment focus addressed in this session included Symptom Management, Personal Safety and Community Resources/Discharge Planning.  Client reported being safe today.  Reported mood is depressed.     Goal for today is to attend therapy groups.  The client talked to the group about how he contacts his son and is relieved that his son is safely in an apartment in [place]. He reported that he will start college classes next week and will attend the clinic on Thursday. He stated that he will consider DBT, and wait, since it demands more days to be open for the therapy.       Therapeutic Interventions/Treatment Strategies:  Psychotherapist reinforced use of skills. Treatment modalities used include Cognitive Behavioral Therapy and Dialectical Behavioral Therapy. Interventions include Coping Skills: Promoted understanding of how and when to apply grounding strategies to reduce distress and increase presence in the moment, Mindfulness: Facilitated discussion of when/how to use mindfulness skills to benefit general health, mental health  symptoms, and stressors and Symptoms Management: Promoted understanding of their diagnoses and how it impacts their functioning.    Assessment:    Patient response:   Patient responded to session by listening, focusing on goals and appearing alert    Possible barriers to participation / learning include: severity of symptoms    Health Issues:   Yes: Pain, Associated Psychological Distress       Substance Use Review:   Substance Use: No active concerns identified.    Mental Status/Behavioral Observations  Appearance:   Appropriate   Eye Contact:   Good   Psychomotor Behavior: Normal   Attitude:   Cooperative   Orientation:   All  Speech   Rate / Production: Normal    Volume:  Normal   Mood:    Depressed  Sad   Affect:    Constricted   Thought Content:   Rumination  Thought Form:  Coherent  Logical     Insight:    Good     Plan:     Safety Plan: Recommended that patient call 911 or go to the local ED should there be a change in any of these risk factors.     Barriers to treatment: None identified    Patient Contracts (see media tab):  None    Substance Use: Provided encouragement towards sobriety     Continue or Discharge: Patient will continue in Adult Day Treatment (ADT)  as planned. Patient is likely to benefit from learning and using skills as they work toward the goals identified in their treatment plan.      Josefina Beauchamp PsyD  August 19, 2021  Elpidio Blake, TIRSO,  Licensed Clinical Psychologist

## 2021-08-19 NOTE — GROUP NOTE
Psychotherapy Group Note    PATIENT'S NAME: Jailene Breen  MRN:   [id number]  :   1965  ACCT. NUMBER: [id number]  DATE OF SERVICE: 21  START TIME:  9:00 AM  END TIME:  9:50 AM  FACILITATOR: Diana Ulrich LMFT  TOPIC: MH EBP Group: Self-Awareness  [facility] Adult Mental Health Day Treatment  TRACK: 2A    NUMBER OF PARTICIPANTS: 7    Summary of Group / Topics Discussed:  Self-Awareness: Self-Compassion: Patients received overview of key concepts in developing self-compassion. Patients discussed mindfulness, self-kindness, and finding common humanity. Patients identified their current approach to problems in their lives and learned skills for increasing self-compassion. Patients identified ways they can put self-compassion skills into practice and problem solve barriers to application of skills.     Patient Session Goals / Objectives:    [place] components of self-compassion    Identify ways to practice self-compassion in daily life    Problem solve barriers to self-compassion practice                                      Service Modality:  Video Visit     Telemedicine Visit: The patient's condition can be safely assessed and treated via synchronous audio and visual telemedicine encounter.      Reason for Telemedicine Visit: Services only offered telehealth    Originating Site (Patient Location): Patient's home    Distant Site (Provider Location): Provider Remote Setting- Home Office    Consent:  The patient/guardian has verbally consented to: the potential risks and benefits of telemedicine (video visit) versus in person care; bill my insurance or make self-payment for services provided; and responsibility for payment of non-covered services.     Patient would like the video invitation sent by:  My Chart    Mode of Communication:  Video Conference via Medical Zoom    As the provider I attest to compliance with applicable laws and regulations related to telemedicine.            Patient  Participation / Response:  Moderately participated, sharing some personal reflections / insights and adequately adequately received / provided feedback with other participants.    Demonstrated understanding of topics discussed through group discussion and participation, Demonstrated understanding of values, strengths, and challenges to learn about themselves, Identified plan to address barriers to practicing skills that promote self-awareness  and Practiced skills in session    Treatment Plan:  Patient has See Epic Treatment Plan - Patient is discharging.    Diana Ulrich LMFT

## 2021-08-23 NOTE — TELEPHONE ENCOUNTER
Looks like the pH results are in and viewable so lets have him follow up with me so we can go over things and get scheduled

## 2021-08-23 NOTE — PROGRESS NOTES
Patient Active Problem List   Diagnosis     CARDIOVASCULAR SCREENING; LDL GOAL LESS THAN 160     Anxiety     Overweight (BMI 25.0-29.9)     Back pain     Tear meniscus knee, right, initial encounter     Rheumatoid arthritis involving multiple sites, unspecified rheumatoid factor presence     Chronic pain     Right-sided thoracic back pain, unspecified chronicity     JEFF (generalized anxiety disorder)     Panic attack     Syncope, unspecified syncope type     Ankylosing spondylitis (H)     Tobacco use disorder     Moderate major depression (H)     Immunocompromised (H)     Essential hypertension     Suicidal ideation     Insomnia due to other mental disorder     Suicide ideation     Depression, unspecified depression type     Major depressive disorder, recurrent episode, severe with mixed features (H)       Current Outpatient Medications:      acetaminophen (TYLENOL) 500 MG tablet, Take 1,000 mg by mouth every 6 hours as needed for mild pain (headache), Disp: , Rfl:      atenolol (TENORMIN) 25 MG tablet, Take 1 tablet (25 mg) by mouth daily, Disp: 90 tablet, Rfl: 1     buprenorphine HCl-naloxone HCl (SUBOXONE) 8-2 MG per film, Use 0.5 film under the tongue 4 times per day for pain, approximately every 6 hours. Max 2 films per day. Fill now. Start now. 30 day supply, Disp: 60 each, Rfl: 0     chlorzoxazone (PARAFON FORTE) 500 MG tablet, Take 1 tablet (500 mg) by mouth 3 times daily as needed for muscle spasms, Disp: 45 tablet, Rfl: 1     clonazePAM (KLONOPIN) 1 MG tablet, TAKE ONE-HALF - 1 TABLET BY MOUTH ONCE DAILY AS NEEDED FOR ANXIETY, Disp: 5 tablet, Rfl: 0     etanercept (ENBREL SURECLICK) 50 MG/ML autoinjector, Inject 50 mg Subcutaneous once a week, Disp: 1 mL, Rfl: 3     fish oil-omega-3 fatty acids 1000 MG capsule, Take 1 g by mouth daily, Disp: , Rfl:      FLUoxetine (PROZAC) 40 MG capsule, Take 1 capsule (40 mg) by mouth daily, Disp: 30 capsule, Rfl: 1     folic acid (FOLVITE) 1 MG tablet, Take 5 tablets (5  mg) by mouth daily, Disp: 150 tablet, Rfl: 3     liothyronine (CYTOMEL) 25 MCG tablet, Take 1 tablet (25 mcg) by mouth daily, Disp: 30 tablet, Rfl: 1     medical cannabis (Patient's own supply), See Admin Instructions (The purpose of this order is to document that the patient reports taking medical cannabis.  This is not a prescription, and is not used to certify that the patient has a qualifying medical condition.)   Pills PRN  Lozenges PRN, Disp: , Rfl:      methotrexate sodium 2.5 MG TABS, TAKE 4 TABLETS BY MOUTH EVERY 7 DAYS, Disp: 48 tablet, Rfl: 0     multivitamin (CENTRUM SILVER) tablet, Take 1 tablet by mouth daily, Disp: , Rfl:      nicotine polacrilex (NICORETTE) 4 MG gum, PLACE 1 PIECE INSIDE CHEEK AS NEEDED FOR SMOKING CESSATION, Disp: 240 each, Rfl: 1     omeprazole (PRILOSEC) 20 MG DR capsule, Take 20 mg by mouth daily as needed , Disp: , Rfl:      prazosin (MINIPRESS) 1 MG capsule, Take 1 capsule (1 mg) by mouth At Bedtime, Disp: 30 capsule, Rfl: 1     QUEtiapine (SEROQUEL) 100 MG tablet, Take 1.5 tablets (150 mg) by mouth At Bedtime, Disp: 45 tablet, Rfl: 1     QUEtiapine (SEROQUEL) 25 MG tablet, Take 1-2 tablets (25-50 mg) by mouth daily as needed (for severe anxiety/panic attacks or sleep), Disp: 30 tablet, Rfl: 0     vitamin D2 (ERGOCALCIFEROL) 56276 units (1250 mcg) capsule, Take 50,000 Units by mouth twice a week mon and thurs, Disp: , Rfl:   Psychiatry staffing: case discussed  Diagnosis:  As above;  Transitioned to aftercare clinic.

## 2021-08-26 ENCOUNTER — HOSPITAL ENCOUNTER (OUTPATIENT)
Dept: BEHAVIORAL HEALTH | Facility: CLINIC | Age: 56
End: 2021-08-26
Attending: PSYCHIATRY & NEUROLOGY
Payer: COMMERCIAL

## 2021-08-26 PROCEDURE — 90853 GROUP PSYCHOTHERAPY: CPT | Mod: 95 | Performed by: MARRIAGE & FAMILY THERAPIST

## 2021-08-26 NOTE — GROUP NOTE
Process Group Note    PATIENT'S NAME: Jailene Breen  MRN:   [id number]  :   1965  ACCT. NUMBER: [id number]  DATE OF SERVICE: 21  START TIME:  9:00 AM  END TIME: 11:00 AM  FACILITATOR: Devin Sahu LMFT  TOPIC:  Process Group    Diagnoses:  296.32 Major Depressive Disorder, recurrent, moderate  300.022 JEFF  300.01  Panic Disorder       [facility] Day Treatment  TRACK: clinic two    NUMBER OF PARTICIPANTS: 5                                      Service Modality:  Video Visit     Telemedicine Visit: The patient's condition can be safely assessed and treated via synchronous audio and visual telemedicine encounter.      Reason for Telemedicine Visit: Services only offered telehealth    Originating Site (Patient Location): Patient's home    Distant Site (Provider Location): Provider Remote Setting- Home Office    Consent:  The patient/guardian has verbally consented to: the potential risks and benefits of telemedicine (video visit) versus in person care; bill my insurance or make self-payment for services provided; and responsibility for payment of non-covered services.     Patient would like the video invitation sent by:  My Chart    Mode of Communication:  Video Conference via Medical Zoom    As the provider I attest to compliance with applicable laws and regulations related to telemedicine.               Data:    Session content: At the start of this group, patients were invited to check in by identifying themselves, describing their current emotional status, and identifying issues to address in this group.   Area(s) of treatment focus addressed in this session included Symptom Management and Personal Safety.    jasson reports feeling ok, he has been feeling down in the dumps this week, his class starts tomorrow and is struggling with low motivation, he has errands to run to get ready for class, is using meditation to center himself, is struggling with procrastination,  denies safety concerns, denies chemical use, is taking rx as prescribed, is grateful for his family.    Therapeutic Interventions/Treatment Strategies:  Psychotherapist offered support, feedback and validation and reinforced use of skills. Treatment modalities used include Motivational Interviewing, Cognitive Behavioral Therapy and Dialectical Behavioral Therapy.    Assessment:    Patient response:   Patient responded to session by listening, being attentive and appearing alert    Possible barriers to participation / learning include: and no barriers identified    Health Issues:   None reported       Substance Use Review:   Substance Use: No active concerns identified.    Mental Status/Behavioral Observations  Appearance:   Appropriate   Eye Contact:   Good   Psychomotor Behavior: Normal   Attitude:   Cooperative   Orientation:   All  Speech   Rate / Production: Normal    Volume:  Normal   Mood:    Anxious   Affect:    Appropriate   Thought Content:   Clear and Safety denies any current safety concerns including suicidal ideation, self-harm, and homicidal ideation  Thought Form:  Coherent  Logical     Insight:    Good     Plan:     Safety Plan: No current safety concerns identified.  Recommended that patient call 911 or go to the local ED should there be a change in any of these risk factors.     Barriers to treatment: None identified    Patient Contracts (see media tab):  None    Substance Use: Not addressed in session     Continue or Discharge: Patient will continue in Adult Day Treatment (ADT)  as planned. Patient is likely to benefit from learning and using skills as they work toward the goals identified in their treatment plan.      Karlos Sahu, LMFT  August 26, 2021

## 2021-08-30 ENCOUNTER — MYC MEDICAL ADVICE (OUTPATIENT)
Dept: PALLIATIVE MEDICINE | Facility: CLINIC | Age: 56
End: 2021-08-30

## 2021-08-30 DIAGNOSIS — M51.369 DDD (DEGENERATIVE DISC DISEASE), LUMBAR: ICD-10-CM

## 2021-08-30 DIAGNOSIS — M45.7 ANKYLOSING SPONDYLITIS OF LUMBOSACRAL REGION (H): ICD-10-CM

## 2021-08-30 DIAGNOSIS — M25.551 HIP PAIN, RIGHT: Primary | ICD-10-CM

## 2021-08-30 DIAGNOSIS — M06.9 RHEUMATOID ARTHRITIS INVOLVING MULTIPLE JOINTS (H): ICD-10-CM

## 2021-08-30 NOTE — TELEPHONE ENCOUNTER
Per patient Daryahart message:  From: Jamison Breen      Created: 8/30/2021 2:07 PM      Hello, could you please send me a prescription for a walking cane to my chart. I could really use one getting out of a chair and going up and down stairs, it'd been getting harder lately.        Routed to provider.     SANDRA Eubanks-BSN  [facility]

## 2021-09-02 ENCOUNTER — HOSPITAL ENCOUNTER (OUTPATIENT)
Dept: BEHAVIORAL HEALTH | Facility: CLINIC | Age: 56
End: 2021-09-02
Attending: PSYCHIATRY & NEUROLOGY
Payer: COMMERCIAL

## 2021-09-02 PROCEDURE — 90853 GROUP PSYCHOTHERAPY: CPT | Mod: 95

## 2021-09-02 NOTE — PROGRESS NOTES
Acknowledgement of Current Treatment Plan         I have reviewed my treatment plan with my therapist / counselor on 9/2/2021   I agree with the plan as it is written in the electronic health record. (clinic 2)     Name:                                    Signature:  Jamison Breen  Unable to Sign due to Covid 19      Dr Saul Quesada MD  Psychiatrist     Dr. Yasemin Bauer, [facility],      Venecia Quesada, [facility]  Psychotherapist  Electronically signed by Nathan Quesada

## 2021-09-02 NOTE — TELEPHONE ENCOUNTER
Completed order signed and placed in bin at [facility]. Please mail order to patient and let him know when it is mailed.   Thanks.  Susana GRANT, SANDRA CNP, FNP  [facility]  [facility]

## 2021-09-02 NOTE — GROUP NOTE
Process Group Note    PATIENT'S NAME: Jailene Breen  MRN:   [id number]  :   1965  ACCT. NUMBER: [id number]  DATE OF SERVICE: 21  START TIME:  9:00 AM  END TIME: 10:50 AM  FACILITATOR: Venecia Quesada  TOPIC:  Process Group    Diagnoses:  296.32 Major Depressive Disorder, recurrent, moderate  300.022 JEFF  300.01  Panic Disorder       [facility] Day [facility]  TRACK: Clinic 2                                      Service Modality:  Video Visit     Telemedicine Visit: The patient's condition can be safely assessed and treated via synchronous audio and visual telemedicine encounter.      Reason for Telemedicine Visit:  covid 19    Originating Site (Patient Location): Patient's home    Distant Site (Provider Location): Provider Remote Setting- Home Office    Consent:  The patient/guardian has verbally consented to: the potential risks and benefits of telemedicine (video visit) versus in person care; bill my insurance or make self-payment for services provided; and responsibility for payment of non-covered services.     Patient would like the video invitation sent by:  My Chart    Mode of Communication:  Video Conference via Medical Zoom    As the provider I attest to compliance with applicable laws and regulations related to telemedicine.         NUMBER OF PARTICIPANTS: 6          Data:    Session content: At the start of this group, patients were invited to check in by identifying themselves, describing their current emotional status, and identifying issues to address in this group.   Area(s) of treatment focus addressed in this session included Symptom Management, Personal Safety, Develop / Improve Independent Living Skills and Develop Socialization / Interpersonal Relationship Skills.  Les reported low energy and motivation.  He feels empty and depressed.  He reported very little sleep over the last few days.  He is struggling to get school work done and feels guilt as he  used to be bread wnner and now feels he is not contributing.  He reported stress related to ongoing attempts to get disability and son moving out. He feels lonely and lone.  He reports parasuicidal ideation with no plan or intent to act.  He is able to commit to safety.     Therapeutic Interventions/Treatment Strategies:  Psychotherapist offered support, feedback and validation and reinforced use of skills. Treatment modalities used include Motivational Interviewing, Cognitive Behavioral Therapy and Dialectical Behavioral Therapy. Validated and normalized.  Highlighted and reinforced skills used; connected to positive outcomes.  Provided additional skill suggestions.  Aided in creating a plan to help work towards goals.        Assessment:    Patient response:   Patient responded to session by accepting feedback, giving feedback, listening, focusing on goals, being attentive and accepting support    Possible barriers to participation / learning include: and no barriers identified    Health Issues:   Yes: Pain, Associated Psychological Distress  Sleep disturbance, Associated Psychological Distress       Substance Use Review:   Substance Use: No active concerns identified.    Mental Status/Behavioral Observations  Appearance:   Appropriate   Eye Contact:   Good   Psychomotor Behavior: Normal   Attitude:   Cooperative   Orientation:   All  Speech   Rate / Production: Normal    Volume:  Normal   Mood:    Normal  Affect:    Appropriate   Thought Content:   Rumination and Safety reports  presence of suicidal ideation passive suicidal ideation   Thought Form:  Coherent  Logical     Insight:    Good     Plan:     Safety Plan: Committed to safety and agreed to follow previously developed safety coping plan.      Barriers to treatment: None identified    Patient Contracts (see media tab):  None    Substance Use: Not addressed in session     Continue or Discharge: Patient will continue in Adult Day Treatment (ADT)  as planned.  Patient is likely to benefit from learning and using skills as they work toward the goals identified in their treatment plan.      Venecia Quesada  September 2, 2021

## 2021-09-07 ENCOUNTER — MYC MEDICAL ADVICE (OUTPATIENT)
Dept: PALLIATIVE MEDICINE | Facility: CLINIC | Age: 56
End: 2021-09-07

## 2021-09-07 DIAGNOSIS — M54.41 CHRONIC BILATERAL LOW BACK PAIN WITH RIGHT-SIDED SCIATICA: ICD-10-CM

## 2021-09-07 DIAGNOSIS — M05.79 RHEUMATOID ARTHRITIS INVOLVING MULTIPLE SITES WITH POSITIVE RHEUMATOID FACTOR (H): ICD-10-CM

## 2021-09-07 DIAGNOSIS — M51.369 DDD (DEGENERATIVE DISC DISEASE), LUMBAR: ICD-10-CM

## 2021-09-07 DIAGNOSIS — G89.29 CHRONIC BILATERAL LOW BACK PAIN WITH RIGHT-SIDED SCIATICA: ICD-10-CM

## 2021-09-07 DIAGNOSIS — M25.551 HIP PAIN, RIGHT: ICD-10-CM

## 2021-09-07 DIAGNOSIS — M54.59 LUMBAR FACET JOINT PAIN: ICD-10-CM

## 2021-09-07 DIAGNOSIS — F11.20 UNCOMPLICATED OPIOID DEPENDENCE (H): ICD-10-CM

## 2021-09-07 DIAGNOSIS — M45.7 ANKYLOSING SPONDYLITIS OF LUMBOSACRAL REGION (H): ICD-10-CM

## 2021-09-08 ENCOUNTER — TELEPHONE (OUTPATIENT)
Dept: SURGERY | Facility: CLINIC | Age: 56
End: 2021-09-08

## 2021-09-08 ENCOUNTER — MYC MEDICAL ADVICE (OUTPATIENT)
Dept: PALLIATIVE MEDICINE | Facility: CLINIC | Age: 56
End: 2021-09-08

## 2021-09-08 ENCOUNTER — OFFICE VISIT (OUTPATIENT)
Dept: SURGERY | Facility: CLINIC | Age: 56
End: 2021-09-08
Payer: COMMERCIAL

## 2021-09-08 VITALS
DIASTOLIC BLOOD PRESSURE: 93 MMHG | SYSTOLIC BLOOD PRESSURE: 139 MMHG | OXYGEN SATURATION: 96 % | RESPIRATION RATE: 20 BRPM | HEART RATE: 72 BPM

## 2021-09-08 DIAGNOSIS — K21.00 HIATAL HERNIA WITH GERD AND ESOPHAGITIS: Primary | ICD-10-CM

## 2021-09-08 DIAGNOSIS — K44.9 HIATAL HERNIA WITH GERD AND ESOPHAGITIS: Primary | ICD-10-CM

## 2021-09-08 PROCEDURE — 99214 OFFICE O/P EST MOD 30 MIN: CPT | Performed by: SURGERY

## 2021-09-08 RX ORDER — BUPRENORPHINE AND NALOXONE 8; 2 MG/1; MG/1
FILM, SOLUBLE BUCCAL; SUBLINGUAL
Qty: 60 EACH | Refills: 0 | Status: SHIPPED | OUTPATIENT
Start: 2021-09-08 | End: 2021-10-20 | Stop reason: DRUGHIGH

## 2021-09-08 ASSESSMENT — PAIN SCALES - GENERAL: PAINLEVEL: EXTREME PAIN (9)

## 2021-09-08 NOTE — TELEPHONE ENCOUNTER
Hi Les-     When is the surgery scheduled? Once I know when the surgery is scheduled, then we can work backwards and taper you off of the Suboxone. I can also reach out to your surgeon if they are with [facility] as well. Sometimes, people stay on Suboxone and take short-acting medication on top of it which can also work well.   Is the surgery a day surgical procedure? Will you be staying overnight in the hospital?     Do you have strong feelings about tapering off of the  Suboxone or would you prefer to continue it and potentially medicate over it.      I will look forward to your answers.      Also, it appears you will be needing a refill of the suboxone soon. Remember to call for a refill in time.      Susana GRANT RN CNP, XUAN  [facility]  [facility] [place]    I have sent the above [employer] message to the patient.     Susana GRANT RN CNP, XUAN  [facility]  [facility] [place]

## 2021-09-08 NOTE — PROGRESS NOTES
Patient seen for going over his pH and manometry results    HPI:  Patient is a 55 year old male  with complaints of ongoing episodes of occasionally severe heartburn despite omeprazole therapy.  Some shortness of breath symptoms as well.  With our previous virtual visit he had noted some improvement with the heartburn on omeprazole though currently states that he he is now getting again.    From original visit:  Patient is a 55 year old male  with complaints of some shortness of breath, notices that it is worse after eating  Was worked up with pulmonology in past, no cause found  Seems to be getting worse  When feels like he can't take that deep breath can cause some anxiety  The patient noticed the symptoms about 4 years ago.   Daily symptoms  Ended up going to the ER with this, was told might be related to his hiatal hernia  Knew about hiatal hernia from perhaps an esophagram long ago, maybe 10 years ago or so. Has had other scans and thinks may have been seen on that as well.  Does get occasional heartburn. Mostly watches what and when he eats which controls things for the most part. Tums as needed. Maybe a few times a week.  No previous endoscopy  Has an albuterol inhaler which does not do anything  Patient has not family history of similar problems    Review Of Systems    Skin: negative  Ears/Nose/Throat: negative  Respiratory: Shortness of breath  Cardiovascular: negative  Gastrointestinal: heartburn and reflux  Genitourinary: negative  Musculoskeletal: back pain  Neurologic: negative  Hematologic/Lymphatic/Immunologic: negative  Endocrine: negative      Past Medical History:   Diagnosis Date     Ankylosing spondylitis (H) 3/1/2017     Anxiety      Arthritis     back, knees     Back pain     possible drug seeking behavior in the past.      Gastroesophageal reflux disease      Hypertension      Panic attacks        Past Surgical History:   Procedure Laterality Date     ARTHROSCOPY KNEE Right 9/22/2016     Procedure: ARTHROSCOPY KNEE;  Surgeon: Fredo Hughes MD;  Location: [facility] OR     COMBINED ESOPHAGOSCOPY, GASTROSCOPY, DUODENOSCOPY (EGD) WITH CO2 INSUFFLATION N/A 1/19/2021    Procedure: ESOPHAGOGASTRODUODENOSCOPY, WITH CO2 INSUFFLATION;  Surgeon: Brandon Mack MD;  Location: [facility] OR     ESOPHAGOSCOPY, GASTROSCOPY, DUODENOSCOPY (EGD), COMBINED N/A 1/19/2021    Procedure: Esophagogastroduodenoscopy, With Biopsy;  Surgeon: Brandon Mack MD;  Location: [facility] OR     ESOPHAGOSCOPY, GASTROSCOPY, DUODENOSCOPY (EGD), COMBINED N/A 5/27/2021    Procedure: ESOPHAGOGASTRODUODENOSCOPY (EGD);  Surgeon: Chester Lynn MD;  Location: [facility]     INJECT PARAVERTEBRAL FACET JOINT LUMBAR / SACRAL FIRST Right 11/25/2016    Procedure: INJECT PARAVERTEBRAL FACET JOINT LUMBAR / SACRAL FIRST;  Surgeon: Doretha Magallanes MD;  Location: [facility] OR     ORTHOPEDIC SURGERY      Rt knee X 2     ORTHOPEDIC SURGERY      Lt foot       Social History     Socioeconomic History     Marital status:      Spouse name: Not on file     Number of children: Not on file     Years of education: Not on file     Highest education level: Not on file   Occupational History     Not on file   Tobacco Use     Smoking status: Never Smoker     Smokeless tobacco: Former User     Types: Chew     Tobacco comment: Chew   Substance and Sexual Activity     Alcohol use: No     Comment: none     Drug use: Yes     Frequency: 7.0 times per week     Types: Marijuana     Sexual activity: Yes     Partners: Female     Comment: decreased with Prozac   Other Topics Concern     Parent/sibling w/ CABG, MI or angioplasty before 65F 55M? Not Asked   Social History Narrative     Not on file     Social Determinants of Health     Financial Resource Strain:      Difficulty of Paying Living Expenses:    Food Insecurity:      Worried About Running Out of Food in the Last Year:      Ran Out of Food in the Last Year:    Transportation Needs:      Lack of  Transportation (Medical):      Lack of Transportation (Non-Medical):    Physical Activity:      Days of Exercise per Week:      Minutes of Exercise per Session:    Stress:      Feeling of Stress :    Social Connections:      Frequency of Communication with Friends and Family:      Frequency of Social Gatherings with Friends and Family:      Attends Christian Services:      Active Member of Clubs or Organizations:      Attends Club or Organization Meetings:      Marital Status:    Intimate Partner Violence:      Fear of Current or Ex-Partner:      Emotionally Abused:      Physically Abused:      Sexually Abused:        Current Outpatient Medications   Medication Sig Dispense Refill     acetaminophen (TYLENOL) 500 MG tablet Take 1,000 mg by mouth every 6 hours as needed for mild pain (headache)       buprenorphine HCl-naloxone HCl (SUBOXONE) 8-2 MG per film Use 0.5 film under the tongue 4 times per day for pain, approximately every 6 hours. Max 2 films per day. Fill now. Start now. 30 day supply 60 each 0     chlorzoxazone (PARAFON FORTE) 500 MG tablet Take 1 tablet (500 mg) by mouth 3 times daily as needed for muscle spasms 45 tablet 1     clonazePAM (KLONOPIN) 1 MG tablet TAKE ONE-HALF - 1 TABLET BY MOUTH ONCE DAILY AS NEEDED FOR ANXIETY 5 tablet 0     etanercept (ENBREL SURECLICK) 50 MG/ML autoinjector Inject 50 mg Subcutaneous once a week 1 mL 3     fish oil-omega-3 fatty acids 1000 MG capsule Take 1 g by mouth daily       FLUoxetine (PROZAC) 40 MG capsule Take 1 capsule (40 mg) by mouth daily 30 capsule 1     folic acid (FOLVITE) 1 MG tablet Take 5 tablets (5 mg) by mouth daily 150 tablet 3     liothyronine (CYTOMEL) 25 MCG tablet Take 1 tablet (25 mcg) by mouth daily 30 tablet 1     medical cannabis (Patient's own supply) See Admin Instructions (The purpose of this order is to document that the patient reports taking medical cannabis.  This is not a prescription, and is not used to certify that the patient has a  qualifying medical condition.)     Pills PRN   Lozenges PRN       methotrexate sodium 2.5 MG TABS TAKE 4 TABLETS BY MOUTH EVERY 7 DAYS 48 tablet 0     multivitamin (CENTRUM SILVER) tablet Take 1 tablet by mouth daily       nicotine polacrilex (NICORETTE) 4 MG gum PLACE 1 PIECE INSIDE CHEEK AS NEEDED FOR SMOKING CESSATION 240 each 1     omeprazole (PRILOSEC) 20 MG DR capsule Take 20 mg by mouth daily as needed        vitamin D2 (ERGOCALCIFEROL) 55752 units (1250 mcg) capsule Take 50,000 Units by mouth twice a week mon and thurs       atenolol (TENORMIN) 25 MG tablet Take 1 tablet (25 mg) by mouth daily (Patient not taking: Reported on 9/8/2021) 90 tablet 1     prazosin (MINIPRESS) 1 MG capsule Take 1 capsule (1 mg) by mouth At Bedtime (Patient not taking: Reported on 9/8/2021) 30 capsule 1     QUEtiapine (SEROQUEL) 100 MG tablet Take 1.5 tablets (150 mg) by mouth At Bedtime (Patient not taking: Reported on 9/8/2021) 45 tablet 1     QUEtiapine (SEROQUEL) 25 MG tablet Take 1-2 tablets (25-50 mg) by mouth daily as needed (for severe anxiety/panic attacks or sleep) (Patient not taking: Reported on 9/8/2021) 30 tablet 0       Medications and history reviewed    Physical exam:  Vitals: BP (!) 139/93 (BP Location: Left arm, Patient Position: Sitting, Cuff Size: Adult Regular)   Pulse 72   Resp 20   SpO2 96%   BMI= There is no height or weight on file to calculate BMI.    Constitutional: healthy, alert and no distress  Head: Normocephalic. No masses, lesions, tenderness or abnormalities  Gastrointestinal: Abdomen soft, non-tender. positive findings: obese    Manometry:  Basal IRP 17.6, residual 17.2 which is elevated consistent with EGJOO  The residual IRP did improve and normalized to 10 or less with upright and rapid swallowing maneuvers.    PH testing:  First day had normal acid exposure but was abnormal days 2 through 4  Average DeMeester score 56.3  Symptom index and symptom association probability was not positive  for his symptoms of reflux heartburn and shortness of breath.  80% of swallows were intact.  He did have 20% pan esophageal pressurization, 20% failed, 20% ineffective, 30% premature contraction  Normal DCI pressures  Interpretation felt that with the use normalization with provocative maneuvers the elevated IRP and other peristalsis issues were likely opioid effect versus chronic reflux    Assessment:     ICD-10-CM    1. Hiatal hernia with GERD and esophagitis  K44.9 Jacqueline-Operative Worksheet    K21.00      Plan: Went over pH and manometry results with him as noted above he had positive pH and manometry had some elevated residual IRP however this improved with provocative maneuvers which would lean towards less of an issue with postoperative dysphagia then if they did not augment.  Despite the augmentation with some of the other peristalsis abnormalities I offered a hiatal hernia repair with toupet fundoplication.  I believe his shortness of breath is likely reflux related as no other pulmonary cause has been able to be found but I did discuss that it is difficult to predict.  Described procedure details and overnight stay, post operative dietary restrictions as well as difficulty with belching and vomiting afterwards, possibility of increased flatulence. Risks of bleeding, infection, conversion to open, dysphagia, recurrence of hiatal hernia or reflux symptoms, injury to intra-abdominal or intra-thoracic organs discussed and questions answered.  He would like to go ahead and proceed.  We will work on scheduling for him.      Brandon Mack MD

## 2021-09-08 NOTE — LETTER
9/8/2021         RE: Jailene Breen  [address]   [place] [address]        Dear Colleague,    Thank you for referring your patient, Jailene Breen, to the [facility]. Please see a copy of my visit note below.    Patient seen for going over his pH and manometry results    HPI:  Patient is a 55 year old male  with complaints of ongoing episodes of occasionally severe heartburn despite omeprazole therapy.  Some shortness of breath symptoms as well.  With our previous virtual visit he had noted some improvement with the heartburn on omeprazole though currently states that he he is now getting again.    From original visit:  Patient is a 55 year old male  with complaints of some shortness of breath, notices that it is worse after eating  Was worked up with pulmonology in past, no cause found  Seems to be getting worse  When feels like he can't take that deep breath can cause some anxiety  The patient noticed the symptoms about 4 years ago.   Daily symptoms  Ended up going to the ER with this, was told might be related to his hiatal hernia  Knew about hiatal hernia from perhaps an esophagram long ago, maybe 10 years ago or so. Has had other scans and thinks may have been seen on that as well.  Does get occasional heartburn. Mostly watches what and when he eats which controls things for the most part. Tums as needed. Maybe a few times a week.  No previous endoscopy  Has an albuterol inhaler which does not do anything  Patient has not family history of similar problems    Review Of Systems    Skin: negative  Ears/Nose/Throat: negative  Respiratory: Shortness of breath  Cardiovascular: negative  Gastrointestinal: heartburn and reflux  Genitourinary: negative  Musculoskeletal: back pain  Neurologic: negative  Hematologic/Lymphatic/Immunologic: negative  Endocrine: negative      Past Medical History:   Diagnosis Date     Ankylosing spondylitis (H) 3/1/2017     Anxiety      Arthritis      back, knees     Back pain     possible drug seeking behavior in the past.      Gastroesophageal reflux disease      Hypertension      Panic attacks        Past Surgical History:   Procedure Laterality Date     ARTHROSCOPY KNEE Right 9/22/2016    Procedure: ARTHROSCOPY KNEE;  Surgeon: Fredo Hughes MD;  Location: [facility] OR     COMBINED ESOPHAGOSCOPY, GASTROSCOPY, DUODENOSCOPY (EGD) WITH CO2 INSUFFLATION N/A 1/19/2021    Procedure: ESOPHAGOGASTRODUODENOSCOPY, WITH CO2 INSUFFLATION;  Surgeon: Brandon Mack MD;  Location: [facility] OR     ESOPHAGOSCOPY, GASTROSCOPY, DUODENOSCOPY (EGD), COMBINED N/A 1/19/2021    Procedure: Esophagogastroduodenoscopy, With Biopsy;  Surgeon: Brandon Mack MD;  Location: [facility]     ESOPHAGOSCOPY, GASTROSCOPY, DUODENOSCOPY (EGD), COMBINED N/A 5/27/2021    Procedure: ESOPHAGOGASTRODUODENOSCOPY (EGD);  Surgeon: Chester Lynn MD;  Location: [facility]     INJECT PARAVERTEBRAL FACET JOINT LUMBAR / SACRAL FIRST Right 11/25/2016    Procedure: INJECT PARAVERTEBRAL FACET JOINT LUMBAR / SACRAL FIRST;  Surgeon: Doretha Magallanes MD;  Location: [facility]     ORTHOPEDIC SURGERY      Rt knee X 2     ORTHOPEDIC SURGERY      Lt foot       Social History     Socioeconomic History     Marital status:      Spouse name: Not on file     Number of children: Not on file     Years of education: Not on file     Highest education level: Not on file   Occupational History     Not on file   Tobacco Use     Smoking status: Never Smoker     Smokeless tobacco: Former User     Types: Chew     Tobacco comment: Chew   Substance and Sexual Activity     Alcohol use: No     Comment: none     Drug use: Yes     Frequency: 7.0 times per week     Types: Marijuana     Sexual activity: Yes     Partners: Female     Comment: decreased with Prozac   Other Topics Concern     Parent/sibling w/ CABG, MI or angioplasty before 65F 55M? Not Asked   Social History Narrative     Not on file     Social Determinants of  Health     Financial Resource Strain:      Difficulty of Paying Living Expenses:    Food Insecurity:      Worried About Running Out of Food in the Last Year:      Ran Out of Food in the Last Year:    Transportation Needs:      Lack of Transportation (Medical):      Lack of Transportation (Non-Medical):    Physical Activity:      Days of Exercise per Week:      Minutes of Exercise per Session:    Stress:      Feeling of Stress :    Social Connections:      Frequency of Communication with Friends and Family:      Frequency of Social Gatherings with Friends and Family:      Attends Temple Services:      Active Member of Clubs or Organizations:      Attends Club or Organization Meetings:      Marital Status:    Intimate Partner Violence:      Fear of Current or Ex-Partner:      Emotionally Abused:      Physically Abused:      Sexually Abused:        Current Outpatient Medications   Medication Sig Dispense Refill     acetaminophen (TYLENOL) 500 MG tablet Take 1,000 mg by mouth every 6 hours as needed for mild pain (headache)       buprenorphine HCl-naloxone HCl (SUBOXONE) 8-2 MG per film Use 0.5 film under the tongue 4 times per day for pain, approximately every 6 hours. Max 2 films per day. Fill now. Start now. 30 day supply 60 each 0     chlorzoxazone (PARAFON FORTE) 500 MG tablet Take 1 tablet (500 mg) by mouth 3 times daily as needed for muscle spasms 45 tablet 1     clonazePAM (KLONOPIN) 1 MG tablet TAKE ONE-HALF - 1 TABLET BY MOUTH ONCE DAILY AS NEEDED FOR ANXIETY 5 tablet 0     etanercept (ENBREL SURECLICK) 50 MG/ML autoinjector Inject 50 mg Subcutaneous once a week 1 mL 3     fish oil-omega-3 fatty acids 1000 MG capsule Take 1 g by mouth daily       FLUoxetine (PROZAC) 40 MG capsule Take 1 capsule (40 mg) by mouth daily 30 capsule 1     folic acid (FOLVITE) 1 MG tablet Take 5 tablets (5 mg) by mouth daily 150 tablet 3     liothyronine (CYTOMEL) 25 MCG tablet Take 1 tablet (25 mcg) by mouth daily 30 tablet 1      medical cannabis (Patient's own supply) See Admin Instructions (The purpose of this order is to document that the patient reports taking medical cannabis.  This is not a prescription, and is not used to certify that the patient has a qualifying medical condition.)     Pills PRN   Lozenges PRN       methotrexate sodium 2.5 MG TABS TAKE 4 TABLETS BY MOUTH EVERY 7 DAYS 48 tablet 0     multivitamin (CENTRUM SILVER) tablet Take 1 tablet by mouth daily       nicotine polacrilex (NICORETTE) 4 MG gum PLACE 1 PIECE INSIDE CHEEK AS NEEDED FOR SMOKING CESSATION 240 each 1     omeprazole (PRILOSEC) 20 MG DR capsule Take 20 mg by mouth daily as needed        vitamin D2 (ERGOCALCIFEROL) 88652 units (1250 mcg) capsule Take 50,000 Units by mouth twice a week mon and thurs       atenolol (TENORMIN) 25 MG tablet Take 1 tablet (25 mg) by mouth daily (Patient not taking: Reported on 9/8/2021) 90 tablet 1     prazosin (MINIPRESS) 1 MG capsule Take 1 capsule (1 mg) by mouth At Bedtime (Patient not taking: Reported on 9/8/2021) 30 capsule 1     QUEtiapine (SEROQUEL) 100 MG tablet Take 1.5 tablets (150 mg) by mouth At Bedtime (Patient not taking: Reported on 9/8/2021) 45 tablet 1     QUEtiapine (SEROQUEL) 25 MG tablet Take 1-2 tablets (25-50 mg) by mouth daily as needed (for severe anxiety/panic attacks or sleep) (Patient not taking: Reported on 9/8/2021) 30 tablet 0       Medications and history reviewed    Physical exam:  Vitals: BP (!) 139/93 (BP Location: Left arm, Patient Position: Sitting, Cuff Size: Adult Regular)   Pulse 72   Resp 20   SpO2 96%   BMI= There is no height or weight on file to calculate BMI.    Constitutional: healthy, alert and no distress  Head: Normocephalic. No masses, lesions, tenderness or abnormalities  Gastrointestinal: Abdomen soft, non-tender. positive findings: obese    Manometry:  Basal IRP 17.6, residual 17.2 which is elevated consistent with EGJOO  The residual IRP did improve and normalized to 10  or less with upright and rapid swallowing maneuvers.    PH testing:  First day had normal acid exposure but was abnormal days 2 through 4  Average DeMeester score 56.3  Symptom index and symptom association probability was not positive for his symptoms of reflux heartburn and shortness of breath.  80% of swallows were intact.  He did have 20% pan esophageal pressurization, 20% failed, 20% ineffective, 30% premature contraction  Normal DCI pressures  Interpretation felt that with the use normalization with provocative maneuvers the elevated IRP and other peristalsis issues were likely opioid effect versus chronic reflux    Assessment:     ICD-10-CM    1. Hiatal hernia with GERD and esophagitis  K44.9 Jacqueline-Operative Worksheet    K21.00      Plan: Went over pH and manometry results with him as noted above he had positive pH and manometry had some elevated residual IRP however this improved with provocative maneuvers which would lean towards less of an issue with postoperative dysphagia then if they did not augment.  Despite the augmentation with some of the other peristalsis abnormalities I offered a hiatal hernia repair with toupet fundoplication.  I believe his shortness of breath is likely reflux related as no other pulmonary cause has been able to be found but I did discuss that it is difficult to predict.  Described procedure details and overnight stay, post operative dietary restrictions as well as difficulty with belching and vomiting afterwards, possibility of increased flatulence. Risks of bleeding, infection, conversion to open, dysphagia, recurrence of hiatal hernia or reflux symptoms, injury to intra-abdominal or intra-thoracic organs discussed and questions answered.  He would like to go ahead and proceed.  We will work on scheduling for him.      Brandon Mack MD        Again, thank you for allowing me to participate in the care of your patient.        Sincerely,        Brandon Mack,  MD

## 2021-09-08 NOTE — TELEPHONE ENCOUNTER
Routed to the nursing pool and to the MA pool to process refill(s).    Cha Molina, RN-BSN  [facility]

## 2021-09-08 NOTE — TELEPHONE ENCOUNTER
Received call from patient requesting refill(s) of buprenorphine HCl-naloxone HCl (SUBOXONE) 8-2 MG per film    Last dispensed from pharmacy on 08/09/21    Patient's last office/virtual visit by prescribing provider on 04/21/21  Next office/virtual appointment scheduled for : none    Last urine drug screen date 06/15/21  Current opioid agreement on file (completed within the last year) No Date of opioid agreement: 01/21/20    E-prescribe to pharmacy[id number] - [place] - [address]     Will route to nursing [place] for review and preparation of prescription(s).

## 2021-09-08 NOTE — TELEPHONE ENCOUNTER
Per patient myChart message:  From: Jamison Breen      Created: 9/8/2021 3:06 PM      Hello, I'm letting you know that I'm going to be having surgery to fix a hernia on my stomach and to fix my esophagus. What should I do about taking my suboxone since I will be staying in the hospital overnight for at least one day and don't want it to interfere with the pain control after surgery?  Do I stop taking a week before and do I need to slowly cut the dose down or can it just be stopped?        Routed to provider.     Cha RN-BSN  [facility]

## 2021-09-08 NOTE — TELEPHONE ENCOUNTER
Type of surgery: Robotic assisted laparoscopic hiatal hernia repair partial fundoplication possible mesh   CPT 10148   Hiatal hernia with GERD and esophagitis K44.9, K21.00    Location of surgery: [facility]  Date and time of surgery: 10/14/2021  Surgeon: Martina  Pre-Op Appt Date: 10/11/2021  Post-Op Appt Date: 10/27/2021   Packet sent out: Yes  Pre-cert/Authorization completed:    Per Availity:  Transaction ID: [id number] ID: [id number] Date: 2021-09-09  No Authorization Required  Member ID  Group Number  [id number]  Line of Business  Commercial  Date of Service  2021-10-14  Procedure Code 1  12004    Reference Number  [id number]  Status  NO AUTH REQUIRED  Date: 09/09/21    Thank you,   Katerina Aguilar   Prior Authorization [facility]  [phone]

## 2021-09-08 NOTE — NURSING NOTE
Jailene Breen's goals for this visit include:   Chief Complaint   Patient presents with     Discuss results     He requests these members of his care team be copied on today's visit information:     PCP: Aiden Resendez    Referring Provider:  No referring provider defined for this encounter.    BP (!) 139/93 (BP Location: Left arm, Patient Position: Sitting, Cuff Size: Adult Regular)   Pulse 72   Resp 20   SpO2 96%     Do you need any medication refills at today's visit? No    Ene Nelson LPN on 9/8/2021 at 1:55 PM

## 2021-09-08 NOTE — TELEPHONE ENCOUNTER
Medication refill information reviewed.     Due date:  anytime      Prescriptions prepped for review.     SANDRA Eubanks-BSN  [facility]

## 2021-09-09 ENCOUNTER — HOSPITAL ENCOUNTER (OUTPATIENT)
Dept: BEHAVIORAL HEALTH | Facility: CLINIC | Age: 56
End: 2021-09-09
Attending: PSYCHIATRY & NEUROLOGY
Payer: COMMERCIAL

## 2021-09-09 PROCEDURE — 90853 GROUP PSYCHOTHERAPY: CPT | Mod: 95

## 2021-09-09 NOTE — TELEPHONE ENCOUNTER
Signed Prescriptions:                        Disp   Refills    buprenorphine HCl-naloxone HCl (SUBOXONE) *60 each0        Sig: Use 0.5 film under the tongue 4 times per day for           pain, approximately every 6 hours. Max 2 films           per day. Fill/begin 9/8/2021. 30 day supply  Authorizing Provider: SUSANA PETTY    Reviewed [place]  September 8, 2021- no concerning fills.  Please let patient know he must make and keep an appointment with me (in-person or video) PRIOR to his next refill. Additionally, have nursing reach out to patient to set up encounter for him to come in for UDS and to re-sign CSA.   Thanks.    Susana Petty APRN, RN CNP, FNP  [facility]  [facility]

## 2021-09-09 NOTE — TELEPHONE ENCOUNTER
Spoke to patient, notified that prescription was sent to preferred pharmacy.    Able to fill  and start 9/08/21    ----------    Reminded patient to make in person or video visit provider prior to next refill.      Joy Kessler MA  [facility] [place]

## 2021-09-09 NOTE — GROUP NOTE
Process Group Note    PATIENT'S NAME: Jailene Breen  MRN:   [id number]  :   1965  ACCT. NUMBER: [id number]  DATE OF SERVICE: 21  START TIME:  9:00 AM  END TIME: 10:50 AM  FACILITATOR: Venecia Quesada  TOPIC: MH Process Group    Diagnoses:  296.32 Major Depressive Disorder, recurrent, moderate  300.022 JEFF  300.01  Panic Disorder       [facility]  TRACK: Clinic 2                                      Service Modality:  Video Visit     Telemedicine Visit: The patient's condition can be safely assessed and treated via synchronous audio and visual telemedicine encounter.      Reason for Telemedicine Visit:  covid 19     Originating Site (Patient Location): Patient's home    Distant Site (Provider Location): Provider Remote Setting- Home Office    Consent:  The patient/guardian has verbally consented to: the potential risks and benefits of telemedicine (video visit) versus in person care; bill my insurance or make self-payment for services provided; and responsibility for payment of non-covered services.     Patient would like the video invitation sent by:  My Chart    Mode of Communication:  Video Conference via Medical Zoom    As the provider I attest to compliance with applicable laws and regulations related to telemedicine.          NUMBER OF PARTICIPANTS: 8          Data:    Session content: At the start of this group, patients were invited to check in by identifying themselves, describing their current emotional status, and identifying issues to address in this group.   Area(s) of treatment focus addressed in this session included Symptom Management, Personal Safety, Develop / Improve Independent Living Skills and Develop Socialization / Interpersonal Relationship Skills.  Jamison reported feeling overwhelmed.  He reported his uncle is dying and he is upset about this but also angry his mom did not tell him.  He reported needing surgery next month and while it is  not serious he is catastrophizing.  He does not like feeling out of control.  He reported having parasuicidal ideation with no plan or intent. He reported sleep has been poor and meditations have not been helpful.  He reported using medical marijuana for weed pain and depression, he feels it helps depression but not pain.     Therapeutic Interventions/Treatment Strategies:  Psychotherapist offered support, feedback and validation and reinforced use of skills. Treatment modalities used include Motivational Interviewing, Cognitive Behavioral Therapy and Dialectical Behavioral Therapy. Validated and normalized.  Highlighted and reinforced skills used; connected to positive outcomes.  Provided additional skill suggestions.  Aided in creating a plan to help work towards goals.        Assessment:    Patient response:   Patient responded to session by accepting feedback, giving feedback, listening, focusing on goals, being attentive and accepting support    Possible barriers to participation / learning include: and no barriers identified    Health Issues:   None reported       Substance Use Review:   Substance Use: No active concerns identified.    Mental Status/Behavioral Observations  Appearance:   Appropriate   Eye Contact:   Good   Psychomotor Behavior: Normal   Attitude:   Cooperative   Orientation:   All  Speech   Rate / Production: Normal    Volume:  Normal   Mood:    Anxious  Depressed  Grieving  Affect:    Appropriate   Thought Content:   Rumination and Safety reports  presence of suicidal ideation passive suicidal ideation   Thought Form:  Coherent  Logical     Insight:    Good     Plan:     Safety Plan: Committed to safety and agreed to follow previously developed safety coping plan.      Barriers to treatment: None identified    Patient Contracts (see media tab):  None    Substance Use: Not addressed in session     Continue or Discharge: Patient will continue in Adult Day Treatment (ADT)  as planned. Patient is  likely to benefit from learning and using skills as they work toward the goals identified in their treatment plan.      Venecia Quesada  September 9, 2021

## 2021-09-09 NOTE — TELEPHONE ENCOUNTER
Per patient Daryahart message:  From: Jamison Breen      Created: 9/8/2021 6:07 PM      Hello, the surgery is on October 14, it supposed to be an overnight stay but because of covid might be only there the day unless really needed.  It's all in the [facility] and I would rather taper off it so we can control the pain better after surgery.        Routed to provider.     SANDRA Eubanks-BSN  [facility]

## 2021-09-13 NOTE — PROGRESS NOTES
"VIDEO VISIT  Jailene Breen is a 55 year old patient who is being evaluated via a billable video visit.      The patient has been notified of following:   \"We have found that certain health care needs can be provided without the need for an in-person physical exam. This service lets us provide the care you need with a video conversation. If a prescription is necessary we can send it directly to your pharmacy. If lab work is needed we can place an order for that and you can then stop by our lab to have the test done at a later time. Insurers are generally covering virtual visits as they would in-office visits so billing should not be different than normal.  If for some reason you do get billed incorrectly, you should contact the billing office to correct it and that number is in the AVS .    Patient has given verbal consent for video visit?: Yes   How would you like to obtain your AVS?: MetaStat  AVS SmartPhrase [PsychAVS] has been placed in 'Patient Instructions': Yes      Video- Visit Details  Type of service:  video visit for medication management  Time of service:    Date:  09/14/2021    Video Start Time:  9:35 AM        Video End Time: 10:00 AM    Reason for video visit:  Patient unable to travel due to Covid-19  Originating Site (patient location):  [facility]   Location- Patient's home  Distant Site (provider location):  Remote location  Mode of Communication:  Video Conference via AmWell  Consent:  Patient has given verbal consent for video visit?: Yes        [facility]  Psychiatry Clinic  PSYCHIATRY PROGRESS NOTE     CARE TEAM:    PCP- Aiden Resendez   Rheumatology- Raghu Parada MD  [facility]- Susana Pike APRN; Santa Farrlel PsyD    Jailene Breen is a 55 year old patient who prefers the name Les and uses pronouns he, him, his.   PERTINENT BACKGROUND                                 [most recent eval 09/21/20]   Psych pertinent " item history includes suicidal ideation, SIB [cutting], mutiple psychotropic trials , severe med reaction, psych hosp (<3), SUBSTANCE USE: alcohol, substance use treatment  and Major Medical Problems (Rheumatoid Arthritis and Ankylosing Spondylitis)    DIAGNOSES                       Major Depressive Disorder, recurrent, severe, without psychotic features  Generalized Anxiety Disorder, with panic    Chronic Pain  Ankylosing Spondylitis    ASSESSMENT                       Today, reports interim stability of depression and resolution of suicidal ideation.  Continues to site upcoming stressors which he has been handling well. He continues with outpatient therapy group, which seems to have provided considerable benefit in processing recent stressors.  However, at this point would recommend initiation with DBT group to help provide some additional coping strategies and increased structure.  We will place DBT referral for [facility] today as he has had difficulty following up with previous referrals.  Reports ongoing desire to was established with individual psychotherapist, however has not initiated.  Discussed possible medication changes (increase quetiapine or fluoxetine) to target ongoing depression.  Today, patient desires to not make any changes. If no improvement in symptoms over the next several weeks would increase fluoxetine to maximize that therapy prior to additional increases in quetiapine.  Plan today to continue holding prazosin as he has not been taking this medication for an extended period of time and denies nightmares or ongoing difficulties with sleep.  Patient plans to obtain antipsychotic monitoring labs, forgot following last appointment. No safety concerns. Refills provided.     [place]NPMP was checked today:  Indicates no medication misuse .    PLAN                                                                                   1) Medications:  - Continue fluoxetine 40mg daily   - Continue  "quetiapine 150mg at bedtime  - Continue quetiapine 25-50mg as needed for anxiety and panic  - Stopped prazosin 1mg at bedtime   - Continue liothyronine 25mcg daily    2) Therapy- Participating in group therapy; working on setting up individual and DBT    3) Next Due   Labs- AP monitoring labs due   EKG- as needed  Rating scales- serial PHQ9s    4) Referrals  Therapy- DBT therapy referral placed and several additional options given.    5) RTC: 4-6 weeks    6) Crisis Numbers:   Provided routinely in AVS     After hours:  [phone]    INTERIM HISTORY        The patient reports good treatment adherence.  History was provided by the patient who was a good historian.     Since the last visit:   -Increased quetiapine 150mg at bedtime  -Reports feeling \"tired\" has not been able to sleep well recently.  -Has been doing \"about the same\" since last appointment. \"Nothing really improving\" but \"not really worse either.\"  -Notes that he \"has a lot on my mind:\" court case, hiatal hernia repair, \"uncle who's dying\" (but mom couldn't call and tell him about that, which was disappointing to him).   -Has not been taking prazosin for \"a long time,\" which he realized when reviewing medications with his .  -Panic attacks \"have been a lot better.\" Has not had any since last appointment and has not needed any as needed quetiapine.  -\"Still has a lot of things on my mind.\"  -Has not been seeing an individual therapist. Has looked at therapists, thinking about calling his previous therapist.  He is planning to restart couples therapy with his wife in the near future.  -Reports feeling low energy, feeling hopeless, and worthless.  However, does note improvement in appetite since last appointment.  -Forgot to follow-up on antipsychotic monitoring labs, plans to do this prior to next appointment as he also needs rheumatology labs.  -Has not followed up on DBT referral, is still interested in would be interested in referral through " "[place].  -No suicidal thoughts \"or plan or anything.\"  -Does not want to make any medication changes today.    RECENT PSYCH ROS:   Depression: depressed mood, anhedonia, low energy, feeling worthless and feeling hopeless   Elevated:  none  Psychosis:  none  Anxiety:  as above  Trauma Related:  none  Dysregulation:  none  Eating Disorder: no  Other: no    Adverse Effects:  denies  Pertinent Negative Symptoms: No violent ideation, aggression, psychosis, hallucinations, delusions, otto and hypomania    Recent Substance Use:     Alcohol- none since , used to attend AA meetings prior to COVID.  Tobacco- denies  Caffeine- yes  Cannabis- denies     Opioids- as prescribed           Narcan Kit- prescribed   Other illicit drugs- none    FAMILY and SOCIAL HISTORY                                      [per patient report]            Family Hx:  Mom - depression (since  when sister )    Social Hx:  Financial/ Work- currently attending college at [employer], is hoping to start school at [employer] for a degree in psychology after being unable to work due to diagnosis of rheumatoid arthritis and ankylosing spondylitis  Partner/ -  in .  Children- 25 and 27 year old kids      Living situation- [place], house with wife and 2 kids      Social/ Spiritual Support- Talks to  every two weeks for lunch, but \"doesn't really have friends\". Family is supportive.      PSYCH and SUBSTANCE USE Critical Summary Points since  - started escitalopram  November - increased escitalopram 20mg daily  February - stopped hydroxyzine; started trazodone 50mg at bedtime and 25mg daily as needed for anxiety   - Patient hospitalized started on Abilify, titrated to 5mg daily; escitalopram switched to fluoxetine 40mg daily; started prazosin 1mg at bedtime. Pain management switched oxycodone to Suboxone and started medical marijuana.  May - patient still taking hydroxyzine, discontinued " today.  June - patient hospitalized (6/15/21) discontinued Abilify and trazodone, started on quetiapine & titrated to 100mg at bedtime and 25mg as needed for sleep.   August - increase quetiapine 150mg     MEDICAL HISTORY  and ALLERGY     ALLERGIES: Mirtazapine, Hydrocodone, Ms contin [morphine], and Remeron soltab     Patient Active Problem List   Diagnosis     CARDIOVASCULAR SCREENING; LDL GOAL LESS THAN 160     Anxiety     Overweight (BMI 25.0-29.9)     Back pain     Tear meniscus knee, right, initial encounter     Rheumatoid arthritis involving multiple sites, unspecified rheumatoid factor presence     Chronic pain     Right-sided thoracic back pain, unspecified chronicity     JEFF (generalized anxiety disorder)     Panic attack     Syncope, unspecified syncope type     Ankylosing spondylitis (H)     Tobacco use disorder     Moderate major depression (H)     Immunocompromised (H)     Essential hypertension     Suicidal ideation     Insomnia due to other mental disorder     Suicide ideation     Depression, unspecified depression type     Major depressive disorder, recurrent episode, severe with mixed features (H)         MEDICAL REVIEW OF SYSTEMS     Contraception- none  A comprehensive review of systems was performed and is negative other than noted in the HPI.    MEDICATIONS          Current Outpatient Medications   Medication Sig Dispense Refill     acetaminophen (TYLENOL) 500 MG tablet Take 1,000 mg by mouth every 6 hours as needed for mild pain (headache)       atenolol (TENORMIN) 25 MG tablet Take 1 tablet (25 mg) by mouth daily (Patient not taking: Reported on 9/8/2021) 90 tablet 1     buprenorphine HCl-naloxone HCl (SUBOXONE) 8-2 MG per film Use 0.5 film under the tongue 4 times per day for pain, approximately every 6 hours. Max 2 films per day. Fill/begin 9/8/2021. 30 day supply 60 each 0     chlorzoxazone (PARAFON FORTE) 500 MG tablet Take 1 tablet (500 mg) by mouth 3 times daily as needed for muscle  spasms 45 tablet 1     clonazePAM (KLONOPIN) 1 MG tablet TAKE ONE-HALF - 1 TABLET BY MOUTH ONCE DAILY AS NEEDED FOR ANXIETY 5 tablet 0     etanercept (ENBREL SURECLICK) 50 MG/ML autoinjector Inject 50 mg Subcutaneous once a week 1 mL 3     fish oil-omega-3 fatty acids 1000 MG capsule Take 1 g by mouth daily       FLUoxetine (PROZAC) 40 MG capsule Take 1 capsule (40 mg) by mouth daily 30 capsule 1     folic acid (FOLVITE) 1 MG tablet Take 5 tablets (5 mg) by mouth daily 150 tablet 3     liothyronine (CYTOMEL) 25 MCG tablet Take 1 tablet (25 mcg) by mouth daily 30 tablet 1     medical cannabis (Patient's own supply) See Admin Instructions (The purpose of this order is to document that the patient reports taking medical cannabis.  This is not a prescription, and is not used to certify that the patient has a qualifying medical condition.)     Pills PRN   Lozenges PRN       methotrexate sodium 2.5 MG TABS TAKE 4 TABLETS BY MOUTH EVERY 7 DAYS 48 tablet 0     multivitamin (CENTRUM SILVER) tablet Take 1 tablet by mouth daily       nicotine polacrilex (NICORETTE) 4 MG gum PLACE 1 PIECE INSIDE CHEEK AS NEEDED FOR SMOKING CESSATION 240 each 1     omeprazole (PRILOSEC) 20 MG DR capsule Take 20 mg by mouth daily as needed        prazosin (MINIPRESS) 1 MG capsule Take 1 capsule (1 mg) by mouth At Bedtime (Patient not taking: Reported on 9/8/2021) 30 capsule 1     QUEtiapine (SEROQUEL) 100 MG tablet Take 1.5 tablets (150 mg) by mouth At Bedtime (Patient not taking: Reported on 9/8/2021) 45 tablet 1     QUEtiapine (SEROQUEL) 25 MG tablet Take 1-2 tablets (25-50 mg) by mouth daily as needed (for severe anxiety/panic attacks or sleep) (Patient not taking: Reported on 9/8/2021) 30 tablet 0     vitamin D2 (ERGOCALCIFEROL) 27870 units (1250 mcg) capsule Take 50,000 Units by mouth twice a week mon and thurs       VITALS                     There were no vitals taken for this visit.   MENTAL STATUS EXAM         Alertness: alert  and  "oriented  Appearance: adequately groomed  Behavior/Demeanor: cooperative, with good  eye contact   Speech: regular rate and rhythm  Language: intact  Psychomotor: normal or unremarkable  Mood: \"okay\"   Affect: appropriate; congruent to: mood- yes, content- yes  Thought Process/Associations: unremarkable  Thought Content:  Reports none;  Denies suicidal ideation, violent ideation and delusions   Perception:  Reports none;  Denies auditory hallucinations and visual hallucinations  Insight: good  Judgment: good  Cognition: (6) oriented: time, person, and place  attention span: intact  concentration: intact  recent memory: intact  remote memory: intact  fund of knowledge: appropriate  Gait and Station: N/A (telehealth)    LABS and DATA     PHQ9 TODAY = 18  PHQ 3/30/2021 8/2/2021 9/14/2021   PHQ-9 Total Score 16 19 18   Q9: Thoughts of better off dead/self-harm past 2 weeks Several days More than half the days Several days   F/U: Thoughts of suicide or self-harm Yes - Yes   F/U: Self harm-plan No - No   F/U: Self-harm action No - No   F/U: Safety concerns No - No       Recent Labs   Lab Test 06/16/21  0741 03/21/21  0808   CR 1.09 0.92   GFRESTIMATED 77 >90     Recent Labs   Lab Test 06/16/21  0741 03/21/21  0808   AST 23 20   ALT 33 34   ALKPHOS 88 100     Recent Labs   Lab Test 06/16/21  0741 03/21/21  0808   GLC 92 95     No lab results found.  Recent Labs   Lab Test 06/16/21  0741 03/21/21  0808   AST 23 20   ALT 33 34   ALKPHOS 88 100     Recent Labs   Lab Test 06/16/21  0741 03/21/21  0808 03/17/21  0748   WBC 6.1 6.8 8.3   ANEU  --  3.7 4.4   HGB 14.6 15.6 15.0    246 211     PSYCHOTROPIC DRUG INTERACTIONS   Increased risk of QTc prolongation: fluoxetine, quetiapine, Suboxone  Increased risk of serotonin syndrome: fluoxetine, Suboxone  Increased risk of CNS/respiratory depression: Suboxone, quetiapine    MANAGEMENT:  Monitoring for adverse effects and patient is aware of risks    RISK STATEMENT for SAFETY "     Today, Jamison Breen does not report any suicidal ideation and does not appear to be an imminent risk to self or others.    SUICIDE RISK ASSESSMENT-  Risk factors for self-harm: previous suicide attempt, recent psych inpt , financial/legal stress, significant pain and takes opiates.  Mitigating factors: h/o seeking help , commitment to family and participation in DBT or day program.  The patient does not appear to be at imminent risk for self-harm, hospitalization is recommended which the pt does not agree to. No hospitalization will be arranged. Safety plan reviewed today including contacting family, friends (), day treatment team, clinic, or crisis line.    TREATMENT RISK STATEMENT:  The risks, benefits, alternatives and potential adverse effects have been discussed and are understood by the pt. The pt understands the risks of using street drugs or alcohol. There are no medical contraindications, the pt agrees to treatment with the ability to do so. The pt knows to call the clinic for any problems or to access emergency care if needed.  Medical and substance use concerns are documented above.  Psychotropic drug interaction check was done, including changes made today.    PROVIDER: Slim Sage MD    Patient was not staffed.  Supervisor is Dr. Downing who will sign the note.

## 2021-09-14 ENCOUNTER — VIRTUAL VISIT (OUTPATIENT)
Dept: PSYCHIATRY | Facility: CLINIC | Age: 56
End: 2021-09-14
Attending: PSYCHIATRY & NEUROLOGY
Payer: COMMERCIAL

## 2021-09-14 DIAGNOSIS — F41.9 ANXIETY: ICD-10-CM

## 2021-09-14 DIAGNOSIS — F32.A DEPRESSION, UNSPECIFIED DEPRESSION TYPE: ICD-10-CM

## 2021-09-14 PROCEDURE — 99214 OFFICE O/P EST MOD 30 MIN: CPT | Mod: 95 | Performed by: STUDENT IN AN ORGANIZED HEALTH CARE EDUCATION/TRAINING PROGRAM

## 2021-09-14 RX ORDER — LIOTHYRONINE SODIUM 25 UG/1
25 TABLET ORAL DAILY
Qty: 30 TABLET | Refills: 1 | Status: SHIPPED | OUTPATIENT
Start: 2021-09-14 | End: 2021-10-19

## 2021-09-14 RX ORDER — QUETIAPINE FUMARATE 100 MG/1
150 TABLET, FILM COATED ORAL AT BEDTIME
Qty: 45 TABLET | Refills: 1 | Status: SHIPPED | OUTPATIENT
Start: 2021-09-14 | End: 2021-10-19

## 2021-09-14 RX ORDER — FLUOXETINE 40 MG/1
40 CAPSULE ORAL DAILY
Qty: 30 CAPSULE | Refills: 1 | Status: SHIPPED | OUTPATIENT
Start: 2021-09-14 | End: 2021-10-19

## 2021-09-14 ASSESSMENT — PATIENT HEALTH QUESTIONNAIRE - PHQ9
SUM OF ALL RESPONSES TO PHQ QUESTIONS 1-9: 18
SUM OF ALL RESPONSES TO PHQ QUESTIONS 1-9: 18
10. IF YOU CHECKED OFF ANY PROBLEMS, HOW DIFFICULT HAVE THESE PROBLEMS MADE IT FOR YOU TO DO YOUR WORK, TAKE CARE OF THINGS AT HOME, OR GET ALONG WITH OTHER PEOPLE: EXTREMELY DIFFICULT

## 2021-09-14 NOTE — PATIENT INSTRUCTIONS
Nice to see you today Les. As we discussed:   - Stop prazosin   - Follow-up with fasting blood draw to monitor quetiapine  -Call DBT referrals    DBT & EMDR Specialist   [facility]   [address], [address]   [place] [address]   [phone]   [email or web address]     [facility]   [address], [address]   [place] [address]   [phone]   [email or web address]     [facility]   [address], [address]   [place] [address]   [phone]   [email or web address]       **For crisis resources, please see the information at the end of this document**     Patient Education      Thank you for coming to the [facility] [place] [facility].    Lab Testing:  If you had lab testing today and your results are reassuring or normal they will be mailed to you or sent through Automated Insights within 7 days. If the lab tests need quick action we will call you with the results. The phone number we will call with results is # [phone] (home) . If this is not the best number please call our clinic and change the number.    Medication Refills:  If you need any refills please call your pharmacy and they will contact us. Our fax number for refills is [phone]. Please allow three business for refill processing. If you need to  your refill at a new pharmacy, please contact the new pharmacy directly. The new pharmacy will help you get your medications transferred.     Scheduling:  If you have any concerns about today's visit or wish to schedule another appointment please call our office during normal business hours [phone] (8-5:00 M-F)    Contact Us:  Please call [phone] during business hours (8-5:00 M-F).  If after clinic hours, or on the weekend, please call  [phone].    Financial Assistance [phone]  [facility] Billing [phone]  Central Billing  Office, [facility]: [phone]  [facility] Billing [phone]  Medical Records [phone]  [facility] Patient Bill of Rights [email or web address]       MENTAL HEALTH CRISIS NUMBERS:  For a medical emergency please call  911 or go to the nearest ER.     [facility]:   [facility] -[phone]   Crisis Residence [facility] Residence -[phone]   Walk-In Counseling Center [facility] -[phone]   COPE 24/7 [place] Mobile Team -[phone] (adults)/[phone] (child)  CHILD: [facility] needs assessment team - [phone]      [facility]:   [facility] - [phone]   Walk-in counseling [facility] - [phone]   Walk-in counseling [facility] - [phone]   Crisis Residence [facility] Residence - [phone]  Urgent Care Adult Mental Pcjquc-[phone] mobile unit/ 24/7 crisis line    National Crisis Numbers:   National Suicide Prevention Lifeline: 1-601-515-TALK (311-921-8755)  Poison Control Center - 4-465-029-5246  [employer]/resources for a list of additional resources (SOS)  Trans Lifeline a hotline for transgender people 0-696-805-3977  The Tu Project a hotline for LGBT youth 9-779-771-3678  Crisis Text Line: For any crisis 24/7   To: 836114  see www.crisistextline.org  - IF MAKING A CALL FEELS TOO HARD, send a text!         Again thank you for choosing [facility] and please let us know how we can best partner with you to improve you and your family's health.    You may be receiving a survey regarding this appointment. We would love to have your feedback, both positive and negative. The survey is done by an external company, so your answers are anonymous.

## 2021-09-14 NOTE — TELEPHONE ENCOUNTER
Hi Les-     I recommend weaning off of the Suboxone. You can reduce by 0.5 film per day every 7 days. (so reduce to using 0.5 films three times daily x 7 days, then reduce to 0.5 film twice daily x 7 days, then reduce to 0.5 film once per day for 7 days, then stop). This should have you off of the Suboxone about 5-7 days prior to surgery.      Thanks Les, and I wish you speedy healing after surgery.      Susana RGANT RN CNP, JAQUELINP  [facility]  [place]    I have sent the above [place] message to the patient.     Susana GRANT RN CNP, JAQUELINP  [facility]

## 2021-09-15 ASSESSMENT — PATIENT HEALTH QUESTIONNAIRE - PHQ9: SUM OF ALL RESPONSES TO PHQ QUESTIONS 1-9: 18

## 2021-09-17 DIAGNOSIS — F17.200 TOBACCO USE DISORDER: ICD-10-CM

## 2021-09-17 NOTE — TELEPHONE ENCOUNTER
BP Readings from Last 3 Encounters:   09/08/21 (!) 139/93   06/21/21 96/61   06/03/21 111/54     Marisa Deleon BSN, RN

## 2021-09-29 ENCOUNTER — VIRTUAL VISIT (OUTPATIENT)
Dept: PSYCHIATRY | Facility: CLINIC | Age: 56
End: 2021-09-29
Attending: PSYCHOLOGIST
Payer: COMMERCIAL

## 2021-09-29 DIAGNOSIS — F33.1 MAJOR DEPRESSIVE DISORDER, RECURRENT EPISODE, MODERATE (H): Primary | ICD-10-CM

## 2021-09-29 PROCEDURE — 99207 PR INCOMPL DIAG INTERV-PSYCH TEST: CPT

## 2021-09-29 NOTE — PROGRESS NOTES
Date of Assessment: Sep 29, 2021     Appointment Length: 60 minutes (start: 10, stop: 11).    Patient Name: Jailene Breen    Patient MRN: [id number]    Provider: Bibi Hawk    Type of service:  video visit for diagnostic assessment  Reason for video visit:  Patient unable to travel due to Covid-19  Originating Site (patient location):  [facility]   Location- Patient's home  Distant Site (provider location):  Remote location  Mode of Communication:  Video Conference via Zoom  Consent:  Patient has given verbal consent for video visit?: Yes     Diagnostic Impressions: Major depressive disorder, recurrent episode, moderate    The patient was seen today by Bibi Hawk MA  for part one of a diagnostic assessment. The purpose of the assessment, documentation processes, and limits to confidentiality were described to the patient. The patient indicated understanding and was given the opportunity to ask questions. Based on this initial assessment, Mr. Breen meets criteria for Major Depressive Disorder, Moderate, Recurrent.     In today's interview, the patient  provided a psychosocial history. Risk was assessed to be low. Based on the patient's presenting problems, an additional appointment is needed to complete the assessment. Another meeting is scheduled for 10/6/2021 and the evaluation will be completed at that time.    -----  I did not see this patient directly. This patient was discussed with me in individual psychotherapy supervision, and I agree with the plan as documented.    Lena Dinh, PhD,   Clinical Psychologist  Sep 29, 2021

## 2021-09-30 ENCOUNTER — HOSPITAL ENCOUNTER (OUTPATIENT)
Dept: BEHAVIORAL HEALTH | Facility: CLINIC | Age: 56
End: 2021-09-30
Attending: PSYCHIATRY & NEUROLOGY
Payer: COMMERCIAL

## 2021-09-30 PROCEDURE — 90853 GROUP PSYCHOTHERAPY: CPT | Mod: GT

## 2021-09-30 NOTE — PROGRESS NOTES
[address] [address]  Phone: [phone]  Primary Provider: Aiden Resendez  Pre-op Performing Provider: JONO HARO      PREOPERATIVE EVALUATION:  Today's date: 10/11/2021    Jailene Breen is a 55 year old male who presents for a preoperative evaluation.    Surgical Information:  Surgery/Procedure: ROBOTIC XI ASSISTED LAPAROSCOPIC HERNIA REPAIR HIATAL PARTIAL FUNDOPLICATION POSSIBLE MESH  Surgery Location: [facility]  Surgeon: NICOLE WALDROP MD  Surgery Date: 10/14/21  Time of Surgery: TBD  Where patient plans to recover: Other: [facility]   Fax number for surgical facility:     Type of Anesthesia Anticipated: General    Assessment & Plan     The proposed surgical procedure is considered INTERMEDIATE risk.    (Z01.818) Preop general physical exam  (primary encounter diagnosis)  (K44.9) Hiatal hernia  (K21.9) Gastroesophageal reflux disease without esophagitis  Comment: symptomatic HH  Plan: surgery, as above    (F32.A) Depression, unspecified depression type  Comment: on Cytomel  Plan: TSH          (Z11.4) Screening for HIV (human immunodeficiency virus)  Comment: indications for screening discussed with the patient   Plan: HIV Antigen Antibody Combo              RECOMMENDATION:  APPROVAL GIVEN to proceed with proposed procedure, without further diagnostic evaluation.        Subjective     HPI related to upcoming procedure: This 56 year old male complains of acid refulx symptoms, present for many years. GERD symptoms treated with various medications for the past ~10 years.        Preop Questions 10/11/2021   1. Have you ever had a heart attack or stroke? No   2. Have you ever had surgery on your heart or blood vessels, such as a stent placement, a coronary artery bypass, or surgery on an artery in your head, neck, heart, or legs? No   3. Do you have chest pain with activity? No   4. Do you have a  history of  heart failure? No   5. Do you currently have a cold, bronchitis or symptoms of other infection? No   6. Do you have a cough, shortness of breath, or wheezing? No   7. Do you or anyone in your family have previous history of blood clots? No   8. Do you or does anyone in your family have a serious bleeding problem such as prolonged bleeding following surgeries or cuts? No   9. Have you ever had problems with anemia or been told to take iron pills? No   10. Have you had any abnormal blood loss such as black, tarry or bloody stools? No   11. Have you ever had a blood transfusion? No   12. Are you willing to have a blood transfusion if it is medically needed before, during, or after your surgery? Yes   13. Have you or any of your relatives ever had problems with anesthesia? No   14. Do you have sleep apnea, excessive snoring or daytime drowsiness? No   15. Do you have any artifical heart valves or other implanted medical devices like a pacemaker, defibrillator, or continuous glucose monitor? No   16. Do you have artificial joints? No   17. Are you allergic to latex? No     Health Care Directive:  Patient does not have a Health Care Directive or Living Will: Discussed advance care planning with patient; information given to patient to review.    Preoperative Review of :   reviewed - controlled substances reflected in medication list.      Status of Chronic Conditions:  See problem list for active medical problems.  Problems all longstanding and stable, except as noted/documented.  See ROS for pertinent symptoms related to these conditions.      Review of Systems  Constitutional, neuro, ENT, endocrine, pulmonary, cardiac, gastrointestinal, genitourinary, integument and psychiatric systems are negative, except as otherwise noted. Increased pain while off Subaxone for surgery.    Patient Active Problem List    Diagnosis Date Noted     Major depressive disorder, recurrent episode, severe with mixed features  (H) 04/01/2021     Priority: Medium     Insomnia due to other mental disorder 03/19/2021     Priority: Medium     Suicidal ideation 03/16/2021     Priority: Medium     Immunocompromised (H) 01/13/2021     Priority: Medium     Moderate major depression (H) 10/06/2020     Priority: Medium     Tobacco use disorder 06/19/2017     Priority: Medium     Ankylosing spondylitis (H) 03/01/2017     Priority: Medium     Generalized anxiety disorder 10/31/2016     Priority: Medium     Panic attack 10/31/2016     Priority: Medium     Right-sided thoracic back pain, unspecified chronicity 10/10/2016     Priority: Medium     Tear meniscus knee, right, initial encounter 09/15/2016     Priority: Medium     Rheumatoid arthritis involving multiple sites, unspecified rheumatoid factor presence 09/15/2016     Priority: Medium     IMO Regulatory Load OCT 2020       Essential hypertension with goal blood pressure less than 140/90 11/21/2013     Priority: Medium     CARDIOVASCULAR SCREENING; LDL GOAL LESS THAN 160 03/06/2012     Priority: Medium     Obesity, Class I, BMI 30-34.9 03/06/2012     Priority: Medium     Chronic pain 09/04/2011     Priority: Medium      Past Medical History:   Diagnosis Date     Ankylosing spondylitis (H) 03/01/2017     Anxiety      Arthritis     back, knees     Back pain 11/17/2013    possible drug seeking behavior in the past.     Gastroesophageal reflux disease      Hypertension      Overweight (BMI 25.0-29.9) 03/06/2012     Panic attacks      Syncope, unspecified syncope type 02/27/2017     Past Surgical History:   Procedure Laterality Date     ARTHROSCOPY KNEE Right 09/22/2016    Procedure: ARTHROSCOPY KNEE;  Surgeon: Fredo Hughes MD;  Location: [facility] OR     COMBINED ESOPHAGOSCOPY, GASTROSCOPY, DUODENOSCOPY (EGD) WITH CO2 INSUFFLATION N/A 01/19/2021    Procedure: ESOPHAGOGASTRODUODENOSCOPY, WITH CO2 INSUFFLATION;  Surgeon: Brandon Mack MD;  Location: [facility] OR     ESOPHAGOSCOPY,  GASTROSCOPY, DUODENOSCOPY (EGD), COMBINED N/A 01/19/2021    Procedure: Esophagogastroduodenoscopy, With Biopsy;  Surgeon: Brandon Mack MD;  Location: [facility] OR     ESOPHAGOSCOPY, GASTROSCOPY, DUODENOSCOPY (EGD), COMBINED N/A 05/27/2021    Procedure: ESOPHAGOGASTRODUODENOSCOPY (EGD);  Surgeon: Chester Lynn MD;  Location: [facility]     INJECT PARAVERTEBRAL FACET JOINT LUMBAR / SACRAL FIRST Right 11/25/2016    Procedure: INJECT PARAVERTEBRAL FACET JOINT LUMBAR / SACRAL FIRST;  Surgeon: Doretha Magallanes MD;  Location: [facility]     ORTHOPEDIC SURGERY Right     knee     PLANTAR FASCIA RELEASE Left      Current Outpatient Medications   Medication Sig Dispense Refill     acetaminophen (TYLENOL) 500 MG tablet Take 1,000 mg by mouth every 6 hours as needed for mild pain (headache)       buprenorphine HCl-naloxone HCl (SUBOXONE) 8-2 MG per film Use 0.5 film under the tongue 4 times per day for pain, approximately every 6 hours. Max 2 films per day. Fill/begin 9/8/2021. 30 day supply (Patient not taking: Reported on 10/11/2021) 60 each 0     chlorzoxazone (PARAFON FORTE) 500 MG tablet Take 1 tablet (500 mg) by mouth 3 times daily as needed for muscle spasms 45 tablet 1     etanercept (ENBREL SURECLICK) 50 MG/ML autoinjector Inject 50 mg Subcutaneous once a week 1 mL 3     fish oil-omega-3 fatty acids 1000 MG capsule Take 1 g by mouth daily       FLUoxetine (PROZAC) 40 MG capsule Take 1 capsule (40 mg) by mouth daily 30 capsule 1     folic acid (FOLVITE) 1 MG tablet Take 5 tablets (5 mg) by mouth daily 150 tablet 3     liothyronine (CYTOMEL) 25 MCG tablet Take 1 tablet (25 mcg) by mouth daily 30 tablet 1     medical cannabis (Patient's own supply) See Admin Instructions (The purpose of this order is to document that the patient reports taking medical cannabis.  This is not a prescription, and is not used to certify that the patient has a qualifying medical condition.)     Pills PRN   Lozenges PRN       methotrexate sodium  "2.5 MG TABS TAKE 4 TABLETS BY MOUTH EVERY 7 DAYS 48 tablet 0     multivitamin (CENTRUM SILVER) tablet Take 1 tablet by mouth daily       nicotine polacrilex (NICORETTE) 4 MG gum PLACE 1 PIECE INSIDE CHEEK AS NEEDED FOR SMOKING CESSATION 240 each 1     omeprazole (PRILOSEC) 20 MG DR capsule Take 20 mg by mouth daily as needed        QUEtiapine (SEROQUEL) 100 MG tablet Take 1.5 tablets (150 mg) by mouth At Bedtime 45 tablet 1     QUEtiapine (SEROQUEL) 25 MG tablet Take 1-2 tablets (25-50 mg) by mouth daily as needed (for severe anxiety/panic attacks or sleep) (Patient not taking: Reported on 9/8/2021) 30 tablet 0     vitamin D2 (ERGOCALCIFEROL) 33542 units (1250 mcg) capsule Take 50,000 Units by mouth twice a week mon and thurs         Allergies   Allergen Reactions     Mirtazapine      Other reaction(s): Coma     Hydrocodone Itching     Ms Contin [Morphine] Itching     Remeron Soltab Other (See Comments)     \"Blacked out\" for one week        Social History     Tobacco Use     Smoking status: Never Smoker     Smokeless tobacco: Former User     Types: Chew     Tobacco comment: still chews nicotine gum   Substance Use Topics     Alcohol use: No     Comment: none     Family History   Problem Relation Age of Onset     Hypertension Mother      Diabetes Mother      Coronary Stenting Father 62        possible MI     Lung Cancer Paternal Uncle      Autoimmune Disease No family hx of      Anesthesia Reaction No family hx of      Thrombophilia No family hx of      Bleeding Disorder No family hx of      History   Drug Use     Frequency: 7.0 times per week     Types: Marijuana     Comment: medicinal (oral)         Objective     /88 (BP Location: Left arm, Patient Position: Sitting, Cuff Size: Adult Large)   Pulse 85   Temp 98.3  F (36.8  C) (Tympanic)   Ht 1.93 m (6' 4\")   Wt 119.7 kg (264 lb)   SpO2 97%   BMI 32.14 kg/m      Physical Exam    GENERAL APPEARANCE: healthy, alert and no distress     EYES: EOMI,  PERRL    "  HENT: ear canals and TM's normal and nose and mouth without ulcers or lesions     NECK: no adenopathy, no asymmetry, masses, or scars and thyroid normal to palpation     RESP: lungs clear to auscultation - no rales, rhonchi or wheezes     CV: regular rates and rhythm, normal S1 S2, no S3 or S4 and no murmur, click or rub     ABDOMEN:  soft, nontender, no HSM or masses and bowel sounds normal     MS: extremities normal- no gross deformities noted, no evidence of inflammation in joints, FROM in all extremities.     SKIN: no suspicious lesions or rashes     NEURO: Normal strength and tone, sensory exam grossly normal, mentation intact and speech normal     PSYCH: mentation appears normal. and affect normal/bright     LYMPHATICS: No cervical adenopathy    Recent Labs   Lab Test 06/16/21  0741 03/21/21  0808   HGB 14.6 15.6    246    141   POTASSIUM 4.2 4.0   CR 1.09 0.92        Diagnostics:  Labs pending at this time.  Results will be reviewed when available.   No EKG required, no history of coronary heart disease, significant arrhythmia, peripheral arterial disease or other structural heart disease.    Revised Cardiac Risk Index (RCRI):  The patient has the following serious cardiovascular risks for perioperative complications:   - No serious cardiac risks = 0 points     RCRI Interpretation: 0 points: Class I (very low risk - 0.4% complication rate)       Signed Electronically by: Aguilar Blum MD  Copy of this evaluation report is provided to requesting physician.    Current pre-op stands for 11/12/21 surgery.  Renata Bergman M.D.

## 2021-09-30 NOTE — GROUP NOTE
Process Group Note    PATIENT'S NAME: Jailene Breen  MRN:   [id number]  :   1965  ACCT. NUMBER: [id number]  DATE OF SERVICE: 21  START TIME:  9:00 AM  END TIME: 11:00 AM  FACILITATOR: Diana Ulrich LMFT  TOPIC:  Process Group    Diagnoses:  296.32 Major Depressive Disorder, recurrent, moderate  300.022 JEFF  300.01  Panic Disorder     [facility]  TRACK: Clinic 2    NUMBER OF PARTICIPANTS: 7                                      Service Modality:  Video Visit     Telemedicine Visit: The patient's condition can be safely assessed and treated via synchronous audio and visual telemedicine encounter.      Reason for Telemedicine Visit: Services only offered telehealth    Originating Site (Patient Location): Patient's home    Distant Site (Provider Location): Provider Remote Setting- Home Office    Consent:  The patient/guardian has verbally consented to: the potential risks and benefits of telemedicine (video visit) versus in person care; bill my insurance or make self-payment for services provided; and responsibility for payment of non-covered services.     Patient would like the video invitation sent by:  My Chart    Mode of Communication:  Video Conference via Medical Zoom    As the provider I attest to compliance with applicable laws and regulations related to telemedicine.                Data:    Session content: At the start of this group, patients were invited to check in by identifying themselves, describing their current emotional status, and identifying issues to address in this group.   Area(s) of treatment focus addressed in this session included Symptom Management, Personal Safety, Community Resources/Discharge Planning, Develop / Improve Independent Living Skills and Develop Socialization / Interpersonal Relationship Skills.    Les reported  things have been really tough lately,  especially with sleep schedule and having  a lot on my mind.  Jamison  reports missing last week due to oversleeping. Reports having a disability hearing on 10/5/21, and surgery coming up after that after that. Reports concern about having to drop out of school because of falling behind. Reports lack of motivation impacting ability to get out of bed, read, and do schoolwork. Denies safety concerns, denies chemical use, and reports taking their medications as prescribed. Discussed radical acceptance for autoimmune disease. Les reported starting CBT once a week. The group offered support, encouragement, and advice on work opportunities.     Therapeutic Interventions/Treatment Strategies:  Psychotherapist offered support, feedback and validation, provided redirection and reinforced use of skills. Treatment modalities used include Motivational Interviewing, Cognitive Behavioral Therapy and Dialectical Behavioral Therapy. Interventions include Behavioral Activation: Encouraged strategies to reduce individual procrastination and increase motivation by increasing goal-directed activities to enhance mood and reduce symptoms..    Assessment:    Patient response:   Patient responded to session by accepting feedback, listening, focusing on goals, being attentive and accepting support    Possible barriers to participation / learning include: and no barriers identified    Health Issues:   Yes: Chronic disease management, Associated Psychological Distress       Substance Use Review:   Substance Use: No active concerns identified.    Mental Status/Behavioral Observations  Appearance:   Appropriate   Eye Contact:   Good   Psychomotor Behavior: Normal   Attitude:   Cooperative   Orientation:   All  Speech   Rate / Production: Normal    Volume:  Normal   Mood:    Normal  Affect:    Appropriate   Thought Content:   Clear and Rumination  Thought Form:  Coherent  Logical     Insight:    Good  and Fair     Plan:     Safety Plan: Committed to safety and agreed to follow previously developed safety coping  plan.     No current safety concerns identified.  Recommended that patient call 911 or go to the local ED should there be a change in any of these risk factors.     Barriers to treatment: None identified    Patient Contracts (see media tab):  None    Substance Use: Not addressed in session     Continue or Discharge: Patient will continue in Adult Day Treatment (ADT)  as planned. Patient is likely to benefit from learning and using skills as they work toward the goals identified in their treatment plan.      ROSEMARY Doan  September 30, 2021

## 2021-10-06 ENCOUNTER — VIRTUAL VISIT (OUTPATIENT)
Dept: PSYCHIATRY | Facility: CLINIC | Age: 56
End: 2021-10-06
Attending: PSYCHOLOGIST
Payer: COMMERCIAL

## 2021-10-06 DIAGNOSIS — F33.1 MAJOR DEPRESSIVE DISORDER, RECURRENT EPISODE, MODERATE (H): Primary | ICD-10-CM

## 2021-10-06 PROCEDURE — 90791 PSYCH DIAGNOSTIC EVALUATION: CPT | Mod: 95

## 2021-10-06 NOTE — TELEPHONE ENCOUNTER
A Rx for guaiFENesin-codeine (ROBITUSSIN AC) 100-10 MG/5ML SOLN solution was faxed to [facility] in [place]. Patient notified.  
Pharmacy pended.   Saw Anthony on 2/20/18  
98

## 2021-10-07 ENCOUNTER — HOSPITAL ENCOUNTER (OUTPATIENT)
Dept: BEHAVIORAL HEALTH | Facility: CLINIC | Age: 56
End: 2021-10-07
Attending: PSYCHIATRY & NEUROLOGY
Payer: COMMERCIAL

## 2021-10-07 PROCEDURE — 90853 GROUP PSYCHOTHERAPY: CPT | Mod: 95

## 2021-10-07 NOTE — GROUP NOTE
Process Group Note    PATIENT'S NAME: Jailene Breen  MRN:   [id number]  :   1965  ACCT. NUMBER: [id number]  DATE OF SERVICE: 10/07/21  START TIME:  9:00 AM  END TIME:  9:50 AM  FACILITATOR: Venecia Quesada  TOPIC: MH Process Group    Diagnoses:  296.32 Major Depressive Disorder, recurrent, moderate  300.022 JEFF  300.01  Panic Disorder       [facility] Day Treatment  TRACK: Clinic 2                                      Service Modality:  Video Visit     Telemedicine Visit: The patient's condition can be safely assessed and treated via synchronous audio and visual telemedicine encounter.      Reason for Telemedicine Visit:  covid 19     Originating Site (Patient Location): Patient's home    Distant Site (Provider Location): Provider Remote Setting- Home Office    Consent:  The patient/guardian has verbally consented to: the potential risks and benefits of telemedicine (video visit) versus in person care; bill my insurance or make self-payment for services provided; and responsibility for payment of non-covered services.     Patient would like the video invitation sent by:  My Chart    Mode of Communication:  Video Conference via Medical Zoom    As the provider I attest to compliance with applicable laws and regulations related to telemedicine.          NUMBER OF PARTICIPANTS: 7        Data:    Session content: At the start of this group, patients were invited to check in by identifying themselves, describing their current emotional status, and identifying issues to address in this group.   Area(s) of treatment focus addressed in this session included Symptom Management, Personal Safety, Develop / Improve Independent Living Skills and Develop Socialization / Interpersonal Relationship Skills.  Jamison reported having a busy week and weekend.  He reported court for disability went well and he feels hopeful.  He reported feeling he has won the lottery and will be set up for life if he  is able to get this disability.  He reported feeling annoyed about getting self a birthday present and having it be wrong then having to problem solve.  He reported plans to miss group next week for surgery.  He reported dropping out of classes as he felt too far behind.  He reported doing assessment and being told DBT would not be a good fit as he did not experience mood swings and assessors think CBT would be a better fit.     Therapeutic Interventions/Treatment Strategies:  Psychotherapist offered support, feedback and validation and reinforced use of skills. Treatment modalities used include Motivational Interviewing, Cognitive Behavioral Therapy and Dialectical Behavioral Therapy.  Validated and normalized.  Highlighted and reinforced skills used; connected to positive outcomes.  Provided additional skill suggestions.  Aided in creating a plan to help work towards goals.        Assessment:    Patient response:   Patient responded to session by accepting feedback, giving feedback, listening, focusing on goals, being attentive and accepting support    Possible barriers to participation / learning include: and no barriers identified    Health Issues:   None reported       Substance Use Review:   Substance Use: No active concerns identified.    Mental Status/Behavioral Observations  Appearance:   Appropriate   Eye Contact:   Good   Psychomotor Behavior: Normal   Attitude:   Cooperative   Orientation:   All  Speech   Rate / Production: Normal    Volume:  Normal   Mood:    Anxious  Depressed   Affect:    Appropriate   Thought Content:   Rumination  Thought Form:  Coherent  Logical     Insight:    Good     Plan:     Safety Plan: No current safety concerns identified.  Recommended that patient call 911 or go to the local ED should there be a change in any of these risk factors.     Barriers to treatment: None identified    Patient Contracts (see media tab):  None    Substance Use: Not addressed in session     Continue  or Discharge: Patient will continue in Adult Day Treatment (ADT)  as planned. Patient is likely to benefit from learning and using skills as they work toward the goals identified in their treatment plan.      Venecia Quesada  October 7, 2021

## 2021-10-11 ENCOUNTER — OFFICE VISIT (OUTPATIENT)
Dept: FAMILY MEDICINE | Facility: CLINIC | Age: 56
End: 2021-10-11
Payer: COMMERCIAL

## 2021-10-11 ENCOUNTER — LAB (OUTPATIENT)
Dept: URGENT CARE | Facility: URGENT CARE | Age: 56
End: 2021-10-11
Payer: COMMERCIAL

## 2021-10-11 VITALS
TEMPERATURE: 98.3 F | OXYGEN SATURATION: 97 % | DIASTOLIC BLOOD PRESSURE: 88 MMHG | HEART RATE: 85 BPM | BODY MASS INDEX: 32.15 KG/M2 | HEIGHT: 76 IN | WEIGHT: 264 LBS | SYSTOLIC BLOOD PRESSURE: 134 MMHG

## 2021-10-11 DIAGNOSIS — Z01.818 PREOP GENERAL PHYSICAL EXAM: Primary | ICD-10-CM

## 2021-10-11 DIAGNOSIS — Z79.899 ENCOUNTER FOR LONG-TERM CURRENT USE OF MEDICATION: ICD-10-CM

## 2021-10-11 DIAGNOSIS — Z11.52 ENCOUNTER FOR PREOPERATIVE SCREENING LABORATORY TESTING FOR COVID-19 VIRUS: ICD-10-CM

## 2021-10-11 DIAGNOSIS — K44.9 HIATAL HERNIA: ICD-10-CM

## 2021-10-11 DIAGNOSIS — M06.9 RHEUMATOID ARTHRITIS INVOLVING MULTIPLE SITES, UNSPECIFIED WHETHER RHEUMATOID FACTOR PRESENT (H): ICD-10-CM

## 2021-10-11 DIAGNOSIS — Z11.52 ENCOUNTER FOR PREOPERATIVE SCREENING LABORATORY TESTING FOR COVID-19 VIRUS: Primary | ICD-10-CM

## 2021-10-11 DIAGNOSIS — Z11.4 SCREENING FOR HIV (HUMAN IMMUNODEFICIENCY VIRUS): ICD-10-CM

## 2021-10-11 DIAGNOSIS — Z01.812 ENCOUNTER FOR PREOPERATIVE SCREENING LABORATORY TESTING FOR COVID-19 VIRUS: Primary | ICD-10-CM

## 2021-10-11 DIAGNOSIS — K21.9 GASTROESOPHAGEAL REFLUX DISEASE WITHOUT ESOPHAGITIS: ICD-10-CM

## 2021-10-11 DIAGNOSIS — F32.A DEPRESSION, UNSPECIFIED DEPRESSION TYPE: ICD-10-CM

## 2021-10-11 DIAGNOSIS — Z01.812 ENCOUNTER FOR PREOPERATIVE SCREENING LABORATORY TESTING FOR COVID-19 VIRUS: ICD-10-CM

## 2021-10-11 PROBLEM — R45.851 SUICIDE IDEATION: Status: RESOLVED | Noted: 2021-03-21 | Resolved: 2021-10-11

## 2021-10-11 LAB
ALBUMIN SERPL-MCNC: 3.9 G/DL (ref 3.4–5)
ALP SERPL-CCNC: 102 U/L (ref 40–150)
ALT SERPL W P-5'-P-CCNC: 47 U/L (ref 0–70)
ANION GAP SERPL CALCULATED.3IONS-SCNC: 4 MMOL/L (ref 3–14)
AST SERPL W P-5'-P-CCNC: 37 U/L (ref 0–45)
BASOPHILS # BLD AUTO: 0 10E3/UL (ref 0–0.2)
BASOPHILS NFR BLD AUTO: 1 %
BILIRUB SERPL-MCNC: 0.7 MG/DL (ref 0.2–1.3)
BUN SERPL-MCNC: 13 MG/DL (ref 7–30)
CALCIUM SERPL-MCNC: 8.9 MG/DL (ref 8.5–10.1)
CHLORIDE BLD-SCNC: 107 MMOL/L (ref 94–109)
CHOLEST SERPL-MCNC: 209 MG/DL
CO2 SERPL-SCNC: 28 MMOL/L (ref 20–32)
CREAT SERPL-MCNC: 0.91 MG/DL (ref 0.66–1.25)
CRP SERPL-MCNC: <2.9 MG/L (ref 0–8)
EOSINOPHIL # BLD AUTO: 0.3 10E3/UL (ref 0–0.7)
EOSINOPHIL NFR BLD AUTO: 4 %
ERYTHROCYTE [DISTWIDTH] IN BLOOD BY AUTOMATED COUNT: 13.3 % (ref 10–15)
ERYTHROCYTE [SEDIMENTATION RATE] IN BLOOD BY WESTERGREN METHOD: 10 MM/HR (ref 0–20)
FASTING STATUS PATIENT QL REPORTED: YES
GFR SERPL CREATININE-BSD FRML MDRD: >90 ML/MIN/1.73M2
GLUCOSE BLD-MCNC: 97 MG/DL (ref 70–99)
HBA1C MFR BLD: 5.2 % (ref 0–5.6)
HCT VFR BLD AUTO: 46.1 % (ref 40–53)
HDLC SERPL-MCNC: 30 MG/DL
HGB BLD-MCNC: 16.2 G/DL (ref 13.3–17.7)
IMM GRANULOCYTES # BLD: 0 10E3/UL
IMM GRANULOCYTES NFR BLD: 0 %
LDLC SERPL CALC-MCNC: 134 MG/DL
LYMPHOCYTES # BLD AUTO: 1.6 10E3/UL (ref 0.8–5.3)
LYMPHOCYTES NFR BLD AUTO: 23 %
MCH RBC QN AUTO: 29.9 PG (ref 26.5–33)
MCHC RBC AUTO-ENTMCNC: 35.1 G/DL (ref 31.5–36.5)
MCV RBC AUTO: 85 FL (ref 78–100)
MONOCYTES # BLD AUTO: 0.7 10E3/UL (ref 0–1.3)
MONOCYTES NFR BLD AUTO: 10 %
NEUTROPHILS # BLD AUTO: 4.4 10E3/UL (ref 1.6–8.3)
NEUTROPHILS NFR BLD AUTO: 62 %
NONHDLC SERPL-MCNC: 179 MG/DL
PLATELET # BLD AUTO: 232 10E3/UL (ref 150–450)
POTASSIUM BLD-SCNC: 4.3 MMOL/L (ref 3.4–5.3)
PROT SERPL-MCNC: 8.2 G/DL (ref 6.8–8.8)
RBC # BLD AUTO: 5.41 10E6/UL (ref 4.4–5.9)
SODIUM SERPL-SCNC: 139 MMOL/L (ref 133–144)
TRIGL SERPL-MCNC: 225 MG/DL
TSH SERPL DL<=0.005 MIU/L-ACNC: 1.37 MU/L (ref 0.4–4)
WBC # BLD AUTO: 7 10E3/UL (ref 4–11)

## 2021-10-11 PROCEDURE — 36415 COLL VENOUS BLD VENIPUNCTURE: CPT | Performed by: FAMILY MEDICINE

## 2021-10-11 PROCEDURE — 80050 GENERAL HEALTH PANEL: CPT | Performed by: FAMILY MEDICINE

## 2021-10-11 PROCEDURE — 86140 C-REACTIVE PROTEIN: CPT | Performed by: FAMILY MEDICINE

## 2021-10-11 PROCEDURE — 87389 HIV-1 AG W/HIV-1&-2 AB AG IA: CPT | Performed by: FAMILY MEDICINE

## 2021-10-11 PROCEDURE — U0005 INFEC AGEN DETEC AMPLI PROBE: HCPCS

## 2021-10-11 PROCEDURE — 80061 LIPID PANEL: CPT | Performed by: FAMILY MEDICINE

## 2021-10-11 PROCEDURE — 85652 RBC SED RATE AUTOMATED: CPT | Performed by: FAMILY MEDICINE

## 2021-10-11 PROCEDURE — 83036 HEMOGLOBIN GLYCOSYLATED A1C: CPT | Performed by: FAMILY MEDICINE

## 2021-10-11 PROCEDURE — 99214 OFFICE O/P EST MOD 30 MIN: CPT | Performed by: FAMILY MEDICINE

## 2021-10-11 PROCEDURE — U0003 INFECTIOUS AGENT DETECTION BY NUCLEIC ACID (DNA OR RNA); SEVERE ACUTE RESPIRATORY SYNDROME CORONAVIRUS 2 (SARS-COV-2) (CORONAVIRUS DISEASE [COVID-19]), AMPLIFIED PROBE TECHNIQUE, MAKING USE OF HIGH THROUGHPUT TECHNOLOGIES AS DESCRIBED BY CMS-2020-01-R: HCPCS

## 2021-10-11 ASSESSMENT — MIFFLIN-ST. JEOR: SCORE: 2139

## 2021-10-12 ENCOUNTER — MYC MEDICAL ADVICE (OUTPATIENT)
Dept: PALLIATIVE MEDICINE | Facility: CLINIC | Age: 56
End: 2021-10-12

## 2021-10-12 LAB
HIV 1+2 AB+HIV1 P24 AG SERPL QL IA: NONREACTIVE
SARS-COV-2 RNA RESP QL NAA+PROBE: NEGATIVE

## 2021-10-12 NOTE — TELEPHONE ENCOUNTER
Per patient myChart message:  From: Jamison Breen      Created: 10/12/2021 1:19 PM      I can't get in till Oct 29th. That's to long to wait for help.        Routed to provider.     Cha RN-BSN  [facility]

## 2021-10-13 ENCOUNTER — TELEPHONE (OUTPATIENT)
Dept: FAMILY MEDICINE | Facility: CLINIC | Age: 56
End: 2021-10-13
Payer: COMMERCIAL

## 2021-10-13 NOTE — TELEPHONE ENCOUNTER
Hi Les-     I am sorry to hear that your surgery was postponed. I don't recommend starting short-acting prior to surgery. I could slowly get you back on your Suboxone dosing, and then you can decide if you are going to taper off of it again prior to surgery or if you prefer to continue the Suboxone and have your surgeon medicate you over the Suboxone.    I do have an opening on 10/26 at 9:30 AM. I have not seen you since April 2021, so I do need to see you sometime this month regardless of what decision is made re: restarting the Suboxone.  Please call the clinic ASAP to get the opening on 10/26, not sure how long that will last.      Thanks.  Susana GRANT RN CNP, XUAN  [facility]  [facility]    I have sent the above [employer] message to the patient.     Susana GRANT RN CNP, XUAN  [facility]  [facility]

## 2021-10-13 NOTE — TELEPHONE ENCOUNTER
Surgery 10-14-21 cancelled due to bed shortage at [facility]. Rescheduled to 11-12-21 @ [facility]

## 2021-10-13 NOTE — TELEPHONE ENCOUNTER
Reason for Call:  Other call back, poss appt    Detailed comments:  Patient's 10-14-21 surgery was postponed to 11-12-21 due to a bed shortage at [facility] on 10-14-21. Patient's pre-op was done with you on 10-11-21. Due to the 30 day timing for pre-op to surgery, patient would like to know if you need to see him again or if you can sign off on the 10-11-21 pre-op notes for the 11-12-21 surgery at [facility]. Please call patient to advise. Thank you    Phone Number Patient can be reached at: Cell number on file:    Telephone Information:   Mobile [phone]       Best Time: any    Can we leave a detailed message on this number? YES    Call taken on 10/13/2021 at 8:09 AM by Angie Dang

## 2021-10-14 NOTE — TELEPHONE ENCOUNTER
Per patient myChart message:  From: Jamison Breen      Created: 10/13/2021 10:47 AM      I'll take that, I thought we scheduled after last time like I usually do      _________________________________    Mychart sent to pt:  From: Cha Molina RN      Created: 10/14/2021 9:26 AM      Charo Boyle the 10/26 @ 0930 appointment is no longer available. She had wanted you to call our appointment line right away to take the slot.  We are unable to schedule appointments via [employer].       SANDRA Eubanks-BSN  [facility]

## 2021-10-14 NOTE — TELEPHONE ENCOUNTER
Per patient myChart message:  From: Jamison Breen      Created: 10/14/2021 9:57 AM      OK? Didn't know that. Well I still have the 29th which really isn't much different.

## 2021-10-14 NOTE — TELEPHONE ENCOUNTER
Left VM for patient to schedule a right lumbar facet joint steroid injection at L3-4, L4-5        Yasemin MILLAN    [facility]     Tetracycline Counseling: Patient counseled regarding possible photosensitivity and increased risk for sunburn.  Patient instructed to avoid sunlight, if possible.  When exposed to sunlight, patients should wear protective clothing, sunglasses, and sunscreen.  The patient was instructed to call the office immediately if the following severe adverse effects occur:  hearing changes, easy bruising/bleeding, severe headache, or vision changes.  The patient verbalized understanding of the proper use and possible adverse effects of tetracycline.  All of the patient's questions and concerns were addressed. Patient understands to avoid pregnancy while on therapy due to potential birth defects.

## 2021-10-15 ENCOUNTER — TELEPHONE (OUTPATIENT)
Dept: PALLIATIVE MEDICINE | Facility: CLINIC | Age: 56
End: 2021-10-15

## 2021-10-15 NOTE — TELEPHONE ENCOUNTER
Please call patient and offer him an appointment on Wednesday 10/20 in the morning. (he is currently scheduled with me on 10/29 but I can move his appointment up).    Thanks.    Susana GRANT RN CNP, FNP  [facility]

## 2021-10-18 NOTE — PROGRESS NOTES
Video- Visit Details  Type of service:  video visit for medication management  Time of service:    Date:  10/19/2021    Video Start Time:  8:59 AM        Video End Time: 9:30 AM    Reason for video visit:  Patient unable to travel due to Covid-19  Originating Site (patient location):  [facility]   Location- Patient's home  Distant Site (provider location):  Remote location  Mode of Communication:  Video Conference via AmWell  Consent:  Patient has given verbal consent for video visit?: Yes        [facility]  Psychiatry Clinic  PSYCHIATRY PROGRESS NOTE     CARE TEAM:    PCP- Aiden Resendez   Rheumatology- Raghu Parada MD  [facility]- Susana Pike APRN; Santa Farrell PsyD    Jailene Breen is a 56 year old patient who prefers the name Les and uses pronouns he, him, his.   PERTINENT BACKGROUND                                 [most recent eval 09/21/20]   Psych pertinent item history includes suicidal ideation, SIB [cutting], mutiple psychotropic trials , severe med reaction, psych hosp (<3), SUBSTANCE USE: alcohol, substance use treatment  and Major Medical Problems (Rheumatoid Arthritis and Ankylosing Spondylitis)    DIAGNOSES                       Major Depressive Disorder, recurrent, severe, without psychotic features  Generalized Anxiety Disorder, with panic    Chronic Pain  Ankylosing Spondylitis    ASSESSMENT                       Today, reports interim stability of depression and resolution of suicidal ideation. Awaiting results from disability court hearing, is hopeful about outcome. DBT evaluation completed and recommended follow-up with CBT as did not feel like he was an appropriate fit; has not yet followed up to find individual therapist, recommended follow up with psychologytoday to find a local therapist. Did not desire any medication changes today, which is appropriate given his symptom stability. Reviewed lab testing and  "recommended PCP follow-up given elevated lipids. No safety concerns. Refills provided.     [place]NPMP was checked today:  Indicates no medication misuse .    PLAN                                                                                   1) Medications:  -fluoxetine 40mg daily   -quetiapine 150mg at bedtime  -quetiapine 25-50mg as needed for anxiety and panic  -liothyronine 25mcg daily    2) Therapy- Participating in group therapy; working on setting up individual and DBT    3) Next Due   Labs- AP monitoring labs due 9/22  EKG- as needed  Rating scales- serial PHQ9s    4) Referrals  Therapy- CBT or ACT recommended    5) RTC: 6-8 weeks    6) Crisis Numbers:   Provided routinely in AVS     After hours:  [phone]    INTERIM HISTORY        The patient reports good treatment adherence.  History was provided by the patient who was a good historian.     Since the last visit:   -Stopped prazosin (patient not taking)   -Completed DBT intake, did not feel patient was appropriate at this time. Recommended follow-up with individual CBT.  -Waiting to hear back from court case about disability. \" said she felt like it was going to go my way.\"  -Has \"gotten a little bit excited about it\" but \"don't want to get to excited.\" Believes \"it could change a lot for the better.\"  -Knows \"that will be a big blow\" if does not get disability. Knows that \"I'd have to get the appeal going.\"  -If had \"changes in mood would have to let people know.\" Would let wife know. Would let clinic know.  -Would like to get started with individual therapist, has not yet.   -\"Right now feels like all my emotions are on hold. Can't really get sad about things or excited about things.\"  -Has been \"trying to get out more.\" Walking a couple times a week. \"Feel happier inside.\" Does feel more pain but \"can block it out for a little bit.\"  -\"Love this time of year.\"  -No suicidal thoughts.  -Does not want to make any medication changes " "today.    RECENT PSYCH ROS:   Depression: low energy and indecisiveness   Elevated:  none  Psychosis:  none  Anxiety:  as above  Trauma Related:  none  Dysregulation:  none  Eating Disorder: no  Other: no    Adverse Effects:  denies  Pertinent Negative Symptoms: No violent ideation, aggression, psychosis, hallucinations, delusions, otto and hypomania    Recent Substance Use:     Alcohol- none since , used to attend AA meetings prior to COVID.  Tobacco- denies  Caffeine- yes  Cannabis- denies     Opioids- as prescribed           Narcan Kit- prescribed   Other illicit drugs- none    FAMILY and SOCIAL HISTORY                                      [per patient report]            Family Hx:  Mom - depression (since  when sister )    Social Hx:  Financial/ Work- currently attending college at [employer], is hoping to start school at [employer] for a degree in psychology after being unable to work due to diagnosis of rheumatoid arthritis and ankylosing spondylitis  Partner/ -  in .  Children- 25 and 27 year old kids      Living situation- [place], house with wife and 2 kids      Social/ Spiritual Support- Talks to  every two weeks for lunch, but \"doesn't really have friends\". Family is supportive.      PSYCH and SUBSTANCE USE Critical Summary Points since  - started escitalopram  November - increased escitalopram 20mg daily  February - stopped hydroxyzine; started trazodone 50mg at bedtime and 25mg daily as needed for anxiety   - Patient hospitalized started on Abilify, titrated to 5mg daily; escitalopram switched to fluoxetine 40mg daily; started prazosin 1mg at bedtime. Pain management switched oxycodone to Suboxone and started medical marijuana.  May - patient still taking hydroxyzine, discontinued today.   - patient hospitalized (6/15/21) discontinued Abilify and trazodone, started on quetiapine & titrated to 100mg at bedtime and 25mg as " needed for sleep.   August - increase quetiapine 150mg     MEDICAL HISTORY  and ALLERGY     ALLERGIES: Mirtazapine, Hydrocodone, Ms contin [morphine], and Remeron soltab     Patient Active Problem List   Diagnosis     CARDIOVASCULAR SCREENING; LDL GOAL LESS THAN 160     Obesity, Class I, BMI 30-34.9     Tear meniscus knee, right, initial encounter     Rheumatoid arthritis involving multiple sites, unspecified rheumatoid factor presence     Chronic pain     Right-sided thoracic back pain, unspecified chronicity     Generalized anxiety disorder     Panic attack     Ankylosing spondylitis (H)     Tobacco use disorder     Moderate major depression (H)     Immunocompromised (H)     Essential hypertension with goal blood pressure less than 140/90     Suicidal ideation     Insomnia due to other mental disorder     Major depressive disorder, recurrent episode, severe with mixed features (H)         MEDICAL REVIEW OF SYSTEMS     Contraception- none  A comprehensive review of systems was performed and is negative other than noted in the HPI.    MEDICATIONS          Current Outpatient Medications   Medication Sig Dispense Refill     acetaminophen (TYLENOL) 500 MG tablet Take 1,000 mg by mouth every 6 hours as needed for mild pain (headache)       chlorzoxazone (PARAFON FORTE) 500 MG tablet Take 1 tablet (500 mg) by mouth 3 times daily as needed for muscle spasms 45 tablet 1     etanercept (ENBREL SURECLICK) 50 MG/ML autoinjector Inject 50 mg Subcutaneous once a week 1 mL 3     fish oil-omega-3 fatty acids 1000 MG capsule Take 1 g by mouth daily       FLUoxetine (PROZAC) 40 MG capsule Take 1 capsule (40 mg) by mouth daily 30 capsule 1     folic acid (FOLVITE) 1 MG tablet Take 5 tablets (5 mg) by mouth daily 150 tablet 3     liothyronine (CYTOMEL) 25 MCG tablet Take 1 tablet (25 mcg) by mouth daily 30 tablet 1     medical cannabis (Patient's own supply) See Admin Instructions (The purpose of this order is to document that the  "patient reports taking medical cannabis.  This is not a prescription, and is not used to certify that the patient has a qualifying medical condition.)     Pills PRN   Lozenges PRN       methotrexate sodium 2.5 MG TABS TAKE 4 TABLETS BY MOUTH ONCE A WEEK 48 tablet 0     multivitamin (CENTRUM SILVER) tablet Take 1 tablet by mouth daily       nicotine polacrilex (NICORETTE) 4 MG gum PLACE 1 PIECE INSIDE CHEEK AS NEEDED FOR SMOKING CESSATION 240 each 1     omeprazole (PRILOSEC) 20 MG DR capsule Take 20 mg by mouth daily as needed        QUEtiapine (SEROQUEL) 100 MG tablet Take 1.5 tablets (150 mg) by mouth At Bedtime 45 tablet 1     vitamin D2 (ERGOCALCIFEROL) 61491 units (1250 mcg) capsule Take 50,000 Units by mouth twice a week mon and thurs       buprenorphine HCl-naloxone HCl (SUBOXONE) 8-2 MG per film Use 0.5 film under the tongue 4 times per day for pain, approximately every 6 hours. Max 2 films per day. Fill/begin 9/8/2021. 30 day supply (Patient not taking: Reported on 10/11/2021) 60 each 0     QUEtiapine (SEROQUEL) 25 MG tablet Take 1-2 tablets (25-50 mg) by mouth daily as needed (for severe anxiety/panic attacks or sleep) (Patient not taking: Reported on 9/8/2021) 30 tablet 0     VITALS                     There were no vitals taken for this visit.   MENTAL STATUS EXAM         Alertness: alert  and oriented  Appearance: adequately groomed  Behavior/Demeanor: cooperative, with good  eye contact   Speech: regular rate and rhythm  Language: intact  Psychomotor: normal or unremarkable  Mood: \"okay\"   Affect: appropriate; congruent to: mood- yes, content- yes  Thought Process/Associations: unremarkable  Thought Content:  Reports none;  Denies suicidal ideation, violent ideation and delusions   Perception:  Reports none;  Denies auditory hallucinations and visual hallucinations  Insight: good  Judgment: good  Cognition: (6) oriented: time, person, and place  attention span: intact  concentration: intact  recent " memory: intact  remote memory: intact  fund of knowledge: appropriate  Gait and Station: N/A (telehealth)    LABS and DATA     PHQ9 TODAY = not completed  PHQ 3/30/2021 8/2/2021 9/14/2021   PHQ-9 Total Score 16 19 18   Q9: Thoughts of better off dead/self-harm past 2 weeks Several days More than half the days Several days   F/U: Thoughts of suicide or self-harm Yes - Yes   F/U: Self harm-plan No - No   F/U: Self-harm action No - No   F/U: Safety concerns No - No       Recent Labs   Lab Test 10/11/21  1123 06/16/21  0741   CR 0.91 1.09   GFRESTIMATED >90 77     Recent Labs   Lab Test 10/11/21  1123 06/16/21  0741   AST 37 23   ALT 47 33   ALKPHOS 102 88     Recent Labs   Lab Test 10/11/21  1123 06/16/21  0741   GLC 97 92   A1C 5.2  --      Recent Labs   Lab Test 10/11/21  1123   CHOL 209*   TRIG 225*   *   HDL 30*     Recent Labs   Lab Test 10/11/21  1123 06/16/21  0741   AST 37 23   ALT 47 33   ALKPHOS 102 88     Recent Labs   Lab Test 10/11/21  1123 06/16/21  0741 03/21/21  0808 03/21/21  0808 03/17/21  0748 03/17/21  0748   WBC 7.0 6.1   < > 6.8   < > 8.3   ANEU  --   --   --  3.7  --  4.4   HGB 16.2 14.6   < > 15.6   < > 15.0    166   < > 246   < > 211    < > = values in this interval not displayed.     PSYCHOTROPIC DRUG INTERACTIONS   Increased risk of QTc prolongation: fluoxetine, quetiapine, Suboxone  Increased risk of serotonin syndrome: fluoxetine, Suboxone  Increased risk of CNS/respiratory depression: Suboxone, quetiapine    MANAGEMENT:  Monitoring for adverse effects and patient is aware of risks    RISK STATEMENT for SAFETY     Today, Jamison Breen does not report any suicidal ideation and does not appear to be an imminent risk to self or others.    SUICIDE RISK ASSESSMENT-  Risk factors for self-harm: previous suicide attempt, recent psych inpt , financial/legal stress, significant pain and takes opiates.  Mitigating factors: h/o seeking help , commitment to family and participation in DBT  or day program.  The patient does not appear to be at imminent risk for self-harm, hospitalization is recommended which the pt does not agree to. No hospitalization will be arranged. Safety plan reviewed today including contacting family, friends (), day treatment team, clinic, or crisis line.    TREATMENT RISK STATEMENT:  The risks, benefits, alternatives and potential adverse effects have been discussed and are understood by the pt. The pt understands the risks of using street drugs or alcohol. There are no medical contraindications, the pt agrees to treatment with the ability to do so. The pt knows to call the clinic for any problems or to access emergency care if needed.  Medical and substance use concerns are documented above.  Psychotropic drug interaction check was done, including changes made today.    PROVIDER: Slim Sage MD    Patient was not staffed.  Supervisor is Dr. Downing who will sign the note.

## 2021-10-19 ENCOUNTER — VIRTUAL VISIT (OUTPATIENT)
Dept: PSYCHIATRY | Facility: CLINIC | Age: 56
End: 2021-10-19
Attending: PSYCHIATRY & NEUROLOGY
Payer: COMMERCIAL

## 2021-10-19 DIAGNOSIS — F32.A DEPRESSION, UNSPECIFIED DEPRESSION TYPE: ICD-10-CM

## 2021-10-19 DIAGNOSIS — F41.9 ANXIETY: ICD-10-CM

## 2021-10-19 PROBLEM — F32.9 MAJOR DEPRESSION: Status: ACTIVE | Noted: 2020-10-06

## 2021-10-19 PROCEDURE — 99214 OFFICE O/P EST MOD 30 MIN: CPT | Mod: 95 | Performed by: STUDENT IN AN ORGANIZED HEALTH CARE EDUCATION/TRAINING PROGRAM

## 2021-10-19 RX ORDER — QUETIAPINE FUMARATE 100 MG/1
150 TABLET, FILM COATED ORAL AT BEDTIME
Qty: 45 TABLET | Refills: 1 | Status: SHIPPED | OUTPATIENT
Start: 2021-10-19 | End: 2021-11-30

## 2021-10-19 RX ORDER — FLUOXETINE 40 MG/1
40 CAPSULE ORAL DAILY
Qty: 30 CAPSULE | Refills: 1 | Status: SHIPPED | OUTPATIENT
Start: 2021-10-19 | End: 2021-11-30

## 2021-10-19 RX ORDER — LIOTHYRONINE SODIUM 25 UG/1
25 TABLET ORAL DAILY
Qty: 30 TABLET | Refills: 1 | Status: SHIPPED | OUTPATIENT
Start: 2021-10-19 | End: 2021-11-30

## 2021-10-19 ASSESSMENT — PAIN SCALES - GENERAL: PAINLEVEL: EXTREME PAIN (8)

## 2021-10-19 NOTE — PATIENT INSTRUCTIONS
Nice to see you today Les. As we discussed:   - look on [employer] to find a therapist in your area who does either CBT or ACT (acceptance and commitment therapy).  - Follow-up with PCP about lipid panel.    **For crisis resources, please see the information at the end of this document**     Patient Education      Thank you for coming to the [facility] [place] [facility].    Lab Testing:  If you had lab testing today and your results are reassuring or normal they will be mailed to you or sent through [employer] within 7 days. If the lab tests need quick action we will call you with the results. The phone number we will call with results is # [phone] (home) . If this is not the best number please call our clinic and change the number.    Medication Refills:  If you need any refills please call your pharmacy and they will contact us. Our fax number for refills is [phone]. Please allow three business for refill processing. If you need to  your refill at a new pharmacy, please contact the new pharmacy directly. The new pharmacy will help you get your medications transferred.     Scheduling:  If you have any concerns about today's visit or wish to schedule another appointment please call our office during normal business hours [phone] (8-5:00 M-F)    Contact Us:  Please call [phone] during business hours (8-5:00 M-F).  If after clinic hours, or on the weekend, please call  [phone].    Financial Assistance [phone]  [facility] Billing [phone]  Central Billing Office, [facility]: [phone]  [facility] Billing [phone]  Medical Records [phone]  [facility] Patient Bill of Rights [email or web address]       MENTAL HEALTH CRISIS NUMBERS:  For a medical emergency please call  911 or go to the nearest ER.     [facility]:   [facility] -[phone]   Crisis  Residence [facility] -[phone]   Walk-In Counseling Center [facility] -[phone]   COPE 24/7 [facility] Mobile Team -[phone] (adults)/[phone] (child)  CHILD: [facility] needs assessment team - [phone]      [facility]:   [facility] - [phone]   Walk-in counseling [facility] - [phone]   Walk-in counseling [facility] - [phone]   Crisis Residence [facility] - [phone]  Urgent Care Adult Mental [facility]-[phone] mobile unit/ 24/7 crisis line    National Crisis Numbers:   National Suicide Prevention Lifeline: 7-836-756-TALK (321-707-7442)  Poison Control Center - 1-419.100.7945  [employer]/resources for a list of additional resources (SOS)  Trans Lifeline a hotline for transgender people 1-652.813.1217  The Tu Project a hotline for LGBT youth 4-776-594-2268  Crisis Text Line: For any crisis 24/7   To: 751499  see www.crisistextline.org  - IF MAKING A CALL FEELS TOO HARD, send a text!         Again thank you for choosing [facility] and please let us know how we can best partner with you to improve you and your family's health.    You may be receiving a survey regarding this appointment. We would love to have your feedback, both positive and negative. The survey is done by an external company, so your answers are anonymous.

## 2021-10-19 NOTE — PROGRESS NOTES
"VIDEO VISIT  Jailene Breen is a 56 year old patient that has consented to receive services via billable video visit.      The patient has been notified of following:   \"This video visit will be conducted via a call between you and your physician/provider. We have found that certain health care needs can be provided without the need for an in-person physical exam. This service lets us provide the care you need with a video conversation. If a prescription is necessary we can send it directly to your pharmacy. If lab work is needed we can place an order for that and you can then stop by our lab to have the test done at a later time. Insurers are generally covering virtual visits as they would in-office visits so billing should not be different than normal.  If for some reason you do get billed incorrectly, you should contact the billing office to correct it and that number is in the AVS .    Patient will join video visit via:  Artsicle (Patient / guardian confirmed to join via Artsicle)    If patient attempts to join the video via Artsicle at appointment start time, but is unable to, they would prefer that the provider send them a video invitation via:   Send to preferred e-mail: [email or web address]      How would patient like to obtain AVS?:  MyChart  "

## 2021-10-19 NOTE — PROGRESS NOTES
Jamison is a 56 year old who is being evaluated via a billable video visit.      How would you like to obtain your AVS? MyChart  If the video visit is dropped, the invitation should be resent by: Text to cell phone: [phone]  Will anyone else be joining your video visit? No   Is Pt currently in [place]? Yes     Nicolasa Krueger MA    NOTE:  If Pt is not in [place], Appointment needs to be canceled and rescheduled.          Video-Visit Details    Type of service:  Video Visit  Video Start Time: 9:14 AM  Video End Time:9:44 AM    Originating Location (pt. Location): Home    Distant Location (provider location):  [facility]     Platform used for Video Visit: [employer]          [facility]    10/20/2021        Chief complaint:   - lower  back pain radiates to the groin and down the right leg   -right hip pain  -Bilateral knee pain  -hand pain are also sore      Interval history:   Jailene Breen is a 56 year old male is known to me for   Lumbar spondylosis, pain is worse with extension and rotation indicating a facetogenic component to pain  Lumbar degenerative disc disease  SI joint pain  Ankylosing spondylitis  Myofascial pain  Chronic pain syndrome  PMHx includes: Panic attacks, back pain, arthritis, anxiety, ankylosing spondylitis  PSHx includes: Right knee surgery ×2, left foot surgery right knee arthroscopy        Recommendations/plan at the last visit on 4/21/2021 included:  1. Recommended to increase activity while pacing himself  2. Physical Therapy:  Let's do this once you are done with the PHP program  3. Clinical Health Psychologist: keep working with your psychiatrist and therapist  4. Diagnostic Studies:  None  5. Medication Management:    1. continue chlorzoxazone 500mg three times daily as needed for muscle spasms  2. Continue Suboxone 8/2mg films, use 0.5 film under the tongue every 6 hours.   3. Patient certified for medical cannabis today  4. Consider  "restarting Lyrica in the future  6. Further procedures recommended: none at present  7. Recommendations to PCP: see above  8. Follow up: 4-6 weeks via video due to COVID-19 viral threat. Please call [phone] to make your follow-up appointment with me.         Since his last visit, Jailene Breen reports:    Interval history October 20, 2021  -he is hurting all over as he previously tapered off of the Suboxone in prep for his hernia surgery. This surgery was postponed due to no beds available due to COVID-19 pandemic. His pain is bad now with no pain medication on board.   -Jamison had preferred to taper off of Suboxone in prep for surgery despite discussing ability to medicate over it and maintain his previous dosing.   -explained that best course of action would be to get him back on Suboxone between 8-16mg/day during the perioperative period (he had been on 16mg/day in divided dosing prior to taper).   -Les did not previously feel that Suboxone was very helpful, but he does notice that his pain is worse being off of the Suboxone.   -he had his court case for disability recently and he states that his  feels that it went well, waiting to see if this ruling will be in his favor.         Interval history April 21, 2021  -he had COVID injection on Friday 4/16/2021 this was his 2nd injection. [place] crummy over the weekend.   -he is still dealing with the right hip pain that is the worst pain  -he is having left hip pain as well   -bilateral knee pain as well. It is hard for him to do stairs due to pain.   -he feels that the increase in Suboxone to 4/1mg QID has been a \"titch\" helpful.   -he does not find Voltaren gel or Aspercreme with 4% lidocaine helpful for his knee pain.  -he has finished the partial hospitalization program.  -he will be starting the adult day program for psychiatric care.       Interval history April 6, 2021  -Jamison states he has been feeling like \"hell.\"   -his low back is bothering him " "and it will radiate into his right groin and down his right leg to the foot.   -he is walking with a cane.   - He was hospitalized twice recently for suicidal ideation. Mood is still low. Denies SI   -he is attending the Partial Hospitalization program. This is all on Zoom. Lasts for 6 hours Monday through Friday.   -he is also working with his psychiatrist and psychologist in addition to that.  -he tries to walk a few times per week, he thinks this is about a half a mile or so.  -he also notes he tries not to go out much to avoid injury or over-activity.   -he is back on Enbrel and methotrexate. He notes that this is a bit helpful for his joint pain.   -he does not get any relief from the Subxone. He states it \"doesn't do anything.\" he relates he has been on OxyContin 20mg TID in the past. He is frustrated that I switched him off of Oxycodone/OxyContin when he was in the hospital for SI. We discussed again how being on full  Mu-agonist opiates are not a good option for him. Additionally, when we began working together on 3/31/2017, he was NOT on daily opiates. In the time we have worked together, he has gone from being on no opiates on a daily basis to #15 tabs/month of oxycodone to slowly increasing to Oxycodone 35mg/day in divided dosing using OxyContin 10mg BID and oxycodone 5mg TID PRN which is 52mg OME (oral morphine equivalent, also known as morphine milligram equivalent-MME). In that time, his functional level and his mood has deteriorated despite increasing opiate dosages. -  -discussed I am open to continuing Suboxone and recommend increasing to 4/1mg every 8 hours, a 50% increase. Patient stated that \"that isn't going to be high enough.\" when I asked what he felt might be helpful, he stated he felt the dosage should be tripled. Explained that doing so is not a safe choice. Additionally, discussed that Suboxone hits the peak pain relief (plateaus) at between 16-18mg/day as the mu-receptors are all flooded. "   He is concerned re: having upcoming surgery for a hiatal hernia in May. Reviewed that he can be on short-acting opiates as his surgeon deems necessary, but that he would be tapered off of them and continue on Suboxone with me long term.       Interval history February 16, 2021  -He has had more pain since the right  RFA done 2/1/2021.  -he slipped taking his garbage out last week, he tweaked his back, did not fall.    -he is off of his RA meds as he has an upper respiratory infection  -the extreme cold makes his arthritis pain worse  -he may be having a surgery to fix a hiatal hernia  -flexeril caused urinary retention, this was better when he stopped taking it.       Interval history 11/11/2020  -he has been having issues with emptying his bladder and he has had 2 Carrasco catheters in the near future  -he is trying to get in with a [place] [facility]ist.  -we discussed how opiate medication can cause urinary hesitancy  -he feels like the urinary symptoms began a couple of weeks after he began lexapro.  -he states he recently found out that his biological dad has had issues with his bladder.   -he had 2nd lumbar medial branch block done today with Dr. Ashlyn Gamble, we are working toward lumbar RFA..       Interval history 9/25/2020  -He has been sick 10 days ago,  had been on antibiotics and was not able to take his RA meds. He then felt better and then is now feeling worse again. Had been tested for COVID-19  -how his whole house is not feeling well.   -he is off of his RA meds, and so his pain is worse because he doesn't feel well, he has a really deep cough.  -having 2nd LMBB next week on the left side, then plan to do bilateral lumbar RFA  -he can't sleep due to body aches and then his back pain is worse.   -recently seen by psychiatry for first time this week, placed on escitalopram for depression/anxiety and hydroxyzine for panic/anxiety PRN, Les tells me that the psychiatrist wants him to stop using the  "clonazepam.       Interval history 6/10/2020  -having bilateral low back pain that radiates into the groin at times  -having multiple joint pain from RA, his RA flare is better as he is back on the Enbrel now  -he would love to get a new bed as he has issues getting comfortable in bed.   -he is having more left sided low back pain, worse with lumbar extension and extension and rotation. He is interested in pursing possible left sided lumbar RFA.      Interval 2020  -his 28 year old niece overdosed and  last Saturday, she had a 12,5 and 1 year old.   -he is going to her  today  -he did get his Enbrel yesterday  -he is going to see a new rheumatologist in July with the [employer]    -he says his pain is now a bit better  -he has multiple joint pain from RA and chronic low back pain        At this point, the patient's participation with our multidisciplinary team includes:  The patient has been compliant with the program.    PT - has seen Cyndee White, none recently  Health Psych - worked with  Padilla Keene, last on 2018.  Working with Jonna Farrell PhD and been seen >8 times since 2020         Pain scores:    Pain intensity on average is 9 on a scale of 0-10.    Range is 5-10++/10. (his pain can bring him to tears)  Pain right now is 8/10.   Pain is described as \"shooting, throbbing, penetrating, burning, and stabbing.\"  Pain is constant in nature    Current pain relevant medications:      -nortriptyline 50mg at bedtime (helpful)  -Enbrel 50mg/mL   -ibuprofen 800mg TID PRN (using 3 times daily-helpful)  -Tylenol 500mg using 1000mg TID (unsure if helpful)  -Maxalt 10mg PRN migraine (helpful for migraine--he does have visual aura)  -naloxone nasal spray PRN for accidental opiate overdose  -chlorzoxazone 500mg TID PRN (not using regularly, not much with muscle spasms right now)      Other pertinent medications:   -clonazepam 1mg tab, may take 0.5-1mg BID PRN anxiety (helpful, has been " using infrequently)  Fluoxetine 40mg every day (somewhat helpful, managed by psychiatry)  -quetiapine 150mg at bedtime        Previous Medications:   OPIATES: Oxycodone (helpful), Fentanyl (100mcg/hr patch helpful), hydrocodone (itching), Tramadol (not helpful), Suboxone (initially felt it was not helpful, but after he tapered off of it due to his preference prior to surgery, he now feels that it was helpful for his pain)  NSAIDS: ibuprofen (not helpful), Aleve (not helpful)  MUSCLE RELAXANTS: methocarbamol (somewhat helpful), Flexeril (somewhat helpful but caused severe urinary retention requiring catheterization), tizanidine (unsure if helpful), metaxalone (unsure), SOMA (helpful)  ANTI-MIGRAINE MEDS: Maxalt (helpful for migraine), Imitrex injection (somewhat helpful)  ANTI-DEPRESSANTS: Prozac (helpful), Cymbalta (felt weird on it), nortriptyline (unsure if helpful), Buspar (unsure), Remeron (blacked out), bupropion (not helpful for smoking cessation)  SLEEP AIDS: Ambien (helpful)  ANTI-CONVULSANTS: gabapentin (felt odd on this medication, unsure of dose), Lyrica (not helpful for 4 months, unsure of dose), Topamax (not helpful, he felt weird on it)   TOPICALS: Lidocaine patches (not helpful), Voltaren gel (not helpful)  Other meds: Tylenol (unsure if helpful)        Other treatments have included:   Jailene Breen has been seen at a pain clinic in the past.  [facility]  PT: tried, not helpful  Chiropractic: tried, not helpful  Acupuncture: none  TENs Unit: tried, helpful         Injections:     -has had injections at outside clinics  -has had epidural injections for low back pain (one was helpful)  -he has had lumbar medial branch blocks at [facility] and he was going to have lumbar radiofrequency ablation (did not do this due to cost)  -4/3/2017 right LMBBs with Dr. Ashlyn Gamble (helpful)  -4/26/2017 right LMBBs with Dr. Ashlyn Gamble (helpful)  -5/31/2017 right L3, L4, L5 RFA with Dr. Goldberg  "Tye (good relief for over 6 months)  -3/15/2018 right L5 transforaminal MAKAYLA with Dr. Ashlyn Gamble (not helpful)  -5/10/2018 trigger point injections with Dr. Ashlyn Gamble(somewhat helpful).  -7/12/2018 Right L3, L4, L5 RFA with Dr. Ashlyn Gamble (helpful).  -9/20/2018 Left piriformis injections, bilateral gluteal trigger point with Dr. Gamble (helpful).  -1/31/2019 right L2-3, L3-4 and L4-5 facet joint injections with Dr. Ashlyn Gamble (somewhat helpful)  4/4/2019  right L3, L4, L5/sacral ala lumbar radiofrequency ablation with Dr. Gamble (helpful over right side)  6/28/2019 Bilateral facet joint injections at l4-5, L5-S1 with Dr. Holley (only helpful for 7 days)  -2/12/2020 right N1-K2-N5-sacral ALA RFA done with Dr. Ashlyn Gamble (helpful)  -8/26/2020 left L3-5 lumbar medial branch blocks #1 with Dr. Ashlyn Gamble (very helpful)  -11/11/2020 left L3-5 lumbar medial branch blocks #2 with Dr. Ashlyn Gamble (too soon to tell, done today helpful)   -2/1/2021 right lumbar RFA L4-5 and L5-S1 with Dr. Ashlyn Gamble   (this \"helped a little bit\" and then he tweaked his back about 2 weeks after it was done and it wasn't helpful)            Side Effects: no side effect  Patient is using the medication as prescribed: YES    Medications:  Current Outpatient Medications   Medication Sig Dispense Refill     acetaminophen (TYLENOL) 500 MG tablet Take 1,000 mg by mouth every 6 hours as needed for mild pain (headache)       buprenorphine HCl-naloxone HCl (SUBOXONE) 8-2 MG per film Use 0.5 film under the tongue 4 times per day for pain, approximately every 6 hours. Max 2 films per day. Fill/begin 9/8/2021. 30 day supply (Patient not taking: Reported on 10/11/2021) 60 each 0     chlorzoxazone (PARAFON FORTE) 500 MG tablet Take 1 tablet (500 mg) by mouth 3 times daily as needed for muscle spasms 45 tablet 1     etanercept (ENBREL SURECLICK) 50 MG/ML autoinjector Inject 50 mg Subcutaneous once a week 1 mL 3     fish " oil-omega-3 fatty acids 1000 MG capsule Take 1 g by mouth daily       FLUoxetine (PROZAC) 40 MG capsule Take 1 capsule (40 mg) by mouth daily 30 capsule 1     folic acid (FOLVITE) 1 MG tablet Take 5 tablets (5 mg) by mouth daily 150 tablet 3     liothyronine (CYTOMEL) 25 MCG tablet Take 1 tablet (25 mcg) by mouth daily 30 tablet 1     medical cannabis (Patient's own supply) See Admin Instructions (The purpose of this order is to document that the patient reports taking medical cannabis.  This is not a prescription, and is not used to certify that the patient has a qualifying medical condition.)     Pills PRN   Lozenges PRN       methotrexate sodium 2.5 MG TABS TAKE 4 TABLETS BY MOUTH ONCE A WEEK 48 tablet 0     multivitamin (CENTRUM SILVER) tablet Take 1 tablet by mouth daily       nicotine polacrilex (NICORETTE) 4 MG gum PLACE 1 PIECE INSIDE CHEEK AS NEEDED FOR SMOKING CESSATION 240 each 1     omeprazole (PRILOSEC) 20 MG DR capsule Take 20 mg by mouth daily as needed        QUEtiapine (SEROQUEL) 100 MG tablet Take 1.5 tablets (150 mg) by mouth At Bedtime 45 tablet 1     QUEtiapine (SEROQUEL) 25 MG tablet Take 1-2 tablets (25-50 mg) by mouth daily as needed (for severe anxiety/panic attacks or sleep) (Patient not taking: Reported on 9/8/2021) 30 tablet 0     vitamin D2 (ERGOCALCIFEROL) 80999 units (1250 mcg) capsule Take 50,000 Units by mouth twice a week mon and thurs         Medical History: any changes in medical history since they were last seen? No    Social History:    Home situation: , has 2 grown children  Occupation/Schooling: currently unemployed, worked as a  (unemployed since 3/1/2017). 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  Uses nicotine gum  If yes, was cessation counseling offered?   None needed        Physical Exam:     Vital signs: There were no vitals taken for this visit.     Behavioral observations:  Awake, alert. Cooperative.   Pulm: respirations easy and unlabored. Able to speak in full sentences without SOB or cough noted.            [place] Prescription Monitoring Program:  Reviewed [place]  10/20/2021- no concerning fills.  Susana GRANT RN CNP, FNP  [facility]        DIRE Score for selecting candidates for long term opioid analgesia for chronic pain:  Diagnosis  2  Intractablility  2  Risk    Psychological health  1    Chemical health  2    Reliability  2    Social support  2  Efficacy  1    Total DIRE Score = 12. Note that  7-13 predicts poor outcome (compliance and efficacy) from opioid prescribing; 14-21 predicts good outcome (compliance and efficacy)  from opioid prescribing.      Assessment:    1. Uncomplicated opiate dependence  2. Right hip pain  3. Chronic bilateral low back pain with right sided sciatica  4. Lumbar facet joint pain    5. DDD, lumbar  6. Ankylosing spondylitis of lumbosacral region  7. RA involving multiple joints  8. Chronic pain syndrome    9. Encounter for long-term opiate analgesic use  10. PMHx includes: Panic attacks, back pain, arthritis, anxiety, ankylosing spondylitis  11. PSHx includes: Right knee surgery ×2, left foot surgery right knee arthroscopy      Plan:    1. Recommended to increase activity while pacing himself  2. Physical Therapy:  Let's do this once you are done with the PHP program  3. Clinical Health Psychologist: keep working with your psychiatrist and therapist  4. Diagnostic Studies:  None  5. Medication Management:    1. continue chlorzoxazone 500mg three times daily as needed for muscle spasms  2. restart Suboxone 4/1mg films, use 0.5 film under the tongue every 6 hours, recommend use half film twice daily for first 2 days, then take every 6 hours.    3. Patient certified for medical cannabis in  April 2021  6. Further procedures recommended: none at present  7. Recommendations to PCP: see above  8. Follow up: 4-6 weeks via video due to COVID-19 viral threat. Please call [phone] to make your follow-up appointment with me.           BILLING TIME DOCUMENTATION:   The total TIME spent on this patient on the day of the appointment was 44 minutes.      TOTAL TIME includes:   Time spent preparing to see the patient: 0 minutes (reviewing records and tests)  Time spend face to face with the patient: 30 minutes  Time spent ordering tests, medications, procedures and referrals: 0 minutes  Time spent Referring and communicating with other healthcare professionals: 0 minutes  Documenting clinical information in Epic: 14 minutes          Susana GRANT, RN CNP, FNP  [facility]  [facility] [place]

## 2021-10-20 ENCOUNTER — VIRTUAL VISIT (OUTPATIENT)
Dept: PALLIATIVE MEDICINE | Facility: CLINIC | Age: 56
End: 2021-10-20
Payer: COMMERCIAL

## 2021-10-20 DIAGNOSIS — G89.4 CHRONIC PAIN SYNDROME: ICD-10-CM

## 2021-10-20 DIAGNOSIS — M54.41 CHRONIC BILATERAL LOW BACK PAIN WITH RIGHT-SIDED SCIATICA: ICD-10-CM

## 2021-10-20 DIAGNOSIS — M45.7 ANKYLOSING SPONDYLITIS OF LUMBOSACRAL REGION (H): ICD-10-CM

## 2021-10-20 DIAGNOSIS — M25.551 HIP PAIN, RIGHT: ICD-10-CM

## 2021-10-20 DIAGNOSIS — F11.20 UNCOMPLICATED OPIOID DEPENDENCE (H): Primary | ICD-10-CM

## 2021-10-20 DIAGNOSIS — M51.369 DDD (DEGENERATIVE DISC DISEASE), LUMBAR: ICD-10-CM

## 2021-10-20 DIAGNOSIS — G89.29 CHRONIC BILATERAL LOW BACK PAIN WITH RIGHT-SIDED SCIATICA: ICD-10-CM

## 2021-10-20 DIAGNOSIS — M05.79 RHEUMATOID ARTHRITIS INVOLVING MULTIPLE SITES WITH POSITIVE RHEUMATOID FACTOR (H): ICD-10-CM

## 2021-10-20 DIAGNOSIS — M54.59 LUMBAR FACET JOINT PAIN: ICD-10-CM

## 2021-10-20 PROCEDURE — 99215 OFFICE O/P EST HI 40 MIN: CPT | Mod: 95 | Performed by: NURSE PRACTITIONER

## 2021-10-20 RX ORDER — BUPRENORPHINE AND NALOXONE 4; 1 MG/1; MG/1
FILM, SOLUBLE BUCCAL; SUBLINGUAL
Qty: 30 EACH | Refills: 0 | Status: SHIPPED | OUTPATIENT
Start: 2021-10-20 | End: 2022-01-06 | Stop reason: DRUGHIGH

## 2021-10-20 ASSESSMENT — PAIN SCALES - GENERAL: PAINLEVEL: EXTREME PAIN (8)
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
## 2021-10-20 NOTE — PATIENT INSTRUCTIONS
Plan:    1. Recommended to increase activity while pacing himself  2. Physical Therapy:  Let's do this once you are done with the PHP program  3. Clinical Health Psychologist: keep working with your psychiatrist and therapist  4. Diagnostic Studies:  None  5. Medication Management:    1. continue chlorzoxazone 500mg three times daily as needed for muscle spasms  2. restart Suboxone 4/1mg films, use 0.5 film under the tongue every 6 hours, recommend use half film twice daily for first 2 days, then take every 6 hours.    3. Patient certified for medical cannabis in April 2021  6. Further procedures recommended: none at present  7. Recommendations to PCP: see above  8. Follow up: 4-6 weeks via video due to COVID-19 viral threat. Please call [phone] to make your follow-up appointment with me.       ----------------------------------------------------------------  Clinic Number:  [phone]     Call with any questions about your care and for scheduling assistance.     Calls are returned Monday through Friday between 8 AM and 4:30 PM. We usually get back to you within 2 business days depending on the issue/request.    If we are prescribing your medications:    For opioid medication refills, call the clinic or send a [employer] message 7 days in advance.  Please include:    Name of requested medication    Name of the pharmacy.    For non-opioid medications, call your pharmacy directly to request a refill. Please allow 3-4 days to be processed.     Per [place] State Law:    All controlled substance prescriptions must be filled within 30 days of being written.      For those controlled substances allowing refills, pickup must occur within 30 days of last fill.      We believe regular attendance is key to your success in our program!      Any time you are unable to keep your appointment we ask that you call us at least 24 hours in advance to cancel.This will allow us to offer the appointment time to another patient.      Multiple missed appointments may lead to dismissal from the clinic.

## 2021-10-21 ENCOUNTER — HOSPITAL ENCOUNTER (OUTPATIENT)
Dept: BEHAVIORAL HEALTH | Facility: CLINIC | Age: 56
End: 2021-10-21
Attending: PSYCHIATRY & NEUROLOGY
Payer: COMMERCIAL

## 2021-10-21 PROCEDURE — 90853 GROUP PSYCHOTHERAPY: CPT | Mod: GT,95

## 2021-10-21 NOTE — GROUP NOTE
Process Group Note    PATIENT'S NAME: Jailene Breen  MRN:   [id number]  :   1965  ACCT. NUMBER: [id number]  DATE OF SERVICE: 10/21/21  START TIME:  9:30 AM  END TIME: 11:20 AM  FACILITATOR: Venecia Quesada  TOPIC:  Process Group    Diagnoses:  296.32 Major Depressive Disorder, recurrent, moderate  300.022 JEFF  300.01  Panic Disorder       [facility] Day [facility]  TRACK: Clinic 2                                      Service Modality:  Video Visit     Telemedicine Visit: The patient's condition can be safely assessed and treated via synchronous audio and visual telemedicine encounter.      Reason for Telemedicine Visit:  covid 19    Originating Site (Patient Location): Patient's home    Distant Site (Provider Location): Provider Remote Setting- Home Office    Consent:  The patient/guardian has verbally consented to: the potential risks and benefits of telemedicine (video visit) versus in person care; bill my insurance or make self-payment for services provided; and responsibility for payment of non-covered services.     Patient would like the video invitation sent by:  My Chart    Mode of Communication:  Video Conference via Medical Zoom    As the provider I attest to compliance with applicable laws and regulations related to telemedicine.          NUMBER OF PARTICIPANTS: 4          Data:    Session content: At the start of this group, patients were invited to check in by identifying themselves, describing their current emotional status, and identifying issues to address in this group.   Area(s) of treatment focus addressed in this session included Symptom Management, Personal Safety, Develop / Improve Independent Living Skills and Develop Socialization / Interpersonal Relationship Skills.  Jamison reported  Surgery delayed as not enough beds due to covid.  He reported this is difficult at he feels ongoing anticipatory anxiety.  He is also waiting to hear back from court about  disability hearing.  He still feels hopeful.  He has poor sleep and ongoin pain.  He feels glad to not have classes going on right now.  He reported feeling mentally exhausted.  He is trying to go for walks and watch movies to cope.     Therapeutic Interventions/Treatment Strategies:  Psychotherapist offered support, feedback and validation and reinforced use of skills. Treatment modalities used include Motivational Interviewing, Cognitive Behavioral Therapy and Dialectical Behavioral Therapy. Validated and normalized.  Highlighted and reinforced skills used; connected to positive outcomes.  Provided additional skill suggestions.  Aided in creating a plan to help work towards goals.        Assessment:    Patient response:   Patient responded to session by accepting feedback, giving feedback, listening, focusing on goals, being attentive and accepting support    Possible barriers to participation / learning include: and no barriers identified    Health Issues:   Yes: Pain, Associated Psychological Distress  Sleep disturbance, Associated Psychological Distress       Substance Use Review:   Substance Use: No active concerns identified.    Mental Status/Behavioral Observations  Appearance:   Appropriate   Eye Contact:   Good   Psychomotor Behavior: Normal   Attitude:   Cooperative   Orientation:   All  Speech   Rate / Production: Normal    Volume:  Normal   Mood:    Anxious  Depressed   Affect:    Appropriate   Thought Content:   Rumination  Thought Form:  Coherent  Logical     Insight:    Good     Plan:     Safety Plan: No current safety concerns identified.  Recommended that patient call 911 or go to the local ED should there be a change in any of these risk factors.     Barriers to treatment: None identified    Patient Contracts (see media tab):  None    Substance Use: Not addressed in session     Continue or Discharge: Patient will continue in Adult Day Treatment (ADT)  as planned. Patient is likely to benefit from  learning and using skills as they work toward the goals identified in their treatment plan.      Venecia Quesada  October 21, 2021

## 2021-10-23 ENCOUNTER — HEALTH MAINTENANCE LETTER (OUTPATIENT)
Age: 56
End: 2021-10-23

## 2021-10-25 ENCOUNTER — MYC MEDICAL ADVICE (OUTPATIENT)
Dept: FAMILY MEDICINE | Facility: CLINIC | Age: 56
End: 2021-10-25

## 2021-10-25 DIAGNOSIS — F17.200 TOBACCO USE DISORDER: ICD-10-CM

## 2021-10-28 ENCOUNTER — HOSPITAL ENCOUNTER (OUTPATIENT)
Dept: BEHAVIORAL HEALTH | Facility: CLINIC | Age: 56
End: 2021-10-28
Attending: PSYCHIATRY & NEUROLOGY
Payer: COMMERCIAL

## 2021-10-28 PROCEDURE — 90853 GROUP PSYCHOTHERAPY: CPT | Mod: GT,95

## 2021-10-28 NOTE — GROUP NOTE
Process Group Note    PATIENT'S NAME: Jailene Breen  MRN:   [id number]  :   1965  ACCT. NUMBER: [id number]  DATE OF SERVICE: 10/28/21  START TIME:  9:00 AM  END TIME: 10:50 AM  FACILITATOR: Venecia Quesada  TOPIC: MH Process Group    Diagnoses:  296.32 Major Depressive Disorder, recurrent, moderate  300.022 JEFF  300.01  Panic Disorder       [facility] Day Treatment  TRACK: clinic 2                                      Service Modality:  Video Visit     Telemedicine Visit: The patient's condition can be safely assessed and treated via synchronous audio and visual telemedicine encounter.      Reason for Telemedicine Visit: Services only offered telehealth    Originating Site (Patient Location): Patient's home    Distant Site (Provider Location): Provider Remote Setting- Home Office    Consent:  The patient/guardian has verbally consented to: the potential risks and benefits of telemedicine (video visit) versus in person care; bill my insurance or make self-payment for services provided; and responsibility for payment of non-covered services.     Patient would like the video invitation sent by:  My Chart    Mode of Communication:  Video Conference via Medical Zoom    As the provider I attest to compliance with applicable laws and regulations related to telemedicine.          NUMBER OF PARTICIPANTS: 8          Data:    Session content: At the start of this group, patients were invited to check in by identifying themselves, describing their current emotional status, and identifying issues to address in this group.   Area(s) of treatment focus addressed in this session included Symptom Management, Personal Safety, Develop / Improve Independent Living Skills and Develop Socialization / Interpersonal Relationship Skills.  Les reported moods have been up and down.  He reported disability got approved and now is waiting to find out when he will get money;.  He feels relief but also feels  worried about somethinhg falling through.  He hopes to pay off car and bills, buy a new bed, and take a trip to see his son.  He hopes to go back to school one day and even get a part time job.  He hopes this will help him feel more stable and less depressed.      Therapeutic Interventions/Treatment Strategies:  Psychotherapist offered support, feedback and validation and reinforced use of skills. Treatment modalities used include Motivational Interviewing, Cognitive Behavioral Therapy and Dialectical Behavioral Therapy. Validated and normalized.  Highlighted and reinforced skills used; connected to positive outcomes.  Provided additional skill suggestions.  Aided in creating a plan to help work towards goals.        Assessment:    Patient response:   Patient responded to session by accepting feedback, giving feedback, listening, focusing on goals, being attentive and accepting support    Possible barriers to participation / learning include: and no barriers identified    Health Issues:   Yes: Sleep disturbance, Associated Psychological Distress       Substance Use Review:   Substance Use: No active concerns identified.    Mental Status/Behavioral Observations  Appearance:   Appropriate   Eye Contact:   Good   Psychomotor Behavior: Normal   Attitude:   Cooperative   Orientation:   All  Speech   Rate / Production: Normal    Volume:  Normal   Mood:    Anxious  Depressed   Affect:    Appropriate   Thought Content:   Rumination  Thought Form:  Coherent  Logical     Insight:    Good     Plan:     Safety Plan: No current safety concerns identified.  Recommended that patient call 911 or go to the local ED should there be a change in any of these risk factors.     Barriers to treatment: None identified    Patient Contracts (see media tab):  None    Substance Use: Not addressed in session     Continue or Discharge: Patient will continue in Adult Day Treatment (ADT)  as planned. Patient is likely to benefit from learning and  using skills as they work toward the goals identified in their treatment plan.      Venecia Quesada  October 28, 2021

## 2021-11-04 ENCOUNTER — HOSPITAL ENCOUNTER (OUTPATIENT)
Dept: BEHAVIORAL HEALTH | Facility: CLINIC | Age: 56
End: 2021-11-04
Attending: PSYCHIATRY & NEUROLOGY
Payer: COMMERCIAL

## 2021-11-04 PROCEDURE — 90853 GROUP PSYCHOTHERAPY: CPT | Mod: GT,95

## 2021-11-04 NOTE — GROUP NOTE
Process Group Note    PATIENT'S NAME: Jailnee Breen  MRN:   [id number]  :   1965  ACCT. NUMBER: [id number]  DATE OF SERVICE: 21  START TIME:  9:00 AM  END TIME: 11:00 AM  FACILITATOR: Kiara Daugherty LICSW  TOPIC:  Process Group    Diagnoses:  296.32 Major Depressive Disorder, recurrent, moderate  300.022 JEFF  300.01  Panic Disorder       [facility] Adult Mental Health Day Treatment  TRACK: clinic 2                              Service Modality:  Video Visit     Telemedicine Visit: The patient's condition can be safely assessed and treated via synchronous audio and visual telemedicine encounter.      Reason for Telemedicine Visit: Services only offered telehealth    Originating Site (Patient Location): Patient's home    Distant Site (Provider Location): [facility] MENTAL HEALTH & ADDICTION SERVICES    Consent:  The patient/guardian has verbally consented to: the potential risks and benefits of telemedicine (video visit) versus in person care; bill my insurance or make self-payment for services provided; and responsibility for payment of non-covered services.     Patient would like the video invitation sent by:  My Chart    Mode of Communication:  Video Conference via Medical Zoom    As the provider I attest to compliance with applicable laws and regulations related to telemedicine.         NUMBER OF PARTICIPANTS: 5          Data:    Session content: At the start of this group, patients were invited to check in by identifying themselves, describing their current emotional status, and identifying issues to address in this group.   Area(s) of treatment focus addressed in this session included Symptom Management and Personal Safety.  Pt reports feeling positive about ssdi being approved and receiving back-pay check. Pt reports waiting since  for this. He is enjoying spending money on long awaited necessities and additional purchases. He feels grateful for getting a comfortable bed  and hopes this will improve sleep. Pt bought and is enjoying an aquarium to relax. He reports using cannabis for pain management and has now determined the right dosage. He is looking forward to eventually returning to school and getting his degree. Denies safety concerns.    Therapeutic Interventions/Treatment Strategies:  Psychotherapist offered support, feedback and validation and reinforced use of skills. Treatment modalities used include Cognitive Behavioral Therapy. Interventions include Coping Skills: Discussed use of self-soothe skills to decrease distress in the body.    Assessment:    Patient response:   Patient responded to session by accepting feedback, listening, focusing on goals, being attentive and accepting support    Possible barriers to participation / learning include: and no barriers identified    Health Issues:   Yes: Chronic disease management, Associated Psychological Distress       Substance Use Review:   Substance Use: cannabis .     Mental Status/Behavioral Observations  Appearance:   Appropriate   Eye Contact:   Good   Psychomotor Behavior: Normal   Attitude:   Cooperative   Orientation:   All  Speech   Rate / Production: Normal    Volume:  Normal   Mood:    Normal  Affect:    Subdued   Thought Content:   Clear  Thought Form:  Coherent  Logical     Insight:    Good     Plan:     Safety Plan: No current safety concerns identified.  Recommended that patient call 911 or go to the local ED should there be a change in any of these risk factors.     Barriers to treatment: None identified    Patient Contracts (see media tab):  None    Substance Use: Not addressed in session     Continue or Discharge: Patient will continue in Adult Day Treatment (ADT)  as planned. Patient is likely to benefit from learning and using skills as they work toward the goals identified in their treatment plan.      Kiara Mccray, [facility] CenterSW  November 4, 2021

## 2021-11-08 ENCOUNTER — LAB (OUTPATIENT)
Dept: URGENT CARE | Facility: URGENT CARE | Age: 56
End: 2021-11-08
Payer: COMMERCIAL

## 2021-11-08 DIAGNOSIS — Z11.59 ENCOUNTER FOR SCREENING FOR OTHER VIRAL DISEASES: ICD-10-CM

## 2021-11-08 PROCEDURE — U0005 INFEC AGEN DETEC AMPLI PROBE: HCPCS

## 2021-11-08 PROCEDURE — U0003 INFECTIOUS AGENT DETECTION BY NUCLEIC ACID (DNA OR RNA); SEVERE ACUTE RESPIRATORY SYNDROME CORONAVIRUS 2 (SARS-COV-2) (CORONAVIRUS DISEASE [COVID-19]), AMPLIFIED PROBE TECHNIQUE, MAKING USE OF HIGH THROUGHPUT TECHNOLOGIES AS DESCRIBED BY CMS-2020-01-R: HCPCS

## 2021-11-09 LAB — SARS-COV-2 RNA RESP QL NAA+PROBE: NEGATIVE

## 2021-11-10 NOTE — TELEPHONE ENCOUNTER
Surgery 11-12-21 cancelled due to bed shortage @ [facility]. Rescheduled to 1-7-22.

## 2021-11-11 ENCOUNTER — HOSPITAL ENCOUNTER (OUTPATIENT)
Dept: BEHAVIORAL HEALTH | Facility: CLINIC | Age: 56
End: 2021-11-11
Attending: PSYCHIATRY & NEUROLOGY
Payer: COMMERCIAL

## 2021-11-11 PROCEDURE — 90853 GROUP PSYCHOTHERAPY: CPT | Mod: GT,95

## 2021-11-11 NOTE — PROGRESS NOTES
Acknowledgement of Current Treatment Plan         I have reviewed my treatment plan with my therapist / counselor on 10/14/21  I agree with the plan as it is written in the electronic health record. (clinic 2)     Name:                                    Signature:  Jamison Breen  Unable to Sign due to Covid 19      Dr Saul Quesada MD  Psychiatrist     Dr. Yasemin Bauer, [facility],      Venecia Quesada, [facility]  Psychotherapist  Electronically signed by Nathan Quesada

## 2021-11-11 NOTE — GROUP NOTE
Process Group Note    PATIENT'S NAME: Jailene Breen  MRN:   [id number]  :   1965  ACCT. NUMBER: [id number]  DATE OF SERVICE: 21  START TIME:  9:00 AM  END TIME: 10:50 AM  FACILITATOR: Venecia Quesada  TOPIC:  Process Group    Diagnoses:  296.32 Major Depressive Disorder, recurrent, moderate  300.022 JEFF  300.01  Panic Disorder       [facility] Day Treatment  TRACK: clinic 2                                      Service Modality:  Video Visit     Telemedicine Visit: The patient's condition can be safely assessed and treated via synchronous audio and visual telemedicine encounter.      Reason for Telemedicine Visit:  covid 19    Originating Site (Patient Location): Patient's home    Distant Site (Provider Location): Provider Remote Setting- Home Office    Consent:  The patient/guardian has verbally consented to: the potential risks and benefits of telemedicine (video visit) versus in person care; bill my insurance or make self-payment for services provided; and responsibility for payment of non-covered services.     Patient would like the video invitation sent by:  My Chart    Mode of Communication:  Video Conference via Medical Zoom    As the provider I attest to compliance with applicable laws and regulations related to telemedicine.          NUMBER OF PARTICIPANTS: 6          Data:    Session content: At the start of this group, patients were invited to check in by identifying themselves, describing their current emotional status, and identifying issues to address in this group.   Area(s) of treatment focus addressed in this session included Symptom Management, Personal Safety, Develop / Improve Independent Living Skills and Develop Socialization / Interpersonal Relationship Skills.  Jamison reported feeling down and frustrated.  He hoped depression would alleviate more with winning disability but it hasnt.  He got a new mattress and furniture and is hopeful for better  sleep.  He has not had any SI.  He reported surgery has been pushed off until January which has been frustrating and has caused pain.  He wants his life back but is struggling with low motivation and energy.  He reported ongoing negative self judgments. He agrees planning a trip to see his son might be helpful.      Therapeutic Interventions/Treatment Strategies:  Psychotherapist offered support, feedback and validation and reinforced use of skills. Treatment modalities used include Motivational Interviewing, Cognitive Behavioral Therapy and Dialectical Behavioral Therapy. Validated and normalized.  Highlighted and reinforced skills used; connected to positive outcomes.  Provided additional skill suggestions.  Aided in creating a plan to help work towards goals.        Assessment:    Patient response:   Patient responded to session by accepting feedback, giving feedback, listening, focusing on goals, being attentive and accepting support    Possible barriers to participation / learning include: and no barriers identified    Health Issues:   Yes: Pain, Associated Psychological Distress       Substance Use Review:   Substance Use: No active concerns identified.    Mental Status/Behavioral Observations  Appearance:   Appropriate   Eye Contact:   Good   Psychomotor Behavior: Normal   Attitude:   Cooperative   Orientation:   All  Speech   Rate / Production: Normal    Volume:  Normal   Mood:    Depressed   Affect:    Appropriate   Thought Content:   Rumination  Thought Form:  Coherent  Logical     Insight:    Good     Plan:     Safety Plan: No current safety concerns identified.  Recommended that patient call 911 or go to the local ED should there be a change in any of these risk factors.     Barriers to treatment: None identified    Patient Contracts (see media tab):  None    Substance Use: Not addressed in session     Continue or Discharge: Patient will continue in Adult Day Treatment (ADT)  as planned. Patient is  likely to benefit from learning and using skills as they work toward the goals identified in their treatment plan.      Venecia Quesada  November 11, 2021

## 2021-11-17 DIAGNOSIS — F17.200 TOBACCO USE DISORDER: ICD-10-CM

## 2021-11-18 ENCOUNTER — HOSPITAL ENCOUNTER (OUTPATIENT)
Dept: BEHAVIORAL HEALTH | Facility: CLINIC | Age: 56
End: 2021-11-18
Attending: PSYCHIATRY & NEUROLOGY
Payer: COMMERCIAL

## 2021-11-18 PROCEDURE — 90853 GROUP PSYCHOTHERAPY: CPT | Mod: GT,95

## 2021-11-18 NOTE — GROUP NOTE
Process Group Note    PATIENT'S NAME: Jailene Breen  MRN:   [id number]  :   1965  ACCT. NUMBER: [id number]  DATE OF SERVICE: 21  START TIME:  9:00 AM  END TIME: 10:50 AM  FACILITATOR: Venecia Quesada  TOPIC:  Process Group    Diagnoses:  296.32 Major Depressive Disorder, recurrent, moderate  300.022 JEFF  300.01  Panic Disorder       [facility] Day Treatment  TRACK: clinic 2                                      Service Modality:  Video Visit     Telemedicine Visit: The patient's condition can be safely assessed and treated via synchronous audio and visual telemedicine encounter.      Reason for Telemedicine Visit:  covid 19    Originating Site (Patient Location): Patient's home    Distant Site (Provider Location): Provider Remote Setting- Home Office    Consent:  The patient/guardian has verbally consented to: the potential risks and benefits of telemedicine (video visit) versus in person care; bill my insurance or make self-payment for services provided; and responsibility for payment of non-covered services.     Patient would like the video invitation sent by:  My Chart    Mode of Communication:  Video Conference via Medical Zoom    As the provider I attest to compliance with applicable laws and regulations related to telemedicine.         NUMBER OF PARTICIPANTS: 4          Data:    Session content: At the start of this group, patients were invited to check in by identifying themselves, describing their current emotional status, and identifying issues to address in this group.   Area(s) of treatment focus addressed in this session included Symptom Management, Personal Safety, Develop / Improve Independent Living Skills and Develop Socialization / Interpersonal Relationship Skills.  Les reported mood feels in limbo.  He is not depressed but also does not feel like things make him happy.  He agrees he feels flat.  He reported sleep has been much better since getting a  new mattress.  He wants to get back into believing in himself.  He hopes to go back to school when ready.  He is looking forward to seeing son for the holidays.  He denied safety concerns.    Therapeutic Interventions/Treatment Strategies:  Psychotherapist offered support, feedback and validation and reinforced use of skills. Treatment modalities used include Motivational Interviewing, Cognitive Behavioral Therapy and Dialectical Behavioral Therapy. Validated and normalized.  Highlighted and reinforced skills used; connected to positive outcomes.  Provided additional skill suggestions.  Aided in creating a plan to help work towards goals.        Assessment:    Patient response:   Patient responded to session by accepting feedback, giving feedback, listening, focusing on goals, being attentive and accepting support    Possible barriers to participation / learning include: and no barriers identified    Health Issues:   None reported       Substance Use Review:   Substance Use: No active concerns identified.    Mental Status/Behavioral Observations  Appearance:   Appropriate   Eye Contact:   Good   Psychomotor Behavior: Normal   Attitude:   Cooperative   Orientation:   All  Speech   Rate / Production: Normal    Volume:  Normal   Mood:    Depressed   Affect:    Flat   Thought Content:   Rumination  Thought Form:  Coherent  Logical     Insight:    Good     Plan:     Safety Plan: No current safety concerns identified.  Recommended that patient call 911 or go to the local ED should there be a change in any of these risk factors.     Barriers to treatment: None identified    Patient Contracts (see media tab):  None    Substance Use: Not addressed in session     Continue or Discharge: Patient will continue in Adult Day Treatment (ADT)  as planned. Patient is likely to benefit from learning and using skills as they work toward the goals identified in their treatment plan.      Venecia Quesada  November 18, 2021

## 2021-11-22 NOTE — ADDENDUM NOTE
Encounter addended by: Padilla Grande MD on: 11/22/2021 8:33 AM   Actions taken: Clinical Note Signed

## 2021-11-22 NOTE — PROGRESS NOTES
Psychiatry Provider Staffing Note    Met today with treatment team to discuss case, progress.    Jailene Breen is a 56 year old male enrolled in Adult Day Treatment clinic 2.      Medications:   Current Outpatient Medications   Medication     acetaminophen (TYLENOL) 500 MG tablet     buprenorphine HCl-naloxone HCl (SUBOXONE) 4-1 MG per film     chlorzoxazone (PARAFON FORTE) 500 MG tablet     etanercept (ENBREL SURECLICK) 50 MG/ML autoinjector     fish oil-omega-3 fatty acids 1000 MG capsule     FLUoxetine (PROZAC) 40 MG capsule     folic acid (FOLVITE) 1 MG tablet     liothyronine (CYTOMEL) 25 MCG tablet     medical cannabis (Patient's own supply)     methotrexate sodium 2.5 MG TABS     multivitamin (CENTRUM SILVER) tablet     nicotine polacrilex (NICORETTE) 4 MG gum     omeprazole (PRILOSEC) 20 MG DR capsule     QUEtiapine (SEROQUEL) 100 MG tablet     vitamin D2 (ERGOCALCIFEROL) 00894 units (1250 mcg) capsule     No current facility-administered medications for this encounter.         Diagnoses reviewed and include:  Patient Active Problem List   Diagnosis     CARDIOVASCULAR SCREENING; LDL GOAL LESS THAN 160     Obesity, Class I, BMI 30-34.9     Tear meniscus knee, right, initial encounter     Rheumatoid arthritis involving multiple sites, unspecified rheumatoid factor presence     Chronic pain     Right-sided thoracic back pain, unspecified chronicity     Generalized anxiety disorder     Panic attack     Ankylosing spondylitis (H)     Tobacco use disorder     Moderate major depression (H)     Immunocompromised (H)     Essential hypertension with goal blood pressure less than 140/90     Suicidal ideation     Insomnia due to other mental disorder     Major depressive disorder, recurrent episode, severe with mixed features (H)       Pertinent/updates:    MDD  panic    Care team notes and discussion:    Just got on disability    No longer in the same level of financial distress    Still symptomatic    Discharging  soon      Patient continues to meet criteria for program.  Continue plan of care.    Padilla Grande MD on 11/22/2021 at 8:32 AM

## 2021-11-29 NOTE — PATIENT INSTRUCTIONS
Nice to see you today Les. As we discussed:   - look on [employer] to find a therapist in your area who does either CBT or ACT (acceptance and commitment therapy).    **For crisis resources, please see the information at the end of this document**     Patient Education      Thank you for coming to the [facility] & [facility] [place] [facility].    Lab Testing:  If you had lab testing today and your results are reassuring or normal they will be mailed to you or sent through [employer] within 7 days. If the lab tests need quick action we will call you with the results. The phone number we will call with results is # [phone] (home) . If this is not the best number please call our clinic and change the number.    Medication Refills:  If you need any refills please call your pharmacy and they will contact us. Our fax number for refills is [phone]. Please allow three business for refill processing. If you need to  your refill at a new pharmacy, please contact the new pharmacy directly. The new pharmacy will help you get your medications transferred.     Scheduling:  If you have any concerns about today's visit or wish to schedule another appointment please call our office during normal business hours [phone] (8-5:00 M-F)    Contact Us:  Please call [phone] during business hours (8-5:00 M-F).  If after clinic hours, or on the weekend, please call  [phone].    Financial Assistance [phone]  [employer] Billing [phone]  Central Billing Office, ealth: [phone]  [facility] Billing [phone]  Medical Records [phone]  [facility] Patient Bill of Rights [email or web address]       MENTAL HEALTH CRISIS NUMBERS:  For a medical emergency please call  911 or go to the nearest ER.     [facility]:   [facility] -[phone]   Crisis Residence [facility]  -[phone]   Walk-[facility]ing Center [facility] -[phone]   COPE 24/7 [facility] Mobile Team -[phone] (adults)/[phone] (child)  CHILD: [facility] needs assessment team - [phone]      [facility]:   [facility] - [phone]   Walk-in counseling [facility] - [phone]   Walk-in counseling [facility] - [phone]   Crisis Residence [facility] Residence - [phone]  Urgent Care Adult Mental Ebpsqu-[phone] mobile unit/ 24/7 crisis line    National Crisis Numbers:   National Suicide Prevention Lifeline: 6-479-322-TALK (034-297-5616)  Poison Control Center - 4-250-087-6031  [place]/resources for a list of additional resources (SOS)  Trans Lifeline a hotline for transgender people 1-750.917.3992  The [employer] a hotline for LGBT youth 9-960-391-4849  Crisis Text Line: For any crisis 24/7   To: 863971  see www.crisistextline.org  - IF MAKING A CALL FEELS TOO HARD, send a text!         Again thank you for choosing [facility] MENTAL HEALTH & ADDICTION [facility] and please let us know how we can best partner with you to improve you and your family's health.    You may be receiving a survey regarding this appointment. We would love to have your feedback, both positive and negative. The survey is done by an external company, so your answers are anonymous.

## 2021-11-29 NOTE — PROGRESS NOTES
"VIDEO VISIT  Jailene Breen is a 56 year old patient who is being evaluated via a billable video visit.      The patient has been notified of following:   \"We have found that certain health care needs can be provided without the need for an in-person physical exam. This service lets us provide the care you need with a video conversation. If a prescription is necessary we can send it directly to your pharmacy. If lab work is needed we can place an order for that and you can then stop by our lab to have the test done at a later time. Insurers are generally covering virtual visits as they would in-office visits so billing should not be different than normal.  If for some reason you do get billed incorrectly, you should contact the billing office to correct it and that number is in the AVS .    Patient has given verbal consent for video visit?: Yes   How would you like to obtain your AVS?: [facility]  AVS SmartPhrase [PsychAVS] has been placed in 'Patient Instructions': Yes      Video- Visit Details  Type of service:  video visit for medication management  Time of service:    Date:  11/30/2021    Video Start Time: 10:04 AM                 Video End Time: 10:39 AM    Reason for video visit:  Patient unable to travel due to Covid-19  Originating Site (patient location):  [facility]   Location- Patient's home  Distant Site (provider location):  Remote location  Mode of Communication:  Video Conference via AmWell  Consent:  Patient has given verbal consent for video visit?: Yes        [facility]  Psychiatry Clinic  PSYCHIATRY PROGRESS NOTE     CARE TEAM:    PCP- Aiden Resendez   Rheumatology- Raghu Parada MD  [facility]- Susana GRANT; Santa Farrell PsyD    Jailene Breen is a 56 year old patient who prefers the name Les and uses pronouns he, him, his.   PERTINENT BACKGROUND                                 [most recent eval 09/21/20]   Psych " "pertinent item history includes suicidal ideation, SIB [cutting], mutiple psychotropic trials , severe med reaction, psych hosp (<3), SUBSTANCE USE: alcohol, substance use treatment  and Major Medical Problems (Rheumatoid Arthritis and Ankylosing Spondylitis)    DIAGNOSES                       Major Depressive Disorder, recurrent, severe, without psychotic features  Generalized Anxiety Disorder, with panic    Chronic Pain  Ankylosing Spondylitis    ASSESSMENT                       Today, reports interim stability of depression and resolution of suicidal ideation. Was granted disability, decreased financial stressors, and expected depression to improve/resolve however that did not happen. He's now struggling to figure out \"what to do\" with his life, has not started seeing an individual therapist. Recommended follow up with psychologytoday to find a local therapist, may also be appropriate for psychodynamic psychotherapy. Does not have desire for medication change today, due to upcoming surgery and potential changes then, will plan to follow-up following surgery. No safety concerns. Refills provided.     [place] was [place] today:  Indicates no medication misuse .    PLAN                                                                                   1) Medications:  -fluoxetine 40mg daily   -quetiapine 25-50mg as needed for anxiety and panic  -discontinued quetiapine 150mg at bedtime (not filled by patient for 2-3 weeks)  -liothyronine 25mcg daily    2) Therapy- Participating in group therapy; working on setting up individual and DBT    3) Next Due   Labs- AP monitoring labs due 9/2022  EKG- as needed  Rating scales- serial PHQ9s    4) Referrals  Therapy- CBT, ACT, or psychodynamic recommended    5) RTC: 6-8 weeks, following surgery    6) Crisis Numbers:   Provided routinely in AVS     After hours:  [phone]    INTERIM HISTORY        The patient reports good treatment adherence.  History was provided by the " "patient who was a good historian.     Since the last visit:   -\"Medications all mixed up right now.\"  -Been a couple weeks since he ran out. Only taking \"the litho and Flexeril\" and \"low dose of (quetiapine).\" \"I don't feel all that different\" so \"not sure if I should start them back up.\"  Taking medications listed below:  -quetiapine 25mg prn for anxiety \"every couple days\", which \"hasn't been that bad recently\"  -liothyronine 25mcg  -fluoxetine 40mg - just ran out yesterday, but picking up medication today.  -Was off suboxone \"for a while\" but now back on it, as they have rescheduled his surgery.  -Recently \"won\" his disability case, which \"has been a couple year cruz.\"  -Hoping that when he won the settlement \"my depression would go away.\"  -\"I feel really different\" since it happened. \"Because nothing good usually happens to me.\" \"Doesn't feel real.\"  -Doesn't have \"financial worries as before.\"  -But, \"now I guess I have to figure out what to do with the rest of my life.\"  -\"It's like I'm still waiting for something.\"  -Would like to wait until after surgery, because he's going to have to make several changes to his regimen \"and that always throws things off.\"    RECENT PSYCH ROS:   Depression: depressed mood, low energy and indecisiveness   Elevated:  none  Psychosis:  none  Anxiety:  as above  Trauma Related:  none  Dysregulation:  none  Eating Disorder: no  Other: no    Adverse Effects:  denies  Pertinent Negative Symptoms: No suicidal ideation, violent ideation, aggression, psychosis, hallucinations, delusions, otto and hypomania    Recent Substance Use:     Alcohol- none since 2004, used to attend AA meetings prior to COVID.  Tobacco- denies  Caffeine- yes  Cannabis- medical cannabis  Opioids- as prescribed           Narcan Kit- prescribed   Other illicit drugs- none    FAMILY and SOCIAL HISTORY                                      [per patient report]            Family Hx:  Mom - depression (since 2004 " "when sister )    Social Hx:  Financial/ Work- currently attending college at [employer], is hoping to start school at [employer] for a degree in psychology after being unable to work due to diagnosis of rheumatoid arthritis and ankylosing spondylitis  Partner/ -  in .  Children- 25 and 27 year old kids      Living situation- [place], house with wife and 2 kids      Social/ Spiritual Support- Talks to  every two weeks for lunch, but \"doesn't really have friends\". Family is supportive.      PSYCH and SUBSTANCE USE Critical Summary Points since  - started escitalopram  November - increased escitalopram 20mg daily  February - stopped hydroxyzine; started trazodone 50mg at bedtime and 25mg daily as needed for anxiety   - Patient hospitalized started on Abilify, titrated to 5mg daily; escitalopram switched to fluoxetine 40mg daily; started prazosin 1mg at bedtime. Pain management switched oxycodone to Suboxone and started medical marijuana.  May - patient still taking hydroxyzine, discontinued today.   - patient hospitalized (6/15/21) discontinued Abilify and trazodone, started on quetiapine & titrated to 100mg at bedtime and 25mg as needed for sleep.   August - increase quetiapine 150mg   November - discontinued quetiapine 150mg (patient ran out of medications and did not take for several weeks).    MEDICAL HISTORY  and ALLERGY     ALLERGIES: Mirtazapine, Hydrocodone, Ms contin [morphine], and Remeron soltab     Patient Active Problem List   Diagnosis     CARDIOVASCULAR SCREENING; LDL GOAL LESS THAN 160     Obesity, Class I, BMI 30-34.9     Tear meniscus knee, right, initial encounter     Rheumatoid arthritis involving multiple sites, unspecified rheumatoid factor presence     Chronic pain     Right-sided thoracic back pain, unspecified chronicity     Generalized anxiety disorder     Panic attack     Ankylosing spondylitis (H)     Tobacco use disorder "     Moderate major depression (H)     Immunocompromised (H)     Essential hypertension with goal blood pressure less than 140/90     Suicidal ideation     Insomnia due to other mental disorder     Major depressive disorder, recurrent episode, severe with mixed features (H)         MEDICAL REVIEW OF SYSTEMS     Contraception- none  A comprehensive review of systems was performed and is negative other than noted in the HPI.    MEDICATIONS          Current Outpatient Medications   Medication Sig Dispense Refill     acetaminophen (TYLENOL) 500 MG tablet Take 1,000 mg by mouth every 6 hours as needed for mild pain (headache)       buprenorphine HCl-naloxone HCl (SUBOXONE) 4-1 MG per film May use 2/0.5 (one-half film) under the tongue every 6 hours for pain. Max of 2 full films per day. Fill and begin 10/20/2021. 15 day script 30 each 0     etanercept (ENBREL SURECLICK) 50 MG/ML autoinjector Inject 50 mg Subcutaneous once a week 1 mL 3     FLUoxetine (PROZAC) 40 MG capsule Take 1 capsule (40 mg) by mouth daily 30 capsule 1     folic acid (FOLVITE) 1 MG tablet Take 5 tablets (5 mg) by mouth daily 150 tablet 3     liothyronine (CYTOMEL) 25 MCG tablet Take 1 tablet (25 mcg) by mouth daily 30 tablet 1     medical cannabis (Patient's own supply) See Admin Instructions (The purpose of this order is to document that the patient reports taking medical cannabis.  This is not a prescription, and is not used to certify that the patient has a qualifying medical condition.)     Pills PRN   Lozenges PRN       methotrexate sodium 2.5 MG TABS TAKE 4 TABLETS BY MOUTH ONCE A WEEK 48 tablet 0     nicotine polacrilex (NICORETTE) 4 MG gum PLACE AND PARK 1 PIECE INSIDE CHEEK AS NEEDED FOR SMOKING CESSATION 200 each 1     QUEtiapine (SEROQUEL) 100 MG tablet Take 1.5 tablets (150 mg) by mouth At Bedtime 45 tablet 1     VITALS                     There were no vitals taken for this visit.   MENTAL STATUS EXAM         Alertness: alert  and  "oriented  Appearance: adequately groomed  Behavior/Demeanor: cooperative, with good  eye contact   Speech: regular rate and rhythm  Language: intact  Psychomotor: normal or unremarkable  Mood: \"really different\"   Affect: appropriate; congruent to: mood- yes, content- yes  Thought Process/Associations: unremarkable  Thought Content:  Reports none;  Denies suicidal ideation, violent ideation and delusions   Perception:  Reports none;  Denies auditory hallucinations and visual hallucinations  Insight: good  Judgment: good  Cognition: (6) oriented: time, person, and place  attention span: intact  concentration: intact  recent memory: intact  remote memory: intact  fund of knowledge: appropriate  Gait and Station: N/A (telehealth)    LABS and DATA     PHQ9 TODAY = 18  PHQ 8/2/2021 9/14/2021 11/30/2021   PHQ-9 Total Score 19 18 18   Q9: Thoughts of better off dead/self-harm past 2 weeks More than half the days Several days Several days   F/U: Thoughts of suicide or self-harm - Yes No   F/U: Self harm-plan - No -   F/U: Self-harm action - No -   F/U: Safety concerns - No No       Recent Labs   Lab Test 10/11/21  1123 06/16/21  0741   CR 0.91 1.09   GFRESTIMATED >90 77     Recent Labs   Lab Test 10/11/21  1123 06/16/21  0741   AST 37 23   ALT 47 33   ALKPHOS 102 88     Recent Labs   Lab Test 10/11/21  1123 06/16/21  0741   GLC 97 92   A1C 5.2  --      Recent Labs   Lab Test 10/11/21  1123   CHOL 209*   TRIG 225*   *   HDL 30*     Recent Labs   Lab Test 10/11/21  1123 06/16/21  0741   AST 37 23   ALT 47 33   ALKPHOS 102 88     Recent Labs   Lab Test 10/11/21  1123 06/16/21  0741 03/21/21  0808 03/17/21  0748   WBC 7.0 6.1 6.8 8.3   ANEU  --   --  3.7 4.4   HGB 16.2 14.6 15.6 15.0    166 246 211     PSYCHOTROPIC DRUG INTERACTIONS   Increased risk of QTc prolongation: fluoxetine, quetiapine, Suboxone  Increased risk of serotonin syndrome: fluoxetine, Suboxone  Increased risk of CNS/respiratory depression: " Suboxone, quetiapine    MANAGEMENT:  Monitoring for adverse effects and patient is aware of risks    RISK STATEMENT for SAFETY     Today, Jamison Breen does not report any suicidal ideation and does not appear to be an imminent risk to self or others.    SUICIDE RISK ASSESSMENT-  Risk factors for self-harm: previous suicide attempt, recent psych inpt , financial/legal stress, significant pain and takes opiates.  Mitigating factors: h/o seeking help , commitment to family and participation in DBT or day program.  The patient does not appear to be at imminent risk for self-harm, hospitalization is recommended which the pt does not agree to. No hospitalization will be arranged. Safety plan reviewed today including contacting family, friends (), day treatment team, clinic, or crisis line.    TREATMENT RISK STATEMENT:  The risks, benefits, alternatives and potential adverse effects have been discussed and are understood by the pt. The pt understands the risks of using street drugs or alcohol. There are no medical contraindications, the pt agrees to treatment with the ability to do so. The pt knows to call the clinic for any problems or to access emergency care if needed.  Medical and substance use concerns are documented above.  Psychotropic drug interaction check was done, including changes made today.    PROVIDER: Slim Sage MD    Patient was not staffed.  Supervisor is Dr. Downing who will sign the note.

## 2021-11-30 ENCOUNTER — VIRTUAL VISIT (OUTPATIENT)
Dept: PSYCHIATRY | Facility: CLINIC | Age: 56
End: 2021-11-30
Attending: PSYCHIATRY & NEUROLOGY
Payer: COMMERCIAL

## 2021-11-30 DIAGNOSIS — F32.A DEPRESSION, UNSPECIFIED DEPRESSION TYPE: ICD-10-CM

## 2021-11-30 DIAGNOSIS — F41.9 ANXIETY: ICD-10-CM

## 2021-11-30 PROCEDURE — 99214 OFFICE O/P EST MOD 30 MIN: CPT | Mod: 95 | Performed by: STUDENT IN AN ORGANIZED HEALTH CARE EDUCATION/TRAINING PROGRAM

## 2021-11-30 RX ORDER — QUETIAPINE FUMARATE 25 MG/1
25-50 TABLET, FILM COATED ORAL 2 TIMES DAILY PRN
Qty: 60 TABLET | Refills: 1 | Status: SHIPPED | OUTPATIENT
Start: 2021-11-30 | End: 2022-01-19

## 2021-11-30 RX ORDER — FLUOXETINE 40 MG/1
40 CAPSULE ORAL DAILY
Qty: 30 CAPSULE | Refills: 2 | Status: SHIPPED | OUTPATIENT
Start: 2021-11-30 | End: 2022-01-19

## 2021-11-30 RX ORDER — LIOTHYRONINE SODIUM 25 UG/1
25 TABLET ORAL DAILY
Qty: 30 TABLET | Refills: 2 | Status: SHIPPED | OUTPATIENT
Start: 2021-11-30 | End: 2022-01-19

## 2021-11-30 ASSESSMENT — PATIENT HEALTH QUESTIONNAIRE - PHQ9
SUM OF ALL RESPONSES TO PHQ QUESTIONS 1-9: 18
SUM OF ALL RESPONSES TO PHQ QUESTIONS 1-9: 18
10. IF YOU CHECKED OFF ANY PROBLEMS, HOW DIFFICULT HAVE THESE PROBLEMS MADE IT FOR YOU TO DO YOUR WORK, TAKE CARE OF THINGS AT HOME, OR GET ALONG WITH OTHER PEOPLE: VERY DIFFICULT

## 2021-12-01 ASSESSMENT — PATIENT HEALTH QUESTIONNAIRE - PHQ9: SUM OF ALL RESPONSES TO PHQ QUESTIONS 1-9: 18

## 2021-12-02 ENCOUNTER — HOSPITAL ENCOUNTER (OUTPATIENT)
Dept: BEHAVIORAL HEALTH | Facility: CLINIC | Age: 56
End: 2021-12-02
Attending: PSYCHIATRY & NEUROLOGY
Payer: COMMERCIAL

## 2021-12-02 ENCOUNTER — MYC MEDICAL ADVICE (OUTPATIENT)
Dept: PALLIATIVE MEDICINE | Facility: CLINIC | Age: 56
End: 2021-12-02
Payer: COMMERCIAL

## 2021-12-02 DIAGNOSIS — M45.7 ANKYLOSING SPONDYLITIS OF LUMBOSACRAL REGION (H): ICD-10-CM

## 2021-12-02 DIAGNOSIS — M54.59 LUMBAR FACET JOINT PAIN: ICD-10-CM

## 2021-12-02 DIAGNOSIS — M54.41 CHRONIC BILATERAL LOW BACK PAIN WITH RIGHT-SIDED SCIATICA: ICD-10-CM

## 2021-12-02 DIAGNOSIS — F11.20 UNCOMPLICATED OPIOID DEPENDENCE (H): ICD-10-CM

## 2021-12-02 DIAGNOSIS — G89.29 CHRONIC BILATERAL LOW BACK PAIN WITH RIGHT-SIDED SCIATICA: ICD-10-CM

## 2021-12-02 DIAGNOSIS — M25.551 HIP PAIN, RIGHT: ICD-10-CM

## 2021-12-02 DIAGNOSIS — M05.79 RHEUMATOID ARTHRITIS INVOLVING MULTIPLE SITES WITH POSITIVE RHEUMATOID FACTOR (H): ICD-10-CM

## 2021-12-02 DIAGNOSIS — M51.369 DDD (DEGENERATIVE DISC DISEASE), LUMBAR: ICD-10-CM

## 2021-12-02 PROCEDURE — 90853 GROUP PSYCHOTHERAPY: CPT | Mod: GT,95

## 2021-12-02 NOTE — GROUP NOTE
Process Group Note    PATIENT'S NAME: Jailene Breen  MRN:   [id number]  :   1965  ACCT. NUMBER: [id number]  DATE OF SERVICE: 21  START TIME:  9:00 AM  END TIME: 10:50 AM  FACILITATOR: Venecia Quesada  TOPIC: MH Process Group    Diagnoses:  296.32 Major Depressive Disorder, recurrent, moderate  300.022 JEFF  300.01  Panic Disorder       [facility] Day Treatment  TRACK: Clinic 2                                      Service Modality:  Video Visit     Telemedicine Visit: The patient's condition can be safely assessed and treated via synchronous audio and visual telemedicine encounter.      Reason for Telemedicine Visit: Services only offered telehealth    Originating Site (Patient Location): Patient's home    Distant Site (Provider Location): Provider Remote Setting- Home Office    Consent:  The patient/guardian has verbally consented to: the potential risks and benefits of telemedicine (video visit) versus in person care; bill my insurance or make self-payment for services provided; and responsibility for payment of non-covered services.     Patient would like the video invitation sent by:  My Chart    Mode of Communication:  Video Conference via Medical Zoom    As the provider I attest to compliance with applicable laws and regulations related to telemedicine.          NUMBER OF PARTICIPANTS: 4          Data:    Session content: At the start of this group, patients were invited to check in by identifying themselves, describing their current emotional status, and identifying issues to address in this group.   Area(s) of treatment focus addressed in this session included Symptom Management, Personal Safety, Develop / Improve Independent Living Skills and Develop Socialization / Interpersonal Relationship Skills.  Jamison reported feel okay but sad about discharging today.  He reported conflict with his family and talking about it with brother was triggering.  He reported  frustration his mom thinks he does not want to work now that he is on disability.  He reported his biological dad has not responded to messages.  He reported frustration that son goes to holiday's at girlfriends but she doesn't come here.  He worries about how this will play out in the future. He reported SI is present but less than in the past.  He is able to commit to safety.    Therapeutic Interventions/Treatment Strategies:  Psychotherapist offered support, feedback and validation and reinforced use of skills. Treatment modalities used include Motivational Interviewing, Cognitive Behavioral Therapy and Dialectical Behavioral Therapy. Validated and normalized.  Highlighted and reinforced skills used; connected to positive outcomes.  Provided additional skill suggestions.  Aided in creating a plan to help work towards goals.        Assessment:    Patient response:   Patient responded to session by accepting feedback, giving feedback, listening, focusing on goals, being attentive and accepting support    Possible barriers to participation / learning include: and no barriers identified    Health Issues:   Yes: Pain, Associated Psychological Distress       Substance Use Review:   Substance Use: No active concerns identified.    Mental Status/Behavioral Observations  Appearance:   Appropriate   Eye Contact:   Good   Psychomotor Behavior: Normal   Attitude:   Cooperative   Orientation:   All  Speech   Rate / Production: Talkative   Volume:  Normal   Mood:    Angry  Anxious  Depressed   Affect:    Appropriate   Thought Content:   Rumination and Safety reports  presence of suicidal ideation passive suicidal ideation   Thought Form:  Coherent  Tangential     Insight:    Good     Plan:     Safety Plan: Committed to safety and agreed to follow previously developed safety coping plan.      Barriers to treatment: None identified    Patient Contracts (see media tab):  None    Substance Use: Not addressed in session      Continue or Discharge: Patient will continue in Adult Day Treatment (ADT)  as planned. Patient is likely to benefit from learning and using skills as they work toward the goals identified in their treatment plan.      Venecia Quesada  December 2, 2021

## 2021-12-02 NOTE — TELEPHONE ENCOUNTER
Received call from patient requesting refill(s) of buprenorphine HCl-naloxone HCl (SUBOXONE) 4-1 MG per film    Last dispensed from pharmacy on 10/20/21    Patient's last office/virtual visit by prescribing provider on 10/20/21  Next office/virtual appointment scheduled for : none    Last urine drug screen date 06/15/21  Current opioid agreement on file (completed within the last year) No Date of opioid agreement: 01/21/20    E-prescribe to pharmacy[id number] - [place] - [address]     Will route to nursing Panther Burn for review and preparation of prescription(s).

## 2021-12-02 NOTE — TELEPHONE ENCOUNTER
To: PAIN NURSE      From: Jamison Breen      Created: 12/2/2021 10:52 AM        *-*-*This message has not been handled.*-*-*    Hello, could you please refill the suboxone and send to [facility] in [place].  I'm still waiting to have surgery, it's rescheduled due to covid on 1-7-22. Thank you and have a good holiday season.           Routing refill request for Suboxone to team for processing to [facility] pharmacy    Katalina Kan RN, BSN, CMSRN  RN Care Coordinator  [facility]

## 2021-12-03 RX ORDER — BUPRENORPHINE AND NALOXONE 8; 2 MG/1; MG/1
0.5 FILM, SOLUBLE BUCCAL; SUBLINGUAL 2 TIMES DAILY
Qty: 30 EACH | Refills: 0 | Status: SHIPPED | OUTPATIENT
Start: 2021-12-03 | End: 2022-01-06

## 2021-12-03 RX ORDER — BUPRENORPHINE AND NALOXONE 4; 1 MG/1; MG/1
FILM, SOLUBLE BUCCAL; SUBLINGUAL
Qty: 30 EACH | Refills: 0 | Status: CANCELLED | OUTPATIENT
Start: 2021-12-03

## 2021-12-03 NOTE — TELEPHONE ENCOUNTER
Per patient myChart message:  From: Jamison Breen      Created: 12/3/2021 10:13 AM      I had to stop for surgery before and it mess8 everything up. I still have 2 doses left.          Routed to provider.     Cha RN-BSN  [facility]

## 2021-12-03 NOTE — TELEPHONE ENCOUNTER
Signed Prescriptions:                        Disp   Refills    buprenorphine HCl-naloxone HCl (SUBOXONE) *30 each0        Sig: Place 0.5 Film under the tongue 2 times daily Max 1           film per day. Fill/begin 12/3/2021 to last 30           days for chronic pain  Authorizing Provider: SUSANA PETTY    Reviewed [place]  December 3, 2021- no concerning fills.    Susana GRNAT RN CNP, JAQUELINP  [facility]  [facility]      Hi Les-  Thanks for clarification.      I sent in script for Suboxone 8/2mg films. You will use 0.5 (one-half film = 4mg/1mg) twice daily, the same as you are currently doing.      Script sent to [facility] in [place]. You may pick it up and start the script today 12/3/2021.      Thanks. Let us know 7 days in advance next time to help ensure pharmacy has stock, etc to avoid any risk of break in your medication.      Susana GRANT RN CNP, JQAUELINP  [facility]  [facility]    I have sent the above Fly Victort message to the patient.     Susana GRANT RN CNP, JAQUELINP  [facility]

## 2021-12-03 NOTE — TELEPHONE ENCOUNTER
Les-     My last Suboxone script was written on 10/20/2021 and was for #30 films to last 15 days. How do you have 2 doses left?   How have you been using the Suboxone since October?   Your pain will be managed the best if you CONTINUE the Suboxone throughout your surgical time. I have discussed this with 2 physicians who manage Suboxone in addiction medicine practice and they do not recommend that their patients stop Suboxone for surgery.      I need more details before I send in a refill.      Susana GRANT RN CNP, JAQUELINP  [facility]  [facility]    I have sent the above Archetypest message to the patient.     Susana GRANT RN CNP, XUAN  [facility]  [facility]

## 2021-12-03 NOTE — TELEPHONE ENCOUNTER
Below from pt  I actually still had some of the 8m from not using because of surgery. that I cut up. I take 4m twice a day

## 2021-12-03 NOTE — TELEPHONE ENCOUNTER
Medication refill information reviewed.     Last due:  Fill and begin 10/20/2021. 15 day script  Due date:  Past due: 11/4/21  _________________________________    Mychart sent to pt:  From: Cha Molina RN      Created: 12/3/2021 9:03 AM      Hi Les,  I hope all is well w/ you!     We had you due for a refill on 11/4/21.  Have you been out for a while?  If so when was your last dose? Or are you taking this differently?     Kind regards,     SANDRA Eubanks-BSN  [facility]

## 2021-12-13 ENCOUNTER — VIRTUAL VISIT (OUTPATIENT)
Dept: PSYCHIATRY | Facility: CLINIC | Age: 56
End: 2021-12-13
Attending: PSYCHOLOGIST
Payer: COMMERCIAL

## 2021-12-13 DIAGNOSIS — F41.9 ANXIETY: Primary | ICD-10-CM

## 2021-12-13 DIAGNOSIS — F33.1 MAJOR DEPRESSIVE DISORDER, RECURRENT EPISODE, MODERATE (H): ICD-10-CM

## 2021-12-13 PROCEDURE — 90837 PSYTX W PT 60 MINUTES: CPT | Mod: U7 | Performed by: STUDENT IN AN ORGANIZED HEALTH CARE EDUCATION/TRAINING PROGRAM
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
## 2021-12-20 ENCOUNTER — VIRTUAL VISIT (OUTPATIENT)
Dept: PSYCHIATRY | Facility: CLINIC | Age: 56
End: 2021-12-20
Attending: PSYCHOLOGIST
Payer: COMMERCIAL

## 2021-12-20 DIAGNOSIS — F33.1 MAJOR DEPRESSIVE DISORDER, RECURRENT EPISODE, MODERATE (H): Primary | ICD-10-CM

## 2021-12-20 DIAGNOSIS — F41.9 ANXIETY: ICD-10-CM

## 2021-12-20 PROCEDURE — 90837 PSYTX W PT 60 MINUTES: CPT | Mod: 95 | Performed by: STUDENT IN AN ORGANIZED HEALTH CARE EDUCATION/TRAINING PROGRAM

## 2021-12-20 NOTE — PROGRESS NOTES
"Clinician Contact & Progress Note  Department of Psychiatry & Behavioral Sciences  [facility]    Patient: Jailene Breen (1965)     MRN: [id number]  Date of Treatment:  Dec 20, 2021  Duration of Treatment: Start Time: 9:00 AM; End Time: 9:55 AM  Provider: Franklin Breen MD     People present:   Provider: Franklin Cherry  Patient: Jailene Breen    Encounter Diagnoses   Name Primary?     Major depressive disorder, recurrent episode, moderate (H) Yes     Anxiety        Assessment (current symptoms):    PHQ 1/3/2022 1/10/2022 1/24/2022   PHQ-9 Total Score 13 15 15   Q9: Thoughts of better off dead/self-harm past 2 weeks Not at all Not at all Several days   F/U: Thoughts of suicide or self-harm - - Yes   F/U: Self harm-plan - - No   F/U: Self-harm action - - No   F/U: Safety concerns - - No     JEFF-7 SCORE 8/2/2021 1/24/2022 1/24/2022   Total Score - - -   Total Score - - 9 (mild anxiety)   Total Score 9 9 9       Session Content:     - Lives in [facility] [place], lives with wife and daughter (26). Son (27) moved out to [place] with girlfriend.   - Kids stayed in the house through college, then continued that living situation. They were paying rent, told them they could stay with parents until they found SO.   - Used to work as a , then  before AS developed    - Discussed how he got kicked out of house at age 20 (had to live in his car, go to food shelves, had to beg for gas), and how he now lets his kids stay in the house indefinitely. Wants to make it as easy as possible for kids, leave when they are comfortable.   - Reports he got out of the homelessness via a [employer] group, got a motel room, job, then his own place. 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   - Reports eventually he was on so many pain and psych meds he \"blacked out\", lost a $20,000 [employer] bag, made a phone call threatening to kill himself " "and sagar his car into the store. Lasted for about a week, so he lost that convenience store job.   - Attributes the blackout to an abrupt change in anti-depressant as a covering psychiatrist didn't like prozac due to \"impotence\".    - Reports he did not take the [employer] money but \"needed to say I did to get a plea deal\", was put on probation, reports law enforcement checked his home, bank account, etc. Thinks an employee may have taken it. Says he took responsibility for the loss, still feels guilty about it.   - He was out of work for a while and couldn't go back into that industry due to the criminal charge on his record, started drinking more, caused a troublesome time in his life, ended up going to [employer].    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 hit. After losing job due to medical condition tried to kill himself (2016) in an alcohol relapse.   - Says he wants to bury that period of his life, but this was reframed as a success story due to crawling out of that low period, going to treatment and having it stick, getting a job again, etc.     - Then dug himself out of another low point due to deciding to go back to college. Getting 3.8 GPA but stopped going the last few years due to \"depression\". Planning to hold off until next fall.   - Says he picked psychology as he wanted to pick a degree to help others, especially other alcoholics, \"I have a story to share, I'm still here and trying to push forward.\". \"Even if I can't help others the degree would probably help me out\".   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 Has some shame around being on these medications. Says he never liked taking pills, only thing he ever liked was drinking.   - Reports back in August he was switched from opioid pills to suboxone due to suicide risk. Says he did not like being on pills but did not like being taken off of them. Felt \"accused\" by being switched to suboxone.   -  " "Future goal is to get surgery to not need suboxone, needs to washout suboxone prior to surgery    - Says a strength of his is getting out of bad situations  - Explored how to get himself out of this current situation    Treatment Plan:   1. Depression- be more active (doing things around the house), more motivation, confidence in self, go back to school (degree in psychology)    2. Relationship with bio parents- have a conversation with mom without fighting, give her a hug. Have deeper conversations with bio dad, feel more like a son.     3. Feelings of worthlessness, guilt, self-blame- Less shame, more future oriented    Mental Status Exam:  Alertness: alert  and oriented  Appearance: well groomed  Behavior/Demeanor: cooperative, pleasant and calm, with good  eye contact   Speech: normal  Language: intact. Preferred language identified as English.  Psychomotor: normal or unremarkable  Mood: depressed and anxious  Affect: blunted and appropriate; was congruent to mood; was congruent to content  Thought Process/Associations: unremarkable  Thought Content:  Reports none;  Denies suicidal and violent ideation and delusions  -Suicidal ideation: denies SI, denies intent, and denies plan  -Homicidal Ideation: denies  Perception: Reports none;  Denies auditory hallucinations and visual hallucinations; intact    Insight: good  Judgment: good  Cognition: does  appear grossly intact    Billing for \"Interactive Complexity\"?    No    Franklin Breen MD         "

## 2021-12-27 ENCOUNTER — TELEPHONE (OUTPATIENT)
Dept: SURGERY | Facility: CLINIC | Age: 56
End: 2021-12-27

## 2021-12-27 DIAGNOSIS — Z01.812 ENCOUNTER FOR PREOPERATIVE SCREENING LABORATORY TESTING FOR COVID-19 VIRUS: Primary | ICD-10-CM

## 2021-12-27 DIAGNOSIS — Z11.52 ENCOUNTER FOR PREOPERATIVE SCREENING LABORATORY TESTING FOR COVID-19 VIRUS: Primary | ICD-10-CM

## 2021-12-27 NOTE — TELEPHONE ENCOUNTER
[facility] Call Center    Phone Message    May a detailed message be left on voicemail: yes     Reason for Call: Order(s): Other: Pre-procedure covid test  Reason for requested: 1/7/22 procedure  Date needed: ASAP  Provider name: Martina    Action Taken: Message routed to:  Adult Clinics: Surgery, General p [id number]    Travel Screening: Not Applicable

## 2021-12-28 DIAGNOSIS — F17.200 TOBACCO USE DISORDER: ICD-10-CM

## 2021-12-30 ENCOUNTER — OFFICE VISIT (OUTPATIENT)
Dept: FAMILY MEDICINE | Facility: CLINIC | Age: 56
End: 2021-12-30
Payer: COMMERCIAL

## 2021-12-30 VITALS
DIASTOLIC BLOOD PRESSURE: 85 MMHG | WEIGHT: 271.13 LBS | BODY MASS INDEX: 33 KG/M2 | HEART RATE: 60 BPM | SYSTOLIC BLOOD PRESSURE: 133 MMHG | TEMPERATURE: 97 F

## 2021-12-30 DIAGNOSIS — Z01.818 PREOP GENERAL PHYSICAL EXAM: Primary | ICD-10-CM

## 2021-12-30 DIAGNOSIS — K44.9 HIATAL HERNIA: ICD-10-CM

## 2021-12-30 PROCEDURE — 99214 OFFICE O/P EST MOD 30 MIN: CPT | Performed by: FAMILY MEDICINE

## 2021-12-30 NOTE — PATIENT INSTRUCTIONS
Wean down on suboxone as able    No meds the am of procedure    No aspirin or ibuprofen type meds the week before procedure    Tylenol ( acetaminophen ) okay

## 2021-12-30 NOTE — PROGRESS NOTES
[facility]  [address]  [place] [address]  Phone: [phone]  Primary Provider: Aiden Resendez  Pre-op Performing Provider: SUSY SMITH       PREOPERATIVE EVALUATION:  Today's date: 12/30/2021    Jailene Breen is a 56 year old male who presents for a preoperative evaluation.    Surgical Information:   Surgery/Procedure: ROBOTIC XI ASSISTED LAPAROSCOPIC HERNIA REPAIR HIATAL PARTIAL FUNDOPLICATION POSSIBLE MESH    Surgery Location: [facility]  Surgeon: Martina  Surgery Date: 01/07/2021  Time of Surgery: am  Where patient plans to recover: At home with family  Fax number for surgical facility: [phone]    Type of Anesthesia Anticipated: General         Subjective     HPI related to upcoming procedure: 56 year old with long history of gerd, hiatal hernia.  To have above procedure.      Preop Questions 12/30/2021   1. Have you ever had a heart attack or stroke? No   2. Have you ever had surgery on your heart or blood vessels, such as a stent placement, a coronary artery bypass, or surgery on an artery in your head, neck, heart, or legs? No   3. Do you have chest pain with activity? No   4. Do you have a history of  heart failure? No   5. Do you currently have a cold, bronchitis or symptoms of other infection? No   6. Do you have a cough, shortness of breath, or wheezing? No   7. Do you or anyone in your family have previous history of blood clots? No   8. Do you or does anyone in your family have a serious bleeding problem such as prolonged bleeding following surgeries or cuts? No   9. Have you ever had problems with anemia or been told to take iron pills? No   10. Have you had any abnormal blood loss such as black, tarry or bloody stools? No   11. Have you ever had a blood transfusion? No   12. Are you willing to have a blood transfusion if it is medically needed before, during, or after your surgery? Yes   13. Have you or any of your  relatives ever had problems with anesthesia? No   14. Do you have sleep apnea, excessive snoring or daytime drowsiness? No   15. Do you have any artifical heart valves or other implanted medical devices like a pacemaker, defibrillator, or continuous glucose monitor? No   16. Do you have artificial joints? No   17. Are you allergic to latex? No       Health Care Directive:  Patient does not have a Health Care Directive or Living Will:     Preoperative Review of :  Patient on suboxone    [id number]}        Review of Systems  Constitutional, neuro, ENT, endocrine, pulmonary, cardiac, gastrointestinal, genitourinary, musculoskeletal, integument and psychiatric systems are negative, except as otherwise noted.    Patient has rheumatoid arthrtitis, on methotrexate.      On suboxone since early this year.    No recent illnesses.    Occasional nausea.    Rare vomiting.    No history of anesthesia problems.      No cardiac history or symptoms.     No bleeding or clotting problems.      Patient Active Problem List    Diagnosis Date Noted     Major depressive disorder, recurrent episode, severe with mixed features (H) 04/01/2021     Priority: Medium     Insomnia due to other mental disorder 03/19/2021     Priority: Medium     Suicidal ideation 03/16/2021     Priority: Medium     Immunocompromised (H) 01/13/2021     Priority: Medium     Moderate major depression (H) 10/06/2020     Priority: Medium     Tobacco use disorder 06/19/2017     Priority: Medium     Ankylosing spondylitis (H) 03/01/2017     Priority: Medium     Generalized anxiety disorder 10/31/2016     Priority: Medium     Panic attack 10/31/2016     Priority: Medium     Right-sided thoracic back pain, unspecified chronicity 10/10/2016     Priority: Medium     Tear meniscus knee, right, initial encounter 09/15/2016     Priority: Medium     Rheumatoid arthritis involving multiple sites, unspecified rheumatoid factor presence 09/15/2016     Priority: Medium     IMO  Regulatory Load OCT 2020       Essential hypertension with goal blood pressure less than 140/90 11/21/2013     Priority: Medium     CARDIOVASCULAR SCREENING; LDL GOAL LESS THAN 160 03/06/2012     Priority: Medium     Obesity, Class I, BMI 30-34.9 03/06/2012     Priority: Medium     Chronic pain 09/04/2011     Priority: Medium      Past Medical History:   Diagnosis Date     Ankylosing spondylitis (H) 03/01/2017     Anxiety      Arthritis     back, knees     Back pain 11/17/2013    possible drug seeking behavior in the past.     Gastroesophageal reflux disease      Hypertension      Overweight (BMI 25.0-29.9) 03/06/2012     Panic attacks      Syncope, unspecified syncope type 02/27/2017     Past Surgical History:   Procedure Laterality Date     ARTHROSCOPY KNEE Right 09/22/2016    Procedure: ARTHROSCOPY KNEE;  Surgeon: Fredo Hughes MD;  Location: [facility] OR     COMBINED ESOPHAGOSCOPY, GASTROSCOPY, DUODENOSCOPY (EGD) WITH CO2 INSUFFLATION N/A 01/19/2021    Procedure: ESOPHAGOGASTRODUODENOSCOPY, WITH CO2 INSUFFLATION;  Surgeon: Brandon Mack MD;  Location: [facility] OR     ESOPHAGOSCOPY, GASTROSCOPY, DUODENOSCOPY (EGD), COMBINED N/A 01/19/2021    Procedure: Esophagogastroduodenoscopy, With Biopsy;  Surgeon: Brandon Mack MD;  Location: [facility] OR     ESOPHAGOSCOPY, GASTROSCOPY, DUODENOSCOPY (EGD), COMBINED N/A 05/27/2021    Procedure: ESOPHAGOGASTRODUODENOSCOPY (EGD);  Surgeon: Chester Lynn MD;  Location: [facility]     INJECT PARAVERTEBRAL FACET JOINT LUMBAR / SACRAL FIRST Right 11/25/2016    Procedure: INJECT PARAVERTEBRAL FACET JOINT LUMBAR / SACRAL FIRST;  Surgeon: Doretha Magallanes MD;  Location:  OR     ORTHOPEDIC SURGERY Right     knee     PLANTAR FASCIA RELEASE Left      Current Outpatient Medications   Medication Sig Dispense Refill     acetaminophen (TYLENOL) 500 MG tablet Take 1,000 mg by mouth every 6 hours as needed for mild pain (headache)       buprenorphine HCl-naloxone HCl (SUBOXONE)  "4-1 MG per film May use 2/0.5 (one-half film) under the tongue every 6 hours for pain. Max of 2 full films per day. Fill and begin 10/20/2021. 15 day script 30 each 0     buprenorphine HCl-naloxone HCl (SUBOXONE) 8-2 MG per film Place 0.5 Film under the tongue 2 times daily Max 1 film per day. Fill/begin 12/3/2021 to last 30 days for chronic pain 30 each 0     etanercept (ENBREL SURECLICK) 50 MG/ML autoinjector Inject 50 mg Subcutaneous once a week 1 mL 3     FLUoxetine (PROZAC) 40 MG capsule Take 1 capsule (40 mg) by mouth daily 30 capsule 2     folic acid (FOLVITE) 1 MG tablet Take 5 tablets (5 mg) by mouth daily 150 tablet 3     liothyronine (CYTOMEL) 25 MCG tablet Take 1 tablet (25 mcg) by mouth daily 30 tablet 2     medical cannabis (Patient's own supply) See Admin Instructions (The purpose of this order is to document that the patient reports taking medical cannabis.  This is not a prescription, and is not used to certify that the patient has a qualifying medical condition.)     Pills PRN   Lozenges PRN       methotrexate sodium 2.5 MG TABS TAKE 4 TABLETS BY MOUTH ONCE A WEEK 48 tablet 0     nicotine polacrilex (NICORETTE) 4 MG gum PLACE AND PARK 1 PIECE INSIDE CHEEK AS NEEDED FOR SMOKING CESSATION 200 each 1     QUEtiapine (SEROQUEL) 25 MG tablet Take 1-2 tablets (25-50 mg) by mouth 2 times daily as needed (for severe anxiety/panic) 60 tablet 1       Allergies   Allergen Reactions     Mirtazapine      Other reaction(s): Coma     Hydrocodone Itching     Ms Contin [Morphine] Itching     Remeron Soltab Other (See Comments)     \"Blacked out\" for one week        Social History     Tobacco Use     Smoking status: Never Smoker     Smokeless tobacco: Former User     Types: Chew     Tobacco comment: still chews nicotine gum   Substance Use Topics     Alcohol use: No     Comment: none        History   Drug Use     Frequency: 7.0 times per week     Types: Marijuana     Comment: medicinal (oral)         Objective     BP " (!) 148/87 (BP Location: Right arm, Patient Position: Chair, Cuff Size: Adult Large)   Pulse 60   Temp 97  F (36.1  C) (Temporal)   Wt 123 kg (271 lb 2 oz)   BMI 33.00 kg/m      Physical Exam    GENERAL APPEARANCE: healthy, alert and no distress     EYES: EOMI,  PERRL     HENT: ear canals and TM's normal and nose and mouth without ulcers or lesions     NECK: no adenopathy, no asymmetry, masses, or scars and thyroid normal to palpation     RESP: lungs clear to auscultation - no rales, rhonchi or wheezes     CV: regular rates and rhythm, normal S1 S2, no S3 or S4 and no murmur, click or rub     ABDOMEN:  soft, nontender, no HSM or masses and bowel sounds normal     MS: extremities normal- no gross deformities noted, no evidence of inflammation in joints, FROM in all extremities.     SKIN: no suspicious lesions or rashes     NEURO: Normal strength and tone, sensory exam grossly normal, mentation intact and speech normal     PSYCH: mentation appears normal. and affect normal/bright     LYMPHATICS: No cervical adenopathy    Recent Labs   Lab Test 10/11/21  1123 06/16/21  0741   HGB 16.2 14.6    166    141   POTASSIUM 4.3 4.2   CR 0.91 1.09   A1C 5.2  --         Diagnostics:see ekg from earlier this year    See labs from October       ASSESSMENT / PLAN:  (Z01.818) Preop general physical exam  (primary encounter diagnosis)  Comment: patient to have esophagus/ hiatal hernia procedure.  This is the 3rd preop he has had as procedure had to be cancelled twice due to covid surge in hospital.    Plan: proceed with surgery.  No cardiac history or symptoms.     (K44.9) Hiatal hernia  Comment: as above   Plan: as above    Patient will try to wean the suboxone down prior to procedure.   Patient not on any blood thinners.    I reviewed the patient's medications, allergies, medical history, family history, and social history.    Audi Castellanos MD          Revised Cardiac Risk Index (RCRI):  The patient has the  following serious cardiovascular risks for perioperative complications:   - No serious cardiac risks = 0 points     RCRI Interpretation: 0 points: Class I (very low risk - 0.4% complication rate)           Signed Electronically by: Audi Castellanos MD  Copy of this evaluation report is provided to requesting physician.

## 2021-12-30 NOTE — NURSING NOTE
Faxed Preop to [facility] at [phone]. Put on [facility] Team TC desk awaiting fax confirmation. Please abstract after fax is verified sent.      Debra QUICK MA

## 2021-12-31 NOTE — TELEPHONE ENCOUNTER
Prescription approved per [facility] Refill Protocol.      Dia Hardin RN  [facility]

## 2022-01-01 ENCOUNTER — TELEPHONE (OUTPATIENT)
Dept: PSYCHIATRY | Facility: CLINIC | Age: 57
End: 2022-01-01
Payer: COMMERCIAL

## 2022-01-01 NOTE — TELEPHONE ENCOUNTER
"Telephone Call with Jamison Breen    Start time: 16:51  End time: 17:08    Notified by intake that Mr. Breen would like a call back regarding medications.    Patient called back and he relates a story about a \"weird dream\" he had yesterday. He was up late around midnight for the New Year, used his medical marijuana, and then had a meatball sandwich. He felt normal when he went to bed, but had a \"weird\" dream in which he \"felt really good\" and \"in total peace\". When he woke up he became shaky, dizzy, experienced stuttering, had some gastric symptoms, and overall became concerned he was having a heart attack. He called 911 and was taken to the ED at [facility], where the workup was negative.    Per chart review (Dr. Tejada, [facility] ED, 01/01/22):   \"ED Course, Impression and Plan:  Patient here with shakiness. His symptoms are nonspecific. I suspect that he had a component of anxiety causing his symptoms. IV was established and he was given Valium. EKG shows a sinus tachycardia without any concerns otherwise. EKG does not show any signs of ischemia, Germán-Parkinson-White syndrome, brugada syndrome, Wellens' syndrome, prolong QTC, or pericarditis. Patient had blood work per above that shows a normal white count normal hemoglobin normal platelets normal troponin normal lipase normal kidney function essentially normal electrolytes with a slightly low potassium and a slightly low calcium. Normal liver panel. Head CT also obtained and negative for any acute intracranial abnormality. I do not suspect an acute stroke and don't think this needs to be further worked up. I believe all major causes of concern have been ruled out and I believe the patient can safely discharged home. I would wonder if he is having medication side effect from his medical marijuana or anxiety. He is to discharge home monitor symptoms and follow-up as needed. Return here with any new or worsening issues. He is discharged in stable condition\"     " "Jamshid is still experiencing intermittent \"endorphin rush\" from his stomach to his shoulders. He has experienced panic attacks before and feels these symptoms are different. Of note, he has been weaning off suboxone for an upcoming surgery next week, although he feels his opiate withdrawal also feels different.    He called because he didn't know what else to do after visiting the ED. We discussed possible causes of his symptoms, that he is feeling improved now, and that he was cleared in the ED. He said he would go back to the ED if symptoms reoccur. He wants his  providers Dr. Sage and Dr. Noel Breen to know about this incident    A: 56M with history of MDD, Anxiety, Panic, hx Alcohol use disorder, chronic pain (on suboxone) who calls with a weird dream followed by shakiness, stuttering, dizziness, and sense of doom. He was seen in the [facility] ED after calling 911 and was found to be medically stable after cardiac, head imaging, and lab studies were negative. Symptoms could be explained by panic attack (given hx of panic), opiate withdrawal (given suboxone wean for surgery), substance use (daily cannabis user and hx of others), among others.  Babs less likely given lack of personal history, although patient presents as hyperverbal, he is not too pressured.    P:  -Medically cleared by ED  -Appt with Dr. Noel Breen in 2 days  -follow up with psychiatrist Dr. Sage  -will forward encounter to outpatient providers, per patient request  -continue to monitor for symptoms and seek medical attention and/or call back if worsening  "

## 2022-01-03 ENCOUNTER — VIRTUAL VISIT (OUTPATIENT)
Dept: PSYCHIATRY | Facility: CLINIC | Age: 57
End: 2022-01-03
Attending: PSYCHOLOGIST
Payer: COMMERCIAL

## 2022-01-03 DIAGNOSIS — F33.1 MAJOR DEPRESSIVE DISORDER, RECURRENT EPISODE, MODERATE (H): Primary | ICD-10-CM

## 2022-01-03 DIAGNOSIS — F41.0 PANIC ATTACK: ICD-10-CM

## 2022-01-03 DIAGNOSIS — F41.9 ANXIETY: ICD-10-CM

## 2022-01-03 PROCEDURE — 90837 PSYTX W PT 60 MINUTES: CPT | Mod: 95 | Performed by: STUDENT IN AN ORGANIZED HEALTH CARE EDUCATION/TRAINING PROGRAM

## 2022-01-03 ASSESSMENT — PATIENT HEALTH QUESTIONNAIRE - PHQ9
SUM OF ALL RESPONSES TO PHQ QUESTIONS 1-9: 13
SUM OF ALL RESPONSES TO PHQ QUESTIONS 1-9: 13
10. IF YOU CHECKED OFF ANY PROBLEMS, HOW DIFFICULT HAVE THESE PROBLEMS MADE IT FOR YOU TO DO YOUR WORK, TAKE CARE OF THINGS AT HOME, OR GET ALONG WITH OTHER PEOPLE: VERY DIFFICULT

## 2022-01-03 NOTE — PROGRESS NOTES
"VIDEO VISIT  Jailene Breen is a 56 year old patient who is being evaluated via a billable video visit.      The patient has been notified of following:   \"We have found that certain health care needs can be provided without the need for an in-person physical exam. This service lets us provide the care you need with a video conversation. If a prescription is necessary we can send it directly to your pharmacy. If lab work is needed we can place an order for that and you can then stop by our lab to have the test done at a later time. Insurers are generally covering virtual visits as they would in-office visits so billing should not be different than normal.  If for some reason you do get billed incorrectly, you should contact the billing office to correct it and that number is in the AVS .    Patient has given verbal consent for video visit?: Yes   How would you like to obtain your AVS?: [employer]  AVS SmartPhrase [PsychAVS] has been placed in 'Patient Instructions': Yes      Video- Visit Details  Type of service:  video visit for psychotherapy  Time of service:    Date:  1/3/22    Video Start Time:  9 AM        Video End Time:  955 AM    Reason for video visit:  Patient unable to travel due to Covid-19  Originating Site (patient location):  [facility]   Location- Patient's home  Distant Site (provider location):  Remote location  Mode of Communication:  Video Conference via AmWell  Consent:  Patient has given verbal consent for video visit?: Yes       Clinician Contact & Progress Note  Department of Psychiatry & Behavioral Sciences  [facility]    Patient: Jailene Breen (1965)     MRN: [id number]  Date of Treatment:  Jake 3, 2022  Duration of Treatment: Start Time: 9:00 AM; End Time: 9:55 AM  Provider: Franklin Breen MD     People present:   Provider: Franklin Cherry  Patient: Jailene Breen    Encounter Diagnoses   Name Primary?     Major depressive disorder, recurrent " "episode, moderate (H) Yes     Anxiety      Panic attack        Assessment (current symptoms):    PHQ 2022   PHQ-9 Total Score 17 19 19   Q9: Thoughts of better off dead/self-harm past 2 weeks More than half the days Nearly every day More than half the days   F/U: Thoughts of suicide or self-harm Yes Yes Yes   F/U: Self harm-plan No No No   F/U: Self-harm action No No No   F/U: Safety concerns No No No     JEFF-7 SCORE 2022   Total Score - - -   Total Score 9 (mild anxiety) - 20 (severe anxiety)   Total Score 9 20 20       Session Content:     - \"really had a tough weekend, worst day ever\"  - \"Woke up  around 2AM thinking I was gonna die, I was stuttering, knees were shaking, heart was racing\"  - Says this was different from his usual panic attacks, couldn't \"say what I wanted to say\". \"felt endorphin rush in stomach going up to chest where it got cold\"  - Most scared he has ever been, \"said goodbye to wife and daughter\"  - Slept 12 hours the last few days, still has headache  - Went to ED and was medically cleared, given a benzodiazepine and discharged    - Reviewed DSM definition of panic attack, admits this fits. Also discussed confusional arousals. Despite this still considering if it was something more serious.   - Denies any drugs or alcohol over holidays besides medical marijuana  - Also weaning off suboxone for surgery    - Reports holidays were \"happy and sad\", spent time with children and wife. Sad as he is not talking to his mom  - Says he wants contact with his mom but \"I've tried so many times and we don't get along\". Reports she is a narcissist, lies about him, says mean things, disrupts family life and kids.   - \"She is my mom, she's by herself after step-dad \". Thinks he did \"the right thing\" by cutting her out, thinks she would want him to apologize.     - Reports the disability settlement has worried him of \"so this is it, forever, then I " "just die\".  - Says main goal is to finish school, thinks he will go back next fall  - Discussed that having activities and projects in the meantime will help him feel more fulfilled. Has been trying to take up hobbies such as coin collection, photography, painting, playing guitar (ideally)  - Says he used to feel strong physically and mentally, doesn't feel either right now. Says if he  right now wouldn't feel fulfilled.   - Also discussed staying active to help with arthritis pain. Can go for walks. Wants to try metal finding outside, drones.   Answers for HPI/ROS submitted by the patient on 1/3/2022  If you checked off any problems, how difficult have these problems made it for you to do your work, take care of things at home, or get along with other people?: Very difficult  PHQ9 TOTAL SCORE: 13    Treatment Plan:   1. Depression- be more active (doing things around the house), more motivation, confidence in self, go back to school (degree in psychology)    2. Relationship with bio parents- have a conversation with mom without fighting, give her a hug. Have deeper conversations with bio dad, feel more like a son.     3. Feelings of worthlessness, guilt, self-blame- Less shame, more future oriented    Mental Status Exam:  Alertness: alert  and oriented  Appearance: well groomed  Behavior/Demeanor: cooperative, pleasant and calm, with good  eye contact   Speech: normal  Language: intact. Preferred language identified as English.  Psychomotor: normal or unremarkable  Mood: depressed and anxious  Affect: blunted and appropriate; was congruent to mood; was congruent to content  Thought Process/Associations: unremarkable  Thought Content:  Reports none;  Denies suicidal and violent ideation and delusions  -Suicidal ideation: denies SI, denies intent, and denies plan  -Homicidal Ideation: denies  Perception: Reports none;  Denies auditory hallucinations and visual hallucinations; intact    Insight: good  Judgment: " "good  Cognition: does  appear grossly intact    Billing for \"Interactive Complexity\"?    No    Franklin Breen MD     "

## 2022-01-04 ENCOUNTER — LAB (OUTPATIENT)
Dept: LAB | Facility: CLINIC | Age: 57
End: 2022-01-04
Payer: COMMERCIAL

## 2022-01-04 ENCOUNTER — TELEPHONE (OUTPATIENT)
Dept: FAMILY MEDICINE | Facility: CLINIC | Age: 57
End: 2022-01-04

## 2022-01-04 DIAGNOSIS — Z11.52 ENCOUNTER FOR PREOPERATIVE SCREENING LABORATORY TESTING FOR COVID-19 VIRUS: ICD-10-CM

## 2022-01-04 DIAGNOSIS — Z01.812 ENCOUNTER FOR PREOPERATIVE SCREENING LABORATORY TESTING FOR COVID-19 VIRUS: ICD-10-CM

## 2022-01-04 PROCEDURE — U0003 INFECTIOUS AGENT DETECTION BY NUCLEIC ACID (DNA OR RNA); SEVERE ACUTE RESPIRATORY SYNDROME CORONAVIRUS 2 (SARS-COV-2) (CORONAVIRUS DISEASE [COVID-19]), AMPLIFIED PROBE TECHNIQUE, MAKING USE OF HIGH THROUGHPUT TECHNOLOGIES AS DESCRIBED BY CMS-2020-01-R: HCPCS

## 2022-01-04 PROCEDURE — U0005 INFEC AGEN DETEC AMPLI PROBE: HCPCS

## 2022-01-04 ASSESSMENT — PATIENT HEALTH QUESTIONNAIRE - PHQ9: SUM OF ALL RESPONSES TO PHQ QUESTIONS 1-9: 13

## 2022-01-04 NOTE — TELEPHONE ENCOUNTER
RNs,   Patient calling because he has lab only today for covid test for preop  Has had exposure now though to known covid positive person - that person does not live with him - [place]  Also has symptoms himself - sounds congested and does not feel well   Pt wants to keep lab only in case negative and can proceed with his surgery   Please advise if otherwise  Lilia JUNIOR RN

## 2022-01-04 NOTE — TELEPHONE ENCOUNTER
Patient was exposed to COVID and also having symptoms. Patient has COVID test scheduled for 1-4-22 and will call once results are back.

## 2022-01-04 NOTE — TELEPHONE ENCOUNTER
Pt has already arrived for his covid testing appt. Will route to specialty triage as Dr. Mack is ordering provider and surgery is scheduled for 1/7/2022.    Renetta Resendez RN  [facility]

## 2022-01-05 LAB — SARS-COV-2 RNA RESP QL NAA+PROBE: POSITIVE

## 2022-01-06 ENCOUNTER — MYC MEDICAL ADVICE (OUTPATIENT)
Dept: PALLIATIVE MEDICINE | Facility: CLINIC | Age: 57
End: 2022-01-06
Payer: COMMERCIAL

## 2022-01-06 DIAGNOSIS — M45.7 ANKYLOSING SPONDYLITIS OF LUMBOSACRAL REGION (H): ICD-10-CM

## 2022-01-06 DIAGNOSIS — M05.79 RHEUMATOID ARTHRITIS INVOLVING MULTIPLE SITES WITH POSITIVE RHEUMATOID FACTOR (H): ICD-10-CM

## 2022-01-06 DIAGNOSIS — G89.29 CHRONIC BILATERAL LOW BACK PAIN WITH RIGHT-SIDED SCIATICA: ICD-10-CM

## 2022-01-06 DIAGNOSIS — M25.551 HIP PAIN, RIGHT: ICD-10-CM

## 2022-01-06 DIAGNOSIS — M51.369 DDD (DEGENERATIVE DISC DISEASE), LUMBAR: ICD-10-CM

## 2022-01-06 DIAGNOSIS — M54.59 LUMBAR FACET JOINT PAIN: ICD-10-CM

## 2022-01-06 DIAGNOSIS — F11.20 UNCOMPLICATED OPIOID DEPENDENCE (H): ICD-10-CM

## 2022-01-06 DIAGNOSIS — M54.41 CHRONIC BILATERAL LOW BACK PAIN WITH RIGHT-SIDED SCIATICA: ICD-10-CM

## 2022-01-06 RX ORDER — BUPRENORPHINE AND NALOXONE 8; 2 MG/1; MG/1
0.5 FILM, SOLUBLE BUCCAL; SUBLINGUAL 2 TIMES DAILY
Qty: 30 EACH | Refills: 0 | Status: SHIPPED | OUTPATIENT
Start: 2022-01-06 | End: 2022-02-04

## 2022-01-06 NOTE — TELEPHONE ENCOUNTER
Signed Prescriptions:                        Disp   Refills    buprenorphine HCl-naloxone HCl (SUBOXONE) *30 each0        Sig: Place 0.5 Film under the tongue 2 times daily Max 1           film per day. Fill/begin now to last 30 days for           chronic pain  Authorizing Provider: FRANCISCA COMBS [place] Prescription Monitoring Program, appears appropriate.     Request seems appropriate, refilled for provider who is out of office.    JUANITA Sparrow [facility]

## 2022-01-06 NOTE — TELEPHONE ENCOUNTER
Routed to the nursing pool and to the MA pool to process refill(s).    Cha Molina, RN-BSN  [facility]

## 2022-01-06 NOTE — TELEPHONE ENCOUNTER
Received call from patient requesting refill(s) of buprenorphine HCl-naloxone HCl (SUBOXONE) film    Patient did not specify which MG of medication. Please advise.     8-2MG film last dispensed from pharmacy on 12/03/21    FYI: patient also reports testing positive for COVID-19    Patient's last office/virtual visit by prescribing provider on 10/20/21    Next office/virtual appointment scheduled for None    Last urine drug screen date 03/16/21 by Dr. Fredo Foley  Last urine drug screen by prescribing provider 01/21/20    Current opioid agreement on file (completed within the last year) Yes Date of opioid agreement: 01/21/20 expiring soon    E-prescribe to   [id number] [address]  Phone: [phone] Fax: [phone]    Will route to nursing pool for review and preparation of prescription(s).     Viv Deng, [facility]

## 2022-01-06 NOTE — TELEPHONE ENCOUNTER
Spoke to patient. Advised prescription was sent to pharmacy. Confirmed pharmacy.     Gave fill and start date of today.     Patient stated understanding.    Viv Deng [facility] [place]

## 2022-01-06 NOTE — TELEPHONE ENCOUNTER
Medication refill information reviewed.     Last due:  Fill/begin 12/3/2021 to last 30 days  Due date:  1/2/22-overdue      Prescriptions prepped for review.     Cha RN-BSN  [facility]

## 2022-01-10 ENCOUNTER — VIRTUAL VISIT (OUTPATIENT)
Dept: PSYCHIATRY | Facility: CLINIC | Age: 57
End: 2022-01-10
Attending: PSYCHOLOGIST
Payer: COMMERCIAL

## 2022-01-10 ENCOUNTER — MYC MEDICAL ADVICE (OUTPATIENT)
Dept: FAMILY MEDICINE | Facility: CLINIC | Age: 57
End: 2022-01-10
Payer: COMMERCIAL

## 2022-01-10 DIAGNOSIS — F33.1 MAJOR DEPRESSIVE DISORDER, RECURRENT EPISODE, MODERATE (H): Primary | ICD-10-CM

## 2022-01-10 DIAGNOSIS — F41.9 ANXIETY: ICD-10-CM

## 2022-01-10 DIAGNOSIS — F41.0 PANIC ATTACK: ICD-10-CM

## 2022-01-10 PROCEDURE — 90837 PSYTX W PT 60 MINUTES: CPT | Mod: 95 | Performed by: STUDENT IN AN ORGANIZED HEALTH CARE EDUCATION/TRAINING PROGRAM

## 2022-01-10 NOTE — PROGRESS NOTES
"VIDEO VISIT  Jailene Breen is a 56 year old patient who is being evaluated via a billable video visit.      The patient has been notified of following:   \"We have found that certain health care needs can be provided without the need for an in-person physical exam. This service lets us provide the care you need with a video conversation. If a prescription is necessary we can send it directly to your pharmacy. If lab work is needed we can place an order for that and you can then stop by our lab to have the test done at a later time. Insurers are generally covering virtual visits as they would in-office visits so billing should not be different than normal.  If for some reason you do get billed incorrectly, you should contact the billing office to correct it and that number is in the AVS .    Patient has given verbal consent for video visit?: Yes   How would you like to obtain your AVS?: [employer]  AVS SmartPhrase [PsychAVS] has been placed in 'Patient Instructions': Yes      Video- Visit Details  Type of service:  video visit for psychotherapy  Time of service:    Date:  1/10/22    Video Start Time:  9 AM        Video End Time:   955 AM    Reason for video visit:  Patient unable to travel due to Covid-19  Originating Site (patient location):  [facility]   Location- Patient's home  Distant Site (provider location):  Remote location  Mode of Communication:  Video Conference via AmWell  Consent:  Patient has given verbal consent for video visit?: Yes         Clinician Contact & Progress Note  Department of Psychiatry & Behavioral Sciences  [facility]    Patient: Jailene Breen (1965)     MRN: [id number]  Date of Treatment:  Jake 10, 2022  Duration of Treatment: Start Time: 9:00 AM; End Time: 9:55 AM  Provider: Franklin Breen MD     People present:   Provider: Franklin Cherry  Patient: Jailene Breen    Encounter Diagnoses   Name Primary?     Major depressive disorder, recurrent " "episode, moderate (H) Yes     Anxiety      Panic attack        Assessment (current symptoms):    PHQ 2/14/2022 2/21/2022 2/28/2022   PHQ-9 Total Score 17 19 19   Q9: Thoughts of better off dead/self-harm past 2 weeks More than half the days Nearly every day More than half the days   F/U: Thoughts of suicide or self-harm Yes Yes Yes   F/U: Self harm-plan No No No   F/U: Self-harm action No No No   F/U: Safety concerns No No No     JEFF-7 SCORE 1/24/2022 2/14/2022 2/14/2022   Total Score - - -   Total Score 9 (mild anxiety) - 20 (severe anxiety)   Total Score 9 20 20       Session Content:     - Got Covid, whole family has it. Has significant cough, muscle aches, fatigue, etc. Did not feel he ever needed hospitalization.   - Tested positive last week, wondering if it had anything to do with his episode on the 31st.   - Had a return of some similar symptoms, e.g. knees shaking, anxiety, last week.   - Explored how his view of what happened on the 31st would change if it was just Covid, instead of more serious medical issue  - Surgery scheduled Friday is rescheduled due to Covid    - Discussed goals for as he starts feeling better. Wants to have plans, things to do. Main goal is to find an exercise he can do.   - Has wooden carved shoe with medals behind him, \"sign of me being healthy, now I'm not\".   - Used to run a lot(half marathons and marathons), last run was April 2017. 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   - Running used to be a huge stress relief, would get runner's high.  - Feels like running was \"taken away\" from him  - Explored if seeing his old medals  makes him feel worse about himself now, e.g. invalid. He says no, it's a sign that I can do whatever I want when I put my mind to it.   - Also used to be a big weight , power . Hurt rotator cuff benching, had to find another outlet, lifting to running. HAted it at first, but wanted to run a full marathon. Ended up loving it. " "    - Discussed how he has big long term goals, but struggles with shorter term, steps, day to day   - Discussed something that he had plans for that peter'ed out. 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 \"Gave up on a dream of mine\", then she broke up the summer after they graduated  - Felt lost, felt like he screwed things up.   - Then tried college, never finished a degree.  - Feels this \"unfinished business\" is driving him to get degree now, feel more completed. Degree is more about finishing it than finding better jobs/opportunities after.   - \"I don't want people to say I'm a quitter\", when asked if he thinks this is true says \"yes, I know my mom does\".   - Asked if degree is about making mom proud or proving her wrong. Says he wants mom to be proud of him, thinks if he called her after finishing degree he \"knows she would be\".   - Then says \"I don't want my wife or kids to think I'm a loser, maybe my wife thinks I'm a quitter sometimes\"  - When pressed admits \"the degree wouldn't change anything between me and my mom\". Says if she wasn't proud it wouldn't be a waste to get the degree.  - Says he is also getting degree to prove he can do it to himself, prove he is smart to himself. \"Feel like a lot of people don't respect me right now\".   - Discussed internal and external validation    - Wants to have plans every day, \"need to feel needed, a part of something, instead of just a loner\". \"Want to help keep the house clean, get back to school\".    Answers for HPI/ROS submitted by the patient on 1/10/2022  If you checked off any problems, how difficult have these problems made it for you to do your work, take care of things at home, or get along with other people?: Very difficult  PHQ9 TOTAL SCORE: 15    Treatment Plan:   1. Depression- be more active (doing things around the house), more motivation, confidence in self, go back to school (degree in psychology)    2. Relationship with bio " "parents- have a conversation with mom without fighting, give her a hug. Have deeper conversations with bio dad, feel more like a son.     3. Feelings of worthlessness, guilt, self-blame- Less shame, more future oriented    Mental Status Exam:  Alertness: alert  and oriented  Appearance: well groomed  Behavior/Demeanor: cooperative, pleasant and calm, with good  eye contact   Speech: normal  Language: intact. Preferred language identified as English.  Psychomotor: normal or unremarkable  Mood: depressed and anxious  Affect: blunted and appropriate; was congruent to mood; was congruent to content  Thought Process/Associations: unremarkable  Thought Content:  Reports none;  Denies suicidal and violent ideation and delusions  -Suicidal ideation: denies SI, denies intent, and denies plan  -Homicidal Ideation: denies  Perception: Reports none;  Denies auditory hallucinations and visual hallucinations; intact    Insight: good  Judgment: good  Cognition: does  appear grossly intact    Billing for \"Interactive Complexity\"?    No    Franklin Breen MD     "

## 2022-01-11 ASSESSMENT — PATIENT HEALTH QUESTIONNAIRE - PHQ9: SUM OF ALL RESPONSES TO PHQ QUESTIONS 1-9: 15

## 2022-01-11 NOTE — TELEPHONE ENCOUNTER
His positive covid test was a pre-procedure test. Last visit was for a preop end of December.  He hasn't actually been seen for covid.  An evisit would suffice    Ruperto Resendez PA-C

## 2022-01-18 NOTE — PROGRESS NOTES
"Video- Visit Details  Type of service:  video visit for medication management  Time of service:    Date:  01/19/2022    Video Start Time: 12:59 PM               Video End Time: 1:35 PM    Reason for video visit:  Patient unable to travel due to Covid-19  Originating Site (patient location):  [facility]   Location- Patient's home  Distant Site (provider location):  Remote location  Mode of Communication:  Video Conference via AmWell  Consent:  Patient has given verbal consent for video visit?: Yes        [facility]  Psychiatry Clinic  PSYCHIATRY PROGRESS NOTE     CARE TEAM:    PCP- Aiden Resendez   Rheumatology- Raghu Parada MD  [facility]- Susana GRANT; Santa Farrell PsyD  Therapist - MD Sravanthi Weststephenie Breen is a 56 year old patient who prefers the name Les and uses pronouns he, him, his.   PERTINENT BACKGROUND                                 [most recent eval 09/21/20]   Psych pertinent item history includes suicidal ideation, SIB [cutting], mutiple psychotropic trials , severe med reaction, psych hosp (<3), SUBSTANCE USE: alcohol, substance use treatment  and Major Medical Problems (Rheumatoid Arthritis and Ankylosing Spondylitis)    DIAGNOSES                       Major Depressive Disorder, recurrent, severe, without psychotic features  Generalized Anxiety Disorder, with panic    Chronic Pain  Ankylosing Spondylitis    ASSESSMENT                       Today, reports interim stability of depression and ongoing remission of suicidal ideation. 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 Had several episodes of shakiness and \"feeling like I was going to die\" overnight, 2 days later tested positive with COVID. Since his " "initial episode, subsequent symptoms have decreased in severity, could be related to restarting low-dose quetiapine or improvement of COVID, though this would be an atypical constellation of neurologic effects. Elected to continue monitoring at this time, as they may represent panic attacks and may need to consider alternative SSRI treatment.      [place]NPMP was checked today:  Indicates no medication misuse .    PLAN                                                                                   1) Medications:  -fluoxetine 40mg daily   -continued quetiapine 50mg at bedtime (patient been taking for 3 weeks)  -quetiapine 25-50mg as needed for anxiety and panic  -liothyronine 25mcg daily    2) Therapy- psychodynamic    3) Next Due   Labs- AP monitoring labs due 9/2022  EKG- as needed  Rating scales- serial PHQ9s    4) Referrals  none today    5) RTC: 6 weeks, prior to surgery    6) Crisis Numbers:   Provided routinely in AVS     After hours:  [phone]    INTERIM HISTORY        The patient reports good treatment adherence.  History was provided by the patient who was a good historian.     Since the last visit:   -Patient and family contracted COVID.   -\"Really strange\" because went to sleep \"and felt just fine\" and then woke up in panic, he ended up sleeping a significant amount the next couple nights. He attributed this to that event but then had a positive COVID test 2 days later. Since initial episode more physical symptoms: stuttering, knee shakiness, and chest tightness.  -Also \"got really excited\" because he found out that his student loans were forgiven. After that had a similar, more mild episode.   -Has been taking quetiapine 50mg at bedtime, which he has found helpful and used additional 50mg dose at night when he was experiencing the above symptoms, helped after ~1 hour.  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  -Denies suicidal ideation or thoughts of self harm.    RECENT PSYCH ROS:   Depression: depressed mood, low energy and indecisiveness   Elevated:  none  Psychosis:  none  Anxiety:  as above  Trauma Related:  none  Dysregulation:  none  Eating Disorder: no  Other: no    Adverse Effects:  denies  Pertinent Negative Symptoms: No suicidal ideation, violent ideation, aggression, psychosis, hallucinations, delusions, otto and hypomania    Recent Substance Use:     Alcohol- none since , used to attend AA meetings prior to COVID.  Tobacco- denies  Caffeine- yes  Cannabis- medical cannabis  Opioids- as prescribed           Narcan Kit- prescribed   Other illicit drugs- none    FAMILY and SOCIAL HISTORY                                      [per patient report]            Family Hx:  Mom - depression (since  when sister )    Social Hx:  Financial/ Work- currently attending college at [employer], is hoping to start school at [employer] for a degree in psychology after being unable to work due to diagnosis of rheumatoid arthritis and ankylosing spondylitis  Partner/ -  in .  Children- 25 and 27 year old kids      Living situation- [place], house with wife and 2 kids      Social/ Spiritual Support- Talks to  every two weeks for lunch, but \"doesn't really have friends\". Family is supportive.      PSYCH and SUBSTANCE USE Critical Summary Points since  - started escitalopram  November - increased escitalopram 20mg daily  February - stopped hydroxyzine; started trazodone 50mg at bedtime and 25mg daily as needed for anxiety   - Patient hospitalized started on Abilify, titrated to 5mg daily; escitalopram switched to fluoxetine 40mg " daily; started prazosin 1mg at bedtime. Pain management switched oxycodone to Suboxone and started medical marijuana.  May - patient still taking hydroxyzine, discontinued today.  June - patient hospitalized (6/15/21) discontinued Abilify and trazodone, started on quetiapine & titrated to 100mg at bedtime and 25mg as needed for sleep.   August - increase quetiapine 150mg   November - discontinued quetiapine 150mg (patient ran out of medications and did not take for several weeks).  December - patient restarted quetiapine 50mg at bedtime     MEDICAL HISTORY  and ALLERGY     ALLERGIES: Mirtazapine, Hydrocodone, Ms contin [morphine], and Remeron soltab     Patient Active Problem List   Diagnosis     CARDIOVASCULAR SCREENING; LDL GOAL LESS THAN 160     Obesity, Class I, BMI 30-34.9     Tear meniscus knee, right, initial encounter     Rheumatoid arthritis involving multiple sites, unspecified rheumatoid factor presence     Chronic pain     Right-sided thoracic back pain, unspecified chronicity     Generalized anxiety disorder     Panic attack     Ankylosing spondylitis (H)     Tobacco use disorder     Moderate major depression (H)     Immunocompromised (H)     Essential hypertension with goal blood pressure less than 140/90     Suicidal ideation     Insomnia due to other mental disorder     Major depressive disorder, recurrent episode, severe with mixed features (H)         MEDICAL REVIEW OF SYSTEMS     Contraception- none  A comprehensive review of systems was performed and is negative other than noted in the HPI.    MEDICATIONS          Current Outpatient Medications   Medication Sig Dispense Refill     acetaminophen (TYLENOL) 500 MG tablet Take 1,000 mg by mouth every 6 hours as needed for mild pain (headache)       buprenorphine HCl-naloxone HCl (SUBOXONE) 8-2 MG per film Place 0.5 Film under the tongue 2 times daily Max 1 film per day. Fill/begin now to last 30 days for chronic pain 30 each 0     etanercept (ENBREL  "SURECLICK) 50 MG/ML autoinjector Inject 50 mg Subcutaneous once a week 1 mL 3     FLUoxetine (PROZAC) 40 MG capsule Take 1 capsule (40 mg) by mouth daily 30 capsule 2     folic acid (FOLVITE) 1 MG tablet Take 5 tablets (5 mg) by mouth daily 150 tablet 3     liothyronine (CYTOMEL) 25 MCG tablet Take 1 tablet (25 mcg) by mouth daily 30 tablet 2     medical cannabis (Patient's own supply) See Admin Instructions (The purpose of this order is to document that the patient reports taking medical cannabis.  This is not a prescription, and is not used to certify that the patient has a qualifying medical condition.)     Pills PRN   Lozenges PRN       methotrexate sodium 2.5 MG TABS TAKE 4 TABLETS BY MOUTH ONCE A WEEK 48 tablet 0     nicotine polacrilex (NICORETTE) 4 MG gum PLACE AND PARK ONE PIECE INSIDE CHEEK AS NEEDED FOR SMOKING CESSATION 200 each 1     QUEtiapine (SEROQUEL) 25 MG tablet Take 1-2 tablets (25-50 mg) by mouth 2 times daily as needed (for severe anxiety/panic) 60 tablet 1     VITALS                     There were no vitals taken for this visit.   MENTAL STATUS EXAM         Alertness: alert  and oriented  Appearance: adequately groomed  Behavior/Demeanor: cooperative, with good  eye contact   Speech: regular rate and rhythm  Language: intact  Psychomotor: normal or unremarkable  Mood: \"a little low\"   Affect: appropriate; congruent to: mood- yes, content- yes  Thought Process/Associations: unremarkable  Thought Content:  Reports none;  Denies suicidal ideation, violent ideation and delusions   Perception:  Reports none;  Denies auditory hallucinations and visual hallucinations  Insight: good  Judgment: good  Cognition: (6) oriented: time, person, and place  attention span: intact  concentration: intact  recent memory: intact  remote memory: intact  fund of knowledge: appropriate  Gait and Station: N/A (telehealth)    LABS and DATA     PHQ9 TODAY = 15  PHQ 11/30/2021 1/3/2022 1/10/2022   PHQ-9 Total Score 18 13 " 15   Q9: Thoughts of better off dead/self-harm past 2 weeks Several days Not at all Not at all   F/U: Thoughts of suicide or self-harm No - -   F/U: Self harm-plan - - -   F/U: Self-harm action - - -   F/U: Safety concerns No - -       Recent Labs   Lab Test 10/11/21  1123 06/16/21  0741   CR 0.91 1.09   GFRESTIMATED >90 77     Recent Labs   Lab Test 10/11/21  1123 06/16/21  0741   AST 37 23   ALT 47 33   ALKPHOS 102 88     Recent Labs   Lab Test 10/11/21  1123 06/16/21  0741   GLC 97 92   A1C 5.2  --      Recent Labs   Lab Test 10/11/21  1123   CHOL 209*   TRIG 225*   *   HDL 30*     Recent Labs   Lab Test 10/11/21  1123 06/16/21  0741   AST 37 23   ALT 47 33   ALKPHOS 102 88     Recent Labs   Lab Test 10/11/21  1123 06/16/21  0741 03/21/21  0808 03/17/21  0748   WBC 7.0 6.1 6.8 8.3   ANEU  --   --  3.7 4.4   HGB 16.2 14.6 15.6 15.0    166 246 211     PSYCHOTROPIC DRUG INTERACTIONS   Increased risk of QTc prolongation: fluoxetine, quetiapine, Suboxone  Increased risk of serotonin syndrome: fluoxetine, Suboxone  Increased risk of CNS/respiratory depression: Suboxone, quetiapine    MANAGEMENT:  Monitoring for adverse effects and patient is aware of risks    RISK STATEMENT for SAFETY     Today, Jamison Breen does not report any suicidal ideation and does not appear to be an imminent risk to self or others.    SUICIDE RISK ASSESSMENT-  Risk factors for self-harm: previous suicide attempt, recent psych inpt , financial/legal stress, significant pain and takes opiates.  Mitigating factors: h/o seeking help , commitment to family and participation in DBT or day program.  The patient does not appear to be at imminent risk for self-harm, hospitalization is recommended which the pt does not agree to. No hospitalization will be arranged. Safety plan reviewed today including contacting family, friends (), day treatment team, clinic, or crisis line.    TREATMENT RISK STATEMENT:  The risks, benefits,  alternatives and potential adverse effects have been discussed and are understood by the pt. The pt understands the risks of using street drugs or alcohol. There are no medical contraindications, the pt agrees to treatment with the ability to do so. The pt knows to call the clinic for any problems or to access emergency care if needed.  Medical and substance use concerns are documented above.  Psychotropic drug interaction check was done, including changes made today.    PROVIDER: Slim Sage MD    Patient was not staffed.  Supervisor is Dr. Downing who will sign the note.

## 2022-01-19 ENCOUNTER — VIRTUAL VISIT (OUTPATIENT)
Dept: PSYCHIATRY | Facility: CLINIC | Age: 57
End: 2022-01-19
Attending: PSYCHIATRY & NEUROLOGY
Payer: COMMERCIAL

## 2022-01-19 DIAGNOSIS — F41.9 ANXIETY: ICD-10-CM

## 2022-01-19 DIAGNOSIS — F32.A DEPRESSION, UNSPECIFIED DEPRESSION TYPE: ICD-10-CM

## 2022-01-19 DIAGNOSIS — F33.2 MAJOR DEPRESSIVE DISORDER, RECURRENT SEVERE WITHOUT PSYCHOTIC FEATURES (H): ICD-10-CM

## 2022-01-19 PROCEDURE — 99214 OFFICE O/P EST MOD 30 MIN: CPT | Mod: 95 | Performed by: STUDENT IN AN ORGANIZED HEALTH CARE EDUCATION/TRAINING PROGRAM

## 2022-01-19 RX ORDER — FLUOXETINE 40 MG/1
40 CAPSULE ORAL DAILY
Qty: 30 CAPSULE | Refills: 2 | Status: SHIPPED | OUTPATIENT
Start: 2022-01-19 | End: 2022-02-23

## 2022-01-19 RX ORDER — QUETIAPINE FUMARATE 50 MG/1
50 TABLET, FILM COATED ORAL AT BEDTIME
Qty: 45 TABLET | Refills: 2 | Status: SHIPPED | OUTPATIENT
Start: 2022-01-19 | End: 2022-02-23

## 2022-01-19 RX ORDER — LIOTHYRONINE SODIUM 25 UG/1
25 TABLET ORAL DAILY
Qty: 30 TABLET | Refills: 2 | Status: SHIPPED | OUTPATIENT
Start: 2022-01-19 | End: 2022-02-23

## 2022-01-19 ASSESSMENT — PAIN SCALES - GENERAL: PAINLEVEL: EXTREME PAIN (8)

## 2022-01-19 NOTE — PATIENT INSTRUCTIONS
**For crisis resources, please see the information at the end of this document**   Patient Education    Thank you for coming to the [facility] [place] [facility].    Lab Testing:  If you had lab testing today and your results are reassuring or normal they will be mailed to you or sent through [employer] within 7 days. If the lab tests need quick action we will call you with the results. The phone number we will call with results is # [phone] (home) . If this is not the best number please call our clinic and change the number.    Medication Refills:  If you need any refills please call your pharmacy and they will contact us. Our fax number for refills is [phone]. Please allow three business for refill processing. If you need to  your refill at a new pharmacy, please contact the new pharmacy directly. The new pharmacy will help you get your medications transferred.     Scheduling:  If you have any concerns about today's visit or wish to schedule another appointment please call our office during normal business hours [phone] (8-5:00 M-F)    Contact Us:  Please call [phone] during business hours (8-5:00 M-F).  If after clinic hours, or on the weekend, please call  [phone].    Financial Assistance [phone]  [facility] Billing [phone]  Central Billing Office, [facility]: [phone]  [facility] Billing [phone]  Medical Records [phone]  [facility] Patient Bill of Rights [email or web address]       MENTAL HEALTH CRISIS NUMBERS:  For a medical emergency please call  911 or go to the nearest ER.     [facility]:   [facility] -[phone]   Crisis Residence [facility] -[phone]   Walk-In Counseling Center [facility] -[phone]   COPE 24/7 [place] Mobile Team -[phone] (adults)/[phone] (child)  CHILD: [facility] needs assessment team -  [phone]      [facility]:   [facility] - [phone]   Walk-in counseling [facility] - [phone]   Walk-in counseling [facility] - [phone]   Crisis Residence [facility] - [phone]  Urgent Care Adult Mental Nnsqwx-[phone] mobile unit/ 24/7 crisis line    National Crisis Numbers:   National Suicide Prevention Lifeline: 7-622-760-TALK (326-401-9072)  Poison Control Center - 6-952-398-3264  [facility]/resources for a list of additional resources (SOS)  Trans Lifeline a hotline for transgender people 1-509-173-2380  The Tu Project a hotline for LGBT youth 8-957-524-7518  Crisis Text Line: For any crisis 24/7   To: 326177  see www.crisistextline.org  - IF MAKING A CALL FEELS TOO HARD, send a text!         Again thank you for choosing [facility] MENTAL HEALTH & ADDICTION [facility] and please let us know how we can best partner with you to improve you and your family's health.    You may be receiving a survey regarding this appointment. We would love to have your feedback, both positive and negative. The survey is done by an external company, so your answers are anonymous.

## 2022-01-19 NOTE — PROGRESS NOTES
"VIDEO VISIT  Jailene Breen is a 56 year old patient that has consented to receive services via billable video visit.      The patient has been notified of following:   \"This video visit will be conducted via a call between you and your physician/provider. We have found that certain health care needs can be provided without the need for an in-person physical exam. This service lets us provide the care you need with a video conversation. If a prescription is necessary we can send it directly to your pharmacy. If lab work is needed we can place an order for that and you can then stop by our lab to have the test done at a later time. Insurers are generally covering virtual visits as they would in-office visits so billing should not be different than normal.  If for some reason you do get billed incorrectly, you should contact the billing office to correct it and that number is in the AVS .    Patient will join video visit via:  email invite was sent (C[employer] is preferred, but patient is unable to join via Pond[employer])    If patient attempts to join the video via [employer] at appointment start time, but is unable to, they would prefer that the provider send them a video invitation via:   Send to preferred e-mail: [email or web address]      How would patient like to obtain AVS?:  MyChart    "

## 2022-01-24 ENCOUNTER — VIRTUAL VISIT (OUTPATIENT)
Dept: PSYCHIATRY | Facility: CLINIC | Age: 57
End: 2022-01-24
Attending: PSYCHOLOGIST
Payer: COMMERCIAL

## 2022-01-24 DIAGNOSIS — F41.9 ANXIETY: ICD-10-CM

## 2022-01-24 DIAGNOSIS — F33.2 MAJOR DEPRESSIVE DISORDER, RECURRENT SEVERE WITHOUT PSYCHOTIC FEATURES (H): Primary | ICD-10-CM

## 2022-01-24 DIAGNOSIS — F41.0 PANIC ATTACK: ICD-10-CM

## 2022-01-24 PROCEDURE — 90837 PSYTX W PT 60 MINUTES: CPT | Mod: 95 | Performed by: STUDENT IN AN ORGANIZED HEALTH CARE EDUCATION/TRAINING PROGRAM

## 2022-01-24 ASSESSMENT — PATIENT HEALTH QUESTIONNAIRE - PHQ9
SUM OF ALL RESPONSES TO PHQ QUESTIONS 1-9: 15
SUM OF ALL RESPONSES TO PHQ QUESTIONS 1-9: 15
10. IF YOU CHECKED OFF ANY PROBLEMS, HOW DIFFICULT HAVE THESE PROBLEMS MADE IT FOR YOU TO DO YOUR WORK, TAKE CARE OF THINGS AT HOME, OR GET ALONG WITH OTHER PEOPLE: VERY DIFFICULT

## 2022-01-24 ASSESSMENT — ANXIETY QUESTIONNAIRES
2. NOT BEING ABLE TO STOP OR CONTROL WORRYING: SEVERAL DAYS
7. FEELING AFRAID AS IF SOMETHING AWFUL MIGHT HAPPEN: SEVERAL DAYS
7. FEELING AFRAID AS IF SOMETHING AWFUL MIGHT HAPPEN: SEVERAL DAYS
4. TROUBLE RELAXING: MORE THAN HALF THE DAYS
GAD7 TOTAL SCORE: 9
3. WORRYING TOO MUCH ABOUT DIFFERENT THINGS: SEVERAL DAYS
2. NOT BEING ABLE TO STOP OR CONTROL WORRYING: SEVERAL DAYS
GAD7 TOTAL SCORE: 9
3. WORRYING TOO MUCH ABOUT DIFFERENT THINGS: SEVERAL DAYS
1. FEELING NERVOUS, ANXIOUS, OR ON EDGE: MORE THAN HALF THE DAYS
6. BECOMING EASILY ANNOYED OR IRRITABLE: SEVERAL DAYS
GAD7 TOTAL SCORE: 9
7. FEELING AFRAID AS IF SOMETHING AWFUL MIGHT HAPPEN: SEVERAL DAYS
6. BECOMING EASILY ANNOYED OR IRRITABLE: SEVERAL DAYS
1. FEELING NERVOUS, ANXIOUS, OR ON EDGE: MORE THAN HALF THE DAYS
7. FEELING AFRAID AS IF SOMETHING AWFUL MIGHT HAPPEN: SEVERAL DAYS
GAD7 TOTAL SCORE: 9
5. BEING SO RESTLESS THAT IT IS HARD TO SIT STILL: SEVERAL DAYS
5. BEING SO RESTLESS THAT IT IS HARD TO SIT STILL: SEVERAL DAYS
GAD7 TOTAL SCORE: 9
GAD7 TOTAL SCORE: 9
4. TROUBLE RELAXING: MORE THAN HALF THE DAYS

## 2022-01-24 NOTE — PROGRESS NOTES
"VIDEO VISIT  Jailene Breen is a 56 year old patient who is being evaluated via a billable video visit.      The patient has been notified of following:   \"We have found that certain health care needs can be provided without the need for an in-person physical exam. This service lets us provide the care you need with a video conversation. If a prescription is necessary we can send it directly to your pharmacy. If lab work is needed we can place an order for that and you can then stop by our lab to have the test done at a later time. Insurers are generally covering virtual visits as they would in-office visits so billing should not be different than normal.  If for some reason you do get billed incorrectly, you should contact the billing office to correct it and that number is in the AVS .    Patient has given verbal consent for video visit?: Yes   How would you like to obtain your AVS?: Humanoid  AVS SmartPhrase [PsychAVS] has been placed in 'Patient Instructions': Yes      Video- Visit Details  Type of service:  video visit for psychotherapy  Time of service:    Date:  1/24/22    Video Start Time:  9 AM       Video End Time:  955 AM    Reason for video visit:  Patient unable to travel due to Covid-19  Originating Site (patient location):  [facility]   Location- Patient's home  Distant Site (provider location):  Remote location  Mode of Communication:  Video Conference via AmWell  Consent:  Patient has given verbal consent for video visit?: Yes         Clinician Contact & Progress Note  Department of Psychiatry & Behavioral Sciences  [facility]    Patient: Jailene Breen (1965)     MRN: [id number]  Date of Treatment:  Jan 24, 2022  Duration of Treatment: Start Time: 9:00 AM; End Time: 9:55 AM  Provider: Franklin Breen MD     People present:   Provider: Franklin Cherry  Patient: Jailene Breen    Encounter Diagnoses   Name Primary?     Major depressive disorder, recurrent " "severe without psychotic features (H) Yes     Anxiety      Panic attack        Assessment (current symptoms):    PHQ 2022   PHQ-9 Total Score 17 19 19   Q9: Thoughts of better off dead/self-harm past 2 weeks More than half the days Nearly every day More than half the days   F/U: Thoughts of suicide or self-harm Yes Yes Yes   F/U: Self harm-plan No No No   F/U: Self-harm action No No No   F/U: Safety concerns No No No     JEFF-7 SCORE 2022   Total Score - - -   Total Score 9 (mild anxiety) - 20 (severe anxiety)   Total Score 9 20 20       Session Content:     - reports yesterday was hard, daughter's dog had a few seizures, were up late  - Feels he is recovering from Covid, but has occasional periods of low energy  - Has surgery scheduled     - Had a couple nights where he felt like he was going to have panic attack again, legs get shaky. Takes a few quetiapine and then waits for them to work or fall asleep.   - One of the days, got news the disability would pay off student loans. Later that day had shakiness in knees.   - Reports feeling difference since , \"time is flying by now\"      Answers for HPI/ROS submitted by the patient on 2022  If you checked off any problems, how difficult have these problems made it for you to do your work, take care of things at home, or get along with other people?: Very difficult  PHQ9 TOTAL SCORE: 15  JEFF 7 TOTAL SCORE: 9    - Denies active suicidal intent, \"sometimes wish I wasn't here, that none of this stuff was happening. [place] like I didn't want to be around. Feeling useless\".   - With the student loan news, feels \"no one expects anything out of me. The world is on to the next generation. Feel like there's nothing left for me.   - Having nightmares that siblings call to tell him his mom . Feels very guilty about not being able to talk to mom. Holidays are very hard, wishes he could share good and bad things " "with his mom. \"We're fighting over something we don't remember, it's so stupid, family should be there during good and bad times\". Also angry with bio-dad, he has not checked in at all about Covid.\" \"Feels like no one care about me, I could die today and it wouldn't affect me\".   - Reports hearing from brother and an uncle about what mom is saying, e.g. that Jamison is \"nothing but a liar\", says his mom thinks Les called his niece \"a druggie\", he also feels like \"she ignores my daughter\". Jamison is sad that his  Wife and daughter won't talk to his mom.  - Jamison said \"no one's been checking in\", then admits he only means mom and bio-dad. His brother, uncle, wife, daughter check in. Admits to not having friends to talk to.   - Did talk to , they talk once a month.  said they do have an open \"therapist position\", but would not need a degree. Cautioned against this. \"Exciting because someone wanted me for something\". Encouraged to step back and evaluate when he thinks this  - Discussed Andrews stages of development    - Goals: focus on getting healthier mentally and physically, encouraged more concrete/short term goals  - Go for walks 5 days of the next 7    Answers for HPI/ROS submitted by the patient on 1/10/2022  If you checked off any problems, how difficult have these problems made it for you to do your work, take care of things at home, or get along with other people?: Very difficult  PHQ9 TOTAL SCORE: 15    Treatment Plan:   1. Depression- be more active (doing things around the house), more motivation, confidence in self, go back to school (degree in psychology)    2. Relationship with bio parents- have a conversation with mom without fighting, give her a hug. Have deeper conversations with bio dad, feel more like a son.     3. Feelings of worthlessness, guilt, self-blame- Less shame, more future oriented    Mental Status Exam:  Alertness: alert  and oriented  Appearance: well groomed  Behavior/Demeanor: " "cooperative, pleasant and calm, with good  eye contact   Speech: normal  Language: intact. Preferred language identified as English.  Psychomotor: normal or unremarkable  Mood: depressed and anxious  Affect: blunted and appropriate; was congruent to mood; was congruent to content  Thought Process/Associations: unremarkable  Thought Content:  Reports none;  Denies suicidal and violent ideation and delusions  -Suicidal ideation: denies SI, denies intent, and denies plan  -Homicidal Ideation: denies  Perception: Reports none;  Denies auditory hallucinations and visual hallucinations; intact    Insight: good  Judgment: good  Cognition: does  appear grossly intact    Billing for \"Interactive Complexity\"?    No    Franklin Breen MD       "

## 2022-01-25 ASSESSMENT — ANXIETY QUESTIONNAIRES
GAD7 TOTAL SCORE: 9
GAD7 TOTAL SCORE: 9

## 2022-01-25 ASSESSMENT — PATIENT HEALTH QUESTIONNAIRE - PHQ9: SUM OF ALL RESPONSES TO PHQ QUESTIONS 1-9: 15

## 2022-01-28 ENCOUNTER — TELEPHONE (OUTPATIENT)
Dept: PSYCHOLOGY | Facility: CLINIC | Age: 57
End: 2022-01-28
Payer: COMMERCIAL

## 2022-01-28 NOTE — TELEPHONE ENCOUNTER
"[facility] PHQ-9 Follow-up  Behavioral Health Clinician Triage Service    [facility] PHQ[facility] Responses:  [facility] Follow-up to PHQ 1/3/2022 1/10/2022 1/24/2022   PHQ-9 9. Suicide Ideation past 2 weeks Not at all Not at all Several days   Thoughts of suicide or self harm in past 2 weeks - - Yes   Thoughts of suicide or self harm in past 2 weeks - - Yes   PHQ-9 Self harm plan? - - No   PHQ-9 Self harm action? - - No   PHQ-9 Safety concerns? - - No   PHQ-9 Self harm plan? - - No   PHQ-9 Self harm action? - - No   PHQ-9 Safety concerns? - - No        1st Outreach Date: January 28, 2022 Time: 9:10a  Outcome: Completed phone conversation / triage service.  See assessment and disposition below.  [facility], [facility]      Hi my name is [facility]ma [facility].  I m calling from [facility] to follow-up on a questionnaire you completed on [facility] [facility].      If it s ok I d like review a few of your symptoms/responses and a talk briefly  about how you have been doing to see if I might be able to provide some support and guidance.       Address/Location of patient: home  Have any supportive people near them? lives with spouse    [facility] spoke with patient via phone.  Pt reports that he has been sick with COVID and a cold for several weeks.  He shares that he has not been taking his RA medications due to concerns of taking while sick and he shares that his chronic RA pain is worse.  Pt shares that he has sense of worthlessness as he shares he has \"no current goals\" and he \"can't work\".  Pt shares that he has passive SI but denies any plans or intent.  Pt has a psychiatrist and a therapist he sees regularly and feels he has good support.  Pt was willing to complete a brief safety plan with [facility] today and a copy will be sent to pt via My Chart.    Risk Assessment:  Patient has had a history of suicidal ideation: SI off an on for several years and suicide attempts: reports hospitalzied last year following attempt  Patient reports " "the following current or recent suicidal ideation or behaviors: reports currently has some passive SI.  Denies plan or intent.  Patient denies current or recent homicidal ideation or behaviors.  Patient denies current or recent self injurious behavior or ideation.  Patient denies other safety concerns.  Patient reports there are no firearms in the house  Protective Factors Sense of responsibility to family and Positive social support   Risk Factors Sense of worthlessness    ASQ Assessment:  1. In the past few weeks, have you wished you were dead?  Yes:  Passive thoughts of \"being better off gone\"  2. In the past few weeks, have you felt that you or your family would be better off if you         were dead?  Yes: passive SI  3. In the past week, have you been having thoughts about killing yourself?  No  4. Have you ever tried to kill yourself?  Yes: hospitalized last year    Disposition:    - Recommendations / Safety Plan: A safety and risk management plan has been developed including: Patient consented to co-developed safety plan.  A safety and risk management plan was completed.  Patient agreed to use safety plan should any safety concerns arise.  A copy was given to the patient.       [facility]                                      Patient's Name: Jailene Breen  January 28, 2022    SAFETY PLAN:  Step 1: Warning signs / cues (Thoughts, images, mood, situation, behavior) that a crisis may be developing:    Thoughts: feels worthless, no goals    Images: denies    Thinking Processes: highly critical and negative thoughts: feels worthless    Mood: worsening depression    Behaviors: sleeping too much    Situations: pain and relationship problems   Step 2: Coping strategies - Things I can do to take my mind off of my problems without contacting another person (relaxation technique, physical activity):    Distress Tolerance Strategies:  play with my pet  and go for a drive    Physical " "Activities: go for a walk    Focus on helpful thoughts:  \"I will get through this\"  Step 3: People and social settings that provide distraction:   Name:  Phone: number in phone    Name: earline Phone: number in phone   Step 4: Remind myself of people and things that are important to me and worth living for:  Family, pets    Step 5: When I am in crisis, I can ask these people to help me use my safety plan:   Name:  Phone: number in phone   Name: earline Phone: number in phone    Step 6: Making the environment safe:     be around others  Step 7: Professionals or agencies I can contact during a crisis:    Suicide Prevention Lifeline: 3-184-735-TALK (7166)    Crisis Text Line Service: Text   [place]  to 767593.    Local Crisis Services: [facility]:[phone]    Call 911 or go to my nearest emergency department.   I helped develop this safety plan and agree to use it when needed.  I have been given a copy of this plan.      Patient signature: _______________________________________________________________  Today s date:  January 28, 2022  Adapted from Safety Plan Template 2008 Latanya Hooper and Isac Talbert is reprinted with the express permission of the authors.  No portion of the Safety Plan Template may be reproduced without the express, written permission.  You can contact the authors at [email or web address] or [email or web address].              "

## 2022-01-31 ENCOUNTER — VIRTUAL VISIT (OUTPATIENT)
Dept: PSYCHIATRY | Facility: CLINIC | Age: 57
End: 2022-01-31
Attending: PSYCHOLOGIST
Payer: COMMERCIAL

## 2022-01-31 DIAGNOSIS — F41.1 GENERALIZED ANXIETY DISORDER: ICD-10-CM

## 2022-01-31 DIAGNOSIS — F33.2 MAJOR DEPRESSIVE DISORDER, RECURRENT EPISODE, SEVERE WITH MIXED FEATURES (H): Primary | ICD-10-CM

## 2022-01-31 DIAGNOSIS — F41.0 PANIC ATTACK: ICD-10-CM

## 2022-01-31 PROCEDURE — 90837 PSYTX W PT 60 MINUTES: CPT | Mod: U7 | Performed by: STUDENT IN AN ORGANIZED HEALTH CARE EDUCATION/TRAINING PROGRAM

## 2022-01-31 ASSESSMENT — PATIENT HEALTH QUESTIONNAIRE - PHQ9
10. IF YOU CHECKED OFF ANY PROBLEMS, HOW DIFFICULT HAVE THESE PROBLEMS MADE IT FOR YOU TO DO YOUR WORK, TAKE CARE OF THINGS AT HOME, OR GET ALONG WITH OTHER PEOPLE: VERY DIFFICULT
SUM OF ALL RESPONSES TO PHQ QUESTIONS 1-9: 17
SUM OF ALL RESPONSES TO PHQ QUESTIONS 1-9: 17
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
## 2022-02-01 ASSESSMENT — PATIENT HEALTH QUESTIONNAIRE - PHQ9: SUM OF ALL RESPONSES TO PHQ QUESTIONS 1-9: 17

## 2022-02-02 ENCOUNTER — MYC MEDICAL ADVICE (OUTPATIENT)
Dept: PALLIATIVE MEDICINE | Facility: CLINIC | Age: 57
End: 2022-02-02
Payer: COMMERCIAL

## 2022-02-02 NOTE — TELEPHONE ENCOUNTER
Per patient myChart message:  From: Jamison Breen      Created: 2/2/2022 12:28 PM      Hello, I'm having a bad flare up and can't take my ra meds because I'm still sick with covid and I'm in a extreme amount of pain and have been trying everything I know. I need help.      _________________________________    Mychart sent to pt:  From: Cha Molina RN      Created: 2/2/2022 1:52 PM      Devante Swift,  You you able to see JUANITA Ramon CNP for a virtual appointment on Friday, 2/4/22 @ 130pm?  You are due to see her now too.     Let me know right either way. I have the appointment on hold for you.        SANDRA Eubanks-BSN  [facility]

## 2022-02-02 NOTE — TELEPHONE ENCOUNTER
Per patient myChart message:  To: PAIN NURSE      From: Jamison Breen      Created: 2/2/2022 1:53 PM        Ok      _________________________________    Mychart sent to pt:  From: Cha Molina RN      Created: 2/2/2022 1:59 PM      Okay I have you scheduled for a video visit with JUANITA Ramon CNP on Friday, 2/4/22 @ 130.     Talk to you then,     Cha RN-BSN  [facility]

## 2022-02-03 DIAGNOSIS — F17.200 TOBACCO USE DISORDER: ICD-10-CM

## 2022-02-03 NOTE — TELEPHONE ENCOUNTER
Per patient myChart message:  From: Jamison Breen      Created: 2/2/2022 3:31 PM      Thanks, it's really going to hurt till then. I don't have any other options.       And   From: Jamison Breen      Created: 2/3/2022 11:49 AM      Hi, I'm in kelly much pain. Every joint in my body hurts , I needed help to get dressed and just moving slightly is extremely painful.  I really need something to help with the pain because prednisone is not helping at all.      _________________________________    Mychart sent to pt:    From: Cha Molina RN      Created: 2/3/2022 3:15 PM        Hi Les,  Do you think you need to go to the emergency room?     ASHLEY EubanksN  [facility]             Routed to provider.       DELANO Eubanks  [facility]

## 2022-02-03 NOTE — TELEPHONE ENCOUNTER
Per patient myChart message:  From: Jamison Breen      Created: 2/3/2022 3:17 PM      I thought about it, I am sick with covid still and no way of getting there.          1/3/21: COVID positive test.      Cha RN-BSN  [facility]

## 2022-02-04 ENCOUNTER — VIRTUAL VISIT (OUTPATIENT)
Dept: PALLIATIVE MEDICINE | Facility: CLINIC | Age: 57
End: 2022-02-04
Payer: COMMERCIAL

## 2022-02-04 DIAGNOSIS — M45.7 ANKYLOSING SPONDYLITIS OF LUMBOSACRAL REGION (H): ICD-10-CM

## 2022-02-04 DIAGNOSIS — M25.551 HIP PAIN, RIGHT: ICD-10-CM

## 2022-02-04 DIAGNOSIS — G89.29 CHRONIC BILATERAL LOW BACK PAIN WITH RIGHT-SIDED SCIATICA: ICD-10-CM

## 2022-02-04 DIAGNOSIS — F11.20 UNCOMPLICATED OPIOID DEPENDENCE (H): ICD-10-CM

## 2022-02-04 DIAGNOSIS — M05.79 RHEUMATOID ARTHRITIS INVOLVING MULTIPLE SITES WITH POSITIVE RHEUMATOID FACTOR (H): ICD-10-CM

## 2022-02-04 DIAGNOSIS — M51.369 DDD (DEGENERATIVE DISC DISEASE), LUMBAR: ICD-10-CM

## 2022-02-04 DIAGNOSIS — M54.41 CHRONIC BILATERAL LOW BACK PAIN WITH RIGHT-SIDED SCIATICA: ICD-10-CM

## 2022-02-04 DIAGNOSIS — M54.59 LUMBAR FACET JOINT PAIN: ICD-10-CM

## 2022-02-04 PROCEDURE — 99213 OFFICE O/P EST LOW 20 MIN: CPT | Mod: 95 | Performed by: NURSE PRACTITIONER

## 2022-02-04 RX ORDER — BUPRENORPHINE AND NALOXONE 8; 2 MG/1; MG/1
0.5 FILM, SOLUBLE BUCCAL; SUBLINGUAL 2 TIMES DAILY
Qty: 30 EACH | Refills: 0 | Status: SHIPPED | OUTPATIENT
Start: 2022-02-04 | End: 2022-03-01

## 2022-02-04 RX ORDER — OXYCODONE HYDROCHLORIDE 5 MG/1
5 TABLET ORAL EVERY 6 HOURS PRN
Qty: 40 TABLET | Refills: 0 | Status: SHIPPED | OUTPATIENT
Start: 2022-02-04 | End: 2022-02-15

## 2022-02-04 ASSESSMENT — PAIN SCALES - GENERAL: PAINLEVEL: WORST PAIN (10)

## 2022-02-04 NOTE — PROGRESS NOTES
Patient presents to the clinic today for a follow up with JUANITA Ga CNP  regarding Pain Management.     Jamison is a 56 year old who is being evaluated via a billable video visit.      How would you like to obtain your AVS? MyChart  If the video visit is dropped, the invitation should be resent by: Text to cell phone: [phone]  Will anyone else be joining your video visit? No        Video-Visit Details    Type of service:  Video Visit  Video Start Time: 1:37 PM  Video End Time:2:03 PM    Originating Location (pt. Location): Home    Distant Location (provider location):  [facility]     Platform used for Video Visit: Doximity         Is Pt currently in [place]? Yes    NOTE:  If Pt is not in [place], Appointment needs to be canceled and rescheduled              Ene Mims MA  [facility]         [facility]    2/4/2022        Chief complaint:   - lower  back pain radiates to the groin and down the right leg   -right hip pain  -Bilateral knee pain  -hand pain are also sore      Interval history:   Jailene Breen is a 56 year old male is known to me for   Lumbar spondylosis, pain is worse with extension and rotation indicating a facetogenic component to pain  Lumbar degenerative disc disease  SI joint pain  Ankylosing spondylitis  Myofascial pain  Chronic pain syndrome  PMHx includes: Panic attacks, back pain, arthritis, anxiety, ankylosing spondylitis  PSHx includes: Right knee surgery ×2, left foot surgery right knee arthroscopy        Recommendations/plan at the last visit on 10/20/2021 included:  1. Recommended to increase activity while pacing himself  2. Physical Therapy:  Let's do this once you are done with the PHP program  3. Clinical Health Psychologist: keep working with your psychiatrist and therapist  4. Diagnostic Studies:  None  5. Medication Management:    1. continue chlorzoxazone 500mg three times daily as  needed for muscle spasms  2. restart Suboxone 4/1mg films, use 0.5 film under the tongue every 6 hours, recommend use half film twice daily for first 2 days, then take every 6 hours.    3. Patient certified for medical cannabis in April 2021  6. Further procedures recommended: none at present  7. Recommendations to PCP: see above  8. Follow up: 4-6 weeks via video due to COVID-19 viral threat. Please call [phone] to make your follow-up appointment with me.           Since his last visit, Jailene Breen reports:    Interval history February 4, 2022  -he awoke on 1/1/2022 at 3:30 AM and felt like he was going to die. He called the ambulance and went to the hospital. He was then diagnosed with COVID-19 on 1/3/2022 and has been feeling sick ever since. He has been nauseated and has not been able to take his RA DMARD.   -he has increased joint pain in all of his joints, low back pain and neck pain is worse. He cannot dress himself due to pain. States his finger joints are all swollen. He is using marijuana from [place] Cannabis program and prednisone from  Rheumatology.   -his rheumatologist is out of the clinic for a week. The covering rheumatologist on call felt he could re-start his rheumatology medication. Jamison is not comfortable re-starting his RA medication yet since he is still feeling sweaty and coughing and nauseated.       Interval history October 20, 2021  -he is hurting all over as he previously tapered off of the Suboxone in prep for his hernia surgery. This surgery was postponed due to no beds available due to COVID-19 pandemic. His pain is bad now with no pain medication on board.   -Jamison had preferred to taper off of Suboxone in prep for surgery despite discussing ability to medicate over it and maintain his previous dosing.   -explained that best course of action would be to get him back on Suboxone between 8-16mg/day during the perioperative period (he had been on 16mg/day in divided dosing prior to  "taper).   -Les did not previously feel that Suboxone was very helpful, but he does notice that his pain is worse being off of the Suboxone.   -he had his court case for disability recently and he states that his  feels that it went well, waiting to see if this ruling will be in his favor.         Interval history April 21, 2021  -he had COVID injection on Friday 4/16/2021 this was his 2nd injection. [place] crummy over the weekend.   -he is still dealing with the right hip pain that is the worst pain  -he is having left hip pain as well   -bilateral knee pain as well. It is hard for him to do stairs due to pain.   -he feels that the increase in Suboxone to 4/1mg QID has been a \"titch\" helpful.   -he does not find Voltaren gel or Aspercreme with 4% lidocaine helpful for his knee pain.  -he has finished the partial hospitalization program.  -he will be starting the adult day program for psychiatric care.       Interval history April 6, 2021  -Jamison states he has been feeling like \"hell.\"   -his low back is bothering him and it will radiate into his right groin and down his right leg to the foot.   -he is walking with a cane.   - He was hospitalized twice recently for suicidal ideation. Mood is still low. Denies SI   -he is attending the Partial Hospitalization program. This is all on Zoom. Lasts for 6 hours Monday through Friday.   -he is also working with his psychiatrist and psychologist in addition to that.  -he tries to walk a few times per week, he thinks this is about a half a mile or so.  -he also notes he tries not to go out much to avoid injury or over-activity.   -he is back on Enbrel and methotrexate. He notes that this is a bit helpful for his joint pain.   -he does not get any relief from the Subxone. He states it \"doesn't do anything.\" he relates he has been on OxyContin 20mg TID in the past. He is frustrated that I switched him off of Oxycodone/OxyContin when he was in the hospital for SI. We " "discussed again how being on full  Mu-agonist opiates are not a good option for him. Additionally, when we began working together on 3/31/2017, he was NOT on daily opiates. In the time we have worked together, he has gone from being on no opiates on a daily basis to #15 tabs/month of oxycodone to slowly increasing to Oxycodone 35mg/day in divided dosing using OxyContin 10mg BID and oxycodone 5mg TID PRN which is 52mg OME (oral morphine equivalent, also known as morphine milligram equivalent-MME). In that time, his functional level and his mood has deteriorated despite increasing opiate dosages. -  -discussed I am open to continuing Suboxone and recommend increasing to 4/1mg every 8 hours, a 50% increase. Patient stated that \"that isn't going to be high enough.\" when I asked what he felt might be helpful, he stated he felt the dosage should be tripled. Explained that doing so is not a safe choice. Additionally, discussed that Suboxone hits the peak pain relief (plateaus) at between 16-18mg/day as the mu-receptors are all flooded.   He is concerned re: having upcoming surgery for a hiatal hernia in May. Reviewed that he can be on short-acting opiates as his surgeon deems necessary, but that he would be tapered off of them and continue on Suboxone with me long term.       Interval history February 16, 2021  -He has had more pain since the right  RFA done 2/1/2021.  -he slipped taking his garbage out last week, he tweaked his back, did not fall.    -he is off of his RA meds as he has an upper respiratory infection  -the extreme cold makes his arthritis pain worse  -he may be having a surgery to fix a hiatal hernia  -flexeril caused urinary retention, this was better when he stopped taking it.       Interval history 11/11/2020  -he has been having issues with emptying his bladder and he has had 2 Carrasco catheters in the near future  -he is trying to get in with a [place] Urologist.  -we discussed how opiate medication " can cause urinary hesitancy  -he feels like the urinary symptoms began a couple of weeks after he began lexapro.  -he states he recently found out that his biological dad has had issues with his bladder.   -he had 2nd lumbar medial branch block done today with Dr. Ashlyn Gamble, we are working toward lumbar RFA..       Interval history 2020  -He has been sick 10 days ago,  had been on antibiotics and was not able to take his RA meds. He then felt better and then is now feeling worse again. Had been tested for COVID-19  -how his whole house is not feeling well.   -he is off of his RA meds, and so his pain is worse because he doesn't feel well, he has a really deep cough.  -having 2nd LMBB next week on the left side, then plan to do bilateral lumbar RFA  -he can't sleep due to body aches and then his back pain is worse.   -recently seen by psychiatry for first time this week, placed on escitalopram for depression/anxiety and hydroxyzine for panic/anxiety PRN, Les tells me that the psychiatrist wants him to stop using the clonazepam.       Interval history 6/10/2020  -having bilateral low back pain that radiates into the groin at times  -having multiple joint pain from RA, his RA flare is better as he is back on the Enbrel now  -he would love to get a new bed as he has issues getting comfortable in bed.   -he is having more left sided low back pain, worse with lumbar extension and extension and rotation. He is interested in pursing possible left sided lumbar RFA.      Interval 2020  -his 28 year old niece overdosed and  last Saturday, she had a 12,5 and 1 year old.   -he is going to her  today  -he did get his Enbrel yesterday  -he is going to see a new rheumatologist in July with the [employer]   -he says his pain is now a bit better  -he has multiple joint pain from RA and chronic low back pain        At this point, the patient's participation with our multidisciplinary team includes:  The  "patient has been compliant with the program.    PT - has seen Cyndee White, none recently  Health Psych - worked with  Padilla Keene, last on 11/6/2018.  Working with Jonna Farrell PhD and been seen >8 times since April 2020         Pain scores:    Pain intensity on average is 10 on a scale of 0-10.    Range is 7-10+/10. (his pain can bring him to tears)  Pain right now is 10/10.   Pain is described as \"shooting, throbbing, penetrating, burning, and stabbing.\"  Pain is constant in nature      Current pain relevant medications:      -nortriptyline 50mg at bedtime (helpful)  -Enbrel 50mg/mL --OFF OF THIS RIGHT NOW DUE TO BEING ILL  -ibuprofen 800mg TID PRN (using 3 times daily-helpful)  -Tylenol 500mg using 1000mg TID (unsure if helpful)  -Maxalt 10mg PRN migraine (helpful for migraine--he does have visual aura)  -naloxone nasal spray PRN for accidental opiate overdose  -chlorzoxazone 500mg TID PRN (not using regularly, not much with muscle spasms right now)  -Suboxone 8/2mg use 0.5 film under the tongue twice daily (helps some)      Other pertinent medications:   -clonazepam 1mg tab, may take 0.5-1mg BID PRN anxiety (helpful, has been using infrequently)  Fluoxetine 40mg every day (somewhat helpful, managed by psychiatry)  -quetiapine 150mg at bedtime        Previous Medications:   OPIATES: Oxycodone (helpful), Fentanyl (100mcg/hr patch helpful), hydrocodone (itching), Tramadol (not helpful), Suboxone (initially felt it was not helpful, but after he tapered off of it due to his preference prior to surgery, he now feels that it was helpful for his pain)  NSAIDS: ibuprofen (not helpful), Aleve (not helpful)  MUSCLE RELAXANTS: methocarbamol (somewhat helpful), Flexeril (somewhat helpful but caused severe urinary retention requiring catheterization), tizanidine (unsure if helpful), metaxalone (unsure), SOMA (helpful)  ANTI-MIGRAINE MEDS: Maxalt (helpful for migraine), Imitrex injection (somewhat " helpful)  ANTI-DEPRESSANTS: Prozac (helpful), Cymbalta (felt weird on it), nortriptyline (unsure if helpful), Buspar (unsure), Remeron (blacked out), bupropion (not helpful for smoking cessation)  SLEEP AIDS: Ambien (helpful)  ANTI-CONVULSANTS: gabapentin (felt odd on this medication, unsure of dose), Lyrica (not helpful for 4 months, unsure of dose), Topamax (not helpful, he felt weird on it)   TOPICALS: Lidocaine patches (not helpful), Voltaren gel (not helpful)  Other meds: Tylenol (unsure if helpful)        Other treatments have included:   Jailene JOAQUÍN Breen has been seen at a pain clinic in the past.  [facility]  PT: tried, not helpful  Chiropractic: tried, not helpful  Acupuncture: none  TENs Unit: tried, helpful         Injections:     -has had injections at outside clinics  -has had epidural injections for low back pain (one was helpful)  -he has had lumbar medial branch blocks at [facility] and he was going to have lumbar radiofrequency ablation (did not do this due to cost)  -4/3/2017 right LMBBs with Dr. Ashlyn Gamble (helpful)  -4/26/2017 right LMBBs with Dr. Ashlyn Gamble (helpful)  -5/31/2017 right L3, L4, L5 RFA with Dr. Ashlyn Gamble (good relief for over 6 months)  -3/15/2018 right L5 transforaminal MAKAYLA with Dr. Ashlyn Gamble (not helpful)  -5/10/2018 trigger point injections with Dr. Ashlyn Gamble(somewhat helpful).  -7/12/2018 Right L3, L4, L5 RFA with Dr. Ashlyn Gamble (helpful).  -9/20/2018 Left piriformis injections, bilateral gluteal trigger point with Dr. Gamble (helpful).  -1/31/2019 right L2-3, L3-4 and L4-5 facet joint injections with Dr. Ashlyn Gamble (somewhat helpful)  4/4/2019  right L3, L4, L5/sacral ala lumbar radiofrequency ablation with Dr. Gamble (helpful over right side)  6/28/2019 Bilateral facet joint injections at l4-5, L5-S1 with Dr. Holley (only helpful for 7 days)  -2/12/2020 right F1-I9-H2-sacral ALA RFA done with Dr. Ashlyn Gamble (helpful)  -8/26/2020  "left L3-5 lumbar medial branch blocks #1 with Dr. Ashlyn Gamble (very helpful)  -11/11/2020 left L3-5 lumbar medial branch blocks #2 with Dr. Ashlyn Gamble (too soon to tell, done today helpful)   -2/1/2021 right lumbar RFA L4-5 and L5-S1 with Dr. Ashlyn Gamble   (this \"helped a little bit\" and then he tweaked his back about 2 weeks after it was done and it wasn't helpful)            Side Effects: no side effect  Patient is using the medication as prescribed: YES    Medications:  Current Outpatient Medications   Medication Sig Dispense Refill     acetaminophen (TYLENOL) 500 MG tablet Take 1,000 mg by mouth every 6 hours as needed for mild pain (headache)       buprenorphine HCl-naloxone HCl (SUBOXONE) 8-2 MG per film Place 0.5 Film under the tongue 2 times daily Max 1 film per day. Fill/begin now to last 30 days for chronic pain 30 each 0     etanercept (ENBREL SURECLICK) 50 MG/ML autoinjector Inject 50 mg Subcutaneous once a week 1 mL 3     FLUoxetine (PROZAC) 40 MG capsule Take 1 capsule (40 mg) by mouth daily 30 capsule 2     folic acid (FOLVITE) 1 MG tablet Take 5 tablets (5 mg) by mouth daily 150 tablet 3     liothyronine (CYTOMEL) 25 MCG tablet Take 1 tablet (25 mcg) by mouth daily 30 tablet 2     medical cannabis (Patient's own supply) See Admin Instructions (The purpose of this order is to document that the patient reports taking medical cannabis.  This is not a prescription, and is not used to certify that the patient has a qualifying medical condition.)     Pills PRN   Lozenges PRN       methotrexate sodium 2.5 MG TABS TAKE 4 TABLETS BY MOUTH ONCE A WEEK 48 tablet 0     nicotine polacrilex (NICORETTE) 4 MG gum PLACE AND PARK ONE PIECE INSIDE CHEEK AS NEEDED FOR SMOKING CESSATION 200 each 1     predniSONE (DELTASONE) 5 MG tablet Take 1 tablet (5 mg) by mouth daily 100 tablet 1     QUEtiapine (SEROQUEL) 50 MG tablet Take 1 tablet (50 mg) by mouth At Bedtime May take additional 25-50mg (1/2 - 1 tablet) daily " prn for anxiety/panic attacks 45 tablet 2       Medical History: any changes in medical history since they were last seen? No    Social History:    Home situation: , has 2 grown children  Occupation/Schooling: currently unemployed, worked as a  (unemployed since 3/1/2017). 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  Uses nicotine gum  If yes, was cessation counseling offered?  None needed        Physical Exam:     Vital signs: There were no vitals taken for this visit.     Behavioral observations:  Awake, alert. Cooperative.   Pulm: respirations easy and unlabored. Able to speak in full sentences without SOB or cough noted.        [place] Prescription Monitoring Program:  Reviewed [place] [facility] 2/4/2022- no concerning fills.  Susana GRANT RN CNP, FNP  [facility]        DIRE Score for selecting candidates for long term opioid analgesia for chronic pain:  Diagnosis  2  Intractablility  2  Risk    Psychological health  1    Chemical health  2    Reliability  2    Social support  2  Efficacy  1    Total DIRE Score = 12. Note that  7-13 predicts poor outcome (compliance and efficacy) from opioid prescribing; 14-21 predicts good outcome (compliance and efficacy)  from opioid prescribing.      Assessment:    1. Uncomplicated opiate dependence  2. Right hip pain  3. Chronic bilateral low back pain with right sided sciatica  4. Lumbar facet joint pain    5. DDD, lumbar  6. Ankylosing spondylitis of lumbosacral region  7. RA involving multiple joints    8. Encounter for long-term opiate analgesic use  9. PMHx includes: Panic attacks, back pain, arthritis, anxiety, ankylosing spondylitis  10. PSHx includes: Right knee surgery ×2, left foot surgery right knee arthroscopy      Plan:     1. Recommended to increase activity while pacing himself  2. Physical Therapy:  Let's do this once you are done with the PHP program  3. Clinical Health Psychologist: keep working with your psychiatrist and therapist  4. Diagnostic Studies:  None  5. Medication Management:    1. restart Suboxone 8/2mg films, use 0.5 film under the tongue Q 12 hours.   2. Oxycodone for pain flare since he is off of his biologics  3. Patient certified for medical cannabis in April 2021  6. Further procedures recommended: none at present  7. Recommendations to PCP: see above  8. Follow up: 6-8 weeks via video due to COVID-19 viral threat. Please call [phone] to make your follow-up appointment with me.                 Susana GRANT, RN CNP, FNP  [facility]  [facility] [place]

## 2022-02-12 ENCOUNTER — HEALTH MAINTENANCE LETTER (OUTPATIENT)
Age: 57
End: 2022-02-12

## 2022-02-12 ENCOUNTER — MYC MEDICAL ADVICE (OUTPATIENT)
Dept: PALLIATIVE MEDICINE | Facility: CLINIC | Age: 57
End: 2022-02-12
Payer: COMMERCIAL

## 2022-02-12 DIAGNOSIS — M25.551 HIP PAIN, RIGHT: ICD-10-CM

## 2022-02-12 DIAGNOSIS — M51.369 DDD (DEGENERATIVE DISC DISEASE), LUMBAR: ICD-10-CM

## 2022-02-12 DIAGNOSIS — G89.29 CHRONIC BILATERAL LOW BACK PAIN WITH RIGHT-SIDED SCIATICA: ICD-10-CM

## 2022-02-12 DIAGNOSIS — M54.41 CHRONIC BILATERAL LOW BACK PAIN WITH RIGHT-SIDED SCIATICA: ICD-10-CM

## 2022-02-12 DIAGNOSIS — M05.79 RHEUMATOID ARTHRITIS INVOLVING MULTIPLE SITES WITH POSITIVE RHEUMATOID FACTOR (H): ICD-10-CM

## 2022-02-12 DIAGNOSIS — M54.59 LUMBAR FACET JOINT PAIN: ICD-10-CM

## 2022-02-12 DIAGNOSIS — M45.7 ANKYLOSING SPONDYLITIS OF LUMBOSACRAL REGION (H): ICD-10-CM

## 2022-02-14 ENCOUNTER — VIRTUAL VISIT (OUTPATIENT)
Dept: PSYCHIATRY | Facility: CLINIC | Age: 57
End: 2022-02-14
Attending: PSYCHOLOGIST
Payer: COMMERCIAL

## 2022-02-14 DIAGNOSIS — F99 INSOMNIA DUE TO OTHER MENTAL DISORDER: ICD-10-CM

## 2022-02-14 DIAGNOSIS — F41.1 GAD (GENERALIZED ANXIETY DISORDER): ICD-10-CM

## 2022-02-14 DIAGNOSIS — F51.05 INSOMNIA DUE TO OTHER MENTAL DISORDER: ICD-10-CM

## 2022-02-14 DIAGNOSIS — F33.2 MAJOR DEPRESSIVE DISORDER, RECURRENT SEVERE WITHOUT PSYCHOTIC FEATURES (H): Primary | ICD-10-CM

## 2022-02-14 PROCEDURE — 90837 PSYTX W PT 60 MINUTES: CPT | Mod: 95 | Performed by: STUDENT IN AN ORGANIZED HEALTH CARE EDUCATION/TRAINING PROGRAM

## 2022-02-14 ASSESSMENT — ANXIETY QUESTIONNAIRES
3. WORRYING TOO MUCH ABOUT DIFFERENT THINGS: NEARLY EVERY DAY
4. TROUBLE RELAXING: NEARLY EVERY DAY
7. FEELING AFRAID AS IF SOMETHING AWFUL MIGHT HAPPEN: MORE THAN HALF THE DAYS
6. BECOMING EASILY ANNOYED OR IRRITABLE: NEARLY EVERY DAY
3. WORRYING TOO MUCH ABOUT DIFFERENT THINGS: NEARLY EVERY DAY
GAD7 TOTAL SCORE: 20
2. NOT BEING ABLE TO STOP OR CONTROL WORRYING: NEARLY EVERY DAY
GAD7 TOTAL SCORE: 20
GAD7 TOTAL SCORE: 20
5. BEING SO RESTLESS THAT IT IS HARD TO SIT STILL: NEARLY EVERY DAY
7. FEELING AFRAID AS IF SOMETHING AWFUL MIGHT HAPPEN: MORE THAN HALF THE DAYS
4. TROUBLE RELAXING: NEARLY EVERY DAY
5. BEING SO RESTLESS THAT IT IS HARD TO SIT STILL: NEARLY EVERY DAY
6. BECOMING EASILY ANNOYED OR IRRITABLE: NEARLY EVERY DAY
GAD7 TOTAL SCORE: 20
2. NOT BEING ABLE TO STOP OR CONTROL WORRYING: NEARLY EVERY DAY
GAD7 TOTAL SCORE: 20
GAD7 TOTAL SCORE: 20
1. FEELING NERVOUS, ANXIOUS, OR ON EDGE: NEARLY EVERY DAY
7. FEELING AFRAID AS IF SOMETHING AWFUL MIGHT HAPPEN: MORE THAN HALF THE DAYS
1. FEELING NERVOUS, ANXIOUS, OR ON EDGE: NEARLY EVERY DAY
7. FEELING AFRAID AS IF SOMETHING AWFUL MIGHT HAPPEN: MORE THAN HALF THE DAYS

## 2022-02-14 ASSESSMENT — PATIENT HEALTH QUESTIONNAIRE - PHQ9
SUM OF ALL RESPONSES TO PHQ QUESTIONS 1-9: 17
SUM OF ALL RESPONSES TO PHQ QUESTIONS 1-9: 17
10. IF YOU CHECKED OFF ANY PROBLEMS, HOW DIFFICULT HAVE THESE PROBLEMS MADE IT FOR YOU TO DO YOUR WORK, TAKE CARE OF THINGS AT HOME, OR GET ALONG WITH OTHER PEOPLE: VERY DIFFICULT

## 2022-02-14 NOTE — TELEPHONE ENCOUNTER
Per patient myChart message:  From: Jamison Breen      Created: 2/14/2022 2:55 PM      Yes , I'm still off them and I use coborns in Perry Eubanks RN-BSN  [facility]

## 2022-02-14 NOTE — TELEPHONE ENCOUNTER
Per patient myChart message:  From: Jamison Breen      Created: 2/12/2022 8:14 AM      Hello, could you please refill the pain medication one more week. I'm still pretty sick and hurting real bad. I did see a doctor about being sick and he said I could possibly have long haul covid. I think it is slowly getting better though so hopefully I'll get over it soon.      ______________    JUANITA Ramon CNP's 2/4/22 visit notes aren't done.  Oxycodone was prescribed:    oxyCODONE (ROXICODONE) 5 MG tablet 40 tablet 0 2/4/2022  --   Sig - Route: Take 1 tablet (5 mg) by mouth every 6 hours as needed for severe pain Max of 4 tabs per day. Fill/begin 2/4/2022. This is for an acute flare of RA pain while off his chronic RA meds and has been sick.    _________________________________    Mychart sent to pt:  From: Cha Molina RN      Created: 2/14/2022 2:54 PM      Hi Susana Swift APRN CNP is out of office so will have covering providers review this.     1. Are you still off of your RA meds?  2. What pharmacy do you want to use?        SANDRA Eubanks-BSN  [facility]

## 2022-02-14 NOTE — TELEPHONE ENCOUNTER
Routed in error to provider pool.    Rotued to JUANITA Ramon CNP to review.      Cha RN-BSN  [facility]

## 2022-02-14 NOTE — PROGRESS NOTES
"VIDEO VISIT  Jailene Breen is a 56 year old patient who is being evaluated via a billable video visit.      The patient has been notified of following:   \"We have found that certain health care needs can be provided without the need for an in-person physical exam. This service lets us provide the care you need with a video conversation. If a prescription is necessary we can send it directly to your pharmacy. If lab work is needed we can place an order for that and you can then stop by our lab to have the test done at a later time. Insurers are generally covering virtual visits as they would in-office visits so billing should not be different than normal.  If for some reason you do get billed incorrectly, you should contact the billing office to correct it and that number is in the AVS .    Patient has given verbal consent for video visit?: Yes   How would you like to obtain your AVS?: Capt'nSocial  AVS SmartPhrase [PsychAVS] has been placed in 'Patient Instructions': Yes      Video- Visit Details  Type of service:  video visit for psychotherapy  Time of service:    Date:  2/14/22    Video Start Time:  9 AM      Video End Time:  955 AM    Reason for video visit:  Patient unable to travel due to Covid-19  Originating Site (patient location):  [facility]   Location- Patient's home  Distant Site (provider location):  Remote location  Mode of Communication:  Video Conference via AmWell  Consent:  Patient has given verbal consent for video visit?: Yes       Clinician Contact & Progress Note  Department of Psychiatry & Behavioral Sciences  [facility] [place] [facility]    Patient: Jailene Breen (1965)     MRN: [id number]  Date of Treatment:  Feb 14, 2022  Duration of Treatment: Start Time: 9:00 AM; End Time: 9:55 AM  Provider: Franklin Breen MD     People present:   Provider: Franklin Cherry  Patient: Jailene Breen    Encounter Diagnoses   Name Primary?     Major depressive disorder, recurrent " "severe without psychotic features (H) Yes     JEFF (generalized anxiety disorder)      Insomnia due to other mental disorder        Assessment (current symptoms):  Overall doing well, currently in moderate depressed episode with no suicidal ideation. Struggling with RA pain flare up due to not taking meds due to Covid. Discussed illness identity and his feelings around asking for help. Also continued discussion about his relationship with his mother.     PHQ 2/14/2022 2/21/2022 2/28/2022   PHQ-9 Total Score 17 19 19   Q9: Thoughts of better off dead/self-harm past 2 weeks More than half the days Nearly every day More than half the days   F/U: Thoughts of suicide or self-harm Yes Yes Yes   F/U: Self harm-plan No No No   F/U: Self-harm action No No No   F/U: Safety concerns No No No     JEFF-7 SCORE 1/24/2022 2/14/2022 2/14/2022   Total Score - - -   Total Score 9 (mild anxiety) - 20 (severe anxiety)   Total Score 9 20 20       Session Content:     - \"really rough, pain levels through the roof\", still not taking RA meds due to Covid infection, surgery.   - When I wake up \"it's like a shock, everything hurts, I don't know what to do\". Trying to move as much as possible.   - Rheum doctor on-call thought it would be ok to restart DMARD, but he was scared to take it due to still feeling sick. Will check in with pre-op physician  - Says it is easier to ask for help now, e.g. this morning daughter had to help open pill bottles. \"I don't have a choice, I have to ask for help\"  - Reports he is also behind on medical bills  - insurance is changing in April 1st to medicare    - \"every once in a while something reminds me of my mom, and I just think when she dies I'll feel terrible. I can't talk to her though, she's toxic\"  - Reports she saw mom in a store last week, she \"walked past nose in the air\". This was the first time he had seen her in over a year. \"Felt really sad, bummed out the rest of the day\". \"can't call her and " "apologize because I didn't do anything wrong. She would just walk all over me\".   - His mom reportedly does not treat her other children this way. Les's half sister stopped talking to him as well, thinks she took mom's side and word against Les     - Discussed the two opposite sets of beliefs he has about his mother  - Discussed if wife knows that he still wants a reciprocal mother-son relationship. Talking about his mom is \"taboo\", has led to arguments, \"I don't think she knows how hard it is to not talk to your mom ever again. She thinks it would be easier to have her out of my life\".          Answers for HPI/ROS submitted by the patient on 2/14/2022  If you checked off any problems, how difficult have these problems made it for you to do your work, take care of things at home, or get along with other people?: Very difficult  PHQ9 TOTAL SCORE: 17  JEFF 7 TOTAL SCORE: 20    Treatment Plan:   1. Depression- be more active (doing things around the house), more motivation, confidence in self, go back to school (degree in psychology)    2. Relationship with bio parents- have a conversation with mom without fighting, give her a hug. Have deeper conversations with bio dad, feel more like a son.     3. Feelings of worthlessness, guilt, self-blame- Less shame, more future oriented    Mental Status Exam:  Alertness: alert  and oriented  Appearance: well groomed  Behavior/Demeanor: cooperative, pleasant and calm, with good  eye contact   Speech: normal  Language: intact. Preferred language identified as English.  Psychomotor: normal or unremarkable  Mood: depressed and anxious  Affect: blunted and appropriate; was congruent to mood; was congruent to content  Thought Process/Associations: unremarkable  Thought Content:  Reports none;  Denies suicidal and violent ideation and delusions  -Suicidal ideation: denies SI, denies intent, and denies plan  -Homicidal Ideation: denies  Perception: Reports none;  Denies auditory " "hallucinations and visual hallucinations; intact    Insight: good  Judgment: good  Cognition: does  appear grossly intact    Billing for \"Interactive Complexity\"?    No    Franklin Breen MD                 "

## 2022-02-15 RX ORDER — OXYCODONE HYDROCHLORIDE 5 MG/1
5 TABLET ORAL EVERY 6 HOURS PRN
Qty: 40 TABLET | Refills: 0 | Status: SHIPPED | OUTPATIENT
Start: 2022-02-15 | End: 2022-02-24

## 2022-02-15 ASSESSMENT — ANXIETY QUESTIONNAIRES
GAD7 TOTAL SCORE: 20
GAD7 TOTAL SCORE: 20

## 2022-02-15 ASSESSMENT — PATIENT HEALTH QUESTIONNAIRE - PHQ9: SUM OF ALL RESPONSES TO PHQ QUESTIONS 1-9: 17

## 2022-02-15 NOTE — TELEPHONE ENCOUNTER
[employer] message sent with Rx approval from provider.  Fredi  Pain Clinic Management Team

## 2022-02-15 NOTE — TELEPHONE ENCOUNTER
Signed Prescriptions:                        Disp   Refills    oxyCODONE (ROXICODONE) 5 MG tablet         40 tab*0        Sig: Take 1 tablet (5 mg) by mouth every 6 hours as needed           for severe pain Max of 4 tabs per day. Fill/begin           2/15/2022. This is for an acute flare of RA pain           while off his chronic RA meds and has been sick.  Authorizing Provider: SUSANA PETTY    Reviewed [place]  February 15, 2022- no concerning fills.    Susana GRANT, RN CNP, FNP  [facility]  [facility] [place]

## 2022-02-16 ENCOUNTER — OFFICE VISIT (OUTPATIENT)
Dept: FAMILY MEDICINE | Facility: CLINIC | Age: 57
End: 2022-02-16
Payer: COMMERCIAL

## 2022-02-16 VITALS
TEMPERATURE: 98.6 F | BODY MASS INDEX: 32.88 KG/M2 | DIASTOLIC BLOOD PRESSURE: 82 MMHG | SYSTOLIC BLOOD PRESSURE: 132 MMHG | HEIGHT: 76 IN | HEART RATE: 70 BPM | RESPIRATION RATE: 18 BRPM | OXYGEN SATURATION: 97 % | WEIGHT: 270 LBS

## 2022-02-16 DIAGNOSIS — Z01.818 PREOP GENERAL PHYSICAL EXAM: Primary | ICD-10-CM

## 2022-02-16 DIAGNOSIS — J01.90 ACUTE SINUSITIS WITH SYMPTOMS > 10 DAYS: ICD-10-CM

## 2022-02-16 DIAGNOSIS — M06.9 RHEUMATOID ARTHRITIS INVOLVING MULTIPLE SITES, UNSPECIFIED WHETHER RHEUMATOID FACTOR PRESENT (H): ICD-10-CM

## 2022-02-16 DIAGNOSIS — K44.9 HIATAL HERNIA: ICD-10-CM

## 2022-02-16 PROCEDURE — 99214 OFFICE O/P EST MOD 30 MIN: CPT | Performed by: PHYSICIAN ASSISTANT

## 2022-02-16 RX ORDER — DOXYCYCLINE 100 MG/1
100 TABLET ORAL EVERY 12 HOURS
Qty: 14 TABLET | Refills: 0 | Status: SHIPPED | OUTPATIENT
Start: 2022-02-16 | End: 2022-02-23

## 2022-02-16 NOTE — PROGRESS NOTES
[address] [address]  Phone: [phone]  Primary Provider: Lindsay Resendez  Pre-op Performing Provider: LINDSAY RESENDEZ      PREOPERATIVE EVALUATION:  Today's date: 2/16/2022    Jailene Breen is a 56 year old male who presents for a preoperative evaluation.    Surgical Information:  Surgery/Procedure:ROBOTIC X ASSISTED LAPAROSCOPIC HIATAL HERNIA REPAIR WITH PARTIAL FUNDOPLICATION AND POSSIBLE MESH PLACEMENT  Surgery Location: [facility]  Surgeon:Martina ENGLISH  Surgery Date: 1:00 PM  Time of Surgery:  3/4/22  Where patient plans to recover: Other: Stay at the hospital  Fax number for surgical facility: [phone]    Type of Anesthesia Anticipated: General    Assessment & Plan     The proposed surgical procedure is considered INTERMEDIATE risk.    Problem List Items Addressed This Visit     Rheumatoid arthritis involving multiple sites, unspecified rheumatoid factor presence      Other Visit Diagnoses     Preop general physical exam    -  Primary    Acute sinusitis with symptoms > 10 days        Relevant Medications    doxycycline monohydrate (ADOXA) 100 MG tablet    Hiatal hernia                   Risks and Recommendations:  The patient has the following additional risks and recommendations for perioperative complications:   - No identified additional risk factors other than previously addressed    Medication Instructions:   - nicotine gum: Take up to two hours before surgery   - Suboxone: Continue without modification. Notify Suboxone prescriber of upcoming surgery, patient will need an individualized perioperative plan.   (they are aware)  Will continue to hold enbrel but can restart methotrexate ( unless rheum feels otherwise)  after completing doxy prior to surgery.      RECOMMENDATION:  APPROVAL GIVEN to proceed with proposed procedure, without further diagnostic evaluation.    Review of prior  external note(s) from - Outside records from gen surg and pain med   Diagnosis or treatment significantly limited by social determinants of health - tobacco use history    26 minutes spent on the date of the encounter doing chart review, history and exam, documentation and further activities per the note        Subjective     HPI related to upcoming procedure: Has been following w/Gen Surg for symptoms believe related to GERD exacerbated by hiatal hernia.  Surgery needed to be resched several times due to general covid coditions,and more recently due to + covid exposure and then + test 1/4.   He had been off his DMards due to covid (Rheum said could restar tbut patient doesn't want to yet) and had tapered off his suboxone as well - though this also restarted.  Not  On recurrent cammie    Has been having mild persistent cough, sinus drianage, bloody nose this am.    Preop Questions 2/16/2022   1. Have you ever had a heart attack or stroke? No   2. Have you ever had surgery on your heart or blood vessels, such as a stent placement, a coronary artery bypass, or surgery on an artery in your head, neck, heart, or legs? No   3. Do you have chest pain with activity? No   4. Do you have a history of  heart failure? No   5. Do you currently have a cold, bronchitis or symptoms of other infection? see above   6. Do you have a cough, shortness of breath, or wheezing? No   7. Do you or anyone in your family have previous history of blood clots? No   8. Do you or does anyone in your family have a serious bleeding problem such as prolonged bleeding following surgeries or cuts? No   9. Have you ever had problems with anemia or been told to take iron pills? No   10. Have you had any abnormal blood loss such as black, tarry or bloody stools? No   11. Have you ever had a blood transfusion? No   12. Are you willing to have a blood transfusion if it is medically needed before, during, or after your surgery? Yes   13. Have you or any of  your relatives ever had problems with anesthesia? No   14. Do you have sleep apnea, excessive snoring or daytime drowsiness? No   15. Do you have any artifical heart valves or other implanted medical devices like a pacemaker, defibrillator, or continuous glucose monitor? No   16. Do you have artificial joints? No   17. Are you allergic to latex? No       Health Care Directive:  Patient does not have a Health Care Directive or Living Will: Discussed advance care planning with patient; information given to patient to review.    Preoperative Review of :   reviewed - controlled substances reflected in medication list.       Review of Systems  Constitutional, neuro, ENT, endocrine, pulmonary, cardiac, gastrointestinal, genitourinary, musculoskeletal, integument and psychiatric systems are negative, except as otherwise noted.    Patient Active Problem List    Diagnosis Date Noted     Major depressive disorder, recurrent episode, severe with mixed features (H) 04/01/2021     Priority: Medium     Insomnia due to other mental disorder 03/19/2021     Priority: Medium     Suicidal ideation 03/16/2021     Priority: Medium     Immunocompromised (H) 01/13/2021     Priority: Medium     Moderate major depression (H) 10/06/2020     Priority: Medium     Tobacco use disorder 06/19/2017     Priority: Medium     Ankylosing spondylitis (H) 03/01/2017     Priority: Medium     Generalized anxiety disorder 10/31/2016     Priority: Medium     Panic attack 10/31/2016     Priority: Medium     Right-sided thoracic back pain, unspecified chronicity 10/10/2016     Priority: Medium     Tear meniscus knee, right, initial encounter 09/15/2016     Priority: Medium     Rheumatoid arthritis involving multiple sites, unspecified rheumatoid factor presence 09/15/2016     Priority: Medium     IMO Regulatory Load OCT 2020       Essential hypertension with goal blood pressure less than 140/90 11/21/2013     Priority: Medium     CARDIOVASCULAR  SCREENING; LDL GOAL LESS THAN 160 03/06/2012     Priority: Medium     Obesity, Class I, BMI 30-34.9 03/06/2012     Priority: Medium     Chronic pain 09/04/2011     Priority: Medium      Past Medical History:   Diagnosis Date     Ankylosing spondylitis (H) 03/01/2017     Anxiety      Arthritis     back, knees     Back pain 11/17/2013    possible drug seeking behavior in the past.     Gastroesophageal reflux disease      Hypertension      Overweight (BMI 25.0-29.9) 03/06/2012     Panic attacks      Syncope, unspecified syncope type 02/27/2017     Past Surgical History:   Procedure Laterality Date     ARTHROSCOPY KNEE Right 09/22/2016    Procedure: ARTHROSCOPY KNEE;  Surgeon: Fredo Hughes MD;  Location: [facility] OR     COMBINED ESOPHAGOSCOPY, GASTROSCOPY, DUODENOSCOPY (EGD) WITH CO2 INSUFFLATION N/A 01/19/2021    Procedure: ESOPHAGOGASTRODUODENOSCOPY, WITH CO2 INSUFFLATION;  Surgeon: Brandon Mack MD;  Location: [facility] OR     ESOPHAGOSCOPY, GASTROSCOPY, DUODENOSCOPY (EGD), COMBINED N/A 01/19/2021    Procedure: Esophagogastroduodenoscopy, With Biopsy;  Surgeon: Brandon Mack MD;  Location: [facility] OR     ESOPHAGOSCOPY, GASTROSCOPY, DUODENOSCOPY (EGD), COMBINED N/A 05/27/2021    Procedure: ESOPHAGOGASTRODUODENOSCOPY (EGD);  Surgeon: Chester Lynn MD;  Location: [facility]     INJECT PARAVERTEBRAL FACET JOINT LUMBAR / SACRAL FIRST Right 11/25/2016    Procedure: INJECT PARAVERTEBRAL FACET JOINT LUMBAR / SACRAL FIRST;  Surgeon: Doretha Magallanes MD;  Location: [facility] OR     ORTHOPEDIC SURGERY Right     knee     PLANTAR FASCIA RELEASE Left      Current Outpatient Medications   Medication Sig Dispense Refill     acetaminophen (TYLENOL) 500 MG tablet Take 1,000 mg by mouth every 6 hours as needed for mild pain (headache)       buprenorphine HCl-naloxone HCl (SUBOXONE) 8-2 MG per film Place 0.5 Film under the tongue 2 times daily Max 1 film per day. Fill 2/4/22 and start 2/5/22 to last 30 days for chronic pain 30  "each 0     doxycycline monohydrate (ADOXA) 100 MG tablet Take 1 tablet (100 mg) by mouth every 12 hours for 7 days 14 tablet 0     FLUoxetine (PROZAC) 40 MG capsule Take 1 capsule (40 mg) by mouth daily 30 capsule 2     folic acid (FOLVITE) 1 MG tablet Take 5 tablets (5 mg) by mouth daily 150 tablet 3     liothyronine (CYTOMEL) 25 MCG tablet Take 1 tablet (25 mcg) by mouth daily 30 tablet 2     medical cannabis (Patient's own supply) See Admin Instructions (The purpose of this order is to document that the patient reports taking medical cannabis.  This is not a prescription, and is not used to certify that the patient has a qualifying medical condition.)     Pills PRN   Lozenges PRN       nicotine polacrilex (NICORETTE) 4 MG gum PLACE AND PARK ONE PIECE INSIDE CHEEK AS NEEDED FOR SMOKING CESSATION 200 each 1     oxyCODONE (ROXICODONE) 5 MG tablet Take 1 tablet (5 mg) by mouth every 6 hours as needed for severe pain Max of 4 tabs per day. Fill/begin 2/15/2022. This is for an acute flare of RA pain while off his chronic RA meds and has been sick. 40 tablet 0     predniSONE (DELTASONE) 5 MG tablet Take 1 tablet (5 mg) by mouth daily 100 tablet 1     QUEtiapine (SEROQUEL) 50 MG tablet Take 1 tablet (50 mg) by mouth At Bedtime May take additional 25-50mg (1/2 - 1 tablet) daily prn for anxiety/panic attacks 45 tablet 2     etanercept (ENBREL SURECLICK) 50 MG/ML autoinjector Inject 50 mg Subcutaneous once a week 1 mL 3     methotrexate sodium 2.5 MG TABS TAKE 4 TABLETS BY MOUTH ONCE A WEEK 48 tablet 0       Allergies   Allergen Reactions     Mirtazapine      Other reaction(s): Coma     Hydrocodone Itching     Ms Contin [Morphine] Itching     Remeron Soltab Other (See Comments)     \"Blacked out\" for one week        Social History     Tobacco Use     Smoking status: Never Smoker     Smokeless tobacco: Former User     Types: Chew     Tobacco comment: still chews nicotine gum   Substance Use Topics     Alcohol use: No     " "Comment: none       History   Drug Use     Frequency: 7.0 times per week     Types: Marijuana     Comment: medicinal (oral)         Objective     /82   Pulse 70   Temp 98.6  F (37  C) (Tympanic)   Resp 18   Ht 1.93 m (6' 4\")   Wt 122.5 kg (270 lb)   SpO2 97%   BMI 32.87 kg/m      Physical Exam    GENERAL APPEARANCE: healthy, alert and no distress     EYES: EOMI,  PERRL     HENT: ear canals and TM's normal and nose and mouth without ulcers or lesions     NECK: no adenopathy, no asymmetry, masses, or scars       RESP: lungs clear to auscultation - no rales, rhonchi or wheezes     CV: regular rates and rhythm, normal S1 S2, no S3 or S4 and no murmur, click or rub     ABDOMEN:  soft, nontender, no HSM or masses and bowel sounds normal     MS: extremities normal- no gross deformities noted, no evidence of inflammation in joints, FROM in all extremities.     SKIN: no suspicious lesions or rashes     NEURO: Normal strength and tone, sensory exam grossly normal, mentation intact and speech normal     PSYCH: mentation appears normal. and affect normal/bright     L     Recent Labs   Lab Test 10/11/21  1123 06/16/21  0741   HGB 16.2 14.6    166    141   POTASSIUM 4.3 4.2   CR 0.91 1.09   A1C 5.2  --         Diagnostics:  No labs were ordered during this visit.   No EKG required, no history of coronary heart disease, significant arrhythmia, peripheral arterial disease or other structural heart disease.    Revised Cardiac Risk Index (RCRI):  The patient has the following serious cardiovascular risks for perioperative complications:   - No serious cardiac risks = 0 points     RCRI Interpretation: 0 points: Class I (very low risk - 0.4% complication rate)           Signed Electronically by: JASON Rice  Reviewed and agree with assessment and plan above. Meño Swan MD    Copy of this evaluation report is provided to requesting physician.      "

## 2022-02-16 NOTE — PATIENT INSTRUCTIONS
At [facility], we strive to deliver an exceptional experience to you, every time we see you. If you receive a survey, please complete it as we do value your feedback.  If you have MyChart, you can expect to receive results automatically within 24 hours of their completion.  Your provider will send a note interpreting your results as well.   If you do not have MyChart, you should receive your results in about a week by mail.    Your care team:                            Family Medicine Internal Medicine   MD Paul Coy MD Shantel Branch-Fleming, MD Srinivasa Vaka, MD Katya Belousova, PALEYDI Dillon, APRN CNP    Meño Swan, MD Pediatrics   Ruperto Resendez, PALEYDI Jimenez, CNP MD Dottie Minor APRN CNP   MD Paula Negrete MD Deborah Mielke, MD Phoebe Jones, APRN [facility]      Clinic hours: Monday - Thursday 7 am-6 pm; Fridays 7 am-5 pm.   Urgent care: Monday - Friday 10 am- 8 pm; Saturday and Sunday 9 am-5 pm.    Clinic: [phone]       [facility]: Monday - Thursday 8 am - 7 pm; Friday 8 am - 6 pm  [facility]: [phone]     Use [email or web address] for 24/7 diagnosis and treatment of dozens of conditions.  Preparing for Your Surgery  Getting started  A nurse will call you to review your health history and instructions. They will give you an arrival time based on your scheduled surgery time. Please be ready to share:    Your doctor's clinic name and phone number    Your medical, surgical and anesthesia history    A list of allergies and sensitivities    A list of medicines, including herbal treatments and over-the-counter drugs    Whether the patient has a legal guardian (ask how to send us the papers in advance)  Please tell us if you're pregnant--or if there's any chance you might be pregnant. Some surgeries may injure a fetus (unborn baby), so they require a  pregnancy test. Surgeries that are safe for a fetus don't always need a test, and you can choose whether to have one.   If you have a child who's having surgery, please ask for a copy of Preparing for Your Child's Surgery.    Preparing for surgery    Within 30 days of surgery: Have a pre-op exam (sometimes called an H&P, or History and Physical). This can be done at a clinic or pre-operative center.  ? If you're having a , you may not need this exam. Talk to your care team.    At your pre-op exam, talk to your care team about all medicines you take. If you need to stop any medicines before surgery, ask when to start taking them again.  ? We do this for your safety. Many medicines can make you bleed too much during surgery. Some change how well surgery (anesthesia) drugs work.    Call your insurance company to let them know you're having surgery. (If you don't have insurance, call [phone].)    Call your clinic if there's any change in your health. This includes signs of a cold or flu (sore throat, runny nose, cough, rash, fever). It also includes a scrape or scratch near the surgery site.    If you have questions on the day of surgery, call your hospital or surgery center.  COVID testing  You may need to be tested for COVID-19 before having surgery. If so, your surgical team will give you instructions for scheduling this test, separate from your preoperative history and physical.  Eating and drinking guidelines  For your safety: Unless your surgeon tells you otherwise, follow the guidelines below.    Eat and drink as usual until 8 hours before surgery. After that, no food or milk.    Drink clear liquids until 2 hours before surgery. These are liquids you can see through, like water, Gatorade and Propel Water. You may also have black coffee and tea (no cream or milk).    Nothing by mouth within 2 hours of surgery. This includes gum, candy and breath mints.    If you drink alcohol: Stop drinking it the  night before surgery.    If your care team tells you to take medicine on the morning of surgery, it's okay to take it with a sip of water.  Preventing infection    Shower or bathe the night before and morning of your surgery. Follow the instructions your clinic gave you. (If no instructions, use regular soap.)    Don't shave or clip hair near your surgery site. We'll remove the hair if needed.    Don't smoke or vape the morning of surgery. You may chew nicotine gum up to 2 hours before surgery. A nicotine patch is okay.  ? Note: Some surgeries require you to completely quit smoking and nicotine. Check with your surgeon.    Your care team will make every effort to keep you safe from infection. We will:  ? Clean our hands often with soap and water (or an alcohol-based hand rub).  ? Clean the skin at your surgery site with a special soap that kills germs.  ? Give you a special gown to keep you warm. (Cold raises the risk of infection.)  ? Wear special hair covers, masks, gowns and gloves during surgery.  ? Give antibiotic medicine, if prescribed. Not all surgeries need antibiotics.  What to bring on the day of surgery    Photo ID and insurance card    Copy of your health care directive, if you have one    Glasses and hearing aides (bring cases)  ? You can't wear contacts during surgery    Inhaler and eye drops, if you use them (tell us about these when you arrive)    CPAP machine or breathing device, if you use them    A few personal items, if spending the night    If you have . . .  ? A pacemaker, ICD (cardiac defibrillator) or other implant: Bring the ID card.  ? An implanted stimulator: Bring the remote control.  ? A legal guardian: Bring a copy of the certified (court-stamped) guardianship papers.  Please remove any jewelry, including body piercings. Leave jewelry and other valuables at home.  If you're going home the day of surgery    You must have a responsible adult drive you home. They should stay with you  overnight as well.    If you don't have someone to stay with you, and you aren't safe to go home alone, we may keep you overnight. Insurance often won't pay for this.  After surgery  If it's hard to control your pain or you need more pain medicine, please call your surgeon's office.  Questions?   If you have any questions for your care team, list them here: _________________________________________________________________________________________________________________________________________________________________________ ____________________________________ ____________________________________ ____________________________________  For informational purposes only. Not to replace the advice of your health care provider. Copyright   2003, 2019 [facility]. All rights reserved. Clinically reviewed by Aminah Mcknight MD. SMART[employer] 341611 - REV 07/21.      Sinusitis [Abx Tx]    The sinuses are air-filled spaces within the bones of the face. They connect to the inside of the nose. Sinusitis is an inflammation of the tissue lining the sinus cavity. Sinus inflammation can occur during a cold or hay-fever (allergies to pollens and other particles in the air) and cause symptoms of sinus congestion and fullness. A sinus infection causes fever, headache and facial pain. There is usually green or yellow drainage from the nose or into the back of the throat (post-nasal drip). Antibiotics are prescribed to treat this condition.  Home Care:  Drink plenty of water, hot tea, and other liquids to stay well hydrated. This thins the mucus and promotes sinus drainage.  Apply heat to the painful areas of the face. Use a towel soaked in hot water. Or,  the shower and direct the hot spray onto your face. This is a good way to inhale warm water vapor and get heat on your face at the same time. (Cover your mouth and nose with your hands so you can still breathe as you do this.)  Use a vaporizer with products such as Vicks  VapoRub (contains menthol) at night. Suck on peppermint, menthol or eucalyptus hard candies during the day.  An expectorant containing guaifenesin (such as Robitussin), helps to thin the mucus and promote drainage from the sinuses.  Over-the-counter decongestants may be used unless a similar medicine was prescribed. Nasal sprays work the fastest. Use one that contains phenylephrine (Kyle-synephrine, Sinex and others) or oxymetazoline (Afrin). First blow the nose gently to remove mucus, then apply the drops. Do not use these medicines more often than directed on the label or for more than three days or symptoms may worsen. You may also use tablets containing pseudoephedrine (Sudafed). Many sinus remedies combine ingredients, which may increase side effects. Read the labels or ask the pharmacist for help. NOTE: Persons with high blood pressure should not use decongestants. They can raise blood pressure.  Antihistamines are useful if allergies are a cause of your sinusitis. The mildest one is chlorpheniramine (available without a prescription). The dose for adults is 8-12mg three times a day. [NOTE: Do not use chlorpheniramine if you have glaucoma or if you are a man with trouble urinating due to an enlarged prostate.] Claritin (loratidine) is an antihistamine that causes less drowsiness and is a good alternative for daytime use.  Do not use nasal rinses or irrigation during an acute sinus infection, unless advised by your doctor. Rinsing may spread the infection to other sinuses.  You may use acetaminophen (Tylenol) or ibuprofen (Motrin, Advil) to control pain, unless another pain medicine was prescribed. [ NOTE: If you have chronic liver or kidney disease or ever had a stomach ulcer, talk with your doctor before using these medicines.] (Aspirin should never be used in anyone under 18 years of age who is ill with a fever. It may cause severe liver damage.)  Finish the full course, even if you are feeling better after a  few days.  Follow Up  with your doctor or this facility in one week or as instructed by our staff if not improving.  Get Prompt Medical Attention  if any of the following occur:  Facial pain or headache becomes more severe  Stiff neck  Unusual drowsiness or confusion, or not acting like your normal self  Swelling of the forehead or eyelids  Vision problems including blurred or double vision  Fever of 100.4 F (38 C) or higher, or as directed by your healthcare provider  Seizure    [id number] [facility], [address] [place] [address], [place] [id number]. All rights reserved. This information is not intended as a substitute for professional medical care. Always follow your healthcare professional's instructions.

## 2022-02-21 ENCOUNTER — VIRTUAL VISIT (OUTPATIENT)
Dept: PSYCHIATRY | Facility: CLINIC | Age: 57
End: 2022-02-21
Attending: PSYCHOLOGIST
Payer: COMMERCIAL

## 2022-02-21 DIAGNOSIS — F33.2 MAJOR DEPRESSIVE DISORDER, RECURRENT SEVERE WITHOUT PSYCHOTIC FEATURES (H): Primary | ICD-10-CM

## 2022-02-21 DIAGNOSIS — F41.0 PANIC ATTACK: ICD-10-CM

## 2022-02-21 DIAGNOSIS — F41.1 GAD (GENERALIZED ANXIETY DISORDER): ICD-10-CM

## 2022-02-21 PROCEDURE — 90837 PSYTX W PT 60 MINUTES: CPT | Mod: U7 | Performed by: STUDENT IN AN ORGANIZED HEALTH CARE EDUCATION/TRAINING PROGRAM

## 2022-02-21 ASSESSMENT — PATIENT HEALTH QUESTIONNAIRE - PHQ9
SUM OF ALL RESPONSES TO PHQ QUESTIONS 1-9: 19
SUM OF ALL RESPONSES TO PHQ QUESTIONS 1-9: 19
10. IF YOU CHECKED OFF ANY PROBLEMS, HOW DIFFICULT HAVE THESE PROBLEMS MADE IT FOR YOU TO DO YOUR WORK, TAKE CARE OF THINGS AT HOME, OR GET ALONG WITH OTHER PEOPLE: EXTREMELY DIFFICULT

## 2022-02-21 NOTE — PROGRESS NOTES
"Jamison is a 56 year old who is being evaluated via a billable video visit.      VIDEO VISIT  Jailene Breen is a 56 year old patient who is being evaluated via a billable video visit.      The patient has been notified of following:   \"We have found that certain health care needs can be provided without the need for an in-person physical exam. This service lets us provide the care you need with a video conversation. If a prescription is necessary we can send it directly to your pharmacy. If lab work is needed we can place an order for that and you can then stop by our lab to have the test done at a later time. Insurers are generally covering virtual visits as they would in-office visits so billing should not be different than normal.  If for some reason you do get billed incorrectly, you should contact the billing office to correct it and that number is in the AVS .    Patient has given verbal consent for video visit?: Yes   How would you like to obtain your AVS?: MadeiraMadeiraS SmartPhrase [PsychAVS] has been placed in 'Patient Instructions': Yes      Video- Visit Details  Type of service:  video visit for psychotherapy  Time of service:    Date:  2/21/22    Video Start Time:  9:00 AM     Video End Time:  9:55AM    Reason for video visit:  Patient unable to travel due to Covid-19  Originating Site (patient location):  [facility]   Location- Patient's home  Distant Site (provider location):  [facility]  Mode of Communication:  Video Conference via [employer]Well  Consent:  Patient has given verbal consent for video visit?: Yes     Clinician Contact & Progress Note  Department of Psychiatry & Behavioral Sciences  [facility]    Patient: Jailene Breen (1965)     MRN: [id number]  Date of Treatment:  Feb 21, 2022  Duration of Treatment: Start Time: 9:00 AM; End Time: 9:55 AM  Provider: Franklin Breen MD     People present:   Provider: Franklin Cherry  Patient: Jailene YANES" "Jamshid    Encounter Diagnoses   Name Primary?     Major depressive disorder, recurrent severe without psychotic features (H) Yes     JEFF (generalized anxiety disorder)      Panic attack        Assessment (current symptoms):      PHQ 2/14/2022 2/21/2022 2/28/2022   PHQ-9 Total Score 17 19 19   Q9: Thoughts of better off dead/self-harm past 2 weeks More than half the days Nearly every day More than half the days   F/U: Thoughts of suicide or self-harm Yes Yes Yes   F/U: Self harm-plan No No No   F/U: Self-harm action No No No   F/U: Safety concerns No No No     JEFF-7 SCORE 1/24/2022 2/14/2022 2/14/2022   Total Score - - -   Total Score 9 (mild anxiety) - 20 (severe anxiety)   Total Score 9 20 20       Session Content:     - Now on anti-biotics for sinus infection so not back on RA meds  - \"the last couple weeks have been a nightmare, can't sleep, everything hurts\". Reports the last week has just been trying not to move, spending time watching TV, reading.   - Reports he left a message at rheum office  - Worried he is getting scammed for a medicare insurance issue. \"I hope I'm not that stupid, I'm usually better at reading people\"  - \" I can't do anything right now, I hope this isn't how my life is going to be\"  - I asked what he can do right now, he said \"read\". Reading about [employer]. Not reading anything related to psychology.   - Explored what he could do right now to feel productive. Was trying to help wife with dishes yesterday, floor was wet and he slipped and \"fell in slow motion\"  - Wants to get back into painting but thinks even that is too physical. Exploring techniques that are less physical.   - Wife and daughter help now without asking  - Conservative estimate of when he could start feeling better is 4 weeks based on how injections work. Explored what he can do for the next 4 weeks. \"not much I can do\". \"Don't like feeling so helpless or needy, don't like to depend on people\".     - \"I want " "to get out of this pity party, start feeling good about myself\". \"Since January has been the worst time mentally and physically\". This is the longest he has felt this disabled. Explored the relationship of this with his recent disability designation  - \"It's sinking in that I really need these meds to  Survive. 50 years ago people didn't have etanercept. Means I'm anthony to be alive now\".   - \"feel like I'm on my last legs of life now. Doctor had me fill out an advanced directive\".     - \"I haven't had much time to think about things like my mom\"  - \"Don't have as many people to reach out to, depend on, support you\"  \"kids are missing out on the imaginary grandma I wish she would have been. My in-laws are good grandparents so my kids do have that\"  - Admits both him and mom ignored each other in the grocery store      Answers for HPI/ROS submitted by the patient on 2/21/2022  If you checked off any problems, how difficult have these problems made it for you to do your work, take care of things at home, or get along with other people?: Extremely difficult  PHQ9 TOTAL SCORE: 19  JEFF 7 TOTAL SCORE: 20    Treatment Plan:   1. Depression- be more active (doing things around the house), more motivation, confidence in self, go back to school (degree in psychology)    2. Relationship with bio parents- have a conversation with mom without fighting, give her a hug. Have deeper conversations with bio dad, feel more like a son.     3. Feelings of worthlessness, guilt, self-blame- Less shame, more future oriented    Mental Status Exam:  Alertness: alert  and oriented  Appearance: well groomed  Behavior/Demeanor: cooperative, pleasant and calm, with good  eye contact   Speech: normal  Language: intact. Preferred language identified as English.  Psychomotor: normal or unremarkable  Mood: depressed and anxious  Affect: blunted and appropriate; was congruent to mood; was congruent to content  Thought Process/Associations: " "unremarkable  Thought Content:  Reports none;  Denies suicidal and violent ideation and delusions  -Suicidal ideation: denies SI, denies intent, and denies plan  -Homicidal Ideation: denies  Perception: Reports none;  Denies auditory hallucinations and visual hallucinations; intact    Insight: good  Judgment: good  Cognition: does  appear grossly intact    Billing for \"Interactive Complexity\"?    No    Franklin Breen MD      "

## 2022-02-22 ENCOUNTER — MYC MEDICAL ADVICE (OUTPATIENT)
Dept: PALLIATIVE MEDICINE | Facility: CLINIC | Age: 57
End: 2022-02-22
Payer: COMMERCIAL

## 2022-02-22 ASSESSMENT — PATIENT HEALTH QUESTIONNAIRE - PHQ9: SUM OF ALL RESPONSES TO PHQ QUESTIONS 1-9: 19

## 2022-02-23 ENCOUNTER — VIRTUAL VISIT (OUTPATIENT)
Dept: PSYCHIATRY | Facility: CLINIC | Age: 57
End: 2022-02-23
Payer: COMMERCIAL

## 2022-02-23 DIAGNOSIS — F41.9 ANXIETY: ICD-10-CM

## 2022-02-23 DIAGNOSIS — F32.A DEPRESSION, UNSPECIFIED DEPRESSION TYPE: ICD-10-CM

## 2022-02-23 PROCEDURE — 99214 OFFICE O/P EST MOD 30 MIN: CPT | Mod: 95 | Performed by: STUDENT IN AN ORGANIZED HEALTH CARE EDUCATION/TRAINING PROGRAM

## 2022-02-23 RX ORDER — LIOTHYRONINE SODIUM 25 UG/1
25 TABLET ORAL DAILY
Qty: 30 TABLET | Refills: 2 | Status: SHIPPED | OUTPATIENT
Start: 2022-02-23 | End: 2022-03-30

## 2022-02-23 RX ORDER — FLUOXETINE 40 MG/1
40 CAPSULE ORAL DAILY
Qty: 30 CAPSULE | Refills: 2 | Status: SHIPPED | OUTPATIENT
Start: 2022-02-23 | End: 2022-03-30

## 2022-02-23 RX ORDER — QUETIAPINE FUMARATE 50 MG/1
50 TABLET, FILM COATED ORAL AT BEDTIME
Qty: 45 TABLET | Refills: 2 | Status: SHIPPED | OUTPATIENT
Start: 2022-02-23 | End: 2022-03-30

## 2022-02-23 NOTE — PROGRESS NOTES
Jailene Breen is a 56 year old who has consented to receive services via billable video visit.      Pt will join video visit via: [employer]  If there are problems joining the visit, send backup video invite via: Text to preferred phone: [phone]      Rosetta COWAN    Originating Location (patient location): Patient's home  Distant Location (provider location): [facility]    Will anyone else be joining the video visit? No    How would you prefer to obtain AVS?: Shahram

## 2022-02-23 NOTE — PROGRESS NOTES
Video- Visit Details  Type of service:  video visit for medication management  Time of service:    Date:  02/23/2022    Video Start Time: 1:01 PM               Video End Time: 1:29 PM    Reason for video visit:  Patient unable to travel due to Covid-19  Originating Site (patient location):  [facility]   Location- Patient's home  Distant Site (provider location):  Remote location  Mode of Communication:  Video Conference via AmWell  Consent:  Patient has given verbal consent for video visit?: Yes        [facility]  Psychiatry Clinic  PSYCHIATRY PROGRESS NOTE     CARE TEAM:    PCP- Aiden Resendez   Rheumatology- Raghu Parada MD  [facility]- Susana GRANT; Santa Farrell PsyD  Therapist - MD Sravanthi Weststephenie Breen is a 56 year old patient who prefers the name Les and uses pronouns he, him, his.   PERTINENT BACKGROUND                                 [most recent eval 09/21/20]   Psych pertinent item history includes suicidal ideation, SIB [cutting], mutiple psychotropic trials , severe med reaction, psych hosp (<3), SUBSTANCE USE: alcohol, substance use treatment  and Major Medical Problems (Rheumatoid Arthritis and Ankylosing Spondylitis)    DIAGNOSES                       Major Depressive Disorder, recurrent, severe, without psychotic features  Generalized Anxiety Disorder, with panic    Chronic Pain  Ankylosing Spondylitis    ASSESSMENT                       Today, reports interim stability of depression and ongoing remission of suicidal ideation. Is struggling with pain due to not being able to take RA meds due to COVID and subsequent sinus infection; he is working with pain clinic and rheumatology about coordinating pain management and restarting his RA meds. No panic attacks, previously described as happening overnight, elicited since last appointment. Does have some preoccupation with switching insurance but is  "appropriately planning and organizing what he needs to make the switch. Is looking forward to the future, once pain improved and completed hiatal hernia surgery. At this point, given significant medical issues and no acute safety concerns does not make sense to change psychotropic medications. Will follow-up in 4-6 weeks to re-evaluate how he is doing at that point.    [place] was checked today:  Indicates no medication misuse .    PLAN                                                                                   1) Medications:  -fluoxetine 40mg daily   -quetiapine 50mg at bedtime  -quetiapine 25-50mg as needed for anxiety and panic  -liothyronine 25mcg daily    2) Therapy- psychodynamic    3) Next Due   Labs- AP monitoring labs due 9/2022  EKG- as needed  Rating scales- serial PHQ9s    4) Referrals  none today    5) RTC: 5 weeks    6) Crisis Numbers:   Provided routinely in AVS     After hours:  [phone]    INTERIM HISTORY        The patient reports good treatment adherence.  History was provided by the patient who was a good historian.     Since the last visit:   -Today has been \"struggling\" because daughter's dog is having seizures. Is \"scary\" because he loves the dog.   -Hasn't been able to take RA meds since January due to COVID and then a subsequent sinus infection. He notes that has made it very painful for him to get up in the morning and moving. Antibiotic course finishes up in 2 days.   -Will be transitioning to medicare, which makes him \"feel like your grandparents.\" Hoping that \"it won't screw anything up.\" Discussed medications that he is worried about copays for, specifically RA and pain meds. Encouraged communication with other providers.  -Also anticipates upcoming surgery on March 4.   -Still having conflicts with relationship with mom, haven't spoken in a year. He did see her in the grocery store but didn't say anything, \"she just looked like a mad old woman walking by.\" Also worried, if she " "dies and he doesn't have the opportunity to tell her that he loves her.   -Noticing some chest/stomach discomfort, sweating.  -Does believe he's been \"really down\" and had a hard time coping with pain. Always in \"pain and in my pitty pot.\"  -Having difficulty sleeping due to pain at night, related to RA. Oxycodone was prescribed by pain clinic, which has been helpful.   -Memory, concentration, and organization has \"been fine.\" Has been playing games to distract himself from the pain and keep busy.   -No panic attacks, previously described as happening overnight, elicited since last appointment.  -Denies suicidal ideation or thoughts of self harm. Some thoughts that \"I wish I wouldn't wake up\" due to the pain, because \"there is stuff that I want to do.\"    RECENT PSYCH ROS:   Depression: depressed mood, excessive guilt and indecisiveness   Elevated:  none  Psychosis:  none  Anxiety:  as above  Trauma Related:  none  Dysregulation:  none  Eating Disorder: no  Other: no    Adverse Effects:  denies  Pertinent Negative Symptoms: No suicidal ideation, violent ideation, aggression, psychosis, hallucinations, delusions, otto and hypomania    Recent Substance Use:     Alcohol- none since , used to attend AA meetings prior to COVID.  Tobacco- denies  Caffeine- yes  Cannabis- medical cannabis  Opioids- as prescribed           Narcan Kit- prescribed   Other illicit drugs- none    FAMILY and SOCIAL HISTORY                                      [per patient report]            Family Hx:  Mom - depression (since  when sister )    Social Hx:  Financial/ Work- currently attending college at [employer], is hoping to start school at [employer] for a degree in psychology after being unable to work due to diagnosis of rheumatoid arthritis and ankylosing spondylitis  Partner/ -  in .  Children- 25 and 27 year old kids      Living situation- [place], house with wife and 2 kids      Social/ Spiritual Support- " "Talks to  every two weeks for lunch, but \"doesn't really have friends\". Family is supportive.      PSYCH and SUBSTANCE USE Critical Summary Points since July 2020 September - started escitalopram  November - increased escitalopram 20mg daily  February - stopped hydroxyzine; started trazodone 50mg at bedtime and 25mg daily as needed for anxiety  March/April - Patient hospitalized started on Abilify, titrated to 5mg daily; escitalopram switched to fluoxetine 40mg daily; started prazosin 1mg at bedtime. Pain management switched oxycodone to Suboxone and started medical marijuana.  May - patient still taking hydroxyzine, discontinued today.  June - patient hospitalized (6/15/21) discontinued Abilify and trazodone, started on quetiapine & titrated to 100mg at bedtime and 25mg as needed for sleep.   August - increase quetiapine 150mg   November - discontinued quetiapine 150mg (patient ran out of medications and did not take for several weeks).  December - patient restarted quetiapine 50mg at bedtime     MEDICAL HISTORY  and ALLERGY     ALLERGIES: Mirtazapine, Hydrocodone, Ms contin [morphine], and Remeron soltab     Patient Active Problem List   Diagnosis     CARDIOVASCULAR SCREENING; LDL GOAL LESS THAN 160     Obesity, Class I, BMI 30-34.9     Tear meniscus knee, right, initial encounter     Rheumatoid arthritis involving multiple sites, unspecified rheumatoid factor presence     Chronic pain     Right-sided thoracic back pain, unspecified chronicity     Generalized anxiety disorder     Panic attack     Ankylosing spondylitis (H)     Tobacco use disorder     Moderate major depression (H)     Immunocompromised (H)     Essential hypertension with goal blood pressure less than 140/90     Suicidal ideation     Insomnia due to other mental disorder     Major depressive disorder, recurrent episode, severe with mixed features (H)         MEDICAL REVIEW OF SYSTEMS     Contraception- none  A comprehensive review of " systems was performed and is negative other than noted in the HPI.    MEDICATIONS          Current Outpatient Medications   Medication Sig Dispense Refill     acetaminophen (TYLENOL) 500 MG tablet Take 1,000 mg by mouth every 6 hours as needed for mild pain (headache)       buprenorphine HCl-naloxone HCl (SUBOXONE) 8-2 MG per film Place 0.5 Film under the tongue 2 times daily Max 1 film per day. Fill 2/4/22 and start 2/5/22 to last 30 days for chronic pain 30 each 0     doxycycline monohydrate (ADOXA) 100 MG tablet Take 1 tablet (100 mg) by mouth every 12 hours for 7 days 14 tablet 0     etanercept (ENBREL SURECLICK) 50 MG/ML autoinjector Inject 50 mg Subcutaneous once a week 1 mL 3     FLUoxetine (PROZAC) 40 MG capsule Take 1 capsule (40 mg) by mouth daily 30 capsule 2     folic acid (FOLVITE) 1 MG tablet Take 5 tablets (5 mg) by mouth daily 150 tablet 3     liothyronine (CYTOMEL) 25 MCG tablet Take 1 tablet (25 mcg) by mouth daily 30 tablet 2     medical cannabis (Patient's own supply) See Admin Instructions (The purpose of this order is to document that the patient reports taking medical cannabis.  This is not a prescription, and is not used to certify that the patient has a qualifying medical condition.)     Pills PRN   Lozenges PRN       methotrexate sodium 2.5 MG TABS TAKE 4 TABLETS BY MOUTH ONCE A WEEK 48 tablet 0     nicotine polacrilex (NICORETTE) 4 MG gum PLACE AND PARK ONE PIECE INSIDE CHEEK AS NEEDED FOR SMOKING CESSATION 200 each 1     oxyCODONE (ROXICODONE) 5 MG tablet Take 1 tablet (5 mg) by mouth every 6 hours as needed for severe pain Max of 4 tabs per day. Fill/begin 2/15/2022. This is for an acute flare of RA pain while off his chronic RA meds and has been sick. 40 tablet 0     predniSONE (DELTASONE) 5 MG tablet Take 1 tablet (5 mg) by mouth daily 100 tablet 1     QUEtiapine (SEROQUEL) 50 MG tablet Take 1 tablet (50 mg) by mouth At Bedtime May take additional 25-50mg (1/2 - 1 tablet) daily prn for  "anxiety/panic attacks 45 tablet 2     VITALS                     There were no vitals taken for this visit.   MENTAL STATUS EXAM         Alertness: alert  and oriented  Appearance: adequately groomed  Behavior/Demeanor: cooperative, with good  eye contact   Speech: regular rate and rhythm  Language: intact  Psychomotor: normal or unremarkable  Mood: \"really down\"   Affect: appropriate; congruent to: mood- yes, content- yes  Thought Process/Associations: unremarkable  Thought Content:  Reports none;  Denies suicidal ideation, violent ideation and delusions   Perception:  Reports none;  Denies auditory hallucinations and visual hallucinations  Insight: good  Judgment: good  Cognition: (6) oriented: time, person, and place  attention span: intact  concentration: intact  recent memory: intact  remote memory: intact  fund of knowledge: appropriate  Gait and Station: N/A (telehealth)    LABS and DATA     PHQ9 TODAY = 15  PHQ 1/31/2022 2/14/2022 2/21/2022   PHQ-9 Total Score 17 17 19   Q9: Thoughts of better off dead/self-harm past 2 weeks More than half the days More than half the days Nearly every day   F/U: Thoughts of suicide or self-harm Yes Yes Yes   F/U: Self harm-plan No No No   F/U: Self-harm action No No No   F/U: Safety concerns No No No       Recent Labs   Lab Test 10/11/21  1123 06/16/21  0741   CR 0.91 1.09   GFRESTIMATED >90 77     Recent Labs   Lab Test 10/11/21  1123 06/16/21  0741   AST 37 23   ALT 47 33   ALKPHOS 102 88     Recent Labs   Lab Test 10/11/21  1123 06/16/21  0741   GLC 97 92   A1C 5.2  --      Recent Labs   Lab Test 10/11/21  1123   CHOL 209*   TRIG 225*   *   HDL 30*     Recent Labs   Lab Test 10/11/21  1123 06/16/21  0741   AST 37 23   ALT 47 33   ALKPHOS 102 88     Recent Labs   Lab Test 10/11/21  1123 06/16/21  0741 03/21/21  0808 03/17/21  0748   WBC 7.0 6.1 6.8 8.3   ANEU  --   --  3.7 4.4   HGB 16.2 14.6 15.6 15.0    166 246 211     PSYCHOTROPIC DRUG INTERACTIONS "   Increased risk of QTc prolongation: fluoxetine, quetiapine, Suboxone  Increased risk of serotonin syndrome: fluoxetine, Suboxone  Increased risk of CNS/respiratory depression: Suboxone, quetiapine    MANAGEMENT:  Monitoring for adverse effects and patient is aware of risks    RISK STATEMENT for SAFETY     Today, Jamison Breen does not report any suicidal ideation and does not appear to be an imminent risk to self or others.    SUICIDE RISK ASSESSMENT-  Risk factors for self-harm: previous suicide attempt, recent psych inpt , financial/legal stress, significant pain and takes opiates.  Mitigating factors: h/o seeking help , commitment to family and participation in DBT or day program.  The patient does not appear to be at imminent risk for self-harm, hospitalization is recommended which the pt does not agree to. No hospitalization will be arranged. Safety plan reviewed today including contacting family, friends (), day treatment team, clinic, or crisis line.    TREATMENT RISK STATEMENT:  The risks, benefits, alternatives and potential adverse effects have been discussed and are understood by the pt. The pt understands the risks of using street drugs or alcohol. There are no medical contraindications, the pt agrees to treatment with the ability to do so. The pt knows to call the clinic for any problems or to access emergency care if needed.  Medical and substance use concerns are documented above.  Psychotropic drug interaction check was done, including changes made today.    PROVIDER: Slim Sage MD    Patient was not staffed.  Supervisor is Dr. Downing who will sign the note.

## 2022-02-23 NOTE — NURSING NOTE
Pt stated he is in a lot of pain. Has not been able to take his meds as he has gotten over COVID and now a sinus infection.    Pt stated that no changes since last med review 2/16/2022.    Pt stated family dog is having seizures and it has been a hard day.    Rosetta Dior VF

## 2022-02-23 NOTE — TELEPHONE ENCOUNTER
Devante Hernandez I completed your re-certification for [place] Medical [place]. You should receive an e-mail for registration in the very near future.      Thanks.  Susana GRANT RN CNP, XUAN  [facility]    I have sent the above SmartyContent message to the patient.     Susana GRANT RN CNP, XUAN  [facility]

## 2022-02-24 ENCOUNTER — MYC MEDICAL ADVICE (OUTPATIENT)
Dept: PALLIATIVE MEDICINE | Facility: CLINIC | Age: 57
End: 2022-02-24
Payer: COMMERCIAL

## 2022-02-24 DIAGNOSIS — M45.7 ANKYLOSING SPONDYLITIS OF LUMBOSACRAL REGION (H): ICD-10-CM

## 2022-02-24 DIAGNOSIS — M54.59 LUMBAR FACET JOINT PAIN: ICD-10-CM

## 2022-02-24 DIAGNOSIS — M51.369 DDD (DEGENERATIVE DISC DISEASE), LUMBAR: ICD-10-CM

## 2022-02-24 DIAGNOSIS — G89.29 CHRONIC BILATERAL LOW BACK PAIN WITH RIGHT-SIDED SCIATICA: ICD-10-CM

## 2022-02-24 DIAGNOSIS — M25.551 HIP PAIN, RIGHT: ICD-10-CM

## 2022-02-24 DIAGNOSIS — M05.79 RHEUMATOID ARTHRITIS INVOLVING MULTIPLE SITES WITH POSITIVE RHEUMATOID FACTOR (H): ICD-10-CM

## 2022-02-24 DIAGNOSIS — M54.41 CHRONIC BILATERAL LOW BACK PAIN WITH RIGHT-SIDED SCIATICA: ICD-10-CM

## 2022-02-24 RX ORDER — OXYCODONE HYDROCHLORIDE 5 MG/1
5 TABLET ORAL EVERY 6 HOURS PRN
Qty: 40 TABLET | Refills: 0 | Status: SHIPPED | OUTPATIENT
Start: 2022-02-24 | End: 2022-03-25

## 2022-02-24 NOTE — TELEPHONE ENCOUNTER
Received call from patient requesting refill(s) of oxyCODONE (ROXICODONE) 5 MG tablet    Last dispensed from pharmacy on 02/15/22    Patient's last office/virtual visit by prescribing provider on 02/04/22  Next office/virtual appointment scheduled for None    Last urine drug screen date 01/21/20-pain clinic screen. Has had 3 other drug screens since: 03/16/21, 03/21/21, and 06/15/21  Current opioid agreement on file (completed within the last year) No Date of opioid agreement: 01/21/21    E-prescribe to pharmacy-[id number] - [place] - [address]     Will route to nursing [place] for review and preparation of prescription(s).

## 2022-02-24 NOTE — TELEPHONE ENCOUNTER
Medication refill information reviewed.     Last due:  Fill/begin 2/15/2022. This is for an acute flare of RA pain while off his chronic RA meds and has been sick. 4 tabs/day, #40 tabs.    Per the 2/21/22 mychart encounter h is rheumatologist has him off the enbrel still until he is off antibiotics and his sinus infection has resolved.    Due date:  2/25/22      Prescriptions prepped for review.     Cha RN-BSN  [facility]

## 2022-02-24 NOTE — TELEPHONE ENCOUNTER
Routed to the nursing pool and to the MA pool to process refill(s).    Cha Molina, RN-BSN  [facility]

## 2022-02-25 ENCOUNTER — MYC MEDICAL ADVICE (OUTPATIENT)
Dept: RHEUMATOLOGY | Facility: CLINIC | Age: 57
End: 2022-02-25
Payer: COMMERCIAL

## 2022-02-25 DIAGNOSIS — M06.9 RHEUMATOID ARTHRITIS (H): ICD-10-CM

## 2022-02-25 NOTE — TELEPHONE ENCOUNTER
[email or web address] message sent with Rx approval from provider.  Fredi  Pain Clinic Management Team

## 2022-02-25 NOTE — TELEPHONE ENCOUNTER
Signed Prescriptions:                        Disp   Refills    oxyCODONE (ROXICODONE) 5 MG tablet         40 tab*0        Sig: Take 1 tablet (5 mg) by mouth every 6 hours as needed           for severe pain Max of 4 tabs per day. Fill           2/25/2022. Start  2/25/2022. This is for an acute           flare of RA pain while off his chronic RA meds           and has been sick.  Authorizing Provider: SUSANA PETTY    Reviewed [place]  February 24, 2022- no concerning fills.    Susana GRANT, RN CNP, FNP  [facility]  [facility]

## 2022-02-28 ENCOUNTER — VIRTUAL VISIT (OUTPATIENT)
Dept: PSYCHIATRY | Facility: CLINIC | Age: 57
End: 2022-02-28
Attending: PSYCHOLOGIST
Payer: COMMERCIAL

## 2022-02-28 ENCOUNTER — TELEPHONE (OUTPATIENT)
Dept: BEHAVIORAL HEALTH | Facility: CLINIC | Age: 57
End: 2022-02-28
Payer: COMMERCIAL

## 2022-02-28 DIAGNOSIS — F41.1 GAD (GENERALIZED ANXIETY DISORDER): ICD-10-CM

## 2022-02-28 DIAGNOSIS — F33.2 MAJOR DEPRESSIVE DISORDER, RECURRENT SEVERE WITHOUT PSYCHOTIC FEATURES (H): Primary | ICD-10-CM

## 2022-02-28 PROCEDURE — 90837 PSYTX W PT 60 MINUTES: CPT | Mod: U7 | Performed by: STUDENT IN AN ORGANIZED HEALTH CARE EDUCATION/TRAINING PROGRAM

## 2022-02-28 NOTE — PROGRESS NOTES
"Jamison is a 56 year old who is being evaluated via a billable video visit.      VIDEO VISIT  Jailene Breen is a 56 year old patient who is being evaluated via a billable video visit.      The patient has been notified of following:   \"We have found that certain health care needs can be provided without the need for an in-person physical exam. This service lets us provide the care you need with a video conversation. If a prescription is necessary we can send it directly to your pharmacy. If lab work is needed we can place an order for that and you can then stop by our lab to have the test done at a later time. Insurers are generally covering virtual visits as they would in-office visits so billing should not be different than normal.  If for some reason you do get billed incorrectly, you should contact the billing office to correct it and that number is in the AVS .    Patient has given verbal consent for video visit?: Yes   How would you like to obtain your AVS?: [employer] SmartPhrase [PsychAVS] has been placed in 'Patient Instructions': Yes      Video- Visit Details  Type of service:  video visit for psychotherapy  Time of service:    Date:  2/28/22    Video Start Time:  9:00 AM     Video End Time:  9:55AM    Reason for video visit:  Patient unable to travel due to Covid-19  Originating Site (patient location):  [facility]   Location- Patient's home  Distant Site (provider location):  [facility]  Mode of Communication:  Video Conference via AmWell  Consent:  Patient has given verbal consent for video visit?: Yes     Clinician Contact & Progress Note  Department of Psychiatry & Behavioral Sciences  [facility]    Patient: Jialene Breen (1965)     MRN: [id number]  Date of Treatment:  Feb 28, 2022  Duration of Treatment: Start Time: 9:00 AM; End Time: 9:55 AM  Provider: Franklin Breen MD     People present:   Provider: Franklin Cherry  Patient: Jailene YANES" "Jamshid    Encounter Diagnoses   Name Primary?     Major depressive disorder, recurrent severe without psychotic features (H) Yes     JEFF (generalized anxiety disorder)        Assessment (current symptoms):      PHQ 2/14/2022 2/21/2022 2/28/2022   PHQ-9 Total Score 17 19 19   Q9: Thoughts of better off dead/self-harm past 2 weeks More than half the days Nearly every day More than half the days   F/U: Thoughts of suicide or self-harm Yes Yes Yes   F/U: Self harm-plan No No No   F/U: Self-harm action No No No   F/U: Safety concerns No No No     JEFF-7 SCORE 1/24/2022 2/14/2022 2/14/2022   Total Score - - -   Total Score 9 (mild anxiety) - 20 (severe anxiety)   Total Score 9 20 20       Session Content:     - \"tired, not getting much sleep and have a lot on my mind\"  - Took RA injection at 1AM on Friday, symptoms mildly improved  - Has surgery this Friday for hernia    - Also called, texted, emailed bio-dad and he has not responded. Reports he is frustrated but also worried  - Was feeling lonely, \"just wanted to talk\"  - Admits his first urge is to \"pay him back, do something to hurt or anger him\". \"He does this a lot, he only calls when he's driving somewhere and we only have that long to talk. I don't feel special. I wonder if he is keeping our conversations secret from his other family. Don't think his wife likes me, I'm his first son, I look like him\".   - \"I wish he would do something special with me. He goes on vacations with his other kids, camping, trips\".   - \"it would be nice knowing I have someone who cares about me\"  - \"I know it's never going to be what I want in my head\"  - Reports the knowledge he will not get what he wants from either parents is not new, but this still causes a lot of hurt, sadness, anger  - Says a few years ago he went down to [place] to surprise his dad, \"I never got invited to stay at the house, only saw him 4 days for a little bit of time, it was after work, only would get dinner\". This " "surprise was mom's idea. \"He didn't make the time for me, he could have, he owns his own company\". \"Went to [place] by myself, didn't have anyone to share it with. The whole trip was a waste of a lot of money\".     - Also discussed how his half sister  due to an accident at [facility], he \"was a jerk to her the last time we talked\". Relates how he worries about this happening with his mom.     - His wife apparently has two loving parents, who Les likes. Her dad gave him a hug yesterday, \" I liked that, it melted my heart\".   - Discussed making sure to let the supportive people in his life that he knows they care    - Discussed what would happen if parent's , would he be stuck. Feels he might be unable to move on from what he is trying to get from his parents right now.     Answers for HPI/ROS submitted by the patient on 2022  If you checked off any problems, how difficult have these problems made it for you to do your work, take care of things at home, or get along with other people?: Extremely difficult  PHQ9 TOTAL SCORE: 19    Treatment Plan:   1. Depression- be more active (doing things around the house), more motivation, confidence in self, go back to school (degree in psychology)    2. Relationship with bio parents- have a conversation with mom without fighting, give her a hug. Have deeper conversations with bio dad, feel more like a son.     3. Feelings of worthlessness, guilt, self-blame- Less shame, more future oriented    Mental Status Exam:  Alertness: alert  and oriented  Appearance: well groomed  Behavior/Demeanor: cooperative, pleasant and calm, with good  eye contact   Speech: normal  Language: intact. Preferred language identified as English.  Psychomotor: normal or unremarkable  Mood: depressed and anxious  Affect: blunted and appropriate; was congruent to mood; was congruent to content  Thought Process/Associations: unremarkable  Thought Content:  Reports none;  Denies " "suicidal and violent ideation and delusions  -Suicidal ideation: denies SI, denies intent, and denies plan  -Homicidal Ideation: denies  Perception: Reports none;  Denies auditory hallucinations and visual hallucinations; intact    Insight: good  Judgment: good  Cognition: does  appear grossly intact    Billing for \"Interactive Complexity\"?    No    Franklin Breen MD          "

## 2022-02-28 NOTE — TELEPHONE ENCOUNTER
[facility] PHQ-9 Follow-up  Behavioral Health Clinician Triage Service    [facility] PHQ-9 Responses:  [facility] Emergency Department Follow-up to PHQ 2/14/2022 2/21/2022 2/28/2022   PHQ-9 9. Suicide Ideation past 2 weeks More than half the days Nearly every day More than half the days   Thoughts of suicide or self harm in past 2 weeks Yes Yes Yes   Thoughts of suicide or self harm in past 2 weeks Yes Yes Yes   PHQ-9 Self harm plan? No No No   PHQ-9 Self harm action? No No No   PHQ-9 Safety concerns? No No No   PHQ-9 Self harm plan? No No No   PHQ-9 Self harm action? No No No   PHQ-9 Safety concerns? No No No        1st Outreach Date: February 28, 2022 Time: 1:45 pm  Outcome: Completed phone conversation / triage service.  See assessment and disposition below.  Elana JOSEPH, [facility]    Disposition:    - Recommendations / Safety Plan:  Patient missed psychotherapy appointment on 2/21 with a positive response to #9 on PHQ9. Patient just arriving on BPA report for reach out on 2/28. Noted that patient attended psychiatry appointment on 2/23. Patient appeared to have not arrived for today's therapy appointment. Messaged provider from today, without response. Called and spoke with patient who shared that he had attended his appointment today with his therapist.  No additional assessment completed due to him attending his appointment with his established therapist.     Elana Daugherty, [facility]  Behavioral Health Clinician

## 2022-03-01 ENCOUNTER — MYC MEDICAL ADVICE (OUTPATIENT)
Dept: PALLIATIVE MEDICINE | Facility: CLINIC | Age: 57
End: 2022-03-01
Payer: COMMERCIAL

## 2022-03-01 DIAGNOSIS — F11.20 UNCOMPLICATED OPIOID DEPENDENCE (H): ICD-10-CM

## 2022-03-01 DIAGNOSIS — M45.7 ANKYLOSING SPONDYLITIS OF LUMBOSACRAL REGION (H): ICD-10-CM

## 2022-03-01 DIAGNOSIS — G89.29 CHRONIC BILATERAL LOW BACK PAIN WITH RIGHT-SIDED SCIATICA: ICD-10-CM

## 2022-03-01 DIAGNOSIS — M54.59 LUMBAR FACET JOINT PAIN: ICD-10-CM

## 2022-03-01 DIAGNOSIS — M54.41 CHRONIC BILATERAL LOW BACK PAIN WITH RIGHT-SIDED SCIATICA: ICD-10-CM

## 2022-03-01 DIAGNOSIS — M05.79 RHEUMATOID ARTHRITIS INVOLVING MULTIPLE SITES WITH POSITIVE RHEUMATOID FACTOR (H): ICD-10-CM

## 2022-03-01 DIAGNOSIS — M25.551 HIP PAIN, RIGHT: ICD-10-CM

## 2022-03-01 DIAGNOSIS — M51.369 DDD (DEGENERATIVE DISC DISEASE), LUMBAR: ICD-10-CM

## 2022-03-01 ASSESSMENT — PATIENT HEALTH QUESTIONNAIRE - PHQ9: SUM OF ALL RESPONSES TO PHQ QUESTIONS 1-9: 19

## 2022-03-01 NOTE — TELEPHONE ENCOUNTER
etanercept (ENBREL SURECLICK) 50 MG/ML autoinjector  Last Written Prescription Date:  5/19/2021  Last Fill Quantity: 1 ml ,   # refills: 3  Last Office Visit : 7/26/21  Future Office visit:  3 months    Per chart review:    2/21/22Dr. Parada recommends holding off on your Enbrel until you are done with your antibiotics, and your sinus infection has resolved  Per Patient 2/25/22> sx resolved    Scheduling has been notified to contact the pt for appointment.

## 2022-03-01 NOTE — TELEPHONE ENCOUNTER
Routed to the nursing pool and to the MA pool to process refill(s).    Cha Molina, RN-BSN  [facility]

## 2022-03-01 NOTE — TELEPHONE ENCOUNTER
Received call from patient requesting refill(s) of buprenorphine HCl-naloxone HCl (SUBOXONE) 8-2 MG per film    Last dispensed from pharmacy on 02/04/22    Patient's last office/virtual visit by prescribing provider on 02/04/22  Next office/virtual appointment scheduled for : none    Last urine drug screen date 06/15/21- not usual pain clinic screen  Current opioid agreement on file (completed within the last year) No Date of opioid agreement: 02/01/20    E-prescribe to [id number] - [place] - [address]     Will route to nursing [place] for review and preparation of prescription(s).

## 2022-03-01 NOTE — TELEPHONE ENCOUNTER
Medication refill information reviewed.     Last due:  Fill 2/4/22 and start 2/5/22 to last 30 days  Due date:  3/7/22      Prescriptions prepped for review.     Cha RN-BSN  [facility]

## 2022-03-02 RX ORDER — BUPRENORPHINE AND NALOXONE 8; 2 MG/1; MG/1
0.5 FILM, SOLUBLE BUCCAL; SUBLINGUAL 2 TIMES DAILY
Qty: 30 EACH | Refills: 0 | Status: SHIPPED | OUTPATIENT
Start: 2022-03-02 | End: 2022-04-08

## 2022-03-02 NOTE — TELEPHONE ENCOUNTER
Spoke to patient, notified that prescription was sent to preferred pharmacy.    Able to fill 03/05/22 and start 03/07/22    Reminded patient to call the PAIN scheduling line number to set up a nurse visit for UDS and CSA.      Joy Kessler MA  [facility]

## 2022-03-02 NOTE — TELEPHONE ENCOUNTER
Signed Prescriptions:                        Disp   Refills    buprenorphine HCl-naloxone HCl (SUBOXONE) *30 each0        Sig: Place 0.5 Film under the tongue 2 times daily Max 1           film per day. Fill 3/5/22 and start 3/7/22 to           last 30 days for chronic pain  Authorizing Provider: SUSANA PETTY    Reviewed [place]  March 2, 2022- no concerning fills.  Please also forward to nursing to set up urine drug screen and re-sign CSA. Thanks.   Susana Petty APRN, RN CNP, FNP  [facility]

## 2022-03-03 NOTE — TELEPHONE ENCOUNTER
[facility] [facility] [place] [facility]    CARDIOLOGY OFFICE NOTES  3/3/2022    Reason for office visit:  Consultation:  Atypical chest pain  Requested by: ED Service    Jasvir Vick is a 40 year old male with the following medical history:    · Hypertension  · DM type 2    The rest of the medical and surgical history listed in Epic were reviewed.    HISTORY OF PRESENT ILLNESS:    Presented to the ED for chest pain while lying down, described as sharp located in the mid chest area which lasted for 2 hours.  This was associated with shortness of breath, 2-500 mg tablets of Tylenol & went to the ED. workup was unremarkable & the he was not prescribed any meds subsequently.  He states that he had 1 episode of chest pain & spring which was thought to be due to muscle strain.  States that he also has left calf pain when he has chest pain.    He has easy fatigability but no shortness of breath.  He has no orthopnea or paroxysmal nocturnal dyspnea.  He is not physically active & he does not exercise.  He also is not working.  He has no palpitations or lightheadedness.  He has no leg swelling.  He    PAST MEDICAL & SOCIAL HISTORY:  As above.    Social History:  Smokes 10 cigarettes a day.  He smoked heavily before.  Smokes since age 20.  He drinks alcoholic beverages only occasionally.    He is on disability.    Family History:  Father has DM.  Mother  of MI in her sleep.  Had DM.    REVIEW OF SYSTEMS:  As above and as in the patient's response to the review of systems questionnaire.    CURRENT MEDICATIONS (reviewed):  Current Outpatient Medications   Medication Sig Dispense Refill   • lisinopril (ZESTRIL) 10 MG tablet Take 10 mg by mouth daily.     • metFORMIN (GLUCOPHAGE-XR) 500 MG 24 hr tablet Take 1,000 mg by mouth.     • metFORMIN (GLUCOPHAGE) 500 MG tablet Take 1 tablet by mouth 2 times daily (with meals). 60 tablet 0   • naproxen (NAPROSYN) 500 MG tablet Take 1 tablet by mouth 2 times daily (with meals). 20 tablet 0  Was addressed.    Katarina ROSSI      • ibuprofen (MOTRIN) 800 MG tablet Take 1 tablet by mouth every 8 hours as needed for Pain. 30 tablet 0   • sulfamethoxazole-trimethoprim (BACTRIM DS) 800-160 MG per tablet Take 1 tablet by mouth 2 times daily. 20 tablet 0   • FAM Unable to Find Medication once. Something for his back, pain pill     • fluticasone (FLONASE) 50 MCG/ACT nasal spray Spray 1 spray in each nostril daily as needed (runny nose/nose congestion). 1 Bottle 0   • Psyllium (METAMUCIL) 28.3 % POWD Take  by mouth.       No current facility-administered medications for this visit.     ALLERGIES:  No Known Allergies    PHYSICAL EXAMINATION:    Visit Vitals  /76 (BP Location: LUE - Left upper extremity, Patient Position: Sitting, Cuff Size: Large Adult)   Pulse 84   Ht 5' 7\" (1.702 m)   Wt 82.6 kg (182 lb)   BMI 28.51 kg/m²     General: Not in distress. Well developed.  Neck:  No jugular venous distention. No carotid bruit.  Chest/Lungs:  Clear breath sounds. No crackles. No wheezes. No rhonchi.  Heart:  Regular rhythm. Normal rate. No S3 or S4. + systolic click at the apexNo significant murmur appreciated.  Abdomen:  Soft and nontender. No palpable organomegaly.   Extremities: No pedal edema.     DIAGNOSTIC DATA (reviewed):    Troponin I, High Sensitivity (ng/L)   Date Value   03/02/2022 4   03/02/2022 4   11/29/2021 <4     Lab Results   Component Value Date    NTPROB 6 03/02/2022    NTPROB 24 11/29/2021     Lab Results   Component Value Date    MG 2.1 11/29/2021     Lab Results   Component Value Date    AST 22 03/02/2022    AST 32 11/29/2021    GPT 23 03/02/2022    GPT 48 11/29/2021    ALKPT 76 03/02/2022    ALKPT 94 11/29/2021    BILIRUBIN 0.3 03/02/2022    BILIRUBIN 0.5 11/29/2021     Lab Results   Component Value Date    SODIUM 141 03/02/2022    SODIUM 138 11/29/2021    POTASSIUM 3.7 03/02/2022    POTASSIUM 3.6 11/29/2021    CHLORIDE 103 03/02/2022    CHLORIDE 99 11/29/2021    CO2 25 03/02/2022    CO2 29 11/29/2021    BUN 8 03/02/2022     BUN 10 11/29/2021    CREATININE 0.77 03/02/2022    CREATININE 0.84 11/29/2021    GLUCOSE 123 (H) 03/02/2022    GLUCOSE 173 (H) 11/29/2021     Lab Results   Component Value Date    WBC 15.8 (H) 03/02/2022    WBC 12.6 (H) 11/29/2021    HCT 44.5 03/02/2022    HCT 46.1 11/29/2021    HGB 14.4 03/02/2022    HGB 15.4 11/29/2021     03/02/2022     11/29/2021     XR Chest AP or PA 3/2/2022  No acute process. Concordant. Signed by: Basil Cervantes    ASSESSMENT:    • Nonspecific chest pain.  • Hypertension  • DM type 2  • Systolic click, possible mitral valve prolapse.  • Screening for dyslipidemia.    PLAN:    • Exercise nuclear stress test.  • Echocardiogram.  • Fasting lipids.  • Continue lisinopril.  • Given his DM, he should be on statin therapy.    FOLLOW UP:  Pending results of above tests.    Follow earlier for new or worsening symptoms.  Will call with results.    Bronson Escalera MD, [facility]  3/3/2022    Portions of this note were dictated and transcribed using a voice-activated software. Appropriate corrections to the document have been made, however, areas of commission, omission, or outright mistakes in wording may still exist.

## 2022-03-06 NOTE — PATIENT INSTRUCTIONS
**For crisis resources, please see the information at the end of this document**   Patient Education    Thank you for coming to the [facility] MENTAL HEALTH & ADDICTION [place] [facility]INIC.    Lab Testing:  If you had lab testing today and your results are reassuring or normal they will be mailed to you or sent through [employer] within 7 days. If the lab tests need quick action we will call you with the results. The phone number we will call with results is # [phone]. If this is not the best number please call our clinic and change the number.     Medication Refills:  If you need any refills please call your pharmacy and they will contact us. Our fax number for refills is [phone]. Please allow three business days for refill processing.   If you need to change to a different pharmacy, please contact the new pharmacy directly. The new pharmacy will help you get your medications transferred.     Contact Us:  Please call [phone] during business hours (8-5:00 M-F).  If you have medication related questions after clinic hours, or on the weekend, please call [phone].    Financial Assistance [phone]  Medical Records [phone]       MENTAL HEALTH CRISIS RESOURCES:  For a emergency help, please call 911 or go to the nearest Emergency Department.     Emergency Walk-In Options:   EmPATH Unit @ [facility] ([place]): [phone] - Specialized mental health emergency area designed to be calming  [facility] ([place]): [phone]  [facility] ([place]): [phone]  [facility]): [phone]    County Crisis Information:   [place]: [phone]  [place]: [phone]  [place] ([place]) - Adult: [phone]     Child: [phone]  [place] - Adult: [phone]     Child: [phone]  [place]: [phone]  List of all [facility] resources:    https://[place].gov/dhs/people-we-serve/adults/health-care/mental-health/resources/crisis-contacts.jsp    National Crisis Information:   Crisis Text Line: Text  [place]  to 739475  National Suicide Prevention Lifeline: 0-467-676-TALK (1-584.479.1131)       For online chat options, visit https://suicidepreventionlifeline.org/chat/  Poison Control Center: 9-211-095-7290  Trans Lifeline: 4-534-501-6817 - Hotline for transgender people of all ages  The Tu Project: 7-462-489-3936 - Hotline for LGBT youth     For Non-Emergency Support:   Fast Tracker: Mental Health & Substance Use Disorder Resources -   https://www.SecureLinkn.org/

## 2022-03-06 NOTE — PATIENT INSTRUCTIONS
**For crisis resources, please see the information at the end of this document**   Patient Education    Thank you for coming to the [facility] MENTAL HEALTH & ADDICTION [place] CLINIC.    Lab Testing:  If you had lab testing today and your results are reassuring or normal they will be mailed to you or sent through [employer] within 7 days. If the lab tests need quick action we will call you with the results. The phone number we will call with results is # [phone]. If this is not the best number please call our clinic and change the number.     Medication Refills:  If you need any refills please call your pharmacy and they will contact us. Our fax number for refills is [phone]. Please allow three business days for refill processing.   If you need to change to a different pharmacy, please contact the new pharmacy directly. The new pharmacy will help you get your medications transferred.     Contact Us:  Please call [phone] during business hours (8-5:00 M-F).  If you have medication related questions after clinic hours, or on the weekend, please call [phone].    Financial Assistance [phone]  Medical Records [phone]       MENTAL HEALTH CRISIS RESOURCES:  For a emergency help, please call 911 or go to the nearest Emergency Department.     Emergency Walk-In Options:   EmPATH Unit @ [facility] ([place]): [phone] - Specialized mental health emergency area designed to be calming  [facility] ([place]): [phone]  [facility] ([place]): [phone]  [facility]): [phone]    County Crisis Information:   [place]: [phone]  [place]: [phone]  [place] (SHA) - Adult: [phone]     Child: [phone]  [place] - Adult: [phone]     Child: [phone]  [place]: [phone]  List of all [facility] resources:    https://[place].gov/dhs/people-we-serve/adults/health-care/mental-health/resources/crisis-contacts.jsp    National Crisis Information:   Crisis Text Line: Text  [place]  to 997951  National Suicide Prevention Lifeline: 8-284-234-TALK (1-224.706.1777)       For online chat options, visit https://suicidepreventionlifeline.org/chat/  Poison Control Center: 2-248-652-9401  Trans Lifeline: 2-855-937-1856 - Hotline for transgender people of all ages  The Tu Project: 3-033-430-6903 - Hotline for LGBT youth     For Non-Emergency Support:   Fast Tracker: Mental Health & Substance Use Disorder Resources -   https://www.Ippiesn.org/

## 2022-03-06 NOTE — PATIENT INSTRUCTIONS
**For crisis resources, please see the information at the end of this document**   Patient Education    Thank you for coming to the [facility] MENTAL HEALTH & ADDICTION [place] CLINIC.    Lab Testing:  If you had lab testing today and your results are reassuring or normal they will be mailed to you or sent through [employer] within 7 days. If the lab tests need quick action we will call you with the results. The phone number we will call with results is # [phone]. If this is not the best number please call our clinic and change the number.     Medication Refills:  If you need any refills please call your pharmacy and they will contact us. Our fax number for refills is [phone]. Please allow three business days for refill processing.   If you need to change to a different pharmacy, please contact the new pharmacy directly. The new pharmacy will help you get your medications transferred.     Contact Us:  Please call [phone] during business hours (8-5:00 M-F).  If you have medication related questions after clinic hours, or on the weekend, please call [phone].    Financial Assistance [phone]  Medical Records [phone]       MENTAL HEALTH CRISIS RESOURCES:  For a emergency help, please call 911 or go to the nearest Emergency Department.     Emergency Walk-In Options:   EmPATH Unit @ [place] [facility] ([place]): [phone] - Specialized mental health emergency area designed to be calming  [facility] ([place]): [phone]  [facility] ([place]): [phone]  [facility]): [phone]    County Crisis Information:   [place]: [phone]  [place]: [phone]  [place] (SHA) - Adult: [phone]     Child: [phone]  [place] - Adult: [phone]     Child: [phone]  [place]: [phone]  List of all [facility] resources:    https://mn.gov/dhs/people-we-serve/adults/health-care/mental-health/resources/crisis-contacts.jsp    National Crisis Information:   Crisis Text Line: Text  [place]  to 802811  National Suicide Prevention Lifeline: 3-124-937-TALK (1-288.850.8109)       For online chat options, visit https://suicidepreventionlifeline.org/chat/  Poison Control Center: 0-946-201-7550  Trans Lifeline: 6-255-167-2391 - Hotline for transgender people of all ages  The Tu Project: 0-410-740-1122 - Hotline for LGBT youth     For Non-Emergency Support:   Fast Tracker: Mental Health & Substance Use Disorder Resources -   https://www.NovoEDn.org/

## 2022-03-06 NOTE — PATIENT INSTRUCTIONS
**For crisis resources, please see the information at the end of this document**   Patient Education    Thank you for coming to the [facility] MENTAL HEALTH & ADDICTION [place] CLINIC.    Lab Testing:  If you had lab testing today and your results are reassuring or normal they will be mailed to you or sent through [employer] within 7 days. If the lab tests need quick action we will call you with the results. The phone number we will call with results is # [phone]. If this is not the best number please call our clinic and change the number.     Medication Refills:  If you need any refills please call your pharmacy and they will contact us. Our fax number for refills is [phone]. Please allow three business days for refill processing.   If you need to change to a different pharmacy, please contact the new pharmacy directly. The new pharmacy will help you get your medications transferred.     Contact Us:  Please call [phone] during business hours (8-5:00 M-F).  If you have medication related questions after clinic hours, or on the weekend, please call [phone].    Financial Assistance [phone]  Medical Records [phone]       MENTAL HEALTH CRISIS RESOURCES:  For a emergency help, please call 911 or go to the nearest Emergency Department.     Emergency Walk-In Options:   EmPATH Unit @ [place] [facility] ([place]): [phone] - Specialized mental health emergency area designed to be calming  [facility] ([place]): [phone]  [facility] ([place]): [phone]  [facility]): [phone]    County Crisis Information:   [place]: [phone]  [place]: [phone]  [place] (SHA) - Adult: [phone]     Child: [phone]  [place] - Adult: [phone]     Child: [phone]  [place]: [phone]  List of all [facility] resources:    https://[place].gov/dhs/people-we-serve/adults/health-care/mental-health/resources/crisis-contacts.jsp    National Crisis Information:   Crisis Text Line: Text  [place]  to 809501  National Suicide Prevention Lifeline: 1-398-168-TALK (1-724.333.6579)       For online chat options, visit https://suicidepreventionlifeline.org/chat/  Poison Control Center: 4-874-399-8348  Trans Lifeline: 0-326-367-8481 - Hotline for transgender people of all ages  The Tu Project: 6-871-719-3798 - Hotline for LGBT youth     For Non-Emergency Support:   Fast Tracker: Mental Health & Substance Use Disorder Resources -   https://www.UNI5n.org/

## 2022-03-06 NOTE — PATIENT INSTRUCTIONS
**For crisis resources, please see the information at the end of this document**   Patient Education    Thank you for coming to the [facility] MENTAL HEALTH & ADDICTION [place] [facility]IN[facility].    Lab Testing:  If you had lab testing today and your results are reassuring or normal they will be mailed to you or sent through [employer] within 7 days. If the lab tests need quick action we will call you with the results. The phone number we will call with results is # [phone]. If this is not the best number please call our clinic and change the number.     Medication Refills:  If you need any refills please call your pharmacy and they will contact us. Our fax number for refills is [phone]. Please allow three business days for refill processing.   If you need to change to a different pharmacy, please contact the new pharmacy directly. The new pharmacy will help you get your medications transferred.     Contact Us:  Please call [phone] during business hours (8-5:00 M-F).  If you have medication related questions after clinic hours, or on the weekend, please call [phone].    Financial Assistance [phone]  Medical Records [phone]       MENTAL HEALTH CRISIS RESOURCES:  For a emergency help, please call 911 or go to the nearest Emergency Department.     Emergency Walk-In Options:   EmPATH Unit @ [facility] [facility] ([place]): [phone] - Specialized mental health emergency area designed to be calming  [facility] ([place]): [phone]  [facility] ([place]): [phone]  [facility]): [phone]    County Crisis Information:   [place]: [phone]  [place]: [phone]  [place] (SHA) - Adult: [phone]     Child: [phone]  [place] - Adult: [phone]     Child: [phone]  [place]: [phone]  List of all [facility] resources:    https://[place].gov/dhs/people-we-serve/adults/health-care/mental-health/resources/crisis-contacts.jsp    National Crisis Information:   Crisis Text Line: Text  [place]  to 035225  National Suicide Prevention Lifeline: 0-854-468-TALK (1-987.154.8499)       For online chat options, visit https://suicidepreventionlifeline.org/chat/  Poison Control Center: 4-805-979-5718  Trans Lifeline: 1-672-795-3306 - Hotline for transgender people of all ages  The Tu Project: 5-775-795-9240 - Hotline for LGBT youth     For Non-Emergency Support:   Fast Tracker: Mental Health & Substance Use Disorder Resources -   https://www.Infomousn.org/

## 2022-03-06 NOTE — PATIENT INSTRUCTIONS
**For crisis resources, please see the information at the end of this document**   Patient Education    Thank you for coming to the [facility] [place] [facility].    Lab Testing:  If you had lab testing today and your results are reassuring or normal they will be mailed to you or sent through [employer] within 7 days. If the lab tests need quick action we will call you with the results. The phone number we will call with results is # [phone]. If this is not the best number please call our clinic and change the number.     Medication Refills:  If you need any refills please call your pharmacy and they will contact us. Our fax number for refills is [phone]. Please allow three business days for refill processing.   If you need to change to a different pharmacy, please contact the new pharmacy directly. The new pharmacy will help you get your medications transferred.     Contact Us:  Please call [phone] during business hours (8-5:00 M-F).  If you have medication related questions after clinic hours, or on the weekend, please call [phone].    Financial Assistance [phone]  Medical Records [phone]       MENTAL HEALTH CRISIS RESOURCES:  For a emergency help, please call 911 or go to the nearest Emergency Department.     Emergency Walk-In Options:   EmPATH Unit @ [facility] ([place]): [phone] - Specialized mental health emergency area designed to be calming  [facility] ([place]): [phone]  [facility] ([place]): [phone]  [facility]): [phone]    County Crisis Information:   [place]: [phone]  [place]: [phone]  [place] ([place]) - Adult: [phone]     Child: [phone]  [place] - Adult: [phone]     Child: [phone]  [place]: [phone]  List of all [facility] resources:    https://[place].gov/dhs/people-we-serve/adults/health-care/mental-health/resources/crisis-contacts.jsp    National Crisis Information:   Crisis Text Line: Text  [place]  to 350212  National Suicide Prevention Lifeline: 7-946-013-TALK (1-403.931.4067)       For online chat options, visit https://suicidepreventionlifeline.org/chat/  Poison Control Center: 6-721-757-7255  Trans Lifeline: 6-644-956-7438 - Hotline for transgender people of all ages  The Tu Project: 4-137-174-9231 - Hotline for LGBT youth     For Non-Emergency Support:   Fast Tracker: Mental Health & Substance Use Disorder Resources -   https://www.[place].org/

## 2022-03-07 ENCOUNTER — MYC MEDICAL ADVICE (OUTPATIENT)
Dept: SURGERY | Facility: CLINIC | Age: 57
End: 2022-03-07
Payer: COMMERCIAL

## 2022-03-07 ENCOUNTER — VIRTUAL VISIT (OUTPATIENT)
Dept: PSYCHIATRY | Facility: CLINIC | Age: 57
End: 2022-03-07
Attending: PSYCHOLOGIST
Payer: COMMERCIAL

## 2022-03-07 ENCOUNTER — MYC MEDICAL ADVICE (OUTPATIENT)
Dept: PALLIATIVE MEDICINE | Facility: CLINIC | Age: 57
End: 2022-03-07
Payer: COMMERCIAL

## 2022-03-07 ENCOUNTER — TELEPHONE (OUTPATIENT)
Dept: SURGERY | Facility: CLINIC | Age: 57
End: 2022-03-07
Payer: COMMERCIAL

## 2022-03-07 DIAGNOSIS — F99 INSOMNIA DUE TO OTHER MENTAL DISORDER: ICD-10-CM

## 2022-03-07 DIAGNOSIS — G89.18 POSTOPERATIVE PAIN: Primary | ICD-10-CM

## 2022-03-07 DIAGNOSIS — F33.2 MAJOR DEPRESSIVE DISORDER, RECURRENT EPISODE, SEVERE WITH MIXED FEATURES (H): Primary | ICD-10-CM

## 2022-03-07 DIAGNOSIS — F51.05 INSOMNIA DUE TO OTHER MENTAL DISORDER: ICD-10-CM

## 2022-03-07 PROCEDURE — 90837 PSYTX W PT 60 MINUTES: CPT | Mod: 95 | Performed by: STUDENT IN AN ORGANIZED HEALTH CARE EDUCATION/TRAINING PROGRAM

## 2022-03-07 RX ORDER — OXYCODONE HYDROCHLORIDE 10 MG/1
10 TABLET ORAL EVERY 6 HOURS PRN
Qty: 12 TABLET | Refills: 0 | Status: SHIPPED | OUTPATIENT
Start: 2022-03-07 | End: 2022-03-10

## 2022-03-07 ASSESSMENT — PATIENT HEALTH QUESTIONNAIRE - PHQ9
SUM OF ALL RESPONSES TO PHQ QUESTIONS 1-9: 17
10. IF YOU CHECKED OFF ANY PROBLEMS, HOW DIFFICULT HAVE THESE PROBLEMS MADE IT FOR YOU TO DO YOUR WORK, TAKE CARE OF THINGS AT HOME, OR GET ALONG WITH OTHER PEOPLE: EXTREMELY DIFFICULT
SUM OF ALL RESPONSES TO PHQ QUESTIONS 1-9: 17

## 2022-03-07 NOTE — PROGRESS NOTES
"Jamison is a 56 year old who is being evaluated via a billable video visit.      VIDEO VISIT  Jailene Breen is a 56 year old patient who is being evaluated via a billable video visit.      The patient has been notified of following:   \"We have found that certain health care needs can be provided without the need for an in-person physical exam. This service lets us provide the care you need with a video conversation. If a prescription is necessary we can send it directly to your pharmacy. If lab work is needed we can place an order for that and you can then stop by our lab to have the test done at a later time. Insurers are generally covering virtual visits as they would in-office visits so billing should not be different than normal.  If for some reason you do get billed incorrectly, you should contact the billing office to correct it and that number is in the AVS .    Patient has given verbal consent for video visit?: Yes   How would you like to obtain your AVS?: [employer] SmartPhrase [PsychAVS] has been placed in 'Patient Instructions': Yes      Video- Visit Details  Type of service:  video visit for psychotherapy  Time of service:    Date:  03/07/2022    Video Start Time:  9:00 AM     Video End Time:  9:55AM    Reason for video visit:  Patient unable to travel due to Covid-19  Originating Site (patient location):  [facility]   Location- Patient's home  Distant Site (provider location):  [facility]  Mode of Communication:  Video Conference via AmWell  Consent:  Patient has given verbal consent for video visit?: Yes     Clinician Contact & Progress Note  Department of Psychiatry & Behavioral Sciences  [facility]    Patient: Jailene Breen (1965)     MRN: [id number]  Date of Treatment:  Mar 7, 2022  Duration of Treatment: Start Time: 9:00 AM; End Time: 9:55 AM  Provider: Franklin Breen MD     People present:   Provider: Franklin Cherry  Patient: Jailene YANES" "Jamshid    Encounter Diagnoses   Name Primary?     Major depressive disorder, recurrent episode, severe with mixed features (H) Yes     Insomnia due to other mental disorder        Assessment (current symptoms):      PHQ 2/21/2022 2/28/2022 3/7/2022   PHQ-9 Total Score 19 19 17   Q9: Thoughts of better off dead/self-harm past 2 weeks Nearly every day More than half the days Several days   F/U: Thoughts of suicide or self-harm Yes Yes No   F/U: Self harm-plan No No -   F/U: Self-harm action No No -   F/U: Safety concerns No No No     JEFF-7 SCORE 1/24/2022 2/14/2022 2/14/2022   Total Score - - -   Total Score 9 (mild anxiety) - 20 (severe anxiety)   Total Score 9 20 20       Session Content:     -had surgery Friday, got home yesterday. \"They really got me out of there pretty quick, I should have stayed longer until the dilaudid wore off so I could gauge the pain\". Was prescribed 1-2 days of oxycodone (12 tabs), does not think this was enough.  - Surgery was for hiatal hernia, has a lot of bandages over chest and abdomen  - Sees pain specialist through [facility]. Counseled Les to discuss pain with her before starting suboxone as this can precipitate withdrawal.   - \"Think they rushed me out. They didn't even wheel me out. As soon as I said I was ready to go home I was out the door a half hour later.   - \"But I can take a deep breath now\", got super happy, almost tearful, showing how he could do it  - Can't eat solid foods for 7 weeks, liquid for the next week then soft food for 6 weeks  - Will try to use this time to eat healthier, lose some weight    - Notably, says he cannot finish school due to inability to get student loans now, will explore this next week  - Wife was sole provider last few years, her stress is relieved now with disability coming in   - Thinks mom would not do a family visit, pretty sure about it. Mom has his number blocked. She also has never seen anyone for mental health care, even after deaths.    " "  Suicidal ideation  - Does not report SI or thoughts of death recently. Not sure why Epic automatically tells me patient may be suicidal.  - Reports he doesn't feel good about himself, feels like a failure  - Since admission over the summer has not had any active suicidal intent or planning. Has occasional thoughts of \"wishing I wouldn't wake up, nothing I would actually try myself.   - \" I don't want to die, would actively avoid something if I was in danger, e.g. oncoming traffic\". Reports reasons for staying alive are wife and kid, would not want to hurt family, commitment to health and getting better. Gets support from . Has history of seeking help, calling clinic when thoughts were more severe.   - If suicidal thoughts worsened would call clinic crisis line, go to emergency room. Will provide crisis resources in [place].      Treatment Plan:   1. Depression- be more active (doing things around the house), more motivation, confidence in self, go back to school (degree in psychology)    2. Relationship with bio parents- have a conversation with mom without fighting, give her a hug. Have deeper conversations with bio dad, feel more like a son.     3. Feelings of worthlessness, guilt, self-blame- Less shame, more future oriented     Mental Status Exam:  Alertness: alert  and oriented  Appearance: well groomed  Behavior/Demeanor: cooperative, pleasant and calm, with good  eye contact   Speech: normal  Language: intact. Preferred language identified as English.  Psychomotor: normal or unremarkable  Mood: depressed and anxious  Affect: blunted and appropriate; was congruent to mood; was congruent to content  Thought Process/Associations: unremarkable  Thought Content:  Reports none;  Denies suicidal and violent ideation and delusions  -Suicidal ideation: denies SI, denies intent, and denies plan  -Homicidal Ideation: denies  Perception: Reports none;  Denies auditory hallucinations and visual hallucinations; intact  " "  Insight: good  Judgment: good  Cognition: does  appear grossly intact    Billing for \"Interactive Complexity\"?    No    Franklin Breen MD        Answers for HPI/ROS submitted by the patient on 3/7/2022  If you checked off any problems, how difficult have these problems made it for you to do your work, take care of things at home, or get along with other people?: Extremely difficult  PHQ9 TOTAL SCORE: 17      "

## 2022-03-07 NOTE — TELEPHONE ENCOUNTER
See previous message.  Spoke with Patient in regards to medication.  He stated everything is going well.    Dari Breen MA

## 2022-03-07 NOTE — TELEPHONE ENCOUNTER
Reason for Call:  Other returning call    Detailed comments: returning Dari's call    Phone Number Patient can be reached at: Home number on file [phone] (home)    Best Time: anytime    Can we leave a detailed message on this number? YES    Call taken on 3/7/2022 at 10:04 AM by Casandra Olson

## 2022-03-07 NOTE — PATIENT INSTRUCTIONS
**For crisis resources, please see the information at the end of this document**   Patient Education    Thank you for coming to the [facility] HEALTH & [facility] [place] [facility].    Lab Testing:  If you had lab testing today and your results are reassuring or normal they will be mailed to you or sent through [employer] within 7 days. If the lab tests need quick action we will call you with the results. The phone number we will call with results is # [phone]. If this is not the best number please call our clinic and change the number.     Medication Refills:  If you need any refills please call your pharmacy and they will contact us. Our fax number for refills is [phone]. Please allow three business days for refill processing.   If you need to change to a different pharmacy, please contact the new pharmacy directly. The new pharmacy will help you get your medications transferred.     Contact Us:  Please call [phone] during business hours (8-5:00 M-F).  If you have medication related questions after clinic hours, or on the weekend, please call [phone].    Financial Assistance [phone]  Medical Records [phone]       MENTAL HEALTH CRISIS RESOURCES:  For a emergency help, please call 911 or go to the nearest Emergency Department.     Emergency Walk-In Options:   EmPATH Unit @ [facility] ([place]): [phone] - Specialized mental health emergency area designed to be calming  [facility] ([place]): [phone]  [facility] ([place]): [phone]  [facility]): [phone]    County Crisis Information:   [place]: [phone]  [place]: [phone]  [place] ([place]) - Adult: [phone]     Child: [phone]  [place] - Adult: [phone]     Child: [phone]  [place]: [phone]  List of all [facility] resources:    https://[place].gov/dhs/people-we-serve/adults/health-care/mental-health/resources/crisis-contacts.jsp    National Crisis Information:   Crisis Text Line: Text  [place]  to 600200  National Suicide Prevention Lifeline: 9-570-511-TALK (1-207.594.1148)       For online chat options, visit https://suicidepreventionlifeline.org/chat/  Poison Control Center: 7-373-688-9404  Trans Lifeline: 2-816-483-6710 - Hotline for transgender people of all ages  The Tu Project: 6-008-938-3284 - Hotline for LGBT youth     For Non-Emergency Support:   Fast Tracker: Mental Health & Substance Use Disorder Resources -   https://www.[place].org/

## 2022-03-08 ASSESSMENT — PATIENT HEALTH QUESTIONNAIRE - PHQ9: SUM OF ALL RESPONSES TO PHQ QUESTIONS 1-9: 17

## 2022-03-08 NOTE — TELEPHONE ENCOUNTER
Per patient Daryahart message:  From: Jamison Breen      Created: 3/7/2022 3:50 PM      Hello, I'm just giving you a heads ups that I had surgery last Friday and got out of the hospital on Sunday and why I am on pain meds from the surgeon.          Routed to provider.     Cha RN-BSN  [facility]

## 2022-03-09 NOTE — TELEPHONE ENCOUNTER
Hi Les-     Thanks. I see they prescribed oxycodone 10mg tabs and gave you #12 tabs. I appreciate being kept up to date. I wish you speedy healing.     Susana GRANT RN CNP, XUAN  [facility]    I have sent the above [employer] message to the patient.     Susana GRANT RN CNP, XUAN  [facility]

## 2022-03-09 NOTE — PATIENT INSTRUCTIONS
Plan:    1. Recommended to increase activity while pacing himself  2. Physical Therapy:  Let's do this once you are done with the PHP program  3. Clinical Health Psychologist: keep working with your psychiatrist and therapist  4. Diagnostic Studies:  None  5. Medication Management:    1. restart Suboxone 8/2mg films, use 0.5 film under the tongue Q 12 hours.   2. Oxycodone for pain flare since he is off of his biologics  3. Patient certified for medical cannabis in April 2021  6. Further procedures recommended: none at present  7. Recommendations to PCP: see above  8. Follow up: 6-8 weeks via video due to COVID-19 viral threat. Please call [phone] to make your follow-up appointment with me.   ===============================    Clinic Number:  [phone]     Call with any questions about your care and for scheduling assistance.     Calls are returned Monday through Friday between 8 AM and 4:30 PM. We usually get back to you within 2 business days depending on the issue/request.    If we are prescribing your medications:    For opioid medication refills, call the clinic or send a [employer] message 7 days in advance.  Please include:    Name of requested medication    Name of the pharmacy.    For non-opioid medications, call your pharmacy directly to request a refill. Please allow 3-4 days to be processed.     Per [place] State Law:    All controlled substance prescriptions must be filled within 30 days of being written.      For those controlled substances allowing refills, pickup must occur within 30 days of last fill.      We believe regular attendance is key to your success in our program!      Any time you are unable to keep your appointment we ask that you call us at least 24 hours in advance to cancel.This will allow us to offer the appointment time to another patient.     Multiple missed appointments may lead to dismissal from the clinic.

## 2022-03-09 NOTE — PROGRESS NOTES
Patient failed to return after initial evaluation.  Current status is unknown.  Discharge from PT.     Cellcept Pregnancy And Lactation Text: This medication is Pregnancy Category D and isn't considered safe during pregnancy. It is unknown if this medication is excreted in breast milk.

## 2022-03-16 ENCOUNTER — OFFICE VISIT (OUTPATIENT)
Dept: SURGERY | Facility: CLINIC | Age: 57
End: 2022-03-16
Payer: COMMERCIAL

## 2022-03-16 VITALS
SYSTOLIC BLOOD PRESSURE: 143 MMHG | DIASTOLIC BLOOD PRESSURE: 87 MMHG | WEIGHT: 276.2 LBS | HEART RATE: 67 BPM | BODY MASS INDEX: 33.62 KG/M2

## 2022-03-16 DIAGNOSIS — F32.A DEPRESSION, UNSPECIFIED DEPRESSION TYPE: ICD-10-CM

## 2022-03-16 DIAGNOSIS — Z98.890 S/P LAPAROSCOPIC FUNDOPLICATION: Primary | ICD-10-CM

## 2022-03-16 PROCEDURE — 99024 POSTOP FOLLOW-UP VISIT: CPT | Performed by: SURGERY

## 2022-03-16 NOTE — LETTER
"    3/16/2022         RE: Jailene Breen  [address] [place] [address]        Dear Colleague,    Thank you for referring your patient, Jailene Breen, to the [facility]. Please see a copy of my visit note below.    General Surgery Post Op    Pt returns for follow up visit s/p robotic hiatal hernia repair and toupet fundoplication on 3/4/2022.    Patient has been doing well, tolerating diet.  Has moved into soft diet without problems.  Bowels moving well. Pain controlled. No issues with wound healing/redness/drainage. No fevers.  No heartburn.  Has been off his PPI and no longer needing to take \"Tums like candy\"  No dysphagia.  No regurgitation.  He is noticed that his breathing is much improved as well.    Physical exam: Vitals: BP (!) 143/87   Pulse 67   Wt 125.3 kg (276 lb 3.2 oz)   BMI 33.62 kg/m    BMI= Body mass index is 33.62 kg/m .    Exam:  Constitutional: healthy, alert and no distress  Gastrointestinal: Abdomen soft, non-tender. BS normal. No masses, organomegaly  Incisions healing well.  Some resolving bruising inferior to umbilicus.      Assessment:     ICD-10-CM    1. S/P laparoscopic fundoplication  Z98.890      Plan: Doing well after his hiatal hernia repair and partial fundoplication.  Discussed staying with the soft diet another month to put us about 6 weeks after surgery.  I will follow-up with him at that time hopefully will able to work at transitioning into regular diet again.  Let me know if any concerns arise in the meantime.    Brandon Mack MD          Again, thank you for allowing me to participate in the care of your patient.        Sincerely,        Brandon Mack MD    "

## 2022-03-21 ENCOUNTER — VIRTUAL VISIT (OUTPATIENT)
Dept: PSYCHIATRY | Facility: CLINIC | Age: 57
End: 2022-03-21
Attending: PHYSICAL MEDICINE & REHABILITATION
Payer: COMMERCIAL

## 2022-03-21 DIAGNOSIS — F33.2 MAJOR DEPRESSIVE DISORDER, RECURRENT SEVERE WITHOUT PSYCHOTIC FEATURES (H): Primary | ICD-10-CM

## 2022-03-21 DIAGNOSIS — F41.1 GAD (GENERALIZED ANXIETY DISORDER): ICD-10-CM

## 2022-03-21 PROCEDURE — 90837 PSYTX W PT 60 MINUTES: CPT | Mod: 95 | Performed by: STUDENT IN AN ORGANIZED HEALTH CARE EDUCATION/TRAINING PROGRAM

## 2022-03-21 NOTE — PROGRESS NOTES
"Jamison is a 56 year old who is being evaluated via a billable video visit.      VIDEO VISIT  Jailene Breen is a 56 year old patient who is being evaluated via a billable video visit.      The patient has been notified of following:   \"We have found that certain health care needs can be provided without the need for an in-person physical exam. This service lets us provide the care you need with a video conversation. If a prescription is necessary we can send it directly to your pharmacy. If lab work is needed we can place an order for that and you can then stop by our lab to have the test done at a later time. Insurers are generally covering virtual visits as they would in-office visits so billing should not be different than normal.  If for some reason you do get billed incorrectly, you should contact the billing office to correct it and that number is in the AVS .    Patient has given verbal consent for video visit?: Yes   How would you like to obtain your AVS?: [employer] SmartPhrase [PsychAVS] has been placed in 'Patient Instructions': Yes      Video- Visit Details  Type of service:  video visit for psychotherapy  Time of service:    Date:  03/21/2022    Video Start Time:  9:00 AM     Video End Time:  9:55AM    Reason for video visit:  Patient unable to travel due to Covid-19  Originating Site (patient location):  [facility]   Location- Patient's home  Distant Site (provider location):  [facility]  Mode of Communication:  Video Conference via AmWell  Consent:  Patient has given verbal consent for video visit?: Yes     Clinician Contact & Progress Note  Department of Psychiatry & Behavioral Sciences  [facility]    Patient: Jailene Breen (1965)     MRN: [id number]  Date of Treatment:  Mar 21, 2022  Duration of Treatment: Start Time: 9:00 AM; End Time: 9:55 AM  Provider: Franklin Breen MD     People present:   Provider: Franklin Cherry  Patient: Jailene YANES" "Jamshid    Encounter Diagnoses   Name Primary?     Major depressive disorder, recurrent severe without psychotic features (H) Yes     JEFF (generalized anxiety disorder)        Assessment (current symptoms):      PHQ 2/21/2022 2/28/2022 3/7/2022   PHQ-9 Total Score 19 19 17   Q9: Thoughts of better off dead/self-harm past 2 weeks Nearly every day More than half the days Several days   F/U: Thoughts of suicide or self-harm Yes Yes No   F/U: Self harm-plan No No -   F/U: Self-harm action No No -   F/U: Safety concerns No No No     JEFF-7 SCORE 1/24/2022 2/14/2022 2/14/2022   Total Score - - -   Total Score 9 (mild anxiety) - 20 (severe anxiety)   Total Score 9 20 20       Session Content:     - \"feeling empty in my stomach\" , don't feel very good about myself, \"worthless, left out, everyone else has things going on\". E.g. wife got a raise, brought up me about to graduate and move on. \"I'm not comparing myself to other people\", this inconsistency was addressed. Basically noticing other people are hopeful, excited about things. \"Highlight of my day is being able to take the dogs for a walk\"  - \"I feel at the end of my rainbow, like there's nothing there\"    - Went to [employer], used an electric cart and \"felt everyone judging me\". Joint pain is improving but still there, still has post-surgical pain as well.   - Can't go back to school to finish degree as he signed student loan forgiveness forms. Says he made that choice to \"help out my family\". Can't afford classes out of pocket.   - Reports he did not have much of a future plan when filling out disability forms. Just needed financial support ASAP.   - Also knew his diseases were progressive, he didn't just need time off.     - Thinks he might be more significantly depressed again, alma regarding pronounced hopelessness, negative self image  - \"Wish I would have done things differently when I was younger. I see my son doing what I wanted to do. Travel, enjoy life\".   - " "\"sometimes wish I wasn't here, that I wouldn't wake up. Nothing like I want to do anything myself\"  - \"If I was single and didn't have kids I definitely wouldn't be here. They're everything to me\".     - When asked what would be most helpful to him, he says \"If both my mom and dad gave me a big hug\". When asked about other current supports, says \"No one. My wife and I don't fight anymore, act more like roommates. Don't act like a  couple\". Denies strong emotions in the marriage.     - Discussed his worsened depressive symptoms. Les agrees IOP and PHP would be a good option. Will refer to these programs today.     - Asked about family meeting. Says he couldn't ask mom as she blocked him, \"because I blocked her\".   - Does not know what he wants to do about his mom. Does not think his relationship with bio-dad will ever change.   - \"something's gotta happen with my mom, I either have to accept and move forward... it's just really hard\". \"My mom can't be dealt with, you can't solve problems\". Asked how his siblings deal with her. Says his mom turned his half sister against him, he is barred from a family wedding this summer.      Treatment Plan:   1. Depression- be more active (doing things around the house), more motivation, confidence in self, go back to school (degree in psychology)    2. Relationship with bio parents- have a conversation with mom without fighting, give her a hug. Have deeper conversations with bio dad, feel more like a son.     3. Feelings of worthlessness, guilt, self-blame- Less shame, more future oriented     Mental Status Exam:  Alertness: alert  and oriented  Appearance: well groomed  Behavior/Demeanor: cooperative, pleasant and calm, with good  eye contact   Speech: normal  Language: intact. Preferred language identified as English.  Psychomotor: normal or unremarkable  Mood: depressed and anxious  Affect: blunted and appropriate; was congruent to mood; was congruent to content  Thought " "Process/Associations: unremarkable  Thought Content:  Reports none;  Denies suicidal and violent ideation and delusions  -Suicidal ideation: denies SI, denies intent, and denies plan  -Homicidal Ideation: denies  Perception: Reports none;  Denies auditory hallucinations and visual hallucinations; intact    Insight: good  Judgment: good  Cognition: does  appear grossly intact    Billing for \"Interactive Complexity\"?    No    Franklin Breen MD    "

## 2022-03-21 NOTE — TELEPHONE ENCOUNTER
Folic Acid 1 mg - take 3 tabs per day        Last Written Prescription Date:  3/25/2021  Last Fill Quantity: 150,   # refills: 3  Last Office Visit : 7/16/2021  Future Office visit:  7/18/2022    Routing refill request to provider for review/approval because:  Med lists states patient taking 5 tabs a day but request is for 3 tabs a day. Which is correct?

## 2022-03-25 ENCOUNTER — MYC REFILL (OUTPATIENT)
Dept: RHEUMATOLOGY | Facility: CLINIC | Age: 57
End: 2022-03-25
Payer: COMMERCIAL

## 2022-03-25 DIAGNOSIS — M06.9 RHEUMATOID ARTHRITIS INVOLVING MULTIPLE SITES, UNSPECIFIED WHETHER RHEUMATOID FACTOR PRESENT (H): ICD-10-CM

## 2022-03-25 DIAGNOSIS — Z53.9 ERRONEOUS ENCOUNTER--DISREGARD: Primary | ICD-10-CM

## 2022-03-25 PROBLEM — F11.10 NARCOTIC ABUSE, CONTINUOUS (H): Status: ACTIVE | Noted: 2017-02-20

## 2022-03-25 PROBLEM — Z12.11 ENCOUNTER FOR SCREENING COLONOSCOPY: Status: ACTIVE | Noted: 2022-03-25

## 2022-03-25 RX ORDER — DOXYCYCLINE 100 MG/1
100 TABLET ORAL
COMMUNITY
End: 2022-05-02

## 2022-03-25 RX ORDER — ONDANSETRON 4 MG/1
TABLET, ORALLY DISINTEGRATING ORAL
COMMUNITY
Start: 2022-03-06 | End: 2022-08-31

## 2022-03-25 RX ORDER — ERGOCALCIFEROL 1.25 MG/1
CAPSULE, LIQUID FILLED ORAL
COMMUNITY
Start: 2021-12-30 | End: 2023-05-15

## 2022-03-25 RX ORDER — SIMETHICONE 80 MG
TABLET,CHEWABLE ORAL
COMMUNITY
Start: 2022-03-06 | End: 2022-05-02

## 2022-03-26 RX ORDER — METHOTREXATE 2.5 MG/1
TABLET ORAL
Qty: 48 TABLET | Refills: 0 | Status: CANCELLED | OUTPATIENT
Start: 2022-03-26

## 2022-03-26 RX ORDER — FOLIC ACID 1 MG/1
TABLET ORAL
Qty: 180 TABLET | Refills: 2 | Status: SHIPPED | OUTPATIENT
Start: 2022-03-26 | End: 2022-08-31

## 2022-03-26 NOTE — TELEPHONE ENCOUNTER
methotrexate sodium 2.5 MG TABS  Last Written Prescription Date:10/15/21  Last Fill Quantity: 48,   # refills: 0  Last Office Visit: 7/16/21  Future Office visit: 7/18/21    CBC RESULTS: Recent Labs   Lab Test 10/11/21  1123   WBC 7.0   RBC 5.41   HGB 16.2   HCT 46.1   MCV 85   MCH 29.9   MCHC 35.1   RDW 13.3          Creatinine   Date Value Ref Range Status   10/11/2021 0.91 0.66 - 1.25 mg/dL Final   06/16/2021 1.09 0.66 - 1.25 mg/dL Final   ]    Liver Function Studies -   Recent Labs   Lab Test 10/11/21  1123   PROTTOTAL 8.2   ALBUMIN 3.9   BILITOTAL 0.7   ALKPHOS 102   AST 37   ALT 47       Routing refill request to provider for review/approval because: labs past due.    RECENT OUTSIDE LABS AVAILABLE IN THE LAB TAB OR CARE EVERYWHERE.

## 2022-03-27 RX ORDER — METHOTREXATE 2.5 MG/1
10 TABLET ORAL
Qty: 48 TABLET | Refills: 0 | Status: SHIPPED | OUTPATIENT
Start: 2022-03-27 | End: 2022-07-12

## 2022-03-29 ENCOUNTER — TELEPHONE (OUTPATIENT)
Dept: BEHAVIORAL HEALTH | Facility: CLINIC | Age: 57
End: 2022-03-29
Payer: COMMERCIAL

## 2022-03-29 NOTE — PROGRESS NOTES
Video- Visit Details  Type of service:  video visit for medication management  Time of service:    Date:  03/30/2022    Video Start Time: 1:01 PM               Video End Time: 1:30 PM    Reason for video visit:  Patient unable to travel due to Covid-19  Originating Site (patient location):  [facility]   Location- Patient's home  Distant Site (provider location):  Remote location  Mode of Communication:  Video Conference via [employer]Well  Consent:  Patient has given verbal consent for video visit?: Yes        [facility]  Psychiatry Clinic  PSYCHIATRY PROGRESS NOTE     CARE TEAM:    PCP- Aiden Resendez   Rheumatology- Raghu Parada MD  [facility]- Susana GRANT; Santa Farrell PsyD  Therapist - MD Sravanthi Weststephenie Breen is a 56 year old patient who prefers the name Jamison and uses pronouns he, him, his.   PERTINENT BACKGROUND                                 [most recent eval 09/21/20]   Psych pertinent item history includes suicidal ideation, SIB [cutting], mutiple psychotropic trials , severe med reaction, psych hosp (<3), SUBSTANCE USE: alcohol, substance use treatment  and Major Medical Problems (Rheumatoid Arthritis and Ankylosing Spondylitis)    DIAGNOSES                       Major Depressive Disorder, recurrent, severe, without psychotic features  Generalized Anxiety Disorder, with panic    Chronic Pain  Ankylosing Spondylitis    ASSESSMENT                       Today, Jamison reports worsening of depression since last appointment. He cites several stressors including recent surgery and recovery, animals being ill, chronic pain, and insurance change requiring changing therapists. He denies any current suicidal ideation. Last spring he also had significant worsening of depression, is unable to identify any specific trigger and denies history of worsening of symptoms during this time of year normally. He does report some new  "physical symptoms of sweating, feeling hot, and flushing, currently on Cytomel, last TSH WNL but will recheck and consider discontinuation. Did have initial response to starting fluoxetine, will increase today and have close follow-up. May need to consider switch to alternative agent.      [place] was [place] today:  Indicates no medication misuse .    PLAN                                                                                   1) Medications:  -increase fluoxetine 60mg daily   -quetiapine 50mg at bedtime  -quetiapine 25-50mg as needed for anxiety and panic  -liothyronine 25mcg daily    2) Therapy- psychodynamic    3) Next Due   Labs- AP monitoring labs due 9/2022  EKG- as needed  Rating scales- serial PHQ9s    4) Referrals  none today    5) RTC: 5 weeks    6) Crisis Numbers:   Provided routinely in AVS     After hours:  [phone]    INTERIM HISTORY        The patient reports good treatment adherence.  History was provided by the patient who was a good historian.     Since the last visit:   -\"I'm here\", \"having a big whole in my gut feeling recently\"  -Has been having troubles with dogs recently, which has kept him busy and his mind occupied. But, does cause some additional stress, especially when his dog is having a seizure.  -Also feeling \"alone\" and that will need to be terminate with his current therapist and get to know a new therapist, has intake set up for tomorrow.  -Feeling \"not useful\" and worthless.  -\"Feel lost.\"   -Sleep has been pretty poor, \"couple hours here and there.\" Due to pain, recovery from surgery, and \"a lot going on in my mind.\"  -Wife had received bonus from work and a nice card but he doesn't feel like he has anything. Is two classes away from associates degree, hoping to complete that in the future. Enjoyed school because he felt \"like a part of something.\" Is looking toward some volunteer opportunities, but is currently recovering from surgery.  -Also has some fears about his " "mother passing away before he is able to make up with her. But he \"can't be the one who calls her first\" as he has \"done it too many times.\" He wants answers about why his kids don't get the same attention as his kids.  -Sometimes wishes he wouldn't wake up but no suicidal plans or intent.   -Noticing some chest/stomach discomfort (no vomiting since surgery), sweating, and flushing (requiring him to turn on the fan).  -Noticing \"a few\" mild panic attacks, \"just enough to make me feel uncomfortable.\"  -Does feel like fluoxetine has been helpful in the past.     RECENT PSYCH ROS:   Depression: depressed mood, excessive guilt and indecisiveness   Elevated:  none  Psychosis:  none  Anxiety:  as above  Trauma Related:  none  Dysregulation:  none  Eating Disorder: no  Other: no    Adverse Effects:  denies  Pertinent Negative Symptoms: No suicidal ideation, violent ideation, aggression, psychosis, hallucinations, delusions, otto and hypomania    Recent Substance Use:     Alcohol- none since , used to attend AA meetings prior to COVID.  Tobacco- denies  Caffeine- yes  Cannabis- medical cannabis  Opioids- as prescribed           Narcan Kit- prescribed   Other illicit drugs- none    FAMILY and SOCIAL HISTORY                                      [per patient report]            Family Hx:  Mom - depression (since  when sister )    Social Hx:  Financial/ Work- currently attending college at [employer], is hoping to start school at [employer] for a degree in psychology after being unable to work due to diagnosis of rheumatoid arthritis and ankylosing spondylitis  Partner/ -  in .  Children- 25 and 27 year old kids      Living situation- [place], house with wife and 2 kids      Social/ Spiritual Support- Talks to  every two weeks for lunch, but \"doesn't really have friends\". Family is supportive.      PSYCH and SUBSTANCE USE Critical Summary Points since  - started " escitalopram  November - increased escitalopram 20mg daily  February 2021 - stopped hydroxyzine; started trazodone 50mg at bedtime and 25mg daily as needed for anxiety  March/April - Patient hospitalized started on Abilify, titrated to 5mg daily; escitalopram switched to fluoxetine 40mg daily; started prazosin 1mg at bedtime. Pain management switched oxycodone to Suboxone and started medical marijuana.  May - patient still taking hydroxyzine, discontinued today.  June - patient hospitalized (6/15/21) discontinued Abilify and trazodone, started on quetiapine & titrated to 100mg at bedtime and 25mg as needed for sleep.   August - increase quetiapine 150mg   November - discontinued quetiapine 150mg (patient ran out of medications and did not take for several weeks).  December - patient restarted quetiapine 50mg at bedtime   March - increased fluoxetine 60mg        Medication  Dose   (mg) Effect  Dates of Use   fluoxetine 40 Felt like was initially helpful 2016 - 2017   sertraline 100 Effective initially, eventually self discontinued as not helpful 2018 - 7/2020   duloxetine 30 BID Used to treat nerve pain; felt weird on it 2017   bupropion  BID ineffective 2017 - 2019   nortriptyline 50 For pain 2017 -  3/2019   mirtazapine   Dissociated for entire first week after taking 2012   trazodone 50   2013             hydroxyzine 25 QID prn For anxiety and panic attacks; not effective for severe anxiety 2015 - 2017   buspirone 15 TID   2016 - 2017   gabapentin 100 TID   2013 - 2016   alprazolam 0.5 TID For anxiety 3511-7477   diazepam 2 BID For anxiety 2016   lorazepam 1 TID For anxiety 2016   clonazepam 1 For anxiety 2016 - present          MEDICAL HISTORY  and ALLERGY     ALLERGIES: Mirtazapine, Hydrocodone, Ms contin [morphine], and Remeron soltab     Patient Active Problem List   Diagnosis     CARDIOVASCULAR SCREENING; LDL GOAL LESS THAN 160     Obesity, Class I, BMI 30-34.9     Tear meniscus knee, right, initial  encounter     Rheumatoid arthritis involving multiple sites, unspecified rheumatoid factor presence     Chronic pain     Right-sided thoracic back pain, unspecified chronicity     Generalized anxiety disorder     Panic attack     Ankylosing spondylitis (H)     Tobacco use disorder     Moderate major depression (H)     Immunocompromised (H)     Essential hypertension with goal blood pressure less than 140/90     Suicidal ideation     Insomnia due to other mental disorder     Major depressive disorder, recurrent episode, severe with mixed features (H)     Chronic back pain greater than 3 months duration     Contact dermatitis and eczema     Dermatitis     Drug dependence (H)     Encounter for screening colonoscopy     Esophageal reflux     Hematuria     Hyperlipidemia     Lumbar radiculopathy     Plantar fascial fibromatosis     Sprain of sacroiliac region     Overweight     Nonintractable migraine     Narcotic abuse, continuous (H)         MEDICAL REVIEW OF SYSTEMS     Contraception- none  A comprehensive review of systems was performed and is negative other than noted in the HPI.    MEDICATIONS          Current Outpatient Medications   Medication Sig Dispense Refill     acetaminophen (TYLENOL) 500 MG tablet Take 1,000 mg by mouth every 6 hours as needed for mild pain (headache)       buprenorphine HCl-naloxone HCl (SUBOXONE) 8-2 MG per film Place 0.5 Film under the tongue 2 times daily Max 1 film per day. Fill 3/5/22 and start 3/7/22 to last 30 days for chronic pain 30 each 0     doxycycline monohydrate (ADOXA) 100 MG tablet Take 100 mg by mouth       etanercept (ENBREL SURECLICK) 50 MG/ML autoinjector Inject 50 mg Subcutaneous once a week 1 mL 3     FLUoxetine (PROZAC) 40 MG capsule Take 1 capsule (40 mg) by mouth daily 30 capsule 2     folic acid (FOLVITE) 1 MG tablet TAKE THREE TABLETS BY MOUTH EVERY  tablet 2     liothyronine (CYTOMEL) 25 MCG tablet Take 1 tablet (25 mcg) by mouth daily 30 tablet 2      "medical cannabis (Patient's own supply) See Admin Instructions (The purpose of this order is to document that the patient reports taking medical cannabis.  This is not a prescription, and is not used to certify that the patient has a qualifying medical condition.)     Pills PRN   Lozenges PRN       methotrexate sodium 2.5 MG TABS Take 4 tablets (10 mg) by mouth every 7 days Labs past due. Order in computer.Please have drawn, IMPORTANT 48 tablet 0     nicotine polacrilex (NICORETTE) 4 MG gum CHEW AND PARK 1 PIECE INSIDE CHEEK AS NEEDED 200 each 1     ondansetron (ZOFRAN-ODT) 4 MG ODT tab DISSOLVE TWO TABLETS BY MOUTH EVERY 12 HOURS AS NEEDED FOR NAUSEA       predniSONE (DELTASONE) 5 MG tablet Take 1 tablet (5 mg) by mouth daily 100 tablet 1     QUEtiapine (SEROQUEL) 50 MG tablet Take 1 tablet (50 mg) by mouth At Bedtime May take additional 25-50mg (1/2 - 1 tablet) daily prn for anxiety/panic attacks 45 tablet 2     simethicone (MYLICON) 80 MG chewable tablet CHEW 1-2 TABLETS BY MOUTH EVERY 6 HOURS AS NEEDED       vitamin D2 (ERGOCALCIFEROL) 46001 units (1250 mcg) capsule TAKE 1 CAPSULE BY MOUTH EVERY MONDAY AND THURSDAY       VITALS                     There were no vitals taken for this visit.   MENTAL STATUS EXAM         Alertness: alert  and oriented  Appearance: adequately groomed  Behavior/Demeanor: cooperative, with good  eye contact   Speech: regular rate and rhythm  Language: intact  Psychomotor: normal or unremarkable  Mood: \"really down\"   Affect: appropriate; congruent to: mood- yes, content- yes  Thought Process/Associations: unremarkable  Thought Content:  Reports none;  Denies suicidal ideation, violent ideation and delusions   Perception:  Reports none;  Denies auditory hallucinations and visual hallucinations  Insight: good  Judgment: good  Cognition: (6) oriented: time, person, and place  attention span: intact  concentration: intact  recent memory: intact  remote memory: intact  fund of knowledge: " appropriate  Gait and Station: N/A (telehealth)    LABS and DATA     PHQ9 TODAY = 15  PHQ 2/21/2022 2/28/2022 3/7/2022   PHQ-9 Total Score 19 19 17   Q9: Thoughts of better off dead/self-harm past 2 weeks Nearly every day More than half the days Several days   F/U: Thoughts of suicide or self-harm Yes Yes No   F/U: Self harm-plan No No -   F/U: Self-harm action No No -   F/U: Safety concerns No No No       Recent Labs   Lab Test 10/11/21  1123 06/16/21  0741   CR 0.91 1.09   GFRESTIMATED >90 77     Recent Labs   Lab Test 10/11/21  1123 06/16/21  0741   AST 37 23   ALT 47 33   ALKPHOS 102 88     Recent Labs   Lab Test 10/11/21  1123 06/16/21  0741   GLC 97 92   A1C 5.2  --      Recent Labs   Lab Test 10/11/21  1123   CHOL 209*   TRIG 225*   *   HDL 30*     Recent Labs   Lab Test 10/11/21  1123 06/16/21  0741   AST 37 23   ALT 47 33   ALKPHOS 102 88     Recent Labs   Lab Test 10/11/21  1123 06/16/21  0741 03/21/21  0808 03/17/21  0748   WBC 7.0 6.1 6.8 8.3   ANEU  --   --  3.7 4.4   HGB 16.2 14.6 15.6 15.0    166 246 211     PSYCHOTROPIC DRUG INTERACTIONS   Increased risk of QTc prolongation: fluoxetine, quetiapine, Suboxone  Increased risk of serotonin syndrome: fluoxetine, Suboxone  Increased risk of CNS/respiratory depression: Suboxone, quetiapine    MANAGEMENT:  Monitoring for adverse effects and patient is aware of risks    RISK STATEMENT for SAFETY     Today, Jamison Breen does not report any suicidal ideation and does not appear to be an imminent risk to self or others.    SUICIDE RISK ASSESSMENT-  Risk factors for self-harm: previous suicide attempt, recent psych inpt , financial/legal stress, significant pain and takes opiates.  Mitigating factors: h/o seeking help , commitment to family and participation in DBT or day program.  The patient does not appear to be at imminent risk for self-harm, hospitalization is recommended which the pt does not agree to. No hospitalization will be arranged.  Safety plan reviewed today including contacting family, friends (), day treatment team, clinic, or crisis line.    TREATMENT RISK STATEMENT:  The risks, benefits, alternatives and potential adverse effects have been discussed and are understood by the pt. The pt understands the risks of using street drugs or alcohol. There are no medical contraindications, the pt agrees to treatment with the ability to do so. The pt knows to call the clinic for any problems or to access emergency care if needed.  Medical and substance use concerns are documented above.  Psychotropic drug interaction check was done, including changes made today.    PROVIDER: Slim Sage MD    Patient was not staffed.  Supervisor is Dr. Downing who will sign the note.

## 2022-03-30 ENCOUNTER — VIRTUAL VISIT (OUTPATIENT)
Dept: PSYCHIATRY | Facility: CLINIC | Age: 57
End: 2022-03-30
Attending: PSYCHIATRY & NEUROLOGY
Payer: COMMERCIAL

## 2022-03-30 DIAGNOSIS — E55.9 VITAMIN D DEFICIENCY: ICD-10-CM

## 2022-03-30 DIAGNOSIS — F32.A DEPRESSION, UNSPECIFIED DEPRESSION TYPE: ICD-10-CM

## 2022-03-30 DIAGNOSIS — Z79.899 ENCOUNTER FOR LONG-TERM CURRENT USE OF MEDICATION: Primary | ICD-10-CM

## 2022-03-30 DIAGNOSIS — F41.9 ANXIETY: ICD-10-CM

## 2022-03-30 PROCEDURE — 99214 OFFICE O/P EST MOD 30 MIN: CPT | Mod: 95 | Performed by: STUDENT IN AN ORGANIZED HEALTH CARE EDUCATION/TRAINING PROGRAM

## 2022-03-30 RX ORDER — LIOTHYRONINE SODIUM 25 UG/1
25 TABLET ORAL DAILY
Qty: 30 TABLET | Refills: 2 | Status: SHIPPED | OUTPATIENT
Start: 2022-03-30 | End: 2022-05-11

## 2022-03-30 RX ORDER — QUETIAPINE FUMARATE 50 MG/1
50 TABLET, FILM COATED ORAL AT BEDTIME
Qty: 45 TABLET | Refills: 2 | Status: SHIPPED | OUTPATIENT
Start: 2022-03-30 | End: 2022-05-11

## 2022-03-30 NOTE — PATIENT INSTRUCTIONS
**For crisis resources, please see the information at the end of this document**   Patient Education    Thank you for coming to the [facility] [place] [facility].    Lab Testing:  If you had lab testing today and your results are reassuring or normal they will be mailed to you or sent through Mobile[employer] within 7 days. If the lab tests need quick action we will call you with the results. The phone number we will call with results is # [phone]. If this is not the best number please call our clinic and change the number.     Medication Refills:  If you need any refills please call your pharmacy and they will contact us. Our fax number for refills is [phone]. Please allow three business days for refill processing.   If you need to change to a different pharmacy, please contact the new pharmacy directly. The new pharmacy will help you get your medications transferred.     Contact Us:  Please call [phone] during business hours (8-5:00 M-F).  If you have medication related questions after clinic hours, or on the weekend, please call [phone].    Financial Assistance [phone]  Medical Records [phone]       MENTAL HEALTH CRISIS RESOURCES:  For a emergency help, please call 911 or go to the nearest Emergency Department.     Emergency Walk-In Options:   [facility] Unit @ [facility] ([place]): [phone] - Specialized mental health emergency area designed to be calming  [facility] ([place]): [phone]  [facility] ([place]): [phone]  [facility]): [phone]    County Crisis Information:   [place]: [phone]  [place]: [phone]  [place] ([place]) - Adult: [phone]     Child: [phone]  [place] - Adult: [phone]     Child: [phone]  [place]: [phone]  List of all [facility] resources:    https://[place].gov/dhs/people-we-serve/adults/health-care/mental-health/resources/crisis-contacts.jsp    National Crisis Information:   Crisis Text Line: Text  [place]  to 493737  National Suicide Prevention Lifeline: 6-136-806-TALK (1-844.290.3083)       For online chat options, visit https://suicidepreventionlifeline.org/chat/  Poison Control Center: 9-846-058-3665  Trans Lifeline: 0-723-582-8138 - Hotline for transgender people of all ages  The Tu Project: 4-402-022-7392 - Hotline for LGBT youth     For Non-Emergency Support:   Fast Tracker: Mental Health & Substance Use Disorder Resources -   https://www.Neuroware.ion.org/

## 2022-03-30 NOTE — PROGRESS NOTES
Jailene Breen is a 56 year old who has consented to receive services via billable video visit.      Pt will join video visit via: [employer]  If there are problems joining the visit, send backup video invite via: Text to preferred phone: [phone]      Originating Location (patient location): Patient's home  Distant Location (provider location): [facility] [place] [facility]    Will anyone else be joining the video visit? No    How would you prefer to obtain AVS?: Shahram Sim VF

## 2022-03-31 ENCOUNTER — HOSPITAL ENCOUNTER (OUTPATIENT)
Dept: BEHAVIORAL HEALTH | Facility: CLINIC | Age: 57
Discharge: HOME | End: 2022-03-31
Attending: STUDENT IN AN ORGANIZED HEALTH CARE EDUCATION/TRAINING PROGRAM | Admitting: STUDENT IN AN ORGANIZED HEALTH CARE EDUCATION/TRAINING PROGRAM
Payer: COMMERCIAL

## 2022-03-31 DIAGNOSIS — F33.2 MAJOR DEPRESSIVE DISORDER, RECURRENT SEVERE WITHOUT PSYCHOTIC FEATURES (H): ICD-10-CM

## 2022-03-31 DIAGNOSIS — F41.1 GAD (GENERALIZED ANXIETY DISORDER): ICD-10-CM

## 2022-03-31 PROCEDURE — 90791 PSYCH DIAGNOSTIC EVALUATION: CPT | Mod: GT,95 | Performed by: COUNSELOR

## 2022-03-31 ASSESSMENT — COLUMBIA-SUICIDE SEVERITY RATING SCALE - C-SSRS
2. HAVE YOU ACTUALLY HAD ANY THOUGHTS OF KILLING YOURSELF IN THE PAST MONTH?: YES
3. HAVE YOU BEEN THINKING ABOUT HOW YOU MIGHT KILL YOURSELF?: NO
BASED ON RESPONSES TO C-SSRS QS 1-6, WHAT IS THE PATIENT'S OVERALL RISK RATING FOR SUICIDE: MODERATE RISK
6. HAVE YOU EVER DONE ANYTHING, STARTED TO DO ANYTHING, OR PREPARED TO DO ANYTHING TO END YOUR LIFE?: YES
1. IN THE PAST MONTH, HAVE YOU WISHED YOU WERE DEAD OR WISHED YOU COULD GO TO SLEEP AND NOT WAKE UP?: YES
4. HAVE YOU HAD THESE THOUGHTS AND HAD SOME INTENTION OF ACTING ON THEM?: NO
5. HAVE YOU STARTED TO WORK OUT OR WORKED OUT THE DETAILS OF HOW TO KILL YOURSELF? DO YOU INTEND TO CARRY OUT THIS PLAN?: NO

## 2022-03-31 ASSESSMENT — ANXIETY QUESTIONNAIRES
4. TROUBLE RELAXING: MORE THAN HALF THE DAYS
3. WORRYING TOO MUCH ABOUT DIFFERENT THINGS: SEVERAL DAYS
GAD7 TOTAL SCORE: 9
2. NOT BEING ABLE TO STOP OR CONTROL WORRYING: SEVERAL DAYS
6. BECOMING EASILY ANNOYED OR IRRITABLE: SEVERAL DAYS
7. FEELING AFRAID AS IF SOMETHING AWFUL MIGHT HAPPEN: NOT AT ALL
5. BEING SO RESTLESS THAT IT IS HARD TO SIT STILL: MORE THAN HALF THE DAYS
1. FEELING NERVOUS, ANXIOUS, OR ON EDGE: MORE THAN HALF THE DAYS

## 2022-03-31 ASSESSMENT — PATIENT HEALTH QUESTIONNAIRE - PHQ9: SUM OF ALL RESPONSES TO PHQ QUESTIONS 1-9: 18

## 2022-03-31 NOTE — PROGRESS NOTES
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   [id number]    SAFETY PLAN:    Step 1: Warning signs / cues (Thoughts, images, mood, situation, behavior) that a crisis may be developing:      Thoughts: \"People would be better off without me\", \"I'm a burden\", \"I just want this to end\" and \"Nothing makes it better\"    Images: i wish i would not wake up, if i was driving a vehicle in incomimng traffic and would veer away    Thinking Processes: highly critical and negative thoughts: negative self-image and worthless, left out, everyone else has things going on\".    Mood: worsening depression, hopelessness and helplessness    Behaviors: isolating/withdrawing , not taking care of myself, not taking care of my responsibilities and not sleeping enough    Situations: changes in symptoms: hopelessness, depression getting worse, pain, relationship problems and medical condition / diagnosis: Rheumatoid arthritis        Step 2: Coping strategies - Things I can do to take my mind off of my problems without contacting another person (relaxation technique, physical activity):      Distress Tolerance Strategies:  play with my pet , listen to positive and upbeat music: 80s Rock & Roll and paced " "breathing/progressive muscle relaxation    Physical Activities: go for a walk, deep breathing and stretching     Focus on helpful thoughts:  cuddling with my dogs na dtalking with my wife and kids, go for a nice long ride    Step 3: People and social settings that provide distraction:     Name: Papo Yanez and Domingo Lara, friend through AA he could reach out. Pt has failed to provide any phone numbers.   pet store/[employer] and support group (i.e. twelve-step)     Step 4: Remind myself of people and things that are important to me and worth living for:  \"my family, and being able to see my grand children.\"    Step 5: When I am in crisis, I can ask these people to help me use my safety plan:     Name: Beau Barros, a couple friends that I know through AA.   Name: DEL SHULTZ Phone: [phone] (Mobile)    Step 6: Making the environment safe:       be around others    Step 7: Professionals or agencies I can contact during a crisis:      Suicide Prevention Lifeline: 8-462-232-AHTO (6059)    Crisis Text Line Service (available 24 hours a day, 7 days a week): Text [place] to 594656    Call  **CRISIS (166530) from a cell phone to talk to a team of professionals who can help you.    Crisis Services By [facility]: Phone Number:   [place]     [phone]   [place]    [phone]   [place]    [phone]   [place]    [phone]   [place]    [phone]   [place] [phone]   [place]     [phone]       Call 911 or go to my nearest emergency department.     I helped develop this safety plan and agree to use it when needed.  I have been given a copy of this plan.      Client signature _________________________________________________________________  Today s date:  3/31/2022  Adapted from Safety Plan Template 2008 Latanya Hooper and Isac Talbert is reprinted with the express permission of the authors.  No portion of the Safety Plan Template may be reproduced without the express, written permission.  You " can contact the authors at bhs@[email or web address] or [email or web address]            LOCUS Worksheet     Name: Jailene Breen MRN: [id number]    : 1965      Gender:  male    PMI:  NA   Provider Name: [employer] [place]   Provider NPI:  [id number]    Actual level of Care Provided:  Medications and Therapy.    Service(s) receiving or referred to:  ADT or IOP    Reason for Variance: Due to mental health symptoms worsening and decline in functioning.      Rating completed by: Iván Guzman LPCC, LADC      I. Risk of Harm:   2      Low Risk of Harm    II. Functional Status:   3      Moderate Impairment    III. Co-Morbidity:   1      No Co-Morbidity    IV - A. Recovery Environment - Level of Stress:   3      Moderately Stress Environment    IV - B. Recovery Environment - Level of Support:   3      Limited Support in Environment    V. Treatment and Recovery History:   3      Moderate to Equivocal Response to Treatment and Recovery Management    VI. Engagement and Recovery Project:   3      Limited Engagement and Recovery       18 Composite Score    Level of Care Recommendation:   17 to 19       High Intensity Community Based Services

## 2022-04-01 ASSESSMENT — ANXIETY QUESTIONNAIRES: GAD7 TOTAL SCORE: 9

## 2022-04-04 ENCOUNTER — VIRTUAL VISIT (OUTPATIENT)
Dept: PSYCHIATRY | Facility: CLINIC | Age: 57
End: 2022-04-04
Attending: PHYSICAL MEDICINE & REHABILITATION
Payer: COMMERCIAL

## 2022-04-04 DIAGNOSIS — F33.2 MAJOR DEPRESSIVE DISORDER, RECURRENT EPISODE, SEVERE WITH MIXED FEATURES (H): Primary | ICD-10-CM

## 2022-04-04 PROCEDURE — 90837 PSYTX W PT 60 MINUTES: CPT | Mod: U7 | Performed by: STUDENT IN AN ORGANIZED HEALTH CARE EDUCATION/TRAINING PROGRAM

## 2022-04-04 ASSESSMENT — ANXIETY QUESTIONNAIRES
GAD7 TOTAL SCORE: 11
GAD7 TOTAL SCORE: 11
3. WORRYING TOO MUCH ABOUT DIFFERENT THINGS: SEVERAL DAYS
2. NOT BEING ABLE TO STOP OR CONTROL WORRYING: SEVERAL DAYS
7. FEELING AFRAID AS IF SOMETHING AWFUL MIGHT HAPPEN: SEVERAL DAYS
GAD7 TOTAL SCORE: 11
5. BEING SO RESTLESS THAT IT IS HARD TO SIT STILL: MORE THAN HALF THE DAYS
7. FEELING AFRAID AS IF SOMETHING AWFUL MIGHT HAPPEN: SEVERAL DAYS
1. FEELING NERVOUS, ANXIOUS, OR ON EDGE: MORE THAN HALF THE DAYS
4. TROUBLE RELAXING: NEARLY EVERY DAY
6. BECOMING EASILY ANNOYED OR IRRITABLE: SEVERAL DAYS

## 2022-04-04 ASSESSMENT — PATIENT HEALTH QUESTIONNAIRE - PHQ9
SUM OF ALL RESPONSES TO PHQ QUESTIONS 1-9: 17
SUM OF ALL RESPONSES TO PHQ QUESTIONS 1-9: 17
10. IF YOU CHECKED OFF ANY PROBLEMS, HOW DIFFICULT HAVE THESE PROBLEMS MADE IT FOR YOU TO DO YOUR WORK, TAKE CARE OF THINGS AT HOME, OR GET ALONG WITH OTHER PEOPLE: EXTREMELY DIFFICULT

## 2022-04-04 NOTE — PROGRESS NOTES
"Jamison is a 56 year old who is being evaluated via a billable video visit.      VIDEO VISIT  Jailene Breen is a 56 year old patient who is being evaluated via a billable video visit.      The patient has been notified of following:   \"We have found that certain health care needs can be provided without the need for an in-person physical exam. This service lets us provide the care you need with a video conversation. If a prescription is necessary we can send it directly to your pharmacy. If lab work is needed we can place an order for that and you can then stop by our lab to have the test done at a later time. Insurers are generally covering virtual visits as they would in-office visits so billing should not be different than normal.  If for some reason you do get billed incorrectly, you should contact the billing office to correct it and that number is in the AVS .    Patient has given verbal consent for video visit?: Yes   How would you like to obtain your AVS?: [employer] Smart[employer]rase [PsychAVS] has been placed in 'Patient Instructions': Yes      Video- Visit Details  Type of service:  video visit for psychotherapy  Time of service:    Date:  04/04/2022    Video Start Time:  9:00 AM     Video End Time:  9:55AM    Reason for video visit:  Patient unable to travel due to Covid-19  Originating Site (patient location):  [facility]   Location- Patient's home  Distant Site (provider location):  [facility]  Mode of Communication:  Video Conference via [employer]  Consent:  Patient has given verbal consent for video visit?: Yes     Clinician Contact & Progress Note  Department of Psychiatry & Behavioral Sciences  [facility]    Patient: Jailene Breen (1965)     MRN: [id number]  Date of Treatment:  Apr 4, 2022  Duration of Treatment: Start Time: 9:00 AM; End Time: 9:55 AM  Provider: Franklin Breen MD     People present:   Provider: Franklin Cherry  Patient: Jailene YANES" "Jamshid    Encounter Diagnosis   Name Primary?     Major depressive disorder, recurrent episode, severe with mixed features (H) Yes       Assessment (current symptoms):      PHQ 3/7/2022 3/31/2022 4/4/2022   PHQ-9 Total Score 17 18 17   Q9: Thoughts of better off dead/self-harm past 2 weeks Several days More than half the days More than half the days   F/U: Thoughts of suicide or self-harm No - Yes   F/U: Self harm-plan - - No   F/U: Self-harm action - - No   F/U: Safety concerns No - No     JEFF-7 SCORE 3/31/2022 4/4/2022 4/4/2022   Total Score - - -   Total Score - - 11 (moderate anxiety)   Total Score 9 11 11       Session Content:     - \"not good\", got a call from his brother that mom was found down for a few days and had elevated troponins, back fractures, etc. Found out yesterday. Reports he was very shaky all day after the call, \"wanted to run away\"  - As far as he knows she is still conscious.   - Visiting her might be difficult due to Covid restrictions  - Reports he \"knows\" his mom would not want to see him, his sister and nephew could ask her but he says he doesn't talk to her either.   - Will have his brother ask mom if Les can come visit  - Felt wife and daughter did not know what to say as they don't like his mom. Clarified his thoughts around this.     - Had intake DA for IOP/PHP, reports he was told his new insurance would not cover these programs    - Before finding out about mom things had been \"the same\", says was having a \"decent weekend\" with wife and daughter, was kind of fun, washed dishes, watched movies, went for a walk  - Continuing to have some aches and pains physically. Using medicinal marijuana. Actually felt good recently, alma after activity of washing dishes with family. Pain was still there but wasn't taking up as much space in his head.   - Discussed aspects of chronic pain, including how positive experiences change how pain is experienced    - Will have our social work team call to " "check in in the next week or so about insurance issues    Treatment Plan:   1. Depression- be more active (doing things around the house), more motivation, confidence in self, go back to school (degree in psychology)    2. Relationship with bio parents- have a conversation with mom without fighting, give her a hug. Have deeper conversations with bio dad, feel more like a son.     3. Feelings of worthlessness, guilt, self-blame- Less shame, more future oriented     Mental Status Exam:  Alertness: alert  and oriented  Appearance: well groomed  Behavior/Demeanor: cooperative, pleasant and calm, with good  eye contact   Speech: normal  Language: intact. Preferred language identified as English.  Psychomotor: normal or unremarkable  Mood: depressed and anxious  Affect: blunted and appropriate; was congruent to mood; was congruent to content  Thought Process/Associations: unremarkable  Thought Content:  Reports none;  Denies suicidal and violent ideation and delusions  -Suicidal ideation: denies SI, denies intent, and denies plan  -Homicidal Ideation: denies  Perception: Reports none;  Denies auditory hallucinations and visual hallucinations; intact    Insight: good  Judgment: good  Cognition: does  appear grossly intact    Billing for \"Interactive Complexity\"?    No    Franklin Breen MD  Answers for HPI/ROS submitted by the patient on 4/4/2022  If you checked off any problems, how difficult have these problems made it for you to do your work, take care of things at home, or get along with other people?: Extremely difficult  PHQ9 TOTAL SCORE: 17  JEFF 7 TOTAL SCORE: 11      "

## 2022-04-05 ENCOUNTER — MYC MEDICAL ADVICE (OUTPATIENT)
Dept: PALLIATIVE MEDICINE | Facility: CLINIC | Age: 57
End: 2022-04-05
Payer: COMMERCIAL

## 2022-04-05 DIAGNOSIS — M51.369 DDD (DEGENERATIVE DISC DISEASE), LUMBAR: ICD-10-CM

## 2022-04-05 DIAGNOSIS — M54.41 CHRONIC BILATERAL LOW BACK PAIN WITH RIGHT-SIDED SCIATICA: ICD-10-CM

## 2022-04-05 DIAGNOSIS — M05.79 RHEUMATOID ARTHRITIS INVOLVING MULTIPLE SITES WITH POSITIVE RHEUMATOID FACTOR (H): ICD-10-CM

## 2022-04-05 DIAGNOSIS — M25.551 HIP PAIN, RIGHT: ICD-10-CM

## 2022-04-05 DIAGNOSIS — M54.59 LUMBAR FACET JOINT PAIN: ICD-10-CM

## 2022-04-05 DIAGNOSIS — M45.7 ANKYLOSING SPONDYLITIS OF LUMBOSACRAL REGION (H): ICD-10-CM

## 2022-04-05 DIAGNOSIS — F11.20 UNCOMPLICATED OPIOID DEPENDENCE (H): ICD-10-CM

## 2022-04-05 DIAGNOSIS — G89.29 CHRONIC BILATERAL LOW BACK PAIN WITH RIGHT-SIDED SCIATICA: ICD-10-CM

## 2022-04-05 ASSESSMENT — PATIENT HEALTH QUESTIONNAIRE - PHQ9: SUM OF ALL RESPONSES TO PHQ QUESTIONS 1-9: 17

## 2022-04-05 ASSESSMENT — ANXIETY QUESTIONNAIRES: GAD7 TOTAL SCORE: 11

## 2022-04-08 RX ORDER — BUPRENORPHINE AND NALOXONE 8; 2 MG/1; MG/1
0.5 FILM, SOLUBLE BUCCAL; SUBLINGUAL 2 TIMES DAILY
Qty: 30 EACH | Refills: 0 | Status: SHIPPED | OUTPATIENT
Start: 2022-04-08 | End: 2022-05-06

## 2022-04-08 NOTE — TELEPHONE ENCOUNTER
Received call from patient requesting refill(s) of Buprinorphine     Last dispensed from pharmacy on 3/5    Patient's last office/virtual visit by prescribing provider on 2/4  Next office/virtual appointment scheduled for none    Last urine drug screen date 6/15/21  Current opioid agreement on file (completed within the last year) No Date of opioid agreement: 1/21/20    E-prescribe to [facility] pharmacy    Will route to nursing pool for review and preparation of prescription(s).     Medication refill information reviewed.     Due date for Buprinophine is now     Prescriptions prepped for review.     Will route to provider.     Josias Lopez RN  Patient Care Supervisor   [facility]

## 2022-04-08 NOTE — TELEPHONE ENCOUNTER
Signed Prescriptions:                        Disp   Refills    buprenorphine HCl-naloxone HCl (SUBOXONE) *30 each0        Sig: Place 0.5 Film under the tongue 2 times daily Max 1           film per day. Fill 4/8/22 and start 4/8/22 to           last 30 days for chronic pain  Authorizing Provider: SUSANA PETTY    Reviewed [place]  April 8, 2022- no concerning fills.  Patient needs to report any opiates he receives outside of my scripts. He has gotten 2 small opiate scripts in the interim.   Susana Petty APRN, RN CNP, FNP  [facility]  [facility]

## 2022-04-20 ENCOUNTER — TELEPHONE (OUTPATIENT)
Dept: PSYCHIATRY | Facility: CLINIC | Age: 57
End: 2022-04-20
Payer: COMMERCIAL

## 2022-04-20 NOTE — TELEPHONE ENCOUNTER
SW placed call to Les to discuss possible resources.    He recently received Social Security Disability and now has medicare. He chose a plan that only allows him to see [facility] providers, but notes he would have to pay significantly more for providers outside of this system. He was told insurance would not pay for day treatment. Medicare insurance through [employer].    Writer provided empathic listening as Jamison shared frustrations with limited benefits he gets from social security, noting it is about the same as one previous paycheck. He may have to declare bankruptcy due to medical debt and is concerned about incurring more.    Writer provided information on Bridge to Benefits and [employer] resources, which may allow him ways to get further benefits or help pay for medical bills. Writer will also My Chart information on free [facility] support groups while we are looking at other options. Writer will follow up in 1 week after further checking into insurance issues.    Writer also checked on current safety. Jamison denies current suicidal thoughts. He does endorse on-going depression, noting he feels worse than at most recent visit. Pt has appointment with Dr. Breen on 4/25/22.    OPAL Meyer, [facility]  [phone]        ----- Message from Franklin Breen MD sent at 4/15/2022 10:28 AM CDT -----  Regarding: Check in and insurance question  Hi social work team,    This is a patient I am seeing for therapy. A few visits ago he seemed markedly more depressed, and as he was just granted disability and is now on medicare, we thought an IOP or PHP would be a good option since he has the time. He actually had [facility] at [facility] but was reportedly told his insurance would not cover PHP or IOP through [facility].     Im wondering if someone from the social work team can do a brief check in sometime in the next few days to both do a brief safety screen and discuss insurance options for other day  programs in the area.     Let me know if you have other questions. Thanks so much.    Franklin

## 2022-04-28 ENCOUNTER — MYC MEDICAL ADVICE (OUTPATIENT)
Dept: PSYCHIATRY | Facility: CLINIC | Age: 57
End: 2022-04-28
Payer: COMMERCIAL

## 2022-04-28 NOTE — TELEPHONE ENCOUNTER
Called patient to follow up on his [employer] message and to offer an appointment for May 11 at 1 pm. He accepted the appointment.     He has not been sleeping much, even with quetiapine. He falls asleep during the day at times, but even these do not last long. He is feeling very depressed and does not know how to cope. He is not handling the situation with his Mom well. She has been in the hospital and is not doing well.  She has had a heart attack and is on dialysis, and two days ago had a stroke. She has refused to see him or talk to him, and this is very difficult for him. They used to be somewhat close up until a year ago, and then she refused to talk to him.     He denied safety concerns at this time. He has crisis numbers and will reach out if symptoms worsen.    Routed to Dr. Sage for FYI.

## 2022-04-28 NOTE — TELEPHONE ENCOUNTER
Thank you so much for letting me know Mechelle. I'm also forwarding this to Franklin who is doing therapy with Jamison as an FYI.  Slim

## 2022-05-02 ENCOUNTER — VIRTUAL VISIT (OUTPATIENT)
Dept: PSYCHIATRY | Facility: CLINIC | Age: 57
End: 2022-05-02
Attending: PHYSICAL MEDICINE & REHABILITATION
Payer: COMMERCIAL

## 2022-05-02 ENCOUNTER — HOSPITAL ENCOUNTER (EMERGENCY)
Facility: CLINIC | Age: 57
Discharge: HOME | End: 2022-05-02
Attending: EMERGENCY MEDICINE | Admitting: EMERGENCY MEDICINE
Payer: COMMERCIAL

## 2022-05-02 VITALS
TEMPERATURE: 98.3 F | DIASTOLIC BLOOD PRESSURE: 90 MMHG | HEIGHT: 76 IN | WEIGHT: 270.5 LBS | RESPIRATION RATE: 18 BRPM | BODY MASS INDEX: 32.94 KG/M2 | HEART RATE: 72 BPM | OXYGEN SATURATION: 96 % | SYSTOLIC BLOOD PRESSURE: 163 MMHG

## 2022-05-02 DIAGNOSIS — F33.9 RECURRENT MAJOR DEPRESSIVE DISORDER, REMISSION STATUS UNSPECIFIED (H): ICD-10-CM

## 2022-05-02 DIAGNOSIS — F11.20 OPIOID DEPENDENCE ON MAINTENANCE AGONIST THERAPY, NO SYMPTOMS (H): ICD-10-CM

## 2022-05-02 DIAGNOSIS — G89.29 OTHER CHRONIC PAIN: ICD-10-CM

## 2022-05-02 DIAGNOSIS — F19.90 SUBSTANCE USE DISORDER: ICD-10-CM

## 2022-05-02 DIAGNOSIS — F33.2 MAJOR DEPRESSIVE DISORDER, RECURRENT EPISODE, SEVERE WITH MIXED FEATURES (H): Primary | ICD-10-CM

## 2022-05-02 LAB
SARS-COV-2 RNA RESP QL NAA+PROBE: NEGATIVE
TSH SERPL DL<=0.005 MIU/L-ACNC: 1.46 MU/L (ref 0.4–4)

## 2022-05-02 PROCEDURE — 250N000013 HC RX MED GY IP 250 OP 250 PS 637: Performed by: PSYCHIATRY & NEUROLOGY

## 2022-05-02 PROCEDURE — 99285 EMERGENCY DEPT VISIT HI MDM: CPT | Mod: 25

## 2022-05-02 PROCEDURE — C9803 HOPD COVID-19 SPEC COLLECT: HCPCS

## 2022-05-02 PROCEDURE — 99204 OFFICE O/P NEW MOD 45 MIN: CPT | Performed by: NURSE PRACTITIONER

## 2022-05-02 PROCEDURE — 36415 COLL VENOUS BLD VENIPUNCTURE: CPT | Performed by: NURSE PRACTITIONER

## 2022-05-02 PROCEDURE — 84443 ASSAY THYROID STIM HORMONE: CPT | Performed by: NURSE PRACTITIONER

## 2022-05-02 PROCEDURE — U0003 INFECTIOUS AGENT DETECTION BY NUCLEIC ACID (DNA OR RNA); SEVERE ACUTE RESPIRATORY SYNDROME CORONAVIRUS 2 (SARS-COV-2) (CORONAVIRUS DISEASE [COVID-19]), AMPLIFIED PROBE TECHNIQUE, MAKING USE OF HIGH THROUGHPUT TECHNOLOGIES AS DESCRIBED BY CMS-2020-01-R: HCPCS | Performed by: EMERGENCY MEDICINE

## 2022-05-02 PROCEDURE — 90791 PSYCH DIAGNOSTIC EVALUATION: CPT

## 2022-05-02 PROCEDURE — 90832 PSYTX W PT 30 MINUTES: CPT | Mod: GT | Performed by: STUDENT IN AN ORGANIZED HEALTH CARE EDUCATION/TRAINING PROGRAM

## 2022-05-02 RX ORDER — PRAZOSIN HYDROCHLORIDE 1 MG/1
1 CAPSULE ORAL AT BEDTIME
Qty: 30 CAPSULE | Refills: 0 | Status: SHIPPED | OUTPATIENT
Start: 2022-05-02 | End: 2022-05-11

## 2022-05-02 RX ADMIN — NICOTINE POLACRILEX 4 MG: 4 GUM, CHEWING BUCCAL at 16:34

## 2022-05-02 RX ADMIN — NICOTINE POLACRILEX 4 MG: 4 GUM, CHEWING BUCCAL at 21:22

## 2022-05-02 RX ADMIN — NICOTINE POLACRILEX 4 MG: 4 GUM, CHEWING BUCCAL at 17:53

## 2022-05-02 RX ADMIN — NICOTINE POLACRILEX 4 MG: 4 GUM, CHEWING BUCCAL at 19:48

## 2022-05-02 RX ADMIN — NICOTINE POLACRILEX 4 MG: 4 GUM, CHEWING BUCCAL at 14:54

## 2022-05-02 ASSESSMENT — ENCOUNTER SYMPTOMS
HALLUCINATIONS: 0
SORE THROAT: 0
SHORTNESS OF BREATH: 0
DYSURIA: 0
DYSPHORIC MOOD: 1
COUGH: 0
DIARRHEA: 0
VOMITING: 0
NAUSEA: 0
ABDOMINAL PAIN: 0
FEVER: 0

## 2022-05-02 NOTE — ED TRIAGE NOTES
Pt reports meeting with his psychiatrist. Pt states that he was told to come check in. Pt notes loss of self worth. Pt deals with chroinic pain. Pt states his mom is dying in the hospital and doesn't want to see him. Pt denies SI and HI.      Triage Assessment     Row Name 05/02/22 1229       Triage Assessment (Adult)    Airway WDL WDL       Respiratory WDL    Respiratory WDL WDL       Skin Circulation/Temperature WDL    Skin Circulation/Temperature WDL WDL       Cardiac WDL    Cardiac WDL WDL       Peripheral/Neurovascular WDL    Peripheral Neurovascular WDL WDL       Cognitive/Neuro/Behavioral WDL    Cognitive/Neuro/Behavioral WDL WDL
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
## 2022-05-02 NOTE — ED NOTES
"56 year old male received to [facility] in a depressed and anxious state. Reports that depression \"rough\" the past few weeks. Feels \"worthless\" . On disability for medical issues. Dealing with chronic pain. Struggling with conflict between him and his mother for the past year but increasingly stressful now as mother is in the hospital for serious medical issues/near death. Mother refusing to talk with patient or allow him to visit. Pt. feeling a lot of shame, guilt and anger. Reports that he has thoughts of going to sleep and wishing he didn't wake up but denies active suicide thoughts.     Nursing assessment complete including patient and medication profiles. Risk assessments completed addressing suicide,fall,skin,nutrition and safety issues. Care plan initiated. Assessments reviewed with physician and admit orders received. Video monitoring in progress, Patient Informed.     "

## 2022-05-02 NOTE — ED PROVIDER NOTES
[facility] Unit - Psychiatric Consultation  [facility] Emergency Department    Jailene Breen MRN: [id number]   Age: 56 year old YOB: 1965     History     Chief Complaint   Patient presents with     Psychiatric Evaluation     HPI  Jailene Breen is a 56 year old male with history notable for substance use disorder, most notably opioids, currently on suboxone therapy, major depressive disorder and chronic pain who presents to the ED at the advise of his psychotherapist for worsening depressive symptoms and passive suicidal ideation in the context of heightened stressors.  Patient was evaluated by the ED provider, who medically cleared patient to transfer to [facility] for psychiatric assessment, this is reviewed along with all pertinent labs and tests performed.    On examination, patient describes a long-standing conflictual relationship with his mother.  His mother recently sustained a fall and is in the hospital critically ill. She is conscious and is telling the patient she does not want him involved in her life despite him hoping to reconcile their relationship given the nature of her physical health.  He is receiving mental health support through therapy and psychiatry. He indicates his Prozac was recently increased to target increased depressive symptoms, he's unsure if he has noted a benefit or not given external stressors. He voices concern about sleep, reporting nightmares. He inquires about his thyroid medication, wondering if he is experiencing side effects from this. He denies any active suicidal thoughts. Identifies current life stressors as his main issue. States he is seeking additional outpatient support stating he is working closely with a psychiatrist regarding medication management.    Past Medical History  Past Medical History:   Diagnosis Date     Ankylosing spondylitis (H) 03/01/2017     Anxiety      Arthritis     back, knees     Back pain 11/17/2013    possible drug seeking behavior  "in the past.     Gastroesophageal reflux disease      Hypertension      Overweight (BMI 25.0-29.9) 03/06/2012     Panic attacks      Syncope, unspecified syncope type 02/27/2017     Past Surgical History:   Procedure Laterality Date     ARTHROSCOPY KNEE Right 09/22/2016    Procedure: ARTHROSCOPY KNEE;  Surgeon: Fredo Hughes MD;  Location: [facility] OR     COMBINED ESOPHAGOSCOPY, GASTROSCOPY, DUODENOSCOPY (EGD) WITH CO2 INSUFFLATION N/A 01/19/2021    Procedure: ESOPHAGOGASTRODUODENOSCOPY, WITH CO2 INSUFFLATION;  Surgeon: Brandon Mack MD;  Location: [facility] OR     ESOPHAGOSCOPY, GASTROSCOPY, DUODENOSCOPY (EGD), COMBINED N/A 01/19/2021    Procedure: Esophagogastroduodenoscopy, With Biopsy;  Surgeon: Brandon Mack MD;  Location: [facility] OR     ESOPHAGOSCOPY, GASTROSCOPY, DUODENOSCOPY (EGD), COMBINED N/A 05/27/2021    Procedure: ESOPHAGOGASTRODUODENOSCOPY (EGD);  Surgeon: Chester Lynn MD;  Location: [facility]     INJECT PARAVERTEBRAL FACET JOINT LUMBAR / SACRAL FIRST Right 11/25/2016    Procedure: INJECT PARAVERTEBRAL FACET JOINT LUMBAR / SACRAL FIRST;  Surgeon: Doretha Magallanes MD;  Location: [facility] OR     ORTHOPEDIC SURGERY Right     knee     PLANTAR FASCIA RELEASE Left      acetaminophen (TYLENOL) 500 MG tablet  buprenorphine HCl-naloxone HCl (SUBOXONE) 8-2 MG per film  etanercept (ENBREL SURECLICK) 50 MG/ML autoinjector  FLUoxetine (PROZAC) 20 MG capsule  folic acid (FOLVITE) 1 MG tablet  liothyronine (CYTOMEL) 25 MCG tablet  medical cannabis (Patient's own supply)  methotrexate sodium 2.5 MG TABS  nicotine polacrilex (NICORETTE) 4 MG gum  ondansetron (ZOFRAN-ODT) 4 MG ODT tab  QUEtiapine (SEROQUEL) 50 MG tablet  vitamin D2 (ERGOCALCIFEROL) 32294 units (1250 mcg) capsule      Allergies   Allergen Reactions     Mirtazapine      Other reaction(s): Coma     Hydrocodone Itching     Ms Contin [Morphine] Itching     Remeron Soltab Other (See Comments)     \"Blacked out\" for one week     Family " "History  Family History   Problem Relation Age of Onset     Hypertension Mother      Diabetes Mother      Depression Mother      Mental Illness Mother      Coronary Stenting Father 62        possible MI     Lung Cancer Paternal Uncle      Substance Abuse Brother      Substance Abuse Brother      Autoimmune Disease No family hx of      Anesthesia Reaction No family hx of      Thrombophilia No family hx of      Bleeding Disorder No family hx of      Social History   Social History     Tobacco Use     Smoking status: Never Smoker     Smokeless tobacco: Former User     Types: Chew     Quit date: 12/12/2019     Tobacco comment: still chews nicotine gum   Substance Use Topics     Alcohol use: No     Comment: none     Drug use: Yes     Frequency: 7.0 times per week     Types: Marijuana     Comment: medicinal (oral)      Past medical history, past surgical history, medications, allergies, family history, and social history were reviewed with the patient. No additional pertinent items.       Review of Systems  A complete review of systems was performed with pertinent positives and negatives noted in the HPI, and all other systems negative.    Physical Examination   BP: (!) 177/93  Pulse: 73  Temp: 98.3  F (36.8  C)  Resp: 18  Height: 193 cm (6' 4\")  Weight: 122.5 kg (270 lb)  SpO2: 97 %    Physical Exam  General: Appears stated age.   Neuro: Alert and fully oriented. Extremities appear to demonstrate normal strength on visual inspection.   Integumentary/Skin: no rash visualized, normal color    Psychiatric Examination   Appearance: awake, alert  Attitude:  cooperative  Eye Contact:  good  Mood:  better  Affect:  appropriate and in normal range  Speech:  clear, coherent  Psychomotor Behavior:  no evidence of tardive dyskinesia, dystonia, or tics  Thought Process:  logical, linear and goal oriented  Associations:  no loose associations  Thought Content:  no evidence of suicidal ideation or homicidal ideation and no evidence of " psychotic thought  Insight:  good  Judgement:  intact  Oriented to:  time, person, and place  Attention Span and Concentration:  intact  Recent and Remote Memory:  intact  Language: able to name/identify objects without impairment  Fund of Knowledge: intact with awareness of current and past events    ED Course        Labs Ordered and Resulted from Time of ED Arrival to Time of ED Departure   COVID-19 VIRUS (CORONAVIRUS) BY PCR - Normal       Result Value    SARS CoV2 PCR Negative         Assessments & Plan (with Medical Decision Making)   Patient presenting with worsening depressive symptoms in the context of chronic mental illness further complicated by relationship conflict between he and his mother. He does not feel actively suicidal. He is compliant with psychotropic medications and tolerating without issues. He does feel sleep could be improved which would benefit his overall functioning. He also appears concerned and worried about his thyroid, thus will order TSH for his outpatient provider to follow up with.    The Prescription Drug Monitoring Program (PDMP) database was accessed to verify accuracy of patients prescribed controlled substances.    Nursing notes reviewed noting no acute issues.     I have reviewed the assessment completed by the [facility].     After a period of working with the treatment team on the [facility] unit, the patient's mental state improved to allow a safe transition to outpatient care. After counseling on the diagnosis, work-up, and treatment plan, the patient was discharged. Close follow-up with a psychiatrist and/or therapist was recommended and community psychiatric resources were provided. Patient is to return to the ED if any urgent or potentially life-threatening concerns.     At the time of discharge, the patient's acute suicide risk was determined to be low due to the following factors: Reduction in the intensity of mood/anxiety symptoms that preceded the admission, denial of  suicidal thoughts, denies feeling helpless or helpless, not currently under the influence of alcohol or illicit substances, denies experiencing command hallucinations, no immediate access to firearms. The patient's acute risk could be higher if noncompliant with their treatment plan, medications, follow-up appointments or using illicit substances or alcohol. Protective factors include: social supports, children, stable housing.    Preliminary diagnosis:    ICD-10-CM    1. Substance use disorder  F19.90    2. Opioid dependence on maintenance agonist therapy, no symptoms (H)  F11.20    3. Other chronic pain  G89.29    4. Recurrent major depressive disorder, remission status unspecified (H)  F33.9         Treatment Plan:  -Discharge home with safety plan in place.  -Continue Prozac 60 mg daily, which was increased about 2 weeks ago. He is tolerating it without adverse side effects. Having difficulty gaging theraputic response at this point given worsening external stressors. Will continue dose as is.  -Minipress 1mg nightly to target nightmares.  -Medication education provided this visit includes, rationale for medication, importance of compliance, medication interactions, and common side effects. Patient agreeable.  -Problem focused, supportive therapy provided around patients stressors and symptoms related to their diagnosis.  Validated patients emotions and problems solved with patient around stress management and self care strategies. Patient was receptive.   -Referral to IOP  -Will order TSH for thyroid functioning.  -Follow up with outpatient psychiatrist for medication management and TSH level.  --  JUANITA Brwoer CNP   [facility] EMERGENCY DEPT  [facility] Unit  5/2/2022      Shreya Dillon APRN CNP  05/02/22 [id number]

## 2022-05-02 NOTE — ED PROVIDER NOTES
History   Chief Complaint:  Psychiatric Evaluation     The history is provided by the patient and medical records.      Jailene Breen is a 56 year old male with history of ankylosing spondylitis, rheumatoid arthritis, hypertension, anxiety, depression, and substance abuse who presents for psychiatric abuse. The patient states he has had worsening dysphoric moods for the past few weeks. He has history of anxiety and depression and is currently on daily medication for this. He states his Prozac dosage was increased a few weeks ago, however depression has not improved. He notes he has not spoken to his mother for the past year, which has worsened his mental health symptoms. His mother is currently in the hospital for concerning health problems, however the mother does not want the patient to visit or speak to her. He denies suicidal ideation or homicidal ideation. He denies auditory or visual hallucinations. He denies fevers, chest pain, shortness of breath, abdominal pain, nausea, vomting, diarrhea, dysuria, cough, or sore throat. He denies recent COVID diagnosis. He denies alcohol or illicit drug use other than medical marijuana.     Review of Systems   Constitutional: Negative for fever.   HENT: Negative for sore throat.    Respiratory: Negative for cough and shortness of breath.    Cardiovascular: Negative for chest pain.   Gastrointestinal: Negative for abdominal pain, diarrhea, nausea and vomiting.   Genitourinary: Negative for dysuria.   Psychiatric/Behavioral: Positive for dysphoric mood. Negative for hallucinations and suicidal ideas.   All other systems reviewed and are negative.    Allergies:  Mirtazapine  Hydrocodone  Morphine  Remeron Soltab    Medications:  Suboxone  Prozac  Cytomel  Medical cannabis   Methotrexate   Zofran  Seroquel     Past Medical History:     Ankylosing spondylitis  Anxiety   Arthritis   GERD  Hypertension   Panic attacks   Obesity   Rheumatoid arthritis   Depression  Suicidal  "ideation   Drug dependency   Hyperlipidemia  Narcotic abuse   Opioid dependence   Lumbar radiculopathy       Past Surgical History:    Knee arthroscopy, right  Esophagogastroduodenoscopy with CO2 insufflation   Paravertebral facet joint lumbar/sacral injection  Fascia plantar release, left     Family History:    Mother - Hypertension, Diabetes Mellitus, Depression  Father - Coronary Stenting   Brother - Substance Abuse     Social History:  The patient was unaccompanied to the emergency department.    Physical Exam     Patient Vitals for the past 24 hrs:   BP Temp Temp src Pulse Resp SpO2 Height Weight   05/02/22 1226 (!) 177/93 98.3  F (36.8  C) Temporal 73 18 97 % 1.93 m (6' 4\") 122.5 kg (270 lb)     Physical Exam  General: Alert, appears well-developed and well-nourished. Cooperative.     In mild distress  HEENT:  Head:  Atraumatic  Ears:  External ears are normal  Mouth/Throat:  Oropharynx is without erythema or exudate and mucous membranes are moist.   Eyes:   Conjunctivae normal and EOM are normal. No scleral icterus.  CV:  Normal rate, regular rhythm, normal heart sounds and radial pulses are 2+ and symmetric.  No murmur.  Resp:  Breath sounds are clear bilaterally    Non-labored, no retractions or accessory muscle use  GI:  Abdomen is soft, no distension, no tenderness. No rebound or guarding.  No CVA tenderness bilaterally  MS:  Normal range of motion. No edema.    Normal strength in all 4 extremities.     Back atraumatic.    No midline cervical, thoracic, or lumbar tenderness  Skin:  Warm and dry.  No rash or lesions noted.  Neuro: Alert. Normal strength.  GCS: 15  Psych:  Depressed mood and flat affect.  Denies SI/HI.  Denies hallucinations.     Emergency Department Course   Laboratory:  Labs Ordered and Resulted from Time of ED Arrival to Time of ED Departure   COVID-19 VIRUS (CORONAVIRUS) BY PCR - Normal       Result Value    SARS CoV2 PCR Negative        Emergency Department Course:    Mental Health " Risk Assessment      PSS-3    Date and Time Over the past 2 weeks have you felt down, depressed, or hopeless? Over the past 2 weeks have you had thoughts of killing yourself? Have you ever attempted to kill yourself? When did this last happen? User   05/02/22 1230 yes no yes more than 6 months ago SR              Suicide assessment completed by mental health (D.E.C., LCSW, etc.)    Reviewed:  I reviewed nursing notes, vitals, past medical history and Care Everywhere    Assessments:  1246 I obtained history and examined the patient as noted above.     Disposition:  The patient was transferred to [facility].     Impression & Plan   Medical Decision Making:  Jailene Breen is a 56 year old male who presents for evaluation of depressed mood.  There is a history of depression in the past and they are on medications. There is no signs at this point of a general medical problem causing depression.      Patient is medically cleared at this point.  COVID swab negative.  Patient will be transferred to [facility] for definitive psychiatric management and further evaluation of ongoing depression    Diagnosis:    ICD-10-CM    1. Depression, unspecified depression type  F32.A      Scribe Disclosure:  I, Coleen Price, am serving as a scribe at 12:37 PM on 5/2/2022 to document services personally performed by Josias Vargas MD based on my observations and the provider's statements to me.     Josias Vargas MD  05/02/22 [id number]

## 2022-05-03 NOTE — PROGRESS NOTES
Patient agreeable to discharge plan. Discharge instructions reviewed with patient including follow-up care plan. Medications: prazosin prescribed to patient's preferred outpatient pharmacy. Reviewed safety plan and outpatient resources. Denies SI and HI. All belongings that were brought into the hospital have been returned to patient. Escorted off the unit at 2135 accompanied by [facility] staff. Discharged to home via ride from wife.

## 2022-05-03 NOTE — DISCHARGE INSTRUCTIONS
"Aftercare Plan  If I am feeling unsafe or I am in a crisis, I will:   Contact my established care providers   Call the National Suicide Prevention Lifeline: 850.423.9909   Go to the nearest emergency room   Call 911     Warning signs that I or other people might notice when a crisis is developing for me: I start to isolate, not as talkative, lack of sleep.    Things I am able to do on my own to cope or help me feel better: Go for a walk with the dogs.     Things that I am able to do with others to cope or help me feel better: Have a movie day with my family, eat some snacks.     Things I can use or do for distraction: Watch movies, spend time with dogs.    Changes I can make to support my mental health and wellness: Attend [facility] through [employer].     People in my life that I can ask for help: My wife, children, and .      Your [facility] has a mental health crisis team you can call 24/7: [facility]  [phone]    Other things that are important when I m in crisis: Talk to me, be supportive, if they have ideas I can be open to their opinions and thoughts.     Appointment information and/or additional resources available to me:     5/10/2022 10:00 am  [facility]   [phone]         Crisis Lines  Crisis Text Line  Text 291027  You will be connected with a trained live crisis counselor to provide support.    Por espanol, texto  GERA a 912573 o texto a 442-AYUDAME en WhatsA    National Hope Line  1.800.SUICIDE [1954663]      Community Resources  Fast Tracker  Linking people to mental health and substance use disorder resources  fasttrackermn.org     [place] Mental Health Warm Line  Peer to peer support  Monday thru Saturday, 12 pm to 10 pm  918.660.7772 or 1.250.164.4513  Text \"Support\" to 44481    National [place] on Mental Illness (REYNOLD)  481.382.1320 or 1.888.REYNOLD.HELPS        Mental Health Apps  My3  https://my3app.org/    VirtualHopeBox  " https://POLYBONA/apps/virtual-hope-box/      Additional Information  Today you were seen by a licensed mental health professional through Triage and Transition services, [facility] (P)  for a crisis assessment in the Emergency Department at [facility].  It is recommended that you follow up with your established providers (psychiatrist, mental health therapist, and/or primary care doctor - as relevant) as soon as possible. Coordinators from [facility] will be calling you in the next 24-48 hours to ensure that you have the resources you need.  You can also contact [facility] coordinators directly at [phone]. You may have been scheduled for or offered an appointment with a mental health provider. [facility] maintains an extensive network of licensed behavioral health providers to connect patients with the services they need.  We do not charge providers a fee to participate in our referral network.  We match patients with providers based on a patient's specific needs, insurance coverage, and location.  Our first effort will be to refer you to a provider within your care system, and will utilize providers outside your care system as needed.

## 2022-05-05 ENCOUNTER — MYC MEDICAL ADVICE (OUTPATIENT)
Dept: PALLIATIVE MEDICINE | Facility: CLINIC | Age: 57
End: 2022-05-05
Payer: COMMERCIAL

## 2022-05-05 DIAGNOSIS — F11.20 UNCOMPLICATED OPIOID DEPENDENCE (H): ICD-10-CM

## 2022-05-05 DIAGNOSIS — M25.551 HIP PAIN, RIGHT: ICD-10-CM

## 2022-05-05 DIAGNOSIS — M54.41 CHRONIC BILATERAL LOW BACK PAIN WITH RIGHT-SIDED SCIATICA: ICD-10-CM

## 2022-05-05 DIAGNOSIS — M45.7 ANKYLOSING SPONDYLITIS OF LUMBOSACRAL REGION (H): ICD-10-CM

## 2022-05-05 DIAGNOSIS — M05.79 RHEUMATOID ARTHRITIS INVOLVING MULTIPLE SITES WITH POSITIVE RHEUMATOID FACTOR (H): ICD-10-CM

## 2022-05-05 DIAGNOSIS — M51.369 DDD (DEGENERATIVE DISC DISEASE), LUMBAR: ICD-10-CM

## 2022-05-05 DIAGNOSIS — G89.29 CHRONIC BILATERAL LOW BACK PAIN WITH RIGHT-SIDED SCIATICA: ICD-10-CM

## 2022-05-05 DIAGNOSIS — M54.59 LUMBAR FACET JOINT PAIN: ICD-10-CM

## 2022-05-05 NOTE — TELEPHONE ENCOUNTER
To: PAIN NURSE      From: Jamison Breen      Created: 5/5/2022 12:55 PM        *-*-*This message was handled on 5/5/2022 1:22 PM by KATALINA RO*-*-*    Hi, I need a refill on my suboxone and sent to [facility] pharmacy in [place].  I have 3 days left and sorry I didn't give more notice as I was in the hospital and dealing with mental health issues still. Thank you, Jamison.        Routing refill request for buprenorphine HCl-naloxone HCl (SUBOXONE) 8-2 MG per film to team for processing to [facility] pharmacy    Katalina Kan RN, BSN, CMSRN  RN Care Coordinator  [facility]

## 2022-05-05 NOTE — LETTER
Opioid / Opioid Plus Controlled Substance Agreement    This is an agreement between you and your provider about the safe and appropriate use of controlled substance/opioids prescribed by your care team. Controlled substances are medicines that can cause physical and mental dependence (abuse).    There are strict laws about having and using these medicines. We here at [facility] are committing to working with you in your efforts to get better. To support you in this work, we ll help you schedule regular office appointments for medicine refills. If we must cancel or change your appointment for any reason, we ll make sure you have enough medicine to last until your next appointment.     As a Provider, I will:    Listen carefully to your concerns and treat you with respect.     Recommend a treatment plan that I believe is in your best interest. This plan may involve therapies other than opioid pain medication.     Talk with you often about the possible benefits, and the risk of harm of any medicine that we prescribe for you.     Provide a plan on how to taper (discontinue or go off) using this medicine if the decision is made to stop its use.    As a Patient, I understand that opioid(s):     Are a controlled substance prescribed by my care team to help me function or work and manage my condition(s).     Are strong medicines and can cause serious side effects such as:    Drowsiness, which can seriously affect my driving ability    A lower breathing rate, enough to cause death    Harm to my thinking ability     Depression     Abuse of and addiction to this medicine    Need to be taken exactly as prescribed. Combining opioids with certain medicines or chemicals (such as illegal drugs, sedatives, sleeping pills, and benzodiazepines) can be dangerous or even fatal. If I stop opioids suddenly, I may have severe withdrawal symptoms.    Do not work for all types of pain nor for all patients. If they re not helpful, I may  be asked to stop them.        The risks, benefits and side effects of these medicine(s) were explained to me. I agree that:  1. I will take part in other treatments as advised by my care team. This may be psychiatry or counseling, physical therapy, behavioral therapy, group treatment or a referral to a specialist.     2. I will keep all my appointments. I understand that this is part of the monitoring of opioids. My care team may require an office visit for EVERY opioid/controlled substance refill. If I miss appointments or don t follow instructions, my care team may stop my medicine.    3. I will take my medicines as prescribed. I will not change the dose or schedule unless my care team tells me to. There will be no refills if I run out early.     4. I may be asked to come to the clinic and complete a urine drug test or complete a pill count at any time. If I don t give a urine sample or participate in a pill count, the care team may stop my medicine.    5. I will only receive prescriptions from this clinic for chronic pain. If I am treated by another provider for acute pain issues, I will tell them that I am taking opioid pain medication for chronic pain and that I have a treatment agreement with this provider. I will inform my [facility] care team within one business day if I am given a prescription for any pain medication by another healthcare provider. My [facility] care team can contact other providers and pharmacists about my use of any medicines.    6. It is up to me to make sure that I don t run out of my medicines on weekends or holidays. If my care team is willing to refill my opioid prescription without a visit, I must request refills only during office hours. Refills may take up to 3 business days to process. I will use one pharmacy to fill all my opioid and other controlled substance prescriptions. I will notify the clinic about any changes to my insurance or medication  availability.    7. I am responsible for my prescriptions. If the medicine/prescription is lost, stolen or destroyed, it will not be replaced. I also agree not to share controlled substance medicines with anyone.    8. I am aware I should not use any illegal or recreational drugs. I agree not to drink alcohol unless my care team says I can.       9. If I enroll in the [place] Medical Cannabis program, I will tell my care team prior to my next refill.     10. I will tell my care team right away if I become pregnant, have a new medical problem treated outside of my regular clinic, or have a change in my medications.    11. I understand that this medicine can affect my thinking, judgment and reaction time. Alcohol and drugs affect the brain and body, which can affect the safety of my driving. Being under the influence of alcohol or drugs can affect my decision-making, behaviors, personal safety, and the safety of others. Driving while impaired (DWI) can occur if a person is driving, operating, or in physical control of a car, motorcycle, boat, snowmobile, ATV, motorbike, off-road vehicle, or any other motor vehicle ([place] Statute 169A.20). I understand the risk if I choose to drive or operate any vehicle or machinery.    I understand that if I do not follow any of the conditions above, my prescriptions or treatment may be stopped or changed.          Opioids  What You Need to Know    What are opioids?   Opioids are pain medicines that must be prescribed by a doctor. They are also known as narcotics.     Examples are:   1. morphine (MS Contin, Raven)  2. oxycodone (Oxycontin)  3. oxycodone and acetaminophen (Percocet)  4. hydrocodone and acetaminophen (Vicodin, Norco)   5. fentanyl patch (Duragesic)   6. hydromorphone (Dilaudid)   7. methadone  8. codeine (Tylenol #3)     What do opioids do well?   Opioids are best for severe short-term pain such as after a surgery or injury. They may work well for cancer pain. They may  help some people with long-lasting (chronic) pain.     What do opioids NOT do well?   Opioids never get rid of pain entirely, and they don t work well for most patients with chronic pain. Opioids don t reduce swelling, one of the causes of pain.                                    Other ways to manage chronic pain and improve function include:       Treat the health problem that may be causing pain    Anti-inflammation medicines, which reduce swelling and tenderness, such as ibuprofen (Advil, Motrin) or naproxen (Aleve)    Acetaminophen (Tylenol)    Antidepressants and anti-seizure medicines, especially for nerve pain    Topical treatments such as patches or creams    Injections or nerve blocks    Chiropractic or osteopathic treatment    Acupuncture, massage, deep breathing, meditation, visual imagery, aromatherapy    Use heat or ice at the pain site    Physical therapy     Exercise    Stop smoking    Take part in therapy       Risks and side effects     Talk to your doctor before you start or decide to keep taking opioids. Possible side effects include:      Lowering your breathing rate enough to cause death    Overdose, including death, especially if taking higher than prescribed doses    Worse depression symptoms; less pleasure in things you usually enjoy    Feeling tired or sluggish    Slower thoughts or cloudy thinking    Being more sensitive to pain over time; pain is harder to control    Trouble sleeping or restless sleep    Changes in hormone levels (for example, less testosterone)    Changes in sex drive or ability to have sex    Constipation    Unsafe driving    Itching and sweating    Dizziness    Nausea, throwing up and dry mouth    What else should I know about opioids?    Opioids may lead to dependence, tolerance, or addiction.      Dependence means that if you stop or reduce the medicine too quickly, you will have withdrawal symptoms. These include loose poop (diarrhea), jitters, flu-like symptoms,  nervousness and tremors. Dependence is not the same as addiction.                       Tolerance means needing higher doses over time to get the same effect. This may increase the chance of serious side effects.      Addiction is when people improperly use a substance that harms their body, their mind or their relations with others. Use of opiates can cause a relapse of addiction if you have a history of drug or alcohol abuse.      People who have used opioids for a long time may have a lower quality of life, worse depression, higher levels of pain and more visits to doctors.    You can overdose on opioids. Take these steps to lower your risk of overdose:    1. Recognize the signs:  Signs of overdose include decrease or loss of consciousness (blackout), slowed breathing, trouble waking up and blue lips. If someone is worried about overdose, they should call 911.    2. Talk to your doctor about Narcan (naloxone).   If you are at risk for overdose, you may be given a prescription for Narcan. This medicine very quickly reverses the effects of opioids.   If you overdose, a friend or family member can give you Narcan while waiting for the ambulance. They need to know the signs of overdose and how to give Narcan.     3. Don't use alcohol or street drugs.   Taking them with opioids can cause death.    4. Do not take any of these medicines unless your doctor says it s OK. Taking these with opioids can cause death:    Benzodiazepines, such as lorazepam (Ativan), alprazolam (Xanax) or diazepam (Valium)    Muscle relaxers, such as cyclobenzaprine (Flexeril)    Sleeping pills like zolpidem (Ambien)     Other opioids      How to keep you and other people safe while taking opioids:    1. Never share your opioids with others.  Opioid medicines are regulated by the Drug Enforcement Agency ([employer]EL). Selling or sharing medications is a criminal act.    2. Be sure to store opioids in a secure place, locked up if possible. Young children  can easily swallow them and overdose.    3. When you are traveling with your medicines, keep them in the original bottles. If you use a pill box, be sure you also carry a copy of your medicine list from your clinic or pharmacy.    4. Safe disposal of opioids    Most pharmacies have places to get rid of medicine, called disposal kiosks. Medicine disposal options are also available in every [facility]. Search your county and  medication disposal  to find more options. You can find more details at:  [email or web address]     I agree that my provider, clinic care team, and pharmacy may work with any city, state or federal law enforcement agency that investigates the misuse, sale, or other diversion of my controlled medicine. I will allow my provider to discuss my care with, or share a copy of, this agreement with any other treating provider, pharmacy or emergency room where I receive care.    I have read this agreement and have asked questions about anything I did not understand.    _______________________________________________________  Patient Signature - Jailene Breen _____________________                   Date     _______________________________________________________  Provider Signature - JUANITA Ga CNP   _____________________                   Date     _______________________________________________________  Witness Signature (required if provider not present while patient signing)   _____________________                   Date

## 2022-05-06 ENCOUNTER — TELEPHONE (OUTPATIENT)
Dept: BEHAVIORAL HEALTH | Facility: CLINIC | Age: 57
End: 2022-05-06
Payer: COMMERCIAL

## 2022-05-06 RX ORDER — BUPRENORPHINE AND NALOXONE 8; 2 MG/1; MG/1
0.5 FILM, SOLUBLE BUCCAL; SUBLINGUAL 2 TIMES DAILY
Qty: 30 EACH | Refills: 0 | Status: SHIPPED | OUTPATIENT
Start: 2022-05-06 | End: 2022-06-07

## 2022-05-06 NOTE — TELEPHONE ENCOUNTER
Signed Prescriptions:                        Disp   Refills    buprenorphine HCl-naloxone HCl (SUBOXONE) *30 each0        Sig: Place 0.5 Film under the tongue 2 times daily Max 1           film per day. Fill 5/6/22 and start 5/8/22 to           last 30 days for chronic pain  Authorizing Provider: SUSANA PETTY    Reviewed [place]  May 6, 2022- no concerning fills.    Susana GRANT RN CNP, XUAN  [facility]  [facility]      Hi Les-     I just signed for your Suboxone, you may fill it today and begin taking it on 5/8/2022.  Have a good weekend.      Susana GRANT RN CNP, XUAN  [facility]    I have sent the above [employer] message to the patient.     Susana GRANT RN CNP, XUAN  [facility]

## 2022-05-06 NOTE — TELEPHONE ENCOUNTER
Received call from patient requesting refill(s) of buprenorphine HCl-naloxone HCl (SUBOXONE) 8-2 MG per film    Hi, I need a refill on my suboxone and sent to [facility] in [place].  I have 3 days left and sorry I didn't give more notice as I was in the hospital and dealing with mental health issues still. Thank you, Les.    Last dispensed from pharmacy on 04/08/22    Patient's last office/virtual visit by prescribing provider on 02/04/22  Next office/virtual appointment scheduled for : none    Last urine drug screen date 06/15/21  Current opioid agreement on file (completed within the last year) No Date of opioid agreement: NA    E-prescribe to pharmacy-[id number] - [place], [place] - [address]     Will route to nursing [place] for review and preparation of prescription(s).

## 2022-05-06 NOTE — TELEPHONE ENCOUNTER
Called Les schedule f/u appointment 5/27/22  The opioid contract was reviewed and signed.  Pt was given a copy. The signed one was placed in bin to be scanned into epic.       Medication refill information reviewed.     Due date for buprenorphine HCl-naloxone HCl (SUBOXONE) 8-2 MG per film is 5/8/22     Prescriptions prepped for review.     Will route to provider.       Katalina Kan RN, BSN, CMSRN  RN Care Coordinator  [facility]

## 2022-05-09 ENCOUNTER — TELEPHONE (OUTPATIENT)
Dept: FAMILY MEDICINE | Facility: CLINIC | Age: 57
End: 2022-05-09
Payer: COMMERCIAL

## 2022-05-09 ENCOUNTER — VIRTUAL VISIT (OUTPATIENT)
Dept: PSYCHIATRY | Facility: CLINIC | Age: 57
End: 2022-05-09
Attending: PHYSICAL MEDICINE & REHABILITATION
Payer: COMMERCIAL

## 2022-05-09 DIAGNOSIS — F51.05 INSOMNIA DUE TO OTHER MENTAL DISORDER: ICD-10-CM

## 2022-05-09 DIAGNOSIS — F33.2 MAJOR DEPRESSIVE DISORDER, RECURRENT EPISODE, SEVERE WITH MIXED FEATURES (H): Primary | ICD-10-CM

## 2022-05-09 DIAGNOSIS — F99 INSOMNIA DUE TO OTHER MENTAL DISORDER: ICD-10-CM

## 2022-05-09 DIAGNOSIS — F41.1 GENERALIZED ANXIETY DISORDER: ICD-10-CM

## 2022-05-09 PROCEDURE — 99207 PR SERVICE NOT STAFFED W/SUPERV PROV: CPT | Mod: 95 | Performed by: STUDENT IN AN ORGANIZED HEALTH CARE EDUCATION/TRAINING PROGRAM

## 2022-05-09 ASSESSMENT — ANXIETY QUESTIONNAIRES
7. FEELING AFRAID AS IF SOMETHING AWFUL MIGHT HAPPEN: NOT AT ALL
6. BECOMING EASILY ANNOYED OR IRRITABLE: MORE THAN HALF THE DAYS
GAD7 TOTAL SCORE: 12
GAD7 TOTAL SCORE: 12
6. BECOMING EASILY ANNOYED OR IRRITABLE: MORE THAN HALF THE DAYS
2. NOT BEING ABLE TO STOP OR CONTROL WORRYING: MORE THAN HALF THE DAYS
GAD7 TOTAL SCORE: 12
GAD7 TOTAL SCORE: 12
5. BEING SO RESTLESS THAT IT IS HARD TO SIT STILL: MORE THAN HALF THE DAYS
2. NOT BEING ABLE TO STOP OR CONTROL WORRYING: MORE THAN HALF THE DAYS
8. IF YOU CHECKED OFF ANY PROBLEMS, HOW DIFFICULT HAVE THESE MADE IT FOR YOU TO DO YOUR WORK, TAKE CARE OF THINGS AT HOME, OR GET ALONG WITH OTHER PEOPLE?: VERY DIFFICULT
GAD7 TOTAL SCORE: 12
5. BEING SO RESTLESS THAT IT IS HARD TO SIT STILL: MORE THAN HALF THE DAYS
7. FEELING AFRAID AS IF SOMETHING AWFUL MIGHT HAPPEN: NOT AT ALL
1. FEELING NERVOUS, ANXIOUS, OR ON EDGE: MORE THAN HALF THE DAYS
8. IF YOU CHECKED OFF ANY PROBLEMS, HOW DIFFICULT HAVE THESE MADE IT FOR YOU TO DO YOUR WORK, TAKE CARE OF THINGS AT HOME, OR GET ALONG WITH OTHER PEOPLE?: VERY DIFFICULT
GAD7 TOTAL SCORE: 12
8. IF YOU CHECKED OFF ANY PROBLEMS, HOW DIFFICULT HAVE THESE MADE IT FOR YOU TO DO YOUR WORK, TAKE CARE OF THINGS AT HOME, OR GET ALONG WITH OTHER PEOPLE?: VERY DIFFICULT
1. FEELING NERVOUS, ANXIOUS, OR ON EDGE: MORE THAN HALF THE DAYS
7. FEELING AFRAID AS IF SOMETHING AWFUL MIGHT HAPPEN: NOT AT ALL
6. BECOMING EASILY ANNOYED OR IRRITABLE: MORE THAN HALF THE DAYS
7. FEELING AFRAID AS IF SOMETHING AWFUL MIGHT HAPPEN: NOT AT ALL
3. WORRYING TOO MUCH ABOUT DIFFERENT THINGS: MORE THAN HALF THE DAYS
7. FEELING AFRAID AS IF SOMETHING AWFUL MIGHT HAPPEN: NOT AT ALL
4. TROUBLE RELAXING: MORE THAN HALF THE DAYS
5. BEING SO RESTLESS THAT IT IS HARD TO SIT STILL: MORE THAN HALF THE DAYS
2. NOT BEING ABLE TO STOP OR CONTROL WORRYING: MORE THAN HALF THE DAYS
7. FEELING AFRAID AS IF SOMETHING AWFUL MIGHT HAPPEN: NOT AT ALL
GAD7 TOTAL SCORE: 12
4. TROUBLE RELAXING: MORE THAN HALF THE DAYS
GAD7 TOTAL SCORE: 12
3. WORRYING TOO MUCH ABOUT DIFFERENT THINGS: MORE THAN HALF THE DAYS
1. FEELING NERVOUS, ANXIOUS, OR ON EDGE: MORE THAN HALF THE DAYS
3. WORRYING TOO MUCH ABOUT DIFFERENT THINGS: MORE THAN HALF THE DAYS
GAD7 TOTAL SCORE: 12
4. TROUBLE RELAXING: MORE THAN HALF THE DAYS

## 2022-05-09 ASSESSMENT — PATIENT HEALTH QUESTIONNAIRE - PHQ9
SUM OF ALL RESPONSES TO PHQ QUESTIONS 1-9: 17
10. IF YOU CHECKED OFF ANY PROBLEMS, HOW DIFFICULT HAVE THESE PROBLEMS MADE IT FOR YOU TO DO YOUR WORK, TAKE CARE OF THINGS AT HOME, OR GET ALONG WITH OTHER PEOPLE: VERY DIFFICULT
SUM OF ALL RESPONSES TO PHQ QUESTIONS 1-9: 17

## 2022-05-09 NOTE — TELEPHONE ENCOUNTER
Patient Quality Outreach    Patient is due for the following:   Depression  -  PHQ-9 Needed  Physical   Immunizations  -  Hepatitis B   CSA   Contolled Urine screening after: 6/15/2022    NEXT STEPS:   Schedule a yearly physical    Type of outreach:    Sent letter.      Questions for provider review:    None     Yumiko Hercules MA

## 2022-05-09 NOTE — PROGRESS NOTES
Jamison is a 56 year old who is being evaluated via a billable video visit.      TELEPHONE VISIT  Jailene Breen is a 56 year old pt. who is being evaluated via a billable telephone visit.      The patient has been notified of the following:    We have found that certain health care needs can be provided without the need for a physical exam. This service lets us provide the care you need with a short phone conversation. If a prescription is necessary we can send it directly to your pharmacy. If lab work is needed we can place an order for that and you can then stop by our lab to have the test done at a later time. Insurers are generally covering virtual visits as they would in-office visits so billing should not be different than normal.  If for some reason you do get billed incorrectly, you should contact the billing office to correct it and that number is in the AVS .    Patient has given verbal consent for a telephone visit?:  Yes   How would the pt like to obtain the AVS?:  katena  AVS SmartPhrase [PsychAVS] has been placed in 'Patient Instructions':  Yes     Start Time:  9: 03        End Time:  9:58 AM      Clinician Contact & Progress Note  Department of Psychiatry & Behavioral Sciences  [facility]    Patient: Jailene Breen (1965)     MRN: [id number]  Date of Treatment:  May 9, 2022  Duration of Treatment: Start Time: 9:03 AM; End Time: 9:58 AM  Provider: Franklin Breen MD     People present:   Provider: Franklin Cherry  Patient: Jailene Breen    Encounter Diagnoses   Name Primary?     Major depressive disorder, recurrent episode, severe with mixed features (H) Yes     Insomnia due to other mental disorder      Generalized anxiety disorder        Assessment (current symptoms):      PHQ 4/4/2022 5/9/2022 5/9/2022   PHQ-9 Total Score 17 17 17   Q9: Thoughts of better off dead/self-harm past 2 weeks More than half the days Several days Several days   F/U: Thoughts of  "suicide or self-harm Yes No No   F/U: Self harm-plan No - -   F/U: Self-harm action No - -   F/U: Safety concerns No No No     JEFF-7 SCORE 5/9/2022 5/9/2022 5/9/2022   Total Score - - -   Total Score - - 12 (moderate anxiety)   Total Score 12 12 12       Session Content:   - \"tired but ok\", had to bring daughter's dog to emergency vet last night  - Has been a hard week, went to [facility] unit last week. Did start prazosin for nightmares which has been helpful for sleep. He told [facility] staff he did not have a plan for suicide and did not want to die, but wanted to not wake up in the morning. Felt other depression symptoms were very strong. He did not end up being hospitalized and is mildly frustrated about this.   - Did talk with daughter about remark she made about how she was scared when Les was drunk in the past. She reports she's forgiven him, he didn't do anything wrong, Les did apologize.   - Him and wife have also been talking more, she is supportive. Been more open. He is feeling supported by in-laws as well (wife's parents). Went and saw his mother in law yesterday for mother's day.   - Yesterday was still hard, brought up a lot of feelings regarding his own mother. As far as he knows mom has been clear she does not want to see him, even in the midst of her hospitalization  - \"I feel stupid sometimes, like a little kid who needs his parents, but part of me still needs my mom and dad\"  - Has not gotten any updates on his mother. Has been consciously trying to not think about her as much, stopped asking family for updates.   - \"No matter what I do I can't control what she does, I just need to let it go\"  - He is still talking to his brother who will let Les know if mom changes her mind  - \"I still have my pride, I'm not going to beg her to take me back into her life. I'm tired of apologizing when I'm not wrong\"    - Says biological dad has been talking more, Les has reached out to him more often. Tries texting " "every day, calls once a week.       - Has intake for day programs tomorrow, and psychiatry visit on Wednesday.       Treatment Plan:   1. Depression- be more active (doing things around the house), more motivation, confidence in self, go back to school (degree in psychology)    2. Relationship with bio parents- have a conversation with mom without fighting, give her a hug. Have deeper conversations with bio dad, feel more like a son.     3. Feelings of worthlessness, guilt, self-blame- Less shame, more future oriented     Mental Status Exam:  Alertness: alert  and oriented  Appearance: n/a phone  Behavior/Demeanor: cooperative, pleasant and calm, with n/a phone eye contact   Speech: normal  Language: intact. Preferred language identified as English.  Psychomotor: n/a  Mood: depressed and anxious  Affect: blunted and appropriate; was congruent to mood; was congruent to content  Thought Process/Associations: unremarkable  Thought Content:  Reports none;  Denies suicidal and violent ideation and delusions  -Suicidal ideation: denies SI, denies intent, and denies plan  -Homicidal Ideation: denies  Perception: Reports none;  Denies auditory hallucinations and visual hallucinations; intact    Insight: good  Judgment: good  Cognition: does  appear grossly intact    Billing for \"Interactive Complexity\"?    No    Franklin Breen MD  Answers for HPI/ROS submitted by the patient on 5/9/2022  If you checked off any problems, how difficult have these problems made it for you to do your work, take care of things at home, or get along with other people?: Very difficult  PHQ9 TOTAL SCORE: 17  JEFF 7 TOTAL SCORE: 12      "

## 2022-05-09 NOTE — PROGRESS NOTES
"Video- Visit Details  Type of service:  video visit for medication management  Time of service:    Video Start Time: 12:59 PM               Video End Time: 1:35 PM    Mode of Communication:  Video Conference via AmWell  Consent:  Patient has given verbal consent for video visit?: Yes        [facility]  Psychiatry Clinic  PSYCHIATRY PROGRESS NOTE     CARE TEAM:    PCP- Aiden Resendez   Rheumatology- Raghu Parada MD  [facility]- Susana GRANT; Santa Farrell PsyD  Therapist - MD Sravanthi Weststephenie Breen is a 56 year old patient who prefers the name Jamison and uses pronouns he, him, his.   PERTINENT BACKGROUND                                 [most recent eval 09/21/20]   Psych pertinent item history includes suicidal ideation, SIB [cutting], mutiple psychotropic trials , severe med reaction, psych hosp (<3), SUBSTANCE USE: alcohol, substance use treatment  and Major Medical Problems (Rheumatoid Arthritis and Ankylosing Spondylitis)    DIAGNOSES                       Major Depressive Disorder, recurrent, severe, without psychotic features  Generalized Anxiety Disorder, with panic    Chronic Pain  Ankylosing Spondylitis    ASSESSMENT                       Today, Jamison reports worsening of depression since last appointment, primarily due to psychosocial stressors, including mothers hospitalization and believes that most of his symptoms are situational. He does believe that he was doing better \"before all this happened.\" He is reporting more hope for the future than in previous appointments and expresses excitement about an upcoming trip to [place]. Continues to report low energy but believes he will have enough energy to pursue many activities during his trip, which we discussed today. Will check in upon his return, however if his symptoms do improve while in [place] it is possible that continued apathy may be related to loss of sense " "of purpose and difficulty engaging with meaningful activities. Will continue to discuss with Les moving forward. No changes were made to medications today as he does feel some improvement and still <8 weeks since increasing fluoxetine.     Previously on vitamin D, requesting to restart today, last level >2 years ago. Will order and repeat prior to restarting therapy.     MNPMP was checked today:  Indicates no medication misuse .    PLAN                                                                                   1) Medications:  -fluoxetine 60mg daily   -quetiapine 50mg at bedtime  -quetiapine 25-50mg as needed for anxiety and panic  -liothyronine 25mcg daily    2) Therapy- psychodynamic    3) Next Due   Labs- TSH ordered; AP monitoring labs due 9/2022  EKG- as needed  Rating scales- serial PHQ9s    4) Referrals  none today    5) RTC: 4 weeks    6) Crisis Numbers:   Provided routinely in AVS     After hours:  [phone]    INTERIM HISTORY        The patient reports good treatment adherence.  History was provided by the patient who was a good historian.     Since the last visit:   -Went to EMPATH unit for a day. Was hoping for a medication change but they didn't want to do anything with his meds. He also didn't have IOP set up before he left, which he was hoping would happen.  -Situation with mom, just \"increased depression.\" The \"extreme depression is likely situational.\"  -Feels like he's \"barely\" been able to cope with what is going on with mom. After having a heart attack, fell down stairs, lay there for 2 days, ended up having a stroke before she was sent to the hospital. Mom still not wanting to talk to him because of the argument they had previously. Hasn't heard from mom, but brother has told him that she's still in the hospital. He notes feeling ready to let go of argument but mom not wanting to move on from it. He is having a hard time \"accepting that she's okay with where we left it (and not talking " "to him).\"  -Believes that he was better, before \"all this happened.\"  -Hasn't noticed \"anything different than what I have been dealing with\"   -Continues to feel worthless.  -Decreased energy, but does believe that he will have enough energy to engage  -No suicidal ideation, \"has been a while.\"  -On  is going to visit son and Uncle (who has stage 4 lung cancer) in [place]. Planning to stay for a week, visiting [place], going to [employer], riding quads.  anniversary is May 23, day they return. Really \"excited about it\", thinking about it gets him excited.   -Wondered about continuing prescription vitamin D. Has not taken in ~6 months.     RECENT PSYCH ROS:   Depression: depressed mood, excessive guilt and indecisiveness   Elevated:  none  Psychosis:  none  Anxiety:  as above  Trauma Related:  none  Dysregulation:  none  Eating Disorder: no  Other: no    Adverse Effects:  denies  Pertinent Negative Symptoms: No suicidal ideation, violent ideation, aggression, psychosis, hallucinations, delusions, otto and hypomania    Recent Substance Use:     Alcohol- none since , used to attend AA meetings prior to COVID.  Tobacco- denies  Caffeine- yes  Cannabis- medical cannabis  Opioids- as prescribed           Narcan Kit- prescribed   Other illicit drugs- none    FAMILY and SOCIAL HISTORY                                      [per patient report]            Family Hx:  Mom - depression (since  when sister )    Social Hx:  Financial/ Work- currently attending college at [employer], is hoping to start school at [employer] for a degree in psychology after being unable to work due to diagnosis of rheumatoid arthritis and ankylosing spondylitis  Partner/ -  in .  Children- 25 and 27 year old kids      Living situation- [place], house with wife and 2 kids      Social/ Spiritual Support- Talks to  every two weeks for lunch, but \"doesn't really have friends\". Family is supportive.  "     PSYCH and SUBSTANCE USE Critical Summary Points since July 2020 September - started escitalopram  November - increased escitalopram 20mg daily  February 2021 - stopped hydroxyzine; started trazodone 50mg at bedtime and 25mg daily as needed for anxiety  March/April - Patient hospitalized started on Abilify, titrated to 5mg daily; escitalopram switched to fluoxetine 40mg daily; started prazosin 1mg at bedtime. Pain management switched oxycodone to Suboxone and started medical marijuana.  May - patient still taking hydroxyzine, discontinued today.  June - patient hospitalized (6/15/21) discontinued Abilify and trazodone, started on quetiapine & titrated to 100mg at bedtime and 25mg as needed for sleep.   August - increase quetiapine 150mg   November - discontinued quetiapine 150mg (patient ran out of medications and did not take for several weeks).  December - patient restarted quetiapine 50mg at bedtime   March - increased fluoxetine 60mg      Medication  Dose   (mg) Effect  Dates of Use   fluoxetine 40 Felt like was initially helpful 2016 - 2017   sertraline 100 Effective initially, eventually self discontinued as not helpful 2018 - 7/2020   duloxetine 30 BID Used to treat nerve pain; felt weird on it 2017   bupropion  BID ineffective 2017 - 2019   nortriptyline 50 For pain 2017 -  3/2019   mirtazapine   Dissociated for entire first week after taking 2012   trazodone 50   2013             hydroxyzine 25 QID prn For anxiety and panic attacks; not effective for severe anxiety 2015 - 2017   buspirone 15 TID   2016 - 2017   gabapentin 100 TID   2013 - 2016   alprazolam 0.5 TID For anxiety 2571-4698   diazepam 2 BID For anxiety 2016   lorazepam 1 TID For anxiety 2016   clonazepam 1 For anxiety 2016 - present      MEDICAL HISTORY  and ALLERGY     ALLERGIES: Mirtazapine, Hydrocodone, Ms contin [morphine], and Remeron soltab     Patient Active Problem List   Diagnosis     CARDIOVASCULAR SCREENING; LDL GOAL  LESS THAN 160     Obesity, Class I, BMI 30-34.9     Tear meniscus knee, right, initial encounter     Rheumatoid arthritis involving multiple sites, unspecified rheumatoid factor presence     Chronic pain     Right-sided thoracic back pain, unspecified chronicity     Generalized anxiety disorder     Panic attack     Ankylosing spondylitis (H)     Tobacco use disorder     Moderate major depression (H)     Immunocompromised (H)     Essential hypertension with goal blood pressure less than 140/90     Suicidal ideation     Insomnia due to other mental disorder     Major depressive disorder, recurrent episode, severe with mixed features (H)     Chronic back pain greater than 3 months duration     Contact dermatitis and eczema     Dermatitis     Drug dependence (H)     Encounter for screening colonoscopy     Esophageal reflux     Hematuria     Hyperlipidemia     Lumbar radiculopathy     Plantar fascial fibromatosis     Sprain of sacroiliac region     Overweight     Nonintractable migraine     Narcotic abuse, continuous (H)         MEDICAL REVIEW OF SYSTEMS     Contraception- none  A comprehensive review of systems was performed and is negative other than noted in the HPI.    MEDICATIONS          Current Outpatient Medications   Medication Sig Dispense Refill     acetaminophen (TYLENOL) 500 MG tablet Take 1,000 mg by mouth every 6 hours as needed for mild pain (headache)       buprenorphine HCl-naloxone HCl (SUBOXONE) 8-2 MG per film Place 0.5 Film under the tongue 2 times daily Max 1 film per day. Fill 5/6/22 and start 5/8/22 to last 30 days for chronic pain 30 each 0     etanercept (ENBREL SURECLICK) 50 MG/ML autoinjector Inject 50 mg Subcutaneous once a week 1 mL 3     FLUoxetine (PROZAC) 20 MG capsule Take 3 capsules (60 mg) by mouth daily 90 capsule 1     folic acid (FOLVITE) 1 MG tablet TAKE THREE TABLETS BY MOUTH EVERY  tablet 2     liothyronine (CYTOMEL) 25 MCG tablet Take 1 tablet (25 mcg) by mouth daily 30  tablet 2     medical cannabis (Patient's own supply) See Admin Instructions (The purpose of this order is to document that the patient reports taking medical cannabis.  This is not a prescription, and is not used to certify that the patient has a qualifying medical condition.)     Pills PRN   Lozenges PRN       methotrexate sodium 2.5 MG TABS Take 4 tablets (10 mg) by mouth every 7 days Labs past due. Order in computer.Please have drawn, IMPORTANT 48 tablet 0     nicotine polacrilex (NICORETTE) 4 MG gum CHEW AND PARK 1 PIECE INSIDE CHEEK AS NEEDED 200 each 1     ondansetron (ZOFRAN-ODT) 4 MG ODT tab DISSOLVE TWO TABLETS BY MOUTH EVERY 12 HOURS AS NEEDED FOR NAUSEA       prazosin (MINIPRESS) 1 MG capsule Take 1 capsule (1 mg) by mouth At Bedtime 30 capsule 0     QUEtiapine (SEROQUEL) 50 MG tablet Take 1 tablet (50 mg) by mouth At Bedtime May take additional 25-50mg (1/2 - 1 tablet) daily prn for anxiety/panic attacks 45 tablet 2     vitamin D2 (ERGOCALCIFEROL) 13403 units (1250 mcg) capsule TAKE 1 CAPSULE BY MOUTH EVERY MONDAY AND THURSDAY       VITALS                     There were no vitals taken for this visit.   MENTAL STATUS EXAM         Alertness: alert  and oriented  Appearance: adequately groomed  Behavior/Demeanor: cooperative, with good  eye contact   Speech: regular rate and rhythm  Language: intact  Psychomotor: normal or unremarkable  Mood: depressed   Affect: appropriate; congruent to: mood- yes, content- yes  Thought Process/Associations: unremarkable  Thought Content:  Reports none;  Denies suicidal ideation, violent ideation and delusions   Perception:  Reports none;  Denies auditory hallucinations and visual hallucinations  Insight: good  Judgment: good  Cognition: (6) oriented: time, person, and place  attention span: intact  concentration: intact  recent memory: intact  remote memory: intact  fund of knowledge: appropriate  Gait and Station: N/A (telehealth)    LABS and DATA     PHQ9 TODAY =  16  PHQ 5/9/2022 5/9/2022 5/11/2022   PHQ-9 Total Score 17 17 16   Q9: Thoughts of better off dead/self-harm past 2 weeks Several days Several days Several days   F/U: Thoughts of suicide or self-harm No No No   F/U: Self harm-plan - - -   F/U: Self-harm action - - -   F/U: Safety concerns No No No       Recent Labs   Lab Test 10/11/21  1123 06/16/21  0741   CR 0.91 1.09   GFRESTIMATED >90 77     Recent Labs   Lab Test 10/11/21  1123 06/16/21  0741   AST 37 23   ALT 47 33   ALKPHOS 102 88     Recent Labs   Lab Test 10/11/21  1123 06/16/21  0741   GLC 97 92   A1C 5.2  --      Recent Labs   Lab Test 10/11/21  1123   CHOL 209*   TRIG 225*   *   HDL 30*     Recent Labs   Lab Test 10/11/21  1123 06/16/21  0741   AST 37 23   ALT 47 33   ALKPHOS 102 88     Recent Labs   Lab Test 10/11/21  1123 06/16/21  0741 03/21/21  0808 03/17/21  0748   WBC 7.0 6.1 6.8 8.3   ANEU  --   --  3.7 4.4   HGB 16.2 14.6 15.6 15.0    166 246 211     PSYCHOTROPIC DRUG INTERACTIONS   Increased risk of QTc prolongation: fluoxetine, quetiapine, Suboxone  Increased risk of serotonin syndrome: fluoxetine, Suboxone  Increased risk of CNS/respiratory depression: Suboxone, quetiapine    MANAGEMENT:  Monitoring for adverse effects and patient is aware of risks    RISK STATEMENT for SAFETY     Today, Jamison Breen does not report any suicidal ideation and does not appear to be an imminent risk to self or others.    SUICIDE RISK ASSESSMENT-  Risk factors for self-harm: previous suicide attempt, recent psych inpt , financial/legal stress, significant pain and takes opiates.  Mitigating factors: h/o seeking help , commitment to family and participation in DBT or day program.  The patient does not appear to be at imminent risk for self-harm, hospitalization is recommended which the pt does not agree to. No hospitalization will be arranged. Safety plan reviewed today including contacting family, friends (), day treatment team, clinic, or  crisis line.    TREATMENT RISK STATEMENT:  The risks, benefits, alternatives and potential adverse effects have been discussed and are understood by the pt. The pt understands the risks of using street drugs or alcohol. There are no medical contraindications, the pt agrees to treatment with the ability to do so. The pt knows to call the clinic for any problems or to access emergency care if needed.  Medical and substance use concerns are documented above.  Psychotropic drug interaction check was done, including changes made today.    PROVIDER: Slim Sage MD    Patient was not staffed.  Supervisor is Dr. Downing who will sign the note.

## 2022-05-09 NOTE — LETTER
[address] [address]  [phone]  Dept: [phone]    May 9, 2022    Jailene Breen  [address] [facility] [address]    Dear Jailene Breen,     At [facility] we care about your health and are committed to providing quality patient care.    Which includes staying current on preventive cancer screenings.  You can increase your chances of finding and treating cancers through regular screenings.      Our records indicate you may be due for the following preventive screening(s):  Depression  -  PHQ-9 Needed  Physical   Immunizations  -  Hepatitis B   CSA   Contolled Urine screening after: 6/15/2022    To schedule an appointment or discuss this screening further, you may contact us by phone at the [facility] at [phone] or online through the patient portal/[employer] @ [email or web address]    If you have had any of the screenings listed above at another facility, please call us so that we may update your chart.      Your partners in health,      Quality Committee at [facility]

## 2022-05-10 ENCOUNTER — TELEPHONE (OUTPATIENT)
Dept: BEHAVIORAL HEALTH | Facility: CLINIC | Age: 57
End: 2022-05-10
Payer: COMMERCIAL

## 2022-05-10 ASSESSMENT — ANXIETY QUESTIONNAIRES
GAD7 TOTAL SCORE: 12

## 2022-05-10 ASSESSMENT — PATIENT HEALTH QUESTIONNAIRE - PHQ9: SUM OF ALL RESPONSES TO PHQ QUESTIONS 1-9: 17

## 2022-05-10 NOTE — TELEPHONE ENCOUNTER
Patient have a video appointment today at 10am with [facility]. Writer placed a call this morning to check in patient. Writer got a hold of patient. Patient would like to cancel the appointment. Patient have a family emergency. Patient will call Intake when available to reschedule. Writer provided patient with Intake's number to reschedule.Writer informed patient's .

## 2022-05-11 ENCOUNTER — VIRTUAL VISIT (OUTPATIENT)
Dept: PSYCHIATRY | Facility: CLINIC | Age: 57
End: 2022-05-11
Attending: PSYCHIATRY & NEUROLOGY
Payer: COMMERCIAL

## 2022-05-11 DIAGNOSIS — Z13.21 ENCOUNTER FOR VITAMIN DEFICIENCY SCREENING: ICD-10-CM

## 2022-05-11 DIAGNOSIS — E55.9 VITAMIN D DEFICIENCY: ICD-10-CM

## 2022-05-11 DIAGNOSIS — F32.A DEPRESSION, UNSPECIFIED DEPRESSION TYPE: ICD-10-CM

## 2022-05-11 DIAGNOSIS — F41.9 ANXIETY: ICD-10-CM

## 2022-05-11 DIAGNOSIS — F51.05 INSOMNIA DUE TO OTHER MENTAL DISORDER: Primary | ICD-10-CM

## 2022-05-11 DIAGNOSIS — F99 INSOMNIA DUE TO OTHER MENTAL DISORDER: Primary | ICD-10-CM

## 2022-05-11 PROCEDURE — 99214 OFFICE O/P EST MOD 30 MIN: CPT | Mod: GT | Performed by: STUDENT IN AN ORGANIZED HEALTH CARE EDUCATION/TRAINING PROGRAM

## 2022-05-11 RX ORDER — PRAZOSIN HYDROCHLORIDE 1 MG/1
1 CAPSULE ORAL AT BEDTIME
Qty: 30 CAPSULE | Refills: 0 | Status: SHIPPED | OUTPATIENT
Start: 2022-05-11 | End: 2022-06-08

## 2022-05-11 RX ORDER — QUETIAPINE FUMARATE 50 MG/1
50 TABLET, FILM COATED ORAL AT BEDTIME
Qty: 45 TABLET | Refills: 2 | Status: SHIPPED | OUTPATIENT
Start: 2022-05-11 | End: 2022-06-08

## 2022-05-11 RX ORDER — LIOTHYRONINE SODIUM 25 UG/1
25 TABLET ORAL DAILY
Qty: 30 TABLET | Refills: 2 | Status: SHIPPED | OUTPATIENT
Start: 2022-05-11 | End: 2022-06-08

## 2022-05-11 ASSESSMENT — PATIENT HEALTH QUESTIONNAIRE - PHQ9
SUM OF ALL RESPONSES TO PHQ QUESTIONS 1-9: 16
SUM OF ALL RESPONSES TO PHQ QUESTIONS 1-9: 16
10. IF YOU CHECKED OFF ANY PROBLEMS, HOW DIFFICULT HAVE THESE PROBLEMS MADE IT FOR YOU TO DO YOUR WORK, TAKE CARE OF THINGS AT HOME, OR GET ALONG WITH OTHER PEOPLE: EXTREMELY DIFFICULT

## 2022-05-11 NOTE — NURSING NOTE
Patient denies any changes since echeck-in regarding medication and allergies and states all information entered during echeck-in remains accurate.  Maia Upton VF

## 2022-05-11 NOTE — PATIENT INSTRUCTIONS
**For crisis resources, please see the information at the end of this document**   Patient Education    Thank you for coming to the [facility] [place] [facility].    Lab Testing:  If you had lab testing today and your results are reassuring or normal they will be mailed to you or sent through [employer] within 7 days. If the lab tests need quick action we will call you with the results. The phone number we will call with results is # [phone]. If this is not the best number please call our clinic and change the number.     Medication Refills:  If you need any refills please call your pharmacy and they will contact us. Our fax number for refills is [phone]. Please allow three business days for refill processing.   If you need to change to a different pharmacy, please contact the new pharmacy directly. The new pharmacy will help you get your medications transferred.     Contact Us:  Please call [phone] during business hours (8-5:00 M-F).  If you have medication related questions after clinic hours, or on the weekend, please call [phone].    Financial Assistance [phone]  Medical Records [phone]       MENTAL HEALTH CRISIS RESOURCES:  For a emergency help, please call 911 or go to the nearest Emergency Department.     Emergency Walk-In Options:   EmPATH Unit @ [facility] ([place]): [phone] - Specialized mental health emergency area designed to be calming  [facility] ([place]): [phone]  [facility] ([place]): [phone]  [facility]): [phone]    County Crisis Information:   [place]: [phone]  [place]: [phone]  [place] ([place]) - Adult: [phone]     Child: [phone]  [place] - Adult: [phone]     Child: [phone]  [place]: [phone]  List of all [facility] resources:    https://[place].gov/dhs/people-we-serve/adults/health-care/mental-health/resources/crisis-contacts.jsp    National Crisis Information:   Crisis Text Line: Text  [place]  to 381299  National Suicide Prevention Lifeline: 8-846-041-TALK (1-894.652.7534)       For online chat options, visit https://suicidepreventionlifeline.org/chat/  Poison Control Center: 5-923-218-0117  Trans Lifeline: 5-447-840-9427 - Hotline for transgender people of all ages  The Tu Project: 3-619-216-0216 - Hotline for LGBT youth     For Non-Emergency Support:   Fast Tracker: Mental Health & Substance Use Disorder Resources -   https://www.[employer].org/

## 2022-05-11 NOTE — PROGRESS NOTES
Jailene Breen is a 56 year old who has consented to receive services via billable video visit.      Pt will join video visit via: DianDian  If there are problems joining the visit, send backup video invite via: Text to preferred phone: [phone]      Originating Location (patient location): Patient's home  Distant Location (provider location): [facility] [place] [facility]    Will anyone else be joining the video visit? No    How would you prefer to obtain AVS?: [employer]  Answers for HPI/ROS submitted by the patient on 5/11/2022  If you checked off any problems, how difficult have these problems made it for you to do your work, take care of things at home, or get along with other people?: Extremely difficult  PHQ9 TOTAL SCORE: 16  JEFF 7 TOTAL SCORE: 12

## 2022-05-13 ENCOUNTER — TELEPHONE (OUTPATIENT)
Dept: RHEUMATOLOGY | Facility: CLINIC | Age: 57
End: 2022-05-13

## 2022-05-13 ENCOUNTER — TELEPHONE (OUTPATIENT)
Dept: RHEUMATOLOGY | Facility: CLINIC | Age: 57
End: 2022-05-13
Payer: COMMERCIAL

## 2022-05-13 ENCOUNTER — MYC MEDICAL ADVICE (OUTPATIENT)
Dept: PALLIATIVE MEDICINE | Facility: CLINIC | Age: 57
End: 2022-05-13
Payer: COMMERCIAL

## 2022-05-13 NOTE — TELEPHONE ENCOUNTER
Prior Authorization Approval    Authorization Effective Date: 5/13/2022  Authorization Expiration Date: 12/31/2022  Medication: Enbrel   Approved Dose/Quantity: 4ml per 28 days  Reference #: [id number]   Insurance Company: [employer] Part D - Phone [phone] Fax [phone]  Expected CoPay:       CoPay Card Available:      Foundation Assistance Needed:    Which Pharmacy is filling the prescription (Not needed for infusion/clinic administered): [facility] - [place] [address]  Pharmacy Notified: Yes  Patient Notified: Yes

## 2022-05-13 NOTE — TELEPHONE ENCOUNTER
Free Drug Application Initiated  Medication: Enbrel 50 mg sureclick  Sponsor: [employer]  Phone #: [phone]  Fax #: [phone]  Additional Information: Emailed RN/MD/PT forms

## 2022-05-16 ENCOUNTER — MYC MEDICAL ADVICE (OUTPATIENT)
Dept: PALLIATIVE MEDICINE | Facility: CLINIC | Age: 57
End: 2022-05-16
Payer: COMMERCIAL

## 2022-05-16 ENCOUNTER — VIRTUAL VISIT (OUTPATIENT)
Dept: PSYCHIATRY | Facility: CLINIC | Age: 57
End: 2022-05-16
Attending: PHYSICAL MEDICINE & REHABILITATION
Payer: COMMERCIAL

## 2022-05-16 DIAGNOSIS — F33.2 MAJOR DEPRESSIVE DISORDER, RECURRENT EPISODE, SEVERE WITH MIXED FEATURES (H): Primary | ICD-10-CM

## 2022-05-16 DIAGNOSIS — F51.05 INSOMNIA DUE TO OTHER MENTAL DISORDER: ICD-10-CM

## 2022-05-16 DIAGNOSIS — F99 INSOMNIA DUE TO OTHER MENTAL DISORDER: ICD-10-CM

## 2022-05-16 PROCEDURE — 99207 PR SERVICE NOT STAFFED W/SUPERV PROV: CPT | Mod: 95 | Performed by: STUDENT IN AN ORGANIZED HEALTH CARE EDUCATION/TRAINING PROGRAM

## 2022-05-16 ASSESSMENT — PATIENT HEALTH QUESTIONNAIRE - PHQ9
10. IF YOU CHECKED OFF ANY PROBLEMS, HOW DIFFICULT HAVE THESE PROBLEMS MADE IT FOR YOU TO DO YOUR WORK, TAKE CARE OF THINGS AT HOME, OR GET ALONG WITH OTHER PEOPLE: EXTREMELY DIFFICULT
SUM OF ALL RESPONSES TO PHQ QUESTIONS 1-9: 14
SUM OF ALL RESPONSES TO PHQ QUESTIONS 1-9: 14

## 2022-05-16 NOTE — TELEPHONE ENCOUNTER
See the 5/13/22 mychart encounter for more information.    Cha Molina RN-BSN  [facility]

## 2022-05-16 NOTE — TELEPHONE ENCOUNTER
Per patient myChart message:  From: Jamison Breen      Created: 5/13/2022 3:06 PM      Message from Jamison Breen, sent 2:50 PM     I have somewhat an emergency, can someone please call me at [phone]      and  (Brought over from the 5/16/22 encounter):  From: Jamison Breen      Created: 5/16/2022 1:18 PM      Hi can you please help me. I'm in so much pain from my flare up. I'm supposed to go on vacation Wednesday and this happens.  I'm having problems with my new insurance covering my enbral that I'm past due taking and I'm in the process to see if [place] can help me but will take some time.       _________________________________    Mychart sent to pt:  From: Cha Molina RN      Created: 5/16/2022 2:33 PM      Hi Jamison,  Looks like the prior authorization for the enbrel was approved.  Were you able to  the medication?  Any ideas on what you think would be helpful?  What is your question/request?     SANDRA Eubanks-BSN  [facility]

## 2022-05-16 NOTE — PROGRESS NOTES
"VIDEO VISIT  Jailene Breen is a 56 year old patient who is being evaluated via a billable video visit.      How would you like to obtain your AVS?: MyChart  AVS SmartPhrase [PsychAVS] has been placed in 'Patient Instructions': Yes      Video- Visit Details  Type of service:  video visit for psychotherapy  Time of service:    Start Time:  9:00 AM    End Time:  9:55 AM    Originating Site (patient location):  [facility]   Location- Patient's home  Distant Site (provider location):  Remote location  Mode of Communication:  Video Conference via Karmasphere    Clinician Contact & Progress Note  Department of Psychiatry & Behavioral Sciences  [facility]    Patient: Jailene Breen (1965)     MRN: [id number]  Date of Treatment:  May 16, 2022  Duration of Treatment: Start Time: 9:03 AM; End Time: 9:58 AM  Provider: Franklin Breen MD     People present:   Provider: Franklin Cherry  Patient: Jailene Breen    Encounter Diagnoses   Name Primary?     Major depressive disorder, recurrent episode, severe with mixed features (H) Yes     Insomnia due to other mental disorder        Assessment (current symptoms):      PHQ 5/9/2022 5/11/2022 5/16/2022   PHQ-9 Total Score 17 16 14   Q9: Thoughts of better off dead/self-harm past 2 weeks Several days Several days Not at all   F/U: Thoughts of suicide or self-harm No No -   F/U: Self harm-plan - - -   F/U: Self-harm action - - -   F/U: Safety concerns No No -     JEFF-7 SCORE 5/9/2022 5/9/2022 5/9/2022   Total Score - - -   Total Score - - 12 (moderate anxiety)   Total Score 12 12 12       Session Content:   - \"been going ok\", excited about upcoming trip this week, leaving Wednesday morning  - Going to [place] to see son who has lived there since the Fall with his girlfriend  - Planning to see some sights, ride ATVs on sand dunes. Reports wanting to go on some hikes but recently hurt his knee moving a very heavy freezer (discussed this heavy " "physical activity in context of significant physical issues the last few months).   - Will try to get into PCP before trip to look at knee      - Jamison is planning to see his maternal uncle who is apparently \"dying from lung cancer\". Uncle has called Les several times this week.     - Discussed our termination in June.     - Due to insurance issues reports he is regretting going on Medicare. Reports it has caused many obstacles in the transition. E.g. insurance not accepting previous co-pay assistance for Embril, new co-pays on other meds  - He is due for Embril today  - \"this is why I have thoughts of being better off dead, I'm costing my family money, I don't want to be a burden\"  - Denies any recent SI, no suicidal intent or planning       Treatment Plan:   1. Depression- be more active (doing things around the house), more motivation, confidence in self, go back to school (degree in psychology)    2. Relationship with bio parents- have a conversation with mom without fighting, give her a hug. Have deeper conversations with bio dad, feel more like a son.     3. Feelings of worthlessness, guilt, self-blame- Less shame, more future oriented     Mental Status Exam:  Alertness: alert  and oriented  Appearance: well groomed  Behavior/Demeanor: cooperative, pleasant and calm, with good  eye contact   Speech: normal  Language: intact. Preferred language identified as English.  Psychomotor: normal or unremarkable  Mood: depressed and anxious  Affect: blunted and appropriate; was congruent to mood; was congruent to content  Thought Process/Associations: unremarkable  Thought Content:  Reports none;  Denies suicidal and violent ideation and delusions  -Suicidal ideation: denies SI, denies intent, and denies plan  -Homicidal Ideation: denies  Perception: Reports none;  Denies auditory hallucinations and visual hallucinations; intact    Insight: good  Judgment: good  Cognition: does  appear grossly intact    Billing for " "\"Interactive Complexity\"?    No    Franklin Breen MD    Answers for HPI/ROS submitted by the patient on 5/16/2022  If you checked off any problems, how difficult have these problems made it for you to do your work, take care of things at home, or get along with other people?: Extremely difficult  PHQ9 TOTAL SCORE: 14  JEFF 7 TOTAL SCORE: 12      "

## 2022-05-16 NOTE — TELEPHONE ENCOUNTER
Per patient Daryahart message:  From: Jamison Breen      Created: 5/16/2022 2:37 PM      I'm having issues with the high copay and trying to get help with it through [facility]. Low dose of prednisone and oxycodone is what helped in the past. I use [facility] in [place].  For some reason Medicare doesn't work with the enbrel copay card I have. Im [facility] so it's a waiting game now.            ,Routed to provider.      ,Cha, RN-BSN  [facility]

## 2022-05-17 ASSESSMENT — PATIENT HEALTH QUESTIONNAIRE - PHQ9: SUM OF ALL RESPONSES TO PHQ QUESTIONS 1-9: 14

## 2022-05-18 NOTE — TELEPHONE ENCOUNTER
Caty Pendleton, RN         10:21 AM  Note  PAP form signed by MD and sent into [employer]. Confirmed via rightfax.      KERLINE WrayN, RN   Rheumatology RN Care Coordinator   [facility]   [phone]

## 2022-05-19 NOTE — TELEPHONE ENCOUNTER
Free Drug Application Approved  Effective Dates: till 12/31/22  Patient notified: via [employer]  Additional Information:

## 2022-05-24 ENCOUNTER — MYC MEDICAL ADVICE (OUTPATIENT)
Dept: PALLIATIVE MEDICINE | Facility: CLINIC | Age: 57
End: 2022-05-24
Payer: COMMERCIAL

## 2022-05-24 NOTE — TELEPHONE ENCOUNTER
See the 5/13/22 mychart encounter for more information.    Cha Molina RN-BSN  [facility]

## 2022-05-24 NOTE — TELEPHONE ENCOUNTER
Per patient myChart message (Brought over from the 5/13/22 encounter):  From: Jamison Breen      Created: 5/24/2022 3:38 AM      Hi, im having a real bad flare up and I haven't been able to rake my enbrel for a couple weeks because of insurance. . I finally got approved for help and waiting to receive my enbrel, could you please send me some pain meds ( oxycodone) to [facility] pharmacy in [place].        _________________________________    Mychart sent to pt:  From: Cha Molina RN      Created: 5/24/2022 8:36 AM      Hi Jamison,  Your message has been received and sent on to JUANITA Ramon CNP to review.        Cha RN-BSN  [facility]

## 2022-05-27 ENCOUNTER — VIRTUAL VISIT (OUTPATIENT)
Dept: PALLIATIVE MEDICINE | Facility: CLINIC | Age: 57
End: 2022-05-27
Payer: COMMERCIAL

## 2022-05-27 DIAGNOSIS — F11.20 UNCOMPLICATED OPIOID DEPENDENCE (H): ICD-10-CM

## 2022-05-27 DIAGNOSIS — M25.50 MULTIPLE JOINT PAIN: Primary | ICD-10-CM

## 2022-05-27 DIAGNOSIS — M45.7 ANKYLOSING SPONDYLITIS OF LUMBOSACRAL REGION (H): ICD-10-CM

## 2022-05-27 DIAGNOSIS — M54.59 LUMBAR FACET JOINT PAIN: ICD-10-CM

## 2022-05-27 DIAGNOSIS — M51.369 DDD (DEGENERATIVE DISC DISEASE), LUMBAR: ICD-10-CM

## 2022-05-27 DIAGNOSIS — M25.551 HIP PAIN, RIGHT: ICD-10-CM

## 2022-05-27 DIAGNOSIS — G89.29 CHRONIC BILATERAL LOW BACK PAIN WITH RIGHT-SIDED SCIATICA: ICD-10-CM

## 2022-05-27 DIAGNOSIS — M54.41 CHRONIC BILATERAL LOW BACK PAIN WITH RIGHT-SIDED SCIATICA: ICD-10-CM

## 2022-05-27 DIAGNOSIS — M05.79 RHEUMATOID ARTHRITIS INVOLVING MULTIPLE SITES WITH POSITIVE RHEUMATOID FACTOR (H): ICD-10-CM

## 2022-05-27 PROCEDURE — 99214 OFFICE O/P EST MOD 30 MIN: CPT | Mod: 95 | Performed by: NURSE PRACTITIONER

## 2022-05-27 RX ORDER — OXYCODONE HYDROCHLORIDE 5 MG/1
5 TABLET ORAL EVERY 6 HOURS PRN
Qty: 21 TABLET | Refills: 0 | Status: SHIPPED | OUTPATIENT
Start: 2022-05-27 | End: 2022-06-08

## 2022-05-27 ASSESSMENT — PAIN SCALES - GENERAL: PAINLEVEL: EXTREME PAIN (9)

## 2022-05-27 NOTE — PROGRESS NOTES
Jamison is a 56 year old who is being evaluated via a billable video visit.      How would you like to obtain your AVS? MyChart  If the video visit is dropped, the invitation should be resent by: Text to cell phone: [phone]  Will anyone else be joining your video visit? No   Is Pt currently in [place]? Yes    Nicolasa Krueger MA      NOTE:  If Pt is not in [place], Appointment needs to be canceled and rescheduled.          Video-Visit Details    Type of service:  Video Visit  Video Start Time: 8:46 AM  Video End Time:9:09 AM    Originating Location (pt. Location): Home    Distant Location (provider location):  [facility]     Platform used for Video Visit: Well    [facility]    5/27/2022        Chief complaint:   Lower right back  Hands, wrists, shoulders and left knee        Interval history:   Jailene Breen is a 56 year old male is known to me for   Lumbar spondylosis, pain is worse with extension and rotation indicating a facetogenic component to pain  Lumbar degenerative disc disease  SI joint pain  Ankylosing spondylitis  Myofascial pain  Chronic pain syndrome  PMHx includes: Panic attacks, back pain, arthritis, anxiety, ankylosing spondylitis  PSHx includes: Right knee surgery ×2, left foot surgery right knee arthroscopy        Recommendations/plan at the last visit on 2/4/2022 included:  1. Recommended to increase activity while pacing himself  2. Physical Therapy:  Let's do this once you are done with the PHP program  3. Clinical Health Psychologist: keep working with your psychiatrist and therapist  4. Diagnostic Studies:  None  5. Medication Management:    1. restart Suboxone 8/2mg films, use 0.5 film under the tongue Q 12 hours.   2. Oxycodone for pain flare since he is off of his biologics  3. Patient certified for medical cannabis in April 2021  6. Further procedures recommended: none at present  7. Recommendations to PCP: see above  8. Follow up: 6-8  weeks via video due to COVID-19 viral threat. Please call [phone] to make your follow-up appointment with me.           Since his last visit, Jailene Breen reports:    Interval history May 27, 2022  -having an arthritis flare as is currently off of his disease modifying agents. This has now been covered and he will begin this again soon (Enbrel).   -having pain in multiple joints and low back, see above.         Interval history February 4, 2022  -he awoke on 1/1/2022 at 3:30 AM and felt like he was going to die. He called the ambulance and went to the hospital. He was then diagnosed with COVID-19 on 1/3/2022 and has been feeling sick ever since. He has been nauseated and has not been able to take his RA DMARD.   -he has increased joint pain in all of his joints, low back pain and neck pain is worse. He cannot dress himself due to pain. States his finger joints are all swollen. He is using marijuana from [place] Cannabis program and prednisone from  Rheumatology.   -his rheumatologist is out of the clinic for a week. The covering rheumatologist on call felt he could re-start his rheumatology medication. Jamison is not comfortable re-starting his RA medication yet since he is still feeling sweaty and coughing and nauseated.       Interval history October 20, 2021  -he is hurting all over as he previously tapered off of the Suboxone in prep for his hernia surgery. This surgery was postponed due to no beds available due to COVID-19 pandemic. His pain is bad now with no pain medication on board.   -Jamison had preferred to taper off of Suboxone in prep for surgery despite discussing ability to medicate over it and maintain his previous dosing.   -explained that best course of action would be to get him back on Suboxone between 8-16mg/day during the perioperative period (he had been on 16mg/day in divided dosing prior to taper).   -Les did not previously feel that Suboxone was very helpful, but he does notice that his pain is  "worse being off of the Suboxone.   -he had his court case for disability recently and he states that his  feels that it went well, waiting to see if this ruling will be in his favor.     Interval history April 21, 2021  -he had COVID injection on Friday 4/16/2021 this was his 2nd injection. Nicasio crummy over the weekend.   -he is still dealing with the right hip pain that is the worst pain  -he is having left hip pain as well   -bilateral knee pain as well. It is hard for him to do stairs due to pain.   -he feels that the increase in Suboxone to 4/1mg QID has been a \"titch\" helpful.   -he does not find Voltaren gel or Aspercreme with 4% lidocaine helpful for his knee pain.  -he has finished the partial hospitalization program.  -he will be starting the adult day program for psychiatric care.     Interval history April 6, 2021  -Les states he has been feeling like \"hell.\"   -his low back is bothering him and it will radiate into his right groin and down his right leg to the foot.   -he is walking with a cane.   - He was hospitalized twice recently for suicidal ideation. Mood is still low. Denies SI   -he is attending the Partial Hospitalization program. This is all on Zoom. Lasts for 6 hours Monday through Friday.   -he is also working with his psychiatrist and psychologist in addition to that.  -he tries to walk a few times per week, he thinks this is about a half a mile or so.  -he also notes he tries not to go out much to avoid injury or over-activity.   -he is back on Enbrel and methotrexate. He notes that this is a bit helpful for his joint pain.   -he does not get any relief from the Subxone. He states it \"doesn't do anything.\" he relates he has been on OxyContin 20mg TID in the past. He is frustrated that I switched him off of Oxycodone/OxyContin when he was in the hospital for SI. We discussed again how being on full  Mu-agonist opiates are not a good option for him. Additionally, when we began working " "together on 3/31/2017, he was NOT on daily opiates. In the time we have worked together, he has gone from being on no opiates on a daily basis to #15 tabs/month of oxycodone to slowly increasing to Oxycodone 35mg/day in divided dosing using OxyContin 10mg BID and oxycodone 5mg TID PRN which is 52mg OME (oral morphine equivalent, also known as morphine milligram equivalent-MME). In that time, his functional level and his mood has deteriorated despite increasing opiate dosages. -  -discussed I am open to continuing Suboxone and recommend increasing to 4/1mg every 8 hours, a 50% increase. Patient stated that \"that isn't going to be high enough.\" when I asked what he felt might be helpful, he stated he felt the dosage should be tripled. Explained that doing so is not a safe choice. Additionally, discussed that Suboxone hits the peak pain relief (plateaus) at between 16-18mg/day as the mu-receptors are all flooded.   He is concerned re: having upcoming surgery for a hiatal hernia in May. Reviewed that he can be on short-acting opiates as his surgeon deems necessary, but that he would be tapered off of them and continue on Suboxone with me long term.     Interval history February 16, 2021  -He has had more pain since the right  RFA done 2/1/2021.  -he slipped taking his garbage out last week, he tweaked his back, did not fall.    -he is off of his RA meds as he has an upper respiratory infection  -the extreme cold makes his arthritis pain worse  -he may be having a surgery to fix a hiatal hernia  -flexeril caused urinary retention, this was better when he stopped taking it.     Interval history 11/11/2020  -he has been having issues with emptying his bladder and he has had 2 Carrasco catheters in the near future  -he is trying to get in with a [place] [facility]rologist.  -we discussed how opiate medication can cause urinary hesitancy  -he feels like the urinary symptoms began a couple of weeks after he began lexapro.  -he states he " recently found out that his biological dad has had issues with his bladder.   -he had 2nd lumbar medial branch block done today with Dr. Ashlyn Gamble, we are working toward lumbar RFA..     Interval history 2020  -He has been sick 10 days ago,  had been on antibiotics and was not able to take his RA meds. He then felt better and then is now feeling worse again. Had been tested for COVID-19  -how his whole house is not feeling well.   -he is off of his RA meds, and so his pain is worse because he doesn't feel well, he has a really deep cough.  -having 2nd LMBB next week on the left side, then plan to do bilateral lumbar RFA  -he can't sleep due to body aches and then his back pain is worse.   -recently seen by psychiatry for first time this week, placed on escitalopram for depression/anxiety and hydroxyzine for panic/anxiety PRN, Les tells me that the psychiatrist wants him to stop using the clonazepam.     Interval history 6/10/2020  -having bilateral low back pain that radiates into the groin at times  -having multiple joint pain from RA, his RA flare is better as he is back on the Enbrel now  -he would love to get a new bed as he has issues getting comfortable in bed.   -he is having more left sided low back pain, worse with lumbar extension and extension and rotation. He is interested in pursing possible left sided lumbar RFA.    Interval 2020  -his 28 year old niece overdosed and  last Saturday, she had a 12,5 and 1 year old.   -he is going to her  today  -he did get his Enbrel yesterday  -he is going to see a new rheumatologist in July with the  [facility]    -he says his pain is now a bit better  -he has multiple joint pain from RA and chronic low back pain        At this point, the patient's participation with our multidisciplinary team includes:  The patient has been compliant with the program.    PT - has seen Cyndee White, none recently  Health Psych - worked with  Padilla Keene,  "last on 11/6/2018.  Working with Jonna Farrell PhD and been seen >8 times. Last visit with Santa was on 9/2/2020         Pain scores:    Pain intensity on average is 8-9 on a scale of 0-10.    Range is 8-9/10. (his pain can bring him to tears)  Pain right now is 9/10.   Pain is described as \"sharp, throbbing, stabbing, sometimes I feel like an ice pick is being stabbed in my lower back.\"  Pain is constant in nature      Current pain relevant medications:      -nortriptyline 50mg at bedtime (helpful)  -Enbrel 50mg/mL --OFF OF THIS RIGHT NOW DUE cost.   -ibuprofen 800mg TID PRN (using 3 times daily-helpful)  -Tylenol 500mg using 1000mg TID (unsure if helpful)  -Maxalt 10mg PRN migraine (helpful for migraine--he does have visual aura)  -naloxone nasal spray PRN for accidental opiate overdose  -chlorzoxazone 500mg TID PRN (not using regularly, not much with muscle spasms right now)  -Suboxone 8/2mg use 0.5 film under the tongue twice daily (helps some)      Other pertinent medications:   -clonazepam 1mg tab, may take 0.5-1mg BID PRN anxiety (helpful, has been using infrequently)  Fluoxetine 40mg every day (somewhat helpful, managed by psychiatry)  -quetiapine 150mg at bedtime        Previous Medications:   OPIATES: Oxycodone (helpful), Fentanyl (100mcg/hr patch helpful), hydrocodone (itching), Tramadol (not helpful), Suboxone (initially felt it was not helpful, but after he tapered off of it due to his preference prior to surgery, he now feels that it was helpful for his pain)  NSAIDS: ibuprofen (not helpful), Aleve (not helpful)  MUSCLE RELAXANTS: methocarbamol (somewhat helpful), Flexeril (somewhat helpful but caused severe urinary retention requiring catheterization), tizanidine (unsure if helpful), metaxalone (unsure), SOMA (helpful)  ANTI-MIGRAINE MEDS: Maxalt (helpful for migraine), Imitrex injection (somewhat helpful)  ANTI-DEPRESSANTS: Prozac (helpful), Cymbalta (felt weird on it), nortriptyline (unsure if " helpful), Buspar (unsure), Remeron (blacked out), bupropion (not helpful for smoking cessation)  SLEEP AIDS: Ambien (helpful)  ANTI-CONVULSANTS: gabapentin (felt odd on this medication, unsure of dose), Lyrica (not helpful for 4 months, unsure of dose), Topamax (not helpful, he felt weird on it)   TOPICALS: Lidocaine patches (not helpful), Voltaren gel (not helpful)  Other meds: Tylenol (unsure if helpful)        Other treatments have included:   Jailene Breen has been seen at a pain clinic in the past.  [facility]  PT: tried, not helpful  Chiropractic: tried, not helpful  Acupuncture: none  TENs Unit: tried, helpful         Injections:     -has had injections at outside clinics  -has had epidural injections for low back pain (one was helpful)  -he has had lumbar medial branch blocks at [facility] and he was going to have lumbar radiofrequency ablation (did not do this due to cost)  -4/3/2017 right LMBBs with Dr. Ashlyn Gamble (helpful)  -4/26/2017 right LMBBs with Dr. Ashlyn Gamble (helpful)  -5/31/2017 right L3, L4, L5 RFA with Dr. Ashlyn Gamble (good relief for over 6 months)  -3/15/2018 right L5 transforaminal MAKAYLA with Dr. Ashlyn Gamble (not helpful)  -5/10/2018 trigger point injections with Dr. Ashlyn Gamble(somewhat helpful).  -7/12/2018 Right L3, L4, L5 RFA with Dr. Ashlyn Gamble (helpful).  -9/20/2018 Left piriformis injections, bilateral gluteal trigger point with Dr. Gamble (helpful).  -1/31/2019 right L2-3, L3-4 and L4-5 facet joint injections with Dr. Ashlyn Gamble (somewhat helpful)  4/4/2019  right L3, L4, L5/sacral ala lumbar radiofrequency ablation with Dr. Gamble (helpful over right side)  6/28/2019 Bilateral facet joint injections at l4-5, L5-S1 with Dr. Holley (only helpful for 7 days)  -2/12/2020 right P8-E3-D0-sacral ALA RFA done with Dr. Ashlyn Gamble (helpful)  -8/26/2020 left L3-5 lumbar medial branch blocks #1 with Dr. Ashlyn Gamble (very helpful)  -11/11/2020 left L3-5  "lumbar medial branch blocks #2 with Dr. Ashlyn Gamble (too soon to tell, done today helpful)   -2/1/2021 right lumbar RFA L4-5 and L5-S1 with Dr. Ashlyn Gamble   (this \"helped a little bit\" and then he tweaked his back about 2 weeks after it was done and it wasn't helpful)            Side Effects: no side effect  Patient is using the medication as prescribed: YES    Medications:  Current Outpatient Medications   Medication Sig Dispense Refill     acetaminophen (TYLENOL) 500 MG tablet Take 1,000 mg by mouth every 6 hours as needed for mild pain (headache)       buprenorphine HCl-naloxone HCl (SUBOXONE) 8-2 MG per film Place 0.5 Film under the tongue 2 times daily Max 1 film per day. Fill 5/6/22 and start 5/8/22 to last 30 days for chronic pain 30 each 0     etanercept (ENBREL SURECLICK) 50 MG/ML autoinjector Inject 50 mg Subcutaneous once a week 1 mL 3     FLUoxetine (PROZAC) 20 MG capsule Take 3 capsules (60 mg) by mouth daily 90 capsule 1     folic acid (FOLVITE) 1 MG tablet TAKE THREE TABLETS BY MOUTH EVERY  tablet 2     liothyronine (CYTOMEL) 25 MCG tablet Take 1 tablet (25 mcg) by mouth daily 30 tablet 2     medical cannabis (Patient's own supply) See Admin Instructions (The purpose of this order is to document that the patient reports taking medical cannabis.  This is not a prescription, and is not used to certify that the patient has a qualifying medical condition.)     Pills PRN   Lozenges PRN       methotrexate sodium 2.5 MG TABS Take 4 tablets (10 mg) by mouth every 7 days Labs past due. Order in computer.Please have drawn, IMPORTANT 48 tablet 0     nicotine polacrilex (NICORETTE) 4 MG gum CHEW AND PARK 1 PIECE INSIDE CHEEK AS NEEDED 200 each 1     ondansetron (ZOFRAN-ODT) 4 MG ODT tab DISSOLVE TWO TABLETS BY MOUTH EVERY 12 HOURS AS NEEDED FOR NAUSEA       prazosin (MINIPRESS) 1 MG capsule Take 1 capsule (1 mg) by mouth At Bedtime 30 capsule 0     QUEtiapine (SEROQUEL) 50 MG tablet Take 1 tablet (50 " mg) by mouth At Bedtime May take additional 25-50mg (1/2 - 1 tablet) daily prn for anxiety/panic attacks 45 tablet 2     vitamin D2 (ERGOCALCIFEROL) 00566 units (1250 mcg) capsule TAKE 1 CAPSULE BY MOUTH EVERY MONDAY AND THURSDAY         Medical History: any changes in medical history since they were last seen? No    Social History:    Home situation: , has 2 grown children  Occupation/Schooling: currently unemployed, worked as a  (unemployed since 3/1/2017). 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  Uses nicotine gum  If yes, was cessation counseling offered?  None needed        Physical Exam:     Vital signs: There were no vitals taken for this visit.     Behavioral observations:  Awake, alert. Cooperative.   Pulm: respirations easy and unlabored. Able to speak in full sentences without SOB or cough noted.        [place] Prescription Monitoring Program:  Reviewed [facility] May 27, 2022- no concerning fills.  Susana GRANT, RN CNP, FNP  [facility]  [facility]          DIRE Score for selecting candidates for long term opioid analgesia for chronic pain:  Diagnosis  2  Intractablility  2  Risk    Psychological health  1    Chemical health  2    Reliability  2    Social support  2  Efficacy  1    Total DIRE Score = 12. Note that  7-13 predicts poor outcome (compliance and efficacy) from opioid prescribing; 14-21 predicts good outcome (compliance and efficacy)  from opioid prescribing.      Assessment:    1. Multiple joint pain  2. Rheumatoid arthritis involving multiple sites with positive rheumatoid factor  3. Ankylosing spondylitis of lumbosacral region  4. Chronic bilateral low back pain with right-sided sciatica  5. Lumbar facet joint pain  6. Lumbar  degenerative disc disease  7. Right hip pain  8. Uncomplicated opiate dependence with patient managed on Suboxone     9. encounter for long-term opiate analgesic use  10. PMHx includes: Panic attacks, back pain, arthritis, anxiety, ankylosing spondylitis  11. PSHx includes: Right knee surgery ×2, left foot surgery right knee arthroscopy      Plan:    1. Recommended to increase activity while pacing himself  2. Physical Therapy:  Let's do this once you are done with the PHP program  3. Clinical Health Psychologist: continue work with your psychiatrist and therapist  4. Diagnostic Studies:  None  5. Medication Management:    1. continueSuboxone 8/2mg films, use 0.5 film under the tongue Q 12 hours.   1. In future, would consider increase to 4mg TID  2. Oxycodone for pain flare since he is off of his biologics  3. Patient certified for medical cannabis 5/27/2022  6. Further procedures recommended: none at present  7. Recommendations to PCP: see above  8. Follow up:12 weeks in-person visit. Please call [phone] to make your follow-up appointment with me.       Face to face time: 23 minutes       Susana GRANT, SANDRA CNP, FNP  [facility]  [facility]

## 2022-05-27 NOTE — PATIENT INSTRUCTIONS
Plan:    Recommended to increase activity while pacing himself  Physical Therapy:  Let's do this once you are done with the [facility] program  Clinical Health Psychologist: keep working with your psychiatrist and therapist  Diagnostic Studies:  None  Medication Management:    continueSuboxone 8/2mg films, use 0.5 film under the tongue Q 12 hours.   In future, would consider increase to 4mg TID  Oxycodone for pain flare since he is off of his biologics  Patient certified for medical cannabis 5/27/2022  Further procedures recommended: none at present  Recommendations to PCP: see above  Follow up:12 weeks in-person visit. Please call [phone] to make your follow-up appointment with me.    ----------------------------------------------------------------  Clinic Number:  [phone]   Call with any questions about your care and for scheduling assistance.   Calls are returned Monday through Friday between 8 AM and 4:30 PM. We usually get back to you within 2 business days depending on the issue/request.    If we are prescribing your medications:  For opioid medication refills, call the clinic or send a [employer] message 7 days in advance.  Please include:  Name of requested medication  Name of the pharmacy.  For non-opioid medications, call your pharmacy directly to request a refill. Please allow 3-4 days to be processed.   Per [place] State Law:  All controlled substance prescriptions must be filled within 30 days of being written.    For those controlled substances allowing refills, pickup must occur within 30 days of last fill.      We believe regular attendance is key to your success in our program!    Any time you are unable to keep your appointment we ask that you call us at least 24 hours in advance to cancel.This will allow us to offer the appointment time to another patient.   Multiple missed appointments may lead to dismissal from the clinic.

## 2022-05-27 NOTE — TELEPHONE ENCOUNTER
Patient saw me today 5/27/2022 for a virtual video visit. This was covered today.    Susana GRANT, RN CNP, FNP  [facility]

## 2022-06-02 ENCOUNTER — MYC MEDICAL ADVICE (OUTPATIENT)
Dept: PALLIATIVE MEDICINE | Facility: CLINIC | Age: 57
End: 2022-06-02
Payer: COMMERCIAL

## 2022-06-02 NOTE — TELEPHONE ENCOUNTER
Per patient Socrat[employer] message:  From: Jamison Breen      Created: 6/2/2022 10:08 AM      Hello, I have a dumb question.  I have this pain 8n my chest, about an inch off center to left. How can I tell if it's just gas or something more serious?      _________________________________    Mychart sent to pt:  From: Cha Molina RN      Created: 6/2/2022 10:50 AM      Devante Swift,     Anytime anyone says 'I have chest pain' you need to go to the emergency room ASAP.      This isn't something JUANITA Ramon CNP can address.    Is this new? If this has been evaluated already and heart issues were already ruled out then you should send a message to your primary care provider.        SANDRA Eubanks-BSN  [facility]

## 2022-06-04 ENCOUNTER — HEALTH MAINTENANCE LETTER (OUTPATIENT)
Age: 57
End: 2022-06-04

## 2022-06-06 ENCOUNTER — MYC MEDICAL ADVICE (OUTPATIENT)
Dept: PALLIATIVE MEDICINE | Facility: CLINIC | Age: 57
End: 2022-06-06
Payer: COMMERCIAL

## 2022-06-06 ENCOUNTER — VIRTUAL VISIT (OUTPATIENT)
Dept: PSYCHIATRY | Facility: CLINIC | Age: 57
End: 2022-06-06
Attending: PHYSICAL MEDICINE & REHABILITATION
Payer: COMMERCIAL

## 2022-06-06 DIAGNOSIS — M54.59 LUMBAR FACET JOINT PAIN: ICD-10-CM

## 2022-06-06 DIAGNOSIS — M51.369 DDD (DEGENERATIVE DISC DISEASE), LUMBAR: ICD-10-CM

## 2022-06-06 DIAGNOSIS — M05.79 RHEUMATOID ARTHRITIS INVOLVING MULTIPLE SITES WITH POSITIVE RHEUMATOID FACTOR (H): ICD-10-CM

## 2022-06-06 DIAGNOSIS — M45.7 ANKYLOSING SPONDYLITIS OF LUMBOSACRAL REGION (H): ICD-10-CM

## 2022-06-06 DIAGNOSIS — F33.2 MAJOR DEPRESSIVE DISORDER, RECURRENT EPISODE, SEVERE WITH MIXED FEATURES (H): Primary | ICD-10-CM

## 2022-06-06 DIAGNOSIS — M25.50 MULTIPLE JOINT PAIN: ICD-10-CM

## 2022-06-06 PROCEDURE — 90837 PSYTX W PT 60 MINUTES: CPT | Mod: TEL | Performed by: STUDENT IN AN ORGANIZED HEALTH CARE EDUCATION/TRAINING PROGRAM

## 2022-06-06 NOTE — PROGRESS NOTES
TELEPHONE VISIT  Jailene Breen is a 56 year old pt. who is being evaluated via a billable telephone visit.      The patient has been notified of the following:    We have found that certain health care needs can be provided without the need for a physical exam. This service lets us provide the care you need with a short phone conversation. If a prescription is necessary we can send it directly to your pharmacy. If lab work is needed we can place an order for that and you can then stop by our lab to have the test done at a later time. Insurers are generally covering virtual visits as they would in-office visits so billing should not be different than normal.  If for some reason you do get billed incorrectly, you should contact the billing office to correct it and that number is in the AVS .    Patient has given verbal consent for a telephone visit?:  Yes   How would the pt like to obtain the AVS?:  Dr. Scribbles  AVS SmartPhrase [PsychAVS] has been placed in 'Patient Instructions':  Yes     Start Time:  9:05 AM        End Time:  9:58 AM        Clinician Contact & Progress Note  Department of Psychiatry & Behavioral Sciences  [facility]    Patient: Jailene Breen (1965)     MRN: [id number]  Date of Treatment:  Jun 6, 2022  Duration of Treatment: Start Time: 9:05 AM; End Time: 9:58 AM  Provider: Franklin Breen MD     People present:   Provider: Franklin Breen  Patient: Jailene Breen    Encounter Diagnosis   Name Primary?     Major depressive disorder, recurrent episode, severe with mixed features (H) Yes       Assessment (current symptoms):      PHQ 5/11/2022 5/16/2022 6/6/2022   PHQ-9 Total Score 16 14 15   Q9: Thoughts of better off dead/self-harm past 2 weeks Several days Not at all More than half the days   F/U: Thoughts of suicide or self-harm No - No   F/U: Self harm-plan - - -   F/U: Self-harm action - - -   F/U: Safety concerns No - No     JEFF-7 SCORE 5/9/2022 5/9/2022  "5/9/2022   Total Score - - -   Total Score - - 12 (moderate anxiety)   Total Score 12 12 12       Session Content:     - Discussed that this is our last visit. He has not been able to start looking for therapists yet  - Reviewed methods of finding therapy including [employer], contacting insurance, or calling individual therapists or systems  - Still interested in [facility], will message them again. Also discussed contacting , [facility], and [facility]    - He went to [place] in late May which was great.   - Knee pain from moving freezer was bad during the trip, was not able to go hiking at all. Did a lot of driving to see the sights.   - Went and saw his uncle (mother's brother) which went well, spent a lot of time talking  - Reports his uncle told him about his mother, that she had a lot of issues with her dad (Jamison's grandfather) who called her \"little Hitler\"  - Reports trip helped him realize \"my mom's been like this a long time, it's not just me\", uncle told Les his mom changed when his biological father \"left her\". Made Les feel validated, feel better.   - \"I know it's the best thing to do to keep her out of my life, she's just so toxic\". \"I don't know if I could ever see her again\". Reports his mother is taking up less space in his mind.   - Having nightmares about being hit by mom and step-dad, hiding from them. The rest of the day feels some fear, sadness, \"a hole in my life\". Happening a couple times per week. Has had these nightmares since childhood. Does feel like prazosin has been helpful for preventing nightmares.   - Reports he has not heard anything from family in [place] recently, indicating his mother is likely still alive     Treatment Plan:   1. Depression- be more active (doing things around the house), more motivation, confidence in self, go back to school (degree in psychology)    2. Relationship with bio parents- have a conversation with mom without fighting, give her a hug. Have deeper " "conversations with bio dad, feel more like a son.     3. Feelings of worthlessness, guilt, self-blame- Less shame, more future oriented     Mental Status Exam:  Alertness: alert  and oriented  Appearance: n/a phone  Behavior/Demeanor: cooperative, pleasant and calm, with n/a phone eye contact   Speech: normal  Language: intact. Preferred language identified as English.  Psychomotor: n/a phone  Mood: depressed and anxious  Affect: blunted and appropriate; was congruent to mood; was congruent to content  Thought Process/Associations: unremarkable  Thought Content:  Reports none;  Denies suicidal and violent ideation and delusions  -Suicidal ideation: denies SI, denies intent, and denies plan  -Homicidal Ideation: denies  Perception: Reports none;  Denies auditory hallucinations and visual hallucinations; intact    Insight: good  Judgment: good  Cognition: does  appear grossly intact    Billing for \"Interactive Complexity\"?    No    Franklin Breen MD    Answers for HPI/ROS submitted by the patient on 6/6/2022  If you checked off any problems, how difficult have these problems made it for you to do your work, take care of things at home, or get along with other people?: Very difficult  PHQ9 TOTAL SCORE: 15      "

## 2022-06-06 NOTE — PROGRESS NOTES
Jailene Breen is a 56 year old who has consented to receive services via billable video visit.      Pt will join video visit via: Mashable  If there are problems joining the visit, send backup video invite via: Text to preferred phone: [phone]    Originating Location (patient location): Patient's home  Distant Location (provider location): [facility] [place] [facility]    Will anyone else be joining the video visit? No    How would you prefer to obtain AVS?: MyChart  Video- Visit Details  Type of service:  video visit for medication management  Time of service:    Video Start Time: 1:26 PM               Video End Time: 2:10 PM    Mode of Communication:  Video Conference via AmWell  Consent:  Patient has given verbal consent for video visit?: Yes        [facility]  Psychiatry Clinic  PSYCHIATRY PROGRESS NOTE     CARE TEAM:    PCP- Aiden Resendez   Rheumatology- Raghu Parada MD  [facility]- Susana GRANT; Santa Farrell PsyD    Jailene Breen is a 56 year old patient who prefers the name Jamison and uses pronouns he, him, his.   PERTINENT BACKGROUND                                 [most recent eval 09/21/20]   Psych pertinent item history includes suicidal ideation, SIB [cutting], mutiple psychotropic trials , severe med reaction, psych hosp (<3), SUBSTANCE USE: alcohol, substance use treatment  and Major Medical Problems (Rheumatoid Arthritis and Ankylosing Spondylitis)    DIAGNOSES                       Major Depressive Disorder, recurrent, severe, without psychotic features  Generalized Anxiety Disorder, with panic    Chronic Pain  Ankylosing Spondylitis    ASSESSMENT                       Today, Jamison reports significant improvement in mood symptoms since last appointment, primarily since his trip to [place] where he visited family. He has notices some return in symptoms since return home and having  "more time to think about stressors. Believes that planning more \"fun\" activities and being more active/\"getting out\" more will help improve his symptoms. In addition, believes it would be helpful to engage with IOP group to have some additional support as he is terminating therapy with current psychiatry resident. Will place referral for both individual and group therapy at this time. Will not make any changes in medications given improvement in symptoms. If things were to worsen in the future would consider titration from fluoxetine to SNRI, as this class has not been adequately trialed.     [place]MP was checked today:  Indicates no medication misuse .    PLAN                                                                                   1) Medications:  -fluoxetine 60mg daily   -quetiapine 50mg at bedtime  -quetiapine 25-50mg as needed for anxiety and panic  -liothyronine 25mcg daily  -prazosin 1mg at bedtime     2) Therapy- none (terminated with individual resident therapist)    3) Next Due   Labs- AP monitoring labs due 9/2022  EKG- as needed  Rating scales- serial PHQ9s    4) Referrals  Therapy - IOP    5) RTC: 4-6 weeks with Dr. Corley    6) Crisis Numbers:   Provided routinely in AVS     After hours:  [phone]    INTERIM HISTORY        The patient reports good treatment adherence.  History was provided by the patient who was a good historian.     Since the last visit:   -\"I'm okay\" has been having some bad dreams lately, mostly due to childhood. Dreams are more scary than his memory of the events.   -Dreams happening nearly every night and will often wake him up.   -Has been having these dreams for quit a while.   -Does find that prazosin helps him fall asleep and does not have dreams those nights. But, will often forget to take it because will \"fall asleep unexpectedly.\"  -Went to [place] for a week, \"did have a good.\"  -Felt \"really good\" and \"really happy and excited\" while he was out west visiting his " "family and traveling. Vacation was \"a lot of work\" as they were \"driving pretty much every day.\" Looking at [facility] \"you were in awe.\" \"Fun\" to talk to uncle.  -He did talk to his uncle about his mom and \"nothing that I shared surprised them.\" They thought it made sense even though they didn't quite know what was going on.   -Happy and sad to see his uncle because probably the last time they will see him since he (uncle) has stage 4 lung cancer.  -Now that he's back home things have been \"a little harder\" since he has more time to think about his mom, but has been \"more active than I was\"  -Fixed some boards in his fence that he had been meaning to fix for the last several years.   -Still does have some \"emptiness\" feeling in his stomach now. Did not feel the emptiness in his stomach while he was in [place]. Feels like if he was more active and \"planning fun stuff,\" \"getting out,\" and being more active.   -Still in a lot of pain while in [place]. Every time he got out of the car or sitting down for a while \"it hurt\" but \"was worth it.\" Also, didn't want pain to stop us from doing what they wanted to do.    RECENT PSYCH ROS:   Depression: depressed mood, excessive guilt and indecisiveness   Elevated:  none  Psychosis:  none  Anxiety:  as above  Trauma Related:  none  Dysregulation:  none  Eating Disorder: no  Other: no    Adverse Effects:  denies  Pertinent Negative Symptoms: No suicidal ideation, violent ideation, aggression, psychosis, hallucinations, delusions, otto and hypomania    Recent Substance Use:     Alcohol- none since , used to attend AA meetings prior to COVID.  Tobacco- denies  Caffeine- yes  Cannabis- medical cannabis  Opioids- as prescribed           Narcan Kit- prescribed   Other illicit drugs- none    FAMILY and SOCIAL HISTORY                                      [per patient report]            Family Hx:  Mom - depression (since  when sister )    Social Hx:  Financial/ Work- " "currently attending college at [employer], is hoping to start school at [employer] for a degree in psychology after being unable to work due to diagnosis of rheumatoid arthritis and ankylosing spondylitis  Partner/ -  in 1992.  Children- 25 and 27 year old kids      Living situation- [place], house with wife and 2 kids      Social/ Spiritual Support- Talks to  every two weeks for lunch, but \"doesn't really have friends\". Family is supportive.      PSYCH and SUBSTANCE USE Critical Summary Points since July 2020 September - started escitalopram  November - increased escitalopram 20mg daily  February 2021 - stopped hydroxyzine; started trazodone 50mg at bedtime and 25mg daily as needed for anxiety  March/April - Patient hospitalized started on Abilify, titrated to 5mg daily; escitalopram switched to fluoxetine 40mg daily; started prazosin 1mg at bedtime. Pain management switched oxycodone to Suboxone and started medical marijuana.  May - patient still taking hydroxyzine, discontinued today.  June - patient hospitalized (6/15/21) discontinued Abilify and trazodone, started on quetiapine & titrated to 100mg at bedtime and 25mg as needed for sleep.   August - increase quetiapine 150mg   November - discontinued quetiapine 150mg (patient ran out of medications and did not take for several weeks).  December - patient restarted quetiapine 50mg at bedtime   March - increased fluoxetine 60mg      Medication  Dose   (mg) Effect  Dates of Use   fluoxetine 40 Felt like was initially helpful 2016 - 2017   sertraline 100 Effective initially, eventually self discontinued as not helpful 2018 - 7/2020   duloxetine 30 BID Used to treat nerve pain; felt weird on it 2017   bupropion  BID ineffective 2017 - 2019   nortriptyline 50 For pain 2017 -  3/2019   mirtazapine   Dissociated for entire first week after taking 2012   trazodone 50   2013             hydroxyzine 25 QID prn For anxiety and panic " attacks; not effective for severe anxiety 2015 - 2017   buspirone 15 TID   2016 - 2017   gabapentin 100 TID   2013 - 2016   alprazolam 0.5 TID For anxiety 5425-6052   diazepam 2 BID For anxiety 2016   lorazepam 1 TID For anxiety 2016   clonazepam 1 For anxiety 2016 - present      MEDICAL HISTORY  and ALLERGY     ALLERGIES: Mirtazapine, Hydrocodone, Ms contin [morphine], and Remeron soltab     Patient Active Problem List   Diagnosis     CARDIOVASCULAR SCREENING; LDL GOAL LESS THAN 160     Obesity, Class I, BMI 30-34.9     Tear meniscus knee, right, initial encounter     Rheumatoid arthritis involving multiple sites, unspecified rheumatoid factor presence     Chronic pain     Right-sided thoracic back pain, unspecified chronicity     Generalized anxiety disorder     Panic attack     Ankylosing spondylitis (H)     Tobacco use disorder     Moderate major depression (H)     Immunocompromised (H)     Essential hypertension with goal blood pressure less than 140/90     Suicidal ideation     Insomnia due to other mental disorder     Major depressive disorder, recurrent episode, severe with mixed features (H)     Chronic back pain greater than 3 months duration     Contact dermatitis and eczema     Dermatitis     Drug dependence (H)     Encounter for screening colonoscopy     Esophageal reflux     Hematuria     Hyperlipidemia     Lumbar radiculopathy     Plantar fascial fibromatosis     Sprain of sacroiliac region     Overweight     Nonintractable migraine     Narcotic abuse, continuous (H)         MEDICAL REVIEW OF SYSTEMS     Contraception- none  A comprehensive review of systems was performed and is negative other than noted in the HPI.    MEDICATIONS          Current Outpatient Medications   Medication Sig Dispense Refill     acetaminophen (TYLENOL) 500 MG tablet Take 1,000 mg by mouth every 6 hours as needed for mild pain (headache)       etanercept (ENBREL SURECLICK) 50 MG/ML autoinjector Inject 50 mg Subcutaneous once  a week 1 mL 3     FLUoxetine (PROZAC) 20 MG capsule Take 3 capsules (60 mg) by mouth daily 90 capsule 1     folic acid (FOLVITE) 1 MG tablet TAKE THREE TABLETS BY MOUTH EVERY  tablet 2     liothyronine (CYTOMEL) 25 MCG tablet Take 1 tablet (25 mcg) by mouth daily 30 tablet 2     medical cannabis (Patient's own supply) See Admin Instructions (The purpose of this order is to document that the patient reports taking medical cannabis.  This is not a prescription, and is not used to certify that the patient has a qualifying medical condition.)     Pills PRN   Lozenges PRN       methotrexate sodium 2.5 MG TABS Take 4 tablets (10 mg) by mouth every 7 days Labs past due. Order in computer.Please have drawn, IMPORTANT 48 tablet 0     nicotine polacrilex (NICORETTE) 4 MG gum CHEW AND PARK 1 PIECE INSIDE CHEEK AS NEEDED 200 each 1     ondansetron (ZOFRAN-ODT) 4 MG ODT tab DISSOLVE TWO TABLETS BY MOUTH EVERY 12 HOURS AS NEEDED FOR NAUSEA       oxyCODONE (ROXICODONE) 5 MG tablet Take 1 tablet (5 mg) by mouth every 6 hours as needed for severe pain Max of 3/day. Fill/begin 6/8/2022. Short term script for acute pain flare 21 tablet 0     prazosin (MINIPRESS) 1 MG capsule Take 1 capsule (1 mg) by mouth At Bedtime 30 capsule 0     QUEtiapine (SEROQUEL) 50 MG tablet Take 1 tablet (50 mg) by mouth At Bedtime May take additional 25-50mg (1/2 - 1 tablet) daily prn for anxiety/panic attacks 45 tablet 2     vitamin D2 (ERGOCALCIFEROL) 64738 units (1250 mcg) capsule TAKE 1 CAPSULE BY MOUTH EVERY MONDAY AND THURSDAY       buprenorphine HCl-naloxone HCl (SUBOXONE) 8-2 MG per film Place 0.5 Film under the tongue 2 times daily Max 1 film per day. Fill 06/08/22 and start 06/08/22 to last 30 days for chronic pain 30 each 0     VITALS                     There were no vitals taken for this visit.   MENTAL STATUS EXAM         Alertness: alert  and oriented  Appearance: adequately groomed  Behavior/Demeanor: cooperative, with good  eye  contact   Speech: regular rate and rhythm  Language: intact  Psychomotor: normal or unremarkable  Mood: description consistent with euthymia   Affect: appropriate; congruent to: mood- yes, content- yes  Thought Process/Associations: unremarkable  Thought Content:  Reports none;  Denies suicidal ideation, violent ideation and delusions   Perception:  Reports none;  Denies auditory hallucinations and visual hallucinations  Insight: good  Judgment: good  Cognition: (6) oriented: time, person, and place  attention span: intact  concentration: intact  recent memory: intact  remote memory: intact  fund of knowledge: appropriate  Gait and Station: N/A (telehealth)    LABS and DATA     PHQ9 TODAY = not completed today  PHQ 5/11/2022 5/16/2022 6/6/2022   PHQ-9 Total Score 16 14 15   Q9: Thoughts of better off dead/self-harm past 2 weeks Several days Not at all More than half the days   F/U: Thoughts of suicide or self-harm No - No   F/U: Self harm-plan - - -   F/U: Self-harm action - - -   F/U: Safety concerns No - No       Recent Labs   Lab Test 10/11/21  1123 06/16/21  0741   CR 0.91 1.09   GFRESTIMATED >90 77     Recent Labs   Lab Test 10/11/21  1123 06/16/21  0741   AST 37 23   ALT 47 33   ALKPHOS 102 88     Recent Labs   Lab Test 10/11/21  1123 06/16/21  0741   GLC 97 92   A1C 5.2  --      Recent Labs   Lab Test 10/11/21  1123   CHOL 209*   TRIG 225*   *   HDL 30*     Recent Labs   Lab Test 10/11/21  1123 06/16/21  0741   AST 37 23   ALT 47 33   ALKPHOS 102 88     Recent Labs   Lab Test 10/11/21  1123 06/16/21  0741 03/21/21  0808 03/17/21  0748   WBC 7.0 6.1 6.8 8.3   ANEU  --   --  3.7 4.4   HGB 16.2 14.6 15.6 15.0    166 246 211     PSYCHOTROPIC DRUG INTERACTIONS   Increased risk of QTc prolongation: fluoxetine, quetiapine, Suboxone  Increased risk of serotonin syndrome: fluoxetine, Suboxone  Increased risk of CNS/respiratory depression: Suboxone, quetiapine    MANAGEMENT:  Monitoring for adverse  effects and patient is aware of risks    RISK STATEMENT for SAFETY     Today, Jamison Breen does not report any suicidal ideation and does not appear to be an imminent risk to self or others.    SUICIDE RISK ASSESSMENT-  Risk factors for self-harm: previous suicide attempt, recent psych inpt , financial/legal stress, significant pain and takes opiates.  Mitigating factors: h/o seeking help , commitment to family and participation in DBT or day program.  The patient does not appear to be at imminent risk for self-harm, hospitalization is recommended which the pt does not agree to. No hospitalization will be arranged. Safety plan reviewed today including contacting family, friends (), day treatment team, clinic, or crisis line.    TREATMENT RISK STATEMENT:  The risks, benefits, alternatives and potential adverse effects have been discussed and are understood by the pt. The pt understands the risks of using street drugs or alcohol. There are no medical contraindications, the pt agrees to treatment with the ability to do so. The pt knows to call the clinic for any problems or to access emergency care if needed.  Medical and substance use concerns are documented above.  Psychotropic drug interaction check was done, including changes made today.    PROVIDER: Slim Sage MD    Patient was staffed with my attending Dr. Ball who will sign the note.  Supervisor is Dr. Downing.  I directly participated in the patient interview with the resident and discussed the key portions of the service with the resident, including the mental status examination and developing the plan of care. I reviewed key portions of the history with the resident. I agree with the findings and plan as documented in this note.   Ziggy Ball MD

## 2022-06-06 NOTE — TELEPHONE ENCOUNTER
Per patient myChart message:  From: Jamison Breen      Created: 6/6/2022 12:32 PM      Hi, could you please refill one more week of oxycodone.  I have started my enbrel but this is a really bad flare up and I'm hurting real bad. Please send to [facility] in [place], thank you.         Per JUANITA Ramon CNP's 5/27/22 visit:  1. Oxycodone for pain flare since he is off of his biologics    5/27/22: 7 day oxycodone refill provided.      Routed to provider.     SANDRA Eubanks-BSN  [facility]-Hardeep Eubanks RN-KERLINEN  [facility] Steele

## 2022-06-07 ENCOUNTER — MYC REFILL (OUTPATIENT)
Dept: PALLIATIVE MEDICINE | Facility: CLINIC | Age: 57
End: 2022-06-07
Payer: COMMERCIAL

## 2022-06-07 DIAGNOSIS — M05.79 RHEUMATOID ARTHRITIS INVOLVING MULTIPLE SITES WITH POSITIVE RHEUMATOID FACTOR (H): ICD-10-CM

## 2022-06-07 DIAGNOSIS — G89.29 CHRONIC BILATERAL LOW BACK PAIN WITH RIGHT-SIDED SCIATICA: ICD-10-CM

## 2022-06-07 DIAGNOSIS — M51.369 DDD (DEGENERATIVE DISC DISEASE), LUMBAR: ICD-10-CM

## 2022-06-07 DIAGNOSIS — M54.59 LUMBAR FACET JOINT PAIN: ICD-10-CM

## 2022-06-07 DIAGNOSIS — M54.41 CHRONIC BILATERAL LOW BACK PAIN WITH RIGHT-SIDED SCIATICA: ICD-10-CM

## 2022-06-07 DIAGNOSIS — M25.551 HIP PAIN, RIGHT: ICD-10-CM

## 2022-06-07 DIAGNOSIS — F11.20 UNCOMPLICATED OPIOID DEPENDENCE (H): ICD-10-CM

## 2022-06-07 DIAGNOSIS — M45.7 ANKYLOSING SPONDYLITIS OF LUMBOSACRAL REGION (H): ICD-10-CM

## 2022-06-07 RX ORDER — BUPRENORPHINE AND NALOXONE 8; 2 MG/1; MG/1
0.5 FILM, SOLUBLE BUCCAL; SUBLINGUAL 2 TIMES DAILY
Qty: 30 EACH | Refills: 0 | Status: SHIPPED | OUTPATIENT
Start: 2022-06-07 | End: 2022-06-08

## 2022-06-07 NOTE — TELEPHONE ENCOUNTER
Signed Prescriptions:                        Disp   Refills    buprenorphine HCl-naloxone HCl (SUBOXONE) *30 each0        Sig: Place 0.5 Film under the tongue 2 times daily Max 1           film per day. Fill 06/07/22 and start 06/07/22 to           last 30 days for chronic pain  Authorizing Provider: SUSANA PETTY    Reviewed [place]  June 7, 2022- no concerning fills.    Susana GRANT, RN CNP, FNP  [facility]  [facility]

## 2022-06-07 NOTE — TELEPHONE ENCOUNTER
Refills have been requested for the following medications:         buprenorphine HCl-naloxone HCl (SUBOXONE) 8-2 MG per film [Susana YOLANDA]     Preferred pharmacy: [id number] - [place] - [address]

## 2022-06-07 NOTE — TELEPHONE ENCOUNTER
Medication refill information reviewed.     Due date for buprenorphine HCl-naloxone HCl (SUBOXONE) 8-2 MG per film  is 06/07/22     Prescriptions prepped for review.     Will route to provider.

## 2022-06-07 NOTE — TELEPHONE ENCOUNTER
Received call from patient requesting refill(s) of buprenorphine HCl-naloxone HCl (SUBOXONE) 8-2 MG per film     Last dispensed from pharmacy on 05/08/22    Patient's last office/virtual visit by prescribing provider on 05/27/22  Next office/virtual appointment scheduled for None    Last urine drug screen date 06/15/21  Current opioid agreement on file (completed within the last year) Yes Date of opioid agreement: 05/06/22    E-prescribe to   [id number] - [place] - [address]  [address] [address]  Phone: [phone] Fax: [phone]    Will route to nursing [place] for review and preparation of prescription(s).       Joy Kessler MA  [facility] [place]

## 2022-06-08 ENCOUNTER — VIRTUAL VISIT (OUTPATIENT)
Dept: PSYCHIATRY | Facility: CLINIC | Age: 57
End: 2022-06-08
Attending: PSYCHIATRY & NEUROLOGY
Payer: COMMERCIAL

## 2022-06-08 ENCOUNTER — MYC MEDICAL ADVICE (OUTPATIENT)
Dept: PALLIATIVE MEDICINE | Facility: CLINIC | Age: 57
End: 2022-06-08
Payer: COMMERCIAL

## 2022-06-08 DIAGNOSIS — M45.7 ANKYLOSING SPONDYLITIS OF LUMBOSACRAL REGION (H): ICD-10-CM

## 2022-06-08 DIAGNOSIS — F11.20 UNCOMPLICATED OPIOID DEPENDENCE (H): ICD-10-CM

## 2022-06-08 DIAGNOSIS — M05.79 RHEUMATOID ARTHRITIS INVOLVING MULTIPLE SITES WITH POSITIVE RHEUMATOID FACTOR (H): ICD-10-CM

## 2022-06-08 DIAGNOSIS — M51.369 DDD (DEGENERATIVE DISC DISEASE), LUMBAR: ICD-10-CM

## 2022-06-08 DIAGNOSIS — M25.551 HIP PAIN, RIGHT: ICD-10-CM

## 2022-06-08 DIAGNOSIS — F41.9 ANXIETY: ICD-10-CM

## 2022-06-08 DIAGNOSIS — M54.41 CHRONIC BILATERAL LOW BACK PAIN WITH RIGHT-SIDED SCIATICA: ICD-10-CM

## 2022-06-08 DIAGNOSIS — F32.A DEPRESSION, UNSPECIFIED DEPRESSION TYPE: ICD-10-CM

## 2022-06-08 DIAGNOSIS — F51.05 INSOMNIA DUE TO OTHER MENTAL DISORDER: ICD-10-CM

## 2022-06-08 DIAGNOSIS — F99 INSOMNIA DUE TO OTHER MENTAL DISORDER: ICD-10-CM

## 2022-06-08 DIAGNOSIS — G89.29 CHRONIC BILATERAL LOW BACK PAIN WITH RIGHT-SIDED SCIATICA: ICD-10-CM

## 2022-06-08 DIAGNOSIS — M54.59 LUMBAR FACET JOINT PAIN: ICD-10-CM

## 2022-06-08 PROCEDURE — 99214 OFFICE O/P EST MOD 30 MIN: CPT | Mod: GC | Performed by: STUDENT IN AN ORGANIZED HEALTH CARE EDUCATION/TRAINING PROGRAM

## 2022-06-08 RX ORDER — QUETIAPINE FUMARATE 50 MG/1
50 TABLET, FILM COATED ORAL AT BEDTIME
Qty: 45 TABLET | Refills: 2 | Status: SHIPPED | OUTPATIENT
Start: 2022-06-08 | End: 2022-07-14

## 2022-06-08 RX ORDER — OXYCODONE HYDROCHLORIDE 5 MG/1
5 TABLET ORAL EVERY 6 HOURS PRN
Qty: 21 TABLET | Refills: 0 | Status: SHIPPED | OUTPATIENT
Start: 2022-06-08 | End: 2022-08-31

## 2022-06-08 RX ORDER — BUPRENORPHINE AND NALOXONE 8; 2 MG/1; MG/1
0.5 FILM, SOLUBLE BUCCAL; SUBLINGUAL 2 TIMES DAILY
Qty: 30 EACH | Refills: 0 | Status: SHIPPED | OUTPATIENT
Start: 2022-06-08 | End: 2022-07-07

## 2022-06-08 RX ORDER — PRAZOSIN HYDROCHLORIDE 1 MG/1
1 CAPSULE ORAL AT BEDTIME
Qty: 30 CAPSULE | Refills: 1 | Status: SHIPPED | OUTPATIENT
Start: 2022-06-08 | End: 2022-07-13

## 2022-06-08 RX ORDER — LIOTHYRONINE SODIUM 25 UG/1
25 TABLET ORAL DAILY
Qty: 30 TABLET | Refills: 2 | Status: SHIPPED | OUTPATIENT
Start: 2022-06-08 | End: 2022-07-14

## 2022-06-08 NOTE — TELEPHONE ENCOUNTER
Refill of oxyCODONE (ROXICODONE) 5 MG tablet #21 pended per patient request to [facility] in [place]. Routing to provider to review.     KERLINE RivasN, RN  Care Coordinator  [facility] [place]

## 2022-06-08 NOTE — TELEPHONE ENCOUNTER
buprenorphine HCl-naloxone HCl (SUBOXONE) 8-2 MG per film pended for [facility] in [place] per patient request. Canceled at [facility].    KERLINE RivasN, RN  Care Coordinator  [facility] [place]

## 2022-06-08 NOTE — TELEPHONE ENCOUNTER
Shahram from patient:    Devante Meza, could you please tell me what discount [place] you used for the suboxone at [facility] where the cost was around $38  for the prescription?

## 2022-06-08 NOTE — TELEPHONE ENCOUNTER
Signed Prescriptions:                        Disp   Refills    oxyCODONE (ROXICODONE) 5 MG tablet         21 tab*0        Sig: Take 1 tablet (5 mg) by mouth every 6 hours as needed           for severe pain Max of 3/day. Fill/begin           6/8/2022. Short term script for acute pain flare  Authorizing Provider: SUSANA PETTY    Reviewed [facility] June 8, 2022- no concerning fills.    Susana Petty APRN, RN CNP, FNP  [facility]  [facility] [place]

## 2022-06-08 NOTE — TELEPHONE ENCOUNTER
Spoke to patient. Advised prescription was sent to pharmacy. Confirmed pharmacy.     Patient stated understanding.    Patient did not ask the below question. Will route again to provider.     [facility] [place]

## 2022-06-08 NOTE — TELEPHONE ENCOUNTER
Spoke to patient to advise a prescription was sent to pharmacy.   Patient asked the status of this request. Would like a response urgently.     Routing to nursing.     [facility] [place]

## 2022-06-08 NOTE — TELEPHONE ENCOUNTER
Signed Prescriptions:                        Disp   Refills    buprenorphine HCl-naloxone HCl (SUBOXONE) *30 each0        Sig: Place 0.5 Film under the tongue 2 times daily Max 1           film per day. Fill 06/08/22 and start 06/08/22 to           last 30 days for chronic pain  Authorizing Provider: SUSANA PETTY    Reviewed [place]  June 8, 2022- no concerning fills.    Susana GRANT, RN CNP, FNP  [facility]  [facility]

## 2022-06-08 NOTE — PATIENT INSTRUCTIONS
Nice to see you today Les. As we discussed:   - Referral placed for IOP group therapy. If you do not hear from them in 1 week call [phone]      **For crisis resources, please see the information at the end of this document**   Patient Education    Thank you for coming to the [facility] [place] [facility].     Lab Testing:  If you had lab testing today and your results are reassuring or normal they will be mailed to you or sent through VSHORE within 7 days. If the lab tests need quick action we will call you with the results. The phone number we will call with results is # [phone]. If this is not the best number please call our clinic and change the number.     Medication Refills:  If you need any refills please call your pharmacy and they will contact us. Our fax number for refills is [phone].   Three business days of notice are needed for general medication refill requests.   Five business days of notice are needed for controlled substance refill requests.   If you need to change to a different pharmacy, please contact the new pharmacy directly. The new pharmacy will help you get your medications transferred.     Contact Us:  Please call [phone] during business hours (8-5:00 M-F).   If you have medication related questions after clinic hours, or on the weekend, please call [phone].     Financial Assistance [phone]   Medical Records [phone]       MENTAL HEALTH CRISIS RESOURCES:  For a emergency help, please call 911 or go to the nearest Emergency Department.     Emergency Walk-In Options:   EmPATH Unit @ [facility] ([place]): [phone] - Specialized mental health emergency area designed to be calming  [facility] ([place]): [phone]  [facility] ([place]): [phone]  [facility] ([place]): [phone]    [facility] Crisis Information:   [place]: [phone]  [place]:  [phone]  [place] (COPE) - Adult: [phone]     Child: [phone]  [place] - Adult: [phone]     Child: [phone]  [place]: [phone]  List of all [facility] resources:   https://[place].gov/dhs/people-we-serve/adults/health-care/mental-health/resources/crisis-contacts.jsp    National Crisis Information:   Crisis Text Line: Text  [place]  to 563538  National Suicide Prevention Lifeline: 9-853-893-TALK (1-318.642.4893)       For online chat options, visit https://suicidepreventionlifeline.org/chat/  Poison Control Center: 1-312.597.8609  Trans Lifeline: 0-524-815-7254 - Hotline for transgender people of all ages  The Tu Project: 1-134-427-4955 - Hotline for LGBT youth     For Non-Emergency Support:   Fast Tracker: Mental Health & Substance Use Disorder Resources -   https://www.[employer].org/

## 2022-07-06 ENCOUNTER — MYC MEDICAL ADVICE (OUTPATIENT)
Dept: PALLIATIVE MEDICINE | Facility: CLINIC | Age: 57
End: 2022-07-06

## 2022-07-06 DIAGNOSIS — G89.29 CHRONIC BILATERAL LOW BACK PAIN WITH RIGHT-SIDED SCIATICA: ICD-10-CM

## 2022-07-06 DIAGNOSIS — F11.20 UNCOMPLICATED OPIOID DEPENDENCE (H): ICD-10-CM

## 2022-07-06 DIAGNOSIS — M54.59 LUMBAR FACET JOINT PAIN: ICD-10-CM

## 2022-07-06 DIAGNOSIS — M25.551 HIP PAIN, RIGHT: ICD-10-CM

## 2022-07-06 DIAGNOSIS — M51.369 DDD (DEGENERATIVE DISC DISEASE), LUMBAR: ICD-10-CM

## 2022-07-06 DIAGNOSIS — M54.41 CHRONIC BILATERAL LOW BACK PAIN WITH RIGHT-SIDED SCIATICA: ICD-10-CM

## 2022-07-06 DIAGNOSIS — M45.7 ANKYLOSING SPONDYLITIS OF LUMBOSACRAL REGION (H): ICD-10-CM

## 2022-07-06 DIAGNOSIS — M05.79 RHEUMATOID ARTHRITIS INVOLVING MULTIPLE SITES WITH POSITIVE RHEUMATOID FACTOR (H): ICD-10-CM

## 2022-07-07 NOTE — TELEPHONE ENCOUNTER
Patient requesting refill(s) of buprenorphine HCl-naloxone HCl (SUBOXONE) 8-2 MG per film    Last dispensed from pharmacy on 6/8/22    Patient's last office/virtual visit by prescribing provider on 5/27/22  Next office/virtual appointment scheduled for None     Last urine drug screen date 3/16/21  Current opioid agreement on file (completed within the last year) Yes Date of opioid agreement: 5/10/22    E-prescribe to [id number] - [place]     Will route to nursing [place] for review and preparation of prescription(s).

## 2022-07-07 NOTE — TELEPHONE ENCOUNTER
Please process a refill of buprenorphine HCl-naloxone HCl (SUBOXONE) 8-2 MG per film to     [id number] - [place] - [id number]  [address] [address]  Phone: [phone] Fax: [phone]

## 2022-07-08 RX ORDER — BUPRENORPHINE AND NALOXONE 8; 2 MG/1; MG/1
0.5 FILM, SOLUBLE BUCCAL; SUBLINGUAL 2 TIMES DAILY
Qty: 30 EACH | Refills: 0 | Status: SHIPPED | OUTPATIENT
Start: 2022-07-08 | End: 2022-08-10

## 2022-07-08 NOTE — TELEPHONE ENCOUNTER
Signed Prescriptions:                        Disp   Refills    buprenorphine HCl-naloxone HCl (SUBOXONE) *30 each0        Sig: Place 0.5 Film under the tongue 2 times daily Max 1           film per day. Fill 07/08/22 and start 07/08/22 to           last 30 days for chronic pain  Authorizing Provider: SUSANA PETTY    Reviewed [place]  July 8, 2022- no concerning fills.    Susana GRANT, RN CNP, FNP  [facility]  [facility]

## 2022-07-08 NOTE — TELEPHONE ENCOUNTER
Medication refill information reviewed.     Due date for buprenorphine HCl-naloxone HCl (SUBOXONE) 8-2 MG per film is 07/08/22     Prescriptions prepped for review.     Will route to provider.

## 2022-07-14 ENCOUNTER — VIRTUAL VISIT (OUTPATIENT)
Dept: PSYCHIATRY | Facility: CLINIC | Age: 57
End: 2022-07-14
Attending: PSYCHIATRY & NEUROLOGY
Payer: COMMERCIAL

## 2022-07-14 DIAGNOSIS — F32.A DEPRESSION, UNSPECIFIED DEPRESSION TYPE: ICD-10-CM

## 2022-07-14 DIAGNOSIS — F51.05 INSOMNIA DUE TO OTHER MENTAL DISORDER: ICD-10-CM

## 2022-07-14 DIAGNOSIS — F41.9 ANXIETY: ICD-10-CM

## 2022-07-14 DIAGNOSIS — F99 INSOMNIA DUE TO OTHER MENTAL DISORDER: ICD-10-CM

## 2022-07-14 PROCEDURE — 90792 PSYCH DIAG EVAL W/MED SRVCS: CPT | Mod: GT | Performed by: STUDENT IN AN ORGANIZED HEALTH CARE EDUCATION/TRAINING PROGRAM

## 2022-07-14 RX ORDER — LIOTHYRONINE SODIUM 25 UG/1
25 TABLET ORAL DAILY
Qty: 30 TABLET | Refills: 2 | Status: SHIPPED | OUTPATIENT
Start: 2022-07-14 | End: 2022-08-15

## 2022-07-14 RX ORDER — PRAZOSIN HYDROCHLORIDE 1 MG/1
1 CAPSULE ORAL AT BEDTIME
Qty: 30 CAPSULE | Refills: 1 | Status: SHIPPED | OUTPATIENT
Start: 2022-07-14 | End: 2022-08-14

## 2022-07-14 RX ORDER — QUETIAPINE FUMARATE 50 MG/1
50 TABLET, FILM COATED ORAL AT BEDTIME
Qty: 45 TABLET | Refills: 2 | Status: SHIPPED | OUTPATIENT
Start: 2022-07-14 | End: 2022-08-15

## 2022-07-14 ASSESSMENT — ANXIETY QUESTIONNAIRES
2. NOT BEING ABLE TO STOP OR CONTROL WORRYING: SEVERAL DAYS
4. TROUBLE RELAXING: NEARLY EVERY DAY
5. BEING SO RESTLESS THAT IT IS HARD TO SIT STILL: MORE THAN HALF THE DAYS
6. BECOMING EASILY ANNOYED OR IRRITABLE: SEVERAL DAYS
7. FEELING AFRAID AS IF SOMETHING AWFUL MIGHT HAPPEN: SEVERAL DAYS
1. FEELING NERVOUS, ANXIOUS, OR ON EDGE: SEVERAL DAYS
GAD7 TOTAL SCORE: 11
7. FEELING AFRAID AS IF SOMETHING AWFUL MIGHT HAPPEN: SEVERAL DAYS
8. IF YOU CHECKED OFF ANY PROBLEMS, HOW DIFFICULT HAVE THESE MADE IT FOR YOU TO DO YOUR WORK, TAKE CARE OF THINGS AT HOME, OR GET ALONG WITH OTHER PEOPLE?: VERY DIFFICULT
3. WORRYING TOO MUCH ABOUT DIFFERENT THINGS: MORE THAN HALF THE DAYS
GAD7 TOTAL SCORE: 11
GAD7 TOTAL SCORE: 11

## 2022-07-14 ASSESSMENT — PATIENT HEALTH QUESTIONNAIRE - PHQ9
10. IF YOU CHECKED OFF ANY PROBLEMS, HOW DIFFICULT HAVE THESE PROBLEMS MADE IT FOR YOU TO DO YOUR WORK, TAKE CARE OF THINGS AT HOME, OR GET ALONG WITH OTHER PEOPLE: VERY DIFFICULT
SUM OF ALL RESPONSES TO PHQ QUESTIONS 1-9: 16
SUM OF ALL RESPONSES TO PHQ QUESTIONS 1-9: 16

## 2022-07-14 NOTE — PATIENT INSTRUCTIONS
Please call [phone] to schedule for intensive outpatient program (IOP)    We did not make any medication changes today.         **For crisis resources, please see the information at the end of this document**   Patient Education    Thank you for coming to the [facility] [place] [facility].     Lab Testing:  If you had lab testing today and your results are reassuring or normal they will be mailed to you or sent through Ayannah within 7 days. If the lab tests need quick action we will call you with the results. The phone number we will call with results is # [phone]. If this is not the best number please call our clinic and change the number.     Medication Refills:  If you need any refills please call your pharmacy and they will contact us. Our fax number for refills is [phone].   Three business days of notice are needed for general medication refill requests.   Five business days of notice are needed for controlled substance refill requests.   If you need to change to a different pharmacy, please contact the new pharmacy directly. The new pharmacy will help you get your medications transferred.     Contact Us:  Please call [phone] during business hours (8-5:00 M-F).   If you have medication related questions after clinic hours, or on the weekend, please call [phone].     Financial Assistance [phone]   Medical Records [phone]       MENTAL HEALTH CRISIS RESOURCES:  For a emergency help, please call 911 or go to the nearest Emergency Department.     Emergency Walk-In Options:   EmPATH Unit @ [facility] ([place]): [phone] - Specialized mental health emergency area designed to be calming  [facility] ([place]): [phone]  [facility] ([place]): [phone]  [facility]): [phone]    [facility] Crisis Information:   [place]: [phone]  [place]:  [phone]  [place] (COPE) - Adult: [phone]     Child: [phone]  [place] - Adult: [phone]     Child: [phone]  [place]: [phone]  List of all [facility] resources:   https://[place].gov/dhs/people-we-serve/adults/health-care/mental-health/resources/crisis-contacts.jsp    National Crisis Information:   Crisis Text Line: Text  [place]  to 133424  National Suicide Prevention Lifeline: 8-342-213-TALK (1-538.449.7591)       For online chat options, visit https://suicidepreventionlifeline.org/chat/  Poison Control Center: 1-417.176.4807  Trans Lifeline: 6-133-031-4776 - Hotline for transgender people of all ages  The Tu Project: 8-411-959-4498 - Hotline for LGBT youth     For Non-Emergency Support:   Fast Tracker: Mental Health & Substance Use Disorder Resources -   https://www.[employer].org/

## 2022-07-14 NOTE — PROGRESS NOTES
Jailene Breen is a 56 year old who has consented to receive services via billable video visit.      Pt will join video visit via: [employer]  If there are problems joining the visit, send backup video invite via: Text to preferred phone: [phone]      Originating Location (patient location): Patient's home  Distant Location (provider location): [facility] [place] [facility]    Will anyone else be joining the video visit? No    How would you prefer to obtain AVS?: Shahram

## 2022-07-14 NOTE — PROGRESS NOTES
Video- Visit Details  Type of service:  video visit for medication management  Time of service:    Video Start Time:  9:10 AM        Video End Time:  10:00 AM  Reason for video visit:  Patient convenience   Originating Site (patient location):  [facility]- [place]   Location- Patient's home  Distant Site (provider location):  [facility]  Mode of Communication:  Video Conference via [employer]       [facility]  Psychiatry Clinic  TRANSFER of CARE DIAGNOSTIC ASSESSMENT     CARE TEAM:  PCP- Aiden Resendez    Psychotherapist- None     This person is a 56 year old who uses the name Les and pronouns he, him, his.      DIAGNOSIS     - Major Depressive Disorder, recurrent, severe, without psychotic features  - Generalized Anxiety Disorder, with panic     Medical Diagnoses:  -Chronic Pain  -Ankylosing Spondylitis  - R/O PTSD     ASSESSMENT     Mr. Breen reported that at the last visit in June no medication changes were made following improvements in his symptoms. Today he states that his mood symptoms including depressed mood, low energy, poor concentration /memory, feeling hopeless and feeling trapped remained the same. He describes poor sleep, states that quetiapine helps go him to sleep however, he has early awakenings and could not get adequate rest. His major stressors include chronic pain, and conflicts with his mom. We discussed his current use of prazosin as needed, and he agreed to take it as prescribed at bedtime to avoid rebound hypertension and optimize the medications's effects on his nightmares and sleep. He is not currently in therapy (the resident psychiatrist graduated). New referrals for individual and group therapy were made previously. He agreed to call to schedule for IOP. He denied suicidal ideation, any intent or plan today. If his symptoms worsen in the future. titration from fluoxetine to an SNRI can be considered. We will repeat his antipsychotic labs at  "the last visit. No medication side effects reported at this visit.     [place] review was not needed today.     PLAN                                                                                                                  1) Medications:  - Continue fluoxetine 60mg daily   - Continue quetiapine 50mg at bedtime  - Continue quetiapine 25-50mg as needed for anxiety and panic  - Continue liothyronine 25mcg daily  - Continue prazosin 1mg at bedtime (patient has been taking it as needed, agreed to continue as prescribed)    Other:  - On Suboxone     2) Therapy- none (terminated with individual resident therapist) insurance may  not cover, the referrals were made at the last visit     3) Next Due   Labs- AP monitoring labs due 9/2022 (will order them at the next visit)  EKG- as needed  Rating scales- serial PHQ9s     4) Referrals  Therapy - referral was made previously for IOP (patient was provided a phone number at this visit, he will follow up)  Shared a outside resource, [facility]ig[facility] Reyes, for spiritual support     5) RTC: 4 weeks     6) Crisis Numbers:   Provided routinely in AVS     After hours:  [phone]     PERTINENT BACKGROUND                                                    [most recent eval 07/13/22]     Originally, diagnosed with depression and anxiety in 2015 but first experienced symptoms of depression in middle school. He most recently had a significant worsening of symptoms in 2017 after he was diagnosed with rheumatoid arthritis, ankylosing spondylitis ended up with a knee injury and was no longer able to work or run which he used as his primary coping mechanism.    Pertinent Items Include: suicidal ideation, SIB [cutting], multiple psychotropic trials , severe med reaction, psych hosp (<3), SUBSTANCE USE: alcohol, substance use treatment  and Major Medical Problems (Rheumatoid Arthritis and Ankylosing Spondylitis)       SUBJECTIVE     Today:   - Reports that his mood symptoms \"are the same\" " "Since the last visit on 6/8/2022.  - Agrees that it would be helpful to engage with the IOP group to have some additional support as he has terminated the therapy with the current psychiatry resident  graduated)-  - Referrals for both individual and group therapy were made on the last visit  - Reports that no changes were made to his medications at the last visit, given the improvement in symptoms  - Plan was if his symptoms worsen in the future, titration from fluoxetine to SNRI as this class has not been adequately trialed.   - Described poor sleep; although Quetiapine helps to go to sleep, It is \"hard to stay asleep.\"   - Woke up this morning at 2:30 am; he had \"a lot in his mind.\"  - Shared his ongoing conflict with his mom, \"someone looks like mom triggers emotions,\" \"having mom in my life is not good, and same time she is my mom.\"  - Stated that mom \"does not want to do anything with him,\" makes hurtful comments, and \"never admits she is wrong.\"  - Conversations with mom \"start fine, then she brings up the last argument.\"   - Has been taking prazosin as a PRN and agreed to take it as prescribed, we discussed rebound HTN and prazosin's beneficial effects on nightmares and sleep   - Describes depressive symptoms (see below), remained the same  - Denies suicidal ideation, intent, or plan today, denies any safety concerns (noted that patient responded \"several days to Q9 in PHQ-9 of Thoughts of better off dead/self-harm past two weeks)    Recent Psych Symptoms:   Depression:  depressed mood, low energy, poor concentration /memory, feeling hopeless and feeling trapped  Elevated:  none  Psychosis:  none  Anxiety:  excessive worry and nervous/overwhelmed  Trauma Related:  nightmares, flashbacks, negative beliefs / emotions and hypervigilance, nightmares about child trauma,   Sleep: Yes, 4 hrs, goes to bed whenever he can, often falls asleep on the couch     Adverse Effects:  denies  Pertinent Negative Symptoms: No " "suicidal ideation, violent ideation, aggression, psychosis, hallucinations, delusions, otto and hypomania    Recent Substance Use:     Alcohol- none since , used to attend AA meetings prior to COVID.  Tobacco- denies  Caffeine- no,    Cannabis- denies     Opioids- as prescribed           Narcan Kit- prescribed   Other illicit drugs- none     FAMILY and SOCIAL HISTORY                                 pt reported     Family Hx:  Mom - depression (since  when sister )     Social Hx:  Financial/ Work- planning [employer] for a degree in psychology after being unable to work due to diagnosis of rheumatoid arthritis and ankylosing spondylitis, on disability   Partner/ -  in .  Children- 26 and 28 year old kids   2  Living situation- [place], house with wife and daughter 27 yo at home, son is in [place], three dogs    Social/ Spiritual Support- Talks to  every two weeks for lunch, but \"doesn't really have friends\". Family is supportive.    Walks, hiking,     PAST PSYCHIATRIC HISTORY     SIB-   Suicide Attempt [#, most recent]- No   Suicidal Ideation Hx- No   Violence/Aggression Hx- No, not now  Psychosis Hx- No   Eating Disorder Hx- No   Psych Hosp [#, most recent]- Yes, 3-4 times, last one a few months, 10 days longest  Commitment- No   TMS/ECT- No   Outpatient Programs - Yes, CD treatment at [facility], in patient and outpatient program     PAST MED TRIALS     Medication  Dose   (mg) Effect  Dates of Use   Fluoxetine 40 Felt like was initially helpful  -    Sertraline 100 Effective initially, eventually self discontinued as not helpful 2020   Duloxetine 30 BID Used to treat nerve pain; felt weird on it    Bupropion  BID ineffective  -    Nortriptyline 50 For pain 2017 -  3/2019   Tirtazapine   Dissociated for entire first week after taking    Trazodone 50                hydroxyzine 25 QID prn For anxiety and panic attacks; not effective for " severe anxiety 2015 - 2017   Buspirone 15 TID   2016 - 2017   Gabapentin 100 TID   2013 - 2016   Alprazolam 0.5 TID For anxiety 0690-7408   Diazepam 2 BID For anxiety 2016   Lorazepam 1 TID For anxiety 2016   Clonazepam 1 For anxiety 2016 - present       PSYCH CRITICAL SUMMARY POINTS         From Dr. Sage's Notes on 6/8/2022:    9/20 - started escitalopram  11/20 - increased escitalopram 20mg daily  2/21 - stopped hydroxyzine; started trazodone 50mg at bedtime and 25mg daily as needed for anxiety  3/21 - 4/22 - hospitalized started on Abilify, titrated to 5mg daily; escitalopram switched to fluoxetine 40mg daily; started prazosin 1mg at bedtime. Pain management switched oxycodone to Suboxone and started medical marijuana.  5/21  - patient still taking hydroxyzine, discontinued today.  6/21  - patient hospitalized (6/15/21) discontinued Abilify and trazodone, started on quetiapine & titrated to 100mg at bedtime and 25mg as needed for sleep.   8/21 - increase quetiapine 150mg   11/21  - discontinued quetiapine 150mg (patient ran out of medications and did not take for several weeks).  12/21 - patient restarted quetiapine 50mg at bedtime   3/22 - increased fluoxetine 60mg    6/8/22 - no medication changes today, patient agreed to take his medications as prescribed (he was taking prazosin as needed)     PAST SUBSTANCE USE HISTORY     Past Use-   Alcohol- none since 2004, used to attend AA meetings prior to COVID.  Tobacco- denies  Caffeine- yes  Cannabis- denies     Opioids- as prescribed           Narcan Kit- prescribed   Other illicit drugs- none       MEDICAL HISTORY and ALLERGY     ALLERGIES: Mirtazapine, Hydrocodone, Ms contin [morphine], and Remeron soltab    Patient Active Problem List   Diagnosis     CARDIOVASCULAR SCREENING; LDL GOAL LESS THAN 160     Obesity, Class I, BMI 30-34.9     Tear meniscus knee, right, initial encounter     Rheumatoid arthritis involving multiple sites, unspecified rheumatoid factor  presence     Chronic pain     Right-sided thoracic back pain, unspecified chronicity     Generalized anxiety disorder     Panic attack     Ankylosing spondylitis (H)     Tobacco use disorder     Moderate major depression (H)     Immunocompromised (H)     Essential hypertension with goal blood pressure less than 140/90     Suicidal ideation     Insomnia due to other mental disorder     Major depressive disorder, recurrent episode, severe with mixed features (H)     Chronic back pain greater than 3 months duration     Contact dermatitis and eczema     Dermatitis     Drug dependence (H)     Encounter for screening colonoscopy     Esophageal reflux     Hematuria     Hyperlipidemia     Lumbar radiculopathy     Plantar fascial fibromatosis     Sprain of sacroiliac region     Overweight     Nonintractable migraine     Narcotic abuse, continuous (H)        MEDICAL REVIEW OF SYSTEMS     Contraception- none    A comprehensive review of systems was performed and is negative other than noted in the HPI.      MEDICATIONS     Current Outpatient Medications   Medication Sig Dispense Refill     acetaminophen (TYLENOL) 500 MG tablet Take 1,000 mg by mouth every 6 hours as needed for mild pain (headache)       buprenorphine HCl-naloxone HCl (SUBOXONE) 8-2 MG per film Place 0.5 Film under the tongue 2 times daily Max 1 film per day. Fill 07/08/22 and start 07/08/22 to last 30 days for chronic pain 30 each 0     etanercept (ENBREL SURECLICK) 50 MG/ML autoinjector Inject 50 mg Subcutaneous once a week 1 mL 3     FLUoxetine (PROZAC) 20 MG capsule Take 3 capsules (60 mg) by mouth daily 90 capsule 1     folic acid (FOLVITE) 1 MG tablet TAKE THREE TABLETS BY MOUTH EVERY  tablet 2     liothyronine (CYTOMEL) 25 MCG tablet Take 1 tablet (25 mcg) by mouth daily 30 tablet 2     medical cannabis (Patient's own supply) See Admin Instructions (The purpose of this order is to document that the patient reports taking medical cannabis.  This is  not a prescription, and is not used to certify that the patient has a qualifying medical condition.)     Pills PRN   Lozenges PRN       methotrexate 2.5 MG tablet Take 4 tablets (10 mg) by mouth every 7 days Please keep appointment on 7/18/22 for additional refills. Please get your labs prior to this appointment as they are overdue. 16 tablet 0     nicotine polacrilex (NICORETTE) 4 MG gum CHEW AND PARK 1 PIECE INSIDE CHEEK AS NEEDED 200 each 1     ondansetron (ZOFRAN-ODT) 4 MG ODT tab DISSOLVE TWO TABLETS BY MOUTH EVERY 12 HOURS AS NEEDED FOR NAUSEA       oxyCODONE (ROXICODONE) 5 MG tablet Take 1 tablet (5 mg) by mouth every 6 hours as needed for severe pain Max of 3/day. Fill/begin 6/8/2022. Short term script for acute pain flare 21 tablet 0     prazosin (MINIPRESS) 1 MG capsule Take 1 capsule (1 mg) by mouth At Bedtime 30 capsule 1     QUEtiapine (SEROQUEL) 50 MG tablet Take 1 tablet (50 mg) by mouth At Bedtime May take additional 25-50mg (1/2 - 1 tablet) daily prn for anxiety/panic attacks 45 tablet 2     vitamin D2 (ERGOCALCIFEROL) 70607 units (1250 mcg) capsule TAKE 1 CAPSULE BY MOUTH EVERY MONDAY AND THURSDAY        VITALS   There were no vitals taken for this visit.      MENTAL STATUS EXAM     Alertness: alert , oriented and sleepy  Appearance: casually groomed, limited obs by vido visit  Behavior/Demeanor: cooperative, pleasant and calm, with poor eye contact   Speech: normal and regular rate and rhythm  Language: intact and no problems  Psychomotor: normal or unremarkable and based on video visit, no abnormal moveemnts or tics were observed  Mood: depressed  Affect: guarded; congruent to: mood- yes, content- yes  Thought Process/Associations: unremarkable  Thought Content:  Reports none;  Denies suicidal & violent ideation and delusions, denies any plan or intent  Perception:  Reports none;  Denies auditory hallucinations and visual hallucinations  Insight: good  Judgment: fair  Cognition: does  appear  grossly intact; formal cognitive testing was not done  Gait and Station: N/A (telehealth)     LABS and DATA     PHQ 5/16/2022 6/6/2022 7/14/2022   PHQ-9 Total Score 14 15 16   Q9: Thoughts of better off dead/self-harm past 2 weeks Not at all More than half the days Several days   F/U: Thoughts of suicide or self-harm - No No   F/U: Self harm-plan - - -   F/U: Self-harm action - - -   F/U: Safety concerns - No No       Recent Labs   Lab Test 10/11/21  1123 06/16/21  0741   CR 0.91 1.09   GFRESTIMATED >90 77     Recent Labs   Lab Test 10/11/21  1123 06/16/21  0741   AST 37 23   ALT 47 33   ALKPHOS 102 88       Recent Labs   Lab Test 10/11/21  1123 06/16/21  0741   GLC 97 92   A1C 5.2  --      Recent Labs   Lab Test 10/11/21  1123   CHOL 209*   TRIG 225*   *   HDL 30*     Recent Labs   Lab Test 10/11/21  1123 06/16/21  0741 03/21/21  0808 03/17/21  0748   WBC 7.0 6.1 6.8 8.3   ANEU  --   --  3.7 4.4   HGB 16.2 14.6 15.6 15.0    166 246 211     ECG 6/15/2021 QTc = 413ms     PSYCHOTROPIC DRUG INTERACTIONS                                                       PSYCHCLINICDDI     Increased risk of QTc prolongation: fluoxetine, quetiapine, Suboxone  Increased risk of serotonin syndrome: fluoxetine, Suboxone  Increased risk of CNS/respiratory depression: Suboxone, quetiapine    Prazosin and buprenorphine may have additive effects in lowering your blood pressure.    MANAGEMENT:  Monitoring for adverse effects and patient is aware of risks     RISK STATEMENT for SAFETY     Les did not appear to be an imminent safety risk to self or others.    TREATMENT RISK STATEMENT: The risks, benefits, alternatives and potential adverse effects have been discussed and are understood by the pt. The pt understands the risks of using street drugs or alcohol. There are no medical contraindications, the pt agrees to treatment with the ability to do so. The pt knows to call the clinic for any problems or to access emergency care if  needed.  Medical and substance use concerns are documented above.  Psychotropic drug interaction check was done, including changes made today.     PROVIDER: Katie Corley MD, PhD    Patient staffed in clinic with Dr. Solo who will sign the note.  Supervisor is Dr. Hunter.

## 2022-08-02 NOTE — TELEPHONE ENCOUNTER
HISTORY OF PRESENT ILLNESS:  Miguel A Chase is a 21 year old left-handed male whom returns regarding Recurrent Instability; LEFT Shoulder. Patient advised to proceed with MRI Arthrogram and he follows up today for review. He has instability with daily activity.... \"pops out of place\" on the daily.     Accompanied by mom  Date of Onset: 7/26/2021 (original injury) - Gym Playing Basketball.... then multiple episodes since then.      Occupation/School: [employer] - lifting - 6/22/2022 last day due to pain - need notes  PT: [facility]P under direction of MD.     Review of Systems   Constitutional: Negative for chills and fever.   HENT: Negative for congestion, sore throat and tinnitus.    Eyes: Negative for redness.   Respiratory: Negative for cough, shortness of breath, wheezing and stridor.    Cardiovascular: Negative for leg swelling.   Gastrointestinal: Negative for diarrhea, nausea and vomiting.   Endocrine: Negative for cold intolerance and heat intolerance.   Musculoskeletal: Negative for arthralgias, joint swelling and myalgias.   Skin: Negative for color change and pallor.   Neurological: Negative for syncope, weakness and numbness.   Hematological: Does not bruise/bleed easily.   Psychiatric/Behavioral: Negative for agitation, confusion and self-injury.       No past medical history on file.     No past surgical history on file.     No family history on file.     Social History     Tobacco Use   Smoking Status Never Smoker   Smokeless Tobacco Never Used      Social History     Substance and Sexual Activity   Alcohol Use Never      Social History     Substance and Sexual Activity   Drug Use Yes   • Types: Marijuana    Comment: daily         MEDICATIONS:  No current outpatient medications on file.     No current facility-administered medications for this visit.        ALLERGIES:  No Known Allergies    PHYSICAL EXAMINATION:    LEFT Shoulder: Positive TTP Anterior Cuff and Biceps Tendon. No TTP AC Joint, Posterior or Greater  Routed to provider to advise see several other messages.  Genaro REINA       Tuberosity. No Atrophy. AROM: FF = 130  / ABD = 130  / ER1 = 60  / ER2 = 90 / IR = Low Back. No significant glenohumeral crepitus. Strength: Supraspinatus 5/5 without pain, Infraspinatus 5/5 without pain, Subscapularis 5/5 without pain. Negative ER Lag. Positive Bergeron. Negative Hankins's. Negative Speed's. Positive Anterior Apprehension. GUARDED anterior or posterior instability noted. NVI distally.    IMAGING & TESTING:   MRI ARTHROGRAM LEFT Shoulder (7/22/2022):  1.   Slight depression and irregularity superior posterior humeral head suspect for old Hill-Sachs lesion and probable old Bankart lesion with slight blunting anterior inferior bony glenoid and labrum.    2.   Small anterior superior labral tear.  3.   No rotator cuff abnormality or tear seen.    AP / [place] View / Axillary Lateral LEFT Shoulder (6/30/2022):  Normal glenohumeral alignment without arthritis. Normal acromiohumeral distance. No significant arthritis at the acromioclavicular joint. No fracture or dislocation. No Hill-Sachs Lesion noted. Type 2 acromion noted.     Multiple Views LEFT Shoulder (7/26/2021): Negative shoulder without fracture or dislocation.      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     Plan as follows:  1. Reviewed the MRI Arthrogram images and discussed his symptoms and exam findings.   2. Treatment options were discussed and he has gross instability on a daily basis.   3. Reviewed the diagnosis and treatment options at this point. Surgery is the next step to consider. Surgery was explained in detail, the procedure, the risks and benefits and the post-operative rehab and recovery. All questions were answered. When ready, the patient will contact my Surgical Scheduler to arrange for LEFT Shoulder Arthroscopic Capsular Shift  4. Please contact my Surgery Scheduler to make arrangements.     Medical Decision Making:, Personally Reviewed outside  xrays, Personally Reviewed MRI images and report and Personally Discussed Surgery: LEFT Shoulder Arthroscopic Capsular Shift and the risks including, but not limited to infection, blood loss, and death     Adolfo Paulino MD  08/04/22

## 2022-08-09 ENCOUNTER — MYC MEDICAL ADVICE (OUTPATIENT)
Dept: PALLIATIVE MEDICINE | Facility: CLINIC | Age: 57
End: 2022-08-09

## 2022-08-09 DIAGNOSIS — M51.369 DDD (DEGENERATIVE DISC DISEASE), LUMBAR: ICD-10-CM

## 2022-08-09 DIAGNOSIS — M45.7 ANKYLOSING SPONDYLITIS OF LUMBOSACRAL REGION (H): ICD-10-CM

## 2022-08-09 DIAGNOSIS — M25.551 HIP PAIN, RIGHT: ICD-10-CM

## 2022-08-09 DIAGNOSIS — M54.59 LUMBAR FACET JOINT PAIN: ICD-10-CM

## 2022-08-09 DIAGNOSIS — G89.29 CHRONIC BILATERAL LOW BACK PAIN WITH RIGHT-SIDED SCIATICA: ICD-10-CM

## 2022-08-09 DIAGNOSIS — F11.20 UNCOMPLICATED OPIOID DEPENDENCE (H): ICD-10-CM

## 2022-08-09 DIAGNOSIS — M54.41 CHRONIC BILATERAL LOW BACK PAIN WITH RIGHT-SIDED SCIATICA: ICD-10-CM

## 2022-08-09 DIAGNOSIS — M05.79 RHEUMATOID ARTHRITIS INVOLVING MULTIPLE SITES WITH POSITIVE RHEUMATOID FACTOR (H): ICD-10-CM

## 2022-08-10 RX ORDER — BUPRENORPHINE AND NALOXONE 8; 2 MG/1; MG/1
0.5 FILM, SOLUBLE BUCCAL; SUBLINGUAL 2 TIMES DAILY
Qty: 30 EACH | Refills: 0 | Status: SHIPPED | OUTPATIENT
Start: 2022-08-10 | End: 2022-09-01 | Stop reason: ALTCHOICE

## 2022-08-10 NOTE — TELEPHONE ENCOUNTER
Script Eprescribed to pharmacy  [place] Prescription Monitoring Program checked    Will send this to MA team to notify patient.    Signed Prescriptions:                        Disp   Refills    buprenorphine HCl-naloxone HCl (SUBOXONE) *30 each0        Sig: Place 0.5 Film under the tongue 2 times daily Max 1           film per day. Fill/Start 08/10/22 to last 30 days           for chronic pain  Authorizing Provider: NAVA BEDOLLA MD

## 2022-08-10 NOTE — TELEPHONE ENCOUNTER
Medication refill information reviewed.     Due date for buprenorphine HCl-naloxone HCl (SUBOXONE) 8-2 MG per film is 08/10/22     Prescriptions prepped for review.     Will route to provider.

## 2022-08-10 NOTE — TELEPHONE ENCOUNTER
Please process a refill of buprenorphine HCl-naloxone HCl (SUBOXONE) 8-2 MG per film to     [id number] - [place] - [id number]  [id number] [address] [id number]  Phone: [phone] Fax: [phone]

## 2022-08-10 NOTE — TELEPHONE ENCOUNTER
Patient requesting refill(s) of buprenorphine HCl-naloxone HCl (SUBOXONE) 8-2 MG per film    Last dispensed from pharmacy on 7/8/22    Patient's last office/virtual visit by prescribing provider on 5/27/22  Next office/virtual appointment scheduled for 8/31/22    Last urine drug screen date 03/16/21  Current opioid agreement on file (completed within the last year) Yes Date of opioid agreement: 5/10/22    E-prescribe to [id number] - [place] -    Will route to nursing [place] for review and preparation of prescription(s).

## 2022-08-12 NOTE — PROGRESS NOTES
Video- Visit Details  Type of service:  video visit for medication management  Time of service:  Video Start Time: 8:10 AM        Video End Time: 8:40 AM  Reason for video visit:  Patient convenience   Originating Site (patient location):  [facility] [place]   Location- Patient's home  Distant Site (provider location):  [facility]  Mode of Communication:  Video Conference via [employer]       [facility]  Psychiatry Clinic  MEDICAL PROGRESS NOTE     CARE TEAM:  PCP- Aiden Resendez    Psychotherapist- None     This person is a 56 year old who uses the name Les and pronouns he, him, his.      DIAGNOSIS     - Major depressive disorder, recurrent, severe, without psychotic features  - Generalized anxiety disorder, with panic  - R/O PTSD     Medical Diagnoses:  - Chronic pain  - Rheumatoid arthritis  - Ankylosing spondylitis  - Hypertension     ASSESSMENT     Mr. Breen reported some improvement in his overall mood and was pleased that he had the motivation and energy to get some repairs done at home. Poor sleep, early awakenings, and the feeling of not being adequately rested remain the same. We discussed his current use of prazosin again, and he agreed to take this medication as scheduled to optimize its effects on his nightmares and sleep. He is interested in re-starting therapy, and a new referral was made today. Denies suicidal ideation, any intent, or plan. No reports of medication side effects. If his symptoms worsen, we'll consider titration from fluoxetine to an SNRI. His antipsychotic labs were ordered.    [place] review was not needed today.     PLAN                                                                                                                1) Medications:   - Continue fluoxetine 60mg daily   - Continue quetiapine 50mg at bedtime  - Continue quetiapine 25-50mg as needed for anxiety and panic  - Continue liothyronine 25mcg daily  - Continue prazosin 1mg  "at bedtime (it appears that patient has been taking it as PRN. He agreed to use a pill box and take this medication as scheduled)    Other:  - On Suboxone     2) Therapy- none currently (terminated with individual resident therapist) insurance may not cover resident clinic, new referral for psychotherapy was made today    3) Next Due   Labs - AP monitoring labs ordered today.  EKG - as needed  Rating scales- AIMS     4) Referral - new psychotherapy referral    5) RTC: 4-5 weeks     6) Crisis Numbers:   Provided routinely in AVS   After hours:  [phone]     PERTINENT BACKGROUND                                                    [most recent eval 07/13/22]     Originally, diagnosed with depression and anxiety in 2015 but first experienced symptoms of depression in middle school. He most recently had a significant worsening of symptoms in 2017 after he was diagnosed with rheumatoid arthritis, ankylosing spondylitis ended up with a knee injury and was no longer able to work or run which he used as his primary coping mechanism.    Pertinent Items Include: suicidal ideation, SIB [cutting], multiple psychotropic trials , severe med reaction, psych hosp (<3), SUBSTANCE USE: alcohol, substance use treatment  and Major Medical Problems (Rheumatoid Arthritis and Ankylosing Spondylitis)       SUBJECTIVE     Today:  - Mr. Breen reports that things are still tough, but he has been doing better since the last visit.  - He states that \"some days are good and some days are bad.\"  - He got some things done at home, put a new mailbox, installed new lights, and felt good about having the motivation and energy to do these repairs.   - Still experiences depressed mood, low energy, poor concentration and memory, feeling hopeless and trapped, and reports that symptoms have slightly improved.  - Sleep generally remains poor; experiencing early awakenings, fearful about going back to sleep due to nightmares, and could not get adequate " "rest. Report daytime sleepiness.  - Discussed sleep hygiene, and avoiding phone usage during night awakenings.    - Reports quetiapine helps with sleep.  - Discussed using prazosin as scheduled to target nightmares and avoid rebound. He reports that he has been taking it \"most nights but not every night;\"  the goal is to optimize the medication's effects on his nightmares and sleep.   - Takes quetiapine PRN for anxiety, but anxiety has not been an issue recently, no reports of panic attacks since the last visit.    - No change in his chronic stressors, including chronic pain and relationship conflict with his mom.  - He is interested in individual therapy, and a new referral was done.  - Antipsychotic labs are ordered at this visit.   - He will monitor BP, and denies any side effects from medications.  - Denies suicidal ideation, intent, or plan today, denies any safety concerns.    Recent Psych Symptoms:   Depression:  depressed mood, low energy, poor concentration /memory, feeling hopeless and feeling trapped, \"bit better\" since the last visit  Elevated:  none  Psychosis:  none  Anxiety:  excessive worry and nervous/overwhelmed, less panic attacks, less anxiety  Trauma Related:  nightmares, flashbacks, negative beliefs / emotions and hypervigilance, nightmares about child trauma, almost every night  Sleep: Yes, 4 hrs average, goes to bed whenever he can    Adverse Effects:  denies  Pertinent Negative Symptoms: No suicidal ideation, violent ideation, aggression, psychosis, hallucinations, delusions, otto and hypomania    Recent Substance Use:     Alcohol- none since , used to attend AA meetings prior to COVID, no plans to start  Tobacco- denies  Caffeine- no,    Cannabis- denies     Opioids- as prescribed           Narcan Kit- prescribed   Other illicit drugs- none     FAMILY and SOCIAL HISTORY                                 pt reported     Family Hx:  Mom - depression (since  when sister )   "   Social Hx:  Financial/ Work- planning to attend [employer] for a degree in psychology; unable to work due to diagnosis of rheumatoid arthritis and ankylosing spondylitis, on disability   Partner/ -  in 1992  Children- 26 and 28 year old  Living situation- [place], house with wife and daughter 27 yo at home, son is in [place], three dogs    Social/ Spiritual Support- Talks to  every two weeks, not many friends, family is supportive, likes walking and hiking     PAST PSYCHIATRIC HISTORY     SIB- Hx of cutting/carving of skin   Suicide Attempt [#, most recent]- No   Suicidal Ideation Hx- No   Violence/Aggression Hx- No, not now  Psychosis Hx- No   Eating Disorder Hx- No   Psych Hosp [#, most recent]- Yes, 3-4 times, last one a few months, 10 days longest  Commitment- No   TMS/ECT- No   Outpatient Programs - Yes, CD treatment at [facility], in patient and outpatient program     PAST MED TRIALS       Medication  Dose   (mg) Effect  Dates of Use   Fluoxetine 40 Felt like was initially helpful 2016 - 2017   Sertraline 100 Effective initially, eventually self discontinued as not helpful 2018 - 7/2020   Duloxetine 30 BID Used to treat nerve pain; felt weird on it 2017   Bupropion  BID ineffective 2017 - 2019   Nortriptyline 50 For pain 2017 -  3/2019   Tirtazapine   Dissociated for entire first week after taking 2012   Trazodone 50   2013             Hydroxyzine 25 QID prn For anxiety and panic attacks; not effective for severe anxiety 2015 - 2017   Buspirone 15 TID   2016 - 2017   Gabapentin 100 TID   2013 - 2016   Alprazolam 0.5 TID For anxiety 4975-7810   Diazepam 2 BID For anxiety 2016   Lorazepam 1 TID For anxiety 2016   Clonazepam 1 For anxiety 2016 - present       PSYCH CRITICAL SUMMARY POINTS         [From Dr. Sage's Notes on 6/8/2022:  9/20 - started escitalopram  11/20 - increased escitalopram 20mg daily  2/21 - stopped hydroxyzine; started trazodone 50mg at bedtime and 25mg daily  as needed for anxiety  3/21 - 4/22 - hospitalized started on Abilify, titrated to 5mg daily; escitalopram switched to fluoxetine 40mg daily; started prazosin 1mg at bedtime. Pain management switched oxycodone to Suboxone and started medical marijuana.  5/21  - patient still taking hydroxyzine, discontinued today.  6/21  - patient hospitalized (6/15/21) discontinued Abilify and trazodone, started on quetiapine & titrated to 100mg at bedtime and 25mg as needed for sleep.   8/21 - increase quetiapine 150mg   11/21  - discontinued quetiapine 150mg (patient ran out of medications and did not take for several weeks).  12/21 - patient restarted quetiapine 50mg at bedtime   3/22 - increased fluoxetine 60mg]    7/14/22  - changed prazosin from PRNto scheduled QHS     PAST SUBSTANCE USE HISTORY     Past Use-   Alcohol- none since 2004, used to attend AA meetings prior to COVID.  Tobacco- denies  Caffeine- yes  Cannabis- denies     Opioids- as prescribed           Narcan Kit- prescribed   Other illicit drugs- none       MEDICAL HISTORY and ALLERGY     ALLERGIES: Mirtazapine, Hydrocodone, Ms contin [morphine], and Remeron soltab    Patient Active Problem List   Diagnosis    CARDIOVASCULAR SCREENING; LDL GOAL LESS THAN 160    Obesity, Class I, BMI 30-34.9    Tear meniscus knee, right, initial encounter    Rheumatoid arthritis involving multiple sites, unspecified rheumatoid factor presence    Chronic pain    Right-sided thoracic back pain, unspecified chronicity    Generalized anxiety disorder    Panic attack    Ankylosing spondylitis (H)    Tobacco use disorder    Moderate major depression (H)    Immunocompromised (H)    Essential hypertension with goal blood pressure less than 140/90    Suicidal ideation    Insomnia due to other mental disorder    Major depressive disorder, recurrent episode, severe with mixed features (H)    Chronic back pain greater than 3 months duration    Contact dermatitis and eczema    Dermatitis     Drug dependence (H)    Encounter for screening colonoscopy    Esophageal reflux    Hematuria    Hyperlipidemia    Lumbar radiculopathy    Plantar fascial fibromatosis    Sprain of sacroiliac region    Overweight    Nonintractable migraine    Narcotic abuse, continuous (H)        MEDICAL REVIEW OF SYSTEMS     Contraception- none    A comprehensive review of systems was performed and is negative other than noted in the HPI.      MEDICATIONS     Current Outpatient Medications   Medication Sig Dispense Refill    acetaminophen (TYLENOL) 500 MG tablet Take 1,000 mg by mouth every 6 hours as needed for mild pain (headache)      buprenorphine HCl-naloxone HCl (SUBOXONE) 8-2 MG per film Place 0.5 Film under the tongue 2 times daily Max 1 film per day. Fill/Start 08/10/22 to last 30 days for chronic pain 30 each 0    etanercept (ENBREL SURECLICK) 50 MG/ML autoinjector Inject 50 mg Subcutaneous once a week 1 mL 3    FLUoxetine (PROZAC) 20 MG capsule Take 3 capsules (60 mg) by mouth daily 90 capsule 1    folic acid (FOLVITE) 1 MG tablet TAKE THREE TABLETS BY MOUTH EVERY  tablet 2    liothyronine (CYTOMEL) 25 MCG tablet Take 1 tablet (25 mcg) by mouth daily 30 tablet 2    medical cannabis (Patient's own supply) See Admin Instructions (The purpose of this order is to document that the patient reports taking medical cannabis.  This is not a prescription, and is not used to certify that the patient has a qualifying medical condition.)     Pills PRN   Lozenges PRN      methotrexate 2.5 MG tablet Take 4 tablets (10 mg) by mouth every 7 days Please keep appointment on 7/18/22 for additional refills. Please get your labs prior to this appointment as they are overdue. 16 tablet 0    nicotine polacrilex (NICORETTE) 4 MG gum CHEW AND PARK 1 PIECE INSIDE CHEEK AS NEEDED 200 each 1    ondansetron (ZOFRAN-ODT) 4 MG ODT tab DISSOLVE TWO TABLETS BY MOUTH EVERY 12 HOURS AS NEEDED FOR NAUSEA      oxyCODONE (ROXICODONE) 5 MG tablet Take 1  tablet (5 mg) by mouth every 6 hours as needed for severe pain Max of 3/day. Fill/begin 6/8/2022. Short term script for acute pain flare 21 tablet 0    prazosin (MINIPRESS) 1 MG capsule Take 1 capsule (1 mg) by mouth At Bedtime 30 capsule 1    QUEtiapine (SEROQUEL) 50 MG tablet Take 1 tablet (50 mg) by mouth At Bedtime May take additional 25-50mg (1/2 - 1 tablet) daily prn for anxiety/panic attacks 45 tablet 2    vitamin D2 (ERGOCALCIFEROL) 36485 units (1250 mcg) capsule TAKE 1 CAPSULE BY MOUTH EVERY MONDAY AND THURSDAY        VITALS   There were no vitals taken for this visit.      MENTAL STATUS EXAM     Alertness: alert , oriented and sleepy  Appearance: casually groomed, limited obs by vido visit  Behavior/Demeanor: cooperative, pleasant and calm, with poor eye contact   Speech: normal and regular rate and rhythm  Language: intact and no problems  Psychomotor: normal or unremarkable and based on video visit, no abnormal moveemnts or tics were observed  Mood: depressed  Affect: guarded; congruent to: mood- yes, content- yes  Thought Process/Associations: unremarkable  Thought Content:  Reports none;  Denies suicidal & violent ideation and delusions, denies any plan or intent  Perception:  Reports none;  Denies auditory hallucinations and visual hallucinations  Insight: good  Judgment: fair  Cognition: does  appear grossly intact; formal cognitive testing was not done  Gait and Station: N/A ([facility])     LABS and DATA     PHQ 5/16/2022 6/6/2022 7/14/2022   PHQ-9 Total Score 14 15 16   Q9: Thoughts of better off dead/self-harm past 2 weeks Not at all More than half the days Several days   F/U: Thoughts of suicide or self-harm - No No   F/U: Self harm-plan - - -   F/U: Self-harm action - - -   F/U: Safety concerns - No No       Recent Labs   Lab Test 10/11/21  1123 06/16/21  0741   CR 0.91 1.09   GFRESTIMATED >90 77     Recent Labs   Lab Test 10/11/21  1123 06/16/21  0741   AST 37 23   ALT 47 33   ALKPHOS 102 88        Recent Labs   Lab Test 10/11/21  1123 06/16/21  0741   GLC 97 92   A1C 5.2  --      Recent Labs   Lab Test 10/11/21  1123   CHOL 209*   TRIG 225*   *   HDL 30*     Recent Labs   Lab Test 10/11/21  1123 06/16/21  0741 03/21/21  0808 03/17/21  0748   WBC 7.0 6.1 6.8 8.3   ANEU  --   --  3.7 4.4   HGB 16.2 14.6 15.6 15.0    166 246 211     ECG 6/15/2021 QTc = 413 ms     PSYCHOTROPIC DRUG INTERACTIONS                                                       PSYCHCLINICDDI     Increased risk of QTc prolongation: fluoxetine, quetiapine, Suboxone  Increased risk of serotonin syndrome: fluoxetine, Suboxone  Increased risk of CNS/respiratory depression: Suboxone, quetiapine    Prazosin and buprenorphine may have additive effects in lowering your blood pressure.    MANAGEMENT:  Monitoring for adverse effects and patient is aware of risks     RISK STATEMENT for SAFETY     Les did not appear to be an imminent safety risk to self or others.     TREATMENT RISK STATEMENT: The risks, benefits, alternatives and potential adverse effects have been discussed and are understood by the pt. The pt understands the risks of using street drugs or alcohol. There are no medical contraindications, the pt agrees to treatment with the ability to do so. The pt knows to call the clinic for any problems or to access emergency care if needed.  Medical and substance use concerns are documented above.  Psychotropic drug interaction check was done, including changes made today.     PROVIDER: Katie Corley MD, PhD    Patient staffed in clinic with Dr. Johansen who will sign the note.  Supervisor is Dr. Hunter.

## 2022-08-15 ENCOUNTER — VIRTUAL VISIT (OUTPATIENT)
Dept: PSYCHIATRY | Facility: CLINIC | Age: 57
End: 2022-08-15
Attending: PSYCHIATRY & NEUROLOGY
Payer: COMMERCIAL

## 2022-08-15 DIAGNOSIS — F51.05 INSOMNIA DUE TO OTHER MENTAL DISORDER: ICD-10-CM

## 2022-08-15 DIAGNOSIS — F41.1 GENERALIZED ANXIETY DISORDER WITH PANIC ATTACKS: ICD-10-CM

## 2022-08-15 DIAGNOSIS — Z51.81 ENCOUNTER FOR THERAPEUTIC DRUG MONITORING: ICD-10-CM

## 2022-08-15 DIAGNOSIS — F33.2 SEVERE EPISODE OF RECURRENT MAJOR DEPRESSIVE DISORDER, WITHOUT PSYCHOTIC FEATURES (H): Primary | ICD-10-CM

## 2022-08-15 DIAGNOSIS — F41.0 GENERALIZED ANXIETY DISORDER WITH PANIC ATTACKS: ICD-10-CM

## 2022-08-15 DIAGNOSIS — F99 INSOMNIA DUE TO OTHER MENTAL DISORDER: ICD-10-CM

## 2022-08-15 PROCEDURE — 99214 OFFICE O/P EST MOD 30 MIN: CPT | Mod: GT | Performed by: STUDENT IN AN ORGANIZED HEALTH CARE EDUCATION/TRAINING PROGRAM

## 2022-08-15 RX ORDER — QUETIAPINE FUMARATE 50 MG/1
50 TABLET, FILM COATED ORAL AT BEDTIME
Qty: 45 TABLET | Refills: 2 | Status: SHIPPED | OUTPATIENT
Start: 2022-08-15 | End: 2022-12-01

## 2022-08-15 RX ORDER — PRAZOSIN HYDROCHLORIDE 1 MG/1
1 CAPSULE ORAL AT BEDTIME
Qty: 30 CAPSULE | Refills: 1 | Status: SHIPPED | OUTPATIENT
Start: 2022-08-15 | End: 2022-10-31

## 2022-08-15 RX ORDER — LIOTHYRONINE SODIUM 25 UG/1
25 TABLET ORAL DAILY
Qty: 30 TABLET | Refills: 2 | Status: SHIPPED | OUTPATIENT
Start: 2022-08-15 | End: 2022-10-31

## 2022-08-15 NOTE — PATIENT INSTRUCTIONS
**For crisis resources, please see the information at the end of this document**   Patient Education    Thank you for coming to the [facility] [place] [facility].     Lab Testing:  If you had lab testing today and your results are reassuring or normal they will be mailed to you or sent through [employer] within 7 days. If the lab tests need quick action we will call you with the results. The phone number we will call with results is # [phone]. If this is not the best number please call our clinic and change the number.     Medication Refills:  If you need any refills please call your pharmacy and they will contact us. Our fax number for refills is [phone].   Three business days of notice are needed for general medication refill requests.   Five business days of notice are needed for controlled substance refill requests.   If you need to change to a different pharmacy, please contact the new pharmacy directly. The new pharmacy will help you get your medications transferred.     Contact Us:  Please call [phone] during business hours (8-5:00 M-F).   If you have medication related questions after clinic hours, or on the weekend, please call [phone].     Financial Assistance [phone]   Medical Records [phone]       MENTAL HEALTH CRISIS RESOURCES:  For a emergency help, please call 911 or go to the nearest Emergency Department.     Emergency Walk-In Options:   [facility] Unit @ [facility] ([place]): [phone] - Specialized mental health emergency area designed to be calming  [facility] ([place]): [phone]  [facility] ([place]): [phone]  [facility]): [phone]    [facility] Crisis Information:   [place]: [phone]  [place]: [phone]  [place] ([place]) - Adult: [phone]     Child: [phone]  [place] - Adult: [phone]     Child: [phone]  [place]:  [phone]  List of all [facility] resources:   https://[place].gov/dhs/people-we-serve/adults/health-care/mental-health/resources/crisis-contacts.jsp    National Crisis Information:   Crisis Text Line: Text  [place]  to 925991  Suicide & Crisis Lifeline: 988  National Suicide Prevention Lifeline: 4-360-620-TALK (1-764.543.7893)       For online chat options, visit https://suicidepreventionlifeline.org/chat/  Poison Control Center: 0-242-987-0866  Trans Lifeline: 8-812-101-5102 - Hotline for transgender people of all ages  The Tu Project: 1-553-473-7692 - Hotline for LGBT youth     For Non-Emergency Support:   Fast Tracker: Mental Health & Substance Use Disorder Resources -   https://www.[employer].org/

## 2022-08-15 NOTE — PROGRESS NOTES
Jailene Breen is a 56 year old who has consented to receive services via billable video visit.      Pt will join video visit via: [employer]  If there are problems joining the visit, send backup video invite via: Text to preferred phone: [phone]      Originating Location (patient location): Patient's home  Distant Location (provider location): [facility] [place] [facility]    Will anyone else be joining the video visit? No

## 2022-08-31 ENCOUNTER — LAB (OUTPATIENT)
Dept: LAB | Facility: CLINIC | Age: 57
End: 2022-08-31
Payer: COMMERCIAL

## 2022-08-31 ENCOUNTER — TELEPHONE (OUTPATIENT)
Dept: PALLIATIVE MEDICINE | Facility: CLINIC | Age: 57
End: 2022-08-31

## 2022-08-31 ENCOUNTER — MYC MEDICAL ADVICE (OUTPATIENT)
Dept: PALLIATIVE MEDICINE | Facility: CLINIC | Age: 57
End: 2022-08-31

## 2022-08-31 ENCOUNTER — OFFICE VISIT (OUTPATIENT)
Dept: PALLIATIVE MEDICINE | Facility: CLINIC | Age: 57
End: 2022-08-31

## 2022-08-31 VITALS — HEART RATE: 65 BPM | DIASTOLIC BLOOD PRESSURE: 95 MMHG | SYSTOLIC BLOOD PRESSURE: 154 MMHG

## 2022-08-31 DIAGNOSIS — Z79.891 ENCOUNTER FOR LONG-TERM USE OF OPIATE ANALGESIC: ICD-10-CM

## 2022-08-31 DIAGNOSIS — M51.369 DDD (DEGENERATIVE DISC DISEASE), LUMBAR: ICD-10-CM

## 2022-08-31 DIAGNOSIS — M54.59 LUMBAR FACET JOINT PAIN: ICD-10-CM

## 2022-08-31 DIAGNOSIS — E66.09 CLASS 1 OBESITY DUE TO EXCESS CALORIES WITHOUT SERIOUS COMORBIDITY WITH BODY MASS INDEX (BMI) OF 34.0 TO 34.9 IN ADULT: ICD-10-CM

## 2022-08-31 DIAGNOSIS — M25.50 MULTIPLE JOINT PAIN: ICD-10-CM

## 2022-08-31 DIAGNOSIS — M05.79 RHEUMATOID ARTHRITIS INVOLVING MULTIPLE SITES WITH POSITIVE RHEUMATOID FACTOR (H): ICD-10-CM

## 2022-08-31 DIAGNOSIS — M47.816 SPONDYLOSIS OF LUMBAR REGION WITHOUT MYELOPATHY OR RADICULOPATHY: ICD-10-CM

## 2022-08-31 DIAGNOSIS — M45.7 ANKYLOSING SPONDYLITIS OF LUMBOSACRAL REGION (H): ICD-10-CM

## 2022-08-31 DIAGNOSIS — M25.551 HIP PAIN, RIGHT: ICD-10-CM

## 2022-08-31 DIAGNOSIS — F11.90 CHRONIC, CONTINUOUS USE OF OPIOIDS: ICD-10-CM

## 2022-08-31 DIAGNOSIS — M06.9 RHEUMATOID ARTHRITIS INVOLVING MULTIPLE SITES, UNSPECIFIED WHETHER RHEUMATOID FACTOR PRESENT (H): ICD-10-CM

## 2022-08-31 DIAGNOSIS — Z79.899 HIGH RISK MEDICATION USE: ICD-10-CM

## 2022-08-31 DIAGNOSIS — F11.20 UNCOMPLICATED OPIOID DEPENDENCE (H): ICD-10-CM

## 2022-08-31 DIAGNOSIS — E66.811 CLASS 1 OBESITY DUE TO EXCESS CALORIES WITHOUT SERIOUS COMORBIDITY WITH BODY MASS INDEX (BMI) OF 34.0 TO 34.9 IN ADULT: ICD-10-CM

## 2022-08-31 DIAGNOSIS — M54.50 CHRONIC BILATERAL LOW BACK PAIN WITHOUT SCIATICA: Primary | ICD-10-CM

## 2022-08-31 DIAGNOSIS — G89.29 CHRONIC BILATERAL LOW BACK PAIN WITHOUT SCIATICA: Primary | ICD-10-CM

## 2022-08-31 DIAGNOSIS — F32.A DEPRESSION, UNSPECIFIED DEPRESSION TYPE: ICD-10-CM

## 2022-08-31 LAB
ALBUMIN SERPL-MCNC: 3.9 G/DL (ref 3.4–5)
ALT SERPL W P-5'-P-CCNC: 46 U/L (ref 0–70)
AST SERPL W P-5'-P-CCNC: 35 U/L (ref 0–45)
BASOPHILS # BLD AUTO: 0 10E3/UL (ref 0–0.2)
BASOPHILS NFR BLD AUTO: 1 %
CREAT SERPL-MCNC: 0.94 MG/DL (ref 0.66–1.25)
CREAT UR-MCNC: 224 MG/DL
CRP SERPL-MCNC: <2.9 MG/L (ref 0–8)
EOSINOPHIL # BLD AUTO: 0.3 10E3/UL (ref 0–0.7)
EOSINOPHIL NFR BLD AUTO: 5 %
ERYTHROCYTE [DISTWIDTH] IN BLOOD BY AUTOMATED COUNT: 12.6 % (ref 10–15)
ERYTHROCYTE [SEDIMENTATION RATE] IN BLOOD BY WESTERGREN METHOD: 9 MM/HR (ref 0–20)
ETHANOL UR QL SCN: NORMAL
GFR SERPL CREATININE-BSD FRML MDRD: >90 ML/MIN/1.73M2
HCT VFR BLD AUTO: 43.9 % (ref 40–53)
HGB BLD-MCNC: 15.1 G/DL (ref 13.3–17.7)
LYMPHOCYTES # BLD AUTO: 1.9 10E3/UL (ref 0.8–5.3)
LYMPHOCYTES NFR BLD AUTO: 32 %
MCH RBC QN AUTO: 30.1 PG (ref 26.5–33)
MCHC RBC AUTO-ENTMCNC: 34.4 G/DL (ref 31.5–36.5)
MCV RBC AUTO: 88 FL (ref 78–100)
MONOCYTES # BLD AUTO: 0.5 10E3/UL (ref 0–1.3)
MONOCYTES NFR BLD AUTO: 9 %
NEUTROPHILS # BLD AUTO: 3.2 10E3/UL (ref 1.6–8.3)
NEUTROPHILS NFR BLD AUTO: 53 %
PLATELET # BLD AUTO: 213 10E3/UL (ref 150–450)
RBC # BLD AUTO: 5.01 10E6/UL (ref 4.4–5.9)
WBC # BLD AUTO: 6 10E3/UL (ref 4–11)

## 2022-08-31 PROCEDURE — 36415 COLL VENOUS BLD VENIPUNCTURE: CPT

## 2022-08-31 PROCEDURE — 82565 ASSAY OF CREATININE: CPT

## 2022-08-31 PROCEDURE — 82040 ASSAY OF SERUM ALBUMIN: CPT

## 2022-08-31 PROCEDURE — 80320 DRUG SCREEN QUANTALCOHOLS: CPT

## 2022-08-31 PROCEDURE — 99215 OFFICE O/P EST HI 40 MIN: CPT | Performed by: NURSE PRACTITIONER

## 2022-08-31 PROCEDURE — 84460 ALANINE AMINO (ALT) (SGPT): CPT

## 2022-08-31 PROCEDURE — 86140 C-REACTIVE PROTEIN: CPT

## 2022-08-31 PROCEDURE — 80307 DRUG TEST PRSMV CHEM ANLYZR: CPT | Mod: 90

## 2022-08-31 PROCEDURE — 85025 COMPLETE CBC W/AUTO DIFF WBC: CPT

## 2022-08-31 PROCEDURE — 80307 DRUG TEST PRSMV CHEM ANLYZR: CPT

## 2022-08-31 PROCEDURE — 85652 RBC SED RATE AUTOMATED: CPT

## 2022-08-31 PROCEDURE — 99000 SPECIMEN HANDLING OFFICE-LAB: CPT

## 2022-08-31 PROCEDURE — 84450 TRANSFERASE (AST) (SGOT): CPT

## 2022-08-31 RX ORDER — OXYCODONE HYDROCHLORIDE 5 MG/1
5 TABLET ORAL EVERY 6 HOURS PRN
Qty: 21 TABLET | Refills: 0 | Status: SHIPPED | OUTPATIENT
Start: 2022-08-31 | End: 2022-10-31

## 2022-08-31 RX ORDER — OXYCODONE HYDROCHLORIDE 5 MG/1
5 TABLET ORAL EVERY 6 HOURS PRN
Qty: 21 TABLET | Refills: 0 | Status: SHIPPED | OUTPATIENT
Start: 2022-08-31 | End: 2022-08-31

## 2022-08-31 RX ORDER — BUPRENORPHINE 8 MG/1
4 TABLET SUBLINGUAL 3 TIMES DAILY
Qty: 45 TABLET | Refills: 0 | Status: SHIPPED | OUTPATIENT
Start: 2022-08-31 | End: 2022-09-29

## 2022-08-31 ASSESSMENT — PAIN SCALES - GENERAL: PAINLEVEL: EXTREME PAIN (9)

## 2022-08-31 NOTE — TELEPHONE ENCOUNTER
Please send to     [id number] - [place] - [address]  [address]  [place] [address]  Phone: [phone] Fax: [phone]    Please route back to nursing to cancel at [facility].    KERLINE RivasN, RN  Care Coordinator  [facility] [place]

## 2022-08-31 NOTE — PROGRESS NOTES
[facility]    8/31/2022        Chief complaint:   Bilateral low back pain, no radiation into the legs  Hands, wrists, shoulders and bilateral knees   Tripped over the dog on the stairs 2 days ago and fell up the stairs and struck his right knee  Low back pain is the most bothersome right now        Interval history:   Jailene Breen is a 56 year old male is known to me for   Lumbar spondylosis, pain is worse with extension and rotation indicating a facetogenic component to pain  Lumbar degenerative disc disease  SI joint pain  Ankylosing spondylitis  Myofascial pain  Chronic pain syndrome  PMHx includes: Panic attacks, back pain, arthritis, anxiety, ankylosing spondylitis  PSHx includes: Right knee surgery ×2, left foot surgery right knee arthroscopy        Recommendations/plan at the last visit on 5/27/2022 included:  1. Recommended to increase activity while pacing himself  2. Physical Therapy:  Let's do this once you are done with the PHP program  3. Clinical Health Psychologist: continue work with your psychiatrist and therapist  4. Diagnostic Studies:  None  5. Medication Management:    1. Continue Suboxone 8/2mg films, use 0.5 film under the tongue Q 12 hours.   1. In future, would consider increase to 4mg TID  2. Oxycodone for pain flare since he is off of his biologics  3. Patient certified for medical cannabis 5/27/2022  6. Further procedures recommended: none at present  7. Recommendations to PCP: see above  8. Follow up:12 weeks in-person visit. Please call [phone] to make your follow-up appointment with me.         Since his last visit, Jailene Breen reports:    Interval history August 31, 2022  -he is back on DMARDs for his RA. (Enbrel and methotrexate)  -low back pain now on both sides. No radiation to the legs  -multiple joint pain from RA/AK  -tripped over dog while carrying groceries up the stairs and he struck his right knee  -he notices he has been  sweating more easily  -notes his weight is close to 300 lbs.       Interval history May 27, 2022  -having an arthritis flare as is currently off of his disease modifying agents. This has now been covered and he will begin this again soon (Enbrel).   -having pain in multiple joints and low back, see above.     Interval history February 4, 2022  -he awoke on 1/1/2022 at 3:30 AM and felt like he was going to die. He called the ambulance and went to the hospital. He was then diagnosed with COVID-19 on 1/3/2022 and has been feeling sick ever since. He has been nauseated and has not been able to take his RA DMARD.   -he has increased joint pain in all of his joints, low back pain and neck pain is worse. He cannot dress himself due to pain. States his finger joints are all swollen. He is using marijuana from [place] Cannabis program and prednisone from  Rheumatology.   -his rheumatologist is out of the clinic for a week. The covering rheumatologist on call felt he could re-start his rheumatology medication. Jamison is not comfortable re-starting his RA medication yet since he is still feeling sweaty and coughing and nauseated.     Interval history October 20, 2021  -he is hurting all over as he previously tapered off of the Suboxone in prep for his hernia surgery. This surgery was postponed due to no beds available due to COVID-19 pandemic. His pain is bad now with no pain medication on board.   -Jamison had preferred to taper off of Suboxone in prep for surgery despite discussing ability to medicate over it and maintain his previous dosing.   -explained that best course of action would be to get him back on Suboxone between 8-16mg/day during the perioperative period (he had been on 16mg/day in divided dosing prior to taper).   -Les did not previously feel that Suboxone was very helpful, but he does notice that his pain is worse being off of the Suboxone.   -he had his court case for disability recently and he states that his   "feels that it went well, waiting to see if this ruling will be in his favor.     Interval history April 21, 2021  -he had COVID injection on Friday 4/16/2021 this was his 2nd injection. Pearce crummy over the weekend.   -he is still dealing with the right hip pain that is the worst pain  -he is having left hip pain as well   -bilateral knee pain as well. It is hard for him to do stairs due to pain.   -he feels that the increase in Suboxone to 4/1mg QID has been a \"titch\" helpful.   -he does not find Voltaren gel or Aspercreme with 4% lidocaine helpful for his knee pain.  -he has finished the partial hospitalization program.  -he will be starting the adult day program for psychiatric care.     Interval history April 6, 2021  -Les states he has been feeling like \"hell.\"   -his low back is bothering him and it will radiate into his right groin and down his right leg to the foot.   -he is walking with a cane.   - He was hospitalized twice recently for suicidal ideation. Mood is still low. Denies SI   -he is attending the Partial Hospitalization program. This is all on Zoom. Lasts for 6 hours Monday through Friday.   -he is also working with his psychiatrist and psychologist in addition to that.  -he tries to walk a few times per week, he thinks this is about a half a mile or so.  -he also notes he tries not to go out much to avoid injury or over-activity.   -he is back on Enbrel and methotrexate. He notes that this is a bit helpful for his joint pain.   -he does not get any relief from the Subxone. He states it \"doesn't do anything.\" he relates he has been on OxyContin 20mg TID in the past. He is frustrated that I switched him off of Oxycodone/OxyContin when he was in the hospital for SI. We discussed again how being on full  Mu-agonist opiates are not a good option for him. Additionally, when we began working together on 3/31/2017, he was NOT on daily opiates. In the time we have worked together, he has gone from being " "on no opiates on a daily basis to #15 tabs/month of oxycodone to slowly increasing to Oxycodone 35mg/day in divided dosing using OxyContin 10mg BID and oxycodone 5mg TID PRN which is 52mg OME (oral morphine equivalent, also known as morphine milligram equivalent-MME). In that time, his functional level and his mood has deteriorated despite increasing opiate dosages. -  -discussed I am open to continuing Suboxone and recommend increasing to 4/1mg every 8 hours, a 50% increase. Patient stated that \"that isn't going to be high enough.\" when I asked what he felt might be helpful, he stated he felt the dosage should be tripled. Explained that doing so is not a safe choice. Additionally, discussed that Suboxone hits the peak pain relief (plateaus) at between 16-18mg/day as the mu-receptors are all flooded.   He is concerned re: having upcoming surgery for a hiatal hernia in May. Reviewed that he can be on short-acting opiates as his surgeon deems necessary, but that he would be tapered off of them and continue on Suboxone with me long term.     Interval history February 16, 2021  -He has had more pain since the right  RFA done 2/1/2021.  -he slipped taking his garbage out last week, he tweaked his back, did not fall.    -he is off of his RA meds as he has an upper respiratory infection  -the extreme cold makes his arthritis pain worse  -he may be having a surgery to fix a hiatal hernia  -flexeril caused urinary retention, this was better when he stopped taking it.     Interval history 11/11/2020  -he has been having issues with emptying his bladder and he has had 2 Carrasco catheters in the near future  -he is trying to get in with a [place] Urologist.  -we discussed how opiate medication can cause urinary hesitancy  -he feels like the urinary symptoms began a couple of weeks after he began lexapro.  -he states he recently found out that his biological dad has had issues with his bladder.   -he had 2nd lumbar medial branch " block done today with Dr. Ashlyn Gamble, we are working toward lumbar RFA..     Interval history 2020  -He has been sick 10 days ago,  had been on antibiotics and was not able to take his RA meds. He then felt better and then is now feeling worse again. Had been tested for COVID-19  -how his whole house is not feeling well.   -he is off of his RA meds, and so his pain is worse because he doesn't feel well, he has a really deep cough.  -having 2nd LMBB next week on the left side, then plan to do bilateral lumbar RFA  -he can't sleep due to body aches and then his back pain is worse.   -recently seen by psychiatry for first time this week, placed on escitalopram for depression/anxiety and hydroxyzine for panic/anxiety PRN, Les tells me that the psychiatrist wants him to stop using the clonazepam.     Interval history 6/10/2020  -having bilateral low back pain that radiates into the groin at times  -having multiple joint pain from RA, his RA flare is better as he is back on the Enbrel now  -he would love to get a new bed as he has issues getting comfortable in bed.   -he is having more left sided low back pain, worse with lumbar extension and extension and rotation. He is interested in pursing possible left sided lumbar RFA.    Interval 2020  -his 28 year old niece overdosed and  last Saturday, she had a 12,5 and 1 year old.   -he is going to her  today  -he did get his Enbrel yesterday  -he is going to see a new rheumatologist in July with the  [employer]    -he says his pain is now a bit better  -he has multiple joint pain from RA and chronic low back pain        At this point, the patient's participation with our multidisciplinary team includes:  The patient has been compliant with the program.    PT - last PHYSICAL THERAPY with Asif Saldana on 2020  Health Psych - worked with  Padilla Keene, last on 2018.  Working with Jonna Farrell PhD and been seen >8 times. Last visit with  "Santa was on 9/2/2020         Pain scores:    Pain intensity on average is 5-9 on a scale of 0-10.    Range is 5-10/10. (his pain can bring him to tears)  Pain right now is 9/10.   Pain is described as \"sharp, throbbing, stabbing,burning,  sometimes I feel like an ice pick is being stabbed in my lower back.\"  Pain is constant in nature      Current pain relevant medications:      -nortriptyline 50mg at bedtime (helpful)  -Enbrel 50mg/mL  -ibuprofen 800mg TID PRN (using 3 times daily-helpful)  -Tylenol 500mg using 1000mg TID (unsure if helpful)  -Maxalt 10mg PRN migraine (helpful for migraine--he does have visual aura)  -naloxone nasal spray PRN for accidental opiate overdose  -chlorzoxazone 500mg TID PRN (not using regularly, not much with muscle spasms right now)  -Suboxone 8/2mg use 0.5 film under the tongue twice daily (helps some)      Other pertinent medications:   -clonazepam 1mg tab, may take 0.5-1mg BID PRN anxiety (helpful, has been using infrequently)  Fluoxetine 40mg every day (somewhat helpful, managed by psychiatry)  -quetiapine 150mg at bedtime        Previous Medications:   OPIATES: Oxycodone (helpful), Fentanyl (100mcg/hr patch helpful), hydrocodone (itching), Tramadol (not helpful), Suboxone (initially felt it was not helpful, but after he tapered off of it due to his preference prior to surgery, he now feels that it was helpful for his pain)  NSAIDS: ibuprofen (not helpful), Aleve (not helpful)  MUSCLE RELAXANTS: methocarbamol (somewhat helpful), Flexeril (somewhat helpful but caused severe urinary retention requiring catheterization), tizanidine (unsure if helpful), metaxalone (unsure), SOMA (helpful)  ANTI-MIGRAINE MEDS: Maxalt (helpful for migraine), Imitrex injection (somewhat helpful)  ANTI-DEPRESSANTS: Prozac (helpful), Cymbalta (felt weird on it), nortriptyline (unsure if helpful), Buspar (unsure), Remeron (blacked out), bupropion (not helpful for smoking cessation)  SLEEP AIDS: Ambien " (helpful)  ANTI-CONVULSANTS: gabapentin (felt odd on this medication, unsure of dose), Lyrica (not helpful for 4 months, unsure of dose), Topamax (not helpful, he felt weird on it)   TOPICALS: Lidocaine patches (not helpful), Voltaren gel (not helpful)  Other meds: Tylenol (unsure if helpful)        Other treatments have included:   Jailene Breen has been seen at a pain clinic in the past.  [facility]  PT: tried, not helpful  Chiropractic: tried, not helpful  Acupuncture: none  TENs Unit: tried, helpful         Injections:     -has had injections at outside clinics  -has had epidural injections for low back pain (one was helpful)  -he has had lumbar medial branch blocks at [facility] and he was going to have lumbar radiofrequency ablation (did not do this due to cost)  -4/3/2017 right LMBBs with Dr. Ashlyn Gamble (helpful)  -4/26/2017 right LMBBs with Dr. Ashlyn Gamble (helpful)  -5/31/2017 right L3, L4, L5 RFA with Dr. Ashlyn Gamble (good relief for over 6 months)  -3/15/2018 right L5 transforaminal MAKAYLA with Dr. Ashlyn Gamble (not helpful)  -5/10/2018 trigger point injections with Dr. Ashlyn Gamble(somewhat helpful).  -7/12/2018 Right L3, L4, L5 RFA with Dr. Ashlyn Gamble (helpful).  -9/20/2018 Left piriformis injections, bilateral gluteal trigger point with Dr. Gamble (helpful).  -1/31/2019 right L2-3, L3-4 and L4-5 facet joint injections with Dr. Ashlyn Gamble (somewhat helpful)  4/4/2019  right L3, L4, L5/sacral ala lumbar radiofrequency ablation with Dr. Gamble (helpful over right side)  6/28/2019 Bilateral facet joint injections at l4-5, L5-S1 with Dr. Holley (only helpful for 7 days)  -2/12/2020 right F8-M3-P8-sacral ALA RFA done with Dr. Ashlyn Gamble (helpful)  -8/26/2020 left L3-5 lumbar medial branch blocks #1 with Dr. Ashlyn Gamble (very helpful)  -11/11/2020 left L3-5 lumbar medial branch blocks #2 with Dr. Ashlyn Gamble  (helpful)   -2/1/2021 bilateral lumbar RFA L4-5 and L5-S1  "with Dr. Ashlyn Gamble   (this \"helped a little bit\" and then he tweaked his back about 2 weeks after it was done and it wasn't helpful)      THE 4 A's OF OPIOID MAINTENANCE ANALGESIA    Analgesia: Suboxone helps some but does not last a full 12 hours    Activity: trying to walk at home    Adverse effects: none    Adherence to Rx protocol: yes          Side Effects: no side effect  Patient is using the medication as prescribed: YES    Medications:  Current Outpatient Medications   Medication Sig Dispense Refill     acetaminophen (TYLENOL) 500 MG tablet Take 1,000 mg by mouth every 6 hours as needed for mild pain (headache)       buprenorphine (SUBUTEX) 8 MG SUBL sublingual tablet Place 0.5 tablets (4 mg) under the tongue 3 times daily Max 1.5 tablets per day. Fill 9/1/2022 and start 9/3/2022. 30 days for chronic pain 45 tablet 0     etanercept (ENBREL SURECLICK) 50 MG/ML autoinjector Inject 50 mg Subcutaneous once a week 1 mL 3     FLUoxetine (PROZAC) 20 MG capsule Take 3 capsules (60 mg) by mouth daily 90 capsule 1     liothyronine (CYTOMEL) 25 MCG tablet Take 1 tablet (25 mcg) by mouth daily 30 tablet 2     medical cannabis (Patient's own supply) See Admin Instructions (The purpose of this order is to document that the patient reports taking medical cannabis.  This is not a prescription, and is not used to certify that the patient has a qualifying medical condition.)     Pills PRN   Lozenges PRN       nicotine polacrilex (NICORETTE) 4 MG gum CHEW AND PARK 1 PIECE INSIDE CHEEK AS NEEDED 200 each 1     prazosin (MINIPRESS) 1 MG capsule Take 1 capsule (1 mg) by mouth At Bedtime 30 capsule 1     QUEtiapine (SEROQUEL) 50 MG tablet Take 1 tablet (50 mg) by mouth At Bedtime May take additional 25-50mg (1/2 - 1 tablet) daily prn for anxiety/panic attacks 45 tablet 2     vitamin D2 (ERGOCALCIFEROL) 33952 units (1250 mcg) capsule TAKE 1 CAPSULE BY MOUTH EVERY MONDAY AND THURSDAY       folic acid (FOLVITE) 1 MG tablet Take 3 " tablets (3 mg) by mouth daily 180 tablet 1     methotrexate 2.5 MG tablet Take 4 tablets (10 mg) by mouth every 7 days 48 tablet 0     oxyCODONE (ROXICODONE) 5 MG tablet Take 1 tablet (5 mg) by mouth every 12 hours as needed for severe pain (7-10) 12 tablet 0     oxyCODONE (ROXICODONE) 5 MG tablet Take 1 tablet (5 mg) by mouth every 6 hours as needed for severe pain Max of 3/day. Fill/begin 8/31/2022. Short term script for acute pain flare (Patient not taking: Reported on 9/22/2022) 21 tablet 0       Medical History: any changes in medical history since they were last seen? No    Social History:    Home situation: , has 2 grown children  Occupation/Schooling: currently unemployed, worked as a  (unemployed since 3/1/2017). 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  Uses nicotine gum  If yes, was cessation counseling offered?  None needed        Physical Exam:     Vital signs: BP (!) 154/95   Pulse 65      Behavioral observations:  Awake, alert and cooperative    Gait:  Slow, has single ended cane    Musculoskeletal exam:      Lumbar extension 15 degrees, painful  Lumbar ext/rot to the right is painful  Lumbar ext/rot to the left is painful  SI joints: bilaterally tender  Piriformis: tender on the left side  GTs: bilaterally tender  Strength grossly equal throughout    Neuro exam: deferred    Skin/vascular/autonomic:  No suspicious lesions on exposed skin.     Other:  na    Is the patient hypertensive today? yes  Hypertensive on recheck of BP?   yes  If yes, was patient recommended to see Primary Care Provider in follow up for management of HTN?  yes      [place] Prescription Monitoring Program:  Reviewed [place]  8/31/2022- no concerning fills.  Susana GRANT, RN CNP, FNP  [facility]  [facility]  [facility]          DIRE Score for selecting candidates for long term opioid analgesia for chronic pain:  Diagnosis  2  Intractablility  2  Risk    Psychological health  1    Chemical health  2    Reliability  2    Social support  2  Efficacy  1    Total DIRE Score = 12. Note that  7-13 predicts poor outcome (compliance and efficacy) from opioid prescribing; 14-21 predicts good outcome (compliance and efficacy)  from opioid prescribing.      Assessment:    1. Chronic bilateral low back pain without sciatica  2. Degenerative disc disease of the lumbar spine  3. Lumbar facet joint pain  4. Spondylosis of lumbar region without myelopathy or radiculopathy  5. Ankylosing spondylitis of lumbosacral region  6. Rheumatoid arthritis involving multiple joints with positive rheumatoid factor  7. Multiple joint pain  8. Right hip pain  9. Class I obesity due to excess calories without serious comorbidity with body mass index of 34-34.9 in adult  10. Uncomplicated opiate dependence  11. Chronic continuous use of opioids  12. Encounter for long-term use of opiate analgesic  13. PMHx includes: Panic attacks, back pain, arthritis, anxiety, ankylosing spondylitis  14. PSHx includes: Right knee surgery ×2, left foot surgery right knee arthroscopy      Plan:    1. Recommended to increase activity while pacing himself  2. Physical Therapy:  Let's do this once you are done with the PHP program  3. Clinical Health Psychologist: continue work with your psychiatrist and therapist  4. Diagnostic Studies:  None  5. Medication Management:    6. Starting today, you may use Suboxone 8/2 films 0.5 film three times daily  7. Then will switch to Subutex due to lower cost. These are 8mg tabs, use 0.5 tab under the tongue three times daily, fill 9/1/2022 and start 9/3.   8. Oxycodone for pain flare short term  9. Patient re-certified for medical cannabis   10. Further procedures recommended: our office will  contact you to schedule bilateral lumbar RFA  11. Referral to [facility] for weight loss which will help overall health and reduce pain and improve mood and self-esteem  12. UDT today, last used Suboxone this morning  13. Recommendations to PCP: see above  14. Follow up:12 weeks in-person visit. Please call [phone] to make your follow-up appointment with me.       Face to face time: 44 minutes        Susana GRANT RN CNP, FNP  [facility]  [facility]

## 2022-08-31 NOTE — TELEPHONE ENCOUNTER
This is being managed in a separate encounter.    KERLINE RivasN, RN  Care Coordinator  [facility] [place]

## 2022-08-31 NOTE — TELEPHONE ENCOUNTER
[facility] Call Center    Phone Message    May a detailed message be left on voicemail: yes     Reason for Call: Medication Question or concern regarding medication   Prescription Clarification  Name of Medication: oxyCODONE (ROXICODONE) 5 MG tablet  Prescribing Provider: Shahzad   Pharmacy: [id number] - [place], [place] - [address]   What on the order needs clarification?  Please resend prescription to the [facility] in [place] since [facility] is not open today.           Action Taken: Other: Pain    Travel Screening: Not Applicable

## 2022-08-31 NOTE — TELEPHONE ENCOUNTER
Signed Prescriptions:                        Disp   Refills    oxyCODONE (ROXICODONE) 5 MG tablet         21 tab*0        Sig: Take 1 tablet (5 mg) by mouth every 6 hours as needed           for severe pain Max of 3/day. Fill/begin           8/31/2022. Short term script for acute pain flare  Authorizing Provider: SUSANA PETTY    Reviewed [place]  August 31, 2022- no concerning fills.    Susana Petty APRN, RN CNP, FNP  [facility]  [facility]

## 2022-08-31 NOTE — PATIENT INSTRUCTIONS
Plan:    Recommended to increase activity while pacing himself  Physical Therapy:  Let's do this once you are done with the [facility] program  Clinical Health Psychologist: continue work with your psychiatrist and therapist  Diagnostic Studies:  None  Medication Management:    Starting today, you may use Suboxone 8/2 films 0.5 film three times daily  Then will switch to Subutex due to lower cost. These are 8mg tabs, use 0.5 tab under the tongue three times daily, fill 9/1/2022 and start 9/3.   Oxycodone for pain flare short term  Patient re-certified for medical cannabis   Further procedures recommended: our office will contact you to schedule bilateral lumbar RFA  Referral to [facility] for weight loss which will help overall health and reduce pain and improve mood and self-esteem  UDT today, last used Suboxone this morning  Recommendations to PCP: see above  Follow up:12 weeks in-person visit. Please call [phone] to make your follow-up appointment with me.     ----------------------------------------------------------------  Clinic Number:  [phone]   Call with any questions about your care and for scheduling assistance.   Calls are returned Monday through Friday between 8 AM and 4:30 PM. We usually get back to you within 2 business days depending on the issue/request.    If we are prescribing your medications:  For opioid medication refills, call the clinic or send a Vetr message 7 days in advance.  Please include:  Name of requested medication  Name of the pharmacy.  For non-opioid medications, call your pharmacy directly to request a refill. Please allow 3-4 days to be processed.   Per [place] State Law:  All controlled substance prescriptions must be filled within 30 days of being written.    For those controlled substances allowing refills, pickup must occur within 30 days of last fill.      We believe regular attendance is key to your success in our program!    Any time you are unable  to keep your appointment we ask that you call us at least 24 hours in advance to cancel.This will allow us to offer the appointment time to another patient.   Multiple missed appointments may lead to dismissal from the clinic.

## 2022-08-31 NOTE — PROGRESS NOTES
08/31/22 1254   PEG: A Thee-Item Scale Assessing Pain Intensity and Interference        0 = No pain / No interference    10 = Pain as bad as you can imagine / Completely interferes   What number best describes your pain on average in the past week? 9   What number best describes how, during the past week, pain has interfered with your enjoyment of life? 7   What number best describes how, during the past week, pain has interfered with your general activity? 6   PEG Total Score 7.33

## 2022-08-31 NOTE — TELEPHONE ENCOUNTER
folic acid (FOLVITE) 1 MG tablet      Last Written Prescription Date:  3/26/22  Last Fill Quantity: 180,   # refills: 2  Last Office Visit : 7/16/21 recommended 3 month follow up  Future Office visit:  1/3/23    Routing refill request to provider for review/approval because:  Protocol permits 90 day refill, will not get to upcoming appointment, can refill be added?    Monitoring labs required every 8-12 weeks for medication refills.  methotrexate 2.5 MG tablet      Last Written Prescription Date:  7/13/22  Last Fill Quantity: 16,   # refills: 0  Last Office Visit: 7/16/21  Future Office visit:  1/3/23    Routing refill request to provider for review/approval because:  Drug not on rheumatology refill protocol   Has had labs done today, results not yet available      CBC RESULTS: Recent Labs   Lab Test 08/31/22  1405   WBC 6.0   RBC 5.01   HGB 15.1   HCT 43.9   MCV 88   MCH 30.1   MCHC 34.4   RDW 12.6          Creatinine   Date Value Ref Range Status   10/11/2021 0.91 0.66 - 1.25 mg/dL Final   06/16/2021 1.09 0.66 - 1.25 mg/dL Final   ]    Liver Function Studies -   Recent Labs   Lab Test 10/11/21  1123   PROTTOTAL 8.2   ALBUMIN 3.9   BILITOTAL 0.7   ALKPHOS 102   AST 37   ALT 47

## 2022-08-31 NOTE — TELEPHONE ENCOUNTER
[facility] Call Center    Phone Message    May a detailed message be left on voicemail: yes     Reason for Call: Medication Refill Request    Has the patient contacted the pharmacy for the refill? Yes   Name of medication being requested: Methotrexate + Folic acid  Provider who prescribed the medication: Karma  Pharmacy: [facility]  Date medication is needed: ASAP-pt is completely out of his medication.      Pt just had lab tests done earlier today (8/31/22), has future appt scheduled with Dr. Parada 1/3/22.    Action Taken: Message routed to:  Adult Clinics: Rheumatology p [id number]    Travel Screening: Not Applicable

## 2022-09-01 ENCOUNTER — TELEPHONE (OUTPATIENT)
Dept: PALLIATIVE MEDICINE | Facility: CLINIC | Age: 57
End: 2022-09-01

## 2022-09-01 DIAGNOSIS — Z20.822 COVID-19 RULED OUT BY LABORATORY TESTING: Primary | ICD-10-CM

## 2022-09-01 LAB — ETHYL GLUCURONIDE UR QL SCN: NEGATIVE NG/ML

## 2022-09-01 RX ORDER — FOLIC ACID 1 MG/1
3 TABLET ORAL DAILY
Qty: 180 TABLET | Refills: 1 | Status: SHIPPED | OUTPATIENT
Start: 2022-09-01 | End: 2023-01-04

## 2022-09-01 NOTE — TELEPHONE ENCOUNTER
Canceled at [id number] Phone: [phone]    LETHA Rivas, RN  Care Coordinator  [facility] [place]

## 2022-09-01 NOTE — RESULT ENCOUNTER NOTE
Normal monitoring labs - normal liver, kidney tests and cell counts.     Raghu Parada MD  8/31/2022

## 2022-09-01 NOTE — TELEPHONE ENCOUNTER
Order received: repeat bilateral lumbar L4-5 and L5-sacral ala RFA last done 2/1/2022 with Dr. Gamble.       Please review         Yun Hurst    [facility]

## 2022-09-02 LAB
BUPRENORPHINE UR CFM-MCNC: 203 NG/ML
BUPRENORPHINE/CREAT UR: 91 NG/MG {CREAT}
NALOXONE UR CFM-MCNC: 440 NG/ML
NALOXONE: 196 NG/MG {CREAT}
NORBUPRENORPHINE UR CFM-MCNC: ABNORMAL NG/ML

## 2022-09-02 NOTE — TELEPHONE ENCOUNTER
[facility] Medical Policy- No PA required      Medicare Supplement:  Follows Medicare guidelines      Coverage Guidance-No PA required    Coverage Indications, Limitations, and/or Medical Necessity    i. Repeat thermal facet joint RFA at the same anatomic site is considered medically reasonable and necessary provided the patient had a minimum of consistent 50% improvement in pain for at least six (6) months or at least 50% consistent improvement in the ability to perform previously painful movements and ADLs as compared to baseline measurement using the same scale;  Frequency Limitation: For each covered spinal region no more than two (2) radiofrequency sessions will be reimbursed per rolling 12 months.            Routing to review and advise          Yasemin MILLAN    [facility]

## 2022-09-06 NOTE — TELEPHONE ENCOUNTER
Spoke to patient and he reports between 50 and 60 % pain relief for at least 6 months with more ability to be active, complete ADLs, and to care for home and pets.    KERLINE RivasN, RN  Care Coordinator  [facility] [place]

## 2022-09-08 ENCOUNTER — MYC MEDICAL ADVICE (OUTPATIENT)
Dept: FAMILY MEDICINE | Facility: CLINIC | Age: 57
End: 2022-09-08

## 2022-09-08 NOTE — TELEPHONE ENCOUNTER
Screening Questions for RFA Procedure      Procedure ordered? repeat bilateral lumbar L4-5 and L5-sacral ala RFA     What insurance are we billing for this procedure?  [facility]   IF SCHEDULING IN [place], IT IS OKAY TO SCHEDULE. [place] HANDLES THEIR OWN PA'S AFTER THE PATIENT IS SCHEDULED. PLEASE SCHEDULE AT LEAST 1 WEEK OUT SO A PA CAN BE OBTAINED.    Has patient had this injection before? Yes:  This procedure requires that a COVID-19 lab test be done; Either a PCR test from [facility] or a Rapid Home Antigen test.  What test would Pt like to do?  PCR    If PCR test from [facility] - Pt must have within 4 days of procedure, let patient know that someone will call them to schedule the COVID-19 test and that they will only receive a call back if the result is positive. Route to nursing to enter order.     If Rapid Home Antigen Test - Pt must have within 2 days of procedure and bring a picture of the negative test with them to the appointment.  Any chance of pregnancy? Not Applicable   If YES, do NOT schedule and route to RN pool     Is  Needed?: No  Will patient have a ?  Yes   If pt is given sedation meds, no driving for 24 hours.  Is pt taking a cab or transportation service? NO        If so will need to be accompanied by an adult too (friend/family member) in order for IV sedation to be given.      Per [place] Policy:  Outpatients are to have responsible adult or family member to accompany them at discharge and drive them home. A service providing medically trained drivers or attendants would be acceptable. Public transportation would not be acceptable unless the patient is accompanied by a responsible adult or family member.  Is patient taking any blood thinners (i.e. plavix, coumadin, jantoven, warfarin, heparin, pradaxa or dabigatran, etc)? No   If YES, do NOT schedule, and route to RN pool    Is patient taking any aspirin products? No     If more than 325mg/day, OK to schedule;  Instruct pt to decrease to less than 325 mg for 7 days AND route to RN pool    For CERVICAL procedures, hold all aspirin products for 6 days.     Tell pt that if aspirin product is not held for 6 days, the procedure WILL BE cancelled.      Does the patient have a bleeding or clotting disorder? No     If YES, it it OKAY to schedule AND route to RN pool    **For any patients with platelet count <100, must be forwarded to provider**    Is patient diabetic? No If YES, have them bring their glucometer.    Does patient have an active infection or treated for one within the past week? No   If YES, do NOT schedule and route to RN nurse pool     Is patient currently taking any antibiotics?  No    For patients on chronic, preventative, or prophylactic antibiotics, procedures may be scheduled.     For patients on antibiotics for active or recent infection:antibiotic course must have been completed for 4 days    Is patient currently taking any steroid medications? (i.e. Prednisone, Medrol)  No     For patients on steroid medications, course must have been completed for 4 days    Is patient actively being treated for cancer or immunocompromised, including the spleen having been removed? No  If YES, do NOT schedule and route to RN pool     Any history of complications with sedation medications?  NO   If YES, OK to schedule AND route to RN pool     Any history of sleep apnea?  NO   If YES, OK to schedule AND route to RN pool     Any cardiac history?  NO   If YES, OK to schedule AND route to RN pool     Do you have an implanted pacemaker, ICD (implanted cardiac device) or AICD (automatic implanted cardiac device)?  NO    If YES, do NOT schedule AND route to RN pool.     Obtain name of device :       Obtain name of cardiologist:       Do you have an implanted stimulator?  NO    If YES, OK to schedule AND route to nursing.     Instruct patient to bring in the remote to the appointment and it will need to be turned off.   reviewed and       Does patient have an allergy to contrast dye, iodine or shellfish?  No   If YES, OK to schedule. Route to RN pool AND add allergy information to appointment notes    Are you able to get on and off an exam table with minimal or no assistance? Yes   If NO, do NOT schedule and route to RN pool    Are you able to roll over and lay on your stomach with minimal or no assistance? Yes   If NO, do NOT schedule and route to RN pool    Reminders:    If you are started on any steroids or antibiotics between now and your appointment, you must contact us because it may affect our ability to perform your procedure.  Yes    Informed patient that s/he needs to fast for 6 hours before procedure?  YES    Informed patient that it is OK to take normal medications with sips of water, especially blood pressure medications, before the procedure and must hold blood thinners as instructed.  Yes    Informed patient to arrive 30 minutes before procedure time to have an IV inserted.  reviewed   Do NOT schedule at 0745, 0815 or 1245.  reviewed     All radiofrequency ablations are in a 90 minute time slot.  reviewed

## 2022-09-08 NOTE — TELEPHONE ENCOUNTER
Covid PCR ordered. Appt note placed.    KERLINE RivasN, RN  Care Coordinator  [facility] [place]

## 2022-09-19 ENCOUNTER — LAB (OUTPATIENT)
Dept: LAB | Facility: CLINIC | Age: 57
End: 2022-09-19
Attending: PAIN MEDICINE
Payer: COMMERCIAL

## 2022-09-19 DIAGNOSIS — Z20.822 COVID-19 RULED OUT BY LABORATORY TESTING: ICD-10-CM

## 2022-09-19 LAB — SARS-COV-2 RNA RESP QL NAA+PROBE: NEGATIVE

## 2022-09-19 PROCEDURE — U0005 INFEC AGEN DETEC AMPLI PROBE: HCPCS

## 2022-09-19 PROCEDURE — U0003 INFECTIOUS AGENT DETECTION BY NUCLEIC ACID (DNA OR RNA); SEVERE ACUTE RESPIRATORY SYNDROME CORONAVIRUS 2 (SARS-COV-2) (CORONAVIRUS DISEASE [COVID-19]), AMPLIFIED PROBE TECHNIQUE, MAKING USE OF HIGH THROUGHPUT TECHNOLOGIES AS DESCRIBED BY CMS-2020-01-R: HCPCS

## 2022-09-22 ENCOUNTER — RADIOLOGY INJECTION OFFICE VISIT (OUTPATIENT)
Dept: PALLIATIVE MEDICINE | Facility: CLINIC | Age: 57
End: 2022-09-22
Attending: NURSE PRACTITIONER
Payer: COMMERCIAL

## 2022-09-22 VITALS
DIASTOLIC BLOOD PRESSURE: 88 MMHG | RESPIRATION RATE: 16 BRPM | OXYGEN SATURATION: 95 % | HEART RATE: 66 BPM | SYSTOLIC BLOOD PRESSURE: 176 MMHG

## 2022-09-22 DIAGNOSIS — M47.816 SPONDYLOSIS OF LUMBAR REGION WITHOUT MYELOPATHY OR RADICULOPATHY: Primary | ICD-10-CM

## 2022-09-22 DIAGNOSIS — M54.59 LUMBAR FACET JOINT PAIN: ICD-10-CM

## 2022-09-22 DIAGNOSIS — G89.18 POST PROCEDURE DISCOMFORT: ICD-10-CM

## 2022-09-22 DIAGNOSIS — M51.369 DDD (DEGENERATIVE DISC DISEASE), LUMBAR: ICD-10-CM

## 2022-09-22 PROCEDURE — 64635 DESTROY LUMB/SAC FACET JNT: CPT | Mod: 50 | Performed by: PAIN MEDICINE

## 2022-09-22 PROCEDURE — 99153 MOD SED SAME PHYS/QHP EA: CPT | Performed by: PAIN MEDICINE

## 2022-09-22 PROCEDURE — 99152 MOD SED SAME PHYS/QHP 5/>YRS: CPT | Performed by: PAIN MEDICINE

## 2022-09-22 PROCEDURE — 64636 DESTROY L/S FACET JNT ADDL: CPT | Mod: 50 | Performed by: PAIN MEDICINE

## 2022-09-22 RX ORDER — OXYCODONE HYDROCHLORIDE 5 MG/1
5 TABLET ORAL EVERY 12 HOURS PRN
Qty: 12 TABLET | Refills: 0 | Status: SHIPPED | OUTPATIENT
Start: 2022-09-22 | End: 2022-09-30

## 2022-09-22 RX ORDER — FENTANYL CITRATE 50 UG/ML
12.5-75 INJECTION, SOLUTION INTRAMUSCULAR; INTRAVENOUS EVERY 5 MIN PRN
Status: DISCONTINUED | OUTPATIENT
Start: 2022-09-22 | End: 2023-05-15

## 2022-09-22 RX ORDER — KETOROLAC TROMETHAMINE 30 MG/ML
15 INJECTION, SOLUTION INTRAMUSCULAR; INTRAVENOUS ONCE
Status: COMPLETED | OUTPATIENT
Start: 2022-09-22 | End: 2022-09-22

## 2022-09-22 RX ADMIN — FENTANYL CITRATE 25 MCG: 50 INJECTION, SOLUTION INTRAMUSCULAR; INTRAVENOUS at 10:36

## 2022-09-22 RX ADMIN — FENTANYL CITRATE 25 MCG: 50 INJECTION, SOLUTION INTRAMUSCULAR; INTRAVENOUS at 10:51

## 2022-09-22 RX ADMIN — FENTANYL CITRATE 50 MCG: 50 INJECTION, SOLUTION INTRAMUSCULAR; INTRAVENOUS at 10:31

## 2022-09-22 RX ADMIN — KETOROLAC TROMETHAMINE 15 MG: 30 INJECTION, SOLUTION INTRAMUSCULAR; INTRAVENOUS at 10:53

## 2022-09-22 ASSESSMENT — PAIN SCALES - GENERAL
PAINLEVEL: EXTREME PAIN (8)
PAINLEVEL: SEVERE PAIN (7)

## 2022-09-22 NOTE — NURSING NOTE
Discharge Information    IV Discontiued Time:  1120    Amount of Fluid Infused:  200    Discharge Criteria = When patient returns to baseline or as per MD order    Consciousness:  Pt is fully awake    Circulation:  BP +/- 20% of pre-procedure level    Respiration:  Patient is able to breathe deeply    O2 Sat:  Patient is able to maintain O2 Sat >92% on room air    Activity:  Moves 4 extremities on command    Ambulation:  Patient is able to stand and walk or stand and pivot into wheelchair    Dressing:  Clean/dry or No Dressing    Notes:   Discharge instructions and AVS given to patient    Patient meets criteria for discharge?  YES    Admitted to PCU?  No    Responsible adult present to accompany patient home?  Yes    Signature/Title:    Josias Lopez RN  RN Care Coordinator  [facility] [place]

## 2022-09-22 NOTE — NURSING NOTE
MD Time IN: 1025   Sedation start time:  1030  Sedation end time:  1110    Medications given: fentanyl 100 mcg IV; versed 4 mg IV; toradol 15 mg IV  Intravenous fluids were administered, normal saline 200 cc's.  Sedation Level Achieved:  Moderate (conscious) sedation    Josias Lopez, RN  Patient Care Supervisor   [facility] [place]

## 2022-09-22 NOTE — PATIENT INSTRUCTIONS
[facility]   Radiofrequency Ablation (RFA) Discharge Instructions     Today you saw:    Dr. Aguilar Holley    You should anticipate procedural pain for up to 2 weeks.   You may receive a prescription for pain medication and you should take this as directed.   Oxycodone 1 tablet every 12 hours as needed for pain   It may take up to 8 weeks to receive relief from the RFA   Follow up with your provider in 6 weeks.   If you received sedation before, during or after your procedure, for the next 24 hours you shall NOT:    -Drive    -Operate machinery    -Drink alcohol    -Sign any legal documents   You may resume your normal diet   You may resume your regular medications after the procedure   If you were holding your blood thinning medication, please restart taking it: N/A  Be cautious with walking. Numbness and/or weakness in the lower extremities may occur for up to 6-8 hours due to effect of local anesthetic  Avoid strenuous activity for the first 24 hours   You may resume your regular activities after 24 hours   You may shower, however no swimming, tub baths or hot tubs for 24 hours following your procedure   You may use ice packs 10-15 minutes three to four times a day at the injection site for comfort   Do not use heat to painful areas for 6 to 8 hours. This will give the local anesthetic time to wear off and prevent you from accidentally burning your skin.   Unless you have been directed to avoid the use of anti-inflammatory medications (NSAIDS), you may use medications such as ibuprofen, Aleve or Tylenol for pain control if needed.   If you experience any of the following, call the Pain Clinic during work hours (Monday through Friday 8 am-4:30 pm) at [phone] or the Provider Line after hours at [phone]:   -Fever over 100 degree F    -Swelling, bleeding, redness, drainage, warmth at the injection site    -Progressive weakness or numbness in your legs   -Loss of bowel or  bladder function    -Unusual headache that is not relieved by Tylenol    -Unusual new onset of pain that is not improving

## 2022-09-22 NOTE — NURSING NOTE
Pre-procedure Intake  If YES to any questions or NO to having a   Please complete laminated checklist and leave on the computer keyboard for Provider, verbally inform provider if able.    For SCS Trial, RFA's or any sedation procedure:  Have you been fasting? Yes    If yes, for how long? More than 6 hours    Are you taking any any blood thinners such as Coumadin, Warfarin, Jantoven, Pradaxa Xarelto, Eliquis, Edoxaban, Enoxaparin, Lovenox, Heparin, Arixtra, Fondaparinux, or Fragmin? OR Antiplatelet medication such as Plavix, Brilinta, or Effient?   No     If yes, when did you take your last dose? NA    Do you take aspirin?  No    If cervical procedure, have you held aspirin for 6 days?   NA    Do you have any allergies to contrast dye, iodine, steroid and/or numbing medications?  NO    Are you currently taking antibiotics or have an active infection?  NO    Have you had a fever/elevated temperature within the past week? NO    Are you currently taking oral steroids? NO    Do you have a ? Yes    Are you pregnant or breastfeeding?  Not Applicable    Have you received the COVID-19 vaccine? Yes    If yes, was it your 1st, 2nd or only dose needed? 2nd    Date of most recent vaccine: 04/16/21    Notify provider and RNs if systolic BP >170, diastolic BP >100, P >100 or O2 sats < 90%      Monica Palma CMA (AAMA)

## 2022-09-29 ENCOUNTER — MYC MEDICAL ADVICE (OUTPATIENT)
Dept: PALLIATIVE MEDICINE | Facility: CLINIC | Age: 57
End: 2022-09-29

## 2022-09-29 DIAGNOSIS — M05.79 RHEUMATOID ARTHRITIS INVOLVING MULTIPLE SITES WITH POSITIVE RHEUMATOID FACTOR (H): ICD-10-CM

## 2022-09-29 DIAGNOSIS — M25.551 HIP PAIN, RIGHT: ICD-10-CM

## 2022-09-29 DIAGNOSIS — F11.90 CHRONIC, CONTINUOUS USE OF OPIOIDS: ICD-10-CM

## 2022-09-29 DIAGNOSIS — Z79.891 ENCOUNTER FOR LONG-TERM USE OF OPIATE ANALGESIC: ICD-10-CM

## 2022-09-29 DIAGNOSIS — F11.20 UNCOMPLICATED OPIOID DEPENDENCE (H): ICD-10-CM

## 2022-09-29 DIAGNOSIS — M51.369 DDD (DEGENERATIVE DISC DISEASE), LUMBAR: ICD-10-CM

## 2022-09-29 DIAGNOSIS — M45.7 ANKYLOSING SPONDYLITIS OF LUMBOSACRAL REGION (H): ICD-10-CM

## 2022-09-29 DIAGNOSIS — M54.59 LUMBAR FACET JOINT PAIN: ICD-10-CM

## 2022-09-29 RX ORDER — BUPRENORPHINE 8 MG/1
4 TABLET SUBLINGUAL 3 TIMES DAILY
Qty: 45 TABLET | Refills: 0 | Status: SHIPPED | OUTPATIENT
Start: 2022-09-29 | End: 2022-10-28

## 2022-09-29 NOTE — TELEPHONE ENCOUNTER
Medication refill information reviewed.     Due date for buprenorphine (SUBUTEX) 8 MG SUBL sublingual tablet is 10/03/22     Prescriptions prepped for review.     Will route to provider.

## 2022-09-29 NOTE — TELEPHONE ENCOUNTER
Please process a refill of buprenorphine (SUBUTEX) 8 MG SUBL sublingual tablet to       [facility] #[id number] - [place], [place] - [address] [facility]  [address]   [place] [address]  Phone: [phone] Fax: [phone]

## 2022-09-29 NOTE — TELEPHONE ENCOUNTER
Patient requesting refill(s) of buprenorphine (SUBUTEX) 8 MG SUBL sublingual tablet    Last dispensed from pharmacy on 8/31/22    Patient's last office/virtual visit by prescribing provider on 8/31/22  Next office/virtual appointment scheduled for 11/23/22    Last urine drug screen date 8/31/22  Current opioid agreement on file (completed within the last year) Yes Date of opioid agreement: 5/10/22    E-prescribe to [facility] #[id number] - [place]     Will route to nursing [place] for review and preparation of prescription(s).

## 2022-09-30 ENCOUNTER — MYC MEDICAL ADVICE (OUTPATIENT)
Dept: PALLIATIVE MEDICINE | Facility: CLINIC | Age: 57
End: 2022-09-30

## 2022-09-30 DIAGNOSIS — G89.18 POST PROCEDURE DISCOMFORT: ICD-10-CM

## 2022-09-30 RX ORDER — OXYCODONE HYDROCHLORIDE 5 MG/1
5 TABLET ORAL EVERY 12 HOURS PRN
Qty: 12 TABLET | Refills: 0 | Status: SHIPPED | OUTPATIENT
Start: 2022-09-30 | End: 2022-11-15

## 2022-09-30 NOTE — TELEPHONE ENCOUNTER
Signed Prescriptions:                        Disp   Refills    oxyCODONE (ROXICODONE) 5 MG tablet         12 tab*0        Sig: Take 1 tablet (5 mg) by mouth every 12 hours as           needed for severe pain Fill/begin 9/30/2022. For           post-procedural pain  Authorizing Provider: SUSANA PETTY    Reviewed [facility] September 30, 2022- no concerning fills.    Susana GRANT, RN CNP, FNP  [facility]  [facility]

## 2022-09-30 NOTE — TELEPHONE ENCOUNTER
09/22/22 Procedure performed: lumbosacral radiofrequency ablation at L4, L5, sacral ala    Patient is requesting an additional refill of post procedural pain medication.  Pended for review    oxyCODONE (ROXICODONE) 5 MG tablet 12 tablet 0 9/22/2022  No   Sig - Route: Take 1 tablet (5 mg) by mouth every 12 hours as needed for severe pain (7-10) - [facility] #[id number] - [place], [place] - [address] [place] [address]  Phone: [phone] Fax: [phone]

## 2022-09-30 NOTE — TELEPHONE ENCOUNTER
Signed Prescriptions:                        Disp   Refills    buprenorphine (SUBUTEX) 8 MG SUBL sublingu*45 tab*0        Sig: Place 0.5 tablets (4 mg) under the tongue 3 times           daily Max 1.5 tablets per day. Fill 10/1/2022 and           start 10/3/2022. 30 days for chronic pain  Authorizing Provider: SUSANA PETTY    Reviewed [place]  September 29, 2022- no concerning fills.    Susana Petty APRN, RN CNP, FNP  [facility]  [facility]

## 2022-10-10 ENCOUNTER — HEALTH MAINTENANCE LETTER (OUTPATIENT)
Age: 57
End: 2022-10-10

## 2022-10-26 ENCOUNTER — IMMUNIZATION (OUTPATIENT)
Dept: FAMILY MEDICINE | Facility: CLINIC | Age: 57
End: 2022-10-26
Payer: COMMERCIAL

## 2022-10-26 DIAGNOSIS — Z23 HIGH PRIORITY FOR 2019-NCOV VACCINE: ICD-10-CM

## 2022-10-26 DIAGNOSIS — Z23 NEED FOR PROPHYLACTIC VACCINATION AND INOCULATION AGAINST INFLUENZA: ICD-10-CM

## 2022-10-26 PROCEDURE — 91313 COVID-19,PF,MODERNA BIVALENT: CPT

## 2022-10-26 PROCEDURE — 90682 RIV4 VACC RECOMBINANT DNA IM: CPT

## 2022-10-26 PROCEDURE — G0008 ADMIN INFLUENZA VIRUS VAC: HCPCS

## 2022-10-26 PROCEDURE — 0134A COVID-19,PF,MODERNA BIVALENT: CPT

## 2022-10-28 ENCOUNTER — MYC MEDICAL ADVICE (OUTPATIENT)
Dept: PALLIATIVE MEDICINE | Facility: CLINIC | Age: 57
End: 2022-10-28

## 2022-10-28 DIAGNOSIS — M51.369 DDD (DEGENERATIVE DISC DISEASE), LUMBAR: ICD-10-CM

## 2022-10-28 DIAGNOSIS — M45.7 ANKYLOSING SPONDYLITIS OF LUMBOSACRAL REGION (H): ICD-10-CM

## 2022-10-28 DIAGNOSIS — F11.20 UNCOMPLICATED OPIOID DEPENDENCE (H): ICD-10-CM

## 2022-10-28 DIAGNOSIS — Z79.891 ENCOUNTER FOR LONG-TERM USE OF OPIATE ANALGESIC: ICD-10-CM

## 2022-10-28 DIAGNOSIS — M05.79 RHEUMATOID ARTHRITIS INVOLVING MULTIPLE SITES WITH POSITIVE RHEUMATOID FACTOR (H): ICD-10-CM

## 2022-10-28 DIAGNOSIS — M54.59 LUMBAR FACET JOINT PAIN: ICD-10-CM

## 2022-10-28 DIAGNOSIS — F11.90 CHRONIC, CONTINUOUS USE OF OPIOIDS: ICD-10-CM

## 2022-10-28 DIAGNOSIS — M25.551 HIP PAIN, RIGHT: ICD-10-CM

## 2022-10-28 RX ORDER — BUPRENORPHINE 8 MG/1
4 TABLET SUBLINGUAL 3 TIMES DAILY
Qty: 45 TABLET | Refills: 0 | Status: SHIPPED | OUTPATIENT
Start: 2022-10-28 | End: 2022-12-27 | Stop reason: DRUGHIGH

## 2022-10-28 NOTE — TELEPHONE ENCOUNTER
Medication refill information reviewed.     Due date for buprenorphine (SUBUTEX) 8 MG SUBL sublingual tablet is 11/02/22     Prescriptions prepped for review.     Will route to provider.

## 2022-10-28 NOTE — TELEPHONE ENCOUNTER
Please process a refill of buprenorphine (SUBUTEX) 8 MG SUBL sublingual tablet to       [facility] #[id number] - [place], [place] - [address] [facility]  [address]   [place] [address]  Phone: [phone] Fax: [phone]

## 2022-10-28 NOTE — TELEPHONE ENCOUNTER
Received call from patient requesting refill(s) of buprenorphine (SUBUTEX) 8 MG SUBL sublingual tablet      Last dispensed from pharmacy on 10/01/2022    Patient's last office/virtual visit by prescribing provider on 08/31/2022  Next office/virtual appointment scheduled for 11/23/2022    Last urine drug screen date 08/31/2022  Current opioid agreement on file (completed within the last year) Yes Date of opioid agreement: 05/06/2022    E-prescribe to [facility] #[id number] - [place], [place] - [address] [facility] [address]    Will route to [facility] for review and preparation of prescription(s).     Ene Kincaid MA  [facility] [place]

## 2022-10-29 NOTE — TELEPHONE ENCOUNTER
Signed Prescriptions:                        Disp   Refills    buprenorphine (SUBUTEX) 8 MG SUBL sublingu*45 tab*0        Sig: Place 0.5 tablets (4 mg) under the tongue 3 times           daily Max 1.5 tablets per day. Fill 10/31/22 and           start 11/02/22. 30 days for chronic pain  Authorizing Provider: SUSANA PETTY    Reviewed [place]  October 28, 2022- no concerning fills.    Susana Petty APRN, RN CNP, FNP  [facility]  [facility]

## 2022-10-30 ENCOUNTER — MYC REFILL (OUTPATIENT)
Dept: FAMILY MEDICINE | Facility: CLINIC | Age: 57
End: 2022-10-30

## 2022-10-30 ENCOUNTER — TELEPHONE (OUTPATIENT)
Dept: PSYCHIATRY | Facility: CLINIC | Age: 57
End: 2022-10-30

## 2022-10-30 DIAGNOSIS — F99 INSOMNIA DUE TO OTHER MENTAL DISORDER: ICD-10-CM

## 2022-10-30 DIAGNOSIS — F51.05 INSOMNIA DUE TO OTHER MENTAL DISORDER: ICD-10-CM

## 2022-10-30 DIAGNOSIS — F41.1 GENERALIZED ANXIETY DISORDER WITH PANIC ATTACKS: ICD-10-CM

## 2022-10-30 DIAGNOSIS — F41.0 GENERALIZED ANXIETY DISORDER WITH PANIC ATTACKS: ICD-10-CM

## 2022-10-30 DIAGNOSIS — F33.2 SEVERE EPISODE OF RECURRENT MAJOR DEPRESSIVE DISORDER, WITHOUT PSYCHOTIC FEATURES (H): ICD-10-CM

## 2022-10-31 ENCOUNTER — MYC REFILL (OUTPATIENT)
Dept: PSYCHIATRY | Facility: CLINIC | Age: 57
End: 2022-10-31

## 2022-10-31 DIAGNOSIS — F41.0 GENERALIZED ANXIETY DISORDER WITH PANIC ATTACKS: ICD-10-CM

## 2022-10-31 DIAGNOSIS — F51.05 INSOMNIA DUE TO OTHER MENTAL DISORDER: ICD-10-CM

## 2022-10-31 DIAGNOSIS — F33.2 SEVERE EPISODE OF RECURRENT MAJOR DEPRESSIVE DISORDER, WITHOUT PSYCHOTIC FEATURES (H): ICD-10-CM

## 2022-10-31 DIAGNOSIS — F99 INSOMNIA DUE TO OTHER MENTAL DISORDER: ICD-10-CM

## 2022-10-31 DIAGNOSIS — F41.1 GENERALIZED ANXIETY DISORDER WITH PANIC ATTACKS: ICD-10-CM

## 2022-10-31 RX ORDER — PRAZOSIN HYDROCHLORIDE 1 MG/1
CAPSULE ORAL
Qty: 30 CAPSULE | Refills: 0 | Status: SHIPPED | OUTPATIENT
Start: 2022-10-31 | End: 2022-12-01

## 2022-10-31 RX ORDER — LIOTHYRONINE SODIUM 25 UG/1
25 TABLET ORAL DAILY
Qty: 30 TABLET | Refills: 0 | Status: SHIPPED | OUTPATIENT
Start: 2022-10-31 | End: 2022-12-01

## 2022-10-31 RX ORDER — PRAZOSIN HYDROCHLORIDE 1 MG/1
1 CAPSULE ORAL AT BEDTIME
Qty: 30 CAPSULE | Refills: 0 | Status: CANCELLED | OUTPATIENT
Start: 2022-10-31

## 2022-10-31 RX ORDER — LIOTHYRONINE SODIUM 25 UG/1
25 TABLET ORAL DAILY
Qty: 30 TABLET | Refills: 0 | Status: CANCELLED | OUTPATIENT
Start: 2022-10-31

## 2022-10-31 NOTE — TELEPHONE ENCOUNTER
Last seen: 08/15/2022  RTC: 4-5 weeks  Cancel: None  No-show: None  Next appt: None     Incoming refill from Patient via [facility]    Medication requested:   Pending Prescriptions:                       Disp   Refills    FLUoxetine (PROZAC) 20 MG capsule [Pharma*90 cap*0            Sig: TAKE 3 CAPSULES BY MOUTH ONCE DAILY    prazosin (MINIPRESS) 1 MG capsule [Pharma*30 cap*0            Sig: TAKE 1 CAPSULE BY MOUTH AT BEDTIME    liothyronine (CYTOMEL) 25 MCG tablet      30 tab*2            Sig: Take 1 tablet (25 mcg) by mouth daily        From chart note:   - Continue fluoxetine 60mg daily   - Continue liothyronine 25mcg daily  - Continue prazosin 1mg at bedtime (it appears that patient has been taking it as PRN. He agreed to use a pill box and take this medication as scheduled)     Medication refill approved per refill protocol.

## 2022-11-01 RX ORDER — ERGOCALCIFEROL 1.25 MG/1
CAPSULE, LIQUID FILLED ORAL
OUTPATIENT
Start: 2022-11-01

## 2022-11-01 NOTE — TELEPHONE ENCOUNTER
My Chart message sent . Rx was E-Prepcribed to:     [facility] #[id number] - [place], [place] - [address] [facility]       Orders Placed This Encounter   Medications     buprenorphine (SUBUTEX) 8 MG SUBL sublingual tablet     Sig: Place 0.5 tablets (4 mg) under the tongue 3 times daily Max 1.5 tablets per day. Fill 10/31/22 and start 11/02/22. 30 days for chronic pain     Dispense:  45 tablet     Refill:  0     NADEAN: For Chronic Pain         Patient notified of dispense and start date.

## 2022-11-03 ENCOUNTER — MYC MEDICAL ADVICE (OUTPATIENT)
Dept: FAMILY MEDICINE | Facility: CLINIC | Age: 57
End: 2022-11-03

## 2022-11-03 ENCOUNTER — VIRTUAL VISIT (OUTPATIENT)
Dept: PSYCHIATRY | Facility: CLINIC | Age: 57
End: 2022-11-03
Attending: PSYCHIATRY & NEUROLOGY
Payer: COMMERCIAL

## 2022-11-03 DIAGNOSIS — F41.1 GENERALIZED ANXIETY DISORDER: ICD-10-CM

## 2022-11-03 DIAGNOSIS — F33.1 MODERATE EPISODE OF RECURRENT MAJOR DEPRESSIVE DISORDER (H): Primary | ICD-10-CM

## 2022-11-03 PROCEDURE — 99207 PR SERVICE NOT STAFFED W/SUPERV PROV: CPT | Mod: 95 | Performed by: STUDENT IN AN ORGANIZED HEALTH CARE EDUCATION/TRAINING PROGRAM

## 2022-11-03 ASSESSMENT — PATIENT HEALTH QUESTIONNAIRE - PHQ9
SUM OF ALL RESPONSES TO PHQ QUESTIONS 1-9: 14
SUM OF ALL RESPONSES TO PHQ QUESTIONS 1-9: 14
10. IF YOU CHECKED OFF ANY PROBLEMS, HOW DIFFICULT HAVE THESE PROBLEMS MADE IT FOR YOU TO DO YOUR WORK, TAKE CARE OF THINGS AT HOME, OR GET ALONG WITH OTHER PEOPLE: VERY DIFFICULT

## 2022-11-03 ASSESSMENT — ANXIETY QUESTIONNAIRES
GAD7 TOTAL SCORE: 10
6. BECOMING EASILY ANNOYED OR IRRITABLE: MORE THAN HALF THE DAYS
7. FEELING AFRAID AS IF SOMETHING AWFUL MIGHT HAPPEN: NOT AT ALL
1. FEELING NERVOUS, ANXIOUS, OR ON EDGE: NOT AT ALL
5. BEING SO RESTLESS THAT IT IS HARD TO SIT STILL: NOT AT ALL
2. NOT BEING ABLE TO STOP OR CONTROL WORRYING: NEARLY EVERY DAY
GAD7 TOTAL SCORE: 10
4. TROUBLE RELAXING: NEARLY EVERY DAY
IF YOU CHECKED OFF ANY PROBLEMS ON THIS QUESTIONNAIRE, HOW DIFFICULT HAVE THESE PROBLEMS MADE IT FOR YOU TO DO YOUR WORK, TAKE CARE OF THINGS AT HOME, OR GET ALONG WITH OTHER PEOPLE: VERY DIFFICULT
GAD7 TOTAL SCORE: 10
7. FEELING AFRAID AS IF SOMETHING AWFUL MIGHT HAPPEN: NOT AT ALL
8. IF YOU CHECKED OFF ANY PROBLEMS, HOW DIFFICULT HAVE THESE MADE IT FOR YOU TO DO YOUR WORK, TAKE CARE OF THINGS AT HOME, OR GET ALONG WITH OTHER PEOPLE?: VERY DIFFICULT
3. WORRYING TOO MUCH ABOUT DIFFERENT THINGS: MORE THAN HALF THE DAYS

## 2022-11-03 NOTE — PROGRESS NOTES
Jailene Breen is a 57 year old who is being evaluated via a billable video visit.      Pt will join video visit via: [employer]  If there are problems joining the visit, send backup video invite via: Text to preferred phone: [phone]    Reason for telehealth visit: Patient has requested telehealth visit    Originating location (patient location): Patient's home    Will anyone else be joining the visit? No

## 2022-11-03 NOTE — PROGRESS NOTES
Video- Visit Details  Type of service:  video visit for medication management  Time of service:    Video Start Time: 9:30 AM        Video End Time: 10:10 AM  Reason for video visit:  Patient convenience   Originating Site (patient location):  [facility] [place]   Location- Patient's home  Distant Site (provider location):  [facility]  Mode of Communication:  Video Conference via [employer]       [facility]  Psychiatry Clinic  MEDICAL PROGRESS NOTE     CARE TEAM:  PCP- Aiden Resendez    Psychotherapist- None       This person is a 57 year old who uses the name Jamison and pronouns he, him, his.      DIAGNOSIS     - Major depressive disorder, recurrent, moderate, without psychotic features  - Generalized anxiety disorder, with panic  - R/O PTSD     Medical Diagnoses:  - Chronic pain  - Rheumatoid arthritis  - Ankylosing spondylitis  - Hypertension     ASSESSMENT     Mr. Breen reported his overall mood has been stable, and depressive symptoms have slightly improved. The main concerns are poor sleep, early awakenings, and the feeling of not being adequately rested. Since the last visit, he has been taking prazosin as scheduled to optimize its effects on his nightmares and sleep. Notes some benefit from this. He agreed to a sleep medicine referral.     He is interested in re-starting therapy, and a new referral was made today. Denies suicidal ideation, any intent, or plan. No reports of medication side effects. If his symptoms worsen, we'll consider titration from fluoxetine to an SNRI. His antipsychotic labs were ordered.    We reviewed his BP readings in the chart (see below) and discussed his recent hx of elevated BP. Patient agreed to make an appointment with his PCP as soon as possible.     Denies SI, plan or intent. No safety concerns.     [place] review was not needed today.     PLAN                                                                                                 "                1) Medications:   - Continue fluoxetine 60 mg daily   - Continue quetiapine 50 mg at bedtime  - Continue quetiapine 25-50 mg as needed for anxiety and panic  - Continue liothyronine 25 mcg daily  - Continue prazosin 1 mg at bedtime (it appears that patient has been taking it as PRN    He agreed to use a pill box and take this medication as scheduled)    Other:  - On Suboxone     2) Therapy- another referral for psychotherapy was made today.    3) Next Due   Labs - Ordered previously, pending  EKG - as needed  Rating scales- AIMS     4) Referral - new psychotherapy referral    5) RTC: 4-6 weeks     6) Crisis Numbers:   Provided routinely in AVS   After hours:  [phone]     PERTINENT BACKGROUND                                                    [most recent eval 07/13/22]     Originally, diagnosed with depression and anxiety in 2015 but first experienced symptoms of depression in middle school. He most recently had a significant worsening of symptoms in 2017 after he was diagnosed with rheumatoid arthritis, ankylosing spondylitis ended up with a knee injury and was no longer able to work or run which he used as his primary coping mechanism.    Pertinent Items Include: suicidal ideation, SIB [cutting], multiple psychotropic trials , severe med reaction, psych hosp (<3), SUBSTANCE USE: alcohol, substance use treatment  and Major Medical Problems (Rheumatoid Arthritis and Ankylosing Spondylitis)       SUBJECTIVE     - Mr. Breen reports that \"things are going kind of the same.\"  - He has good and bad days; \"more good days than bad.\"  - Working on some home improvement projects  - Still concerns about low energy, poor concentration, and memory  - Sleep remains poor; experiencing early awakenings, states could not get adequate rest. Report daytime sleepiness. Discussed sleep hygiene. Agreed to sleep medicine referral.   - Reports quetiapine helps with sleep.  - Taking prazosin as scheduled helped " "somewhat with nightmares and sleep.   - Takes quetiapine PRN for anxiety, but anxiety has not been an issue recently; no reports of panic attacks since the last visit.    - No change in his chronic stressors, including chronic pain and relationship conflict with his mom.  - Sweating when doing physical work, feels unusual, denies chest tightness, SOB- He is interested in individual therapy, and a new referral was done.  - He will monitor BP. We reviewed his BP readings in the chart and discussed his recent hx of elevated BP. Patient agreed to make an appointment with his PCP.   - Denies suicidal ideation, intent, or plan today; denies any safety concerns.    Recent Psych Symptoms:   Depression:  depressed mood, low energy, poor concentration /memory, feeling hopeless and feeling trapped, \"bit better\" since the last visit  Elevated:  none  Psychosis:  none  Anxiety:  excessive worry and nervous/overwhelmed, no panic attacks since the last visit, less anxiety  Trauma Related:  nightmares, flashbacks, negative beliefs / emotions and hypervigilance, nightmares about child trauma, lately he does not remember  Sleep: Yes, 4-6 hrs average, goes to bed whenever he can    Adverse Effects:  denies  Pertinent Negative Symptoms: No suicidal ideation, violent ideation, aggression, psychosis, hallucinations, delusions, otto and hypomania    Recent Substance Use:     Alcohol- none since , used to attend AA meetings prior to COVID, no plans to start  Tobacco- denies  Caffeine- no, Mr.   Cannabis- denies     Opioids- as prescribed           Narcan Kit- prescribed   Other illicit drugs- none     FAMILY and SOCIAL HISTORY                                 pt reported     Family Hx:  Mom - depression (since  when sister )     Social Hx:  Financial/ Work- planning to attend [employer] for a degree in psychology; unable to work due to diagnosis of rheumatoid arthritis and ankylosing spondylitis, on disability   Partner/ " -  in 1992  Children- 26 and 28 year old  Living situation- [place], house with wife and daughter 27 yo at home, son is in [place], three dogs    Social/ Spiritual Support- Talks to  every two weeks, not many friends, family is supportive, likes walking and hiking     PAST PSYCHIATRIC HISTORY     SIB- Hx of cutting/carving of skin   Suicide Attempt [#, most recent]- No   Suicidal Ideation Hx- No   Violence/Aggression Hx- No, not now  Psychosis Hx- No   Eating Disorder Hx- No   Psych Hosp [#, most recent]- Yes, 3-4 times, last one a few months, 10 days longest  Commitment- No   TMS/ECT- No   Outpatient Programs - Yes, CD treatment at [facility], in patient and outpatient program     PAST MED TRIALS       Medication  Dose   (mg) Effect  Dates of Use   Fluoxetine 40 Felt like was initially helpful 2016 - 2017   Sertraline 100 Effective initially, eventually self discontinued as not helpful 2018 - 7/2020   Duloxetine 30 BID Used to treat nerve pain; felt weird on it 2017   Bupropion  BID ineffective 2017 - 2019   Nortriptyline 50 For pain 2017 -  3/2019   Tirtazapine   Dissociated for entire first week after taking 2012   Trazodone 50   2013             Hydroxyzine 25 QID prn For anxiety and panic attacks; not effective for severe anxiety 2015 - 2017   Buspirone 15 TID   2016 - 2017   Gabapentin 100 TID   2013 - 2016   Alprazolam 0.5 TID For anxiety [id number]   Diazepam 2 BID For anxiety 2016   Lorazepam 1 TID For anxiety 2016   Clonazepam 1 For anxiety 2016 - present       PSYCH CRITICAL SUMMARY POINTS         [From Dr. Sage's Notes on 6/8/2022:  9/20 - started escitalopram  11/20 - increased escitalopram 20mg daily  2/21 - stopped hydroxyzine; started trazodone 50mg at bedtime and 25mg daily as needed for anxiety  3/21 - 4/22 - hospitalized started on Abilify, titrated to 5mg daily; escitalopram switched to fluoxetine 40mg daily; started prazosin 1mg at bedtime. Pain management switched  oxycodone to Suboxone and started medical marijuana.  5/21  - patient still taking hydroxyzine, discontinued today.  6/21  - patient hospitalized (6/15/21) discontinued Abilify and trazodone, started on quetiapine & titrated to 100mg at bedtime and 25mg as needed for sleep.   8/21 - increase quetiapine 150mg   11/21  - discontinued quetiapine 150mg (patient ran out of medications and did not take for several weeks).  12/21 - patient restarted quetiapine 50mg at bedtime   3/22 - increased fluoxetine 60mg]    7/14/22  - changed prazosin from PRN to scheduled at bedtime  11/3/202: no medication changes. Elevated BP history discussed. Patient agreed to make a PCP appointment.      PAST SUBSTANCE USE HISTORY     Past Use-   Alcohol- none since 2004, used to attend AA meetings prior to COVID.  Tobacco- denies  Caffeine- yes  Cannabis- denies     Opioids- as prescribed (states not taking currently)          Narcan Kit- prescribed   Other illicit drugs- none     MEDICAL HISTORY and ALLERGY     ALLERGIES: Mirtazapine, Hydrocodone, Ms contin [morphine], and Remeron soltab    Patient Active Problem List   Diagnosis     CARDIOVASCULAR SCREENING; LDL GOAL LESS THAN 160     Obesity, Class I, BMI 30-34.9     Tear meniscus knee, right, initial encounter     Rheumatoid arthritis involving multiple sites, unspecified rheumatoid factor presence     Chronic pain     Right-sided thoracic back pain, unspecified chronicity     Generalized anxiety disorder     Panic attack     Ankylosing spondylitis (H)     Tobacco use disorder     Moderate major depression (H)     Immunocompromised (H)     Essential hypertension with goal blood pressure less than 140/90     Suicidal ideation     Insomnia due to other mental disorder     Major depressive disorder, recurrent episode, severe with mixed features (H)     Chronic back pain greater than 3 months duration     Contact dermatitis and eczema     Dermatitis     Drug dependence (H)     Encounter for  screening colonoscopy     Esophageal reflux     Hematuria     Hyperlipidemia     Lumbar radiculopathy     Plantar fascial fibromatosis     Sprain of sacroiliac region     Overweight     Nonintractable migraine     Narcotic abuse, continuous (H)        MEDICAL REVIEW OF SYSTEMS     Contraception- none    A comprehensive review of systems was performed and is negative other than noted in the HPI.      MEDICATIONS     Current Outpatient Medications   Medication Sig Dispense Refill     buprenorphine (SUBUTEX) 8 MG SUBL sublingual tablet Place 0.5 tablets (4 mg) under the tongue 3 times daily Max 1.5 tablets per day. Fill 10/31/22 and start 11/02/22. 30 days for chronic pain 45 tablet 0     etanercept (ENBREL SURECLICK) 50 MG/ML autoinjector Inject 50 mg Subcutaneous once a week 1 mL 3     FLUoxetine (PROZAC) 20 MG capsule TAKE 3 CAPSULES BY MOUTH ONCE DAILY 90 capsule 0     folic acid (FOLVITE) 1 MG tablet Take 3 tablets (3 mg) by mouth daily 180 tablet 1     liothyronine (CYTOMEL) 25 MCG tablet Take 1 tablet (25 mcg) by mouth daily 30 tablet 0     medical cannabis (Patient's own supply) See Admin Instructions (The purpose of this order is to document that the patient reports taking medical cannabis.  This is not a prescription, and is not used to certify that the patient has a qualifying medical condition.)     Pills PRN   Lozenges PRN       methotrexate 2.5 MG tablet Take 4 tablets (10 mg) by mouth every 7 days 48 tablet 0     oxyCODONE (ROXICODONE) 5 MG tablet Take 1 tablet (5 mg) by mouth every 12 hours as needed for severe pain Fill/begin 9/30/2022. For post-procedural pain 12 tablet 0     prazosin (MINIPRESS) 1 MG capsule TAKE 1 CAPSULE BY MOUTH AT BEDTIME 30 capsule 0     QUEtiapine (SEROQUEL) 50 MG tablet Take 1 tablet (50 mg) by mouth At Bedtime May take additional 25-50mg (1/2 - 1 tablet) daily prn for anxiety/panic attacks 45 tablet 2     vitamin D2 (ERGOCALCIFEROL) 27966 units (1250 mcg) capsule TAKE 1  "CAPSULE BY MOUTH EVERY MONDAY AND THURSDAY        VITALS   There were no vitals taken for this visit.     Pulse Readings from Last 5 Encounters:   09/22/22 66   08/31/22 65   05/02/22 72   03/16/22 67   02/16/22 70     Wt Readings from Last 5 Encounters:   05/02/22 122.7 kg (270 lb 8 oz)   03/16/22 125.3 kg (276 lb 3.2 oz)   02/16/22 122.5 kg (270 lb)   12/30/21 123 kg (271 lb 2 oz)   10/11/21 119.7 kg (264 lb)     BP Readings from Last 5 Encounters:   09/22/22 (!) 176/88   08/31/22 (!) 154/95   05/02/22 (!) 163/90   03/16/22 (!) 143/87   02/16/22 132/82        MENTAL STATUS EXAM     Alertness: alert , oriented and sleepy  Appearance: casually groomed, limited obs by video visit  Behavior/Demeanor: cooperative, pleasant and calm, with poor eye contact   Speech: normal and regular rate and rhythm  Language: intact and no problems  Psychomotor: normal or unremarkable and based on video visit, no abnormal moveemnts or tics were observed  Mood: \"same\" \"more good days than bad\"  Affect: guarded; congruent to: mood- yes, content- yes  Thought Process/Associations: unremarkable  Thought Content:  Reports none;  Denies suicidal & violent ideation and delusions, denies any plan or intent  Perception:  Reports none;  Denies auditory hallucinations and visual hallucinations  Insight: good  Judgment: fair  Cognition: does  appear grossly intact; formal cognitive testing was not done  Gait and Station: N/A ([facility])     LABS and DATA     PHQ 6/6/2022 7/14/2022 11/3/2022   PHQ-9 Total Score 15 16 14   Q9: Thoughts of better off dead/self-harm past 2 weeks More than half the days Several days Not at all   F/U: Thoughts of suicide or self-harm No No -   F/U: Self harm-plan - - -   F/U: Self-harm action - - -   F/U: Safety concerns No No -       Recent Labs   Lab Test 08/31/22  1405 10/11/21  1123   CR 0.94 0.91   GFRESTIMATED >90 >90     Recent Labs   Lab Test 08/31/22  1405 10/11/21  1123 06/16/21  0741   AST 35 37 23   ALT 46 " 47 33   ALKPHOS  --  102 88       Recent Labs   Lab Test 10/11/21  1123 06/16/21  0741   GLC 97 92   A1C 5.2  --      Recent Labs   Lab Test 10/11/21  1123   CHOL 209*   TRIG 225*   *   HDL 30*     Recent Labs   Lab Test 08/31/22  1405 10/11/21  1123 06/16/21  0741 03/21/21  0808 03/17/21  0748   WBC 6.0 7.0   < > 6.8 8.3   ANEU  --   --   --  3.7 4.4   HGB 15.1 16.2   < > 15.6 15.0    232   < > 246 211    < > = values in this interval not displayed.     TSH   Date Value Ref Range Status   05/02/2022 1.46 0.40 - 4.00 mU/L Final   10/11/2021 1.37 0.40 - 4.00 mU/L Final   06/16/2021 0.65 0.40 - 4.00 mU/L Final   03/21/2021 2.39 0.40 - 4.00 mU/L Final   03/17/2021 2.01 0.40 - 4.00 mU/L Final   09/30/2016 1.62 0.40 - 4.00 mU/L Final       ECG 6/15/2021 QTc = 413 ms     PSYCHOTROPIC DRUG INTERACTIONS                                                       PSYCHCLINICDDI     Increased risk of QTc prolongation: fluoxetine, quetiapine, Suboxone  Increased risk of serotonin syndrome: fluoxetine, Suboxone  Increased risk of CNS/respiratory depression: Suboxone, quetiapine    Prazosin and buprenorphine may have additive effects in lowering your blood pressure.    MANAGEMENT:  Monitoring for adverse effects and patient is aware of risks     RISK STATEMENT for SAFETY     Mr. Breen did not appear to be an imminent safety risk to self or others.     TREATMENT RISK STATEMENT: The risks, benefits, alternatives and potential adverse effects have been discussed and are understood by the pt. The pt understands the risks of using street drugs or alcohol. There are no medical contraindications, the pt agrees to treatment with the ability to do so. The pt knows to call the clinic for any problems or to access emergency care if needed.  Medical and substance use concerns are documented above.  Psychotropic drug interaction check was done, including changes made today.     PROVIDER: Katie Corley MD, PhD    Patient not staffed  in clinic.  Note will be reviewed and signed by supervisor Dr. Hunter.

## 2022-11-11 ENCOUNTER — MYC REFILL (OUTPATIENT)
Dept: FAMILY MEDICINE | Facility: CLINIC | Age: 57
End: 2022-11-11

## 2022-11-11 RX ORDER — ERGOCALCIFEROL 1.25 MG/1
CAPSULE, LIQUID FILLED ORAL
Status: CANCELLED | OUTPATIENT
Start: 2022-11-11

## 2022-11-14 ENCOUNTER — MYC MEDICAL ADVICE (OUTPATIENT)
Dept: PALLIATIVE MEDICINE | Facility: CLINIC | Age: 57
End: 2022-11-14

## 2022-11-14 DIAGNOSIS — M25.50 MULTIPLE JOINT PAIN: ICD-10-CM

## 2022-11-14 DIAGNOSIS — M45.7 ANKYLOSING SPONDYLITIS OF LUMBOSACRAL REGION (H): ICD-10-CM

## 2022-11-14 DIAGNOSIS — M05.79 RHEUMATOID ARTHRITIS INVOLVING MULTIPLE SITES WITH POSITIVE RHEUMATOID FACTOR (H): ICD-10-CM

## 2022-11-14 DIAGNOSIS — M51.369 DDD (DEGENERATIVE DISC DISEASE), LUMBAR: ICD-10-CM

## 2022-11-14 DIAGNOSIS — M54.59 LUMBAR FACET JOINT PAIN: ICD-10-CM

## 2022-11-14 DIAGNOSIS — G89.18 POSTOPERATIVE PAIN: ICD-10-CM

## 2022-11-14 NOTE — TELEPHONE ENCOUNTER
Needs labs per provider on refill encounter on 10/30.      Dia Hardin RN  [facility]

## 2022-11-15 ENCOUNTER — VIRTUAL VISIT (OUTPATIENT)
Dept: RHEUMATOLOGY | Facility: CLINIC | Age: 57
End: 2022-11-15
Payer: COMMERCIAL

## 2022-11-15 DIAGNOSIS — M06.9 RHEUMATOID ARTHRITIS INVOLVING MULTIPLE SITES, UNSPECIFIED WHETHER RHEUMATOID FACTOR PRESENT (H): ICD-10-CM

## 2022-11-15 DIAGNOSIS — M06.9 RHEUMATOID ARTHRITIS INVOLVING MULTIPLE SITES, UNSPECIFIED WHETHER RHEUMATOID FACTOR PRESENT (H): Primary | ICD-10-CM

## 2022-11-15 PROCEDURE — 99214 OFFICE O/P EST MOD 30 MIN: CPT | Mod: 95 | Performed by: STUDENT IN AN ORGANIZED HEALTH CARE EDUCATION/TRAINING PROGRAM

## 2022-11-15 NOTE — PROGRESS NOTES
Jamison is a 57 year old who is being evaluated via a billable video visit.      How would you like to obtain your AVS? MyChart  If the video visit is dropped, the invitation should be resent by: Text to cell phone: [phone]  Will anyone else be joining your video visit? No      Video Start Time: 9:01 AM    Subjective : Jailene Breen is a 57 year old male with PMH of anxiety, obesity, HLA-B27 positive ankylosing spondylitis evaluated via a billable virtual visit in consultation at request of Dr Resendez for evaluation of joint pains.     He was diagnosed with seropositive rheumatoid arthritis in 2016, established care with Dr. Raygoza in 5/2016 and was started on methotrexate.  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 He was started on Humira in 12/2016 due to AS.  Discontinued methotrexate in 2/2017 due to lack of improvement. Humira was changed to Enbrel in 4/2019 due to lack of improvement.  Methotrexate was restarted in July 2020.     Interim History : In the last couple months he had flare up, it starts in his hands, ankles and then in his shoulders. Can not lift his arm up. Both the shoulders were hurting bad. He is having diarrhea for last couple months. He has started a new antidepressant. He is on Enbrel but has not taken Enbrel for a month due to diarrhea issues.  He thinks that Enbrel helps with his joint pains.  He is on methotrexate 4 tablets once a week but cannot tell if it helps him or not.  He has prednisone tablets and take 20 mg once a month when his symptoms worsens.  He does not like to take prednisone since his anxiety symptoms get worse.    He has lumbar degenerative disc disease and gets MAKAYLA.  He has noticed urinary retention issues sporadically.    July 16, 2021 - For the last few months has been struggling with depression.  He was admitted few  times in the hospital due to acute worsening of depression.  He is following up with his psychiatrist.  Few new medications including Seroquel was started.  He feels his symptoms are gradually getting better.  For the last few days he has noticed decrease in his appetite.  He has follow-up with his psychiatrist in few weeks and will discuss about that.  He is also on methotrexate 4 tablets once a week and Enbrel 50 subcu once a week for RA.  Overall his RA is stable.  He gets flareups once every 2 months.  Gets flare up he has pain in his hands, wrists, shoulders, feet.      November 15, 2022 - He is on Enbrel, he has not had his shot this week and he can feel some pain in his joints. If he does little bit activity he gets very sweaty and SOB. His cholesterol is very high.  He will follow-up with primary care to check his cholesterol, cardiac work-up, TSH and the hormone levels.  He is on Methotrexate 4 tab once a week. He denies any RA flares.     Review of systems negative except for those mentioned above    Reviewed past medical, surgical, family history    Pertinent labs:     Component      Latest Ref Rng & Units 8/31/2022 8/31/2022 8/31/2022 8/31/2022           2:05 PM  2:05 PM  2:05 PM  2:05 PM   WBC      4.0 - 11.0 10e3/uL 6.0      RBC Count      4.40 - 5.90 10e6/uL 5.01      Hemoglobin      13.3 - 17.7 g/dL 15.1      Hematocrit      40.0 - 53.0 % 43.9      MCV      78 - 100 fL 88      MCH      26.5 - 33.0 pg 30.1      MCHC      31.5 - 36.5 g/dL 34.4      RDW      10.0 - 15.0 % 12.6      Platelet Count      150 - 450 10e3/uL 213      % Neutrophils      % 53      % Lymphocytes      % 32      % Monocytes      % 9      % Eosinophils      % 5      % Basophils      % 1      Absolute Neutrophils      1.6 - 8.3 10e3/uL 3.2      Absolute Lymphocytes      0.8 - 5.3 10e3/uL 1.9      Absolute Monocytes      0.0 - 1.3 10e3/uL 0.5      Absolute Eosinophils      0.0 - 0.7 10e3/uL 0.3      Absolute Basophils      0.0 - 0.2  10e3/uL 0.0      Creatinine      0.66 - 1.25 mg/dL    0.94   GFR Estimate      >60 mL/min/1.73m2    >90   Sed Rate      0 - 20 mm/hr  9     CRP Inflammation      0.0 - 8.0 mg/L   <2.9    Albumin      3.4 - 5.0 g/dL       ALT      0 - 70 U/L       AST      0 - 45 U/L         Component      Latest Ref Rng & Units 8/31/2022 8/31/2022 8/31/2022           2:05 PM  2:05 PM  2:05 PM   WBC      4.0 - 11.0 10e3/uL      RBC Count      4.40 - 5.90 10e6/uL      Hemoglobin      13.3 - 17.7 g/dL      Hematocrit      40.0 - 53.0 %      MCV      78 - 100 fL      MCH      26.5 - 33.0 pg      MCHC      31.5 - 36.5 g/dL      RDW      10.0 - 15.0 %      Platelet Count      150 - 450 10e3/uL      % Neutrophils      %      % Lymphocytes      %      % Monocytes      %      % Eosinophils      %      % Basophils      %      Absolute Neutrophils      1.6 - 8.3 10e3/uL      Absolute Lymphocytes      0.8 - 5.3 10e3/uL      Absolute Monocytes      0.0 - 1.3 10e3/uL      Absolute Eosinophils      0.0 - 0.7 10e3/uL      Absolute Basophils      0.0 - 0.2 10e3/uL      Creatinine      0.66 - 1.25 mg/dL      GFR Estimate      >60 mL/min/1.73m2      Sed Rate      0 - 20 mm/hr      CRP Inflammation      0.0 - 8.0 mg/L      Albumin      3.4 - 5.0 g/dL 3.9     ALT      0 - 70 U/L  46    AST      0 - 45 U/L   35       Physical exam : Moderately built, appear stated age, cooperative  Musculoskeletal: Denies tenderness over MCP, PIP, DIP joints today.  No swelling reported over bilateral wrists.  Reports tenderness over MTP joints.  No tenderness reported over bilateral ankles.  Tenderness present over the lower back.    Assessment     Seropositive rheumatoid arthritis  History of ankylosing spondylitis based on MRI pelvis ( 6/2016 )  Rheumatoid factor-78, anti-CCP antibody > 340 - 4/2016  HLA-B27 positive  MRI SI joint-June 2016-partial ankylosis of left SI joint    Seropositive rheumatoid arthritis: He has seropositive rheumatoid arthritis (+ RF, +  anti-CCP ) manifesting as a polyarticular symmetric arthritis.  He is on Enbrel 50 mg subcu once a week and Methotrexate 4 tab once a week. He thinks Enbrel helps with his joint pains. Methotrexate was started in November 2020.  He has not noticed any side effects with it.  He reports discomfort in his joints specially towards the end of the week when he is due for his Enbrel shot.  I discussed that we can increase the dose of methotrexate to 6 tablets once a week to see if that helps him more.  We will repeat methotrexate monitoring labs in a month.  Monitoring labs done in 8/2022 were normal.     New onset sweating and shortness of breath: He gets short of breath and diaphoretic while doing minor activities.  Recommended him to follow-up with PCP to get cardiac work-up including EKG, echocardiogram and stress test if needed.  His vitamin D, TSH, cholesterol level can be checked.    Ankylosing spondylitis: He was given diagnosis of ankylosing spondylitis in 2016 after MRI of the SI joints showed partial ankylosis.  He is HLA-B27 positive.  Overall his symptoms are stable on Enbrel.    Vaccinations: Vaccinations reviewed with Mr Breen. Risks and benefits of vaccinations were discussed.  - Influenza: encouraged yearly vaccination  - Prenar 20 : recommend getting it, hold Methotrexate for 1 week after the vaccine  - Zostavax: received 2 doses  - COVID : 2 doses and booster done.       Plan     Increase methotrexate to 5 tablets next week and then following week increase it to 6 tablets once a week    Continue folic acid daily    Continue Enbrel 50 mg subcu once a week    Methotrexate monitoring labs in 1 month and then every 3 months    Follow-up in 6 months.      Video-Visit Details    Type of service:  Video Visit    Video End Time: 10:00 AM     Originating Location (pt. Location): Home    Distant Location (provider location):  [facility]     Platform used for Video Visit:  [place]          Distant Location (provider location):  Off-site    Platform used for Video Visit: Mya

## 2022-11-15 NOTE — TELEPHONE ENCOUNTER
etanercept (ENBREL SURECLICK) 50 MG/ML auto injector last filled 03/30/22. Reordered yesterday 11/15/22.    Pended oxycodone 5 mg for provider review. Last filled for pain flare on 08/31/22     MyChart from patient:    Hello, I'm having a pretty bad flare up and waiting to get my enbrel approved again to be covered for another year, usually takes little less than 2 weeks.  Could you please refill the oxycodone to help with the flare up and gap with enbrel and send to [facility].    Will route to provider to review request for oxycodone to     [id number] - [place], [place] - [address] [address]  Phone: [phone] Fax: [phone]

## 2022-11-15 NOTE — PATIENT INSTRUCTIONS
Increase methotrexate to 5 tablets next week and then following week increase it to 6 tablets once a week    Continue folic acid daily    Continue Enbrel 50 mg subcu once a week    Methotrexate monitoring labs in 1 month and then every 3 months    Recommend to get Prevnar 20 vaccine.  Hold methotrexate for 1 week after pneumococcal vaccine    Follow-up in 6 months.

## 2022-11-16 ENCOUNTER — TELEPHONE (OUTPATIENT)
Dept: RHEUMATOLOGY | Facility: CLINIC | Age: 57
End: 2022-11-16

## 2022-11-16 RX ORDER — OXYCODONE HYDROCHLORIDE 5 MG/1
5 TABLET ORAL EVERY 6 HOURS PRN
Qty: 21 TABLET | Refills: 0 | Status: SHIPPED | OUTPATIENT
Start: 2022-11-16 | End: 2022-11-23

## 2022-11-16 NOTE — TELEPHONE ENCOUNTER
[facility] Call Center    Phone Message    May a detailed message be left on voicemail: yes     Reason for Call: Other: Patient called to follow up, requesting an update on the medication request for oxyCODONE (ROXICODONE) 5 MG tablet he sent on 11/14/2022     Action Taken: Message routed to:  Other: BG pain    Travel Screening: Not Applicable

## 2022-11-16 NOTE — TELEPHONE ENCOUNTER
Signed Prescriptions:                        Disp   Refills    oxyCODONE (ROXICODONE) 5 MG tablet         21 tab*0        Sig: Take 1 tablet (5 mg) by mouth every 6 hours as needed           for severe pain (7-10) Max of 3/day. Fill/begin           11/16/22. Short term script for acute pain flare.  Authorizing Provider: SUSANA PETTY      Reviewed [place]  November 16, 2022- no concerning fills.    Susana Petty APRN, RN CNP, FNP  [facility]  [facility] [place]

## 2022-11-17 NOTE — TELEPHONE ENCOUNTER
LVM, notified that prescription was sent to preferred pharmacy.    Able to fill  and start 11/16/22      Joy Kessler MA  [facility] [place]

## 2022-11-23 ENCOUNTER — TELEPHONE (OUTPATIENT)
Dept: PALLIATIVE MEDICINE | Facility: CLINIC | Age: 57
End: 2022-11-23

## 2022-11-23 ENCOUNTER — OFFICE VISIT (OUTPATIENT)
Dept: PALLIATIVE MEDICINE | Facility: CLINIC | Age: 57
End: 2022-11-23
Payer: COMMERCIAL

## 2022-11-23 VITALS — HEART RATE: 72 BPM | SYSTOLIC BLOOD PRESSURE: 138 MMHG | DIASTOLIC BLOOD PRESSURE: 85 MMHG

## 2022-11-23 DIAGNOSIS — M45.7 ANKYLOSING SPONDYLITIS OF LUMBOSACRAL REGION (H): ICD-10-CM

## 2022-11-23 DIAGNOSIS — F11.20 UNCOMPLICATED OPIOID DEPENDENCE (H): ICD-10-CM

## 2022-11-23 DIAGNOSIS — M54.59 LUMBAR FACET JOINT PAIN: ICD-10-CM

## 2022-11-23 DIAGNOSIS — M54.50 CHRONIC BILATERAL LOW BACK PAIN WITHOUT SCIATICA: Primary | ICD-10-CM

## 2022-11-23 DIAGNOSIS — Z79.891 ENCOUNTER FOR LONG-TERM USE OF OPIATE ANALGESIC: ICD-10-CM

## 2022-11-23 DIAGNOSIS — G89.29 CHRONIC BILATERAL LOW BACK PAIN WITHOUT SCIATICA: Primary | ICD-10-CM

## 2022-11-23 DIAGNOSIS — M51.369 DDD (DEGENERATIVE DISC DISEASE), LUMBAR: ICD-10-CM

## 2022-11-23 DIAGNOSIS — M05.79 RHEUMATOID ARTHRITIS INVOLVING MULTIPLE SITES WITH POSITIVE RHEUMATOID FACTOR (H): ICD-10-CM

## 2022-11-23 DIAGNOSIS — M25.50 MULTIPLE JOINT PAIN: ICD-10-CM

## 2022-11-23 DIAGNOSIS — F11.90 CHRONIC, CONTINUOUS USE OF OPIOIDS: ICD-10-CM

## 2022-11-23 DIAGNOSIS — M25.551 HIP PAIN, RIGHT: ICD-10-CM

## 2022-11-23 PROCEDURE — 99214 OFFICE O/P EST MOD 30 MIN: CPT | Performed by: NURSE PRACTITIONER

## 2022-11-23 RX ORDER — BUPRENORPHINE 8 MG/1
4-8 TABLET SUBLINGUAL 4 TIMES DAILY
Qty: 60 TABLET | Refills: 0 | Status: SHIPPED | OUTPATIENT
Start: 2022-11-23 | End: 2022-12-27

## 2022-11-23 RX ORDER — OXYCODONE HYDROCHLORIDE 5 MG/1
5 TABLET ORAL EVERY 6 HOURS PRN
Qty: 42 TABLET | Refills: 0 | Status: SHIPPED | OUTPATIENT
Start: 2022-11-23 | End: 2022-11-23

## 2022-11-23 RX ORDER — OXYCODONE HYDROCHLORIDE 5 MG/1
5 TABLET ORAL EVERY 6 HOURS PRN
Qty: 42 TABLET | Refills: 0 | Status: SHIPPED | OUTPATIENT
Start: 2022-11-23 | End: 2022-12-12

## 2022-11-23 ASSESSMENT — PAIN SCALES - GENERAL: PAINLEVEL: EXTREME PAIN (9)

## 2022-11-23 NOTE — PATIENT INSTRUCTIONS
Plan:    Recommended to increase activity while pacing himself  Physical Therapy:  Let's do this once you are done with the [facility] program  Clinical Health Psychologist: continue work with your psychiatrist and therapist  Diagnostic Studies:  None  Medication Management:    Starting today, you may use Subutex 8/2 tabs 0.5-1 tab up to 4 times daily, max of 2 full tabs per day.   Oxycodone for pain flare short term  Continue medical cannabis   Further procedures recommended:none  You can reach out to the [facility] here whenever this is good for you.   [facility]   [address], [address]   [facility] [address]   Phone: [phone]   Fax: [phone]     Recommendations to PCP: see above  Follow up:12 weeks in-person visit. Please call [phone] to make your follow-up appointment with me.     ----------------------------------------------------------------  Clinic Number:  [phone]   Call with any questions about your care and for scheduling assistance.   Calls are returned Monday through Friday between 8 AM and 4:30 PM. We usually get back to you within 2 business days depending on the issue/request.    If we are prescribing your medications:  For opioid medication refills, call the clinic or send a Acc[employer] message 7 days in advance.  Please include:  Name of requested medication  Name of the pharmacy.  For non-opioid medications, call your pharmacy directly to request a refill. Please allow 3-4 days to be processed.   Per [place] State Law:  All controlled substance prescriptions must be filled within 30 days of being written.    For those controlled substances allowing refills, pickup must occur within 30 days of last fill.      We believe regular attendance is key to your success in our program!    Any time you are unable to keep your appointment we ask that you call us at least 24 hours in advance to cancel.This will allow us to offer the appointment time to another  patient.   Multiple missed appointments may lead to dismissal from the clinic.

## 2022-11-23 NOTE — PROGRESS NOTES
[facility]      11/22/2022        Chief complaint:   Bilateral low back pain, no radiation into the legs  Hands, wrists, shoulders and bilateral knees   Low back pain is the most bothersome right now        Interval history:   Jailene Breen is a 57 year old male is known to me for   Lumbar spondylosis, pain is worse with extension and rotation indicating a facetogenic component to pain  Lumbar degenerative disc disease  SI joint pain  Ankylosing spondylitis  Myofascial pain  Chronic pain syndrome  PMHx includes: Panic attacks, back pain, arthritis, anxiety, ankylosing spondylitis  PSHx includes: Right knee surgery ×2, left foot surgery right knee arthroscopy        Recommendations/plan at the last visit on 8/31/2022 included:  1. Recommended to increase activity while pacing himself  2. Physical Therapy:  Let's do this once you are done with the PHP program  3. Clinical Health Psychologist: continue work with your psychiatrist and therapist  4. Diagnostic Studies:  None  5. Medication Management:    6. Starting today, you may use Suboxone 8/2 films 0.5 film three times daily  7. Then will switch to Subutex due to lower cost. These are 8mg tabs, use 0.5 tab under the tongue three times daily, fill 9/1/2022 and start 9/3.   8. Oxycodone for pain flare short term  9. Patient re-certified for medical cannabis   10. Further procedures recommended: our office will contact you to schedule bilateral lumbar RFA  11. Referral to Comprehensive Weight [facility] for weight loss which will help overall health and reduce pain and improve mood and self-esteem  12. UDT today, last used Suboxone this morning  13. Recommendations to PCP: see above  14. Follow up:12 weeks in-person visit. Please call [phone] to make your follow-up appointment with me.         Since his last visit, Jailene Breen reports:    Interval history November 23, 2022  -he is not currently on Enbrel, hoping get supply  this week  -having more joint pain off of Enbrel. Notices that that pain worsens the day before he is due for his injection. Also continues on Methotrexate.   -would like to increase Subutex dose. Had been on 16 mg a day of Suboxone, but reduced prior to surgery and then did not want to increase due to cost. Now on Subtex and since this is substantially more affordable would like to increase from current dose of 12 mg per day back to 16 mg per day as this was more effective for pain management.  - Using prn oxycodone when pain is increased during flares, primarily notes pain in lower back, and into his joints during flares  - Would like to join a gym with a pool, thinks that he could improve his strength and endurance if he were able to exert himself safely in the pool.   - He is planning on working on dietary changes in the new year.       Interval history August 31, 2022  -he is back on DMARDs for his RA. (Enbrel and methotrexate)  -low back pain now on both sides. No radiation to the legs  -multiple joint pain from RA/AK  -tripped over dog while carrying groceries up the stairs and he struck his right knee  -he notices he has been sweating more easily  -notes his weight is close to 300 lbs.     Interval history May 27, 2022  -having an arthritis flare as is currently off of his disease modifying agents. This has now been covered and he will begin this again soon (Enbrel).   -having pain in multiple joints and low back, see above.     Interval history February 4, 2022  -he awoke on 1/1/2022 at 3:30 AM and felt like he was going to die. He called the ambulance and went to the hospital. He was then diagnosed with COVID-19 on 1/3/2022 and has been feeling sick ever since. He has been nauseated and has not been able to take his RA DMARD.   -he has increased joint pain in all of his joints, low back pain and neck pain is worse. He cannot dress himself due to pain. States his finger joints are all swollen. He is using  "marijuana from [place] Cannabis program and prednisone from  Rheumatology.   -his rheumatologist is out of the clinic for a week. The covering rheumatologist on call felt he could re-start his rheumatology medication. Jamison is not comfortable re-starting his RA medication yet since he is still feeling sweaty and coughing and nauseated.     Interval history October 20, 2021  -he is hurting all over as he previously tapered off of the Suboxone in prep for his hernia surgery. This surgery was postponed due to no beds available due to COVID-19 pandemic. His pain is bad now with no pain medication on board.   -Jamison had preferred to taper off of Suboxone in prep for surgery despite discussing ability to medicate over it and maintain his previous dosing.   -explained that best course of action would be to get him back on Suboxone between 8-16mg/day during the perioperative period (he had been on 16mg/day in divided dosing prior to taper).   -Les did not previously feel that Suboxone was very helpful, but he does notice that his pain is worse being off of the Suboxone.   -he had his court case for disability recently and he states that his  feels that it went well, waiting to see if this ruling will be in his favor.     Interval history April 21, 2021  -he had COVID injection on Friday 4/16/2021 this was his 2nd injection. [place] crummy over the weekend.   -he is still dealing with the right hip pain that is the worst pain  -he is having left hip pain as well   -bilateral knee pain as well. It is hard for him to do stairs due to pain.   -he feels that the increase in Suboxone to 4/1mg QID has been a \"titch\" helpful.   -he does not find Voltaren gel or Aspercreme with 4% lidocaine helpful for his knee pain.  -he has finished the partial hospitalization program.  -he will be starting the adult day program for psychiatric care.     Interval history April 6, 2021  -Jamison states he has been feeling like \"hell.\"   -his low back is " "bothering him and it will radiate into his right groin and down his right leg to the foot.   -he is walking with a cane.   - He was hospitalized twice recently for suicidal ideation. Mood is still low. Denies SI   -he is attending the Partial Hospitalization program. This is all on Zoom. Lasts for 6 hours Monday through Friday.   -he is also working with his psychiatrist and psychologist in addition to that.  -he tries to walk a few times per week, he thinks this is about a half a mile or so.  -he also notes he tries not to go out much to avoid injury or over-activity.   -he is back on Enbrel and methotrexate. He notes that this is a bit helpful for his joint pain.   -he does not get any relief from the Subxone. He states it \"doesn't do anything.\" he relates he has been on OxyContin 20mg TID in the past. He is frustrated that I switched him off of Oxycodone/OxyContin when he was in the hospital for SI. We discussed again how being on full  Mu-agonist opiates are not a good option for him. Additionally, when we began working together on 3/31/2017, he was NOT on daily opiates. In the time we have worked together, he has gone from being on no opiates on a daily basis to #15 tabs/month of oxycodone to slowly increasing to Oxycodone 35mg/day in divided dosing using OxyContin 10mg BID and oxycodone 5mg TID PRN which is 52mg OME (oral morphine equivalent, also known as morphine milligram equivalent-MME). In that time, his functional level and his mood has deteriorated despite increasing opiate dosages. -  -discussed I am open to continuing Suboxone and recommend increasing to 4/1mg every 8 hours, a 50% increase. Patient stated that \"that isn't going to be high enough.\" when I asked what he felt might be helpful, he stated he felt the dosage should be tripled. Explained that doing so is not a safe choice. Additionally, discussed that Suboxone hits the peak pain relief (plateaus) at between 16-18mg/day as the mu-receptors " are all flooded.   He is concerned re: having upcoming surgery for a hiatal hernia in May. Reviewed that he can be on short-acting opiates as his surgeon deems necessary, but that he would be tapered off of them and continue on Suboxone with me long term.     Interval history February 16, 2021  -He has had more pain since the right  RFA done 2/1/2021.  -he slipped taking his garbage out last week, he tweaked his back, did not fall.    -he is off of his RA meds as he has an upper respiratory infection  -the extreme cold makes his arthritis pain worse  -he may be having a surgery to fix a hiatal hernia  -flexeril caused urinary retention, this was better when he stopped taking it.     Interval history 11/11/2020  -he has been having issues with emptying his bladder and he has had 2 Carrasco catheters in the near future  -he is trying to get in with a [place] [facility]ist.  -we discussed how opiate medication can cause urinary hesitancy  -he feels like the urinary symptoms began a couple of weeks after he began lexapro.  -he states he recently found out that his biological dad has had issues with his bladder.   -he had 2nd lumbar medial branch block done today with Dr. Ashlyn Gamble, we are working toward lumbar RFA..     Interval history 9/25/2020  -He has been sick 10 days ago,  had been on antibiotics and was not able to take his RA meds. He then felt better and then is now feeling worse again. Had been tested for COVID-19  -how his whole house is not feeling well.   -he is off of his RA meds, and so his pain is worse because he doesn't feel well, he has a really deep cough.  -having 2nd LMBB next week on the left side, then plan to do bilateral lumbar RFA  -he can't sleep due to body aches and then his back pain is worse.   -recently seen by psychiatry for first time this week, placed on escitalopram for depression/anxiety and hydroxyzine for panic/anxiety PRN, Les tells me that the psychiatrist wants him to stop using  "the clonazepam.     Interval history 6/10/2020  -having bilateral low back pain that radiates into the groin at times  -having multiple joint pain from RA, his RA flare is better as he is back on the Enbrel now  -he would love to get a new bed as he has issues getting comfortable in bed.   -he is having more left sided low back pain, worse with lumbar extension and extension and rotation. He is interested in pursing possible left sided lumbar RFA.    Interval 2020  -his 28 year old niece overdosed and  last Saturday, she had a 12,5 and 1 year old.   -he is going to her  today  -he did get his Enbrel yesterday  -he is going to see a new rheumatologist in July with the  [id number]    -he says his pain is now a bit better  -he has multiple joint pain from RA and chronic low back pain        At this point, the patient's participation with our multidisciplinary team includes:  The patient has been compliant with the program.  PT - last PHYSICAL THERAPY with Asif Saldana on 2020  Health Psych - worked with  Padilla Keene, last on 2018.  Working with Jonna Farrell PhD and been seen >8 times. Last visit with Santa was on 2020         Pain scores:    Pain intensity on average is 5-6 on a scale of 0-10.    Range is 5-10/10. (his pain can bring him to tears)  Pain right now is 9/10.   Pain is described as \"sharp, throbbing, stabbing, burning,  sometimes I feel like an ice pick is being stabbed in my lower back.\"  Pain is constant in nature      Current pain relevant medications:      -nortriptyline 50mg at bedtime (helpful)  -Enbrel 50mg/mL (not currently taking--should get supply soon)  -ibuprofen 800mg TID PRN (using 3 times daily-helpful)  -Tylenol 500mg using 1000mg TID (unsure if helpful)  -Maxalt 10mg PRN migraine (helpful for migraine--he does have visual aura)  -naloxone nasal spray PRN for accidental opiate overdose  -chlorzoxazone 500mg TID PRN (not using regularly, not much with " muscle spasms right now)  -Subutex 8 mg use 0.5 tab under the tongue three times daily (helps some)      Other pertinent medications:   -clonazepam 1mg tab, may take 0.5-1mg BID PRN anxiety (helpful, has been using infrequently)  Fluoxetine 40mg every day (somewhat helpful, managed by psychiatry)  -quetiapine 150mg at bedtime        Previous Medications:   OPIATES: Oxycodone (helpful), Fentanyl (100mcg/hr patch helpful), hydrocodone (itching), Tramadol (not helpful), Suboxone (initially felt it was not helpful, but after he tapered off of it due to his preference prior to surgery, he now feels that it was helpful for his pain)  NSAIDS: ibuprofen (not helpful), Aleve (not helpful)  MUSCLE RELAXANTS: methocarbamol (somewhat helpful), Flexeril (somewhat helpful but caused severe urinary retention requiring catheterization), tizanidine (unsure if helpful), metaxalone (unsure), SOMA (helpful)  ANTI-MIGRAINE MEDS: Maxalt (helpful for migraine), Imitrex injection (somewhat helpful)  ANTI-DEPRESSANTS: Prozac (helpful), Cymbalta (felt weird on it), nortriptyline (unsure if helpful), Buspar (unsure), Remeron (blacked out), bupropion (not helpful for smoking cessation)  SLEEP AIDS: Ambien (helpful)  ANTI-CONVULSANTS: gabapentin (felt odd on this medication, unsure of dose), Lyrica (not helpful for 4 months, unsure of dose), Topamax (not helpful, he felt weird on it)   TOPICALS: Lidocaine patches (not helpful), Voltaren gel (not helpful)  Other meds: Tylenol (unsure if helpful)        Other treatments have included:   Jailene Breen has been seen at a pain clinic in the past.  [facility]  PT: tried, not helpful  Chiropractic: tried, not helpful  Acupuncture: none  TENs Unit: tried, helpful         Injections:     -has had injections at outside clinics  -has had epidural injections for low back pain (one was helpful)  -he has had lumbar medial branch blocks at [facility] and he was going to have lumbar  "radiofrequency ablation (did not do this due to cost)  -4/3/2017 right LMBBs with Dr. Ashlyn Gamble (helpful)  -4/26/2017 right LMBBs with Dr. Ashlyn Gamble (helpful)  -5/31/2017 right L3, L4, L5 RFA with Dr. Ashlyn Gamble (good relief for over 6 months)  -3/15/2018 right L5 transforaminal MAKAYLA with Dr. Ashlyn Gamble (not helpful)  -5/10/2018 trigger point injections with Dr. Ashlyn Gamble(somewhat helpful).  -7/12/2018 Right L3, L4, L5 RFA with Dr. Ashlyn Gamble (helpful).  -9/20/2018 Left piriformis injections, bilateral gluteal trigger point with Dr. Gamble (helpful).  -1/31/2019 right L2-3, L3-4 and L4-5 facet joint injections with Dr. Ashlyn Gamble (somewhat helpful)  4/4/2019  right L3, L4, L5/sacral ala lumbar radiofrequency ablation with Dr. Gamble (helpful over right side)  6/28/2019 Bilateral facet joint injections at l4-5, L5-S1 with Dr. Holley (only helpful for 7 days)  -2/12/2020 right O4-B9-Q5-sacral ALA RFA done with Dr. Ashlyn Gamble (helpful)  -8/26/2020 left L3-5 lumbar medial branch blocks #1 with Dr. Ashlyn Gamble (very helpful)  -11/11/2020 left L3-5 lumbar medial branch blocks #2 with Dr. Ashlyn Gamble  (helpful)   -2/1/2021 bilateral lumbar RFA L4-5 and L5-S1 with Dr. Ashlyn Gamble   (this \"helped a little bit\" and then he tweaked his back about 2 weeks after it was done and it wasn't helpful)  -09/22/2022 lumbosacral RFA L4, L5 and sacral ala with Dr. Aguilar Holley (helpful,, at least 50% relief, he has a RA flare right now, so harder to tell)      THE 4 A's OF OPIOID MAINTENANCE ANALGESIA    Analgesia: Subutex is helpful as is oxycodone    Activity: he is looking in to joining a gym or doing some work into the pool     Adverse effects: none    Adherence to Rx protocol: yes          Side Effects: no side effect  Patient is using the medication as prescribed: YES    Medications:  Current Outpatient Medications   Medication Sig Dispense Refill     buprenorphine (SUBUTEX) 8 MG SUBL " sublingual tablet Place 0.5 tablets (4 mg) under the tongue 3 times daily Max 1.5 tablets per day. Fill 10/31/22 and start 11/02/22. 30 days for chronic pain 45 tablet 0     FLUoxetine (PROZAC) 20 MG capsule TAKE 3 CAPSULES BY MOUTH ONCE DAILY 90 capsule 0     folic acid (FOLVITE) 1 MG tablet Take 3 tablets (3 mg) by mouth daily 180 tablet 1     liothyronine (CYTOMEL) 25 MCG tablet Take 1 tablet (25 mcg) by mouth daily 30 tablet 0     medical cannabis (Patient's own supply) See Admin Instructions (The purpose of this order is to document that the patient reports taking medical cannabis.  This is not a prescription, and is not used to certify that the patient has a qualifying medical condition.)     Pills PRN   Lozenges PRN       methotrexate 2.5 MG tablet Take 5 tab once a week and following week increase to 6 tab once a week dose. 72 tablet 0     prazosin (MINIPRESS) 1 MG capsule TAKE 1 CAPSULE BY MOUTH AT BEDTIME 30 capsule 0     QUEtiapine (SEROQUEL) 50 MG tablet Take 1 tablet (50 mg) by mouth At Bedtime May take additional 25-50mg (1/2 - 1 tablet) daily prn for anxiety/panic attacks 45 tablet 2     vitamin D2 (ERGOCALCIFEROL) 97607 units (1250 mcg) capsule TAKE 1 CAPSULE BY MOUTH EVERY MONDAY AND THURSDAY       etanercept (ENBREL SURECLICK) 50 MG/ML autoinjector Inject 50 mg Subcutaneous once a week 1 mL 3       Medical History: any changes in medical history since they were last seen? No    Social History:    Home situation: , has 2 grown children  Occupation/Schooling: currently unemployed, worked as a  (unemployed since 3/1/2017). 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  Uses nicotine gum  If yes, was cessation counseling offered?  None needed        Physical Exam:     Vital signs: BP  138/85   Pulse 72      Behavioral observations:  Awake, alert and cooperative    Gait:  Slow, has single ended cane    Musculoskeletal exam:    Strength grossly equal throughout    Neuro exam: deferred    Skin/vascular/autonomic:  No suspicious lesions on exposed skin.     Other:  na    Is the patient hypertensive today? no  Hypertensive on recheck of BP?   na  If yes, was patient recommended to see Primary Care Provider in follow up for management of HTN?  na      [place] Prescription Monitoring Program:  Reviewed [facility] 11/23/2022- no concerning fills.  Susana GRANT, RN CNP, FNP  [facility]  [facility] [place]          DIRE Score for selecting candidates for long term opioid analgesia for chronic pain:  Diagnosis  2  Intractablility  2  Risk    Psychological health  1    Chemical health  2    Reliability  2    Social support  2  Efficacy  1  Total DIRE Score = 12. Note that  7-13 predicts poor outcome (compliance and efficacy) from opioid prescribing; 14-21 predicts good outcome (compliance and efficacy)  from opioid prescribing    Assessment:    1. Chronic bilateral low back pain without sciatica  2. Degenerative disc disease of the lumbar spine  3. Lumbar facet joint pain  4. Spondylosis of lumbar region without myelopathy or radiculopathy  5. Ankylosing spondylitis of lumbosacral region  6. Rheumatoid arthritis involving multiple joints with positive rheumatoid factor  7. Multiple joint pain  8. Right hip pain  9. Class I obesity due to excess calories without serious comorbidity with body mass index of 34-34.9 in adult  10. Uncomplicated opiate dependence  11. Chronic continuous use of opioids  12. Encounter for long-term use of opiate analgesic  13. PMHx includes: Panic attacks, back pain, arthritis, anxiety, ankylosing spondylitis  14. PSHx includes: Right knee surgery ×2, left foot surgery right knee arthroscopy      Plan:    1. Recommended to increase activity while  pacing himself  2. Physical Therapy:  Consider in the future  3. Clinical Health Psychologist: continue work with your psychiatrist and therapist  4. Diagnostic Studies:  None  5. Medication Management:    6. Starting today, you may use Subutex 8mg tabs 0.5-1 tab up to 4 times daily, max of 2 full tabs per day.   7. Oxycodone for pain flare short term  8. Continue medical cannabis   9. Further procedures recommended:none  10. You can reach out to the Comprehensive Weight [facility]  Program here whenever this is good for you. (previously deferred due to out of pocket costs)   [facility]   [address]   [facility] [address]   Phone: [phone]   Fax: [phone]     11. Recommendations to PCP: see above  12. Follow up:12 weeks in-person visit. Please call [phone] to make your follow-up appointment with me.       Ellen Marc, WILBER-BC, PMGT-BC, AP-PMN  [facility]      I saw and examined the patient with the Ellen Marc NP. I have reviewed and agree with the note and plan of care and made changes and corrections directly to the body of the note.    Face to face time: 34 minutes      Susana GRANT RN CNP, FNP  [facility]  [facility] [place]

## 2022-11-24 NOTE — TELEPHONE ENCOUNTER
Jamison called after hours. His regular [facility] does not have supply of oxycodone until Monday. He requests this be sent to the [facility] in [place] on [address]. Called pharmacy to ensure they have stock. Pharmacist stated they do and that it will take about an hour to fill once they receive script. Asked [facility] to cancel previous script sent to [facility].   Called Jamison back and relayed that his script for oxycodone should be ready at [facility] in [place] in about an hour.      Signed Prescriptions:                        Disp   Refills    oxyCODONE (ROXICODONE) 5 MG tablet         42 tab*0        Sig: Take 1 tablet (5 mg) by mouth every 6 hours as needed           for severe pain (7-10) Max of 3/day. Fill/begin           11/23/22. Short term script for acute pain flare.  Authorizing Provider: TAMICA PETTY, RN CNP, FNP  [facility]  [facility] [place]

## 2022-12-01 ENCOUNTER — MYC REFILL (OUTPATIENT)
Dept: PSYCHIATRY | Facility: CLINIC | Age: 57
End: 2022-12-01

## 2022-12-01 ENCOUNTER — VIRTUAL VISIT (OUTPATIENT)
Dept: PSYCHIATRY | Facility: CLINIC | Age: 57
End: 2022-12-01
Attending: PSYCHIATRY & NEUROLOGY
Payer: COMMERCIAL

## 2022-12-01 DIAGNOSIS — F33.1 MAJOR DEPRESSIVE DISORDER, RECURRENT EPISODE, MODERATE (H): Primary | ICD-10-CM

## 2022-12-01 DIAGNOSIS — F41.0 GENERALIZED ANXIETY DISORDER WITH PANIC ATTACKS: ICD-10-CM

## 2022-12-01 DIAGNOSIS — F33.2 SEVERE EPISODE OF RECURRENT MAJOR DEPRESSIVE DISORDER, WITHOUT PSYCHOTIC FEATURES (H): ICD-10-CM

## 2022-12-01 DIAGNOSIS — G47.00 INSOMNIA, UNSPECIFIED TYPE: ICD-10-CM

## 2022-12-01 DIAGNOSIS — F41.1 GENERALIZED ANXIETY DISORDER WITH PANIC ATTACKS: ICD-10-CM

## 2022-12-01 DIAGNOSIS — F41.1 GENERALIZED ANXIETY DISORDER: ICD-10-CM

## 2022-12-01 DIAGNOSIS — F51.05 INSOMNIA DUE TO OTHER MENTAL DISORDER: ICD-10-CM

## 2022-12-01 DIAGNOSIS — F99 INSOMNIA DUE TO OTHER MENTAL DISORDER: ICD-10-CM

## 2022-12-01 PROCEDURE — 99214 OFFICE O/P EST MOD 30 MIN: CPT | Mod: GT | Performed by: STUDENT IN AN ORGANIZED HEALTH CARE EDUCATION/TRAINING PROGRAM

## 2022-12-01 ASSESSMENT — PAIN SCALES - GENERAL: PAINLEVEL: EXTREME PAIN (8)

## 2022-12-01 NOTE — PROGRESS NOTES
Jailene Breen is a 57 year old who is being evaluated via a billable video visit.      PHQ not done per department protocol    Pt will join video visit via: Managed by Q  If there are problems joining the visit, send backup video invite via: Text to preferred phone: [phone]    Reason for telehealth visit: Patient has requested telehealth visit    Originating location (patient location): Patient's home    Will anyone else be joining the visit? Charley Narayanan

## 2022-12-01 NOTE — PROGRESS NOTES
Video- Visit Details  Type of service:  video visit for medication management  Time of service:    Video Start Time: 10:10 AM        Video End Time: 10:50 AM   Reason for video visit:  Patient convenience   Originating Site (patient location):  [facility] [place]   Location- Patient's home  Distant Site (provider location):  [facility]  Mode of Communication:  Video Conference via [employer]       [facility]  Psychiatry Clinic  MEDICAL PROGRESS NOTE     CARE TEAM:  PCP- Aiden Resendez    Psychotherapist- None       This person is a 57 year old who uses the name Jamison and pronouns he, him, his.      DIAGNOSIS     - Major depressive disorder, recurrent, moderate  - Generalized anxiety disorder, with panic  - R/O PTSD     Medical Diagnoses:  - Chronic pain  - Rheumatoid arthritis  - Ankylosing spondylitis ([id number] positive)  - Hypertension     ASSESSMENT     Mr. Breen reported his overall mood has been stable, and depressive symptoms have slightly improved. The current concerns are his poor sleep, early awakenings, and the feeling of not being adequately rested. A sleep medicine referral was made at the last visit. Patient agreed to follow up on this. He continues taking prazosin as scheduled to optimize its effects on his nightmares and sleep. Noted some benefits from this change.     He was interested in re-starting therapy; however, he discovered his insurance does not cover it. He continues to meet with his  every other week.     He noted shortness of breath and diaphoresis while doing minor activities - he was recommended to follow up with PCP to get a cardiac work-up, including EKG, echocardiogram, and stress test if needed. Patient was reminded to follow up with his PCP regarding his elevated BP also.     Denies suicidal ideation, any intent, or plan. No reports of medication side effects. If his symptoms worsen, we'll consider titration from fluoxetine to an SNRI.      The patient understands the risks, benefits, adverse effects, and alternatives. Agrees to treatment with the capacity to do so. No medical contraindications to treatment. Agrees to call the clinic for any problems. The patient understands to call 911 or come to the nearest ED if life-threatening or urgent symptoms present.    [place] review was not needed today.     PLAN                                                                                                                1) Medications:   - Continue fluoxetine 60 mg daily   - Continue quetiapine 50 mg at bedtime  - Continue quetiapine 25-50 mg as needed for anxiety and panic  - Continue liothyronine 25 mcg daily  - Continue prazosin 1 mg at bedtime (it appears that patient has been taking it as PRN, agreed to get a pill box and take it as scheduled.)    Other:  - Suboxone  - Methotrexate     2) Therapy-  Patient is interested; psychotherapy not covered by insurance    3) Next Due   Labs - Ordered previously, pending  EKG - as needed  Rating scales- AIMS     4) Referral - SW referral to explore psychotherapy resources    5) Other - Nurse partner check-in two weeks    6) RTC: 4-6 weeks     7) Crisis Numbers:   Provided routinely in AVS   After hours:  [phone]     PERTINENT BACKGROUND                                                    [most recent eval 07/13/22]     Originally, diagnosed with depression and anxiety in 2015 but first experienced symptoms of depression in middle school. He most recently had a significant worsening of symptoms in 2017 after he was diagnosed with rheumatoid arthritis, ankylosing spondylitis ended up with a knee injury and was no longer able to work or run which he used as his primary coping mechanism.    Pertinent Items Include: suicidal ideation, SIB [cutting], multiple psychotropic trials , severe med reaction, psych hosp (<3), SUBSTANCE USE: alcohol, substance use treatment  and Major Medical Problems (Rheumatoid  "Arthritis and Ankylosing Spondylitis)     SUBJECTIVE     Today:  - Mr. Breen reports that there have been no significant changes in his mood.   - Stated that recent events contributed to his current sad mood; they had to put his daughter's three years old dog after two major surgeries  - Family is \"sad and feels the emptiness.\"   - His son visited over the thanksgiving holiday; the family has had the chance to mourn together.  - No apparent changes in his depression; reports low energy, poor concentration, and memory; states that he feels at his baseline   - Plans to use a lightbox and continue to monitor his depressive symptoms  - Sleep still remains poor. Agreed to sleep medicine referral at the last visit; has yet to follow up.   - Interested in psychotherapy; however, his insurance would not cover it. Meets with his  every other week. Agreed to meet with SW to discuss other resources.   - Reports quetiapine helps with sleep.  - He will monitor BP and agreed to follow up with his PCP.   - Denies suicidal ideation, intent, or plan today; denies any safety concerns.    Recent Psych Symptoms:   Depression:  depressed mood, low energy, poor concentration /memory, feeling hopeless and feeling trapped, \"slight improvement since the last visit\"  Elevated:  none  Psychosis:  none  Anxiety:  excessive worry and nervous/overwhelmed, no panic attacks since the last visit  Trauma Related:  nightmares, flashbacks, negative beliefs / emotions and hypervigilance, nightmares about child trauma, lately he does not remember  Sleep: Yes, 4-6 hrs average (see above).    Adverse Effects:  denies  Pertinent Negative Symptoms: No suicidal ideation, violent ideation, aggression, psychosis, hallucinations, delusions, otto and hypomania    Recent Substance Use:     Alcohol- none since 2004, used to attend AA meetings prior to COVID, no plans to start  Tobacco- denies  Caffeine- no,    Cannabis- denies     Opioids- as " prescribed           Narcan Kit- prescribed   Other illicit drugs- none     FAMILY and SOCIAL HISTORY                                 pt reported     Family Hx:  Mom - depression (since  when sister )     Social Hx:  Financial/ Work- planning to attend [employer] for a degree in psychology; unable to work due to diagnosis of rheumatoid arthritis and ankylosing spondylitis, on disability   Partner/ -  in   Children- 26 and 28 year old  Living situation- [place], house with wife and daughter 27 yo at home, son is in [place], three dogs    Social/ Spiritual Support- Talks to  every two weeks, not many friends, family is supportive, likes walking and hiking     PAST PSYCHIATRIC HISTORY     SIB- Hx of cutting/carving of skin   Suicide Attempt [#, most recent]- No   Suicidal Ideation Hx- No   Violence/Aggression Hx- No, not now  Psychosis Hx- No   Eating Disorder Hx- No   Psych Hosp [#, most recent]- Yes, 3-4 times, last one a few months, 10 days longest  Commitment- No   TMS/ECT- No   Outpatient Programs - Yes, CD treatment at [facility], in patient and outpatient program     PAST MED TRIALS       Medication  Dose   (mg) Effect  Dates of Use   Fluoxetine 40 Felt like was initially helpful  -    Sertraline 100 Effective initially, eventually self discontinued as not helpful 2020   Duloxetine 30 BID Used to treat nerve pain; felt weird on it    Bupropion  BID ineffective  -    Nortriptyline 50 For pain 2017 -  3/2019   Mirtazapine   Dissociated for entire first week after taking    Trazodone 50                Hydroxyzine 25 QID prn For anxiety and panic attacks; not effective for severe anxiety  -    Buspirone 15 TID    -    Gabapentin 100 TID    -    Alprazolam 0.5 TID For anxiety 5937-1487   Diazepam 2 BID For anxiety 2016   Lorazepam 1 TID For anxiety 2016   Clonazepam 1 For anxiety 2016 - present       PSYCH CRITICAL  SUMMARY POINTS         [From Dr. Sgae's Notes on 6/8/2022:  9/20 - started escitalopram  11/20 - increased escitalopram 20mg daily  2/21 - stopped hydroxyzine; started trazodone 50mg at bedtime and 25mg daily as needed for anxiety  3/21 - 4/22 - hospitalized started on Abilify, titrated to 5mg daily; escitalopram switched to fluoxetine 40mg daily; started prazosin 1mg at bedtime. Pain management switched oxycodone to Suboxone and started medical marijuana.  5/21  - patient still taking hydroxyzine, discontinued today.  6/21  - patient hospitalized (6/15/21) discontinued Abilify and trazodone, started on quetiapine & titrated to 100mg at bedtime and 25mg as needed for sleep.   8/21 - increase quetiapine 150mg   11/21  - discontinued quetiapine 150mg (patient ran out of medications and did not take for several weeks).  12/21 - patient restarted quetiapine 50mg at bedtime   3/22 - increased fluoxetine 60mg]    7/14/22  - changed prazosin from PRN to scheduled at bedtime  11/3/202: no medication changes. Elevated BP history discussed. Patient agreed to make a PCP appointment.   12/1/2022: No medication changes.      MEDICAL HISTORY and ALLERGY     ALLERGIES: Mirtazapine, Hydrocodone, Ms contin [morphine], and Remeron soltab    Patient Active Problem List   Diagnosis     CARDIOVASCULAR SCREENING; LDL GOAL LESS THAN 160     Obesity, Class I, BMI 30-34.9     Tear meniscus knee, right, initial encounter     Rheumatoid arthritis involving multiple sites, unspecified rheumatoid factor presence     Chronic pain     Right-sided thoracic back pain, unspecified chronicity     Generalized anxiety disorder     Panic attack     Ankylosing spondylitis (H)     Tobacco use disorder     Moderate major depression (H)     Immunocompromised (H)     Essential hypertension with goal blood pressure less than 140/90     Suicidal ideation     Insomnia due to other mental disorder     Major depressive disorder, recurrent episode, severe with  mixed features (H)     Chronic back pain greater than 3 months duration     Contact dermatitis and eczema     Dermatitis     Drug dependence (H)     Encounter for screening colonoscopy     Esophageal reflux     Hematuria     Hyperlipidemia     Lumbar radiculopathy     Plantar fascial fibromatosis     Sprain of sacroiliac region     Overweight     Nonintractable migraine     Narcotic abuse, continuous (H)        MEDICAL REVIEW OF SYSTEMS     Contraception- none    A comprehensive review of systems was performed and is negative other than noted in the HPI.      MEDICATIONS     Current Outpatient Medications   Medication Sig Dispense Refill     FLUoxetine (PROZAC) 20 MG capsule Take 3 capsules (60 mg) by mouth daily 90 capsule 1     liothyronine (CYTOMEL) 25 MCG tablet Take 1 tablet (25 mcg) by mouth daily 30 tablet 1     prazosin (MINIPRESS) 1 MG capsule Take 1 capsule (1 mg) by mouth At Bedtime 30 capsule 1     QUEtiapine (SEROQUEL) 50 MG tablet Take 1 tablet (50 mg) by mouth At Bedtime May take additional 25-50mg (1/2 - 1 tablet) daily prn for anxiety/panic attacks 45 tablet 1     buprenorphine (SUBUTEX) 8 MG SUBL sublingual tablet Place 0.5-1 tablets (4-8 mg) under the tongue 4 times daily Max 2 tabs per day. Fill 11/28/2022 and begin on 11/30/2022. Allow early fill as I increased patient dosage with the last bit of previous script 60 tablet 0     buprenorphine (SUBUTEX) 8 MG SUBL sublingual tablet Place 0.5 tablets (4 mg) under the tongue 3 times daily Max 1.5 tablets per day. Fill 10/31/22 and start 11/02/22. 30 days for chronic pain 45 tablet 0     etanercept (ENBREL SURECLICK) 50 MG/ML autoinjector Inject 50 mg Subcutaneous once a week 1 mL 3     folic acid (FOLVITE) 1 MG tablet Take 3 tablets (3 mg) by mouth daily 180 tablet 1     medical cannabis (Patient's own supply) See Admin Instructions (The purpose of this order is to document that the patient reports taking medical cannabis.  This is not a  "prescription, and is not used to certify that the patient has a qualifying medical condition.)     Pills PRN   Lozenges PRN       methotrexate 2.5 MG tablet Take 5 tab once a week and following week increase to 6 tab once a week dose. 72 tablet 0     oxyCODONE (ROXICODONE) 5 MG tablet Take 1 tablet (5 mg) by mouth every 6 hours as needed for severe pain (7-10) Max of 3/day. Fill/begin 11/23/22. Short term script for acute pain flare. 42 tablet 0     vitamin D2 (ERGOCALCIFEROL) 78116 units (1250 mcg) capsule TAKE 1 CAPSULE BY MOUTH EVERY MONDAY AND THURSDAY        VITALS   There were no vitals taken for this visit.     Pulse Readings from Last 5 Encounters:   11/23/22 72   09/22/22 66   08/31/22 65   05/02/22 72   03/16/22 67     Wt Readings from Last 5 Encounters:   05/02/22 122.7 kg (270 lb 8 oz)   03/16/22 125.3 kg (276 lb 3.2 oz)   02/16/22 122.5 kg (270 lb)   12/30/21 123 kg (271 lb 2 oz)   10/11/21 119.7 kg (264 lb)     BP Readings from Last 5 Encounters:   11/23/22 138/85   09/22/22 (!) 176/88   08/31/22 (!) 154/95   05/02/22 (!) 163/90   03/16/22 (!) 143/87        MENTAL STATUS EXAM     Alertness: alert , oriented and sleepy  Appearance: casually groomed, limited obs by video visit  Behavior/Demeanor: cooperative, pleasant and calm, with poor eye contact   Speech: normal and regular rate and rhythm  Language: intact and no problems  Psychomotor: normal or unremarkable and based on video visit, no abnormal moveemnts or tics were observed  Mood: \"mostly same, and fluctuates\"   Affect: guarded; congruent to: mood- yes, content- yes  Thought Process/Associations: unremarkable  Thought Content:  Reports none;  Denies suicidal & violent ideation and delusions, denies any plan or intent  Perception:  Reports none;  Denies auditory hallucinations and visual hallucinations  Insight: good  Judgment: fair  Cognition: does  appear grossly intact; formal cognitive testing was not done  Gait and Station: N/A " (telehealth)     LABS and DATA     PHQ 6/6/2022 7/14/2022 11/3/2022   PHQ-9 Total Score 15 16 14   Q9: Thoughts of better off dead/self-harm past 2 weeks More than half the days Several days Not at all   F/U: Thoughts of suicide or self-harm No No -   F/U: Self harm-plan - - -   F/U: Self-harm action - - -   F/U: Safety concerns No No -       Recent Labs   Lab Test 08/31/22  1405 10/11/21  1123   CR 0.94 0.91   GFRESTIMATED >90 >90     Recent Labs   Lab Test 08/31/22  1405 10/11/21  1123 06/16/21  0741   AST 35 37 23   ALT 46 47 33   ALKPHOS  --  102 88       Recent Labs   Lab Test 10/11/21  1123 06/16/21  0741   GLC 97 92   A1C 5.2  --      Recent Labs   Lab Test 10/11/21  1123   CHOL 209*   TRIG 225*   *   HDL 30*     Recent Labs   Lab Test 08/31/22  1405 10/11/21  1123 06/16/21  0741 03/21/21  0808 03/17/21  0748   WBC 6.0 7.0   < > 6.8 8.3   ANEU  --   --   --  3.7 4.4   HGB 15.1 16.2   < > 15.6 15.0    232   < > 246 211    < > = values in this interval not displayed.     TSH   Date Value Ref Range Status   05/02/2022 1.46 0.40 - 4.00 mU/L Final   10/11/2021 1.37 0.40 - 4.00 mU/L Final   06/16/2021 0.65 0.40 - 4.00 mU/L Final   03/21/2021 2.39 0.40 - 4.00 mU/L Final   03/17/2021 2.01 0.40 - 4.00 mU/L Final   09/30/2016 1.62 0.40 - 4.00 mU/L Final     ECG 6/15/2021 QTc = 413 ms     PSYCHOTROPIC DRUG INTERACTIONS                                                       PSYCHCLINICDDI     Increased risk of QTc prolongation: fluoxetine, quetiapine, Suboxone  Increased risk of serotonin syndrome: fluoxetine, Suboxone  Increased risk of CNS/respiratory depression: Suboxone, quetiapine    Prazosin and buprenorphine may have additive effects in lowering your blood pressure.    MANAGEMENT:  Monitoring for adverse effects and patient is aware of risks     RISK STATEMENT for SAFETY     Mr. Breen did not appear to be an imminent safety risk to self or others.     TREATMENT RISK STATEMENT: The risks, benefits,  alternatives and potential adverse effects have been discussed and are understood by the pt. The pt understands the risks of using street drugs or alcohol. There are no medical contraindications, the pt agrees to treatment with the ability to do so. The pt knows to call the clinic for any problems or to access emergency care if needed.  Medical and substance use concerns are documented above.  Psychotropic drug interaction check was done, including changes made today.     PROVIDER: Katie Corley MD, PhD    Patient staffed in clinic with Dr. Downing who will sign the note.  Supervisor is Dr. Hunter.

## 2022-12-01 NOTE — NURSING NOTE
Patient denies any changes since echeck-in regarding medication and allergies and states all information entered during echeck-in remains accurate.    Maury Narayanan

## 2022-12-01 NOTE — PATIENT INSTRUCTIONS
**For crisis resources, please see the information at the end of this document**   Patient Education    Thank you for coming to the [facility] [place] [facility].     Lab Testing:  If you had lab testing today and your results are reassuring or normal they will be mailed to you or sent through [employer] within 7 days. If the lab tests need quick action we will call you with the results. The phone number we will call with results is # [phone]. If this is not the best number please call our clinic and change the number.     Medication Refills:  If you need any refills please call your pharmacy and they will contact us. Our fax number for refills is [phone].   Three business days of notice are needed for general medication refill requests.   Five business days of notice are needed for controlled substance refill requests.   If you need to change to a different pharmacy, please contact the new pharmacy directly. The new pharmacy will help you get your medications transferred.     Contact Us:  Please call [phone] during business hours (8-5:00 M-F).   If you have medication related questions after clinic hours, or on the weekend, please call [phone].     Financial Assistance [phone]   Medical Records [phone]       MENTAL HEALTH CRISIS RESOURCES:  For a emergency help, please call 911 or go to the nearest Emergency Department.     Emergency Walk-In Options:   EmPATH Unit @ [facility] ([place]): [phone] - Specialized mental health emergency area designed to be calming  [facility] ([place]): [phone]  [facility] ([place]): [phone]  [facility]): [phone]    [facility] Crisis Information:   [place]: [phone]  [place]: [phone]  [place] ([place]) - Adult: [phone]     Child: [phone]  [place] - Adult: [phone]     Child: [phone]  [place]:  [phone]  List of all [facility] resources:   https://[place].gov/dhs/people-we-serve/adults/health-care/mental-health/resources/crisis-contacts.jsp    National Crisis Information:   Crisis Text Line: Text  [place]  to 583857  Suicide & Crisis Lifeline: 988  National Suicide Prevention Lifeline: 2-447-900-TALK (1-999.185.5000)       For online chat options, visit https://suicidepreventionlifeline.org/chat/  Poison Control Center: 5-568-168-0475  Trans Lifeline: 0-808-835-9384 - Hotline for transgender people of all ages  The Tu Project: 5-135-997-1037 - Hotline for LGBT youth     For Non-Emergency Support:   Fast Tracker: Mental Health & Substance Use Disorder Resources -   https://www.[employer] [place].org/

## 2022-12-02 RX ORDER — LIOTHYRONINE SODIUM 25 UG/1
25 TABLET ORAL DAILY
Qty: 30 TABLET | Refills: 1 | Status: SHIPPED | OUTPATIENT
Start: 2022-12-02 | End: 2022-12-02

## 2022-12-02 RX ORDER — PRAZOSIN HYDROCHLORIDE 1 MG/1
1 CAPSULE ORAL AT BEDTIME
Qty: 30 CAPSULE | Refills: 1 | Status: SHIPPED | OUTPATIENT
Start: 2022-12-02 | End: 2023-01-05

## 2022-12-02 RX ORDER — PRAZOSIN HYDROCHLORIDE 1 MG/1
1 CAPSULE ORAL AT BEDTIME
Qty: 30 CAPSULE | Refills: 1 | Status: SHIPPED | OUTPATIENT
Start: 2022-12-02 | End: 2022-12-02

## 2022-12-02 RX ORDER — LIOTHYRONINE SODIUM 25 UG/1
25 TABLET ORAL DAILY
Qty: 30 TABLET | Refills: 1 | Status: SHIPPED | OUTPATIENT
Start: 2022-12-02 | End: 2023-01-05

## 2022-12-02 RX ORDER — QUETIAPINE FUMARATE 50 MG/1
50 TABLET, FILM COATED ORAL AT BEDTIME
Qty: 45 TABLET | Refills: 1 | Status: SHIPPED | OUTPATIENT
Start: 2022-12-02 | End: 2023-01-05

## 2022-12-02 RX ORDER — QUETIAPINE FUMARATE 50 MG/1
50 TABLET, FILM COATED ORAL AT BEDTIME
Qty: 45 TABLET | Refills: 1 | Status: SHIPPED | OUTPATIENT
Start: 2022-12-02 | End: 2022-12-02

## 2022-12-08 ENCOUNTER — OFFICE VISIT (OUTPATIENT)
Dept: FAMILY MEDICINE | Facility: CLINIC | Age: 57
End: 2022-12-08
Payer: COMMERCIAL

## 2022-12-08 VITALS
HEIGHT: 76 IN | HEART RATE: 89 BPM | SYSTOLIC BLOOD PRESSURE: 133 MMHG | WEIGHT: 290 LBS | TEMPERATURE: 98.9 F | RESPIRATION RATE: 18 BRPM | OXYGEN SATURATION: 96 % | DIASTOLIC BLOOD PRESSURE: 85 MMHG | BODY MASS INDEX: 35.31 KG/M2

## 2022-12-08 DIAGNOSIS — Z00.00 ROUTINE GENERAL MEDICAL EXAMINATION AT A HEALTH CARE FACILITY: Primary | ICD-10-CM

## 2022-12-08 DIAGNOSIS — F33.2 SEVERE EPISODE OF RECURRENT MAJOR DEPRESSIVE DISORDER, WITHOUT PSYCHOTIC FEATURES (H): ICD-10-CM

## 2022-12-08 DIAGNOSIS — Z79.899 ENCOUNTER FOR LONG-TERM CURRENT USE OF MEDICATION: ICD-10-CM

## 2022-12-08 DIAGNOSIS — R53.81 PHYSICAL DECONDITIONING: ICD-10-CM

## 2022-12-08 DIAGNOSIS — E78.5 DYSLIPIDEMIA: ICD-10-CM

## 2022-12-08 DIAGNOSIS — F33.1 MAJOR DEPRESSIVE DISORDER, RECURRENT EPISODE, MODERATE (H): ICD-10-CM

## 2022-12-08 DIAGNOSIS — F33.2 MAJOR DEPRESSIVE DISORDER, RECURRENT EPISODE, SEVERE WITH MIXED FEATURES (H): ICD-10-CM

## 2022-12-08 DIAGNOSIS — Z79.899 HIGH RISK MEDICATION USE: ICD-10-CM

## 2022-12-08 DIAGNOSIS — Z51.81 ENCOUNTER FOR THERAPEUTIC DRUG MONITORING: ICD-10-CM

## 2022-12-08 DIAGNOSIS — E55.9 VITAMIN D DEFICIENCY: ICD-10-CM

## 2022-12-08 DIAGNOSIS — Z13.21 ENCOUNTER FOR VITAMIN DEFICIENCY SCREENING: ICD-10-CM

## 2022-12-08 DIAGNOSIS — E66.01 MORBID OBESITY (H): ICD-10-CM

## 2022-12-08 DIAGNOSIS — M06.9 RHEUMATOID ARTHRITIS INVOLVING MULTIPLE SITES, UNSPECIFIED WHETHER RHEUMATOID FACTOR PRESENT (H): ICD-10-CM

## 2022-12-08 PROBLEM — F17.200 TOBACCO USE DISORDER: Status: RESOLVED | Noted: 2017-06-19 | Resolved: 2022-12-08

## 2022-12-08 LAB
ALBUMIN SERPL-MCNC: 4 G/DL (ref 3.4–5)
ALP SERPL-CCNC: 112 U/L (ref 40–150)
ALT SERPL W P-5'-P-CCNC: 49 U/L (ref 0–70)
ANION GAP SERPL CALCULATED.3IONS-SCNC: 5 MMOL/L (ref 3–14)
AST SERPL W P-5'-P-CCNC: 32 U/L (ref 0–45)
BASOPHILS # BLD AUTO: 0 10E3/UL (ref 0–0.2)
BASOPHILS NFR BLD AUTO: 1 %
BILIRUB SERPL-MCNC: 0.6 MG/DL (ref 0.2–1.3)
BUN SERPL-MCNC: 16 MG/DL (ref 7–30)
CALCIUM SERPL-MCNC: 9.1 MG/DL (ref 8.5–10.1)
CHLORIDE BLD-SCNC: 107 MMOL/L (ref 94–109)
CHOLEST SERPL-MCNC: 224 MG/DL
CO2 SERPL-SCNC: 29 MMOL/L (ref 20–32)
CREAT SERPL-MCNC: 0.98 MG/DL (ref 0.66–1.25)
CRP SERPL-MCNC: <2.9 MG/L (ref 0–8)
EOSINOPHIL # BLD AUTO: 0.3 10E3/UL (ref 0–0.7)
EOSINOPHIL NFR BLD AUTO: 3 %
ERYTHROCYTE [DISTWIDTH] IN BLOOD BY AUTOMATED COUNT: 12.6 % (ref 10–15)
ERYTHROCYTE [SEDIMENTATION RATE] IN BLOOD BY WESTERGREN METHOD: 12 MM/HR (ref 0–20)
FASTING STATUS PATIENT QL REPORTED: YES
GFR SERPL CREATININE-BSD FRML MDRD: >90 ML/MIN/1.73M2
GLUCOSE BLD-MCNC: 96 MG/DL (ref 70–99)
HBA1C MFR BLD: 5.5 % (ref 0–5.6)
HCT VFR BLD AUTO: 44.9 % (ref 40–53)
HDLC SERPL-MCNC: 25 MG/DL
HGB BLD-MCNC: 15.6 G/DL (ref 13.3–17.7)
IMM GRANULOCYTES # BLD: 0 10E3/UL
IMM GRANULOCYTES NFR BLD: 0 %
LDLC SERPL CALC-MCNC: 153 MG/DL
LYMPHOCYTES # BLD AUTO: 1.6 10E3/UL (ref 0.8–5.3)
LYMPHOCYTES NFR BLD AUTO: 21 %
MCH RBC QN AUTO: 29.4 PG (ref 26.5–33)
MCHC RBC AUTO-ENTMCNC: 34.7 G/DL (ref 31.5–36.5)
MCV RBC AUTO: 85 FL (ref 78–100)
MONOCYTES # BLD AUTO: 0.4 10E3/UL (ref 0–1.3)
MONOCYTES NFR BLD AUTO: 6 %
NEUTROPHILS # BLD AUTO: 5.3 10E3/UL (ref 1.6–8.3)
NEUTROPHILS NFR BLD AUTO: 69 %
NONHDLC SERPL-MCNC: 199 MG/DL
PLATELET # BLD AUTO: 225 10E3/UL (ref 150–450)
POTASSIUM BLD-SCNC: 4.4 MMOL/L (ref 3.4–5.3)
PROT SERPL-MCNC: 8.2 G/DL (ref 6.8–8.8)
RBC # BLD AUTO: 5.31 10E6/UL (ref 4.4–5.9)
SODIUM SERPL-SCNC: 141 MMOL/L (ref 133–144)
TRIGL SERPL-MCNC: 230 MG/DL
TSH SERPL DL<=0.005 MIU/L-ACNC: 1.19 MU/L (ref 0.4–4)
WBC # BLD AUTO: 7.7 10E3/UL (ref 4–11)

## 2022-12-08 PROCEDURE — 80053 COMPREHEN METABOLIC PANEL: CPT | Performed by: PHYSICIAN ASSISTANT

## 2022-12-08 PROCEDURE — 99396 PREV VISIT EST AGE 40-64: CPT | Performed by: PHYSICIAN ASSISTANT

## 2022-12-08 PROCEDURE — 84443 ASSAY THYROID STIM HORMONE: CPT | Performed by: PHYSICIAN ASSISTANT

## 2022-12-08 PROCEDURE — 93000 ELECTROCARDIOGRAM COMPLETE: CPT | Performed by: PHYSICIAN ASSISTANT

## 2022-12-08 PROCEDURE — 36415 COLL VENOUS BLD VENIPUNCTURE: CPT | Performed by: PHYSICIAN ASSISTANT

## 2022-12-08 PROCEDURE — 80061 LIPID PANEL: CPT

## 2022-12-08 PROCEDURE — 85652 RBC SED RATE AUTOMATED: CPT | Performed by: PHYSICIAN ASSISTANT

## 2022-12-08 PROCEDURE — 85025 COMPLETE CBC W/AUTO DIFF WBC: CPT | Performed by: PHYSICIAN ASSISTANT

## 2022-12-08 PROCEDURE — 96127 BRIEF EMOTIONAL/BEHAV ASSMT: CPT | Performed by: PHYSICIAN ASSISTANT

## 2022-12-08 PROCEDURE — 82306 VITAMIN D 25 HYDROXY: CPT | Performed by: PHYSICIAN ASSISTANT

## 2022-12-08 PROCEDURE — 83036 HEMOGLOBIN GLYCOSYLATED A1C: CPT

## 2022-12-08 PROCEDURE — 99213 OFFICE O/P EST LOW 20 MIN: CPT | Mod: 25 | Performed by: PHYSICIAN ASSISTANT

## 2022-12-08 PROCEDURE — 86140 C-REACTIVE PROTEIN: CPT | Performed by: PHYSICIAN ASSISTANT

## 2022-12-08 ASSESSMENT — PAIN SCALES - GENERAL: PAINLEVEL: EXTREME PAIN (8)

## 2022-12-08 ASSESSMENT — ENCOUNTER SYMPTOMS
NAUSEA: 0
SORE THROAT: 0
CHILLS: 0
COUGH: 0
HEARTBURN: 0
DYSURIA: 0
PALPITATIONS: 0
ABDOMINAL PAIN: 0
MYALGIAS: 1
DIZZINESS: 0
PARESTHESIAS: 0
WEAKNESS: 1
ARTHRALGIAS: 1
EYE PAIN: 0
HEMATOCHEZIA: 1
FREQUENCY: 1
SHORTNESS OF BREATH: 1
JOINT SWELLING: 1
DIARRHEA: 0
FEVER: 0
CONSTIPATION: 1
NERVOUS/ANXIOUS: 1
HEADACHES: 1
HEMATURIA: 0

## 2022-12-08 ASSESSMENT — PATIENT HEALTH QUESTIONNAIRE - PHQ9
10. IF YOU CHECKED OFF ANY PROBLEMS, HOW DIFFICULT HAVE THESE PROBLEMS MADE IT FOR YOU TO DO YOUR WORK, TAKE CARE OF THINGS AT HOME, OR GET ALONG WITH OTHER PEOPLE: VERY DIFFICULT
SUM OF ALL RESPONSES TO PHQ QUESTIONS 1-9: 14
SUM OF ALL RESPONSES TO PHQ QUESTIONS 1-9: 14

## 2022-12-08 ASSESSMENT — ACTIVITIES OF DAILY LIVING (ADL): CURRENT_FUNCTION: NO ASSISTANCE NEEDED

## 2022-12-08 NOTE — CONFIDENTIAL NOTE
He pushed the wrong button on the abuse screen, no threats    Abuse Screening Follow Up    Abuse Screen  Do you feel safe with the people in your home?: Yes  Do you feel safe in your relationships?: Yes  Have there been threats or direct abuse of you or your children?: (!) Yes  No flowsheet data found.  No flowsheet data found.  Summary of concern:He pushed the wrong button on the abuse screen, no threats    Follow Up  see above , no issues

## 2022-12-08 NOTE — PROGRESS NOTES
"SUBJECTIVE:   Jamison is a 57 year old who presents for Preventive Visit.  Patient has been advised of split billing requirements and indicates understanding: Yes  Are you in the first 12 months of your Medicare coverage?  No    Healthy Habits:     In general, how would you rate your overall health?  Poor    Frequency of exercise:  1 day/week    Duration of exercise:  15-30 minutes    Do you usually eat at least 4 servings of fruit and vegetables a day, include whole grains    & fiber and avoid regularly eating high fat or \"junk\" foods?  Yes    Taking medications regularly:  Yes    Medication side effects:  None    Ability to successfully perform activities of daily living:  No assistance needed    Home Safety:  No safety concerns identified    Hearing Impairment:  Difficulty following a conversation in a noisy restaurant or crowded room, feel that people are mumbling or not speaking clearly, difficulty understanding soft or whispered speech and difficulty understanding speech on the telephone    In the past 6 months, have you been bothered by leaking of urine?  No    In general, how would you rate your overall mental or emotional health?  Poor      PHQ-2 Total Score: 6    Additional concerns today:  Yes      Have you ever done Advance Care Planning? (For example, a Health Directive, POLST, or a discussion with a medical provider or your loved ones about your wishes): No, advance care planning information given to patient to review.  Patient plans to discuss their wishes with loved ones or provider.       Has been feeling deconditioned.  Goes out for walks a couple times a week but limited in duration due to knee and back pain,  Will get these random hot flashes with sweating, happens 1-2 times a day for months, no longer smoking.  Orders in form psych and rheum for labs, denies chest pain, EKG was 6/12 was mild denny,   Normal stress test 2018, father has had cardiac stents, maybe started at 65    He pushed the wrong " button on the abuse screen, no threats    Fall risk     No falls    Cognitive Screening   1) Repeat 3 items (Leader, Season, Table)    2) Clock draw: NORMAL  3) 3 item recall: Recalls 1 object   Results: NORMAL clock, 1-2 items recalled: COGNITIVE IMPAIRMENT LESS LIKELY    Mini-CogTM Copyright CHANCE Chavez. Licensed by the author for use in [facility]; reprinted with permission (ambrose@[facility]). All rights reserved.      Identified Health Risks:  Answers for HPI/ROS submitted by the patient on 12/8/2022  If you checked off any problems, how difficult have these problems made it for you to do your work, take care of things at home, or get along with other people?: Very difficult  PHQ9 TOTAL SCORE: 14  He is following with psych     Reviewed and updated as needed this visit by clinical staff    Allergies  Meds              Reviewed and updated as needed this visit by Provider                 Social History     Tobacco Use     Smoking status: Never     Smokeless tobacco: Former     Types: Chew     Quit date: 12/12/2019     Tobacco comments:     still chews nicotine gum   Substance Use Topics     Alcohol use: No     Comment: none         Alcohol Use 12/8/2022   Prescreen: >3 drinks/day or >7 drinks/week? No          Current providers sharing in care for this patient include:    Patient Care Team:  Aiden Resendez PA as PCP - General (Physician Assistant)  Doretha Magallanes MD as MD (Anesthesiology)  Asif Luu DO as MD (Family Medicine - Sports Medicine)  Fredo Hughes MD as MD (Orthopedics)  Aiden Resendez PA as Assigned PCP  Barbie Christie, PhD LP as Psychologist (Licensed Mental Health)  Susana Pike APRN CNP as Referring Physician (Nurse Practitioner)  Raghu Parada MD as Assigned Rheumatology Provider  Brandon Mack MD as Assigned Surgical Provider  Susana Pike APRN CNP as Assigned Neuroscience Provider    The following  health maintenance items are reviewed in Epic and correct as of today:  Health Maintenance   Topic Date Due     DEPRESSION ACTION PLAN  Never done     HEPATITIS B IMMUNIZATION (1 of 3 - 3-dose series) Never done     MEDICARE ANNUAL WELLNESS VISIT  Never done     COVID-19 Vaccine (3 - Moderna risk series) 10/26/2022     ANNUAL REVIEW OF HM ORDERS  12/30/2022     URINE DRUG SCREEN  08/31/2023     Pneumococcal Vaccine: Pediatrics (0 to 5 Years) and At-Risk Patients (6 to 64 Years) (2 - PCV) 11/25/2023     PHQ-9  12/08/2023     LIPID  10/11/2026     ADVANCE CARE PLANNING  12/08/2027     COLORECTAL CANCER SCREENING  05/09/2028     DTAP/TDAP/TD IMMUNIZATION (4 - Td or Tdap) 09/12/2031     HEPATITIS C SCREENING  Completed     HIV SCREENING  Completed     INFLUENZA VACCINE  Completed     ZOSTER IMMUNIZATION  Completed     IPV IMMUNIZATION  Aged Out     MENINGITIS IMMUNIZATION  Aged Out     LUNG CANCER SCREENING  Discontinued          Review of Systems   Constitutional: Negative for chills and fever.   HENT: Positive for congestion and hearing loss. Negative for ear pain and sore throat.    Eyes: Negative for pain and visual disturbance.   Respiratory: Positive for shortness of breath. Negative for cough.    Cardiovascular: Positive for peripheral edema. Negative for chest pain and palpitations.   Gastrointestinal: Positive for constipation and hematochezia. Negative for abdominal pain, diarrhea, heartburn and nausea.   Genitourinary: Positive for frequency and impotence. Negative for dysuria, genital sores, hematuria, penile discharge and urgency.   Musculoskeletal: Positive for arthralgias, joint swelling and myalgias.   Skin: Negative for rash.   Neurological: Positive for weakness and headaches. Negative for dizziness and paresthesias.   Psychiatric/Behavioral: Positive for mood changes. The patient is nervous/anxious.         OBJECTIVE:   /85   Pulse 89   Temp 98.9  F (37.2  C) (Tympanic)   Resp 18   Ht 1.93 m  "(6' 4\")   Wt 131.5 kg (290 lb)   SpO2 96%   BMI 35.30 kg/m   Estimated body mass index is 35.3 kg/m  as calculated from the following:    Height as of this encounter: 1.93 m (6' 4\").    Weight as of this encounter: 131.5 kg (290 lb).  Physical Exam  GENERAL: healthy, alert and no distress  EYES: Eyes grossly normal to inspection, PERRL and conjunctivae and sclerae normal  HENT: ear canals and TM's normal, nose and mouth without ulcers or lesions  NECK: no adenopathy, no asymmetry, masses, or scars a RESP: lungs clear to auscultation - no rales, rhonchi or wheezes  CV: regular rate and rhythm, normal S1 S2, no S3 or S4, no murmur, click or rub,  ABDOMEN: soft, nontender, no hepatosplenomegaly, no masses and bowel sounds normal  MS: no gross musculoskeletal defects noted, no edema  SKIN: no suspicious lesions or rashes  NEURO: Normal strength and tone, mentation intact and speech normal  PSYCH: mentation appears normal, affect normal/bright        ASSESSMENT / PLAN:   Jailene was seen today for physical, lab only and ear problem.    Diagnoses and all orders for this visit:    Routine general medical examination at a health care facility    Morbid obesity (H)    Physical deconditioning  -     EKG 12-lead complete w/read - Clinics    Major depressive disorder, recurrent episode, severe with mixed features (H)  -     EMOTIONAL / BEHAVIORAL ASSESSMENT    Other orders  -     REVIEW OF HEALTH MAINTENANCE PROTOCOL ORDERS        Extensive labs orders already placed by several other providers. To be drawn today.    He is going to see if he can get into pool exercise program.        COUNSELING:  Reviewed preventive health counseling, as reflected in patient instructions      BMI:   Estimated body mass index is 35.3 kg/m  as calculated from the following:    Height as of this encounter: 1.93 m (6' 4\").    Weight as of this encounter: 131.5 kg (290 lb).   Weight management plan: Discussed healthy diet and exercise " guidelines      He reports that he has never smoked. He quit smokeless tobacco use about 2 years ago.  His smokeless tobacco use included chew.         Appropriate preventive services were discussed with this patient, including applicable screening as appropriate for cardiovascular disease, diabetes, osteopenia/osteoporosis, and glaucoma.  As appropriate for age/gender, discussed screening for colorectal cancer, prostate cancer, breast cancer, and cervical cancer. Checklist reviewing preventive services available has been given to the patient.    Reviewed patients plan of care and provided an AVS. The Basic Care Plan (routine screening as documented in Health Maintenance) for Jailene meets the Care Plan requirement. This Care Plan has been established and reviewed with the Patient.    The 10-year ASCVD risk score (Chauncey BANDA, et al., 2019) is: 11.4%    Values used to calculate the score:      Age: 57 years      Sex: Male      Is Non- : No      Diabetic: No      Tobacco smoker: No      Systolic Blood Pressure: 133 mmHg      Is BP treated: Yes      HDL Cholesterol: 30 mg/dL      Total Cholesterol: 209 mg/dL      JASON Rice  [facility]

## 2022-12-08 NOTE — PATIENT INSTRUCTIONS
At [facility], we strive to deliver an exceptional experience to you, every time we see you. If you receive a survey, please complete it as we do value your feedback.  If you have MyChart, you can expect to receive results automatically within 24 hours of their completion.  Your provider will send a note interpreting your results as well.   If you do not have MyChart, you should receive your results in about a week by mail.    Your care team:                            Family Medicine Internal Medicine   MD Paul Coy MD Shantel Branch-Fleming, MD Srinivasa Vaka, MD Katya Belousova, JUANITA Fenton CNP, MD ([facility]) Pediatrics   Ruperto Resendez, MD Mayra Hollis MD Amelia Massimini APRN CNP   Phoebe Jones, APRN CNP MD Jayme Chan MD          Clinic hours: Monday - Thursday 7 am-6 pm; Fridays 7 am-5 pm.   Urgent care: Monday - Friday 10 am- 8 pm; Saturday and Sunday 9 am-5 pm.    Clinic: [phone]       [facility]: Monday - Thursday 8 am - 7 pm; Friday 8 am - 6 pm  [facility]: [phone]     Preventive Health Recommendations  Male Ages 50 - 64    Yearly exam:             See your health care provider every year in order to  o   Review health changes.   o   Discuss preventive care.    o   Review your medicines if your doctor has prescribed any.     Have a cholesterol test every 5 years, or more frequently if you are at risk for high cholesterol/heart disease.     Have a diabetes test (fasting glucose) every three years. If you are at risk for diabetes, you should have this test more often.     Have a colonoscopy at age 50, or have a yearly FIT test (stool test). These exams will check for colon cancer.      Talk with your health care provider about whether or not a prostate cancer screening test (PSA) is right for you.    You  should be tested each year for STDs (sexually transmitted diseases), if you re at risk.     Shots: Get a flu shot each year. Get a tetanus shot every 10 years.     Nutrition:    Eat at least 5 servings of fruits and vegetables daily.     Eat whole-grain bread, whole-wheat pasta and brown rice instead of white grains and rice.     Get adequate Calcium and Vitamin D.     Lifestyle    Exercise for at least 150 minutes a week (30 minutes a day, 5 days a week). This will help you control your weight and prevent disease.     Limit alcohol to one drink per day.     No smoking.     Wear sunscreen to prevent skin cancer.     See your dentist every six months for an exam and cleaning.     See your eye doctor every 1 to 2 years.

## 2022-12-09 LAB — DEPRECATED CALCIDIOL+CALCIFEROL SERPL-MC: 21 UG/L (ref 20–75)

## 2022-12-12 ENCOUNTER — TELEPHONE (OUTPATIENT)
Dept: FAMILY MEDICINE | Facility: CLINIC | Age: 57
End: 2022-12-12

## 2022-12-12 ENCOUNTER — MYC MEDICAL ADVICE (OUTPATIENT)
Dept: PALLIATIVE MEDICINE | Facility: CLINIC | Age: 57
End: 2022-12-12

## 2022-12-12 DIAGNOSIS — F11.90 CHRONIC, CONTINUOUS USE OF OPIOIDS: ICD-10-CM

## 2022-12-12 DIAGNOSIS — E78.5 DYSLIPIDEMIA: Primary | ICD-10-CM

## 2022-12-12 DIAGNOSIS — F11.20 UNCOMPLICATED OPIOID DEPENDENCE (H): ICD-10-CM

## 2022-12-12 DIAGNOSIS — M25.551 HIP PAIN, RIGHT: ICD-10-CM

## 2022-12-12 DIAGNOSIS — M05.79 RHEUMATOID ARTHRITIS INVOLVING MULTIPLE SITES WITH POSITIVE RHEUMATOID FACTOR (H): ICD-10-CM

## 2022-12-12 DIAGNOSIS — M25.50 MULTIPLE JOINT PAIN: ICD-10-CM

## 2022-12-12 DIAGNOSIS — M54.59 LUMBAR FACET JOINT PAIN: ICD-10-CM

## 2022-12-12 DIAGNOSIS — M51.369 DDD (DEGENERATIVE DISC DISEASE), LUMBAR: ICD-10-CM

## 2022-12-12 DIAGNOSIS — M45.7 ANKYLOSING SPONDYLITIS OF LUMBOSACRAL REGION (H): ICD-10-CM

## 2022-12-12 RX ORDER — ATORVASTATIN CALCIUM 20 MG/1
20 TABLET, FILM COATED ORAL DAILY
Qty: 90 TABLET | Refills: 1 | Status: SHIPPED | OUTPATIENT
Start: 2022-12-12 | End: 2023-05-30

## 2022-12-12 NOTE — TELEPHONE ENCOUNTER
Please process a refill of oxyCODONE (ROXICODONE) 5 MG tablet to     [id number] - [place], [place] - [address] [facility]  [address] [place] [address]  Phone: [phone] Fax: [phone]

## 2022-12-12 NOTE — TELEPHONE ENCOUNTER
Received call from patient requesting refill(s) of oxyCODONE (ROXICODONE) 5 MG tablet     Last dispensed from pharmacy on 11/23/22    Patient's last office/virtual visit by prescribing provider on 11/23/22  Next office/virtual appointment scheduled for : none    Last urine drug screen date 08/31/22  Current opioid agreement on file (completed within the last year) Yes Date of opioid agreement: 05/06/22    E-prescribe to pharmacy-[facility] #[id number] [place], [place] - [address] [place] [address] AT [facility]    Will route to nursing [place] for review and preparation of prescription(s).

## 2022-12-12 NOTE — TELEPHONE ENCOUNTER
The 10-year ASCVD risk score (Chauncey BANDA, et al., 2019) is: 14.3%    Values used to calculate the score:      Age: 57 years      Sex: Male      Is Non- : No      Diabetic: No      Tobacco smoker: No      Systolic Blood Pressure: 133 mmHg      Is BP treated: Yes      HDL Cholesterol: 25 mg/dL      Total Cholesterol: 224 mg/dL

## 2022-12-13 RX ORDER — OXYCODONE HYDROCHLORIDE 5 MG/1
5 TABLET ORAL EVERY 6 HOURS PRN
Qty: 42 TABLET | Refills: 0 | Status: SHIPPED | OUTPATIENT
Start: 2022-12-13 | End: 2023-05-02

## 2022-12-13 NOTE — TELEPHONE ENCOUNTER
Signed Prescriptions:                        Disp   Refills    oxyCODONE (ROXICODONE) 5 MG tablet         42 tab*0        Sig: Take 1 tablet (5 mg) by mouth every 6 hours as needed           for severe pain (7-10) Max of 3/day. Fill/begin           12/13/22. Short term script for acute pain flare.  Authorizing Provider: SUSANA PETTY    Reviewed [facility] December 13, 2022- no concerning fills.    Susana Petty APRN, RN CNP, FNP  [facility]  [facility] [place] [place]

## 2022-12-13 NOTE — TELEPHONE ENCOUNTER
Medication refill information reviewed.     Shaynat from patient:    Hello, well I should be getting my enbrel this Wednesday after a bunch of running through hoops and have been hurting for a bit. Could you please refill the oxycodone one more time and this should take care of it. Please send to [facility] in [place]. Thank you.     Due date for oxyCODONE (ROXICODONE) 5 MG tablet  is 12/13/22     Prescriptions prepped for review.     Will route to provider.

## 2022-12-14 NOTE — RESULT ENCOUNTER NOTE
Normal monitoring labs - normal liver, kidney tests and cell counts.     Raghu Parada MD  12/14/2022  2:47 PM

## 2022-12-22 ENCOUNTER — TELEPHONE (OUTPATIENT)
Dept: RHEUMATOLOGY | Facility: CLINIC | Age: 57
End: 2022-12-22

## 2022-12-22 NOTE — TELEPHONE ENCOUNTER
2023 Free Drug Renewal    -sent MD portion via email  -sent pt a [facility] msg asking if they've sent their portion

## 2022-12-27 ENCOUNTER — MYC MEDICAL ADVICE (OUTPATIENT)
Dept: PALLIATIVE MEDICINE | Facility: CLINIC | Age: 57
End: 2022-12-27

## 2022-12-27 DIAGNOSIS — M54.59 LUMBAR FACET JOINT PAIN: ICD-10-CM

## 2022-12-27 DIAGNOSIS — M51.369 DDD (DEGENERATIVE DISC DISEASE), LUMBAR: ICD-10-CM

## 2022-12-27 DIAGNOSIS — M45.7 ANKYLOSING SPONDYLITIS OF LUMBOSACRAL REGION (H): ICD-10-CM

## 2022-12-27 DIAGNOSIS — M05.79 RHEUMATOID ARTHRITIS INVOLVING MULTIPLE SITES WITH POSITIVE RHEUMATOID FACTOR (H): ICD-10-CM

## 2022-12-27 DIAGNOSIS — M25.551 HIP PAIN, RIGHT: ICD-10-CM

## 2022-12-27 DIAGNOSIS — F11.20 UNCOMPLICATED OPIOID DEPENDENCE (H): ICD-10-CM

## 2022-12-27 DIAGNOSIS — F11.90 CHRONIC, CONTINUOUS USE OF OPIOIDS: ICD-10-CM

## 2022-12-27 NOTE — TELEPHONE ENCOUNTER
Please process a refill of buprenorphine (SUBUTEX) 8 MG SUBL sublingual tablet to     [facility] #[id number] - [place], [place] - [address] [facility]  [address]   [place] [address]  Phone: [phone] Fax: [phone]

## 2022-12-28 NOTE — TELEPHONE ENCOUNTER
Medication refill information reviewed.     Due date for buprenorphine (SUBUTEX) 8 MG SUBL sublingual tablet  is 12/29/22     Prescriptions prepped for review.     Will route to provider.

## 2022-12-28 NOTE — TELEPHONE ENCOUNTER
Received call from patient requesting refill(s) of buprenorphine (SUBUTEX) 8 MG SUBL sublingual tablet     Last dispensed from pharmacy on 11.28.2022    Patient's last office/virtual visit by prescribing provider on 11.23.2022    Next office/virtual appointment scheduled for NS    UDS 08.31.2022  CSA 05.06.2022    [facility] to    [id number] - [place], [place] - [address] AT [facility]    Will route to nursing [place] for review and preparation of prescription(s).

## 2022-12-29 RX ORDER — BUPRENORPHINE 8 MG/1
4-8 TABLET SUBLINGUAL 4 TIMES DAILY
Qty: 60 TABLET | Refills: 0 | Status: SHIPPED | OUTPATIENT
Start: 2022-12-29 | End: 2023-01-26

## 2022-12-29 NOTE — TELEPHONE ENCOUNTER
Signed Prescriptions:                        Disp   Refills    buprenorphine (SUBUTEX) 8 MG SUBL sublingu*60 tab*0        Sig: Place 0.5-1 tablets (4-8 mg) under the tongue 4 times           daily Max 2 tabs per day. Fill/start 12/29/22  Authorizing Provider: SUSANA PETTY    Reviewed [place]  December 29, 2022- no concerning fills.  Please encourage patient to make an appt, I need to see him every 12 weeks and I am booking into February already. Thanks.   Susana Petty APRN, RN CNP, FNP  [facility]  [facility]

## 2022-12-29 NOTE — TELEPHONE ENCOUNTER
Called patient to notify them their prescription refills have been fulfilled.     Patient has an appointment scheduled early February.

## 2023-01-02 NOTE — TELEPHONE ENCOUNTER
nicotine polacrilex (NICORETTE) 2 MG gum     Last Written Prescription Date: 2/21/17  Last Fill Quantity: na, # refills: na  Last Office Visit with [facility], [facility] or [facility] prescribing provider: 3/1/17   Next 5 appointments (look out 90 days)     May 18, 2017  8:00 AM CDT   Return Visit with Padilla Keene LP   [facility] [place] ([facility] [place])    [address]  [place] [address]   [phone]            May 18, 2017  9:30 AM CDT   Return Visit with Renetta White, PT   [facility] [place] ([facility] [place])    [address]  [place] [address]   [phone]            Jun 06, 2017  3:00 PM CDT   Return Visit with JUANITA Ga CNP   [facility] [place] ([facility] [place])    [address]  [place] [address]   [phone]                   BP Readings from Last 3 Encounters:   04/26/17 141/80   04/03/17 141/86   03/31/17 146/88        Low urine output jessie     LAB

## 2023-01-03 ENCOUNTER — MYC MEDICAL ADVICE (OUTPATIENT)
Dept: RHEUMATOLOGY | Facility: CLINIC | Age: 58
End: 2023-01-03

## 2023-01-03 DIAGNOSIS — F32.A DEPRESSION, UNSPECIFIED DEPRESSION TYPE: ICD-10-CM

## 2023-01-04 RX ORDER — FOLIC ACID 1 MG/1
3 TABLET ORAL DAILY
Qty: 270 TABLET | Refills: 2 | Status: SHIPPED | OUTPATIENT
Start: 2023-01-04 | End: 2023-08-16

## 2023-01-04 NOTE — TELEPHONE ENCOUNTER
folic acid (FOLVITE) 1 MG tablet      Last Written Prescription Date:  9-1-22  Last Fill Quantity: 180,   # refills: 1  Last Office Visit : 11-15-22  Future Office visit:  5-16-22

## 2023-01-04 NOTE — PROGRESS NOTES
Video- Visit Details  Type of service:  video visit for medication management  Time of service:    Video Start Time: 10:10 AM        Video End Time: 10:50 AM   Reason for video visit:  Patient convenience   Originating Site (patient location):  [facility]- [place]   Location- Patient's home  Distant Site (provider location):  [facility]  Mode of Communication:  Video Conference via [employer]       [facility]  Psychiatry Clinic  MEDICAL PROGRESS NOTE     CARE TEAM:  PCP- Aiden Resendez    Psychotherapist- None       This person is a 57 year old who uses the name Jamison and pronouns he, him, his.      DIAGNOSIS     - Major depressive disorder, recurrent, moderate  - Generalized anxiety disorder, with panic  - PTSD     Medical Diagnoses:  - Chronic pain  - Rheumatoid arthritis  - Ankylosing spondylitis ([id number] positive)  - Hypertension     ASSESSMENT     Mr. Breen reports that his mood has been down, and his main concern is poor sleep. A sleep medicine referral was made at the last visit. Patient has not followed up on this yet, and the contact number was shared with him. He is still interested in re-starting therapy and agreed to reach out to [facility] to follow up on his referral.     Today prazosin was increased to 2 mg to target sleep and nightmares.     He previously noted shortness of breath and diaphoresis while doing minor activities and saw his FP on 12/8/2022. See EKG below.     Denies suicidal ideation, any intent, or plan. No reports of medication side effects. If his symptoms worsen, we'll consider titration from fluoxetine to an SNRI.     The patient understands the risks, benefits, adverse effects, and alternatives. Agrees to treatment with the capacity to do so. No medical contraindications to treatment. Agrees to call the clinic for any problems. The patient understands to call 911 or come to the nearest ED if life-threatening or urgent symptoms  "present.    [place] review was not needed today.     PLAN                                                                                                                1) Medications:   - Continue fluoxetine 60 mg daily   - Continue quetiapine 50 mg at bedtime  - Continue quetiapine 25-50 mg as needed for anxiety and panic  - Continue liothyronine 25 mcg daily  - Increase prazosin from 1 mg to 2 mg at bedtime for nightmares    Other:  - Suboxone  - Methotrexate     2) Therapy-  Patient is interested; psychotherapy; he agreed to follow up the previous referral order    3) Next Due   Labs - AP labs last done on 12/2022  EKG - as needed  Rating scales- AIMS     4) Referral -   - Patient will follow up sleep medicine referral and psychotherapy referral     5) Other - Nurse partner check-in two weeks     6) RTC: 4-6 weeks     7) Crisis Numbers:   Provided routinely in AVS   After hours:  [phone]     PERTINENT BACKGROUND                                                    [most recent eval 07/13/22]     Originally, diagnosed with depression and anxiety in 2015 but first experienced symptoms of depression in middle school. He most recently had a significant worsening of symptoms in 2017 after he was diagnosed with rheumatoid arthritis, ankylosing spondylitis ended up with a knee injury and was no longer able to work or run which he used as his primary coping mechanism.    Pertinent Items Include: suicidal ideation, SIB [cutting], multiple psychotropic trials , severe med reaction, psych hosp (<3), SUBSTANCE USE: alcohol, substance use treatment  and Major Medical Problems (Rheumatoid Arthritis and Ankylosing Spondylitis)     SUBJECTIVE     - Mr. Breen reports that he feels more depressed (see below)  - Having nightmares; feels they are trauma-related  - Holidays have been hard   - Mom is at a nursing home; she won't talk with him  - Mom's \"hate\" toward him has been very hurtful  - Plans to use a lightbox and continue " to monitor his depressive symptoms  - Sleep remains poor.   - Interested in psychotherapy; will call   - Meets with his  every other week.   - BP has been normal.  - Denies suicidal ideation, intent, or plan today; denies any safety concerns.    Recent Psych Symptoms:   Depression:  depressed mood, anhedonia, low energy, appetite changes, poor concentration /memory, excessive guilt, feeling hopeless, feeling trapped and overwhelmed  Elevated:  none  Psychosis:  none  Anxiety:  excessive worry and nervous/overwhelmed, no panic attacks since the last visit  Trauma Related:  nightmares, flashbacks, negative beliefs / emotions and hypervigilance, nightmares about child trauma, lately he does not remember  Sleep: Yes, 4-6 hrs average (see above).    Adverse Effects:  denies  Pertinent Negative Symptoms: No suicidal ideation, violent ideation, aggression, psychosis, hallucinations, delusions, otto and hypomania    Recent Substance Use:     Alcohol- none since , used to attend AA meetings prior to COVID, no plans to start  Tobacco- denies  Caffeine- no, Mr.   Cannabis- denies     Opioids- as prescribed           Narcan Kit- prescribed   Other illicit drugs- none     FAMILY and SOCIAL HISTORY                                 pt reported     Family Hx:  Mom - depression (since  when sister )     Social Hx:  Financial/ Work- planning to attend [employer] for a degree in psychology; unable to work due to diagnosis of rheumatoid arthritis and ankylosing spondylitis, on disability   Partner/ -  in   Children- 26 and 28 year old  Living situation- [place], house with wife and daughter 27 yo at home, son is in [place], three dogs    Social/ Spiritual Support- Talks to  every two weeks, not many friends, family is supportive, likes walking and hiking     PAST PSYCHIATRIC HISTORY     SIB- Hx of cutting/carving of skin   Suicide Attempt [#, most recent]- No   Suicidal Ideation Hx- No    Violence/Aggression Hx- No, not now  Psychosis Hx- No   Eating Disorder Hx- No   Psych Hosp [#, most recent]- Yes, 3-4 times, last one a few months, 10 days longest  Commitment- No   TMS/ECT- No   Outpatient Programs - Yes, CD treatment at [facility], in patient and outpatient program     PAST MED TRIALS       Medication  Dose   (mg) Effect  Dates of Use   Fluoxetine 40 Felt like was initially helpful 2016 - 2017   Sertraline 100 Effective initially, eventually self discontinued as not helpful 2018 - 7/2020   Duloxetine 30 BID Used to treat nerve pain; felt weird on it 2017   Bupropion  BID ineffective 2017 - 2019   Nortriptyline 50 For pain 2017 -  3/2019   Mirtazapine   Dissociated for entire first week after taking 2012   Trazodone 50   2013             Hydroxyzine 25 QID prn For anxiety and panic attacks; not effective for severe anxiety 2015 - 2017   Buspirone 15 TID   2016 - 2017   Gabapentin 100 TID   2013 - 2016   Alprazolam 0.5 TID For anxiety 6681-1281   Diazepam 2 BID For anxiety 2016   Lorazepam 1 TID For anxiety 2016   Clonazepam 1 For anxiety 2016 - present       PSYCH CRITICAL SUMMARY POINTS         [From Dr. Sage's Notes on 6/8/2022:  9/20 - started escitalopram  11/20 - increased escitalopram 20mg daily  2/21 - stopped hydroxyzine; started trazodone 50mg at bedtime and 25mg daily as needed for anxiety  3/21 - 4/22 - hospitalized started on Abilify, titrated to 5mg daily; escitalopram switched to fluoxetine 40mg daily; started prazosin 1mg at bedtime. Pain management switched oxycodone to Suboxone and started medical marijuana.  5/21  - patient still taking hydroxyzine, discontinued today.  6/21  - patient hospitalized (6/15/21) discontinued Abilify and trazodone, started on quetiapine & titrated to 100mg at bedtime and 25mg as needed for sleep.   8/21 - increase quetiapine 150mg   11/21  - discontinued quetiapine 150mg (patient ran out of medications and did not take for several  weeks).  12/21 - patient restarted quetiapine 50mg at bedtime   3/22 - increased fluoxetine 60mg]    7/14/22  -changed prazosin from PRN to scheduled at bedtime  11/3/202: no medication changes. Elevated BP history discussed. Patient agreed to make a PCP appointment.   12/1/2022: No medication changes.   1/5/2023:  Increased prazosin to 2 mg at bedtime     MEDICAL HISTORY and ALLERGY     ALLERGIES: Mirtazapine, Hydrocodone, Ms contin [morphine], and Remeron soltab    Patient Active Problem List   Diagnosis     CARDIOVASCULAR SCREENING; LDL GOAL LESS THAN 160     Obesity, Class I, BMI 30-34.9     Tear meniscus knee, right, initial encounter     Rheumatoid arthritis involving multiple sites, unspecified rheumatoid factor presence     Chronic pain     Right-sided thoracic back pain, unspecified chronicity     Generalized anxiety disorder     Panic attack     Ankylosing spondylitis (H)     Moderate major depression (H)     Immunocompromised (H)     Essential hypertension with goal blood pressure less than 140/90     Suicidal ideation     Insomnia due to other mental disorder     Major depressive disorder, recurrent episode, severe with mixed features (H)     Chronic back pain greater than 3 months duration     Contact dermatitis and eczema     Dermatitis     Drug dependence (H)     Encounter for screening colonoscopy     Esophageal reflux     Hematuria     Hyperlipidemia     Lumbar radiculopathy     Plantar fascial fibromatosis     Sprain of sacroiliac region     Overweight     Nonintractable migraine     Narcotic abuse, continuous (H)     Morbid obesity (H)        MEDICAL REVIEW OF SYSTEMS     Contraception- none    A comprehensive review of systems was performed and is negative other than noted in the HPI.      MEDICATIONS     Current Outpatient Medications   Medication Sig Dispense Refill     atorvastatin (LIPITOR) 20 MG tablet Take 1 tablet (20 mg) by mouth daily 90 tablet 1     buprenorphine (SUBUTEX) 8 MG SUBL  sublingual tablet Place 0.5-1 tablets (4-8 mg) under the tongue 4 times daily Max 2 tabs per day. Fill/start 12/29/22 60 tablet 0     etanercept (ENBREL SURECLICK) 50 MG/ML autoinjector Inject 50 mg Subcutaneous once a week 1 mL 3     FLUoxetine (PROZAC) 20 MG capsule Take 3 capsules (60 mg) by mouth daily 90 capsule 1     folic acid (FOLVITE) 1 MG tablet Take 3 tablets (3 mg) by mouth daily 270 tablet 2     liothyronine (CYTOMEL) 25 MCG tablet Take 1 tablet (25 mcg) by mouth daily 30 tablet 1     medical cannabis (Patient's own supply) See Admin Instructions (The purpose of this order is to document that the patient reports taking medical cannabis.  This is not a prescription, and is not used to certify that the patient has a qualifying medical condition.)     Pills PRN   Lozenges PRN       methotrexate 2.5 MG tablet Take 5 tab once a week and following week increase to 6 tab once a week dose. 72 tablet 0     oxyCODONE (ROXICODONE) 5 MG tablet Take 1 tablet (5 mg) by mouth every 6 hours as needed for severe pain (7-10) Max of 3/day. Fill/begin 12/13/22. Short term script for acute pain flare. 42 tablet 0     prazosin (MINIPRESS) 2 MG capsule Take 1 capsule (2 mg) by mouth At Bedtime 30 capsule 1     QUEtiapine (SEROQUEL) 50 MG tablet Take 1 tablet (50 mg) by mouth At Bedtime May take additional 25-50mg (1/2 - 1 tablet) daily prn for anxiety/panic attacks 45 tablet 1     vitamin D2 (ERGOCALCIFEROL) 45753 units (1250 mcg) capsule TAKE 1 CAPSULE BY MOUTH EVERY MONDAY AND THURSDAY        VITALS   There were no vitals taken for this visit.     Pulse Readings from Last 5 Encounters:   12/08/22 89   11/23/22 72   09/22/22 66   08/31/22 65   05/02/22 72     Wt Readings from Last 5 Encounters:   12/08/22 131.5 kg (290 lb)   05/02/22 122.7 kg (270 lb 8 oz)   03/16/22 125.3 kg (276 lb 3.2 oz)   02/16/22 122.5 kg (270 lb)   12/30/21 123 kg (271 lb 2 oz)     BP Readings from Last 5 Encounters:   12/08/22 133/85   11/23/22  "138/85   09/22/22 (!) 176/88   08/31/22 (!) 154/95   05/02/22 (!) 163/90        MENTAL STATUS EXAM     Alertness: alert , oriented and sleepy  Appearance: casually groomed, limited obs by video visit  Behavior/Demeanor: cooperative, pleasant and calm, with poor eye contact   Speech: normal and regular rate and rhythm  Language: intact and no problems  Psychomotor: normal or unremarkable and based on video visit, no abnormal moveemnts or tics were observed  Mood: \"mostly same, and fluctuates\"   Affect: guarded; congruent to: mood- yes, content- yes  Thought Process/Associations: unremarkable  Thought Content:  Reports none;  Denies suicidal & violent ideation and delusions, denies any plan or intent  Perception:  Reports none;  Denies auditory hallucinations and visual hallucinations  Insight: good  Judgment: fair  Cognition: does  appear grossly intact; formal cognitive testing was not done  Gait and Station: N/A (telehealth)     LABS and DATA     PHQ 11/3/2022 12/8/2022 1/5/2023   PHQ-9 Total Score 14 14 18   Q9: Thoughts of better off dead/self-harm past 2 weeks Not at all Not at all Not at all   F/U: Thoughts of suicide or self-harm - - -   F/U: Self harm-plan - - -   F/U: Self-harm action - - -   F/U: Safety concerns - - -       Recent Labs   Lab Test 12/08/22  1425 08/31/22  1405   CR 0.98 0.94   GFRESTIMATED >90 >90     Recent Labs   Lab Test 12/08/22  1425 08/31/22  1405 10/11/21  1123   AST 32 35 37   ALT 49 46 47   ALKPHOS 112  --  102       Recent Labs   Lab Test 12/08/22  1427 12/08/22  1425 10/11/21  1123   GLC  --  96 97   A1C 5.5  --  5.2     Recent Labs   Lab Test 12/08/22  1425 10/11/21  1123   CHOL 224* 209*   TRIG 230* 225*   * 134*   HDL 25* 30*     Recent Labs   Lab Test 12/08/22  1425 08/31/22  1405 06/16/21  0741 03/21/21  0808 03/17/21  0748   WBC 7.7 6.0   < > 6.8 8.3   ANEU  --   --   --  3.7 4.4   HGB 15.6 15.1   < > 15.6 15.0    213   < > 246 211    < > = values in this " interval not displayed.     TSH   Date Value Ref Range Status   12/08/2022 1.19 0.40 - 4.00 mU/L Final   05/02/2022 1.46 0.40 - 4.00 mU/L Final   10/11/2021 1.37 0.40 - 4.00 mU/L Final   06/16/2021 0.65 0.40 - 4.00 mU/L Final   03/21/2021 2.39 0.40 - 4.00 mU/L Final   03/17/2021 2.01 0.40 - 4.00 mU/L Final   09/30/2016 1.62 0.40 - 4.00 mU/L Final     ECG 6/15/2021 QTc = 413 ms    EKG on 12/8/2022 QTc 403 ms  Sinus  Rhythm   -Incomplete right bundle branch block.    -Left atrial enlargement.      PSYCHOTROPIC DRUG INTERACTIONS                                                       PSYCHCLINICDDI     Increased risk of QTc prolongation: fluoxetine, quetiapine, Suboxone  Increased risk of serotonin syndrome: fluoxetine, Suboxone  Increased risk of CNS/respiratory depression: Suboxone, quetiapine    Prazosin and buprenorphine may have additive effects in lowering your blood pressure.    MANAGEMENT:  Monitoring for adverse effects and patient is aware of risks     RISK STATEMENT for SAFETY     Mr. Breen did not appear to be an imminent safety risk to self or others.     TREATMENT RISK STATEMENT: The risks, benefits, alternatives and potential adverse effects have been discussed and are understood by the pt. The pt understands the risks of using street drugs or alcohol. There are no medical contraindications, the pt agrees to treatment with the ability to do so. The pt knows to call the clinic for any problems or to access emergency care if needed.  Medical and substance use concerns are documented above.  Psychotropic drug interaction check was done, including changes made today.     PROVIDER: Katie Corley MD, PhD    Patient staffed in clinic with Dr. Hunter who will sign the note.  Supervisor is Dr. Hunter.

## 2023-01-05 ENCOUNTER — VIRTUAL VISIT (OUTPATIENT)
Dept: PSYCHIATRY | Facility: CLINIC | Age: 58
End: 2023-01-05
Attending: PSYCHIATRY & NEUROLOGY
Payer: COMMERCIAL

## 2023-01-05 DIAGNOSIS — F33.1 MAJOR DEPRESSIVE DISORDER, RECURRENT EPISODE, MODERATE (H): Primary | ICD-10-CM

## 2023-01-05 DIAGNOSIS — F43.10 PTSD (POST-TRAUMATIC STRESS DISORDER): ICD-10-CM

## 2023-01-05 DIAGNOSIS — F41.1 GENERALIZED ANXIETY DISORDER WITH PANIC ATTACKS: ICD-10-CM

## 2023-01-05 DIAGNOSIS — F41.0 GENERALIZED ANXIETY DISORDER WITH PANIC ATTACKS: ICD-10-CM

## 2023-01-05 PROCEDURE — 99214 OFFICE O/P EST MOD 30 MIN: CPT | Mod: GC | Performed by: STUDENT IN AN ORGANIZED HEALTH CARE EDUCATION/TRAINING PROGRAM

## 2023-01-05 RX ORDER — PRAZOSIN HYDROCHLORIDE 2 MG/1
2 CAPSULE ORAL AT BEDTIME
Qty: 30 CAPSULE | Refills: 1 | Status: SHIPPED | OUTPATIENT
Start: 2023-01-05 | End: 2023-02-10

## 2023-01-05 RX ORDER — LIOTHYRONINE SODIUM 25 UG/1
25 TABLET ORAL DAILY
Qty: 30 TABLET | Refills: 1 | Status: SHIPPED | OUTPATIENT
Start: 2023-01-05 | End: 2023-02-10

## 2023-01-05 RX ORDER — QUETIAPINE FUMARATE 50 MG/1
50 TABLET, FILM COATED ORAL AT BEDTIME
Qty: 45 TABLET | Refills: 1 | Status: SHIPPED | OUTPATIENT
Start: 2023-01-05 | End: 2023-02-09

## 2023-01-05 ASSESSMENT — ANXIETY QUESTIONNAIRES
7. FEELING AFRAID AS IF SOMETHING AWFUL MIGHT HAPPEN: NOT AT ALL
8. IF YOU CHECKED OFF ANY PROBLEMS, HOW DIFFICULT HAVE THESE MADE IT FOR YOU TO DO YOUR WORK, TAKE CARE OF THINGS AT HOME, OR GET ALONG WITH OTHER PEOPLE?: SOMEWHAT DIFFICULT
6. BECOMING EASILY ANNOYED OR IRRITABLE: SEVERAL DAYS
GAD7 TOTAL SCORE: 8
4. TROUBLE RELAXING: NEARLY EVERY DAY
GAD7 TOTAL SCORE: 8
GAD7 TOTAL SCORE: 8
3. WORRYING TOO MUCH ABOUT DIFFERENT THINGS: SEVERAL DAYS
IF YOU CHECKED OFF ANY PROBLEMS ON THIS QUESTIONNAIRE, HOW DIFFICULT HAVE THESE PROBLEMS MADE IT FOR YOU TO DO YOUR WORK, TAKE CARE OF THINGS AT HOME, OR GET ALONG WITH OTHER PEOPLE: SOMEWHAT DIFFICULT
1. FEELING NERVOUS, ANXIOUS, OR ON EDGE: SEVERAL DAYS
7. FEELING AFRAID AS IF SOMETHING AWFUL MIGHT HAPPEN: NOT AT ALL
5. BEING SO RESTLESS THAT IT IS HARD TO SIT STILL: SEVERAL DAYS
2. NOT BEING ABLE TO STOP OR CONTROL WORRYING: SEVERAL DAYS

## 2023-01-05 ASSESSMENT — PATIENT HEALTH QUESTIONNAIRE - PHQ9
SUM OF ALL RESPONSES TO PHQ QUESTIONS 1-9: 18
SUM OF ALL RESPONSES TO PHQ QUESTIONS 1-9: 18
10. IF YOU CHECKED OFF ANY PROBLEMS, HOW DIFFICULT HAVE THESE PROBLEMS MADE IT FOR YOU TO DO YOUR WORK, TAKE CARE OF THINGS AT HOME, OR GET ALONG WITH OTHER PEOPLE: SOMEWHAT DIFFICULT

## 2023-01-05 NOTE — PROGRESS NOTES
Jailene Breen is a 57 year old who is being evaluated via a billable video visit.      Pt will join video visit via: [employer]  If there are problems joining the visit, send backup video invite via: Send to preferred e-mail: [email or web address]    Reason for telehealth visit: Patient has requested telehealth visit    Originating location (patient location): Patient's home    Will anyone else be joining the visit? No      Answers for HPI/ROS submitted by the patient on 1/5/2023  If you checked off any problems, how difficult have these problems made it for you to do your work, take care of things at home, or get along with other people?: Somewhat difficult  PHQ9 TOTAL SCORE: 18  JEFF 7 TOTAL SCORE: 8

## 2023-01-05 NOTE — PATIENT INSTRUCTIONS
**For crisis resources, please see the information at the end of this document**   Patient Education    Thank you for coming to the [facility] [place] [facility].     Lab Testing:  If you had lab testing today and your results are reassuring or normal they will be mailed to you or sent through [employer] within 7 days. If the lab tests need quick action we will call you with the results. The phone number we will call with results is # [phone]. If this is not the best number please call our clinic and change the number.     Medication Refills:  If you need any refills please call your pharmacy and they will contact us. Our fax number for refills is [phone].   Three business days of notice are needed for general medication refill requests.   Five business days of notice are needed for controlled substance refill requests.   If you need to change to a different pharmacy, please contact the new pharmacy directly. The new pharmacy will help you get your medications transferred.     Contact Us:  Please call [phone] during business hours (8-5:00 M-F).   If you have medication related questions after clinic hours, or on the weekend, please call [phone].     Financial Assistance [phone]   Medical Records [phone]       MENTAL HEALTH CRISIS RESOURCES:  For a emergency help, please call 911 or go to the nearest Emergency Department.     Emergency Walk-In Options:   EmPATH Unit @ [facility] ([place]): [phone] - Specialized mental health emergency area designed to be calming  [facility] ([place]): [phone]  [facility] ([place]): [phone]  [facility]): [phone]    [facility] Crisis Information:   [place]: [phone]  [place]: [phone]  [place] ([place]) - Adult: [phone]     Child: [phone]  [place] - Adult: [phone]     Child: [phone]  [place]:  [phone]  List of all [facility] resources:   https://[place].gov/dhs/people-we-serve/adults/health-care/mental-health/resources/crisis-contacts.jsp    National Crisis Information:   Crisis Text Line: Text  [place]  to 616146  Suicide & Crisis Lifeline: 988  National Suicide Prevention Lifeline: 1-703-819-TALK (1-319.790.1926)       For online chat options, visit https://suicidepreventionlifeline.org/chat/  Poison Control Center: 8-027-242-5214  Trans Lifeline: 3-853-042-1705 - Hotline for transgender people of all ages  The Tu Project: 2-640-473-5596 - Hotline for LGBT youth     For Non-Emergency Support:   Fast Tracker: Mental Health & Substance Use Disorder Resources -   https://www.[employer].org/

## 2023-01-11 NOTE — TELEPHONE ENCOUNTER
Provider portion completed and signed. Faxed to [employer] at [phone]. Confirmed by Right[employer]ax. Copy sent to scanning.     Ashlyn Dunbar, BSN, RN, PHN  Medical Specialty Care Coordinator  [facility]

## 2023-01-24 ENCOUNTER — MYC MEDICAL ADVICE (OUTPATIENT)
Dept: PALLIATIVE MEDICINE | Facility: CLINIC | Age: 58
End: 2023-01-24
Payer: COMMERCIAL

## 2023-01-24 DIAGNOSIS — M05.79 RHEUMATOID ARTHRITIS INVOLVING MULTIPLE SITES WITH POSITIVE RHEUMATOID FACTOR (H): ICD-10-CM

## 2023-01-24 DIAGNOSIS — M54.59 LUMBAR FACET JOINT PAIN: ICD-10-CM

## 2023-01-24 DIAGNOSIS — M45.7 ANKYLOSING SPONDYLITIS OF LUMBOSACRAL REGION (H): ICD-10-CM

## 2023-01-24 DIAGNOSIS — M51.369 DDD (DEGENERATIVE DISC DISEASE), LUMBAR: ICD-10-CM

## 2023-01-24 DIAGNOSIS — F11.20 UNCOMPLICATED OPIOID DEPENDENCE (H): ICD-10-CM

## 2023-01-24 DIAGNOSIS — F11.90 CHRONIC, CONTINUOUS USE OF OPIOIDS: ICD-10-CM

## 2023-01-24 DIAGNOSIS — M25.551 HIP PAIN, RIGHT: ICD-10-CM

## 2023-01-24 DIAGNOSIS — M25.50 MULTIPLE JOINT PAIN: ICD-10-CM

## 2023-01-24 NOTE — TELEPHONE ENCOUNTER
Received call from patient requesting refill(s) of buprenorphine (SUBUTEX) 8 MG SUBL sublingual tablet    Last dispensed from pharmacy on 12/29/22    Patient's last office/virtual visit by prescribing provider on 11/23/22  Next office/virtual appointment scheduled for 02/06/23    Last urine drug screen date 08/31/22  Current opioid agreement on file (completed within the last year) Yes Date of opioid agreement: 05/06/22    E-prescribe to pharmacy-[id number] - [place], [place] - [address] [place] [address] AT [facility]    Will route to nursing [place] for review and preparation of prescription(s).

## 2023-01-24 NOTE — TELEPHONE ENCOUNTER
Please process a refill of buprenorphine (SUBUTEX) 8 MG SUBL sublingual tablet to    [id number] - [place], [place] - [address] [facility]  [address]   [place] [address]  Phone: [phone] Fax: [phone]

## 2023-01-26 RX ORDER — BUPRENORPHINE 8 MG/1
4-8 TABLET SUBLINGUAL 4 TIMES DAILY
Qty: 60 TABLET | Refills: 0 | Status: SHIPPED | OUTPATIENT
Start: 2023-01-26 | End: 2023-03-01

## 2023-01-26 NOTE — TELEPHONE ENCOUNTER
Medication refill information reviewed.     Due date for  buprenorphine (SUBUTEX) 8 MG SUBL sublingual tablet is 01/28/24     Prescriptions prepped for review.     Will route to provider.

## 2023-01-26 NOTE — TELEPHONE ENCOUNTER
Notified pt of prescription was signed and sent to pharmacy.      buprenorphine (SUBUTEX) 8 MG SUBL sublingu*60 tab*0        Sig: Place 0.5-1 tablets (4-8 mg) under the tongue 4 times           daily Max 2 tabs per day. Fill 01/26/23 to start           01/28/23

## 2023-01-26 NOTE — TELEPHONE ENCOUNTER
Signed Prescriptions:                        Disp   Refills    buprenorphine (SUBUTEX) 8 MG SUBL sublingu*60 tab*0        Sig: Place 0.5-1 tablets (4-8 mg) under the tongue 4 times           daily Max 2 tabs per day. Fill 01/26/23 to start           01/28/23  Authorizing Provider: SUSANA PETTY        Reviewed [place]  January 26, 2023- no concerning fills.    Susana Petty APRN, RN CNP, FNP  [facility]  [facility]

## 2023-02-06 NOTE — PROGRESS NOTES
Video- Visit Details  Type of service:  video visit for medication management  Time of service:    Video Start Time:9:30 am      Video End Time: 10:10 AM  Reason for video visit:  Patient convenience   Originating Site (patient location):  [facility]- [place]   Location- Patient's home  Distant Site (provider location):  [facility]  Mode of Communication:  Video Conference via ThingWorx       [facility]  Psychiatry Clinic  MEDICAL PROGRESS NOTE     CARE TEAM:  PCP- Aiden Resendez    Psychotherapist- None       This person is a 57 year old who uses the name Jamison and pronouns he, him, his.      DIAGNOSIS     - Major depressive disorder, recurrent, moderate  - Generalized anxiety disorder, with panic  - PTSD     Medical Diagnoses:  - Chronic pain  - Rheumatoid arthritis  - Ankylosing spondylitis ([id number] positive)  - Hypertension     ASSESSMENT     Mr. Breen reports that his low mood and depressive symptoms (see below) continue. Shared his recent thoughts of wishing to be dead and not wanting to be alive (see subjective). Noted the stressor as his daughter's move at the end of this month and grief related to that. States he would not act on those thoughts. We discussed increasing fluoxetine to target depression, and he agreed,     Patient states that he benefited from increasing prazosin to 2 mg to target sleep and nightmares. A sleep medicine referral was made previously; the contact number was shared with him again at this visit to follow up on this referral. He continues to be interested in restarting therapy and will reach out to [facility] to follow up.     Had a PCP visit last fall, received preventive counseling, and patient is aware of the weight management referral. Monitoring weight changes closely.     Denies suicidal ideation, any intent, or plan. No reports of medication side effects.     If his symptoms continues, we'll consider titration from fluoxetine  to an SNRI.     The patient understands the risks, benefits, adverse effects, and alternatives. Agrees to treatment with the capacity to do so. No medical contraindications to treatment. Agrees to call the clinic for any problems. The patient understands to call 911 or come to the nearest ED if life-threatening or urgent symptoms present.    [place]MP review was not needed today.     PLAN                                                                                                                1) Medications:   - Increase fluoxetine from 60 mg daily to 80 mg daily  - Continue quetiapine 50 mg at bedtime  - Continue quetiapine 25-50 mg as needed for anxiety and panic  - Continue liothyronine 25 mcg daily  - Continue prazosin 2 mg at bedtime    Other:  - Suboxone  - Methotrexate     2) Therapy- Recommended   Patient is interested; psychotherapy; he agreed to follow up the previous referral order    3) Next Due   Labs - AP labs last done on 12/2022  EKG - last EKG 12/8/2022.  Rating scales- AIMS     4) Referral -   - Patient will follow up sleep medicine and psychotherapy referrals     5) Other - Nurse partner check-in two weeks     6) RTC: 4-6 weeks     7) Crisis Numbers:   Provided routinely in AVS   After hours:  [phone]     PERTINENT BACKGROUND                                                    [most recent eval 07/13/22]     Originally, diagnosed with depression and anxiety in 2015 but first experienced symptoms of depression in middle school. He most recently had a significant worsening of symptoms in 2017 after he was diagnosed with rheumatoid arthritis, ankylosing spondylitis ended up with a knee injury and was no longer able to work or run which he used as his primary coping mechanism.    Pertinent Items Include: suicidal ideation, SIB [cutting], multiple psychotropic trials , severe med reaction, psych hosp (<3), SUBSTANCE USE: alcohol, substance use treatment  and Major Medical Problems (Rheumatoid  "Arthritis and Ankylosing Spondylitis)     SUBJECTIVE     - Mr. Breen reports that he feels more depressed (see below)  - He has frequent thoughts of wishing to be dead, thoughts of \" it is OK if I don't wake up,\" \"I am worthless, my life is over.\"  - These thoughts hit me once in a while\". Patient states that he won't act on these thoughts;  \" I won't do anything.\"  - Denies access to guns   - Increase in prazosin helped with going to sleep, but he wakes up after a couple of hours sometimes and ruminates  - Daughter is moving out end of this month; happy for her but sad and feeling grief   - Still interested in psychotherapy; will call the counseling   - Denies suicidal ideation, intent, or plan today; denies any safety concerns.    Recent Psych Symptoms:   Depression:  depressed mood, anhedonia, low energy, appetite changes, poor concentration /memory, excessive guilt, feeling hopeless, feeling trapped and overwhelmed  Elevated:  none  Psychosis:  none  Anxiety:  excessive worry and nervous/overwhelmed, no panic attacks since the last visit  Trauma Related:  nightmares, flashbacks, negative beliefs / emotions and hypervigilance, nightmares about child trauma, lately he does not remember  Sleep: Yes, 4-6 hrs average (see above).    Adverse Effects:  denies  Pertinent Negative Symptoms: No suicidal ideation, violent ideation, aggression, psychosis, hallucinations, delusions, otto and hypomania    Recent Substance Use:     Alcohol- none since , used to attend AA meetings prior to COVID, no plans to start  Tobacco- denies  Caffeine- no,    Cannabis- denies     Opioids- as prescribed           Narcan Kit- prescribed   Other illicit drugs- none     FAMILY and SOCIAL HISTORY                                 pt reported     Family Hx:  Mom - depression (since  when sister )     Social Hx:  Financial/ Work- planning to attend [employer] for a degree in psychology; unable to work due to diagnosis of " rheumatoid arthritis and ankylosing spondylitis, on disability   Partner/ -  in 1992  Children- 26 and 28 year old  Living situation- [place], house with wife and daughter 27 yo at home, son is in [place], three dogs    Social/ Spiritual Support- Talks to  every two weeks, not many friends, family is supportive, likes walking and hiking     PAST PSYCHIATRIC HISTORY     SIB- Hx of cutting/carving of skin   Suicide Attempt [#, most recent]- No   Suicidal Ideation Hx- No   Violence/Aggression Hx- No, not now  Psychosis Hx- No   Eating Disorder Hx- No   Psych Hosp [#, most recent]- Yes, 3-4 times, 10 days longest  Commitment- No   TMS/ECT- No   Outpatient Programs - Yes, CD treatment at [facility], in patient and outpatient program     PAST MED TRIALS       Medication  Dose   (mg) Effect  Dates of Use   Fluoxetine 40-80 Felt like was initially helpful 2016 - 2017 2022-2023   Sertraline 100 Effective initially, eventually self discontinued as not helpful 2018 - 7/2020   Duloxetine 30 BID Used to treat nerve pain; felt weird on it 2017   Bupropion  BID ineffective 2017 - 2019   Nortriptyline 50 For pain 2017 -  3/2019   Mirtazapine   Dissociated for entire first week after taking 2012   Trazodone 50   2013             Hydroxyzine 25 QID prn For anxiety and panic attacks; not effective for severe anxiety 2015 - 2017   Buspirone 15 TID   2016 - 2017   Gabapentin 100 TID   2013 - 2016   Alprazolam 0.5 TID For anxiety 5372-0410   Diazepam 2 BID For anxiety 2016   Lorazepam 1 TID For anxiety 2016   Quetiapine  25-50  [id number]   Clonazepam 1 For anxiety 2016        PSYCH CRITICAL SUMMARY POINTS         [From Dr. Sage's Notes on 6/8/2022:  9/20 - started escitalopram  11/20 - increased escitalopram 20mg daily  2/21 - stopped hydroxyzine; started trazodone 50mg at bedtime and 25mg daily as needed for anxiety  3/21 - 4/22 - hospitalized started on Abilify, titrated to 5mg daily; escitalopram switched to  fluoxetine 40mg daily; started prazosin 1mg at bedtime. Pain management switched oxycodone to Suboxone and started medical marijuana.  5/21  - patient still taking hydroxyzine, discontinued today.  6/21  - patient hospitalized (6/15/21) discontinued Abilify and trazodone, started on quetiapine & titrated to 100mg at bedtime and 25mg as needed for sleep.   8/21 - increase quetiapine 150mg   11/21  - discontinued quetiapine 150mg (patient ran out of medications and did not take for several weeks).  12/21 - patient restarted quetiapine 50mg at bedtime   3/22 - increased fluoxetine 60mg]    7/14/22  -changed prazosin from PRN to scheduled at bedtime  11/3/202: no medication changes. Elevated BP history discussed. Patient agreed to make a PCP appointment.   12/1/2022: No medication changes.   1/5/2023:  Increased prazosin to 2 mg at bedtime     MEDICAL HISTORY and ALLERGY     ALLERGIES: Mirtazapine, Hydrocodone, Ms contin [morphine], and Remeron soltab    Patient Active Problem List   Diagnosis     CARDIOVASCULAR SCREENING; LDL GOAL LESS THAN 160     Obesity, Class I, BMI 30-34.9     Tear meniscus knee, right, initial encounter     Rheumatoid arthritis involving multiple sites, unspecified rheumatoid factor presence     Chronic pain     Right-sided thoracic back pain, unspecified chronicity     Generalized anxiety disorder     Panic attack     Ankylosing spondylitis (H)     Moderate major depression (H)     Immunocompromised (H)     Essential hypertension with goal blood pressure less than 140/90     Suicidal ideation     Insomnia due to other mental disorder     Major depressive disorder, recurrent episode, severe with mixed features (H)     Chronic back pain greater than 3 months duration     Contact dermatitis and eczema     Dermatitis     Drug dependence (H)     Encounter for screening colonoscopy     Esophageal reflux     Hematuria     Hyperlipidemia     Lumbar radiculopathy     Plantar fascial fibromatosis      Sprain of sacroiliac region     Overweight     Nonintractable migraine     Narcotic abuse, continuous (H)     Morbid obesity (H)        MEDICAL REVIEW OF SYSTEMS     Contraception- none    A comprehensive review of systems was performed and is negative other than noted in the HPI.      MEDICATIONS     Current Outpatient Medications   Medication Sig Dispense Refill     FLUoxetine (PROZAC) 40 MG capsule Take 2 capsules (80 mg) by mouth daily 60 capsule 1     liothyronine (CYTOMEL) 25 MCG tablet Take 1 tablet (25 mcg) by mouth daily 30 tablet 1     prazosin (MINIPRESS) 2 MG capsule Take 1 capsule (2 mg) by mouth At Bedtime 30 capsule 1     QUEtiapine (SEROQUEL) 50 MG tablet Take 1 tablet (50 mg) by mouth At Bedtime May take additional 25-50mg (1/2 - 1 tablet) daily prn for anxiety/panic attacks 45 tablet 1     atorvastatin (LIPITOR) 20 MG tablet Take 1 tablet (20 mg) by mouth daily 90 tablet 1     buprenorphine (SUBUTEX) 8 MG SUBL sublingual tablet Place 0.5-1 tablets (4-8 mg) under the tongue 4 times daily Max 2 tabs per day. Fill 01/26/23 to start 01/28/23 60 tablet 0     etanercept (ENBREL SURECLICK) 50 MG/ML autoinjector Inject 50 mg Subcutaneous once a week 1 mL 3     folic acid (FOLVITE) 1 MG tablet Take 3 tablets (3 mg) by mouth daily 270 tablet 2     medical cannabis (Patient's own supply) See Admin Instructions (The purpose of this order is to document that the patient reports taking medical cannabis.  This is not a prescription, and is not used to certify that the patient has a qualifying medical condition.)     Pills PRN   Lozenges PRN       methotrexate 2.5 MG tablet Take 5 tab once a week and following week increase to 6 tab once a week dose. 72 tablet 0     oxyCODONE (ROXICODONE) 5 MG tablet Take 1 tablet (5 mg) by mouth every 6 hours as needed for severe pain (7-10) Max of 3/day. Fill/begin 12/13/22. Short term script for acute pain flare. 42 tablet 0     vitamin D2 (ERGOCALCIFEROL) 33639 units (1250  "mcg) capsule TAKE 1 CAPSULE BY MOUTH EVERY MONDAY AND THURSDAY        VITALS   There were no vitals taken for this visit.     Pulse Readings from Last 5 Encounters:   12/08/22 89   11/23/22 72   09/22/22 66   08/31/22 65   05/02/22 72     Wt Readings from Last 5 Encounters:   12/08/22 131.5 kg (290 lb)   05/02/22 122.7 kg (270 lb 8 oz)   03/16/22 125.3 kg (276 lb 3.2 oz)   02/16/22 122.5 kg (270 lb)   12/30/21 123 kg (271 lb 2 oz)     BP Readings from Last 5 Encounters:   12/08/22 133/85   11/23/22 138/85   09/22/22 (!) 176/88   08/31/22 (!) 154/95   05/02/22 (!) 163/90        MENTAL STATUS EXAM     Alertness: alert , oriented and sleepy  Appearance: casually groomed, limited obs by video visit  Behavior/Demeanor: cooperative, pleasant and calm, with poor eye contact   Speech: normal and regular rate and rhythm  Language: intact and no problems  Psychomotor: normal or unremarkable and based on video visit, no abnormal moveemnts or tics were observed  Mood: \"mostly same, and fluctuates\"   Affect: guarded; congruent to: mood- yes, content- yes  Thought Process/Associations: unremarkable  Thought Content:  Reports none;  Denies suicidal & violent ideation and delusions, denies any plan or intent  Perception:  Reports none;  Denies auditory hallucinations and visual hallucinations  Insight: good  Judgment: fair  Cognition: does  appear grossly intact; formal cognitive testing was not done  Gait and Station: N/A ([facility])     LABS and DATA     PHQ 12/8/2022 1/5/2023 2/9/2023   PHQ-9 Total Score 14 18 17   Q9: Thoughts of better off dead/self-harm past 2 weeks Not at all Not at all Several days   F/U: Thoughts of suicide or self-harm - - No   F/U: Self harm-plan - - -   F/U: Self-harm action - - -   F/U: Safety concerns - - No       Recent Labs   Lab Test 12/08/22  1425 08/31/22  1405   CR 0.98 0.94   GFRESTIMATED >90 >90     Recent Labs   Lab Test 12/08/22  1425 08/31/22  1405 10/11/21  1123   AST 32 35 37   ALT 49 46 " 47   ALKPHOS 112  --  102       Recent Labs   Lab Test 12/08/22  1427 12/08/22  1425 10/11/21  1123   GLC  --  96 97   A1C 5.5  --  5.2     Recent Labs   Lab Test 12/08/22  1425 10/11/21  1123   CHOL 224* 209*   TRIG 230* 225*   * 134*   HDL 25* 30*     Recent Labs   Lab Test 12/08/22  1425 08/31/22  1405 06/16/21  0741 03/21/21  0808 03/17/21  0748   WBC 7.7 6.0   < > 6.8 8.3   ANEU  --   --   --  3.7 4.4   HGB 15.6 15.1   < > 15.6 15.0    213   < > 246 211    < > = values in this interval not displayed.     TSH   Date Value Ref Range Status   12/08/2022 1.19 0.40 - 4.00 mU/L Final   05/02/2022 1.46 0.40 - 4.00 mU/L Final   10/11/2021 1.37 0.40 - 4.00 mU/L Final   06/16/2021 0.65 0.40 - 4.00 mU/L Final   03/21/2021 2.39 0.40 - 4.00 mU/L Final   03/17/2021 2.01 0.40 - 4.00 mU/L Final   09/30/2016 1.62 0.40 - 4.00 mU/L Final     ECG 6/15/2021 QTc = 413 ms    EKG on 12/8/2022 QTc 403 ms  Sinus  Rhythm   -Incomplete right bundle branch block.    -Left atrial enlargement.      PSYCHOTROPIC DRUG INTERACTIONS                                                       PSYCHCLINICDDI     Increased risk of QTc prolongation: fluoxetine, quetiapine, Suboxone  Increased risk of serotonin syndrome: fluoxetine, Suboxone  Increased risk of CNS/respiratory depression: Suboxone, quetiapine    Prazosin and buprenorphine may have additive effects in lowering your blood pressure.    MANAGEMENT:  Monitoring for adverse effects and patient is aware of risks     RISK STATEMENT for SAFETY     Mr. Breen did not appear to be an imminent safety risk to self or others.     TREATMENT RISK STATEMENT: The risks, benefits, alternatives and potential adverse effects have been discussed and are understood by the pt. The pt understands the risks of using street drugs or alcohol. There are no medical contraindications, the pt agrees to treatment with the ability to do so. The pt knows to call the clinic for any problems or to access  emergency care if needed.  Medical and substance use concerns are documented above.  Psychotropic drug interaction check was done, including changes made today.     PROVIDER: Katie Corley MD, PhD    Patient staffed in clinic with Dr. Hunter who will sign the note.  Supervisor is Dr. Hunter.

## 2023-02-08 ENCOUNTER — MYC MEDICAL ADVICE (OUTPATIENT)
Dept: FAMILY MEDICINE | Facility: CLINIC | Age: 58
End: 2023-02-08
Payer: COMMERCIAL

## 2023-02-09 ENCOUNTER — VIRTUAL VISIT (OUTPATIENT)
Dept: PSYCHIATRY | Facility: CLINIC | Age: 58
End: 2023-02-09
Attending: PSYCHIATRY & NEUROLOGY
Payer: COMMERCIAL

## 2023-02-09 DIAGNOSIS — F43.10 PTSD (POST-TRAUMATIC STRESS DISORDER): ICD-10-CM

## 2023-02-09 DIAGNOSIS — F41.1 GENERALIZED ANXIETY DISORDER WITH PANIC ATTACKS: ICD-10-CM

## 2023-02-09 DIAGNOSIS — F33.1 MAJOR DEPRESSIVE DISORDER, RECURRENT EPISODE, MODERATE (H): ICD-10-CM

## 2023-02-09 DIAGNOSIS — F41.0 GENERALIZED ANXIETY DISORDER WITH PANIC ATTACKS: ICD-10-CM

## 2023-02-09 PROCEDURE — G0463 HOSPITAL OUTPT CLINIC VISIT: HCPCS | Mod: PN,GT | Performed by: STUDENT IN AN ORGANIZED HEALTH CARE EDUCATION/TRAINING PROGRAM

## 2023-02-09 PROCEDURE — 99214 OFFICE O/P EST MOD 30 MIN: CPT | Mod: GC | Performed by: STUDENT IN AN ORGANIZED HEALTH CARE EDUCATION/TRAINING PROGRAM

## 2023-02-09 NOTE — PROGRESS NOTES
Jailene Breen is a 57 year old who is being evaluated via a billable video visit.      Pt will join video visit via: [employer]  If there are problems joining the visit, send backup video invite via: Text to preferred phone: [phone]    Reason for telehealth visit: Patient has requested telehealth visit    Originating location (patient location): Patient's home    Will anyone else be joining the visit? No      Answers for HPI/ROS submitted by the patient on 2/9/2023  If you checked off any problems, how difficult have these problems made it for you to do your work, take care of things at home, or get along with other people?: Extremely difficult  PHQ9 TOTAL SCORE: 17

## 2023-02-09 NOTE — NURSING NOTE
Is the patient currently in the state of [place]? YES    Visit mode:VIDEO    If the visit is dropped, the patient can be reconnected by: VIDEO VISIT: Text to cell phone: [phone]    Will anyone else be joining the visit? NO      How would you like to obtain your AVS? MyChart    Are changes needed to the allergy or medication list? NO    Comments or concerns regarding today's visit:  Pt refused to review over medications list - pt stated there are no changes with medications.        NOE Andrew February 9, 2023 9:20 AM

## 2023-02-09 NOTE — PATIENT INSTRUCTIONS
**For crisis resources, please see the information at the end of this document**   Patient Education    Thank you for coming to the [facility] [place] [facility].     Lab Testing:  If you had lab testing today and your results are reassuring or normal they will be mailed to you or sent through ClickScanShare within 7 days. If the lab tests need quick action we will call you with the results. The phone number we will call with results is # [phone]. If this is not the best number please call our clinic and change the number.     Medication Refills:  If you need any refills please call your pharmacy and they will contact us. Our fax number for refills is [phone].   Three business days of notice are needed for general medication refill requests.   Five business days of notice are needed for controlled substance refill requests.   If you need to change to a different pharmacy, please contact the new pharmacy directly. The new pharmacy will help you get your medications transferred.     Contact Us:  Please call [phone] during business hours (8-5:00 M-F).   If you have medication related questions after clinic hours, or on the weekend, please call [phone].     Financial Assistance [phone]   Medical Records [phone]       MENTAL HEALTH CRISIS RESOURCES:  For a emergency help, please call 911 or go to the nearest Emergency Department.     Emergency Walk-In Options:   EmPATH Unit @ [facility] ([place]): [phone] - Specialized mental health emergency area designed to be calming  [facility] ([place]): [phone]  [facility] ([place]): [phone]  [facility]): [phone]    [facility] Crisis Information:   [place]: [phone]  [place]: [phone]  [place] ([place]) - Adult: [phone]     Child: [phone]  [place] - Adult: [phone]     Child: [phone]  [place]:  [phone]  List of all [facility] resources:   https://[place].gov/dhs/people-we-serve/adults/health-care/mental-health/resources/crisis-contacts.jsp    National Crisis Information:   Crisis Text Line: Text  [place]  to 144681  Suicide & Crisis Lifeline: 988  National Suicide Prevention Lifeline: 8-903-156-TALK (1-527.275.1304)       For online chat options, visit https://suicidepreventionlifeline.org/chat/  Poison Control Center: 5-707-396-2024  Trans Lifeline: 2-591-771-6526 - Hotline for transgender people of all ages  The Tu Project: 3-001-089-6350 - Hotline for LGBT youth     For Non-Emergency Support:   Fast Tracker: Mental Health & Substance Use Disorder Resources -   https://www.[employer].org/

## 2023-02-10 RX ORDER — FLUOXETINE 40 MG/1
80 CAPSULE ORAL DAILY
Qty: 60 CAPSULE | Refills: 1 | Status: SHIPPED | OUTPATIENT
Start: 2023-02-10 | End: 2023-03-02

## 2023-02-10 RX ORDER — LIOTHYRONINE SODIUM 25 UG/1
25 TABLET ORAL DAILY
Qty: 30 TABLET | Refills: 1 | Status: SHIPPED | OUTPATIENT
Start: 2023-02-10 | End: 2023-03-02

## 2023-02-10 RX ORDER — PRAZOSIN HYDROCHLORIDE 2 MG/1
2 CAPSULE ORAL AT BEDTIME
Qty: 30 CAPSULE | Refills: 1 | Status: SHIPPED | OUTPATIENT
Start: 2023-02-10 | End: 2023-03-30

## 2023-02-10 RX ORDER — QUETIAPINE FUMARATE 50 MG/1
50 TABLET, FILM COATED ORAL AT BEDTIME
Qty: 45 TABLET | Refills: 1 | Status: SHIPPED | OUTPATIENT
Start: 2023-02-10 | End: 2023-03-02

## 2023-02-28 ENCOUNTER — MYC MEDICAL ADVICE (OUTPATIENT)
Dept: RHEUMATOLOGY | Facility: CLINIC | Age: 58
End: 2023-02-28
Payer: COMMERCIAL

## 2023-02-28 ENCOUNTER — MYC REFILL (OUTPATIENT)
Dept: RHEUMATOLOGY | Facility: CLINIC | Age: 58
End: 2023-02-28
Payer: COMMERCIAL

## 2023-02-28 DIAGNOSIS — M06.9 RHEUMATOID ARTHRITIS INVOLVING MULTIPLE SITES, UNSPECIFIED WHETHER RHEUMATOID FACTOR PRESENT (H): ICD-10-CM

## 2023-02-28 NOTE — TELEPHONE ENCOUNTER
Duplicate request.      BARBARA Paulson  Rheumatology/Infectious disease  [facility]   Rheumatology ph:[phone]  Infectious Disease ph:[phone]

## 2023-02-28 NOTE — TELEPHONE ENCOUNTER
methotrexate 2.5 MG tablet Take 5 tab once a week and following week increase to 6 tab once a week dose  Last Written Prescription Date:  11/15/22  Last Fill Quantity: 72,   # refills: 0  Last Office Visit: 11/15/22  Plan   Increase methotrexate to 5 tablets next week and then following week increase it to 6 tablets once a week   Continue folic acid daily   Continue Enbrel 50 mg subcu once a week   Methotrexate monitoring labs in 1 month and then every 3 months   Follow-up in 6 months.   Future Office visit:  5/16/22    CBC RESULTS: Recent Labs   Lab Test 12/08/22  1425   WBC 7.7   RBC 5.31   HGB 15.6   HCT 44.9   MCV 85   MCH 29.4   MCHC 34.7   RDW 12.6          Creatinine   Date Value Ref Range Status   12/08/2022 0.98 0.66 - 1.25 mg/dL Final   06/16/2021 1.09 0.66 - 1.25 mg/dL Final   ]    Liver Function Studies -   Recent Labs   Lab Test 12/08/22  1425   PROTTOTAL 8.2   ALBUMIN 4.0   BILITOTAL 0.6   ALKPHOS 112   AST 32   ALT 49       Routing refill request to provider( clinic>labs) for review/approval because:  DMARD, labs 12/8/22. Next appt 5/16/23

## 2023-02-28 NOTE — TELEPHONE ENCOUNTER
[employer] message sent to patient.      BARBARA Paulson  Rheumatology/Infectious disease  [facility]   Rheumatology ph:[phone]  Infectious Disease ph:[phone]

## 2023-03-01 ENCOUNTER — MYC REFILL (OUTPATIENT)
Dept: PALLIATIVE MEDICINE | Facility: CLINIC | Age: 58
End: 2023-03-01
Payer: COMMERCIAL

## 2023-03-01 DIAGNOSIS — M54.59 LUMBAR FACET JOINT PAIN: ICD-10-CM

## 2023-03-01 DIAGNOSIS — M05.79 RHEUMATOID ARTHRITIS INVOLVING MULTIPLE SITES WITH POSITIVE RHEUMATOID FACTOR (H): ICD-10-CM

## 2023-03-01 DIAGNOSIS — M25.551 HIP PAIN, RIGHT: ICD-10-CM

## 2023-03-01 DIAGNOSIS — F11.20 UNCOMPLICATED OPIOID DEPENDENCE (H): ICD-10-CM

## 2023-03-01 DIAGNOSIS — M51.369 DDD (DEGENERATIVE DISC DISEASE), LUMBAR: ICD-10-CM

## 2023-03-01 DIAGNOSIS — M45.7 ANKYLOSING SPONDYLITIS OF LUMBOSACRAL REGION (H): ICD-10-CM

## 2023-03-01 DIAGNOSIS — F11.90 CHRONIC, CONTINUOUS USE OF OPIOIDS: ICD-10-CM

## 2023-03-02 ENCOUNTER — VIRTUAL VISIT (OUTPATIENT)
Dept: PSYCHIATRY | Facility: CLINIC | Age: 58
End: 2023-03-02
Attending: PSYCHIATRY & NEUROLOGY
Payer: COMMERCIAL

## 2023-03-02 DIAGNOSIS — F41.0 GENERALIZED ANXIETY DISORDER WITH PANIC ATTACKS: ICD-10-CM

## 2023-03-02 DIAGNOSIS — F33.1 MAJOR DEPRESSIVE DISORDER, RECURRENT EPISODE, MODERATE (H): ICD-10-CM

## 2023-03-02 DIAGNOSIS — F41.1 GENERALIZED ANXIETY DISORDER WITH PANIC ATTACKS: ICD-10-CM

## 2023-03-02 PROCEDURE — 99207 PR SERVICE NOT STAFFED W/SUPERV PROV: CPT | Mod: VID | Performed by: STUDENT IN AN ORGANIZED HEALTH CARE EDUCATION/TRAINING PROGRAM

## 2023-03-02 RX ORDER — QUETIAPINE FUMARATE 50 MG/1
50 TABLET, FILM COATED ORAL AT BEDTIME
Qty: 45 TABLET | Refills: 1 | Status: SHIPPED | OUTPATIENT
Start: 2023-03-02 | End: 2023-03-31

## 2023-03-02 RX ORDER — BUPRENORPHINE 8 MG/1
4-8 TABLET SUBLINGUAL 4 TIMES DAILY
Qty: 60 TABLET | Refills: 0 | Status: SHIPPED | OUTPATIENT
Start: 2023-03-02 | End: 2023-04-04

## 2023-03-02 RX ORDER — LIOTHYRONINE SODIUM 25 UG/1
25 TABLET ORAL DAILY
Qty: 30 TABLET | Refills: 1 | Status: SHIPPED | OUTPATIENT
Start: 2023-03-02 | End: 2023-03-31

## 2023-03-02 RX ORDER — FLUOXETINE 40 MG/1
80 CAPSULE ORAL DAILY
Qty: 60 CAPSULE | Refills: 1 | Status: SHIPPED | OUTPATIENT
Start: 2023-03-02 | End: 2023-03-31

## 2023-03-02 NOTE — TELEPHONE ENCOUNTER
Received call from patient requesting refill(s) of buprenorphine (SUBUTEX) 8 MG SUBL sublingual tablet     Last dispensed from pharmacy on 01/26/23    Patient's last office/virtual visit by prescribing provider on 11/23/22  Next office/virtual appointment scheduled for 03/23/23    Last urine drug screen date 08/31/22  Current opioid agreement on file (completed within the last year) Yes Date of opioid agreement: 05/06/22    E-prescribe to   [id number] [place] [id number] [address]  Phone: [phone] Fax: [phone]    Will route to nursing [place] for review and preparation of prescription(s).       Joy Kessler MA  [facility]

## 2023-03-02 NOTE — TELEPHONE ENCOUNTER
Refills have been requested for the following medications:         buprenorphine (SUBUTEX) 8 MG SUBL sublingual tablet [Susana GRANT CNP]     Preferred pharmacy: [id number] - [place], [place] - [address]

## 2023-03-02 NOTE — TELEPHONE ENCOUNTER
Medication/Dose: Methotrexate/Denied: Duplicate    Last Written : 2/28/23  Last Quantity: 72, # refills: 0    This is a duplicate request which has been declined. Pharmacy notified via E-Prescribe    LETHA Lamar, RN  [facility] Refill Team

## 2023-03-02 NOTE — TELEPHONE ENCOUNTER
Medication refill information reviewed.     Due date for buprenorphine (SUBUTEX) 8 MG SUBL sublingual tablet is 03/02/23     Prescriptions prepped for review.     Will route to provider.

## 2023-03-02 NOTE — PATIENT INSTRUCTIONS
**For crisis resources, please see the information at the end of this document**   Patient Education    Thank you for coming to the [facility] [place] [facility].     Lab Testing:  If you had lab testing today and your results are reassuring or normal they will be mailed to you or sent through [email or web address] within 7 days. If the lab tests need quick action we will call you with the results. The phone number we will call with results is # [phone]. If this is not the best number please call our clinic and change the number.     Medication Refills:  If you need any refills please call your pharmacy and they will contact us. Our fax number for refills is [phone].   Three business days of notice are needed for general medication refill requests.   Five business days of notice are needed for controlled substance refill requests.   If you need to change to a different pharmacy, please contact the new pharmacy directly. The new pharmacy will help you get your medications transferred.     Contact Us:  Please call [phone] during business hours (8-5:00 M-F).   If you have medication related questions after clinic hours, or on the weekend, please call [phone].     Financial Assistance [phone]   Medical Records [phone]       MENTAL HEALTH CRISIS RESOURCES:  For a emergency help, please call 911 or go to the nearest Emergency Department.     Emergency Walk-In Options:   EmPATH Unit @ [facility] ([place]): [phone] - Specialized mental health emergency area designed to be calming  [facility] ([place]): [phone]  [facility] ([place]): [phone]  [facility]): [phone]    [facility] Crisis Information:   [place]: [phone]  [place]: [phone]  [place] ([place]) - Adult: [phone]     Child: [phone]  [place] - Adult: [phone]     Child: [phone]  [place]:  [phone]  List of all [facility] resources:   [place]://[place].gov[place]dhs/people-we-serve/adults/health-care/mental-health/resources/crisis-contacts.jsp    National Crisis Information:   Crisis Text Line: Text  [place]  to 671719  Suicide & Crisis Lifeline: 988  National Suicide Prevention Lifeline: 6-250-327-TALK (1-433.746.7985)       For online chat options, visit https://suicidepreventionlifeline.org/chat/  Poison Control Center: 5-033-336-9534  Trans Lifeline: 8-231-611-9166 - Hotline for transgender people of all ages  The Tu Project: 2-495-001-8031 - Hotline for LGBT youth     For Non-Emergency Support:   Fast Tracker: Mental Health & Substance Use Disorder Resources -   https://www.[employer].org/

## 2023-03-02 NOTE — TELEPHONE ENCOUNTER
Medication/Dose: methotrexate 2.5 MG tablet    Last Written : 2/28/23  Last Quantity: 72, # refills: 0  Last Office Visit :  11/15/22  Pending appointment: 5/16/23     This is a duplicate request which has been declined. Pharmacy notified via E-Prescribe    LETHA Lamar, RN  [facility] Refill Team

## 2023-03-02 NOTE — TELEPHONE ENCOUNTER
Signed Prescriptions:                        Disp   Refills    buprenorphine (SUBUTEX) 8 MG SUBL sublingu*60 tab*0        Sig: Place 0.5-1 tablets (4-8 mg) under the tongue 4 times           daily Max 2 tabs per day. Fill/start 03/02/23.  Authorizing Provider: SUSANA PETTY        Reviewed [place]  March 2, 2023- no concerning fills.    Susana Petty APRN, RN CNP, FNP  [facility]

## 2023-03-02 NOTE — NURSING NOTE
Is the patient currently in the state of [place]? YES    Visit mode:VIDEO    If the visit is dropped, the patient can be reconnected by: VIDEO VISIT: Text to cell phone:   Telephone Information:   Mobile [phone]       Will anyone else be joining the visit? No  (If patient encounters technical issues they should call [phone])    How would you like to obtain your AVS? MyChart    Are changes needed to the allergy or medication list? NO changes to medications per Les    Rooming Documentation: PHQ was not assigned, not done per department protocol    Reason for visit:  follow up    NOE Rhodes

## 2023-03-03 NOTE — TELEPHONE ENCOUNTER
[employer] does not have pt portion, I sent patient a message asking if it would easier to email me the forms

## 2023-03-30 ENCOUNTER — VIRTUAL VISIT (OUTPATIENT)
Dept: PSYCHIATRY | Facility: CLINIC | Age: 58
End: 2023-03-30
Attending: PSYCHIATRY & NEUROLOGY
Payer: COMMERCIAL

## 2023-03-30 DIAGNOSIS — F33.1 MAJOR DEPRESSIVE DISORDER, RECURRENT EPISODE, MODERATE (H): ICD-10-CM

## 2023-03-30 DIAGNOSIS — F41.0 GENERALIZED ANXIETY DISORDER WITH PANIC ATTACKS: ICD-10-CM

## 2023-03-30 DIAGNOSIS — F41.1 GENERALIZED ANXIETY DISORDER WITH PANIC ATTACKS: ICD-10-CM

## 2023-03-30 PROCEDURE — 99214 OFFICE O/P EST MOD 30 MIN: CPT | Mod: GC | Performed by: STUDENT IN AN ORGANIZED HEALTH CARE EDUCATION/TRAINING PROGRAM

## 2023-03-30 PROCEDURE — G0463 HOSPITAL OUTPT CLINIC VISIT: HCPCS | Mod: PN,GT | Performed by: STUDENT IN AN ORGANIZED HEALTH CARE EDUCATION/TRAINING PROGRAM

## 2023-03-30 ASSESSMENT — PATIENT HEALTH QUESTIONNAIRE - PHQ9
SUM OF ALL RESPONSES TO PHQ QUESTIONS 1-9: 11
10. IF YOU CHECKED OFF ANY PROBLEMS, HOW DIFFICULT HAVE THESE PROBLEMS MADE IT FOR YOU TO DO YOUR WORK, TAKE CARE OF THINGS AT HOME, OR GET ALONG WITH OTHER PEOPLE: VERY DIFFICULT
SUM OF ALL RESPONSES TO PHQ QUESTIONS 1-9: 11

## 2023-03-30 NOTE — PATIENT INSTRUCTIONS
**For crisis resources, please see the information at the end of this document**   Patient Education    Thank you for coming to the [facility] [place] [facility].     Lab Testing:  If you had lab testing today and your results are reassuring or normal they will be mailed to you or sent through [employer] within 7 days. If the lab tests need quick action we will call you with the results. The phone number we will call with results is # [phone]. If this is not the best number please call our clinic and change the number.     Medication Refills:  If you need any refills please call your pharmacy and they will contact us. Our fax number for refills is [phone].   Three business days of notice are needed for general medication refill requests.   Five business days of notice are needed for controlled substance refill requests.   If you need to change to a different pharmacy, please contact the new pharmacy directly. The new pharmacy will help you get your medications transferred.     Contact Us:  Please call [phone] during business hours (8-5:00 M-F).   If you have medication related questions after clinic hours, or on the weekend, please call [phone].     Financial Assistance [phone]   Medical Records [phone]       MENTAL HEALTH CRISIS RESOURCES:  For a emergency help, please call 911 or go to the nearest Emergency Department.     Emergency Walk-In Options:   EmPATH Unit @ [facility] ([place]): [phone] - Specialized mental health emergency area designed to be calming  [facility] ([place]): [phone]  [facility] ([place]): [phone]  [facility]): [phone]    [facility] Crisis Information:   [place]: [phone]  [place]: [phone]  [place] ([place]) - Adult: [phone]     Child: [phone]  [place] - Adult: [phone]     Child: [phone]  [place]:  [phone]  List of all [facility] resources:   [place]://[place]dhs/people-we-serve/adults/health-care/mental-health/resources/crisis-contacts.jsp    National Crisis Information:   Crisis Text Line: Text  [place]  to 258655  Suicide & Crisis Lifeline: 988  National Suicide Prevention Lifeline: 6-826-650-TALK (1-206.750.1547)       For online chat options, visit https://suicidepreventionlifeline.org/chat/  Poison Control Center: 1-721-705-5760  Trans Lifeline: 8-349-413-2054 - Hotline for transgender people of all ages  The Tu Project: 0-408-446-3759 - Hotline for LGBT youth     For Non-Emergency Support:   Fast Tracker: Mental Health & Substance Use Disorder Resources -   https://www.[employer].org/

## 2023-03-30 NOTE — NURSING NOTE
Is the patient currently in the state of [place]? YES    Visit mode:VIDEO    If the visit is dropped, the patient can be reconnected by: VIDEO VISIT: Text to cell phone:   Telephone Information:   Mobile [phone]       Will anyone else be joining the visit? No  (If patient encounters technical issues they should call [phone])    How would you like to obtain your AVS? MyChart    Are changes needed to the allergy or medication list? NO  Patient denies any changes since echeck-in regarding medication and allergies and states all information entered during echeck-in remains accurate.  Patient declined individual medication review.    Reason for visit:  follow up    Maai Upton, NOE

## 2023-03-30 NOTE — TELEPHONE ENCOUNTER
Central Prior Authorization Team   Phone: [phone]    Prior Authorization Approval    Authorization Effective Date: 8/30/2019  Authorization Expiration Date: 2/29/2020  Medication: morphine (MS CONTIN) 15 MG CR tablet  Approved Dose/Quantity:   Reference #: [id number]   Insurance Company: [employer] [place] - Phone [phone] Fax [phone]  Expected CoPay:       CoPay Card Available:      Foundation Assistance Needed:    Which Pharmacy is filling the prescription (Not needed for infusion/clinic administered): [id number] - [place], [place] - [address]  Pharmacy Notified: Yes  Patient Notified: Yes, sent patient a [employer] message regarding approval.       Refer to the Assessment tab to view/cancel completed assessment.

## 2023-03-30 NOTE — PROGRESS NOTES
Virtual Visit Details    Type of service:  Video Visit   Video Start Time: 8:50 AM  Video End Time:9:10 AM    Originating Location (pt. Location): Home    Distant Location (provider location):  On-site  Platform used for Video Visit: [facility]  Psychiatry Clinic  MEDICAL PROGRESS NOTE     CARE TEAM:  PCP- Aiden Resendez    Psychotherapist- None       This person is a 57 year old who uses the name Jamison and pronouns he, him, his.      DIAGNOSIS     - Major depressive disorder, recurrent, moderate  - Generalized anxiety disorder, with panic  - PTSD     Medical Diagnoses:  - Chronic pain  - Rheumatoid arthritis  - Ankylosing spondylitis ([id number] positive)  - Hypertension     ASSESSMENT       Mr. Breen reports that he has been doing well. He feels that his current medication regimen is working.     He self-discontinued prazosin to 2 mg due to sedation a couple of months ago. Feels that he does not need it anymore. Report some recurring dreams and nightmares; they are manageable. A sleep medicine referral was made previously; the contact number was shared with him several times to follow up on this referral. He continues to be interested in restarting therapy but has not contacted [facility] regarding his earlier referral.    He had a PCP visit last fall, received preventive counseling, and patient is aware of the weight management referral. Monitoring weight changes closely.     Denies suicidal ideation, any intent, or plan. No reports of medication side effects.     If his symptoms returns, we may consider titration from fluoxetine to an SNRI.     The patient understands the risks, benefits, adverse effects, and alternatives. Agrees to treatment with the capacity to do so. No medical contraindications to treatment. Agrees to call the clinic for any problems. The patient understands to call 911 or come to the nearest ED if life-threatening or urgent  "symptoms present.    [facility]     PLAN                                                                                                                1) Medications:   - Continue fluoxetine 80 mg daily  - Continue quetiapine 50 mg at bedtime  - Continue quetiapine 25-50 mg as needed for anxiety and panic (rarely taking 25 mg at bedtime)  - Continue liothyronine 25 mcg daily    Other:  - Suboxone  - Methotrexate     2) Therapy- Recommended   Patient is interested; psychotherapy; he agreed to follow up the previous referral order    3) Next Due   Labs - AP labs last done on 12/2022  EKG - last EKG 12/8/2022.  Rating scales- AIMS     4) Referral -   - Patient will follow up sleep medicine and psychotherapy referrals     5) Other - Nurse partner check-in two weeks     6) RTC: 4-6 weeks     7) Crisis Numbers:   Provided routinely in AVS   After hours:  [phone]     PERTINENT BACKGROUND                                                    [most recent eval 07/13/22]     Originally, diagnosed with depression and anxiety in 2015 but first experienced symptoms of depression in middle school. He most recently had a significant worsening of symptoms in 2017 after he was diagnosed with rheumatoid arthritis, ankylosing spondylitis ended up with a knee injury and was no longer able to work or run which he used as his primary coping mechanism.    Pertinent Items Include: suicidal ideation, SIB [cutting], multiple psychotropic trials , severe med reaction, psych hosp (<3), SUBSTANCE USE: alcohol, substance use treatment  and Major Medical Problems (Rheumatoid Arthritis and Ankylosing Spondylitis)     SUBJECTIVE     - Mr. Breen reports that his mood \"good.\". \"Depression is better.\"   - States that a lot has been going on in his personal life, his uncle is dying of cancer, and his mom still does not want to talk with him. \"It is hurtful.\"  - Says, \"to tell you the truth, it is better if I don't talk to " "her.\"  - Talking to his biological dad, \"he and I are finally getting close.\"   - He met his biological dad for the first time in  before he got ; he lives in [place]. He texts his dad regularly  - He does not talk with sisters and brothers  - His children are doing well, his daughter is living independently, and his son got a new car, \"he makes more money than his mom, and I make together.\"  - Daughter suffers from depression, \"struggling but on the right path.\"   - Still has nightmares, \"nightmares are situationally related to trauma.\" Last night he slept 8 hrs.   - Denies any change in substance use.   - Talking with his  regularly   - Denies suicidal ideation, intent, or plan today; denies any safety concerns.    Recent Psych Symptoms:   Depression:  anhedonia and low energy; \"does not feel as bad,\" sxs improved   Elevated:  none  Psychosis:  none  Anxiety:  improved, no panic attacks since the last visit  Trauma Related:  nightmares, flashbacks, negative beliefs / emotions and hypervigilance, nightmares about child trauma,  Sleep: \"Okay\"    Adverse Effects:  denies  Pertinent Negative Symptoms: No suicidal ideation, violent ideation, aggression, psychosis, hallucinations, delusions, otto and hypomania    Recent Substance Use:     Alcohol- none since , used to attend AA meetings prior to COVID, no plans to start  Tobacco- denies  Caffeine- no, Mr.   Cannabis- denies     Opioids- as prescribed           Narcan Kit- prescribed   Other illicit drugs- none     FAMILY and SOCIAL HISTORY                                 pt reported     Family Hx:  Mom - depression (since  when sister )     Social Hx:  Financial/ Work- planning to attend [employer] for a degree in psychology; unable to work due to diagnosis of rheumatoid arthritis and ankylosing spondylitis, on disability   Partner/ -  in   Children- 26 and 28 year old  Living situation- [place], house with wife and " daughter 27 yo at home, son is in [place], three dogs    Social/ Spiritual Support- Talks to  every two weeks, not many friends, family is supportive, likes walking and hiking     PAST PSYCHIATRIC HISTORY     SIB- Hx of cutting/carving of skin   Suicide Attempt [#, most recent]- No   Suicidal Ideation Hx- No   Violence/Aggression Hx- No, not now  Psychosis Hx- No   Eating Disorder Hx- No   Psych Hosp [#, most recent]- Yes, 3-4 times, 10 days longest  Commitment- No   TMS/ECT- No   Outpatient Programs - Yes, CD treatment at [facility], in patient and outpatient program     PAST MED TRIALS       Medication  Dose   (mg) Effect  Dates of Use   Fluoxetine 40-80 Felt like was initially helpful 2016 - 2017 2022-2023   Sertraline 100 Effective initially, eventually self discontinued as not helpful 2018 - 7/2020   Duloxetine 30 BID Used to treat nerve pain; felt weird on it 2017   Bupropion  BID ineffective 2017 - 2019   Nortriptyline 50 For pain 2017 -  3/2019   Mirtazapine   Dissociated for entire first week after taking 2012   Trazodone 50   2013             Hydroxyzine 25 QID prn For anxiety and panic attacks; not effective for severe anxiety 2015 - 2017   Buspirone 15 TID   2016 - 2017   Gabapentin 100 TID   2013 - 2016   Alprazolam 0.5 TID For anxiety 6095-9750   Diazepam 2 BID For anxiety 2016   Lorazepam 1 TID For anxiety 2016   Quetiapine  25-50  [id number]   Clonazepam 1 For anxiety 2016        PSYCH CRITICAL SUMMARY POINTS         [From Dr. Sage's Notes on 6/8/2022:  9/20 - started escitalopram  11/20 - increased escitalopram 20mg daily  2/21 - stopped hydroxyzine; started trazodone 50mg at bedtime and 25mg daily as needed for anxiety  3/21 - 4/22 - hospitalized started on Abilify, titrated to 5mg daily; escitalopram switched to fluoxetine 40mg daily; started prazosin 1mg at bedtime. Pain management switched oxycodone to Suboxone and started medical marijuana.  5/21  - patient still taking hydroxyzine,  discontinued today.  6/21  - patient hospitalized (6/15/21) discontinued Abilify and trazodone, started on quetiapine & titrated to 100mg at bedtime and 25mg as needed for sleep.   8/21 - increase quetiapine 150mg   11/21  - discontinued quetiapine 150mg (patient ran out of medications and did not take for several weeks).  12/21 - patient restarted quetiapine 50mg at bedtime   3/22 - increased fluoxetine 60mg]    7/14/2022: Transfer visit.  Changed prazosin from PRN to scheduled at bedtime  11/3/2022: No medication changes. Elevated BP history discussed. Patient agreed to make a PCP appointment.   12/1/2022: No medication changes.   1/5/2023:  Increased prazosin to 2 mg at bedtime  2/9/2023:  Return of depressive symptoms after her daughter moved out from the family home. Increased fluoxetine from 60 mg daily to 80 mg daily.  3/2/2023:  Patient self-discontinued prazosin 2 mg due sedation.  No other changes to medications. Depression improved.   3/30/2023:Doing well. No medication changes.      MEDICAL HISTORY and ALLERGY     ALLERGIES: Mirtazapine, Hydrocodone, Ms contin [morphine], and Remeron soltab    Patient Active Problem List   Diagnosis     CARDIOVASCULAR SCREENING; LDL GOAL LESS THAN 160     Obesity, Class I, BMI 30-34.9     Tear meniscus knee, right, initial encounter     Rheumatoid arthritis involving multiple sites, unspecified rheumatoid factor presence     Chronic pain     Right-sided thoracic back pain, unspecified chronicity     Generalized anxiety disorder     Panic attack     Ankylosing spondylitis (H)     Moderate major depression (H)     Immunocompromised (H)     Essential hypertension with goal blood pressure less than 140/90     Suicidal ideation     Insomnia due to other mental disorder     Major depressive disorder, recurrent episode, severe with mixed features (H)     Chronic back pain greater than 3 months duration     Contact dermatitis and eczema     Dermatitis     Drug dependence (H)      Encounter for screening colonoscopy     Esophageal reflux     Hematuria     Hyperlipidemia     Lumbar radiculopathy     Plantar fascial fibromatosis     Sprain of sacroiliac region     Overweight     Nonintractable migraine     Narcotic abuse, continuous (H)     Morbid obesity (H)        MEDICAL REVIEW OF SYSTEMS     Contraception- none    A comprehensive review of systems was performed and is negative other than noted in the HPI.      MEDICATIONS     Current Outpatient Medications   Medication Sig Dispense Refill     atorvastatin (LIPITOR) 20 MG tablet Take 1 tablet (20 mg) by mouth daily 90 tablet 1     buprenorphine (SUBUTEX) 8 MG SUBL sublingual tablet Place 0.5-1 tablets (4-8 mg) under the tongue 4 times daily Max 2 tabs per day. Fill/start 03/02/23. 60 tablet 0     etanercept (ENBREL SURECLICK) 50 MG/ML autoinjector Inject 50 mg Subcutaneous once a week 1 mL 3     FLUoxetine (PROZAC) 40 MG capsule Take 2 capsules (80 mg) by mouth daily 60 capsule 1     folic acid (FOLVITE) 1 MG tablet Take 3 tablets (3 mg) by mouth daily 270 tablet 2     liothyronine (CYTOMEL) 25 MCG tablet Take 1 tablet (25 mcg) by mouth daily 30 tablet 1     medical cannabis (Patient's own supply) See Admin Instructions (The purpose of this order is to document that the patient reports taking medical cannabis.  This is not a prescription, and is not used to certify that the patient has a qualifying medical condition.)     Pills PRN   Lozenges PRN       methotrexate 2.5 MG tablet Take 6 tablets (15 mg) by mouth every 7 days 72 tablet 0     oxyCODONE (ROXICODONE) 5 MG tablet Take 1 tablet (5 mg) by mouth every 6 hours as needed for severe pain (7-10) Max of 3/day. Fill/begin 12/13/22. Short term script for acute pain flare. 42 tablet 0     QUEtiapine (SEROQUEL) 50 MG tablet Take 1 tablet (50 mg) by mouth At Bedtime May take additional 25-50mg (1/2 - 1 tablet) daily prn for anxiety/panic attacks 45 tablet 1     vitamin D2 (ERGOCALCIFEROL) 67863  "units (1250 mcg) capsule TAKE 1 CAPSULE BY MOUTH EVERY MONDAY AND THURSDAY        VITALS   There were no vitals taken for this visit.     Pulse Readings from Last 5 Encounters:   12/08/22 89   11/23/22 72   09/22/22 66   08/31/22 65   05/02/22 72     Wt Readings from Last 5 Encounters:   12/08/22 131.5 kg (290 lb)   05/02/22 122.7 kg (270 lb 8 oz)   03/16/22 125.3 kg (276 lb 3.2 oz)   02/16/22 122.5 kg (270 lb)   12/30/21 123 kg (271 lb 2 oz)     BP Readings from Last 5 Encounters:   12/08/22 133/85   11/23/22 138/85   09/22/22 (!) 176/88   08/31/22 (!) 154/95   05/02/22 (!) 163/90        MENTAL STATUS EXAM     Alertness: alert  and oriented  Appearance: casually groomed, limited obs by video visit  Behavior/Demeanor: cooperative, pleasant and calm, with poor eye contact   Speech: normal and regular rate and rhythm  Language: intact and no problems  Psychomotor: normal or unremarkable and based on video visit, no abnormal moveemnts or tics were observed  Mood: \"good\"   Affect: guarded; congruent to: mood- yes, content- yes  Thought Process/Associations: unremarkable  Thought Content:  Reports none;  Denies suicidal & violent ideation and delusions, denies any plan or intent  Perception:  Reports none;  Denies auditory hallucinations and visual hallucinations  Insight: good  Judgment: fair  Cognition: does  appear grossly intact; formal cognitive testing was not done  Gait and Station: N/A ([facility])     LABS and DATA     PHQ 1/5/2023 2/9/2023 3/30/2023   PHQ-9 Total Score 18 17 11   Q9: Thoughts of better off dead/self-harm past 2 weeks Not at all Several days Not at all   F/U: Thoughts of suicide or self-harm - No -   F/U: Self harm-plan - - -   F/U: Self-harm action - - -   F/U: Safety concerns - No -       Recent Labs   Lab Test 12/08/22  1425 08/31/22  1405   CR 0.98 0.94   GFRESTIMATED >90 >90     Recent Labs   Lab Test 12/08/22  1425 08/31/22  1405 10/11/21  1123   AST 32 35 37   ALT 49 46 47   ALKPHOS 112  " --  102       Recent Labs   Lab Test 12/08/22  1427 12/08/22  1425 10/11/21  1123   GLC  --  96 97   A1C 5.5  --  5.2     Recent Labs   Lab Test 12/08/22  1425 10/11/21  1123   CHOL 224* 209*   TRIG 230* 225*   * 134*   HDL 25* 30*     Recent Labs   Lab Test 12/08/22  1425 08/31/22  1405 06/16/21  0741 03/21/21  0808 03/17/21  0748   WBC 7.7 6.0   < > 6.8 8.3   ANEU  --   --   --  3.7 4.4   HGB 15.6 15.1   < > 15.6 15.0    213   < > 246 211    < > = values in this interval not displayed.     TSH   Date Value Ref Range Status   12/08/2022 1.19 0.40 - 4.00 mU/L Final   05/02/2022 1.46 0.40 - 4.00 mU/L Final   10/11/2021 1.37 0.40 - 4.00 mU/L Final   06/16/2021 0.65 0.40 - 4.00 mU/L Final   03/21/2021 2.39 0.40 - 4.00 mU/L Final   03/17/2021 2.01 0.40 - 4.00 mU/L Final   09/30/2016 1.62 0.40 - 4.00 mU/L Final     ECG 6/15/2021 QTc = 413 ms    EKG on 12/8/2022 QTc 403 ms  Sinus  Rhythm   -Incomplete right bundle branch block.    -Left atrial enlargement.      PSYCHOTROPIC DRUG INTERACTIONS                                                       PSYCHCLINICDDI     Increased risk of QTc prolongation: fluoxetine, quetiapine, Suboxone  Increased risk of serotonin syndrome: fluoxetine, Suboxone  Increased risk of CNS/respiratory depression: Suboxone, quetiapine    Prazosin and buprenorphine may have additive effects in lowering your blood pressure.    MANAGEMENT:  Monitoring for adverse effects and patient is aware of risks     RISK STATEMENT for SAFETY     Mr. Breen did not appear to be an imminent safety risk to self or others.     TREATMENT RISK STATEMENT: The risks, benefits, alternatives and potential adverse effects have been discussed and are understood by the pt. The pt understands the risks of using street drugs or alcohol. There are no medical contraindications, the pt agrees to treatment with the ability to do so. The pt knows to call the clinic for any problems or to access emergency care if needed.   Medical and substance use concerns are documented above.  Psychotropic drug interaction check was done, including changes made today.     PROVIDER: Katie Corley MD, PhD    Patient staffed in clinic with Dr. Hunter who will sign the note.  Supervisor is Dr. Hunter.

## 2023-03-31 RX ORDER — LIOTHYRONINE SODIUM 25 UG/1
25 TABLET ORAL DAILY
Qty: 30 TABLET | Refills: 1 | Status: SHIPPED | OUTPATIENT
Start: 2023-03-31 | End: 2023-04-06

## 2023-03-31 RX ORDER — FLUOXETINE 40 MG/1
80 CAPSULE ORAL DAILY
Qty: 60 CAPSULE | Refills: 1 | Status: SHIPPED | OUTPATIENT
Start: 2023-03-31 | End: 2023-05-11

## 2023-03-31 RX ORDER — QUETIAPINE FUMARATE 50 MG/1
50 TABLET, FILM COATED ORAL AT BEDTIME
Qty: 45 TABLET | Refills: 1 | Status: SHIPPED | OUTPATIENT
Start: 2023-03-31 | End: 2023-05-11

## 2023-04-03 ENCOUNTER — TELEPHONE (OUTPATIENT)
Dept: FAMILY MEDICINE | Facility: CLINIC | Age: 58
End: 2023-04-03
Payer: COMMERCIAL

## 2023-04-03 DIAGNOSIS — F41.1 GENERALIZED ANXIETY DISORDER WITH PANIC ATTACKS: ICD-10-CM

## 2023-04-03 DIAGNOSIS — F33.1 MAJOR DEPRESSIVE DISORDER, RECURRENT EPISODE, MODERATE (H): ICD-10-CM

## 2023-04-03 DIAGNOSIS — F41.0 GENERALIZED ANXIETY DISORDER WITH PANIC ATTACKS: ICD-10-CM

## 2023-04-03 NOTE — TELEPHONE ENCOUNTER
Patient Quality Outreach    Patient is due for the following:       Topic Date Due     Hepatitis B Vaccine (1 of 3 - 3-dose series) Never done     COVID-19 Vaccine (3 - Moderna risk series) 10/26/2022       Next Steps:   Schedule a nurse only visit for Immunization    Type of outreach:    Sent [employer] message.      Questions for provider review:    None     Catherine Manuel

## 2023-04-03 NOTE — TELEPHONE ENCOUNTER
Received fax from [facility] pharmacy requesting 90 days supply for the Cytomel 25 mcg.    Dr. Corley,    Approve or deny ? If approve, please send in for 90 days.    Shila Solomon on 4/3/2023 at 10:52 AM

## 2023-04-06 RX ORDER — LIOTHYRONINE SODIUM 25 UG/1
25 TABLET ORAL DAILY
Qty: 90 TABLET | Refills: 0 | Status: SHIPPED | OUTPATIENT
Start: 2023-04-06 | End: 2023-06-16

## 2023-04-17 NOTE — PROGRESS NOTES
Jamison is a 57 year old who is being evaluated via a billable video visit.      How would you like to obtain your AVS? MyChart  If the video visit is dropped, the invitation should be resent by: Text to cell phone: [phone]  Will anyone else be joining your video visit? No      Subjective : Jailene Breen is a 57 year old male with PMH of anxiety, obesity, seropositive rheumatoid arthritis, possible ankylosing spondylitis evaluated via a billable virtual visit for management of seropositive rheumatoid arthritis.  He was diagnosed with seropositive rheumatoid arthritis in 2016, established care with Dr. Raygoza in 5/2016 and was started on methotrexate.  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 He was started on Humira in 12/2016 due to AS.  Discontinued methotrexate in 2/2017 due to lack of improvement. Humira was changed to Enbrel in 4/2019 due to lack of improvement.  Methotrexate was restarted in July 2020.     Interim History : In the last couple months he had flare up, it starts in his hands, ankles and then in his shoulders. Can not lift his arm up. Both the shoulders were hurting bad. He is having diarrhea for last couple months. He has started a new antidepressant. He is on Enbrel but has not taken Enbrel for a month due to diarrhea issues.  He thinks that Enbrel helps with his joint pains.  He is on methotrexate 4 tablets once a week but cannot tell if it helps him or not.  He has prednisone tablets and take 20 mg once a month when his symptoms worsens.  He does not like to take prednisone since his anxiety symptoms get worse.    He has lumbar degenerative disc disease and gets MAKAYLA.  He has noticed urinary retention issues sporadically.    July 16, 2021 - For the last few months has been struggling with depression.  He was admitted few times in the hospital  due to acute worsening of depression.  He is following up with his psychiatrist.  Few new medications including Seroquel was started.  He feels his symptoms are gradually getting better.  For the last few days he has noticed decrease in his appetite.  He has follow-up with his psychiatrist in few weeks and will discuss about that.  He is also on methotrexate 4 tablets once a week and Enbrel 50 subcu once a week for RA.  Overall his RA is stable.  He gets flareups once every 2 months.  Gets flare up he has pain in his hands, wrists, shoulders, feet.      November 15, 2022 - He is on Enbrel, he has not had his shot this week and he can feel some pain in his joints. If he does little bit activity he gets very sweaty and SOB. His cholesterol is very high.  He will follow-up with primary care to check his cholesterol, cardiac work-up, TSH and the hormone levels.  He is on Methotrexate 4 tab once a week. He denies any RA flares.     May 16, 2023 - Towards the end of the week he still feels that he is more achy when the effect of Enbrel wears off. He has depression issues and is working with his PCP.  He wants to see therapist but does not have appointment until September.  He is also on methotrexate 15 mg once a week.  He needs updated methotrexate monitoring labs.    Review of systems negative except for those mentioned above    Reviewed past medical, surgical, family history    Pertinent labs:     Component      Latest Ref [facility] 12/8/2022  2:25 PM   WBC      4.0 - 11.0 10e3/uL 7.7    RBC Count      4.40 - 5.90 10e6/uL 5.31    Hemoglobin      13.3 - 17.7 g/dL 15.6    Hematocrit      40.0 - 53.0 % 44.9    MCV      78 - 100 fL 85    MCH      26.5 - 33.0 pg 29.4    MCHC      31.5 - 36.5 g/dL 34.7    RDW      10.0 - 15.0 % 12.6    Platelet Count      150 - 450 10e3/uL 225    % Neutrophils      % 69    % Lymphocytes      % 21    % Monocytes      % 6    % Eosinophils      % 3    % Basophils      % 1    % Immature Granulocytes       % 0    Absolute Neutrophils      1.6 - 8.3 10e3/uL 5.3    Absolute Lymphocytes      0.8 - 5.3 10e3/uL 1.6    Absolute Monocytes      0.0 - 1.3 10e3/uL 0.4    Absolute Eosinophils      0.0 - 0.7 10e3/uL 0.3    Absolute Basophils      0.0 - 0.2 10e3/uL 0.0    Absolute Immature Granulocytes      <=0.4 10e3/uL 0.0    Sodium      133 - 144 mmol/L 141    Potassium      3.4 - 5.3 mmol/L 4.4    Chloride      94 - 109 mmol/L 107    Carbon Dioxide      20 - 32 mmol/L 29    Anion Gap      3 - 14 mmol/L 5    Urea Nitrogen      7 - 30 mg/dL 16    Creatinine      0.66 - 1.25 mg/dL 0.98    Calcium      8.5 - 10.1 mg/dL 9.1    Glucose      70 - 99 mg/dL 96    Alkaline Phosphatase      40 - 150 U/L 112    AST      0 - 45 U/L 32    ALT      0 - 70 U/L 49    Protein Total      6.8 - 8.8 g/dL 8.2    Albumin      3.4 - 5.0 g/dL 4.0    Bilirubin Total      0.2 - 1.3 mg/dL 0.6    GFR Estimate      >60 mL/min/1.73m2 >90      Physical exam : Moderately built, appear stated age, cooperative  Musculoskeletal: Reports tenderness over MCP, PIP joints, bilateral wrists.  Reports tenderness over bilateral ankles, MTP joints, lower back.      Assessment     Seropositive rheumatoid arthritis  History of ankylosing spondylitis based on MRI pelvis ( 6/2016 )  Rheumatoid factor-78, anti-CCP antibody > 340 - 4/2016  HLA-B27 positive  MRI SI joint-June 2016-partial ankylosis of left SI joint    Seropositive rheumatoid arthritis: He has seropositive rheumatoid arthritis (+ RF, + anti-CCP ) manifesting as a polyarticular symmetric arthritis.  He is on Enbrel 50 mg subcu once a week and Methotrexate 6 tab once a week.  He reports that effects of Enbrel wears off in 4 to 5 days.  Enbrel was helping him more before but now is not as effective as before.  Methotrexate helps him, denies any side effects with methotrexate use.  Due to chronic ache in his joints he feels very depressed.  His overall activity have reduced due to pain.  He is not on  prednisone.    He is interested in switching Enbrel to a different biologic.  We discussed about certolizumab pegol injection but it seems like there is no free drug program available for it and will need to use alternative drugs.  Other options include golimumab, abatacept, Actemra or Edis kinase inhibitors etc.  Will discuss with prior authorization team.     He also needs updated methotrexate monitoring labs.  Once labs are normal he can increase methotrexate to 8 tablets once a week.    Ankylosing spondylitis: He was given diagnosis of ankylosing spondylitis in 2016 after MRI of the SI joints showed partial ankylosis.  He is HLA-B27 positive.  Overall his symptoms are stable.     Vaccinations: Vaccinations reviewed with Mr Breen. Risks and benefits of vaccinations were discussed.  - Influenza: encouraged yearly vaccination  -Pneumococcal 23 vaccine: 11/2022  - Zostavax: received 2 doses  - COVID : 2 doses and booster done.     Depression: He feels very depressed due to chronic disease and pain.  He does not have suicidal ideas.  He follows with primary care provider for antidepressants.  Will refer him to behavioral health services for psychotherapy.      Plan     Methotrexate monitoring labs to be done.  If normal then increase methotrexate to 7 tablets and then following week to 8 tablets once a week dose.    We will discontinue Enbrel and start Cimzia if approved.    Start with Cimzia loading dose and then Cimzia 400 mg subcu injection monthly    Behavioral health services referral given for psychotherapy    Follow-up August 18th.         Video-Visit Details    Type of service:  Video Visit   Video Start Time: 9:23 AM  Video End Time: 9:50 AM     Originating Location (pt. Location): Home    Distant Location (provider location):  Off-site  Platform used for Video Visit: Cuba Parada MD

## 2023-04-29 ENCOUNTER — MYC MEDICAL ADVICE (OUTPATIENT)
Dept: PALLIATIVE MEDICINE | Facility: CLINIC | Age: 58
End: 2023-04-29
Payer: COMMERCIAL

## 2023-04-29 DIAGNOSIS — M05.79 RHEUMATOID ARTHRITIS INVOLVING MULTIPLE SITES WITH POSITIVE RHEUMATOID FACTOR (H): ICD-10-CM

## 2023-04-29 DIAGNOSIS — M51.369 DDD (DEGENERATIVE DISC DISEASE), LUMBAR: ICD-10-CM

## 2023-04-29 DIAGNOSIS — F11.20 UNCOMPLICATED OPIOID DEPENDENCE (H): ICD-10-CM

## 2023-04-29 DIAGNOSIS — M45.7 ANKYLOSING SPONDYLITIS OF LUMBOSACRAL REGION (H): ICD-10-CM

## 2023-04-29 DIAGNOSIS — M25.50 MULTIPLE JOINT PAIN: ICD-10-CM

## 2023-04-29 DIAGNOSIS — F11.90 CHRONIC, CONTINUOUS USE OF OPIOIDS: ICD-10-CM

## 2023-04-29 DIAGNOSIS — M54.59 LUMBAR FACET JOINT PAIN: ICD-10-CM

## 2023-04-29 DIAGNOSIS — M25.551 HIP PAIN, RIGHT: ICD-10-CM

## 2023-05-02 NOTE — TELEPHONE ENCOUNTER
Medication refill information reviewed.     Due date for buprenorphine (SUBUTEX) 8 MG SUBL sublingual tablet is 05/04/23     Anupam Gunterhart: Hello, could you please refill the oxycodone prescription.  I'm having a flare up and waiting to get my enbrel approved for another year again been out a couple weeks. And could you send it to [facility] [place]. Thank you    Oxycodone 5 MG pended for review.

## 2023-05-02 NOTE — TELEPHONE ENCOUNTER
Received call from patient requesting refill(s) buprenorphine (SUBUTEX) 8 MG SUBL sublingual tablet    Last dispensed from pharmacy on 04/04/2023    Patient's last office/virtual visit by prescribing provider on 11/23/2022  Next office/virtual appointment scheduled for 05/17/2023    Last urine drug screen date 08/31/2022  Current opioid agreement on file (completed within the last year) Yes Date of opioid agreement: 05/06/2022    E-prescribe to      [id number] - [place], [place] - [address] AT [address]      Will route to nursing [place] for review and preparation of prescription(s).     Ene Kincaid MA  [facility]

## 2023-05-03 ENCOUNTER — TELEPHONE (OUTPATIENT)
Dept: PALLIATIVE MEDICINE | Facility: CLINIC | Age: 58
End: 2023-05-03

## 2023-05-03 RX ORDER — OXYCODONE HYDROCHLORIDE 5 MG/1
5 TABLET ORAL EVERY 6 HOURS PRN
Qty: 42 TABLET | Refills: 0 | Status: SHIPPED | OUTPATIENT
Start: 2023-05-03 | End: 2023-05-17

## 2023-05-03 RX ORDER — BUPRENORPHINE 8 MG/1
4-8 TABLET SUBLINGUAL 4 TIMES DAILY
Qty: 60 TABLET | Refills: 0 | Status: SHIPPED | OUTPATIENT
Start: 2023-05-03 | End: 2023-05-17

## 2023-05-03 NOTE — TELEPHONE ENCOUNTER
Signed Prescriptions:                        Disp   Refills    buprenorphine (SUBUTEX) 8 MG SUBL sublingu*60 tab*0        Sig: Place 0.5-1 tablets (4-8 mg) under the tongue 4 times           daily Max 2 tabs per day. Fill 05/02/23 to start           05/04/23  Authorizing Provider: SUSANA PETTY    oxyCODONE (ROXICODONE) 5 MG tablet         42 tab*0        Sig: Take 1 tablet (5 mg) by mouth every 6 hours as needed           for severe pain Max of 3/day. Fill/begin           05/03/23. Short term script for acute pain flare.  Authorizing Provider: SUSANA PETTY        Reviewed [place]  May 3, 2023- no concerning fills.    Susana Petty APRN, RN CNP, FNP  [facility]  [facility] [place]

## 2023-05-03 NOTE — TELEPHONE ENCOUNTER
Prior Authorization Specialty Medication Request    Medication/Dose:oxyCODONE (ROXICODONE) 5 MG tablet   ICD code (if different than what is on RX):    Lumbar facet joint pain [M54.59]       DDD (degenerative disc disease), lumbar [M51.36]       Ankylosing spondylitis of lumbosacral region (H) [M45.7]       Rheumatoid arthritis involving multiple sites with positive rheumatoid factor (H) [M05.79]       Multiple joint pain [M25.50]           Previously Tried and Failed:      Important Lab Values:   Rationale:      Subscriber: [id number] FIONA SHULTZ     Rel to sub: 01 - Self     Member ID: [id number]     Payor: [facility] Ph: [phone]     Benefit plan: [facility] Ph: [phone]     Group number: [id number]     Member effective dates: from 04/01/22

## 2023-05-09 NOTE — PROGRESS NOTES
"Virtual Visit Details    Type of service:  Video Visit   Video Start Time: 8:50 AM  Video End Time:9:10 AM    Originating Location (pt. Location): Home    Distant Location (provider location):  On-site  Platform used for Video Visit: [facility]  Psychiatry Clinic  MEDICAL PROGRESS NOTE     CARE TEAM:  PCP- Aiden Resendez    Psychotherapist- None       This person is a 57 year old who uses the name Jamison and pronouns he, him, his.      DIAGNOSIS     Major depressive disorder, recurrent, moderate  Generalized anxiety disorder, with panic  PTSD     Medical Diagnoses:  Chronic pain  Rheumatoid arthritis  Ankylosing spondylitis (HLA-B27 positive)  Hypertension  Hperlipidemia     ASSESSMENT     Mr. Breen reports low mood, anhedonia, and sedation. He noted that he has been having difficulty getting things done and caring for himself. Pain is a significant contributor to his symptoms. He has an appointment with the pain clinic on 5/17. His tumultuous and hurtful relationship with his mother has been causing him considerable emotional distress. He shared that it is hard to accept that his mother does not want to see him or talk to him. He says he thinks about this every day. We discussed the importance of starting weekly psychotherapy. He continues to be interested in therapy but has not contacted [facility] regarding his earlier referral. He agreed to follow up today.     Reports recurring dreams and nightmares that affect sleep. He self-discontinued prazosin to 2 mg due to sedation a few months ago. Felt that he did not need it anymore. Today, he agreed to restart prazosin 1 mg at bedtime. A sleep medicine referral was made previously; the contact number was shared with him several times to follow up on this referral.     He also shared his distress about not having a \"relationship with his wife\" for a year. He understands that his antidepressant could " contribute to this issue due to potential sexual side effects, and does not want to make any change on the his antidepressant.      Denies any change in substance use.     Denies suicidal ideation, intent, or plan today; denies any safety concerns.    He had a PCP visit last fall, received preventive counseling, and the patient is aware of the weight management referral. Monitoring weight changes closely.     Denies suicidal ideation, any intent, or plan. No reports of medication side effects.     If depression worsens, we consider titrating from fluoxetine to an SNRI and switching from quetiapine to aripiprazole.     The patient understands the risks, benefits, adverse effects, and alternatives. Agrees to treatment with the capacity to do so. No medical contraindications to treatment. Agrees to call the clinic for any problems. The patient understands to call 911 or come to the nearest ED if life-threatening or urgent symptoms present.      MNPMP review was not needed today.     PLAN                                                                                                                1) Medications:   - Continue fluoxetine 80 mg daily  - Decrease quetiapine 50 mg at bedtime to 25 mg at bedtime (due to sedation)  - Discontinue quetiapine 25-50 mg as needed for anxiety and panic (was rarely taking)  - Continue liothyronine 25 mcg daily  - Start prazosin 1 mg for PTSD/sleep/nightmares    Other:  - Suboxone  - Methotrexate  - Oxycodone (states not taking)     2) Therapy- Recommended   Patient is interested; psychotherapy; he agreed to follow up the previous referral order.    3) Next Due   Labs - AP labs last done on 12/2022  EKG - last EKG 12/8/2022.  Rating scales- AIMS     4) Referral -   - Patient will follow up sleep medicine and psychotherapy referrals   - MTM referral   -  referral (to help patient to schedule appointments and identify the barrier preventing him from making the phone calls.)    5)  "Other   - RN check-in next week    6) RTC: 4-6 weeks     7) Crisis Numbers:   Provided routinely in AVS   After hours:  [phone]     PERTINENT BACKGROUND                                                    [most recent eval 07/13/22]     Originally, diagnosed with depression and anxiety in 2015 but first experienced symptoms of depression in middle school. He most recently had a significant worsening of symptoms in 2017 after he was diagnosed with rheumatoid arthritis, ankylosing spondylitis ended up with a knee injury and was no longer able to work or run which he used as his primary coping mechanism.    Pertinent Items Include: suicidal ideation, SIB [cutting], multiple psychotropic trials , severe med reaction, psych hosp (<3), SUBSTANCE USE: alcohol, substance use treatment  and Major Medical Problems (Rheumatoid Arthritis and Ankylosing Spondylitis)     SUBJECTIVE      - \"Things are hard; even caring for myself is becoming difficult.\"  - He has been thinking about ordering a power wash to clean up the deck   - Pain is debilitating, recently was prescribed oxycodone for acute pain and used it for a few days, but not taking it any longer   - Appointment coming up with the pain clinic   - Has not scheduled sleep medicine appointment  - Shared that he has not talked with his  for a while and still did not follow up with [facility].   - \"Not feeling good about myself, thinking about mom.\"  - Sometimes, he feels like \"he does not have anything left to live for.\"  - He has not had a \"relationship with his wife\" for a year. This is bothering him. Understands the sexual side effects of antidepressants.   - Appetite is ok, falling asleep is difficult, having nightmares, they come and go  - Mom still does not want to talk with him. \"It is hurtful.\"  - Denies any change in substance use.   - PCP is following up on metabolic issues  - Denies suicidal ideation, intent, or plan today; denies any safety " "concerns. Indicated that he must misunderstand the PHQ-9 questions.    Recent Psych Symptoms:   Depression:  depressed mood, anhedonia, low energy, hypersomnia, poor concentration /memory and feeling worthless  Elevated:  none  Psychosis:  none  Anxiety:  improved, no panic attacks since the last visit  Trauma Related:  nightmares, flashbacks, negative beliefs / emotions and hypervigilance, nightmares about child trauma,  Sleep: \"Okay\"    Adverse Effects:  denies  Pertinent Negative Symptoms: No suicidal ideation, violent ideation, aggression, psychosis, hallucinations, delusions, otto and hypomania    Recent Substance Use:     Alcohol- none since , used to attend AA meetings prior to COVID, no plans to start  Tobacco- denies  Caffeine- no,    Cannabis- denies     Opioids- as prescribed           Narcan Kit- prescribed   Other illicit drugs- none     FAMILY and SOCIAL HISTORY                                 pt reported     Family Hx:  Mom - depression (since  when sister )     Social Hx:  Financial/ Work- planning to attend [employer] for a degree in psychology; unable to work due to diagnosis of rheumatoid arthritis and ankylosing spondylitis, on disability   Partner/ -  in   Children- 26 and 28 year old  Living situation- [place], house with wife and daughter 27 yo at home, son is in [place], three dogs    Social/ Spiritual Support- Talks to  every two weeks, not many friends, family is supportive, likes walking and hiking     PAST PSYCHIATRIC HISTORY     SIB- Hx of cutting/carving of skin   Suicide Attempt [#, most recent]- No   Suicidal Ideation Hx- No   Violence/Aggression Hx- No, not now  Psychosis Hx- No   Eating Disorder Hx- No   Psych Hosp [#, most recent]- Yes, 3-4 times, 10 days longest  Commitment- No   TMS/ECT- No   Outpatient Programs - Yes, CD treatment at [facility], in patient and outpatient program     PAST MED TRIALS       Medication  Dose   (mg) Effect  " Dates of Use   Fluoxetine 40-80 Felt like was initially helpful 2016 - 2017 2022-2023   Sertraline 100 Effective initially, eventually self discontinued as not helpful 2018 - 7/2020   Duloxetine 30 BID Used to treat nerve pain; felt weird on it 2017   Bupropion  BID ineffective 2017 - 2019   Nortriptyline 50 For pain 2017 -  3/2019   Mirtazapine   Dissociated for entire first week after taking 2012   Trazodone 50   2013             Hydroxyzine 25 QID prn For anxiety and panic attacks; not effective for severe anxiety 2015 - 2017   Buspirone 15 TID   2016 - 2017   Gabapentin 100 TID   2013 - 2016   Alprazolam 0.5 TID For anxiety 9837-2782   Diazepam 2 BID For anxiety 2016   Lorazepam 1 TID For anxiety 2016   Quetiapine  25-50  9934-4661   Clonazepam 1 For anxiety 2016        PSYCH CRITICAL SUMMARY POINTS         [From Dr. Sage's Notes on 6/8/2022:  9/20 - started escitalopram  11/20 - increased escitalopram 20mg daily  2/21 - stopped hydroxyzine; started trazodone 50mg at bedtime and 25mg daily as needed for anxiety  3/21 - 4/22 - hospitalized started on Abilify, titrated to 5mg daily; escitalopram switched to fluoxetine 40mg daily; started prazosin 1mg at bedtime. Pain management switched oxycodone to Suboxone and started medical marijuana.  5/21  - patient still taking hydroxyzine, discontinued today.  6/21  - patient hospitalized (6/15/21) discontinued Abilify and trazodone, started on quetiapine & titrated to 100mg at bedtime and 25mg as needed for sleep.   8/21 - increase quetiapine 150mg   11/21  - discontinued quetiapine 150mg (patient ran out of medications and did not take for several weeks).  12/21 - patient restarted quetiapine 50mg at bedtime   3/22 - increased fluoxetine 60mg]    7/14/2022: Transfer visit.  Changed prazosin from PRN to scheduled at bedtime  11/3/2022: No medication changes. Elevated BP history discussed. Patient agreed to make a PCP appointment.   12/1/2022: No medication  changes.   1/5/2023:  Increased prazosin to 2 mg at bedtime  2/9/2023:  Return of depressive symptoms after her daughter moved out from the family home. Increased fluoxetine from 60 mg daily to 80 mg daily.  3/2/2023:  Patient self-discontinued prazosin 2 mg due sedation.  No other changes to medications. Depression improved.   3/30/2023: Doing well. No medication changes.   5/11/2023: Reported sleep issues, nightmares, daytime sedation. Decreased quetiapine 50 mg at bedtime to 25 mg at bedtime (due to sedation)  Discontinued quetiapine 25-50 mg as needed for anxiety and panic (was rarely taking). Started prazosin 1 mg for sleep/nightmares.       MEDICAL HISTORY and ALLERGY     ALLERGIES: Mirtazapine, Hydrocodone, Mirtazapine, and Ms contin [morphine]    Patient Active Problem List   Diagnosis     CARDIOVASCULAR SCREENING; LDL GOAL LESS THAN 160     Obesity, Class I, BMI 30-34.9     Tear meniscus knee, right, initial encounter     Rheumatoid arthritis involving multiple sites, unspecified rheumatoid factor presence     Chronic pain     Right-sided thoracic back pain, unspecified chronicity     Generalized anxiety disorder     Panic attack     Ankylosing spondylitis (H)     Moderate major depression (H)     Immunocompromised (H)     Essential hypertension with goal blood pressure less than 140/90     Suicidal ideation     Insomnia due to other mental disorder     Major depressive disorder, recurrent episode, severe with mixed features (H)     Chronic back pain greater than 3 months duration     Contact dermatitis and eczema     Dermatitis     Drug dependence (H)     Encounter for screening colonoscopy     Esophageal reflux     Hematuria     Hyperlipidemia     Lumbar radiculopathy     Plantar fascial fibromatosis     Sprain of sacroiliac region     Overweight     Nonintractable migraine     Narcotic abuse, continuous (H)     Morbid obesity (H)        MEDICAL REVIEW OF SYSTEMS     Contraception- none    A comprehensive  review of systems was performed and is negative other than noted in the HPI.      MEDICATIONS     Current Outpatient Medications   Medication Sig Dispense Refill     atorvastatin (LIPITOR) 20 MG tablet Take 1 tablet (20 mg) by mouth daily 90 tablet 1     buprenorphine (SUBUTEX) 8 MG SUBL sublingual tablet Place 0.5-1 tablets (4-8 mg) under the tongue 4 times daily Max 2 tabs per day. Fill 05/02/23 to start 05/04/23 60 tablet 0     etanercept (ENBREL SURECLICK) 50 MG/ML autoinjector Inject 50 mg Subcutaneous once a week 1 mL 3     FLUoxetine (PROZAC) 40 MG capsule Take 2 capsules (80 mg) by mouth daily 60 capsule 1     folic acid (FOLVITE) 1 MG tablet Take 3 tablets (3 mg) by mouth daily 270 tablet 2     liothyronine (CYTOMEL) 25 MCG tablet Take 1 tablet (25 mcg) by mouth daily 90 tablet 0     medical cannabis (Patient's own supply) See Admin Instructions (The purpose of this order is to document that the patient reports taking medical cannabis.  This is not a prescription, and is not used to certify that the patient has a qualifying medical condition.)     Pills PRN   Lozenges PRN       methotrexate 2.5 MG tablet Take 6 tablets (15 mg) by mouth every 7 days 72 tablet 0     oxyCODONE (ROXICODONE) 5 MG tablet Take 1 tablet (5 mg) by mouth every 6 hours as needed for severe pain Max of 3/day. Fill/begin 05/03/23. Short term script for acute pain flare. 42 tablet 0     QUEtiapine (SEROQUEL) 50 MG tablet Take 1 tablet (50 mg) by mouth At Bedtime May take additional 25-50mg (1/2 - 1 tablet) daily prn for anxiety/panic attacks 45 tablet 1     vitamin D2 (ERGOCALCIFEROL) 83725 units (1250 mcg) capsule TAKE 1 CAPSULE BY MOUTH EVERY MONDAY AND THURSDAY        VITALS   There were no vitals taken for this visit.     Pulse Readings from Last 5 Encounters:   12/08/22 89   11/23/22 72   09/22/22 66   08/31/22 65   05/02/22 72     Wt Readings from Last 5 Encounters:   12/08/22 131.5 kg (290 lb)   05/02/22 122.7 kg (270 lb 8 oz)    03/16/22 125.3 kg (276 lb 3.2 oz)   02/16/22 122.5 kg (270 lb)   12/30/21 123 kg (271 lb 2 oz)     BP Readings from Last 5 Encounters:   12/08/22 133/85   11/23/22 138/85   09/22/22 (!) 176/88   08/31/22 (!) 154/95   05/02/22 (!) 163/90        MENTAL STATUS EXAM     Alertness: alert  and oriented  Appearance: casually groomed, limited obs by video visit  Behavior/Demeanor: cooperative, pleasant and calm, with poor eye contact   Speech: normal and regular rate and rhythm  Language: intact and no problems  Psychomotor: normal or unremarkable and based on video visit, no abnormal moveemnts or tics were observed  Mood: depressed   Affect: guarded; congruent to: mood- yes, content- yes  Thought Process/Associations: unremarkable  Thought Content:  Reports none;  Denies suicidal & violent ideation and delusions, denies any plan or intent  Perception:  Reports none;  Denies auditory hallucinations and visual hallucinations  Insight: good  Judgment: fair  Cognition: does  appear grossly intact; formal cognitive testing was not done  Gait and Station: N/A ([facility])     LABS and DATA         2/9/2023     9:11 AM 3/30/2023     8:40 AM 5/11/2023     8:41 AM   PHQ   PHQ-9 Total Score 17 11 12   Q9: Thoughts of better off dead/self-harm past 2 weeks Several days Not at all More than half the days   F/U: Thoughts of suicide or self-harm No  No   F/U: Safety concerns No  No       Recent Labs   Lab Test 12/08/22  1425 08/31/22  1405   CR 0.98 0.94   GFRESTIMATED >90 >90     Recent Labs   Lab Test 12/08/22  1425 08/31/22  1405 10/11/21  1123   AST 32 35 37   ALT 49 46 47   ALKPHOS 112  --  102       Recent Labs   Lab Test 12/08/22  1427 12/08/22  1425 10/11/21  1123   GLC  --  96 97   A1C 5.5  --  5.2     Recent Labs   Lab Test 12/08/22  1425 10/11/21  1123   CHOL 224* 209*   TRIG 230* 225*   * 134*   HDL 25* 30*     Recent Labs   Lab Test 12/08/22  1425 08/31/22  1405 06/16/21  0741 03/21/21  0808 03/17/21  0748   WBC  7.7 6.0   < > 6.8 8.3   ANEU  --   --   --  3.7 4.4   HGB 15.6 15.1   < > 15.6 15.0    213   < > 246 211    < > = values in this interval not displayed.     TSH   Date Value Ref Range Status   12/08/2022 1.19 0.40 - 4.00 mU/L Final   05/02/2022 1.46 0.40 - 4.00 mU/L Final   10/11/2021 1.37 0.40 - 4.00 mU/L Final   06/16/2021 0.65 0.40 - 4.00 mU/L Final   03/21/2021 2.39 0.40 - 4.00 mU/L Final   03/17/2021 2.01 0.40 - 4.00 mU/L Final   09/30/2016 1.62 0.40 - 4.00 mU/L Final     ECG 6/15/2021 QTc = 413 ms    EKG on 12/8/2022 QTc 403 ms  Sinus  Rhythm   -Incomplete right bundle branch block.    -Left atrial enlargement.      PSYCHOTROPIC DRUG INTERACTIONS                                                       PSYCHCLINICDDI     Increased risk of QTc prolongation: fluoxetine, quetiapine, Suboxone  Increased risk of serotonin syndrome: fluoxetine, Suboxone  Increased risk of CNS/respiratory depression: Suboxone, quetiapine    Prazosin and buprenorphine may have additive effects in lowering your blood pressure.    MANAGEMENT:  Monitoring for adverse effects and patient is aware of risks     RISK STATEMENT for SAFETY     Mr. Breen did not appear to be an imminent safety risk to self or others.     TREATMENT RISK STATEMENT: The risks, benefits, alternatives and potential adverse effects have been discussed and are understood by the pt. The pt understands the risks of using street drugs or alcohol. There are no medical contraindications, the pt agrees to treatment with the ability to do so. The pt knows to call the clinic for any problems or to access emergency care if needed.  Medical and substance use concerns are documented above.  Psychotropic drug interaction check was done, including changes made today.     PROVIDER: Katie Corley MD, PhD    Patient staffed in clinic with Dr. Solo who will sign the note.  Supervisor is Dr. Hunter.

## 2023-05-09 NOTE — TELEPHONE ENCOUNTER
PA Initiation    Medication: Oxycodone 5mg PA INITIATED  Insurance Company: [employer] Part D - Phone [phone] Fax [phone]  Pharmacy Filling the Rx: [id number] - [place], [place] - [address] AT [facility]  Filling Pharmacy Phone: [phone]  Filling Pharmacy Fax:    Start Date: 5/9/2023    Central Prior Authorization Team   Phone: [phone]

## 2023-05-10 NOTE — TELEPHONE ENCOUNTER
Prior Authorization Approval    Authorization Effective Date: 5/9/2023  Authorization Expiration Date: 6/8/2023  Medication: Oxycodone 5mg PA APPROVED  Approved Dose/Quantity: #42/14 DS  Reference #:     Insurance Company: [employer] Part D - Phone [phone] Fax [phone]  Expected CoPay:       CoPay Card Available:      Foundation Assistance Needed:    Which Pharmacy is filling the prescription (Not needed for infusion/clinic administered): [id number] - [place], [place] - [address] AT [address]  Pharmacy Notified: Yes  Patient Notified: Comment:  Pharmacy will notify patient.

## 2023-05-11 ENCOUNTER — TELEPHONE (OUTPATIENT)
Dept: PSYCHIATRY | Facility: CLINIC | Age: 58
End: 2023-05-11
Payer: COMMERCIAL

## 2023-05-11 ENCOUNTER — VIRTUAL VISIT (OUTPATIENT)
Dept: PSYCHIATRY | Facility: CLINIC | Age: 58
End: 2023-05-11
Attending: PSYCHIATRY & NEUROLOGY
Payer: COMMERCIAL

## 2023-05-11 DIAGNOSIS — F41.1 GENERALIZED ANXIETY DISORDER WITH PANIC ATTACKS: ICD-10-CM

## 2023-05-11 DIAGNOSIS — F41.0 GENERALIZED ANXIETY DISORDER WITH PANIC ATTACKS: ICD-10-CM

## 2023-05-11 DIAGNOSIS — F43.10 PTSD (POST-TRAUMATIC STRESS DISORDER): ICD-10-CM

## 2023-05-11 DIAGNOSIS — F33.1 MAJOR DEPRESSIVE DISORDER, RECURRENT EPISODE, MODERATE (H): Primary | ICD-10-CM

## 2023-05-11 PROCEDURE — 99214 OFFICE O/P EST MOD 30 MIN: CPT | Mod: VID | Performed by: STUDENT IN AN ORGANIZED HEALTH CARE EDUCATION/TRAINING PROGRAM

## 2023-05-11 PROCEDURE — G0463 HOSPITAL OUTPT CLINIC VISIT: HCPCS | Mod: PN,GT | Performed by: STUDENT IN AN ORGANIZED HEALTH CARE EDUCATION/TRAINING PROGRAM

## 2023-05-11 RX ORDER — PRAZOSIN HYDROCHLORIDE 1 MG/1
1 CAPSULE ORAL AT BEDTIME
Qty: 30 CAPSULE | Refills: 1 | Status: SHIPPED | OUTPATIENT
Start: 2023-05-11 | End: 2023-05-31

## 2023-05-11 RX ORDER — QUETIAPINE FUMARATE 25 MG/1
25 TABLET, FILM COATED ORAL AT BEDTIME
Qty: 30 TABLET | Refills: 1 | Status: SHIPPED | OUTPATIENT
Start: 2023-05-11 | End: 2023-05-31

## 2023-05-11 RX ORDER — FLUOXETINE 40 MG/1
80 CAPSULE ORAL DAILY
Qty: 60 CAPSULE | Refills: 1 | Status: SHIPPED | OUTPATIENT
Start: 2023-05-11 | End: 2023-05-31

## 2023-05-11 ASSESSMENT — PATIENT HEALTH QUESTIONNAIRE - PHQ9
10. IF YOU CHECKED OFF ANY PROBLEMS, HOW DIFFICULT HAVE THESE PROBLEMS MADE IT FOR YOU TO DO YOUR WORK, TAKE CARE OF THINGS AT HOME, OR GET ALONG WITH OTHER PEOPLE: VERY DIFFICULT
SUM OF ALL RESPONSES TO PHQ QUESTIONS 1-9: 12
SUM OF ALL RESPONSES TO PHQ QUESTIONS 1-9: 12

## 2023-05-11 NOTE — PATIENT INSTRUCTIONS
**For crisis resources, please see the information at the end of this document**   Patient Education    Thank you for coming to the [facility] [place] [facility].     Lab Testing:  If you had lab testing today and your results are reassuring or normal they will be mailed to you or sent through [employer] within 7 days. If the lab tests need quick action we will call you with the results. The phone number we will call with results is # [phone]. If this is not the best number please call our clinic and change the number.     Medication Refills:  If you need any refills please call your pharmacy and they will contact us. Our fax number for refills is [phone].   Three business days of notice are needed for general medication refill requests.   Five business days of notice are needed for controlled substance refill requests.   If you need to change to a different pharmacy, please contact the new pharmacy directly. The new pharmacy will help you get your medications transferred.     Contact Us:  Please call [phone] during business hours (8-5:00 M-F).   If you have medication related questions after clinic hours, or on the weekend, please call [phone].     Financial Assistance [phone]   Medical Records [phone]       MENTAL HEALTH CRISIS RESOURCES:  For a emergency help, please call 911 or go to the nearest Emergency Department.     Emergency Walk-In Options:   EmPATH Unit @ [facility] ([place]): [phone] - Specialized mental health emergency area designed to be calming  [facility] ([place]): [phone]  [facility] ([place]): [phone]  [facility]): [phone]    [facility] Crisis Information:   [place]: [phone]  [place]: [phone]  [place] ([place]) - Adult: [phone]     Child: [phone]  [place] - Adult: [phone]     Child: [phone]  [place]:  [phone]  List of all [facility] resources:   https://[place].gov/dhs/people-we-serve/adults/health-care/mental-health/resources/crisis-contacts.jsp    National Crisis Information:   Crisis Text Line: Text  [place]  to 765655  Suicide & Crisis Lifeline: 988  National Suicide Prevention Lifeline: 8-365-689-TALK (1-117.815.8197)       For online chat options, visit https://suicidepreventionlifeline.org/chat/  Poison Control Center: 4-096-387-1188  Trans Lifeline: 8-119-745-5829 - Hotline for transgender people of all ages  The Tu Project: 7-938-912-4641 - Hotline for LGBT youth     For Non-Emergency Support:   Fast Tracker: Mental Health & Substance Use Disorder Resources -   https://www.Sibaritusck500Shopsn.org/

## 2023-05-11 NOTE — TELEPHONE ENCOUNTER
Dr. Corley,    Received fax from pharmacy requesting 90 days  Supply for the Prazosin 1 mg .    Approve or Deny 90 days ? If approve , send it back to me and I will pend it  For 90 days .    Shila Solomon on 5/11/2023 at 11:12 AM

## 2023-05-11 NOTE — NURSING NOTE
Is the patient currently in the state of [place]? YES    Visit mode:VIDEO    If the visit is dropped, the patient can be reconnected by: VIDEO VISIT: Text to cell phone: [phone]    Will anyone else be joining the visit? NO      How would you like to obtain your AVS? MyChart    Are changes needed to the allergy or medication list? NO   Patient declined individual  medication review by support staff because he  states that nothing has changed .    Will do QNRS/PHQ on own through mychart    Reason for visit: Video Visit      Maia Upton VF

## 2023-05-12 NOTE — TELEPHONE ENCOUNTER
Writer called pharmacy and informed [facility] that we are denied  90 days supply per Dr. Corley .    Shila Solomon on 5/12/2023 at 10:20 AM

## 2023-05-15 ENCOUNTER — VIRTUAL VISIT (OUTPATIENT)
Dept: PHARMACY | Facility: CLINIC | Age: 58
End: 2023-05-15
Payer: COMMERCIAL

## 2023-05-15 ENCOUNTER — PATIENT OUTREACH (OUTPATIENT)
Dept: CARE COORDINATION | Facility: CLINIC | Age: 58
End: 2023-05-15
Payer: COMMERCIAL

## 2023-05-15 DIAGNOSIS — F33.1 MODERATE EPISODE OF RECURRENT MAJOR DEPRESSIVE DISORDER (H): Primary | ICD-10-CM

## 2023-05-15 DIAGNOSIS — G89.4 CHRONIC PAIN SYNDROME: ICD-10-CM

## 2023-05-15 DIAGNOSIS — E78.5 HYPERLIPIDEMIA, UNSPECIFIED HYPERLIPIDEMIA TYPE: ICD-10-CM

## 2023-05-15 DIAGNOSIS — M06.9 RHEUMATOID ARTHRITIS INVOLVING MULTIPLE SITES, UNSPECIFIED WHETHER RHEUMATOID FACTOR PRESENT (H): ICD-10-CM

## 2023-05-15 PROCEDURE — 99207 PR NO CHARGE LOS: CPT | Mod: VID | Performed by: PHARMACIST
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
## 2023-05-15 NOTE — PATIENT INSTRUCTIONS
"Recommendations from today's MTM visit:                                                    MTM (medication therapy management) is a service provided by a clinical pharmacist designed to help you get the most of out of your medicines.   Today we reviewed what your medicines are for, how to know if they are working, that your medicines are safe and how to make your medicine regimen as easy as possible.      1.Discuss pain and RA management with providers at visits this week. Increasing buprenorphine may be more helpful than adding oxycodone.    2. Start creating a bedtime routine and eliminate screen use once you are in bed. Take prazosin and quetiapine as you start getting ready for bed    Follow-up: 1-2 months    It was great speaking with you today.  I value your experience and would be very thankful for your time in providing feedback in our clinic survey. In the next few days, you may receive an email or text message from [email or web address] with a link to a survey related to your  clinical pharmacist.\"     To schedule another MTM appointment, please call the clinic directly or you may call the MTM scheduling line at [phone] or toll-free at [phone].     My Clinical Pharmacist's contact information:                                                      Please feel free to contact me with any questions or concerns you have.      Caitlin Cohen, PharmD  Medication Therapy Management Pharmacist  [facility]  "

## 2023-05-15 NOTE — PROGRESS NOTES
Clinic Care Coordination Contact    [facility] contacted patient to follow up on referral placed by Dr. Corley. Patient states he struggles to find the energy to call and schedule appointments. Patient states, however, that he feels capable of doing this on his own. Patient states he plans to call at 1500 today to schedule his Psychotherapy appointment. Patient denies any assistance from me at this time.    Care Coordinator will follow up with patient in one week.    TAWANNA Ortiz  Clinic Care Coordination  [facility]  [email or web address]  [phone]

## 2023-05-16 ENCOUNTER — VIRTUAL VISIT (OUTPATIENT)
Dept: RHEUMATOLOGY | Facility: CLINIC | Age: 58
End: 2023-05-16
Payer: COMMERCIAL

## 2023-05-16 ENCOUNTER — TELEPHONE (OUTPATIENT)
Dept: RHEUMATOLOGY | Facility: CLINIC | Age: 58
End: 2023-05-16

## 2023-05-16 DIAGNOSIS — F32.A DEPRESSION, UNSPECIFIED DEPRESSION TYPE: Primary | ICD-10-CM

## 2023-05-16 DIAGNOSIS — Z11.59 ENCOUNTER FOR SCREENING FOR OTHER VIRAL DISEASES: ICD-10-CM

## 2023-05-16 DIAGNOSIS — M06.9 RHEUMATOID ARTHRITIS INVOLVING MULTIPLE SITES, UNSPECIFIED WHETHER RHEUMATOID FACTOR PRESENT (H): Primary | ICD-10-CM

## 2023-05-16 DIAGNOSIS — M06.9 RHEUMATOID ARTHRITIS INVOLVING MULTIPLE SITES, UNSPECIFIED WHETHER RHEUMATOID FACTOR PRESENT (H): ICD-10-CM

## 2023-05-16 PROCEDURE — 99214 OFFICE O/P EST MOD 30 MIN: CPT | Mod: VID | Performed by: STUDENT IN AN ORGANIZED HEALTH CARE EDUCATION/TRAINING PROGRAM

## 2023-05-16 RX ORDER — CERTOLIZUMAB PEGOL 200 MG/ML
INJECTION, SOLUTION SUBCUTANEOUS
Qty: 1 KIT | Refills: 0 | OUTPATIENT
Start: 2023-05-16 | End: 2023-05-17

## 2023-05-16 NOTE — TELEPHONE ENCOUNTER
PA Initiation    Medication: cimzia  Insurance Company: [employer] [facility] - Phone [phone] Fax [phone]  Pharmacy Filling the Rx:    Filling Pharmacy Phone:    Filling Pharmacy Fax:    Start Date: 5/16/2023    Prior Authorization Approval    Authorization Effective Date: 5/16/2023  Authorization Expiration Date: 11/16/2023  Medication: cimzia  Approved Dose/Quantity: starter kit and maintenance   Reference #: [id number]   Insurance Company: [employer] [facility] - Phone [phone] Fax [phone]  Expected CoPay:     $1805 for starter kit     $3000 for maintenance   CoPay Card Available:      Foundation Assistance Needed:    Which Pharmacy is filling the prescription (Not needed for infusion/clinic administered):    Pharmacy Notified: No  Patient Notified: Yes        Writer spoke to patient regarding cost. The  does not have a free drug program anymore. They are requiring patients to apply to [facility] with medicare. Or we can look at alternative therapies.    TONIE PressleyS, [facility]  Specialty Pharmacy Clinic Liaison     [facility]    [email or web address]     Phone: [phone]  Fax: [phone]

## 2023-05-16 NOTE — TELEPHONE ENCOUNTER
Are there any free drug programs for other Biologics like Simponi, abatacept, Actemra etc ?  Will infusions be covered rather than subcu injection?

## 2023-05-16 NOTE — PATIENT INSTRUCTIONS
Methotrexate monitoring labs to be done.  If normal then increase methotrexate to 7 tablets and then following week to 8 tablets once a week dose.    We will discontinue Enbrel and start Cimzia.    Start with Cimzia loading dose and then Cimzia 400 mg subcu injection monthly    Behavioral health services referral given for psychotherapy    Follow-up August 18th.

## 2023-05-17 ENCOUNTER — TELEPHONE (OUTPATIENT)
Dept: BEHAVIORAL HEALTH | Facility: CLINIC | Age: 58
End: 2023-05-17

## 2023-05-17 ENCOUNTER — OFFICE VISIT (OUTPATIENT)
Dept: PALLIATIVE MEDICINE | Facility: CLINIC | Age: 58
End: 2023-05-17
Payer: COMMERCIAL

## 2023-05-17 ENCOUNTER — TELEPHONE (OUTPATIENT)
Dept: RHEUMATOLOGY | Facility: CLINIC | Age: 58
End: 2023-05-17

## 2023-05-17 ENCOUNTER — LAB (OUTPATIENT)
Dept: LAB | Facility: CLINIC | Age: 58
End: 2023-05-17
Payer: COMMERCIAL

## 2023-05-17 VITALS — HEART RATE: 69 BPM | DIASTOLIC BLOOD PRESSURE: 92 MMHG | SYSTOLIC BLOOD PRESSURE: 149 MMHG

## 2023-05-17 DIAGNOSIS — F11.90 CHRONIC, CONTINUOUS USE OF OPIOIDS: ICD-10-CM

## 2023-05-17 DIAGNOSIS — M47.817 SPONDYLOSIS WITHOUT MYELOPATHY OR RADICULOPATHY, LUMBOSACRAL REGION: ICD-10-CM

## 2023-05-17 DIAGNOSIS — M05.79 RHEUMATOID ARTHRITIS INVOLVING MULTIPLE SITES WITH POSITIVE RHEUMATOID FACTOR (H): ICD-10-CM

## 2023-05-17 DIAGNOSIS — M25.50 MULTIPLE JOINT PAIN: ICD-10-CM

## 2023-05-17 DIAGNOSIS — Z79.891 ENCOUNTER FOR LONG-TERM USE OF OPIATE ANALGESIC: ICD-10-CM

## 2023-05-17 DIAGNOSIS — M62.838 MUSCLE SPASM: ICD-10-CM

## 2023-05-17 DIAGNOSIS — M25.551 HIP PAIN, RIGHT: ICD-10-CM

## 2023-05-17 DIAGNOSIS — M51.369 DDD (DEGENERATIVE DISC DISEASE), LUMBAR: Primary | ICD-10-CM

## 2023-05-17 DIAGNOSIS — M06.9 RHEUMATOID ARTHRITIS INVOLVING MULTIPLE SITES, UNSPECIFIED WHETHER RHEUMATOID FACTOR PRESENT (H): ICD-10-CM

## 2023-05-17 DIAGNOSIS — Z11.59 ENCOUNTER FOR SCREENING FOR OTHER VIRAL DISEASES: ICD-10-CM

## 2023-05-17 DIAGNOSIS — M54.59 LUMBAR FACET JOINT PAIN: ICD-10-CM

## 2023-05-17 DIAGNOSIS — F11.20 UNCOMPLICATED OPIOID DEPENDENCE (H): ICD-10-CM

## 2023-05-17 DIAGNOSIS — M45.7 ANKYLOSING SPONDYLITIS OF LUMBOSACRAL REGION (H): ICD-10-CM

## 2023-05-17 LAB
ALBUMIN SERPL-MCNC: 3.7 G/DL (ref 3.4–5)
ALT SERPL W P-5'-P-CCNC: 48 U/L (ref 0–70)
AST SERPL W P-5'-P-CCNC: 30 U/L (ref 0–45)
CREAT SERPL-MCNC: 0.84 MG/DL (ref 0.66–1.25)
CREAT UR-MCNC: 204 MG/DL
CRP SERPL-MCNC: <2.9 MG/L (ref 0–8)
ERYTHROCYTE [DISTWIDTH] IN BLOOD BY AUTOMATED COUNT: 12.7 % (ref 10–15)
ERYTHROCYTE [SEDIMENTATION RATE] IN BLOOD BY WESTERGREN METHOD: 13 MM/HR (ref 0–20)
ETHANOL UR QL SCN: NORMAL
GFR SERPL CREATININE-BSD FRML MDRD: >90 ML/MIN/1.73M2
HBV CORE AB SERPL QL IA: NONREACTIVE
HBV SURFACE AG SERPL QL IA: NONREACTIVE
HCT VFR BLD AUTO: 45.6 % (ref 40–53)
HCV AB SERPL QL IA: NONREACTIVE
HGB BLD-MCNC: 15.5 G/DL (ref 13.3–17.7)
MCH RBC QN AUTO: 29.6 PG (ref 26.5–33)
MCHC RBC AUTO-ENTMCNC: 34 G/DL (ref 31.5–36.5)
MCV RBC AUTO: 87 FL (ref 78–100)
PLATELET # BLD AUTO: 228 10E3/UL (ref 150–450)
RBC # BLD AUTO: 5.23 10E6/UL (ref 4.4–5.9)
WBC # BLD AUTO: 6.4 10E3/UL (ref 4–11)

## 2023-05-17 PROCEDURE — 80357 KETAMINE AND NORKETAMINE: CPT

## 2023-05-17 PROCEDURE — 84450 TRANSFERASE (AST) (SGOT): CPT

## 2023-05-17 PROCEDURE — 86481 TB AG RESPONSE T-CELL SUSP: CPT

## 2023-05-17 PROCEDURE — 86140 C-REACTIVE PROTEIN: CPT

## 2023-05-17 PROCEDURE — 83992 ASSAY FOR PHENCYCLIDINE: CPT

## 2023-05-17 PROCEDURE — 80346 BENZODIAZEPINES1-12: CPT

## 2023-05-17 PROCEDURE — 80356 HEROIN METABOLITE: CPT

## 2023-05-17 PROCEDURE — 85652 RBC SED RATE AUTOMATED: CPT

## 2023-05-17 PROCEDURE — 80355 GABAPENTIN NON-BLOOD: CPT

## 2023-05-17 PROCEDURE — 84460 ALANINE AMINO (ALT) (SGPT): CPT

## 2023-05-17 PROCEDURE — 87340 HEPATITIS B SURFACE AG IA: CPT

## 2023-05-17 PROCEDURE — 86803 HEPATITIS C AB TEST: CPT

## 2023-05-17 PROCEDURE — 80358 DRUG SCREENING METHADONE: CPT

## 2023-05-17 PROCEDURE — 80363 OPIOIDS & OPIATE ANALOGS 3/4: CPT

## 2023-05-17 PROCEDURE — 80354 DRUG SCREENING FENTANYL: CPT

## 2023-05-17 PROCEDURE — 80365 DRUG SCREENING OXYCODONE: CPT

## 2023-05-17 PROCEDURE — 80348 DRUG SCREENING BUPRENORPHINE: CPT

## 2023-05-17 PROCEDURE — 80360 METHYLPHENIDATE: CPT

## 2023-05-17 PROCEDURE — 99214 OFFICE O/P EST MOD 30 MIN: CPT | Performed by: NURSE PRACTITIONER

## 2023-05-17 PROCEDURE — 82565 ASSAY OF CREATININE: CPT

## 2023-05-17 PROCEDURE — 80324 DRUG SCREEN AMPHETAMINES 1/2: CPT

## 2023-05-17 PROCEDURE — 80372 DRUG SCREENING TAPENTADOL: CPT

## 2023-05-17 PROCEDURE — 80366 DRUG SCREENING PREGABALIN: CPT

## 2023-05-17 PROCEDURE — 86704 HEP B CORE ANTIBODY TOTAL: CPT

## 2023-05-17 PROCEDURE — 82040 ASSAY OF SERUM ALBUMIN: CPT

## 2023-05-17 PROCEDURE — 80320 DRUG SCREEN QUANTALCOHOLS: CPT

## 2023-05-17 PROCEDURE — 80367 DRUG SCREENING PROPOXYPHENE: CPT

## 2023-05-17 PROCEDURE — 36415 COLL VENOUS BLD VENIPUNCTURE: CPT

## 2023-05-17 PROCEDURE — 80359 METHYLENEDIOXYAMPHETAMINES: CPT

## 2023-05-17 PROCEDURE — 80373 DRUG SCREENING TRAMADOL: CPT

## 2023-05-17 PROCEDURE — 80361 OPIATES 1 OR MORE: CPT

## 2023-05-17 PROCEDURE — 80353 DRUG SCREENING COCAINE: CPT

## 2023-05-17 PROCEDURE — 85027 COMPLETE CBC AUTOMATED: CPT

## 2023-05-17 RX ORDER — BUPRENORPHINE 8 MG/1
8 TABLET SUBLINGUAL 2 TIMES DAILY
Qty: 60 TABLET | Refills: 0 | Status: SHIPPED | OUTPATIENT
Start: 2023-05-17 | End: 2023-07-06

## 2023-05-17 RX ORDER — OXYCODONE HYDROCHLORIDE 5 MG/1
5 TABLET ORAL EVERY 6 HOURS PRN
Qty: 42 TABLET | Refills: 0 | Status: SHIPPED | OUTPATIENT
Start: 2023-05-17 | End: 2023-07-06

## 2023-05-17 ASSESSMENT — PAIN SCALES - GENERAL: PAINLEVEL: EXTREME PAIN (9)

## 2023-05-17 NOTE — RESULT ENCOUNTER NOTE
No evidence for recent alcohol ingestion while on opiate medication. This is as expected.   Susana GRANT, RN CNP, FNP  [facility]

## 2023-05-17 NOTE — LETTER
Opioid / Opioid Plus Controlled Substance Agreement    This is an agreement between you and your provider about the safe and appropriate use of controlled substance/opioids prescribed by your care team. Controlled substances are medicines that can cause physical and mental dependence (abuse).    There are strict laws about having and using these medicines. We here at [facility] are committing to working with you in your efforts to get better. To support you in this work, we ll help you schedule regular office appointments for medicine refills. If we must cancel or change your appointment for any reason, we ll make sure you have enough medicine to last until your next appointment.     As a Provider, I will:  Listen carefully to your concerns and treat you with respect.   Recommend a treatment plan that I believe is in your best interest. This plan may involve therapies other than opioid pain medication.   Talk with you often about the possible benefits, and the risk of harm of any medicine that we prescribe for you.   Provide a plan on how to taper (discontinue or go off) using this medicine if the decision is made to stop its use.    As a Patient, I understand that opioid(s):   Are a controlled substance prescribed by my care team to help me function or work and manage my condition(s).   Are strong medicines and can cause serious side effects such as:  Drowsiness, which can seriously affect my driving ability  A lower breathing rate, enough to cause death  Harm to my thinking ability   Depression   Abuse of and addiction to this medicine  Need to be taken exactly as prescribed. Combining opioids with certain medicines or chemicals (such as illegal drugs, sedatives, sleeping pills, and benzodiazepines) can be dangerous or even fatal. If I stop opioids suddenly, I may have severe withdrawal symptoms.  Do not work for all types of pain nor for all patients. If they re not helpful, I may be asked to stop  them.        The risks, benefits and side effects of these medicine(s) were explained to me. I agree that:  I will take part in other treatments as advised by my care team. This may be psychiatry or counseling, physical therapy, behavioral therapy, group treatment or a referral to a specialist.     I will keep all my appointments. I understand that this is part of the monitoring of opioids. My care team may require an office visit for EVERY opioid/controlled substance refill. If I miss appointments or don t follow instructions, my care team may stop my medicine.    I will take my medicines as prescribed. I will not change the dose or schedule unless my care team tells me to. There will be no refills if I run out early.     I may be asked to come to the clinic and complete a urine drug test or complete a pill count at any time. If I don t give a urine sample or participate in a pill count, the care team may stop my medicine.    I will only receive prescriptions from this clinic for chronic pain. If I am treated by another provider for acute pain issues, I will tell them that I am taking opioid pain medication for chronic pain and that I have a treatment agreement with this provider. I will inform my [facility] care team within one business day if I am given a prescription for any pain medication by another healthcare provider. My [facility] care team can contact other providers and pharmacists about my use of any medicines.    It is up to me to make sure that I don t run out of my medicines on weekends or holidays. If my care team is willing to refill my opioid prescription without a visit, I must request refills only during office hours. Refills may take up to 3 business days to process. I will use one pharmacy to fill all my opioid and other controlled substance prescriptions. I will notify the clinic about any changes to my insurance or medication availability.    I am responsible for my  prescriptions. If the medicine/prescription is lost, stolen or destroyed, it will not be replaced. I also agree not to share controlled substance medicines with anyone.    I am aware I should not use any illegal or recreational drugs. I agree not to drink alcohol unless my care team says I can.       If I enroll in the [place] Medical Cannabis program, I will tell my care team prior to my next refill.     I will tell my care team right away if I become pregnant, have a new medical problem treated outside of my regular clinic, or have a change in my medications.    I understand that this medicine can affect my thinking, judgment and reaction time. Alcohol and drugs affect the brain and body, which can affect the safety of my driving. Being under the influence of alcohol or drugs can affect my decision-making, behaviors, personal safety, and the safety of others. Driving while impaired (DWI) can occur if a person is driving, operating, or in physical control of a car, motorcycle, boat, snowmobile, ATV, motorbike, off-road vehicle, or any other motor vehicle ([place] Statute 169A.20). I understand the risk if I choose to drive or operate any vehicle or machinery.    I understand that if I do not follow any of the conditions above, my prescriptions or treatment may be stopped or changed.          Opioids  What You Need to Know    What are opioids?   Opioids are pain medicines that must be prescribed by a doctor. They are also known as narcotics.     Examples are:   morphine (MS Contin, Raven)  oxycodone (Oxycontin)  oxycodone and acetaminophen (Percocet)  hydrocodone and acetaminophen (Vicodin, Norco)   fentanyl patch (Duragesic)   hydromorphone (Dilaudid)   methadone  codeine (Tylenol #3)     What do opioids do well?   Opioids are best for severe short-term pain such as after a surgery or injury. They may work well for cancer pain. They may help some people with long-lasting (chronic) pain.     What do opioids NOT do  well?   Opioids never get rid of pain entirely, and they don t work well for most patients with chronic pain. Opioids don t reduce swelling, one of the causes of pain.                                    Other ways to manage chronic pain and improve function include:     Treat the health problem that may be causing pain  Anti-inflammation medicines, which reduce swelling and tenderness, such as ibuprofen (Advil, Motrin) or naproxen (Aleve)  Acetaminophen (Tylenol)  Antidepressants and anti-seizure medicines, especially for nerve pain  Topical treatments such as patches or creams  Injections or nerve blocks  Chiropractic or osteopathic treatment  Acupuncture, massage, deep breathing, meditation, visual imagery, aromatherapy  Use heat or ice at the pain site  Physical therapy   Exercise  Stop smoking  Take part in therapy       Risks and side effects     Talk to your doctor before you start or decide to keep taking opioids. Possible side effects include:    Lowering your breathing rate enough to cause death  Overdose, including death, especially if taking higher than prescribed doses  Worse depression symptoms; less pleasure in things you usually enjoy  Feeling tired or sluggish  Slower thoughts or cloudy thinking  Being more sensitive to pain over time; pain is harder to control  Trouble sleeping or restless sleep  Changes in hormone levels (for example, less testosterone)  Changes in sex drive or ability to have sex  Constipation  Unsafe driving  Itching and sweating  Dizziness  Nausea, throwing up and dry mouth    What else should I know about opioids?    Opioids may lead to dependence, tolerance, or addiction.    Dependence means that if you stop or reduce the medicine too quickly, you will have withdrawal symptoms. These include loose poop (diarrhea), jitters, flu-like symptoms, nervousness and tremors. Dependence is not the same as addiction.                     Tolerance means needing higher doses over time to  get the same effect. This may increase the chance of serious side effects.    Addiction is when people improperly use a substance that harms their body, their mind or their relations with others. Use of opiates can cause a relapse of addiction if you have a history of drug or alcohol abuse.    People who have used opioids for a long time may have a lower quality of life, worse depression, higher levels of pain and more visits to doctors.    You can overdose on opioids. Take these steps to lower your risk of overdose:    Recognize the signs:  Signs of overdose include decrease or loss of consciousness (blackout), slowed breathing, trouble waking up and blue lips. If someone is worried about overdose, they should call 911.    Talk to your doctor about Narcan (naloxone).   If you are at risk for overdose, you may be given a prescription for Narcan. This medicine very quickly reverses the effects of opioids.   If you overdose, a friend or family member can give you Narcan while waiting for the ambulance. They need to know the signs of overdose and how to give Narcan.     Don't use alcohol or street drugs.   Taking them with opioids can cause death.    Do not take any of these medicines unless your doctor says it s OK. Taking these with opioids can cause death:  Benzodiazepines, such as lorazepam (Ativan), alprazolam (Xanax) or diazepam (Valium)  Muscle relaxers, such as cyclobenzaprine (Flexeril)  Sleeping pills like zolpidem (Ambien)   Other opioids      How to keep you and other people safe while taking opioids:    Never share your opioids with others.  Opioid medicines are regulated by the Drug Enforcement Agency ([employer]). Selling or sharing medications is a criminal act.    2. Be sure to store opioids in a secure place, locked up if possible. Young children can easily swallow them and overdose.    3. When you are traveling with your medicines, keep them in the original bottles. If you use a pill box, be sure you also  carry a copy of your medicine list from your clinic or pharmacy.    4. Safe disposal of opioids    Most pharmacies have places to get rid of medicine, called disposal kiosks. Medicine disposal options are also available in every [facility]. Search your county and  medication disposal  to find more options. You can find more details at:  [email or web address]://[email or web address]-green/[email or web address]-[email or web address]-medications     I agree that my provider, clinic care team, and pharmacy may work with any city, state or federal law enforcement agency that investigates the misuse, sale, or other diversion of my controlled medicine. I will allow my provider to discuss my care with, or share a copy of, this agreement with any other treating provider, pharmacy or emergency room where I receive care.    I have read this agreement and have asked questions about anything I did not understand.    _______________________________________________________  Patient Signature - Jailene Breen _____________________                   Date     _______________________________________________________  Provider Signature - JUANITA Ga CNP   _____________________                   Date     _______________________________________________________  Witness Signature (required if provider not present while patient signing)   _____________________                   Date

## 2023-05-17 NOTE — TELEPHONE ENCOUNTER
From Anastasiya RANDHAWA    I would ay that Orencia and actemra have the easier free drug process.     Simponi has one but they dont make it easy for the patients.     Infusions potentially could be covered, but he still would have a deductible and be responsible for his max out of pocket

## 2023-05-17 NOTE — TELEPHONE ENCOUNTER
Let patient know the information about cost and new Rx sent for Orencia.  Patient is in agreement to plan.  Information sent over cartmi regarding the medication.     Patient has done injections before and does not need training.    Renetta Swan RN

## 2023-05-17 NOTE — TELEPHONE ENCOUNTER
PA Initiation    Medication: ORENCIA CLICKJECT 125 MG/ML SC SOAJ  Insurance Company: [employer] Part D - Phone [phone] Fax [phone]  Pharmacy Filling the Rx:  FREE DRUG -pt requested pap  Filling Pharmacy Phone:    Filling Pharmacy Fax:    Start Date:  05/17/2023            BONG Pressley, [facility]  [facility]    [email or web address]     Phone: [phone]  Fax: [phone]

## 2023-05-17 NOTE — PATIENT INSTRUCTIONS
Plan:    Check out Shmu[facility] online for gentle exercise  Physical Therapy:  Referral to PT  Clinical Health Psychologist: continue work with your psychiatrist and therapist  Diagnostic Studies:  None  Medication Management:    continue Subutex 8mg twice per day  Oxycodone for pain flare short term  Continue medical cannabis   Start tizanidine 4mg three times daily as needed for muscle spasms  Further procedures recommended: repeat lumbar RFA, we will call to set this up  You can reach out to the [facility] here whenever this is good for you. (previously deferred due to out of pocket costs)   [facility]   [address], [address]   [place] [address]   Phone: [phone]   Fax: [phone]   Recommendations to PCP: see above  UDT today, last took Suboxone this morning and oxycodone on Sunday (3 days ago)   Follow up:12 weeks in-person visit. Please call [phone] to make your follow-up appointment with me.     ----------------------------------------------------------------  Clinic Number:  [phone]   Call with any questions about your care and for scheduling assistance.   Calls are returned Monday through Friday between 8 AM and 4:30 PM. We usually get back to you within 2 business days depending on the issue/request.    If we are prescribing your medications:  For opioid medication refills, call the clinic or send a [employer] message 7 days in advance.  Please include:  Name of requested medication  Name of the pharmacy.  For non-opioid medications, call your pharmacy directly to request a refill. Please allow 3-4 days to be processed.   Per [place] State Law:  All controlled substance prescriptions must be filled within 30 days of being written.    For those controlled substances allowing refills, pickup must occur within 30 days of last fill.      We believe regular attendance is key to your success in our program!    Any time you are unable to keep your  appointment we ask that you call us at least 24 hours in advance to cancel.This will allow us to offer the appointment time to another patient.   Multiple missed appointments may lead to dismissal from the clinic.

## 2023-05-17 NOTE — PROGRESS NOTES
"  [facility]      5/17/2023        Chief complaint:   Bilateral low back pain right > Left- no radiation into the legs  Hands, wrists, shoulders and bilateral knees   Low back pain is the most bothersome right now  \" I hurt all over, joint pain is the most bothersome\"      Interval history:   Jailene Breen is a 57 year old male is known to me for   Lumbar spondylosis, pain is worse with extension and rotation indicating a facetogenic component to pain  Lumbar degenerative disc disease  SI joint pain  Ankylosing spondylitis  Myofascial pain  Chronic pain syndrome  PMHx includes: Panic attacks, back pain, arthritis, anxiety, ankylosing spondylitis  PSHx includes: Right knee surgery ×2, left foot surgery right knee arthroscopy        Recommendations/plan at the last visit on 11/23/2022 included:  1. Recommended to increase activity while pacing himself  2. Physical Therapy:  Consider in the future  3. Clinical Health Psychologist: continue work with your psychiatrist and therapist  4. Diagnostic Studies:  None  5. Medication Management:    6. Starting today, you may use Subutex 8mg tabs 0.5-1 tab up to 4 times daily, max of 2 full tabs per day.   7. Oxycodone for pain flare short term  8. Continue medical cannabis   9. Further procedures recommended:none  10. You can reach out to the [facility]  Program here whenever this is good for you. (previously deferred due to out of pocket costs)   [facility]   [address], [address]   [facility] [address]   Phone: [phone]   Fax: [phone]     11. Recommendations to PCP: see above  12. Follow up:12 weeks in-person visit. Please call [phone] to make your follow-up appointment with me.           Since his last visit, Jailene rBeen reports:    Interval history May 17, 2023  -having a lot of pain, he is off of Rheumatological medication. Will be starting a new med but needs to sign up for " financial assistance first. Med likely to be certolizumab pegol.  -discussed 2 cancelled appts in a row and a no show in April. Stressed importance of being seen at least quarterly while on opiate medication  -multiple joint pain and pain all over is the most bothersome   -he is off of Enbrel now    Interval history November 23, 2022  -he is not currently on Enbrel, hoping get supply this week  -having more joint pain off of Enbrel. Notices that that pain worsens the day before he is due for his injection. Also continues on Methotrexate.   -would like to increase Subutex dose. Had been on 16 mg a day of Suboxone, but reduced prior to surgery and then did not want to increase due to cost. Now on Subtex and since this is substantially more affordable would like to increase from current dose of 12 mg per day back to 16 mg per day as this was more effective for pain management.  - Using prn oxycodone when pain is increased during flares, primarily notes pain in lower back, and into his joints during flares  - Would like to join a gym with a pool, thinks that he could improve his strength and endurance if he were able to exert himself safely in the pool.   - He is planning on working on dietary changes in the new year.     Interval history August 31, 2022  -he is back on DMARDs for his RA. (Enbrel and methotrexate)  -low back pain now on both sides. No radiation to the legs  -multiple joint pain from RA/AK  -tripped over dog while carrying groceries up the stairs and he struck his right knee  -he notices he has been sweating more easily  -notes his weight is close to 300 lbs.     Interval history May 27, 2022  -having an arthritis flare as is currently off of his disease modifying agents. This has now been covered and he will begin this again soon (Enbrel).   -having pain in multiple joints and low back, see above.     Interval history February 4, 2022  -he awoke on 1/1/2022 at 3:30 AM and felt like he was going to die.  He called the ambulance and went to the hospital. He was then diagnosed with COVID-19 on 1/3/2022 and has been feeling sick ever since. He has been nauseated and has not been able to take his RA DMARD.   -he has increased joint pain in all of his joints, low back pain and neck pain is worse. He cannot dress himself due to pain. States his finger joints are all swollen. He is using marijuana from [place] Cannabis program and prednisone from  Rheumatology.   -his rheumatologist is out of the clinic for a week. The covering rheumatologist on call felt he could re-start his rheumatology medication. Jamison is not comfortable re-starting his RA medication yet since he is still feeling sweaty and coughing and nauseated.     Interval history October 20, 2021  -he is hurting all over as he previously tapered off of the Suboxone in prep for his hernia surgery. This surgery was postponed due to no beds available due to COVID-19 pandemic. His pain is bad now with no pain medication on board.   -Jamison had preferred to taper off of Suboxone in prep for surgery despite discussing ability to medicate over it and maintain his previous dosing.   -explained that best course of action would be to get him back on Suboxone between 8-16mg/day during the perioperative period (he had been on 16mg/day in divided dosing prior to taper).   -Les did not previously feel that Suboxone was very helpful, but he does notice that his pain is worse being off of the Suboxone.   -he had his court case for disability recently and he states that his  feels that it went well, waiting to see if this ruling will be in his favor.     Interval history April 21, 2021  -he had COVID injection on Friday 4/16/2021 this was his 2nd injection. Letona crummy over the weekend.   -he is still dealing with the right hip pain that is the worst pain  -he is having left hip pain as well   -bilateral knee pain as well. It is hard for him to do stairs due to pain.   -he feels  "that the increase in Suboxone to 4/1mg QID has been a \"titch\" helpful.   -he does not find Voltaren gel or Aspercreme with 4% lidocaine helpful for his knee pain.  -he has finished the partial hospitalization program.  -he will be starting the adult day program for psychiatric care.     Interval history April 6, 2021  -Les states he has been feeling like \"hell.\"   -his low back is bothering him and it will radiate into his right groin and down his right leg to the foot.   -he is walking with a cane.   - He was hospitalized twice recently for suicidal ideation. Mood is still low. Denies SI   -he is attending the Partial Hospitalization program. This is all on Zoom. Lasts for 6 hours Monday through Friday.   -he is also working with his psychiatrist and psychologist in addition to that.  -he tries to walk a few times per week, he thinks this is about a half a mile or so.  -he also notes he tries not to go out much to avoid injury or over-activity.   -he is back on Enbrel and methotrexate. He notes that this is a bit helpful for his joint pain.   -he does not get any relief from the Subxone. He states it \"doesn't do anything.\" he relates he has been on OxyContin 20mg TID in the past. He is frustrated that I switched him off of Oxycodone/OxyContin when he was in the hospital for SI. We discussed again how being on full  Mu-agonist opiates are not a good option for him. Additionally, when we began working together on 3/31/2017, he was NOT on daily opiates. In the time we have worked together, he has gone from being on no opiates on a daily basis to #15 tabs/month of oxycodone to slowly increasing to Oxycodone 35mg/day in divided dosing using OxyContin 10mg BID and oxycodone 5mg TID PRN which is 52mg OME (oral morphine equivalent, also known as morphine milligram equivalent-MME). In that time, his functional level and his mood has deteriorated despite increasing opiate dosages. -  -discussed I am open to continuing " "Suboxone and recommend increasing to 4/1mg every 8 hours, a 50% increase. Patient stated that \"that isn't going to be high enough.\" when I asked what he felt might be helpful, he stated he felt the dosage should be tripled. Explained that doing so is not a safe choice. Additionally, discussed that Suboxone hits the peak pain relief (plateaus) at between 16-18mg/day as the mu-receptors are all flooded.   He is concerned re: having upcoming surgery for a hiatal hernia in May. Reviewed that he can be on short-acting opiates as his surgeon deems necessary, but that he would be tapered off of them and continue on Suboxone with me long term.     Interval history February 16, 2021  -He has had more pain since the right  RFA done 2/1/2021.  -he slipped taking his garbage out last week, he tweaked his back, did not fall.    -he is off of his RA meds as he has an upper respiratory infection  -the extreme cold makes his arthritis pain worse  -he may be having a surgery to fix a hiatal hernia  -flexeril caused urinary retention, this was better when he stopped taking it.     Interval history 11/11/2020  -he has been having issues with emptying his bladder and he has had 2 Carrasco catheters in the near future  -he is trying to get in with a [facility]rologist.  -we discussed how opiate medication can cause urinary hesitancy  -he feels like the urinary symptoms began a couple of weeks after he began lexapro.  -he states he recently found out that his biological dad has had issues with his bladder.   -he had 2nd lumbar medial branch block done today with Dr. Ashlyn Gamble, we are working toward lumbar RFA..     Interval history 9/25/2020  -He has been sick 10 days ago,  had been on antibiotics and was not able to take his RA meds. He then felt better and then is now feeling worse again. Had been tested for COVID-19  -how his whole house is not feeling well.   -he is off of his RA meds, and so his pain is worse because he doesn't " "feel well, he has a really deep cough.  -having 2nd LMBB next week on the left side, then plan to do bilateral lumbar RFA  -he can't sleep due to body aches and then his back pain is worse.   -recently seen by psychiatry for first time this week, placed on escitalopram for depression/anxiety and hydroxyzine for panic/anxiety PRN, Les tells me that the psychiatrist wants him to stop using the clonazepam.     Interval history 6/10/2020  -having bilateral low back pain that radiates into the groin at times  -having multiple joint pain from RA, his RA flare is better as he is back on the Enbrel now  -he would love to get a new bed as he has issues getting comfortable in bed.   -he is having more left sided low back pain, worse with lumbar extension and extension and rotation. He is interested in pursing possible left sided lumbar RFA.    Interval 2020  -his 28 year old niece overdosed and  last Saturday, she had a 12,5 and 1 year old.   -he is going to her  today  -he did get his Enbrel yesterday  -he is going to see a new rheumatologist in July with the [facility]    -he says his pain is now a bit better  -he has multiple joint pain from RA and chronic low back pain        At this point, the patient's participation with our multidisciplinary team includes:  The patient has been compliant with the program.  PT - last PHYSICAL THERAPY with Asif Saldana on 2020  Health Psych - worked with  Padilla Keene, last on 2018.  Working with Jonna Farrell PhD and been seen >8 times. Last visit with Santa was on 2020         Pain scores:    Pain intensity on average is 7 on a scale of 0-10.    Range is 5-10/10. (his pain can bring him to tears)  Pain right now is 9/10.   Pain is described as \"sharp, throbbing, stabbing, burning,  sometimes I feel like an ice pick is being stabbed in my lower back.\"  Pain is constant in nature      Current pain relevant medications:      -nortriptyline 50mg at " bedtime (helpful)  -Enbrel 50mg/mL (not currently taking--will be switching agents, just saw Rheum yesterday)  -ibuprofen 800mg TID PRN (using 3 times daily-helpful)  -Tylenol 500mg using 1000mg TID (unsure if helpful)  -Maxalt 10mg PRN migraine (helpful for migraine--he does have visual aura)  -naloxone nasal spray PRN for accidental opiate overdose  -Subutex 8 mg BID (helps some)      Other pertinent medications:   -clonazepam 1mg tab, may take 0.5-1mg BID PRN anxiety (helpful, has been using infrequently)  Fluoxetine 40mg every day (somewhat helpful, managed by psychiatry)  -quetiapine 150mg at bedtime        Previous Medications:   OPIATES: Oxycodone (helpful), Fentanyl (100mcg/hr patch helpful), hydrocodone (itching), Tramadol (not helpful), Suboxone (initially felt it was not helpful, but after he tapered off of it due to his preference prior to surgery, he now feels that it was helpful for his pain)  NSAIDS: ibuprofen (not helpful), Aleve (not helpful)  MUSCLE RELAXANTS: methocarbamol (somewhat helpful), Flexeril (somewhat helpful but caused severe urinary retention requiring catheterization), tizanidine (unsure if helpful), metaxalone (unsure), SOMA (helpful), chlorzoxazone (helped some)  ANTI-MIGRAINE MEDS: Maxalt (helpful for migraine), Imitrex injection (somewhat helpful)  ANTI-DEPRESSANTS: Prozac (helpful), Cymbalta (felt weird on it), nortriptyline (unsure if helpful), Buspar (unsure), Remeron (blacked out), bupropion (not helpful for smoking cessation)  SLEEP AIDS: Ambien (helpful)  ANTI-CONVULSANTS: gabapentin (felt odd on this medication, unsure of dose), Lyrica (not helpful for 4 months, unsure of dose), Topamax (not helpful, he felt weird on it)   TOPICALS: Lidocaine patches (not helpful), Voltaren gel (not helpful)  Other meds: Tylenol (unsure if helpful)        Other treatments have included:   Jailene Breen has been seen at a pain clinic in the past.  [facility]  PT: tried, not  "helpful  Chiropractic: tried, not helpful  Acupuncture: none  TENs Unit: tried, helpful         Injections:     -has had injections at outside clinics  -has had epidural injections for low back pain (one was helpful)  -he has had lumbar medial branch blocks at [facility] and he was going to have lumbar radiofrequency ablation (did not do this due to cost)  -4/3/2017 right LMBBs with Dr. Ashlyn Gamble (helpful)  -4/26/2017 right LMBBs with Dr. Ashlyn Gamble (helpful)  -5/31/2017 right L3, L4, L5 RFA with Dr. Ashlyn Gamble (good relief for over 6 months)  -3/15/2018 right L5 transforaminal MAKAYLA with Dr. Ashlyn Gamble (not helpful)  -5/10/2018 trigger point injections with Dr. Ashlyn Gamble(somewhat helpful).  -7/12/2018 Right L3, L4, L5 RFA with Dr. Ashlyn Gamble (helpful).  -9/20/2018 Left piriformis injections, bilateral gluteal trigger point with Dr. Gamble (helpful).  -1/31/2019 right L2-3, L3-4 and L4-5 facet joint injections with Dr. Ashlyn Gamble (somewhat helpful)  4/4/2019  right L3, L4, L5/sacral ala lumbar radiofrequency ablation with Dr. Gamble (helpful over right side)  6/28/2019 Bilateral facet joint injections at l4-5, L5-S1 with Dr. Holley (only helpful for 7 days)  -2/12/2020 right D1-F9-F1-sacral ALA RFA done with Dr. Ashlyn Gamble (helpful)  -8/26/2020 left L3-5 lumbar medial branch blocks #1 with Dr. Ashlyn Gamble (very helpful)  -11/11/2020 left L3-5 lumbar medial branch blocks #2 with Dr. Ashlyn Gamble  (helpful)   -2/1/2021 bilateral lumbar RFA L4-5 and L5-S1 with Dr. Ashlyn Gamble   (this \"helped a little bit\" and then he tweaked his back about 2 weeks after it was done and it wasn't helpful)  -09/22/2022 lumbosacral RFA L4, L5 and sacral ala with Dr. Aguilar Holley (helpful,, at least 50% relief, he has a RA flare right now, so harder to tell)      THE 4 A's OF OPIOID MAINTENANCE ANALGESIA    Analgesia: Subutex is helpful as is oxycodone    Activity: not much    Adverse effects: " none    Adherence to Rx protocol: yes          Side Effects: no side effect  Patient is using the medication as prescribed: YES    Medications:  Current Outpatient Medications   Medication Sig Dispense Refill     buprenorphine (SUBUTEX) 8 MG SUBL sublingual tablet Place 1 tablet (8 mg) under the tongue 2 times daily Max 2 tabs per day. Fill 06/01/23 to start 06/03/23 60 tablet 0     certolizumab pegol (CIMZIA) 2 X 200 MG/ML PSKT 2 syringes/kit Inject 2 mLs (400 mg) Subcutaneous every 30 days Hold for signs of infection, and seek medical attention. 2 each 3     folic acid (FOLVITE) 1 MG tablet Take 3 tablets (3 mg) by mouth daily 270 tablet 2     liothyronine (CYTOMEL) 25 MCG tablet Take 1 tablet (25 mcg) by mouth daily 90 tablet 0     medical cannabis (Patient's own supply) See Admin Instructions (The purpose of this order is to document that the patient reports taking medical cannabis.  This is not a prescription, and is not used to certify that the patient has a qualifying medical condition.)     Pills PRN   Lozenges PRN       methotrexate 2.5 MG tablet Take 8 tablets (20 mg) by mouth every 7 days 96 tablet 0     oxyCODONE (ROXICODONE) 5 MG tablet Take 1 tablet (5 mg) by mouth every 6 hours as needed for severe pain Max of 3/day. Fill/begin 05/17/23. Short term script for acute pain flare. 42 tablet 0     Abatacept (ORENCIA) 125 MG/ML SOAJ auto-injector Inject 1 mL (125 mg) Subcutaneous every 7 days Hold for signs of infection, and seek medical attention. 4 mL 1     atorvastatin (LIPITOR) 20 MG tablet Take 1 tablet (20 mg) by mouth daily 90 tablet 0     FLUoxetine (PROZAC) 40 MG capsule Take 2 capsules (80 mg) by mouth daily 180 capsule 0     prazosin (MINIPRESS) 1 MG capsule Take 1 capsule (1 mg) by mouth At Bedtime 90 capsule 0     QUEtiapine (SEROQUEL) 25 MG tablet Take 1 tablet (25 mg) by mouth At Bedtime 90 tablet 0     tiZANidine (ZANAFLEX) 4 MG tablet Take 1 tablet (4 mg) by mouth 3 times daily as needed  for muscle spasms 45 tablet 1       Medical History: any changes in medical history since they were last seen? No    Social History:    Home situation: , has 2 grown children  Occupation/Schooling: currently unemployed, worked as a  (unemployed since 3/1/2017). 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  Uses nicotine gum  If yes, was cessation counseling offered?  None needed        Physical Exam:     Vital signs: BP (!) 149/92   Pulse 69      Behavioral observations:  Awake, alert and cooperative    Gait:  Slow, has single ended cane    Musculoskeletal exam:    Strength grossly equal throughout    Neuro exam: deferred    Skin/vascular/autonomic:  No suspicious lesions on exposed skin.     Other:  na          [place] Prescription Monitoring Program:  Reviewed [facility] 5/17/2023- no concerning fills.  Susana GRANT RN CNP, FNP  [facility]  [facility]          DIRE Score for selecting candidates for long term opioid analgesia for chronic pain:  Diagnosis  2  Intractablility  2  Risk    Psychological health  1    Chemical health  2    Reliability  2    Social support  2  Efficacy  1  Total DIRE Score = 12. Note that  7-13 predicts poor outcome (compliance and efficacy) from opioid prescribing; 14-21 predicts good outcome (compliance and efficacy)  from opioid prescribing    Assessment:    1. Degenerative disc disease of the lumbar spine  2. Lumbar facet joint pain  3. Spondylosis of lumbar region without myelopathy or radiculopathy  4. Ankylosing spondylitis of lumbosacral region  5. Rheumatoid arthritis involving multiple joints with positive rheumatoid factor  6. Multiple joint pain  7. Right hip pain  8. Muscle spasm  9. Uncomplicated opiate  dependence  10. Chronic continuous use of opioids  11. Encounter for long-term use of opiate analgesic  12. UDT 5/17/2023  13. Signed CSA 5/17/2023    14. PMHx includes: Panic attacks, back pain, arthritis, anxiety, ankylosing spondylitis  15. PSHx includes: Right knee surgery ×2, left foot surgery right knee arthroscopy      Plan:    1. Check out Shmuel Murray online for gentle exercise  2. Physical Therapy:  Referral to PT  3. Clinical Health Psychologist: continue work with your psychiatrist and therapist  4. Diagnostic Studies:  None  5. Medication Management:    6. continue Subutex 8mg twice per day  7. Oxycodone for pain flare short term  8. Continue medical cannabis   9. Start tizanidine 4mg three times daily as needed for muscle spasms  10. Further procedures recommended: repeat lumbar RFA, we will call to set this up  11. You can reach out to the Comprehensive Weight Management  Program here whenever this is good for you. (previously deferred due to out of pocket costs)  12.  Weight Loss Clinic   13. [address]   14. [place] [address]   15. Phone: [phone]   16. Fax: [phone]   17. Recommendations to PCP: see above  18. UDT today, last took Suboxone this morning and oxycodone on Sunday (3 days ago)  19.  Follow up:12 weeks in-person visit. Please call [phone] to make your follow-up appointment with me.         ASSESSMENT AND PLAN:  (M51.36) DDD (degenerative disc disease), lumbar  (primary encounter diagnosis)  Plan: oxyCODONE (ROXICODONE) 5 MG tablet,         buprenorphine (SUBUTEX) 8 MG SUBL sublingual         tablet, Physical Therapy Referral, PAIN         INJECTION EVAL/TREAT/FOLLOW UP, Adult Pain         Clinic Follow-Up Order (Blank)            (M54.59) Lumbar facet joint pain  Plan: oxyCODONE (ROXICODONE) 5 MG tablet,         buprenorphine (SUBUTEX) 8 MG SUBL sublingual         tablet, Physical Therapy Referral, PAIN         INJECTION EVAL/TREAT/FOLLOW UP, Adult  Pain         Clinic Follow-Up Order (Blank)            (M47.817) Spondylosis without myelopathy or radiculopathy, lumbosacral region  Plan: Drug Confirmation Panel Urine with Creat, PAIN         INJECTION EVAL/TREAT/FOLLOW UP, Adult Pain         Clinic Follow-Up Order (Blank)            (M45.7) Ankylosing spondylitis of lumbosacral region (H)  Plan: oxyCODONE (ROXICODONE) 5 MG tablet,         buprenorphine (SUBUTEX) 8 MG SUBL sublingual         tablet, Physical Therapy Referral, Adult Pain         Clinic Follow-Up Order (Blank)            (M05.79) Rheumatoid arthritis involving multiple sites with positive rheumatoid factor (H)  Plan: oxyCODONE (ROXICODONE) 5 MG tablet,         buprenorphine (SUBUTEX) 8 MG SUBL sublingual         tablet, Physical Therapy Referral, Adult Pain         Clinic Follow-Up Order (Blank)            (M25.50) Multiple joint pain  Plan: oxyCODONE (ROXICODONE) 5 MG tablet,         buprenorphine (SUBUTEX) 8 MG SUBL sublingual         tablet, Physical Therapy Referral, Adult Pain         Clinic Follow-Up Order (Blank)            (M25.551) Hip pain, right  Plan: oxyCODONE (ROXICODONE) 5 MG tablet,         buprenorphine (SUBUTEX) 8 MG SUBL sublingual         tablet, Physical Therapy Referral, Adult Pain         Clinic Follow-Up Order (Blank)            (M62.838) Muscle spasm  Plan: Adult Pain Clinic Follow-Up Order (Blank),         DISCONTINUED: tiZANidine (ZANAFLEX) 4 MG tablet            (F11.20) Uncomplicated opioid dependence (H)  Plan: buprenorphine (SUBUTEX) 8 MG SUBL sublingual         tablet, Adult Pain Clinic Follow-Up Order         (Blank)            (F11.90) Chronic, continuous use of opioids  Plan: buprenorphine (SUBUTEX) 8 MG SUBL sublingual         tablet, Drug Confirmation Panel Urine with         Creat, Ethanol urine, Adult Pain Clinic         Follow-Up Order (Blank)            (Z79.891) Encounter for long-term use of opiate analgesic  Plan: Ethanol urine, Physical Therapy Referral,  Adult        Pain Clinic Follow-Up Order (Blank)            Face to face time: 28 minutes         Susana GRANT RN CNP, FNP  [facility]

## 2023-05-17 NOTE — TELEPHONE ENCOUNTER
Schedule DA with AC and bridge care with TC  Due: Today Received: Today  Ananya Ivan L, LICSW  P Tc Referrals For Review    First attempt to contact pt. Writer left a VM with TC contact info and encouraged a phone call back to schedule initial therapy appointment and to schedule DA with Assessment center as requested by provider. Writer will postpone for tomorrow.    Macie Mederos  05/17/2023  930    ----- Message from Pan Garcia sent at 5/15/2023  2:21 PM CDT -----  Transition Clinic Referral   [place]         Please Check Type of Referral Requested:       __x__THERAPY: The Transition clinic is able to schedule patients without current medical insurance; these patient will be referred to our Social Work Care Coordinator for Medical Insurance              Assistance. We are open for referral for psychotherapy. Patient is referred from:  [facility]      ___      Referring Provider Contact Name: [facility]; Phone Number: [phone]    Reason for Transition Clinic Referral: Therapy    Next Level of Care Patient Will Be Transitioned To: [facility]  Provider(s)Haim Quezada  Location [facility]  Date/Time 9/18/2023    What Would Be Helpful from the Transition Clinic: Therapy     Needs: NO    Does Patient Have Access to Technology: Yes    Patient E-mail Address: [email or web address]    Current Patient Phone Number: [phone]; N/A    Clinician Gender Preference (if applicable): NO    Patient location preference: [place]

## 2023-05-18 ENCOUNTER — TELEPHONE (OUTPATIENT)
Dept: BEHAVIORAL HEALTH | Facility: CLINIC | Age: 58
End: 2023-05-18
Payer: COMMERCIAL

## 2023-05-18 ENCOUNTER — TELEPHONE (OUTPATIENT)
Dept: PALLIATIVE MEDICINE | Facility: CLINIC | Age: 58
End: 2023-05-18

## 2023-05-18 LAB
GAMMA INTERFERON BACKGROUND BLD IA-ACNC: 0.05 IU/ML
M TB IFN-G BLD-IMP: NEGATIVE
M TB IFN-G CD4+ BCKGRND COR BLD-ACNC: 9.95 IU/ML
MITOGEN IGNF BCKGRD COR BLD-ACNC: 0 IU/ML
MITOGEN IGNF BCKGRD COR BLD-ACNC: 0 IU/ML
QUANTIFERON MITOGEN: 10 IU/ML
QUANTIFERON NIL TUBE: 0.05 IU/ML
QUANTIFERON TB1 TUBE: 0.05 IU/ML
QUANTIFERON TB2 TUBE: 0.05

## 2023-05-18 NOTE — TELEPHONE ENCOUNTER
repeat bilateral L4, L5 and sacral ALA RFA last done September 2022 with at least 50-60%              Pj Donovan    [facility] [place]

## 2023-05-18 NOTE — TELEPHONE ENCOUNTER
Reached patient. Pt has Medicare so unable to schedule DA with assessment center. Initial TC Therapy (and DA) scheduled with Dejon BUTLER 5/30/2023.    Abigail Denson  Transition Clinic Coordinator  Date and Time: 05/18/23 8:01 AM      ----- Message from Pan Garcia sent at 5/15/2023  2:21 PM CDT -----  Transition Clinic Referral   [place]         Please Check Type of Referral Requested:       __x__THERAPY: The Transition clinic is able to schedule patients without current medical insurance; these patient will be referred to our Social Work Care Coordinator for Medical Insurance              Assistance. We are open for referral for psychotherapy. Patient is referred from:  [facility]      ___      Referring Provider Contact Name: [facility]; Phone Number: [phone]    Reason for Transition Clinic Referral: Therapy    Next Level of Care Patient Will Be Transitioned To: [facility]  Provider(s)Haim Hassan [facility]  Date/Time 9/18/2023    What Would Be Helpful from the Transition Clinic: Therapy     Needs: NO    Does Patient Have Access to Technology: Yes    Patient E-mail Address: [email or web address]    Current Patient Phone Number: [phone]; N/A    Clinician Gender Preference (if applicable): NO    Patient location preference: Virtual    Pan Garcia

## 2023-05-19 ENCOUNTER — TELEPHONE (OUTPATIENT)
Dept: PSYCHIATRY | Facility: CLINIC | Age: 58
End: 2023-05-19
Payer: COMMERCIAL

## 2023-05-19 LAB
BUPRENORPHINE UR CFM-MCNC: 374 NG/ML
BUPRENORPHINE/CREAT UR: 183 NG/MG {CREAT}
NORBUPRENORPHINE UR CFM-MCNC: ABNORMAL NG/ML

## 2023-05-19 NOTE — TELEPHONE ENCOUNTER
Katie Corley MD [place], Kayla, [address],     Keeping you in the loop; SW team will follow up with the patient re: his appointments     Could you please check in with him regarding his depression, sleep and how he is doing on prazosin ( we re-started today) when you have a chance next week?     Thank you,     Katie     Follow Up:  Writer called patient to check in. Patient brief with responses, but patient had been sleeping prior to call. Patient denies any improvement in his depressive symptoms, but denies any safety concerns of SI, SIB, and HI.    Patient states he restarted prazosin, but he has only taken it for two days so far. He reports it has helped with the nightmares, but he has been tired in the morning.     Patient confirms that was able to schedule therapy appointments and upon chart review, it appears he has an appointment on 5/30. Patient states he has not received a call from social work yet.     Patient denies any other questions or concerns at this time and will contact the clinic if this changes.

## 2023-05-19 NOTE — TELEPHONE ENCOUNTER
PER [facility]:  Notification or Prior Authorization is not required for the requested services    Decision ID #:[id number]          [facility] Medical Policy- No PA required    Guidelines        Repeat thermal facet joint RFA at the same anatomic site is considered medically reasonable and necessary provided the patient had a minimum of consistent 50% improvement in pain for at least six (6) months or at least 50% consistent improvement in the ability to perform previously painful movements and ADLs as compared to baseline measurement using the same scale;  Frequency Limitation: For each covered spinal region no more than two (2) radiofrequency sessions will be reimbursed per rolling 12 months.      Okay to schedule repeat LRFA    Yasemin MILLAN    [facility]

## 2023-05-19 NOTE — TELEPHONE ENCOUNTER
Prior Authorization Approval    Medication: ORENCIA CLICKJECT 125 MG/ML SC SOAJ  Authorization Effective Date: 5/19/2023  Authorization Expiration Date: 11/17/2023  Approved Dose/Quantity: 125mg  Reference #: [id number]   Insurance Company: [employer] - Phone [phone] Fax [phone]  Expected CoPay:       CoPay Card Available:      Financial Assistance Needed: yes going to pursue PAP  Which Pharmacy is filling the prescription:    Pharmacy Notified:  No   Patient Notified:  NO            FREE DRUG APPLICATION INITIATED    Medication: ORENCIA CLICKJECT 125 MG/ML SC SOAJ  Free Drug Program Name:  [facility]  Start Date: 5/19/2023  Phone #: [phone]  Fax #: [phone]  Additional Information:     Writer has emailed Care team MD forms and PT forms    Writer will reach out to patient regarding free drug forms needing completion    Please FAX completed forms to   [facility] phone [phone]          BONG Pressley, [facility]  Specialty Pharmacy Clinic Liaison     [facility]    [email or web address]     Phone: [phone]  Fax: [phone]

## 2023-05-22 ENCOUNTER — PATIENT OUTREACH (OUTPATIENT)
Dept: CARE COORDINATION | Facility: CLINIC | Age: 58
End: 2023-05-22
Payer: COMMERCIAL

## 2023-05-22 NOTE — PROGRESS NOTES
Clinic Care Coordination Contact    [facility] contacted patient to follow up. Patient states that he got scheduled for a psychotherapy appointment in September. Patient denies needing any assistance from me at this time. Patient was encouraged to reach out if any social service needs arise.    Care coordinator will do no further outreaches at this time.    Mariam Ramirez, [facility]  Clinic Care Coordination  [facility]  [email or web address]  [phone]

## 2023-05-22 NOTE — TELEPHONE ENCOUNTER
Forms completed and waiting for MD signature when in clinic on 05/24.     Ashlyn Dunbar, BSN, RN, PHN  Medical Specialty Care Coordinator  [facility]

## 2023-05-22 NOTE — TELEPHONE ENCOUNTER
Screening Questions for RFA Procedure      Procedure ordered? repeat LRFA    What insurance are we billing for this procedure?  [facility]  IF SCHEDULING AT [facility] PLEASE SCHEDULE AT LEAST 7-10 BUSINESS DAYS OUT SO A PA CAN BE OBTAINED    Is patient scheduled at [facility]?   If YES, route every encounter to [facility] CARE NAVIGATION POOL [[phone]]  Has patient had this injection before? Yes:   Any chance of pregnancy? Not Applicable   If YES, do NOT schedule and route to RN pool     Is  Needed?: No  Will patient have a ?  Yes   If pt is given sedation meds, no driving for 24 hours.  Is pt taking a cab or transportation service? NO        If so will need to be accompanied by an adult too (friend/family member) in order for IV sedation to be given.      Per [place] Policy:  Outpatients are to have responsible adult or family member to accompany them at discharge and drive them home. A service providing medically trained drivers or attendants would be acceptable. Public transportation would not be acceptable unless the patient is accompanied by a responsible adult or family member.  Is patient taking any blood thinners (i.e. plavix, coumadin, jantoven, warfarin, heparin, pradaxa or dabigatran, etc)? No   If YES, do NOT schedule, and route to RN pool    Is patient taking any aspirin products? No     If more than 325mg/day, OK to schedule; Instruct pt to decrease to less than 325 mg for 7 days AND route to RN pool    For CERVICAL procedures, hold all aspirin products for 6 days.     Tell pt that if aspirin product is not held for 6 days, the procedure WILL BE cancelled.      Does the patient have a bleeding or clotting disorder? No     If YES, it it OKAY to schedule AND route to RN pool    **For any patients with platelet count <100, must be forwarded to provider**    Is patient diabetic? No If YES, have them bring their glucometer.    Does patient have an active infection  or treated for one within the past week? No   If YES, do NOT schedule and route to RN nurse pool     Is patient currently taking any antibiotics?  No    For patients on chronic, preventative, or prophylactic antibiotics, procedures may be scheduled.     For patients on antibiotics for active or recent infection:antibiotic course must have been completed for 4 days    Is patient currently taking any steroid medications? (i.e. Prednisone, Medrol)  No     For patients on steroid medications, course must have been completed for 4 days    Is patient actively being treated for cancer or immunocompromised, including the spleen having been removed? No  If YES, do NOT schedule and route to RN pool     Any history of complications with sedation medications?  NO   If YES, OK to schedule AND route to RN pool     Any history of sleep apnea?  NO   If YES, OK to schedule AND route to RN pool     Any cardiac history?  NO   If YES, OK to schedule AND route to RN pool     Do you have an implanted pacemaker, ICD (implanted cardiac device) or AICD (automatic implanted cardiac device)?  NO    If YES, do NOT schedule AND route to RN pool.     Obtain name of device :       Obtain name of cardiologist:       Do you have an implanted stimulator?  NO    If YES, OK to schedule AND route to nursing.     Instruct patient to bring in the remote to the appointment and it will need to be turned off.  reviewed      Does patient have an allergy to contrast dye, iodine or shellfish?  No   If YES, OK to schedule. Route to RN pool AND add allergy information to appointment notes    Are you able to get on and off an exam table with minimal or no assistance? Yes   If NO, do NOT schedule and route to RN pool    Are you able to roll over and lay on your stomach with minimal or no assistance? Yes   If NO, do NOT schedule and route to RN pool    Reminders:    If you are started on any steroids or antibiotics between now and your appointment, you  must contact us because it may affect our ability to perform your procedure.  Yes    Informed patient that s/he needs to fast for 6 hours before procedure?  YES    Informed patient that it is OK to take normal medications with sips of water, especially blood pressure medications, before the procedure and must hold blood thinners as instructed.  Yes    Informed patient to arrive 30 minutes before procedure time to have an IV inserted.  reviewed   Do NOT schedule at 0745, 0815 or 1245.  reviewed     All radiofrequency ablations are in a 90 minute time slot.  reviewed

## 2023-05-23 NOTE — RESULT ENCOUNTER NOTE
Urine drug testing as expected. No illicit medication or alcohol noted. Continue standard monitoring while on opiates.   Susana GRANT RN CNP, FNP  [facility] [place]

## 2023-05-24 NOTE — TELEPHONE ENCOUNTER
Forms signed and faxed to [employer] at [phone]. Fax confirmed by [employer].     Ashlyn Dunbar, KERLINEN, RN, PHN  Medical Specialty Care Coordinator  [facility]

## 2023-05-30 ENCOUNTER — TRANSFERRED RECORDS (OUTPATIENT)
Dept: HEALTH INFORMATION MANAGEMENT | Facility: CLINIC | Age: 58
End: 2023-05-30
Payer: COMMERCIAL

## 2023-05-30 ENCOUNTER — VIRTUAL VISIT (OUTPATIENT)
Dept: BEHAVIORAL HEALTH | Facility: CLINIC | Age: 58
End: 2023-05-30
Payer: COMMERCIAL

## 2023-05-30 DIAGNOSIS — M06.9 RHEUMATOID ARTHRITIS INVOLVING MULTIPLE SITES, UNSPECIFIED WHETHER RHEUMATOID FACTOR PRESENT (H): ICD-10-CM

## 2023-05-30 DIAGNOSIS — F41.1 GAD (GENERALIZED ANXIETY DISORDER): ICD-10-CM

## 2023-05-30 DIAGNOSIS — F33.2 MAJOR DEPRESSIVE DISORDER, RECURRENT SEVERE WITHOUT PSYCHOTIC FEATURES (H): Primary | ICD-10-CM

## 2023-05-30 ASSESSMENT — COLUMBIA-SUICIDE SEVERITY RATING SCALE - C-SSRS
1. SINCE LAST CONTACT, HAVE YOU WISHED YOU WERE DEAD OR WISHED YOU COULD GO TO SLEEP AND NOT WAKE UP?: NO
2. HAVE YOU ACTUALLY HAD ANY THOUGHTS OF KILLING YOURSELF?: NO

## 2023-05-31 ENCOUNTER — MYC MEDICAL ADVICE (OUTPATIENT)
Dept: PSYCHIATRY | Facility: CLINIC | Age: 58
End: 2023-05-31
Payer: COMMERCIAL

## 2023-05-31 DIAGNOSIS — F33.1 MAJOR DEPRESSIVE DISORDER, RECURRENT EPISODE, MODERATE (H): ICD-10-CM

## 2023-05-31 DIAGNOSIS — F41.0 GENERALIZED ANXIETY DISORDER WITH PANIC ATTACKS: ICD-10-CM

## 2023-05-31 DIAGNOSIS — F41.1 GENERALIZED ANXIETY DISORDER WITH PANIC ATTACKS: ICD-10-CM

## 2023-05-31 DIAGNOSIS — F43.10 PTSD (POST-TRAUMATIC STRESS DISORDER): ICD-10-CM

## 2023-05-31 RX ORDER — PRAZOSIN HYDROCHLORIDE 1 MG/1
1 CAPSULE ORAL AT BEDTIME
Qty: 90 CAPSULE | Refills: 0 | Status: SHIPPED | OUTPATIENT
Start: 2023-05-31 | End: 2023-08-22

## 2023-05-31 RX ORDER — QUETIAPINE FUMARATE 25 MG/1
25 TABLET, FILM COATED ORAL AT BEDTIME
Qty: 90 TABLET | Refills: 0 | Status: SHIPPED | OUTPATIENT
Start: 2023-05-31 | End: 2023-08-22

## 2023-05-31 RX ORDER — FLUOXETINE 40 MG/1
80 CAPSULE ORAL DAILY
Qty: 180 CAPSULE | Refills: 0 | Status: SHIPPED | OUTPATIENT
Start: 2023-05-31 | End: 2023-08-22
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
## 2023-05-31 NOTE — TELEPHONE ENCOUNTER
Dr. Corley,    Patient would like refills go to [facility], for 90  Days supply , it is cheaper .    If you approve for 90 days supply, please review and sign.    Shila Solomon on 5/31/2023 at 9:00 AM

## 2023-06-02 ENCOUNTER — THERAPY VISIT (OUTPATIENT)
Dept: PHYSICAL THERAPY | Facility: CLINIC | Age: 58
End: 2023-06-02
Attending: NURSE PRACTITIONER
Payer: COMMERCIAL

## 2023-06-02 DIAGNOSIS — G89.29 CHRONIC BILATERAL LOW BACK PAIN WITHOUT SCIATICA: ICD-10-CM

## 2023-06-02 DIAGNOSIS — M05.79 RHEUMATOID ARTHRITIS INVOLVING MULTIPLE SITES WITH POSITIVE RHEUMATOID FACTOR (H): ICD-10-CM

## 2023-06-02 DIAGNOSIS — M45.7 ANKYLOSING SPONDYLITIS OF LUMBOSACRAL REGION (H): ICD-10-CM

## 2023-06-02 DIAGNOSIS — M25.551 HIP PAIN, RIGHT: ICD-10-CM

## 2023-06-02 DIAGNOSIS — Z79.891 ENCOUNTER FOR LONG-TERM USE OF OPIATE ANALGESIC: ICD-10-CM

## 2023-06-02 DIAGNOSIS — M54.50 CHRONIC BILATERAL LOW BACK PAIN WITHOUT SCIATICA: ICD-10-CM

## 2023-06-02 DIAGNOSIS — M54.59 LUMBAR FACET JOINT PAIN: ICD-10-CM

## 2023-06-02 DIAGNOSIS — M25.50 MULTIPLE JOINT PAIN: ICD-10-CM

## 2023-06-02 DIAGNOSIS — M51.369 DDD (DEGENERATIVE DISC DISEASE), LUMBAR: ICD-10-CM

## 2023-06-02 PROCEDURE — 97162 PT EVAL MOD COMPLEX 30 MIN: CPT | Mod: GP | Performed by: PHYSICAL THERAPIST

## 2023-06-02 PROCEDURE — 97110 THERAPEUTIC EXERCISES: CPT | Mod: GP | Performed by: PHYSICAL THERAPIST

## 2023-06-02 NOTE — PROGRESS NOTES
PHYSICAL THERAPY EVALUATION  Type of Visit: Evaluation    See electronic medical record for Abuse and Falls Screening details.    Subjective      Presenting condition or subjective complaint: back painfor years overall  Date of onset: 05/17/23 (MD referral)    Relevant medical history: Arthritis; Depression; High blood pressure; Mental Illness; Migraines or headaches; Osteoarthritis; Overweight; Pain at night or rest; Rheumatoid arthritis   Dates & types of surgery: hernia 1 year ago;  hernia aas a youth;  tonsil;  3 knee scopes; plantar fascia release; salvary gland removal    Prior diagnostic imaging/testing results: MRI; CT scan; X-ray     Prior therapy history for the same diagnosis, illness or injury: Yes (years ago)      Prior Level of Function   Transfers: Independent  Ambulation: uses cane   ADL: Independent  IADL:     Living Environment  Social support: With a significant other or spouse   Type of home: House; Multi-level   Stairs to enter the home: Yes 4 Is there a railing: Yes   Ramp: No   Stairs inside the home: Yes 12 Is there a railing: Yes   Help at home: None  Equipment owned: Straight Cane     Employment: No    Hobbies/Interests: walk dogs when not in pain    Patient goals for therapy: get up and move without pain    Pain assessment:      Objective   LUMBAR SPINE EVALUATION  PAIN: Pain Level at Rest: 5/10  Pain Level with Use: 9/10  Pain Location: lumbar spine  Pain Quality: Sharp and Shooting  Pain Frequency: constant  Pain is Worst: daytime or nighttime  Pain is Exacerbated By: constant pain;  stairs;  walking  Pain is Relieved By: none  Pain Progression: Unchanged  INTEGUMENTARY (edema, incisions): WNL  POSTURE: Standing Posture: Rounded shoulders, Forward head, Lordosis decreased  GAIT:   Weightbearing Status: WBAT  Assistive Device(s): Cane (single end)  Gait Deviations: Stride length decreased  Clarita decreased  Balance/Proprioception: not assessed  Weight Bearing Alignment: WNL  Non-Weight  Bearing Alignment: WNL   ROM:   (Degrees) Left AROM Left PROM  Right AROM Right PROM   Hip Flexion       Hip Extension       Hip Abduction       Hip Adduction       Hip Internal Rotation       Hip External Rotation       Knee Flexion       Knee Extension       Lumbar Side glide     Lumbar Flexion Hands to thighs   Lumbar Extension neutral     Bilat ROT decreased 50%    Pain:   End feel:     PELVIC/SI SCREEN:   Strength: fair trunk strength    MYOTOMES: normal with pain to LB with testing bilat  DTR S: WNL  CORD SIGNS:   DERMATOMES: WNL  NEURAL TENSION:    Left Right   SLR Negative  Negative    SLR with DF Negative  Negative    Femoral Nerve     Slump Negative  Negative    Enoc (Lumbar)     Enoc (Thoracic)     Enoc (Cervical)     Median     Ulnar     Radial        FLEXIBILITY: tight HS bilat  LUMBAR/HIP Special Tests:    Left Right   ANA Negative  + to LB   FADIR/Labrum/PATRICK Negative  Negative    Femoral Nerve     Arely's     Piriformis     Quadrant Testing     SLR negative - tight HS at 45 degrees negative - tight HS at 45 degrees   Slump Negative  Negative    Stork with Extension     Haim             PELVIS/SI SPECIAL TESTS:   FUNCTIONAL TESTS:   PALPATION:   + Tenderness At Location Left Right   Quadratus Lumborum + +   Erector Spinae + +   Piriformis  - -   PSIS     ASIS     Iliac Crest     Glut Medius     Greater Trochanter     Ischial Tuberosity     Hamstrings     Hip Flexors     Vertebral        SPINAL SEGMENTAL CONCLUSIONS: not assessed      Assessment & Plan   CLINICAL IMPRESSIONS   Medical Diagnosis:      Treatment Diagnosis:     Impression/Assessment: Patient is a 57 year old male with LB complaints.  The following significant findings have been identified: Pain, Decreased ROM/flexibility, Decreased joint mobility, Decreased strength, Impaired gait and Impaired muscle performance. These impairments interfere with their ability to perform self care tasks, recreational activities, household chores, driving  , household mobility and community mobility as compared to previous level of function.     Clinical Decision Making (Complexity):   Clinical Presentation: Evolving/Changing  Clinical Presentation Rationale: based on medical and personal factors listed in PT evaluation  Clinical Decision Making (Complexity): Moderate complexity    PLAN OF CARE  Treatment Interventions:  Interventions: Manual Therapy, Neuromuscular Re-education, Therapeutic Activity, Therapeutic Exercise    Long Term Goals            Frequency of Treatment:    Duration of Treatment:      Recommended Referrals to Other Professionals:   Education Assessment:        Risks and benefits of evaluation/treatment have been explained.   Patient/Family/caregiver agrees with Plan of Care.     Evaluation Time:            Signing Clinician: Haim Esquivel, PT      [facility]                                                                                   OUTPATIENT PHYSICAL THERAPY      PLAN OF TREATMENT FOR OUTPATIENT REHABILITATION   Patient's Last Name, First Name, Jailene Washington YOB: 1965   Provider's Name   [facility]   Medical Record No.  [id number]     Onset Date: 05/17/23 (MD referral)  Start of Care Date:       Medical Diagnosis:         PT Treatment Diagnosis:    Plan of Treatment  Frequency/Duration:  /      Certification date from   to           See note for plan of treatment details and functional goals     Haim Esquivel, PT                         I CERTIFY THE NEED FOR THESE SERVICES FURNISHED UNDER        THIS PLAN OF TREATMENT AND WHILE UNDER MY CARE     (Physician attestation of this document indicates review and certification of the therapy plan).                  Referring Provider:  Susana Pike      Initial Assessment  See Epic Evaluation-

## 2023-06-05 ENCOUNTER — TELEPHONE (OUTPATIENT)
Dept: PALLIATIVE MEDICINE | Facility: CLINIC | Age: 58
End: 2023-06-05

## 2023-06-05 DIAGNOSIS — M62.838 MUSCLE SPASM: ICD-10-CM

## 2023-06-05 NOTE — TELEPHONE ENCOUNTER
Received fax from pharmacy requesting refill(s) for     tiZANidine (ZANAFLEX) 4 MG tablet    Date last filled 05/07/2023    Last Appt Date:05/17/2023    Next Appt scheduled: 08/09/2023    Pharmacy:   [facility] - [place], [place] - [address],    [facility] route for processing    Ene Kincaid MA  [facility] [place]   
Signed Prescriptions:                        Disp   Refills    tiZANidine (ZANAFLEX) 4 MG tablet          45 tab*1        Sig: Take 1 tablet (4 mg) by mouth 3 times daily as needed           for muscle spasms  Authorizing Provider: TAMICA PETTY, RN CNP, FNP  [facility]  [facility]    
no

## 2023-06-05 NOTE — TELEPHONE ENCOUNTER
Date 6/5/23    [employer] application spoke to pt-  He has not started application but is going to try to get to it this week        BONG Pressley, [facility]  Specialty Pharmacy Clinic Liaison     [facility] Specialty    [email or web address]     Phone: [phone]  Fax: [phone]

## 2023-06-07 ENCOUNTER — VIRTUAL VISIT (OUTPATIENT)
Dept: BEHAVIORAL HEALTH | Facility: CLINIC | Age: 58
End: 2023-06-07
Payer: COMMERCIAL

## 2023-06-07 DIAGNOSIS — F41.1 GAD (GENERALIZED ANXIETY DISORDER): ICD-10-CM

## 2023-06-07 DIAGNOSIS — F33.2 MAJOR DEPRESSIVE DISORDER, RECURRENT SEVERE WITHOUT PSYCHOTIC FEATURES (H): Primary | ICD-10-CM

## 2023-06-07 ASSESSMENT — COLUMBIA-SUICIDE SEVERITY RATING SCALE - C-SSRS
2. HAVE YOU ACTUALLY HAD ANY THOUGHTS OF KILLING YOURSELF?: NO
1. SINCE LAST CONTACT, HAVE YOU WISHED YOU WERE DEAD OR WISHED YOU COULD GO TO SLEEP AND NOT WAKE UP?: NO
2. HAVE YOU ACTUALLY HAD ANY THOUGHTS OF KILLING YOURSELF?: NO
1. HAVE YOU WISHED YOU WERE DEAD OR WISHED YOU COULD GO TO SLEEP AND NOT WAKE UP?: NO

## 2023-06-07 NOTE — PROGRESS NOTES
Transition clinic                                    Progress Note    Patient Name: Jailene Breen  Date: 2023         Service Type: Individual      Session Start Time: 1305  Session End Time: 1355     Session Length: 50    Session #: 2    Attendees: Client attended alone    Service Modality:  Video Visit:      Provider verified identity through the following two step process.  Patient provided:  Patient photo, Patient  and Patient address    Telemedicine Visit: The patient's condition can be safely assessed and treated via synchronous audio and visual telemedicine encounter.      Reason for Telemedicine Visit: Patient has requested telehealth visit    Originating Site (Patient Location): Patient's home    Distant Site (Provider Location): Provider Remote Setting- Home Office    Consent:  The patient/guardian has verbally consented to: the potential risks and benefits of telemedicine (video visit) versus in person care; bill my insurance or make self-payment for services provided; and responsibility for payment of non-covered services.     Patient would like the video invitation sent by:  My Chart    Mode of Communication:  Video Conference via [employer]    Distant Location (Provider):  Off-site    As the provider I attest to compliance with applicable laws and regulations related to telemedicine.    DATA  Interactive Complexity: No  Crisis: No        Progress Since Last Session (Related to Symptoms / Goals / Homework):   Symptoms: No change continued depression    Homework: Completed in session      Episode of Care Goals: Satisfactory progress - CONTEMPLATION (Considering change and yet undecided); Intervened by assessing the negative and positive thinking (ambivalence) about behavior change     Current / Ongoing Stressors and Concerns:   Patient presented with continued depression and anxiety.  This session patient continued to share life events that haunt him and leave him feeling insecure and  inadequate. Patient verbalized his need to process recent events and resulting thoughts and feelings. Thoughts of past experiences exacerbate symptoms of depression and anxiety. Patient and provider discussed possible changes patient can try to current efforts of behavioral change. Provider suggested CBT tech to challenge negative self defeating thoughts.  Clinician provided support and encouragement.     Patient practiced self soothing with 5 senses; distress tolerance skills,over the past week.      Treatment Objective(s) Addressed in This Session:   identify past auto thoughts and  fears / thoughts that contribute to feeling anxious  Decrease frequency and intensity of feeling down, depressed, hopeless     Intervention:   CBT: Identtify, journal and challenge negative thoughts and emotions.   Solution Focused: Solution Focused: Instruct patient on skills and activities to help patient manage symptoms.     The following assessments were completed by patient for this visit:  [place] Suicide Severity Rating Scale (Short Version)      11/18/2021     9:00 AM 12/2/2021     9:00 AM 3/31/2022    10:00 AM 5/2/2022    12:30 PM 5/2/2022     2:34 PM 5/30/2023    10:00 PM 6/7/2023     1:00 PM   [place] Suicide Severity Rating (Short Version)   Over the past 2 weeks have you felt down, depressed, or hopeless? yes yes  yes      Over the past 2 weeks have you had thoughts of killing yourself? no no  no      Have you ever attempted to kill yourself? no no  yes      When did this last happen?    more than 6 months ago      Q1 Wished to be Dead (Past Month)   yes  yes     Q2 Suicidal Thoughts (Past Month)   yes  no     Q3 Suicidal Thought Method   no  no     Q4 Suicidal Intent without Specific Plan   no  no     Q5 Suicide Intent with Specific Plan   no  no     Q6 Suicide Behavior (Lifetime)   yes  no     Within the Past 3 Months?   no       Level of Risk per Screen   moderate risk  low risk     Required Interventions     Room  searched;Patient searched     1. Wish to be Dead (Since Last Contact)      N N   2. Non-Specific Active Suicidal Thoughts (Since Last Contact)      N N   Has subject engaged in non-suicidal self-injurious behavior? (Since Last Contact)      N N   Calculated C-SSRS Risk Score (Since Last Contact)      No Risk Indicated No Risk Indicated      Suicidal Ideation  1. Wish to be Dead (Lifetime): No  2. Non-Specific Active Suicidal Thoughts (Lifetime): No     Suicidal Behavior  Has subject engaged in non-suicidal self-injurious behavior? (Lifetime): No     [place] Suicide Severity Rating Scale Since Last Contact:   Suicidal Ideation (Since Last Contact)  1. Wish to be Dead (Since Last Contact): No  2. Non-Specific Active Suicidal Thoughts (Since Last Contact): No  Suicidal Behavior (Since Last Contact)  Has subject engaged in non-suicidal self-injurious behavior? (Since Last Contact): No     C-SSRS Risk (Since Last Contact)  Calculated C-SSRS Risk Score (Since Last Contact): No Risk Indicated    Validity of evaluation is not impacted by presenting factors during interview .   Comments regarding subjective versus objective responses to [place] tool: Patient has a history of SI off and on. He denies current SI.   Environmental or Psychosocial Events: loss of a loved one, loss of status/respect/rank, challenging interpersonal relationships, unemployment/underemployment and other: parental abandonment/confusion with parent  Chronic Risk Factors: chronic health problems, history of attachment issues and parental mental health issue, family grief and loss  Warning Signs: seeking access to means to hurt or kill self, talking or writing about death, dying, or suicide, feeling trapped, like there is no way out, withdrawing from friends, family, and society, anxiety, agitation, unable to sleep, sleeping all the time, dramatic changes in mood, engaging in self-destructive behavior and recent losses (physical, financial,  personal)  Protective Factors: strong bond to family unit, community support, or employment, responsibilities and duties to others, including pets and children, able to access care without barriers and help seeking  Interpretation of Risk Scoring, Risk Mitigation Interventions and Safety Plan: currently no risk      ASSESSMENT: Current Emotional / Mental Status (status of significant symptoms):   Risk status (Self / Other harm or suicidal ideation)   Patient denies current fears or concerns for personal safety.   Patient denies current or recent suicidal ideation or behaviors.   Patient denies current or recent homicidal ideation or behaviors.   Patient denies current or recent self injurious behavior or ideation.   Patient denies other safety concerns.   Patient reports there has been no change in risk factors since their last session.     Patient reports there has been no change in protective factors since their last session.     Recommended that patient call 911 or go to the local ED should there be a change in any of these risk factors.     Appearance:   Appropriate    Eye Contact:   Good    Psychomotor Behavior: Normal    Attitude:   Cooperative    Orientation:   Person Place Time Situation   Speech    Rate / Production: Talkative    Volume:  Normal    Mood:    Anxious  Depressed    Affect:    Blunted    Thought Content:  Clear    Thought Form:  Coherent  Goal Directed  Logical    Insight:    Fair      Medication Review:   No changes to current psychiatric medication(s)     Medication Compliance:   Yes     Changes in Health Issues:   None reported     Chemical Use Review:   Substance Use: Chemical use reviewed, no active concerns identified      Tobacco Use: No current tobacco use.      Diagnosis:  1. Major depressive disorder, recurrent severe without psychotic features (H)    2. JEFF (generalized anxiety disorder)        Collateral Reports Completed:   Routed note to PCP    PLAN: (Patient Tasks / Therapist  Tasks / Other)  Read website [email or web address]://[email or web address].com/cbt/ material on CBT, journal auto negative thoughts and emotions.  Use skills of mindfulness and 5 senses for grounding to reduce anxiety.  Return in 2 weeks.      Lela Smith, [facility]                                                    ______________________________________________________________________

## 2023-06-13 NOTE — TELEPHONE ENCOUNTER
Date 06/13/2023      Spoke to pt he is going to bring it by clinic on Friday to send in      BONG Pressley, [facility]  Specialty Pharmacy Clinic Liaison     [facility] Specialty    [email or web address]     Phone: [phone]  Fax: [phone]

## 2023-06-15 NOTE — PROGRESS NOTES
"Virtual Visit Details    Type of service:  Video Visit   Video Start Time: 10:30 AM  Video End Time:11:15 AM    Originating Location (pt. Location): Home    Distant Location (provider location):  On-site  Platform used for Video Visit: [facility]  Psychiatry Clinic  MEDICAL PROGRESS NOTE     CARE TEAM:  PCP- Aiden Resendez    Psychotherapist- None       This person is a 57 year old who uses the name Jamison and pronouns he, him, his.      DIAGNOSIS     Major depressive disorder, recurrent, moderate  Generalized anxiety disorder, with panic  PTSD     Medical Diagnoses:  Chronic pain  Rheumatoid arthritis  Ankylosing spondylitis ([id number] positive)  Lumbar degenerative disc disease  Hypertension  Hyperlipidemia     ASSESSMENT     Mr. Breen reports improvement in his mood, anhedonia, and sedation. He noted that the recent medication changes were helpful; decreasing quetiapine helped with sedation, and restarting prazosin improved sleep. However, he still struggles with creating a bedtime routine and sometimes forgets to take night medications after falling asleep on the couch. He shared that he has been doing better with self-care. He recently worked on cleaning his deck and enjoyed doing yard work with his wife. Patient's depressive symptoms often fluctuate with triggering effects from his life stressors. His tumultuous and hurtful relationship with his mother has been causing him considerable emotional distress; he shared that his mother does not want to see or talk to him. He says he \"thinks about this every day.\" He started psychotherapy on 5/30/2023 and had two sessions. He states that \"talking with someone\" has been helpful.     Pain remains a significant contributor to his mood symptoms. He had an appointment with the pain clinic on 5/17; he is on suboxone and, medical cannabis, oxycodone (last refill for 5 mg x 42 tablets on 5/17/2023) only for pain flare short " "term. Patient states that he has not been taking it currently. Rheumatology is following him, and he may start certolizumab pegol if he gets medication financial assistance.    He is distressed about not having a \"relationship with his wife\" for over a year. He understands that his antidepressant could contribute to this issue due to potential sexual side effects. He agreed to check in with his PCP to rule out organic causes for sexual dysfunction. We discussed his recent MTM visit and the potential benefits of switching his antidepressant once established with his new resident psychiatrist. He is reluctant to change antidepressants after switching from fluoxetine to mirtazapine but is open to other recommendations.     Per MTM completed on 5/15/2023:   - Switching to SNRIs might have an additional benefit for pain (maybe retrial with duloxetine due to vague response in the past, or possibly levomilnacipran, depending on insurance coverage).     He has been taking liothyronine for the past two years off-label for antidepressant augmentation; given that the duration of treatment and long term safety have not been well studied, we discussed today decreasing his dose from 25 mg to 12.5 mg and monitoring his depressive symptoms. His last TSH was 1.0 (wnl) on 12/2022. Patient agreed with this plan.     Patient is aware of the weight management referral from his PCP. Monitoring weight changes.     Denies any change in substance use.     He denies suicidal ideation, intent, or plan today; he denies any safety concerns.    The patient understands the risks, benefits, adverse effects, and alternatives. Agrees to treatment with the capacity to do so. No medical contraindications to treatment. Agrees to call the clinic for any problems. The patient understands to call 911 or come to the nearest ED if life-threatening or urgent symptoms present.    [place] was [place] today:  Indicates taking controlled medication as " "prescribed.     PLAN                                                                                                                1) Medications:   - Continue fluoxetine (PROZAC) 80 mg daily  - Continue quetiapine (SEROQUEL) 25 mg at bedtime  - Decrease liothyronine (CYTOMEL) from 25 mcg daily to 12.5 mcg daily   - Continue prazosin (MINIPRESS) 1 mg     Other:  - Suboxone  - Methotrexate  - Oxycodone (per pain clinic for pain flare short term)  - Medical cannabis (per pain clinic)     2) Therapy- Recommended. Going well.     3) Next Due   Labs - AP labs last ordered on 6/15/2023  EKG - last EKG 12/8/2022.  Rating scales- AIMS     4) Referral -   - Patient will follow up sleep medicine      5) RTC: 4 weeks (RN check-in on one week)     6) Crisis Numbers:   Provided routinely in AVS   After hours:  [phone]     PERTINENT BACKGROUND                                                    [most recent eval 07/13/22]     Originally, diagnosed with depression and anxiety in 2015 but first experienced symptoms of depression in middle school. He most recently had a significant worsening of symptoms in 2017 after he was diagnosed with rheumatoid arthritis, ankylosing spondylitis ended up with a knee injury and was no longer able to work or run which he used as his primary coping mechanism.    Pertinent Items Include: suicidal ideation, SIB [cutting], multiple psychotropic trials , severe med reaction, psych hosp (<3), SUBSTANCE USE: alcohol, substance use treatment  and Major Medical Problems (Rheumatoid Arthritis and Ankylosing Spondylitis)     SUBJECTIVE     - \"Doing OK.\" Feeling better since the last visit.  - Started therapy, two sessions so far.   - Therapy is helpful, \"I have a lot of things to work out.\"   - He has been working on \"remaining calm against triggers, understanding what I can control.\"   - Rates depression 7/10 (10 being the worst); \"my baseline is 5.\"   - Denies anxiety   - Denies any change in " "substance use.   - PCP is following up on metabolic issues  - Denies suicidal ideation, intent, or plan today; denies any safety concerns.       Recent Psych Symptoms:   Depression:  low energy, poor concentration /memory and feeling worthless, better since last visit.  Elevated:  none  Psychosis:  none  Anxiety:  improved, no panic attacks since the last visit  Trauma Related:  nightmares, flashbacks, negative beliefs / emotions and hypervigilance, nightmares about child trauma,  Sleep: \"Better\" restarting prazosin helped.     Adverse Effects:  denies  Pertinent Negative Symptoms: No suicidal ideation, violent ideation, aggression, psychosis, hallucinations, delusions, otto and hypomania    Recent Substance Use:     Alcohol- none since , used to attend AA meetings prior to COVID, no plans to start  Tobacco- denies  Caffeine- no,    Cannabis- denies     Opioids- as prescribed           Narcan Kit- prescribed   Other illicit drugs- none     FAMILY and SOCIAL HISTORY                                 pt reported     Family Hx:  Mom - depression (since  when sister )     Social Hx:  Financial/ Work- planning to attend [employer] for a degree in psychology; unable to work due to diagnosis of rheumatoid arthritis and ankylosing spondylitis, on disability   Partner/ -  in   Children- 26 and 28 year old  Living situation- [place], house with wife and daughter 25 yo at home, son is in [place], three dogs    Social/ Spiritual Support- Talks to  every two weeks, not many friends, family is supportive, likes walking and hiking     PAST PSYCHIATRIC HISTORY     SIB- Hx of cutting/carving of skin   Suicide Attempt [#, most recent]- No   Suicidal Ideation Hx- No   Violence/Aggression Hx- No, not now  Psychosis Hx- No   Eating Disorder Hx- No   Psych Hosp [#, most recent]- Yes, 3-4 times, 10 days longest  Commitment- No   TMS/ECT- No   Outpatient Programs - Yes, CD treatment at [facility], " "in patient and outpatient program     PAST MED TRIALS       Medication  Dose   (mg) Effect  Dates of Use   Fluoxetine 40-80 Felt like was initially helpful 2016 - 2017 2022-2023   Sertraline 100 Effective initially, eventually self discontinued as not helpful 2018 - 7/2020   Duloxetine 30 BID Used to treat nerve pain; felt weird on it 2017   Bupropion  BID ineffective 2017 - 2019   Nortriptyline 50 For pain 2017 -  3/2019   Mirtazapine   Dissociated for entire first week after taking  Per MTM on 5/15/23: \"\"some kind of psychosis\". States that he was also taking oxycodone, alprazolam, fentanyl patches at the time\" 2012   Trazodone 50   2013             Hydroxyzine 25 QID prn For anxiety and panic attacks; not effective for severe anxiety 2015 - 2017   Buspirone 15 TID   2016 - 2017   Gabapentin 100 TID   2013 - 2016   Alprazolam 0.5 TID For anxiety 6292-0175   Diazepam 2 BID For anxiety 2016   Lorazepam 1 TID For anxiety 2016   Quetiapine  25-50  [id number]   Clonazepam 1 For anxiety 2016        PSYCH CRITICAL SUMMARY POINTS         7/14/2022: Transfer visit.  Changed prazosin from PRN to scheduled at bedtime  11/3/2022: No medication changes. Elevated BP history discussed. Patient agreed to make a PCP appointment.   12/1/2022: No medication changes.   1/5/2023:  Increased prazosin to 2 mg at bedtime  2/9/2023:  Return of depressive symptoms after her daughter moved out from the family home. Increased fluoxetine from 60 mg daily to 80 mg daily.  3/2/2023:  Patient self-discontinued prazosin 2 mg due sedation.  No other changes to medications. Depression improved.   3/30/2023: Doing well. No medication changes.   5/11/2023: Reported sleep issues, nightmares, daytime sedation. Decreased quetiapine 50 mg at bedtime to 25 mg at bedtime (due to sedation)  Discontinued quetiapine 25-50 mg as needed for anxiety and panic (was rarely taking). Started prazosin 1 mg for sleep/nightmares.  6/16/2023: Decrease liothyronine " (CYTOMEL) from 25 mcg daily to 12.5 mcg daily.      MEDICAL HISTORY and ALLERGY     ALLERGIES: Mirtazapine, Hydrocodone, Mirtazapine, and Ms contin [morphine]    Patient Active Problem List   Diagnosis     CARDIOVASCULAR SCREENING; LDL GOAL LESS THAN 160     Obesity, Class I, BMI 30-34.9     Tear meniscus knee, right, initial encounter     Rheumatoid arthritis involving multiple sites, unspecified rheumatoid factor presence     Chronic pain     Right-sided thoracic back pain, unspecified chronicity     Generalized anxiety disorder     Panic attack     Ankylosing spondylitis (H)     Moderate major depression (H)     Immunocompromised (H)     Essential hypertension with goal blood pressure less than 140/90     Suicidal ideation     Insomnia due to other mental disorder     Major depressive disorder, recurrent episode, severe with mixed features (H)     Chronic back pain greater than 3 months duration     Contact dermatitis and eczema     Dermatitis     Drug dependence (H)     Encounter for screening colonoscopy     Esophageal reflux     Hematuria     Hyperlipidemia     Lumbar radiculopathy     Plantar fascial fibromatosis     Sprain of sacroiliac region     Overweight     Nonintractable migraine     Narcotic abuse, continuous (H)     Morbid obesity (H)     Chronic bilateral low back pain without sciatica        MEDICAL REVIEW OF SYSTEMS     Contraception- none    A comprehensive review of systems was performed and is negative other than noted in the HPI.      MEDICATIONS     Current Outpatient Medications   Medication Sig Dispense Refill     liothyronine (CYTOMEL) 25 MCG tablet Take 0.5 tablets (12.5 mcg) by mouth daily 45 tablet 0     Abatacept (ORENCIA) 125 MG/ML SOAJ auto-injector Inject 1 mL (125 mg) Subcutaneous every 7 days Hold for signs of infection, and seek medical attention. 4 mL 1     atorvastatin (LIPITOR) 20 MG tablet Take 1 tablet (20 mg) by mouth daily 90 tablet 0     buprenorphine (SUBUTEX) 8 MG SUBL  sublingual tablet Place 1 tablet (8 mg) under the tongue 2 times daily Max 2 tabs per day. Fill 06/01/23 to start 06/03/23 60 tablet 0     certolizumab pegol (CIMZIA) 2 X 200 MG/ML PSKT 2 syringes/kit Inject 2 mLs (400 mg) Subcutaneous every 30 days Hold for signs of infection, and seek medical attention. 2 each 3     FLUoxetine (PROZAC) 40 MG capsule Take 2 capsules (80 mg) by mouth daily 180 capsule 0     folic acid (FOLVITE) 1 MG tablet Take 3 tablets (3 mg) by mouth daily 270 tablet 2     medical cannabis (Patient's own supply) See Admin Instructions (The purpose of this order is to document that the patient reports taking medical cannabis.  This is not a prescription, and is not used to certify that the patient has a qualifying medical condition.)     Pills PRN   Lozenges PRN       methotrexate 2.5 MG tablet Take 8 tablets (20 mg) by mouth every 7 days 96 tablet 0     oxyCODONE (ROXICODONE) 5 MG tablet Take 1 tablet (5 mg) by mouth every 6 hours as needed for severe pain Max of 3/day. Fill/begin 05/17/23. Short term script for acute pain flare. 42 tablet 0     prazosin (MINIPRESS) 1 MG capsule Take 1 capsule (1 mg) by mouth At Bedtime 90 capsule 0     QUEtiapine (SEROQUEL) 25 MG tablet Take 1 tablet (25 mg) by mouth At Bedtime 90 tablet 0     tiZANidine (ZANAFLEX) 4 MG tablet Take 1 tablet (4 mg) by mouth 3 times daily as needed for muscle spasms 45 tablet 1      VITALS   There were no vitals taken for this visit.     Pulse Readings from Last 5 Encounters:   05/17/23 69   12/08/22 89   11/23/22 72   09/22/22 66   08/31/22 65     Wt Readings from Last 5 Encounters:   12/08/22 131.5 kg (290 lb)   05/02/22 122.7 kg (270 lb 8 oz)   03/16/22 125.3 kg (276 lb 3.2 oz)   02/16/22 122.5 kg (270 lb)   12/30/21 123 kg (271 lb 2 oz)     BP Readings from Last 5 Encounters:   05/17/23 (!) 149/92   12/08/22 133/85   11/23/22 138/85   09/22/22 (!) 176/88   08/31/22 (!) 154/95        MENTAL STATUS EXAM     Alertness: alert  and  "oriented  Appearance: casually groomed, limited obs by video visit  Behavior/Demeanor: cooperative, pleasant and calm, with poor eye contact   Speech: normal and regular rate and rhythm  Language: intact and no problems  Psychomotor: normal or unremarkable and based on video visit, no abnormal moveemnts or tics were observed  Mood: \"doing ok\"   Affect: guarded; congruent to: mood- yes, content- yes  Thought Process/Associations: unremarkable  Thought Content:  Reports none;  Denies suicidal & violent ideation and delusions, denies any plan or intent  Perception:  Reports none;  Denies auditory hallucinations and visual hallucinations  Insight: good  Judgment: fair  Cognition: does  appear grossly intact; formal cognitive testing was not done  Gait and Station: N/A ([facility])     LABS and DATA         3/30/2023     8:40 AM 5/11/2023     8:41 AM 6/12/2023     8:04 PM   PHQ   PHQ-9 Total Score 11 12 15   Q9: Thoughts of better off dead/self-harm past 2 weeks Not at all More than half the days Not at all   F/U: Thoughts of suicide or self-harm  No    F/U: Safety concerns  No        Recent Labs   Lab Test 05/17/23 1007 12/08/22  1425   CR 0.84 0.98   GFRESTIMATED >90 >90     Recent Labs   Lab Test 05/17/23 1007 12/08/22  1425 08/31/22  1405 10/11/21  1123   AST 30 32   < > 37   ALT 48 49   < > 47   ALKPHOS  --  112  --  102    < > = values in this interval not displayed.       Recent Labs   Lab Test 12/08/22  1427 12/08/22  1425 10/11/21  1123   GLC  --  96 97   A1C 5.5  --  5.2     Recent Labs   Lab Test 12/08/22  1425 10/11/21  1123   CHOL 224* 209*   TRIG 230* 225*   * 134*   HDL 25* 30*     Recent Labs   Lab Test 05/17/23 1007 12/08/22  1425 06/16/21  0741 03/21/21  0808 03/17/21  0748   WBC 6.4 7.7   < > 6.8 8.3   ANEU  --   --   --  3.7 4.4   HGB 15.5 15.6   < > 15.6 15.0    225   < > 246 211    < > = values in this interval not displayed.     TSH   Date Value Ref Range Status   12/08/2022 1.19 " 0.40 - 4.00 mU/L Final   05/02/2022 1.46 0.40 - 4.00 mU/L Final   10/11/2021 1.37 0.40 - 4.00 mU/L Final   06/16/2021 0.65 0.40 - 4.00 mU/L Final   03/21/2021 2.39 0.40 - 4.00 mU/L Final   03/17/2021 2.01 0.40 - 4.00 mU/L Final   09/30/2016 1.62 0.40 - 4.00 mU/L Final     ECG 6/15/2021 QTc = 413 ms    EKG on 12/8/2022 QTc 403 ms  Sinus  Rhythm   -Incomplete right bundle branch block.    -Left atrial enlargement.      PSYCHOTROPIC DRUG INTERACTIONS                                                       PSYCHCLINICDDI     Increased risk of QTc prolongation: fluoxetine, quetiapine, Suboxone  Increased risk of serotonin syndrome: fluoxetine, Suboxone  Increased risk of CNS/respiratory depression: Suboxone, quetiapine, oxycodone   Increased risk of hypotension: prazosin, buprenorphine    MANAGEMENT:  Monitoring for adverse effects and patient is aware of risks     RISK STATEMENT for SAFETY     Mr. Breen did not appear to be an imminent safety risk to self or others.     TREATMENT RISK STATEMENT: The risks, benefits, alternatives and potential adverse effects have been discussed and are understood by the pt. The pt understands the risks of using street drugs or alcohol. There are no medical contraindications, the pt agrees to treatment with the ability to do so. The pt knows to call the clinic for any problems or to access emergency care if needed.  Medical and substance use concerns are documented above.  Psychotropic drug interaction check was done, including changes made today.     PROVIDER: Katie Corley MD, PhD    Patient staffed in clinic with Dr. Downing who will sign the note.  Supervisor is Dr. Hunter.

## 2023-06-16 ENCOUNTER — VIRTUAL VISIT (OUTPATIENT)
Dept: PSYCHIATRY | Facility: CLINIC | Age: 58
End: 2023-06-16
Attending: PSYCHIATRY & NEUROLOGY
Payer: COMMERCIAL

## 2023-06-16 DIAGNOSIS — F41.1 GENERALIZED ANXIETY DISORDER WITH PANIC ATTACKS: ICD-10-CM

## 2023-06-16 DIAGNOSIS — F41.0 GENERALIZED ANXIETY DISORDER WITH PANIC ATTACKS: ICD-10-CM

## 2023-06-16 DIAGNOSIS — F33.1 MAJOR DEPRESSIVE DISORDER, RECURRENT EPISODE, MODERATE (H): Primary | ICD-10-CM

## 2023-06-16 DIAGNOSIS — Z79.899 ENCOUNTER FOR LONG-TERM (CURRENT) USE OF MEDICATIONS: ICD-10-CM

## 2023-06-16 DIAGNOSIS — Z51.81 ENCOUNTER FOR THERAPEUTIC DRUG MONITORING: ICD-10-CM

## 2023-06-16 PROCEDURE — 99214 OFFICE O/P EST MOD 30 MIN: CPT | Mod: GC | Performed by: STUDENT IN AN ORGANIZED HEALTH CARE EDUCATION/TRAINING PROGRAM

## 2023-06-16 RX ORDER — LIOTHYRONINE SODIUM 25 UG/1
25 TABLET ORAL DAILY
Qty: 90 TABLET | Refills: 0 | Status: SHIPPED | OUTPATIENT
Start: 2023-06-16 | End: 2023-06-16

## 2023-06-16 RX ORDER — LIOTHYRONINE SODIUM 25 UG/1
12.5 TABLET ORAL DAILY
Qty: 45 TABLET | Refills: 0 | Status: SHIPPED | OUTPATIENT
Start: 2023-06-16 | End: 2023-08-04

## 2023-06-16 NOTE — NURSING NOTE
Is the patient currently in the state of [place]? YES    Visit mode:VIDEO    If the visit is dropped, the patient can be reconnected by: VIDEO VISIT: Text to cell phone: [phone]    Will anyone else be joining the visit? NO      How would you like to obtain your AVS? MyChart    Are changes needed to the allergy or medication list? NO    Reason for visit: RECHECK

## 2023-06-20 NOTE — TELEPHONE ENCOUNTER
Date 6/20/2023    Checking in with clinic to see if they received forms from pt    BONG Pressley, [facility]  Specialty Pharmacy Clinic Liaison     [facility]    [email or web address]     Phone: [phone]  Fax: [phone]

## 2023-06-21 ENCOUNTER — VIRTUAL VISIT (OUTPATIENT)
Dept: BEHAVIORAL HEALTH | Facility: CLINIC | Age: 58
End: 2023-06-21
Payer: COMMERCIAL

## 2023-06-21 DIAGNOSIS — F41.1 GAD (GENERALIZED ANXIETY DISORDER): ICD-10-CM

## 2023-06-21 DIAGNOSIS — F33.2 MAJOR DEPRESSIVE DISORDER, RECURRENT SEVERE WITHOUT PSYCHOTIC FEATURES (H): Primary | ICD-10-CM
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
## 2023-06-26 ENCOUNTER — PATIENT OUTREACH (OUTPATIENT)
Dept: PSYCHIATRY | Facility: CLINIC | Age: 58
End: 2023-06-26
Payer: COMMERCIAL

## 2023-06-26 NOTE — PROGRESS NOTES
Called to check in on patient per request of Dr. Corley.  Patient recently decreased liothyronine from 25 mcg to 12.5 mcg.  Patient states he has not noticed any difference.  He reports no change in mood.  He denies any concerns at this time and was instructed to call the clinic if any changes.  He agrees.  Update sent to provider.

## 2023-06-29 NOTE — TELEPHONE ENCOUNTER
DATE 6/29/2023    Spoke to pt on the phone he asked that we send the application via [employer] again    Sent message with instructions to pt      BOGN Pressley, [facility]  Specialty Pharmacy Clinic Liaison     [facility] Specialty    [email or web address]     Phone: [phone]  Fax: [phone]

## 2023-07-03 ENCOUNTER — MYC MEDICAL ADVICE (OUTPATIENT)
Dept: PALLIATIVE MEDICINE | Facility: CLINIC | Age: 58
End: 2023-07-03
Payer: COMMERCIAL

## 2023-07-03 DIAGNOSIS — M51.369 DDD (DEGENERATIVE DISC DISEASE), LUMBAR: ICD-10-CM

## 2023-07-03 DIAGNOSIS — F11.20 UNCOMPLICATED OPIOID DEPENDENCE (H): ICD-10-CM

## 2023-07-03 DIAGNOSIS — M25.551 HIP PAIN, RIGHT: ICD-10-CM

## 2023-07-03 DIAGNOSIS — M45.7 ANKYLOSING SPONDYLITIS OF LUMBOSACRAL REGION (H): ICD-10-CM

## 2023-07-03 DIAGNOSIS — M25.50 MULTIPLE JOINT PAIN: ICD-10-CM

## 2023-07-03 DIAGNOSIS — M54.59 LUMBAR FACET JOINT PAIN: ICD-10-CM

## 2023-07-03 DIAGNOSIS — F11.90 CHRONIC, CONTINUOUS USE OF OPIOIDS: ICD-10-CM

## 2023-07-03 DIAGNOSIS — M05.79 RHEUMATOID ARTHRITIS INVOLVING MULTIPLE SITES WITH POSITIVE RHEUMATOID FACTOR (H): ICD-10-CM

## 2023-07-03 NOTE — TELEPHONE ENCOUNTER
Patient requesting refills of  buprenorphine.  And oxycodone     To be sent to [facility] pharmacy in [place]

## 2023-07-05 NOTE — TELEPHONE ENCOUNTER
Received refill request for:      buprenorphine (SUBUTEX) 8 MG SUBL sublingual tablet    Last dispensed from pharmacy on 6/3/2023.    oxyCODONE (ROXICODONE) 5 MG tablet      Last dispensed from pharmacy on 5/17/2023.    Patient's last office/virtual visit by prescribing provider on 5/17/2023.    Next office/virtual appointment scheduled for 8/9/2023.    Last urine drug screen date 5/17/2023.    Current opioid agreement on file? Yes Date of opioid agreement: 5/17/2023.    E-prescribe to:    [id number] - [place], [place] - [address] AT [address] [facility] [address] [facility]    Will route to nursing [place] for review and preparation of prescription(s).

## 2023-07-06 RX ORDER — BUPRENORPHINE 8 MG/1
8 TABLET SUBLINGUAL 2 TIMES DAILY
Qty: 60 TABLET | Refills: 0 | Status: SHIPPED | OUTPATIENT
Start: 2023-07-06 | End: 2023-08-01

## 2023-07-06 RX ORDER — OXYCODONE HYDROCHLORIDE 5 MG/1
5 TABLET ORAL EVERY 6 HOURS PRN
Qty: 42 TABLET | Refills: 0 | Status: SHIPPED | OUTPATIENT
Start: 2023-07-06 | End: 2023-08-09

## 2023-07-06 NOTE — TELEPHONE ENCOUNTER
Medication refill information reviewed. Please see [email or web address] communication about oxycodone request.      Due date for buprenorphine (SUBUTEX) 8 MG SUBL sublingual tablet 07/06/23    Prescriptions prepped for review.     Will route to provider.

## 2023-07-06 NOTE — TELEPHONE ENCOUNTER
Signed Prescriptions:                        Disp   Refills    buprenorphine (SUBUTEX) 8 MG SUBL sublingu*60 tab*0        Sig: Place 1 tablet (8 mg) under the tongue 2 times daily           Max 2 tabs per day. Fill/start 07/06/23  Authorizing Provider: SUSANA PETTY    oxyCODONE (ROXICODONE) 5 MG tablet         42 tab*0        Sig: Take 1 tablet (5 mg) by mouth every 6 hours as needed           for severe pain Max of 3/day. Fill/begin           05/17/23. Short term script for acute pain flare.  Authorizing Provider: SUSANA PETTY    naloxone (NARCAN) 4 MG/0.1ML nasal spray   0.2 mL 1        Sig: Spray 1 spray (4 mg) into one nostril alternating           nostrils as needed for opioid reversal every 2-3           minutes until assistance arrives  Authorizing Provider: SUSANA PETTY        Reviewed [place]  July 6, 2023- no concerning fills.    uSsana Petty APRN, RN CNP, FNP  [facility]  [facility]

## 2023-07-06 NOTE — TELEPHONE ENCOUNTER
In separate message sent today, 7/6/23, patient states his wife is mailing in the application.    Renetta Swan RN

## 2023-07-11 ENCOUNTER — VIRTUAL VISIT (OUTPATIENT)
Dept: PSYCHIATRY | Facility: CLINIC | Age: 58
End: 2023-07-11
Attending: PSYCHIATRY & NEUROLOGY
Payer: COMMERCIAL

## 2023-07-11 DIAGNOSIS — F41.1 GENERALIZED ANXIETY DISORDER WITH PANIC ATTACKS: ICD-10-CM

## 2023-07-11 DIAGNOSIS — F33.1 MAJOR DEPRESSIVE DISORDER, RECURRENT EPISODE, MODERATE (H): Primary | ICD-10-CM

## 2023-07-11 DIAGNOSIS — F43.10 PTSD (POST-TRAUMATIC STRESS DISORDER): ICD-10-CM

## 2023-07-11 DIAGNOSIS — F41.0 GENERALIZED ANXIETY DISORDER WITH PANIC ATTACKS: ICD-10-CM

## 2023-07-11 PROCEDURE — 99214 OFFICE O/P EST MOD 30 MIN: CPT | Mod: VID

## 2023-07-11 PROCEDURE — 90833 PSYTX W PT W E/M 30 MIN: CPT | Mod: VID

## 2023-07-11 ASSESSMENT — PAIN SCALES - GENERAL: PAINLEVEL: SEVERE PAIN (7)

## 2023-07-11 NOTE — PROGRESS NOTES
[facility]  TRANSFER of CARE DIAGNOSTIC ASSESSMENT     CARE TEAM:    PCP- Aiden Resendez  Therapist- Started psychotherapy on 5/3/2023  Rheum- MD Karma and Pain-JUANITA Pike     Jamison is a 57 year old who uses the pronouns him, his.      Diagnoses     Major depressive disorder, recurrent, moderate  Generalized anxiety disorder, with panic  PTSD     Medical Diagnoses:  Chronic pain  Rheumatoid arthritis  Ankylosing spondylitis ([id number] positive)  Lumbar degenerative disc disease  Hypertension  Hyperlipidemia     Assessment     Jamison is a 57 year old man with past psychiatric diagnoses of MDD, JEFF, and PTSD who is seen today for follow up and transfer of care. Significant symptoms have included depressed mood, anhedonia, poor sleep, and avolition. Jamison was last seen in 06/2023 during which liothyronine was decreased. Since the last visit, depressed mood, anhedonia and avolition have been up and down. Significant contributors to current presentation include having to put down his dog that he has known for 13 years, loss of his ability to run/exercise and do other activities that he used to enjoy due to chonic pain/rhuematoid arthritis. Psychotropic medications are helping some. Discussed a trial of duloxetine or levomilnacipran which could also help with pain but patient is not open to any medication changes at this time. Medical cannabis use could potentially contribute to lack of motivation. Patient is following up with rheumatology and the pain clinic.     Discussed behavioral activation to address avolition. Also discussed sleep hygiene to help with sleep.     Future considerations:  -Levomilnacipran  -TCAs  -In the process of starting a new medication for rheumatoid arthritis- will follow up on any potential drug interactions with the new medication    Psychotropic drug interactions: [PSYCHCLINICDDI]    Increased risk of QTc  prolongation: fluoxetine, quetiapine, Suboxone  Increased risk of serotonin syndrome: fluoxetine, Suboxone  Increased risk of CNS/respiratory depression: Suboxone, quetiapine, oxycodone   Increased risk of hypotension: prazosin, buprenorphine     Notable interactions and/or >3 psychiatric meds: Yes  MANAGEMENT:  regimen reviewed, no changes needed. Monitoring for adverse effects and patient is aware of risks    [place] was [place] today: indicates patient taking buprenorphine and oxycodone as prescribed      Plan     1) Medications:   - Continue fluoxetine (PROZAC) 80 mg daily  - Continue quetiapine (SEROQUEL) 25 mg at bedtime  - Continue liothyronine (CYTOMEL) 12.5 mcg daily   - Continue prazosin (MINIPRESS) 1 mg at bedtime      Other:  - Suboxone  - Methotrexate  - Oxycodone (per pain clinic for pain flare short term)  - Medical cannabis (per pain clinic)     2) Therapy- Recommended. Going well.      3) Next Due   Labs - AP labs last ordered on 6/15/2023 but not completed - encouraged patient to complete these labs prior to the next visit  EKG - last EKG 12/8/2022.  Rating scales- AIMS     4) Referral -   - Patient will follow up sleep medicine  - did not see them      5) RTC: 4 weeks      6) Crisis Numbers:   Provided routinely in AVS   After hours:  [phone]       Pertinent Background                                                   [most recent eval 07/11/23]     Copied from prior notes and reviewed with patient    Originally, diagnosed with depression and anxiety in 2015 but first experienced symptoms of depression in middle school. He most recently had a significant worsening of symptoms in 2017 after he was diagnosed with rheumatoid arthritis, ankylosing spondylitis ended up with a knee injury and was no longer able to work or run which he used as his primary coping mechanism.     Pertinent Items Include: suicidal ideation, SIB [cutting], multiple psychotropic trials , severe med reaction, psych hosp  (<3), SUBSTANCE USE: alcohol, substance use treatment  and Major Medical Problems (Rheumatoid Arthritis and Ankylosing Spondylitis)     Subjective       Last seen on 2023 during which liothyronine was decreased  Since last visit    Mood: has not noticed anything different-mood up and down, had to put down a dog that he has had for 13 years. Put it down 1.5 wks ago. First time to do this. Dogs  naturally in the past. His dog was in pain.He had to make a decision. Feels like he killed her.Helped patient to reframe this thought.  Does not feel as lost as when he was hospitalized. Used to be a marathon runner, loved running, can't do it now, misses endorphin release from running. Discussed swimming-no swimming pool close to him    Anhedonia: still kind of not doing stuff that he used to enjoy doing. Got another dog 2 wks prior to putting the other down. Takes this dog out for walks daily. Lacks motivation to do things that he used to enjoy. This new dog is pushing him to go walk.     Sedation-Less    Meds - no concerns, discussed duloxetine or levomilnacipran. Says he is worried about medication changes but would think about it. Says current meds better than nothing.      Sleep:Hit or miss. Trying to get to bed sooner but still feels tired when he wakes up 4-6 hrs. Goes to bed between 11pm -2am. Having vivid scary dreams last couple of nights and attributes this to falling asleep on the couch and forgoting to take prazosin. Discussed sleep hygiene.      Health - pain makes things tough. Does not want to feel drugged up with the pain medications but at the same time needs them for pain. Having nerves in lower back cauterized q 6 months helps. Frustrated that he has to jump through hoops to get meds.     SI- not lately, last a couple of weeks ago- was wishing he won't wake up , did not have plans. Agrees to call if he needs help    Therapy going good      Recent Psych Symptoms:   Elevated:  none  Psychosis:   "none    Pertinent Substance Use:   No issues now    Medical Review of Systems:   Lightheadedness/orthostasis: None  Headaches: None  GI: None     Mental Status Exam     Alertness: alert  and oriented  Appearance: casually groomed  Behavior/Demeanor: cooperative, pleasant and calm, with fair  eye contact   Speech: normal and regular rate and rhythm  Language: no obvious problem  Psychomotor: normal or unremarkable  Mood: up and down  Affect: restricted; congruent to: mood- yes, content- no  Thought Process/Associations: unremarkable  Thought Content:  Reports none;  Denies suicidal ideation, violent ideation and delusions   Perception:  Reports none;  Denies hallucinations  Insight: adequate  Judgment: fair  Cognition: does  appear grossly intact; formal cognitive testing was not done  Gait and Station: N/A (telehealth)     Social History                                 pt reported     Copied from prior note and reviewed with patient    Social Hx:  Financial/ Work- planning to attend [employer] for a degree in psychology; unable to work due to diagnosis of rheumatoid arthritis and ankylosing spondylitis, on disability   Partner/ -  in   Children- 26 and 28 year old  Living situation- [place], house with wife and daughter 25 yo at home, son is in [place], two dogs - lost one  Social/ Spiritual Support- Talks to  every two weeks, not many friends, family is supportive, likes walking and hiking     Family Mental Health History                                 pt reported     Family Hx:  Mom - depression (since  when sister )       Past Psychiatric History     Copied from prior note and reviewed with patient    SIB- Hx of cutting/carving of skin-last many years ago   Suicide Attempt [#, most recent]- One-many years ago, no access to firearms  Suicidal Ideation Hx- Passive SI intermittently   Violence/Aggression Hx- No, not now  Psychosis Hx- \"Was in a psychosis for about a week in the " "context of switching medications, 7-8 yrs ago, went to the ER,   Eating Disorder Hx- No   Psych Hosp [#, most recent]- Yes, 3-4 times, 10 days longest, about 1 year ago  Commitment- No   TMS/ECT- No   Outpatient Programs - Yes, CD treatment at [facility], in patient and outpatient program           SUBSTANCE USE HISTORY   Past Use:  Alcohol     Past Psych Med Trials     Copied from prior note and reviewed with patient    Medication  Dose   (mg) Effect  Dates of Use   Fluoxetine 40-80 Felt like was initially helpful 2016 - 2017 2022-2023   Sertraline 100 Effective initially, eventually self discontinued as not helpful 2018 - 7/2020   Duloxetine 30 BID Used to treat nerve pain; felt weird on it 2017   Bupropion  BID ineffective 2017 - 2019   Nortriptyline 50 For pain 2017 -  3/2019   Mirtazapine   Dissociated for entire first week after taking  Per MTM on 5/15/23: \"\"some kind of psychosis\". States that he was also taking oxycodone, alprazolam, fentanyl patches at the time\" 2012   Trazodone 50   2013             Hydroxyzine 25 QID prn For anxiety and panic attacks; not effective for severe anxiety 2015 - 2017   Buspirone 15 TID   2016 - 2017   Gabapentin 100 TID   2013 - 2016   Alprazolam 0.5 TID For anxiety 8495-5851   Diazepam 2 BID For anxiety 2016   Lorazepam 1 TID For anxiety 2016   Quetiapine  25-50   4735-9461   Clonazepam 1 For anxiety 2016         Vitals     There were no vitals taken for this visit.     Medical History     ALLERGIES: Mirtazapine, Hydrocodone, Mirtazapine, and Ms contin [morphine]    Patient Active Problem List   Diagnosis     CARDIOVASCULAR SCREENING; LDL GOAL LESS THAN 160     Obesity, Class I, BMI 30-34.9     Tear meniscus knee, right, initial encounter     Rheumatoid arthritis involving multiple sites, unspecified rheumatoid factor presence     Chronic pain     Right-sided thoracic back pain, unspecified chronicity     Generalized anxiety disorder     Panic attack     Ankylosing " spondylitis (H)     Moderate major depression (H)     Immunocompromised (H)     Essential hypertension with goal blood pressure less than 140/90     Suicidal ideation     Insomnia due to other mental disorder     Major depressive disorder, recurrent episode, severe with mixed features (H)     Chronic back pain greater than 3 months duration     Contact dermatitis and eczema     Dermatitis     Drug dependence (H)     Encounter for screening colonoscopy     Esophageal reflux     Hematuria     Hyperlipidemia     Lumbar radiculopathy     Plantar fascial fibromatosis     Sprain of sacroiliac region     Overweight     Nonintractable migraine     Narcotic abuse, continuous (H)     Morbid obesity (H)     Chronic bilateral low back pain without sciatica        Medications     Current Outpatient Medications   Medication Sig Dispense Refill     Abatacept (ORENCIA) 125 MG/ML SOAJ auto-injector Inject 1 mL (125 mg) Subcutaneous every 7 days Hold for signs of infection, and seek medical attention. 4 mL 1     atorvastatin (LIPITOR) 20 MG tablet Take 1 tablet (20 mg) by mouth daily 90 tablet 0     buprenorphine (SUBUTEX) 8 MG SUBL sublingual tablet Place 1 tablet (8 mg) under the tongue 2 times daily Max 2 tabs per day. Fill/start 07/06/23 60 tablet 0     certolizumab pegol (CIMZIA) 2 X 200 MG/ML PSKT 2 syringes/kit Inject 2 mLs (400 mg) Subcutaneous every 30 days Hold for signs of infection, and seek medical attention. 2 each 3     FLUoxetine (PROZAC) 40 MG capsule Take 2 capsules (80 mg) by mouth daily 180 capsule 0     folic acid (FOLVITE) 1 MG tablet Take 3 tablets (3 mg) by mouth daily 270 tablet 2     liothyronine (CYTOMEL) 25 MCG tablet Take 0.5 tablets (12.5 mcg) by mouth daily 45 tablet 0     medical cannabis (Patient's own supply) See Admin Instructions (The purpose of this order is to document that the patient reports taking medical cannabis.  This is not a prescription, and is not used to certify that the patient has a  qualifying medical condition.)     Pills PRN   Lozenges PRN       methotrexate 2.5 MG tablet Take 8 tablets (20 mg) by mouth every 7 days 96 tablet 0     naloxone (NARCAN) 4 MG/0.1ML nasal spray Spray 1 spray (4 mg) into one nostril alternating nostrils as needed for opioid reversal every 2-3 minutes until assistance arrives 0.2 mL 1     oxyCODONE (ROXICODONE) 5 MG tablet Take 1 tablet (5 mg) by mouth every 6 hours as needed for severe pain Max of 3/day. Fill/begin 05/17/23. Short term script for acute pain flare. 42 tablet 0     prazosin (MINIPRESS) 1 MG capsule Take 1 capsule (1 mg) by mouth At Bedtime 90 capsule 0     QUEtiapine (SEROQUEL) 25 MG tablet Take 1 tablet (25 mg) by mouth At Bedtime 90 tablet 0     tiZANidine (ZANAFLEX) 4 MG tablet Take 1 tablet (4 mg) by mouth 3 times daily as needed for muscle spasms 45 tablet 1        Labs and Data         2/9/2023     9:12 AM 5/11/2023     8:43 AM 6/12/2023     8:06 PM   PROMIS-10 Total Score w/o Sub Scores   PROMIS TOTAL - SUBSCORES 13 13 13         3/31/2022    10:00 AM 6/12/2023     8:05 PM   CAGE-AID Total Score   Total Score 3 0   Total Score MyChart  0 (A total score of 2 or greater is considered clinically significant)         3/30/2023     8:40 AM 5/11/2023     8:41 AM 6/12/2023     8:04 PM   PHQ-9 SCORE   PHQ-9 Total Score MyChart 11 (Moderate depression) 12 (Moderate depression) 15 (Moderately severe depression)   PHQ-9 Total Score 11 12 15         1/5/2023     9:55 AM 6/12/2023     8:04 PM 6/12/2023     8:06 PM   JEFF-7 SCORE   Total Score 8 (mild anxiety) 8 (mild anxiety) 8 (mild anxiety)   Total Score 8 8 8       Liver/kidney function Metabolic Blood counts   Recent Labs   Lab Test 05/17/23  1007 12/08/22  1425 08/31/22  1405 10/11/21  1123   CR 0.84 0.98   < > 0.91   AST 30 32   < > 37   ALT 48 49   < > 47   ALKPHOS  --  112  --  102    < > = values in this interval not displayed.    Recent Labs   Lab Test 12/08/22  1427 12/08/22  1425   CHOL  --  224*    TRIG  --  230*   LDL  --  153*   HDL  --  25*   A1C 5.5  --    TSH  --  1.19    Recent Labs   Lab Test 05/17/23  1007   WBC 6.4   HGB 15.5   HCT 45.6   MCV 87              Risk Statement for Safety     Les did not appear to be an imminent safety risk to self or others.    TREATMENT RISK STATEMENT: The risks, benefits, alternatives and potential adverse effects have been discussed and are understood by the pt. The pt understands the risks of using street drugs or alcohol. There are no medical contraindications, the pt agrees to treatment with the ability to do so. The pt knows to call the clinic for any problems or to access emergency care if needed.  Medical and substance use concerns are documented above.  Psychotropic drug interaction check was done, including changes made today.     PROVIDER: Zain Hernández MD    Level of Medical Decision Making:   - At least 1 chronic problem that is not stable  - Engaged in prescription drug management during visit (discussed any medication benefits, side effects, alternatives, etc.)           Psychiatry Individual Psychotherapy Note   Psychotherapy start time - 9:10 AM  Psychotherapy end time - 9:30 AM  Date last reviewed with patient - 07/11/23  Subjective: This supportive psychotherapy session addressed issues related to goals of therapy and current psychosocial stressors. Patient's reaction: Action in the context of mental status appropriate for ambulatory setting.  Progress: Contemplation  Interactive complexity indicated? No  Plan: RTC in timeframe noted above  Psychotherapy services during this visit included myself and the patient.   Treatment Plan      SYMPTOMS; PROBLEMS   MEASURABLE GOALS;    FUNCTIONAL IMPROVEMENT / GAINS INTERVENTIONS DISCHARGE CRITERIA   Depression: insomnia and avolition   Choose at least one enjoyable activity and start behavioral activation and improve sleep hygiene Supportive / psychodynamic marked symptom improvement       Patient  staffed in clinic with Dr. Kirby who will sign the note.  Supervisor is Dr. Hunter.

## 2023-07-11 NOTE — PATIENT INSTRUCTIONS
"-It was nice to meet you today. Here is what we discussed:    -Depressed mood, lack of motivation, poor sleep and lack of interest have been up and down.     -Continue therapy to help manage current stressors.     -We did not do any medication changes today.     -We discussed other potentially helpful medications for depression like Levomilnacipran (Fetzima).     -We also discussed behavioral activation and sleep hygiene.       Zain Hernández MD   \"Sleep hygiene\" means having good sleep habits.  Follow these tips to sleep better at night:     Get on a schedule. Go to bed and get up at about the same time every day.  Listen to your body. Only try to sleep when you actually feel tired or sleepy.  Be patient. If you haven't been able to get to sleep after about 30 minutes or more, get up and do something calming or boring until you feel sleepy. Then return to bed and try again.  Don't have caffeine (coffee, tea, cola drinks, chocolate and some medicines), alcohol or nicotine (cigarettes). These can make it harder for you to fall asleep and stay asleep.  Use your bed for sleeping only. That means no TV, computer or homework in bed, especially during the evening and before bedtime.  Don't nap during the day. If you must nap, make sure it is for less than 20 minutes.  Create sleep rituals that remind your body it is time to sleep. Examples include breathing exercises, stretching or reading a book.  Avoid all electronic media (smart phone, computer, tablet) within 2 hours of bed time. The \"blue light\" in these devices activates the part of the brain that keeps you awake.  Dim the lights at night.  Get early morning sources of light (walk in the sunshine) to help set sleep patterns at night.  Try a bath or shower before bed. Having a warm bath 1 to 2 hours before bedtime can help you feel sleepy. Hot baths can make you alert, so be mindful of the temperature.  Don't watch the clock. Checking the clock during the night " "can wake you up. It can also lead to negative thoughts such as, \"I will never fall asleep,\" which can increase anxiety and sleeplessness.  Use a sleep diary. Track your sleep schedule to know your sleep patterns and to see where you can improve.  Get regular exercise every day. Try not to do heavy exercise in the 4 hours before bedtime.  Eat a healthy, balanced diet.  Try eating a light, healthy snack before bed, but avoid eating a heavy meal.  Create the right sleeping area. A cool, dark, quiet room is best. If needed, try earplugs, fans and blackout curtains.  Keep your daytime routine the same even if you have a bad night sleep. Avoiding activities the next day can make it harder to sleep.    **For crisis resources, please see the information at the end of this document**   Patient Education    Thank you for coming to the [facility] [place] [facility].     Lab Testing:  If you had lab testing today and your results are reassuring or normal they will be mailed to you or sent through [employer] within 7 days. If the lab tests need quick action we will call you with the results. The phone number we will call with results is # [phone]. If this is not the best number please call our clinic and change the number.     Medication Refills:  If you need any refills please call your pharmacy and they will contact us. Our fax number for refills is [phone].   Three business days of notice are needed for general medication refill requests.   Five business days of notice are needed for controlled substance refill requests.   If you need to change to a different pharmacy, please contact the new pharmacy directly. The new pharmacy will help you get your medications transferred.     Contact Us:  Please call [phone] during business hours (8-5:00 M-F).   If you have medication related questions after clinic hours, or on the weekend, please call [phone].     Financial Assistance " [phone]   Medical Records [phone]       MENTAL HEALTH CRISIS RESOURCES:  For a emergency help, please call 911 or go to the nearest Emergency Department.     Emergency Walk-In Options:   EmPATH Unit @ [facility] ([place]): [phone] - Specialized mental health emergency area designed to be calming  [facility] ([place]): [phone]  [facility] ([place]): [phone]  [facility] ([place]): [phone]    [facility] Crisis Information:   [place]: [phone]  [place]: [phone]  [place] ([place]) - Adult: [phone]     Child: [phone]  [place] - Adult: [phone]     Child: [phone]  [place]: [phone]  List of all [facility] resources:   https://[place].[facility]/dhs/people-we-serve/adults/health-care/mental-health/resources/crisis-contacts.jsp    National Crisis Information:   Crisis Text Line: Text  [place]  to 327868  Suicide & Crisis Lifeline: 988  National Suicide Prevention Lifeline: 3-203-700-TALK (1-209.341.2640)       For online chat options, visit https://suicidepreventionlifeline.org/chat/  Poison Control Center: 1-832.352.3611  Trans Lifeline: 1-686.312.3484 - Hotline for transgender people of all ages  The Tu Project: 3-279-344-1692 - Hotline for LGBT youth     For Non-Emergency Support:   Fast Tracker: Mental Health & Substance Use Disorder Resources -   https://www.[employer] [facility]Servicesn.org/

## 2023-07-11 NOTE — PROGRESS NOTES
Virtual Visit Details    Type of service:  Video Visit   Video Start Time: 8:40 AM  Video End Time: 9:40 AM    Originating Location (pt. Location): Home    Distant Location (provider location):  On-site  Platform used for Video Visit: [place]

## 2023-07-11 NOTE — NURSING NOTE
Is the patient currently in the state of [place]? YES    Visit mode:VIDEO    If the visit is dropped, the patient can be reconnected by: VIDEO VISIT: Text to cell phone: [phone]    Will anyone else be joining the visit? NO      How would you like to obtain your AVS? MyChart    Are changes needed to the allergy or medication list? NO    Reason for visit: CHAPO Walker on 7/11/2023 at 8:27 AM

## 2023-07-12 ENCOUNTER — VIRTUAL VISIT (OUTPATIENT)
Dept: BEHAVIORAL HEALTH | Facility: CLINIC | Age: 58
End: 2023-07-12
Payer: COMMERCIAL

## 2023-07-12 DIAGNOSIS — F33.2 SEVERE RECURRENT MAJOR DEPRESSION WITHOUT PSYCHOTIC FEATURES (H): Primary | ICD-10-CM

## 2023-07-12 DIAGNOSIS — F41.1 GAD (GENERALIZED ANXIETY DISORDER): ICD-10-CM

## 2023-07-12 ASSESSMENT — ANXIETY QUESTIONNAIRES
GAD7 TOTAL SCORE: 5
GAD7 TOTAL SCORE: 5
1. FEELING NERVOUS, ANXIOUS, OR ON EDGE: SEVERAL DAYS
2. NOT BEING ABLE TO STOP OR CONTROL WORRYING: SEVERAL DAYS
5. BEING SO RESTLESS THAT IT IS HARD TO SIT STILL: NOT AT ALL
6. BECOMING EASILY ANNOYED OR IRRITABLE: SEVERAL DAYS
3. WORRYING TOO MUCH ABOUT DIFFERENT THINGS: NOT AT ALL
4. TROUBLE RELAXING: NOT AT ALL
7. FEELING AFRAID AS IF SOMETHING AWFUL MIGHT HAPPEN: MORE THAN HALF THE DAYS

## 2023-07-12 ASSESSMENT — PATIENT HEALTH QUESTIONNAIRE - PHQ9: SUM OF ALL RESPONSES TO PHQ QUESTIONS 1-9: 8

## 2023-07-12 ASSESSMENT — COLUMBIA-SUICIDE SEVERITY RATING SCALE - C-SSRS
2. HAVE YOU ACTUALLY HAD ANY THOUGHTS OF KILLING YOURSELF?: NO
1. SINCE LAST CONTACT, HAVE YOU WISHED YOU WERE DEAD OR WISHED YOU COULD GO TO SLEEP AND NOT WAKE UP?: NO
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
## 2023-07-14 NOTE — TELEPHONE ENCOUNTER
Date 7/14/2023    Spoke to [facility] no pt application as of yet  Can call [phone]    BONG Pressley, [facility]  Specialty Pharmacy Clinic Liaison     [facility]    [email or web address]     Phone: [phone]  Fax: [phone]

## 2023-07-19 NOTE — TELEPHONE ENCOUNTER
Date 7/19/2023    Spoke to [employer] they do not have pt application. Writer called patient he states his wife took the application to send it in. He has to check with her to see what was done with it./    Will follow up      BONG Pressley, [facility]  Specialty Pharmacy Clinic Liaison     [facility]    [email or web address]     Phone: [phone]  Fax: [phone]

## 2023-07-20 ENCOUNTER — RADIOLOGY INJECTION OFFICE VISIT (OUTPATIENT)
Dept: PALLIATIVE MEDICINE | Facility: CLINIC | Age: 58
End: 2023-07-20
Payer: COMMERCIAL

## 2023-07-20 VITALS
HEART RATE: 85 BPM | RESPIRATION RATE: 16 BRPM | OXYGEN SATURATION: 95 % | SYSTOLIC BLOOD PRESSURE: 146 MMHG | DIASTOLIC BLOOD PRESSURE: 91 MMHG

## 2023-07-20 DIAGNOSIS — M51.369 DDD (DEGENERATIVE DISC DISEASE), LUMBAR: ICD-10-CM

## 2023-07-20 DIAGNOSIS — M47.816 SPONDYLOSIS OF LUMBAR REGION WITHOUT MYELOPATHY OR RADICULOPATHY: Primary | ICD-10-CM

## 2023-07-20 DIAGNOSIS — M54.59 LUMBAR FACET JOINT PAIN: ICD-10-CM

## 2023-07-20 DIAGNOSIS — G89.18 POST PROCEDURE DISCOMFORT: ICD-10-CM

## 2023-07-20 DIAGNOSIS — M47.817 SPONDYLOSIS WITHOUT MYELOPATHY OR RADICULOPATHY, LUMBOSACRAL REGION: ICD-10-CM

## 2023-07-20 PROCEDURE — 64635 DESTROY LUMB/SAC FACET JNT: CPT | Mod: 50 | Performed by: PAIN MEDICINE

## 2023-07-20 PROCEDURE — 99153 MOD SED SAME PHYS/QHP EA: CPT | Performed by: PAIN MEDICINE

## 2023-07-20 PROCEDURE — 64636 DESTROY L/S FACET JNT ADDL: CPT | Mod: 50 | Performed by: PAIN MEDICINE

## 2023-07-20 PROCEDURE — 99152 MOD SED SAME PHYS/QHP 5/>YRS: CPT | Performed by: PAIN MEDICINE

## 2023-07-20 RX ORDER — OXYCODONE HYDROCHLORIDE 5 MG/1
5 TABLET ORAL EVERY 12 HOURS PRN
Qty: 6 TABLET | Refills: 0 | Status: SHIPPED | OUTPATIENT
Start: 2023-07-20 | End: 2023-08-09

## 2023-07-20 RX ORDER — OXYCODONE HYDROCHLORIDE 5 MG/1
5 TABLET ORAL EVERY 12 HOURS PRN
Qty: 6 TABLET | Refills: 0 | Status: SHIPPED | OUTPATIENT
Start: 2023-07-20 | End: 2023-07-20

## 2023-07-20 RX ORDER — FENTANYL CITRATE 50 UG/ML
12.5-75 INJECTION, SOLUTION INTRAMUSCULAR; INTRAVENOUS EVERY 5 MIN PRN
Status: ACTIVE | OUTPATIENT
Start: 2023-07-20

## 2023-07-20 RX ADMIN — FENTANYL CITRATE 25 MCG: 50 INJECTION, SOLUTION INTRAMUSCULAR; INTRAVENOUS at 10:36

## 2023-07-20 RX ADMIN — FENTANYL CITRATE 25 MCG: 50 INJECTION, SOLUTION INTRAMUSCULAR; INTRAVENOUS at 10:31

## 2023-07-20 RX ADMIN — FENTANYL CITRATE 25 MCG: 50 INJECTION, SOLUTION INTRAMUSCULAR; INTRAVENOUS at 10:46

## 2023-07-20 RX ADMIN — FENTANYL CITRATE 25 MCG: 50 INJECTION, SOLUTION INTRAMUSCULAR; INTRAVENOUS at 10:25

## 2023-07-20 ASSESSMENT — PAIN SCALES - GENERAL
PAINLEVEL: MODERATE PAIN (5)
PAINLEVEL: EXTREME PAIN (8)

## 2023-07-20 NOTE — TELEPHONE ENCOUNTER
Sent patient Linkua message asking if he needs help with application, or if he is not interested in perusing the medication.  Asked patient to let us know either way.    Renetta Swan RN

## 2023-07-20 NOTE — NURSING NOTE
22 gauge Peripheral IV inserted into left anticubital - attempts: 1    Josias Lopez, RN  Patient Care Supervisor   [facility]

## 2023-07-20 NOTE — NURSING NOTE
Pre-procedure Intake  If YES to any questions or NO to having a   Please complete laminated checklist and leave on the computer keyboard for Provider, verbally inform provider if able.    For SCS Trial, RFA's or any sedation procedure:  Have you been fasting? Yes    If yes, for how long? More than 8 hours    Are you taking any any blood thinners such as Coumadin, Warfarin, Jantoven, Pradaxa Xarelto, Eliquis, Edoxaban, Enoxaparin, Lovenox, Heparin, Arixtra, Fondaparinux, or Fragmin? OR Antiplatelet medication such as Plavix, Brilinta, or Effient?   No     If yes, when did you take your last dose? NA    Do you take aspirin?  No    If cervical procedure, have you held aspirin for 6 days?   NA    Do you have any allergies to contrast dye, iodine, steroid and/or numbing medications?  NO    Are you currently taking antibiotics or have an active infection?  NO    Have you had a fever/elevated temperature within the past week? NO    Are you currently taking oral steroids? NO    Do you have a ? Yes    Are you pregnant or breastfeeding?  Not Applicable    Have you received the COVID-19 vaccine? Yes    If yes, was it your 1st, 2nd or only dose needed? 3    Date of most recent vaccine: 10/26/22    Notify provider and RNs if systolic BP >170, diastolic BP >100, P >100 or O2 sats < 90%

## 2023-07-20 NOTE — PROGRESS NOTES
Pre procedure Diagnosis: lumbosacral spondylosis  Post procedure Diagnosis: Same  Procedure performed: Bilateral lumbosacral radiofrequency ablation at L4, L5, sacral ala, fluoroscopically guided  Anesthesia:  MD Time IN: 1020   Sedation start time:  1022  Sedation end time:  1105     Medications given: fentanyl 100 mcg IV; versed 3 mg IV; toradol na mg IV  Intravenous fluids were administered, normal saline 200 cc's.  Complications: none  Operators: Aguilar Holley MD     Indications:   57 year old M was sent  for rfa.  They have a history of axial lbp.  Exam shows +kemps and they have tried conservative treatment including meds/pt/injections. Prior rfa with sig benefit >1yr    Pt had prior rfa with sig benefit, therefore radiofrequency ablation will be done.     Options/alternatives, benefits and risks were discussed with the patient including bleeding, infection, no pain relief, tissue trauma, exposure to radiation, reaction to medications including seizure, spinal cord injury,increased pain after the procedure, weakness, numbness or sensory changes and headache.   We also discussed risks of sedation, including reaction to medications and cardiovascular collapse.    Questions were answered to her satisfaction and she agrees to proceed. Voluntary informed consent was obtained and signed.     Vitals were reviewed: Yes  Allergies were reviewed:  Yes   Medications were reviewed:  Yes   Pre-procedure pain score: 5/10    Procedure:  After obtaining signed, informed consent, the patient was brought into the procedure suite and was placed in a prone position on the procedure table.   A Pause for the Cause was performed.  Patient was prepped and draped in sterile fashion.     Patient had an IV line placed prior the procedure.  The C-arm was positioned in the left oblique view to afford optimal view of the L4-L5 vertebral bodies. Lidocaine 1% was used to anesthetize the skin at each level.  At each level, a 15cm, 20G  curved radiofrequency cannula with a 10mm active tip was positioned, overlying the intersection of the transverse process and pedicle at L4 & L5, and was advanced under intermittent fluoroscopy until it contacted the transverse process and notch, and the tip slightly overran that process, just lateral to the mamillary process.  The position of each cannula was verified and optimized in the oblique view and AP views.    In the AP view, another cannula was placed at the sacral alar notch.      Each position was tested for motor and sensory stimulation, and was positioned so that stimulation was negative for stimuli outside the immediate area of the desired lesion.  Sensory stimulation was completed at 50 Hz, with max stimulation up to 0.3V.  Motor stimulation was completed at 2Hz, up to 1.5V.   Bupivacaine 0.5 % 1ml was injected at each level.  After allowing the local anesthetic to set up, a 90 second, 80 degree Celsius lesion was generated at all three levels.    The needles were then rotated within the pathway of the medial branch, and locations were evaluated with repeat imaging.  A second lesion was then generated at each location.    The procedure was then repeated on the right side, using the same technique as described above.  Sensory stimulation was positive at 0.5 V and motor stimulation was negative at 1.5 V except for multifidus movement.    The needles were flushed with the local anesthetic as they were withdrawn.        Hemostasis was achieved, the area was cleaned, and bandaids were placed when appropriate.  The patient tolerated the procedure well, and was taken to the recovery room.    Images were saved to PACS.      Post-procedure pain score: 5/10  Follow-up includes:   -f/u phone call in one week  -post-procedure pain medications: oxy 5mg q12 prn   -f/u with referring provider in 8 weeks    Aguilar Holley MD  [facility]

## 2023-07-20 NOTE — PATIENT INSTRUCTIONS
[facility]   Radiofrequency Ablation (RFA) Discharge Instructions     Today you saw:    Dr. Aguilar Holley    You should anticipate procedural pain for up to 2 weeks.   You may receive a prescription for pain medication and you should take this as directed.   Oxycodone 5 mg tab, take one every 12 hours as needed for pain  It may take up to 8 weeks to receive relief from the RFA   Follow up with your provider in 6 weeks.   If you received sedation before, during or after your procedure, for the next 24 hours you shall NOT:    -Drive    -Operate machinery    -Drink alcohol    -Sign any legal documents   You may resume your normal diet   You may resume your regular medications after the procedure   If you were holding your blood thinning medication, please restart taking it: N/A  Be cautious with walking. Numbness and/or weakness in the lower extremities may occur for up to 6-8 hours due to effect of local anesthetic  Avoid strenuous activity for the first 24 hours   You may resume your regular activities after 24 hours   You may shower, however no swimming, tub baths or hot tubs for 24 hours following your procedure   You may use ice packs 10-15 minutes three to four times a day at the injection site for comfort   Do not use heat to painful areas for 6 to 8 hours. This will give the local anesthetic time to wear off and prevent you from accidentally burning your skin.   Unless you have been directed to avoid the use of anti-inflammatory medications (NSAIDS), you may use medications such as ibuprofen, Aleve or Tylenol for pain control if needed.   If you experience any of the following, call the Pain Clinic during work hours (Monday through Friday 8 am-4:30 pm) at [phone] or the Provider Line after hours at [phone]:   -Fever over 100 degree F    -Swelling, bleeding, redness, drainage, warmth at the injection site    -Progressive weakness or numbness in your legs or arms     -Loss of bowel or bladder function    -Unusual headache that is not relieved by Tylenol    -Unusual new onset of pain that is not improving

## 2023-07-20 NOTE — NURSING NOTE
Discharge Information    IV Discontiued Time:  1115     Amount of Fluid Infused:  200    Discharge Criteria = When patient returns to baseline or as per MD order    Consciousness:  Pt is fully awake    Circulation:  BP +/- 20% of pre-procedure level    Respiration:  Patient is able to breathe deeply    O2 Sat:  Patient is able to maintain O2 Sat >92% on room air    Activity:  Moves 4 extremities on command    Ambulation:  Patient is able to stand and walk or stand and pivot into wheelchair    Dressing:  Clean/dry or No Dressing    Notes:   Discharge instructions and AVS given to patient    Patient meets criteria for discharge?  YES    Admitted to PCU?  No    Responsible adult present to accompany patient home?  Yes    Signature/Title:    Josias Lopez RN  RN Care Coordinator  [facility]

## 2023-07-20 NOTE — NURSING NOTE
MD Time IN: 1020   Sedation start time:  1022  Sedation end time:  1105    Medications given: fentanyl 100 mcg IV; versed 3 mg IV; toradol na mg IV  Intravenous fluids were administered, normal saline 200 cc's.  Sedation Level Achieved:  Moderate (conscious) sedation    Josias Lopez, RN  Patient Care Supervisor   [facility] [place]

## 2023-07-25 NOTE — TELEPHONE ENCOUNTER
[facility] for pt     BONG Pressley, [facility]  Specialty Pharmacy Clinic Liaison     [facility]    [email or web address]     Phone: [phone]  Fax: [phone]

## 2023-07-27 DIAGNOSIS — F33.1 MAJOR DEPRESSIVE DISORDER, RECURRENT EPISODE, MODERATE (H): ICD-10-CM

## 2023-07-27 DIAGNOSIS — F41.1 GENERALIZED ANXIETY DISORDER WITH PANIC ATTACKS: ICD-10-CM

## 2023-07-27 DIAGNOSIS — F41.0 GENERALIZED ANXIETY DISORDER WITH PANIC ATTACKS: ICD-10-CM

## 2023-07-28 RX ORDER — LIOTHYRONINE SODIUM 25 UG/1
TABLET ORAL
Qty: 45 TABLET | Refills: 3 | OUTPATIENT
Start: 2023-07-28

## 2023-07-29 NOTE — TELEPHONE ENCOUNTER
Refill denied   Too soon  script sent 6/16/23 #45(3 month suppy)  to fu 8/2023  Scheduling has been notified to contact the pt for appointment.

## 2023-07-31 ENCOUNTER — MYC MEDICAL ADVICE (OUTPATIENT)
Dept: PALLIATIVE MEDICINE | Facility: CLINIC | Age: 58
End: 2023-07-31
Payer: COMMERCIAL

## 2023-07-31 DIAGNOSIS — M25.551 HIP PAIN, RIGHT: ICD-10-CM

## 2023-07-31 DIAGNOSIS — F11.20 UNCOMPLICATED OPIOID DEPENDENCE (H): ICD-10-CM

## 2023-07-31 DIAGNOSIS — M25.50 MULTIPLE JOINT PAIN: ICD-10-CM

## 2023-07-31 DIAGNOSIS — M45.7 ANKYLOSING SPONDYLITIS OF LUMBOSACRAL REGION (H): ICD-10-CM

## 2023-07-31 DIAGNOSIS — F11.90 CHRONIC, CONTINUOUS USE OF OPIOIDS: ICD-10-CM

## 2023-07-31 DIAGNOSIS — M54.59 LUMBAR FACET JOINT PAIN: ICD-10-CM

## 2023-07-31 DIAGNOSIS — M62.838 MUSCLE SPASM: ICD-10-CM

## 2023-07-31 DIAGNOSIS — M51.369 DDD (DEGENERATIVE DISC DISEASE), LUMBAR: ICD-10-CM

## 2023-07-31 DIAGNOSIS — M05.79 RHEUMATOID ARTHRITIS INVOLVING MULTIPLE SITES WITH POSITIVE RHEUMATOID FACTOR (H): ICD-10-CM

## 2023-07-31 NOTE — TELEPHONE ENCOUNTER
Patient was contacted over Plored, the patient portion has been completed and received by patient to our clinic.  They have been faxed to [employer] [facility] [phone]. Confirmed it was sent successfully over Right Fax    Renetta Swan RN

## 2023-08-01 NOTE — TELEPHONE ENCOUNTER
Received call from patient requesting refill(s) of buprenorphine (SUBUTEX) 8 MG SUBL sublingual tablet   and   tiZANidine (ZANAFLEX) 4 MG tablet     Last dispensed from pharmacy on 07/06/23-Subutex  06/05/23-Zanaflex    Patient's last office/virtual visit by prescribing provider on 05/17/23  Next office/virtual appointment scheduled for 08/09/23    Last urine drug screen date 05/17/23  Current opioid agreement on file (completed within the last year) Yes Date of opioid agreement: 05/17/23    E-prescribe to pharmacy-  [id number] - [place], [place] - [address] AT [address]     Will route to nursing pool for review and preparation of prescription(s).

## 2023-08-01 NOTE — TELEPHONE ENCOUNTER
Please process a refill of buprenorphine (SUBUTEX) 8 MG SUBL sublingual tablet and tiZANidine (ZANAFLEX) 4 MG tablet to       [id number] - [place], [place] - [address] & [address] [address]  Phone: [phone] Fax: [phone]

## 2023-08-01 NOTE — TELEPHONE ENCOUNTER
Medication refill information reviewed.     Due date for  buprenorphine (SUBUTEX) 8 MG SUBL sublingual tablet  is 08/05/23     Prescriptions prepped for review.     Will route to provider.

## 2023-08-02 DIAGNOSIS — F41.0 GENERALIZED ANXIETY DISORDER WITH PANIC ATTACKS: ICD-10-CM

## 2023-08-02 DIAGNOSIS — F43.10 PTSD (POST-TRAUMATIC STRESS DISORDER): ICD-10-CM

## 2023-08-02 DIAGNOSIS — F33.1 MAJOR DEPRESSIVE DISORDER, RECURRENT EPISODE, MODERATE (H): ICD-10-CM

## 2023-08-02 DIAGNOSIS — F41.1 GENERALIZED ANXIETY DISORDER WITH PANIC ATTACKS: ICD-10-CM

## 2023-08-02 RX ORDER — BUPRENORPHINE 8 MG/1
8 TABLET SUBLINGUAL 2 TIMES DAILY
Qty: 60 TABLET | Refills: 0 | Status: SHIPPED | OUTPATIENT
Start: 2023-08-02 | End: 2023-09-25 | Stop reason: DRUGHIGH

## 2023-08-02 NOTE — TELEPHONE ENCOUNTER
Signed Prescriptions:                        Disp   Refills    buprenorphine (SUBUTEX) 8 MG SUBL sublingu*60 tab*0        Sig: Place 1 tablet (8 mg) under the tongue 2 times daily           Max 2 tabs per day. Fill 08/03/23 to start           08/05/23  Authorizing Provider: SUSANA PETTY    tiZANidine (ZANAFLEX) 4 MG tablet          45 tab*1        Sig: Take 1 tablet (4 mg) by mouth 3 times daily as needed           for muscle spasms  Authorizing Provider: SUSANA PETTY        [place]ed [place]  August 2, 2023- no concerning fills.    Susana GRANT, RN CNP, FNP  [facility]  [facility]

## 2023-08-04 NOTE — TELEPHONE ENCOUNTER
DATE 08/04/2023    [facility] is unable to reach the pt 1040 tax form please re-fax    Sent message to care team    Can send to liaison to fax or they can from clinic   BONG Pressley, [facility]  Specialty Pharmacy Clinic Liaison     [facility]    [email or web address]     Phone: [phone]  Fax: [phone]

## 2023-08-06 RX ORDER — QUETIAPINE FUMARATE 25 MG/1
TABLET, FILM COATED ORAL
Qty: 90 TABLET | Refills: 3 | OUTPATIENT
Start: 2023-08-06

## 2023-08-06 RX ORDER — PRAZOSIN HYDROCHLORIDE 1 MG/1
CAPSULE ORAL
Qty: 90 CAPSULE | Refills: 3 | OUTPATIENT
Start: 2023-08-06

## 2023-08-06 NOTE — TELEPHONE ENCOUNTER
Medication requested:  QUEtiapine Fumarate 25 MG Oral Tablet   Last refilled: 5/31/23  Qty: 90/0     Medication requested:  PRAZOSIN HYDROCHLORIDE  1MG  CAP   Last refilled: 5/31/23  Qty: 90/0      Last seen: 7/11/23  RTC: 4 weeks  Cancel: 0  No-show: 0  Next appt: 8/22/23    Refill decision:   Medication supply adequate to 8/31/23. Appt 8/22/23 / address refills at that time

## 2023-08-07 NOTE — TELEPHONE ENCOUNTER
Forms re-faxed to [facility] [phone] but due to Transmission error x2, faxed to Anastasiya Castaneda pharmacy liaison: [phone] and was received.    Renetta Swan RN

## 2023-08-09 ENCOUNTER — OFFICE VISIT (OUTPATIENT)
Dept: PALLIATIVE MEDICINE | Facility: CLINIC | Age: 58
End: 2023-08-09
Payer: COMMERCIAL

## 2023-08-09 VITALS — HEART RATE: 72 BPM | SYSTOLIC BLOOD PRESSURE: 125 MMHG | DIASTOLIC BLOOD PRESSURE: 79 MMHG

## 2023-08-09 DIAGNOSIS — M25.50 MULTIPLE JOINT PAIN: ICD-10-CM

## 2023-08-09 DIAGNOSIS — M54.59 LUMBAR FACET JOINT PAIN: ICD-10-CM

## 2023-08-09 DIAGNOSIS — M79.18 MYOFASCIAL PAIN: ICD-10-CM

## 2023-08-09 DIAGNOSIS — M05.79 RHEUMATOID ARTHRITIS INVOLVING MULTIPLE SITES WITH POSITIVE RHEUMATOID FACTOR (H): ICD-10-CM

## 2023-08-09 DIAGNOSIS — M45.7 ANKYLOSING SPONDYLITIS OF LUMBOSACRAL REGION (H): ICD-10-CM

## 2023-08-09 DIAGNOSIS — F11.90 CHRONIC, CONTINUOUS USE OF OPIOIDS: ICD-10-CM

## 2023-08-09 DIAGNOSIS — M51.369 DDD (DEGENERATIVE DISC DISEASE), LUMBAR: Primary | ICD-10-CM

## 2023-08-09 DIAGNOSIS — M62.838 MUSCLE SPASM: ICD-10-CM

## 2023-08-09 DIAGNOSIS — F11.20 UNCOMPLICATED OPIOID DEPENDENCE (H): ICD-10-CM

## 2023-08-09 DIAGNOSIS — M47.817 SPONDYLOSIS WITHOUT MYELOPATHY OR RADICULOPATHY, LUMBOSACRAL REGION: ICD-10-CM

## 2023-08-09 PROCEDURE — 99214 OFFICE O/P EST MOD 30 MIN: CPT | Performed by: NURSE PRACTITIONER

## 2023-08-09 RX ORDER — BUPRENORPHINE 8 MG/1
8 TABLET SUBLINGUAL 3 TIMES DAILY
Qty: 90 TABLET | Refills: 0 | Status: SHIPPED | OUTPATIENT
Start: 2023-08-09 | End: 2023-09-25

## 2023-08-09 ASSESSMENT — PAIN SCALES - GENERAL: PAINLEVEL: EXTREME PAIN (8)

## 2023-08-09 NOTE — PROGRESS NOTES
"[facility]      8/9/2023        Chief complaint:   -Bilateral low back pain right > Left- no radiation into the legs  -Hands, wrists, shoulders, elbows, and bilateral knees   Joint pain is the most bothersome from RA  \" I hurt all over, joint pain is the most bothersome\"      Interval history:   Jailene Breen is a 57 year old male is known to me for   Lumbar spondylosis, pain is worse with extension and rotation indicating a facetogenic component to pain  Lumbar degenerative disc disease  SI joint pain  Ankylosing spondylitis  Myofascial pain  Chronic pain syndrome  PMHx includes: Panic attacks, back pain, arthritis, anxiety, ankylosing spondylitis  PSHx includes: Right knee surgery ×2, left foot surgery right knee arthroscopy        Recommendations/plan at the last visit on 5/17/2023 included:  Check out Shmuel Murray online for gentle exercise  Physical Therapy:  Referral to PT  Clinical Health Psychologist: continue work with your psychiatrist and therapist  Diagnostic Studies:  None  Medication Management:    continue Subutex 8mg twice per day  Oxycodone for pain flare short term  Continue medical cannabis   Start tizanidine 4mg three times daily as needed for muscle spasms  Further procedures recommended: repeat lumbar RFA, we will call to set this up  You can reach out to the [facility]  Program here whenever this is good for you. (previously deferred due to out of pocket costs)   [facility]   [address], [address]   [facility] [address]   Phone: [phone]   Fax: [phone]   Recommendations to PCP: see above  UDT today, last took Suboxone this morning and oxycodone on Sunday (3 days ago)   Follow up:12 weeks in-person visit. Please call [phone] to make your follow-up appointment with me.           Since his last visit, Jailene Breen reports:    Interval history August 9, 2023  -he is in an RA flare. He did not qualify for " financial assistance for the O[place]. He has an upcoming appt with his Rheumatologist next week.    -joint pain is the most bothersome.   -low back pain is pretty good right now  -has a new [place] Page young dog and is more active with her.   -he had to put his older GSD down in early July, this was difficult.       Interval history May 17, 2023  -having a lot of pain, he is off of Rheumatological medication. Will be starting a new med but needs to sign up for financial assistance first. Med likely to be certolizumab pegol.  -discussed 2 cancelled appts in a row and a no show in April. Stressed importance of being seen at least quarterly while on opiate medication  -multiple joint pain and pain all over is the most bothersome   -he is off of Enbrel now    Interval history November 23, 2022  -he is not currently on Enbrel, hoping get supply this week  -having more joint pain off of Enbrel. Notices that that pain worsens the day before he is due for his injection. Also continues on Methotrexate.   -would like to increase Subutex dose. Had been on 16 mg a day of Suboxone, but reduced prior to surgery and then did not want to increase due to cost. Now on Subtex and since this is substantially more affordable would like to increase from current dose of 12 mg per day back to 16 mg per day as this was more effective for pain management.  - Using prn oxycodone when pain is increased during flares, primarily notes pain in lower back, and into his joints during flares  - Would like to join a gym with a pool, thinks that he could improve his strength and endurance if he were able to exert himself safely in the pool.   - He is planning on working on dietary changes in the new year.     Interval history August 31, 2022  -he is back on DMARDs for his RA. (Enbrel and methotrexate)  -low back pain now on both sides. No radiation to the legs  -multiple joint pain from RA/AK  -tripped over dog while carrying groceries up the  "stairs and he struck his right knee  -he notices he has been sweating more easily  -notes his weight is close to 300 lbs.     Interval history May 27, 2022  -having an arthritis flare as is currently off of his disease modifying agents. This has now been covered and he will begin this again soon (Enbrel).   -having pain in multiple joints and low back, see above.     Interval history February 4, 2022  -he awoke on 1/1/2022 at 3:30 AM and felt like he was going to die. He called the ambulance and went to the hospital. He was then diagnosed with COVID-19 on 1/3/2022 and has been feeling sick ever since. He has been nauseated and has not been able to take his RA DMARD.   -he has increased joint pain in all of his joints, low back pain and neck pain is worse. He cannot dress himself due to pain. States his finger joints are all swollen. He [place] using marijuana from [place] Cannabis program and prednisone from  Rheumatology.   -his rheumatologist is out of the clinic for a week. The covering rheumatologist on call felt he could re-start his rheumatology medication. Les is not comfortable re-starting his RA medication yet since he is still feeling sweaty and coughing and nauseated.         At this point, the patient's participation with our multidisciplinary team includes:  The patient has been compliant with the program.  PT - last PHYSICAL THERAPY with Asif Saldana on 12/23/2020  Health Psych - worked with  Padilla Keene, last on 11/6/2018.  Working with Jonna Farrell PhD and been seen >8 times. Last visit with Santa was on 9/2/2020         Pain scores:    Pain intensity on average is 6 on a scale of 0-10.    Range is 5-9/10. (his pain can bring him to tears)  Pain right now is 8/10.   Pain is described as \"sharp, throbbing, stabbing, burning.\"  Pain is constant in nature      Current pain relevant medications:      -nortriptyline 50mg at bedtime (helpful)  -ibuprofen 800mg TID PRN (using 3 times " daily-helpful)  -Tylenol 500mg using 1000mg TID (unsure if helpful)  -Maxalt 10mg PRN migraine (helpful for migraine--he does have visual aura)  -naloxone nasal spray PRN for accidental opiate overdose  -Subutex 8 mg BID (helps some)      Other pertinent medications:   -Fluoxetine 80mg every day (somewhat helpful, managed by psychiatry)  -quetiapine 150mg at bedtime        Previous Medications:   OPIATES: Oxycodone (helpful), Fentanyl (100mcg/hr patch helpful), hydrocodone (itching), Tramadol (not helpful), Suboxone (initially felt it was not helpful, but after he tapered off of it due to his preference prior to surgery, he now feels that it was helpful for his pain)  NSAIDS: ibuprofen (not helpful), Aleve (not helpful)  MUSCLE RELAXANTS: methocarbamol (somewhat helpful), Flexeril (somewhat helpful but caused severe urinary retention requiring catheterization), tizanidine (unsure if helpful), metaxalone (unsure), SOMA (helpful), chlorzoxazone (helped some)  ANTI-MIGRAINE MEDS: Maxalt (helpful for migraine), Imitrex injection (somewhat helpful)  ANTI-DEPRESSANTS: Prozac (helpful), Cymbalta (felt weird on it), nortriptyline (unsure if helpful), Buspar (unsure), Remeron (blacked out), bupropion (not helpful for smoking cessation)  SLEEP AIDS: Ambien (helpful)  ANTI-CONVULSANTS: gabapentin (felt odd on this medication, unsure of dose), Lyrica (not helpful for 4 months, unsure of dose), Topamax (not helpful, he felt weird on it)   TOPICALS: Lidocaine patches (not helpful), Voltaren gel (not helpful)  Other meds: Tylenol (unsure if helpful)        Other treatments have included:   Jailene Breen has been seen at a pain clinic in the past.  [facility]  PT: tried, not helpful  Chiropractic: tried, not helpful  Acupuncture: none  TENs Unit: tried, helpful         Injections:     -has had injections at outside clinics  -has had epidural injections for low back pain (one was helpful)  -he has had lumbar medial  "branch blocks at [facility] and he was going to have lumbar radiofrequency ablation (did not do this due to cost)  -4/3/2017 right LMBBs with Dr. Ashlyn Gamble (helpful)  -4/26/2017 right LMBBs with Dr. Ashlyn Gamble (helpful)  -5/31/2017 right L3, L4, L5 RFA with Dr. Ashlyn Gamble (good relief for over 6 months)  -3/15/2018 right L5 transforaminal MAKAYLA with Dr. Ashlyn Gamble (not helpful)  -5/10/2018 trigger point injections with Dr. Ashlyn Gamble(somewhat helpful).  -7/12/2018 Right L3, L4, L5 RFA with Dr. Ashlyn Gamble (helpful).  -9/20/2018 Left piriformis injections, bilateral gluteal trigger point with Dr. Gamble (helpful).  -1/31/2019 right L2-3, L3-4 and L4-5 facet joint injections with Dr. Ashlyn Gamble (somewhat helpful)  4/4/2019  right L3, L4, L5/sacral ala lumbar radiofrequency ablation with Dr. Gamble (helpful over right side)  6/28/2019 Bilateral facet joint injections at l4-5, L5-S1 with Dr. Holley (only helpful for 7 days)  -2/12/2020 right D4-Y0-J3-sacral ALA RFA done with Dr. Ashlyn Gamble (helpful)  -8/26/2020 left L3-5 lumbar medial branch blocks #1 with Dr. Ashlyn Gamble (very helpful)  -11/11/2020 left L3-5 lumbar medial branch blocks #2 with Dr. Ashlyn Gamble  (helpful)   -2/1/2021 bilateral lumbar RFA L4-5 and L5-S1 with Dr. Ashlyn Gamble   (this \"helped a little bit\" and then he tweaked his back about 2 weeks after it was done and it wasn't helpful)  -09/22/2022 lumbosacral RFA L4, L5 and sacral ala with Dr. Aguilar Holley (helpful,, at least 50% relief, he has a RA flare right now, so harder to tell)  -7/20/2023 bilateral lumbar RFA at L4, L5 and Sacral ala with Dr. Aguilar Holley (helpful, has reduced pain by at least 50-60% in the lower back)      THE 4 A's OF OPIOID MAINTENANCE ANALGESIA    Analgesia: Subutex is helpful    Activity: he is walking more with his young Turkish Page Dog    Adverse effects: none    Adherence to Rx protocol: yes          Side Effects: no side " effect  Patient is using the medication as prescribed: YES    Medications:  Current Outpatient Medications   Medication Sig Dispense Refill    atorvastatin (LIPITOR) 20 MG tablet Take 1 tablet (20 mg) by mouth daily 90 tablet 0    buprenorphine (SUBUTEX) 8 MG SUBL sublingual tablet Place 1 tablet (8 mg) under the tongue 2 times daily Max 2 tabs per day. Fill 08/03/23 to start 08/05/23 60 tablet 0    FLUoxetine (PROZAC) 40 MG capsule Take 2 capsules (80 mg) by mouth daily 180 capsule 0    folic acid (FOLVITE) 1 MG tablet Take 3 tablets (3 mg) by mouth daily 270 tablet 2    liothyronine (CYTOMEL) 25 MCG tablet Take 1 tablet (25 mcg) by mouth daily 30 tablet 1    medical cannabis (Patient's own supply) See Admin Instructions (The purpose of this order is to document that the patient reports taking medical cannabis.  This is not a prescription, and is not used to certify that the patient has a qualifying medical condition.)     Pills PRN   Lozenges PRN      methotrexate 2.5 MG tablet Take 8 tablets (20 mg) by mouth every 7 days 96 tablet 0    naloxone (NARCAN) 4 MG/0.1ML nasal spray Spray 1 spray (4 mg) into one nostril alternating nostrils as needed for opioid reversal every 2-3 minutes until assistance arrives 0.2 mL 1    prazosin (MINIPRESS) 1 MG capsule Take 1 capsule (1 mg) by mouth At Bedtime 90 capsule 0    QUEtiapine (SEROQUEL) 25 MG tablet Take 1 tablet (25 mg) by mouth At Bedtime 90 tablet 0    tiZANidine (ZANAFLEX) 4 MG tablet Take 1 tablet (4 mg) by mouth 3 times daily as needed for muscle spasms 45 tablet 1    Abatacept (ORENCIA) 125 MG/ML SOAJ auto-injector Inject 1 mL (125 mg) Subcutaneous every 7 days Hold for signs of infection, and seek medical attention. (Patient not taking: Reported on 7/20/2023) 4 mL 1    certolizumab pegol (CIMZIA) 2 X 200 MG/ML PSKT 2 syringes/kit Inject 2 mLs (400 mg) Subcutaneous every 30 days Hold for signs of infection, and seek medical attention. (Patient not taking: Reported  on 7/20/2023) 2 each 3    oxyCODONE (ROXICODONE) 5 MG tablet Take 1 tablet (5 mg) by mouth every 12 hours as needed for severe pain (Patient not taking: Reported on 8/9/2023) 6 tablet 0    oxyCODONE (ROXICODONE) 5 MG tablet Take 1 tablet (5 mg) by mouth every 6 hours as needed for severe pain Max of 3/day. Fill/begin 05/17/23. Short term script for acute pain flare. (Patient not taking: Reported on 7/20/2023) 42 tablet 0       Medical History: any changes in medical history since they were last seen? No    Social History:    Home situation: , has 2 grown children  Occupation/Schooling: currently unemployed, worked as a  (unemployed since 3/1/2017). 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  Uses nicotine gum  If yes, was cessation counseling offered?  None needed        Physical Exam:     Vital signs: /79   Pulse 72      Behavioral observations:  Awake, alert and cooperative    Gait:  Slow, has single ended cane    Musculoskeletal exam:    Strength grossly equal throughout    Neuro exam: deferred    Skin/vascular/autonomic:  No suspicious lesions on exposed skin.     Other:  na          [place] Prescription Monitoring Program:  Reviewed [place] [facility] 8/9/2023- no concerning fills.  Susana GRANT RN CNP, FNP  [facility]  [facility]          DIRE Score for selecting candidates for long term opioid analgesia for chronic pain:  Diagnosis  2  Intractablility  2  Risk    Psychological health  1    Chemical health  2    Reliability  2    Social support  2  Efficacy  1  Total DIRE Score = 12. Note that  7-13 predicts poor outcome (compliance and efficacy) from opioid prescribing; 14-21 predicts good outcome (compliance and efficacy)  from opioid  prescribing    Assessment:    Degenerative disc disease of the lumbar spine  Lumbar facet joint pain  Spondylosis of lumbar region without myelopathy or radiculopathy  Ankylosing spondylitis of lumbosacral region  Rheumatoid arthritis involving multiple joints with positive rheumatoid factor  Multiple joint pain  Right hip pain  Muscle spasm  Myofascial pain  Uncomplicated opiate dependence  Chronic continuous use of opioids  Encounter for long-term use of opiate analgesic  UDT 5/17/2023  Signed CSA 5/17/2023    PMHx includes: Panic attacks, back pain, arthritis, anxiety, ankylosing spondylitis  PSHx includes: Right knee surgery ×2, left foot surgery right knee arthroscopy      Plan:    Check out Shmuel Marteustin online for gentle exercise  Physical Therapy:  Referral to PT  Clinical Health Psychologist: continue work with your psychiatrist and therapist  Diagnostic Studies:  None  Medication Management:    INCREASE Subutex 8mg  three times per day starting today. Next script to be filled on 8/23 and start 8/26  Continue medical cannabis   continue tizanidine 4mg three times daily as needed for muscle spasms  Further procedures recommended: none  Recommendations to PCP: see above   Follow up:12 weeks in-person/video. Please call [phone] to make your follow-up appointment with me.         ASSESSMENT AND PLAN:  (M51.36) DDD (degenerative disc disease), lumbar  (primary encounter diagnosis)  Plan: buprenorphine (SUBUTEX) 8 MG SUBL sublingual         tablet, Adult Pain Clinic Follow-Up Order            (M54.59) Lumbar facet joint pain  Plan: buprenorphine (SUBUTEX) 8 MG SUBL sublingual         tablet, Adult Pain Clinic Follow-Up Order            (M47.817) Spondylosis without myelopathy or radiculopathy, lumbosacral region  Plan: buprenorphine (SUBUTEX) 8 MG SUBL sublingual         tablet, Adult Pain Clinic Follow-Up Order            (M45.7) Ankylosing spondylitis of lumbosacral region (H)  Plan: buprenorphine  (SUBUTEX) 8 MG SUBL sublingual         tablet, Adult Pain Clinic Follow-Up Order            (M05.79) Rheumatoid arthritis involving multiple sites with positive rheumatoid factor (H)  Plan: buprenorphine (SUBUTEX) 8 MG SUBL sublingual         tablet, Adult Pain Clinic Follow-Up Order            (M25.50) Multiple joint pain  Plan: buprenorphine (SUBUTEX) 8 MG SUBL sublingual         tablet, Adult Pain Clinic Follow-Up Order            (M62.838) Muscle spasm  Plan: tiZANidine (ZANAFLEX) 4 MG tablet, Adult Pain         Clinic Follow-Up Order            (M79.18) Myofascial pain  Plan: tiZANidine (ZANAFLEX) 4 MG tablet, Adult Pain         Clinic Follow-Up Order            (F11.20) Uncomplicated opioid dependence (H)  Plan: buprenorphine (SUBUTEX) 8 MG SUBL sublingual         tablet, Adult Pain Clinic Follow-Up Order            (F11.90) Chronic, continuous use of opioids  Plan: buprenorphine (SUBUTEX) 8 MG SUBL sublingual         tablet, Adult Pain Clinic Follow-Up Order               BILLING TIME DOCUMENTATION:   TOTAL TIME includes:   Time spent preparing to see the patient: 0 minutes (reviewing records and tests)  Time spend face to face with the patient: 18 minutes  Time spent ordering tests, medications, procedures and referrals: 0 minutes  Time spent Referring and communicating with other healthcare professionals: 0 minutes  Documenting clinical information in Epic: 2 minutes    The total TIME spent on this patient on the day of the appointment was 20 minutes.                 Susana GRANT, RN CNP, FNP  [facility]  [facility] [place] [place]

## 2023-08-11 ENCOUNTER — MYC MEDICAL ADVICE (OUTPATIENT)
Dept: RHEUMATOLOGY | Facility: CLINIC | Age: 58
End: 2023-08-11
Payer: COMMERCIAL

## 2023-08-11 NOTE — TELEPHONE ENCOUNTER
Taking pill once a day and taking shot only once a week does not make this medication more immune suppressive. To keep drug levels steady in your body you need it once a day. It is also a biologic medication and carry same side effect profile as injections or infusions. Benefit of this pill is that it is effective for both RA and Ankylosing spondylitis rather than Actemra which is effective only in RA. But if Xeljanz cause side effects or is not approved then can try infusions or other injections in Anti TNF group like Golimumab, Certolizumab, infliximab etc.

## 2023-08-12 DIAGNOSIS — F32.A DEPRESSION, UNSPECIFIED DEPRESSION TYPE: ICD-10-CM

## 2023-08-14 ENCOUNTER — TELEPHONE (OUTPATIENT)
Dept: RHEUMATOLOGY | Facility: CLINIC | Age: 58
End: 2023-08-14
Payer: COMMERCIAL

## 2023-08-14 NOTE — TELEPHONE ENCOUNTER
FREE DRUG APPLICATION INITIATED    Medication: XELJANZ XR 11 MG PO TB24  Free Drug Program Name:   NANCY   Start Date: 08/14/2023  Phone #: [phone]  Fax #: [phone]  Additional Information:   Emailed Care team PAP application     Will be talking to pt about pap        BONG Pressley, [facility]  Specialty Pharmacy Clinic Liaison     [facility]    [email or web address]     Phone: [phone]  Fax: [phone]

## 2023-08-14 NOTE — TELEPHONE ENCOUNTER
Printed Patient support enrollment form for Xeljanz and placed on Dr. Parada's desk for signature.    Renetta Swan RN

## 2023-08-14 NOTE — TELEPHONE ENCOUNTER
PA Initiation    Medication: XELJANZ XR 11 MG PO TB24  Insurance Company: [employer] [place] Part D - Phone [phone] Fax [phone]  Pharmacy Filling the Rx:    Filling Pharmacy Phone:    Filling Pharmacy Fax:    Start Date: 8/14/2023        BONG Pressley, [facility]  Special[facility] Pharmacy Clinic [facility]    [email or web address]     Phone: [phone]  Fax: [phone]

## 2023-08-16 RX ORDER — FOLIC ACID 1 MG/1
3000 TABLET ORAL DAILY
Qty: 270 TABLET | Refills: 0 | Status: SHIPPED | OUTPATIENT
Start: 2023-08-16 | End: 2023-11-08

## 2023-08-16 NOTE — TELEPHONE ENCOUNTER
Forms completed and signed by provider. Faxed to [employer] at [phone]. Fax confirmed by [employer].     Ashlyn Dunbar, BSN, RN, PHN  Medical Specialty Care Coordinator  [facility]

## 2023-08-16 NOTE — TELEPHONE ENCOUNTER
Medication/Dose: folic acid (FOLVITE) 1 MG tablet    Last Written : 1/4/23  Last Quantity: 270, # refills: 2  Last Office Visit :  5/16/23  Pending appointment:     Aug 18, 2023 10:00 AM  (Arrive by 9:45 AM)  Return Visit with Raghu Parada MD  [facility] [address] [place] [id number]  [phone]       Prescription approved per Refill Protocol    LETHA Lamar, RN  [facility] Refill Team

## 2023-08-18 ENCOUNTER — VIRTUAL VISIT (OUTPATIENT)
Dept: RHEUMATOLOGY | Facility: CLINIC | Age: 58
End: 2023-08-18
Payer: COMMERCIAL

## 2023-08-18 DIAGNOSIS — H93.13 RINGING IN EAR, BILATERAL: Primary | ICD-10-CM

## 2023-08-18 PROCEDURE — 99214 OFFICE O/P EST MOD 30 MIN: CPT | Mod: VID | Performed by: STUDENT IN AN ORGANIZED HEALTH CARE EDUCATION/TRAINING PROGRAM

## 2023-08-18 ASSESSMENT — PAIN SCALES - GENERAL: PAINLEVEL: EXTREME PAIN (8)

## 2023-08-18 NOTE — PROGRESS NOTES
Jamison is a 57 year old who is being evaluated via a billable video visit.      How would you like to obtain your AVS? MyChart  If the video visit is dropped, the invitation should be resent by: Text to cell phone: [phone]  Will anyone else be joining your video visit? No      Subjective : Jailene Breen is a 57 year old male with PMH of anxiety, obesity, seropositive rheumatoid arthritis, possible ankylosing spondylitis evaluated via a billable virtual visit for management of seropositive rheumatoid arthritis.  He was diagnosed with seropositive rheumatoid arthritis in 2016, established care with Dr. Raygoza in 5/2016 and was started on methotrexate.  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 He was started on Humira in 12/2016 due to AS.  Discontinued methotrexate in 2/2017 due to lack of improvement. Humira was changed to Enbrel in 4/2019 due to lack of improvement.  Methotrexate was restarted in July 2020.     Interim History : In the last couple months he had flare up, it starts in his hands, ankles and then in his shoulders. Can not lift his arm up. Both the shoulders were hurting bad. He is having diarrhea for last couple months. He has started a new antidepressant. He is on Enbrel but has not taken Enbrel for a month due to diarrhea issues.  He thinks that Enbrel helps with his joint pains.  He is on methotrexate 4 tablets once a week but cannot tell if it helps him or not.  He has prednisone tablets and take 20 mg once a month when his symptoms worsens.  He does not like to take prednisone since his anxiety symptoms get worse.    He has lumbar degenerative disc disease and gets MAKAYLA.  He has noticed urinary retention issues sporadically.    July 16, 2021 - For the last few months has been struggling with depression.  He was admitted few times in the hospital  due to acute worsening of depression.  He is following up with his psychiatrist.  Few new medications including Seroquel was started.  He feels his symptoms are gradually getting better.  For the last few days he has noticed decrease in his appetite.  He has follow-up with his psychiatrist in few weeks and will discuss about that.  He is also on methotrexate 4 tablets once a week and Enbrel 50 subcu once a week for RA.  Overall his RA is stable.  He gets flareups once every 2 months.  Gets flare up he has pain in his hands, wrists, shoulders, feet.      November 15, 2022 - He is on Enbrel, he has not had his shot this week and he can feel some pain in his joints. If he does little bit activity he gets very sweaty and SOB. His cholesterol is very high.  He will follow-up with primary care to check his cholesterol, cardiac work-up, TSH and the hormone levels.  He is on Methotrexate 4 tab once a week. He denies any RA flares.     May 16, 2023 - Towards the end of the week he still feels that he is more achy when the effect of Enbrel wears off. He has depression issues and is working with his PCP.  He wants to see therapist but does not have appointment until September.  He is also on methotrexate 15 mg once a week.  He needs updated methotrexate monitoring labs.    August 18, 2023 - He has bad headaches this morning, it woke him up at 3 AM. For the last couple days he has ringing in his ears. He is having more pain in his joints. He is off of Enbrel now for a month. He is just taking Methotrexate 8 tab once a week. He has not taken prednisone in a while. He takes 2 Tylenol and 2 Ibuprofen three times a day.     Review of systems negative except for those mentioned above    Reviewed past medical, surgical, family history    Pertinent labs:     Component      Latest Ref Rng 5/17/2023  10:07 AM   WBC      4.0 - 11.0 10e3/uL 6.4    RBC Count      4.40 - 5.90 10e6/uL 5.23    Hemoglobin      13.3 - 17.7 g/dL 15.5     Hematocrit      40.0 - 53.0 % 45.6    MCV      78 - 100 fL 87    MCH      26.5 - 33.0 pg 29.6    MCHC      31.5 - 36.5 g/dL 34.0    RDW      10.0 - 15.0 % 12.7    Platelet Count      150 - 450 10e3/uL 228    Quantiferon-TB Gold Plus Result      Negative  Negative    TB1 Ag minus Nil Value      IU/mL 0.00    TB2 Ag minus Nil Value      IU/mL 0.00    Mitogen minus Nil Result      IU/mL 9.95    Nil Result      IU/mL 0.05    Creatinine      0.66 - 1.25 mg/dL 0.84    GFR Estimate      >60 mL/min/1.73m2 >90    Hepatitis B [place]      Nonreactive  Nonreactive    Hep B Surface Agn      Nonreactive  Nonreactive    Hepatitis C Antibody      Nonreactive  Nonreactive    AST      0 - 45 U/L 30    ALT      0 - 70 U/L 48    Albumin      3.4 - 5.0 g/dL 3.7    CRP Inflammation      0.0 - 8.0 mg/L <2.9    Quantiferon Nil Tube      IU/mL 0.05    Quantiferon TB1 Tube      IU/mL 0.05    Quantiferon TB2 Tube 0.05    QUANTIFERON MITOGEN      IU/mL 10.00        Physical exam : Moderately built, appear stated age, cooperative  Musculoskeletal: Reports tenderness over MCP, PIP joints, bilateral wrists.  Reports tenderness over bilateral ankles, MTP joints, lower back.      Assessment     Seropositive rheumatoid arthritis  History of Ankylosing spondylitis based on MRI pelvis ( 6/2016 )  Rheumatoid factor- 78, anti-CCP antibody > 340 - 4/2016  HLA-B27 positive  MRI SI joint-June 2016-partial ankylosis of left SI joint    Seropositive rheumatoid arthritis: He has seropositive rheumatoid arthritis (+ RF, + anti-CCP ) manifesting as a polyarticular symmetric arthritis.  He was on Enbrel 50 mg subcu once a week but his insurance has denied it now and he has not taken it for a month. He is on Methotrexate 8 tabs once a week.  His joint pains are getting worse since he is off of Enbrel. Will start him on Xeljanx 11 mg daily. We are waiting for prior authorization.    If Xeljanz is not approved, we can try infusions like Golimumab.      Ankylosing spondylitis: He was given diagnosis of ankylosing spondylitis in 2016 after MRI of the SI joints showed partial ankylosis.  He is HLA-B27 positive.  Overall his symptoms are stable.     Vaccinations: Vaccinations reviewed with Mr Breen. Risks and benefits of vaccinations were discussed.  - Influenza: encouraged yearly vaccination  -Pneumococcal 23 vaccine: 11/2022  - Zostavax: received 2 doses  - COVID : 2 doses and booster done.     Depression: He feels very depressed due to chronic disease and pain.  He does not have suicidal ideas.  He follows with primary care provider for antidepressants.      Migraines and ringing in ears : ENT referral placed for ringing in ears.     Plan     ENT referral placed     Continue Methotrexate 20 mg once a week     Will start Xeljanz 11 mg po daily     Follow up in 3 - 4 months, can use ALIA slot.       Video-Visit Details    Type of service:  Video Visit   Video Start Time: 9:50 am   Video End Time: 10:14 AM     Originating Location (pt. Location): Home    Distant Location (provider location):  Off-site  Platform used for Video Visit: [place] Parada MD

## 2023-08-18 NOTE — PATIENT INSTRUCTIONS
ENT referral placed     Continue Methotrexate 20 mg once a week     Will start Xeljanz 11 mg po daily     Follow up in 3 - 4 months, can use ALIA slot.

## 2023-08-21 NOTE — TELEPHONE ENCOUNTER
Prior Authorization Approval    Medication: XELJANZ XR 11 MG PO TB24  Authorization Effective Date: 8/21/2023  Authorization Expiration Date: 2/14/2024  Approved Dose/Quantity: 11mg XR   Reference #: [id number]   Insurance Company: [employer] Part D - Phone [phone] Fax [phone]  Expected CoPay:     The final Patient Pay Amount ($1803.92) exceeds  the max limit for the High Patient Pay Threshold ($100.00).    CoPay Card Available:    no  Financial Assistance Needed: YES pt is applying to free drug with [employer]  Which Pharmacy is filling the prescription:    Pharmacy Notified: Yes  Patient Notified:

## 2023-08-22 ENCOUNTER — VIRTUAL VISIT (OUTPATIENT)
Dept: PSYCHIATRY | Facility: CLINIC | Age: 58
End: 2023-08-22
Attending: PSYCHIATRY & NEUROLOGY
Payer: COMMERCIAL

## 2023-08-22 DIAGNOSIS — F41.1 GENERALIZED ANXIETY DISORDER WITH PANIC ATTACKS: ICD-10-CM

## 2023-08-22 DIAGNOSIS — F41.0 GENERALIZED ANXIETY DISORDER WITH PANIC ATTACKS: ICD-10-CM

## 2023-08-22 DIAGNOSIS — F43.10 PTSD (POST-TRAUMATIC STRESS DISORDER): ICD-10-CM

## 2023-08-22 DIAGNOSIS — F33.1 MAJOR DEPRESSIVE DISORDER, RECURRENT EPISODE, MODERATE (H): Primary | ICD-10-CM

## 2023-08-22 PROCEDURE — 90833 PSYTX W PT W E/M 30 MIN: CPT | Mod: VID

## 2023-08-22 PROCEDURE — 99214 OFFICE O/P EST MOD 30 MIN: CPT | Mod: VID

## 2023-08-22 RX ORDER — PRAZOSIN HYDROCHLORIDE 1 MG/1
1 CAPSULE ORAL AT BEDTIME
Qty: 90 CAPSULE | Refills: 0 | Status: SHIPPED | OUTPATIENT
Start: 2023-08-22 | End: 2023-10-24

## 2023-08-22 RX ORDER — FLUOXETINE 40 MG/1
80 CAPSULE ORAL DAILY
Qty: 180 CAPSULE | Refills: 0 | Status: SHIPPED | OUTPATIENT
Start: 2023-08-22 | End: 2023-10-24

## 2023-08-22 RX ORDER — QUETIAPINE FUMARATE 25 MG/1
25 TABLET, FILM COATED ORAL AT BEDTIME
Qty: 90 TABLET | Refills: 0 | Status: SHIPPED | OUTPATIENT
Start: 2023-08-22 | End: 2023-10-24

## 2023-08-22 NOTE — NURSING NOTE
Is the patient currently in the state of [place]? YES    Visit mode:VIDEO    If the visit is dropped, the patient can be reconnected by: VIDEO VISIT: Text to cell phone:   Telephone Information:   Mobile [phone]       Will anyone else be joining the visit? NO  (If patient encounters technical issues they should call [phone]    How would you like to obtain your AVS? MyChart    Are changes needed to the allergy or medication list? Pt stated no changes to allergies and Pt stated no med changes    Reason for visit: BERTO LIU

## 2023-08-22 NOTE — PROGRESS NOTES
Virtual Visit Details    Type of service:  Video Visit   Video Start Time:  9:40 AM  Video End Time: 10:10 AM    Originating Location (pt. Location): Home    Distant Location (provider location):  On-site  Platform used for Video Visit: [place]

## 2023-08-22 NOTE — PROGRESS NOTES
[facility]  MEDICAL PROGRESS NOTE     CARE TEAM:    PCP- Aiden Resendez  Therapist- Started psychotherapy on 5/3/2023  Rheum- MD Karma and Pain-JUANITA Pike      Jamison is a 57 year old who uses the pronouns him, his.        Chief Concern     Follow up for depressed mood      Diagnoses     Major depressive disorder, recurrent, moderate  Generalized anxiety disorder, with panic  PTSD     Medical Diagnoses:  Chronic pain  Rheumatoid arthritis  Ankylosing spondylitis (HLA-B27 positive)  Lumbar degenerative disc disease  Hypertension  Hyperlipidemia     Assessment     Jamison is a 57 year old man with one prior suicide attempt many years ago and multiple psychiatric hospitalizations who is seen today for follow up for  MDD, JEFF, and PTSD in the context of significant medical co-morbidities including rheumatoid arthritis and chronic pain. Significant symptoms have included depressed mood, anhedonia, poor sleep, and avolition. Jamison is currently taking medical cannabis which might play a role in mood changes. Jamison was last seen in 7/11/2023 during which no changes were made.     Today, Jamison is doing well overall. Mood has been stable and he has been doing things that he enjoys. His main concern today is vivid nightmares when he forgets to take prazosin. He denies SI/HI. The MSE is notable for euthymic mood with restricted, appropriate affect. We explored the reasons why he misses the prazosin dose since nightmares do not occur when he takes it. The main reason for missing prazosin is falling asleep in the couch. We discussed to set an alarm to take medications at a specific time earlier in the evening and sleep hygiene measures included but not limited to going to bed at the same time.     We discussed the benefits vs risks of continuing liothyronine. We discussed that although liothyronine is used off label to augment the treatment of  depression, the literature about its long term use and its safety is limited. We discussed that based on his experience with  liothyronine and weighing the benefit (improvement in depression) vs the risk of limited data on safety, he may decide to continue taking liothyronine or to discontinue it. We discussed some adverse reactions of liothyronine including arrhythmia (6%), tachycardia (3%), hypotension (?2%), and myocardial infarction (?2%). Jamison has been on liothyronine for about 2 years and has not had any of these side effects. We discussed the need to monitor thyroid function at least once a year if Jamison decides to stay on liothyronine. Jamison decided to discontinue liothyronine.     Jamison reported some worsening tinnitus, motion sickness and headache today for which it was recommended to follow up wit his PCP.     We discussed to continue behavioral activation to address avolition.    Future considerations:  -Levomilnacipran  -TCAs  -In the process of starting a new medication for rheumatoid arthritis- will follow up on any potential drug interactions with the new medication    Psychotropic Drug Interactions:  [PSYCHCLINICDDI]  ADDITIVE SEROTONERGIC: fluoxetine, Suboxone  ADDITIVE QTc: fluoxetine, quetiapine, Suboxone  ADDITIVE ORTHOSTASIS: prazosin, buprenorphine  ADDITIVE CNS/RESPIRATORY DEPRESSION: Suboxone, quetiapine, oxycodone  Management: routine monitoring    [place] was checked today: indicates that controlled prescriptions have been filled as prescribed    Risk Statements:   Treatment Risk- Risks, benefits, alternatives and potential adverse effects have been discussed and are understood.   Safety Risk-Les did not appear to be an imminent safety risk to self or others.     Plan     1) Medications:   - Continue fluoxetine (PROZAC) 80 mg daily  - Continue quetiapine (SEROQUEL) 25 mg at bedtime  - Decrease liothyronine (CYTOMEL) from 25 mcg daily to 10 mcg  daily for 8 weeks then discontinue   - Continue  prazosin (MINIPRESS) 1 mg at bedtime      Other:  - Suboxone  - Methotrexate  - Oxycodone (per pain clinic for pain flare short term)  - Medical cannabis (per pain clinic)     2) Therapy- Recommended. Going well.      3) Next Due   Labs - AP labs last ordered on 6/15/2023 but not completed - encouraged patient to complete these labs prior to the next visit  EKG - last EKG 12/8/2022.  Rating scales- AIMS, will complete during next in person visit     4) Referral -   - Patient will follow up sleep medicine  - did not see them       5) Other:   Discussed to follow up with PCP for tinnitus and headache  Discussed to set an alarm to take medications at a specific time and sleep hygiene measures     6) RTC: 8 weeks       Pertinent Background                                                   [most recent eval 07/11/23]     Copied from prior notes and reviewed with patient    Originally, diagnosed with depression and anxiety in 2015 but first experienced symptoms of depression in middle school. He most recently had a significant worsening of symptoms in 2017 after he was diagnosed with rheumatoid arthritis, ankylosing spondylitis ended up with a knee injury and was no longer able to work or run which he used as his primary coping mechanism.     Pertinent Items Include: suicidal ideation, SIB [cutting], multiple psychotropic trials , severe med reaction, psych hosp (<3), SUBSTANCE USE: alcohol, substance use treatment  and Major Medical Problems (Rheumatoid Arthritis and Ankylosing Spondylitis)     Subjective   Les was last seen in 7/11/2023 during which no changes were made.  Since last visit    Notes he has been fine.   Mood- it's been alright- Notes he has been busy-has been keeping his mind off things  Has been doing stuff that he likes, taking dogs out for a walk, playing with his drone  Meds: No side effects. Cytomel- does not remember discussing with prior provider to decrease the dose and discontinue it  Sleep: Has  "been sleeping, still getting really bad dreams when he does not take prazosin- if he remembers to take quetiapine and prazosin it helps witht he dreams. He sometimes forgets to take meds because he falls asleep in the couch. Reports having a bizarre experience about 3 am last night during which he was dreaming that was going to the bathroom and it was like he was going for a long time. He eventually wet the bed. Reports scary vivid dreams of stuff that happened earlier in childhood when he does not take prazosin.   Stress- Denies   Health- No new concerns  SI: Denies  HI: Denies  Therapy going well       Recent Psych Symptoms:   Elevated:  none  Psychosis:  none    Current Social History:  Financial/occupational: planning to attend [email or web address] for a degree in psychology- has a couple of credits left to complete degree; unable to work due to diagnosis of rheumatoid arthritis and ankylosing spondylitis, on disability   Living situation (partner, children, pets, etc): [place], house with wife and daughter 25 yo at home, son is in [place], two dogs - lost one  Social/spiritual support: Talks to  every two weeks, not many friends, family is supportive, likes walking and hiking  Feels safe at home: Yes    Pertinent Substance Use:   Alcohol: No   Cannabis: Yes: On medical cannabis  Tobacco: No  Caffeine:  No   Opioids: No   Narcan Kit current: N/A  Other substances: none    Medical Review of Systems:   Lightheadedness/orthostasis: None  Headaches: mild headache with nausea, feels motion sickness when he gets up, nausea, tinnitus louder  GI: None     Mental Status Exam     Alertness: alert  and oriented  Appearance: casually groomed  Behavior/Demeanor: cooperative, pleasant and calm, with fair  eye contact   Speech: normal and regular rate and rhythm  Language: no obvious problem  Psychomotor: normal or unremarkable  Mood:   \"it's been alright\"  Affect: restricted; congruent to: mood- yes, content- yes  Thought " "Process/Associations: unremarkable  Thought Content:  Reports none;  Denies suicidal ideation, violent ideation and delusions   Perception:  Reports none;  Denies hallucinations  Insight: adequate  Judgment: fair  Cognition: does  appear grossly intact; formal cognitive testing was not done  Gait and Station: N/A (telehealth)     Past Psych Med Trials     Copied from prior note and reviewed with patient    Medication  Dose   (mg) Effect  Dates of Use   Fluoxetine 40-80 Felt like was initially helpful 2016 - 2017 2022-2023   Sertraline 100 Effective initially, eventually self discontinued as not helpful 2018 - 7/2020   Duloxetine 30 BID Used to treat nerve pain; felt weird on it 2017   Bupropion  BID ineffective 2017 - 2019   Nortriptyline 50 For pain 2017 -  3/2019   Mirtazapine   Dissociated for entire first week after taking  Per MTM on 5/15/23: \"\"some kind of psychosis\". States that he was also taking oxycodone, alprazolam, fentanyl patches at the time\" 2012   Trazodone 50   2013             Hydroxyzine 25 QID prn For anxiety and panic attacks; not effective for severe anxiety 2015 - 2017   Buspirone 15 TID   2016 - 2017   Gabapentin 100 TID   2013 - 2016   Alprazolam 0.5 TID For anxiety 6404-1392   Diazepam 2 BID For anxiety 2016   Lorazepam 1 TID For anxiety 2016   Quetiapine  25-50   [id number]   Clonazepam 1 For anxiety 2016         Treatment Course and [place] Events Since   July 2023     -07/2023- Transfer of care-no med changes  -08/2023- Taper down and discontinue liothyronine       Vitals     There were no vitals taken for this visit.  Pulse Readings from Last 3 Encounters:   08/09/23 72   07/20/23 85   05/17/23 69     Wt Readings from Last 3 Encounters:   12/08/22 131.5 kg (290 lb)   05/02/22 122.7 kg (270 lb 8 oz)   03/16/22 125.3 kg (276 lb 3.2 oz)     BP Readings from Last 3 Encounters:   08/09/23 125/79   07/20/23 (!) 146/91   05/17/23 (!) 149/92         Medical History     ALLERGIES: Mirtazapine, " Hydrocodone, Mirtazapine, and Ms contin [morphine]    Patient Active Problem List   Diagnosis    CARDIOVASCULAR SCREENING; LDL GOAL LESS THAN 160    Obesity, Class I, BMI 30-34.9    Tear meniscus knee, right, initial encounter    Rheumatoid arthritis involving multiple sites, unspecified rheumatoid factor presence    Chronic pain    Right-sided thoracic back pain, unspecified chronicity    Generalized anxiety disorder    Panic attack    Ankylosing spondylitis (H)    Moderate major depression (H)    Immunocompromised (H)    Essential hypertension with goal blood pressure less than 140/90    Suicidal ideation    Insomnia due to other mental disorder    Major depressive disorder, recurrent episode, severe with mixed features (H)    Chronic back pain greater than 3 months duration    Contact dermatitis and eczema    Dermatitis    Drug dependence (H)    Encounter for screening colonoscopy    Esophageal reflux    Hematuria    Hyperlipidemia    Lumbar radiculopathy    Plantar fascial fibromatosis    Sprain of sacroiliac region    Overweight    Nonintractable migraine    Narcotic abuse, continuous (H)    Morbid obesity (H)    Chronic bilateral low back pain without sciatica        Medications     Current Outpatient Medications   Medication Sig Dispense Refill    atorvastatin (LIPITOR) 20 MG tablet Take 1 tablet (20 mg) by mouth daily 90 tablet 0    buprenorphine (SUBUTEX) 8 MG SUBL sublingual tablet Place 1 tablet (8 mg) under the tongue 3 times daily Fill 8/23/2023 and start 8/26/2023. 30 day script. Early fill as I gave him permission to begin taking 3/day with last script on 8/9/2023 90 tablet 0    buprenorphine (SUBUTEX) 8 MG SUBL sublingual tablet Place 1 tablet (8 mg) under the tongue 2 times daily Max 2 tabs per day. Fill 08/03/23 to start 08/05/23 60 tablet 0    FLUoxetine (PROZAC) 40 MG capsule Take 2 capsules (80 mg) by mouth daily 180 capsule 0    folic acid (FOLVITE) 1 MG tablet Take 3 tablets (3,000 mcg) by  mouth daily 270 tablet 0    liothyronine (CYTOMEL) 25 MCG tablet Take 1 tablet (25 mcg) by mouth daily 30 tablet 1    medical cannabis (Patient's own supply) See Admin Instructions (The purpose of this order is to document that the patient reports taking medical cannabis.  This is not a prescription, and is not used to certify that the patient has a qualifying medical condition.)     Pills PRN   Lozenges PRN      methotrexate 2.5 MG tablet Take 8 tablets (20 mg) by mouth every 7 days 96 tablet 0    naloxone (NARCAN) 4 MG/0.1ML nasal spray Spray 1 spray (4 mg) into one nostril alternating nostrils as needed for opioid reversal every 2-3 minutes until assistance arrives 0.2 mL 1    prazosin (MINIPRESS) 1 MG capsule Take 1 capsule (1 mg) by mouth At Bedtime 90 capsule 0    QUEtiapine (SEROQUEL) 25 MG tablet Take 1 tablet (25 mg) by mouth At Bedtime 90 tablet 0    tiZANidine (ZANAFLEX) 4 MG tablet Take 1 tablet (4 mg) by mouth 3 times daily as needed for muscle spasms 75 tablet 2    tiZANidine (ZANAFLEX) 4 MG tablet Take 1 tablet (4 mg) by mouth 3 times daily as needed for muscle spasms 45 tablet 1    tofacitinib (XELJANZ XR) 11 MG 24 hr tablet Take 1 tablet (11 mg) by mouth daily Hold for signs of infection, then seek medical attention. (Patient not taking: Reported on 8/18/2023) 30 tablet 5        Labs and Data         5/11/2023     8:43 AM 6/12/2023     8:06 PM 7/12/2023     5:00 PM   PROMIS-10 Total Score w/o Sub Scores   PROMIS TOTAL - SUBSCORES 13 13 23         3/31/2022    10:00 AM 6/12/2023     8:05 PM 7/12/2023     5:00 PM   CAGE-AID Total Score   Total Score 3 0 0   Total Score MyChart  0 (A total score of 2 or greater is considered clinically significant)          5/11/2023     8:41 AM 6/12/2023     8:04 PM 7/12/2023     5:00 PM   PHQ-9 SCORE   PHQ-9 Total Score MyChart 12 (Moderate depression) 15 (Moderately severe depression)    PHQ-9 Total Score 12 15 8         6/12/2023     8:04 PM 6/12/2023     8:06 PM  7/12/2023     5:00 PM   JEFF-7 SCORE   Total Score 8 (mild anxiety) 8 (mild anxiety)    Total Score 8 8 5     Recent Labs   Lab Test 12/08/22  1427 12/08/22  1425 10/11/21  1123   GLC  --  96 97   A1C 5.5  --  5.2     Recent Labs   Lab Test 12/08/22  1425 10/11/21  1123   CHOL 224* 209*   TRIG 230* 225*   * 134*   HDL 25* 30*     Recent Labs   Lab Test 05/17/23 1007 12/08/22  1425 08/31/22  1405 10/11/21  1123   AST 30 32   < > 37   ALT 48 49   < > 47   ALKPHOS  --  112  --  102    < > = values in this interval not displayed.     Recent Labs   Lab Test 05/17/23 1007 12/08/22 1425 08/31/22  1405 10/11/21  1123   WBC 6.4 7.7 6.0 7.0   ANEUTAUTO  --  5.3 3.2 4.4   HGB 15.5 15.6 15.1 16.2    225 213 232        PROVIDER: Zain Hernández MD    Level of Medical Decision Making:   - At least 1 chronic problem that is not stable  - Engaged in prescription drug management during visit (discussed any medication benefits, side effects, alternatives, etc.)           Psychiatry Individual Psychotherapy Note   Psychotherapy start time - 9:54 AM  Psychotherapy end time - 10:10 AM  Date last reviewed with patient - 08/22/23  Subjective: This supportive psychotherapy session addressed issues related to goals of therapy and current psychosocial stressors. Patient's reaction: Action in the context of mental status appropriate for ambulatory setting.  Progress: action  Interactive complexity indicated? No  Plan: RTC in timeframe noted above  Psychotherapy services during this visit included myself and the patient.   Treatment Plan      SYMPTOMS; PROBLEMS   MEASURABLE GOALS;    FUNCTIONAL IMPROVEMENT / GAINS INTERVENTIONS DISCHARGE CRITERIA   Depression: insomnia and avolition   Choose at least one enjoyable activity and start behavioral activation and improve sleep hygiene Supportive / psychodynamic marked symptom improvement       Patient staffed in clinic with Dr. De Santiago who will sign the note.  Supervisor is   Reji

## 2023-08-23 RX ORDER — LIOTHYRONINE SODIUM 5 UG/1
10 TABLET ORAL DAILY
Qty: 60 TABLET | Refills: 1 | Status: SHIPPED | OUTPATIENT
Start: 2023-08-23 | End: 2023-10-24

## 2023-08-23 NOTE — PATIENT INSTRUCTIONS
"It was nice seeing you today     Treatment Plan Today:      1) Medications-Decrease liothyronine from 25 mcg (one 25 mcg tablet) daily to 10 mcg (two 5 mcg tablets) daily for 2 months then discontinue.      2) Please follow up with your primary care provider to discuss worsening tinnitus, motion sickness and headache    3) Please set an alarm to take medications earlier (say at 8 pm) so as not to fall asleep in the couch and miss medications.      4) Please follow the sleep hygiene tips below    \"Sleep hygiene\" means having good sleep habits.  Follow these tips to sleep better at night:     Get on a schedule. Go to bed and get up at about the same time every day.  Listen to your body. Only try to sleep when you actually feel tired or sleepy.  Be patient. If you haven't been able to get to sleep after about 30 minutes or more, get up and do something calming or boring until you feel sleepy. Then return to bed and try again.  Don't have caffeine (coffee, tea, cola drinks, chocolate and some medicines), alcohol or nicotine (cigarettes). These can make it harder for you to fall asleep and stay asleep.  Use your bed for sleeping only. That means no TV, computer or homework in bed, especially during the evening and before bedtime.  Don't nap during the day. If you must nap, make sure it is for less than 20 minutes.  Create sleep rituals that remind your body it is time to sleep. Examples include breathing exercises, stretching or reading a book.  Avoid all electronic media (smart phone, computer, tablet) within 2 hours of bed time. The \"blue light\" in these devices activates the part of the brain that keeps you awake.  Dim the lights at night.  Get early morning sources of light (walk in the sunshine) to help set sleep patterns at night.  Try a bath or shower before bed. Having a warm bath 1 to 2 hours before bedtime can help you feel sleepy. Hot baths can make you alert, so be mindful of the temperature.  Don't watch the " "clock. Checking the clock during the night can wake you up. It can also lead to negative thoughts such as, \"I will never fall asleep,\" which can increase anxiety and sleeplessness.  Use a sleep diary. Track your sleep schedule to know your sleep patterns and to see where you can improve.  Get regular exercise every day. Try not to do heavy exercise in the 4 hours before bedtime.  Eat a healthy, balanced diet.  Try eating a light, healthy snack before bed, but avoid eating a heavy meal.  Create the right sleeping area. A cool, dark, quiet room is best. If needed, try earplugs, fans and blackout curtains.  Keep your daytime routine the same even if you have a bad night sleep. Avoiding activities the next day can make it harder to sleep.     5) Please return to clinic in 8 weeks     6) Crisis numbers are below and clinic after hours number is [phone]      **For crisis resources, please see the information at the end of this document**   Patient Education    Thank you for coming to the [facility] [place] [facility].     Lab Testing:  If you had lab testing today and your results are reassuring or normal they will be mailed to you or sent through [employer] within 7 days. If the lab tests need quick action we will call you with the results. The phone number we will call with results is # [phone]. If this is not the best number please call our clinic and change the number.     Medication Refills:  If you need any refills please call your pharmacy and they will contact us. Our fax number for refills is [phone].   Three business days of notice are needed for general medication refill requests.   Five business days of notice are needed for controlled substance refill requests.   If you need to change to a different pharmacy, please contact the new pharmacy directly. The new pharmacy will help you get your medications transferred.     Contact Us:  Please call [phone] during " business hours (8-5:00 M-F).   If you have medication related questions after clinic hours, or on the weekend, please call [phone].     Financial Assistance [phone]   Medical Records [phone]       MENTAL HEALTH CRISIS RESOURCES:  For a emergency help, please call 911 or go to the nearest Emergency Department.     Emergency Walk-In Options:   EmPATH Unit @ [facility] ([place]): [phone] - Specialized mental health emergency area designed to be calming  [facility] ([place]): [phone]  [facility] ([place]): [phone]  [facility] ([place]): [phone]    [facility] Crisis Information:   [place]: [phone]  [place]: [phone]  [place] ([place]) - Adult: [phone]     Child: [phone]  [place] - Adult: [phone]     Child: [phone]  [place]: [phone]  List of all [facility] resources:   https://[place].gov/dhs/people-we-serve/adults/health-care/mental-health/resources/crisis-contacts.jsp    National Crisis Information:   Crisis Text Line: Text  [place]  to [address]  Suicide & Crisis Lifeline: 988  National Suicide Prevention Lifeline: 5-801-135-TALK (1-703.371.2960)       For online chat options, visit https://suicidepreventionlifeline.org/chat/  Poison Control Center: 1-993.967.5918  Trans Lifeline: 8-686-310-6595 - Hotline for transgender people of all ages  The Tu Project: 4-182-712-0284 - Hotline for LGBT youth     For Non-Emergency Support:   Fast Tracker: Mental Health & Substance Use Disorder Resources -   https://www.[employer].org/

## 2023-08-23 NOTE — TELEPHONE ENCOUNTER
Called [facility] this morning they have MD forms but are missing some information      Section 10 RX portion--- where we check off 11mg we need to write in manually Xeljanz (drug name)      Also section 11 needs MD sig and date           BONG Pressley, [facility]  Specialty Pharmacy Clinic Liaison     [facility] Special[facility]    [email or web address]     Phone: [phone]  Fax: [phone]

## 2023-08-23 NOTE — TELEPHONE ENCOUNTER
Added Xeljanz to medication prescription information and Dr. Parada signed missing portion.  Refaxed to [facility] and confirmed fax was sent successfully by [employer].    Renetta Swan RN

## 2023-08-24 ENCOUNTER — MYC MEDICAL ADVICE (OUTPATIENT)
Dept: RHEUMATOLOGY | Facility: CLINIC | Age: 58
End: 2023-08-24
Payer: COMMERCIAL

## 2023-08-24 DIAGNOSIS — M06.9 RHEUMATOID ARTHRITIS INVOLVING MULTIPLE SITES, UNSPECIFIED WHETHER RHEUMATOID FACTOR PRESENT (H): ICD-10-CM

## 2023-08-26 ENCOUNTER — MYC MEDICAL ADVICE (OUTPATIENT)
Dept: PALLIATIVE MEDICINE | Facility: CLINIC | Age: 58
End: 2023-08-26
Payer: COMMERCIAL

## 2023-08-29 NOTE — TELEPHONE ENCOUNTER
Spoke to [facility] phone [phone]    They are missing pt page 6--- sent email to pt with page to complete           PT ACCOUNT [id number]      Writer faxed ESBATech dx code as suggested by BONG Sue, [facility]  Specialty Pharmacy Clinic Liaison     [facility]    [email or web address]     Phone: [phone]  Fax: [phone]

## 2023-08-31 ENCOUNTER — MYC REFILL (OUTPATIENT)
Dept: FAMILY MEDICINE | Facility: CLINIC | Age: 58
End: 2023-08-31
Payer: COMMERCIAL

## 2023-08-31 DIAGNOSIS — E78.5 DYSLIPIDEMIA: ICD-10-CM

## 2023-09-01 RX ORDER — ATORVASTATIN CALCIUM 20 MG/1
20 TABLET, FILM COATED ORAL DAILY
Qty: 90 TABLET | Refills: 0 | Status: SHIPPED | OUTPATIENT
Start: 2023-09-01 | End: 2023-11-02

## 2023-09-01 NOTE — TELEPHONE ENCOUNTER
FREE DRUG APPLICATION APPROVED    Medication: XELJANZ XR 11 MG PO TB24  Program Name: XELSOURCE  Effective Date: 9/1/2023  Expiration Date: 12/31/2023  Pharmacy Filling the Rx: [facility] [id number]  Patient Notified: yes  Additional Information:           BONG Pressley, [facility]  Specialty Pharmacy Clinic Liaison     [facility] Specialty    [email or web address]     Phone: [phone]  Fax: [phone]

## 2023-09-13 ENCOUNTER — DOCUMENTATION ONLY (OUTPATIENT)
Dept: BEHAVIORAL HEALTH | Facility: CLINIC | Age: 58
End: 2023-09-13
Payer: COMMERCIAL
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
## 2023-09-18 ENCOUNTER — VIRTUAL VISIT (OUTPATIENT)
Dept: PSYCHOLOGY | Facility: CLINIC | Age: 58
End: 2023-09-18
Payer: COMMERCIAL

## 2023-09-18 DIAGNOSIS — F33.1 MAJOR DEPRESSIVE DISORDER, RECURRENT EPISODE, MODERATE (H): Primary | ICD-10-CM

## 2023-09-18 PROCEDURE — 90834 PSYTX W PT 45 MINUTES: CPT | Mod: VID | Performed by: PSYCHOLOGIST

## 2023-09-18 NOTE — PROGRESS NOTES
"[facility]   Mental Health & Addiction Services     Progress Note - Initial Visit    Patient  Name:  Jailene Breen Date: 23         Service Type: Individual     Visit Start Time: 11:00am Visit End Time: 11:52am     Visit #: 1 52 minutes    Attendees: Client attended alone    Service Modality:  Video Visit:      Provider verified identity through the following two step process.  Patient provided:  Patient photo and Patient     Telemedicine Visit: The patient's condition can be safely assessed and treated via synchronous audio and visual telemedicine encounter.      Reason for Telemedicine Visit: Services only offered telehealth    Originating Site (Patient Location): Patient's home    Distant Site (Provider Location): Provider Remote Setting- Home Office    Consent:  The patient/guardian has verbally consented to: the potential risks and benefits of telemedicine (video visit) versus in person care; bill my insurance or make self-payment for services provided; and responsibility for payment of non-covered services.     Patient would like the video invitation sent by:  Send to e-mail at: [email or web address]    Mode of Communication:  Video Conference via Amwell    Distant Location (Provider):  Off-site    As the provider I attest to compliance with applicable laws and regulations related to telemedicine.       DATA:   Interactive Complexity: No   Crisis: No     Presenting Concerns/  Current Stressors:   Client struggles with intense depression and anxiety.      Reason for Referral:  Cl has had student therapists in the past, \"At least three of them.\"  \"This depression I have to keep dealing with.  I get into these moods where I feel like I can't do nothing.  I lose interest in the things I usually like doing.  I just don't want to do anything.  It's hard for me to just go outside and go for a walk.  My daughter, yesterday, had coaxed me just to go for a walk.  It felt good when I " "was done.  I did notice that in the Fall and Winter time it gets worse.  I have an idea of the sunlight maybe.  I've been having some kine of weird dreams again.  A couple days ago I kind of had this strange feeling if I was really awake or if I was just dreaming.  One time I was in a blackout for a week.  I went to work, I went to therapy, and I think I lost about a week.  Ever since then I felt a certain way and I feel that every-now-and-then now.  It felt weird.  A couple days ago I felt the way I did back then.\"    \"I did enjoy working.  You work hard at work and you earn your weekends.  I get up to do something and I can't do it very long.  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  I'm an artist, too.\"    \"In 2016 I found out my diagnosis (RA).  I've been dealing with depression for a long time (2017).\"  \"Once I hurt my knee, I couldn't run anymore.  Now, it hurts to walk.\"    ASSESSMENT:  Mental Status Assessment:  Appearance:   Appropriate   Eye Contact:   Fair   Psychomotor Behavior: Normal   Attitude:   Cooperative  Pleasant  Orientation:   All  Speech   Rate / Production: Talkative   Volume:  Normal   Mood:    Normal Sad   Affect:    Appropriate   Thought Content:  Clear   Thought Form:  Coherent   Insight:    Good  and Fair       Safety Issues and Plan for Safety and Risk Management:   Verbal Safety Check:  Patient denies current fears or concerns for personal safety.  Patient denies current or recent suicidal ideation or behaviors.  Patient denies current or recent homicidal ideation or behaviors.  Patient denies current or recent self injurious behavior or ideation.  Patient denies other safety concerns.  Recommended that patient call 911 or go to the local ED should there be a change in any of these risk factors.  Patient reports there are no firearms in the house.     Diagnostic Criteria:  Major Depressive Disorder  A) Recurrent episode(s) - symptoms have been " present during the same 2-week period and represent a change from previous functioning 5 or more symptoms (required for diagnosis)   - Depressed mood. Note: In children and adolescents, can be irritable mood.     - Diminished interest or pleasure in all, or almost all, activities.    - Psychomotor activity retardation.    - Fatigue or loss of energy.    - Diminished ability to think or concentrate, or indecisiveness.    - Recurrent thoughts of death (not just fear of dying), recurrent suicidal ideation without a specific plan, or a suicide attempt or a specific plan for committing suicide.   B) The symptoms cause clinically significant distress or impairment in social, occupational, or other important areas of functioning  C) The episode is not attributable to the physiological effects of a substance or to another medical condition  D) The occurence of major depressive episode is not better explained by other thought / psychotic disorders  E) There has never been a manic episode or hypomanic episode      DSM5 Diagnoses: (Sustained by DSM5 Criteria Listed Above)  Diagnoses: 296.32 (F33.1) Major Depressive Disorder, Recurrent Episode, Moderate With melancholic features  Psychosocial & Contextual Factors: Client is on disability and does not work; Multiple health concerns  Assessments completed prior to visit:  The following assessments were completed by patient for this visit:  PHQ9:       1/5/2023     9:54 AM 2/9/2023     9:11 AM 3/30/2023     8:40 AM 5/11/2023     8:41 AM 6/12/2023     8:04 PM 7/12/2023     5:00 PM 9/18/2023    10:29 AM   PHQ-9 SCORE   PHQ-9 Total Score MyChart 18 (Moderately severe depression) 17 (Moderately severe depression) 11 (Moderate depression) 12 (Moderate depression) 15 (Moderately severe depression)  18 (Moderately severe depression)   PHQ-9 Total Score 18 17 11 12 15 8 18       Intervention:   Psychoeducation; Skill review/identification & recommendation; Pattern recognition; Socratic  "Questioning; Active listening, validation, and other rapport building skills; Administer and explain screeners; Safety Check; Answered questions; Scheduled future session  Collateral Reports Completed:  Not Applicable      PLAN: (Homework, other):  1. Provider will continue Diagnostic Assessment.  Patient was given the following to do until next session:  Client will think of therapeutic goals for next session.    2. Provider recommended the following referrals: None at this time.      3.  Suicide Risk and Safety Concerns were assessed for Jailene Breen.    Patient meets the following risk assessment and triage:  Conducted a verbal Safety Check based on PHQ-9.  Client stated he only has \"wishes to not be around\" at times and that he has no plan or intent.      Haim Quezada PsyD  September 18, 2023  "

## 2023-09-24 ENCOUNTER — MYC MEDICAL ADVICE (OUTPATIENT)
Dept: PALLIATIVE MEDICINE | Facility: CLINIC | Age: 58
End: 2023-09-24
Payer: COMMERCIAL

## 2023-09-24 DIAGNOSIS — M51.369 DDD (DEGENERATIVE DISC DISEASE), LUMBAR: ICD-10-CM

## 2023-09-24 DIAGNOSIS — M54.59 LUMBAR FACET JOINT PAIN: ICD-10-CM

## 2023-09-24 DIAGNOSIS — M25.50 MULTIPLE JOINT PAIN: ICD-10-CM

## 2023-09-24 DIAGNOSIS — M25.551 HIP PAIN, RIGHT: ICD-10-CM

## 2023-09-24 DIAGNOSIS — F11.20 UNCOMPLICATED OPIOID DEPENDENCE (H): ICD-10-CM

## 2023-09-24 DIAGNOSIS — F11.90 CHRONIC, CONTINUOUS USE OF OPIOIDS: ICD-10-CM

## 2023-09-24 DIAGNOSIS — M05.79 RHEUMATOID ARTHRITIS INVOLVING MULTIPLE SITES WITH POSITIVE RHEUMATOID FACTOR (H): ICD-10-CM

## 2023-09-24 DIAGNOSIS — M47.817 SPONDYLOSIS WITHOUT MYELOPATHY OR RADICULOPATHY, LUMBOSACRAL REGION: ICD-10-CM

## 2023-09-24 DIAGNOSIS — M45.7 ANKYLOSING SPONDYLITIS OF LUMBOSACRAL REGION (H): ICD-10-CM

## 2023-09-25 RX ORDER — BUPRENORPHINE 8 MG/1
8 TABLET SUBLINGUAL 2 TIMES DAILY
Qty: 60 TABLET | Refills: 0 | Status: CANCELLED | OUTPATIENT
Start: 2023-09-25

## 2023-09-25 RX ORDER — BUPRENORPHINE 8 MG/1
8 TABLET SUBLINGUAL 3 TIMES DAILY
Qty: 90 TABLET | Refills: 0 | Status: SHIPPED | OUTPATIENT
Start: 2023-09-25 | End: 2023-11-02

## 2023-09-25 NOTE — TELEPHONE ENCOUNTER
Received call from patient requesting refill(s) of buprenorphine (SUBUTEX) 8 MG SUBL sublingual tablet    Last dispensed from pharmacy on 08.27.2023    Patient's last office/virtual visit by prescribing provider on 08.09.2023  Next office/virtual appointment scheduled for 11.15.2023    UDT/CSA 05.17.2023    E[facility]prescribe to      [id number] - [place], [place] - [address] AT [address],pharmacy    Will route to [facility] for review and preparation of prescription(s).

## 2023-09-25 NOTE — TELEPHONE ENCOUNTER
Per patient Daryahart message:  Jamison MONTERO Pain Nurse (supporting Susana Pike, APRN CNP)Yesterday (12:01 PM)       Hello could you please refill my buprenorphine and send to [facility] pharmacy please. Thank you!

## 2023-09-25 NOTE — TELEPHONE ENCOUNTER
Signed Prescriptions:                        Disp   Refills    buprenorphine (SUBUTEX) 8 MG SUBL sublingu*90 tab*0        Sig: Place 1 tablet (8 mg) under the tongue 3 times daily           Fill now. Start 9/26/2023. 30 day script.  Authorizing Provider: SUSANA PETTY        Reviewed [place]  September 25, 2023- no concerning fills.    Susana GRANT, RN CNP, FNP  [facility]

## 2023-09-25 NOTE — TELEPHONE ENCOUNTER
Medication refill information reviewed.     subutex last due:  Fill 8/23/2023 and start 8/26/2023. 30 day script. Picked  up on 8/27/23  Due date:  9/26/23      Prescriptions prepped for review.     Cha RN-BSN  [facility]

## 2023-09-26 ENCOUNTER — MYC MEDICAL ADVICE (OUTPATIENT)
Dept: FAMILY MEDICINE | Facility: CLINIC | Age: 58
End: 2023-09-26
Payer: COMMERCIAL

## 2023-09-26 ENCOUNTER — NURSE TRIAGE (OUTPATIENT)
Dept: FAMILY MEDICINE | Facility: CLINIC | Age: 58
End: 2023-09-26
Payer: COMMERCIAL

## 2023-09-26 NOTE — TELEPHONE ENCOUNTER
Nurse Triage SBAR    Is this a 2nd Level Triage? NO    Situation: Feeling like he has to work harder to take deep breath x several years, worse in the last month.    Background: Pt states he has felt like he's had to work harder to take a deep breath x several years. Pt has been evaluated for this in the past and had a pulmonary function test ~2016. All evaluations and testing were inconclusive per pt. Pt states within the last month he feels like this feeling comes and goes more. Pt speaking in full sentences, pt states he has no difficulty completing daily tasks and has no SOB with activity. Pt denies chest pain, swelling, or sudden weight gain. Pt used to check is O2 sat at home, but can't find meter anymore. Pt denies feelings of dizziness.     Assessment: Further eval needed    Protocol Recommended Disposition:   See in Office Within 3 Days    Recommendation: Pt wants to be seen on a Monday. RN reviewed red flag symptoms, scheduled appointment for pt with PCP.           Does the patient meet one of the following criteria for ADS visit consideration? No    Gilda Benton RN    Reason for Disposition   MODERATE longstanding difficulty breathing (e.g., speaks in phrases, SOB even at rest, pulse 100-120) and SAME as normal    Additional Information   Negative: SEVERE difficulty breathing (e.g., struggling for each breath, speaks in single words, pulse > 120)   Negative: Breathing stopped and hasn't returned   Negative: Choking on something   Negative: Bluish (or gray) lips or face   Negative: Difficult to awaken or acting confused (e.g., disoriented, slurred speech)   Negative: Passed out (i.e., fainted, collapsed and was not responding)   Negative: Wheezing started suddenly after medicine, an allergic food, or bee sting   Negative: Stridor (harsh sound while breathing in)   Negative: Slow, shallow and weak breathing   Negative: Sounds like a life-threatening emergency to the triager   Negative: Chest pain    "Negative: Wheezing (high pitched whistling sound) and previous asthma attacks or use of asthma medicines   Negative: Difficulty breathing and within 14 days of COVID-19 Exposure   Negative: Difficulty breathing and only present when coughing   Negative: Difficulty breathing and only from stuffy nose   Negative: Difficulty breathing and only from stuffy nose or runny nose from common cold   Negative: MODERATE difficulty breathing (e.g., speaks in phrases, SOB even at rest, pulse 100-120) of new-onset or worse than normal   Negative: Oxygen level (e.g., pulse oximetry) 90% or lower   Negative: Wheezing can be heard across the room   Negative: Drooling or spitting out saliva (because can't swallow)   Negative: Any history of prior \"blood clot\" in leg or lungs   Negative: Illness requiring prolonged bedrest in past month (e.g., immobilization, long hospital stay)   Negative: Hip or leg fracture (broken bone) in past month (or had cast on leg or ankle in past month)   Negative: Major surgery in the past month   Negative: Long-distance travel in past month (e.g., car, bus, train, plane; with trip lasting 6 or more hours)   Negative: Cancer treatment in past six months (or has cancer now)   Negative: Extra heartbeats, irregular heart beating, or heart is beating very fast (i.e., 'palpitations')   Negative: Fever > 103 F (39.4 C)   Negative: Fever > 101 F (38.3 C) and over 60 years of age   Negative: Fever > 100.0 F (37.8 C) and bedridden (e.g., nursing home patient, stroke, chronic illness, recovering from surgery)   Negative: Fever > 100.0 F (37.8 C) and diabetes mellitus or weak immune system (e.g., HIV positive, cancer chemo, splenectomy, organ transplant, chronic steroids)   Negative: Periods where breathing stops and then resumes normally and bedridden (e.g., nursing home patient, CVA)   Negative: Pregnant or postpartum (from 0 to 6 weeks after delivery)   Negative: Patient sounds very sick or weak to the triager   " "Negative: MILD difficulty breathing (e.g., minimal/no SOB at rest, SOB with walking, pulse < 100) of new-onset or worse than normal   Negative: Longstanding difficulty breathing (e.g., CHF, COPD, emphysema) and worse than normal   Negative: Longstanding difficulty breathing and not responding to usual therapy   Negative: Continuous (nonstop) coughing   Negative: Patient wants to be seen   Negative: Oxygen level (e.g., pulse oximetry) 91 to 94%    Answer Assessment - Initial Assessment Questions  1. RESPIRATORY STATUS: \"Describe your breathing?\" (e.g., wheezing, shortness of breath, unable to speak, severe coughing)       Hard to take deep breath, has to force self. Last couple weeks, seems like it's getting worse. Has gotten checked out to see what's going on, but they haven't found anything. Speaking in full sentences. Pulmonary Function test in 2016.   2. ONSET: \"When did this breathing problem begin?\"       A couple years  3. PATTERN \"Does the difficult breathing come and go, or has it been constant since it started?\"       Comes and goes  4. SEVERITY: \"How bad is your breathing?\" (e.g., mild, moderate, severe)     - MILD: No SOB at rest, mild SOB with walking, speaks normally in sentences, can lie down, no retractions, pulse < 100.     - MODERATE: SOB at rest, SOB with minimal exertion and prefers to sit, cannot lie down flat, speaks in phrases, mild retractions, audible wheezing, pulse 100-120.     - SEVERE: Very SOB at rest, speaks in single words, struggling to breathe, sitting hunched forward, retractions, pulse > 120       Moderate. Feels like needs extra effort, work harder to take a deep breath  5. RECURRENT SYMPTOM: \"Have you had difficulty breathing before?\" If Yes, ask: \"When was the last time?\" and \"What happened that time?\"       Comes and goes. Sometimes when occurring he   6. CARDIAC HISTORY: \"Do you have any history of heart disease?\" (e.g., heart attack, angina, bypass surgery, angioplasty)       " "no  7. LUNG HISTORY: \"Do you have any history of lung disease?\"  (e.g., pulmonary embolus, asthma, emphysema)      Pulmonary function test  8. CAUSE: \"What do you think is causing the breathing problem?\"       Not sure  9. OTHER SYMPTOMS: \"Do you have any other symptoms? (e.g., dizziness, runny nose, cough, chest pain, fever)      no  10. O2 SATURATION MONITOR:  \"Do you use an oxygen saturation monitor (pulse oximeter) at home?\" If Yes, ask: \"What is your reading (oxygen level) today?\" \"What is your usual oxygen saturation reading?\" (e.g., 95%)        no  11. PREGNANCY: \"Is there any chance you are pregnant?\" \"When was your last menstrual period?\"        no  12. TRAVEL: \"Have you traveled out of the country in the last month?\" (e.g., travel history, exposures)        no    Protocols used: Breathing Difficulty-A-[place]    "

## 2023-10-04 ENCOUNTER — MYC MEDICAL ADVICE (OUTPATIENT)
Dept: PSYCHIATRY | Facility: CLINIC | Age: 58
End: 2023-10-04
Payer: COMMERCIAL

## 2023-10-04 DIAGNOSIS — F33.1 MAJOR DEPRESSIVE DISORDER, RECURRENT EPISODE, MODERATE (H): ICD-10-CM

## 2023-10-04 RX ORDER — LIOTHYRONINE SODIUM 5 UG/1
10 TABLET ORAL DAILY
Qty: 60 TABLET | Refills: 1 | OUTPATIENT
Start: 2023-10-04

## 2023-10-05 PROBLEM — G89.29 CHRONIC BILATERAL LOW BACK PAIN WITHOUT SCIATICA: Status: RESOLVED | Noted: 2023-06-02 | Resolved: 2023-10-05

## 2023-10-05 PROBLEM — M54.50 CHRONIC BILATERAL LOW BACK PAIN WITHOUT SCIATICA: Status: RESOLVED | Noted: 2023-06-02 | Resolved: 2023-10-05

## 2023-10-20 ENCOUNTER — TELEPHONE (OUTPATIENT)
Dept: FAMILY MEDICINE | Facility: CLINIC | Age: 58
End: 2023-10-20
Payer: COMMERCIAL

## 2023-10-20 NOTE — TELEPHONE ENCOUNTER
Patient Quality Outreach    Patient is due for the following:   Depression  -  Depression follow-up visit      Topic Date Due    Hepatitis B Vaccine (1 of 3 - 3-dose series) Never done    Hepatitis A Vaccine (1 of 2 - Risk 2-dose series) Never done    Flu Vaccine (1) 09/01/2023    COVID-19 Vaccine (4 - 2023-24 season) 09/01/2023       Next Steps:   Schedule a office visit for depression action plan    Type of outreach:    Sent Class Messenger message.    Next Steps:  Reach out within 90 days via Class Messenger.    Max number of attempts reached: Yes. Will try again in 90 days if patient still on fail list.    Questions for provider review:    None           Tanisha Hernandez MA

## 2023-10-23 ENCOUNTER — TELEPHONE (OUTPATIENT)
Dept: PSYCHIATRY | Facility: CLINIC | Age: 58
End: 2023-10-23
Payer: COMMERCIAL

## 2023-10-23 NOTE — TELEPHONE ENCOUNTER
Dr. Hernández .    Patient is seeing you tomorrow. Please address refills for all his medications prescribed by you.    Shila Solomon on 10/23/2023 at 7:49 AM

## 2023-10-24 ENCOUNTER — VIRTUAL VISIT (OUTPATIENT)
Dept: PSYCHIATRY | Facility: CLINIC | Age: 58
End: 2023-10-24
Attending: PSYCHIATRY & NEUROLOGY
Payer: COMMERCIAL

## 2023-10-24 DIAGNOSIS — F33.1 MAJOR DEPRESSIVE DISORDER, RECURRENT EPISODE, MODERATE (H): Primary | ICD-10-CM

## 2023-10-24 DIAGNOSIS — F43.10 PTSD (POST-TRAUMATIC STRESS DISORDER): ICD-10-CM

## 2023-10-24 DIAGNOSIS — F99 INSOMNIA DUE TO OTHER MENTAL DISORDER: ICD-10-CM

## 2023-10-24 DIAGNOSIS — F41.1 GENERALIZED ANXIETY DISORDER WITH PANIC ATTACKS: ICD-10-CM

## 2023-10-24 DIAGNOSIS — F51.05 INSOMNIA DUE TO OTHER MENTAL DISORDER: ICD-10-CM

## 2023-10-24 DIAGNOSIS — F41.0 GENERALIZED ANXIETY DISORDER WITH PANIC ATTACKS: ICD-10-CM

## 2023-10-24 PROCEDURE — 99214 OFFICE O/P EST MOD 30 MIN: CPT | Mod: VID

## 2023-10-24 PROCEDURE — 90833 PSYTX W PT W E/M 30 MIN: CPT | Mod: VID

## 2023-10-24 RX ORDER — LANOLIN ALCOHOL/MO/W.PET/CERES
3 CREAM (GRAM) TOPICAL AT BEDTIME
Qty: 90 TABLET | Refills: 0 | Status: SHIPPED | OUTPATIENT
Start: 2023-10-24 | End: 2023-10-24

## 2023-10-24 RX ORDER — FLUOXETINE 40 MG/1
80 CAPSULE ORAL DAILY
Qty: 180 CAPSULE | Refills: 0 | Status: SHIPPED | OUTPATIENT
Start: 2023-10-24 | End: 2023-12-19

## 2023-10-24 RX ORDER — QUETIAPINE FUMARATE 50 MG/1
TABLET, FILM COATED ORAL
Qty: 249 TABLET | Refills: 0 | Status: SHIPPED | OUTPATIENT
Start: 2023-10-24 | End: 2023-12-19

## 2023-10-24 RX ORDER — PRAZOSIN HYDROCHLORIDE 1 MG/1
1 CAPSULE ORAL AT BEDTIME
Qty: 90 CAPSULE | Refills: 0 | Status: SHIPPED | OUTPATIENT
Start: 2023-10-24 | End: 2023-12-19

## 2023-10-24 RX ORDER — LANOLIN ALCOHOL/MO/W.PET/CERES
3 CREAM (GRAM) TOPICAL EVERY EVENING
Qty: 90 TABLET | Refills: 0 | Status: SHIPPED | OUTPATIENT
Start: 2023-10-24 | End: 2023-12-19

## 2023-10-24 ASSESSMENT — PATIENT HEALTH QUESTIONNAIRE - PHQ9
SUM OF ALL RESPONSES TO PHQ QUESTIONS 1-9: 15
SUM OF ALL RESPONSES TO PHQ QUESTIONS 1-9: 15
10. IF YOU CHECKED OFF ANY PROBLEMS, HOW DIFFICULT HAVE THESE PROBLEMS MADE IT FOR YOU TO DO YOUR WORK, TAKE CARE OF THINGS AT HOME, OR GET ALONG WITH OTHER PEOPLE: SOMEWHAT DIFFICULT

## 2023-10-24 ASSESSMENT — PAIN SCALES - GENERAL: PAINLEVEL: EXTREME PAIN (8)

## 2023-10-24 NOTE — PROGRESS NOTES
Provider note locked.     Virtual Visit Details    Type of service:  Video Visit   Video Start Time:  10:10 AM  Video End Time: 10:40 AM    Originating Location (pt. Location): Home    Distant Location (provider location):  On-site  Platform used for Video Visit: [employer]  Answers submitted by the patient for this visit:  Patient Health Questionnaire (Submitted on 10/24/2023)  If you checked off any problems, how difficult have these problems made it for you to do your work, take care of things at home, or get along with other people?: Somewhat difficult  PHQ9 TOTAL SCORE: 15

## 2023-10-24 NOTE — NURSING NOTE
Is the patient currently in the state of [place]? YES    Visit mode:VIDEO    If the visit is dropped, the patient can be reconnected by: VIDEO VISIT: Text to cell phone:   Telephone Information:   Mobile [phone]       Will anyone else be joining the visit? NO  (If patient encounters technical issues they should call [phone]    How would you like to obtain your AVS? MyChart    Are changes needed to the allergy or medication list? No    Reason for visit: RECHECK    Rosa LIU

## 2023-10-24 NOTE — PROGRESS NOTES
"   [facility]  MEDICAL PROGRESS NOTE     CARE TEAM:    PCP- Aiden Resendez  Therapist-  Haim Quezada PsyD   Rheum- MD Karma and Pain-JUANITA Pike      Jamison is a 58 year old who uses the pronouns him, his.        Diagnoses     Major depressive disorder, recurrent, moderate  Generalized anxiety disorder, with panic  PTSD     Medical Diagnoses:  Chronic pain  Rheumatoid arthritis  Ankylosing spondylitis (HLA-B27 positive)  Lumbar degenerative disc disease  Hypertension  Hyperlipidemia     Assessment     Jamison is a 57 year old man who is seen today for follow up for  MDD, JEFF, and PTSD in the context of significant medical co-morbidities including rheumatoid arthritis and chronic pain. Significant symptoms have included depressed mood, anhedonia, poor sleep, and avolition. Jamison is currently taking medical cannabis which might play a role in mood changes.      Today, Jamison reports worsening of depression, notably avolition, apathy, poor sleep, low self-worth, lack of purpose, hopeless, sense of loss. Also reports nightmares, intrusive memories, and reliving of the trauma. He denies panic attacks and notes that anxiety is well controlled. Symptoms have worsened in the context of chronic pain that is not well controlled. Financial stressors are also contributing to the presentation. Jamison notes that he is \"just waiting to die\" but denies active SI, planning or intent. The MSE is notable for blunted affect. Discussed to increase the dose of quetiapine to augment treatment of depression and Jamison agrees. Also discussed to start melatonin to help with sleep. Reviewed sleep hygiene. Discussed referral for behavioral activation to address avolition and apathy. Discussed light box use as well. Jamison could benefit from having more connections with people such as in a 55+ day program. Will ask our social work team to explore these support " options with him as well as transportation and financial resources.        Future considerations:  -Levomilnacipran  -TCAs  -Follow up on memory concerns- cognitive screen     Psychotropic Drug Interactions:  [PSYCHCLINICDDI]  ADDITIVE SEROTONERGIC: fluoxetine, Suboxone  ADDITIVE QTc: fluoxetine, quetiapine, Suboxone  ADDITIVE ORTHOSTASIS: prazosin, buprenorphine  ADDITIVE CNS/RESPIRATORY DEPRESSION: Suboxone, quetiapine, oxycodone  Management: routine monitoring    [place]: indicates that controlled prescriptions have been filled as prescribed    Risk Statements:   Treatment Risk- Risks, benefits, alternatives and potential adverse effects have been discussed and are understood.   Safety Risk-Les did not appear to be an imminent safety risk to self or others.     Plan     1) Medications:   -Increase quetiapine (Seroquel) as follows from 25 mg at bedtime :   Week 1: Take one 50 mg tablet (50 mg total) at bedtime and if tolerating it well   Week 2: Take two 50 mg tablets (100 mg total) at bedtime and if tolerating it well   From week 3 onwards: Take three 50 mg tablets (150 mg total) at bedtime   - Continue fluoxetine (PROZAC) 80 mg daily  - Continue prazosin (MINIPRESS) 1 mg at bedtime   - Start melatonin 3 mg 3-4 hours before desired bedtime     Light box use      Other:  - Suboxone  - Methotrexate  - Oxycodone (per pain clinic for pain flare short term)  - Medical cannabis (per pain clinic)     2) Therapy- Recommend behavioral activation.      3) Next Due   Labs - AP labs last ordered on 6/15/2023 but not completed - encouraged patient to complete these labs prior to the next visit  EKG - last EKG 12/8/2022.  Rating scales- AIMS, will complete during next in person visit     4) Referral - Behavioral activation        - SW for resources (55+ group? Financial resources? Transportation resources? )      5) Other: Care coordination with current therapist for scheduling of more sessions       Sleep  "hygiene        6) RTC: 4 weeks       Pertinent Background                                                   [most recent eval 07/11/23]     Copied from prior notes and reviewed with patient    Originally, diagnosed with depression and anxiety in 2015 but first experienced symptoms of depression in middle school. He most recently had a significant worsening of symptoms in 2017 after he was diagnosed with rheumatoid arthritis, ankylosing spondylitis ended up with a knee injury and was no longer able to work or run which he used as his primary coping mechanism.     Pertinent Items Include: suicidal ideation, SIB [cutting], multiple psychotropic trials , severe med reaction, psych hosp (<3), SUBSTANCE USE: alcohol, substance use treatment  and Major Medical Problems (Rheumatoid Arthritis and Ankylosing Spondylitis)     Subjective   Les was last seen on 8/22/2023 during which liothyronine taper was started    Since last visit  Things have been rough, has not spoken to anyone for a while  Depression getting worse with seasonal change, usually gets worse during Fall  Does not feel like doing anything, does not feel good about self- like a waste of space because can't work anymore, not contributing anymore to society, felt like he earned his weekend when he was working, like he deserved to play hard when worked hard  When he eventually does something he \"pays the price for it\"- helped in laws to clean but it caused pain  Used to have projects at home, used to run marathons, went from being extremely busy and active to nothing- big sense of loss  Used to paint and draw-arthritis prevents holding brush for too long  Just waiting to die; denies active SI, planning or intent  Does not have the patience or motivation to do painting   Going for walks with wife and dogs- enjoyable  Going to class kept mind busy but not able to afford tuition  Feels disconnected     PTSD- Has been bad- nightmares, intrusive memories, seems like " gets transported back to traumatic moment.  No panic attacks  Anxiety- pretty controlled    Sleep- not good- sporadic stays up all night a few days in a row then gets some sleep- trying to fall asleep but not able to, no racing thoughts, no impulsivity- sleep hygiene was initially helpful but not sure what changed    Stress- hard financially, would not be able to pay for gym/swimming membership    SI- Denies     Therapy- was not able to go with last therapy because was taking wife to the hospital    Pain - still a concern    Motion sickness/tinnitus/headache  still present once in a while, not as bad as before    Narcan kit- Yes    More forgetful for the last 6 months- not sure if depression has worsened during this period       Recent Psych Symptoms:   Elevated:  none  Psychosis:  none    Current Social History:  Financial/occupational: On disability. Was planning to complete studies at [employer] for a degree in psychology- has a couple of credits left to complete degree; unable to work due to diagnosis of rheumatoid arthritis and ankylosing spondylitis   Living situation (partner, children, pets, etc): [place], house with wife and daughter 27 yo at home, son is in [place], two dogs - lost one  Social/spiritual support: Talks to  every two weeks, not many friends, family is supportive, likes walking and hiking  Feels safe at home: Yes    Pertinent Substance Use:   Alcohol: No   Cannabis: Yes: On medical cannabis- has not used it for a while because can't afford it   Tobacco: No  Caffeine:  No   Opioids: No   Narcan Kit current: Yes  Other substances: none    Medical Review of Systems:   Lightheadedness/orthostasis: None  Headaches: less, happens once in a while   GI: None     Mental Status Exam     Alertness: alert  and oriented  Appearance: casually groomed  Behavior/Demeanor: cooperative, pleasant and calm, with poor eye contact   Speech: normal and regular rate and rhythm  Language: no obvious  "problem  Psychomotor: normal or unremarkable  Mood:   \"depressed\"  Affect: blunted and appropriate; congruent to: mood- yes, content- yes  Thought Process/Associations: unremarkable  Thought Content:  Reports none;  Denies suicidal ideation, violent ideation and delusions   Perception:  Reports none;  Denies hallucinations  Insight: adequate  Judgment: adequate for safety  Cognition: does  appear grossly intact; formal cognitive testing was not done  Gait and Station: N/A (telehealth)     Past Psych Med Trials     Copied from prior note and reviewed with patient    Medication  Dose   (mg) Effect  Dates of Use   Fluoxetine 40-80 Felt like was initially helpful 2016 - 2017 2022-2023   Sertraline 100 Effective initially, eventually self discontinued as not helpful 2018 - 7/2020   Duloxetine 30 BID Used to treat nerve pain; felt weird on it 2017   Bupropion  BID ineffective 2017 - 2019   Nortriptyline 50 For pain 2017 -  3/2019   Mirtazapine   Dissociated for entire first week after taking  Per MTM on 5/15/23: \"\"some kind of psychosis\". States that he was also taking oxycodone, alprazolam, fentanyl patches at the time\" 2012   Trazodone 50   2013             Hydroxyzine 25 QID prn For anxiety and panic attacks; not effective for severe anxiety 2015 - 2017   Buspirone 15 TID   2016 - 2017   Gabapentin 100 TID   2013 - 2016   Alprazolam 0.5 TID For anxiety 1804-7885   Diazepam 2 BID For anxiety 2016   Lorazepam 1 TID For anxiety 2016   Quetiapine  25-50   [id number]   Clonazepam 1 For anxiety 2016         Treatment Course and Davis Events Since   July 2023     -07/2023- Transfer of care-no med changes  -08/2023- Taper down and discontinue liothyronine  -10/2023- More depressed. Increased quetiapine from 25 mg at bedtime to 150 mg at bedtime; started melatonin       Vitals     There were no vitals taken for this visit.  Pulse Readings from Last 3 Encounters:   08/09/23 72   07/20/23 85   05/17/23 69     Wt Readings from " Last 3 Encounters:   12/08/22 131.5 kg (290 lb)   05/02/22 122.7 kg (270 lb 8 oz)   03/16/22 125.3 kg (276 lb 3.2 oz)     BP Readings from Last 3 Encounters:   08/09/23 125/79   07/20/23 (!) 146/91   05/17/23 (!) 149/92         Medical History     ALLERGIES: Mirtazapine, Hydrocodone, Mirtazapine, and Ms contin [morphine]    Patient Active Problem List   Diagnosis    CARDIOVASCULAR SCREENING; LDL GOAL LESS THAN 160    Obesity, Class I, BMI 30-34.9    Tear meniscus knee, right, initial encounter    Rheumatoid arthritis involving multiple sites, unspecified rheumatoid factor presence    Chronic pain    Right-sided thoracic back pain, unspecified chronicity    Generalized anxiety disorder    Panic attack    Ankylosing spondylitis (H)    Moderate major depression (H)    Immunocompromised (H24)    Essential hypertension with goal blood pressure less than 140/90    Suicidal ideation    Insomnia due to other mental disorder    Major depressive disorder, recurrent episode, severe with mixed features (H)    Chronic back pain greater than 3 months duration    Contact dermatitis and eczema    Dermatitis    Drug dependence (H)    Encounter for screening colonoscopy    Esophageal reflux    Hematuria    Hyperlipidemia    Lumbar radiculopathy    Plantar fascial fibromatosis    Sprain of sacroiliac region    Overweight    Nonintractable migraine    Narcotic abuse, continuous (H)    Morbid obesity (H)        Medications     Current Outpatient Medications   Medication Sig Dispense Refill    atorvastatin (LIPITOR) 20 MG tablet Take 1 tablet (20 mg) by mouth daily 90 tablet 0    buprenorphine (SUBUTEX) 8 MG SUBL sublingual tablet Place 1 tablet (8 mg) under the tongue 3 times daily Fill now. Start 9/26/2023. 30 day script. 90 tablet 0    FLUoxetine (PROZAC) 40 MG capsule Take 2 capsules (80 mg) by mouth daily 180 capsule 0    folic acid (FOLVITE) 1 MG tablet Take 3 tablets (3,000 mcg) by mouth daily 270 tablet 0    liothyronine  (CYTOMEL) 5 MCG tablet Take 2 tablets (10 mcg) by mouth daily 60 tablet 1    medical cannabis (Patient's own supply) See Admin Instructions (The purpose of this order is to document that the patient reports taking medical cannabis.  This is not a prescription, and is not used to certify that the patient has a qualifying medical condition.)     Pills PRN   Lozenges PRN      methotrexate 2.5 MG tablet Take 8 tablets (20 mg) by mouth every 7 days 96 tablet 0    naloxone (NARCAN) 4 MG/0.1ML nasal spray Spray 1 spray (4 mg) into one nostril alternating nostrils as needed for opioid reversal every 2-3 minutes until assistance arrives 0.2 mL 1    prazosin (MINIPRESS) 1 MG capsule Take 1 capsule (1 mg) by mouth At Bedtime 90 capsule 0    QUEtiapine (SEROQUEL) 25 MG tablet Take 1 tablet (25 mg) by mouth At Bedtime 90 tablet 0    tiZANidine (ZANAFLEX) 4 MG tablet Take 1 tablet (4 mg) by mouth 3 times daily as needed for muscle spasms 75 tablet 2    tiZANidine (ZANAFLEX) 4 MG tablet Take 1 tablet (4 mg) by mouth 3 times daily as needed for muscle spasms 45 tablet 1    tofacitinib (XELJANZ XR) 11 MG 24 hr tablet Take 1 tablet (11 mg) by mouth daily Hold for signs of infection, then seek medical attention. (Patient not taking: Reported on 8/18/2023) 30 tablet 5        Labs and Data         7/12/2023     5:00 PM 9/18/2023    10:31 AM 10/24/2023     9:47 AM   PROMIS-10 Total Score w/o Sub Scores   PROMIS TOTAL - SUBSCORES 23 14 15         3/31/2022    10:00 AM 6/12/2023     8:05 PM 7/12/2023     5:00 PM   CAGE-AID Total Score   Total Score 3 0 0   Total Score MyChart  0 (A total score of 2 or greater is considered clinically significant)          7/12/2023     5:00 PM 9/18/2023    10:29 AM 10/24/2023     9:45 AM   PHQ-9 SCORE   PHQ-9 Total Score MyChart  18 (Moderately severe depression) 15 (Moderately severe depression)   PHQ-9 Total Score 8 18 15         6/12/2023     8:04 PM 6/12/2023     8:06 PM 7/12/2023     5:00 PM   JEFF-7  SCORE   Total Score 8 (mild anxiety) 8 (mild anxiety)    Total Score 8 8 5     Recent Labs   Lab Test 12/08/22  1427 12/08/22  1425 10/11/21  1123   GLC  --  96 97   A1C 5.5  --  5.2     Recent Labs   Lab Test 12/08/22  1425 10/11/21  1123   CHOL 224* 209*   TRIG 230* 225*   * 134*   HDL 25* 30*     Recent Labs   Lab Test 05/17/23  1007 12/08/22  1425 08/31/22  1405 10/11/21  1123   AST 30 32   < > 37   ALT 48 49   < > 47   ALKPHOS  --  112  --  102    < > = values in this interval not displayed.     Recent Labs   Lab Test 05/17/23 1007 12/08/22  1425 08/31/22  1405 10/11/21  1123   WBC 6.4 7.7 6.0 7.0   ANEUTAUTO  --  5.3 3.2 4.4   HGB 15.5 15.6 15.1 16.2    225 213 232          PROVIDER: Zain Hernández MD    Level of Medical Decision Making:   - At least 1 chronic problem that is not stable  - Engaged in prescription drug management during visit (discussed any medication benefits, side effects, alternatives, etc.)           Psychiatry Individual Psychotherapy Note   Psychotherapy start time - 10:24 AM  Psychotherapy end time - 10:40 AM  Date last reviewed with patient - 10/24/23  Subjective: This supportive psychotherapy session addressed issues related to goals of therapy and current psychosocial stressors. Patient's reaction: Action in the context of mental status appropriate for ambulatory setting.  Progress: action  Interactive complexity indicated? No  Plan: RTC in timeframe noted above  Psychotherapy services during this visit included myself and the patient.   Treatment Plan      SYMPTOMS; PROBLEMS   MEASURABLE GOALS;    FUNCTIONAL IMPROVEMENT / GAINS INTERVENTIONS DISCHARGE CRITERIA   Depression: insomnia and avolition   Choose at least one enjoyable activity and start behavioral activation and improve sleep hygiene Supportive / psychodynamic marked symptom improvement       Patient staffed in clinic with Dr. Kirby who will sign the note.  Supervisor is Dr. Hunter.

## 2023-10-24 NOTE — PATIENT INSTRUCTIONS
"It was nice seeing you today     Treatment Plan Today:      1) Medications-   -Increase quetiapine (Seroquel) as follows:   Week 1: Take one 50 mg tablet (50 mg total) at bedtime and if tolerating it well   Week 2: Take two 50 mg tablets (100 mg total) at bedtime and if tolerating it well   From week 3 onwards: Take three 50 mg tablets (150 mg total) at bedtime   - Start melatonin 3 mg tablet: Take one tablet 3- 4 hours before your desired bedtime      2) Continue with talk therapy. Your current therapist will call you to schedule. A referral has been placed for behavioral activation therapy.     3) Start using a light box in the morning    4) \"Sleep hygiene\" means having good sleep habits.  Follow these tips to sleep better at night:     Get on a schedule. Go to bed and get up at about the same time every day.  Listen to your body. Only try to sleep when you actually feel tired or sleepy.  Be patient. If you haven't been able to get to sleep after about 30 minutes or more, get up and do something calming or boring until you feel sleepy. Then return to bed and try again.  Don't have caffeine (coffee, tea, cola drinks, chocolate and some medicines), alcohol or nicotine (cigarettes). These can make it harder for you to fall asleep and stay asleep.  Use your bed for sleeping only. That means no TV, computer or homework in bed, especially during the evening and before bedtime.  Don't nap during the day. If you must nap, make sure it is for less than 20 minutes.  Create sleep rituals that remind your body it is time to sleep. Examples include breathing exercises, stretching or reading a book.  Avoid all electronic media (smart phone, computer, tablet) within 2 hours of bed time. The \"blue light\" in these devices activates the part of the brain that keeps you awake.  Dim the lights at night.  Get early morning sources of light (walk in the sunshine) to help set sleep patterns at night.  Try a bath or shower before bed. " "Having a warm bath 1 to 2 hours before bedtime can help you feel sleepy. Hot baths can make you alert, so be mindful of the temperature.  Don't watch the clock. Checking the clock during the night can wake you up. It can also lead to negative thoughts such as, \"I will never fall asleep,\" which can increase anxiety and sleeplessness.  Use a sleep diary. Track your sleep schedule to know your sleep patterns and to see where you can improve.  Get regular exercise every day. Try not to do heavy exercise in the 4 hours before bedtime.  Eat a healthy, balanced diet.  Try eating a light, healthy snack before bed, but avoid eating a heavy meal.  Create the right sleeping area. A cool, dark, quiet room is best. If needed, try earplugs, fans and blackout curtains.  Keep your daytime routine the same even if you have a bad night sleep. Avoiding activities the next day can make it harder to sleep.     5) Our social work team will reach out to explore supportive resources    6) Please return to clinic in 4 weeks     7) Crisis numbers are below and clinic after hours number is [phone]      **For crisis resources, please see the information at the end of this document**   Patient Education    Thank you for coming to the [facility] [place] [facility].     Lab Testing:  If you had lab testing today and your results are reassuring or normal they will be mailed to you or sent through [employer] within 7 days. If the lab tests need quick action we will call you with the results. The phone number we will call with results is # [phone]. If this is not the best number please call our clinic and change the number.     Medication Refills:  If you need any refills please call your pharmacy and they will contact us. Our fax number for refills is [phone].   Three business days of notice are needed for general medication refill requests.   Five business days of notice are needed for controlled " substance refill requests.   If you need to change to a different pharmacy, please contact the new pharmacy directly. The new pharmacy will help you get your medications transferred.     Contact Us:  Please call [phone] during business hours (8-5:00 M-F).   If you have medication related questions after clinic hours, or on the weekend, please call [phone].     Financial Assistance [phone]   Medical Records [phone]       MENTAL HEALTH CRISIS RESOURCES:  For a emergency help, please call 911 or go to the nearest Emergency Department.     Emergency Walk-In Options:   EmPATH Unit @ [facility] ([place]): [phone] - Specialized mental health emergency area designed to be calming  [facility] ([place]): [phone]  [facility] ([place]): [phone]  [facility] ([place]): [phone]    County Crisis Information:   [place]: [phone]  [place]: [phone]  [place] (COPE) - Adult: [phone]     Child: [phone]  [place] - Adult: [phone]     Child: [phone]  [place]: [phone]  List of all [facility] resources:   https://[place].[facility]/dhs/people-we-serve/adults/health-care/mental-health/resources/crisis-contacts.jsp    National Crisis Information:   Crisis Text Line: Text  [place]  to 837925  Suicide & Crisis Lifeline: 988  National Suicide Prevention Lifeline: 0-486-619-TALK (1-298.831.8167)       For online chat options, visit https://suicidepreventionlifeline.org/chat/  Poison Control Center: 1-763.397.1638  Trans Lifeline: 1-308.790.8897 - Hotline for transgender people of all ages  The Tu Project: 6-874-094-0123 - Hotline for LGBT youth     For Non-Emergency Support:   Fast Tracker: Mental Health & Substance Use Disorder Resources -   https://www.[employer].org/

## 2023-10-31 ENCOUNTER — MYC MEDICAL ADVICE (OUTPATIENT)
Dept: PALLIATIVE MEDICINE | Facility: CLINIC | Age: 58
End: 2023-10-31
Payer: COMMERCIAL

## 2023-10-31 DIAGNOSIS — F11.90 CHRONIC, CONTINUOUS USE OF OPIOIDS: ICD-10-CM

## 2023-10-31 DIAGNOSIS — M25.551 HIP PAIN, RIGHT: ICD-10-CM

## 2023-10-31 DIAGNOSIS — M51.369 DDD (DEGENERATIVE DISC DISEASE), LUMBAR: ICD-10-CM

## 2023-10-31 DIAGNOSIS — M54.59 LUMBAR FACET JOINT PAIN: ICD-10-CM

## 2023-10-31 DIAGNOSIS — M25.50 MULTIPLE JOINT PAIN: ICD-10-CM

## 2023-10-31 DIAGNOSIS — M47.817 SPONDYLOSIS WITHOUT MYELOPATHY OR RADICULOPATHY, LUMBOSACRAL REGION: ICD-10-CM

## 2023-10-31 DIAGNOSIS — M45.7 ANKYLOSING SPONDYLITIS OF LUMBOSACRAL REGION (H): ICD-10-CM

## 2023-10-31 DIAGNOSIS — M05.79 RHEUMATOID ARTHRITIS INVOLVING MULTIPLE SITES WITH POSITIVE RHEUMATOID FACTOR (H): ICD-10-CM

## 2023-10-31 DIAGNOSIS — F11.20 UNCOMPLICATED OPIOID DEPENDENCE (H): ICD-10-CM

## 2023-10-31 NOTE — TELEPHONE ENCOUNTER
Routing to provider to review.    Per chart review Pt last received #42- start 07/06/23- Last note: Oxycodone for pain flare short term     Mychart below from pt  Hello, pacheco had to stop taking my RA medication because pacheco been pretty sick with a fever this last week and the last couple of days have been real painful. Can you help me out with a short script for the pain and send to [facility] in [place]. Thank you and take care       Ida MONAE, RN Care Coordinator  [facility]  Pain Management

## 2023-11-02 NOTE — TELEPHONE ENCOUNTER
Medication refill information reviewed.     Due date for buprenorphine (SUBUTEX) 8 MG SUBL sublingual tablet and oxycodone 5 MG tablet is 11/02/23     Prescriptions prepped for review.     Will route to provider.

## 2023-11-02 NOTE — TELEPHONE ENCOUNTER
Received refill request for:    buprenorphine (SUBUTEX) 8 MG SUBL sublingual tablet   Last dispensed from pharmacy on 9/26/23    oxycodone 5 MG tablet     Last dispensed from pharmacy on   7/6/23 #42  7/20/23 #6    Patient's last office/virtual visit by prescribing provider on 8/9/23  Next office/virtual appointment scheduled for 11/15/23    Last urine drug screen date 5/17/23  Current opioid agreement on file? Yes Date of opioid agreement: 5/17/23    E-prescribe to:     [id number] - [place], [place] - [address] AT [address]    Will route to nursing Gila for review and preparation of prescription(s).

## 2023-11-02 NOTE — TELEPHONE ENCOUNTER
Please process a refill of buprenorphine (SUBUTEX) 8 MG SUBL sublingual tablet  and  oxycodone 5 MG tablet to     [id number] - [place], [place] - [address] [place] [address]  Phone: [phone] Fax: [phone]

## 2023-11-03 RX ORDER — OXYCODONE HYDROCHLORIDE 5 MG/1
5 TABLET ORAL EVERY 6 HOURS PRN
Qty: 42 TABLET | Refills: 0 | Status: SHIPPED | OUTPATIENT
Start: 2023-11-03 | End: 2023-11-15

## 2023-11-03 RX ORDER — BUPRENORPHINE 8 MG/1
8 TABLET SUBLINGUAL 3 TIMES DAILY
Qty: 90 TABLET | Refills: 0 | Status: SHIPPED | OUTPATIENT
Start: 2023-11-03 | End: 2023-11-15

## 2023-11-03 NOTE — TELEPHONE ENCOUNTER
Signed Prescriptions:                        Disp   Refills    buprenorphine (SUBUTEX) 8 MG SUBL sublingu*90 tab*0        Sig: Place 1 tablet (8 mg) under the tongue 3 times daily           Fill now. Start November 2, 2023. 30 day script.  Authorizing Provider: SUSANA PETTY    oxyCODONE (ROXICODONE) 5 MG tablet         42 tab*0        Sig: Take 1 tablet (5 mg) by mouth every 6 hours as needed           for severe pain Max of 3/day. Fill/begin November 2, 2023 Short term script for acute pain flare.  Authorizing Provider: SUSANA PETTY        Reviewed [place]  November 3, 2023- no concerning fills.    Susana Petty APRN, RN CNP, FNP  [facility]  [facility] [place]

## 2023-11-05 DIAGNOSIS — F32.A DEPRESSION, UNSPECIFIED DEPRESSION TYPE: ICD-10-CM

## 2023-11-06 ENCOUNTER — VIRTUAL VISIT (OUTPATIENT)
Dept: PSYCHOLOGY | Facility: CLINIC | Age: 58
End: 2023-11-06
Payer: COMMERCIAL

## 2023-11-06 DIAGNOSIS — F41.1 GENERALIZED ANXIETY DISORDER: ICD-10-CM

## 2023-11-06 DIAGNOSIS — F41.0 PANIC DISORDER WITHOUT AGORAPHOBIA: ICD-10-CM

## 2023-11-06 DIAGNOSIS — F33.1 MAJOR DEPRESSIVE DISORDER, RECURRENT EPISODE, MODERATE (H): Primary | ICD-10-CM

## 2023-11-06 PROCEDURE — 90834 PSYTX W PT 45 MINUTES: CPT | Mod: VID | Performed by: PSYCHOLOGIST
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
## 2023-11-08 RX ORDER — FOLIC ACID 1 MG/1
3000 TABLET ORAL DAILY
Qty: 300 TABLET | Refills: 2 | Status: SHIPPED | OUTPATIENT
Start: 2023-11-08 | End: 2023-12-01

## 2023-11-15 ENCOUNTER — VIRTUAL VISIT (OUTPATIENT)
Dept: PALLIATIVE MEDICINE | Facility: CLINIC | Age: 58
End: 2023-11-15
Attending: NURSE PRACTITIONER
Payer: COMMERCIAL

## 2023-11-15 DIAGNOSIS — M25.50 MULTIPLE JOINT PAIN: ICD-10-CM

## 2023-11-15 DIAGNOSIS — F11.20 UNCOMPLICATED OPIOID DEPENDENCE (H): ICD-10-CM

## 2023-11-15 DIAGNOSIS — M05.79 RHEUMATOID ARTHRITIS INVOLVING MULTIPLE SITES WITH POSITIVE RHEUMATOID FACTOR (H): ICD-10-CM

## 2023-11-15 DIAGNOSIS — M45.7 ANKYLOSING SPONDYLITIS OF LUMBOSACRAL REGION (H): ICD-10-CM

## 2023-11-15 DIAGNOSIS — M79.18 MYOFASCIAL PAIN: ICD-10-CM

## 2023-11-15 DIAGNOSIS — M47.817 SPONDYLOSIS WITHOUT MYELOPATHY OR RADICULOPATHY, LUMBOSACRAL REGION: ICD-10-CM

## 2023-11-15 DIAGNOSIS — M54.59 LUMBAR FACET JOINT PAIN: ICD-10-CM

## 2023-11-15 DIAGNOSIS — M51.369 DDD (DEGENERATIVE DISC DISEASE), LUMBAR: ICD-10-CM

## 2023-11-15 DIAGNOSIS — M62.838 MUSCLE SPASM: ICD-10-CM

## 2023-11-15 DIAGNOSIS — F11.90 CHRONIC, CONTINUOUS USE OF OPIOIDS: ICD-10-CM

## 2023-11-15 PROCEDURE — 99213 OFFICE O/P EST LOW 20 MIN: CPT | Mod: VID | Performed by: NURSE PRACTITIONER

## 2023-11-15 RX ORDER — OXYCODONE HYDROCHLORIDE 5 MG/1
5 TABLET ORAL EVERY 6 HOURS PRN
Qty: 42 TABLET | Refills: 0 | Status: SHIPPED | OUTPATIENT
Start: 2023-11-15 | End: 2024-01-29

## 2023-11-15 RX ORDER — BUPRENORPHINE 8 MG/1
8 TABLET SUBLINGUAL 3 TIMES DAILY
Qty: 90 TABLET | Refills: 0 | Status: SHIPPED | OUTPATIENT
Start: 2023-11-15 | End: 2024-01-18

## 2023-11-15 ASSESSMENT — PAIN SCALES - GENERAL: PAINLEVEL: EXTREME PAIN (9)
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
## 2023-11-15 NOTE — PATIENT INSTRUCTIONS
Plan:    Check out [facility] online for gentle exercise  Physical Therapy:  Referral to PT  Clinical Health Psychologist: continue work with your psychiatrist and therapist  Diagnostic Studies:  None  Medication Management:    continue Subutex 8mg  three times per day starting today. Next script to be filled on 11/30 and start 12/2  Continue medical cannabis, re-cert today  continue tizanidine 2-4mg three times daily as needed for muscle spasms  Short term oxycodone script max of 3/day, fill and begin 11/15/2023. 7 day supply for acute on chronic pain in the setting of also having COVID  Further procedures recommended: none  Recommendations to PCP: see above   Follow up:12 weeks in-person/video. Please call [phone] to make your follow-up appointment with me.     ----------------------------------------------------------------  Clinic Number:  [phone]   Call with any questions about your care and for scheduling assistance.   Calls are returned Monday through Friday between 8 AM and 4:30 PM. We usually get back to you within 2 business days depending on the issue/request.    If we are prescribing your medications:  For opioid medication refills, call the clinic or send a 1-4 All message 7 days in advance.  Please include:  Name of requested medication  Name of the pharmacy.  For non-opioid medications, call your pharmacy directly to request a refill. Please allow 3-4 days to be processed.   Per [place] State Law:  All controlled substance prescriptions must be filled within 30 days of being written.    For those controlled substances allowing refills, pickup must occur within 30 days of last fill.      We believe regular attendance is key to your success in our program!    Any time you are unable to keep your appointment we ask that you call us at least 24 hours in advance to cancel.This will allow us to offer the appointment time to another patient.   Multiple missed appointments may lead to dismissal from  the clinic.

## 2023-11-20 ENCOUNTER — VIRTUAL VISIT (OUTPATIENT)
Dept: PSYCHOLOGY | Facility: CLINIC | Age: 58
End: 2023-11-20
Payer: COMMERCIAL

## 2023-11-20 DIAGNOSIS — F33.1 MAJOR DEPRESSIVE DISORDER, RECURRENT EPISODE, MODERATE (H): Primary | ICD-10-CM

## 2023-11-20 DIAGNOSIS — F41.1 GENERALIZED ANXIETY DISORDER: ICD-10-CM

## 2023-11-20 DIAGNOSIS — F41.0 PANIC DISORDER WITHOUT AGORAPHOBIA: ICD-10-CM

## 2023-11-20 PROCEDURE — 90834 PSYTX W PT 45 MINUTES: CPT | Mod: VID | Performed by: PSYCHOLOGIST

## 2023-11-20 ASSESSMENT — COLUMBIA-SUICIDE SEVERITY RATING SCALE - C-SSRS
REASONS FOR IDEATION PAST MONTH: DOES NOT APPLY
TOTAL  NUMBER OF ACTUAL ATTEMPTS LIFETIME: 1
2. HAVE YOU ACTUALLY HAD ANY THOUGHTS OF KILLING YOURSELF?: NO
1. IN THE PAST MONTH, HAVE YOU WISHED YOU WERE DEAD OR WISHED YOU COULD GO TO SLEEP AND NOT WAKE UP?: NO
LETHALITY/MEDICAL DAMAGE CODE FIRST POTENTIAL ATTEMPT: BEHAVIOR LIKELY TO RESULT IN DEATH DESPITE AVAILABLE MEDICAL CARE
4. HAVE YOU HAD THESE THOUGHTS AND HAD SOME INTENTION OF ACTING ON THEM?: NO
1. HAVE YOU WISHED YOU WERE DEAD OR WISHED YOU COULD GO TO SLEEP AND NOT WAKE UP?: YES
LETHALITY/MEDICAL DAMAGE CODE MOST LETHAL POTENTIAL ATTEMPT: BEHAVIOR LIKELY TO RESULT IN DEATH DESPITE AVAILABLE MEDICAL CARE
LETHALITY/MEDICAL DAMAGE CODE MOST LETHAL ACTUAL ATTEMPT: NO PHYSICAL DAMAGE OR VERY MINOR PHYSICAL DAMAGE
LETHALITY/MEDICAL DAMAGE CODE MOST RECENT ACTUAL ATTEMPT: NO PHYSICAL DAMAGE OR VERY MINOR PHYSICAL DAMAGE
TOTAL  NUMBER OF ABORTED OR SELF INTERRUPTED ATTEMPTS LIFETIME: NO
FIRST ATTEMPT DATE: 61643
LETHALITY/MEDICAL DAMAGE CODE MOST RECENT POTENTIAL ATTEMPT: BEHAVIOR LIKELY TO RESULT IN DEATH DESPITE AVAILABLE MEDICAL CARE
LETHALITY/MEDICAL DAMAGE CODE FIRST ACTUAL ATTEMPT: NO PHYSICAL DAMAGE OR VERY MINOR PHYSICAL DAMAGE
5. HAVE YOU STARTED TO WORK OUT OR WORKED OUT THE DETAILS OF HOW TO KILL YOURSELF? DO YOU INTEND TO CARRY OUT THIS PLAN?: YES
ATTEMPT LIFETIME: YES
ATTEMPT PAST THREE MONTHS: NO
4. HAVE YOU HAD THESE THOUGHTS AND HAD SOME INTENTION OF ACTING ON THEM?: YES
5. HAVE YOU STARTED TO WORK OUT OR WORKED OUT THE DETAILS OF HOW TO KILL YOURSELF? DO YOU INTEND TO CARRY OUT THIS PLAN?: NO
3. HAVE YOU BEEN THINKING ABOUT HOW YOU MIGHT KILL YOURSELF?: NO
REASONS FOR IDEATION LIFETIME: COMPLETELY TO END OR STOP THE PAIN (YOU COULDN'T GO ON LIVING WITH THE PAIN OR HOW YOU WERE FEELING)
TOTAL  NUMBER OF INTERRUPTED ATTEMPTS LIFETIME: NO
MOST LETHAL DATE: 61643
2. HAVE YOU ACTUALLY HAD ANY THOUGHTS OF KILLING YOURSELF?: YES
6. HAVE YOU EVER DONE ANYTHING, STARTED TO DO ANYTHING, OR PREPARED TO DO ANYTHING TO END YOUR LIFE?: NO
MOST RECENT DATE: 61643

## 2023-11-20 ASSESSMENT — PATIENT HEALTH QUESTIONNAIRE - PHQ9: 5. POOR APPETITE OR OVEREATING: MORE THAN HALF THE DAYS

## 2023-11-20 ASSESSMENT — ANXIETY QUESTIONNAIRES
6. BECOMING EASILY ANNOYED OR IRRITABLE: NOT AT ALL
7. FEELING AFRAID AS IF SOMETHING AWFUL MIGHT HAPPEN: NOT AT ALL
2. NOT BEING ABLE TO STOP OR CONTROL WORRYING: NOT AT ALL
GAD7 TOTAL SCORE: 3
1. FEELING NERVOUS, ANXIOUS, OR ON EDGE: NOT AT ALL
GAD7 TOTAL SCORE: 3
IF YOU CHECKED OFF ANY PROBLEMS ON THIS QUESTIONNAIRE, HOW DIFFICULT HAVE THESE PROBLEMS MADE IT FOR YOU TO DO YOUR WORK, TAKE CARE OF THINGS AT HOME, OR GET ALONG WITH OTHER PEOPLE: NOT DIFFICULT AT ALL
3. WORRYING TOO MUCH ABOUT DIFFERENT THINGS: NOT AT ALL
5. BEING SO RESTLESS THAT IT IS HARD TO SIT STILL: SEVERAL DAYS

## 2023-11-20 NOTE — PROGRESS NOTES
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 at intake  5. Provisional Diagnosis: No provisional  6. Prognosis: Relieve Acute Symptoms.  7. Likely consequences of symptoms if not treated:  Continued and increased symptoms..  8. Client strengths include:  caring, empathetic, goal-focused, has a previous history of therapy, intelligent, motivated, open to learning, open to suggestions / feedback and support of family, friends and providers .     Recommendations:     1. Plan for Safety and Risk Management:   Safety and Risk: Recommended that patient call 911 or go to the local ED should there be a change in any of these risk factors..         Report to child / adult protection services was  No issues or concerns reported .     2. Patient's identified  Family conflicts/estranged form his mother who is in a care center. at age 74. Patient attempted to reconnect while mother in hospital with a health emergency and mother refused to see him.   .     3. Initial Treatment will focus on:    Depressed Mood - Identify triggers and strengthen and build coping skills.  .     4. Resources/Service Plan:    services are not indicated.   Modifications to assist communication are not indicated.   Additional disability accommodations are not indicated.      5. Collaboration:   Collaboration / coordination of treatment will be initiated with the following  support professionals: primary care physician, outpatient therapist and psychiatry.      6.  Referrals:   The following referral(s) will be initiated:  [place] follow-up with [facility] therapist on 9/18/2023. . Next Scheduled Appointment: 7/12/2023.      A Release of Information has been obtained for the following: No POI needs identified at this intake.      Emergency Contact  was obtained.        Clinical Substantiation/medical necessity for the above recommendations: Patient mental health history and is current symptoms indicate he will benefit from ongoing psychotherapy to process past trauma , family conflicts and health issues.      7. FRIEDA:    FRIEDA:  Discussed the general effects of drugs and alcohol on health and well-being. Provider gave patient printed  information about the effects of chemical use on their health and well being. Recommendations:  Continue to avoid alcohol use.  Discuss prescription drug use with PCP and psychiatry.      8. Records:   These were reviewed at time of assessment.   Information in this assessment was obtained from the medical record and provided by patient who is a good historian.     Patient will have open access to their mental health medical record.    9.   Interactive Complexity: No      Provider Name/ Credentials:  [facility]   June 21, 2023

## 2023-11-21 ENCOUNTER — VIRTUAL VISIT (OUTPATIENT)
Dept: PSYCHIATRY | Facility: CLINIC | Age: 58
End: 2023-11-21
Attending: PSYCHIATRY & NEUROLOGY
Payer: COMMERCIAL

## 2023-11-21 DIAGNOSIS — F33.1 MAJOR DEPRESSIVE DISORDER, RECURRENT EPISODE, MODERATE (H): Primary | ICD-10-CM

## 2023-11-21 DIAGNOSIS — F43.10 PTSD (POST-TRAUMATIC STRESS DISORDER): ICD-10-CM

## 2023-11-21 DIAGNOSIS — F41.1 GENERALIZED ANXIETY DISORDER WITH PANIC ATTACKS: ICD-10-CM

## 2023-11-21 DIAGNOSIS — F41.0 GENERALIZED ANXIETY DISORDER WITH PANIC ATTACKS: ICD-10-CM

## 2023-11-21 PROCEDURE — 99214 OFFICE O/P EST MOD 30 MIN: CPT | Mod: VID

## 2023-11-21 PROCEDURE — 90833 PSYTX W PT W E/M 30 MIN: CPT | Mod: VID

## 2023-11-21 ASSESSMENT — PATIENT HEALTH QUESTIONNAIRE - PHQ9
10. IF YOU CHECKED OFF ANY PROBLEMS, HOW DIFFICULT HAVE THESE PROBLEMS MADE IT FOR YOU TO DO YOUR WORK, TAKE CARE OF THINGS AT HOME, OR GET ALONG WITH OTHER PEOPLE: SOMEWHAT DIFFICULT
SUM OF ALL RESPONSES TO PHQ QUESTIONS 1-9: 12
SUM OF ALL RESPONSES TO PHQ QUESTIONS 1-9: 12

## 2023-11-21 ASSESSMENT — PAIN SCALES - GENERAL: PAINLEVEL: EXTREME PAIN (8)

## 2023-11-21 NOTE — NURSING NOTE
Is the patient currently in the state of [place]? YES    Visit mode:VIDEO    If the visit is dropped, the patient can be reconnected by: VIDEO VISIT: Send to e-mail at: [email or web address]    Will anyone else be joining the visit? NO  (If patient encounters technical issues they should call [phone]    How would you like to obtain your AVS? MyChart    Are changes needed to the allergy or medication list? No    Reason for visit: BERTO LIU

## 2023-11-21 NOTE — PROGRESS NOTES
[facility]  MEDICAL PROGRESS NOTE     CARE TEAM:    PCP- Aiden Resendez  Therapist-  Haim Quezada PsyD   Rheum- MD Karma and Pain-JUANITA Pike      Jamison is a 58 year old who uses the pronouns him, his.        Diagnoses     Major depressive disorder, recurrent, moderate  Generalized anxiety disorder, with panic  PTSD     Medical Diagnoses:  Chronic pain  Rheumatoid arthritis  Ankylosing spondylitis (HLA-B27 positive)  Lumbar degenerative disc disease  Hypertension  Hyperlipidemia     Assessment     Jamison is a 58 year old  man who is seen today for follow up for  MDD, JEFF, and PTSD in the context of significant medical co-morbidities including rheumatoid arthritis and chronic pain. Significant symptoms have included depressed mood, anhedonia, poor sleep, and avolition. Jamison is currently taking medical cannabis which might play a role in mood changes.      Today, Jamison reports some improvement in anhedonia and motivation following the increase in quetiapine but notes that he has been sick with COVID-19 infection in the past week. COVID-19 symptoms are improving. He does not appear to be at imminent risk of harm to himself or others. He has not yet started behavioral activation. He is hoping to start once COVID-19 symptoms resolve. Agreed to keep medications unchanged given that there was some improvement prior to the COVID infection. He has not yet started using the light box but plans to start using it soon as this has helped him in the past.        Future considerations:  -Levomilnacipran  -TCAs  -Follow up on memory concerns- cognitive screen     Psychotropic Drug Interactions:  [PSYCHCLINICDDI]  ADDITIVE SEROTONERGIC: fluoxetine, Suboxone  ADDITIVE QTc: fluoxetine, quetiapine, Suboxone  ADDITIVE ORTHOSTASIS: prazosin, buprenorphine  ADDITIVE CNS/RESPIRATORY DEPRESSION: Suboxone, quetiapine, oxycodone  Management:  routine monitoring    [place]: indicates that controlled prescriptions have been filled as prescribed    Risk Statements:   Treatment Risk- Risks, benefits, alternatives and potential adverse effects have been discussed and are understood.   Safety Risk-Les did not appear to be an imminent safety risk to self or others.     Plan     1) Medications:   - Continue quetiapine (Seroquel) 150 mg at bedtime    - Continue fluoxetine (PROZAC) 80 mg daily  - Continue prazosin (MINIPRESS) 1 mg at bedtime   - Continue melatonin 3 mg 3-4 hours before desired bedtime     Light box use      Other:  - Suboxone  - Methotrexate  - Oxycodone (per pain clinic for pain flare short term)  - Medical cannabis (per pain clinic)     2) Therapy- Recommend- Planning to start behavioral activation.      3) Next Due   Labs - AP labs last ordered on 6/15/2023 but not completed - encouraged patient to complete these labs prior to the next visit  EKG - last EKG 12/8/2022.  Rating scales- AIMS, will complete during next in person visit     4) Referral - Previously behavioral activation        - Previously SW for resources (55+ group? Financial resources? Transportation resources? )      5) Other: Previously care coordination with BA team        Previously sleep hygiene     6) RTC: 4 weeks       Pertinent Background                                                   [most recent eval 07/11/23]     Copied from prior notes and reviewed with patient    Originally, diagnosed with depression and anxiety in 2015 but first experienced symptoms of depression in middle school. He most recently had a significant worsening of symptoms in 2017 after he was diagnosed with rheumatoid arthritis, ankylosing spondylitis ended up with a knee injury and was no longer able to work or run which he used as his primary coping mechanism.     Pertinent Items Include: suicidal ideation, SIB [cutting], multiple psychotropic trials , severe med reaction, psych  hosp (<3), SUBSTANCE USE: alcohol, substance use treatment  and Major Medical Problems (Rheumatoid Arthritis and Ankylosing Spondylitis)     Subjective   Jamison was last seen on 10/24/2023 during which quetiapine dose was increased and BA psychotherapy referral was placed.   Since last visit, Jamison notes    He has not yet started BA. They called yesterday to make an appointment.  Has been sick with COVID this last week- trying to recover from it- sore throat, cold, hot flashes- getting better    MH has been okay- no time to think about anything else with poor physical health- looking forward to getting better and getting out of the house    Prior to COVID infection tried to get out daily and walk the dog, bake some some stuff- was fun    Sleep- on/off- attributes this to being sick, it was okay prior to falling sick with COVID- quetiapine probably helped him fall asleep quicker    Mood- did some more fun things in the kitchen after increase in quetiapine dose    SI- denies - wants to feel better again     Has not used light box yet- got him going quicker in the morning in the past- hoping to start using again    Holidays- tough b/c of situation with mother- has cut him out of her life-mom gave up on life after dad and sister passed- would be ready to drop everything to go help her but mom is not open to admitting she was wrong    Guilt about strained relationship with mom  Guilt about being mean to her sister about a week prior to her being killed at work 15 years ago    PTSD - not as bad since med changes- has been doing better at taking quetiapine and prazosin at night- has fallen asleep a few times and forgotten to take them- discussed setting an alarm     Pain- still present- stopped taking RA meds because of COVID- pain worsened after he stopped        Recent Psych Symptoms:   Elevated:  none  Psychosis:  none    Current Social History:  Financial/occupational: On disability. Was planning to complete studies at [employer]  [employer] for a degree in psychology- has a couple of credits left to complete degree; unable to work due to diagnosis of rheumatoid arthritis and ankylosing spondylitis   Living situation (partner, children, pets, etc): [place], [place] with wife and daughter 25 yo at home, son is in [place], two dogs - lost one  Social/spiritual support: Talks to  every two weeks, not many friends, family is supportive, likes walking and hiking  Feels safe at home: Yes    Pertinent Substance Use:   Alcohol: No   Cannabis: Yes: On medical cannabis- has not used it for a while because can't afford it   Tobacco: No  Caffeine:  No   Opioids: No   Narcan Kit current: Yes  Other substances: none    Medical Review of Systems:   Lightheadedness/orthostasis: None  Headaches: less, happens once in a while   GI: None     Mental Status Exam     Alertness: alert  and oriented  Appearance: casually groomed  Behavior/Demeanor: cooperative, pleasant and calm, with poor eye contact   Speech: normal and regular rate and rhythm  Language: no obvious problem  Psychomotor: normal or unremarkable  Mood:   \"okay\"  Affect: restricted and appropriate; congruent to: mood- yes  Thought Content:  Reports none;  Denies suicidal ideation, violent ideation and delusions   Perception:  Reports none;  Denies hallucinations  Insight: adequate  Judgment: adequate for safety  Cognition: does  appear grossly intact; formal cognitive testing was not done  Gait and Station: N/A (telehealth)     Past Psych Med Trials     Copied from prior note and reviewed with patient    Medication  Dose   (mg) Effect  Dates of Use   Fluoxetine 40-80 Felt like was initially helpful 2016 - 2017 2022-2023   Sertraline 100 Effective initially, eventually self discontinued as not helpful 2018 - 7/2020   Duloxetine 30 BID Used to treat nerve pain; felt weird on it 2017   Bupropion  BID ineffective 2017 - 2019   Nortriptyline 50 For pain 2017 -  3/2019   Mirtazapine   " "Dissociated for entire first week after taking  Per MTM on 5/15/23: \"\"some kind of psychosis\". States that he was also taking oxycodone, alprazolam, fentanyl patches at the time\" 2012   Trazodone 50   2013             Hydroxyzine 25 QID prn For anxiety and panic attacks; not effective for severe anxiety 2015 - 2017   Buspirone 15 TID   2016 - 2017   Gabapentin 100 TID   2013 - 2016   Alprazolam 0.5 TID For anxiety 7426-9576   Diazepam 2 BID For anxiety 2016   Lorazepam 1 TID For anxiety 2016   Quetiapine  25-50   6543-0359   Clonazepam 1 For anxiety 2016         Treatment Course and [place] Events Since   July 2023     -07/2023- Transfer of care-no med changes  -08/2023- Taper down and discontinue liothyronine  -10/2023- More depressed. Increased quetiapine from 25 mg at bedtime to 150 mg at bedtime; started melatonin       Vitals     There were no vitals taken for this visit.  Pulse Readings from Last 3 Encounters:   08/09/23 72   07/20/23 85   05/17/23 69     Wt Readings from Last 3 Encounters:   12/08/22 131.5 kg (290 lb)   05/02/22 122.7 kg (270 lb 8 oz)   03/16/22 125.3 kg (276 lb 3.2 oz)     BP Readings from Last 3 Encounters:   08/09/23 125/79   07/20/23 (!) 146/91   05/17/23 (!) 149/92         Medical History     ALLERGIES: Mirtazapine, Hydrocodone, Mirtazapine, and Ms contin [morphine]    Patient Active Problem List   Diagnosis    CARDIOVASCULAR SCREENING; LDL GOAL LESS THAN 160    Obesity, Class I, BMI 30-34.9    Tear meniscus knee, right, initial encounter    Rheumatoid arthritis involving multiple sites, unspecified rheumatoid factor presence    Chronic pain    Right-sided thoracic back pain, unspecified chronicity    Generalized anxiety disorder    Panic attack    Ankylosing spondylitis (H)    Moderate major depression (H)    Immunocompromised (H24)    Essential hypertension with goal blood pressure less than 140/90    Suicidal ideation    Insomnia due to other mental disorder    Major depressive disorder, " recurrent episode, severe with mixed features (H)    Chronic back pain greater than 3 months duration    Contact dermatitis and eczema    Dermatitis    Drug dependence (H)    Encounter for screening colonoscopy    Esophageal reflux    Hematuria    Hyperlipidemia    Lumbar radiculopathy    Plantar fascial fibromatosis    Sprain of sacroiliac region    Overweight    Nonintractable migraine    Narcotic abuse, continuous (H)    Morbid obesity (H)        Medications     Current Outpatient Medications   Medication Sig Dispense Refill    atorvastatin (LIPITOR) 20 MG tablet TAKE 1 TABLET BY MOUTH DAILY 90 tablet 0    buprenorphine (SUBUTEX) 8 MG SUBL sublingual tablet Place 1 tablet (8 mg) under the tongue 3 times daily Fill 11/30. Start December 2, 2023. 30 day script. 90 tablet 0    FLUoxetine (PROZAC) 40 MG capsule Take 2 capsules (80 mg) by mouth daily 180 capsule 0    folic acid (FOLVITE) 1 MG tablet TAKE 3 TABLETS BY MOUTH DAILY 300 tablet 2    medical cannabis (Patient's own supply) See Admin Instructions (The purpose of this order is to document that the patient reports taking medical cannabis.  This is not a prescription, and is not used to certify that the patient has a qualifying medical condition.)     Pills PRN   Lozenges PRN      melatonin 3 MG tablet Take 1 tablet (3 mg) by mouth every evening . Take 3-4 hours before your desired bedtime. 90 tablet 0    methotrexate 2.5 MG tablet Take 8 tablets (20 mg) by mouth every 7 days 96 tablet 0    naloxone (NARCAN) 4 MG/0.1ML nasal spray Spray 1 spray (4 mg) into one nostril alternating nostrils as needed for opioid reversal every 2-3 minutes until assistance arrives 0.2 mL 1    oxyCODONE (ROXICODONE) 5 MG tablet Take 1 tablet (5 mg) by mouth every 6 hours as needed for severe pain Max of 3/day. Fill/begin November 15, 2023 Short term script for acute pain flare and COVID infection, off of his anti-rheum meds 42 tablet 0    prazosin (MINIPRESS) 1 MG capsule Take 1  capsule (1 mg) by mouth at bedtime 90 capsule 0    QUEtiapine (SEROQUEL) 50 MG tablet Take 1 tablet (50 mg) by mouth at bedtime for 7 days, THEN 2 tablets (100 mg) at bedtime for 7 days, THEN 3 tablets (150 mg) at bedtime for 76 days. 249 tablet 0    tiZANidine (ZANAFLEX) 4 MG tablet Take 1 tablet (4 mg) by mouth 3 times daily as needed for muscle spasms 75 tablet 2    tiZANidine (ZANAFLEX) 4 MG tablet Take 1 tablet (4 mg) by mouth 3 times daily as needed for muscle spasms 45 tablet 1    tofacitinib (XELJANZ XR) 11 MG 24 hr tablet Take 1 tablet (11 mg) by mouth daily Hold for signs of infection, then seek medical attention. (Patient not taking: Reported on 8/18/2023) 30 tablet 5        Labs and Data         7/12/2023     5:00 PM 9/18/2023    10:31 AM 10/24/2023     9:47 AM   PROMIS-10 Total Score w/o Sub Scores   PROMIS TOTAL - SUBSCORES 23 14 15         6/12/2023     8:05 PM 7/12/2023     5:00 PM 11/20/2023     3:06 PM   CAGE-AID Total Score   Total Score 0 0 4   Total Score MyChart 0 (A total score of 2 or greater is considered clinically significant)           9/18/2023    10:29 AM 10/24/2023     9:45 AM 11/21/2023     9:36 AM   PHQ-9 SCORE   PHQ-9 Total Score MyChart 18 (Moderately severe depression) 15 (Moderately severe depression) 12 (Moderate depression)   PHQ-9 Total Score 18 15 12         6/12/2023     8:06 PM 7/12/2023     5:00 PM 11/20/2023     3:06 PM   JEFF-7 SCORE   Total Score 8 (mild anxiety)     Total Score 8 5 3     Recent Labs   Lab Test 12/08/22  1427 12/08/22  1425 10/11/21  1123   GLC  --  96 97   A1C 5.5  --  5.2     Recent Labs   Lab Test 12/08/22  1425 10/11/21  1123   CHOL 224* 209*   TRIG 230* 225*   * 134*   HDL 25* 30*     Recent Labs   Lab Test 05/17/23  1007 12/08/22  1425 08/31/22  1405 10/11/21  1123   AST 30 32   < > 37   ALT 48 49   < > 47   ALKPHOS  --  112  --  102    < > = values in this interval not displayed.     Recent Labs   Lab Test 05/17/23  1007 12/08/22  1423  08/31/22  1405 10/11/21  1123   WBC 6.4 7.7 6.0 7.0   ANEUTAUTO  --  5.3 3.2 4.4   HGB 15.5 15.6 15.1 16.2    225 213 232          PROVIDER: Zain Hernández MD    Level of Medical Decision Making:   - At least 1 chronic problem that is not stable  - Engaged in prescription drug management during visit (discussed any medication benefits, side effects, alternatives, etc.)           Psychiatry Individual Psychotherapy Note   Psychotherapy start time - 9:54 AM  Psychotherapy end time - 10:10 AM  Date last reviewed with patient - 11/21/23  Subjective: This supportive psychotherapy session addressed issues related to goals of therapy and current psychosocial stressors. Patient's reaction: Action in the context of mental status appropriate for ambulatory setting.  Progress: action  Interactive complexity indicated? No  Plan: RTC in timeframe noted above  Psychotherapy services during this visit included myself and the patient.   Treatment Plan      SYMPTOMS; PROBLEMS   MEASURABLE GOALS;    FUNCTIONAL IMPROVEMENT / GAINS INTERVENTIONS DISCHARGE CRITERIA   Depression: insomnia and avolition   Choose at least one enjoyable activity and start behavioral activation and improve sleep hygiene Supportive / psychodynamic marked symptom improvement         Patient staffed in clinic with Dr. Kirby who will sign the note.  Supervisor is Dr. Hunter.

## 2023-11-21 NOTE — PATIENT INSTRUCTIONS
It was nice seeing you today     Treatment Plan Today:      1) Medications- No changes     2) Start behavioral activation therapy    3) Start using the light box    4) Complete ordered labs when you can    5) Please return to clinic in 4 weeks      6) Crisis numbers are below and clinic after hours number is [phone]      **For crisis resources, please see the information at the end of this document**   Patient Education    Thank you for coming to the [facility] [place] [facility].     Lab Testing:  If you had lab testing today and your results are reassuring or normal they will be mailed to you or sent through [employer] within 7 days. If the lab tests need quick action we will call you with the results. The phone number we will call with results is # [phone]. If this is not the best number please call our clinic and change the number.     Medication Refills:  If you need any refills please call your pharmacy and they will contact us. Our fax number for refills is [phone].   Three business days of notice are needed for general medication refill requests.   Five business days of notice are needed for controlled substance refill requests.   If you need to change to a different pharmacy, please contact the new pharmacy directly. The new pharmacy will help you get your medications transferred.     Contact Us:  Please call [phone] during business hours (8-5:00 M-F).   If you have medication related questions after clinic hours, or on the weekend, please call [phone].     Financial Assistance [phone]   Medical Records [phone]       MENTAL HEALTH CRISIS RESOURCES:  For a emergency help, please call 911 or go to the nearest Emergency Department.     Emergency Walk-In Options:   EmPATH Unit @ [facility] ([place]): [phone] - Specialized mental health emergency area designed to be calming  [facility] ([place]):  [phone]  [facility] ([place]): [phone]  [facility] ([place]): [phone]    [facility] Crisis Information:   [place]: [phone]  [place]: [phone]  [place] ([place]) - Adult: [phone]     Child: [phone]  [place] - Adult: [phone]     Child: [phone]  [place]: [phone]  List of all [facility] resources:   https://[place].gov/dhs/people-we-serve/adults/health-care/mental-health/resources/crisis-contacts.jsp    National Crisis Information:   Crisis Text Line: Text  [place]  to 163049  Suicide & Crisis Lifeline: 988  National Suicide Prevention Lifeline: 0-849-402-TALK (1-163.496.8518)       For online chat options, visit https://suicidepreventionlifeline.org/chat/  Poison Control Center: 1-935.456.6165  Trans Lifeline: 4-615-905-5937 - Hotline for transgender people of all ages  The Tu Project: 9-226-403-2542 - Hotline for LGBT youth     For Non-Emergency Support:   Fast Tracker: Mental Health & Substance Use Disorder Resources -   https://www.ObjectL[employer]n.org/

## 2023-11-24 NOTE — TELEPHONE ENCOUNTER
3rd attempt to contact patient regarding upcoming EGD with bravo procedure on 5.27.2021 for pre assessment questions.  No answer.  Left message to return call to [phone] #2    COVID test 5.23.2021    Venecia Arnold RN  
Attempted to contact patient regarding upcoming EGD with bravo procedure on 5.27.2021 for pre assessment questions.  No answer.  Left message to return call to [phone] #2    COVID test 5.23.2021  Pt to talk with PCP re: suboxone dosing prior to procedure.    Venecia Arnold RN    
Second attempt    Attempted to contact patient regarding upcoming EGD BRAVO procedure on 5/27/21 for pre assessment questions. No answer.     Left message to return call to [phone] #2    Covid test scheduled 5/23/21    Patient is on suboxone. Patient to consult with provider regarding medication.    Will send mychart message regarding returning call.    Renetta Strauss RN      
Writer reviewed pre-assessment questions with patient prior to upcoming EGD with bravo on 6.3.2021.  COVID test scheduled for 6.2.2021.    Pre-op on 6.2.2021 at [facility] clinic.    Reviewed EGD with bravo prep instructions with patient.      Designated  policy reviewed with patient.     Patient verbalized understanding.  No further questions or concerns.    Venecia Arnold RN        
No

## 2023-11-30 ENCOUNTER — VIRTUAL VISIT (OUTPATIENT)
Dept: PSYCHIATRY | Facility: CLINIC | Age: 58
End: 2023-11-30
Payer: COMMERCIAL

## 2023-11-30 DIAGNOSIS — F41.1 GENERALIZED ANXIETY DISORDER WITH PANIC ATTACKS: ICD-10-CM

## 2023-11-30 DIAGNOSIS — F33.1 MAJOR DEPRESSIVE DISORDER, RECURRENT EPISODE, MODERATE (H): Primary | ICD-10-CM

## 2023-11-30 DIAGNOSIS — F43.10 PTSD (POST-TRAUMATIC STRESS DISORDER): ICD-10-CM

## 2023-11-30 DIAGNOSIS — F41.0 GENERALIZED ANXIETY DISORDER WITH PANIC ATTACKS: ICD-10-CM

## 2023-11-30 ASSESSMENT — PATIENT HEALTH QUESTIONNAIRE - PHQ9: SUM OF ALL RESPONSES TO PHQ QUESTIONS 1-9: 15
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
## 2023-12-01 ENCOUNTER — VIRTUAL VISIT (OUTPATIENT)
Dept: RHEUMATOLOGY | Facility: CLINIC | Age: 58
End: 2023-12-01
Payer: COMMERCIAL

## 2023-12-01 DIAGNOSIS — F32.A DEPRESSION, UNSPECIFIED DEPRESSION TYPE: ICD-10-CM

## 2023-12-01 DIAGNOSIS — M06.9 RHEUMATOID ARTHRITIS INVOLVING MULTIPLE SITES, UNSPECIFIED WHETHER RHEUMATOID FACTOR PRESENT (H): ICD-10-CM

## 2023-12-01 PROCEDURE — 99214 OFFICE O/P EST MOD 30 MIN: CPT | Mod: VID | Performed by: STUDENT IN AN ORGANIZED HEALTH CARE EDUCATION/TRAINING PROGRAM

## 2023-12-01 RX ORDER — FOLIC ACID 1 MG/1
3000 TABLET ORAL DAILY
Qty: 300 TABLET | Refills: 2 | Status: SHIPPED | OUTPATIENT
Start: 2023-12-01 | End: 2024-04-24

## 2023-12-01 RX ORDER — METHOTREXATE 2.5 MG/1
20 TABLET ORAL
Qty: 96 TABLET | Refills: 0 | Status: SHIPPED | OUTPATIENT
Start: 2023-12-01 | End: 2024-02-28

## 2023-12-01 ASSESSMENT — PAIN SCALES - GENERAL: PAINLEVEL: SEVERE PAIN (6)

## 2023-12-01 NOTE — PROGRESS NOTES
Rheumatology visit Note     Jamison is a 58 year old who is being evaluated via a billable video visit.      How would you like to obtain your AVS? MyChart  If the video visit is dropped, the invitation should be resent by: Text to cell phone: [phone]  Will anyone else be joining your video visit? No      Subjective : Jailene Breen is a 58 year old male with PMH of anxiety, obesity, seropositive rheumatoid arthritis, possible ankylosing spondylitis evaluated via a billable virtual visit for management of seropositive rheumatoid arthritis.    He was diagnosed with seropositive rheumatoid arthritis in 2016, established care with Dr. Raygoza in 5/2016 and was started on methotrexate.  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 He was started on Humira in 12/2016 due to AS.  Discontinued methotrexate in 2/2017 due to lack of improvement. Humira was changed to Enbrel in 4/2019 due to lack of improvement.  Methotrexate was restarted in July 2020.     Interim History : In the last couple months he had flare up, it starts in his hands, ankles and then in his shoulders. Can not lift his arm up. Both the shoulders were hurting bad. He is having diarrhea for last couple months. He has started a new antidepressant. He is on Enbrel but has not taken Enbrel for a month due to diarrhea issues.  He thinks that Enbrel helps with his joint pains.  He is on methotrexate 4 tablets once a week but cannot tell if it helps him or not.  He has prednisone tablets and take 20 mg once a month when his symptoms worsens.  He does not like to take prednisone since his anxiety symptoms get worse.    He has lumbar degenerative disc disease and gets MAKAYLA.  He has noticed urinary retention issues sporadically.    July 16, 2021 - For the last few months has been struggling with depression.  He was  admitted few times in the hospital due to acute worsening of depression.  He is following up with his psychiatrist.  Few new medications including Seroquel was started.  He feels his symptoms are gradually getting better.  For the last few days he has noticed decrease in his appetite.  He has follow-up with his psychiatrist in few weeks and will discuss about that.  He is also on methotrexate 4 tablets once a week and Enbrel 50 subcu once a week for RA.  Overall his RA is stable.  He gets flareups once every 2 months.  Gets flare up he has pain in his hands, wrists, shoulders, feet.      November 15, 2022 - He is on Enbrel, he has not had his shot this week and he can feel some pain in his joints. If he does little bit activity he gets very sweaty and SOB. His cholesterol is very high.  He will follow-up with primary care to check his cholesterol, cardiac work-up, TSH and the hormone levels.  He is on Methotrexate 4 tab once a week. He denies any RA flares.     May 16, 2023 - Towards the end of the week he still feels that he is more achy when the effect of Enbrel wears off. He has depression issues and is working with his PCP.  He wants to see therapist but does not have appointment until September.  He is also on methotrexate 15 mg once a week.  He needs updated methotrexate monitoring labs.    August 18, 2023 - He has bad headaches this morning, it woke him up at 3 AM. For the last couple days he has ringing in his ears. He is having more pain in his joints. He is off of Enbrel now for a month. He is just taking Methotrexate 8 tab once a week. He has not taken prednisone in a while. He takes 2 Tylenol and 2 Ibuprofen three times a day.     December 1, 2023 - He had COVID 2 weeks ago, he is Improving. He started Xeljanz in 9/23 and has noticed some improvement.  Denies any side effects with his medications.  He is also on Methotrexate 8 tab once a week.  Before he got sick with COVID infection his joint symptoms  were better.  He is still struggling with depression, gets worse in the winter.  He is following up with psychiatrist.  Denies any suicidal thoughts.     Review of systems negative except for those mentioned above    Reviewed past medical, surgical, family history    Pertinent labs:     Component      Latest Ref Rng 5/17/2023  10:07 AM   WBC      4.0 - 11.0 10e3/uL 6.4    RBC Count      4.40 - 5.90 10e6/uL 5.23    Hemoglobin      13.3 - 17.7 g/dL 15.5    Hematocrit      40.0 - 53.0 % 45.6    MCV      78 - 100 fL 87    MCH      26.5 - 33.0 pg 29.6    MCHC      31.5 - 36.5 g/dL 34.0    RDW      10.0 - 15.0 % 12.7    Platelet Count      150 - 450 10e3/uL 228    Quantiferon-TB Gold Plus Result      Negative  Negative    TB1 Ag minus Nil Value      IU/mL 0.00    TB2 Ag minus Nil Value      IU/mL 0.00    Mitogen minus Nil Result      IU/mL 9.95    Nil Result      IU/mL 0.05    Creatinine      0.66 - 1.25 mg/dL 0.84    GFR Estimate      >60 mL/min/1.73m2 >90    Hepatitis B [place]      Nonreactive  Nonreactive    Hep B Surface Agn      Nonreactive  Nonreactive    Hepatitis C Antibody      Nonreactive  Nonreactive    AST      0 - 45 U/L 30    ALT      0 - 70 U/L 48    Albumin      3.4 - 5.0 g/dL 3.7    CRP Inflammation      0.0 - 8.0 mg/L <2.9    Quantiferon Nil Tube      IU/mL 0.05    Quantiferon TB1 Tube      IU/mL 0.05    Quantiferon TB2 Tube 0.05    QUANTIFERON MITOGEN      IU/mL 10.00        Physical exam : Moderately built, appear stated age, cooperative  Musculoskeletal: Reports tenderness over MCP, PIP joints, bilateral wrists.  Reports tenderness over bilateral ankles, MTP joints, lower back.      Assessment     Seropositive rheumatoid arthritis  History of Ankylosing spondylitis based on MRI pelvis ( 6/2016 )  Rheumatoid factor- 78, anti-CCP antibody > 340 - 4/2016  HLA-B27 positive  MRI SI joint-June 2016-partial ankylosis of left SI joint    Seropositive rheumatoid arthritis: He has seropositive rheumatoid  arthritis (+ RF, + anti-CCP ) manifesting as a polyarticular symmetric arthritis.  He was on Enbrel 50 mg subcu once a week but his insurance denied it this year.  He was started on Xeljanz 11 mg daily in 9/23.  He is also on methotrexate 8 tabs once a week.  He noticed improvement on Xeljanz.  2 weeks ago he got COVID.  He held Xeljanz and methotrexate after that.  He has noticed that his joint pain symptoms have worsened while off of his medications.  COVID symptoms have improved now, denies any fever, shortness of breath.  He will restart methotrexate and Xeljanz in few days.    He is due for methotrexate monitoring labs.  Orders placed.     Ankylosing spondylitis: He was given diagnosis of ankylosing spondylitis in 2016 after MRI of the SI joints showed partial ankylosis.  He is HLA-B27 positive.  Overall his symptoms are stable.     Vaccinations: Vaccinations reviewed with Mr Breen. Risks and benefits of vaccinations were discussed.  - Influenza: encouraged yearly vaccination  -Pneumococcal 23 vaccine: 11/2022  - Zostavax: received 2 doses  - COVID : 2 doses and booster done.     Depression: He feels very depressed due to chronic disease and pain.  He does not have suicidal ideas.  He follows with primary care provider and psychiatrist for antidepressants.      Migraines and ringing in ears : He has appointment to see ENT in 2/24.  Still reports headache and ringing in his ears.     Plan     Continue Xeljanz 11 mg p.o. daily    Continue methotrexate 20 mg once a week    Methotrexate and Xeljanz monitoring labs to be done now and then every 3 months    Follow-up in 6 months.      Virtual Visit Details    Type of service:  Video Visit   Video Start Time: 10:06 AM  Video End Time:10:22 AM    Originating Location (pt. Location): Home    Distant Location (provider location):  On-site  Platform used for Video Visit: Cuba Parada MD  Staff Rheumatologist, [facility]   Division of  Rheumatic and Autoimmune diseases   Pager - [phone]

## 2023-12-01 NOTE — NURSING NOTE
Is the patient currently in the state of [place]? YES    Visit mode:VIDEO    If the visit is dropped, the patient can be reconnected by: VIDEO VISIT: Text to cell phone:   Telephone Information:   Mobile [phone]       Will anyone else be joining the visit? NO  (If patient encounters technical issues they should call [phone]    How would you like to obtain your AVS? MyChart    Are changes needed to the allergy or medication list? No    Reason for visit: No chief complaint on file.    Souleymane LIU

## 2023-12-01 NOTE — PATIENT INSTRUCTIONS
Continue Xeljanz 11 mg p.o. daily    Continue methotrexate 20 mg once a week    Methotrexate and Xeljanz monitoring labs to be done now and then every 3 months    Follow-up in 6 months.

## 2023-12-04 ENCOUNTER — TELEPHONE (OUTPATIENT)
Dept: RHEUMATOLOGY | Facility: CLINIC | Age: 58
End: 2023-12-04
Payer: COMMERCIAL

## 2023-12-04 NOTE — TELEPHONE ENCOUNTER
[employer] and [employer] message for PT to call [phone] to schedule 6 month f/u appt, June, with Dr Parada.

## 2023-12-05 ENCOUNTER — VIRTUAL VISIT (OUTPATIENT)
Dept: PSYCHOLOGY | Facility: CLINIC | Age: 58
End: 2023-12-05
Payer: COMMERCIAL

## 2023-12-05 DIAGNOSIS — F33.1 MAJOR DEPRESSIVE DISORDER, RECURRENT EPISODE, MODERATE (H): Primary | ICD-10-CM

## 2023-12-05 DIAGNOSIS — F41.1 GENERALIZED ANXIETY DISORDER: ICD-10-CM

## 2023-12-05 DIAGNOSIS — F41.0 PANIC DISORDER WITHOUT AGORAPHOBIA: ICD-10-CM

## 2023-12-05 PROCEDURE — 90834 PSYTX W PT 45 MINUTES: CPT | Mod: VID | Performed by: PSYCHOLOGIST

## 2023-12-06 RX ORDER — METHOTREXATE 2.5 MG/1
TABLET ORAL
Qty: 102 TABLET | OUTPATIENT
Start: 2023-12-06

## 2023-12-06 NOTE — TELEPHONE ENCOUNTER
methotrexate 2.5 MG tablet 96 tablet 0 12/1/2023     Should have refills on file. Pharmacy sent message. Rx refill denied    Marisa Mathews RN

## 2023-12-07 ENCOUNTER — TELEPHONE (OUTPATIENT)
Dept: PSYCHIATRY | Facility: CLINIC | Age: 58
End: 2023-12-07

## 2023-12-07 NOTE — TELEPHONE ENCOUNTER
Writer called pt after they did not arrive for scheduled appointment.    Left a message reminding pt this was a one-time follow up visit to check in on what we discussed in our last visit. Writer advised pt to continue their therapy work with Dr. Quezada and noted I would follow-up with both Dr. Quezada and Dr. Hernández (psychiatry).    Writer left pt number to the [facility] should they wish to leave a message for me.    Del Fishman, PhD LP on 12/7/2023 at 10:15 AM

## 2023-12-11 ENCOUNTER — VIRTUAL VISIT (OUTPATIENT)
Dept: PSYCHOLOGY | Facility: CLINIC | Age: 58
End: 2023-12-11
Payer: COMMERCIAL

## 2023-12-11 DIAGNOSIS — F33.1 MAJOR DEPRESSIVE DISORDER, RECURRENT EPISODE, MODERATE (H): Primary | ICD-10-CM

## 2023-12-11 DIAGNOSIS — F41.0 PANIC DISORDER WITHOUT AGORAPHOBIA: ICD-10-CM

## 2023-12-11 DIAGNOSIS — F41.1 GENERALIZED ANXIETY DISORDER: ICD-10-CM

## 2023-12-11 PROCEDURE — 90834 PSYTX W PT 45 MINUTES: CPT | Mod: VID | Performed by: PSYCHOLOGIST

## 2023-12-11 NOTE — PROGRESS NOTES
"    [facility]                                     Progress Note    Patient Name: Jailene Breen  Date: 12/11/23         Service Type: Individual      Session Start Time: 1:10pm Session End Time: 1:57pm     Session Length: 47 minutes    Session #: 5    Attendees: Client attended alone    Service Modality:  Video Visit:      Provider verified identity through the following two step process.  Patient provided:  Patient photo    Telemedicine Visit: The patient's condition can be safely assessed and treated via synchronous audio and visual telemedicine encounter.      Reason for Telemedicine Visit: Services only offered telehealth    Originating Site (Patient Location): Patient's home    Distant Site (Provider Location): [facility]: Megan Hooker    Consent:  The patient/guardian has verbally consented to: the potential risks and benefits of telemedicine (video visit) versus in person care; bill my insurance or make self-payment for services provided; and responsibility for payment of non-covered services.     Patient would like the video invitation sent by:  Send to e-mail at: [email or web address]    Mode of Communication:  Video Conference via [employer]    Distant Location (Provider):  On-site    As the provider I attest to compliance with applicable laws and regulations related to telemedicine.    DATA  Interactive Complexity: No  Crisis: No        Progress Since Last Session (Related to Symptoms / Goals / Homework):   Symptoms: No change : Stable - Depression still present.    Homework: Partially completed      Episode of Care Goals: Satisfactory progress - ACTION (Actively working towards change); Intervened by reinforcing change plan / affirming steps taken     Current / Ongoing Stressors and Concerns:   Client experiencing depression.    Notes from 12/11/23 Session:  Client feels \"more calm today.\"  HW:  \"I didn't go for the walks because of the weather (bad).  I did go for a " "ride though with the dogs.\"    Thinks that he may have gotten more sleep last night and \"I took my meds.\"  HW: Enc cl to think about Why things are different, why aren't they, identified barriers.    Cl recalled having a \"weird dream.  I was able to fly.\"  Cl recalled more of the dream as he talked today.    \"I do like exploring.\"  \"I like traveling.\"  What is barrier to doing more?  \"I think it's depression.  I want to do stuff but when it comes to doing it I don't have the energy.\"  HW:  Challenged cl to think about  how he was able to get himself to go for a jog when he was in the habit and how to transfer that to help with his current motivation.  HW:  Also, set a goal and prepare ahead of time to work on that goal (get walking clothes ready the night before).       Treatment Objective(s) Addressed in This Session:   Increase interest, engagement, and pleasure in doing things  Decrease frequency and intensity of feeling down, depressed, hopeless  Checked in on homework; Assigned new homework.  Started Treatment Plan; Explained therapeutic strategies (pattern recognition).       Intervention:   CBT: Pattern recognition; Socratic Questioning; Review and assigned homework; Problem solve; Brief description of therapeutic strategy.  Active listening, empathy, validation, and other rapport building skills; Motivational Interviewing.    Assessments completed prior to visit:  The following assessments were completed by patient for this visit:  None.     ASSESSMENT: Current Emotional / Mental Status (status of significant symptoms):   Risk status (Self / Other harm or suicidal ideation)   Patient denies current fears or concerns for personal safety.   Patient denies current or recent suicidal ideation or behaviors.   Patient denies current or recent homicidal ideation or behaviors.   Patient denies current or recent self injurious behavior or ideation.   Patient denies other safety concerns.   Patient reports there has " been no change in risk factors since their last session.     Patient reports there has been no change in protective factors since their last session.     Recommended that patient call 911 or go to the local ED should there be a change in any of these risk factors.     Appearance:   Appropriate    Eye Contact:   Fair    Psychomotor Behavior: Normal    Attitude:   Cooperative  Pleasant   Orientation:   All   Speech    Rate / Production: Normal/ Responsive    Volume:  Normal    Mood:    Normal Sad    Affect:    Appropriate    Thought Content:  Clear    Thought Form:  Coherent  Logical    Insight:    Good      Medication Review:   No changes to current psychiatric medication(s)     Medication Compliance:   Yes     Changes in Health Issues:   None reported     Chemical Use Review:   Substance Use: Chemical use reviewed, no active concerns identified      Tobacco Use: Did not address this session.    Diagnosis:  1. Major Depressive Disorder, Recurrent, Moderate, With melancholic features (H)    2. Generalized Anxiety Disorder    3. Panic Disorder        Collateral Reports Completed:   Not Applicable    PLAN: (Patient Tasks / Therapist Tasks / Other)  The client will get out and walk his dog daily (as often as possible) in order to start to manage depression.  He will prepare for walks the night before by setting out outdoor wear.  He will also think about what skills he used when he used to job routinely (and try to apply to activities/walks now).  He made a follow-up appointment for December 18, 2023.      Haim Quezada PsyD                                                    ______________________________________________________________________    Individual Treatment Plan    Patient's Name: Jailene Breen  YOB: 1965    Date of Creation: 12/11/23  Date Treatment Plan Last Reviewed/Revised: 12/11/23    DSM5 Diagnoses: 296.32 (F33.1) Major Depressive Disorder, Recurrent Episode, Moderate With  "melancholic features or 300.01 (F41.0) Panic Disorder  300.02 (F41.1) Generalized Anxiety Disorder  Psychosocial / Contextual Factors: Client is on disability and does not work; Multiple health concerns.  PROMIS (reviewed every 90 days): 15 (on 10/24/23).    Referral / Collaboration:  Referral to another professional/service is not indicated at this time..    Anticipated number of session for this episode of care:  18-20  Anticipation frequency of session: Every other week  Anticipated Duration of each session: 38-52 minutes  Treatment plan will be reviewed in 90 days or when goals have been changed.       MeasurableTreatment Goal(s) related to diagnosis / functional impairment(s)  Goal 1: Patient wants \"to get my life back.  Not let this depression take control.  Be productive.\"    I will know I've met my goal when TBD.      Objective #A \"I'm just trying to find that motivation to get up.\"    Patient will Increase interest, engagement, and pleasure in doing things  Decrease frequency and intensity of feeling down, depressed, hopeless.  Status: New - Date: 12/11/23      Intervention(s)  Therapist will teach  skills to manage depression and assign homework as appropriate .    Objective #B  TBD (if needed)  Patient will  TBD .  Status:  TBD      Intervention(s)  Therapist will  TBD .      Patient has not reviewed nor agreed to the above plan.        Haim Quezada PsyD  December 11, 2023  "

## 2023-12-18 ENCOUNTER — VIRTUAL VISIT (OUTPATIENT)
Dept: PSYCHOLOGY | Facility: CLINIC | Age: 58
End: 2023-12-18
Payer: COMMERCIAL

## 2023-12-18 DIAGNOSIS — F33.1 MAJOR DEPRESSIVE DISORDER, RECURRENT EPISODE, MODERATE (H): Primary | ICD-10-CM

## 2023-12-18 DIAGNOSIS — F41.1 GENERALIZED ANXIETY DISORDER: ICD-10-CM

## 2023-12-18 DIAGNOSIS — F41.0 PANIC DISORDER WITHOUT AGORAPHOBIA: ICD-10-CM

## 2023-12-18 PROCEDURE — 90834 PSYTX W PT 45 MINUTES: CPT | Mod: VID | Performed by: PSYCHOLOGIST

## 2023-12-18 NOTE — PROGRESS NOTES
"    [facility]                                     Progress Note    Patient Name: Jailene Breen  Date: 12/18/23         Service Type: Individual      Session Start Time: 1:01pm Session End Time: 1:51pm     Session Length: 50 minutes    Session #: 6    Attendees: Client attended alone    Service Modality:  Video Visit:      Provider verified identity through the following two step process.  Patient provided:  Patient photo    Telemedicine Visit: The patient's condition can be safely assessed and treated via synchronous audio and visual telemedicine encounter.      Reason for Telemedicine Visit: Services only offered telehealth    Originating Site (Patient Location): Patient's home    Distant Site (Provider Location): [facility]: Megan Gurabo    Consent:  The patient/guardian has verbally consented to: the potential risks and benefits of telemedicine (video visit) versus in person care; bill my insurance or make self-payment for services provided; and responsibility for payment of non-covered services.     Patient would like the video invitation sent by:  Send to e-mail at: [email or web address]    Mode of Communication:  Video Conference via [employer]    Distant Location (Provider):  On-site    As the provider I attest to compliance with applicable laws and regulations related to telemedicine.    DATA  Interactive Complexity: No  Crisis: No        Progress Since Last Session (Related to Symptoms / Goals / Homework):   Symptoms: No change : Stable - Depression still present.    Homework: Partially completed      Episode of Care Goals: Satisfactory progress - ACTION (Actively working towards change); Intervened by reinforcing change plan / affirming steps taken     Current / Ongoing Stressors and Concerns:   Client experiencing depression.    Notes from 12/18/23 Session:  \"It's zhang a hell of a couple days.  Yesterday, the client was woken up by the dog barking in the morning.  I was " "walking around and felt weird.  Then my body started shaking.  I thought I was going to die.  I had my wife bring me into the ER.\"    When he woke up he felt \"something felt wrong.  I felt like I was here but not here.\"    Taught on Grounding Techniques, reviewed panic attack skills (Mindfulness).    Sent list of Grounding Techniques to start.        An abnormality came up in my blood work and they said it's normally a blockage.  Had cl take a deep breathe to relax as he retold the story from yesterday.  Cl had a panic attack.         Treatment Objective(s) Addressed in This Session:   Identified and educated on dissociations (cl may have experienced yesterday)  Increase interest, engagement, and pleasure in doing things  Decrease frequency and intensity of feeling down, depressed, hopeless  Checked in on homework; Assigned new homework.  Encouraged client to try to recognize what helped him get out and go for walks this past week.       Intervention:   CBT: Pattern recognition; Socratic Questioning; Review and assigned homework; Problem solve; Taught on Grounding Technique and Dissociating.  Active listening, empathy, validation, and other rapport building skills' Sent client information on Grounding Techniques (via [email or web address], email).    Assessments completed prior to visit:  The following assessments were completed by patient for this visit:  None.     ASSESSMENT: Current Emotional / Mental Status (status of significant symptoms):   Risk status (Self / Other harm or suicidal ideation)   Patient denies current fears or concerns for personal safety.   Patient denies current or recent suicidal ideation or behaviors.   Patient denies current or recent homicidal ideation or behaviors.   Patient denies current or recent self injurious behavior or ideation.   Patient denies other safety concerns.   Patient reports there has been no change in risk factors since their last session.     Patient reports there has been no " change in protective factors since their last session.     Recommended that patient call 911 or go to the local ED should there be a change in any of these risk factors.     Appearance:   Appropriate    Eye Contact:   Fair    Psychomotor Behavior: Normal    Attitude:   Cooperative  Pleasant   Orientation:   All   Speech    Rate / Production: Normal/ Responsive    Volume:  Normal    Mood:    Normal Sad    Affect:    Appropriate    Thought Content:  Clear    Thought Form:  Coherent  Logical    Insight:    Good      Medication Review:   No changes to current psychiatric medication(s)     Medication Compliance:   Yes     Changes in Health Issues:   None reported     Chemical Use Review:   Substance Use: Chemical use reviewed, no active concerns identified      Tobacco Use: Did not address this session.    Diagnosis:  1. Major Depressive Disorder, Recurrent, Moderate, With melancholic features (H)    2. Generalized Anxiety Disorder    3. Panic Disorder        Collateral Reports Completed:   Not Applicable    PLAN: (Patient Tasks / Therapist Tasks / Other)  The client will continue to get out and walk his dog daily (as often as possible) in order to start to manage depression.  He will try to identify skills he used to get himself to go for a walk.  Client will look into Grounding Techniques and practice, or experiment, with them.   He made a follow-up appointment for January 4, 2024.      Haim Quezada PsyD                                                    ______________________________________________________________________    Individual Treatment Plan    Patient's Name: Jailene Breen  YOB: 1965    Date of Creation: 12/11/23  Date Treatment Plan Last Reviewed/Revised: 12/11/23    DSM5 Diagnoses: 296.32 (F33.1) Major Depressive Disorder, Recurrent Episode, Moderate With melancholic features or 300.01 (F41.0) Panic Disorder  300.02 (F41.1) Generalized Anxiety Disorder  Psychosocial / Contextual  "Factors: Client is on disability and does not work; Multiple health concerns.  PROMIS (reviewed every 90 days): 15 (on 10/24/23).    Referral / Collaboration:  Referral to another professional/service is not indicated at this time..    Anticipated number of session for this episode of care:  18-20  Anticipation frequency of session: Every other week  Anticipated Duration of each session: 38-52 minutes  Treatment plan will be reviewed in 90 days or when goals have been changed.       MeasurableTreatment Goal(s) related to diagnosis / functional impairment(s)  Goal 1: Patient wants \"to get my life back.  Not let this depression take control.  Be productive.\"    I will know I've met my goal when TBD.      Objective #A \"I'm just trying to find that motivation to get up.\"    Patient will Increase interest, engagement, and pleasure in doing things  Decrease frequency and intensity of feeling down, depressed, hopeless.  Status: New - Date: 12/11/23      Intervention(s)  Therapist will teach  skills to manage depression and assign homework as appropriate .    Objective #B  TBD (if needed)  Patient will  TBD .  Status:  TBD      Intervention(s)  Therapist will  TBD .      Patient has not reviewed nor agreed to the above plan.        Haim Quezada PsyD  December 18, 2023  "

## 2023-12-19 ENCOUNTER — VIRTUAL VISIT (OUTPATIENT)
Dept: PSYCHIATRY | Facility: CLINIC | Age: 58
End: 2023-12-19
Attending: PSYCHIATRY & NEUROLOGY
Payer: COMMERCIAL

## 2023-12-19 DIAGNOSIS — F41.0 GENERALIZED ANXIETY DISORDER WITH PANIC ATTACKS: ICD-10-CM

## 2023-12-19 DIAGNOSIS — F33.1 MAJOR DEPRESSIVE DISORDER, RECURRENT EPISODE, MODERATE (H): Primary | ICD-10-CM

## 2023-12-19 DIAGNOSIS — F51.05 INSOMNIA DUE TO OTHER MENTAL DISORDER: ICD-10-CM

## 2023-12-19 DIAGNOSIS — F43.10 PTSD (POST-TRAUMATIC STRESS DISORDER): ICD-10-CM

## 2023-12-19 DIAGNOSIS — F99 INSOMNIA DUE TO OTHER MENTAL DISORDER: ICD-10-CM

## 2023-12-19 DIAGNOSIS — F41.1 GENERALIZED ANXIETY DISORDER WITH PANIC ATTACKS: ICD-10-CM

## 2023-12-19 PROCEDURE — 90833 PSYTX W PT W E/M 30 MIN: CPT | Mod: VID

## 2023-12-19 PROCEDURE — 99214 OFFICE O/P EST MOD 30 MIN: CPT | Mod: VID

## 2023-12-19 RX ORDER — LANOLIN ALCOHOL/MO/W.PET/CERES
3 CREAM (GRAM) TOPICAL EVERY EVENING
Qty: 90 TABLET | Refills: 0 | Status: SHIPPED | OUTPATIENT
Start: 2023-12-19 | End: 2024-04-09

## 2023-12-19 RX ORDER — PRAZOSIN HYDROCHLORIDE 2 MG/1
2 CAPSULE ORAL AT BEDTIME
Qty: 90 CAPSULE | Refills: 0 | Status: SHIPPED | OUTPATIENT
Start: 2023-12-19 | End: 2024-04-09

## 2023-12-19 RX ORDER — QUETIAPINE FUMARATE 150 MG/1
150 TABLET, FILM COATED ORAL AT BEDTIME
Qty: 90 TABLET | Refills: 0 | Status: SHIPPED | OUTPATIENT
Start: 2023-12-19 | End: 2024-02-16

## 2023-12-19 RX ORDER — FLUOXETINE 40 MG/1
80 CAPSULE ORAL DAILY
Qty: 180 CAPSULE | Refills: 0 | Status: SHIPPED | OUTPATIENT
Start: 2023-12-19 | End: 2024-04-09

## 2023-12-19 ASSESSMENT — PATIENT HEALTH QUESTIONNAIRE - PHQ9
SUM OF ALL RESPONSES TO PHQ QUESTIONS 1-9: 11
SUM OF ALL RESPONSES TO PHQ QUESTIONS 1-9: 11
10. IF YOU CHECKED OFF ANY PROBLEMS, HOW DIFFICULT HAVE THESE PROBLEMS MADE IT FOR YOU TO DO YOUR WORK, TAKE CARE OF THINGS AT HOME, OR GET ALONG WITH OTHER PEOPLE: VERY DIFFICULT

## 2023-12-19 ASSESSMENT — PAIN SCALES - GENERAL: PAINLEVEL: EXTREME PAIN (8)

## 2023-12-19 NOTE — PROGRESS NOTES
[facility]  MEDICAL PROGRESS NOTE     CARE TEAM:    PCP- Aiden Resendez  Therapist-  Haim Quezada PsyD   Rheum- MD Karma and Pain-JUANITA Pike      Jamison is a 58 year old who uses the pronouns him, his.        Diagnoses     Major depressive disorder, recurrent, moderate  Generalized anxiety disorder, with panic  PTSD     Medical Diagnoses:  Chronic pain  Rheumatoid arthritis  Ankylosing spondylitis (HLA-B27 positive)  Lumbar degenerative disc disease  Hypertension  Hyperlipidemia     Assessment     Jamison is a 58 year old  man who is seen today for follow up for  MDD, JEFF, and PTSD in the context of significant medical co-morbidities including rheumatoid arthritis and chronic pain. Significant symptoms have included depressed mood, anhedonia, poor sleep, and avolition. Jamison is currently taking medical cannabis which might play a role in mood changes.      Today, Jamison reports more anxiety and a panic attack that let to an ED visit. Although Jamison does not identify any specific trigger for this episode of panic attack, he has had previous panic attack episodes that appeared to be triggered by trauma memories. Jamison does not appear to be at imminent risk of harm to himself or others. He is tolerating medications well. Discussed the R/B/A of increasing the dose of prazosin to help with hyperarousal. Jamison is continuing psychotherapy and has been working on behavioral activation to help with his motivation. He would benefit from trauma-focused therapy in the future.        Future considerations:  -Levomilnacipran  -TCAs  - Trauma-focused therapy  -Follow up on memory concerns- cognitive screen     Psychotropic Drug Interactions:  [PSYCHCLINICDDI]  ADDITIVE SEROTONERGIC: fluoxetine, Suboxone  ADDITIVE QTc: fluoxetine, quetiapine, Suboxone  ADDITIVE ORTHOSTASIS: prazosin, buprenorphine  ADDITIVE CNS/RESPIRATORY DEPRESSION:  Suboxone, quetiapine, oxycodone  Management: routine monitoring    [place] was checked today: indicates that controlled prescriptions have been filled as prescribed    Risk Statements:   Treatment Risk- Risks, benefits, alternatives and potential adverse effects have been discussed and are understood.   Safety Risk-Les did not appear to be an imminent safety risk to self or others.     Plan     1) Medications:   - Continue quetiapine (Seroquel) 150 mg at bedtime    - Continue fluoxetine (PROZAC) 80 mg daily  - Increase prazosin (MINIPRESS) from 1 mg to 2 mg at bedtime   - Continue melatonin 3 mg 3-4 hours before desired bedtime     Light box use      Other:  - Suboxone  - Methotrexate  - Oxycodone (per pain clinic for pain flare short term)  - Medical cannabis (per pain clinic)     2) Therapy- Recommend- Currently CBT. Would also benefit from trauma-focused therapy      3) Next Due   Labs - AP labs last ordered on 6/15/2023 but not completed - encouraged patient to complete these labs prior to the next visit  EKG - last EKG 12/8/2022.  Rating scales- AIMS, will complete during next in person visit     4) Referral -Previously SW for resources (55+ group? Financial resources? Transportation resources? )      5) Other: None    6) RTC: 4 weeks       Pertinent Background                                                   [most recent eval 07/11/23]     Copied from prior notes and reviewed with patient    Originally, diagnosed with depression and anxiety in 2015 but first experienced symptoms of depression in middle school. He most recently had a significant worsening of symptoms in 2017 after he was diagnosed with rheumatoid arthritis, ankylosing spondylitis ended up with a knee injury and was no longer able to work or run which he used as his primary coping mechanism.     Pertinent Items Include: suicidal ideation, SIB [cutting], multiple psychotropic trials , severe med reaction, psych hosp (<3), SUBSTANCE USE: alcohol,  substance use treatment  and Major Medical Problems (Rheumatoid Arthritis and Ankylosing Spondylitis)     Subjective   Jamison freeman seen on 11/21/2023 during which no changes were made.   Since last visit Jamison notes:    Sunday was a horrible day  The dog woke him up around 6:30 then his knees started shaking, feeling like was having a heart attack, feeling weird, felt like was getting adrenaline surge, started at gut and would go up to head, shaky at the end, happening over and over, hard to breathe, breathing fast, chest pain- muscle was twitching over chest, did not think was going to make it, cold sweat, did not feel like he was in his own body, took quetiapine, this brought him back to an experience in 2022 on new yr's marisol when he had a similar experience.   Does not think it is a panic attack because it is different from panic attacks he has had in the past in that this time there was no identifiable trigger.   Thoughts during the episode- was trying to catch breathe and was wondering why the dog barked   Went to the ER     More anxiety yesterday but feels okay today    Mood- it's been okay, noting really sticks out     Holidays- Usually hard but tries to cope by focusing on wife and kids    It's hard when he goes to sleep- thinks about mom- worries    Sleep- has been sleeping pretty good for the last couple of days- has not had nightmares for a few days     SI- denies     Meds- fine, twitches once in a while- kicks the table when sleeping, grabs things in sleep     Pain- still hurting from RA    Still taking liothyronine- did not taper it off as previously discussed- plans to finish off the bottle     SI- denies        Recent Psych Symptoms:   Elevated:  none  Psychosis:  none    Current Social History:  Financial/occupational: On disability. Was planning to complete studies at [employer] for a degree in psychology- has a couple of credits left to complete degree; unable to work due to diagnosis of  "rheumatoid arthritis and ankylosing spondylitis   Living situation (partner, children, pets, etc): [place], house with wife and daughter 27 yo at home, son is in [place], two dogs - lost one  Social/spiritual support: Talks to  every two weeks, not many friends, family is supportive, likes walking and hiking  Feels safe at home: Yes    Pertinent Substance Use:   Alcohol: No   Cannabis: Yes: On medical cannabis- has not used it for a while because can't afford it   Tobacco: No  Caffeine:  No   Opioids: No   Narcan Kit current: Yes  Other substances: none    Medical Review of Systems:   Lightheadedness/orthostasis: None  Headaches: less, happens once in a while   GI: None     Mental Status Exam     Alertness: alert  and oriented  Appearance: casually groomed  Behavior/Demeanor: cooperative, pleasant and calm, with fair  eye contact   Speech: normal and regular rate and rhythm  Language: no obvious problem  Psychomotor: normal or unremarkable  Mood:   \"It's been okay, noting really sticks out. More anxiety yesterday I feel okay today\"  Affect: restricted and appropriate; congruent to: mood- yes; content: no   Thought Content:  Reports none;  Denies suicidal ideation, violent ideation and delusions   Perception:  Reports none;  Denies hallucinations  Insight: adequate  Judgment: adequate for safety  Cognition: does  appear grossly intact; formal cognitive testing was not done  Gait and Station: N/A (telehealth)     Past Psych Med Trials     Copied from prior note and reviewed with patient    Medication  Dose   (mg) Effect  Dates of Use   Fluoxetine 40-80 Felt like was initially helpful 2016 - 2017 2022-2023   Sertraline 100 Effective initially, eventually self discontinued as not helpful 2018 - 7/2020   Duloxetine 30 BID Used to treat nerve pain; felt weird on it 2017   Bupropion  BID ineffective 2017 - 2019   Nortriptyline 50 For pain 2017 -  3/2019   Mirtazapine   Dissociated for entire first week after " "taking  Per MTM on 5/15/23: \"\"some kind of psychosis\". States that he was also taking oxycodone, alprazolam, fentanyl patches at the time\" 2012   Trazodone 50   2013             Hydroxyzine 25 QID prn For anxiety and panic attacks; not effective for severe anxiety 2015 - 2017   Buspirone 15 TID   2016 - 2017   Gabapentin 100 TID   2013 - 2016   Alprazolam 0.5 TID For anxiety 2069-4946   Diazepam 2 BID For anxiety 2016   Lorazepam 1 TID For anxiety 2016   Quetiapine  25-50   1832-2637   Clonazepam 1 For anxiety 2016         Treatment Course and Davis Events Since   July 2023     -07/2023- Transfer of care-no med changes  -08/2023- Taper down and discontinue liothyronine  -10/2023- More depressed. Increased quetiapine from 25 mg at bedtime to 150 mg at bedtime; started melatonin  - 12/2023- Increased the dose of prazosin to 2 mg at bedtime        Vitals     There were no vitals taken for this visit.  Pulse Readings from Last 3 Encounters:   08/09/23 72   07/20/23 85   05/17/23 69     Wt Readings from Last 3 Encounters:   12/08/22 131.5 kg (290 lb)   05/02/22 122.7 kg (270 lb 8 oz)   03/16/22 125.3 kg (276 lb 3.2 oz)     BP Readings from Last 3 Encounters:   08/09/23 125/79   07/20/23 (!) 146/91   05/17/23 (!) 149/92         Medical History     ALLERGIES: Mirtazapine, Hydrocodone, Mirtazapine, and Ms contin [morphine]    Patient Active Problem List   Diagnosis    CARDIOVASCULAR SCREENING; LDL GOAL LESS THAN 160    Obesity, Class I, BMI 30-34.9    Tear meniscus knee, right, initial encounter    Rheumatoid arthritis involving multiple sites, unspecified rheumatoid factor presence    Chronic pain    Right-sided thoracic back pain, unspecified chronicity    Generalized anxiety disorder    Panic attack    Ankylosing spondylitis (H)    Moderate major depression (H)    Immunocompromised (H24)    Essential hypertension with goal blood pressure less than 140/90    Suicidal ideation    Major depressive disorder, recurrent episode, " severe with mixed features (H)    Chronic back pain greater than 3 months duration    Contact dermatitis and eczema    Dermatitis    Drug dependence (H)    Encounter for screening colonoscopy    Esophageal reflux    Hematuria    Hyperlipidemia    Lumbar radiculopathy    Plantar fascial fibromatosis    Sprain of sacroiliac region    Overweight    Nonintractable migraine    Narcotic abuse, continuous (H)    Morbid obesity (H)        Medications     Current Outpatient Medications   Medication Sig Dispense Refill    atorvastatin (LIPITOR) 20 MG tablet TAKE 1 TABLET BY MOUTH DAILY 90 tablet 0    buprenorphine (SUBUTEX) 8 MG SUBL sublingual tablet Place 1 tablet (8 mg) under the tongue 3 times daily Fill 11/30. Start December 2, 2023. 30 day script. 90 tablet 0    FLUoxetine (PROZAC) 40 MG capsule Take 2 capsules (80 mg) by mouth daily 180 capsule 0    folic acid (FOLVITE) 1 MG tablet Take 3 tablets (3,000 mcg) by mouth daily 300 tablet 2    medical cannabis (Patient's own supply) See Admin Instructions (The purpose of this order is to document that the patient reports taking medical cannabis.  This is not a prescription, and is not used to certify that the patient has a qualifying medical condition.)     Pills PRN   Lozenges PRN      melatonin 3 MG tablet Take 1 tablet (3 mg) by mouth every evening . Take 3-4 hours before your desired bedtime. 90 tablet 0    methotrexate 2.5 MG tablet Take 8 tablets (20 mg) by mouth every 7 days 96 tablet 0    naloxone (NARCAN) 4 MG/0.1ML nasal spray Spray 1 spray (4 mg) into one nostril alternating nostrils as needed for opioid reversal every 2-3 minutes until assistance arrives 0.2 mL 1    oxyCODONE (ROXICODONE) 5 MG tablet Take 1 tablet (5 mg) by mouth every 6 hours as needed for severe pain Max of 3/day. Fill/begin November 15, 2023 Short term script for acute pain flare and COVID infection, off of his anti-rheum meds 42 tablet 0    prazosin (MINIPRESS) 1 MG capsule Take 1 capsule (1  mg) by mouth at bedtime 90 capsule 0    QUEtiapine (SEROQUEL) 50 MG tablet Take 1 tablet (50 mg) by mouth at bedtime for 7 days, THEN 2 tablets (100 mg) at bedtime for 7 days, THEN 3 tablets (150 mg) at bedtime for 76 days. 249 tablet 0    tiZANidine (ZANAFLEX) 4 MG tablet Take 1 tablet (4 mg) by mouth 3 times daily as needed for muscle spasms 75 tablet 2    tiZANidine (ZANAFLEX) 4 MG tablet Take 1 tablet (4 mg) by mouth 3 times daily as needed for muscle spasms 45 tablet 1    tofacitinib (XELJANZ XR) 11 MG 24 hr tablet Take 1 tablet (11 mg) by mouth daily Hold for signs of infection, then seek medical attention. (Patient not taking: Reported on 8/18/2023) 30 tablet 5        Labs and Data         7/12/2023     5:00 PM 9/18/2023    10:31 AM 10/24/2023     9:47 AM   PROMIS-10 Total Score w/o Sub Scores   PROMIS TOTAL - SUBSCORES 23 14 15         6/12/2023     8:05 PM 7/12/2023     5:00 PM 11/20/2023     3:06 PM   CAGE-AID Total Score   Total Score 0 0 4   Total Score MyChart 0 (A total score of 2 or greater is considered clinically significant)           11/21/2023     9:36 AM 11/30/2023    10:31 AM 12/19/2023     9:55 AM   PHQ-9 SCORE   PHQ-9 Total Score MyChart 12 (Moderate depression)  11 (Moderate depression)   PHQ-9 Total Score 12 15 11         6/12/2023     8:06 PM 7/12/2023     5:00 PM 11/20/2023     3:06 PM   JEFF-7 SCORE   Total Score 8 (mild anxiety)     Total Score 8 5 3     Recent Labs   Lab Test 12/08/22  1427 12/08/22  1425 10/11/21  1123   GLC  --  96 97   A1C 5.5  --  5.2     Recent Labs   Lab Test 12/08/22  1425 10/11/21  1123   CHOL 224* 209*   TRIG 230* 225*   * 134*   HDL 25* 30*     Recent Labs   Lab Test 05/17/23  1007 12/08/22  1425 08/31/22  1405 10/11/21  1123   AST 30 32   < > 37   ALT 48 49   < > 47   ALKPHOS  --  112  --  102    < > = values in this interval not displayed.     Recent Labs   Lab Test 05/17/23  1007 12/08/22  1425 08/31/22  1405 10/11/21  1123   WBC 6.4 7.7 6.0 7.0    ANEUTAUTO  --  5.3 3.2 4.4   HGB 15.5 15.6 15.1 16.2    225 213 232          PROVIDER: Zain Hernández MD    Level of Medical Decision Making:   - At least 1 chronic problem that is not stable  - Engaged in prescription drug management during visit (discussed any medication benefits, side effects, alternatives, etc.)           Psychiatry Individual Psychotherapy Note   Psychotherapy start time - 10:24 AM  Psychotherapy end time - 10:40 AM  Date last reviewed with patient - 12/19/23  Subjective: This supportive psychotherapy session addressed issues related to goals of therapy and current psychosocial stressors. Patient's reaction: Action in the context of mental status appropriate for ambulatory setting.  Progress: action  Interactive complexity indicated? No  Plan: RTC in timeframe noted above  Psychotherapy services during this visit included myself and the patient.   Treatment Plan      SYMPTOMS; PROBLEMS   MEASURABLE GOALS;    FUNCTIONAL IMPROVEMENT / GAINS INTERVENTIONS DISCHARGE CRITERIA   Depression: insomnia and avolition   Choose at least one enjoyable activity and start behavioral activation and improve sleep hygiene Supportive / psychodynamic marked symptom improvement         Patient staffed in clinic with Dr. Gardiner who will sign the note.  Supervisor is Dr. Hunter.

## 2023-12-19 NOTE — PROGRESS NOTES
Virtual Visit Details    Type of service:  Video Visit   Video Start Time:  10:10 AM  Video End Time: 10:40 AM    Originating Location (pt. Location): Home    Distant Location (provider location):  On-site  Platform used for Video Visit: [place]

## 2023-12-19 NOTE — PATIENT INSTRUCTIONS
It was nice seeing you today     Treatment Plan Today:      1) Medications  -Increase the dose of prazosin to 2 mg at bedtime (a new prescription for 2 mg capsules has been sent)  -Continue the other medications unchanged      2) Please complete previously ordered labs    3) Please return to clinic in 1 month      4) Crisis numbers are below and clinic after hours number is [phone]      **For crisis resources, please see the information at the end of this document**   Patient Education    Thank you for coming to the [facility] [place] [facility].     Lab Testing:  If you had lab testing today and your results are reassuring or normal they will be mailed to you or sent through [employer] within 7 days. If the lab tests need quick action we will call you with the results. The phone number we will call with results is # [phone]. If this is not the best number please call our clinic and change the number.     Medication Refills:  If you need any refills please call your pharmacy and they will contact us. Our fax number for refills is [phone].   Three business days of notice are needed for general medication refill requests.   Five business days of notice are needed for controlled substance refill requests.   If you need to change to a different pharmacy, please contact the new pharmacy directly. The new pharmacy will help you get your medications transferred.     Contact Us:  Please call [phone] during business hours (8-5:00 M-F).   If you have medication related questions after clinic hours, or on the weekend, please call [phone].     Financial Assistance [phone]   Medical Records [phone]       MENTAL HEALTH CRISIS RESOURCES:  For a emergency help, please call 911 or go to the nearest Emergency Department.     Emergency Walk-In Options:   Em[facility] Unit @ [facility] ([place]): [phone] - Specialized mental health emergency area designed to be  [facility] ([place]): [phone]  [facility] ([place]): [phone]  [facility] ([place]): [phone]    County Crisis Information:   [place]: [phone]  [place]: [phone]  [place] ([place]) - Adult: [phone]     Child: [phone]  [place] - Adult: [phone]     Child: [phone]  [place]: [phone]  List of all [facility] resources:   https://[place].gov/dhs/people-we-serve/adults/health-care/mental-health/resources/crisis-contacts.jsp    National Crisis Information:   Crisis Text Line: Text  [place]  to 156605  Suicide & Crisis Lifeline: 988  National Suicide Prevention Lifeline: 4-904-092-TALK (1-194.801.5855)       For online chat options, visit https://suicidepreventionlifeline.org/chat/  Poison Control Center: 1-912.715.2815  Trans Lifeline: 1-671.538.8382 - Hotline for transgender people of all ages  The Tu Project: 2-383-942-3896 - Hotline for LGBT youth     For Non-Emergency Support:   Fast Tracker: Mental Health & Substance Use Disorder Resources -   https://www.[employer].org/

## 2023-12-19 NOTE — NURSING NOTE
Is the patient currently in the state of [place]? YES    Visit mode:VIDEO    If the visit is dropped, the patient can be reconnected by: VIDEO VISIT: Text to cell phone:   Telephone Information:   Mobile [phone]    and VIDEO VISIT: Send to e-mail at: [email or web address]    Will anyone else be joining the visit? NO  (If patient encounters technical issues they should call [phone])    How would you like to obtain your AVS? MyChart    Are changes needed to the allergy or medication list? No    Reason for visit: BERTO LIU

## 2023-12-20 ENCOUNTER — TELEPHONE (OUTPATIENT)
Dept: RHEUMATOLOGY | Facility: CLINIC | Age: 58
End: 2023-12-20
Payer: COMMERCIAL

## 2023-12-20 NOTE — TELEPHONE ENCOUNTER
Form filled out and sent to [place] for provider to review and sign when able.      Renetta Swan RN

## 2023-12-20 NOTE — TELEPHONE ENCOUNTER
FREE DRUG APPLICATION INITIATED    Medication:  XELJANZ  Free Drug Program Name:  Xelsource  Date Submitted: 12/20/2023 11:22 AM  Phone #: [phone]  Fax #: [phone]  Additional Information:     Writer send message to care team via email with forms to complete         BONG Pressley, [facility]  Specialty Pharmacy Clinic Liaison     [facility] Specialty    [email or web address]     Phone: [phone]  Fax: [phone]

## 2023-12-28 NOTE — TELEPHONE ENCOUNTER
Forms faxed to [phone].      BARBARA Paulson  Rheumatology/Infectious disease  [facility]   Rheumatology ph:[phone]  Infectious Disease ph:[phone]

## 2024-01-03 NOTE — TELEPHONE ENCOUNTER
"Forms missing signatures. Forms completed with appropriate signatures and faxed to [email or web address] at [phone]. Fax confirmed by [employer]. Forms placed in \"recently faxed\" folder between RN desk.     KERLINE SalasN, RN, PHN  Medical Specialty Care Coordinator  [facility]    "

## 2024-01-04 ENCOUNTER — VIRTUAL VISIT (OUTPATIENT)
Dept: PSYCHOLOGY | Facility: CLINIC | Age: 59
End: 2024-01-04
Payer: COMMERCIAL

## 2024-01-04 DIAGNOSIS — F41.1 GENERALIZED ANXIETY DISORDER: ICD-10-CM

## 2024-01-04 DIAGNOSIS — F33.1 MAJOR DEPRESSIVE DISORDER, RECURRENT EPISODE, MODERATE (H): Primary | ICD-10-CM

## 2024-01-04 DIAGNOSIS — F41.0 PANIC DISORDER WITHOUT AGORAPHOBIA: ICD-10-CM

## 2024-01-04 PROCEDURE — 90834 PSYTX W PT 45 MINUTES: CPT | Mod: 95 | Performed by: PSYCHOLOGIST

## 2024-01-04 NOTE — TELEPHONE ENCOUNTER
Spoke with patient and wife they will ty to get the patient forms done     BONG Pressley, [facility]  Specialty Pharmacy Clinic Liaison     ealth [facility] Specialty    [email or web address]     Phone: [phone]  Fax: [phone]         Price Per Unit Or Per Cc In $ (Use Numbers Only, No Special Characters Or $): 4

## 2024-01-04 NOTE — PROGRESS NOTES
"[facility]                                     Progress Note    Patient Name: Jailene Breen  Date: 1/4/24         Service Type: Individual      Session Start Time: 11:00am Session End Time: 11:52am     Session Length: 52 minutes    Session #: 7    Attendees: Client attended alone    Service Modality:  Video Visit:      Provider verified identity through the following two step process.  Patient provided:  Patient photo    Telemedicine Visit: The patient's condition can be safely assessed and treated via synchronous audio and visual telemedicine encounter.      Reason for Telemedicine Visit: Services only offered telehealth    Originating Site (Patient Location): Patient's home    Distant Site (Provider Location): Provider Remote Setting- Home Office    Consent:  The patient/guardian has verbally consented to: the potential risks and benefits of telemedicine (video visit) versus in person care; bill my insurance or make self-payment for services provided; and responsibility for payment of non-covered services.     Patient would like the video invitation sent by:  Send to e-mail at: [email or web address]    Mode of Communication:  Video Conference via [facility]    Distant Location (Provider):  On-site    As the provider I attest to compliance with applicable laws and regulations related to telemedicine.    DATA  Interactive Complexity: No  Crisis: No        Progress Since Last Session (Related to Symptoms / Goals / Homework):   Symptoms: No change : Stable - Depression still present.    Homework: Partially completed      Episode of Care Goals: Satisfactory progress - ACTION (Actively working towards change); Intervened by reinforcing change plan / affirming steps taken     Current / Ongoing Stressors and Concerns:   Client experiencing possible panic attacks.    Notes from 1/4/24 Session:  Discussed holidays, \"Glad there over but they went so quick.  Seems like my son was here and gone.\"    Cl " "noticed situations in which his \"knees starting shaking and all that.  Maybe Yaya Tiesha.  I was at a family gathering.  It wasn't as severe.  I was taking to my biological father at the time and telling him about the panic attacks.\"  \"I don't know why I'm having them again.\"  Knees shake, started stuttering a lot, went on for about 30 minutes, nowhere near as severe as when he was hospitalized.  \"My son was over and I had another one.  It was definitely before the [employer] party.  I started getting that anxious feeling again.  That one was a little more severe than at the [employer] party.  Just my wife and son were here.  I've had panic attacks and this is nothing like the panic attacks I used to have, if that's what it is.\"    Used self-soothe talking, some grounding techniques, I forced myself to sit down and allowed myself to experience the feelings.  \"When your going through it you feel like you're going to die.\"    Enc cl to use his past experiences with these sensations to help calm himself (\"I've been through this before and it has always come and gone\").   \"It always hits me out of the blue.\"    \"The last two times it's happened I feel I was able to beat it and that feels really good.\"    If blindsided by the symptoms, \"I make it worse.\"  How can you help yourself/:  Helped identify unhelpful thoughts/behaviors:  \"I'm going to die.  I'm having a heart-attack,\"  Helpful thoughts, \"I'm going to be ok.  It's probably a panic attack and I'll be ok.\"    First symptoms:  Maybe stuttering more, adrenaline feeling from the gut to the head, stand up and get light-headed, hard to talk, hearts racing a little bit, hard to take a deep breath.\"    Past panic attacks were when he was drinking.    \"I feel like my memory is crap.\"    HW: \"I haven't really gone for a walk the last week or week and a half.\"       Treatment Objective(s) Addressed in This Session:   Discussed and explored client's panic attacks, looked for " triggers, helped identify first signs, skills used, or to use, to manage.  Discussed holidays; Reviewed and assigned homework; Highlighted benefits of going for a walk.       Intervention:   CBT: Pattern recognition; Socratic Questioning; Review and assigned homework; Problem solve; Highlighted Pros of going for a walk (increase motivation); Reviewed and identified Grounding Technique and Dissociating; Educated on physiology of Panic Attacks; Deep breathing..  Active listening, empathy, validation, and other rapport building skills.    Assessments completed prior to visit:  The following assessments were completed by patient for this visit:  None.     ASSESSMENT: Current Emotional / Mental Status (status of significant symptoms):   Risk status (Self / Other harm or suicidal ideation)   Patient denies current fears or concerns for personal safety.   Patient denies current or recent suicidal ideation or behaviors.   Patient denies current or recent homicidal ideation or behaviors.   Patient denies current or recent self injurious behavior or ideation.   Patient denies other safety concerns.   Patient reports there has been no change in risk factors since their last session.     Patient reports there has been no change in protective factors since their last session.     Recommended that patient call 911 or go to the local ED should there be a change in any of these risk factors.     Appearance:   Appropriate    Eye Contact:   Fair    Psychomotor Behavior: Normal    Attitude:   Cooperative  Pleasant   Orientation:   All   Speech    Rate / Production: Normal/ Responsive    Volume:  Normal    Mood:    Normal Sad    Affect:    Appropriate    Thought Content:  Clear    Thought Form:  Coherent  Logical    Insight:    Good      Medication Review:   No changes to current psychiatric medication(s)     Medication Compliance:   Yes     Changes in Health Issues:   None reported     Chemical Use Review:   Substance Use: Chemical  "use reviewed, no active concerns identified      Tobacco Use: Did not address this session.    Diagnosis:  1. Major Depressive Disorder, Recurrent, Moderate, With melancholic features (H)    2. Generalized Anxiety Disorder    3. Panic Disorder        Collateral Reports Completed:   Not Applicable    PLAN: (Patient Tasks / Therapist Tasks / Other)  The client will get out and walk his dog daily (as often as possible) in order to start to manage depression.  Client will practice deep breathing techniques to use when having a panic attack.  He will be mindful of first signs of a panic attack, and use other skills discussed this session to manage panic attack.  He made a follow-up appointment for January 18, 2024.      Haim Quezada, PsyD                                                    ______________________________________________________________________    Individual Treatment Plan    Patient's Name: Jailene Breen  YOB: 1965    Date of Creation: 12/11/23  Date Treatment Plan Last Reviewed/Revised: 12/11/23    DSM5 Diagnoses: 296.32 (F33.1) Major Depressive Disorder, Recurrent Episode, Moderate With melancholic features or 300.01 (F41.0) Panic Disorder  300.02 (F41.1) Generalized Anxiety Disorder  Psychosocial / Contextual Factors: Client is on disability and does not work; Multiple health concerns.  PROMIS (reviewed every 90 days): 15 (on 10/24/23).    Referral / Collaboration:  Referral to another professional/service is not indicated at this time..    Anticipated number of session for this episode of care:  18-20  Anticipation frequency of session: Every other week  Anticipated Duration of each session: 38-52 minutes  Treatment plan will be reviewed in 90 days or when goals have been changed.       MeasurableTreatment Goal(s) related to diagnosis / functional impairment(s)  Goal 1: Patient wants \"to get my life back.  Not let this depression take control.  Be productive.\"    I will know " "I've met my goal when TBD.      Objective #A \"I'm just trying to find that motivation to get up.\"    Patient will Increase interest, engagement, and pleasure in doing things  Decrease frequency and intensity of feeling down, depressed, hopeless.  Status: New - Date: 12/11/23      Intervention(s)  Therapist will teach  skills to manage depression and assign homework as appropriate .    Objective #B  TBD (if needed)  Patient will  TBD .  Status:  TBD      Intervention(s)  Therapist will  TBD .      Patient has not reviewed nor agreed to the above plan.        Haim Quezada PsyD  January 4, 2024  "

## 2024-01-08 NOTE — TELEPHONE ENCOUNTER
Faxed pt forms to [facility]   [phone]      BONG Pressley, [facility]  Specialty Pharmacy Clinic Liaison     ealth [facility]    [email or web address]     Phone: [phone]  Fax: [phone]

## 2024-01-09 NOTE — PROGRESS NOTES
I am seeing this patient in consultation for ringing in ears at the request of the provider Raghu Pineda .    Chief Complaint - Tinnitus    History of Present Illness - Jailene Breen is a 58 year old male who presents to me today with ringing in the left.  It has been present and noticeable for approximately 10-12 years.  It was sudden in onset when listening to loud music in car. It bothers him a lot. It does get louder at times. He notices worse hearing. There is a history of head trauma a long time ago. No chronic ear disease or ear surgery. 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 No regular use of aspirin or NSAIDS. I personally reviewed the relevant clinical notes in Epic including the primary care providers note.     Tests personally reviewed today for this visit:   1.) audiogram was performed today as part of the evaluation and personally reviewed. The audiogram showed a significant mostly symmetric high frequency sensorineural hearing loss (left ear slightly worse in a 3 tones).    2.) tympanograms were performed today and were normal Type A curves, with normal canal volume and middle ear pressure.    3.) MRI brain ordered today    Past Medical History -   Patient Active Problem List   Diagnosis    CARDIOVASCULAR SCREENING; LDL GOAL LESS THAN 160    Obesity, Class I, BMI 30-34.9    Tear meniscus knee, right, initial encounter    Rheumatoid arthritis involving multiple sites, unspecified rheumatoid factor presence    Chronic pain    Right-sided thoracic back pain, unspecified chronicity    Generalized anxiety disorder    Panic attack    Ankylosing spondylitis (H)    Moderate major depression (H)    Immunocompromised (H24)    Essential hypertension with goal blood pressure less than 140/90    Suicidal ideation    Major depressive disorder, recurrent episode, severe with mixed features (H)    Chronic back pain greater than 3 months  duration    Contact dermatitis and eczema    Dermatitis    Drug dependence (H)    Encounter for screening colonoscopy    Esophageal reflux    Hematuria    Hyperlipidemia    Lumbar radiculopathy    Plantar fascial fibromatosis    Sprain of sacroiliac region    Overweight    Nonintractable migraine    Narcotic abuse, continuous (H)    Morbid obesity (H)       Current Medications -   Current Outpatient Medications:     atorvastatin (LIPITOR) 20 MG tablet, TAKE 1 TABLET BY MOUTH DAILY, Disp: 90 tablet, Rfl: 0    buprenorphine (SUBUTEX) 8 MG SUBL sublingual tablet, Place 1 tablet (8 mg) under the tongue 3 times daily Fill 11/30. Start December 2, 2023. 30 day script., Disp: 90 tablet, Rfl: 0    FLUoxetine (PROZAC) 40 MG capsule, Take 2 capsules (80 mg) by mouth daily, Disp: 180 capsule, Rfl: 0    folic acid (FOLVITE) 1 MG tablet, Take 3 tablets (3,000 mcg) by mouth daily, Disp: 300 tablet, Rfl: 2    medical cannabis (Patient's own supply), See Admin Instructions (The purpose of this order is to document that the patient reports taking medical cannabis.  This is not a prescription, and is not used to certify that the patient has a qualifying medical condition.)   Pills PRN  Lozenges PRN, Disp: , Rfl:     melatonin 3 MG tablet, Take 1 tablet (3 mg) by mouth every evening . Take 3-4 hours before your desired bedtime., Disp: 90 tablet, Rfl: 0    methotrexate 2.5 MG tablet, Take 8 tablets (20 mg) by mouth every 7 days, Disp: 96 tablet, Rfl: 0    naloxone (NARCAN) 4 MG/0.1ML nasal spray, Spray 1 spray (4 mg) into one nostril alternating nostrils as needed for opioid reversal every 2-3 minutes until assistance arrives, Disp: 0.2 mL, Rfl: 1    oxyCODONE (ROXICODONE) 5 MG tablet, Take 1 tablet (5 mg) by mouth every 6 hours as needed for severe pain Max of 3/day. Fill/begin November 15, 2023 Short term script for acute pain flare and COVID infection, off of his anti-rheum meds, Disp: 42 tablet, Rfl: 0    prazosin (MINIPRESS) 2 MG  "capsule, Take 1 capsule (2 mg) by mouth at bedtime, Disp: 90 capsule, Rfl: 0    QUEtiapine 150 MG TABS, Take 150 mg by mouth at bedtime, Disp: 90 tablet, Rfl: 0    tiZANidine (ZANAFLEX) 4 MG tablet, Take 1 tablet (4 mg) by mouth 3 times daily as needed for muscle spasms, Disp: 75 tablet, Rfl: 2    tiZANidine (ZANAFLEX) 4 MG tablet, Take 1 tablet (4 mg) by mouth 3 times daily as needed for muscle spasms, Disp: 45 tablet, Rfl: 1    tofacitinib (XELJANZ XR) 11 MG 24 hr tablet, Take 1 tablet (11 mg) by mouth daily Hold for signs of infection, then seek medical attention. (Patient not taking: Reported on 8/18/2023), Disp: 30 tablet, Rfl: 5    Current Facility-Administered Medications:     fentaNYL (PF) (SUBLIMAZE) injection 12.5-75 mcg, 12.5-75 mcg, Intravenous, Q5 Min PRN, Aguilar Holley MD, 25 mcg at 07/20/23 1046    midazolam (VERSED) injection 0.5-4 mg, 0.5-4 mg, Intravenous, Q5 Min PRN, Aguilar Holley MD, 1 mg at 07/20/23 1040    Allergies -   Allergies   Allergen Reactions    Mirtazapine      Other reaction(s): Coma    Hydrocodone Itching    Mirtazapine Other (See Comments)     \"Blacked out\" for one week    Ms Contin [Morphine] Itching       Social History -   Social History     Socioeconomic History    Marital status:     Number of children: 2   Tobacco Use    Smoking status: Never    Smokeless tobacco: Former     Types: Chew     Quit date: 12/12/2019    Tobacco comments:     still chews nicotine gum   Vaping Use    Vaping Use: Never used   Substance and Sexual Activity    Alcohol use: No     Comment: none    Drug use: Yes     Frequency: 7.0 times per week     Types: Marijuana     Comment: medicinal (oral)    Sexual activity: Yes     Partners: Female     Comment: decreased with Prozac       Family History -   Family History   Problem Relation Age of Onset    Hypertension Mother     Diabetes Mother     Depression Mother     Mental Illness Mother     Coronary Stenting Father 62        " possible MI    Lung Cancer Paternal Uncle     Substance Abuse Brother     Substance Abuse Brother     Autoimmune Disease No family hx of     Anesthesia Reaction No family hx of     Thrombophilia No family hx of     Bleeding Disorder No family hx of        Review of Systems - As per HPI and PMHx, otherwise 10+ system review is negative.    Physical Exam  General - The patient is in no distress. Alert and oriented to person and place, answers questions and cooperates with examination appropriately.   Neurologic - CN II-XII are grossly intact. No focal neurologic deficits.   Voice and Breathing - The patient was breathing comfortably without the use of accessory muscles. There was no wheezing, stridor, or stertor.  The patients voice was clear and strong.  Ears - clean canals. The tympanic membranes are normal in appearance, bony landmarks are intact.  No retraction, perforation, or masses. No fluid or purulence was seen in the external canal or the middle ear. No evidence of infection of the middle ear or external canal, cerumen was normal in appearance.  Eyes - Extraocular movements intact, and the pupils were reactive to light.  Sclera were not icteric or injected, conjunctiva were pink and moist.   Neck - Soft, non-tender. Palpation of the occipital, submental, submandibular, internal jugular chain, and supraclavicular nodes did not demonstrate any abnormal lymph nodes or masses. No parotid masses. Palpation of the thyroid was soft and smooth, with no nodules or goiter appreciated.  The trachea was mobile and midline.  Cardiovascular - carotid pulses are 2+ bilaterally, regular rhythm      Assessment and Plan - Jailenerad Breen is a 58 year old male who presents to me today with left tinnitus, and asymmetric sensorineural hearing loss. I recommend MRI brain to rule-out retrocochlear pathology. He is bothered by this a lot. I recommend he see the tinnitus and hyperacusis clinic for tinnitus retraining therapy.      Discussed were steps that can be taken to mask the noise, such as a low volume de-tuned radio, a fan in the background, and/or hearing aids.  Correlation with stress, anxiety, and depression were also discussed.  The patient was also cautioned on the numerous expensive non-pharmaceutical options that are advertised, and have no proven benefit.    The patient will follow up as necessary for worsening symptoms or changes in symptoms. I have also recommended yearly audiograms, and consideration of a hearing aid evaluation.      Daniel Plasencia MD  Otolaryngology  [facility]

## 2024-01-10 NOTE — TELEPHONE ENCOUNTER
Spoke to pap pending BI with them call back in 1 week      BONG Pressley, [facility]  Specialty Pharmacy Clinic Liaison     ealth [facility] Specialty    [email or web address]     Phone: [phone]  Fax: [phone]

## 2024-01-10 NOTE — TELEPHONE ENCOUNTER
FREE DRUG APPLICATION APPROVED    Medication:  Xeljanz  Program Name:  Xelsource  Effective Date: 1/10/2024  Expiration Date: 12/31/2024  Pharmacy Filling the Rx: [place] [id number] [facility] [id number]  Patient Notified: yes    Additional Information:           BONG Pressley, [facility]  Specialty Pharmacy Clinic [facility]    [email or web address]     Phone: [phone]  Fax: [phone]

## 2024-01-11 ENCOUNTER — LAB (OUTPATIENT)
Dept: LAB | Facility: CLINIC | Age: 59
End: 2024-01-11
Payer: COMMERCIAL

## 2024-01-11 ENCOUNTER — OFFICE VISIT (OUTPATIENT)
Dept: AUDIOLOGY | Facility: CLINIC | Age: 59
End: 2024-01-11
Attending: STUDENT IN AN ORGANIZED HEALTH CARE EDUCATION/TRAINING PROGRAM
Payer: COMMERCIAL

## 2024-01-11 ENCOUNTER — OFFICE VISIT (OUTPATIENT)
Dept: OTOLARYNGOLOGY | Facility: CLINIC | Age: 59
End: 2024-01-11
Attending: STUDENT IN AN ORGANIZED HEALTH CARE EDUCATION/TRAINING PROGRAM
Payer: COMMERCIAL

## 2024-01-11 VITALS
DIASTOLIC BLOOD PRESSURE: 75 MMHG | SYSTOLIC BLOOD PRESSURE: 117 MMHG | OXYGEN SATURATION: 94 % | HEART RATE: 79 BPM | RESPIRATION RATE: 18 BRPM

## 2024-01-11 DIAGNOSIS — F41.1 GENERALIZED ANXIETY DISORDER WITH PANIC ATTACKS: ICD-10-CM

## 2024-01-11 DIAGNOSIS — H93.13 RINGING IN EAR, BILATERAL: ICD-10-CM

## 2024-01-11 DIAGNOSIS — H90.3 SENSORINEURAL HEARING LOSS, BILATERAL: Primary | ICD-10-CM

## 2024-01-11 DIAGNOSIS — H93.12 TINNITUS, LEFT: ICD-10-CM

## 2024-01-11 DIAGNOSIS — F41.0 GENERALIZED ANXIETY DISORDER WITH PANIC ATTACKS: ICD-10-CM

## 2024-01-11 DIAGNOSIS — H90.3 SNHL (SENSORY-NEURAL HEARING LOSS), ASYMMETRICAL: ICD-10-CM

## 2024-01-11 DIAGNOSIS — F33.1 MAJOR DEPRESSIVE DISORDER, RECURRENT EPISODE, MODERATE (H): ICD-10-CM

## 2024-01-11 DIAGNOSIS — Z79.899 ENCOUNTER FOR LONG-TERM (CURRENT) USE OF MEDICATIONS: ICD-10-CM

## 2024-01-11 DIAGNOSIS — H93.12 TINNITUS, LEFT: Primary | ICD-10-CM

## 2024-01-11 DIAGNOSIS — H93.12 TINNITUS OF LEFT EAR: ICD-10-CM

## 2024-01-11 DIAGNOSIS — F32.A DEPRESSION, UNSPECIFIED DEPRESSION TYPE: ICD-10-CM

## 2024-01-11 DIAGNOSIS — M06.9 RHEUMATOID ARTHRITIS INVOLVING MULTIPLE SITES, UNSPECIFIED WHETHER RHEUMATOID FACTOR PRESENT (H): ICD-10-CM

## 2024-01-11 LAB
ERYTHROCYTE [DISTWIDTH] IN BLOOD BY AUTOMATED COUNT: 13 % (ref 10–15)
ERYTHROCYTE [SEDIMENTATION RATE] IN BLOOD BY WESTERGREN METHOD: 12 MM/HR (ref 0–20)
HBA1C MFR BLD: 5.5 % (ref 0–5.6)
HCT VFR BLD AUTO: 42.7 % (ref 40–53)
HGB BLD-MCNC: 14.5 G/DL (ref 13.3–17.7)
MCH RBC QN AUTO: 30.3 PG (ref 26.5–33)
MCHC RBC AUTO-ENTMCNC: 34 G/DL (ref 31.5–36.5)
MCV RBC AUTO: 89 FL (ref 78–100)
PLATELET # BLD AUTO: 214 10E3/UL (ref 150–450)
RBC # BLD AUTO: 4.78 10E6/UL (ref 4.4–5.9)
WBC # BLD AUTO: 6.9 10E3/UL (ref 4–11)

## 2024-01-11 PROCEDURE — 85652 RBC SED RATE AUTOMATED: CPT

## 2024-01-11 PROCEDURE — 80061 LIPID PANEL: CPT

## 2024-01-11 PROCEDURE — 82040 ASSAY OF SERUM ALBUMIN: CPT

## 2024-01-11 PROCEDURE — 82565 ASSAY OF CREATININE: CPT

## 2024-01-11 PROCEDURE — 36415 COLL VENOUS BLD VENIPUNCTURE: CPT

## 2024-01-11 PROCEDURE — 85027 COMPLETE CBC AUTOMATED: CPT

## 2024-01-11 PROCEDURE — 84460 ALANINE AMINO (ALT) (SGPT): CPT

## 2024-01-11 PROCEDURE — 83036 HEMOGLOBIN GLYCOSYLATED A1C: CPT

## 2024-01-11 PROCEDURE — 84450 TRANSFERASE (AST) (SGOT): CPT

## 2024-01-11 PROCEDURE — 92557 COMPREHENSIVE HEARING TEST: CPT | Performed by: AUDIOLOGIST

## 2024-01-11 PROCEDURE — 99204 OFFICE O/P NEW MOD 45 MIN: CPT | Performed by: OTOLARYNGOLOGY

## 2024-01-11 PROCEDURE — 86140 C-REACTIVE PROTEIN: CPT

## 2024-01-11 PROCEDURE — 84443 ASSAY THYROID STIM HORMONE: CPT

## 2024-01-11 PROCEDURE — 92567 TYMPANOMETRY: CPT | Performed by: AUDIOLOGIST

## 2024-01-11 NOTE — LETTER
1/11/2024         RE: Jailene Breen  [address]   [place] [address]        Dear Colleague,    Thank you for referring your patient, Jailene Breen, to the [facility]. Please see a copy of my visit note below.    I am seeing this patient in consultation for ringing in ears at the request of the provider Raghu Pineda .    Chief Complaint - Tinnitus    History of Present Illness - Jailene Breen is a 58 year old male who presents to me today with ringing in the left.  It has been present and noticeable for approximately 10-12 years.  It was sudden in onset when listening to loud music in car. It bothers him a lot. It does get louder at times. He notices worse hearing. There is a history of head trauma a long time ago. No chronic ear disease or ear surgery. 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 No regular use of aspirin or NSAIDS. I personally reviewed the relevant clinical notes in Epic including the primary care providers note.     Tests personally reviewed today for this visit:   1.) audiogram was performed today as part of the evaluation and personally reviewed. The audiogram showed a significant mostly symmetric high frequency sensorineural hearing loss (left ear slightly worse in a 3 tones).    2.) tympanograms were performed today and were normal Type A curves, with normal canal volume and middle ear pressure.    3.) MRI brain ordered today    Past Medical History -   Patient Active Problem List   Diagnosis     CARDIOVASCULAR SCREENING; LDL GOAL LESS THAN 160     Obesity, Class I, BMI 30-34.9     Tear meniscus knee, right, initial encounter     Rheumatoid arthritis involving multiple sites, unspecified rheumatoid factor presence     Chronic pain     Right-sided thoracic back pain, unspecified chronicity     Generalized anxiety disorder     Panic attack     Ankylosing spondylitis (H)     Moderate  major depression (H)     Immunocompromised (H24)     Essential hypertension with goal blood pressure less than 140/90     Suicidal ideation     Major depressive disorder, recurrent episode, severe with mixed features (H)     Chronic back pain greater than 3 months duration     Contact dermatitis and eczema     Dermatitis     Drug dependence (H)     Encounter for screening colonoscopy     Esophageal reflux     Hematuria     Hyperlipidemia     Lumbar radiculopathy     Plantar fascial fibromatosis     Sprain of sacroiliac region     Overweight     Nonintractable migraine     Narcotic abuse, continuous (H)     Morbid obesity (H)       Current Medications -   Current Outpatient Medications:      atorvastatin (LIPITOR) 20 MG tablet, TAKE 1 TABLET BY MOUTH DAILY, Disp: 90 tablet, Rfl: 0     buprenorphine (SUBUTEX) 8 MG SUBL sublingual tablet, Place 1 tablet (8 mg) under the tongue 3 times daily Fill 11/30. Start December 2, 2023. 30 day script., Disp: 90 tablet, Rfl: 0     FLUoxetine (PROZAC) 40 MG capsule, Take 2 capsules (80 mg) by mouth daily, Disp: 180 capsule, Rfl: 0     folic acid (FOLVITE) 1 MG tablet, Take 3 tablets (3,000 mcg) by mouth daily, Disp: 300 tablet, Rfl: 2     medical cannabis (Patient's own supply), See Admin Instructions (The purpose of this order is to document that the patient reports taking medical cannabis.  This is not a prescription, and is not used to certify that the patient has a qualifying medical condition.)   Pills PRN  Lozenges PRN, Disp: , Rfl:      melatonin 3 MG tablet, Take 1 tablet (3 mg) by mouth every evening . Take 3-4 hours before your desired bedtime., Disp: 90 tablet, Rfl: 0     methotrexate 2.5 MG tablet, Take 8 tablets (20 mg) by mouth every 7 days, Disp: 96 tablet, Rfl: 0     naloxone (NARCAN) 4 MG/0.1ML nasal spray, Spray 1 spray (4 mg) into one nostril alternating nostrils as needed for opioid reversal every 2-3 minutes until assistance arrives, Disp: 0.2 mL, Rfl: 1      "oxyCODONE (ROXICODONE) 5 MG tablet, Take 1 tablet (5 mg) by mouth every 6 hours as needed for severe pain Max of 3/day. Fill/begin November 15, 2023 Short term script for acute pain flare and COVID infection, off of his anti-rheum meds, Disp: 42 tablet, Rfl: 0     prazosin (MINIPRESS) 2 MG capsule, Take 1 capsule (2 mg) by mouth at bedtime, Disp: 90 capsule, Rfl: 0     QUEtiapine 150 MG TABS, Take 150 mg by mouth at bedtime, Disp: 90 tablet, Rfl: 0     tiZANidine (ZANAFLEX) 4 MG tablet, Take 1 tablet (4 mg) by mouth 3 times daily as needed for muscle spasms, Disp: 75 tablet, Rfl: 2     tiZANidine (ZANAFLEX) 4 MG tablet, Take 1 tablet (4 mg) by mouth 3 times daily as needed for muscle spasms, Disp: 45 tablet, Rfl: 1     tofacitinib (XELJANZ XR) 11 MG 24 hr tablet, Take 1 tablet (11 mg) by mouth daily Hold for signs of infection, then seek medical attention. (Patient not taking: Reported on 8/18/2023), Disp: 30 tablet, Rfl: 5    Current Facility-Administered Medications:      fentaNYL (PF) (SUBLIMAZE) injection 12.5-75 mcg, 12.5-75 mcg, Intravenous, Q5 Min PRN, Aguilar Holley MD, 25 mcg at 07/20/23 1046     midazolam (VERSED) injection 0.5-4 mg, 0.5-4 mg, Intravenous, Q5 Min PRN, Aguilar Holley MD, 1 mg at 07/20/23 1040    Allergies -   Allergies   Allergen Reactions     Mirtazapine      Other reaction(s): Coma     Hydrocodone Itching     Mirtazapine Other (See Comments)     \"Blacked out\" for one week     Ms Contin [Morphine] Itching       Social History -   Social History     Socioeconomic History     Marital status:      Number of children: 2   Tobacco Use     Smoking status: Never     Smokeless tobacco: Former     Types: Chew     Quit date: 12/12/2019     Tobacco comments:     still chews nicotine gum   Vaping Use     Vaping Use: Never used   Substance and Sexual Activity     Alcohol use: No     Comment: none     Drug use: Yes     Frequency: 7.0 times per week     Types: Marijuana     " Comment: medicinal (oral)     Sexual activity: Yes     Partners: Female     Comment: decreased with Prozac       Family History -   Family History   Problem Relation Age of Onset     Hypertension Mother      Diabetes Mother      Depression Mother      Mental Illness Mother      Coronary Stenting Father 62        possible MI     Lung Cancer Paternal Uncle      Substance Abuse Brother      Substance Abuse Brother      Autoimmune Disease No family hx of      Anesthesia Reaction No family hx of      Thrombophilia No family hx of      Bleeding Disorder No family hx of        Review of Systems - As per HPI and PMHx, otherwise 10+ system review is negative.    Physical Exam  General - The patient is in no distress. Alert and oriented to person and place, answers questions and cooperates with examination appropriately.   Neurologic - CN II-XII are grossly intact. No focal neurologic deficits.   Voice and Breathing - The patient was breathing comfortably without the use of accessory muscles. There was no wheezing, stridor, or stertor.  The patients voice was clear and strong.  Ears - clean canals. The tympanic membranes are normal in appearance, bony landmarks are intact.  No retraction, perforation, or masses. No fluid or purulence was seen in the external canal or the middle ear. No evidence of infection of the middle ear or external canal, cerumen was normal in appearance.  Eyes - Extraocular movements intact, and the pupils were reactive to light.  Sclera were not icteric or injected, conjunctiva were pink and moist.   Neck - Soft, non-tender. Palpation of the occipital, submental, submandibular, internal jugular chain, and supraclavicular nodes did not demonstrate any abnormal lymph nodes or masses. No parotid masses. Palpation of the thyroid was soft and smooth, with no nodules or goiter appreciated.  The trachea was mobile and midline.  Cardiovascular - carotid pulses are 2+ bilaterally, regular rhythm      Assessment  and June Fishmanstephenie Breen is a 58 year old male who presents to me today with left tinnitus, and asymmetric sensorineural hearing loss. I recommend MRI brain to rule-out retrocochlear pathology. He is bothered by this a lot. I recommend he see the tinnitus and hyperacusis clinic for tinnitus retraining therapy.     Discussed were steps that can be taken to mask the noise, such as a low volume de-tuned radio, a fan in the background, and/or hearing aids.  Correlation with stress, anxiety, and depression were also discussed.  The patient was also cautioned on the numerous expensive non-pharmaceutical options that are advertised, and have no proven benefit.    The patient will follow up as necessary for worsening symptoms or changes in symptoms. I have also recommended yearly audiograms, and consideration of a hearing aid evaluation.      Daniel Plasnecia MD  Otolaryngology  [facility]        Again, thank you for allowing me to participate in the care of your patient.        Sincerely,        Daniel Plasencia MD

## 2024-01-11 NOTE — PROGRESS NOTES
AUDIOLOGY REPORT:    Patient was referred from ENT by Dr. Plasencia for audiology evaluation. The patient reports that he has had constant bilateral tinnitus for over ten years. It varies in how noticeable it is and seems to be more prominent in the left ear. The tinnitus had a sudden onset. The patient also reports gradually worsening hearing. 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 The patient reports that he does not have ear pain, ear pressure, or a history of ear problems or ear surgery.    Testing:    Otoscopy:   Otoscopic exam indicates ears are clear of cerumen bilaterally     Tympanograms:    RIGHT: normal eardrum mobility     LEFT:   normal eardrum mobility  NOTE: data lost due to an equipment problem    Reflexes (reported by stimulus ear):  Attempted, but results were lost due to an equipment problem    Thresholds:   Pure Tone Thresholds assessed using conventional audiometry with good reliability from 250-8000 Hz bilaterally using insert earphones and circumaural headphones     RIGHT:  normal hearing sensitivity through 2000 Hz sloping to mild to moderately severe sensorineural hearing loss    LEFT:    normal hearing sensitivity through 2000 Hz sloping to mild to moderately severe sensorineural hearing loss    Speech Reception Threshold:    RIGHT: 20 dB HL    LEFT:   25 dB HL  Results are slightly higher than expected given the pure tone averages.     Word Recognition Score:     RIGHT: 100% at 60 dB HL using NU-6 recorded word list.    LEFT:   96% at 65 dB HL using NU-6 recorded word list.    Discussed results with the patient. Discussed basic tinnitus management strategies, such as using sound enrichment to facilitate habituation. The connections between hearing loss, tinnitus, and noise exposure were also discussed. Discussed the role of amplification in tinnitus management, and the patient was given a copy of his  audiogram.    Patient was returned to ENT for follow up.     Farideh Painting, CCC-A  Licensed Audiologist #[id number]  1/11/2024

## 2024-01-12 LAB
ALBUMIN SERPL BCG-MCNC: 4.3 G/DL (ref 3.5–5.2)
ALT SERPL W P-5'-P-CCNC: 49 U/L (ref 0–70)
AST SERPL W P-5'-P-CCNC: 40 U/L (ref 0–45)
CHOLEST SERPL-MCNC: 185 MG/DL
CREAT SERPL-MCNC: 1.01 MG/DL (ref 0.67–1.17)
CRP SERPL-MCNC: <3 MG/L
EGFRCR SERPLBLD CKD-EPI 2021: 87 ML/MIN/1.73M2
FASTING STATUS PATIENT QL REPORTED: YES
HDLC SERPL-MCNC: 41 MG/DL
LDLC SERPL CALC-MCNC: 131 MG/DL
NONHDLC SERPL-MCNC: 144 MG/DL
TRIGL SERPL-MCNC: 66 MG/DL
TSH SERPL DL<=0.005 MIU/L-ACNC: 2.54 UIU/ML (ref 0.3–4.2)

## 2024-01-14 DIAGNOSIS — E78.5 DYSLIPIDEMIA: ICD-10-CM

## 2024-01-15 RX ORDER — ATORVASTATIN CALCIUM 20 MG/1
20 TABLET, FILM COATED ORAL DAILY
Qty: 90 TABLET | Refills: 0 | Status: SHIPPED | OUTPATIENT
Start: 2024-01-15 | End: 2024-03-01

## 2024-01-16 ENCOUNTER — MYC MEDICAL ADVICE (OUTPATIENT)
Dept: PALLIATIVE MEDICINE | Facility: CLINIC | Age: 59
End: 2024-01-16
Payer: COMMERCIAL

## 2024-01-16 DIAGNOSIS — M51.369 DDD (DEGENERATIVE DISC DISEASE), LUMBAR: ICD-10-CM

## 2024-01-16 DIAGNOSIS — F11.20 UNCOMPLICATED OPIOID DEPENDENCE (H): ICD-10-CM

## 2024-01-16 DIAGNOSIS — M54.59 LUMBAR FACET JOINT PAIN: ICD-10-CM

## 2024-01-16 DIAGNOSIS — M25.50 MULTIPLE JOINT PAIN: ICD-10-CM

## 2024-01-16 DIAGNOSIS — M47.817 SPONDYLOSIS WITHOUT MYELOPATHY OR RADICULOPATHY, LUMBOSACRAL REGION: ICD-10-CM

## 2024-01-16 DIAGNOSIS — F11.90 CHRONIC, CONTINUOUS USE OF OPIOIDS: ICD-10-CM

## 2024-01-16 DIAGNOSIS — M45.7 ANKYLOSING SPONDYLITIS OF LUMBOSACRAL REGION (H): ICD-10-CM

## 2024-01-16 DIAGNOSIS — M05.79 RHEUMATOID ARTHRITIS INVOLVING MULTIPLE SITES WITH POSITIVE RHEUMATOID FACTOR (H): ICD-10-CM

## 2024-01-17 RX ORDER — BUPRENORPHINE 8 MG/1
8 TABLET SUBLINGUAL 3 TIMES DAILY
Qty: 90 TABLET | Refills: 0 | Status: CANCELLED | OUTPATIENT
Start: 2024-01-17

## 2024-01-17 NOTE — TELEPHONE ENCOUNTER
Please process a refill of Subutex 8 MG  to       [id number] - [place], [place] - [address] [facility] [address] [facility]  [address] [place] [address]  Phone: [phone] Fax: [phone]

## 2024-01-17 NOTE — TELEPHONE ENCOUNTER
Patient requesting refill(s) of buprenorphine (SUBUTEX) 8 MG SUBL sublingual tablet     Per pharmacy- Last dispensed from pharmacy on 12/15/23    Patient's last office/virtual visit by prescribing provider on 11/15/23  Next office/virtual appointment scheduled for 2/19/24    Last urine drug screen date 5/17/23  Current opioid agreement on file (completed within the last year) Yes Date of opioid agreement: 5/117/23    E-prescribe to   [id number] [place], [place]     Will route to nursing Dr[place] for review and preparation of prescription(s).

## 2024-01-18 NOTE — TELEPHONE ENCOUNTER
Medication refill information reviewed.     Due date for buprenorphine (SUBUTEX) 8 MG SUBL sublingual tablet   is 01/18/24     Prescriptions prepped for review.     Will route to provider.

## 2024-01-19 RX ORDER — BUPRENORPHINE 8 MG/1
8 TABLET SUBLINGUAL 3 TIMES DAILY
Qty: 90 TABLET | Refills: 0 | Status: SHIPPED | OUTPATIENT
Start: 2024-01-19 | End: 2024-02-19

## 2024-01-19 NOTE — TELEPHONE ENCOUNTER
Signed Prescriptions:                        Disp   Refills    buprenorphine (SUBUTEX) 8 MG SUBL sublingu*90 tab*0        Sig: Place 1 tablet (8 mg) under the tongue 3 times daily           Fill/start 01/19/24.  30 day script.  Authorizing Provider: SUSANA PETTY        Reviewed [place]  January 19, 2024- no concerning fills.    Susana GRANT, RN CNP, FNP  [facility] [place]

## 2024-01-28 ENCOUNTER — MYC MEDICAL ADVICE (OUTPATIENT)
Dept: PALLIATIVE MEDICINE | Facility: CLINIC | Age: 59
End: 2024-01-28
Payer: COMMERCIAL

## 2024-01-28 DIAGNOSIS — M05.79 RHEUMATOID ARTHRITIS INVOLVING MULTIPLE SITES WITH POSITIVE RHEUMATOID FACTOR (H): ICD-10-CM

## 2024-01-28 DIAGNOSIS — M25.50 MULTIPLE JOINT PAIN: ICD-10-CM

## 2024-01-28 DIAGNOSIS — F11.90 CHRONIC, CONTINUOUS USE OF OPIOIDS: ICD-10-CM

## 2024-01-28 DIAGNOSIS — M54.59 LUMBAR FACET JOINT PAIN: ICD-10-CM

## 2024-01-28 DIAGNOSIS — M45.7 ANKYLOSING SPONDYLITIS OF LUMBOSACRAL REGION (H): ICD-10-CM

## 2024-01-28 DIAGNOSIS — M51.369 DDD (DEGENERATIVE DISC DISEASE), LUMBAR: ICD-10-CM

## 2024-01-29 NOTE — TELEPHONE ENCOUNTER
Please process a refill of oxycodone 5 MG  to:    [facility] #[id number] - [place], [place] - [address] [facility]  [address]   [place] [address]  Phone: [phone] Fax: [phone]

## 2024-01-29 NOTE — TELEPHONE ENCOUNTER
Received call from patient requesting refill(s) of      oxyCODONE (ROXICODONE) 5 MG tablet   Last dispensed from pharmacy on 11/15/23    Patient's last office/virtual visit by prescribing provider on 11/15/23  Next office/virtual appointment scheduled for 2/19/24    Last urine drug screen date 5/17/23  Current opioid agreement on file Yes Date of opioid agreement: 5/17/23    E-prescribe to       [facility] #[id number] - [place], [place] - [address] [facility]    Will route to nursing [place] for review and preparation of prescription(s).

## 2024-01-30 RX ORDER — OXYCODONE HYDROCHLORIDE 5 MG/1
5 TABLET ORAL EVERY 6 HOURS PRN
Qty: 42 TABLET | Refills: 0 | Status: SHIPPED | OUTPATIENT
Start: 2024-01-30 | End: 2024-02-19

## 2024-01-30 NOTE — TELEPHONE ENCOUNTER
Medication refill information reviewed.     Due date for oxyCODONE (ROXICODONE) 5 MG tablet   is 01/30/24     Prescriptions prepped for review.     Will route to provider.

## 2024-01-31 NOTE — TELEPHONE ENCOUNTER
Signed Prescriptions:                        Disp   Refills    oxyCODONE (ROXICODONE) 5 MG tablet         42 tab*0        Sig: Take 1 tablet (5 mg) by mouth every 6 hours as needed           for severe pain Max of 3/day. Fill/begin January 30, 2024 Short term script for acute pain flare           and COVID infection, off of his anti-rheum meds  Authorizing Provider: SUSANA PETTY        Reviewed [place]  January 30, 2024- no concerning fills.    Susana Petty APRN, RN CNP, FNP  [facility]  [facility] [place]

## 2024-02-16 ENCOUNTER — MYC MEDICAL ADVICE (OUTPATIENT)
Dept: PSYCHIATRY | Facility: CLINIC | Age: 59
End: 2024-02-16
Payer: COMMERCIAL

## 2024-02-16 DIAGNOSIS — F33.1 MAJOR DEPRESSIVE DISORDER, RECURRENT EPISODE, MODERATE (H): ICD-10-CM

## 2024-02-16 DIAGNOSIS — F41.1 GENERALIZED ANXIETY DISORDER WITH PANIC ATTACKS: ICD-10-CM

## 2024-02-16 DIAGNOSIS — F41.0 GENERALIZED ANXIETY DISORDER WITH PANIC ATTACKS: ICD-10-CM

## 2024-02-16 RX ORDER — QUETIAPINE FUMARATE 150 MG/1
150 TABLET, FILM COATED ORAL AT BEDTIME
Qty: 90 TABLET | Refills: 0 | Status: SHIPPED | OUTPATIENT
Start: 2024-02-16 | End: 2024-04-09

## 2024-02-16 RX ORDER — QUETIAPINE FUMARATE 150 MG/1
150 TABLET, FILM COATED ORAL AT BEDTIME
Qty: 90 TABLET | Refills: 0 | Status: CANCELLED | OUTPATIENT
Start: 2024-02-16

## 2024-02-16 NOTE — TELEPHONE ENCOUNTER
Per my chart encounter, patient would like this to be send to [facility].  Writer also reminded patient to [place] schedule LUX Solomon on 2/16/2024 at 10:41 AM

## 2024-02-16 NOTE — TELEPHONE ENCOUNTER
Last Seen: 12-  RTC: 4 weeks   Cancel: none   No-Show: none   Next Appt: none     Incoming Refill From [facility]  via right fax     Medication Requested: QUEtiapine 150 MG TABS   Directions: Sig - Route: Take 150 mg by mouth at bedtime - Oral   Qty: 90   Last Refill: 12-    Medication Refill pended  Per Refill Protocol     My chart sent to patient if he is requesting medication to sent to [facility] and he is due for MFU.    Shila Solomon on 2/16/2024 at 9:52 AM

## 2024-02-19 ENCOUNTER — VIRTUAL VISIT (OUTPATIENT)
Dept: PALLIATIVE MEDICINE | Facility: CLINIC | Age: 59
End: 2024-02-19
Attending: NURSE PRACTITIONER
Payer: COMMERCIAL

## 2024-02-19 DIAGNOSIS — M62.838 MUSCLE SPASM: ICD-10-CM

## 2024-02-19 DIAGNOSIS — M45.7 ANKYLOSING SPONDYLITIS OF LUMBOSACRAL REGION (H): ICD-10-CM

## 2024-02-19 DIAGNOSIS — F11.90 CHRONIC, CONTINUOUS USE OF OPIOIDS: ICD-10-CM

## 2024-02-19 DIAGNOSIS — M05.79 RHEUMATOID ARTHRITIS INVOLVING MULTIPLE SITES WITH POSITIVE RHEUMATOID FACTOR (H): ICD-10-CM

## 2024-02-19 DIAGNOSIS — M79.18 MYOFASCIAL PAIN: ICD-10-CM

## 2024-02-19 DIAGNOSIS — M54.59 LUMBAR FACET JOINT PAIN: Primary | ICD-10-CM

## 2024-02-19 DIAGNOSIS — M25.50 MULTIPLE JOINT PAIN: ICD-10-CM

## 2024-02-19 DIAGNOSIS — M54.59 LUMBAR FACET JOINT PAIN: ICD-10-CM

## 2024-02-19 DIAGNOSIS — M51.369 DDD (DEGENERATIVE DISC DISEASE), LUMBAR: ICD-10-CM

## 2024-02-19 DIAGNOSIS — F11.20 UNCOMPLICATED OPIOID DEPENDENCE (H): ICD-10-CM

## 2024-02-19 DIAGNOSIS — M47.817 SPONDYLOSIS WITHOUT MYELOPATHY OR RADICULOPATHY, LUMBOSACRAL REGION: ICD-10-CM

## 2024-02-19 PROCEDURE — 99214 OFFICE O/P EST MOD 30 MIN: CPT | Mod: 95 | Performed by: NURSE PRACTITIONER

## 2024-02-19 PROCEDURE — G2211 COMPLEX E/M VISIT ADD ON: HCPCS | Performed by: NURSE PRACTITIONER

## 2024-02-19 RX ORDER — BUPRENORPHINE 8 MG/1
8 TABLET SUBLINGUAL 3 TIMES DAILY
Qty: 90 TABLET | Refills: 0 | Status: SHIPPED | OUTPATIENT
Start: 2024-02-19 | End: 2024-03-26

## 2024-02-19 RX ORDER — OXYCODONE HYDROCHLORIDE 5 MG/1
5 TABLET ORAL EVERY 6 HOURS PRN
Qty: 42 TABLET | Refills: 0 | Status: SHIPPED | OUTPATIENT
Start: 2024-02-19 | End: 2024-03-01

## 2024-02-19 RX ORDER — OXYCODONE HYDROCHLORIDE 5 MG/1
5 TABLET ORAL EVERY 6 HOURS PRN
Qty: 42 TABLET | Refills: 0 | Status: SHIPPED | OUTPATIENT
Start: 2024-02-19 | End: 2024-02-19

## 2024-02-19 ASSESSMENT — PAIN SCALES - PAIN ENJOYMENT GENERAL ACTIVITY SCALE (PEG)
INTERFERED_GENERAL_ACTIVITY: 7
AVG_PAIN_PASTWEEK: 8
INTERFERED_ENJOYMENT_LIFE: 7
PEG_TOTALSCORE: 7.33

## 2024-02-19 ASSESSMENT — PAIN SCALES - GENERAL: PAINLEVEL: EXTREME PAIN (8)

## 2024-02-19 NOTE — TELEPHONE ENCOUNTER
Please send to     [facility] #[id number] - [place], [place] - [address] AT [facility] & [facility]  [address] [place] [address]  Phone: [phone] Fax: [phone]

## 2024-02-19 NOTE — PATIENT INSTRUCTIONS
----------------------------------------------------------------  [facility] Number:  [phone]   Call with any questions about your care and for scheduling assistance.   Calls are returned Monday through Friday between 8 AM and 4:30 PM. We usually get back to you within 2 business days depending on the issue/request.    If we are prescribing your medications:  For opioid medication refills, call the clinic or send a [facility] message 7 days in advance.  Please include:  Name of requested medication  Name of the pharmacy.  For non-opioid medications, call your pharmacy directly to request a refill. Please allow 3-4 days to be processed.   Per [place] State Law:  All controlled substance prescriptions must be filled within 30 days of being written.    For those controlled substances allowing refills, pickup must occur within 30 days of last fill.      We believe regular attendance is key to your success in our program!    Any time you are unable to keep your appointment we ask that you call us at least 24 hours in advance to cancel.This will allow us to offer the appointment time to another patient.   Multiple missed appointments may lead to dismissal from the clinic.

## 2024-02-19 NOTE — PROGRESS NOTES
"Jamison is a 58 year old who is being evaluated via a billable video visit.      How would you like to obtain your AVS? Neurelishart  If the video visit is dropped, the [facility] should be resent [facility]. Cell phone: [phone]  Will anyone else be joining your video visit? No  Is Pt currently in [place]? Yes    Nicolasa Krueger MA      NOTE:  If Pt is not in [place], Appointment needs to be canceled and rescheduled.          Video-Visit Details    Type of service:  Video Visit   Video Start Time: 10:06 AM  Video End Time:10:27 AM    Originating Location (pt. Location): Home    Distant Location (provider location):  On-site  Platform used for Video Visit: [facility]      2/19/2024        Chief complaint:   -Bilateral low back pain right > Left. Denies radiation into the legs  -Hands, wrists, shoulders, elbows, and bilateral knees   -Joint pain is the most bothersome from RA  \"I hurt all over, everything hurts right now.\"       Interval history:   Jailene Breen is a 58 year old male is known to me for   Lumbar spondylosis, pain is worse with extension and rotation indicating a facetogenic component to pain  Lumbar degenerative disc disease  SI joint pain  Ankylosing spondylitis  Myofascial pain  Chronic pain syndrome  PMHx includes: Panic attacks, back pain, arthritis, anxiety, ankylosing spondylitis  PSHx includes: Right knee surgery ×2, left foot surgery right knee arthroscopy        Recommendations/plan at the last visit on 11/15/2023 included:  Check out Shmuel Murray online for gentle exercise  Physical Therapy:  Referral to PT  Clinical Health Psychologist: continue work with your psychiatrist and therapist  Diagnostic Studies:  None  Medication Management:    continue Subutex 8mg  three times per day starting today. Next script to be filled on 11/30 and start 12/2  Continue medical cannabis, re-cert today  continue tizanidine 2-4mg three times daily as " "needed for muscle spasms  Short term oxycodone script max of 3/day, fill and begin 11/15/2023. 7 day supply for acute on chronic pain in the setting of also having COVID  Further procedures recommended: none  Recommendations to PCP: see above   Follow up:12 weeks in-person/video. Please call [phone] to make your follow-up appointment with me.         Since his last visit, Jailene Breen reports:    Interval history February 19, 2024  -he has not been sleeping well.   -he has issues with nausea, he will feel hungry and then when he gets his food he will feel nausea and \"feel pukey.\"  -his wife had knee replacement surgery about 2 weeks ago.  -they have a dog that was recently fixed and he has been the one caring for the dog.   -he does use medical cannabis and he does feel that this is somewhat helpful for his nausea and helpful for pain.   -he is not on anything for reducing acid production in his gut, he states that his nausea and vomiting feels very acidic.   -he desires a repeat of lumbar RFA as his low back pain is worse, especially with lumbar extension and ext/rotation to the right and to the left.   -has been transitioned to Xeljanz but has not been able to take it regularly due to not feeling well.          Interval history November 15, 2023  -currently had COVID infection  -pain is worse right now as he cannot take his antirheumatological meds as he has COVID.   -pain is worse everywhere as he is sick and feverish and his joints are more painful.     Interval history August 9, 2023  -he is in an RA flare. He did not qualify for financial assistance for the Gogiro. He has an upcoming appt with his Rheumatologist next week.    -joint pain is the most bothersome.   -low back pain is pretty good right now  -has a new Telugu Page young dog and is more active with her.   -he had to put his older GSD down in early July, this was difficult.     Interval history May 17, 2023  -having a lot of pain, he is " "off of Rheumatological medication. Will be starting a new med but needs to sign up for financial assistance first. Med likely to be certolizumab pegol.  -discussed 2 cancelled appts in a row and a no show in April. Stressed importance of being seen at least quarterly while on opiate medication  -multiple joint pain and pain all over is the most bothersome   -he is off of Enbrel now        At this point, the patient's participation with our multidisciplinary team includes:  The patient has been compliant with the program.  PT - last PHYSICAL THERAPY with Asif Saldana on 12/23/2020  Health Psych - worked with  Padilla Keene, last on 11/6/2018.  Working with Jonna Farrell PhD and been seen >8 times. Last visit with Santa was on 9/2/2020         Pain scores:    Pain intensity on average is 7  on a scale of 0-10.    Range is 5-9/10.   Pain right now is 8/10.   Pain is described as \"sharp, throbbing, stabbing, burning, stiffness.\"  Pain is constant in nature      Current pain relevant medications:      -nortriptyline 50mg at bedtime (helpful)  -ibuprofen 800mg TID PRN (using 3 times daily-helpful)  -Tylenol 500mg using 1000mg TID (unsure if helpful)  -Maxalt 10mg PRN migraine (helpful for migraine--he does have visual aura)  -naloxone nasal spray PRN for accidental opiate overdose  -Subutex 8 mg TID (somewhat helpful)  -medical cannabis PRN (helpful)      Other pertinent medications:   -Fluoxetine 80mg every day (somewhat helpful, managed by psychiatry)  -quetiapine 150mg at bedtime  -Xeljanz 11mg every 24 hours for RA (he was able to start this short term but had to stop this again due to illness)      Previous Medications:   OPIATES: Oxycodone (helpful), Fentanyl (100mcg/hr patch helpful), hydrocodone (itching), Tramadol (not helpful), Suboxone (somewhat helpful)  NSAIDS: ibuprofen (not helpful), Aleve (not helpful)  MUSCLE RELAXANTS: methocarbamol (somewhat helpful), Flexeril (somewhat helpful but caused severe " urinary retention requiring catheterization), tizanidine (unsure if helpful), metaxalone (unsure), SOMA (helpful), chlorzoxazone (helped some)  ANTI-MIGRAINE MEDS: Maxalt (helpful for migraine), Imitrex injection (somewhat helpful)  ANTI-DEPRESSANTS: Prozac (helpful), Cymbalta (felt weird on it), nortriptyline (unsure if helpful), Buspar (unsure), Remeron (blacked out), bupropion (not helpful for smoking cessation)  SLEEP AIDS: Ambien (helpful)  ANTI-CONVULSANTS: gabapentin (felt odd on this medication, unsure of dose), Lyrica (not helpful for 4 months, unsure of dose), Topamax (not helpful, he felt weird on it)   TOPICALS: Lidocaine patches (not helpful), Voltaren gel (not helpful)  Other meds: Tylenol (unsure if helpful)        Other treatments have included:   Jailene Breen has been seen at a pain clinic in the past.  [facility]  PT: tried, not helpful  Chiropractic: tried, not helpful  Acupuncture: none  TENs Unit: tried, helpful         Injections:     -has had injections at outside clinics  -has had epidural injections for low back pain (one was helpful)  -he has had lumbar medial branch blocks at [facility] and he was going to have lumbar radiofrequency ablation (did not do this due to cost)  -4/3/2017 right LMBBs with Dr. Ashlyn Gamble (helpful)  -4/26/2017 right LMBBs with Dr. Ashlyn Gamble (helpful)  -5/31/2017 right L3, L4, L5 RFA with Dr. Ashlyn Gamble (good relief for over 6 months)  -3/15/2018 right L5 transforaminal MAKAYLA with Dr. Ashlyn Gamble (not helpful)  -5/10/2018 trigger point injections with Dr. Ashlyn Gamble(somewhat helpful).  -7/12/2018 Right L3, L4, L5 RFA with Dr. Ashlyn Gamble (helpful).  -9/20/2018 Left piriformis injections, bilateral gluteal trigger point with Dr. Gamble (helpful).  -1/31/2019 right L2-3, L3-4 and L4-5 facet joint injections with Dr. Ashlyn Gamble (somewhat helpful)  4/4/2019  right L3, L4, L5/sacral ala lumbar radiofrequency ablation with Dr. Gamble  "(helpful over right side)  6/28/2019 Bilateral facet joint injections at l4-5, L5-S1 with Dr. Holley (only helpful for 7 days)  -2/12/2020 right Q6-C1-P7-sacral ALA RFA done with Dr. Ashlyn Gamble (helpful)  -8/26/2020 left L3-5 lumbar medial branch blocks #1 with Dr. Ashlyn Gamble (very helpful)  -11/11/2020 left L3-5 lumbar medial branch blocks #2 with Dr. Ashlyn Gamble  (helpful)   -2/1/2021 bilateral lumbar RFA L4-5 and L5-S1 with Dr. Ashlyn Gamble   (this \"helped a little bit\" and then he tweaked his back about 2 weeks after it was done and it wasn't helpful)  -09/22/2022 lumbosacral RFA L4, L5 and sacral ala with Dr. Aguilar Holley (helpful,, at least 50% relief, he has a RA flare right now, so harder to tell)  -7/20/2023 bilateral lumbar RFA at L4, L5 and Sacral ala with Dr. Aguilar Holley (helpful, has reduced pain by at least 50-60% in the lower back)      THE 4 A's OF OPIOID MAINTENANCE ANALGESIA    Analgesia: Subutex is helpful    Activity: ADLs, caring for his wife who had knee replacement surgery and for his young Frisian Arcos Dog    Adverse effects: none    Adherence to Rx protocol: yes          Side Effects: no side effect   Patient is using the medication as prescribed: YES     Medications:  Current Outpatient Medications   Medication Sig Dispense Refill    atorvastatin (LIPITOR) 20 MG tablet TAKE 1 TABLET BY MOUTH ONCE  DAILY 90 tablet 0    buprenorphine (SUBUTEX) 8 MG SUBL sublingual tablet Place 1 tablet (8 mg) under the tongue 3 times daily Fill/start 01/19/24.  30 day script. 90 tablet 0    FLUoxetine (PROZAC) 40 MG capsule Take 2 capsules (80 mg) by mouth daily 180 capsule 0    folic acid (FOLVITE) 1 MG tablet Take 3 tablets (3,000 mcg) by mouth daily 300 tablet 2    medical cannabis (Patient's own supply) See Admin Instructions (The purpose of this order is to document that the patient reports taking medical cannabis.  This is not a prescription, and is not used to certify that the " patient has a qualifying medical condition.)     Pills PRN   Lozenges PRN      melatonin 3 MG tablet Take 1 tablet (3 mg) by mouth every evening . Take 3-4 hours before your desired bedtime. 90 tablet 0    methotrexate 2.5 MG tablet Take 8 tablets (20 mg) by mouth every 7 days 96 tablet 0    naloxone (NARCAN) 4 MG/0.1ML nasal spray Spray 1 spray (4 mg) into one nostril alternating nostrils as needed for opioid reversal every 2-3 minutes until assistance arrives 0.2 mL 1    oxyCODONE (ROXICODONE) 5 MG tablet Take 1 tablet (5 mg) by mouth every 6 hours as needed for severe pain Max of 3/day. Fill/begin January 30, 2024 Short term script for acute pain flare and COVID infection, off of his anti-rheum meds 42 tablet 0    prazosin (MINIPRESS) 2 MG capsule Take 1 capsule (2 mg) by mouth at bedtime 90 capsule 0    QUEtiapine Fumarate 150 MG TABS Take 150 mg by mouth at bedtime Please schedule an appointment for future refills. Call [phone] . 90 tablet 0    tiZANidine (ZANAFLEX) 4 MG tablet Take 1 tablet (4 mg) by mouth 3 times daily as needed for muscle spasms 75 tablet 2    tiZANidine (ZANAFLEX) 4 MG tablet Take 1 tablet (4 mg) by mouth 3 times daily as needed for muscle spasms 45 tablet 1    tofacitinib (XELJANZ XR) 11 MG 24 hr tablet Take 1 tablet (11 mg) by mouth daily Hold for signs of infection, then seek medical attention. (Patient not taking: Reported on 8/18/2023) 30 tablet 5       Medical History: any changes in medical history since they were last seen? No    Social History:    Home situation: , has 2 grown children  Occupation/Schooling: currently unemployed, worked as a  (unemployed since 3/1/2017). 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  Uses nicotine gum  If yes, was cessation  counseling offered?  None needed        Physical Exam:     Vital signs: There were no vitals taken for this visit.     Behavioral observations:  Awake, alert. Cooperative.   Pulm: respirations easy and unlabored. Able to speak in full sentences without SOB or cough noted.          [place] Prescription Monitoring [place]:  Reviewed [place]  2/19/2024- no concerning fills.  Susana GRANT RN CNP, FNP  [facility]  [facility] [place]          DIRE [place] for selecting candidates for long term opioid analgesia for chronic pain:  Diagnosis  2  Intractablility  2  Risk    Psychological health  1    Chemical health  2    Reliability  2    Social support  2  Efficacy  1  Total DIRE Score = 12. Note that  7-13 predicts poor outcome (compliance and efficacy) from opioid prescribing; 14-21 predicts good outcome (compliance and efficacy)  from opioid prescribing        Assessment:    Lumbar facet joint pain  Spondylosis of lumbar region without myelopathy or radiculopathy  Degenerative disc disease of the lumbar spine  Ankylosing spondylitis of lumbosacral region  Rheumatoid arthritis involving multiple joints with positive rheumatoid factor  Multiple joint pain  Muscle spasm  Myofascial pain  Uncomplicated opiate dependence  Chronic continuous use of opioids    Encounter for long-term use of opiate analgesic  UDT 5/17/2023  Signed CSA 5/17/2023  PMHx includes: Panic attacks, back pain, arthritis, anxiety, ankylosing spondylitis  PSHx includes: Right knee surgery ×2, left foot surgery right knee arthroscopy          Plan:    Check out Shmuel Murray online for gentle exercise  Physical Therapy:  Referral to PT  Clinical Health Psychologist: continue work with your psychiatrist and therapist  Diagnostic Studies:  None  Medication Management:    continue Subutex 8mg  three times per day starting today. Next script to be fill/start 2/19  Continue medical cannabis, re-cert today  continue tizanidine  2-4mg three times daily as needed for muscle spasms  Short term oxycodone script max of 3/day, fill and begin 2/19/2024. 7 day supply for acute on chronic pain in the setting of not feeling well and off of antirheumatological medications  Further procedures recommended: repeat lumbar RFA, our office will call you  Recommendations to PCP: see above   Follow up:12 weeks in-person. Please call [phone] to make your follow-up appointment with me.           ASSESSMENT AND PLAN:  (M54.59) Lumbar facet joint pain  (primary encounter diagnosis)  Comment:   Plan: buprenorphine (SUBUTEX) 8 MG SUBL sublingual         tablet, Adult Pain Clinic Follow-Up Order,          oxyCODONE (ROXICODONE) 5 MG         tablet, PAIN INJECTION         EVAL/TREAT/FOLLOW UP            (M47.817) Spondylosis without myelopathy or radiculopathy, lumbosacral region  Comment:   Plan: buprenorphine (SUBUTEX) 8 MG SUBL sublingual         tablet, Adult Pain Clinic Follow-Up Order,          PAIN INJECTION EVAL/TREAT/FOLLOW UP            (M51.36) DDD (degenerative disc disease), lumbar  Comment:   Plan: buprenorphine (SUBUTEX) 8 MG SUBL sublingual         tablet, Adult Pain Clinic Follow-Up Order,          oxyCODONE (ROXICODONE) 5 MG         tablet,  PAIN INJECTION         EVAL/TREAT/FOLLOW UP            (M45.7) Ankylosing spondylitis of lumbosacral region (H)  Comment:   Plan: buprenorphine (SUBUTEX) 8 MG SUBL sublingual         tablet, Adult Pain Clinic Follow-Up Order,        oxyCODONE (ROXICODONE) 5 MG         tablet            (M05.79) Rheumatoid arthritis involving multiple sites with positive rheumatoid factor (H)  Comment:   Plan: buprenorphine (SUBUTEX) 8 MG SUBL sublingual         tablet, Adult Pain Clinic Follow-Up Order,          oxyCODONE (ROXICODONE) 5 MG         tablet            (M25.50) Multiple joint pain  Comment:   Plan: buprenorphine (SUBUTEX) 8 MG SUBL sublingual         tablet, Adult Pain Clinic Follow-Up Order,      oxyCODONE  (ROXICODONE) 5 MG         tablet            (M62.838) Muscle spasm  Comment:   Plan: tiZANidine (ZANAFLEX) 4 MG tablet, Adult Pain         Clinic Follow-Up Order            (M79.18) Myofascial pain  Comment:   Plan: tiZANidine (ZANAFLEX) 4 MG tablet, Adult Pain         Clinic Follow-Up Order            (F11.20) Uncomplicated opioid dependence (H)  Comment:   Plan: buprenorphine (SUBUTEX) 8 MG SUBL sublingual         tablet, Adult Pain Clinic Follow-Up Order            (F11.90) Chronic, continuous use of opioids  Comment:   Plan: buprenorphine (SUBUTEX) 8 MG SUBL sublingual         tablet, Adult Pain Clinic Follow-Up Order,         oxyCODONE (ROXICODONE) 5 MG         tablet               BILLING TIME DOCUMENTATION:   TOTAL TIME includes:   Time spent preparing to see the patient: 3 minutes (reviewing records and tests)  Time spend face to face with the patient: 21 minutes  Time spent ordering tests, medications, procedures and referrals: 0 minutes  Time spent Referring and communicating with other healthcare professionals: 0 minutes  Documenting clinical information in Epic: 9 minutes    The total TIME spent on this patient on the day of the appointment was 33 minutes.                Susana GRANT, RN CNP, FNP  [facility]  [facility] [place]

## 2024-02-19 NOTE — TELEPHONE ENCOUNTER
[facility] Call Center    Phone Message    May a detailed message be left on voicemail: yes     Reason for Call: Medication Question or concern regarding medication   Prescription Clarification  Name of Medication: oxyCODONE (ROXICODONE) 5 MG tablet   Prescribing Provider: Shahzad    Pharmacy: [id number] [place], [place] - [address]    What on the order needs clarification? Patient called in and stated this medication was sent to wrong pharmacy. Was sent to [facility] but [email or web address] [place]       Action Taken: Message routed to:  Other: Pain    Travel Screening: Not Applicable

## 2024-02-20 ENCOUNTER — TELEPHONE (OUTPATIENT)
Dept: PALLIATIVE MEDICINE | Facility: CLINIC | Age: 59
End: 2024-02-20
Payer: COMMERCIAL

## 2024-02-20 DIAGNOSIS — M47.817 SPONDYLOSIS WITHOUT MYELOPATHY OR RADICULOPATHY, LUMBOSACRAL REGION: Primary | ICD-10-CM

## 2024-02-20 NOTE — TELEPHONE ENCOUNTER
Signed Prescriptions:                        Disp   Refills    oxyCODONE (ROXICODONE) 5 MG tablet         42 tab*0        Sig: Take 1 tablet (5 mg) by mouth every 6 hours as needed           for severe pain Max of 3/day. Fill/begin           2/19/2024 Short term script for acute pain flare           and not feeling well, off of his anti-rheum meds  Authorizing Provider: SUSANA PETTY        Reviewed [place]  February 19, 2024- no concerning fills.    Susana Petty APRN, RN CNP, FNP  [facility]  [facility] [place]

## 2024-02-20 NOTE — TELEPHONE ENCOUNTER
Please review and send to insurance for approval.     Loreto Alcaraz      [facility]  [facility]

## 2024-02-23 NOTE — TELEPHONE ENCOUNTER
Notification or Prior Authorization is not required for the requested services.      Decision ID #: [id number]        C. Facet Joint Denervation:    The thermal radiofrequency destruction of cervical, thoracic, or lumbar paravertebral facet joint (medial branch) nerves are considered medically reasonable and necessary for patients who meet ALL the following criteria:    Repeat thermal facet joint RFA at the same anatomic site is considered medically reasonable and necessary provided the patient had a minimum of consistent 50% improvement in pain for at least six (6) months or at least 50% consistent improvement in the ability to perform previously painful movements and ADLs as compared to baseline measurement using the same scale;  Frequency Limitation: For each covered spinal region no more than two (2) radiofrequency sessions will be reimbursed per rolling 12 months.      Okay to schedule    Yasemin MILLAN  Complex   [facility]

## 2024-02-23 NOTE — TELEPHONE ENCOUNTER
Screening Questions for RFA Procedure      Procedure ordered? (repeat bilateral lumbar RFA at L4, L5 and Sacral ala     What insurance are we billing for this procedure?  [facility]  IF SCHEDULING AT [facility] PLEASE SCHEDULE AT LEAST 7-10 BUSINESS DAYS OUT SO A PA CAN BE OBTAINED    Is patient scheduled at [facility]? no  If YES, route every encounter to [facility] CARE NAVIGATION POOL [[id number]]  Has patient had this injection before? No  Any chance of pregnancy? NO   If YES, do NOT schedule and route to RN pool     Is  Needed?: No  Will patient have a ?  Yes   If pt is given sedation meds, no driving for 24 hours.  Is pt taking a cab or transportation service? NO      If so will need to be accompanied by an adult too (friend/family member) in order for IV sedation to be given.    Per [place] Policy:  Outpatients are to have responsible adult or family member to accompany them at discharge and drive them home. A service providing medically trained drivers or attendants would be acceptable. Public transportation would not be acceptable unless the patient is accompanied by a responsible adult or family member.  Is patient taking any blood thinners (i.e. plavix, coumadin, jantoven, warfarin, heparin, pradaxa or dabigatran, etc)? No   If YES, do NOT schedule, and route to RN pool    Is patient taking any aspirin products? No   If more than 325mg/day, OK to schedule; Instruct pt to decrease to less than 325 mg for 7 days AND route to RN pool  For CERVICAL procedures, hold all aspirin products for 6 days.   Tell pt that if aspirin product is not held for 6 days, the procedure WILL BE cancelled.      Does the patient have a bleeding or clotting disorder? No   If YES, it it OKAY to schedule AND route to RN pool  **For any patients with platelet count <100, must be forwarded to provider**    Is patient diabetic? No If YES, have them bring their glucometer.    Does patient have an  active infection or treated for one within the past week? No   If YES, do NOT schedule and route to RN nurse pool     Is patient currently taking any antibiotics?  No  For patients on chronic, preventative, or prophylactic antibiotics, procedures may be scheduled.   For patients on antibiotics for active or recent infection:antibiotic course must have been completed for 4 days    Is patient currently taking any steroid medications? (i.e. Prednisone, Medrol)  No   For patients on steroid medications, course must have been completed for 4 days    Is patient actively being treated for cancer or immunocompromised, including the spleen having been removed? No  If YES, do NOT schedule and route to RN pool     Any history of complications with sedation medications?  NO   If YES, OK to schedule AND route to RN pool     Any history of sleep apnea?  NO   If YES, OK to schedule AND route to RN pool     Any cardiac history?  NO   If YES, OK to schedule AND route to RN pool     Do you have an implanted pacemaker, ICD (implanted cardiac device) or AICD (automatic implanted cardiac device)?  NO  If YES, do NOT schedule AND route to RN pool (for all providers including Dr Avendaño)   Obtain name of device :     Obtain name of cardiologist:       Do you have an implanted stimulator?  NO  If YES, OK to schedule AND route to nursing.   Instruct patient to bring in the remote to the appointment and it will need to be turned off.  reviewed      Does patient have an allergy to contrast dye, iodine or shellfish?  No   If YES, OK to schedule. Route to RN pool AND add allergy information to appointment notes    Are you able to get on and off an exam table with minimal or no assistance? Yes   If NO, do NOT schedule and route to RN pool    Are you able to roll over and lay on your stomach with minimal or no assistance? Yes   If NO, do NOT schedule and route to RN pool    Reminders:  If you are started on any steroids or antibiotics  between now and your appointment, you must contact us because it may affect our ability to perform your procedure.  Yes  Informed patient that s/he needs to fast for 6 hours before procedure?  YES  Informed patient that it is OK to take normal medications with sips of water, especially blood pressure medications, before the procedure and must hold blood thinners as instructed.  Yes  Informed patient to arrive 30 minutes before procedure time to have an IV inserted.  reviewed   Do NOT schedule at 0745, 0815 or 1245.  reviewed   All radiofrequency ablations are in a 90 minute time slot.  reviewed

## 2024-02-28 ENCOUNTER — MYC REFILL (OUTPATIENT)
Dept: RHEUMATOLOGY | Facility: CLINIC | Age: 59
End: 2024-02-28
Payer: COMMERCIAL

## 2024-02-28 ENCOUNTER — MYC MEDICAL ADVICE (OUTPATIENT)
Dept: RHEUMATOLOGY | Facility: CLINIC | Age: 59
End: 2024-02-28
Payer: COMMERCIAL

## 2024-02-28 DIAGNOSIS — M06.9 RHEUMATOID ARTHRITIS INVOLVING MULTIPLE SITES, UNSPECIFIED WHETHER RHEUMATOID FACTOR PRESENT (H): ICD-10-CM

## 2024-02-28 NOTE — TELEPHONE ENCOUNTER
Duplicate message, refill request sent to refill team.      BARBARA Paulson  Rheumatology/Infectious disease  [facility]   Rheumatology ph:[phone]  Infectious Disease ph:[phone]

## 2024-02-28 NOTE — TELEPHONE ENCOUNTER
methotrexate 2.5 MG tablet        Last Written Prescription Date:  12/1/23  Last Fill Quantity: 96,   # refills: 0  Last Office Visit: 12/1/23  Future Office visit:  6/7/24    CBC RESULTS:   Recent Labs   Lab Test 01/11/24  1106   WBC 6.9   RBC 4.78   HGB 14.5   HCT 42.7   MCV 89   MCH 30.3   MCHC 34.0   RDW 13.0          Creatinine   Date Value Ref Range Status   01/11/2024 1.01 0.67 - 1.17 mg/dL Final   06/16/2021 1.09 0.66 - 1.25 mg/dL Final   ]    Liver Function Studies -   Recent Labs   Lab Test 01/11/24  1106 05/17/23  1007 12/08/22  1425   PROTTOTAL  --   --  8.2   ALBUMIN 4.3   < > 4.0   BILITOTAL  --   --  0.6   ALKPHOS  --   --  112   AST 40   < > 32   ALT 49   < > 49    < > = values in this interval not displayed.       Routing refill request to provider for review/approval because:  DMARD              
DISCHARGE

## 2024-02-29 RX ORDER — METHOTREXATE 2.5 MG/1
20 TABLET ORAL
Qty: 96 TABLET | Refills: 1 | Status: SHIPPED | OUTPATIENT
Start: 2024-02-29 | End: 2024-08-05

## 2024-03-01 ENCOUNTER — TELEPHONE (OUTPATIENT)
Dept: FAMILY MEDICINE | Facility: CLINIC | Age: 59
End: 2024-03-01

## 2024-03-01 ENCOUNTER — OFFICE VISIT (OUTPATIENT)
Dept: FAMILY MEDICINE | Facility: CLINIC | Age: 59
End: 2024-03-01
Payer: COMMERCIAL

## 2024-03-01 VITALS
DIASTOLIC BLOOD PRESSURE: 86 MMHG | HEART RATE: 65 BPM | HEIGHT: 75 IN | RESPIRATION RATE: 20 BRPM | WEIGHT: 284 LBS | SYSTOLIC BLOOD PRESSURE: 140 MMHG | TEMPERATURE: 97.7 F | OXYGEN SATURATION: 97 % | BODY MASS INDEX: 35.31 KG/M2

## 2024-03-01 DIAGNOSIS — E55.9 VITAMIN D DEFICIENCY: ICD-10-CM

## 2024-03-01 DIAGNOSIS — E78.5 DYSLIPIDEMIA: ICD-10-CM

## 2024-03-01 DIAGNOSIS — D84.9 IMMUNOCOMPROMISED (H): ICD-10-CM

## 2024-03-01 DIAGNOSIS — B35.1 ONYCHOMYCOSIS: ICD-10-CM

## 2024-03-01 DIAGNOSIS — F11.90 CHRONIC, CONTINUOUS USE OF OPIOIDS: ICD-10-CM

## 2024-03-01 DIAGNOSIS — Z00.00 ENCOUNTER FOR MEDICARE ANNUAL WELLNESS EXAM: Primary | ICD-10-CM

## 2024-03-01 DIAGNOSIS — K59.03 DRUG-INDUCED CONSTIPATION: ICD-10-CM

## 2024-03-01 DIAGNOSIS — E66.01 MORBID OBESITY (H): ICD-10-CM

## 2024-03-01 DIAGNOSIS — R11.0 NAUSEA: ICD-10-CM

## 2024-03-01 DIAGNOSIS — I10 ESSENTIAL HYPERTENSION WITH GOAL BLOOD PRESSURE LESS THAN 140/90: ICD-10-CM

## 2024-03-01 PROCEDURE — 90686 IIV4 VACC NO PRSV 0.5 ML IM: CPT

## 2024-03-01 PROCEDURE — 90480 ADMN SARSCOV2 VAC 1/ONLY CMP: CPT

## 2024-03-01 PROCEDURE — G0438 PPPS, INITIAL VISIT: HCPCS

## 2024-03-01 PROCEDURE — G0009 ADMIN PNEUMOCOCCAL VACCINE: HCPCS

## 2024-03-01 PROCEDURE — 90677 PCV20 VACCINE IM: CPT

## 2024-03-01 PROCEDURE — 82306 VITAMIN D 25 HYDROXY: CPT

## 2024-03-01 PROCEDURE — 36415 COLL VENOUS BLD VENIPUNCTURE: CPT

## 2024-03-01 PROCEDURE — 91320 SARSCV2 VAC 30MCG TRS-SUC IM: CPT

## 2024-03-01 PROCEDURE — 80053 COMPREHEN METABOLIC PANEL: CPT

## 2024-03-01 PROCEDURE — 99214 OFFICE O/P EST MOD 30 MIN: CPT | Mod: 25

## 2024-03-01 PROCEDURE — G0008 ADMIN INFLUENZA VIRUS VAC: HCPCS

## 2024-03-01 RX ORDER — ATENOLOL 25 MG/1
25 TABLET ORAL DAILY
Qty: 90 TABLET | Refills: 3 | Status: SHIPPED | OUTPATIENT
Start: 2024-03-01 | End: 2024-06-10 | Stop reason: SINTOL

## 2024-03-01 RX ORDER — DOCUSATE SODIUM 100 MG/1
100 CAPSULE, LIQUID FILLED ORAL 2 TIMES DAILY
Qty: 60 CAPSULE | Refills: 3 | Status: SHIPPED | OUTPATIENT
Start: 2024-03-01 | End: 2024-07-03

## 2024-03-01 RX ORDER — ATORVASTATIN CALCIUM 20 MG/1
20 TABLET, FILM COATED ORAL DAILY
Qty: 90 TABLET | Refills: 0 | Status: SHIPPED | OUTPATIENT
Start: 2024-03-01 | End: 2024-06-05

## 2024-03-01 RX ORDER — ONDANSETRON 4 MG/1
4 TABLET, ORALLY DISINTEGRATING ORAL EVERY 8 HOURS PRN
Qty: 30 TABLET | Refills: 3 | Status: SHIPPED | OUTPATIENT
Start: 2024-03-01

## 2024-03-01 SDOH — HEALTH STABILITY: PHYSICAL HEALTH: ON AVERAGE, HOW MANY MINUTES DO YOU ENGAGE IN EXERCISE AT THIS LEVEL?: 20 MIN

## 2024-03-01 SDOH — HEALTH STABILITY: PHYSICAL HEALTH: ON AVERAGE, HOW MANY DAYS PER WEEK DO YOU ENGAGE IN MODERATE TO STRENUOUS EXERCISE (LIKE A BRISK WALK)?: 3 DAYS

## 2024-03-01 ASSESSMENT — PATIENT HEALTH QUESTIONNAIRE - PHQ9
SUM OF ALL RESPONSES TO PHQ QUESTIONS 1-9: 13
10. IF YOU CHECKED OFF ANY PROBLEMS, HOW DIFFICULT HAVE THESE PROBLEMS MADE IT FOR YOU TO DO YOUR WORK, TAKE CARE OF THINGS AT HOME, OR GET ALONG WITH OTHER PEOPLE: VERY DIFFICULT
SUM OF ALL RESPONSES TO PHQ QUESTIONS 1-9: 13

## 2024-03-01 ASSESSMENT — PAIN SCALES - GENERAL: PAINLEVEL: EXTREME PAIN (9)

## 2024-03-01 ASSESSMENT — SOCIAL DETERMINANTS OF HEALTH (SDOH): HOW OFTEN DO YOU GET TOGETHER WITH FRIENDS OR RELATIVES?: NEVER

## 2024-03-01 NOTE — PATIENT INSTRUCTIONS
Hepatitis A and B vaccines at pharmacy  Preventive Care Advice   This is general advice given by our system to help you stay healthy. However, your care team may have specific advice just for you. Please talk to your care team about your preventive care needs.  Nutrition  Eat 5 or more servings of fruits and vegetables each day.  Try wheat bread, brown rice and whole grain pasta (instead of white bread, rice, and pasta).  Get enough calcium and vitamin D. Check the label on foods and aim for 100% of the RDA (recommended daily allowance).  Lifestyle  Exercise at least 150 minutes each week   (30 minutes a day, 5 days a week).  Do muscle strengthening activities 2 days a week. These help control your weight and prevent disease.  No smoking.  Wear sunscreen to prevent skin cancer.  Have a dental exam and cleaning every 6 months.  Yearly exams  See your health care team every year to talk about:  Any changes in your health.  Any medicines your care team has prescribed.  Preventive care, family planning, and ways to prevent chronic diseases.  Shots (vaccines)   HPV shots (up to age 26), if you've never had them before.  Hepatitis B shots (up to age 59), if you've never had them before.  COVID-19 shot: Get this shot when it's due.  Flu shot: Get a flu shot every year.  Tetanus shot: Get a tetanus shot every 10 years.  Pneumococcal, hepatitis A, and RSV shots: Ask your care team if you need these based on your risk.  Shingles shot (for age 50 and up).  General health tests  Diabetes screening:  Starting at age 35, Get screened for diabetes at least every 3 years.  If you are younger than age 35, ask your care team if you should be screened for diabetes.  Cholesterol test: At age 39, start having a cholesterol test every 5 years, or more often if advised.  Bone density scan (DEXA): At age 50, ask your care team if you should have this scan for osteoporosis (brittle bones).  Hepatitis C: Get tested at least once in your  life.  STIs (sexually transmitted infections)  Before age 24: Ask your care team if you should be screened for STIs.  After age 24: Get screened for STIs if you're at risk. You are at risk for STIs (including HIV) if:  You are sexually active with more than one person.  You don't use condoms every time.  You or a partner was diagnosed with a sexually transmitted infection.  If you are at risk for HIV, ask about PrEP medicine to prevent HIV.  Get tested for HIV at least once in your life, whether you are at risk for HIV or not.  Cancer screening tests  Cervical cancer screening: If you have a cervix, begin getting regular cervical cancer screening tests at age 21. Most people who have regular screenings with normal results can stop after age 65. Talk about this with your provider.  Breast cancer scan (mammogram): If you've ever had breasts, begin having regular mammograms starting at age 40. This is a scan to check for breast cancer.  Colon cancer screening: It is important to start screening for colon cancer at age 45.  Have a colonoscopy test every 10 years (or more often if you're at risk) Or, ask your provider about stool tests like a FIT test every year or Cologuard test every 3 years.  To learn more about your testing options, visit: https://www.1World Online/869723.pdf.  For help making a decision, visit: https://bit.ly/bc83553.  Prostate cancer screening test: If you have a prostate and are age 55 to 69, ask your provider if you would benefit from a yearly prostate cancer screening test.  Lung cancer screening: If you are a current or former smoker age 50 to 80, ask your care team if ongoing lung cancer screenings are right for you.  For informational purposes only. Not to replace the advice of your health care provider. Copyright   2023 [employer]. All rights reserved. Clinically reviewed by the [facility] Transitions Program. ZAP [id number] - REV 01/24.    Hearing Loss: Care  Instructions  Overview     Hearing loss is a sudden or slow decrease in how well you hear. It can range from slight to profound. Permanent hearing loss can occur with aging. It also can happen when you are exposed long-term to loud noise. Examples include listening to loud music, riding motorcycles, or being around other loud machines.  Hearing loss can affect your work and home life. It can make you feel lonely or depressed. You may feel that you have lost your independence. But hearing aids and other devices can help you hear better and feel connected to others.  Follow-up care is a key part of your treatment and safety. Be sure to make and go to all appointments, and call your doctor if you are having problems. It's also a good idea to know your test results and keep a list of the medicines you take.  How can you care for yourself at home?  Avoid loud noises whenever possible. This helps keep your hearing from getting worse.  Always wear hearing protection around loud noises.  Wear a hearing aid as directed.  A professional can help you pick a hearing aid that will work best for you.  You can also get hearing aids over the counter for mild to moderate hearing loss.  Have hearing tests as your doctor suggests. They can show whether your hearing has changed. Your hearing aid may need to be adjusted.  Use other devices as needed. These may include:  Telephone amplifiers and hearing aids that can connect to a television, stereo, radio, or microphone.  Devices that use lights or vibrations. These alert you to the doorbell, a ringing telephone, or a baby monitor.  Television closed-captioning. This shows the words at the bottom of the screen. Most new TVs can do this.  TTY (text telephone). This lets you type messages back and forth on the telephone instead of talking or listening. These devices are also called TDD. When messages are typed on the keyboard, they are sent over the phone line to a receiving TTY. The  "message is shown on a monitor.  Use text messaging, social media, and email if it is hard for you to communicate by telephone.  Try to learn a listening technique called speechreading. It is not lipreading. You pay attention to people's gestures, expressions, posture, and tone of voice. These clues can help you understand what a person is saying. Face the person you are talking to, and have them face you. Make sure the lighting is good. You need to see the other person's face clearly.  Think about counseling if you need help to adjust to your hearing loss.  When should you call for help?  Watch closely for changes in your health, and be sure to contact your doctor if:    You think your hearing is getting worse.     You have new symptoms, such as dizziness or nausea.   Where can you learn more?  Go to https://www.[employer].net/patiented  Enter R798 in the search box to learn more about \"Hearing Loss: Care Instructions.\"  Current as of: February 28, 2023               Content Version: 13.8    8878-8800 [employer].   Care instructions adapted under license by your healthcare professional. If you have questions about a medical condition or this instruction, always ask your healthcare professional. [employer] disclaims any warranty or liability for your use of this information.      Relationships for Good Health  Relationships are important for our health and happiness. Social isolation, loneliness and lack of support are bad for your health. Studies show that loneliness can harm health and limit your life span as much as high blood pressure and smoking.   Take some time to reflect on your relationships. Then answer these questions:  Are there people in your life that cause you stress or drain your energy? What can you do to set " limits?  ________________________________________________________________________________________________________________________________________________________________________________________________________________________________________________________________________________________________________________________________________________  Who do you enjoy spending time with? Who can you go to for support?  ________________________________________________________________________________________________________________________________________________________________________________________________________________________________________________________________________________________________________________________________________________  What can you do to improve your relationships with others?  __________________________________________________________________________________________________________________________________________________________________________________________________________________  ______________________________________________________________________________________________________________________________  What do you like most about your relationships with others?  ________________________________________________________________________________________________________________________________________________________________________________________________________________________________________________________________________________________________________________________________________________  My goal: ______________________________________________________________________  I will ______________________________________________________________________________________________________________________________________________________________________________________________    For informational purposes only. Not to replace the advice of your health care provider. Copyright   2018 [facility]. All rights reserved. Clinically reviewed by [facility]  Team. [email or web address] [id number] - Rev 04/21.    Learning About Stress  What is stress?     Stress is your body's response to a hard situation. Your body can have a physical, emotional, or mental response. Stress is a fact of life for most people, and it affects everyone differently. What causes stress for you may not be stressful for someone else.  A lot of things can cause stress. You may feel stress when you go on a job interview, take a test, or run a race. This kind of short-term stress is normal and even useful. It can help you if you need to work hard or react quickly. For example, stress can help you finish an important job on time.  Long-term stress is caused by ongoing stressful situations or events. Examples of long-term stress include long-term health problems, ongoing problems at work, or conflicts in your family. Long-term stress can harm your health.  How does stress affect your health?  When you are stressed, your body responds as though you are in danger. It makes hormones that speed up your heart, make you breathe faster, and give you a burst of energy. This is called the fight-or-flight stress response. If the stress is over quickly, your body goes back to normal and no harm is done.  But if stress happens too often or lasts too long, it can have bad effects. Long-term stress can make you more likely to get sick, and it can make symptoms of some diseases worse. If you tense up when you are stressed, you may develop neck, shoulder, or low back pain. Stress is linked to high blood pressure and heart disease.  Stress also harms your emotional health. It can make you smith, tense, or depressed. Your relationships may suffer, and you may not do well at work or school.  What can you do to manage stress?  You can try these things to help manage stress:   Do something active. Exercise or activity can help reduce stress. Walking is  a great way to get started. Even everyday activities such as housecleaning or yard work can help.  Try yoga or luis chi. These techniques combine exercise and meditation. You may need some training at first to learn them.  Do something you enjoy. For example, listen to music or go to a movie. Practice your hobby or do volunteer work.  Meditate. This can help you relax, because you are not worrying about what happened before or what may happen in the future.  Do guided imagery. Imagine yourself in any setting that helps you feel calm. You can use online videos, books, or a teacher to guide you.  Do breathing exercises. For example:  From a standing position, bend forward from the waist with your knees slightly bent. Let your arms dangle close to the floor.  Breathe in slowly and deeply as you return to a standing position. Roll up slowly and lift your head last.  Hold your breath for just a few seconds in the standing position.  Breathe out slowly and bend forward from the waist.  Let your feelings out. Talk, laugh, cry, and express anger when you need to. Talking with supportive friends or family, a counselor, or a rhea leader about your feelings is a healthy way to relieve stress. Avoid discussing your feelings with people who make you feel worse.  Write. It may help to write about things that are bothering you. This helps you find out how much stress you feel and what is causing it. When you know this, you can find better ways to cope.  What can you do to prevent stress?  You might try some of these things to help prevent stress:  Manage your time. This helps you find time to do the things you want and need to do.  Get enough sleep. Your body recovers from the stresses of the day while you are sleeping.  Get support. Your family, friends, and community can make a difference in how you experience stress.  Limit your news feed. Avoid or limit time on social media or news that may make you feel stressed.  Do  "something active. Exercise or activity can help reduce stress. Walking is a great way to get started.  Where can you learn more?  Go to https://www.YumD[employer].net/patiented  Enter N032 in the search box to learn more about \"Learning About Stress.\"  Current as of: February 26, 2023               Content Version: 13.8    8815-6469 [employer].   Care instructions adapted under license by your healthcare professional. If you have questions about a medical condition or this instruction, always ask your healthcare professional. [employer] disclaims any warranty or liability for your use of this information.      Learning About Sleeping Well  What does sleeping well mean?     Sleeping well means getting enough sleep to feel good and stay healthy. How much sleep is enough varies among people.  The number of hours you sleep and how you feel when you wake up are both important. If you do not feel refreshed, you probably need more sleep. Another sign of not getting enough sleep is feeling tired during the day.  Experts recommend that adults get at least 7 or more hours of sleep per day. Children and older adults need more sleep.  Why is getting enough sleep important?  Getting enough quality sleep is a basic part of good health. When your sleep suffers, your physical health, mood, and your thoughts can suffer too. You may find yourself feeling more grumpy or stressed. Not getting enough sleep also can lead to serious problems, including injury, accidents, anxiety, and depression.  What might cause poor sleeping?  Many things can cause sleep problems, including:  Changes to your sleep schedule.  Stress. Stress can be caused by fear about a single event, such as giving a speech. Or you may have ongoing stress, such as worry about work or school.  Depression, anxiety, and other mental or emotional conditions.  Changes in your sleep habits or surroundings. This includes changes that happen where you " "sleep, such as noise, light, or sleeping in a different bed. It also includes changes in your sleep pattern, such as having jet lag or working a late shift.  Health problems, such as pain, breathing problems, and restless legs syndrome.  Lack of regular exercise.  Using alcohol, nicotine, or caffeine before bed.  How can you help yourself?  Here are some tips that may help you sleep more soundly and wake up feeling more refreshed.  Your sleeping area   Use your bedroom only for sleeping and sex. A bit of light reading may help you fall asleep. But if it doesn't, do your reading elsewhere in the house. Try not to use your TV, computer, smartphone, or tablet while you are in bed.  Be sure your bed is big enough to stretch out comfortably, especially if you have a sleep partner.  Keep your bedroom quiet, dark, and cool. Use curtains, blinds, or a sleep mask to block out light. To block out noise, use earplugs, soothing music, or a \"white noise\" machine.  Your evening and bedtime routine   Create a relaxing bedtime routine. You might want to take a warm shower or bath, or listen to soothing music.  Go to bed at the same time every night. And get up at the same time every morning, even if you feel tired.  What to avoid   Limit caffeine (coffee, tea, caffeinated sodas) during the day, and don't have any for at least 6 hours before bedtime.  Avoid drinking alcohol before bedtime. Alcohol can cause you to wake up more often during the night.  Try not to smoke or use tobacco, especially in the evening. Nicotine can keep you awake.  Limit naps during the day, especially close to bedtime.  Avoid lying in bed awake for too long. If you can't fall asleep or if you wake up in the middle of the night and can't get back to sleep within about 20 minutes, get out of bed and go to another room until you feel sleepy.  Avoid taking medicine right before bed that may keep you awake or make you feel hyper or energized. Your doctor can " "tell you if your medicine may do this and if you can take it earlier in the day.  If you can't sleep   Imagine yourself in a peaceful, pleasant scene. Focus on the details and feelings of being in a place that is relaxing.  Get up and do a quiet or boring activity until you feel sleepy.  Avoid drinking any liquids before going to bed to help prevent waking up often to use the bathroom.  Where can you learn more?  Go to https://www.MedPro.net/patiented  Enter J942 in the search box to learn more about \"Learning About Sleeping Well.\"  Current as of: July 11, 2023               Content Version: 13.8    9083-2873 [employer].   Care instructions adapted under license by your healthcare professional. If you have questions about a medical condition or this instruction, always ask your healthcare professional. [employer] disclaims any warranty or liability for your use of this information.      Learning About Depression Screening  What is depression screening?  Depression screening is a way to see if you have depression symptoms. It may be done by a doctor or counselor. It's often part of a routine checkup. That's because your mental health is just as important as your physical health.  Depression is a mental health condition that affects how you feel, think, and act. You may:  Have less energy.  Lose interest in your daily activities.  Feel sad and grouchy for a long time.  Depression is very common. It affects people of all ages.  Many things can lead to depression. Some people become depressed after they have a stroke or find out they have a major illness like cancer or heart disease. The death of a loved one or a breakup may lead to depression. It can run in families. Most experts believe that a combination of inherited genes and stressful life events can cause it.  What happens during screening?  You may be asked to fill out a form about your depression symptoms. You and the doctor will " "discuss your answers. The doctor may ask you more questions to learn more about how you think, act, and feel.  What happens after screening?  If you have symptoms of depression, your doctor will talk to you about your options.  Doctors usually treat depression with medicines or counseling. Often, combining the two works best. Many people don't get help because they think that they'll get over the depression on their own. But people with depression may not get better unless they get treatment.  The cause of depression is not well understood. There may be many factors involved. But if you have depression, it's not your fault.  A serious symptom of depression is thinking about death or suicide. If you or someone you care about talks about this or about feeling hopeless, get help right away.  It's important to know that depression can be treated. Medicine, counseling, and self-care may help.  Where can you learn more?  Go to https://www.[employer]oche.[employer]/patiented  Enter T185 in the search box to learn more about \"Learning About Depression Screening.\"  Current as of: June 25, 2023               Content Version: 13.8    7850-2986 [employer].   Care instructions adapted under license by your healthcare professional. If you have questions about a medical condition or this instruction, always ask your healthcare professional. [employer] disclaims any warranty or liability for your use of this information.      Chronic Pain: Care Instructions  Your Care Instructions     Chronic pain is pain that lasts a long time (months or even years) and may or may not have a clear cause. It is different from acute pain, which usually does have a clear cause--like an injury or illness--and gets better over time. Chronic pain:  Lasts over time but may vary from day to day.  Does not go away despite efforts to end it.  May disrupt your sleep and lead to fatigue.  May cause depression or anxiety.  May make your " muscles tense, causing more pain.  Can disrupt your work, hobbies, home life, and relationships with friends and family.  Chronic pain is a very real condition. It is not just in your head. Treatment can help and usually includes several methods used together, such as medicines, physical therapy, exercise, and other treatments. Learning how to relax and changing negative thought patterns can also help you cope.  Chronic pain is complex. Taking an active role in your treatment will help you better manage your pain. Tell your doctor if you have trouble dealing with your pain. You may have to try several things before you find what works best for you.  Follow-up care is a key part of your treatment and safety. Be sure to make and go to all appointments, and call your doctor if you are having problems. It's also a good idea to know your test results and keep a list of the medicines you take.  How can you care for yourself at home?  Pace yourself. Break up large jobs into smaller tasks. Save harder tasks for days when you have less pain, or go back and forth between hard tasks and easier ones. Take rest breaks.  Relax, and reduce stress. Relaxation techniques such as deep breathing or meditation can help.  Keep moving. Gentle, daily exercise can help reduce pain over the long run. Try low- or no-impact exercises such as walking, swimming, and stationary biking. Do stretches to stay flexible.  Try heat, cold packs, and massage.  Get enough sleep. Chronic pain can make you tired and drain your energy. Talk with your doctor if you have trouble sleeping because of pain.  Think positive. Your thoughts can affect your pain level. Do things that you enjoy to distract yourself when you have pain instead of focusing on the pain. See a movie, read a book, listen to music, or spend time with a friend.  If you think you are depressed, talk to your doctor about treatment.  Keep a daily pain diary. Record how your moods, thoughts,  sleep patterns, activities, and medicine affect your pain. You may find that your pain is worse during or after certain activities or when you are feeling a certain emotion. Having a record of your pain can help you and your doctor find the best ways to treat your pain.  Take pain medicines exactly as directed.  If the doctor gave you a prescription medicine for pain, take it as prescribed.  If you are not taking a prescription pain medicine, ask your doctor if you can take an over-the-counter medicine.  Reducing constipation caused by pain medicine  Talk to your doctor about a laxative. If a laxative doesn't work, your doctor may suggest a prescription medicine.  Include fruits, vegetables, beans, and whole grains in your diet each day. These foods are high in fiber.  If your doctor recommends it, get more exercise. Walking is a good choice. Bit by bit, increase the amount you walk every day. Try for at least 30 minutes on most days of the week.  Schedule time each day for a bowel movement. A daily routine may help. Take your time and do not strain when having a bowel movement.  When should you call for help?   Call your doctor now or seek immediate medical care if:    Your pain gets worse or is out of control.     You feel down or blue, or you do not enjoy things like you once did. You may be depressed, which is common in people with chronic pain. Depression can be treated.     You have vomiting or cramps for more than 2 hours.   Watch closely for changes in your health, and be sure to contact your doctor if:    You cannot sleep because of pain.     You are very worried or anxious about your pain.     You have trouble taking your pain medicine.     You have any concerns about your pain medicine.     You have trouble with bowel movements, such as:  No bowel movement in 3 days.  Blood in the anal area, in your stool, or on the toilet paper.  Diarrhea for more than 24 hours.   Where can you learn more?  Go to  "https://www.healthTripChamp.net/patiented  Enter N004 in the search box to learn more about \"Chronic Pain: Care Instructions.\"  Current as of: July 11, 2023               Content Version: 13.8    6740-6366 [employer], [employer].   Care instructions adapted under license by your healthcare professional. If you have questions about a medical condition or this instruction, always ask your healthcare professional. Healthwise, [employer] disclaims any warranty or liability for your use of this information.      "

## 2024-03-01 NOTE — COMMUNITY RESOURCES LIST (ENGLISH)
03/01/2024   [facility]  N/A  For questions about this resource list or additional care needs, please contact your primary care clinic or care manager.  Phone: [phone]   Email: N/A   Address: [address] [place] [address]   Hours: N/A        Financial Stability       Rent and mortgage payment assistance  1  [employer] Distance: 7.81 miles      Phone/Virtual   [address] [place] [address]  Language: English  Hours: Mon - Fri 9:00 AM - 5:00 PM  Fees: Free   Phone: (783) 575-5910 Email: zjrvkmdnh2fzaafzc@[employer] Website: https://yoxtiomqu8tmfgnbg.org/     2  [employer] ([employer]ER) - Emergency Assistance - Rent and mortgage payment assistance Distance: 8.8 miles      In-Person   [address] [place] [address]  Language: English, Singaporean  Hours: Mon 10:00 AM - 1:30 PM Appt. Only, Wed 10:00 AM - 1:30 PM Appt. Only, Thu 4:30 PM - 6:30 PM Appt. Only, Fri 10:00 AM - 1:30 PM Appt. Only  Fees: Free   Phone: (860) 242-4016 Email: info@[employer].org Website: http://www.[employer] ASf.org          Food and Nutrition       Food pantry  3  [facility] ([facility] Food Shelf) Distance: 4.24 miles      [facility]   [address] [place] [address]  Language: English  Hours: Mon 10:00 AM - 3:00 PM , Tue 12:00 PM - 5:00 PM , Wed 10:00 AM - 3:00 PM , Thu 2:00 PM - 7:00 PM  Fees: Free   Phone: (315) 849-6429 Email: support@[facility].org Website: http://[facility] Hospital[facility]u.org     4  [employer] (CROSS) - Food Market Distance: 6.1 miles      In-Person   [address] [facility] [address] [place] [address]  Language: English, Singaporean  Hours: Mon 9:00 AM - 2:00 PM , Tue 9:00 PM - 7:00 PM , Wed - Fri 9:00 AM - 2:00 PM  Fees: Free   Phone: (146) 650-2193 Email: info@[employer] Website: http://Zaiseoul.org/     SNAP application assistance  5  [facility] Distance: 11.66 miles       Phone/Virtual   [address] [place] [address]  Language: English, Mohawk  Hours: Mon 9:00 AM - 1:00 PM , Tue - Thu 9:00 AM - 4:00 PM , Fri 9:00 AM - 1:00 PM  Fees: Free   Phone: (738) 774-3024 Email: info@[employer] Website: http://www.Mediastream.org/     6  [facility] Economic Assistance Department Distance: 12.45 miles      Phone/Virtual   [address] [address]  Language: English  Hours: Mon - Fri 8:15 AM - 4:00 PM  Fees: Free   Phone: (599) 985-6187 Email: paperwork@[facility].mn. Website: http://www.[facility]./193/Economic-Assistance     Soup kitchen or free meals  7  [facility] Meals Distance: 3.58 miles      In-Person   [address] [place] [address]  Language: English  Hours: Wed 5:30 PM - 6:15 PM  Fees: Free   Phone: (905) 868-9901 Email: priscilla@[facility] Website: http://www.[facility].org/     8  [facility] - Family Table Meals - Family Table Meal Distance: 7.58 miles      [facility]   [address] [place] [address]  Language: English  Hours: Thu 5:00 PM - 6:30 PM  Fees: Free   Phone: (955) 880-2097 Email: Myca Health@[facility] HealthNeolane[facility].[employer] Website: http://www.[facility].org          Transportation       Free or low-cost transportation  9  [employer] Distance: 6.91 miles      In-Person   [address] [place] [address]  Language: English  Hours: Mon - Fri 6:00 AM - 6:00 PM  Fees: Insurance, Self Pay   Phone: (172) 778-2294 Email: info@[employer]ters Website: http://www.LibraryThing.37mhealth     10  [facility] - Bus Passes - Free or low-cost transportation Distance: 20.36 miles      In-Person   [address] [place] [address]  Language: English  Hours: Tue 9:30 AM - 11:30 AM , Thu 9:30 AM - 11:30 AM  Fees: Free   Phone: (901) 563-9123 Email: info@[facility] Website: http://www.[facility].org/     Transportation to medical appointments  11  [employer] Distance: 4.69 miles      In-Person   [address] [place] [address]  Language: Danish, English, Martiniquais, Emirati  Hours: Mon - Sun Open 24 Hours  Fees: Insurance, Self Pay, State or [facility] Child Welfare Agency   Phone: (609) 548-9232 Email: info@[employer] Website: https://www.[facility].info/Providers/[employer]adi_[employer]_Trans[employer]_LLC/Transportation/1?returnUrl=%2FSpecialTopics%2Fpeoplewithdisabilities%3S14931%3F&pos=1     12  [employer] Distance: 6.91 miles      In-Person   [address] [place] [address]  Language: English  Hours: Mon - Fri 6:00 AM - 6:00 PM  Fees: Insurance, Self Pay   Phone: (616) 951-7385 Email: info@[employer] Website: http://www.[employer] Code          Important Numbers & Websites       Emergency Services   911  City Services   311  Poison Control   (667) 993-6877  Suicide Prevention Lifeline   (270) 131-4964 (TALK)  Child Abuse Hotline   (385) 324-7142 (4-A-Child)  Sexual Assault Hotline   (807) 555-4106 (HOPE)  National Runaway Safeline   (396) 644-2129 (RUNAWAY)  All-Options Talkline   (178) 814-2247  Substance Abuse Referral   (243) 286-1339 (HELP)

## 2024-03-01 NOTE — TELEPHONE ENCOUNTER
Retail Pharmacy Prior Authorization Team   Phone: [phone]    PRIOR AUTHORIZATION DENIED    Medication: ONDANSETRON 4 MG PO TBDP  Insurance Company: [employer] ([facility]) - Phone [phone] Fax [phone]  Denial Date: 3/1/2024  Denial Reason(s):       Appeal Information: IF PROVIDER WOULD LIKE TO APPEAL THIS DECISION PLEASE PROVIDE THE [place] TEAM WITH A LETTER OF MEDICAL NECESSITY      Patient Notified: No

## 2024-03-01 NOTE — PROGRESS NOTES
Preventive Care Visit  [facility]  JUANITA Curran CNP, Family Medicine  Mar 1, 2024      Assessment & Plan     Encounter for Medicare annual wellness exam  - Health maintenance and lifestyle reviewed. Updated medical and family history.  - Follow up in one year or sooner as needed.   - COVID-19 12+ (2023-24) (PFIZER)  - Pneumococcal 20 Valent Conjugate (Prevnar 20)  - REVIEW OF HEALTH MAINTENANCE PROTOCOL ORDERS  - PRIMARY CARE FOLLOW-UP SCHEDULING  - INFLUENZA VACCINE >6 MONTHS (AFLURIA/FLUZONE)    Immunocompromised (H24)  - Noted, on methotrexate and Xeljanz for rheumatoid arthritis and ankylosing spondylitis. Follows with rheumatology.  - Appropriate vaccines discussed.    Morbid obesity (H)  - Noted. Discussed diet and exercise guidelines.     Dyslipidemia  - Tolerating atorvastatin without adverse effects. Refilled at current dose.   - atorvastatin (LIPITOR) 20 MG tablet  Dispense: 90 tablet; Refill: 0    Chronic, continuous use of opioids  - Noted. Follows with pain management.     Nausea  - Intermittent. No clear etiology. Suspect may be due to medications.   - Comprehensive metabolic panel (BMP + Alb, Alk Phos, ALT, AST, Total. Bili, TP)  - ondansetron (ZOFRAN ODT) 4 MG ODT tab  Dispense: 30 tablet; Refill: 3  - US Abdomen Limited    Drug-induced constipation  - Start stool softener. Discussed PRN use for constipation.   - docusate sodium (COLACE) 100 MG capsule  Dispense: 60 capsule; Refill: 3    Vitamin D deficiency  - Vitamin D deficiency screening    Onychomycosis  - Noted and discussed with patient. No treatment indicated at this time.     Essential hypertension with goal blood pressure less than 140/90  - Borderline. Recommended home monitoring.   - Comprehensive metabolic panel (BMP + Alb, Alk Phos, ALT, AST, Total. Bili, TP)  - atenolol (TENORMIN) 25 MG tablet  Dispense: 90 tablet; Refill: 3    BMI  Estimated body mass index is 35.5 kg/m  as calculated from the  "following:    Height as of this encounter: 1.905 m (6' 3\").    Weight as of this encounter: 128.8 kg (284 lb).     Counseling  Appropriate preventive services were discussed with this patient, including applicable screening as appropriate for fall prevention, nutrition, physical activity, Tobacco-use cessation, weight loss and cognition.  Checklist reviewing preventive services available has been given to the patient.  Reviewed patient's diet, addressing concerns and/or questions.   He is at risk for lack of exercise and has been provided with information to increase physical activity for the benefit of his well-being.   Patient is at risk for social isolation and has been provided with information about the benefit of social connection.   The patient was instructed to see the dentist every 6 months.   Discussed possible causes of fatigue. The patient was provided with written information regarding signs of hearing loss.   The patient's PHQ-9 score is consistent with moderate depression. He was provided with information regarding depression.   I have reviewed Opioid Use Disorder and Substance Use Disorder risk factors and made any needed referrals.     Return to care for preventive services in one year.    Subjective   Les is a 58 year old, presenting for the following:  Physical        3/1/2024     8:42 AM   Additional Questions   Roomed by kiarra         3/1/2024     8:42 AM   Patient Reported Additional Medications   Patient reports taking the following new medications none     Health Care Directive  Patient does not have a Health Care Directive or Living Will: Discussed advance care planning with patient; information given to patient to review.    HPI    Patient establishing care. Follows rheumatology for ankylosing spondylitis and rheumatoid arthritis, on immunosuppressants. Follows pain management for chronic pain. Movement and activity are limited by pain. Patient reports constipation, most likely related to " medication. Intermittent nausea, occasional RUQ pain after eating that has caused him to have a poor appetite.  Toenail fungus- mild to moderate. No pain.         3/1/2024   General Health   How would you rate your overall physical health? (!) FAIR   Feel stress (tense, anxious, or unable to sleep) Very much   (!) STRESS CONCERN      3/1/2024   Nutrition   Diet: Regular (no restrictions)         3/1/2024   Exercise   Days per week of moderate/strenous exercise 3 days   Average minutes spent exercising at this level 20 min         3/1/2024   Social Factors   Frequency of gathering with friends or relatives Never   Worry food won't last until get money to buy more No   Food not last or not have enough money for food? Yes   Do you have housing?  Yes   Are you worried about losing your housing? Yes   Lack of transportation? Yes   Unable to get utilities (heat,electricity)? No   Want help with housing or utility concern? No   (!) FOOD SECURITY CONCERN PRESENT (!) TRANSPORTATION CONCERN PRESENT(!) HOUSING CONCERN PRESENT(!) SOCIAL CONNECTIONS CONCERN      3/1/2024   Fall Risk   Fallen 2 or more times in the past year? No   Trouble with walking or balance? No          3/1/2024   Activities of Daily Living- Home Safety   Needs help with the following daily activites None of the above   Safety concerns in the home None of the above         3/1/2024   Dental   Dentist two times every year? (!) NO         3/1/2024   Hearing Screening   Hearing concerns? (!) IT'S HARD TO FOLLOW A CONVERSATION IN A NOISY RESTAURANT OR CROWDED ROOM.         3/1/2024   Driving Risk Screening   Patient/family members have concerns about driving No         3/1/2024   General Alertness/Fatigue Screening   Have you been more tired than usual lately? (!) YES         3/1/2024   Urinary Incontinence Screening   Bothered by leaking urine in past 6 months No     Today's PHQ-9 Score:       3/1/2024     8:27 AM   PHQ-9 SCORE   PHQ-9 Total Score Shharam Perry  (Moderate depression)   PHQ-9 Total Score 13         3/1/2024   Substance Use   Alcohol more than 3/day or more than 7/wk Not Applicable   Do you have a current opioid prescription? (!) YES   How severe/bad is pain from 1 to 10? 9/10   Do you use any other substances recreationally? No         3/8/2024     8:29 AM   OPIOID RISK TOOL TOTAL SCORE   Total Score 1     Social History     Tobacco Use    Smoking status: Never    Smokeless tobacco: Former     Types: Chew     Quit date: 12/12/2019    Tobacco comments:     still chews nicotine gum   Vaping Use    Vaping Use: Never used   Substance Use Topics    Alcohol use: No     Comment: none    Drug use: Yes     Frequency: 7.0 times per week     Types: Marijuana     Comment: medicinal (oral)     Last PSA:   PSA   Date Value Ref Range Status   07/01/2016 0.37 0 - 4 ug/L Final     ASCVD Risk   The 10-year ASCVD risk score (Chauncey BANDA, et al., 2019) is: 10.9%    Values used to calculate the score:      Age: 58 years      Sex: Male      Is Non- : No      Diabetic: No      Tobacco smoker: No      Systolic Blood Pressure: 156 mmHg      Is BP treated: Yes      HDL Cholesterol: 41 mg/dL      Total Cholesterol: 185 mg/dL    Reviewed and updated as needed this visit by Provider   Tobacco  Allergies  Meds  Problems  Med Hx  Surg Hx  Fam Hx  Soc   Hx Sexual Activity        Current providers sharing in care for this patient include:  Patient Care Team:  Minda Monzon APRN CNP as PCP - General (Family Medicine)  Doretha Magallanes MD as MD (Anesthesiology)  Asif Luu DO as MD (Family Medicine - Sports Medicine)  Fredo Hughes MD as MD (Orthopedics)  Aiden Resendez PA as Assigned PCP  Barbie Christie, PhD LP as Psychologist (Licensed Mental Health)  Susana Pike APRN CNP as Referring Physician (Nurse Practitioner)  Raghu Parada MD as Assigned Rheumatology Provider  Susana Pike APRN  CNP as Assigned Neuroscience Provider  Susana Pike APRN CNP as Assigned Pain Medication Provider  Caitlin Cohen, PharmD as Pharmacist (Pharmacist)  Caitlin Cohen, PharmD as Assigned MTM Pharmacist  Leti Quiroz AuD as Audiologist (Audiology)  Daniel Plasencia MD as MD (Otolaryngology)  Zain Hernández MD as Resident (Student in organized health care education/training program)  Haim Quezada PsyD as Assigned Behavioral Health Provider  Daniel Plasencia MD as Assigned Surgical Provider    The following health maintenance items are reviewed in Epic and correct as of today:  Health Maintenance   Topic Date Due    DEPRESSION ACTION PLAN  Never done    HEPATITIS A IMMUNIZATION (1 of 2 - Risk 2-dose series) Never done    HEPATITIS B IMMUNIZATION (1 of 3 - 19+ 3-dose series) Never done    INFLUENZA VACCINE (1) 09/01/2023    COVID-19 Vaccine (4 - 2023-24 season) 09/01/2023    Pneumococcal Vaccine: Pediatrics (0 to 5 Years) and At-Risk Patients (6 to 64 Years) (2 of 2 - PCV) 11/25/2023    MEDICARE ANNUAL WELLNESS VISIT  12/08/2023    HEPATIC PANEL  12/08/2023    ANNUAL REVIEW OF HM ORDERS  12/08/2023    DEPRESSION 12 MO INDEX REPEAT PHQ-9  03/15/2024    URINE DRUG SCREEN  05/17/2024    LIPID  01/11/2025    PHQ-9  03/01/2025    GLUCOSE  12/08/2025    ADVANCE CARE PLANNING  12/08/2027    COLORECTAL CANCER SCREENING  05/09/2028    DTAP/TDAP/TD IMMUNIZATION (2 - Td or Tdap) 09/12/2031    HEPATITIS C SCREENING  Completed    HIV SCREENING  Completed    ZOSTER IMMUNIZATION  Completed    IPV IMMUNIZATION  Aged Out    HPV IMMUNIZATION  Aged Out    MENINGITIS IMMUNIZATION  Aged Out    RSV MONOCLONAL ANTIBODY  Aged Out    LUNG CANCER SCREENING  Discontinued     Review of Systems  Constitutional, HEENT, cardiovascular, pulmonary, gi and gu systems are negative, except as otherwise noted.     Objective    Exam  BP (!) 140/86 (BP Location: Right arm, Patient Position: Sitting, Cuff Size: Adult Large)   " Pulse 65   Temp 97.7  F (36.5  C) (Oral)   Resp 20   Ht 1.905 m (6' 3\")   Wt 128.8 kg (284 lb)   SpO2 97%   BMI 35.50 kg/m     Estimated body mass index is 35.5 kg/m  as calculated from the following:    Height as of this encounter: 1.905 m (6' 3\").    Weight as of this encounter: 128.8 kg (284 lb).    Physical Exam  GENERAL: alert and no distress  EYES: Eyes grossly normal to inspection, PERRL and conjunctivae and sclerae normal  HENT: ear canals and TM's normal, nose and mouth without ulcers or lesions  NECK: no adenopathy, no asymmetry, masses, or scars  RESP: lungs clear to auscultation - no rales, rhonchi or wheezes  CV: regular rate and rhythm, normal S1 S2, no S3 or S4, no murmur, click or rub, no peripheral edema  ABDOMEN: soft, nontender, no hepatosplenomegaly, no masses and bowel sounds normal  MS: no gross musculoskeletal defects noted, no edema  SKIN: no suspicious lesions or rashes  NEURO: Normal strength and tone, mentation intact and speech normal  PSYCH: mentation appears normal, affect normal/bright        3/1/2024   Mini Cog   Clock Draw Score 2 Normal   3 Item Recall 1 object recalled   Mini Cog Total Score 3     Signed Electronically by: JUANITA Curran CNP    "

## 2024-03-01 NOTE — PROGRESS NOTES
Prior to immunization administration, verified patients identity using patient s name and date of birth. Please see Immunization Activity for additional information.     Screening Questionnaire for Adult Immunization    Are you sick today?   No   Do you have allergies to medications, food, a vaccine component or latex?   No   Have you ever had a serious reaction after receiving a vaccination?   No   Do you have a long-term health problem with heart, lung, kidney, or metabolic disease (e.g., diabetes), asthma, a blood disorder, no spleen, complement component deficiency, a cochlear implant, or a spinal fluid leak?  Are you on long-term aspirin therapy?   No   Do you have cancer, leukemia, HIV/AIDS, or any other immune system problem?   No   Do you have a parent, brother, or sister with an immune system problem?   No   In the past 3 months, have you taken medications that affect  your immune system, such as prednisone, other steroids, or anticancer drugs; drugs for the treatment of rheumatoid arthritis, Crohn s disease, or psoriasis; or have you had radiation treatments?   No   Have you had a seizure, or a brain or other nervous system problem?   No   During the past year, have you received a transfusion of blood or blood    products, or been given immune (gamma) globulin or antiviral drug?   No   For women: Are you pregnant or is there a chance you could become       pregnant during the next month?   No   Have you received any vaccinations in the past 4 weeks?   No     Immunization questionnaire answers were all negative.      Patient instructed to remain in clinic for 15 minutes afterwards, and to report any adverse reactions.     Screening performed by Nora Langley MA on 3/1/2024 at 10:13 AM.

## 2024-03-02 LAB
ALBUMIN SERPL BCG-MCNC: 4.8 G/DL (ref 3.5–5.2)
ALP SERPL-CCNC: 129 U/L (ref 40–150)
ALT SERPL W P-5'-P-CCNC: 29 U/L (ref 0–70)
ANION GAP SERPL CALCULATED.3IONS-SCNC: 10 MMOL/L (ref 7–15)
AST SERPL W P-5'-P-CCNC: 29 U/L (ref 0–45)
BILIRUB SERPL-MCNC: 0.6 MG/DL
BUN SERPL-MCNC: 16.8 MG/DL (ref 6–20)
CALCIUM SERPL-MCNC: 9.1 MG/DL (ref 8.6–10)
CHLORIDE SERPL-SCNC: 101 MMOL/L (ref 98–107)
CREAT SERPL-MCNC: 1.02 MG/DL (ref 0.67–1.17)
DEPRECATED HCO3 PLAS-SCNC: 27 MMOL/L (ref 22–29)
EGFRCR SERPLBLD CKD-EPI 2021: 86 ML/MIN/1.73M2
GLUCOSE SERPL-MCNC: 112 MG/DL (ref 70–99)
POTASSIUM SERPL-SCNC: 4.5 MMOL/L (ref 3.4–5.3)
PROT SERPL-MCNC: 8 G/DL (ref 6.4–8.3)
SODIUM SERPL-SCNC: 138 MMOL/L (ref 135–145)
VIT D+METAB SERPL-MCNC: 26 NG/ML (ref 20–50)

## 2024-03-22 ENCOUNTER — ANCILLARY PROCEDURE (OUTPATIENT)
Dept: MRI IMAGING | Facility: CLINIC | Age: 59
End: 2024-03-22
Attending: OTOLARYNGOLOGY
Payer: COMMERCIAL

## 2024-03-22 DIAGNOSIS — H93.12 TINNITUS, LEFT: ICD-10-CM

## 2024-03-22 DIAGNOSIS — H90.3 SNHL (SENSORY-NEURAL HEARING LOSS), ASYMMETRICAL: ICD-10-CM

## 2024-03-22 PROCEDURE — 70553 MRI BRAIN STEM W/O & W/DYE: CPT | Mod: GC | Performed by: RADIOLOGY

## 2024-03-22 PROCEDURE — A9585 GADOBUTROL INJECTION: HCPCS | Performed by: RADIOLOGY

## 2024-03-22 RX ORDER — GADOBUTROL 604.72 MG/ML
15 INJECTION INTRAVENOUS ONCE
Status: COMPLETED | OUTPATIENT
Start: 2024-03-22 | End: 2024-03-22

## 2024-03-22 RX ADMIN — GADOBUTROL 13 ML: 604.72 INJECTION INTRAVENOUS at 11:56

## 2024-03-25 ENCOUNTER — MYC MEDICAL ADVICE (OUTPATIENT)
Dept: PALLIATIVE MEDICINE | Facility: CLINIC | Age: 59
End: 2024-03-25
Payer: COMMERCIAL

## 2024-03-25 DIAGNOSIS — M51.369 DDD (DEGENERATIVE DISC DISEASE), LUMBAR: ICD-10-CM

## 2024-03-25 DIAGNOSIS — M47.817 SPONDYLOSIS WITHOUT MYELOPATHY OR RADICULOPATHY, LUMBOSACRAL REGION: ICD-10-CM

## 2024-03-25 DIAGNOSIS — M25.50 MULTIPLE JOINT PAIN: ICD-10-CM

## 2024-03-25 DIAGNOSIS — F11.20 UNCOMPLICATED OPIOID DEPENDENCE (H): ICD-10-CM

## 2024-03-25 DIAGNOSIS — M05.79 RHEUMATOID ARTHRITIS INVOLVING MULTIPLE SITES WITH POSITIVE RHEUMATOID FACTOR (H): ICD-10-CM

## 2024-03-25 DIAGNOSIS — F11.90 CHRONIC, CONTINUOUS USE OF OPIOIDS: ICD-10-CM

## 2024-03-25 DIAGNOSIS — M45.7 ANKYLOSING SPONDYLITIS OF LUMBOSACRAL REGION (H): ICD-10-CM

## 2024-03-25 DIAGNOSIS — M54.59 LUMBAR FACET JOINT PAIN: ICD-10-CM

## 2024-03-26 RX ORDER — BUPRENORPHINE 8 MG/1
8 TABLET SUBLINGUAL 3 TIMES DAILY
Qty: 90 TABLET | Refills: 0 | Status: SHIPPED | OUTPATIENT
Start: 2024-03-26 | End: 2024-04-29

## 2024-03-26 NOTE — TELEPHONE ENCOUNTER
Signed Prescriptions:                        Disp   Refills    buprenorphine (SUBUTEX) 8 MG SUBL sublingu*90 tab*0        Sig: Place 1 tablet (8 mg) under the tongue 3 times daily           Fill/start 03/26/24.  30 day script.  Authorizing Provider: SUSANA PETTY        Reviewed [place]  March 26, 2024- no concerning fills.    Susana GRANT, RN CNP, FNP  [facility]

## 2024-03-26 NOTE — TELEPHONE ENCOUNTER
Medication refill information reviewed.     Due date for buprenorphine (SUBUTEX) 8 MG SUBL sublingual tablet  is 03/26/24     Prescriptions prepped for review.     Will route to provider.

## 2024-03-26 NOTE — TELEPHONE ENCOUNTER
Please process a refill of buprenorphine (SUBUTEX) 8 MG SUBL sublingual tablet  to     [id number] - [place], [place] - [address] [facility] [address] [facility] [address]  [address]   [place] [address]  Phone: [phone] Fax: [phone]

## 2024-03-26 NOTE — TELEPHONE ENCOUNTER
Received call from patient requesting refill(s) of buprenorphine (SUBUTEX) 8 MG SUBL sublingual tablet    Last dispensed from pharmacy on 2/19/2024.    Patient's last office/virtual visit by prescribing provider on 2/19/2024.  Next office/virtual appointment scheduled for 5/20/2024.    Last urine drug screen date 5/17/2023.  Current opioid agreement on file (completed within the last year) Yes Date of opioid agreement: 5/17/2023.    E-prescribe to:    [id number] - [place], [place] - [address] [facility]    Will route to nursing [place] for review and preparation of prescription(s).

## 2024-04-03 ENCOUNTER — TELEPHONE (OUTPATIENT)
Dept: PALLIATIVE MEDICINE | Facility: CLINIC | Age: 59
End: 2024-04-03
Payer: COMMERCIAL

## 2024-04-03 NOTE — TELEPHONE ENCOUNTER
Voice message left on 2024 at 5:17 PM    Reason for Call:  Other call back    Detailed comments: Per patient's voice message, he has a  to go to on the  and needs to try to reschedule his [injection] appointment and noted the sooner the better if possible.    Patient requested to be called back.      Phone Number Patient can be reached at: Cell number on file:    Telephone Information:   Mobile [phone]       Best Time: not indicated in voice message    Can we leave a detailed message on this number?  not indicated in voice message    Voice message retrieved on 4/3/2024 at 7:52 AM by Luh Naik

## 2024-04-03 NOTE — TELEPHONE ENCOUNTER
Pt is scheduled on 4/11, when speaking with pt he informed me that he no longer needed to r/s            [facility]  Complex   [facility]  Pain Management

## 2024-04-09 ENCOUNTER — VIRTUAL VISIT (OUTPATIENT)
Dept: PSYCHIATRY | Facility: CLINIC | Age: 59
End: 2024-04-09
Attending: PSYCHIATRY & NEUROLOGY
Payer: COMMERCIAL

## 2024-04-09 DIAGNOSIS — F43.10 PTSD (POST-TRAUMATIC STRESS DISORDER): ICD-10-CM

## 2024-04-09 DIAGNOSIS — F41.0 GENERALIZED ANXIETY DISORDER WITH PANIC ATTACKS: ICD-10-CM

## 2024-04-09 DIAGNOSIS — F51.05 INSOMNIA DUE TO OTHER MENTAL DISORDER: ICD-10-CM

## 2024-04-09 DIAGNOSIS — F99 INSOMNIA DUE TO OTHER MENTAL DISORDER: ICD-10-CM

## 2024-04-09 DIAGNOSIS — F33.1 MAJOR DEPRESSIVE DISORDER, RECURRENT EPISODE, MODERATE (H): Primary | ICD-10-CM

## 2024-04-09 DIAGNOSIS — F41.1 GENERALIZED ANXIETY DISORDER WITH PANIC ATTACKS: ICD-10-CM

## 2024-04-09 PROCEDURE — 99214 OFFICE O/P EST MOD 30 MIN: CPT | Mod: 95

## 2024-04-09 RX ORDER — QUETIAPINE FUMARATE 150 MG/1
150 TABLET, FILM COATED ORAL AT BEDTIME
Qty: 90 TABLET | Refills: 0 | Status: SHIPPED | OUTPATIENT
Start: 2024-05-09 | End: 2024-04-30 | Stop reason: DRUGHIGH

## 2024-04-09 RX ORDER — PRAZOSIN HYDROCHLORIDE 2 MG/1
2 CAPSULE ORAL AT BEDTIME
Qty: 90 CAPSULE | Refills: 0 | Status: SHIPPED | OUTPATIENT
Start: 2024-04-09 | End: 2024-05-30

## 2024-04-09 RX ORDER — FLUOXETINE 40 MG/1
80 CAPSULE ORAL DAILY
Qty: 180 CAPSULE | Refills: 0 | Status: SHIPPED | OUTPATIENT
Start: 2024-04-09 | End: 2024-05-30

## 2024-04-09 RX ORDER — OXYCODONE HYDROCHLORIDE 5 MG/1
5 TABLET ORAL EVERY 12 HOURS PRN
Qty: 6 TABLET | Refills: 0 | Status: CANCELLED
Start: 2024-04-09 | End: 2024-04-12

## 2024-04-09 RX ORDER — LANOLIN ALCOHOL/MO/W.PET/CERES
3 CREAM (GRAM) TOPICAL EVERY EVENING
Qty: 90 TABLET | Refills: 0 | Status: SHIPPED | OUTPATIENT
Start: 2024-04-09 | End: 2024-05-30

## 2024-04-09 ASSESSMENT — PAIN SCALES - GENERAL: PAINLEVEL: SEVERE PAIN (7)

## 2024-04-09 NOTE — PROGRESS NOTES
[facility]  MEDICAL PROGRESS NOTE       Virtual Visit Details    Type of service:  Video Visit   Video Start Time:  9:50 AM  Video End Time: 10:20 AM    Originating Location (pt. Location): Home    Distant Location (provider location):  On-site  Platform used for Video Visit: [facility]      CARE TEAM:    PCP- Minda Monzon  Therapist-  None   Rheum- MD Karma and Pain-JUANITA Pike      Jamison is a 58 year old who uses the pronouns him, his.        Diagnoses     Major depressive disorder, recurrent, moderate  Generalized anxiety disorder, with panic  PTSD     Medical Diagnoses:  Chronic pain  Rheumatoid arthritis  Ankylosing spondylitis (HLA-B27 positive)  Lumbar degenerative disc disease  Hypertension  Hyperlipidemia     Assessment     Jamison is a 58 year old  man who is seen today for follow up for  MDD, JEFF, and PTSD in the context of past trauma, strained relationship with mother,  and significant medical co-morbidities including rheumatoid arthritis and chronic pain. Significant symptoms have included depressed mood, guilt, anhedonia, poor sleep, and avolition. Jamison is currently taking medical cannabis which might play a role in mood changes.      Today, Jamison reports that he is struggling more with intrusive memories of childhood trauma and his strained relationship with his mother. Symptoms have worsened in the context of losing his father-in-law recently and noticing how caring/supportive his wife's family has been towards one another after their loss as opposed to the lack of support that he had from his mother after he lost his sister. He has been having more feelings of guilt and loneliness. He wants to repair the relationship with his mother before she passes away but is frustrated that his mother is not open to that repair. He wishes that his mother would be more present in his life.  He notes that anxiety is well controlled. He  denies panic attacks. He has continued to struggle with pain, insomnia, and impaired memory that has contributed to him forgetting to schedule psychotherapy appointments. Memory issues have been ongoing for a long time and did not change when he stopped using medical cannabis. He denies SI/HI and does not appear to be at imminent risk of harm to himself or others. He notes that the best way to support him would be psychotherapy. Jamison has missed several psychotherapy sessions and is interested in a new referral. Discussed to put in a new referral for psychotherapy. Several factors could be contributing to memory impairment including depression and medications (opioids). Discussed to refer Jamison for a neuropsychiatric evaluation to assess memory impairment. Quetiapine was increased from 25 mg to 150 mg in 10/2023 when Jamison was struggling more with depression but he has been splitting the pill because he forgot about the increase. He has been tolerating medications well. Discussed to increase the quetiapine back to 150 mg daily.        Future considerations:  -Levomilnacipran  -TCAs  - Trauma-focused therapy  -Follow up on memory concerns- cognitive screen; consider a neuropsychological assessment      Psychotropic Drug Interactions:  [PSYCHCLINICDDI]  ADDITIVE SEROTONERGIC: fluoxetine, buprenorphine, fentanyl, oxycodone  ADDITIVE QTc: fluoxetine, quetiapine, buprenorphine, fentanyl, oxycodone  ADDITIVE ORTHOSTASIS: prazosin, buprenorphine, fentanyl, oxycodone   ADDITIVE CNS/RESPIRATORY DEPRESSION: buprenorphine, quetiapine, fentanyl, oxycodone   Management: routine monitoring    [place] was [place] today: indicates that controlled prescriptions have been filled as prescribed    Risk Statements:   Treatment Risk- Risks, benefits, alternatives and potential adverse effects have been discussed and are understood.   Safety Risk-Les did not appear to be an imminent safety risk to self or others.     Plan     1) Medications:   -  Continue quetiapine (Seroquel) 150 mg at bedtime    - Continue fluoxetine (PROZAC) 80 mg daily  - Continue prazosin (MINIPRESS) 2 mg at bedtime   - Continue melatonin 3 mg 3-4 hours before desired bedtime     Light box use      Other:  - buprenorphine   - Methotrexate  - Oxycodone (per pain clinic for pain flare short term)  - Fentanyl   - Medical cannabis (per pain clinic)     2) Therapy- Recommend- Would also benefit from trauma-informed therapy      3) Next Due   Labs - AP labs reviewed, next due 1/2025  EKG - last EKG 12/8/2022. QTcH 403 ms. Sinus Rhythm-Incomplete right bundle branch block. -Left atrial enlargement.   Rating scales- AIMS, will complete during next in person visit     4) Referral -Psychotherapy         -Neuropsych eval     5) Other: -Previously SW for resources (55+ group? Financial resources? Transportation resources? )     6) RTC: 4 weeks       Pertinent Background                                                   [most recent eval 07/11/23]     Copied from prior notes and reviewed with patient    Originally, diagnosed with depression and anxiety in 2015 but first experienced symptoms of depression in middle school. He most recently had a significant worsening of symptoms in 2017 after he was diagnosed with rheumatoid arthritis, ankylosing spondylitis ended up with a knee injury and was no longer able to work or run which he used as his primary coping mechanism.     Pertinent Items Include: suicidal ideation, SIB [cutting], multiple psychotropic trials , severe med reaction, psych hosp (<3), SUBSTANCE USE: alcohol, substance use treatment  and Major Medical Problems (Rheumatoid Arthritis and Ankylosing Spondylitis)     Subjective   Les was last seen on 12/19/2023 during which the dose of prazosin was increased.   Since last visit Les notes:  Has been doing okay.  Struggling   2 weeks ago wife's father passed away  A few days later wife's mother was hospitalized  Sad about the loss- liked him a  "lot, was a good role model, feels upset because does not have a relationship with his own parents- this adds to the depression - some guilt, wishes for things \"that would never happen. Mom would never change\". Wishes she would be there, that he would be able to talk to her, that she would be part of his life, feels alone, can't talk to mom, tries to talk to biological father but he is not big on answering texts , he reads them, will call once in a while, not like a really close relationship, treats him like a , misses kids- son in [place], daughter in [place], daughter visits once a week   Thinks about the few good times they had when he was a kid, most of the time was sad growing up, was scared, was depressed, did have some good moments- mother would love him then hate him   Wants to repair relationship before she passes  He is the oldest of 5- youngest sister got killed a few yrs back while at work- got scalded to death by hot water and steam- lost mom since then because mom kept focusing on her own grief and did not consider what they as her children were going through after the loss- got sad when they went to the hospital after father in law passed and saw how caring and supportive his wife's family was towards each other after their loss  Did not get comfort from mom when they suffered losses- mom made it about her and did not ask how they were doing after losing sister- mom was never there     With recent  he has been thinking more about the past - got him thinking, does not want to keep fighting about stupid stuff and put it aside and keep going - would like to make amends with mom    Notes he has been bringing up his own insecurities    Best support- would like to have someone to talk to- worse when has nobody to talk to    No longer in therapy    Having issues with memory    Something came up when could not schedule appointment so has not been in therapy since then       Anxiety- " has not been anxious at all, pretty well under control     Panic attacks- none    Sleep-  not great, cat naps here and there    SI/HI denies     Meds-  Has divided quetiapine 150 into 4- did not have any medication for like 2 months- then suddenly it came in the mail at a higher dose- open to increasing it back to 150 mg. Once in a while gets nausea. Happens throughout the day at different times. Taking PRN antinausea meds. Some constipation. Was prescribed meds for constipation by PCP which helps.     Pain-  no change- meds not helping - getting nerves cauterized in the next month or two, has appointment with pain     Memory issues have been ongoing for a long time, was still having memory issues when not taking medical cannabis         Recent Psych Symptoms:   Elevated:  none  Psychosis:  none    Current Social History:  Financial/occupational: On disability. Was planning to complete studies at [employer] for a degree in psychology- has a couple of credits left to complete degree; unable to work due to diagnosis of rheumatoid arthritis and ankylosing spondylitis   Living situation (partner, children, pets, etc): [place], house with wife and daughter 25 yo at home, son is in [place], two dogs - lost one  Social/spiritual support: Talks to  every two weeks, not many friends, family is supportive, likes walking and hiking  Feels safe at home: Yes    Pertinent Substance Use:   Alcohol: No   Cannabis: Yes: On medical cannabis-  2-3 gummies daily   Tobacco: No  Caffeine:  No   Opioids: No   Narcan Kit current: Yes  Other substances: none    Medical Review of Systems:   Lightheadedness/orthostasis: None  Headaches: less, happens once in a while   GI: None     Mental Status Exam     Alertness: alert  and oriented  Appearance: casually groomed  Behavior/Demeanor: cooperative, pleasant and calm, with fair  eye contact   Speech: normal and regular rate and rhythm  Language: no obvious problem  Psychomotor: normal  "or unremarkable  Mood:   \"depressed\"  Affect: restricted and appropriate; congruent to: mood- yes; content: no   Thought Content:  Reports none;  Denies suicidal ideation, violent ideation and delusions   Perception:  Reports none;  Denies hallucinations  Insight: adequate  Judgment: adequate for safety  Cognition: does  appear grossly intact; formal cognitive testing was not done  Gait and Station: N/A (telehealth)     Past Psych Med Trials     Copied from prior note and reviewed with patient    Medication  Dose   (mg) Effect  Dates of Use   Fluoxetine 40-80 Felt like was initially helpful 2016 - 2017 2022-2023   Sertraline 100 Effective initially, eventually self discontinued as not helpful 2018 - 7/2020   Duloxetine 30 BID Used to treat nerve pain; felt weird on it 2017   Bupropion  BID ineffective 2017 - 2019   Nortriptyline 50 For pain 2017 -  3/2019   Mirtazapine   Dissociated for entire first week after taking  Per MTM on 5/15/23: \"\"some kind of psychosis\". States that he was also taking oxycodone, alprazolam, fentanyl patches at the time\" 2012   Trazodone 50   2013             Hydroxyzine 25 QID prn For anxiety and panic attacks; not effective for severe anxiety 2015 - 2017   Buspirone 15 TID   2016 - 2017   Gabapentin 100 TID   2013 - 2016   Alprazolam 0.5 TID For anxiety 6775-9716   Diazepam 2 BID For anxiety 2016   Lorazepam 1 TID For anxiety 2016   Quetiapine  25-50   4901-2370   Clonazepam 1 For anxiety 2016         Treatment Course and Davis Events Since   July 2023     -07/2023- Transfer of care-no med changes  -08/2023- Taper down and discontinue liothyronine  -10/2023- More depressed. Increased quetiapine from 25 mg at bedtime to 150 mg at bedtime; started melatonin  - 12/2023- Increased the dose of prazosin to 2 mg at bedtime   - 4/2024- Had been splitting quetiapine 150 mg into 4 because he was not sure that it was increased. Recommended to take quetiapine 150 mg at bedtime.        Vitals "     There were no vitals taken for this visit.  Pulse Readings from Last 3 Encounters:   03/01/24 65   01/11/24 79   08/09/23 72     Wt Readings from Last 3 Encounters:   03/01/24 128.8 kg (284 lb)   12/08/22 131.5 kg (290 lb)   05/02/22 122.7 kg (270 lb 8 oz)     BP Readings from Last 3 Encounters:   03/01/24 (!) 140/86   01/11/24 117/75   08/09/23 125/79         Medical History     ALLERGIES: Mirtazapine, Hydrocodone, Mirtazapine, and Ms contin [morphine]    Patient Active Problem List   Diagnosis    CARDIOVASCULAR SCREENING; LDL GOAL LESS THAN 160    Obesity, Class I, BMI 30-34.9    Tear meniscus knee, right, initial encounter    Rheumatoid arthritis involving multiple sites, unspecified rheumatoid factor presence    Chronic pain    Right-sided thoracic back pain, unspecified chronicity    Generalized anxiety disorder    Panic attack    Ankylosing spondylitis (H)    Moderate major depression (H)    Immunocompromised (H24)    Essential hypertension with goal blood pressure less than 140/90    Suicidal ideation    Major depressive disorder, recurrent episode, severe with mixed features (H)    Chronic back pain greater than 3 months duration    Contact dermatitis and eczema    Dermatitis    Drug dependence (H)    Encounter for screening colonoscopy    Esophageal reflux    Hematuria    Hyperlipidemia    Lumbar radiculopathy    Plantar fascial fibromatosis    Sprain of sacroiliac region    Overweight    Nonintractable migraine    Narcotic abuse, continuous (H)    Morbid obesity (H)    Chronic, continuous use of opioids        Medications     Current Outpatient Medications   Medication Sig Dispense Refill    atenolol (TENORMIN) 25 MG tablet Take 1 tablet (25 mg) by mouth daily 90 tablet 3    atorvastatin (LIPITOR) 20 MG tablet Take 1 tablet (20 mg) by mouth daily 90 tablet 0    buprenorphine (SUBUTEX) 8 MG SUBL sublingual tablet Place 1 tablet (8 mg) under the tongue 3 times daily Fill/start 03/26/24.  30 day script.  90 tablet 0    docusate sodium (COLACE) 100 MG capsule Take 1 capsule (100 mg) by mouth 2 times daily 60 capsule 3    FLUoxetine (PROZAC) 40 MG capsule Take 2 capsules (80 mg) by mouth daily 180 capsule 0    folic acid (FOLVITE) 1 MG tablet Take 3 tablets (3,000 mcg) by mouth daily 300 tablet 2    medical cannabis (Patient's own supply) See Admin Instructions (The purpose of this order is to document that the patient reports taking medical cannabis.  This is not a prescription, and is not used to certify that the patient has a qualifying medical condition.)     Pills PRN   Lozenges PRN      melatonin 3 MG tablet Take 1 tablet (3 mg) by mouth every evening . Take 3-4 hours before your desired bedtime. 90 tablet 0    methotrexate 2.5 MG tablet Take 8 tablets (20 mg) by mouth every 7 days 96 tablet 1    naloxone (NARCAN) 4 MG/0.1ML nasal spray Spray 1 spray (4 mg) into one nostril alternating nostrils as needed for opioid reversal every 2-3 minutes until assistance arrives 0.2 mL 1    ondansetron (ZOFRAN ODT) 4 MG ODT tab Take 1 tablet (4 mg) by mouth every 8 hours as needed for nausea 30 tablet 3    prazosin (MINIPRESS) 2 MG capsule Take 1 capsule (2 mg) by mouth at bedtime 90 capsule 0    QUEtiapine Fumarate 150 MG TABS Take 150 mg by mouth at bedtime Please schedule an appointment for future refills. Call [phone] . 90 tablet 0    tiZANidine (ZANAFLEX) 4 MG tablet Take 1 tablet (4 mg) by mouth 3 times daily as needed for muscle spasms 3 month supply 225 tablet 1    tofacitinib (XELJANZ XR) 11 MG 24 hr tablet Take 1 tablet (11 mg) by mouth daily Hold for signs of infection, then seek medical attention. 30 tablet 5        Labs and Data         9/18/2023    10:31 AM 10/24/2023     9:47 AM 4/9/2024     9:30 AM   PROMIS-10 Total Score w/o Sub Scores   PROMIS TOTAL - SUBSCORES 14 15 16         6/12/2023     8:05 PM 7/12/2023     5:00 PM 11/20/2023     3:06 PM   CAGE-AID Total Score   Total Score 0 0 4   Total Score  MyChart 0 (A total score of 2 or greater is considered clinically significant)           1/4/2024    10:44 AM 3/1/2024     8:27 AM 4/9/2024     9:28 AM   PHQ-9 SCORE   PHQ-9 Total Score MyChart 15 (Moderately severe depression) 13 (Moderate depression) 16 (Moderately severe depression)   PHQ-9 Total Score 15 13 16         6/12/2023     8:06 PM 7/12/2023     5:00 PM 11/20/2023     3:06 PM   JEFF-7 SCORE   Total Score 8 (mild anxiety)     Total Score 8 5 3     Recent Labs   Lab Test 03/01/24  1016 01/11/24  1106 12/08/22  1427 12/08/22  1425   *  --   --  96   A1C  --  5.5 5.5  --      Recent Labs   Lab Test 01/11/24  1106 12/08/22  1425   CHOL 185 224*   TRIG 66 230*   * 153*   HDL 41 25*     Recent Labs   Lab Test 03/01/24  1016 01/11/24  1106 05/17/23  1007 12/08/22  1425   AST 29 40   < > 32   ALT 29 49   < > 49   ALKPHOS 129  --   --  112    < > = values in this interval not displayed.     Recent Labs   Lab Test 01/11/24  1106 05/17/23  1007 12/08/22  1425 08/31/22  1405 10/11/21  1123   WBC 6.9 6.4 7.7 6.0 7.0   ANEUTAUTO  --   --  5.3 3.2 4.4   HGB 14.5 15.5 15.6 15.1 16.2    228 225 213 232          PROVIDER: Zain Hernández MD    Level of Medical Decision Making:   - At least 1 chronic problem that is not stable  - Engaged in prescription drug management during visit (discussed any medication benefits, side effects, alternatives, etc.)           Psychiatry Individual Psychotherapy Note   Psychotherapy start time -   Psychotherapy end time -   Date last reviewed with patient -   Subjective: This supportive psychotherapy session addressed issues related to goals of therapy and current psychosocial stressors. Patient's reaction: Action in the context of mental status appropriate for ambulatory setting.  Progress: action  Interactive complexity indicated? No  Plan: RTC in timeframe noted above  Psychotherapy services during this visit included myself and the patient.   Treatment Plan       SYMPTOMS; PROBLEMS   MEASURABLE GOALS;    FUNCTIONAL IMPROVEMENT / GAINS INTERVENTIONS DISCHARGE CRITERIA   Depression: insomnia and avolition   Choose at least one enjoyable activity and start behavioral activation and improve sleep hygiene Supportive / psychodynamic marked symptom improvement         Patient staffed in clinic with Dr. Kirby who will sign the note.  Supervisor is Dr. Hunter.

## 2024-04-09 NOTE — NURSING NOTE
Is the patient currently in the state of [place]? YES    Visit mode:VIDEO    If the visit is dropped, the patient can be reconnected by: VIDEO VISIT: Text to cell phone:   Telephone Information:   Mobile [phone]       Will anyone else be joining the visit? NO  (If patient encounters technical issues they should call [phone])    How would you like to obtain your AVS? MyChart    Are changes needed to the allergy or medication list? No  Patient declined individual medication review by support staff because nothing has changed.     Patient denies any changes since echeck-in   regarding medication and allergies and states all information entered during echeck-in remains accurate.     Are refills needed on medications prescribed by this physician?     Reason for visit: BERTO LIU

## 2024-04-09 NOTE — PATIENT INSTRUCTIONS
It was nice seeing you today     Treatment Plan Today:      1) Medications  - Take quetiapine (Seroquel) 1 tablet (150 mg) at bedtime  -Continue the other medications without changes      2) Psychotherapy and neuropsychological assessment referrals have been placed for you.  If you don't hear from a representative within 2 business days, please call [phone].    3) Please return to clinic in 1 month      4) Crisis numbers are below and clinic after hours number is [phone]      **For crisis resources, please see the information at the end of this document**   Patient Education    Thank you for coming to the [facility] [place] [facility].     Lab Testing:  If you had lab testing today and your results are reassuring or normal they will be mailed to you or sent through [employer] within 7 days. If the lab tests need quick action we will call you with the results. The phone number we will call with results is # [phone]. If this is not the best number please call our clinic and change the number.     Medication Refills:  If you need any refills please call your pharmacy and they will contact us. Our fax number for refills is [phone].   Three business days of notice are needed for general medication refill requests.   Five business days of notice are needed for controlled substance refill requests.   If you need to change to a different pharmacy, please contact the new pharmacy directly. The new pharmacy will help you get your medications transferred.     Contact Us:  Please call [phone] during business hours (8-5:00 M-F).   If you have medication related questions after clinic hours, or on the weekend, please call [phone].     Financial Assistance [phone]   Medical Records [phone]       MENTAL HEALTH CRISIS RESOURCES:  For a emergency help, please call 911 or go to the nearest Emergency Department.     Emergency Walk-In Options:   EmPATH Unit @  [facility] ([place]): [phone] - Specialized mental health emergency area designed to be calming  [facility] ([place]): [phone]  [facility] ([place]): [phone]  [facility] ([place]): [phone]    [facility] Crisis Information:   [place]: [phone]  [place]: [phone]  [place] ([place]) - Adult: [phone]     Child: [phone]  [place] - Adult: [phone]     Child: [phone]  [place]: [phone]  List of all [facility] resources:   [email or web address]-contacts.[email or web address]    National Crisis Information:   Crisis Text Line: Text  [place]  to 219395  Suicide & Crisis Lifeline: 988  National Suicide Prevention Lifeline: 4-654-620-TALK (1-425.949.2388)       For online chat options, visit https://suicidepreventionlifeline.org/chat/  Poison Control Center: 1-757.902.3438  Trans Lifeline: 1-671.251.4676 - Hotline for transgender people of all ages  The Tu Project: 8-131-114-1517 - Hotline for LGBT youth     For Non-Emergency Support:   Fast Tracker: Mental Health & Substance Use Disorder Resources -   https://www.Mediasurfacen.org/

## 2024-04-11 ENCOUNTER — RADIOLOGY INJECTION OFFICE VISIT (OUTPATIENT)
Dept: PALLIATIVE MEDICINE | Facility: CLINIC | Age: 59
End: 2024-04-11
Attending: NURSE PRACTITIONER
Payer: COMMERCIAL

## 2024-04-11 VITALS — SYSTOLIC BLOOD PRESSURE: 129 MMHG | DIASTOLIC BLOOD PRESSURE: 87 MMHG | OXYGEN SATURATION: 96 % | HEART RATE: 52 BPM

## 2024-04-11 DIAGNOSIS — M54.59 LUMBAR FACET JOINT PAIN: ICD-10-CM

## 2024-04-11 DIAGNOSIS — M47.817 SPONDYLOSIS WITHOUT MYELOPATHY OR RADICULOPATHY, LUMBOSACRAL REGION: Primary | ICD-10-CM

## 2024-04-11 DIAGNOSIS — M06.9 RHEUMATOID ARTHRITIS INVOLVING MULTIPLE SITES, UNSPECIFIED WHETHER RHEUMATOID FACTOR PRESENT (H): ICD-10-CM

## 2024-04-11 DIAGNOSIS — G89.18 POST PROCEDURE DISCOMFORT: ICD-10-CM

## 2024-04-11 DIAGNOSIS — M45.8 ANKYLOSING SPONDYLITIS OF SACRAL REGION (H): ICD-10-CM

## 2024-04-11 DIAGNOSIS — M51.369 DDD (DEGENERATIVE DISC DISEASE), LUMBAR: ICD-10-CM

## 2024-04-11 PROCEDURE — 99152 MOD SED SAME PHYS/QHP 5/>YRS: CPT | Performed by: PAIN MEDICINE

## 2024-04-11 PROCEDURE — 64636 DESTROY L/S FACET JNT ADDL: CPT | Mod: 50 | Performed by: PAIN MEDICINE

## 2024-04-11 PROCEDURE — 99153 MOD SED SAME PHYS/QHP EA: CPT | Performed by: PAIN MEDICINE

## 2024-04-11 PROCEDURE — 64635 DESTROY LUMB/SAC FACET JNT: CPT | Mod: 50 | Performed by: PAIN MEDICINE

## 2024-04-11 RX ORDER — OXYCODONE HYDROCHLORIDE 5 MG/1
5 TABLET ORAL EVERY 12 HOURS PRN
Qty: 6 TABLET | Refills: 0 | Status: SHIPPED | OUTPATIENT
Start: 2024-04-11 | End: 2024-04-11

## 2024-04-11 RX ORDER — KETOROLAC TROMETHAMINE 30 MG/ML
15 INJECTION, SOLUTION INTRAMUSCULAR; INTRAVENOUS ONCE
Status: COMPLETED | OUTPATIENT
Start: 2024-04-11 | End: 2024-04-11

## 2024-04-11 RX ORDER — OXYCODONE HYDROCHLORIDE 5 MG/1
5 TABLET ORAL EVERY 12 HOURS PRN
Qty: 6 TABLET | Refills: 0 | Status: SHIPPED | OUTPATIENT
Start: 2024-04-11 | End: 2024-04-19

## 2024-04-11 RX ORDER — FENTANYL CITRATE 50 UG/ML
12.5-5 INJECTION, SOLUTION INTRAMUSCULAR; INTRAVENOUS EVERY 5 MIN PRN
Status: ACTIVE | OUTPATIENT
Start: 2024-04-11 | End: 2024-04-12

## 2024-04-11 RX ADMIN — FENTANYL CITRATE 25 MCG: 50 INJECTION, SOLUTION INTRAMUSCULAR; INTRAVENOUS at 11:13

## 2024-04-11 RX ADMIN — FENTANYL CITRATE 25 MCG: 50 INJECTION, SOLUTION INTRAMUSCULAR; INTRAVENOUS at 10:37

## 2024-04-11 RX ADMIN — FENTANYL CITRATE 25 MCG: 50 INJECTION, SOLUTION INTRAMUSCULAR; INTRAVENOUS at 10:58

## 2024-04-11 RX ADMIN — KETOROLAC TROMETHAMINE 15 MG: 30 INJECTION, SOLUTION INTRAMUSCULAR; INTRAVENOUS at 11:20

## 2024-04-11 ASSESSMENT — PAIN SCALES - GENERAL
PAINLEVEL: EXTREME PAIN (9)
PAINLEVEL: SEVERE PAIN (6)

## 2024-04-11 NOTE — NURSING NOTE
Discharge Information    IV Discontiued Time:  1140    Amount of Fluid Infused:  100 ccs    Discharge Criteria = When patient returns to baseline or as per MD order    Consciousness:  Pt is fully awake    Circulation:  BP +/- 20% of pre-procedure level    Respiration:  Patient is able to breathe deeply    O2 Sat:  Patient is able to maintain O2 Sat >92% on room air    Activity:  Moves 4 extremities on command    Ambulation:  Patient is able to stand and walk or stand and pivot into wheelchair    Dressing:  Clean/dry or No Dressing    Notes:   Discharge instructions and AVS given to patient    Patient meets criteria for discharge?  YES    Admitted to PCU?  No    Responsible adult present to accompany patient home?  Yes    Signature/Title:    Elana Brizuela RN  RN Care Coordinator  [facility]

## 2024-04-11 NOTE — PATIENT INSTRUCTIONS
[facility]   Radiofrequency Ablation (RFA) Discharge Instructions     Today you saw:  Dr. Aguilar Holley    You should anticipate procedural pain for up to 2 weeks.   You may receive a prescription for pain medication and you should take this as directed.   It may take up to 8 weeks to receive relief from the RFA   Follow up with your provider in 6-8 weeks.   If you received sedation before, during or after your procedure, for the next 24 hours you shall NOT:    -Drive    -Operate machinery    -Drink alcohol    -Sign any legal documents   You may resume your normal diet   You may resume your regular medications after the procedure   If you were holding your blood thinning medication, please restart taking it: N/A  Be cautious with walking. Numbness and/or weakness in the lower extremities may occur for up to 6-8 hours due to effect of local anesthetic  Avoid strenuous activity for the first 24 hours   You may resume your regular activities after 24 hours   You may shower, however no swimming, tub baths or hot tubs for 24 hours following your procedure   You may use ice packs 10-15 minutes three to four times a day at the injection site for comfort   Do not use heat to painful areas for 6 to 8 hours. This will give the local anesthetic time to wear off and prevent you from accidentally burning your skin.   Unless you have been directed to avoid the use of anti-inflammatory medications (NSAIDS), you may use medications such as ibuprofen, Aleve or Tylenol for pain control if needed.   If you experience any of the following, call the Pain Clinic during work hours (Monday through Friday 8 am-4:30 pm) at [phone] or the Provider Line after hours at [phone]:   -Fever over 100 degree F    -Swelling, bleeding, redness, drainage, warmth at the injection site    -Progressive weakness or numbness in your legs    -Loss of bowel or bladder function   -Unusual new onset of pain that is not  improving

## 2024-04-11 NOTE — NURSING NOTE
22 gauge Peripheral IV inserted into left anticubital - attempts: 1    Elana Brizuela RN  [facility] [place] [facility] [place]  [phone]

## 2024-04-11 NOTE — NURSING NOTE
MD Time IN: 1030   Sedation start time:  1036  Sedation end time:  1115    Medications given: fentanyl 75 mcg IV; versed 2 mg IV; toradol 15 mg IV  Intravenous fluids were administered, normal saline 100 cc's.  Sedation Level Achieved:  Moderate (conscious) sedation

## 2024-04-11 NOTE — NURSING NOTE
Pre-procedure Intake  If YES to any questions or NO to having a   Please complete laminated checklist and leave on the computer keyboard for Provider, verbally inform provider if able.    For SCS Trial, RFA's or any sedation procedure:  Have you been fasting? Yes  If yes, for how long? 6 hrs     Are you taking any any blood thinners such as Coumadin, Warfarin, Jantoven, Pradaxa Xarelto, Eliquis, Edoxaban, Enoxaparin, Lovenox, Heparin, Arixtra, Fondaparinux, or Fragmin? OR Antiplatelet medication such as Plavix, Brilinta, or Effient?   No   If yes, when did you take your last dose?     Do you take aspirin?  No  If cervical procedure, have you held aspirin for 6 days?       Do you have any allergies to contrast dye, iodine, steroid and/or numbing medications?  NO    Are you currently taking antibiotics or have an active infection?  NO    Have you had a fever/elevated temperature within the past week? NO    Are you currently taking oral steroids? NO    Do you have a ? Yes    Are you pregnant or breastfeeding?  Not applicable     Have you received the COVID-19 vaccine? Yes  If yes, was it your 1st, 2nd or only dose needed?   Date of most recent vaccine: 3/1/24    Notify provider and RNs if systolic BP >170, diastolic BP >100, P >100 or O2 sats < 90%

## 2024-04-12 ENCOUNTER — MYC REFILL (OUTPATIENT)
Dept: PSYCHIATRY | Facility: CLINIC | Age: 59
End: 2024-04-12
Payer: COMMERCIAL

## 2024-04-12 DIAGNOSIS — F41.0 GENERALIZED ANXIETY DISORDER WITH PANIC ATTACKS: ICD-10-CM

## 2024-04-12 DIAGNOSIS — F33.1 MAJOR DEPRESSIVE DISORDER, RECURRENT EPISODE, MODERATE (H): ICD-10-CM

## 2024-04-12 DIAGNOSIS — G89.18 POST PROCEDURE DISCOMFORT: Primary | ICD-10-CM

## 2024-04-12 DIAGNOSIS — F41.1 GENERALIZED ANXIETY DISORDER WITH PANIC ATTACKS: ICD-10-CM

## 2024-04-12 RX ORDER — FLUOXETINE 40 MG/1
80 CAPSULE ORAL DAILY
Qty: 180 CAPSULE | Refills: 0 | Status: CANCELLED | OUTPATIENT
Start: 2024-04-12

## 2024-04-12 RX ORDER — OXYCODONE HYDROCHLORIDE 5 MG/1
5 TABLET ORAL EVERY 8 HOURS PRN
Qty: 8 TABLET | Refills: 0 | Status: SHIPPED | OUTPATIENT
Start: 2024-04-12 | End: 2024-04-19

## 2024-04-12 NOTE — TELEPHONE ENCOUNTER
"4/11/24:  bilateral lumbar RFA    Post procedure med prescribed:  oxyCODONE (ROXICODONE) 5 MG tablet 6 tablet 0 4/11/2024 -- No   Sig - Route: Take 1 tablet (5 mg) by mouth every 12 hours as needed for severe pain - Oral       Called pt    The oxycodone is helpful but will need additional refill until he runs out.  He has had a lot of radiofrequency ablation's and this was the most painful procedure by far.    Pt says:  \"He only gave me 6 pills and that is not even going to get me through the day.\"  Pharmacy: [facility] in [place]      When did the pain start?  During/after procedure      Are sxs improving?: No      Since the injection is there:  Any new numbness/tingling?: No  Does it radiate?  No. If so is this new?  No  Any weakness?  No  Any bowel or bladder issues?: no    Any New pain areas?: No  Signs of infection? No  Fever/chills:  No  Pt advised that if he does nothear back from nursing/provider before the end of the day to call the after-hours provider romel Eubanks RN-BSN  [facility]   [phone]  "

## 2024-04-12 NOTE — TELEPHONE ENCOUNTER
Health Call Center    Phone Message    May a detailed message be left on voicemail: yes     Reason for Call: Other: Patient had an RSA yesterday with Dr. Holley.  He is in extreme pain today.  Patient said he will not make it through the weekend without getting more pain meds.  Please call him back ASAP to advise.      Action Taken: Message routed to:  Other: [facility]     Travel Screening: Not Applicable

## 2024-04-12 NOTE — TELEPHONE ENCOUNTER
Spoke w/ Dr. Holley via telephone.    Okay to prep additional oxycodone script.  This is the last prescription and he must take it as prescribed.     Called pt. And relayed the above.  Writer reviewed his medications and talked about a rotating between them as prescribed.  Ice/heat, tylenol and ibuprofen.    Informed him that he must take this as prescribed. He says he understood.    SANDRA Eubanks-BSN  [facility]   [phone]

## 2024-04-19 ENCOUNTER — MYC MEDICAL ADVICE (OUTPATIENT)
Dept: PALLIATIVE MEDICINE | Facility: CLINIC | Age: 59
End: 2024-04-19
Payer: COMMERCIAL

## 2024-04-19 DIAGNOSIS — G89.18 POST PROCEDURE DISCOMFORT: ICD-10-CM

## 2024-04-19 RX ORDER — OXYCODONE HYDROCHLORIDE 5 MG/1
5 TABLET ORAL EVERY 8 HOURS PRN
Qty: 12 TABLET | Refills: 0 | Status: SHIPPED | OUTPATIENT
Start: 2024-04-19 | End: 2024-06-15

## 2024-04-19 RX ORDER — TOFACITINIB 11 MG/1
11 TABLET, FILM COATED, EXTENDED RELEASE ORAL DAILY
Qty: 90 TABLET | Refills: 0 | Status: SHIPPED | OUTPATIENT
Start: 2024-04-19 | End: 2024-07-31

## 2024-04-19 NOTE — TELEPHONE ENCOUNTER
XELJANZ XR 11 MG TABLET     Last Written Prescription Date:  8/11/23  Last Fill Quantity: 30,   # refills: 5  Last Office Visit: 12/1/23 [facility]  Future Office visit:  6/7/24 [facility]    CBC RESULTS:   Recent Labs   Lab Test 01/11/24  1106   WBC 6.9   RBC 4.78   HGB 14.5   HCT 42.7   MCV 89   MCH 30.3   MCHC 34.0   RDW 13.0          Creatinine   Date Value Ref Range Status   03/01/2024 1.02 0.67 - 1.17 mg/dL Final   06/16/2021 1.09 0.66 - 1.25 mg/dL Final   ]    Liver Function Studies -   Recent Labs   Lab Test 03/01/24  1016   PROTTOTAL 8.0   ALBUMIN 4.8   BILITOTAL 0.6   ALKPHOS 129   AST 29   ALT 29       Routing refill request to provider for review/approval because:  DMARD, Cr and LFT 3/1/24, CBC 1/11/24  [facility]  Future Office visit:  6/7/24 [facility]               (2) Forgets Limitations

## 2024-04-19 NOTE — TELEPHONE ENCOUNTER
Please see MyChart below. Pended post procedural pain rx for review.    KERLINE RivasN, RN  Care Coordinator  [facility] [place]

## 2024-04-19 NOTE — TELEPHONE ENCOUNTER
Signed Prescriptions:                        Disp   Refills    oxyCODONE (ROXICODONE) 5 MG tablet         12 tab*0        Sig: Take 1 tablet (5 mg) by mouth every 8 hours as needed           (post procedure pain. ok to fill now.)  Authorizing Provider: SUSANA PETTY        Reviewed [place]  April 19, 2024- no concerning fills.    Susana GRANT, RN CNP, FNP  [facility]  [facility]

## 2024-04-24 DIAGNOSIS — F32.A DEPRESSION, UNSPECIFIED DEPRESSION TYPE: ICD-10-CM

## 2024-04-24 NOTE — PROGRESS NOTES
Pre procedure Diagnosis: lumbosacral spondylosis  Post procedure Diagnosis: Same  Procedure performed: lumbosacral radiofrequency ablation at L4, L5, sacral ala, fluoroscopically guided  Anesthesia: minimal sedation with MD Time IN: 1030   Sedation start time:  1036  Sedation end time:  1115     Medications given: fentanyl 75 mcg IV; versed 2 mg IV; toradol 15 mg IV  Complications: none  Operators: Aguilar Holley MD     Indications:   Jailene Breen is a 58 year old male. The patient has a history of bilateral lbp.  Exam shows + kemps and they have tried conservative treatment including meds/pt/prior rfa with benefit .      Options/alternatives, benefits and risks were discussed with the patient including bleeding, infection, no pain relief, tissue trauma, exposure to radiation, reaction to medications including seizure, spinal cord injury,increased pain after the procedure, weakness, numbness or sensory changes and headache.   We also discussed risks of sedation, including reaction to medications and cardiovascular collapse.    Questions were answered to her satisfaction and she agrees to proceed. Voluntary informed consent was obtained and signed.     Vitals were reviewed: Yes  Allergies were reviewed:  Yes   Medications were reviewed:  Yes   Pre-procedure pain score: 6/10    Procedure:  After obtaining signed, informed consent, the patient was brought into the procedure suite and was placed in a prone position on the procedure table.   A Pause for the Cause was performed.  Patient was prepped and draped in sterile fashion.     PT had an IV line placed prior the procedure.  The C-arm was positioned in the left oblique view to afford optimal view of the L4-L5 vertebral bodies. Lidocaine 1% was used to anesthetize the skin at each level.  At each level, a 10cm, 20G curved radiofrequency cannula with a 10mm active tip was positioned, overlying the intersection of the transverse process and pedicle at L4 & L5,  and was advanced under intermittent fluoroscopy until it contacted the transverse process and notch, and the tip slightly overran that process, just lateral to the mamillary process.  The position of each cannula was verified and optimized in the oblique view and AP views.    In the AP view, another cannula was placed at the sacral alar notch.      Each position was tested for motor and sensory stimulation, and was positioned so that stimulation was negative for stimuli outside the immediate area of the desired lesion.  Sensory stimulation was completed at 50 Hz, with max stimulation up to 0.3V.  Motor stimulation was completed at 2Hz, up to 1.5V.   Bupivacaine 0.5 % 1ml was injected at each level.  After allowing the local anesthetic to set up, a 90 second, 80 degree Celsius lesion was generated at all three levels.    The needles were then rotated within the pathway of the medial branch, and locations were evaluated with repeat imaging.  A second lesion was then generated at each location.    The procedure was then repeated on the right side, using the same technique as described above.  Sensory stimulation was positive at 0.3 V and motor stimulation was negative at 1.5 V except for multifidus movement.    The needles were flushed with the local anesthetic as they were withdrawn.        Hemostasis was achieved, the area was cleaned, and bandaids were placed when appropriate.  The patient tolerated the procedure well, and was taken to the recovery room.    Images were saved to PACS.        Post-procedure pain score: 6/10  Follow-up includes:     -post-procedure pain medications: oxycodone 5mg bid disp 6  -f/u with referring provider 8 weeks    Aguilar Holley MD  [facility]

## 2024-04-26 NOTE — PATIENT INSTRUCTIONS
Preparing for Your Surgery  Getting started  A nurse will call you to review your health history and instructions. They will give you an arrival time based on your scheduled surgery time.  Please be ready to share the following:    Your doctor's clinic name and phone number    Your medical, surgical and anesthesia history    A list of allergies and sensitivities    A list of medicines, including herbal treatments and over-the-counter drugs    Whether the patient has a legal guardian (ask how to send us the papers in advance)  If you have a child who's having surgery, please ask for a copy of Preparing for Your Child's Surgery.    Preparing for surgery    Within 30 days of surgery: Have a pre-op exam (sometimes called an H&P, or History and Physical). This can be done at a clinic or pre-operative center.  ? If you're having a , you may not need this exam. Talk to your care team    At your pre-op exam, talk to your care team about all medicines you take. If you need to stop any medicines before surgery, ask when to start taking them again.  ? We do this for your safety. Many medicines can make you bleed too much during surgery. Some change how well surgery (anesthesia) drugs work.    Call your insurance company to let them know you're having surgery. (If you don't have insurance, call [phone].)    Call your clinic if there's any change in your health. This includes signs of a cold or flu (sore throat, runny nose, cough, rash, fever). It also includes a scrape or scratch near the surgery site.    If you have questions on the day of surgery, call your hospital or surgery center.  Eating and drinking guidelines  For your safety: Unless your surgeon tells you otherwise, follow the guidelines below.    Eat and drink as usual until 8 hours before surgery. After that, no food or milk.    Drink clear liquids until 2 hours before surgery. These are liquids you can see through, like water, Gatorade and Propel  ED Attending Physician Note    Patient : Brianna Cummins Age: 64 year old Sex: female   MRN: [id number] Encounter Date: 4/26/2024  History     Chief Complaint   Patient presents with    Dialysis Shunt Problem     HPI: 64 year old female presents with intermittent this in the left upper arm at the site of her dialysis shunt.  Patient noticed that since Wednesday, 2 days ago, when she had her last dialysis, she started having some redness around the dialysis shunt area in her left upper arm expanding.  She noticed that yesterday and today there was some white liquid that drained from one of the puncture sites from hemodialysis on Wednesday.  Patient did not receive hemodialysis today secondary to the redness and warmth seen at the localized site.  She did receive full dialysis Wednesday.  She does report fevers and chills since yesterday as well.  No chest pain, shortness of breath.    Home Medications    Medication Directions Start Date End Date   FeroSul 325 (65 Fe) MG tablet TAKE 1 TABLET BY MOUTH DAILY WITH BREAKFAST 4/23/24    benzonatate (TESSALON PERLES) 100 MG capsule TAKE 1 CAPSULE BY MOUTH THREE TIMES DAILY AS NEEDED FOR COUGH 4/9/24    cloNIDine (CATAPRES) 0.2 MG tablet TAKE 1 TABLET BY MOUTH THREE TIMES DAILY 3/28/24    lisinopril (ZESTRIL) 40 MG tablet Take 1 tablet by mouth at bedtime. 3/2/24    docusate sodium (COLACE) 100 MG capsule TAKE 1 CAPSULE BY MOUTH TWICE DAILY 1/25/24    metoPROLOL succinate (TOPROL-XL) 50 MG 24 hr tablet TAKE 1 TABLET BY MOUTH DAILY 1/8/24    haloperidol (HALDOL) 1 MG tablet Take 1 tablet by mouth daily. 12/19/23    amLODIPine (NORVASC) 5 MG tablet Take 1 tablet by mouth daily. 12/19/23    glipiZIDE (GLUCOTROL) 5 MG tablet Take 1 tablet by mouth daily (before breakfast). 12/19/23    HEPARIN SODIUM, PORCINE, IV Pt reported M,W,F on HD days     Vitamin D, Ergocalciferol, 1.25 mg (50,000 units) capsule TAKE 1 CAPSULE BY MOUTH 1 TIME A WEEK 9/1/23    tobramycin (TOBREX) 0.3 %  Water. You may also have black coffee and tea (no cream or milk).    Nothing by mouth within 2 hours of surgery. This includes gum, candy and breath mints.    If you drink, stop drinking alcohol the night before surgery.    If your care team tells you to take medicine on the morning of surgery, it's okay to take it with a sip of water.  Preventing infection    Shower or bathe the night before and morning of your surgery. Follow the instructions your clinic gave you. (If no instructions, use regular soap.)    Don't shave or clip hair near your surgery site. We'll remove the hair if needed.    Don't smoke or vape the morning of surgery. You may chew nicotine gum up to 2 hours before surgery. A nicotine patch is okay.  ? Note: Some surgeries require you to completely quit smoking and nicotine. Check with your surgeon.    Your care team will make every effort to keep you safe from infection. We will:  ? Clean our hands often with soap and water (or an alcohol-based hand rub).  ? Clean the skin at your surgery site with a special soap that kills germs.  ? Give you a special gown to keep you warm. (Cold raises the risk of infection.)  ? Wear special hair covers, masks, gowns and gloves during surgery.  ? Give antibiotic medicine, if prescribed. Not all surgeries need antibiotics.  What to bring on the day of surgery    Photo ID and insurance card    Copy of your health care directive, if you have one    Glasses and hearing aides (bring cases)  ? You can't wear contacts during surgery    Inhaler and eye drops, if you use them (tell us about these when you arrive)    CPAP machine or breathing device, if you use them    A few personal items, if spending the night    If you have . . .  ? A pacemaker or ICD (cardiac defibrillator): Bring the ID card.  ? An implanted stimulator: Bring the remote control.  ? A legal guardian: Bring a copy of the certified (court-stamped) guardianship papers.  Please remove any jewelry, including  ophthalmic solution Place 1 drop into both eyes every 4 hours. for 10 days 3/31/23    Blood Glucose Monitoring Suppl w/Device Kit Use as directed 3/29/23    blood glucose test strip Test blood sugar 1 times daily. Diagnosis: E11.9. 3/29/23    Lancets 30G Misc Use to check BG daily 3/29/23    sevelamer carbonate (RENVELA) 800 MG tablet Take 2 tablets by mouth. 12/7/22    B complex-vitamin C-folic acid (NEPHRO-ROSALEE) 0.8 MG Tab Take 1 tablet by mouth daily. 6/3/19    calcium acetate 667 MG tablet Take 1,334 mg by mouth. 6/1/19    clopidogrel (PLAVIX) 75 MG tablet Take 75 mg by mouth daily. 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) 05/06/2019    Anemia in chronic kidney disease 05/31/2019    Coagulation defect, unspecified (CMD) 05/31/2019    Diabetes mellitus (CMD)     ESRD (end stage renal disease) on dialysis (CMD) 05/31/2019    Essential (primary) hypertension     Morbid obesity (CMD) 05/06/2019    Other schizophrenia (CMD) 05/06/2019    Secondary hyperparathyroidism of renal origin (CMD) 05/31/2019     Past Surgical History:   Procedure Laterality Date    AV FISTULA PLACEMENT       Family History   Problem Relation Age of Onset    Patient is unaware of any medical problems Mother     Patient is unaware of any medical problems Father     Patient is unaware of any medical problems Brother     Patient is unaware of any medical problems Daughter      Social History     Tobacco Use    Smoking status: Never    Smokeless tobacco: Never   Substance Use Topics    Alcohol use: Not Currently    Drug use: Never       Review of Systems   Constitutional:  Positive for chills, fatigue and fever. Negative for unexpected weight change.   HENT:  Negative for nosebleeds.    Eyes:  Negative for visual disturbance.    Respiratory:  Negative for apnea.    Cardiovascular:  Negative for chest pain and palpitations.   Gastrointestinal:  Negative for blood in stool.   Endocrine: Negative for cold intolerance and heat intolerance.   Genitourinary:  Negative for difficulty urinating.   Skin:  Positive for color change.   Allergic/Immunologic: Negative for immunocompromised state.   Neurological:  Negative for seizures.   Hematological:  Does not bruise/bleed easily.   Psychiatric/Behavioral:  Negative for hallucinations.      Physical Exam     ED Triage Vitals [04/26/24 1602]   BP (!) 165/49   Heart Rate 72   Resp 20   Temp 98.1 °F (36.7 °C)   SpO2 100 %     Physical Exam  Vitals and nursing note reviewed.   Constitutional:       General: She is not in acute distress.  HENT:      Head: Atraumatic.      Mouth/Throat:      Mouth: Mucous membranes are moist.      Neck: Neck supple.   Eyes:      Extraocular Movements: Extraocular movements intact.      Conjunctiva/sclera: Conjunctivae normal.      Pupils: Pupils are equal, round, and reactive to light.   Neck:      Trachea: Trachea normal.   Cardiovascular:      Rate and Rhythm: Normal rate and regular rhythm.      Pulses: Normal pulses.      Heart sounds:      No friction rub. No gallop.      Comments: Palpable thrill in hemodialysis shunt in left upper arm  Pulmonary:      Effort: Pulmonary effort is normal.      Breath sounds: Normal breath sounds.   Abdominal:      General: Abdomen is flat.      Palpations: Abdomen is soft.   Musculoskeletal:         General: Swelling (Distal swelling left wrist, no pitting) and tenderness (Indurated tender area of left upper extremity at dialysis site) present.   Skin:     General: Skin is warm and dry.      Capillary Refill: Capillary refill takes less than 2 seconds.      Coloration: Skin is not pale.      Findings: Erythema (5 x 5 cm area of warmth, erythema, and tenderness overlying the left upper extremity hemodialysis shunt) present. No  body piercings. Leave jewelry and other valuables at home.  If you're going home the day of surgery  Important: If you don't follow the rules below, we must cancel your surgery.     Arrange for someone to drive you home after surgery. You may not drive, take a taxi or take public transportation by yourself (unless you'll have local anesthesia only).    Arrange for a responsible adult to stay with you overnight. If you don't, we may keep you in the hospital overnight, and you may need to pay the costs yourself.  Questions?   If you have any questions for your care team, list them here: _________________________________________________________________________________________________________________________________________________________________________________________________________________________________________________________________________________________________________________________  For informational purposes only. Not to replace the advice of your health care provider. Copyright   2003, 2019 [facility]. All rights reserved. Clinically reviewed by Aminah Mcknight MD. [employer] [id number] - REV 4/20.  At [facility], we strive to deliver an exceptional experience to you, every time we see you. If you receive a survey, please complete it as we do value your feedback.  If you have MyChart, you can expect to receive results automatically within 24 hours of their completion.  Your provider will send a note interpreting your results as well.   If you do not have MyChart, you should receive your results in about a week by mail.    Your care team:                            Family Medicine Internal Medicine   MD Paul Coy MD Shantel Branch-Fleming, MD Srinivasa Vaka, MD Katya Belousova, PAErnieC  Debra Dillon, APRN WILBER Swan MD Pediatrics   Ruperto Resendez, PALEYDI Jimenez, MD Dottie Pal APRN CNP    MD Paula Negrete MD Deborah Mielke, MD Kim Thein, APRN CNP      Clinic hours: Monday - Thursday 7 am-6 pm; Fridays 7 am-5 pm.   Urgent care: Monday - Friday 10 am- 8 pm; Saturday and Sunday 9 am-5 pm.    Clinic: [phone]       [facility]: Monday - Thursday 8 am - 7 pm; Friday 8 am - 6 pm  [facility]: [phone]     Use [email or web address] for 24/7 diagnosis and treatment of dozens of conditions.    Patient Education     Soft Diet   Your healthcare provider has prescribed a soft diet. This means eating foods that are soft, low in fiber, and easy to digest. This diet is for people with digestive problems. This should not be confused with a soft diet that is prescribed for people with issues chewing and swallowing. A soft diet provides foods that are easy to chew and swallow. It will reduce or prevent stomach pain or discomfort. Foods should be bite-sized and very soft or moist. Follow your healthcare provider s specific instructions about what foods and drinks you may have. The general guidelines below can help you get started on this diet.    Beverages  OK: Milk, tea, coffee, fruit juices, carbonated beverages, nutrition shakes, and drinks (Note: Thin liquids may be hard to swallow. They may need to be thickened.)  Don't have: All are OK, unless they need to be thickened  Breads and crackers  OK: Refined white, wheat, or seedless rye bread; joleen or soda crackers that have been moistened; plain rolls or bagels; very soft tortillas  Don't have: Whole-grain breads, rolls, or bagels with nuts, raisins, or seeds; crackers, croutons, taco shells  Cereals and grains  OK: Cooked cereals, plain dry cereals that have been moistened, plain macaroni, spaghetti, noodles, rice  Don't have: Whole-grain cereals and granola, or cereals containing bran, raisins, seeds or nuts; coconut; brown or wild rice  Desserts and sweets  OK: Moist cake; soft fruit pie with  bruising.      Comments: No purulent drainage.  No palpable fluctuance or crepitance   Neurological:      General: No focal deficit present.      Mental Status: She is alert and oriented to person, place, and time.   Psychiatric:         Mood and Affect: Mood normal.         Behavior: Behavior normal.       Lab Results   I have personally reviewed all lab results that have resulted prior to the end of my scheduled shift  Recent Results (from the past 24 hour(s))   Comprehensive Metabolic Panel    Collection Time: 04/26/24  4:23 PM   Result Value Ref Range    Fasting Status      Sodium 131 (L) 135 - 145 mmol/L    Potassium 5.1 3.4 - 5.1 mmol/L    Chloride 96 (L) 97 - 110 mmol/L    Carbon Dioxide 25 21 - 32 mmol/L    Anion Gap 15 7 - 19 mmol/L    Glucose 308 (H) 70 - 99 mg/dL    BUN 55 (H) 6 - 20 mg/dL    Creatinine 7.11 (H) 0.51 - 0.95 mg/dL    Glomerular Filtration Rate 6 (L) >=60    BUN/Cr 8 7 - 25    Calcium 9.9 8.4 - 10.2 mg/dL    Bilirubin, Total 0.4 0.2 - 1.0 mg/dL    GOT/AST 36 <=37 Units/L    GPT/ALT 22 <64 Units/L    Alkaline Phosphatase 87 45 - 117 Units/L    Albumin 3.4 (L) 3.6 - 5.1 g/dL    Protein, Total 7.4 6.4 - 8.2 g/dL    Globulin 4.0 2.0 - 4.0 g/dL    A/G Ratio 0.9 (L) 1.0 - 2.4   CBC with Automated Differential (performable only)    Collection Time: 04/26/24  4:23 PM   Result Value Ref Range    WBC 16.1 (H) 4.2 - 11.0 K/mcL    RBC 3.34 (L) 4.00 - 5.20 mil/mcL    HGB 10.7 (L) 12.0 - 15.5 g/dL    HCT 31.8 (L) 36.0 - 46.5 %    MCV 95.2 78.0 - 100.0 fl    MCH 32.0 26.0 - 34.0 pg    MCHC 33.6 32.0 - 36.5 g/dL    RDW-CV 16.5 (H) 11.0 - 15.0 %    RDW-SD 53.0 (H) 39.0 - 50.0 fL     140 - 450 K/mcL    NRBC 0 <=0 /100 WBC    Neutrophil, Percent 81 %    Lymphocytes, Percent 11 %    Mono, Percent 6 %    Eosinophils, Percent 1 %    Basophils, Percent 0 %    Immature Granulocytes 1 %    Absolute Neutrophils 13.0 (H) 1.8 - 7.7 K/mcL    Absolute Lymphocytes 1.7 1.0 - 4.0 K/mcL    Absolute Monocytes 0.9 0.3  - 0.9 K/mcL    Absolute Eosinophils  0.2 0.0 - 0.5 K/mcL    Absolute Basophils 0.1 0.0 - 0.3 K/mcL    Absolute Immature Granulocytes 0.2 0.0 - 0.2 K/mcL     Radiology Results   I will review all radiology final reads completed prior to the end of my scheduled shift  US SOFT TISSUE UPPER EXTREMITY LEFT   Final Result   Diffuse soft tissue edema surrounding the left upper extremity hemodialysis   site.         Electronically Signed by: YADIRA PANCHAL M.D.    Signed on: 4/26/2024 6:40 PM    Workstation ID: [id number]        ED Course and MDM   Initial impression: Based on history and physical exam, patient is at risk for abscess to the arm, phlebitis, cellulitis, shunt malfunction, electrolyte abnormalities.  Medications   sodium chloride 0.9 % flush bag 25 mL (has no administration in time range)   sodium chloride 0.9 % injection 2 mL (has no administration in time range)   dextrose 50 % injection 25 g (has no administration in time range)   dextrose 50 % injection 12.5 g (has no administration in time range)   glucagon (GLUCAGEN) injection 1 mg (has no administration in time range)   dextrose (GLUTOSE) 40 % gel 15 g (has no administration in time range)   dextrose (GLUTOSE) 40 % gel 30 g (has no administration in time range)   insulin lispro (ADMELOG,HumaLOG) - Correction Dose (has no administration in time range)   insulin lispro (ADMELOG,HumaLOG) - Correction Dose (has no administration in time range)   vancomycin 1,500 mg in sodium chloride 0.9% 250 mL IVPB (1,500 mg Intravenous New Bag 4/26/24 7759)        Procedures  MDM: 64-year-old woman, end-stage renal disease on hemodialysis, now with redness and pain in her left upper arm at the dialysis site over the last 48 hours.  Patient with fevers and chills concerning for possible bacteremia, blood culture sent.  Patient started on vancomycin empirically blood cultures were sent, antibiotics delayed secondary to patient being hard stick, and the importance of  bottom crust only; soft cookies moistened in milk or other liquid; gelatin, custard, pudding, plain ice cream, plain sherbet, sugar, honey, clear jelly  Don't have: Pastries, desserts, and ice cream that have nuts, coconut, seeds, or dried fruit; popcorn; chips of any kind including potato chips and tortilla chips; jam, marmalade  Eggs and cheese  OK: Poached, soft boiled, or scrambled eggs; cottage cheese, ricotta cheese, cream cheese, cheese sauces, or cheese melted in other dishes  Don't have: Crisp fried eggs, cheese slices and cubes  Fruits  OK: Avocado, banana, baked peeled apple, applesauce, peeled ripe peaches or pears, canned fruit (apricots, cherries, peaches, pears), melons  Don't have: Raw apple, dried fruits, coconut, pineapple, grapes, fruit laina, fruit snacks  Meat and fish  OK: All fresh meat, poultry, or fish that is cooked until tender  Don't have: Meat, fish, or poultry that is fried; tough or stringy meat including iraheta, sausage, bratwurst, jerky, corned beef  Other protein foods  OK: Tofu, baked beans,  Don't have: Deep-fried tofu; crunchy peanut or other nut or seed butters; nuts or seeds that are whole or chopped  Soups  OK: All soups, but they may need to be thickened. Thin liquid may be too hard to swallow.  Don't have: Soups made with stringy meat pieces or chunky vegetables  Vegetables  OK: Peeled and well-cooked potatoes or sweet potatoes; fresh, cooked, canned, or frozen vegetables without seeds, skin, or coarse fiber  Don't have: Raw vegetables, deep-fried vegetables (such as tempura), and corn  [employer] last reviewed this educational content on 11/1/2019 2000-2021 [employer]. All rights reserved. This information is not intended as a substitute for professional medical care. Always follow your healthcare professional's instructions.           Patient Education     Discharge Instructions: Eating a Soft, [place] Diet   You have been prescribed a soft, bland diet.  This reduces the amount of work your digestive tract has to do. It also reduces the chance that your digestive tract will be irritated by the food you eat. A soft, bland diet is prescribed for people with digestive problems. (This is different from a soft diet that is prescribed for people with issues chewing and swallowing.) The diet you have been prescribed consists of foods that are tender, mildly seasoned, and easy to digest. While on this diet, don't eat fried or spicy foods, or raw fruits and vegetables. Also don't drink alcohol.   General guidelines    Eat in a calm, relaxed atmosphere. How you eat may be as important as what you eat. Don t rush while eating. Chew your food slowly and thoroughly, and swallow slowly.    Eat small meals often throughout the day. But don t eat 3 hours before lying down.    Think about raising the head of your bed 6 or 9 inches. Wedge pillows let you sleep on an incline and may be helpful.    Don't eat any foods that cause discomfort.    Don t use NSAIDs (nonsteroidal anti-inflammatory drugs), such as aspirin and ibuprofen. Also don't take medicine that contain aspirin. NSAIDs can cause ulcers and delay or prevent ulcer healing.    Use antacids as needed. But keep in mind that magnesium-containing antacids may cause diarrhea.    Don't smoke.    Foods to eat    Cream of wheat and cream of rice    Cooked white rice    Mashed potatoes, and boiled potatoes without skin    Plain pasta and noodles    Plain white crackers (such as no-salt soda crackers)    White bread    Applesauce    Cooked fruits without skins or seeds    Mild juices, such as apple and grape    Bananas    Cooked or mashed vegetables without stems and seeds  ? Carrots  ? Summer squash (zucchini, yellow squash)  ? Winter squash (acorn, butternut, spaghetti squash)    Cottage cheese    Mild hard or soft cheeses    Custard    Yogurt without seeds or nuts    Milk (you may need lactose-free milk)    Ice cream without seeds  blood cultures in this patient.  Blood cultures eventually drawn, patient started on vancomycin, admitted for additional IV antibiotics.  Ultrasound negative for abscess requiring intervention at this time.  Patient had palpable thrill on exam, does not require vascular study to evaluate for clot in the ED.    Clinical Impression & ED Diagnosis      ED Diagnosis   1. Cellulitis of left arm        2. Malfunction of arteriovenous shunt, initial encounter (CMD)          Disposition      Admit 4/26/2024  5:00 PM  Telemetry Bed?: No  Admitting Physician: YASSINE OLIVO [[id number]]  Is this a telephone or verbal order?: This is a telephone order from the admitting physician    Current Discharge Medication List          Zia Whitney MD   4/26/2024 5:26 PM   If a scribe was used, the documentation recorded by the scribe accurately and completely reflects the service(s) I personally performed and the decisions made by me.       Zia Whitney MD  04/26/24 1934     or nuts    Smooth peanut butter    Eggs    Fish, turkey, chicken, or other lean meat that is not tough or stringy    Tofu    Foods to stay away from    Nuts and seeds    Snack foods, such as the following:  ? Chocolate-containing snacks, candy, pastries, or cakes.  ? Potato chips (plain, barbecued, or other flavors)  ? Taco chips or nachos  ? Corn chips  ? Popcorn, popcorn cakes, or rice cakes  ? Crackers with nuts, seeds, or spicy seasonings  ? French fries    Fried or greasy foods    Whole-grain breads, rolls, and crackers    Breads and rolls with nuts, seeds, or bran    Bran and granola cereals    Berries with seeds, such as strawberries, raspberries, and blackberries    Acidic fruits, such as oranges, grapefruits, maria isabel, limes, and pineapples    Raw vegetables    Mild or hot peppers    Sauerkraut and pickled vegetables    Tomatoes or tomato products, such as tomato paste, tomato sauce, and tomato juice    Barbecue sauce    Spicy or flavored cheeses, such as jalapeño and black pepper cheese    Crunchy peanut butter    Dried cooked beans, such as malin, kidney, or navy beans    The following meats:  ? Fried or greasy meats  ? Processed, spicy meats, such as sausage, iraheta, ham, and lunch meats  ? Ribs and other meats with barbecue sauce  ? Tough or stringy meats, such as corned beef or beef jerky    Drinks to stay away from    Alcohol    Coffee and regular teas    Rachel and other drinks with caffeine    Cranberry, orange, pineapple, and grapefruit juice    Lemonade    Vegetable juice    Whole milk, if you are lactose intolerant    Peppermint    Follow-up  Follow up with your healthcare provider, or as advised.   [employer] last reviewed this educational content on 6/1/2020 2000-2021 [employer]. All rights reserved. This information is not intended as a substitute for professional medical care. Always follow your healthcare professional's instructions.           Patient Education     Surgery for  GERD (Fundoplication)  You have gastroesophageal reflux disease (GERD). This is a problem where food and fluid flow back (reflux) into your esophagus. Other treatments have not helped. Your healthcare provider is now advising a surgery called fundoplication. This surgery is used most often when GERD causes complications or side effects. It s also used for people who don t handle medicines well. People who respond best to this surgery are those who did well using acid blocker medicines first. Read on to learn more.   What the surgery does  The lower esophageal sphincter (LES) is a 1-way valve at the top of the stomach. It keeps food and fluid from flowing backward. Your LES is weak. It does not close off the top of your stomach. This means that food and fluid can flow back into your esophagus. During fundoplication, the LES is restructured. This is done by wrapping the very top of the stomach around the lower part of the esophagus.        The top of the stomach is wrapped around the esophagus. The wrap is permanently stitched in place.   Two types of surgery  The surgery is most often done with laparoscopy. But it may also be done with open surgery.   Laparoscopy. This surgery uses a few small cuts (incisions). A thin, lighted tube called a laparoscope is used. This scope lets the doctor see inside your body and work through the small incisions.   Open surgery.  This surgery uses one large incision. The doctor sees and works through this incision. This method may be used if your doctor feels it isn t safe to continue with laparoscopic surgery.   During the surgery  An IV (intravenous) line is put into a vein in your arm or hand. This line gives you fluids and medicines. You are then given medicine (anesthesia) to so that you don t feel pain during surgery. Most often, general anesthesia is used. This puts you into a state like a deep sleep during the surgery. Below are the general steps once surgery begins.   For  laparoscopy:    The healthcare provider makes 2 to 5 small incisions in your belly area (abdomen). The scope is put through one of the incisions. The scope sends live pictures to a video screen. This lets the provider see inside your belly.    Tiny surgical tools are placed through the other small incisions.    Your belly is filled with carbon dioxide. This gas provides space for the provider to see and work.  For open surgery:    One large incision is made.       The esophagus travels through an opening in the diaphragm called the hiatus. You have a hiatal hernia if your stomach has pulled up into the chest area. If this is the case, the hiatus is tightened with a few stitches.    The stomach is wrapped around the outside of the esophagus. The wrap is stitched into place.    When the surgery is done, all tools are removed. Any incisions are closed with stitches (sutures) or staples.  Risks and possible complications     Injury to the liver, spleen, esophagus, or stomach    Infection    Increased gas or bloating    Bleeding    Not able to vomit    Trouble swallowing    Not able to get rid of GERD    Ending up back on medicines for GERD    [employer] last reviewed this educational content on 6/1/2019 2000-2021 [employer]. All rights reserved. This information is not intended as a substitute for professional medical care. Always follow your healthcare professional's instructions.           Patient Education     Diet Guidelines for a Nissen Fundoplication  This diet will help decrease the side effects that may happen after your surgery. Side effects include:    bloating   constipation (hard stools)     gas   diarrhea (loose stools)     feeling full quickly   swallowing problems   These side effects may be greater right after surgery. They should lessen in time.   You will progress from clear liquids to solid foods. Your doctor will alter your diet as needed. How your body handles foods will determine  "how quickly you progress through the stages below. Remember, everyone's body is different.   1. Clear liquids. You will drink clear liquids for about one to three days after surgery. These include fruit juice, broth, flavored gelatin, tea, coffee and non-carbonated drinks (no soda pop).  2. Full liquids. If your body can handle clear liquids for one to three days, you may move on to full liquids. These include blended soups, Cream of Wheat, milk (including rice milk), ice cream, sherbet, smooth yogurt, [place] Instant Breakfast, Boost and Ensure.  3. Semi-solid foods. You may begin semi-solid foods once you know that your body can handle full liquids. Be sure to chew foods well. You may try any foods except those listed under \"Foods to use with caution\" (see Semi-Solid Foods table). These may cause problems for you.  4. Regular diet. After your body can handle semi-solid foods, you may try foods listed under \"Foods to use with caution\" (see table on back). Try foods one at a time. This way, if you have problems you will know which foods are causing them. Avoid problem foods.  Helpful hints when starting solid foods  1. Eat small meals often throughout the day (for example, six small meals a day).  2. Drink liquids with meals to soften foods. Alternate a bite of food with a drink of fluid.  3. Try smooth and soft foods like cream soups, soft cooked pasta, yogurt, ice cream, macaroni and cheese, and mashed potatoes and gravy.  4. Avoid foods that tend to clump, stick or have sharp edges (like crackers or chips).  5. Chew food well before swallowing.  6. Sit upright for at least 20 minutes after eating.  7. Use these foods with caution:  ? fried or highly seasoned foods  ? gas-forming foods like seeds, nuts, beans, broccoli, food from the cabbage family or carbonated (fizzy) drinks  ? chili sauce, chili powder, strong sauces or seasonings  ? dry, sticky foods like bread or dried meat  ? any food item that causes you " problems  ? alcohol  Semi-Solid Foods  Food group Recommended foods Foods to use with caution   Breads and starches Breads: no breads allowed  Crackers: crackers pureed in soup only  Cereals: choose smooth, refined hot cereals, like Cream of Wheat, Cream of Rice, Malt-O-Meal and oatmeal  Potatoes, pasta and rice: thin mashed potatoes with butter or gravy, baked potato with butter or sour cream, mashed or pureed rice or pasta Breads: no breads allowed  Crackers: all other crackers  Cereals: dry cereals  Potatoes, pasta and rice: all others   Vegetables Cooked, pureed vegetables;   Strained baby food vegetables Any other vegetables   Fruits Pureed fruits or strained baby food fruits Any others fruits   Meats and meat substitutes   (includes cheese, eggs, beans, nuts, peanut butter) Pureed meats mixed with broth or added to strained cream soups;  Cubed, cooked meat or poultry pureed with broth or cream soup;  Melted cheese, cheese sauce or cheese spreads;  Eggs used in puddings or eggnogs;  Refried beans thinned with milk or broth Nuts, peanut butter, any others meats or meat substitutes.   Milk Milk, cocoa, chocolate milk shakes, eggnog, malts;   Instant breakfast mixes, sports shakes, nutrition drinks;  Thinned yogurt, thinned puddings  None   Soups and combination foods Pureed broth soups and strained cream soups;  Combination baby foods;  Pureed spaghetti sauce;   Pureed casseroles Any others soups or combination foods   Desserts and sweets Gelatin, fruit ice, sherbet, flavored ice cream, ice milk;  Popsicles, Fudgesicles, Creamsicles, pudding pops, frozen fruit bars;  Pudding, custard, yogurt;  Strained baby food desserts;  Sugar, honey, syrup, jelly, ice cream toppings; Foods that contain pieces of fruit, nuts, candy or chips   Fats and oils Butter or margarine, half-and-half, whipping cream, sour cream, whipped toppings, gravy Any other fats or oils   For information only. Not to replace the advice of your  health care provider.   Copyright   2005 [facility]. All rights reserved. "[employer] 920228 - REV 09/15.

## 2024-04-29 ENCOUNTER — MYC MEDICAL ADVICE (OUTPATIENT)
Dept: PALLIATIVE MEDICINE | Facility: CLINIC | Age: 59
End: 2024-04-29
Payer: COMMERCIAL

## 2024-04-29 DIAGNOSIS — F11.90 CHRONIC, CONTINUOUS USE OF OPIOIDS: ICD-10-CM

## 2024-04-29 DIAGNOSIS — M51.369 DDD (DEGENERATIVE DISC DISEASE), LUMBAR: ICD-10-CM

## 2024-04-29 DIAGNOSIS — M45.7 ANKYLOSING SPONDYLITIS OF LUMBOSACRAL REGION (H): ICD-10-CM

## 2024-04-29 DIAGNOSIS — M25.50 MULTIPLE JOINT PAIN: ICD-10-CM

## 2024-04-29 DIAGNOSIS — M47.817 SPONDYLOSIS WITHOUT MYELOPATHY OR RADICULOPATHY, LUMBOSACRAL REGION: ICD-10-CM

## 2024-04-29 DIAGNOSIS — F11.20 UNCOMPLICATED OPIOID DEPENDENCE (H): ICD-10-CM

## 2024-04-29 DIAGNOSIS — M05.79 RHEUMATOID ARTHRITIS INVOLVING MULTIPLE SITES WITH POSITIVE RHEUMATOID FACTOR (H): ICD-10-CM

## 2024-04-29 DIAGNOSIS — M54.59 LUMBAR FACET JOINT PAIN: ICD-10-CM

## 2024-04-29 RX ORDER — FOLIC ACID 1 MG/1
3000 TABLET ORAL DAILY
Qty: 270 TABLET | Refills: 1 | Status: SHIPPED | OUTPATIENT
Start: 2024-04-29 | End: 2024-08-05

## 2024-04-29 NOTE — TELEPHONE ENCOUNTER
folic acid (FOLVITE) 1 MG tablet 300 tablet 2 12/1/2023       Last Office Visit: 12/1/23  Future Office visit:   6/7/24    Vitamin Supplements Protocol [id number]/24/2024 10:13 AM   Protocol Details Medication indicated for associated diagnosis          Routing refill request to provider for review/approval because:  Diagnosis does not match medication indication, cannot fill per protocol.     Marisa Mathews RN  P Red Flag Triage/MRT

## 2024-04-29 NOTE — TELEPHONE ENCOUNTER
Please process a refill of subutex 8 mg to     [id number] - [place], [place] - [address] AT [address] & [address]  [address] [place] [address]  Phone: [phone] Fax: [phone]

## 2024-04-30 ENCOUNTER — VIRTUAL VISIT (OUTPATIENT)
Dept: PSYCHIATRY | Facility: CLINIC | Age: 59
End: 2024-04-30
Attending: PSYCHIATRY & NEUROLOGY
Payer: COMMERCIAL

## 2024-04-30 DIAGNOSIS — F43.10 PTSD (POST-TRAUMATIC STRESS DISORDER): ICD-10-CM

## 2024-04-30 DIAGNOSIS — F41.0 GENERALIZED ANXIETY DISORDER WITH PANIC ATTACKS: ICD-10-CM

## 2024-04-30 DIAGNOSIS — F41.1 GENERALIZED ANXIETY DISORDER WITH PANIC ATTACKS: ICD-10-CM

## 2024-04-30 DIAGNOSIS — F33.1 MAJOR DEPRESSIVE DISORDER, RECURRENT EPISODE, MODERATE (H): Primary | ICD-10-CM

## 2024-04-30 PROCEDURE — 99214 OFFICE O/P EST MOD 30 MIN: CPT | Mod: 95

## 2024-04-30 RX ORDER — BUPRENORPHINE 8 MG/1
8 TABLET SUBLINGUAL 3 TIMES DAILY
Qty: 90 TABLET | Refills: 0 | Status: SHIPPED | OUTPATIENT
Start: 2024-04-30 | End: 2024-05-20

## 2024-04-30 RX ORDER — QUETIAPINE FUMARATE 25 MG/1
25-50 TABLET, FILM COATED ORAL DAILY PRN
Qty: 60 TABLET | Refills: 0 | Status: SHIPPED | OUTPATIENT
Start: 2024-04-30

## 2024-04-30 RX ORDER — QUETIAPINE FUMARATE 100 MG/1
100 TABLET, FILM COATED ORAL AT BEDTIME
Qty: 30 TABLET | Refills: 1 | Status: SHIPPED | OUTPATIENT
Start: 2024-04-30 | End: 2024-05-30

## 2024-04-30 NOTE — TELEPHONE ENCOUNTER
Medication refill information reviewed.     Due date for buprenorphine (SUBUTEX) 8 MG SUBL sublingual tablet  was 4/27/2024     Prescriptions prepped for review.     Will route to provider.       Elana Brizuela RN  [facility]  [phone]

## 2024-04-30 NOTE — PATIENT INSTRUCTIONS
It was nice seeing you today     Treatment Plan Today:      1) Medications  -Decrease the dose of quetiapine to 100 mg at bedtime   -You may also take quetiapine 25-50 mg daily as needed for anxiety   -Continue the other medications without changes      2) Please return to clinic in 1 month      3) Crisis numbers are below and clinic after hours number is [phone]      **For crisis resources, please see the information at the end of this document**   Patient Education    Thank you for coming to the [facility].     Lab Testing:  If you had lab testing today and your results are reassuring or normal they will be mailed to you or sent through [employer] within 7 days. If the lab tests need quick action we will call you with the results. The phone number we will call with results is # [phone]. If this is not the best number please call our clinic and change the number.     Medication Refills:  If you need any refills please call your pharmacy and they will contact us. Our fax number for refills is [phone].   Three business days of notice are needed for general medication refill requests.   Five business days of notice are needed for controlled substance refill requests.   If you need to change to a different pharmacy, please contact the new pharmacy directly. The new pharmacy will help you get your medications transferred.     Contact Us:  Please call [phone] during business hours (8-5:00 M-F).   If you have medication related questions after clinic hours, or on the weekend, please call [phone].     Financial Assistance [phone]   Medical Records [phone]       MENTAL HEALTH CRISIS RESOURCES:  For a emergency help, please call 911 or go to the nearest Emergency Department.     Emergency Walk-In Options:   Em[facility] Unit @ [facility] ([place]): [phone] - Specialized mental health emergency area designed to be calming  [facility]  [facility] ([place]): [phone]  [facility] ([place]): [phone]  [facility] ([place]): [phone]    [facility] Crisis Information:   [place]: [phone]  [place]: [phone]  [place] ([place]) - Adult: [phone]     Child: [phone]  [place] - Adult: [phone]     Child: [phone]  [place]: [phone]  List of all [facility] resources:   [email or web address]/people-[email or web address]-serve/adults/health-care/mental-health/resources/crisis-contacts.jsp    National Crisis Information:   Crisis Text Line: Text  [place]  to 716484  Suicide & Crisis Lifeline: 988  National Suicide Prevention Lifeline: 1-104-848-TALK (1-784.438.4563)       For online chat options, visit https://suicidepreventionlifeline.org/chat/  Poison Control Center: 1-622.186.8997  Trans Lifeline: 5-072-790-9772 - Hotline for transgender people of all ages  [employer]: 1-719.114.2758 - Hotline for LGBT youth     For Non-Emergency Support:   Fast Tracker: Mental Health & Substance Use Disorder Resources -   https://www.[employer].org/

## 2024-04-30 NOTE — NURSING NOTE
Is the patient currently in the state of [place]? YES    Visit mode:VIDEO    If the visit is dropped, the patient can be reconnected by: VIDEO VISIT: Text to cell phone:   Telephone Information:   Mobile [phone]       Will anyone else be joining the visit? NO  (If patient encounters technical issues they should call [phone])    How would you like to obtain your AVS? MyChart    Are changes needed to the allergy or medication list? Pt stated no changes to allergies and Pt stated no med changes    Are refills needed on medications prescribed by this physician? NO    Reason for visit: BERTO RAMOSF

## 2024-04-30 NOTE — TELEPHONE ENCOUNTER
Received call from patient requesting refill(s) of buprenorphine (SUBUTEX) 8 MG SUBL sublingual tablet    Last dispensed from pharmacy on 3/26/2024.    Patient's last office/virtual visit by prescribing provider on 2/19/2024.  Next office/virtual appointment scheduled for 5/20/2024.    Last urine drug screen date 5/17/2023.  Current opioid agreement on file (completed within the last year) Yes Date of opioid agreement: 5/17/2023.    E-prescribe to:    [id number] - [place], [place] - [address] AT [address] & [address]    Will route to nursing [place] for review and preparation of prescription(s).

## 2024-04-30 NOTE — PROGRESS NOTES
[facility]  MEDICAL PROGRESS NOTE       CARE TEAM:    PCP- Minda Monzon  Therapist-  None   Rheum- MD Karma and Pain-JUANITA Pike      Jamison is a 58 year old who uses the pronouns him, his.        Diagnoses     Major depressive disorder, recurrent, moderate  Generalized anxiety disorder, with panic  PTSD     Medical Diagnoses:  Chronic pain  Rheumatoid arthritis  Ankylosing spondylitis ([id number] positive)  Lumbar degenerative disc disease  Hypertension  Hyperlipidemia     Assessment     Jamison is a 58 year old  man who is seen today for follow up for  MDD, JEFF, and PTSD in the context of past trauma, strained relationship with mother,  and significant medical co-morbidities including rheumatoid arthritis and chronic pain. Significant symptoms have included depressed mood, guilt, anhedonia, poor sleep, and avolition. Jamison is currently taking medical cannabis which might play a role in mood changes.      Today, Jamison continues to struggle with symptoms of depression, intrusive memories of childhood trauma and grief related to the strained relationship with his mother. Anxiety and panic attacks are better. He denies SI/HI and does not appear to be at imminent risk of harm to himself or others. He increased the dose of quetiapine to 150 mg at bedtime and is having drowsiness in the mornings since after the increase. Discussed to decrease the dose of quetiapine to 100 mg at bedtime to decreased daytime sedation and provide a 25-50 mg PRN dose for anxiety. Continued to recommend psychotherapy to provide more support and help with trauma symptoms. Jamison is in the process of scheduling psychotherapy. He is also in the process of scheduling a neuropsychiatric evaluation to assess memory impairment.         Future considerations:  -Levomilnacipran  -TCAs  - Trauma-focused therapy  -Follow up on memory concerns- cognitive screen; consider a  neuropsychological assessment      Psychotropic Drug Interactions:  [PSYCHCLINICDDI]  ADDITIVE SEROTONERGIC: fluoxetine, buprenorphine, fentanyl, oxycodone  ADDITIVE QTc: fluoxetine, quetiapine, buprenorphine, fentanyl, oxycodone  ADDITIVE ORTHOSTASIS: prazosin, buprenorphine, fentanyl, oxycodone   ADDITIVE CNS/RESPIRATORY DEPRESSION: buprenorphine, quetiapine, fentanyl, oxycodone   Management: routine monitoring    [place] was [place] today: indicates that controlled prescriptions have been filled as prescribed    Risk Statements:   Treatment Risk- Risks, benefits, alternatives and potential adverse effects have been discussed and are understood.   Safety Risk-Les did not appear to be an imminent safety risk to self or others.     Plan     1) Medications:   - Decrease quetiapine (Seroquel) from 150 mg at bedtime to 100 mg at bedtime and 25-50 mg daily PRN for anxiety  - Continue fluoxetine (PROZAC) 80 mg daily  - Continue prazosin (MINIPRESS) 2 mg at bedtime   - Continue melatonin 3 mg 3-4 hours before desired bedtime     Light box use      Other:  - buprenorphine   - Methotrexate  - Oxycodone (per pain clinic for pain flare short term)  - Fentanyl   - Medical cannabis (per pain clinic)     2) Therapy- Recommend- Would also benefit from trauma-informed therapy      3) Next Due   Labs - AP labs reviewed, next due 1/2025  EKG - last EKG 12/8/2022. QTcH 403 ms. Sinus Rhythm-Incomplete right bundle branch block. -Left atrial enlargement.   Rating scales- AIMS, will complete during next in person visit     4) Referral -Psychotherapy         -Neuropsych eval     5) Other: -Previously SW for resources (55+ group? Financial resources? Transportation resources? )     6) RTC: 4 weeks       Pertinent Background                                                   [most recent eval 07/11/23]     Copied from prior notes and reviewed with patient    Originally, diagnosed with depression and anxiety in 2015 but first experienced  symptoms of depression in middle school. He most recently had a significant worsening of symptoms in 2017 after he was diagnosed with rheumatoid arthritis, ankylosing spondylitis ended up with a knee injury and was no longer able to work or run which he used as his primary coping mechanism.     Pertinent Items Include: suicidal ideation, SIB [cutting], multiple psychotropic trials , severe med reaction, psych hosp (<3), SUBSTANCE USE: alcohol, substance use treatment  and Major Medical Problems (Rheumatoid Arthritis and Ankylosing Spondylitis)     Subjective   Les was last seen on 4/9/2024 during which the dose of quetiapine was increased.   Since last visit Jamison notes:  Has been pretty much the same, not as bad but every once in a while has odd dreams that he does not remember-, related to traumatic memories  The dreams and thoughts are not as bad, thoughts still come to mind every now and then, keeps telling self he has done every thing he could and there is nothing he could do to change it, just has to suffer through it- referring to the conflict situation with his mom    Last weekend went to see his wife's mother, they made some cookies and ate dinner- it was a good day, later it made him sad, wishes he could be doing that with mom, wishes mom would be different     Has zhang coping by being there for his mother in law and helping her make it through things although it makes him think about his situation     Mood- okay, feels low every once in a while, that he does not have that family connection of mom and dad, tries to be the best dad he could be and be there for his kids, it helps but does not take it go away completely, low moments last a couple of hours, last time felt low for several days or weeks is last Summer    Anxiety- has not been an issue at all for a while     Panic attacks- none for a while     Sleep-  still hit or miss, a lot of times wakes up several times, stays for a couple of hours, sometimes  "gets some good sleep and other times does not , sometimes related to dogs wanting to go outside     Has been helping wife with chores since she had a knee replacement, sometimes feels overwhelmed     SI/HI  - denies     Meds-    Quetiapine use - taking 150 mg at bedtime has been feeling drowsy in the early part of the morning     Pain-  ran out of RA meds, it took about a week to get the meds- started having a flare up of RA, just got the meds yesterday, it will take a few days to feel better, cauterization was painful, has been really sore after the procedure, getting better now     Therapy- someone reached out, has an appointment scheduled     Neuropsych eval - not yet      Recent Psych Symptoms:   Elevated:  none  Psychosis:  none    Current Social History:  Financial/occupational: On disability. Was planning to complete studies at [employer] for a degree in psychology- has a couple of credits left to complete degree; unable to work due to diagnosis of rheumatoid arthritis and ankylosing spondylitis   Living situation (partner, children, pets, etc): [place], house with wife and daughter 27 yo at home, son is in [place], two dogs - lost one  Social/spiritual support: Talks to  every two weeks, not many friends, family is supportive, likes walking and hiking  Feels safe at home: Yes    Pertinent Substance Use:   Alcohol: No   Cannabis: Yes: On medical cannabis-  2-3 gummies daily   Tobacco: No  Caffeine:  No   Opioids: No   Narcan Kit current: Yes  Other substances: none    Medical Review of Systems:   Lightheadedness/orthostasis: None  Headaches: less, happens once in a while   GI: None     Mental Status Exam     Alertness: alert  and oriented  Appearance: casually groomed  Behavior/Demeanor: cooperative, pleasant and calm, with fair  eye contact   Speech: normal and regular rate and rhythm  Language: no obvious problem  Psychomotor: normal or unremarkable  Mood:   \"depressed\"  Affect: restricted and " "appropriate; congruent to: mood- yes; content: no   Thought Content:  Reports none;  Denies suicidal ideation, violent ideation and delusions   Perception:  Reports none;  Denies hallucinations  Insight: adequate  Judgment: adequate for safety  Cognition: does  appear grossly intact; formal cognitive testing was not done  Gait and Station: N/A (telehealth)     Past Psych Med Trials     Copied from prior note and reviewed with patient    Medication  Dose   (mg) Effect  Dates of Use   Fluoxetine 40-80 Felt like was initially helpful 2016 - 2017 2022-2023   Sertraline 100 Effective initially, eventually self discontinued as not helpful 2018 - 7/2020   Duloxetine 30 BID Used to treat nerve pain; felt weird on it 2017   Bupropion  BID ineffective 2017 - 2019   Nortriptyline 50 For pain 2017 -  3/2019   Mirtazapine   Dissociated for entire first week after taking  Per MTM on 5/15/23: \"\"some kind of psychosis\". States that he was also taking oxycodone, alprazolam, fentanyl patches at the time\" 2012   Trazodone 50   2013             Hydroxyzine 25 QID prn For anxiety and panic attacks; not effective for severe anxiety 2015 - 2017   Buspirone 15 TID   2016 - 2017   Gabapentin 100 TID   2013 - 2016   Alprazolam 0.5 TID For anxiety [id number]   Diazepam 2 BID For anxiety 2016   Lorazepam 1 TID For anxiety 2016   Quetiapine  25-50   [id number]   Clonazepam 1 For anxiety 2016         Treatment Course and [place] Events Since   July 2023     -07/2023- Transfer of care-no med changes  -08/2023- Taper down and discontinue liothyronine  -10/2023- More depressed. Increased quetiapine from 25 mg at bedtime to 150 mg at bedtime; started melatonin  - 12/2023- Increased the dose of prazosin to 2 mg at bedtime   - 4/2024- Had been splitting quetiapine 150 mg into 4 because he was not sure that it was increased. Recommended to take quetiapine 150 mg at bedtime.   -4/2024- Reported daytime sedation with quetiapine 150 mg at bedtime. " Decreased quetiapine (Seroquel) from 150 mg at bedtime to 100 mg at bedtime and 25-50 mg daily PRN for anxiety       Vitals     There were no vitals taken for this visit.  Pulse Readings from Last 3 Encounters:   04/11/24 52   03/01/24 65   01/11/24 79     Wt Readings from Last 3 Encounters:   03/01/24 128.8 kg (284 lb)   12/08/22 131.5 kg (290 lb)   05/02/22 122.7 kg (270 lb 8 oz)     BP Readings from Last 3 Encounters:   04/11/24 129/87   03/01/24 (!) 140/86   01/11/24 117/75         Medical History     ALLERGIES: Mirtazapine, Hydrocodone, Mirtazapine, and Ms contin [morphine]    Patient Active Problem List   Diagnosis    CARDIOVASCULAR SCREENING; LDL GOAL LESS THAN 160    Obesity, Class I, BMI 30-34.9    Tear meniscus knee, right, initial encounter    Rheumatoid arthritis involving multiple sites, unspecified rheumatoid factor presence    Chronic pain    Right-sided thoracic back pain, unspecified chronicity    Generalized anxiety disorder    Panic attack    Ankylosing spondylitis (H)    Moderate major depression (H)    Immunocompromised (H24)    Essential hypertension with goal blood pressure less than 140/90    Suicidal ideation    Major depressive disorder, recurrent episode, severe with mixed features (H)    Chronic back pain greater than 3 months duration    Contact dermatitis and eczema    Dermatitis    Drug dependence (H)    Encounter for screening colonoscopy    Esophageal reflux    Hematuria    Hyperlipidemia    Lumbar radiculopathy    Plantar fascial fibromatosis    Sprain of sacroiliac region    Overweight    Nonintractable migraine    Narcotic abuse, continuous (H)    Morbid obesity (H)    Chronic, continuous use of opioids        Medications     Current Outpatient Medications   Medication Sig Dispense Refill    atenolol (TENORMIN) 25 MG tablet Take 1 tablet (25 mg) by mouth daily 90 tablet 3    atorvastatin (LIPITOR) 20 MG tablet Take 1 tablet (20 mg) by mouth daily 90 tablet 0    buprenorphine  (SUBUTEX) 8 MG SUBL sublingual tablet Place 1 tablet (8 mg) under the tongue 3 times daily Fill/start 03/26/24.  30 day script. 90 tablet 0    docusate sodium (COLACE) 100 MG capsule Take 1 capsule (100 mg) by mouth 2 times daily 60 capsule 3    FLUoxetine (PROZAC) 40 MG capsule Take 2 capsules (80 mg) by mouth daily 180 capsule 0    folic acid (FOLVITE) 1 MG tablet Take 3 tablets (3,000 mcg) by mouth daily 270 tablet 1    medical cannabis (Patient's own supply) See Admin Instructions (The purpose of this order is to document that the patient reports taking medical cannabis.  This is not a prescription, and is not used to certify that the patient has a qualifying medical condition.)     Pills PRN   Lozenges PRN      melatonin 3 MG tablet Take 1 tablet (3 mg) by mouth every evening . Take 3-4 hours before your desired bedtime. 90 tablet 0    methotrexate 2.5 MG tablet Take 8 tablets (20 mg) by mouth every 7 days 96 tablet 1    naloxone (NARCAN) 4 MG/0.1ML nasal spray Spray 1 spray (4 mg) into one nostril alternating nostrils as needed for opioid reversal every 2-3 minutes until assistance arrives 0.2 mL 1    ondansetron (ZOFRAN ODT) 4 MG ODT tab Take 1 tablet (4 mg) by mouth every 8 hours as needed for nausea 30 tablet 3    oxyCODONE (ROXICODONE) 5 MG tablet Take 1 tablet (5 mg) by mouth every 8 hours as needed (post procedure pain. ok to fill now.) 12 tablet 0    prazosin (MINIPRESS) 2 MG capsule Take 1 capsule (2 mg) by mouth at bedtime 90 capsule 0    [START ON 5/9/2024] QUEtiapine Fumarate 150 MG TABS Take 150 mg by mouth at bedtime 90 tablet 0    tiZANidine (ZANAFLEX) 4 MG tablet Take 1 tablet (4 mg) by mouth 3 times daily as needed for muscle spasms 3 month supply 225 tablet 1    XELJANZ XR 11 MG 24 hr tablet Take 1 tablet (11 mg) by mouth daily Hold for signs of infection, then seek medical attention. 90 tablet 0        Labs and Data         9/18/2023    10:31 AM 10/24/2023     9:47 AM 4/9/2024     9:30 AM    PROMIS-10 Total Score w/o Sub Scores   PROMIS TOTAL - SUBSCORES 14 15 16         6/12/2023     8:05 PM 7/12/2023     5:00 PM 11/20/2023     3:06 PM   CAGE-AID Total Score   Total Score 0 0 4   Total Score MyChart 0 (A total score of 2 or greater is considered clinically significant)           1/4/2024    10:44 AM 3/1/2024     8:27 AM 4/9/2024     9:28 AM   PHQ-9 SCORE   PHQ-9 Total Score MyChart 15 (Moderately severe depression) 13 (Moderate depression) 16 (Moderately severe depression)   PHQ-9 Total Score 15 13 16         6/12/2023     8:06 PM 7/12/2023     5:00 PM 11/20/2023     3:06 PM   JEFF-7 SCORE   Total Score 8 (mild anxiety)     Total Score 8 5 3     Recent Labs   Lab Test 03/01/24  1016 01/11/24  1106 12/08/22  1427 12/08/22  1425   *  --   --  96   A1C  --  5.5 5.5  --      Recent Labs   Lab Test 01/11/24  1106 12/08/22  1425   CHOL 185 224*   TRIG 66 230*   * 153*   HDL 41 25*     Recent Labs   Lab Test 03/01/24  1016 01/11/24  1106 05/17/23  1007 12/08/22  1425   AST 29 40   < > 32   ALT 29 49   < > 49   ALKPHOS 129  --   --  112    < > = values in this interval not displayed.     Recent Labs   Lab Test 01/11/24  1106 05/17/23  1007 12/08/22  1425 08/31/22  1405 10/11/21  1123   WBC 6.9 6.4 7.7 6.0 7.0   ANEUTAUTO  --   --  5.3 3.2 4.4   HGB 14.5 15.5 15.6 15.1 16.2    228 225 213 232          PROVIDER: Zain Hernández MD    Level of Medical Decision Making:   - At least 1 chronic problem that is not stable  - Engaged in prescription drug management during visit (discussed any medication benefits, side effects, alternatives, etc.)           Psychiatry Individual Psychotherapy Note   Psychotherapy start time -   Psychotherapy end time -   Date last reviewed with patient -   Subjective: This supportive psychotherapy session addressed issues related to goals of therapy and current psychosocial stressors. Patient's reaction: Action in the context of mental status appropriate for  ambulatory setting.  Progress: action  Interactive complexity indicated? No  Plan: RTC in timeframe noted above  Psychotherapy services during this visit included myself and the patient.   Treatment Plan      SYMPTOMS; PROBLEMS   MEASURABLE GOALS;    FUNCTIONAL IMPROVEMENT / GAINS INTERVENTIONS DISCHARGE CRITERIA   Depression: insomnia and avolition   Choose at least one enjoyable activity and start behavioral activation and improve sleep hygiene Supportive / psychodynamic marked symptom improvement         Patient staffed in clinic with Dr. Gardiner who will sign the note.  Supervisor is Dr. Hunter.

## 2024-04-30 NOTE — TELEPHONE ENCOUNTER
Signed Prescriptions:                        Disp   Refills    buprenorphine (SUBUTEX) 8 MG SUBL sublingu*90 tab*0        Sig: Place 1 tablet (8 mg) under the tongue 3 times daily           Fill/start 4/30/2024.  30 day script.  Authorizing Provider: SUSANA PETTY        Reviewed [place]  April 30, 2024- no concerning fills.    Susana GRANT, RN CNP, FNP  [facility]

## 2024-04-30 NOTE — PROGRESS NOTES
Virtual Visit Details    Type of service:  Video Visit   Video Start Time:  9:20 AM  Video End Time: 9:50 AM    Originating Location (pt. Location): Home    Distant Location (provider location):  On-site  Platform used for Video Visit: [place]

## 2024-05-13 NOTE — PROGRESS NOTES
Quality 47: Advance Care Plan: Advance Care Planning discussed and documented in the medical record; patient did not wish or was not able to name a surrogate decision maker or provide an advance care plan. Video- Visit Details  Type of service:  video visit for medication management  Time of service:    Video Start Time:9:30 am      Video End Time: 10:10 AM  Reason for video visit:  Patient convenience   Originating Site (patient location):  [facility] [place]   Location- Patient's home  Distant Site (provider location):  [facility]  Mode of Communication:  Video Conference via [employer]       [facility]  Psychiatry Clinic  MEDICAL PROGRESS NOTE     CARE TEAM:  PCP- Aiden Resendez    Psychotherapist- None       This person is a 57 year old who uses the name Jamison and pronouns he, him, his.      DIAGNOSIS     - Major depressive disorder, recurrent, moderate  - Generalized anxiety disorder, with panic  - PTSD     Medical Diagnoses:  - Chronic pain  - Rheumatoid arthritis  - Ankylosing spondylitis ([id number] positive)  - Hypertension     ASSESSMENT     Mr. Breen reports that he has been doing better. He feels that increasing the fluoxetine dose was helpful. His thoughts of wishing to be dead and not wanting to be alive improved. N    He self-discontinued prazosin to 2 mg due to sedation. He was not interested in restarting today. Prazosin has been helpful for sleep and nightmares previously. A sleep medicine referral was made previously; the contact number was shared with him several times to follow up on this referral. He continues to be interested in restarting therapy but has not contacted [facility] regarding his earlier referral.    He had a PCP visit last fall, received preventive counseling, and patient is aware of the weight management referral. Monitoring weight changes closely.     Denies suicidal ideation, any intent, or plan. No reports of medication side effects.     If his symptoms returns, we may consider titration from fluoxetine to an SNRI.     The patient understands the risks, benefits, adverse effects, and alternatives. Agrees to treatment with the  Quality 226: Preventive Care And Screening: Tobacco Use: Screening And Cessation Intervention: Patient screened for tobacco use and is an ex/non-smoker capacity to do so. No medical contraindications to treatment. Agrees to call the clinic for any problems. The patient understands to call 911 or come to the nearest ED if life-threatening or urgent symptoms present.    [facility] review was not needed today.     PLAN                                                                                                                1) Medications:   - Continue fluoxetine 80 mg daily  - Continue quetiapine 50 mg at bedtime  - Continue quetiapine 25-50 mg as needed for anxiety and panic (rarely taking 25 mg at bedtime)  - Continue liothyronine 25 mcg daily  - Self-discontinued prazosin 2 mg at bedtime (stopped due to sedation about a week ago)    Other:  - Suboxone  - Methotrexate     2) Therapy- Recommended   Patient is interested; psychotherapy; he agreed to follow up the previous referral order    3) Next Due   Labs - AP labs last done on 12/2022  EKG - last EKG 12/8/2022.  Rating scales- AIMS     4) Referral -   - Patient will follow up sleep medicine and psychotherapy referrals     5) Other - Nurse partner check-in two weeks     6) RTC: 4-6 weeks     7) Crisis Numbers:   Provided routinely in AVS   After hours:  [phone]     PERTINENT BACKGROUND                                                    [most recent eval 07/13/22]     Originally, diagnosed with depression and anxiety in 2015 but first experienced symptoms of depression in middle school. He most recently had a significant worsening of symptoms in 2017 after he was diagnosed with rheumatoid arthritis, ankylosing spondylitis ended up with a knee injury and was no longer able to work or run which he used as his primary coping mechanism.    Pertinent Items Include: suicidal ideation, SIB [cutting], multiple psychotropic trials , severe med reaction, psych hosp (<3), SUBSTANCE USE: alcohol, substance use treatment  and Major Medical Problems (Rheumatoid Arthritis and Ankylosing Spondylitis)     SUBJECTIVE     -   "Jamshid reports that his mood is better.   - Thoughts of wishing to be dead improved  - Self-discontinued prazosin due to sedation; wants to know how he does it without it. Will monitor sleep and nightmares  - Denies any change in substance use.   -Talking with his  regularly   - Still interested in psychotherapy; did not call sleep medicine   - Denies suicidal ideation, intent, or plan today; denies any safety concerns.    Recent Psych Symptoms:   Depression:  depressed mood, anhedonia, low energy, appetite changes, poor concentration /memory and overwhelmed; \"does not feel as bad,\" sxs improved   Elevated:  none  Psychosis:  none  Anxiety:  improved, no panic attacks since the last visit  Trauma Related:  nightmares, flashbacks, negative beliefs / emotions and hypervigilance, nightmares about child trauma, lately he does not remember  Sleep: Yes, 4-6 hrs average (see above).    Adverse Effects:  denies  Pertinent Negative Symptoms: No suicidal ideation, violent ideation, aggression, psychosis, hallucinations, delusions, otto and hypomania    Recent Substance Use:     Alcohol- none since , used to attend AA meetings prior to COVID, no plans to start  Tobacco- denies  Caffeine- no, Mr.   Cannabis- denies     Opioids- as prescribed           Narcan Kit- prescribed   Other illicit drugs- none     FAMILY and SOCIAL HISTORY                                 pt reported     Family Hx:  Mom - depression (since  when sister )     Social Hx:  Financial/ Work- planning to attend [employer] for a degree in psychology; unable to work due to diagnosis of rheumatoid arthritis and ankylosing spondylitis, on disability   Partner/ -  in   Children- 26 and 28 year old  Living situation- [place], house with wife and daughter 25 yo at home, son is in [place], three dogs    Social/ Spiritual Support- Talks to  every two weeks, not many friends, family is supportive, likes walking and " Detail Level: Detailed hiking     PAST PSYCHIATRIC HISTORY     SIB- Hx of cutting/carving of skin   Suicide Attempt [#, most recent]- No   Suicidal Ideation Hx- No   Violence/Aggression Hx- No, not now  Psychosis Hx- No   Eating Disorder Hx- No   Psych Hosp [#, most recent]- Yes, 3-4 times, 10 days longest  Commitment- No   TMS/ECT- No   Outpatient Programs - Yes, CD treatment at [facility], in patient and outpatient program     PAST MED TRIALS       Medication  Dose   (mg) Effect  Dates of Use   Fluoxetine 40-80 Felt like was initially helpful 2016 - 2017 2022-2023   Sertraline 100 Effective initially, eventually self discontinued as not helpful 2018 - 7/2020   Duloxetine 30 BID Used to treat nerve pain; felt weird on it 2017   Bupropion  BID ineffective 2017 - 2019   Nortriptyline 50 For pain 2017 -  3/2019   Mirtazapine   Dissociated for entire first week after taking 2012   Trazodone 50   2013             Hydroxyzine 25 QID prn For anxiety and panic attacks; not effective for severe anxiety 2015 - 2017   Buspirone 15 TID   2016 - 2017   Gabapentin 100 TID   2013 - 2016   Alprazolam 0.5 TID For anxiety 6380-0426   Diazepam 2 BID For anxiety 2016   Lorazepam 1 TID For anxiety 2016   Quetiapine  25-50  5114-3106   Clonazepam 1 For anxiety 2016        PSYCH CRITICAL SUMMARY POINTS         [From Dr. Sage's Notes on 6/8/2022:  9/20 - started escitalopram  11/20 - increased escitalopram 20mg daily  2/21 - stopped hydroxyzine; started trazodone 50mg at bedtime and 25mg daily as needed for anxiety  3/21 - 4/22 - hospitalized started on Abilify, titrated to 5mg daily; escitalopram switched to fluoxetine 40mg daily; started prazosin 1mg at bedtime. Pain management switched oxycodone to Suboxone and started medical marijuana.  5/21  - patient still taking hydroxyzine, discontinued today.  6/21  - patient hospitalized (6/15/21) discontinued Abilify and trazodone, started on quetiapine & titrated to 100mg at bedtime and 25mg as needed for  sleep.   8/21 - increase quetiapine 150mg   11/21  - discontinued quetiapine 150mg (patient ran out of medications and did not take for several weeks).  12/21 - patient restarted quetiapine 50mg at bedtime   3/22 - increased fluoxetine 60mg]    7/14/2022: Transfer visit.  Changed prazosin from PRN to scheduled at bedtime  11/3/2022: No medication changes. Elevated BP history discussed. Patient agreed to make a PCP appointment.   12/1/2022: No medication changes.   1/5/2023:  Increased prazosin to 2 mg at bedtime  2/9/2023:  Return of depressive symptoms after her daughter moved out from the family home. Increased fluoxetine from 60 mg daily to 80 mg daily.  3/2/2023:  Patient self-discontinued prazosin 2 mg due sedation.  No other changes to medications. Depression improved.      MEDICAL HISTORY and ALLERGY     ALLERGIES: Mirtazapine, Hydrocodone, Ms contin [morphine], and Remeron soltab    Patient Active Problem List   Diagnosis     CARDIOVASCULAR SCREENING; LDL GOAL LESS THAN 160     Obesity, Class I, BMI 30-34.9     Tear meniscus knee, right, initial encounter     Rheumatoid arthritis involving multiple sites, unspecified rheumatoid factor presence     Chronic pain     Right-sided thoracic back pain, unspecified chronicity     Generalized anxiety disorder     Panic attack     Ankylosing spondylitis (H)     Moderate major depression (H)     Immunocompromised (H)     Essential hypertension with goal blood pressure less than 140/90     Suicidal ideation     Insomnia due to other mental disorder     Major depressive disorder, recurrent episode, severe with mixed features (H)     Chronic back pain greater than 3 months duration     Contact dermatitis and eczema     Dermatitis     Drug dependence (H)     Encounter for screening colonoscopy     Esophageal reflux     Hematuria     Hyperlipidemia     Lumbar radiculopathy     Plantar fascial fibromatosis     Sprain of sacroiliac region     Overweight     Nonintractable  migraine     Narcotic abuse, continuous (H)     Morbid obesity (H)        MEDICAL REVIEW OF SYSTEMS     Contraception- none    A comprehensive review of systems was performed and is negative other than noted in the HPI.      MEDICATIONS     Current Outpatient Medications   Medication Sig Dispense Refill     FLUoxetine (PROZAC) 40 MG capsule Take 2 capsules (80 mg) by mouth daily 60 capsule 1     liothyronine (CYTOMEL) 25 MCG tablet Take 1 tablet (25 mcg) by mouth daily 30 tablet 1     QUEtiapine (SEROQUEL) 50 MG tablet Take 1 tablet (50 mg) by mouth At Bedtime May take additional 25-50mg (1/2 - 1 tablet) daily prn for anxiety/panic attacks 45 tablet 1     atorvastatin (LIPITOR) 20 MG tablet Take 1 tablet (20 mg) by mouth daily 90 tablet 1     buprenorphine (SUBUTEX) 8 MG SUBL sublingual tablet Place 0.5-1 tablets (4-8 mg) under the tongue 4 times daily Max 2 tabs per day. Fill/start 03/02/23. 60 tablet 0     etanercept (ENBREL SURECLICK) 50 MG/ML autoinjector Inject 50 mg Subcutaneous once a week 1 mL 3     folic acid (FOLVITE) 1 MG tablet Take 3 tablets (3 mg) by mouth daily 270 tablet 2     medical cannabis (Patient's own supply) See Admin Instructions (The purpose of this order is to document that the patient reports taking medical cannabis.  This is not a prescription, and is not used to certify that the patient has a qualifying medical condition.)     Pills PRN   Lozenges PRN       methotrexate 2.5 MG tablet Take 6 tablets (15 mg) by mouth every 7 days 72 tablet 0     oxyCODONE (ROXICODONE) 5 MG tablet Take 1 tablet (5 mg) by mouth every 6 hours as needed for severe pain (7-10) Max of 3/day. Fill/begin 12/13/22. Short term script for acute pain flare. 42 tablet 0     prazosin (MINIPRESS) 2 MG capsule Take 1 capsule (2 mg) by mouth At Bedtime 30 capsule 1     vitamin D2 (ERGOCALCIFEROL) 92811 units (1250 mcg) capsule TAKE 1 CAPSULE BY MOUTH EVERY MONDAY AND THURSDAY        VITALS   There were no vitals taken for  "this visit.     Pulse Readings from Last 5 Encounters:   12/08/22 89   11/23/22 72   09/22/22 66   08/31/22 65   05/02/22 72     Wt Readings from Last 5 Encounters:   12/08/22 131.5 kg (290 lb)   05/02/22 122.7 kg (270 lb 8 oz)   03/16/22 125.3 kg (276 lb 3.2 oz)   02/16/22 122.5 kg (270 lb)   12/30/21 123 kg (271 lb 2 oz)     BP Readings from Last 5 Encounters:   12/08/22 133/85   11/23/22 138/85   09/22/22 (!) 176/88   08/31/22 (!) 154/95   05/02/22 (!) 163/90        MENTAL STATUS EXAM     Alertness: alert , oriented and sleepy  Appearance: casually groomed, limited obs by video visit  Behavior/Demeanor: cooperative, pleasant and calm, with poor eye contact   Speech: normal and regular rate and rhythm  Language: intact and no problems  Psychomotor: normal or unremarkable and based on video visit, no abnormal moveemnts or tics were observed  Mood: \"not as bad\"   Affect: guarded; congruent to: mood- yes, content- yes  Thought Process/Associations: unremarkable  Thought Content:  Reports none;  Denies suicidal & violent ideation and delusions, denies any plan or intent  Perception:  Reports none;  Denies auditory hallucinations and visual hallucinations  Insight: good  Judgment: fair  Cognition: does  appear grossly intact; formal cognitive testing was not done  Gait and Station: N/A ([facility])     LABS and DATA     PHQ 12/8/2022 1/5/2023 2/9/2023   PHQ-9 Total Score 14 18 17   Q9: Thoughts of better off dead/self-harm past 2 weeks Not at all Not at all Several days   F/U: Thoughts of suicide or self-harm - - No   F/U: Self harm-plan - - -   F/U: Self-harm action - - -   F/U: Safety concerns - - No       Recent Labs   Lab Test 12/08/22  1425 08/31/22  1405   CR 0.98 0.94   GFRESTIMATED >90 >90     Recent Labs   Lab Test 12/08/22  1425 08/31/22  1405 10/11/21  1123   AST 32 35 37   ALT 49 46 47   ALKPHOS 112  --  102       Recent Labs   Lab Test 12/08/22  1427 12/08/22  1425 10/11/21  1123   GLC  --  96 97   A1C 5.5 "  --  5.2     Recent Labs   Lab Test 12/08/22  1425 10/11/21  1123   CHOL 224* 209*   TRIG 230* 225*   * 134*   HDL 25* 30*     Recent Labs   Lab Test 12/08/22  1425 08/31/22  1405 06/16/21  0741 03/21/21  0808 03/17/21  0748   WBC 7.7 6.0   < > 6.8 8.3   ANEU  --   --   --  3.7 4.4   HGB 15.6 15.1   < > 15.6 15.0    213   < > 246 211    < > = values in this interval not displayed.     TSH   Date Value Ref Range Status   12/08/2022 1.19 0.40 - 4.00 mU/L Final   05/02/2022 1.46 0.40 - 4.00 mU/L Final   10/11/2021 1.37 0.40 - 4.00 mU/L Final   06/16/2021 0.65 0.40 - 4.00 mU/L Final   03/21/2021 2.39 0.40 - 4.00 mU/L Final   03/17/2021 2.01 0.40 - 4.00 mU/L Final   09/30/2016 1.62 0.40 - 4.00 mU/L Final     ECG 6/15/2021 QTc = 413 ms    EKG on 12/8/2022 QTc 403 ms  Sinus  Rhythm   -Incomplete right bundle branch block.    -Left atrial enlargement.      PSYCHOTROPIC DRUG INTERACTIONS                                                       PSYCHCLINICDDI     Increased risk of QTc prolongation: fluoxetine, quetiapine, Suboxone  Increased risk of serotonin syndrome: fluoxetine, Suboxone  Increased risk of CNS/respiratory depression: Suboxone, quetiapine    Prazosin and buprenorphine may have additive effects in lowering your blood pressure.    MANAGEMENT:  Monitoring for adverse effects and patient is aware of risks     RISK STATEMENT for SAFETY     Mr. Breen did not appear to be an imminent safety risk to self or others.     TREATMENT RISK STATEMENT: The risks, benefits, alternatives and potential adverse effects have been discussed and are understood by the pt. The pt understands the risks of using street drugs or alcohol. There are no medical contraindications, the pt agrees to treatment with the ability to do so. The pt knows to call the clinic for any problems or to access emergency care if needed.  Medical and substance use concerns are documented above.  Psychotropic drug interaction check was done,  including changes made today.     PROVIDER: Katie Corley MD, PhD    Patient not staffed in clinic.  Note will be reviewed and signed by supervisor Dr. Hunter.

## 2024-05-20 ENCOUNTER — LAB (OUTPATIENT)
Dept: LAB | Facility: CLINIC | Age: 59
End: 2024-05-20
Payer: COMMERCIAL

## 2024-05-20 ENCOUNTER — OFFICE VISIT (OUTPATIENT)
Dept: PALLIATIVE MEDICINE | Facility: CLINIC | Age: 59
End: 2024-05-20
Attending: NURSE PRACTITIONER
Payer: COMMERCIAL

## 2024-05-20 VITALS — SYSTOLIC BLOOD PRESSURE: 134 MMHG | HEART RATE: 52 BPM | DIASTOLIC BLOOD PRESSURE: 87 MMHG

## 2024-05-20 DIAGNOSIS — M62.838 MUSCLE SPASM: ICD-10-CM

## 2024-05-20 DIAGNOSIS — M51.369 DDD (DEGENERATIVE DISC DISEASE), LUMBAR: ICD-10-CM

## 2024-05-20 DIAGNOSIS — Z79.891 ENCOUNTER FOR LONG-TERM USE OF OPIATE ANALGESIC: ICD-10-CM

## 2024-05-20 DIAGNOSIS — M25.50 MULTIPLE JOINT PAIN: ICD-10-CM

## 2024-05-20 DIAGNOSIS — F11.90 CHRONIC, CONTINUOUS USE OF OPIOIDS: ICD-10-CM

## 2024-05-20 DIAGNOSIS — F11.20 UNCOMPLICATED OPIOID DEPENDENCE (H): ICD-10-CM

## 2024-05-20 DIAGNOSIS — M79.18 MYOFASCIAL PAIN: ICD-10-CM

## 2024-05-20 DIAGNOSIS — M47.817 SPONDYLOSIS WITHOUT MYELOPATHY OR RADICULOPATHY, LUMBOSACRAL REGION: ICD-10-CM

## 2024-05-20 DIAGNOSIS — M05.79 RHEUMATOID ARTHRITIS INVOLVING MULTIPLE SITES WITH POSITIVE RHEUMATOID FACTOR (H): ICD-10-CM

## 2024-05-20 DIAGNOSIS — M54.59 LUMBAR FACET JOINT PAIN: Primary | ICD-10-CM

## 2024-05-20 DIAGNOSIS — M45.7 ANKYLOSING SPONDYLITIS OF LUMBOSACRAL REGION (H): ICD-10-CM

## 2024-05-20 PROCEDURE — 99214 OFFICE O/P EST MOD 30 MIN: CPT | Performed by: NURSE PRACTITIONER

## 2024-05-20 PROCEDURE — 80307 DRUG TEST PRSMV CHEM ANLYZR: CPT

## 2024-05-20 PROCEDURE — G0481 DRUG TEST DEF 8-14 CLASSES: HCPCS

## 2024-05-20 RX ORDER — BUPRENORPHINE 8 MG/1
8 TABLET SUBLINGUAL 3 TIMES DAILY
Qty: 90 TABLET | Refills: 0 | Status: SHIPPED | OUTPATIENT
Start: 2024-05-20 | End: 2024-07-05

## 2024-05-20 ASSESSMENT — PAIN SCALES - GENERAL: PAINLEVEL: EXTREME PAIN (8)

## 2024-05-20 NOTE — PATIENT INSTRUCTIONS
Plan:    Check out Shmuel Murray online for gentle exercise  Physical Therapy:  Referral to PHYSICAL THERAPY  Check out Shmuel Murray on YouTube. , easy, gentle exercises 30-90 seconds with excellent technique  Clinical Health Psychologist: continue work with your psychiatrist and therapist  Diagnostic Studies:  None  Medication Management:    continue Subutex 8mg  three times per day starting today. Next script fill 5/28 and start 5/30  Continue medical cannabis, re-cert today  continue tizanidine 2-4mg three times daily as needed for muscle spasms  Continue medical cannabis  Further procedures recommended: none  UDT today  Last used Buprenorphine this morning  Resigned CSA today  Recommendations to PCP: see above   Follow up:12 weeks in-person/virtual. Please call [phone] to make your follow-up appointment with me.     ----------------------------------------------------------------  Clinic Number:  [phone]   Call with any questions about your care and for scheduling assistance.   Calls are returned Monday through Friday between 8 AM and 4:30 PM. We usually get back to you within 2 business days depending on the issue/request.    If we are prescribing your medications:  For opioid medication refills, call the clinic or send a [employer] message 7 days in advance.  Please include:  Name of requested medication  Name of the pharmacy.  For non-opioid medications, call your pharmacy directly to request a refill. Please allow 3-4 days to be processed.   Per [place] State Law:  All controlled substance prescriptions must be filled within 30 days of being written.    For those controlled substances allowing refills, pickup must occur within 30 days of last fill.      We believe regular attendance is key to your success in our program!    Any time you are unable to keep your appointment we ask that you call us at least 24 hours in advance to cancel.This will allow us to offer the appointment time to  another patient.   Multiple missed appointments may lead to dismissal from the clinic.

## 2024-05-20 NOTE — LETTER

## 2024-05-20 NOTE — PROGRESS NOTES
[facility]      5/20/2024        Chief complaint:   -Bilateral low back pain right > Left. Denies radiation into the legs  -Hands, wrists, shoulders, elbows, and bilateral knee pain  -Joint pain is the most bothersome from RA  -seeing new rheumatologist      Interval history:   Jailene Breen is a 58 year old male is known to me for   Lumbar spondylosis, pain is worse with extension and rotation indicating a facetogenic component to pain  Lumbar degenerative disc disease  SI joint pain  Ankylosing spondylitis  Myofascial pain  Chronic pain syndrome  PMHx includes: Panic attacks, back pain, arthritis, anxiety, ankylosing spondylitis  PSHx includes: Right knee surgery ×2, left foot surgery right knee arthroscopy        Recommendations/plan at the last visit on 2/19/2024 included:  Check out Shmuel Murray online for gentle exercise  Physical Therapy:  Referral to PT  Clinical Health Psychologist: continue work with your psychiatrist and therapist  Diagnostic Studies:  None  Medication Management:    continue Subutex 8mg  three times per day starting today. Next script to be fill/start 2/19  Continue medical cannabis, re-cert today  continue tizanidine 2-4mg three times daily as needed for muscle spasms  Short term oxycodone script max of 3/day, fill and begin 2/19/2024. 7 day supply for acute on chronic pain in the setting of not feeling well and off of antirheumatological medications  Further procedures recommended: repeat lumbar RFA, our office will call you  Recommendations to PCP: see above   Follow up:12 weeks in-person. Please call [phone] to make your follow-up appointment with me.       Since his last visit, Jailene Breen reports:    Interval history May 20, 2024  -his bilateral low back pain without radiation has improved a little bit  -pain is worse after using the riding .  -helping his wife, she had knee replacement surgery.   Bilateral low back pain  "right > Left. Denies radiation into the legs  -Hands, wrists, shoulders, elbows, and bilateral knee pain  -Joint pain is the most bothersome from RA  -seeing new rheumatologist      Interval history February 19, 2024  -he has not been sleeping well.   -he has issues with nausea, he will feel hungry and then when he gets his food he will feel nausea and \"feel pukey.\"  -his wife had knee replacement surgery about 2 weeks ago.  -they have a dog that was recently fixed and he has been the one caring for the dog.   -he does use medical cannabis and he does feel that this is somewhat helpful for his nausea and helpful for pain.   -he is not on anything for reducing acid production in his gut, he states that his nausea and vomiting feels very acidic.   -he desires a repeat of lumbar RFA as his low back pain is worse, especially with lumbar extension and ext/rotation to the right and to the left.   -has been transitioned to Xeljanz but has not been able to take it regularly due to not feeling well.      Interval history November 15, 2023  -currently had COVID infection  -pain is worse right now as he cannot take his antirheumatological meds as he has COVID.   -pain is worse everywhere as he is sick and feverish and his joints are more painful.     Interval history August 9, 2023  -he is in an RA flare. He did not qualify for financial assistance for the [place]. He has an upcoming appt with his Rheumatologist next week.    -joint pain is the most bothersome.   -low back pain is pretty good right now  -has a new [place] young dog and is more active with her.   -he had to put his older GSD down in early July, this was difficult.     Interval history May 17, 2023  -having a lot of pain, he is off of Rheumatological medication. Will be starting a new med but needs to sign up for financial assistance first. Med likely to be certolizumab pegol.  -discussed 2 cancelled appts in a row and a no show in April. Stressed " "importance of being seen at least quarterly while on opiate medication  -multiple joint pain and pain all over is the most bothersome   -he is off of Enbrel now        At this point, the patient's participation with our multidisciplinary team includes:  The patient has been compliant with the program.  PT - last PHYSICAL THERAPY with Asif Saldana on 12/23/2020  Health Psych - worked with  Padilla Keene, last on 11/6/2018.  Working with Jonna Farrell PhD and been seen >8 times. Last visit with Santa was on 9/2/2020         Pain scores:    Pain intensity on average is 6 on a scale of 0-10.    Range is 4-9/10. When hi pain is bad, it can stay elevated for a few days  Pain right now is 8/10.   Pain is described as \"sharp, throbbing, stabbing, burning, stiffness.\"  Pain is constant in nature      Current pain relevant medications:      -nortriptyline 50mg at bedtime (helpful)  -ibuprofen 800mg TID PRN (using 3 times daily-helpful)  -Tylenol 500mg using 1000mg TID (unsure if helpful)  -Maxalt 10mg PRN migraine (helpful for migraine--he does have visual aura)  -naloxone nasal spray PRN for accidental opiate overdose  -Subutex 8 mg TID (somewhat helpful)  -medical cannabis PRN (helpful)      Other pertinent medications:   -Fluoxetine 80mg every day (somewhat helpful, managed by psychiatry)  -quetiapine 150mg at bedtime  -Xeljanz 11mg every 24 hours for RA (he was able to start this short term but had to stop this again due to illness)      Previous Medications:   OPIATES: Oxycodone (helpful), Fentanyl (100mcg/hr patch helpful), hydrocodone (itching), Tramadol (not helpful), Suboxone (somewhat helpful)  NSAIDS: ibuprofen (not helpful), Aleve (not helpful)  MUSCLE RELAXANTS: methocarbamol (somewhat helpful), Flexeril (somewhat helpful but caused severe urinary retention requiring catheterization), tizanidine (unsure if helpful), metaxalone (unsure), SOMA (helpful), chlorzoxazone (helped some)  ANTI-MIGRAINE MEDS: " Maxalt (helpful for migraine), Imitrex injection (somewhat helpful)  ANTI-DEPRESSANTS: Prozac (helpful), Cymbalta (felt weird on it), nortriptyline (unsure if helpful), Buspar (unsure), Remeron (blacked out), bupropion (not helpful for smoking cessation)  SLEEP AIDS: Ambien (helpful)  ANTI-CONVULSANTS: gabapentin (felt odd on this medication, unsure of dose), Lyrica (not helpful for 4 months, unsure of dose), Topamax (not helpful, he felt weird on it)   TOPICALS: Lidocaine patches (not helpful), Voltaren gel (not helpful)  Other meds: Tylenol (unsure if helpful)        Other treatments have included:   Jailene Breen has been seen at a pain clinic in the past.  [facility]  PT: tried, not helpful  Chiropractic: tried, not helpful  Acupuncture: none  TENs Unit: tried, helpful         Injections:     -has had injections at outside clinics  -has had epidural injections for low back pain (one was helpful)  -he has had lumbar medial branch blocks at [facility] and he was going to have lumbar radiofrequency ablation (did not do this due to cost)  -4/3/2017 right LMBBs with Dr. Ashlyn Gamble (helpful)  -4/26/2017 right LMBBs with Dr. Ashlyn Gamble (helpful)  -5/31/2017 right L3, L4, L5 RFA with Dr. Ashlyn Gamble (good relief for over 6 months)  -3/15/2018 right L5 transforaminal MAKAYLA with Dr. Ashlyn Gamble (not helpful)  -5/10/2018 trigger point injections with Dr. Ashlyn Gamble(somewhat helpful).  -7/12/2018 Right L3, L4, L5 RFA with Dr. Ashlyn Gamble (helpful).  -9/20/2018 Left piriformis injections, bilateral gluteal trigger point with Dr. Gamble (helpful).  -1/31/2019 right L2-3, L3-4 and L4-5 facet joint injections with Dr. Ashlyn Gamble (somewhat helpful)  4/4/2019  right L3, L4, L5/sacral ala lumbar radiofrequency ablation with Dr. Gamble (helpful over right side)  6/28/2019 Bilateral facet joint injections at l4-5, L5-S1 with Dr. Holley (only helpful for 7 days)  -2/12/2020 right T1-H6-C3-sacral  "ALA RFA done with Dr. Ashlyn Gamble (helpful)  -8/26/2020 left L3-5 lumbar medial branch blocks #1 with Dr. Ashlyn Gamble (very helpful)  -11/11/2020 left L3-5 lumbar medial branch blocks #2 with Dr. Ashlyn Gamble  (helpful)   -2/1/2021 bilateral lumbar RFA L4-5 and L5-S1 with Dr. Ashlyn Gamble   (this \"helped a little bit\" and then he tweaked his back about 2 weeks after it was done and it wasn't helpful)  -09/22/2022 lumbosacral RFA L4, L5 and sacral ala with Dr. Aguilar Holley (helpful,, at least 50% relief, he has a RA flare right now, so harder to tell)  -7/20/2023 bilateral lumbar RFA at L4, L5 and Sacral ala with Dr. Aguilar Holley (helpful, has reduced pain by at least 50-60% in the lower back)  -4/11/2024 bilateral lumbar RFA at L4, L5 and S1 by Dr. Aguilar Holley (has reduced pain by at least 50% about 5 weeks post-procedure. Can take up to 8 weeks to reach maximum improvement)      THE 4 A's OF OPIOID MAINTENANCE ANALGESIA    Analgesia: Subutex is helpful    Activity: ADLs, caring for his wife who had knee replacement surgery and for his young [place] Dog    Adverse effects: none    Adherence to Rx protocol: yes          Side Effects: no side effect   Patient is using the medication as prescribed: YES     Medications:  Current Outpatient Medications   Medication Sig Dispense Refill    atenolol (TENORMIN) 25 MG tablet Take 1 tablet (25 mg) by mouth daily 90 tablet 3    atorvastatin (LIPITOR) 20 MG tablet Take 1 tablet (20 mg) by mouth daily 90 tablet 0    buprenorphine (SUBUTEX) 8 MG SUBL sublingual tablet Place 1 tablet (8 mg) under the tongue 3 times daily Fill/start 4/30/2024.  30 day script. 90 tablet 0    docusate sodium (COLACE) 100 MG capsule Take 1 capsule (100 mg) by mouth 2 times daily 60 capsule 3    FLUoxetine (PROZAC) 40 MG capsule Take 2 capsules (80 mg) by mouth daily 180 capsule 0    folic acid (FOLVITE) 1 MG tablet Take 3 tablets (3,000 mcg) by mouth daily 270 tablet 1    " medical cannabis (Patient's own supply) See Admin Instructions (The purpose of this order is to document that the patient reports taking medical cannabis.  This is not a prescription, and is not used to certify that the patient has a qualifying medical condition.)     Pills PRN   Lozenges PRN      melatonin 3 MG tablet Take 1 tablet (3 mg) by mouth every evening . Take 3-4 hours before your desired bedtime. 90 tablet 0    methotrexate 2.5 MG tablet Take 8 tablets (20 mg) by mouth every 7 days 96 tablet 1    naloxone (NARCAN) 4 MG/0.1ML nasal spray Spray 1 spray (4 mg) into one nostril alternating nostrils as needed for opioid reversal every 2-3 minutes until assistance arrives 0.2 mL 1    ondansetron (ZOFRAN ODT) 4 MG ODT tab Take 1 tablet (4 mg) by mouth every 8 hours as needed for nausea 30 tablet 3    prazosin (MINIPRESS) 2 MG capsule Take 1 capsule (2 mg) by mouth at bedtime 90 capsule 0    QUEtiapine (SEROQUEL) 100 MG tablet Take 1 tablet (100 mg) by mouth at bedtime 30 tablet 1    QUEtiapine (SEROQUEL) 25 MG tablet Take 1-2 tablets (25-50 mg) by mouth daily as needed (Anxiety) 60 tablet 0    tiZANidine (ZANAFLEX) 4 MG tablet Take 1 tablet (4 mg) by mouth 3 times daily as needed for muscle spasms 3 month supply 225 tablet 1    XELJANZ XR 11 MG 24 hr tablet Take 1 tablet (11 mg) by mouth daily Hold for signs of infection, then seek medical attention. 90 tablet 0    oxyCODONE (ROXICODONE) 5 MG tablet Take 1 tablet (5 mg) by mouth every 8 hours as needed (post procedure pain. ok to fill now.) (Patient not taking: Reported on 5/20/2024) 12 tablet 0       Medical History: any changes in medical history since they were last seen? No    Social History:    Home situation: , has 2 grown children  Occupation/Schooling: currently unemployed, worked as a  (unemployed since 3/1/2017). 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  Uses nicotine gum  If yes, was cessation counseling offered?  None needed        Physical Exam:     Vital signs: /87   Pulse 52      Behavioral observations:  Awake, alert, conversant and cooperative     Gait:  slow    Musculoskeletal exam:  strength grossly equal throughout. Moves slowly in the exam room    Neuro exam:  deferred    Skin/vascular/autonomic:  No suspicious lesions on exposed skin.     Other:  na           [place] Prescription [place]:  Reviewed [place] [facility] 5/20/2024- no concerning fills.  Susana GRANT, RN CNP, FNP  [facility]  [facility]          DIRE Score for selecting candidates for long term opioid analgesia for chronic pain:  Diagnosis  2  Intractablility  2  Risk    Psychological health  1    Chemical health  2    Reliability  2    Social support  2  Efficacy  1  Total DIRE Score = 12. Note that  7-13 predicts poor outcome (compliance and efficacy) from opioid prescribing; 14-21 predicts good outcome (compliance and efficacy)  from opioid prescribing        Assessment:    Lumbar facet joint pain  Spondylosis of lumbar region without myelopathy or radiculopathy  Degenerative disc disease of the lumbar spine  Ankylosing spondylitis of lumbosacral region  Rheumatoid arthritis involving multiple joints with positive rheumatoid factor  Multiple joint pain  Muscle spasm  Myofascial pain  Uncomplicated opiate dependence  Chronic continuous use of opioids  Encounter for long-term use of opiate analgesic  UDT 5/20/2024  Signed CSA 5/20/2024  PMHx includes: Panic attacks, back pain, arthritis, anxiety, ankylosing spondylitis  PSHx includes: Right knee surgery ×2, left foot surgery right knee arthroscopy          Plan:    Check out Shmuel Murray online for gentle exercise  Physical Therapy:  Referral to PHYSICAL  THERAPY  Check out Shmuel Murray on YouTube. , easy, gentle exercises 30-90 seconds with excellent technique  Clinical Health Psychologist: continue work with your psychiatrist and therapist  Diagnostic Studies:  None  Medication Management:    continue Subutex 8mg  three times per day starting today. Next script fill 5/28 and start 5/30  Continue medical cannabis, re-cert today  continue tizanidine 2-4mg three times daily as needed for muscle spasms  Continue medical cannabis  Further procedures recommended: none  UDT today  Last used Buprenorphine this morning  Resigned CSA today  Recommendations to PCP: see above   Follow up:12 weeks in-person/virtual. Please call [phone] to make your follow-up appointment with me.           ASSESSMENT AND PLAN:  (M54.59) Lumbar facet joint pain  (primary encounter diagnosis)  Comment:   Plan: buprenorphine (SUBUTEX) 8 MG SUBL sublingual         tablet, Adult Pain Clinic Follow-Up Order            (M47.817) Spondylosis without myelopathy or radiculopathy, lumbosacral region  Comment:   Plan: buprenorphine (SUBUTEX) 8 MG SUBL sublingual         tablet, Adult Pain Clinic Follow-Up Order            (M51.36) DDD (degenerative disc disease), lumbar  Comment:   Plan: buprenorphine (SUBUTEX) 8 MG SUBL sublingual         tablet, Adult Pain Clinic Follow-Up Order            (M45.7) Ankylosing spondylitis of lumbosacral region (H)  Comment:   Plan: buprenorphine (SUBUTEX) 8 MG SUBL sublingual         tablet, Adult Pain Clinic Follow-Up Order            (M05.79) Rheumatoid arthritis involving multiple sites with positive rheumatoid factor (H)  Comment:   Plan: buprenorphine (SUBUTEX) 8 MG SUBL sublingual         tablet, Adult Pain Clinic Follow-Up Order            (M25.50) Multiple joint pain  Comment:   Plan: buprenorphine (SUBUTEX) 8 MG SUBL sublingual         tablet, Adult Pain Clinic Follow-Up Order            (M62.838) Muscle spasm  Comment:   Plan: Adult Pain  Clinic Follow-Up Order            (M79.18) Myofascial pain  Comment:   Plan: Adult Pain Clinic Follow-Up Order            (F11.20) Uncomplicated opioid dependence (H)  Comment:   Plan: Ethanol urine, Ethyl Glucuronide Screen with         Reflex to Confirmation, Urine, Drug         Confirmation Panel Urine with Creatinine,         buprenorphine (SUBUTEX) 8 MG SUBL sublingual         tablet, Adult Pain Clinic Follow-Up Order            (F11.90) Chronic, continuous use of opioids  Comment:   Plan: Ethanol urine, Ethyl Glucuronide Screen with         Reflex to Confirmation, Urine, Drug         Confirmation Panel Urine with Creatinine,         buprenorphine (SUBUTEX) 8 MG SUBL sublingual         tablet, Adult Pain Clinic Follow-Up Order            (Z79.891) Encounter for long-term use of opiate analgesic  Comment:   Plan: Ethanol urine, Ethyl Glucuronide Screen with         Reflex to Confirmation, Urine, Drug         Confirmation Panel Urine with Creatinine, Adult        Pain Clinic Follow-Up Order              Face to face time with patient: 19 minutes                            Susana GRANT RN CNP, FNP  [facility]  [facility]

## 2024-05-21 LAB
CREAT UR-MCNC: 195 MG/DL
ETHANOL UR QL SCN: NORMAL
ETHYL GLUCURONIDE UR QL SCN: NEGATIVE NG/ML

## 2024-05-21 NOTE — RESULT ENCOUNTER NOTE
No evidence for recent alcohol ingestion while on opiate medication. This is as expected.   Susana GRANT, RN CNP, FNP  [facility]

## 2024-05-22 LAB
BUPRENORPHINE UR CFM-MCNC: 956 NG/ML
BUPRENORPHINE/CREAT UR: 490 NG/MG {CREAT}
NORBUPRENORPHINE UR CFM-MCNC: 1355 NG/ML
NORBUPRENORPHINE/CREAT UR: 695 NG/MG {CREAT}

## 2024-05-22 NOTE — RESULT ENCOUNTER NOTE
Urine drug testing as expected. No illicit medication or alcohol noted. Continue standard monitoring while on opiates.   Susana GRANT RN CNP, FNP  [facility] [place]

## 2024-05-26 ENCOUNTER — TRANSFERRED RECORDS (OUTPATIENT)
Dept: HEALTH INFORMATION MANAGEMENT | Facility: CLINIC | Age: 59
End: 2024-05-26
Payer: COMMERCIAL

## 2024-05-29 ENCOUNTER — MYC REFILL (OUTPATIENT)
Dept: RHEUMATOLOGY | Facility: CLINIC | Age: 59
End: 2024-05-29
Payer: COMMERCIAL

## 2024-05-29 DIAGNOSIS — M06.9 RHEUMATOID ARTHRITIS INVOLVING MULTIPLE SITES, UNSPECIFIED WHETHER RHEUMATOID FACTOR PRESENT (H): ICD-10-CM

## 2024-05-29 RX ORDER — METHOTREXATE 2.5 MG/1
20 TABLET ORAL
Qty: 96 TABLET | Refills: 1 | Status: CANCELLED | OUTPATIENT
Start: 2024-05-29

## 2024-05-30 ENCOUNTER — VIRTUAL VISIT (OUTPATIENT)
Dept: PSYCHIATRY | Facility: CLINIC | Age: 59
End: 2024-05-30
Attending: PSYCHIATRY & NEUROLOGY
Payer: COMMERCIAL

## 2024-05-30 VITALS — WEIGHT: 280 LBS | HEIGHT: 76 IN | BODY MASS INDEX: 34.1 KG/M2

## 2024-05-30 DIAGNOSIS — F41.1 GENERALIZED ANXIETY DISORDER WITH PANIC ATTACKS: ICD-10-CM

## 2024-05-30 DIAGNOSIS — F41.0 GENERALIZED ANXIETY DISORDER WITH PANIC ATTACKS: ICD-10-CM

## 2024-05-30 DIAGNOSIS — F43.10 PTSD (POST-TRAUMATIC STRESS DISORDER): ICD-10-CM

## 2024-05-30 DIAGNOSIS — F33.1 MAJOR DEPRESSIVE DISORDER, RECURRENT EPISODE, MODERATE (H): Primary | ICD-10-CM

## 2024-05-30 PROCEDURE — 99214 OFFICE O/P EST MOD 30 MIN: CPT | Mod: 95

## 2024-05-30 RX ORDER — FLUOXETINE 40 MG/1
80 CAPSULE ORAL DAILY
Qty: 180 CAPSULE | Refills: 0 | Status: SHIPPED | OUTPATIENT
Start: 2024-07-05 | End: 2024-07-14

## 2024-05-30 RX ORDER — QUETIAPINE FUMARATE 100 MG/1
100 TABLET, FILM COATED ORAL AT BEDTIME
Qty: 30 TABLET | Refills: 1 | Status: SHIPPED | OUTPATIENT
Start: 2024-06-24

## 2024-05-30 RX ORDER — LANOLIN ALCOHOL/MO/W.PET/CERES
3 CREAM (GRAM) TOPICAL EVERY EVENING
Qty: 90 TABLET | Refills: 0 | Status: SHIPPED | OUTPATIENT
Start: 2024-07-05 | End: 2024-06-15

## 2024-05-30 RX ORDER — PRAZOSIN HYDROCHLORIDE 2 MG/1
2 CAPSULE ORAL AT BEDTIME
Qty: 90 CAPSULE | Refills: 0 | Status: SHIPPED | OUTPATIENT
Start: 2024-07-05

## 2024-05-30 ASSESSMENT — PATIENT HEALTH QUESTIONNAIRE - PHQ9
SUM OF ALL RESPONSES TO PHQ QUESTIONS 1-9: 8
10. IF YOU CHECKED OFF ANY PROBLEMS, HOW DIFFICULT HAVE THESE PROBLEMS MADE IT FOR YOU TO DO YOUR WORK, TAKE CARE OF THINGS AT HOME, OR GET ALONG WITH OTHER PEOPLE: SOMEWHAT DIFFICULT
SUM OF ALL RESPONSES TO PHQ QUESTIONS 1-9: 8

## 2024-05-30 ASSESSMENT — PAIN SCALES - GENERAL: PAINLEVEL: EXTREME PAIN (8)

## 2024-05-30 NOTE — NURSING NOTE
Is the patient currently in the state of [place]? YES    Visit mode:VIDEO    If the visit is dropped, the patient can be reconnected by: VIDEO VISIT: Text to cell phone:   Telephone Information:   Mobile [phone]    Or VIDEO VISIT: Send to e-mail at: [email or web address]    Will anyone else be joining the visit? NO  (If patient encounters technical issues they should call [phone]    How would you like to obtain your AVS? MyChart    Are changes needed to the allergy or medication list? No    Are refills needed on medications prescribed by this physician? NO    Reason for visit: BERTO RAMOSF

## 2024-05-30 NOTE — TELEPHONE ENCOUNTER
Rx available        methotrexate 2.5 MG tablet 96 tablet 1 2/29/2024 -- No   Sig - Route: Take 8 tablets (20 mg) by mouth every 7 days - Oral

## 2024-05-30 NOTE — PATIENT INSTRUCTIONS
It was nice seeing you today     Treatment Plan Today:      1) Medications-No changes      2) Please schedule and attend psychotherapy sessions    3) Please return to clinic in 8 weeks with a new provider. Please call to schedule if you do not hear from our schedulers by mid June.      4) Please call [phone] to schedule your neuropsychological testing.     5) Crisis numbers are below and clinic after hours number is [phone]      **For crisis resources, please see the information at the end of this document**   Patient Education    Thank you for coming to the [facility] [place] [facility].     Lab Testing:  If you had lab testing today and your results are reassuring or normal they will be mailed to you or sent through [employer] within 7 days. If the lab tests need quick action we will call you with the results. The phone number we will call with results is # [phone]. If this is not the best number please call our clinic and change the number.     Medication Refills:  If you need any refills please call your pharmacy and they will contact us. Our fax number for refills is [phone].   Three business days of notice are needed for general medication refill requests.   Five business days of notice are needed for controlled substance refill requests.   If you need to change to a different pharmacy, please contact the new pharmacy directly. The new pharmacy will help you get your medications transferred.     Contact Us:  Please call [phone] during business hours (8-5:00 M-F).   If you have medication related questions after clinic hours, or on the weekend, please call [phone].     Financial Assistance [phone]   Medical Records [phone]       MENTAL HEALTH CRISIS RESOURCES:  For a emergency help, please call 911 or go to the nearest Emergency Department.     Emergency Walk-In Options:   EmPATH Unit @ [facility] ([place]): [phone] - Specialized  mental health emergency area designed to be calming  [facility] ([place]): [phone]  [facility] ([place]): [phone]  [facility] ([place]): [phone]    [facility] Crisis Information:   [place]: [phone]  [place]: [phone]  [place] ([place]) - Adult: [phone]     Child: [phone]  [place] - Adult: [phone]     Child: [phone]  [place]: [phone]  List of all [facility] resources:   https://[place].gov/dhs/people-we-serve/adults/health-care/mental-health/resources/crisis-contacts.jsp    National Crisis Information:   Crisis Text Line: Text  [place]  to 552518  Suicide & Crisis Lifeline: 988  National Suicide Prevention Lifeline: 1-027-791-TALK (1-182.217.4141)       For online chat options, visit https://suicidepreventionlifeline.org/chat/  Poison Control Center: 1-669.117.9053  Trans Lifeline: 1-848.218.5187 - Hotline for transgender people of all ages  The Tu Project: 2-636-926-6517 - Hotline for LGBT youth     For Non-Emergency Support:   Fast Tracker: Mental Health & Substance Use Disorder Resources -   https://www.AkredotrackIntrinsic-IDn.org/         55yo M with PMHx COPD, REEBCCA/BIPAP at night, active smoker, presents for SOB today. Chest tightness, wheezing. Given nebs, steroids with mild improvement. CXR clear. O2 sats mid 90s. Will admit as COPD exacerbation
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
## 2024-06-05 ENCOUNTER — MYC REFILL (OUTPATIENT)
Dept: FAMILY MEDICINE | Facility: CLINIC | Age: 59
End: 2024-06-05
Payer: COMMERCIAL

## 2024-06-05 DIAGNOSIS — E78.5 DYSLIPIDEMIA: ICD-10-CM

## 2024-06-06 RX ORDER — ATORVASTATIN CALCIUM 20 MG/1
20 TABLET, FILM COATED ORAL DAILY
Qty: 90 TABLET | Refills: 1 | Status: SHIPPED | OUTPATIENT
Start: 2024-06-06 | End: 2024-09-24

## 2024-06-10 ENCOUNTER — OFFICE VISIT (OUTPATIENT)
Dept: FAMILY MEDICINE | Facility: CLINIC | Age: 59
End: 2024-06-10
Payer: COMMERCIAL

## 2024-06-10 VITALS
WEIGHT: 283.2 LBS | TEMPERATURE: 97.7 F | BODY MASS INDEX: 34.49 KG/M2 | HEART RATE: 46 BPM | SYSTOLIC BLOOD PRESSURE: 130 MMHG | RESPIRATION RATE: 13 BRPM | OXYGEN SATURATION: 98 % | DIASTOLIC BLOOD PRESSURE: 77 MMHG | HEIGHT: 76 IN

## 2024-06-10 DIAGNOSIS — I10 ESSENTIAL HYPERTENSION WITH GOAL BLOOD PRESSURE LESS THAN 140/90: Primary | ICD-10-CM

## 2024-06-10 DIAGNOSIS — R21 RASH: ICD-10-CM

## 2024-06-10 DIAGNOSIS — R00.1 BRADYCARDIA: ICD-10-CM

## 2024-06-10 LAB
BASOPHILS # BLD AUTO: 0 10E3/UL (ref 0–0.2)
BASOPHILS NFR BLD AUTO: 0 %
EOSINOPHIL # BLD AUTO: 0.2 10E3/UL (ref 0–0.7)
EOSINOPHIL NFR BLD AUTO: 2 %
ERYTHROCYTE [DISTWIDTH] IN BLOOD BY AUTOMATED COUNT: 13.4 % (ref 10–15)
HCT VFR BLD AUTO: 43.3 % (ref 40–53)
HGB BLD-MCNC: 14.3 G/DL (ref 13.3–17.7)
IMM GRANULOCYTES # BLD: 0 10E3/UL
IMM GRANULOCYTES NFR BLD: 0 %
LYMPHOCYTES # BLD AUTO: 1.6 10E3/UL (ref 0.8–5.3)
LYMPHOCYTES NFR BLD AUTO: 20 %
MCH RBC QN AUTO: 30 PG (ref 26.5–33)
MCHC RBC AUTO-ENTMCNC: 33 G/DL (ref 31.5–36.5)
MCV RBC AUTO: 91 FL (ref 78–100)
MONOCYTES # BLD AUTO: 0.5 10E3/UL (ref 0–1.3)
MONOCYTES NFR BLD AUTO: 7 %
NEUTROPHILS # BLD AUTO: 5.6 10E3/UL (ref 1.6–8.3)
NEUTROPHILS NFR BLD AUTO: 70 %
PLATELET # BLD AUTO: 255 10E3/UL (ref 150–450)
RBC # BLD AUTO: 4.77 10E6/UL (ref 4.4–5.9)
WBC # BLD AUTO: 8 10E3/UL (ref 4–11)

## 2024-06-10 PROCEDURE — 99213 OFFICE O/P EST LOW 20 MIN: CPT

## 2024-06-10 PROCEDURE — 85025 COMPLETE CBC W/AUTO DIFF WBC: CPT

## 2024-06-10 PROCEDURE — 36415 COLL VENOUS BLD VENIPUNCTURE: CPT

## 2024-06-10 RX ORDER — LOSARTAN POTASSIUM 25 MG/1
25 TABLET ORAL DAILY
Qty: 45 TABLET | Refills: 0 | Status: SHIPPED | OUTPATIENT
Start: 2024-06-10 | End: 2024-07-14

## 2024-06-10 ASSESSMENT — COLUMBIA-SUICIDE SEVERITY RATING SCALE - C-SSRS
2. IN THE PAST MONTH, HAVE YOU ACTUALLY HAD ANY THOUGHTS OF KILLING YOURSELF?: NO
6. HAVE YOU EVER DONE ANYTHING, STARTED TO DO ANYTHING, OR PREPARED TO DO ANYTHING TO END YOUR LIFE?: NO
1. WITHIN THE PAST MONTH, HAVE YOU WISHED YOU WERE DEAD OR WISHED YOU COULD GO TO SLEEP AND NOT WAKE UP?: YES

## 2024-06-10 ASSESSMENT — PAIN SCALES - GENERAL: PAINLEVEL: SEVERE PAIN (7)

## 2024-06-10 NOTE — PROGRESS NOTES
"  Assessment & Plan     Essential hypertension with goal blood pressure less than 140/90  - Atenolol discontinued. Start losartan 25 mg daily. Follow up in one month to ensure BP stability and adjust dose as needed.   - losartan (COZAAR) 25 MG tablet  Dispense: 45 tablet; Refill: 0    Bradycardia  - Suspect due to beta blocker. This has been discontinued and replaced with losartan.    Rash  - Small, bright red elevated lesions consistent with cherry hemangiomas. Does not appear to be petechial. Will check CBC to rule out thrombocytopenia.  - CBC with platelets and differential    BMI  Estimated body mass index is 34.88 kg/m  as calculated from the following:    Height as of this encounter: 1.919 m (6' 3.55\").    Weight as of this encounter: 128.5 kg (283 lb 3.2 oz).     Depression Screening Follow Up        6/10/2024    12:38 PM   C-SSRS (Brief [place])   Within the last month, have you wished you were dead or wished you could go to sleep and not wake up? Yes   Within the last month, have you had any actual thoughts of killing yourself? No   Within the last month, have you ever done anything, started to do anything, or prepared to do anything to end your life? No      Follow Up Actions Taken  Crisis resource information provided in the After Visit Summary  Referred patient back to mental health provider    Discussed the following ways the patient can remain in a safe environment:  be around others    BP follow up in one month.     Subjective   Jamison is a 58 year old, presenting for the following health issues:  Derm Problem (Red dots spread across body)        6/10/2024    11:03 AM   Additional Questions   Roomed by Lashawn         6/10/2024    11:03 AM   Patient Reported Additional Medications   Patient reports taking the following new medications none     History of Present Illness       Reason for visit:  I have these red dots on my arms and body that just showed up one day    He eats 2-3 servings of fruits and " "vegetables daily.He consumes 1 sweetened beverage(s) daily.He exercises with enough effort to increase his heart rate 10 to 19 minutes per day.  He exercises with enough effort to increase his heart rate 4 days per week. He is missing 2 dose(s) of medications per week.     Patient's brother had ITP as a child that looks similar and patient just wants to ensure that ITP is not the cause of the red dots. Patient would like to discuss the possible causes of the dots. Dots first noticed approximately 3-4 weeks ago. No itching or other symptoms.    Bradycardia. Occasional positional dizziness. Suspect likely due to beta blocker use.     Review of Systems  Constitutional, HEENT, cardiovascular, pulmonary, gi and gu systems are negative, except as otherwise noted.      Objective    /77 (BP Location: Left arm, Patient Position: Sitting, Cuff Size: Adult Large)   Pulse (!) 46   Temp 97.7  F (36.5  C) (Oral)   Resp 13   Ht 1.919 m (6' 3.55\")   Wt 128.5 kg (283 lb 3.2 oz)   SpO2 98%   BMI 34.88 kg/m    Body mass index is 34.88 kg/m .    Physical Exam   GENERAL: alert and no distress  NECK: no adenopathy, no asymmetry, masses, or scars  RESP: lungs clear to auscultation - no rales, rhonchi or wheezes  CV: regular rate and rhythm, normal S1 S2, no S3 or S4, no murmur, click or rub, no peripheral edema  ABDOMEN: soft, nontender, no hepatosplenomegaly, no masses and bowel sounds normal  MS: no gross musculoskeletal defects noted, no edema  SKIN: multiple cherry hemangiomas on trunk and bilateral upper extremities.       Signed Electronically by: JUANITA Curran CNP    "

## 2024-06-10 NOTE — PATIENT INSTRUCTIONS
Psychiatry number: Call [phone] to schedule.     Stop taking atenolol and start losartan 25 mg daily    Follow up appointment in 1 month to review BP and redraw labs

## 2024-06-10 NOTE — LETTER
My Depression Action Plan  Name: Jailene Breen   Date of Birth 1965  Date: 6/10/2024    My doctor: Minda Monzon   My clinic: [address] [id number] [phone]            GREEN    ZONE   Good Control    What it looks like:   Things are going generally well. You have normal ups and downs. You may even feel depressed from time to time, but bad moods usually last less than a day.   What you need to do:  Continue to care for yourself (see self care plan)  Check your depression survival kit and update it as needed  Follow your physician s recommendations including any medication.  Do not stop taking medication unless you consult with your physician first.             YELLOW         ZONE Getting Worse    What it looks like:   Depression is starting to interfere with your life.   It may be hard to get out of bed; you may be starting to isolate yourself from others.  Symptoms of depression are starting to last most all day and this has happened for several days.   You may have suicidal thoughts but they are not constant.   What you need to do:     Call your care team. Your response to treatment will improve if you keep your care team informed of your progress. Yellow periods are signs an adjustment may need to be made.     Continue your self-care.  Just get dressed and ready for the day.  Don't give yourself time to talk yourself out of it.    Talk to someone in your support network.    Open up your Depression Self-Care Plan/Wellness Kit.             RED    ZONE Medical Alert - Get Help    What it looks like:   Depression is seriously interfering with your life.   You may experience these or other symptoms: You can t get out of bed most days, can t work or engage in other necessary activities, you have trouble taking care of basic hygiene, or basic responsibilities, thoughts of suicide or death that will not go away,  self-injurious behavior.     What you need to do:  Call your care team and request a same-day appointment. If they are not available (weekends or after hours) call your local crisis line, emergency room or 911.          Depression Self-Care Plan / Wellness Kit    Many people find that medication and therapy are helpful treatments for managing depression. In addition, making small changes to your everyday life can help to boost your mood and improve your wellbeing. Below are some tips for you to consider. Be sure to talk with your medical provider and/or behavioral health consultant if your symptoms are worsening or not improving.     Sleep   Sleep hygiene  means all of the habits that support good, restful sleep. It includes maintaining a consistent bedtime and wake time, using your bedroom only for sleeping or sex, and keeping the bedroom dark and free of distractions like a computer, smartphone, or television.     Develop a Healthy Routine  Maintain good hygiene. Get out of bed in the morning, make your bed, brush your teeth, take a shower, and get dressed. Don t spend too much time viewing media that makes you feel stressed. Find time to relax each day.    Exercise  Get some form of exercise every day. This will help reduce pain and release endorphins, the  feel good  chemicals in your brain. It can be as simple as just going for a walk or doing some gardening, anything that will get you moving.      Diet  Strive to eat healthy foods, including fruits and vegetables. Drink plenty of water. Avoid excessive sugar, caffeine, alcohol, and other mood-altering substances.     Stay Connected with Others  Stay in touch with friends and family members.    Manage Your Mood  Try deep breathing, massage therapy, biofeedback, or meditation. Take part in fun activities when you can. Try to find something to smile about each day.     Psychotherapy  Be open to working with a therapist if your provider recommends it.      Medication  Be sure to take your medication as prescribed. Most anti-depressants need to be taken every day. It usually takes several weeks for medications to work. Not all medicines work for all people. It is important to follow-up with your provider to make sure you have a treatment plan that is working for you. Do not stop your medication abruptly without first discussing it with your provider.    Crisis Resources   These hotlines are for both adults and children. They and are open 24 hours a day, 7 days a week unless noted otherwise.    National Suicide Prevention Lifeline   988 or 9-661-011-WAPN (8666)    Crisis Text Line    www.crisistextline.org  Text HOME to 891053 from anywhere in the [place], anytime, about any type of crisis. A live, trained crisis counselor will receive the text and respond quickly.    [facility] Lifeline for LGBTQ Youth  A national crisis intervention and suicide lifeline for LGBTQ youth under 25. Provides a safe place to talk without judgement. Call 1-441.342.1675; text START to 787930 or visit www.thetrevorproject.org to talk to a trained counselor.    For [facility] crisis numbers, visit the [facility] website at:  https://[place].gov/dhs/people-we-serve/adults/health-care/mental-health/resources/crisis-contacts.jsp

## 2024-06-11 NOTE — ED AVS SNAPSHOT
[facility] Emergency Department    [address]    [facility] [address]    Phone:  [phone]    Fax:  [phone]                                       Jailene Breen   MRN: [id number]    Department:  [facility] Emergency Department   Date of Visit:  2/21/2017           Patient Information     Date Of Birth          1965        Your diagnoses for this visit were:     Syncope, unspecified syncope type     Chronic right-sided low back pain without sciatica     Contusion of right hand, initial encounter     Contusion of right knee, initial encounter        You were seen by Max Antunez DO.      Follow-up Information     Follow up with Aiden Resendez PA. Call in 2 days.    Specialty:  Physician Assistant    Why:  As needed    Contact information:    [facility]  [address] [facility] [address]  [phone]          Follow up with [facility] Emergency Department.    Specialty:  EMERGENCY MEDICINE    Why:  If symptoms worsen    Contact information:    [address]  [facility] [id number] [phone]        Discharge Instructions           Neck/Back Pain: General    Both neck and back pain are usually caused by injury to the muscles or ligaments of the spine. Sometimes the disks that separate each bone of the spine may cause pain by pressing on a nearby nerve. Back and neck pain may appear after a sudden twisting/bending force (such as in a car accident), or sometimes after a simple awkward movement. In either case, muscle spasm is often present and adds to the pain.  Acute neck and back pain usually gets better in 1 to 2 weeks. Pain related to disk disease, arthritis in the spinal joints or spinal stenosis (narrowing of the spinal canal) can become chronic and last for months or years.  Back and neck pain are common problems. Most people feel better in 1 or 2 weeks, and most of the rest in 1 to 2  Medication: Lasix  Last office visit date: 02/14/24  Medication Refill Protocol Failed.      1st attempt:  In an effort to ensure that our patients [employer], a Team Member has reviewed your chart and identified an opportunity to provide the best care possible. An attempt was made to discuss or schedule due or overdue Preventive or Chronic Condition care.    The Outcome was Contact was made, care gap was discussed - see further documentation. Care Gaps identified: RETURN IN ABOUT 3 MONTHS (AROUND 5/14/2024) FOR 3 MONTH F/U     Appointment needed:  spoke with patient's daughter (whom we have permission to speak to ) she will [facility] to schedule patient 3 month f/u.      "months. Most people can remain active.  Symptoms  People experience and describe pain differently.    Pain can be sharp, stabbing, shooting, aching, cramping, or burning    Movement, standing, bending, lifting, sitting, or walking may worsen the pain    Pain can be localized to one spot or area, or it can be more generalized    Pain can spread or radiate upwards, downwards, to the front, or go down your arms    Muscle spasm may occur.  Cause  Most of the time \"mechanical problems\" with the muscles or spine cause the pain. it is usually caused by an injury, whether known or not, to the muscles or ligaments. While illnesses can cause back pain, it is usually not caused by a serious illness. Pain is usually related to physical activity, whether sports, exercise, work, or normal activity. Sometimes it can occur without an identifiable cause. This can happen simply by stretching or moving wrong, without noting pain at the time. Other causes include:    Overexertion, lifting, pushing, pulling incorrectly or too aggressively.    Sudden twisting, bending or stretching from an accident (car or fall), or accidental movement.    Poor posture    Poor conditioning, lack of regular exercise    Spinal disc disease or arthritis    Stress    Pregnancy, or illness like appendicitis, bladder or kidney infection, pelvic infections   Home care    For neck pain: Use a comfortable pillow that supports the head and keeps the spine in a neutral position. The position of the head should not be tilted forward or backward.    When in bed, try to find a position of comfort. A firm mattress is best. Try lying flat on your back with pillows under your knees. You can also try lying on your side with your knees bent up towards your chest and a pillow between your knees.    At first, do not try to stretch out the sore spots. If there is a strain, it is not like the good soreness you get after exercising without an injury. In this case, stretching " may make it worse.    Avoid prolonged sitting, long car rides or travel. This puts more stress on the lower back than standing or walking.    During the first 24 to 72 hours after an injury, apply an ice pack to the painful area for 20 minutes and then remove it for 20 minutes over a period of 60 to 90 minutes or several times a day. As a safety precaution, do not use a heating pad at bedtime. Sleeping with a heating pad can lead to skin burns or tissue damage.    Ice and heat therapies can be alternated. Talk with your health care provider about the best treatment for your back or neck pain.    Therapeutic massage can help relax the back and neck muscles without stretching them.    Be aware of safe lifting methods and do not lift anything over 15 pounds until all the pain is gone.  Medications  Talk to your health care provider before using medications, especially if you have other medical problems or are taking other medicines.    You may use acetaminophen (such as Tylenol) or ibuprofen (such as Advil or Motrin) to control pain, unless another pain medicine was prescribed. If you have chronic conditions like diabetes, liver or kidney disease, stomach ulcers,  gastrointestinal bleeding, or are taking blood thinner medications.    Be careful if you are given pain medicines, narcotics, or medication for muscle spasm. They can cause drowsiness, and can affect your coordination, reflexes, and judgment. Do not drive or operate heavy machinery.  Follow-up care  Follow up with your health care provider if your symptoms do not start to improve after one week. Physical therapy or further tests may be needed.  If X-rays were taken, they will be reviewed by a radiologist. You will be notified of any new findings that may affect your care.  Call 911  Seek emergency medical care if any of the following occur:    Trouble breathing    Confusion    Very drowsy or trouble awakening    Fainting or loss of consciousness    Rapid or  very slow heart rate    Loss of bowel or bladder control  When to seek medical care  Get prompt medical attention if any of the following occur:    Pain becomes worse or spreads into your arms or legs    Weakness, numbness or pain in one or both arms or legs    Numbness in the groin area    Difficulty walking    Fever of 100.4 F (38 C) or higher, or as directed by your healthcare provider    7474-8234 [employer]. [address] [place] [address], [place] [address]. All rights reserved. This information is not intended as a substitute for professional medical care. Always follow your healthcare professional's instructions.      Fainting: Uncertain Cause  Fainting (syncope) is a temporary loss of consciousness. It s also called passing out. It occurs when blood flow to the brain is less than normal. Near-fainting (near-syncope) is very similar to fainting, but you don t fully pass out.  Common minor causes of fainting include:    Sudden fear    Pain    Nausea    Emotional stress    Overexertion  Suddenly standing up after sitting or lying for a long time can also cause fainting.  More serious causes of fainting include:    Very slow or very fast heartbeat (arrhythmia)    Other types of heart disease    Dehydration    Loss of blood loss    Seizure    Stroke    Ruptured blood vessel in the brain  Taking too much high blood pressure medicine can also cause low blood pressure and fainting.  Your health care provider does not know the exact cause of your fainting. But the tests today did not show any of the serious causes of fainting. Sometimes you may need more tests to find out if you have a serious problem. That s why it s important to follow up with your provider as advised.  Home care  Follow these guidelines when caring for yourself at home:    Rest today. You may go back to your normal activities when you are feeling back to normal. It is best to stay with someone who can check on you for the next 24 hours to  watch for another episode of fainting.    If you become lightheaded or dizzy, lie down right away. Or sit with your head between your knees.    Because the provider doesn t know the exact cause of your fainting or near-fainting spell, it s possible for you to have another spell without warning. Because of this, don t drive a car or operate dangerous equipment. Don t take a bath alone. Use a shower instead. Don t swim alone until your health care provider says that you are no longer in danger of having another fainting spell.  Follow-up care  Follow up with your health care provider, or as advised.  When to seek medical advice  Call your health care provider right away if any of these occur:    Another fainting spell that s not explained by the common causes listed above    Pain in your chest, arm, neck, jaw, back, or abdomen    Shortness of breath    Severe headache or seizure    Blood in vomit or stools (black or red color)    Unexpected vaginal bleeding    Your heart beats very rapidly, very slowly, or irregularly (palpitations)  Also call your provider if you have signs of stroke:    Weakness in an arm or leg or on one side of the face    Difficulty speaking or seeing    Extreme drowsiness, confusion, dizziness, or fainting    0836-3705 [employer]. [address] [place] [address], [place] [address]. All rights reserved. This information is not intended as a substitute for professional medical care. Always follow your healthcare professional's instructions.        Bruises (Contusions)    A contusion is a bruise. A bruise happens when a blow to your body doesn't break the skin but does break blood vessels beneath the skin. Blood leaking from the broken vessels causes redness and swelling. As it heals, your bruise is likely to turn colors like purple, green, and yellow. This is normal. The bruise should fade in 2 or 3 weeks.  Factors that make you more likely to bruise  Almost everyone bruises now and then.  Certain people do bruise more easily than others. You're more prone to bruising as you get older. That's because blood vessels become more fragile with age. You're also more likely to bruise if you have a clotting disorder such as hemophilia or take medications that reduce clotting, including aspirin.  When to go to the emergency room (ER)  Bruises almost always heal on their own without special treatment. But for some people, a bad bruise can be serious. Seek medical care if you:    Have a clotting disorder such as hemophilia.    Have cirrhosis or other serious liver disease.    Take blood-thinning medications such as warfarin (Coumadin).  What to expect in the ER  A doctor will examine your bruise and ask about any health conditions you have. In some cases, you may have a test to check how well your blood clots. Other treatment will depend on your needs.  Follow-up care  Sometimes a bruise gets worse instead of better. It may become larger and more swollen. This can occur when your body walls off a small pool of blood under the skin (hematoma). In very rare cases, your doctor may need to drain excess blood from the area.  Tip:  Apply an ice pack or bag of frozen peas to a bruise (keep a thin cloth between the cold source and your skin). This can help reduce redness and swelling.     3113-7696 [employer]. [address] [place] [address], [place] [address]. All rights reserved. This information is not intended as a substitute for professional medical care. Always follow your healthcare professional's instructions.            Future Appointments        Provider Department Dept Phone Center    3/31/2017 9:00 AM JUANITA Ga [facility] [place] [phone] ROZ HERNANDEZ      24 Hour Appointment Hotline       To make an appointment at any [facility], call [phone] ([phone]). If you don't have a family doctor or clinic, we will help you find one. [facility] are  conveniently located to serve the needs of you and your family.             Review of your medicines      Our records show that you are taking the medicines listed below. If these are incorrect, please call your family doctor or clinic.        Dose / Directions Last dose taken    clonazePAM 1 MG tablet   Commonly known as:  klonoPIN   Dose:  0.5-1 mg   Quantity:  4 tablet        Take 0.5-1 tablets (0.5-1 mg) by mouth 2 times daily as needed for anxiety   Refills:  0        cyclobenzaprine 5 MG tablet   Commonly known as:  FLEXERIL   Dose:  5 mg        Take 5 mg by mouth   Refills:  0        DULoxetine 30 MG EC capsule   Commonly known as:  CYMBALTA   Quantity:  60 capsule        1 tab daily for 1 week then 1 tab BID   Refills:  3        FLUoxetine 20 MG capsule   Commonly known as:  PROzac   Dose:  60 mg   Quantity:  90 capsule        Take 3 capsules (60 mg) by mouth daily   Refills:  3        folic acid 1 MG tablet   Commonly known as:  FOLVITE        Refills:  0        HUMIRA 40 MG/0.8ML prefilled syringe kit   Dose:  40 mg   Generic drug:  adalimumab        Inject 40 mg Subcutaneous   Refills:  0        hydrOXYzine 25 MG tablet   Commonly known as:  ATARAX   Dose:  25-50 mg   Quantity:  90 tablet        Take 1-2 tablets (25-50 mg) by mouth every 6 hours as needed for anxiety   Refills:  3        IBUPROFEN PO   Dose:  800 mg        Take 800 mg by mouth every 6 hours as needed for moderate pain   Refills:  0        methotrexate 2.5 MG tablet CHEMO   Dose:  6 tablet        6 tablets 6- tablets every Sunday   Refills:  0        methylPREDNISolone 4 MG tablet   Commonly known as:  MEDROL DOSEPAK   Quantity:  21 tablet        Follow package instructions   Refills:  0        * oxyCODONE 5 MG capsule   Commonly known as:  OXY-IR   Dose:  5 mg        Take 5 mg by mouth every 6 hours as needed for moderate to severe pain   Refills:  0        * oxyCODONE 10 MG 12 hr tablet   Commonly known as:  OxyCONTIN   Quantity:  28  tablet        1 tab q 6 hours daily for 1 week   Refills:  0        * oxyCODONE 10 MG 12 hr tablet   Commonly known as:  OxyCONTIN   Quantity:  21 tablet        1 tab 3 times daily for one week.   Refills:  0        * oxyCODONE 10 MG 12 hr tablet   Commonly known as:  OxyCONTIN   Dose:  10 mg   Quantity:  7 tablet        Take 1 tablet (10 mg) by mouth daily   Refills:  0        * oxyCODONE 10 MG IR tablet   Commonly known as:  ROXICODONE   Dose:  10 mg   Quantity:  28 tablet        Take 1 tablet (10 mg) by mouth every 6 hours as needed for moderate to severe pain   Refills:  0        * oxyCODONE 10 MG IR tablet   Commonly known as:  ROXICODONE   Dose:  10 mg   Quantity:  21 tablet        Take 1 tablet (10 mg) by mouth every 8 hours as needed for moderate to severe pain   Refills:  0        * oxyCODONE 10 MG 12 hr tablet   Commonly known as:  OxyCONTIN   Dose:  10 mg   Quantity:  14 tablet   Start taking on:  2/24/2017        Take 1 tablet (10 mg) by mouth every 12 hours as needed for moderate to severe pain maximum 2 tablet(s) per day   Refills:  0        tamsulosin 0.4 MG capsule   Commonly known as:  FLOMAX   Dose:  0.4 mg   Quantity:  30 capsule        Take 1 capsule (0.4 mg) by mouth daily   Refills:  3        * Notice:  This list has 7 medication(s) that are the same as other medications prescribed for you. Read the directions carefully, and ask your doctor or other care provider to review them with you.            Procedures and tests performed during your visit     Basic metabolic panel    CBC with platelets differential    CT Head w/o Contrast    Cervical spine CT w/o contrast    EKG 12 lead    Hand XR, G/E 3 views, right    Lumbar spine XR, 2-3 views    Peripheral IV catheter    Troponin I    XR Chest 1 View      Orders Needing Specimen Collection     None      Pending Results     Date and Time Order Name Status Description    2/21/2017 0832 EKG 12 lead Preliminary     2/21/2017 0830 Cervical spine CT w/o  contrast Preliminary     2/21/2017 0830 CT Head w/o Contrast Preliminary             Pending Culture Results     No orders found from 2/19/2017 to 2/22/2017.             Test Results from your hospital stay     2/21/2017  8:59 AM - Interface, Flexilab Results      Component Results     Component Value Ref Range & Units Status    WBC 9.8 4.0 - 11.0 10e9/L Final    RBC Count 5.17 4.4 - 5.9 10e12/L Final    Hemoglobin 15.8 13.3 - 17.7 g/dL Final    Hematocrit 45.3 40.0 - 53.0 % Final    MCV 88 78 - 100 fl Final    MCH 30.6 26.5 - 33.0 pg Final    MCHC 34.9 31.5 - 36.5 g/dL Final    RDW 12.7 10.0 - 15.0 % Final    Platelet Count 254 150 - 450 10e9/L Final    Diff Method Automated Method  Final    % Neutrophils 76.8 % Final    % Lymphocytes 15.4 % Final    % Monocytes 5.8 % Final    % Eosinophils 1.2 % Final    % Basophils 0.5 % Final    % Immature Granulocytes 0.3 % Final    Nucleated RBCs 0 0 /100 Final    Absolute Neutrophil 7.5 1.6 - 8.3 10e9/L Final    Absolute Lymphocytes 1.5 0.8 - 5.3 10e9/L Final    Absolute Monocytes 0.6 0.0 - 1.3 10e9/L Final    Absolute Eosinophils 0.1 0.0 - 0.7 10e9/L Final    Absolute Basophils 0.1 0.0 - 0.2 10e9/L Final    Abs Immature Granulocytes 0.0 0 - 0.4 10e9/L Final    Absolute Nucleated RBC 0.0  Final         2/21/2017  9:24 AM - Interface, Flexilab Results      Component Results     Component Value Ref Range & Units Status    Sodium 142 133 - 144 mmol/L Final    Potassium 3.8 3.4 - 5.3 mmol/L Final    Chloride 106 94 - 109 mmol/L Final    Carbon Dioxide 24 20 - 32 mmol/L Final    Anion Gap 12 3 - 14 mmol/L Final    Glucose 114 (H) 70 - 99 mg/dL Final    Urea Nitrogen 11 7 - 30 mg/dL Final    Creatinine 0.92 0.66 - 1.25 mg/dL Final    GFR Estimate 87 >60 mL/min/1.7m2 Final    Non  GFR Calc    GFR Estimate If Black >90   GFR Calc   >60 mL/min/1.7m2 Final    Calcium 8.8 8.5 - 10.1 mg/dL Final         2/21/2017  9:24 AM - Interface, Flexilab Results       Component Results     Component Value Ref Range & Units Status    Troponin I ES  0.000 - 0.045 ug/L Final    <0.015  The 99th percentile for upper reference range is 0.045 ug/L.  Troponin values in   the range of 0.045 - 0.120 ug/L may be associated with risks of adverse   clinical events.                 2/21/2017 10:02 AM - Interface, Radiant Ib      Narrative     CT OF THE HEAD WITHOUT CONTRAST  2/21/2017 9:40 AM     COMPARISON: None.    HISTORY:  Syncope, fall.    TECHNIQUE: Axial CT images of the head from the skull base to the  vertex were acquired without IV contrast.    FINDINGS:  The ventricles and basal cisterns are within normal limits  in configuration. There is no midline shift. There are no extra-axial  fluid collections.  Gray-white differentiation is well maintained.     No intracranial hemorrhage, mass or recent infarct.    The visualized paranasal sinuses are well aerated. There is no  mastoiditis. There are no fractures of the visualized bones.         Impression     IMPRESSION:  Normal head CT.     Radiation dose for this scan was reduced using automated exposure  control, adjustment of the mA and/or kV according to patient size, or  iterative reconstruction technique.         2/21/2017 10:01 AM - Interface, Radiant Ib      Narrative     CT OF THE CERVICAL SPINE WITHOUT CONTRAST  2/21/2017 9:39 AM     COMPARISON: None.    HISTORY: Fall, midline neck pain around C5.     TECHNIQUE: Axial images of the cervical spine were acquired without  intravenous contrast. Multiplanar reformations were created.      FINDINGS: There is normal alignment of the cervical vertebrae.  Vertebral body heights of the cervical spine are normal.  Craniocervical alignment is normal. There are no fractures of the  cervical spine.  There is no prevertebral soft tissue swelling. There  is no spinal canal stenosis of the cervical spine.        Impression     IMPRESSION: No evidence for fracture or traumatic malalignment of  the  cervical spine.    Radiation dose for this scan was reduced using automated exposure  control, adjustment of the mA and/or kV according to patient size, or  iterative reconstruction technique.         2/21/2017  9:49 AM - Interface, Radiant Ib      Narrative     XR HAND RT G/E 3 VW 2/21/2017 9:45 AM    COMPARISON: 1/15/2014    HISTORY: Syncope, fall, pain at the right fifth metacarpal.        Impression     IMPRESSION: No fractures are seen in the right hand. Joints are  preserved and in normal alignment.    ANDRÉS ABRAHAM         2/21/2017  9:48 AM - Interface, Radiant Ib      Narrative     XR CHEST 1 VW 2/21/2017 9:45 AM    COMPARISON: 10/7/2016    HISTORY: 62        Impression     IMPRESSION: Cardiac silhouette and pulmonary vasculature are within  normal limits. No focal airspace disease, pleural effusion or  pneumothorax.    ANDRÉS ABRAHAM         2/21/2017 11:48 AM - Interface, Radiant Ib      Narrative     XR LUMBAR SPINE 2-3 VIEWS 2/21/2017 11:04 AM    COMPARISON: 5/13/2016    HISTORY: Fall        Impression     IMPRESSION: Vertebral heights are preserved without evidence of  fracture. Mild disc space loss at L5-S1 unchanged. Mild facet  degenerative changes at the lumbosacral junction.    ANDRÉS ABRAHAM                Clinical Quality Measure: Blood Pressure Screening     Your blood pressure was checked while you were in the emergency department today. The last reading we obtained was  BP: 137/79 . Please read the guidelines below about what these numbers mean and what you should do about them.  If your systolic blood pressure (the top number) is less than 120 and your diastolic blood pressure (the bottom number) is less than 80, then your blood pressure is normal. There is nothing more that you need to do about it.  If your systolic blood pressure (the top number) is 120-139 or your diastolic blood pressure (the bottom number) is 80-89, your blood pressure may be higher than it should be. You should have  your blood pressure rechecked within a year by a primary care provider.  If your systolic blood pressure (the top number) is 140 or greater or your diastolic blood pressure (the bottom number) is 90 or greater, you may have high blood pressure. High blood pressure is treatable, but if left untreated over time it can put you at risk for heart attack, stroke, or kidney failure. You should have your blood pressure rechecked by a primary care provider within the next 4 weeks.  If your provider in the emergency department today gave you specific instructions to follow-up with your doctor or provider even sooner than that, you should follow that instruction and not wait for up to 4 weeks for your follow-up visit.        Thank you for choosing [facility]       Thank you for choosing [facility] for your care. Our goal is always to provide you with excellent care. Hearing back from our patients is one way we can continue to improve our services. Please take a few minutes to complete the written survey that you may receive in the mail after you visit with us. Thank you!        [employer] Information     [employer] gives you secure access to your electronic health record. If you see a primary care provider, you can also send messages to your care team and make appointments. If you have questions, please call your primary care clinic.  If you do not have a primary care provider, please call [phone] and they will assist you.        Care EveryWhere ID     This is your Care EveryWhere ID. This could be used by other organizations to access your [facility] medical records  [id number]        After Visit Summary       This is your record. Keep this with you and show to your community pharmacist(s) and doctor(s) at your next visit.

## 2024-06-19 RX ORDER — LIOTHYRONINE SODIUM 25 UG/1
25 TABLET ORAL DAILY
Qty: 30 TABLET | Refills: 0 | OUTPATIENT
Start: 2024-06-19

## 2024-06-19 RX ORDER — PRAZOSIN HYDROCHLORIDE 1 MG/1
1 CAPSULE ORAL AT BEDTIME
Qty: 30 CAPSULE | Refills: 0 | OUTPATIENT
Start: 2024-06-19

## 2024-07-03 DIAGNOSIS — K59.03 DRUG-INDUCED CONSTIPATION: ICD-10-CM

## 2024-07-03 RX ORDER — DOCUSATE SODIUM 100 MG/1
100 CAPSULE, LIQUID FILLED ORAL 2 TIMES DAILY
Qty: 60 CAPSULE | Refills: 10 | Status: SHIPPED | OUTPATIENT
Start: 2024-07-03 | End: 2024-08-02

## 2024-07-04 ENCOUNTER — MYC MEDICAL ADVICE (OUTPATIENT)
Dept: PALLIATIVE MEDICINE | Facility: CLINIC | Age: 59
End: 2024-07-04
Payer: COMMERCIAL

## 2024-07-04 DIAGNOSIS — M47.817 SPONDYLOSIS WITHOUT MYELOPATHY OR RADICULOPATHY, LUMBOSACRAL REGION: ICD-10-CM

## 2024-07-04 DIAGNOSIS — M45.7 ANKYLOSING SPONDYLITIS OF LUMBOSACRAL REGION (H): ICD-10-CM

## 2024-07-04 DIAGNOSIS — M54.59 LUMBAR FACET JOINT PAIN: ICD-10-CM

## 2024-07-04 DIAGNOSIS — M25.50 MULTIPLE JOINT PAIN: ICD-10-CM

## 2024-07-04 DIAGNOSIS — M05.79 RHEUMATOID ARTHRITIS INVOLVING MULTIPLE SITES WITH POSITIVE RHEUMATOID FACTOR (H): ICD-10-CM

## 2024-07-04 DIAGNOSIS — F11.90 CHRONIC, CONTINUOUS USE OF OPIOIDS: ICD-10-CM

## 2024-07-04 DIAGNOSIS — F11.20 UNCOMPLICATED OPIOID DEPENDENCE (H): ICD-10-CM

## 2024-07-04 DIAGNOSIS — M51.369 DDD (DEGENERATIVE DISC DISEASE), LUMBAR: ICD-10-CM

## 2024-07-05 RX ORDER — BUPRENORPHINE 8 MG/1
8 TABLET SUBLINGUAL 3 TIMES DAILY
Qty: 90 TABLET | Refills: 0 | Status: SHIPPED | OUTPATIENT
Start: 2024-07-05 | End: 2024-08-08

## 2024-07-05 NOTE — TELEPHONE ENCOUNTER
Per patient myChart message:  Jamison MONTERO Pain Nurse (supporting Susana Pike, APRN CNP)13 hours ago (8:30 PM)   Hello,  how are you doing? Could you please refill my buprenorphine and send to [facility] pharmacy in [place].  Thank you.

## 2024-07-05 NOTE — TELEPHONE ENCOUNTER
Medication refill information reviewed.     subutex last due:  Fill on 5/28 and start 5/30/2024. 30 day script.,   Per [place] this was last filled on 6/6/24 #90 tabs     Due date:  7/6/24      Prescriptions prepped for review.     Cha RN-BSN  [facility]

## 2024-07-05 NOTE — TELEPHONE ENCOUNTER
Signed Prescriptions:                        Disp   Refills    buprenorphine (SUBUTEX) 8 MG SUBL sublingu*90 tab*0        Sig: Place 1 tablet (8 mg) under the tongue 3 times daily           Fill on fill now. and start 7/6/2024.  30 day           script.  Authorizing Provider: SUSANA PETTY        Reviewed [place]  July 5, 2024- no concerning fills.    Susana GRANT, RN CNP, FNP  [facility]  [facility] [place]

## 2024-07-05 NOTE — TELEPHONE ENCOUNTER
Received request for a refill(s) of     buprenorphine (SUBUTEX) 8 MG SUBL sublingual tablet        Last dispensed from pharmacy on   7/6/24 per [place]. MF    Patient's last office/virtual visit by prescribing provider on 05/20/24  Next office/virtual appointment scheduled for 07/10/24    Last urine drug screen date 05/20/24  Current opioid agreement on file (completed within the last year) Yes Date of opioid agreement: 05/20/24    E-prescribe to pharmacy-[id number] - [place], [place] - [address] AT [address]     Will route to nursing [place] for review and preparation of prescription(s).

## 2024-07-14 ENCOUNTER — MYC REFILL (OUTPATIENT)
Dept: PSYCHIATRY | Facility: CLINIC | Age: 59
End: 2024-07-14
Payer: COMMERCIAL

## 2024-07-14 ENCOUNTER — MYC REFILL (OUTPATIENT)
Dept: FAMILY MEDICINE | Facility: CLINIC | Age: 59
End: 2024-07-14
Payer: COMMERCIAL

## 2024-07-14 DIAGNOSIS — F41.0 GENERALIZED ANXIETY DISORDER WITH PANIC ATTACKS: ICD-10-CM

## 2024-07-14 DIAGNOSIS — I10 ESSENTIAL HYPERTENSION WITH GOAL BLOOD PRESSURE LESS THAN 140/90: ICD-10-CM

## 2024-07-14 DIAGNOSIS — F41.1 GENERALIZED ANXIETY DISORDER WITH PANIC ATTACKS: ICD-10-CM

## 2024-07-14 DIAGNOSIS — F33.1 MAJOR DEPRESSIVE DISORDER, RECURRENT EPISODE, MODERATE (H): ICD-10-CM

## 2024-07-15 RX ORDER — FLUOXETINE 40 MG/1
80 CAPSULE ORAL DAILY
Qty: 180 CAPSULE | Refills: 0 | Status: SHIPPED | OUTPATIENT
Start: 2024-07-15

## 2024-07-15 RX ORDER — LOSARTAN POTASSIUM 25 MG/1
25 TABLET ORAL DAILY
Qty: 90 TABLET | Refills: 0 | Status: SHIPPED | OUTPATIENT
Start: 2024-07-15 | End: 2024-08-02

## 2024-07-15 NOTE — TELEPHONE ENCOUNTER
Last seen: 05/30/2024  RTC: 8 weeks  Cancel: 0  No-show: 0  Next appt: None     Incoming refill from Patient via [employer]    Medication requested:   Pending Prescriptions:                       Disp   Refills    FLUoxetine (PROZAC) 40 MG capsule         180 ca*0            Sig: Take 2 capsules (80 mg) by mouth daily      From chart note:   - Continue fluoxetine (PROZAC) 80 mg daily     Medication unable to be refilled by RN due to criteria not met as indicated.                 []Eligibility - not seen in the last year              [x]Supervision - no future appointment              []Compliance - no shows, cancellations or lapse in therapy              []Verification - order discrepancy              []Controlled medication              []Medication not included in policy              []90-day supply request              []Other:

## 2024-07-23 NOTE — ADDENDUM NOTE
Encounter addended by: Josefina Beauchamp PsyD on: 5/17/2021 3:23 PM   Actions taken: Clinical Note Signed
Encounter addended by: Saul Quesada MD on: 5/18/2021 8:47 AM   Actions taken: Clinical Note Signed
negative...

## 2024-07-25 ENCOUNTER — MYC MEDICAL ADVICE (OUTPATIENT)
Dept: PALLIATIVE MEDICINE | Facility: CLINIC | Age: 59
End: 2024-07-25
Payer: COMMERCIAL

## 2024-07-25 DIAGNOSIS — M45.7 ANKYLOSING SPONDYLITIS OF LUMBOSACRAL REGION (H): ICD-10-CM

## 2024-07-25 DIAGNOSIS — M54.59 LUMBAR FACET JOINT PAIN: ICD-10-CM

## 2024-07-25 DIAGNOSIS — M25.50 MULTIPLE JOINT PAIN: ICD-10-CM

## 2024-07-25 DIAGNOSIS — M05.79 RHEUMATOID ARTHRITIS INVOLVING MULTIPLE SITES WITH POSITIVE RHEUMATOID FACTOR (H): ICD-10-CM

## 2024-07-25 DIAGNOSIS — M51.369 DDD (DEGENERATIVE DISC DISEASE), LUMBAR: ICD-10-CM

## 2024-07-25 DIAGNOSIS — F11.90 CHRONIC, CONTINUOUS USE OF OPIOIDS: ICD-10-CM

## 2024-07-26 RX ORDER — OXYCODONE HYDROCHLORIDE 5 MG/1
5 TABLET ORAL EVERY 6 HOURS PRN
Qty: 42 TABLET | Refills: 0 | Status: SHIPPED | OUTPATIENT
Start: 2024-07-26

## 2024-07-26 NOTE — TELEPHONE ENCOUNTER
Per patient myChart message:  Jamison MONTERO Pain Nurse (supporting Susana Pike, APRN CNP)18 hours ago (6:31 PM)   Hi, I've been having a flare up the past couple of days and have nothing to help with it. My appointment with my new rheumatologist isn't till next June which sucks. But in the meantime couple you please send some oxycodone to [facility] pharmacy in [place] to help get me through this thank you.      Routed to provider.     Cha, RN-BSN  [facility]

## 2024-07-26 NOTE — TELEPHONE ENCOUNTER
Signed Prescriptions:                        Disp   Refills    oxyCODONE (ROXICODONE) 5 MG tablet         42 tab*0        Sig: Take 1 tablet (5 mg) by mouth every 6 hours as needed           for severe pain Max of 3/day. Fill/begin           7/26/2024 Short term script for acute pain flare.  Authorizing Provider: SUSANA PETTY        Reviewed [place]  July 26, 2024- no concerning fills.    Susana Petty APRN, RN CNP, FNP  [facility]  [facility]

## 2024-07-26 NOTE — TELEPHONE ENCOUNTER
See [employer] message sent to patient.     Susana GRANT RN CNP, FNP  [facility]

## 2024-07-27 ENCOUNTER — MYC MEDICAL ADVICE (OUTPATIENT)
Dept: RHEUMATOLOGY | Facility: CLINIC | Age: 59
End: 2024-07-27
Payer: COMMERCIAL

## 2024-07-27 DIAGNOSIS — M45.8 ANKYLOSING SPONDYLITIS OF SACRAL REGION (H): ICD-10-CM

## 2024-07-27 DIAGNOSIS — M06.9 RHEUMATOID ARTHRITIS INVOLVING MULTIPLE SITES, UNSPECIFIED WHETHER RHEUMATOID FACTOR PRESENT (H): Primary | ICD-10-CM

## 2024-07-29 ENCOUNTER — MYC REFILL (OUTPATIENT)
Dept: RHEUMATOLOGY | Facility: CLINIC | Age: 59
End: 2024-07-29
Payer: COMMERCIAL

## 2024-07-29 DIAGNOSIS — M45.8 ANKYLOSING SPONDYLITIS OF SACRAL REGION (H): ICD-10-CM

## 2024-07-29 DIAGNOSIS — M06.9 RHEUMATOID ARTHRITIS INVOLVING MULTIPLE SITES, UNSPECIFIED WHETHER RHEUMATOID FACTOR PRESENT (H): ICD-10-CM

## 2024-07-29 RX ORDER — TOFACITINIB 11 MG/1
11 TABLET, FILM COATED, EXTENDED RELEASE ORAL DAILY
Qty: 90 TABLET | Refills: 0 | Status: CANCELLED | OUTPATIENT
Start: 2024-07-29

## 2024-07-31 RX ORDER — TOFACITINIB 11 MG/1
11 TABLET, FILM COATED, EXTENDED RELEASE ORAL DAILY
Qty: 90 TABLET | Refills: 1 | Status: SHIPPED | OUTPATIENT
Start: 2024-07-31 | End: 2024-08-05

## 2024-07-31 NOTE — TELEPHONE ENCOUNTER
Addressed in a different encounter.reordered on 7/31/2024 by Juan Novak MD.   tofacitinib (XELJANZ XR) 11 MG 24 hr [id number]/2024

## 2024-07-31 NOTE — TELEPHONE ENCOUNTER
"Former Dr. Parada patient with RA,  establishing care with Dr. Novak.    Patient was scheduled in June 2024, but due to provider departure, was rescheduled for June 2025 with Dr. Novak.     Will get patient on cancellation list sooner.  Patient has been out of [facility] since Sunday. Please review refill.   Patient labs are due, pended for provider to review and sign. Patient agrees to get them done on Friday, 8/2/24.  Patient is experiencing a flare, and in \"so much pain\", it started before he ran out of [facility].  Unsure of the trigger.   Symptoms are all over joint pain, but especially in shoulders and ankles, slight swelling in his hands.  Denies redness in joints.    Please advise.    Renetta Swan RN              "

## 2024-08-01 RX ORDER — PREDNISONE 5 MG/1
TABLET ORAL
Qty: 35 TABLET | Refills: 1 | Status: SHIPPED | OUTPATIENT
Start: 2024-08-01 | End: 2024-10-07

## 2024-08-01 NOTE — TELEPHONE ENCOUNTER
Sorry to hear about the symptoms.  I am impressed that the symptoms reflect a flare of inflammatory arthritis.  In addition to continuing Xeljanz, I recommend prednisone 15 mg once daily for 1 week, then 10 mg daily for 1 week.  Call rheumatology for persistence of symptoms after completing prednisone taper.

## 2024-08-01 NOTE — TELEPHONE ENCOUNTER
Spoke to patient regarding the pain.    Patient states he is in pain in every joint, but he feels worst pain is in shoulders and elbows.  Patient denies redness,swelling or warmth in his joints    Patient does take Oxycodone and buprenorfine from [facility], Tylenol and Ibuprofen 2 tabs each twice daily, warm baths/showers    Patient states Prednisone makes him anxious, but would not be opposed to 5 mg dose if this was recommended.    Patient encouraged to reach out to the Pain [facility].   He agrees to plan.      Renetta Swan RN

## 2024-08-02 ENCOUNTER — OFFICE VISIT (OUTPATIENT)
Dept: FAMILY MEDICINE | Facility: CLINIC | Age: 59
End: 2024-08-02
Payer: COMMERCIAL

## 2024-08-02 VITALS
SYSTOLIC BLOOD PRESSURE: 132 MMHG | TEMPERATURE: 97.6 F | RESPIRATION RATE: 18 BRPM | HEART RATE: 75 BPM | HEIGHT: 76 IN | DIASTOLIC BLOOD PRESSURE: 88 MMHG | WEIGHT: 291 LBS | OXYGEN SATURATION: 96 % | BODY MASS INDEX: 35.44 KG/M2

## 2024-08-02 DIAGNOSIS — M45.8 ANKYLOSING SPONDYLITIS OF SACRAL REGION (H): ICD-10-CM

## 2024-08-02 DIAGNOSIS — K59.03 DRUG-INDUCED CONSTIPATION: ICD-10-CM

## 2024-08-02 DIAGNOSIS — R06.09 DYSPNEA ON EXERTION: ICD-10-CM

## 2024-08-02 DIAGNOSIS — M06.9 RHEUMATOID ARTHRITIS INVOLVING MULTIPLE SITES, UNSPECIFIED WHETHER RHEUMATOID FACTOR PRESENT (H): ICD-10-CM

## 2024-08-02 DIAGNOSIS — I10 ESSENTIAL HYPERTENSION WITH GOAL BLOOD PRESSURE LESS THAN 140/90: Primary | ICD-10-CM

## 2024-08-02 LAB
ALBUMIN SERPL BCG-MCNC: 4.2 G/DL (ref 3.5–5.2)
ALT SERPL W P-5'-P-CCNC: 31 U/L (ref 0–70)
ANION GAP SERPL CALCULATED.3IONS-SCNC: 10 MMOL/L (ref 7–15)
AST SERPL W P-5'-P-CCNC: 32 U/L (ref 0–45)
BUN SERPL-MCNC: 15 MG/DL (ref 6–20)
CALCIUM SERPL-MCNC: 9.1 MG/DL (ref 8.8–10.4)
CHLORIDE SERPL-SCNC: 101 MMOL/L (ref 98–107)
CREAT SERPL-MCNC: 1.03 MG/DL (ref 0.67–1.17)
CRP SERPL-MCNC: <3 MG/L
EGFRCR SERPLBLD CKD-EPI 2021: 85 ML/MIN/1.73M2
ERYTHROCYTE [DISTWIDTH] IN BLOOD BY AUTOMATED COUNT: 13.3 % (ref 10–15)
ERYTHROCYTE [SEDIMENTATION RATE] IN BLOOD BY WESTERGREN METHOD: 14 MM/HR (ref 0–20)
GLUCOSE SERPL-MCNC: 103 MG/DL (ref 70–99)
HCO3 SERPL-SCNC: 27 MMOL/L (ref 22–29)
HCT VFR BLD AUTO: 44.1 % (ref 40–53)
HGB BLD-MCNC: 14.7 G/DL (ref 13.3–17.7)
MCH RBC QN AUTO: 29.9 PG (ref 26.5–33)
MCHC RBC AUTO-ENTMCNC: 33.3 G/DL (ref 31.5–36.5)
MCV RBC AUTO: 90 FL (ref 78–100)
PLATELET # BLD AUTO: 256 10E3/UL (ref 150–450)
POTASSIUM SERPL-SCNC: 4.8 MMOL/L (ref 3.4–5.3)
RBC # BLD AUTO: 4.91 10E6/UL (ref 4.4–5.9)
SODIUM SERPL-SCNC: 138 MMOL/L (ref 135–145)
WBC # BLD AUTO: 8.6 10E3/UL (ref 4–11)

## 2024-08-02 PROCEDURE — 80048 BASIC METABOLIC PNL TOTAL CA: CPT

## 2024-08-02 PROCEDURE — 86140 C-REACTIVE PROTEIN: CPT

## 2024-08-02 PROCEDURE — 84450 TRANSFERASE (AST) (SGOT): CPT

## 2024-08-02 PROCEDURE — 85652 RBC SED RATE AUTOMATED: CPT

## 2024-08-02 PROCEDURE — 36415 COLL VENOUS BLD VENIPUNCTURE: CPT

## 2024-08-02 PROCEDURE — 84460 ALANINE AMINO (ALT) (SGPT): CPT

## 2024-08-02 PROCEDURE — 82040 ASSAY OF SERUM ALBUMIN: CPT

## 2024-08-02 PROCEDURE — 99214 OFFICE O/P EST MOD 30 MIN: CPT

## 2024-08-02 PROCEDURE — 85027 COMPLETE CBC AUTOMATED: CPT

## 2024-08-02 RX ORDER — LOSARTAN POTASSIUM 25 MG/1
25 TABLET ORAL DAILY
Qty: 90 TABLET | Refills: 3 | Status: SHIPPED | OUTPATIENT
Start: 2024-08-02

## 2024-08-02 RX ORDER — DOCUSATE SODIUM 100 MG/1
200 CAPSULE, LIQUID FILLED ORAL 2 TIMES DAILY
Qty: 60 CAPSULE | Refills: 10 | Status: SHIPPED | OUTPATIENT
Start: 2024-08-02

## 2024-08-02 ASSESSMENT — PATIENT HEALTH QUESTIONNAIRE - PHQ9
10. IF YOU CHECKED OFF ANY PROBLEMS, HOW DIFFICULT HAVE THESE PROBLEMS MADE IT FOR YOU TO DO YOUR WORK, TAKE CARE OF THINGS AT HOME, OR GET ALONG WITH OTHER PEOPLE: VERY DIFFICULT
SUM OF ALL RESPONSES TO PHQ QUESTIONS 1-9: 11
SUM OF ALL RESPONSES TO PHQ QUESTIONS 1-9: 11

## 2024-08-02 ASSESSMENT — PAIN SCALES - GENERAL: PAINLEVEL: EXTREME PAIN (8)

## 2024-08-02 NOTE — PATIENT INSTRUCTIONS
At [facility], we strive to deliver an exceptional experience to you, every time we see you. If you receive a survey, please let us know what we are doing well and/or what we could improve upon, as we do value your feedback.  If you have MyChart, you can expect to receive results automatically within 24 hours of their completion.  Your provider will send a note interpreting your results as well.   If you do not have MyChart, you should receive your results in about a week by mail.    Your care team:                            Family Medicine Internal Medicine   MD Paul Coy, MD Renata Bergman, MD Inocencio Sue, MD Katarina London, PAErnieC    Meño Swan, MD Pediatrics   Nicolette Taylor, MD Mayra Mcmahon, MD Phoebe Jones, APRN CNP Dottie Morales APRN CNP   MD Paula Rubio, MD Minda Monzon, CNP     Axel Dillon, CNP Same-Day Provider (No follow-up visits)   JUANITA Garcia, DNP Jailene Turcios, JUANITA Josue, FNP, BC JAUN RuanoC     Clinic hours: Monday - Thursday 7 am-6 pm; Fridays 7 am-5 pm.   Urgent care: Monday - Friday 10 am- 8 pm; Saturday and Sunday 9 am-5 pm.    Clinic: [phone]       [facility]: Monday - Thursday 8 am - 7 pm; Friday 8 am - 6 pm  [facility]: [phone]

## 2024-08-02 NOTE — PROGRESS NOTES
"  Assessment & Plan     Essential hypertension with goal blood pressure less than 140/90  - Stable since switching to losartan. Tolerating without adverse effects.  - Refilled losartan at current dose. Recheck BMP.   - losartan (COZAAR) 25 MG tablet  Dispense: 90 tablet; Refill: 3  - Basic metabolic panel  (Ca, Cl, CO2, Creat, Gluc, K, Na, BUN)    Drug-induced constipation  - Increase to 200 mg docusate BID. Continue fiber supplement, adequate fluid intake.  - Miralax PRN.   - docusate sodium (COLACE) 100 MG capsule  Dispense: 60 capsule; Refill: 10    Dyspnea on exertion  - New this year. Unable to do normal household activities (vacuuming, dishes) without getting sweaty and out of breath. Check stress test. May be related to deconditioning.   - Echocardiogram Exercise Stress    Rheumatoid arthritis involving multiple sites, unspecified whether rheumatoid factor present (H)  - Follows with rheumatology. Due for labs.  - CBC with platelets  - AST  - ALT  - Albumin level  - CRP inflammation  - Erythrocyte sedimentation rate auto    Ankylosing spondylitis of sacral region (H)  - Follows with rheumatology. Due for labs.  - CBC with platelets  - AST  - ALT  - Albumin level  - CRP inflammation  - Erythrocyte sedimentation rate auto    BMI  Estimated body mass index is 35.89 kg/m  as calculated from the following:    Height as of this encounter: 1.918 m (6' 3.5\").    Weight as of this encounter: 132 kg (291 lb).   Weight management plan: Discussed healthy diet and exercise guidelines    Return to care if symptoms worsen or fail to improve.     Nahun Swift is a 58 year old, presenting for the following health issues:  Hypertension      8/2/2024    10:57 AM   Additional Questions   Roomed by candice   Accompanied by self     History of Present Illness       Hypertension: He presents for follow up of hypertension.  He does check blood pressure  regularly outside of the clinic. Outside blood pressures have been over " "140/90. He does not follow a low salt diet.     Reason for visit:  Follow up    He eats 2-3 servings of fruits and vegetables daily.He consumes 1 sweetened beverage(s) daily.He exercises with enough effort to increase his heart rate 10 to 19 minutes per day.  He exercises with enough effort to increase his heart rate 4 days per week.   He is taking medications regularly.     Stopped beta blocker at last visit due to bradycardia. BP stable. Less dizziness. HR in normal range. Tolerating losartan without adverse effects.    Dyspnea on exertion, exercise intolerance x 1 year. No chest pain.     Review of Systems  Constitutional, HEENT, cardiovascular, pulmonary, gi and gu systems are negative, except as otherwise noted.      Objective    /88 (BP Location: Left arm, Patient Position: Chair, Cuff Size: Adult Large)   Pulse 75   Temp 97.6  F (36.4  C) (Temporal)   Resp 18   Ht 1.918 m (6' 3.5\")   Wt 132 kg (291 lb)   SpO2 96%   BMI 35.89 kg/m    Body mass index is 35.89 kg/m .    Physical Exam   GENERAL: alert and no distress  NECK: no adenopathy, no asymmetry, masses, or scars  RESP: lungs clear to auscultation - no rales, rhonchi or wheezes  CV: regular rate and rhythm, normal S1 S2, no S3 or S4, no murmur, click or rub, no peripheral edema  ABDOMEN: soft, nontender, no hepatosplenomegaly, no masses and bowel sounds normal  MS: no gross musculoskeletal defects noted, no edema  NEURO: Normal strength and tone, mentation intact and speech normal  PSYCH: mentation appears normal, affect normal/bright      Signed Electronically by: JUANITA Curran CNP    "

## 2024-08-05 ENCOUNTER — VIRTUAL VISIT (OUTPATIENT)
Dept: RHEUMATOLOGY | Facility: CLINIC | Age: 59
End: 2024-08-05
Payer: COMMERCIAL

## 2024-08-05 DIAGNOSIS — M06.9 RHEUMATOID ARTHRITIS INVOLVING MULTIPLE SITES, UNSPECIFIED WHETHER RHEUMATOID FACTOR PRESENT (H): ICD-10-CM

## 2024-08-05 DIAGNOSIS — M45.8 ANKYLOSING SPONDYLITIS OF SACRAL REGION (H): ICD-10-CM

## 2024-08-05 DIAGNOSIS — F32.A DEPRESSION, UNSPECIFIED DEPRESSION TYPE: ICD-10-CM

## 2024-08-05 PROCEDURE — 99214 OFFICE O/P EST MOD 30 MIN: CPT | Mod: 95 | Performed by: INTERNAL MEDICINE

## 2024-08-05 PROCEDURE — G2211 COMPLEX E/M VISIT ADD ON: HCPCS | Mod: 95 | Performed by: INTERNAL MEDICINE

## 2024-08-05 RX ORDER — METHOTREXATE 2.5 MG/1
20 TABLET ORAL
Qty: 96 TABLET | Refills: 2 | Status: SHIPPED | OUTPATIENT
Start: 2024-08-05 | End: 2024-10-07

## 2024-08-05 RX ORDER — FOLIC ACID 1 MG/1
3000 TABLET ORAL DAILY
Qty: 270 TABLET | Refills: 2 | Status: SHIPPED | OUTPATIENT
Start: 2024-08-05 | End: 2024-10-07

## 2024-08-05 RX ORDER — TOFACITINIB 11 MG/1
11 TABLET, FILM COATED, EXTENDED RELEASE ORAL DAILY
Qty: 90 TABLET | Refills: 2 | Status: SHIPPED | OUTPATIENT
Start: 2024-08-05 | End: 2024-10-07

## 2024-08-05 NOTE — PROGRESS NOTES
Jamison is a 58 year old who is being evaluated via a billable video visit.    How would you like to obtain your AVS? MyChart  If the video visit is dropped, the invitation should be resent by: Text to cell phone: [phone]  Will anyone else be joining your video visit? No    Video-Visit Details    Type of service:  Video Visit   Video End Time:0907  Originating Location (pt. Location): Home    Distant Location (provider location):  On-site  Platform used for Video Visit: [facility]      Rheumatology Clinic Visit  [facility]  Juan Novak M.D.     Jailene Breen MRN# [id number]   YOB: 1965 Age: 58 year old   Date of Visit: 08/05/2024  Primary care provider: Minda Monzon          Assessment and Plan:     # Seropositive rheumatoid arthritis (RF+, CCP+)    Patient relates modestly improved symmetric polyarthralgia and stiffness.  Video exam shows no gross swelling or asymmetry in MCPs, PIPs, wrists.  Range of motion is wrists, elbows, shoulders is preserved.    Data: On August 2, 2024, comprehensive metabolic panel was normal; CRP was less than 3; CBC normal; sed rate 14.    In 2016, rheumatoid factor elevated at 78 international units; cyclic citrullinated peptide antibodies greater than 340.    Imaging: MRI of the brain on March 22, 2024 showed scattered white matter T2 hyperintensities.    Discussion: Seropositive rheumatoid arthritis flare occurred upon discontinuation of tofacitinib in late July 2024.  Symptoms are prednisone sensitive, and within several days of restarting tofacitinib, symptoms have improved further.  I agree with Dr. Parada's previously established plan and regimen, including combination tofacitinib and low-dose methotrexate to manage inflammatory arthritis.  While using high risk medications, I recommend regular monitoring for renal function, liver function, blood counts, and intermittent testing for lipids.    Plan:  1.  Continue tofacitinib 11 mg long-acting,  once daily  2.  Continue methotrexate 8 tablets (20 mg) once weekly.While on methotrexate:   -- Check blood tests every 3 months (AST/ALT, Albumin, CBC with platelets)   -- Limit alcohol intake to 2 drinks weekly; use folate 1 mg daily.  --Tylenol 500-1000 mg can be used as needed up to three times daily for nausea/headache associated with dosing.    3.  While awaiting onset of full Xeljanz action, use ibuprofen 600 mg every 8 hours with food for 7 to 10 days, then resume as needed use, interspersed with acetaminophen 1000 mg 3 times daily for joint pain.  4.  Hold prednisone, due to increased anxiety/panic symptoms.    RTC 6 months    Juan Novak MD  Staff Rheumatologist, [facility]    On the day of the encounter, a total of 32 minutes was spent in chart review, and in counseling and coordination of care, regarding the patient's complex medical problem of seropositive rheumatoid arthritis, high risk medication.    The longitudinal plan of care for the diagnosis(es)/condition(s) as documented were addressed during this visit. Due to the added complexity in care, I will continue to support Les in the subsequent management and with ongoing continuity of care.    No orders of the defined types were placed in this encounter.             History of Present Illness:   Jailene Breen presents for follow-up of rheumatoid arthritis.  HE Has past medical history of depression, anxiety, obesity, seropositive rheumatoid arthritis.  He was last seen by Dr. Parada in December 2023.  At that visit, rheumatoid arthritis was under reasonable control.  Recommendation was to continue tofacitinib 11 mg daily, and methotrexate weekly.    RA background: He was diagnosed with seropositive rheumatoid arthritis in 2016, established care with Dr. Raygoza in 5/2016 and was started on methotrexate.  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 He was started on Humira in 12/2016 due to AS.  Discontinued methotrexate in 2/2017 due to lack of improvement. Humira was changed to Enbrel in 4/2019 due to lack of improvement.  Methotrexate was restarted in July 2020.  Etanercept added by Dr. Parada in 2021; stopped due to lack of efficacy in December 2023.  Tofacitinib started in early 2024.    Initial history August 5, 2024:    He was doing \"ok\" until he ran out of tofacitinib. He went without medication for a week in late July.  He rapidly developed worsening pain in shoulders, elbows, fingers, and ankles with increased stiffness lasting for more than 1 hour in the mornings.      He has noted \"flares\" of joint pain in recent months with days of stiffness, inability to move it freely, and throbbing pain in hands and feet.  Joint pain, especially shoulders, elbows, fingers, ankles have shooting characteristics.  No visible swelling. Pain is bilateral, symmetrical, + morning accentuation of joint pain, but he has trouble falling asleep as well. He restarted tofacitinib 2 days ago. Pain is improving since restart of xeljanz, and since he increased prednisone slightly to 10 mg daily.    He definitely thinks that xeljanz and methotrexate are beneficial. No AE  Takes methotrexate 8 tabs once a week.  Taking prednisone 5 mg daily; he finds that higher doses increases anxiety.   Takes ibu and acetaminophen, alternating, in the mornings, and at night.    He has noted some constipation recently; \"stools are hard\". Primary provider recently increased docusate.  He is active, walks the dog daily. He does note low exercise tolerance with excess sweating.    Interim History, Dr. Parada December 1, 2023: In the last couple months he had flare up, it starts in his hands, ankles and then in his shoulders. Can not lift his arm up. Both the shoulders were hurting bad. He is having diarrhea for last couple months. He has " started a new antidepressant. He is on Enbrel but has not taken Enbrel for a month due to diarrhea issues.  He thinks that Enbrel helps with his joint pains.  He is on methotrexate 4 tablets once a week but cannot tell if it helps him or not.  He has prednisone tablets and take 20 mg once a month when his symptoms worsens.  He does not like to take prednisone since his anxiety symptoms get worse.     He has lumbar degenerative disc disease and gets MAKAYLA.  He has noticed urinary retention issues sporadically.           Review of Systems:     Constitutional: negative  Skin: negative  Eyes: negative  Ears/Nose/Throat: negative  Respiratory: No shortness of breath, dyspnea on exertion, cough, or hemoptysis  Cardiovascular: negative  Gastrointestinal: negative  Genitourinary: negative  Musculoskeletal: negative  Neurologic: negative  Psychiatric: negative  Hematologic/Lymphatic/Immunologic: negative  Endocrine: negative         Active Problem List:     Patient Active Problem List    Diagnosis Date Noted    Chronic, continuous use of opioids 03/01/2024     Priority: Medium    Morbid obesity (H) 12/08/2022     Priority: Medium    Encounter for screening colonoscopy 03/25/2022     Priority: Medium    Major depressive disorder, recurrent episode, severe with mixed features (H) 04/01/2021     Priority: Medium    Suicidal ideation 03/16/2021     Priority: Medium    Immunocompromised (H24) 01/13/2021     Priority: Medium    Moderate major depression (H) 10/06/2020     Priority: Medium    Ankylosing spondylitis (H) 03/01/2017     Priority: Medium    Narcotic abuse, continuous (H) 02/20/2017     Priority: Medium     Formatting of this note might be different from the original.   was checked 2/20/17. 6 different controlled substances filled in February 2017 by 6 different providers plus fills in numerous cities over the last year but many providers. Should not be given any narcotics or benzodiazepines.      Generalized anxiety  disorder 10/31/2016     Priority: Medium    Panic attack 10/31/2016     Priority: Medium    Right-sided thoracic back pain, unspecified chronicity 10/10/2016     Priority: Medium    Tear meniscus knee, right, initial encounter 09/15/2016     Priority: Medium    Rheumatoid arthritis involving multiple sites, unspecified rheumatoid factor presence 09/15/2016     Priority: Medium     IMO Regulatory Load OCT 2020      Nonintractable migraine 01/25/2016     Priority: Medium    Essential hypertension with goal blood pressure less than 140/90 11/21/2013     Priority: Medium    Lumbar radiculopathy 11/21/2013     Priority: Medium    Chronic back pain greater than 3 months duration 11/11/2013     Priority: Medium    CARDIOVASCULAR SCREENING; LDL GOAL LESS THAN 160 03/06/2012     Priority: Medium    Obesity, Class I, BMI 30-34.9 03/06/2012     Priority: Medium    Overweight 03/06/2012     Priority: Medium    Drug dependence (H) 02/13/2012     Priority: Medium    Chronic pain 09/04/2011     Priority: Medium    Plantar fascial fibromatosis 04/23/2007     Priority: Medium     Formatting of this note might be different from the original.  Overview:   left  Formatting of this note might be different from the original.  left      Sprain of sacroiliac region 12/14/2006     Priority: Medium    Hyperlipidemia 09/01/2006     Priority: Medium    Contact dermatitis and eczema 11/24/2004     Priority: Medium    Dermatitis 11/24/2004     Priority: Medium    Esophageal reflux 11/24/2004     Priority: Medium    Hematuria 11/24/2004     Priority: Medium            Past Medical History:     Past Medical History:   Diagnosis Date    Ankylosing spondylitis (H) 03/01/2017    Anxiety     Arthritis     back, knees    Back pain 11/17/2013    possible drug seeking behavior in the past.    Gastroesophageal reflux disease     Hypertension     Overweight (BMI 25.0-29.9) 03/06/2012    Panic attacks     Syncope, unspecified syncope type 02/27/2017     Tobacco use disorder 6/19/2017     Past Surgical History:   Procedure Laterality Date    ARTHROSCOPY KNEE Right 09/22/2016    Procedure: ARTHROSCOPY KNEE;  Surgeon: Fredo Hughes MD;  Location: [facility] OR    COMBINED ESOPHAGOSCOPY, GASTROSCOPY, DUODENOSCOPY (EGD) WITH CO2 INSUFFLATION N/A 01/19/2021    Procedure: ESOPHAGOGASTRODUODENOSCOPY, WITH CO2 INSUFFLATION;  Surgeon: Brandon Mack MD;  Location: [facility] OR    ESOPHAGOSCOPY, GASTROSCOPY, DUODENOSCOPY (EGD), COMBINED N/A 01/19/2021    Procedure: Esophagogastroduodenoscopy, With Biopsy;  Surgeon: Brandon Mack MD;  Location: [facility] OR    ESOPHAGOSCOPY, GASTROSCOPY, DUODENOSCOPY (EGD), COMBINED N/A 05/27/2021    Procedure: ESOPHAGOGASTRODUODENOSCOPY (EGD);  Surgeon: Chester Lynn MD;  Location: [facility]    INJECT PARAVERTEBRAL FACET JOINT LUMBAR / SACRAL FIRST Right 11/25/2016    Procedure: INJECT PARAVERTEBRAL FACET JOINT LUMBAR / SACRAL FIRST;  Surgeon: Doretha Magallanes MD;  Location:  OR    ORTHOPEDIC SURGERY Right     knee    PLANTAR FASCIA RELEASE Left             Social History:     Social History     Socioeconomic History    Marital status:      Spouse name: Not on file    Number of children: 2    Years of education: Not on file    Highest education level: Not on file   Occupational History    Not on file   Tobacco Use    Smoking status: Never     Passive exposure: Never    Smokeless tobacco: Former     Types: Chew     Quit date: 12/12/2019    Tobacco comments:     still chews nicotine gum   Vaping Use    Vaping status: Never Used   Substance and Sexual Activity    Alcohol use: No     Comment: none    Drug use: Yes     Frequency: 7.0 times per week     Types: Marijuana     Comment: medicinal (oral)    Sexual activity: Yes     Partners: Female     Comment: decreased with Prozac   Other Topics Concern    Parent/sibling w/ CABG, MI or angioplasty before 65F 55M? Not Asked   Social History Narrative    Not on file     Social  Determinants of Health     Financial Resource Strain: Low Risk  (3/1/2024)    Financial Resource Strain     Within the past 12 months, have you or your family members you live with been unable to get utilities (heat, electricity) when it was really needed?: No   Food Insecurity: High Risk (3/1/2024)    Food Insecurity     Within the past 12 months, did you worry that your food would run out before you got money to buy more?: Yes     Within the past 12 months, did the food you bought just not last and you didn t have money to get more?: No   Transportation Needs: High Risk (3/1/2024)    Transportation Needs     Within the past 12 months, has lack of transportation kept you from medical appointments, getting your medicines, non-medical meetings or appointments, work, or from getting things that you need?: Yes   Physical Activity: Insufficiently Active (3/1/2024)    Exercise Vital Sign     Days of Exercise per Week: 3 days     Minutes of Exercise per Session: 20 min   Stress: Stress Concern Present (3/1/2024)    [employer] [place] [employer] - Occupational Stress Questionnaire     Feeling of Stress : Very much   Social Connections: Unknown (3/1/2024)    Social Connection and Isolation Panel [NHANES]     Frequency of Communication with Friends and Family: Not on file     Frequency of Social Gatherings with Friends and Family: Never     Attends Hindu Services: Not on file     Active Member of Clubs or Organizations: Not on file     Attends Club or Organization Meetings: Not on file     Marital Status: Not on file   Interpersonal Safety: Low Risk  (3/1/2024)    Interpersonal Safety     Do you feel physically and emotionally safe where you currently live?: Yes     Within the past 12 months, have you been hit, slapped, kicked or otherwise physically hurt by someone?: No     Within the past 12 months, have you been humiliated or emotionally abused in other ways by your partner or ex-partner?: No   Housing  "Stability: High Risk (3/1/2024)    Housing Stability     Do you have housing? : Yes     Are you worried about losing your housing?: Yes          Family History:     Family History   Problem Relation Age of Onset    Hypertension Mother     Diabetes Mother     Depression Mother     Mental Illness Mother     Coronary Stenting Father 62        possible MI    Lung Cancer Paternal Uncle     Substance Abuse Brother     Substance Abuse Brother     Autoimmune Disease No family hx of     Anesthesia Reaction No family hx of     Thrombophilia No family hx of     Bleeding Disorder No family hx of             Allergies:     Allergies   Allergen Reactions    Mirtazapine      Other reaction(s): Coma    Hydrocodone Itching    Mirtazapine Other (See Comments)     \"Blacked out\" for one week    Ms Contin [Morphine] Itching            Medications:     Current Outpatient Medications   Medication Sig Dispense Refill    atorvastatin (LIPITOR) 20 MG tablet Take 1 tablet (20 mg) by mouth daily 90 tablet 1    buprenorphine (SUBUTEX) 8 MG SUBL sublingual tablet Place 1 tablet (8 mg) under the tongue 3 times daily Fill on fill now. and start 7/6/2024.  30 day script. 90 tablet 0    docusate sodium (COLACE) 100 MG capsule Take 2 capsules (200 mg) by mouth 2 times daily 60 capsule 10    FLUoxetine (PROZAC) 40 MG capsule Take 2 capsules (80 mg) by mouth daily 180 capsule 0    folic acid (FOLVITE) 1 MG tablet Take 3 tablets (3,000 mcg) by mouth daily 270 tablet 1    losartan (COZAAR) 25 MG tablet Take 1 tablet (25 mg) by mouth daily 90 tablet 3    medical cannabis (Patient's own supply) See Admin Instructions (The purpose of this order is to document that the patient reports taking medical cannabis.  This is not a prescription, and is not used to certify that the patient has a qualifying medical condition.)     Pills PRN   Lozenges PRN      methotrexate 2.5 MG tablet Take 8 tablets (20 mg) by mouth every 7 days 96 tablet 1    naloxone (NARCAN) 4 " MG/0.1ML nasal spray Spray 1 spray (4 mg) into one nostril alternating nostrils as needed for opioid reversal every 2-3 minutes until assistance arrives 0.2 mL 1    ondansetron (ZOFRAN ODT) 4 MG ODT tab Take 1 tablet (4 mg) by mouth every 8 hours as needed for nausea 30 tablet 3    oxyCODONE (ROXICODONE) 5 MG tablet Take 1 tablet (5 mg) by mouth every 6 hours as needed for severe pain Max of 3/day. Fill/begin 7/26/2024 Short term script for acute pain flare. 42 tablet 0    prazosin (MINIPRESS) 2 MG capsule Take 1 capsule (2 mg) by mouth at bedtime 90 capsule 0    predniSONE (DELTASONE) 5 MG tablet Take 3 tabs daily for 1 week, then take 2 tabs daily for 1 week 35 tablet 1    QUEtiapine (SEROQUEL) 100 MG tablet Take 1 tablet (100 mg) by mouth at bedtime 30 tablet 1    QUEtiapine (SEROQUEL) 25 MG tablet Take 1-2 tablets (25-50 mg) by mouth daily as needed (Anxiety) 60 tablet 0    tiZANidine (ZANAFLEX) 4 MG tablet Take 1 tablet (4 mg) by mouth 3 times daily as needed for muscle spasms 3 month supply 225 tablet 1    tofacitinib (XELJANZ XR) 11 MG 24 hr tablet Take 1 tablet (11 mg) by mouth daily Hold for signs of infection, then seek medical attention. 90 tablet 1            Physical Exam:   There were no vitals taken for this visit.  Wt Readings from Last 6 Encounters:   08/02/24 132 kg (291 lb)   06/10/24 128.5 kg (283 lb 3.2 oz)   03/01/24 128.8 kg (284 lb)   12/08/22 131.5 kg (290 lb)   05/02/22 122.7 kg (270 lb 8 oz)   03/16/22 125.3 kg (276 lb 3.2 oz)     There is no height or weight on file to calculate BMI.  Constitutional: well-developed, appearing stated age; cooperative  Eyes: nl EOM, PERRLA, vision, conjunctiva, sclera  ENT: nl external ears, nose, hearing, lips, teeth, gums  Neck: no mass or thyroid enlargement  Resp: Breathing is unlabored  MS: PIPs, MCPs, wrists, elbows, and shoulders show full unrestricted range of motion.  Neuro: nl cranial nerves, strength, sensation, DTRs.   Psych: nl judgement,  orientation, memory, affect.         Data:     @      Latest Ref Rng & Units 3/1/2024    10:16 AM 6/10/2024     1:02 PM 8/2/2024    11:25 AM   RHEUM RESULTS   Albumin 3.5 - 5.2 g/dL 4.8   4.2    ALT 0 - 70 U/L 29   31    AST 0 - 45 U/L 29   32    Creatinine 0.67 - 1.17 mg/dL 1.02   1.03    CRP Inflammation <5.00 mg/L   <3.00    GFR Estimate >60 mL/min/1.73m2 86   85    Hematocrit 40.0 - 53.0 %  43.3  44.1    Hemoglobin 13.3 - 17.7 g/dL  14.3  14.7    WBC 4.0 - 11.0 10e3/uL  8.0  8.6    RBC Count 4.40 - 5.90 10e6/uL  4.77  4.91    RDW 10.0 - 15.0 %  13.4  13.3    MCHC 31.5 - 36.5 g/dL  33.0  33.3    MCV 78 - 100 fL  91  90    Platelet Count 150 - 450 10e3/uL  255  256    Sed Rate 0 - 20 mm/hr   14        Rheumatoid Factor   Date Value Ref Range Status   04/21/2016 78 (H) <20 IU/mL Final   ,   Cyclic Citrullinated Peptide Antibody, IgG   Date Value Ref Range Status   04/21/2016 >340 (H) <7 U/mL Final   ,  ,  ,  ,  ,  ,   LEOBARDO Screen by EIA   Date Value Ref Range Status   04/21/2016 <1.0  Interpretation:  Negative   <1.0 Final   ,  ,  ,  ,  ,  ,  ,   Hepatitis B Core [place]   Date Value Ref Range Status   07/13/2020 Nonreactive NR^Nonreactive Final     Hepatitis B Core Antibody Total   Date Value Ref Range Status   05/17/2023 Nonreactive Nonreactive Final   ,   Hep B Surface Agn   Date Value Ref Range Status   07/13/2020 Nonreactive NR^Nonreactive Final     Hepatitis B Surface Antigen   Date Value Ref Range Status   05/17/2023 Nonreactive Nonreactive Final   ,  ,  ,  ,   Quantiferon-TB Gold Plus   Date Value Ref Range Status   05/17/2023 Negative Negative Final     Comment:     No interferon gamma response to M.tuberculosis antigens was detected. Infection with M.tuberculosis is unlikely, however a single negative result does not exclude infection. In patients at high risk for infection, a second test should be considered in accordance with the 2017 ATS/IDSA/CDC Clinical Pract  ice Guidelines for Diagnosis of Tuberculosis  in Adults and Children      TB1 Ag minus Nil Value   Date Value Ref Range Status   05/17/2023 0.00 IU/mL Final     TB2 Ag minus Nil Value   Date Value Ref Range Status   05/17/2023 0.00 IU/mL Final     Mitogen minus Nil Result   Date Value Ref Range Status   05/17/2023 9.95 IU/mL Final     Nil Result   Date Value Ref Range Status   05/17/2023 0.05 IU/mL Final   ,  ,  ,  ,  ,  ,  ,  ,  ,  ,  ,  ,  ,  ,  ,  ,  ,  ,  ,  ,  ,  ,  ,  ,  ,

## 2024-08-05 NOTE — PATIENT INSTRUCTIONS
Diagnosis:  1.  Seropositive rheumatoid arthritis: Joint pain is improved after restart of tofacitinib (Xeljanz) 2 days ago.  I recommend continued combination Xeljanz and methotrexate.    Plan:  1.  Continue tofacitinib 11 mg long-acting, once daily  2.  Continue methotrexate 8 tablets (20 mg) once weekly.While on methotrexate:   -- Check blood tests every 3 months (AST/ALT, Albumin, CBC with platelets)   -- Limit alcohol intake to 2 drinks weekly; use folate 1 mg daily.  --Tylenol 500-1000 mg can be used as needed up to three times daily for nausea/headache associated with dosing.    3.  While awaiting onset of full Xeljanz action, use ibuprofen 600 mg every 8 hours with food for 7 to 10 days, then resume as needed use, interspersed with acetaminophen 1000 mg 3 times daily for joint pain.  4.  Hold prednisone, due to increased anxiety/panic symptoms.

## 2024-08-08 ENCOUNTER — MYC MEDICAL ADVICE (OUTPATIENT)
Dept: PALLIATIVE MEDICINE | Facility: CLINIC | Age: 59
End: 2024-08-08
Payer: COMMERCIAL

## 2024-08-08 DIAGNOSIS — M05.79 RHEUMATOID ARTHRITIS INVOLVING MULTIPLE SITES WITH POSITIVE RHEUMATOID FACTOR (H): ICD-10-CM

## 2024-08-08 DIAGNOSIS — F11.20 UNCOMPLICATED OPIOID DEPENDENCE (H): ICD-10-CM

## 2024-08-08 DIAGNOSIS — M45.7 ANKYLOSING SPONDYLITIS OF LUMBOSACRAL REGION (H): ICD-10-CM

## 2024-08-08 DIAGNOSIS — M25.50 MULTIPLE JOINT PAIN: ICD-10-CM

## 2024-08-08 DIAGNOSIS — M47.817 SPONDYLOSIS WITHOUT MYELOPATHY OR RADICULOPATHY, LUMBOSACRAL REGION: ICD-10-CM

## 2024-08-08 DIAGNOSIS — F11.90 CHRONIC, CONTINUOUS USE OF OPIOIDS: ICD-10-CM

## 2024-08-08 DIAGNOSIS — M51.369 DDD (DEGENERATIVE DISC DISEASE), LUMBAR: ICD-10-CM

## 2024-08-08 DIAGNOSIS — M54.59 LUMBAR FACET JOINT PAIN: ICD-10-CM

## 2024-08-08 NOTE — TELEPHONE ENCOUNTER
Please process a refill of  buprenorphine (SUBUTEX) 8 MG SUBL sublingual tablet     [id number] - [place], [place] - [address]  [address] [address]  Phone: [phone] Fax: [phone]

## 2024-08-09 RX ORDER — BUPRENORPHINE 8 MG/1
8 TABLET SUBLINGUAL 3 TIMES DAILY
Qty: 90 TABLET | Refills: 0 | Status: SHIPPED | OUTPATIENT
Start: 2024-08-09 | End: 2024-08-20

## 2024-08-09 NOTE — TELEPHONE ENCOUNTER
Medication refill information reviewed.     Due date for buprenorphine (SUBUTEX) 8 MG SUBL sublingual tablet  was 8/5/2024     Prescriptions prepped for review.     Will route to provider.

## 2024-08-09 NOTE — TELEPHONE ENCOUNTER
Received call from patient requesting refill(s) of buprenorphine (SUBUTEX) 8 MG SUBL sublingual tablet     Last dispensed from pharmacy on 7/6/2024.    Patient's last office/virtual visit by prescribing provider on 5/20/2024.  Next office/virtual appointment scheduled for 8/20/2024.    Last urine drug screen date 5/20/2024.  Current opioid agreement on file (completed within the last year) Yes Date of opioid agreement: 5/20/2024.    E-prescribe to:    [id number] - [place], [place] - [address]    Will route to nursing [place] for review and preparation of prescription(s).

## 2024-08-10 NOTE — TELEPHONE ENCOUNTER
Chart reviewed - Patient of Susana Pike CNP. Request appears appropriate.  checked, no concerns. Refilled for 30 day supply.     Charlotte Burroughs DNP, APRN, AGNP-C  [facility]

## 2024-08-14 DIAGNOSIS — E78.5 DYSLIPIDEMIA: ICD-10-CM

## 2024-08-14 RX ORDER — ATORVASTATIN CALCIUM 20 MG/1
20 TABLET, FILM COATED ORAL DAILY
Qty: 100 TABLET | Refills: 2 | OUTPATIENT
Start: 2024-08-14

## 2024-08-19 NOTE — PROGRESS NOTES
Jamison is a 58 year old who is being evaluated via a billable video visit.    How would you like to obtain your AVS? [facility]  If the video visit is dropped, the invitation should be resent by: [facility]. Cell phone: [phone]  Will anyone else be joining your video visit? No  Is Pt currently in [place]? Yes    Nicolasa Krueger MA      NOTE:  If Pt is not in [place], Appointment needs to be canceled and rescheduled.      Video-Visit Details    Type of service:  Video Visit   Video start: 0834    Video End Time:9:03 AM  Originating Location (pt. Location): Home    Distant Location (provider location):  On-site  Platform used for Video Visit: [facility]      8/20/2024        Chief complaint:   -Bilateral low back pain right > Left. Denies radiation into the legs  -Hands, wrists, shoulders, elbows, and bilateral knee pain  -Joint pain is the most bothersome from RA  -seeing new rheumatologist      Interval history:   Jailene Breen is a 58 year old male is known to me for   Lumbar spondylosis, pain is worse with extension and rotation indicating a facetogenic component to pain  Lumbar degenerative disc disease  SI joint pain  Ankylosing spondylitis  Myofascial pain  Chronic pain syndrome  PMHx includes: Panic attacks, back pain, arthritis, anxiety, ankylosing spondylitis  PSHx includes: Right knee surgery ×2, left foot surgery right knee arthroscopy        Recommendations/plan at the last visit on 5/20/2024 included:  Check out Shmuel Murray online for gentle exercise  Physical Therapy:  Referral to PHYSICAL THERAPY  Check out Shmuel Murray on YouTube. , easy, gentle exercises 30-90 seconds with excellent technique  Clinical Health Psychologist: continue work with your psychiatrist and therapist  Diagnostic Studies:  None  Medication Management:    continue Subutex 8mg  three times per day starting today. Next script fill 5/28 and start  "5/30  Continue medical cannabis, re-cert today  continue tizanidine 2-4mg three times daily as needed for muscle spasms  Continue medical cannabis  Further procedures recommended: none  UDT today  Last used Buprenorphine this morning  Resigned CSA today  Recommendations to PCP: see above   Follow up:12 weeks in-person/virtual. Please call [phone] to make your follow-up appointment with me.         Since his last visit, Jailene Breen reports:    Interval history August 20, 2024  -he has really struggled the last 2 months.  -his bedroom is all torn up as it is leaking water. He is trying to do some of the work himself.   -this is stressful for him as it activity is different than he is used to.   -his back and his hips are bothering him due to increased activity.    -he tries to avoid use of prednisone as it makes him feel more anxious and can lead to panic attacks.   -he has known RA and Ankylosing Spondylitis  -he also has axial low back pain  -he has looked up Shmuel Murray but has not done any of his short exercises.   -he states he is supposed to have a stress test in the future  -notes his weight is getting close to 300 lbs. Discussed referral to comprehensive weight management program, patient interested.           Interval history May 20, 2024  -his bilateral low back pain without radiation has improved a little bit  -pain is worse after using the riding .  -helping his wife, she had knee replacement surgery.   Bilateral low back pain right > Left. Denies radiation into the legs  -Hands, wrists, shoulders, elbows, and bilateral knee pain  -Joint pain is the most bothersome from RA  -seeing new rheumatologist    Interval history February 19, 2024  -he has not been sleeping well.   -he has issues with nausea, he will feel hungry and then when he gets his food he will feel nausea and \"feel pukey.\"  -his wife had knee replacement surgery about 2 weeks ago.  -they have a dog that was recently " "fixed and he has been the one caring for the dog.   -he does use medical cannabis and he does feel that this is somewhat helpful for his nausea and helpful for pain.   -he is not on anything for reducing acid production in his gut, he states that his nausea and vomiting feels very acidic.   -he desires a repeat of lumbar RFA as his low back pain is worse, especially with lumbar extension and ext/rotation to the right and to the left.   -has been transitioned to Xeljanz but has not been able to take it regularly due to not feeling well.      Interval history November 15, 2023  -currently had COVID infection  -pain is worse right now as he cannot take his antirheumatological meds as he has COVID.   -pain is worse everywhere as he is sick and feverish and his joints are more painful.           At this point, the patient's participation with our multidisciplinary team includes:  The patient has been compliant with the program.  PT - last PHYSICAL THERAPY with Asif Saldana on 12/23/2020  Health Psych - worked with  Padilla Keene, last on 11/6/2018.  Working with Jonna Farrell PhD and been seen >8 times. Last visit with Santa was on 9/2/2020         Pain scores:    Pain intensity on average is 6-7 on a scale of 0-10.    Range is 5-9/10. When his pain is bad, it can stay elevated for a few days  Pain right now is 9/10.   Pain is described as \"sharp, throbbing, stabbing, burning, stiffness.\"  Pain is constant in nature      Current pain relevant medications:      -nortriptyline 50mg at bedtime (helpful)  -ibuprofen 800mg TID PRN (using 3 times daily-helpful)  -Tylenol 500mg using 1000mg TID (unsure if helpful)  -Maxalt 10mg PRN migraine (helpful for migraine--he does have visual aura)  -naloxone nasal spray PRN for accidental opiate overdose  -Subutex 8 mg TID (somewhat helpful)  -medical cannabis PRN (helpful)      Other pertinent medications:   -Fluoxetine 80mg every day (somewhat helpful, managed by " psychiatry)  -quetiapine 150mg at bedtime  -Xeljanz 11mg every 24 hours for RA (he was able to start this short term but had to stop this again due to illness)      Previous Medications:   OPIATES: Oxycodone (helpful), Fentanyl (100mcg/hr patch helpful), hydrocodone (itching), Tramadol (not helpful), Suboxone (somewhat helpful)  NSAIDS: ibuprofen (not helpful), Aleve (not helpful)  MUSCLE RELAXANTS: methocarbamol (somewhat helpful), Flexeril (somewhat helpful but caused severe urinary retention requiring catheterization), tizanidine (unsure if helpful), metaxalone (unsure), SOMA (helpful), chlorzoxazone (helped some)  ANTI-MIGRAINE MEDS: Maxalt (helpful for migraine), Imitrex injection (somewhat helpful)  ANTI-DEPRESSANTS: Prozac (helpful), Cymbalta (felt weird on it), nortriptyline (unsure if helpful), Buspar (unsure), Remeron (blacked out), bupropion (not helpful for smoking cessation)  SLEEP AIDS: Ambien (helpful)  ANTI-CONVULSANTS: gabapentin (felt odd on this medication, unsure of dose), Lyrica (not helpful for 4 months, unsure of dose), Topamax (not helpful, he felt weird on it)   TOPICALS: Lidocaine patches (not helpful), Voltaren gel (not helpful)  Other meds: Tylenol (unsure if helpful)        Other treatments have included:   Jailene Breen has been seen at a pain clinic in the past.  [facility]  PT: tried, not helpful  Chiropractic: tried, not helpful  Acupuncture: none  TENs Unit: tried, helpful         Injections:     -has had injections at outside clinics  -has had epidural injections for low back pain (one was helpful)  -he has had lumbar medial branch blocks at [facility] and he was going to have lumbar radiofrequency ablation (did not do this due to cost)  -4/3/2017 right LMBBs with Dr. Ashlyn Gamble (helpful)  -4/26/2017 right LMBBs with Dr. Ashlyn Gamble (helpful)  -5/31/2017 right L3, L4, L5 RFA with Dr. Ashlyn Gamble (good relief for over 6 months)  -3/15/2018 right L5  "transforaminal MAKAYLA with Dr. Ashlyn Gamble (not helpful)  -5/10/2018 trigger point injections with Dr. Ashlyn Gamble(somewhat helpful).  -7/12/2018 Right L3, L4, L5 RFA with Dr. Ashlyn Gamble (helpful).  -9/20/2018 Left piriformis injections, bilateral gluteal trigger point with Dr. Gamble (helpful).  -1/31/2019 right L2-3, L3-4 and L4-5 facet joint injections with Dr. Ashlyn Gamble (somewhat helpful)  4/4/2019  right L3, L4, L5/sacral ala lumbar radiofrequency ablation with Dr. Gamble (helpful over right side)  6/28/2019 Bilateral facet joint injections at l4-5, L5-S1 with Dr. Holley (only helpful for 7 days)  -2/12/2020 right A1-J4-A8-sacral ALA RFA done with Dr. Ashlyn Gamble (helpful)  -8/26/2020 left L3-5 lumbar medial branch blocks #1 with Dr. Ashlyn Gamble (very helpful)  -11/11/2020 left L3-5 lumbar medial branch blocks #2 with Dr. Ashlyn Gamble  (helpful)   -2/1/2021 bilateral lumbar RFA L4-5 and L5-S1 with Dr. Ashlyn Gamble   (this \"helped a little bit\" and then he tweaked his back about 2 weeks after it was done and it wasn't helpful)  -09/22/2022 lumbosacral RFA L4, L5 and sacral ala with Dr. Aguilar Holley (helpful,, at least 50% relief, he has a RA flare right now, so harder to tell)  -7/20/2023 bilateral lumbar RFA at L4, L5 and Sacral ala with Dr. Aguilar Holley (helpful, has reduced pain by at least 50-60% in the lower back)  -4/11/2024 bilateral lumbar RFA at L4, L5 and S1 by Dr. Aguilar Holley (has reduced pain by at least 50% about 5 weeks post-procedure. Can take up to 8 weeks to reach maximum improvement)      THE 4 A's OF OPIOID MAINTENANCE ANALGESIA    Analgesia: Subutex is helpful    Activity: ADLs, doing some home projects due to water damage. Cares for his dogs    Adverse effects: none    Adherence to Rx protocol: yes          Side Effects: no side effect   Patient is using the medication as prescribed: YES     Medications:  Current Outpatient Medications   Medication Sig Dispense " Refill    atorvastatin (LIPITOR) 20 MG tablet Take 1 tablet (20 mg) by mouth daily 90 tablet 1    buprenorphine (SUBUTEX) 8 MG SUBL sublingual tablet Place 1 tablet (8 mg) under the tongue 3 times daily Fill /start 8/9/2024.  30 day script. 90 tablet 0    docusate sodium (COLACE) 100 MG capsule Take 2 capsules (200 mg) by mouth 2 times daily 60 capsule 10    FLUoxetine (PROZAC) 40 MG capsule Take 2 capsules (80 mg) by mouth daily 180 capsule 0    folic acid (FOLVITE) 1 MG tablet Take 3 tablets (3,000 mcg) by mouth daily 270 tablet 2    losartan (COZAAR) 25 MG tablet Take 1 tablet (25 mg) by mouth daily 90 tablet 3    medical cannabis (Patient's own supply) See Admin Instructions (The purpose of this order is to document that the patient reports taking medical cannabis.  This is not a prescription, and is not used to certify that the patient has a qualifying medical condition.)     Pills PRN   Lozenges PRN      methotrexate 2.5 MG tablet Take 8 tablets (20 mg) by mouth every 7 days 96 tablet 2    naloxone (NARCAN) 4 MG/0.1ML nasal spray Spray 1 spray (4 mg) into one nostril alternating nostrils as needed for opioid reversal every 2-3 minutes until assistance arrives 0.2 mL 1    ondansetron (ZOFRAN ODT) 4 MG ODT tab Take 1 tablet (4 mg) by mouth every 8 hours as needed for nausea 30 tablet 3    oxyCODONE (ROXICODONE) 5 MG tablet Take 1 tablet (5 mg) by mouth every 6 hours as needed for severe pain Max of 3/day. Fill/begin 7/26/2024 Short term script for acute pain flare. 42 tablet 0    prazosin (MINIPRESS) 2 MG capsule Take 1 capsule (2 mg) by mouth at bedtime 90 capsule 0    predniSONE (DELTASONE) 5 MG tablet Take 3 tabs daily for 1 week, then take 2 tabs daily for 1 week 35 tablet 1    QUEtiapine (SEROQUEL) 100 MG tablet Take 1 tablet (100 mg) by mouth at bedtime 30 tablet 1    QUEtiapine (SEROQUEL) 25 MG tablet Take 1-2 tablets (25-50 mg) by mouth daily as needed (Anxiety) 60 tablet 0    tiZANidine (ZANAFLEX) 4 MG  tablet Take 1 tablet (4 mg) by mouth 3 times daily as needed for muscle spasms 3 month supply 225 tablet 1    tofacitinib (XELJANZ XR) 11 MG 24 hr tablet Take 1 tablet (11 mg) by mouth daily Hold for signs of infection, then seek medical attention. 90 tablet 2       Medical History: any changes in medical history since they were last seen? No    Social History:    Home situation: , has 2 grown children  Occupation/Schooling: currently unemployed, worked as a  (unemployed since 3/1/2017). 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  Uses nicotine gum  If yes, was cessation counseling offered?  None needed        Physical Exam:     Vital signs: There were no vitals taken for this visit.     Behavioral observations:  Awake, alert. Cooperative.   Pulm: respirations easy and unlabored. Able to speak in full sentences without SOB or cough noted.               [place] Prescription Monitoring Program:  Reviewed [place] [facility] 8/20/2024- no concerning fills.  Susana GRANT, RN CNP, FNP  [facility] [place] [facility]  [facility]          DIRE Score for selecting candidates for long term opioid analgesia for chronic pain:  Diagnosis  2  Intractablility  2  Risk    Psychological health  1    Chemical health  2    Reliability  2    Social support  2  Efficacy  1  Total DIRE Score = 12. Note that  7-13 predicts poor outcome (compliance and efficacy) from opioid prescribing; 14-21 predicts good outcome (compliance and efficacy)  from opioid prescribing        Assessment:    Lumbar facet joint pain  Spondylosis of lumbar region without myelopathy or radiculopathy  Degenerative disc disease of the lumbar spine  Ankylosing spondylitis of lumbosacral region  Rheumatoid arthritis involving multiple  joints with positive rheumatoid factor  Multiple joint pain  Muscle spasm  Myofascial pain  Uncomplicated opiate dependence  Chronic continuous use of opioids  Class 2 severe obesity due to excess calories with serious comorbidity and BMI of 35.5-35.9 in adult    Encounter for long-term use of opiate analgesic  UDT 5/20/2024  Signed CSA 5/20/2024  PMHx includes: Panic attacks, back pain, arthritis, anxiety, ankylosing spondylitis  PSHx includes: Right knee surgery ×2, left foot surgery right knee arthroscopy          Plan:    Check out Shmuel Murray online for gentle exercise  Physical Therapy:  Referral to PHYSICAL THERAPY  Check out Shmuel Trevor on YouTube. , easy, gentle exercises 30-90 seconds with excellent technique  Clinical Health Psychologist: continue work with your psychiatrist and therapist  Diagnostic Studies:  None  Medication Management:    continue Subutex 8mg  three times per day starting today. Next script fill 9/6 and start 9/8  Continue medical cannabis  continue tizanidine 2-4mg three times daily as needed for muscle spasms  Further procedures recommended: none  Referral to comprehensive weight management clinic  Reviewed 4 keys to weight loss  Don't drink calories (avoid sugary beverages)  Protein at every meal and snack and try to eat protein first  Brown over white meaning wheat bread, brown rice, whole wheat crackers and tortillas, etc.   Try not to eat out more than 2 times per week  Recommendations to PCP: see above   Follow up:12 weeks in-person/virtual. Please call [phone] to make your follow-up appointment with me.           ASSESSMENT AND PLAN:  (M54.59) Lumbar facet joint pain  (primary encounter diagnosis)  Comment:   Plan: buprenorphine (SUBUTEX) 8 MG SUBL sublingual         tablet, Adult Pain Clinic Follow-Up Order            (S57.164) Spondylosis without myelopathy or radiculopathy, lumbosacral region  Comment:   Plan: buprenorphine (SUBUTEX) 8 MG SUBL  sublingual         tablet, Adult Pain Clinic Follow-Up Order            (M51.36) DDD (degenerative disc disease), lumbar  Comment:   Plan: buprenorphine (SUBUTEX) 8 MG SUBL sublingual         tablet, Adult Pain Clinic Follow-Up Order            (M45.7) Ankylosing spondylitis of lumbosacral region (H)  Comment:   Plan: buprenorphine (SUBUTEX) 8 MG SUBL sublingual         tablet, Adult Pain Clinic Follow-Up Order            (M05.79) Rheumatoid arthritis involving multiple sites with positive rheumatoid factor (H)  Comment:   Plan: buprenorphine (SUBUTEX) 8 MG SUBL sublingual         tablet, Adult Pain Clinic Follow-Up Order            (M25.50) Multiple joint pain  Comment:   Plan: buprenorphine (SUBUTEX) 8 MG SUBL sublingual         tablet, Adult Pain Clinic Follow-Up Order            (M62.838) Muscle spasm  Comment:   Plan: tiZANidine (ZANAFLEX) 4 MG tablet, Adult Pain         Clinic Follow-Up Order            (M79.18) Myofascial pain  Comment:   Plan: tiZANidine (ZANAFLEX) 4 MG tablet, Adult Pain         Clinic Follow-Up Order            (F11.20) Uncomplicated opioid dependence (H)  Comment:   Plan: buprenorphine (SUBUTEX) 8 MG SUBL sublingual         tablet, Adult Pain Clinic Follow-Up Order            (F11.90) Chronic, continuous use of opioids  Comment:   Plan: buprenorphine (SUBUTEX) 8 MG SUBL sublingual         tablet, Adult Pain Clinic Follow-Up Order            (E66.01,  Z68.35) Class 2 severe obesity due to excess calories with serious comorbidity and body mass index (BMI) of 35.0 to 35.9 in adult (H)  Comment:   Plan: Adult Comprehensive Weight Management         Referral, Adult Pain Clinic Follow-Up Order              BILLING TIME DOCUMENTATION:   TOTAL TIME includes:   Time spent preparing to see the patient: 3 minutes (reviewing records and tests)  Time spend face to face with the patient: 29 minutes  Time spent ordering tests, medications, procedures and referrals: 0 minutes  Time spent  Referring and communicating with other healthcare professionals: 0 minutes  Documenting clinical information in Epic: 8 minutes    The total TIME spent on this patient on the day of the appointment was 40 minutes.                          Susana GRANT, RN CNP, FNP  [facility]

## 2024-08-20 ENCOUNTER — VIRTUAL VISIT (OUTPATIENT)
Dept: PALLIATIVE MEDICINE | Facility: CLINIC | Age: 59
End: 2024-08-20
Payer: COMMERCIAL

## 2024-08-20 DIAGNOSIS — M45.7 ANKYLOSING SPONDYLITIS OF LUMBOSACRAL REGION (H): ICD-10-CM

## 2024-08-20 DIAGNOSIS — M47.817 SPONDYLOSIS WITHOUT MYELOPATHY OR RADICULOPATHY, LUMBOSACRAL REGION: ICD-10-CM

## 2024-08-20 DIAGNOSIS — F11.90 CHRONIC, CONTINUOUS USE OF OPIOIDS: ICD-10-CM

## 2024-08-20 DIAGNOSIS — M25.50 MULTIPLE JOINT PAIN: ICD-10-CM

## 2024-08-20 DIAGNOSIS — M54.59 LUMBAR FACET JOINT PAIN: Primary | ICD-10-CM

## 2024-08-20 DIAGNOSIS — Z79.891 ENCOUNTER FOR LONG-TERM USE OF OPIATE ANALGESIC: ICD-10-CM

## 2024-08-20 DIAGNOSIS — M62.838 MUSCLE SPASM: ICD-10-CM

## 2024-08-20 DIAGNOSIS — E66.812 CLASS 2 SEVERE OBESITY DUE TO EXCESS CALORIES WITH SERIOUS COMORBIDITY AND BODY MASS INDEX (BMI) OF 35.0 TO 35.9 IN ADULT (H): ICD-10-CM

## 2024-08-20 DIAGNOSIS — M51.369 DDD (DEGENERATIVE DISC DISEASE), LUMBAR: ICD-10-CM

## 2024-08-20 DIAGNOSIS — M79.18 MYOFASCIAL PAIN: ICD-10-CM

## 2024-08-20 DIAGNOSIS — F11.20 UNCOMPLICATED OPIOID DEPENDENCE (H): ICD-10-CM

## 2024-08-20 DIAGNOSIS — E66.01 CLASS 2 SEVERE OBESITY DUE TO EXCESS CALORIES WITH SERIOUS COMORBIDITY AND BODY MASS INDEX (BMI) OF 35.0 TO 35.9 IN ADULT (H): ICD-10-CM

## 2024-08-20 DIAGNOSIS — M05.79 RHEUMATOID ARTHRITIS INVOLVING MULTIPLE SITES WITH POSITIVE RHEUMATOID FACTOR (H): ICD-10-CM

## 2024-08-20 PROCEDURE — G2211 COMPLEX E/M VISIT ADD ON: HCPCS | Mod: 95 | Performed by: NURSE PRACTITIONER

## 2024-08-20 PROCEDURE — 99215 OFFICE O/P EST HI 40 MIN: CPT | Mod: 95 | Performed by: NURSE PRACTITIONER

## 2024-08-20 RX ORDER — BUPRENORPHINE 8 MG/1
8 TABLET SUBLINGUAL 3 TIMES DAILY
Qty: 90 TABLET | Refills: 0 | Status: SHIPPED | OUTPATIENT
Start: 2024-08-20

## 2024-08-20 ASSESSMENT — PAIN SCALES - GENERAL: PAINLEVEL: EXTREME PAIN (9)

## 2024-08-20 NOTE — PATIENT INSTRUCTIONS
Plan:    Physical Therapy:  Referral to PHYSICAL THERAPY  Check out Shmuel Maldonadoin on YouTube. , easy, gentle exercises 30-90 seconds with excellent technique  Clinical Health Psychologist: continue work with your psychiatrist and therapist  Diagnostic Studies:  None  Medication Management:    continue Subutex 8mg  three times per day starting today. Next script fill 9/6 and start 9/8  Continue medical cannabis  continue tizanidine 2-4mg three times daily as needed for muscle spasms  Further procedures recommended: none  Referral to comprehensive weight management clinic  Reviewed 4 keys to weight loss  Don't drink calories (avoid sugary beverages)  Protein at every meal and snack and try to eat protein first  Brown over white meaning wheat bread, brown rice, whole wheat crackers and tortillas, etc.   Try not to eat out more than 2 times per week  Recommendations to PCP: see above   Follow up:12 weeks in-person/virtual. Please call [phone] to make your follow-up appointment with me.     ----------------------------------------------------------------  Clinic Number:  [phone]   Call with any questions about your care and for scheduling assistance.   Calls are returned Monday through Friday between 8 AM and 4:30 PM. We usually get back to you within 2 business days depending on the issue/request.    If we are prescribing your medications:  For opioid medication refills, call the clinic or send a Sift message 7 days in advance.  Please include:  Name of requested medication  Name of the pharmacy.  For non-opioid medications, call your pharmacy directly to request a refill. Please allow 3-4 days to be processed.   Per [place] State Law:  All controlled substance prescriptions must be filled within 30 days of being written.    For those controlled substances allowing refills, pickup must occur within 30 days of last fill.      We believe regular attendance is key to your success in our program!     Any time you are unable to keep your appointment we ask that you call us at least 24 hours in advance to cancel.This will allow us to offer the appointment time to another patient.   Multiple missed appointments may lead to dismissal from the clinic.

## 2024-08-25 NOTE — PLAN OF CARE
Assessment/Intervention/Current Symptoms and Care Coordination: Met with team. Reviewed patient's chart and progress notes. Writer called the [facility], [place] [phone]. Writer left  requesting a call back.          Discharge Plan or Goal: Will return home. Will continue with the [facility]. Will follow up with outpatient psychiatrist.            Barriers to Discharge: SI. Needs clinical stabilization and medication management.               Referral Status: Will be referred to individual therapy.           Legal Status: Voluntary   Low Sodium Diet unable to speak due to diff breathing

## 2024-08-26 ENCOUNTER — MYC MEDICAL ADVICE (OUTPATIENT)
Dept: PALLIATIVE MEDICINE | Facility: CLINIC | Age: 59
End: 2024-08-26
Payer: COMMERCIAL

## 2024-08-30 ENCOUNTER — VIRTUAL VISIT (OUTPATIENT)
Dept: PSYCHIATRY | Facility: CLINIC | Age: 59
End: 2024-08-30
Payer: COMMERCIAL

## 2024-08-30 VITALS — WEIGHT: 294 LBS | BODY MASS INDEX: 36.26 KG/M2

## 2024-08-30 DIAGNOSIS — F33.1 MAJOR DEPRESSIVE DISORDER, RECURRENT EPISODE, MODERATE (H): ICD-10-CM

## 2024-08-30 DIAGNOSIS — F43.10 PTSD (POST-TRAUMATIC STRESS DISORDER): ICD-10-CM

## 2024-08-30 DIAGNOSIS — G47.00 INSOMNIA, UNSPECIFIED TYPE: Primary | ICD-10-CM

## 2024-08-30 DIAGNOSIS — F41.1 GENERALIZED ANXIETY DISORDER: ICD-10-CM

## 2024-08-30 PROCEDURE — G2211 COMPLEX E/M VISIT ADD ON: HCPCS | Mod: 95

## 2024-08-30 PROCEDURE — 99215 OFFICE O/P EST HI 40 MIN: CPT | Mod: 95

## 2024-08-30 PROCEDURE — 90833 PSYTX W PT W E/M 30 MIN: CPT | Mod: 95

## 2024-08-30 ASSESSMENT — PAIN SCALES - GENERAL: PAINLEVEL: EXTREME PAIN (8)

## 2024-08-30 ASSESSMENT — ANXIETY QUESTIONNAIRES
1. FEELING NERVOUS, ANXIOUS, OR ON EDGE: SEVERAL DAYS
GAD7 TOTAL SCORE: 3
5. BEING SO RESTLESS THAT IT IS HARD TO SIT STILL: NOT AT ALL
6. BECOMING EASILY ANNOYED OR IRRITABLE: NOT AT ALL
4. TROUBLE RELAXING: SEVERAL DAYS
GAD7 TOTAL SCORE: 3
IF YOU CHECKED OFF ANY PROBLEMS ON THIS QUESTIONNAIRE, HOW DIFFICULT HAVE THESE PROBLEMS MADE IT FOR YOU TO DO YOUR WORK, TAKE CARE OF THINGS AT HOME, OR GET ALONG WITH OTHER PEOPLE: SOMEWHAT DIFFICULT
7. FEELING AFRAID AS IF SOMETHING AWFUL MIGHT HAPPEN: NOT AT ALL
2. NOT BEING ABLE TO STOP OR CONTROL WORRYING: NOT AT ALL
GAD7 TOTAL SCORE: 3
8. IF YOU CHECKED OFF ANY PROBLEMS, HOW DIFFICULT HAVE THESE MADE IT FOR YOU TO DO YOUR WORK, TAKE CARE OF THINGS AT HOME, OR GET ALONG WITH OTHER PEOPLE?: SOMEWHAT DIFFICULT
3. WORRYING TOO MUCH ABOUT DIFFERENT THINGS: SEVERAL DAYS
7. FEELING AFRAID AS IF SOMETHING AWFUL MIGHT HAPPEN: NOT AT ALL

## 2024-08-30 ASSESSMENT — PATIENT HEALTH QUESTIONNAIRE - PHQ9
10. IF YOU CHECKED OFF ANY PROBLEMS, HOW DIFFICULT HAVE THESE PROBLEMS MADE IT FOR YOU TO DO YOUR WORK, TAKE CARE OF THINGS AT HOME, OR GET ALONG WITH OTHER PEOPLE: SOMEWHAT DIFFICULT
SUM OF ALL RESPONSES TO PHQ QUESTIONS 1-9: 9
SUM OF ALL RESPONSES TO PHQ QUESTIONS 1-9: 9

## 2024-08-30 NOTE — PROGRESS NOTES
Virtual Visit Details    Type of service:  Video Visit   Video Start Time:  1003  Video End Time:1056    Originating Location (pt. Location): Home    Distant Location (provider location):  Off-site  Platform used for Video Visit: [employer]

## 2024-08-30 NOTE — PATIENT INSTRUCTIONS
Medication Plan  -Decrease Seroquel to 25-50mg at bedtime; can continue 25-50mg once daily as needed for anxiety  -Continue fluoxetine (PROZAC) 80 mg daily  -Continue prazosin (MINIPRESS) 2 mg at bedtime   -Continue melatonin 3 mg 3-4 hours before desired bedtime     **For crisis resources, please see the information at the end of this document**   Patient Education    Thank you for coming to the [facility] [place] [facility].     Lab Testing:  If you had lab testing today and your results are reassuring or normal they will be mailed to you or sent through [employer] within 7 days. If the lab tests need quick action we will call you with the results. The phone number we will call with results is # [phone]. If this is not the best number please call our clinic and change the number.     Medication Refills:  If you need any refills please call your pharmacy and they will contact us. Our fax number for refills is [phone].   Three business days of notice are needed for general medication refill requests.   Five business days of notice are needed for controlled substance refill requests.   If you need to change to a different pharmacy, please contact the new pharmacy directly. The new pharmacy will help you get your medications transferred.     Contact Us:  Please call [phone] during business hours (8-5:00 M-F).   If you have medication related questions after clinic hours, or on the weekend, please call [phone].     Financial Assistance [phone]   Medical Records [phone]       MENTAL HEALTH CRISIS RESOURCES:  For a emergency help, please call 911 or go to the nearest Emergency Department.     Emergency Walk-In Options:   [facility] Unit @ [place] ([place]): [phone] - Specialized mental health emergency area designed to be calming  [facility] ([place]): [phone]  [facility] ([place]):  [phone]  [facility] ([place]): [phone]    [facility] Crisis Information:   [place]: [phone]  [place]: [phone]  Jeevan MARQUES) - Adult: [phone]     Child: [phone]  [place] - Adult: [phone]     Child: [phone]  [place]: [phone]  List of all [facility] resources:   https://[place].[facility]/dhs/people-we-serve/adults/health-care/mental-health/resources/crisis-contacts.jsp    National Crisis Information:   Crisis Text Line: Text  [place]  to 484150  Suicide & Crisis Lifeline: 988  National Suicide Prevention Lifeline: 3-596-293-TALK (1-273.520.2239)       For online chat options, visit https://suicidepreventionlifeline.org/chat/  Poison Control Center: 1-978.524.4843  Trans Lifeline: 2-658-099-3834 - Hotline for transgender people of all ages  [employer]: 8-732-386-5775 - Hotline for LGBT youth     For Non-Emergency Support:   Fast Tracker: Mental Health & Substance Use Disorder Resources -   https://www.[employer].org/

## 2024-08-30 NOTE — NURSING NOTE
Current patient location: [address] [facility] [address]    Is the patient currently in the state of [place]? YES    Visit mode:VIDEO    If the visit is dropped, the patient can be reconnected by: VIDEO VISIT: Text to cell phone:   Telephone Information:   Mobile [phone]       Will anyone else be joining the visit? NO  (If patient encounters technical issues they should call [phone])    How would you like to obtain your AVS? MyChart    Are changes needed to the allergy or medication list? No    Are refills needed on medications prescribed by this physician? NO    Rooming Documentation:  Patient will complete questionnaire(s) in MyChart      Reason for visit: Video Visit (Consult)    Aileen LIU
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
## 2024-09-06 NOTE — TELEPHONE ENCOUNTER
Information noted. Agree with recommendation to be seen for acute on chronic pain from a fall.     Susana GRANT RN CNP, FNP  [facility]  [facility]

## 2024-09-23 ENCOUNTER — MYC MEDICAL ADVICE (OUTPATIENT)
Dept: FAMILY MEDICINE | Facility: CLINIC | Age: 59
End: 2024-09-23
Payer: COMMERCIAL

## 2024-09-23 ENCOUNTER — MYC REFILL (OUTPATIENT)
Dept: FAMILY MEDICINE | Facility: CLINIC | Age: 59
End: 2024-09-23
Payer: COMMERCIAL

## 2024-09-23 DIAGNOSIS — I10 ESSENTIAL HYPERTENSION WITH GOAL BLOOD PRESSURE LESS THAN 140/90: ICD-10-CM

## 2024-09-23 DIAGNOSIS — E78.5 DYSLIPIDEMIA: ICD-10-CM

## 2024-09-23 RX ORDER — LOSARTAN POTASSIUM 25 MG/1
25 TABLET ORAL DAILY
Qty: 90 TABLET | Refills: 3 | OUTPATIENT
Start: 2024-09-23

## 2024-09-24 RX ORDER — ATORVASTATIN CALCIUM 20 MG/1
20 TABLET, FILM COATED ORAL DAILY
Qty: 90 TABLET | Refills: 2 | Status: SHIPPED | OUTPATIENT
Start: 2024-09-24

## 2024-10-03 NOTE — PROGRESS NOTES
Jamison is a 58 year old who is being evaluated via a billable video visit.    How would you like to obtain your AVS? MyChart  If the video visit is dropped, the invitation should be resent by: Text to cell phone: [phone]  Will anyone else be joining your video visit? No    Video-Visit Details    Type of service:  Video Visit   Video End Time:7:30 AM  Originating Location (pt. Location): Home    Distant Location (provider location):  On-site  Platform used for Video Visit: [facility]      Rheumatology Clinic Visit  [facility]  Juan Novak M.D.     Jailene Breen MRN# [id number]   YOB: 1965 Age: 58 year old   Date of Visit: 10/04/2024  Primary care provider: Minda Monzon          Assessment and Plan:     # Seropositive rheumatoid arthritis (RF+, CCP+)  # Osteoarthritis, hands    Patient relates stable symmetric polyarthralgia and stiffness.  There is morning accentuation of hand and foot pain; however repetitive use associated pain in the hands including the DIPs is also reported.  Video exam shows no gross swelling or asymmetry in MCPs, PIPs, wrists.  Range of motion is wrists, elbows, shoulders is preserved.    Data: On August 2, 2024, comprehensive metabolic panel was normal; CRP was less than 3; CBC normal; sed rate 14.    In 2016, rheumatoid factor elevated at 78 international units; cyclic citrullinated peptide antibodies greater than 340.    Imaging: MRI of the brain on March 22, 2024 showed scattered white matter T2 hyperintensities.    Discussion: Seropositive rheumatoid arthritis is reasonably controlled.  Noninflammatory, mechanical symptoms are still present, largely in the PIPs and DIPs of the hands.  I think that high risk inflammatory arthritis is responding to immunomodulatory therapy.  Patient remains concerned about ongoing aches and pains requiring Tylenol and ibuprofen.  I recommend further diagnostic work, including checking inflammatory markers and x-rays of the  hands.  Therapeutically, I recommend continuing combination Xeljanz and methotrexate, and monitoring appropriately for renal function, liver function, blood counts, and intermittent testing for lipids.  I explained to the patient that an in person examination of the joints will be important for guiding future therapy, and deciding whether alternative treatment for RA would be appropriate.    Diagnosis:  1.  Seropositive rheumatoid arthritis: Joint pain is stable with use of methotrexate and Xeljanz.  I recommend continued combination Xeljanz and methotrexate.  2. Osteoarthritis: hands; much pain that occurs with frequent repetitive use of the hands is likely due to osteoarthritis.    Plan:  1.  Continue tofacitinib 11 mg long-acting, once daily  2.  Continue methotrexate 8 tablets (20 mg) once weekly.While on methotrexate:   -- Check blood tests every 3 months (AST/ALT, Albumin, CBC with platelets)   -- Limit alcohol intake to 2 drinks weekly; use folate 3 mg daily.  --Tylenol 500-1000 mg can be used as needed up to three times daily for nausea/headache associated with dosing.    3.  For residual pain, use ibuprofen 600 mg every 8 hours with food for 7 to 10 days, then resume as needed use, interspersed with acetaminophen 1000 mg 3 times daily for joint pain.  4.  Hold prednisone except for 1 or 2 tabs once monthly, due to increased anxiety/panic symptoms.  5.  Make a trial of paraffin wax bath in order to provide comforting and pain reducing heat to the joints of the fingers and knuckles.  May use paraffin bath as many times as is needed, daily.  6.  X-rays of the hands to assess possible changes of rheumatoid arthritis since 2020.    RTC 6 months    Juan Novak MD  Staff Rheumatologist [facility]    On the day of the encounter, a total of 32 minutes was spent in chart review, and in counseling and coordination of care, regarding the patient's complex medical problem of seropositive rheumatoid arthritis, high  "risk medication.    The longitudinal plan of care for the diagnosis(es)/condition(s) as documented were addressed during this visit. Due to the added complexity in care, I will continue to support Les in the subsequent management and with ongoing continuity of care.    No orders of the defined types were placed in this encounter.    Orders Placed This Encounter   Procedures    XR Hand Bilateral 1 View            History of Present Illness:   Jailene Breen presents for follow-up of rheumatoid arthritis.  HE Has past medical history of depression, anxiety, obesity, seropositive rheumatoid arthritis.  He was last seen by video on 8-5-24.  At that visit, rheumatoid arthritis was under improving control. Recommendation was to continue tofacitinib 11 mg daily, and methotrexate 20 mg  weekly.    RA background: He was diagnosed with seropositive rheumatoid arthritis in 2016, established care with Dr. Raygoza in 5/2016 and was started on methotrexate.  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 He was started on Humira in 12/2016 due to AS.  Discontinued methotrexate in 2/2017 due to lack of improvement. Humira was changed to Enbrel in 4/2019 due to lack of improvement.  Methotrexate was restarted in July 2020.  Etanercept added by Dr. Parada in 2021; stopped due to lack of efficacy in December 2023.  Tofacitinib started in early 2024.  Tofacitinib and methotrexate combination continued October 2024.    Interval history October 7, 2024    Joints still hurt/are sore. Fingers and toes predominant.    Seems like \"tofa does not work as well as the injectables\". Pain is most noticeable in the PIPs and DIPs. He recalls more ups and downs with pain when he was taking enbrel.Fingers look \"plump\", but there is no warmth or redness.  He does associate more pain with work on a " "construction project in recent months.  He does note \"flareups\" that he attributes to RA, once a month with red/puffy fingers.    Hand joint pain is worse in the morning but it is more noticeable after a day's activities.  Early morning stiffness is about 1 hour.    Taking xeljanz 1 tab daily.  Taking methotrexate 8 tabs weekly. No clear AE, but he has issues with constipation. Using docusate.  If joint pain is bad, he takes advil, every couple months.  Taking 4-6 tylenol   Also takes advil 2-6 tabs per day.     He takes prednisone \"small amounts\" once monthly.      Initial history August 5, 2024:    He was doing \"ok\" until he ran out of tofacitinib. He went without medication for a week in late July.  He rapidly developed worsening pain in shoulders, elbows, fingers, and ankles with increased stiffness lasting for more than 1 hour in the mornings.      He has noted \"flares\" of joint pain in recent months with days of stiffness, inability to move it freely, and throbbing pain in hands and feet.  Joint pain, especially shoulders, elbows, fingers, ankles have shooting characteristics.  No visible swelling. Pain is bilateral, symmetrical, + morning accentuation of joint pain, but he has trouble falling asleep as well. He restarted tofacitinib 2 days ago. Pain is improving since restart of xeljanz, and since he increased prednisone slightly to 10 mg daily.    He definitely thinks that xeljanz and methotrexate are beneficial. No AE  Takes methotrexate 8 tabs once a week.  Taking prednisone 5 mg daily; he finds that higher doses increases anxiety.   Takes ibu and acetaminophen, alternating, in the mornings, and at night.    He has noted some constipation recently; \"stools are hard\". Primary provider recently increased docusate.  He is active, walks the dog daily. He does note low exercise tolerance with excess sweating.    Interim History, Dr. Parada December 1, 2023: In the last couple months he had flare up, it " starts in his hands, ankles and then in his shoulders. Can not lift his arm up. Both the shoulders were hurting bad. He is having diarrhea for last couple months. He has started a new antidepressant. He is on Enbrel but has not taken Enbrel for a month due to diarrhea issues.  He thinks that Enbrel helps with his joint pains.  He is on methotrexate 4 tablets once a week but cannot tell if it helps him or not.  He has prednisone tablets and take 20 mg once a month when his symptoms worsens.  He does not like to take prednisone since his anxiety symptoms get worse.     He has lumbar degenerative disc disease and gets MAKAYLA.  He has noticed urinary retention issues sporadically.           Review of Systems:     Constitutional: negative  Skin: negative  Eyes: negative  Ears/Nose/Throat: negative  Respiratory: No shortness of breath, dyspnea on exertion, cough, or hemoptysis  Cardiovascular: negative  Gastrointestinal: negative  Genitourinary: negative  Musculoskeletal: negative  Neurologic: negative  Psychiatric: negative  Hematologic/Lymphatic/Immunologic: negative  Endocrine: negative         Active Problem List:     Patient Active Problem List    Diagnosis Date Noted    Chronic, continuous use of opioids 03/01/2024     Priority: Medium    Morbid obesity (H) 12/08/2022     Priority: Medium    Encounter for screening colonoscopy 03/25/2022     Priority: Medium    Major depressive disorder, recurrent episode, severe with mixed features (H) 04/01/2021     Priority: Medium    Suicidal ideation 03/16/2021     Priority: Medium    Immunocompromised (H) 01/13/2021     Priority: Medium    Moderate major depression (H) 10/06/2020     Priority: Medium    Ankylosing spondylitis (H) 03/01/2017     Priority: Medium    Narcotic abuse, continuous (H) 02/20/2017     Priority: Medium     Formatting of this note might be different from the original.   was checked 2/20/17. 6 different controlled substances filled in February 2017 by 6  different providers plus fills in numerous cities over the last year but many providers. Should not be given any narcotics or benzodiazepines.      Generalized anxiety disorder 10/31/2016     Priority: Medium    Panic attack 10/31/2016     Priority: Medium    Right-sided thoracic back pain, unspecified chronicity 10/10/2016     Priority: Medium    Tear meniscus knee, right, initial encounter 09/15/2016     Priority: Medium    Rheumatoid arthritis involving multiple sites, unspecified rheumatoid factor presence 09/15/2016     Priority: Medium     IMO Regulatory Load OCT 2020      Nonintractable migraine 01/25/2016     Priority: Medium    Essential hypertension with goal blood pressure less than 140/90 11/21/2013     Priority: Medium    Lumbar radiculopathy 11/21/2013     Priority: Medium    Chronic back pain greater than 3 months duration 11/11/2013     Priority: Medium    CARDIOVASCULAR SCREENING; LDL GOAL LESS THAN 160 03/06/2012     Priority: Medium    Obesity, Class I, BMI 30-34.9 03/06/2012     Priority: Medium    Overweight 03/06/2012     Priority: Medium    Drug dependence (H) 02/13/2012     Priority: Medium    Chronic pain 09/04/2011     Priority: Medium    Plantar fascial fibromatosis 04/23/2007     Priority: Medium     Formatting of this note might be different from the original.  Overview:   left  Formatting of this note might be different from the original.  left      Sprain of sacroiliac region 12/14/2006     Priority: Medium    Hyperlipidemia 09/01/2006     Priority: Medium    Contact dermatitis and eczema 11/24/2004     Priority: Medium    Dermatitis 11/24/2004     Priority: Medium    Esophageal reflux 11/24/2004     Priority: Medium    Hematuria 11/24/2004     Priority: Medium            Past Medical History:     Past Medical History:   Diagnosis Date    Ankylosing spondylitis (H) 03/01/2017    Anxiety     Arthritis     back, knees    Back pain 11/17/2013    possible drug seeking behavior in the past.     Gastroesophageal reflux disease     Hypertension     Overweight (BMI 25.0-29.9) 03/06/2012    Panic attacks     Syncope, unspecified syncope type 02/27/2017    Tobacco use disorder 6/19/2017     Past Surgical History:   Procedure Laterality Date    ARTHROSCOPY KNEE Right 09/22/2016    Procedure: ARTHROSCOPY KNEE;  Surgeon: Fredo Hughes MD;  Location: [facility] OR    COMBINED ESOPHAGOSCOPY, GASTROSCOPY, DUODENOSCOPY (EGD) WITH CO2 INSUFFLATION N/A 01/19/2021    Procedure: ESOPHAGOGASTRODUODENOSCOPY, WITH CO2 INSUFFLATION;  Surgeon: Brandon Mack MD;  Location: [facility] OR    ESOPHAGOSCOPY, GASTROSCOPY, DUODENOSCOPY (EGD), COMBINED N/A 01/19/2021    Procedure: Esophagogastroduodenoscopy, With Biopsy;  Surgeon: Brandon Mack MD;  Location: [facility] OR    ESOPHAGOSCOPY, GASTROSCOPY, DUODENOSCOPY (EGD), COMBINED N/A 05/27/2021    Procedure: ESOPHAGOGASTRODUODENOSCOPY (EGD);  Surgeon: Chester Lynn MD;  Location: [facility]    INJECT PARAVERTEBRAL FACET JOINT LUMBAR / SACRAL FIRST Right 11/25/2016    Procedure: INJECT PARAVERTEBRAL FACET JOINT LUMBAR / SACRAL FIRST;  Surgeon: Doretha Magallanes MD;  Location: [facility] OR    ORTHOPEDIC SURGERY Right     knee    PLANTAR FASCIA RELEASE Left             Social History:     Social History     Socioeconomic History    Marital status:      Spouse name: Not on file    Number of children: 2    Years of education: Not on file    Highest education level: Not on file   Occupational History    Not on file   Tobacco Use    Smoking status: Never     Passive exposure: Never    Smokeless tobacco: Former     Types: Chew     Quit date: 12/12/2019    Tobacco comments:     still chews nicotine gum   Vaping Use    Vaping status: Never Used   Substance and Sexual Activity    Alcohol use: No     Comment: none    Drug use: Yes     Frequency: 7.0 times per week     Types: Marijuana     Comment: medicinal (oral)    Sexual activity: Yes     Partners: Female     Comment: decreased  with Prozac   Other Topics Concern    Parent/sibling w/ CABG, MI or angioplasty before 65F 55M? Not Asked   Social History Narrative    Not on file     Social Determinants of Health     Financial Resource Strain: Low Risk  (3/1/2024)    Financial Resource Strain     Within the past 12 months, have you or your family members you live with been unable to get utilities (heat, electricity) when it was really needed?: No   Food Insecurity: High Risk (3/1/2024)    Food Insecurity     Within the past 12 months, did you worry that your food would run out before you got money to buy more?: Yes     Within the past 12 months, did the food you bought just not last and you didn t have money to get more?: No   Transportation Needs: High Risk (3/1/2024)    Transportation Needs     Within the past 12 months, has lack of transportation kept you from medical appointments, getting your medicines, non-medical meetings or appointments, work, or from getting things that you need?: Yes   Physical Activity: Insufficiently Active (3/1/2024)    Exercise Vital Sign     Days of Exercise per Week: 3 days     Minutes of Exercise per Session: 20 min   Stress: Stress Concern Present (3/1/2024)    [employer] [place] [employer] - Occupational Stress Questionnaire     Feeling of Stress : Very much   Social Connections: Unknown (3/1/2024)    Social Connection and Isolation Panel [NHANES]     Frequency of Communication with Friends and Family: Not on file     Frequency of Social Gatherings with Friends and Family: Never     Attends Tenriism Services: Not on file     Active Member of Clubs or Organizations: Not on file     Attends Club or Organization Meetings: Not on file     Marital Status: Not on file   Interpersonal Safety: Low Risk  (3/1/2024)    Interpersonal Safety     Do you feel physically and emotionally safe where you currently live?: Yes     Within the past 12 months, have you been hit, slapped, kicked or otherwise physically  "hurt by someone?: No     Within the past 12 months, have you been humiliated or emotionally abused in other ways by your partner or ex-partner?: No   Housing Stability: High Risk (3/1/2024)    Housing Stability     Do you have housing? : Yes     Are you worried about losing your housing?: Yes          Family History:     Family History   Problem Relation Age of Onset    Hypertension Mother     Diabetes Mother     Depression Mother     Mental Illness Mother     Coronary Stenting Father 62        possible MI    Lung Cancer Paternal Uncle     Substance Abuse Brother     Substance Abuse Brother     Autoimmune Disease No family hx of     Anesthesia Reaction No family hx of     Thrombophilia No family hx of     Bleeding Disorder No family hx of             Allergies:     Allergies   Allergen Reactions    Mirtazapine      Other reaction(s): Coma    Hydrocodone Itching    Mirtazapine Other (See Comments)     \"Blacked out\" for one week    Ms Contin [Morphine] Itching            Medications:     Current Outpatient Medications   Medication Sig Dispense Refill    atorvastatin (LIPITOR) 20 MG tablet Take 1 tablet (20 mg) by mouth daily. 90 tablet 2    buprenorphine (SUBUTEX) 8 MG SUBL sublingual tablet Place 1 tablet (8 mg) under the tongue 3 times daily Fill  9/6/2024 start 9/8/2024.  30 day script. 90 tablet 0    docusate sodium (COLACE) 100 MG capsule Take 2 capsules (200 mg) by mouth 2 times daily 60 capsule 10    FLUoxetine (PROZAC) 40 MG capsule Take 2 capsules (80 mg) by mouth daily 180 capsule 0    folic acid (FOLVITE) 1 MG tablet Take 3 tablets (3,000 mcg) by mouth daily 270 tablet 2    losartan (COZAAR) 25 MG tablet Take 1 tablet (25 mg) by mouth daily 90 tablet 3    medical cannabis (Patient's own supply) See Admin Instructions (The purpose of this order is to document that the patient reports taking medical cannabis.  This is not a prescription, and is not used to certify that the patient has a qualifying medical " condition.)     Pills PRN   Lozenges PRN      methotrexate 2.5 MG tablet Take 8 tablets (20 mg) by mouth every 7 days 96 tablet 2    naloxone (NARCAN) 4 MG/0.1ML nasal spray Spray 1 spray (4 mg) into one nostril alternating nostrils as needed for opioid reversal every 2-3 minutes until assistance arrives 0.2 mL 1    ondansetron (ZOFRAN ODT) 4 MG ODT tab Take 1 tablet (4 mg) by mouth every 8 hours as needed for nausea 30 tablet 3    oxyCODONE (ROXICODONE) 5 MG tablet Take 1 tablet (5 mg) by mouth every 6 hours as needed for severe pain Max of 3/day. Fill/begin 7/26/2024 Short term script for acute pain flare. 42 tablet 0    prazosin (MINIPRESS) 2 MG capsule Take 1 capsule (2 mg) by mouth at bedtime 90 capsule 0    predniSONE (DELTASONE) 5 MG tablet Take 3 tabs daily for 1 week, then take 2 tabs daily for 1 week 35 tablet 1    QUEtiapine (SEROQUEL) 100 MG tablet Take 1 tablet (100 mg) by mouth at bedtime 30 tablet 1    QUEtiapine (SEROQUEL) 25 MG tablet Take 1-2 tablets (25-50 mg) by mouth daily as needed (Anxiety) 60 tablet 0    tiZANidine (ZANAFLEX) 4 MG tablet Take 1 tablet (4 mg) by mouth 3 times daily as needed for muscle spasms 3 month supply 225 tablet 1    tofacitinib (XELJANZ XR) 11 MG 24 hr tablet Take 1 tablet (11 mg) by mouth daily Hold for signs of infection, then seek medical attention. 90 tablet 2            Physical Exam:   There were no vitals taken for this visit.  Wt Readings from Last 6 Encounters:   08/02/24 132 kg (291 lb)   06/10/24 128.5 kg (283 lb 3.2 oz)   03/01/24 128.8 kg (284 lb)   12/08/22 131.5 kg (290 lb)   05/02/22 122.7 kg (270 lb 8 oz)   03/16/22 125.3 kg (276 lb 3.2 oz)     There is no height or weight on file to calculate BMI.  Constitutional: well-developed, appearing stated age; cooperative  Eyes: nl EOM, PERRLA, vision, conjunctiva, sclera  ENT: nl external ears, nose, hearing, lips, teeth, gums  Neck: no mass or thyroid enlargement  Resp: Breathing is unlabored  MS: PIPs, MCPs,  wrists, elbows, and shoulders show full unrestricted range of motion.  Patient is able to talk fingertips deeply into the palm on both sides.  No visible MCP or PIP swelling today.  Neuro: nl cranial nerves, strength, sensation, DTRs.   Psych: nl judgement, orientation, memory, affect.         Data:     @      Latest Ref Rng & Units 3/1/2024    10:16 AM 6/10/2024     1:02 PM 8/2/2024    11:25 AM   RHEUM RESULTS   Albumin 3.5 - 5.2 g/dL 4.8   4.2    ALT 0 - 70 U/L 29   31    AST 0 - 45 U/L 29   32    Creatinine 0.67 - 1.17 mg/dL 1.02   1.03    CRP Inflammation <5.00 mg/L   <3.00    GFR Estimate >60 mL/min/1.73m2 86   85    Hematocrit 40.0 - 53.0 %  43.3  44.1    Hemoglobin 13.3 - 17.7 g/dL  14.3  14.7    WBC 4.0 - 11.0 10e3/uL  8.0  8.6    RBC Count 4.40 - 5.90 10e6/uL  4.77  4.91    RDW 10.0 - 15.0 %  13.4  13.3    MCHC 31.5 - 36.5 g/dL  33.0  33.3    MCV 78 - 100 fL  91  90    Platelet Count 150 - 450 10e3/uL  255  256    Sed Rate 0 - 20 mm/hr   14        Rheumatoid Factor   Date Value Ref Range Status   04/21/2016 78 (H) <20 IU/mL Final   ,   Cyclic Citrullinated Peptide Antibody, IgG   Date Value Ref Range Status   04/21/2016 >340 (H) <7 U/mL Final   ,  ,  ,  ,  ,  ,   LEOBARDO Screen by EIA   Date Value Ref Range Status   04/21/2016 <1.0  Interpretation:  Negative   <1.0 Final   ,  ,  ,  ,  ,  ,  ,   Hepatitis B Core Skye   Date Value Ref Range Status   07/13/2020 Nonreactive NR^Nonreactive Final     Hepatitis B Core Antibody Total   Date Value Ref Range Status   05/17/2023 Nonreactive Nonreactive Final   ,   Hep B Surface Agn   Date Value Ref Range Status   07/13/2020 Nonreactive NR^Nonreactive Final     Hepatitis B Surface Antigen   Date Value Ref Range Status   05/17/2023 Nonreactive Nonreactive Final   ,  ,  ,  ,   Quantiferon-TB Gold Plus   Date Value Ref Range Status   05/17/2023 Negative Negative Final     Comment:     No interferon gamma response to M.tuberculosis antigens was detected. Infection with  M.tuberculosis is unlikely, however a single negative result does not exclude infection. In patients at high risk for infection, a second test should be considered in accordance with the 2017 ATS/IDSA/CDC Clinical Pract  ice Guidelines for Diagnosis of Tuberculosis in Adults and Children      TB1 Ag minus Nil Value   Date Value Ref Range Status   05/17/2023 0.00 IU/mL Final     TB2 Ag minus Nil Value   Date Value Ref Range Status   05/17/2023 0.00 IU/mL Final     Mitogen minus Nil Result   Date Value Ref Range Status   05/17/2023 9.95 IU/mL Final     Nil Result   Date Value Ref Range Status   05/17/2023 0.05 IU/mL Final   ,  ,  ,  ,  ,  ,  ,  ,  ,  ,  ,  ,  ,  ,  ,  ,  ,  ,  ,  ,  ,  ,  ,  ,  ,

## 2024-10-07 ENCOUNTER — VIRTUAL VISIT (OUTPATIENT)
Dept: RHEUMATOLOGY | Facility: CLINIC | Age: 59
End: 2024-10-07
Payer: COMMERCIAL

## 2024-10-07 DIAGNOSIS — F32.A DEPRESSION, UNSPECIFIED DEPRESSION TYPE: ICD-10-CM

## 2024-10-07 DIAGNOSIS — M45.8 ANKYLOSING SPONDYLITIS OF SACRAL REGION (H): ICD-10-CM

## 2024-10-07 DIAGNOSIS — M06.9 RHEUMATOID ARTHRITIS INVOLVING MULTIPLE SITES, UNSPECIFIED WHETHER RHEUMATOID FACTOR PRESENT (H): ICD-10-CM

## 2024-10-07 PROCEDURE — G2211 COMPLEX E/M VISIT ADD ON: HCPCS | Mod: 95 | Performed by: INTERNAL MEDICINE

## 2024-10-07 PROCEDURE — 99214 OFFICE O/P EST MOD 30 MIN: CPT | Mod: 95 | Performed by: INTERNAL MEDICINE

## 2024-10-07 RX ORDER — TOFACITINIB 11 MG/1
11 TABLET, FILM COATED, EXTENDED RELEASE ORAL DAILY
Qty: 90 TABLET | Refills: 2 | Status: SHIPPED | OUTPATIENT
Start: 2024-10-07

## 2024-10-07 RX ORDER — FOLIC ACID 1 MG/1
3000 TABLET ORAL DAILY
Qty: 270 TABLET | Refills: 2 | Status: SHIPPED | OUTPATIENT
Start: 2024-10-07

## 2024-10-07 RX ORDER — PREDNISONE 5 MG/1
TABLET ORAL
Qty: 35 TABLET | Refills: 1 | Status: SHIPPED | OUTPATIENT
Start: 2024-10-07

## 2024-10-07 RX ORDER — METHOTREXATE 2.5 MG/1
20 TABLET ORAL
Qty: 96 TABLET | Refills: 3 | Status: SHIPPED | OUTPATIENT
Start: 2024-10-07

## 2024-10-07 NOTE — PATIENT INSTRUCTIONS
Diagnosis:  1.  Seropositive rheumatoid arthritis: Joint pain is improved after restart of tofacitinib (Xeljanz) 2 days ago.  I recommend continued combination Xeljanz and methotrexate.  2. Osteoarthritis: hands    Plan:  1.  Continue tofacitinib 11 mg long-acting, once daily  2.  Continue methotrexate 8 tablets (20 mg) once weekly.While on methotrexate:   -- Check blood tests every 3 months (AST/ALT, Albumin, CBC with platelets)   -- Limit alcohol intake to 2 drinks weekly; use folate 3 mg daily.  --Tylenol 500-1000 mg can be used as needed up to three times daily for nausea/headache associated with dosing.    3.  For residual pain, use ibuprofen 600 mg every 8 hours with food for 7 to 10 days, then resume as needed use, interspersed with acetaminophen 1000 mg 3 times daily for joint pain.  4.  Hold prednisone except for 1 or 2 tabs once monthly, due to increased anxiety/panic symptoms.  5.  Make a trial of paraffin wax bath in order to provide comforting and pain reducing heat to the joints of the fingers and knuckles.  May use paraffin bath as many times as is needed, daily.  6.  X-rays of the hands to assess possible changes of rheumatoid arthritis since 2020.

## 2024-10-07 NOTE — TELEPHONE ENCOUNTER
Mychart message from patient on 4/12  at 1513:    7.5 strips; I am taking 1/2 film every 8hours.  ----------  Based on supply listed on 4/12, with current instructions, this should last until 4/27.     Will have Susana Pike CNP review to determine next steps.     KERLINE ParkerN, RN-BC  Patient Care Supervisor  [facility] [place]     patient a 25-year-old female history of asthma who presents emergency department for 1 week of epigastric pain, cough, chills.  Patient 1 week ago was diagnosed with pneumonia, completed dose of Augmentin and azithromycin.  Augmentin was completed yesterday.  Patient states she is again having a symptoms today.  Pressure-like sensation in her chest and back as well as epigastrium.  Associate with nausea without vomiting.  Denies diarrhea, constipation, melena, hematochezia.    reviewed CXR from last week. no PNA.    will obtain labs, imaging, medicate and reeval 03-Sep-2021 14:30

## 2024-10-08 ENCOUNTER — MYC MEDICAL ADVICE (OUTPATIENT)
Dept: PALLIATIVE MEDICINE | Facility: CLINIC | Age: 59
End: 2024-10-08
Payer: COMMERCIAL

## 2024-10-08 DIAGNOSIS — M25.50 MULTIPLE JOINT PAIN: ICD-10-CM

## 2024-10-08 DIAGNOSIS — M54.59 LUMBAR FACET JOINT PAIN: ICD-10-CM

## 2024-10-08 DIAGNOSIS — M51.360 DEGENERATION OF INTERVERTEBRAL DISC OF LUMBAR REGION WITH DISCOGENIC BACK PAIN: Primary | ICD-10-CM

## 2024-10-08 DIAGNOSIS — M51.369 DDD (DEGENERATIVE DISC DISEASE), LUMBAR: ICD-10-CM

## 2024-10-08 DIAGNOSIS — M05.79 RHEUMATOID ARTHRITIS INVOLVING MULTIPLE SITES WITH POSITIVE RHEUMATOID FACTOR (H): ICD-10-CM

## 2024-10-08 DIAGNOSIS — F11.20 UNCOMPLICATED OPIOID DEPENDENCE (H): ICD-10-CM

## 2024-10-08 DIAGNOSIS — M45.7 ANKYLOSING SPONDYLITIS OF LUMBOSACRAL REGION (H): ICD-10-CM

## 2024-10-08 DIAGNOSIS — M47.817 SPONDYLOSIS WITHOUT MYELOPATHY OR RADICULOPATHY, LUMBOSACRAL REGION: ICD-10-CM

## 2024-10-08 DIAGNOSIS — F11.90 CHRONIC, CONTINUOUS USE OF OPIOIDS: ICD-10-CM

## 2024-10-09 RX ORDER — BUPRENORPHINE 8 MG/1
8 TABLET SUBLINGUAL 3 TIMES DAILY
Qty: 90 TABLET | Refills: 0 | Status: SHIPPED | OUTPATIENT
Start: 2024-10-09 | End: 2024-11-11

## 2024-10-09 NOTE — TELEPHONE ENCOUNTER
Received call from patient requesting refill(s) of     buprenorphine    Last dispensed from pharmacy on 09.09.2024    Patient's last office/virtual visit by prescribing provider on 08.20.2024    Next office/virtual appointment scheduled for 11.18.2024    UDT/CSA 05.20.2024    [facility]     [id number] - [place], [place] - [address] AT [facility],    Will route to nursing [place] for review and preparation of prescription(s).

## 2024-10-09 NOTE — TELEPHONE ENCOUNTER
Signed Prescriptions:                        Disp   Refills    buprenorphine (SUBUTEX) 8 MG SUBL sublingu*90 tab*0        Sig: Place 1 tablet (8 mg) under the tongue 3 times daily.           Fill/start 10/09/24.  30 day script.  Authorizing Provider: SUSANA PETTY        Reviewed [place]  October 9, 2024- no concerning fills.    Susana GRANT, RN CNP, FNP  [facility]

## 2024-10-09 NOTE — TELEPHONE ENCOUNTER
Please process a refill of subutex to     [facility][id number] - [place], [place] - [address] [facility] [address] [facility]  [address] [place] [address]  Phone: [phone] Fax: [phone]

## 2024-10-09 NOTE — TELEPHONE ENCOUNTER
Medication refill information reviewed.     Due date for Subutex 8 MG  is 10/09/24     Prescriptions prepped for review.     Will route to provider.

## 2024-10-10 ENCOUNTER — MYC REFILL (OUTPATIENT)
Dept: PSYCHIATRY | Facility: CLINIC | Age: 59
End: 2024-10-10
Payer: COMMERCIAL

## 2024-10-10 DIAGNOSIS — F33.1 MAJOR DEPRESSIVE DISORDER, RECURRENT EPISODE, MODERATE (H): ICD-10-CM

## 2024-10-10 DIAGNOSIS — F41.0 GENERALIZED ANXIETY DISORDER WITH PANIC ATTACKS: ICD-10-CM

## 2024-10-10 DIAGNOSIS — F41.1 GENERALIZED ANXIETY DISORDER WITH PANIC ATTACKS: ICD-10-CM

## 2024-10-10 RX ORDER — FLUOXETINE 40 MG/1
80 CAPSULE ORAL DAILY
Qty: 180 CAPSULE | Refills: 0 | Status: SHIPPED | OUTPATIENT
Start: 2024-10-10

## 2024-10-10 NOTE — TELEPHONE ENCOUNTER
Last seen: 8/30  RTC: 10/4/24 at 10a  Cancel: None - Provider initiated  No-show: None  Next appt: 10/21       Medication requested:   Pending Prescriptions:                       Disp   Refills    FLUoxetine (PROZAC) 40 MG capsule         180 ca*0            Sig: Take 2 capsules (80 mg) by mouth daily.        From chart note:   1) PSYCHOTROPIC MEDICATION RECOMMENDATIONS: (refills not needed at this time per patient)  -Decrease Seroquel to 25-50mg at bedtime; can continue 25-50mg once daily as needed for anxiety  -Continue fluoxetine (PROZAC) 80 mg daily  -Continue prazosin (MINIPRESS) 2 mg at bedtime   -Continue melatonin 3 mg 3-4 hours before desired bedtime      Per Pain Clinic:  -buprenorphine 8mg TID  -Oxycodone - takes once every couple months; not very helpful.   -tizanidine   -Medical cannabis     Per Rheumatology:  -Methotrexate   -Xeljanz      Refills sent to RN for final review

## 2024-10-10 NOTE — TELEPHONE ENCOUNTER
From chart note:   -Continue fluoxetine (PROZAC) 80 mg daily       Medication unable to be refilled by RN due to criteria not met as indicated.                 []Eligibility - not seen in the last year              []Supervision - no future appointment              []Compliance - no shows, cancellations or lapse in therapy              []Verification - order discrepancy              []Controlled medication              []Medication not included in policy              [x]90-day supply request              []Other:

## 2024-10-11 RX ORDER — FLUOXETINE 40 MG/1
80 CAPSULE ORAL DAILY
Qty: 180 CAPSULE | Refills: 0 | OUTPATIENT
Start: 2024-10-11

## 2024-10-11 NOTE — TELEPHONE ENCOUNTER
Disp Refills Start End KEYANA   FLUoxetine (PROZAC) 40 MG capsule 180 capsule 0 10/10/2024 -- No   Sig - Route: Take 2 capsules (80 mg) by mouth daily. - Oral     [id number] - [place] - [address] AT [facility]

## 2024-10-14 ENCOUNTER — MYC MEDICAL ADVICE (OUTPATIENT)
Dept: PALLIATIVE MEDICINE | Facility: CLINIC | Age: 59
End: 2024-10-14
Payer: COMMERCIAL

## 2024-10-15 NOTE — TELEPHONE ENCOUNTER
See [employer] Mobile message sent to patient.     Susana GRANT RN CNP, FNP  [facility]

## 2024-10-20 NOTE — PROGRESS NOTES
[facility]  MEDICAL PROGRESS NOTE     Jailene Breen is a 59 year old is transferring care from the resident clinic where he was most recently followed by Dr. Hernández.     Initial consultation on 08/30/24.   CARE TEAM:   PCP- Minda Monzon  Therapist- None  Pain- Susana [facility]  Rheumatology- Juan Novak MD              Assessment & Plan     History and interview support the following diagnoses:   Major depressive disorder, recurrent, moderate with seasonal component  Generalized anxiety disorder, with panic  PTSD  Insomnia unspecified   Alcohol use disorder, severe, in sustained remission  Grief    Pertinent Medical Diagnoses:  Chronic pain  Rheumatoid arthritis  Ankylosing spondylitis ([id number] positive)  Lumbar degenerative disc disease  Hypertension  Hyperlipidemia  Continuous opioid use     Jamison is a 59-year-old adult with past psychiatric history of depression, anxiety, trauma, and alcohol use disorder who presents for a transfer of care diagnostic assessment.     Jamison was last seen on 08/30/2024 for a transfer of care visit. At that appointment we reduced HS Seroquel from 100mg nightly, which he had only been taking ~2 nights per week due to AM grogginess, to 25-50mg at bedtime with goal of more consistent use and enhanced sleep.A referral was placed for sleep medicine, psychotherapy, and MTM to consider other antidepressants.     Today, Jamison reports no changes in symptoms since he was last seen. He endorses ongoing anxiety and mood symptoms in the context of physical pain and physical limitations. He is scheduled for a cardiac stress test in several weeks which also seems to be driving anxiety to some extent. He reports moderate improvement in sleep with reduction in Seroquel to 50mg at bedtime - this has also improved his adherence as he does not experience morning grogginess at this dose which was a problem at higher doses  (100-150mg nightly). He does not maintain a consistent sleep schedule which interferes with medication adherence (particularly with HS Seroquel and Prazosin). He has not yet met with PharmD team but would like to move forward with this to consider alternate antidepressant options given suboptimal treatment of depression at max dose of Prozac. Consider serotonin modulator or SNRI (Savella, Fetzima) for secondary pain benefit. He does have a history of HTN which would need to be monitored closely with SNRI trial. We discussed the importance of medication adherence (notes that nightmares/vivid dreaming occurs when he misses Prazosin) and steps to take to follow a more consistent sleep schedule. Jamison denies all safety concerns today, including SI or thoughts of self-harm.     Assessment from initial consultation on 08/30/24:  Today, Jamison reports ongoing symptoms of depression, including anhedonia, amotivation, fatigue, insomnia, worthlessness, and guilt. Perpetuating factors include chronic pain, medical co-morbidities, and psychosocial stressors including a strained relationship with his mother. He is sober from alcohol for over 10 years; substance use includes nightly use of medical cannabis for pain/relaxation and continuous opioid use for pain management. Jamison denies suicidal/homicidal ideation.     Sleep is poor. He has no consistent bedtime and often will sleep in a chair versus his bed so as not to wake his wife up. He has a history of snoring and frequent nighttime awakenings due to a multitude of factors (pain, nightmares, needing to use the bathroom). He endorses significant daytime fatigue.    With regards to medications, he reports consistent administration of Prozac however is only taking Seroquel and Prazosin approximately twice weekly. He has not been using the currently prescribed dose of Seroquel 100mg as it causes notable morning grogginess. When he does take Prazosin he notices improved sleep/fewer  nightmares. He regularly forgets or falls asleep before taking his bedtime medications. Today we will plan to reduce Seroquel to 25-50mg at bedtime to reduce morning grogginess and subsequently improve adherence. We talked about the benefits of maintaining a bedtime routine with consistent administration of medications to optimize sleep and improve daytime fatigue. Will also refer to sleep medicine due to chronic insomnia, snoring, and BMI ~36.     Jamison notes that he has been on Prozac for multiple years with unclear benefit. He is interested in switching antidepressants which I feel is reasonable given suboptimal coverage of symptoms at this time. We discussed potential use of a serotonin modulator (Trintellix or Viibryd) which may help with sexual side effects or switching to an SNRI (consider Savella, Fetzima) for secondary pain benefit. Will refer for MTM follow-up visit to assist with cross-titration, review of potential DDI with new agent.     Psychotherapy discussion: Primary recommendation for the treatment of mood symptoms, especially anxiety. Supportive therapy can be useful for reducing the impact of acute or chronic psychosocial stressors. CBT can be particularly helpful for ruminative thinking and addressing maladaptive coping mechanisms that may worsen anxiety. Referral placed for psychotherapy through the [facility] today. Patient in agreement.     PSYCHOTROPIC DRUG INTERACTIONS: **Italicized interactions are for treatment plan options not currently implemented**  ADDITIVE SEROTONERGIC: fluoxetine, buprenorphine, oxycodone  ADDITIVE QTc: fluoxetine, quetiapine, buprenorphine  ADDITIVE ORTHOSTASIS: prazosin, buprenorphine, oxycodone   ADDITIVE CNS/RESPIRATORY DEPRESSION: buprenorphine, quetiapine, oxycodone     MANAGEMENT:  refer for MTM , use lowest therapeutic doses of psychotropic medication, and routine monitoring    [place] today:  controlled medications not prescribed by  psychiatry. PDMP history indicates continuous use of opioids (oxycodone and buprenorphine)              Plan    1) PSYCHOTROPIC MEDICATION RECOMMENDATIONS: (refills not needed at this time per patient)  -Continue Seroquel 25-50mg at bedtime and 25-50mg once daily as needed for anxiety  -Continue fluoxetine (PROZAC) 80 mg daily  -Continue prazosin (MINIPRESS) 2 mg at bedtime   -Continue melatonin 3 mg 3-4 hours before desired bedtime   -Start SAD Lamp 30 minutes each morning for seasonal mood component     Per Pain Clinic:  -buprenorphine 8mg TID  -Oxycodone - takes once every couple months; not very helpful.   -tizanidine   -Medical cannabis    Per Rheumatology:  -Methotrexate   -Xeljanz    2) THERAPY: Psychotherapy is a primary recommendation. Open to a referral.     3) NEXT DUE:   Labs- SGA labs : A1c and lipids due January 2025  ECG- Most recent EKG completed 12/2022 with QTc : 403  Rating Scales- AIMS: Due at next in-person visit    4) REFERRALS / COORDINATION:   -Referral placed for MTM, sleep medication evaluation, and therapy (08/30/24)    5) DISPOSITION: 11/25 at 1:30p    Treatment Risk Statement:  The patient understands the risks, benefits, adverse effects and alternatives. Agrees to treatment with the capacity to do so. No medical contraindications to treatment. Agrees to contact provider for any problems. The patient understands to call 911 or go to the nearest ED if urgent or life threatening symptoms occur. Crisis contact numbers are provided routinely in the After Visit Summary.    Suicide Risk Statement: Les Les did not appear to be an imminent safety risk to self or others.    PROVIDER:  JUANITA Gillis CNP              Pertinent Background  Depression started in high school. Has had good moments but overall feels that his mood remains chronically low. History of alcohol abuse with multiple inpatient and outpatient treatments; last drink was over 10 years ago. Multiple psychiatric  "hospitalizations; most recently in 2021 for worsening SI.   Pertinent Items Include: suicidal ideation, SIB [cutting], multiple psychotropic trials , severe med reaction (reports that he experienced a 1-week period of \"psychosis\" after switching from fluoxetine to mirtazapine around 2012), psych hosp (<3), SUBSTANCE USE: alcohol, substance use treatment  and Major Medical Problems (Rheumatoid Arthritis and Ankylosing Spondylitis)               Interim History    Jamison was last seen on 08/30/2024 for a transfer of care visit. At that appointment we reduced HS Seroquel from 100mg nightly, which he had only been taking ~2 nights per week due to AM grogginess, to 25-50mg at bedtime with goal of more consistent use and enhanced sleep. A referral was placed for sleep medicine, psychotherapy, and MTM to consider other antidepressants.     Today:  -Things are \"about the same.\"   -Taking Seroquel more frequently at bedtime (50mg) with lower dose which is better tolerated. Also uses a couple times a week for breakthrough anxiety.  -Has had some recent pain flare-ups recently. Using prednisone more frequently which helps with inflammation but also makes him nervous.   -Sleeping maybe a bit better with Seroquel. Falling asleep okay, some vivid dreams here and there especially if he forgets Prazosin (only taking about 3 nights per week). Inconsistent sleep schedule.   -Symptoms: amotivation, worthlessness, sleep disruption. Denies SI or thoughts of self-harm.    Physical health-  -Upcoming stress test in Nov.   -Increased sweating, exercise intolerance.  -Increased physical pain (RA versus OA)     Current Medications  -Continue fluoxetine (PROZAC) 80 mg daily - takes daily. Has been on medication for many years.   -prazosin (MINIPRESS) 2 mg at bedtime - takes about 2-3 nights per week.   -Seroquel 50mg at bedtime and 25-50mg every day PRN - taking 50mg nightly with good benefit. Uses PRN dose during the day ~2 times per week for " breakthrough anxiety.     Medication adherence: He takes Prozac daily. He tends to fall asleep before he remembers to take HS medications leading to poor adherence with Seroquel and Prazosin.   Medication ASE: Denies    Recent Psych Symptoms:   Depression:  depressed mood, anhedonia, low energy, and feeling worthless  Elevated:  none  Psychosis:  none  Anxiety:  excessive worry and nervous/overwhelmed  Trauma Related:  intrusive memories, nightmares, flashbacks, and trauma trigger psychological / physiological response  Insomnia:  Yes: Wakes several times per night; energy low during the day, snores  Other:  No    Pertinent Negatives: No suicidal or violent ideation, psychosis, or hypo/otto.      Pertinent Social Hx:  FINANCIAL SUPPORT- Finances obtained through disability (has been on disability for about 4 years)  LIVING SITUATION / RELATIONSHIPS- Lives with his wife and 3 dogs.    SOCIAL/ SPIRITUAL SUPPORT- Wife and adult children. Kids come over every Sunday for family dinner.    Pertinent Substance Use  Alcohol- None; has been sober for at least 10 years. One relapse in the setting of his step-dad's passing.   Nicotine- Chews nicotine gum for the last couple years; previously chewed tobacco  Caffeine- Rare  Opioids- Continuous opioid use  Narcan Kit- Yes  THC/CBD- Prescribed medical cannabis by pain management (gummies).   Other Illicit Drugs- none              Medical Review of Systems   Dizziness/orthostasis- Denies dizziness.  Headaches- Migraines about once per month.   Respiratory- Endorses SOB on exertion; completing a stress test in the near future. Uses Maxalt about once every 2 months.   Cardiovascular- tachycardia, sweating on moderate exertion. At rest denies tachycardia. No chest pain.   Gastrointestinal- Constipation issues; taking docusate. BM almost everyday.   Tics, tremors- denies  Sexual health concerns- ongoing low libido  A comprehensive review of systems was performed and is negative other  "than noted above.              Psych Summary Points  08/2023- Taper down and discontinue liothyronine  10/2023- More depressed. Increased quetiapine from 25 mg at bedtime to 150 mg at bedtime; started melatonin  12/2023- Increased the dose of prazosin to 2 mg at bedtime   4/2024- Had been splitting quetiapine 150 mg into 4 because he was not sure that it was increased. Recommended to take quetiapine 150 mg at bedtime. Reported daytime sedation with quetiapine 150 mg at bedtime. Decreased quetiapine (Seroquel) from 150 mg at bedtime to 100 mg at bedtime and 25-50 mg daily PRN for anxiety  08/2024: Transfer of care visit with Mervat Wylie CNP on 08/30/24. Decreased Seroquel to 25-50mg at bedtime due to morning grogginess from Seroquel 100mg and reduced adherence as a results. Referral for sleep med and MTM placed.               Past Psychotropic Medications  Medication  Dose   (mg) Effect  Dates of Use   Fluoxetine 40-80 Felt like was initially helpful 2016 - 2017 2022-2023   Sertraline 100 Effective initially, eventually self discontinued as not helpful 2018 - 7/2020   Duloxetine 30 BID Used to treat nerve pain; felt weird on it 2017   Bupropion  BID ineffective 2017 - 2019   Nortriptyline 50 For pain 2017 -  3/2019   Mirtazapine   Dissociated for entire first week after taking  Per MTM on 5/15/23: \"\"some kind of psychosis\". States that he was also taking oxycodone, alprazolam, fentanyl patches at the time\" 2012   Trazodone 50 Morning grogginess; didn't like how it made him feel.   2013    Abilify 5mg    2021    Hydroxyzine 25 QID prn For anxiety and panic attacks; not effective for severe anxiety 2015 - 2017   Buspirone 15 TID   2016 - 2017   Gabapentin 100 TID   2013 - 2016   Alprazolam 0.5 TID For anxiety 1237-8601   Diazepam 2 BID For anxiety 2016   Lorazepam 1 TID For anxiety 2016   Quetiapine  25-50   [id number]   Clonazepam 1 For anxiety 2016    Lexapro 20mg  09/2020-03/2021                 Past Medical " "History     Medication allergies:    Allergies   Allergen Reactions    Mirtazapine      Other reaction(s): Coma    Hydrocodone Itching    Mirtazapine Other (See Comments)     \"Blacked out\" for one week    Ms Contin [Morphine] Itching     Neurologic Hx [head injury, seizures, etc.]: Denies seizures as an adult (alludes to previous seizures as a child; took phenobarbital as a child - thinks this was for migraines), concussion in youth.   Patient Active Problem List   Diagnosis    CARDIOVASCULAR SCREENING; LDL GOAL LESS THAN 160    Obesity, Class I, BMI 30-34.9    Tear meniscus knee, right, initial encounter    Rheumatoid arthritis involving multiple sites, unspecified rheumatoid factor presence    Chronic pain    Right-sided thoracic back pain, unspecified chronicity    Generalized anxiety disorder    Panic attack    Ankylosing spondylitis (H)    Moderate major depression (H)    Immunocompromised (H)    Essential hypertension with goal blood pressure less than 140/90    Suicidal ideation    Major depressive disorder, recurrent episode, severe with mixed features (H)    Chronic back pain greater than 3 months duration    Contact dermatitis and eczema    Dermatitis    Drug dependence (H)    Encounter for screening colonoscopy    Esophageal reflux    Hematuria    Hyperlipidemia    Lumbar radiculopathy    Plantar fascial fibromatosis    Sprain of sacroiliac region    Overweight    Nonintractable migraine    Narcotic abuse, continuous (H)    Morbid obesity (H)    Chronic, continuous use of opioids     Past Medical History:   Diagnosis Date    Ankylosing spondylitis (H) 03/01/2017    Anxiety     Arthritis     back, knees    Back pain 11/17/2013    possible drug seeking behavior in the past.    Gastroesophageal reflux disease     Hypertension     Overweight (BMI 25.0-29.9) 03/06/2012    Panic attacks     Syncope, unspecified syncope type 02/27/2017    Tobacco use disorder 6/19/2017                 Medications   Current " Outpatient Medications   Medication Sig Dispense Refill    atorvastatin (LIPITOR) 20 MG tablet Take 1 tablet (20 mg) by mouth daily. 90 tablet 2    buprenorphine (SUBUTEX) 8 MG SUBL sublingual tablet Place 1 tablet (8 mg) under the tongue 3 times daily. Fill/start 10/09/24.  30 day script. 90 tablet 0    docusate sodium (COLACE) 100 MG capsule Take 2 capsules (200 mg) by mouth 2 times daily 60 capsule 10    FLUoxetine (PROZAC) 40 MG capsule Take 2 capsules (80 mg) by mouth daily. 180 capsule 0    folic acid (FOLVITE) 1 MG tablet Take 3 tablets (3,000 mcg) by mouth daily. 270 tablet 2    losartan (COZAAR) 25 MG tablet Take 1 tablet (25 mg) by mouth daily 90 tablet 3    medical cannabis (Patient's own supply) See Admin Instructions (The purpose of this order is to document that the patient reports taking medical cannabis.  This is not a prescription, and is not used to certify that the patient has a qualifying medical condition.)     Pills PRN   Lozenges PRN      methotrexate 2.5 MG tablet Take 8 tablets (20 mg) by mouth every 7 days. 96 tablet 3    naloxone (NARCAN) 4 MG/0.1ML nasal spray Spray 1 spray (4 mg) into one nostril alternating nostrils as needed for opioid reversal every 2-3 minutes until assistance arrives 0.2 mL 1    ondansetron (ZOFRAN ODT) 4 MG ODT tab Take 1 tablet (4 mg) by mouth every 8 hours as needed for nausea 30 tablet 3    oxyCODONE (ROXICODONE) 5 MG tablet Take 1 tablet (5 mg) by mouth every 6 hours as needed for severe pain Max of 3/day. Fill/begin 7/26/2024 Short term script for acute pain flare. 42 tablet 0    prazosin (MINIPRESS) 2 MG capsule Take 1 capsule (2 mg) by mouth at bedtime 90 capsule 0    predniSONE (DELTASONE) 5 MG tablet Take 3 tabs daily for 1 week, then take 2 tabs daily for 1 week 35 tablet 1    QUEtiapine (SEROQUEL) 100 MG tablet Take 1 tablet (100 mg) by mouth at bedtime 30 tablet 1    QUEtiapine (SEROQUEL) 25 MG tablet Take 1-2 tablets (25-50 mg) by mouth daily as needed  (Anxiety) 60 tablet 0    tiZANidine (ZANAFLEX) 4 MG tablet Take 1 tablet (4 mg) by mouth 3 times daily as needed for muscle spasms 3 month supply 225 tablet 1    tofacitinib (XELJANZ XR) 11 MG 24 hr tablet Take 1 tablet (11 mg) by mouth daily. Hold for signs of infection, then seek medical attention. 90 tablet 2                 Physical Exam  (Vitals Only)  There were no vitals taken for this visit.    Pulse Readings from Last 5 Encounters:   08/02/24 75   06/10/24 (!) 46   05/20/24 52   04/11/24 52   03/01/24 65     Wt Readings from Last 5 Encounters:   08/30/24 133.4 kg (294 lb)   08/02/24 132 kg (291 lb)   06/10/24 128.5 kg (283 lb 3.2 oz)   05/30/24 127 kg (280 lb)   03/01/24 128.8 kg (284 lb)     BP Readings from Last 5 Encounters:   08/02/24 132/88   06/10/24 130/77   05/20/24 134/87   04/11/24 129/87   03/01/24 (!) 140/86                 Mental Status Exam  Alertness: alert  and oriented  Appearance: adequately groomed  Behavior/Demeanor: cooperative, pleasant, and calm, with good eye contact   Speech: normal and regular rate and rhythm  Language: intact and no problems  Psychomotor: normal or unremarkable  Mood: depressed and anxious  Affect: restricted; was congruent to mood  Thought Process/Associations: unremarkable  Thought Content:  Reports none;  Denies suicidal ideation, violent ideation, and delusions  Perception:  Reports none;  Denies auditory hallucinations and visual hallucinations  Insight: intact  Judgment: fair  Cognition: does  appear grossly intact; formal cognitive testing was not done  oriented: time, person, and place  Gait and Station:  N/A ([facility])              Data       10/24/2023     9:47 AM 4/9/2024     9:30 AM 8/30/2024     9:46 AM   PROMIS-10 Total Score w/o Sub Scores   PROMIS TOTAL - SUBSCORES 15 16 16         6/12/2023     8:05 PM 7/12/2023     5:00 PM 11/20/2023     3:06 PM   CAGE-AID Total Score   Total Score 0 0 4   Total Score MyChart 0 (A total score of 2 or greater is  considered clinically significant)           8/2/2024    10:42 AM 8/30/2024     9:27 AM 10/21/2024     8:53 AM   PHQ   PHQ-9 Total Score 11 9 14   Q9: Thoughts of better off dead/self-harm past 2 weeks Not at all Not at all Not at all         7/12/2023     5:00 PM 11/20/2023     3:06 PM 8/30/2024     9:28 AM   JEFF-7 SCORE   Total Score   3 (minimal anxiety)   Total Score 5 3 3         Liver/kidney function Metabolic Blood counts   Recent Labs   Lab Test 08/02/24  1125 03/01/24  1016 05/17/23  1007 12/08/22  1425   CR 1.03 1.02   < > 0.98   AST 32 29   < > 32   ALT 31 29   < > 49   ALKPHOS  --  129  --  112    < > = values in this interval not displayed.    Recent Labs   Lab Test 01/11/24  1106   CHOL 185   TRIG 66   *   HDL 41   A1C 5.5   TSH 2.54    Recent Labs   Lab Test 08/02/24  1125   WBC 8.6   HGB 14.7   HCT 44.1   MCV 90           ECG results from 12/08/22   EKG 12-lead complete w/read - Clinics    Impression    NSR no significant changes from 6/21 study  Ruperto Resendez PA-C       *Note: Due to a large number of results and/or encounters for the requested time period, some results have not been displayed. A complete set of results can be found in Results Review.     Psychiatry Individual Psychotherapy Note   Psychotherapy start time - 0906  Psychotherapy end time - 0923  Date treatment plan last reviewed with patient - 08/30/24  Subjective: This supportive psychotherapy session addressed issues related to goals of therapy and current psychosocial stressors. Patient's reaction: Contemplation in the context of mental status appropriate for ambulatory setting.    Interactive complexity indicated? No  Plan: RTC in timeframe noted above  Psychotherapy services during this visit included myself and the patient.   Treatment Plan      SYMPTOMS; PROBLEMS   MEASURABLE GOALS;    FUNCTIONAL IMPROVEMENT / GAINS INTERVENTIONS DISCHARGE CRITERIA   Depression: depressed mood, anhedonia, low energy, and insomnia    reduce depressive symptoms, find enjoyment at least once a day, learn best practices for sleep, reduce nightmares, stay free of alcohol abuse , and take medications as prescribed on a daily basis Supportive / psychodynamic marked symptom improvement     Administrative Billing:   Level of Medical Decision Making:   - At least 1 chronic problem that is not stable  - Engaged in prescription drug management during visit (discussed any medication benefits, side effects, alternatives, etc.)    The longitudinal plan of care for the diagnosis(es)/condition(s) as documented above were addressed during this visit. Due to the added complexity in care, I will continue to support Les in the subsequent management and with ongoing continuity of care.

## 2024-10-21 ENCOUNTER — VIRTUAL VISIT (OUTPATIENT)
Dept: PSYCHIATRY | Facility: CLINIC | Age: 59
End: 2024-10-21
Payer: COMMERCIAL

## 2024-10-21 DIAGNOSIS — G47.00 INSOMNIA, UNSPECIFIED TYPE: ICD-10-CM

## 2024-10-21 DIAGNOSIS — F33.1 MAJOR DEPRESSIVE DISORDER, RECURRENT EPISODE, MODERATE (H): Primary | ICD-10-CM

## 2024-10-21 DIAGNOSIS — F43.10 PTSD (POST-TRAUMATIC STRESS DISORDER): ICD-10-CM

## 2024-10-21 DIAGNOSIS — F41.1 GENERALIZED ANXIETY DISORDER WITH PANIC ATTACKS: ICD-10-CM

## 2024-10-21 DIAGNOSIS — F41.0 GENERALIZED ANXIETY DISORDER WITH PANIC ATTACKS: ICD-10-CM

## 2024-10-21 PROCEDURE — 90833 PSYTX W PT W E/M 30 MIN: CPT | Mod: 95

## 2024-10-21 PROCEDURE — G2211 COMPLEX E/M VISIT ADD ON: HCPCS | Mod: 95

## 2024-10-21 PROCEDURE — 99214 OFFICE O/P EST MOD 30 MIN: CPT | Mod: 95

## 2024-10-21 RX ORDER — QUETIAPINE FUMARATE 25 MG/1
TABLET, FILM COATED ORAL
Qty: 60 TABLET | Refills: 0 | Status: SHIPPED | OUTPATIENT
Start: 2024-10-21

## 2024-10-21 RX ORDER — PRAZOSIN HYDROCHLORIDE 2 MG/1
2 CAPSULE ORAL AT BEDTIME
Qty: 90 CAPSULE | Refills: 0 | Status: SHIPPED | OUTPATIENT
Start: 2024-10-21

## 2024-10-21 ASSESSMENT — PATIENT HEALTH QUESTIONNAIRE - PHQ9
10. IF YOU CHECKED OFF ANY PROBLEMS, HOW DIFFICULT HAVE THESE PROBLEMS MADE IT FOR YOU TO DO YOUR WORK, TAKE CARE OF THINGS AT HOME, OR GET ALONG WITH OTHER PEOPLE: NOT DIFFICULT AT ALL
SUM OF ALL RESPONSES TO PHQ QUESTIONS 1-9: 14
SUM OF ALL RESPONSES TO PHQ QUESTIONS 1-9: 14

## 2024-10-21 NOTE — PROGRESS NOTES
Virtual Visit Details    Type of service:  Video Visit   Video Start Time: 0903  Video End Time: 0926    Originating Location (pt. Location): Home    Distant Location (provider location):  On-site  Platform used for Video Visit: RkWell

## 2024-10-21 NOTE — NURSING NOTE
Current patient location:  [place]    Is the patient currently in the state of [place]? YES    Visit mode:VIDEO    If the visit is dropped, the patient can be reconnected by: VIDEO VISIT: Text to cell phone:   Telephone Information:   Mobile [phone]    and VIDEO VISIT: Send to e-mail at: [email or web address]    Will anyone else be joining the visit? NO  (If patient encounters technical issues they should call [phone])    Are changes needed to the allergy or medication list? Pt stated no changes to allergies and Pt stated no med changes    Are refills needed on medications prescribed by this physician? NO    Rooming Documentation:  Questionnaire(s) completed    Reason for visit: BERTO Guzman VVF

## 2024-10-21 NOTE — PATIENT INSTRUCTIONS
Medication Plan  -Continue Seroquel 25-50mg at bedtime and 25-50mg once daily as needed for anxiety  -Continue fluoxetine (PROZAC) 80 mg daily  -Continue prazosin (MINIPRESS) 2 mg at bedtime   -Continue melatonin 3 mg 3-4 hours before desired bedtime   -Start SAD Lamp 30 minutes each morning for seasonal mood component     *Refer for MTM visit to consider options for switching antidepressants.     **For crisis resources, please see the information at the end of this document**   Patient Education    Thank you for coming to the [facility].     Lab Testing:  If you had lab testing today and your results are reassuring or normal they will be mailed to you or sent through [employer] within 7 days. If the lab tests need quick action we will call you with the results. The phone number we will call with results is # [phone]. If this is not the best number please call our clinic and change the number.     Medication Refills:  If you need any refills please call your pharmacy and they will contact us. Our fax number for refills is [phone].   Three business days of notice are needed for general medication refill requests.   Five business days of notice are needed for controlled substance refill requests.   If you need to change to a different pharmacy, please contact the new pharmacy directly. The new pharmacy will help you get your medications transferred.     Contact Us:  Please call [phone] during business hours (8-5:00 M-F).   If you have medication related questions after clinic hours, or on the weekend, please call [phone].     Financial Assistance [phone]   Medical Records [phone]       MENTAL HEALTH CRISIS RESOURCES:  For a emergency help, please call 911 or go to the nearest Emergency Department.     Emergency Walk-In Options:   [facility] Unit @ [facility] ([place]): [phone] - Specialized mental health emergency area designed to be  [facility] ([place]): [phone]  [facility] ([place]): [phone]  [facility] ([place]): [phone]    County Crisis Information:   [place]: [phone]  [place]: [phone]  [place] ([place]) - Adult: [phone]     Child: [phone]  [place] - Adult: [phone]     Child: [phone]  [place]: [phone]  List of all [facility] resources:   https://[place].gov/dhs/people-we-serve/adults/health-care/mental-health/resources/crisis-contacts.jsp    National Crisis Information:   Crisis Text Line: Text  [place]  to 553360  Suicide & Crisis Lifeline: 988  National Suicide Prevention Lifeline: 4-688-907-TALK (1-219.701.7740)       For online chat options, visit https://suicidepreventionlifeline.org/chat/  Poison Control Center: 1-807.534.7413  Trans Lifeline: 1-427.552.1459 - Hotline for transgender people of all ages  [employer]: 2-321-431-7079 - Hotline for LGBT youth     For Non-Emergency Support:   Fast Tracker: Mental Health & Substance Use Disorder Resources -   https://www.[employer].org/

## 2024-10-28 NOTE — PROGRESS NOTES
Medication Therapy Management (MTM) Encounter    ASSESSMENT:                            Medication Adherence/Access: No issues identified.    Hypertension   Stable. Continue current medication.     Hyperlipidemia   Stable. Continue current medication.     Depression:   Reviewed impact of missing nighttime medications that lead to poor sleep and contribute to low energy/motivation/mood during the day. Discussed importance of sleep routine in interrupting this cycle and patient developed plan to set an alarm to take bedtime medications at 9:30pm each night. Encouraged him to get a pill box to help with remembering whether he's taken doses, which has sometimes been difficult.  Reviewed potential medications changes and discussed possible benefit of SNRIs that may help neuropathy. Reviewed previous trial with duloxetine that seems to have only lasted 1-2 weeks and likely increased dose after one week. Discussed that we could try starting with a lower dose or not increase as quickly. If hesitancy prevents retrial with duloxetine, could consider Fetzima and determine insurance coverage.  BP has been slightly higher than goal, so would recommend checking BP somewhat regularly after starting.  Could also consider vilazodone, though previously was having significant nausea, which may be a limiting side effect from the med.      Rheumatoid Arthritis:   Plan in place. Continue current medication.     Pain:   Seems to be manageable, though buprenorphine likely contributing to constipation. See below. Antidepressant change may further help with neuropathy/burning pain.     Constipation:  Buprenorphine is likely contributing to constipation and patient may benefit from starting senna. Pt agreed.     PLAN:                            -start taking senna 8.6mg once daily. You can get this in combination with docusate  -set an alarm for 9:30pm to take bedtime meds  -start using pill box for morning and bedtime medications  -Let us  "know if you'd like to start making an antidepressant change prior to your next appointment    Follow-up: psychiatry 11/22/24    SUBJECTIVE/OBJECTIVE:                          Jamison Breen is a 59 year old male seen for an initial visit. He was referred to me from psychiatry, Mervat GRANT.      Reason for visit: med review.    Allergies/ADRs: Reviewed in chart  Past Medical History: Reviewed in chart  Tobacco: He reports that he has never smoked. He has never been exposed to tobacco smoke. He quit smokeless tobacco use about 4 years ago.  His smokeless tobacco use included chew.  Alcohol: not currently using    Medication Adherence/Access: does not use pill box. Forget bedtime meds 4-5 days per week    Hypertension     -losartan 25mg daily  Patient reports no current medication side effects  Patient does not self-monitor blood pressure.         Hyperlipidemia     Atorvastatin 20mg daily  Patient reports no significant myalgias or other side effects.       Depression:   -fluoxetine 80mg daily  -prazosin 2mg at bedtime  -quetiapine 50mg at bedtime + 25mg daily prn (2-3 times per week)  -has been using SAD lamp \"every once in a while\"    Has been taking fluoxetine for a couple years and thinks that it has lost some effect or maybe other stressors with health changes have gotten worse. Feels easily overwhelmed by things, especially with less daylight more recently. Has low energy, low motivation, overall low mood. Feels frustrated that he can't do things the way used to (exercise, etc) related to more pain in the last several years. Is seeing therapist every 4 weeks, sometimes more frequent.    Prazosin has been quite helpful for nightmares, though they return if he misses the dose. States that he may miss the dose 3-4 nights per week. Takes it when he is getting ready for bed, which varies, so sometimes misses dose if goes to bed later. On most nights per week, he will lay down for sleep sometime between " "10pm-midnight. Might there for a few hours, but usually wakes up at some point during the night for unknown reasons (maybe discomfort if laying flat) and often unable to get back to sleep. Might go lay in the chair and fall asleep there for a bit.   Other nights he will fall asleep without too much issue, but often still wakes during the night.     He's quite concerned about retrying a medication he's already taken \"since there was some reason I stopped it in the first place.\"                                                                                                                                      Past Psychotropic Medications  Medication  Dose   (mg) Effect  Dates of Use   Fluoxetine 40-80 Felt like was initially helpful 2016 - 2017 2022-2023   Sertraline 100 Effective initially, eventually self discontinued as not helpful 2018 - 7/2020   Duloxetine 30 BID Used to treat nerve pain; felt weird on it 2017   Bupropion  BID ineffective 2017 - 2019   Nortriptyline 50 For pain 2017 -  3/2019   Mirtazapine   Dissociated for entire first week after taking  Per MTM on 5/15/23: \"\"some kind of psychosis\". States that he was also taking oxycodone, alprazolam, fentanyl patches at the time\" 2012   Trazodone 50 Morning grogginess; didn't like how it made him feel.   2013    Abilify 5mg    2021    Hydroxyzine 25 QID prn For anxiety and panic attacks; not effective for severe anxiety 2015 - 2017   Buspirone 15 TID   2016 - 2017   Gabapentin 100 TID   2013 - 2016   Alprazolam 0.5 TID For anxiety 8590-8694   Diazepam 2 BID For anxiety 2016   Lorazepam 1 TID For anxiety 2016   Quetiapine  25-50   0914-8808   Clonazepam 1 For anxiety 2016    Lexapro 20mg   09/2020-03/2021        Rheumatoid Arthritis:   -methotrexate 20mg weekly  -Xeljanz ER 11mg daily  -prednisone 5mg daily  -folic acid 3mg daily    Notes that symptoms have been relatively stable with current med regimen. Has tolerated main meds without issue. Higher " "doses of prednisone increase his agitation and panic, so tries to limit use.     Pain:   -buprenorphine SL tabs 8mg three times daily  -oxycodone 5mg prn (about once every few months for major flares)  -acetaminophen 500mg 2-3 times per day  -ibuprofen 200mg 2-3 times per day  -ondansetron ODT 4mg every 8 hours prn (once a month)  -tizanidine 4mg TID prn (couple times per week)    Each of these med helps a little bit, but still has quite a bit of pain. Describes as burning, stabbing sensation in some places and aching arthritis pain in other places, \"all major joints\" when there is a flare up.  As below, endorsed constipation with hard BM every 2-3 days, but denied other side effects.  He was having more nausea about a month ago, often soon before he was going to eat, though this seems to be improving.      Constipation:  -docusate 100mg BID    BM frequency: every 2-3 days  Straining: Yes  Water intake: 5 large bottles per day    ----------------      I spent 60 minutes with this patient today. A copy of the visit note was provided to the patient's provider(s).    A summary of these recommendations was sent via clinic portal.    Caitlin Cohen PharmD  Medication Therapy Management Pharmacist  [facility]      Telemedicine Visit Details  The patient's medications can be safely assessed via a telemedicine encounter.  Type of service:  Telephone visit  Originating Location (pt. Location): Home    Distant Location (provider location):  Off-site  Start Time:  8:00am  End Time:  9:00am     Medication Therapy Recommendations  Drug-induced constipation   1 Rationale: Untreated condition - Needs additional medication therapy - Indication   Recommendation: Start Medication - start taking senna once daily   Status: Accepted - no CPA Needed   Identified Date: 10/29/2024 Completed Date: 10/29/2024            "

## 2024-10-29 ENCOUNTER — VIRTUAL VISIT (OUTPATIENT)
Dept: PHARMACY | Facility: CLINIC | Age: 59
End: 2024-10-29
Payer: COMMERCIAL

## 2024-10-29 DIAGNOSIS — M06.9 RHEUMATOID ARTHRITIS INVOLVING MULTIPLE SITES, UNSPECIFIED WHETHER RHEUMATOID FACTOR PRESENT (H): ICD-10-CM

## 2024-10-29 DIAGNOSIS — F33.1 MODERATE EPISODE OF RECURRENT MAJOR DEPRESSIVE DISORDER (H): Primary | ICD-10-CM

## 2024-10-29 DIAGNOSIS — I10 BENIGN ESSENTIAL HYPERTENSION: ICD-10-CM

## 2024-10-29 DIAGNOSIS — G89.4 CHRONIC PAIN SYNDROME: ICD-10-CM

## 2024-10-29 DIAGNOSIS — K59.03 DRUG-INDUCED CONSTIPATION: ICD-10-CM

## 2024-10-29 DIAGNOSIS — E78.5 HYPERLIPIDEMIA, UNSPECIFIED HYPERLIPIDEMIA TYPE: ICD-10-CM

## 2024-10-29 PROCEDURE — 99207 PR NO CHARGE LOS: CPT | Mod: 93 | Performed by: PHARMACIST

## 2024-10-29 NOTE — PATIENT INSTRUCTIONS
"Recommendations from today's MTM visit:                                                    MTM (medication therapy management) is a service provided by a clinical pharmacist designed to help you get the most of out of your medicines.   Today we reviewed what your medicines are for, how to know if they are working, that your medicines are safe and how to make your medicine regimen as easy as possible.      -start taking senna once daily. You can get this in combination with docusate  -set an alarm for 9:30pm to take bedtime meds  -start using pill box for morning and bedtime medications  -Let us know if you'd like to start making an antidepressant change prior to your next appointment    Follow-up: psychiatry 11/22/24    It was great speaking with you today.  I value your experience and would be very thankful for your time in providing feedback in our clinic survey. In the next few days, you may receive an email or text message from [employer] with a link to a survey related to your  clinical pharmacist.\"     To schedule another MTM appointment, please call the clinic directly or you may call the MTM scheduling line at [phone] or toll-free at [phone].     My Clinical Pharmacist's contact information:                                                      Please feel free to contact me with any questions or concerns you have.      Caitlin Cohen, PharmD  Medication Therapy Management Pharmacist  [facility]    "

## 2024-10-29 NOTE — Clinical Note
He wasn't sure on switching fluoxetine. We talked a lot about sleep routine and remembering HS meds, so he agreed to work on that for the next few weeks and will reach out if he wants to make a change prior to your visit in 4 weeks. I think duloxetine is a reasonable next step if he's willing.

## 2024-11-07 ENCOUNTER — TELEPHONE (OUTPATIENT)
Dept: RHEUMATOLOGY | Facility: CLINIC | Age: 59
End: 2024-11-07

## 2024-11-07 NOTE — TELEPHONE ENCOUNTER
FREE DRUG APPLICATION INITIATED    Medication: XELJANZ XR 11 MG PO TB24  Free Drug Program Name:  [employer]  Date Submitted: 11/7/2024  9:19 AM  Phone #: [phone]  Fax #: [phone]  Additional Information: Sent [employer] message to patient with forms. Provider form scanned into media tab. Routing to clinic for completion.

## 2024-11-10 ENCOUNTER — MYC MEDICAL ADVICE (OUTPATIENT)
Dept: PALLIATIVE MEDICINE | Facility: CLINIC | Age: 59
End: 2024-11-10
Payer: COMMERCIAL

## 2024-11-10 DIAGNOSIS — M05.79 RHEUMATOID ARTHRITIS INVOLVING MULTIPLE SITES WITH POSITIVE RHEUMATOID FACTOR (H): ICD-10-CM

## 2024-11-10 DIAGNOSIS — M54.59 LUMBAR FACET JOINT PAIN: ICD-10-CM

## 2024-11-10 DIAGNOSIS — F11.90 CHRONIC, CONTINUOUS USE OF OPIOIDS: ICD-10-CM

## 2024-11-10 DIAGNOSIS — F11.20 UNCOMPLICATED OPIOID DEPENDENCE (H): ICD-10-CM

## 2024-11-10 DIAGNOSIS — M45.7 ANKYLOSING SPONDYLITIS OF LUMBOSACRAL REGION (H): ICD-10-CM

## 2024-11-10 DIAGNOSIS — M51.360 DEGENERATION OF INTERVERTEBRAL DISC OF LUMBAR REGION WITH DISCOGENIC BACK PAIN: ICD-10-CM

## 2024-11-10 DIAGNOSIS — M25.50 MULTIPLE JOINT PAIN: ICD-10-CM

## 2024-11-10 DIAGNOSIS — M47.817 SPONDYLOSIS WITHOUT MYELOPATHY OR RADICULOPATHY, LUMBOSACRAL REGION: ICD-10-CM

## 2024-11-11 NOTE — TELEPHONE ENCOUNTER
Received call from patient requesting refill(s) of buprenorphine (SUBUTEX) 8 MG SUBL sublingual tablet    Last dispensed from pharmacy on 10/9/2024.    Patient's last office/virtual visit by prescribing provider on 8/20/2024.  Next office/virtual appointment scheduled for 11/18/2024.    Last urine drug screen date 5/20/2024  Current opioid agreement on file (completed within the last year) Yes Date of opioid agreement: 5/20/2024.    E-prescribe to:    [id number] - [place], [place] - [address] AT [address]    Will route to nursing [place] for review and preparation of prescription(s).

## 2024-11-11 NOTE — TELEPHONE ENCOUNTER
Please process a refill of subutex 8 MG to     [id number] - [place], [place] - [address] AT [address] & [address]  [address] [place] [address]  Phone: [phone] Fax: [phone]

## 2024-11-13 RX ORDER — BUPRENORPHINE 8 MG/1
8 TABLET SUBLINGUAL 3 TIMES DAILY
Qty: 90 TABLET | Refills: 0 | Status: SHIPPED | OUTPATIENT
Start: 2024-11-13

## 2024-11-13 NOTE — TELEPHONE ENCOUNTER
Medication refill information reviewed.     subutex last due:  Fill/start 10/09/24.  30 day script.  Due date:  overdue      Prescriptions prepped for review.     Cha RN-BSN  [facility]

## 2024-11-13 NOTE — TELEPHONE ENCOUNTER
Signed Prescriptions:                        Disp   Refills    buprenorphine (SUBUTEX) 8 MG SUBL sublingu*90 tab*0        Sig: Place 1 tablet (8 mg) under the tongue 3 times daily.           Fill/start 11/13/24.  30 day script.  Authorizing Provider: SUSANA PETTY        Reviewed [place]  November 13, 2024- no concerning fills.    Susana GRANT, RN CNP, FNP  [facility]

## 2024-11-18 ENCOUNTER — VIRTUAL VISIT (OUTPATIENT)
Dept: PALLIATIVE MEDICINE | Facility: CLINIC | Age: 59
End: 2024-11-18
Attending: NURSE PRACTITIONER
Payer: COMMERCIAL

## 2024-11-18 DIAGNOSIS — M79.18 MYOFASCIAL PAIN: ICD-10-CM

## 2024-11-18 DIAGNOSIS — M45.7 ANKYLOSING SPONDYLITIS OF LUMBOSACRAL REGION (H): ICD-10-CM

## 2024-11-18 DIAGNOSIS — M62.838 MUSCLE SPASM: ICD-10-CM

## 2024-11-18 DIAGNOSIS — M05.79 RHEUMATOID ARTHRITIS INVOLVING MULTIPLE SITES WITH POSITIVE RHEUMATOID FACTOR (H): ICD-10-CM

## 2024-11-18 DIAGNOSIS — F11.90 CHRONIC, CONTINUOUS USE OF OPIOIDS: ICD-10-CM

## 2024-11-18 DIAGNOSIS — M51.360 DEGENERATION OF INTERVERTEBRAL DISC OF LUMBAR REGION WITH DISCOGENIC BACK PAIN: ICD-10-CM

## 2024-11-18 DIAGNOSIS — M25.50 MULTIPLE JOINT PAIN: ICD-10-CM

## 2024-11-18 DIAGNOSIS — F11.20 UNCOMPLICATED OPIOID DEPENDENCE (H): ICD-10-CM

## 2024-11-18 DIAGNOSIS — M47.817 SPONDYLOSIS WITHOUT MYELOPATHY OR RADICULOPATHY, LUMBOSACRAL REGION: ICD-10-CM

## 2024-11-18 DIAGNOSIS — M54.59 LUMBAR FACET JOINT PAIN: Primary | ICD-10-CM

## 2024-11-18 RX ORDER — BUPRENORPHINE 8 MG/1
8 TABLET SUBLINGUAL 3 TIMES DAILY
Qty: 90 TABLET | Refills: 0 | Status: SHIPPED | OUTPATIENT
Start: 2024-11-18

## 2024-11-18 RX ORDER — OXYCODONE HYDROCHLORIDE 5 MG/1
5 TABLET ORAL EVERY 6 HOURS PRN
Qty: 42 TABLET | Refills: 0 | Status: SHIPPED | OUTPATIENT
Start: 2024-11-18

## 2024-11-18 ASSESSMENT — PAIN SCALES - GENERAL: PAINLEVEL_OUTOF10: EXTREME PAIN (9)

## 2024-11-18 NOTE — PROGRESS NOTES
Jamison is a 59 year old who is being evaluated via a billable video visit.    How would you like to obtain your AVS? [employer]  If the video visit is dropped, the invitation should be resent by: [facility] waiting room.   Will anyone else be joining your video visit? No  Is Pt currently in [place]? Yes          11/15/2023     8:33 AM 5/20/2024     9:58 AM 11/18/2024     7:59 AM   PEG Score   PEG Total Score 9 6.67 7            NOTE:  If Pt is not in [place], Appointment needs to be canceled and rescheduled.      Video-Visit Details    Type of service:  Video Visit   Video start: 0844  Video end: 0907    Originating Location (pt. Location): Home    Distant Location (provider location):  On-site  Platform used for Video Visit: Located within [facility]      11/18/2024        Chief complaint:   -Bilateral low back pain right > Left. Denies radiation into the legs  -Hands, wrists, shoulders, elbows, and bilateral knee pain  -Joint pain is the most bothersome from RA  -seeing new rheumatologist, likes them  -slipped on stairs at son's new home and tweaked back      Interval history:   Jailene Breen is a 59 year old male is known to me for   Lumbar spondylosis, pain is worse with extension and rotation indicating a facetogenic component to pain  Lumbar degenerative disc disease  SI joint pain  Ankylosing spondylitis  Myofascial pain  Chronic pain syndrome  PMHx includes: Panic attacks, back pain, arthritis, anxiety, ankylosing spondylitis  PSHx includes: Right knee surgery ×2, left foot surgery right knee arthroscopy        Recommendations/plan at the last visit on 8/20/2024 included:  Check out Shmuel Murray online for gentle exercise  Physical Therapy:  Referral to PHYSICAL THERAPY  Check out Shmuel Murray on YouTube. , easy, gentle exercises 30-90 seconds with excellent technique  Clinical Health Psychologist: continue work with your psychiatrist and therapist  Diagnostic  "Studies:  None  Medication Management:    continue Subutex 8mg  three times per day starting today. Next script fill 9/6 and start 9/8  Continue medical cannabis  continue tizanidine 2-4mg three times daily as needed for muscle spasms  Further procedures recommended: none  Referral to comprehensive weight management clinic  Reviewed 4 keys to weight loss  Don't drink calories (avoid sugary beverages)  Protein at every meal and snack and try to eat protein first  Brown over white meaning wheat bread, brown rice, whole wheat crackers and tortillas, etc.   Try not to eat out more than 2 times per week  Recommendations to PCP: see above   Follow up:12 weeks in-person/virtual. Please call [phone] to make your follow-up appointment with me.         Since his last visit, Jailene Breen reports:    Interval history November 18, 2024  -had been doing pretty well  -his son just got engaged and he bought a house  -Jamison was checking out the home and he slipped on the stairs and  tweaked his back.   -discussed possible use of Tumeric or massage therapy for acute on chronic pain.   -discussed adding in exercise in the \"shorts\" videos by Shmuel Murray online on YouTube  -Jamison has ongoing pain in the following areas:  -Bilateral low back pain right > Left. Denies radiation into the legs  -Hands, wrists, shoulders, elbows, and bilateral knee pain  -Joint pain is the most bothersome from RA  -seeing new rheumatologist, likes them  -slipped on stairs at son's new home and tweaked back      Interval history August 20, 2024  -he has really struggled the last 2 months.  -his bedroom is all torn up as it is leaking water. He is trying to do some of the work himself.   -this is stressful for him as it activity is different than he is used to.   -his back and his hips are bothering him due to increased activity.    -he tries to avoid use of prednisone as it makes him feel more anxious and can lead to panic attacks.   -he has known RA " "and Ankylosing Spondylitis  -he also has axial low back pain  -he has looked up Shmuel Murray but has not done any of his short exercises.   -he states he is supposed to have a stress test in the future  -notes his weight is getting close to 300 lbs. Discussed referral to comprehensive weight management program, patient interested.     Interval history May 20, 2024  -his bilateral low back pain without radiation has improved a little bit  -pain is worse after using the riding .  -helping his wife, she had knee replacement surgery.   Bilateral low back pain right > Left. Denies radiation into the legs  -Hands, wrists, shoulders, elbows, and bilateral knee pain  -Joint pain is the most bothersome from RA  -seeing new rheumatologist    Interval history February 19, 2024  -he has not been sleeping well.   -he has issues with nausea, he will feel hungry and then when he gets his food he will feel nausea and \"feel pukey.\"  -his wife had knee replacement surgery about 2 weeks ago.  -they have a dog that was recently fixed and he has been the one caring for the dog.   -he does use medical cannabis and he does feel that this is somewhat helpful for his nausea and helpful for pain.   -he is not on anything for reducing acid production in his gut, he states that his nausea and vomiting feels very acidic.   -he desires a repeat of lumbar RFA as his low back pain is worse, especially with lumbar extension and ext/rotation to the right and to the left.   -has been transitioned to Xeljanz but has not been able to take it regularly due to not feeling well.      Interval history November 15, 2023  -currently had COVID infection  -pain is worse right now as he cannot take his antirheumatological meds as he has COVID.   -pain is worse everywhere as he is sick and feverish and his joints are more painful.           At this point, the patient's participation with our multidisciplinary team includes:  The patient has been " "compliant with the program.  PT - last PHYSICAL THERAPY with Asif Servinjohn on 12/23/2020  Health Psych - worked with  Padilla Keene, last on 11/6/2018.  Working with Jonna Farrell PhD and been seen >8 times. Last visit with Santa was on 9/2/2020         Pain scores:    Pain intensity on average is 5 on a scale of 0-10.    Range is 5-10/10. When his pain is bad, it can stay elevated for a few days.  Pain right now is 9/10.   Pain is described as \"sharp, throbbing, stabbing, burning, stiffness.\"  Pain is constant in nature      Current pain relevant medications:      -nortriptyline 50mg at bedtime (helpful)  -ibuprofen 800mg TID PRN (using 3 times daily-helpful)  -Tylenol 500mg using 1000mg TID (unsure if helpful)  -Maxalt 10mg PRN migraine (helpful for migraine--he does have visual aura)  -naloxone nasal spray PRN for accidental opiate overdose  -Subutex 8 mg TID (somewhat helpful)  -medical cannabis PRN (helpful)      Other pertinent medications:   -Fluoxetine 80mg every day (somewhat helpful, managed by psychiatry)  -quetiapine 150mg at bedtime  -Xeljanz 11mg every 24 hours for RA (he was able to start this short term but had to stop this again due to illness)      Previous Medications:   OPIATES: Oxycodone (helpful), Fentanyl (100mcg/hr patch helpful), hydrocodone (itching), Tramadol (not helpful), Suboxone (somewhat helpful)  NSAIDS: ibuprofen (not helpful), Aleve (not helpful)  MUSCLE RELAXANTS: methocarbamol (somewhat helpful), Flexeril (somewhat helpful but caused severe urinary retention requiring catheterization), tizanidine (unsure if helpful), metaxalone (unsure), SOMA (helpful), chlorzoxazone (helped some)  ANTI-MIGRAINE MEDS: Maxalt (helpful for migraine), Imitrex injection (somewhat helpful)  ANTI-DEPRESSANTS: Prozac (helpful), Cymbalta (felt weird on it), nortriptyline (unsure if helpful), Buspar (unsure), Remeron (blacked out), bupropion (not helpful for smoking cessation)  SLEEP AIDS: Ambien " (helpful)  ANTI-CONVULSANTS: gabapentin (felt odd on this medication, unsure of dose), Lyrica (not helpful for 4 months, unsure of dose), Topamax (not helpful, he felt weird on it)   TOPICALS: Lidocaine patches (not helpful), Voltaren gel (not helpful)  Other meds: Tylenol (unsure if helpful)        Other treatments have included:   Jailene Breen has been seen at a pain clinic in the past.  [facility]  PT: tried, not helpful  Chiropractic: tried, not helpful  Acupuncture: none  TENs Unit: tried, helpful         Injections:     -has had injections at outside clinics  -has had epidural injections for low back pain (one was helpful)  -he has had lumbar medial branch blocks at [facility] and he was going to have lumbar radiofrequency ablation (did not do this due to cost)  -4/3/2017 right LMBBs with Dr. Ashlyn Gamble (helpful)  -4/26/2017 right LMBBs with Dr. Ashlyn Gamble (helpful)  -5/31/2017 right L3, L4, L5 RFA with Dr. Ashlyn Gamble (good relief for over 6 months)  -3/15/2018 right L5 transforaminal MAKAYLA with Dr. Ashlyn Gamble (not helpful)  -5/10/2018 trigger point injections with Dr. Ashlyn Gamble(somewhat helpful).  -7/12/2018 Right L3, L4, L5 RFA with Dr. Ashlyn Gamble (helpful).  -9/20/2018 Left piriformis injections, bilateral gluteal trigger point with Dr. Gamble (helpful).  -1/31/2019 right L2-3, L3-4 and L4-5 facet joint injections with Dr. Ashlyn Gamble (somewhat helpful)  4/4/2019  right L3, L4, L5/sacral ala lumbar radiofrequency ablation with Dr. Gamble (helpful over right side)  6/28/2019 Bilateral facet joint injections at l4-5, L5-S1 with Dr. Holley (only helpful for 7 days)  -2/12/2020 right V3-P7-G8-sacral ALA RFA done with Dr. Ashlyn Gamble (helpful)  -8/26/2020 left L3-5 lumbar medial branch blocks #1 with Dr. Ashlyn Gamble (very helpful)  -11/11/2020 left L3-5 lumbar medial branch blocks #2 with Dr. Ashlyn Gamble  (helpful)   -2/1/2021 bilateral lumbar RFA L4-5 and L5-S1  "with Dr. Ashlyn Gamble   (this \"helped a little bit\" and then he tweaked his back about 2 weeks after it was done and it wasn't helpful)  -09/22/2022 lumbosacral RFA L4, L5 and sacral ala with Dr. Aguilar Holley (helpful,, at least 50% relief, he has a RA flare right now, so harder to tell)  -7/20/2023 bilateral lumbar RFA at L4, L5 and Sacral ala with Dr. Aguilar Holley (helpful, has reduced pain by at least 50-60% in the lower back)  -4/11/2024 bilateral lumbar RFA at L4, L5 and S1 by Dr. Aguilar Holley (has reduced pain by at least 50% about 5 weeks post-procedure. Can take up to 8 weeks to reach maximum improvement)      THE 4 A's OF OPIOID MAINTENANCE ANALGESIA    Analgesia: Subutex is helpful    Activity: ADLs, doing some home projects due to water damage. Cares for his dogs    Adverse effects: none    Adherence to Rx protocol: yes          Side Effects: no side effect   Patient is using the medication as prescribed: YES     Medications:  Current Outpatient Medications   Medication Sig Dispense Refill    atorvastatin (LIPITOR) 20 MG tablet Take 1 tablet (20 mg) by mouth daily. 90 tablet 2    buprenorphine (SUBUTEX) 8 MG SUBL sublingual tablet Place 1 tablet (8 mg) under the tongue 3 times daily. Fill/start 11/13/24.  30 day script. 90 tablet 0    docusate sodium (COLACE) 100 MG capsule Take 2 capsules (200 mg) by mouth 2 times daily 60 capsule 10    FLUoxetine (PROZAC) 40 MG capsule Take 2 capsules (80 mg) by mouth daily. 180 capsule 0    folic acid (FOLVITE) 1 MG tablet Take 3 tablets (3,000 mcg) by mouth daily. 270 tablet 2    losartan (COZAAR) 25 MG tablet Take 1 tablet (25 mg) by mouth daily 90 tablet 3    medical cannabis (Patient's own supply) See Admin Instructions (The purpose of this order is to document that the patient reports taking medical cannabis.  This is not a prescription, and is not used to certify that the patient has a qualifying medical condition.)     Pills PRN   Lozenges PRN      " methotrexate 2.5 MG tablet Take 8 tablets (20 mg) by mouth every 7 days. 96 tablet 3    naloxone (NARCAN) 4 MG/0.1ML nasal spray Spray 1 spray (4 mg) into one nostril alternating nostrils as needed for opioid reversal every 2-3 minutes until assistance arrives 0.2 mL 1    ondansetron (ZOFRAN ODT) 4 MG ODT tab Take 1 tablet (4 mg) by mouth every 8 hours as needed for nausea 30 tablet 3    oxyCODONE (ROXICODONE) 5 MG tablet Take 1 tablet (5 mg) by mouth every 6 hours as needed for severe pain Max of 3/day. Fill/begin 7/26/2024 Short term script for acute pain flare. 42 tablet 0    prazosin (MINIPRESS) 2 MG capsule Take 1 capsule (2 mg) by mouth at bedtime. 90 capsule 0    predniSONE (DELTASONE) 5 MG tablet Take 3 tabs daily for 1 week, then take 2 tabs daily for 1 week 35 tablet 1    QUEtiapine (SEROQUEL) 25 MG tablet Take 1-2 tablets (25-50 mg) by mouth at bedtime. May also take 1-2 tablets (25-50 mg) daily as needed (Anxiety). 60 tablet 0    tiZANidine (ZANAFLEX) 4 MG tablet Take 1 tablet (4 mg) by mouth 3 times daily as needed for muscle spasms 3 month supply 225 tablet 1    tofacitinib (XELJANZ XR) 11 MG 24 hr tablet Take 1 tablet (11 mg) by mouth daily. Hold for signs of infection, then seek medical attention. 90 tablet 2       Medical History: any changes in medical history since they were last seen? No    Social History:    Home situation: , has 2 grown children  Occupation/Schooling: currently unemployed, worked as a  (unemployed since 3/1/2017). 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  Uses nicotine gum  If yes, was cessation counseling offered?  None needed        Physical Exam:     Vital signs: There were no vitals taken for this visit.     Behavioral observations:  Awake, alert.  Cooperative.   Pulm: respirations easy and unlabored. Able to speak in full sentences without SOB or cough noted.               [place] Pres[place]:  [place]ed [place]  11/18/2024- no concerning fills.  Susana GRANT, RN CNP, FNP  [facility]  [facility]          DIRE Score for selecting candidates for long term opioid analgesia for chronic pain:  Diagnosis  2  Intractablility  2  Risk    Psychological health  1    Chemical health  2    Reliability  2    Social support  2  Efficacy  1  Total DIRE Score = 12. Note that  7-13 predicts poor outcome (compliance and efficacy) from opioid prescribing; 14-21 predicts good outcome (compliance and efficacy)  from opioid prescribing        Assessment:    Lumbar facet joint pain  Spondylosis of lumbar region without myelopathy or radiculopathy  Degeneration of intervertebral disc of lumbar region with discogenic back pain  Ankylosing spondylitis of lumbosacral region  Rheumatoid arthritis involving multiple joints with positive rheumatoid factor  Multiple joint pain  Muscle spasm  Myofascial pain  Uncomplicated opiate dependence  Chronic continuous use of opioids    Encounter for long-term use of opiate analgesic  UDT 5/20/2024  Signed CSA 5/20/2024  PMHx includes: Panic attacks, back pain, arthritis, anxiety, ankylosing spondylitis  PSHx includes: Right knee surgery ×2, left foot surgery right knee arthroscopy          Plan:    Physical Therapy:  Referral to PHYSICAL THERAPY  Check out Shmuel Murray on YouTube. , easy, gentle exercises 30-90 seconds with excellent technique  Clinical Health Psychologist: continue work with your psychiatrist and therapist  Diagnostic Studies:  None  Medication Management:    continue Subutex 8mg  three times per day Next script fill 12/11 and start 12/13  Short script of oxycodone max 3/day. #42 fill and start 11/18  Continue medical cannabis  continue tizanidine  2-4mg three times daily as needed for muscle spasms  Further procedures recommended: none  Recommendations to PCP: see above   Follow up:12 weeks in-person/virtual. Please call [phone] to make your follow-up appointment with me.           ASSESSMENT AND PLAN:  (M54.59) Lumbar facet joint pain  (primary encounter diagnosis)  Comment:   Plan: buprenorphine (SUBUTEX) 8 MG SUBL sublingual         tablet, oxyCODONE (ROXICODONE) 5 MG tablet            (M47.817) Spondylosis without myelopathy or radiculopathy, lumbosacral region  Comment:   Plan: buprenorphine (SUBUTEX) 8 MG SUBL sublingual         tablet            (M51.360) Degeneration of intervertebral disc of lumbar region with discogenic back pain  Comment:   Plan: buprenorphine (SUBUTEX) 8 MG SUBL sublingual         tablet, oxyCODONE (ROXICODONE) 5 MG tablet            (M45.7) Ankylosing spondylitis of lumbosacral region (H)  Comment:   Plan: buprenorphine (SUBUTEX) 8 MG SUBL sublingual         tablet, oxyCODONE (ROXICODONE) 5 MG tablet            (M05.79) Rheumatoid arthritis involving multiple sites with positive rheumatoid factor (H)  Comment:   Plan: buprenorphine (SUBUTEX) 8 MG SUBL sublingual         tablet, oxyCODONE (ROXICODONE) 5 MG tablet            (M25.50) Multiple joint pain  Comment:   Plan: buprenorphine (SUBUTEX) 8 MG SUBL sublingual         tablet, oxyCODONE (ROXICODONE) 5 MG tablet            (M62.838) Muscle spasm  Comment:   Plan: tiZANidine (ZANAFLEX) 4 MG tablet            (M79.18) Myofascial pain  Comment:   Plan: tiZANidine (ZANAFLEX) 4 MG tablet            (F11.20) Uncomplicated opioid dependence (H)  Comment:   Plan: buprenorphine (SUBUTEX) 8 MG SUBL sublingual         tablet            (F11.90) Chronic, continuous use of opioids  Comment:   Plan: buprenorphine (SUBUTEX) 8 MG SUBL sublingual         tablet, naloxone (NARCAN) 4 MG/0.1ML nasal         spray, oxyCODONE (ROXICODONE) 5 MG tablet              BILLING TIME DOCUMENTATION:    TOTAL TIME includes:   Time spent preparing to see the patient: 3 minutes (reviewing records and tests)  Time spend face to face with the patient: 23 minutes  Time spent ordering tests, medications, procedures and referrals: 0 minutes  Time spent Referring and communicating with other healthcare professionals: 0 minutes  Documenting clinical information in Epic: 7 minutes    The total TIME spent on this patient on the day of the appointment was 33 minutes.                         Susana GRANT RN CNP, FNP  [facility]  [facility]

## 2024-11-18 NOTE — PATIENT INSTRUCTIONS
Plan:    Physical Therapy:  Referral to PHYSICAL THERAPY  Check out Shmuel Trevor on YouTube. , easy, gentle exercises 30-90 seconds with excellent technique  Clinical Health Psychologist: continue work with your psychiatrist and therapist  Diagnostic Studies:  None  Medication Management:    continue Subutex 8mg  three times per day Next script fill 12/11 and start 12/13  Short script of oxycodone max 3/day. #42 fill and start 11/18  Continue medical cannabis  continue tizanidine 2-4mg three times daily as needed for muscle spasms  Further procedures recommended: none  Recommendations to PCP: see above   Follow up:12 weeks in-person/virtual. Please call [phone] to make your follow-up appointment with me.     ----------------------------------------------------------------  Clinic Number:  [phone]   Call with any questions about your care and for scheduling assistance.   Calls are returned Monday through Friday between 8 AM and 4:30 PM. We usually get back to you within 2 business days depending on the issue/request.    If we are prescribing your medications:  For opioid medication refills, call the clinic or send a [employer] message 7 days in advance.  Please include:  Name of requested medication  Name of the pharmacy.  For non-opioid medications, call your pharmacy directly to request a refill. Please allow 3-4 days to be processed.   Per [place] State Law:  All controlled substance prescriptions must be filled within 30 days of being written.    For those controlled substances allowing refills, pickup must occur within 30 days of last fill.      We believe regular attendance is key to your success in our program!    Any time you are unable to keep your appointment we ask that you call us at least 24 hours in advance to cancel.This will allow us to offer the appointment time to another patient.   Multiple missed appointments may lead to dismissal from the clinic.   
nothing

## 2024-11-24 NOTE — PROGRESS NOTES
[facility]  MEDICAL PROGRESS NOTE     Jailene Breen is a 59 year old is transferring care from the resident clinic where he was most recently followed by Dr. Hernández.     Initial consultation on 08/30/24.   CARE TEAM:   PCP- Minda Monzon  Therapist- None  Pain- Susana GRANT CNP  Rheumatology- Juan Novak MD              Assessment & Plan     History and interview support the following diagnoses:   Major depressive disorder, recurrent, moderate with seasonal component  Generalized anxiety disorder, with panic  PTSD  Insomnia unspecified   Alcohol use disorder, severe, in sustained remission  Grief    Pertinent Medical Diagnoses:  Chronic pain  Rheumatoid arthritis  Ankylosing spondylitis ([id number] positive)  Lumbar degenerative disc disease  Hypertension  Hyperlipidemia  Continuous opioid use     Jamison is a 59-year-old adult with past psychiatric history of depression, anxiety, trauma, and alcohol use disorder who presents for psychiatric follow-up. Jamison was last seen 10/21/24 at which time no medication changes were made. It was recommended Jamison initiate consistent use of a SAD lamp for his seasonal mood component. He met with Caitlin Cohen for an MTM visit on 10/29/24 - no antidepressant changes were made.    Today, Jamison continues to experience notable fatigue during the day. He has been taking Seroquel 37.5mg-75mg at bedtime over the past month. He reports more consistent use however continues to report notable daytime fatigue with consistent napping throughout the day which leads to broken sleep at night. We will decrease HS Seroquel to 25mg nightly and re-assess during our next visit. He has had several trauma related nightmares over the past few nights. He tends to experience time-limited dissociation the morning after a nightmare. Will continue Prazosin 2mg for now and consider dose optimization should nightmares continue or  increase. Caution dizziness due to history of lower heart rates per review of recent vitals. Future considerations include switching from fluoxetine to SNRI (duloxetine, Fetzima).     Assessment from initial consultation on 08/30/24:  Today, Jamison reports ongoing symptoms of depression, including anhedonia, amotivation, fatigue, insomnia, worthlessness, and guilt. Perpetuating factors include chronic pain, medical co-morbidities, and psychosocial stressors including a strained relationship with his mother. He is sober from alcohol for over 10 years; substance use includes nightly use of medical cannabis for pain/relaxation and continuous opioid use for pain management. Jamison denies suicidal/homicidal ideation.     Sleep is poor. He has no consistent bedtime and often will sleep in a chair versus his bed so as not to wake his wife up. He has a history of snoring and frequent nighttime awakenings due to a multitude of factors (pain, nightmares, needing to use the bathroom). He endorses significant daytime fatigue.    With regards to medications, he reports consistent administration of Prozac however is only taking Seroquel and Prazosin approximately twice weekly. He has not been using the currently prescribed dose of Seroquel 100mg as it causes notable morning grogginess. When he does take Prazosin he notices improved sleep/fewer nightmares. He regularly forgets or falls asleep before taking his bedtime medications. Today we will plan to reduce Seroquel to 25-50mg at bedtime to reduce morning grogginess and subsequently improve adherence. We talked about the benefits of maintaining a bedtime routine with consistent administration of medications to optimize sleep and improve daytime fatigue. Will also refer to sleep medicine due to chronic insomnia, snoring, and BMI ~36.     Jamison notes that he has been on Prozac for multiple years with unclear benefit. He is interested in switching antidepressants which I feel is reasonable  given suboptimal coverage of symptoms at this time. We discussed potential use of a serotonin modulator (Trintellix or Viibryd) which may help with sexual side effects or switching to an SNRI (consider Savella, Fetzima) for secondary pain benefit. Will refer for MTM follow-up visit to assist with cross-titration, review of potential DDI with new agent.     Psychotherapy discussion: Primary recommendation for the treatment of mood symptoms, especially anxiety. Supportive therapy can be useful for reducing the impact of acute or chronic psychosocial stressors. CBT can be particularly helpful for ruminative thinking and addressing maladaptive coping mechanisms that may worsen anxiety. Referral placed for psychotherapy through the [facility] today. Patient in agreement.     PSYCHOTROPIC DRUG INTERACTIONS: **Italicized interactions are for treatment plan options not currently implemented**  ADDITIVE SEROTONERGIC: fluoxetine, buprenorphine, oxycodone  ADDITIVE QTc: fluoxetine, quetiapine, buprenorphine  ADDITIVE ORTHOSTASIS: prazosin, buprenorphine, oxycodone   ADDITIVE CNS/RESPIRATORY DEPRESSION: buprenorphine, quetiapine, oxycodone     MANAGEMENT:  refer for MTM , use lowest therapeutic doses of psychotropic medication, and routine monitoring    [place] was [place] today:  controlled medications not prescribed by psychiatry. PDMP history indicates continuous use of opioids (oxycodone and buprenorphine)              Plan    1) PSYCHOTROPIC MEDICATION RECOMMENDATIONS: (refills not needed at this time per patient)  -Decrease Seroquel to 25mg nightly + 25mg once daily PRN   -Continue fluoxetine (PROZAC) 80 mg daily  -Continue prazosin (MINIPRESS) 2 mg at bedtime   -Continue melatonin 3 mg 3-4 hours before desired bedtime   -Start SAD Lamp 30 minutes each morning for seasonal mood component     Pain:   -buprenorphine SL tabs 8mg three times daily  -oxycodone 5mg prn (about once every few months for major  "flares)  -acetaminophen 500mg 2-3 times per day  -ibuprofen 200mg 2-3 times per day  -ondansetron ODT 4mg every 8 hours prn (once a month)  -tizanidine 4mg TID prn (couple times per week)    Rheumatoid Arthritis:   -methotrexate 20mg weekly  -Xeljanz ER 11mg daily  -prednisone 5mg daily  -folic acid 3mg daily    2) THERAPY: Psychotherapy is a primary recommendation. Open to a referral.     3) NEXT DUE:   Labs- SGA labs : A1c and lipids due January 2025  ECG- Most recent EKG completed 12/2022 with QTc : 403  Rating Scales- AIMS: Due at next in-person visit    4) REFERRALS / COORDINATION:   -Referral placed for MTM, sleep medication evaluation, and therapy (08/30/24)    5) DISPOSITION: 12/23 at     Treatment Risk Statement:  The patient understands the risks, benefits, adverse effects and alternatives. Agrees to treatment with the capacity to do so. No medical contraindications to treatment. Agrees to contact provider for any problems. The patient understands to call 911 or go to the nearest ED if urgent or life threatening symptoms occur. Crisis contact numbers are provided routinely in the After Visit Summary.    Suicide Risk Statement: Les Les did not appear to be an imminent safety risk to self or others.    PROVIDER:  JUANITA Gillis CNP              Pertinent Background  Depression started in high school. Has had good moments but overall feels that his mood remains chronically low. History of alcohol abuse with multiple inpatient and outpatient treatments; last drink was over 10 years ago. Multiple psychiatric hospitalizations; most recently in 2021 for worsening SI.   Pertinent Items Include: suicidal ideation, SIB [cutting], multiple psychotropic trials , severe med reaction (reports that he experienced a 1-week period of \"psychosis\" after switching from fluoxetine to mirtazapine around 2012), psych hosp (<3), SUBSTANCE USE: alcohol, substance use treatment  and Major Medical Problems (Rheumatoid " "Arthritis and Ankylosing Spondylitis)               Interim History    Jamison was last seen 10/21/24 at which time no medication changes were made. It was recommended Les initiate consistent use of a SAD lamp for his seasonal mood component. He met with Caitlin Cohen for an MTM visit on 10/29/24 - no antidepressant changes were made. Since the last visit:    -Things have been going \"okay.\" Mood is good today.   -The last two nights he had nightmares. Some time-limited dissociation in the morning that improved once he got going.  -He's taking Seroquel 37.5mg nightly (a quarter of a 150mg tablet). Feels fatigued during the day, napping frequently which impairs sleep at night.  -Mood has been \"down.\" Consistent with seasonal mood component. Denies SI/NSSI/HI.  -Has not yet started consistent use of light box but knows that this helps with seasonal depression.    Medication ASE: Fatigue from Seroquel - currently taking 37.5mg nightly    Per MTM visit with Caitlin Cohen [facility] on 10/29/24:  \"Reviewed impact of missing nighttime medications that lead to poor sleep and contribute to low energy/motivation/mood during the day. Discussed importance of sleep routine in interrupting this cycle and patient developed plan to set an alarm to take bedtime medications at 9:30pm each night. Encouraged him to get a pill box to help with remembering whether he's taken doses, which has sometimes been difficult.  Reviewed potential medications changes and discussed possible benefit of SNRIs that may help neuropathy. Reviewed previous trial with duloxetine that seems to have only lasted 1-2 weeks and likely increased dose after one week. Discussed that we could try starting with a lower dose or not increase as quickly. If hesitancy prevents retrial with duloxetine, could consider Fetzima and determine insurance coverage.  BP has been slightly higher than goal, so would recommend checking BP somewhat regularly after starting.  Could also " "consider vilazodone, though previously was having significant nausea, which may be a limiting side effect from the med.\"    Recent Psych Symptoms:   Depression:  depressed mood, anhedonia, low energy, and feeling worthless. PHQ-9: 9 (improved from 14 at our previous visit)  Elevated:  none  Psychosis:  none  Anxiety:  excessive worry and nervous/overwhelmed  Trauma Related:  intrusive memories, nightmares, flashbacks, and trauma trigger psychological / physiological response  Insomnia:  Yes: Wakes several times per night; energy low during the day, snores  Other:  No    Pertinent Negatives: No suicidal or violent ideation, psychosis, or hypo/otto.      Pertinent Social Hx:  FINANCIAL SUPPORT- Finances obtained through disability (has been on disability for about 4 years)  LIVING SITUATION / RELATIONSHIPS- Lives with his wife and 3 dogs.    SOCIAL/ SPIRITUAL SUPPORT- Wife and adult children. Kids come over every Sunday for family dinner.    Pertinent Substance Use  Alcohol- None; has been sober for at least 10 years. One relapse in the setting of his step-dad's passing.   Nicotine- Chews nicotine gum for the last couple years; previously chewed tobacco  Caffeine- Rare  Opioids- Continuous opioid use  Narcan Kit- Yes  THC/CBD- Prescribed medical cannabis by pain management (gummies).   Other Illicit Drugs- none              Medical Review of Systems   Dizziness/orthostasis- Denies dizziness.  Headaches- Migraines about once per month.   Respiratory- Endorses SOB on exertion; completing a stress test in the near future. Uses Maxalt about once every 2 months.   Cardiovascular- tachycardia, sweating on moderate exertion. At rest denies tachycardia. No chest pain.   Gastrointestinal- Constipation issues; taking docusate. BM almost everyday.   Tics, tremors- denies  Sexual health concerns- ongoing low libido  A comprehensive review of systems was performed and is negative other than noted above.              Psych Summary " "Points  08/2023- Taper down and discontinue liothyronine  10/2023- More depressed. Increased quetiapine from 25 mg at bedtime to 150 mg at bedtime; started melatonin  12/2023- Increased the dose of prazosin to 2 mg at bedtime   4/2024- Had been splitting quetiapine 150 mg into 4 because he was not sure that it was increased. Recommended to take quetiapine 150 mg at bedtime. Reported daytime sedation with quetiapine 150 mg at bedtime. Decreased quetiapine (Seroquel) from 150 mg at bedtime to 100 mg at bedtime and 25-50 mg daily PRN for anxiety  08/2024: Transfer of care visit with Mervat Wylie CNP on 08/30/24. Decreased Seroquel to 25-50mg at bedtime due to morning grogginess from Seroquel 100mg and reduced adherence as a results. Referral for sleep med and MTM placed.   10/2024: MTM visit with Caitlin Cohen completed; no medication changes.   11/2024: Decreased Seroquel to 25mg nightly + 25mg once daily PRN anxiety/panic. Start light box ~30 minutes daily.              Past Psychotropic Medications  Medication  Dose   (mg) Effect  Dates of Use   Fluoxetine 40-80 Felt like was initially helpful 2016 - 2017 2022-2023   Sertraline 100 Effective initially, eventually self discontinued as not helpful 2018 - 7/2020   Duloxetine 30 BID Used to treat nerve pain; felt weird on it 2017   Bupropion  BID ineffective 2017 - 2019   Nortriptyline 50 For pain 2017 -  3/2019   Mirtazapine   Dissociated for entire first week after taking  Per MTM on 5/15/23: \"\"some kind of psychosis\". States that he was also taking oxycodone, alprazolam, fentanyl patches at the time\" 2012   Trazodone 50 Morning grogginess; didn't like how it made him feel.   2013    Abilify 5mg    2021    Hydroxyzine 25 QID prn For anxiety and panic attacks; not effective for severe anxiety 2015 - 2017   Buspirone 15 TID   2016 - 2017   Gabapentin 100 TID   2013 - 2016   Alprazolam 0.5 TID For anxiety [id number]   Diazepam 2 BID For anxiety 2016   Lorazepam 1 " "TID For anxiety 2016   Quetiapine  25-50   [id number]   Clonazepam 1 For anxiety 2016    Lexapro 20mg  09/2020-03/2021                 Past Medical History     Medication allergies:    Allergies   Allergen Reactions    Mirtazapine      Other reaction(s): Coma    Hydrocodone Itching    Mirtazapine Other (See Comments)     \"Blacked out\" for one week    Ms Contin [Morphine] Itching     Neurologic Hx [head injury, seizures, etc.]: Denies seizures as an adult (alludes to previous seizures as a child; took phenobarbital as a child - thinks this was for migraines), concussion in youth.   Patient Active Problem List   Diagnosis    CARDIOVASCULAR SCREENING; LDL GOAL LESS THAN 160    Obesity, Class I, BMI 30-34.9    Tear meniscus knee, right, initial encounter    Rheumatoid arthritis involving multiple sites, unspecified rheumatoid factor presence    Chronic pain    Right-sided thoracic back pain, unspecified chronicity    Generalized anxiety disorder    Panic attack    Ankylosing spondylitis (H)    Moderate major depression (H)    Immunocompromised (H)    Essential hypertension with goal blood pressure less than 140/90    Suicidal ideation    Major depressive disorder, recurrent episode, severe with mixed features (H)    Chronic back pain greater than 3 months duration    Contact dermatitis and eczema    Dermatitis    Drug dependence (H)    Encounter for screening colonoscopy    Esophageal reflux    Hematuria    Hyperlipidemia    Lumbar radiculopathy    Plantar fascial fibromatosis    Sprain of sacroiliac region    Overweight    Nonintractable migraine    Narcotic abuse, continuous (H)    Morbid obesity (H)    Chronic, continuous use of opioids     Past Medical History:   Diagnosis Date    Ankylosing spondylitis (H) 03/01/2017    Anxiety     Arthritis     back, knees    Back pain 11/17/2013    possible drug seeking behavior in the past.    Gastroesophageal reflux disease     Hypertension     Overweight (BMI 25.0-29.9) " 03/06/2012    Panic attacks     Syncope, unspecified syncope type 02/27/2017    Tobacco use disorder 6/19/2017                 Medications   Current Outpatient Medications   Medication Sig Dispense Refill    atorvastatin (LIPITOR) 20 MG tablet Take 1 tablet (20 mg) by mouth daily. 90 tablet 2    buprenorphine (SUBUTEX) 8 MG SUBL sublingual tablet Place 1 tablet (8 mg) under the tongue 3 times daily. Fill 12/11/2024 start 12/13/24.  30 day script. 90 tablet 0    docusate sodium (COLACE) 100 MG capsule Take 2 capsules (200 mg) by mouth 2 times daily 60 capsule 10    FLUoxetine (PROZAC) 40 MG capsule Take 2 capsules (80 mg) by mouth daily. 180 capsule 0    folic acid (FOLVITE) 1 MG tablet Take 3 tablets (3,000 mcg) by mouth daily. 270 tablet 2    losartan (COZAAR) 25 MG tablet Take 1 tablet (25 mg) by mouth daily 90 tablet 3    medical cannabis (Patient's own supply) See Admin Instructions (The purpose of this order is to document that the patient reports taking medical cannabis.  This is not a prescription, and is not used to certify that the patient has a qualifying medical condition.)     Pills PRN   Lozenges PRN      methotrexate 2.5 MG tablet Take 8 tablets (20 mg) by mouth every 7 days. 96 tablet 3    naloxone (NARCAN) 4 MG/0.1ML nasal spray Spray 1 spray (4 mg) into one nostril alternating nostrils as needed for opioid reversal. every 2-3 minutes until assistance arrives 0.2 mL 1    ondansetron (ZOFRAN ODT) 4 MG ODT tab Take 1 tablet (4 mg) by mouth every 8 hours as needed for nausea 30 tablet 3    oxyCODONE (ROXICODONE) 5 MG tablet Take 1 tablet (5 mg) by mouth every 6 hours as needed for severe pain. Max of 3/day. Fill/begin 11/18/2024 Short term script for acute pain flare. 42 tablet 0    prazosin (MINIPRESS) 2 MG capsule Take 1 capsule (2 mg) by mouth at bedtime. 90 capsule 0    predniSONE (DELTASONE) 5 MG tablet Take 3 tabs daily for 1 week, then take 2 tabs daily for 1 week 35 tablet 1    QUEtiapine  (SEROQUEL) 25 MG tablet Take 1-2 tablets (25-50 mg) by mouth at bedtime. May also take 1-2 tablets (25-50 mg) daily as needed (Anxiety). 60 tablet 0    tiZANidine (ZANAFLEX) 4 MG tablet Take 1 tablet (4 mg) by mouth 3 times daily as needed for muscle spasms. 3 month supply 225 tablet 1    tofacitinib (XELJANZ XR) 11 MG 24 hr tablet Take 1 tablet (11 mg) by mouth daily. Hold for signs of infection, then seek medical attention. 90 tablet 2                 Physical Exam  (Vitals Only)  Wt 127 kg (280 lb)   BMI 34.54 kg/m      Pulse Readings from Last 5 Encounters:   08/02/24 75   06/10/24 (!) 46   05/20/24 52   04/11/24 52   03/01/24 65     Wt Readings from Last 5 Encounters:   11/25/24 127 kg (280 lb)   08/30/24 133.4 kg (294 lb)   08/02/24 132 kg (291 lb)   06/10/24 128.5 kg (283 lb 3.2 oz)   05/30/24 127 kg (280 lb)     BP Readings from Last 5 Encounters:   08/02/24 132/88   06/10/24 130/77   05/20/24 134/87   04/11/24 129/87   03/01/24 (!) 140/86                 Mental Status Exam  Alertness: alert  and oriented  Appearance: adequately groomed  Behavior/Demeanor: cooperative, pleasant, and calm, with good eye contact   Speech: normal and regular rate and rhythm  Language: intact and no problems  Psychomotor: normal or unremarkable  Mood: depressed and anxious  Affect: restricted; was congruent to mood  Thought Process/Associations: unremarkable  Thought Content:  Reports none;  Denies suicidal ideation, violent ideation, and delusions  Perception:  Reports none;  Denies auditory hallucinations and visual hallucinations  Insight: intact  Judgment: fair  Cognition: does  appear grossly intact; formal cognitive testing was not done  oriented: time, person, and place  Gait and Station:  N/A ([facility])              Data       10/24/2023     9:47 AM 4/9/2024     9:30 AM 8/30/2024     9:46 AM   PROMIS-10 Total Score w/o Sub Scores   PROMIS TOTAL - SUBSCORES 15 16 16         6/12/2023     8:05 PM 7/12/2023     5:00 PM  11/20/2023     3:06 PM   CAGE-AID Total Score   Total Score 0 0 4   Total Score MyChart 0 (A total score of 2 or greater is considered clinically significant)           8/30/2024     9:27 AM 10/21/2024     8:53 AM 11/25/2024     1:12 PM   PHQ   PHQ-9 Total Score 9 14 9    Q9: Thoughts of better off dead/self-harm past 2 weeks Not at all  Not at all  Not at all        Patient-reported         7/12/2023     5:00 PM 11/20/2023     3:06 PM 8/30/2024     9:28 AM   JEFF-7 SCORE   Total Score   3 (minimal anxiety)   Total Score 5 3 3         Liver/kidney function Metabolic Blood counts   Recent Labs   Lab Test 08/02/24  1125 03/01/24  1016 05/17/23  1007 12/08/22  1425   CR 1.03 1.02   < > 0.98   AST 32 29   < > 32   ALT 31 29   < > 49   ALKPHOS  --  129  --  112    < > = values in this interval not displayed.    Recent Labs   Lab Test 01/11/24  1106   CHOL 185   TRIG 66   *   HDL 41   A1C 5.5   TSH 2.54    Recent Labs   Lab Test 08/02/24  1125   WBC 8.6   HGB 14.7   HCT 44.1   MCV 90           ECG results from 12/08/22   EKG 12-lead complete w/read - Clinics    Impression    NSR no significant changes from 6/21 study  Ruperto Resendez PA-C       *Note: Due to a large number of results and/or encounters for the requested time period, some results have not been displayed. A complete set of results can be found in Results Review.     Psychiatry Individual Psychotherapy Note   Psychotherapy start time - 1:26P  Psychotherapy end time - 1:43P  Date treatment plan last reviewed with patient - 08/30/24  Subjective: This supportive psychotherapy session addressed issues related to goals of therapy and current psychosocial stressors. Patient's reaction: Contemplation in the context of mental status appropriate for ambulatory setting.    Interactive complexity indicated? No  Plan: RTC in timeframe noted above  Psychotherapy services during this visit included myself and the patient.   Treatment Plan      SYMPTOMS; PROBLEMS    MEASURABLE GOALS;    FUNCTIONAL IMPROVEMENT / GAINS INTERVENTIONS DISCHARGE CRITERIA   Depression: depressed mood, anhedonia, low energy, and insomnia   reduce depressive symptoms, find enjoyment at least once a day, learn best practices for sleep, reduce nightmares, stay free of alcohol abuse , and take medications as prescribed on a daily basis Supportive / psychodynamic marked symptom improvement     Administrative Billing:   Level of Medical Decision Making:   - At least 1 chronic problem that is not stable  - Engaged in prescription drug management during visit (discussed any medication benefits, side effects, alternatives, etc.)    The longitudinal plan of care for the diagnosis(es)/condition(s) as documented above were addressed during this visit. Due to the added complexity in care, I will continue to support Les in the subsequent management and with ongoing continuity of care.

## 2024-11-25 ENCOUNTER — VIRTUAL VISIT (OUTPATIENT)
Dept: PSYCHIATRY | Facility: CLINIC | Age: 59
End: 2024-11-25
Payer: COMMERCIAL

## 2024-11-25 VITALS — BODY MASS INDEX: 34.54 KG/M2 | WEIGHT: 280 LBS

## 2024-11-25 DIAGNOSIS — F33.1 MAJOR DEPRESSIVE DISORDER, RECURRENT EPISODE, MODERATE (H): ICD-10-CM

## 2024-11-25 DIAGNOSIS — F41.1 GENERALIZED ANXIETY DISORDER WITH PANIC ATTACKS: ICD-10-CM

## 2024-11-25 DIAGNOSIS — F41.0 GENERALIZED ANXIETY DISORDER WITH PANIC ATTACKS: ICD-10-CM

## 2024-11-25 PROCEDURE — 99214 OFFICE O/P EST MOD 30 MIN: CPT | Mod: 95

## 2024-11-25 PROCEDURE — G2211 COMPLEX E/M VISIT ADD ON: HCPCS | Mod: 95

## 2024-11-25 PROCEDURE — 90833 PSYTX W PT W E/M 30 MIN: CPT | Mod: 95

## 2024-11-25 RX ORDER — QUETIAPINE FUMARATE 25 MG/1
TABLET, FILM COATED ORAL
Qty: 45 TABLET | Refills: 0 | Status: SHIPPED | OUTPATIENT
Start: 2024-11-25

## 2024-11-25 ASSESSMENT — PAIN SCALES - GENERAL: PAINLEVEL_OUTOF10: MODERATE PAIN (5)

## 2024-11-25 ASSESSMENT — PATIENT HEALTH QUESTIONNAIRE - PHQ9
SUM OF ALL RESPONSES TO PHQ QUESTIONS 1-9: 9
SUM OF ALL RESPONSES TO PHQ QUESTIONS 1-9: 9
10. IF YOU CHECKED OFF ANY PROBLEMS, HOW DIFFICULT HAVE THESE PROBLEMS MADE IT FOR YOU TO DO YOUR WORK, TAKE CARE OF THINGS AT HOME, OR GET ALONG WITH OTHER PEOPLE: SOMEWHAT DIFFICULT

## 2024-11-25 NOTE — PROGRESS NOTES
Virtual Visit Details    Type of service:  Video Visit   Video Start Time: 1:24 PM  Video End Time: 1:44 PM    Originating Location (pt. Location): Home    Distant Location (provider location):  On-site  Platform used for Video Visit: [place]

## 2024-11-25 NOTE — NURSING NOTE
Is the patient currently in the state of [place]? YES    Current patient location: [id number] [address]    Visit mode:VIDEO    If the visit is dropped, the patient can be reconnected by: VIDEO VISIT: Text to cell phone:   Telephone Information:   Mobile [phone]       Will anyone else be joining the visit? No  (If patient encounters technical issues they should call [phone])    Are changes needed to the allergy or medication list? No    Are refills needed on medications prescribed by this physician? No    Rooming Documentation: Questionnaire(s) completed.    Reason for visit: RECHECK     NOE Hunt

## 2024-11-25 NOTE — PATIENT INSTRUCTIONS
Plan:  -Decrease Seroquel to 25mg nightly + 25mg once daily PRN   -Continue fluoxetine (PROZAC) 80 mg daily  -Continue prazosin (MINIPRESS) 2 mg at bedtime   -Continue melatonin 3 mg 3-4 hours before desired bedtime   -Start SAD Lamp 30 minutes each morning for seasonal mood component     **For crisis resources, please see the information at the end of this document**   Patient Education    Thank you for coming to the [facility].     Lab Testing:  If you had lab testing today and your results are reassuring or normal they will be mailed to you or sent through [employer] within 7 days. If the lab tests need quick action we will call you with the results. The phone number we will call with results is # [phone]. If this is not the best number please call our clinic and change the number.     Medication Refills:  If you need any refills please call your pharmacy and they will contact us. Our fax number for refills is [phone].   Three business days of notice are needed for general medication refill requests.   Five business days of notice are needed for controlled substance refill requests.   If you need to change to a different pharmacy, please contact the new pharmacy directly. The new pharmacy will help you get your medications transferred.     Contact Us:  Please call [phone] during business hours (8-5:00 M-F).   If you have medication related questions after clinic hours, or on the weekend, please call [phone].     Financial Assistance [phone]   Medical Records [phone]       MENTAL HEALTH CRISIS RESOURCES:  For a emergency help, please call 911 or go to the nearest Emergency Department.     Emergency Walk-In Options:   [facility] Unit @ [facility] ([place]): [phone] - Specialized mental health emergency area designed to be calming  [facility] ([employer]): [phone]  [facility]  ([place]): [phone]  [facility] ([place]): [phone]    [facility] Crisis Information:   [place]: [phone]  [place]: [phone]  [place] ([place]) - Adult: [phone]     Child: [phone]  [place] - Adult: [phone]     Child: [phone]  [place]: [phone]  List of all [facility] resources:   https://[place].[facility]/dhs/people-we-serve/adults/health-care/mental-health/resources/crisis-contacts.jsp    National Crisis Information:   Crisis Text Line: Text  [place]  to 308088  Suicide & Crisis Lifeline: 988  National Suicide Prevention Lifeline: 1-867-234-TALK (1-356.722.3321)       For online chat options, visit https://suicidepreventionlifeline.org/chat/  Poison Control Center: 1-180.211.7444  Trans Lifeline: 1-694.350.8309 - Hotline for transgender people of all ages  The Tu Project: 9-370-860-5862 - Hotline for LGBT youth     For Non-Emergency Support:   Fast Tracker: Mental Health & Substance Use Disorder Resources -   https://www.[employer].org/

## 2024-12-12 NOTE — TELEPHONE ENCOUNTER
Called [employer] to follow up on application. I was told that they changed their application as of 11/11/2024, so we will have to send a new one. They are waiting from income verification from [employer]. They also require Medicare patients to be enrolled in the Medicare Prescription Payment Plan prior to being considered for assistance. Sent My Chart message to [employer] to see if this has been completed.    Faxed new provider application to [phone]([place])        If assistance is denied Les may have to call his insurance and inform them to lift the restriction so we can see if this is fillable with [facility] or if would qualify for FPAP. Will not qualify for FPAP if not covered by insurance first.

## 2024-12-16 NOTE — TELEPHONE ENCOUNTER
Date: 12/16    Phone Number: [phone]    Comments: pt will call tomorrow and send them the income info.  Will follow up next week

## 2024-12-20 NOTE — TELEPHONE ENCOUNTER
FREE DRUG APPLICATION INITIATED    Medication: XELJANZ XR 11 MG PO TB24  Free Drug Program Name:  [employer]  Date Submitted: 11/7/2024  9:19 AM  Phone #: [phone]  Fax #: [phone]  Additional Information: Faxed MD portion.  Follow up next week to confirm they received pt income information.

## 2024-12-30 ENCOUNTER — MYC MEDICAL ADVICE (OUTPATIENT)
Dept: PALLIATIVE MEDICINE | Facility: CLINIC | Age: 59
End: 2024-12-30
Payer: COMMERCIAL

## 2024-12-30 DIAGNOSIS — M05.79 RHEUMATOID ARTHRITIS INVOLVING MULTIPLE SITES WITH POSITIVE RHEUMATOID FACTOR (H): ICD-10-CM

## 2024-12-30 DIAGNOSIS — F11.90 CHRONIC, CONTINUOUS USE OF OPIOIDS: ICD-10-CM

## 2024-12-30 DIAGNOSIS — M25.50 MULTIPLE JOINT PAIN: ICD-10-CM

## 2024-12-30 DIAGNOSIS — M54.59 LUMBAR FACET JOINT PAIN: ICD-10-CM

## 2024-12-30 DIAGNOSIS — M51.360 DEGENERATION OF INTERVERTEBRAL DISC OF LUMBAR REGION WITH DISCOGENIC BACK PAIN: ICD-10-CM

## 2024-12-30 DIAGNOSIS — M45.7 ANKYLOSING SPONDYLITIS OF LUMBOSACRAL REGION (H): ICD-10-CM

## 2024-12-31 RX ORDER — OXYCODONE HYDROCHLORIDE 5 MG/1
5 TABLET ORAL EVERY 6 HOURS PRN
Qty: 42 TABLET | Refills: 0 | Status: CANCELLED | OUTPATIENT
Start: 2024-12-31

## 2024-12-31 NOTE — TELEPHONE ENCOUNTER
Received request for a refill(s) of oxyCODONE (ROXICODONE) 5 MG tablet      Last dispensed from pharmacy on 11/18/24    Patient's last office/virtual visit by prescribing provider on 11/18/24  Next office/virtual appointment scheduled for none    Last urine drug screen date 05/20/24  Current opioid agreement on file (completed within the last year) Yes Date of opioid agreement: 05/20/24    E-prescribe to pharmacy-  [id number] - [place], [place] - [address] [place] [address] AT [facility]       Will route to nursing [place] for review and preparation of prescription(s).

## 2024-12-31 NOTE — TELEPHONE ENCOUNTER
Medication refill information reviewed.     Oxycodone last due:  Fill/begin 11/18/2024 Short term script for acute pain flare     Per JUANITA Ramon CNP's 11/18/24/2024 visit plan:              Short script of oxycodone max 3/day. #42 fill and start 11/18     Per patient myChart message:  [facility] Pain Nurse (supporting JUANITA Ga CNP)18 hours ago (5:53 PM)   Hello, first, I hope you all had a merry [place] . Could you please refill my oxycodone script, I've been really hurting this past week and what I have isn't working.  Please send to [facility] in [place], thank you and have a happy new year!     Due date:  TBD      Prescriptions prepped for review.     Cha, RN-BSN  [facility]

## 2025-01-03 NOTE — TELEPHONE ENCOUNTER
See [employer] message sent to patient.     Susana GRANT RN CNP, FNP  [facility]

## 2025-01-06 ENCOUNTER — VIRTUAL VISIT (OUTPATIENT)
Dept: PSYCHIATRY | Facility: CLINIC | Age: 60
End: 2025-01-06
Payer: COMMERCIAL

## 2025-01-06 VITALS — BODY MASS INDEX: 35.31 KG/M2 | WEIGHT: 290 LBS | HEIGHT: 76 IN

## 2025-01-06 DIAGNOSIS — F33.1 MAJOR DEPRESSIVE DISORDER, RECURRENT EPISODE, MODERATE (H): ICD-10-CM

## 2025-01-06 DIAGNOSIS — F41.0 GENERALIZED ANXIETY DISORDER WITH PANIC ATTACKS: ICD-10-CM

## 2025-01-06 DIAGNOSIS — F41.1 GENERALIZED ANXIETY DISORDER WITH PANIC ATTACKS: ICD-10-CM

## 2025-01-06 PROCEDURE — 90833 PSYTX W PT W E/M 30 MIN: CPT | Mod: 95

## 2025-01-06 PROCEDURE — 98006 SYNCH AUDIO-VIDEO EST MOD 30: CPT

## 2025-01-06 PROCEDURE — G2211 COMPLEX E/M VISIT ADD ON: HCPCS | Mod: 95

## 2025-01-06 RX ORDER — FLUOXETINE 40 MG/1
80 CAPSULE ORAL DAILY
Qty: 180 CAPSULE | Refills: 0 | Status: SHIPPED | OUTPATIENT
Start: 2025-01-06

## 2025-01-06 ASSESSMENT — PATIENT HEALTH QUESTIONNAIRE - PHQ9
SUM OF ALL RESPONSES TO PHQ QUESTIONS 1-9: 12
SUM OF ALL RESPONSES TO PHQ QUESTIONS 1-9: 12
10. IF YOU CHECKED OFF ANY PROBLEMS, HOW DIFFICULT HAVE THESE PROBLEMS MADE IT FOR YOU TO DO YOUR WORK, TAKE CARE OF THINGS AT HOME, OR GET ALONG WITH OTHER PEOPLE: SOMEWHAT DIFFICULT

## 2025-01-06 NOTE — NURSING NOTE
Current patient location: [address] [facility] [address]    Is the patient currently in the state of [place]? YES    Visit mode:VIDEO    If the visit is dropped, the patient can be reconnected by:VIDEO VISIT: Text to cell phone:   Telephone Information:   Mobile [phone]       Will anyone else be joining the visit? NO  (If patient encounters technical issues they should call [phone]    Are changes needed to the allergy or medication list? No    Are refills needed on medications prescribed by this physician? YES    Rooming Documentation:  Questionnaire(s) completed    Reason for visit: RECHECK    Rosa RAMOSF

## 2025-01-06 NOTE — PROGRESS NOTES
Provider note locked.    Virtual Visit Details    Type of service:  Video Visit   Video Start Time: 4:03 PM  Video End Time: 4:35 PM    Originating Location (pt. Location): Home    Distant Location (provider location):  Off-site  Platform used for Video Visit: [employer]  Answers submitted by the patient for this visit:  Patient Health Questionnaire (Submitted on 1/6/2025)  If you checked off any problems, how difficult have these problems made it for you to do your work, take care of things at home, or get along with other people?: Somewhat difficult  PHQ9 TOTAL SCORE: 12

## 2025-01-06 NOTE — PROGRESS NOTES
[facility]  MEDICAL PROGRESS NOTE     Jailene Breen is a 59 year old is transferring care from the resident clinic where he was most recently followed by Dr. Hernández.     Initial consultation on 08/30/24.   CARE TEAM:   PCP- Minda Monzon  Therapist- None  Pain- Susana GRANT CNP  Rheumatology- Juan Novak MD              Assessment & Plan     History and interview support the following diagnoses:   Major depressive disorder, recurrent, moderate with seasonal component  Generalized anxiety disorder, with panic  PTSD  Insomnia unspecified   Alcohol use disorder, severe, in sustained remission  Grief    Pertinent Medical Diagnoses:  Chronic pain  Rheumatoid arthritis  Ankylosing spondylitis ([id number] positive)  Lumbar degenerative disc disease  Hypertension  Hyperlipidemia  Continuous opioid use     Jamison is a 59-year-old adult with past psychiatric history of depression, anxiety, trauma, and alcohol use disorder who presents for psychiatric follow-up. Jamison was last seen 11/25/24 at which time Seroquel was decreased to 25mg at bedtime + 25mg at bedtime PRN.     Today, Jamison reports ongoing low mood which is consistent with history of seasonal mood changes. He has not yet started light therapy and we did again review potential benefits of this. He did decrease Seroquel to 25mg at bedtime + 25mg every day PRN and this has been working well. He reports improved medication adherence, noting that he only misses his HS medications about 2 days per week which is a notable improvement.     His main concern today is memory concerns, particularly with short term recall. We discussed the many possible contributing factors including depression, anxiety, medication regimen, and challenges with sleep. Consider PharmD review of medications that could be contributing versus considering neuropsych testing. Will continue to discuss at follow-up visit.     Future  considerations include switching from fluoxetine to SNRI (duloxetine, Fetzima).     Assessment from initial consultation on 08/30/24:  Today, Jamison reports ongoing symptoms of depression, including anhedonia, amotivation, fatigue, insomnia, worthlessness, and guilt. Perpetuating factors include chronic pain, medical co-morbidities, and psychosocial stressors including a strained relationship with his mother. He is sober from alcohol for over 10 years; substance use includes nightly use of medical cannabis for pain/relaxation and continuous opioid use for pain management. Jamison denies suicidal/homicidal ideation.     Sleep is poor. He has no consistent bedtime and often will sleep in a chair versus his bed so as not to wake his wife up. He has a history of snoring and frequent nighttime awakenings due to a multitude of factors (pain, nightmares, needing to use the bathroom). He endorses significant daytime fatigue.    With regards to medications, he reports consistent administration of Prozac however is only taking Seroquel and Prazosin approximately twice weekly. He has not been using the currently prescribed dose of Seroquel 100mg as it causes notable morning grogginess. When he does take Prazosin he notices improved sleep/fewer nightmares. He regularly forgets or falls asleep before taking his bedtime medications. Today we will plan to reduce Seroquel to 25-50mg at bedtime to reduce morning grogginess and subsequently improve adherence. We talked about the benefits of maintaining a bedtime routine with consistent administration of medications to optimize sleep and improve daytime fatigue. Will also refer to sleep medicine due to chronic insomnia, snoring, and BMI ~36.     Jamison notes that he has been on Prozac for multiple years with unclear benefit. He is interested in switching antidepressants which I feel is reasonable given suboptimal coverage of symptoms at this time. We discussed potential use of a serotonin  modulator (Trintellix or Viibryd) which may help with sexual side effects or switching to an SNRI (consider Savella, Fetzima) for secondary pain benefit. Will refer for MTM follow-up visit to assist with cross-titration, review of potential DDI with new agent.     Psychotherapy discussion: Primary recommendation for the treatment of mood symptoms, especially anxiety. Supportive therapy can be useful for reducing the impact of acute or chronic psychosocial stressors. CBT can be particularly helpful for ruminative thinking and addressing maladaptive coping mechanisms that may worsen anxiety. Referral placed for psychotherapy through the [facility] today. Patient in agreement.     PSYCHOTROPIC DRUG INTERACTIONS: **Italicized interactions are for treatment plan options not currently implemented**  ADDITIVE SEROTONERGIC: fluoxetine, buprenorphine, oxycodone  ADDITIVE QTc: fluoxetine, quetiapine, buprenorphine  ADDITIVE ORTHOSTASIS: prazosin, buprenorphine, oxycodone   ADDITIVE CNS/RESPIRATORY DEPRESSION: buprenorphine, quetiapine, oxycodone     MANAGEMENT:  refer for MTM , use lowest therapeutic doses of psychotropic medication, and routine monitoring    [place] was checked today:  controlled medications not prescribed by psychiatry. PDMP history indicates continuous use of opioids (oxycodone and buprenorphine)              Plan    1) PSYCHOTROPIC MEDICATION RECOMMENDATIONS: (refills not needed at this time per patient)  -Continue Seroquel to 25mg nightly + 25mg once daily PRN   -Continue fluoxetine (PROZAC) 80 mg daily  -Continue prazosin (MINIPRESS) 2 mg at bedtime   -Continue melatonin 3 mg 3-4 hours before desired bedtime   -Start SAD Lamp 30 minutes each morning for seasonal mood component (has not yet started using consistently)     Pain:   -buprenorphine SL tabs 8mg three times daily  -oxycodone 5mg prn (about once every few months for major flares)  -acetaminophen 500mg 2-3 times per day  -ibuprofen  "200mg 2-3 times per day  -ondansetron ODT 4mg every 8 hours prn (once a month)  -tizanidine 4mg TID prn (couple times per week)    Rheumatoid Arthritis:   -methotrexate 20mg weekly  -Xeljanz ER 11mg daily  -prednisone 5mg daily  -folic acid 3mg daily    2) THERAPY: Psychotherapy is a primary recommendation. Open to a referral.     3) NEXT DUE:   Labs- [facility]GA labs : A1c and lipids due 2025  ECG-   Most recent [id number] with QTc : 403    Rating Scales- AIMS: Due at next in-person visit    4) REFERRALS / COORDINATION:   -Referral placed for MTM, sleep medication evaluation, and therapy (24)    5) DISPOSITION: 6-8 weeks, scheduling to call    Treatment Risk Statement:  The patient understands the risks, benefits, adverse effects and alternatives. Agrees to treatment with the capacity to do so. No medical contraindications to treatment. Agrees to contact provider for any problems. The patient understands to call 911 or go to the nearest ED if urgent or life threatening symptoms occur. Crisis contact numbers are provided routinely in the After Visit Summary.    Suicide Risk Statement: Les Les did not appear to be an imminent safety risk to self or others. He denied SI, HI, and NSSI.     PROVIDER:  JUANITA Gillis CNP              Pertinent Background  Depression started in high school. Has had good moments but overall feels that his mood remains chronically low. History of alcohol abuse with multiple inpatient and outpatient treatments; last drink was over 10 years ago. Multiple psychiatric hospitalizations; most recently in  for worsening SI.   Pertinent Items Include: suicidal ideation, SIB [cutting], multiple psychotropic trials , severe med reaction (reports that he experienced a 1-week period of \"psychosis\" after switching from fluoxetine to mirtazapine around ), psych hosp (<3), SUBSTANCE USE: alcohol, substance use treatment  and Major Medical Problems (Rheumatoid Arthritis and Ankylosing " "Spondylitis)               Interim History    Les was last seen 11/25/24 at which time Seroquel was decreased to 25mg at bedtime + 25mg at bedtime PRN. Since the last visit:  -Mood has been down, tends to occur this time of year.  -Recently made amends with his mother which has helped relieve a lot of stress/worry. However he is worried about his mother's living situation - she is in a nursing home, feels that she has given up. This was the first time seeing mom in almost 3 years.   -He was seen in the ED last week for tingling, SOB, chest pain - felt somewhat disconnected. Symptoms have improved but lingering SOB. He is scheduled for follow-up with primary care.  -Reports longstanding issues with short term memory - remembering appointment times, movies. This has been longstanding but it's bothersome.   -Taking Seroquel and Prazosin about 5 out of 7 days of the week. Taking Prozac daily (takes this in the morning).   -Denies SI/HI/NSSI.    Medication ASE: Fatigue from Seroquel     Per MT visit with [facility] on 10/29/24:  \"Reviewed impact of missing nighttime medications that lead to poor sleep and contribute to low energy/motivation/mood during the day. Discussed importance of sleep routine in interrupting this cycle and patient developed plan to set an alarm to take bedtime medications at 9:30pm each night. Encouraged him to get a pill box to help with remembering whether he's taken doses, which has sometimes been difficult.  Reviewed potential medications changes and discussed possible benefit of SNRIs that may help neuropathy. Reviewed previous trial with duloxetine that seems to have only lasted 1-2 weeks and likely increased dose after one week. Discussed that we could try starting with a lower dose or not increase as quickly. If hesitancy prevents retrial with duloxetine, could consider Fetzima and determine insurance coverage.  BP has been slightly higher than goal, so would recommend checking BP " "somewhat regularly after starting.  Could also consider vilazodone, though previously was having significant nausea, which may be a limiting side effect from the med.\"    Recent Psych Symptoms:   Depression:  depressed mood, anhedonia, low energy, poor concentration /memory, and feeling worthless. PHQ-9 reviewed.  Elevated:  none  Psychosis:  none  Anxiety:  excessive worry and nervous/overwhelmed  Trauma Related:  intrusive memories, nightmares, flashbacks, and trauma trigger psychological / physiological response  Insomnia:  Yes: Wakes several times per night; energy low during the day, snores  Other:  No    Pertinent Negatives: No suicidal or violent ideation, psychosis, or hypo/otto.      Pertinent Social Hx:  FINANCIAL SUPPORT- Finances obtained through disability (has been on disability for about 4 years)  LIVING SITUATION / RELATIONSHIPS- Lives with his wife and 3 dogs.    SOCIAL/ SPIRITUAL SUPPORT- Wife and adult children. Kids come over every Sunday for family dinner.    Pertinent Substance Use  Alcohol- None; has been sober for at least 10 years. One relapse in the setting of his step-dad's passing.   Nicotine- Chews nicotine gum for the last couple years; previously chewed tobacco  Caffeine- Rare  Opioids- Continuous opioid use  Narcan Kit- Yes  THC/CBD- Prescribed medical cannabis by pain management (gummies).   Other Illicit Drugs- none              Medical Review of Systems   Dizziness/orthostasis- Denies dizziness.  Headaches- Migraines about once per month.   Respiratory- Endorses SOB on exertion; completing a stress test in the near future. Uses Maxalt about once every 2 months.   Cardiovascular- tachycardia, sweating on moderate exertion. At rest denies tachycardia. No chest pain.   Gastrointestinal- Constipation issues; taking docusate. BM almost everyday.   Tics, tremors- denies  Sexual health concerns- ongoing low libido  A comprehensive review of systems was performed and is negative other " "than noted above.              Psych Summary Points  08/2023- Taper down and discontinue liothyronine  10/2023- More depressed. Increased quetiapine from 25 mg at bedtime to 150 mg at bedtime; started melatonin  12/2023- Increased the dose of prazosin to 2 mg at bedtime   4/2024- Had been splitting quetiapine 150 mg into 4 because he was not sure that it was increased. Recommended to take quetiapine 150 mg at bedtime. Reported daytime sedation with quetiapine 150 mg at bedtime. Decreased quetiapine (Seroquel) from 150 mg at bedtime to 100 mg at bedtime and 25-50 mg daily PRN for anxiety  08/2024: Transfer of care visit with Mervat Wylie CNP on 08/30/24. Decreased Seroquel to 25-50mg at bedtime due to morning grogginess from Seroquel 100mg and reduced adherence as a results. Referral for sleep med and MTM placed.   10/2024: MTM visit with [facility] completed; no medication changes.   11/2024: Decreased Seroquel to 25mg nightly + 25mg once daily PRN anxiety/panic. Start light box ~30 minutes daily.              Past Psychotropic Medications  Medication  Dose   (mg) Effect  Dates of Use   Fluoxetine 40-80 Felt like was initially helpful 2016 - 2017 2022-2023   Sertraline 100 Effective initially, eventually self discontinued as not helpful 2018 - 7/2020   Duloxetine 30 BID Used to treat nerve pain; felt weird on it 2017   Bupropion  BID ineffective 2017 - 2019   Nortriptyline 50 For pain 2017 -  3/2019   Mirtazapine   Dissociated for entire first week after taking  Per MTM on 5/15/23: \"\"some kind of psychosis\". States that he was also taking oxycodone, alprazolam, fentanyl patches at the time\" 2012   Trazodone 50 Morning grogginess; didn't like how it made him feel.   2013    Abilify 5mg    2021    Hydroxyzine 25 QID prn For anxiety and panic attacks; not effective for severe anxiety 2015 - 2017   Buspirone 15 TID   2016 - 2017   Gabapentin 100 TID   2013 - 2016   Alprazolam 0.5 TID For anxiety 4913-6682 " "  Diazepam 2 BID For anxiety 2016   Lorazepam 1 TID For anxiety 2016   Quetiapine  25-50   [id number]   Clonazepam 1 For anxiety 2016    Lexapro 20mg  09/2020-03/2021                 Past Medical History     Medication allergies:    Allergies   Allergen Reactions    Mirtazapine      Other reaction(s): Coma    Hydrocodone Itching    Mirtazapine Other (See Comments)     \"Blacked out\" for one week    Ms Contin [Morphine] Itching     Neurologic Hx [head injury, seizures, etc.]: Denies seizures as an adult (alludes to previous seizures as a child; took phenobarbital as a child - thinks this was for migraines), concussion in youth.   Patient Active Problem List   Diagnosis    CARDIOVASCULAR SCREENING; LDL GOAL LESS THAN 160    Obesity, Class I, BMI 30-34.9    Tear meniscus knee, right, initial encounter    Rheumatoid arthritis involving multiple sites, unspecified rheumatoid factor presence    Chronic pain    Right-sided thoracic back pain, unspecified chronicity    Generalized anxiety disorder    Panic attack    Ankylosing spondylitis (H)    Moderate major depression (H)    Immunocompromised (H)    Essential hypertension with goal blood pressure less than 140/90    Suicidal ideation    Major depressive disorder, recurrent episode, severe with mixed features (H)    Chronic back pain greater than 3 months duration    Contact dermatitis and eczema    Dermatitis    Drug dependence (H)    Encounter for screening colonoscopy    Esophageal reflux    Hematuria    Hyperlipidemia    Lumbar radiculopathy    Plantar fascial fibromatosis    Sprain of sacroiliac region    Overweight    Nonintractable migraine    Narcotic abuse, continuous (H)    Morbid obesity (H)    Chronic, continuous use of opioids     Past Medical History:   Diagnosis Date    Ankylosing spondylitis (H) 03/01/2017    Anxiety     Arthritis     back, knees    Back pain 11/17/2013    possible drug seeking behavior in the past.    Gastroesophageal reflux disease     " Hypertension     Overweight (BMI 25.0-29.9) 03/06/2012    Panic attacks     Syncope, unspecified syncope type 02/27/2017    Tobacco use disorder 6/19/2017                 Medications   Current Outpatient Medications   Medication Sig Dispense Refill    atorvastatin (LIPITOR) 20 MG tablet Take 1 tablet (20 mg) by mouth daily. 90 tablet 2    buprenorphine (SUBUTEX) 8 MG SUBL sublingual tablet Place 1 tablet (8 mg) under the tongue 3 times daily. Fill 12/11/2024 start 12/13/24.  30 day script. 90 tablet 0    docusate sodium (COLACE) 100 MG capsule Take 2 capsules (200 mg) by mouth 2 times daily 60 capsule 10    FLUoxetine (PROZAC) 40 MG capsule Take 2 capsules (80 mg) by mouth daily. 180 capsule 0    folic acid (FOLVITE) 1 MG tablet Take 3 tablets (3,000 mcg) by mouth daily. 270 tablet 2    losartan (COZAAR) 25 MG tablet Take 1 tablet (25 mg) by mouth daily 90 tablet 3    medical cannabis (Patient's own supply) See Admin Instructions (The purpose of this order is to document that the patient reports taking medical cannabis.  This is not a prescription, and is not used to certify that the patient has a qualifying medical condition.)     Pills PRN   Lozenges PRN      methotrexate 2.5 MG tablet Take 8 tablets (20 mg) by mouth every 7 days. 96 tablet 3    naloxone (NARCAN) 4 MG/0.1ML nasal spray Spray 1 spray (4 mg) into one nostril alternating nostrils as needed for opioid reversal. every 2-3 minutes until assistance arrives 0.2 mL 1    ondansetron (ZOFRAN ODT) 4 MG ODT tab Take 1 tablet (4 mg) by mouth every 8 hours as needed for nausea 30 tablet 3    oxyCODONE (ROXICODONE) 5 MG tablet Take 1 tablet (5 mg) by mouth every 6 hours as needed for severe pain. Max of 3/day. Fill/begin 11/18/2024 Short term script for acute pain flare. 42 tablet 0    prazosin (MINIPRESS) 2 MG capsule Take 1 capsule (2 mg) by mouth at bedtime. 90 capsule 0    predniSONE (DELTASONE) 5 MG tablet Take 3 tabs daily for 1 week, then take 2 tabs daily  "for 1 week 35 tablet 1    QUEtiapine (SEROQUEL) 25 MG tablet Take 1 tablet (25 mg) by mouth at bedtime. May also take 1 tablet (25 mg) daily as needed (Anxiety). 45 tablet 0    tiZANidine (ZANAFLEX) 4 MG tablet Take 1 tablet (4 mg) by mouth 3 times daily as needed for muscle spasms. 3 month supply 225 tablet 1    tofacitinib (XELJANZ XR) 11 MG 24 hr tablet Take 1 tablet (11 mg) by mouth daily. Hold for signs of infection, then seek medical attention. 90 tablet 2                 Physical Exam  (Vitals Only)  Ht 1.93 m (6' 4\")   Wt 131.5 kg (290 lb)   BMI 35.30 kg/m      Pulse Readings from Last 5 Encounters:   08/02/24 75   06/10/24 (!) 46   05/20/24 52   04/11/24 52   03/01/24 65     Wt Readings from Last 5 Encounters:   01/06/25 131.5 kg (290 lb)   11/25/24 127 kg (280 lb)   08/30/24 133.4 kg (294 lb)   08/02/24 132 kg (291 lb)   06/10/24 128.5 kg (283 lb 3.2 oz)     BP Readings from Last 5 Encounters:   08/02/24 132/88   06/10/24 130/77   05/20/24 134/87   04/11/24 129/87   03/01/24 (!) 140/86                 Mental Status Exam  Alertness: alert  and oriented  Appearance: adequately groomed  Behavior/Demeanor: cooperative, pleasant, and calm, with good eye contact   Speech: normal and regular rate and rhythm  Language: intact and no problems  Psychomotor: normal or unremarkable  Mood: depressed and anxious  Affect: restricted; was congruent to mood  Thought Process/Associations: unremarkable  Thought Content:  Reports none;  Denies suicidal ideation, violent ideation, and delusions  Perception:  Reports none;  Denies auditory hallucinations and visual hallucinations  Insight: intact  Judgment: fair  Cognition: does  appear grossly intact; formal cognitive testing was not done  oriented: time, person, and place  Gait and Station:  N/A ([facility]              Data       4/9/2024     9:30 AM 8/30/2024     9:46 AM 1/6/2025     2:26 PM   PROMIS-10 Total Score w/o Sub Scores   PROMIS TOTAL - SUBSCORES 16 16 15     "    Patient-reported         6/12/2023     8:05 PM 7/12/2023     5:00 PM 11/20/2023     3:06 PM   CAGE-AID Total Score   Total Score 0 0 4   Total Score MyChart 0 (A total score of 2 or greater is considered clinically significant)           10/21/2024     8:53 AM 11/25/2024     1:12 PM 1/6/2025     2:24 PM   PHQ   PHQ-9 Total Score 14 9  12    Q9: Thoughts of better off dead/self-harm past 2 weeks Not at all  Not at all Not at all       Patient-reported    Proxy-reported         7/12/2023     5:00 PM 11/20/2023     3:06 PM 8/30/2024     9:28 AM   JEFF-7 SCORE   Total Score   3 (minimal anxiety)   Total Score 5 3 3         Liver/kidney function Metabolic Blood counts   Recent Labs   Lab Test 08/02/24  1125 03/01/24  1016 05/17/23  1007 12/08/22  1425   CR 1.03 1.02   < > 0.98   AST 32 29   < > 32   ALT 31 29   < > 49   ALKPHOS  --  129  --  112    < > = values in this interval not displayed.    Recent Labs   Lab Test 01/11/24  1106   CHOL 185   TRIG 66   *   HDL 41   A1C 5.5   TSH 2.54    Recent Labs   Lab Test 08/02/24  1125   WBC 8.6   HGB 14.7   HCT 44.1   MCV 90           ECG results from 12/08/22   EKG 12-lead complete w/read - Clinics    Impression    NSR no significant changes from 6/21 study  Ruperto Resendez PA-C       *Note: Due to a large number of results and/or encounters for the requested time period, some results have not been displayed. A complete set of results can be found in Results Review.     Psychiatry Individual Psychotherapy Note   Psychotherapy start time - 4:06 PM  Psychotherapy end time - 4:22 PM  Date treatment plan last reviewed with patient - 08/30/24  Subjective: This supportive psychotherapy session addressed issues related to goals of therapy and current psychosocial stressors. Patient's reaction: Contemplation in the context of mental status appropriate for ambulatory setting.    Interactive complexity indicated? No  Plan: RTC in timeframe noted above  Psychotherapy services  during this visit included myself and the patient.   Treatment Plan      SYMPTOMS; PROBLEMS   MEASURABLE GOALS;    FUNCTIONAL IMPROVEMENT / GAINS INTERVENTIONS DISCHARGE CRITERIA   Depression: depressed mood, anhedonia, low energy, and insomnia   reduce depressive symptoms, find enjoyment at least once a day, learn best practices for sleep, reduce nightmares, stay free of alcohol abuse , and take medications as prescribed on a daily basis Supportive / psychodynamic marked symptom improvement     Administrative Billing:   Level of Medical Decision Making:   - At least 1 chronic problem that is not stable  - Engaged in prescription drug management during visit (discussed any medication benefits, side effects, alternatives, etc.)    The longitudinal plan of care for the diagnosis(es)/condition(s) as documented above were addressed during this visit. Due to the added complexity in care, I will continue to support Les in the subsequent management and with ongoing continuity of care.

## 2025-01-06 NOTE — PATIENT INSTRUCTIONS
Medication Plan  -Continue Seroquel to 25mg nightly + 25mg once daily PRN   -Continue fluoxetine (PROZAC) 80 mg daily  -Continue prazosin (MINIPRESS) 2 mg at bedtime   -Continue melatonin 3 mg 3-4 hours before desired bedtime   -Start SAD Lamp 30 minutes each morning for seasonal mood component (has not yet started using consistently)     **For crisis resources, please see the information at the end of this document**   Patient Education    Thank you for coming to the [facility] [place] [facility].     Lab Testing:  If you had lab testing today and your results are reassuring or normal they will be mailed to you or sent through [employer] within 7 days. If the lab tests need quick action we will call you with the results. The phone number we will call with results is # [phone]. If this is not the best number please call our clinic and change the number.     Medication Refills:  If you need any refills please call your pharmacy and they will contact us. Our fax number for refills is [phone].   Three business days of notice are needed for general medication refill requests.   Five business days of notice are needed for controlled substance refill requests.   If you need to change to a different pharmacy, please contact the new pharmacy directly. The new pharmacy will help you get your medications transferred.     Contact Us:  Please call [phone] during business hours (8-5:00 M-F).   If you have medication related questions after clinic hours, or on the weekend, please call [phone].     Financial Assistance [phone]   Medical Records [phone]       MENTAL HEALTH CRISIS RESOURCES:  For a emergency help, please call 911 or go to the nearest Emergency Department.     Emergency Walk-In Options:   [facility] Unit @ [facility] ([place]): [phone] - Specialized mental health emergency area designed to be calming  [facility] ([place]):  [phone]  [facility] ([place]): [phone]  [facility] [place] ([place]): [phone]    [facility] Crisis Information:   [place]: [phone]  [place]: [phone]  [place] ([place]) - Adult: [phone]     Child: [phone]  [place] - Adult: [phone]     Child: [phone]  [place]: [phone]  List of all [facility] resources:   https://[place].gov/dhs/people-we-serve/adults/health-care/mental-health/resources/crisis-contacts.jsp    National Crisis Information:   Crisis Text Line: Text  [place]  to 056538  Suicide & Crisis Lifeline: 988  National Suicide Prevention Lifeline: 2-448-563-TALK (1-409.274.5139)       For online chat options, visit https://suicidepreventionlifeline.org/chat/  Poison Control Center: 1-850.829.7365  Trans Lifeline: 6-505-323-5099 - Hotline for transgender people of all ages  [employer]: 5-416-252-1524 - Hotline for LGBT youth     For Non-Emergency Support:   Fast Tracker: Mental Health & Substance Use Disorder Resources -   https://www.[employer]n.org/

## 2025-01-08 NOTE — TELEPHONE ENCOUNTER
See [employer] message sent to patient.     Susana GRANT RN CNP, FNP  [facility]

## 2025-01-08 NOTE — TELEPHONE ENCOUNTER
Patient took the 01/21/25 appt   Retention Suture Text: Retention sutures were placed to support the closure and prevent dehiscence.

## 2025-01-14 ENCOUNTER — MYC MEDICAL ADVICE (OUTPATIENT)
Dept: PALLIATIVE MEDICINE | Facility: CLINIC | Age: 60
End: 2025-01-14
Payer: COMMERCIAL

## 2025-01-14 DIAGNOSIS — M05.79 RHEUMATOID ARTHRITIS INVOLVING MULTIPLE SITES WITH POSITIVE RHEUMATOID FACTOR (H): ICD-10-CM

## 2025-01-14 DIAGNOSIS — F11.90 CHRONIC, CONTINUOUS USE OF OPIOIDS: ICD-10-CM

## 2025-01-14 DIAGNOSIS — M54.59 LUMBAR FACET JOINT PAIN: ICD-10-CM

## 2025-01-14 DIAGNOSIS — M25.50 MULTIPLE JOINT PAIN: ICD-10-CM

## 2025-01-14 DIAGNOSIS — M51.360 DEGENERATION OF INTERVERTEBRAL DISC OF LUMBAR REGION WITH DISCOGENIC BACK PAIN: ICD-10-CM

## 2025-01-14 DIAGNOSIS — M47.817 SPONDYLOSIS WITHOUT MYELOPATHY OR RADICULOPATHY, LUMBOSACRAL REGION: ICD-10-CM

## 2025-01-14 DIAGNOSIS — F11.20 UNCOMPLICATED OPIOID DEPENDENCE (H): ICD-10-CM

## 2025-01-14 DIAGNOSIS — M45.7 ANKYLOSING SPONDYLITIS OF LUMBOSACRAL REGION (H): ICD-10-CM

## 2025-01-15 NOTE — TELEPHONE ENCOUNTER
Received request for a refill(s) of buprenorphine (SUBUTEX) 8 MG SUBL sublingual tablet        Last dispensed from pharmacy on 12/11/24    Patient's last office/virtual visit by prescribing provider on 11/18/24  Next office/virtual appointment scheduled for 01/21/25    Last urine drug screen date 05/20/24  Current opioid agreement on file (completed within the last year) Yes Date of opioid agreement: 05/20/24    E-prescribe to pharmacy-[id number] - [place], [place] - [address] [place] [address] AT [facility]     Will route to nursing [place] for review and preparation of prescription(s).

## 2025-01-15 NOTE — TELEPHONE ENCOUNTER
Please process a refill of subutex 8 MG  to     [facility][id number] - [place], [place] - [address] AT [facility]  [address] [place] [address]  Phone: [phone] Fax: [phone]

## 2025-01-16 RX ORDER — BUPRENORPHINE 8 MG/1
8 TABLET SUBLINGUAL 3 TIMES DAILY
Qty: 90 TABLET | Refills: 0 | Status: SHIPPED | OUTPATIENT
Start: 2025-01-16

## 2025-01-16 NOTE — TELEPHONE ENCOUNTER
Medication refill information reviewed.     Due date for  buprenorphine (SUBUTEX) 8 MG SUBL sublingual tablet  is 01/16/25     Prescriptions prepped for review.     Will route to provider.

## 2025-01-16 NOTE — TELEPHONE ENCOUNTER
Signed Prescriptions:                        Disp   Refills    buprenorphine (SUBUTEX) 8 MG SUBL sublingu*90 tab*0        Sig: Place 1 tablet (8 mg) under the tongue 3 times daily.           Fill/start 01/16/25.  30 day script.  Authorizing Provider: SUSANA PETTY        Reviewed [place]  January 16, 2025- no concerning fills.    Susana GRANT, RN CNP, FNP  [facility]
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
## 2025-01-21 ENCOUNTER — VIRTUAL VISIT (OUTPATIENT)
Dept: PALLIATIVE MEDICINE | Facility: CLINIC | Age: 60
End: 2025-01-21
Payer: COMMERCIAL

## 2025-01-21 DIAGNOSIS — M05.79 RHEUMATOID ARTHRITIS INVOLVING MULTIPLE SITES WITH POSITIVE RHEUMATOID FACTOR (H): ICD-10-CM

## 2025-01-21 DIAGNOSIS — M51.360 DEGENERATION OF INTERVERTEBRAL DISC OF LUMBAR REGION WITH DISCOGENIC BACK PAIN: ICD-10-CM

## 2025-01-21 DIAGNOSIS — M45.7 ANKYLOSING SPONDYLITIS OF LUMBOSACRAL REGION (H): ICD-10-CM

## 2025-01-21 DIAGNOSIS — M25.50 MULTIPLE JOINT PAIN: ICD-10-CM

## 2025-01-21 DIAGNOSIS — F11.90 CHRONIC, CONTINUOUS USE OF OPIOIDS: ICD-10-CM

## 2025-01-21 DIAGNOSIS — M47.817 SPONDYLOSIS WITHOUT MYELOPATHY OR RADICULOPATHY, LUMBOSACRAL REGION: ICD-10-CM

## 2025-01-21 DIAGNOSIS — M54.59 LUMBAR FACET JOINT PAIN: Primary | ICD-10-CM

## 2025-01-21 DIAGNOSIS — F11.20 UNCOMPLICATED OPIOID DEPENDENCE (H): ICD-10-CM

## 2025-01-21 PROCEDURE — 98006 SYNCH AUDIO-VIDEO EST MOD 30: CPT | Performed by: NURSE PRACTITIONER

## 2025-01-21 PROCEDURE — G2211 COMPLEX E/M VISIT ADD ON: HCPCS | Performed by: NURSE PRACTITIONER

## 2025-01-21 RX ORDER — BUPRENORPHINE 8 MG/1
8 TABLET SUBLINGUAL 3 TIMES DAILY
Qty: 90 TABLET | Refills: 0 | Status: SHIPPED | OUTPATIENT
Start: 2025-01-21

## 2025-01-21 RX ORDER — OXYCODONE HYDROCHLORIDE 5 MG/1
5 TABLET ORAL EVERY 6 HOURS PRN
Qty: 42 TABLET | Refills: 0 | Status: SHIPPED | OUTPATIENT
Start: 2025-01-21

## 2025-01-21 ASSESSMENT — PAIN SCALES - PAIN ENJOYMENT GENERAL ACTIVITY SCALE (PEG)
PEG_TOTALSCORE: 7.33
INTERFERED_ENJOYMENT_LIFE: 7
PEG_TOTALSCORE: 7.33
AVG_PAIN_PASTWEEK: 8
INTERFERED_GENERAL_ACTIVITY: 7
INTERFERED_ENJOYMENT_LIFE: 7
INTERFERED_GENERAL_ACTIVITY: 7
AVG_PAIN_PASTWEEK: 8

## 2025-01-21 ASSESSMENT — PAIN SCALES - GENERAL: PAINLEVEL_OUTOF10: SEVERE PAIN (8)

## 2025-01-21 NOTE — PROGRESS NOTES
Jailene Breen is a 59 year old who is being evaluated via a billable video visit.      How would you like to obtain your AVS? MyChart  If the video visit is dropped, the invitation should be resent by: Text to cell phone: [phone]  Will anyone else be joining your video visit? No  If patient encounters technical issues they should call [phone]    During this virtual visit the patient is located in [place], patient verifies this as the location during the entirety of this visit.     Video-Visit Details    Type of service:  Video Visit    Video start: 1326  Video end: 1350    Originating Location (pt. Location): Home    Distant Location (provider location):  On-site    Platform used for Video Visit: [facility]      1/21/2025        Chief complaint:   -Bilateral low back pain right > Left. Denies radiation into the legs  -he fell out of bed the end of December 2024 and had requested oxycodone for that.   -notes his mid back pain to the low back. Can go up to the shoulder blade area. He doesn't feel that this is anything new, just intensified.   -Hands, wrists, shoulders, elbows, and bilateral knee pain  -Joint pain is the most bothersome from RA  -seeing new rheumatologist, likes them        Interval history:   Jailene Breen is a 59 year old male is known to me for   Lumbar spondylosis, pain is worse with extension and rotation indicating a facetogenic component to pain  Lumbar degenerative disc disease  SI joint pain  Ankylosing spondylitis  Myofascial pain  Chronic pain syndrome  PMHx includes: Panic attacks, back pain, arthritis, anxiety, ankylosing spondylitis  PSHx includes: Right knee surgery ×2, left foot surgery right knee arthroscopy        Recommendations/plan at the last visit on 11/18/2024 included:  Physical Therapy:  Referral to PHYSICAL THERAPY  Check out Shmuel Murray on YouTube. , easy, gentle exercises 30-90 seconds with  excellent technique  Clinical Health Psychologist: continue work with your psychiatrist and therapist  Diagnostic Studies:  None  Medication Management:    continue Subutex 8mg  three times per day Next script fill 12/11 and start 12/13  Short script of oxycodone max 3/day. #42 fill and start 11/18  Continue medical cannabis  continue tizanidine 2-4mg three times daily as needed for muscle spasms  Further procedures recommended: none  Recommendations to PCP: see above   Follow up:12 weeks in-person/virtual. Please call [phone] to make your follow-up appointment with me.         Since his last visit, Jailene Breen reports:    Interval history January 21, 2025  -he fell out of bed and fell on his back, knocked the wind out of him. This happened at the end of December 2024. Had contacted me re: oxycodone script, had not given this due to no evaluation.   -states his low back pain is the same pain he has always had, just more intense. Still worse with lumbar extension and extension/rotation when loading the facet joints. He has had benefit from lumbar RFA, last done 4/11/2024.   -he does try to move around the house. He has not done any of the moves with Shmuel Murray, this was again discussed.   -will have him repeat lumbar RFA, order placed.    -his wife will be having her other knee replaced likely toward end of February.   -multiple joint pain is also worse with markedly colder weather (today is -40 degrees F)   -went to ER on 1/3/2025 for chest pain, left without being seen as he says that they seemed to blame it on panic disorder without any workup.   -has appt with his Primary Care Provider tomorrow and will discuss the intermittent chest heaviness.   -has a cracked tooth, encouraged to call dentist to get this fixed to avoid possible future infection, patient verbalized understanding.       Interval history November 18, 2024  -had been doing pretty well  -his son just got engaged and he bought a  "house  -Jamison was checking out the home and he slipped on the stairs and  tweaked his back.   -discussed possible use of Tumeric or massage therapy for acute on chronic pain.   -discussed adding in exercise in the \"shorts\" videos by Shmuel Murray online on YouTube  -Jamison has ongoing pain in the following areas:  -Bilateral low back pain right > Left. Denies radiation into the legs  -Hands, wrists, shoulders, elbows, and bilateral knee pain  -Joint pain is the most bothersome from RA  -seeing new rheumatologist, likes them  -slipped on stairs at son's new home and tweaked back    Interval history August 20, 2024  -he has really struggled the last 2 months.  -his bedroom is all torn up as it is leaking water. He is trying to do some of the work himself.   -this is stressful for him as it activity is different than he is used to.   -his back and his hips are bothering him due to increased activity.    -he tries to avoid use of prednisone as it makes him feel more anxious and can lead to panic attacks.   -he has known RA and Ankylosing Spondylitis  -he also has axial low back pain  -he has looked up Shmuel Murray but has not done any of his short exercises.   -he states he is supposed to have a stress test in the future  -notes his weight is getting close to 300 lbs. Discussed referral to comprehensive weight management program, patient interested.     Interval history May 20, 2024  -his bilateral low back pain without radiation has improved a little bit  -pain is worse after using the riding .  -helping his wife, she had knee replacement surgery.   Bilateral low back pain right > Left. Denies radiation into the legs  -Hands, wrists, shoulders, elbows, and bilateral knee pain  -Joint pain is the most bothersome from RA  -seeing new rheumatologist    Interval history February 19, 2024  -he has not been sleeping well.   -he has issues with nausea, he will feel hungry and then when he gets his food he will feel " "nausea and \"feel pukey.\"  -his wife had knee replacement surgery about 2 weeks ago.  -they have a dog that was recently fixed and he has been the one caring for the dog.   -he does use medical cannabis and he does feel that this is somewhat helpful for his nausea and helpful for pain.   -he is not on anything for reducing acid production in his gut, he states that his nausea and vomiting feels very acidic.   -he desires a repeat of lumbar RFA as his low back pain is worse, especially with lumbar extension and ext/rotation to the right and to the left.   -has been transitioned to Xeljanz but has not been able to take it regularly due to not feeling well.          At this point, the patient's participation with our multidisciplinary team includes:  The patient has been compliant with the program.  PT - last PHYSICAL THERAPY with Asif Saldana on 12/23/2020  Health Psych - worked with  Padilla Keene, last on 11/6/2018.  Working with Jonna Farrell PhD and been seen >8 times. Last visit with Santa was on 9/2/2020         Pain scores:    Pain intensity on average is 7-8 on a scale of 0-10.    Range is 4-10/10.   Pain right now is 8/10.   Pain is described as \"sharp, throbbing, stabbing, burning, stiffness.\"  Pain is constant in nature      Current pain relevant medications:      -nortriptyline 50mg at bedtime (helpful)  -ibuprofen 800mg TID PRN (using 3 times daily-helpful)  -Tylenol 500mg using 1000mg TID (unsure if helpful)  -Maxalt 10mg PRN migraine (helpful for migraine--he does have visual aura)  -naloxone nasal spray PRN for accidental opiate overdose  -Subutex 8 mg TID (somewhat helpful)  -medical cannabis PRN (helpful)      Other pertinent medications:   -Fluoxetine 80mg every day (somewhat helpful, managed by psychiatry)  -quetiapine 150mg at bedtime  -Xeljanz 11mg every 24 hours for RA (somewhat helpful)      Previous Medications:   OPIATES: Oxycodone (helpful), Fentanyl (100mcg/hr patch helpful), " hydrocodone (itching), Tramadol (not helpful), Suboxone (somewhat helpful)  NSAIDS: ibuprofen (not helpful), Aleve (not helpful)  MUSCLE RELAXANTS: methocarbamol (somewhat helpful), Flexeril (somewhat helpful but caused severe urinary retention requiring catheterization), tizanidine (unsure if helpful), metaxalone (unsure), SOMA (helpful), chlorzoxazone (helped some)  ANTI-MIGRAINE MEDS: Maxalt (helpful for migraine), Imitrex injection (somewhat helpful)  ANTI-DEPRESSANTS: Prozac (helpful), Cymbalta (felt weird on it), nortriptyline (unsure if helpful), Buspar (unsure), Remeron (blacked out), bupropion (not helpful for smoking cessation)  SLEEP AIDS: Ambien (helpful)  ANTI-CONVULSANTS: gabapentin (felt odd on this medication, unsure of dose), Lyrica (not helpful for 4 months, unsure of dose), Topamax (not helpful, he felt weird on it)   TOPICALS: Lidocaine patches (not helpful), Voltaren gel (not helpful)  Other meds: Tylenol (unsure if helpful)        Other treatments have included:   Jailene JOAQUÍN Breen has been seen at a pain clinic in the past.  [facility]  PT: tried, not helpful  Chiropractic: tried, not helpful  Acupuncture: none  TENs Unit: tried, helpful         Injections:     -has had injections at outside clinics  -has had epidural injections for low back pain (one was helpful)  -he has had lumbar medial branch blocks at [facility] and he was going to have lumbar radiofrequency ablation (did not do this due to cost)  -4/3/2017 right LMBBs with Dr. Ashlyn Gamble (helpful)  -4/26/2017 right LMBBs with Dr. Ashlyn Gamble (helpful)  -5/31/2017 right L3, L4, L5 RFA with Dr. Ashlyn Gamble (good relief for over 6 months)  -3/15/2018 right L5 transforaminal MAKAYLA with Dr. Ashlyn Gamble (not helpful)  -5/10/2018 trigger point injections with Dr. Ashlyn Gamble(somewhat helpful).  -7/12/2018 Right L3, L4, L5 RFA with Dr. Ashlyn Gamble (helpful).  -9/20/2018 Left piriformis injections, bilateral gluteal  "trigger point with Dr. Gamble (helpful).  -1/31/2019 right L2-3, L3-4 and L4-5 facet joint injections with Dr. Ashlyn Gamble (somewhat helpful)  4/4/2019  right L3, L4, L5/sacral ala lumbar radiofrequency ablation with Dr. Gamble (helpful over right side)  6/28/2019 Bilateral facet joint injections at l4-5, L5-S1 with Dr. Holley (only helpful for 7 days)  -2/12/2020 right P8-R0-G4-sacral ALA RFA done with Dr. Ashlyn Gamble (helpful)  -8/26/2020 left L3-5 lumbar medial branch blocks #1 with Dr. Ashlyn Gamble (very helpful)  -11/11/2020 left L3-5 lumbar medial branch blocks #2 with Dr. Ashlyn Gamble  (helpful)   -2/1/2021 bilateral lumbar RFA L4-5 and L5-S1 with Dr. Ashlyn Gamble   (this \"helped a little bit\" and then he tweaked his back about 2 weeks after it was done and it wasn't helpful)  -09/22/2022 lumbosacral RFA L4, L5 and sacral ala with Dr. Aguilar Holley (helpful,, at least 50% relief, he has a RA flare right now, so harder to tell)  -7/20/2023 bilateral lumbar RFA at L4, L5 and Sacral ala with Dr. Aguilar Holley (helpful, has reduced pain by at least 50-60% in the lower back)  -4/11/2024 bilateral lumbar RFA at L4, L5 and S1 by Dr. Aguilar Holley (has reduced pain by at least 50% about 5 weeks post-procedure. Can take up to 8 weeks to reach maximum improvement)      THE 4 A's OF OPIOID MAINTENANCE ANALGESIA    Analgesia: Subutex is helpful    Activity: ADLs, doing some home projects due to water damage. Cares for his dogs    Adverse effects: none    Adherence to Rx protocol: yes          Side Effects: no side effect   Patient is using the medication as prescribed: YES     Medications:  Current Outpatient Medications   Medication Sig Dispense Refill    atorvastatin (LIPITOR) 20 MG tablet Take 1 tablet (20 mg) by mouth daily. 90 tablet 2    buprenorphine (SUBUTEX) 8 MG SUBL sublingual tablet Place 1 tablet (8 mg) under the tongue 3 times daily. Fill/start 01/16/25.  30 day script. 90 tablet 0    " docusate sodium (COLACE) 100 MG capsule Take 2 capsules (200 mg) by mouth 2 times daily 60 capsule 10    FLUoxetine (PROZAC) 40 MG capsule Take 2 capsules (80 mg) by mouth daily. 180 capsule 0    folic acid (FOLVITE) 1 MG tablet Take 3 tablets (3,000 mcg) by mouth daily. 270 tablet 2    losartan (COZAAR) 25 MG tablet Take 1 tablet (25 mg) by mouth daily 90 tablet 3    medical cannabis (Patient's own supply) See Admin Instructions (The purpose of this order is to document that the patient reports taking medical cannabis.  This is not a prescription, and is not used to certify that the patient has a qualifying medical condition.)     Pills PRN   Lozenges PRN      methotrexate 2.5 MG tablet Take 8 tablets (20 mg) by mouth every 7 days. 96 tablet 3    naloxone (NARCAN) 4 MG/0.1ML nasal spray Spray 1 spray (4 mg) into one nostril alternating nostrils as needed for opioid reversal. every 2-3 minutes until assistance arrives 0.2 mL 1    ondansetron (ZOFRAN ODT) 4 MG ODT tab Take 1 tablet (4 mg) by mouth every 8 hours as needed for nausea 30 tablet 3    oxyCODONE (ROXICODONE) 5 MG tablet Take 1 tablet (5 mg) by mouth every 6 hours as needed for severe pain. Max of 3/day. Fill/begin 11/18/2024 Short term script for acute pain flare. 42 tablet 0    prazosin (MINIPRESS) 2 MG capsule Take 1 capsule (2 mg) by mouth at bedtime. 90 capsule 0    predniSONE (DELTASONE) 5 MG tablet Take 3 tabs daily for 1 week, then take 2 tabs daily for 1 week 35 tablet 1    QUEtiapine (SEROQUEL) 25 MG tablet Take 1 tablet (25 mg) by mouth at bedtime. May also take 1 tablet (25 mg) daily as needed (Anxiety). 45 tablet 0    tiZANidine (ZANAFLEX) 4 MG tablet Take 1 tablet (4 mg) by mouth 3 times daily as needed for muscle spasms. 3 month supply 225 tablet 1    tofacitinib (XELJANZ XR) 11 MG 24 hr tablet Take 1 tablet (11 mg) by mouth daily. Hold for signs of infection, then seek medical attention. 90 tablet 2       Medical History: any changes in  medical history since they were last seen? No    Social History:    Home situation: , has 2 grown children  Occupation/Schooling: currently unemployed, worked as a  (unemployed since 3/1/2017). 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  Uses nicotine gum  If yes, was cessation counseling offered?  None needed        Physical Exam:     Vital signs: There were no vitals taken for this visit.     Behavioral observations:  Awake, alert. Cooperative.   Pulm: respirations easy and unlabored. Able to speak in full sentences without SOB or cough noted.             [place] Prescription Monitoring [place]:  [place]ed [place]  1/21/2025- no concerning fills.  Susana GRANT, RN CNP, FNP  [facility]  [facility]          DIRE Score for selecting candidates for long term opioid analgesia for chronic pain:  Diagnosis  2  Intractablility  2  Risk    Psychological health  1    Chemical health  2    Reliability  2    Social support  2  Efficacy  1  Total DIRE Score = 12. Note that  7-13 predicts poor outcome (compliance and efficacy) from opioid prescribing; 14-21 predicts good outcome (compliance and efficacy)  from opioid prescribing        Assessment:    Lumbar facet joint pain  Spondylosis of lumbar region without myelopathy or radiculopathy  Degeneration of intervertebral disc of lumbar region with discogenic back pain  Ankylosing spondylitis of lumbosacral region  Rheumatoid arthritis involving multiple joints with positive rheumatoid factor  Multiple joint pain  Uncomplicated opiate dependence  Chronic continuous use of opioids    Muscle spasm  Myofascial pain  Encounter for long-term use of opiate analgesic  UDT 5/20/2024  Signed CSA 5/20/2024  PMHx includes: Panic  attacks, back pain, arthritis, anxiety, ankylosing spondylitis  PSHx includes: Right knee surgery ×2, left foot surgery right knee arthroscopy          Plan:    Physical Therapy:  none  Check out Shmuel Murray on YouTube. , easy, gentle exercises 30-90 seconds with excellent technique  Clinical Health Psychologist: continue work with your psychiatrist and therapist  Diagnostic Studies:  None  Medication Management:    continue Subutex 8mg  three times per day Next script fill 2/12 and start 2/15  Short script of oxycodone max 3/day. #42 fill and start 1/21/2025  Continue medical cannabis, re-certified today, there is no longer a registration fee  continue tizanidine 2-4mg three times daily as needed for muscle spasms  Further procedures recommended: schedule repeat lumbar RFA, our office will contact you  Recommendations to PCP: see above   Follow up:12 weeks in-person. Please call [phone] to make your follow-up appointment with me.       ASSESSMENT AND PLAN:  (M54.59) Lumbar facet joint pain  (primary encounter diagnosis)  Comment:   Plan: buprenorphine (SUBUTEX) 8 MG SUBL sublingual         tablet, oxyCODONE (ROXICODONE) 5 MG tablet,         Adult Pain Clinic Follow-Up Order, PAIN         INJECTION EVAL/TREAT/FOLLOW UP            (M47.817) Spondylosis without myelopathy or radiculopathy, lumbosacral region  Comment:   Plan: buprenorphine (SUBUTEX) 8 MG SUBL sublingual         tablet, Adult Pain Clinic Follow-Up Order, PAIN        INJECTION EVAL/TREAT/FOLLOW UP            (M51.360) Degeneration of intervertebral disc of lumbar region with discogenic back pain  Comment:   Plan: buprenorphine (SUBUTEX) 8 MG SUBL sublingual         tablet, oxyCODONE (ROXICODONE) 5 MG tablet,         Adult Pain Clinic Follow-Up Order, PAIN         INJECTION EVAL/TREAT/FOLLOW UP            (M45.7) Ankylosing spondylitis of lumbosacral region (H)  Comment:   Plan: buprenorphine (SUBUTEX) 8 MG SUBL sublingual          tablet, oxyCODONE (ROXICODONE) 5 MG tablet            (M05.79) Rheumatoid arthritis involving multiple sites with positive rheumatoid factor (H)  Comment:   Plan: buprenorphine (SUBUTEX) 8 MG SUBL sublingual         tablet, oxyCODONE (ROXICODONE) 5 MG tablet            (M25.50) Multiple joint pain  Comment:   Plan: buprenorphine (SUBUTEX) 8 MG SUBL sublingual         tablet, oxyCODONE (ROXICODONE) 5 MG tablet            (F11.20) Uncomplicated opioid dependence (H)  Comment:   Plan: buprenorphine (SUBUTEX) 8 MG SUBL sublingual         tablet            (F11.90) Chronic, continuous use of opioids  Comment:   Plan: buprenorphine (SUBUTEX) 8 MG SUBL sublingual         tablet, oxyCODONE (ROXICODONE) 5 MG tablet              BILLING TIME DOCUMENTATION:   TOTAL TIME includes:   Time spent preparing to see the patient: 4 minutes (reviewing records and tests)  Time spend face to face with the patient: 24 minutes  Time spent ordering tests, medications, procedures and referrals: 0 minutes  Time spent Referring and communicating with other healthcare professionals: 0 minutes  Documenting clinical information in Epic: 3 minutes    The total TIME spent on this patient on the day of the appointment was 31 minutes.            Susana GRANT, RN CNP, FNP  [facility]  [facility]

## 2025-01-21 NOTE — PATIENT INSTRUCTIONS
Plan:    Physical Therapy:  none  Check out Shmuel Murray on YouTube. , easy, gentle exercises 30-90 seconds with excellent technique  Clinical Health Psychologist: continue work with your psychiatrist and therapist  Diagnostic Studies:  None  Medication Management:    continue Subutex 8mg  three times per day Next script fill 2/12 and start 2/15  Short script of oxycodone max 3/day. #42 fill and start 1/21/2025  Continue medical cannabis, re-certified today, there is no longer a registration fee  continue tizanidine 2-4mg three times daily as needed for muscle spasms  Further procedures recommended: schedule repeat lumbar RFA, our office will contact you  Recommendations to PCP: see above   Follow up:12 weeks in-person. Please call [phone] to make your follow-up appointment with me.     =====================  Clinic Number:  [phone]   Call with any questions about your care and for scheduling assistance.   Calls are returned Monday through Friday between 8 AM and 4:30 PM. We usually get back to you within 2 business days depending on the issue/request.    If we are prescribing your medications:  For opioid medication refills, call the clinic or send a [employer] message 7 days in advance.  Please include:  Name of requested medication  Name of the pharmacy.  For non-opioid medications, call your pharmacy directly to request a refill. Please allow 3-4 days to be processed.   Per [place] State Law:  All controlled substance prescriptions must be filled within 30 days of being written.    For those controlled substances allowing refills, pickup must occur within 30 days of last fill.      We believe regular attendance is key to your success in our program!    Any time you are unable to keep your appointment we ask that you call us at least 24 hours in advance to cancel.This will allow us to offer the appointment time to another patient.   Multiple missed appointments may lead to dismissal from the  clinic.

## 2025-01-22 ENCOUNTER — ORDERS ONLY (AUTO-RELEASED) (OUTPATIENT)
Dept: FAMILY MEDICINE | Facility: CLINIC | Age: 60
End: 2025-01-22

## 2025-01-22 ENCOUNTER — OFFICE VISIT (OUTPATIENT)
Dept: FAMILY MEDICINE | Facility: CLINIC | Age: 60
End: 2025-01-22
Payer: COMMERCIAL

## 2025-01-22 VITALS
HEART RATE: 62 BPM | BODY MASS INDEX: 36.68 KG/M2 | RESPIRATION RATE: 16 BRPM | DIASTOLIC BLOOD PRESSURE: 82 MMHG | WEIGHT: 295 LBS | OXYGEN SATURATION: 97 % | TEMPERATURE: 97.4 F | SYSTOLIC BLOOD PRESSURE: 127 MMHG | HEIGHT: 75 IN

## 2025-01-22 DIAGNOSIS — R06.09 DYSPNEA ON EXERTION: ICD-10-CM

## 2025-01-22 DIAGNOSIS — I10 ESSENTIAL HYPERTENSION WITH GOAL BLOOD PRESSURE LESS THAN 140/90: ICD-10-CM

## 2025-01-22 DIAGNOSIS — E78.2 MIXED HYPERLIPIDEMIA: Primary | ICD-10-CM

## 2025-01-22 DIAGNOSIS — K59.03 DRUG-INDUCED CONSTIPATION: ICD-10-CM

## 2025-01-22 DIAGNOSIS — Z23 NEED FOR PROPHYLACTIC VACCINATION AGAINST HEPATITIS B VIRUS: ICD-10-CM

## 2025-01-22 DIAGNOSIS — F33.2 MAJOR DEPRESSIVE DISORDER, RECURRENT EPISODE, SEVERE WITH MIXED FEATURES (H): ICD-10-CM

## 2025-01-22 DIAGNOSIS — M06.9 RHEUMATOID ARTHRITIS INVOLVING MULTIPLE SITES, UNSPECIFIED WHETHER RHEUMATOID FACTOR PRESENT (H): ICD-10-CM

## 2025-01-22 DIAGNOSIS — G89.4 CHRONIC PAIN SYNDROME: ICD-10-CM

## 2025-01-22 DIAGNOSIS — M45.8 ANKYLOSING SPONDYLITIS OF SACRAL REGION (H): ICD-10-CM

## 2025-01-22 DIAGNOSIS — E83.51 HYPOCALCEMIA: ICD-10-CM

## 2025-01-22 DIAGNOSIS — M54.16 LUMBAR RADICULOPATHY: ICD-10-CM

## 2025-01-22 DIAGNOSIS — M45.7 ANKYLOSING SPONDYLITIS OF LUMBOSACRAL REGION (H): ICD-10-CM

## 2025-01-22 DIAGNOSIS — E66.01 MORBID OBESITY (H): ICD-10-CM

## 2025-01-22 DIAGNOSIS — Z00.00 ENCOUNTER FOR MEDICARE ANNUAL WELLNESS EXAM: ICD-10-CM

## 2025-01-22 DIAGNOSIS — F41.1 GENERALIZED ANXIETY DISORDER: ICD-10-CM

## 2025-01-22 DIAGNOSIS — R10.13 EPIGASTRIC PAIN: ICD-10-CM

## 2025-01-22 DIAGNOSIS — R07.9 CHEST PAIN, UNSPECIFIED TYPE: ICD-10-CM

## 2025-01-22 DIAGNOSIS — Z23 NEED FOR PROPHYLACTIC VACCINATION AGAINST HEPATITIS A: ICD-10-CM

## 2025-01-22 LAB
ALBUMIN SERPL BCG-MCNC: 4.3 G/DL (ref 3.5–5.2)
ALT SERPL W P-5'-P-CCNC: 36 U/L (ref 0–70)
AST SERPL W P-5'-P-CCNC: 33 U/L (ref 0–45)
CALCIUM SERPL-MCNC: 9.1 MG/DL (ref 8.8–10.4)
CHOLEST SERPL-MCNC: 200 MG/DL
CREAT SERPL-MCNC: 1.2 MG/DL (ref 0.67–1.17)
CRP SERPL-MCNC: <3 MG/L
EGFRCR SERPLBLD CKD-EPI 2021: 71 ML/MIN/1.73M2
ERYTHROCYTE [DISTWIDTH] IN BLOOD BY AUTOMATED COUNT: 13.4 % (ref 10–15)
ERYTHROCYTE [SEDIMENTATION RATE] IN BLOOD BY WESTERGREN METHOD: 14 MM/HR (ref 0–20)
EST. AVERAGE GLUCOSE BLD GHB EST-MCNC: 120 MG/DL
FASTING STATUS PATIENT QL REPORTED: YES
HBA1C MFR BLD: 5.8 % (ref 0–5.6)
HCT VFR BLD AUTO: 42.3 % (ref 40–53)
HDLC SERPL-MCNC: 36 MG/DL
HGB BLD-MCNC: 14.3 G/DL (ref 13.3–17.7)
LDLC SERPL CALC-MCNC: 142 MG/DL
LIPASE SERPL-CCNC: 16 U/L (ref 13–60)
MAGNESIUM SERPL-MCNC: 2.2 MG/DL (ref 1.7–2.3)
MCH RBC QN AUTO: 29.9 PG (ref 26.5–33)
MCHC RBC AUTO-ENTMCNC: 33.8 G/DL (ref 31.5–36.5)
MCV RBC AUTO: 88 FL (ref 78–100)
NONHDLC SERPL-MCNC: 164 MG/DL
PHOSPHATE SERPL-MCNC: 3.3 MG/DL (ref 2.5–4.5)
PLATELET # BLD AUTO: 238 10E3/UL (ref 150–450)
RBC # BLD AUTO: 4.79 10E6/UL (ref 4.4–5.9)
TRIGL SERPL-MCNC: 111 MG/DL
VIT D+METAB SERPL-MCNC: 19 NG/ML (ref 20–50)
WBC # BLD AUTO: 8.1 10E3/UL (ref 4–11)

## 2025-01-22 PROCEDURE — 85652 RBC SED RATE AUTOMATED: CPT

## 2025-01-22 PROCEDURE — 85027 COMPLETE CBC AUTOMATED: CPT

## 2025-01-22 PROCEDURE — G2211 COMPLEX E/M VISIT ADD ON: HCPCS

## 2025-01-22 PROCEDURE — 80061 LIPID PANEL: CPT

## 2025-01-22 PROCEDURE — 84100 ASSAY OF PHOSPHORUS: CPT

## 2025-01-22 PROCEDURE — G0439 PPPS, SUBSEQ VISIT: HCPCS

## 2025-01-22 PROCEDURE — 91320 SARSCV2 VAC 30MCG TRS-SUC IM: CPT

## 2025-01-22 PROCEDURE — 83036 HEMOGLOBIN GLYCOSYLATED A1C: CPT | Mod: GZ

## 2025-01-22 PROCEDURE — 82306 VITAMIN D 25 HYDROXY: CPT

## 2025-01-22 PROCEDURE — G0008 ADMIN INFLUENZA VIRUS VAC: HCPCS

## 2025-01-22 PROCEDURE — 99214 OFFICE O/P EST MOD 30 MIN: CPT | Mod: 25

## 2025-01-22 PROCEDURE — 82310 ASSAY OF CALCIUM: CPT

## 2025-01-22 PROCEDURE — 83970 ASSAY OF PARATHORMONE: CPT

## 2025-01-22 PROCEDURE — 90673 RIV3 VACCINE NO PRESERV IM: CPT

## 2025-01-22 PROCEDURE — 82565 ASSAY OF CREATININE: CPT

## 2025-01-22 PROCEDURE — 90480 ADMN SARSCOV2 VAC 1/ONLY CMP: CPT

## 2025-01-22 PROCEDURE — 86140 C-REACTIVE PROTEIN: CPT

## 2025-01-22 PROCEDURE — 84460 ALANINE AMINO (ALT) (SGPT): CPT

## 2025-01-22 PROCEDURE — 84450 TRANSFERASE (AST) (SGOT): CPT

## 2025-01-22 PROCEDURE — 83690 ASSAY OF LIPASE: CPT

## 2025-01-22 PROCEDURE — 83735 ASSAY OF MAGNESIUM: CPT

## 2025-01-22 PROCEDURE — 82040 ASSAY OF SERUM ALBUMIN: CPT

## 2025-01-22 RX ORDER — POLYETHYLENE GLYCOL 3350 17 G/17G
1 POWDER, FOR SOLUTION ORAL DAILY
Qty: 510 G | Refills: 3 | Status: SHIPPED | OUTPATIENT
Start: 2025-01-22

## 2025-01-22 SDOH — HEALTH STABILITY: PHYSICAL HEALTH: ON AVERAGE, HOW MANY MINUTES DO YOU ENGAGE IN EXERCISE AT THIS LEVEL?: 20 MIN

## 2025-01-22 SDOH — HEALTH STABILITY: PHYSICAL HEALTH: ON AVERAGE, HOW MANY DAYS PER WEEK DO YOU ENGAGE IN MODERATE TO STRENUOUS EXERCISE (LIKE A BRISK WALK)?: 3 DAYS

## 2025-01-22 ASSESSMENT — SOCIAL DETERMINANTS OF HEALTH (SDOH): HOW OFTEN DO YOU GET TOGETHER WITH FRIENDS OR RELATIVES?: TWICE A WEEK

## 2025-01-22 ASSESSMENT — PATIENT HEALTH QUESTIONNAIRE - PHQ9
SUM OF ALL RESPONSES TO PHQ QUESTIONS 1-9: 10
10. IF YOU CHECKED OFF ANY PROBLEMS, HOW DIFFICULT HAVE THESE PROBLEMS MADE IT FOR YOU TO DO YOUR WORK, TAKE CARE OF THINGS AT HOME, OR GET ALONG WITH OTHER PEOPLE: VERY DIFFICULT
SUM OF ALL RESPONSES TO PHQ QUESTIONS 1-9: 10

## 2025-01-22 ASSESSMENT — PAIN SCALES - GENERAL: PAINLEVEL_OUTOF10: SEVERE PAIN (8)

## 2025-01-22 NOTE — PATIENT INSTRUCTIONS
Patient Education   Preventive Care Advice   This is general advice given by our system to help you stay healthy. However, your care team may have specific advice just for you. Please talk to your care team about your preventive care needs.  Nutrition  Eat 5 or more servings of fruits and vegetables each day.  Try wheat bread, brown rice and whole grain pasta (instead of white bread, rice, and pasta).  Get enough calcium and vitamin D. Check the label on foods and aim for 100% of the RDA (recommended daily allowance).  Lifestyle  Exercise at least 150 minutes each week  (30 minutes a day, 5 days a week).  Do muscle strengthening activities 2 days a week. These help control your weight and prevent disease.  No smoking.  Wear sunscreen to prevent skin cancer.  Have a dental exam and cleaning every 6 months.  Yearly exams  See your health care team every year to talk about:  Any changes in your health.  Any medicines your care team has prescribed.  Preventive care, family planning, and ways to prevent chronic diseases.  Shots (vaccines)   HPV shots (up to age 26), if you've never had them before.  Hepatitis B shots (up to age 59), if you've never had them before.  COVID-19 shot: Get this shot when it's due.  Flu shot: Get a flu shot every year.  Tetanus shot: Get a tetanus shot every 10 years.  Pneumococcal, hepatitis A, and RSV shots: Ask your care team if you need these based on your risk.  Shingles shot (for age 50 and up)  General health tests  Diabetes screening:  Starting at age 35, Get screened for diabetes at least every 3 years.  If you are younger than age 35, ask your care team if you should be screened for diabetes.  Cholesterol test: At age 39, start having a cholesterol test every 5 years, or more often if advised.  Bone density scan (DEXA): At age 50, ask your care team if you should have this scan for osteoporosis (brittle bones).  Hepatitis C: Get tested at least once in your life.  STIs (sexually  transmitted infections)  Before age 24: Ask your care team if you should be screened for STIs.  After age 24: Get screened for STIs if you're at risk. You are at risk for STIs (including HIV) if:  You are sexually active with more than one person.  You don't use condoms every time.  You or a partner was diagnosed with a sexually transmitted infection.  If you are at risk for HIV, ask about PrEP medicine to prevent HIV.  Get tested for HIV at least once in your life, whether you are at risk for HIV or not.  Cancer screening tests  Cervical cancer screening: If you have a cervix, begin getting regular cervical cancer screening tests starting at age 21.  Breast cancer scan (mammogram): If you've ever had breasts, begin having regular mammograms starting at age 40. This is a scan to check for breast cancer.  Colon cancer screening: It is important to start screening for colon cancer at age 45.  Have a colonoscopy test every 10 years (or more often if you're at risk) Or, ask your provider about stool tests like a FIT test every year or Cologuard test every 3 years.  To learn more about your testing options, visit:   .  For help making a decision, visit:   https://bit.ly/cp36253.  Prostate cancer screening test: If you have a prostate, ask your care team if a prostate cancer screening test (PSA) at age 55 is right for you.  Lung cancer screening: If you are a current or former smoker ages 50 to 80, ask your care team if ongoing lung cancer screenings are right for you.  For informational purposes only. Not to replace the advice of your health care provider. Copyright   2023 [facility]. All rights reserved. Clinically reviewed by the [facility] Transitions Program. [employer] 247919 - REV 01/24.  Learning About Depression Screening  What is depression screening?  Depression screening is a way to see if you have depression symptoms. It may be done by a doctor or counselor. It's often part of a routine  "checkup. That's because your mental health is just as important as your physical health.  Depression is a mental health condition that affects how you feel, think, and act. You may:  Have less energy.  Lose interest in your daily activities.  Feel sad and grouchy for a long time.  Depression is very common. It affects people of all ages.  Many things can lead to depression. Some people become depressed after they have a stroke or find out they have a major illness like cancer or heart disease. The death of a loved one or a breakup may lead to depression. It can run in families. Most experts believe that a combination of inherited genes and stressful life events can cause it.  What happens during screening?  You may be asked to fill out a form about your depression symptoms. You and the doctor will discuss your answers. The doctor may ask you more questions to learn more about how you think, act, and feel.  What happens after screening?  If you have symptoms of depression, your doctor will talk to you about your options.  Doctors usually treat depression with medicines or counseling. Often, combining the two works best. Many people don't get help because they think that they'll get over the depression on their own. But people with depression may not get better unless they get treatment.  The cause of depression is not well understood. There may be many factors involved. But if you have depression, it's not your fault.  A serious symptom of depression is thinking about death or suicide. If you or someone you care about talks about this or about feeling hopeless, get help right away.  It's important to know that depression can be treated. Medicine, counseling, and self-care may help.  Where can you learn more?  Go to https://www.[employer].net/patiented  Enter T185 in the search box to learn more about \"Learning About Depression Screening.\"  Current as of: July 31, 2024  Content Version: 14.3    2024 [employer].   Care instructions adapted under license by your healthcare professional. If you have questions about a medical condition or this instruction, always ask your healthcare professional. [employer] disclaims any warranty or liability for your use of this information.     At [facility], we strive to deliver an exceptional experience to you, every time we see you. If you receive a survey, please let us know what we are doing well and/or what we could improve upon, as we do value your feedback.  If you have MyChart, you can expect to receive results automatically within 24 hours of their completion.  Your provider will send a note interpreting your results as well.   If you do not have MyChart, you should receive your results in about a week by mail.    Your care team:                            Family Medicine Internal Medicine   MD Paul Coy, MD Inocencio Collazo, MD Katarina London, HERIBERTO Swan, MD Pediatrics   Nicolette Taylor, MD Mayra Mcmahon, MD Phoebe Jones, APRN CNP Dottie Morales APRN CNP   MD Paula Rubio, MD Minda Monzon, CNP     Axel Dillon, CNP Same-Day Provider (No follow-up visits)   JUANITA Garcia, DNP HERIBERTO Woods APRN, FNP, BC Annalee Desai PA-C     Clinic hours: Monday - Thursday 7 am-6 pm; Fridays 7 am-5 pm.   Urgent care: Monday - Friday 10 am- 8 pm; Saturday and Sunday 9 am-5 pm.    Clinic: [phone]       [facility]: Monday - Thursday 8 am - 7 pm; Friday 8 am - 6 pm  [facility]: [phone]

## 2025-01-22 NOTE — PROGRESS NOTES
"Preventive Care Visit  [facility] JUANITA Cortez CNP, Family Medicine  Jan 22, 2025    Assessment & Plan     Encounter for Medicare annual wellness exam  ***  - Hemoglobin A1c    Mixed hyperlipidemia  ***  - Lipid panel reflex to direct LDL Fasting    Morbid obesity (H)  ***    Hypocalcemia  ***  - Calcium  - Parathyroid Hormone Intact  - Vitamin D deficiency screening  - Creatinine  - Magnesium  - Phosphorus    Lumbar radiculopathy  ***    Chronic pain syndrome  ***    Essential hypertension with goal blood pressure less than 140/90  ***    Ankylosing spondylitis of lumbosacral region (H)  ***    Rheumatoid arthritis involving multiple sites, unspecified whether rheumatoid factor present (H)  ***    Generalized anxiety disorder  ***    Major depressive disorder, recurrent episode, severe with mixed features (H)  ***    Need for prophylactic vaccination against hepatitis A  ***  - hepatitis A vaccine (VAQTA) 50 UNIT/ML injection  Dispense: 1 mL; Refill: 0    Need for prophylactic vaccination against hepatitis B virus  ***  - hepatitis b vaccine recombinant (RECOMBIVAX-HB) 10 MCG/ML injection  Dispense: 1 mL; Refill: 0    Chest pain, unspecified type  ***  - ZIO PATCH MAIL OUT  - CT Coronary Calcium Scan    Dyspnea on exertion  ***    Drug-induced constipation  ***  - polyethylene glycol (MIRALAX) 17 GM/Dose powder  Dispense: 510 g; Refill: 3    Patient has been advised of split billing requirements and indicates understanding: Yes    BMI  Estimated body mass index is 36.83 kg/m  as calculated from the following:    Height as of this encounter: 1.906 m (6' 3.04\").    Weight as of this encounter: 133.8 kg (295 lb).   Weight management plan: Discussed healthy diet and exercise guidelines    Counseling  Appropriate preventive services were addressed with this patient via screening, questionnaire, or discussion as appropriate for fall prevention, nutrition, physical activity, " "Tobacco-use cessation, social engagement, weight loss and cognition.  Checklist reviewing preventive services available has been given to the patient.  Reviewed patient's diet, addressing concerns and/or questions.   He is at risk for lack of exercise and has been provided with information to increase physical activity for the benefit of his well-being.   The patient was instructed to see the dentist every 6 months.   He is at risk for psychosocial distress and has been provided with information to reduce risk.   Discussed possible causes of fatigue. The patient was provided with written information regarding signs of hearing loss.   The patient's PHQ-9 score is consistent with moderate depression. He was provided with information regarding depression.   I have reviewed Opioid Use Disorder and Substance Use Disorder risk factors and made any needed referrals. I have reviewed the current pain treatment plan including non-opioid treatment options.    Due for annual physical in one year, otherwise follow up on as-needed basis.    Nahun Swift is a 59 year old, presenting for the following:  Physical        1/22/2025     8:07 AM   Additional Questions   Roomed by Miranda BIANCHI  Presented to ER with chest heaviness and tightness radiating down left arm. Woke up feeling \"out of it.\"     Health Care Directive  Patient does not have a Health Care Directive: {ADVANCE_DIRECTIVE_STATUS:504717}        1/22/2025   General Health   How would you rate your overall physical health? (!) POOR   Feel stress (tense, anxious, or unable to sleep) Rather much   (!) STRESS CONCERN      1/22/2025   Nutrition   Diet: Regular (no restrictions)         1/22/2025   Exercise   Days per week of moderate/strenous exercise 3 days   Average minutes spent exercising at this level 20 min         1/22/2025   Social Factors   Frequency of gathering with friends or relatives Twice a week   Worry food won't last until get money to buy more No   Food " not last or not have enough money for food? No   Do you have housing? (Housing is defined as stable permanent housing and does not include staying ouside in a car, in a tent, in an abandoned building, in an overnight shelter, or couch-surfing.) Yes   Are you worried about losing your housing? No   Lack of transportation? No   Unable to get utilities (heat,electricity)? No         1/22/2025   Fall Risk   Fallen 2 or more times in the past year? Yes   Trouble with walking or balance? No   Gait Speed Test (Document in seconds) 5.22   Gait Speed Test Interpretation Greater than 5.01 seconds - ABNORMAL          1/22/2025   Activities of Daily Living- Home Safety   Needs help with the following daily activites None of the above   Safety concerns in the home None of the above         1/22/2025   Dental   Dentist two times every year? (!) NO         1/22/2025   Hearing Screening   Hearing concerns? (!) I FEEL THAT PEOPLE ARE MUMBLING OR NOT SPEAKING CLEARLY.    (!) I NEED TO ASK PEOPLE TO SPEAK UP OR REPEAT THEMSELVES.    (!) IT'S HARD TO FOLLOW A CONVERSATION IN A NOISY RESTAURANT OR CROWDED ROOM.    (!) TROUBLE UNDERSTANDING SOFT OR WHISPERED SPEECH.       Multiple values from one day are sorted in reverse-chronological order         1/22/2025   Driving Risk Screening   Patient/family members have concerns about driving No         1/22/2025   General Alertness/Fatigue Screening   Have you been more tired than usual lately? (!) YES         1/22/2025   Urinary Incontinence Screening   Bothered by leaking urine in past 6 months No     Today's PHQ-9 Score:       1/22/2025     8:03 AM   PHQ-9 SCORE   PHQ-9 Total Score MyChart 10 (Moderate depression)   PHQ-9 Total Score 10        Patient-reported         1/22/2025   Substance Use   Alcohol more than 3/day or more than 7/wk Not Applicable   Do you have a current opioid prescription? (!) YES   How severe/bad is pain from 1 to 10? 8/10   Do you use any other substances  recreationally? No         3/8/2024     8:29 AM   Opioid Risk Tool   Alcohol 0   Illegal Drugs 0   Prescrition Drugs 0   Alcohol 0   Illegal Drugs 0   Prescription Drugs 0   Is the patient age 16-45 0   Psychological Disease: Attention-Deficit/Hyperactivity Disorder; Obsessive Compulsive Disorder; Bipolar Disorder; Schizophrenia 0   Depression 1   Total Score 1     Low Risk (0-3)  Moderate Risk (4-7)  High Risk (>8)  Social History     Tobacco Use    Smoking status: Never     Passive exposure: Never    Smokeless tobacco: Former     Types: Chew     Quit date: 12/12/2019    Tobacco comments:     still chews nicotine gum   Vaping Use    Vaping status: Never Used   Substance Use Topics    Alcohol use: No     Comment: none    Drug use: Yes     Frequency: 7.0 times per week     Types: Marijuana     Comment: medicinal (oral)     Last PSA:   PSA   Date Value Ref Range Status   07/01/2016 0.37 0 - 4 ug/L Final     ASCVD Risk   The 10-year ASCVD risk score (Chauncey BANDA, et al., 2019) is: 8.3%    Values used to calculate the score:      Age: 59 years      Sex: Male      Is Non- : No      Diabetic: No      Tobacco smoker: No      Systolic Blood Pressure: 127 mmHg      Is BP treated: Yes      HDL Cholesterol: 41 mg/dL      Total Cholesterol: 185 mg/dL    Reviewed and updated as needed this visit by Provider   Tobacco   Meds  Problems  Med Hx  Surg Hx  Fam Hx  Soc Hx Sexual   Activity        Current providers sharing in care for this patient include:  Patient Care Team:  Minda Monzon APRN CNP as PCP - General (Family Medicine)  Doretha Magallanes MD as MD (Anesthesiology)  Asif Luu DO as MD (Family Medicine - Sports Medicine)  Fredo Hughes MD as MD (Orthopedics)  Barbie Christie, PhD LP as Psychologist (Licensed Mental Health)  Susana Pike APRN CNP as Referring Physician (Nurse Practitioner)  Raghu Parada MD as Assigned Rheumatology  Provider  Susana Pike APRN CNP as Assigned Neuroscience Provider  Susana Pike APRN CNP as Assigned Pain Medication Provider  Caitlin Cohen, PharmD as Assigned MTM Pharmacist  Leti Quiroz AuD as Audiologist (Audiology)  Daniel Plasenica MD as MD (Otolaryngology)  Zain Hernández MD as Resident (Student in organized health care education/training program)  Haim Quezada PsyD as Assigned Behavioral Health Provider  Daniel Plasencia MD as Assigned Surgical Provider  Minda Monzon APRN CNP as Assigned PCP  Juan Novak MD as MD (Rheumatology)    The following health maintenance items are reviewed in Epic and correct as of today:  Health Maintenance   Topic Date Due    HEPATITIS A IMMUNIZATION (1 of 2 - Risk 2-dose series) Never done    HEPATITIS B IMMUNIZATION (1 of 3 - 19+ 3-dose series) Never done    INFLUENZA VACCINE (1) 09/01/2024    COVID-19 Vaccine (5 - 2024-25 season) 09/01/2024    LIPID  01/11/2025    ANNUAL REVIEW OF HM ORDERS  03/01/2025    HEPATIC PANEL  03/01/2025    URINE DRUG SCREEN  05/20/2025    CONTROLLED SUBSTANCE AGREEMENT FOR CHRONIC PAIN MANAGEMENT  05/20/2025    BMP  08/02/2025    JEFF ASSESSMENT  08/30/2025    MEDICARE ANNUAL WELLNESS VISIT  01/22/2026    PHQ-9  01/22/2026    GLUCOSE  08/02/2027    COLORECTAL CANCER SCREENING  05/09/2028    ADVANCE CARE PLANNING  03/01/2029    DTAP/TDAP/TD IMMUNIZATION (2 - Td or Tdap) 09/12/2031    RSV VACCINE (1 - 1-dose 75+ series) 10/06/2042    HEPATITIS C SCREENING  Completed    HIV SCREENING  Completed    DEPRESSION ACTION PLAN  Completed    Pneumococcal Vaccine: 50+ Years  Completed    ZOSTER IMMUNIZATION  Completed    HPV IMMUNIZATION  Aged Out    MENINGITIS IMMUNIZATION  Aged Out    RSV MONOCLONAL ANTIBODY  Aged Out    TSH W/FREE T4 REFLEX  Discontinued    LUNG CANCER SCREENING  Discontinued     Review of Systems  Constitutional, HEENT, cardiovascular, pulmonary, gi and gu systems are negative, except  "as otherwise noted.     Objective    Exam  BP (!) 127/82 (BP Location: Left arm, Patient Position: Sitting, Cuff Size: Adult Large)   Pulse (!) 62   Temp 97.4  F (36.3  C) (Temporal)   Resp (!) 16   Ht 1.906 m (6' 3.04\")   Wt 133.8 kg (295 lb)   SpO2 97%   BMI 36.83 kg/m     Estimated body mass index is 36.83 kg/m  as calculated from the following:    Height as of this encounter: 1.906 m (6' 3.04\").    Weight as of this encounter: 133.8 kg (295 lb).    Physical Exam  GENERAL: alert and no distress  EYES: Eyes grossly normal to inspection, PERRL and conjunctivae and sclerae normal  HENT: ear canals and TM's normal, nose and mouth without ulcers or lesions  NECK: no adenopathy, no asymmetry, masses, or scars  RESP: lungs clear to auscultation - no rales, rhonchi or wheezes  CV: regular rate and rhythm, normal S1 S2, no S3 or S4, no murmur, click or rub, no peripheral edema  ABDOMEN: tenderness epigastric and bowel sounds normal  MS: no gross musculoskeletal defects noted, no edema  SKIN: ***  NEURO: Normal strength and tone, mentation intact and speech normal  PSYCH: mentation appears normal, affect normal/bright        1/22/2025   Mini Cog   Clock Draw Score 2 Normal   3 Item Recall 2 objects recalled   Mini Cog Total Score 4     Signed Electronically by: JUANITA Curran CNP    Answers submitted by the patient for this visit:  Patient Health Questionnaire (Submitted on 1/22/2025)  If you checked off any problems, how difficult have these problems made it for you to do your work, take care of things at home, or get along with other people?: Very difficult  PHQ9 TOTAL SCORE: 10    " "Location: Left arm, Patient Position: Sitting, Cuff Size: Adult Large)   Pulse (!) 62   Temp 97.4  F (36.3  C) (Temporal)   Resp (!) 16   Ht 1.906 m (6' 3.04\")   Wt 133.8 kg (295 lb)   SpO2 97%   BMI 36.83 kg/m     Estimated body mass index is 36.83 kg/m  as calculated from the following:    Height as of this encounter: 1.906 m (6' 3.04\").    Weight as of this encounter: 133.8 kg (295 lb).    Physical Exam  GENERAL: alert and no distress  EYES: Eyes grossly normal to inspection, PERRL and conjunctivae and sclerae normal  HENT: ear canals and TM's normal, nose and mouth without ulcers or lesions  NECK: no adenopathy, no asymmetry, masses, or scars  RESP: lungs clear to auscultation - no rales, rhonchi or wheezes  CV: regular rate and rhythm, normal S1 S2, no S3 or S4, no murmur, click or rub, no peripheral edema  ABDOMEN: tenderness epigastric and bowel sounds normal  MS: no gross musculoskeletal defects noted, no edema  SKIN: Scattered psoriatic patches  NEURO: Normal strength and tone, mentation intact and speech normal  PSYCH: mentation appears normal, affect normal/bright        1/22/2025   Mini Cog   Clock Draw Score 2 Normal   3 Item Recall 2 objects recalled   Mini Cog Total Score 4     Signed Electronically by: JUANITA Curran CNP    Answers submitted by the patient for this visit:  Patient Health Questionnaire (Submitted on 1/22/2025)  If you checked off any problems, how difficult have these problems made it for you to do your work, take care of things at home, or get along with other people?: Very difficult  PHQ9 TOTAL SCORE: 10    "

## 2025-01-23 ENCOUNTER — TELEPHONE (OUTPATIENT)
Dept: PALLIATIVE MEDICINE | Facility: CLINIC | Age: 60
End: 2025-01-23

## 2025-01-23 DIAGNOSIS — M47.816 SPONDYLOSIS OF LUMBAR REGION WITHOUT MYELOPATHY OR RADICULOPATHY: Primary | ICD-10-CM

## 2025-01-23 LAB — PTH-INTACT SERPL-MCNC: 75 PG/ML (ref 15–65)

## 2025-01-23 NOTE — TELEPHONE ENCOUNTER
repeat lumbar RFA bilaterally at L4, L5 and sacral ala.               Pj Donovan  Complex   [facility]  Pain Management

## 2025-01-23 NOTE — TELEPHONE ENCOUNTER
Was able to run new test claim and Les is eligible for FPAP. Co-pay=$1,972.67  Emailed Les to see if heard from [employer] or if would like to try for FPAP instead.

## 2025-01-24 NOTE — TELEPHONE ENCOUNTER
Coverage Guidance-No PA required    Coverage Indications, Limitations, and/or Medical Necessity      C. Facet Joint Denervation:    The thermal radiofrequency destruction of cervical, thoracic, or lumbar paravertebral facet joint (medial branch) nerves are considered medically reasonable and necessary for patients who meet ALL the following criteria:    Repeat thermal facet joint RFA at the same anatomic site is considered medically reasonable and necessary provided the patient had a minimum of consistent 50% improvement in pain for at least six (6) months or at least 50% consistent improvement in the ability to perform previously painful movements and ADLs as compared to baseline measurement using the same scale;  Frequency Limitation: For each covered spinal region no more than two (2) radiofrequency sessions will be reimbursed per rolling 12 months.          No PA is required, routing to review and advise that patient meets criteria. If so , please route to schedulers to schedule      Decision ID #: [id number]        Yasemin Dudley   [facility]

## 2025-01-24 NOTE — TELEPHONE ENCOUNTER
Spoke with Les and he reports that he had greater than 50% pain relief for at least 6 months and would like to repeat the RFA. Okay to schedule repeat RFA bilaterally at L4, L5 and sacral ala.    KERLINE RivasN, RN  Care Coordinator  [facility] [place]

## 2025-01-27 DIAGNOSIS — R79.89 ELEVATED SERUM CREATININE: Primary | ICD-10-CM

## 2025-01-28 NOTE — TELEPHONE ENCOUNTER
Screening Questions for RFA Procedure      Procedure ordered? repeat RFA bilaterally at L4, L5 and sacral ala. Pt is scheduled with Dr. Holley on 04/04 with Dr. Holley    What insurance are we billing for this procedure?  [facility]  IF SCHEDULING AT [facility] PLEASE SCHEDULE AT LEAST 7-10 BUSINESS DAYS OUT SO A PA CAN BE OBTAINED    Is patient scheduled at [facility]? NO   If YES, route every encounter to [facility] CARE NAVIGATION POOL [[phone]]  Has patient had this injection before? Yes: 04/2024  Any chance of pregnancy? Not Applicable   If YES, do NOT schedule and route to RN pool     Is  Needed?: No  Will patient have a ?  Yes   If pt is given sedation meds, no driving for 24 hours.  Is pt taking a cab or transportation service? NO      If so will need to be accompanied by an adult too (friend/family member) in order for IV sedation to be given.    Per [facility] Policy:  Outpatients are to have responsible adult or family member to accompany them at discharge and drive them home. A service providing medically trained drivers or attendants would be acceptable. Public transportation would not be acceptable unless the patient is accompanied by a responsible adult or family member.  Is patient taking any blood thinners (i.e. plavix, coumadin, jantoven, warfarin, heparin, pradaxa or dabigatran, etc)? No   If YES, do NOT schedule, and route to RN pool    Is patient taking any aspirin products (includes Excedrin and Fiorinal)? No   If yes route to RN pool/ Dr. Zhang's Team - Do not schedule     Is patient taking any GLP-1 Antagonist (hold needed for sedation patients only) No   (semaglutide (Ozempic, Wegovy), dulaglutide (Trulicity), exenatide ER (Bydureon), tirzepatide (Mounjaro), Liraglutide (Saxenda, Victoza), semaglutide (Rybelsus),Terzepatide (Zepbound)  If YES, okay to schedule AND route to RN nurse / Dr. Zhang's Team    Does the patient have a bleeding or clotting  disorder? No   If YES, it it OKAY to schedule AND route to RN pool  **For any patients with platelet count <100, must be forwarded to provider**    Is patient diabetic? No If YES, have them bring their glucometer.    Does patient have an active infection or treated for one within the past week? No   If YES, do NOT schedule and route to RN nurse pool     Is patient currently taking any antibiotics?  No  For patients on chronic, preventative, or prophylactic antibiotics, procedures may be scheduled.   For patients on antibiotics for active or recent infection:antibiotic course must have been completed for 4 days    Is patient currently taking any steroid medications? (i.e. Prednisone, Medrol)  No   For patients on steroid medications, course must have been completed for 4 days    Is patient actively being treated for cancer or immunocompromised, including the spleen having been removed? Yes - Immunocompromised due to RA  If YES, do NOT schedule and route to RN pool     Any history of complications with sedation medications?  NO   If YES, OK to schedule AND route to RN pool     Any history of sleep apnea?  NO   If YES, OK to schedule AND route to RN pool     Any cardiac history?  NO   If YES, OK to schedule AND route to RN pool     Do you have an implanted pacemaker, ICD (implanted cardiac device) or AICD (automatic implanted cardiac device)?  NO, but pt stated that he currently has a heart monitor glued on to the chest for 14 days - Please review  If YES, do NOT schedule AND route to RN pool (for all providers including Dr Avendaño)   Obtain name of device :     Obtain name of cardiologist:       Do you have an implanted stimulator?  NO  If YES, OK to schedule AND route to nursing.   Instruct patient to bring in the remote to the appointment and it will need to be turned off.        Does patient have an allergy to contrast dye, iodine or shellfish?  No   If YES, OK to schedule. Route to RN pool AND add  allergy information to appointment notes    Are you able to get on and off an exam table with minimal or no assistance? Yes   If NO, do NOT schedule and route to RN pool    Are you able to roll over and lay on your stomach with minimal or no assistance? Yes   If NO, do NOT schedule and route to RN pool    Reminders:  If you are started on any steroids or antibiotics between now and your appointment, you must contact us because it may affect our ability to perform your procedure.  Yes  Informed patient that s/he needs to fast for 6 hours before procedure?  YES  Informed patient that it is OK to take normal medications with sips of water, especially blood pressure medications, before the procedure and must hold blood thinners as instructed.  Yes  Informed patient to arrive 30 minutes before procedure time to have an IV inserted.  reviewed   Do NOT schedule at 0745, 0815 or 1245.  reviewed   All radiofrequency ablations are in a 60 minute time slot.If cervical RFA, please schedule each side separate. If okay to do bilateral cervical RFA, schedule for 90 minutes.  reviewed     Dr. Adorno Pt's - Imaging Orders Needed   Please send all injections to RN Pool Not Applicable  Red Flags? Not Applicable  Loreto Alcaraz      [facility]  Pain Management

## 2025-01-30 NOTE — TELEPHONE ENCOUNTER
MUKESH INITIATED    Medication: XELJANZ XR 11 MG PO TB24  Foundation Name: AP  Date submitted: 1/30/2025 10:12 AM    Received Income and submitted application        Prior Authorization Approval    Medication: XELJANZ XR 11 MG PO TB24  Authorization Effective Date: 1/1/2025  Authorization Expiration Date: 12/31/2025  Approved Dose/Quantity:   Reference #: Key: [id number]   Insurance Company: [employer] Part D - Phone [phone] Fax [phone]  Expected CoPay: $    CoPay Card Available:      Financial Assistance Needed:   Which Pharmacy is filling the prescription: [facility] - [place], [place] - [address]

## 2025-01-31 NOTE — TELEPHONE ENCOUNTER
MUKESH APPROVED    Medication: XELJANZ XR 11 MG PO TB24  Amount: $ 2,000  [facility] [place] Name: [facility] Effective Date: 1/31/2025  Foundation Expiration Date: 12/31/2025  Additional Information: need new Rx sent to [facility], email new start, convert from  PAP  Patient Notified: Yes ,via My Chart

## 2025-02-03 NOTE — PROGRESS NOTES
Medication Therapy Management (MTM) Encounter    ASSESSMENT:                            Medication Adherence/Access: No issues identified.    Ankylosing spondylitis/Rheumatoid arthritis: Stable    Vaccinations: Stable, up-to-date on all recommended vaccines    Chronic pain: Stable.    Constipation: May benefit from changing docusate to senna-docusate and titrating the dose up.  Stimulant laxative may be more effective for counteracting opioid induced constipation.    PLAN:                            1.  I would recommend trying Senna-Docusate 8.6-50 mg 1-2 tablets up to twice daily to help with constipation.  You could start with trying 1 tablet every day, then gradually increase the dose each week up to 2 tablets   - this could replace the docusate since it also contains docusate.    Follow-up: In 6 months with MTM pharmacist (around 8/4/25), sooner if needed    SUBJECTIVE/OBJECTIVE:                          Jamison Breen is a 59 year old male seen for an initial visit. He was referred to me from Dr. Novak.      Reason for visit: Patient recently enrolled in the [place] patient assistance program for Xeljanz, needs new prescription.    Allergies/ADRs: Reviewed in chart  Past Medical History: Reviewed in chart  Tobacco: He reports that he has never smoked. He has never been exposed to tobacco smoke. He quit smokeless tobacco use about 5 years ago.  His smokeless tobacco use included chew.  Alcohol: Not discussed    Medication Adherence/Access: he has tried using a pillbox but typically can't fit all of his meds in it, so doesn't always set them up in it.  Doesn't think he misses doses but sometimes forgets if he has already taken them    -Also follows with MTM pharmacist Caitlin Cohen    Ankylosing spondylitis/Rheumatoid arthritis  -Methotrexate 20 mg weekly  -Folic acid 3 mg daily  -Xeljanz XR 11 mg daily  Patient reports this regimen is working very well.  He rarely has flareups of his arthritis.  Does have  other    Last lab monitoring completed: creatinine was slightly elevated, otherwise methotrexate monitoring was within normal limits     Pre-Biologic Screening:   Hep B Core Antibody  Non-reactive (5/17/2023)    Hep B Surface Antigen Non-reactive (5/17/2023)    Hep C Antibody  Non-reactive (5/17/2023)    HIV Antigen Antibody Non-reactive (10/11/2021)    Quantiferon TB Gold Negative (5/17/2023)      Vaccinations:  Immunization History   Covid-19 vaccine ([id number] version)  Up-to-date   Influenza (annual) Up-to-date, avoid live FluMist   Pneumococcal  Pneumovax-23: 11/25/2022  Prevnar-20: 3/1/2024 Up-to-date   Tetanus/Tdap  Up-to-date   Shingrix Up-to-date   RSV (only for >= 60 years old)  Not eligible based on age   All patients on biologics should avoid live vaccines (varicella/VZV, intranasal influenza, MMR, or yellow fever vaccine (if traveling))       Chronic pain:  -Tizanidine 4 mg 3 times daily as needed  -Buprenorphine 8 mg sublingual tablet 3 times daily  -Oxycodone 5 mg every 6 hours as needed, may use this a few times per month  -Naloxone nasal spray as needed  Has pain all over his body.  He does feel the buprenorphine helps a lot. Going in for a nerve ablation which also helps too.  Reports constipation likely from his buprenorphine    Constipation:  -Docusate 100 mg twice daily  -Miralax 17 g daily  Has very hard and difficult bowel movements.  Reports he is often having significant straining. Drinks a lot of water and does not feel he could drink any more water than he currently is.  He is still having significant difficulty despite taking the docusate and MiraLAX    ----------------    I spent 40 minutes with this patient today. All changes were made via collaborative practice agreement with Juan Novak.     A summary of these recommendations was sent via [employer].    Selena Dodge, AdelaD  Medication Therapy Management Pharmacist  [facility]    Telemedicine Visit  Details  The patient's medications can be safely assessed via a telemedicine encounter.  Type of service:  Telephone visit  Originating Location (pt. Location): Home    Distant Location (provider location):  Off-site  Start Time: 12:30 PM  End Time: 1:10 PM     Medication Therapy Recommendations  Drug-induced constipation   1 Current Medication: docusate sodium (COLACE) 100 MG capsule   Current Medication Sig: Take 2 capsules (200 mg) by mouth 2 times daily   Rationale: More effective medication available - Ineffective medication - Effectiveness   Recommendation: Change Medication   Status: Accepted per CPA   Identified Date: 2/4/2025 Completed Date: 2/4/2025   Note: Changed to senna docusate

## 2025-02-04 ENCOUNTER — VIRTUAL VISIT (OUTPATIENT)
Dept: PHARMACY | Facility: CLINIC | Age: 60
End: 2025-02-04
Attending: INTERNAL MEDICINE
Payer: COMMERCIAL

## 2025-02-04 DIAGNOSIS — G89.4 CHRONIC PAIN SYNDROME: ICD-10-CM

## 2025-02-04 DIAGNOSIS — M45.8 ANKYLOSING SPONDYLITIS OF SACRAL REGION (H): ICD-10-CM

## 2025-02-04 DIAGNOSIS — K59.03 DRUG-INDUCED CONSTIPATION: ICD-10-CM

## 2025-02-04 DIAGNOSIS — M06.9 RHEUMATOID ARTHRITIS INVOLVING MULTIPLE SITES, UNSPECIFIED WHETHER RHEUMATOID FACTOR PRESENT (H): Primary | ICD-10-CM

## 2025-02-04 RX ORDER — TOFACITINIB 11 MG/1
11 TABLET, FILM COATED, EXTENDED RELEASE ORAL DAILY
Qty: 90 TABLET | Refills: 1 | Status: SHIPPED | OUTPATIENT
Start: 2025-02-04

## 2025-02-04 NOTE — PATIENT INSTRUCTIONS
"Recommendations from today's MTM visit:                                                      1.  I would recommend trying Senna-Docusate 8.6-50 mg 1-2 tablets up to twice daily to help with constipation.  You could start with trying 1 tablet every day, then gradually increase the dose each week up to 2 tablets   - this could replace the docusate since it also contains docusate.    Follow-up: In 6 months with MTM pharmacist (around 8/4/25), sooner if needed    It was great speaking with you today.  I value your experience and would be very thankful for your time in providing feedback in our clinic survey. In the next few days, you may receive an email or text message from [employer] with a link to a survey related to your  clinical pharmacist.\"     To schedule another MTM appointment, please call the clinic directly or you may call the MTM scheduling line at [phone].    My Clinical Pharmacist's contact information:                                                      Please feel free to contact me with any questions or concerns you have.      Selena Dodge, PharmD  Medication Therapy Management Pharmacist  [facility]     "

## 2025-02-09 ENCOUNTER — NURSE TRIAGE (OUTPATIENT)
Dept: FAMILY MEDICINE | Facility: CLINIC | Age: 60
End: 2025-02-09
Payer: COMMERCIAL

## 2025-02-10 ENCOUNTER — ANCILLARY PROCEDURE (OUTPATIENT)
Dept: CT IMAGING | Facility: CLINIC | Age: 60
End: 2025-02-10
Payer: COMMERCIAL

## 2025-02-10 DIAGNOSIS — R07.9 CHEST PAIN, UNSPECIFIED TYPE: ICD-10-CM

## 2025-02-10 PROCEDURE — 75571 CT HRT W/O DYE W/CA TEST: CPT | Mod: GA | Performed by: INTERNAL MEDICINE

## 2025-02-10 NOTE — TELEPHONE ENCOUNTER
S-(situation): Patient was called about his dizziness he is experiencing.      B-(background): Patient has never had this before. He notes he is being worked up for cardiac issues currently.      A-(assessment): Patient states the dizziness occurs when he lays down or changes positions.  He notes the room is spinning.  He denies any shortness of breath or chest pain.  He notes feeling lightheaded and like he is going to black out.      R-(recommendations): See in office today.  Patient declined to be seen.  Patient states he will wait until after his stress test on Wednesday to see if that shows anything.  Instructed patient to call back if any symptoms get worse, patient verbalized understanding.      Kristina Kjellberg, MSN, RN    Reason for Disposition   MODERATE dizziness (e.g., interferes with normal activities)  (Exception: Dizziness caused by heat exposure, sudden standing, or poor fluid intake.)    Additional Information   Negative: SEVERE difficulty breathing (e.g., struggling for each breath, speaks in single words)   Negative: Shock suspected (e.g., cold/pale/clammy skin, too weak to stand, low BP, rapid pulse)   Negative: Difficult to awaken or acting confused (e.g., disoriented, slurred speech)   Negative: Fainted, and still feels dizzy afterwards   Negative: Overdose (accidental or intentional) of medications   Negative: New neurologic deficit that is present now: * Weakness of the face, arm, or leg on one side of the body * Numbness of the face, arm, or leg on one side of the body * Loss of speech or garbled speech   Negative: Heart beating < 50 beats per minute OR > 140 beats per minute   Negative: Sounds like a life-threatening emergency to the triager   Negative: Chest pain   Negative: Rectal bleeding, bloody stool, or tarry-black stool   Negative: Vomiting is main symptom   Negative: Diarrhea is main symptom   Negative: Headache is main symptom   Negative: Heat exhaustion suspected (i.e.,  "dehydration from heat exposure)   Negative: Patient states that they are having an anxiety or panic attack   Negative: Dizziness from low blood sugar (i.e., < 60 mg/dl or 3.5 mmol/l)   Negative: SEVERE dizziness (e.g., unable to stand, requires support to walk, feels like passing out now)   Negative: SEVERE headache or neck pain   Negative: Spinning or tilting sensation (vertigo) present now and one or more stroke risk factors (i.e., hypertension, diabetes mellitus, prior stroke/TIA, heart attack, age over 60) (Exception: Prior physician evaluation for this AND no different/worse than usual.)   Negative: Neurologic deficit that was brief (now gone), ANY of the following:* Weakness of the face, arm, or leg on one side of the body* Numbness of the face, arm, or leg on one side of the body* Loss of speech or garbled speech   Negative: Loss of vision or double vision  (Exception: Similar to previous migraines.)   Negative: Extra heartbeats, irregular heart beating, or heart is beating very fast (i.e., 'palpitations')   Negative: Difficulty breathing   Negative: Drinking very little and dehydration suspected (e.g., no urine > 12 hours, very dry mouth, very lightheaded)   Negative: Follows bleeding (e.g., stomach, rectum, vagina)  (Exception: Became dizzy from sight of small amount blood.)   Negative: Patient sounds very sick or weak to the triager   Negative: Lightheadedness (dizziness) present now, after 2 hours of rest and fluids   Negative: Spinning or tilting sensation (vertigo) present now   Negative: Fever > 103 F (39.4 C)   Negative: Fever > 100.0 F (37.8 C) and has diabetes mellitus or a weak immune system (e.g., HIV positive, cancer chemotherapy, organ transplant, splenectomy, chronic steroids)    Answer Assessment - Initial Assessment Questions  1. DESCRIPTION: \"Describe your dizziness.\"      Feeling dizzy with any position besides standing   2. LIGHTHEADED: \"Do you feel lightheaded?\" (e.g., somewhat faint, " "woozy, weak upon standing)      Feels like going to black out.    3. VERTIGO: \"Do you feel like either you or the room is spinning or tilting?\" (i.e. vertigo)      Room is spinning   4. SEVERITY: \"How bad is it?\"  \"Do you feel like you are going to faint?\" \"Can you stand and walk?\"    - MILD: Feels slightly dizzy, but walking normally.    - MODERATE: Feels unsteady when walking, but not falling; interferes with normal activities (e.g., school, work).    - SEVERE: Unable to walk without falling, or requires assistance to walk without falling; feels like passing out now.       Moderate but then gets better in a couple of minutes  5. ONSET:  \"When did the dizziness begin?\"      1 week ago  6. AGGRAVATING FACTORS: \"Does anything make it worse?\" (e.g., standing, change in head position)      Lying down, changing head position can also cause it.    7. HEART RATE: \"Can you tell me your heart rate?\" \"How many beats in 15 seconds?\"  (Note: not all patients can do this)        Unsure, walking right now,   8. CAUSE: \"What do you think is causing the dizziness?\"      Not sure, doesn't feel like ears hurt  9. RECURRENT SYMPTOM: \"Have you had dizziness before?\" If Yes, ask: \"When was the last time?\" \"What happened that time?\"      Probably in the past, but isn't sure.    10. OTHER SYMPTOMS: \"Do you have any other symptoms?\" (e.g., fever, chest pain, vomiting, diarrhea, bleeding)        Slight runny nose,   11. PREGNANCY: \"Is there any chance you are pregnant?\" \"When was your last menstrual period?\"        NA    Protocols used: Dizziness-A-OH    "

## 2025-02-14 ENCOUNTER — PHARMACY VISIT (OUTPATIENT)
Dept: ADMINISTRATIVE | Facility: CLINIC | Age: 60
End: 2025-02-14
Payer: COMMERCIAL

## 2025-02-14 NOTE — PROGRESS NOTES
" Activity Date 2025/02/14     Activity Medications    XELJANZ XR      Summary Notes   Spoke with patient for Therapy Management Assessment.     Current Regimen: Xeljanz XR 11mg daily     Adherence: Started in 8/2023. Patient has used us before, so he understands the refill process and texting.   Tolerability: Denies SE   Med/Allergy Changes: None. Also takes MTX and oxycodone     *RA / AS*: RAPID3 = 12.66 (moderate to high disease activity). Brief assessment, as he has been on this for a year and a half already. Patient did not isolate any specific areas of symptoms, he just states that \"it is all over\". He says that he also has other aches and pains going on that contribute to the high RAPID score, and that he feels like he is in a small flare-up right now. He rated his current pain score at a 6 out of 10, but he says he is content with the way this medication is working.     Follow-Up: Passing to [facility]    Beau Ballesteros, PharmD  Therapy Management Pharmacist  [facility]          Care Details    Please select a health assessment questionnaire to conduct with patient: Select RAPID-3   ? RAPID-3    Dress yourself, including tying shoelaces and doing buttons?   ? With some difficulty         Get in and out of bed?   ? With some difficulty         Lift a full cup or glass to your mouth?   ? Without any difficulty         Walk outdoors on flat ground?   ? With some difficulty         Wash and dry your entire body?   ? With some difficulty         Bend down to  clothing from the floor?   ? With some difficulty         Turn regular faucets on and off?   ? With some difficulty         Get in and out of a car, bus, train or airplane?   ? With some difficulty         Walk two miles or three kilometers, if you wish?   ? With much difficulty         Participate in recreational activities and sports as you would like, if you wish?   ? With much difficulty         How much pain have you had because of your " condition over the past week? Please indicate how severe your pain has been, on the scale from 0-10 scale (with 0 being no pain and 10 being pain as bad as it could be)   ? 6.0          Considering all the ways in which illness and health conditions may affect you at this time, please indicate how you are doing on the scale of 0-10 (with 0 being very well and 10 being very poorly)   ? 3.0          Please enter the RAPID-3 score. [QA Metric]   ? 12.66    Has the patient been on current medication for more than 6 months?   ? Yes         What are the patient's current rheumatoid arthritis symptoms?   ? Morning stiffness   ? Joint tenderness   ? Joint pain      What are the patient's goals for this therapy?   ? Reduce pain, stiffness, inflammation      Is patient meeting treatment goals?   ? Progressing toward goal      Based on the patient's report or refill record over the last 6-12 months, does the patient appear to be taking less than 80% of their medication? [QA Metric]   ? No

## 2025-02-20 LAB — CV ZIO PRELIM RESULTS: NORMAL

## 2025-02-25 DIAGNOSIS — F32.A DEPRESSION, UNSPECIFIED DEPRESSION TYPE: ICD-10-CM

## 2025-03-04 ENCOUNTER — MYC MEDICAL ADVICE (OUTPATIENT)
Dept: RHEUMATOLOGY | Facility: CLINIC | Age: 60
End: 2025-03-04

## 2025-03-04 ENCOUNTER — VIRTUAL VISIT (OUTPATIENT)
Dept: RHEUMATOLOGY | Facility: CLINIC | Age: 60
End: 2025-03-04
Payer: COMMERCIAL

## 2025-03-04 DIAGNOSIS — M45.8 ANKYLOSING SPONDYLITIS OF SACRAL REGION (H): ICD-10-CM

## 2025-03-04 DIAGNOSIS — F32.A DEPRESSION, UNSPECIFIED DEPRESSION TYPE: ICD-10-CM

## 2025-03-04 DIAGNOSIS — M06.9 RHEUMATOID ARTHRITIS INVOLVING MULTIPLE SITES, UNSPECIFIED WHETHER RHEUMATOID FACTOR PRESENT (H): ICD-10-CM

## 2025-03-04 PROCEDURE — 98006 SYNCH AUDIO-VIDEO EST MOD 30: CPT | Performed by: INTERNAL MEDICINE

## 2025-03-04 PROCEDURE — G2211 COMPLEX E/M VISIT ADD ON: HCPCS | Mod: 95 | Performed by: INTERNAL MEDICINE

## 2025-03-04 RX ORDER — FOLIC ACID 1 MG/1
3000 TABLET ORAL DAILY
Qty: 270 TABLET | Refills: 2 | Status: SHIPPED | OUTPATIENT
Start: 2025-03-04

## 2025-03-04 RX ORDER — METHOTREXATE 2.5 MG/1
20 TABLET ORAL
Qty: 96 TABLET | Refills: 3 | Status: SHIPPED | OUTPATIENT
Start: 2025-03-04

## 2025-03-04 RX ORDER — TOFACITINIB 11 MG/1
11 TABLET, FILM COATED, EXTENDED RELEASE ORAL DAILY
Qty: 90 TABLET | Refills: 2 | Status: SHIPPED | OUTPATIENT
Start: 2025-03-04

## 2025-03-04 RX ORDER — PREDNISONE 5 MG/1
TABLET ORAL
Qty: 35 TABLET | Refills: 1 | Status: SHIPPED | OUTPATIENT
Start: 2025-03-04

## 2025-03-04 RX ORDER — FOLIC ACID 1 MG/1
3000 TABLET ORAL DAILY
Qty: 300 TABLET | Refills: 2 | OUTPATIENT
Start: 2025-03-04

## 2025-03-04 NOTE — PATIENT INSTRUCTIONS
Diagnosis:  1.  Seropositive rheumatoid arthritis: Joint pain is improved after restart of tofacitinib (Xeljanz). I recommend continued combination Xeljanz and methotrexate.  2. Osteoarthritis: hands and knees:  3. Depression    Plan:  1.  Continue tofacitinib 11 mg long-acting, once daily  2.  Continue methotrexate 8 tablets (20 mg) once weekly.While on methotrexate:   -- Check blood tests every 3 months (AST/ALT, Albumin, CBC with platelets)   -- Limit alcohol intake to 2 drinks weekly; use folate 3 mg daily.  --Tylenol 500-1000 mg can be used as needed up to three times daily for nausea/headache associated with dosing.    3.  For residual pain, use ibuprofen 600 mg every 8 hours with food for 7 to 10 days, then resume as needed use, interspersed with acetaminophen 1000 mg 3 times daily for joint pain. Use 1% voltaren gel.  4.  Hold prednisone except for 1 or 2 tabs once monthly, due to increased anxiety/panic symptoms.  5.  Continue paraffin wax bath in order to provide comforting and pain reducing heat to the joints of the fingers and knuckles.  May use paraffin bath as many times as is needed, daily.  6. Weight management plan with primary care.

## 2025-03-04 NOTE — TELEPHONE ENCOUNTER
Patient was seen today and forgot to ask if he should have Hep A and Hep B vaccines?      Had hep B core antibody 5/17/23 result = non reactive  Hep B surface antigen 5/`7/23 result =  non reactive     Cristiana Brown RN

## 2025-03-04 NOTE — PROGRESS NOTES
During this virtual visit the patient is located in [place], patient verifies this as the location during the entirety of this visit.     Video-Visit Details  Video Start Time: 7:44 AM    Type of service:  Video Visit    Video End Time:8:02 AM    Originating Location (pt. Location): Home  Distant Location (provider location):  [facility]  Platform used for Video Visit: [facility]    Rheumatology Clinic Visit  [facility]  Juan Novak M.D.     Jailene Breen MRN# [id number]   YOB: 1965 Age: 59 year old   Date of Visit: 03/04/2025  Primary care provider: Minda Monzon          Assessment and Plan:     # Seropositive rheumatoid arthritis (RF+, CCP+)  # Osteoarthritis, hands  # Chronic low back pain, opiate-Rxd    Patient relates stable symmetric polyarthralgia and stiffness.  There is morning accentuation of hand and foot pain; however repetitive use associated pain in the hands including the DIPs is also reported.  Video exam shows no gross swelling or asymmetry in MCPs, PIPs, wrists.  Range of motion is wrists, elbows, shoulders is preserved.    Data: On January 22, 2025, creatinine was 1.20, up from 1.03.  Transaminases, albumin, and CRP all normal; CBC was normal.  Sed rate normal at 14.    In 2016, rheumatoid factor elevated at 78 international units; cyclic citrullinated peptide antibodies greater than 340.    Imaging: MRI of the brain on March 22, 2024 showed scattered white matter T2 hyperintensities.    Discussion: Seropositive rheumatoid arthritis is well controlled.  Noninflammatory, mechanical symptoms are still present, largely in the PIPs and DIPs of the hands and in the knees.  I think that high risk inflammatory arthritis is responding to immunomodulatory therapy.  For inflammatory arthritis, I recommend continuing combination Xeljanz and methotrexate, and monitoring appropriately for renal function, liver function, blood counts, and  intermittent testing for lipids.  2-week courses of tapering low-dose prednisone may be used for intermittent flares, no more than once every quarter.  Osteoarthritis, continue symptomatic treatment.  I explained to the patient that an in person examination of the joints will be important for guiding future therapy, and deciding whether alternative treatment for RA would be appropriate.    Diagnosis:  1.  Seropositive rheumatoid arthritis: Joint pain is stable with use of methotrexate and Xeljanz.  I recommend continued combination Xeljanz and methotrexate.  2. Osteoarthritis: hands and knees; much pain that occurs with frequent repetitive use of the hands is likely due to osteoarthritis.    Plan:  1.  Continue tofacitinib 11 mg long-acting, once daily  2.  Continue methotrexate 8 tablets (20 mg) once weekly.While on methotrexate:   -- Check blood tests every 3 months (AST/ALT, Albumin, CBC with platelets)   -- Limit alcohol intake to 2 drinks weekly; use folate 3 mg daily.  --Tylenol 500-1000 mg can be used as needed up to three times daily for nausea/headache associated with dosing.    3.  For residual pain, use ibuprofen 600 mg every 8 hours with food for 7 to 10 days, then resume as needed use, interspersed with acetaminophen 1000 mg 3 times daily for joint pain. Use 1% voltaren gel.  4.  Hold prednisone except for 1 or 2 tabs once monthly, due to increased anxiety/panic symptoms.  5.  Continue paraffin wax bath in order to provide comforting and pain reducing heat to the joints of the fingers and knuckles.  May use paraffin bath as many times as is needed, daily.  6. Weight management plan with primary care.    RTC 6 months    Juan Novak MD  Staff Rheumatologist [facility]    On the day of the encounter, a total of 32 minutes was spent in chart review, and in counseling and coordination of care, regarding the patient's complex medical problem of seropositive rheumatoid arthritis, high risk  "medication.    The longitudinal plan of care for the diagnosis(es)/condition(s) as documented were addressed during this visit. Due to the added complexity in care, I will continue to support Les in the subsequent management and with ongoing continuity of care.    No orders of the defined types were placed in this encounter.           History of Present Illness:   Jailene Breen presents for follow-up of rheumatoid arthritis.  HE Has past medical history of depression, anxiety, obesity, seropositive rheumatoid arthritis.  He was last seen by video on .  At that visit, rheumatoid arthritis was under improved control. Recommendation was to continue tofacitinib 11 mg daily, and methotrexate 20 mg  weekly.    RA background: He was diagnosed with seropositive rheumatoid arthritis in 2016, established care with Dr. Raygoza in 5/2016 and was started on methotrexate.  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 He was started on Humira in 12/2016 due to AS.  Discontinued methotrexate in 2/2017 due to lack of improvement. Humira was changed to Enbrel in 4/2019 due to lack of improvement.  Methotrexate was restarted in July 2020.  Etanercept added by Dr. Parada in 2021; stopped due to lack of efficacy in December 2023.  Tofacitinib started in early 2024.  Tofacitinib and methotrexate combination continued October 2024.      Interval history March 3, 2025    His joints are better than before he restarted methotrexate in early 2024.  No regular swelling redness warmth; he has used 2-week courses of prednisone several times in the past 6 months for \"puffiness\" associated with pain in his fingers and knuckles.  Early morning stiffness is 30 minutes. ADLs are occasionally affected. Still has ADL limitation, unable to to up/down stairs due to knees and ankles. Prolonged use of " "knives and forks. 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 Helped by hot shower, moist heat  Constipation active, helped by docusate.    Taking xeljanz 1 tab 11 mg daily. No AE  Taking methotrexate 8 tabs weekly. No clear AE, but he has issues with constipation. Using docusate.  If joint pain is bad, he takes advil, every couple months.  Taking 4-6 tylenol daily, still on buprenorphine, also advil    He formerly ran marathons (last 2018). He recalls that joint pain during run actually improved the farther he went at that time.  Now due to weight and increased age, he can no longer run long distances.    Interval history October 7, 2024    Joints still hurt/are sore. Fingers and toes predominant.    Seems like \"tofa does not work as well as the injectables\". Pain is most noticeable in the PIPs and DIPs. He recalls more ups and downs with pain when he was taking enbrel.Fingers look \"plump\", but there is no warmth or redness.  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  He does note \"flareups\" that he attributes to RA, once a month with red/puffy fingers.    Hand joint pain is worse in the morning but it is more noticeable after a day's activities.  Early morning stiffness is about 1 hour.    Taking xeljanz 1 tab daily.  Taking methotrexate 8 tabs weekly. No clear AE, but he has issues with constipation. Using docusate.  If joint pain is bad, he takes advil, every couple months.  Taking 4-6 tylenol   Also takes advil 2-6 tabs per day.     He takes prednisone \"small amounts\" once monthly.      Initial history August 5, 2024:    He was doing \"ok\" until he ran out of tofacitinib. He went without medication for a week in late July.  He rapidly developed worsening pain in shoulders, elbows, fingers, and ankles with increased stiffness lasting for more than 1 hour in the mornings.      He has noted \"flares\" of joint pain in recent months with days of stiffness, inability to move " "it freely, and throbbing pain in hands and feet.  Joint pain, especially shoulders, elbows, fingers, ankles have shooting characteristics.  No visible swelling. Pain is bilateral, symmetrical, + morning accentuation of joint pain, but he has trouble falling asleep as well. He restarted tofacitinib 2 days ago. Pain is improving since restart of xeljanz, and since he increased prednisone slightly to 10 mg daily.    He definitely thinks that xeljanz and methotrexate are beneficial. No AE  Takes methotrexate 8 tabs once a week.  Taking prednisone 5 mg daily; he finds that higher doses increases anxiety.   Takes ibu and acetaminophen, alternating, in the mornings, and at night.    He has noted some constipation recently; \"stools are hard\". Primary provider recently increased docusate.  He is active, walks the dog daily. He does note low exercise tolerance with excess sweating.    Interim History, Dr. Parada December 1, 2023: In the last couple months he had flare up, it starts in his hands, ankles and then in his shoulders. Can not lift his arm up. Both the shoulders were hurting bad. He is having diarrhea for last couple months. He has started a new antidepressant. He is on Enbrel but has not taken Enbrel for a month due to diarrhea issues.  He thinks that Enbrel helps with his joint pains.  He is on methotrexate 4 tablets once a week but cannot tell if it helps him or not.  He has prednisone tablets and take 20 mg once a month when his symptoms worsens.  He does not like to take prednisone since his anxiety symptoms get worse.     He has lumbar degenerative disc disease and gets MAKAYLA.  He has noticed urinary retention issues sporadically.           Review of Systems:     Constitutional: negative  Skin: negative  Eyes: negative  Ears/Nose/Throat: negative  Respiratory: No shortness of breath, dyspnea on exertion, cough, or hemoptysis  Cardiovascular: negative  Gastrointestinal: negative  Genitourinary: " negative  Musculoskeletal: negative  Neurologic: negative  Psychiatric: negative  Hematologic/Lymphatic/Immunologic: negative  Endocrine: negative         Active Problem List:     Patient Active Problem List    Diagnosis Date Noted    Chronic, continuous use of opioids 03/01/2024     Priority: Medium    Morbid obesity (H) 12/08/2022     Priority: Medium    Encounter for screening colonoscopy 03/25/2022     Priority: Medium    Major depressive disorder, recurrent episode, severe with mixed features (H) 04/01/2021     Priority: Medium    Suicidal ideation 03/16/2021     Priority: Medium    Immunocompromised 01/13/2021     Priority: Medium    Moderate major depression (H) 10/06/2020     Priority: Medium    Ankylosing spondylitis (H) 03/01/2017     Priority: Medium    Narcotic abuse, continuous (H) 02/20/2017     Priority: Medium     Formatting of this note might be different from the original.   was checked 2/20/17. 6 different controlled substances filled in February 2017 by 6 different providers plus fills in numerous cities over the last year but many providers. Should not be given any narcotics or benzodiazepines.      Generalized anxiety disorder 10/31/2016     Priority: Medium    Panic attack 10/31/2016     Priority: Medium    Right-sided thoracic back pain, unspecified chronicity 10/10/2016     Priority: Medium    Tear meniscus knee, right, initial encounter 09/15/2016     Priority: Medium    Rheumatoid arthritis involving multiple sites, unspecified rheumatoid factor presence 09/15/2016     Priority: Medium     IMO Regulatory Load OCT 2020      Nonintractable migraine 01/25/2016     Priority: Medium    Essential hypertension with goal blood pressure less than 140/90 11/21/2013     Priority: Medium    Lumbar radiculopathy 11/21/2013     Priority: Medium    Chronic back pain greater than 3 months duration 11/11/2013     Priority: Medium    CARDIOVASCULAR SCREENING; LDL GOAL LESS THAN 160 03/06/2012      Priority: Medium    Obesity, Class I, BMI 30-34.9 03/06/2012     Priority: Medium    Overweight 03/06/2012     Priority: Medium    Drug dependence (H) 02/13/2012     Priority: Medium    Chronic pain 09/04/2011     Priority: Medium    Plantar fascial fibromatosis 04/23/2007     Priority: Medium     Formatting of this note might be different from the original.  Overview:   left  Formatting of this note might be different from the original.  left      Sprain of sacroiliac region 12/14/2006     Priority: Medium    Hyperlipidemia 09/01/2006     Priority: Medium    Contact dermatitis and eczema 11/24/2004     Priority: Medium    Dermatitis 11/24/2004     Priority: Medium    Esophageal reflux 11/24/2004     Priority: Medium    Hematuria 11/24/2004     Priority: Medium            Past Medical History:     Past Medical History:   Diagnosis Date    Ankylosing spondylitis (H) 03/01/2017    Anxiety     Arthritis     back, knees    Back pain 11/17/2013    possible drug seeking behavior in the past.    Gastroesophageal reflux disease     Hypertension     Overweight (BMI 25.0-29.9) 03/06/2012    Panic attacks     Syncope, unspecified syncope type 02/27/2017    Tobacco use disorder 6/19/2017     Past Surgical History:   Procedure Laterality Date    ARTHROSCOPY KNEE Right 09/22/2016    Procedure: ARTHROSCOPY KNEE;  Surgeon: Fredo Hughes MD;  Location:  OR    COMBINED ESOPHAGOSCOPY, GASTROSCOPY, DUODENOSCOPY (EGD) WITH CO2 INSUFFLATION N/A 01/19/2021    Procedure: ESOPHAGOGASTRODUODENOSCOPY, WITH CO2 INSUFFLATION;  Surgeon: Brandon Mack MD;  Location:  OR    ESOPHAGOSCOPY, GASTROSCOPY, DUODENOSCOPY (EGD), COMBINED N/A 01/19/2021    Procedure: Esophagogastroduodenoscopy, With Biopsy;  Surgeon: Brandon Mack MD;  Location: [facility] OR    ESOPHAGOSCOPY, GASTROSCOPY, DUODENOSCOPY (EGD), COMBINED N/A 05/27/2021    Procedure: ESOPHAGOGASTRODUODENOSCOPY (EGD);  Surgeon: Chester Lynn MD;  Location:   GI    INJECT PARAVERTEBRAL FACET JOINT LUMBAR / SACRAL FIRST Right 11/25/2016    Procedure: INJECT PARAVERTEBRAL FACET JOINT LUMBAR / SACRAL FIRST;  Surgeon: Doretha Magallanes MD;  Location: [facility] OR    ORTHOPEDIC SURGERY Right     knee    PLANTAR FASCIA RELEASE Left             Social History:     Social History     Socioeconomic History    Marital status:      Spouse name: Not on file    Number of children: 2    Years of education: Not on file    Highest education level: Not on file   Occupational History    Not on file   Tobacco Use    Smoking status: Never     Passive exposure: Never    Smokeless tobacco: Former     Types: Chew     Quit date: 12/12/2019    Tobacco comments:     still chews nicotine gum   Vaping Use    Vaping status: Never Used   Substance and Sexual Activity    Alcohol use: No     Comment: none    Drug use: Yes     Frequency: 7.0 times per week     Types: Marijuana     Comment: medicinal (oral)    Sexual activity: Yes     Partners: Female     Comment: decreased with Prozac   Other Topics Concern    Parent/sibling w/ CABG, MI or angioplasty before 65F 55M? Not Asked   Social History Narrative    Not on file     Social Drivers of Health     Financial Resource Strain: Low Risk  (1/22/2025)    Financial Resource Strain     Within the past 12 months, have you or your family members you live with been unable to get utilities (heat, electricity) when it was really needed?: No   Food Insecurity: Low Risk  (1/22/2025)    Food Insecurity     Within the past 12 months, did you worry that your food would run out before you got money to buy more?: No     Within the past 12 months, did the food you bought just not last and you didn t have money to get more?: No   Transportation Needs: Low Risk  (1/22/2025)    Transportation Needs     Within the past 12 months, has lack of transportation kept you from medical appointments, getting your medicines, non-medical meetings or appointments, work, or from getting  "things that you need?: No   Physical Activity: Insufficiently Active (1/22/2025)    Exercise Vital Sign     Days of Exercise per Week: 3 days     Minutes of Exercise per Session: 20 min   Stress: Stress Concern Present (1/22/2025)    [employer] [place] [employer] - Occupational Stress Questionnaire     Feeling of Stress : Rather much   Social Connections: Unknown (1/22/2025)    Social Connection and Isolation Panel [NHANES]     Frequency of Communication with Friends and Family: Not on file     Frequency of Social Gatherings with Friends and Family: Twice a week     Attends Caodaism Services: Not on file     Active Member of Clubs or Organizations: Not on file     Attends Club or Organization Meetings: Not on file     Marital Status: Not on file   Interpersonal Safety: Low Risk  (3/1/2024)    Interpersonal Safety     Do you feel physically and emotionally safe where you currently live?: Yes     Within the past 12 months, have you been hit, slapped, kicked or otherwise physically hurt by someone?: No     Within the past 12 months, have you been humiliated or emotionally abused in other ways by your partner or ex-partner?: No   Housing Stability: Low Risk  (1/22/2025)    Housing Stability     Do you have housing? : Yes     Are you worried about losing your housing?: No          Family History:     Family History   Problem Relation Age of Onset    Hypertension Mother     Diabetes Mother     Depression Mother     Mental Illness Mother     Coronary Stenting Father 62        possible MI    Lung Cancer Paternal Uncle     Substance Abuse Brother     Substance Abuse Brother     Autoimmune Disease No family hx of     Anesthesia Reaction No family hx of     Thrombophilia No family hx of     Bleeding Disorder No family hx of             Allergies:     Allergies   Allergen Reactions    Mirtazapine      Other reaction(s): Coma    Hydrocodone Itching    Mirtazapine Other (See Comments)     \"Blacked out\" for one week    " Ms Contin [Morphine] Itching            Medications:     Current Outpatient Medications   Medication Sig Dispense Refill    atorvastatin (LIPITOR) 40 MG tablet Take 1 tablet (40 mg) by mouth daily. 90 tablet 3    buprenorphine (SUBUTEX) 8 MG SUBL sublingual tablet Place 1 tablet (8 mg) under the tongue 3 times daily. Fill 02/12/2025 and start 02/15/25.  30 day script for chronic pain 90 tablet 0    docusate sodium (COLACE) 100 MG capsule Take 2 capsules (200 mg) by mouth 2 times daily 60 capsule 10    FLUoxetine (PROZAC) 40 MG capsule Take 2 capsules (80 mg) by mouth daily. 180 capsule 0    folic acid (FOLVITE) 1 MG tablet Take 3 tablets (3,000 mcg) by mouth daily. 270 tablet 2    losartan (COZAAR) 25 MG tablet Take 1 tablet (25 mg) by mouth daily 90 tablet 3    medical cannabis (Patient's own supply) See Admin Instructions (The purpose of this order is to document that the patient reports taking medical cannabis.  This is not a prescription, and is not used to certify that the patient has a qualifying medical condition.)     Pills PRN   Lozenges PRN      methotrexate 2.5 MG tablet Take 8 tablets (20 mg) by mouth every 7 days. 96 tablet 3    naloxone (NARCAN) 4 MG/0.1ML nasal spray Spray 1 spray (4 mg) into one nostril alternating nostrils as needed for opioid reversal. every 2-3 minutes until assistance arrives 0.2 mL 1    omeprazole (PRILOSEC) 20 MG DR capsule Take 1 capsule (20 mg) by mouth daily. 90 capsule 1    ondansetron (ZOFRAN ODT) 4 MG ODT tab Take 1 tablet (4 mg) by mouth every 8 hours as needed for nausea 30 tablet 3    oxyCODONE (ROXICODONE) 5 MG tablet Take 1 tablet (5 mg) by mouth every 6 hours as needed for severe pain. Max of 3/day. Fill/begin 01/21/2025 Short term script for acute pain flare. 42 tablet 0    polyethylene glycol (MIRALAX) 17 GM/Dose powder Take 17 g (1 Capful) by mouth daily. 510 g 3    prazosin (MINIPRESS) 1 MG capsule Take 1 capsule (1 mg) by mouth at bedtime. 30 capsule 1     predniSONE (DELTASONE) 5 MG tablet Take 3 tabs daily for 1 week, then take 2 tabs daily for 1 week 35 tablet 1    QUEtiapine (SEROQUEL) 25 MG tablet Take 1 tablet (25 mg) by mouth at bedtime. May also take 1 tablet (25 mg) daily as needed (Anxiety). 45 tablet 0    tiZANidine (ZANAFLEX) 4 MG tablet Take 1 tablet (4 mg) by mouth 3 times daily as needed for muscle spasms. 3 month supply 225 tablet 1    tofacitinib (XELJANZ XR) 11 MG 24 hr tablet Take 1 tablet (11 mg) by mouth daily. Hold for signs of infection, then seek medical attention. 90 tablet 2    vitamin D2 (ERGOCALCIFEROL) 68875 units (1250 mcg) capsule Take 1 capsule (50,000 Units) by mouth once a week. 8 capsule 0            Physical Exam:   There were no vitals taken for this visit.  Wt Readings from Last 6 Encounters:   01/22/25 133.8 kg (295 lb)   08/02/24 132 kg (291 lb)   06/10/24 128.5 kg (283 lb 3.2 oz)   03/01/24 128.8 kg (284 lb)   12/08/22 131.5 kg (290 lb)   05/02/22 122.7 kg (270 lb 8 oz)     There is no height or weight on file to calculate BMI.  Constitutional: well-developed, appearing stated age; cooperative  Eyes: nl EOM, PERRLA, vision, conjunctiva, sclera  ENT: nl external ears, nose, hearing, lips, teeth, gums  Neck: no mass or thyroid enlargement  Resp: Breathing is unlabored  MS: PIPs, MCPs, wrists, elbows, and shoulders show full unrestricted range of motion.  Patient is able to tuck fingertips deeply into the palm on both sides.  No visible MCP or PIP swelling today.  Neuro: nl cranial nerves, strength, sensation, DTRs.   Psych: nl judgement, orientation, memory, affect.         Data:     @      Latest Ref Rng & Units 6/10/2024     1:02 PM 8/2/2024    11:25 AM 1/22/2025     9:43 AM   RHEUM RESULTS   Albumin 3.5 - 5.2 g/dL  4.2  4.3    ALT 0 - 70 U/L  31  36    AST 0 - 45 U/L  32  33    Creatinine 0.67 - 1.17 mg/dL  1.03  1.20    CRP Inflammation <5.00 mg/L  <3.00  <3.00    GFR Estimate >60 mL/min/1.73m2  85  71    Hematocrit 40.0 -  53.0 % 43.3  44.1  42.3    Hemoglobin 13.3 - 17.7 g/dL 14.3  14.7  14.3    WBC 4.0 - 11.0 10e3/uL 8.0  8.6  8.1    RBC Count 4.40 - 5.90 10e6/uL 4.77  4.91  4.79    RDW 10.0 - 15.0 % 13.4  13.3  13.4    MCHC 31.5 - 36.5 g/dL 33.0  33.3  33.8    MCV 78 - 100 fL 91  90  88    Platelet Count 150 - 450 10e3/uL 255  256  238    Sed Rate 0 - 20 mm/hr  14  14        Rheumatoid Factor   Date Value Ref Range Status   04/21/2016 78 (H) <20 IU/mL Final   ,   Cyclic Citrullinated Peptide Antibody, IgG   Date Value Ref Range Status   04/21/2016 >340 (H) <7 U/mL Final   ,  ,  ,  ,  ,  ,   LEOBARDO Screen by EIA   Date Value Ref Range Status   04/21/2016 <1.0  Interpretation:  Negative   <1.0 Final   ,  ,  ,  ,  ,  ,  ,   Hepatitis B Core Skye   Date Value Ref Range Status   07/13/2020 Nonreactive NR^Nonreactive Final     Hepatitis B Core Antibody Total   Date Value Ref Range Status   05/17/2023 Nonreactive Nonreactive Final   ,   Hep B Surface Agn   Date Value Ref Range Status   07/13/2020 Nonreactive NR^Nonreactive Final     Hepatitis B Surface Antigen   Date Value Ref Range Status   05/17/2023 Nonreactive Nonreactive Final   ,  ,  ,  ,   Quantiferon-TB Gold Plus   Date Value Ref Range Status   05/17/2023 Negative Negative Final     Comment:     No interferon gamma response to M.tuberculosis antigens was detected. Infection with M.tuberculosis is unlikely, however a single negative result does not exclude infection. In patients at high risk for infection, a second test should be considered in accordance with the 2017 ATS/IDSA/CDC Clinical Pract  ice Guidelines for Diagnosis of Tuberculosis in Adults and Children      TB1 Ag minus Nil Value   Date Value Ref Range Status   05/17/2023 0.00 IU/mL Final     TB2 Ag minus Nil Value   Date Value Ref Range Status   05/17/2023 0.00 IU/mL Final     Mitogen minus Nil Result   Date Value Ref Range Status   05/17/2023 9.95 IU/mL Final     Nil Result   Date Value Ref Range Status   05/17/2023 0.05  IU/mL Final   ,  ,  ,  ,  ,  ,  ,  ,  ,  ,  ,  ,  ,  ,  ,  ,  ,  ,  ,  ,  ,  ,  ,  ,  ,

## 2025-03-10 ENCOUNTER — OFFICE VISIT (OUTPATIENT)
Dept: FAMILY MEDICINE | Facility: CLINIC | Age: 60
End: 2025-03-10
Payer: COMMERCIAL

## 2025-03-10 VITALS
DIASTOLIC BLOOD PRESSURE: 80 MMHG | RESPIRATION RATE: 11 BRPM | HEIGHT: 75 IN | OXYGEN SATURATION: 96 % | TEMPERATURE: 98 F | WEIGHT: 297.4 LBS | BODY MASS INDEX: 36.98 KG/M2 | HEART RATE: 55 BPM | SYSTOLIC BLOOD PRESSURE: 143 MMHG

## 2025-03-10 DIAGNOSIS — R79.89 ELEVATED SERUM CREATININE: ICD-10-CM

## 2025-03-10 DIAGNOSIS — E78.2 MIXED HYPERLIPIDEMIA: ICD-10-CM

## 2025-03-10 DIAGNOSIS — R68.89 EXERCISE INTOLERANCE: Primary | ICD-10-CM

## 2025-03-10 DIAGNOSIS — R68.82 DECREASED LIBIDO: ICD-10-CM

## 2025-03-10 DIAGNOSIS — I10 ESSENTIAL HYPERTENSION WITH GOAL BLOOD PRESSURE LESS THAN 140/90: ICD-10-CM

## 2025-03-10 LAB
ALBUMIN SERPL BCG-MCNC: 4.3 G/DL (ref 3.5–5.2)
ALP SERPL-CCNC: 120 U/L (ref 40–150)
ALT SERPL W P-5'-P-CCNC: 25 U/L (ref 0–70)
ANION GAP SERPL CALCULATED.3IONS-SCNC: 12 MMOL/L (ref 7–15)
AST SERPL W P-5'-P-CCNC: 30 U/L (ref 0–45)
BILIRUB DIRECT SERPL-MCNC: 0.19 MG/DL (ref 0–0.3)
BILIRUB SERPL-MCNC: 0.5 MG/DL
BUN SERPL-MCNC: 14.1 MG/DL (ref 6–20)
CALCIUM SERPL-MCNC: 9.2 MG/DL (ref 8.8–10.4)
CHLORIDE SERPL-SCNC: 104 MMOL/L (ref 98–107)
CREAT SERPL-MCNC: 0.92 MG/DL (ref 0.67–1.17)
EGFRCR SERPLBLD CKD-EPI 2021: >90 ML/MIN/1.73M2
GLUCOSE SERPL-MCNC: 109 MG/DL (ref 70–99)
HCO3 SERPL-SCNC: 25 MMOL/L (ref 22–29)
POTASSIUM SERPL-SCNC: 4.2 MMOL/L (ref 3.4–5.3)
PROT SERPL-MCNC: 7.4 G/DL (ref 6.4–8.3)
SODIUM SERPL-SCNC: 141 MMOL/L (ref 135–145)

## 2025-03-10 PROCEDURE — 82248 BILIRUBIN DIRECT: CPT

## 2025-03-10 PROCEDURE — 3077F SYST BP >= 140 MM HG: CPT

## 2025-03-10 PROCEDURE — 3079F DIAST BP 80-89 MM HG: CPT

## 2025-03-10 PROCEDURE — 36415 COLL VENOUS BLD VENIPUNCTURE: CPT

## 2025-03-10 PROCEDURE — 80053 COMPREHEN METABOLIC PANEL: CPT

## 2025-03-10 PROCEDURE — 1125F AMNT PAIN NOTED PAIN PRSNT: CPT

## 2025-03-10 PROCEDURE — 99214 OFFICE O/P EST MOD 30 MIN: CPT

## 2025-03-10 PROCEDURE — G2211 COMPLEX E/M VISIT ADD ON: HCPCS

## 2025-03-10 SDOH — HEALTH STABILITY: PHYSICAL HEALTH: ON AVERAGE, HOW MANY DAYS PER WEEK DO YOU ENGAGE IN MODERATE TO STRENUOUS EXERCISE (LIKE A BRISK WALK)?: 4 DAYS

## 2025-03-10 ASSESSMENT — SOCIAL DETERMINANTS OF HEALTH (SDOH): HOW OFTEN DO YOU GET TOGETHER WITH FRIENDS OR RELATIVES?: ONCE A WEEK

## 2025-03-10 ASSESSMENT — PAIN SCALES - GENERAL: PAINLEVEL_OUTOF10: SEVERE PAIN (8)

## 2025-03-10 ASSESSMENT — PATIENT HEALTH QUESTIONNAIRE - PHQ9
SUM OF ALL RESPONSES TO PHQ QUESTIONS 1-9: 10
SUM OF ALL RESPONSES TO PHQ QUESTIONS 1-9: 10
10. IF YOU CHECKED OFF ANY PROBLEMS, HOW DIFFICULT HAVE THESE PROBLEMS MADE IT FOR YOU TO DO YOUR WORK, TAKE CARE OF THINGS AT HOME, OR GET ALONG WITH OTHER PEOPLE: SOMEWHAT DIFFICULT

## 2025-03-10 NOTE — PROGRESS NOTES
Preventive Care Visit  [facility] GREGGJUANITA Pfeiffer CNP, Family Medicine  Mar 10, 2025  {Provider  Link to SmartSet :366172}    {PROVIDER CHARTING PREFERENCE:443200}    Subjective   Les is a 59 year old, presenting for the following:  Physical        3/10/2025     9:03 AM   Additional Questions   Roomed by VANDANA         3/10/2025     9:03 AM   Patient Reported Additional Medications   Patient reports taking the following new medications NO   {ROOMER positive Fall Risk- Gait Speed Test is required click here to document the Gait Speed Test and then refresh the note to pull in results  :839268}  {ROOMER if patient is in their first year of Medicare a vision screen is required click here to document the Vison screen and then refresh the note to pull in results  :196136}      HPI  ***   {MA/LPN/RN Pre-Provider Visit Orders- hCG/UA/Strep (Optional):931075}  {SUPERLIST (Optional):864138}  {additonal problems for provider to add (Optional):974142}  Advance Care Planning  Patient does not have a Health Care Directive: {ADVANCE_DIRECTIVE_STATUS:837531}      3/10/2025   General Health   How would you rate your overall physical health? (!) POOR   Feel stress (tense, anxious, or unable to sleep) Very much   (!) STRESS CONCERN      3/10/2025   Nutrition   Diet: Regular (no restrictions)         3/10/2025   Exercise   Days per week of moderate/strenous exercise 4 days         3/10/2025   Social Factors   Frequency of gathering with friends or relatives Once a week   Worry food won't last until get money to buy more No   Food not last or not have enough money for food? No   Do you have housing? (Housing is defined as stable permanent housing and does not include staying ouside in a car, in a tent, in an abandoned building, in an overnight shelter, or couch-surfing.) Yes   Are you worried about losing your housing? No   Lack of transportation? No   Unable to get utilities (heat,electricity)? No          3/10/2025   Fall Risk   Fallen 2 or more times in the past year? Yes   Trouble with walking or balance? No     {Positive Fall Risk- Gait Speed Test is required and was not documented before note was started.  If results do not appear, ask staff to complete.  Once completed, refresh note to pull in results Click here to link Gait Speed Test  :112518}      3/10/2025   Activities of Daily Living- Home Safety   Needs help with the following daily activites None of the above   Safety concerns in the home None of the above         3/10/2025   Dental   Dentist two times every year? (!) NO         3/10/2025   Hearing Screening   Hearing concerns? (!) I NEED TO ASK PEOPLE TO SPEAK UP OR REPEAT THEMSELVES.    (!) IT'S HARD TO FOLLOW A CONVERSATION IN A NOISY RESTAURANT OR CROWDED ROOM.    (!) TROUBLE UNDERSTANDING SOFT OR WHISPERED SPEECH.       Multiple values from one day are sorted in reverse-chronological order         3/10/2025   Driving Risk Screening   Patient/family members have concerns about driving No         3/10/2025   General Alertness/Fatigue Screening   Have you been more tired than usual lately? No         3/10/2025   Urinary Incontinence Screening   Bothered by leaking urine in past 6 months No          Today's PHQ-9 Score:       3/10/2025     8:55 AM   PHQ-9 SCORE   PHQ-9 Total Score MyChart 10 (Moderate depression)   PHQ-9 Total Score 10        Patient-reported         3/10/2025   Substance Use   Alcohol more than 3/day or more than 7/wk Not Applicable   Do you have a current opioid prescription? (!) YES   How severe/bad is pain from 1 to 10? 9/10   Do you use any other substances recreationally? No   {Provider  Link to Opioid Risk Tool  Assess risk of opioid use disorder :859164}  {AWV REQUIRED- Pull in ORT Results:619592}  Social History     Tobacco Use    Smoking status: Never     Passive exposure: Never    Smokeless tobacco: Former     Types: Chew     Quit date: 12/12/2019    Tobacco  comments:     still chews nicotine gum   Vaping Use    Vaping status: Never Used   Substance Use Topics    Alcohol use: No     Comment: none    Drug use: Yes     Frequency: 7.0 times per week     Types: Marijuana     Comment: medicinal (oral)     {Provider  If there are gaps in the social history shown above, please follow the link to update and then refresh the note Link to Social and Substance History :054084}  Last PSA:   PSA   Date Value Ref Range Status   07/01/2016 0.37 0 - 4 ug/L Final     ASCVD Risk   The 10-year ASCVD risk score (Chauncey BANDA, et al., 2019) is: 12.2%    Values used to calculate the score:      Age: 59 years      Sex: Male      Is Non- : No      Diabetic: No      Tobacco smoker: No      Systolic Blood Pressure: 143 mmHg      Is BP treated: Yes      HDL Cholesterol: 36 mg/dL      Total Cholesterol: 200 mg/dL    {Link to Fracture Risk Assessment Tool (Optional):422118}    {Provider  REQUIRED FOR AWV Use the storyboard to review patient history, after sections have been marked as reviewed, refresh note to capture documentation:648658}  {Provider   REQUIRED AWV use this link to review and update sexual activity history  after section has been marked as reviewed, refresh note to capture documentation:417282}  Reviewed and updated as needed this visit by Provider                    {HISTORY OPTIONS (Optional):188043}  Current providers sharing in care for this patient include:  Patient Care Team:  Minda Monzon APRN CNP as PCP - General (Family Medicine)  Doretha Magallanes MD as MD (Anesthesiology)  Asif Luu DO as MD (Family Medicine - Sports Medicine)  Fredo Hughes MD as MD (Orthopedics)  Barbie Christie, PhD LP as Psychologist (Licensed Mental Health)  Susana Pike APRN CNP as Referring Physician (Nurse Practitioner)  Susana Pike APRN CNP as Assigned Neuroscience Provider  Susana Pike APRN CNP as  Assigned Pain Medication Provider  Leti Quiroz AuD as Audiologist (Audiology)  Daniel Plasencia MD as MD (Otolaryngology)  Zain Hernández MD as Resident (Student in organized health care education/training program)  Haim Quezada PsyD as Assigned Behavioral Health Provider  Daniel Plasencia MD as Assigned Surgical Provider  Minda Monzon APRN CNP as Assigned PCP  Juan Novak MD as MD (Rheumatology)  Juan Novak MD as Assigned Rheumatology Provider  Selena Dodge RPH as Pharmacist (Pharmacist Ambulatory Care)  Selena Dodge RPH as Assigned [facility] Pharmacist    The following health maintenance items are reviewed in Epic and correct as of today:  Health Maintenance   Topic Date Due    HEPATITIS A IMMUNIZATION (1 of 2 - Risk 2-dose series) Never done    HEPATITIS B IMMUNIZATION (1 of 3 - 19+ 3-dose series) Never done    HEPATIC PANEL  03/01/2025    DEPRESSION 6 MO INDEX REPEAT PHQ-9  03/24/2025    URINE DRUG SCREEN  05/20/2025    CONTROLLED SUBSTANCE AGREEMENT FOR CHRONIC PAIN MANAGEMENT  05/20/2025    COVID-19 Vaccine (6 - Moderna risk 2024-25 season) 07/22/2025    BMP  08/02/2025    JEFF ASSESSMENT  08/30/2025    LIPID  01/22/2026    ANNUAL REVIEW OF HM ORDERS  01/22/2026    MEDICARE ANNUAL WELLNESS VISIT  03/10/2026    PHQ-9  03/10/2026    GLUCOSE  08/02/2027    COLORECTAL CANCER SCREENING  05/09/2028    ADVANCE CARE PLANNING  01/22/2030    DTAP/TDAP/TD IMMUNIZATION (2 - Td or Tdap) 09/12/2031    HEPATITIS C SCREENING  Completed    HIV SCREENING  Completed    DEPRESSION ACTION PLAN  Completed    INFLUENZA VACCINE  Completed    Pneumococcal Vaccine: 50+ Years  Completed    ZOSTER IMMUNIZATION  Completed    HPV IMMUNIZATION  Aged Out    MENINGITIS IMMUNIZATION  Aged Out    TSH W/FREE T4 REFLEX  Discontinued    LUNG CANCER SCREENING  Discontinued       {ROS Picklists (Optional):375548}     Objective    Exam  BP (!) 143/80 (BP Location: Left arm, Patient Position: Sitting, Cuff  "Size: Adult Large)   Pulse 55   Temp 98  F (36.7  C) (Oral)   Resp 11   Ht 1.91 m (6' 3.2\")   Wt 134.9 kg (297 lb 6.4 oz)   SpO2 96%   BMI 36.98 kg/m     Estimated body mass index is 36.98 kg/m  as calculated from the following:    Height as of this encounter: 1.91 m (6' 3.2\").    Weight as of this encounter: 134.9 kg (297 lb 6.4 oz).    Physical Exam  {Exam Choices (Optional):926041}  {Provider  The Mini-Cog is incomplete, use link to complete and refresh note Link to Mini-Cog :336912}      1/22/2025   Mini Cog   Clock Draw Score 2 Normal   3 Item Recall 2 objects recalled   Mini Cog Total Score 4     {A Mini-Cog total score of 0-2 suggests the possibility of dementia, score of 3-5 suggests no dementia:592409}         Signed Electronically by: JUANITA Curran CNP  {Email feedback regarding this note to [email or web address]   :868211}  Answers submitted by the patient for this visit:  Patient Health Questionnaire (Submitted on 3/10/2025)  If you checked off any problems, how difficult have these problems made it for you to do your work, take care of things at home, or get along with other people?: Somewhat difficult  PHQ9 TOTAL SCORE: 10    "

## 2025-03-10 NOTE — PATIENT INSTRUCTIONS
At [facility], we strive to deliver an exceptional experience to you, every time we see you. If you receive a survey, please let us know what we are doing well and/or what we could improve upon, as we do value your feedback.  If you have MyChart, you can expect to receive results automatically within 24 hours of their completion.  Your provider will send a note interpreting your results as well.   If you do not have MyChart, you should receive your results in about a week by mail.    Your care team:                            Family Medicine Internal Medicine   MD Paul Coy, MD Renata Bergman, MD Inocnecio Sue, MD Katarina London, PAErnieC    Meño Swan, MD Pediatrics   Nicolette Taylor, MD Mayra Mcmahon, MD Phoebe Jones, APRN CNP Dottie Morales APRN CNP   Jayme Gama, MD Paula Leija, MD Minda Monzon, CNP     Axel Dillon, CNP Same-Day Provider (No follow-up visits)   JUANITA Garcia, DNP Jailene Turcios, PA-C   JUANITA Garcia, FNP, BC JUAN RuanoC     Clinic hours: Monday - Thursday 7 am-6 pm; Fridays 7 am-5 pm.   Urgent care: Monday - Friday 10 am- 8 pm; Saturday and Sunday 9 am-5 pm.    Clinic: [phone]       [facility]: Monday - Thursday 8 am - 7 pm; Friday 8 am - 6 pm  [facility]: [phone]     Patient Education   Preventive Care Advice   This is general advice given by our system to help you stay healthy. However, your care team may have specific advice just for you. Please talk to your care team about your preventive care needs.  Nutrition  Eat 5 or more servings of fruits and vegetables each day.  Try wheat bread, brown rice and whole grain pasta (instead of white bread, rice, and pasta).  Get enough calcium and vitamin D. Check the label on foods and aim for 100% of the RDA (recommended daily allowance).  Lifestyle  Exercise at least 150  minutes each week  (30 minutes a day, 5 days a week).  Do muscle strengthening activities 2 days a week. These help control your weight and prevent disease.  No smoking.  Wear sunscreen to prevent skin cancer.  Have a dental exam and cleaning every 6 months.  Yearly exams  See your health care team every year to talk about:  Any changes in your health.  Any medicines your care team has prescribed.  Preventive care, family planning, and ways to prevent chronic diseases.  Shots (vaccines)   HPV shots (up to age 26), if you've never had them before.  Hepatitis B shots (up to age 59), if you've never had them before.  COVID-19 shot: Get this shot when it's due.  Flu shot: Get a flu shot every year.  Tetanus shot: Get a tetanus shot every 10 years.  Pneumococcal, hepatitis A, and RSV shots: Ask your care team if you need these based on your risk.  Shingles shot (for age 50 and up)  General health tests  Diabetes screening:  Starting at age 35, Get screened for diabetes at least every 3 years.  If you are younger than age 35, ask your care team if you should be screened for diabetes.  Cholesterol test: At age 39, start having a cholesterol test every 5 years, or more often if advised.  Bone density scan (DEXA): At age 50, ask your care team if you should have this scan for osteoporosis (brittle bones).  Hepatitis C: Get tested at least once in your life.  STIs (sexually transmitted infections)  Before age 24: Ask your care team if you should be screened for STIs.  After age 24: Get screened for STIs if you're at risk. You are at risk for STIs (including HIV) if:  You are sexually active with more than one person.  You don't use condoms every time.  You or a partner was diagnosed with a sexually transmitted infection.  If you are at risk for HIV, ask about PrEP medicine to prevent HIV.  Get tested for HIV at least once in your life, whether you are at risk for HIV or not.  Cancer screening tests  Cervical cancer screening:  If you have a cervix, begin getting regular cervical cancer screening tests starting at age 21.  Breast cancer scan (mammogram): If you've ever had breasts, begin having regular mammograms starting at age 40. This is a scan to check for breast cancer.  Colon cancer screening: It is important to start screening for colon cancer at age 45.  Have a colonoscopy test every 10 years (or more often if you're at risk) Or, ask your provider about stool tests like a FIT test every year or Cologuard test every 3 years.  To learn more about your testing options, visit:   .  For help making a decision, visit:   https://bit.ly/cu51113.  Prostate cancer screening test: If you have a prostate, ask your care team if a prostate cancer screening test (PSA) at age 55 is right for you.  Lung cancer screening: If you are a current or former smoker ages 50 to 80, ask your care team if ongoing lung cancer screenings are right for you.  For informational purposes only. Not to replace the advice of your health care provider. Copyright   2023 [employer]. All rights reserved. Clinically reviewed by the  [facility]. TastyKhana [id number] - REV 01/24.  Preventing Falls: Care Instructions  Injuries and health problems such as trouble walking or poor eyesight can increase your risk of falling. So can some medicines. But there are things you can do to help prevent falls. You can exercise to get stronger. You can also arrange your home to make it safer.    Talk to your doctor about the medicines you take. Ask if any of them increase the risk of falls and whether they can be changed or stopped.   Try to exercise regularly. It can help improve your strength and balance. This can help lower your risk of falling.         Practice fall safety and prevention.   Wear low-heeled shoes that fit well and give your feet good support. Talk to your doctor if you have foot problems that make this hard.  Carry a cellphone or  "wear a medical alert device that you can use to call for help.  Use stepladders instead of chairs to reach high objects. Don't climb if you're at risk for falls. Ask for help, if needed.  Wear the correct eyeglasses, if you need them.        Make your home safer.   Remove rugs, cords, clutter, and furniture from walkways.  Keep your house well lit. Use night-lights in hallways and bathrooms.  Install and use sturdy handrails on stairways.  Wear nonskid footwear, even inside. Don't walk barefoot or in socks without shoes.        Be safe outside.   Use handrails, curb cuts, and ramps whenever possible.  Keep your hands free by using a shoulder bag or backpack.  Try to walk in well-lit areas. Watch out for uneven ground, changes in pavement, and debris.  Be careful in the winter. Walk on the grass or gravel when sidewalks are slippery. Use de-icer on steps and walkways. Add non-slip devices to shoes.    Put grab bars and nonskid mats in your shower or tub and near the toilet. Try to use a shower chair or bath bench when bathing.   Get into a tub or shower by putting in your weaker leg first. Get out with your strong side first. Have a phone or medical alert device in the bathroom with you.   Where can you learn more?  Go to https://www.PEX Card.net/patiented  Enter G117 in the search box to learn more about \"Preventing Falls: Care Instructions.\"  Current as of: July 31, 2024  Content Version: 14.3    2024 [employer].   Care instructions adapted under license by your healthcare professional. If you have questions about a medical condition or this instruction, always ask your healthcare professional. [employer] disclaims any warranty or liability for your use of this information.    Hearing Loss: Care Instructions  Overview     Hearing loss is a sudden or slow decrease in how well you hear. It can range from slight to profound. Permanent hearing loss can occur with aging. It also can happen when " you are exposed long-term to loud noise. Examples include listening to loud music, riding motorcycles, or being around other loud machines.  Hearing loss can affect your work and home life. It can make you feel lonely or depressed. You may feel that you have lost your independence. But hearing aids and other devices can help you hear better and feel connected to others.  Follow-up care is a key part of your treatment and safety. Be sure to make and go to all appointments, and call your doctor if you are having problems. It's also a good idea to know your test results and keep a list of the medicines you take.  How can you care for yourself at home?  Avoid loud noises whenever possible. This helps keep your hearing from getting worse.  Always wear hearing protection around loud noises.  Wear a hearing aid as directed.  A professional can help you pick a hearing aid that will work best for you.  You can also get hearing aids over the counter for mild to moderate hearing loss.  Have hearing tests as your doctor suggests. They can show whether your hearing has changed. Your hearing aid may need to be adjusted.  Use other devices as needed. These may include:  Telephone amplifiers and hearing aids that can connect to a television, stereo, radio, or microphone.  Devices that use lights or vibrations. These alert you to the doorbell, a ringing telephone, or a baby monitor.  Television closed-captioning. This shows the words at the bottom of the screen. Most new TVs can do this.  TTY (text telephone). This lets you type messages back and forth on the telephone instead of talking or listening. These devices are also called TDD. When messages are typed on the keyboard, they are sent over the phone line to a receiving TTY. The message is shown on a monitor.  Use text messaging, social media, and email if it is hard for you to communicate by telephone.  Try to learn a listening technique called speechreading. It is not  "lipreading. You pay attention to people's gestures, expressions, posture, and tone of voice. These clues can help you understand what a person is saying. Face the person you are talking to, and have them face you. Make sure the lighting is good. You need to see the other person's face clearly.  Think about counseling if you need help to adjust to your hearing loss.  When should you call for help?  Watch closely for changes in your health, and be sure to contact your doctor if:    You think your hearing is getting worse.     You have new symptoms, such as dizziness or nausea.   Where can you learn more?  Go to https://www.ReferBright.net/patiented  Enter R798 in the search box to learn more about \"Hearing Loss: Care Instructions.\"  Current as of: September 27, 2023  Content Version: 14.3    2024 [email or web address].   Care instructions adapted under license by your healthcare professional. If you have questions about a medical condition or this instruction, always ask your healthcare professional. [email or web address] disclaims any warranty or liability for your use of this information.    Learning About Stress  What is stress?     Stress is your body's response to a hard situation. Your body can have a physical, emotional, or mental response. Stress is a fact of life for most people, and it affects everyone differently. What causes stress for you may not be stressful for someone else.  A lot of things can cause stress. You may feel stress when you go on a job interview, take a test, or run a race. This kind of short-term stress is normal and even useful. It can help you if you need to work hard or react quickly. For example, stress can help you finish an important job on time.  Long-term stress is caused by ongoing stressful situations or events. Examples of long-term stress include long-term health problems, ongoing problems at work, or conflicts in your family. Long-term stress can harm your health.  How does " stress affect your health?  When you are stressed, your body responds as though you are in danger. It makes hormones that speed up your heart, make you breathe faster, and give you a burst of energy. This is called the fight-or-flight stress response. If the stress is over quickly, your body goes back to normal and no harm is done.  But if stress happens too often or lasts too long, it can have bad effects. Long-term stress can make you more likely to get sick, and it can make symptoms of some diseases worse. If you tense up when you are stressed, you may develop neck, shoulder, or low back pain. Stress is linked to high blood pressure and heart disease.  Stress also harms your emotional health. It can make you smith, tense, or depressed. Your relationships may suffer, and you may not do well at work or school.  What can you do to manage stress?  You can try these things to help manage stress:   Do something active. Exercise or activity can help reduce stress. Walking is a great way to get started. Even everyday activities such as housecleaning or yard work can help.  Try yoga or luis chi. These techniques combine exercise and meditation. You may need some training at first to learn them.  Do something you enjoy. For example, listen to music or go to a movie. Practice your hobby or do volunteer work.  Meditate. This can help you relax, because you are not worrying about what happened before or what may happen in the future.  Do guided imagery. Imagine yourself in any setting that helps you feel calm. You can use online videos, books, or a teacher to guide you.  Do breathing exercises. For example:  From a standing position, bend forward from the waist with your knees slightly bent. Let your arms dangle close to the floor.  Breathe in slowly and deeply as you return to a standing position. Roll up slowly and lift your head last.  Hold your breath for just a few seconds in the standing position.  Breathe out slowly and  "bend forward from the waist.  Let your feelings out. Talk, laugh, cry, and express anger when you need to. Talking with supportive friends or family, a counselor, or a rhea leader about your feelings is a healthy way to relieve stress. Avoid discussing your feelings with people who make you feel worse.  Write. It may help to write about things that are bothering you. This helps you find out how much stress you feel and what is causing it. When you know this, you can find better ways to cope.  What can you do to prevent stress?  You might try some of these things to help prevent stress:  Manage your time. This helps you find time to do the things you want and need to do.  Get enough sleep. Your body recovers from the stresses of the day while you are sleeping.  Get support. Your family, friends, and community can make a difference in how you experience stress.  Limit your news feed. Avoid or limit time on social media or news that may make you feel stressed.  Do something active. Exercise or activity can help reduce stress. Walking is a great way to get started.  Where can you learn more?  Go to https://www.Vontu.net/patiented  Enter N032 in the search box to learn more about \"Learning About Stress.\"  Current as of: October 24, 2023  Content Version: 14.3    2024 [employer].   Care instructions adapted under license by your healthcare professional. If you have questions about a medical condition or this instruction, always ask your healthcare professional. [employer] disclaims any warranty or liability for your use of this information.    Learning About Depression Screening  What is depression screening?  Depression screening is a way to see if you have depression symptoms. It may be done by a doctor or counselor. It's often part of a routine checkup. That's because your mental health is just as important as your physical health.  Depression is a mental health condition that affects how " "you feel, think, and act. You may:  Have less energy.  Lose interest in your daily activities.  Feel sad and grouchy for a long time.  Depression is very common. It affects people of all ages.  Many things can lead to depression. Some people become depressed after they have a stroke or find out they have a major illness like cancer or heart disease. The death of a loved one or a breakup may lead to depression. It can run in families. Most experts believe that a combination of inherited genes and stressful life events can cause it.  What happens during screening?  You may be asked to fill out a form about your depression symptoms. You and the doctor will discuss your answers. The doctor may ask you more questions to learn more about how you think, act, and feel.  What happens after screening?  If you have symptoms of depression, your doctor will talk to you about your options.  Doctors usually treat depression with medicines or counseling. Often, combining the two works best. Many people don't get help because they think that they'll get over the depression on their own. But people with depression may not get better unless they get treatment.  The cause of depression is not well understood. There may be many factors involved. But if you have depression, it's not your fault.  A serious symptom of depression is thinking about death or suicide. If you or someone you care about talks about this or about feeling hopeless, get help right away.  It's important to know that depression can be treated. Medicine, counseling, and self-care may help.  Where can you learn more?  Go to https://www.Carbonetworks.net/patiented  Enter T185 in the search box to learn more about \"Learning About Depression Screening.\"  Current as of: July 31, 2024  Content Version: 14.3    2024 [employer].   Care instructions adapted under license by your healthcare professional. If you have questions about a medical condition or this " instruction, always ask your healthcare professional. [employer] disclaims any warranty or liability for your use of this information.    Chronic Pain: Care Instructions  Your Care Instructions     Chronic pain is pain that lasts a long time (months or even years) and may or may not have a clear cause. It is different from acute pain, which usually does have a clear cause--like an injury or illness--and gets better over time. Chronic pain:  Lasts over time but may vary from day to day.  Does not go away despite efforts to end it.  May disrupt your sleep and lead to fatigue.  May cause depression or anxiety.  May make your muscles tense, causing more pain.  Can disrupt your work, hobbies, home life, and relationships with friends and family.  Chronic pain is a very real condition. It is not just in your head. Treatment can help and usually includes several methods used together, such as medicines, physical therapy, exercise, and other treatments. Learning how to relax and changing negative thought patterns can also help you cope.  Chronic pain is complex. Taking an active role in your treatment will help you better manage your pain. Tell your doctor if you have trouble dealing with your pain. You may have to try several things before you find what works best for you.  Follow-up care is a key part of your treatment and safety. Be sure to make and go to all appointments, and call your doctor if you are having problems. It's also a good idea to know your test results and keep a list of the medicines you take.  How can you care for yourself at home?  Pace yourself. Break up large jobs into smaller tasks. Save harder tasks for days when you have less pain, or go back and forth between hard tasks and easier ones. Take rest breaks.  Relax, and reduce stress. Relaxation techniques such as deep breathing or meditation can help.  Keep moving. Gentle, daily exercise can help reduce pain over the long run. Try low-  or no-impact exercises such as walking, swimming, and stationary biking. Do stretches to stay flexible.  Try heat, cold packs, and massage.  Get enough sleep. Chronic pain can make you tired and drain your energy. Talk with your doctor if you have trouble sleeping because of pain.  Think positive. Your thoughts can affect your pain level. Do things that you enjoy to distract yourself when you have pain instead of focusing on the pain. See a movie, read a book, listen to music, or spend time with a friend.  If you think you are depressed, talk to your doctor about treatment.  Keep a daily pain diary. Record how your moods, thoughts, sleep patterns, activities, and medicine affect your pain. You may find that your pain is worse during or after certain activities or when you are feeling a certain emotion. Having a record of your pain can help you and your doctor find the best ways to treat your pain.  Take pain medicines exactly as directed.  If the doctor gave you a prescription medicine for pain, take it as prescribed.  If you are not taking a prescription pain medicine, ask your doctor if you can take an over-the-counter medicine.  Reducing constipation caused by pain medicine  Talk to your doctor about a laxative. If a laxative doesn't work, your doctor may suggest a prescription medicine.  Include fruits, vegetables, beans, and whole grains in your diet each day. These foods are high in fiber.  If your doctor recommends it, get more exercise. Walking is a good choice. Bit by bit, increase the amount you walk every day. Try for at least 30 minutes on most days of the week.  Schedule time each day for a bowel movement. A daily routine may help. Take your time and do not strain when having a bowel movement.  When should you call for help?   Call your doctor now or seek immediate medical care if:    Your pain gets worse or is out of control.     You feel down or blue, or you do not enjoy things like you once did. You  "may be depressed, which is common in people with chronic pain. Depression can be treated.     You have vomiting or cramps for more than 2 hours.   Watch closely for changes in your health, and be sure to contact your doctor if:    You cannot sleep because of pain.     You are very worried or anxious about your pain.     You have trouble taking your pain medicine.     You have any concerns about your pain medicine.     You have trouble with bowel movements, such as:  No bowel movement in 3 days.  Blood in the anal area, in your stool, or on the toilet paper.  Diarrhea for more than 24 hours.   Where can you learn more?  Go to https://www.Frugoton.net/patiented  Enter N004 in the search box to learn more about \"Chronic Pain: Care Instructions.\"  Current as of: July 31, 2024  Content Version: 14.3    2024 [employer].   Care instructions adapted under license by your healthcare professional. If you have questions about a medical condition or this instruction, always ask your healthcare professional. [employer] disclaims any warranty or liability for your use of this information.       "

## 2025-03-10 NOTE — PROGRESS NOTES
"  {PROVIDER CHARTING PREFERENCE:630598}    Subjective   Les is a 59 year old, presenting for the following health issues:  Physical      3/10/2025     9:03 AM   Additional Questions   Roomed by VANDANA         3/10/2025     9:03 AM   Patient Reported Additional Medications   Patient reports taking the following new medications NO     HPI      {MA/LPN/RN Pre-Provider Visit Orders- hCG/UA/Strep (Optional):823959}  {SUPERLIST (Optional):421185}  {additonal problems for provider to add (Optional):332539}    Review of Systems  Constitutional, HEENT, cardiovascular, pulmonary, gi and gu systems are negative, except as otherwise noted.      Objective    BP (!) 143/80 (BP Location: Left arm, Patient Position: Sitting, Cuff Size: Adult Large)   Pulse 55   Temp 98  F (36.7  C) (Oral)   Resp 11   Ht 1.91 m (6' 3.2\")   Wt 134.9 kg (297 lb 6.4 oz)   SpO2 96%   BMI 36.98 kg/m    Body mass index is 36.98 kg/m .    Physical Exam   {Exam List (Optional):452556}    {Diagnostic Test Results (Optional):769618}      Signed Electronically by: JUANITA Curran CNP    " "is  slightly reduced due to slightly submaximal heart rate (77% MPHR.)     The target heart rate was not achieved. Exercise was terminated after 9:46 at  a HR of 126 (77% MPHR) and a workload of 150W due to fatigue. No angina was  elicited.  Blunted heart rate and hypertensive BP response to exercise.  No ECG evidence of ischemia.  Functional capacity is normal for age.     Normal biventricular size, thickness, and global systolic function at  baseline, LVEF=60-65%.  With exercise, LVEF increased to >70% and LV cavity size decreased  appropriately.  No regional wall motion abnormalities are present at rest or with exercise.  No significant valvular abnormalities are noted on screening Doppler exam.  The aortic root and visualized ascending aorta are normal.    Zio Patch:   Patient had a min HR of 43 bpm, max HR of 134 bpm, and avg HR of 60 bpm. Predominant underlying rhythm was Sinus Rhythm. First Degree AV Block was present. 1 run of Supraventricular Tachycardia occurred lasting 4 beats with a max rate of 125 bpm (avg 111 bpm). Isolated SVEs were rare (<1.0%), SVE Couplets were rare (<1.0%), and SVE Triplets were rare (<1.0%). Isolated VEs were rare (<1.0%), VE Couplets were rare (<1.0%), and no VE Triplets were present.     Symptoms were associated with sinus rhythm and with ventricular ectopy    Review of Systems  Constitutional, HEENT, cardiovascular, pulmonary, gi and gu systems are negative, except as otherwise noted.      Objective    BP (!) 143/80 (BP Location: Left arm, Patient Position: Sitting, Cuff Size: Adult Large)   Pulse 55   Temp 98  F (36.7  C) (Oral)   Resp 11   Ht 1.91 m (6' 3.2\")   Wt 134.9 kg (297 lb 6.4 oz)   SpO2 96%   BMI 36.98 kg/m    Body mass index is 36.98 kg/m .    Physical Exam   GENERAL: alert and no distress  NECK: no adenopathy, no asymmetry, masses, or scars  RESP: lungs clear to auscultation - no rales, rhonchi or wheezes  CV: regular rate and rhythm, normal S1 S2, no S3 or " S4, no murmur, click or rub, no peripheral edema  ABDOMEN: soft, nontender, no hepatosplenomegaly, no masses and bowel sounds normal  MS: no gross musculoskeletal defects noted, no edema  PSYCH: mentation appears normal, affect normal/bright      Signed Electronically by: JUANITA Curran CNP

## 2025-03-24 ENCOUNTER — MYC MEDICAL ADVICE (OUTPATIENT)
Dept: PALLIATIVE MEDICINE | Facility: CLINIC | Age: 60
End: 2025-03-24
Payer: COMMERCIAL

## 2025-03-24 DIAGNOSIS — M45.7 ANKYLOSING SPONDYLITIS OF LUMBOSACRAL REGION (H): ICD-10-CM

## 2025-03-24 DIAGNOSIS — F11.20 UNCOMPLICATED OPIOID DEPENDENCE (H): ICD-10-CM

## 2025-03-24 DIAGNOSIS — M47.817 SPONDYLOSIS WITHOUT MYELOPATHY OR RADICULOPATHY, LUMBOSACRAL REGION: ICD-10-CM

## 2025-03-24 DIAGNOSIS — M05.79 RHEUMATOID ARTHRITIS INVOLVING MULTIPLE SITES WITH POSITIVE RHEUMATOID FACTOR (H): ICD-10-CM

## 2025-03-24 DIAGNOSIS — M25.50 MULTIPLE JOINT PAIN: ICD-10-CM

## 2025-03-24 DIAGNOSIS — M51.360 DEGENERATION OF INTERVERTEBRAL DISC OF LUMBAR REGION WITH DISCOGENIC BACK PAIN: ICD-10-CM

## 2025-03-24 DIAGNOSIS — F11.90 CHRONIC, CONTINUOUS USE OF OPIOIDS: ICD-10-CM

## 2025-03-24 DIAGNOSIS — M54.59 LUMBAR FACET JOINT PAIN: ICD-10-CM

## 2025-03-25 ENCOUNTER — TELEPHONE (OUTPATIENT)
Dept: CARDIOLOGY | Facility: CLINIC | Age: 60
End: 2025-03-25
Payer: COMMERCIAL

## 2025-03-25 RX ORDER — BUPRENORPHINE 8 MG/1
8 TABLET SUBLINGUAL 3 TIMES DAILY
Qty: 90 TABLET | Refills: 0 | Status: SHIPPED | OUTPATIENT
Start: 2025-03-25

## 2025-03-25 NOTE — TELEPHONE ENCOUNTER
Please process a refill of buprenorphine (SUBUTEX) 8 MG SUBL sublingual tablet  to     [facility] #[id number] - [place], [place] - [address] [facility]  [address]   [place] [address]  Phone: [phone] Fax: [phone]

## 2025-03-25 NOTE — TELEPHONE ENCOUNTER
Signed Prescriptions:                        Disp   Refills    buprenorphine (SUBUTEX) 8 MG SUBL sublingu*90 tab*0        Sig: Place 1 tablet (8 mg) under the tongue 3 times daily.           Fill/start 03/25/25.  30 day script for chronic           pain  Authorizing Provider: SUSANA PETTY        Reviewed [place]  March 25, 2025- no concerning fills.    Susana GRANT, RN CNP, FNP  [facility]  [facility]

## 2025-03-25 NOTE — TELEPHONE ENCOUNTER
Medication refill information reviewed.     Due date for buprenorphine (SUBUTEX) 8 MG SUBL sublingual tablet  is 03/25/25     Prescriptions prepped for review.     Will route to provider.

## 2025-03-25 NOTE — TELEPHONE ENCOUNTER
This encounter is being sent to inform the clinic that this patient has a referral from [facility] for the diagnoses of Exercise intolerance and Hyperlipidemia and has requested that this patient be seen within 30 days and/or next available.  Based on the availability of our provider(s), we are unable to accommodate this request.    Were all sites offered this patient?  Yes    Does scheduling algorithm request to schedule next available?  Patient has been scheduled for the first available opening with Dr Smith on June 2nd.  We have informed the patient that the clinic will review their referral and reach out if a sooner appointment is medically necessary.     Thank you!  [facility]

## 2025-03-25 NOTE — TELEPHONE ENCOUNTER
Appointment added to waitlist.    Ana Lu, RN, BSN  Cardiology RN Care Coordinator   [place]   Phone: [phone]  Fax: [phone] ([place]) [phone] ([place])

## 2025-03-25 NOTE — TELEPHONE ENCOUNTER
Received request for a refill(s) of     buprenorphine (SUBUTEX) 8 MG SUBL sublingual tablet        Last dispensed from pharmacy on 2/18/2025    Patient's last office/virtual visit by prescribing provider on 1/21/2025  Next office/virtual appointment scheduled for 4/21/2025    Last urine drug screen date 5/20/2024  Current opioid agreement on file (completed within the last year) Yes Date of opioid agreement: 5/24/2024    E-prescribe to [facility] #[id number] - [place], [place] - [address]  AT [facility]  pharmacy    Will route to [facility] for review and preparation of prescription(s).

## 2025-03-26 ENCOUNTER — MYC MEDICAL ADVICE (OUTPATIENT)
Dept: PALLIATIVE MEDICINE | Facility: CLINIC | Age: 60
End: 2025-03-26
Payer: COMMERCIAL

## 2025-03-26 DIAGNOSIS — M51.360 DEGENERATION OF INTERVERTEBRAL DISC OF LUMBAR REGION WITH DISCOGENIC BACK PAIN: ICD-10-CM

## 2025-03-26 DIAGNOSIS — M25.50 MULTIPLE JOINT PAIN: ICD-10-CM

## 2025-03-26 DIAGNOSIS — M05.79 RHEUMATOID ARTHRITIS INVOLVING MULTIPLE SITES WITH POSITIVE RHEUMATOID FACTOR (H): ICD-10-CM

## 2025-03-26 DIAGNOSIS — M54.59 LUMBAR FACET JOINT PAIN: ICD-10-CM

## 2025-03-26 DIAGNOSIS — M45.7 ANKYLOSING SPONDYLITIS OF LUMBOSACRAL REGION (H): ICD-10-CM

## 2025-03-26 DIAGNOSIS — F11.90 CHRONIC, CONTINUOUS USE OF OPIOIDS: ICD-10-CM

## 2025-03-26 RX ORDER — OXYCODONE HYDROCHLORIDE 5 MG/1
5 TABLET ORAL EVERY 6 HOURS PRN
Qty: 42 TABLET | Refills: 0 | Status: SHIPPED | OUTPATIENT
Start: 2025-03-26

## 2025-03-26 NOTE — TELEPHONE ENCOUNTER
Received request for a refill(s) of oxyCODONE (ROXICODONE) 5 MG tablet     Last dispensed from pharmacy on 1/21/2025    Patient's last office/virtual visit by prescribing provider on 1/21/2025  Next office/virtual appointment scheduled for 4/21/2025    Last urine drug screen date 5/20/2024  Current opioid agreement on file (completed within the last year) YesDate of opioid agreement: 5/20/2024    E-prescribe to [facility] #[id number] - [place], [place] - [address] [facility]  pharmacy    Will route to [facility] for review and preparation of prescription(s).

## 2025-03-26 NOTE — TELEPHONE ENCOUNTER
Please process a refill of oxycodone 5 MG  to     [facility] #[id number] - [place], [place] - [address] [facility]  [address]   [place] [address]  Phone: [phone] Fax: [phone]

## 2025-03-26 NOTE — TELEPHONE ENCOUNTER
Medication refill information reviewed. Please see MyChart below. He is scheduled for a repeat RFA with Dr Holley on 04/04/25    Due date for oxyCODONE (ROXICODONE) 5 MG tablet  is 03/26/25     Prescriptions prepped for review.     Will route to provider.

## 2025-03-27 ENCOUNTER — TELEPHONE (OUTPATIENT)
Dept: FAMILY MEDICINE | Facility: CLINIC | Age: 60
End: 2025-03-27
Payer: COMMERCIAL

## 2025-03-27 NOTE — TELEPHONE ENCOUNTER
Signed Prescriptions:                        Disp   Refills    oxyCODONE (ROXICODONE) 5 MG tablet         42 tab*0        Sig: Take 1 tablet (5 mg) by mouth every 6 hours as needed           for severe pain. Max of 3/day. Fill/begin           03/27/2025 Short term script for acute pain           flare.  Authorizing Provider: SUSANA PETTY        Reviewed [place]  March 26, 2025- no concerning fills.    Susana GRANT, RN CNP, FNP  [facility]  [facility]

## 2025-03-27 NOTE — TELEPHONE ENCOUNTER
Telephone Intake--REST  Date: 3/27/2025  Client Name:  Jailene Breen   MRN: [id number]  : 1965       Age:  59 year old  Does patient have Court Appointed/Legal Guardian?    Presenting Problem / Reason for Assessment   (Clinical History &Symptoms):     Decreased libido, pain with ejaculation      Length of time experiencing symptoms:  about 1 year     What is the Main Goal for the visit? (Examples: looking for physical cause of problem? Looking for medical options?  Understanding symptoms and treatments?) Looking for medical/physical causes and treatment, pt states these symptoms could be from medications he is taking    Have you seen another medical provider for this or a related issue? No  -- If yes, are you looking for a 2nd opinion or something else? N/A      Have you seen a Mental Health Provider for this issue? (if so, where):  Therapist:  No  --if yes, request a referral or summary letter from the therapist at the 1st session..    Psychiatrist:  No  --if yes,, request records from the provider at the 1st session..      Other Medical/Mental Health Diagnoses:    See chart    If needed, clarify if patient calling from group home or other supervised living arrangement  Note if patient has no mental health or cognitive diagnosis, but phone behavior suggests impairment      Medications:    Current Outpatient Medications   Medication Sig Dispense Refill    atorvastatin (LIPITOR) 40 MG tablet Take 1 tablet (40 mg) by mouth daily. 90 tablet 3    buprenorphine (SUBUTEX) 8 MG SUBL sublingual tablet Place 1 tablet (8 mg) under the tongue 3 times daily. Fill/start 25.  30 day script for chronic pain 90 tablet 0    docusate sodium (COLACE) 100 MG capsule Take 2 capsules (200 mg) by mouth 2 times daily 60 capsule 10    FLUoxetine (PROZAC) 40 MG capsule Take 2 capsules (80 mg) by mouth daily. 180 capsule 0    folic acid (FOLVITE) 1 MG tablet Take 3 tablets (3,000 mcg) by mouth daily. 270 tablet 2    losartan  (COZAAR) 25 MG tablet Take 1 tablet (25 mg) by mouth daily 90 tablet 3    medical cannabis (Patient's own supply) See Admin Instructions (The purpose of this order is to document that the patient reports taking medical cannabis.  This is not a prescription, and is not used to certify that the patient has a qualifying medical condition.)     Pills PRN   Lozenges PRN      methotrexate 2.5 MG tablet Take 8 tablets (20 mg) by mouth every 7 days. 96 tablet 3    naloxone (NARCAN) 4 MG/0.1ML nasal spray Spray 1 spray (4 mg) into one nostril alternating nostrils as needed for opioid reversal. every 2-3 minutes until assistance arrives 0.2 mL 1    omeprazole (PRILOSEC) 20 MG DR capsule Take 1 capsule (20 mg) by mouth daily. 90 capsule 1    ondansetron (ZOFRAN ODT) 4 MG ODT tab Take 1 tablet (4 mg) by mouth every 8 hours as needed for nausea 30 tablet 3    oxyCODONE (ROXICODONE) 5 MG tablet Take 1 tablet (5 mg) by mouth every 6 hours as needed for severe pain. Max of 3/day. Fill/begin 03/27/2025 Short term script for acute pain flare. 42 tablet 0    polyethylene glycol (MIRALAX) 17 GM/Dose powder Take 17 g (1 Capful) by mouth daily. 510 g 3    prazosin (MINIPRESS) 1 MG capsule Take 1 capsule (1 mg) by mouth at bedtime. 30 capsule 1    predniSONE (DELTASONE) 5 MG tablet Take 3 tabs daily for 1 week, then take 2 tabs daily for 1 week 35 tablet 1    QUEtiapine (SEROQUEL) 25 MG tablet Take 1 tablet (25 mg) by mouth at bedtime. May also take 1 tablet (25 mg) daily as needed (Anxiety). 45 tablet 0    tiZANidine (ZANAFLEX) 4 MG tablet Take 1 tablet (4 mg) by mouth 3 times daily as needed for muscle spasms. 3 month supply 225 tablet 1    tofacitinib (XELJANZ XR) 11 MG 24 hr tablet Take 1 tablet (11 mg) by mouth daily. Hold for signs of infection, then seek medical attention. 90 tablet 2    vitamin D2 (ERGOCALCIFEROL) 26767 units (1250 mcg) capsule Take 1 capsule (50,000 Units) by mouth once a week. 8 capsule 0     No current  facility-administered medications for this visit.           Primary Care Provider: Yes  Provider and Clinic Name:  [facility]    --If multiple medical conditions, request recent records from primary doctor at the 1st session..      Referral Source:  [facility]    Follow Up:      Pt scheduled for 3/31, all records and meds and problem list should be updated in chart, health history form emailed to pt     -Please Verify Registration  -Send necessary paperwork to pt's email    *Please let pt know that Dr. Aden will NOT prescribe medication or create a treatment plan until paperwork is received. If we do not have completed forms before your first session, additional appointments will be scheduled until we receive  completed forms.*

## 2025-04-02 NOTE — TELEPHONE ENCOUNTER
Pt scheduled for LRFA on 4/4 red flag:  Immunocompromised due to RA   Ok to proceed?     Patient requesting to be scheduled for: LRFA    The following red flags noted on patient screening:   Immunocompromised: due to RN, pt is taking xeljanz and methotrexate    Injection imaging orders Placed    Scheduled:  4/4/5    Routing:  flag information to Dr. Kishan Clement, RN

## 2025-04-04 ENCOUNTER — RADIOLOGY INJECTION OFFICE VISIT (OUTPATIENT)
Dept: PALLIATIVE MEDICINE | Facility: CLINIC | Age: 60
End: 2025-04-04
Attending: NURSE PRACTITIONER
Payer: COMMERCIAL

## 2025-04-04 VITALS — OXYGEN SATURATION: 96 % | DIASTOLIC BLOOD PRESSURE: 91 MMHG | SYSTOLIC BLOOD PRESSURE: 146 MMHG | HEART RATE: 72 BPM

## 2025-04-04 DIAGNOSIS — M51.360 DEGENERATION OF INTERVERTEBRAL DISC OF LUMBAR REGION WITH DISCOGENIC BACK PAIN: ICD-10-CM

## 2025-04-04 DIAGNOSIS — G89.18 POST PROCEDURE DISCOMFORT: Primary | ICD-10-CM

## 2025-04-04 DIAGNOSIS — M47.816 SPONDYLOSIS OF LUMBAR REGION WITHOUT MYELOPATHY OR RADICULOPATHY: ICD-10-CM

## 2025-04-04 DIAGNOSIS — M54.59 LUMBAR FACET JOINT PAIN: ICD-10-CM

## 2025-04-04 DIAGNOSIS — M47.817 SPONDYLOSIS WITHOUT MYELOPATHY OR RADICULOPATHY, LUMBOSACRAL REGION: ICD-10-CM

## 2025-04-04 PROCEDURE — 64636 DESTROY L/S FACET JNT ADDL: CPT | Mod: 50 | Performed by: PAIN MEDICINE

## 2025-04-04 PROCEDURE — 99152 MOD SED SAME PHYS/QHP 5/>YRS: CPT | Performed by: PAIN MEDICINE

## 2025-04-04 PROCEDURE — 64635 DESTROY LUMB/SAC FACET JNT: CPT | Mod: 50 | Performed by: PAIN MEDICINE

## 2025-04-04 PROCEDURE — 99153 MOD SED SAME PHYS/QHP EA: CPT | Performed by: PAIN MEDICINE

## 2025-04-04 RX ORDER — FENTANYL CITRATE 50 UG/ML
12.5-5 INJECTION, SOLUTION INTRAMUSCULAR; INTRAVENOUS EVERY 5 MIN PRN
Status: ACTIVE | OUTPATIENT
Start: 2025-04-04 | End: 2025-04-05

## 2025-04-04 RX ORDER — OXYCODONE HYDROCHLORIDE 5 MG/1
5 TABLET ORAL EVERY 12 HOURS PRN
Qty: 6 TABLET | Refills: 0 | Status: SHIPPED | OUTPATIENT
Start: 2025-04-04 | End: 2025-04-07

## 2025-04-04 RX ORDER — KETOROLAC TROMETHAMINE 30 MG/ML
15-30 INJECTION, SOLUTION INTRAMUSCULAR; INTRAVENOUS
Status: COMPLETED | OUTPATIENT
Start: 2025-04-04 | End: 2025-04-04

## 2025-04-04 RX ADMIN — FENTANYL CITRATE 25 MCG: 50 INJECTION, SOLUTION INTRAMUSCULAR; INTRAVENOUS at 10:55

## 2025-04-04 RX ADMIN — FENTANYL CITRATE 25 MCG: 50 INJECTION, SOLUTION INTRAMUSCULAR; INTRAVENOUS at 11:18

## 2025-04-04 RX ADMIN — FENTANYL CITRATE 25 MCG: 50 INJECTION, SOLUTION INTRAMUSCULAR; INTRAVENOUS at 10:44

## 2025-04-04 RX ADMIN — FENTANYL CITRATE 25 MCG: 50 INJECTION, SOLUTION INTRAMUSCULAR; INTRAVENOUS at 11:03

## 2025-04-04 RX ADMIN — KETOROLAC TROMETHAMINE 15 MG: 30 INJECTION, SOLUTION INTRAMUSCULAR; INTRAVENOUS at 11:26

## 2025-04-04 ASSESSMENT — PAIN SCALES - GENERAL
PAINLEVEL_OUTOF10: SEVERE PAIN (9)
PAINLEVEL_OUTOF10: SEVERE PAIN (9)

## 2025-04-04 NOTE — PATIENT INSTRUCTIONS
[facility]   Radiofrequency Ablation (RFA) Discharge Instructions     Today you saw:    Dr. Aguilar Holley    You should anticipate procedural pain for up to 2 weeks.   It may take up to 8 weeks to receive relief from the RFA   You may receive a prescription for pain medication and you should take this as directed.   Follow up with your provider (Susana GRANT CNP) in 6-8 weeks.   If you received sedation before, during or after your procedure, for the next 24 hours you shall NOT:    -Drive    -Operate machinery    -Drink alcohol    -Sign any legal documents   You may resume your normal diet   You may resume your regular medications after the procedure   If you were holding your blood thinning medication, please restart taking it: N/A  Be cautious with walking. Numbness and/or weakness in the lower extremities may occur for up to 6-8 hours due to effect of local anesthetic  Avoid strenuous activity for the first 24 hours   You may resume your regular activities after 24 hours   You may shower, however no swimming, tub baths or hot tubs for 24 hours following your procedure   You may use ice packs 10-15 minutes three to four times a day at the injection site for comfort   Do not use heat to painful areas for 6 to 8 hours. This will give the local anesthetic time to wear off and prevent you from accidentally burning your skin.   Unless you have been directed to avoid the use of anti-inflammatory medications (NSAIDS), you may use medications such as ibuprofen, Aleve or Tylenol for pain control if needed.   If you experience any of the following, call the Pain Clinic during work hours (Monday through Friday 8 am-4:30 pm) at [phone] or the Provider Line after hours at [phone]:   -Fever over 100 degree F    -Swelling, bleeding, redness, drainage, warmth at the injection site    -Progressive weakness or numbness in your legs   -Loss of bowel or bladder function   -Unusual  new onset of pain that is not improving

## 2025-04-04 NOTE — NURSING NOTE
22 gauge Peripheral IV inserted into left anticubital - attempts: 1      MD Time IN: 1040   Sedation start time:  1045  Sedation end time:  1125    Medications given: fentanyl 100 mcg IV; versed 2 mg IV; toradol 15 mg IV  Intravenous fluids were administered, normal saline 20 cc's.  Sedation Level Achieved:  Moderate (conscious) sedation

## 2025-04-04 NOTE — PROGRESS NOTES
Pre procedure Diagnosis: lumbosacral spondylosis  Post procedure Diagnosis: Same  Procedure performed: Bilateral lumbosacral radiofrequency ablation at L4, L5, sacral ala, fluoroscopically guided  Anesthesia:        22 gauge Peripheral IV inserted into left anticubital - attempts: 1        MD Time IN: 1040   Sedation start time:  1045  Sedation end time:  1125     Medications given: fentanyl 100 mcg IV; versed 2 mg IV; toradol 15 mg IV          Complications: none  Operators: Aguilar Holley MD     Indications:   Jailene Breen is a 59 year old male. The patient has a history of bilateral lbp.  Exam shows + kemps and they have tried conservative treatment including meds/pt/prior rfa with benefit .      Options/alternatives, benefits and risks were discussed with the patient including bleeding, infection, no pain relief, tissue trauma, exposure to radiation, reaction to medications including seizure, spinal cord injury,increased pain after the procedure, weakness, numbness or sensory changes and headache.   We also discussed risks of sedation, including reaction to medications and cardiovascular collapse.    Questions were answered to her satisfaction and she agrees to proceed. Voluntary informed consent was obtained and signed.     Vitals were reviewed: Yes  Allergies were reviewed:  Yes   Medications were reviewed:  Yes   Pre-procedure pain score: 6/10    Procedure:  After obtaining signed, informed consent, the patient was brought into the procedure suite and was placed in a prone position on the procedure table.   A Pause for the Cause was performed.  Patient was prepped and draped in sterile fashion.     PT had an IV line placed prior the procedure.  The C-arm was positioned in the left oblique view to afford optimal view of the L4-L5 vertebral bodies. Lidocaine 1% was used to anesthetize the skin at each level.  At each level, a 10cm, 20G curved radiofrequency cannula with a 10mm active tip was positioned,  overlying the intersection of the transverse process and pedicle at L4 & L5, and was advanced under intermittent fluoroscopy until it contacted the transverse process and notch, and the tip slightly overran that process, just lateral to the mamillary process.  The position of each cannula was verified and optimized in the oblique view and AP views.    In the AP view, another cannula was placed at the sacral alar notch.      Each position was tested for motor and sensory stimulation, and was positioned so that stimulation was negative for stimuli outside the immediate area of the desired lesion.  Sensory stimulation was completed at 50 Hz, with max stimulation up to 0.3V.  Motor stimulation was completed at 2Hz, up to 1.5V.   Bupivacaine 0.5 % 1ml was injected at each level.  After allowing the local anesthetic to set up, a 90 second, 80 degree Celsius lesion was generated at all three levels.    The needles were then rotated within the pathway of the medial branch, and locations were evaluated with repeat imaging.  A second lesion was then generated at each location.    The procedure was then repeated on the right side, using the same technique as described above.  Sensory stimulation was positive at 0.3 V and motor stimulation was negative at 1.5 V except for multifidus movement.    The needles were flushed with the local anesthetic as they were withdrawn.        Hemostasis was achieved, the area was cleaned, and bandaids were placed when appropriate.  The patient tolerated the procedure well, and was taken to the recovery room.    Images were saved to PACS.        Post-procedure pain score: 910  Follow-up includes:     -post-procedure pain medications: oxycodone 5mg bid disp 6  -f/u with referring provider 8 weeks    Aguilar Holley MD  [facility]

## 2025-04-04 NOTE — NURSING NOTE
Pre-procedure Intake  If YES to any questions or NO to having a   Please complete laminated checklist and leave on the computer keyboard for Provider, verbally inform provider if able.    For SCS Trial, RFA's or any sedation procedure:  Have you been fasting? Yes  If yes, for how long? More than 6 hours    Are you taking any any blood thinners such as Coumadin, Warfarin, Jantoven, Pradaxa Xarelto, Eliquis, Edoxaban, Enoxaparin, Lovenox, Heparin, Arixtra, Fondaparinux, or Fragmin? OR Antiplatelet medication such as Plavix, Brilinta, or Effient?   No   If yes, when did you take your last dose? NA    Do you take aspirin?  No  If cervical procedure, have you held aspirin for 6 days?   NA    Is the Pt taking any GLP-1 Antagonist (hold needed for sedation patients only)  (semaglutide (Ozempic, Wegovy), dulaglutide (Trulicity), exenatide ER (Bydureon), tirzepatide (Mounjaro), Liraglutide (Saxenda, Victoza), semaglutide (Rybelsus)     NA  If yes, when did you take your last dose? NA    Do you have any allergies to contrast dye, iodine, steroid and/or numbing medications?  NO    Are you currently taking antibiotics or have an active infection?  NO    Have you had a fever/elevated temperature within the past week? NO    Are you currently taking oral steroids? NO    Do you have a ? Yes    Are you pregnant or breastfeeding?  Not Applicable    Have you received any vaccinations in the last week? NO    Notify provider and RNs if systolic BP >170, diastolic BP >100, P >100 or O2 sats < 90%    Monica Yu MA

## 2025-04-08 DIAGNOSIS — M06.9 RHEUMATOID ARTHRITIS INVOLVING MULTIPLE SITES, UNSPECIFIED WHETHER RHEUMATOID FACTOR PRESENT (H): ICD-10-CM

## 2025-04-08 DIAGNOSIS — E78.5 DYSLIPIDEMIA: ICD-10-CM

## 2025-04-08 DIAGNOSIS — F32.A DEPRESSION, UNSPECIFIED DEPRESSION TYPE: ICD-10-CM

## 2025-04-08 DIAGNOSIS — M62.838 MUSCLE SPASM: ICD-10-CM

## 2025-04-08 DIAGNOSIS — M79.18 MYOFASCIAL PAIN: ICD-10-CM

## 2025-04-08 RX ORDER — ATORVASTATIN CALCIUM 40 MG/1
40 TABLET, FILM COATED ORAL DAILY
Qty: 90 TABLET | Refills: 3 | Status: SHIPPED | OUTPATIENT
Start: 2025-04-08

## 2025-04-08 NOTE — TELEPHONE ENCOUNTER
Received fax request from [facility] pharmacy requesting refill(s) for tiZANidine (ZANAFLEX) 4 MG tablet     Last refilled on no recent dispenses    Pt last seen on 01/21/25  Next appt scheduled for 04/21/25    Will facilitate refill.

## 2025-04-08 NOTE — TELEPHONE ENCOUNTER
Signed Prescriptions:                        Disp   Refills    tiZANidine (ZANAFLEX) 4 MG tablet          225 ta*1        Sig: Take 1 tablet (4 mg) by mouth 3 times daily as needed           for muscle spasms. 3 month supply  Authorizing Provider: SUSANA PETTY    Reviewed most recent hepatic function checked 3/10/2025 which is WNL.     Susana GRANT, RN CNP, FNP  [facility]  [facility]

## 2025-04-14 RX ORDER — FOLIC ACID 1 MG/1
3000 TABLET ORAL DAILY
Qty: 270 TABLET | Refills: 2 | Status: SHIPPED | OUTPATIENT
Start: 2025-04-14

## 2025-04-14 RX ORDER — METHOTREXATE 2.5 MG/1
20 TABLET ORAL
Qty: 96 TABLET | Refills: 1 | Status: SHIPPED | OUTPATIENT
Start: 2025-04-14

## 2025-04-14 NOTE — TELEPHONE ENCOUNTER
folic acid (FOLVITE) 1 MG tablet  3,000 mcg, DAILY           Summary: Take 3 tablets (3,000 mcg) by mouth daily., Disp-270 tablet, R-2, E-Prescribe  Dose, Route, Frequency: 3,000 mcg, Oral, DAILYStart: 03/04/2025Ord/Sold: 03/04/2025 (O)Ordered On: 03/04/2025Pharmacy: [facility][id number] - [place], [place] [address]: Taking: Long-term: Med Note:              Patient Sig: Take 3 tablets (3,000 mcg) by mouth daily.  Ordering Department: [facility]  Authorized By: Juan Novak MD  Dispense: 270 tablet  Refills: 2 ordered     Refill sent to [facility] pharmacy  methotrexate 2.5 MG tablet  20 mg, EVERY 7 DAYS           Summary: Take 8 tablets (20 mg) by mouth every 7 days., Disp-96 tablet, R-3, E-Prescribe  Dose, Route, Frequency: 20 mg, Oral, EVERY 7 DAYSStart: 03/04/2025Ord/Sold: 03/04/2025 (O)Ordered On: 03/04/2025Pharmacy: [facility] #[id number] - [place], [place] [address]: Taking: Long-term: Med Note:              Patient Sig: Take 8 tablets (20 mg) by mouth every 7 days.  Ordering Department: [facility]  Authorized By: Juan Novak MD  Dispense: 96 tablet  Refills: 3 ordered       Last Office Visit: 3/4/25 K[facility]  Future Office visit:  10/6/25    CBC RESULTS:   Recent Labs   Lab Test 01/22/25  0943   WBC 8.1   RBC 4.79   HGB 14.3   HCT 42.3   MCV 88   MCH 29.9   MCHC 33.8   RDW 13.4          Creatinine   Date Value Ref Range Status   03/10/2025 0.92 0.67 - 1.17 mg/dL Final   06/16/2021 1.09 0.66 - 1.25 mg/dL Final   ]    Liver Function Studies -   Recent Labs   Lab Test 03/10/25  1017   PROTTOTAL 7.4   ALBUMIN 4.3   BILITOTAL 0.5   ALKPHOS 120   AST 30   ALT 25       Routing refill request to provider for review/approval because: DMARD not on protocol/ Provider to authorize/ Was sent to different pharmacy 3/4/25  Rosetta YANES RN  [facility]/Red Flag Triage & Med Refill Team

## 2025-04-24 ASSESSMENT — PAIN SCALES - PAIN ENJOYMENT GENERAL ACTIVITY SCALE (PEG)
PEG_TOTALSCORE: 6
INTERFERED_GENERAL_ACTIVITY: 5
INTERFERED_ENJOYMENT_LIFE: 5
AVG_PAIN_PASTWEEK: 8

## 2025-04-29 ENCOUNTER — LAB (OUTPATIENT)
Dept: LAB | Facility: CLINIC | Age: 60
End: 2025-04-29
Payer: COMMERCIAL

## 2025-04-29 ENCOUNTER — OFFICE VISIT (OUTPATIENT)
Dept: PALLIATIVE MEDICINE | Facility: CLINIC | Age: 60
End: 2025-04-29
Attending: NURSE PRACTITIONER
Payer: COMMERCIAL

## 2025-04-29 VITALS — DIASTOLIC BLOOD PRESSURE: 80 MMHG | SYSTOLIC BLOOD PRESSURE: 143 MMHG | HEART RATE: 69 BPM

## 2025-04-29 DIAGNOSIS — F11.90 CHRONIC, CONTINUOUS USE OF OPIOIDS: ICD-10-CM

## 2025-04-29 DIAGNOSIS — F11.20 UNCOMPLICATED OPIOID DEPENDENCE (H): ICD-10-CM

## 2025-04-29 DIAGNOSIS — Z79.891 ENCOUNTER FOR LONG-TERM USE OF OPIATE ANALGESIC: ICD-10-CM

## 2025-04-29 DIAGNOSIS — M54.59 LUMBAR FACET JOINT PAIN: ICD-10-CM

## 2025-04-29 DIAGNOSIS — M05.79 RHEUMATOID ARTHRITIS INVOLVING MULTIPLE SITES WITH POSITIVE RHEUMATOID FACTOR (H): ICD-10-CM

## 2025-04-29 DIAGNOSIS — M51.360 DEGENERATION OF INTERVERTEBRAL DISC OF LUMBAR REGION WITH DISCOGENIC BACK PAIN: ICD-10-CM

## 2025-04-29 DIAGNOSIS — M25.561 CHRONIC PAIN OF RIGHT KNEE: Primary | ICD-10-CM

## 2025-04-29 DIAGNOSIS — M25.50 MULTIPLE JOINT PAIN: ICD-10-CM

## 2025-04-29 DIAGNOSIS — G89.29 CHRONIC PAIN OF RIGHT KNEE: Primary | ICD-10-CM

## 2025-04-29 DIAGNOSIS — M47.817 SPONDYLOSIS WITHOUT MYELOPATHY OR RADICULOPATHY, LUMBOSACRAL REGION: ICD-10-CM

## 2025-04-29 DIAGNOSIS — M45.7 ANKYLOSING SPONDYLITIS OF LUMBOSACRAL REGION (H): ICD-10-CM

## 2025-04-29 LAB
CREAT UR-MCNC: 147 MG/DL
ETHANOL UR QL SCN: NORMAL

## 2025-04-29 PROCEDURE — 1125F AMNT PAIN NOTED PAIN PRSNT: CPT | Performed by: NURSE PRACTITIONER

## 2025-04-29 PROCEDURE — 3079F DIAST BP 80-89 MM HG: CPT | Performed by: NURSE PRACTITIONER

## 2025-04-29 PROCEDURE — 99000 SPECIMEN HANDLING OFFICE-LAB: CPT

## 2025-04-29 PROCEDURE — 80307 DRUG TEST PRSMV CHEM ANLYZR: CPT

## 2025-04-29 PROCEDURE — 99214 OFFICE O/P EST MOD 30 MIN: CPT | Performed by: NURSE PRACTITIONER

## 2025-04-29 PROCEDURE — 3077F SYST BP >= 140 MM HG: CPT | Performed by: NURSE PRACTITIONER

## 2025-04-29 PROCEDURE — G2211 COMPLEX E/M VISIT ADD ON: HCPCS | Performed by: NURSE PRACTITIONER

## 2025-04-29 PROCEDURE — 80307 DRUG TEST PRSMV CHEM ANLYZR: CPT | Mod: 90

## 2025-04-29 RX ORDER — BUPRENORPHINE 8 MG/1
8 TABLET SUBLINGUAL 3 TIMES DAILY
Qty: 90 TABLET | Refills: 0 | Status: SHIPPED | OUTPATIENT
Start: 2025-04-29

## 2025-04-29 ASSESSMENT — PAIN SCALES - GENERAL: PAINLEVEL_OUTOF10: SEVERE PAIN (8)

## 2025-04-29 NOTE — LETTER
Opioid / Opioid Plus Controlled Substance Agreement    This is an agreement between you and your provider about the safe and appropriate use of controlled substance/opioids prescribed by your care team. Controlled substances are medicines that can cause physical and mental dependence (abuse).    There are strict laws about having and using these medicines. We here at [facility] are committing to working with you in your efforts to get better. To support you in this work, we ll help you schedule regular office appointments for medicine refills. If we must cancel or change your appointment for any reason, we ll make sure you have enough medicine to last until your next appointment.     As a Provider, I will:  Listen carefully to your concerns and treat you with respect.   Recommend a treatment plan that I believe is in your best interest. This plan may involve therapies other than opioid pain medication.   Talk with you often about the possible benefits, and the risk of harm of any medicine that we prescribe for you.   Provide a plan on how to taper (discontinue or go off) using this medicine if the decision is made to stop its use.    As a Patient, I understand that opioid(s):   Are a controlled substance prescribed by my care team to help me function or work and manage my condition(s).   Are strong medicines and can cause serious side effects such as:  Drowsiness, which can seriously affect my driving ability  A lower breathing rate, enough to cause death  Harm to my thinking ability   Depression   Abuse of and addiction to this medicine  Need to be taken exactly as prescribed. Combining opioids with certain medicines or chemicals (such as illegal drugs, sedatives, sleeping pills, and benzodiazepines) can be dangerous or even fatal. If I stop opioids suddenly, I may have severe withdrawal symptoms.  Do not work for all types of pain nor for all patients. If they re not helpful, I may be asked to stop  them.        The risks, benefits and side effects of these medicine(s) were explained to me. I agree that:  I will take part in other treatments as advised by my care team. This may be psychiatry or counseling, physical therapy, behavioral therapy, group treatment or a referral to a specialist.     I will keep all my appointments. I understand that this is part of the monitoring of opioids. My care team may require an office visit for EVERY opioid/controlled substance refill. If I miss appointments or don t follow instructions, my care team may stop my medicine.    I will take my medicines as prescribed. I will not change the dose or schedule unless my care team tells me to. There will be no refills if I run out early.     I may be asked to come to the clinic and complete a urine drug test or complete a pill count at any time. If I don t give a urine sample or participate in a pill count, the care team may stop my medicine.    I will only receive prescriptions from this clinic for chronic pain. If I am treated by another provider for acute pain issues, I will tell them that I am taking opioid pain medication for chronic pain and that I have a treatment agreement with this provider. I will inform my [facility] care team within one business day if I am given a prescription for any pain medication by another healthcare provider. My [facility] care team can contact other providers and pharmacists about my use of any medicines.    It is up to me to make sure that I don t run out of my medicines on weekends or holidays. If my care team is willing to refill my opioid prescription without a visit, I must request refills only during office hours. Refills may take up to 3 business days to process. I will use one pharmacy to fill all my opioid and other controlled substance prescriptions. I will notify the clinic about any changes to my insurance or medication availability.    I am responsible for my  prescriptions. If the medicine/prescription is lost, stolen or destroyed, it will not be replaced. I also agree not to share controlled substance medicines with anyone.    I am aware I should not use any illegal or recreational drugs. I agree not to drink alcohol unless my care team says I can.       If I enroll in the [place] [facility] program, I will tell my care team prior to my next refill.     I will tell my care team right away if I become pregnant, have a new medical problem treated outside of my regular clinic, or have a change in my medications.    I understand that this medicine can affect my thinking, judgment and reaction time. Alcohol and drugs affect the brain and body, which can affect the safety of my driving. Being under the influence of alcohol or drugs can affect my decision-making, behaviors, personal safety, and the safety of others. Driving while impaired (DWI) can occur if a person is driving, operating, or in physical control of a car, motorcycle, boat, snowmobile, ATV, motorbike, off-road vehicle, or any other motor vehicle ([place] Statute 169A.20). I understand the risk if I choose to drive or operate any vehicle or machinery.    I understand that if I do not follow any of the conditions above, my prescriptions or treatment may be stopped or changed.          Opioids  What You Need to Know    What are opioids?   Opioids are pain medicines that must be prescribed by a doctor. They are also known as narcotics.     Examples are:   morphine (MS Contin, Raven)  oxycodone (Oxycontin)  oxycodone and acetaminophen (Percocet)  hydrocodone and acetaminophen (Vicodin, Norco)   fentanyl patch (Duragesic)   hydromorphone (Dilaudid)   methadone  codeine (Tylenol #3)     What do opioids do well?   Opioids are best for severe short-term pain such as after a surgery or injury. They may work well for cancer pain. They may help some people with long-lasting (chronic) pain.     What do opioids NOT do  well?   Opioids never get rid of pain entirely, and they don t work well for most patients with chronic pain. Opioids don t reduce swelling, one of the causes of pain.                                    Other ways to manage chronic pain and improve function include:     Treat the health problem that may be causing pain  Anti-inflammation medicines, which reduce swelling and tenderness, such as ibuprofen (Advil, Motrin) or naproxen (Aleve)  Acetaminophen (Tylenol)  Antidepressants and anti-seizure medicines, especially for nerve pain  Topical treatments such as patches or creams  Injections or nerve blocks  Chiropractic or osteopathic treatment  Acupuncture, massage, deep breathing, meditation, visual imagery, aromatherapy  Use heat or ice at the pain site  Physical therapy   Exercise  Stop smoking  Take part in therapy       Risks and side effects     Talk to your doctor before you start or decide to keep taking opioids. Possible side effects include:    Lowering your breathing rate enough to cause death  Overdose, including death, especially if taking higher than prescribed doses  Worse depression symptoms; less pleasure in things you usually enjoy  Feeling tired or sluggish  Slower thoughts or cloudy thinking  Being more sensitive to pain over time; pain is harder to control  Trouble sleeping or restless sleep  Changes in hormone levels (for example, less testosterone)  Changes in sex drive or ability to have sex  Constipation  Unsafe driving  Itching and sweating  Dizziness  Nausea, throwing up and dry mouth    What else should I know about opioids?    Opioids may lead to dependence, tolerance, or addiction.    Dependence means that if you stop or reduce the medicine too quickly, you will have withdrawal symptoms. These include loose poop (diarrhea), jitters, flu-like symptoms, nervousness and tremors. Dependence is not the same as addiction.                     Tolerance means needing higher doses over time to  get the same effect. This may increase the chance of serious side effects.    Addiction is when people improperly use a substance that harms their body, their mind or their relations with others. Use of opiates can cause a relapse of addiction if you have a history of drug or alcohol abuse.    People who have used opioids for a long time may have a lower quality of life, worse depression, higher levels of pain and more visits to doctors.    You can overdose on opioids. Take these steps to lower your risk of overdose:    Recognize the signs:  Signs of overdose include decrease or loss of consciousness (blackout), slowed breathing, trouble waking up and blue lips. If someone is worried about overdose, they should call 911.    Talk to your doctor about Narcan (naloxone).   If you are at risk for overdose, you may be given a prescription for Narcan. This medicine very quickly reverses the effects of opioids.   If you overdose, a friend or family member can give you Narcan while waiting for the ambulance. They need to know the signs of overdose and how to give Narcan.     Don't use alcohol or street drugs.   Taking them with opioids can cause death.    Do not take any of these medicines unless your doctor says it s OK. Taking these with opioids can cause death:  Benzodiazepines, such as lorazepam (Ativan), alprazolam (Xanax) or diazepam (Valium)  Muscle relaxers, such as cyclobenzaprine (Flexeril)  Sleeping pills like zolpidem (Ambien)   Other opioids      How to keep you and other people safe while taking opioids:    Never share your opioids with others.  Opioid medicines are regulated by the Drug Enforcement Agency ([employer]). Selling or sharing medications is a criminal act.    2. Be sure to store opioids in a secure place, locked up if possible. Young children can easily swallow them and overdose.    3. When you are traveling with your medicines, keep them in the original bottles. If you use a pill box, be sure you also  carry a copy of your medicine list from your clinic or pharmacy.    4. Safe disposal of opioids    Most pharmacies have places to get rid of medicine, called disposal kiosks. Medicine disposal options are also available in every [facility]. Search your county and  medication disposal  to find more options. You can find more details at:  [email or web address]     I agree that my provider, clinic care team, and pharmacy may work with any city, state or federal law enforcement agency that investigates the misuse, sale, or other diversion of my controlled medicine. I will allow my provider to discuss my care with, or share a copy of, this agreement with any other treating provider, pharmacy or emergency room where I receive care.    I have read this agreement and have asked questions about anything I did not understand.    _______________________________________________________  Patient Signature - Jailene Breen _____________________                   Date     _______________________________________________________  Provider Signature - JUANITA Ga CNP   _____________________                   Date     _______________________________________________________  Witness Signature (required if provider not present while patient signing)   _____________________                   Date

## 2025-04-29 NOTE — PATIENT INSTRUCTIONS
Plan:    Physical Therapy:  none  Check out Shmuel Maldonadoin on YouTube. , easy, gentle exercises 30-90 seconds with excellent technique  Clinical Health Psychologist: continue work with your psychiatrist and therapist  Diagnostic Studies:  None  Medication Management:    continue Subutex 8mg  three times per day Next script fill and begin 4/29/2025  Continue medical cannabis, re-certified today, there is no longer a registration fee  continue tizanidine 2-4mg three times daily as needed for muscle spasms  Further procedures recommended: none  UDT today, last took Subutex this morning. No recent alcohol consumption  Re-signed CSA  Recommendations to PCP: see above  Referral to [facility] for right knee pain   Follow up:12 weeks in-person or virtual. Please call [phone] to make your follow-up appointment with me.     ----------------------------------------------------------------  Clinic Number:  [phone]   Call with any questions about your care and for scheduling assistance.   Calls are returned Monday through Friday between 8 AM and 4:30 PM. We usually get back to you within 2 business days depending on the issue/request.    If we are prescribing your medications:  For opioid medication refills, call the clinic or send a e-channel message 7 days in advance.  Please include:  Name of requested medication  Name of the pharmacy.  For non-opioid medications, call your pharmacy directly to request a refill. Please allow 3-4 days to be processed.   Per [place] State Law:  All controlled substance prescriptions must be filled within 30 days of being written.    For those controlled substances allowing refills, pickup must occur within 30 days of last fill.      We believe regular attendance is key to your success in our program!    Any time you are unable to keep your appointment we ask that you call us at least 24 hours in advance to cancel.This will allow us to offer the appointment time to another  patient.   Multiple missed appointments may lead to dismissal from the clinic.

## 2025-04-29 NOTE — PROGRESS NOTES
[facility]      4/29/2025        Chief complaint:   -he is not feeling well since Sunday, more joint pain and a migraine  -multiple joint pain from RA   -bilateral low back pain, no radiation to the legs  -right knee has been particularly bad          Interval history:   Jailene Breen is a 59 year old male is known to me for   Lumbar spondylosis, pain is worse with extension and rotation indicating a facetogenic component to pain  Lumbar degenerative disc disease  SI joint pain  Ankylosing spondylitis  Myofascial pain  Chronic pain syndrome  PMHx includes: Panic attacks, back pain, arthritis, anxiety, ankylosing spondylitis  PSHx includes: Right knee surgery ×2, left foot surgery right knee arthroscopy        Recommendations/plan at the last visit on 1/21/2025 included:  Physical Therapy:  Referral to PHYSICAL THERAPY  Check out Shmuel Murray on YouTube. , easy, gentle exercises 30-90 seconds with excellent technique  Clinical Health Psychologist: continue work with your psychiatrist and therapist  Diagnostic Studies:  None  Medication Management:    continue Subutex 8mg  three times per day Next script fill 12/11 and start 12/13  Short script of oxycodone max 3/day. #42 fill and start 11/18  Continue medical cannabis  continue tizanidine 2-4mg three times daily as needed for muscle spasms  Further procedures recommended: none  Recommendations to PCP: see above   Follow up:12 weeks in-person/virtual. Please call [phone] to make your follow-up appointment with me.         Since his last visit, Jailene Breen reports:    Interval history April 29, 2025  Les reports   -he is not feeling well since Sunday, more joint pain and a migraine  -multiple joint pain from RA   -bilateral low back pain, no radiation to the legs  -right knee has been particularly bad  -right knee pain is the worst, open to seeing [facility] for possible joint injection. Denies popping,  "cracking, snapping, locking. Had one episode where his knee gave out and he nearly fell down  -wife had knee replaced  -had to put a pet down (14 year old dog)  -other dogs are sad  -he has been going on walks with the dogs  -he has lost about 6 lbs    Interval history January 21, 2025  -he fell out of bed and fell on his back, knocked the wind out of him. This happened at the end of December 2024. Had contacted me re: oxycodone script, had not given this due to no evaluation.   -states his low back pain is the same pain he has always had, just more intense. Still worse with lumbar extension and extension/rotation when loading the facet joints. He has had benefit from lumbar RFA, last done 4/11/2024.   -he does try to move around the house. He has not done any of the moves with Shmuel Murray, this was again discussed.   -will have him repeat lumbar RFA, order placed.    -his wife will be having her other knee replaced likely toward end of February.   -multiple joint pain is also worse with markedly colder weather (today is -40 degrees F)   -went to ER on 1/3/2025 for chest pain, left without being seen as he says that they seemed to blame it on panic disorder without any workup.   -has appt with his Primary Care Provider tomorrow and will discuss the intermittent chest heaviness.   -has a cracked tooth, encouraged to call dentist to get this fixed to avoid possible future infection, patient verbalized understanding.     Interval history November 18, 2024  -had been doing pretty well  -his son just got engaged and he bought a house  -Les was checking out the home and he slipped on the stairs and  tweaked his back.   -discussed possible use of Tumeric or massage therapy for acute on chronic pain.   -discussed adding in exercise in the \"shorts\" videos by Shmuel Murray online on YouTube  -Jamison has ongoing pain in the following areas:  -Bilateral low back pain right > Left. Denies radiation into the legs  -Hands, " "wrists, shoulders, elbows, and bilateral knee pain  -Joint pain is the most bothersome from RA  -seeing new rheumatologist, likes them  -slipped on stairs at son's new home and tweaked back    Interval history August 20, 2024  -he has really struggled the last 2 months.  -his bedroom is all torn up as it is leaking water. He is trying to do some of the work himself.   -this is stressful for him as it activity is different than he is used to.   -his back and his hips are bothering him due to increased activity.    -he tries to avoid use of prednisone as it makes him feel more anxious and can lead to panic attacks.   -he has known RA and Ankylosing Spondylitis  -he also has axial low back pain  -he has looked up Shmuel Murray but has not done any of his short exercises.   -he states he is supposed to have a stress test in the future  -notes his weight is getting close to 300 lbs. Discussed referral to comprehensive weight management program, patient interested.     Interval history May 20, 2024  -his bilateral low back pain without radiation has improved a little bit  -pain is worse after using the riding .  -helping his wife, she had knee replacement surgery.   Bilateral low back pain right > Left. Denies radiation into the legs  -Hands, wrists, shoulders, elbows, and bilateral knee pain  -Joint pain is the most bothersome from RA  -seeing new rheumatologist    Interval history February 19, 2024  -he has not been sleeping well.   -he has issues with nausea, he will feel hungry and then when he gets his food he will feel nausea and \"feel pukey.\"  -his wife had knee replacement surgery about 2 weeks ago.  -they have a dog that was recently fixed and he has been the one caring for the dog.   -he does use medical cannabis and he does feel that this is somewhat helpful for his nausea and helpful for pain.   -he is not on anything for reducing acid production in his gut, he states that his nausea and vomiting " "feels very acidic.   -he desires a repeat of lumbar RFA as his low back pain is worse, especially with lumbar extension and ext/rotation to the right and to the left.   -has been transitioned to Xeljanz but has not been able to take it regularly due to not feeling well.          At this point, the patient's participation with our multidisciplinary team includes:  The patient has been compliant with the program.  PT - last PHYSICAL THERAPY with Asif Saldana on 12/23/2020  Health Psych - worked with  Padilla Keene, last on 11/6/2018.  Working with Jonna Farrell PhD and been seen >8 times. Last visit with Santa was on 9/2/2020         Pain scores:    Pain intensity on average is 6-7 on a scale of 0-10.    Range is 4-9/10.   Pain right now is 8/10.   Pain is described as \"sharp, throbbing, stabbing, burning, stiffness.\"  Pain is constant in nature      Current pain relevant medications:      -nortriptyline 50mg at bedtime (helpful)  -ibuprofen 800mg TID PRN (using 3 times daily-helpful)  -Tylenol 500mg using 1000mg TID (unsure if helpful)  -Maxalt 10mg PRN migraine (helpful for migraine--he does have visual aura)  -naloxone nasal spray PRN for accidental opiate overdose  -Subutex 8 mg TID (somewhat helpful)  -medical cannabis PRN (helpful)      Other pertinent medications:   -Fluoxetine 80mg every day (somewhat helpful, managed by psychiatry)  -quetiapine 150mg at bedtime  -Xeljanz 11mg every 24 hours for RA (somewhat helpful)      Previous Medications:   OPIATES: Oxycodone (helpful), Fentanyl (100mcg/hr patch helpful), hydrocodone (itching), Tramadol (not helpful), Suboxone (somewhat helpful)  NSAIDS: ibuprofen (not helpful), Aleve (not helpful)  MUSCLE RELAXANTS: methocarbamol (somewhat helpful), Flexeril (somewhat helpful but caused severe urinary retention requiring catheterization), tizanidine (unsure if helpful), metaxalone (unsure), SOMA (helpful), chlorzoxazone (helped some)  ANTI-MIGRAINE MEDS: Maxalt " (helpful for migraine), Imitrex injection (somewhat helpful)  ANTI-DEPRESSANTS: Prozac (helpful), Cymbalta (felt weird on it), nortriptyline (unsure if helpful), Buspar (unsure), Remeron (blacked out), bupropion (not helpful for smoking cessation)  SLEEP AIDS: Ambien (helpful)  ANTI-CONVULSANTS: gabapentin (felt odd on this medication, unsure of dose), Lyrica (not helpful for 4 months, unsure of dose), Topamax (not helpful, he felt weird on it)   TOPICALS: Lidocaine patches (not helpful), Voltaren gel (not helpful)  Other meds: Tylenol (unsure if helpful)        Other treatments have included:   Jailene JOAQUÍN Breen has been seen at a pain clinic in the past.  [facility]  PT: tried, not helpful  Chiropractic: tried, not helpful  Acupuncture: none  TENs Unit: tried, helpful         Injections:     -has had injections at outside clinics  -has had epidural injections for low back pain (one was helpful)  -he has had lumbar medial branch blocks at [facility] and he was going to have lumbar radiofrequency ablation (did not do this due to cost)  -4/3/2017 right LMBBs with Dr. Ashlyn Gamble (helpful)  -4/26/2017 right LMBBs with Dr. Ashlyn Gamble (helpful)  -5/31/2017 right L3, L4, L5 RFA with Dr. Ashlyn Gamble (good relief for over 6 months)  -3/15/2018 right L5 transforaminal MAKAYLA with Dr. Ashlyn Gamble (not helpful)  -5/10/2018 trigger point injections with Dr. Ashlyn Gamble(somewhat helpful).  -7/12/2018 Right L3, L4, L5 RFA with Dr. Ashlyn Gamble (helpful).  -9/20/2018 Left piriformis injections, bilateral gluteal trigger point with Dr. Gamble (helpful).  -1/31/2019 right L2-3, L3-4 and L4-5 facet joint injections with Dr. Ashlyn Gamble (somewhat helpful)  4/4/2019  right L3, L4, L5/sacral ala lumbar radiofrequency ablation with Dr. Gamble (helpful over right side)  6/28/2019 Bilateral facet joint injections at l4-5, L5-S1 with Dr. Holley (only helpful for 7 days)  -2/12/2020 right L4-R7-H3-sacral ALA RFA  "done with Dr. Ashlyn Gamble (helpful)  -8/26/2020 left L3-5 lumbar medial branch blocks #1 with Dr. Ashlyn Gamble (very helpful)  -11/11/2020 left L3-5 lumbar medial branch blocks #2 with Dr. Ashlyn Gamble  (helpful)   -2/1/2021 bilateral lumbar RFA L4-5 and L5-S1 with Dr. Ashlyn Gamble   (this \"helped a little bit\" and then he tweaked his back about 2 weeks after it was done and it wasn't helpful)  -09/22/2022 lumbosacral RFA L4, L5 and sacral ala with Dr. Aguilar Holley (helpful,, at least 50% relief, he has a RA flare right now, so harder to tell)  -7/20/2023 bilateral lumbar RFA at L4, L5 and Sacral ala with Dr. Aguilar Holley (helpful, has reduced pain by at least 50-60% in the lower back)  -4/11/2024 bilateral lumbar RFA at L4, L5 and S1 by Dr. Aguilar Holley (has reduced pain by at least 50% about 5 weeks post-procedure. Can take up to 8 weeks to reach maximum improvement)  -4/4/2025 bilateral lumbosacral radiofrequency ablation at L4, L5 and sacral ala with Dr. Aguilar Holley (at least 50% ongoing as of 4/28/2025)        THE 4 A's OF OPIOID MAINTENANCE ANALGESIA    Analgesia: Subutex is helpful    Activity: ADLs, doing some home projects due to water damage. Cares for his dogs    Adverse effects: none    Adherence to Rx protocol: yes          Side Effects: no side effect   Patient is using the medication as prescribed: YES     Medications:  Current Outpatient Medications   Medication Sig Dispense Refill    atorvastatin (LIPITOR) 40 MG tablet Take 1 tablet (40 mg) by mouth daily. 90 tablet 3    buprenorphine (SUBUTEX) 8 MG SUBL sublingual tablet Place 1 tablet (8 mg) under the tongue 3 times daily. Fill/start 03/25/25.  30 day script for chronic pain 90 tablet 0    docusate sodium (COLACE) 100 MG capsule Take 2 capsules (200 mg) by mouth 2 times daily 60 capsule 10    FLUoxetine (PROZAC) 40 MG capsule Take 2 capsules (80 mg) by mouth daily. 180 capsule 0    folic acid (FOLVITE) 1 MG tablet Take 3 " tablets (3,000 mcg) by mouth daily. 270 tablet 2    losartan (COZAAR) 25 MG tablet Take 1 tablet (25 mg) by mouth daily 90 tablet 3    medical cannabis (Patient's own supply) See Admin Instructions (The purpose of this order is to document that the patient reports taking medical cannabis.  This is not a prescription, and is not used to certify that the patient has a qualifying medical condition.)     Pills PRN   Lozenges PRN      methotrexate 2.5 MG tablet Take 8 tablets (20 mg) by mouth every 7 days. 96 tablet 1    naloxone (NARCAN) 4 MG/0.1ML nasal spray Spray 1 spray (4 mg) into one nostril alternating nostrils as needed for opioid reversal. every 2-3 minutes until assistance arrives 0.2 mL 1    omeprazole (PRILOSEC) 20 MG DR capsule Take 1 capsule (20 mg) by mouth daily. 90 capsule 1    ondansetron (ZOFRAN ODT) 4 MG ODT tab Take 1 tablet (4 mg) by mouth every 8 hours as needed for nausea 30 tablet 3    polyethylene glycol (MIRALAX) 17 GM/Dose powder Take 17 g (1 Capful) by mouth daily. 510 g 3    prazosin (MINIPRESS) 1 MG capsule Take 1 capsule (1 mg) by mouth at bedtime. 90 capsule 1    predniSONE (DELTASONE) 5 MG tablet Take 3 tabs daily for 1 week, then take 2 tabs daily for 1 week 35 tablet 1    QUEtiapine (SEROQUEL) 25 MG tablet Take 1 tablet (25 mg) by mouth at bedtime. May also take 1 tablet (25 mg) daily as needed (Anxiety). 45 tablet 0    tiZANidine (ZANAFLEX) 4 MG tablet Take 1 tablet (4 mg) by mouth 3 times daily as needed for muscle spasms. 3 month supply 225 tablet 1    tofacitinib (XELJANZ XR) 11 MG 24 hr tablet Take 1 tablet (11 mg) by mouth daily. Hold for signs of infection, then seek medical attention. 90 tablet 2    vitamin D2 (ERGOCALCIFEROL) 98122 units (1250 mcg) capsule Take 1 capsule (50,000 Units) by mouth once a week. 8 capsule 0    oxyCODONE (ROXICODONE) 5 MG tablet Take 1 tablet (5 mg) by mouth every 6 hours as needed for severe pain. Max of 3/day. Fill/begin 03/27/2025 Short term  script for acute pain flare. 42 tablet 0       Medical History: any changes in medical history since they were last seen? No    Social History:    Home situation: , has 2 grown children  Occupation/Schooling: currently unemployed, worked as a  (unemployed since 3/1/2017). 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  Uses nicotine gum  If yes, was cessation counseling offered?  None needed        Physical Exam:     Vital signs: BP (!) 143/80   Pulse 69      Behavioral observations:  Awake, alert, conversant and cooperative     Gait:  slow, uses single ended cane    Musculoskeletal exam:  strength grossly equal throughout  -right knee tenderness medial joint line and over pes anserine bursa  -positive crepitus right knee    Neuro exam:  deferred    Skin/vascular/autonomic:  No suspicious lesions on exposed skin.     Other:  na           [place] Prescription Monitoring Program:  Reviewed [facility] 4/29/2025- no concerning fills.  Susana GRANT, RN CNP, FNP  [facility]  [facility]          DIRE Score for selecting candidates for long term opioid analgesia for chronic pain:  Diagnosis  2  Intractablility  2  Risk    Psychological health  1    Chemical health  2    Reliability  2    Social support  2  Efficacy  1  Total DIRE Score = 12. Note that  7-13 predicts poor outcome (compliance and efficacy) from opioid prescribing; 14-21 predicts good outcome (compliance and efficacy)  from opioid prescribing        Assessment:    Chronic pain of the right knee  Lumbar facet joint pain  Spondylosis of lumbar region without myelopathy or radiculopathy  Degeneration of intervertebral disc of lumbar region with discogenic back pain  Ankylosing spondylitis of lumbosacral  region  Rheumatoid arthritis involving multiple joints with positive rheumatoid factor  Multiple joint pain  Uncomplicated opiate dependence  Chronic continuous use of opioids  Encounter for long-term use of opiate analgesic    Muscle spasm  Myofascial pain  UDT 4/29/2025  Signed CSA 4/29/2025  PMHx includes: Panic attacks, back pain, arthritis, anxiety, ankylosing spondylitis  PSHx includes: Right knee surgery ×2, left foot surgery right knee arthroscopy          Plan:    Physical Therapy:  none  Check out Shmuel Murray on YouTube. , easy, gentle exercises 30-90 seconds with excellent technique  Clinical Health Psychologist: continue work with your psychiatrist and therapist  Diagnostic Studies:  None  Medication Management:    continue Subutex 8mg  three times per day Next script fill and begin 4/29/2025  Continue medical cannabis, re-certified today, there is no longer a registration fee  continue tizanidine 2-4mg three times daily as needed for muscle spasms  Further procedures recommended: none  UDT today, last took Subutex this morning. No recent alcohol consumption  Re-signed CSA  Recommendations to PCP: see above  Referral to [facility] for right knee pain   Follow up:12 weeks in-person or virtual. Please call [phone] to make your follow-up appointment with me.         ASSESSMENT AND PLAN:  (M25.561,  G89.29) Chronic pain of right knee  (primary encounter diagnosis)  Comment:   Plan: Orthopedic  Referral, Adult Pain         Clinic Follow-Up Order            (M54.59) Lumbar facet joint pain  Comment:   Plan: buprenorphine (SUBUTEX) 8 MG SUBL sublingual         tablet, Adult Pain Clinic Follow-Up Order            (M47.817) Spondylosis without myelopathy or radiculopathy, lumbosacral region  Comment:   Plan: buprenorphine (SUBUTEX) 8 MG SUBL sublingual         tablet, Adult Pain Clinic Follow-Up Order            (M51.360) Degeneration of intervertebral disc of lumbar region with  discogenic back pain  Comment:   Plan: buprenorphine (SUBUTEX) 8 MG SUBL sublingual         tablet, Adult Pain Clinic Follow-Up Order            (M45.7) Ankylosing spondylitis of lumbosacral region (H)  Comment:   Plan: buprenorphine (SUBUTEX) 8 MG SUBL sublingual         tablet, Adult Pain Clinic Follow-Up Order            (M05.79) Rheumatoid arthritis involving multiple sites with positive rheumatoid factor (H)  Comment:   Plan: Orthopedic  Referral, buprenorphine         (SUBUTEX) 8 MG SUBL sublingual tablet, Adult         Pain Clinic Follow-Up Order            (M25.50) Multiple joint pain  Comment:   Plan: buprenorphine (SUBUTEX) 8 MG SUBL sublingual         tablet, Adult Pain Clinic Follow-Up Order            (F11.20) Uncomplicated opioid dependence (H)  Comment:   Plan: Ethanol urine, Ethyl Glucuronide Screen with         Reflex to Confirmation, Urine, Drug         Confirmation Panel Urine with Creatinine,         buprenorphine (SUBUTEX) 8 MG SUBL sublingual         tablet, Adult Pain Clinic Follow-Up Order            (F11.90) Chronic, continuous use of opioids  Comment:   Plan: Ethanol urine, Ethyl Glucuronide Screen with         Reflex to Confirmation, Urine, Drug         Confirmation Panel Urine with Creatinine,         buprenorphine (SUBUTEX) 8 MG SUBL sublingual         tablet, Adult Pain Clinic Follow-Up Order            (Z79.891) Encounter for long-term use of opiate analgesic  Comment:   Plan: Ethanol urine, Ethyl Glucuronide Screen with         Reflex to Confirmation, Urine, Drug         Confirmation Panel Urine with Creatinine, Adult        Pain Clinic Follow-Up Order              BILLING TIME DOCUMENTATION:   TOTAL TIME includes:   Time spent preparing to see the patient: 1 minutes (reviewing records and tests)  Time spend face to face with the patient: 22 minutes  Time spent ordering tests, medications, procedures and referrals: 0 minutes  Time spent Referring and communicating with other  healthcare professionals: 0 minutes  Documenting clinical information in Epic: 2 minutes    The total TIME spent on this patient on the day of the appointment was 25 minutes.              Susana GRANT, RN CNP, FNP  [facility]   Topical Ketoconazole Pregnancy And Lactation Text: This medication is Pregnancy Category B and is considered safe during pregnancy. It is unknown if it is excreted in breast milk.

## 2025-04-30 ENCOUNTER — PATIENT OUTREACH (OUTPATIENT)
Dept: CARE COORDINATION | Facility: CLINIC | Age: 60
End: 2025-04-30
Payer: COMMERCIAL

## 2025-05-01 LAB
BUPRENORPHINE UR CFM-MCNC: 844 NG/ML
BUPRENORPHINE/CREAT UR: 574 NG/MG {CREAT}
ETHYL GLUCURONIDE UR QL SCN: NEGATIVE NG/ML
NORBUPRENORPHINE UR CFM-MCNC: 1221 NG/ML
NORBUPRENORPHINE/CREAT UR: 831 NG/MG {CREAT}

## 2025-05-05 NOTE — GROUP NOTE
Psychotherapy Group Note    PATIENT'S NAME: Jailene Breen  MRN:   [id number]  :   1965  ACCT. NUMBER: [id number]  DATE OF SERVICE: 21  START TIME: 11:00 AM  END TIME: 11:50 AM  FACILITATOR: Liliya Oh LICSW  TOPIC: MH EBP Group: Behavioral Activation  [facility]  TRACK: 5A    NUMBER OF PARTICIPANTS: 7    Summary of Group / Topics Discussed:  Behavioral Activation: Introduction and Overview of Behavioral Activation: Patients explored how behaviors affect mood and interact with thoughts and feelings (CBT/DBT).   Discussions included reviewing the benefits of making behavioral changes, and the barriers to doing so.  Specific skills introduced: taking small steps, increasing motivation, decreasing procrastination and withdrawal.  Encouraged patient exploration / reflection on the relationship between their behaviors, thoughts, and feelings.    Patient Session Goals / Objectives:    Understand the importance of behavioral patterns and how making behavioral changes benefits overall mental and physical health.    Share experiences and challenges with making behavioral changes      Identify personal barriers to change                                    Service Modality:  Video Visit     Telemedicine Visit: The patient's condition can be safely assessed and treated via synchronous audio and visual telemedicine encounter.      Reason for Telemedicine Visit: Services only offered telehealth    Originating Site (Patient Location): Patient's home    Distant Site (Provider Location): Provider Remote Setting- Home Office    Consent:  The patient/guardian has verbally consented to: the potential risks and benefits of telemedicine (video visit) versus in person care; bill my insurance or make self-payment for services provided; and responsibility for payment of non-covered services.     Patient would like the video invitation sent by:  My Chart    Mode of  Communication:  Video Conference via Medical Zoom    As the provider I attest to compliance with applicable laws and regulations related to telemedicine.           Patient Participation / Response:  Fully participated with the group by sharing personal reflections / insights and openly received / provided feedback with other participants.    Shared experiences and challenges with making behavioral changes    Treatment Plan:  Patient has a current master individualized treatment plan.  See Epic treatment plan for more information.    Liliya Oh, LICSW              Female

## 2025-05-31 NOTE — PROGRESS NOTES
[facility]      Referring provider:Minda Monzon APRN CNP     HPI: Mr. Jailene Breen is a 59 year old  male with PMH significant for    -Obesity  -Hypertension  -Rheumatoid arthritis  -Hyperlipidemia    Patient is being seen today for exercise intolerance at request of JUANITA Thomas.  Patient was seen in primary care clinic 3/10/2025 and reported exercise intolerance, chest discomfort and CEBALLOS.  Therefore underwent cardiac workup which was overall unremarkable with normal echocardiogram, 0 calcium score and unremarkable Zio patch.    Today patient reports  - Shortness of breath and excessive sweating during minimal physical activities, such as climbing a ladder or doing household chores, ongoing for at least 1.5 years.  - Occasional chest pain, described as sharp, sometimes radiating to the arm and hand, associated with shortness of breath.  - History of high blood pressure, with a significant increase during a stress test, preventing completion of the test.  - Previous attempts to investigate breathing issues with lung specialists, no definitive findings.  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  - History of rheumatoid arthritis affecting physical activity, leading to disability.  - Pre-existing high blood pressure, managed with losartan for one year.  - Pre-existing high cholesterol, managed with atorvastatin.  - Pre-existing prediabetes, hemoglobin A1c of 5.8.  Recent stress test showed hypertensive blood pressure response with BP rising to 235/62 mmHg with 9 minutes of exercise.  He chewed tobacco for few years.  He is not chewing any tobacco anymore.  Does not drink alcohol.  No drug abuse history.  Cardiac medications: Atorvastatin 40 mg and losartan 25.  LDL is 142.    Cardiac workup:  Exercise stress echocardiogram 2/12/2025 submaximal 77%, normal biventricular function.  No stress-induced ischemia  at this workload.  CT calcium 2/10/2025: 0  Zio patch 1/22/2025: Unremarkable    Medications, personal, family, and social history reviewed with patient and revised.    PAST MEDICAL HISTORY:  Past Medical History:   Diagnosis Date    Ankylosing spondylitis (H) 03/01/2017    Anxiety     Arthritis     back, knees    Back pain 11/17/2013    possible drug seeking behavior in the past.    Gastroesophageal reflux disease     Hypertension     Overweight (BMI 25.0-29.9) 03/06/2012    Panic attacks     Syncope, unspecified syncope type 02/27/2017    Tobacco use disorder 6/19/2017       CURRENT MEDICATIONS:  Current Outpatient Medications   Medication Sig Dispense Refill    atorvastatin (LIPITOR) 40 MG tablet Take 1 tablet (40 mg) by mouth daily. 90 tablet 3    buprenorphine (SUBUTEX) 8 MG SUBL sublingual tablet Place 1 tablet (8 mg) under the tongue 3 times daily. Fill/start 04/29/25.  30 day script for chronic pain 90 tablet 0    docusate sodium (COLACE) 100 MG capsule Take 2 capsules (200 mg) by mouth 2 times daily 60 capsule 10    FLUoxetine (PROZAC) 40 MG capsule Take 2 capsules (80 mg) by mouth daily. 180 capsule 0    folic acid (FOLVITE) 1 MG tablet Take 3 tablets (3,000 mcg) by mouth daily. 270 tablet 2    losartan (COZAAR) 25 MG tablet Take 1 tablet (25 mg) by mouth daily 90 tablet 3    medical cannabis (Patient's own supply) See Admin Instructions (The purpose of this order is to document that the patient reports taking medical cannabis.  This is not a prescription, and is not used to certify that the patient has a qualifying medical condition.)     Pills PRN   Lozenges PRN      methotrexate 2.5 MG tablet Take 8 tablets (20 mg) by mouth every 7 days. 96 tablet 1    naloxone (NARCAN) 4 MG/0.1ML nasal spray Spray 1 spray (4 mg) into one nostril alternating nostrils as needed for opioid reversal. every 2-3 minutes until assistance arrives 0.2 mL 1    omeprazole (PRILOSEC) 20 MG DR capsule Take 1 capsule (20 mg) by  mouth daily. 90 capsule 1    ondansetron (ZOFRAN ODT) 4 MG ODT tab Take 1 tablet (4 mg) by mouth every 8 hours as needed for nausea 30 tablet 3    polyethylene glycol (MIRALAX) 17 GM/Dose powder Take 17 g (1 Capful) by mouth daily. 510 g 3    prazosin (MINIPRESS) 1 MG capsule Take 1 capsule (1 mg) by mouth at bedtime. 90 capsule 1    predniSONE (DELTASONE) 5 MG tablet Take 3 tabs daily for 1 week, then take 2 tabs daily for 1 week 35 tablet 1    QUEtiapine (SEROQUEL) 25 MG tablet Take 1 tablet (25 mg) by mouth at bedtime. May also take 1 tablet (25 mg) daily as needed (Anxiety). 45 tablet 0    tiZANidine (ZANAFLEX) 4 MG tablet Take 1 tablet (4 mg) by mouth 3 times daily as needed for muscle spasms. 3 month supply 225 tablet 1    tofacitinib (XELJANZ XR) 11 MG 24 hr tablet Take 1 tablet (11 mg) by mouth daily. Hold for signs of infection, then seek medical attention. 90 tablet 2    vitamin D2 (ERGOCALCIFEROL) 29027 units (1250 mcg) capsule Take 1 capsule (50,000 Units) by mouth once a week. 8 capsule 0       PAST SURGICAL HISTORY:  Past Surgical History:   Procedure Laterality Date    ARTHROSCOPY KNEE Right 09/22/2016    Procedure: ARTHROSCOPY KNEE;  Surgeon: Fredo Hughes MD;  Location: [facility] OR    COMBINED ESOPHAGOSCOPY, GASTROSCOPY, DUODENOSCOPY (EGD) WITH CO2 INSUFFLATION N/A 01/19/2021    Procedure: ESOPHAGOGASTRODUODENOSCOPY, WITH CO2 INSUFFLATION;  Surgeon: Brandon Mack MD;  Location: [facility] OR    ESOPHAGOSCOPY, GASTROSCOPY, DUODENOSCOPY (EGD), COMBINED N/A 01/19/2021    Procedure: Esophagogastroduodenoscopy, With Biopsy;  Surgeon: Brandon Mack MD;  Location: [facility]    ESOPHAGOSCOPY, GASTROSCOPY, DUODENOSCOPY (EGD), COMBINED N/A 05/27/2021    Procedure: ESOPHAGOGASTRODUODENOSCOPY (EGD);  Surgeon: Chester Lynn MD;  Location: [facility]    INJECT PARAVERTEBRAL FACET JOINT LUMBAR / SACRAL FIRST Right 11/25/2016    Procedure: INJECT PARAVERTEBRAL FACET JOINT LUMBAR / SACRAL FIRST;   "Surgeon: Doretha Magallanes MD;  Location: [facility]    ORTHOPEDIC SURGERY Right     knee    PLANTAR FASCIA RELEASE Left        ALLERGIES:     Allergies   Allergen Reactions    Mirtazapine      Other reaction(s): Coma    Hydrocodone Itching    Mirtazapine Other (See Comments)     \"Blacked out\" for one week    Ms Contin [Morphine] Itching       FAMILY HISTORY:  Family History   Problem Relation Age of Onset    Hypertension Mother     Diabetes Mother     Depression Mother     Mental Illness Mother     Coronary Stenting Father 62        possible MI    Lung Cancer Paternal Uncle     Substance Abuse Brother     Substance Abuse Brother     Autoimmune Disease No family hx of     Anesthesia Reaction No family hx of     Thrombophilia No family hx of     Bleeding Disorder No family hx of          SOCIAL HISTORY:  Social History     Tobacco Use    Smoking status: Never     Passive exposure: Never    Smokeless tobacco: Former     Types: Chew     Quit date: 12/12/2019    Tobacco comments:     still chews nicotine gum   Vaping Use    Vaping status: Never Used   Substance Use Topics    Alcohol use: No     Comment: none    Drug use: Yes     Frequency: 7.0 times per week     Types: Marijuana     Comment: medicinal (oral)       ROS:   Constitutional: No fever, chills, or sweats. Weight stable.   Cardiovascular: As per HPI.       Exam:  BP (!) 142/85 (BP Location: Right arm, Patient Position: Sitting, Cuff Size: Adult Large)   Pulse 60   Wt 130.2 kg (287 lb)   SpO2 96%   BMI 34.93 kg/m    GENERAL APPEARANCE: alert and no distress  HEENT: no icterus, no central cyanosis  LYMPH/NECK: no adenopathy, no asymmetry, JVP not elevated  RESPIRATORY: lungs clear to auscultation - no rales, rhonchi or wheezes, no use of accessory muscles, no retractions, respirations are unlabored, normal respiratory rate  CARDIOVASCULAR: regular rhythm, normal S1, S2, no S3 or S4 and no murmur, click or rub, precordium quiet with normal PMI.  GI: soft, non " tender  EXTREMITIES: no edema  NEURO: alert, normal speech,and affect  SKIN: no ecchymoses, no rashes     I have reviewed the labs and personally reviewed the imaging below and made my comment in the assessment and plan.    Labs:  CBC RESULTS:   Lab Results   Component Value Date    WBC 8.1 01/22/2025    WBC 6.1 06/16/2021    RBC 4.79 01/22/2025    RBC 4.98 06/16/2021    HGB 14.3 01/22/2025    HGB 14.6 06/16/2021    HCT 42.3 01/22/2025    HCT 42.6 06/16/2021    MCV 88 01/22/2025    MCV 86 06/16/2021    MCH 29.9 01/22/2025    MCH 29.3 06/16/2021    MCHC 33.8 01/22/2025    MCHC 34.3 06/16/2021    RDW 13.4 01/22/2025    RDW 12.4 06/16/2021     01/22/2025     06/16/2021       BMP RESULTS:  Lab Results   Component Value Date     03/10/2025     06/16/2021    POTASSIUM 4.2 03/10/2025    POTASSIUM 4.4 12/08/2022    POTASSIUM 4.2 06/16/2021    CHLORIDE 104 03/10/2025    CHLORIDE 107 12/08/2022    CHLORIDE 109 06/16/2021    CO2 25 03/10/2025    CO2 29 12/08/2022    CO2 28 06/16/2021    ANIONGAP 12 03/10/2025    ANIONGAP 5 12/08/2022    ANIONGAP 4 06/16/2021     (H) 03/10/2025    GLC 96 12/08/2022    GLC 92 06/16/2021    BUN 14.1 03/10/2025    BUN 16 12/08/2022    BUN 14 06/16/2021    CR 0.92 03/10/2025    CR 1.09 06/16/2021    GFRESTIMATED >90 03/10/2025    GFRESTIMATED 77 06/16/2021    GFRESTBLACK 89 06/16/2021    SERENE 9.2 03/10/2025    SERENE 8.2 (L) 06/16/2021      Exercise stress echocardiogram 2/12/2025  Normal exercise echocardiogram without evidence of ischemia. Sensitivity is  slightly reduced due to slightly submaximal heart rate (77% MPHR.)     The target heart rate was not achieved. Exercise was terminated after 9:46 at  a HR of 126 (77% MPHR) and a workload of 150W due to fatigue. No angina was  elicited.  Blunted heart rate and hypertensive BP response to exercise.  No ECG evidence of ischemia.  Functional capacity is normal for age.     Normal biventricular size, thickness, and  global systolic function at  baseline, LVEF=60-65%.  With exercise, LVEF increased to >70% and LV cavity size decreased  appropriately.  No regional wall motion abnormalities are present at rest or with exercise.  No significant valvular abnormalities are noted on screening Doppler exam.  The aortic root and visualized ascending aorta are normal.   Maximum Predicted HR:   163 bpm             Target HR: 139 bpm        % Maximum Predicted HR: 77 %                             Stage  DurationHeart Rate  BP                                 (mm:ss)   (bpm)                         Baseline            56    140/78                           Peak    9:46     126    235/62                          Stress Duration:   9:46 mm:ss                       Maximum Stress HR: 126 bpm    CT coronary calcium 2/10/2025: No coronary calcifications.    Zio patch 1/22/2025: Predominantly rhythm sinus, no significant arrhythmia.      Assessment and Plan:     # Hypertension, not well-controlled  # Exercise intolerance over the last 1-1/2-year  # CEBALLOS over the last 1-1/2-year  # Obesity with BMI at 35  # Prediabetes  - Cardiac workup (Zio patch, CT calcium, echocardiogram and stress test) unremarkable except hypertensive blood pressure response to exercise.  His blood pressure went up to 235/62 mmHg with 9-minute exercise.  Likelihood of obstructive CAD is low given 0 calcium score.  Likely hypertensive blood pressure response to activity is the cause of his CEBALLOS, exercise intolerance and atypical chest pain    Plan:  -Referral to MTM for GLP-1 agonist given morbid obesity, hypertension and prediabetes  -Start amlodipine 5 mg  -Continue losartan 25 mg daily  -Start monitoring blood pressure at home, goal blood pressure less than 130/80 mmHg  -Nurse visit in a month to recheck blood pressure control  -Continue healthy lifestyle including low-salt diet and physical activity  -Sleep study to rule out sleep apnea    Return to clinic in few months to  recheck symptoms    Total time spent today for this visit is 60 minutes including precharting, face-to-face clinic visit, review of labs/imaging and medical documentation.    Roslyn LOZOYA MD  [facility] Division of Cardiology    Securely message with Remi

## 2025-06-02 ENCOUNTER — OFFICE VISIT (OUTPATIENT)
Dept: CARDIOLOGY | Facility: CLINIC | Age: 60
End: 2025-06-02
Payer: COMMERCIAL

## 2025-06-02 VITALS
BODY MASS INDEX: 34.93 KG/M2 | DIASTOLIC BLOOD PRESSURE: 85 MMHG | HEART RATE: 60 BPM | OXYGEN SATURATION: 96 % | SYSTOLIC BLOOD PRESSURE: 142 MMHG | WEIGHT: 287 LBS

## 2025-06-02 DIAGNOSIS — E66.811 CLASS 1 DRUG-INDUCED OBESITY WITHOUT SERIOUS COMORBIDITY WITH BODY MASS INDEX (BMI) OF 34.0 TO 34.9 IN ADULT: ICD-10-CM

## 2025-06-02 DIAGNOSIS — E66.1 CLASS 1 DRUG-INDUCED OBESITY WITHOUT SERIOUS COMORBIDITY WITH BODY MASS INDEX (BMI) OF 34.0 TO 34.9 IN ADULT: ICD-10-CM

## 2025-06-02 DIAGNOSIS — R73.03 PREDIABETES: ICD-10-CM

## 2025-06-02 DIAGNOSIS — I10 HYPERTENSION, UNCONTROLLED: ICD-10-CM

## 2025-06-02 DIAGNOSIS — I10 PRIMARY HYPERTENSION: ICD-10-CM

## 2025-06-02 DIAGNOSIS — R68.89 EXERCISE INTOLERANCE: Primary | ICD-10-CM

## 2025-06-02 DIAGNOSIS — R06.09 DYSPNEA ON EXERTION: ICD-10-CM

## 2025-06-02 DIAGNOSIS — E78.2 MIXED HYPERLIPIDEMIA: ICD-10-CM

## 2025-06-02 PROCEDURE — 99215 OFFICE O/P EST HI 40 MIN: CPT | Performed by: INTERNAL MEDICINE

## 2025-06-02 PROCEDURE — 99417 PROLNG OP E/M EACH 15 MIN: CPT | Performed by: INTERNAL MEDICINE

## 2025-06-02 PROCEDURE — 3079F DIAST BP 80-89 MM HG: CPT | Performed by: INTERNAL MEDICINE

## 2025-06-02 PROCEDURE — 3075F SYST BP GE 130 - 139MM HG: CPT | Performed by: INTERNAL MEDICINE

## 2025-06-02 RX ORDER — AMLODIPINE BESYLATE 5 MG/1
5 TABLET ORAL DAILY
Qty: 90 TABLET | Refills: 3 | Status: SHIPPED | OUTPATIENT
Start: 2025-06-02

## 2025-06-02 RX ORDER — AMLODIPINE BESYLATE 5 MG/1
5 TABLET ORAL DAILY
Qty: 90 TABLET | Refills: 3 | Status: SHIPPED | OUTPATIENT
Start: 2025-06-02 | End: 2025-06-02

## 2025-06-02 NOTE — NURSING NOTE
New Medication: Patient was educated regarding newly prescribed medication, including discussion of  the indication, administration, side effects, and when to report to MD or RN. Patient demonstrated understanding of this information and agreed to call with further questions or concerns. Patient to start amlodipine 5 mg daily.    Return Appointment: Patient given instructions regarding scheduling next clinic visit. Patient demonstrated understanding of this information and agreed to call with further questions or concerns. Patient to have nurse visit in 1 month. Patient to follow up with Dr. Smith next available. Sleep Study Referral placed. MTM Referral placed.     Patient stated he understood all health information given and agreed to call with further questions or concerns.     Ana Lu RN, BSN  Cardiology RN Care Coordinator   [place]   Phone: [phone]  Fax: [phone] ([place]) [phone] ([place])

## 2025-06-02 NOTE — NURSING NOTE
"Chief Complaint   Patient presents with    New Patient    Hyperlipidemia       Initial BP (!) 149/89 (BP Location: Left arm, Patient Position: Chair, Cuff Size: Adult Large)   Pulse 62   Wt 130.2 kg (287 lb)   SpO2 96%   BMI 34.93 kg/m   Estimated body mass index is 34.93 kg/m  as calculated from the following:    Height as of 4/18/25: 1.93 m (6' 4\").    Weight as of this encounter: 130.2 kg (287 lb)..  BP completed using cuff size: dawn Berry, Visit Facilitator    "

## 2025-06-02 NOTE — PATIENT INSTRUCTIONS
Thank you for coming to the [facility] @ [facility]; please note the following instructions:    1. Recheck Blood Pressures in Both Arms Today    2. Recommendation to  a Large Blood Pressure Cuff & Monitor. Monitor your blood pressures at home daily    3. Start Amlodipine 5 mg daily     4. Sleep Study Referral - You will be called to schedule this    5. Nurse visit in 1 month    6. MTM Referral to discuss GLP1-agonist for weight loss    7. Follow up with Dr. Smith next available        If you have any questions regarding your visit please contact your care team:     Cardiology  Telephone Number   Madeline SCOTT., RN  Ana KELLY, RN  Thais MALAGON, RN  Ambreen MOLINA, RMA  Shreya COKER, RMA  Ashlyn ALMENDAREZ, Clinic Facilitator  Gris KELLY, Clinic Facilitator [phone] (option 1)   For scheduling appts:     [phone] (select option 1)       For the Device Clinic (Pacemakers and ICD's)  RN's :  Roseanne Castellanos   During business hours: [phone]    *After business hours:  [phone] (select option 4)      Normal test result notifications will be released via Maintenance Assistant or mailed within 7 business days.  All other test results, will be communicated via telephone once reviewed by your cardiologist.    If you need a medication refill please contact your pharmacy.  Please allow 3 business days for your refill to be completed.    As always, thank you for trusting us with your health care needs!

## 2025-06-02 NOTE — LETTER
6/2/2025      RE: Jailene Breen  [address]   [place] [address]       Dear Colleague,    Thank you for the opportunity to participate in the care of your patient, Jailene Breen, at the [facility] at [facility]. Please see a copy of my visit note below.                General Cardiology [facility]      Referring provider:Minda Monzon APRN CNP     HPI: Mr. Jailene Breen is a 59 year old  male with PMH significant for    -Obesity  -Hypertension  -Rheumatoid arthritis  -Hyperlipidemia    Patient is being seen today for exercise intolerance at request of JUANITA Thomas.  Patient was seen in primary care clinic 3/10/2025 and reported exercise intolerance, chest discomfort and CEBALLOS.  Therefore underwent cardiac workup which was overall unremarkable with normal echocardiogram, 0 calcium score and unremarkable Zio patch.    Today patient reports  - Shortness of breath and excessive sweating during minimal physical activities, such as climbing a ladder or doing household chores, ongoing for at least 1.5 years.  - Occasional chest pain, described as sharp, sometimes radiating to the arm and hand, associated with shortness of breath.  - History of high blood pressure, with a significant increase during a stress test, preventing completion of the test.  - Previous attempts to investigate breathing issues with lung specialists, no definitive findings.  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  - History of rheumatoid arthritis affecting physical activity, leading to disability.  - Pre-existing high blood pressure, managed with losartan for one year.  - Pre-existing high cholesterol, managed with atorvastatin.  - Pre-existing prediabetes, hemoglobin A1c of 5.8.  Recent stress test showed hypertensive blood pressure response with BP rising to 235/62 mmHg with 9 minutes  of exercise.  He chewed tobacco for few years.  He is not chewing any tobacco anymore.  Does not drink alcohol.  No drug abuse history.  Cardiac medications: Atorvastatin 40 mg and losartan 25.  LDL is 142.    Cardiac workup:  Exercise stress echocardiogram 2/12/2025 submaximal 77%, normal biventricular function.  No stress-induced ischemia at this workload.  CT calcium 2/10/2025: 0  Zio patch 1/22/2025: Unremarkable    Medications, personal, family, and social history reviewed with patient and revised.    PAST MEDICAL HISTORY:  Past Medical History:   Diagnosis Date     Ankylosing spondylitis (H) 03/01/2017     Anxiety      Arthritis     back, knees     Back pain 11/17/2013    possible drug seeking behavior in the past.     Gastroesophageal reflux disease      Hypertension      Overweight (BMI 25.0-29.9) 03/06/2012     Panic attacks      Syncope, unspecified syncope type 02/27/2017     Tobacco use disorder 6/19/2017       CURRENT MEDICATIONS:  Current Outpatient Medications   Medication Sig Dispense Refill     atorvastatin (LIPITOR) 40 MG tablet Take 1 tablet (40 mg) by mouth daily. 90 tablet 3     buprenorphine (SUBUTEX) 8 MG SUBL sublingual tablet Place 1 tablet (8 mg) under the tongue 3 times daily. Fill/start 04/29/25.  30 day script for chronic pain 90 tablet 0     docusate sodium (COLACE) 100 MG capsule Take 2 capsules (200 mg) by mouth 2 times daily 60 capsule 10     FLUoxetine (PROZAC) 40 MG capsule Take 2 capsules (80 mg) by mouth daily. 180 capsule 0     folic acid (FOLVITE) 1 MG tablet Take 3 tablets (3,000 mcg) by mouth daily. 270 tablet 2     losartan (COZAAR) 25 MG tablet Take 1 tablet (25 mg) by mouth daily 90 tablet 3     medical cannabis (Patient's own supply) See Admin Instructions (The purpose of this order is to document that the patient reports taking medical cannabis.  This is not a prescription, and is not used to certify that the patient has a qualifying medical condition.)     Pills PRN    Lozenges PRN       methotrexate 2.5 MG tablet Take 8 tablets (20 mg) by mouth every 7 days. 96 tablet 1     naloxone (NARCAN) 4 MG/0.1ML nasal spray Spray 1 spray (4 mg) into one nostril alternating nostrils as needed for opioid reversal. every 2-3 minutes until assistance arrives 0.2 mL 1     omeprazole (PRILOSEC) 20 MG DR capsule Take 1 capsule (20 mg) by mouth daily. 90 capsule 1     ondansetron (ZOFRAN ODT) 4 MG ODT tab Take 1 tablet (4 mg) by mouth every 8 hours as needed for nausea 30 tablet 3     polyethylene glycol (MIRALAX) 17 GM/Dose powder Take 17 g (1 Capful) by mouth daily. 510 g 3     prazosin (MINIPRESS) 1 MG capsule Take 1 capsule (1 mg) by mouth at bedtime. 90 capsule 1     predniSONE (DELTASONE) 5 MG tablet Take 3 tabs daily for 1 week, then take 2 tabs daily for 1 week 35 tablet 1     QUEtiapine (SEROQUEL) 25 MG tablet Take 1 tablet (25 mg) by mouth at bedtime. May also take 1 tablet (25 mg) daily as needed (Anxiety). 45 tablet 0     tiZANidine (ZANAFLEX) 4 MG tablet Take 1 tablet (4 mg) by mouth 3 times daily as needed for muscle spasms. 3 month supply 225 tablet 1     tofacitinib (XELJANZ XR) 11 MG 24 hr tablet Take 1 tablet (11 mg) by mouth daily. Hold for signs of infection, then seek medical attention. 90 tablet 2     vitamin D2 (ERGOCALCIFEROL) 04572 units (1250 mcg) capsule Take 1 capsule (50,000 Units) by mouth once a week. 8 capsule 0       PAST SURGICAL HISTORY:  Past Surgical History:   Procedure Laterality Date     ARTHROSCOPY KNEE Right 09/22/2016    Procedure: ARTHROSCOPY KNEE;  Surgeon: Fredo Hughes MD;  Location: [facility] OR     COMBINED ESOPHAGOSCOPY, GASTROSCOPY, DUODENOSCOPY (EGD) WITH CO2 INSUFFLATION N/A 01/19/2021    Procedure: ESOPHAGOGASTRODUODENOSCOPY, WITH CO2 INSUFFLATION;  Surgeon: Brandon Mack MD;  Location: [facility] OR     ESOPHAGOSCOPY, GASTROSCOPY, DUODENOSCOPY (EGD), COMBINED N/A 01/19/2021    Procedure: Esophagogastroduodenoscopy, With Biopsy;   "Surgeon: Brandon Mack MD;  Location: [facility] OR     ESOPHAGOSCOPY, GASTROSCOPY, DUODENOSCOPY (EGD), COMBINED N/A 05/27/2021    Procedure: ESOPHAGOGASTRODUODENOSCOPY (EGD);  Surgeon: Chester Lynn MD;  Location: [facility]     INJECT PARAVERTEBRAL FACET JOINT LUMBAR / SACRAL FIRST Right 11/25/2016    Procedure: INJECT PARAVERTEBRAL FACET JOINT LUMBAR / SACRAL FIRST;  Surgeon: Doretha Magallanes MD;  Location: [facility]     ORTHOPEDIC SURGERY Right     knee     PLANTAR FASCIA RELEASE Left        ALLERGIES:     Allergies   Allergen Reactions     Mirtazapine      Other reaction(s): Coma     Hydrocodone Itching     Mirtazapine Other (See Comments)     \"Blacked out\" for one week     Ms Contin [Morphine] Itching       FAMILY HISTORY:  Family History   Problem Relation Age of Onset     Hypertension Mother      Diabetes Mother      Depression Mother      Mental Illness Mother      Coronary Stenting Father 62        possible MI     Lung Cancer Paternal Uncle      Substance Abuse Brother      Substance Abuse Brother      Autoimmune Disease No family hx of      Anesthesia Reaction No family hx of      Thrombophilia No family hx of      Bleeding Disorder No family hx of          SOCIAL HISTORY:  Social History     Tobacco Use     Smoking status: Never     Passive exposure: Never     Smokeless tobacco: Former     Types: Chew     Quit date: 12/12/2019     Tobacco comments:     still chews nicotine gum   Vaping Use     Vaping status: Never Used   Substance Use Topics     Alcohol use: No     Comment: none     Drug use: Yes     Frequency: 7.0 times per week     Types: Marijuana     Comment: medicinal (oral)       ROS:   Constitutional: No fever, chills, or sweats. Weight stable.   Cardiovascular: As per HPI.       Exam:  BP (!) 142/85 (BP Location: Right arm, Patient Position: Sitting, Cuff Size: Adult Large)   Pulse 60   Wt 130.2 kg (287 lb)   SpO2 96%   BMI 34.93 kg/m    GENERAL APPEARANCE: alert and no distress  HEENT: no " icterus, no central cyanosis  LYMPH/NECK: no adenopathy, no asymmetry, JVP not elevated  RESPIRATORY: lungs clear to auscultation - no rales, rhonchi or wheezes, no use of accessory muscles, no retractions, respirations are unlabored, normal respiratory rate  CARDIOVASCULAR: regular rhythm, normal S1, S2, no S3 or S4 and no murmur, click or rub, precordium quiet with normal PMI.  GI: soft, non tender  EXTREMITIES: no edema  NEURO: alert, normal speech,and affect  SKIN: no ecchymoses, no rashes     I have reviewed the labs and personally reviewed the imaging below and made my comment in the assessment and plan.    Labs:  CBC RESULTS:   Lab Results   Component Value Date    WBC 8.1 01/22/2025    WBC 6.1 06/16/2021    RBC 4.79 01/22/2025    RBC 4.98 06/16/2021    HGB 14.3 01/22/2025    HGB 14.6 06/16/2021    HCT 42.3 01/22/2025    HCT 42.6 06/16/2021    MCV 88 01/22/2025    MCV 86 06/16/2021    MCH 29.9 01/22/2025    MCH 29.3 06/16/2021    MCHC 33.8 01/22/2025    MCHC 34.3 06/16/2021    RDW 13.4 01/22/2025    RDW 12.4 06/16/2021     01/22/2025     06/16/2021       BMP RESULTS:  Lab Results   Component Value Date     03/10/2025     06/16/2021    POTASSIUM 4.2 03/10/2025    POTASSIUM 4.4 12/08/2022    POTASSIUM 4.2 06/16/2021    CHLORIDE 104 03/10/2025    CHLORIDE 107 12/08/2022    CHLORIDE 109 06/16/2021    CO2 25 03/10/2025    CO2 29 12/08/2022    CO2 28 06/16/2021    ANIONGAP 12 03/10/2025    ANIONGAP 5 12/08/2022    ANIONGAP 4 06/16/2021     (H) 03/10/2025    GLC 96 12/08/2022    GLC 92 06/16/2021    BUN 14.1 03/10/2025    BUN 16 12/08/2022    BUN 14 06/16/2021    CR 0.92 03/10/2025    CR 1.09 06/16/2021    GFRESTIMATED >90 03/10/2025    GFRESTIMATED 77 06/16/2021    GFRESTBLACK 89 06/16/2021    SERENE 9.2 03/10/2025    SERENE 8.2 (L) 06/16/2021      Exercise stress echocardiogram 2/12/2025  Normal exercise echocardiogram without evidence of ischemia. Sensitivity is  slightly reduced due to  slightly submaximal heart rate (77% MPHR.)     The target heart rate was not achieved. Exercise was terminated after 9:46 at  a HR of 126 (77% MPHR) and a workload of 150W due to fatigue. No angina was  elicited.  Blunted heart rate and hypertensive BP response to exercise.  No ECG evidence of ischemia.  Functional capacity is normal for age.     Normal biventricular size, thickness, and global systolic function at  baseline, LVEF=60-65%.  With exercise, LVEF increased to >70% and LV cavity size decreased  appropriately.  No regional wall motion abnormalities are present at rest or with exercise.  No significant valvular abnormalities are noted on screening Doppler exam.  The aortic root and visualized ascending aorta are normal.   Maximum Predicted HR:   163 bpm             Target HR: 139 bpm        % Maximum Predicted HR: 77 %                             Stage  DurationHeart Rate  BP                                 (mm:ss)   (bpm)                         Baseline            56    140/78                           Peak    9:46     126    235/62                          Stress Duration:   9:46 mm:ss                       Maximum Stress HR: 126 bpm    CT coronary calcium 2/10/2025: No coronary calcifications.    Zio patch 1/22/2025: Predominantly rhythm sinus, no significant arrhythmia.      Assessment and Plan:     # Hypertension, not well-controlled  # Exercise intolerance over the last 1-1/2-year  # CEBALLOS over the last 1-1/2-year  # Obesity with BMI at 35  # Prediabetes  - Cardiac workup (Zio patch, CT calcium, echocardiogram and stress test) unremarkable except hypertensive blood pressure response to exercise.  His blood pressure went up to 235/62 mmHg with 9-minute exercise.  Likelihood of obstructive CAD is low given 0 calcium score.  Likely hypertensive blood pressure response to activity is the cause of his CEBALLOS, exercise intolerance and atypical chest pain    Plan:  -Referral to MT for GLP-1 agonist given  morbid obesity, hypertension and prediabetes  -Start amlodipine 5 mg  -Continue losartan 25 mg daily  -Start monitoring blood pressure at home, goal blood pressure less than 130/80 mmHg  -Nurse visit in a month to recheck blood pressure control  -Continue healthy lifestyle including low-salt diet and physical activity  -Sleep study to rule out sleep apnea    Return to clinic in few months to recheck symptoms    Total time spent today for this visit is 60 minutes including precharting, face-to-face clinic visit, review of labs/imaging and medical documentation.    Roslyn LOZOYA MD  [facility] Division of Cardiology    Securely message with Yappe     Please do not hesitate to contact me if you have any questions/concerns.     Sincerely,     Roslyn Lozoya MD

## 2025-06-03 ENCOUNTER — MYC MEDICAL ADVICE (OUTPATIENT)
Dept: PALLIATIVE MEDICINE | Facility: CLINIC | Age: 60
End: 2025-06-03

## 2025-06-03 ENCOUNTER — PATIENT OUTREACH (OUTPATIENT)
Dept: CARE COORDINATION | Facility: CLINIC | Age: 60
End: 2025-06-03

## 2025-06-03 DIAGNOSIS — M54.59 LUMBAR FACET JOINT PAIN: ICD-10-CM

## 2025-06-03 DIAGNOSIS — F11.20 UNCOMPLICATED OPIOID DEPENDENCE (H): ICD-10-CM

## 2025-06-03 DIAGNOSIS — M51.360 DEGENERATION OF INTERVERTEBRAL DISC OF LUMBAR REGION WITH DISCOGENIC BACK PAIN: ICD-10-CM

## 2025-06-03 DIAGNOSIS — M47.817 SPONDYLOSIS WITHOUT MYELOPATHY OR RADICULOPATHY, LUMBOSACRAL REGION: ICD-10-CM

## 2025-06-03 DIAGNOSIS — F11.90 CHRONIC, CONTINUOUS USE OF OPIOIDS: ICD-10-CM

## 2025-06-03 DIAGNOSIS — M45.7 ANKYLOSING SPONDYLITIS OF LUMBOSACRAL REGION (H): ICD-10-CM

## 2025-06-03 DIAGNOSIS — M25.50 MULTIPLE JOINT PAIN: ICD-10-CM

## 2025-06-03 DIAGNOSIS — M05.79 RHEUMATOID ARTHRITIS INVOLVING MULTIPLE SITES WITH POSITIVE RHEUMATOID FACTOR (H): ICD-10-CM

## 2025-06-04 DIAGNOSIS — M06.9 RHEUMATOID ARTHRITIS INVOLVING MULTIPLE SITES, UNSPECIFIED WHETHER RHEUMATOID FACTOR PRESENT (H): ICD-10-CM

## 2025-06-04 RX ORDER — METHOTREXATE 2.5 MG/1
20 TABLET ORAL
Qty: 96 TABLET | Refills: 1 | OUTPATIENT
Start: 2025-06-04

## 2025-06-04 RX ORDER — METHOTREXATE 2.5 MG/1
20 TABLET ORAL
Qty: 96 TABLET | Refills: 1 | Status: CANCELLED | OUTPATIENT
Start: 2025-06-04

## 2025-06-04 NOTE — TELEPHONE ENCOUNTER
methotrexate 2.5 MG tablet 96 tablet 1 4/14/2025 -- No   Sig - Route: Take 8 tablets (20 mg) by mouth every 7 days. - Oral   [facility] [address]  [phone]   Declined refill/ left message .    Per review:   [facility] (supporting Juan Novak MD)        5/31/25  8:05 PM  [facility] so I'm gonna try [facility].

## 2025-06-04 NOTE — TELEPHONE ENCOUNTER
Received request for a refill(s) of     buprenorphine (SUBUTEX) 8 MG SUBL sublingual tablet        Last dispensed from pharmacy on 4/29/2025    Patient's last office/virtual visit by prescribing provider on 4/29/2025  Next office/virtual appointment scheduled for 7/29/2025    Last urine drug screen date 4/29/2025  Current opioid agreement on file (completed within the last year) YesDate of opioid agreement: 4/30/2025    E-prescribe to [id number] - [place], [place] - [address] [facility] [address] [facility]  pharmacy    Will route to [facility] for review and preparation of prescription(s).

## 2025-06-04 NOTE — TELEPHONE ENCOUNTER
Please process a refill of buprenorphine (SUBUTEX) 8 MG SUBL sublingual tablet  to     [id number] - [place], [place] - [address] [facility]  [address]   [place] [address]  Phone: [phone] Fax: [phone]

## 2025-06-05 ENCOUNTER — PATIENT OUTREACH (OUTPATIENT)
Dept: CARE COORDINATION | Facility: CLINIC | Age: 60
End: 2025-06-05
Payer: COMMERCIAL

## 2025-06-05 RX ORDER — BUPRENORPHINE 8 MG/1
8 TABLET SUBLINGUAL 3 TIMES DAILY
Qty: 90 TABLET | Refills: 0 | Status: SHIPPED | OUTPATIENT
Start: 2025-06-05

## 2025-06-05 NOTE — TELEPHONE ENCOUNTER
Medication refill information reviewed.     Due date for buprenorphine (SUBUTEX) 8 MG SUBL sublingual tablet  is 06/05/25     Prescriptions prepped for review.     Will route to provider.

## 2025-06-06 NOTE — TELEPHONE ENCOUNTER
Signed Prescriptions:                        Disp   Refills    buprenorphine (SUBUTEX) 8 MG SUBL sublingu*90 tab*0        Sig: Place 1 tablet (8 mg) under the tongue 3 times daily.           Fill/start 06/05/25.  30 day script for chronic           pain  Authorizing Provider: SUSANA PETTY        Reviewed [facility] June 5, 2025- no concerning fills.    Susana GRANT, RN CNP, FNP  [facility]  [facility]

## 2025-06-20 ENCOUNTER — MYC MEDICAL ADVICE (OUTPATIENT)
Dept: CARDIOLOGY | Facility: CLINIC | Age: 60
End: 2025-06-20
Payer: COMMERCIAL

## 2025-06-20 ENCOUNTER — TELEPHONE (OUTPATIENT)
Dept: CARDIOLOGY | Facility: CLINIC | Age: 60
End: 2025-06-20
Payer: COMMERCIAL

## 2025-06-20 NOTE — TELEPHONE ENCOUNTER
PA Initiation    Medication: WEGOVY 0.25 MG/0.5ML SC SOAJ  Insurance Company: [employer] Part D - Phone [phone] Fax [phone]  Pharmacy Filling the Rx:    Filling Pharmacy Phone:    Filling Pharmacy Fax:    Start Date: 6/20/2025

## 2025-06-20 NOTE — TELEPHONE ENCOUNTER
Hello team,    Forwarding encounter as a request to initiate prior authorization for Wegovy 0.25 mg subcutaneous once a week and subsequent dose titrations. Please let me know if you need any additional details or information.    Thank you for your help,  Anna

## 2025-06-23 NOTE — TELEPHONE ENCOUNTER
PRIOR AUTHORIZATION DENIED    Medication: WEGOVY 0.25 MG/0.5ML SC SOAJ  Insurance Company: [employer] Part D - Phone [phone] Fax [phone]  Denial Date: 6/23/2025  Denial Reason(s):   Appeal Information:   Patient Notified: yes text

## 2025-06-24 NOTE — TELEPHONE ENCOUNTER
Patient saw [employer] message. No response at this time.    Ana Lu, RN, BSN  Cardiology RN Care Coordinator   [place]   Phone: [phone]  Fax: [phone] ([place]) [phone] ([place])

## 2025-06-26 ENCOUNTER — MYC MEDICAL ADVICE (OUTPATIENT)
Dept: FAMILY MEDICINE | Facility: CLINIC | Age: 60
End: 2025-06-26
Payer: COMMERCIAL

## 2025-06-26 DIAGNOSIS — K59.03 DRUG-INDUCED CONSTIPATION: Primary | ICD-10-CM

## 2025-06-30 ENCOUNTER — PATIENT OUTREACH (OUTPATIENT)
Dept: CARE COORDINATION | Facility: CLINIC | Age: 60
End: 2025-06-30
Payer: COMMERCIAL

## 2025-06-30 RX ORDER — POLYETHYLENE GLYCOL 3350 17 G/17G
1 POWDER, FOR SOLUTION ORAL DAILY
Qty: 510 G | Refills: 2 | Status: SHIPPED | OUTPATIENT
Start: 2025-06-30

## 2025-06-30 RX ORDER — SENNOSIDES 8.6 MG
1-2 TABLET ORAL 2 TIMES DAILY PRN
Qty: 90 TABLET | Refills: 0 | Status: SHIPPED | OUTPATIENT
Start: 2025-06-30

## 2025-07-02 ENCOUNTER — ALLIED HEALTH/NURSE VISIT (OUTPATIENT)
Dept: CARDIOLOGY | Facility: CLINIC | Age: 60
End: 2025-07-02
Payer: COMMERCIAL

## 2025-07-02 ENCOUNTER — TELEPHONE (OUTPATIENT)
Dept: CARDIOLOGY | Facility: CLINIC | Age: 60
End: 2025-07-02

## 2025-07-02 ENCOUNTER — PATIENT OUTREACH (OUTPATIENT)
Dept: CARE COORDINATION | Facility: CLINIC | Age: 60
End: 2025-07-02

## 2025-07-02 VITALS — HEART RATE: 63 BPM | SYSTOLIC BLOOD PRESSURE: 125 MMHG | DIASTOLIC BLOOD PRESSURE: 79 MMHG

## 2025-07-02 DIAGNOSIS — R68.89 EXERCISE INTOLERANCE: Primary | ICD-10-CM

## 2025-07-02 PROCEDURE — 3074F SYST BP LT 130 MM HG: CPT

## 2025-07-02 PROCEDURE — 99207 PR NO CHARGE NURSE ONLY: CPT

## 2025-07-02 PROCEDURE — 3078F DIAST BP <80 MM HG: CPT

## 2025-07-02 NOTE — NURSING NOTE
"No chief complaint on file.      Initial /79 (BP Location: Right arm, Patient Position: Chair, Cuff Size: Adult Large)   Pulse 63  Estimated body mass index is 34.93 kg/m  as calculated from the following:    Height as of 4/18/25: 1.93 m (6' 4\").    Weight as of 6/2/25: 130.2 kg (287 lb)..  BP completed using cuff size: BARBARA Rendon    "

## 2025-07-02 NOTE — TELEPHONE ENCOUNTER
Called and spoke with patient. Patient not aware of appointment on 7/18. Patient is interested in appointment being sooner. Informed patient that clinic would reach out to MTM.    Ana Lu, RN, BSN  Cardiology RN Care Coordinator   [place]   Phone: [phone]  Fax: [phone] ([place]) [phone] ([place])

## 2025-07-02 NOTE — TELEPHONE ENCOUNTER
----- Message from Anna Williamson sent at 7/2/2025 11:36 AM CDT -----  Regarding: RE: Art Perez,     Unfortunately Wegovy is not covered through patient's insurance. Medicare typically excludes this class of medications for weight loss alone. I have an appointment with patient on 7/18, so I plan to follow up with him then.       Anna Williamson, PharmD  Medication Therapy Management Pharmacist   [employer]  ----- Message -----  From: Ana Lu, RN  Sent: 7/2/2025  11:29 AM CDT  To: Anan Williamson, [facility]; Ana Lu RN  Subject: Art Castillo,    This patient was in for a nurse visit for blood pressure check. He was wondering what the status of his Wegovy prescription was at.    It looks like this was denied but that it was looking into potentially a cash/coupon option? I wanted to follow up on this for patient and see if there is anything that Cardiology can assist with. Thanks!    SANDRA Perez

## 2025-07-03 NOTE — PROGRESS NOTES
"Medication Therapy Management (MTM) Encounter    ASSESSMENT:                            Medication Adherence/Access: See below for considerations.    Weight Management   Given BMI of 34.93 kg/m , patient may benefit from weight loss and initiation of GLP-1 agonist. Wegovy previously denied by insurance. If patient is diagnosed with sleep apnea after completion of sleep study, may be able to try for Zepbound approval of CRISTINE. Will defer until sleep study is completed.         PLAN:                            Follow-up once sleep study is completed     SUBJECTIVE/OBJECTIVE:                          Jamison Breen is a 59 year old male seen for a follow-up visit.       Reason for visit: GLP1-agonist follow-up.    Allergies/ADRs: Reviewed in chart  Past Medical History: Reviewed in chart  Tobacco: He reports that he has never smoked. He has never been exposed to tobacco smoke. He quit smokeless tobacco use about 5 years ago.  His smokeless tobacco use included chew.  Alcohol:  Does not drink alcohol (per 6/2/25 cards note)     Medication Adherence/Access: GLP-1 insurance coverage     Weight Management   Not on any medications for weight loss at this point    Per chart review, it was recommended patient complete a sleep study to rule out sleep apnea. Patient said he does plan on doing sleep study. He said he has not heard from anyone about scheduling this.     Unfortunately, Wegovy was denied by patient's insurance for coverage.   Patient is unable to afford out of pocket cost of GLP-1 medications.        Wt Readings from Last 4 Encounters:   06/02/25 287 lb (130.2 kg)   03/10/25 297 lb 6.4 oz (134.9 kg)   01/22/25 295 lb (133.8 kg)   08/02/24 291 lb (132 kg)     Estimated body mass index is 34.93 kg/m  as calculated from the following:    Height as of 4/18/25: 6' 4\" (1.93 m).    Weight as of 6/2/25: 287 lb (130.2 kg).      ----------------  I spent 10 minutes with this patient today. I offer these suggestions for " consideration by Roslyn Smith MD.     A summary of these recommendations was sent via [employer].    Anna Williamson, PharmD  Medication Therapy Management Pharmacist   [facility]    Telemedicine Visit Details  The patient's medications can be safely assessed via a telemedicine encounter.  Type of service:  Telephone visit  Originating Location (pt. Location): Home    Distant Location (provider location):  On-site  Start Time: 11:30 AM  End Time: 11:40 AM     Medication Therapy Recommendations  No medication therapy recommendations to display

## 2025-07-06 ENCOUNTER — MYC MEDICAL ADVICE (OUTPATIENT)
Dept: PALLIATIVE MEDICINE | Facility: CLINIC | Age: 60
End: 2025-07-06
Payer: COMMERCIAL

## 2025-07-06 DIAGNOSIS — M05.79 RHEUMATOID ARTHRITIS INVOLVING MULTIPLE SITES WITH POSITIVE RHEUMATOID FACTOR (H): ICD-10-CM

## 2025-07-06 DIAGNOSIS — F11.20 UNCOMPLICATED OPIOID DEPENDENCE (H): ICD-10-CM

## 2025-07-06 DIAGNOSIS — M45.7 ANKYLOSING SPONDYLITIS OF LUMBOSACRAL REGION (H): ICD-10-CM

## 2025-07-06 DIAGNOSIS — F11.90 CHRONIC, CONTINUOUS USE OF OPIOIDS: ICD-10-CM

## 2025-07-06 DIAGNOSIS — M25.50 MULTIPLE JOINT PAIN: ICD-10-CM

## 2025-07-06 DIAGNOSIS — M54.59 LUMBAR FACET JOINT PAIN: ICD-10-CM

## 2025-07-06 DIAGNOSIS — M51.360 DEGENERATION OF INTERVERTEBRAL DISC OF LUMBAR REGION WITH DISCOGENIC BACK PAIN: ICD-10-CM

## 2025-07-06 DIAGNOSIS — M47.817 SPONDYLOSIS WITHOUT MYELOPATHY OR RADICULOPATHY, LUMBOSACRAL REGION: ICD-10-CM

## 2025-07-07 ENCOUNTER — VIRTUAL VISIT (OUTPATIENT)
Facility: CLINIC | Age: 60
End: 2025-07-07
Attending: INTERNAL MEDICINE
Payer: COMMERCIAL

## 2025-07-07 ENCOUNTER — MYC MEDICAL ADVICE (OUTPATIENT)
Dept: CARDIOLOGY | Facility: CLINIC | Age: 60
End: 2025-07-07
Payer: COMMERCIAL

## 2025-07-07 DIAGNOSIS — E66.811 OBESITY, CLASS I, BMI 30-34.9: Primary | ICD-10-CM

## 2025-07-07 RX ORDER — BUPRENORPHINE 8 MG/1
8 TABLET SUBLINGUAL 3 TIMES DAILY
Qty: 90 TABLET | Refills: 0 | Status: SHIPPED | OUTPATIENT
Start: 2025-07-07

## 2025-07-07 NOTE — TELEPHONE ENCOUNTER
From: Anna Williamson [facility]   Sent: 7/7/2025  11:44 AM CDT   To: Cardiology General [facility],     It looks like  Can referred patient for a sleep study evaluation (referral placed on 6/2/25). Patient said he has not heard from anyone about this yet. I am not sure if this is the correct pool, but hoping someone can reach out to him and help him get scheduled for this.     Thank you,   Anna Williamson, PharmD   Medication Therapy Management Pharmacist   [facility]     Wattpad message sent to patient.     Ana Lu, RN, BSN  Cardiology RN Care Coordinator   [place]   Phone: [phone]  Fax: [phone] ([place]) [phone] ([place])

## 2025-07-07 NOTE — PATIENT INSTRUCTIONS
"Recommendations from today's MTM visit:                                                      Follow-up once sleep study is completed     It was great speaking with you today.  I value your experience and would be very thankful for your time in providing feedback in our clinic survey. In the next few days, you may receive an email or text message from [facility] with a link to a survey related to your  clinical pharmacist.\"     To schedule another MTM appointment, please call the clinic directly or you may call the MTM scheduling line at [phone].    My Clinical Pharmacist's contact information:                                                      Please feel free to contact me with any questions or concerns you have.      Anna Williamson, PharmD  Medication Therapy Management Pharmacist   [facility]     "

## 2025-07-07 NOTE — Clinical Note
Hello,   It looks like  Can referred patient for a sleep study evaluation (referral placed on 6/2/25). Patient said he has not heard from anyone about this yet. I am not sure if this is the correct pool, but hoping someone can reach out to him and help him get scheduled for this.   Thank you, Anna Williamson, PharmD Medication Therapy Management Pharmacist  [facility]

## 2025-07-07 NOTE — TELEPHONE ENCOUNTER
Patient scheduled for MTM appointment on 7/7.    Ana Lu, RN, BSN  Cardiology RN Care Coordinator   [place]   Phone: [phone]  Fax: [phone] ([place]) [phone] ([place])

## 2025-07-07 NOTE — TELEPHONE ENCOUNTER
Please process a refill of Subutex to     [id number] - [place], [place] - [address] [facility] [facility]  [address] [place] [address]  Phone: [phone] Fax: [phone]

## 2025-07-07 NOTE — TELEPHONE ENCOUNTER
Signed Prescriptions:                        Disp   Refills    buprenorphine (SUBUTEX) 8 MG SUBL sublingu*90 tab*0        Sig: Place 1 tablet (8 mg) under the tongue 3 times daily.           Fill/start 07/07/25.  30 day script for chronic           pain  Authorizing Provider: SUSANA PETTY        Reviewed [place]  July 7, 2025- no concerning fills.    Susana GRANT, RN CNP, FNP  [facility]  [facility]

## 2025-07-07 NOTE — Clinical Note
Devante Smith,   Sending as FYI. Insurance will not cover GLP-1 at this time. Once patient completes the recommended sleep study he will reach back out to me to review if Zepbound may be covered. He has not heard from anyone about the sleep evaluation referral. I will reach out to RN pool to help get patient scheduled.   Anna Williamson, PharmD Medication Therapy Management Pharmacist  [facility]

## 2025-07-07 NOTE — TELEPHONE ENCOUNTER
Received request for a refill(s) of   buprenorphine (SUBUTEX) 8 MG SUBL sublingual tablet      Last dispensed from pharmacy on 6/6/2025    Patient's last office/virtual visit by prescribing provider on 4/29/2025  Next office/virtual appointment scheduled for 7/29/2025    Last urine drug screen date 4/29/2025  Current opioid agreement on file (completed within the last year) YesDate of opioid agreement: 4/30/2025    E-prescribe to [id number] - [place], [place] - [address] [facility] [address]  pharmacy    Will route to [facility] for review and preparation of prescription(s).

## 2025-07-07 NOTE — TELEPHONE ENCOUNTER
Medication refill information reviewed.     Due date for buprenorphine (SUBUTEX) 8 MG SUBL sublingual tablet  is 07/07/25     Prescriptions prepped for review.     Will route to provider.

## 2025-07-08 NOTE — TELEPHONE ENCOUNTER
Patient saw [employer] message. No response at this time.    Ana Lu, RN, BSN  Cardiology RN Care Coordinator   [place]   Phone: [phone]  Fax: [phone] ([place]) [phone] ([place])

## 2025-07-09 ENCOUNTER — VIRTUAL VISIT (OUTPATIENT)
Facility: CLINIC | Age: 60
End: 2025-07-09
Payer: COMMERCIAL

## 2025-07-09 DIAGNOSIS — F43.10 PTSD (POST-TRAUMATIC STRESS DISORDER): ICD-10-CM

## 2025-07-09 DIAGNOSIS — F33.1 MAJOR DEPRESSIVE DISORDER, RECURRENT EPISODE, MODERATE (H): Primary | ICD-10-CM

## 2025-07-09 DIAGNOSIS — F41.1 GENERALIZED ANXIETY DISORDER WITH PANIC ATTACKS: ICD-10-CM

## 2025-07-09 DIAGNOSIS — F41.0 GENERALIZED ANXIETY DISORDER WITH PANIC ATTACKS: ICD-10-CM

## 2025-07-09 PROCEDURE — 90832 PSYTX W PT 30 MINUTES: CPT | Mod: 95 | Performed by: SOCIAL WORKER

## 2025-07-09 ASSESSMENT — PATIENT HEALTH QUESTIONNAIRE - PHQ9
SUM OF ALL RESPONSES TO PHQ QUESTIONS 1-9: 10
10. IF YOU CHECKED OFF ANY PROBLEMS, HOW DIFFICULT HAVE THESE PROBLEMS MADE IT FOR YOU TO DO YOUR WORK, TAKE CARE OF THINGS AT HOME, OR GET ALONG WITH OTHER PEOPLE: SOMEWHAT DIFFICULT
SUM OF ALL RESPONSES TO PHQ QUESTIONS 1-9: 10

## 2025-07-09 NOTE — PROGRESS NOTES
[facility] Primary Care: : Integrated Behavioral Health    Integrated Behavioral Health   Mental Health & Addiction Services      Progress Note - Initial [facility] Visit     Patient Name: Jailene Breen    Date: 2025  Service Type: Individual   Visit Start Time: 4:27 PM   Visit End Time:  5:04 PM    Attendees: Patient   Service Modality: Video Visit:      Provider verified identity through the following two step process.  Patient provided:  Patient  and Patient address    Telemedicine Visit: The patient's condition can be safely assessed and treated via synchronous audio and visual telemedicine encounter.      Reason for Telemedicine Visit: Patient has requested telehealth visit    Originating Site (Patient Location): Patient's home    Distant Site (Provider Location): [facility]    Consent:  The patient/guardian has verbally consented to: the potential risks and benefits of telemedicine (video visit) versus in person care; bill my insurance or make self-payment for services provided; and responsibility for payment of non-covered services.     Patient would like the video invitation sent by:  My Chart    Mode of Communication:  [facility] Conference [facility]    Distant Location (Provider):  On-site    As the provider I attest to compliance with applicable laws and regulations related to telemedicine.     [facility] Visit Activities (Refresh list every visit): NEW and [facility] Only         DATA:     Interactive Complexity: No   Crisis: No     Assessments completed:     The following assessments were completed by patient for this visit:  PHQ9:       2025     2:24 PM 2025     8:03 AM 2025     8:49 PM 3/10/2025     8:55 AM 2025    12:35 PM 2025     9:49 AM 2025     1:31 PM   PHQ-9 SCORE   PHQ-9 Total Score MyChart 12 (Moderate depression) 10 (Moderate depression) 7 (Mild depression) 10 (Moderate depression) 10 (Moderate depression) 7 (Mild  depression) 10 (Moderate depression)   PHQ-9 Total Score 12  10  7  10  10  7  10        Patient-reported     GAD7:       11/3/2022     9:28 AM 1/5/2023     9:55 AM 6/12/2023     8:04 PM 6/12/2023     8:06 PM 7/12/2023     5:00 PM 11/20/2023     3:06 PM 8/30/2024     9:28 AM   JEFF-7 SCORE   Total Score 10 (moderate anxiety) 8 (mild anxiety) 8 (mild anxiety) 8 (mild anxiety)   3 (minimal anxiety)   Total Score 10 8 8 8 5 3 3     CAGE-AID:       3/31/2022    10:00 AM 6/12/2023     8:05 PM 7/12/2023     5:00 PM 11/20/2023     3:06 PM   CAGE-AID Total Score   Total Score 3 0 0 4   Total Score MyChart  0 (A total score of 2 or greater is considered clinically significant)       PROMIS 10-Global Health (all questions and answers displayed):       9/18/2023    10:31 AM 10/24/2023     9:47 AM 4/9/2024     9:30 AM 8/30/2024     9:46 AM 1/6/2025     2:26 PM 4/18/2025    12:37 PM 7/9/2025     1:33 PM   PROMIS 10   In general, would you say your health is: Fair Poor Fair Fair Fair Fair Fair   In general, would you say your quality of life is: Good Good Good Good Good Fair Good   In general, how would you rate your physical health? Fair Poor Fair Fair Poor Fair Fair   In general, how would you rate your mental health, including your mood and your ability to think? Poor Poor Poor Fair Poor Fair Fair   In general, how would you rate your satisfaction with your social activities and relationships? Poor Fair Fair Poor Poor Fair Fair   In general, please rate how well you carry out your usual social activities and roles Poor Fair Fair Poor Poor Fair Fair   To what extent are you able to carry out your everyday physical activities such as walking, climbing stairs, carrying groceries, or moving a chair? A little A little A little A little A little A little Moderately   In the past 7 days, how often have you been bothered by emotional problems such as feeling anxious, depressed, or irritable? Always Always Always Always Often Often  Often   In the past 7 days, how would you rate your fatigue on average? Severe Moderate Moderate Moderate Moderate Mild Mild   In the past 7 days, how would you rate your pain on average, where 0 means no pain, and 10 means worst imaginable pain? 7 9 9 8 8 8 8   In general, would you say your health is: 2 1 2 2 2 2 2   In general, would you say your quality of life is: 3 3 3 3 3 2 3   In general, how would you rate your physical health? 2 1 2 2 1 2 2   In general, how would you rate your mental health, including your mood and your ability to think? 1 1 1 2 1 2 2   In general, how would you rate your satisfaction with your social activities and relationships? 1 2 2 1 1 2 2   In general, please rate how well you carry out your usual social activities and roles. (This includes activities at home, at work and in your community, and responsibilities as a parent, child, spouse, employee, friend, etc.) 1 2 2 1 1 2 2   To what extent are you able to carry out your everyday physical activities such as walking, climbing stairs, carrying groceries, or moving a chair? 2 2 2 2 2 2 3   In the past 7 days, how often have you been bothered by emotional problems such as feeling anxious, depressed, or irritable? 5 5 5 5 4 4 4   In the past 7 days, how would you rate your fatigue on average? 4 3 3 3 3 2 2   In the past 7 days, how would you rate your pain on average, where 0 means no pain, and 10 means worst imaginable pain? 7 9 9 8 8 8 8   Global Mental Health Score 6 7 7 7 7  8 9    Global Physical Health Score 8 8 9 9 8  10 11    PROMIS TOTAL - SUBSCORES 14 15 16 16 15  18 20        Patient-reported     [place] Suicide Severity Rating Scale (Lifetime/Recent)      6/21/2021    11:00 AM 6/21/2021     1:04 PM 3/31/2022    10:00 AM 5/2/2022     2:34 PM 6/7/2023     6:00 PM 11/20/2023     3:13 PM 7/9/2025     4:52 PM   [place] Suicide Severity Rating (Lifetime/Recent)   Q1 Wished to be Dead (Past Month)   yes  yes       Q2 Suicidal  "Thoughts (Past Month)   yes  no       Q3 Suicidal Thought Method   no  no       Q4 Suicidal Intent without Specific Plan   no  no       Q5 Suicide Intent with Specific Plan   no  no       Q6 Suicide Behavior (Lifetime)   yes  no       If yes to Q6, within past 3 months?   no        Level of Risk per Screen   moderate risk  low risk       RETIRED: 1. Wish to be Dead (Recent) No  No         RETIRED: 2. Non-Specific Active Suicidal Thoughts (Recent) No  No         RETIRED: 3. Active Suicidal Ideation with any Methods (Not Plan) Without Intent to Act (Recent)  No         4. Active Suicidal Ideation with Some Intent to Act, Without Specific Plan (Recent)  No         RETIRED: 5. Active Suicidal Ideation with Specific Plan and Intent (Recent)  No         1. Wish to be Dead (Lifetime)     N Y Y   Wish to be Dead Description (Lifetime)      Over 3 months ago. in the past 1-2 years,  last time in hospital.  \"cant do anything\" bad thoughts- intrustive negative thoughts. feeling worthless/helpless   1. Wish to be Dead (Past 1 Month)      N N   2. Non-Specific Active Suicidal Thoughts (Lifetime)     N Y Y   Non-Specific Active Suicidal Thought Description (Lifetime)      MRE:  \"Over a year ago.\"    2. Non-Specific Active Suicidal Thoughts (Past 1 Month)      N N   3. Active Suicidal Ideation with any Methods (Not Plan) Without Intent to Act (Lifetime)      N Y   3. Active Suicidal Ideation with any Methods (Not Plan) Without Intent to Act (Past 1 Month)       N   4. Active Suicidal Ideation with Some Intent to Act, Without Specific Plan (Lifetime)      Y N   4. Active Suicidal Ideation with Some Intent to Act, Without Specific Plan (Past 1 Month)      N    5. Active Suicidal Ideation with Specific Plan and Intent (Lifetime)      Y N   5. Active Suicidal Ideation with Specific Plan and Intent (Past 1 Month)      N    Most Severe Ideation Rating (Lifetime)      5    Frequency (Lifetime)      1 1   Frequency (Past 1 Month)      1  "   Duration (Lifetime)      3    Controllability (Lifetime)      3    Deterrents (Lifetime)      1    Reasons for Ideation (Lifetime)      5    Reasons for Ideation (Past 1 Month)      0    Actual Attempt (Lifetime)      Y Y   Total Number of Actual Attempts (Lifetime)      1 1   Actual Attempt Description (Lifetime)      Client tried to hang himself and the rope broke. reported it happened 5 or 10 years ago.  Attempted to hang self.   Actual Attempt (Past 3 Months)      N N   Has subject engaged in non-suicidal self-injurious behavior? (Lifetime)     N N N   Interrupted Attempts (Lifetime)      N N   Aborted or Self-Interrupted Attempt (Lifetime)      N    Preparatory Acts or Behavior (Lifetime)      N    Most Recent Attempt Date      10/9/2009    Actual Lethality/Medical Damage Code (Most Recent Attempt)      0    Potential Lethality Code (Most Recent Attempt)      2    Most Lethal Attempt Date      10/9/2009    Actual Lethality/Medical Damage Code (Most Lethal Attempt)      0    Potential Lethality Code (Most Lethal Attempt)      2    Initial/First Attempt Date      10/9/2009    Actual Lethality/Medical Damage Code (Initial/First Attempt)      0    Potential Lethality Code (Initial/First Attempt)      2    Calculated C-SSRS Risk Score (Lifetime/Recent)      Moderate Risk Moderate Risk       Data saved with a previous flowsheet row definition        Referral:   Patient was referred to [facility] by [unfilled].    Reason for referral: determine behavioral health treatment options, assess client readiness and motivation to change, and assess client social situation.      [facility] introduced self and role. Discussed informed consent and limits to confidentiality.     Presenting Concerns/ Current Stressors:   Patient shared about different life experiences that are stressors re: to family dynamics, relationships, and at times depressive symptoms re: negative self talk.     Therapeutic Interventions:  Motivational Interviewing (MI):  "Validated patient's thoughts, feelings and experience. Expressed respect for patient's autonomy in decision making.  Asked open-ended questions to invite patient's self-reflection and self-direction around change and what is important for them in working towards their goals.  Expressed and demonstrated empathy through reflective listening.  Affirmed patient's strengths and abilities.    Response to treatment interventions:   Patient was engaged in the therapy process.      Safety Issues and Plan for Safety and Risk Management:     Patient has had a history of suicidal ideation: reported in the past 1-2 years and previous. Negative self talk and worthlessness/helpless and suicide attempts: reported history of attempt possibly 5-10 years ago.  (Can't remember)    Patient denies current fears or concerns for personal safety.   Patient denies current or recent suicidal ideation or behaviors. Denies any current SI, reported '\"doing things to feel and get better.\"   Patient denies current or recent homicidal ideation or behaviors.   Patient denies current or recent self injurious behavior or ideation.   Patient denies other safety concerns.   Recommended that patient call 911 or go to the local ED should there be a change in any of these risk factors   Patient reports there are no firearms in the house.       ASSESSMENT:   Mental Status:     Appearance:   Appropriate    Eye Contact:   Good    Psychomotor Behavior: Normal    Attitude:   Cooperative    Orientation:   All   Speech Rate / Production: Normal/ Responsive   Volume:   Normal    Mood:    Normal Sad    Affect:    Appropriate    Thought Content:  Clear    Thought Form:  Coherent    Insight:    Fair         Diagnostic Criteria:   Major Depressive Disorder  A) Recurrent episode(s) - symptoms have been present during the same 2-week period and represent a change from previous functioning 5 or more symptoms (required for diagnosis)   - Diminished interest or pleasure in " all, or almost all, activities.    - Significant weight gainincrease in appetite.    - Fatigue or loss of energy.    - Feelings of worthlessness or excessive guilt.    - Diminished ability to think or concentrate, or indecisiveness.         DSM5 Diagnoses: (Sustained by DSM5 Criteria Listed Above)     Diagnoses: 296.32 (F33.1) Major Depressive Disorder, Recurrent Episode, Moderate _     Psychosocial / Contextual Factors: Medical Complexities, Trauma History, and Grief/Loss       Collateral Reports Completed:   Routed note to PCP        PLAN: (Homework, other):     1. Patient was provided:  recommendation to schedule follow-up with [facility]     2. Provider recommended the following referrals: follow up 2 weeks.        3. Suicide Risk and Safety Concerns were assessed for Jailene Breen    Safety Plan:   Patient denied any current/recent/lifetime history of suicidal ideation and/or behaviors. Recommended that patient call 911 or go to the local ED should there be a change in any of these risk factors     Refused to do a safety plan, reported no current SI and not feeling unsafe at this time. Reported doing things to take care of his overall health.      Anne Swan, [facility], [facility]   July 9, 2025

## 2025-07-29 ENCOUNTER — VIRTUAL VISIT (OUTPATIENT)
Dept: PALLIATIVE MEDICINE | Facility: CLINIC | Age: 60
End: 2025-07-29
Payer: COMMERCIAL

## 2025-07-29 DIAGNOSIS — M54.59 LUMBAR FACET JOINT PAIN: Primary | ICD-10-CM

## 2025-07-29 DIAGNOSIS — M47.817 SPONDYLOSIS WITHOUT MYELOPATHY OR RADICULOPATHY, LUMBOSACRAL REGION: ICD-10-CM

## 2025-07-29 DIAGNOSIS — M51.360 DEGENERATION OF INTERVERTEBRAL DISC OF LUMBAR REGION WITH DISCOGENIC BACK PAIN: ICD-10-CM

## 2025-07-29 DIAGNOSIS — F11.20 UNCOMPLICATED OPIOID DEPENDENCE (H): ICD-10-CM

## 2025-07-29 DIAGNOSIS — G89.29 CHRONIC PAIN OF RIGHT KNEE: ICD-10-CM

## 2025-07-29 DIAGNOSIS — F11.90 CHRONIC, CONTINUOUS USE OF OPIOIDS: ICD-10-CM

## 2025-07-29 DIAGNOSIS — M05.79 RHEUMATOID ARTHRITIS INVOLVING MULTIPLE SITES WITH POSITIVE RHEUMATOID FACTOR (H): ICD-10-CM

## 2025-07-29 DIAGNOSIS — M45.7 ANKYLOSING SPONDYLITIS OF LUMBOSACRAL REGION (H): ICD-10-CM

## 2025-07-29 DIAGNOSIS — M25.561 CHRONIC PAIN OF RIGHT KNEE: ICD-10-CM

## 2025-07-29 DIAGNOSIS — M25.50 MULTIPLE JOINT PAIN: ICD-10-CM

## 2025-07-29 PROCEDURE — 98006 SYNCH AUDIO-VIDEO EST MOD 30: CPT | Performed by: NURSE PRACTITIONER

## 2025-07-29 RX ORDER — BUPRENORPHINE 8 MG/1
8 TABLET SUBLINGUAL 3 TIMES DAILY
Qty: 90 TABLET | Refills: 0 | Status: SHIPPED | OUTPATIENT
Start: 2025-07-29

## 2025-07-29 RX ORDER — OXYCODONE HYDROCHLORIDE 5 MG/1
5 TABLET ORAL EVERY 6 HOURS PRN
Qty: 42 TABLET | Refills: 0 | Status: SHIPPED | OUTPATIENT
Start: 2025-07-29

## 2025-07-29 ASSESSMENT — PAIN SCALES - PAIN ENJOYMENT GENERAL ACTIVITY SCALE (PEG)
INTERFERED_GENERAL_ACTIVITY: 8
AVG_PAIN_PASTWEEK: 8
INTERFERED_ENJOYMENT_LIFE: 8
PEG_TOTALSCORE: 8

## 2025-07-29 NOTE — PROGRESS NOTES
Jailene Breen is a 59 year old who is being evaluated via a billable video visit.      How would you like to obtain your AVS? MyChart  If the video visit is dropped, the invitation should be resent by: Text to cell phone: [phone]  Will anyone else be joining your video visit? No  If patient encounters technical issues they should call [phone]    During this virtual visit the patient is located in [place], patient verifies this as the location during the entirety of this visit.     Video-Visit Details  Video Start Time: 8:52 AM    Video End Time: 9:11 AM    Type of service:  Video Visit      Is Pt currently in [place]? {YES/NO:[id number]]    NOTE:  If Pt is not in [place], Appointment needs to be canceled and rescheduled.    Originating Location (pt. Location): Home    Distant Location (provider location):  On-site    Platform used for Video Visit: Cuba Krueger, [facility]     7/29/2025      8:01 AM                  [facility] [place] [facility]      7/29/2025        Chief complaint:   -this past week he began getting a flare up  -he has ongoing low back mostly axial without leg radiation and multiple joint pain,especially knees            Interval history:   Jailene Breen is a 59 year old male is known to me for   Lumbar spondylosis, pain is worse with extension and rotation indicating a facetogenic component to pain  Lumbar degenerative disc disease  SI joint pain  Ankylosing spondylitis  Myofascial pain  Chronic pain syndrome  PMHx includes: Panic attacks, back pain, arthritis, anxiety, ankylosing spondylitis  PSHx includes: Right knee surgery ×2, left foot surgery right knee arthroscopy        Recommendations/plan at the last visit on 4/29/2025 included:  Physical Therapy:  none  Check out Shmuel Murray on YouTube. , easy, gentle exercises 30-90 seconds with excellent technique  Clinical Health Psychologist: continue work with your psychiatrist and therapist  Diagnostic  Studies:  None  Medication Management:    continue Subutex 8mg  three times per day Next script fill and begin 4/29/2025  Continue medical cannabis, re-certified today, there is no longer a registration fee  continue tizanidine 2-4mg three times daily as needed for muscle spasms  Further procedures recommended: none  UDT today, last took Subutex this morning. No recent alcohol consumption  Re-signed CSA  Recommendations to PCP: see above  Referral to [facility] for right knee pain   Follow up:12 weeks in-person or virtual. Please call [phone] to make your follow-up appointment with me.         Since his last visit, Jailene JOAQUÍN Breen reports:    Interval history July 29, 2025  --this past week he began getting a flare up  -last week, his mother-in-law fell and broke her hip, had to drive back and forth  -he has ongoing low back mostly axial without leg radiation and multiple joint pain,especially knees   -has been driving back and forth to see his mother-in-law who broke her hip last week, this has increased her pain  -his son eloped and had a reception here  -cardiology would like for him to be on Wegovy but his insurance won't cover it.   -trying to eat well.   -he has lost weight by eating well down to 265 lbs from 305 lbs.         Interval history April 29, 2025  Jamison reports   -he is not feeling well since Sunday, more joint pain and a migraine  -multiple joint pain from RA   -bilateral low back pain, no radiation to the legs  -right knee has been particularly bad  -right knee pain is the worst, open to seeing [facility] for possible joint injection. Denies popping, cracking, snapping, locking. Had one episode where his knee gave out and he nearly fell down  -wife had knee replaced  -had to put a pet down (14 year old dog)  -other dogs are sad  -he has been going on walks with the dogs  -he has lost about 6 lbs    Interval history January 21, 2025  -he fell out of bed and fell on his back, knocked the wind out of him.  "This happened at the end of December 2024. Had contacted me re: oxycodone script, had not given this due to no evaluation.   -states his low back pain is the same pain he has always had, just more intense. Still worse with lumbar extension and extension/rotation when loading the facet joints. He has had benefit from lumbar RFA, last done 4/11/2024.   -he does try to move around the house. He has not done any of the moves with Shmuel Murray, this was again discussed.   -will have him repeat lumbar RFA, order placed.    -his wife will be having her other knee replaced likely toward end of February.   -multiple joint pain is also worse with markedly colder weather (today is -40 degrees F)   -went to ER on 1/3/2025 for chest pain, left without being seen as he says that they seemed to blame it on panic disorder without any workup.   -has appt with his Primary Care Provider tomorrow and will discuss the intermittent chest heaviness.   -has a cracked tooth, encouraged to call dentist to get this fixed to avoid possible future infection, patient verbalized understanding.     Interval history November 18, 2024  -had been doing pretty well  -his son just got engaged and he bought a house  -Les was checking out the home and he slipped on the stairs and  tweaked his back.   -discussed possible use of Tumeric or massage therapy for acute on chronic pain.   -discussed adding in exercise in the \"shorts\" videos by Shmuel Murray online on YouTube  -Les has ongoing pain in the following areas:  -Bilateral low back pain right > Left. Denies radiation into the legs  -Hands, wrists, shoulders, elbows, and bilateral knee pain  -Joint pain is the most bothersome from RA  -seeing new rheumatologist, likes them  -slipped on stairs at son's new home and tweaked back    Interval history August 20, 2024  -he has really struggled the last 2 months.  -his bedroom is all torn up as it is leaking water. He is trying to do some of the work " "himself.   -this is stressful for him as it activity is different than he is used to.   -his back and his hips are bothering him due to increased activity.    -he tries to avoid use of prednisone as it makes him feel more anxious and can lead to panic attacks.   -he has known RA and Ankylosing Spondylitis  -he also has axial low back pain  -he has looked up Shmuel Murray but has not done any of his short exercises.   -he states he is supposed to have a stress test in the future  -notes his weight is getting close to 300 lbs. Discussed referral to comprehensive weight management program, patient interested.            At this point, the patient's participation with our multidisciplinary team includes:  The patient has been compliant with the program.  PT - last PHYSICAL THERAPY with Asif Saldana on 12/23/2020  Health Psych - worked with  Padilla Keene, last on 11/6/2018.  Working with Jonna Farrell PhD and been seen >8 times. Last visit with Santa was on 9/2/2020         Pain scores:    Pain intensity on average is 8 on a scale of 0-10.    Range is 4-9/10.   Pain right now is 8/10.   Pain is described as \"sharp, throbbing, stabbing, burning, stiffness\"  Pain is constant in nature      Current pain relevant medications:      -nortriptyline 50mg at bedtime (helpful)  -ibuprofen 800mg TID PRN (using 3 times daily-helpful)  -Tylenol 500mg using 1000mg TID (unsure if helpful)  -Maxalt 10mg PRN migraine (helpful for migraine--he does have visual aura)  -naloxone nasal spray PRN for accidental opiate overdose  -Subutex 8 mg TID (somewhat helpful)  -medical cannabis PRN (helpful)      Other pertinent medications:   -Fluoxetine 80mg every day (somewhat helpful, managed by psychiatry)  -quetiapine 150mg at bedtime  -Xeljanz 11mg every 24 hours for RA (somewhat helpful)      Previous Medications:   OPIATES: Oxycodone (helpful), Fentanyl (100mcg/hr patch helpful), hydrocodone (itching), Tramadol (not helpful), Suboxone " (somewhat helpful)  NSAIDS: ibuprofen (not helpful), Aleve (not helpful)  MUSCLE RELAXANTS: methocarbamol (somewhat helpful), Flexeril (somewhat helpful but caused severe urinary retention requiring catheterization), tizanidine (unsure if helpful), metaxalone (unsure), SOMA (helpful), chlorzoxazone (helped some)  ANTI-MIGRAINE MEDS: Maxalt (helpful for migraine), Imitrex injection (somewhat helpful)  ANTI-DEPRESSANTS: Prozac (helpful), Cymbalta (felt weird on it), nortriptyline (unsure if helpful), Buspar (unsure), Remeron (blacked out), bupropion (not helpful for smoking cessation)  SLEEP AIDS: Ambien (helpful)  ANTI-CONVULSANTS: gabapentin (felt odd on this medication, unsure of dose), Lyrica (not helpful for 4 months, unsure of dose), Topamax (not helpful, he felt weird on it)   TOPICALS: Lidocaine patches (not helpful), Voltaren gel (not helpful)  Other meds: Tylenol (unsure if helpful)        Other treatments have included:   Jailene JOAQUÍN Breen has been seen at a pain clinic in the past.  [facility]  PT: tried, not helpful  Chiropractic: tried, not helpful  Acupuncture: none  TENs Unit: tried, helpful         Injections:     -has had injections at outside clinics  -has had epidural injections for low back pain (one was helpful)  -he has had lumbar medial branch blocks at [facility] and he was going to have lumbar radiofrequency ablation (did not do this due to cost)  -4/3/2017 right LMBBs with Dr. Ashlyn Gamble (helpful)  -4/26/2017 right LMBBs with Dr. Ashlyn Gamble (helpful)  -5/31/2017 right L3, L4, L5 RFA with Dr. Ashlyn Gamble (good relief for over 6 months)  -3/15/2018 right L5 transforaminal MAKAYLA with Dr. Ashlyn Gamble (not helpful)  -5/10/2018 trigger point injections with Dr. Ashlyn Gamble(somewhat helpful).  -7/12/2018 Right L3, L4, L5 RFA with Dr. Ashlyn Gamble (helpful).  -9/20/2018 Left piriformis injections, bilateral gluteal trigger point with Dr. Gamble (helpful).  -1/31/2019 right  "L2-3, L3-4 and L4-5 facet joint injections with Dr. Ashlyn Gamble (somewhat helpful)  4/4/2019  right L3, L4, L5/sacral ala lumbar radiofrequency ablation with Dr. Gamble (helpful over right side)  6/28/2019 Bilateral facet joint injections at l4-5, L5-S1 with Dr. Holley (only helpful for 7 days)  -2/12/2020 right F7-D2-Y7-sacral ALA RFA done with Dr. Ashlyn Gamble (helpful)  -8/26/2020 left L3-5 lumbar medial branch blocks #1 with Dr. Ashlyn Gamble (very helpful)  -11/11/2020 left L3-5 lumbar medial branch blocks #2 with Dr. Ashlyn Gamble  (helpful)   -2/1/2021 bilateral lumbar RFA L4-5 and L5-S1 with Dr. Ashlyn Gamble   (this \"helped a little bit\" and then he tweaked his back about 2 weeks after it was done and it wasn't helpful)  -09/22/2022 lumbosacral RFA L4, L5 and sacral ala with Dr. Aguilar Holley (helpful,, at least 50% relief, he has a RA flare right now, so harder to tell)  -7/20/2023 bilateral lumbar RFA at L4, L5 and Sacral ala with Dr. Aguilar Holley (helpful, has reduced pain by at least 50-60% in the lower back)  -4/11/2024 bilateral lumbar RFA at L4, L5 and S1 by Dr. Aguilar Holley (has reduced pain by at least 50% about 5 weeks post-procedure. Can take up to 8 weeks to reach maximum improvement)  -4/4/2025 bilateral lumbosacral radiofrequency ablation at L4, L5 and sacral ala with Dr. Aguilar Holley (at least 50% ongoing as of 4/28/2025)        THE 4 A's OF OPIOID MAINTENANCE ANALGESIA    Analgesia: Subutex is helpful    Activity: ADLs, doing some home projects due to water damage. Cares for his dogs    Adverse effects: none    Adherence to Rx protocol: yes          Side Effects: no side effect   Patient is using the medication as prescribed: YES     Medications:  Current Outpatient Medications   Medication Sig Dispense Refill    amLODIPine (NORVASC) 5 MG tablet Take 1 tablet (5 mg) by mouth daily. 90 tablet 3    atorvastatin (LIPITOR) 40 MG tablet Take 1 tablet (40 mg) by mouth daily. 90 " tablet 3    buprenorphine (SUBUTEX) 8 MG SUBL sublingual tablet Place 1 tablet (8 mg) under the tongue 3 times daily. Fill/start 07/07/25.  30 day script for chronic pain 90 tablet 0    docusate sodium (COLACE) 100 MG capsule Take 2 capsules (200 mg) by mouth 2 times daily 60 capsule 10    FLUoxetine (PROZAC) 40 MG capsule Take 2 capsules (80 mg) by mouth daily. 180 capsule 0    folic acid (FOLVITE) 1 MG tablet Take 3 tablets (3,000 mcg) by mouth daily. 270 tablet 2    losartan (COZAAR) 25 MG tablet Take 1 tablet (25 mg) by mouth daily 90 tablet 3    medical cannabis (Patient's own supply) See Admin Instructions (The purpose of this order is to document that the patient reports taking medical cannabis.  This is not a prescription, and is not used to certify that the patient has a qualifying medical condition.)     Pills PRN   Lozenges PRN      methotrexate 2.5 MG tablet Take 8 tablets (20 mg) by mouth every 7 days. 96 tablet 1    naloxone (NARCAN) 4 MG/0.1ML nasal spray Spray 1 spray (4 mg) into one nostril alternating nostrils as needed for opioid reversal. every 2-3 minutes until assistance arrives 0.2 mL 1    omeprazole (PRILOSEC) 20 MG DR capsule Take 1 capsule (20 mg) by mouth daily. 90 capsule 1    ondansetron (ZOFRAN ODT) 4 MG ODT tab Take 1 tablet (4 mg) by mouth every 8 hours as needed for nausea 30 tablet 3    polyethylene glycol (MIRALAX) 17 GM/Dose powder Take 17 g (1 Capful) by mouth daily. 510 g 2    polyethylene glycol (MIRALAX) 17 GM/Dose powder Take 17 g (1 Capful) by mouth daily. 510 g 3    prazosin (MINIPRESS) 1 MG capsule Take 1 capsule (1 mg) by mouth at bedtime. 90 capsule 0    predniSONE (DELTASONE) 5 MG tablet Take 3 tabs daily for 1 week, then take 2 tabs daily for 1 week 35 tablet 1    QUEtiapine (SEROQUEL) 25 MG tablet Take 1 tablet (25 mg) by mouth 2 times daily as needed (anxiety or sleep).      sennosides (SENOKOT) 8.6 MG tablet Take 1-2 tablets by mouth 2 times daily as needed for  constipation. 90 tablet 0    tiZANidine (ZANAFLEX) 4 MG tablet Take 1 tablet (4 mg) by mouth 3 times daily as needed for muscle spasms. 3 month supply 225 tablet 1    tofacitinib (XELJANZ XR) 11 MG 24 hr tablet Take 1 tablet (11 mg) by mouth daily. Hold for signs of infection, then seek medical attention. 90 tablet 2    vitamin D2 (ERGOCALCIFEROL) 32083 units (1250 mcg) capsule Take 1 capsule (50,000 Units) by mouth once a week. 8 capsule 0       Medical History: any changes in medical history since they were last seen? No    Social History:    Home situation: , has 2 grown children  Occupation/Schooling: currently unemployed, worked as a  (unemployed since 3/1/2017). 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  Uses nicotine gum  If yes, was cessation counseling offered?  None needed        Physical Exam:     Vital signs: There were no vitals taken for this visit.     Behavioral observations:  Awake, alert. Cooperative.   Pulm: respirations easy and unlabored. Able to speak in full sentences without SOB or cough noted.             [place] Prescription Monitoring Program:  Reviewed [facility] 7/29/2025- no concerning fills.  Susana GRANT RN CNP, FNP  [facility]  [facility]          DIRE Score for selecting candidates for long term opioid analgesia for chronic pain:  Diagnosis  2  Intractablility  2  Risk    Psychological health  1    Chemical health  2    Reliability  2    Social support  2  Efficacy  1  Total DIRE Score = 12. Note that  7-13 predicts poor outcome (compliance and efficacy) from opioid prescribing; 14-21 predicts good outcome (compliance and efficacy)  from opioid prescribing        Assessment:    Lumbar facet joint pain  Spondylosis of  lumbar region without myelopathy or radiculopathy  Degeneration of intervertebral disc of lumbar region with discogenic back pain  Chronic pain of the right knee  Ankylosing spondylitis of lumbosacral region  Rheumatoid arthritis involving multiple joints with positive rheumatoid factor  Multiple joint pain  Uncomplicated opiate dependence  Chronic continuous use of opioids    Encounter for long-term use of opiate analgesic  Muscle spasm  Myofascial pain  UDT 4/29/2025  Signed CSA 4/29/2025  PMHx includes: Panic attacks, back pain, arthritis, anxiety, ankylosing spondylitis  PSHx includes: Right knee surgery ×2, left foot surgery right knee arthroscopy          Plan:    Physical Therapy:  none  Check out Shmuel Murray on YouTube. , easy, gentle exercises 30-90 seconds with excellent technique  Clinical Health Psychologist: continue work with your psychiatrist and therapist  Diagnostic Studies:  None  Medication Management:    continue Subutex 8mg  three times per day Next script fill 8/4 begin 8/6/2025  Continue medical cannabis  Short term script for oxycodone 5mg tabs take 1 tab Q 6 hours as needed max 3/day. Fill/begin 7/29/2025  continue tizanidine 2-4mg three times daily as needed for muscle spasms  Further procedures recommended: none  Recommendations to PCP: see above   Follow up:12 weeks in-person. Please call [phone] to make your follow-up appointment with me.         ASSESSMENT AND PLAN:  (M54.59) Lumbar facet joint pain  (primary encounter diagnosis)  Comment:   Plan: buprenorphine (SUBUTEX) 8 MG SUBL sublingual         tablet, oxyCODONE (ROXICODONE) 5 MG tablet,         Adult Pain Clinic Follow-Up Order            (M47.307) Spondylosis without myelopathy or radiculopathy, lumbosacral region  Comment:   Plan: buprenorphine (SUBUTEX) 8 MG SUBL sublingual         tablet, oxyCODONE (ROXICODONE) 5 MG tablet,         Adult Pain Clinic Follow-Up Order            (D40.355) Degeneration of  intervertebral disc of lumbar region with discogenic back pain  Comment:   Plan: buprenorphine (SUBUTEX) 8 MG SUBL sublingual         tablet, oxyCODONE (ROXICODONE) 5 MG tablet,         Adult Pain Clinic Follow-Up Order            (M25.561,  G89.29) Chronic pain of right knee  Comment:   Plan: buprenorphine (SUBUTEX) 8 MG SUBL sublingual         tablet, oxyCODONE (ROXICODONE) 5 MG tablet,         Adult Pain Clinic Follow-Up Order            (M45.7) Ankylosing spondylitis of lumbosacral region (H)  Comment:   Plan: buprenorphine (SUBUTEX) 8 MG SUBL sublingual         tablet, oxyCODONE (ROXICODONE) 5 MG tablet,         Adult Pain Clinic Follow-Up Order            (M05.79) Rheumatoid arthritis involving multiple sites with positive rheumatoid factor (H)  Comment:   Plan: buprenorphine (SUBUTEX) 8 MG SUBL sublingual         tablet, oxyCODONE (ROXICODONE) 5 MG tablet,         Adult Pain Clinic Follow-Up Order            (M25.50) Multiple joint pain  Comment:   Plan: buprenorphine (SUBUTEX) 8 MG SUBL sublingual         tablet, oxyCODONE (ROXICODONE) 5 MG tablet,         Adult Pain Clinic Follow-Up Order            (F11.20) Uncomplicated opioid dependence (H)  Comment:   Plan: buprenorphine (SUBUTEX) 8 MG SUBL sublingual         tablet, Adult Pain Clinic Follow-Up Order            (F11.90) Chronic, continuous use of opioids  Comment:   Plan: buprenorphine (SUBUTEX) 8 MG SUBL sublingual         tablet, oxyCODONE (ROXICODONE) 5 MG tablet,         Adult Pain Clinic Follow-Up Order             BILLING TIME DOCUMENTATION:   TOTAL TIME includes:   Time spent preparing to see the patient: 4 minutes (reviewing records and tests)  Time spend face to face with the patient: 19 minutes  Time spent ordering tests, medications, procedures and referrals: 0 minutes  Time spent Referring and communicating with other healthcare professionals: 0 minutes  Documenting clinical information in Epic: 8 minutes    The total TIME spent on this  patient on the day of the appointment was 31 minutes.            Susana GRANT RN CNP, FNP  [facility]

## 2025-07-29 NOTE — PATIENT INSTRUCTIONS
Plan:    Physical Therapy:  none  Check out Shmuel Trevor on YouTube. , easy, gentle exercises 30-90 seconds with excellent technique  Clinical Health Psychologist: continue work with your psychiatrist and therapist  Diagnostic Studies:  None  Medication Management:    continue Subutex 8mg  three times per day Next script fill 8/4 begin 8/6/2025  Continue medical cannabis  Short term script for oxycodone 5mg tabs take 1 tab Q 6 hours as needed max 3/day. Fill/begin 7/29/2025  continue tizanidine 2-4mg three times daily as needed for muscle spasms  Further procedures recommended: none  Recommendations to PCP: see above   Follow up:12 weeks in-person. Please call [phone] to make your follow-up appointment with me.     ----------------------------------------------------------------  Clinic Number:  [phone]   Call with any questions about your care and for scheduling assistance.   Calls are returned Monday through Friday between 8 AM and 4:30 PM. We usually get back to you within 2 business days depending on the issue/request.    If we are prescribing your medications:  For opioid medication refills, call the clinic or send a [employer] message 7 days in advance.  Please include:  Name of requested medication  Name of the pharmacy.  For non-opioid medications, call your pharmacy directly to request a refill. Please allow 3-4 days to be processed.   Per [place] State Law:  All controlled substance prescriptions must be filled within 30 days of being written.    For those controlled substances allowing refills, pickup must occur within 30 days of last fill.      We believe regular attendance is key to your success in our program!    Any time you are unable to keep your appointment we ask that you call us at least 24 hours in advance to cancel.This will allow us to offer the appointment time to another patient.   Multiple missed appointments may lead to dismissal from the clinic.

## 2025-08-25 ENCOUNTER — VIRTUAL VISIT (OUTPATIENT)
Dept: PHARMACY | Facility: CLINIC | Age: 60
End: 2025-08-25
Attending: INTERNAL MEDICINE
Payer: COMMERCIAL

## 2025-08-25 DIAGNOSIS — G89.4 CHRONIC PAIN SYNDROME: ICD-10-CM

## 2025-08-25 DIAGNOSIS — M06.9 RHEUMATOID ARTHRITIS INVOLVING MULTIPLE SITES, UNSPECIFIED WHETHER RHEUMATOID FACTOR PRESENT (H): Primary | ICD-10-CM

## 2025-08-25 DIAGNOSIS — M45.8 ANKYLOSING SPONDYLITIS OF SACRAL REGION (H): ICD-10-CM

## (undated) DEVICE — PREP CHLORAPREP 26ML TINTED ORANGE  260815

## (undated) DEVICE — SOL WATER IRRIG 1000ML BOTTLE 07139-09

## (undated) RX ORDER — FENTANYL CITRATE 50 UG/ML
INJECTION, SOLUTION INTRAMUSCULAR; INTRAVENOUS
Status: DISPENSED
Start: 2021-01-19

## (undated) RX ORDER — LIDOCAINE HYDROCHLORIDE 20 MG/ML
SOLUTION OROPHARYNGEAL
Status: DISPENSED
Start: 2021-05-26

## (undated) RX ORDER — FENTANYL CITRATE 50 UG/ML
INJECTION, SOLUTION INTRAMUSCULAR; INTRAVENOUS
Status: DISPENSED
Start: 2021-05-27

## (undated) RX ORDER — LIDOCAINE HYDROCHLORIDE 20 MG/ML
SOLUTION OROPHARYNGEAL
Status: DISPENSED
Start: 2021-06-03

## (undated) RX ORDER — OXYCODONE HYDROCHLORIDE 10 MG/1
TABLET ORAL
Status: DISPENSED
Start: 2021-06-03

## (undated) RX ORDER — SIMETHICONE 40MG/0.6ML
SUSPENSION, DROPS(FINAL DOSAGE FORM)(ML) ORAL
Status: DISPENSED
Start: 2021-01-19